# Patient Record
Sex: FEMALE | Race: WHITE | Employment: OTHER | URBAN - METROPOLITAN AREA
[De-identification: names, ages, dates, MRNs, and addresses within clinical notes are randomized per-mention and may not be internally consistent; named-entity substitution may affect disease eponyms.]

---

## 2017-01-26 ENCOUNTER — TELEPHONE (OUTPATIENT)
Dept: FAMILY MEDICINE CLINIC | Age: 47
End: 2017-01-26

## 2018-03-28 ENCOUNTER — OFFICE VISIT (OUTPATIENT)
Dept: BARIATRICS/WEIGHT MGMT | Age: 48
End: 2018-03-28

## 2018-03-28 VITALS
HEIGHT: 64 IN | SYSTOLIC BLOOD PRESSURE: 138 MMHG | HEART RATE: 101 BPM | BODY MASS INDEX: 50.02 KG/M2 | OXYGEN SATURATION: 97 % | DIASTOLIC BLOOD PRESSURE: 99 MMHG | WEIGHT: 293 LBS

## 2018-03-28 DIAGNOSIS — E66.01 MORBID OBESITY WITH BMI OF 50.0-59.9, ADULT (HCC): Primary | ICD-10-CM

## 2018-03-28 PROCEDURE — G8484 FLU IMMUNIZE NO ADMIN: HCPCS | Performed by: SURGERY

## 2018-03-28 PROCEDURE — 99215 OFFICE O/P EST HI 40 MIN: CPT | Performed by: SURGERY

## 2018-03-28 PROCEDURE — G8417 CALC BMI ABV UP PARAM F/U: HCPCS | Performed by: SURGERY

## 2018-03-28 PROCEDURE — G8427 DOCREV CUR MEDS BY ELIG CLIN: HCPCS | Performed by: SURGERY

## 2018-03-28 PROCEDURE — 1036F TOBACCO NON-USER: CPT | Performed by: SURGERY

## 2018-03-28 ASSESSMENT — ENCOUNTER SYMPTOMS
SORE THROAT: 0
CONSTIPATION: 0
BACK PAIN: 1
VOMITING: 0
WHEEZING: 0
ABDOMINAL DISTENTION: 1
VOICE CHANGE: 0
BLOOD IN STOOL: 0
NAUSEA: 0
TROUBLE SWALLOWING: 0
ABDOMINAL PAIN: 1
ANAL BLEEDING: 0
PHOTOPHOBIA: 0
COUGH: 0
COLOR CHANGE: 0
SHORTNESS OF BREATH: 1
DIARRHEA: 0

## 2018-04-12 ENCOUNTER — HOSPITAL ENCOUNTER (OUTPATIENT)
Dept: GENERAL RADIOLOGY | Age: 48
Discharge: HOME | End: 2018-04-12
Attending: PODIATRIST | Admitting: PODIATRIST

## 2018-04-12 DIAGNOSIS — M79.672 LEFT FOOT PAIN: ICD-10-CM

## 2018-04-24 ENCOUNTER — OFFICE VISIT (OUTPATIENT)
Dept: BARIATRICS/WEIGHT MGMT | Age: 48
End: 2018-04-24

## 2018-04-24 VITALS
DIASTOLIC BLOOD PRESSURE: 63 MMHG | WEIGHT: 293 LBS | HEIGHT: 64 IN | SYSTOLIC BLOOD PRESSURE: 135 MMHG | HEART RATE: 67 BPM | BODY MASS INDEX: 50.02 KG/M2

## 2018-04-24 VITALS
HEIGHT: 64 IN | BODY MASS INDEX: 50.02 KG/M2 | WEIGHT: 293 LBS | HEART RATE: 67 BPM | SYSTOLIC BLOOD PRESSURE: 135 MMHG | DIASTOLIC BLOOD PRESSURE: 63 MMHG

## 2018-04-24 DIAGNOSIS — K76.0 FATTY LIVER DISEASE, NONALCOHOLIC: ICD-10-CM

## 2018-04-24 DIAGNOSIS — E66.01 MORBID OBESITY WITH BMI OF 50.0-59.9, ADULT (HCC): Primary | ICD-10-CM

## 2018-04-24 DIAGNOSIS — Z01.818 PRE-OP EVALUATION: ICD-10-CM

## 2018-04-24 DIAGNOSIS — M54.42 BILATERAL LOW BACK PAIN WITH LEFT-SIDED SCIATICA, UNSPECIFIED CHRONICITY: ICD-10-CM

## 2018-04-24 PROCEDURE — 1036F TOBACCO NON-USER: CPT | Performed by: NURSE PRACTITIONER

## 2018-04-24 PROCEDURE — G8417 CALC BMI ABV UP PARAM F/U: HCPCS | Performed by: NURSE PRACTITIONER

## 2018-04-24 PROCEDURE — G8427 DOCREV CUR MEDS BY ELIG CLIN: HCPCS | Performed by: NURSE PRACTITIONER

## 2018-04-24 PROCEDURE — 99214 OFFICE O/P EST MOD 30 MIN: CPT | Performed by: NURSE PRACTITIONER

## 2018-04-24 PROCEDURE — 99999 PR OFFICE/OUTPT VISIT,PROCEDURE ONLY: CPT

## 2018-04-24 ASSESSMENT — ENCOUNTER SYMPTOMS
WHEEZING: 0
NAUSEA: 0
DIARRHEA: 0
ABDOMINAL PAIN: 0
ABDOMINAL DISTENTION: 0
RHINORRHEA: 0
CHEST TIGHTNESS: 0
TROUBLE SWALLOWING: 0
EYE PAIN: 0
SHORTNESS OF BREATH: 0

## 2018-07-10 ENCOUNTER — OFFICE VISIT (OUTPATIENT)
Dept: BARIATRICS/WEIGHT MGMT | Age: 48
End: 2018-07-10

## 2018-07-10 VITALS
BODY MASS INDEX: 50.02 KG/M2 | HEIGHT: 64 IN | RESPIRATION RATE: 16 BRPM | SYSTOLIC BLOOD PRESSURE: 124 MMHG | DIASTOLIC BLOOD PRESSURE: 70 MMHG | WEIGHT: 293 LBS | HEART RATE: 75 BPM

## 2018-07-10 DIAGNOSIS — Z01.818 PRE-OP EVALUATION: ICD-10-CM

## 2018-07-10 DIAGNOSIS — E66.01 MORBID OBESITY WITH BMI OF 50.0-59.9, ADULT (HCC): Primary | ICD-10-CM

## 2018-07-10 PROCEDURE — G8417 CALC BMI ABV UP PARAM F/U: HCPCS | Performed by: NURSE PRACTITIONER

## 2018-07-10 PROCEDURE — 1036F TOBACCO NON-USER: CPT | Performed by: NURSE PRACTITIONER

## 2018-07-10 PROCEDURE — G8427 DOCREV CUR MEDS BY ELIG CLIN: HCPCS | Performed by: NURSE PRACTITIONER

## 2018-07-10 PROCEDURE — 99213 OFFICE O/P EST LOW 20 MIN: CPT | Performed by: NURSE PRACTITIONER

## 2018-07-10 ASSESSMENT — ENCOUNTER SYMPTOMS
NAUSEA: 0
DIARRHEA: 0
BACK PAIN: 1
CHEST TIGHTNESS: 0
RHINORRHEA: 0
EYE PAIN: 0
ABDOMINAL PAIN: 0
ABDOMINAL DISTENTION: 0
WHEEZING: 0
TROUBLE SWALLOWING: 0
SHORTNESS OF BREATH: 0

## 2018-07-10 NOTE — PROGRESS NOTES
Patient dines out to a sit down restaurant 1 times per week. Patient eats fast food meals 1 times per week.       Drinks mostly water and diet pepsi    24 hour recall/food frequency chart:  Breakfast: none    Snack: none  Lunch: 4 chicken nugget and diet coke  Snack: none  Dinner: banana peppers  Snack: none    Total daily calories: not regularly      Exercises : goes to gym 60-90 min 4-5 times per week and walking a lot

## 2018-07-10 NOTE — PROGRESS NOTES
at the upper limit of normal in size. LV hypertrophy with normal LV systolic, but abnormal diastolic function. Normal valvular structures and function.  History of blood transfusion 09/2015    No Reaction To Blood Transfusions Received    [place] (hard of hearing)     Right Ear    HTN (hypertension)     \"on medication since 2013\"    Hx of cardiac catheterization 10/24/2010    Angiographically normal coronary arteries w/ normal LV function and wall motion.  Hx of transesophageal echocardiography (PILO) for monitoring 12/2/2010    EF 55-60%. WNL.     Lupus Dx 2013    Nausea & vomiting 4/19/2013    Pancreatitis chronic Dx 2001    Pneumonia Dx 11-15    Shortness of breath on exertion     Staph infection Dx 11-15    Left Foot    Super obesity (HCC)     Teeth missing     Upper And Lower    Thyroid disease     UTI (urinary tract infection) In Past    No Current Symptoms    Wears glasses       Patient Active Problem List   Diagnosis    Abdominal pain    Rectal bleeding    Fever    Upper GI bleed    Fibromyalgia    Lupus (systemic lupus erythematosus) (HCC)    Nausea & vomiting    Chest pain    Chronic pancreatitis (HCC)    HTN (hypertension)    Acute pancreatitis    Right-sided chest pain    Super obesity (HCC)    Normocytic anemia    Hypokalemia    Generalized abdominal pain    Pneumonia of both lungs due to infectious organism    Foot ulcer, left ([address]    SLE (systemic lupus erythematosus related syndrome) (HCC)    Hypoxia    Cellulitis    Pancytopenia (HCC)    Pleurisy    Frequent UTI    Gastroesophageal reflux disease without esophagitis    MRSA cellulitis of left foot    Depression    Fatty liver    Fatty liver disease, nonalcoholic    Arthritis    Bilateral low back pain with left-sided sciatica    Morbid obesity with BMI of 50.0-59.9, adult [facility])     Past Surgical History:   Procedure Laterality Date    APPENDECTOMY  02/1998    Done When Tubes And Ovaries Were Removed mg by mouth nightly       tizanidine (ZANAFLEX) 4 MG tablet   Take 4 mg by mouth 2 times daily        No current facility-administered medications for this visit. Allergies   Allergen Reactions    Aspirin Palpitations     \"My Heart Rate Elevates\"    Compazine [Prochlorperazine Maleate] Rash    Reglan [Metoclopramide Hcl] Rash    Shellfish-Derived Products Swelling    Toradol [Ketorolac Tromethamine] Rash           Review of Systems   Constitutional: Negative. Negative for appetite change, fatigue and fever. HENT: Negative for congestion, dental problem, hearing loss, rhinorrhea and trouble swallowing. Eyes: Negative for pain. Respiratory: Negative for chest tightness, shortness of breath and wheezing. Cardiovascular: Negative for chest pain, palpitations and leg swelling. Gastrointestinal: Negative for abdominal distention, abdominal pain, diarrhea and nausea. GERD   Endocrine: Negative for cold intolerance and polydipsia. Genitourinary: Negative for difficulty urinating and frequency. Musculoskeletal: Positive for arthralgias, back pain and myalgias. Negative for gait problem. Skin: Negative for rash. Allergic/Immunologic: Negative for environmental allergies. Neurological: Negative for dizziness, seizures and syncope. Hematological: Does not bruise/bleed easily. Psychiatric/Behavioral: Negative for behavioral problems and suicidal ideas. OBJECTIVE:    /70 (Site: Right Arm, Position: Sitting, Cuff Size: Large Adult)   Pulse 75   Resp 16   Ht 5' 4.25\" (1.632 m)   Wt (!) 306 lb 14.4 oz (139.2 kg)   BMI 52.27 kg/m²      Physical Exam   Constitutional: She is oriented to person, place, and time. She appears well-developed and well-nourished. Obese   HENT:   Head: Normocephalic and atraumatic.    Right Ear: Hearing and ear canal normal.   Left Ear: Hearing and ear canal normal.   Nose: Nose normal.   Mouth/Throat: Uvula is midline and oropharynx is clear

## 2018-07-20 ENCOUNTER — OFFICE VISIT (OUTPATIENT)
Dept: CARDIOLOGY CLINIC | Age: 48
End: 2018-07-20

## 2018-07-20 ENCOUNTER — HOSPITAL ENCOUNTER (OUTPATIENT)
Dept: GENERAL RADIOLOGY | Age: 48
Discharge: HOME | End: 2018-07-20
Attending: PODIATRIST | Admitting: PODIATRIST

## 2018-07-20 VITALS
BODY MASS INDEX: 51.91 KG/M2 | SYSTOLIC BLOOD PRESSURE: 130 MMHG | WEIGHT: 293 LBS | HEART RATE: 80 BPM | DIASTOLIC BLOOD PRESSURE: 80 MMHG

## 2018-07-20 DIAGNOSIS — Z01.810 PREOP CARDIOVASCULAR EXAM: Primary | ICD-10-CM

## 2018-07-20 DIAGNOSIS — M65.872 OTHER SYNOVITIS AND TENOSYNOVITIS, LEFT ANKLE AND FOOT: ICD-10-CM

## 2018-07-20 DIAGNOSIS — M79.672 LEFT FOOT PAIN: ICD-10-CM

## 2018-07-20 DIAGNOSIS — S92.302A CLOSED DISPLACED FRACTURE OF METATARSAL BONE OF LEFT FOOT, UNSPECIFIED METATARSAL, INITIAL ENCOUNTER: ICD-10-CM

## 2018-07-20 PROCEDURE — 99214 OFFICE O/P EST MOD 30 MIN: CPT | Performed by: INTERNAL MEDICINE

## 2018-07-20 PROCEDURE — G8417 CALC BMI ABV UP PARAM F/U: HCPCS | Performed by: INTERNAL MEDICINE

## 2018-07-20 PROCEDURE — G8427 DOCREV CUR MEDS BY ELIG CLIN: HCPCS | Performed by: INTERNAL MEDICINE

## 2018-07-20 PROCEDURE — 1036F TOBACCO NON-USER: CPT | Performed by: INTERNAL MEDICINE

## 2018-07-20 NOTE — PROGRESS NOTES
(Patient taking differently: Take 100 mcg by mouth nightly ) 30 tablet 2    gabapentin (NEURONTIN) 800 MG tablet Take 800 mg by mouth 3 times daily      atenolol (TENORMIN) 100 MG tablet Take 100 mg by mouth 2 times daily       PARoxetine (PAXIL) 30 MG tablet Take 30 mg by mouth nightly       tizanidine (ZANAFLEX) 4 MG tablet   Take 4 mg by mouth 2 times daily        No current facility-administered medications for this visit. Allergies: Aspirin; Compazine [prochlorperazine maleate]; Reglan [metoclopramide hcl]; Shellfish-derived products; and Toradol [ketorolac tromethamine]  Past Medical History:   Diagnosis Date    Acid reflux     Anemia     Anxiety     Arthritis     Hands, Back And Ankles    Bleeding ulcer 2014    \"I Had Ulcers In My Stomach And Colon\"    Bronchitis Last Episode 2014    Chronic back pain     Chronic pain     Sees Dr. Dali Swenson At [address] Depression     Fibromyalgia Dx 2013    H/O echocardiogram 8/11/15    EF >55%. LA to be at the upper limit of normal in size. LV hypertrophy with normal LV systolic, but abnormal diastolic function. Normal valvular structures and function.  History of blood transfusion 09/2015    No Reaction To Blood Transfusions Received    [place] (hard of hearing)     Right Ear    HTN (hypertension)     \"on medication since 2013\"    Hx of cardiac catheterization 10/24/2010    Angiographically normal coronary arteries w/ normal LV function and wall motion.  Hx of transesophageal echocardiography (PILO) for monitoring 12/2/2010    EF 55-60%. WNL.     Lupus Dx 2013    Nausea & vomiting 4/19/2013    Pancreatitis chronic Dx 2001    Pneumonia Dx 11-15    Shortness of breath on exertion     Staph infection Dx 11-15    Left Foot    Super obesity (HCC)     Teeth missing     Upper And Lower    Thyroid disease     UTI (urinary tract infection) In Past    No Current Symptoms    Wears glasses      Past Surgical History:   Procedure Laterality Date complaints  · Integumentary: No rash or pruritis  · Neurological: No TIA or stroke symptoms  · Psychiatric: No anxiety or depression  · Endocrine: No malaise, fatigue or temperature intolerance  · Hematologic/Lymphatic: No bleeding problems, blood clots or swollen lymph nodes  · Allergic/Immunologic: No nasal congestion or hives  All systems negative except as marked. Objective:  /80   Pulse 80   Wt (!) 304 lb 12.8 oz (138.3 kg)   Breastfeeding? No   BMI 51.91 kg/m²   Wt Readings from Last 3 Encounters:   07/20/18 (!) 304 lb 12.8 oz (138.3 kg)   07/10/18 (!) 306 lb 14.4 oz (139.2 kg)   04/24/18 (!) 316 lb 9.6 oz (143.6 kg)     Body mass index is 51.91 kg/m². GENERAL - Alert, oriented, pleasant, in no apparent distress,normal grooming  HEENT  pupils are reactive to light and accomodation, cornea intact, conjunctive normal color, ears are normal in exam,throat exam in normal, teeth, gum and palate are normal, oral mucosa is normal without any notation of pallor or cyanosis  Neck - Supple. No jugular venous distention noted. No carotid bruits, no apical -carotid delay  Respiratory:  Normal breath sounds, No respiratory distress, No wheezing, No chest tenderness. ,no use of accessory muscles, diaphragm movement is normal  Cardiovascular: (PMI) apex non displaced,no lifts no thrills, no s3,no s4, Normal heart rate, Normal rhythm, No murmurs, No rubs, No gallops.  Carotid arteries pulse and amplitude are normal no bruit, no abdominal bruit noted ( normal abdominal aorta ausculation), femoral arteries pulse and amplitude are normal no bruit, pedal pulses are normal  Femoral pulses have normal amplitude, no bruits   Extremities - No cyanosis, clubbing, or significant edema, no varicose veins    Abdomen  No masses, tenderness, or organomegaly, no hepato-splenomegally, no bruits  Musculoskeletal  No significant edema, no kyphosis or scoliosis, no deformity in any extremity noted, muscle strength and tone are normal  Skin: no ulcer,no scar,no stasis dermatitis   Neurologic  alert oriented times 3,Cranial nerves II through XII are grossly intact. There were no gross focal neurologic abnormalities. All sensory and motor nerves examined and were normal  Psychiatric: normal mood and affect    Lab Results   Component Value Date    CKTOTAL 27 01/04/2016    TROPONINI <0.006 04/25/2013    TROPONINI <0.006 11/16/2011     BNP:    Lab Results   Component Value Date    BNP 88 11/26/2010     PT/INR:  No results found for: PTINR  Lab Results   Component Value Date    LABA1C 5.4 02/06/2016    LABA1C 5.2 10/03/2015     Lab Results   Component Value Date    TRIG 161 (H) 07/23/2015     Lab Results   Component Value Date    ALT 49 (H) 03/29/2018    AST 50 (H) 03/29/2018     TSH:  No results found for: TSH    Impression:  Karla Horn is a 50 y. o.year old who  has a past medical history of Acid reflux; Anemia; Anxiety; Arthritis; Bleeding ulcer; Bronchitis; Chronic back pain; Chronic pain; Depression; Fibromyalgia; H/O echocardiogram; History of blood transfusion; [place] (hard of hearing); HTN (hypertension); Hx of cardiac catheterization; Hx of transesophageal echocardiography (PILO) for monitoring; Lupus; Nausea & vomiting; Pancreatitis chronic; Pneumonia; Shortness of breath on exertion; Staph infection; Super obesity ([address] Teeth missing; Thyroid disease; UTI (urinary tract infection); and Wears glasses. and presents with     Plan:  1. PREOP: echo ordered, her RVSP in 2015 was 41mmHg  2. HTN: stable, continue atenolol and catapres  3. Fibromyalgia: stable, continue sevela  4. Lupus: stable  5. Depression: stable  All labs, medications and tests reviewed, continue all other medications of all above medical condition listed as is.     @SYD@

## 2018-07-21 PROBLEM — R11.2 INTRACTABLE VOMITING WITH NAUSEA: Status: ACTIVE | Noted: 2018-07-21

## 2018-07-21 PROBLEM — E86.0 DEHYDRATION: Status: ACTIVE | Noted: 2018-07-21

## 2018-08-15 ENCOUNTER — OFFICE VISIT (OUTPATIENT)
Dept: BARIATRICS/WEIGHT MGMT | Age: 48
End: 2018-08-15

## 2018-08-15 VITALS
RESPIRATION RATE: 15 BRPM | WEIGHT: 293 LBS | HEART RATE: 88 BPM | HEIGHT: 64 IN | SYSTOLIC BLOOD PRESSURE: 148 MMHG | BODY MASS INDEX: 50.02 KG/M2 | DIASTOLIC BLOOD PRESSURE: 78 MMHG

## 2018-08-15 DIAGNOSIS — K92.2 UPPER GI BLEED: Primary | ICD-10-CM

## 2018-08-15 PROCEDURE — G8427 DOCREV CUR MEDS BY ELIG CLIN: HCPCS | Performed by: SURGERY

## 2018-08-15 PROCEDURE — 1036F TOBACCO NON-USER: CPT | Performed by: SURGERY

## 2018-08-15 PROCEDURE — 1111F DSCHRG MED/CURRENT MED MERGE: CPT | Performed by: SURGERY

## 2018-08-15 PROCEDURE — 99214 OFFICE O/P EST MOD 30 MIN: CPT | Performed by: SURGERY

## 2018-08-15 PROCEDURE — G8417 CALC BMI ABV UP PARAM F/U: HCPCS | Performed by: SURGERY

## 2018-08-15 ASSESSMENT — ENCOUNTER SYMPTOMS
VOICE CHANGE: 0
WHEEZING: 0
NAUSEA: 0
SHORTNESS OF BREATH: 0
SORE THROAT: 0
PHOTOPHOBIA: 0
COLOR CHANGE: 0
ANAL BLEEDING: 0
DIARRHEA: 0
COUGH: 0
BLOOD IN STOOL: 0
CONSTIPATION: 0
VOMITING: 0
ABDOMINAL PAIN: 0
TROUBLE SWALLOWING: 0

## 2018-08-15 NOTE — PROGRESS NOTES
BARIATRIC SURGERY OFFICE NOTE    SUBJECTIVE:    Patient presenting today referred from No primary care provider on file. , for   Chief Complaint   Patient presents with    Weight Management     WM visit discuss EGD       HPI: Dania Pavon is a 50 y.o. female being seen for follow up for bariatric weight loss surgery. Andre's last month weight gain/loss was - 3.5 Lbs. Addressed the status of the following co-morbidities:   1. Vomiting  improving. 2. Melena  Done. 3. Anemia improving. Patient is here for their fourth bariatric visit, follow up diet and exercise - pre-operative weight loss, in consideration for bariatric surgery. BMI: Body mass index is 51.67 kg/m². Obesity Classification: Class III Morbid Obesity. Weight History: Wt Readings from Last 3 Encounters:   08/15/18 (!) 303 lb 6.4 oz (137.6 kg)   08/06/18 (!) 342 lb 3.2 oz (155.2 kg)   07/20/18 (!) 304 lb 12.8 oz (138.3 kg)     Total weight loss/gain -3.5Lbs over 1 month. Patient dines out to a sit down restaurant 1-2 times per month. Patient eats fast food meals 2 times per month. Drinks mostly water, diet Pepsi    24 hour recall/food frequency chart:  Breakfast: 1 cup of oatmeal, 1-  8 oz glass of almond milk, a banana   Snack: no  Lunch: [employer]ystem protein shake  Snack: no  Dinner:  salad with dressing sprayed on it, baked chicken breast  Snack: no    Total daily calories: sometimes - [id number]      Exercises not exercising at this time other that a little walking since EGD    Needs her Mediport exchanged. Thoroughly reviewed the patient's medical history, family history, social history and review of systems with the patient today in the office. Please see medical record for pertinent positives.       Past Medical History:   Diagnosis Date    Acid reflux     Anemia     Anxiety     Arthritis     Hands, Back And Ankles    Bleeding ulcer 2014    \"I Had Ulcers In My Stomach And Colon\"    Bronchitis Last TONSILLECTOMY AND ADENOIDECTOMY  1975     Current Outpatient Prescriptions   Medication Sig Dispense Refill    tiZANidine (ZANAFLEX) 4 MG tablet Take 1 tablet by mouth 2 times daily 60 tablet 0    ferrous sulfate 325 (65 Fe) MG tablet Take 1 tablet by mouth 2 times daily (with meals) 60 tablet 0    levothyroxine (SYNTHROID) 125 MCG tablet Take 1 tablet by mouth Daily 30 tablet 0    sucralfate (CARAFATE) 1 GM/10ML suspension Take 10 mLs by mouth 4 times daily (before meals and nightly) 1200 mL 0    vitamin C (ASCORBIC ACID) 500 MG tablet Take 500 mg by mouth daily      ondansetron (ZOFRAN ODT) 4 MG disintegrating tablet Take 1 tablet by mouth every 6 hours 10 tablet 0    dicyclomine (BENTYL) 10 MG capsule Take 1 capsule by mouth 4 times daily as needed (abdominal cramping) 20 capsule 0    estradiol (ESTRACE) 1 MG tablet Take 1 mg by mouth nightly      milnacipran HCl (SAVELLA) 50 MG TABS Take 50 mg by mouth 2 times daily      Cholecalciferol (VITAMIN D3) 5000 UNITS TABS Take by mouth every morning Over The Counter      OMEPRAZOLE PO Take 20 mg by mouth 2 times daily      cloNIDine (CATAPRES) 0.1 MG tablet Take 0.1 mg by mouth 2 times daily      gabapentin (NEURONTIN) 800 MG tablet Take 800 mg by mouth 3 times daily      atenolol (TENORMIN) 100 MG tablet Take 100 mg by mouth 2 times daily       PARoxetine (PAXIL) 30 MG tablet Take 30 mg by mouth nightly        No current facility-administered medications for this visit. Allergies   Allergen Reactions    Aspirin Palpitations     \"My Heart Rate Elevates\"    Compazine [Prochlorperazine Maleate] Rash    Reglan [Metoclopramide Hcl] Rash    Shellfish-Derived Products Swelling    Toradol [Ketorolac Tromethamine] Rash           Review of Systems   Constitutional: Negative for activity change, chills, diaphoresis and fever. HENT: Negative for sore throat, trouble swallowing and voice change. Eyes: Negative for photophobia and visual disturbance.

## 2018-08-17 ENCOUNTER — TELEPHONE (OUTPATIENT)
Dept: BARIATRICS/WEIGHT MGMT | Age: 48
End: 2018-08-17

## 2018-08-20 PROBLEM — E86.0 DEHYDRATION: Status: RESOLVED | Noted: 2018-07-21 | Resolved: 2018-08-20

## 2018-08-30 ENCOUNTER — PROCEDURE VISIT (OUTPATIENT)
Dept: CARDIOLOGY CLINIC | Age: 48
End: 2018-08-30

## 2018-08-30 DIAGNOSIS — Z01.810 PREOP CARDIOVASCULAR EXAM: ICD-10-CM

## 2018-08-30 LAB
LV EF: 58 %
LVEF MODALITY: NORMAL

## 2018-09-04 NOTE — ANESTHESIA PRE-OP
Abdominal Hysterectomy    LUNG REMOVAL, PARTIAL Left 2008    Benign    OTHER SURGICAL HISTORY  02/1998    \"Tubes And Ovaries Removed, Appendectomy Also Done\"    OTHER SURGICAL HISTORY  Last Done 2014    Mediport Insertion \"Total Of Four Done, Removed Last Mediport In 2014\"   [address] ENDOSCOPY  08/27/2018        Current Medications:   Not in a hospital admission. Allergies: Allergies   Allergen Reactions    Aspirin Palpitations     \"My Heart Rate Elevates\"    Compazine [Prochlorperazine Maleate] Rash    Reglan [Metoclopramide Hcl] Rash    Shellfish-Derived Products Swelling    Toradol [Ketorolac Tromethamine] Rash             Social History:  Social History     Social History    Marital status:      Spouse name: N/A    Number of children: N/A    Years of education: N/A     Occupational History    Not on file. Social History Main Topics    Smoking status: Never Smoker    Smokeless tobacco: Never Used    Alcohol use No    Drug use: No    Sexual activity: No      Comment:      Other Topics Concern    Not on file     Social History Narrative    No narrative on file        Recent Vitals: There were no vitals filed for this visit. Wt Readings from Last 3 Encounters:   09/11/18 (!) 302 lb 4 oz (137.1 kg)   08/29/18 296 lb (134.3 kg)   08/15/18 (!) 303 lb 6.4 oz (137.6 kg)      Ht Readings from Last 3 Encounters:   08/29/18 5' 4\" (1.626 m)   08/15/18 5' 4.25\" (1.632 m)   07/27/18 5' 4\" (1.626 m)     There is no height or weight on file to calculate BMI.     Wt Readings from Last 3 Encounters:   08/29/18 296 lb (134.3 kg)   08/15/18 (!) 303 lb 6.4 oz (137.6 kg)   08/06/18 (!) 342 lb 3.2 oz (155.2 kg)       Present on Admission:  **None**     Anesthesia Alert:  No      Malignant Hyperthermia:  no     History of Sleep Apnea:   Recently completed sleep study, no results per Dr. Rexene Gowers.     REVIEW OF SYSTEMS:      EYES: full  Thyromental Distance: > 3 FB  History of difficult intubation:  None  Mallampati Classification:  Class II  Teeth: recent extraction upper right. Denies chipped or loose teeth    Testing Results:    EK2018  Normal sinus rhythm   Minimal voltage criteria for LVH, may be normal variant   Borderline ECG   When compared with ECG of 30-MAR-2018 01:35,   No significant change was found     LABS:      CBC  Lab Results   Component Value Date/Time    WBC 5.2 2018 09:10 AM    HGB 12.5 2018 09:10 AM    HCT 40.3 2018 09:10 AM     2018 09:10 AM     RENAL  Lab Results   Component Value Date/Time     2018 09:10 AM    K 4.4 2018 09:10 AM     2018 09:10 AM    CO2 23 2018 09:10 AM    BUN 17 2018 09:10 AM    CREATININE 0.7 2018 09:10 AM    GLUCOSE 120 (H) 2018 09:10 AM       Summary:  Chart reviewed, pt. Interviewed and examined. Planned surgical procedure:     Port removal with replacement per Dr. Sb Redd.    1.   HTN on beta blocker. Echo 2018, EF 55-60%. Denies CP or SOB. Limited activity r/t foot neuropathy. 2.  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 Non smoker. Able to lie flat. Recent completed sleep study per Dr. Ingrid Torres. 3.  Controlled reflux, on PPI. Hiatal hernia, fatty liver dz s/p norris, . 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 s/p multiple ERCP's and CT of abdm, bile duct stone removed, . Last episode2018. 4.  Hx anemia. EGD 18 per Dr. Rosario Bryan showed erosive gastropathy with gastric bleeding. Transfusion, 2018. Leukopenia. Evaluated by Dr. Carmela Howard. 5.  Hx obesity, s/p lap band ~2000 lost 100 lbs. Removed after 6 months. Following Dr. Sb Redd for gastric sleeve surgery. 4.  Benign left renal mass. Hx urinary tract infection, .    5.  Recurrent Left foot infection.  Poor

## 2018-09-05 ENCOUNTER — HOSPITAL ENCOUNTER (OUTPATIENT)
Dept: PREADMISSION TESTING | Age: 48
Discharge: HOME | End: 2018-09-05
Attending: SURGERY | Admitting: SURGERY

## 2018-09-10 ENCOUNTER — TELEPHONE (OUTPATIENT)
Dept: CARDIOLOGY CLINIC | Age: 48
End: 2018-09-10

## 2018-09-10 NOTE — TELEPHONE ENCOUNTER
Spoke with pt and gave echo results      Summary   Normal left ventricle structure and systolic function with an ejection   fraction of 55-60%.    Grade I diastolic dysfunction.   Mildly dilated left atrium.   No significant valvular disease noted.   No evidence of pericardial effusion.   Essentially unremarkable echo.

## 2018-09-11 ENCOUNTER — HOSPITAL ENCOUNTER (OUTPATIENT)
Dept: GENERAL RADIOLOGY | Age: 48
Discharge: HOME | End: 2018-09-11
Attending: INTERNAL MEDICINE | Admitting: INTERNAL MEDICINE

## 2018-09-11 ENCOUNTER — HOSPITAL ENCOUNTER (OUTPATIENT)
Dept: GENERAL RADIOLOGY | Age: 48
Discharge: HOME | End: 2018-09-11
Attending: PAIN MEDICINE | Admitting: PAIN MEDICINE

## 2018-09-11 ENCOUNTER — HOSPITAL ENCOUNTER (OUTPATIENT)
Dept: PREADMISSION TESTING | Age: 48
Discharge: HOME | End: 2018-09-11
Attending: SURGERY | Admitting: SURGERY

## 2018-09-11 VITALS
RESPIRATION RATE: 16 BRPM | WEIGHT: 293 LBS | HEART RATE: 72 BPM | SYSTOLIC BLOOD PRESSURE: 187 MMHG | OXYGEN SATURATION: 96 % | TEMPERATURE: 97.1 F | DIASTOLIC BLOOD PRESSURE: 79 MMHG | HEIGHT: 65 IN | BODY MASS INDEX: 48.82 KG/M2

## 2018-09-11 DIAGNOSIS — M54.42 CHRONIC LOW BACK PAIN WITH LEFT-SIDED SCIATICA, UNSPECIFIED BACK PAIN LATERALITY: ICD-10-CM

## 2018-09-11 DIAGNOSIS — R06.02 BREATH SHORTNESS: ICD-10-CM

## 2018-09-11 DIAGNOSIS — G89.29 CHRONIC LOW BACK PAIN WITH LEFT-SIDED SCIATICA, UNSPECIFIED BACK PAIN LATERALITY: ICD-10-CM

## 2018-09-11 LAB
ANION GAP SERPL CALCULATED.3IONS-SCNC: 12 MMOL/L (ref 4–16)
BUN BLDV-MCNC: 17 MG/DL (ref 6–23)
CALCIUM SERPL-MCNC: 12.3 MG/DL (ref 8.3–10.6)
CHLORIDE BLD-SCNC: 104 MMOL/L (ref 99–110)
CO2: 23 MMOL/L (ref 21–32)
CREAT SERPL-MCNC: 0.7 MG/DL (ref 0.6–1.1)
GFR AFRICAN AMERICAN: >60 ML/MIN/1.73M2
GFR NON-AFRICAN AMERICAN: >60 ML/MIN/1.73M2
GLUCOSE BLD-MCNC: 120 MG/DL (ref 70–99)
HCT VFR BLD CALC: 40.3 % (ref 37–47)
HEMOGLOBIN: 12.5 GM/DL (ref 12.5–16)
MCH RBC QN AUTO: 27.2 PG (ref 27–31)
MCHC RBC AUTO-ENTMCNC: 31 % (ref 32–36)
MCV RBC AUTO: 87.8 FL (ref 78–100)
PDW BLD-RTO: 13.3 % (ref 11.7–14.9)
PLATELET # BLD: 310 K/CU MM (ref 140–440)
PMV BLD AUTO: 10 FL (ref 7.5–11.1)
POTASSIUM SERPL-SCNC: 4.4 MMOL/L (ref 3.5–5.1)
RBC # BLD: 4.59 M/CU MM (ref 4.2–5.4)
SODIUM BLD-SCNC: 139 MMOL/L (ref 135–145)
WBC # BLD: 5.2 K/CU MM (ref 4–10.5)

## 2018-09-11 RX ORDER — HYDROCODONE BITARTRATE AND ACETAMINOPHEN 5; 325 MG/1; MG/1
1 TABLET ORAL PRN
Status: ON HOLD | COMMUNITY
End: 2018-09-13 | Stop reason: HOSPADM

## 2018-09-11 ASSESSMENT — PAIN SCALES - GENERAL: PAINLEVEL_OUTOF10: 0

## 2018-09-19 ENCOUNTER — OFFICE VISIT (OUTPATIENT)
Dept: BARIATRICS/WEIGHT MGMT | Age: 48
End: 2018-09-19

## 2018-09-19 VITALS
WEIGHT: 293 LBS | HEART RATE: 75 BPM | SYSTOLIC BLOOD PRESSURE: 149 MMHG | HEIGHT: 64 IN | DIASTOLIC BLOOD PRESSURE: 67 MMHG | RESPIRATION RATE: 14 BRPM | BODY MASS INDEX: 50.02 KG/M2

## 2018-09-19 DIAGNOSIS — E66.01 MORBID OBESITY WITH BMI OF 50.0-59.9, ADULT (HCC): ICD-10-CM

## 2018-09-19 DIAGNOSIS — K44.9 HIATAL HERNIA: Primary | ICD-10-CM

## 2018-09-19 PROCEDURE — G8427 DOCREV CUR MEDS BY ELIG CLIN: HCPCS | Performed by: SURGERY

## 2018-09-19 PROCEDURE — 99214 OFFICE O/P EST MOD 30 MIN: CPT | Performed by: SURGERY

## 2018-09-19 PROCEDURE — 1036F TOBACCO NON-USER: CPT | Performed by: SURGERY

## 2018-09-19 PROCEDURE — G8417 CALC BMI ABV UP PARAM F/U: HCPCS | Performed by: SURGERY

## 2018-09-19 RX ORDER — HYDROCODONE BITARTRATE AND ACETAMINOPHEN 7.5; 325 MG/1; MG/1
1 TABLET ORAL 3 TIMES DAILY
Status: ON HOLD | COMMUNITY
End: 2019-02-13 | Stop reason: HOSPADM

## 2018-09-19 ASSESSMENT — ENCOUNTER SYMPTOMS
COLOR CHANGE: 0
VOICE CHANGE: 0
DIARRHEA: 0
SORE THROAT: 0
BACK PAIN: 1
ABDOMINAL PAIN: 1
VOMITING: 0
CONSTIPATION: 0
SHORTNESS OF BREATH: 1
ABDOMINAL DISTENTION: 1
NAUSEA: 0
COUGH: 0
WHEEZING: 0
ANAL BLEEDING: 0
TROUBLE SWALLOWING: 0
PHOTOPHOBIA: 0
BLOOD IN STOOL: 0

## 2018-09-19 NOTE — PROGRESS NOTES
500 MG tablet Take 500 mg by mouth every morning Over The Counter      dicyclomine (BENTYL) 10 MG capsule Take 1 capsule by mouth 4 times daily as needed (abdominal cramping) 20 capsule 0    estradiol (ESTRACE) 1 MG tablet Take 1 mg by mouth nightly      milnacipran HCl (SAVELLA) 50 MG TABS Take 50 mg by mouth 2 times daily      Cholecalciferol (VITAMIN D3) 5000 UNITS TABS Take by mouth every morning Over The Counter      OMEPRAZOLE PO Take 20 mg by mouth 2 times daily      cloNIDine (CATAPRES) 0.1 MG tablet Take 0.1 mg by mouth 2 times daily      gabapentin (NEURONTIN) 800 MG tablet Take 800 mg by mouth 3 times daily      atenolol (TENORMIN) 100 MG tablet Take 100 mg by mouth nightly       PARoxetine (PAXIL) 30 MG tablet Take 30 mg by mouth nightly        No current facility-administered medications for this visit. Allergies   Allergen Reactions    Aspirin Palpitations     \"My Heart Rate Elevates\"    Compazine [Prochlorperazine Maleate] Rash    Reglan [Metoclopramide Hcl] Rash    Shellfish-Derived Products Swelling    Toradol [Ketorolac Tromethamine] Rash           Review of Systems   Constitutional: Positive for fatigue. Negative for activity change, chills, diaphoresis and fever. HENT: Negative for sore throat, trouble swallowing and voice change. Eyes: Negative for photophobia and visual disturbance. Respiratory: Positive for shortness of breath. Negative for cough and wheezing. Cardiovascular: Positive for leg swelling. Negative for chest pain and palpitations. Gastrointestinal: Positive for abdominal distention and abdominal pain. Negative for anal bleeding, blood in stool, constipation, diarrhea, nausea and vomiting. Endocrine: Positive for polyphagia. Negative for cold intolerance, heat intolerance, polydipsia and polyuria. Genitourinary: Positive for urgency. Negative for dysuria, frequency and hematuria.    Musculoskeletal: Positive for arthralgias, back pain and gait problem. Negative for joint swelling, myalgias and neck stiffness. Skin: Negative for color change and rash. Neurological: Negative for seizures, speech difficulty, light-headedness and numbness. Hematological: Negative for adenopathy. Does not bruise/bleed easily. Psychiatric/Behavioral: Positive for sleep disturbance. The patient is nervous/anxious. OBJECTIVE:    Vitals:    09/19/18 1429   BP: (!) 149/67   Pulse: 75   Resp: 14        Physical Exam   Constitutional: She is oriented to person, place, and time. She appears well-developed and well-nourished. No distress. HENT:   Head: Normocephalic and atraumatic. Eyes: Pupils are equal, round, and reactive to light. Conjunctivae and EOM are normal. No scleral icterus. Neck: Normal range of motion. No JVD present. No tracheal deviation present. No thyromegaly present. Cardiovascular: Normal rate, regular rhythm, normal heart sounds and intact distal pulses. Exam reveals no gallop and no friction rub. No murmur heard. Pulmonary/Chest: Effort normal. No stridor. No respiratory distress. She has no wheezes. She has no rales. She exhibits no tenderness. Abdominal: Soft. Bowel sounds are normal. She exhibits no distension and no mass. There is no tenderness. There is no rebound and no guarding. Musculoskeletal: Normal range of motion. She exhibits no edema or tenderness. Lymphadenopathy:     She has no cervical adenopathy. Neurological: She is alert and oriented to person, place, and time. No cranial nerve deficit. Coordination normal.   Skin: Skin is warm and dry. No rash noted. She is not diaphoretic. No erythema. No pallor. Psychiatric: Her behavior is normal. Judgment and thought content normal.   Vitals reviewed. 8/29/2018 10:10 AM - Parag, Lab Incoming [place]     Narrative     (NOTE)  Specimen #[id number]  Final Pathologic Diagnosis:  Stomach, antrum, biopsy:  -  Mild chronic gastritis (see comment, see stains report).   -  Helicobacter pylori microorganisms are not identified. ASSESSMENT & PLAN:    1. Hiatal hernia    2. Morbid obesity with BMI of 50.0-59.9, adult ([address]         Needs one last month, then will consent for the sleeve. Patient was encouraged to journal all food intake. Keep calorie level at approximately 2007-7300. Protein intake is to be a minimum of 60-80 grams per day. Water drinking was encouraged with a goal of 64oz-128oz daily. Beverages to be calorie free except for milk. Every other beverage should be water. They are to avoid soda. Continue to increase level of physical activity. More than 50% of the office visit today (25 min) was spent in face to face counseling regarding diet and exercise, in preparation for her planned Robotic Sleeve Gastrectomy. Counting calories, complying with the dietitian's recommendations, and complying with the preoperative workup including the dietitian counseling, exercise physiologist counseling, cardiologist evaluation and pre-operative optimization, pulmonologist evaluation and pre operative optimization, pre operative EGD evaluation. The patient expressed understanding and willingness to comply nicely; all questions and concerns addressed in details. Patient counseled on the risks, benefits, and alternatives of treatment plan at length while in the office today. Patient states an understanding and willingness to proceed with the plan. Follow Up:  Return in about 4 weeks (around 10/17/2018) for Bariatric follow up: diet, exercise & weight loss, Follow up Symptoms.       Joey Mcclure MD, FACS, FICS  Member of the [id number] of [employer] and [employer]    [phone]    9/19/18

## 2018-10-16 ENCOUNTER — HOSPITAL ENCOUNTER (OUTPATIENT)
Age: 48
Setting detail: SPECIMEN
Discharge: HOME | End: 2018-10-16
Payer: COMMERCIAL

## 2018-10-16 LAB
FERRITIN: 52 NG/ML (ref 15–150)
IRON: 39 UG/DL (ref 37–145)
PCT TRANSFERRIN: 10 % (ref 10–44)
TOTAL IRON BINDING CAPACITY: 376 UG/DL (ref 250–450)
UNSATURATED IRON BINDING CAPACITY: 337 UG/DL (ref 110–370)

## 2018-10-16 PROCEDURE — 83540 ASSAY OF IRON: CPT

## 2018-10-16 PROCEDURE — 83550 IRON BINDING TEST: CPT

## 2018-10-16 PROCEDURE — 82728 ASSAY OF FERRITIN: CPT

## 2018-10-24 ENCOUNTER — HOSPITAL ENCOUNTER (EMERGENCY)
Age: 48
Discharge: HOME | End: 2018-10-24
Payer: COMMERCIAL

## 2018-10-24 VITALS
HEART RATE: 67 BPM | SYSTOLIC BLOOD PRESSURE: 103 MMHG | HEIGHT: 64 IN | WEIGHT: 293 LBS | DIASTOLIC BLOOD PRESSURE: 64 MMHG | BODY MASS INDEX: 50.02 KG/M2 | OXYGEN SATURATION: 97 % | RESPIRATION RATE: 10 BRPM | TEMPERATURE: 97.9 F

## 2018-10-24 DIAGNOSIS — R10.9 ABDOMINAL PAIN, UNSPECIFIED ABDOMINAL LOCATION: ICD-10-CM

## 2018-10-24 DIAGNOSIS — R11.2 NON-INTRACTABLE VOMITING WITH NAUSEA, UNSPECIFIED VOMITING TYPE: Primary | ICD-10-CM

## 2018-10-24 LAB
ALBUMIN SERPL-MCNC: 3.8 GM/DL (ref 3.4–5)
ALP BLD-CCNC: 129 IU/L (ref 40–129)
ALT SERPL-CCNC: 27 U/L (ref 10–40)
ANION GAP SERPL CALCULATED.3IONS-SCNC: 12 MMOL/L (ref 4–16)
APTT: 28.5 SECONDS (ref 21.2–33)
AST SERPL-CCNC: 25 IU/L (ref 15–37)
BACTERIA: NEGATIVE /HPF
BASOPHILS ABSOLUTE: 0 K/CU MM
BASOPHILS RELATIVE PERCENT: 0.6 % (ref 0–1)
BILIRUB SERPL-MCNC: 0.1 MG/DL (ref 0–1)
BILIRUBIN URINE: NEGATIVE MG/DL
BLOOD, URINE: NEGATIVE
BUN BLDV-MCNC: 16 MG/DL (ref 6–23)
CALCIUM SERPL-MCNC: 10.4 MG/DL (ref 8.3–10.6)
CHLORIDE BLD-SCNC: 104 MMOL/L (ref 99–110)
CLARITY: ABNORMAL
CO2: 21 MMOL/L (ref 21–32)
COLOR: YELLOW
CREAT SERPL-MCNC: 0.6 MG/DL (ref 0.6–1.1)
DIFFERENTIAL TYPE: ABNORMAL
EOSINOPHILS ABSOLUTE: 0.4 K/CU MM
EOSINOPHILS RELATIVE PERCENT: 9.2 % (ref 0–3)
GFR AFRICAN AMERICAN: >60 ML/MIN/1.73M2
GFR NON-AFRICAN AMERICAN: >60 ML/MIN/1.73M2
GLUCOSE BLD-MCNC: 115 MG/DL (ref 70–99)
GLUCOSE, URINE: NEGATIVE MG/DL
HCT VFR BLD CALC: 35.6 % (ref 37–47)
HEMOGLOBIN: 10.7 GM/DL (ref 12.5–16)
IMMATURE NEUTROPHIL %: 0.2 % (ref 0–0.43)
INR BLD: 0.96 INDEX
KETONES, URINE: NEGATIVE MG/DL
LEUKOCYTE ESTERASE, URINE: NEGATIVE
LIPASE: 18 IU/L (ref 13–60)
LYMPHOCYTES ABSOLUTE: 1.9 K/CU MM
LYMPHOCYTES RELATIVE PERCENT: 39.2 % (ref 24–44)
MCH RBC QN AUTO: 26.3 PG (ref 27–31)
MCHC RBC AUTO-ENTMCNC: 30.1 % (ref 32–36)
MCV RBC AUTO: 87.5 FL (ref 78–100)
MONOCYTES ABSOLUTE: 0.3 K/CU MM
MONOCYTES RELATIVE PERCENT: 6.1 % (ref 0–4)
MUCUS: ABNORMAL HPF
NITRITE URINE, QUANTITATIVE: NEGATIVE
NUCLEATED RBC %: 0 %
PDW BLD-RTO: 13.4 % (ref 11.7–14.9)
PH, URINE: 5 (ref 5–8)
PLATELET # BLD: 213 K/CU MM (ref 140–440)
PMV BLD AUTO: 10 FL (ref 7.5–11.1)
POTASSIUM SERPL-SCNC: 4.3 MMOL/L (ref 3.5–5.1)
PROTEIN UA: NEGATIVE MG/DL
PROTHROMBIN TIME: 10.9 SECONDS (ref 9.12–12.5)
RBC # BLD: 4.07 M/CU MM (ref 4.2–5.4)
RBC URINE: ABNORMAL /HPF (ref 0–6)
SEGMENTED NEUTROPHILS ABSOLUTE COUNT: 2.1 K/CU MM
SEGMENTED NEUTROPHILS RELATIVE PERCENT: 44.7 % (ref 36–66)
SODIUM BLD-SCNC: 137 MMOL/L (ref 135–145)
SPECIFIC GRAVITY UA: 1.02 (ref 1–1.03)
SQUAMOUS EPITHELIAL: 17 /HPF
TOTAL IMMATURE NEUTOROPHIL: 0.01 K/CU MM
TOTAL NUCLEATED RBC: 0 K/CU MM
TOTAL PROTEIN: 6.5 GM/DL (ref 6.4–8.2)
TRICHOMONAS: ABNORMAL /HPF
UROBILINOGEN, URINE: NORMAL MG/DL (ref 0.2–1)
WBC # BLD: 4.8 K/CU MM (ref 4–10.5)
WBC UA: 2 /HPF (ref 0–5)

## 2018-10-24 PROCEDURE — 96372 THER/PROPH/DIAG INJ SC/IM: CPT

## 2018-10-24 PROCEDURE — 36415 COLL VENOUS BLD VENIPUNCTURE: CPT

## 2018-10-24 PROCEDURE — 85610 PROTHROMBIN TIME: CPT

## 2018-10-24 PROCEDURE — 81001 URINALYSIS AUTO W/SCOPE: CPT

## 2018-10-24 PROCEDURE — 96375 TX/PRO/DX INJ NEW DRUG ADDON: CPT

## 2018-10-24 PROCEDURE — 6360000002 HC RX W HCPCS: Performed by: PHYSICIAN ASSISTANT

## 2018-10-24 PROCEDURE — 96374 THER/PROPH/DIAG INJ IV PUSH: CPT

## 2018-10-24 PROCEDURE — 2580000003 HC RX 258: Performed by: PHYSICIAN ASSISTANT

## 2018-10-24 PROCEDURE — 83690 ASSAY OF LIPASE: CPT

## 2018-10-24 PROCEDURE — 99284 EMERGENCY DEPT VISIT MOD MDM: CPT

## 2018-10-24 PROCEDURE — 85730 THROMBOPLASTIN TIME PARTIAL: CPT

## 2018-10-24 PROCEDURE — 85025 COMPLETE CBC W/AUTO DIFF WBC: CPT

## 2018-10-24 PROCEDURE — 96361 HYDRATE IV INFUSION ADD-ON: CPT

## 2018-10-24 PROCEDURE — C9113 INJ PANTOPRAZOLE SODIUM, VIA: HCPCS | Performed by: PHYSICIAN ASSISTANT

## 2018-10-24 PROCEDURE — 80053 COMPREHEN METABOLIC PANEL: CPT

## 2018-10-24 RX ORDER — PROMETHAZINE HYDROCHLORIDE 25 MG/1
25 SUPPOSITORY RECTAL EVERY 6 HOURS PRN
Qty: 6 SUPPOSITORY | Refills: 0 | Status: SHIPPED | OUTPATIENT
Start: 2018-10-24 | End: 2018-10-24

## 2018-10-24 RX ORDER — HEPARIN SODIUM (PORCINE) LOCK FLUSH IV SOLN 100 UNIT/ML 100 UNIT/ML
100 SOLUTION INTRAVENOUS ONCE
Status: COMPLETED | OUTPATIENT
Start: 2018-10-24 | End: 2018-10-24

## 2018-10-24 RX ORDER — MORPHINE SULFATE 4 MG/ML
4 INJECTION, SOLUTION INTRAMUSCULAR; INTRAVENOUS EVERY 30 MIN PRN
Status: DISCONTINUED | OUTPATIENT
Start: 2018-10-24 | End: 2018-10-24 | Stop reason: HOSPADM

## 2018-10-24 RX ORDER — PROMETHAZINE HYDROCHLORIDE 25 MG/1
25 TABLET ORAL EVERY 6 HOURS PRN
Qty: 10 TABLET | Refills: 0 | Status: SHIPPED | OUTPATIENT
Start: 2018-10-24 | End: 2018-10-31

## 2018-10-24 RX ORDER — ONDANSETRON 2 MG/ML
4 INJECTION INTRAMUSCULAR; INTRAVENOUS EVERY 30 MIN PRN
Status: DISCONTINUED | OUTPATIENT
Start: 2018-10-24 | End: 2018-10-24 | Stop reason: HOSPADM

## 2018-10-24 RX ORDER — ONDANSETRON 4 MG/1
4 TABLET, ORALLY DISINTEGRATING ORAL ONCE
Status: COMPLETED | OUTPATIENT
Start: 2018-10-24 | End: 2018-10-24

## 2018-10-24 RX ORDER — 0.9 % SODIUM CHLORIDE 0.9 %
1000 INTRAVENOUS SOLUTION INTRAVENOUS ONCE
Status: COMPLETED | OUTPATIENT
Start: 2018-10-24 | End: 2018-10-24

## 2018-10-24 RX ORDER — FERROUS SULFATE 325(65) MG
325 TABLET ORAL
COMMUNITY
End: 2019-01-25

## 2018-10-24 RX ORDER — DICYCLOMINE HYDROCHLORIDE 10 MG/ML
20 INJECTION INTRAMUSCULAR ONCE
Status: COMPLETED | OUTPATIENT
Start: 2018-10-24 | End: 2018-10-24

## 2018-10-24 RX ORDER — PANTOPRAZOLE SODIUM 40 MG/10ML
40 INJECTION, POWDER, LYOPHILIZED, FOR SOLUTION INTRAVENOUS ONCE
Status: COMPLETED | OUTPATIENT
Start: 2018-10-24 | End: 2018-10-24

## 2018-10-24 RX ADMIN — PANTOPRAZOLE SODIUM 40 MG: 40 INJECTION, POWDER, FOR SOLUTION INTRAVENOUS at 09:15

## 2018-10-24 RX ADMIN — SODIUM CHLORIDE 1000 ML: 9 INJECTION, SOLUTION INTRAVENOUS at 09:11

## 2018-10-24 RX ADMIN — DICYCLOMINE HYDROCHLORIDE 20 MG: 20 INJECTION, SOLUTION INTRAMUSCULAR at 09:12

## 2018-10-24 RX ADMIN — MORPHINE SULFATE 4 MG: 4 INJECTION INTRAVENOUS at 09:14

## 2018-10-24 RX ADMIN — SODIUM CHLORIDE, PRESERVATIVE FREE 100 UNITS: 5 INJECTION INTRAVENOUS at 11:55

## 2018-10-24 RX ADMIN — ONDANSETRON 4 MG: 4 TABLET, ORALLY DISINTEGRATING ORAL at 10:30

## 2018-10-24 RX ADMIN — ONDANSETRON HYDROCHLORIDE 4 MG: 2 INJECTION, SOLUTION INTRAMUSCULAR; INTRAVENOUS at 09:13

## 2018-10-24 ASSESSMENT — PAIN SCALES - GENERAL
PAINLEVEL_OUTOF10: 6
PAINLEVEL_OUTOF10: 7

## 2018-10-24 ASSESSMENT — PAIN DESCRIPTION - PAIN TYPE
TYPE: ACUTE PAIN
TYPE: ACUTE PAIN

## 2018-10-24 ASSESSMENT — PAIN DESCRIPTION - FREQUENCY
FREQUENCY: CONTINUOUS
FREQUENCY: CONTINUOUS

## 2018-10-24 ASSESSMENT — PAIN DESCRIPTION - DESCRIPTORS
DESCRIPTORS: CONSTANT
DESCRIPTORS: CONSTANT

## 2018-10-24 ASSESSMENT — PAIN DESCRIPTION - LOCATION
LOCATION: ABDOMEN
LOCATION: ABDOMEN

## 2018-10-24 ASSESSMENT — PAIN DESCRIPTION - ORIENTATION
ORIENTATION: RIGHT;UPPER
ORIENTATION: RIGHT;UPPER

## 2018-10-24 NOTE — ED PROVIDER NOTES
Patient was given scripts for the following medications. I counseled patient how to take these medications. New Prescriptions    PROMETHAZINE (PHENERGAN) 25 MG TABLET    Take 1 tablet by mouth every 6 hours as needed for Nausea WARNING:  May cause drowsiness. May impair ability to operate vehicles or machinery. Do not use in combination with alcohol. This chart was generated using the [address] [place], [place] [address]Th  dictation system. I created this record but it may contain dictation errors given the limitations of this technology.         Don Sanchez PA-C  10/24/18 1156

## 2018-10-31 ENCOUNTER — OFFICE VISIT (OUTPATIENT)
Dept: BARIATRICS/WEIGHT MGMT | Age: 48
End: 2018-10-31
Payer: COMMERCIAL

## 2018-10-31 VITALS
BODY MASS INDEX: 50.02 KG/M2 | DIASTOLIC BLOOD PRESSURE: 72 MMHG | WEIGHT: 293 LBS | SYSTOLIC BLOOD PRESSURE: 126 MMHG | HEIGHT: 64 IN | HEART RATE: 68 BPM | RESPIRATION RATE: 14 BRPM

## 2018-10-31 DIAGNOSIS — E66.01 MORBID OBESITY WITH BMI OF 50.0-59.9, ADULT (HCC): Primary | ICD-10-CM

## 2018-10-31 PROCEDURE — G8484 FLU IMMUNIZE NO ADMIN: HCPCS | Performed by: SURGERY

## 2018-10-31 PROCEDURE — 3017F COLORECTAL CA SCREEN DOC REV: CPT | Performed by: SURGERY

## 2018-10-31 PROCEDURE — 99214 OFFICE O/P EST MOD 30 MIN: CPT | Performed by: SURGERY

## 2018-10-31 PROCEDURE — G8427 DOCREV CUR MEDS BY ELIG CLIN: HCPCS | Performed by: SURGERY

## 2018-10-31 PROCEDURE — 1036F TOBACCO NON-USER: CPT | Performed by: SURGERY

## 2018-10-31 PROCEDURE — G8417 CALC BMI ABV UP PARAM F/U: HCPCS | Performed by: SURGERY

## 2018-10-31 ASSESSMENT — ENCOUNTER SYMPTOMS
PHOTOPHOBIA: 0
ABDOMINAL PAIN: 1
ANAL BLEEDING: 0
VOMITING: 0
VOICE CHANGE: 0
COUGH: 0
NAUSEA: 0
CONSTIPATION: 0
SHORTNESS OF BREATH: 1
COLOR CHANGE: 0
BACK PAIN: 1
SORE THROAT: 0
WHEEZING: 0
BLOOD IN STOOL: 0
TROUBLE SWALLOWING: 0
DIARRHEA: 0
ABDOMINAL DISTENTION: 1

## 2018-10-31 NOTE — PROGRESS NOTES
ENDOSCOPY  08/27/2018     Current Outpatient Prescriptions   Medication Sig Dispense Refill    ferrous sulfate 325 (65 Fe) MG tablet Take 325 mg by mouth daily (with breakfast)      promethazine (PHENERGAN) 25 MG tablet Take 1 tablet by mouth every 6 hours as needed for Nausea WARNING:  May cause drowsiness. May impair ability to operate vehicles or machinery. Do not use in combination with alcohol. 10 tablet 0    albuterol sulfate HFA (VENTOLIN HFA) 108 (90 Base) MCG/ACT inhaler Inhale 2 puffs into the lungs every 6 hours as needed for Wheezing 1 Inhaler 3    HYDROcodone-acetaminophen (NORCO) 7.5-325 MG per tablet Take 1 tablet by mouth every 6 hours as needed for Pain. Alysa Nikko       tiZANidine (ZANAFLEX) 4 MG tablet Take 1 tablet by mouth 2 times daily (Patient taking differently: Take 4 mg by mouth nightly ) 60 tablet 0    levothyroxine (SYNTHROID) 125 MCG tablet Take 1 tablet by mouth Daily (Patient taking differently: Take 125 mcg by mouth nightly ) 30 tablet 0    sucralfate (CARAFATE) 1 GM/10ML suspension Take 10 mLs by mouth 4 times daily (before meals and nightly) 1200 mL 0    vitamin C (ASCORBIC ACID) 500 MG tablet Take 500 mg by mouth every morning Over The Counter      dicyclomine (BENTYL) 10 MG capsule Take 1 capsule by mouth 4 times daily as needed (abdominal cramping) 20 capsule 0    estradiol (ESTRACE) 1 MG tablet Take 1 mg by mouth nightly      milnacipran HCl (SAVELLA) 50 MG TABS Take 50 mg by mouth 2 times daily      Cholecalciferol (VITAMIN D3) 5000 UNITS TABS Take by mouth every morning Over The Counter      OMEPRAZOLE PO Take 20 mg by mouth 2 times daily      cloNIDine (CATAPRES) 0.1 MG tablet Take 0.1 mg by mouth 2 times daily      gabapentin (NEURONTIN) 800 MG tablet Take 800 mg by mouth 3 times daily      atenolol (TENORMIN) 100 MG tablet Take 100 mg by mouth nightly       PARoxetine (PAXIL) 30 MG tablet Take 30 mg by mouth nightly        No current facility-administered

## 2018-11-02 ENCOUNTER — TELEPHONE (OUTPATIENT)
Dept: CARDIOLOGY CLINIC | Age: 48
End: 2018-11-02

## 2018-11-19 ENCOUNTER — TELEPHONE (OUTPATIENT)
Dept: BARIATRICS/WEIGHT MGMT | Age: 48
End: 2018-11-19

## 2018-12-03 ENCOUNTER — TELEPHONE (OUTPATIENT)
Dept: BARIATRICS/WEIGHT MGMT | Age: 48
End: 2018-12-03

## 2018-12-03 ENCOUNTER — OFFICE VISIT (OUTPATIENT)
Dept: BARIATRICS/WEIGHT MGMT | Age: 48
End: 2018-12-03
Payer: COMMERCIAL

## 2018-12-03 ENCOUNTER — HOSPITAL ENCOUNTER (EMERGENCY)
Age: 48
Discharge: HOME | End: 2018-12-04
Attending: EMERGENCY MEDICINE
Payer: COMMERCIAL

## 2018-12-03 VITALS
RESPIRATION RATE: 14 BRPM | HEIGHT: 64 IN | DIASTOLIC BLOOD PRESSURE: 75 MMHG | BODY MASS INDEX: 50.02 KG/M2 | SYSTOLIC BLOOD PRESSURE: 130 MMHG | HEART RATE: 85 BPM | WEIGHT: 293 LBS

## 2018-12-03 DIAGNOSIS — L02.619 CELLULITIS AND ABSCESS OF FOOT EXCLUDING TOE: Primary | ICD-10-CM

## 2018-12-03 DIAGNOSIS — L97.521 SKIN ULCER OF LEFT FOOT, LIMITED TO BREAKDOWN OF SKIN (HCC): ICD-10-CM

## 2018-12-03 DIAGNOSIS — L03.119 CELLULITIS AND ABSCESS OF FOOT EXCLUDING TOE: Primary | ICD-10-CM

## 2018-12-03 DIAGNOSIS — E66.01 MORBID OBESITY WITH BMI OF 50.0-59.9, ADULT (HCC): Primary | ICD-10-CM

## 2018-12-03 LAB
ALBUMIN SERPL-MCNC: 4.2 GM/DL (ref 3.4–5)
ALP BLD-CCNC: 141 IU/L (ref 40–129)
ALT SERPL-CCNC: 54 U/L (ref 10–40)
ANION GAP SERPL CALCULATED.3IONS-SCNC: 14 MMOL/L (ref 4–16)
AST SERPL-CCNC: 54 IU/L (ref 15–37)
BASOPHILS ABSOLUTE: 0.1 K/CU MM
BASOPHILS RELATIVE PERCENT: 0.9 % (ref 0–1)
BILIRUB SERPL-MCNC: 0.3 MG/DL (ref 0–1)
BUN BLDV-MCNC: 15 MG/DL (ref 6–23)
CALCIUM SERPL-MCNC: 11.3 MG/DL (ref 8.3–10.6)
CHLORIDE BLD-SCNC: 104 MMOL/L (ref 99–110)
CO2: 20 MMOL/L (ref 21–32)
CREAT SERPL-MCNC: 0.6 MG/DL (ref 0.6–1.1)
DIFFERENTIAL TYPE: ABNORMAL
EOSINOPHILS ABSOLUTE: 0.4 K/CU MM
EOSINOPHILS RELATIVE PERCENT: 6.6 % (ref 0–3)
GFR AFRICAN AMERICAN: >60 ML/MIN/1.73M2
GFR NON-AFRICAN AMERICAN: >60 ML/MIN/1.73M2
GLUCOSE BLD-MCNC: 101 MG/DL (ref 70–99)
HCT VFR BLD CALC: 38.5 % (ref 37–47)
HEMOGLOBIN: 11.5 GM/DL (ref 12.5–16)
IMMATURE NEUTROPHIL %: 0.2 % (ref 0–0.43)
LYMPHOCYTES ABSOLUTE: 1.9 K/CU MM
LYMPHOCYTES RELATIVE PERCENT: 33.4 % (ref 24–44)
MCH RBC QN AUTO: 25.6 PG (ref 27–31)
MCHC RBC AUTO-ENTMCNC: 29.9 % (ref 32–36)
MCV RBC AUTO: 85.7 FL (ref 78–100)
MONOCYTES ABSOLUTE: 0.3 K/CU MM
MONOCYTES RELATIVE PERCENT: 5.2 % (ref 0–4)
NUCLEATED RBC %: 0 %
PDW BLD-RTO: 14.6 % (ref 11.7–14.9)
PLATELET # BLD: 225 K/CU MM (ref 140–440)
PMV BLD AUTO: 10.3 FL (ref 7.5–11.1)
POTASSIUM SERPL-SCNC: 4.3 MMOL/L (ref 3.5–5.1)
RBC # BLD: 4.49 M/CU MM (ref 4.2–5.4)
SEGMENTED NEUTROPHILS ABSOLUTE COUNT: 3 K/CU MM
SEGMENTED NEUTROPHILS RELATIVE PERCENT: 53.7 % (ref 36–66)
SODIUM BLD-SCNC: 138 MMOL/L (ref 135–145)
TOTAL IMMATURE NEUTOROPHIL: 0.01 K/CU MM
TOTAL NUCLEATED RBC: 0 K/CU MM
TOTAL PROTEIN: 7.8 GM/DL (ref 6.4–8.2)
WBC # BLD: 5.6 K/CU MM (ref 4–10.5)

## 2018-12-03 PROCEDURE — 1036F TOBACCO NON-USER: CPT | Performed by: NURSE PRACTITIONER

## 2018-12-03 PROCEDURE — G8427 DOCREV CUR MEDS BY ELIG CLIN: HCPCS | Performed by: NURSE PRACTITIONER

## 2018-12-03 PROCEDURE — 80053 COMPREHEN METABOLIC PANEL: CPT

## 2018-12-03 PROCEDURE — 99283 EMERGENCY DEPT VISIT LOW MDM: CPT

## 2018-12-03 PROCEDURE — 2500000003 HC RX 250 WO HCPCS: Performed by: EMERGENCY MEDICINE

## 2018-12-03 PROCEDURE — 99213 OFFICE O/P EST LOW 20 MIN: CPT | Performed by: NURSE PRACTITIONER

## 2018-12-03 PROCEDURE — 85025 COMPLETE CBC W/AUTO DIFF WBC: CPT

## 2018-12-03 PROCEDURE — 6360000002 HC RX W HCPCS: Performed by: EMERGENCY MEDICINE

## 2018-12-03 PROCEDURE — 36415 COLL VENOUS BLD VENIPUNCTURE: CPT

## 2018-12-03 PROCEDURE — G8484 FLU IMMUNIZE NO ADMIN: HCPCS | Performed by: NURSE PRACTITIONER

## 2018-12-03 PROCEDURE — 6370000000 HC RX 637 (ALT 250 FOR IP): Performed by: EMERGENCY MEDICINE

## 2018-12-03 PROCEDURE — 96372 THER/PROPH/DIAG INJ SC/IM: CPT

## 2018-12-03 PROCEDURE — G8417 CALC BMI ABV UP PARAM F/U: HCPCS | Performed by: NURSE PRACTITIONER

## 2018-12-03 PROCEDURE — 3017F COLORECTAL CA SCREEN DOC REV: CPT | Performed by: NURSE PRACTITIONER

## 2018-12-03 RX ORDER — SULFAMETHOXAZOLE AND TRIMETHOPRIM 800; 160 MG/1; MG/1
1 TABLET ORAL ONCE
Status: COMPLETED | OUTPATIENT
Start: 2018-12-03 | End: 2018-12-03

## 2018-12-03 RX ORDER — LIDOCAINE HYDROCHLORIDE AND EPINEPHRINE 10; 10 MG/ML; UG/ML
20 INJECTION, SOLUTION INFILTRATION; PERINEURAL ONCE
Status: COMPLETED | OUTPATIENT
Start: 2018-12-03 | End: 2018-12-03

## 2018-12-03 RX ORDER — PROMETHAZINE HYDROCHLORIDE 25 MG/ML
25 INJECTION, SOLUTION INTRAMUSCULAR; INTRAVENOUS ONCE
Status: COMPLETED | OUTPATIENT
Start: 2018-12-03 | End: 2018-12-03

## 2018-12-03 RX ORDER — AMITRIPTYLINE HYDROCHLORIDE 25 MG/1
25 TABLET, FILM COATED ORAL NIGHTLY
COMMUNITY
End: 2019-01-25

## 2018-12-03 RX ORDER — OXYCODONE HYDROCHLORIDE AND ACETAMINOPHEN 5; 325 MG/1; MG/1
1 TABLET ORAL ONCE
Status: COMPLETED | OUTPATIENT
Start: 2018-12-03 | End: 2018-12-03

## 2018-12-03 RX ORDER — SULFAMETHOXAZOLE AND TRIMETHOPRIM 800; 160 MG/1; MG/1
1 TABLET ORAL 2 TIMES DAILY
Qty: 20 TABLET | Refills: 0 | Status: SHIPPED | OUTPATIENT
Start: 2018-12-03 | End: 2018-12-13

## 2018-12-03 RX ORDER — CEPHALEXIN 250 MG/1
500 CAPSULE ORAL ONCE
Status: COMPLETED | OUTPATIENT
Start: 2018-12-03 | End: 2018-12-03

## 2018-12-03 RX ORDER — PROMETHAZINE HYDROCHLORIDE 25 MG/1
25 TABLET ORAL EVERY 6 HOURS PRN
Qty: 20 TABLET | Refills: 0 | Status: SHIPPED | OUTPATIENT
Start: 2018-12-03 | End: 2018-12-10

## 2018-12-03 RX ORDER — CEPHALEXIN 500 MG/1
500 CAPSULE ORAL 4 TIMES DAILY
Qty: 40 CAPSULE | Refills: 0 | Status: SHIPPED | OUTPATIENT
Start: 2018-12-03 | End: 2018-12-13

## 2018-12-03 RX ORDER — TEMAZEPAM 15 MG/1
30 CAPSULE ORAL NIGHTLY PRN
COMMUNITY
End: 2019-01-25

## 2018-12-03 RX ADMIN — SULFAMETHOXAZOLE AND TRIMETHOPRIM 1 TABLET: 800; 160 TABLET ORAL at 22:45

## 2018-12-03 RX ADMIN — CEPHALEXIN 500 MG: 250 CAPSULE ORAL at 22:45

## 2018-12-03 RX ADMIN — PROMETHAZINE HYDROCHLORIDE 25 MG: 25 INJECTION INTRAMUSCULAR; INTRAVENOUS at 22:01

## 2018-12-03 RX ADMIN — OXYCODONE AND ACETAMINOPHEN 1 TABLET: 5; 325 TABLET ORAL at 22:45

## 2018-12-03 RX ADMIN — LIDOCAINE HYDROCHLORIDE AND EPINEPHRINE 20 ML: 10; 10 INJECTION, SOLUTION INFILTRATION; PERINEURAL at 23:45

## 2018-12-03 ASSESSMENT — PAIN DESCRIPTION - LOCATION: LOCATION: LEG

## 2018-12-03 ASSESSMENT — PAIN DESCRIPTION - ORIENTATION: ORIENTATION: LEFT

## 2018-12-03 ASSESSMENT — ENCOUNTER SYMPTOMS
ABDOMINAL PAIN: 0
RHINORRHEA: 0
TROUBLE SWALLOWING: 0
DIARRHEA: 0
SHORTNESS OF BREATH: 0
COLOR CHANGE: 1
ABDOMINAL DISTENTION: 0
EYE PAIN: 0
NAUSEA: 0
BACK PAIN: 1
WHEEZING: 0
CHEST TIGHTNESS: 0

## 2018-12-03 ASSESSMENT — PAIN DESCRIPTION - PAIN TYPE: TYPE: ACUTE PAIN

## 2018-12-03 ASSESSMENT — PAIN SCALES - GENERAL
PAINLEVEL_OUTOF10: 8

## 2018-12-03 NOTE — PROGRESS NOTES
Arthritis     Hands, Back And Ankles    Bleeding ulcer 2014    \"I Had Ulcers In My Stomach And Colon\"    Bronchitis Last Episode 2014    Chronic back pain     Chronic pain     Sees Dr. Adriana Chong At [address] Depression     Fibromyalgia Dx 2013    H/O echocardiogram 8/11/15    EF >55%. LA to be at the upper limit of normal in size. LV hypertrophy with normal LV systolic, but abnormal diastolic function. Normal valvular structures and function.  H/O echocardiogram 08/30/2018    EF 55-60%    History of blood transfusion 09/2015 And 2018    No Reaction To Blood Transfusions Received    [place] (hard of hearing)     Right Ear    HTN (hypertension)     \"on medication since 2013\"    Hx of cardiac catheterization 10/24/2010    Angiographically normal coronary arteries w/ normal LV function and wall motion.  Hx of transesophageal echocardiography (PILO) for monitoring 12/2/2010    EF 55-60%. WNL.     Lupus Dx 2013    Nausea     \"Most Of The Time\"    Pain management     Sees Dr. Roberth Levy Pancreatitis chronic Dx 2001    Pneumonia Dx 11-15    Shortness of breath on exertion     Staph infection Dx 11-15    Left Foot    Super obesity     Teeth missing     Upper And Lower    Thyroid disease     UTI (urinary tract infection) In Past    No Current Symptoms    Wears glasses     To Read      Patient Active Problem List   Diagnosis    Abdominal pain    Rectal bleeding    Fever    Upper GI bleed    Fibromyalgia    Lupus (systemic lupus erythematosus) (HCC)    Nausea & vomiting    Chest pain    Chronic pancreatitis (HCC)    HTN (hypertension)    Acute pancreatitis    Right-sided chest pain    Super obesity    Normocytic anemia    Hypokalemia    Generalized abdominal pain    Pneumonia of both lungs due to infectious organism    Foot ulcer, left ([address]    SLE (systemic lupus erythematosus related syndrome) (HCC)    Hypoxia    Cellulitis    Pancytopenia (HCC)    Pleurisy    Frequent UTI leg swelling. Gastrointestinal: Negative for abdominal distention, abdominal pain, diarrhea and nausea. Endocrine: Negative for cold intolerance and polydipsia. Genitourinary: Negative for difficulty urinating and frequency. Musculoskeletal: Positive for arthralgias, back pain and gait problem. Skin: Positive for color change and wound (Left foot. ). Negative for rash. Allergic/Immunologic: Negative for environmental allergies. Neurological: Negative for dizziness, seizures and syncope. Hematological: Does not bruise/bleed easily. Psychiatric/Behavioral: Negative for behavioral problems and suicidal ideas. OBJECTIVE:    /75 (Site: Right Lower Arm, Position: Sitting, Cuff Size: Medium Adult)   Pulse 85   Resp 14   Ht 5' 4.25\" (1.632 m)   Wt (!) 308 lb (139.7 kg)   BMI 52.46 kg/m²      Physical Exam   Constitutional: She is oriented to person, place, and time. She appears well-developed and well-nourished. Obese   HENT:   Head: Normocephalic and atraumatic. Right Ear: Hearing and ear canal normal.   Left Ear: Hearing and ear canal normal.   Nose: Nose normal.   Mouth/Throat: Uvula is midline and oropharynx is clear and moist.   Eyes: Pupils are equal, round, and reactive to light. Conjunctivae are normal.   Neck: Normal range of motion. Cardiovascular: Normal rate, regular rhythm and normal heart sounds. Pulses:       Dorsalis pedis pulses are 2+ on the left side. Posterior tibial pulses are 2+ on the left side. Pulmonary/Chest: Effort normal and breath sounds normal. She has no decreased breath sounds. She has no wheezes. She has no rhonchi. She has no rales. Abdominal: Soft. Bowel sounds are normal. There is no tenderness. Musculoskeletal: Normal range of motion. She exhibits no tenderness. Arms:  In all 4 extremities.     Feet:   Left Foot:   Skin Integrity: Positive for blister (Left medial foot with skin sloughing and blister formation. ), skin

## 2018-12-03 NOTE — PROGRESS NOTES
Patient dines out to a sit down restaurant 3 times per month. Patient eats fast food meals 2 times per month. Drinks mostly diet pepsi, water    24 hour recall/food frequency chart:  Breakfast: a slice of toast  Snack: no  Lunch: banana, a spoon of peanut butter  Snack: no  Dinner: stir fried chicken with rice  Snack: no    Total daily calories: not counting      Exercises walking 30 min 2  Times per day minus this past week while being sick.

## 2018-12-04 VITALS
BODY MASS INDEX: 50.02 KG/M2 | OXYGEN SATURATION: 98 % | DIASTOLIC BLOOD PRESSURE: 97 MMHG | TEMPERATURE: 98.4 F | RESPIRATION RATE: 16 BRPM | HEART RATE: 94 BPM | WEIGHT: 293 LBS | SYSTOLIC BLOOD PRESSURE: 172 MMHG | HEIGHT: 64 IN

## 2018-12-04 PROCEDURE — 6370000000 HC RX 637 (ALT 250 FOR IP)

## 2018-12-04 RX ORDER — DIAPER,BRIEF,INFANT-TODD,DISP
EACH MISCELLANEOUS
Status: COMPLETED
Start: 2018-12-04 | End: 2018-12-04

## 2018-12-04 RX ADMIN — BACITRACIN ZINC 1 G: 500 OINTMENT TOPICAL at 00:34

## 2018-12-04 NOTE — ED TRIAGE NOTES
Patient to ED with complaints of left foot pain, redness, swelling and right hand pain, redness. Patient reports hx of sepsis, staph infection which is similar to how she feels now. Patient alert and oriented x 4.

## 2019-01-07 ENCOUNTER — HOSPITAL ENCOUNTER (EMERGENCY)
Age: 49
Discharge: HOME | End: 2019-01-07
Attending: EMERGENCY MEDICINE
Payer: COMMERCIAL

## 2019-01-07 ENCOUNTER — APPOINTMENT (OUTPATIENT)
Dept: CT IMAGING | Age: 49
End: 2019-01-07
Payer: COMMERCIAL

## 2019-01-07 ENCOUNTER — APPOINTMENT (OUTPATIENT)
Dept: GENERAL RADIOLOGY | Age: 49
End: 2019-01-07
Payer: COMMERCIAL

## 2019-01-07 VITALS
TEMPERATURE: 97.7 F | RESPIRATION RATE: 16 BRPM | SYSTOLIC BLOOD PRESSURE: 135 MMHG | HEIGHT: 64 IN | WEIGHT: 293 LBS | HEART RATE: 59 BPM | DIASTOLIC BLOOD PRESSURE: 72 MMHG | OXYGEN SATURATION: 97 % | BODY MASS INDEX: 50.02 KG/M2

## 2019-01-07 DIAGNOSIS — R10.9 ABDOMINAL PAIN, UNSPECIFIED ABDOMINAL LOCATION: Primary | ICD-10-CM

## 2019-01-07 DIAGNOSIS — R11.0 NAUSEA: ICD-10-CM

## 2019-01-07 DIAGNOSIS — R11.11 VOMITING WITHOUT NAUSEA, INTRACTABILITY OF VOMITING NOT SPECIFIED, UNSPECIFIED VOMITING TYPE: ICD-10-CM

## 2019-01-07 LAB
ALBUMIN SERPL-MCNC: 4.1 GM/DL (ref 3.4–5)
ALP BLD-CCNC: 112 IU/L (ref 40–129)
ALT SERPL-CCNC: 35 U/L (ref 10–40)
ANION GAP SERPL CALCULATED.3IONS-SCNC: 14 MMOL/L (ref 4–16)
AST SERPL-CCNC: 30 IU/L (ref 15–37)
BACTERIA: NEGATIVE /HPF
BASOPHILS ABSOLUTE: 0 K/CU MM
BASOPHILS RELATIVE PERCENT: 0.6 % (ref 0–1)
BILIRUB SERPL-MCNC: 0.2 MG/DL (ref 0–1)
BILIRUBIN URINE: NEGATIVE MG/DL
BLOOD, URINE: NEGATIVE
BUN BLDV-MCNC: 13 MG/DL (ref 6–23)
CALCIUM SERPL-MCNC: 10.9 MG/DL (ref 8.3–10.6)
CHLORIDE BLD-SCNC: 103 MMOL/L (ref 99–110)
CLARITY: CLEAR
CO2: 20 MMOL/L (ref 21–32)
COLOR: COLORLESS
CREAT SERPL-MCNC: 0.7 MG/DL (ref 0.6–1.1)
DIFFERENTIAL TYPE: ABNORMAL
EOSINOPHILS ABSOLUTE: 0.4 K/CU MM
EOSINOPHILS RELATIVE PERCENT: 8.2 % (ref 0–3)
GFR AFRICAN AMERICAN: >60 ML/MIN/1.73M2
GFR NON-AFRICAN AMERICAN: >60 ML/MIN/1.73M2
GLUCOSE BLD-MCNC: 118 MG/DL (ref 70–99)
GLUCOSE, URINE: NEGATIVE MG/DL
HCT VFR BLD CALC: 37.9 % (ref 37–47)
HEMOGLOBIN: 11.2 GM/DL (ref 12.5–16)
IMMATURE NEUTROPHIL %: 0 % (ref 0–0.43)
KETONES, URINE: NEGATIVE MG/DL
LACTATE: 0.8 MMOL/L (ref 0.4–2)
LACTATE: 2.3 MMOL/L (ref 0.4–2)
LEUKOCYTE ESTERASE, URINE: ABNORMAL
LIPASE: 21 IU/L (ref 13–60)
LYMPHOCYTES ABSOLUTE: 2.2 K/CU MM
LYMPHOCYTES RELATIVE PERCENT: 44.3 % (ref 24–44)
MCH RBC QN AUTO: 25.9 PG (ref 27–31)
MCHC RBC AUTO-ENTMCNC: 29.6 % (ref 32–36)
MCV RBC AUTO: 87.5 FL (ref 78–100)
MONOCYTES ABSOLUTE: 0.2 K/CU MM
MONOCYTES RELATIVE PERCENT: 4.9 % (ref 0–4)
MUCUS: ABNORMAL HPF
NITRITE URINE, QUANTITATIVE: NEGATIVE
NUCLEATED RBC %: 0 %
PDW BLD-RTO: 14.7 % (ref 11.7–14.9)
PH, URINE: 6 (ref 5–8)
PLATELET # BLD: 205 K/CU MM (ref 140–440)
PMV BLD AUTO: 10.9 FL (ref 7.5–11.1)
POTASSIUM SERPL-SCNC: 4.4 MMOL/L (ref 3.5–5.1)
PRO-BNP: 444.6 PG/ML
PROTEIN UA: NEGATIVE MG/DL
RBC # BLD: 4.33 M/CU MM (ref 4.2–5.4)
RBC URINE: 1 /HPF (ref 0–6)
SEGMENTED NEUTROPHILS ABSOLUTE COUNT: 2.1 K/CU MM
SEGMENTED NEUTROPHILS RELATIVE PERCENT: 42 % (ref 36–66)
SODIUM BLD-SCNC: 137 MMOL/L (ref 135–145)
SPECIFIC GRAVITY UA: 1 (ref 1–1.03)
SQUAMOUS EPITHELIAL: 3 /HPF
TOTAL CK: 56 IU/L (ref 26–140)
TOTAL IMMATURE NEUTOROPHIL: 0 K/CU MM
TOTAL NUCLEATED RBC: 0 K/CU MM
TOTAL PROTEIN: 6.7 GM/DL (ref 6.4–8.2)
TRICHOMONAS: ABNORMAL /HPF
TROPONIN T: <0.01 NG/ML
UROBILINOGEN, URINE: NORMAL MG/DL (ref 0.2–1)
WBC # BLD: 4.9 K/CU MM (ref 4–10.5)
WBC UA: 1 /HPF (ref 0–5)

## 2019-01-07 PROCEDURE — 84484 ASSAY OF TROPONIN QUANT: CPT

## 2019-01-07 PROCEDURE — 71045 X-RAY EXAM CHEST 1 VIEW: CPT

## 2019-01-07 PROCEDURE — 36415 COLL VENOUS BLD VENIPUNCTURE: CPT

## 2019-01-07 PROCEDURE — 96372 THER/PROPH/DIAG INJ SC/IM: CPT

## 2019-01-07 PROCEDURE — 93005 ELECTROCARDIOGRAM TRACING: CPT | Performed by: EMERGENCY MEDICINE

## 2019-01-07 PROCEDURE — 81001 URINALYSIS AUTO W/SCOPE: CPT

## 2019-01-07 PROCEDURE — 74176 CT ABD & PELVIS W/O CONTRAST: CPT

## 2019-01-07 PROCEDURE — 83605 ASSAY OF LACTIC ACID: CPT

## 2019-01-07 PROCEDURE — 83690 ASSAY OF LIPASE: CPT

## 2019-01-07 PROCEDURE — 82550 ASSAY OF CK (CPK): CPT

## 2019-01-07 PROCEDURE — 96375 TX/PRO/DX INJ NEW DRUG ADDON: CPT

## 2019-01-07 PROCEDURE — 80053 COMPREHEN METABOLIC PANEL: CPT

## 2019-01-07 PROCEDURE — 6360000002 HC RX W HCPCS: Performed by: EMERGENCY MEDICINE

## 2019-01-07 PROCEDURE — 85025 COMPLETE CBC W/AUTO DIFF WBC: CPT

## 2019-01-07 PROCEDURE — 99284 EMERGENCY DEPT VISIT MOD MDM: CPT

## 2019-01-07 PROCEDURE — 96374 THER/PROPH/DIAG INJ IV PUSH: CPT

## 2019-01-07 PROCEDURE — S0028 INJECTION, FAMOTIDINE, 20 MG: HCPCS | Performed by: EMERGENCY MEDICINE

## 2019-01-07 PROCEDURE — 93010 ELECTROCARDIOGRAM REPORT: CPT | Performed by: INTERNAL MEDICINE

## 2019-01-07 PROCEDURE — 2580000003 HC RX 258: Performed by: EMERGENCY MEDICINE

## 2019-01-07 PROCEDURE — 83880 ASSAY OF NATRIURETIC PEPTIDE: CPT

## 2019-01-07 RX ORDER — FAMOTIDINE 20 MG/1
20 TABLET, FILM COATED ORAL 2 TIMES DAILY
Qty: 14 TABLET | Refills: 0 | Status: SHIPPED | OUTPATIENT
Start: 2019-01-07 | End: 2019-01-25 | Stop reason: SDUPTHER

## 2019-01-07 RX ORDER — MORPHINE SULFATE 4 MG/ML
4 INJECTION, SOLUTION INTRAMUSCULAR; INTRAVENOUS EVERY 30 MIN PRN
Status: DISCONTINUED | OUTPATIENT
Start: 2019-01-07 | End: 2019-01-07 | Stop reason: HOSPADM

## 2019-01-07 RX ORDER — PROMETHAZINE HYDROCHLORIDE 25 MG/1
25 TABLET ORAL EVERY 6 HOURS PRN
Qty: 15 TABLET | Refills: 0 | Status: SHIPPED | OUTPATIENT
Start: 2019-01-07 | End: 2019-01-14

## 2019-01-07 RX ORDER — ONDANSETRON 4 MG/1
4 TABLET, ORALLY DISINTEGRATING ORAL EVERY 8 HOURS PRN
Qty: 15 TABLET | Refills: 0 | Status: SHIPPED | OUTPATIENT
Start: 2019-01-07 | End: 2019-01-25 | Stop reason: SDUPTHER

## 2019-01-07 RX ORDER — HEPARIN SODIUM (PORCINE) LOCK FLUSH IV SOLN 100 UNIT/ML 100 UNIT/ML
100 SOLUTION INTRAVENOUS ONCE
Status: COMPLETED | OUTPATIENT
Start: 2019-01-07 | End: 2019-01-07

## 2019-01-07 RX ORDER — DICYCLOMINE HYDROCHLORIDE 10 MG/1
10 CAPSULE ORAL 3 TIMES DAILY
Qty: 15 CAPSULE | Refills: 3 | Status: SHIPPED | OUTPATIENT
Start: 2019-01-07 | End: 2019-01-25 | Stop reason: SDUPTHER

## 2019-01-07 RX ORDER — 0.9 % SODIUM CHLORIDE 0.9 %
1000 INTRAVENOUS SOLUTION INTRAVENOUS ONCE
Status: COMPLETED | OUTPATIENT
Start: 2019-01-07 | End: 2019-01-07

## 2019-01-07 RX ORDER — DICYCLOMINE HYDROCHLORIDE 10 MG/1
10 CAPSULE ORAL 3 TIMES DAILY
Qty: 15 CAPSULE | Refills: 3 | Status: ON HOLD | OUTPATIENT
Start: 2019-01-07 | End: 2019-02-13 | Stop reason: HOSPADM

## 2019-01-07 RX ORDER — FAMOTIDINE 20 MG/1
20 TABLET, FILM COATED ORAL 2 TIMES DAILY
Qty: 14 TABLET | Refills: 0 | Status: SHIPPED | OUTPATIENT
Start: 2019-01-07 | End: 2019-01-25

## 2019-01-07 RX ORDER — DICYCLOMINE HYDROCHLORIDE 10 MG/ML
20 INJECTION INTRAMUSCULAR ONCE
Status: COMPLETED | OUTPATIENT
Start: 2019-01-07 | End: 2019-01-07

## 2019-01-07 RX ORDER — CALCIUM CARBONATE 200(500)MG
1 TABLET,CHEWABLE ORAL DAILY
Qty: 30 TABLET | Refills: 0 | Status: SHIPPED | OUTPATIENT
Start: 2019-01-07 | End: 2019-01-25

## 2019-01-07 RX ORDER — ONDANSETRON 4 MG/1
4 TABLET, ORALLY DISINTEGRATING ORAL EVERY 8 HOURS PRN
Qty: 15 TABLET | Refills: 0 | Status: SHIPPED | OUTPATIENT
Start: 2019-01-07 | End: 2019-02-22

## 2019-01-07 RX ADMIN — DICYCLOMINE HYDROCHLORIDE 20 MG: 20 INJECTION, SOLUTION INTRAMUSCULAR at 15:05

## 2019-01-07 RX ADMIN — SODIUM CHLORIDE, PRESERVATIVE FREE 100 UNITS: 5 INJECTION INTRAVENOUS at 16:15

## 2019-01-07 RX ADMIN — MORPHINE SULFATE 4 MG: 4 INJECTION INTRAVENOUS at 14:22

## 2019-01-07 RX ADMIN — SODIUM CHLORIDE 1000 ML: 9 INJECTION, SOLUTION INTRAVENOUS at 14:22

## 2019-01-07 RX ADMIN — FAMOTIDINE 20 MG: 10 INJECTION, SOLUTION INTRAVENOUS at 14:22

## 2019-01-07 ASSESSMENT — PAIN SCALES - GENERAL
PAINLEVEL_OUTOF10: 8
PAINLEVEL_OUTOF10: 8
PAINLEVEL_OUTOF10: 7

## 2019-01-07 ASSESSMENT — ENCOUNTER SYMPTOMS
EYES NEGATIVE: 1
NAUSEA: 1
VOMITING: 1
DIARRHEA: 1
ALLERGIC/IMMUNOLOGIC NEGATIVE: 1
RESPIRATORY NEGATIVE: 1
ABDOMINAL PAIN: 1

## 2019-01-07 ASSESSMENT — PAIN DESCRIPTION - ORIENTATION: ORIENTATION: RIGHT;UPPER

## 2019-01-07 ASSESSMENT — PAIN DESCRIPTION - LOCATION: LOCATION: ABDOMEN

## 2019-01-07 ASSESSMENT — PAIN DESCRIPTION - PAIN TYPE: TYPE: ACUTE PAIN

## 2019-01-09 LAB
EKG ATRIAL RATE: 57 BPM
EKG DIAGNOSIS: NORMAL
EKG P AXIS: 44 DEGREES
EKG P-R INTERVAL: 190 MS
EKG Q-T INTERVAL: 422 MS
EKG QRS DURATION: 86 MS
EKG QTC CALCULATION (BAZETT): 410 MS
EKG R AXIS: -15 DEGREES
EKG T AXIS: 25 DEGREES
EKG VENTRICULAR RATE: 57 BPM

## 2019-01-10 ENCOUNTER — HOSPITAL ENCOUNTER (OUTPATIENT)
Age: 49
Setting detail: SPECIMEN
Discharge: HOME | End: 2019-01-10
Payer: COMMERCIAL

## 2019-01-10 LAB
FERRITIN: 45 NG/ML (ref 15–150)
IRON: 47 UG/DL (ref 37–145)
PCT TRANSFERRIN: 14 % (ref 10–44)
TOTAL IRON BINDING CAPACITY: 343 UG/DL (ref 250–450)
UNSATURATED IRON BINDING CAPACITY: 296 UG/DL (ref 110–370)

## 2019-01-10 PROCEDURE — 82728 ASSAY OF FERRITIN: CPT

## 2019-01-10 PROCEDURE — 83550 IRON BINDING TEST: CPT

## 2019-01-10 PROCEDURE — 83540 ASSAY OF IRON: CPT

## 2019-01-22 ENCOUNTER — OFFICE VISIT (OUTPATIENT)
Dept: CARDIOLOGY CLINIC | Age: 49
End: 2019-01-22
Payer: COMMERCIAL

## 2019-01-22 VITALS
SYSTOLIC BLOOD PRESSURE: 126 MMHG | WEIGHT: 293 LBS | HEIGHT: 64 IN | BODY MASS INDEX: 50.02 KG/M2 | DIASTOLIC BLOOD PRESSURE: 82 MMHG | HEART RATE: 68 BPM

## 2019-01-22 DIAGNOSIS — Z01.810 PREOP CARDIOVASCULAR EXAM: Primary | ICD-10-CM

## 2019-01-22 PROCEDURE — 99214 OFFICE O/P EST MOD 30 MIN: CPT | Performed by: INTERNAL MEDICINE

## 2019-01-22 PROCEDURE — G8427 DOCREV CUR MEDS BY ELIG CLIN: HCPCS | Performed by: INTERNAL MEDICINE

## 2019-01-22 PROCEDURE — 3017F COLORECTAL CA SCREEN DOC REV: CPT | Performed by: INTERNAL MEDICINE

## 2019-01-22 PROCEDURE — 1036F TOBACCO NON-USER: CPT | Performed by: INTERNAL MEDICINE

## 2019-01-22 PROCEDURE — G8417 CALC BMI ABV UP PARAM F/U: HCPCS | Performed by: INTERNAL MEDICINE

## 2019-01-22 PROCEDURE — G8484 FLU IMMUNIZE NO ADMIN: HCPCS | Performed by: INTERNAL MEDICINE

## 2019-01-24 ENCOUNTER — ANESTHESIA EVENT (OUTPATIENT)
Dept: OPERATING ROOM | Age: 49
DRG: 621 | End: 2019-01-24
Payer: COMMERCIAL

## 2019-01-24 ASSESSMENT — ENCOUNTER SYMPTOMS: SHORTNESS OF BREATH: 1

## 2019-01-25 ENCOUNTER — HOSPITAL ENCOUNTER (OUTPATIENT)
Dept: PREADMISSION TESTING | Age: 49
Discharge: HOME | End: 2019-01-29
Payer: COMMERCIAL

## 2019-01-25 VITALS
HEART RATE: 84 BPM | HEIGHT: 65 IN | OXYGEN SATURATION: 94 % | BODY MASS INDEX: 48.82 KG/M2 | DIASTOLIC BLOOD PRESSURE: 68 MMHG | WEIGHT: 293 LBS | RESPIRATION RATE: 16 BRPM | TEMPERATURE: 96 F | SYSTOLIC BLOOD PRESSURE: 154 MMHG

## 2019-01-25 RX ORDER — TEMAZEPAM 30 MG/1
30 CAPSULE ORAL NIGHTLY
Status: ON HOLD | COMMUNITY
End: 2019-02-18

## 2019-01-25 ASSESSMENT — PAIN SCALES - GENERAL: PAINLEVEL_OUTOF10: 0

## 2019-01-28 ENCOUNTER — OFFICE VISIT (OUTPATIENT)
Dept: BARIATRICS/WEIGHT MGMT | Age: 49
End: 2019-01-28

## 2019-01-28 VITALS
HEART RATE: 81 BPM | SYSTOLIC BLOOD PRESSURE: 142 MMHG | HEIGHT: 64 IN | BODY MASS INDEX: 50.02 KG/M2 | DIASTOLIC BLOOD PRESSURE: 75 MMHG | WEIGHT: 293 LBS

## 2019-01-28 DIAGNOSIS — E66.01 MORBID OBESITY WITH BMI OF 50.0-59.9, ADULT (HCC): Primary | ICD-10-CM

## 2019-01-28 PROCEDURE — 99999 PR OFFICE/OUTPT VISIT,PROCEDURE ONLY: CPT

## 2019-01-28 RX ORDER — FEEDER CONTAINER WITH PUMP SET
1 EACH MISCELLANEOUS 4 TIMES DAILY
Qty: 120 CAN | Refills: 0 | Status: SHIPPED | OUTPATIENT
Start: 2019-01-28 | End: 2019-07-06

## 2019-02-04 ENCOUNTER — TELEPHONE (OUTPATIENT)
Dept: SURGERY | Age: 49
End: 2019-02-04

## 2019-02-05 DIAGNOSIS — R11.0 NAUSEA: Primary | ICD-10-CM

## 2019-02-05 RX ORDER — PROMETHAZINE HYDROCHLORIDE 25 MG/1
25 TABLET ORAL EVERY 8 HOURS PRN
Qty: 21 TABLET | Refills: 0 | Status: ON HOLD | OUTPATIENT
Start: 2019-02-05 | End: 2019-02-13 | Stop reason: HOSPADM

## 2019-02-07 ENCOUNTER — TELEPHONE (OUTPATIENT)
Dept: SURGERY | Age: 49
End: 2019-02-07

## 2019-02-11 ENCOUNTER — TELEPHONE (OUTPATIENT)
Dept: SURGERY | Age: 49
End: 2019-02-11

## 2019-02-11 ENCOUNTER — OFFICE VISIT (OUTPATIENT)
Dept: BARIATRICS/WEIGHT MGMT | Age: 49
End: 2019-02-11
Payer: COMMERCIAL

## 2019-02-11 VITALS
SYSTOLIC BLOOD PRESSURE: 126 MMHG | WEIGHT: 293 LBS | DIASTOLIC BLOOD PRESSURE: 80 MMHG | HEART RATE: 90 BPM | HEIGHT: 64 IN | BODY MASS INDEX: 50.02 KG/M2

## 2019-02-11 DIAGNOSIS — E66.01 MORBID OBESITY WITH BMI OF 50.0-59.9, ADULT (HCC): ICD-10-CM

## 2019-02-11 DIAGNOSIS — Z01.818 PRE-OP EVALUATION: Primary | ICD-10-CM

## 2019-02-11 PROCEDURE — G8427 DOCREV CUR MEDS BY ELIG CLIN: HCPCS | Performed by: NURSE PRACTITIONER

## 2019-02-11 PROCEDURE — G8417 CALC BMI ABV UP PARAM F/U: HCPCS | Performed by: NURSE PRACTITIONER

## 2019-02-11 PROCEDURE — G8484 FLU IMMUNIZE NO ADMIN: HCPCS | Performed by: NURSE PRACTITIONER

## 2019-02-11 PROCEDURE — 1036F TOBACCO NON-USER: CPT | Performed by: NURSE PRACTITIONER

## 2019-02-11 PROCEDURE — 99213 OFFICE O/P EST LOW 20 MIN: CPT | Performed by: NURSE PRACTITIONER

## 2019-02-11 PROCEDURE — 3017F COLORECTAL CA SCREEN DOC REV: CPT | Performed by: NURSE PRACTITIONER

## 2019-02-11 ASSESSMENT — ENCOUNTER SYMPTOMS
SHORTNESS OF BREATH: 0
ABDOMINAL PAIN: 0
ABDOMINAL DISTENTION: 0
EYE PAIN: 0
WHEEZING: 0
CHEST TIGHTNESS: 0
DIARRHEA: 0
BACK PAIN: 1
NAUSEA: 0
RHINORRHEA: 0
TROUBLE SWALLOWING: 0

## 2019-02-12 ENCOUNTER — ANESTHESIA (OUTPATIENT)
Dept: OPERATING ROOM | Age: 49
DRG: 621 | End: 2019-02-12
Payer: COMMERCIAL

## 2019-02-12 ENCOUNTER — HOSPITAL ENCOUNTER (INPATIENT)
Age: 49
LOS: 2 days | Discharge: HOME HEALTH | DRG: 621 | End: 2019-02-14
Attending: SURGERY | Admitting: SURGERY
Payer: COMMERCIAL

## 2019-02-12 VITALS
DIASTOLIC BLOOD PRESSURE: 108 MMHG | OXYGEN SATURATION: 100 % | RESPIRATION RATE: 16 BRPM | SYSTOLIC BLOOD PRESSURE: 194 MMHG

## 2019-02-12 DIAGNOSIS — E66.01 MORBID OBESITY WITH BMI OF 50.0-59.9, ADULT (HCC): Primary | ICD-10-CM

## 2019-02-12 PROCEDURE — 6360000002 HC RX W HCPCS: Performed by: SURGERY

## 2019-02-12 PROCEDURE — 6360000002 HC RX W HCPCS

## 2019-02-12 PROCEDURE — 2580000003 HC RX 258: Performed by: SURGERY

## 2019-02-12 PROCEDURE — 3600000019 HC SURGERY ROBOT ADDTL 15MIN: Performed by: SURGERY

## 2019-02-12 PROCEDURE — 0BQT4ZZ REPAIR DIAPHRAGM, PERCUTANEOUS ENDOSCOPIC APPROACH: ICD-10-PCS | Performed by: SURGERY

## 2019-02-12 PROCEDURE — 6360000002 HC RX W HCPCS: Performed by: NURSE ANESTHETIST, CERTIFIED REGISTERED

## 2019-02-12 PROCEDURE — 6370000000 HC RX 637 (ALT 250 FOR IP): Performed by: SURGERY

## 2019-02-12 PROCEDURE — 0DB64Z3 EXCISION OF STOMACH, PERCUTANEOUS ENDOSCOPIC APPROACH, VERTICAL: ICD-10-PCS | Performed by: SURGERY

## 2019-02-12 PROCEDURE — 0DNU4ZZ RELEASE OMENTUM, PERCUTANEOUS ENDOSCOPIC APPROACH: ICD-10-PCS | Performed by: SURGERY

## 2019-02-12 PROCEDURE — 2500000003 HC RX 250 WO HCPCS: Performed by: SURGERY

## 2019-02-12 PROCEDURE — 7100000001 HC PACU RECOVERY - ADDTL 15 MIN: Performed by: SURGERY

## 2019-02-12 PROCEDURE — S2900 ROBOTIC SURGICAL SYSTEM: HCPCS | Performed by: SURGERY

## 2019-02-12 PROCEDURE — 2720000010 HC SURG SUPPLY STERILE: Performed by: SURGERY

## 2019-02-12 PROCEDURE — C9113 INJ PANTOPRAZOLE SODIUM, VIA: HCPCS | Performed by: SURGERY

## 2019-02-12 PROCEDURE — C9250 ARTISS FIBRIN SEALANT: HCPCS | Performed by: SURGERY

## 2019-02-12 PROCEDURE — 6360000002 HC RX W HCPCS: Performed by: ANESTHESIOLOGY

## 2019-02-12 PROCEDURE — 2709999900 HC NON-CHARGEABLE SUPPLY: Performed by: SURGERY

## 2019-02-12 PROCEDURE — 3600000009 HC SURGERY ROBOT BASE: Performed by: SURGERY

## 2019-02-12 PROCEDURE — 88307 TISSUE EXAM BY PATHOLOGIST: CPT

## 2019-02-12 PROCEDURE — 1200000000 HC SEMI PRIVATE

## 2019-02-12 PROCEDURE — 2580000003 HC RX 258: Performed by: NURSE ANESTHETIST, CERTIFIED REGISTERED

## 2019-02-12 PROCEDURE — 2500000003 HC RX 250 WO HCPCS: Performed by: NURSE ANESTHETIST, CERTIFIED REGISTERED

## 2019-02-12 PROCEDURE — 43775 LAP SLEEVE GASTRECTOMY: CPT | Performed by: SURGERY

## 2019-02-12 PROCEDURE — 3700000001 HC ADD 15 MINUTES (ANESTHESIA): Performed by: SURGERY

## 2019-02-12 PROCEDURE — C9290 INJ, BUPIVACAINE LIPOSOME: HCPCS | Performed by: SURGERY

## 2019-02-12 PROCEDURE — 2580000003 HC RX 258: Performed by: ANESTHESIOLOGY

## 2019-02-12 PROCEDURE — 7100000000 HC PACU RECOVERY - FIRST 15 MIN: Performed by: SURGERY

## 2019-02-12 PROCEDURE — 6360000002 HC RX W HCPCS: Performed by: STUDENT IN AN ORGANIZED HEALTH CARE EDUCATION/TRAINING PROGRAM

## 2019-02-12 PROCEDURE — 88342 IMHCHEM/IMCYTCHM 1ST ANTB: CPT

## 2019-02-12 PROCEDURE — 8E0W4CZ ROBOTIC ASSISTED PROCEDURE OF TRUNK REGION, PERCUTANEOUS ENDOSCOPIC APPROACH: ICD-10-PCS | Performed by: SURGERY

## 2019-02-12 PROCEDURE — 6370000000 HC RX 637 (ALT 250 FOR IP): Performed by: HOSPITALIST

## 2019-02-12 PROCEDURE — 2780000010 HC IMPLANT OTHER: Performed by: SURGERY

## 2019-02-12 PROCEDURE — 3700000000 HC ANESTHESIA ATTENDED CARE: Performed by: SURGERY

## 2019-02-12 DEVICE — SEALANT HEMSTAT 5ML HUM FIBRIN THROM 2 VI APPL DEV EVICEL: Type: IMPLANTABLE DEVICE | Site: ABDOMEN | Status: FUNCTIONAL

## 2019-02-12 DEVICE — TIP APPL L45CM FLX FOR SEAL EVICEL: Type: IMPLANTABLE DEVICE | Site: ABDOMEN | Status: FUNCTIONAL

## 2019-02-12 RX ORDER — ROCURONIUM BROMIDE 10 MG/ML
INJECTION, SOLUTION INTRAVENOUS PRN
Status: DISCONTINUED | OUTPATIENT
Start: 2019-02-12 | End: 2019-02-12 | Stop reason: SDUPTHER

## 2019-02-12 RX ORDER — TIZANIDINE 4 MG/1
4 TABLET ORAL NIGHTLY
Status: DISCONTINUED | OUTPATIENT
Start: 2019-02-12 | End: 2019-02-14 | Stop reason: HOSPADM

## 2019-02-12 RX ORDER — LEVOTHYROXINE SODIUM 0.12 MG/1
125 TABLET ORAL DAILY
Status: DISCONTINUED | OUTPATIENT
Start: 2019-02-12 | End: 2019-02-14 | Stop reason: HOSPADM

## 2019-02-12 RX ORDER — ATENOLOL 50 MG/1
100 TABLET ORAL NIGHTLY
Status: DISCONTINUED | OUTPATIENT
Start: 2019-02-12 | End: 2019-02-14 | Stop reason: HOSPADM

## 2019-02-12 RX ORDER — SUCCINYLCHOLINE CHLORIDE 20 MG/ML
INJECTION INTRAMUSCULAR; INTRAVENOUS PRN
Status: DISCONTINUED | OUTPATIENT
Start: 2019-02-12 | End: 2019-02-12 | Stop reason: SDUPTHER

## 2019-02-12 RX ORDER — ONDANSETRON 4 MG/1
4 TABLET, ORALLY DISINTEGRATING ORAL EVERY 8 HOURS PRN
Status: DISCONTINUED | OUTPATIENT
Start: 2019-02-12 | End: 2019-02-14 | Stop reason: HOSPADM

## 2019-02-12 RX ORDER — DEXAMETHASONE SODIUM PHOSPHATE 4 MG/ML
INJECTION, SOLUTION INTRA-ARTICULAR; INTRALESIONAL; INTRAMUSCULAR; INTRAVENOUS; SOFT TISSUE PRN
Status: DISCONTINUED | OUTPATIENT
Start: 2019-02-12 | End: 2019-02-12 | Stop reason: SDUPTHER

## 2019-02-12 RX ORDER — ACETAMINOPHEN 10 MG/ML
1000 INJECTION, SOLUTION INTRAVENOUS ONCE
Status: COMPLETED | OUTPATIENT
Start: 2019-02-12 | End: 2019-02-12

## 2019-02-12 RX ORDER — MAGNESIUM SULFATE 1 G/100ML
1 INJECTION INTRAVENOUS PRN
Status: DISCONTINUED | OUTPATIENT
Start: 2019-02-12 | End: 2019-02-14 | Stop reason: HOSPADM

## 2019-02-12 RX ORDER — HYDROMORPHONE HCL 110MG/55ML
PATIENT CONTROLLED ANALGESIA SYRINGE INTRAVENOUS PRN
Status: DISCONTINUED | OUTPATIENT
Start: 2019-02-12 | End: 2019-02-12 | Stop reason: SDUPTHER

## 2019-02-12 RX ORDER — PROPOFOL 10 MG/ML
INJECTION, EMULSION INTRAVENOUS PRN
Status: DISCONTINUED | OUTPATIENT
Start: 2019-02-12 | End: 2019-02-12 | Stop reason: SDUPTHER

## 2019-02-12 RX ORDER — HYDROMORPHONE HCL 110MG/55ML
1 PATIENT CONTROLLED ANALGESIA SYRINGE INTRAVENOUS
Status: DISCONTINUED | OUTPATIENT
Start: 2019-02-12 | End: 2019-02-12

## 2019-02-12 RX ORDER — OXYCODONE HYDROCHLORIDE AND ACETAMINOPHEN 5; 325 MG/1; MG/1
1 TABLET ORAL EVERY 4 HOURS PRN
Status: DISCONTINUED | OUTPATIENT
Start: 2019-02-12 | End: 2019-02-14 | Stop reason: HOSPADM

## 2019-02-12 RX ORDER — GLYCOPYRROLATE 1 MG/5 ML
SYRINGE (ML) INTRAVENOUS PRN
Status: DISCONTINUED | OUTPATIENT
Start: 2019-02-12 | End: 2019-02-12 | Stop reason: SDUPTHER

## 2019-02-12 RX ORDER — BISACODYL 10 MG
10 SUPPOSITORY, RECTAL RECTAL DAILY PRN
Status: DISCONTINUED | OUTPATIENT
Start: 2019-02-12 | End: 2019-02-14 | Stop reason: HOSPADM

## 2019-02-12 RX ORDER — PANTOPRAZOLE SODIUM 40 MG/10ML
40 INJECTION, POWDER, LYOPHILIZED, FOR SOLUTION INTRAVENOUS 2 TIMES DAILY
Status: DISCONTINUED | OUTPATIENT
Start: 2019-02-12 | End: 2019-02-14 | Stop reason: HOSPADM

## 2019-02-12 RX ORDER — LORAZEPAM 0.5 MG/1
0.5 TABLET ORAL NIGHTLY PRN
Status: DISCONTINUED | OUTPATIENT
Start: 2019-02-12 | End: 2019-02-14 | Stop reason: HOSPADM

## 2019-02-12 RX ORDER — HYDROMORPHONE HCL 110MG/55ML
1 PATIENT CONTROLLED ANALGESIA SYRINGE INTRAVENOUS EVERY 4 HOURS PRN
Status: DISCONTINUED | OUTPATIENT
Start: 2019-02-12 | End: 2019-02-13

## 2019-02-12 RX ORDER — SODIUM CHLORIDE, SODIUM LACTATE, POTASSIUM CHLORIDE, CALCIUM CHLORIDE 600; 310; 30; 20 MG/100ML; MG/100ML; MG/100ML; MG/100ML
INJECTION, SOLUTION INTRAVENOUS CONTINUOUS PRN
Status: DISCONTINUED | OUTPATIENT
Start: 2019-02-12 | End: 2019-02-12 | Stop reason: SDUPTHER

## 2019-02-12 RX ORDER — AMOXICILLIN 250 MG
2 CAPSULE ORAL 2 TIMES DAILY
Status: DISCONTINUED | OUTPATIENT
Start: 2019-02-12 | End: 2019-02-12 | Stop reason: CLARIF

## 2019-02-12 RX ORDER — SENNA AND DOCUSATE SODIUM 50; 8.6 MG/1; MG/1
2 TABLET, FILM COATED ORAL 2 TIMES DAILY
Status: DISCONTINUED | OUTPATIENT
Start: 2019-02-12 | End: 2019-02-14 | Stop reason: HOSPADM

## 2019-02-12 RX ORDER — CLONIDINE HYDROCHLORIDE 0.1 MG/1
0.1 TABLET ORAL 2 TIMES DAILY
Status: DISCONTINUED | OUTPATIENT
Start: 2019-02-12 | End: 2019-02-14 | Stop reason: HOSPADM

## 2019-02-12 RX ORDER — SODIUM CHLORIDE, SODIUM LACTATE, POTASSIUM CHLORIDE, CALCIUM CHLORIDE 600; 310; 30; 20 MG/100ML; MG/100ML; MG/100ML; MG/100ML
INJECTION, SOLUTION INTRAVENOUS ONCE
Status: COMPLETED | OUTPATIENT
Start: 2019-02-12 | End: 2019-02-12

## 2019-02-12 RX ORDER — METOPROLOL TARTRATE 5 MG/5ML
INJECTION INTRAVENOUS PRN
Status: DISCONTINUED | OUTPATIENT
Start: 2019-02-12 | End: 2019-02-12 | Stop reason: SDUPTHER

## 2019-02-12 RX ORDER — SODIUM CHLORIDE 0.9 % (FLUSH) 0.9 %
10 SYRINGE (ML) INJECTION PRN
Status: DISCONTINUED | OUTPATIENT
Start: 2019-02-12 | End: 2019-02-14 | Stop reason: HOSPADM

## 2019-02-12 RX ORDER — EPHEDRINE SULFATE 50 MG/ML
INJECTION, SOLUTION INTRAVENOUS PRN
Status: DISCONTINUED | OUTPATIENT
Start: 2019-02-12 | End: 2019-02-12 | Stop reason: SDUPTHER

## 2019-02-12 RX ORDER — LIDOCAINE HYDROCHLORIDE 20 MG/ML
INJECTION, SOLUTION EPIDURAL; INFILTRATION; INTRACAUDAL; PERINEURAL PRN
Status: DISCONTINUED | OUTPATIENT
Start: 2019-02-12 | End: 2019-02-12 | Stop reason: SDUPTHER

## 2019-02-12 RX ORDER — GABAPENTIN 400 MG/1
800 CAPSULE ORAL 3 TIMES DAILY
Status: DISCONTINUED | OUTPATIENT
Start: 2019-02-12 | End: 2019-02-14 | Stop reason: HOSPADM

## 2019-02-12 RX ORDER — ONDANSETRON 2 MG/ML
INJECTION INTRAMUSCULAR; INTRAVENOUS PRN
Status: DISCONTINUED | OUTPATIENT
Start: 2019-02-12 | End: 2019-02-12 | Stop reason: SDUPTHER

## 2019-02-12 RX ORDER — OXYCODONE HYDROCHLORIDE AND ACETAMINOPHEN 5; 325 MG/1; MG/1
2 TABLET ORAL EVERY 4 HOURS PRN
Status: DISCONTINUED | OUTPATIENT
Start: 2019-02-12 | End: 2019-02-14 | Stop reason: HOSPADM

## 2019-02-12 RX ORDER — ONDANSETRON 2 MG/ML
4 INJECTION INTRAMUSCULAR; INTRAVENOUS EVERY 4 HOURS
Status: DISCONTINUED | OUTPATIENT
Start: 2019-02-12 | End: 2019-02-14 | Stop reason: HOSPADM

## 2019-02-12 RX ORDER — HYDRALAZINE HYDROCHLORIDE 20 MG/ML
5 INJECTION INTRAMUSCULAR; INTRAVENOUS EVERY 5 MIN PRN
Status: COMPLETED | OUTPATIENT
Start: 2019-02-12 | End: 2019-02-12

## 2019-02-12 RX ORDER — ACETAMINOPHEN 325 MG/1
650 TABLET ORAL EVERY 4 HOURS PRN
Status: DISCONTINUED | OUTPATIENT
Start: 2019-02-12 | End: 2019-02-14 | Stop reason: HOSPADM

## 2019-02-12 RX ORDER — SODIUM CHLORIDE 0.9 % (FLUSH) 0.9 %
10 SYRINGE (ML) INJECTION EVERY 12 HOURS SCHEDULED
Status: DISCONTINUED | OUTPATIENT
Start: 2019-02-12 | End: 2019-02-14 | Stop reason: HOSPADM

## 2019-02-12 RX ORDER — FENTANYL CITRATE 50 UG/ML
25 INJECTION, SOLUTION INTRAMUSCULAR; INTRAVENOUS EVERY 5 MIN PRN
Status: DISCONTINUED | OUTPATIENT
Start: 2019-02-12 | End: 2019-02-12 | Stop reason: HOSPADM

## 2019-02-12 RX ORDER — PROMETHAZINE HYDROCHLORIDE 25 MG/ML
INJECTION, SOLUTION INTRAMUSCULAR; INTRAVENOUS
Status: DISCONTINUED
Start: 2019-02-12 | End: 2019-02-12

## 2019-02-12 RX ORDER — MIDAZOLAM HYDROCHLORIDE 1 MG/ML
INJECTION INTRAMUSCULAR; INTRAVENOUS PRN
Status: DISCONTINUED | OUTPATIENT
Start: 2019-02-12 | End: 2019-02-12 | Stop reason: SDUPTHER

## 2019-02-12 RX ORDER — FENTANYL CITRATE 50 UG/ML
INJECTION, SOLUTION INTRAMUSCULAR; INTRAVENOUS PRN
Status: DISCONTINUED | OUTPATIENT
Start: 2019-02-12 | End: 2019-02-12 | Stop reason: SDUPTHER

## 2019-02-12 RX ORDER — CEFAZOLIN SODIUM 2 G/100ML
2 INJECTION, SOLUTION INTRAVENOUS EVERY 8 HOURS
Status: COMPLETED | OUTPATIENT
Start: 2019-02-12 | End: 2019-02-13

## 2019-02-12 RX ORDER — ALBUTEROL SULFATE 90 UG/1
2 AEROSOL, METERED RESPIRATORY (INHALATION) EVERY 6 HOURS PRN
Status: DISCONTINUED | OUTPATIENT
Start: 2019-02-12 | End: 2019-02-14 | Stop reason: HOSPADM

## 2019-02-12 RX ORDER — DEXTROSE, SODIUM CHLORIDE, AND POTASSIUM CHLORIDE 5; .45; .15 G/100ML; G/100ML; G/100ML
INJECTION INTRAVENOUS CONTINUOUS
Status: DISCONTINUED | OUTPATIENT
Start: 2019-02-12 | End: 2019-02-14 | Stop reason: HOSPADM

## 2019-02-12 RX ORDER — ACETAMINOPHEN 650 MG/1
650 SUPPOSITORY RECTAL EVERY 4 HOURS PRN
Status: DISCONTINUED | OUTPATIENT
Start: 2019-02-12 | End: 2019-02-14 | Stop reason: HOSPADM

## 2019-02-12 RX ORDER — ONDANSETRON 2 MG/ML
4 INJECTION INTRAMUSCULAR; INTRAVENOUS EVERY 4 HOURS PRN
Status: DISCONTINUED | OUTPATIENT
Start: 2019-02-12 | End: 2019-02-14 | Stop reason: HOSPADM

## 2019-02-12 RX ORDER — HEPARIN SODIUM 5000 [USP'U]/ML
5000 INJECTION, SOLUTION INTRAVENOUS; SUBCUTANEOUS ONCE
Status: COMPLETED | OUTPATIENT
Start: 2019-02-12 | End: 2019-02-12

## 2019-02-12 RX ORDER — POTASSIUM CHLORIDE 7.45 MG/ML
10 INJECTION INTRAVENOUS PRN
Status: DISCONTINUED | OUTPATIENT
Start: 2019-02-12 | End: 2019-02-14 | Stop reason: HOSPADM

## 2019-02-12 RX ORDER — PROMETHAZINE HYDROCHLORIDE 25 MG/ML
12.5 INJECTION, SOLUTION INTRAMUSCULAR; INTRAVENOUS EVERY 6 HOURS PRN
Status: DISCONTINUED | OUTPATIENT
Start: 2019-02-12 | End: 2019-02-14 | Stop reason: HOSPADM

## 2019-02-12 RX ORDER — DEXAMETHASONE SODIUM PHOSPHATE 4 MG/ML
4 INJECTION, SOLUTION INTRA-ARTICULAR; INTRALESIONAL; INTRAMUSCULAR; INTRAVENOUS; SOFT TISSUE
Status: DISCONTINUED | OUTPATIENT
Start: 2019-02-12 | End: 2019-02-12 | Stop reason: HOSPADM

## 2019-02-12 RX ORDER — NEOSTIGMINE METHYLSULFATE 5 MG/5 ML
SYRINGE (ML) INTRAVENOUS PRN
Status: DISCONTINUED | OUTPATIENT
Start: 2019-02-12 | End: 2019-02-12 | Stop reason: SDUPTHER

## 2019-02-12 RX ORDER — DOCUSATE SODIUM 100 MG/1
100 CAPSULE, LIQUID FILLED ORAL 2 TIMES DAILY
Status: DISCONTINUED | OUTPATIENT
Start: 2019-02-12 | End: 2019-02-14 | Stop reason: HOSPADM

## 2019-02-12 RX ORDER — PROMETHAZINE HYDROCHLORIDE 25 MG/ML
6.25 INJECTION, SOLUTION INTRAMUSCULAR; INTRAVENOUS ONCE
Status: COMPLETED | OUTPATIENT
Start: 2019-02-12 | End: 2019-02-12

## 2019-02-12 RX ADMIN — Medication 3 MG: at 10:02

## 2019-02-12 RX ADMIN — MIDAZOLAM HYDROCHLORIDE 2 MG: 1 INJECTION, SOLUTION INTRAMUSCULAR; INTRAVENOUS at 07:36

## 2019-02-12 RX ADMIN — ONDANSETRON HYDROCHLORIDE 4 MG: 2 SOLUTION INTRAMUSCULAR; INTRAVENOUS at 09:35

## 2019-02-12 RX ADMIN — CEFAZOLIN SODIUM 2 G: 2 INJECTION, SOLUTION INTRAVENOUS at 15:52

## 2019-02-12 RX ADMIN — HYDRALAZINE HYDROCHLORIDE 5 MG: 20 INJECTION INTRAMUSCULAR; INTRAVENOUS at 10:30

## 2019-02-12 RX ADMIN — ATENOLOL 100 MG: 50 TABLET ORAL at 20:32

## 2019-02-12 RX ADMIN — SODIUM CHLORIDE, POTASSIUM CHLORIDE, SODIUM LACTATE AND CALCIUM CHLORIDE: 600; 310; 30; 20 INJECTION, SOLUTION INTRAVENOUS at 07:27

## 2019-02-12 RX ADMIN — POTASSIUM CHLORIDE, DEXTROSE MONOHYDRATE AND SODIUM CHLORIDE: 150; 5; 450 INJECTION, SOLUTION INTRAVENOUS at 11:05

## 2019-02-12 RX ADMIN — POTASSIUM CHLORIDE, DEXTROSE MONOHYDRATE AND SODIUM CHLORIDE: 150; 5; 450 INJECTION, SOLUTION INTRAVENOUS at 20:33

## 2019-02-12 RX ADMIN — LORAZEPAM 0.5 MG: 0.5 TABLET ORAL at 21:32

## 2019-02-12 RX ADMIN — HYDROMORPHONE HYDROCHLORIDE 1 MG: 2 INJECTION INTRAMUSCULAR; INTRAVENOUS; SUBCUTANEOUS at 20:30

## 2019-02-12 RX ADMIN — STANDARDIZED SENNA CONCENTRATE AND DOCUSATE SODIUM 2 TABLET: 8.6; 5 TABLET, FILM COATED ORAL at 15:30

## 2019-02-12 RX ADMIN — SODIUM CHLORIDE, POTASSIUM CHLORIDE, SODIUM LACTATE AND CALCIUM CHLORIDE: 600; 310; 30; 20 INJECTION, SOLUTION INTRAVENOUS at 07:36

## 2019-02-12 RX ADMIN — METOPROLOL TARTRATE 2 MG: 5 INJECTION, SOLUTION INTRAVENOUS at 10:17

## 2019-02-12 RX ADMIN — PROMETHAZINE HYDROCHLORIDE 6.25 MG: 25 INJECTION INTRAMUSCULAR; INTRAVENOUS at 11:03

## 2019-02-12 RX ADMIN — ROCURONIUM BROMIDE 10 MG: 50 INJECTION, SOLUTION INTRAVENOUS at 08:58

## 2019-02-12 RX ADMIN — FENTANYL CITRATE 100 MCG: 50 INJECTION INTRAMUSCULAR; INTRAVENOUS at 07:44

## 2019-02-12 RX ADMIN — ROCURONIUM BROMIDE 10 MG: 50 INJECTION, SOLUTION INTRAVENOUS at 08:14

## 2019-02-12 RX ADMIN — LIDOCAINE HYDROCHLORIDE 80 MG: 20 INJECTION, SOLUTION EPIDURAL; INFILTRATION; INTRACAUDAL; PERINEURAL at 07:44

## 2019-02-12 RX ADMIN — METOPROLOL TARTRATE 2 MG: 5 INJECTION, SOLUTION INTRAVENOUS at 09:25

## 2019-02-12 RX ADMIN — DOCUSATE SODIUM 100 MG: 100 CAPSULE, LIQUID FILLED ORAL at 20:32

## 2019-02-12 RX ADMIN — CLONIDINE HYDROCHLORIDE 0.1 MG: 0.1 TABLET ORAL at 15:30

## 2019-02-12 RX ADMIN — METOPROLOL TARTRATE 1 MG: 5 INJECTION, SOLUTION INTRAVENOUS at 10:22

## 2019-02-12 RX ADMIN — EPHEDRINE SULFATE 20 MG: 50 INJECTION, SOLUTION INTRAMUSCULAR; INTRAVENOUS; SUBCUTANEOUS at 08:03

## 2019-02-12 RX ADMIN — PROMETHAZINE HYDROCHLORIDE 12.5 MG: 25 INJECTION INTRAMUSCULAR; INTRAVENOUS at 14:07

## 2019-02-12 RX ADMIN — CLONIDINE HYDROCHLORIDE 0.1 MG: 0.1 TABLET ORAL at 20:32

## 2019-02-12 RX ADMIN — HYDROMORPHONE HYDROCHLORIDE 1 MG: 2 INJECTION INTRAMUSCULAR; INTRAVENOUS; SUBCUTANEOUS at 22:40

## 2019-02-12 RX ADMIN — ROCURONIUM BROMIDE 10 MG: 50 INJECTION, SOLUTION INTRAVENOUS at 07:44

## 2019-02-12 RX ADMIN — HYDROMORPHONE HYDROCHLORIDE 1 MG: 2 INJECTION INTRAMUSCULAR; INTRAVENOUS; SUBCUTANEOUS at 17:57

## 2019-02-12 RX ADMIN — METRONIDAZOLE 500 MG: 500 INJECTION, SOLUTION INTRAVENOUS at 08:00

## 2019-02-12 RX ADMIN — TIZANIDINE 4 MG: 4 TABLET ORAL at 20:32

## 2019-02-12 RX ADMIN — ONDANSETRON 4 MG: 2 INJECTION INTRAMUSCULAR; INTRAVENOUS at 13:43

## 2019-02-12 RX ADMIN — GABAPENTIN 800 MG: 400 CAPSULE ORAL at 21:32

## 2019-02-12 RX ADMIN — HYDROMORPHONE HYDROCHLORIDE 1 MG: 2 INJECTION INTRAMUSCULAR; INTRAVENOUS; SUBCUTANEOUS at 10:15

## 2019-02-12 RX ADMIN — SUGAMMADEX 200 MG: 100 INJECTION, SOLUTION INTRAVENOUS at 09:53

## 2019-02-12 RX ADMIN — HYDROMORPHONE HYDROCHLORIDE 1 MG: 2 INJECTION INTRAMUSCULAR; INTRAVENOUS; SUBCUTANEOUS at 09:17

## 2019-02-12 RX ADMIN — PANTOPRAZOLE SODIUM 40 MG: 40 INJECTION, POWDER, FOR SOLUTION INTRAVENOUS at 13:43

## 2019-02-12 RX ADMIN — PROPOFOL 150 MG: 10 INJECTION, EMULSION INTRAVENOUS at 07:44

## 2019-02-12 RX ADMIN — ROCURONIUM BROMIDE 40 MG: 50 INJECTION, SOLUTION INTRAVENOUS at 07:48

## 2019-02-12 RX ADMIN — LEVOTHYROXINE SODIUM 125 MCG: 0.12 TABLET ORAL at 15:30

## 2019-02-12 RX ADMIN — PROMETHAZINE HYDROCHLORIDE 12.5 MG: 25 INJECTION INTRAMUSCULAR; INTRAVENOUS at 20:24

## 2019-02-12 RX ADMIN — EPHEDRINE SULFATE 10 MG: 50 INJECTION, SOLUTION INTRAMUSCULAR; INTRAVENOUS; SUBCUTANEOUS at 08:00

## 2019-02-12 RX ADMIN — HYDRALAZINE HYDROCHLORIDE 5 MG: 20 INJECTION INTRAMUSCULAR; INTRAVENOUS at 10:50

## 2019-02-12 RX ADMIN — EPHEDRINE SULFATE 10 MG: 50 INJECTION, SOLUTION INTRAMUSCULAR; INTRAVENOUS; SUBCUTANEOUS at 08:25

## 2019-02-12 RX ADMIN — HYDRALAZINE HYDROCHLORIDE 5 MG: 20 INJECTION INTRAMUSCULAR; INTRAVENOUS at 11:00

## 2019-02-12 RX ADMIN — FENTANYL CITRATE 25 MCG: 50 INJECTION, SOLUTION INTRAMUSCULAR; INTRAVENOUS at 10:45

## 2019-02-12 RX ADMIN — DEXAMETHASONE SODIUM PHOSPHATE 8 MG: 4 INJECTION, SOLUTION INTRAMUSCULAR; INTRAVENOUS at 07:44

## 2019-02-12 RX ADMIN — Medication 0.4 MG: at 10:02

## 2019-02-12 RX ADMIN — HYDROMORPHONE HYDROCHLORIDE 1 MG: 2 INJECTION INTRAMUSCULAR; INTRAVENOUS; SUBCUTANEOUS at 13:48

## 2019-02-12 RX ADMIN — ACETAMINOPHEN 1000 MG: 10 INJECTION, SOLUTION INTRAVENOUS at 09:35

## 2019-02-12 RX ADMIN — SUCCINYLCHOLINE CHLORIDE 100 MG: 20 INJECTION, SOLUTION INTRAMUSCULAR; INTRAVENOUS at 07:44

## 2019-02-12 RX ADMIN — HEPARIN SODIUM 5000 UNITS: 5000 INJECTION, SOLUTION INTRAVENOUS; SUBCUTANEOUS at 06:55

## 2019-02-12 RX ADMIN — FENTANYL CITRATE 25 MCG: 50 INJECTION, SOLUTION INTRAMUSCULAR; INTRAVENOUS at 10:55

## 2019-02-12 RX ADMIN — PANTOPRAZOLE SODIUM 40 MG: 40 INJECTION, POWDER, FOR SOLUTION INTRAVENOUS at 20:24

## 2019-02-12 RX ADMIN — HYDROMORPHONE HYDROCHLORIDE 1 MG: 2 INJECTION INTRAMUSCULAR; INTRAVENOUS; SUBCUTANEOUS at 15:51

## 2019-02-12 RX ADMIN — HYDRALAZINE HYDROCHLORIDE 5 MG: 20 INJECTION INTRAMUSCULAR; INTRAVENOUS at 10:39

## 2019-02-12 RX ADMIN — DOCUSATE SODIUM 100 MG: 100 CAPSULE, LIQUID FILLED ORAL at 15:30

## 2019-02-12 RX ADMIN — ONDANSETRON 4 MG: 2 INJECTION INTRAMUSCULAR; INTRAVENOUS at 23:56

## 2019-02-12 RX ADMIN — DEXTROSE MONOHYDRATE 3 G: 50 INJECTION, SOLUTION INTRAVENOUS at 07:56

## 2019-02-12 RX ADMIN — STANDARDIZED SENNA CONCENTRATE AND DOCUSATE SODIUM 2 TABLET: 8.6; 5 TABLET, FILM COATED ORAL at 20:32

## 2019-02-12 RX ADMIN — PAROXETINE 30 MG: 10 TABLET, FILM COATED ORAL at 20:32

## 2019-02-12 RX ADMIN — CEFAZOLIN SODIUM 2 G: 2 INJECTION, SOLUTION INTRAVENOUS at 23:50

## 2019-02-12 ASSESSMENT — PULMONARY FUNCTION TESTS
PIF_VALUE: 32
PIF_VALUE: 2
PIF_VALUE: 32
PIF_VALUE: 2
PIF_VALUE: 33
PIF_VALUE: 33
PIF_VALUE: 28
PIF_VALUE: 25
PIF_VALUE: 26
PIF_VALUE: 34
PIF_VALUE: 8
PIF_VALUE: 32
PIF_VALUE: 31
PIF_VALUE: 32
PIF_VALUE: 28
PIF_VALUE: 30
PIF_VALUE: 32
PIF_VALUE: 26
PIF_VALUE: 32
PIF_VALUE: 1
PIF_VALUE: 28
PIF_VALUE: 32
PIF_VALUE: 33
PIF_VALUE: 32
PIF_VALUE: 33
PIF_VALUE: 30
PIF_VALUE: 27
PIF_VALUE: 33
PIF_VALUE: 33
PIF_VALUE: 28
PIF_VALUE: 31
PIF_VALUE: 28
PIF_VALUE: 5
PIF_VALUE: 29
PIF_VALUE: 33
PIF_VALUE: 32
PIF_VALUE: 32
PIF_VALUE: 11
PIF_VALUE: 32
PIF_VALUE: 30
PIF_VALUE: 10
PIF_VALUE: 26
PIF_VALUE: 30
PIF_VALUE: 33
PIF_VALUE: 31
PIF_VALUE: 32
PIF_VALUE: 33
PIF_VALUE: 11
PIF_VALUE: 30
PIF_VALUE: 33
PIF_VALUE: 33
PIF_VALUE: 32
PIF_VALUE: 31
PIF_VALUE: 33
PIF_VALUE: 30
PIF_VALUE: 26
PIF_VALUE: 2
PIF_VALUE: 32
PIF_VALUE: 39
PIF_VALUE: 1
PIF_VALUE: 33
PIF_VALUE: 28
PIF_VALUE: 32
PIF_VALUE: 30
PIF_VALUE: 6
PIF_VALUE: 33
PIF_VALUE: 33
PIF_VALUE: 32
PIF_VALUE: 33
PIF_VALUE: 28
PIF_VALUE: 33
PIF_VALUE: 33
PIF_VALUE: 10
PIF_VALUE: 25
PIF_VALUE: 32
PIF_VALUE: 11
PIF_VALUE: 32
PIF_VALUE: 31
PIF_VALUE: 29
PIF_VALUE: 11
PIF_VALUE: 33
PIF_VALUE: 33
PIF_VALUE: 32
PIF_VALUE: 0
PIF_VALUE: 32
PIF_VALUE: 32
PIF_VALUE: 33
PIF_VALUE: 31
PIF_VALUE: 26
PIF_VALUE: 32
PIF_VALUE: 2
PIF_VALUE: 33
PIF_VALUE: 32
PIF_VALUE: 32
PIF_VALUE: 33
PIF_VALUE: 31
PIF_VALUE: 34
PIF_VALUE: 33
PIF_VALUE: 31
PIF_VALUE: 33
PIF_VALUE: 0
PIF_VALUE: 32
PIF_VALUE: 33
PIF_VALUE: 0
PIF_VALUE: 0
PIF_VALUE: 31
PIF_VALUE: 5
PIF_VALUE: 0
PIF_VALUE: 31
PIF_VALUE: 5
PIF_VALUE: 6
PIF_VALUE: 28
PIF_VALUE: 31
PIF_VALUE: 33
PIF_VALUE: 30
PIF_VALUE: 33
PIF_VALUE: 0
PIF_VALUE: 4
PIF_VALUE: 26
PIF_VALUE: 31
PIF_VALUE: 32
PIF_VALUE: 25
PIF_VALUE: 26
PIF_VALUE: 33
PIF_VALUE: 5
PIF_VALUE: 10
PIF_VALUE: 33
PIF_VALUE: 28
PIF_VALUE: 33
PIF_VALUE: 33
PIF_VALUE: 28
PIF_VALUE: 32
PIF_VALUE: 33
PIF_VALUE: 31
PIF_VALUE: 33
PIF_VALUE: 32
PIF_VALUE: 33
PIF_VALUE: 25
PIF_VALUE: 33
PIF_VALUE: 32
PIF_VALUE: 31
PIF_VALUE: 29
PIF_VALUE: 3
PIF_VALUE: 31
PIF_VALUE: 32
PIF_VALUE: 32
PIF_VALUE: 11
PIF_VALUE: 6

## 2019-02-12 ASSESSMENT — PAIN SCALES - GENERAL
PAINLEVEL_OUTOF10: 8
PAINLEVEL_OUTOF10: 0
PAINLEVEL_OUTOF10: 6
PAINLEVEL_OUTOF10: 6
PAINLEVEL_OUTOF10: 8
PAINLEVEL_OUTOF10: 8
PAINLEVEL_OUTOF10: 7
PAINLEVEL_OUTOF10: 6
PAINLEVEL_OUTOF10: 8

## 2019-02-13 LAB
ANION GAP SERPL CALCULATED.3IONS-SCNC: 8 MMOL/L (ref 4–16)
BASOPHILS ABSOLUTE: 0 K/CU MM
BASOPHILS RELATIVE PERCENT: 0.2 % (ref 0–1)
BUN BLDV-MCNC: 16 MG/DL (ref 6–23)
CALCIUM SERPL-MCNC: 10.6 MG/DL (ref 8.3–10.6)
CHLORIDE BLD-SCNC: 101 MMOL/L (ref 99–110)
CO2: 25 MMOL/L (ref 21–32)
CREAT SERPL-MCNC: 0.8 MG/DL (ref 0.6–1.1)
DIFFERENTIAL TYPE: ABNORMAL
EOSINOPHILS ABSOLUTE: 0 K/CU MM
EOSINOPHILS RELATIVE PERCENT: 0.2 % (ref 0–3)
GFR AFRICAN AMERICAN: >60 ML/MIN/1.73M2
GFR NON-AFRICAN AMERICAN: >60 ML/MIN/1.73M2
GLUCOSE BLD-MCNC: 128 MG/DL (ref 70–99)
HCT VFR BLD CALC: 34.6 % (ref 37–47)
HEMOGLOBIN: 10.5 GM/DL (ref 12.5–16)
IMMATURE NEUTROPHIL %: 0.2 % (ref 0–0.43)
LYMPHOCYTES ABSOLUTE: 1.4 K/CU MM
LYMPHOCYTES RELATIVE PERCENT: 21.8 % (ref 24–44)
MCH RBC QN AUTO: 25.7 PG (ref 27–31)
MCHC RBC AUTO-ENTMCNC: 30.3 % (ref 32–36)
MCV RBC AUTO: 84.6 FL (ref 78–100)
MONOCYTES ABSOLUTE: 0.6 K/CU MM
MONOCYTES RELATIVE PERCENT: 8.8 % (ref 0–4)
NUCLEATED RBC %: 0 %
PDW BLD-RTO: 13.9 % (ref 11.7–14.9)
PLATELET # BLD: 210 K/CU MM (ref 140–440)
PMV BLD AUTO: 10.2 FL (ref 7.5–11.1)
POTASSIUM SERPL-SCNC: 4.5 MMOL/L (ref 3.5–5.1)
RBC # BLD: 4.09 M/CU MM (ref 4.2–5.4)
SEGMENTED NEUTROPHILS ABSOLUTE COUNT: 4.6 K/CU MM
SEGMENTED NEUTROPHILS RELATIVE PERCENT: 68.8 % (ref 36–66)
SODIUM BLD-SCNC: 134 MMOL/L (ref 135–145)
TOTAL IMMATURE NEUTOROPHIL: 0.01 K/CU MM
TOTAL NUCLEATED RBC: 0 K/CU MM
WBC # BLD: 6.6 K/CU MM (ref 4–10.5)

## 2019-02-13 PROCEDURE — 99024 POSTOP FOLLOW-UP VISIT: CPT | Performed by: SURGERY

## 2019-02-13 PROCEDURE — 80048 BASIC METABOLIC PNL TOTAL CA: CPT

## 2019-02-13 PROCEDURE — 85025 COMPLETE CBC W/AUTO DIFF WBC: CPT

## 2019-02-13 PROCEDURE — 6370000000 HC RX 637 (ALT 250 FOR IP): Performed by: SURGERY

## 2019-02-13 PROCEDURE — 2500000003 HC RX 250 WO HCPCS: Performed by: SURGERY

## 2019-02-13 PROCEDURE — 36591 DRAW BLOOD OFF VENOUS DEVICE: CPT

## 2019-02-13 PROCEDURE — 2580000003 HC RX 258: Performed by: SURGERY

## 2019-02-13 PROCEDURE — 94761 N-INVAS EAR/PLS OXIMETRY MLT: CPT

## 2019-02-13 PROCEDURE — C9113 INJ PANTOPRAZOLE SODIUM, VIA: HCPCS | Performed by: SURGERY

## 2019-02-13 PROCEDURE — 6360000002 HC RX W HCPCS: Performed by: STUDENT IN AN ORGANIZED HEALTH CARE EDUCATION/TRAINING PROGRAM

## 2019-02-13 PROCEDURE — 6370000000 HC RX 637 (ALT 250 FOR IP): Performed by: HOSPITALIST

## 2019-02-13 PROCEDURE — 94150 VITAL CAPACITY TEST: CPT

## 2019-02-13 PROCEDURE — 1200000000 HC SEMI PRIVATE

## 2019-02-13 PROCEDURE — 6360000002 HC RX W HCPCS: Performed by: SURGERY

## 2019-02-13 RX ORDER — HYDROMORPHONE HCL 110MG/55ML
1 PATIENT CONTROLLED ANALGESIA SYRINGE INTRAVENOUS
Status: DISCONTINUED | OUTPATIENT
Start: 2019-02-13 | End: 2019-02-14 | Stop reason: HOSPADM

## 2019-02-13 RX ORDER — AMOXICILLIN 250 MG
2 CAPSULE ORAL DAILY
Qty: 60 TABLET | Refills: 3 | Status: SHIPPED | OUTPATIENT
Start: 2019-02-13 | End: 2019-02-23

## 2019-02-13 RX ORDER — ONDANSETRON 4 MG/1
4 TABLET, ORALLY DISINTEGRATING ORAL EVERY 4 HOURS PRN
Qty: 30 TABLET | Refills: 3 | Status: SHIPPED | OUTPATIENT
Start: 2019-02-13 | End: 2019-02-22

## 2019-02-13 RX ORDER — OXYCODONE HYDROCHLORIDE AND ACETAMINOPHEN 5; 325 MG/1; MG/1
1-2 TABLET ORAL EVERY 6 HOURS PRN
Qty: 35 TABLET | Refills: 0 | Status: ON HOLD | OUTPATIENT
Start: 2019-02-13 | End: 2019-02-18 | Stop reason: SINTOL

## 2019-02-13 RX ORDER — HYDROMORPHONE HCL 110MG/55ML
1 PATIENT CONTROLLED ANALGESIA SYRINGE INTRAVENOUS
Status: DISCONTINUED | OUTPATIENT
Start: 2019-02-13 | End: 2019-02-13 | Stop reason: CLARIF

## 2019-02-13 RX ORDER — PANTOPRAZOLE SODIUM 40 MG/1
40 TABLET, DELAYED RELEASE ORAL 2 TIMES DAILY
Qty: 60 TABLET | Refills: 3 | Status: SHIPPED | OUTPATIENT
Start: 2019-02-13 | End: 2019-07-06

## 2019-02-13 RX ADMIN — GABAPENTIN 800 MG: 400 CAPSULE ORAL at 20:30

## 2019-02-13 RX ADMIN — OXYCODONE AND ACETAMINOPHEN 2 TABLET: 5; 325 TABLET ORAL at 01:33

## 2019-02-13 RX ADMIN — ONDANSETRON 4 MG: 2 INJECTION INTRAMUSCULAR; INTRAVENOUS at 04:03

## 2019-02-13 RX ADMIN — HYDROMORPHONE HYDROCHLORIDE 1 MG: 2 INJECTION INTRAMUSCULAR; INTRAVENOUS; SUBCUTANEOUS at 03:02

## 2019-02-13 RX ADMIN — POTASSIUM CHLORIDE, DEXTROSE MONOHYDRATE AND SODIUM CHLORIDE: 150; 5; 450 INJECTION, SOLUTION INTRAVENOUS at 19:58

## 2019-02-13 RX ADMIN — PANTOPRAZOLE SODIUM 40 MG: 40 INJECTION, POWDER, FOR SOLUTION INTRAVENOUS at 09:04

## 2019-02-13 RX ADMIN — ONDANSETRON 4 MG: 2 INJECTION INTRAMUSCULAR; INTRAVENOUS at 15:54

## 2019-02-13 RX ADMIN — DOCUSATE SODIUM 100 MG: 100 CAPSULE, LIQUID FILLED ORAL at 09:05

## 2019-02-13 RX ADMIN — PROMETHAZINE HYDROCHLORIDE 12.5 MG: 25 INJECTION INTRAMUSCULAR; INTRAVENOUS at 03:10

## 2019-02-13 RX ADMIN — STANDARDIZED SENNA CONCENTRATE AND DOCUSATE SODIUM 2 TABLET: 8.6; 5 TABLET, FILM COATED ORAL at 09:05

## 2019-02-13 RX ADMIN — HYDROMORPHONE HYDROCHLORIDE 1 MG: 2 INJECTION INTRAMUSCULAR; INTRAVENOUS; SUBCUTANEOUS at 06:47

## 2019-02-13 RX ADMIN — ATENOLOL 100 MG: 50 TABLET ORAL at 20:29

## 2019-02-13 RX ADMIN — STANDARDIZED SENNA CONCENTRATE AND DOCUSATE SODIUM 2 TABLET: 8.6; 5 TABLET, FILM COATED ORAL at 20:30

## 2019-02-13 RX ADMIN — HYDROMORPHONE HYDROCHLORIDE 1 MG: 2 INJECTION INTRAMUSCULAR; INTRAVENOUS; SUBCUTANEOUS at 22:31

## 2019-02-13 RX ADMIN — CEFAZOLIN SODIUM 2 G: 2 INJECTION, SOLUTION INTRAVENOUS at 09:11

## 2019-02-13 RX ADMIN — PAROXETINE 30 MG: 10 TABLET, FILM COATED ORAL at 20:29

## 2019-02-13 RX ADMIN — DOCUSATE SODIUM 100 MG: 100 CAPSULE, LIQUID FILLED ORAL at 20:30

## 2019-02-13 RX ADMIN — LEVOTHYROXINE SODIUM 125 MCG: 0.12 TABLET ORAL at 05:50

## 2019-02-13 RX ADMIN — HYDROMORPHONE HYDROCHLORIDE 1 MG: 2 INJECTION INTRAMUSCULAR; INTRAVENOUS; SUBCUTANEOUS at 10:37

## 2019-02-13 RX ADMIN — CLONIDINE HYDROCHLORIDE 0.1 MG: 0.1 TABLET ORAL at 20:30

## 2019-02-13 RX ADMIN — HYDROMORPHONE HYDROCHLORIDE 1 MG: 2 INJECTION INTRAMUSCULAR; INTRAVENOUS; SUBCUTANEOUS at 16:03

## 2019-02-13 RX ADMIN — POTASSIUM CHLORIDE, DEXTROSE MONOHYDRATE AND SODIUM CHLORIDE: 150; 5; 450 INJECTION, SOLUTION INTRAVENOUS at 03:13

## 2019-02-13 RX ADMIN — SODIUM CHLORIDE, PRESERVATIVE FREE 10 ML: 5 INJECTION INTRAVENOUS at 09:04

## 2019-02-13 RX ADMIN — GABAPENTIN 800 MG: 400 CAPSULE ORAL at 11:29

## 2019-02-13 RX ADMIN — ONDANSETRON 4 MG: 2 INJECTION INTRAMUSCULAR; INTRAVENOUS at 20:28

## 2019-02-13 RX ADMIN — HYDROMORPHONE HYDROCHLORIDE 1 MG: 2 INJECTION INTRAMUSCULAR; INTRAVENOUS; SUBCUTANEOUS at 19:56

## 2019-02-13 RX ADMIN — PROMETHAZINE HYDROCHLORIDE 12.5 MG: 25 INJECTION INTRAMUSCULAR; INTRAVENOUS at 17:35

## 2019-02-13 RX ADMIN — ONDANSETRON 4 MG: 2 INJECTION INTRAMUSCULAR; INTRAVENOUS at 13:27

## 2019-02-13 RX ADMIN — ONDANSETRON 4 MG: 2 INJECTION INTRAMUSCULAR; INTRAVENOUS at 09:05

## 2019-02-13 RX ADMIN — ENOXAPARIN SODIUM 40 MG: 40 INJECTION SUBCUTANEOUS at 09:04

## 2019-02-13 RX ADMIN — LORAZEPAM 0.5 MG: 0.5 TABLET ORAL at 20:29

## 2019-02-13 RX ADMIN — TIZANIDINE 4 MG: 4 TABLET ORAL at 20:30

## 2019-02-13 RX ADMIN — HYDROMORPHONE HYDROCHLORIDE 1 MG: 2 INJECTION INTRAMUSCULAR; INTRAVENOUS; SUBCUTANEOUS at 13:27

## 2019-02-13 RX ADMIN — POTASSIUM CHLORIDE, DEXTROSE MONOHYDRATE AND SODIUM CHLORIDE: 150; 5; 450 INJECTION, SOLUTION INTRAVENOUS at 11:30

## 2019-02-13 RX ADMIN — SODIUM CHLORIDE, PRESERVATIVE FREE 10 ML: 5 INJECTION INTRAVENOUS at 20:34

## 2019-02-13 RX ADMIN — PANTOPRAZOLE SODIUM 40 MG: 40 INJECTION, POWDER, FOR SOLUTION INTRAVENOUS at 20:28

## 2019-02-13 RX ADMIN — CLONIDINE HYDROCHLORIDE 0.1 MG: 0.1 TABLET ORAL at 09:05

## 2019-02-13 ASSESSMENT — PAIN SCALES - GENERAL
PAINLEVEL_OUTOF10: 9
PAINLEVEL_OUTOF10: 9
PAINLEVEL_OUTOF10: 8
PAINLEVEL_OUTOF10: 9
PAINLEVEL_OUTOF10: 5
PAINLEVEL_OUTOF10: 10
PAINLEVEL_OUTOF10: 8
PAINLEVEL_OUTOF10: 10
PAINLEVEL_OUTOF10: 10
PAINLEVEL_OUTOF10: 5
PAINLEVEL_OUTOF10: 10

## 2019-02-13 ASSESSMENT — PAIN DESCRIPTION - LOCATION
LOCATION: ABDOMEN
LOCATION: ABDOMEN

## 2019-02-13 ASSESSMENT — PAIN DESCRIPTION - ORIENTATION
ORIENTATION: MID
ORIENTATION: MID

## 2019-02-13 ASSESSMENT — PAIN DESCRIPTION - PAIN TYPE
TYPE: SURGICAL PAIN
TYPE: SURGICAL PAIN

## 2019-02-14 VITALS
BODY MASS INDEX: 50.02 KG/M2 | TEMPERATURE: 98.3 F | HEART RATE: 72 BPM | OXYGEN SATURATION: 98 % | HEIGHT: 64 IN | RESPIRATION RATE: 16 BRPM | SYSTOLIC BLOOD PRESSURE: 140 MMHG | WEIGHT: 293 LBS | DIASTOLIC BLOOD PRESSURE: 71 MMHG

## 2019-02-14 PROCEDURE — 2500000003 HC RX 250 WO HCPCS: Performed by: SURGERY

## 2019-02-14 PROCEDURE — 94150 VITAL CAPACITY TEST: CPT

## 2019-02-14 PROCEDURE — C9113 INJ PANTOPRAZOLE SODIUM, VIA: HCPCS | Performed by: SURGERY

## 2019-02-14 PROCEDURE — 6370000000 HC RX 637 (ALT 250 FOR IP): Performed by: SURGERY

## 2019-02-14 PROCEDURE — 6370000000 HC RX 637 (ALT 250 FOR IP): Performed by: HOSPITALIST

## 2019-02-14 PROCEDURE — 6360000002 HC RX W HCPCS: Performed by: SURGERY

## 2019-02-14 PROCEDURE — 94761 N-INVAS EAR/PLS OXIMETRY MLT: CPT

## 2019-02-14 PROCEDURE — 2580000003 HC RX 258: Performed by: SURGERY

## 2019-02-14 RX ADMIN — ONDANSETRON 4 MG: 2 INJECTION INTRAMUSCULAR; INTRAVENOUS at 00:39

## 2019-02-14 RX ADMIN — PANTOPRAZOLE SODIUM 40 MG: 40 INJECTION, POWDER, FOR SOLUTION INTRAVENOUS at 10:32

## 2019-02-14 RX ADMIN — ONDANSETRON 4 MG: 2 INJECTION INTRAMUSCULAR; INTRAVENOUS at 08:21

## 2019-02-14 RX ADMIN — PROMETHAZINE HYDROCHLORIDE 12.5 MG: 25 INJECTION INTRAMUSCULAR; INTRAVENOUS at 02:34

## 2019-02-14 RX ADMIN — HYDROMORPHONE HYDROCHLORIDE 1 MG: 2 INJECTION INTRAMUSCULAR; INTRAVENOUS; SUBCUTANEOUS at 05:54

## 2019-02-14 RX ADMIN — POTASSIUM CHLORIDE, DEXTROSE MONOHYDRATE AND SODIUM CHLORIDE: 150; 5; 450 INJECTION, SOLUTION INTRAVENOUS at 10:46

## 2019-02-14 RX ADMIN — PROMETHAZINE HYDROCHLORIDE 12.5 MG: 25 INJECTION INTRAMUSCULAR; INTRAVENOUS at 12:09

## 2019-02-14 RX ADMIN — HYDROMORPHONE HYDROCHLORIDE 1 MG: 2 INJECTION INTRAMUSCULAR; INTRAVENOUS; SUBCUTANEOUS at 10:45

## 2019-02-14 RX ADMIN — HYDROMORPHONE HYDROCHLORIDE 1 MG: 2 INJECTION INTRAMUSCULAR; INTRAVENOUS; SUBCUTANEOUS at 14:15

## 2019-02-14 RX ADMIN — POTASSIUM CHLORIDE, DEXTROSE MONOHYDRATE AND SODIUM CHLORIDE: 150; 5; 450 INJECTION, SOLUTION INTRAVENOUS at 02:43

## 2019-02-14 RX ADMIN — ONDANSETRON 4 MG: 2 INJECTION INTRAMUSCULAR; INTRAVENOUS at 14:15

## 2019-02-14 RX ADMIN — SODIUM CHLORIDE, PRESERVATIVE FREE 10 ML: 5 INJECTION INTRAVENOUS at 10:35

## 2019-02-14 RX ADMIN — GABAPENTIN 800 MG: 400 CAPSULE ORAL at 14:15

## 2019-02-14 RX ADMIN — HYDROMORPHONE HYDROCHLORIDE 1 MG: 2 INJECTION INTRAMUSCULAR; INTRAVENOUS; SUBCUTANEOUS at 08:21

## 2019-02-14 RX ADMIN — STANDARDIZED SENNA CONCENTRATE AND DOCUSATE SODIUM 2 TABLET: 8.6; 5 TABLET, FILM COATED ORAL at 10:33

## 2019-02-14 RX ADMIN — OXYCODONE AND ACETAMINOPHEN 2 TABLET: 5; 325 TABLET ORAL at 16:37

## 2019-02-14 RX ADMIN — GABAPENTIN 800 MG: 400 CAPSULE ORAL at 06:04

## 2019-02-14 RX ADMIN — LEVOTHYROXINE SODIUM 125 MCG: 0.12 TABLET ORAL at 06:04

## 2019-02-14 RX ADMIN — OXYCODONE AND ACETAMINOPHEN 2 TABLET: 5; 325 TABLET ORAL at 12:06

## 2019-02-14 RX ADMIN — HYDROMORPHONE HYDROCHLORIDE 1 MG: 2 INJECTION INTRAMUSCULAR; INTRAVENOUS; SUBCUTANEOUS at 02:34

## 2019-02-14 RX ADMIN — ENOXAPARIN SODIUM 40 MG: 40 INJECTION SUBCUTANEOUS at 10:33

## 2019-02-14 RX ADMIN — PROMETHAZINE HYDROCHLORIDE 12.5 MG: 25 INJECTION INTRAMUSCULAR; INTRAVENOUS at 18:45

## 2019-02-14 RX ADMIN — ONDANSETRON 4 MG: 2 INJECTION INTRAMUSCULAR; INTRAVENOUS at 05:53

## 2019-02-14 RX ADMIN — HYDROMORPHONE HYDROCHLORIDE 1 MG: 2 INJECTION INTRAMUSCULAR; INTRAVENOUS; SUBCUTANEOUS at 17:35

## 2019-02-14 RX ADMIN — DOCUSATE SODIUM 100 MG: 100 CAPSULE, LIQUID FILLED ORAL at 10:33

## 2019-02-14 ASSESSMENT — PAIN DESCRIPTION - LOCATION: LOCATION: ABDOMEN

## 2019-02-14 ASSESSMENT — PAIN SCALES - GENERAL
PAINLEVEL_OUTOF10: 4
PAINLEVEL_OUTOF10: 8
PAINLEVEL_OUTOF10: 8
PAINLEVEL_OUTOF10: 7
PAINLEVEL_OUTOF10: 8
PAINLEVEL_OUTOF10: 9
PAINLEVEL_OUTOF10: 8
PAINLEVEL_OUTOF10: 7
PAINLEVEL_OUTOF10: 4
PAINLEVEL_OUTOF10: 10
PAINLEVEL_OUTOF10: 5

## 2019-02-14 ASSESSMENT — PAIN DESCRIPTION - PAIN TYPE: TYPE: SURGICAL PAIN

## 2019-02-14 ASSESSMENT — PAIN DESCRIPTION - ORIENTATION: ORIENTATION: MID

## 2019-02-14 ASSESSMENT — PAIN DESCRIPTION - FREQUENCY: FREQUENCY: CONTINUOUS

## 2019-02-14 ASSESSMENT — PAIN DESCRIPTION - DESCRIPTORS: DESCRIPTORS: ACHING

## 2019-02-14 ASSESSMENT — PAIN DESCRIPTION - ONSET: ONSET: ON-GOING

## 2019-02-15 ENCOUNTER — TELEPHONE (OUTPATIENT)
Dept: BARIATRICS/WEIGHT MGMT | Age: 49
End: 2019-02-15

## 2019-02-15 DIAGNOSIS — Z98.84 S/P LAPAROSCOPIC SLEEVE GASTRECTOMY: ICD-10-CM

## 2019-02-15 DIAGNOSIS — R11.0 NAUSEA: Primary | ICD-10-CM

## 2019-02-15 RX ORDER — PROMETHAZINE HYDROCHLORIDE 25 MG/1
25 TABLET ORAL EVERY 8 HOURS PRN
Qty: 21 TABLET | Refills: 0 | Status: SHIPPED | OUTPATIENT
Start: 2019-02-15 | End: 2019-02-22

## 2019-02-17 ENCOUNTER — APPOINTMENT (OUTPATIENT)
Dept: CT IMAGING | Age: 49
End: 2019-02-17
Payer: COMMERCIAL

## 2019-02-17 ENCOUNTER — HOSPITAL ENCOUNTER (OUTPATIENT)
Age: 49
Setting detail: OBSERVATION
Discharge: HOME HEALTH | End: 2019-02-18
Attending: EMERGENCY MEDICINE | Admitting: SURGERY
Payer: COMMERCIAL

## 2019-02-17 DIAGNOSIS — R10.11 RIGHT UPPER QUADRANT ABDOMINAL PAIN: Chronic | ICD-10-CM

## 2019-02-17 DIAGNOSIS — Z98.890 POST-OPERATIVE NAUSEA AND VOMITING: ICD-10-CM

## 2019-02-17 DIAGNOSIS — G89.18 POST-OPERATIVE PAIN: Primary | ICD-10-CM

## 2019-02-17 DIAGNOSIS — R11.2 POST-OPERATIVE NAUSEA AND VOMITING: ICD-10-CM

## 2019-02-17 LAB
ALBUMIN SERPL-MCNC: 3.7 GM/DL (ref 3.4–5)
ALP BLD-CCNC: 112 IU/L (ref 40–128)
ALT SERPL-CCNC: 31 U/L (ref 10–40)
ANION GAP SERPL CALCULATED.3IONS-SCNC: 9 MMOL/L (ref 4–16)
AST SERPL-CCNC: 30 IU/L (ref 15–37)
BACTERIA: NEGATIVE /HPF
BASOPHILS ABSOLUTE: 0 K/CU MM
BASOPHILS RELATIVE PERCENT: 0.5 % (ref 0–1)
BILIRUB SERPL-MCNC: 0.3 MG/DL (ref 0–1)
BILIRUBIN URINE: NEGATIVE MG/DL
BLOOD, URINE: NEGATIVE
BUN BLDV-MCNC: 6 MG/DL (ref 6–23)
CALCIUM SERPL-MCNC: 10.5 MG/DL (ref 8.3–10.6)
CHLORIDE BLD-SCNC: 105 MMOL/L (ref 99–110)
CLARITY: CLEAR
CO2: 26 MMOL/L (ref 21–32)
COLOR: ABNORMAL
CREAT SERPL-MCNC: 0.6 MG/DL (ref 0.6–1.1)
DIFFERENTIAL TYPE: ABNORMAL
EOSINOPHILS ABSOLUTE: 0.4 K/CU MM
EOSINOPHILS RELATIVE PERCENT: 8.7 % (ref 0–3)
GFR AFRICAN AMERICAN: >60 ML/MIN/1.73M2
GFR NON-AFRICAN AMERICAN: >60 ML/MIN/1.73M2
GLUCOSE BLD-MCNC: 94 MG/DL (ref 70–99)
GLUCOSE, URINE: NEGATIVE MG/DL
HCT VFR BLD CALC: 31.9 % (ref 37–47)
HEMOGLOBIN: 10 GM/DL (ref 12.5–16)
IMMATURE NEUTROPHIL %: 0 % (ref 0–0.43)
KETONES, URINE: NEGATIVE MG/DL
LACTATE: 0.6 MMOL/L (ref 0.4–2)
LEUKOCYTE ESTERASE, URINE: ABNORMAL
LIPASE: 12 IU/L (ref 13–60)
LYMPHOCYTES ABSOLUTE: 1.5 K/CU MM
LYMPHOCYTES RELATIVE PERCENT: 34.9 % (ref 24–44)
MCH RBC QN AUTO: 26 PG (ref 27–31)
MCHC RBC AUTO-ENTMCNC: 31.3 % (ref 32–36)
MCV RBC AUTO: 83.1 FL (ref 78–100)
MONOCYTES ABSOLUTE: 0.3 K/CU MM
MONOCYTES RELATIVE PERCENT: 5.9 % (ref 0–4)
NITRITE URINE, QUANTITATIVE: NEGATIVE
NUCLEATED RBC %: 0 %
PDW BLD-RTO: 13.7 % (ref 11.7–14.9)
PH, URINE: 8 (ref 5–8)
PLATELET # BLD: 197 K/CU MM (ref 140–440)
PMV BLD AUTO: 10 FL (ref 7.5–11.1)
POTASSIUM SERPL-SCNC: 4 MMOL/L (ref 3.5–5.1)
PROTEIN UA: NEGATIVE MG/DL
RBC # BLD: 3.84 M/CU MM (ref 4.2–5.4)
RBC URINE: <1 /HPF (ref 0–6)
SEGMENTED NEUTROPHILS ABSOLUTE COUNT: 2.1 K/CU MM
SEGMENTED NEUTROPHILS RELATIVE PERCENT: 50 % (ref 36–66)
SODIUM BLD-SCNC: 140 MMOL/L (ref 135–145)
SPECIFIC GRAVITY UA: 1 (ref 1–1.03)
SQUAMOUS EPITHELIAL: 2 /HPF
TOTAL IMMATURE NEUTOROPHIL: 0 K/CU MM
TOTAL NUCLEATED RBC: 0 K/CU MM
TOTAL PROTEIN: 6.7 GM/DL (ref 6.4–8.2)
TRICHOMONAS: ABNORMAL /HPF
UROBILINOGEN, URINE: NORMAL MG/DL (ref 0.2–1)
WBC # BLD: 4.2 K/CU MM (ref 4–10.5)
WBC UA: 4 /HPF (ref 0–5)

## 2019-02-17 PROCEDURE — 99219 PR INITIAL OBSERVATION CARE/DAY 50 MINUTES: CPT | Performed by: SURGERY

## 2019-02-17 PROCEDURE — 6360000002 HC RX W HCPCS: Performed by: EMERGENCY MEDICINE

## 2019-02-17 PROCEDURE — G0378 HOSPITAL OBSERVATION PER HR: HCPCS

## 2019-02-17 PROCEDURE — 80053 COMPREHEN METABOLIC PANEL: CPT

## 2019-02-17 PROCEDURE — 96376 TX/PRO/DX INJ SAME DRUG ADON: CPT

## 2019-02-17 PROCEDURE — 83605 ASSAY OF LACTIC ACID: CPT

## 2019-02-17 PROCEDURE — 96375 TX/PRO/DX INJ NEW DRUG ADDON: CPT

## 2019-02-17 PROCEDURE — 96361 HYDRATE IV INFUSION ADD-ON: CPT

## 2019-02-17 PROCEDURE — 6360000002 HC RX W HCPCS: Performed by: SURGERY

## 2019-02-17 PROCEDURE — 81001 URINALYSIS AUTO W/SCOPE: CPT

## 2019-02-17 PROCEDURE — 6360000002 HC RX W HCPCS: Performed by: HOSPITALIST

## 2019-02-17 PROCEDURE — 2580000003 HC RX 258

## 2019-02-17 PROCEDURE — 74176 CT ABD & PELVIS W/O CONTRAST: CPT

## 2019-02-17 PROCEDURE — 99285 EMERGENCY DEPT VISIT HI MDM: CPT

## 2019-02-17 PROCEDURE — 2580000003 HC RX 258: Performed by: EMERGENCY MEDICINE

## 2019-02-17 PROCEDURE — 96372 THER/PROPH/DIAG INJ SC/IM: CPT

## 2019-02-17 PROCEDURE — 96374 THER/PROPH/DIAG INJ IV PUSH: CPT

## 2019-02-17 PROCEDURE — 85025 COMPLETE CBC W/AUTO DIFF WBC: CPT

## 2019-02-17 PROCEDURE — 83690 ASSAY OF LIPASE: CPT

## 2019-02-17 RX ORDER — PANTOPRAZOLE SODIUM 40 MG/10ML
40 INJECTION, POWDER, LYOPHILIZED, FOR SOLUTION INTRAVENOUS DAILY
Status: DISCONTINUED | OUTPATIENT
Start: 2019-02-18 | End: 2019-02-17

## 2019-02-17 RX ORDER — TIZANIDINE 4 MG/1
4 TABLET ORAL NIGHTLY
Status: DISCONTINUED | OUTPATIENT
Start: 2019-02-18 | End: 2019-02-18 | Stop reason: HOSPADM

## 2019-02-17 RX ORDER — PANTOPRAZOLE SODIUM 40 MG/10ML
40 INJECTION, POWDER, LYOPHILIZED, FOR SOLUTION INTRAVENOUS DAILY
Status: DISCONTINUED | OUTPATIENT
Start: 2019-02-18 | End: 2019-02-18 | Stop reason: HOSPADM

## 2019-02-17 RX ORDER — HYDROMORPHONE HCL 110MG/55ML
1 PATIENT CONTROLLED ANALGESIA SYRINGE INTRAVENOUS ONCE
Status: COMPLETED | OUTPATIENT
Start: 2019-02-17 | End: 2019-02-17

## 2019-02-17 RX ORDER — SODIUM CHLORIDE 9 MG/ML
INJECTION, SOLUTION INTRAVENOUS
Status: COMPLETED
Start: 2019-02-17 | End: 2019-02-17

## 2019-02-17 RX ORDER — CLONIDINE HYDROCHLORIDE 0.1 MG/1
0.1 TABLET ORAL 2 TIMES DAILY
Status: DISCONTINUED | OUTPATIENT
Start: 2019-02-18 | End: 2019-02-18 | Stop reason: HOSPADM

## 2019-02-17 RX ORDER — ATENOLOL 50 MG/1
100 TABLET ORAL NIGHTLY
Status: DISCONTINUED | OUTPATIENT
Start: 2019-02-18 | End: 2019-02-18 | Stop reason: HOSPADM

## 2019-02-17 RX ORDER — PROMETHAZINE HYDROCHLORIDE 25 MG/ML
12.5 INJECTION, SOLUTION INTRAMUSCULAR; INTRAVENOUS EVERY 6 HOURS PRN
Status: DISCONTINUED | OUTPATIENT
Start: 2019-02-17 | End: 2019-02-18 | Stop reason: HOSPADM

## 2019-02-17 RX ORDER — LORAZEPAM 0.5 MG/1
0.5 TABLET ORAL NIGHTLY PRN
Status: DISCONTINUED | OUTPATIENT
Start: 2019-02-18 | End: 2019-02-18 | Stop reason: HOSPADM

## 2019-02-17 RX ORDER — OXYCODONE HYDROCHLORIDE AND ACETAMINOPHEN 5; 325 MG/1; MG/1
1 TABLET ORAL EVERY 4 HOURS PRN
Status: DISCONTINUED | OUTPATIENT
Start: 2019-02-17 | End: 2019-02-18

## 2019-02-17 RX ORDER — LEVOTHYROXINE SODIUM 0.12 MG/1
125 TABLET ORAL NIGHTLY
Status: DISCONTINUED | OUTPATIENT
Start: 2019-02-18 | End: 2019-02-18 | Stop reason: HOSPADM

## 2019-02-17 RX ORDER — MORPHINE SULFATE 4 MG/ML
4 INJECTION, SOLUTION INTRAMUSCULAR; INTRAVENOUS EVERY 30 MIN PRN
Status: DISCONTINUED | OUTPATIENT
Start: 2019-02-17 | End: 2019-02-17

## 2019-02-17 RX ORDER — PROMETHAZINE HYDROCHLORIDE 25 MG/ML
12.5 INJECTION, SOLUTION INTRAMUSCULAR; INTRAVENOUS ONCE
Status: COMPLETED | OUTPATIENT
Start: 2019-02-17 | End: 2019-02-17

## 2019-02-17 RX ORDER — ACETAMINOPHEN 650 MG/1
650 SUPPOSITORY RECTAL EVERY 4 HOURS PRN
Status: DISCONTINUED | OUTPATIENT
Start: 2019-02-17 | End: 2019-02-18 | Stop reason: HOSPADM

## 2019-02-17 RX ORDER — 0.9 % SODIUM CHLORIDE 0.9 %
1000 INTRAVENOUS SOLUTION INTRAVENOUS ONCE
Status: COMPLETED | OUTPATIENT
Start: 2019-02-17 | End: 2019-02-17

## 2019-02-17 RX ORDER — ACETAMINOPHEN 650 MG/1
650 SUPPOSITORY RECTAL EVERY 4 HOURS PRN
Status: DISCONTINUED | OUTPATIENT
Start: 2019-02-17 | End: 2019-02-17

## 2019-02-17 RX ORDER — ONDANSETRON 2 MG/ML
4 INJECTION INTRAMUSCULAR; INTRAVENOUS EVERY 30 MIN PRN
Status: DISCONTINUED | OUTPATIENT
Start: 2019-02-17 | End: 2019-02-17

## 2019-02-17 RX ORDER — GABAPENTIN 400 MG/1
800 CAPSULE ORAL 3 TIMES DAILY
Status: DISCONTINUED | OUTPATIENT
Start: 2019-02-18 | End: 2019-02-18 | Stop reason: HOSPADM

## 2019-02-17 RX ORDER — PROMETHAZINE HYDROCHLORIDE 25 MG/ML
25 INJECTION, SOLUTION INTRAMUSCULAR; INTRAVENOUS ONCE
Status: COMPLETED | OUTPATIENT
Start: 2019-02-17 | End: 2019-02-17

## 2019-02-17 RX ORDER — ONDANSETRON 2 MG/ML
4 INJECTION INTRAMUSCULAR; INTRAVENOUS EVERY 6 HOURS PRN
Status: DISCONTINUED | OUTPATIENT
Start: 2019-02-17 | End: 2019-02-18 | Stop reason: HOSPADM

## 2019-02-17 RX ORDER — SODIUM CHLORIDE 9 MG/ML
INJECTION, SOLUTION INTRAVENOUS CONTINUOUS
Status: DISCONTINUED | OUTPATIENT
Start: 2019-02-17 | End: 2019-02-18 | Stop reason: HOSPADM

## 2019-02-17 RX ADMIN — HYDROMORPHONE HYDROCHLORIDE 0.5 MG: 1 INJECTION, SOLUTION INTRAMUSCULAR; INTRAVENOUS; SUBCUTANEOUS at 21:36

## 2019-02-17 RX ADMIN — ONDANSETRON 4 MG: 2 INJECTION INTRAMUSCULAR; INTRAVENOUS at 14:54

## 2019-02-17 RX ADMIN — PROMETHAZINE HYDROCHLORIDE 12.5 MG: 25 INJECTION INTRAMUSCULAR; INTRAVENOUS at 18:36

## 2019-02-17 RX ADMIN — PROMETHAZINE HYDROCHLORIDE 12.5 MG: 25 INJECTION INTRAMUSCULAR; INTRAVENOUS at 21:42

## 2019-02-17 RX ADMIN — MORPHINE SULFATE 4 MG: 4 INJECTION, SOLUTION INTRAMUSCULAR; INTRAVENOUS at 15:47

## 2019-02-17 RX ADMIN — PROMETHAZINE HYDROCHLORIDE 25 MG: 25 INJECTION INTRAMUSCULAR; INTRAVENOUS at 15:47

## 2019-02-17 RX ADMIN — HYDROMORPHONE HYDROCHLORIDE 1 MG: 2 INJECTION, SOLUTION INTRAMUSCULAR; INTRAVENOUS; SUBCUTANEOUS at 18:35

## 2019-02-17 RX ADMIN — SODIUM CHLORIDE 1000 ML: 900 INJECTION INTRAVENOUS at 14:08

## 2019-02-17 RX ADMIN — MORPHINE SULFATE 4 MG: 4 INJECTION, SOLUTION INTRAMUSCULAR; INTRAVENOUS at 14:09

## 2019-02-17 RX ADMIN — SODIUM CHLORIDE: 9 INJECTION, SOLUTION INTRAVENOUS at 19:49

## 2019-02-17 RX ADMIN — MORPHINE SULFATE 4 MG: 4 INJECTION, SOLUTION INTRAMUSCULAR; INTRAVENOUS at 14:54

## 2019-02-17 RX ADMIN — ONDANSETRON 4 MG: 2 INJECTION INTRAMUSCULAR; INTRAVENOUS at 14:09

## 2019-02-17 ASSESSMENT — PAIN DESCRIPTION - DESCRIPTORS
DESCRIPTORS: SHARP;SHOOTING
DESCRIPTORS: SHARP;DULL

## 2019-02-17 ASSESSMENT — ENCOUNTER SYMPTOMS
ALLERGIC/IMMUNOLOGIC NEGATIVE: 1
EYES NEGATIVE: 1
RESPIRATORY NEGATIVE: 1
ABDOMINAL PAIN: 1
NAUSEA: 1

## 2019-02-17 ASSESSMENT — PAIN DESCRIPTION - LOCATION
LOCATION: ABDOMEN

## 2019-02-17 ASSESSMENT — PAIN DESCRIPTION - PAIN TYPE
TYPE: ACUTE PAIN

## 2019-02-17 ASSESSMENT — PAIN DESCRIPTION - ONSET
ONSET: ON-GOING
ONSET: ON-GOING

## 2019-02-17 ASSESSMENT — PAIN SCALES - GENERAL
PAINLEVEL_OUTOF10: 10
PAINLEVEL_OUTOF10: 9
PAINLEVEL_OUTOF10: 7
PAINLEVEL_OUTOF10: 8
PAINLEVEL_OUTOF10: 10
PAINLEVEL_OUTOF10: 8
PAINLEVEL_OUTOF10: 7
PAINLEVEL_OUTOF10: 9
PAINLEVEL_OUTOF10: 9
PAINLEVEL_OUTOF10: 10
PAINLEVEL_OUTOF10: 10

## 2019-02-17 ASSESSMENT — PAIN DESCRIPTION - FREQUENCY
FREQUENCY: CONTINUOUS
FREQUENCY: CONTINUOUS

## 2019-02-17 ASSESSMENT — PAIN DESCRIPTION - PROGRESSION
CLINICAL_PROGRESSION: GRADUALLY WORSENING
CLINICAL_PROGRESSION: GRADUALLY IMPROVING

## 2019-02-17 ASSESSMENT — PAIN DESCRIPTION - ORIENTATION
ORIENTATION: RIGHT;UPPER
ORIENTATION: MID;UPPER

## 2019-02-18 VITALS
WEIGHT: 293 LBS | HEART RATE: 50 BPM | DIASTOLIC BLOOD PRESSURE: 76 MMHG | SYSTOLIC BLOOD PRESSURE: 119 MMHG | TEMPERATURE: 97.6 F | OXYGEN SATURATION: 95 % | RESPIRATION RATE: 17 BRPM | HEIGHT: 65 IN | BODY MASS INDEX: 48.82 KG/M2

## 2019-02-18 PROCEDURE — 6370000000 HC RX 637 (ALT 250 FOR IP): Performed by: HOSPITALIST

## 2019-02-18 PROCEDURE — G0378 HOSPITAL OBSERVATION PER HR: HCPCS

## 2019-02-18 PROCEDURE — 6360000002 HC RX W HCPCS: Performed by: HOSPITALIST

## 2019-02-18 PROCEDURE — 96375 TX/PRO/DX INJ NEW DRUG ADDON: CPT

## 2019-02-18 PROCEDURE — 96372 THER/PROPH/DIAG INJ SC/IM: CPT

## 2019-02-18 PROCEDURE — 6360000002 HC RX W HCPCS: Performed by: SURGERY

## 2019-02-18 PROCEDURE — 6370000000 HC RX 637 (ALT 250 FOR IP): Performed by: SURGERY

## 2019-02-18 PROCEDURE — C9113 INJ PANTOPRAZOLE SODIUM, VIA: HCPCS | Performed by: HOSPITALIST

## 2019-02-18 PROCEDURE — 96376 TX/PRO/DX INJ SAME DRUG ADON: CPT

## 2019-02-18 PROCEDURE — 2580000003 HC RX 258: Performed by: SURGERY

## 2019-02-18 PROCEDURE — 99024 POSTOP FOLLOW-UP VISIT: CPT | Performed by: SURGERY

## 2019-02-18 RX ORDER — LIDOCAINE 40 MG/G
CREAM TOPICAL PRN
Status: DISCONTINUED | OUTPATIENT
Start: 2019-02-18 | End: 2019-02-18 | Stop reason: HOSPADM

## 2019-02-18 RX ORDER — TEMAZEPAM 30 MG/1
30 CAPSULE ORAL NIGHTLY
Qty: 30 CAPSULE | Refills: 0 | Status: SHIPPED | OUTPATIENT
Start: 2019-02-18 | End: 2019-03-20

## 2019-02-18 RX ORDER — ACETAMINOPHEN 160 MG/5ML
650 SOLUTION ORAL EVERY 4 HOURS PRN
Status: DISCONTINUED | OUTPATIENT
Start: 2019-02-18 | End: 2019-02-18 | Stop reason: HOSPADM

## 2019-02-18 RX ORDER — MORPHINE SULFATE 4 MG/ML
2 INJECTION, SOLUTION INTRAMUSCULAR; INTRAVENOUS
Status: DISCONTINUED | OUTPATIENT
Start: 2019-02-18 | End: 2019-02-18

## 2019-02-18 RX ADMIN — OXYCODONE AND ACETAMINOPHEN 1 TABLET: 5; 325 TABLET ORAL at 12:38

## 2019-02-18 RX ADMIN — PANTOPRAZOLE SODIUM 40 MG: 40 INJECTION, POWDER, FOR SOLUTION INTRAVENOUS at 12:37

## 2019-02-18 RX ADMIN — PROMETHAZINE HYDROCHLORIDE 12.5 MG: 25 INJECTION INTRAMUSCULAR; INTRAVENOUS at 12:37

## 2019-02-18 RX ADMIN — SODIUM CHLORIDE: 9 INJECTION, SOLUTION INTRAVENOUS at 12:37

## 2019-02-18 RX ADMIN — CLONIDINE HYDROCHLORIDE 0.1 MG: 0.1 TABLET ORAL at 00:25

## 2019-02-18 RX ADMIN — ACETAMINOPHEN 650 MG: 160 SOLUTION ORAL at 16:35

## 2019-02-18 RX ADMIN — HYDROMORPHONE HYDROCHLORIDE 0.5 MG: 1 INJECTION, SOLUTION INTRAMUSCULAR; INTRAVENOUS; SUBCUTANEOUS at 04:46

## 2019-02-18 RX ADMIN — HYDROMORPHONE HYDROCHLORIDE 0.5 MG: 1 INJECTION, SOLUTION INTRAMUSCULAR; INTRAVENOUS; SUBCUTANEOUS at 08:46

## 2019-02-18 RX ADMIN — ONDANSETRON 4 MG: 2 INJECTION INTRAMUSCULAR; INTRAVENOUS at 00:35

## 2019-02-18 RX ADMIN — CLONIDINE HYDROCHLORIDE 0.1 MG: 0.1 TABLET ORAL at 09:22

## 2019-02-18 RX ADMIN — TIZANIDINE 4 MG: 4 TABLET ORAL at 00:31

## 2019-02-18 RX ADMIN — ENOXAPARIN SODIUM 40 MG: 40 INJECTION SUBCUTANEOUS at 09:24

## 2019-02-18 RX ADMIN — GABAPENTIN 800 MG: 400 CAPSULE ORAL at 09:24

## 2019-02-18 RX ADMIN — SODIUM CHLORIDE: 9 INJECTION, SOLUTION INTRAVENOUS at 00:26

## 2019-02-18 RX ADMIN — ONDANSETRON 4 MG: 2 INJECTION INTRAMUSCULAR; INTRAVENOUS at 08:46

## 2019-02-18 RX ADMIN — PROMETHAZINE HYDROCHLORIDE 12.5 MG: 25 INJECTION INTRAMUSCULAR; INTRAVENOUS at 04:46

## 2019-02-18 RX ADMIN — ATENOLOL 100 MG: 50 TABLET ORAL at 00:25

## 2019-02-18 RX ADMIN — GABAPENTIN 800 MG: 400 CAPSULE ORAL at 12:37

## 2019-02-18 RX ADMIN — LIDOCAINE: 40 CREAM TOPICAL at 16:35

## 2019-02-18 RX ADMIN — HYDROMORPHONE HYDROCHLORIDE 0.5 MG: 1 INJECTION, SOLUTION INTRAMUSCULAR; INTRAVENOUS; SUBCUTANEOUS at 00:40

## 2019-02-18 ASSESSMENT — PAIN SCALES - GENERAL
PAINLEVEL_OUTOF10: 9
PAINLEVEL_OUTOF10: 8
PAINLEVEL_OUTOF10: 9
PAINLEVEL_OUTOF10: 7
PAINLEVEL_OUTOF10: 9
PAINLEVEL_OUTOF10: 7
PAINLEVEL_OUTOF10: 9

## 2019-02-18 ASSESSMENT — PAIN DESCRIPTION - ORIENTATION: ORIENTATION: MID;UPPER

## 2019-02-18 ASSESSMENT — PAIN DESCRIPTION - LOCATION: LOCATION: ABDOMEN

## 2019-02-18 ASSESSMENT — PAIN DESCRIPTION - PAIN TYPE: TYPE: ACUTE PAIN

## 2019-02-18 ASSESSMENT — PAIN DESCRIPTION - FREQUENCY: FREQUENCY: CONTINUOUS

## 2019-02-18 ASSESSMENT — PAIN DESCRIPTION - DESCRIPTORS: DESCRIPTORS: SHARP;SHOOTING

## 2019-02-18 ASSESSMENT — PAIN DESCRIPTION - ONSET: ONSET: ON-GOING

## 2019-02-18 ASSESSMENT — PAIN DESCRIPTION - PROGRESSION: CLINICAL_PROGRESSION: GRADUALLY WORSENING

## 2019-02-19 ENCOUNTER — TELEPHONE (OUTPATIENT)
Dept: BARIATRICS/WEIGHT MGMT | Age: 49
End: 2019-02-19

## 2019-02-22 ENCOUNTER — OFFICE VISIT (OUTPATIENT)
Dept: BARIATRICS/WEIGHT MGMT | Age: 49
End: 2019-02-22

## 2019-02-22 VITALS
WEIGHT: 293 LBS | OXYGEN SATURATION: 95 % | HEIGHT: 65 IN | DIASTOLIC BLOOD PRESSURE: 70 MMHG | SYSTOLIC BLOOD PRESSURE: 108 MMHG | RESPIRATION RATE: 16 BRPM | BODY MASS INDEX: 48.82 KG/M2 | HEART RATE: 66 BPM

## 2019-02-22 DIAGNOSIS — Z98.84 STATUS POST LAPAROSCOPIC SLEEVE GASTRECTOMY: ICD-10-CM

## 2019-02-22 DIAGNOSIS — Z98.84 STATUS POST BARIATRIC SURGERY: Primary | ICD-10-CM

## 2019-02-22 PROCEDURE — 99024 POSTOP FOLLOW-UP VISIT: CPT | Performed by: SURGERY

## 2019-02-26 ENCOUNTER — TELEPHONE (OUTPATIENT)
Dept: BARIATRICS/WEIGHT MGMT | Age: 49
End: 2019-02-26

## 2019-02-27 ENCOUNTER — TELEPHONE (OUTPATIENT)
Dept: BARIATRICS/WEIGHT MGMT | Age: 49
End: 2019-02-27

## 2019-03-05 ENCOUNTER — TELEPHONE (OUTPATIENT)
Dept: BARIATRICS/WEIGHT MGMT | Age: 49
End: 2019-03-05

## 2019-03-08 ENCOUNTER — OFFICE VISIT (OUTPATIENT)
Dept: BARIATRICS/WEIGHT MGMT | Age: 49
End: 2019-03-08

## 2019-03-08 VITALS
HEART RATE: 56 BPM | WEIGHT: 293 LBS | RESPIRATION RATE: 17 BRPM | SYSTOLIC BLOOD PRESSURE: 130 MMHG | HEIGHT: 64 IN | BODY MASS INDEX: 50.02 KG/M2 | DIASTOLIC BLOOD PRESSURE: 74 MMHG

## 2019-03-08 DIAGNOSIS — Z98.84 STATUS POST LAPAROSCOPIC SLEEVE GASTRECTOMY: ICD-10-CM

## 2019-03-08 DIAGNOSIS — Z98.84 STATUS POST BARIATRIC SURGERY: Primary | ICD-10-CM

## 2019-03-08 DIAGNOSIS — E66.01 MORBID OBESITY WITH BMI OF 50.0-59.9, ADULT (HCC): ICD-10-CM

## 2019-03-08 DIAGNOSIS — N28.9 KIDNEY LESION: ICD-10-CM

## 2019-03-08 PROCEDURE — 99024 POSTOP FOLLOW-UP VISIT: CPT | Performed by: NURSE PRACTITIONER

## 2019-03-08 ASSESSMENT — ENCOUNTER SYMPTOMS
RHINORRHEA: 0
CHEST TIGHTNESS: 0
TROUBLE SWALLOWING: 0
ABDOMINAL PAIN: 0
BACK PAIN: 1
DIARRHEA: 0
ABDOMINAL DISTENTION: 0
EYE PAIN: 0
WHEEZING: 0
SHORTNESS OF BREATH: 0
NAUSEA: 0

## 2019-03-10 ENCOUNTER — APPOINTMENT (OUTPATIENT)
Dept: CT IMAGING | Age: 49
DRG: 920 | End: 2019-03-10
Payer: COMMERCIAL

## 2019-03-10 ENCOUNTER — HOSPITAL ENCOUNTER (INPATIENT)
Age: 49
LOS: 2 days | Discharge: HOME | DRG: 920 | End: 2019-03-12
Attending: EMERGENCY MEDICINE | Admitting: FAMILY MEDICINE
Payer: COMMERCIAL

## 2019-03-10 DIAGNOSIS — R19.7 DIARRHEA, UNSPECIFIED TYPE: ICD-10-CM

## 2019-03-10 DIAGNOSIS — K92.0 HEMATEMESIS WITH NAUSEA: Primary | ICD-10-CM

## 2019-03-10 DIAGNOSIS — L02.211 ABSCESS OF ABDOMINAL WALL: ICD-10-CM

## 2019-03-10 DIAGNOSIS — R10.9 ABDOMINAL PAIN, UNSPECIFIED ABDOMINAL LOCATION: ICD-10-CM

## 2019-03-10 PROBLEM — L02.91 ABSCESS: Status: ACTIVE | Noted: 2019-03-10

## 2019-03-10 LAB
ALBUMIN SERPL-MCNC: 4 GM/DL (ref 3.4–5)
ALP BLD-CCNC: 119 IU/L (ref 40–129)
ALT SERPL-CCNC: 54 U/L (ref 10–40)
ANION GAP SERPL CALCULATED.3IONS-SCNC: 8 MMOL/L (ref 4–16)
APTT: 28.6 SECONDS (ref 21.2–33)
AST SERPL-CCNC: 41 IU/L (ref 15–37)
BACTERIA: NEGATIVE /HPF
BASOPHILS ABSOLUTE: 0 K/CU MM
BASOPHILS RELATIVE PERCENT: 0.7 % (ref 0–1)
BILIRUB SERPL-MCNC: 0.3 MG/DL (ref 0–1)
BILIRUBIN URINE: NEGATIVE MG/DL
BLOOD, URINE: NEGATIVE
BUN BLDV-MCNC: 9 MG/DL (ref 6–23)
CALCIUM SERPL-MCNC: 10 MG/DL (ref 8.3–10.6)
CHLORIDE BLD-SCNC: 108 MMOL/L (ref 99–110)
CLARITY: ABNORMAL
CO2: 25 MMOL/L (ref 21–32)
COLOR: YELLOW
CREAT SERPL-MCNC: 0.7 MG/DL (ref 0.6–1.1)
DIFFERENTIAL TYPE: ABNORMAL
EOSINOPHILS ABSOLUTE: 0.4 K/CU MM
EOSINOPHILS RELATIVE PERCENT: 8.6 % (ref 0–3)
GFR AFRICAN AMERICAN: >60 ML/MIN/1.73M2
GFR NON-AFRICAN AMERICAN: >60 ML/MIN/1.73M2
GLUCOSE BLD-MCNC: 102 MG/DL (ref 70–99)
GLUCOSE, URINE: NEGATIVE MG/DL
HCT VFR BLD CALC: 34.2 % (ref 37–47)
HEMOGLOBIN: 10.5 GM/DL (ref 12.5–16)
HEMOGLOBIN: 10.5 GM/DL (ref 12.5–16)
HYALINE CASTS: 0 /LPF
IMMATURE NEUTROPHIL %: 0.2 % (ref 0–0.43)
INR BLD: 0.96 INDEX
KETONES, URINE: NEGATIVE MG/DL
LEUKOCYTE ESTERASE, URINE: NEGATIVE
LIPASE: 22 IU/L (ref 13–60)
LYMPHOCYTES ABSOLUTE: 2.1 K/CU MM
LYMPHOCYTES RELATIVE PERCENT: 49.6 % (ref 24–44)
MCH RBC QN AUTO: 26.1 PG (ref 27–31)
MCHC RBC AUTO-ENTMCNC: 30.7 % (ref 32–36)
MCV RBC AUTO: 84.9 FL (ref 78–100)
MONOCYTES ABSOLUTE: 0.3 K/CU MM
MONOCYTES RELATIVE PERCENT: 6.4 % (ref 0–4)
MUCUS: ABNORMAL HPF
NITRITE URINE, QUANTITATIVE: NEGATIVE
NUCLEATED RBC %: 0 %
PDW BLD-RTO: 14.4 % (ref 11.7–14.9)
PH, URINE: 5 (ref 5–8)
PLATELET # BLD: 186 K/CU MM (ref 140–440)
PMV BLD AUTO: 11.1 FL (ref 7.5–11.1)
POTASSIUM SERPL-SCNC: 3.8 MMOL/L (ref 3.5–5.1)
PROTEIN UA: NEGATIVE MG/DL
PROTHROMBIN TIME: 11 SECONDS (ref 9.12–12.5)
RBC # BLD: 4.03 M/CU MM (ref 4.2–5.4)
RBC URINE: 1 /HPF (ref 0–6)
SEGMENTED NEUTROPHILS ABSOLUTE COUNT: 1.5 K/CU MM
SEGMENTED NEUTROPHILS RELATIVE PERCENT: 34.5 % (ref 36–66)
SODIUM BLD-SCNC: 141 MMOL/L (ref 135–145)
SPECIFIC GRAVITY UA: 1.01 (ref 1–1.03)
SQUAMOUS EPITHELIAL: 32 /HPF
TOTAL IMMATURE NEUTOROPHIL: 0.01 K/CU MM
TOTAL NUCLEATED RBC: 0 K/CU MM
TOTAL PROTEIN: 6.7 GM/DL (ref 6.4–8.2)
TRICHOMONAS: ABNORMAL /HPF
UROBILINOGEN, URINE: NORMAL MG/DL (ref 0.2–1)
WBC # BLD: 4.2 K/CU MM (ref 4–10.5)
WBC UA: <1 /HPF (ref 0–5)

## 2019-03-10 PROCEDURE — 87040 BLOOD CULTURE FOR BACTERIA: CPT

## 2019-03-10 PROCEDURE — 96374 THER/PROPH/DIAG INJ IV PUSH: CPT

## 2019-03-10 PROCEDURE — 2580000003 HC RX 258: Performed by: PHYSICIAN ASSISTANT

## 2019-03-10 PROCEDURE — 36415 COLL VENOUS BLD VENIPUNCTURE: CPT

## 2019-03-10 PROCEDURE — 6360000002 HC RX W HCPCS: Performed by: FAMILY MEDICINE

## 2019-03-10 PROCEDURE — 2500000003 HC RX 250 WO HCPCS: Performed by: FAMILY MEDICINE

## 2019-03-10 PROCEDURE — 86905 BLOOD TYPING RBC ANTIGENS: CPT

## 2019-03-10 PROCEDURE — 6360000002 HC RX W HCPCS: Performed by: PHYSICIAN ASSISTANT

## 2019-03-10 PROCEDURE — 94761 N-INVAS EAR/PLS OXIMETRY MLT: CPT

## 2019-03-10 PROCEDURE — 96376 TX/PRO/DX INJ SAME DRUG ADON: CPT

## 2019-03-10 PROCEDURE — 99222 1ST HOSP IP/OBS MODERATE 55: CPT | Performed by: SURGERY

## 2019-03-10 PROCEDURE — 6370000000 HC RX 637 (ALT 250 FOR IP): Performed by: HOSPITALIST

## 2019-03-10 PROCEDURE — 6370000000 HC RX 637 (ALT 250 FOR IP): Performed by: FAMILY MEDICINE

## 2019-03-10 PROCEDURE — 1200000000 HC SEMI PRIVATE

## 2019-03-10 PROCEDURE — 86870 RBC ANTIBODY IDENTIFICATION: CPT

## 2019-03-10 PROCEDURE — 86900 BLOOD TYPING SEROLOGIC ABO: CPT

## 2019-03-10 PROCEDURE — 85730 THROMBOPLASTIN TIME PARTIAL: CPT

## 2019-03-10 PROCEDURE — C9113 INJ PANTOPRAZOLE SODIUM, VIA: HCPCS | Performed by: PHYSICIAN ASSISTANT

## 2019-03-10 PROCEDURE — 96375 TX/PRO/DX INJ NEW DRUG ADDON: CPT

## 2019-03-10 PROCEDURE — 86922 COMPATIBILITY TEST ANTIGLOB: CPT

## 2019-03-10 PROCEDURE — 85025 COMPLETE CBC W/AUTO DIFF WBC: CPT

## 2019-03-10 PROCEDURE — 85610 PROTHROMBIN TIME: CPT

## 2019-03-10 PROCEDURE — 6360000002 HC RX W HCPCS: Performed by: EMERGENCY MEDICINE

## 2019-03-10 PROCEDURE — 6360000004 HC RX CONTRAST MEDICATION: Performed by: PHYSICIAN ASSISTANT

## 2019-03-10 PROCEDURE — 85018 HEMOGLOBIN: CPT

## 2019-03-10 PROCEDURE — 99285 EMERGENCY DEPT VISIT HI MDM: CPT

## 2019-03-10 PROCEDURE — 86850 RBC ANTIBODY SCREEN: CPT

## 2019-03-10 PROCEDURE — 74177 CT ABD & PELVIS W/CONTRAST: CPT

## 2019-03-10 PROCEDURE — 2580000003 HC RX 258

## 2019-03-10 PROCEDURE — 83690 ASSAY OF LIPASE: CPT

## 2019-03-10 PROCEDURE — 86901 BLOOD TYPING SEROLOGIC RH(D): CPT

## 2019-03-10 PROCEDURE — 80053 COMPREHEN METABOLIC PANEL: CPT

## 2019-03-10 PROCEDURE — 2580000003 HC RX 258: Performed by: FAMILY MEDICINE

## 2019-03-10 PROCEDURE — 81001 URINALYSIS AUTO W/SCOPE: CPT

## 2019-03-10 RX ORDER — 0.9 % SODIUM CHLORIDE 0.9 %
10 VIAL (ML) INJECTION
Status: COMPLETED | OUTPATIENT
Start: 2019-03-10 | End: 2019-03-10

## 2019-03-10 RX ORDER — PANTOPRAZOLE SODIUM 40 MG/10ML
80 INJECTION, POWDER, LYOPHILIZED, FOR SOLUTION INTRAVENOUS ONCE
Status: COMPLETED | OUTPATIENT
Start: 2019-03-10 | End: 2019-03-10

## 2019-03-10 RX ORDER — LEVOTHYROXINE SODIUM ANHYDROUS 100 UG/5ML
62.5 INJECTION, POWDER, LYOPHILIZED, FOR SOLUTION INTRAVENOUS DAILY
Status: DISCONTINUED | OUTPATIENT
Start: 2019-03-10 | End: 2019-03-12 | Stop reason: HOSPADM

## 2019-03-10 RX ORDER — 0.9 % SODIUM CHLORIDE 0.9 %
1000 INTRAVENOUS SOLUTION INTRAVENOUS ONCE
Status: COMPLETED | OUTPATIENT
Start: 2019-03-10 | End: 2019-03-10

## 2019-03-10 RX ORDER — MORPHINE SULFATE 4 MG/ML
2 INJECTION, SOLUTION INTRAMUSCULAR; INTRAVENOUS
Status: DISCONTINUED | OUTPATIENT
Start: 2019-03-10 | End: 2019-03-12

## 2019-03-10 RX ORDER — PROMETHAZINE HYDROCHLORIDE 25 MG/ML
25 INJECTION, SOLUTION INTRAMUSCULAR; INTRAVENOUS ONCE
Status: COMPLETED | OUTPATIENT
Start: 2019-03-10 | End: 2019-03-10

## 2019-03-10 RX ORDER — SODIUM CHLORIDE 0.9 % (FLUSH) 0.9 %
10 SYRINGE (ML) INJECTION PRN
Status: DISCONTINUED | OUTPATIENT
Start: 2019-03-10 | End: 2019-03-12 | Stop reason: HOSPADM

## 2019-03-10 RX ORDER — MORPHINE SULFATE 4 MG/ML
4 INJECTION, SOLUTION INTRAMUSCULAR; INTRAVENOUS ONCE
Status: COMPLETED | OUTPATIENT
Start: 2019-03-10 | End: 2019-03-10

## 2019-03-10 RX ORDER — ACETAMINOPHEN 325 MG/1
650 TABLET ORAL EVERY 4 HOURS PRN
Status: DISCONTINUED | OUTPATIENT
Start: 2019-03-10 | End: 2019-03-12 | Stop reason: HOSPADM

## 2019-03-10 RX ORDER — MORPHINE SULFATE 4 MG/ML
4 INJECTION, SOLUTION INTRAMUSCULAR; INTRAVENOUS
Status: DISCONTINUED | OUTPATIENT
Start: 2019-03-10 | End: 2019-03-12

## 2019-03-10 RX ORDER — ALBUTEROL SULFATE 90 UG/1
2 AEROSOL, METERED RESPIRATORY (INHALATION) EVERY 6 HOURS PRN
Status: DISCONTINUED | OUTPATIENT
Start: 2019-03-10 | End: 2019-03-12 | Stop reason: HOSPADM

## 2019-03-10 RX ORDER — SODIUM CHLORIDE 9 MG/ML
INJECTION, SOLUTION INTRAVENOUS CONTINUOUS
Status: DISCONTINUED | OUTPATIENT
Start: 2019-03-10 | End: 2019-03-12 | Stop reason: HOSPADM

## 2019-03-10 RX ORDER — SODIUM CHLORIDE 0.9 % (FLUSH) 0.9 %
10 SYRINGE (ML) INJECTION EVERY 12 HOURS SCHEDULED
Status: DISCONTINUED | OUTPATIENT
Start: 2019-03-10 | End: 2019-03-12 | Stop reason: HOSPADM

## 2019-03-10 RX ORDER — SODIUM CHLORIDE 9 MG/ML
INJECTION, SOLUTION INTRAVENOUS
Status: COMPLETED
Start: 2019-03-10 | End: 2019-03-10

## 2019-03-10 RX ORDER — ONDANSETRON 2 MG/ML
4 INJECTION INTRAMUSCULAR; INTRAVENOUS EVERY 6 HOURS PRN
Status: DISCONTINUED | OUTPATIENT
Start: 2019-03-10 | End: 2019-03-12 | Stop reason: HOSPADM

## 2019-03-10 RX ORDER — FENTANYL CITRATE 50 UG/ML
50 INJECTION, SOLUTION INTRAMUSCULAR; INTRAVENOUS ONCE
Status: COMPLETED | OUTPATIENT
Start: 2019-03-10 | End: 2019-03-10

## 2019-03-10 RX ORDER — LORAZEPAM 0.5 MG/1
1 TABLET ORAL NIGHTLY
Status: DISCONTINUED | OUTPATIENT
Start: 2019-03-10 | End: 2019-03-12 | Stop reason: HOSPADM

## 2019-03-10 RX ORDER — 0.9 % SODIUM CHLORIDE 0.9 %
5 VIAL (ML) INJECTION DAILY
Status: DISCONTINUED | OUTPATIENT
Start: 2019-03-10 | End: 2019-03-12 | Stop reason: HOSPADM

## 2019-03-10 RX ADMIN — SODIUM CHLORIDE 1000 ML: 9 INJECTION, SOLUTION INTRAVENOUS at 07:02

## 2019-03-10 RX ADMIN — FENTANYL CITRATE 50 MCG: 50 INJECTION INTRAMUSCULAR; INTRAVENOUS at 10:20

## 2019-03-10 RX ADMIN — MORPHINE SULFATE 4 MG: 4 INJECTION, SOLUTION INTRAMUSCULAR; INTRAVENOUS at 21:56

## 2019-03-10 RX ADMIN — SODIUM CHLORIDE, PRESERVATIVE FREE 5 ML: 5 INJECTION INTRAVENOUS at 19:56

## 2019-03-10 RX ADMIN — PROMETHAZINE HYDROCHLORIDE 25 MG: 25 INJECTION INTRAMUSCULAR; INTRAVENOUS at 07:03

## 2019-03-10 RX ADMIN — IOPAMIDOL 75 ML: 755 INJECTION, SOLUTION INTRAVENOUS at 08:37

## 2019-03-10 RX ADMIN — SODIUM CHLORIDE, PRESERVATIVE FREE 10 ML: 5 INJECTION INTRAVENOUS at 21:56

## 2019-03-10 RX ADMIN — LEVOTHYROXINE SODIUM ANHYDROUS 62.5 MCG: 100 INJECTION, POWDER, LYOPHILIZED, FOR SOLUTION INTRAVENOUS at 19:55

## 2019-03-10 RX ADMIN — MORPHINE SULFATE 4 MG: 4 INJECTION INTRAVENOUS at 07:02

## 2019-03-10 RX ADMIN — ONDANSETRON 4 MG: 2 INJECTION INTRAMUSCULAR; INTRAVENOUS at 15:30

## 2019-03-10 RX ADMIN — ONDANSETRON 4 MG: 2 INJECTION INTRAMUSCULAR; INTRAVENOUS at 21:56

## 2019-03-10 RX ADMIN — SODIUM CHLORIDE: 9 INJECTION, SOLUTION INTRAVENOUS at 14:39

## 2019-03-10 RX ADMIN — PANTOPRAZOLE SODIUM 8 MG/HR: 40 INJECTION, POWDER, FOR SOLUTION INTRAVENOUS at 11:53

## 2019-03-10 RX ADMIN — PROMETHAZINE HYDROCHLORIDE 25 MG: 25 INJECTION INTRAMUSCULAR; INTRAVENOUS at 10:23

## 2019-03-10 RX ADMIN — TAZOBACTAM SODIUM AND PIPERACILLIN SODIUM 3.38 G: 375; 3 INJECTION, SOLUTION INTRAVENOUS at 19:55

## 2019-03-10 RX ADMIN — TAZOBACTAM SODIUM AND PIPERACILLIN SODIUM 4.5 G: 500; 4 INJECTION, SOLUTION INTRAVENOUS at 11:54

## 2019-03-10 RX ADMIN — MORPHINE SULFATE 4 MG: 4 INJECTION, SOLUTION INTRAMUSCULAR; INTRAVENOUS at 18:29

## 2019-03-10 RX ADMIN — PANTOPRAZOLE SODIUM 80 MG: 40 INJECTION, POWDER, FOR SOLUTION INTRAVENOUS at 10:20

## 2019-03-10 RX ADMIN — MORPHINE SULFATE 4 MG: 4 INJECTION, SOLUTION INTRAMUSCULAR; INTRAVENOUS at 15:30

## 2019-03-10 RX ADMIN — SODIUM CHLORIDE, PRESERVATIVE FREE 10 ML: 5 INJECTION INTRAVENOUS at 20:05

## 2019-03-10 RX ADMIN — SODIUM CHLORIDE: 9 INJECTION, SOLUTION INTRAVENOUS at 14:40

## 2019-03-10 RX ADMIN — LORAZEPAM 1 MG: 0.5 TABLET ORAL at 21:56

## 2019-03-10 RX ADMIN — SODIUM CHLORIDE, PRESERVATIVE FREE 10 ML: 5 INJECTION INTRAVENOUS at 08:37

## 2019-03-10 RX ADMIN — MORPHINE SULFATE 4 MG: 4 INJECTION, SOLUTION INTRAMUSCULAR; INTRAVENOUS at 08:06

## 2019-03-10 RX ADMIN — MORPHINE SULFATE 4 MG: 4 INJECTION, SOLUTION INTRAMUSCULAR; INTRAVENOUS at 12:30

## 2019-03-10 ASSESSMENT — ENCOUNTER SYMPTOMS
SORE THROAT: 0
VOMITING: 1
ANAL BLEEDING: 0
CONSTIPATION: 0
RECTAL PAIN: 0
EYE REDNESS: 0
BACK PAIN: 0
CHOKING: 0
PHOTOPHOBIA: 0
ABDOMINAL PAIN: 1
STRIDOR: 0
EYE ITCHING: 0
APNEA: 0
COLOR CHANGE: 0

## 2019-03-10 ASSESSMENT — PAIN DESCRIPTION - LOCATION
LOCATION: ABDOMEN
LOCATION: ABDOMEN

## 2019-03-10 ASSESSMENT — PAIN DESCRIPTION - PROGRESSION
CLINICAL_PROGRESSION: GRADUALLY WORSENING

## 2019-03-10 ASSESSMENT — PAIN SCALES - GENERAL
PAINLEVEL_OUTOF10: 7
PAINLEVEL_OUTOF10: 7
PAINLEVEL_OUTOF10: 5
PAINLEVEL_OUTOF10: 8
PAINLEVEL_OUTOF10: 0
PAINLEVEL_OUTOF10: 3
PAINLEVEL_OUTOF10: 7

## 2019-03-10 ASSESSMENT — PAIN DESCRIPTION - PAIN TYPE
TYPE: ACUTE PAIN
TYPE: ACUTE PAIN

## 2019-03-10 ASSESSMENT — PAIN DESCRIPTION - ORIENTATION: ORIENTATION: MID;RIGHT

## 2019-03-10 ASSESSMENT — PAIN DESCRIPTION - FREQUENCY: FREQUENCY: CONTINUOUS

## 2019-03-11 LAB
ANION GAP SERPL CALCULATED.3IONS-SCNC: 7 MMOL/L (ref 4–16)
BASOPHILS ABSOLUTE: 0 K/CU MM
BASOPHILS RELATIVE PERCENT: 0.6 % (ref 0–1)
BUN BLDV-MCNC: 12 MG/DL (ref 6–23)
CALCIUM SERPL-MCNC: 9.3 MG/DL (ref 8.3–10.6)
CHLORIDE BLD-SCNC: 108 MMOL/L (ref 99–110)
CO2: 25 MMOL/L (ref 21–32)
CREAT SERPL-MCNC: 0.8 MG/DL (ref 0.6–1.1)
DIFFERENTIAL TYPE: ABNORMAL
EOSINOPHILS ABSOLUTE: 0.4 K/CU MM
EOSINOPHILS RELATIVE PERCENT: 11.2 % (ref 0–3)
GFR AFRICAN AMERICAN: >60 ML/MIN/1.73M2
GFR NON-AFRICAN AMERICAN: >60 ML/MIN/1.73M2
GLUCOSE BLD-MCNC: 108 MG/DL (ref 70–99)
HCT VFR BLD CALC: 31.9 % (ref 37–47)
HEMOGLOBIN: 9.5 GM/DL (ref 12.5–16)
HEMOGLOBIN: 9.8 GM/DL (ref 12.5–16)
IMMATURE NEUTROPHIL %: 0 % (ref 0–0.43)
LYMPHOCYTES ABSOLUTE: 1.5 K/CU MM
LYMPHOCYTES RELATIVE PERCENT: 42.1 % (ref 24–44)
MCH RBC QN AUTO: 25.9 PG (ref 27–31)
MCHC RBC AUTO-ENTMCNC: 29.8 % (ref 32–36)
MCV RBC AUTO: 86.9 FL (ref 78–100)
MONOCYTES ABSOLUTE: 0.3 K/CU MM
MONOCYTES RELATIVE PERCENT: 7.9 % (ref 0–4)
NUCLEATED RBC %: 0 %
PDW BLD-RTO: 14.4 % (ref 11.7–14.9)
PLATELET # BLD: 133 K/CU MM (ref 140–440)
PMV BLD AUTO: 11.3 FL (ref 7.5–11.1)
POTASSIUM SERPL-SCNC: 4.4 MMOL/L (ref 3.5–5.1)
RBC # BLD: 3.67 M/CU MM (ref 4.2–5.4)
SEGMENTED NEUTROPHILS ABSOLUTE COUNT: 1.4 K/CU MM
SEGMENTED NEUTROPHILS RELATIVE PERCENT: 38.2 % (ref 36–66)
SODIUM BLD-SCNC: 140 MMOL/L (ref 135–145)
TOTAL IMMATURE NEUTOROPHIL: 0 K/CU MM
TOTAL NUCLEATED RBC: 0 K/CU MM
WBC # BLD: 3.6 K/CU MM (ref 4–10.5)

## 2019-03-11 PROCEDURE — 85025 COMPLETE CBC W/AUTO DIFF WBC: CPT

## 2019-03-11 PROCEDURE — 6360000002 HC RX W HCPCS: Performed by: FAMILY MEDICINE

## 2019-03-11 PROCEDURE — 6360000002 HC RX W HCPCS: Performed by: SPECIALIST

## 2019-03-11 PROCEDURE — 94761 N-INVAS EAR/PLS OXIMETRY MLT: CPT

## 2019-03-11 PROCEDURE — 6370000000 HC RX 637 (ALT 250 FOR IP): Performed by: HOSPITALIST

## 2019-03-11 PROCEDURE — 2580000003 HC RX 258: Performed by: SPECIALIST

## 2019-03-11 PROCEDURE — 99232 SBSQ HOSP IP/OBS MODERATE 35: CPT | Performed by: SURGERY

## 2019-03-11 PROCEDURE — 2580000003 HC RX 258: Performed by: HOSPITALIST

## 2019-03-11 PROCEDURE — 85018 HEMOGLOBIN: CPT

## 2019-03-11 PROCEDURE — C9113 INJ PANTOPRAZOLE SODIUM, VIA: HCPCS | Performed by: SPECIALIST

## 2019-03-11 PROCEDURE — 1200000000 HC SEMI PRIVATE

## 2019-03-11 PROCEDURE — 80048 BASIC METABOLIC PNL TOTAL CA: CPT

## 2019-03-11 PROCEDURE — 2500000003 HC RX 250 WO HCPCS: Performed by: FAMILY MEDICINE

## 2019-03-11 PROCEDURE — 2580000003 HC RX 258: Performed by: FAMILY MEDICINE

## 2019-03-11 PROCEDURE — 36415 COLL VENOUS BLD VENIPUNCTURE: CPT

## 2019-03-11 RX ORDER — 0.9 % SODIUM CHLORIDE 0.9 %
500 INTRAVENOUS SOLUTION INTRAVENOUS ONCE
Status: COMPLETED | OUTPATIENT
Start: 2019-03-11 | End: 2019-03-12

## 2019-03-11 RX ORDER — PROMETHAZINE HYDROCHLORIDE 25 MG/ML
12.5 INJECTION, SOLUTION INTRAMUSCULAR; INTRAVENOUS EVERY 6 HOURS PRN
Status: DISCONTINUED | OUTPATIENT
Start: 2019-03-11 | End: 2019-03-12 | Stop reason: HOSPADM

## 2019-03-11 RX ORDER — PROMETHAZINE HYDROCHLORIDE 25 MG/ML
12.5 INJECTION, SOLUTION INTRAMUSCULAR; INTRAVENOUS EVERY 6 HOURS PRN
Status: DISCONTINUED | OUTPATIENT
Start: 2019-03-11 | End: 2019-03-11

## 2019-03-11 RX ADMIN — ONDANSETRON 4 MG: 2 INJECTION INTRAMUSCULAR; INTRAVENOUS at 20:51

## 2019-03-11 RX ADMIN — LORAZEPAM 1 MG: 0.5 TABLET ORAL at 20:31

## 2019-03-11 RX ADMIN — TAZOBACTAM SODIUM AND PIPERACILLIN SODIUM 3.38 G: 375; 3 INJECTION, SOLUTION INTRAVENOUS at 20:31

## 2019-03-11 RX ADMIN — PANTOPRAZOLE SODIUM 8 MG/HR: 40 INJECTION, POWDER, FOR SOLUTION INTRAVENOUS at 18:50

## 2019-03-11 RX ADMIN — MORPHINE SULFATE 4 MG: 4 INJECTION, SOLUTION INTRAMUSCULAR; INTRAVENOUS at 20:51

## 2019-03-11 RX ADMIN — MORPHINE SULFATE 4 MG: 4 INJECTION, SOLUTION INTRAMUSCULAR; INTRAVENOUS at 15:53

## 2019-03-11 RX ADMIN — MORPHINE SULFATE 4 MG: 4 INJECTION, SOLUTION INTRAMUSCULAR; INTRAVENOUS at 13:09

## 2019-03-11 RX ADMIN — SODIUM CHLORIDE, PRESERVATIVE FREE 5 ML: 5 INJECTION INTRAVENOUS at 09:08

## 2019-03-11 RX ADMIN — LEVOTHYROXINE SODIUM ANHYDROUS 62.5 MCG: 100 INJECTION, POWDER, LYOPHILIZED, FOR SOLUTION INTRAVENOUS at 05:33

## 2019-03-11 RX ADMIN — MORPHINE SULFATE 4 MG: 4 INJECTION, SOLUTION INTRAMUSCULAR; INTRAVENOUS at 01:07

## 2019-03-11 RX ADMIN — MORPHINE SULFATE 4 MG: 4 INJECTION, SOLUTION INTRAMUSCULAR; INTRAVENOUS at 09:09

## 2019-03-11 RX ADMIN — ONDANSETRON 4 MG: 2 INJECTION INTRAMUSCULAR; INTRAVENOUS at 15:53

## 2019-03-11 RX ADMIN — SODIUM CHLORIDE: 9 INJECTION, SOLUTION INTRAVENOUS at 05:31

## 2019-03-11 RX ADMIN — PANTOPRAZOLE SODIUM 8 MG/HR: 40 INJECTION, POWDER, FOR SOLUTION INTRAVENOUS at 05:32

## 2019-03-11 RX ADMIN — TAZOBACTAM SODIUM AND PIPERACILLIN SODIUM 3.38 G: 375; 3 INJECTION, SOLUTION INTRAVENOUS at 11:49

## 2019-03-11 RX ADMIN — TAZOBACTAM SODIUM AND PIPERACILLIN SODIUM 3.38 G: 375; 3 INJECTION, SOLUTION INTRAVENOUS at 04:31

## 2019-03-11 RX ADMIN — SODIUM CHLORIDE, PRESERVATIVE FREE 10 ML: 5 INJECTION INTRAVENOUS at 09:09

## 2019-03-11 RX ADMIN — PROMETHAZINE HYDROCHLORIDE 12.5 MG: 25 INJECTION, SOLUTION INTRAMUSCULAR; INTRAVENOUS at 18:58

## 2019-03-11 RX ADMIN — MORPHINE SULFATE 4 MG: 4 INJECTION, SOLUTION INTRAMUSCULAR; INTRAVENOUS at 05:32

## 2019-03-11 RX ADMIN — SODIUM CHLORIDE 500 ML: 9 INJECTION, SOLUTION INTRAVENOUS at 23:03

## 2019-03-11 RX ADMIN — PROMETHAZINE HYDROCHLORIDE 12.5 MG: 25 INJECTION, SOLUTION INTRAMUSCULAR; INTRAVENOUS at 13:09

## 2019-03-11 RX ADMIN — ONDANSETRON 4 MG: 2 INJECTION INTRAMUSCULAR; INTRAVENOUS at 09:09

## 2019-03-11 RX ADMIN — SODIUM CHLORIDE, PRESERVATIVE FREE 10 ML: 5 INJECTION INTRAVENOUS at 20:52

## 2019-03-11 RX ADMIN — ONDANSETRON 4 MG: 2 INJECTION INTRAMUSCULAR; INTRAVENOUS at 05:32

## 2019-03-11 RX ADMIN — SODIUM CHLORIDE: 9 INJECTION, SOLUTION INTRAVENOUS at 01:14

## 2019-03-11 RX ADMIN — MORPHINE SULFATE 4 MG: 4 INJECTION, SOLUTION INTRAMUSCULAR; INTRAVENOUS at 18:14

## 2019-03-11 ASSESSMENT — PAIN SCALES - GENERAL
PAINLEVEL_OUTOF10: 8
PAINLEVEL_OUTOF10: 5
PAINLEVEL_OUTOF10: 7
PAINLEVEL_OUTOF10: 8
PAINLEVEL_OUTOF10: 7
PAINLEVEL_OUTOF10: 6
PAINLEVEL_OUTOF10: 0
PAINLEVEL_OUTOF10: 7
PAINLEVEL_OUTOF10: 4
PAINLEVEL_OUTOF10: 10
PAINLEVEL_OUTOF10: 4

## 2019-03-11 ASSESSMENT — PAIN DESCRIPTION - PROGRESSION
CLINICAL_PROGRESSION: GRADUALLY WORSENING
CLINICAL_PROGRESSION: NOT CHANGED
CLINICAL_PROGRESSION: GRADUALLY WORSENING

## 2019-03-11 ASSESSMENT — PAIN DESCRIPTION - DESCRIPTORS
DESCRIPTORS: ACHING

## 2019-03-11 ASSESSMENT — PAIN DESCRIPTION - ONSET
ONSET: ON-GOING

## 2019-03-11 ASSESSMENT — PAIN DESCRIPTION - LOCATION
LOCATION: ABDOMEN

## 2019-03-11 ASSESSMENT — PAIN DESCRIPTION - PAIN TYPE
TYPE: ACUTE PAIN

## 2019-03-11 ASSESSMENT — PAIN DESCRIPTION - FREQUENCY
FREQUENCY: INTERMITTENT

## 2019-03-11 ASSESSMENT — PAIN DESCRIPTION - ORIENTATION: ORIENTATION: MID;RIGHT

## 2019-03-12 ENCOUNTER — ANESTHESIA (OUTPATIENT)
Dept: ENDOSCOPY | Age: 49
DRG: 920 | End: 2019-03-12
Payer: COMMERCIAL

## 2019-03-12 ENCOUNTER — ANESTHESIA EVENT (OUTPATIENT)
Dept: ENDOSCOPY | Age: 49
DRG: 920 | End: 2019-03-12
Payer: COMMERCIAL

## 2019-03-12 VITALS
DIASTOLIC BLOOD PRESSURE: 66 MMHG | WEIGHT: 293 LBS | BODY MASS INDEX: 50.02 KG/M2 | RESPIRATION RATE: 20 BRPM | HEIGHT: 64 IN | TEMPERATURE: 97.9 F | HEART RATE: 66 BPM | SYSTOLIC BLOOD PRESSURE: 128 MMHG | OXYGEN SATURATION: 91 %

## 2019-03-12 VITALS
DIASTOLIC BLOOD PRESSURE: 63 MMHG | SYSTOLIC BLOOD PRESSURE: 87 MMHG | OXYGEN SATURATION: 94 % | RESPIRATION RATE: 12 BRPM

## 2019-03-12 PROBLEM — R13.10 DYSPHAGIA: Status: ACTIVE | Noted: 2019-03-12

## 2019-03-12 LAB
ALBUMIN SERPL-MCNC: 4 GM/DL (ref 3.4–5)
ALP BLD-CCNC: 128 IU/L (ref 40–129)
ALT SERPL-CCNC: 44 U/L (ref 10–40)
AMYLASE: 23 U/L (ref 25–115)
ANION GAP SERPL CALCULATED.3IONS-SCNC: 8 MMOL/L (ref 4–16)
AST SERPL-CCNC: 33 IU/L (ref 15–37)
BASOPHILS ABSOLUTE: 0 K/CU MM
BASOPHILS RELATIVE PERCENT: 0.6 % (ref 0–1)
BILIRUB SERPL-MCNC: 0.2 MG/DL (ref 0–1)
BUN BLDV-MCNC: 9 MG/DL (ref 6–23)
CALCIUM SERPL-MCNC: 9.1 MG/DL (ref 8.3–10.6)
CHLORIDE BLD-SCNC: 113 MMOL/L (ref 99–110)
CO2: 23 MMOL/L (ref 21–32)
CREAT SERPL-MCNC: 0.8 MG/DL (ref 0.6–1.1)
DIFFERENTIAL TYPE: ABNORMAL
EOSINOPHILS ABSOLUTE: 0.4 K/CU MM
EOSINOPHILS RELATIVE PERCENT: 12.2 % (ref 0–3)
GFR AFRICAN AMERICAN: >60 ML/MIN/1.73M2
GFR NON-AFRICAN AMERICAN: >60 ML/MIN/1.73M2
GLUCOSE BLD-MCNC: 95 MG/DL (ref 70–99)
HCT VFR BLD CALC: 32.9 % (ref 37–47)
HEMOGLOBIN: 9.7 GM/DL (ref 12.5–16)
IMMATURE NEUTROPHIL %: 0.3 % (ref 0–0.43)
LIPASE: 18 IU/L (ref 13–60)
LYMPHOCYTES ABSOLUTE: 1.3 K/CU MM
LYMPHOCYTES RELATIVE PERCENT: 38.2 % (ref 24–44)
MCH RBC QN AUTO: 26.2 PG (ref 27–31)
MCHC RBC AUTO-ENTMCNC: 29.5 % (ref 32–36)
MCV RBC AUTO: 88.9 FL (ref 78–100)
MONOCYTES ABSOLUTE: 0.3 K/CU MM
MONOCYTES RELATIVE PERCENT: 8.1 % (ref 0–4)
NUCLEATED RBC %: 0 %
PDW BLD-RTO: 14.3 % (ref 11.7–14.9)
PLATELET # BLD: 155 K/CU MM (ref 140–440)
PMV BLD AUTO: 11.2 FL (ref 7.5–11.1)
POTASSIUM SERPL-SCNC: 3.7 MMOL/L (ref 3.5–5.1)
RBC # BLD: 3.7 M/CU MM (ref 4.2–5.4)
SEGMENTED NEUTROPHILS ABSOLUTE COUNT: 1.4 K/CU MM
SEGMENTED NEUTROPHILS RELATIVE PERCENT: 40.6 % (ref 36–66)
SODIUM BLD-SCNC: 144 MMOL/L (ref 135–145)
TOTAL IMMATURE NEUTOROPHIL: 0.01 K/CU MM
TOTAL NUCLEATED RBC: 0 K/CU MM
TOTAL PROTEIN: 6 GM/DL (ref 6.4–8.2)
WBC # BLD: 3.4 K/CU MM (ref 4–10.5)

## 2019-03-12 PROCEDURE — 99232 SBSQ HOSP IP/OBS MODERATE 35: CPT | Performed by: SURGERY

## 2019-03-12 PROCEDURE — 6360000002 HC RX W HCPCS: Performed by: FAMILY MEDICINE

## 2019-03-12 PROCEDURE — 6360000002 HC RX W HCPCS: Performed by: SPECIALIST

## 2019-03-12 PROCEDURE — 80053 COMPREHEN METABOLIC PANEL: CPT

## 2019-03-12 PROCEDURE — 3700000001 HC ADD 15 MINUTES (ANESTHESIA)

## 2019-03-12 PROCEDURE — 2580000003 HC RX 258: Performed by: FAMILY MEDICINE

## 2019-03-12 PROCEDURE — 83690 ASSAY OF LIPASE: CPT

## 2019-03-12 PROCEDURE — 0DJ08ZZ INSPECTION OF UPPER INTESTINAL TRACT, VIA NATURAL OR ARTIFICIAL OPENING ENDOSCOPIC: ICD-10-PCS | Performed by: FAMILY MEDICINE

## 2019-03-12 PROCEDURE — 6360000002 HC RX W HCPCS: Performed by: NURSE ANESTHETIST, CERTIFIED REGISTERED

## 2019-03-12 PROCEDURE — 36415 COLL VENOUS BLD VENIPUNCTURE: CPT

## 2019-03-12 PROCEDURE — 82150 ASSAY OF AMYLASE: CPT

## 2019-03-12 PROCEDURE — C9113 INJ PANTOPRAZOLE SODIUM, VIA: HCPCS | Performed by: SPECIALIST

## 2019-03-12 PROCEDURE — 2500000003 HC RX 250 WO HCPCS: Performed by: NURSE ANESTHETIST, CERTIFIED REGISTERED

## 2019-03-12 PROCEDURE — 94761 N-INVAS EAR/PLS OXIMETRY MLT: CPT

## 2019-03-12 PROCEDURE — 3700000000 HC ANESTHESIA ATTENDED CARE

## 2019-03-12 PROCEDURE — 2500000003 HC RX 250 WO HCPCS: Performed by: FAMILY MEDICINE

## 2019-03-12 PROCEDURE — 43235 EGD DIAGNOSTIC BRUSH WASH: CPT | Performed by: SURGERY

## 2019-03-12 PROCEDURE — 3609012800 HC EGD DIAGNOSTIC ONLY: Performed by: SURGERY

## 2019-03-12 PROCEDURE — 85025 COMPLETE CBC W/AUTO DIFF WBC: CPT

## 2019-03-12 PROCEDURE — 2580000003 HC RX 258: Performed by: SPECIALIST

## 2019-03-12 PROCEDURE — 2709999900 HC NON-CHARGEABLE SUPPLY: Performed by: SURGERY

## 2019-03-12 RX ORDER — PROPOFOL 10 MG/ML
INJECTION, EMULSION INTRAVENOUS PRN
Status: DISCONTINUED | OUTPATIENT
Start: 2019-03-12 | End: 2019-03-12 | Stop reason: SDUPTHER

## 2019-03-12 RX ORDER — LIDOCAINE HYDROCHLORIDE 20 MG/ML
INJECTION, SOLUTION INFILTRATION; PERINEURAL PRN
Status: DISCONTINUED | OUTPATIENT
Start: 2019-03-12 | End: 2019-03-12 | Stop reason: SDUPTHER

## 2019-03-12 RX ORDER — PANTOPRAZOLE SODIUM 40 MG/10ML
40 INJECTION, POWDER, LYOPHILIZED, FOR SOLUTION INTRAVENOUS 2 TIMES DAILY
Status: DISCONTINUED | OUTPATIENT
Start: 2019-03-12 | End: 2019-03-12 | Stop reason: HOSPADM

## 2019-03-12 RX ADMIN — PANTOPRAZOLE SODIUM 8 MG/HR: 40 INJECTION, POWDER, FOR SOLUTION INTRAVENOUS at 06:12

## 2019-03-12 RX ADMIN — SODIUM CHLORIDE: 9 INJECTION, SOLUTION INTRAVENOUS at 04:30

## 2019-03-12 RX ADMIN — MORPHINE SULFATE 2 MG: 4 INJECTION, SOLUTION INTRAMUSCULAR; INTRAVENOUS at 01:24

## 2019-03-12 RX ADMIN — SODIUM CHLORIDE, PRESERVATIVE FREE 10 ML: 5 INJECTION INTRAVENOUS at 08:52

## 2019-03-12 RX ADMIN — PROMETHAZINE HYDROCHLORIDE 12.5 MG: 25 INJECTION, SOLUTION INTRAMUSCULAR; INTRAVENOUS at 16:37

## 2019-03-12 RX ADMIN — LEVOTHYROXINE SODIUM ANHYDROUS 62.5 MCG: 100 INJECTION, POWDER, LYOPHILIZED, FOR SOLUTION INTRAVENOUS at 08:52

## 2019-03-12 RX ADMIN — LIDOCAINE HYDROCHLORIDE 100 MG: 20 INJECTION, SOLUTION INFILTRATION; PERINEURAL at 14:07

## 2019-03-12 RX ADMIN — SODIUM CHLORIDE, PRESERVATIVE FREE 5 ML: 5 INJECTION INTRAVENOUS at 08:53

## 2019-03-12 RX ADMIN — PROPOFOL 200 MG: 10 INJECTION, EMULSION INTRAVENOUS at 14:07

## 2019-03-12 RX ADMIN — TAZOBACTAM SODIUM AND PIPERACILLIN SODIUM 3.38 G: 375; 3 INJECTION, SOLUTION INTRAVENOUS at 11:11

## 2019-03-12 RX ADMIN — MORPHINE SULFATE 2 MG: 4 INJECTION, SOLUTION INTRAMUSCULAR; INTRAVENOUS at 08:57

## 2019-03-12 RX ADMIN — MORPHINE SULFATE 2 MG: 4 INJECTION, SOLUTION INTRAMUSCULAR; INTRAVENOUS at 11:11

## 2019-03-12 RX ADMIN — MORPHINE SULFATE 2 MG: 4 INJECTION, SOLUTION INTRAMUSCULAR; INTRAVENOUS at 04:29

## 2019-03-12 RX ADMIN — MORPHINE SULFATE 2 MG: 4 INJECTION, SOLUTION INTRAMUSCULAR; INTRAVENOUS at 14:42

## 2019-03-12 RX ADMIN — TAZOBACTAM SODIUM AND PIPERACILLIN SODIUM 3.38 G: 375; 3 INJECTION, SOLUTION INTRAVENOUS at 04:29

## 2019-03-12 RX ADMIN — ONDANSETRON 4 MG: 2 INJECTION INTRAMUSCULAR; INTRAVENOUS at 04:29

## 2019-03-12 RX ADMIN — PROMETHAZINE HYDROCHLORIDE 12.5 MG: 25 INJECTION, SOLUTION INTRAMUSCULAR; INTRAVENOUS at 09:20

## 2019-03-12 RX ADMIN — MORPHINE SULFATE 2 MG: 4 INJECTION, SOLUTION INTRAMUSCULAR; INTRAVENOUS at 06:43

## 2019-03-12 RX ADMIN — ONDANSETRON 4 MG: 2 INJECTION INTRAMUSCULAR; INTRAVENOUS at 14:42

## 2019-03-12 RX ADMIN — PANTOPRAZOLE SODIUM 40 MG: 40 INJECTION, POWDER, FOR SOLUTION INTRAVENOUS at 11:11

## 2019-03-12 RX ADMIN — PHENYLEPHRINE HYDROCHLORIDE 200 MCG: 10 INJECTION INTRAVENOUS at 14:15

## 2019-03-12 RX ADMIN — PHENYLEPHRINE HYDROCHLORIDE 200 MCG: 10 INJECTION INTRAVENOUS at 14:10

## 2019-03-12 RX ADMIN — PROMETHAZINE HYDROCHLORIDE 12.5 MG: 25 INJECTION, SOLUTION INTRAMUSCULAR; INTRAVENOUS at 01:50

## 2019-03-12 ASSESSMENT — PAIN SCALES - GENERAL
PAINLEVEL_OUTOF10: 8
PAINLEVEL_OUTOF10: 8
PAINLEVEL_OUTOF10: 6
PAINLEVEL_OUTOF10: 8
PAINLEVEL_OUTOF10: 6
PAINLEVEL_OUTOF10: 6
PAINLEVEL_OUTOF10: 0

## 2019-03-12 ASSESSMENT — PAIN DESCRIPTION - ORIENTATION
ORIENTATION: MID
ORIENTATION: MID

## 2019-03-12 ASSESSMENT — ENCOUNTER SYMPTOMS: SHORTNESS OF BREATH: 1

## 2019-03-12 ASSESSMENT — PAIN DESCRIPTION - PAIN TYPE
TYPE: ACUTE PAIN

## 2019-03-12 ASSESSMENT — PAIN DESCRIPTION - ONSET
ONSET: ON-GOING
ONSET: ON-GOING

## 2019-03-12 ASSESSMENT — PAIN - FUNCTIONAL ASSESSMENT
PAIN_FUNCTIONAL_ASSESSMENT: ACTIVITIES ARE NOT PREVENTED
PAIN_FUNCTIONAL_ASSESSMENT: ACTIVITIES ARE NOT PREVENTED

## 2019-03-12 ASSESSMENT — PAIN DESCRIPTION - FREQUENCY
FREQUENCY: INTERMITTENT
FREQUENCY: CONTINUOUS

## 2019-03-12 ASSESSMENT — PAIN DESCRIPTION - LOCATION
LOCATION: ABDOMEN
LOCATION: ABDOMEN

## 2019-03-12 ASSESSMENT — PAIN DESCRIPTION - DESCRIPTORS
DESCRIPTORS: CRAMPING
DESCRIPTORS: SHARP

## 2019-03-14 LAB
ABO/RH: NORMAL
ANTIBODY DATA: NORMAL
ANTIBODY SCREEN: POSITIVE
ANTIGEN DATA: NORMAL
COMPONENT: NORMAL
COMPONENT: NORMAL
CROSSMATCH RESULT: NORMAL
CROSSMATCH RESULT: NORMAL
STATUS: NORMAL
STATUS: NORMAL
TRANSFUSION STATUS: NORMAL
TRANSFUSION STATUS: NORMAL
UNIT DIVISION: 0
UNIT DIVISION: 0
UNIT NUMBER: NORMAL
UNIT NUMBER: NORMAL

## 2019-03-15 LAB
CULTURE: NORMAL
CULTURE: NORMAL
Lab: NORMAL
Lab: NORMAL
SPECIMEN: NORMAL
SPECIMEN: NORMAL

## 2019-03-16 ENCOUNTER — APPOINTMENT (OUTPATIENT)
Dept: CT IMAGING | Age: 49
End: 2019-03-16
Payer: COMMERCIAL

## 2019-03-16 ENCOUNTER — HOSPITAL ENCOUNTER (EMERGENCY)
Age: 49
Discharge: HOME | End: 2019-03-16
Attending: EMERGENCY MEDICINE
Payer: COMMERCIAL

## 2019-03-16 VITALS
SYSTOLIC BLOOD PRESSURE: 131 MMHG | OXYGEN SATURATION: 100 % | HEART RATE: 61 BPM | WEIGHT: 293 LBS | TEMPERATURE: 98.4 F | RESPIRATION RATE: 19 BRPM | BODY MASS INDEX: 50.02 KG/M2 | DIASTOLIC BLOOD PRESSURE: 90 MMHG | HEIGHT: 64 IN

## 2019-03-16 DIAGNOSIS — R10.84 GENERALIZED ABDOMINAL PAIN: Primary | ICD-10-CM

## 2019-03-16 DIAGNOSIS — R11.2 NON-INTRACTABLE VOMITING WITH NAUSEA, UNSPECIFIED VOMITING TYPE: ICD-10-CM

## 2019-03-16 LAB
ALBUMIN SERPL-MCNC: 3.9 GM/DL (ref 3.4–5)
ALP BLD-CCNC: 121 IU/L (ref 40–129)
ALT SERPL-CCNC: 31 U/L (ref 10–40)
ANION GAP SERPL CALCULATED.3IONS-SCNC: 9 MMOL/L (ref 4–16)
AST SERPL-CCNC: 37 IU/L (ref 15–37)
BACTERIA: ABNORMAL /HPF
BASOPHILS ABSOLUTE: 0 K/CU MM
BASOPHILS RELATIVE PERCENT: 0.7 % (ref 0–1)
BILIRUB SERPL-MCNC: 0.3 MG/DL (ref 0–1)
BILIRUBIN URINE: NEGATIVE MG/DL
BLOOD, URINE: NEGATIVE
BUN BLDV-MCNC: 6 MG/DL (ref 6–23)
CALCIUM SERPL-MCNC: 9.8 MG/DL (ref 8.3–10.6)
CHLORIDE BLD-SCNC: 106 MMOL/L (ref 99–110)
CLARITY: CLEAR
CO2: 25 MMOL/L (ref 21–32)
COLOR: YELLOW
CREAT SERPL-MCNC: 0.7 MG/DL (ref 0.6–1.1)
DIFFERENTIAL TYPE: ABNORMAL
EOSINOPHILS ABSOLUTE: 0.4 K/CU MM
EOSINOPHILS RELATIVE PERCENT: 9.5 % (ref 0–3)
GFR AFRICAN AMERICAN: >60 ML/MIN/1.73M2
GFR NON-AFRICAN AMERICAN: >60 ML/MIN/1.73M2
GLUCOSE BLD-MCNC: 95 MG/DL (ref 70–99)
GLUCOSE, URINE: NEGATIVE MG/DL
HCT VFR BLD CALC: 35.8 % (ref 37–47)
HEMOGLOBIN: 10.4 GM/DL (ref 12.5–16)
IMMATURE NEUTROPHIL %: 0.2 % (ref 0–0.43)
KETONES, URINE: NEGATIVE MG/DL
LACTATE: 0.8 MMOL/L (ref 0.4–2)
LEUKOCYTE ESTERASE, URINE: NEGATIVE
LIPASE: 16 IU/L (ref 13–60)
LYMPHOCYTES ABSOLUTE: 1.6 K/CU MM
LYMPHOCYTES RELATIVE PERCENT: 38.7 % (ref 24–44)
MCH RBC QN AUTO: 25.2 PG (ref 27–31)
MCHC RBC AUTO-ENTMCNC: 29.1 % (ref 32–36)
MCV RBC AUTO: 86.7 FL (ref 78–100)
MONOCYTES ABSOLUTE: 0.3 K/CU MM
MONOCYTES RELATIVE PERCENT: 7 % (ref 0–4)
MUCUS: ABNORMAL HPF
NITRITE URINE, QUANTITATIVE: NEGATIVE
NUCLEATED RBC %: 0 %
PDW BLD-RTO: 14.5 % (ref 11.7–14.9)
PH, URINE: 7 (ref 5–8)
PLATELET # BLD: 160 K/CU MM (ref 140–440)
PMV BLD AUTO: 11.2 FL (ref 7.5–11.1)
POTASSIUM SERPL-SCNC: 3.9 MMOL/L (ref 3.5–5.1)
PRO-BNP: 1131 PG/ML
PROTEIN UA: NEGATIVE MG/DL
RBC # BLD: 4.13 M/CU MM (ref 4.2–5.4)
RBC URINE: ABNORMAL /HPF (ref 0–6)
SEGMENTED NEUTROPHILS ABSOLUTE COUNT: 1.8 K/CU MM
SEGMENTED NEUTROPHILS RELATIVE PERCENT: 43.9 % (ref 36–66)
SODIUM BLD-SCNC: 140 MMOL/L (ref 135–145)
SPECIFIC GRAVITY UA: 1.01 (ref 1–1.03)
SQUAMOUS EPITHELIAL: 11 /HPF
TOTAL IMMATURE NEUTOROPHIL: 0.01 K/CU MM
TOTAL NUCLEATED RBC: 0 K/CU MM
TOTAL PROTEIN: 6.7 GM/DL (ref 6.4–8.2)
TRANSITIONAL EPITHELIAL: <1 /HPF
TRICHOMONAS: ABNORMAL /HPF
TROPONIN T: <0.01 NG/ML
UROBILINOGEN, URINE: NORMAL MG/DL (ref 0.2–1)
WBC # BLD: 4 K/CU MM (ref 4–10.5)
WBC UA: <1 /HPF (ref 0–5)

## 2019-03-16 PROCEDURE — 96376 TX/PRO/DX INJ SAME DRUG ADON: CPT

## 2019-03-16 PROCEDURE — 71275 CT ANGIOGRAPHY CHEST: CPT

## 2019-03-16 PROCEDURE — 84484 ASSAY OF TROPONIN QUANT: CPT

## 2019-03-16 PROCEDURE — 96375 TX/PRO/DX INJ NEW DRUG ADDON: CPT

## 2019-03-16 PROCEDURE — 99284 EMERGENCY DEPT VISIT MOD MDM: CPT

## 2019-03-16 PROCEDURE — 83690 ASSAY OF LIPASE: CPT

## 2019-03-16 PROCEDURE — 93005 ELECTROCARDIOGRAM TRACING: CPT | Performed by: EMERGENCY MEDICINE

## 2019-03-16 PROCEDURE — 74177 CT ABD & PELVIS W/CONTRAST: CPT

## 2019-03-16 PROCEDURE — 85025 COMPLETE CBC W/AUTO DIFF WBC: CPT

## 2019-03-16 PROCEDURE — 96374 THER/PROPH/DIAG INJ IV PUSH: CPT

## 2019-03-16 PROCEDURE — 2580000003 HC RX 258: Performed by: EMERGENCY MEDICINE

## 2019-03-16 PROCEDURE — 6360000002 HC RX W HCPCS: Performed by: EMERGENCY MEDICINE

## 2019-03-16 PROCEDURE — 96372 THER/PROPH/DIAG INJ SC/IM: CPT

## 2019-03-16 PROCEDURE — 6360000004 HC RX CONTRAST MEDICATION: Performed by: EMERGENCY MEDICINE

## 2019-03-16 PROCEDURE — 83880 ASSAY OF NATRIURETIC PEPTIDE: CPT

## 2019-03-16 PROCEDURE — 80053 COMPREHEN METABOLIC PANEL: CPT

## 2019-03-16 PROCEDURE — 81001 URINALYSIS AUTO W/SCOPE: CPT

## 2019-03-16 PROCEDURE — 83605 ASSAY OF LACTIC ACID: CPT

## 2019-03-16 RX ORDER — ONDANSETRON 2 MG/ML
4 INJECTION INTRAMUSCULAR; INTRAVENOUS EVERY 6 HOURS PRN
Status: DISCONTINUED | OUTPATIENT
Start: 2019-03-16 | End: 2019-03-16 | Stop reason: HOSPADM

## 2019-03-16 RX ORDER — PROMETHAZINE HYDROCHLORIDE 25 MG/ML
25 INJECTION, SOLUTION INTRAMUSCULAR; INTRAVENOUS ONCE
Status: COMPLETED | OUTPATIENT
Start: 2019-03-16 | End: 2019-03-16

## 2019-03-16 RX ORDER — 0.9 % SODIUM CHLORIDE 0.9 %
1000 INTRAVENOUS SOLUTION INTRAVENOUS ONCE
Status: COMPLETED | OUTPATIENT
Start: 2019-03-16 | End: 2019-03-16

## 2019-03-16 RX ORDER — DICYCLOMINE HYDROCHLORIDE 10 MG/ML
20 INJECTION INTRAMUSCULAR ONCE
Status: COMPLETED | OUTPATIENT
Start: 2019-03-16 | End: 2019-03-16

## 2019-03-16 RX ORDER — MORPHINE SULFATE 4 MG/ML
4 INJECTION, SOLUTION INTRAMUSCULAR; INTRAVENOUS EVERY 30 MIN PRN
Status: DISCONTINUED | OUTPATIENT
Start: 2019-03-16 | End: 2019-03-16 | Stop reason: HOSPADM

## 2019-03-16 RX ADMIN — DICYCLOMINE HYDROCHLORIDE 20 MG: 10 INJECTION INTRAMUSCULAR at 21:10

## 2019-03-16 RX ADMIN — SODIUM CHLORIDE 1000 ML: 9 INJECTION, SOLUTION INTRAVENOUS at 18:19

## 2019-03-16 RX ADMIN — PROMETHAZINE HYDROCHLORIDE 25 MG: 25 INJECTION INTRAMUSCULAR; INTRAVENOUS at 19:34

## 2019-03-16 RX ADMIN — IOPAMIDOL 100 ML: 755 INJECTION, SOLUTION INTRAVENOUS at 19:25

## 2019-03-16 RX ADMIN — MORPHINE SULFATE 4 MG: 4 INJECTION INTRAVENOUS at 18:20

## 2019-03-16 RX ADMIN — ONDANSETRON 4 MG: 2 INJECTION INTRAMUSCULAR; INTRAVENOUS at 18:20

## 2019-03-16 RX ADMIN — MORPHINE SULFATE 4 MG: 4 INJECTION INTRAVENOUS at 19:38

## 2019-03-16 ASSESSMENT — PAIN DESCRIPTION - PAIN TYPE: TYPE: ACUTE PAIN

## 2019-03-16 ASSESSMENT — PAIN SCALES - GENERAL
PAINLEVEL_OUTOF10: 7
PAINLEVEL_OUTOF10: 7
PAINLEVEL_OUTOF10: 8

## 2019-03-16 ASSESSMENT — PAIN DESCRIPTION - LOCATION: LOCATION: ABDOMEN

## 2019-03-17 PROCEDURE — 93010 ELECTROCARDIOGRAM REPORT: CPT | Performed by: INTERNAL MEDICINE

## 2019-03-19 ENCOUNTER — TELEPHONE (OUTPATIENT)
Dept: BARIATRICS/WEIGHT MGMT | Age: 49
End: 2019-03-19

## 2019-03-19 LAB
EKG ATRIAL RATE: 54 BPM
EKG DIAGNOSIS: NORMAL
EKG P AXIS: 43 DEGREES
EKG P-R INTERVAL: 198 MS
EKG Q-T INTERVAL: 440 MS
EKG QRS DURATION: 96 MS
EKG QTC CALCULATION (BAZETT): 417 MS
EKG R AXIS: -13 DEGREES
EKG T AXIS: 13 DEGREES
EKG VENTRICULAR RATE: 54 BPM

## 2019-03-20 ENCOUNTER — HOSPITAL ENCOUNTER (EMERGENCY)
Age: 49
Discharge: HOME | End: 2019-03-20
Payer: COMMERCIAL

## 2019-03-20 ENCOUNTER — APPOINTMENT (OUTPATIENT)
Dept: GENERAL RADIOLOGY | Age: 49
End: 2019-03-20
Payer: COMMERCIAL

## 2019-03-20 ENCOUNTER — APPOINTMENT (OUTPATIENT)
Dept: CT IMAGING | Age: 49
End: 2019-03-20
Payer: COMMERCIAL

## 2019-03-20 VITALS
SYSTOLIC BLOOD PRESSURE: 129 MMHG | DIASTOLIC BLOOD PRESSURE: 67 MMHG | OXYGEN SATURATION: 99 % | RESPIRATION RATE: 19 BRPM | HEART RATE: 68 BPM | TEMPERATURE: 98.5 F | WEIGHT: 290 LBS | BODY MASS INDEX: 49.51 KG/M2 | HEIGHT: 64 IN

## 2019-03-20 DIAGNOSIS — R10.10 PAIN OF UPPER ABDOMEN: Primary | ICD-10-CM

## 2019-03-20 LAB
ALBUMIN SERPL-MCNC: 3.9 GM/DL (ref 3.4–5)
ALP BLD-CCNC: 121 IU/L (ref 40–129)
ALT SERPL-CCNC: 29 U/L (ref 10–40)
ANION GAP SERPL CALCULATED.3IONS-SCNC: 12 MMOL/L (ref 4–16)
APTT: 26.4 SECONDS (ref 21.2–33)
AST SERPL-CCNC: 37 IU/L (ref 15–37)
BACTERIA: NEGATIVE /HPF
BASOPHILS ABSOLUTE: 0 K/CU MM
BASOPHILS RELATIVE PERCENT: 0.7 % (ref 0–1)
BILIRUB SERPL-MCNC: 0.3 MG/DL (ref 0–1)
BILIRUBIN URINE: NEGATIVE MG/DL
BLOOD, URINE: NEGATIVE
BUN BLDV-MCNC: 9 MG/DL (ref 6–23)
CALCIUM SERPL-MCNC: 9.8 MG/DL (ref 8.3–10.6)
CHLORIDE BLD-SCNC: 108 MMOL/L (ref 99–110)
CLARITY: CLEAR
CO2: 21 MMOL/L (ref 21–32)
COLOR: ABNORMAL
CREAT SERPL-MCNC: 0.8 MG/DL (ref 0.6–1.1)
DIFFERENTIAL TYPE: ABNORMAL
EOSINOPHILS ABSOLUTE: 0.3 K/CU MM
EOSINOPHILS RELATIVE PERCENT: 7.4 % (ref 0–3)
GFR AFRICAN AMERICAN: >60 ML/MIN/1.73M2
GFR NON-AFRICAN AMERICAN: >60 ML/MIN/1.73M2
GLUCOSE BLD-MCNC: 92 MG/DL (ref 70–99)
GLUCOSE, URINE: NEGATIVE MG/DL
HCT VFR BLD CALC: 36.5 % (ref 37–47)
HEMOGLOBIN: 10.8 GM/DL (ref 12.5–16)
IMMATURE NEUTROPHIL %: 0.2 % (ref 0–0.43)
INR BLD: 0.99 INDEX
KETONES, URINE: NEGATIVE MG/DL
LEUKOCYTE ESTERASE, URINE: NEGATIVE
LIPASE: 21 IU/L (ref 13–60)
LYMPHOCYTES ABSOLUTE: 1.4 K/CU MM
LYMPHOCYTES RELATIVE PERCENT: 30.8 % (ref 24–44)
MCH RBC QN AUTO: 25.2 PG (ref 27–31)
MCHC RBC AUTO-ENTMCNC: 29.6 % (ref 32–36)
MCV RBC AUTO: 85.3 FL (ref 78–100)
MONOCYTES ABSOLUTE: 0.3 K/CU MM
MONOCYTES RELATIVE PERCENT: 5.9 % (ref 0–4)
NITRITE URINE, QUANTITATIVE: NEGATIVE
NUCLEATED RBC %: 0 %
PDW BLD-RTO: 14.4 % (ref 11.7–14.9)
PH, URINE: 8 (ref 5–8)
PLATELET # BLD: 224 K/CU MM (ref 140–440)
PMV BLD AUTO: 11.1 FL (ref 7.5–11.1)
POTASSIUM SERPL-SCNC: 4.2 MMOL/L (ref 3.5–5.1)
PROTEIN UA: NEGATIVE MG/DL
PROTHROMBIN TIME: 11.3 SECONDS (ref 9.12–12.5)
RAPID INFLUENZA  B AGN: NEGATIVE
RAPID INFLUENZA A AGN: NEGATIVE
RBC # BLD: 4.28 M/CU MM (ref 4.2–5.4)
RBC URINE: ABNORMAL /HPF (ref 0–6)
SEGMENTED NEUTROPHILS ABSOLUTE COUNT: 2.5 K/CU MM
SEGMENTED NEUTROPHILS RELATIVE PERCENT: 55 % (ref 36–66)
SODIUM BLD-SCNC: 141 MMOL/L (ref 135–145)
SPECIFIC GRAVITY UA: 1.01 (ref 1–1.03)
SQUAMOUS EPITHELIAL: 5 /HPF
TOTAL IMMATURE NEUTOROPHIL: 0.01 K/CU MM
TOTAL NUCLEATED RBC: 0 K/CU MM
TOTAL PROTEIN: 7.2 GM/DL (ref 6.4–8.2)
TRICHOMONAS: ABNORMAL /HPF
UROBILINOGEN, URINE: NORMAL MG/DL (ref 0.2–1)
WBC # BLD: 4.6 K/CU MM (ref 4–10.5)
WBC UA: 1 /HPF (ref 0–5)

## 2019-03-20 PROCEDURE — 80053 COMPREHEN METABOLIC PANEL: CPT

## 2019-03-20 PROCEDURE — 86900 BLOOD TYPING SEROLOGIC ABO: CPT

## 2019-03-20 PROCEDURE — 86901 BLOOD TYPING SEROLOGIC RH(D): CPT

## 2019-03-20 PROCEDURE — 86870 RBC ANTIBODY IDENTIFICATION: CPT

## 2019-03-20 PROCEDURE — 83690 ASSAY OF LIPASE: CPT

## 2019-03-20 PROCEDURE — 74176 CT ABD & PELVIS W/O CONTRAST: CPT

## 2019-03-20 PROCEDURE — 96374 THER/PROPH/DIAG INJ IV PUSH: CPT

## 2019-03-20 PROCEDURE — 81001 URINALYSIS AUTO W/SCOPE: CPT

## 2019-03-20 PROCEDURE — 6360000002 HC RX W HCPCS: Performed by: PHYSICIAN ASSISTANT

## 2019-03-20 PROCEDURE — 86850 RBC ANTIBODY SCREEN: CPT

## 2019-03-20 PROCEDURE — 85610 PROTHROMBIN TIME: CPT

## 2019-03-20 PROCEDURE — 87804 INFLUENZA ASSAY W/OPTIC: CPT

## 2019-03-20 PROCEDURE — 85025 COMPLETE CBC W/AUTO DIFF WBC: CPT

## 2019-03-20 PROCEDURE — 85730 THROMBOPLASTIN TIME PARTIAL: CPT

## 2019-03-20 PROCEDURE — 99284 EMERGENCY DEPT VISIT MOD MDM: CPT

## 2019-03-20 PROCEDURE — 71045 X-RAY EXAM CHEST 1 VIEW: CPT

## 2019-03-20 PROCEDURE — 96376 TX/PRO/DX INJ SAME DRUG ADON: CPT

## 2019-03-20 PROCEDURE — 96372 THER/PROPH/DIAG INJ SC/IM: CPT

## 2019-03-20 PROCEDURE — 6370000000 HC RX 637 (ALT 250 FOR IP): Performed by: PHYSICIAN ASSISTANT

## 2019-03-20 RX ORDER — HYDROCODONE BITARTRATE AND ACETAMINOPHEN 5; 325 MG/1; MG/1
2 TABLET ORAL ONCE
Status: DISCONTINUED | OUTPATIENT
Start: 2019-03-20 | End: 2019-03-20

## 2019-03-20 RX ORDER — MORPHINE SULFATE 4 MG/ML
4 INJECTION, SOLUTION INTRAMUSCULAR; INTRAVENOUS ONCE
Status: COMPLETED | OUTPATIENT
Start: 2019-03-20 | End: 2019-03-20

## 2019-03-20 RX ORDER — PROMETHAZINE HYDROCHLORIDE 25 MG/ML
25 INJECTION, SOLUTION INTRAMUSCULAR; INTRAVENOUS ONCE
Status: COMPLETED | OUTPATIENT
Start: 2019-03-20 | End: 2019-03-20

## 2019-03-20 RX ORDER — PANTOPRAZOLE SODIUM 40 MG/1
40 TABLET, DELAYED RELEASE ORAL ONCE
Status: COMPLETED | OUTPATIENT
Start: 2019-03-20 | End: 2019-03-20

## 2019-03-20 RX ADMIN — MORPHINE SULFATE 4 MG: 4 INJECTION INTRAVENOUS at 21:24

## 2019-03-20 RX ADMIN — PROMETHAZINE HYDROCHLORIDE 25 MG: 25 INJECTION INTRAMUSCULAR; INTRAVENOUS at 19:44

## 2019-03-20 RX ADMIN — PANTOPRAZOLE SODIUM 40 MG: 40 TABLET, DELAYED RELEASE ORAL at 19:44

## 2019-03-20 RX ADMIN — MORPHINE SULFATE 4 MG: 4 INJECTION INTRAVENOUS at 19:43

## 2019-03-20 ASSESSMENT — PAIN DESCRIPTION - ORIENTATION: ORIENTATION: MID

## 2019-03-20 ASSESSMENT — PAIN SCALES - GENERAL
PAINLEVEL_OUTOF10: 8
PAINLEVEL_OUTOF10: 8
PAINLEVEL_OUTOF10: 7

## 2019-03-20 ASSESSMENT — PAIN DESCRIPTION - DESCRIPTORS: DESCRIPTORS: CONSTANT

## 2019-03-20 ASSESSMENT — PAIN DESCRIPTION - LOCATION: LOCATION: ABDOMEN

## 2019-03-20 ASSESSMENT — PAIN DESCRIPTION - PAIN TYPE: TYPE: ACUTE PAIN

## 2019-03-21 LAB
ABO/RH: NORMAL
ANTIBODY DATA: NORMAL
ANTIBODY SCREEN: POSITIVE
COMPONENT: NORMAL
COMPONENT: NORMAL
CROSSMATCH RESULT: NORMAL
CROSSMATCH RESULT: NORMAL
STATUS: NORMAL
STATUS: NORMAL
TRANSFUSION STATUS: NORMAL
TRANSFUSION STATUS: NORMAL
UNIT DIVISION: 0
UNIT DIVISION: 0
UNIT NUMBER: NORMAL
UNIT NUMBER: NORMAL

## 2019-03-31 ENCOUNTER — APPOINTMENT (OUTPATIENT)
Dept: CT IMAGING | Age: 49
DRG: 392 | End: 2019-03-31
Payer: COMMERCIAL

## 2019-03-31 ENCOUNTER — HOSPITAL ENCOUNTER (INPATIENT)
Age: 49
LOS: 4 days | Discharge: HOME HEALTH | DRG: 392 | End: 2019-04-04
Attending: EMERGENCY MEDICINE | Admitting: HOSPITALIST
Payer: COMMERCIAL

## 2019-03-31 DIAGNOSIS — G89.18 POSTOPERATIVE ABDOMINAL PAIN: ICD-10-CM

## 2019-03-31 DIAGNOSIS — R10.9 POSTOPERATIVE ABDOMINAL PAIN: ICD-10-CM

## 2019-03-31 DIAGNOSIS — R19.7 NAUSEA VOMITING AND DIARRHEA: ICD-10-CM

## 2019-03-31 DIAGNOSIS — R56.9 SEIZURE-LIKE ACTIVITY (HCC): Primary | ICD-10-CM

## 2019-03-31 DIAGNOSIS — R11.2 NAUSEA VOMITING AND DIARRHEA: ICD-10-CM

## 2019-03-31 PROBLEM — G40.409 TONIC-CLONIC SEIZURES (HCC): Status: ACTIVE | Noted: 2019-03-31

## 2019-03-31 LAB
ALBUMIN SERPL-MCNC: 4.1 GM/DL (ref 3.4–5)
ALP BLD-CCNC: 115 IU/L (ref 40–129)
ALT SERPL-CCNC: 47 U/L (ref 10–40)
ANION GAP SERPL CALCULATED.3IONS-SCNC: 9 MMOL/L (ref 4–16)
AST SERPL-CCNC: 39 IU/L (ref 15–37)
BACTERIA: NEGATIVE /HPF
BASOPHILS ABSOLUTE: 0 K/CU MM
BASOPHILS RELATIVE PERCENT: 0.7 % (ref 0–1)
BILIRUB SERPL-MCNC: 0.2 MG/DL (ref 0–1)
BILIRUBIN URINE: NEGATIVE MG/DL
BLOOD, URINE: NEGATIVE
BUN BLDV-MCNC: 15 MG/DL (ref 6–23)
CALCIUM SERPL-MCNC: 9.7 MG/DL (ref 8.3–10.6)
CHLORIDE BLD-SCNC: 106 MMOL/L (ref 99–110)
CLARITY: CLEAR
CO2: 26 MMOL/L (ref 21–32)
COLOR: YELLOW
CREAT SERPL-MCNC: 0.7 MG/DL (ref 0.6–1.1)
DIFFERENTIAL TYPE: ABNORMAL
EOSINOPHILS ABSOLUTE: 0.4 K/CU MM
EOSINOPHILS RELATIVE PERCENT: 9.2 % (ref 0–3)
GFR AFRICAN AMERICAN: >60 ML/MIN/1.73M2
GFR NON-AFRICAN AMERICAN: >60 ML/MIN/1.73M2
GLUCOSE BLD-MCNC: 111 MG/DL (ref 70–99)
GLUCOSE, URINE: NEGATIVE MG/DL
HCT VFR BLD CALC: 34.4 % (ref 37–47)
HEMOGLOBIN: 10.5 GM/DL (ref 12.5–16)
IMMATURE NEUTROPHIL %: 0 % (ref 0–0.43)
KETONES, URINE: NEGATIVE MG/DL
LACTATE: 1.5 MMOL/L (ref 0.4–2)
LACTATE: ABNORMAL MMOL/L (ref 0.4–2)
LEUKOCYTE ESTERASE, URINE: NEGATIVE
LIPASE: 19 IU/L (ref 13–60)
LYMPHOCYTES ABSOLUTE: 1.6 K/CU MM
LYMPHOCYTES RELATIVE PERCENT: 39.3 % (ref 24–44)
MAGNESIUM: 2 MG/DL (ref 1.8–2.4)
MCH RBC QN AUTO: 25.7 PG (ref 27–31)
MCHC RBC AUTO-ENTMCNC: 30.5 % (ref 32–36)
MCV RBC AUTO: 84.3 FL (ref 78–100)
MONOCYTES ABSOLUTE: 0.3 K/CU MM
MONOCYTES RELATIVE PERCENT: 7.5 % (ref 0–4)
MUCUS: ABNORMAL HPF
NITRITE URINE, QUANTITATIVE: NEGATIVE
NUCLEATED RBC %: 0 %
PDW BLD-RTO: 14 % (ref 11.7–14.9)
PH, URINE: 5 (ref 5–8)
PLATELET # BLD: 215 K/CU MM (ref 140–440)
PMV BLD AUTO: 10.8 FL (ref 7.5–11.1)
POTASSIUM SERPL-SCNC: 4.7 MMOL/L (ref 3.5–5.1)
PROLACTIN: 24.6 NG/ML
PROTEIN UA: NEGATIVE MG/DL
RBC # BLD: 4.08 M/CU MM (ref 4.2–5.4)
RBC URINE: ABNORMAL /HPF (ref 0–6)
SEGMENTED NEUTROPHILS ABSOLUTE COUNT: 1.8 K/CU MM
SEGMENTED NEUTROPHILS RELATIVE PERCENT: 43.3 % (ref 36–66)
SODIUM BLD-SCNC: 141 MMOL/L (ref 135–145)
SPECIFIC GRAVITY UA: 1.02 (ref 1–1.03)
SQUAMOUS EPITHELIAL: 11 /HPF
TOTAL IMMATURE NEUTOROPHIL: 0 K/CU MM
TOTAL NUCLEATED RBC: 0 K/CU MM
TOTAL PROTEIN: 6.9 GM/DL (ref 6.4–8.2)
TRICHOMONAS: ABNORMAL /HPF
UROBILINOGEN, URINE: NORMAL MG/DL (ref 0.2–1)
WBC # BLD: 4.1 K/CU MM (ref 4–10.5)
WBC UA: 2 /HPF (ref 0–5)

## 2019-03-31 PROCEDURE — 96372 THER/PROPH/DIAG INJ SC/IM: CPT

## 2019-03-31 PROCEDURE — 96365 THER/PROPH/DIAG IV INF INIT: CPT

## 2019-03-31 PROCEDURE — 86870 RBC ANTIBODY IDENTIFICATION: CPT

## 2019-03-31 PROCEDURE — 83735 ASSAY OF MAGNESIUM: CPT

## 2019-03-31 PROCEDURE — 86901 BLOOD TYPING SEROLOGIC RH(D): CPT

## 2019-03-31 PROCEDURE — 81001 URINALYSIS AUTO W/SCOPE: CPT

## 2019-03-31 PROCEDURE — 86922 COMPATIBILITY TEST ANTIGLOB: CPT

## 2019-03-31 PROCEDURE — 84146 ASSAY OF PROLACTIN: CPT

## 2019-03-31 PROCEDURE — 6370000000 HC RX 637 (ALT 250 FOR IP): Performed by: PHYSICIAN ASSISTANT

## 2019-03-31 PROCEDURE — 6360000004 HC RX CONTRAST MEDICATION: Performed by: PHYSICIAN ASSISTANT

## 2019-03-31 PROCEDURE — 86900 BLOOD TYPING SEROLOGIC ABO: CPT

## 2019-03-31 PROCEDURE — 70450 CT HEAD/BRAIN W/O DYE: CPT

## 2019-03-31 PROCEDURE — 96375 TX/PRO/DX INJ NEW DRUG ADDON: CPT

## 2019-03-31 PROCEDURE — 83605 ASSAY OF LACTIC ACID: CPT

## 2019-03-31 PROCEDURE — 99285 EMERGENCY DEPT VISIT HI MDM: CPT

## 2019-03-31 PROCEDURE — 74177 CT ABD & PELVIS W/CONTRAST: CPT

## 2019-03-31 PROCEDURE — 96361 HYDRATE IV INFUSION ADD-ON: CPT

## 2019-03-31 PROCEDURE — 6360000002 HC RX W HCPCS: Performed by: PHYSICIAN ASSISTANT

## 2019-03-31 PROCEDURE — 80053 COMPREHEN METABOLIC PANEL: CPT

## 2019-03-31 PROCEDURE — 86850 RBC ANTIBODY SCREEN: CPT

## 2019-03-31 PROCEDURE — 6360000002 HC RX W HCPCS

## 2019-03-31 PROCEDURE — 85025 COMPLETE CBC W/AUTO DIFF WBC: CPT

## 2019-03-31 PROCEDURE — 2580000003 HC RX 258: Performed by: PHYSICIAN ASSISTANT

## 2019-03-31 PROCEDURE — 83690 ASSAY OF LIPASE: CPT

## 2019-03-31 PROCEDURE — 2060000000 HC ICU INTERMEDIATE R&B

## 2019-03-31 RX ORDER — LORAZEPAM 2 MG/ML
2 INJECTION INTRAMUSCULAR ONCE
Status: COMPLETED | OUTPATIENT
Start: 2019-03-31 | End: 2019-03-31

## 2019-03-31 RX ORDER — MORPHINE SULFATE 4 MG/ML
4 INJECTION, SOLUTION INTRAMUSCULAR; INTRAVENOUS ONCE
Status: COMPLETED | OUTPATIENT
Start: 2019-03-31 | End: 2019-03-31

## 2019-03-31 RX ORDER — DICYCLOMINE HCL 20 MG
20 TABLET ORAL EVERY 6 HOURS
COMMUNITY
End: 2020-07-02

## 2019-03-31 RX ORDER — CLONIDINE HYDROCHLORIDE 0.1 MG/1
0.1 TABLET ORAL 2 TIMES DAILY
Status: ON HOLD | COMMUNITY
End: 2019-10-03 | Stop reason: HOSPADM

## 2019-03-31 RX ORDER — 0.9 % SODIUM CHLORIDE 0.9 %
1000 INTRAVENOUS SOLUTION INTRAVENOUS ONCE
Status: COMPLETED | OUTPATIENT
Start: 2019-03-31 | End: 2019-03-31

## 2019-03-31 RX ORDER — ESTRADIOL 0.5 MG/1
0.5 TABLET ORAL DAILY
COMMUNITY
End: 2021-05-12 | Stop reason: SDUPTHER

## 2019-03-31 RX ORDER — ONDANSETRON 2 MG/ML
4 INJECTION INTRAMUSCULAR; INTRAVENOUS ONCE
Status: COMPLETED | OUTPATIENT
Start: 2019-03-31 | End: 2019-03-31

## 2019-03-31 RX ORDER — ATENOLOL 25 MG/1
25 TABLET ORAL DAILY
COMMUNITY
End: 2021-05-12 | Stop reason: SDUPTHER

## 2019-03-31 RX ORDER — TIZANIDINE 4 MG/1
4 TABLET ORAL 2 TIMES DAILY PRN
Status: ON HOLD | COMMUNITY
End: 2019-04-04 | Stop reason: HOSPADM

## 2019-03-31 RX ORDER — PROMETHAZINE HYDROCHLORIDE 25 MG/1
25 TABLET ORAL EVERY 6 HOURS PRN
COMMUNITY
End: 2019-04-17 | Stop reason: SDUPTHER

## 2019-03-31 RX ORDER — LORAZEPAM 2 MG/ML
INJECTION INTRAMUSCULAR
Status: COMPLETED
Start: 2019-03-31 | End: 2019-03-31

## 2019-03-31 RX ORDER — ONDANSETRON 4 MG/1
4 TABLET, FILM COATED ORAL EVERY 8 HOURS PRN
COMMUNITY
End: 2019-07-06

## 2019-03-31 RX ORDER — HYDROCODONE BITARTRATE AND ACETAMINOPHEN 5; 325 MG/1; MG/1
2 TABLET ORAL ONCE
Status: COMPLETED | OUTPATIENT
Start: 2019-03-31 | End: 2019-03-31

## 2019-03-31 RX ORDER — HYDROCODONE BITARTRATE AND ACETAMINOPHEN 7.5; 325 MG/1; MG/1
1 TABLET ORAL EVERY 6 HOURS PRN
Status: ON HOLD | COMMUNITY
End: 2019-09-29 | Stop reason: ALTCHOICE

## 2019-03-31 RX ORDER — LORAZEPAM 1 MG/1
1 TABLET ORAL NIGHTLY
Status: ON HOLD | COMMUNITY
End: 2019-04-01

## 2019-03-31 RX ORDER — PROMETHAZINE HYDROCHLORIDE 25 MG/ML
25 INJECTION, SOLUTION INTRAMUSCULAR; INTRAVENOUS ONCE
Status: COMPLETED | OUTPATIENT
Start: 2019-03-31 | End: 2019-03-31

## 2019-03-31 RX ORDER — HYOSCYAMINE SULFATE 0.5 MG/ML
250 INJECTION, SOLUTION SUBCUTANEOUS ONCE
Status: DISCONTINUED | OUTPATIENT
Start: 2019-03-31 | End: 2019-03-31

## 2019-03-31 RX ADMIN — HYDROCODONE BITARTRATE AND ACETAMINOPHEN 2 TABLET: 5; 325 TABLET ORAL at 23:15

## 2019-03-31 RX ADMIN — HYOSCYAMINE SULFATE 125 MCG: 0.12 TABLET ORAL; SUBLINGUAL at 20:38

## 2019-03-31 RX ADMIN — ONDANSETRON 4 MG: 2 INJECTION INTRAMUSCULAR; INTRAVENOUS at 20:07

## 2019-03-31 RX ADMIN — LORAZEPAM 2 MG: 2 INJECTION INTRAMUSCULAR; INTRAVENOUS at 20:59

## 2019-03-31 RX ADMIN — SODIUM CHLORIDE 1000 ML: 9 INJECTION, SOLUTION INTRAVENOUS at 22:10

## 2019-03-31 RX ADMIN — MORPHINE SULFATE 4 MG: 4 INJECTION INTRAVENOUS at 20:06

## 2019-03-31 RX ADMIN — IOPAMIDOL 75 ML: 755 INJECTION, SOLUTION INTRAVENOUS at 20:35

## 2019-03-31 RX ADMIN — PROMETHAZINE HYDROCHLORIDE 25 MG: 25 INJECTION INTRAMUSCULAR; INTRAVENOUS at 23:14

## 2019-03-31 RX ADMIN — LORAZEPAM 2 MG: 2 INJECTION INTRAMUSCULAR at 20:59

## 2019-03-31 RX ADMIN — LEVETIRACETAM 1000 MG: 100 INJECTION, SOLUTION INTRAVENOUS at 22:37

## 2019-03-31 ASSESSMENT — PAIN DESCRIPTION - LOCATION: LOCATION: ABDOMEN

## 2019-03-31 ASSESSMENT — PAIN SCALES - GENERAL
PAINLEVEL_OUTOF10: 7

## 2019-03-31 ASSESSMENT — PAIN DESCRIPTION - DESCRIPTORS: DESCRIPTORS: SHARP;CRAMPING

## 2019-03-31 ASSESSMENT — PAIN DESCRIPTION - PAIN TYPE: TYPE: ACUTE PAIN

## 2019-03-31 NOTE — ED NOTES
Pt has mediport that is already accessed; pt states that she has a home health care nurse that comes and gives the pt infusions thru the mediport; pt does states that mediport is one that contrast can be given thru and that some times it takes a couple of flushes before it will draw blood from it; Yanira Polo, [facility]  03/31/19 0176

## 2019-03-31 NOTE — ED NOTES
[facility] work [facility] from [employer] [facility]  03/31/19 1944

## 2019-03-31 NOTE — ED PROVIDER NOTES
eMERGENCY dEPARTMENT eNCOUnter      PCP: No primary care provider on file. CHIEF COMPLAINT    Chief Complaint   Patient presents with    Abdominal Pain     hernia repair and gastric sleeve performed in mid February by Dr. Caleb Wooten; reports abdominal distention; reports pain intermittent but has been constant the past few days    Emesis     reports vomiting blood last night    Nausea       ONEAL Bailey is a 46 y.o. female who presents with abdominal pain, distention, vomiting, diarrhea. Patient did have hernia repair and gastric sleeve mid February with Dr. Caleb Wooten.  Patient has presented to the ED for times including today since surgery. She was seen and admitted on 10 March for almost identical symptoms she presents with today. She had an abdominal wall abscess at that time and was being treated with antibiotics. She also had a normal EGD during that admission. Patient seen again on the 16th of the 20th and had CAT scans out of the ED. Was discharged each visit. No acute findings in most recent visits. Patient was post follow-up with her surgeon but states that she has not been seen in the office yet. Reports that her surgeon was out of town until Wednesday. She did speak to a PA over the phone again has not been seen in the office after any of her ED visits. Patient does have home health and does have port accessed does receive IV fluids however she has not received any fluids from home health since Tuesday. She reports that her symptoms exacerbated Wednesday night into Thursday. Reports multiple episodes of vomiting and diarrhea. Reports last night she had a streaky hematemesis but denies any fernando blood. No other hematemesis noted. No blood in the stools. She reports multiple episodes of yellow diarrhea. Reports abdomen feels harder than normal over last couple days.        REVIEW OF SYSTEMS    Constitutional:  Denies fever, chills, weight loss or weakness   HENT:  Denies sore throat or ear pain   Cardiovascular:  Denies chest pain, palpitations or swelling   Respiratory:  Denies cough or shortness of breath   GI:  See HPI above  : No hematuria or dysuria. No vaginal symptoms. Musculoskeletal:  Denies back pain or groin pain or masses. No pain or swelling of extremities. Skin:  Denies rash  Neurologic:  Denies headache, focal weakness or sensory changes   Endocrine:  Denies polyuria or polydypsia   Lymphatic:  Denies swollen glands     All other review of systems are negative  See HPI and nursing notes for additional information     PAST MEDICAL & SURGICAL HISTORY    Past Medical History:   Diagnosis Date    Acid reflux     Anemia     Anxiety     Arthritis     Hands, Back And Ankles    Bleeding ulcer 2014    \"I Had Ulcers In My Stomach And Colon\"    Bronchitis Last Episode 2014    Chronic back pain     Chronic [facility]     [facility]    COPD [facility] disease) [facility]     Sees Dr. Palacios Precise Depression     Fibromyalgia Dx 2013    H/O echocardiogram 8/11/15    EF >55%. LA to be at the upper limit of normal in size. LV hypertrophy with normal LV systolic, but abnormal diastolic function. Normal valvular structures and function.  H/O echocardiogram 08/30/2018    EF 55-60%    Hiatal hernia     History of blood transfusion 09/2015 And 2018    No Reaction To Blood Transfusions Received    [place] (hard of hearing)     Right Ear    Hx of cardiac catheterization 10/24/2010    Angiographically normal coronary arteries w/ normal LV function and wall motion.  Hx of transesophageal echocardiography (PILO) for monitoring 12/2/2010    EF 55-60%. WNL.     Hypertension     Lupus Dx 2013    \"Rheumatoid Lupus\"    Morbid obesity ([facility]     Nausea     \"Most Of The Time\"    Pain management     Sees Dr. Bright Later, Once A Month    Pancreatitis chronic Dx 2001    Pneumonia Dx 11-15    Shortness of breath on exertion     Shortness of breath on exertion     Sleep apnea     Has CPAP    Staph infection Dx 11-15    Left Foot    Staph infection Dx 11-15    \"Left Foot\"    Teeth missing     Upper And Lower    Thyroid disease     UTI (urinary tract infection) In Past    No Current Symptoms    Wears glasses     To Read     Past Surgical History:   Procedure Laterality Date    APPENDECTOMY  1998    Done When Tubes And Ovaries Were Removed    CARDIAC CATHETERIZATION  10/24/2010     SECTION  1991    CHOLECYSTECTOMY, LAPAROSCOPIC      COLONOSCOPY  Last Done In     DENTAL SURGERY      Teeth Extracted In Past    ENDOSCOPY, COLON, DIAGNOSTIC  Last Done In     FOOT SURGERY Left Last Done In      Dr. Ludy Saunders, \" About 6 Surgeries Done Because Of Staph Infection\"    HIATAL HERNIA REPAIR N/A 2019    HERNIA HIATAL LAPAROSCOPIC ROBOTIC performed by Sharon Thomas MD at [address]  10/1997    Partial Abdominal Hysterectomy    LAP BAND  ~    removed after 6 months    LUNG REMOVAL, PARTIAL Left     Benign    OTHER SURGICAL HISTORY  1998    \"Tubes And Ovaries Removed, Appendectomy Also Done\"    OTHER SURGICAL HISTORY  Last Done 7-15-16    Mediport Insertion \"Total Of Six Done, Removed Last Mediport In \"    SLEEVE GASTRECTOMY N/A 2019    GASTRECTOMY SLEEVE LAPAROSCOPIC ROBOTIC performed by Sharon Thomas MD at [address] [place] [address] ENDOSCOPY  2018       CURRENT MEDICATIONS    Current Outpatient Rx   Medication Sig Dispense Refill    Multiple Vitamin (MVI, BARIATRIC ADVANTAGE MULTI-FORMULA, CHEW TAB) Take 1 tablet by mouth daily 30 tablet 5    Calcium Citrate-Vitamin D (CALCIUM + VIT D, BARIATRIC ADVANTAGE, CHEWABLE TABLET) Take 1 tablet by mouth 2 times daily 120 tablet 5    pantoprazole (PROTONIX) 40 MG tablet Take 1 tablet by mouth 2 times daily 60 tablet 3    Nutritional Supplements (ENSURE HIGH PROTEIN) LIQD Take 1 Can by mouth 4 times daily 2 week pre op diet. No other foods.  120 Can 0    albuterol sulfate HFA (VENTOLIN HFA) 108 (90 Base) MCG/ACT inhaler Inhale 2 puffs into the lungs every 6 hours as needed for Wheezing 1 Inhaler 3    levothyroxine (SYNTHROID) 125 MCG tablet Take 1 tablet by mouth Daily (Patient taking differently: Take 125 mcg by mouth nightly ) 30 tablet 0    gabapentin (NEURONTIN) 800 MG tablet Take 800 mg by mouth 3 times daily      PARoxetine (PAXIL) 30 MG tablet Take 30 mg by mouth nightly          ALLERGIES    Allergies   Allergen Reactions    Aspirin Palpitations     \"My Heart Rate Elevates\"    Compazine [Prochlorperazine Maleate] Rash    Reglan [Metoclopramide Hcl] Rash    Shellfish-Derived Products Swelling    Toradol [Ketorolac Tromethamine] Rash       SOCIAL AND FAMILY HISTORY    Social History     Socioeconomic History    Marital status:      Spouse name: None    Number of children: None    Years of education: None    Highest education level: None   Occupational History    None   Social Needs    Financial resource strain: None    Food insecurity:     Worry: None     Inability: None    Transportation needs:     Medical: None     Non-medical: None   Tobacco Use    Smoking status: Never Smoker    Smokeless tobacco: Never Used   Substance and Sexual Activity    Alcohol use: No     Alcohol/week: 0.0 oz    Drug use: No    Sexual activity: Never   Lifestyle    Physical activity:     Days per week: None     Minutes per session: None    Stress: None   Relationships    Social connections:     Talks on phone: None     Gets together: None     Attends Mu-ism service: None     Active member of club or organization: None     Attends meetings of clubs or organizations: None     Relationship status: None    Intimate partner violence:     Fear of current or ex partner: None     Emotionally abused: None     Physically abused: None     Forced sexual activity: None   Other Topics Concern    None   Social History Narrative    None     Family History   Problem Relation Age of Onset    Diabetes Mother         \"Borderline Diabetes\"    High Blood Pressure Mother     Obesity Mother     Arthritis Mother     Heart Disease Mother     High Cholesterol Mother     Vision Loss Mother     Diabetes Father     High Blood Pressure Father     Asthma Father     High Blood Pressure Brother     Asthma Son     Vision Loss Son     Lupus Daughter     Other Daughter         \"Alot Of Female Problems\"       PHYSICAL EXAM    VITAL SIGNS: BP (!) 157/92   Pulse 88   Temp 98.5 °F (36.9 °C) (Oral)   Resp 18   Ht 5' 4\" (1.626 m)   Wt 290 lb (131.5 kg)   SpO2 98%   BMI 49.78 kg/m²   Constitutional:  Well developed, well nourished. No distress  Eyes:  Sclera nonicteric, conjunctiva moist  HENT:  Atraumatic. PERRL. EOMI.  moist mucus membranes. Neck/Lymphatics: supple, no JVD, no swollen nodes  Respiratory:  No retractions, no accessory muscle use, normal breath sounds   Cardiovascular:   normal rate, normal rhythm, no murmurs    GI:     No gross discoloration.       -no Redding's sign (periumbilical ecchymosis)       -no Grey-Armstrong's sign (flank ecchymosis)  (necrosis/hemmorrhage may cause subcutaneous blood leakage)    Bowel sounds present, no audible bruits. Soft,  No distention, no guarding, no rigidity,   + generalized abdominal tenderness, no rebound, no palpable pulsatile masses,   No McBurney's point tenderness   Negative Rovsing sign    Negative Champion's sign. Back:   No CVA tenderness to percussion.   Musculoskeletal:  No edema, no deformity  Vascular: DP pulses 2+ equal bilaterally  Integument: No rash, dry skin  Neurologic:  Alert & oriented, normal speech  Psychiatric: Cooperative, pleasant affect       LABS:  Results for orders placed or performed during the hospital encounter of 03/31/19   CBC auto diff   Result Value Ref Range    WBC 4.1 4.0 - 10.5 K/CU MM    RBC 4.08 (L) 4.2 - 5.4 M/CU MM    Hemoglobin 10.5 (L) 12.5 - 16.0 GM/DL    Hematocrit 34.4 (L) 37 - 47 %    MCV 84.3 78 - 100 FL    MCH 25.7 (L) 27 - 31 PG    MCHC 30.5 (L) 32.0 - 36.0 %    RDW 14.0 11.7 - 14.9 %    Platelets 544 195 - 058 K/CU MM    MPV 10.8 7.5 - 11.1 FL    Differential Type AUTOMATED DIFFERENTIAL     Segs Relative 43.3 36 - 66 %    Lymphocytes % 39.3 24 - 44 %    Monocytes % 7.5 (H) 0 - 4 %    Eosinophils % 9.2 (H) 0 - 3 %    Basophils % 0.7 0 - 1 %    Segs Absolute 1.8 K/CU MM    Lymphocytes # 1.6 K/CU MM    Monocytes # 0.3 K/CU MM    Eosinophils # 0.4 K/CU MM    Basophils # 0.0 K/CU MM    Nucleated RBC % 0.0 %    Total Nucleated RBC 0.0 K/CU MM    Total Immature Neutrophil 0.00 K/CU MM    Immature Neutrophil % 0.0 0 - 0.43 %   CMP   Result Value Ref Range    Sodium 141 135 - 145 MMOL/L    Potassium 4.7 3.5 - 5.1 MMOL/L    Chloride 106 99 - 110 mMol/L    CO2 26 21 - 32 MMOL/L    BUN 15 6 - 23 MG/DL    CREATININE 0.7 0.6 - 1.1 MG/DL    Glucose 111 (H) 70 - 99 MG/DL    Calcium 9.7 8.3 - 10.6 MG/DL    Alb 4.1 3.4 - 5.0 GM/DL    Total Protein 6.9 6.4 - 8.2 GM/DL    Total Bilirubin 0.2 0.0 - 1.0 MG/DL    ALT 47 (H) 10 - 40 U/L    AST 39 (H) 15 - 37 IU/L    Alkaline Phosphatase 115 40 - 129 IU/L    GFR Non-African American >60 >60 mL/min/1.73m2    GFR African American >60 >60 mL/min/1.73m2    Anion Gap 9 4 - 16   Lipase   Result Value Ref Range    Lipase 19 13 - 60 IU/L   Urinalysis   Result Value Ref Range    Color, UA YELLOW YELLOW    Clarity, UA CLEAR CLEAR    Glucose, Urine NEGATIVE NEGATIVE MG/DL    Bilirubin Urine NEGATIVE NEGATIVE MG/DL    Ketones, Urine NEGATIVE NEGATIVE MG/DL    Specific Gravity, UA 1.017 1.001 - 1.035    Blood, Urine NEGATIVE NEGATIVE    pH, Urine 5.0 5.0 - 8.0    Protein, UA NEGATIVE NEGATIVE MG/DL    Urobilinogen, Urine NORMAL 0.2 - 1.0 MG/DL    Nitrite Urine, Quantitative NEGATIVE NEGATIVE    Leukocyte Esterase, Urine NEGATIVE NEGATIVE    RBC, UA NONE SEEN 0 - 6 /HPF    WBC, UA 2 0 - 5 no midline shift or  hemorrhage. Savanah Betsy is no extra-axial collection    ORBITS: The visualized portion of the orbits demonstrate no acute abnormality. SINUSES: The visualized paranasal sinuses and mastoid air cells demonstrate  no acute abnormality. SOFT TISSUES/SKULL:  No acute abnormality of the visualized skull or soft  tissues.                      CT ABDOMEN PELVIS W IV CONTRAST Additional Contrast? None (Final result)   Result time 03/31/19 20:38:51   Final result by Sam Bauman MD (03/31/19 20:38:51)                Impression:    No acute abnormality in the abdomen or pelvis. Narrative:    EXAMINATION:  CT OF THE ABDOMEN AND PELVIS WITH CONTRAST 3/31/2019 8:31 pm    TECHNIQUE:  CT of the abdomen and pelvis was performed with the administration of  intravenous contrast. Multiplanar reformatted images are provided for review. Dose modulation, iterative reconstruction, and/or weight based adjustment of  the mA/kV was utilized to reduce the radiation dose to as low as reasonably  achievable. COMPARISON:  CT abdomen and pelvis 03/20/2019. HISTORY:  ORDERING SYSTEM PROVIDED HISTORY: abd pain, recent bariatric surgery,  worsening pain, v/d  TECHNOLOGIST PROVIDED HISTORY:  Additional Contrast?->None    Initial encounter. FINDINGS:  Lower Chest: Mild bibasilar atelectasis versus scarring.  No acute  abnormality in the lung bases. Organs: No focal hepatic abnormality.  Status post cholecystectomy. Unchanged pneumobilia.  No biliary ductal dilatation.  The pancreas, spleen,  and bilateral adrenal glands are unremarkable.  The right kidney is  unremarkable.  9 mm hypodensity in the lower pole the left kidney too small  to definitively characterize but likely to represent a benign cyst.  No  obstructive uropathy. GI/Bowel: No evidence of acute appendicitis.  The colon is unremarkable. Status post sleeve gastrectomy.  No bowel obstruction or abnormal bowel wall  thickening.     Pelvis: The urinary bladder is normal in appearance.  Status post  hysterectomy.  No free fluid.  No pelvic or inguinal lymphadenopathy. Peritoneum/Retroperitoneum: The abdominal aorta is normal in appearance.  No  retroperitoneal or mesenteric lymphadenopathy is identified.  No free air or  fluid is seen in the abdomen. Bones/Soft Tissues: Postoperative scarring of the anterior abdominal wall. No acute osseous or soft tissue abnormality.                    ED COURSE & MEDICAL DECISION MAKING       Vital signs and nursing notes reviewed during ED course. Patient care and presentation staffed with supervising MD.   Patient seen by supervising MD today- see his/her note for details of the encounter. All pertinent Lab data and radiographic results reviewed with patient at bedside. The patient and/or the family were informed of the results of any tests/labs/imaging, the treatment plan, and time was allotted to answer questions. Differential diagnosis: Abdominal Aortic Aneurysm, Ischemic Bowel, Bowel Obstruction, Acute Cholecystitis, Acute Appendicitis, other    Clinical  IMPRESSION    1. Seizure-like activity ([address]    2. Postoperative abdominal pain    3. Nausea vomiting and diarrhea      Patient presents as above. Her abdominal pain vomiting diarrhea is what brought her in therefore workup was initiated for this. No significant findings and labs and CT ultimately was negative. Patient continued to rest, speaking medication here. While here patient had some seizure-like activity. Patient called out to nursing staff for some beeping monitors and while nursing staff was in the room patient began to have a seizure. She did have Ativan ordered by attending however I did not witness this episode. There is some question if this was pseudoseizures or some malingering component. I question is because patient had told me on initial valuation that she did not feel comfortable going home.   Patient allegedly had

## 2019-04-01 PROBLEM — F44.5 PSEUDOSEIZURE: Status: ACTIVE | Noted: 2019-03-31

## 2019-04-01 PROBLEM — Z76.5 DRUG-SEEKING BEHAVIOR: Status: ACTIVE | Noted: 2019-04-01

## 2019-04-01 PROBLEM — G89.4 CHRONIC PAIN SYNDROME: Status: ACTIVE | Noted: 2019-04-01

## 2019-04-01 LAB
ANION GAP SERPL CALCULATED.3IONS-SCNC: 10 MMOL/L (ref 4–16)
BUN BLDV-MCNC: 11 MG/DL (ref 6–23)
CALCIUM SERPL-MCNC: 9 MG/DL (ref 8.3–10.6)
CHLORIDE BLD-SCNC: 109 MMOL/L (ref 99–110)
CO2: 21 MMOL/L (ref 21–32)
CREAT SERPL-MCNC: 0.6 MG/DL (ref 0.6–1.1)
GFR AFRICAN AMERICAN: >60 ML/MIN/1.73M2
GFR NON-AFRICAN AMERICAN: >60 ML/MIN/1.73M2
GLUCOSE BLD-MCNC: 102 MG/DL (ref 70–99)
GLUCOSE BLD-MCNC: 98 MG/DL (ref 70–99)
HCT VFR BLD CALC: 34.4 % (ref 37–47)
HEMOGLOBIN: 9.5 GM/DL (ref 12.5–16)
LACTATE: 0.9 MMOL/L (ref 0.4–2)
MCH RBC QN AUTO: 25.3 PG (ref 27–31)
MCHC RBC AUTO-ENTMCNC: 27.6 % (ref 32–36)
MCV RBC AUTO: 91.7 FL (ref 78–100)
PDW BLD-RTO: 14 % (ref 11.7–14.9)
PLATELET # BLD: 159 K/CU MM (ref 140–440)
PMV BLD AUTO: 10.7 FL (ref 7.5–11.1)
POTASSIUM SERPL-SCNC: 4.3 MMOL/L (ref 3.5–5.1)
RBC # BLD: 3.75 M/CU MM (ref 4.2–5.4)
SODIUM BLD-SCNC: 140 MMOL/L (ref 135–145)
WBC # BLD: 3.1 K/CU MM (ref 4–10.5)

## 2019-04-01 PROCEDURE — 6370000000 HC RX 637 (ALT 250 FOR IP): Performed by: HOSPITALIST

## 2019-04-01 PROCEDURE — 6360000002 HC RX W HCPCS: Performed by: STUDENT IN AN ORGANIZED HEALTH CARE EDUCATION/TRAINING PROGRAM

## 2019-04-01 PROCEDURE — 6360000002 HC RX W HCPCS: Performed by: NURSE PRACTITIONER

## 2019-04-01 PROCEDURE — 83605 ASSAY OF LACTIC ACID: CPT

## 2019-04-01 PROCEDURE — C9113 INJ PANTOPRAZOLE SODIUM, VIA: HCPCS | Performed by: HOSPITALIST

## 2019-04-01 PROCEDURE — 80048 BASIC METABOLIC PNL TOTAL CA: CPT

## 2019-04-01 PROCEDURE — 85027 COMPLETE CBC AUTOMATED: CPT

## 2019-04-01 PROCEDURE — 94761 N-INVAS EAR/PLS OXIMETRY MLT: CPT

## 2019-04-01 PROCEDURE — 82962 GLUCOSE BLOOD TEST: CPT

## 2019-04-01 PROCEDURE — 99221 1ST HOSP IP/OBS SF/LOW 40: CPT | Performed by: SURGERY

## 2019-04-01 PROCEDURE — 6360000002 HC RX W HCPCS: Performed by: HOSPITALIST

## 2019-04-01 PROCEDURE — 2060000000 HC ICU INTERMEDIATE R&B

## 2019-04-01 PROCEDURE — 6370000000 HC RX 637 (ALT 250 FOR IP): Performed by: STUDENT IN AN ORGANIZED HEALTH CARE EDUCATION/TRAINING PROGRAM

## 2019-04-01 PROCEDURE — 2580000003 HC RX 258: Performed by: HOSPITALIST

## 2019-04-01 RX ORDER — HYDROCODONE BITARTRATE AND ACETAMINOPHEN 7.5; 325 MG/1; MG/1
1 TABLET ORAL EVERY 8 HOURS PRN
Status: DISCONTINUED | OUTPATIENT
Start: 2019-04-01 | End: 2019-04-01

## 2019-04-01 RX ORDER — ATENOLOL 25 MG/1
25 TABLET ORAL DAILY
Status: DISCONTINUED | OUTPATIENT
Start: 2019-04-01 | End: 2019-04-04 | Stop reason: HOSPADM

## 2019-04-01 RX ORDER — FEEDER CONTAINER WITH PUMP SET
1 EACH MISCELLANEOUS 4 TIMES DAILY
Status: DISCONTINUED | OUTPATIENT
Start: 2019-04-01 | End: 2019-04-01

## 2019-04-01 RX ORDER — HEPARIN SODIUM (PORCINE) LOCK FLUSH IV SOLN 100 UNIT/ML 100 UNIT/ML
500 SOLUTION INTRAVENOUS ONCE
Status: COMPLETED | OUTPATIENT
Start: 2019-04-01 | End: 2019-04-01

## 2019-04-01 RX ORDER — PANTOPRAZOLE SODIUM 40 MG/1
40 TABLET, DELAYED RELEASE ORAL 2 TIMES DAILY
Status: DISCONTINUED | OUTPATIENT
Start: 2019-04-01 | End: 2019-04-01

## 2019-04-01 RX ORDER — PANTOPRAZOLE SODIUM 40 MG/10ML
40 INJECTION, POWDER, LYOPHILIZED, FOR SOLUTION INTRAVENOUS 2 TIMES DAILY
Status: DISCONTINUED | OUTPATIENT
Start: 2019-04-01 | End: 2019-04-02

## 2019-04-01 RX ORDER — MORPHINE SULFATE 4 MG/ML
1 INJECTION, SOLUTION INTRAMUSCULAR; INTRAVENOUS ONCE
Status: COMPLETED | OUTPATIENT
Start: 2019-04-01 | End: 2019-04-01

## 2019-04-01 RX ORDER — ONDANSETRON 4 MG/1
4 TABLET, ORALLY DISINTEGRATING ORAL EVERY 8 HOURS PRN
Status: DISCONTINUED | OUTPATIENT
Start: 2019-04-01 | End: 2019-04-04 | Stop reason: HOSPADM

## 2019-04-01 RX ORDER — ESTRADIOL 1 MG/1
0.5 TABLET ORAL DAILY
Status: DISCONTINUED | OUTPATIENT
Start: 2019-04-01 | End: 2019-04-04 | Stop reason: HOSPADM

## 2019-04-01 RX ORDER — MULTIVIT-MIN/FERROUS GLUCONATE 9 MG/15 ML
15 LIQUID (ML) ORAL DAILY
Status: DISCONTINUED | OUTPATIENT
Start: 2019-04-01 | End: 2019-04-04 | Stop reason: HOSPADM

## 2019-04-01 RX ORDER — LEVOTHYROXINE SODIUM 0.12 MG/1
125 TABLET ORAL DAILY
Status: DISCONTINUED | OUTPATIENT
Start: 2019-04-01 | End: 2019-04-04 | Stop reason: HOSPADM

## 2019-04-01 RX ORDER — LORAZEPAM 1 MG/1
1 TABLET ORAL NIGHTLY
Status: DISCONTINUED | OUTPATIENT
Start: 2019-04-01 | End: 2019-04-01

## 2019-04-01 RX ORDER — PROMETHAZINE HYDROCHLORIDE 25 MG/1
25 TABLET ORAL EVERY 6 HOURS PRN
Status: DISCONTINUED | OUTPATIENT
Start: 2019-04-01 | End: 2019-04-01

## 2019-04-01 RX ORDER — HYDROCODONE BITARTRATE AND ACETAMINOPHEN 7.5; 325 MG/1; MG/1
1 TABLET ORAL EVERY 8 HOURS PRN
Status: DISCONTINUED | OUTPATIENT
Start: 2019-04-01 | End: 2019-04-04 | Stop reason: SDUPTHER

## 2019-04-01 RX ORDER — ALBUTEROL SULFATE 90 UG/1
2 AEROSOL, METERED RESPIRATORY (INHALATION) EVERY 6 HOURS PRN
Status: DISCONTINUED | OUTPATIENT
Start: 2019-04-01 | End: 2019-04-04 | Stop reason: HOSPADM

## 2019-04-01 RX ORDER — PROMETHAZINE HYDROCHLORIDE 25 MG/ML
12.5 INJECTION, SOLUTION INTRAMUSCULAR; INTRAVENOUS EVERY 6 HOURS PRN
Status: DISCONTINUED | OUTPATIENT
Start: 2019-04-01 | End: 2019-04-04 | Stop reason: HOSPADM

## 2019-04-01 RX ORDER — DICYCLOMINE HCL 20 MG
20 TABLET ORAL EVERY 6 HOURS SCHEDULED
Status: DISCONTINUED | OUTPATIENT
Start: 2019-04-01 | End: 2019-04-04 | Stop reason: HOSPADM

## 2019-04-01 RX ORDER — MORPHINE SULFATE 4 MG/ML
2 INJECTION, SOLUTION INTRAMUSCULAR; INTRAVENOUS EVERY 4 HOURS PRN
Status: DISCONTINUED | OUTPATIENT
Start: 2019-04-01 | End: 2019-04-01

## 2019-04-01 RX ORDER — GABAPENTIN 400 MG/1
800 CAPSULE ORAL 3 TIMES DAILY
Status: DISCONTINUED | OUTPATIENT
Start: 2019-04-01 | End: 2019-04-04 | Stop reason: HOSPADM

## 2019-04-01 RX ORDER — CLONIDINE HYDROCHLORIDE 0.1 MG/1
0.1 TABLET ORAL 2 TIMES DAILY
Status: DISCONTINUED | OUTPATIENT
Start: 2019-04-01 | End: 2019-04-04 | Stop reason: HOSPADM

## 2019-04-01 RX ORDER — PROMETHAZINE HYDROCHLORIDE 25 MG/ML
12.5 INJECTION, SOLUTION INTRAMUSCULAR; INTRAVENOUS EVERY 6 HOURS PRN
Status: DISCONTINUED | OUTPATIENT
Start: 2019-04-01 | End: 2019-04-01

## 2019-04-01 RX ORDER — SODIUM CHLORIDE 9 MG/ML
INJECTION, SOLUTION INTRAVENOUS CONTINUOUS
Status: DISCONTINUED | OUTPATIENT
Start: 2019-04-01 | End: 2019-04-04 | Stop reason: HOSPADM

## 2019-04-01 RX ORDER — OYSTER SHELL CALCIUM WITH VITAMIN D 500; 200 MG/1; [IU]/1
1 TABLET, FILM COATED ORAL 2 TIMES DAILY
Status: DISCONTINUED | OUTPATIENT
Start: 2019-04-01 | End: 2019-04-04 | Stop reason: HOSPADM

## 2019-04-01 RX ORDER — LORAZEPAM 2 MG/ML
0.5 INJECTION INTRAMUSCULAR ONCE
Status: COMPLETED | OUTPATIENT
Start: 2019-04-01 | End: 2019-04-01

## 2019-04-01 RX ORDER — TIZANIDINE 4 MG/1
4 TABLET ORAL 2 TIMES DAILY PRN
Status: DISCONTINUED | OUTPATIENT
Start: 2019-04-01 | End: 2019-04-04 | Stop reason: HOSPADM

## 2019-04-01 RX ADMIN — ESTRADIOL 0.5 MG: 1 TABLET ORAL at 10:01

## 2019-04-01 RX ADMIN — MORPHINE SULFATE 2 MG: 4 INJECTION INTRAVENOUS at 06:34

## 2019-04-01 RX ADMIN — HYDROCODONE BITARTRATE AND ACETAMINOPHEN 1 TABLET: 7.5; 325 TABLET ORAL at 13:31

## 2019-04-01 RX ADMIN — PROMETHAZINE HYDROCHLORIDE 12.5 MG: 25 INJECTION INTRAMUSCULAR; INTRAVENOUS at 03:06

## 2019-04-01 RX ADMIN — MULTIVITAMIN 15 ML: LIQUID ORAL at 10:01

## 2019-04-01 RX ADMIN — CLONIDINE HYDROCHLORIDE 0.1 MG: 0.1 TABLET ORAL at 10:01

## 2019-04-01 RX ADMIN — PROMETHAZINE HYDROCHLORIDE 12.5 MG: 25 INJECTION INTRAMUSCULAR; INTRAVENOUS at 18:24

## 2019-04-01 RX ADMIN — GABAPENTIN 800 MG: 400 CAPSULE ORAL at 10:01

## 2019-04-01 RX ADMIN — PANTOPRAZOLE SODIUM 40 MG: 40 INJECTION, POWDER, FOR SOLUTION INTRAVENOUS at 10:02

## 2019-04-01 RX ADMIN — PROMETHAZINE HYDROCHLORIDE 12.5 MG: 25 INJECTION INTRAMUSCULAR; INTRAVENOUS at 10:00

## 2019-04-01 RX ADMIN — DICYCLOMINE HYDROCHLORIDE 20 MG: 20 TABLET ORAL at 13:31

## 2019-04-01 RX ADMIN — MORPHINE SULFATE 2 MG: 4 INJECTION INTRAVENOUS at 01:57

## 2019-04-01 RX ADMIN — ONDANSETRON 4 MG: 4 TABLET, ORALLY DISINTEGRATING ORAL at 13:31

## 2019-04-01 RX ADMIN — MORPHINE SULFATE 1 MG: 4 INJECTION INTRAVENOUS at 23:20

## 2019-04-01 RX ADMIN — DICYCLOMINE HYDROCHLORIDE 20 MG: 20 TABLET ORAL at 17:38

## 2019-04-01 RX ADMIN — SODIUM CHLORIDE 1000 ML: 9 INJECTION, SOLUTION INTRAVENOUS at 14:44

## 2019-04-01 RX ADMIN — LORAZEPAM 0.5 MG: 2 INJECTION INTRAMUSCULAR; INTRAVENOUS at 03:06

## 2019-04-01 RX ADMIN — SODIUM CHLORIDE: 9 INJECTION, SOLUTION INTRAVENOUS at 23:23

## 2019-04-01 RX ADMIN — PANTOPRAZOLE SODIUM 40 MG: 40 INJECTION, POWDER, FOR SOLUTION INTRAVENOUS at 23:19

## 2019-04-01 RX ADMIN — OYSTER SHELL CALCIUM WITH VITAMIN D 1 TABLET: 500; 200 TABLET, FILM COATED ORAL at 10:01

## 2019-04-01 RX ADMIN — SODIUM CHLORIDE: 9 INJECTION, SOLUTION INTRAVENOUS at 01:59

## 2019-04-01 RX ADMIN — SODIUM CHLORIDE, PRESERVATIVE FREE 500 UNITS: 5 INJECTION INTRAVENOUS at 12:31

## 2019-04-01 RX ADMIN — ATENOLOL 25 MG: 25 TABLET ORAL at 10:01

## 2019-04-01 RX ADMIN — GABAPENTIN 800 MG: 400 CAPSULE ORAL at 13:31

## 2019-04-01 ASSESSMENT — PAIN DESCRIPTION - LOCATION
LOCATION: ABDOMEN
LOCATION: ABDOMEN

## 2019-04-01 ASSESSMENT — PAIN SCALES - GENERAL
PAINLEVEL_OUTOF10: 7
PAINLEVEL_OUTOF10: 8
PAINLEVEL_OUTOF10: 8
PAINLEVEL_OUTOF10: 6
PAINLEVEL_OUTOF10: 8
PAINLEVEL_OUTOF10: 0
PAINLEVEL_OUTOF10: 5
PAINLEVEL_OUTOF10: 7
PAINLEVEL_OUTOF10: 7

## 2019-04-01 ASSESSMENT — PAIN DESCRIPTION - PROGRESSION
CLINICAL_PROGRESSION: GRADUALLY WORSENING

## 2019-04-01 ASSESSMENT — PAIN DESCRIPTION - PAIN TYPE
TYPE: ACUTE PAIN
TYPE: ACUTE PAIN

## 2019-04-01 ASSESSMENT — PAIN DESCRIPTION - ORIENTATION: ORIENTATION: RIGHT

## 2019-04-01 NOTE — ED NOTES
Side rails padded with blankets at this time       Yanira Mccarthy, [facility]  03/31/19 2137

## 2019-04-01 NOTE — PROGRESS NOTES
Hospitalist Progress Note      Name:  Pastor Gatica /Age/Sex: 1970  (46 y.o. female)   MRN & CSN:  [id number] & [id number] Admission Date/Time: 3/31/2019  6:31 PM   Location:  7394/2648-D PCP: No primary care provider on file. Hospital Day: 2    Assessment and Plan:   Pastor Gatica is a 46 y.o.  female  who presents with exacerbation of her chronic abdominal pain s/p gastric sleeve (2019). Established with Dr Lis Zamora and Dr Bruna Awad. Active Hospital Problems    Diagnosis Date Noted    Chronic pain syndrome [G89.4] 2019    Drug-seeking/Aberrant behavior [Z76.5] 2019    Pseudoseizure [F44.5] 2019    Morbid obesity with BMI of 50.0-59.9, adult ([facility] [address] [E66.01, Z68.43] 2018    Lupus (systemic lupus erythematosus) ([facility] [address] [M32.9] 2013       · oarrx checked - gabapentin 800mg tid and norco 7.5 q8 prn. NO other controlled substances active including ativan. · Give phenergan IM - restarted home pain medications only  · GSx can advance pain medications if he feels it necessary. · Return to home pain medication regimen  · OP f/u with GSx. · Adv diet  · Cancelled cdiff studies as she hasn't had a BM since admission; unlikely cdiff  · DC to home in AM. She is able to leave AMA as discussed as well. Diet Dietary Nutrition Supplements: Low Calorie High Protein Supplement   DVT Prophylaxis [] Lovenox, []  Heparin, [] SCDs, []No VTE prophylaxis, patient ambulating   GI Prophylaxis [] PPI, [] H2 Blocker, [] No GI prophylaxis, patient is receiving diet/Tube Feeds   Code Status Prior     History of Present Illness:     Pt S&E. No family at bedside. She begins grimacing and won't open her eyes due to the pain. Discussed returning her to her home medications and she then became wide awake and no grimacing noted. She is upset and discussed her chronicity of the illness along with the drug seeking/aberrant behavior noted since admission.      C/o abd pain that is unchanged since admission which is only b/w iv phenergan and ativan if they are pushed    10-14 point ROS reviewed negative, unless as noted above    Objective:   No intake or output data in the 24 hours ending 04/01/19 1058   Vitals:   Vitals:    04/01/19 1009   BP: 115/81   Pulse: 62   Resp: 18   Temp: 98.3 °F (36.8 °C)   SpO2: 94%     Physical Exam:    GEN Awake female, laying in bed in no apparent distress. Appears given age. EYES Pupils are equally round. No scleral discharge  HENT Atraumatic and symmetric head  NECK No apparent thyromegaly  RESP Symmetric chest movement while on room air. No wheezing  CARDIO/VASC Peripheral pulses equal bilaterally and palpable. No peripheral edema. GI Abdomen is not distended. Rectal exam deferred. Central obesity.  Ryder catheter is not present. HEME/LYMPH No petechiae or ecchymoses. MSK Spontaneous movement of BL upper extremities  SKIN Normal coloration, warm, dry. NEURO Cranial nerves appear grossly intact  PSYCH Awake, alert.  Oriented to self and location and time     Medications:   Medications:    atenolol  25 mg Oral Daily    calcium-vitamin D  1 tablet Oral BID    cloNIDine  0.1 mg Oral BID    dicyclomine  20 mg Oral 4 times per day    estradiol  0.5 mg Oral Daily    gabapentin  800 mg Oral TID    levothyroxine  125 mcg Oral Daily    CENTRUM/CERTA-LILLY with minerals oral  15 mL Oral Daily    PARoxetine  30 mg Oral Nightly    pantoprazole  40 mg Intravenous BID      Infusions:    sodium chloride 100 mL/hr at 04/01/19 0159     PRN Meds:   albuterol sulfate HFA 2 puff Q6H PRN   ondansetron 4 mg Q8H PRN   tiZANidine 4 mg BID PRN   promethazine 12.5 mg Q6H PRN   hyoscyamine 125 mcg Q4H PRN         Electronically signed by Jd Corona DO on 4/1/2019 at 10:58 AM

## 2019-04-01 NOTE — PROGRESS NOTES
Ms Shereen Dubose called the [facility] and spoke with Alma at 1155am today. \"I want it in my port or through the IV! \" Repeated the statement several times. Dr. Vitaly Brown.  [facility]  Internal Medicine Hospitalist  [facility]  4/1/2019 11:57 AM

## 2019-04-01 NOTE — PROGRESS NOTES
Pt is upset with Dr Derrick Delarosa decision to not order any new pain medications, particularly IV pain meds at this time, stating she does not wanting her to care for her any longer. This nurse informed her that doctors change at 1900 this evening. She also asked about oral pain medications which is ordered for her per her home medications. This nurse informed her of the dosage, and the frequency at this time. Pt states she takes her oral norco 7.5 Q4 hours at home. Home med list states it is Q8 hours. , medication list has been completed and found to be accurate upon admission.

## 2019-04-01 NOTE — PROGRESS NOTES
Nutrition Assessment    Type and Reason for Visit: Initial, Positive Nutrition Screen(wt loss)    Nutrition Recommendations:   · Please order Bariatric Soft Diet as timely as able  · Will modify oral supplement to low liam high protein four times daily to provide 160 kcal and 16 gm protein each. · Will order protein modular daily to meet protein needs. Nutrition Assessment: Pt at high nutrition risk with poor intake, N/V post op recent gastrectomy surgery for obesity. No intake recorded yet, pt sleeping during room visit. Malnutrition Assessment:  · Malnutrition Status:  At risk for malnutrition    Nutrition Risk Level: High    Nutrient Needs:  · Estimated Daily Total Kcal: 6128-7736 (-500 kcal for wt loss)  · Estimated Daily Protein (g):  (1.5-2 gm/kg IBW)  · Estimated Daily Total Fluid (ml/day): 4473-2947    Nutrition Diagnosis:   · Problem: Inadequate oral intake  · Etiology: related to Alteration in GI function, Nausea, Vomiting     Signs and symptoms:  as evidenced by Weight loss, Diet history of poor intake    Objective Information:  · Wound Type: Surgical Wound  · Current Nutrition Therapies:  · Oral Diet Orders:  None   · Oral Diet intake: Unable to assess  · Oral Nutrition Supplement (ONS) Orders: Low Calorie High Protein Supplement  · ONS intake: Unable to assess  · Anthropometric Measures:  · Ht: 5' 4\" (162.6 cm)   · Current Body Wt: 288 lb (130.6 kg)  · Admission Body Wt: 288 lb (130.6 kg)  · Usual Body Wt: 309 lb 2 oz (140.2 kg)(1/25/19)  · % Weight Change:  -7% in 2 months  · Ideal Body Wt: 120 lb (54.4 kg), % Ideal Body 240  · BMI Classification: BMI > or equal to 40.0 Obese Class III(49.5)    Nutrition Interventions:   Start oral diet, Modify current ONS, Start ONS  Continued Inpatient Monitoring, Education not appropriate at this time, Coordination of Care    Nutrition Evaluation:   · Evaluation: Goals set   · Goals:   Patient will meet at least 75% of estimated nutrient needs during

## 2019-04-01 NOTE — CARE COORDINATION
CM - room # 36 for pt with readmission possibility. Pt seen in  ER today for same -similar reasons in the past few visits - Abdominal pain -pt has had many tests done and has [employer] [facility] . Symptoms stem back to February this year with a gastric sleeve performed by Dr Frank Piage . ---22:40 update for pt condition ---- During pt care this visit pt had 2 episodes of seizure like activity in her room and when obtaining a CT. Pt will be admitted . Nothing here for the CM to Continue -as stated she has [facility] already.  Lucina Rubinstein / Melly [place] / [place]

## 2019-04-01 NOTE — ED NOTES
Pt had placed call light on due to monitor beeping; this nurse went in to silence and fix the monitor; as this nurse was walking in the pt was rolled over on her left side and her whole body started to shake; pt stayed on her side shaking no more than 2 minutes; while pt was shaking she was non verbal and was not following commands; Dr. Alberto Neri came into room after the shaking stopped and ordered 2mg of ativan IV; shortly after giving the ativan pt was starting to open her eyes slightly and still appears slightly post itcal; mom does not recall pt having seizures; Yanira Morrow, [facility]  03/31/19 2028

## 2019-04-01 NOTE — ED NOTES
hospitalist returned call @ [phone] -- transferred call to [facility]  Waiting orders     Charity Melendez  03/31/19 8234

## 2019-04-01 NOTE — ED NOTES
Pt states that sometimes when she takes pills at home it feels like they tend to slide back up; pt states that it doesn't happen all of the time and that when it does happen she has no control over it       Yanira Samson, [address] [place] [address]  03/31/19 0576

## 2019-04-01 NOTE — CONSULTS
SURGICAL CONSULTATION    Date of Admission:  3/31/2019  Date of Consultation:  2019    PCP:  No primary care provider on file. Thank you Dr. Argentina Puga MD very much for your consultation, it's always appreciated. I had the privilege today to see your patient Karoline Trammell. Chief Complaint / History of Present Illness:  Karoline Trammell is a very pleasant 46 y.o. female who presents with pain in the Epigastrium and radiates to the RUQ, and is acute, worsening and dull compared to patient's normal condition. It is moderate in intensity for a duration of 2 days and is intermittent with modifying factors increased by or worse with eating. .  I believe she is OVER eating, resulting in vomiting. Mark Swanson RE-Counseled her in length, and she will need to follow up with our dietitian soon, and I will follow. Her sleeve gastrectomy was on 19, and on 3/12/19 an EGD revealed NORMAL anatomy. Her Hb is stable at ~ 9.5 gm%. .    PMH:   has a past medical history of Acid reflux, Anemia, Anxiety, Arthritis, Bleeding ulcer (), Bronchitis (Last Episode ), Chronic back pain, Chronic pain, COPD (chronic obstructive pulmonary disease) ([facility] [address] Depression, Fibromyalgia (Dx ), H/O echocardiogram (8/11/15), H/O echocardiogram (2018), Hiatal hernia, History of blood transfusion (2015 And ), [place] (hard of hearing), cardiac catheterization (10/24/2010), transesophageal echocardiography (PILO) for monitoring (2010), Hypertension, Lupus (Dx ), Morbid obesity ([address] Nausea, Pain management, Pancreatitis chronic (Dx ), Pneumonia (Dx 11-15), Shortness of breath on exertion, Shortness of breath on exertion, Sleep apnea, Staph infection (Dx -15), Staph infection (Dx -15), Teeth missing, Thyroid disease, UTI (urinary tract infection) (In Past), and Wears glasses. PSH:   has a past surgical history that includes Lung removal, partial (Left, );  section (1991);  Foot surgery (Left, Last Done In 2016); Dental surgery; Cholecystectomy, laparoscopic (5147'F); other surgical history (02/1998); other surgical history (Last Done 7-15-16); Tonsillectomy and adenoidectomy (1975); Appendectomy (02/1998); Upper gastrointestinal endoscopy (08/27/2018); Lap Band (~2000); Hysterectomy (10/1997); Endoscopy, colon, diagnostic (Last Done In 2018); Colonoscopy (Last Done In 2000's); Cardiac catheterization (10/24/2010); Sleeve Gastrectomy (N/A, 2/12/2019); and hiatal hernia repair (N/A, 2/12/2019). Allergies: Allergies   Allergen Reactions    Aspirin Palpitations     \"My Heart Rate Elevates\"    Compazine [Prochlorperazine Maleate] Rash    Reglan [Metoclopramide Hcl] Rash    Shellfish-Derived Products Swelling    Toradol [Ketorolac Tromethamine] Rash        Home Meds:    Prior to Admission medications    Medication Sig Start Date End Date Taking? Authorizing Provider   LORazepam (ATIVAN) 1 MG tablet Take 1 mg by mouth nightly. Yes Historical Provider, MD   tiZANidine (ZANAFLEX) 4 MG tablet Take 4 mg by mouth 2 times daily as needed   Yes Historical Provider, MD   HYDROcodone-acetaminophen (NORCO) 7.5-325 MG per tablet Take 1 tablet by mouth every 8 hours as needed for Pain.    Yes Historical Provider, MD   promethazine (PHENERGAN) 25 MG tablet Take 25 mg by mouth every 6 hours as needed for Nausea   Yes Historical Provider, MD   ondansetron (ZOFRAN) 4 MG tablet Take 4 mg by mouth every 8 hours as needed for Nausea or Vomiting   Yes Historical Provider, MD   estradiol (ESTRACE) 0.5 MG tablet Take 0.5 mg by mouth daily   Yes Historical Provider, MD   atenolol (TENORMIN) 25 MG tablet Take 25 mg by mouth daily   Yes Historical Provider, MD   cloNIDine (CATAPRES) 0.1 MG tablet Take 0.1 mg by mouth 2 times daily   Yes Historical Provider, MD   Multiple Vitamin (MVI, BARIATRIC ADVANTAGE MULTI-FORMULA, CHEW TAB) Take 1 tablet by mouth daily 2/22/19  Yes Theron Leventhal, MD   Calcium Citrate-Vitamin D (CALCIUM + VIT D, BARIATRIC ADVANTAGE, CHEWABLE TABLET) Take 1 tablet by mouth 2 times daily 2/22/19  Yes Glenny Molina MD   pantoprazole (PROTONIX) 40 MG tablet Take 1 tablet by mouth 2 times daily 2/13/19  Yes Glenny Molina MD   Nutritional Supplements (ENSURE HIGH PROTEIN) LIQD Take 1 Can by mouth 4 times daily 2 week pre op diet. No other foods.  1/28/19  Yes MICHAEL Aguilar - CNP   albuterol sulfate HFA (VENTOLIN HFA) 108 (90 Base) MCG/ACT inhaler Inhale 2 puffs into the lungs every 6 hours as needed for Wheezing 10/18/18  Yes Ulysses Hacking, MD   levothyroxine (SYNTHROID) 125 MCG tablet Take 1 tablet by mouth Daily  Patient taking differently: Take 125 mcg by mouth nightly  8/8/18  Yes Kalli Zhao MD   gabapentin (NEURONTIN) 800 MG tablet Take 800 mg by mouth 3 times daily   Yes Historical Provider, MD   dicyclomine (BENTYL) 20 MG tablet Take 20 mg by mouth every 6 hours    Historical Provider, MD   PARoxetine (PAXIL) 30 MG tablet Take 30 mg by mouth nightly     Historical Provider, MD        [facility] Meds:    Current Facility-Administered Medications   Medication Dose Route Frequency Provider Last Rate Last Dose    albuterol sulfate  (90 Base) MCG/ACT inhaler 2 puff  2 puff Inhalation Q6H PRN Michelle Rocha MD        atenolol (TENORMIN) tablet 25 mg  25 mg Oral Daily Michelle Rocha MD        calcium-vitamin D (OSCAL-500) 500-200 MG-UNIT per tablet 1 tablet  1 tablet Oral BID Michelle Rocha MD        cloNIDine (CATAPRES) tablet 0.1 mg  0.1 mg Oral BID Michelle Rocha MD        dicyclomine (BENTYL) tablet 20 mg  20 mg Oral 4 times per day Michelle Rocha MD        estradiol (ESTRACE) tablet 0.5 mg  0.5 mg Oral Daily Michelle Rocha MD        gabapentin (NEURONTIN) capsule 800 mg  800 mg Oral TID Michelle Rocha MD        levothyroxine (SYNTHROID) tablet 125 mcg  125 mcg Oral Daily Michelle Rocha MD        LORazepam (ATIVAN) tablet 1 mg  1 mg Oral Nightly MD Leyla Campbell  Intimate partner violence:     Fear of current or ex partner: None     Emotionally abused: None     Physically abused: None     Forced sexual activity: None   Other Topics Concern    None   Social History Narrative    None      Family History   Problem Relation Age of Onset    Diabetes Mother         \"Borderline Diabetes\"    High Blood Pressure Mother     Obesity Mother     Arthritis Mother     Heart Disease Mother     High Cholesterol Mother     Vision Loss Mother     Diabetes Father     High Blood Pressure Father     Asthma Father     High Blood Pressure Brother     Asthma Son     Vision Loss Son     Lupus Daughter     Other Daughter         \"Alot Of Female Problems\"    otherwise irrelevant to this surgical issue. Review of Systems:  Constitutional: Negative for fever, chills, diaphoresis, appetite change and fatigue. HENT: Negative for sore throat, trouble swallowing and voice change. Respiratory: Negative for cough, positive for shortness of breath no wheezing. Cardiovascular: Negative for chest pain positive for SOB with one flight of stairs exertion, no pitting LE edema. Gastrointestinal: Positive for nausea, vomiting, and epigastric pain, NO abdominal distention, constipation, positive diarrhea, NO blood in stool, anal bleeding or rectal pain. Musculoskeletal: Negative for joint swelling and arthralgias. Skin: Warm and dry, well perfused. Neurological: Negative for seizures, syncope, speech difficulty and weakness. Hematological/Lymphatic: Negative for adenopathy. No history of DVT/PE. Does not bruise/bleed easily. Psychiatric/Behavioral: Negative for agitation. All others reviewed and negative. Physical Exam:  Vital Signs:   Vitals:    04/01/19 0300   BP: (!) 102/55   Pulse: 64   Resp: 17   Temp: 97.5 °F (36.4 °C)   SpO2:      Body mass index is 49.44 kg/m². General appearance: Pt Alert and oriented, in no apparent acute distress.   HEENT:  KATE, Conjunctiva clear.  EOMI, No JVDs, Bruits, Megalies: neither thyroid nor lymph nodes. Lungs: Clear to auscultation bilaterally. Heart: Regular rate and rhythm, S1, S2 normal, no murmur, rub or gallop. Abdomen: Mildly tender epigastrium. Non distended. Positive bowel sounds. No hernias noted, no masses palpable. Extremities: Warm, well perfused, no cyanosis or edema. Skin: Skin color, texture, turgor normal.  Neurologic: Grossly normal, Cranial nerves from II to XII intact, Deep tendon reflexes normal.  Lymph nodes: No palpable LNs, Cervical, groins, abdominal.  Musculoskeletal: Bilateral Upper and lower extremities ROM within normal limits. Radiologic / Imaging / TESTING  No results found.      Labs:    Recent Results (from the past 24 hour(s))   Urinalysis    Collection Time: 03/31/19  7:35 PM   Result Value Ref Range    Color, UA YELLOW YELLOW    Clarity, UA CLEAR CLEAR    Glucose, Urine NEGATIVE NEGATIVE MG/DL    Bilirubin Urine NEGATIVE NEGATIVE MG/DL    Ketones, Urine NEGATIVE NEGATIVE MG/DL    Specific Gravity, UA 1.017 1.001 - 1.035    Blood, Urine NEGATIVE NEGATIVE    pH, Urine 5.0 5.0 - 8.0    Protein, UA NEGATIVE NEGATIVE MG/DL    Urobilinogen, Urine NORMAL 0.2 - 1.0 MG/DL    Nitrite Urine, Quantitative NEGATIVE NEGATIVE    Leukocyte Esterase, Urine NEGATIVE NEGATIVE    RBC, UA NONE SEEN 0 - 6 /HPF    WBC, UA 2 0 - 5 /HPF    Bacteria, UA NEGATIVE NEGATIVE /HPF    Squam Epithel, UA 11 /HPF    Mucus, UA RARE (A) NEGATIVE HPF    Trichomonas, UA NONE SEEN NONE SEEN /HPF   CBC auto diff    Collection Time: 03/31/19  7:36 PM   Result Value Ref Range    WBC 4.1 4.0 - 10.5 K/CU MM    RBC 4.08 (L) 4.2 - 5.4 M/CU MM    Hemoglobin 10.5 (L) 12.5 - 16.0 GM/DL    Hematocrit 34.4 (L) 37 - 47 %    MCV 84.3 78 - 100 FL    MCH 25.7 (L) 27 - 31 PG    MCHC 30.5 (L) 32.0 - 36.0 %    RDW 14.0 11.7 - 14.9 %    Platelets 606 265 - 966 K/CU MM    MPV 10.8 7.5 - 11.1 FL    Differential Type AUTOMATED DIFFERENTIAL     Segs Relative 43.3 36 - 66 %    Lymphocytes % 39.3 24 - 44 %    Monocytes % 7.5 (H) 0 - 4 %    Eosinophils % 9.2 (H) 0 - 3 %    Basophils % 0.7 0 - 1 %    Segs Absolute 1.8 K/CU MM    Lymphocytes # 1.6 K/CU MM    Monocytes # 0.3 K/CU MM    Eosinophils # 0.4 K/CU MM    Basophils # 0.0 K/CU MM    Nucleated RBC % 0.0 %    Total Nucleated RBC 0.0 K/CU MM    Total Immature Neutrophil 0.00 K/CU MM    Immature Neutrophil % 0.0 0 - 0.43 %   CMP    Collection Time: 03/31/19  7:36 PM   Result Value Ref Range    Sodium 141 135 - 145 MMOL/L    Potassium 4.7 3.5 - 5.1 MMOL/L    Chloride 106 99 - 110 mMol/L    CO2 26 21 - 32 MMOL/L    BUN 15 6 - 23 MG/DL    CREATININE 0.7 0.6 - 1.1 MG/DL    Glucose 111 (H) 70 - 99 MG/DL    Calcium 9.7 8.3 - 10.6 MG/DL    Alb 4.1 3.4 - 5.0 GM/DL    Total Protein 6.9 6.4 - 8.2 GM/DL    Total Bilirubin 0.2 0.0 - 1.0 MG/DL    ALT 47 (H) 10 - 40 U/L    AST 39 (H) 15 - 37 IU/L    Alkaline Phosphatase 115 40 - 129 IU/L    GFR Non-African American >60 >60 mL/min/1.73m2    GFR African American >60 >60 mL/min/1.73m2    Anion Gap 9 4 - 16   Lipase    Collection Time: 03/31/19  7:36 PM   Result Value Ref Range    Lipase 19 13 - 60 IU/L   TYPE AND SCREEN    Collection Time: 03/31/19  7:36 PM   Result Value Ref Range    ABO/Rh A POSITIVE     Antibody Screen POSITIVE     Antibody Data  ANTI-E PRESENT     Unit Number [id number]     Component LEUKO-POOR RED CELLS     Unit Divison 00     Status ALLOCATED     Transfusion Status OK TO TRANSFUSE     Crossmatch Result COMPATIBLE     Unit Number [id number]     Component LEUKO-POOR RED CELLS     Unit Divison 00     Status ALLOCATED     Transfusion Status OK TO TRANSFUSE     Crossmatch Result COMPATIBLE    Lactic Acid, Plasma    Collection Time: 03/31/19  7:36 PM   Result Value Ref Range    Lactate 1.5 0.4 - 2.0 mMOL/L   Magnesium    Collection Time: 03/31/19  7:36 PM   Result Value Ref Range    Magnesium 2.0 1.8 - 2.4 mg/dl   Lactic Acid, Plasma    Collection Time: 03/31/19 9:05 PM   Result Value Ref Range    Lactate (HH) 0.4 - 2.0 mMOL/L     2.6  LACT CALLED TO DR Melinda Velázquez AT 2141, Luis Eduardomarvin Eisenmenger MLS  RESULTS READ BACK     Prolactin    Collection Time: 03/31/19  9:05 PM   Result Value Ref Range    Prolactin 24.6 ng/ml   CBC    Collection Time: 04/01/19  8:32 AM   Result Value Ref Range    WBC 3.1 (L) 4.0 - 10.5 K/CU MM    RBC 3.75 (L) 4.2 - 5.4 M/CU MM    Hemoglobin 9.5 (L) 12.5 - 16.0 GM/DL    Hematocrit 34.4 (L) 37 - 47 %    MCV 91.7 78 - 100 FL    MCH 25.3 (L) 27 - 31 PG    MCHC 27.6 (L) 32.0 - 36.0 %    RDW 14.0 11.7 - 14.9 %    Platelets 282 368 - 409 K/CU MM    MPV 10.7 7.5 - 11.1 FL       Diagnosis:  Patient Active Problem List   Diagnosis    Abdominal pain    Rectal bleeding    Fever    Hematemesis    Fibromyalgia    Lupus (systemic lupus erythematosus) (HCC)    Nausea & vomiting    Chest pain    Chronic pancreatitis (HCC)    HTN (hypertension)    Acute pancreatitis    Right-sided chest pain    Super obesity    Normocytic anemia    Hypokalemia    Generalized abdominal pain    Pneumonia of both lungs due to infectious organism    Foot ulcer, left (HCC)    SLE (systemic lupus erythematosus related syndrome) (HCC)    Hypoxia    Cellulitis    Pancytopenia (HCC)    Pleurisy    Frequent UTI    Gastroesophageal reflux disease without esophagitis    MRSA cellulitis of left foot    Depression    Fatty liver    Fatty liver disease, nonalcoholic    Arthritis    Bilateral low back pain with left-sided sciatica    Morbid obesity with BMI of 50.0-59.9, adult (HCC)    Intractable vomiting with nausea    Class 3 obesity in adult    Hiatal hernia    Poor intravenous access    Post-operative nausea and vomiting    Status post bariatric surgery    Status post laparoscopic sleeve gastrectomy    Abscess    Dysphagia    Tonic-clonic seizures (HCC)       Assessment & plan:    Abdominal pain, and N/V, 2 months after her bariatric surgery;    I believe she is OVER eating, resulting in vomiting. Michelle Mcgee RE-Counseled her in length, and she will need to follow up with our dietitian soon, and I will follow. Her sleeve gastrectomy was on 2/12/19, and on 3/12/19 an EGD revealed NORMAL anatomy. Her Hb is stable at ~ 9.5 gm%. .    Thank you doctor Manisha Dan MD very much for your consultation and for the opportunity to take care of Mrs Paulette Bailey with you, I'll follow along with you and I'll update you on any new events in her care.    ____________________________________________    Mia March MD, FACS, FICS    4/1/2019  9:07 AM      Patient was seen with total face to face time of 45 minutes. More than 50% of this visit was counseling and education as above in my assessment and plan section of my note.

## 2019-04-01 NOTE — PROGRESS NOTES
Late entry: This nurse received several calls regarding this pt wanting phenergan, and pain medication right at shift change. This nurse told the pt she would see when they were due and be back with them. This nurse also explained that phenergan is an IM medication. Pt refused to take it any other way other than IV. This nurse explained that this was very hard on the veins even through her port. Pt still refused to take it any other way stating that she always has it through there and is not concerned. This nurse let the pt know phenergan would not be able to be given till after 0900 and she wasn't sure of the pain medication. This nurse pulled both the phenergan and morphine along with morning medications. When scanning the morphine for this pt, epic alerted her that it was too close to previous administration. This nurse let the pt know this and she began to state she hadn't had any. This nurse informed her it was given at 7381, as shown in her mar and the pt began to ask if she could have it any way. This nurse informed her that she could not give it to her at this time due to order parameters. The pt began to then ask if this nurse would ask the doctor to adjust the parameters and get a one time dose at this time. This nurse told the pt she would talk to the doctor and see. This nurse went to administer the morphine a second time and Dr Aliyah Thornton was rounding on the floor, this nurse talked to her and at this time she wanted to discontinue the morphine. This nurse attempted to administer the morphine the second time and could not due to the discontinuing of the order. This nurse explained to the pt that she could not give her the morphine due to the order no longer being active. The pt began to ask for it anyway as a last dose. This nurse further explained that she could not and why she could not at this time. The pt asked if the doctor could please give her something for pain as she was in pain.  This nurse informed the pt that the doctor was rounding at this time and she would speak with her. This pt's home medication of norco was ordered by Dr Benjy Palma who rounded on the pt within minutes later.

## 2019-04-01 NOTE — ED NOTES
Repeat lactic on ice and prolactin level drawn off of mediport and sent down to lab per orders       Yanira Chatman, [facility]  03/31/19 8441

## 2019-04-01 NOTE — PROGRESS NOTES
This nurse received a call about this pt being nauseous and requesting an antiemetic. Awa XIONG states she seems to be coughing more than vomitting at this time. This nurse pulled phenergan and at this time pt was found to be sleeping upon arrival to the room. While pulling up the phenergan the pt coughed once but but did not vomit. This pt had no vomit in her bag or bucket that she was given at this time. Pt is stating she has a pain level of 10/10 but is nodding off but easily aroused to speech, blood pressure is 102/57 map of 71, heart rate is 61, and glucose is 102.

## 2019-04-01 NOTE — ED PROVIDER NOTES
I independently examined and evaluated Andre Pope. In brief, patient w/ significant history of multiple abdominal surgeries presenting with abdominal pain, nausea,  Vomiting for approximately 3 days. Vitals:    03/31/19 1945   BP: (!) 113/55   Pulse: 57   Resp: 18   Temp: 98.7 °F (37.1 °C)   SpO2: 93%     Focused exam revealed     General appearance:  No acute distress. Obese female  Skin:  Warm. Dry. Respiratory:  Lungs clear to auscultation bilaterally. Respirations nonlabored. Abdominal:  Normal bowel sounds. Soft. generalized abdominal tenderness. Non distended. ED course:  Presenting with abdominal discomfort. Patient's labs noted to be within normal limits no concerns for leukocytosis. CAT scan ordered and was unremarkable. While awaiting results and being reevaluated Patient at bedside noted to have seizure-like activity, but he was tonic eye opening. Not responsive. Given 2 mg Ativan IV. CT head ordered. Lactate 2.6 compared to 1.5, IV fluids ordered. Patient to be admitted for observation. All diagnostic, treatment, and disposition decisions were made by myself in conjunction with the advanced practice provider. For all further details of the patient's emergency department visit, please see the advanced practice provider's documentation. Comment: Please note this report has been produced using speech recognition software and may contain errors related to that system including errors in grammar, punctuation, and spelling, as well as words and phrases that may be inappropriate. If there are any questions or concerns please feel free to contact the dictating provider for clarification.         Adilia Norris MD  04/01/19 4408

## 2019-04-01 NOTE — ED NOTES
Report received from [facility] BERNARDA, care assumed at this time      Cheryl Blancas, RN  03/31/19 0884

## 2019-04-01 NOTE — H&P
HISTORY AND PHYSICAL  (Hospitalist, Internal Medicine)  IDENTIFYING INFORMATION   PATIENT:  Justin Odonnell  MRN:  [id number]  ADMIT DATE: 3/31/2019  TIME OF EVALUATION: 4/1/2019 12:52 AM    CHIEF COMPLAINT     Abdominal pain    HISTORY OF PRESENT ILLNESS   Andre Perez is a 46 y.o. female presents with abdominal pain. 57-year-old female presents to the ED with a complaint of abdominal pain which had been ongoing for about 2 days associated with nausea and vomiting and diarrhea. Vomiting initially was blood streaked however became more bloody with blood clots. This has since subsided. She also reports diarrhea over the past couple of days. This however is nonbloody and yellow in color and watery. She had a bariatric surgery done in February and had otherwise been doing well. She had a CT scan of the abdomen done on presentation which did not show any acute abnormality in the abdomen or pelvis. Patient to be admitted due to the concern for possible postop complications and also to evaluate for upper GI bleed with concern for gastroenterocolitis. There was a concern for seizure-like activity in the ER however on further inquiry patient reports having episodes of severe abdominal pain with spasms which is overwhelming for which she reacts with what seems to be a seizure-like activity. PAST MEDICAL, SURGICAL, FAMILY, and SOCIAL HISTORY     Past Medical History:   Diagnosis Date    Acid reflux     Anemia     Anxiety     Arthritis     Hands, Back And Ankles    Bleeding ulcer 2014    \"I Had Ulcers In My Stomach And Colon\"    Bronchitis Last Episode 2014    Chronic back pain     Chronic pain     [facility]    COPD (chronic obstructive pulmonary disease) ([email or web address]     Sees Dr. Brandi Taylor Depression     Fibromyalgia Dx 2013    H/O echocardiogram 8/11/15    EF >55%. LA to be at the upper limit of normal in size.  LV hypertrophy with normal LV systolic, but abnormal diastolic function. Normal valvular structures and function.  H/O echocardiogram 2018    EF 55-60%    Hiatal hernia     History of blood transfusion 2015 And     No Reaction To Blood Transfusions Received    [place] (hard of hearing)     Right Ear    Hx of cardiac catheterization 10/24/2010    Angiographically normal coronary arteries w/ normal LV function and wall motion.  Hx of transesophageal echocardiography (PILO) for monitoring 2010    EF 55-60%. WNL.     Hypertension     Lupus Dx     \"Rheumatoid Lupus\"    Morbid obesity ([address]     Nausea     \"Most Of The Time\"    Pain management     Sees Dr. Kori Eaton, Once A Month    Pancreatitis chronic Dx     Pneumonia Dx -15    Shortness of breath on exertion     Shortness of breath on exertion     Sleep apnea     Has CPAP    Staph infection Dx 11-15    Left Foot    Staph infection Dx 11-15    \"Left Foot\"    Teeth missing     Upper And Lower    Thyroid disease     UTI (urinary tract infection) In Past    No Current Symptoms    Wears glasses     To Read     Past Surgical History:   Procedure Laterality Date    APPENDECTOMY  1998    Done When Tubes And Ovaries Were Removed    CARDIAC CATHETERIZATION  10/24/2010     SECTION  1991    CHOLECYSTECTOMY, LAPAROSCOPIC      COLONOSCOPY  Last Done In     DENTAL SURGERY      Teeth Extracted In Past    ENDOSCOPY, COLON, DIAGNOSTIC  Last Done In 2018    FOOT SURGERY Left Last Done In 2016     Dr. Jagdish Yanes, \" About 6 Surgeries Done Because Of Staph Infection\"    HIATAL HERNIA REPAIR N/A 2019    HERNIA HIATAL LAPAROSCOPIC ROBOTIC [facility]  10/1997    Partial Abdominal Hysterectomy    LAP BAND  ~2000    removed after 6 months    LUNG REMOVAL, PARTIAL Left     Benign    OTHER SURGICAL HISTORY  1998    \"Tubes And Ovaries Removed, Appendectomy Also Done\"    OTHER SURGICAL HISTORY  Last Done 7-15-16    Mediport Insertion \"Total Of Six Done, Removed Last Mediport In 2014\"    SLEEVE GASTRECTOMY N/A 2/12/2019    GASTRECTOMY SLEEVE LAPAROSCOPIC ROBOTIC performed by Theron Leventhal, MD at [address] [place] [address] ENDOSCOPY  08/27/2018     Family History   Problem Relation Age of Onset    Diabetes Mother         \"Borderline Diabetes\"    High Blood Pressure Mother     Obesity Mother     Arthritis Mother     Heart Disease Mother     High Cholesterol Mother     Vision Loss Mother     Diabetes Father     High Blood Pressure Father     Asthma Father     High Blood Pressure Brother     Asthma Son     Vision Loss Son     Lupus Daughter     Other Daughter         \"Alot Of Female Problems\"     Family Hx of HTN  Family Hx as reviewed above, otherwise non-contributory  Social History     Socioeconomic History    Marital status:      Spouse name: None    Number of children: None    Years of education: None    Highest education level: None   Occupational History    None   Social Needs    Financial resource strain: None    Food insecurity:     Worry: None     Inability: None    Transportation needs:     Medical: None     Non-medical: None   Tobacco Use    Smoking status: Never Smoker    Smokeless tobacco: Never Used   Substance and Sexual Activity    Alcohol use: No     Alcohol/week: 0.0 oz    Drug use: No    Sexual activity: Never   Lifestyle    Physical activity:     Days per week: None     Minutes per session: None    Stress: None   Relationships    Social connections:     Talks on phone: None     Gets together: None     Attends Sikh service: None     Active member of club or organization: None     Attends meetings of clubs or organizations: None     Relationship status: None    Intimate partner violence:     Fear of current or ex partner: None     Emotionally abused: None     Physically abused: None     Forced sexual activity: None   Other Topics Concern    None   Social History Narrative    None       MEDICATIONS   Medications Prior to Admission  Medications Prior to Admission: LORazepam (ATIVAN) 1 MG tablet, Take 1 mg by mouth nightly. tiZANidine (ZANAFLEX) 4 MG tablet, Take 4 mg by mouth 2 times daily as needed  HYDROcodone-acetaminophen (NORCO) 7.5-325 MG per tablet, Take 1 tablet by mouth every 8 hours as needed for Pain. promethazine (PHENERGAN) 25 MG tablet, Take 25 mg by mouth every 6 hours as needed for Nausea  ondansetron (ZOFRAN) 4 MG tablet, Take 4 mg by mouth every 8 hours as needed for Nausea or Vomiting  estradiol (ESTRACE) 0.5 MG tablet, Take 0.5 mg by mouth daily  atenolol (TENORMIN) 25 MG tablet, Take 25 mg by mouth daily  cloNIDine (CATAPRES) 0.1 MG tablet, Take 0.1 mg by mouth 2 times daily  Multiple Vitamin (MVI, BARIATRIC ADVANTAGE MULTI-FORMULA, CHEW TAB), Take 1 tablet by mouth daily  Calcium Citrate-Vitamin D (CALCIUM + VIT D, BARIATRIC ADVANTAGE, CHEWABLE TABLET), Take 1 tablet by mouth 2 times daily  pantoprazole (PROTONIX) 40 MG tablet, Take 1 tablet by mouth 2 times daily  Nutritional Supplements (ENSURE HIGH PROTEIN) LIQD, Take 1 Can by mouth 4 times daily 2 week pre op diet. No other foods.   albuterol sulfate HFA (VENTOLIN HFA) 108 (90 Base) MCG/ACT inhaler, Inhale 2 puffs into the lungs every 6 hours as needed for Wheezing  levothyroxine (SYNTHROID) 125 MCG tablet, Take 1 tablet by mouth Daily (Patient taking differently: Take 125 mcg by mouth nightly )  gabapentin (NEURONTIN) 800 MG tablet, Take 800 mg by mouth 3 times daily  dicyclomine (BENTYL) 20 MG tablet, Take 20 mg by mouth every 6 hours  PARoxetine (PAXIL) 30 MG tablet, Take 30 mg by mouth nightly     Current Medications  Current Facility-Administered Medications   Medication Dose Route Frequency Provider Last Rate Last Dose    albuterol sulfate  (90 Base) MCG/ACT inhaler 2 puff  2 puff Inhalation Q6H PRN Louis Smyth MD OF SYSTEMS   Within above limitations. 14 point review of systems reviewed. Pertinent positive or negative as per HPI or otherwise negative per 14 point systems review. PHYSICAL EXAM     Wt Readings from Last 3 Encounters:   04/01/19 288 lb (130.6 kg)   03/20/19 290 lb (131.5 kg)   03/16/19 294 lb (133.4 kg)       Blood pressure (!) 108/53, pulse 67, temperature 99 °F (37.2 °C), temperature source Oral, resp. rate 23, height 5' 4\" (1.626 m), weight 288 lb (130.6 kg), SpO2 93 %, not currently breastfeeding. General - AAO x 3  Psych - Appropriate affect/speech. No agitation  Eyes - Eye lids intact. No scleral icterus  ENT - Lips wnl. External ear clear/dry/intact. No thyromegaly on inspection  Neuro - No gross peripheral or central neuro deficits on inspection  Heart - Sinus. RRR. S1 and S2 present. No added HS/murmurs appreciated. No elevated JVD appreciated  Lung - Adequate air entry b/l, No crackles/wheezes appreciated  GI - Soft. No guarding/rigidity. No hepatosplenomegaly/ascites. BS+ mildly tender   - No CVA/suprapubic tenderness or palpable bladder distension  Skin - Intact. No rash/petechiae/ecchymosis. Warm extremities  MSK - Joints with normal ROM.  No joint swellings    Lines/Drains/Airways/Wounds:  [unfilled]    LABS AND IMAGING   CBC  [unfilled]    Last 3 Hemoglobin  Lab Results   Component Value Date    HGB 10.5 03/31/2019    HGB 10.8 03/20/2019    HGB 10.4 03/16/2019     Last 3 WBC/ANC  Lab Results   Component Value Date    WBC 4.1 03/31/2019    WBC 4.6 03/20/2019    WBC 4.0 03/16/2019     No components found for: GRNLOCTYABS  Last 3 Platelets  No results found for: PLATELET  Chemistry  [unfilled]  [unfilled]  Lab Results   Component Value Date     08/05/2018     03/05/2016     01/02/2016     Coagulation Studies  Lab Results   Component Value Date    INR 0.99 03/20/2019     Liver Function Studies  Lab Results   Component Value Date    ALT 47 03/31/2019    AST 39 03/31/2019    ALKPHOS 115 03/31/2019       Recent Imaging        Relevant labs and imaging reviewed    ASSESSMENT AND PLAN     Gastroenteritis  Continue IV fluid resuscitation,  IV Protonix  IV Zofran and Phenergan as needed  Patient may benefit from an endoscopy if hematemesis persists. H&H monitoring  Gen. surgery consult   Morphine as needed  De-escalates pain medication if not more evidence of hematemesis    Peripheral neuropathy  Continue on Neurontin    Hypothyroidism  Continue levothyroxine    Anxiety and depression  Continue on anxiolytics as well as antidepressants. DVT prophylaxis  SCDs    Case d/w ED physician  Time spent on admission: 40 minutes.   Hospitalist, Internal Medicine  4/1/2019 at 12:52 AM

## 2019-04-02 ENCOUNTER — ANESTHESIA (OUTPATIENT)
Dept: ENDOSCOPY | Age: 49
DRG: 392 | End: 2019-04-02
Payer: COMMERCIAL

## 2019-04-02 ENCOUNTER — ANESTHESIA EVENT (OUTPATIENT)
Dept: ENDOSCOPY | Age: 49
DRG: 392 | End: 2019-04-02
Payer: COMMERCIAL

## 2019-04-02 VITALS — OXYGEN SATURATION: 100 % | DIASTOLIC BLOOD PRESSURE: 78 MMHG | SYSTOLIC BLOOD PRESSURE: 129 MMHG

## 2019-04-02 LAB
ANION GAP SERPL CALCULATED.3IONS-SCNC: 7 MMOL/L (ref 4–16)
BUN BLDV-MCNC: 6 MG/DL (ref 6–23)
CALCIUM SERPL-MCNC: 9.7 MG/DL (ref 8.3–10.6)
CHLORIDE BLD-SCNC: 108 MMOL/L (ref 99–110)
CO2: 27 MMOL/L (ref 21–32)
CREAT SERPL-MCNC: 0.6 MG/DL (ref 0.6–1.1)
GFR AFRICAN AMERICAN: >60 ML/MIN/1.73M2
GFR NON-AFRICAN AMERICAN: >60 ML/MIN/1.73M2
GLUCOSE BLD-MCNC: 95 MG/DL (ref 70–99)
HCT VFR BLD CALC: 31 % (ref 37–47)
HEMOGLOBIN: 9.4 GM/DL (ref 12.5–16)
LIPASE: 13 IU/L (ref 13–60)
MCH RBC QN AUTO: 25.9 PG (ref 27–31)
MCHC RBC AUTO-ENTMCNC: 30.3 % (ref 32–36)
MCV RBC AUTO: 85.4 FL (ref 78–100)
PDW BLD-RTO: 14 % (ref 11.7–14.9)
PLATELET # BLD: 149 K/CU MM (ref 140–440)
PMV BLD AUTO: 10.6 FL (ref 7.5–11.1)
POTASSIUM SERPL-SCNC: 4 MMOL/L (ref 3.5–5.1)
RBC # BLD: 3.63 M/CU MM (ref 4.2–5.4)
SODIUM BLD-SCNC: 142 MMOL/L (ref 135–145)
WBC # BLD: 3.1 K/CU MM (ref 4–10.5)

## 2019-04-02 PROCEDURE — 6360000002 HC RX W HCPCS: Performed by: NURSE ANESTHETIST, CERTIFIED REGISTERED

## 2019-04-02 PROCEDURE — 2580000003 HC RX 258: Performed by: NURSE ANESTHETIST, CERTIFIED REGISTERED

## 2019-04-02 PROCEDURE — 2500000003 HC RX 250 WO HCPCS: Performed by: NURSE ANESTHETIST, CERTIFIED REGISTERED

## 2019-04-02 PROCEDURE — 99232 SBSQ HOSP IP/OBS MODERATE 35: CPT | Performed by: SURGERY

## 2019-04-02 PROCEDURE — 43243 EGD INJECTION VARICES: CPT | Performed by: SURGERY

## 2019-04-02 PROCEDURE — 2580000003 HC RX 258: Performed by: SURGERY

## 2019-04-02 PROCEDURE — 85027 COMPLETE CBC AUTOMATED: CPT

## 2019-04-02 PROCEDURE — 2709999900 HC NON-CHARGEABLE SUPPLY: Performed by: SURGERY

## 2019-04-02 PROCEDURE — 2580000003 HC RX 258: Performed by: HOSPITALIST

## 2019-04-02 PROCEDURE — 80048 BASIC METABOLIC PNL TOTAL CA: CPT

## 2019-04-02 PROCEDURE — 3700000000 HC ANESTHESIA ATTENDED CARE: Performed by: SURGERY

## 2019-04-02 PROCEDURE — C9113 INJ PANTOPRAZOLE SODIUM, VIA: HCPCS | Performed by: HOSPITALIST

## 2019-04-02 PROCEDURE — 3700000001 HC ADD 15 MINUTES (ANESTHESIA): Performed by: SURGERY

## 2019-04-02 PROCEDURE — 6360000002 HC RX W HCPCS: Performed by: SURGERY

## 2019-04-02 PROCEDURE — 2060000000 HC ICU INTERMEDIATE R&B

## 2019-04-02 PROCEDURE — 6360000002 HC RX W HCPCS: Performed by: HOSPITALIST

## 2019-04-02 PROCEDURE — 6370000000 HC RX 637 (ALT 250 FOR IP): Performed by: SURGERY

## 2019-04-02 PROCEDURE — 0W3P8ZZ CONTROL BLEEDING IN GASTROINTESTINAL TRACT, VIA NATURAL OR ARTIFICIAL OPENING ENDOSCOPIC: ICD-10-PCS | Performed by: SURGERY

## 2019-04-02 PROCEDURE — 3609013000 HC EGD TRANSORAL CONTROL BLEEDING ANY METHOD: Performed by: SURGERY

## 2019-04-02 PROCEDURE — 94761 N-INVAS EAR/PLS OXIMETRY MLT: CPT

## 2019-04-02 PROCEDURE — C9113 INJ PANTOPRAZOLE SODIUM, VIA: HCPCS | Performed by: SURGERY

## 2019-04-02 PROCEDURE — 6370000000 HC RX 637 (ALT 250 FOR IP): Performed by: HOSPITALIST

## 2019-04-02 PROCEDURE — 83690 ASSAY OF LIPASE: CPT

## 2019-04-02 RX ORDER — SODIUM CHLORIDE 9 MG/ML
INJECTION, SOLUTION INTRAVENOUS CONTINUOUS PRN
Status: DISCONTINUED | OUTPATIENT
Start: 2019-04-02 | End: 2019-04-02 | Stop reason: SDUPTHER

## 2019-04-02 RX ORDER — MORPHINE SULFATE 4 MG/ML
2 INJECTION, SOLUTION INTRAMUSCULAR; INTRAVENOUS EVERY 4 HOURS PRN
Status: DISCONTINUED | OUTPATIENT
Start: 2019-04-02 | End: 2019-04-02

## 2019-04-02 RX ORDER — METOPROLOL TARTRATE 5 MG/5ML
INJECTION INTRAVENOUS PRN
Status: DISCONTINUED | OUTPATIENT
Start: 2019-04-02 | End: 2019-04-02 | Stop reason: SDUPTHER

## 2019-04-02 RX ORDER — MORPHINE SULFATE 4 MG/ML
2 INJECTION, SOLUTION INTRAMUSCULAR; INTRAVENOUS
Status: DISPENSED | OUTPATIENT
Start: 2019-04-02 | End: 2019-04-03

## 2019-04-02 RX ORDER — PROPOFOL 10 MG/ML
INJECTION, EMULSION INTRAVENOUS PRN
Status: DISCONTINUED | OUTPATIENT
Start: 2019-04-02 | End: 2019-04-02 | Stop reason: SDUPTHER

## 2019-04-02 RX ORDER — MORPHINE SULFATE 4 MG/ML
2 INJECTION, SOLUTION INTRAMUSCULAR; INTRAVENOUS
Status: DISCONTINUED | OUTPATIENT
Start: 2019-04-02 | End: 2019-04-02

## 2019-04-02 RX ADMIN — MORPHINE SULFATE 2 MG: 4 INJECTION INTRAVENOUS at 16:33

## 2019-04-02 RX ADMIN — DICYCLOMINE HYDROCHLORIDE 20 MG: 20 TABLET ORAL at 18:20

## 2019-04-02 RX ADMIN — GABAPENTIN 800 MG: 400 CAPSULE ORAL at 21:23

## 2019-04-02 RX ADMIN — PROMETHAZINE HYDROCHLORIDE 12.5 MG: 25 INJECTION INTRAMUSCULAR; INTRAVENOUS at 10:08

## 2019-04-02 RX ADMIN — PROPOFOL 150 MG: 10 INJECTION, EMULSION INTRAVENOUS at 11:40

## 2019-04-02 RX ADMIN — PANTOPRAZOLE SODIUM 40 MG: 40 INJECTION, POWDER, FOR SOLUTION INTRAVENOUS at 12:14

## 2019-04-02 RX ADMIN — MORPHINE SULFATE 2 MG: 4 INJECTION INTRAVENOUS at 11:05

## 2019-04-02 RX ADMIN — ATENOLOL 25 MG: 25 TABLET ORAL at 14:33

## 2019-04-02 RX ADMIN — SODIUM CHLORIDE: 9 INJECTION, SOLUTION INTRAVENOUS at 14:32

## 2019-04-02 RX ADMIN — ESTRADIOL 0.5 MG: 1 TABLET ORAL at 14:33

## 2019-04-02 RX ADMIN — PAROXETINE 30 MG: 10 TABLET, FILM COATED ORAL at 21:24

## 2019-04-02 RX ADMIN — PROMETHAZINE HYDROCHLORIDE 12.5 MG: 25 INJECTION INTRAMUSCULAR; INTRAVENOUS at 03:06

## 2019-04-02 RX ADMIN — PROPOFOL 150 MG: 10 INJECTION, EMULSION INTRAVENOUS at 11:35

## 2019-04-02 RX ADMIN — MULTIVITAMIN 15 ML: LIQUID ORAL at 14:34

## 2019-04-02 RX ADMIN — MORPHINE SULFATE 2 MG: 4 INJECTION INTRAVENOUS at 05:03

## 2019-04-02 RX ADMIN — OYSTER SHELL CALCIUM WITH VITAMIN D 1 TABLET: 500; 200 TABLET, FILM COATED ORAL at 21:23

## 2019-04-02 RX ADMIN — SODIUM CHLORIDE: 9 INJECTION, SOLUTION INTRAVENOUS at 11:28

## 2019-04-02 RX ADMIN — METOPROLOL TARTRATE 1 MG: 1 INJECTION, SOLUTION INTRAVENOUS at 11:39

## 2019-04-02 RX ADMIN — ONDANSETRON 4 MG: 4 TABLET, ORALLY DISINTEGRATING ORAL at 12:14

## 2019-04-02 RX ADMIN — ONDANSETRON 4 MG: 4 TABLET, ORALLY DISINTEGRATING ORAL at 21:24

## 2019-04-02 RX ADMIN — GABAPENTIN 800 MG: 400 CAPSULE ORAL at 14:32

## 2019-04-02 RX ADMIN — PROPOFOL 30 MG: 10 INJECTION, EMULSION INTRAVENOUS at 11:50

## 2019-04-02 RX ADMIN — CLONIDINE HYDROCHLORIDE 0.1 MG: 0.1 TABLET ORAL at 14:32

## 2019-04-02 RX ADMIN — PROPOFOL 100 MG: 10 INJECTION, EMULSION INTRAVENOUS at 11:45

## 2019-04-02 RX ADMIN — MORPHINE SULFATE 2 MG: 4 INJECTION INTRAVENOUS at 21:45

## 2019-04-02 RX ADMIN — PANTOPRAZOLE SODIUM 8 MG/HR: 40 INJECTION, POWDER, FOR SOLUTION INTRAVENOUS at 22:43

## 2019-04-02 RX ADMIN — CLONIDINE HYDROCHLORIDE 0.1 MG: 0.1 TABLET ORAL at 21:23

## 2019-04-02 RX ADMIN — OYSTER SHELL CALCIUM WITH VITAMIN D 1 TABLET: 500; 200 TABLET, FILM COATED ORAL at 14:33

## 2019-04-02 ASSESSMENT — PAIN SCALES - GENERAL
PAINLEVEL_OUTOF10: 8
PAINLEVEL_OUTOF10: 9
PAINLEVEL_OUTOF10: 4
PAINLEVEL_OUTOF10: 8
PAINLEVEL_OUTOF10: 7

## 2019-04-02 NOTE — PROGRESS NOTES
03/31/19  1936   AST 39*   ALT 47*   BILITOT 0.2   ALKPHOS 115     No results for input(s): INR in the last 72 hours. No results for input(s): CKTOTAL, CKMB, CKMBINDEX, TROPONINT in the last 72 hours. No intake/output data recorded. No intake or output data in the 24 hours ending 04/02/19 1628     Assessment/Plan:    Gastroenteritis  Continue IV fluid resuscitation,  IV Protonix  IV Zofran and Phenergan as needed  Patient may benefit from an endoscopy if hematemesis persists.   H&H monitoring  Gen. surgery consult   Morphine as needed  De-escalates pain medication if not more evidence of hematemesis     Peripheral neuropathy  Continue on Neurontin     Hypothyroidism  Continue levothyroxine     Anxiety and depression  Continue on anxiolytics as well as antidepressants      DVT Prophylaxis:   Diet: DIET CLEAR LIQUID;  Code Status: Prior    Dispo:when stable    Electronically signed by Catherine Lopez MD on 4/2/2019 at 4:28 PM

## 2019-04-02 NOTE — PROGRESS NOTES
GENERAL SURGERY PROGRESS NOTE    CC/HPI:           Patient feels bad, and is not tolerating PO ? ? Vitals:    04/01/19 1558 04/01/19 1841 04/01/19 2047 04/02/19 0234   BP: (!) 95/43 (!) 102/57  137/69   Pulse: 61 61     Resp: 20 12 19 21   Temp: 97.6 °F (36.4 °C) 97.2 °F (36.2 °C) 98.3 °F (36.8 °C) 98.5 °F (36.9 °C)   TempSrc:  Oral Oral Oral   SpO2:  98% 98% 96%   Weight:       Height:         No intake/output data recorded. No intake/output data recorded.     Diet NPO Effective Now    Recent Results (from the past 48 hour(s))   Urinalysis    Collection Time: 03/31/19  7:35 PM   Result Value Ref Range    Color, UA YELLOW YELLOW    Clarity, UA CLEAR CLEAR    Glucose, Urine NEGATIVE NEGATIVE MG/DL    Bilirubin Urine NEGATIVE NEGATIVE MG/DL    Ketones, Urine NEGATIVE NEGATIVE MG/DL    Specific Gravity, UA 1.017 1.001 - 1.035    Blood, Urine NEGATIVE NEGATIVE    pH, Urine 5.0 5.0 - 8.0    Protein, UA NEGATIVE NEGATIVE MG/DL    Urobilinogen, Urine NORMAL 0.2 - 1.0 MG/DL    Nitrite Urine, Quantitative NEGATIVE NEGATIVE    Leukocyte Esterase, Urine NEGATIVE NEGATIVE    RBC, UA NONE SEEN 0 - 6 /HPF    WBC, UA 2 0 - 5 /HPF    Bacteria, UA NEGATIVE NEGATIVE /HPF    Squam Epithel, UA 11 /HPF    Mucus, UA RARE (A) NEGATIVE HPF    Trichomonas, UA NONE SEEN NONE SEEN /HPF   CBC auto diff    Collection Time: 03/31/19  7:36 PM   Result Value Ref Range    WBC 4.1 4.0 - 10.5 K/CU MM    RBC 4.08 (L) 4.2 - 5.4 M/CU MM    Hemoglobin 10.5 (L) 12.5 - 16.0 GM/DL    Hematocrit 34.4 (L) 37 - 47 %    MCV 84.3 78 - 100 FL    MCH 25.7 (L) 27 - 31 PG    MCHC 30.5 (L) 32.0 - 36.0 %    RDW 14.0 11.7 - 14.9 %    Platelets 428 213 - 902 K/CU MM    MPV 10.8 7.5 - 11.1 FL    Differential Type AUTOMATED DIFFERENTIAL     Segs Relative 43.3 36 - 66 %    Lymphocytes % 39.3 24 - 44 %    Monocytes % 7.5 (H) 0 - 4 %    Eosinophils % 9.2 (H) 0 - 3 %    Basophils % 0.7 0 - 1 %    Segs Absolute 1.8 K/CU MM    Lymphocytes # 1.6 K/CU MM Monocytes # 0.3 K/CU MM    Eosinophils # 0.4 K/CU MM    Basophils # 0.0 K/CU MM    Nucleated RBC % 0.0 %    Total Nucleated RBC 0.0 K/CU MM    Total Immature Neutrophil 0.00 K/CU MM    Immature Neutrophil % 0.0 0 - 0.43 %   CMP    Collection Time: 03/31/19  7:36 PM   Result Value Ref Range    Sodium 141 135 - 145 MMOL/L    Potassium 4.7 3.5 - 5.1 MMOL/L    Chloride 106 99 - 110 mMol/L    CO2 26 21 - 32 MMOL/L    BUN 15 6 - 23 MG/DL    CREATININE 0.7 0.6 - 1.1 MG/DL    Glucose 111 (H) 70 - 99 MG/DL    Calcium 9.7 8.3 - 10.6 MG/DL    Alb 4.1 3.4 - 5.0 GM/DL    Total Protein 6.9 6.4 - 8.2 GM/DL    Total Bilirubin 0.2 0.0 - 1.0 MG/DL    ALT 47 (H) 10 - 40 U/L    AST 39 (H) 15 - 37 IU/L    Alkaline Phosphatase 115 40 - 129 IU/L    GFR Non-African American >60 >60 mL/min/1.73m2    GFR African American >60 >60 mL/min/1.73m2    Anion Gap 9 4 - 16   Lipase    Collection Time: 03/31/19  7:36 PM   Result Value Ref Range    Lipase 19 13 - 60 IU/L   TYPE AND SCREEN    Collection Time: 03/31/19  7:36 PM   Result Value Ref Range    ABO/Rh A POSITIVE     Antibody Screen POSITIVE     Antibody Data  ANTI-E PRESENT     Unit Number [id number]     Component LEUKO-POOR RED CELLS     Unit Divison 00     Status ALLOCATED     Transfusion Status OK TO TRANSFUSE     Crossmatch Result COMPATIBLE     Unit Number [id number]     Component LEUKO-POOR RED CELLS     Unit Divison 00     Status ALLOCATED     Transfusion Status OK TO TRANSFUSE     Crossmatch Result COMPATIBLE    Lactic Acid, Plasma    Collection Time: 03/31/19  7:36 PM   Result Value Ref Range    Lactate 1.5 0.4 - 2.0 mMOL/L   Magnesium    Collection Time: 03/31/19  7:36 PM   Result Value Ref Range    Magnesium 2.0 1.8 - 2.4 mg/dl   Lactic Acid, Plasma    Collection Time: 03/31/19  9:05 PM   Result Value Ref Range    Lactate (HH) 0.4 - 2.0 mMOL/L     2.6  LACT CALLED TO DR Mike Maharaj AT 2141, Nguy[facility]n Melchor MLS  RESULTS READ BACK     Prolactin    Collection Time: 03/31/19  9:05 PM   Result erythema, No discharge. Principal Problem:    Pseudoseizure  Active Problems:    Lupus (systemic lupus erythematosus) ([address]    Morbid obesity with BMI of 50.0-59.9, adult (HCC)    Chronic pain syndrome    Drug-seeking/Aberrant behavior  Resolved Problems:    * No resolved hospital problems. *      Assessment and Plan:  Eliseo Julio is a 46 y.o. female who is status post:  A bariatric sleeve gatrectomy and loosing adeuate amount of weight, BUT c/o \"hematemesis\", even though her:    Her sleeve gastrectomy was on 2/12/19, and on 3/12/19 an EGD revealed NORMAL anatomy. Her Hb is stable at ~ 9.5 gm%. .  Might need to repeat her EGD. .. Increase Ambulation to at least 4x/day walk in the hallways with assistance. Respiratory cha-operative care: Incentive Spirometry / deep breathing and coughing 10x/hr while awake. Continue DVT prophylaxis with Teds and SCDs and SC Lovenox. Continue GI prophylaxis with Protonix IV till able to tolerate PO.    ___________________________________________    Rajni Arevalo MD, FACS, FICS  4/2/2019  6:35 AM    Patient was seen with total face to face time of 25 minutes. More than 50% of this visit was counseling and education as above in my assessment and plan section of my note.

## 2019-04-02 NOTE — PLAN OF CARE
Problem: Pain:  Goal: Pain level will decrease  Description  Pain level will decrease  4/2/2019 0108 by Candi Brooks RN  Outcome: Ongoing  4/1/2019 1214 by Kadi King RN  Outcome: Ongoing     Problem: Falls - Risk of:  Goal: Absence of physical injury  Description  Absence of physical injury  4/2/2019 0108 by Candi Brooks RN  Outcome: Ongoing  4/1/2019 1214 by Kadi King RN  Outcome: Ongoing

## 2019-04-02 NOTE — PLAN OF CARE
Problem: Pain:  Goal: Pain level will decrease  Description  Pain level will decrease  4/2/2019 1215 by Kathy Osorio, LPN  Outcome: Ongoing  4/2/2019 0108 by Manuel French RN  Outcome: Ongoing  Goal: Control of acute pain  Description  Control of acute pain  4/2/2019 1215 by Kathy Osorio LPN  Outcome: Ongoing  4/2/2019 0108 by Manuel French RN  Outcome: Ongoing  Goal: Control of chronic pain  Description  Control of chronic pain  4/2/2019 1215 by Kathy Osorio LPN  Outcome: Ongoing  4/2/2019 0108 by Manuel French RN  Outcome: Ongoing     Problem: Falls - Risk of:  Goal: Will remain free from falls  Description  Will remain free from falls  4/2/2019 1215 by Kathy Osorio LPN  Outcome: Ongoing  4/2/2019 0108 by Manuel French RN  Outcome: Ongoing  Goal: Absence of physical injury  Description  Absence of physical injury  4/2/2019 1215 by Kathy Osorio LPN  Outcome: Ongoing  4/2/2019 0108 by Manuel French RN  Outcome: Ongoing     Problem: Activity:  Goal: Risk for activity intolerance will decrease  Description  Risk for activity intolerance will decrease  4/2/2019 1215 by Kathy Osorio LPN  Outcome: Ongoing  4/2/2019 0108 by Manuel French RN  Outcome: Ongoing     Problem:  Bowel/Gastric:  Goal: Bowel function will improve  Description  Bowel function will improve  4/2/2019 1215 by Kathy Osorio LPN  Outcome: Ongoing  4/2/2019 0108 by Manuel French RN  Outcome: Ongoing  Goal: Diagnostic test results will improve  Description  Diagnostic test results will improve  4/2/2019 1215 by Kathy Osorio LPN  Outcome: Ongoing  4/2/2019 0108 by Manuel French RN  Outcome: Ongoing  Goal: Occurrences of nausea will decrease  Description  Occurrences of nausea will decrease  4/2/2019 1215 by Kathy Osorio LPN  Outcome: Ongoing  4/2/2019 0108 by Manuel French RN  Outcome: Ongoing  Goal: Occurrences of vomiting will decrease  Description  Occurrences of vomiting will improve  Description  Ability to notify healthcare provider of pain before it becomes unmanageable or unbearable will improve  4/2/2019 1215 by Héctor Ahmadi LPN  Outcome: Ongoing  4/2/2019 0108 by Sanjeev Membreno RN  Outcome: Ongoing     Problem: Nutrition  Goal: Optimal nutrition therapy  4/2/2019 1215 by Héctor Ahmadi LPN  Outcome: Ongoing  4/2/2019 0108 by Sanjeev Membreno RN  Outcome: Ongoing

## 2019-04-02 NOTE — PROGRESS NOTES
Call initiated by: Nursing staff:  Dae Blackman RN  Call addressed around: 4/1/2019 10:57 PM      Reason for call: \"Patient stated she vomited up blood. It looks to be fernando red blood. There also looks to be large clot in it. VS are stable though pt is c/o abd pain right sided 8/10. Had a CT of abd and it showed nothing. Could you please come and assess the pt you can also look at the emesis. \"      Notes: Patient seen and examined with RN at bedside. Emesis in the basin appears bright red blood emesis with large blood clots. Patient Hgb dropped from 10.5 to 9.5. Patient stated that she also vomited same amount of bloody emesis yesterday. She also reports having sleeve gastrectomy last February done by Dr. Delonte James. CT abd no acute abnormality. VS stable, no respiratory distress noted on room air. Orders placed: Consult Gen. Surgery, Dr. Delonte James for further evaluation of s/p sleeve gastrectomy, Morphine IV-once.       MICHAEL Durán - CNP

## 2019-04-02 NOTE — OP NOTE
OPERATIVE / PROCEDURE NOTE    Toshia, [facility], 1970, 46 y.o.,  female, CSN: [id number]  April 2, 2019    PRE-OP DIAGNOSIS: ? Hematemesis? 1.5 months s/p lap sleeve gastrectomy. POST-OP DIAGNOSIS: Same + Per the EGD performed all the way to the 3rd portion of the duodenum:  - Z-line was @ 40 cm from the superior incisors' level  - NO GERD stigmata noted, no obvious Hiatal Hernia, no changes suspicious Marsh's  - Positive gastritis - and a bleeding area in the distal staple line was noted. - No biliary reflux    PROCEDURE(S):   - EGD to the 3rd portion of the duodenum  - Epinephrine injection 10 ml CONTROLLED the bleeding mucosa. SURGEON(S):   Larissa Dwyer M.D., ERIK.C.S., F.I.C.S. ASSISTANT(S): NONE    ANESTHESIA: MAC per Anaesthesia. SPECIMENS: None. ESTIMATED BLOOD LOSS: None. COMPLICATIONS: NONE. CONDITION / DISPOSITION: Stable, to PACU. OPERATIVE DESCRIPTION: After properly informed consent was signed by the  patient, knowing all the risks, benefits, potential complications and  possible alternatives of the procedure, being overweight and having  some GERD symptoms. The patient who underwent a sleeve gastrectomy 1.5 months ago  was properly identified. She was brought to the GI suite and after  institution of general IV sedation in reverse Trendelenburg position, in the  left lateral decubitus position, the EGD Olympus scope was placed into her  mouth and under direct visualization was advanced. No reflux or laryngitis  stigmata noted. The esophagus was intubated. The Z-line was noted to be at  40 cm from the superior incisor level. No significant GERD stigmata noted. No   hiatal hernia was identified. No changes suspicious for Marsh esophagitis. The stomach was intubated. Blood old was noted, and gastritis was noted at the distal staple line. No  tumors, no growths, the area was injected with epinephrine x 3 for 10 ml total, till it controlled it perfectly.  The pylorus was intubated and  the scope was advanced all the way to the third portion of the duodenum. No  postpyloric abnormalities identified, including the ampulla and periampullary  regions. The scope was retracted back into the stomach, no hiatal hernia was identified. Documentary pictures of all the above  and below relevant findings were taken. The stomach was decompressed; the scope was retracted  out uneventfully. The patient tolerated the procedure well without any  complications and was sent to PACU in stable condition. She is losing adequate amount of  weight now with diet and exercise. Continue the same.  We will follow and reevaluate.     ____________________________________________    Constance Burns MD, FACS, FICS    4/2/2019  11:35 AM

## 2019-04-02 NOTE — ANESTHESIA PRE PROCEDURE
Department of Anesthesiology  Preprocedure Note       Name:  Charu Ortiz   Age:  46 y.o.  :  1970                                          MRN:  [id number]         Date:  2019      Surgeon: Jolene Allan):  Deangelo Rios MD    Procedure: EGD ESOPHAGOGASTRODUODENOSCOPY (N/A )    Medications prior to admission:   Prior to Admission medications    Medication Sig Start Date End Date Taking? Authorizing Provider   tiZANidine (ZANAFLEX) 4 MG tablet Take 4 mg by mouth 2 times daily as needed   Yes Historical Provider, MD   HYDROcodone-acetaminophen (NORCO) 7.5-325 MG per tablet Take 1 tablet by mouth every 8 hours as needed for Pain. Yes Historical Provider, MD   promethazine (PHENERGAN) 25 MG tablet Take 25 mg by mouth every 6 hours as needed for Nausea   Yes Historical Provider, MD   ondansetron (ZOFRAN) 4 MG tablet Take 4 mg by mouth every 8 hours as needed for Nausea or Vomiting   Yes Historical Provider, MD   estradiol (ESTRACE) 0.5 MG tablet Take 0.5 mg by mouth daily   Yes Historical Provider, MD   atenolol (TENORMIN) 25 MG tablet Take 25 mg by mouth daily   Yes Historical Provider, MD   cloNIDine (CATAPRES) 0.1 MG tablet Take 0.1 mg by mouth 2 times daily   Yes Historical Provider, MD   Multiple Vitamin (MVI, BARIATRIC ADVANTAGE MULTI-FORMULA, CHEW TAB) Take 1 tablet by mouth daily 19  Yes Deangelo Rios MD   Calcium Citrate-Vitamin D (CALCIUM + VIT D, BARIATRIC ADVANTAGE, CHEWABLE TABLET) Take 1 tablet by mouth 2 times daily 19  Yes Deangelo Rios MD   pantoprazole (PROTONIX) 40 MG tablet Take 1 tablet by mouth 2 times daily 19  Yes Deangelo Rios MD   Nutritional Supplements (ENSURE HIGH PROTEIN) LIQD Take 1 Can by mouth 4 times daily 2 week pre op diet. No other foods.  19  Yes MICHAEL Tidwell - CNP   albuterol sulfate HFA (VENTOLIN HFA) 108 (90 Base) MCG/ACT inhaler Inhale 2 puffs into the lungs every 6 hours as needed for Wheezing 10/18/18  Yes Reji Maci Wan MD   levothyroxine (SYNTHROID) 125 MCG tablet Take 1 tablet by mouth Daily  Patient taking differently: Take 125 mcg by mouth nightly  8/8/18  Yes Maci Cid MD   gabapentin (NEURONTIN) 800 MG tablet Take 800 mg by mouth 3 times daily   Yes Historical Provider, MD   dicyclomine (BENTYL) 20 MG tablet Take 20 mg by mouth every 6 hours    Historical Provider, MD   PARoxetine (PAXIL) 30 MG tablet Take 30 mg by mouth nightly     Historical Provider, MD       Current medications:    Current Facility-Administered Medications   Medication Dose Route Frequency Provider Last Rate Last Dose    morphine sulfate (PF) injection 2 mg  2 mg Intravenous Q2H PRN Maci Cid MD   2 mg at 04/02/19 1105    albuterol sulfate  (90 Base) MCG/ACT inhaler 2 puff  2 puff Inhalation Q6H PRN Efrem Anthony MD        atenolol (TENORMIN) tablet 25 mg  25 mg Oral Daily Efrem Anthony MD   25 mg at 04/01/19 1001    calcium-vitamin D (OSCAL-500) 500-200 MG-UNIT per tablet 1 tablet  1 tablet Oral BID Efrem Anthony MD   Stopped at 04/01/19 2223    cloNIDine (CATAPRES) tablet 0.1 mg  0.1 mg Oral BID Efrem Anthony MD   Stopped at 04/01/19 2223    dicyclomine (BENTYL) tablet 20 mg  20 mg Oral 4 times per day Efrem Anthony MD   Stopped at 04/01/19 2224    estradiol (ESTRACE) tablet 0.5 mg  0.5 mg Oral Daily Efrem Anthony MD   Stopped at 04/02/19 1021    gabapentin (NEURONTIN) capsule 800 mg  800 mg Oral TID Efrem Anthony MD   Stopped at 04/01/19 2224    levothyroxine (SYNTHROID) tablet 125 mcg  125 mcg Oral Daily Efrem Anthony MD   Stopped at 04/02/19 4304    CENTRUM/CERTA-LILLY with minerals oral solution 15 mL  15 mL Oral Daily Efrem Anthony MD   Stopped at 04/02/19 1020    ondansetron (ZOFRAN-ODT) disintegrating tablet 4 mg  4 mg Oral Q8H PRN Efrem Anthony MD   4 mg at 04/01/19 1331    PARoxetine (PAXIL) tablet 30 mg  30 mg Oral Nightly Efrem Anthony MD   Stopped at 04/01/19 7144    tiZANidine (ZANAFLEX) tablet 4 mg  4 mg Oral BID PRN Mardee Daily, MD        0.9 % sodium chloride infusion   Intravenous Continuous Mardee Daily,  mL/hr at 04/01/19 2323      pantoprazole (PROTONIX) injection 40 mg  40 mg Intravenous BID Mardee Daily, MD   Stopped at 04/02/19 1021    promethazine (PHENERGAN) injection 12.5 mg  12.5 mg Intramuscular Q6H PRN Mardee Daily, MD   12.5 mg at 04/02/19 1008    [Held by provider] HYDROcodone-acetaminophen ([facility]) 7.5-325 MG per tablet 1 tablet  1 tablet Oral Q8H PRN Tirso Alvarenga DO   1 tablet at 04/01/19 1331    hyoscyamine (LEVSIN/SL) sublingual tablet 125 mcg  125 mcg Sublingual Q4H PRN Aminah Fung PA-C   125 mcg at 03/31/19 2038       Allergies:     Allergies   Allergen Reactions    Aspirin Palpitations     \"My Heart Rate Elevates\"    Compazine [Prochlorperazine Maleate] Rash    Reglan [Metoclopramide Hcl] Rash    Shellfish-Derived Products Swelling    Toradol [Ketorolac Tromethamine] Rash       Problem List:    Patient Active Problem List   Diagnosis Code    Abdominal pain R10.9    Rectal bleeding K62.5    Fever R50.9    Hematemesis K92.0    Fibromyalgia M79.7    Lupus (systemic lupus erythematosus) ([facility]) M32.9    Nausea & vomiting R11.2    Chest pain R07.9    Chronic pancreatitis (HCC) K86.1    HTN (hypertension) I10    Acute pancreatitis K85.90    Right-sided chest pain R07.9    Super obesity E66.9    Normocytic anemia D64.9    Hypokalemia E87.6    Generalized abdominal pain R10.84    Pneumonia of both lungs due to infectious organism J18.9    Foot ulcer, left (HCC) L97.529    SLE (systemic lupus erythematosus related syndrome) ([facility]) M32.9    Hypoxia R09.02    Cellulitis L03.90    Pancytopenia (HCC) D61.818    Pleurisy R09.1    Frequent UTI N39.0    Gastroesophageal reflux disease without esophagitis K21.9    MRSA cellulitis of left foot L03.116, B95.62    Depression F32.9    Fatty liver K76.0    Fatty liver disease, nonalcoholic C13.2  Arthritis M19.90    Bilateral low back pain with left-sided sciatica M54.42    Morbid obesity with BMI of 50.0-59.9, adult ([facility]) E66.01, Z68.43    Intractable vomiting with nausea R11.2    Class 3 obesity in adult [id number]    Hiatal hernia K44.9    Poor intravenous access Z78.9    Post-operative nausea and vomiting R11.2, Z98.890    Status post bariatric surgery Z98.84    Status post laparoscopic sleeve gastrectomy Z98.84    Abscess L02.91    Dysphagia R13.10    Pseudoseizure F44.5    Chronic pain syndrome G89.4    Drug-seeking/Aberrant behavior Z76.5       Past Medical History:        Diagnosis Date    Acid reflux     Anemia     Anxiety     Arthritis     Hands, Back And Ankles    Bleeding ulcer 2014    \"I Had Ulcers In My Stomach And Colon\"    Bronchitis Last Episode 2014    Chronic back pain     [facility]    COPD (chronic obstructive pulmonary disease) ([facility]     Sees Dr. Campbell Brambila    Depression     Fibromyalgia Dx 2013    H/O echocardiogram 8/11/15    EF >55%. LA to be at the upper limit of normal in size. LV hypertrophy with normal LV systolic, but abnormal diastolic function. Normal valvular structures and function.  H/O echocardiogram 08/30/2018    EF 55-60%    Hiatal hernia     History of blood transfusion 09/2015 And 2018    No Reaction To Blood Transfusions Received    [place] (hard of hearing)     Right Ear    Hx of cardiac catheterization 10/24/2010    Angiographically normal coronary arteries w/ normal LV function and wall motion.  Hx of transesophageal echocardiography (PILO) for monitoring 12/2/2010    EF 55-60%. WNL.     Hypertension     Lupus Dx 2013    \"Rheumatoid Lupus\"    Morbid obesity ([facility]     Nausea     \"Most Of The Time\"    Pain management     Sees Dr. Dinesh Cordero, Once A Month    Pancreatitis chronic Dx 2001    Pneumonia Dx 11-15    Shortness of breath on exertion     Shortness of breath on exertion     Sleep apnea     Has CPAP    Staph infection Dx 11-15    Left Foot    Staph infection Dx 11-15    \"Left Foot\"    Teeth missing     Upper And Lower    Thyroid disease     UTI (urinary tract infection) In Past    No Current Symptoms    Wears glasses     To Read       Past Surgical History:        Procedure Laterality Date    APPENDECTOMY  1998    Done When Tubes And Ovaries Were Removed    CARDIAC CATHETERIZATION  10/24/2010     SECTION  1991    CHOLECYSTECTOMY, LAPAROSCOPIC      COLONOSCOPY  Last Done In     DENTAL SURGERY      Teeth Extracted In Past    ENDOSCOPY, COLON, DIAGNOSTIC  Last Done In     FOOT SURGERY Left Last Done In      Dr. Joe Trinh, \" About 6 Surgeries Done Because Of Staph Infection\"    HIATAL HERNIA REPAIR N/A 2019    HERNIA HIATAL LAPAROSCOPIC ROBOTIC performed by Xochitl Darnell MD at [address]  10/1997    Partial Abdominal Hysterectomy    LAP BAND  ~2000    removed after 6 months    LUNG REMOVAL, PARTIAL Left     Benign    OTHER SURGICAL HISTORY  1998    \"Tubes And Ovaries Removed, Appendectomy Also Done\"    OTHER SURGICAL HISTORY  Last Done 7-15-16    Mediport Insertion \"Total Of Six Done, Removed Last Mediport In \"    SLEEVE GASTRECTOMY N/A 2019    GASTRECTOMY SLEEVE LAPAROSCOPIC ROBOTIC performed by Xochitl Darnell MD at [address]  2018       Social History:    Social History     Tobacco Use    Smoking status: Never Smoker    Smokeless tobacco: Never Used   Substance Use Topics    Alcohol use: No     Alcohol/week: 0.0 oz                                Counseling given: Not Answered      Vital Signs (Current):   Vitals:    19 2047 19 0234 19 1000 19 1013   BP:  137/69 134/66    Pulse:   59 64   Resp:     Temp: 36.8 °C (98.3 °F) 36.9 °C (98.5 °F) 36.7 °C (98 °F)    TempSrc: Oral Oral Oral SpO2: 98% 96%     Weight:       Height:                                                  BP Readings from Last 3 Encounters:   04/02/19 134/66   03/20/19 129/67   03/16/19 (!) 131/90       NPO Status:                                                                                 BMI:   Wt Readings from Last 3 Encounters:   04/01/19 288 lb (130.6 kg)   03/20/19 290 lb (131.5 kg)   03/16/19 294 lb (133.4 kg)     Body mass index is 49.44 kg/m². CBC:   Lab Results   Component Value Date    WBC 3.1 04/01/2019    RBC 3.75 04/01/2019    HGB 9.5 04/01/2019    HCT 34.4 04/01/2019    MCV 91.7 04/01/2019    RDW 14.0 04/01/2019     04/01/2019       CMP:   Lab Results   Component Value Date     04/01/2019    K 4.3 04/01/2019     04/01/2019    CO2 21 04/01/2019    BUN 11 04/01/2019    CREATININE 0.6 04/01/2019    GFRAA >60 04/01/2019    LABGLOM >60 04/01/2019    GLUCOSE 98 04/01/2019    PROT 6.9 03/31/2019    PROT 6.5 11/18/2011    CALCIUM 9.0 04/01/2019    BILITOT 0.2 03/31/2019    ALKPHOS 115 03/31/2019    AST 39 03/31/2019    ALT 47 03/31/2019       POC Tests:   Recent Labs     04/01/19  1841   POCGLU 102*       Coags:   Lab Results   Component Value Date    PROTIME 11.3 03/20/2019    PROTIME 11.4 12/01/2010    INR 0.99 03/20/2019    APTT 26.4 03/20/2019       HCG (If Applicable):   Lab Results   Component Value Date    PREGTESTUR NEGATIVE 07/10/2017        ABGs: No results found for: PHART, PO2ART, HJF0ICR, QIF4AMC, BEART, [id number]     Type & Screen (If Applicable):  No results found for: LABABO, [address]    Anesthesia Evaluation  Patient summary reviewed   history of anesthetic complications: PONV.   Airway: Mallampati: II  TM distance: >3 FB   Neck ROM: full  Mouth opening: > = 3 FB Dental:          Pulmonary:   (+) COPD:  sleep apnea:                             Cardiovascular:    (+) hypertension:, PARSONS:,       ECG reviewed      Echocardiogram reviewed         Beta Blocker:  Not on Beta Blocker      ROS comment: Left Ventricle   Normal left ventricle structure and systolic function with an ejection   fraction of 55-60%.   No regional wall motion abnormalities were detected.   Grade I diastolic dysfunction.        Neuro/Psych:   (+) seizures:, neuromuscular disease:, depression/anxiety             GI/Hepatic/Renal:   (+) hiatal hernia, GERD:, PUD,           Endo/Other:    (+) blood dyscrasia: anemia, arthritis:., .          Pt had no PAT visit       Abdominal:           Vascular:                                      Anesthesia Plan      MAC     ASA 3       Induction: intravenous. Anesthetic plan and risks discussed with patient. Plan discussed with attending.                   MICHAEL Parks - CRNA   4/2/2019

## 2019-04-03 LAB
ANION GAP SERPL CALCULATED.3IONS-SCNC: 13 MMOL/L (ref 4–16)
BUN BLDV-MCNC: 4 MG/DL (ref 6–23)
CALCIUM SERPL-MCNC: 10 MG/DL (ref 8.3–10.6)
CHLORIDE BLD-SCNC: 109 MMOL/L (ref 99–110)
CO2: 20 MMOL/L (ref 21–32)
CREAT SERPL-MCNC: 0.6 MG/DL (ref 0.6–1.1)
GFR AFRICAN AMERICAN: >60 ML/MIN/1.73M2
GFR NON-AFRICAN AMERICAN: >60 ML/MIN/1.73M2
GLUCOSE BLD-MCNC: 99 MG/DL (ref 70–99)
HCT VFR BLD CALC: 39 % (ref 37–47)
HEMOGLOBIN: 10.8 GM/DL (ref 12.5–16)
MCH RBC QN AUTO: 25.5 PG (ref 27–31)
MCHC RBC AUTO-ENTMCNC: 27.7 % (ref 32–36)
MCV RBC AUTO: 92.2 FL (ref 78–100)
PDW BLD-RTO: 14 % (ref 11.7–14.9)
PLATELET # BLD: 154 K/CU MM (ref 140–440)
PMV BLD AUTO: 11 FL (ref 7.5–11.1)
POTASSIUM SERPL-SCNC: 4.1 MMOL/L (ref 3.5–5.1)
RBC # BLD: 4.23 M/CU MM (ref 4.2–5.4)
SODIUM BLD-SCNC: 142 MMOL/L (ref 135–145)
WBC # BLD: 3.5 K/CU MM (ref 4–10.5)

## 2019-04-03 PROCEDURE — 2580000003 HC RX 258: Performed by: SURGERY

## 2019-04-03 PROCEDURE — 6370000000 HC RX 637 (ALT 250 FOR IP): Performed by: SURGERY

## 2019-04-03 PROCEDURE — 36415 COLL VENOUS BLD VENIPUNCTURE: CPT

## 2019-04-03 PROCEDURE — 6360000002 HC RX W HCPCS: Performed by: HOSPITALIST

## 2019-04-03 PROCEDURE — 85027 COMPLETE CBC AUTOMATED: CPT

## 2019-04-03 PROCEDURE — 6360000002 HC RX W HCPCS: Performed by: SURGERY

## 2019-04-03 PROCEDURE — 99232 SBSQ HOSP IP/OBS MODERATE 35: CPT | Performed by: SURGERY

## 2019-04-03 PROCEDURE — 6360000002 HC RX W HCPCS: Performed by: NURSE PRACTITIONER

## 2019-04-03 PROCEDURE — C9113 INJ PANTOPRAZOLE SODIUM, VIA: HCPCS | Performed by: SURGERY

## 2019-04-03 PROCEDURE — 2060000000 HC ICU INTERMEDIATE R&B

## 2019-04-03 PROCEDURE — 80048 BASIC METABOLIC PNL TOTAL CA: CPT

## 2019-04-03 RX ORDER — ACETAMINOPHEN 80 MG
TABLET,CHEWABLE ORAL
Status: COMPLETED
Start: 2019-04-03 | End: 2019-04-03

## 2019-04-03 RX ORDER — MORPHINE SULFATE 4 MG/ML
1 INJECTION, SOLUTION INTRAMUSCULAR; INTRAVENOUS ONCE
Status: COMPLETED | OUTPATIENT
Start: 2019-04-03 | End: 2019-04-03

## 2019-04-03 RX ORDER — HEPARIN SODIUM (PORCINE) LOCK FLUSH IV SOLN 100 UNIT/ML 100 UNIT/ML
500 SOLUTION INTRAVENOUS ONCE
Status: COMPLETED | OUTPATIENT
Start: 2019-04-03 | End: 2019-04-03

## 2019-04-03 RX ORDER — MORPHINE SULFATE 4 MG/ML
2 INJECTION, SOLUTION INTRAMUSCULAR; INTRAVENOUS ONCE
Status: COMPLETED | OUTPATIENT
Start: 2019-04-03 | End: 2019-04-03

## 2019-04-03 RX ADMIN — SODIUM CHLORIDE, PRESERVATIVE FREE 500 UNITS: 5 INJECTION INTRAVENOUS at 11:25

## 2019-04-03 RX ADMIN — PROMETHAZINE HYDROCHLORIDE 12.5 MG: 25 INJECTION INTRAMUSCULAR; INTRAVENOUS at 05:34

## 2019-04-03 RX ADMIN — SODIUM CHLORIDE: 9 INJECTION, SOLUTION INTRAVENOUS at 12:36

## 2019-04-03 RX ADMIN — PANTOPRAZOLE SODIUM 8 MG/HR: 40 INJECTION, POWDER, FOR SOLUTION INTRAVENOUS at 20:36

## 2019-04-03 RX ADMIN — GABAPENTIN 800 MG: 400 CAPSULE ORAL at 20:36

## 2019-04-03 RX ADMIN — PROMETHAZINE HYDROCHLORIDE 12.5 MG: 25 INJECTION INTRAMUSCULAR; INTRAVENOUS at 17:50

## 2019-04-03 RX ADMIN — OYSTER SHELL CALCIUM WITH VITAMIN D 1 TABLET: 500; 200 TABLET, FILM COATED ORAL at 09:11

## 2019-04-03 RX ADMIN — MORPHINE SULFATE 1 MG: 4 INJECTION INTRAVENOUS at 22:07

## 2019-04-03 RX ADMIN — DICYCLOMINE HYDROCHLORIDE 20 MG: 20 TABLET ORAL at 17:50

## 2019-04-03 RX ADMIN — MORPHINE SULFATE 2 MG: 4 INJECTION INTRAVENOUS at 02:02

## 2019-04-03 RX ADMIN — GABAPENTIN 800 MG: 400 CAPSULE ORAL at 09:11

## 2019-04-03 RX ADMIN — OYSTER SHELL CALCIUM WITH VITAMIN D 1 TABLET: 500; 200 TABLET, FILM COATED ORAL at 20:36

## 2019-04-03 RX ADMIN — MULTIVITAMIN 15 ML: LIQUID ORAL at 11:43

## 2019-04-03 RX ADMIN — MORPHINE SULFATE 2 MG: 4 INJECTION INTRAVENOUS at 11:25

## 2019-04-03 RX ADMIN — GABAPENTIN 800 MG: 400 CAPSULE ORAL at 14:16

## 2019-04-03 RX ADMIN — CLONIDINE HYDROCHLORIDE 0.1 MG: 0.1 TABLET ORAL at 09:12

## 2019-04-03 RX ADMIN — DICYCLOMINE HYDROCHLORIDE 20 MG: 20 TABLET ORAL at 12:36

## 2019-04-03 RX ADMIN — Medication: at 11:45

## 2019-04-03 RX ADMIN — DICYCLOMINE HYDROCHLORIDE 20 MG: 20 TABLET ORAL at 05:34

## 2019-04-03 RX ADMIN — PANTOPRAZOLE SODIUM 8 MG/HR: 40 INJECTION, POWDER, FOR SOLUTION INTRAVENOUS at 05:32

## 2019-04-03 RX ADMIN — SODIUM CHLORIDE: 9 INJECTION, SOLUTION INTRAVENOUS at 00:28

## 2019-04-03 RX ADMIN — DICYCLOMINE HYDROCHLORIDE 20 MG: 20 TABLET ORAL at 01:15

## 2019-04-03 RX ADMIN — PAROXETINE 30 MG: 10 TABLET, FILM COATED ORAL at 20:37

## 2019-04-03 RX ADMIN — ESTRADIOL 0.5 MG: 1 TABLET ORAL at 11:42

## 2019-04-03 RX ADMIN — SODIUM CHLORIDE: 9 INJECTION, SOLUTION INTRAVENOUS at 20:38

## 2019-04-03 RX ADMIN — LEVOTHYROXINE SODIUM 125 MCG: 0.12 TABLET ORAL at 05:34

## 2019-04-03 RX ADMIN — MORPHINE SULFATE 2 MG: 4 INJECTION INTRAVENOUS at 06:22

## 2019-04-03 ASSESSMENT — PAIN SCALES - GENERAL
PAINLEVEL_OUTOF10: 6
PAINLEVEL_OUTOF10: 8
PAINLEVEL_OUTOF10: 7

## 2019-04-03 ASSESSMENT — PAIN DESCRIPTION - PAIN TYPE: TYPE: ACUTE PAIN

## 2019-04-03 NOTE — PROGRESS NOTES
03/31/19  1936   AST 39*   ALT 47*   BILITOT 0.2   ALKPHOS 115     No results for input(s): INR in the last 72 hours. No results for input(s): CKTOTAL, CKMB, CKMBINDEX, TROPONINT in the last 72 hours. No intake/output data recorded. No intake or output data in the 24 hours ending 04/03/19 0699     Assessment/Plan:    Gastroenteritis  Continue IV fluid resuscitation,  IV Protonix  IV Zofran and Phenergan as needed  Patient may benefit from an endoscopy if hematemesis persists.   H&H monitoring  Gen. surgery consult   Morphine as needed  De-escalates pain medication if not more evidence of hematemesis     Peripheral neuropathy  Continue on Neurontin     Hypothyroidism  Continue levothyroxine     Anxiety and depression  Continue on anxiolytics as well as antidepressants      DVT Prophylaxis:   Diet: DIET BARIATRIC FULL LIQUID;  Dietary Nutrition Supplements: Standard Oral Supplement  Code Status: Prior    Dispo: tomorrow if continue to do well    Electronically signed by Maximus Topete MD on 4/3/2019 at 1:39 PM

## 2019-04-03 NOTE — PROGRESS NOTES
GENERAL SURGERY PROGRESS NOTE    CC/HPI:           Patient feels better, and Hb stable. Vitals:    04/02/19 2011 04/03/19 0204 04/03/19 0745 04/03/19 0754   BP: (!) 96/50 (!) 108/54 (!) 117/59    Pulse: 60 59 60 60   Resp: 14 15 15    Temp:  98.1 °F (36.7 °C) 97.4 °F (36.3 °C)    TempSrc:  Oral Oral    SpO2: 100%  96%    Weight:  292 lb 2 oz (132.5 kg)     Height:  5' 4\" (1.626 m)       No intake/output data recorded. No intake/output data recorded.     DIET BARIATRIC FULL LIQUID;    Recent Results (from the past 48 hour(s))   CBC    Collection Time: 04/01/19  8:32 AM   Result Value Ref Range    WBC 3.1 (L) 4.0 - 10.5 K/CU MM    RBC 3.75 (L) 4.2 - 5.4 M/CU MM    Hemoglobin 9.5 (L) 12.5 - 16.0 GM/DL    Hematocrit 34.4 (L) 37 - 47 %    MCV 91.7 78 - 100 FL    MCH 25.3 (L) 27 - 31 PG    MCHC 27.6 (L) 32.0 - 36.0 %    RDW 14.0 11.7 - 14.9 %    Platelets 962 364 - 050 K/CU MM    MPV 10.7 7.5 - 11.1 FL   Basic Metabolic Panel    Collection Time: 04/01/19  8:32 AM   Result Value Ref Range    Sodium 140 135 - 145 MMOL/L    Potassium 4.3 3.5 - 5.1 MMOL/L    Chloride 109 99 - 110 mMol/L    CO2 21 21 - 32 MMOL/L    Anion Gap 10 4 - 16    BUN 11 6 - 23 MG/DL    CREATININE 0.6 0.6 - 1.1 MG/DL    Glucose 98 70 - 99 MG/DL    Calcium 9.0 8.3 - 10.6 MG/DL    GFR Non-African American >60 >60 mL/min/1.73m2    GFR African American >60 >60 mL/min/1.73m2   Lactic Acid, Plasma    Collection Time: 04/01/19  8:32 AM   Result Value Ref Range    Lactate 0.9 0.4 - 2.0 mMOL/L   POCT Glucose    Collection Time: 04/01/19  6:41 PM   Result Value Ref Range    POC Glucose 102 (H) 70 - 99 MG/DL   Basic Metabolic Panel    Collection Time: 04/02/19 11:12 AM   Result Value Ref Range    Sodium 142 135 - 145 MMOL/L    Potassium 4.0 3.5 - 5.1 MMOL/L    Chloride 108 99 - 110 mMol/L    CO2 27 21 - 32 MMOL/L    Anion Gap 7 4 - 16    BUN 6 6 - 23 MG/DL    CREATININE 0.6 0.6 - 1.1 MG/DL    Glucose 95 70 - 99 MG/DL    Calcium 9.7 8.3 - 10.6 MG/DL GFR Non-African American >60 >60 mL/min/1.73m2    GFR African American >60 >60 mL/min/1.73m2   CBC    Collection Time: 04/02/19 11:12 AM   Result Value Ref Range    WBC 3.1 (L) 4.0 - 10.5 K/CU MM    RBC 3.63 (L) 4.2 - 5.4 M/CU MM    Hemoglobin 9.4 (L) 12.5 - 16.0 GM/DL    Hematocrit 31.0 (L) 37 - 47 %    MCV 85.4 78 - 100 FL    MCH 25.9 (L) 27 - 31 PG    MCHC 30.3 (L) 32.0 - 36.0 %    RDW 14.0 11.7 - 14.9 %    Platelets 278 222 - 344 K/CU MM    MPV 10.6 7.5 - 11.1 FL   Lipase    Collection Time: 04/02/19 11:12 AM   Result Value Ref Range    Lipase 13 13 - 60 IU/L       Scheduled Meds:   atenolol  25 mg Oral Daily    calcium-vitamin D  1 tablet Oral BID    cloNIDine  0.1 mg Oral BID    dicyclomine  20 mg Oral 4 times per day    estradiol  0.5 mg Oral Daily    gabapentin  800 mg Oral TID    levothyroxine  125 mcg Oral Daily    CENTRUM/CERTA-LILLY with minerals oral  15 mL Oral Daily    PARoxetine  30 mg Oral Nightly       Continuous Infusions:   pantoprozole (PROTONIX) infusion 8 mg/hr (04/03/19 0532)    sodium chloride 100 mL/hr at 04/03/19 0028       Physical Exam:  HEENT: Anicteric sclerae, Oropharyngeal mucosae moist, pink and intact. Heart:  Normal S1 and S2, RRR  Lungs: Clear to auscultation bilaterally, No audible Wheezes or Rales. Extremities: No edema. Neuro: Alert and Oriented x 3, Non focal.  Abdomen: Soft, Benign, Non tender, Non distended, Positive bowel sounds. Incision: Nicely healing: No erythema, No discharge. Principal Problem:    Pseudoseizure  Active Problems:    Lupus (systemic lupus erythematosus) ([address]    Morbid obesity with BMI of 50.0-59.9, adult (HCC)    Chronic pain syndrome    Drug-seeking/Aberrant behavior  Resolved Problems:    * No resolved hospital problems.  *      Assessment and Plan:  Tariq Tran is a 46 y.o. female who is status post:  A bariatric sleeve gatrectomy and loosing adequate amount of weight, BUT c/o \"hematemesis\", EGD yesterday revealed a bleeding mucosa at the staple line, injected and well controlled, may be discharged IF her next Hb is stable. AND continue PPIs x 4-6 wks. ___________________________________________    Ayla Haley MD, FACS, FICS  4/3/2019  8:12 AM    Patient was seen with total face to face time of 25 minutes. More than 50% of this visit was counseling and education as above in my assessment and plan section of my note.

## 2019-04-03 NOTE — PROGRESS NOTES
I am working as a [facility] clinical instructor today. I have a student assigned to this patient.

## 2019-04-03 NOTE — PROGRESS NOTES
[address] Liaison spoke with patient, please order hhc at discharge. Patient is active with [address]. Please re-order at discharge if appropriate. Thank You.

## 2019-04-04 VITALS
HEIGHT: 64 IN | WEIGHT: 293 LBS | OXYGEN SATURATION: 98 % | DIASTOLIC BLOOD PRESSURE: 83 MMHG | TEMPERATURE: 97.7 F | SYSTOLIC BLOOD PRESSURE: 137 MMHG | RESPIRATION RATE: 15 BRPM | BODY MASS INDEX: 50.02 KG/M2 | HEART RATE: 73 BPM

## 2019-04-04 LAB
ABO/RH: NORMAL
ANION GAP SERPL CALCULATED.3IONS-SCNC: 6 MMOL/L (ref 4–16)
ANTIBODY DATA: NORMAL
ANTIBODY SCREEN: POSITIVE
BUN BLDV-MCNC: 5 MG/DL (ref 6–23)
CALCIUM SERPL-MCNC: 10.3 MG/DL (ref 8.3–10.6)
CHLORIDE BLD-SCNC: 107 MMOL/L (ref 99–110)
CO2: 25 MMOL/L (ref 21–32)
COMPONENT: NORMAL
COMPONENT: NORMAL
CREAT SERPL-MCNC: 0.6 MG/DL (ref 0.6–1.1)
CROSSMATCH RESULT: NORMAL
CROSSMATCH RESULT: NORMAL
GFR AFRICAN AMERICAN: >60 ML/MIN/1.73M2
GFR NON-AFRICAN AMERICAN: >60 ML/MIN/1.73M2
GLUCOSE BLD-MCNC: 84 MG/DL (ref 70–99)
HCT VFR BLD CALC: 29.8 % (ref 37–47)
HCT VFR BLD CALC: 30.5 % (ref 37–47)
HEMOGLOBIN: 9 GM/DL (ref 12.5–16)
HEMOGLOBIN: 9.2 GM/DL (ref 12.5–16)
MCH RBC QN AUTO: 25.6 PG (ref 27–31)
MCH RBC QN AUTO: 25.8 PG (ref 27–31)
MCHC RBC AUTO-ENTMCNC: 30.2 % (ref 32–36)
MCHC RBC AUTO-ENTMCNC: 30.2 % (ref 32–36)
MCV RBC AUTO: 85 FL (ref 78–100)
MCV RBC AUTO: 85.4 FL (ref 78–100)
PDW BLD-RTO: 14.1 % (ref 11.7–14.9)
PDW BLD-RTO: 14.2 % (ref 11.7–14.9)
PLATELET # BLD: 132 K/CU MM (ref 140–440)
PLATELET # BLD: 163 K/CU MM (ref 140–440)
PMV BLD AUTO: 10.8 FL (ref 7.5–11.1)
PMV BLD AUTO: 10.9 FL (ref 7.5–11.1)
POTASSIUM SERPL-SCNC: 3.8 MMOL/L (ref 3.5–5.1)
RBC # BLD: 3.49 M/CU MM (ref 4.2–5.4)
RBC # BLD: 3.59 M/CU MM (ref 4.2–5.4)
SODIUM BLD-SCNC: 138 MMOL/L (ref 135–145)
STATUS: NORMAL
STATUS: NORMAL
TRANSFUSION STATUS: NORMAL
TRANSFUSION STATUS: NORMAL
UNIT DIVISION: 0
UNIT DIVISION: 0
UNIT NUMBER: NORMAL
UNIT NUMBER: NORMAL
WBC # BLD: 3.1 K/CU MM (ref 4–10.5)
WBC # BLD: 3.3 K/CU MM (ref 4–10.5)

## 2019-04-04 PROCEDURE — 2580000003 HC RX 258: Performed by: SURGERY

## 2019-04-04 PROCEDURE — 6360000002 HC RX W HCPCS: Performed by: SURGERY

## 2019-04-04 PROCEDURE — 6370000000 HC RX 637 (ALT 250 FOR IP): Performed by: SURGERY

## 2019-04-04 PROCEDURE — 6370000000 HC RX 637 (ALT 250 FOR IP): Performed by: HOSPITALIST

## 2019-04-04 PROCEDURE — 6360000002 HC RX W HCPCS: Performed by: HOSPITALIST

## 2019-04-04 PROCEDURE — C9113 INJ PANTOPRAZOLE SODIUM, VIA: HCPCS | Performed by: SURGERY

## 2019-04-04 PROCEDURE — 36415 COLL VENOUS BLD VENIPUNCTURE: CPT

## 2019-04-04 PROCEDURE — 85027 COMPLETE CBC AUTOMATED: CPT

## 2019-04-04 PROCEDURE — 99232 SBSQ HOSP IP/OBS MODERATE 35: CPT | Performed by: SURGERY

## 2019-04-04 PROCEDURE — 80048 BASIC METABOLIC PNL TOTAL CA: CPT

## 2019-04-04 PROCEDURE — 94761 N-INVAS EAR/PLS OXIMETRY MLT: CPT

## 2019-04-04 RX ORDER — PANTOPRAZOLE SODIUM 20 MG/1
40 TABLET, DELAYED RELEASE ORAL 2 TIMES DAILY
Qty: 60 TABLET | Refills: 0 | Status: ON HOLD | OUTPATIENT
Start: 2019-04-04 | End: 2019-07-23 | Stop reason: SDUPTHER

## 2019-04-04 RX ORDER — PANTOPRAZOLE SODIUM 40 MG/1
40 TABLET, DELAYED RELEASE ORAL 2 TIMES DAILY
Qty: 60 TABLET | Refills: 3 | OUTPATIENT
Start: 2019-04-04

## 2019-04-04 RX ORDER — HEPARIN SODIUM (PORCINE) LOCK FLUSH IV SOLN 100 UNIT/ML 100 UNIT/ML
500 SOLUTION INTRAVENOUS ONCE
Status: COMPLETED | OUTPATIENT
Start: 2019-04-04 | End: 2019-04-04

## 2019-04-04 RX ORDER — HYDROCODONE BITARTRATE AND ACETAMINOPHEN 7.5; 325 MG/1; MG/1
1 TABLET ORAL EVERY 6 HOURS PRN
Qty: 12 TABLET | Refills: 0 | Status: SHIPPED | OUTPATIENT
Start: 2019-04-04 | End: 2019-04-07

## 2019-04-04 RX ORDER — HYDROCODONE BITARTRATE AND ACETAMINOPHEN 7.5; 325 MG/1; MG/1
1 TABLET ORAL EVERY 8 HOURS PRN
Status: DISCONTINUED | OUTPATIENT
Start: 2019-04-04 | End: 2019-04-04 | Stop reason: HOSPADM

## 2019-04-04 RX ADMIN — SODIUM CHLORIDE, PRESERVATIVE FREE 500 UNITS: 5 INJECTION INTRAVENOUS at 17:55

## 2019-04-04 RX ADMIN — ATENOLOL 25 MG: 25 TABLET ORAL at 10:51

## 2019-04-04 RX ADMIN — SODIUM CHLORIDE: 9 INJECTION, SOLUTION INTRAVENOUS at 08:23

## 2019-04-04 RX ADMIN — OYSTER SHELL CALCIUM WITH VITAMIN D 1 TABLET: 500; 200 TABLET, FILM COATED ORAL at 10:51

## 2019-04-04 RX ADMIN — PROMETHAZINE HYDROCHLORIDE 12.5 MG: 25 INJECTION INTRAMUSCULAR; INTRAVENOUS at 10:56

## 2019-04-04 RX ADMIN — CLONIDINE HYDROCHLORIDE 0.1 MG: 0.1 TABLET ORAL at 10:51

## 2019-04-04 RX ADMIN — PROMETHAZINE HYDROCHLORIDE 12.5 MG: 25 INJECTION INTRAMUSCULAR; INTRAVENOUS at 00:20

## 2019-04-04 RX ADMIN — PANTOPRAZOLE SODIUM 8 MG/HR: 40 INJECTION, POWDER, FOR SOLUTION INTRAVENOUS at 08:25

## 2019-04-04 RX ADMIN — HYDROCODONE BITARTRATE AND ACETAMINOPHEN 1 TABLET: 7.5; 325 TABLET ORAL at 12:12

## 2019-04-04 RX ADMIN — DICYCLOMINE HYDROCHLORIDE 20 MG: 20 TABLET ORAL at 12:12

## 2019-04-04 RX ADMIN — LEVOTHYROXINE SODIUM 125 MCG: 0.12 TABLET ORAL at 06:45

## 2019-04-04 RX ADMIN — SODIUM CHLORIDE: 9 INJECTION, SOLUTION INTRAVENOUS at 06:45

## 2019-04-04 RX ADMIN — ESTRADIOL 0.5 MG: 1 TABLET ORAL at 10:50

## 2019-04-04 RX ADMIN — GABAPENTIN 800 MG: 400 CAPSULE ORAL at 10:50

## 2019-04-04 RX ADMIN — DICYCLOMINE HYDROCHLORIDE 20 MG: 20 TABLET ORAL at 06:45

## 2019-04-04 RX ADMIN — DICYCLOMINE HYDROCHLORIDE 20 MG: 20 TABLET ORAL at 00:20

## 2019-04-04 RX ADMIN — MULTIVITAMIN 15 ML: LIQUID ORAL at 10:50

## 2019-04-04 ASSESSMENT — PAIN DESCRIPTION - ORIENTATION: ORIENTATION: RIGHT;MID

## 2019-04-04 ASSESSMENT — PAIN DESCRIPTION - PAIN TYPE: TYPE: ACUTE PAIN

## 2019-04-04 ASSESSMENT — PAIN SCALES - GENERAL
PAINLEVEL_OUTOF10: 7
PAINLEVEL_OUTOF10: 4

## 2019-04-04 ASSESSMENT — PAIN DESCRIPTION - LOCATION: LOCATION: ABDOMEN

## 2019-04-04 ASSESSMENT — PAIN DESCRIPTION - DESCRIPTORS: DESCRIPTORS: ACHING

## 2019-04-04 ASSESSMENT — PAIN DESCRIPTION - FREQUENCY: FREQUENCY: CONTINUOUS

## 2019-04-04 NOTE — PROGRESS NOTES
American >60 >60 mL/min/1.73m2   CBC    Collection Time: 04/03/19  3:34 PM   Result Value Ref Range    WBC 3.5 (L) 4.0 - 10.5 K/CU MM    RBC 4.23 4.2 - 5.4 M/CU MM    Hemoglobin 10.8 (L) 12.5 - 16.0 GM/DL    Hematocrit 39.0 37 - 47 %    MCV 92.2 78 - 100 FL    MCH 25.5 (L) 27 - 31 PG    MCHC 27.7 (L) 32.0 - 36.0 %    RDW 14.0 11.7 - 14.9 %    Platelets 198 239 - 999 K/CU MM    MPV 11.0 7.5 - 11.1 FL       Scheduled Meds:   atenolol  25 mg Oral Daily    calcium-vitamin D  1 tablet Oral BID    cloNIDine  0.1 mg Oral BID    dicyclomine  20 mg Oral 4 times per day    estradiol  0.5 mg Oral Daily    gabapentin  800 mg Oral TID    levothyroxine  125 mcg Oral Daily    CENTRUM/CERTA-LILLY with minerals oral  15 mL Oral Daily    PARoxetine  30 mg Oral Nightly       Continuous Infusions:   pantoprozole (PROTONIX) infusion 8 mg/hr (04/03/19 2036)    sodium chloride 100 mL/hr at 04/04/19 0645       Physical Exam:  HEENT: Anicteric sclerae, Oropharyngeal mucosae moist, pink and intact. Heart:  Normal S1 and S2, RRR  Lungs: Clear to auscultation bilaterally, No audible Wheezes or Rales. Extremities: No edema. Neuro: Alert and Oriented x 3, Non focal.  Abdomen: Soft, Benign, Non tender, Non distended, Positive bowel sounds. Incision: Nicely healing: No erythema, No discharge. Principal Problem:    Pseudoseizure  Active Problems:    Lupus (systemic lupus erythematosus) ([facility] [address]    Morbid obesity with BMI of 50.0-59.9, adult (HCC)    Chronic pain syndrome    Drug-seeking/Aberrant behavior  Resolved Problems:    * No resolved hospital problems.  *      Assessment and Plan:  Nadine Ward is a 46 y.o. female who is status post:  A bariatric sleeve gatrectomy and loosing adequate amount of weight, BUT c/o \"hematemesis\", EGD the day before yesterday revealed a bleeding mucosa at the staple line, injected and well controlled,     May be discharged today IF her next Hb is still stable after stopping the PPI drip and switching to Protonix 40 mg PO BID x 4-6 wks. ___________________________________________    Esha Fong MD, FACS, FICS  4/4/2019  8:00 AM    Patient was seen with total face to face time of 25 minutes. More than 50% of this visit was counseling and education as above in my assessment and plan section of my note.

## 2019-04-04 NOTE — PLAN OF CARE
Problem: Pain:  Goal: Pain level will decrease  Description  Pain level will decrease  Outcome: Ongoing  Goal: Control of acute pain  Description  Control of acute pain  Outcome: Ongoing  Goal: Control of chronic pain  Description  Control of chronic pain  Outcome: Ongoing     Problem: Falls - Risk of:  Goal: Will remain free from falls  Description  Will remain free from falls  Outcome: Ongoing  Goal: Absence of physical injury  Description  Absence of physical injury  Outcome: Ongoing     Problem: Activity:  Goal: Risk for activity intolerance will decrease  Description  Risk for activity intolerance will decrease  Outcome: Ongoing     Problem:  Bowel/Gastric:  Goal: Bowel function will improve  Description  Bowel function will improve  Outcome: Ongoing  Goal: Diagnostic test results will improve  Description  Diagnostic test results will improve  Outcome: Ongoing  Goal: Occurrences of nausea will decrease  Description  Occurrences of nausea will decrease  Outcome: Ongoing  Goal: Occurrences of vomiting will decrease  Description  Occurrences of vomiting will decrease  Outcome: Ongoing     Problem: Fluid Volume:  Goal: Maintenance of adequate hydration will improve  Description  Maintenance of adequate hydration will improve  Outcome: Ongoing     Problem: Health Behavior:  Goal: Ability to state signs and symptoms to report to health care provider will improve  Description  Ability to state signs and symptoms to report to health care provider will improve  Outcome: Ongoing     Problem: Physical Regulation:  Goal: Complications related to the disease process, condition or treatment will be avoided or minimized  Description  Complications related to the disease process, condition or treatment will be avoided or minimized  Outcome: Ongoing  Goal: Ability to maintain clinical measurements within normal limits will improve  Description  Ability to maintain clinical measurements within normal limits will improve  Outcome: Ongoing     Problem: Sensory:  Goal: Pain level will decrease  Description  Pain level will decrease  Outcome: Ongoing  Goal: Ability to identify factors that increase the pain will improve  Description  Ability to identify factors that increase the pain will improve  Outcome: Ongoing  Goal: Ability to notify healthcare provider of pain before it becomes unmanageable or unbearable will improve  Description  Ability to notify healthcare provider of pain before it becomes unmanageable or unbearable will improve  Outcome: Ongoing     Problem: Nutrition  Goal: Optimal nutrition therapy  Outcome: Ongoing

## 2019-04-04 NOTE — PROGRESS NOTES
Notified [address] Cheng pt is being d/c'ed & needs to resume [facility] services. Faxed order, AVS & discharge rupal.

## 2019-04-04 NOTE — DISCHARGE SUMMARY
Discharge Summary    Patient ID   Tariq Tran   1970  [id number]    Primary Care: No primary care provider on file.     Admit date: 3/31/2019   Discharge date: 4/4/2019    Medical Record number: [id number]   Admitting Physician: [facility], MD   Discharge Physician: Taisha Aparicio MD    Consultants: general surgery    Procedures: none    Discharge Diagnoses:      Patient Active Problem List   Diagnosis Code    Abdominal pain R10.9    Rectal bleeding K62.5    Fever R50.9    Hematemesis K92.0    Fibromyalgia M79.7    Lupus (systemic lupus erythematosus) ([facility] [address] M32.9    Nausea & vomiting R11.2    Chest pain R07.9    Chronic pancreatitis ([facility] [address] K86.1    HTN (hypertension) I10    Acute pancreatitis K85.90    Right-sided chest pain R07.9    Super obesity E66.9    Normocytic anemia D64.9    Hypokalemia E87.6    Generalized abdominal pain R10.84    Pneumonia of both lungs due to infectious organism J18.9    Foot ulcer, left ([facility] [address] L97.529    SLE (systemic lupus erythematosus related syndrome) ([facility] [address] M32.9    Hypoxia R09.02    Cellulitis L03.90    Pancytopenia (HCC) D61.818    Pleurisy R09.1    Frequent UTI N39.0    Gastroesophageal reflux disease without esophagitis K21.9    MRSA cellulitis of left foot L03.116, B95.62    Depression F32.9    Fatty liver K76.0    Fatty liver disease, nonalcoholic R27.2    Arthritis M19.90    Bilateral low back pain with left-sided sciatica M54.42    Morbid obesity with BMI of 50.0-59.9, adult (HCC) E66.01, Z68.43    Intractable vomiting with nausea R11.2    Class 3 obesity in adult [id number]    Hiatal hernia K44.9    Poor intravenous access Z78.9    Post-operative nausea and vomiting R11.2, Z98.890    Status post bariatric surgery Z98.84    Status post laparoscopic sleeve gastrectomy Z98.84    Abscess L02.91    Dysphagia R13.10    Pseudoseizure F44.5    Chronic pain syndrome G89.4    Drug-seeking/Aberrant behavior Z76.5 Gastroenteritis  Continue IV fluid resuscitation,  IV Protonix  IV Zofran and Phenergan as needed  Patient may benefit from an endoscopy if hematemesis persists. H&H monitoring  Gen. surgery consult   Morphine as needed  De-escalates pain medication if not more evidence of hematemesis     Peripheral neuropathy  Continue on Neurontin     Hypothyroidism  Continue levothyroxine     Anxiety and depression  Continue on anxiolytics as well as antidepressants       Hospital Course:  Ms Little Has is 46 YF daily emergency Department due to nausea vomiting and diarrhea. She also complained of right-sided/epigastric abdominal pain. Patient has a past medical history of a gastric sleeve with hernia repair approximately 3 weeks prior to admission to the emergency department. She was admitted to hospitalist service with surgery in consultation for evaluation and treatment. Patient improved significantly with medical management at this time she has been cleared by surgery to be discharged to home. She is to follow-up with her own primary care provider in the outpatient setting. Patient is to continue with Protonix 40 mg 1 by mouth twice a day ×6 weeks. Physical Exam:   /83   Pulse 73   Temp 97.7 °F (36.5 °C) (Oral)   Resp 15   Ht 5' 4\" (1.626 m)   Wt 297 lb 6.4 oz (134.9 kg)   SpO2 98%   BMI 51.05 kg/m²   Neck: no JVD  Lungs: equal BS, clear   CV: RRR, normal S1S2, no significant murmur  Abdomen: soft, nontender, normally active BS, no masses or tenderness  Extremities: no edema or cords  Neurologic: alert, oriented, no focal CN or motor deficit        Medications: see computerized discharge medication list     Medication List      CHANGE how you take these medications    * HYDROcodone-acetaminophen 7.5-325 MG per tablet  Commonly known as:  NORCO  Take 1 tablet by mouth every 6 hours as needed for Pain for up to 3 days. What changed:   You were already taking a medication with the same name, and this prescription was added. Make sure you understand how and when to take each. * HYDROcodone-acetaminophen 7.5-325 MG per tablet  Commonly known as:  Ene Bauman  What changed:  Another medication with the same name was added. Make sure you understand how and when to take each. levothyroxine 125 MCG tablet  Commonly known as:  SYNTHROID  Take 1 tablet by mouth Daily  What changed:  when to take this     * pantoprazole 40 MG tablet  Commonly known as:  PROTONIX  Take 1 tablet by mouth 2 times daily  What changed:  Another medication with the same name was added. Make sure you understand how and when to take each. * pantoprazole 20 MG tablet  Commonly known as:  PROTONIX  Take 2 tablets by mouth 2 times daily  What changed: You were already taking a medication with the same name, and this prescription was added. Make sure you understand how and when to take each. * This list has 4 medication(s) that are the same as other medications prescribed for you. Read the directions carefully, and ask your doctor or other care provider to review them with you. CONTINUE taking these medications    atenolol 25 MG tablet  Commonly known as:  TENORMIN     BENTYL 20 MG tablet  Generic drug:  dicyclomine     CALCIUM + VIT D (BARIATRIC ADVANTAGE) CHEWABLE TABLET  Take 1 tablet by mouth 2 times daily     cloNIDine 0.1 MG tablet  Commonly known as:  CATAPRES     ENSURE HIGH PROTEIN Liqd  Take 1 Can by mouth 4 times daily 2 week pre op diet. No other foods.      estradiol 0.5 MG tablet  Commonly known as:  ESTRACE     MVI (BARIATRIC ADVANTAGE MULTI-FORMULA) CHEW TAB  Take 1 tablet by mouth daily     NEURONTIN 800 MG tablet  Generic drug:  gabapentin     ondansetron 4 MG tablet  Commonly known as:  ZOFRAN     PARoxetine 30 MG tablet  Commonly known as:  PAXIL     promethazine 25 MG tablet  Commonly known as:  PHENERGAN        STOP taking these medications    albuterol sulfate  (90 Base) MCG/ACT

## 2019-04-05 ENCOUNTER — TELEPHONE (OUTPATIENT)
Dept: SURGERY | Age: 49
End: 2019-04-05

## 2019-04-05 NOTE — TELEPHONE ENCOUNTER
Will called to see if Dr. Brigida Bonner wanted [place] on IV Fluids since she was just released from the hospital. Dr. Brigida Bonner said no. He will call if he feels she is getting dehydrated so we can re-evaluate.

## 2019-04-12 ENCOUNTER — HOSPITAL ENCOUNTER (OUTPATIENT)
Age: 49
Setting detail: SPECIMEN
Discharge: HOME | End: 2019-04-12
Payer: COMMERCIAL

## 2019-04-12 LAB
FERRITIN: 33 NG/ML (ref 15–150)
IRON: 48 UG/DL (ref 37–145)
PCT TRANSFERRIN: 13 % (ref 10–44)
TOTAL IRON BINDING CAPACITY: 366 UG/DL (ref 250–450)
UNSATURATED IRON BINDING CAPACITY: 318 UG/DL (ref 110–370)

## 2019-04-12 PROCEDURE — 82728 ASSAY OF FERRITIN: CPT

## 2019-04-12 PROCEDURE — 83540 ASSAY OF IRON: CPT

## 2019-04-12 PROCEDURE — 83550 IRON BINDING TEST: CPT

## 2019-04-17 ENCOUNTER — OFFICE VISIT (OUTPATIENT)
Dept: BARIATRICS/WEIGHT MGMT | Age: 49
End: 2019-04-17

## 2019-04-17 VITALS
HEART RATE: 65 BPM | SYSTOLIC BLOOD PRESSURE: 132 MMHG | DIASTOLIC BLOOD PRESSURE: 70 MMHG | WEIGHT: 284.9 LBS | HEIGHT: 64 IN | BODY MASS INDEX: 48.64 KG/M2 | RESPIRATION RATE: 20 BRPM

## 2019-04-17 DIAGNOSIS — Z98.84 STATUS POST LAPAROSCOPIC SLEEVE GASTRECTOMY: Primary | ICD-10-CM

## 2019-04-17 DIAGNOSIS — Z98.84 STATUS POST BARIATRIC SURGERY: ICD-10-CM

## 2019-04-17 DIAGNOSIS — R94.6 ABNORMAL RESULTS OF THYROID FUNCTION STUDIES: ICD-10-CM

## 2019-04-17 PROCEDURE — 99024 POSTOP FOLLOW-UP VISIT: CPT | Performed by: SURGERY

## 2019-04-17 RX ORDER — PROMETHAZINE HYDROCHLORIDE 25 MG/1
25 TABLET ORAL EVERY 6 HOURS PRN
Qty: 35 TABLET | Refills: 3 | Status: ON HOLD | OUTPATIENT
Start: 2019-04-17 | End: 2019-07-23 | Stop reason: SDUPTHER

## 2019-04-17 NOTE — PROGRESS NOTES
BARIATRIC SURGERY POST OPERATIVE NOTE    SUBJECTIVE:    Patient presenting today referred from No primary care provider on file. , for   Chief Complaint   Patient presents with    Post-Op Check     3rd P/O Sleeve/38fb/HHR     /70 (Site: Left Upper Arm, Position: Sitting, Cuff Size: Large Adult)   Pulse 65   Resp 20   Ht 5' 4.25\" (1.632 m)   Wt 284 lb 14.4 oz (129.2 kg)   BMI 48.52 kg/m²      HPI: Watt Habermann is a 46 y.o. female Patient is here for their third, follow up 2 months post op 2/12/19.     Date of Surgery: 2/12/19     Type of Surgery:   Laparoscopic robotic DaVinci-assisted sleeve gastrectomy around a  38-[place] bougie + Hiatal hernia primary repair + extensive lysis of her severe extensive adhesions of her omentum with the anterior abdominal wall and the left lobe of the liver to her stomach           BMI:  Obesity Classification: Class III  Today's weight is 284.9 lbs, last visits weight was 294.3      Wt Readings from Last 3 Encounters:   04/17/19 284 lb 14.4 oz (129.2 kg)   04/04/19 297 lb 6.4 oz (134.9 kg)   03/20/19 290 lb (131.5 kg)      Total gain/loss is -9.4 lbs     Weight History: Wt Readings from Last 3 Encounters:   04/17/19 284 lb 14.4 oz (129.2 kg)   04/04/19 297 lb 6.4 oz (134.9 kg)   03/20/19 290 lb (131.5 kg)         Total weight loss/gain -9.4 Lbs over 1 month.             How pleased are you with your current weight loss: okay with it  If you are disappointed, what do you think is preventing you from increased weight loss? Is patient experiencing any physical problems post surgery: No:   Is patient experiencing any dietary problems post surgery: No:   Food Intolerances: Is not tolerating  pasta and breads and red meat. How hungry are you? Don't ever really feel hungry  How full do you feel after eating?  really full  How fast do you eat? 1 hour  Food log brought to appointment today: No     Breakfast: yes  Mid-AM Snack: yes  Lunch: yes  Mid-PM Snack: yes  Dinner: yes  Evening Snack: yes     Liquids Consumed: 67 oz per day. Times of day liquids consumed: throughout  Patient taking protein supplement: Yes. Brand of Supplement: it varies  Patient taking multivitamins: Yes  Does patient exercise: every other day  What prevents you from exercising: nothing      Current Outpatient Medications:     pantoprazole (PROTONIX) 20 MG tablet, Take 2 tablets by mouth 2 times daily, Disp: 60 tablet, Rfl: 0    HYDROcodone-acetaminophen (NORCO) 7.5-325 MG per tablet, Take 1 tablet by mouth every 8 hours as needed for Pain., Disp: , Rfl:     promethazine (PHENERGAN) 25 MG tablet, Take 25 mg by mouth every 6 hours as needed for Nausea, Disp: , Rfl:     ondansetron (ZOFRAN) 4 MG tablet, Take 4 mg by mouth every 8 hours as needed for Nausea or Vomiting, Disp: , Rfl:     estradiol (ESTRACE) 0.5 MG tablet, Take 0.5 mg by mouth daily, Disp: , Rfl:     dicyclomine (BENTYL) 20 MG tablet, Take 20 mg by mouth every 6 hours, Disp: , Rfl:     atenolol (TENORMIN) 25 MG tablet, Take 25 mg by mouth daily, Disp: , Rfl:     cloNIDine (CATAPRES) 0.1 MG tablet, Take 0.1 mg by mouth 2 times daily, Disp: , Rfl:     Multiple Vitamin (MVI, BARIATRIC ADVANTAGE MULTI-FORMULA, CHEW TAB), Take 1 tablet by mouth daily, Disp: 30 tablet, Rfl: 5    Calcium Citrate-Vitamin D (CALCIUM + VIT D, BARIATRIC ADVANTAGE, CHEWABLE TABLET), Take 1 tablet by mouth 2 times daily, Disp: 120 tablet, Rfl: 5    pantoprazole (PROTONIX) 40 MG tablet, Take 1 tablet by mouth 2 times daily, Disp: 60 tablet, Rfl: 3    Nutritional Supplements (ENSURE HIGH PROTEIN) LIQD, Take 1 Can by mouth 4 times daily 2 week pre op diet. No other foods. , Disp: 120 Can, Rfl: 0    levothyroxine (SYNTHROID) 125 MCG tablet, Take 1 tablet by mouth Daily (Patient taking differently: Take 125 mcg by mouth nightly ), Disp: 30 tablet, Rfl: 0    gabapentin (NEURONTIN) 800 MG tablet, Take 800 mg by mouth 3 times daily, Disp: , Rfl:    PARoxetine (PAXIL) 30 MG tablet, Take 30 mg by mouth nightly , Disp: , Rfl:   Past Medical History:   Diagnosis Date    Acid reflux     Anemia     Anxiety     Arthritis     Hands, Back And Ankles    Bleeding ulcer     \"I Had Ulcers In My Stomach And Colon\"    Bronchitis Last Episode     Chronic back pain     Chronic pain     Sees Dr. Fausto Cox COPD (chronic obstructive pulmonary disease) ([facility] [address]     Sees Dr. Chery Bragg Depression     Fibromyalgia Dx     H/O echocardiogram 8/11/15    EF >55%. LA to be at the upper limit of normal in size. LV hypertrophy with normal LV systolic, but abnormal diastolic function. Normal valvular structures and function.  H/O echocardiogram 2018    EF 55-60%    Hiatal hernia     History of blood transfusion 2015 And     No Reaction To Blood Transfusions Received    [place] (hard of hearing)     Right Ear    Hx of cardiac catheterization 10/24/2010    Angiographically normal coronary arteries w/ normal LV function and wall motion.  Hx of transesophageal echocardiography (PILO) for monitoring 2010    EF 55-60%. WNL.     Hypertension     Lupus Dx     \"Rheumatoid Lupus\"    Morbid obesity ([facility] [address]     Nausea     \"Most Of The Time\"    Pain management     Sees Dr. Alexa Alvares, Once A Month    Pancreatitis chronic Dx     Pneumonia Dx 11-15    Shortness of breath on exertion     Shortness of breath on exertion     Sleep apnea     Has CPAP    Staph infection Dx 11-15    Left Foot    Staph infection Dx 11-15    \"Left Foot\"    Teeth missing     Upper And Lower    Thyroid disease     UTI (urinary tract infection) In Past    No Current Symptoms    Wears glasses     To Read      Past Surgical History:   Procedure Laterality Date    APPENDECTOMY  1998    Done When Tubes And Ovaries Were Removed    CARDIAC CATHETERIZATION  10/24/2010     SECTION  1991    CHOLECYSTECTOMY, LAPAROSCOPIC      COLONOSCOPY  Last Done In 2000's   [facility] D[facility] SUR[facility]ERY      Teeth Extracted In Past    ENDOSCOPY, COLON, DIAGNOSTIC  Last Done In 2018    FOOT SURGERY Left Last Done In 2016     Dr. Randy Huang, \" About 6 Surgeries Done Because Of Staph Infection\"    HIATAL HERNIA REPAIR N/A 2/12/2019    HERNIA HIATAL LAPAROSCOPIC ROBOTIC performed by Remington Reyes MD at [address]  10/1997    Partial Abdominal Hysterectomy    LAP BAND  ~2000    removed after 6 months    LUNG REMOVAL, PARTIAL Left 2008    Benign    OTHER SURGICAL HISTORY  02/1998    \"Tubes And Ovaries Removed, Appendectomy Also Done\"    OTHER SURGICAL HISTORY  Last Done 7-15-16    Mediport Insertion \"Total Of Six Done, Removed Last Mediport In 2014\"    SLEEVE GASTRECTOMY N/A 2/12/2019    GASTRECTOMY SLEEVE LAPAROSCOPIC ROBOTIC performed by Remington Reyes MD at [address] [facility]  08/27/2018    UPPER GASTROINTESTINAL ENDOSCOPY N/A 4/2/2019    EGD CONTROL HEMORRHAGE with epi injection at bleeding site performed by Remington Reyes MD at [address] History     Socioeconomic History    Marital status:      Spouse name: None    Number of children: None    Years of education: None    Highest education level: None   Occupational History    None   Social Needs    Financial resource strain: None    Food insecurity:     Worry: None     Inability: None    Transportation needs:     Medical: None     Non-medical: None   Tobacco Use    Smoking status: Never Smoker    Smokeless tobacco: Never Used   Substance and Sexual Activity    Alcohol use: No     Alcohol/week: 0.0 oz    Drug use: No    Sexual activity: Never   Lifestyle    Physical activity:     Days per week: None     Minutes per session: None    Stress: None   Relationships    Social connections:     Talks on phone: None     Gets together: None     Attends Synagogue service: None     Active member of club or organization: None     Attends meetings of clubs or organizations: None     Relationship status: None    Intimate partner violence:     Fear of current or ex partner: None     Emotionally abused: None     Physically abused: None     Forced sexual activity: None   Other Topics Concern    None   Social History Narrative    None     Review of Systems      OBJECTIVE:    Physical Exam    Assessment / Plan:    1. Status post laparoscopic sleeve gastrectomy    2. Status post bariatric surgery    3. Abnormal results of thyroid function studies           Doing MUCH better, needs thyroid function checked, will do, and F/U    Orders Placed This Encounter   Procedures    TSH with Reflex     Standing Status:   Future     Standing Expiration Date:   4/17/2020         Follow Up:  Return in about 1 month (around 5/15/2019) for For imaging and tests results review, Bariatric follow up: diet, exercise & weight loss.     Amado Quiñones MD, FACS, FICS  Member of the [employer]    [phone]    4/17/19

## 2019-04-29 ENCOUNTER — HOSPITAL ENCOUNTER (EMERGENCY)
Age: 49
Discharge: HOME | End: 2019-04-29
Attending: EMERGENCY MEDICINE
Payer: COMMERCIAL

## 2019-04-29 VITALS
SYSTOLIC BLOOD PRESSURE: 109 MMHG | BODY MASS INDEX: 46.26 KG/M2 | OXYGEN SATURATION: 100 % | RESPIRATION RATE: 18 BRPM | TEMPERATURE: 98.4 F | HEART RATE: 68 BPM | DIASTOLIC BLOOD PRESSURE: 55 MMHG | WEIGHT: 271 LBS | HEIGHT: 64 IN

## 2019-04-29 DIAGNOSIS — R19.7 NAUSEA VOMITING AND DIARRHEA: Primary | ICD-10-CM

## 2019-04-29 DIAGNOSIS — R11.2 NAUSEA VOMITING AND DIARRHEA: Primary | ICD-10-CM

## 2019-04-29 DIAGNOSIS — R10.13 ABDOMINAL PAIN, EPIGASTRIC: ICD-10-CM

## 2019-04-29 DIAGNOSIS — L03.116 CELLULITIS OF LEFT LOWER EXTREMITY: ICD-10-CM

## 2019-04-29 LAB
ALBUMIN SERPL-MCNC: 4 GM/DL (ref 3.4–5)
ALP BLD-CCNC: 129 IU/L (ref 40–129)
ALT SERPL-CCNC: 29 U/L (ref 10–40)
ANION GAP SERPL CALCULATED.3IONS-SCNC: 9 MMOL/L (ref 4–16)
AST SERPL-CCNC: 25 IU/L (ref 15–37)
BACTERIA: NEGATIVE /HPF
BASOPHILS ABSOLUTE: 0 K/CU MM
BASOPHILS RELATIVE PERCENT: 0.5 % (ref 0–1)
BILIRUB SERPL-MCNC: 0.2 MG/DL (ref 0–1)
BILIRUBIN URINE: NEGATIVE MG/DL
BLOOD, URINE: NEGATIVE
BUN BLDV-MCNC: 13 MG/DL (ref 6–23)
CALCIUM SERPL-MCNC: 10.3 MG/DL (ref 8.3–10.6)
CHLORIDE BLD-SCNC: 106 MMOL/L (ref 99–110)
CLARITY: CLEAR
CO2: 27 MMOL/L (ref 21–32)
COLOR: YELLOW
CREAT SERPL-MCNC: 0.7 MG/DL (ref 0.6–1.1)
DIFFERENTIAL TYPE: ABNORMAL
EOSINOPHILS ABSOLUTE: 0.3 K/CU MM
EOSINOPHILS RELATIVE PERCENT: 6.2 % (ref 0–3)
GFR AFRICAN AMERICAN: >60 ML/MIN/1.73M2
GFR NON-AFRICAN AMERICAN: >60 ML/MIN/1.73M2
GLUCOSE BLD-MCNC: 113 MG/DL (ref 70–99)
GLUCOSE, URINE: NEGATIVE MG/DL
HCT VFR BLD CALC: 33 % (ref 37–47)
HEMOGLOBIN: 9.9 GM/DL (ref 12.5–16)
HYALINE CASTS: 0 /LPF
IMMATURE NEUTROPHIL %: 0 % (ref 0–0.43)
KETONES, URINE: NEGATIVE MG/DL
LEUKOCYTE ESTERASE, URINE: NEGATIVE
LIPASE: 14 IU/L (ref 13–60)
LYMPHOCYTES ABSOLUTE: 1.9 K/CU MM
LYMPHOCYTES RELATIVE PERCENT: 46.4 % (ref 24–44)
MCH RBC QN AUTO: 24.8 PG (ref 27–31)
MCHC RBC AUTO-ENTMCNC: 30 % (ref 32–36)
MCV RBC AUTO: 82.5 FL (ref 78–100)
MONOCYTES ABSOLUTE: 0.3 K/CU MM
MONOCYTES RELATIVE PERCENT: 7.4 % (ref 0–4)
MUCUS: ABNORMAL HPF
NITRITE URINE, QUANTITATIVE: NEGATIVE
NUCLEATED RBC %: 0 %
PDW BLD-RTO: 13.8 % (ref 11.7–14.9)
PH, URINE: 6 (ref 5–8)
PLATELET # BLD: 213 K/CU MM (ref 140–440)
PMV BLD AUTO: 10.5 FL (ref 7.5–11.1)
POTASSIUM SERPL-SCNC: 4 MMOL/L (ref 3.5–5.1)
PROTEIN UA: NEGATIVE MG/DL
RBC # BLD: 4 M/CU MM (ref 4.2–5.4)
RBC URINE: <1 /HPF (ref 0–6)
SEGMENTED NEUTROPHILS ABSOLUTE COUNT: 1.7 K/CU MM
SEGMENTED NEUTROPHILS RELATIVE PERCENT: 39.5 % (ref 36–66)
SODIUM BLD-SCNC: 142 MMOL/L (ref 135–145)
SPECIFIC GRAVITY UA: 1.02 (ref 1–1.03)
SQUAMOUS EPITHELIAL: 5 /HPF
TOTAL IMMATURE NEUTOROPHIL: 0 K/CU MM
TOTAL NUCLEATED RBC: 0 K/CU MM
TOTAL PROTEIN: 6.8 GM/DL (ref 6.4–8.2)
TRICHOMONAS: ABNORMAL /HPF
UROBILINOGEN, URINE: NORMAL MG/DL (ref 0.2–1)
WBC # BLD: 4.2 K/CU MM (ref 4–10.5)
WBC UA: 1 /HPF (ref 0–5)

## 2019-04-29 PROCEDURE — 96361 HYDRATE IV INFUSION ADD-ON: CPT

## 2019-04-29 PROCEDURE — 96372 THER/PROPH/DIAG INJ SC/IM: CPT

## 2019-04-29 PROCEDURE — 96374 THER/PROPH/DIAG INJ IV PUSH: CPT

## 2019-04-29 PROCEDURE — 99284 EMERGENCY DEPT VISIT MOD MDM: CPT

## 2019-04-29 PROCEDURE — 96375 TX/PRO/DX INJ NEW DRUG ADDON: CPT

## 2019-04-29 PROCEDURE — 81001 URINALYSIS AUTO W/SCOPE: CPT

## 2019-04-29 PROCEDURE — 80053 COMPREHEN METABOLIC PANEL: CPT

## 2019-04-29 PROCEDURE — 2580000003 HC RX 258: Performed by: EMERGENCY MEDICINE

## 2019-04-29 PROCEDURE — 6370000000 HC RX 637 (ALT 250 FOR IP): Performed by: EMERGENCY MEDICINE

## 2019-04-29 PROCEDURE — 6360000002 HC RX W HCPCS: Performed by: EMERGENCY MEDICINE

## 2019-04-29 PROCEDURE — 83690 ASSAY OF LIPASE: CPT

## 2019-04-29 PROCEDURE — 85025 COMPLETE CBC W/AUTO DIFF WBC: CPT

## 2019-04-29 RX ORDER — ONDANSETRON 2 MG/ML
4 INJECTION INTRAMUSCULAR; INTRAVENOUS EVERY 30 MIN PRN
Status: DISCONTINUED | OUTPATIENT
Start: 2019-04-29 | End: 2019-04-29 | Stop reason: HOSPADM

## 2019-04-29 RX ORDER — HYDROCODONE BITARTRATE AND ACETAMINOPHEN 5; 325 MG/1; MG/1
2 TABLET ORAL ONCE
Status: COMPLETED | OUTPATIENT
Start: 2019-04-29 | End: 2019-04-29

## 2019-04-29 RX ORDER — PROMETHAZINE HYDROCHLORIDE 25 MG/ML
12.5 INJECTION, SOLUTION INTRAMUSCULAR; INTRAVENOUS ONCE
Status: COMPLETED | OUTPATIENT
Start: 2019-04-29 | End: 2019-04-29

## 2019-04-29 RX ORDER — HEPARIN SODIUM (PORCINE) LOCK FLUSH IV SOLN 100 UNIT/ML 100 UNIT/ML
100 SOLUTION INTRAVENOUS ONCE
Status: COMPLETED | OUTPATIENT
Start: 2019-04-29 | End: 2019-04-29

## 2019-04-29 RX ORDER — 0.9 % SODIUM CHLORIDE 0.9 %
1000 INTRAVENOUS SOLUTION INTRAVENOUS ONCE
Status: COMPLETED | OUTPATIENT
Start: 2019-04-29 | End: 2019-04-29

## 2019-04-29 RX ORDER — MORPHINE SULFATE 4 MG/ML
2 INJECTION, SOLUTION INTRAMUSCULAR; INTRAVENOUS ONCE
Status: COMPLETED | OUTPATIENT
Start: 2019-04-29 | End: 2019-04-29

## 2019-04-29 RX ORDER — DOXYCYCLINE HYCLATE 100 MG
100 TABLET ORAL 2 TIMES DAILY
Qty: 20 TABLET | Refills: 0 | Status: SHIPPED | OUTPATIENT
Start: 2019-04-29 | End: 2019-05-09

## 2019-04-29 RX ORDER — PROMETHAZINE HYDROCHLORIDE 25 MG/1
25 SUPPOSITORY RECTAL EVERY 6 HOURS PRN
Qty: 10 SUPPOSITORY | Refills: 0 | Status: SHIPPED | OUTPATIENT
Start: 2019-04-29 | End: 2019-05-06

## 2019-04-29 RX ADMIN — HYDROCODONE BITARTRATE AND ACETAMINOPHEN 2 TABLET: 5; 325 TABLET ORAL at 11:00

## 2019-04-29 RX ADMIN — MORPHINE SULFATE 2 MG: 4 INJECTION INTRAVENOUS at 09:56

## 2019-04-29 RX ADMIN — SODIUM CHLORIDE, PRESERVATIVE FREE 100 UNITS: 5 INJECTION INTRAVENOUS at 12:35

## 2019-04-29 RX ADMIN — ONDANSETRON 4 MG: 2 INJECTION INTRAMUSCULAR; INTRAVENOUS at 09:56

## 2019-04-29 RX ADMIN — SODIUM CHLORIDE 1000 ML: 9 INJECTION, SOLUTION INTRAVENOUS at 09:56

## 2019-04-29 RX ADMIN — PROMETHAZINE HYDROCHLORIDE 12.5 MG: 25 INJECTION INTRAMUSCULAR; INTRAVENOUS at 12:35

## 2019-04-29 ASSESSMENT — PAIN DESCRIPTION - ORIENTATION: ORIENTATION: RIGHT

## 2019-04-29 ASSESSMENT — ENCOUNTER SYMPTOMS
BACK PAIN: 0
SHORTNESS OF BREATH: 0
SORE THROAT: 0
COUGH: 0
RHINORRHEA: 0
NAUSEA: 1
BLOOD IN STOOL: 0
VOMITING: 1
DIARRHEA: 1
ABDOMINAL PAIN: 0
EYE REDNESS: 0
CONSTIPATION: 0

## 2019-04-29 ASSESSMENT — PAIN DESCRIPTION - PAIN TYPE: TYPE: ACUTE PAIN

## 2019-04-29 ASSESSMENT — PAIN DESCRIPTION - LOCATION: LOCATION: ABDOMEN;FLANK

## 2019-04-29 ASSESSMENT — PAIN SCALES - GENERAL
PAINLEVEL_OUTOF10: 6
PAINLEVEL_OUTOF10: 6
PAINLEVEL_OUTOF10: 8

## 2019-04-29 NOTE — ED PROVIDER NOTES
Triage Chief Complaint:   Emesis (since last night) and Abdominal Pain    [place]:  Charu Ortiz is a 46 y.o. female status post gastric sleeve procedure on February 12 and has had issues with hydration and nausea since that time presents with increased nausea and nonbloody, nonbilious emesis for the last 2 days. She also complains of pain in the epigastric region of her stomach that radiates to the right upper quadrant and right flank. The pain is constant but does wax and wane. She has had chills but no fevers. She also has associated diarrhea that is yellow. She has had diarrhea since surgery but it is increased in frequency. She denies any dysuria or increased urinary frequency. She also notes an erythematous fluctuant spot on the medial side of her left foot as well as redness to her left large toe. She noticed the redness on her large toe 3-4 days ago and then noticed the spot slightly more proximally. The spot and the toe are tender to palpation. She is concerned that she has previously had a significant MRSA infection in her left foot. No fevers. States feels similar to previous chronic pancreatitis. ROS:   Review of Systems   Constitutional: Positive for chills. Negative for fever. HENT: Negative for congestion, rhinorrhea and sore throat. Eyes: Negative for redness and visual disturbance. Respiratory: Negative for cough and shortness of breath. Cardiovascular: Negative for chest pain and leg swelling. Gastrointestinal: Positive for diarrhea, nausea and vomiting. Negative for abdominal pain, blood in stool and constipation. Genitourinary: Negative for dysuria and frequency. Musculoskeletal: Negative for arthralgias and back pain. Skin: Positive for rash (redness over L large toe as in HPI). Negative for wound. Neurological: Negative for syncope and headaches. Psychiatric/Behavioral: Negative. Negative for hallucinations and suicidal ideas.        Past Medical History: Diagnosis Date    Acid reflux     Anemia     Anxiety     Arthritis     Hands, Back And Ankles    Bleeding ulcer     \"I Had Ulcers In My Stomach And Colon\"    Bronchitis Last Episode 2014    Chronic back pain     [facility]     [facility]    COPD (chronic obstructive pulmonary disease) ([facility] [address]     Sees Dr. Chiu Pancoa Depression     Fibromyalgia Dx     H/O echocardiogram 8/11/15    EF >55%. LA to be at the upper limit of normal in size. LV hypertrophy with normal LV systolic, but abnormal diastolic function. Normal valvular structures and function.  H/O echocardiogram 2018    EF 55-60%    Hiatal hernia     History of blood transfusion 2015 And     No Reaction To Blood Transfusions Received    [place] (hard of hearing)     Right Ear    Hx of cardiac catheterization 10/24/2010    Angiographically normal coronary arteries w/ normal LV function and wall motion.  Hx of transesophageal echocardiography (PILO) for monitoring 2010    EF 55-60%. WNL.     Hypertension     Lupus Dx     \"Rheumatoid Lupus\"    Morbid obesity ([facility] [address]     Nausea     \"Most Of The Time\"    Pain management     Sees Dr. Ursula Wynn, Once A Month    Pancreatitis chronic Dx     Pneumonia Dx 11-15    Shortness of breath on exertion     Shortness of breath on exertion     Sleep apnea     Has CPAP    Staph infection Dx 11-15    Left Foot    Staph infection Dx 11-15    \"Left Foot\"    Teeth missing     Upper And Lower    Thyroid disease     UTI (urinary tract infection) In Past    No Current Symptoms    Wears glasses     To Read     Past Surgical History:   Procedure Laterality Date    APPENDECTOMY  1998    Done When Tubes And Ovaries Were Removed    CARDIAC CATHETERIZATION  10/24/2010     SECTION  1991    CHOLECYSTECTOMY, LAPAROSCOPIC      COLONOSCOPY  Last Done In 's    DENTAL SURGERY      Teeth Extracted In Past    ENDOSCOPY, COLON, DIAGNOSTIC  Last Done In 2018   [address] Left Last Done In 2016     Dr. Eleonora Panda, \" About 6 Surgeries Done Because Of Staph Infection\"    HIATAL HERNIA REPAIR N/A 2/12/2019    HERNIA HIATAL LAPAROSCOPIC ROBOTIC performed by Sushila Richardson MD at [address]  10/1997    Partial Abdominal Hysterectomy    LAP BAND  ~2000    removed after 6 months    LUNG REMOVAL, PARTIAL Left 2008    Benign    OTHER SURGICAL HISTORY  02/1998    \"Tubes And Ovaries Removed, Appendectomy Also Done\"    OTHER SURGICAL HISTORY  Last Done 7-15-16    Mediport Insertion \"Total Of Six Done, Removed Last Mediport In 2014\"    SLEEVE GASTRECTOMY N/A 2/12/2019    GASTRECTOMY SLEEVE LAPAROSCOPIC ROBOTIC [facility]  08/27/2018    UPPER GASTROINTESTINAL ENDOSCOPY N/A 4/2/2019    EGD CONTROL HEMORRHAGE with epi injection at bleeding site performed by Sushila Richardson MD at [facility]     Family History   Problem Relation Age of Onset    Diabetes Mother         \"Borderline Diabetes\"    High Blood Pressure Mother     Obesity Mother     Arthritis Mother     Heart Disease Mother     High Cholesterol Mother     Vision Loss Mother     Diabetes Father     High Blood Pressure Father     Asthma Father     High Blood Pressure Brother     Asthma Son     Vision Loss Son     Lupus Daughter     Other Daughter         \"Alot Of Female Problems\"     Social History     Socioeconomic History    Marital status:      Spouse name: Not on file    Number of children: Not on file    Years of education: Not on file    Highest education level: Not on file   Occupational History    Not on file   Social Needs    Financial resource strain: Not on file    Food insecurity:     Worry: Not on file     Inability: Not on file    Transportation needs:     Medical: Not on file     Non-medical: Not on file   Tobacco Use  Smoking status: Never Smoker    Smokeless tobacco: Never Used   Substance and Sexual Activity    Alcohol use: No     Alcohol/week: 0.0 oz    Drug use: No    Sexual activity: Never   Lifestyle    Physical activity:     Days per week: Not on file     Minutes per session: Not on file    Stress: Not on file   Relationships    Social connections:     Talks on phone: Not on file     Gets together: Not on file     Attends [employer] service: Not on file     Active member of club or organization: Not on file     Attends meetings of clubs or organizations: Not on file     Relationship status: Not on file    Intimate partner violence:     Fear of current or ex partner: Not on file     Emotionally abused: Not on file     Physically abused: Not on file     Forced sexual activity: Not on file   Other Topics Concern    Not on file   Social History Narrative    Not on file     No current facility-administered medications for this encounter.       Current Outpatient Medications   Medication Sig Dispense Refill    doxycycline hyclate (VIBRA-TABS) 100 MG tablet Take 1 tablet by mouth 2 times daily for 10 days 20 tablet 0    promethazine (PHENERGAN) 25 MG tablet Take 1 tablet by mouth every 6 hours as needed for Nausea 35 tablet 3    ondansetron (ZOFRAN) 4 MG tablet Take 4 mg by mouth every 8 hours as needed for Nausea or Vomiting      estradiol (ESTRACE) 0.5 MG tablet Take 0.5 mg by mouth daily      dicyclomine (BENTYL) 20 MG tablet Take 20 mg by mouth every 6 hours      atenolol (TENORMIN) 25 MG tablet Take 25 mg by mouth daily      cloNIDine (CATAPRES) 0.1 MG tablet Take 0.1 mg by mouth 2 times daily      Multiple Vitamin (MVI, BARIATRIC ADVANTAGE MULTI-FORMULA, CHEW TAB) Take 1 tablet by mouth daily 30 tablet 5    Calcium Citrate-Vitamin D (CALCIUM + VIT D, BARIATRIC ADVANTAGE, CHEWABLE TABLET) Take 1 tablet by mouth 2 times daily 120 tablet 5    pantoprazole (PROTONIX) 40 MG tablet Take 1 tablet by mouth 2 times daily 60 tablet 3    Nutritional Supplements (ENSURE HIGH PROTEIN) LIQD Take 1 Can by mouth 4 times daily 2 week pre op diet. No other foods. 120 Can 0    levothyroxine (SYNTHROID) 125 MCG tablet Take 1 tablet by mouth Daily (Patient taking differently: Take 125 mcg by mouth nightly ) 30 tablet 0    gabapentin (NEURONTIN) 800 MG tablet Take 800 mg by mouth 3 times daily      PARoxetine (PAXIL) 30 MG tablet Take 30 mg by mouth nightly       pantoprazole (PROTONIX) 20 MG tablet Take 2 tablets by mouth 2 times daily 60 tablet 0    HYDROcodone-acetaminophen (NORCO) 7.5-325 MG per tablet Take 1 tablet by mouth every 8 hours as needed for Pain. Allergies   Allergen Reactions    Aspirin Palpitations     \"My Heart Rate Elevates\"    Compazine [Prochlorperazine Maleate] Rash    Reglan [Metoclopramide Hcl] Rash    Shellfish-Derived Products Swelling    Toradol [Ketorolac Tromethamine] Rash       Nursing Notes Reviewed     Physical Exam:   ED Triage Vitals   Enc Vitals Group      BP 04/29/19 0812 (!) 108/50      Pulse 04/29/19 0811 68      Resp 04/29/19 0811 18      Temp 04/29/19 0811 98.4 °F (36.9 °C)      Temp Source 04/29/19 0811 Oral      SpO2 04/29/19 0811 96 %      Weight 04/29/19 0806 271 lb (122.9 kg)      Height 04/29/19 0806 5' 4\" (1.626 m)      Head Circumference --       Peak Flow --       Pain Score --       Pain Loc --       Pain Edu? --       Excl. in GC? --      BP (!) 109/55   Pulse 68   Temp 98.4 °F (36.9 °C) (Oral)   Resp 18   Ht 5' 4\" (1.626 m)   Wt 271 lb (122.9 kg)   SpO2 100%   BMI 46.52 kg/m²   My pulse ox interpretation is - normal  Physical Exam   Constitutional: She appears well-developed and well-nourished. Appears uncomfortable     HENT:   Head: Normocephalic and atraumatic. Eyes: Conjunctivae are normal. Right eye exhibits no discharge. Left eye exhibits no discharge. Cardiovascular: Normal rate and regular rhythm.    Pulses:       Dorsalis pedis pulses are 2+ on Alb 4.0 3.4 - 5.0 GM/DL    Total Protein 6.8 6.4 - 8.2 GM/DL    Total Bilirubin 0.2 0.0 - 1.0 MG/DL    ALT 29 10 - 40 U/L    AST 25 15 - 37 IU/L    Alkaline Phosphatase 129 40 - 129 IU/L    GFR Non-African American >60 >60 mL/min/1.73m2    GFR African American >60 >60 mL/min/1.73m2    Anion Gap 9 4 - 16   Lipase   Result Value Ref Range    Lipase 14 13 - 60 IU/L   Urinalysis, reflex to microscopic   Result Value Ref Range    Color, UA YELLOW YELLOW    Clarity, UA CLEAR CLEAR    Glucose, Urine NEGATIVE NEGATIVE MG/DL    Bilirubin Urine NEGATIVE NEGATIVE MG/DL    Ketones, Urine NEGATIVE NEGATIVE MG/DL    Specific Gravity, UA 1.021 1.001 - 1.035    Blood, Urine NEGATIVE NEGATIVE    pH, Urine 6.0 5.0 - 8.0    Protein, UA NEGATIVE NEGATIVE MG/DL    Urobilinogen, Urine NORMAL 0.2 - 1.0 MG/DL    Nitrite Urine, Quantitative NEGATIVE NEGATIVE    Leukocyte Esterase, Urine NEGATIVE NEGATIVE    RBC, UA <1 0 - 6 /HPF    WBC, UA 1 0 - 5 /HPF    Bacteria, UA NEGATIVE NEGATIVE /HPF    Squam Epithel, UA 5 /HPF    Mucus, UA RARE (A) NEGATIVE HPF    Trichomonas, UA NONE SEEN NONE SEEN /HPF    Hyaline Casts, UA 0 /LPF      Radiographs (if obtained):  [] The following radiograph was interpreted by myself in the absence of a radiologist:  [x]Radiologist's Report Reviewed:  No orders to display         EKG (if obtained): (All EKG's are interpreted by myself in the absence of a cardiologist)    MDM:  Differential diagnoses considered include gastritis, gastroenteritis, chronic pancreatitis, colitis, enteritis, complication of gastric sleeve procedure, dehydration, cellulitis, abscess. Basic labs revealed mild anemia which appears to be chronic. Labs are otherwise unremarkable. Lipase is normal, I do not suspect pancreatitis. Urine is not concerning for an infection. She does not appear clinically dehydrated or dehydrated on labs. Patient was given Zofran as well as morphine with moderate improvement or symptoms.   She did have returned of her nausea for which was given phenergan. No further episodes of emesis in the emergency department. I do suspect an emergent cause of her symptoms. I'm not concerned for dehydration. Patient abdominal exam is benign. I encouraged her to take her home pain and nausea medication and follow-up closely with her surgeon. Will discharge her home in stable condition. Patient has a small area of redness on her leg concerning for cellulitis. Given patient's multiple medical problems will empirically treat her with doxycycline. Recommended close follow-up with her primary care physician for wound recheck of the area of redness. I estimate there is low risk for acute appendicitis, bowel obstruction, cholecystitis, ruptured diverticulitis, incarcerated hernia, hemorrhagic pancreatitis, or perforated bowel/ulcer, ectopic pregnancy, ovarian torsion or tubo-ovarian ovarian abscess thus I consider the discharge disposition reasonable. Plan of care explained to patient. Concerning signs and symptoms warranting a return visit to the Emergency Department were explained in detail. All questions and concerns were addressed to the patient's satisfaction. Patient understood and agreed with plan. The likelihood of other entities in the differential is insufficient to justify any further testing for them. This was explained to the patient. The patient was advised that persistent or worsening symptoms would requirefurther evaluation. Clinical Impression:  1. Nausea vomiting and diarrhea    2. Abdominal pain, epigastric    3. Cellulitis of left lower extremity          Kory Huang MD       Please note that portions of this note may have been complete with a voice recognition program.  Effortswere made to edit the dictations, but occasional words are mis-transcribed.           Kory Huang MD  05/07/19 3296

## 2019-04-29 NOTE — ED NOTES
Patient has a port for IV access and blood work     Tom Kauffman.  [facility]  04/29/19 0853

## 2019-04-29 NOTE — ED NOTES
Patient placed call light on. This nurse answered with [address] phone. Patient states that she needs a nurse. This nurse in room, patient is dry heaving and stating \"help me\". Patient noted to be clenching the bed rail and shaking her entire body. During this time the patient is stating \"help me. \" This nurse removed patient's hand from side rail and opened patient's eyelid. Patient's pupils noted to be reactive to light and patient is purposefully moving her eyeball around in different directions. Patient's shaking motion noted to stop, patient asked if she remembered what happened and patient states that she is having pain in her abdomen.      Lillian Mathews RN  04/29/19 9493

## 2019-04-29 NOTE — ED NOTES
Pt vomited immediately after taking pills. Pt states \"please ask Dr for iv pain medications since I can't keep down the pills\" Only clear liquid in emesis bag. Pt shown that pills were not in emesis bag and she had not vomited them. Pt given washcloth and new emesis bag.       Ronnie Fernández RN  04/29/19 7619

## 2019-06-12 ENCOUNTER — HOSPITAL ENCOUNTER (EMERGENCY)
Age: 49
Discharge: HOME | DRG: 501 | End: 2019-06-13
Payer: COMMERCIAL

## 2019-06-12 ENCOUNTER — APPOINTMENT (OUTPATIENT)
Dept: ULTRASOUND IMAGING | Age: 49
DRG: 501 | End: 2019-06-12
Payer: COMMERCIAL

## 2019-06-12 ENCOUNTER — APPOINTMENT (OUTPATIENT)
Dept: GENERAL RADIOLOGY | Age: 49
DRG: 501 | End: 2019-06-12
Payer: COMMERCIAL

## 2019-06-12 DIAGNOSIS — L98.9 FOOT LESION: Primary | ICD-10-CM

## 2019-06-12 LAB
ALBUMIN SERPL-MCNC: 4.4 GM/DL (ref 3.4–5)
ALP BLD-CCNC: 135 IU/L (ref 40–129)
ALT SERPL-CCNC: 23 U/L (ref 10–40)
ANION GAP SERPL CALCULATED.3IONS-SCNC: 9 MMOL/L (ref 4–16)
AST SERPL-CCNC: 23 IU/L (ref 15–37)
BACTERIA: ABNORMAL /HPF
BASOPHILS ABSOLUTE: 0 K/CU MM
BASOPHILS RELATIVE PERCENT: 0.4 % (ref 0–1)
BILIRUB SERPL-MCNC: 0.3 MG/DL (ref 0–1)
BILIRUBIN URINE: NEGATIVE MG/DL
BLOOD, URINE: NEGATIVE
BUN BLDV-MCNC: 9 MG/DL (ref 6–23)
CALCIUM SERPL-MCNC: 10.8 MG/DL (ref 8.3–10.6)
CHLORIDE BLD-SCNC: 104 MMOL/L (ref 99–110)
CLARITY: CLEAR
CO2: 25 MMOL/L (ref 21–32)
COLOR: ABNORMAL
CREAT SERPL-MCNC: 0.7 MG/DL (ref 0.6–1.1)
DIFFERENTIAL TYPE: ABNORMAL
EOSINOPHILS ABSOLUTE: 0.3 K/CU MM
EOSINOPHILS RELATIVE PERCENT: 5.9 % (ref 0–3)
GFR AFRICAN AMERICAN: >60 ML/MIN/1.73M2
GFR NON-AFRICAN AMERICAN: >60 ML/MIN/1.73M2
GLUCOSE BLD-MCNC: 96 MG/DL (ref 70–99)
GLUCOSE, URINE: NEGATIVE MG/DL
HCT VFR BLD CALC: 34.9 % (ref 37–47)
HEMOGLOBIN: 10.5 GM/DL (ref 12.5–16)
IMMATURE NEUTROPHIL %: 0.2 % (ref 0–0.43)
KETONES, URINE: NEGATIVE MG/DL
LACTATE: 1.1 MMOL/L (ref 0.4–2)
LEUKOCYTE ESTERASE, URINE: NEGATIVE
LIPASE: 12 IU/L (ref 13–60)
LYMPHOCYTES ABSOLUTE: 1.7 K/CU MM
LYMPHOCYTES RELATIVE PERCENT: 33.5 % (ref 24–44)
MCH RBC QN AUTO: 24.5 PG (ref 27–31)
MCHC RBC AUTO-ENTMCNC: 30.1 % (ref 32–36)
MCV RBC AUTO: 81.4 FL (ref 78–100)
MONOCYTES ABSOLUTE: 0.4 K/CU MM
MONOCYTES RELATIVE PERCENT: 7.7 % (ref 0–4)
NITRITE URINE, QUANTITATIVE: NEGATIVE
NUCLEATED RBC %: 0 %
PDW BLD-RTO: 14.4 % (ref 11.7–14.9)
PH, URINE: 7 (ref 5–8)
PLATELET # BLD: 205 K/CU MM (ref 140–440)
PMV BLD AUTO: 10.3 FL (ref 7.5–11.1)
POTASSIUM SERPL-SCNC: 4 MMOL/L (ref 3.5–5.1)
PROTEIN UA: NEGATIVE MG/DL
RBC # BLD: 4.29 M/CU MM (ref 4.2–5.4)
RBC URINE: ABNORMAL /HPF (ref 0–6)
SEGMENTED NEUTROPHILS ABSOLUTE COUNT: 2.6 K/CU MM
SEGMENTED NEUTROPHILS RELATIVE PERCENT: 52.3 % (ref 36–66)
SODIUM BLD-SCNC: 138 MMOL/L (ref 135–145)
SPECIFIC GRAVITY UA: 1.01 (ref 1–1.03)
SQUAMOUS EPITHELIAL: 5 /HPF
TOTAL IMMATURE NEUTOROPHIL: 0.01 K/CU MM
TOTAL NUCLEATED RBC: 0 K/CU MM
TOTAL PROTEIN: 7.5 GM/DL (ref 6.4–8.2)
TRICHOMONAS: ABNORMAL /HPF
UROBILINOGEN, URINE: NORMAL MG/DL (ref 0.2–1)
WBC # BLD: 5 K/CU MM (ref 4–10.5)
WBC UA: <1 /HPF (ref 0–5)

## 2019-06-12 PROCEDURE — 85025 COMPLETE CBC W/AUTO DIFF WBC: CPT

## 2019-06-12 PROCEDURE — 83690 ASSAY OF LIPASE: CPT

## 2019-06-12 PROCEDURE — 80053 COMPREHEN METABOLIC PANEL: CPT

## 2019-06-12 PROCEDURE — 81001 URINALYSIS AUTO W/SCOPE: CPT

## 2019-06-12 PROCEDURE — 96375 TX/PRO/DX INJ NEW DRUG ADDON: CPT

## 2019-06-12 PROCEDURE — 87040 BLOOD CULTURE FOR BACTERIA: CPT

## 2019-06-12 PROCEDURE — 2580000003 HC RX 258: Performed by: PHYSICIAN ASSISTANT

## 2019-06-12 PROCEDURE — 87186 SC STD MICRODIL/AGAR DIL: CPT

## 2019-06-12 PROCEDURE — 73630 X-RAY EXAM OF FOOT: CPT

## 2019-06-12 PROCEDURE — 6360000002 HC RX W HCPCS: Performed by: PHYSICIAN ASSISTANT

## 2019-06-12 PROCEDURE — 6370000000 HC RX 637 (ALT 250 FOR IP): Performed by: PHYSICIAN ASSISTANT

## 2019-06-12 PROCEDURE — 87077 CULTURE AEROBIC IDENTIFY: CPT

## 2019-06-12 PROCEDURE — 83605 ASSAY OF LACTIC ACID: CPT

## 2019-06-12 PROCEDURE — 93971 EXTREMITY STUDY: CPT

## 2019-06-12 PROCEDURE — 96361 HYDRATE IV INFUSION ADD-ON: CPT

## 2019-06-12 PROCEDURE — 99284 EMERGENCY DEPT VISIT MOD MDM: CPT

## 2019-06-12 RX ORDER — 0.9 % SODIUM CHLORIDE 0.9 %
1000 INTRAVENOUS SOLUTION INTRAVENOUS ONCE
Status: COMPLETED | OUTPATIENT
Start: 2019-06-12 | End: 2019-06-13

## 2019-06-12 RX ORDER — ONDANSETRON 2 MG/ML
4 INJECTION INTRAMUSCULAR; INTRAVENOUS ONCE
Status: COMPLETED | OUTPATIENT
Start: 2019-06-12 | End: 2019-06-12

## 2019-06-12 RX ORDER — ACETAMINOPHEN 500 MG
1000 TABLET ORAL ONCE
Status: COMPLETED | OUTPATIENT
Start: 2019-06-12 | End: 2019-06-12

## 2019-06-12 RX ORDER — MORPHINE SULFATE 4 MG/ML
4 INJECTION, SOLUTION INTRAMUSCULAR; INTRAVENOUS ONCE
Status: COMPLETED | OUTPATIENT
Start: 2019-06-12 | End: 2019-06-12

## 2019-06-12 RX ADMIN — ACETAMINOPHEN 1000 MG: 500 TABLET ORAL at 23:14

## 2019-06-12 RX ADMIN — MORPHINE SULFATE 4 MG: 4 INJECTION INTRAVENOUS at 23:14

## 2019-06-12 RX ADMIN — SODIUM CHLORIDE 1000 ML: 9 INJECTION, SOLUTION INTRAVENOUS at 23:13

## 2019-06-12 RX ADMIN — ONDANSETRON 4 MG: 2 INJECTION INTRAMUSCULAR; INTRAVENOUS at 23:14

## 2019-06-12 ASSESSMENT — PAIN SCALES - GENERAL: PAINLEVEL_OUTOF10: 10

## 2019-06-12 ASSESSMENT — PAIN DESCRIPTION - PAIN TYPE: TYPE: ACUTE PAIN

## 2019-06-12 ASSESSMENT — PAIN DESCRIPTION - LOCATION: LOCATION: FOOT

## 2019-06-13 ENCOUNTER — APPOINTMENT (OUTPATIENT)
Dept: CT IMAGING | Age: 49
DRG: 501 | End: 2019-06-13
Payer: COMMERCIAL

## 2019-06-13 VITALS
WEIGHT: 271 LBS | BODY MASS INDEX: 46.26 KG/M2 | HEART RATE: 74 BPM | DIASTOLIC BLOOD PRESSURE: 70 MMHG | TEMPERATURE: 98.3 F | RESPIRATION RATE: 18 BRPM | SYSTOLIC BLOOD PRESSURE: 118 MMHG | OXYGEN SATURATION: 98 % | HEIGHT: 64 IN

## 2019-06-13 PROCEDURE — 87150 DNA/RNA AMPLIFIED PROBE: CPT

## 2019-06-13 PROCEDURE — 87071 CULTURE AEROBIC QUANT OTHER: CPT

## 2019-06-13 PROCEDURE — 96372 THER/PROPH/DIAG INJ SC/IM: CPT

## 2019-06-13 PROCEDURE — 6360000002 HC RX W HCPCS: Performed by: PHYSICIAN ASSISTANT

## 2019-06-13 PROCEDURE — 96365 THER/PROPH/DIAG IV INF INIT: CPT

## 2019-06-13 PROCEDURE — 87147 CULTURE TYPE IMMUNOLOGIC: CPT

## 2019-06-13 PROCEDURE — 87077 CULTURE AEROBIC IDENTIFY: CPT

## 2019-06-13 PROCEDURE — 87040 BLOOD CULTURE FOR BACTERIA: CPT

## 2019-06-13 PROCEDURE — 96361 HYDRATE IV INFUSION ADD-ON: CPT

## 2019-06-13 PROCEDURE — 87073 CULTURE BACTERIA ANAEROBIC: CPT

## 2019-06-13 PROCEDURE — 96376 TX/PRO/DX INJ SAME DRUG ADON: CPT

## 2019-06-13 PROCEDURE — 73700 CT LOWER EXTREMITY W/O DYE: CPT

## 2019-06-13 PROCEDURE — 87186 SC STD MICRODIL/AGAR DIL: CPT

## 2019-06-13 RX ORDER — VANCOMYCIN HYDROCHLORIDE 1 G/200ML
1000 INJECTION, SOLUTION INTRAVENOUS ONCE
Status: COMPLETED | OUTPATIENT
Start: 2019-06-13 | End: 2019-06-13

## 2019-06-13 RX ORDER — PROMETHAZINE HYDROCHLORIDE 25 MG/ML
6.25 INJECTION, SOLUTION INTRAMUSCULAR; INTRAVENOUS ONCE
Status: DISCONTINUED | OUTPATIENT
Start: 2019-06-13 | End: 2019-06-13

## 2019-06-13 RX ORDER — MORPHINE SULFATE 4 MG/ML
4 INJECTION, SOLUTION INTRAMUSCULAR; INTRAVENOUS
Status: DISCONTINUED | OUTPATIENT
Start: 2019-06-13 | End: 2019-06-13 | Stop reason: HOSPADM

## 2019-06-13 RX ORDER — PROMETHAZINE HYDROCHLORIDE 25 MG/ML
25 INJECTION, SOLUTION INTRAMUSCULAR; INTRAVENOUS ONCE
Status: COMPLETED | OUTPATIENT
Start: 2019-06-13 | End: 2019-06-13

## 2019-06-13 RX ORDER — PROMETHAZINE HYDROCHLORIDE 25 MG/1
25 TABLET ORAL ONCE
Status: DISCONTINUED | OUTPATIENT
Start: 2019-06-13 | End: 2019-06-13

## 2019-06-13 RX ADMIN — PROMETHAZINE HYDROCHLORIDE 25 MG: 25 INJECTION INTRAMUSCULAR; INTRAVENOUS at 02:14

## 2019-06-13 RX ADMIN — MORPHINE SULFATE 4 MG: 4 INJECTION INTRAVENOUS at 01:33

## 2019-06-13 RX ADMIN — VANCOMYCIN HYDROCHLORIDE 1000 MG: 1 INJECTION, SOLUTION INTRAVENOUS at 01:33

## 2019-06-13 RX ADMIN — MORPHINE SULFATE 4 MG: 4 INJECTION INTRAVENOUS at 02:57

## 2019-06-13 RX ADMIN — MORPHINE SULFATE 4 MG: 4 INJECTION INTRAVENOUS at 04:00

## 2019-06-13 ASSESSMENT — PAIN SCALES - GENERAL
PAINLEVEL_OUTOF10: 7
PAINLEVEL_OUTOF10: 10
PAINLEVEL_OUTOF10: 7

## 2019-06-13 NOTE — ED NOTES
XR FOOT LEFT (MIN 3 VIEWS)   Status: Final result   Order Providers     Authorizing Billing   CYRIL Tom MD          Signed by     Signed Date/Time  Phone Pager   Nicolas Lanier IV 6/13/2019 00:16 [phone]    Reading Radiologists     Read Date Phone Pager   Nicolas BANKS IV Jun 13, 2019 [phone]    Radiation Dose Estimates     No radiation information found for this patient   Narrative   EXAMINATION:   3 XRAY VIEWS OF THE LEFT FOOT       6/12/2019 10:31 pm       COMPARISON:   07/20/2018 radiograph       HISTORY:   ORDERING SYSTEM PROVIDED HISTORY: concern for infection   TECHNOLOGIST PROVIDED HISTORY:   Reason for exam:->concern for infection   Ordering Physician Provided Reason for Exam: concern for infection   Acuity: Acute   Type of Exam: Initial   Mechanism of Injury: concern for infection   Relevant Medical/Surgical History: concern for infection       FINDINGS:   No acute fracture or dislocation.  Slight progression of degenerative change   at the 3rd MTP joint with osteophyte formation noted.  Osseous fusion at the   1st MTP joint is stable, sequela of prior arthrodesis.  Interphalangeal joint   space narrowing and osteophyte formation is also observed in the DIP joints. A lucent lesion appears slightly increased in size at the base of the 1st   metatarsal measuring 11 mm.  This has a thick sclerotic rim with no   aggressive features noted.  There are no soft tissue abnormalities throughout.           Impression   Increased size of a nonaggressive appearing lucent lesion at the base of the   1st metatarsal.  Differential considerations would include osteoarthritis   with subchondral cyst, gout or enchondroma.  Osteomyelitis is considered   unlikely.  Otherwise, relatively stable appearance of the left foot with   postsurgical change of arthrodesis at the 1st MTP joint and degenerative   changes of osteoarthritis elsewhere above.           Ivelisse Almanza,

## 2019-06-13 NOTE — ED PROVIDER NOTES
Emergency [address] EMERGENCY DEPARTMENT    Patient: Nithya Saba  MRN: [id number]  : 1970  Date of Evaluation: 2019  ED Provider: [address], PA-C    Chief Complaint       Chief Complaint   Patient presents with    Wound Check     left foot wound, drainage, vomiting, abnormal labs, abdominal pain       Abiodun Mckenna is a 46 y.o. female who presents to the emergency department for foot pain and abdominal pain. 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 Tuan Valencia. She states she has had green purulent drainage from the foot for a month and is scheduled to have a CT done in 2 days. She states she was in [place] visiting her son and she developed a red fluctuant area along the medial foot. She was seen in an ED down there and they wanted to admit her but she states she wanted to come back here where Dr. Tuan Valencia is. She states she was going to wait to see him in the office but today she started having abdominal pain and vomiting as well. She is now concerned that she is septic OR has pancreatitis--which she has had in the past.  Denies cp or sob. Denies urinary symptoms. She is not on any blood thinners. She is currently on Augmentin for the foot. ROS     CONSTITUTIONAL:  Denies fever. EYES:  Denies visual changes. HEAD:  Denies headache. ENT:  Denies earache, nasal congestion, sore throat. NECK:  Denies neck pain. RESPIRATORY:  Denies any shortness of breath. CARDIOVASCULAR:  Denies chest pain. GI:  + nv   :  Denies urinary symptoms. MUSCULOSKELETAL:  + foot pain. BACK:  Denies back pain. INTEGUMENT:  + foot redness/lesion. LYMPHATIC:  Denies lymphadenopathy. NEUROLOGIC:  Denies any numbness/tingling. PSYCHIATRIC:  Denies SI/HI.     Past History     Past Medical History:   Diagnosis Date    Acid reflux     Anemia     Anxiety     Arthritis     Hands, None     Emotionally abused: None     Physically abused: None     Forced sexual activity: None   Other Topics Concern    None   Social History Narrative    None       Medications/Allergies     Discharge Medication List as of 6/13/2019  3:56 AM      CONTINUE these medications which have NOT CHANGED    Details   promethazine (PHENERGAN) 25 MG tablet Take 1 tablet by mouth every 6 hours as needed for Nausea, Disp-35 tablet, R-3Normal      !! pantoprazole (PROTONIX) 20 MG tablet Take 2 tablets by mouth 2 times daily, Disp-60 tablet, R-0Print      HYDROcodone-acetaminophen (NORCO) 7.5-325 MG per tablet Take 1 tablet by mouth every 8 hours as needed for Pain. Historical Med      ondansetron (ZOFRAN) 4 MG tablet Take 4 mg by mouth every 8 hours as needed for Nausea or VomitingHistorical Med      estradiol (ESTRACE) 0.5 MG tablet Take 0.5 mg by mouth dailyHistorical Med      dicyclomine (BENTYL) 20 MG tablet Take 20 mg by mouth every 6 hoursHistorical Med      atenolol (TENORMIN) 25 MG tablet Take 25 mg by mouth dailyHistorical Med      cloNIDine (CATAPRES) 0.1 MG tablet Take 0.1 mg by mouth 2 times dailyHistorical Med      Multiple Vitamin (MVI, BARIATRIC ADVANTAGE MULTI-FORMULA, CHEW TAB) Take 1 tablet by mouth daily, Disp-30 tablet, R-5Normal      Calcium Citrate-Vitamin D (CALCIUM + VIT D, BARIATRIC ADVANTAGE, CHEWABLE TABLET) Take 1 tablet by mouth 2 times daily, Disp-120 tablet, R-5Normal      !! pantoprazole (PROTONIX) 40 MG tablet Take 1 tablet by mouth 2 times daily, Disp-60 tablet, R-3Normal      Nutritional Supplements (ENSURE HIGH PROTEIN) LIQD Take 1 Can by mouth 4 times daily 2 week pre op diet. No other foods. , Disp-120 Can, R-0Print      levothyroxine (SYNTHROID) 125 MCG tablet Take 1 tablet by mouth Daily, Disp-30 tablet, R-0Print      gabapentin (NEURONTIN) 800 MG tablet Take 800 mg by mouth 3 times daily      PARoxetine (PAXIL) 30 MG tablet Take 30 mg by mouth nightly        !! - Potential duplicate

## 2019-06-14 ENCOUNTER — HOSPITAL ENCOUNTER (INPATIENT)
Age: 49
LOS: 11 days | Discharge: HOME HEALTH | DRG: 501 | End: 2019-06-26
Attending: EMERGENCY MEDICINE | Admitting: FAMILY MEDICINE
Payer: COMMERCIAL

## 2019-06-14 DIAGNOSIS — L03.116 MRSA CELLULITIS OF LEFT FOOT: ICD-10-CM

## 2019-06-14 DIAGNOSIS — B95.62 MRSA CELLULITIS OF LEFT FOOT: ICD-10-CM

## 2019-06-14 DIAGNOSIS — R78.81 BACTEREMIA: Primary | ICD-10-CM

## 2019-06-14 LAB
ALBUMIN SERPL-MCNC: 3.7 GM/DL (ref 3.4–5)
ALP BLD-CCNC: 137 IU/L (ref 40–129)
ALT SERPL-CCNC: 20 U/L (ref 10–40)
ANION GAP SERPL CALCULATED.3IONS-SCNC: 11 MMOL/L (ref 4–16)
AST SERPL-CCNC: 21 IU/L (ref 15–37)
BASOPHILS ABSOLUTE: 0 K/CU MM
BASOPHILS RELATIVE PERCENT: 0.4 % (ref 0–1)
BILIRUB SERPL-MCNC: 0.2 MG/DL (ref 0–1)
BUN BLDV-MCNC: 13 MG/DL (ref 6–23)
CALCIUM SERPL-MCNC: 9.9 MG/DL (ref 8.3–10.6)
CHLORIDE BLD-SCNC: 104 MMOL/L (ref 99–110)
CO2: 22 MMOL/L (ref 21–32)
CREAT SERPL-MCNC: 0.8 MG/DL (ref 0.6–1.1)
DIFFERENTIAL TYPE: ABNORMAL
EOSINOPHILS ABSOLUTE: 0.2 K/CU MM
EOSINOPHILS RELATIVE PERCENT: 5.1 % (ref 0–3)
GFR AFRICAN AMERICAN: >60 ML/MIN/1.73M2
GFR NON-AFRICAN AMERICAN: >60 ML/MIN/1.73M2
GLUCOSE BLD-MCNC: 103 MG/DL (ref 70–99)
HCT VFR BLD CALC: 33.1 % (ref 37–47)
HEMOGLOBIN: 9 GM/DL (ref 12.5–16)
IMMATURE NEUTROPHIL %: 0.2 % (ref 0–0.43)
LACTIC ACID, SEPSIS: 0.7 MMOL/L (ref 0.5–1.9)
LIPASE: 11 IU/L (ref 13–60)
LYMPHOCYTES ABSOLUTE: 1.6 K/CU MM
LYMPHOCYTES RELATIVE PERCENT: 35.4 % (ref 24–44)
MCH RBC QN AUTO: 24.3 PG (ref 27–31)
MCHC RBC AUTO-ENTMCNC: 27.2 % (ref 32–36)
MCV RBC AUTO: 89.2 FL (ref 78–100)
MONOCYTES ABSOLUTE: 0.4 K/CU MM
MONOCYTES RELATIVE PERCENT: 9.5 % (ref 0–4)
NUCLEATED RBC %: 0 %
PDW BLD-RTO: 14.2 % (ref 11.7–14.9)
PLATELET # BLD: 177 K/CU MM (ref 140–440)
PMV BLD AUTO: 10.6 FL (ref 7.5–11.1)
POTASSIUM SERPL-SCNC: 4.1 MMOL/L (ref 3.5–5.1)
RBC # BLD: 3.71 M/CU MM (ref 4.2–5.4)
SEGMENTED NEUTROPHILS ABSOLUTE COUNT: 2.2 K/CU MM
SEGMENTED NEUTROPHILS RELATIVE PERCENT: 49.4 % (ref 36–66)
SODIUM BLD-SCNC: 137 MMOL/L (ref 135–145)
TOTAL IMMATURE NEUTOROPHIL: 0.01 K/CU MM
TOTAL NUCLEATED RBC: 0 K/CU MM
TOTAL PROTEIN: 6.1 GM/DL (ref 6.4–8.2)
WBC # BLD: 4.5 K/CU MM (ref 4–10.5)

## 2019-06-14 PROCEDURE — 2580000003 HC RX 258: Performed by: EMERGENCY MEDICINE

## 2019-06-14 PROCEDURE — 6370000000 HC RX 637 (ALT 250 FOR IP): Performed by: EMERGENCY MEDICINE

## 2019-06-14 PROCEDURE — 87040 BLOOD CULTURE FOR BACTERIA: CPT

## 2019-06-14 PROCEDURE — 85025 COMPLETE CBC W/AUTO DIFF WBC: CPT

## 2019-06-14 PROCEDURE — 83690 ASSAY OF LIPASE: CPT

## 2019-06-14 PROCEDURE — 83605 ASSAY OF LACTIC ACID: CPT

## 2019-06-14 PROCEDURE — 99285 EMERGENCY DEPT VISIT HI MDM: CPT

## 2019-06-14 PROCEDURE — 96365 THER/PROPH/DIAG IV INF INIT: CPT

## 2019-06-14 PROCEDURE — 6360000002 HC RX W HCPCS: Performed by: EMERGENCY MEDICINE

## 2019-06-14 PROCEDURE — 96375 TX/PRO/DX INJ NEW DRUG ADDON: CPT

## 2019-06-14 PROCEDURE — 80053 COMPREHEN METABOLIC PANEL: CPT

## 2019-06-14 PROCEDURE — 36415 COLL VENOUS BLD VENIPUNCTURE: CPT

## 2019-06-14 RX ORDER — MORPHINE SULFATE 4 MG/ML
8 INJECTION, SOLUTION INTRAMUSCULAR; INTRAVENOUS ONCE
Status: COMPLETED | OUTPATIENT
Start: 2019-06-14 | End: 2019-06-14

## 2019-06-14 RX ORDER — 0.9 % SODIUM CHLORIDE 0.9 %
1000 INTRAVENOUS SOLUTION INTRAVENOUS ONCE
Status: COMPLETED | OUTPATIENT
Start: 2019-06-14 | End: 2019-06-15

## 2019-06-14 RX ORDER — ONDANSETRON 4 MG/1
8 TABLET, ORALLY DISINTEGRATING ORAL ONCE
Status: COMPLETED | OUTPATIENT
Start: 2019-06-14 | End: 2019-06-14

## 2019-06-14 RX ADMIN — ONDANSETRON 8 MG: 4 TABLET, ORALLY DISINTEGRATING ORAL at 23:21

## 2019-06-14 RX ADMIN — SODIUM CHLORIDE 1000 ML: 9 INJECTION, SOLUTION INTRAVENOUS at 23:21

## 2019-06-14 RX ADMIN — VANCOMYCIN HYDROCHLORIDE 2000 MG: 1 INJECTION, POWDER, LYOPHILIZED, FOR SOLUTION INTRAVENOUS at 23:41

## 2019-06-14 RX ADMIN — MORPHINE SULFATE 8 MG: 4 INJECTION INTRAVENOUS at 23:31

## 2019-06-14 ASSESSMENT — PAIN DESCRIPTION - LOCATION: LOCATION: FOOT

## 2019-06-14 ASSESSMENT — PAIN DESCRIPTION - ORIENTATION: ORIENTATION: LEFT

## 2019-06-14 ASSESSMENT — PAIN DESCRIPTION - PAIN TYPE: TYPE: ACUTE PAIN

## 2019-06-14 ASSESSMENT — PAIN SCALES - GENERAL
PAINLEVEL_OUTOF10: 8
PAINLEVEL_OUTOF10: 7

## 2019-06-15 ENCOUNTER — APPOINTMENT (OUTPATIENT)
Dept: CT IMAGING | Age: 49
DRG: 501 | End: 2019-06-15
Payer: COMMERCIAL

## 2019-06-15 ENCOUNTER — APPOINTMENT (OUTPATIENT)
Dept: GENERAL RADIOLOGY | Age: 49
DRG: 501 | End: 2019-06-15
Payer: COMMERCIAL

## 2019-06-15 PROBLEM — R78.81 BACTEREMIA: Status: ACTIVE | Noted: 2019-06-15

## 2019-06-15 LAB
ANION GAP SERPL CALCULATED.3IONS-SCNC: 10 MMOL/L (ref 4–16)
ANION GAP SERPL CALCULATED.3IONS-SCNC: 13 MMOL/L (ref 4–16)
BUN BLDV-MCNC: 10 MG/DL (ref 6–23)
BUN BLDV-MCNC: 10 MG/DL (ref 6–23)
CALCIUM SERPL-MCNC: 10 MG/DL (ref 8.3–10.6)
CALCIUM SERPL-MCNC: 10.1 MG/DL (ref 8.3–10.6)
CHLORIDE BLD-SCNC: 107 MMOL/L (ref 99–110)
CHLORIDE BLD-SCNC: 108 MMOL/L (ref 99–110)
CO2: 18 MMOL/L (ref 21–32)
CO2: 23 MMOL/L (ref 21–32)
CREAT SERPL-MCNC: 0.7 MG/DL (ref 0.6–1.1)
CREAT SERPL-MCNC: 0.7 MG/DL (ref 0.6–1.1)
GFR AFRICAN AMERICAN: >60 ML/MIN/1.73M2
GFR AFRICAN AMERICAN: >60 ML/MIN/1.73M2
GFR NON-AFRICAN AMERICAN: >60 ML/MIN/1.73M2
GFR NON-AFRICAN AMERICAN: >60 ML/MIN/1.73M2
GLUCOSE BLD-MCNC: 103 MG/DL (ref 70–99)
GLUCOSE BLD-MCNC: 104 MG/DL (ref 70–99)
GLUCOSE BLD-MCNC: 117 MG/DL (ref 70–99)
HCT VFR BLD CALC: 32.1 % (ref 37–47)
HEMOGLOBIN: 9.4 GM/DL (ref 12.5–16)
LACTATE: 1.2 MMOL/L (ref 0.4–2)
MCH RBC QN AUTO: 24.4 PG (ref 27–31)
MCHC RBC AUTO-ENTMCNC: 29.3 % (ref 32–36)
MCV RBC AUTO: 83.2 FL (ref 78–100)
PDW BLD-RTO: 14.3 % (ref 11.7–14.9)
PLATELET # BLD: 142 K/CU MM (ref 140–440)
PMV BLD AUTO: 11.8 FL (ref 7.5–11.1)
POTASSIUM SERPL-SCNC: 4.5 MMOL/L (ref 3.5–5.1)
POTASSIUM SERPL-SCNC: 4.6 MMOL/L (ref 3.5–5.1)
RBC # BLD: 3.86 M/CU MM (ref 4.2–5.4)
SODIUM BLD-SCNC: 138 MMOL/L (ref 135–145)
SODIUM BLD-SCNC: 141 MMOL/L (ref 135–145)
WBC # BLD: 3.7 K/CU MM (ref 4–10.5)

## 2019-06-15 PROCEDURE — 80048 BASIC METABOLIC PNL TOTAL CA: CPT

## 2019-06-15 PROCEDURE — 83605 ASSAY OF LACTIC ACID: CPT

## 2019-06-15 PROCEDURE — 6360000002 HC RX W HCPCS: Performed by: HOSPITALIST

## 2019-06-15 PROCEDURE — 82962 GLUCOSE BLOOD TEST: CPT

## 2019-06-15 PROCEDURE — 2060000000 HC ICU INTERMEDIATE R&B

## 2019-06-15 PROCEDURE — 6370000000 HC RX 637 (ALT 250 FOR IP): Performed by: FAMILY MEDICINE

## 2019-06-15 PROCEDURE — 70450 CT HEAD/BRAIN W/O DYE: CPT

## 2019-06-15 PROCEDURE — 2580000003 HC RX 258: Performed by: FAMILY MEDICINE

## 2019-06-15 PROCEDURE — 6360000002 HC RX W HCPCS: Performed by: FAMILY MEDICINE

## 2019-06-15 PROCEDURE — 74019 RADEX ABDOMEN 2 VIEWS: CPT

## 2019-06-15 PROCEDURE — 85027 COMPLETE CBC AUTOMATED: CPT

## 2019-06-15 PROCEDURE — 87040 BLOOD CULTURE FOR BACTERIA: CPT

## 2019-06-15 PROCEDURE — 36415 COLL VENOUS BLD VENIPUNCTURE: CPT

## 2019-06-15 PROCEDURE — 99222 1ST HOSP IP/OBS MODERATE 55: CPT | Performed by: SURGERY

## 2019-06-15 RX ORDER — PROMETHAZINE HYDROCHLORIDE 25 MG/ML
25 INJECTION, SOLUTION INTRAMUSCULAR; INTRAVENOUS ONCE
Status: COMPLETED | OUTPATIENT
Start: 2019-06-15 | End: 2019-06-15

## 2019-06-15 RX ORDER — ONDANSETRON 2 MG/ML
4 INJECTION INTRAMUSCULAR; INTRAVENOUS EVERY 6 HOURS PRN
Status: DISCONTINUED | OUTPATIENT
Start: 2019-06-15 | End: 2019-06-18

## 2019-06-15 RX ORDER — ESTRADIOL 1 MG/1
0.5 TABLET ORAL DAILY
Status: DISCONTINUED | OUTPATIENT
Start: 2019-06-15 | End: 2019-06-26 | Stop reason: HOSPADM

## 2019-06-15 RX ORDER — PANTOPRAZOLE SODIUM 40 MG/1
40 TABLET, DELAYED RELEASE ORAL
Status: DISCONTINUED | OUTPATIENT
Start: 2019-06-15 | End: 2019-06-26 | Stop reason: HOSPADM

## 2019-06-15 RX ORDER — ATENOLOL 25 MG/1
25 TABLET ORAL DAILY
Status: DISCONTINUED | OUTPATIENT
Start: 2019-06-15 | End: 2019-06-26 | Stop reason: HOSPADM

## 2019-06-15 RX ORDER — TIZANIDINE 4 MG/1
4 TABLET ORAL 2 TIMES DAILY
Status: ON HOLD | COMMUNITY
End: 2020-07-23 | Stop reason: HOSPADM

## 2019-06-15 RX ORDER — VANCOMYCIN HYDROCHLORIDE 1 G/200ML
1000 INJECTION, SOLUTION INTRAVENOUS EVERY 12 HOURS
Status: DISCONTINUED | OUTPATIENT
Start: 2019-06-15 | End: 2019-06-15 | Stop reason: DRUGHIGH

## 2019-06-15 RX ORDER — GABAPENTIN 400 MG/1
800 CAPSULE ORAL 3 TIMES DAILY
Status: DISCONTINUED | OUTPATIENT
Start: 2019-06-15 | End: 2019-06-26 | Stop reason: HOSPADM

## 2019-06-15 RX ORDER — LEVOTHYROXINE SODIUM 0.12 MG/1
125 TABLET ORAL DAILY
Status: DISCONTINUED | OUTPATIENT
Start: 2019-06-15 | End: 2019-06-26 | Stop reason: HOSPADM

## 2019-06-15 RX ORDER — PROMETHAZINE HYDROCHLORIDE 25 MG/ML
12.5 INJECTION, SOLUTION INTRAMUSCULAR; INTRAVENOUS EVERY 6 HOURS PRN
Status: DISCONTINUED | OUTPATIENT
Start: 2019-06-15 | End: 2019-06-16

## 2019-06-15 RX ORDER — HYDROCODONE BITARTRATE AND ACETAMINOPHEN 7.5; 325 MG/1; MG/1
1 TABLET ORAL EVERY 6 HOURS PRN
Status: DISCONTINUED | OUTPATIENT
Start: 2019-06-15 | End: 2019-06-17

## 2019-06-15 RX ORDER — PROMETHAZINE HYDROCHLORIDE 25 MG/ML
6.25 INJECTION, SOLUTION INTRAMUSCULAR; INTRAVENOUS ONCE
Status: DISCONTINUED | OUTPATIENT
Start: 2019-06-15 | End: 2019-06-15

## 2019-06-15 RX ORDER — PROMETHAZINE HYDROCHLORIDE 25 MG/1
25 TABLET ORAL EVERY 6 HOURS PRN
Status: DISCONTINUED | OUTPATIENT
Start: 2019-06-15 | End: 2019-06-15

## 2019-06-15 RX ORDER — SODIUM CHLORIDE 0.9 % (FLUSH) 0.9 %
10 SYRINGE (ML) INJECTION PRN
Status: DISCONTINUED | OUTPATIENT
Start: 2019-06-15 | End: 2019-06-26 | Stop reason: HOSPADM

## 2019-06-15 RX ORDER — MORPHINE SULFATE 4 MG/ML
2 INJECTION, SOLUTION INTRAMUSCULAR; INTRAVENOUS
Status: DISCONTINUED | OUTPATIENT
Start: 2019-06-15 | End: 2019-06-16

## 2019-06-15 RX ORDER — DICYCLOMINE HCL 20 MG
20 TABLET ORAL EVERY 6 HOURS
Status: DISCONTINUED | OUTPATIENT
Start: 2019-06-15 | End: 2019-06-26 | Stop reason: HOSPADM

## 2019-06-15 RX ORDER — SODIUM CHLORIDE 0.9 % (FLUSH) 0.9 %
10 SYRINGE (ML) INJECTION EVERY 12 HOURS SCHEDULED
Status: DISCONTINUED | OUTPATIENT
Start: 2019-06-15 | End: 2019-06-26 | Stop reason: HOSPADM

## 2019-06-15 RX ORDER — CLONIDINE HYDROCHLORIDE 0.1 MG/1
0.1 TABLET ORAL 2 TIMES DAILY
Status: DISCONTINUED | OUTPATIENT
Start: 2019-06-15 | End: 2019-06-26 | Stop reason: HOSPADM

## 2019-06-15 RX ORDER — PROMETHAZINE HYDROCHLORIDE 25 MG/ML
6.25 INJECTION, SOLUTION INTRAMUSCULAR; INTRAVENOUS EVERY 6 HOURS PRN
Status: DISCONTINUED | OUTPATIENT
Start: 2019-06-15 | End: 2019-06-15

## 2019-06-15 RX ORDER — ACETAMINOPHEN 325 MG/1
650 TABLET ORAL EVERY 4 HOURS PRN
Status: DISCONTINUED | OUTPATIENT
Start: 2019-06-15 | End: 2019-06-26 | Stop reason: HOSPADM

## 2019-06-15 RX ORDER — HYDROMORPHONE HCL 110MG/55ML
1 PATIENT CONTROLLED ANALGESIA SYRINGE INTRAVENOUS ONCE
Status: COMPLETED | OUTPATIENT
Start: 2019-06-15 | End: 2019-06-15

## 2019-06-15 RX ADMIN — PANTOPRAZOLE SODIUM 40 MG: 40 TABLET, DELAYED RELEASE ORAL at 15:30

## 2019-06-15 RX ADMIN — CLONIDINE HYDROCHLORIDE 0.1 MG: 0.1 TABLET ORAL at 08:20

## 2019-06-15 RX ADMIN — ENOXAPARIN SODIUM 40 MG: 40 INJECTION SUBCUTANEOUS at 08:19

## 2019-06-15 RX ADMIN — GABAPENTIN 800 MG: 400 CAPSULE ORAL at 08:20

## 2019-06-15 RX ADMIN — VANCOMYCIN HYDROCHLORIDE 1750 MG: 5 INJECTION, POWDER, LYOPHILIZED, FOR SOLUTION INTRAVENOUS at 15:51

## 2019-06-15 RX ADMIN — PANTOPRAZOLE SODIUM 40 MG: 40 TABLET, DELAYED RELEASE ORAL at 06:07

## 2019-06-15 RX ADMIN — MORPHINE SULFATE 2 MG: 4 INJECTION INTRAVENOUS at 18:31

## 2019-06-15 RX ADMIN — SODIUM CHLORIDE, PRESERVATIVE FREE 10 ML: 5 INJECTION INTRAVENOUS at 20:26

## 2019-06-15 RX ADMIN — DICYCLOMINE HYDROCHLORIDE 20 MG: 20 TABLET ORAL at 08:20

## 2019-06-15 RX ADMIN — CLONIDINE HYDROCHLORIDE 0.1 MG: 0.1 TABLET ORAL at 20:25

## 2019-06-15 RX ADMIN — ONDANSETRON 4 MG: 2 INJECTION INTRAMUSCULAR; INTRAVENOUS at 18:31

## 2019-06-15 RX ADMIN — HYDROCODONE BITARTRATE AND ACETAMINOPHEN 1 TABLET: 7.5; 325 TABLET ORAL at 19:57

## 2019-06-15 RX ADMIN — LEVOTHYROXINE SODIUM 125 MCG: 125 TABLET ORAL at 06:07

## 2019-06-15 RX ADMIN — HYDROCODONE BITARTRATE AND ACETAMINOPHEN 1 TABLET: 7.5; 325 TABLET ORAL at 04:20

## 2019-06-15 RX ADMIN — PROMETHAZINE HYDROCHLORIDE 12.5 MG: 25 INJECTION INTRAMUSCULAR; INTRAVENOUS at 15:30

## 2019-06-15 RX ADMIN — DICYCLOMINE HYDROCHLORIDE 20 MG: 20 TABLET ORAL at 19:56

## 2019-06-15 RX ADMIN — DICYCLOMINE HYDROCHLORIDE 20 MG: 20 TABLET ORAL at 15:30

## 2019-06-15 RX ADMIN — PAROXETINE 30 MG: 10 TABLET, FILM COATED ORAL at 20:25

## 2019-06-15 RX ADMIN — ATENOLOL 25 MG: 25 TABLET ORAL at 08:20

## 2019-06-15 RX ADMIN — GABAPENTIN 800 MG: 400 CAPSULE ORAL at 20:25

## 2019-06-15 RX ADMIN — MORPHINE SULFATE 2 MG: 4 INJECTION INTRAVENOUS at 20:36

## 2019-06-15 RX ADMIN — HYDROMORPHONE HYDROCHLORIDE 1 MG: 2 INJECTION, SOLUTION INTRAMUSCULAR; INTRAVENOUS; SUBCUTANEOUS at 08:17

## 2019-06-15 RX ADMIN — GABAPENTIN 800 MG: 400 CAPSULE ORAL at 15:30

## 2019-06-15 RX ADMIN — PROMETHAZINE HYDROCHLORIDE 25 MG: 25 INJECTION INTRAMUSCULAR; INTRAVENOUS at 02:21

## 2019-06-15 RX ADMIN — ESTRADIOL 0.5 MG: 1 TABLET ORAL at 08:20

## 2019-06-15 ASSESSMENT — ENCOUNTER SYMPTOMS
CHOKING: 0
PHOTOPHOBIA: 0
CONSTIPATION: 0
RECTAL PAIN: 0
STRIDOR: 0
ANAL BLEEDING: 0
SORE THROAT: 0
BACK PAIN: 0
NAUSEA: 1
APNEA: 0
EYE REDNESS: 0
EYE ITCHING: 0
ABDOMINAL PAIN: 1
COLOR CHANGE: 0

## 2019-06-15 ASSESSMENT — PAIN DESCRIPTION - ORIENTATION
ORIENTATION: ANTERIOR
ORIENTATION: LEFT
ORIENTATION: LEFT;LOWER

## 2019-06-15 ASSESSMENT — PAIN SCALES - GENERAL
PAINLEVEL_OUTOF10: 0
PAINLEVEL_OUTOF10: 9
PAINLEVEL_OUTOF10: 6
PAINLEVEL_OUTOF10: 7
PAINLEVEL_OUTOF10: 7
PAINLEVEL_OUTOF10: 6
PAINLEVEL_OUTOF10: 4
PAINLEVEL_OUTOF10: 6
PAINLEVEL_OUTOF10: 7
PAINLEVEL_OUTOF10: 6
PAINLEVEL_OUTOF10: 7

## 2019-06-15 ASSESSMENT — PAIN DESCRIPTION - DESCRIPTORS
DESCRIPTORS: ACHING
DESCRIPTORS: ACHING;SHARP

## 2019-06-15 ASSESSMENT — PAIN DESCRIPTION - LOCATION
LOCATION: ABDOMEN;HEAD
LOCATION: ABDOMEN;BACK;FOOT
LOCATION: ABDOMEN;BACK;FOOT;LEG

## 2019-06-15 ASSESSMENT — PAIN DESCRIPTION - DIRECTION: RADIATING_TOWARDS: BACK

## 2019-06-15 ASSESSMENT — PAIN DESCRIPTION - FREQUENCY: FREQUENCY: CONTINUOUS

## 2019-06-15 ASSESSMENT — PAIN DESCRIPTION - PAIN TYPE
TYPE: CHRONIC PAIN
TYPE: ACUTE PAIN
TYPE: CHRONIC PAIN

## 2019-06-15 NOTE — ED NOTES
Patient requesting port be reaccessed. Difficult to flush,. Port removed and re accessed with sterile technique.  Flushes with out difficulty           Felicity Romano RN  06/15/19 8901

## 2019-06-15 NOTE — PROGRESS NOTES
HOSPITALIST PROGRESS NOTE  Date: 6/15/2019   Name: Margarita Vázquez   MRN: [id number]   YOB: 1970      Subjective/Interval Hx:   Patient states that she has been having problems with the left foot every since she has had surgery for the last several years and sometimes it gets infected.   She also complains of abdominal pain post bariatric surgery which was done a few months prior so will have surgery consulted for evaluation and treatment    Objective:   Physical Exam:   /66   Pulse 53   Temp 98.5 °F (36.9 °C) (Oral)   Resp 16   Ht 5' 4\" (1.626 m)   Wt 262 lb 14.4 oz (119.3 kg)   SpO2 100%   BMI 45.13 kg/m²   General: no acute distress, well nourished and well hydrated  HEENT: NCAT  Heart: S1S2 RRR  Lungs: Clear to ascultation bilaterally, respiratory effort normal  Abdomen: soft, NT/ND, positive bowel sounds  Extremities: no pitting edema, nontender   Neuro: patient is awake, alert and orientated times 3, no gross deficits  Skin: no rashes or ecchymosis        Meds:   Meds:    atenolol  25 mg Oral Daily    cloNIDine  0.1 mg Oral BID    estradiol  0.5 mg Oral Daily    dicyclomine  20 mg Oral Q6H    gabapentin  800 mg Oral TID    levothyroxine  125 mcg Oral Daily    pantoprazole  40 mg Oral BID AC    PARoxetine  30 mg Oral Nightly    sodium chloride flush  10 mL Intravenous 2 times per day    enoxaparin  40 mg Subcutaneous Daily    vancomycin  1,750 mg Intravenous Q12H      Infusions:   PRN Meds:   HYDROcodone-acetaminophen 1 tablet Q6H PRN   sodium chloride flush 10 mL PRN   magnesium hydroxide 30 mL Daily PRN   ondansetron 4 mg Q6H PRN   acetaminophen 650 mg Q4H PRN   promethazine 12.5 mg Q6H PRN       Data/Labs:     Recent Labs     06/12/19  2310 06/14/19  2307 06/15/19  0708   WBC 5.0 4.5 3.7*   HGB 10.5* 9.0* 9.4*   HCT 34.9* 33.1* 32.1*    177 142      Recent Labs     06/12/19  2310 06/14/19  2307 06/15/19  0708    137 141   K 4.0 4.1 4.5    104

## 2019-06-15 NOTE — PLAN OF CARE
Problem: Pain:  Goal: Pain level will decrease  Description  Pain level will decrease  Outcome: Ongoing  Goal: Control of acute pain  Description  Control of acute pain  Outcome: Ongoing  Goal: Control of chronic pain  Description  Control of chronic pain  Outcome: Ongoing     Problem: Nausea/Vomiting:  Goal: Absence of nausea/vomiting  Description  Absence of nausea/vomiting  Outcome: Ongoing  Goal: Able to drink  Description  Able to drink  Outcome: Ongoing  Goal: Able to eat  Description  Able to eat  Outcome: Ongoing  Goal: Ability to achieve adequate nutritional intake will improve  Description  Ability to achieve adequate nutritional intake will improve  Outcome: Ongoing

## 2019-06-15 NOTE — ED PROVIDER NOTES
Relative 49.4 36 - 66 %    Lymphocytes % 35.4 24 - 44 %    Monocytes % 9.5 (H) 0 - 4 %    Eosinophils % 5.1 (H) 0 - 3 %    Basophils % 0.4 0 - 1 %    Segs Absolute 2.2 K/CU MM    Lymphocytes # 1.6 K/CU MM    Monocytes # 0.4 K/CU MM    Eosinophils # 0.2 K/CU MM    Basophils # 0.0 K/CU MM    Nucleated RBC % 0.0 %    Total Nucleated RBC 0.0 K/CU MM    Total Immature Neutrophil 0.01 K/CU MM    Immature Neutrophil % 0.2 0 - 0.43 %   Comprehensive Metabolic Panel w/ Reflex to MG   Result Value Ref Range    Sodium 137 135 - 145 MMOL/L    Potassium 4.1 3.5 - 5.1 MMOL/L    Chloride 104 99 - 110 mMol/L    CO2 22 21 - 32 MMOL/L    BUN 13 6 - 23 MG/DL    CREATININE 0.8 0.6 - 1.1 MG/DL    Glucose 103 (H) 70 - 99 MG/DL    Calcium 9.9 8.3 - 10.6 MG/DL    Alb 3.7 3.4 - 5.0 GM/DL    Total Protein 6.1 (L) 6.4 - 8.2 GM/DL    Total Bilirubin 0.2 0.0 - 1.0 MG/DL    ALT 20 10 - 40 U/L    AST 21 15 - 37 IU/L    Alkaline Phosphatase 137 (H) 40 - 129 IU/L    GFR Non-African American >60 >60 mL/min/1.73m2    GFR African American >60 >60 mL/min/1.73m2    Anion Gap 11 4 - 16   Lipase   Result Value Ref Range    Lipase 11 (L) 13 - 60 IU/L      Radiographs (if obtained):  [] The following radiograph was interpreted by myself in the absence of a radiologist:  [x] Radiologist's Report Reviewed:    EKG (if obtained): (All EKG's are interpreted by myself in the absence of a cardiologist)    MDM:  Plan of care is discussed thoroughly with the patient and family if present. If performed, all imaging and lab work also discussed with patient. All relevant prior results and chart reviewed if available. Patient presents with previously positive blood cultures. She is hemodynamically stable. Started on IV fluids and vancomycin until this can be speciated and sensitivities determined. She has a benign abdominal exam and has symptoms consistent with her history of acute on chronic pancreatitis. She is given morphine and Zofran for symptoms.   Do not

## 2019-06-15 NOTE — PROGRESS NOTES
Dr Liss Pearson has been made aware that during packing pt belongings for transfer to another floor an empty flush syringe was found in pt purse.

## 2019-06-15 NOTE — ED NOTES
Report given to Neil Evangelista, nurse to assume care at this time.      Julianna Neely RN  06/14/19 1157

## 2019-06-15 NOTE — H&P
History and Physical      Name:  Stefan Last /Age/Sex: 1970  (46 y.o. female)   MRN & CSN:  [id number] & [id number] Admission Date/Time: 2019 10:37 PM   Location:  ED36/ED-36 PCP: No primary care provider on file. Stefan Last is a 46 y.o.  female  who presents with Other (positive blood cultures)      Assessment and Plan:   Staph Bacteremia  - source not clear, could be left foot wound vs left chest port? Clinically pt is well  - IV Vanc  - repeat blood cx pending  - wound cx pending  - consider consult to podiatry if foot concern, pt was to have scheduled procedure next week with Dr Briana Dorantes she states, will hold off MRI for now as clinically appears stable.   - consider ID consult Monday if pt still here    Pt reports hx of pancreatitis and thinks she has it now, but non tender and lipase WNL, keep clears overnight    Seems to be pain seeker? (asking for dilaudid and phenergan constantly but no distress)    COPD  HTN  Hypothyroidism  Depression            Diet No diet orders on file   Code Status Prior     Medications:   Medications:    vancomycin  2,000 mg Intravenous Once      Infusions:   PRN Meds:      Current Facility-Administered Medications:     vancomycin (VANCOCIN) 2,000 mg in dextrose 5 % 500 mL IVPB, 2,000 mg, Intravenous, Once, Dewayne Montes MD, Last Rate: 250 mL/hr at 19 2341, 2,000 mg at 19 2341    Current Outpatient Medications:     promethazine (PHENERGAN) 25 MG tablet, Take 1 tablet by mouth every 6 hours as needed for Nausea, Disp: 35 tablet, Rfl: 3    pantoprazole (PROTONIX) 20 MG tablet, Take 2 tablets by mouth 2 times daily, Disp: 60 tablet, Rfl: 0    HYDROcodone-acetaminophen (NORCO) 7.5-325 MG per tablet, Take 1 tablet by mouth every 8 hours as needed for Pain., Disp: , Rfl:     ondansetron (ZOFRAN) 4 MG tablet, Take 4 mg by mouth every 8 hours as needed for Nausea or Vomiting, Disp: , Rfl:     estradiol (ESTRACE) 0.5 MG tablet, Take 0.5 distress  Head/Eyes:  Normocephalic atraumatic, EOMI   NECK:   symmetrical, trachea midline  LUNGS: Normal Effort   CARDIOVASCULAR:  Normal rate  ABDOMEN: Non tender, non distended, no HSM noted. MUSCULOSKELETAL:  ROM WNL  NEUROLOGIC: Alert and Oriented,  Cranial nerves II-XII are grossly intact. SKIN:  Left foot chronic surgery scar stable      Past Medical History:      Past Medical History:   Diagnosis Date    Acid reflux     Anemia     Anxiety     Arthritis     Hands, Back And Ankles    Bleeding ulcer 2014    \"I Had Ulcers In My Stomach And Colon\"    Bronchitis Last Episode 2014    Chronic back pain     Chronic pain     Sees Dr. Jagjit Staples COPD (chronic obstructive pulmonary disease) ([facility] [address]     Sees Dr. Kris Langford Depression     Fibromyalgia Dx 2013    H/O echocardiogram 8/11/15    EF >55%. LA to be at the upper limit of normal in size. LV hypertrophy with normal LV systolic, but abnormal diastolic function. Normal valvular structures and function.  H/O echocardiogram 08/30/2018    EF 55-60%    Hiatal hernia     History of blood transfusion 09/2015 And 2018    No Reaction To Blood Transfusions Received    [place] (hard of hearing)     Right Ear    Hx of cardiac catheterization 10/24/2010    Angiographically normal coronary arteries w/ normal LV function and wall motion.  Hx of transesophageal echocardiography (PILO) for monitoring 12/2/2010    EF 55-60%. WNL.     Hypertension     Lupus ([facility] [address] Dx 2013    \"Rheumatoid Lupus\"    Morbid obesity ([facility] [address]     Nausea     \"Most Of The Time\"    Pain management     Sees Dr. Sai Kramer, Once A Month    Pancreatitis chronic Dx 2001    Pneumonia Dx 11-15    Shortness of breath on exertion     Shortness of breath on exertion     Sleep apnea     Has CPAP    Staph infection Dx 11-15    Left Foot    Staph infection Dx 11-15    \"Left Foot\"    Teeth missing     Upper And Lower    Thyroid disease     UTI (urinary tract infection) In

## 2019-06-15 NOTE — ED NOTES
Patient requesting pain medication and phenergan dr Bishop Ragland made aware.       Miladis Tracey, JACKIE  06/15/19 0390

## 2019-06-16 ENCOUNTER — ANESTHESIA EVENT (OUTPATIENT)
Dept: OPERATING ROOM | Age: 49
DRG: 501 | End: 2019-06-16
Payer: COMMERCIAL

## 2019-06-16 ENCOUNTER — ANESTHESIA (OUTPATIENT)
Dept: OPERATING ROOM | Age: 49
DRG: 501 | End: 2019-06-16
Payer: COMMERCIAL

## 2019-06-16 ENCOUNTER — APPOINTMENT (OUTPATIENT)
Dept: GENERAL RADIOLOGY | Age: 49
DRG: 501 | End: 2019-06-16
Payer: COMMERCIAL

## 2019-06-16 VITALS
TEMPERATURE: 98.6 F | SYSTOLIC BLOOD PRESSURE: 105 MMHG | DIASTOLIC BLOOD PRESSURE: 89 MMHG | OXYGEN SATURATION: 100 % | RESPIRATION RATE: 32 BRPM

## 2019-06-16 LAB
ANION GAP SERPL CALCULATED.3IONS-SCNC: 10 MMOL/L (ref 4–16)
BASOPHILS ABSOLUTE: 0 K/CU MM
BASOPHILS RELATIVE PERCENT: 0.4 % (ref 0–1)
BUN BLDV-MCNC: 8 MG/DL (ref 6–23)
CALCIUM SERPL-MCNC: 9.4 MG/DL (ref 8.3–10.6)
CHLORIDE BLD-SCNC: 107 MMOL/L (ref 99–110)
CO2: 22 MMOL/L (ref 21–32)
CREAT SERPL-MCNC: 0.7 MG/DL (ref 0.6–1.1)
CULTURE: ABNORMAL
DIFFERENTIAL TYPE: ABNORMAL
DOSE AMOUNT: NORMAL
DOSE TIME: NORMAL
EOSINOPHILS ABSOLUTE: 0.2 K/CU MM
EOSINOPHILS RELATIVE PERCENT: 4.1 % (ref 0–3)
GFR AFRICAN AMERICAN: >60 ML/MIN/1.73M2
GFR NON-AFRICAN AMERICAN: >60 ML/MIN/1.73M2
GLUCOSE BLD-MCNC: 113 MG/DL (ref 70–99)
HCT VFR BLD CALC: 30.8 % (ref 37–47)
HEMOGLOBIN: 9.1 GM/DL (ref 12.5–16)
IMMATURE NEUTROPHIL %: 0.4 % (ref 0–0.43)
LYMPHOCYTES ABSOLUTE: 1.1 K/CU MM
LYMPHOCYTES RELATIVE PERCENT: 20.1 % (ref 24–44)
Lab: ABNORMAL
Lab: ABNORMAL
MCH RBC QN AUTO: 24.3 PG (ref 27–31)
MCHC RBC AUTO-ENTMCNC: 29.5 % (ref 32–36)
MCV RBC AUTO: 82.1 FL (ref 78–100)
MONOCYTES ABSOLUTE: 0.5 K/CU MM
MONOCYTES RELATIVE PERCENT: 9.2 % (ref 0–4)
NUCLEATED RBC %: 0 %
PDW BLD-RTO: 14.4 % (ref 11.7–14.9)
PLATELET # BLD: 171 K/CU MM (ref 140–440)
PMV BLD AUTO: 10.4 FL (ref 7.5–11.1)
POTASSIUM SERPL-SCNC: 4 MMOL/L (ref 3.5–5.1)
RBC # BLD: 3.75 M/CU MM (ref 4.2–5.4)
SEGMENTED NEUTROPHILS ABSOLUTE COUNT: 3.7 K/CU MM
SEGMENTED NEUTROPHILS RELATIVE PERCENT: 65.8 % (ref 36–66)
SODIUM BLD-SCNC: 139 MMOL/L (ref 135–145)
SPECIMEN: ABNORMAL
SPECIMEN: ABNORMAL
TOTAL IMMATURE NEUTOROPHIL: 0.02 K/CU MM
TOTAL NUCLEATED RBC: 0 K/CU MM
VANCOMYCIN TROUGH: 15.5 UG/ML (ref 10–20)
WBC # BLD: 5.7 K/CU MM (ref 4–10.5)

## 2019-06-16 PROCEDURE — 80202 ASSAY OF VANCOMYCIN: CPT

## 2019-06-16 PROCEDURE — 2580000003 HC RX 258: Performed by: NURSE ANESTHETIST, CERTIFIED REGISTERED

## 2019-06-16 PROCEDURE — 0YBN0ZZ EXCISION OF LEFT FOOT, OPEN APPROACH: ICD-10-PCS | Performed by: FAMILY MEDICINE

## 2019-06-16 PROCEDURE — 76000 FLUOROSCOPY <1 HR PHYS/QHP: CPT

## 2019-06-16 PROCEDURE — C1713 ANCHOR/SCREW BN/BN,TIS/BN: HCPCS | Performed by: PODIATRIST

## 2019-06-16 PROCEDURE — 87071 CULTURE AEROBIC QUANT OTHER: CPT

## 2019-06-16 PROCEDURE — 87176 TISSUE HOMOGENIZATION CULTR: CPT

## 2019-06-16 PROCEDURE — 7100000001 HC PACU RECOVERY - ADDTL 15 MIN: Performed by: PODIATRIST

## 2019-06-16 PROCEDURE — 6360000002 HC RX W HCPCS: Performed by: FAMILY MEDICINE

## 2019-06-16 PROCEDURE — 6370000000 HC RX 637 (ALT 250 FOR IP): Performed by: FAMILY MEDICINE

## 2019-06-16 PROCEDURE — 2580000003 HC RX 258: Performed by: FAMILY MEDICINE

## 2019-06-16 PROCEDURE — 6360000002 HC RX W HCPCS: Performed by: PODIATRIST

## 2019-06-16 PROCEDURE — 2060000000 HC ICU INTERMEDIATE R&B

## 2019-06-16 PROCEDURE — 36415 COLL VENOUS BLD VENIPUNCTURE: CPT

## 2019-06-16 PROCEDURE — 99232 SBSQ HOSP IP/OBS MODERATE 35: CPT | Performed by: SURGERY

## 2019-06-16 PROCEDURE — 2709999900 HC NON-CHARGEABLE SUPPLY: Performed by: PODIATRIST

## 2019-06-16 PROCEDURE — 2500000003 HC RX 250 WO HCPCS: Performed by: PODIATRIST

## 2019-06-16 PROCEDURE — 2580000003 HC RX 258: Performed by: ANESTHESIOLOGY

## 2019-06-16 PROCEDURE — 85025 COMPLETE CBC W/AUTO DIFF WBC: CPT

## 2019-06-16 PROCEDURE — 87147 CULTURE TYPE IMMUNOLOGIC: CPT

## 2019-06-16 PROCEDURE — 2580000003 HC RX 258: Performed by: PODIATRIST

## 2019-06-16 PROCEDURE — 3700000001 HC ADD 15 MINUTES (ANESTHESIA): Performed by: PODIATRIST

## 2019-06-16 PROCEDURE — 6360000002 HC RX W HCPCS: Performed by: NURSE ANESTHETIST, CERTIFIED REGISTERED

## 2019-06-16 PROCEDURE — 3600000002 HC SURGERY LEVEL 2 BASE: Performed by: PODIATRIST

## 2019-06-16 PROCEDURE — 87073 CULTURE BACTERIA ANAEROBIC: CPT

## 2019-06-16 PROCEDURE — 6360000002 HC RX W HCPCS: Performed by: HOSPITALIST

## 2019-06-16 PROCEDURE — 87205 SMEAR GRAM STAIN: CPT

## 2019-06-16 PROCEDURE — 3600000012 HC SURGERY LEVEL 2 ADDTL 15MIN: Performed by: PODIATRIST

## 2019-06-16 PROCEDURE — 94761 N-INVAS EAR/PLS OXIMETRY MLT: CPT

## 2019-06-16 PROCEDURE — 87077 CULTURE AEROBIC IDENTIFY: CPT

## 2019-06-16 PROCEDURE — 2500000003 HC RX 250 WO HCPCS: Performed by: NURSE ANESTHETIST, CERTIFIED REGISTERED

## 2019-06-16 PROCEDURE — 7100000000 HC PACU RECOVERY - FIRST 15 MIN: Performed by: PODIATRIST

## 2019-06-16 PROCEDURE — 80048 BASIC METABOLIC PNL TOTAL CA: CPT

## 2019-06-16 PROCEDURE — 3700000000 HC ANESTHESIA ATTENDED CARE: Performed by: PODIATRIST

## 2019-06-16 RX ORDER — DEXAMETHASONE SODIUM PHOSPHATE 4 MG/ML
INJECTION, SOLUTION INTRA-ARTICULAR; INTRALESIONAL; INTRAMUSCULAR; INTRAVENOUS; SOFT TISSUE PRN
Status: DISCONTINUED | OUTPATIENT
Start: 2019-06-16 | End: 2019-06-16 | Stop reason: SDUPTHER

## 2019-06-16 RX ORDER — HYDROMORPHONE HCL 110MG/55ML
0.5 PATIENT CONTROLLED ANALGESIA SYRINGE INTRAVENOUS EVERY 5 MIN PRN
Status: DISCONTINUED | OUTPATIENT
Start: 2019-06-16 | End: 2019-06-16 | Stop reason: HOSPADM

## 2019-06-16 RX ORDER — FLUTICASONE PROPIONATE 50 MCG
1 SPRAY, SUSPENSION (ML) NASAL DAILY
Status: DISCONTINUED | OUTPATIENT
Start: 2019-06-16 | End: 2019-06-26 | Stop reason: HOSPADM

## 2019-06-16 RX ORDER — SODIUM CHLORIDE 9 MG/ML
INJECTION, SOLUTION INTRAVENOUS CONTINUOUS
Status: DISCONTINUED | OUTPATIENT
Start: 2019-06-16 | End: 2019-06-16

## 2019-06-16 RX ORDER — SODIUM CHLORIDE 9 MG/ML
INJECTION, SOLUTION INTRAVENOUS
Status: COMPLETED
Start: 2019-06-16 | End: 2019-06-16

## 2019-06-16 RX ORDER — MEPERIDINE HYDROCHLORIDE 25 MG/ML
12.5 INJECTION INTRAMUSCULAR; INTRAVENOUS; SUBCUTANEOUS EVERY 5 MIN PRN
Status: DISCONTINUED | OUTPATIENT
Start: 2019-06-16 | End: 2019-06-16 | Stop reason: HOSPADM

## 2019-06-16 RX ORDER — ONDANSETRON 2 MG/ML
INJECTION INTRAMUSCULAR; INTRAVENOUS PRN
Status: DISCONTINUED | OUTPATIENT
Start: 2019-06-16 | End: 2019-06-16 | Stop reason: SDUPTHER

## 2019-06-16 RX ORDER — 0.9 % SODIUM CHLORIDE 0.9 %
500 INTRAVENOUS SOLUTION INTRAVENOUS
Status: DISCONTINUED | OUTPATIENT
Start: 2019-06-16 | End: 2019-06-16 | Stop reason: HOSPADM

## 2019-06-16 RX ORDER — PROPOFOL 10 MG/ML
INJECTION, EMULSION INTRAVENOUS PRN
Status: DISCONTINUED | OUTPATIENT
Start: 2019-06-16 | End: 2019-06-16 | Stop reason: SDUPTHER

## 2019-06-16 RX ORDER — SODIUM CHLORIDE 9 MG/ML
INJECTION, SOLUTION INTRAVENOUS CONTINUOUS PRN
Status: DISCONTINUED | OUTPATIENT
Start: 2019-06-16 | End: 2019-06-16 | Stop reason: SDUPTHER

## 2019-06-16 RX ORDER — LABETALOL HYDROCHLORIDE 5 MG/ML
5 INJECTION, SOLUTION INTRAVENOUS EVERY 10 MIN PRN
Status: DISCONTINUED | OUTPATIENT
Start: 2019-06-16 | End: 2019-06-16 | Stop reason: HOSPADM

## 2019-06-16 RX ORDER — GLYCOPYRROLATE 1 MG/5 ML
SYRINGE (ML) INTRAVENOUS PRN
Status: DISCONTINUED | OUTPATIENT
Start: 2019-06-16 | End: 2019-06-16 | Stop reason: SDUPTHER

## 2019-06-16 RX ORDER — LIDOCAINE HYDROCHLORIDE 20 MG/ML
INJECTION, SOLUTION INTRAVENOUS PRN
Status: DISCONTINUED | OUTPATIENT
Start: 2019-06-16 | End: 2019-06-16 | Stop reason: SDUPTHER

## 2019-06-16 RX ORDER — ONDANSETRON 2 MG/ML
4 INJECTION INTRAMUSCULAR; INTRAVENOUS
Status: DISCONTINUED | OUTPATIENT
Start: 2019-06-16 | End: 2019-06-16 | Stop reason: HOSPADM

## 2019-06-16 RX ORDER — GUAIFENESIN 600 MG/1
600 TABLET, EXTENDED RELEASE ORAL 2 TIMES DAILY
Status: DISCONTINUED | OUTPATIENT
Start: 2019-06-16 | End: 2019-06-26 | Stop reason: HOSPADM

## 2019-06-16 RX ORDER — BUPIVACAINE HYDROCHLORIDE 5 MG/ML
INJECTION, SOLUTION PERINEURAL
Status: COMPLETED | OUTPATIENT
Start: 2019-06-16 | End: 2019-06-16

## 2019-06-16 RX ORDER — FENTANYL CITRATE 50 UG/ML
50 INJECTION, SOLUTION INTRAMUSCULAR; INTRAVENOUS EVERY 5 MIN PRN
Status: DISCONTINUED | OUTPATIENT
Start: 2019-06-16 | End: 2019-06-16 | Stop reason: HOSPADM

## 2019-06-16 RX ORDER — MORPHINE SULFATE 4 MG/ML
2 INJECTION, SOLUTION INTRAMUSCULAR; INTRAVENOUS EVERY 4 HOURS PRN
Status: DISCONTINUED | OUTPATIENT
Start: 2019-06-16 | End: 2019-06-17

## 2019-06-16 RX ORDER — FENTANYL CITRATE 50 UG/ML
INJECTION, SOLUTION INTRAMUSCULAR; INTRAVENOUS PRN
Status: DISCONTINUED | OUTPATIENT
Start: 2019-06-16 | End: 2019-06-16 | Stop reason: SDUPTHER

## 2019-06-16 RX ORDER — DIPHENHYDRAMINE HYDROCHLORIDE 50 MG/ML
12.5 INJECTION INTRAMUSCULAR; INTRAVENOUS
Status: DISCONTINUED | OUTPATIENT
Start: 2019-06-16 | End: 2019-06-16 | Stop reason: HOSPADM

## 2019-06-16 RX ORDER — PROMETHAZINE HYDROCHLORIDE 25 MG/ML
6.25 INJECTION, SOLUTION INTRAMUSCULAR; INTRAVENOUS
Status: DISCONTINUED | OUTPATIENT
Start: 2019-06-16 | End: 2019-06-16 | Stop reason: HOSPADM

## 2019-06-16 RX ADMIN — Medication 0.2 MG: at 09:33

## 2019-06-16 RX ADMIN — ONDANSETRON 4 MG: 2 INJECTION INTRAMUSCULAR; INTRAVENOUS at 06:43

## 2019-06-16 RX ADMIN — LIDOCAINE HYDROCHLORIDE 100 MG: 20 INJECTION, SOLUTION INTRAVENOUS at 09:39

## 2019-06-16 RX ADMIN — SODIUM CHLORIDE, PRESERVATIVE FREE 10 ML: 5 INJECTION INTRAVENOUS at 07:54

## 2019-06-16 RX ADMIN — FENTANYL CITRATE 25 MCG: 50 INJECTION INTRAMUSCULAR; INTRAVENOUS at 10:13

## 2019-06-16 RX ADMIN — FENTANYL CITRATE 50 MCG: 50 INJECTION INTRAMUSCULAR; INTRAVENOUS at 09:46

## 2019-06-16 RX ADMIN — DICYCLOMINE HYDROCHLORIDE 20 MG: 20 TABLET ORAL at 00:55

## 2019-06-16 RX ADMIN — MORPHINE SULFATE 2 MG: 4 INJECTION INTRAVENOUS at 22:19

## 2019-06-16 RX ADMIN — PROPOFOL 80 MG: 10 INJECTION, EMULSION INTRAVENOUS at 10:04

## 2019-06-16 RX ADMIN — DICYCLOMINE HYDROCHLORIDE 20 MG: 20 TABLET ORAL at 19:47

## 2019-06-16 RX ADMIN — PANTOPRAZOLE SODIUM 40 MG: 40 TABLET, DELAYED RELEASE ORAL at 17:01

## 2019-06-16 RX ADMIN — PROMETHAZINE HYDROCHLORIDE 12.5 MG: 25 INJECTION INTRAMUSCULAR; INTRAVENOUS at 14:29

## 2019-06-16 RX ADMIN — MORPHINE SULFATE 2 MG: 4 INJECTION INTRAVENOUS at 04:21

## 2019-06-16 RX ADMIN — FENTANYL CITRATE 50 MCG: 50 INJECTION INTRAMUSCULAR; INTRAVENOUS at 10:00

## 2019-06-16 RX ADMIN — SODIUM CHLORIDE: 9 INJECTION, SOLUTION INTRAVENOUS at 09:32

## 2019-06-16 RX ADMIN — DEXAMETHASONE SODIUM PHOSPHATE 4 MG: 4 INJECTION, SOLUTION INTRAMUSCULAR; INTRAVENOUS at 09:45

## 2019-06-16 RX ADMIN — ATENOLOL 25 MG: 25 TABLET ORAL at 14:28

## 2019-06-16 RX ADMIN — MORPHINE SULFATE 2 MG: 4 INJECTION INTRAVENOUS at 18:46

## 2019-06-16 RX ADMIN — VANCOMYCIN HYDROCHLORIDE 1750 MG: 5 INJECTION, POWDER, LYOPHILIZED, FOR SOLUTION INTRAVENOUS at 04:19

## 2019-06-16 RX ADMIN — PROPOFOL 50 MG: 10 INJECTION, EMULSION INTRAVENOUS at 10:48

## 2019-06-16 RX ADMIN — DICYCLOMINE HYDROCHLORIDE 20 MG: 20 TABLET ORAL at 14:28

## 2019-06-16 RX ADMIN — ESTRADIOL 0.5 MG: 1 TABLET ORAL at 14:28

## 2019-06-16 RX ADMIN — HYDROCODONE BITARTRATE AND ACETAMINOPHEN 1 TABLET: 7.5; 325 TABLET ORAL at 22:48

## 2019-06-16 RX ADMIN — CLONIDINE HYDROCHLORIDE 0.1 MG: 0.1 TABLET ORAL at 20:43

## 2019-06-16 RX ADMIN — PROMETHAZINE HYDROCHLORIDE 12.5 MG: 25 INJECTION INTRAMUSCULAR; INTRAVENOUS at 00:58

## 2019-06-16 RX ADMIN — SODIUM CHLORIDE, PRESERVATIVE FREE 10 ML: 5 INJECTION INTRAVENOUS at 20:44

## 2019-06-16 RX ADMIN — SODIUM CHLORIDE: 9 INJECTION, SOLUTION INTRAVENOUS at 12:11

## 2019-06-16 RX ADMIN — SODIUM CHLORIDE, PRESERVATIVE FREE 10 ML: 5 INJECTION INTRAVENOUS at 04:20

## 2019-06-16 RX ADMIN — ACETAMINOPHEN 650 MG: 325 TABLET ORAL at 14:52

## 2019-06-16 RX ADMIN — ONDANSETRON 4 MG: 2 INJECTION INTRAMUSCULAR; INTRAVENOUS at 10:37

## 2019-06-16 RX ADMIN — FLUTICASONE PROPIONATE 1 SPRAY: 50 SPRAY, METERED NASAL at 18:49

## 2019-06-16 RX ADMIN — PAROXETINE 30 MG: 10 TABLET, FILM COATED ORAL at 20:44

## 2019-06-16 RX ADMIN — MORPHINE SULFATE 2 MG: 4 INJECTION INTRAVENOUS at 14:23

## 2019-06-16 RX ADMIN — GABAPENTIN 800 MG: 400 CAPSULE ORAL at 20:44

## 2019-06-16 RX ADMIN — FENTANYL CITRATE 25 MCG: 50 INJECTION INTRAMUSCULAR; INTRAVENOUS at 10:04

## 2019-06-16 RX ADMIN — MORPHINE SULFATE 2 MG: 4 INJECTION INTRAVENOUS at 00:55

## 2019-06-16 RX ADMIN — MORPHINE SULFATE 2 MG: 4 INJECTION INTRAVENOUS at 06:43

## 2019-06-16 RX ADMIN — VANCOMYCIN HYDROCHLORIDE 1750 MG: 5 INJECTION, POWDER, LYOPHILIZED, FOR SOLUTION INTRAVENOUS at 17:00

## 2019-06-16 RX ADMIN — FENTANYL CITRATE 50 MCG: 50 INJECTION INTRAMUSCULAR; INTRAVENOUS at 09:38

## 2019-06-16 RX ADMIN — GABAPENTIN 800 MG: 400 CAPSULE ORAL at 14:28

## 2019-06-16 RX ADMIN — HYDROCODONE BITARTRATE AND ACETAMINOPHEN 1 TABLET: 7.5; 325 TABLET ORAL at 17:00

## 2019-06-16 RX ADMIN — PROPOFOL 120 MG: 10 INJECTION, EMULSION INTRAVENOUS at 09:39

## 2019-06-16 RX ADMIN — GUAIFENESIN 600 MG: 600 TABLET, EXTENDED RELEASE ORAL at 20:44

## 2019-06-16 RX ADMIN — ONDANSETRON 4 MG: 2 INJECTION INTRAMUSCULAR; INTRAVENOUS at 19:06

## 2019-06-16 ASSESSMENT — PULMONARY FUNCTION TESTS
PIF_VALUE: 13
PIF_VALUE: 11
PIF_VALUE: 13
PIF_VALUE: 13
PIF_VALUE: 12
PIF_VALUE: 9
PIF_VALUE: 1
PIF_VALUE: 14
PIF_VALUE: 13
PIF_VALUE: 1
PIF_VALUE: 13
PIF_VALUE: 14
PIF_VALUE: 13
PIF_VALUE: 13
PIF_VALUE: 12
PIF_VALUE: 13
PIF_VALUE: 0
PIF_VALUE: 1
PIF_VALUE: 13
PIF_VALUE: 13
PIF_VALUE: 6
PIF_VALUE: 13
PIF_VALUE: 0
PIF_VALUE: 13
PIF_VALUE: 11
PIF_VALUE: 0
PIF_VALUE: 14
PIF_VALUE: 12
PIF_VALUE: 13
PIF_VALUE: 13
PIF_VALUE: 14
PIF_VALUE: 9
PIF_VALUE: 13
PIF_VALUE: 9
PIF_VALUE: 8
PIF_VALUE: 13
PIF_VALUE: 11
PIF_VALUE: 13
PIF_VALUE: 14
PIF_VALUE: 12
PIF_VALUE: 13
PIF_VALUE: 13
PIF_VALUE: 14
PIF_VALUE: 14
PIF_VALUE: 13
PIF_VALUE: 2
PIF_VALUE: 11
PIF_VALUE: 13
PIF_VALUE: 8
PIF_VALUE: 13
PIF_VALUE: 0
PIF_VALUE: 5
PIF_VALUE: 13
PIF_VALUE: 12
PIF_VALUE: 13
PIF_VALUE: 9
PIF_VALUE: 13
PIF_VALUE: 10
PIF_VALUE: 14
PIF_VALUE: 13
PIF_VALUE: 14
PIF_VALUE: 13
PIF_VALUE: 9
PIF_VALUE: 13
PIF_VALUE: 13
PIF_VALUE: 0
PIF_VALUE: 7
PIF_VALUE: 9
PIF_VALUE: 11
PIF_VALUE: 12
PIF_VALUE: 13
PIF_VALUE: 1
PIF_VALUE: 13
PIF_VALUE: 9
PIF_VALUE: 14
PIF_VALUE: 1
PIF_VALUE: 13
PIF_VALUE: 9
PIF_VALUE: 12
PIF_VALUE: 11
PIF_VALUE: 11
PIF_VALUE: 13
PIF_VALUE: 14
PIF_VALUE: 13
PIF_VALUE: 11
PIF_VALUE: 14
PIF_VALUE: 0
PIF_VALUE: 6

## 2019-06-16 ASSESSMENT — ENCOUNTER SYMPTOMS
SHORTNESS OF BREATH: 1
ANAL BLEEDING: 0
BACK PAIN: 0
STRIDOR: 0
ABDOMINAL PAIN: 1
RECTAL PAIN: 0
EYE REDNESS: 0
COLOR CHANGE: 0
SORE THROAT: 0
EYE ITCHING: 0
PHOTOPHOBIA: 0
APNEA: 0
CHOKING: 0
CONSTIPATION: 0

## 2019-06-16 ASSESSMENT — PAIN DESCRIPTION - ORIENTATION
ORIENTATION: ANTERIOR
ORIENTATION: LEFT
ORIENTATION: ANTERIOR
ORIENTATION: LEFT
ORIENTATION: LEFT
ORIENTATION: ANTERIOR

## 2019-06-16 ASSESSMENT — PAIN SCALES - GENERAL
PAINLEVEL_OUTOF10: 6
PAINLEVEL_OUTOF10: 7
PAINLEVEL_OUTOF10: 6
PAINLEVEL_OUTOF10: 7
PAINLEVEL_OUTOF10: 0
PAINLEVEL_OUTOF10: 6
PAINLEVEL_OUTOF10: 7
PAINLEVEL_OUTOF10: 6
PAINLEVEL_OUTOF10: 7

## 2019-06-16 ASSESSMENT — PAIN DESCRIPTION - DESCRIPTORS
DESCRIPTORS: ACHING
DESCRIPTORS: ACHING
DESCRIPTORS: ACHING;DISCOMFORT
DESCRIPTORS: DISCOMFORT;ACHING
DESCRIPTORS: ACHING

## 2019-06-16 ASSESSMENT — PAIN DESCRIPTION - ONSET
ONSET: ON-GOING

## 2019-06-16 ASSESSMENT — PAIN DESCRIPTION - PROGRESSION
CLINICAL_PROGRESSION: GRADUALLY WORSENING
CLINICAL_PROGRESSION: NOT CHANGED

## 2019-06-16 ASSESSMENT — PAIN DESCRIPTION - DIRECTION
RADIATING_TOWARDS: LEG
RADIATING_TOWARDS: LEG

## 2019-06-16 ASSESSMENT — PAIN DESCRIPTION - LOCATION
LOCATION: ABDOMEN;HEAD
LOCATION: FOOT
LOCATION: FOOT
LOCATION: ABDOMEN;HEAD
LOCATION: GENERALIZED;FOOT
LOCATION: ABDOMEN;HEAD

## 2019-06-16 ASSESSMENT — PAIN DESCRIPTION - PAIN TYPE
TYPE: CHRONIC PAIN;ACUTE PAIN
TYPE: ACUTE PAIN
TYPE: SURGICAL PAIN
TYPE: SURGICAL PAIN
TYPE: ACUTE PAIN
TYPE: ACUTE PAIN

## 2019-06-16 ASSESSMENT — PAIN DESCRIPTION - FREQUENCY
FREQUENCY: CONTINUOUS
FREQUENCY: CONTINUOUS

## 2019-06-16 NOTE — ANESTHESIA PRE PROCEDURE
30 mL  30 mL Oral Daily PRN Kirstin Cook MD        [facility] injection 4 mg  4 mg Intravenous Q6H PRN Kirstin Cook MD   4 mg at 06/16/19 0643    enoxaparin (LOVENOX) injection 40 mg  40 mg Subcutaneous Daily Kirstin Cook MD   Stopped at 06/16/19 0753    acetaminophen (TYLENOL) tablet 650 mg  650 mg Oral Q4H PRN Kirstin Cook MD        vancomycin (VANCOCIN) 1,750 mg in dextrose 5 % 500 mL IVPB  1,750 mg Intravenous Q12H Kirstin Cook MD   Stopped at 06/16/19 0620    promethazine (PHENERGAN) injection 12.5 mg  12.5 mg Intramuscular Q6H PRN Amy Khan MD   12.5 mg at 06/16/19 0058    morphine sulfate (PF) injection 2 mg  2 mg Intravenous Q2H PRN Amy Khan MD   2 mg at 06/16/19 4566       Allergies:     Allergies   Allergen Reactions    Aspirin Palpitations     \"My Heart Rate Elevates\"    Compazine [Prochlorperazine Maleate] Rash    Reglan [Metoclopramide Hcl] Rash    Shellfish-Derived Products Swelling    Toradol [Ketorolac Tromethamine] Rash       Problem List:    Patient Active Problem List   Diagnosis Code    Abdominal pain R10.9    Rectal bleeding K62.5    Fever R50.9    Hematemesis K92.0    Fibromyalgia M79.7    Lupus [facility]systemic lupus erythematosus) ([facility]) M32.9    Nausea & vomiting R11.2    Chest pain R07.9    Chronic pancreatitis ([facility]) K86.1    HTN (hypertension) I10    Acute pancreatitis K85.90    Right-sided chest pain R07.9    Super obesity E66.9    Normocytic anemia D64.9    Hypokalemia E87.6    Generalized abdominal pain R10.84    Pneumonia of both lungs due to infectious organism J18.9    Foot ulcer, left ([facility]) L97.529    SLE (systemic lupus erythematosus related syndrome) ([facility]) M32.9    Hypoxia R09.02    Cellulitis L03.90    Pancytopenia ([facility]) D61.818    Pleurisy R09.1    Frequent UTI N39.0    Gastroesophageal reflux disease without esophagitis K21.9    MRSA cellulitis of left foot L03.116, B95.62    Depression F32.9    Fatty liver K76.0    Fatty liver disease, nonalcoholic E41.4    Arthritis M19.90    Bilateral low back pain with left-sided sciatica M54.42    Morbid obesity with BMI of 50.0-59.9, adult (HCC) E66.01, Z68.43    Intractable vomiting with nausea R11.2    Class 3 obesity in adult [id number]    Hiatal hernia K44.9    Poor intravenous access Z78.9    Post-operative nausea and vomiting R11.2, Z98.890    Status post bariatric surgery Z98.84    Status post laparoscopic sleeve gastrectomy Z98.84    Abscess L02.91    Dysphagia R13.10    Pseudoseizure F44.5    Chronic pain syndrome G89.4    Drug-seeking/Aberrant behavior Z76.5    Bacteremia R78.81       Past Medical History:        Diagnosis Date    Acid reflux     Anemia     Anxiety     Arthritis     Hands, Back And Ankles    Bleeding ulcer 2014    \"I Had Ulcers In My Stomach And Colon\"    Bronchitis Last Episode 2014    Chronic back pain     [facility]     [facility]    COPD (chronic obstructive pulmonary disease) ([facility]     Sees Dr. Barb Britton    Depression     Fibromyalgia Dx 2013    H/O echocardiogram 8/11/15    EF >55%. LA to be at the upper limit of normal in size. LV hypertrophy with normal LV systolic, but abnormal diastolic function. Normal valvular structures and function.  H/O echocardiogram 08/30/2018    EF 55-60%    Hiatal hernia     History of blood transfusion 09/2015 And 2018    No Reaction To Blood Transfusions Received    [place] (hard of hearing)     Right Ear    Hx of cardiac catheterization 10/24/2010    Angiographically normal coronary arteries w/ normal LV function and wall motion.  Hx of transesophageal echocardiography (PILO) for monitoring 12/2/2010    EF 55-60%. WNL.     Hypertension     Lupus ([facility] [address] Dx 2013    \"Rheumatoid Lupus\"    Morbid obesity ([facility] [address]     Nausea     \"Most Of The Time\"    Pain management     Sees Dr. Jennifer Daniels, Once A Month    Pancreatitis chronic Dx 2001    Pneumonia Dx 11-15    Answered      Vital Signs (Current):   Vitals:    06/15/19 1953 06/16/19 0046 06/16/19 0425 06/16/19 0749   BP: (!) 154/78 112/65 120/68 112/79   Pulse: 75 67 74 71   Resp: 18 18 27 16   Temp: 98.8 °F (37.1 °C) 98 °F (36.7 °C) 98.9 °F (37.2 °C) 98.4 °F (36.9 °C)   TempSrc: Oral Oral Oral Oral   SpO2:  98% 92%    Weight:   269 lb 10 oz (122.3 kg)    Height:                                                  BP Readings from Last 3 Encounters:   06/16/19 112/79   06/13/19 118/70   04/29/19 (!) 109/55       NPO Status:                                                                                 BMI:   Wt Readings from Last 3 Encounters:   06/16/19 269 lb 10 oz (122.3 kg)   06/12/19 271 lb (122.9 kg)   04/29/19 271 lb (122.9 kg)     Body mass index is 46.28 kg/m².     CBC:   Lab Results   Component Value Date    WBC 5.7 06/16/2019    RBC 3.75 06/16/2019    HGB 9.1 06/16/2019    HCT 30.8 06/16/2019    MCV 82.1 06/16/2019    RDW 14.4 06/16/2019     06/16/2019       CMP:   Lab Results   Component Value Date     06/16/2019    K 4.0 06/16/2019     06/16/2019    CO2 22 06/16/2019    BUN 8 06/16/2019    CREATININE 0.7 06/16/2019    GFRAA >60 06/16/2019    LABGLOM >60 06/16/2019    GLUCOSE 113 06/16/2019    PROT 6.1 06/14/2019    PROT 6.5 11/18/2011    CALCIUM 9.4 06/16/2019    BILITOT 0.2 06/14/2019    ALKPHOS 137 06/14/2019    AST 21 06/14/2019    ALT 20 06/14/2019       POC Tests:   Recent Labs     06/15/19  1706   POCGLU 117*       Coags:   Lab Results   Component Value Date    PROTIME 11.3 03/20/2019    PROTIME 11.4 12/01/2010    INR 0.99 03/20/2019    APTT 26.4 03/20/2019       HCG (If Applicable):   Lab Results   Component Value Date    PREGTESTUR NEGATIVE 07/10/2017        ABGs: No results found for: PHART, PO2ART, MAK1VAH, DIQ8VWI, BEART, S1REHYVQ     Type & Screen (If Applicable):  No results found for: LABABO, LABRH    Anesthesia Evaluation    Airway: Mallampati: II  TM distance: >3 FB   Neck ROM: full  Mouth opening: > = 3 FB Dental:          Pulmonary:normal exam    (+) pneumonia:  COPD:  shortness of breath:  sleep apnea:                             Cardiovascular:    (+) hypertension:, PARSONS:,                   Neuro/Psych:   (+) neuromuscular disease:, psychiatric history:            GI/Hepatic/Renal:             Endo/Other:                     Abdominal:           Vascular:                                        Anesthesia Plan      general     ASA 3       Induction: intravenous. MIPS: Postoperative opioids intended. Anesthetic plan and risks discussed with patient. Plan discussed with CRNA.     Attending anesthesiologist reviewed and agrees with Bree Cummings MD   6/16/2019

## 2019-06-16 NOTE — H&P
..H&P Update    Patient's History and Physical from Seda 15, 2019 was reviewed. Patient examined. There has been no change.     Gema Laser

## 2019-06-16 NOTE — BRIEF OP NOTE
Brief Postoperative Note  ______________________________________________________________    Patient: Lucas Sanders  YOB: 1970  MRN: [id number]  Date of Procedure: 6/16/2019    Pre-Op Diagnosis: INFECTED ABSCESS    Post-Op Diagnosis: Same       Procedure(s):  FIRST METATARSAL DEBRIDEMENT INCISION AND DRAINAGE. EXCISION OF ULCER. RESECTION OF BONE. 1ST METATARSAL POWER LAVAGE AND BONE CEMENT    Anesthesia: General    Surgeon(s):  Cynthia Bolivar DPM    Assistant: none    Estimated Blood Loss (mL): 5 ml    Complications: None    Specimens:   ID Type Source Tests Collected by Time Destination   A : FIRST METATARSAL BONE Bone Bone SURGICAL PATHOLOGY Cynthia Bolivar DPM 6/16/2019 1029        Implants:  Implant Name Type Inv.  Item Serial No.  Lot No. LRB No. Used   GRAFT SUBST RESORBABLE MINI 5CC FAST SET Bone/Graft/Tissue/Human/Synth GRAFT SUBST RESORBABLE MINI 5CC FAST SET  [facility] [employer] [facility] [id number] Left 1         Drains: * No LDAs found *    Findings: as expected    Cynthia Bolivar DPM  Date: 6/16/2019  Time: 11:08 AM

## 2019-06-16 NOTE — CONSULTS
[address] [place] [address], [address] [facility]                                  CONSULTATION    PATIENT NAME: Masood Vuong                  :        1970  MED REC NO:   [id number]                          ROOM:         ACCOUNT NO:   [de-identified]                           ADMIT DATE: 2019  PROVIDER:     Aric Dent DPM    CONSULT DATE:  06/15/2019    HISTORY OF PRESENT ILLNESS:  The patient is a pleasant 70-year-old  female who is well known to the office and was admitted for a left  possible foot infection once again. I got the call from them last night  about her being admitted. We had already previously had a plan to go on  an outpatient basis and do a debridement of this first metatarsal on the  left and then pack it with antibiotic-laced beads and it looks like it  is _____ our hand a little bit. The patient has been bouncing a couple  times to the ER and get a dose of vancomycin and on oral antibiotics and  is just not doing what we needed to do at this point. The patient was  admitted. The patient states that her foot is painful. They did get  the cultures back, they are going to repeat the blood culture because of  a positive as well, and I see that there is some uncertainty about  whether it is coming from the left foot wound versus her left chest  port. The patient was seen at bedside, seemed to be doing well today. No nausea, vomiting, fever, or chills. PHYSICAL EXAMINATION:  VITAL SIGNS:  At this point show her temperature is 98.5, respirations  16, pulse 53, blood pressure 123/66. EXTREMITIES:  Show that she has palpable pulses capsule. Capillary  refill time less than five seconds. Skin temperature within normal  limits. DERMATOLOGIC:  The patient does have a little bit of redness and  puffiness around that first metatarsal base medial on the left.   There  is no fluctuance or drainage noted to

## 2019-06-16 NOTE — PROGRESS NOTES
General Surgery-Dr [facility] Day: 3    ChiefComplaint on Admission: left foot and generalized abdominal pain      Subjective:     Lucas Sanders is a 46 y.o. female with vague abdominal pain. Pt is post op from I & D left foot by podiatry . Patient reports abdominal pain. Tolerating Diet NPO, After Midnight. + BM.     ROS:  Review of Systems   Constitutional: Negative for chills and fever. HENT: Negative for ear pain, mouth sores, sore throat and tinnitus. Eyes: Negative for photophobia, redness and itching. Respiratory: Negative for apnea, choking and stridor. Cardiovascular: Negative for chest pain and palpitations. Gastrointestinal: Positive for abdominal pain. Negative for anal bleeding, constipation and rectal pain. Endocrine: Negative for polydipsia. Genitourinary: Negative for enuresis, flank pain and hematuria. Musculoskeletal: Positive for gait problem and joint swelling. Negative for back pain and myalgias. Skin: Negative for color change and pallor. Allergic/Immunologic: Negative for environmental allergies. Neurological: Negative for syncope and speech difficulty. Psychiatric/Behavioral: Negative for confusion and hallucinations. Allergies  Aspirin; Compazine [prochlorperazine maleate]; Reglan [metoclopramide hcl]; Shellfish-derived products; and Toradol [ketorolac tromethamine]          Diagnosis Date    Acid reflux     Anemia     Anxiety     Arthritis     Hands, Back And Ankles    Bleeding ulcer 2014    \"I Had Ulcers In My Stomach And Colon\"    Bronchitis Last Episode 2014    Chronic back pain     Chronic pain     Sees Dr. Victoria Vallejo COPD (chronic obstructive pulmonary disease) ([facility] [address]     Sees Dr. Ruelas Oj Depression     Fibromyalgia Dx 2013    H/O echocardiogram 8/11/15    EF >55%. LA to be at the upper limit of normal in size. LV hypertrophy with normal LV systolic, but abnormal diastolic function.  Normal valvular

## 2019-06-16 NOTE — ANESTHESIA POSTPROCEDURE EVALUATION
Department of Anesthesiology  Postprocedure Note    Patient: Ashley Saldivar  MRN: [id number]  YOB: 1970  Date of evaluation: 6/16/2019  Time:  11:15 AM     Procedure Summary     Date:  06/16/19 Room / Location:  [place] [address] / [facility] OR    Anesthesia Start:  0933 Anesthesia Stop:  0432    Procedure:  FIRST METATARSAL DEBRIDEMENT INCISION AND DRAINAGE. EXCISION OF ULCER. RESECTION OF BONE. 1ST METATARSAL POWER LAVAGE AND BONE CEMENT (Left ) Diagnosis:  (INFECTED ABSCESS)    Surgeon:  Mac Whittaker DPM Responsible Provider:  Jayden Vargas MD    Anesthesia Type:  general ASA Status:  3          Anesthesia Type: general    Anay Phase I:      Anay Phase II:      Last vitals: Reviewed and per EMR flowsheets.        Anesthesia Post Evaluation    Patient location during evaluation: PACU  Patient participation: complete - patient participated  Level of consciousness: awake  Pain score: 3  Airway patency: patent  Nausea & Vomiting: no vomiting and no nausea  Complications: no  Cardiovascular status: blood pressure returned to baseline and hemodynamically stable  Respiratory status: acceptable  Hydration status: stable

## 2019-06-17 LAB — FERRITIN: 36 NG/ML (ref 15–150)

## 2019-06-17 PROCEDURE — 6370000000 HC RX 637 (ALT 250 FOR IP): Performed by: FAMILY MEDICINE

## 2019-06-17 PROCEDURE — 6360000002 HC RX W HCPCS: Performed by: PODIATRIST

## 2019-06-17 PROCEDURE — 6360000002 HC RX W HCPCS: Performed by: FAMILY MEDICINE

## 2019-06-17 PROCEDURE — 2580000003 HC RX 258: Performed by: FAMILY MEDICINE

## 2019-06-17 PROCEDURE — 83550 IRON BINDING TEST: CPT

## 2019-06-17 PROCEDURE — 6370000000 HC RX 637 (ALT 250 FOR IP): Performed by: PODIATRIST

## 2019-06-17 PROCEDURE — 99232 SBSQ HOSP IP/OBS MODERATE 35: CPT | Performed by: SURGERY

## 2019-06-17 PROCEDURE — 36415 COLL VENOUS BLD VENIPUNCTURE: CPT

## 2019-06-17 PROCEDURE — 1200000000 HC SEMI PRIVATE

## 2019-06-17 PROCEDURE — 82728 ASSAY OF FERRITIN: CPT

## 2019-06-17 PROCEDURE — 94761 N-INVAS EAR/PLS OXIMETRY MLT: CPT

## 2019-06-17 PROCEDURE — 83540 ASSAY OF IRON: CPT

## 2019-06-17 RX ORDER — ZOLPIDEM TARTRATE 5 MG/1
5 TABLET ORAL NIGHTLY PRN
Status: DISCONTINUED | OUTPATIENT
Start: 2019-06-17 | End: 2019-06-26 | Stop reason: HOSPADM

## 2019-06-17 RX ORDER — HYDROCODONE BITARTRATE AND ACETAMINOPHEN 7.5; 325 MG/1; MG/1
1 TABLET ORAL EVERY 4 HOURS PRN
Status: DISCONTINUED | OUTPATIENT
Start: 2019-06-17 | End: 2019-06-22

## 2019-06-17 RX ORDER — MORPHINE SULFATE 4 MG/ML
4 INJECTION, SOLUTION INTRAMUSCULAR; INTRAVENOUS EVERY 4 HOURS PRN
Status: DISCONTINUED | OUTPATIENT
Start: 2019-06-17 | End: 2019-06-22

## 2019-06-17 RX ORDER — MORPHINE SULFATE 4 MG/ML
2 INJECTION, SOLUTION INTRAMUSCULAR; INTRAVENOUS EVERY 4 HOURS PRN
Status: DISCONTINUED | OUTPATIENT
Start: 2019-06-17 | End: 2019-06-22

## 2019-06-17 RX ADMIN — PANTOPRAZOLE SODIUM 40 MG: 40 TABLET, DELAYED RELEASE ORAL at 08:27

## 2019-06-17 RX ADMIN — HYDROCODONE BITARTRATE AND ACETAMINOPHEN 1 TABLET: 7.5; 325 TABLET ORAL at 21:20

## 2019-06-17 RX ADMIN — HYDROCODONE BITARTRATE AND ACETAMINOPHEN 1 TABLET: 7.5; 325 TABLET ORAL at 12:33

## 2019-06-17 RX ADMIN — DICYCLOMINE HYDROCHLORIDE 20 MG: 20 TABLET ORAL at 13:15

## 2019-06-17 RX ADMIN — ESTRADIOL 0.5 MG: 1 TABLET ORAL at 08:27

## 2019-06-17 RX ADMIN — SODIUM CHLORIDE, PRESERVATIVE FREE 10 ML: 5 INJECTION INTRAVENOUS at 20:45

## 2019-06-17 RX ADMIN — ZOLPIDEM TARTRATE 5 MG: 5 TABLET ORAL at 20:44

## 2019-06-17 RX ADMIN — GABAPENTIN 800 MG: 400 CAPSULE ORAL at 20:45

## 2019-06-17 RX ADMIN — ENOXAPARIN SODIUM 40 MG: 40 INJECTION SUBCUTANEOUS at 08:30

## 2019-06-17 RX ADMIN — ONDANSETRON 4 MG: 2 INJECTION INTRAMUSCULAR; INTRAVENOUS at 23:31

## 2019-06-17 RX ADMIN — MORPHINE SULFATE 4 MG: 4 INJECTION INTRAVENOUS at 13:59

## 2019-06-17 RX ADMIN — SODIUM CHLORIDE, PRESERVATIVE FREE 10 ML: 5 INJECTION INTRAVENOUS at 23:32

## 2019-06-17 RX ADMIN — MORPHINE SULFATE 4 MG: 4 INJECTION INTRAVENOUS at 01:21

## 2019-06-17 RX ADMIN — PANTOPRAZOLE SODIUM 40 MG: 40 TABLET, DELAYED RELEASE ORAL at 17:00

## 2019-06-17 RX ADMIN — CLONIDINE HYDROCHLORIDE 0.1 MG: 0.1 TABLET ORAL at 08:15

## 2019-06-17 RX ADMIN — GUAIFENESIN 600 MG: 600 TABLET, EXTENDED RELEASE ORAL at 08:15

## 2019-06-17 RX ADMIN — MORPHINE SULFATE 4 MG: 4 INJECTION INTRAVENOUS at 09:39

## 2019-06-17 RX ADMIN — MORPHINE SULFATE 4 MG: 4 INJECTION INTRAVENOUS at 23:32

## 2019-06-17 RX ADMIN — VANCOMYCIN HYDROCHLORIDE 1750 MG: 5 INJECTION, POWDER, LYOPHILIZED, FOR SOLUTION INTRAVENOUS at 03:49

## 2019-06-17 RX ADMIN — ONDANSETRON 4 MG: 2 INJECTION INTRAMUSCULAR; INTRAVENOUS at 13:59

## 2019-06-17 RX ADMIN — FLUTICASONE PROPIONATE 1 SPRAY: 50 SPRAY, METERED NASAL at 08:27

## 2019-06-17 RX ADMIN — MORPHINE SULFATE 4 MG: 4 INJECTION INTRAVENOUS at 18:37

## 2019-06-17 RX ADMIN — SODIUM CHLORIDE, PRESERVATIVE FREE 10 ML: 5 INJECTION INTRAVENOUS at 08:16

## 2019-06-17 RX ADMIN — GABAPENTIN 800 MG: 400 CAPSULE ORAL at 08:15

## 2019-06-17 RX ADMIN — VANCOMYCIN HYDROCHLORIDE 1750 MG: 5 INJECTION, POWDER, LYOPHILIZED, FOR SOLUTION INTRAVENOUS at 17:00

## 2019-06-17 RX ADMIN — HYDROCODONE BITARTRATE AND ACETAMINOPHEN 1 TABLET: 7.5; 325 TABLET ORAL at 08:15

## 2019-06-17 RX ADMIN — PAROXETINE 30 MG: 10 TABLET, FILM COATED ORAL at 20:44

## 2019-06-17 RX ADMIN — HYDROCODONE BITARTRATE AND ACETAMINOPHEN 1 TABLET: 7.5; 325 TABLET ORAL at 17:01

## 2019-06-17 RX ADMIN — DICYCLOMINE HYDROCHLORIDE 20 MG: 20 TABLET ORAL at 01:21

## 2019-06-17 RX ADMIN — GUAIFENESIN 600 MG: 600 TABLET, EXTENDED RELEASE ORAL at 20:45

## 2019-06-17 RX ADMIN — DICYCLOMINE HYDROCHLORIDE 20 MG: 20 TABLET ORAL at 18:37

## 2019-06-17 RX ADMIN — MORPHINE SULFATE 4 MG: 4 INJECTION INTRAVENOUS at 05:28

## 2019-06-17 RX ADMIN — DICYCLOMINE HYDROCHLORIDE 20 MG: 20 TABLET ORAL at 08:27

## 2019-06-17 RX ADMIN — GABAPENTIN 800 MG: 400 CAPSULE ORAL at 13:15

## 2019-06-17 RX ADMIN — LEVOTHYROXINE SODIUM 125 MCG: 125 TABLET ORAL at 08:27

## 2019-06-17 RX ADMIN — HYDROCODONE BITARTRATE AND ACETAMINOPHEN 1 TABLET: 7.5; 325 TABLET ORAL at 03:53

## 2019-06-17 ASSESSMENT — PAIN DESCRIPTION - LOCATION
LOCATION: FOOT

## 2019-06-17 ASSESSMENT — PAIN DESCRIPTION - DIRECTION
RADIATING_TOWARDS: UP LEG
RADIATING_TOWARDS: UP THE LEG
RADIATING_TOWARDS: UP LEG
RADIATING_TOWARDS: LEG
RADIATING_TOWARDS: UP LEG

## 2019-06-17 ASSESSMENT — PAIN SCALES - GENERAL
PAINLEVEL_OUTOF10: 6
PAINLEVEL_OUTOF10: 7
PAINLEVEL_OUTOF10: 8
PAINLEVEL_OUTOF10: 7
PAINLEVEL_OUTOF10: 6
PAINLEVEL_OUTOF10: 8
PAINLEVEL_OUTOF10: 7
PAINLEVEL_OUTOF10: 8
PAINLEVEL_OUTOF10: 9
PAINLEVEL_OUTOF10: 8
PAINLEVEL_OUTOF10: 8

## 2019-06-17 ASSESSMENT — PAIN DESCRIPTION - PAIN TYPE
TYPE: SURGICAL PAIN

## 2019-06-17 ASSESSMENT — PAIN DESCRIPTION - PROGRESSION
CLINICAL_PROGRESSION: NOT CHANGED
CLINICAL_PROGRESSION: OTHER (COMMENT)
CLINICAL_PROGRESSION: GRADUALLY WORSENING
CLINICAL_PROGRESSION: NOT CHANGED
CLINICAL_PROGRESSION: GRADUALLY WORSENING

## 2019-06-17 ASSESSMENT — PAIN DESCRIPTION - ONSET
ONSET: ON-GOING
ONSET: GRADUAL
ONSET: ON-GOING
ONSET: GRADUAL

## 2019-06-17 ASSESSMENT — PAIN DESCRIPTION - FREQUENCY
FREQUENCY: CONTINUOUS
FREQUENCY: INTERMITTENT
FREQUENCY: CONTINUOUS
FREQUENCY: INTERMITTENT

## 2019-06-17 ASSESSMENT — PAIN DESCRIPTION - DESCRIPTORS
DESCRIPTORS: BURNING
DESCRIPTORS: BURNING;DISCOMFORT;CONSTANT
DESCRIPTORS: BURNING
DESCRIPTORS: BURNING;CONSTANT;DISCOMFORT
DESCRIPTORS: ACHING;DISCOMFORT
DESCRIPTORS: ACHING;BURNING
DESCRIPTORS: ACHING;DISCOMFORT
DESCRIPTORS: BURNING;CONSTANT;DISCOMFORT
DESCRIPTORS: BURNING;DISCOMFORT;CONSTANT
DESCRIPTORS: BURNING

## 2019-06-17 ASSESSMENT — PAIN DESCRIPTION - ORIENTATION
ORIENTATION: LEFT;MID;LOWER
ORIENTATION: LEFT;MID;LOWER
ORIENTATION: LEFT
ORIENTATION: LEFT
ORIENTATION: LEFT;MID;LOWER
ORIENTATION: LEFT;MID;LOWER
ORIENTATION: LEFT
ORIENTATION: LEFT;LOWER;MID
ORIENTATION: LEFT;MID;LOWER
ORIENTATION: LEFT
ORIENTATION: LEFT

## 2019-06-17 ASSESSMENT — PAIN - FUNCTIONAL ASSESSMENT
PAIN_FUNCTIONAL_ASSESSMENT: ACTIVITIES ARE NOT PREVENTED

## 2019-06-17 NOTE — PROGRESS NOTES
Pt stand and rotate to bedside commode. Pt requesting pain meds and Zofran. Pt stated that she just wishes she could sleep. Pt asking for dr to give her something at night to help her sleep.  Message being sent to

## 2019-06-17 NOTE — PLAN OF CARE
Problem: Pain:  Description  Pain management should include both nonpharmacologic and pharmacologic interventions.   Goal: Pain level will decrease  Description  Pain level will decrease  Outcome: Ongoing  Goal: Control of acute pain  Description  Control of acute pain  Outcome: Ongoing  Goal: Control of chronic pain  Description  Control of chronic pain  Outcome: Ongoing     Problem: Nausea/Vomiting:  Goal: Absence of nausea/vomiting  Description  Absence of nausea/vomiting  Outcome: Ongoing  Goal: Able to drink  Description  Able to drink  Outcome: Ongoing  Goal: Able to eat  Description  Able to eat  Outcome: Ongoing  Goal: Ability to achieve adequate nutritional intake will improve  Description  Ability to achieve adequate nutritional intake will improve  Outcome: Ongoing

## 2019-06-17 NOTE — PROGRESS NOTES
GENERAL SURGERY PROGRESS NOTE    CC/HPI:           Patient feels better from her lap sleeve gastrectomy surgery, and loosing adequate weight. .. Vitals:    06/17/19 0350 06/17/19 0720 06/17/19 0817 06/17/19 0822   BP: 116/69 107/73     Pulse: 60 58     Resp: 20 22     Temp: 97.9 °F (36.6 °C) 97.9 °F (36.6 °C)     TempSrc: Oral Oral     SpO2:  94%  97%   Weight:   285 lb 4.4 oz (129.4 kg)    Height:         I/O last 3 completed shifts:   In: 1000 [I.V.:500; IV Piggyback:500]  Out: 10 [Blood:10]  I/O this shift:  In: 240 [P.O.:240]  Out: -     DIET GENERAL;    Recent Results (from the past 48 hour(s))   POCT Glucose    Collection Time: 06/15/19  5:06 PM   Result Value Ref Range    POC Glucose 117 (H) 70 - 99 MG/DL   Lactic acid, plasma    Collection Time: 06/15/19  6:12 PM   Result Value Ref Range    Lactate 1.2 0.4 - 2.0 mMOL/L   Basic metabolic panel    Collection Time: 06/15/19  6:12 PM   Result Value Ref Range    Sodium 138 135 - 145 MMOL/L    Potassium 4.6 3.5 - 5.1 MMOL/L    Chloride 107 99 - 110 mMol/L    CO2 18 (L) 21 - 32 MMOL/L    Anion Gap 13 4 - 16    BUN 10 6 - 23 MG/DL    CREATININE 0.7 0.6 - 1.1 MG/DL    Glucose 104 (H) 70 - 99 MG/DL    Calcium 10.1 8.3 - 10.6 MG/DL    GFR Non-African American >60 >60 mL/min/1.73m2    GFR African American >60 >60 RN/LPW/4.57U1   Basic Metabolic Panel w/ Reflex to MG    Collection Time: 06/16/19  3:27 AM   Result Value Ref Range    Sodium 139 135 - 145 MMOL/L    Potassium 4.0 3.5 - 5.1 MMOL/L    Chloride 107 99 - 110 mMol/L    CO2 22 21 - 32 MMOL/L    Anion Gap 10 4 - 16    BUN 8 6 - 23 MG/DL    CREATININE 0.7 0.6 - 1.1 MG/DL    Glucose 113 (H) 70 - 99 MG/DL    Calcium 9.4 8.3 - 10.6 MG/DL    GFR Non-African American >60 >60 mL/min/1.73m2    GFR African American >60 >60 mL/min/1.73m2   CBC auto differential    Collection Time: 06/16/19  3:27 AM   Result Value Ref Range    WBC 5.7 4.0 - 10.5 K/CU MM    RBC 3.75 (L) 4.2 - 5.4 M/CU MM    Hemoglobin 9.1 (L) 12.5 Rales.  Extremities: No edema. Neuro: Alert and Oriented x 3, Non focal.  Abdomen: Soft, Benign, Non tender, Non distended, Positive bowel sounds. Incision: Nicely healing: No erythema, No discharge. Active Problems:    Bacteremia  Resolved Problems:    * No resolved hospital problems. *    April 2, 2019     PRE-OP DIAGNOSIS: ? Hematemesis? 1.5 months s/p lap sleeve gastrectomy. POST-OP DIAGNOSIS: Same + Per the EGD performed all the way to the 3rd portion of the duodenum:  - Z-line was @ 40 cm from the superior incisors' level  - NO GERD stigmata noted, no obvious Hiatal Hernia, no changes suspicious Marsh's  - Positive gastritis - and a bleeding area in the distal staple line was noted. - No biliary reflux     PROCEDURE(S):   - EGD to the 3rd portion of the duodenum  - Epinephrine injection 10 ml CONTROLLED the bleeding mucosa. Assessment and Plan:  Kimmie Case is a 46 y.o. female with left foot pain:    POD#1:  Procedure:  FIRST METATARSAL DEBRIDEMENT INCISION AND DRAINAGE. EXCISION OF ULCER. RESECTION OF BONE. 1ST METATARSAL POWER LAVAGE AND BONE CEMENT (Left ) Diagnosis:  (INFECTED ABSCESS)     Surgeon:  Dwight Longoria DPM     From the bariatric standpoint, doing fairly well, and no symptoms to worry about her previous gastritis, will follow. Increase Ambulation to at least 4x/day walk in the hallways with assistance. Respiratory cha-operative care: Incentive Spirometry / deep breathing and coughing 10x/hr while awake. Continue DVT prophylaxis with Teds and SCDs and SC Lovenox. ___________________________________________    Amanda Taylor MD, FACS, FICS  6/17/2019  10:26 AM    Patient was seen with total face to face time of 25 minutes. More than 50% of this visit was counseling and education as above in my assessment and plan section of my note.

## 2019-06-18 ENCOUNTER — ANESTHESIA EVENT (OUTPATIENT)
Dept: ENDOSCOPY | Age: 49
DRG: 501 | End: 2019-06-18
Payer: COMMERCIAL

## 2019-06-18 PROBLEM — H10.30 ACUTE CONJUNCTIVITIS: Status: ACTIVE | Noted: 2019-06-18

## 2019-06-18 LAB
CULTURE: ABNORMAL
CULTURE: ABNORMAL
HCT VFR BLD CALC: 29.6 % (ref 37–47)
HEMOGLOBIN: 8.7 GM/DL (ref 12.5–16)
Lab: ABNORMAL
MCH RBC QN AUTO: 24.4 PG (ref 27–31)
MCHC RBC AUTO-ENTMCNC: 29.4 % (ref 32–36)
MCV RBC AUTO: 82.9 FL (ref 78–100)
PDW BLD-RTO: 14.3 % (ref 11.7–14.9)
PLATELET # BLD: 194 K/CU MM (ref 140–440)
PMV BLD AUTO: 10.5 FL (ref 7.5–11.1)
RBC # BLD: 3.57 M/CU MM (ref 4.2–5.4)
SPECIMEN: ABNORMAL
WBC # BLD: 4.3 K/CU MM (ref 4–10.5)

## 2019-06-18 PROCEDURE — 99232 SBSQ HOSP IP/OBS MODERATE 35: CPT | Performed by: SURGERY

## 2019-06-18 PROCEDURE — 6370000000 HC RX 637 (ALT 250 FOR IP): Performed by: FAMILY MEDICINE

## 2019-06-18 PROCEDURE — 6360000002 HC RX W HCPCS: Performed by: FAMILY MEDICINE

## 2019-06-18 PROCEDURE — 6370000000 HC RX 637 (ALT 250 FOR IP): Performed by: PODIATRIST

## 2019-06-18 PROCEDURE — 2580000003 HC RX 258: Performed by: FAMILY MEDICINE

## 2019-06-18 PROCEDURE — 6360000002 HC RX W HCPCS: Performed by: PODIATRIST

## 2019-06-18 PROCEDURE — 85027 COMPLETE CBC AUTOMATED: CPT

## 2019-06-18 PROCEDURE — 6360000002 HC RX W HCPCS: Performed by: SURGERY

## 2019-06-18 PROCEDURE — 1200000000 HC SEMI PRIVATE

## 2019-06-18 RX ORDER — ONDANSETRON 2 MG/ML
8 INJECTION INTRAMUSCULAR; INTRAVENOUS EVERY 8 HOURS PRN
Status: DISCONTINUED | OUTPATIENT
Start: 2019-06-18 | End: 2019-06-26 | Stop reason: HOSPADM

## 2019-06-18 RX ORDER — CIPROFLOXACIN HYDROCHLORIDE 3.5 MG/ML
1 SOLUTION/ DROPS TOPICAL
Status: DISCONTINUED | OUTPATIENT
Start: 2019-06-18 | End: 2019-06-26 | Stop reason: HOSPADM

## 2019-06-18 RX ORDER — PROMETHAZINE HYDROCHLORIDE 25 MG/ML
6.25 INJECTION, SOLUTION INTRAMUSCULAR; INTRAVENOUS EVERY 6 HOURS PRN
Status: DISCONTINUED | OUTPATIENT
Start: 2019-06-18 | End: 2019-06-26 | Stop reason: HOSPADM

## 2019-06-18 RX ADMIN — DICYCLOMINE HYDROCHLORIDE 20 MG: 20 TABLET ORAL at 01:58

## 2019-06-18 RX ADMIN — CLONIDINE HYDROCHLORIDE 0.1 MG: 0.1 TABLET ORAL at 20:47

## 2019-06-18 RX ADMIN — MORPHINE SULFATE 4 MG: 4 INJECTION INTRAVENOUS at 03:37

## 2019-06-18 RX ADMIN — CIPROFLOXACIN HYDROCHLORIDE 1 DROP: 3 SOLUTION/ DROPS OPHTHALMIC at 18:26

## 2019-06-18 RX ADMIN — DICYCLOMINE HYDROCHLORIDE 20 MG: 20 TABLET ORAL at 13:35

## 2019-06-18 RX ADMIN — MORPHINE SULFATE 4 MG: 4 INJECTION INTRAVENOUS at 13:36

## 2019-06-18 RX ADMIN — HYDROCODONE BITARTRATE AND ACETAMINOPHEN 1 TABLET: 7.5; 325 TABLET ORAL at 05:41

## 2019-06-18 RX ADMIN — FLUTICASONE PROPIONATE 1 SPRAY: 50 SPRAY, METERED NASAL at 08:25

## 2019-06-18 RX ADMIN — PAROXETINE 30 MG: 10 TABLET, FILM COATED ORAL at 20:46

## 2019-06-18 RX ADMIN — GABAPENTIN 800 MG: 400 CAPSULE ORAL at 13:35

## 2019-06-18 RX ADMIN — GUAIFENESIN 600 MG: 600 TABLET, EXTENDED RELEASE ORAL at 20:48

## 2019-06-18 RX ADMIN — SODIUM CHLORIDE, PRESERVATIVE FREE 10 ML: 5 INJECTION INTRAVENOUS at 20:48

## 2019-06-18 RX ADMIN — CIPROFLOXACIN HYDROCHLORIDE 1 DROP: 3 SOLUTION/ DROPS OPHTHALMIC at 16:04

## 2019-06-18 RX ADMIN — SODIUM CHLORIDE, PRESERVATIVE FREE 10 ML: 5 INJECTION INTRAVENOUS at 08:15

## 2019-06-18 RX ADMIN — ONDANSETRON 8 MG: 2 INJECTION INTRAMUSCULAR; INTRAVENOUS at 17:16

## 2019-06-18 RX ADMIN — GABAPENTIN 800 MG: 400 CAPSULE ORAL at 08:13

## 2019-06-18 RX ADMIN — GABAPENTIN 800 MG: 400 CAPSULE ORAL at 20:47

## 2019-06-18 RX ADMIN — ATENOLOL 25 MG: 25 TABLET ORAL at 08:14

## 2019-06-18 RX ADMIN — MORPHINE SULFATE 4 MG: 4 INJECTION INTRAVENOUS at 18:23

## 2019-06-18 RX ADMIN — VANCOMYCIN HYDROCHLORIDE 1750 MG: 5 INJECTION, POWDER, LYOPHILIZED, FOR SOLUTION INTRAVENOUS at 17:16

## 2019-06-18 RX ADMIN — VANCOMYCIN HYDROCHLORIDE 1750 MG: 5 INJECTION, POWDER, LYOPHILIZED, FOR SOLUTION INTRAVENOUS at 03:36

## 2019-06-18 RX ADMIN — ONDANSETRON 4 MG: 2 INJECTION INTRAMUSCULAR; INTRAVENOUS at 05:40

## 2019-06-18 RX ADMIN — CIPROFLOXACIN HYDROCHLORIDE 1 DROP: 3 SOLUTION/ DROPS OPHTHALMIC at 21:02

## 2019-06-18 RX ADMIN — PANTOPRAZOLE SODIUM 40 MG: 40 TABLET, DELAYED RELEASE ORAL at 05:51

## 2019-06-18 RX ADMIN — ENOXAPARIN SODIUM 40 MG: 40 INJECTION SUBCUTANEOUS at 08:15

## 2019-06-18 RX ADMIN — PANTOPRAZOLE SODIUM 40 MG: 40 TABLET, DELAYED RELEASE ORAL at 18:23

## 2019-06-18 RX ADMIN — HYDROCODONE BITARTRATE AND ACETAMINOPHEN 1 TABLET: 7.5; 325 TABLET ORAL at 16:04

## 2019-06-18 RX ADMIN — MORPHINE SULFATE 4 MG: 4 INJECTION INTRAVENOUS at 08:15

## 2019-06-18 RX ADMIN — GUAIFENESIN 600 MG: 600 TABLET, EXTENDED RELEASE ORAL at 08:14

## 2019-06-18 RX ADMIN — SODIUM CHLORIDE, PRESERVATIVE FREE 10 ML: 5 INJECTION INTRAVENOUS at 13:35

## 2019-06-18 RX ADMIN — HYDROCODONE BITARTRATE AND ACETAMINOPHEN 1 TABLET: 7.5; 325 TABLET ORAL at 01:45

## 2019-06-18 RX ADMIN — PROMETHAZINE HYDROCHLORIDE 6.25 MG: 25 INJECTION INTRAMUSCULAR; INTRAVENOUS at 18:23

## 2019-06-18 RX ADMIN — LEVOTHYROXINE SODIUM 125 MCG: 125 TABLET ORAL at 05:40

## 2019-06-18 RX ADMIN — DICYCLOMINE HYDROCHLORIDE 20 MG: 20 TABLET ORAL at 18:24

## 2019-06-18 RX ADMIN — CLONIDINE HYDROCHLORIDE 0.1 MG: 0.1 TABLET ORAL at 08:14

## 2019-06-18 RX ADMIN — HYDROCODONE BITARTRATE AND ACETAMINOPHEN 1 TABLET: 7.5; 325 TABLET ORAL at 20:46

## 2019-06-18 RX ADMIN — ZOLPIDEM TARTRATE 5 MG: 5 TABLET ORAL at 20:47

## 2019-06-18 RX ADMIN — ESTRADIOL 0.5 MG: 1 TABLET ORAL at 08:14

## 2019-06-18 RX ADMIN — DICYCLOMINE HYDROCHLORIDE 20 MG: 20 TABLET ORAL at 08:32

## 2019-06-18 ASSESSMENT — PAIN DESCRIPTION - PAIN TYPE
TYPE: SURGICAL PAIN
TYPE: ACUTE PAIN;SURGICAL PAIN
TYPE: SURGICAL PAIN
TYPE: SURGICAL PAIN

## 2019-06-18 ASSESSMENT — PAIN DESCRIPTION - ORIENTATION
ORIENTATION: LEFT

## 2019-06-18 ASSESSMENT — PAIN SCALES - GENERAL
PAINLEVEL_OUTOF10: 6
PAINLEVEL_OUTOF10: 7
PAINLEVEL_OUTOF10: 7
PAINLEVEL_OUTOF10: 6
PAINLEVEL_OUTOF10: 7
PAINLEVEL_OUTOF10: 6
PAINLEVEL_OUTOF10: 5
PAINLEVEL_OUTOF10: 7
PAINLEVEL_OUTOF10: 7
PAINLEVEL_OUTOF10: 3
PAINLEVEL_OUTOF10: 7
PAINLEVEL_OUTOF10: 6
PAINLEVEL_OUTOF10: 4

## 2019-06-18 ASSESSMENT — PAIN DESCRIPTION - FREQUENCY
FREQUENCY: CONTINUOUS

## 2019-06-18 ASSESSMENT — PAIN DESCRIPTION - PROGRESSION: CLINICAL_PROGRESSION: NOT CHANGED

## 2019-06-18 ASSESSMENT — PAIN DESCRIPTION - LOCATION
LOCATION: FOOT
LOCATION: FOOT;GENERALIZED

## 2019-06-18 ASSESSMENT — PAIN DESCRIPTION - DESCRIPTORS
DESCRIPTORS: ACHING;THROBBING
DESCRIPTORS: ACHING
DESCRIPTORS: BURNING
DESCRIPTORS: BURNING
DESCRIPTORS: ACHING

## 2019-06-18 ASSESSMENT — PAIN DESCRIPTION - ONSET
ONSET: GRADUAL
ONSET: GRADUAL
ONSET: ON-GOING
ONSET: ON-GOING

## 2019-06-18 ASSESSMENT — PAIN - FUNCTIONAL ASSESSMENT: PAIN_FUNCTIONAL_ASSESSMENT: PREVENTS OR INTERFERES SOME ACTIVE ACTIVITIES AND ADLS

## 2019-06-18 ASSESSMENT — ENCOUNTER SYMPTOMS: SHORTNESS OF BREATH: 1

## 2019-06-18 NOTE — ANESTHESIA PRE PROCEDURE
Department of Anesthesiology  Preprocedure Note       Name:  Wei Ho   Age:  46 y.o.  :  1970                                          MRN:  [id number]         Date:  2019      Surgeon: Meg Mars):  Arsalan Griffiths MD    Procedure: EGD ESOPHAGOGASTRODUODENOSCOPY (N/A )    Medications prior to admission:   Prior to Admission medications    Medication Sig Start Date End Date Taking? Authorizing Provider   tiZANidine (ZANAFLEX) 4 MG tablet Take 4 mg by mouth 2 times daily   Yes Historical Provider, MD   promethazine (PHENERGAN) 25 MG tablet Take 1 tablet by mouth every 6 hours as needed for Nausea 19  Yes Arsalan Griffiths MD   HYDROcodone-acetaminophen (NORCO) 7.5-325 MG per tablet Take 1 tablet by mouth every 8 hours as needed for Pain. Yes Historical Provider, MD   ondansetron (ZOFRAN) 4 MG tablet Take 4 mg by mouth every 8 hours as needed for Nausea or Vomiting   Yes Historical Provider, MD   estradiol (ESTRACE) 0.5 MG tablet Take 0.5 mg by mouth daily   Yes Historical Provider, MD   dicyclomine (BENTYL) 20 MG tablet Take 20 mg by mouth every 6 hours   Yes Historical Provider, MD   atenolol (TENORMIN) 25 MG tablet Take 25 mg by mouth daily   Yes Historical Provider, MD   cloNIDine (CATAPRES) 0.1 MG tablet Take 0.1 mg by mouth 2 times daily   Yes Historical Provider, MD   Multiple Vitamin (MVI, BARIATRIC ADVANTAGE MULTI-FORMULA, CHEW TAB) Take 1 tablet by mouth daily 19  Yes Arsalan Griffiths MD   Calcium Citrate-Vitamin D (CALCIUM + VIT D, BARIATRIC ADVANTAGE, CHEWABLE TABLET) Take 1 tablet by mouth 2 times daily 19  Yes Arsalan Griffiths MD   pantoprazole (PROTONIX) 40 MG tablet Take 1 tablet by mouth 2 times daily 19  Yes Arsalan Griffiths MD   Nutritional Supplements (ENSURE HIGH PROTEIN) LIQD Take 1 Can by mouth 4 times daily 2 week pre op diet. No other foods.  19  Yes MICHAEL Truong - CNP   levothyroxine (SYNTHROID) 125 MCG tablet Take 1 tablet by mouth Daily  Patient taking differently: Take 125 mcg by mouth nightly  8/8/18  Yes Autumn Garcia MD   gabapentin (NEURONTIN) 800 MG tablet Take 800 mg by mouth 3 times daily   Yes Historical Provider, MD   PARoxetine (PAXIL) 30 MG tablet Take 30 mg by mouth nightly    Yes Historical Provider, MD   pantoprazole (PROTONIX) 20 MG tablet Take 2 tablets by mouth 2 times daily 4/4/19   Autumn Garcia MD       Current medications:    Current Facility-Administered Medications   Medication Dose Route Frequency Provider Last Rate Last Dose    ciprofloxacin (CILOXAN) 0.3 % ophthalmic solution 1 drop  1 drop Both Eyes Q4H While awake Black De Guzman MD   1 drop at 06/18/19 1604    ondansetron (ZOFRAN) injection 8 mg  8 mg Intravenous Q8H PRN Darline Cotto MD   8 mg at 06/18/19 1716    promethazine (PHENERGAN) injection 6.25 mg  6.25 mg Intravenous Q6H PRN Paulette Irizarry MD        HYDROcodone-acetaminophen (NORCO) 7.5-325 MG per tablet 1 tablet  1 tablet Oral Q4H PRN Mac Whittaker DPM   1 tablet at 06/18/19 1604    morphine sulfate (PF) injection 2 mg  2 mg Intravenous Q4H PRN Mac Whittaker DPM        Or    morphine sulfate (PF) injection 4 mg  4 mg Intravenous Q4H PRN KODI CastilloM   4 mg at 06/18/19 1336    zolpidem (AMBIEN) tablet 5 mg  5 mg Oral Nightly PRN Darline Cotto MD   5 mg at 06/17/19 2044    guaiFENesin (MUCINEX) extended release tablet 600 mg  600 mg Oral BID Darline Cotto MD   600 mg at 06/18/19 0814    fluticasone (FLONASE) 50 MCG/ACT nasal spray 1 spray  1 spray Each Nostril Daily Darline Cotto MD   1 spray at 06/18/19 0825    atenolol (TENORMIN) tablet 25 mg  25 mg Oral Daily Laila Kirkpatrick MD   25 mg at 06/18/19 0775    cloNIDine (CATAPRES) tablet 0.1 mg  0.1 mg Oral BID Laila Kirkpatrick MD   0.1 mg at 06/18/19 2254    estradiol (ESTRACE) tablet 0.5 mg  0.5 mg Oral Daily Laila Kirkpatrick MD   0.5 mg at 06/18/19 0814    dicyclomine (BENTYL) tablet 20 mg  20 mg Oral Q6H Darell Chan MD   20 mg at 06/18/19 1335    gabapentin (NEURONTIN) capsule 800 mg  800 mg Oral TID Darell Chan MD   800 mg at 06/18/19 1335    levothyroxine (SYNTHROID) tablet 125 mcg  125 mcg Oral Daily Darell Chan MD   125 mcg at 06/18/19 0540    pantoprazole (PROTONIX) tablet 40 mg  40 mg Oral BID AC Darell Chan MD   40 mg at 06/18/19 0551    PARoxetine (PAXIL) tablet 30 mg  30 mg Oral Nightly Darell Chan MD   30 mg at 06/17/19 2044    sodium chloride flush 0.9 % injection 10 mL  10 mL Intravenous 2 times per day Darell Chan MD   10 mL at 06/18/19 0815    sodium chloride flush 0.9 % injection 10 mL  10 mL Intravenous PRN Darell Chan MD   10 mL at 06/18/19 1335    magnesium hydroxide (MILK OF MAGNESIA) 400 MG/5ML suspension 30 mL  30 mL Oral Daily PRN Darell Chan MD        enoxaparin (LOVENOX) injection 40 mg  40 mg Subcutaneous Daily Darell Chan MD   40 mg at 06/18/19 0815    acetaminophen (TYLENOL) tablet 650 mg  650 mg Oral Q4H PRN Darell Chan MD   650 mg at 06/16/19 1452    vancomycin (VANCOCIN) 1,750 mg in dextrose 5 % 500 mL IVPB  1,750 mg Intravenous Q12H Darell Chan  mL/hr at 06/18/19 1716 1,750 mg at 06/18/19 1716       Allergies:     Allergies   Allergen Reactions    Aspirin Palpitations     \"My Heart Rate Elevates\"    Compazine [Prochlorperazine Maleate] Rash    Reglan [Metoclopramide Hcl] Rash    Shellfish-Derived Products Swelling    Toradol [Ketorolac Tromethamine] Rash       Problem List:    Patient Active Problem List   Diagnosis Code    Abdominal pain R10.9    Rectal bleeding K62.5    Fever R50.9    Hematemesis K92.0    Fibromyalgia M79.7    Lupus (systemic lupus erythematosus) (HCC) M32.9    Nausea & vomiting R11.2    Chest pain R07.9    Chronic pancreatitis (HCC) K86.1    HTN (hypertension) I10    Acute pancreatitis K85.90    Right-sided chest pain R07.9    Super obesity E66.9    Normocytic anemia D64.9    Hypokalemia E87.6    Generalized abdominal pain R10.84    Pneumonia of both lungs due to infectious organism J18.9    Foot ulcer, left ([facility]) L97.529    SLE (systemic lupus erythematosus related syndrome) ([facility]) M32.9    Hypoxia R09.02    Cellulitis L03.90    Pancytopenia ([facility]) D61.818    Pleurisy R09.1    Frequent UTI N39.0    Gastroesophageal reflux disease without esophagitis K21.9    MRSA cellulitis of left foot L03.116, B95.62    Depression F32.9    Fatty liver K76.0    Fatty liver disease, nonalcoholic V04.9    Arthritis M19.90    Bilateral low back pain with left-sided sciatica M54.42    Morbid obesity with BMI of 50.0-59.9, adult ([facility]) E66.01, Z68.43    Intractable vomiting with nausea R11.2    Class 3 obesity in adult [id number]    Hiatal hernia K44.9    Poor intravenous access Z78.9    Post-operative nausea and vomiting R11.2, Z98.890    Status post bariatric surgery Z98.84    Status post laparoscopic sleeve gastrectomy Z98.84    Abscess L02.91    Dysphagia R13.10    Pseudoseizure F44.5    Chronic pain syndrome G89.4    Drug-seeking/Aberrant behavior Z76.5    Bacteremia R78.81    Acute conjunctivitis H10.30       Past Medical History:        Diagnosis Date    Acid reflux     Anemia     Anxiety     Arthritis     Hands, Back And Ankles    Bleeding ulcer 2014    \"I Had Ulcers In My Stomach And Colon\"    Bronchitis Last Episode 2014    Chronic back pain     [facility]     [facility]    COPD (chronic obstructive pulmonary disease) ([facility] [address]     Sees Dr. Temitope Welsh    Depression     Fibromyalgia Dx 2013    H/O echocardiogram 8/11/15    EF >55%. LA to be at the upper limit of normal in size. LV hypertrophy with normal LV systolic, but abnormal diastolic function. Normal valvular structures and function.      H/O echocardiogram 08/30/2018    EF 55-60%    Hiatal hernia     History of blood transfusion 09/2015 And 2018    No Reaction To  OTHER SURGICAL HISTORY  Last Done 7-15-16    Mediport Insertion \"Total Of Six Done, Removed Last Mediport In 2014\"    SLEEVE GASTRECTOMY N/A 2/12/2019    GASTRECTOMY SLEEVE LAPAROSCOPIC ROBOTIC performed by Xochitl Darnell MD at [facility]  08/27/2018    UPPER GASTROINTESTINAL ENDOSCOPY N/A 4/2/2019    EGD CONTROL HEMORRHAGE with epi injection at bleeding site performed by [facility]itl Darnell MD at [facility] History:    Social History     Tobacco Use    Smoking status: Never Smoker    Smokeless tobacco: Never Used   Substance Use Topics    Alcohol use: No     Alcohol/week: 0.0 oz                                Counseling given: Not Answered      Vital Signs (Current):   Vitals:    06/18/19 0813 06/18/19 0817 06/18/19 0945 06/18/19 1025   BP: (!) 151/106   (!) 116/58   Pulse: 66 76 68 62   Resp:   18 18   Temp:    37.1 °C (98.7 °F)   TempSrc:    Oral   SpO2:   93% 94%   Weight:       Height:                                                  BP Readings from Last 3 Encounters:   06/18/19 (!) 116/58   06/16/19 105/89   06/13/19 118/70       NPO Status: Time of last liquid consumption: 2000                        Time of last solid consumption: 1600                        Date of last liquid consumption: 06/15/19                        Date of last solid food consumption: 06/14/19    BMI:   Wt Readings from Last 3 Encounters:   06/18/19 275 lb 5.7 oz (124.9 kg)   06/12/19 271 lb (122.9 kg)   04/29/19 271 lb (122.9 kg)     Body mass index is 47.26 kg/m².     CBC:   Lab Results   Component Value Date    WBC 4.3 06/18/2019    RBC 3.57 06/18/2019    HGB 8.7 06/18/2019    HCT 29.6 06/18/2019    MCV 82.9 06/18/2019    RDW 14.3 06/18/2019     06/18/2019       CMP:   Lab Results   Component Value Date     06/16/2019    K 4.0 06/16/2019     06/16/2019    CO2 22 06/16/2019    BUN 8 06/16/2019    CREATININE 0.7 06/16/2019    GFRAA >60 06/16/2019    LABGLOM >60 06/16/2019    GLUCOSE 113 06/16/2019    PROT 6.1 06/14/2019    PROT 6.5 11/18/2011    CALCIUM 9.4 06/16/2019    BILITOT 0.2 06/14/2019    ALKPHOS 137 06/14/2019    AST 21 06/14/2019    ALT 20 06/14/2019       POC Tests: No results for input(s): POCGLU, POCNA, POCK, POCCL, POCBUN, POCHEMO, POCHCT in the last 72 hours. Coags:   Lab Results   Component Value Date    PROTIME 11.3 03/20/2019    PROTIME 11.4 12/01/2010    INR 0.99 03/20/2019    APTT 26.4 03/20/2019       HCG (If Applicable):   Lab Results   Component Value Date    PREGTESTUR NEGATIVE 07/10/2017        ABGs: No results found for: PHART, PO2ART, FQJ9HPA, XFA4MCP, BEART, U9ANEYST     Type & Screen (If Applicable):  No results found for: LABABO, [address]    Anesthesia Evaluation  Patient summary reviewed and Nursing notes reviewed history of anesthetic complications (does not complain of PONV when interviewed): Airway: Mallampati: II  TM distance: <3 FB   Neck ROM: full  Mouth opening: > = 3 FB Dental:          Pulmonary: breath sounds clear to auscultation  (+) pneumonia:  COPD:  shortness of breath: chronic,  sleep apnea: on CPAP,                             Cardiovascular:  Exercise tolerance: poor (<4 METS),   (+) hypertension: moderate, PARSONS:,         Rhythm: regular  Rate: normal           Beta Blocker:  Dose within 24 Hrs      ROS comment: EF >55%. LA to be at the upper limit of normal in size. LV hypertrophy with normal LV systolic, but abnormal diastolic function. Normal valvular structures and function. Neuro/Psych:   (+) neuromuscular disease:,             GI/Hepatic/Renal:   (+) hiatal hernia, GERD:, PUD, liver disease:, morbid obesity          Endo/Other:    (+) hypothyroidism: arthritis: OA., .                  ROS comment: Lupus Abdominal:   (+) obese,     Abdomen: soft.     Vascular:                                      Anesthesia Plan      TIVA and MAC     ASA 3       Induction: intravenous. Anesthetic plan and risks discussed with patient. Use of blood products discussed with patient whom consented to blood products. Plan discussed with CRNA. MICHAEL Rousseau - CRNA   6/18/2019    Pre Anesthesia Evaluation complete. Anesthesia plan, risks, benefits, alternatives, and personnel discussed with patient and/or legal guardian. Patient and/or legal guardian verbalized an understanding and agreed to proceed. Anesthesia plan discussed with care team members and agreed upon.   MICHAEL Pham - JOSE MARIA  6/19/2019

## 2019-06-18 NOTE — PROGRESS NOTES
[address] [facility]  consulted by Dr. Eboni Rogers for monitoring and adjustment. Indication for treatment: infected foot abscess, left foot osteomyelitis  Goal trough: 15 mcg/mL     Pertinent Laboratory Values:   Temp Readings from Last 3 Encounters:   06/18/19 98.7 °F (37.1 °C) (Oral)   06/16/19 98.6 °F (37 °C)   06/13/19 98.3 °F (36.8 °C) (Oral)     Recent Labs     06/15/19  1812 06/16/19  0327   WBC  --  5.7   LACTATE 1.2  --      Recent Labs     06/15/19  1812 06/16/19  0327   BUN 10 8   CREATININE 0.7 0.7     Estimated Creatinine Clearance: 126 mL/min (based on SCr of 0.7 mg/dL). Intake/Output Summary (Last 24 hours) at 6/18/2019 1231  Last data filed at 6/18/2019 0824  Gross per 24 hour   Intake 1606 ml   Output --   Net 1606 ml       Pertinent Cultures:  Date    Source    Results  6/13   Wound    MSSA  6/14   Blood    Negative  6/16   Surgical   MSSA    Vancomycin level:   TROUGH:    Recent Labs     06/16/19  1523   VANCOTROUGH 15.5     RANDOM:  No results for input(s): VANCORANDOM in the last 72 hours. Assessment:  · WBC and temperature: WNL; Afebrile. · SCr, BUN, and urine output: No new Scr (ordered for 6/19). Previously stable. · Day(s) of therapy: #4  · Vancomycin level: 15.5 mcg/mL on 06-16-19. Plan:  · Renal labs have been previously stable. · Most recent trough level was therapeutic. · Plan to continue Ms. Pope on current dosing: vancomycin 1750 mg q12h IVPB. · Repeat trough in 4 days, 06-20-19 @ 1530. · Most recent/repeat blood cultures negative (originally concerned for bacteremia). · Vancomycin can be de-escalated given MSSA seen on wound and surgical cultures if appropriate per clinical course. · Pharmacy will continue to monitor patient and adjust therapy as indicated. Thank you for the consult. Priyank Nur

## 2019-06-18 NOTE — CONSULTS
[address], [address] [facility]                                  CONSULTATION    PATIENT NAME: Monika Freeman                  :        1970  MED REC NO:   [id number]                          ROOM:         ACCOUNT NO:   [de-identified]                           ADMIT DATE: 2019  PROVIDER:     Umesh Fuller DPM    CONSULT DATE:  2019    DATE OF SURGERY:  2019    HISTORY OF PRESENT ILLNESS:  The patient is a very pleasant 78-year-old  female who was seen for a left bone infection with sinus tract  formation. The patient subsequently had the procedure performed to  excise the sinus tract, curettage the bone, power lavage, and then pack  with Osteoset and vancomycin powder and then closure. The patient is  seen at the bedside today. The patient is doing well. The patient has  mild complaints of pain, but overall appears to be in good spirits and  doing very well at this time. PHYSICAL EXAMINATION:  VITAL SIGNS:  Currently show that her temperature 98.7, respirations 18,  pulse 62, and blood pressure 116/58. SKIN:  Shows that the surgical side appears to be well healing. There  are negative signs or symptoms of infection. Negative pus, odor,  drainage. No overextending redness or cellulitis associated to the  area. There is no significant swelling to the region as well. No  dehiscence to the suture site, excellent appearance at this time. LABORATORY DATA:  The patient's culture report came back as Staph  aureus. ASSESSMENT:  1.  Status post osteomyelitis/bone infection on the left first  metatarsal.  2.  Sinus tract formation and postop surgery. TREATMENT:  The patient at this time is educated about her condition. We are going to do Betadine, Adaptic, 4x4's, Kerlix, and Ace to the  region. The patient is to be nonweightbearing. The patient is  currently getting vancomycin.   We may consider doing a PICC line and six  weeks of IV antibiotics, may consult Dr. Elliot Mckinnon for further  evaluation. Consider six weeks of IV antibiotics for her based upon the  culture evaluation. The patient is to be nonweightbearing and then make  a followup appointment to see me in the office at [phone]. We have  asked to keep her for another day or so and then possibly let her go on  Thursday. If you have any questions or concerns, please give me a call  [phone] or _____.         Valentino Ball DPM    D: 06/18/2019 13:10:25       T: 06/18/2019 15:46:34     [email or web address]  Job#: [id number]     Doc#: [id number]    CC:

## 2019-06-19 ENCOUNTER — ANESTHESIA (OUTPATIENT)
Dept: ENDOSCOPY | Age: 49
DRG: 501 | End: 2019-06-19
Payer: COMMERCIAL

## 2019-06-19 VITALS — OXYGEN SATURATION: 98 % | SYSTOLIC BLOOD PRESSURE: 108 MMHG | DIASTOLIC BLOOD PRESSURE: 56 MMHG

## 2019-06-19 LAB
BUN BLDV-MCNC: 11 MG/DL (ref 6–23)
CREAT SERPL-MCNC: 0.7 MG/DL (ref 0.6–1.1)
CULTURE: ABNORMAL
CULTURE: ABNORMAL
CULTURE: NORMAL
GFR AFRICAN AMERICAN: >60 ML/MIN/1.73M2
GFR NON-AFRICAN AMERICAN: >60 ML/MIN/1.73M2
IRON: 16 UG/DL (ref 37–145)
Lab: ABNORMAL
Lab: NORMAL
PCT TRANSFERRIN: 5 % (ref 10–44)
SPECIMEN: ABNORMAL
SPECIMEN: NORMAL
TOTAL IRON BINDING CAPACITY: 303 UG/DL (ref 250–450)
UNSATURATED IRON BINDING CAPACITY: 287 UG/DL (ref 110–370)

## 2019-06-19 PROCEDURE — 2500000003 HC RX 250 WO HCPCS: Performed by: NURSE ANESTHETIST, CERTIFIED REGISTERED

## 2019-06-19 PROCEDURE — 2580000003 HC RX 258: Performed by: FAMILY MEDICINE

## 2019-06-19 PROCEDURE — 3700000000 HC ANESTHESIA ATTENDED CARE: Performed by: SURGERY

## 2019-06-19 PROCEDURE — 6370000000 HC RX 637 (ALT 250 FOR IP): Performed by: PODIATRIST

## 2019-06-19 PROCEDURE — 0DJ08ZZ INSPECTION OF UPPER INTESTINAL TRACT, VIA NATURAL OR ARTIFICIAL OPENING ENDOSCOPIC: ICD-10-PCS | Performed by: SURGERY

## 2019-06-19 PROCEDURE — 43235 EGD DIAGNOSTIC BRUSH WASH: CPT | Performed by: SURGERY

## 2019-06-19 PROCEDURE — 84520 ASSAY OF UREA NITROGEN: CPT

## 2019-06-19 PROCEDURE — 99232 SBSQ HOSP IP/OBS MODERATE 35: CPT | Performed by: SURGERY

## 2019-06-19 PROCEDURE — 6370000000 HC RX 637 (ALT 250 FOR IP): Performed by: FAMILY MEDICINE

## 2019-06-19 PROCEDURE — 6360000002 HC RX W HCPCS: Performed by: FAMILY MEDICINE

## 2019-06-19 PROCEDURE — 3609012800 HC EGD DIAGNOSTIC ONLY: Performed by: SURGERY

## 2019-06-19 PROCEDURE — 3700000001 HC ADD 15 MINUTES (ANESTHESIA): Performed by: SURGERY

## 2019-06-19 PROCEDURE — 6360000002 HC RX W HCPCS: Performed by: NURSE ANESTHETIST, CERTIFIED REGISTERED

## 2019-06-19 PROCEDURE — 82565 ASSAY OF CREATININE: CPT

## 2019-06-19 PROCEDURE — 1200000000 HC SEMI PRIVATE

## 2019-06-19 PROCEDURE — 2709999900 HC NON-CHARGEABLE SUPPLY: Performed by: SURGERY

## 2019-06-19 PROCEDURE — 6360000002 HC RX W HCPCS: Performed by: PODIATRIST

## 2019-06-19 PROCEDURE — 6360000002 HC RX W HCPCS: Performed by: SURGERY

## 2019-06-19 PROCEDURE — 2580000003 HC RX 258: Performed by: NURSE ANESTHETIST, CERTIFIED REGISTERED

## 2019-06-19 RX ORDER — SODIUM CHLORIDE 9 MG/ML
INJECTION, SOLUTION INTRAVENOUS CONTINUOUS PRN
Status: DISCONTINUED | OUTPATIENT
Start: 2019-06-19 | End: 2019-06-19 | Stop reason: SDUPTHER

## 2019-06-19 RX ORDER — PROPOFOL 10 MG/ML
INJECTION, EMULSION INTRAVENOUS PRN
Status: DISCONTINUED | OUTPATIENT
Start: 2019-06-19 | End: 2019-06-19 | Stop reason: SDUPTHER

## 2019-06-19 RX ORDER — LIDOCAINE HYDROCHLORIDE 20 MG/ML
INJECTION, SOLUTION EPIDURAL; INFILTRATION; INTRACAUDAL; PERINEURAL PRN
Status: DISCONTINUED | OUTPATIENT
Start: 2019-06-19 | End: 2019-06-19 | Stop reason: SDUPTHER

## 2019-06-19 RX ADMIN — ONDANSETRON 8 MG: 2 INJECTION INTRAMUSCULAR; INTRAVENOUS at 14:02

## 2019-06-19 RX ADMIN — GUAIFENESIN 600 MG: 600 TABLET, EXTENDED RELEASE ORAL at 21:24

## 2019-06-19 RX ADMIN — PROPOFOL 60 MG: 10 INJECTION, EMULSION INTRAVENOUS at 09:10

## 2019-06-19 RX ADMIN — SODIUM CHLORIDE, PRESERVATIVE FREE 10 ML: 5 INJECTION INTRAVENOUS at 04:20

## 2019-06-19 RX ADMIN — DICYCLOMINE HYDROCHLORIDE 20 MG: 20 TABLET ORAL at 18:31

## 2019-06-19 RX ADMIN — MORPHINE SULFATE 4 MG: 4 INJECTION INTRAVENOUS at 09:58

## 2019-06-19 RX ADMIN — PAROXETINE 30 MG: 10 TABLET, FILM COATED ORAL at 21:24

## 2019-06-19 RX ADMIN — HYDROCODONE BITARTRATE AND ACETAMINOPHEN 1 TABLET: 7.5; 325 TABLET ORAL at 16:46

## 2019-06-19 RX ADMIN — DICYCLOMINE HYDROCHLORIDE 20 MG: 20 TABLET ORAL at 00:23

## 2019-06-19 RX ADMIN — LIDOCAINE HYDROCHLORIDE 50 MG: 20 INJECTION, SOLUTION EPIDURAL; INFILTRATION; INTRACAUDAL; PERINEURAL at 09:08

## 2019-06-19 RX ADMIN — DICYCLOMINE HYDROCHLORIDE 20 MG: 20 TABLET ORAL at 13:44

## 2019-06-19 RX ADMIN — PROPOFOL 40 MG: 10 INJECTION, EMULSION INTRAVENOUS at 09:12

## 2019-06-19 RX ADMIN — GABAPENTIN 800 MG: 400 CAPSULE ORAL at 21:24

## 2019-06-19 RX ADMIN — CIPROFLOXACIN HYDROCHLORIDE 1 DROP: 3 SOLUTION/ DROPS OPHTHALMIC at 13:43

## 2019-06-19 RX ADMIN — PANTOPRAZOLE SODIUM 40 MG: 40 TABLET, DELAYED RELEASE ORAL at 16:46

## 2019-06-19 RX ADMIN — SODIUM CHLORIDE, PRESERVATIVE FREE 10 ML: 5 INJECTION INTRAVENOUS at 14:03

## 2019-06-19 RX ADMIN — LIDOCAINE HYDROCHLORIDE 50 MG: 20 INJECTION, SOLUTION EPIDURAL; INFILTRATION; INTRACAUDAL; PERINEURAL at 09:09

## 2019-06-19 RX ADMIN — ONDANSETRON 8 MG: 2 INJECTION INTRAMUSCULAR; INTRAVENOUS at 04:20

## 2019-06-19 RX ADMIN — CLONIDINE HYDROCHLORIDE 0.1 MG: 0.1 TABLET ORAL at 21:24

## 2019-06-19 RX ADMIN — PROMETHAZINE HYDROCHLORIDE 6.25 MG: 25 INJECTION INTRAMUSCULAR; INTRAVENOUS at 00:12

## 2019-06-19 RX ADMIN — ZOLPIDEM TARTRATE 5 MG: 5 TABLET ORAL at 21:25

## 2019-06-19 RX ADMIN — MORPHINE SULFATE 4 MG: 4 INJECTION INTRAVENOUS at 14:02

## 2019-06-19 RX ADMIN — CLONIDINE HYDROCHLORIDE 0.1 MG: 0.1 TABLET ORAL at 13:44

## 2019-06-19 RX ADMIN — ATENOLOL 25 MG: 25 TABLET ORAL at 13:44

## 2019-06-19 RX ADMIN — MORPHINE SULFATE 4 MG: 4 INJECTION INTRAVENOUS at 00:12

## 2019-06-19 RX ADMIN — HYDROCODONE BITARTRATE AND ACETAMINOPHEN 1 TABLET: 7.5; 325 TABLET ORAL at 21:25

## 2019-06-19 RX ADMIN — VANCOMYCIN HYDROCHLORIDE 1750 MG: 5 INJECTION, POWDER, LYOPHILIZED, FOR SOLUTION INTRAVENOUS at 04:23

## 2019-06-19 RX ADMIN — GUAIFENESIN 600 MG: 600 TABLET, EXTENDED RELEASE ORAL at 13:44

## 2019-06-19 RX ADMIN — ENOXAPARIN SODIUM 40 MG: 40 INJECTION SUBCUTANEOUS at 13:43

## 2019-06-19 RX ADMIN — CIPROFLOXACIN HYDROCHLORIDE 1 DROP: 3 SOLUTION/ DROPS OPHTHALMIC at 18:31

## 2019-06-19 RX ADMIN — PROMETHAZINE HYDROCHLORIDE 6.25 MG: 25 INJECTION INTRAMUSCULAR; INTRAVENOUS at 09:58

## 2019-06-19 RX ADMIN — GABAPENTIN 800 MG: 400 CAPSULE ORAL at 13:44

## 2019-06-19 RX ADMIN — CIPROFLOXACIN HYDROCHLORIDE 1 DROP: 3 SOLUTION/ DROPS OPHTHALMIC at 21:26

## 2019-06-19 RX ADMIN — ESTRADIOL 0.5 MG: 1 TABLET ORAL at 13:43

## 2019-06-19 RX ADMIN — SODIUM CHLORIDE: 9 INJECTION, SOLUTION INTRAVENOUS at 08:41

## 2019-06-19 RX ADMIN — MORPHINE SULFATE 4 MG: 4 INJECTION INTRAVENOUS at 18:31

## 2019-06-19 RX ADMIN — VANCOMYCIN HYDROCHLORIDE 1750 MG: 5 INJECTION, POWDER, LYOPHILIZED, FOR SOLUTION INTRAVENOUS at 17:02

## 2019-06-19 RX ADMIN — MORPHINE SULFATE 4 MG: 4 INJECTION INTRAVENOUS at 04:20

## 2019-06-19 RX ADMIN — PROPOFOL 50 MG: 10 INJECTION, EMULSION INTRAVENOUS at 09:08

## 2019-06-19 RX ADMIN — SODIUM CHLORIDE: 9 INJECTION, SOLUTION INTRAVENOUS at 09:03

## 2019-06-19 RX ADMIN — SODIUM CHLORIDE, PRESERVATIVE FREE 10 ML: 5 INJECTION INTRAVENOUS at 21:26

## 2019-06-19 RX ADMIN — PROMETHAZINE HYDROCHLORIDE 6.25 MG: 25 INJECTION INTRAMUSCULAR; INTRAVENOUS at 18:31

## 2019-06-19 RX ADMIN — CIPROFLOXACIN HYDROCHLORIDE 1 DROP: 3 SOLUTION/ DROPS OPHTHALMIC at 06:22

## 2019-06-19 ASSESSMENT — PAIN SCALES - GENERAL
PAINLEVEL_OUTOF10: 6
PAINLEVEL_OUTOF10: 7
PAINLEVEL_OUTOF10: 6
PAINLEVEL_OUTOF10: 7
PAINLEVEL_OUTOF10: 7

## 2019-06-19 ASSESSMENT — PAIN DESCRIPTION - ORIENTATION
ORIENTATION: LEFT
ORIENTATION: LEFT

## 2019-06-19 ASSESSMENT — PAIN DESCRIPTION - LOCATION
LOCATION: FOOT
LOCATION: FOOT

## 2019-06-19 ASSESSMENT — PAIN DESCRIPTION - ONSET
ONSET: ON-GOING
ONSET: ON-GOING

## 2019-06-19 ASSESSMENT — PAIN DESCRIPTION - PROGRESSION
CLINICAL_PROGRESSION: NOT CHANGED

## 2019-06-19 ASSESSMENT — PAIN DESCRIPTION - PAIN TYPE
TYPE: ACUTE PAIN
TYPE: ACUTE PAIN

## 2019-06-19 ASSESSMENT — PAIN DESCRIPTION - DESCRIPTORS
DESCRIPTORS: ACHING
DESCRIPTORS: ACHING

## 2019-06-19 ASSESSMENT — PAIN DESCRIPTION - FREQUENCY: FREQUENCY: CONTINUOUS

## 2019-06-19 NOTE — PROGRESS NOTES
Attending Progress Note      PCP: Rodrigo Rosales MD    Date of Admission: 6/14/2019    Chief Complaint:   Chief Complaint   Patient presents with    Other     positive blood cultures           Subjective: pain at left  foot . Carlynn Jessy Both eyes with less  irritation/secretions . Carlynn Jessy No fever/chills , no N/V/diarrhea     Medications:  Reviewed  Infusion Medications     Scheduled Medications    ciprofloxacin  1 drop Both Eyes Q4H While awake    guaiFENesin  600 mg Oral BID    fluticasone  1 spray Each Nostril Daily    atenolol  25 mg Oral Daily    cloNIDine  0.1 mg Oral BID    estradiol  0.5 mg Oral Daily    dicyclomine  20 mg Oral Q6H    gabapentin  800 mg Oral TID    levothyroxine  125 mcg Oral Daily    pantoprazole  40 mg Oral BID AC    PARoxetine  30 mg Oral Nightly    sodium chloride flush  10 mL Intravenous 2 times per day    enoxaparin  40 mg Subcutaneous Daily    vancomycin  1,750 mg Intravenous Q12H     PRN Meds: ondansetron, promethazine, HYDROcodone-acetaminophen, morphine **OR** morphine, zolpidem, sodium chloride flush, magnesium hydroxide, acetaminophen      Intake/Output Summary (Last 24 hours) at 6/19/2019 1636  Last data filed at 6/19/2019 1402  Gross per 24 hour   Intake 1240 ml   Output --   Net 1240 ml       Exam:  BP (!) 139/95   Pulse 68   Temp 98.5 °F (36.9 °C) (Oral)   Resp 16   Ht 5' 4\" (1.626 m)   Wt 275 lb 5.7 oz (124.9 kg)   SpO2 95%   BMI 47.26 kg/m²   General appearance: No distress, appears stated age and cooperative. Respiratory:  symmetrical , good air entry , no Rales , No wheezing, or rhonchi,  Cardiovascular: RRR, with normal S1/S2 without murmurs. Abdomen : Soft, non-tender, non-distended  , normal bowel sounds. Musculoskelatal: left foot wrapped  No edema bilaterally. No DVT signs ,  Full range of motion   Skin: Skin color, texture, turgor normal.  No rashes or lesions.   Neurologic:  Alert and oriented , no focal sensory/motor deficits , grossly

## 2019-06-19 NOTE — PROGRESS NOTES
[address] [facility]  consulted by Dr. Orlando Bravo for monitoring and adjustment. Indication for treatment: infected foot abscess, left foot osteomyelitis  Goal trough: 15 mcg/mL     Pertinent Laboratory Values:   Temp Readings from Last 3 Encounters:   06/19/19 98.5 °F (36.9 °C) (Oral)   06/16/19 98.6 °F (37 °C)   06/13/19 98.3 °F (36.8 °C) (Oral)     Recent Labs     06/18/19  1345   WBC 4.3     Recent Labs     06/19/19  0414   BUN 11   CREATININE 0.7     Estimated Creatinine Clearance: 126 mL/min (based on SCr of 0.7 mg/dL). Intake/Output Summary (Last 24 hours) at 6/19/2019 1345  Last data filed at 6/19/2019 0924  Gross per 24 hour   Intake 1230 ml   Output --   Net 1230 ml       Pertinent Cultures:  Date    Source    Results  6/13   Wound    MSSA  6/14   Blood    Negative  6/16   Surgical   MSSA    Vancomycin level:   TROUGH:    Recent Labs     06/16/19  1523   VANCOTROUGH 15.5     RANDOM:  No results for input(s): VANCORANDOM in the last 72 hours. Assessment:  · WBC and temperature: WNL; Afebrile. · SCr, BUN, and urine output: stable  · Day(s) of therapy: #6  · Vancomycin level: 15.5 mcg/mL on 06-16-19. Plan:.  · Plan to continue Ms. Pope on current dosing: vancomycin 1750 mg q12h IVPB. · Repeat trough 06-20-19 @ 1530. · Most recent/repeat blood cultures negative (originally concerned for bacteremia). · Vancomycin can be de-escalated given MSSA seen on wound and surgical cultures if appropriate per clinical course. · Pharmacy will continue to monitor patient and adjust therapy as indicated. Thank you for the consult.     [facility]

## 2019-06-19 NOTE — CARE COORDINATION
CM into see pt to initiate a safe discharge plan. Cm into see, introduced self and explained role of CM. Pt is alone in room. Pt is kind, alert and oriented. Pt lives with her mother in a one floor home. Pt shared that they each other. Pt DME includes a walker, shower chr and  BSC. Pt has a PCP. Pt is able to drive. Pt has insurance and able to obtain her prescriptions. Pt shared that she has a very supportive brother if needed. Cm discussed discharge options. Pt would like [facility] to follow her when discharged. CM placed a PS to [facility] with [facility] with the referral.  CM provided card and encouraged to call for any needs or concern. CM is available if any needs arise.

## 2019-06-19 NOTE — OP NOTE
OPERATIVE / PROCEDURE NOTE    Andre Pope, 1970, 46 y.o.,  female, CSN: [id number]  June 19, 2019    PRE-OP DIAGNOSIS: GERD / Obesity / 4 months s/p lap sleeve gastrectomy. POST-OP DIAGNOSIS: Same + Per the EGD performed all the way to the 3rd portion of the duodenum:  - Z-line was @ 38 cm from the superior incisors' level  - Mild GERD stigmata noted, no obvious Hiatal Hernia, no changes suspicious Marsh's  - No gastritis  - No peptic ulcer disease  - Some biliary reflux  - NOT ENLARGED gastric sleeve    PROCEDURE(S):   - EGD to the 3rd portion of the duodenum    SURGEON(S):   Rissa Souza M.D., F.A.C.S., F.I.C.S. ASSISTANT(S): NONE    ANESTHESIA: MAC per Anaesthesia. SPECIMENS: None. ESTIMATED BLOOD LOSS: None. COMPLICATIONS: NONE. CONDITION / DISPOSITION: Stable, to PACU. OPERATIVE DESCRIPTION: After properly informed consent was signed by the  patient, knowing all the risks, benefits, potential complications and  possible alternatives of the procedure, being overweight and having  some GERD symptoms. The patient who underwent a sleeve gastrectomy 3 yrs ago  was properly identified. She was brought to the GI suite and after  institution of general IV sedation in reverse Trendelenburg position, in the  left lateral decubitus position, the EGD Olympus scope was placed into her  mouth and under direct visualization was advanced. No reflux or laryngitis  stigmata noted. The esophagus was intubated. The Z-line was noted to be at  38 cm from the superior incisor level. No significant GERD stigmata noted. No   hiatal hernia was identified. No changes suspicious for Marsh esophagitis. The stomach was intubated. No gastritis was noted and no ulcers noted. No significant gastritis noted. No  tumors, no growths, no peptic ulcer disease. The pylorus was intubated and  the scope was advanced all the way to the third portion of the duodenum.  No  postpyloric abnormalities identified,

## 2019-06-20 LAB
CULTURE: NORMAL
HCT VFR BLD CALC: 29.4 % (ref 37–47)
HEMOGLOBIN: 8.7 GM/DL (ref 12.5–16)
Lab: NORMAL
MCH RBC QN AUTO: 24.2 PG (ref 27–31)
MCHC RBC AUTO-ENTMCNC: 29.6 % (ref 32–36)
MCV RBC AUTO: 81.7 FL (ref 78–100)
PDW BLD-RTO: 14.2 % (ref 11.7–14.9)
PLATELET # BLD: 232 K/CU MM (ref 140–440)
PMV BLD AUTO: 10.2 FL (ref 7.5–11.1)
RBC # BLD: 3.6 M/CU MM (ref 4.2–5.4)
SPECIMEN: NORMAL
WBC # BLD: 3.8 K/CU MM (ref 4–10.5)

## 2019-06-20 PROCEDURE — 87073 CULTURE BACTERIA ANAEROBIC: CPT

## 2019-06-20 PROCEDURE — 6370000000 HC RX 637 (ALT 250 FOR IP): Performed by: PODIATRIST

## 2019-06-20 PROCEDURE — 2580000003 HC RX 258: Performed by: FAMILY MEDICINE

## 2019-06-20 PROCEDURE — 6370000000 HC RX 637 (ALT 250 FOR IP): Performed by: FAMILY MEDICINE

## 2019-06-20 PROCEDURE — 94761 N-INVAS EAR/PLS OXIMETRY MLT: CPT

## 2019-06-20 PROCEDURE — 85027 COMPLETE CBC AUTOMATED: CPT

## 2019-06-20 PROCEDURE — 6360000002 HC RX W HCPCS: Performed by: PODIATRIST

## 2019-06-20 PROCEDURE — 99232 SBSQ HOSP IP/OBS MODERATE 35: CPT | Performed by: SURGERY

## 2019-06-20 PROCEDURE — 6360000002 HC RX W HCPCS: Performed by: FAMILY MEDICINE

## 2019-06-20 PROCEDURE — 87071 CULTURE AEROBIC QUANT OTHER: CPT

## 2019-06-20 PROCEDURE — 1200000000 HC SEMI PRIVATE

## 2019-06-20 PROCEDURE — 6360000002 HC RX W HCPCS: Performed by: SURGERY

## 2019-06-20 RX ADMIN — MORPHINE SULFATE 4 MG: 4 INJECTION INTRAVENOUS at 11:20

## 2019-06-20 RX ADMIN — PROMETHAZINE HYDROCHLORIDE 6.25 MG: 25 INJECTION INTRAMUSCULAR; INTRAVENOUS at 15:34

## 2019-06-20 RX ADMIN — MORPHINE SULFATE 2 MG: 4 INJECTION INTRAVENOUS at 06:44

## 2019-06-20 RX ADMIN — VANCOMYCIN HYDROCHLORIDE 1750 MG: 5 INJECTION, POWDER, LYOPHILIZED, FOR SOLUTION INTRAVENOUS at 04:08

## 2019-06-20 RX ADMIN — PROMETHAZINE HYDROCHLORIDE 6.25 MG: 25 INJECTION INTRAMUSCULAR; INTRAVENOUS at 00:20

## 2019-06-20 RX ADMIN — PANTOPRAZOLE SODIUM 40 MG: 40 TABLET, DELAYED RELEASE ORAL at 16:41

## 2019-06-20 RX ADMIN — LEVOTHYROXINE SODIUM 125 MCG: 125 TABLET ORAL at 06:42

## 2019-06-20 RX ADMIN — PAROXETINE 30 MG: 10 TABLET, FILM COATED ORAL at 20:28

## 2019-06-20 RX ADMIN — PROMETHAZINE HYDROCHLORIDE 6.25 MG: 25 INJECTION INTRAMUSCULAR; INTRAVENOUS at 06:43

## 2019-06-20 RX ADMIN — GABAPENTIN 800 MG: 400 CAPSULE ORAL at 10:05

## 2019-06-20 RX ADMIN — CIPROFLOXACIN HYDROCHLORIDE 1 DROP: 3 SOLUTION/ DROPS OPHTHALMIC at 10:08

## 2019-06-20 RX ADMIN — CIPROFLOXACIN HYDROCHLORIDE 1 DROP: 3 SOLUTION/ DROPS OPHTHALMIC at 18:08

## 2019-06-20 RX ADMIN — GABAPENTIN 800 MG: 400 CAPSULE ORAL at 12:39

## 2019-06-20 RX ADMIN — SODIUM CHLORIDE, PRESERVATIVE FREE 10 ML: 5 INJECTION INTRAVENOUS at 20:28

## 2019-06-20 RX ADMIN — CIPROFLOXACIN HYDROCHLORIDE 1 DROP: 3 SOLUTION/ DROPS OPHTHALMIC at 21:46

## 2019-06-20 RX ADMIN — ZOLPIDEM TARTRATE 5 MG: 5 TABLET ORAL at 22:15

## 2019-06-20 RX ADMIN — GUAIFENESIN 600 MG: 600 TABLET, EXTENDED RELEASE ORAL at 10:06

## 2019-06-20 RX ADMIN — ESTRADIOL 0.5 MG: 1 TABLET ORAL at 10:06

## 2019-06-20 RX ADMIN — SODIUM CHLORIDE, PRESERVATIVE FREE 10 ML: 5 INJECTION INTRAVENOUS at 09:00

## 2019-06-20 RX ADMIN — MORPHINE SULFATE 4 MG: 4 INJECTION INTRAVENOUS at 20:28

## 2019-06-20 RX ADMIN — CLONIDINE HYDROCHLORIDE 0.1 MG: 0.1 TABLET ORAL at 20:28

## 2019-06-20 RX ADMIN — MORPHINE SULFATE 4 MG: 4 INJECTION INTRAVENOUS at 00:20

## 2019-06-20 RX ADMIN — DICYCLOMINE HYDROCHLORIDE 20 MG: 20 TABLET ORAL at 12:39

## 2019-06-20 RX ADMIN — PANTOPRAZOLE SODIUM 40 MG: 40 TABLET, DELAYED RELEASE ORAL at 06:42

## 2019-06-20 RX ADMIN — DICYCLOMINE HYDROCHLORIDE 20 MG: 20 TABLET ORAL at 06:42

## 2019-06-20 RX ADMIN — DICYCLOMINE HYDROCHLORIDE 20 MG: 20 TABLET ORAL at 18:08

## 2019-06-20 RX ADMIN — VANCOMYCIN HYDROCHLORIDE 1750 MG: 5 INJECTION, POWDER, LYOPHILIZED, FOR SOLUTION INTRAVENOUS at 16:41

## 2019-06-20 RX ADMIN — FLUTICASONE PROPIONATE 1 SPRAY: 50 SPRAY, METERED NASAL at 10:08

## 2019-06-20 RX ADMIN — CIPROFLOXACIN HYDROCHLORIDE 1 DROP: 3 SOLUTION/ DROPS OPHTHALMIC at 06:42

## 2019-06-20 RX ADMIN — ATENOLOL 25 MG: 25 TABLET ORAL at 12:39

## 2019-06-20 RX ADMIN — GUAIFENESIN 600 MG: 600 TABLET, EXTENDED RELEASE ORAL at 20:28

## 2019-06-20 RX ADMIN — ENOXAPARIN SODIUM 40 MG: 40 INJECTION SUBCUTANEOUS at 10:06

## 2019-06-20 RX ADMIN — HYDROCODONE BITARTRATE AND ACETAMINOPHEN 1 TABLET: 7.5; 325 TABLET ORAL at 04:48

## 2019-06-20 RX ADMIN — MORPHINE SULFATE 4 MG: 4 INJECTION INTRAVENOUS at 15:34

## 2019-06-20 RX ADMIN — HYDROCODONE BITARTRATE AND ACETAMINOPHEN 1 TABLET: 7.5; 325 TABLET ORAL at 23:27

## 2019-06-20 RX ADMIN — GABAPENTIN 800 MG: 400 CAPSULE ORAL at 20:28

## 2019-06-20 RX ADMIN — PROMETHAZINE HYDROCHLORIDE 6.25 MG: 25 INJECTION INTRAMUSCULAR; INTRAVENOUS at 22:15

## 2019-06-20 RX ADMIN — DICYCLOMINE HYDROCHLORIDE 20 MG: 20 TABLET ORAL at 00:20

## 2019-06-20 RX ADMIN — CIPROFLOXACIN HYDROCHLORIDE 1 DROP: 3 SOLUTION/ DROPS OPHTHALMIC at 12:40

## 2019-06-20 ASSESSMENT — PAIN SCALES - GENERAL
PAINLEVEL_OUTOF10: 6
PAINLEVEL_OUTOF10: 7
PAINLEVEL_OUTOF10: 7
PAINLEVEL_OUTOF10: 5
PAINLEVEL_OUTOF10: 8
PAINLEVEL_OUTOF10: 9
PAINLEVEL_OUTOF10: 3
PAINLEVEL_OUTOF10: 7
PAINLEVEL_OUTOF10: 6
PAINLEVEL_OUTOF10: 7

## 2019-06-20 ASSESSMENT — PAIN DESCRIPTION - ONSET
ONSET: ON-GOING

## 2019-06-20 ASSESSMENT — PAIN - FUNCTIONAL ASSESSMENT: PAIN_FUNCTIONAL_ASSESSMENT: ACTIVITIES ARE NOT PREVENTED

## 2019-06-20 ASSESSMENT — PAIN DESCRIPTION - LOCATION
LOCATION: FOOT

## 2019-06-20 ASSESSMENT — PAIN DESCRIPTION - FREQUENCY
FREQUENCY: CONTINUOUS

## 2019-06-20 ASSESSMENT — PAIN DESCRIPTION - DESCRIPTORS
DESCRIPTORS: ACHING

## 2019-06-20 ASSESSMENT — PAIN DESCRIPTION - PROGRESSION
CLINICAL_PROGRESSION: GRADUALLY WORSENING
CLINICAL_PROGRESSION: NOT CHANGED
CLINICAL_PROGRESSION: GRADUALLY IMPROVING

## 2019-06-20 ASSESSMENT — PAIN DESCRIPTION - ORIENTATION
ORIENTATION: LEFT

## 2019-06-20 ASSESSMENT — PAIN DESCRIPTION - PAIN TYPE
TYPE: ACUTE PAIN

## 2019-06-20 NOTE — PROGRESS NOTES
[address] [facility]  consulted by Dr. Maynor Nagy for monitoring and adjustment. Indication for treatment: infected foot abscess, left foot osteomyelitis  Goal trough: 15 mcg/mL     Pertinent Laboratory Values:   Temp Readings from Last 3 Encounters:   06/20/19 97.7 °F (36.5 °C) (Oral)   06/16/19 98.6 °F (37 °C)   06/13/19 98.3 °F (36.8 °C) (Oral)     Recent Labs     06/18/19  1345 06/20/19  0400   WBC 4.3 3.8*     Recent Labs     06/19/19  0414   BUN 11   CREATININE 0.7     Estimated Creatinine Clearance: 126 mL/min (based on SCr of 0.7 mg/dL). Intake/Output Summary (Last 24 hours) at 6/20/2019 1619  Last data filed at 6/20/2019 2338  Gross per 24 hour   Intake 1190 ml   Output --   Net 1190 ml       Pertinent Cultures:  Date    Source    Results  6/13   Wound    MSSA  6/14   Blood    Negative  6/16   Surgical   MSSA    Vancomycin level:   TROUGH:    No results for input(s): VANCOTROUGH in the last 72 hours. RANDOM:  No results for input(s): VANCORANDOM in the last 72 hours. Assessment:  · WBC and temperature: WBC now low @3.8; Afebrile. · SCr, BUN, and urine output: has been normal, no output data  · Day(s) of therapy: #7  · Vancomycin level: 15.5 mcg/mL on 06-16-19. · Pt noted as improving    Plan:.  · Plan to continue Ms. Pope on current dosing: vancomycin 1750 mg q12h IVPB. · Repeat trough today 06-20-19 @ 1530 has not been drawn yet  · Most recent/repeat blood cultures negative (originally concerned for bacteremia). · Vancomycin can be de-escalated given MSSA seen on wound and surgical cultures if appropriate per clinical course. · Pharmacy will continue to monitor patient and adjust therapy as indicated. Thank you for the consult. Nannette Babinski Relda Marian

## 2019-06-20 NOTE — PROGRESS NOTES
Attending Progress Note      PCP: Kennedi Fox MD    Date of Admission: 6/14/2019    Chief Complaint:   Chief Complaint   Patient presents with    Other     positive blood cultures           Subjective: pain at left  foot . Cherie Ani Both eyes better . Cherie Ani No  irritation/secretions . Cherie Ani Stated that surgical site with slightly oozing ,, nurse aware , podiatrist round in PM   No fever/chills , no N/V/diarrhea     Medications:  Reviewed  Infusion Medications     Scheduled Medications    ciprofloxacin  1 drop Both Eyes Q4H While awake    guaiFENesin  600 mg Oral BID    fluticasone  1 spray Each Nostril Daily    atenolol  25 mg Oral Daily    cloNIDine  0.1 mg Oral BID    estradiol  0.5 mg Oral Daily    dicyclomine  20 mg Oral Q6H    gabapentin  800 mg Oral TID    levothyroxine  125 mcg Oral Daily    pantoprazole  40 mg Oral BID AC    PARoxetine  30 mg Oral Nightly    sodium chloride flush  10 mL Intravenous 2 times per day    enoxaparin  40 mg Subcutaneous Daily    vancomycin  1,750 mg Intravenous Q12H     PRN Meds: ondansetron, promethazine, HYDROcodone-acetaminophen, morphine **OR** morphine, zolpidem, sodium chloride flush, magnesium hydroxide, acetaminophen      Intake/Output Summary (Last 24 hours) at 6/20/2019 1247  Last data filed at 6/20/2019 1982  Gross per 24 hour   Intake 1200 ml   Output --   Net 1200 ml       Exam:  BP (!) 126/59   Pulse 60   Temp 97.7 °F (36.5 °C) (Oral)   Resp 16   Ht 5' 4\" (1.626 m)   Wt 275 lb 5.7 oz (124.9 kg)   SpO2 94%   BMI 47.26 kg/m²   General appearance: No distress, appears stated age and cooperative. Respiratory:  symmetrical , good air entry , no Rales , No wheezing, or rhonchi,  Cardiovascular: RRR, with normal S1/S2 without murmurs. Abdomen : Soft, non-tender, non-distended  , normal bowel sounds. Musculoskelatal: left foot wrapped  No edema bilaterally.  No DVT signs ,  Full range of motion   Neurologic:  Alert and oriented , no focal sensory/motor deficits , grossly non-focal.      Labs:   Recent Labs     06/18/19  1345 06/20/19  0400   WBC 4.3 3.8*   HGB 8.7* 8.7*   HCT 29.6* 29.4*    232     Recent Labs     06/19/19  0414   BUN 11   CREATININE 0.7     No results for input(s): AST, ALT, BILIDIR, BILITOT, ALKPHOS in the last 72 hours. No results for input(s): INR in the last 72 hours. No results for input(s): Faby Pickler in the last 72 hours. Assessment/Plan:    Active Hospital Problems    Diagnosis Date Noted    Acute conjunctivitis [H10.30] 06/18/2019     Priority: Medium    Bacteremia [R78.81] 06/15/2019   left foot  osteomylitis , s/p OR   hypothyroidism   Anemia   H/op drug abuse   bilateral t conjunctivitis      EGD done . Artist Hazy No acute finding . Artist Hazy Artist Hazy Artist Hazy H/H stable . Artist Hazy Still  8.7 .. Cont IV Vanco .. Blood c/s: neg . . surg c/s: staph aureus   Cont ciprofloxacin both eyes   Anemia : monitor h/h .. 8.7  . Seizure vs pseudo . Artist Hazy Head CT : normal .. consider EEG as outpt   Cont  Ambien   Consultants input noted     DVT Prophylaxis: lovenox  Diet: DIET GENERAL;  Code Status: Full Code    Treatment progress and plan was d/w pt/family .         Charly Anaya MD

## 2019-06-21 LAB
BUN BLDV-MCNC: 15 MG/DL (ref 6–23)
CREAT SERPL-MCNC: 0.7 MG/DL (ref 0.6–1.1)
DOSE AMOUNT: ABNORMAL
DOSE TIME: ABNORMAL
GFR AFRICAN AMERICAN: >60 ML/MIN/1.73M2
GFR NON-AFRICAN AMERICAN: >60 ML/MIN/1.73M2
VANCOMYCIN TROUGH: 23.8 UG/ML (ref 10–20)

## 2019-06-21 PROCEDURE — 1200000000 HC SEMI PRIVATE

## 2019-06-21 PROCEDURE — 6360000002 HC RX W HCPCS: Performed by: SURGERY

## 2019-06-21 PROCEDURE — 99232 SBSQ HOSP IP/OBS MODERATE 35: CPT | Performed by: SURGERY

## 2019-06-21 PROCEDURE — 84520 ASSAY OF UREA NITROGEN: CPT

## 2019-06-21 PROCEDURE — 80202 ASSAY OF VANCOMYCIN: CPT

## 2019-06-21 PROCEDURE — 6360000002 HC RX W HCPCS: Performed by: FAMILY MEDICINE

## 2019-06-21 PROCEDURE — 6370000000 HC RX 637 (ALT 250 FOR IP): Performed by: FAMILY MEDICINE

## 2019-06-21 PROCEDURE — 82565 ASSAY OF CREATININE: CPT

## 2019-06-21 PROCEDURE — 6370000000 HC RX 637 (ALT 250 FOR IP): Performed by: SURGERY

## 2019-06-21 PROCEDURE — 2580000003 HC RX 258: Performed by: FAMILY MEDICINE

## 2019-06-21 PROCEDURE — 6370000000 HC RX 637 (ALT 250 FOR IP): Performed by: PODIATRIST

## 2019-06-21 PROCEDURE — 6360000002 HC RX W HCPCS: Performed by: PODIATRIST

## 2019-06-21 PROCEDURE — 94761 N-INVAS EAR/PLS OXIMETRY MLT: CPT

## 2019-06-21 RX ORDER — FERROUS SULFATE 300 MG/5ML
300 LIQUID (ML) ORAL 2 TIMES DAILY
Status: DISCONTINUED | OUTPATIENT
Start: 2019-06-21 | End: 2019-06-26 | Stop reason: HOSPADM

## 2019-06-21 RX ORDER — FERROUS SULFATE 300 MG/5ML
300 LIQUID (ML) ORAL DAILY
Status: DISCONTINUED | OUTPATIENT
Start: 2019-06-21 | End: 2019-06-21

## 2019-06-21 RX ORDER — SENNA AND DOCUSATE SODIUM 50; 8.6 MG/1; MG/1
2 TABLET, FILM COATED ORAL 2 TIMES DAILY
Status: DISCONTINUED | OUTPATIENT
Start: 2019-06-21 | End: 2019-06-26 | Stop reason: HOSPADM

## 2019-06-21 RX ADMIN — ACETAMINOPHEN 650 MG: 325 TABLET ORAL at 12:41

## 2019-06-21 RX ADMIN — DICYCLOMINE HYDROCHLORIDE 20 MG: 20 TABLET ORAL at 12:41

## 2019-06-21 RX ADMIN — MORPHINE SULFATE 4 MG: 4 INJECTION INTRAVENOUS at 22:52

## 2019-06-21 RX ADMIN — CIPROFLOXACIN HYDROCHLORIDE 1 DROP: 3 SOLUTION/ DROPS OPHTHALMIC at 17:43

## 2019-06-21 RX ADMIN — MORPHINE SULFATE 4 MG: 4 INJECTION INTRAVENOUS at 14:08

## 2019-06-21 RX ADMIN — SENNOSIDES AND DOCUSATE SODIUM 2 TABLET: 8.6; 5 TABLET ORAL at 10:24

## 2019-06-21 RX ADMIN — PROMETHAZINE HYDROCHLORIDE 6.25 MG: 25 INJECTION INTRAMUSCULAR; INTRAVENOUS at 05:39

## 2019-06-21 RX ADMIN — CIPROFLOXACIN HYDROCHLORIDE 1 DROP: 3 SOLUTION/ DROPS OPHTHALMIC at 05:43

## 2019-06-21 RX ADMIN — GABAPENTIN 800 MG: 400 CAPSULE ORAL at 10:24

## 2019-06-21 RX ADMIN — PROMETHAZINE HYDROCHLORIDE 6.25 MG: 25 INJECTION INTRAMUSCULAR; INTRAVENOUS at 14:09

## 2019-06-21 RX ADMIN — MORPHINE SULFATE 4 MG: 4 INJECTION INTRAVENOUS at 10:32

## 2019-06-21 RX ADMIN — SENNOSIDES AND DOCUSATE SODIUM 2 TABLET: 8.6; 5 TABLET ORAL at 21:22

## 2019-06-21 RX ADMIN — SODIUM CHLORIDE, PRESERVATIVE FREE 10 ML: 5 INJECTION INTRAVENOUS at 10:25

## 2019-06-21 RX ADMIN — DICYCLOMINE HYDROCHLORIDE 20 MG: 20 TABLET ORAL at 01:13

## 2019-06-21 RX ADMIN — MINERAL SUPPLEMENT IRON 300 MG / 5 ML STRENGTH LIQUID 100 PER BOX UNFLAVORED 300 MG: at 21:22

## 2019-06-21 RX ADMIN — HYDROCODONE BITARTRATE AND ACETAMINOPHEN 1 TABLET: 7.5; 325 TABLET ORAL at 03:24

## 2019-06-21 RX ADMIN — MORPHINE SULFATE 4 MG: 4 INJECTION INTRAVENOUS at 05:40

## 2019-06-21 RX ADMIN — DICYCLOMINE HYDROCHLORIDE 20 MG: 20 TABLET ORAL at 17:43

## 2019-06-21 RX ADMIN — PAROXETINE 30 MG: 10 TABLET, FILM COATED ORAL at 21:22

## 2019-06-21 RX ADMIN — LEVOTHYROXINE SODIUM 125 MCG: 125 TABLET ORAL at 05:40

## 2019-06-21 RX ADMIN — VANCOMYCIN HYDROCHLORIDE 1750 MG: 5 INJECTION, POWDER, LYOPHILIZED, FOR SOLUTION INTRAVENOUS at 04:02

## 2019-06-21 RX ADMIN — ENOXAPARIN SODIUM 40 MG: 40 INJECTION SUBCUTANEOUS at 10:23

## 2019-06-21 RX ADMIN — CIPROFLOXACIN HYDROCHLORIDE 1 DROP: 3 SOLUTION/ DROPS OPHTHALMIC at 21:25

## 2019-06-21 RX ADMIN — DICYCLOMINE HYDROCHLORIDE 20 MG: 20 TABLET ORAL at 05:40

## 2019-06-21 RX ADMIN — GABAPENTIN 800 MG: 400 CAPSULE ORAL at 21:23

## 2019-06-21 RX ADMIN — MORPHINE SULFATE 4 MG: 4 INJECTION INTRAVENOUS at 18:13

## 2019-06-21 RX ADMIN — GUAIFENESIN 600 MG: 600 TABLET, EXTENDED RELEASE ORAL at 10:22

## 2019-06-21 RX ADMIN — HYDROCODONE BITARTRATE AND ACETAMINOPHEN 1 TABLET: 7.5; 325 TABLET ORAL at 21:28

## 2019-06-21 RX ADMIN — MINERAL SUPPLEMENT IRON 300 MG / 5 ML STRENGTH LIQUID 100 PER BOX UNFLAVORED 300 MG: at 10:23

## 2019-06-21 RX ADMIN — GABAPENTIN 800 MG: 400 CAPSULE ORAL at 12:41

## 2019-06-21 RX ADMIN — VANCOMYCIN HYDROCHLORIDE 1500 MG: 5 INJECTION, POWDER, LYOPHILIZED, FOR SOLUTION INTRAVENOUS at 22:10

## 2019-06-21 RX ADMIN — GUAIFENESIN 600 MG: 600 TABLET, EXTENDED RELEASE ORAL at 21:23

## 2019-06-21 RX ADMIN — PROMETHAZINE HYDROCHLORIDE 6.25 MG: 25 INJECTION INTRAMUSCULAR; INTRAVENOUS at 22:52

## 2019-06-21 RX ADMIN — ESTRADIOL 0.5 MG: 1 TABLET ORAL at 10:22

## 2019-06-21 RX ADMIN — PANTOPRAZOLE SODIUM 40 MG: 40 TABLET, DELAYED RELEASE ORAL at 05:40

## 2019-06-21 RX ADMIN — MORPHINE SULFATE 4 MG: 4 INJECTION INTRAVENOUS at 01:13

## 2019-06-21 RX ADMIN — CIPROFLOXACIN HYDROCHLORIDE 1 DROP: 3 SOLUTION/ DROPS OPHTHALMIC at 10:26

## 2019-06-21 RX ADMIN — CLONIDINE HYDROCHLORIDE 0.1 MG: 0.1 TABLET ORAL at 21:22

## 2019-06-21 RX ADMIN — CIPROFLOXACIN HYDROCHLORIDE 1 DROP: 3 SOLUTION/ DROPS OPHTHALMIC at 12:41

## 2019-06-21 RX ADMIN — PANTOPRAZOLE SODIUM 40 MG: 40 TABLET, DELAYED RELEASE ORAL at 14:16

## 2019-06-21 ASSESSMENT — PAIN SCALES - GENERAL
PAINLEVEL_OUTOF10: 4
PAINLEVEL_OUTOF10: 7
PAINLEVEL_OUTOF10: 4
PAINLEVEL_OUTOF10: 6
PAINLEVEL_OUTOF10: 6
PAINLEVEL_OUTOF10: 7
PAINLEVEL_OUTOF10: 9
PAINLEVEL_OUTOF10: 7
PAINLEVEL_OUTOF10: 3
PAINLEVEL_OUTOF10: 7

## 2019-06-21 ASSESSMENT — PAIN DESCRIPTION - LOCATION
LOCATION: FOOT

## 2019-06-21 ASSESSMENT — PAIN DESCRIPTION - PAIN TYPE
TYPE: ACUTE PAIN

## 2019-06-21 ASSESSMENT — PAIN DESCRIPTION - FREQUENCY
FREQUENCY: CONTINUOUS

## 2019-06-21 ASSESSMENT — PAIN DESCRIPTION - DESCRIPTORS
DESCRIPTORS: ACHING

## 2019-06-21 ASSESSMENT — PAIN DESCRIPTION - ONSET
ONSET: ON-GOING

## 2019-06-21 ASSESSMENT — PAIN DESCRIPTION - PROGRESSION
CLINICAL_PROGRESSION: GRADUALLY IMPROVING

## 2019-06-21 ASSESSMENT — PAIN DESCRIPTION - ORIENTATION
ORIENTATION: LEFT

## 2019-06-21 NOTE — CONSULTS
[address], [address] [facility]                                  CONSULTATION    PATIENT NAME: Marlee Chavez                  :        1970  MED REC NO:   [id number]                          ROOM:       4108  ACCOUNT NO:   [de-identified]                           ADMIT DATE: 2019  PROVIDER:     Tre Zepeda DPM    CONSULT DATE:  2019    HISTORY OF PRESENT ILLNESS:  The patient is a very pleasant 25-year-old  female who is status post procedure on the left foot for resection of  bone infection from sequestrum/involucrum on the left first metatarsal.   The patient is doing well, states that she has had some pain to the  area. Denies any vomiting, fever or chills at this time. The patient  otherwise appears to be doing good. The patient's vitals show a  temperature is 97.9, respiration 18, pulse 55, blood pressure 112/54. Labs show that her most recent white count was 3.8. PHYSICAL EXAMINATION:  The patient's physical examination shows that she  has just a little bit of drainage to the central portion of the  incision. It appears to be serosanguineous. I did culture it and send  it for evaluation. Otherwise, the incision site is well maintained. There is no significant redness or cellulitis to the area. No  dehiscence of the wound noted at this point. Good appearance to the  surgical site. ASSESSMENT:  Status post resection of bone, infection, osteomyelitis of  the left first metatarsal and excision of sinus tract formation. TREATMENT:  The patient at this time is educated about her condition. We did take the culture and send it for evaluation. The patient has a  dry and clean dressing put into place.   I want her to stay here for the  next day or so until we get that final culture back and then make a  decision on what antibiotics to discharge her with, whether the 6 weeks  of IV

## 2019-06-22 LAB
HCT VFR BLD CALC: 32.3 % (ref 37–47)
HEMOGLOBIN: 9.5 GM/DL (ref 12.5–16)
MCH RBC QN AUTO: 24 PG (ref 27–31)
MCHC RBC AUTO-ENTMCNC: 29.4 % (ref 32–36)
MCV RBC AUTO: 81.6 FL (ref 78–100)
PDW BLD-RTO: 13.9 % (ref 11.7–14.9)
PLATELET # BLD: 236 K/CU MM (ref 140–440)
PMV BLD AUTO: 9.6 FL (ref 7.5–11.1)
RBC # BLD: 3.96 M/CU MM (ref 4.2–5.4)
WBC # BLD: 4.9 K/CU MM (ref 4–10.5)

## 2019-06-22 PROCEDURE — 2580000003 HC RX 258: Performed by: FAMILY MEDICINE

## 2019-06-22 PROCEDURE — 6370000000 HC RX 637 (ALT 250 FOR IP): Performed by: PODIATRIST

## 2019-06-22 PROCEDURE — 6370000000 HC RX 637 (ALT 250 FOR IP): Performed by: FAMILY MEDICINE

## 2019-06-22 PROCEDURE — 6360000002 HC RX W HCPCS: Performed by: FAMILY MEDICINE

## 2019-06-22 PROCEDURE — 6360000002 HC RX W HCPCS: Performed by: PODIATRIST

## 2019-06-22 PROCEDURE — 94761 N-INVAS EAR/PLS OXIMETRY MLT: CPT

## 2019-06-22 PROCEDURE — 1200000000 HC SEMI PRIVATE

## 2019-06-22 PROCEDURE — 6370000000 HC RX 637 (ALT 250 FOR IP): Performed by: SURGERY

## 2019-06-22 PROCEDURE — 6360000002 HC RX W HCPCS: Performed by: SURGERY

## 2019-06-22 PROCEDURE — 85027 COMPLETE CBC AUTOMATED: CPT

## 2019-06-22 RX ORDER — OXYCODONE HYDROCHLORIDE AND ACETAMINOPHEN 5; 325 MG/1; MG/1
1 TABLET ORAL EVERY 6 HOURS PRN
Status: DISCONTINUED | OUTPATIENT
Start: 2019-06-22 | End: 2019-06-26 | Stop reason: HOSPADM

## 2019-06-22 RX ORDER — HYDROMORPHONE HCL 110MG/55ML
0.5 PATIENT CONTROLLED ANALGESIA SYRINGE INTRAVENOUS
Status: DISCONTINUED | OUTPATIENT
Start: 2019-06-22 | End: 2019-06-26 | Stop reason: HOSPADM

## 2019-06-22 RX ADMIN — VANCOMYCIN HYDROCHLORIDE 1500 MG: 5 INJECTION, POWDER, LYOPHILIZED, FOR SOLUTION INTRAVENOUS at 20:00

## 2019-06-22 RX ADMIN — CLONIDINE HYDROCHLORIDE 0.1 MG: 0.1 TABLET ORAL at 20:05

## 2019-06-22 RX ADMIN — MINERAL SUPPLEMENT IRON 300 MG / 5 ML STRENGTH LIQUID 100 PER BOX UNFLAVORED 300 MG: at 08:31

## 2019-06-22 RX ADMIN — CIPROFLOXACIN HYDROCHLORIDE 1 DROP: 3 SOLUTION/ DROPS OPHTHALMIC at 15:12

## 2019-06-22 RX ADMIN — CIPROFLOXACIN HYDROCHLORIDE 1 DROP: 3 SOLUTION/ DROPS OPHTHALMIC at 08:31

## 2019-06-22 RX ADMIN — HYDROMORPHONE HYDROCHLORIDE 0.5 MG: 2 INJECTION, SOLUTION INTRAMUSCULAR; INTRAVENOUS; SUBCUTANEOUS at 21:34

## 2019-06-22 RX ADMIN — CIPROFLOXACIN HYDROCHLORIDE 1 DROP: 3 SOLUTION/ DROPS OPHTHALMIC at 05:37

## 2019-06-22 RX ADMIN — SODIUM CHLORIDE, PRESERVATIVE FREE 10 ML: 5 INJECTION INTRAVENOUS at 20:05

## 2019-06-22 RX ADMIN — HYDROCODONE BITARTRATE AND ACETAMINOPHEN 1 TABLET: 7.5; 325 TABLET ORAL at 02:21

## 2019-06-22 RX ADMIN — SENNOSIDES AND DOCUSATE SODIUM 2 TABLET: 8.6; 5 TABLET ORAL at 08:30

## 2019-06-22 RX ADMIN — ENOXAPARIN SODIUM 40 MG: 40 INJECTION SUBCUTANEOUS at 08:31

## 2019-06-22 RX ADMIN — LEVOTHYROXINE SODIUM 125 MCG: 125 TABLET ORAL at 05:36

## 2019-06-22 RX ADMIN — CIPROFLOXACIN HYDROCHLORIDE 1 DROP: 3 SOLUTION/ DROPS OPHTHALMIC at 20:05

## 2019-06-22 RX ADMIN — PAROXETINE 30 MG: 10 TABLET, FILM COATED ORAL at 20:05

## 2019-06-22 RX ADMIN — ONDANSETRON 8 MG: 2 INJECTION INTRAMUSCULAR; INTRAVENOUS at 02:21

## 2019-06-22 RX ADMIN — DICYCLOMINE HYDROCHLORIDE 20 MG: 20 TABLET ORAL at 05:36

## 2019-06-22 RX ADMIN — HYDROMORPHONE HYDROCHLORIDE 0.5 MG: 2 INJECTION, SOLUTION INTRAMUSCULAR; INTRAVENOUS; SUBCUTANEOUS at 17:41

## 2019-06-22 RX ADMIN — CLONIDINE HYDROCHLORIDE 0.1 MG: 0.1 TABLET ORAL at 08:30

## 2019-06-22 RX ADMIN — VANCOMYCIN HYDROCHLORIDE 1500 MG: 5 INJECTION, POWDER, LYOPHILIZED, FOR SOLUTION INTRAVENOUS at 11:16

## 2019-06-22 RX ADMIN — ATENOLOL 25 MG: 25 TABLET ORAL at 08:30

## 2019-06-22 RX ADMIN — OXYCODONE HYDROCHLORIDE AND ACETAMINOPHEN 1 TABLET: 5; 325 TABLET ORAL at 20:05

## 2019-06-22 RX ADMIN — PANTOPRAZOLE SODIUM 40 MG: 40 TABLET, DELAYED RELEASE ORAL at 05:36

## 2019-06-22 RX ADMIN — SODIUM CHLORIDE, PRESERVATIVE FREE 10 ML: 5 INJECTION INTRAVENOUS at 08:31

## 2019-06-22 RX ADMIN — ZOLPIDEM TARTRATE 5 MG: 5 TABLET ORAL at 23:23

## 2019-06-22 RX ADMIN — HYDROCODONE BITARTRATE AND ACETAMINOPHEN 1 TABLET: 7.5; 325 TABLET ORAL at 12:35

## 2019-06-22 RX ADMIN — HYDROCODONE BITARTRATE AND ACETAMINOPHEN 1 TABLET: 7.5; 325 TABLET ORAL at 08:22

## 2019-06-22 RX ADMIN — GABAPENTIN 800 MG: 400 CAPSULE ORAL at 20:05

## 2019-06-22 RX ADMIN — ESTRADIOL 0.5 MG: 1 TABLET ORAL at 08:30

## 2019-06-22 RX ADMIN — PROMETHAZINE HYDROCHLORIDE 6.25 MG: 25 INJECTION INTRAMUSCULAR; INTRAVENOUS at 05:36

## 2019-06-22 RX ADMIN — PROMETHAZINE HYDROCHLORIDE 6.25 MG: 25 INJECTION INTRAMUSCULAR; INTRAVENOUS at 21:34

## 2019-06-22 RX ADMIN — DICYCLOMINE HYDROCHLORIDE 20 MG: 20 TABLET ORAL at 15:12

## 2019-06-22 RX ADMIN — DICYCLOMINE HYDROCHLORIDE 20 MG: 20 TABLET ORAL at 02:21

## 2019-06-22 RX ADMIN — CIPROFLOXACIN HYDROCHLORIDE 1 DROP: 3 SOLUTION/ DROPS OPHTHALMIC at 17:43

## 2019-06-22 RX ADMIN — GABAPENTIN 800 MG: 400 CAPSULE ORAL at 15:12

## 2019-06-22 RX ADMIN — FLUTICASONE PROPIONATE 1 SPRAY: 50 SPRAY, METERED NASAL at 08:31

## 2019-06-22 RX ADMIN — GUAIFENESIN 600 MG: 600 TABLET, EXTENDED RELEASE ORAL at 20:05

## 2019-06-22 RX ADMIN — GUAIFENESIN 600 MG: 600 TABLET, EXTENDED RELEASE ORAL at 08:30

## 2019-06-22 RX ADMIN — PANTOPRAZOLE SODIUM 40 MG: 40 TABLET, DELAYED RELEASE ORAL at 15:12

## 2019-06-22 RX ADMIN — MORPHINE SULFATE 4 MG: 4 INJECTION INTRAVENOUS at 15:13

## 2019-06-22 RX ADMIN — DICYCLOMINE HYDROCHLORIDE 20 MG: 20 TABLET ORAL at 17:41

## 2019-06-22 RX ADMIN — PROMETHAZINE HYDROCHLORIDE 6.25 MG: 25 INJECTION INTRAMUSCULAR; INTRAVENOUS at 15:13

## 2019-06-22 RX ADMIN — MORPHINE SULFATE 4 MG: 4 INJECTION INTRAVENOUS at 10:10

## 2019-06-22 RX ADMIN — MORPHINE SULFATE 4 MG: 4 INJECTION INTRAVENOUS at 05:36

## 2019-06-22 RX ADMIN — MINERAL SUPPLEMENT IRON 300 MG / 5 ML STRENGTH LIQUID 100 PER BOX UNFLAVORED 300 MG: at 20:05

## 2019-06-22 RX ADMIN — SENNOSIDES AND DOCUSATE SODIUM 2 TABLET: 8.6; 5 TABLET ORAL at 20:05

## 2019-06-22 RX ADMIN — GABAPENTIN 800 MG: 400 CAPSULE ORAL at 08:30

## 2019-06-22 ASSESSMENT — PAIN DESCRIPTION - LOCATION
LOCATION: FOOT;LEG
LOCATION: FOOT;LEG
LOCATION: FOOT
LOCATION: FOOT;LEG
LOCATION: FOOT;LEG
LOCATION: FOOT
LOCATION: FOOT
LOCATION: FOOT;LEG
LOCATION: FOOT

## 2019-06-22 ASSESSMENT — PAIN SCALES - WONG BAKER
WONGBAKER_NUMERICALRESPONSE: 0

## 2019-06-22 ASSESSMENT — PAIN DESCRIPTION - FREQUENCY
FREQUENCY: CONTINUOUS

## 2019-06-22 ASSESSMENT — PAIN SCALES - GENERAL
PAINLEVEL_OUTOF10: 8
PAINLEVEL_OUTOF10: 0
PAINLEVEL_OUTOF10: 8
PAINLEVEL_OUTOF10: 10
PAINLEVEL_OUTOF10: 7
PAINLEVEL_OUTOF10: 9
PAINLEVEL_OUTOF10: 0
PAINLEVEL_OUTOF10: 7
PAINLEVEL_OUTOF10: 9
PAINLEVEL_OUTOF10: 8

## 2019-06-22 ASSESSMENT — PAIN DESCRIPTION - ONSET
ONSET: ON-GOING

## 2019-06-22 ASSESSMENT — PAIN DESCRIPTION - PROGRESSION
CLINICAL_PROGRESSION: GRADUALLY WORSENING
CLINICAL_PROGRESSION: GRADUALLY IMPROVING
CLINICAL_PROGRESSION: GRADUALLY WORSENING
CLINICAL_PROGRESSION: GRADUALLY WORSENING
CLINICAL_PROGRESSION: NOT CHANGED
CLINICAL_PROGRESSION: GRADUALLY IMPROVING
CLINICAL_PROGRESSION: NOT CHANGED
CLINICAL_PROGRESSION: NOT CHANGED
CLINICAL_PROGRESSION: GRADUALLY WORSENING
CLINICAL_PROGRESSION: GRADUALLY IMPROVING
CLINICAL_PROGRESSION: RAPIDLY WORSENING
CLINICAL_PROGRESSION: GRADUALLY WORSENING
CLINICAL_PROGRESSION: GRADUALLY WORSENING
CLINICAL_PROGRESSION: NOT CHANGED
CLINICAL_PROGRESSION: GRADUALLY WORSENING
CLINICAL_PROGRESSION: GRADUALLY WORSENING

## 2019-06-22 ASSESSMENT — PAIN DESCRIPTION - PAIN TYPE
TYPE: ACUTE PAIN

## 2019-06-22 ASSESSMENT — PAIN DESCRIPTION - DESCRIPTORS
DESCRIPTORS: ACHING
DESCRIPTORS: ACHING
DESCRIPTORS: ACHING;THROBBING
DESCRIPTORS: ACHING
DESCRIPTORS: ACHING
DESCRIPTORS: ACHING;THROBBING

## 2019-06-22 ASSESSMENT — PAIN DESCRIPTION - ORIENTATION
ORIENTATION: LEFT

## 2019-06-22 NOTE — PROGRESS NOTES
Nutrition Assessment    Type and Reason for Visit: Initial(LOS)    Nutrition Recommendations:   · Continue current diet  · Provide oral wound healing supplement BID    Nutrition Assessment: Pt at moderate nutrition risk due to presence of wounds. Pt with hx of sleeve gastrectomy (2/12/2019) and is losing wt adequately per notes. Pt wt trending upwards during los. Pt consuming % of snacks and meals provided per flowsheet. Pt feeling better and infection is improving per notes. Will order wound healing supplement and continue to follow. Malnutrition Assessment:  · Malnutrition Status: At risk for malnutrition    Nutrition Risk Level:  Moderate    Nutrient Needs:  · Estimated Daily Total Kcal: 8175-5308 (per Suraj Cockayne)  · Estimated Daily Protein (g):  (1.5-2 gm/kg IBW)  · Estimated Daily Total Fluid (ml/day): 1786-2536 (1 mL/kcal)    Nutrition Diagnosis:   · Problem: Increased nutrient needs  · Etiology: related to Increased demand for energy/nutrients     Signs and symptoms:  as evidenced by Presence of wounds    Objective Information:  · Wound Type: Surgical Wound  · Current Nutrition Therapies:  · Oral Diet Orders: General   · Oral Diet intake: %  · Oral Nutrition Supplement (ONS) Orders: None  · Anthropometric Measures:  · Ht: 5' 4\" (162.6 cm)   · Current Body Wt: 280 lb (127 kg)  · Admission Body Wt: 262 lb (118.8 kg)  · % Weight Change:  -5% x 3 months  · Ideal Body Wt: 120 lb (54.4 kg), % Ideal Body 233%  · BMI Classification: BMI > or equal to 40.0 Obese Class III    Nutrition Interventions:   Continue current diet, Start ONS  Continued Inpatient Monitoring, Education Not Indicated, Coordination of Care    Nutrition Evaluation:   · Evaluation: Goals set   · Goals: Pt will consume at least 75% of all meals and supplements provided    · Monitoring: Meal Intake, Supplement Intake, Diet Tolerance, Weight, Pertinent Labs, Wound Healing      Electronically signed by Julianna Herrmann RD,

## 2019-06-22 NOTE — PLAN OF CARE
Problem: Pain:  Goal: Pain level will decrease  Description  Pain level will decrease  6/22/2019 1426 by La Schulz RN  Outcome: Ongoing  6/22/2019 0107 by Ole Herrera RN  Outcome: Ongoing  Goal: Control of acute pain  Description  Control of acute pain  6/22/2019 1426 by La Schulz RN  Outcome: Ongoing  6/22/2019 0107 by Ole Herrera RN  Outcome: Ongoing  Goal: Control of chronic pain  Description  Control of chronic pain  6/22/2019 1426 by La Schulz RN  Outcome: Ongoing  6/22/2019 0107 by Ole Herrera RN  Outcome: Ongoing     Problem: Nausea/Vomiting:  Goal: Absence of nausea/vomiting  Description  Absence of nausea/vomiting  6/22/2019 1426 by La Schulz RN  Outcome: Ongoing  6/22/2019 0107 by Ole Herrera RN  Outcome: Ongoing  Goal: Able to drink  Description  Able to drink  6/22/2019 1426 by La Schulz RN  Outcome: Ongoing  6/22/2019 0107 by Ole Herrera RN  Outcome: Ongoing  Goal: Able to eat  Description  Able to eat  6/22/2019 1426 by La Schulz RN  Outcome: Ongoing  6/22/2019 0107 by Ole Herrera RN  Outcome: Ongoing  Goal: Ability to achieve adequate nutritional intake will improve  Description  Ability to achieve adequate nutritional intake will improve  6/22/2019 1426 by La Schulz RN  Outcome: Ongoing  6/22/2019 0107 by Ole Herrera RN  Outcome: Ongoing     Problem: Nutrition  Goal: Optimal nutrition therapy  6/22/2019 1426 by La Schulz RN  Outcome: Ongoing  6/22/2019 1015 by Julianna Herrmann RD, LD  Outcome: Ongoing

## 2019-06-22 NOTE — PROGRESS NOTES
[address] [facility]  consulted by Dr. Trever Earl for monitoring and adjustment. Indication for treatment: infected foot abscess, left foot osteomyelitis  Goal trough: 15 mcg/mL     Pertinent Laboratory Values:   Temp Readings from Last 3 Encounters:   06/22/19 98.8 °F (37.1 °C) (Oral)   06/16/19 98.6 °F (37 °C)   06/13/19 98.3 °F (36.8 °C) (Oral)     Recent Labs     06/20/19  0400 06/22/19  1335   WBC 3.8* 4.9     Recent Labs     06/21/19  0542   BUN 15   CREATININE 0.7     Estimated Creatinine Clearance: 127 mL/min (based on SCr of 0.7 mg/dL). Intake/Output Summary (Last 24 hours) at 6/22/2019 1703  Last data filed at 6/22/2019 1340  Gross per 24 hour   Intake 500 ml   Output --   Net 500 ml       Pertinent Cultures:  Date    Source    Results  6/13   Wound    MSSA  6/14   Blood    Negative  6/16   Surgical   MSSA    Vancomycin level:   TROUGH:    Recent Labs     06/21/19  1428   VANCOTROUGH 23.8*     RANDOM:  No results for input(s): VANCORANDOM in the last 72 hours. Assessment:  · WBC and temperature: WBC WNL. Afebrile. · SCr, BUN, and urine output: Stable, WNL. · Day(s) of therapy: #9  · Vancomycin level: 23.8 mcg/mL - obtained yesterday. Plan:  · Renal labs have been stable. · Vancomycin dosing reduced given elevated trough level. · Plan to obtain next trough level: 06-23-19 @ 08:30.  · Most recent/repeat blood cultures are negative (originally concerned for bacteremia). · Vancomycin can be de-escalated given MSSA seen on wound and surgical cultures if appropriate per clinical course. · Pharmacy will continue to monitor patient and adjust therapy as indicated. Thank you for the consult.     Boris Barraza, MorrisD, [facility]

## 2019-06-22 NOTE — PLAN OF CARE
Nutrition Problem: Increased nutrient needs  Intervention: Food and/or Nutrient Delivery: Continue current diet, Start ONS  Nutritional Goals: Pt will consume at least 75% of all meals and supplements provided    Electronically signed by Eduin Wagner RD, LD on 6/22/2019 at 10:15 AM

## 2019-06-22 NOTE — PROGRESS NOTES
Left foot surgical wound cleaned and redressed. Small amount of purlebt drainage noted around stitches. Cleansed with normal saline, covered with aquacel AG, non adherent, abd pad, kerlix and ace wrap per Dr. Weiss Both telephone order. Pt tolerated well.

## 2019-06-22 NOTE — PROGRESS NOTES
non-focal.      Labs:   Recent Labs     06/20/19  0400   WBC 3.8*   HGB 8.7*   HCT 29.4*        Recent Labs     06/21/19  0542   BUN 15   CREATININE 0.7     No results for input(s): AST, ALT, BILIDIR, BILITOT, ALKPHOS in the last 72 hours. No results for input(s): INR in the last 72 hours. No results for input(s): Burnetta Nettle in the last 72 hours. Assessment/Plan:    Active Hospital Problems    Diagnosis Date Noted    Acute conjunctivitis [H10.30] 06/18/2019     Priority: Medium    Bacteremia [R78.81] 06/15/2019   left foot  osteomylitis , s/p OR   hypothyroidism   H/op drug abuse   bilateral t conjunctivitis    S/p gastric sleeve with Anemia     Pt asked to increase pain med s.. She on morphine 2 mg/4 mg Q2-4 hours , norco 7.5 Q 4 . Annamary Ripa Cont Vanco .. Pharmacy dosing . Annamary Ripa Annamary Ripa Blood c/s: neg . . surg c/s: staph aureus   Anemia EGD done . Annamary Ripa No acute finding . Annamary Ripa Annamary Ripa Annamary Ripa H/H stable . Annamary Ripa Steady at   8.7 .. Cont  Iron to BID   Cont ciprofloxacin both eyes   Seizure vs pseudo . Annamary Ripa Head CT : normal .. consider EEG as outpt   Cont  Ambien   Consultants input noted     DVT Prophylaxis: lovenox  Diet: DIET GENERAL;  Dietary Nutrition Supplements: Wound Healing Oral Supplement  Code Status: Full Code    Treatment progress and plan was d/w pt/family .         Naz Kamara MD

## 2019-06-23 LAB
CREAT SERPL-MCNC: 0.7 MG/DL (ref 0.6–1.1)
DOSE AMOUNT: ABNORMAL
DOSE TIME: ABNORMAL
GFR AFRICAN AMERICAN: >60 ML/MIN/1.73M2
GFR NON-AFRICAN AMERICAN: >60 ML/MIN/1.73M2
VANCOMYCIN TROUGH: 25 UG/ML (ref 10–20)

## 2019-06-23 PROCEDURE — 6370000000 HC RX 637 (ALT 250 FOR IP): Performed by: FAMILY MEDICINE

## 2019-06-23 PROCEDURE — 82565 ASSAY OF CREATININE: CPT

## 2019-06-23 PROCEDURE — 94761 N-INVAS EAR/PLS OXIMETRY MLT: CPT

## 2019-06-23 PROCEDURE — 1200000000 HC SEMI PRIVATE

## 2019-06-23 PROCEDURE — 6360000002 HC RX W HCPCS: Performed by: FAMILY MEDICINE

## 2019-06-23 PROCEDURE — 6370000000 HC RX 637 (ALT 250 FOR IP): Performed by: SURGERY

## 2019-06-23 PROCEDURE — 2580000003 HC RX 258: Performed by: FAMILY MEDICINE

## 2019-06-23 PROCEDURE — 6360000002 HC RX W HCPCS: Performed by: SURGERY

## 2019-06-23 PROCEDURE — 80202 ASSAY OF VANCOMYCIN: CPT

## 2019-06-23 RX ADMIN — CLONIDINE HYDROCHLORIDE 0.1 MG: 0.1 TABLET ORAL at 08:35

## 2019-06-23 RX ADMIN — PROMETHAZINE HYDROCHLORIDE 6.25 MG: 25 INJECTION INTRAMUSCULAR; INTRAVENOUS at 20:42

## 2019-06-23 RX ADMIN — CIPROFLOXACIN HYDROCHLORIDE 1 DROP: 3 SOLUTION/ DROPS OPHTHALMIC at 21:42

## 2019-06-23 RX ADMIN — HYDROMORPHONE HYDROCHLORIDE 0.5 MG: 2 INJECTION, SOLUTION INTRAMUSCULAR; INTRAVENOUS; SUBCUTANEOUS at 14:46

## 2019-06-23 RX ADMIN — MINERAL SUPPLEMENT IRON 300 MG / 5 ML STRENGTH LIQUID 100 PER BOX UNFLAVORED 300 MG: at 20:42

## 2019-06-23 RX ADMIN — CIPROFLOXACIN HYDROCHLORIDE 1 DROP: 3 SOLUTION/ DROPS OPHTHALMIC at 05:54

## 2019-06-23 RX ADMIN — GUAIFENESIN 600 MG: 600 TABLET, EXTENDED RELEASE ORAL at 20:42

## 2019-06-23 RX ADMIN — HYDROMORPHONE HYDROCHLORIDE 0.5 MG: 2 INJECTION, SOLUTION INTRAMUSCULAR; INTRAVENOUS; SUBCUTANEOUS at 00:53

## 2019-06-23 RX ADMIN — CIPROFLOXACIN HYDROCHLORIDE 1 DROP: 3 SOLUTION/ DROPS OPHTHALMIC at 18:52

## 2019-06-23 RX ADMIN — MINERAL SUPPLEMENT IRON 300 MG / 5 ML STRENGTH LIQUID 100 PER BOX UNFLAVORED 300 MG: at 08:34

## 2019-06-23 RX ADMIN — GUAIFENESIN 600 MG: 600 TABLET, EXTENDED RELEASE ORAL at 08:35

## 2019-06-23 RX ADMIN — CLONIDINE HYDROCHLORIDE 0.1 MG: 0.1 TABLET ORAL at 20:42

## 2019-06-23 RX ADMIN — ONDANSETRON 8 MG: 2 INJECTION INTRAMUSCULAR; INTRAVENOUS at 14:46

## 2019-06-23 RX ADMIN — DICYCLOMINE HYDROCHLORIDE 20 MG: 20 TABLET ORAL at 20:42

## 2019-06-23 RX ADMIN — HYDROMORPHONE HYDROCHLORIDE 0.5 MG: 2 INJECTION, SOLUTION INTRAMUSCULAR; INTRAVENOUS; SUBCUTANEOUS at 20:42

## 2019-06-23 RX ADMIN — PANTOPRAZOLE SODIUM 40 MG: 40 TABLET, DELAYED RELEASE ORAL at 16:34

## 2019-06-23 RX ADMIN — LEVOTHYROXINE SODIUM 125 MCG: 125 TABLET ORAL at 05:52

## 2019-06-23 RX ADMIN — HYDROMORPHONE HYDROCHLORIDE 0.5 MG: 2 INJECTION, SOLUTION INTRAMUSCULAR; INTRAVENOUS; SUBCUTANEOUS at 08:21

## 2019-06-23 RX ADMIN — ESTRADIOL 0.5 MG: 1 TABLET ORAL at 08:35

## 2019-06-23 RX ADMIN — PANTOPRAZOLE SODIUM 40 MG: 40 TABLET, DELAYED RELEASE ORAL at 05:53

## 2019-06-23 RX ADMIN — SENNOSIDES AND DOCUSATE SODIUM 2 TABLET: 8.6; 5 TABLET ORAL at 20:41

## 2019-06-23 RX ADMIN — DICYCLOMINE HYDROCHLORIDE 20 MG: 20 TABLET ORAL at 05:52

## 2019-06-23 RX ADMIN — SODIUM CHLORIDE, PRESERVATIVE FREE 10 ML: 5 INJECTION INTRAVENOUS at 08:21

## 2019-06-23 RX ADMIN — DICYCLOMINE HYDROCHLORIDE 20 MG: 20 TABLET ORAL at 00:53

## 2019-06-23 RX ADMIN — SODIUM CHLORIDE, PRESERVATIVE FREE 10 ML: 5 INJECTION INTRAVENOUS at 20:43

## 2019-06-23 RX ADMIN — CIPROFLOXACIN HYDROCHLORIDE 1 DROP: 3 SOLUTION/ DROPS OPHTHALMIC at 08:37

## 2019-06-23 RX ADMIN — PROMETHAZINE HYDROCHLORIDE 6.25 MG: 25 INJECTION INTRAMUSCULAR; INTRAVENOUS at 09:05

## 2019-06-23 RX ADMIN — GABAPENTIN 800 MG: 400 CAPSULE ORAL at 08:35

## 2019-06-23 RX ADMIN — ENOXAPARIN SODIUM 40 MG: 40 INJECTION SUBCUTANEOUS at 08:35

## 2019-06-23 RX ADMIN — ZOLPIDEM TARTRATE 5 MG: 5 TABLET ORAL at 21:47

## 2019-06-23 RX ADMIN — ATENOLOL 25 MG: 25 TABLET ORAL at 08:35

## 2019-06-23 RX ADMIN — CIPROFLOXACIN HYDROCHLORIDE 1 DROP: 3 SOLUTION/ DROPS OPHTHALMIC at 14:38

## 2019-06-23 RX ADMIN — GABAPENTIN 800 MG: 400 CAPSULE ORAL at 20:42

## 2019-06-23 RX ADMIN — GABAPENTIN 800 MG: 400 CAPSULE ORAL at 14:38

## 2019-06-23 RX ADMIN — DICYCLOMINE HYDROCHLORIDE 20 MG: 20 TABLET ORAL at 14:38

## 2019-06-23 RX ADMIN — PAROXETINE 30 MG: 10 TABLET, FILM COATED ORAL at 20:41

## 2019-06-23 RX ADMIN — OXYCODONE HYDROCHLORIDE AND ACETAMINOPHEN 1 TABLET: 5; 325 TABLET ORAL at 16:34

## 2019-06-23 RX ADMIN — FLUTICASONE PROPIONATE 1 SPRAY: 50 SPRAY, METERED NASAL at 08:37

## 2019-06-23 RX ADMIN — SENNOSIDES AND DOCUSATE SODIUM 2 TABLET: 8.6; 5 TABLET ORAL at 08:35

## 2019-06-23 ASSESSMENT — PAIN SCALES - GENERAL
PAINLEVEL_OUTOF10: 9
PAINLEVEL_OUTOF10: 9
PAINLEVEL_OUTOF10: 6
PAINLEVEL_OUTOF10: 8
PAINLEVEL_OUTOF10: 6
PAINLEVEL_OUTOF10: 8
PAINLEVEL_OUTOF10: 8
PAINLEVEL_OUTOF10: 6
PAINLEVEL_OUTOF10: 7

## 2019-06-23 ASSESSMENT — PAIN DESCRIPTION - DESCRIPTORS
DESCRIPTORS: ACHING;THROBBING
DESCRIPTORS: ACHING
DESCRIPTORS: ACHING
DESCRIPTORS: ACHING;THROBBING

## 2019-06-23 ASSESSMENT — PAIN DESCRIPTION - PAIN TYPE
TYPE: ACUTE PAIN

## 2019-06-23 ASSESSMENT — PAIN DESCRIPTION - ORIENTATION
ORIENTATION: LEFT

## 2019-06-23 ASSESSMENT — PAIN DESCRIPTION - PROGRESSION
CLINICAL_PROGRESSION: NOT CHANGED
CLINICAL_PROGRESSION: GRADUALLY WORSENING
CLINICAL_PROGRESSION: NOT CHANGED
CLINICAL_PROGRESSION: GRADUALLY WORSENING
CLINICAL_PROGRESSION: GRADUALLY WORSENING
CLINICAL_PROGRESSION: NOT CHANGED
CLINICAL_PROGRESSION: GRADUALLY WORSENING
CLINICAL_PROGRESSION: GRADUALLY WORSENING
CLINICAL_PROGRESSION: NOT CHANGED
CLINICAL_PROGRESSION: GRADUALLY WORSENING
CLINICAL_PROGRESSION: GRADUALLY WORSENING
CLINICAL_PROGRESSION: NOT CHANGED
CLINICAL_PROGRESSION: GRADUALLY IMPROVING

## 2019-06-23 ASSESSMENT — PAIN - FUNCTIONAL ASSESSMENT
PAIN_FUNCTIONAL_ASSESSMENT: PREVENTS OR INTERFERES SOME ACTIVE ACTIVITIES AND ADLS

## 2019-06-23 ASSESSMENT — PAIN DESCRIPTION - ONSET
ONSET: ON-GOING

## 2019-06-23 ASSESSMENT — PAIN DESCRIPTION - FREQUENCY
FREQUENCY: CONTINUOUS

## 2019-06-23 ASSESSMENT — PAIN DESCRIPTION - LOCATION
LOCATION: FOOT;LEG
LOCATION: FOOT
LOCATION: LEG;FOOT
LOCATION: FOOT;LEG

## 2019-06-23 ASSESSMENT — PAIN SCALES - WONG BAKER: WONGBAKER_NUMERICALRESPONSE: 0

## 2019-06-24 LAB
ALBUMIN SERPL-MCNC: 3.8 GM/DL (ref 3.4–5)
ALP BLD-CCNC: 130 IU/L (ref 40–128)
ALT SERPL-CCNC: 20 U/L (ref 10–40)
ANION GAP SERPL CALCULATED.3IONS-SCNC: 11 MMOL/L (ref 4–16)
AST SERPL-CCNC: 27 IU/L (ref 15–37)
BILIRUB SERPL-MCNC: 0.2 MG/DL (ref 0–1)
BUN BLDV-MCNC: 11 MG/DL (ref 6–23)
CALCIUM SERPL-MCNC: 10.6 MG/DL (ref 8.3–10.6)
CHLORIDE BLD-SCNC: 105 MMOL/L (ref 99–110)
CO2: 23 MMOL/L (ref 21–32)
CREAT SERPL-MCNC: 0.7 MG/DL (ref 0.6–1.1)
CREAT SERPL-MCNC: 0.7 MG/DL (ref 0.6–1.1)
DOSE AMOUNT: NORMAL
DOSE TIME: NORMAL
GFR AFRICAN AMERICAN: >60 ML/MIN/1.73M2
GFR AFRICAN AMERICAN: >60 ML/MIN/1.73M2
GFR NON-AFRICAN AMERICAN: >60 ML/MIN/1.73M2
GFR NON-AFRICAN AMERICAN: >60 ML/MIN/1.73M2
GLUCOSE BLD-MCNC: 100 MG/DL (ref 70–99)
HCT VFR BLD CALC: 34.2 % (ref 37–47)
HEMOGLOBIN: 9.9 GM/DL (ref 12.5–16)
MAGNESIUM: 2.4 MG/DL (ref 1.8–2.4)
MCH RBC QN AUTO: 24 PG (ref 27–31)
MCHC RBC AUTO-ENTMCNC: 28.9 % (ref 32–36)
MCV RBC AUTO: 83 FL (ref 78–100)
PDW BLD-RTO: 14.2 % (ref 11.7–14.9)
PLATELET # BLD: 260 K/CU MM (ref 140–440)
PMV BLD AUTO: 10 FL (ref 7.5–11.1)
POTASSIUM SERPL-SCNC: 4.5 MMOL/L (ref 3.5–5.1)
RBC # BLD: 4.12 M/CU MM (ref 4.2–5.4)
SODIUM BLD-SCNC: 139 MMOL/L (ref 135–145)
TOTAL PROTEIN: 6.7 GM/DL (ref 6.4–8.2)
VANCOMYCIN RANDOM: 6.9 UG/ML
VANCOMYCIN RANDOM: NORMAL UG/ML
WBC # BLD: 5.3 K/CU MM (ref 4–10.5)

## 2019-06-24 PROCEDURE — 83735 ASSAY OF MAGNESIUM: CPT

## 2019-06-24 PROCEDURE — 85027 COMPLETE CBC AUTOMATED: CPT

## 2019-06-24 PROCEDURE — 6370000000 HC RX 637 (ALT 250 FOR IP): Performed by: FAMILY MEDICINE

## 2019-06-24 PROCEDURE — 6370000000 HC RX 637 (ALT 250 FOR IP): Performed by: SURGERY

## 2019-06-24 PROCEDURE — 6360000002 HC RX W HCPCS: Performed by: FAMILY MEDICINE

## 2019-06-24 PROCEDURE — 80053 COMPREHEN METABOLIC PANEL: CPT

## 2019-06-24 PROCEDURE — 1200000000 HC SEMI PRIVATE

## 2019-06-24 PROCEDURE — 6360000002 HC RX W HCPCS: Performed by: SURGERY

## 2019-06-24 PROCEDURE — 36415 COLL VENOUS BLD VENIPUNCTURE: CPT

## 2019-06-24 PROCEDURE — 80202 ASSAY OF VANCOMYCIN: CPT

## 2019-06-24 PROCEDURE — 2580000003 HC RX 258: Performed by: FAMILY MEDICINE

## 2019-06-24 PROCEDURE — 99232 SBSQ HOSP IP/OBS MODERATE 35: CPT | Performed by: SURGERY

## 2019-06-24 PROCEDURE — 82565 ASSAY OF CREATININE: CPT

## 2019-06-24 RX ADMIN — PANTOPRAZOLE SODIUM 40 MG: 40 TABLET, DELAYED RELEASE ORAL at 19:12

## 2019-06-24 RX ADMIN — PANTOPRAZOLE SODIUM 40 MG: 40 TABLET, DELAYED RELEASE ORAL at 05:23

## 2019-06-24 RX ADMIN — OXYCODONE HYDROCHLORIDE AND ACETAMINOPHEN 1 TABLET: 5; 325 TABLET ORAL at 12:10

## 2019-06-24 RX ADMIN — SENNOSIDES AND DOCUSATE SODIUM 2 TABLET: 8.6; 5 TABLET ORAL at 20:27

## 2019-06-24 RX ADMIN — DICYCLOMINE HYDROCHLORIDE 20 MG: 20 TABLET ORAL at 20:32

## 2019-06-24 RX ADMIN — SODIUM CHLORIDE, PRESERVATIVE FREE 10 ML: 5 INJECTION INTRAVENOUS at 20:26

## 2019-06-24 RX ADMIN — PROMETHAZINE HYDROCHLORIDE 6.25 MG: 25 INJECTION INTRAMUSCULAR; INTRAVENOUS at 12:11

## 2019-06-24 RX ADMIN — HYDROMORPHONE HYDROCHLORIDE 0.5 MG: 2 INJECTION, SOLUTION INTRAMUSCULAR; INTRAVENOUS; SUBCUTANEOUS at 04:46

## 2019-06-24 RX ADMIN — ENOXAPARIN SODIUM 40 MG: 40 INJECTION SUBCUTANEOUS at 09:23

## 2019-06-24 RX ADMIN — CIPROFLOXACIN HYDROCHLORIDE 1 DROP: 3 SOLUTION/ DROPS OPHTHALMIC at 05:27

## 2019-06-24 RX ADMIN — DICYCLOMINE HYDROCHLORIDE 20 MG: 20 TABLET ORAL at 15:30

## 2019-06-24 RX ADMIN — HYDROMORPHONE HYDROCHLORIDE 0.5 MG: 2 INJECTION, SOLUTION INTRAMUSCULAR; INTRAVENOUS; SUBCUTANEOUS at 09:24

## 2019-06-24 RX ADMIN — SODIUM CHLORIDE, PRESERVATIVE FREE 10 ML: 5 INJECTION INTRAVENOUS at 09:26

## 2019-06-24 RX ADMIN — PROMETHAZINE HYDROCHLORIDE 6.25 MG: 25 INJECTION INTRAMUSCULAR; INTRAVENOUS at 04:46

## 2019-06-24 RX ADMIN — GABAPENTIN 800 MG: 400 CAPSULE ORAL at 09:23

## 2019-06-24 RX ADMIN — MINERAL SUPPLEMENT IRON 300 MG / 5 ML STRENGTH LIQUID 100 PER BOX UNFLAVORED 300 MG: at 09:23

## 2019-06-24 RX ADMIN — ESTRADIOL 0.5 MG: 1 TABLET ORAL at 09:23

## 2019-06-24 RX ADMIN — GUAIFENESIN 600 MG: 600 TABLET, EXTENDED RELEASE ORAL at 20:31

## 2019-06-24 RX ADMIN — PROMETHAZINE HYDROCHLORIDE 6.25 MG: 25 INJECTION INTRAMUSCULAR; INTRAVENOUS at 20:26

## 2019-06-24 RX ADMIN — MINERAL SUPPLEMENT IRON 300 MG / 5 ML STRENGTH LIQUID 100 PER BOX UNFLAVORED 300 MG: at 20:26

## 2019-06-24 RX ADMIN — DICYCLOMINE HYDROCHLORIDE 20 MG: 20 TABLET ORAL at 00:42

## 2019-06-24 RX ADMIN — OXYCODONE HYDROCHLORIDE AND ACETAMINOPHEN 1 TABLET: 5; 325 TABLET ORAL at 00:42

## 2019-06-24 RX ADMIN — FLUTICASONE PROPIONATE 1 SPRAY: 50 SPRAY, METERED NASAL at 09:26

## 2019-06-24 RX ADMIN — LEVOTHYROXINE SODIUM 125 MCG: 125 TABLET ORAL at 05:23

## 2019-06-24 RX ADMIN — HYDROMORPHONE HYDROCHLORIDE 0.5 MG: 2 INJECTION, SOLUTION INTRAMUSCULAR; INTRAVENOUS; SUBCUTANEOUS at 15:30

## 2019-06-24 RX ADMIN — CIPROFLOXACIN HYDROCHLORIDE 1 DROP: 3 SOLUTION/ DROPS OPHTHALMIC at 12:10

## 2019-06-24 RX ADMIN — SENNOSIDES AND DOCUSATE SODIUM 2 TABLET: 8.6; 5 TABLET ORAL at 09:22

## 2019-06-24 RX ADMIN — VANCOMYCIN HYDROCHLORIDE 1500 MG: 5 INJECTION, POWDER, LYOPHILIZED, FOR SOLUTION INTRAVENOUS at 09:23

## 2019-06-24 RX ADMIN — OXYCODONE HYDROCHLORIDE AND ACETAMINOPHEN 1 TABLET: 5; 325 TABLET ORAL at 19:12

## 2019-06-24 RX ADMIN — HYDROMORPHONE HYDROCHLORIDE 0.5 MG: 2 INJECTION, SOLUTION INTRAMUSCULAR; INTRAVENOUS; SUBCUTANEOUS at 20:26

## 2019-06-24 RX ADMIN — PAROXETINE 30 MG: 10 TABLET, FILM COATED ORAL at 20:31

## 2019-06-24 RX ADMIN — GUAIFENESIN 600 MG: 600 TABLET, EXTENDED RELEASE ORAL at 09:23

## 2019-06-24 RX ADMIN — DICYCLOMINE HYDROCHLORIDE 20 MG: 20 TABLET ORAL at 06:56

## 2019-06-24 RX ADMIN — GABAPENTIN 800 MG: 400 CAPSULE ORAL at 15:30

## 2019-06-24 RX ADMIN — ATENOLOL 25 MG: 25 TABLET ORAL at 09:22

## 2019-06-24 RX ADMIN — GABAPENTIN 800 MG: 400 CAPSULE ORAL at 20:26

## 2019-06-24 ASSESSMENT — PAIN SCALES - GENERAL
PAINLEVEL_OUTOF10: 6
PAINLEVEL_OUTOF10: 7
PAINLEVEL_OUTOF10: 6
PAINLEVEL_OUTOF10: 7
PAINLEVEL_OUTOF10: 7
PAINLEVEL_OUTOF10: 9
PAINLEVEL_OUTOF10: 7

## 2019-06-24 ASSESSMENT — PAIN DESCRIPTION - PROGRESSION
CLINICAL_PROGRESSION: NOT CHANGED
CLINICAL_PROGRESSION: NOT CHANGED

## 2019-06-24 ASSESSMENT — PAIN - FUNCTIONAL ASSESSMENT: PAIN_FUNCTIONAL_ASSESSMENT: PREVENTS OR INTERFERES SOME ACTIVE ACTIVITIES AND ADLS

## 2019-06-24 ASSESSMENT — PAIN DESCRIPTION - LOCATION
LOCATION: FOOT;LEG
LOCATION: FOOT;LEG

## 2019-06-24 ASSESSMENT — PAIN DESCRIPTION - PAIN TYPE
TYPE: ACUTE PAIN
TYPE: ACUTE PAIN

## 2019-06-24 ASSESSMENT — PAIN DESCRIPTION - FREQUENCY
FREQUENCY: CONTINUOUS
FREQUENCY: CONTINUOUS

## 2019-06-24 ASSESSMENT — PAIN DESCRIPTION - ORIENTATION
ORIENTATION: LEFT
ORIENTATION: LEFT

## 2019-06-24 ASSESSMENT — PAIN DESCRIPTION - DESCRIPTORS
DESCRIPTORS: ACHING;THROBBING
DESCRIPTORS: ACHING

## 2019-06-24 ASSESSMENT — PAIN DESCRIPTION - ONSET
ONSET: ON-GOING
ONSET: ON-GOING

## 2019-06-24 NOTE — PROGRESS NOTES
Obesity / 4 months s/p lap sleeve gastrectomy. POST-OP DIAGNOSIS: Same + Per the EGD performed all the way to the 3rd portion of the duodenum:  - Z-line was @ 38 cm from the superior incisors' level  - Mild GERD stigmata noted, no obvious Hiatal Hernia, no changes suspicious Marsh's  - No gastritis  - No peptic ulcer disease  - Some biliary reflux  - NOT ENLARGED gastric sleeve       Assessment and Plan:  Karla Segal is a 46 y.o. female with left foot infection, on IV Abx, GI wise feels better. Re-checked EGD 5 days ago, and IMPROVED gastritis, AND NO ACTIVE UPPER GI BLEED. Belkys Rodriguez Hb is still stable - started Fe supplements, and well tolerated. Increase Ambulation to at least 4x/day walk in the hallways with assistance. Respiratory cha-operative care: Incentive Spirometry / deep breathing and coughing 10x/hr while awake. Continue DVT prophylaxis with Teds and SCDs and SC Lovenox. ___________________________________________    Malia Garcia MD, FACS, FICS  6/24/2019  8:11 AM    Patient was seen with total face to face time of 25 minutes. More than 50% of this visit was counseling and education as above in my assessment and plan section of my note.

## 2019-06-24 NOTE — PROGRESS NOTES
Attending Progress Note      PCP: Lavon Cordero MD    Date of Admission: 6/14/2019    Chief Complaint:   Chief Complaint   Patient presents with    Other     positive blood cultures           Subjective:  Still complaining of severe pain . Ledell Lonny Port not functioning . Ledell Lonny PICC ordered     No fever/chills , no N/V/diarrhea     Medications:  Reviewed  Infusion Medications     Scheduled Medications    vancomycin  1,500 mg Intravenous Q18H    sennosides-docusate sodium  2 tablet Oral BID    ferrous sulfate  300 mg Oral BID    ciprofloxacin  1 drop Both Eyes Q4H While awake    guaiFENesin  600 mg Oral BID    fluticasone  1 spray Each Nostril Daily    atenolol  25 mg Oral Daily    cloNIDine  0.1 mg Oral BID    estradiol  0.5 mg Oral Daily    dicyclomine  20 mg Oral Q6H    gabapentin  800 mg Oral TID    levothyroxine  125 mcg Oral Daily    pantoprazole  40 mg Oral BID AC    PARoxetine  30 mg Oral Nightly    sodium chloride flush  10 mL Intravenous 2 times per day    enoxaparin  40 mg Subcutaneous Daily     PRN Meds: oxyCODONE-acetaminophen, HYDROmorphone, ondansetron, promethazine, zolpidem, sodium chloride flush, magnesium hydroxide, acetaminophen      Intake/Output Summary (Last 24 hours) at 6/24/2019 1940  Last data filed at 6/24/2019 0529  Gross per 24 hour   Intake 190 ml   Output --   Net 190 ml       Exam:  BP (!) 95/51   Pulse 57   Temp 97.7 °F (36.5 °C) (Oral)   Resp 17   Ht 5' 4\" (1.626 m)   Wt 254 lb (115.2 kg)   SpO2 100%   BMI 43.60 kg/m²   General appearance: No distress, appears stated age and cooperative. Respiratory:  symmetrical , good air entry , no Rales , No wheezing, or rhonchi,  Cardiovascular: RRR, with normal S1/S2 without murmurs. Abdomen : Soft, non-tender, non-distended  , normal bowel sounds. Musculoskelatal: left foot wrapped  No edema bilaterally.  No DVT signs ,  Full range of motion   Neurologic:  Alert and oriented , no focal sensory/motor deficits , grossly

## 2019-06-24 NOTE — PROGRESS NOTES
Attending Progress Note      PCP: Valentino Myers, MD    Date of Admission: 6/14/2019    Chief Complaint:   Chief Complaint   Patient presents with    Other     positive blood cultures           Subjective:  Still complaining of severe pain . Corky Real Had episodes of hypotension related to pain meds   Foot still oozing    No fever/chills , no N/V/diarrhea     Medications:  Reviewed  Infusion Medications     Scheduled Medications    vancomycin  1,500 mg Intravenous Q18H    sennosides-docusate sodium  2 tablet Oral BID    ferrous sulfate  300 mg Oral BID    ciprofloxacin  1 drop Both Eyes Q4H While awake    guaiFENesin  600 mg Oral BID    fluticasone  1 spray Each Nostril Daily    atenolol  25 mg Oral Daily    cloNIDine  0.1 mg Oral BID    estradiol  0.5 mg Oral Daily    dicyclomine  20 mg Oral Q6H    gabapentin  800 mg Oral TID    levothyroxine  125 mcg Oral Daily    pantoprazole  40 mg Oral BID AC    PARoxetine  30 mg Oral Nightly    sodium chloride flush  10 mL Intravenous 2 times per day    enoxaparin  40 mg Subcutaneous Daily     PRN Meds: oxyCODONE-acetaminophen, HYDROmorphone, ondansetron, promethazine, zolpidem, sodium chloride flush, magnesium hydroxide, acetaminophen      Intake/Output Summary (Last 24 hours) at 6/24/2019 0237  Last data filed at 6/24/2019 0047  Gross per 24 hour   Intake 130 ml   Output --   Net 130 ml       Exam:  BP (!) 114/59   Pulse 71   Temp 98.9 °F (37.2 °C) (Oral)   Resp 16   Ht 5' 4\" (1.626 m)   Wt 273 lb (123.8 kg)   SpO2 99%   BMI 46.86 kg/m²   General appearance: No distress, appears stated age and cooperative. Respiratory:  symmetrical , good air entry , no Rales , No wheezing, or rhonchi,  Cardiovascular: RRR, with normal S1/S2 without murmurs. Abdomen : Soft, non-tender, non-distended  , normal bowel sounds. Musculoskelatal: left foot wrapped  No edema bilaterally.  No DVT signs ,  Full range of motion   Neurologic:  Alert and oriented , no focal

## 2019-06-25 LAB
CULTURE: NORMAL
Lab: NORMAL
SPECIMEN: NORMAL

## 2019-06-25 PROCEDURE — 6360000002 HC RX W HCPCS: Performed by: FAMILY MEDICINE

## 2019-06-25 PROCEDURE — 6370000000 HC RX 637 (ALT 250 FOR IP): Performed by: FAMILY MEDICINE

## 2019-06-25 PROCEDURE — 6360000002 HC RX W HCPCS: Performed by: SURGERY

## 2019-06-25 PROCEDURE — 99232 SBSQ HOSP IP/OBS MODERATE 35: CPT | Performed by: SURGERY

## 2019-06-25 PROCEDURE — 1200000000 HC SEMI PRIVATE

## 2019-06-25 PROCEDURE — 36593 DECLOT VASCULAR DEVICE: CPT

## 2019-06-25 PROCEDURE — 6370000000 HC RX 637 (ALT 250 FOR IP): Performed by: SURGERY

## 2019-06-25 PROCEDURE — 94761 N-INVAS EAR/PLS OXIMETRY MLT: CPT

## 2019-06-25 PROCEDURE — 2580000003 HC RX 258: Performed by: FAMILY MEDICINE

## 2019-06-25 RX ORDER — PROMETHAZINE HYDROCHLORIDE 25 MG/1
25 TABLET ORAL EVERY 6 HOURS PRN
Qty: 30 TABLET | Refills: 2 | Status: SHIPPED | OUTPATIENT
Start: 2019-06-25 | End: 2019-07-02

## 2019-06-25 RX ORDER — LANOLIN ALCOHOL/MO/W.PET/CERES
325 CREAM (GRAM) TOPICAL 2 TIMES DAILY
Qty: 60 TABLET | Refills: 5 | Status: SHIPPED | OUTPATIENT
Start: 2019-06-25 | End: 2020-07-02

## 2019-06-25 RX ADMIN — GABAPENTIN 800 MG: 400 CAPSULE ORAL at 17:22

## 2019-06-25 RX ADMIN — ZOLPIDEM TARTRATE 5 MG: 5 TABLET ORAL at 00:11

## 2019-06-25 RX ADMIN — SENNOSIDES AND DOCUSATE SODIUM 2 TABLET: 8.6; 5 TABLET ORAL at 09:37

## 2019-06-25 RX ADMIN — ENOXAPARIN SODIUM 40 MG: 40 INJECTION SUBCUTANEOUS at 09:36

## 2019-06-25 RX ADMIN — DICYCLOMINE HYDROCHLORIDE 20 MG: 20 TABLET ORAL at 21:01

## 2019-06-25 RX ADMIN — DICYCLOMINE HYDROCHLORIDE 20 MG: 20 TABLET ORAL at 06:14

## 2019-06-25 RX ADMIN — VANCOMYCIN HYDROCHLORIDE 1500 MG: 5 INJECTION, POWDER, LYOPHILIZED, FOR SOLUTION INTRAVENOUS at 03:20

## 2019-06-25 RX ADMIN — PAROXETINE 30 MG: 10 TABLET, FILM COATED ORAL at 20:58

## 2019-06-25 RX ADMIN — ALTEPLASE 1 MG: 2.2 INJECTION, POWDER, LYOPHILIZED, FOR SOLUTION INTRAVENOUS at 17:30

## 2019-06-25 RX ADMIN — PANTOPRAZOLE SODIUM 40 MG: 40 TABLET, DELAYED RELEASE ORAL at 06:14

## 2019-06-25 RX ADMIN — SODIUM CHLORIDE, PRESERVATIVE FREE 10 ML: 5 INJECTION INTRAVENOUS at 09:36

## 2019-06-25 RX ADMIN — ATENOLOL 25 MG: 25 TABLET ORAL at 09:37

## 2019-06-25 RX ADMIN — HYDROMORPHONE HYDROCHLORIDE 0.5 MG: 2 INJECTION, SOLUTION INTRAMUSCULAR; INTRAVENOUS; SUBCUTANEOUS at 00:11

## 2019-06-25 RX ADMIN — OXYCODONE HYDROCHLORIDE AND ACETAMINOPHEN 1 TABLET: 5; 325 TABLET ORAL at 09:37

## 2019-06-25 RX ADMIN — SODIUM CHLORIDE, PRESERVATIVE FREE 10 ML: 5 INJECTION INTRAVENOUS at 21:13

## 2019-06-25 RX ADMIN — DICYCLOMINE HYDROCHLORIDE 20 MG: 20 TABLET ORAL at 17:23

## 2019-06-25 RX ADMIN — HYDROMORPHONE HYDROCHLORIDE 0.5 MG: 2 INJECTION, SOLUTION INTRAMUSCULAR; INTRAVENOUS; SUBCUTANEOUS at 21:13

## 2019-06-25 RX ADMIN — SENNOSIDES AND DOCUSATE SODIUM 2 TABLET: 8.6; 5 TABLET ORAL at 20:58

## 2019-06-25 RX ADMIN — ESTRADIOL 0.5 MG: 1 TABLET ORAL at 09:36

## 2019-06-25 RX ADMIN — CLONIDINE HYDROCHLORIDE 0.1 MG: 0.1 TABLET ORAL at 20:58

## 2019-06-25 RX ADMIN — LEVOTHYROXINE SODIUM 125 MCG: 125 TABLET ORAL at 06:14

## 2019-06-25 RX ADMIN — MINERAL SUPPLEMENT IRON 300 MG / 5 ML STRENGTH LIQUID 100 PER BOX UNFLAVORED 300 MG: at 20:58

## 2019-06-25 RX ADMIN — PROMETHAZINE HYDROCHLORIDE 6.25 MG: 25 INJECTION INTRAMUSCULAR; INTRAVENOUS at 17:22

## 2019-06-25 RX ADMIN — ZOLPIDEM TARTRATE 5 MG: 5 TABLET ORAL at 23:22

## 2019-06-25 RX ADMIN — HYDROMORPHONE HYDROCHLORIDE 0.5 MG: 2 INJECTION, SOLUTION INTRAMUSCULAR; INTRAVENOUS; SUBCUTANEOUS at 03:28

## 2019-06-25 RX ADMIN — HYDROMORPHONE HYDROCHLORIDE 0.5 MG: 2 INJECTION, SOLUTION INTRAMUSCULAR; INTRAVENOUS; SUBCUTANEOUS at 11:58

## 2019-06-25 RX ADMIN — CIPROFLOXACIN HYDROCHLORIDE 1 DROP: 3 SOLUTION/ DROPS OPHTHALMIC at 17:25

## 2019-06-25 RX ADMIN — SODIUM CHLORIDE, PRESERVATIVE FREE 10 ML: 5 INJECTION INTRAVENOUS at 11:59

## 2019-06-25 RX ADMIN — VANCOMYCIN HYDROCHLORIDE 1500 MG: 5 INJECTION, POWDER, LYOPHILIZED, FOR SOLUTION INTRAVENOUS at 21:14

## 2019-06-25 RX ADMIN — PANTOPRAZOLE SODIUM 40 MG: 40 TABLET, DELAYED RELEASE ORAL at 17:23

## 2019-06-25 RX ADMIN — DICYCLOMINE HYDROCHLORIDE 20 MG: 20 TABLET ORAL at 01:07

## 2019-06-25 RX ADMIN — GABAPENTIN 800 MG: 400 CAPSULE ORAL at 20:58

## 2019-06-25 RX ADMIN — PROMETHAZINE HYDROCHLORIDE 6.25 MG: 25 INJECTION INTRAMUSCULAR; INTRAVENOUS at 09:48

## 2019-06-25 RX ADMIN — HYDROMORPHONE HYDROCHLORIDE 0.5 MG: 2 INJECTION, SOLUTION INTRAMUSCULAR; INTRAVENOUS; SUBCUTANEOUS at 17:22

## 2019-06-25 RX ADMIN — GABAPENTIN 800 MG: 400 CAPSULE ORAL at 09:36

## 2019-06-25 RX ADMIN — GUAIFENESIN 600 MG: 600 TABLET, EXTENDED RELEASE ORAL at 20:58

## 2019-06-25 RX ADMIN — PROMETHAZINE HYDROCHLORIDE 6.25 MG: 25 INJECTION INTRAMUSCULAR; INTRAVENOUS at 03:28

## 2019-06-25 RX ADMIN — GUAIFENESIN 600 MG: 600 TABLET, EXTENDED RELEASE ORAL at 09:36

## 2019-06-25 RX ADMIN — PROMETHAZINE HYDROCHLORIDE 6.25 MG: 25 INJECTION INTRAMUSCULAR; INTRAVENOUS at 23:21

## 2019-06-25 ASSESSMENT — PAIN SCALES - GENERAL
PAINLEVEL_OUTOF10: 7
PAINLEVEL_OUTOF10: 7
PAINLEVEL_OUTOF10: 9
PAINLEVEL_OUTOF10: 8
PAINLEVEL_OUTOF10: 7
PAINLEVEL_OUTOF10: 9

## 2019-06-25 NOTE — PROGRESS NOTES
[address] [facility]  consulted by Dr. Maame Dunn for monitoring and adjustment. Indication for treatment: infected foot abscess, left foot osteomyelitis  Goal trough: 15 mcg/mL     Pertinent Laboratory Values:   Temp Readings from Last 3 Encounters:   06/25/19 98.2 °F (36.8 °C) (Oral)   06/16/19 98.6 °F (37 °C)   06/13/19 98.3 °F (36.8 °C) (Oral)     Recent Labs     06/24/19  2258   WBC 5.3     Recent Labs     06/23/19  0826 06/24/19  0521 06/24/19  2258   BUN  --   --  11   CREATININE 0.7 0.7 0.7     Estimated Creatinine Clearance: 125 mL/min (based on SCr of 0.7 mg/dL). Intake/Output Summary (Last 24 hours) at 6/25/2019 1535  Last data filed at 6/25/2019 0520  Gross per 24 hour   Intake --   Output 1050 ml   Net -1050 ml       Pertinent Cultures:  Date    Source    Results  6/13   Wound    MSSA  6/14   Blood    Negative  6/16   Surgical   MSSA  6/20   Wound     NGTD    Vancomycin level:   TROUGH:    Recent Labs     06/23/19  0826   VANCOTROUGH 25.0*     RANDOM:    Recent Labs     06/24/19  0521   VANCORANDOM 6.9  (NOTE)  Therapeutic Range Interpretation: Please refer to current dosing   guidelines. Assessment:  · WBC and temperature: WNL. Afebrile. · SCr, BUN, and urine output: SCR has been normal, output good  · Day(s) of therapy: #12  · Vancomycin level: 6/21 - 23.8                                       6/23 - 25.0                                      6/24 - 6.9 (no doses given on 6/23)  Plan:  · Random level yesterday morning was low which indicates that the patient is clearing the vanco  · Continue with vancomycin 1500 mg IVPB every 18 hours  · Next trough level: 06-26-19 @ 1430. · Foot wound debrided by podiatry, IV antibiotics recommended  · Most recent/repeat blood cultures are negative (originally concerned for bacteremia). · Vancomycin can be de-escalated given MSSA seen on wound and surgical cultures if appropriate per clinical course.   · Pharmacy will continue to

## 2019-06-25 NOTE — PROGRESS NOTES
Recent Labs     06/24/19 2258   WBC 5.3   HGB 9.9*   HCT 34.2*        Recent Labs     06/23/19  0826 06/24/19  0521 06/24/19  2258   NA  --   --  139   K  --   --  4.5   CL  --   --  105   CO2  --   --  23   BUN  --   --  11   CREATININE 0.7 0.7 0.7   CALCIUM  --   --  10.6     Recent Labs     06/24/19  2258   AST 27   ALT 20   BILITOT 0.2   ALKPHOS 130*     No results for input(s): INR in the last 72 hours. No results for input(s): Tanya Cam in the last 72 hours. Assessment/Plan:    Active Hospital Problems    Diagnosis Date Noted    Acute conjunctivitis [H10.30] 06/18/2019     Priority: Medium    Bacteremia [R78.81] 06/15/2019   left foot  osteomylitis , s/p OR   hypothyroidism   H/op drug abuse   bilateral t conjunctivitis    S/p gastric sleeve with Anemia         Plan for d/c tomorrow , after access secured   Still no IV access yet . Gae Notch Podiatrist input noted . Gae Notch Will d/c on IV ABX up to total 6 wks     morphine 2 mg/4 mg Q2-4 hours , norco 7.5 Q 4 . Gae Notch Cont Vanco .. Pharmacy dosing . Gae Notch Gae Notch Blood c/s: neg . . surg c/s: staph aureus   Anemia EGD done . Gae Notch No acute finding . Gae Notch Gae Notch Gae Notch H/H stable . Gae Notch Steady at   8.7 .. Cont    Cont ciprofloxacin both eyes   Seizure vs pseudo . Gae Notch Head CT : normal .. consider EEG as outpt     DVT Prophylaxis: lovenox  Diet: DIET GENERAL;  Dietary Nutrition Supplements: Wound Healing Oral Supplement  Code Status: Full Code    Treatment progress and plan was d/w pt/family .         Esther Herrmann MD

## 2019-06-26 VITALS
BODY MASS INDEX: 46.74 KG/M2 | RESPIRATION RATE: 17 BRPM | TEMPERATURE: 97.7 F | OXYGEN SATURATION: 95 % | WEIGHT: 273.8 LBS | DIASTOLIC BLOOD PRESSURE: 53 MMHG | SYSTOLIC BLOOD PRESSURE: 118 MMHG | HEIGHT: 64 IN | HEART RATE: 70 BPM

## 2019-06-26 PROBLEM — R78.81 BACTEREMIA: Status: RESOLVED | Noted: 2019-06-15 | Resolved: 2019-06-26

## 2019-06-26 LAB
CREAT SERPL-MCNC: 0.7 MG/DL (ref 0.6–1.1)
ERYTHROCYTE SEDIMENTATION RATE: 60 MM/HR (ref 0–20)
GFR AFRICAN AMERICAN: >60 ML/MIN/1.73M2
GFR NON-AFRICAN AMERICAN: >60 ML/MIN/1.73M2
HIGH SENSITIVE C-REACTIVE PROTEIN: 9.6 MG/L

## 2019-06-26 PROCEDURE — 6360000002 HC RX W HCPCS: Performed by: FAMILY MEDICINE

## 2019-06-26 PROCEDURE — 99222 1ST HOSP IP/OBS MODERATE 55: CPT | Performed by: INTERNAL MEDICINE

## 2019-06-26 PROCEDURE — 82565 ASSAY OF CREATININE: CPT

## 2019-06-26 PROCEDURE — 99232 SBSQ HOSP IP/OBS MODERATE 35: CPT | Performed by: SURGERY

## 2019-06-26 PROCEDURE — 85652 RBC SED RATE AUTOMATED: CPT

## 2019-06-26 PROCEDURE — 6360000002 HC RX W HCPCS: Performed by: INTERNAL MEDICINE

## 2019-06-26 PROCEDURE — 6360000002 HC RX W HCPCS: Performed by: SURGERY

## 2019-06-26 PROCEDURE — 86141 C-REACTIVE PROTEIN HS: CPT

## 2019-06-26 PROCEDURE — 2580000003 HC RX 258: Performed by: FAMILY MEDICINE

## 2019-06-26 PROCEDURE — 6370000000 HC RX 637 (ALT 250 FOR IP): Performed by: SURGERY

## 2019-06-26 PROCEDURE — 6370000000 HC RX 637 (ALT 250 FOR IP): Performed by: FAMILY MEDICINE

## 2019-06-26 PROCEDURE — 94761 N-INVAS EAR/PLS OXIMETRY MLT: CPT

## 2019-06-26 RX ORDER — CEFAZOLIN SODIUM 2 G/100ML
2 INJECTION, SOLUTION INTRAVENOUS EVERY 8 HOURS
Status: DISCONTINUED | OUTPATIENT
Start: 2019-06-26 | End: 2019-06-26 | Stop reason: HOSPADM

## 2019-06-26 RX ORDER — OXYCODONE HYDROCHLORIDE AND ACETAMINOPHEN 5; 325 MG/1; MG/1
1 TABLET ORAL EVERY 12 HOURS PRN
Qty: 12 TABLET | Refills: 0 | Status: SHIPPED | OUTPATIENT
Start: 2019-06-26 | End: 2019-07-02

## 2019-06-26 RX ORDER — CEFAZOLIN SODIUM 2 G/50ML
2 SOLUTION INTRAVENOUS EVERY 8 HOURS
Status: DISCONTINUED | OUTPATIENT
Start: 2019-06-26 | End: 2019-06-26 | Stop reason: CLARIF

## 2019-06-26 RX ORDER — CEFAZOLIN SODIUM 2 G/100ML
2 INJECTION, SOLUTION INTRAVENOUS EVERY 8 HOURS
Qty: 3000 ML | Refills: 0 | Status: ON HOLD | OUTPATIENT
Start: 2019-06-26 | End: 2019-07-23 | Stop reason: SDUPTHER

## 2019-06-26 RX ADMIN — FLUTICASONE PROPIONATE 1 SPRAY: 50 SPRAY, METERED NASAL at 09:40

## 2019-06-26 RX ADMIN — ATENOLOL 25 MG: 25 TABLET ORAL at 09:38

## 2019-06-26 RX ADMIN — MINERAL SUPPLEMENT IRON 300 MG / 5 ML STRENGTH LIQUID 100 PER BOX UNFLAVORED 300 MG: at 09:40

## 2019-06-26 RX ADMIN — SENNOSIDES AND DOCUSATE SODIUM 2 TABLET: 8.6; 5 TABLET ORAL at 09:38

## 2019-06-26 RX ADMIN — GUAIFENESIN 600 MG: 600 TABLET, EXTENDED RELEASE ORAL at 09:39

## 2019-06-26 RX ADMIN — SODIUM CHLORIDE, PRESERVATIVE FREE 10 ML: 5 INJECTION INTRAVENOUS at 09:40

## 2019-06-26 RX ADMIN — LEVOTHYROXINE SODIUM 125 MCG: 125 TABLET ORAL at 05:32

## 2019-06-26 RX ADMIN — PROMETHAZINE HYDROCHLORIDE 6.25 MG: 25 INJECTION INTRAMUSCULAR; INTRAVENOUS at 11:30

## 2019-06-26 RX ADMIN — OXYCODONE HYDROCHLORIDE AND ACETAMINOPHEN 1 TABLET: 5; 325 TABLET ORAL at 09:38

## 2019-06-26 RX ADMIN — PROMETHAZINE HYDROCHLORIDE 6.25 MG: 25 INJECTION INTRAMUSCULAR; INTRAVENOUS at 05:42

## 2019-06-26 RX ADMIN — CIPROFLOXACIN HYDROCHLORIDE 1 DROP: 3 SOLUTION/ DROPS OPHTHALMIC at 05:34

## 2019-06-26 RX ADMIN — CLONIDINE HYDROCHLORIDE 0.1 MG: 0.1 TABLET ORAL at 09:38

## 2019-06-26 RX ADMIN — HYDROMORPHONE HYDROCHLORIDE 0.5 MG: 2 INJECTION, SOLUTION INTRAMUSCULAR; INTRAVENOUS; SUBCUTANEOUS at 01:22

## 2019-06-26 RX ADMIN — CIPROFLOXACIN HYDROCHLORIDE 1 DROP: 3 SOLUTION/ DROPS OPHTHALMIC at 13:40

## 2019-06-26 RX ADMIN — PANTOPRAZOLE SODIUM 40 MG: 40 TABLET, DELAYED RELEASE ORAL at 05:32

## 2019-06-26 RX ADMIN — DICYCLOMINE HYDROCHLORIDE 20 MG: 20 TABLET ORAL at 01:22

## 2019-06-26 RX ADMIN — CEFAZOLIN SODIUM 2 G: 2 INJECTION, SOLUTION INTRAVENOUS at 12:24

## 2019-06-26 RX ADMIN — GABAPENTIN 800 MG: 400 CAPSULE ORAL at 09:39

## 2019-06-26 RX ADMIN — CIPROFLOXACIN HYDROCHLORIDE 1 DROP: 3 SOLUTION/ DROPS OPHTHALMIC at 09:40

## 2019-06-26 RX ADMIN — GABAPENTIN 800 MG: 400 CAPSULE ORAL at 13:40

## 2019-06-26 RX ADMIN — HYDROMORPHONE HYDROCHLORIDE 0.5 MG: 2 INJECTION, SOLUTION INTRAMUSCULAR; INTRAVENOUS; SUBCUTANEOUS at 05:32

## 2019-06-26 RX ADMIN — DICYCLOMINE HYDROCHLORIDE 20 MG: 20 TABLET ORAL at 09:40

## 2019-06-26 RX ADMIN — ENOXAPARIN SODIUM 40 MG: 40 INJECTION SUBCUTANEOUS at 09:40

## 2019-06-26 RX ADMIN — ESTRADIOL 0.5 MG: 1 TABLET ORAL at 09:39

## 2019-06-26 RX ADMIN — DICYCLOMINE HYDROCHLORIDE 20 MG: 20 TABLET ORAL at 13:40

## 2019-06-26 ASSESSMENT — PAIN SCALES - GENERAL
PAINLEVEL_OUTOF10: 9
PAINLEVEL_OUTOF10: 9
PAINLEVEL_OUTOF10: 7

## 2019-06-26 NOTE — CARE COORDINATION
CM reviewed Discharge order. Dr Say Hoang order IV antibiotics. Steffany from home care here to see. Steffany from [facility] is setting up the home care and home IV.   1040 CM placed a PS to Dr Alexa Cervantes for order for home care. Steffany here to see. Orders received. CM collaborated with nursing. Pt is to be discharged and will have home care with IV antibiotics. CM remains available as needed for any needs or concerns.  [facility]

## 2019-06-26 NOTE — CONSULTS
Infectious Disease Consult Note  2019   Patient Name: Monika Ortega : 1970   Impression   Left 1st MSSA metatarsal osteomyelitis  · Excision of the sinus tract and resection of intramedullary bone abscess utilizing curettage, power lavage and irrigation then packing with OsteoSet beads and vancomycin powder on    MS-CoNS bacteremia  · May be related to mediport, but no blood cultlures were drawn from it. Repeat blood cultures have been negative   Multi-morbidity: per PMHx  Plan:  · D/c vancomycin   · Start cefazolin 2 g q 8 h for 6 week  End date 19, and then oral therapy for possibly 3-6 months depending on treatment response. If treatment fails, will need more debridement or amputation  · Weekly labs drawn on Monday during the course of treatment  · CBC, CMP, ESR, CRP,   · Fax results to Attn: Kemar Feliz Infectious Diseases Staff  · # [phone]  · Monitor response  · See in 2 weeks    Thank you for allowing me to consult in the care of this patient.  ------------------------  REASON FOR CONSULT: Infective syndrome   Requested by: Dr. Faisal Turner is a 46 y.o. 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 She was discharged from the ED and then recalled to be admitted because of CoNS bacteremia , . Repeat blood cultures 6/15 and  are negative. Seen by Dr. Tess Avery and had a excision of left metatarsal sinus tract on .   ?  Infectious diseases service was consulted to evaluate the pt, and recommend further investigative and therapeutic measures. ROS: Other systems reviewed Including eyes, ENT, respiratory, cardiovascular, GI, , dermatologic, neurologic, psych, hem/lymphatic, musculoskeletal and endocrine were negative other than what is mentioned above.      Patient Active Problem List    Diagnosis

## 2019-06-26 NOTE — PROGRESS NOTES
CLINICAL PHARMACY NOTE: MEDS TO [address] Select Patient?: No  Total # of Prescriptions Filled: 1   The following medications were delivered to the patient:  · Oxycodone/apap 5/325mg  Total # of Interventions Completed: 0  Time Spent (min): 15    Additional Documentation:

## 2019-07-01 ENCOUNTER — HOSPITAL ENCOUNTER (OUTPATIENT)
Age: 49
Setting detail: SPECIMEN
Discharge: HOME | End: 2019-07-01
Payer: COMMERCIAL

## 2019-07-01 LAB
ALBUMIN SERPL-MCNC: 4.2 GM/DL (ref 3.4–5)
ALP BLD-CCNC: 128 IU/L (ref 40–128)
ALT SERPL-CCNC: 11 U/L (ref 10–40)
ANION GAP SERPL CALCULATED.3IONS-SCNC: 10 MMOL/L (ref 4–16)
AST SERPL-CCNC: 30 IU/L (ref 15–37)
BASOPHILS ABSOLUTE: 0.1 K/CU MM
BASOPHILS RELATIVE PERCENT: 0.9 % (ref 0–1)
BILIRUB SERPL-MCNC: 0.2 MG/DL (ref 0–1)
BUN BLDV-MCNC: 13 MG/DL (ref 6–23)
C-REACTIVE PROTEIN, HIGH SENSITIVITY: 3.5 MG/L
CALCIUM SERPL-MCNC: 10.4 MG/DL (ref 8.3–10.6)
CHLORIDE BLD-SCNC: 104 MMOL/L (ref 99–110)
CO2: 26 MMOL/L (ref 21–32)
CREAT SERPL-MCNC: 0.6 MG/DL (ref 0.6–1.1)
DIFFERENTIAL TYPE: ABNORMAL
EOSINOPHILS ABSOLUTE: 0.4 K/CU MM
EOSINOPHILS RELATIVE PERCENT: 7.6 % (ref 0–3)
ERYTHROCYTE SEDIMENTATION RATE: 34 MM/HR (ref 0–20)
GFR AFRICAN AMERICAN: >60 ML/MIN/1.73M2
GFR NON-AFRICAN AMERICAN: >60 ML/MIN/1.73M2
GLUCOSE BLD-MCNC: 92 MG/DL (ref 70–99)
HCT VFR BLD CALC: 35.4 % (ref 37–47)
HEMOGLOBIN: 10.4 GM/DL (ref 12.5–16)
IMMATURE NEUTROPHIL %: 0.2 % (ref 0–0.43)
LYMPHOCYTES ABSOLUTE: 2.2 K/CU MM
LYMPHOCYTES RELATIVE PERCENT: 39.6 % (ref 24–44)
MCH RBC QN AUTO: 23.9 PG (ref 27–31)
MCHC RBC AUTO-ENTMCNC: 29.4 % (ref 32–36)
MCV RBC AUTO: 81.4 FL (ref 78–100)
MONOCYTES ABSOLUTE: 0.4 K/CU MM
MONOCYTES RELATIVE PERCENT: 6.3 % (ref 0–4)
NUCLEATED RBC %: 0 %
PDW BLD-RTO: 14.6 % (ref 11.7–14.9)
PLATELET # BLD: 312 K/CU MM (ref 140–440)
PMV BLD AUTO: 10.6 FL (ref 7.5–11.1)
POTASSIUM SERPL-SCNC: 4.9 MMOL/L (ref 3.5–5.1)
RBC # BLD: 4.35 M/CU MM (ref 4.2–5.4)
SEGMENTED NEUTROPHILS ABSOLUTE COUNT: 2.5 K/CU MM
SEGMENTED NEUTROPHILS RELATIVE PERCENT: 45.4 % (ref 36–66)
SODIUM BLD-SCNC: 140 MMOL/L (ref 135–145)
TOTAL IMMATURE NEUTOROPHIL: 0.01 K/CU MM
TOTAL NUCLEATED RBC: 0 K/CU MM
TOTAL PROTEIN: 7 GM/DL (ref 6.4–8.2)
WBC # BLD: 5.6 K/CU MM (ref 4–10.5)

## 2019-07-01 PROCEDURE — 80053 COMPREHEN METABOLIC PANEL: CPT

## 2019-07-01 PROCEDURE — 86140 C-REACTIVE PROTEIN: CPT

## 2019-07-01 PROCEDURE — 85025 COMPLETE CBC W/AUTO DIFF WBC: CPT

## 2019-07-01 PROCEDURE — 85652 RBC SED RATE AUTOMATED: CPT

## 2019-07-06 ENCOUNTER — HOSPITAL ENCOUNTER (EMERGENCY)
Age: 49
Discharge: HOME | End: 2019-07-06
Attending: EMERGENCY MEDICINE
Payer: COMMERCIAL

## 2019-07-06 ENCOUNTER — APPOINTMENT (OUTPATIENT)
Dept: GENERAL RADIOLOGY | Age: 49
End: 2019-07-06
Payer: COMMERCIAL

## 2019-07-06 VITALS
RESPIRATION RATE: 16 BRPM | HEART RATE: 64 BPM | HEIGHT: 64 IN | WEIGHT: 273 LBS | TEMPERATURE: 98.3 F | DIASTOLIC BLOOD PRESSURE: 63 MMHG | BODY MASS INDEX: 46.61 KG/M2 | SYSTOLIC BLOOD PRESSURE: 121 MMHG | OXYGEN SATURATION: 97 %

## 2019-07-06 DIAGNOSIS — R11.2 NON-INTRACTABLE VOMITING WITH NAUSEA, UNSPECIFIED VOMITING TYPE: Primary | ICD-10-CM

## 2019-07-06 DIAGNOSIS — R10.9 ABDOMINAL PAIN, UNSPECIFIED ABDOMINAL LOCATION: ICD-10-CM

## 2019-07-06 DIAGNOSIS — R19.7 DIARRHEA, UNSPECIFIED TYPE: ICD-10-CM

## 2019-07-06 LAB
ALBUMIN SERPL-MCNC: 3.8 GM/DL (ref 3.4–5)
ALP BLD-CCNC: 113 IU/L (ref 40–128)
ALT SERPL-CCNC: 8 U/L (ref 10–40)
ANION GAP SERPL CALCULATED.3IONS-SCNC: 10 MMOL/L (ref 4–16)
AST SERPL-CCNC: 25 IU/L (ref 15–37)
BACTERIA: NEGATIVE /HPF
BASOPHILS ABSOLUTE: 0 K/CU MM
BASOPHILS RELATIVE PERCENT: 0.4 % (ref 0–1)
BILIRUB SERPL-MCNC: 0.3 MG/DL (ref 0–1)
BILIRUBIN URINE: NEGATIVE MG/DL
BLOOD, URINE: NEGATIVE
BUN BLDV-MCNC: 12 MG/DL (ref 6–23)
CALCIUM SERPL-MCNC: 10.4 MG/DL (ref 8.3–10.6)
CHLORIDE BLD-SCNC: 106 MMOL/L (ref 99–110)
CLARITY: CLEAR
CO2: 23 MMOL/L (ref 21–32)
COLOR: YELLOW
CREAT SERPL-MCNC: 0.7 MG/DL (ref 0.6–1.1)
DIFFERENTIAL TYPE: ABNORMAL
EOSINOPHILS ABSOLUTE: 0.4 K/CU MM
EOSINOPHILS RELATIVE PERCENT: 8.3 % (ref 0–3)
GFR AFRICAN AMERICAN: >60 ML/MIN/1.73M2
GFR NON-AFRICAN AMERICAN: >60 ML/MIN/1.73M2
GLUCOSE BLD-MCNC: 97 MG/DL (ref 70–99)
GLUCOSE, URINE: NEGATIVE MG/DL
HCT VFR BLD CALC: 31 % (ref 37–47)
HEMOGLOBIN: 9.2 GM/DL (ref 12.5–16)
IMMATURE NEUTROPHIL %: 0.2 % (ref 0–0.43)
KETONES, URINE: NEGATIVE MG/DL
LEUKOCYTE ESTERASE, URINE: NEGATIVE
LIPASE: 16 IU/L (ref 13–60)
LYMPHOCYTES ABSOLUTE: 1.8 K/CU MM
LYMPHOCYTES RELATIVE PERCENT: 39.2 % (ref 24–44)
MCH RBC QN AUTO: 24.2 PG (ref 27–31)
MCHC RBC AUTO-ENTMCNC: 29.7 % (ref 32–36)
MCV RBC AUTO: 81.6 FL (ref 78–100)
MONOCYTES ABSOLUTE: 0.4 K/CU MM
MONOCYTES RELATIVE PERCENT: 9 % (ref 0–4)
MUCUS: ABNORMAL HPF
NITRITE URINE, QUANTITATIVE: NEGATIVE
NUCLEATED RBC %: 0 %
PDW BLD-RTO: 14.9 % (ref 11.7–14.9)
PH, URINE: 6 (ref 5–8)
PLATELET # BLD: 210 K/CU MM (ref 140–440)
PMV BLD AUTO: 10.2 FL (ref 7.5–11.1)
POTASSIUM SERPL-SCNC: 4.4 MMOL/L (ref 3.5–5.1)
PROTEIN UA: NEGATIVE MG/DL
RBC # BLD: 3.8 M/CU MM (ref 4.2–5.4)
RBC URINE: ABNORMAL /HPF (ref 0–6)
SEGMENTED NEUTROPHILS ABSOLUTE COUNT: 1.9 K/CU MM
SEGMENTED NEUTROPHILS RELATIVE PERCENT: 42.9 % (ref 36–66)
SODIUM BLD-SCNC: 139 MMOL/L (ref 135–145)
SPECIFIC GRAVITY UA: 1.01 (ref 1–1.03)
SQUAMOUS EPITHELIAL: 1 /HPF
TOTAL IMMATURE NEUTOROPHIL: 0.01 K/CU MM
TOTAL NUCLEATED RBC: 0 K/CU MM
TOTAL PROTEIN: 6.7 GM/DL (ref 6.4–8.2)
TRICHOMONAS: ABNORMAL /HPF
TROPONIN T: <0.01 NG/ML
UROBILINOGEN, URINE: NORMAL MG/DL (ref 0.2–1)
WBC # BLD: 4.5 K/CU MM (ref 4–10.5)
WBC UA: 1 /HPF (ref 0–5)

## 2019-07-06 PROCEDURE — 96374 THER/PROPH/DIAG INJ IV PUSH: CPT

## 2019-07-06 PROCEDURE — 96376 TX/PRO/DX INJ SAME DRUG ADON: CPT

## 2019-07-06 PROCEDURE — 6360000002 HC RX W HCPCS: Performed by: PHYSICIAN ASSISTANT

## 2019-07-06 PROCEDURE — 80053 COMPREHEN METABOLIC PANEL: CPT

## 2019-07-06 PROCEDURE — 83690 ASSAY OF LIPASE: CPT

## 2019-07-06 PROCEDURE — 85025 COMPLETE CBC W/AUTO DIFF WBC: CPT

## 2019-07-06 PROCEDURE — 71046 X-RAY EXAM CHEST 2 VIEWS: CPT

## 2019-07-06 PROCEDURE — 84484 ASSAY OF TROPONIN QUANT: CPT

## 2019-07-06 PROCEDURE — 93005 ELECTROCARDIOGRAM TRACING: CPT | Performed by: EMERGENCY MEDICINE

## 2019-07-06 PROCEDURE — 96375 TX/PRO/DX INJ NEW DRUG ADDON: CPT

## 2019-07-06 PROCEDURE — 81001 URINALYSIS AUTO W/SCOPE: CPT

## 2019-07-06 PROCEDURE — 96372 THER/PROPH/DIAG INJ SC/IM: CPT

## 2019-07-06 PROCEDURE — 99284 EMERGENCY DEPT VISIT MOD MDM: CPT

## 2019-07-06 RX ORDER — MORPHINE SULFATE 4 MG/ML
4 INJECTION, SOLUTION INTRAMUSCULAR; INTRAVENOUS ONCE
Status: COMPLETED | OUTPATIENT
Start: 2019-07-06 | End: 2019-07-06

## 2019-07-06 RX ORDER — PROMETHAZINE HYDROCHLORIDE 25 MG/ML
25 INJECTION, SOLUTION INTRAMUSCULAR; INTRAVENOUS ONCE
Status: COMPLETED | OUTPATIENT
Start: 2019-07-06 | End: 2019-07-06

## 2019-07-06 RX ORDER — MORPHINE SULFATE 4 MG/ML
2 INJECTION, SOLUTION INTRAMUSCULAR; INTRAVENOUS ONCE
Status: COMPLETED | OUTPATIENT
Start: 2019-07-06 | End: 2019-07-06

## 2019-07-06 RX ORDER — ONDANSETRON 4 MG/1
4 TABLET, ORALLY DISINTEGRATING ORAL EVERY 8 HOURS PRN
Qty: 15 TABLET | Refills: 0 | Status: ON HOLD | OUTPATIENT
Start: 2019-07-06 | End: 2019-07-23 | Stop reason: SDUPTHER

## 2019-07-06 RX ORDER — ONDANSETRON 2 MG/ML
4 INJECTION INTRAMUSCULAR; INTRAVENOUS ONCE
Status: COMPLETED | OUTPATIENT
Start: 2019-07-06 | End: 2019-07-06

## 2019-07-06 RX ADMIN — MORPHINE SULFATE 4 MG: 4 INJECTION INTRAVENOUS at 15:01

## 2019-07-06 RX ADMIN — PROMETHAZINE HYDROCHLORIDE 25 MG: 25 INJECTION INTRAMUSCULAR; INTRAVENOUS at 15:02

## 2019-07-06 RX ADMIN — MORPHINE SULFATE 2 MG: 4 INJECTION INTRAVENOUS at 12:44

## 2019-07-06 RX ADMIN — ONDANSETRON 4 MG: 2 INJECTION INTRAMUSCULAR; INTRAVENOUS at 12:44

## 2019-07-06 RX ADMIN — MORPHINE SULFATE 2 MG: 4 INJECTION INTRAVENOUS at 13:37

## 2019-07-06 ASSESSMENT — PAIN DESCRIPTION - ORIENTATION: ORIENTATION: RIGHT;UPPER

## 2019-07-06 ASSESSMENT — PAIN SCALES - GENERAL
PAINLEVEL_OUTOF10: 4
PAINLEVEL_OUTOF10: 7
PAINLEVEL_OUTOF10: 6
PAINLEVEL_OUTOF10: 7
PAINLEVEL_OUTOF10: 8
PAINLEVEL_OUTOF10: 6

## 2019-07-06 ASSESSMENT — PAIN DESCRIPTION - DESCRIPTORS: DESCRIPTORS: CONSTANT

## 2019-07-06 ASSESSMENT — PAIN DESCRIPTION - PAIN TYPE
TYPE: ACUTE PAIN
TYPE: ACUTE PAIN

## 2019-07-06 ASSESSMENT — PAIN DESCRIPTION - LOCATION
LOCATION: ABDOMEN
LOCATION: ABDOMEN

## 2019-07-06 NOTE — ED PROVIDER NOTES
eMERGENCY dEPARTMENT eNCOUnter      PCP: Yehuda Deshpande MD    CHIEF COMPLAINT    Chief Complaint   Patient presents with    Abdominal Pain    Emesis       HPI    Brain Munguia is a 46 y.o. female past surgical history of lap or scopic gastric sleeve in February 2019 by  who presents with abdominal pain, nausea, vomiting. Onset -yesterday  Location -epigastric radiating to her right upper quadrant into the right side of her back  Duration -constant  Character -aching  Aggravating/Alleviating factors -aggravating factors are eating, drinking. Alleviating factors are denied. Patient reports that she has been taking Phenergan and Norco at home but is only intermittently keeping down the Norco and Phenergan. Associate symptoms -   Patient reports that around 2 AM this morning she began to have yellow diarrhea and several episodes of nonbloody emesis. Radiation -right upper quadrant, right-side of back, lower part of chest  Severity - 8/10  Temporal-patient reports her symptoms are the same as when she has had \"pancreatitis in the past.\"    Of note patient reports that she is currently on IV antibiotics at home for recent osteomyelitis of her foot and follows with Dr. Isela Queen. She reports that her foot is doing well. Patient denies fever, chills, urinary symptoms, vaginal symptoms. REVIEW OF SYSTEMS    Constitutional:  Denies fever, chills  HENT:  Denies sore throat or ear pain   Cardiovascular:  + chest pain; Denies palpitations or swelling   Respiratory:  Denies cough or shortness of breath   GI:  See HPI above  : No hematuria or dysuria. No vaginal symptoms. Musculoskeletal:  Denies back pain or groin pain or masses. No pain or swelling of extremities.   Skin:  Denies rash  Neurologic:  Denies headache, focal weakness or sensory changes   Endocrine:  Denies polyuria or polydypsia   Lymphatic:  Denies swollen glands     All other review of systems are negative  See HPI and nursing notes for  promethazine (PHENERGAN) 25 MG tablet Take 1 tablet by mouth every 6 hours as needed for Nausea 35 tablet 3    pantoprazole (PROTONIX) 20 MG tablet Take 2 tablets by mouth 2 times daily 60 tablet 0    HYDROcodone-acetaminophen (NORCO) 7.5-325 MG per tablet Take 1 tablet by mouth every 6 hours as needed for Pain.        estradiol (ESTRACE) 0.5 MG tablet Take 0.5 mg by mouth daily      dicyclomine (BENTYL) 20 MG tablet Take 20 mg by mouth every 6 hours      atenolol (TENORMIN) 25 MG tablet Take 25 mg by mouth daily      cloNIDine (CATAPRES) 0.1 MG tablet Take 0.1 mg by mouth 2 times daily      Multiple Vitamin (MVI, BARIATRIC ADVANTAGE MULTI-FORMULA, CHEW TAB) Take 1 tablet by mouth daily 30 tablet 5    Calcium Citrate-Vitamin D (CALCIUM + VIT D, BARIATRIC ADVANTAGE, CHEWABLE TABLET) Take 1 tablet by mouth 2 times daily 120 tablet 5    levothyroxine (SYNTHROID) 125 MCG tablet Take 1 tablet by mouth Daily (Patient taking differently: Take 125 mcg by mouth nightly ) 30 tablet 0    gabapentin (NEURONTIN) 800 MG tablet Take 800 mg by mouth 3 times daily      PARoxetine (PAXIL) 30 MG tablet Take 30 mg by mouth nightly          ALLERGIES    Allergies   Allergen Reactions    Aspirin Palpitations     \"My Heart Rate Elevates\"    Compazine [Prochlorperazine Maleate] Rash    Reglan [Metoclopramide Hcl] Rash    Shellfish-Derived Products Swelling    Toradol [Ketorolac Tromethamine] Rash       SOCIAL AND FAMILY HISTORY    Social History     Socioeconomic History    Marital status:      Spouse name: None    Number of children: None    Years of education: None    Highest education level: None   Occupational History    None   Social Needs    Financial resource strain: None    Food insecurity:     Worry: None     Inability: None    Transportation needs:     Medical: None     Non-medical: None   Tobacco Use    Smoking status: Never Smoker    Smokeless tobacco: Never Used   Substance and Sexual abdominal tenderness to palpation-no other abdominal tenderness to palpation, no rebound tenderness, no palpable pulsatile masses,   No McBurney's point tenderness   Negative Rovsing sign    Negative Champion's sign. Back:   No CVA tenderness to percussion.   Musculoskeletal:  No edema, no deformity  Vascular: DP pulses 2+ equal bilaterally  Integument: No rash, dry skin  Neurologic:  Alert & oriented, normal speech  Psychiatric: Cooperative, pleasant affect       LABS:  Results for orders placed or performed during the hospital encounter of 07/06/19   CBC Auto Differential   Result Value Ref Range    WBC 4.5 4.0 - 10.5 K/CU MM    RBC 3.80 (L) 4.2 - 5.4 M/CU MM    Hemoglobin 9.2 (L) 12.5 - 16.0 GM/DL    Hematocrit 31.0 (L) 37 - 47 %    MCV 81.6 78 - 100 FL    MCH 24.2 (L) 27 - 31 PG    MCHC 29.7 (L) 32.0 - 36.0 %    RDW 14.9 11.7 - 14.9 %    Platelets 318 071 - 750 K/CU MM    MPV 10.2 7.5 - 11.1 FL    Differential Type AUTOMATED DIFFERENTIAL     Segs Relative 42.9 36 - 66 %    Lymphocytes % 39.2 24 - 44 %    Monocytes % 9.0 (H) 0 - 4 %    Eosinophils % 8.3 (H) 0 - 3 %    Basophils % 0.4 0 - 1 %    Segs Absolute 1.9 K/CU MM    Lymphocytes # 1.8 K/CU MM    Monocytes # 0.4 K/CU MM    Eosinophils # 0.4 K/CU MM    Basophils # 0.0 K/CU MM    Nucleated RBC % 0.0 %    Total Nucleated RBC 0.0 K/CU MM    Total Immature Neutrophil 0.01 K/CU MM    Immature Neutrophil % 0.2 0 - 0.43 %   Comprehensive Metabolic Panel   Result Value Ref Range    Sodium 139 135 - 145 MMOL/L    Potassium 4.4 3.5 - 5.1 MMOL/L    Chloride 106 99 - 110 mMol/L    CO2 23 21 - 32 MMOL/L    BUN 12 6 - 23 MG/DL    CREATININE 0.7 0.6 - 1.1 MG/DL    Glucose 97 70 - 99 MG/DL    Calcium 10.4 8.3 - 10.6 MG/DL    Alb 3.8 3.4 - 5.0 GM/DL    Total Protein 6.7 6.4 - 8.2 GM/DL    Total Bilirubin 0.3 0.0 - 1.0 MG/DL    ALT 8 (L) 10 - 40 U/L    AST 25 15 - 37 IU/L    Alkaline Phosphatase 113 40 - 128 IU/L    GFR Non-African American >60 >60 mL/min/1.73m2    GFR African

## 2019-07-07 PROCEDURE — 93010 ELECTROCARDIOGRAM REPORT: CPT | Performed by: INTERNAL MEDICINE

## 2019-07-09 ENCOUNTER — HOSPITAL ENCOUNTER (OUTPATIENT)
Age: 49
Setting detail: SPECIMEN
Discharge: HOME | End: 2019-07-09
Payer: COMMERCIAL

## 2019-07-09 LAB
ALBUMIN SERPL-MCNC: 4.3 GM/DL (ref 3.4–5)
ALP BLD-CCNC: 128 IU/L (ref 40–128)
ALT SERPL-CCNC: 10 U/L (ref 10–40)
ANION GAP SERPL CALCULATED.3IONS-SCNC: 11 MMOL/L (ref 4–16)
AST SERPL-CCNC: 31 IU/L (ref 15–37)
BASOPHILS ABSOLUTE: 0 K/CU MM
BASOPHILS RELATIVE PERCENT: 0.6 % (ref 0–1)
BILIRUB SERPL-MCNC: 0.2 MG/DL (ref 0–1)
BUN BLDV-MCNC: 9 MG/DL (ref 6–23)
C-REACTIVE PROTEIN, HIGH SENSITIVITY: 3.4 MG/L
CALCIUM SERPL-MCNC: 10.6 MG/DL (ref 8.3–10.6)
CHLORIDE BLD-SCNC: 106 MMOL/L (ref 99–110)
CO2: 25 MMOL/L (ref 21–32)
CREAT SERPL-MCNC: 0.6 MG/DL (ref 0.6–1.1)
DIFFERENTIAL TYPE: ABNORMAL
EOSINOPHILS ABSOLUTE: 0.3 K/CU MM
EOSINOPHILS RELATIVE PERCENT: 7 % (ref 0–3)
ERYTHROCYTE SEDIMENTATION RATE: 28 MM/HR (ref 0–20)
GFR AFRICAN AMERICAN: >60 ML/MIN/1.73M2
GFR NON-AFRICAN AMERICAN: >60 ML/MIN/1.73M2
GLUCOSE BLD-MCNC: 100 MG/DL (ref 70–99)
HCT VFR BLD CALC: 34.4 % (ref 37–47)
HEMOGLOBIN: 10.2 GM/DL (ref 12.5–16)
IMMATURE NEUTROPHIL %: 0.2 % (ref 0–0.43)
LYMPHOCYTES ABSOLUTE: 1.9 K/CU MM
LYMPHOCYTES RELATIVE PERCENT: 38.6 % (ref 24–44)
MCH RBC QN AUTO: 24.1 PG (ref 27–31)
MCHC RBC AUTO-ENTMCNC: 29.7 % (ref 32–36)
MCV RBC AUTO: 81.1 FL (ref 78–100)
MONOCYTES ABSOLUTE: 0.3 K/CU MM
MONOCYTES RELATIVE PERCENT: 6.6 % (ref 0–4)
NUCLEATED RBC %: 0 %
PDW BLD-RTO: 15 % (ref 11.7–14.9)
PLATELET # BLD: 229 K/CU MM (ref 140–440)
PMV BLD AUTO: 11.2 FL (ref 7.5–11.1)
POTASSIUM SERPL-SCNC: 4.1 MMOL/L (ref 3.5–5.1)
RBC # BLD: 4.24 M/CU MM (ref 4.2–5.4)
SEGMENTED NEUTROPHILS ABSOLUTE COUNT: 2.3 K/CU MM
SEGMENTED NEUTROPHILS RELATIVE PERCENT: 47 % (ref 36–66)
SODIUM BLD-SCNC: 142 MMOL/L (ref 135–145)
TOTAL IMMATURE NEUTOROPHIL: 0.01 K/CU MM
TOTAL NUCLEATED RBC: 0 K/CU MM
TOTAL PROTEIN: 6.9 GM/DL (ref 6.4–8.2)
WBC # BLD: 4.9 K/CU MM (ref 4–10.5)

## 2019-07-09 PROCEDURE — 85652 RBC SED RATE AUTOMATED: CPT

## 2019-07-09 PROCEDURE — 86140 C-REACTIVE PROTEIN: CPT

## 2019-07-09 PROCEDURE — 85025 COMPLETE CBC W/AUTO DIFF WBC: CPT

## 2019-07-09 PROCEDURE — 80053 COMPREHEN METABOLIC PANEL: CPT

## 2019-07-10 ENCOUNTER — OFFICE VISIT (OUTPATIENT)
Dept: INFECTIOUS DISEASES | Age: 49
End: 2019-07-10
Payer: COMMERCIAL

## 2019-07-10 VITALS
RESPIRATION RATE: 14 BRPM | BODY MASS INDEX: 45.48 KG/M2 | TEMPERATURE: 98.6 F | WEIGHT: 266.4 LBS | DIASTOLIC BLOOD PRESSURE: 82 MMHG | SYSTOLIC BLOOD PRESSURE: 112 MMHG | HEART RATE: 92 BPM | HEIGHT: 64 IN

## 2019-07-10 DIAGNOSIS — Z79.2 RECEIVING INTRAVENOUS ANTIBIOTIC TREATMENT AS OUTPATIENT: ICD-10-CM

## 2019-07-10 DIAGNOSIS — M86.472 CHRONIC OSTEOMYELITIS OF LEFT FOOT WITH DRAINING SINUS (HCC): Primary | ICD-10-CM

## 2019-07-10 PROCEDURE — 99204 OFFICE O/P NEW MOD 45 MIN: CPT | Performed by: INTERNAL MEDICINE

## 2019-07-10 RX ORDER — CEFUROXIME AXETIL 500 MG/1
500 TABLET ORAL 2 TIMES DAILY
Qty: 84 TABLET | Refills: 0 | Status: SHIPPED | OUTPATIENT
Start: 2019-07-10 | End: 2019-08-21 | Stop reason: SDUPTHER

## 2019-07-10 NOTE — PROGRESS NOTES
7/13/2019         Referring Physician: No ref. provider found  Primary Care Physician: Esther Herrmann MD    Impression/Plan:   Diagnosis Orders   1. Chronic osteomyelitis of left foot with draining sinus ([facility])     2. Receiving intravenous antibiotic treatment as outpatient         Discussion:  Chronic osteomyelitis of left foot with draining sinus ([address]  S/p excision of sinus tract on 6/16, wound has healed. Continue IV cefazolin until 7/28/19  Start cefuroxime po on completion        Return in about 3 weeks (around 7/31/2019). History: Tere Whiting is a 46 y.o. obese  female treaed in the hospital for MSSA left 1st metatarsal chronic osteomyelitis, s/p excision of sinus and the resection of intramedullary bone abscess utilizing curettage, power lavage and irrigation then packing with OsteoSet beads and vancomycin powder on 6/16/19. Planned 6 week course of IV abx to end on 7/28/19. She is presenting today for follow up. Denies n/v/d/f, night sweats, rash. Tolerating IV cefazolin. No discharge from the wound presently. Worried about recurrence, since she was treated previously for S aureus osteomyelitis.      Review of Systems    Allergies   Allergen Reactions    Aspirin Palpitations     \"My Heart Rate Elevates\"    Compazine [Prochlorperazine Maleate] Rash    Reglan [Metoclopramide Hcl] Rash    Shellfish-Derived Products Swelling    Toradol [Ketorolac Tromethamine] Rash       Patient Active Problem List   Diagnosis    Rectal bleeding    Fever    Hematemesis    Fibromyalgia    Lupus (systemic lupus erythematosus) (HCC)    Nausea & vomiting    Chest pain    Chronic pancreatitis (HCC)    HTN (hypertension)    Acute pancreatitis    Right-sided chest pain    Super obesity    Normocytic anemia    Hypokalemia    Generalized abdominal pain    Pneumonia of both lungs due to infectious organism    Foot ulcer, left (HCC)    SLE (systemic lupus erythematosus related syndrome) tablet Take 25 mg by mouth daily      cloNIDine (CATAPRES) 0.1 MG tablet Take 0.1 mg by mouth 2 times daily      Multiple Vitamin (MVI, BARIATRIC ADVANTAGE MULTI-FORMULA, CHEW TAB) Take 1 tablet by mouth daily 30 tablet 5    Calcium Citrate-Vitamin D (CALCIUM + VIT D, BARIATRIC ADVANTAGE, CHEWABLE TABLET) Take 1 tablet by mouth 2 times daily 120 tablet 5    levothyroxine (SYNTHROID) 125 MCG tablet Take 1 tablet by mouth Daily (Patient taking differently: Take 125 mcg by mouth nightly ) 30 tablet 0    gabapentin (NEURONTIN) 800 MG tablet Take 800 mg by mouth 3 times daily      PARoxetine (PAXIL) 30 MG tablet Take 30 mg by mouth nightly        No current facility-administered medications for this visit. Past Medical History:   Diagnosis Date    Acid reflux     Anemia     Anxiety     Arthritis     Hands, Back And Ankles    Bleeding ulcer 2014    \"I Had Ulcers In My Stomach And Colon\"    Bronchitis Last Episode 2014    Chronic back pain     [facility]     [facility]    COPD (chronic obstructive pulmonary disease) ([facility]     Sees Dr. Mckenzie Lowe Depression     Fibromyalgia Dx 2013    H/O echocardiogram 8/11/15    EF >55%. LA to be at the upper limit of normal in size. LV hypertrophy with normal LV systolic, but abnormal diastolic function. Normal valvular structures and function.  H/O echocardiogram 08/30/2018    EF 55-60%    Hiatal hernia     History of blood transfusion 09/2015 And 2018    No Reaction To Blood Transfusions Received    [place] (hard of hearing)     Right Ear    Hx of cardiac catheterization 10/24/2010    Angiographically normal coronary arteries w/ normal LV function and wall motion.  Hx of transesophageal echocardiography (PILO) for monitoring 12/2/2010    EF 55-60%. WNL.     Hypertension     Lupus ([facility] Dx 2013    \"Rheumatoid Lupus\"    Morbid obesity ([facility]     Nausea     \"Most Of The Time\"    Pain management     Sees Dr. Lashon Gonzales, Once A Micah Caballero MD at [address] [facility] [place] N/A 6/19/2019    EGD DIAGNOSTIC ONLY performed by Kristie Wiggins MD at [address] [place] [address] History     Socioeconomic History    Marital status:      Spouse name: Not on file    Number of children: Not on file    Years of education: Not on file    Highest education level: Not on file   Occupational History    Not on file   Social Needs    Financial resource strain: Not on file    Food insecurity:     Worry: Not on file     Inability: Not on file    Transportation needs:     Medical: Not on file     Non-medical: Not on file   Tobacco Use    Smoking status: Never Smoker    Smokeless tobacco: Never Used   Substance and Sexual Activity    Alcohol use: No     Alcohol/week: 0.0 standard drinks    Drug use: No    Sexual activity: Never   Lifestyle    Physical activity:     Days per week: Not on file     Minutes per session: Not on file    Stress: Not on file   Relationships    Social connections:     Talks on phone: Not on file     Gets together: Not on file     Attends [employer] service: Not on file     Active member of club or organization: Not on file     Attends meetings of clubs or organizations: Not on file     Relationship status: Not on file    Intimate partner violence:     Fear of current or ex partner: Not on file     Emotionally abused: Not on file     Physically abused: Not on file     Forced sexual activity: Not on file   Other Topics Concern    Not on file   Social History Narrative    Not on file       Family History   Problem Relation Age of Onset    Diabetes Mother         \"Borderline Diabetes\"    High Blood Pressure Mother     Obesity Mother     Arthritis Mother     Heart Disease Mother     High Cholesterol Mother     Vision Loss Mother     Diabetes Father     High Blood Pressure Father     Asthma Father     High Blood Pressure Brother     Asthma Son     Vision Loss Son     Lupus Daughter

## 2019-07-11 LAB
EKG ATRIAL RATE: 59 BPM
EKG DIAGNOSIS: NORMAL
EKG P AXIS: 31 DEGREES
EKG P-R INTERVAL: 192 MS
EKG Q-T INTERVAL: 414 MS
EKG QRS DURATION: 96 MS
EKG QTC CALCULATION (BAZETT): 409 MS
EKG R AXIS: -14 DEGREES
EKG T AXIS: 13 DEGREES
EKG VENTRICULAR RATE: 59 BPM

## 2019-07-14 ENCOUNTER — HOSPITAL ENCOUNTER (INPATIENT)
Age: 49
LOS: 9 days | Discharge: HOME HEALTH | DRG: 270 | End: 2019-07-23
Attending: EMERGENCY MEDICINE | Admitting: FAMILY MEDICINE
Payer: COMMERCIAL

## 2019-07-14 ENCOUNTER — APPOINTMENT (OUTPATIENT)
Dept: CT IMAGING | Age: 49
DRG: 270 | End: 2019-07-14
Payer: COMMERCIAL

## 2019-07-14 ENCOUNTER — APPOINTMENT (OUTPATIENT)
Dept: GENERAL RADIOLOGY | Age: 49
DRG: 270 | End: 2019-07-14
Payer: COMMERCIAL

## 2019-07-14 DIAGNOSIS — K92.0 HEMATEMESIS WITH NAUSEA: ICD-10-CM

## 2019-07-14 DIAGNOSIS — R10.11 ABDOMINAL PAIN, RIGHT UPPER QUADRANT: Primary | ICD-10-CM

## 2019-07-14 DIAGNOSIS — F41.9 ANXIETY: ICD-10-CM

## 2019-07-14 DIAGNOSIS — D64.9 ANEMIA, UNSPECIFIED TYPE: ICD-10-CM

## 2019-07-14 PROBLEM — Z87.898 HISTORY OF BACTEREMIA: Status: ACTIVE | Noted: 2019-07-14

## 2019-07-14 LAB
ALBUMIN SERPL-MCNC: 3.7 GM/DL (ref 3.4–5)
ALP BLD-CCNC: 114 IU/L (ref 40–128)
ALT SERPL-CCNC: 8 U/L (ref 10–40)
ANION GAP SERPL CALCULATED.3IONS-SCNC: 9 MMOL/L (ref 4–16)
APTT: 29.6 SECONDS (ref 21.2–33)
AST SERPL-CCNC: 23 IU/L (ref 15–37)
BACTERIA: ABNORMAL /HPF
BASOPHILS ABSOLUTE: 0 K/CU MM
BASOPHILS RELATIVE PERCENT: 0.7 % (ref 0–1)
BILIRUB SERPL-MCNC: 0.3 MG/DL (ref 0–1)
BILIRUBIN URINE: NEGATIVE MG/DL
BLOOD, URINE: NEGATIVE
BUN BLDV-MCNC: 8 MG/DL (ref 6–23)
CALCIUM SERPL-MCNC: 10.3 MG/DL (ref 8.3–10.6)
CHLORIDE BLD-SCNC: 105 MMOL/L (ref 99–110)
CLARITY: CLEAR
CO2: 26 MMOL/L (ref 21–32)
COLOR: YELLOW
CREAT SERPL-MCNC: 0.7 MG/DL (ref 0.6–1.1)
DIFFERENTIAL TYPE: ABNORMAL
EOSINOPHILS ABSOLUTE: 0.2 K/CU MM
EOSINOPHILS RELATIVE PERCENT: 5.4 % (ref 0–3)
GFR AFRICAN AMERICAN: >60 ML/MIN/1.73M2
GFR NON-AFRICAN AMERICAN: >60 ML/MIN/1.73M2
GLUCOSE BLD-MCNC: 104 MG/DL (ref 70–99)
GLUCOSE, URINE: NEGATIVE MG/DL
HCT VFR BLD CALC: 29.5 % (ref 37–47)
HCT VFR BLD CALC: 29.8 % (ref 37–47)
HEMOGLOBIN: 8.7 GM/DL (ref 12.5–16)
HEMOGLOBIN: 8.9 GM/DL (ref 12.5–16)
HEMOGLOBIN: 9 GM/DL (ref 12.5–16)
HYALINE CASTS: 5 /LPF
IMMATURE NEUTROPHIL %: 0 % (ref 0–0.43)
INR BLD: 1.02 INDEX
KETONES, URINE: NEGATIVE MG/DL
LACTATE: 1.1 MMOL/L (ref 0.4–2)
LACTIC ACID, SEPSIS: 0.6 MMOL/L (ref 0.5–1.9)
LACTIC ACID, SEPSIS: 0.6 MMOL/L (ref 0.5–1.9)
LEUKOCYTE ESTERASE, URINE: NEGATIVE
LIPASE: 11 IU/L (ref 13–60)
LYMPHOCYTES ABSOLUTE: 1 K/CU MM
LYMPHOCYTES RELATIVE PERCENT: 36.3 % (ref 24–44)
MCH RBC QN AUTO: 24.1 PG (ref 27–31)
MCHC RBC AUTO-ENTMCNC: 29.5 % (ref 32–36)
MCV RBC AUTO: 81.7 FL (ref 78–100)
MONOCYTES ABSOLUTE: 0.2 K/CU MM
MONOCYTES RELATIVE PERCENT: 6.5 % (ref 0–4)
MUCUS: ABNORMAL HPF
NITRITE URINE, QUANTITATIVE: NEGATIVE
PDW BLD-RTO: 14.8 % (ref 11.7–14.9)
PH, URINE: 6 (ref 5–8)
PLATELET # BLD: ABNORMAL K/CU MM (ref 140–440)
PMV BLD AUTO: 10.2 FL (ref 7.5–11.1)
POTASSIUM SERPL-SCNC: 4.1 MMOL/L (ref 3.5–5.1)
PROTEIN UA: NEGATIVE MG/DL
PROTHROMBIN TIME: 11.6 SECONDS (ref 9.12–12.5)
RBC # BLD: 3.61 M/CU MM (ref 4.2–5.4)
RBC URINE: <1 /HPF (ref 0–6)
SEGMENTED NEUTROPHILS ABSOLUTE COUNT: 1.4 K/CU MM
SEGMENTED NEUTROPHILS RELATIVE PERCENT: 51.1 % (ref 36–66)
SODIUM BLD-SCNC: 140 MMOL/L (ref 135–145)
SPECIFIC GRAVITY UA: 1.02 (ref 1–1.03)
SQUAMOUS EPITHELIAL: 3 /HPF
TOTAL CK: 38 IU/L (ref 26–140)
TOTAL IMMATURE NEUTOROPHIL: 0 K/CU MM
TOTAL PROTEIN: 6.5 GM/DL (ref 6.4–8.2)
TRICHOMONAS: ABNORMAL /HPF
UROBILINOGEN, URINE: NORMAL MG/DL (ref 0.2–1)
WBC # BLD: 2.8 K/CU MM (ref 4–10.5)
WBC UA: 1 /HPF (ref 0–5)

## 2019-07-14 PROCEDURE — 74176 CT ABD & PELVIS W/O CONTRAST: CPT

## 2019-07-14 PROCEDURE — 96376 TX/PRO/DX INJ SAME DRUG ADON: CPT

## 2019-07-14 PROCEDURE — 85610 PROTHROMBIN TIME: CPT

## 2019-07-14 PROCEDURE — 96375 TX/PRO/DX INJ NEW DRUG ADDON: CPT

## 2019-07-14 PROCEDURE — 83605 ASSAY OF LACTIC ACID: CPT

## 2019-07-14 PROCEDURE — 99222 1ST HOSP IP/OBS MODERATE 55: CPT | Performed by: SURGERY

## 2019-07-14 PROCEDURE — 87040 BLOOD CULTURE FOR BACTERIA: CPT

## 2019-07-14 PROCEDURE — 83690 ASSAY OF LIPASE: CPT

## 2019-07-14 PROCEDURE — 2140000000 HC CCU INTERMEDIATE R&B

## 2019-07-14 PROCEDURE — 2500000003 HC RX 250 WO HCPCS: Performed by: FAMILY MEDICINE

## 2019-07-14 PROCEDURE — C9113 INJ PANTOPRAZOLE SODIUM, VIA: HCPCS | Performed by: SPECIALIST

## 2019-07-14 PROCEDURE — 6360000002 HC RX W HCPCS: Performed by: PHYSICIAN ASSISTANT

## 2019-07-14 PROCEDURE — 80053 COMPREHEN METABOLIC PANEL: CPT

## 2019-07-14 PROCEDURE — 85730 THROMBOPLASTIN TIME PARTIAL: CPT

## 2019-07-14 PROCEDURE — 2580000003 HC RX 258: Performed by: PHYSICIAN ASSISTANT

## 2019-07-14 PROCEDURE — 85018 HEMOGLOBIN: CPT

## 2019-07-14 PROCEDURE — 86850 RBC ANTIBODY SCREEN: CPT

## 2019-07-14 PROCEDURE — 6360000002 HC RX W HCPCS: Performed by: FAMILY MEDICINE

## 2019-07-14 PROCEDURE — 82550 ASSAY OF CK (CPK): CPT

## 2019-07-14 PROCEDURE — C9113 INJ PANTOPRAZOLE SODIUM, VIA: HCPCS | Performed by: PHYSICIAN ASSISTANT

## 2019-07-14 PROCEDURE — 86900 BLOOD TYPING SEROLOGIC ABO: CPT

## 2019-07-14 PROCEDURE — 87150 DNA/RNA AMPLIFIED PROBE: CPT

## 2019-07-14 PROCEDURE — 87186 SC STD MICRODIL/AGAR DIL: CPT

## 2019-07-14 PROCEDURE — 81001 URINALYSIS AUTO W/SCOPE: CPT

## 2019-07-14 PROCEDURE — 86922 COMPATIBILITY TEST ANTIGLOB: CPT

## 2019-07-14 PROCEDURE — 6370000000 HC RX 637 (ALT 250 FOR IP): Performed by: FAMILY MEDICINE

## 2019-07-14 PROCEDURE — 85025 COMPLETE CBC W/AUTO DIFF WBC: CPT

## 2019-07-14 PROCEDURE — 86901 BLOOD TYPING SEROLOGIC RH(D): CPT

## 2019-07-14 PROCEDURE — 96365 THER/PROPH/DIAG IV INF INIT: CPT

## 2019-07-14 PROCEDURE — 2580000003 HC RX 258: Performed by: FAMILY MEDICINE

## 2019-07-14 PROCEDURE — 99285 EMERGENCY DEPT VISIT HI MDM: CPT

## 2019-07-14 PROCEDURE — 73630 X-RAY EXAM OF FOOT: CPT

## 2019-07-14 PROCEDURE — 85014 HEMATOCRIT: CPT

## 2019-07-14 PROCEDURE — 36415 COLL VENOUS BLD VENIPUNCTURE: CPT

## 2019-07-14 PROCEDURE — 96366 THER/PROPH/DIAG IV INF ADDON: CPT

## 2019-07-14 PROCEDURE — 6360000002 HC RX W HCPCS: Performed by: SPECIALIST

## 2019-07-14 PROCEDURE — 87081 CULTURE SCREEN ONLY: CPT

## 2019-07-14 RX ORDER — OMEPRAZOLE 40 MG/1
40 CAPSULE, DELAYED RELEASE ORAL DAILY
Status: ON HOLD | COMMUNITY
End: 2019-07-23 | Stop reason: HOSPADM

## 2019-07-14 RX ORDER — ONDANSETRON 2 MG/ML
4 INJECTION INTRAMUSCULAR; INTRAVENOUS EVERY 30 MIN PRN
Status: DISCONTINUED | OUTPATIENT
Start: 2019-07-14 | End: 2019-07-14 | Stop reason: ALTCHOICE

## 2019-07-14 RX ORDER — HYDROCODONE BITARTRATE AND ACETAMINOPHEN 5; 325 MG/1; MG/1
1 TABLET ORAL ONCE
Status: DISCONTINUED | OUTPATIENT
Start: 2019-07-14 | End: 2019-07-14

## 2019-07-14 RX ORDER — ONDANSETRON 2 MG/ML
4 INJECTION INTRAMUSCULAR; INTRAVENOUS EVERY 6 HOURS PRN
Status: DISCONTINUED | OUTPATIENT
Start: 2019-07-14 | End: 2019-07-23 | Stop reason: HOSPADM

## 2019-07-14 RX ORDER — DEXTROSE AND SODIUM CHLORIDE 5; .9 G/100ML; G/100ML
INJECTION, SOLUTION INTRAVENOUS CONTINUOUS
Status: DISCONTINUED | OUTPATIENT
Start: 2019-07-14 | End: 2019-07-14

## 2019-07-14 RX ORDER — SODIUM CHLORIDE 0.9 % (FLUSH) 0.9 %
10 SYRINGE (ML) INJECTION EVERY 12 HOURS SCHEDULED
Status: DISCONTINUED | OUTPATIENT
Start: 2019-07-14 | End: 2019-07-14 | Stop reason: SDUPTHER

## 2019-07-14 RX ORDER — 0.9 % SODIUM CHLORIDE 0.9 %
250 INTRAVENOUS SOLUTION INTRAVENOUS ONCE
Status: DISCONTINUED | OUTPATIENT
Start: 2019-07-14 | End: 2019-07-23 | Stop reason: HOSPADM

## 2019-07-14 RX ORDER — LEVOTHYROXINE SODIUM 0.12 MG/1
125 TABLET ORAL DAILY
Status: DISCONTINUED | OUTPATIENT
Start: 2019-07-14 | End: 2019-07-23 | Stop reason: HOSPADM

## 2019-07-14 RX ORDER — DEXTROSE, SODIUM CHLORIDE, AND POTASSIUM CHLORIDE 5; .45; .15 G/100ML; G/100ML; G/100ML
INJECTION INTRAVENOUS CONTINUOUS
Status: DISCONTINUED | OUTPATIENT
Start: 2019-07-14 | End: 2019-07-23 | Stop reason: HOSPADM

## 2019-07-14 RX ORDER — OYSTER SHELL CALCIUM WITH VITAMIN D 500; 200 MG/1; [IU]/1
1 TABLET, FILM COATED ORAL 2 TIMES DAILY
Status: DISCONTINUED | OUTPATIENT
Start: 2019-07-14 | End: 2019-07-23 | Stop reason: HOSPADM

## 2019-07-14 RX ORDER — M-VIT,TX,IRON,MINS/CALC/FOLIC 27MG-0.4MG
1 TABLET ORAL DAILY
Status: DISCONTINUED | OUTPATIENT
Start: 2019-07-14 | End: 2019-07-23 | Stop reason: HOSPADM

## 2019-07-14 RX ORDER — PANTOPRAZOLE SODIUM 40 MG/10ML
40 INJECTION, POWDER, LYOPHILIZED, FOR SOLUTION INTRAVENOUS ONCE
Status: COMPLETED | OUTPATIENT
Start: 2019-07-14 | End: 2019-07-14

## 2019-07-14 RX ORDER — DICYCLOMINE HCL 20 MG
20 TABLET ORAL EVERY 6 HOURS
Status: DISCONTINUED | OUTPATIENT
Start: 2019-07-14 | End: 2019-07-23 | Stop reason: HOSPADM

## 2019-07-14 RX ORDER — MORPHINE SULFATE 4 MG/ML
4 INJECTION, SOLUTION INTRAMUSCULAR; INTRAVENOUS
Status: DISCONTINUED | OUTPATIENT
Start: 2019-07-14 | End: 2019-07-16

## 2019-07-14 RX ORDER — SODIUM CHLORIDE 0.9 % (FLUSH) 0.9 %
10 SYRINGE (ML) INJECTION PRN
Status: DISCONTINUED | OUTPATIENT
Start: 2019-07-14 | End: 2019-07-23 | Stop reason: HOSPADM

## 2019-07-14 RX ORDER — SODIUM CHLORIDE 0.9 % (FLUSH) 0.9 %
10 SYRINGE (ML) INJECTION PRN
Status: DISCONTINUED | OUTPATIENT
Start: 2019-07-14 | End: 2019-07-14 | Stop reason: SDUPTHER

## 2019-07-14 RX ORDER — PANTOPRAZOLE SODIUM 40 MG/10ML
40 INJECTION, POWDER, LYOPHILIZED, FOR SOLUTION INTRAVENOUS 2 TIMES DAILY
Status: DISCONTINUED | OUTPATIENT
Start: 2019-07-14 | End: 2019-07-18 | Stop reason: SDUPTHER

## 2019-07-14 RX ORDER — PANTOPRAZOLE SODIUM 40 MG/10ML
40 INJECTION, POWDER, LYOPHILIZED, FOR SOLUTION INTRAVENOUS DAILY
Status: DISCONTINUED | OUTPATIENT
Start: 2019-07-17 | End: 2019-07-14

## 2019-07-14 RX ORDER — MORPHINE SULFATE 4 MG/ML
2 INJECTION, SOLUTION INTRAMUSCULAR; INTRAVENOUS
Status: DISCONTINUED | OUTPATIENT
Start: 2019-07-14 | End: 2019-07-16

## 2019-07-14 RX ORDER — ACETAMINOPHEN 325 MG/1
650 TABLET ORAL EVERY 4 HOURS PRN
Status: DISCONTINUED | OUTPATIENT
Start: 2019-07-14 | End: 2019-07-14 | Stop reason: SDUPTHER

## 2019-07-14 RX ORDER — SODIUM CHLORIDE 0.9 % (FLUSH) 0.9 %
10 SYRINGE (ML) INJECTION EVERY 12 HOURS SCHEDULED
Status: DISCONTINUED | OUTPATIENT
Start: 2019-07-14 | End: 2019-07-20 | Stop reason: SDUPTHER

## 2019-07-14 RX ORDER — HYDROCODONE BITARTRATE AND ACETAMINOPHEN 7.5; 325 MG/1; MG/1
1 TABLET ORAL EVERY 6 HOURS PRN
Status: DISCONTINUED | OUTPATIENT
Start: 2019-07-14 | End: 2019-07-23 | Stop reason: HOSPADM

## 2019-07-14 RX ORDER — GABAPENTIN 400 MG/1
800 CAPSULE ORAL 3 TIMES DAILY
Status: DISCONTINUED | OUTPATIENT
Start: 2019-07-14 | End: 2019-07-23 | Stop reason: HOSPADM

## 2019-07-14 RX ORDER — FERROUS SULFATE 325(65) MG
325 TABLET ORAL 2 TIMES DAILY
Status: DISCONTINUED | OUTPATIENT
Start: 2019-07-14 | End: 2019-07-23 | Stop reason: HOSPADM

## 2019-07-14 RX ORDER — HYDROMORPHONE HCL 110MG/55ML
0.5 PATIENT CONTROLLED ANALGESIA SYRINGE INTRAVENOUS EVERY 4 HOURS PRN
Status: DISCONTINUED | OUTPATIENT
Start: 2019-07-14 | End: 2019-07-16

## 2019-07-14 RX ORDER — ESTRADIOL 1 MG/1
0.5 TABLET ORAL DAILY
Status: DISCONTINUED | OUTPATIENT
Start: 2019-07-14 | End: 2019-07-23 | Stop reason: HOSPADM

## 2019-07-14 RX ORDER — 0.9 % SODIUM CHLORIDE 0.9 %
10 VIAL (ML) INJECTION DAILY
Status: DISCONTINUED | OUTPATIENT
Start: 2019-07-17 | End: 2019-07-23 | Stop reason: HOSPADM

## 2019-07-14 RX ORDER — CLONIDINE HYDROCHLORIDE 0.1 MG/1
0.1 TABLET ORAL 2 TIMES DAILY
Status: DISCONTINUED | OUTPATIENT
Start: 2019-07-14 | End: 2019-07-23 | Stop reason: HOSPADM

## 2019-07-14 RX ORDER — MORPHINE SULFATE 4 MG/ML
4 INJECTION, SOLUTION INTRAMUSCULAR; INTRAVENOUS EVERY 30 MIN PRN
Status: DISCONTINUED | OUTPATIENT
Start: 2019-07-14 | End: 2019-07-14 | Stop reason: ALTCHOICE

## 2019-07-14 RX ORDER — ATENOLOL 25 MG/1
25 TABLET ORAL DAILY
Status: DISCONTINUED | OUTPATIENT
Start: 2019-07-14 | End: 2019-07-23 | Stop reason: HOSPADM

## 2019-07-14 RX ORDER — ACETAMINOPHEN 325 MG/1
650 TABLET ORAL EVERY 4 HOURS PRN
Status: DISCONTINUED | OUTPATIENT
Start: 2019-07-14 | End: 2019-07-23 | Stop reason: HOSPADM

## 2019-07-14 RX ORDER — CEFAZOLIN SODIUM 2 G/100ML
2 INJECTION, SOLUTION INTRAVENOUS EVERY 8 HOURS
Status: DISCONTINUED | OUTPATIENT
Start: 2019-07-14 | End: 2019-07-14

## 2019-07-14 RX ADMIN — PANTOPRAZOLE SODIUM 40 MG: 40 INJECTION, POWDER, FOR SOLUTION INTRAVENOUS at 07:46

## 2019-07-14 RX ADMIN — CEFEPIME HYDROCHLORIDE 2 G: 2 INJECTION, POWDER, FOR SOLUTION INTRAVENOUS at 19:04

## 2019-07-14 RX ADMIN — MORPHINE SULFATE 4 MG: 4 INJECTION, SOLUTION INTRAMUSCULAR; INTRAVENOUS at 07:47

## 2019-07-14 RX ADMIN — ONDANSETRON 4 MG: 2 INJECTION INTRAMUSCULAR; INTRAVENOUS at 12:36

## 2019-07-14 RX ADMIN — MORPHINE SULFATE 4 MG: 4 INJECTION, SOLUTION INTRAMUSCULAR; INTRAVENOUS at 10:18

## 2019-07-14 RX ADMIN — SODIUM CHLORIDE 8 MG/HR: 9 INJECTION, SOLUTION INTRAVENOUS at 12:41

## 2019-07-14 RX ADMIN — PANTOPRAZOLE SODIUM 40 MG: 40 INJECTION, POWDER, FOR SOLUTION INTRAVENOUS at 21:00

## 2019-07-14 RX ADMIN — ONDANSETRON 4 MG: 2 INJECTION INTRAMUSCULAR; INTRAVENOUS at 07:47

## 2019-07-14 RX ADMIN — MORPHINE SULFATE 4 MG: 4 INJECTION, SOLUTION INTRAMUSCULAR; INTRAVENOUS at 17:17

## 2019-07-14 RX ADMIN — MORPHINE SULFATE 2 MG: 4 INJECTION INTRAVENOUS at 15:32

## 2019-07-14 RX ADMIN — GABAPENTIN 800 MG: 400 CAPSULE ORAL at 20:59

## 2019-07-14 RX ADMIN — FERROUS SULFATE TAB 325 MG (65 MG ELEMENTAL FE) 325 MG: 325 (65 FE) TAB at 20:59

## 2019-07-14 RX ADMIN — ACETAMINOPHEN 650 MG: 325 TABLET ORAL at 17:09

## 2019-07-14 RX ADMIN — POTASSIUM CHLORIDE, DEXTROSE MONOHYDRATE AND SODIUM CHLORIDE: 150; 5; 450 INJECTION, SOLUTION INTRAVENOUS at 19:04

## 2019-07-14 RX ADMIN — CLONIDINE HYDROCHLORIDE 0.1 MG: 0.1 TABLET ORAL at 20:58

## 2019-07-14 RX ADMIN — DICYCLOMINE HYDROCHLORIDE 20 MG: 20 TABLET ORAL at 21:09

## 2019-07-14 RX ADMIN — VANCOMYCIN HYDROCHLORIDE 1750 MG: 5 INJECTION, POWDER, LYOPHILIZED, FOR SOLUTION INTRAVENOUS at 20:57

## 2019-07-14 RX ADMIN — SODIUM CHLORIDE 40 MG: 9 INJECTION, SOLUTION INTRAVENOUS at 12:13

## 2019-07-14 RX ADMIN — MORPHINE SULFATE 4 MG: 4 INJECTION, SOLUTION INTRAMUSCULAR; INTRAVENOUS at 08:55

## 2019-07-14 RX ADMIN — SODIUM CHLORIDE, PRESERVATIVE FREE 10 ML: 5 INJECTION INTRAVENOUS at 21:00

## 2019-07-14 RX ADMIN — ONDANSETRON 4 MG: 2 INJECTION INTRAMUSCULAR; INTRAVENOUS at 06:58

## 2019-07-14 RX ADMIN — MORPHINE SULFATE 4 MG: 4 INJECTION, SOLUTION INTRAMUSCULAR; INTRAVENOUS at 21:00

## 2019-07-14 RX ADMIN — MORPHINE SULFATE 4 MG: 4 INJECTION, SOLUTION INTRAMUSCULAR; INTRAVENOUS at 12:36

## 2019-07-14 ASSESSMENT — ENCOUNTER SYMPTOMS
CONSTIPATION: 0
APNEA: 0
EYE ITCHING: 0
COLOR CHANGE: 0
STRIDOR: 0
BACK PAIN: 0
PHOTOPHOBIA: 0
EYE REDNESS: 0
RECTAL PAIN: 0
ABDOMINAL PAIN: 1
CHOKING: 0
ANAL BLEEDING: 0
SORE THROAT: 0

## 2019-07-14 ASSESSMENT — PAIN DESCRIPTION - PROGRESSION
CLINICAL_PROGRESSION: NOT CHANGED

## 2019-07-14 ASSESSMENT — PAIN SCALES - GENERAL
PAINLEVEL_OUTOF10: 8
PAINLEVEL_OUTOF10: 10
PAINLEVEL_OUTOF10: 10
PAINLEVEL_OUTOF10: 9
PAINLEVEL_OUTOF10: 8

## 2019-07-14 ASSESSMENT — PAIN DESCRIPTION - DESCRIPTORS
DESCRIPTORS: ACHING;CONSTANT
DESCRIPTORS: ACHING;CONSTANT;DISCOMFORT
DESCRIPTORS: CONSTANT;ACHING
DESCRIPTORS: ACHING;CONSTANT

## 2019-07-14 ASSESSMENT — PAIN DESCRIPTION - ORIENTATION
ORIENTATION: MID

## 2019-07-14 ASSESSMENT — PAIN DESCRIPTION - PAIN TYPE
TYPE: ACUTE PAIN

## 2019-07-14 ASSESSMENT — PAIN DESCRIPTION - ONSET
ONSET: ON-GOING

## 2019-07-14 ASSESSMENT — PAIN - FUNCTIONAL ASSESSMENT
PAIN_FUNCTIONAL_ASSESSMENT: PREVENTS OR INTERFERES SOME ACTIVE ACTIVITIES AND ADLS

## 2019-07-14 ASSESSMENT — PAIN DESCRIPTION - FREQUENCY
FREQUENCY: CONTINUOUS

## 2019-07-14 ASSESSMENT — PAIN DESCRIPTION - LOCATION
LOCATION: ABDOMEN
LOCATION: ABDOMEN
LOCATION: ABDOMEN;HEAD
LOCATION: ABDOMEN

## 2019-07-14 NOTE — ED NOTES
Called and gave report to [facility] on [employer]; [facility] [address] [facility] [address] [facility]  07/14/19 7054

## 2019-07-14 NOTE — ED PROVIDER NOTES
I independently examined and evaluated Andre Pope. In brief their history revealed chronic abdominal pain. States became unbareable yesterday. States nausea with vomiting, cannot hold anything down. States now with hematemesis this morning. No anticoagulants. Their focused exam revealed awake, alert, well-hydrated, well-nourished, and in no acute distress. Mucous membranes are moist. Speech is clear. Breathing is unlabored. Skin is dry. Mental status is normal. The patient has normal gait, moves all extremities, and is without facial droop. ED course: 70-year-old female with chronic abdominal pain, history of weight loss surgery who presented with chronic abdominal pain, nausea and vomiting. Now with hematemesis this morning. Hemoglobin 8.7 down from 10.2. Did have hematemesis in the emergency department. Plan for imaging, coag studies and admission for further observation and care. All diagnostic, treatment, and disposition decisions were made by myself in conjunction with the Advanced Practice Provider. For all further details of the patient's emergency department visit, please see the Advanced Practice Provider's documentation.       Karey Bailon DO  07/14/19 3068

## 2019-07-14 NOTE — ED PROVIDER NOTES
ferrous sulfate (FE TABS) 325 (65 Fe) MG EC tablet Take 1 tablet by mouth 2 times daily 6/25/19  Yes Rhea Aleman MD   tiZANidine (ZANAFLEX) 4 MG tablet Take 4 mg by mouth 2 times daily   Yes Historical Provider, MD   promethazine (PHENERGAN) 25 MG tablet Take 1 tablet by mouth every 6 hours as needed for Nausea 4/17/19  Yes Rhea Aleman MD   pantoprazole (PROTONIX) 20 MG tablet Take 2 tablets by mouth 2 times daily 4/4/19  Yes Christiano Gaxiola MD   HYDROcodone-acetaminophen ([facility]) 7.5-325 MG per tablet Take 1 tablet by mouth every 6 hours as needed for Pain. Yes Historical Provider, MD   estradiol (ESTRACE) 0.5 MG tablet Take 0.5 mg by mouth daily   Yes Historical Provider, MD   dicyclomine (BENTYL) 20 MG tablet Take 20 mg by mouth every 6 hours   Yes Historical Provider, MD   atenolol (TENORMIN) 25 MG tablet Take 25 mg by mouth daily   Yes Historical Provider, MD   cloNIDine (CATAPRES) 0.1 MG tablet Take 0.1 mg by mouth 2 times daily   Yes Historical Provider, MD   Multiple Vitamin (MVI, BARIATRIC ADVANTAGE MULTI-FORMULA, CHEW TAB) Take 1 tablet by mouth daily 2/22/19  Yes Rhea Aleman MD   Calcium Citrate-Vitamin D (CALCIUM + VIT D, BARIATRIC ADVANTAGE, CHEWABLE TABLET) Take 1 tablet by mouth 2 times daily 2/22/19  Yes Rhea Aleman MD   levothyroxine (SYNTHROID) 125 MCG tablet Take 1 tablet by mouth Daily  Patient taking differently: Take 125 mcg by mouth nightly  8/8/18  Yes Christiano Gaxiola MD   gabapentin (NEURONTIN) 800 MG tablet Take 800 mg by mouth 3 times daily   Yes Historical Provider, MD   PARoxetine (PAXIL) 30 MG tablet Take 30 mg by mouth nightly    Yes Historical Provider, MD   cefUROXime (CEFTIN) 500 MG tablet Take 1 tablet by mouth 2 times daily Start on 7/29/19 7/10/19 8/21/19  Daksha Garcia MD       Social history:  reports that she has never smoked. She has never used smokeless tobacco. She reports that she does not drink alcohol or use drugs.     Family history:    Family Result   Stable exam.  No new acute abdominal or pelvic abnormality on this unenhanced   study.                 Labs  Results for orders placed or performed during the hospital encounter of 07/14/19   CBC Auto Differential   Result Value Ref Range    WBC 2.8 (L) 4.0 - 10.5 K/CU MM    RBC 3.61 (L) 4.2 - 5.4 M/CU MM    Hemoglobin 8.7 (L) 12.5 - 16.0 GM/DL    Hematocrit 29.5 (L) 37 - 47 %    MCV 81.7 78 - 100 FL    MCH 24.1 (L) 27 - 31 PG    MCHC 29.5 (L) 32.0 - 36.0 %    RDW 14.8 11.7 - 14.9 %    Platelets 702  RESULTS CONFIRMED BY SMEAR REVIEW   140 - 440 K/CU MM    MPV 10.2 7.5 - 11.1 FL    Immature Neutrophil % 0.0 0 - 0.43 %    Segs Relative 51.1 36 - 66 %    Eosinophils % 5.4 (H) 0 - 3 %    Basophils % 0.7 0 - 1 %    Lymphocytes % 36.3 24 - 44 %    Monocytes % 6.5 (H) 0 - 4 %    Total Immature Neutrophil 0.00 K/CU MM    Segs Absolute 1.4 K/CU MM    Eosinophils # 0.2 K/CU MM    Basophils # 0.0 K/CU MM    Lymphocytes # 1.0 K/CU MM    Monocytes # 0.2 K/CU MM    Differential Type AUTOMATED DIFFERENTIAL    CMP   Result Value Ref Range    Sodium 140 135 - 145 MMOL/L    Potassium 4.1 3.5 - 5.1 MMOL/L    Chloride 105 99 - 110 mMol/L    CO2 26 21 - 32 MMOL/L    BUN 8 6 - 23 MG/DL    CREATININE 0.7 0.6 - 1.1 MG/DL    Glucose 104 (H) 70 - 99 MG/DL    Calcium 10.3 8.3 - 10.6 MG/DL    Alb 3.7 3.4 - 5.0 GM/DL    Total Protein 6.5 6.4 - 8.2 GM/DL    Total Bilirubin 0.3 0.0 - 1.0 MG/DL    ALT 8 (L) 10 - 40 U/L    AST 23 15 - 37 IU/L    Alkaline Phosphatase 114 40 - 128 IU/L    GFR Non-African American >60 >60 mL/min/1.73m2    GFR African American >60 >60 mL/min/1.73m2    Anion Gap 9 4 - 16   Lipase   Result Value Ref Range    Lipase 11 (L) 13 - 60 IU/L   Urinalysis   Result Value Ref Range    Color, UA YELLOW YELLOW    Clarity, UA CLEAR CLEAR    Glucose, Urine NEGATIVE NEGATIVE MG/DL    Bilirubin Urine NEGATIVE NEGATIVE MG/DL    Ketones, Urine NEGATIVE NEGATIVE MG/DL    Specific Gravity, UA 1.018 1.001 - 1.035    Blood, Urine

## 2019-07-14 NOTE — CONSULTS
Other Topics Concern    None   Social History Narrative    None       Family History:   Family History   Problem Relation Age of Onset    Diabetes Mother         \"Borderline Diabetes\"    High Blood Pressure Mother     Obesity Mother     Arthritis Mother     Heart Disease Mother     High Cholesterol Mother     Vision Loss Mother     Diabetes Father     High Blood Pressure Father     Asthma Father     High Blood Pressure Brother     Asthma Son     Vision Loss Son     Lupus Daughter     Other Daughter         \"Alot Of Female Problems\"       REVIEW OFSYSTEMS:    Review of Systems   Constitutional: Positive for fatigue. Negative for chills and fever. HENT: Negative for ear pain, mouth sores, sore throat and tinnitus. Eyes: Negative for photophobia, redness and itching. Respiratory: Negative for apnea, choking and stridor. Cardiovascular: Negative for chest pain and palpitations. Gastrointestinal: Positive for abdominal pain. Negative for anal bleeding, constipation and rectal pain. Endocrine: Negative for polydipsia. Genitourinary: Negative for enuresis, flank pain and hematuria. Musculoskeletal: Positive for arthralgias and gait problem. Negative for back pain, joint swelling and myalgias. Skin: Negative for color change and pallor. Allergic/Immunologic: Negative for environmental allergies. Neurological: Negative for syncope and speech difficulty. Psychiatric/Behavioral: Negative for confusion and hallucinations. PHYSICAL EXAM:  Vitals:    07/14/19 0702 07/14/19 0703 07/14/19 0757 07/14/19 0858   BP: (!) 123/101   132/86   Pulse:  88 76 81   Resp:   16 15   Temp:  98.9 °F (37.2 °C)  99.4 °F (37.4 °C)   TempSrc:  Oral  Oral   SpO2: 97%  98% 100%   Weight:  264 lb (119.7 kg)     Height:  5' 4\" (1.626 m)         Physical Exam   Constitutional: She is oriented to person, place, and time. She appears well-developed and well-nourished. HENT:   Head: Normocephalic.    Eyes: Normocytic anemia     Hypokalemia     Generalized abdominal pain     Pneumonia of both lungs due to infectious organism     Foot ulcer, left (HCC)     SLE (systemic lupus erythematosus related syndrome) (HCC)     Hypoxia     Cellulitis     Pancytopenia (HCC)     Pleurisy     Frequent UTI     Gastroesophageal reflux disease without esophagitis     MRSA cellulitis of left foot     Depression     Fatty liver     Fatty liver disease, nonalcoholic     Arthritis     Bilateral low back pain with left-sided sciatica     Morbid obesity with BMI of 50.0-59.9, adult (HCC)     Intractable vomiting with nausea     Class 3 obesity in adult     Hiatal hernia     Poor intravenous access     Post-operative nausea and vomiting     Status post bariatric surgery     Status post laparoscopic sleeve gastrectomy     Abscess     Dysphagia     Pseudoseizure     Chronic pain syndrome     Drug-seeking/Aberrant behavior     Acute conjunctivitis     Chronic osteomyelitis of left foot with draining sinus ([address]     Receiving intravenous antibiotic treatment as outpatient      PLAN:    Pt appears to be stable at this point. Will re-check hgb in am. If it drops dramatically may need repeat EGD. Will keep her on PPI for now. Agree with podiatry consult to rule out persistent osteo. Pt may also have infected port as well.          Mariano Barahona MD

## 2019-07-14 NOTE — H&P
HISTORY AND PHYSICAL    2019     Patient Information:    Patient: Neeru Briggs     Gender: female  : 1970   Age: 46 y.o. MRN: [id number]        PCP:  Praful Garcia MD (Tel: [phone] )    Chief complaint:    Chief Complaint   Patient presents with    Abdominal Pain        History of Present Illness:  Neeru Briggs is a 46 y.o. female with h/o Upper GI bleed few years ago , s/p gastric sleeve on 2019 with normal EGD on 2019 by DR Juliann Morrow for dropping H/H who presented to ER for nausea and vomiting started today and she has episodes of hematemesis witnessed in ER  . N/V started today but pt has abdominal pain at RUQ and diarrhea for almost a week , diarrhea subsided 3 days ago . Pt also stated that she has fever 102 for last 2 days and in ER Tm 101.2  , and she was recently admitted for bacteremia and left foot infection with  Chronic osteomylitis of left first metatarsal   Left first metatarsal abscess and was d/c on Cefazolin  iv TID  By chest port . and supposed to be on for ABX for up to three month , but she followed her ID consultant last week and stated that the plan to switch to PO ABX in near future . In ER H/h dropped from 10.2/34.4 on  to 8.7/29.8 today . And abdomen and pelvic CT done with no acute finding   History obtained from patient . REVIEW OF SYSTEMS:   Constitutional: Negative for fever,chills or night sweats  ENT: Negative for rhinorrhea, epistaxis, hoarseness, sore throat. Respiratory: Negative for shortness of breath,wheezing  Cardiovascular: Negative for chest pain, palpitations   Gastrointestinal: + abd pain , nausea, vomiting, diarrhea  Genitourinary: Negative for polyuria, dysuria   Hematologic/Lymphatic: Negative for bleeding tendency, easy bruising  Musculoskeletal: pain at left foot   Neurologic: Negative for confusion,dysarthria.   Skin: Negative for itching,rash  Psychiatric: GFRAA >60 07/14/2019    LABGLOM >60 07/14/2019    GLUCOSE 104 07/14/2019           Patient Active Problem List   Diagnosis Code    Rectal bleeding K62.5    Fever R50.9    Upper GI bleed K92.2    Fibromyalgia M79.7    Lupus (systemic lupus erythematosus) ([facility]) M32.9    Nausea & vomiting R11.2    Chest pain R07.9    Chronic pancreatitis ([facility]) K86.1    HTN (hypertension) I10    Acute pancreatitis K85.90    Right-sided chest pain R07.9    Super obesity E66.9    Anemia D64.9    Hypokalemia E87.6    Generalized abdominal pain R10.84    Pneumonia of both lungs due to infectious organism J18.9    Foot ulcer, left ([facility]) L97.529    SLE (systemic lupus erythematosus related syndrome) ([facility]) M32.9    Hypoxia R09.02    Cellulitis L03.90    Pancytopenia ([facility]) D61.818    Pleurisy R09.1    Frequent UTI N39.0    Gastroesophageal reflux disease without esophagitis K21.9    MRSA cellulitis of left foot L03.116, B95.62    Depression F32.9    Fatty liver K76.0    Fatty liver disease, nonalcoholic C99.1    Arthritis M19.90    Bilateral low back pain with left-sided sciatica M54.42    Morbid obesity with BMI of 50.0-59.9, adult ([facility]) E66.01, Z68.43    Intractable vomiting with nausea R11.2    Class 3 obesity in adult [id number]    Hiatal hernia K44.9    Poor intravenous access Z78.9    Post-operative nausea and vomiting R11.2, Z98.890    Status post bariatric surgery Z98.84    Status post laparoscopic sleeve gastrectomy Z98.84    Abscess L02.91    Dysphagia R13.10    Pseudoseizure F44.5    Chronic pain syndrome G89.4    Drug-seeking/Aberrant behavior Z76.5    Acute conjunctivitis H10.30    Chronic osteomyelitis of left foot with draining sinus ([facility]) M86.472    Receiving intravenous antibiotic treatment as outpatient Z79.2    History of bacteremia Z87.898    RUQ pain R10.11         Active Hospital Problems    Diagnosis    Fever [R50.9]     Priority: High    RUQ pain [R10.11]     Priority:

## 2019-07-15 ENCOUNTER — APPOINTMENT (OUTPATIENT)
Dept: ULTRASOUND IMAGING | Age: 49
DRG: 270 | End: 2019-07-15
Payer: COMMERCIAL

## 2019-07-15 LAB
ANION GAP SERPL CALCULATED.3IONS-SCNC: 8 MMOL/L (ref 4–16)
APTT: 35.3 SECONDS (ref 21.2–33)
BUN BLDV-MCNC: 8 MG/DL (ref 6–23)
CALCIUM IONIZED: 5.24 MG/DL (ref 4.48–5.28)
CALCIUM SERPL-MCNC: 9.4 MG/DL (ref 8.3–10.6)
CHLORIDE BLD-SCNC: 101 MMOL/L (ref 99–110)
CO2: 25 MMOL/L (ref 21–32)
CREAT SERPL-MCNC: 0.8 MG/DL (ref 0.6–1.1)
CULTURE: NORMAL
GFR AFRICAN AMERICAN: >60 ML/MIN/1.73M2
GFR NON-AFRICAN AMERICAN: >60 ML/MIN/1.73M2
GLUCOSE BLD-MCNC: 147 MG/DL (ref 70–99)
HCT VFR BLD CALC: 27.7 % (ref 37–47)
HEMOGLOBIN: 8.3 GM/DL (ref 12.5–16)
HEMOGLOBIN: 8.4 GM/DL (ref 12.5–16)
HEMOGLOBIN: 8.4 GM/DL (ref 12.5–16)
INR BLD: 1.14 INDEX
IONIZED CA: 1.31 MMOL/L (ref 1.12–1.32)
Lab: NORMAL
MCH RBC QN AUTO: 24.5 PG (ref 27–31)
MCHC RBC AUTO-ENTMCNC: 30.3 % (ref 32–36)
MCV RBC AUTO: 80.8 FL (ref 78–100)
PDW BLD-RTO: 15 % (ref 11.7–14.9)
PLATELET # BLD: 100 K/CU MM (ref 140–440)
PMV BLD AUTO: 11.5 FL (ref 7.5–11.1)
POTASSIUM SERPL-SCNC: 4.3 MMOL/L (ref 3.5–5.1)
PROTHROMBIN TIME: 13.2 SECONDS (ref 9.12–12.5)
RBC # BLD: 3.43 M/CU MM (ref 4.2–5.4)
SODIUM BLD-SCNC: 134 MMOL/L (ref 135–145)
SPECIMEN: NORMAL
WBC # BLD: 2.7 K/CU MM (ref 4–10.5)

## 2019-07-15 PROCEDURE — 2580000003 HC RX 258: Performed by: FAMILY MEDICINE

## 2019-07-15 PROCEDURE — C9113 INJ PANTOPRAZOLE SODIUM, VIA: HCPCS | Performed by: SPECIALIST

## 2019-07-15 PROCEDURE — 93971 EXTREMITY STUDY: CPT

## 2019-07-15 PROCEDURE — 85027 COMPLETE CBC AUTOMATED: CPT

## 2019-07-15 PROCEDURE — 6370000000 HC RX 637 (ALT 250 FOR IP): Performed by: FAMILY MEDICINE

## 2019-07-15 PROCEDURE — 6360000002 HC RX W HCPCS: Performed by: SPECIALIST

## 2019-07-15 PROCEDURE — 2140000000 HC CCU INTERMEDIATE R&B

## 2019-07-15 PROCEDURE — 85730 THROMBOPLASTIN TIME PARTIAL: CPT

## 2019-07-15 PROCEDURE — 85018 HEMOGLOBIN: CPT

## 2019-07-15 PROCEDURE — 6360000002 HC RX W HCPCS: Performed by: FAMILY MEDICINE

## 2019-07-15 PROCEDURE — 82330 ASSAY OF CALCIUM: CPT

## 2019-07-15 PROCEDURE — 99222 1ST HOSP IP/OBS MODERATE 55: CPT | Performed by: INTERNAL MEDICINE

## 2019-07-15 PROCEDURE — 85610 PROTHROMBIN TIME: CPT

## 2019-07-15 PROCEDURE — 2500000003 HC RX 250 WO HCPCS: Performed by: FAMILY MEDICINE

## 2019-07-15 PROCEDURE — 80048 BASIC METABOLIC PNL TOTAL CA: CPT

## 2019-07-15 PROCEDURE — 99232 SBSQ HOSP IP/OBS MODERATE 35: CPT | Performed by: SURGERY

## 2019-07-15 RX ORDER — PROMETHAZINE HYDROCHLORIDE 25 MG/ML
12.5 INJECTION, SOLUTION INTRAMUSCULAR; INTRAVENOUS EVERY 4 HOURS PRN
Status: DISCONTINUED | OUTPATIENT
Start: 2019-07-15 | End: 2019-07-23 | Stop reason: HOSPADM

## 2019-07-15 RX ADMIN — CEFEPIME HYDROCHLORIDE 2 G: 2 INJECTION, POWDER, FOR SOLUTION INTRAVENOUS at 12:31

## 2019-07-15 RX ADMIN — PROMETHAZINE HYDROCHLORIDE 12.5 MG: 25 INJECTION, SOLUTION INTRAMUSCULAR; INTRAVENOUS at 18:31

## 2019-07-15 RX ADMIN — PANTOPRAZOLE SODIUM 40 MG: 40 INJECTION, POWDER, FOR SOLUTION INTRAVENOUS at 21:22

## 2019-07-15 RX ADMIN — ATENOLOL 25 MG: 25 TABLET ORAL at 12:32

## 2019-07-15 RX ADMIN — POTASSIUM CHLORIDE, DEXTROSE MONOHYDRATE AND SODIUM CHLORIDE: 150; 5; 450 INJECTION, SOLUTION INTRAVENOUS at 17:17

## 2019-07-15 RX ADMIN — SODIUM CHLORIDE, PRESERVATIVE FREE 10 ML: 5 INJECTION INTRAVENOUS at 21:23

## 2019-07-15 RX ADMIN — SODIUM CHLORIDE, PRESERVATIVE FREE 10 ML: 5 INJECTION INTRAVENOUS at 09:51

## 2019-07-15 RX ADMIN — HYDROMORPHONE HYDROCHLORIDE 0.5 MG: 2 INJECTION, SOLUTION INTRAMUSCULAR; INTRAVENOUS; SUBCUTANEOUS at 09:51

## 2019-07-15 RX ADMIN — LEVOTHYROXINE SODIUM 125 MCG: 125 TABLET ORAL at 12:32

## 2019-07-15 RX ADMIN — OYSTER SHELL CALCIUM WITH VITAMIN D 1 TABLET: 500; 200 TABLET, FILM COATED ORAL at 21:22

## 2019-07-15 RX ADMIN — CLONIDINE HYDROCHLORIDE 0.1 MG: 0.1 TABLET ORAL at 12:31

## 2019-07-15 RX ADMIN — PROMETHAZINE HYDROCHLORIDE 12.5 MG: 25 INJECTION, SOLUTION INTRAMUSCULAR; INTRAVENOUS at 13:58

## 2019-07-15 RX ADMIN — ESTRADIOL 0.5 MG: 1 TABLET ORAL at 12:32

## 2019-07-15 RX ADMIN — POTASSIUM CHLORIDE, DEXTROSE MONOHYDRATE AND SODIUM CHLORIDE: 150; 5; 450 INJECTION, SOLUTION INTRAVENOUS at 09:51

## 2019-07-15 RX ADMIN — DICYCLOMINE HYDROCHLORIDE 20 MG: 20 TABLET ORAL at 21:22

## 2019-07-15 RX ADMIN — CEFEPIME HYDROCHLORIDE 2 G: 2 INJECTION, POWDER, FOR SOLUTION INTRAVENOUS at 21:22

## 2019-07-15 RX ADMIN — HYDROMORPHONE HYDROCHLORIDE 0.5 MG: 2 INJECTION, SOLUTION INTRAMUSCULAR; INTRAVENOUS; SUBCUTANEOUS at 13:58

## 2019-07-15 RX ADMIN — CEFEPIME HYDROCHLORIDE 2 G: 2 INJECTION, POWDER, FOR SOLUTION INTRAVENOUS at 03:05

## 2019-07-15 RX ADMIN — HYDROMORPHONE HYDROCHLORIDE 0.5 MG: 2 INJECTION, SOLUTION INTRAMUSCULAR; INTRAVENOUS; SUBCUTANEOUS at 23:56

## 2019-07-15 RX ADMIN — DICYCLOMINE HYDROCHLORIDE 20 MG: 20 TABLET ORAL at 03:13

## 2019-07-15 RX ADMIN — VANCOMYCIN HYDROCHLORIDE 1750 MG: 5 INJECTION, POWDER, LYOPHILIZED, FOR SOLUTION INTRAVENOUS at 10:01

## 2019-07-15 RX ADMIN — Medication 10 ML: at 23:57

## 2019-07-15 RX ADMIN — ONDANSETRON 4 MG: 2 INJECTION INTRAMUSCULAR; INTRAVENOUS at 09:51

## 2019-07-15 RX ADMIN — GABAPENTIN 800 MG: 400 CAPSULE ORAL at 21:22

## 2019-07-15 RX ADMIN — PANTOPRAZOLE SODIUM 40 MG: 40 INJECTION, POWDER, FOR SOLUTION INTRAVENOUS at 09:51

## 2019-07-15 RX ADMIN — HYDROCODONE BITARTRATE AND ACETAMINOPHEN 1 TABLET: 7.5; 325 TABLET ORAL at 03:13

## 2019-07-15 RX ADMIN — FERROUS SULFATE TAB 325 MG (65 MG ELEMENTAL FE) 325 MG: 325 (65 FE) TAB at 21:22

## 2019-07-15 RX ADMIN — HYDROMORPHONE HYDROCHLORIDE 0.5 MG: 2 INJECTION, SOLUTION INTRAMUSCULAR; INTRAVENOUS; SUBCUTANEOUS at 18:31

## 2019-07-15 RX ADMIN — GABAPENTIN 800 MG: 400 CAPSULE ORAL at 12:31

## 2019-07-15 ASSESSMENT — PAIN SCALES - WONG BAKER
WONGBAKER_NUMERICALRESPONSE: 0

## 2019-07-15 ASSESSMENT — PAIN DESCRIPTION - LOCATION
LOCATION: ABDOMEN;HEAD
LOCATION: ABDOMEN;FOOT
LOCATION: ABDOMEN;HEAD

## 2019-07-15 ASSESSMENT — PAIN DESCRIPTION - PAIN TYPE
TYPE: ACUTE PAIN

## 2019-07-15 ASSESSMENT — PAIN DESCRIPTION - PROGRESSION

## 2019-07-15 ASSESSMENT — PAIN SCALES - GENERAL
PAINLEVEL_OUTOF10: 6
PAINLEVEL_OUTOF10: 8
PAINLEVEL_OUTOF10: 8
PAINLEVEL_OUTOF10: 7
PAINLEVEL_OUTOF10: 9
PAINLEVEL_OUTOF10: 8

## 2019-07-15 ASSESSMENT — PAIN DESCRIPTION - DESCRIPTORS
DESCRIPTORS: ACHING
DESCRIPTORS: ACHING;CONSTANT;DISCOMFORT

## 2019-07-15 ASSESSMENT — PAIN DESCRIPTION - ORIENTATION
ORIENTATION: LEFT
ORIENTATION: MID;RIGHT

## 2019-07-15 ASSESSMENT — PAIN DESCRIPTION - ONSET
ONSET: ON-GOING
ONSET: ON-GOING

## 2019-07-15 ASSESSMENT — PAIN DESCRIPTION - FREQUENCY
FREQUENCY: CONTINUOUS
FREQUENCY: INTERMITTENT

## 2019-07-15 NOTE — CONSULTS
History:   Diagnosis Date    Acid reflux     Anemia     Anxiety     Arthritis     Hands, Back And Ankles    Bleeding ulcer     \"I Had Ulcers In My Stomach And Colon\"    Bronchitis Last Episode 2014    Chronic back pain     Chronic [facility]     [facility]    COPD (chronic obstructive pulmonary disease) ([facility] [address]     Sees Dr. Raphael Perea Depression     Fibromyalgia Dx     H/O echocardiogram 8/11/15    EF >55%. LA to be at the upper limit of normal in size. LV hypertrophy with normal LV systolic, but abnormal diastolic function. Normal valvular structures and function.  H/O echocardiogram 2018    EF 55-60%    Hiatal hernia     History of blood transfusion 2015 And     No Reaction To Blood Transfusions Received    [place] (hard of hearing)     Right Ear    Hx of cardiac catheterization 10/24/2010    Angiographically normal coronary arteries w/ normal LV function and wall motion.  Hx of transesophageal echocardiography (PILO) for monitoring 2010    EF 55-60%. WNL.     Hypertension     Lupus ([address] Dx     \"Rheumatoid Lupus\"    Morbid obesity ([address]     Nausea     \"Most Of The Time\"    Pain management     Sees Dr. Kamini Shultz, Once A Month    Pancreatitis chronic Dx     Pneumonia Dx 11-15    Shortness of breath on exertion     Shortness of breath on exertion     Sleep apnea     Has CPAP    Staph infection Dx -15    Left Foot    Staph infection Dx 11-15    \"Left Foot\"    Teeth missing     Upper And Lower    Thyroid disease     UTI (urinary tract infection) In Past    No Current Symptoms    Wears glasses     To Read      Past Surgical History:   Procedure Laterality Date    APPENDECTOMY  1998    Done When Tubes And Ovaries Were Removed    CARDIAC CATHETERIZATION  10/24/2010     SECTION  1991    CHOLECYSTECTOMY, LAPAROSCOPIC      COLONOSCOPY  Last Done In 's    DENTAL SURGERY      Teeth Extracted In Past    Use    Smoking status: Never Smoker    Smokeless tobacco: Never Used   Substance Use Topics    Alcohol use: No     Alcohol/week: 0.0 standard drinks       Born:   Lived   Occupation:   No recent travel of significance.  No recent unusual exposures.  NO pets    ? ALLERGIES  Allergies   Allergen Reactions    Aspirin Palpitations     \"My Heart Rate Elevates\"    Compazine [Prochlorperazine Maleate] Rash    Reglan [Metoclopramide Hcl] Rash    Shellfish-Derived Products Swelling    Toradol [Ketorolac Tromethamine] Rash      MEDICATIONS  Reviewed and are per the chart/EMR. ? Antibiotics:   Vancomycin  Cefepime  ?  -------------------------------------------------------------------------------------------------------------------    Vital Signs:  Vitals:    07/15/19 1459   BP:    Pulse: 67   Resp: 22   Temp:    SpO2:          Exam:    VS: noted; wt 118.5 kg  Gen: alert and oriented X3, no distress  Skin: no stigmata of endocarditis  Wounds: C/D/I  HEMT: AT/NC Oropharynx pink, moist, and without lesions or exudates; dentition in good state of repair  Eyes: PERRLA, EOMI, conjunctiva pink, sclera anicteric. Neck: Supple. Trachea midline. No LAD. Chest: no distress and CTA. Good air movement. Heart: RRR and no MRG. Abd: soft, non-distended, right upper quadrant tenderness to deep palpation, no hepatomegaly. Normoactive bowel sounds. Ext: no clubbing, cyanosis, or edema  Catheter Site: Left anterior chest wall MediPort is accessed, slight erythema around it. Tender to the touch. Neuro: Mental status intact. CN 2-12 intact and no focal sensory or motor deficits    ? Diagnostic Studies: reviewed  ? ? I have examined this patient and available medical records on this date and have made the above observations, conclusions and recommendations.   Electronically signed by: Electronically signed by Gemini Freire MD on 7/15/2019 at 3:09 PM

## 2019-07-15 NOTE — PROGRESS NOTES
GENERAL SURGERY PROGRESS NOTE    CC/HPI:           Patient feels better, no further hematemesis. .. Vitals:    07/14/19 1704 07/14/19 2057 07/14/19 2100 07/15/19 0309   BP:  137/62 137/62 (!) 101/49   Pulse:   86 98   Resp:   18 22   Temp: 100.6 °F (38.1 °C)  99.1 °F (37.3 °C) 100.7 °F (38.2 °C)   TempSrc: Oral  Oral Oral   SpO2:   97% 93%   Weight:       Height:         No intake/output data recorded. No intake/output data recorded.     Diet NPO Effective Now Exceptions are: Ice Chips, Sips with Meds    Recent Results (from the past 48 hour(s))   CBC Auto Differential    Collection Time: 07/14/19  6:50 AM   Result Value Ref Range    WBC 2.8 (L) 4.0 - 10.5 K/CU MM    RBC 3.61 (L) 4.2 - 5.4 M/CU MM    Hemoglobin 8.7 (L) 12.5 - 16.0 GM/DL    Hematocrit 29.5 (L) 37 - 47 %    MCV 81.7 78 - 100 FL    MCH 24.1 (L) 27 - 31 PG    MCHC 29.5 (L) 32.0 - 36.0 %    RDW 14.8 11.7 - 14.9 %    Platelets 268  RESULTS CONFIRMED BY SMEAR REVIEW   140 - 440 K/CU MM    MPV 10.2 7.5 - 11.1 FL    Immature Neutrophil % 0.0 0 - 0.43 %    Segs Relative 51.1 36 - 66 %    Eosinophils % 5.4 (H) 0 - 3 %    Basophils % 0.7 0 - 1 %    Lymphocytes % 36.3 24 - 44 %    Monocytes % 6.5 (H) 0 - 4 %    Total Immature Neutrophil 0.00 K/CU MM    Segs Absolute 1.4 K/CU MM    Eosinophils # 0.2 K/CU MM    Basophils # 0.0 K/CU MM    Lymphocytes # 1.0 K/CU MM    Monocytes # 0.2 K/CU MM    Differential Type AUTOMATED DIFFERENTIAL    CMP    Collection Time: 07/14/19  6:50 AM   Result Value Ref Range    Sodium 140 135 - 145 MMOL/L    Potassium 4.1 3.5 - 5.1 MMOL/L    Chloride 105 99 - 110 mMol/L    CO2 26 21 - 32 MMOL/L    BUN 8 6 - 23 MG/DL    CREATININE 0.7 0.6 - 1.1 MG/DL    Glucose 104 (H) 70 - 99 MG/DL    Calcium 10.3 8.3 - 10.6 MG/DL    Alb 3.7 3.4 - 5.0 GM/DL    Total Protein 6.5 6.4 - 8.2 GM/DL    Total Bilirubin 0.3 0.0 - 1.0 MG/DL    ALT 8 (L) 10 - 40 U/L    AST 23 15 - 37 IU/L    Alkaline Phosphatase 114 40 - 128 IU/L    GFR Non-

## 2019-07-15 NOTE — CONSULTS
and management and the patient  is on Protonix drip at present. As above mentioned, the patient has had multiple EGDs done in the past  including EGD by me on 09/21/2015 for anemia and epigastric pain, but  the exam was unremarkable. A repeat EGD on 10/01/2015 for anemia and GI  bleeding showed a large amount of food particles in the stomach and  duodenum, but no blood was seen. The patient also has had at least 4  EGDs done by Dr. Archie Rogers.  In fact, the first EGD on 08/27/2018 prior to  her sleeve gastrectomy was done showed mild antral gastritis; otherwise,  exam was unremarkable. A second EGD by Dr. Archie Rogers was done on  03/12/2019 for upper GI bleeding and the exam was essentially  unremarkable with no evidence of gastritis or peptic ulcer disease. Third EGD by Dr. Archie Rogers on 04/02/2019 showed evidence of bleeding from  the distal staple line in the stomach, which was injected with  epinephrine solution. Fourth EGD by Dr. Archie Rogers on 06/19/2019 was again  unremarkable with no evidence of upper GI bleeding. The patient also  has had at least 3 colonoscopies done and the last colonoscopy was done  by Dr. Mohsen Johnson. Archie Rogers approximately 4 years ago. The patient is  hemodynamically stable at present. REVIEW OF SYSTEMS:  CENTRAL NERVOUS SYSTEM:  The patient denies headache or focal  sensorimotor symptoms. CARDIOVASCULAR:  No history of chest pain, shortness of breath, or leg  swelling. GENITOURINARY:  No history of dysuria, pyuria, or hematuria. MUSCULOSKELETAL:  The patient complains of generalized weakness. RESPIRATORY:  No history of cough, hemoptysis, fever, or chills.     PAST MEDICAL HISTORY:  Significant for history of morbid obesity, lupus,  hypertension, depression, anxiety disorder, gastroesophageal reflux  disease, peptic ulcer disease, hypothyroidism, history of gallstones,  status post cholecystectomy complicated by common bile duct for the  patient has had 7 or 8 ERCPs done with

## 2019-07-16 ENCOUNTER — ANESTHESIA EVENT (OUTPATIENT)
Dept: OPERATING ROOM | Age: 49
DRG: 270 | End: 2019-07-16
Payer: COMMERCIAL

## 2019-07-16 ENCOUNTER — ANESTHESIA (OUTPATIENT)
Dept: OPERATING ROOM | Age: 49
DRG: 270 | End: 2019-07-16
Payer: COMMERCIAL

## 2019-07-16 VITALS — OXYGEN SATURATION: 100 % | SYSTOLIC BLOOD PRESSURE: 99 MMHG | DIASTOLIC BLOOD PRESSURE: 40 MMHG

## 2019-07-16 LAB
APTT: 33.9 SECONDS (ref 21.2–33)
CALCIUM IONIZED: 5.92 MG/DL (ref 4.48–5.28)
HCT VFR BLD CALC: 25.5 % (ref 37–47)
HCT VFR BLD CALC: 26.3 % (ref 37–47)
HCT VFR BLD CALC: 27.3 % (ref 37–47)
HEMOGLOBIN: 7.7 GM/DL (ref 12.5–16)
HEMOGLOBIN: 7.8 GM/DL (ref 12.5–16)
HEMOGLOBIN: 8.1 GM/DL (ref 12.5–16)
INR BLD: 1.04 INDEX
IONIZED CA: 1.48 MMOL/L (ref 1.12–1.32)
PROTHROMBIN TIME: 11.9 SECONDS (ref 9.12–12.5)

## 2019-07-16 PROCEDURE — 6360000002 HC RX W HCPCS: Performed by: NURSE ANESTHETIST, CERTIFIED REGISTERED

## 2019-07-16 PROCEDURE — 2140000000 HC CCU INTERMEDIATE R&B

## 2019-07-16 PROCEDURE — 36569 INSJ PICC 5 YR+ W/O IMAGING: CPT

## 2019-07-16 PROCEDURE — 6370000000 HC RX 637 (ALT 250 FOR IP): Performed by: SURGERY

## 2019-07-16 PROCEDURE — 6360000002 HC RX W HCPCS: Performed by: SURGERY

## 2019-07-16 PROCEDURE — 02PY03Z REMOVAL OF INFUSION DEVICE FROM GREAT VESSEL, OPEN APPROACH: ICD-10-PCS | Performed by: SURGERY

## 2019-07-16 PROCEDURE — 94761 N-INVAS EAR/PLS OXIMETRY MLT: CPT

## 2019-07-16 PROCEDURE — 3600000012 HC SURGERY LEVEL 2 ADDTL 15MIN: Performed by: SURGERY

## 2019-07-16 PROCEDURE — 2580000003 HC RX 258

## 2019-07-16 PROCEDURE — 87073 CULTURE BACTERIA ANAEROBIC: CPT

## 2019-07-16 PROCEDURE — 3600000002 HC SURGERY LEVEL 2 BASE: Performed by: SURGERY

## 2019-07-16 PROCEDURE — C1751 CATH, INF, PER/CENT/MIDLINE: HCPCS

## 2019-07-16 PROCEDURE — 87077 CULTURE AEROBIC IDENTIFY: CPT

## 2019-07-16 PROCEDURE — 2500000003 HC RX 250 WO HCPCS: Performed by: SURGERY

## 2019-07-16 PROCEDURE — 2580000003 HC RX 258: Performed by: ANESTHESIOLOGY

## 2019-07-16 PROCEDURE — 82330 ASSAY OF CALCIUM: CPT

## 2019-07-16 PROCEDURE — 3700000001 HC ADD 15 MINUTES (ANESTHESIA): Performed by: SURGERY

## 2019-07-16 PROCEDURE — 85610 PROTHROMBIN TIME: CPT

## 2019-07-16 PROCEDURE — C9113 INJ PANTOPRAZOLE SODIUM, VIA: HCPCS | Performed by: SPECIALIST

## 2019-07-16 PROCEDURE — 87186 SC STD MICRODIL/AGAR DIL: CPT

## 2019-07-16 PROCEDURE — 2580000003 HC RX 258: Performed by: FAMILY MEDICINE

## 2019-07-16 PROCEDURE — 6360000002 HC RX W HCPCS: Performed by: SPECIALIST

## 2019-07-16 PROCEDURE — 2709999900 HC NON-CHARGEABLE SUPPLY: Performed by: SURGERY

## 2019-07-16 PROCEDURE — 6360000002 HC RX W HCPCS: Performed by: FAMILY MEDICINE

## 2019-07-16 PROCEDURE — 87205 SMEAR GRAM STAIN: CPT

## 2019-07-16 PROCEDURE — 2580000003 HC RX 258: Performed by: SURGERY

## 2019-07-16 PROCEDURE — 3700000000 HC ANESTHESIA ATTENDED CARE: Performed by: SURGERY

## 2019-07-16 PROCEDURE — 85730 THROMBOPLASTIN TIME PARTIAL: CPT

## 2019-07-16 PROCEDURE — C9113 INJ PANTOPRAZOLE SODIUM, VIA: HCPCS | Performed by: SURGERY

## 2019-07-16 PROCEDURE — 6370000000 HC RX 637 (ALT 250 FOR IP): Performed by: FAMILY MEDICINE

## 2019-07-16 PROCEDURE — 2500000003 HC RX 250 WO HCPCS: Performed by: FAMILY MEDICINE

## 2019-07-16 PROCEDURE — 85018 HEMOGLOBIN: CPT

## 2019-07-16 PROCEDURE — 36589 REMOVAL TUNNELED CV CATH: CPT | Performed by: SURGERY

## 2019-07-16 PROCEDURE — 99232 SBSQ HOSP IP/OBS MODERATE 35: CPT | Performed by: SURGERY

## 2019-07-16 PROCEDURE — 87071 CULTURE AEROBIC QUANT OTHER: CPT

## 2019-07-16 PROCEDURE — 85014 HEMATOCRIT: CPT

## 2019-07-16 PROCEDURE — 76937 US GUIDE VASCULAR ACCESS: CPT

## 2019-07-16 RX ORDER — PROPOFOL 10 MG/ML
INJECTION, EMULSION INTRAVENOUS PRN
Status: DISCONTINUED | OUTPATIENT
Start: 2019-07-16 | End: 2019-07-16 | Stop reason: SDUPTHER

## 2019-07-16 RX ORDER — SODIUM CHLORIDE, SODIUM LACTATE, POTASSIUM CHLORIDE, CALCIUM CHLORIDE 600; 310; 30; 20 MG/100ML; MG/100ML; MG/100ML; MG/100ML
INJECTION, SOLUTION INTRAVENOUS
Status: COMPLETED
Start: 2019-07-16 | End: 2019-07-16

## 2019-07-16 RX ORDER — LIDOCAINE HYDROCHLORIDE 10 MG/ML
5 INJECTION, SOLUTION EPIDURAL; INFILTRATION; INTRACAUDAL; PERINEURAL ONCE
Status: COMPLETED | OUTPATIENT
Start: 2019-07-16 | End: 2019-07-16

## 2019-07-16 RX ORDER — MIDAZOLAM HYDROCHLORIDE 1 MG/ML
INJECTION INTRAMUSCULAR; INTRAVENOUS PRN
Status: DISCONTINUED | OUTPATIENT
Start: 2019-07-16 | End: 2019-07-16 | Stop reason: SDUPTHER

## 2019-07-16 RX ORDER — HYDROMORPHONE HCL 110MG/55ML
0.5 PATIENT CONTROLLED ANALGESIA SYRINGE INTRAVENOUS
Status: DISCONTINUED | OUTPATIENT
Start: 2019-07-16 | End: 2019-07-21

## 2019-07-16 RX ORDER — SODIUM CHLORIDE 0.9 % (FLUSH) 0.9 %
10 SYRINGE (ML) INJECTION EVERY 12 HOURS SCHEDULED
Status: DISCONTINUED | OUTPATIENT
Start: 2019-07-16 | End: 2019-07-23 | Stop reason: HOSPADM

## 2019-07-16 RX ORDER — HEPARIN SODIUM (PORCINE) LOCK FLUSH IV SOLN 100 UNIT/ML 100 UNIT/ML
100 SOLUTION INTRAVENOUS PRN
Status: DISCONTINUED | OUTPATIENT
Start: 2019-07-16 | End: 2019-07-23 | Stop reason: HOSPADM

## 2019-07-16 RX ORDER — SODIUM CHLORIDE 0.9 % (FLUSH) 0.9 %
10 SYRINGE (ML) INJECTION PRN
Status: DISCONTINUED | OUTPATIENT
Start: 2019-07-16 | End: 2019-07-23 | Stop reason: HOSPADM

## 2019-07-16 RX ORDER — SODIUM CHLORIDE, SODIUM LACTATE, POTASSIUM CHLORIDE, CALCIUM CHLORIDE 600; 310; 30; 20 MG/100ML; MG/100ML; MG/100ML; MG/100ML
INJECTION, SOLUTION INTRAVENOUS CONTINUOUS
Status: DISCONTINUED | OUTPATIENT
Start: 2019-07-16 | End: 2019-07-23 | Stop reason: HOSPADM

## 2019-07-16 RX ORDER — LIDOCAINE HYDROCHLORIDE 20 MG/ML
INJECTION, SOLUTION INTRAVENOUS PRN
Status: DISCONTINUED | OUTPATIENT
Start: 2019-07-16 | End: 2019-07-16 | Stop reason: SDUPTHER

## 2019-07-16 RX ORDER — FENTANYL CITRATE 50 UG/ML
INJECTION, SOLUTION INTRAMUSCULAR; INTRAVENOUS PRN
Status: DISCONTINUED | OUTPATIENT
Start: 2019-07-16 | End: 2019-07-16 | Stop reason: SDUPTHER

## 2019-07-16 RX ADMIN — HYDROMORPHONE HYDROCHLORIDE 0.5 MG: 2 INJECTION, SOLUTION INTRAMUSCULAR; INTRAVENOUS; SUBCUTANEOUS at 17:20

## 2019-07-16 RX ADMIN — LIDOCAINE HYDROCHLORIDE 5 ML: 10 INJECTION, SOLUTION EPIDURAL; INFILTRATION; INTRACAUDAL; PERINEURAL at 11:49

## 2019-07-16 RX ADMIN — MIDAZOLAM HYDROCHLORIDE 2 MG: 1 INJECTION, SOLUTION INTRAMUSCULAR; INTRAVENOUS at 15:04

## 2019-07-16 RX ADMIN — LIDOCAINE HYDROCHLORIDE 100 MG: 20 INJECTION, SOLUTION INTRAVENOUS at 15:11

## 2019-07-16 RX ADMIN — Medication 100 UNITS: at 13:00

## 2019-07-16 RX ADMIN — SODIUM CHLORIDE, PRESERVATIVE FREE 10 ML: 5 INJECTION INTRAVENOUS at 22:01

## 2019-07-16 RX ADMIN — PROPOFOL 600 MG: 10 INJECTION, EMULSION INTRAVENOUS at 15:11

## 2019-07-16 RX ADMIN — CEFEPIME HYDROCHLORIDE 2 G: 2 INJECTION, POWDER, FOR SOLUTION INTRAVENOUS at 04:48

## 2019-07-16 RX ADMIN — OYSTER SHELL CALCIUM WITH VITAMIN D 1 TABLET: 500; 200 TABLET, FILM COATED ORAL at 22:05

## 2019-07-16 RX ADMIN — SODIUM CHLORIDE, PRESERVATIVE FREE 10 ML: 5 INJECTION INTRAVENOUS at 12:19

## 2019-07-16 RX ADMIN — CEFEPIME HYDROCHLORIDE 2 G: 2 INJECTION, POWDER, FOR SOLUTION INTRAVENOUS at 12:09

## 2019-07-16 RX ADMIN — PANTOPRAZOLE SODIUM 40 MG: 40 INJECTION, POWDER, FOR SOLUTION INTRAVENOUS at 08:38

## 2019-07-16 RX ADMIN — CEFEPIME HYDROCHLORIDE 2 G: 2 INJECTION, POWDER, FOR SOLUTION INTRAVENOUS at 18:58

## 2019-07-16 RX ADMIN — ONDANSETRON 4 MG: 2 INJECTION INTRAMUSCULAR; INTRAVENOUS at 08:37

## 2019-07-16 RX ADMIN — POTASSIUM CHLORIDE, DEXTROSE MONOHYDRATE AND SODIUM CHLORIDE: 150; 5; 450 INJECTION, SOLUTION INTRAVENOUS at 18:58

## 2019-07-16 RX ADMIN — DICYCLOMINE HYDROCHLORIDE 20 MG: 20 TABLET ORAL at 21:59

## 2019-07-16 RX ADMIN — FENTANYL CITRATE 100 MCG: 50 INJECTION INTRAMUSCULAR; INTRAVENOUS at 15:08

## 2019-07-16 RX ADMIN — PANTOPRAZOLE SODIUM 40 MG: 40 INJECTION, POWDER, FOR SOLUTION INTRAVENOUS at 22:01

## 2019-07-16 RX ADMIN — GABAPENTIN 800 MG: 400 CAPSULE ORAL at 22:00

## 2019-07-16 RX ADMIN — FERROUS SULFATE TAB 325 MG (65 MG ELEMENTAL FE) 325 MG: 325 (65 FE) TAB at 21:59

## 2019-07-16 RX ADMIN — CLONIDINE HYDROCHLORIDE 0.1 MG: 0.1 TABLET ORAL at 22:05

## 2019-07-16 RX ADMIN — LEVOTHYROXINE SODIUM 125 MCG: 125 TABLET ORAL at 07:00

## 2019-07-16 RX ADMIN — POTASSIUM CHLORIDE, DEXTROSE MONOHYDRATE AND SODIUM CHLORIDE: 150; 5; 450 INJECTION, SOLUTION INTRAVENOUS at 08:37

## 2019-07-16 RX ADMIN — POTASSIUM CHLORIDE, DEXTROSE MONOHYDRATE AND SODIUM CHLORIDE: 150; 5; 450 INJECTION, SOLUTION INTRAVENOUS at 00:39

## 2019-07-16 RX ADMIN — DICYCLOMINE HYDROCHLORIDE 20 MG: 20 TABLET ORAL at 04:49

## 2019-07-16 RX ADMIN — HYDROMORPHONE HYDROCHLORIDE 0.5 MG: 2 INJECTION, SOLUTION INTRAMUSCULAR; INTRAVENOUS; SUBCUTANEOUS at 08:38

## 2019-07-16 RX ADMIN — SODIUM CHLORIDE, POTASSIUM CHLORIDE, SODIUM LACTATE AND CALCIUM CHLORIDE 1000 ML: 600; 310; 30; 20 INJECTION, SOLUTION INTRAVENOUS at 13:53

## 2019-07-16 RX ADMIN — PROMETHAZINE HYDROCHLORIDE 12.5 MG: 25 INJECTION, SOLUTION INTRAMUSCULAR; INTRAVENOUS at 12:41

## 2019-07-16 RX ADMIN — SODIUM CHLORIDE, POTASSIUM CHLORIDE, SODIUM LACTATE AND CALCIUM CHLORIDE: 600; 310; 30; 20 INJECTION, SOLUTION INTRAVENOUS at 15:05

## 2019-07-16 RX ADMIN — PROMETHAZINE HYDROCHLORIDE 12.5 MG: 25 INJECTION, SOLUTION INTRAMUSCULAR; INTRAVENOUS at 16:26

## 2019-07-16 RX ADMIN — PROMETHAZINE HYDROCHLORIDE 12.5 MG: 25 INJECTION, SOLUTION INTRAMUSCULAR; INTRAVENOUS at 04:48

## 2019-07-16 RX ADMIN — SODIUM CHLORIDE, PRESERVATIVE FREE 10 ML: 5 INJECTION INTRAVENOUS at 08:38

## 2019-07-16 ASSESSMENT — PULMONARY FUNCTION TESTS
PIF_VALUE: 1
PIF_VALUE: 1
PIF_VALUE: 0
PIF_VALUE: 1
PIF_VALUE: 1
PIF_VALUE: 0
PIF_VALUE: 1
PIF_VALUE: 0
PIF_VALUE: 1
PIF_VALUE: 0
PIF_VALUE: 1
PIF_VALUE: 0
PIF_VALUE: 2
PIF_VALUE: 0
PIF_VALUE: 1
PIF_VALUE: 0
PIF_VALUE: 1
PIF_VALUE: 0
PIF_VALUE: 1
PIF_VALUE: 0
PIF_VALUE: 0

## 2019-07-16 ASSESSMENT — PAIN DESCRIPTION - PAIN TYPE
TYPE: ACUTE PAIN
TYPE: ACUTE PAIN

## 2019-07-16 ASSESSMENT — PAIN SCALES - GENERAL
PAINLEVEL_OUTOF10: 7
PAINLEVEL_OUTOF10: 8
PAINLEVEL_OUTOF10: 9

## 2019-07-16 ASSESSMENT — PAIN DESCRIPTION - LOCATION
LOCATION: ABDOMEN
LOCATION: CHEST;ABDOMEN

## 2019-07-16 ASSESSMENT — PAIN DESCRIPTION - FREQUENCY: FREQUENCY: INTERMITTENT

## 2019-07-16 ASSESSMENT — PAIN DESCRIPTION - DESCRIPTORS: DESCRIPTORS: ACHING

## 2019-07-16 ASSESSMENT — PAIN DESCRIPTION - ORIENTATION: ORIENTATION: RIGHT

## 2019-07-16 ASSESSMENT — ENCOUNTER SYMPTOMS: SHORTNESS OF BREATH: 1

## 2019-07-16 NOTE — PROGRESS NOTES
Meds:   sodium chloride flush  10 mL Intravenous 2 times per day    sodium chloride  250 mL Intravenous Once    atenolol  25 mg Oral Daily    calcium-vitamin D  1 tablet Oral BID    cloNIDine  0.1 mg Oral BID    vitamin D  5,000 Units Oral Daily    dicyclomine  20 mg Oral Q6H    estradiol  0.5 mg Oral Daily    ferrous sulfate  325 mg Oral BID    gabapentin  800 mg Oral TID    levothyroxine  125 mcg Oral Daily    therapeutic multivitamin-minerals  1 tablet Oral Daily    sodium chloride flush  10 mL Intravenous 2 times per day    [START ON 7/17/2019] sodium chloride (PF)  10 mL Intravenous Daily    cefepime  2 g Intravenous Q8H    pantoprazole  40 mg Intravenous BID       Continuous Infusions:   dextrose 5% and 0.45% NaCl with KCl 20 mEq 150 mL/hr at 07/16/19 0837       Physical Exam:  HEENT: Anicteric sclerae, Oropharyngeal mucosae moist, pink and intact. Heart:  Normal S1 and S2, RRR  Lungs: Clear to auscultation bilaterally, No audible Wheezes or Rales. Extremities: No edema. Neuro: Alert and Oriented x 3, Non focal.  Abdomen: Soft, Benign, Non tender, Non distended, Positive bowel sounds. Active Problems:    Abdominal pain, right upper quadrant    Fever    Upper GI bleed    Lupus (systemic lupus erythematosus) (HCC)    Anemia    Gastroesophageal reflux disease without esophagitis    Status post bariatric surgery    History of bacteremia    Sepsis due to Pseudomonas species ([facility] [address]    Bacteremia due to Pseudomonas    Catheter-related bloodstream infection  Resolved Problems:    * No resolved hospital problems. *      Assessment and Plan:  Cuco Martinez is a 46 y.o. female with hematemesis? ? Her last EGD last month had NO ULCERS. No gastritis, will continue to monitor H&Hs, and follow closely, and may need repeat EGD IF hematemesis is witnessed, or drops her Hb less than 7 gm%.     Her Blood cultures from the port are positive for Ps\eudomonas, ordered a PICC it is in, and will remove the

## 2019-07-16 NOTE — ANESTHESIA PRE PROCEDURE
Department of Anesthesiology  Preprocedure Note       Name:  Neeru Briggs   Age:  46 y.o.  :  1970                                          MRN:  [id number]         Date:  2019      Surgeon: Alyx Hubbard):  Rosalinda Jarrell MD    Procedure: PORT REMOVAL (N/A )    Medications prior to admission:   Prior to Admission medications    Medication Sig Start Date End Date Taking? Authorizing Provider   omeprazole (PRILOSEC) 40 MG delayed release capsule Take 40 mg by mouth daily    Historical Provider, MD   Cholecalciferol (VITAMIN D3) 5000 units TABS Take 1 tablet by mouth daily    Historical Provider, MD   cefUROXime (CEFTIN) 500 MG tablet Take 1 tablet by mouth 2 times daily Start on 7/29/19 7/10/19 8/21/19  Hoa Beasley MD   ondansetron (ZOFRAN ODT) 4 MG disintegrating tablet Take 1 tablet by mouth every 8 hours as needed for Nausea or Vomiting 19   Cristian Castillo PA-C   ceFAZolin (ANCEF) 2-4 GM/100ML-% SOLN IVPB (premix) Infuse 100 mLs intravenously every 8 hours 19  Praful Garcia MD   ferrous sulfate (FE TABS) 325 (65 Fe) MG EC tablet Take 1 tablet by mouth 2 times daily 19   Rosalinda Jarrell MD   tiZANidine (ZANAFLEX) 4 MG tablet Take 4 mg by mouth 2 times daily    Historical Provider, MD   promethazine (PHENERGAN) 25 MG tablet Take 1 tablet by mouth every 6 hours as needed for Nausea 19   Rosalinda Jarrell MD   pantoprazole (PROTONIX) 20 MG tablet Take 2 tablets by mouth 2 times daily 19   Naun Mirza MD   HYDROcodone-acetaminophen (Shawna Leyland) 7.5-325 MG per tablet Take 1 tablet by mouth every 6 hours as needed for Pain.      Historical Provider, MD   estradiol (ESTRACE) 0.5 MG tablet Take 0.5 mg by mouth daily    Historical Provider, MD   dicyclomine (BENTYL) 20 MG tablet Take 20 mg by mouth every 6 hours    Historical Provider, MD   atenolol (TENORMIN) 25 MG tablet Take 25 mg by mouth daily    Historical Provider, MD   cloNIDine (CATAPRES) 0.1 MG tablet Take 0.1 mg by mouth 2 times daily    Historical Provider, MD   Multiple Vitamin (MVI, BARIATRIC ADVANTAGE MULTI-FORMULA, CHEW TAB) Take 1 tablet by mouth daily 2/22/19   Kaylee Carreon MD   Calcium Citrate-Vitamin D (CALCIUM + VIT D, BARIATRIC ADVANTAGE, CHEWABLE TABLET) Take 1 tablet by mouth 2 times daily 2/22/19   Kaylee Carreon MD   levothyroxine (SYNTHROID) 125 MCG tablet Take 1 tablet by mouth Daily  Patient taking differently: Take 125 mcg by mouth nightly  8/8/18   Elijah Lee MD   gabapentin (NEURONTIN) 800 MG tablet Take 800 mg by mouth 3 times daily    Historical Provider, MD   PARoxetine (PAXIL) 30 MG tablet Take 30 mg by mouth nightly     Historical Provider, MD       Current medications:    No current facility-administered medications for this visit. No current outpatient medications on file.      Facility-Administered Medications Ordered in Other Visits   Medication Dose Route Frequency Provider Last Rate Last Dose    sodium chloride flush 0.9 % injection 10 mL  10 mL Intravenous 2 times per day Kaylee Carreon MD   10 mL at 07/16/19 1219    sodium chloride flush 0.9 % injection 10 mL  10 mL Intravenous PRN Kaylee Carreon MD        heparin flush 100 UNIT/ML injection 100 Units  100 Units Intracatheter PRN Darline Cotto MD   100 Units at 07/16/19 1300    lactated ringers infusion   Intravenous Continuous Rogena Pop,  mL/hr at 07/16/19 1353 1,000 mL at 07/16/19 1353    promethazine (PHENERGAN) injection 12.5 mg  12.5 mg Intravenous Q4H PRN Cortez Hanley MD   12.5 mg at 07/16/19 1241    0.9 % sodium chloride bolus  250 mL Intravenous Once [employer], PA-C   Stopped at 07/14/19 1018    atenolol (TENORMIN) tablet 25 mg  25 mg Oral Daily Darline Cotto MD   25 mg at 07/15/19 1232    calcium-vitamin D (OSCAL-500) 500-200 MG-UNIT per tablet 1 tablet  1 tablet Oral BID Rodrigo Rosales MD   1 tablet at 07/15/19 2122    cloNIDine (CATAPRES) tablet 0.1 mg  0.1 mg history of anesthetic complications (does not complain of PONV when interviewed): Airway: Mallampati: II  TM distance: <3 FB   Neck ROM: full  Mouth opening: > = 3 FB Dental:          Pulmonary: breath sounds clear to auscultation  (+) pneumonia:  COPD:  shortness of breath: chronic,  sleep apnea: on CPAP,                             Cardiovascular:  Exercise tolerance: poor (<4 METS),   (+) hypertension: moderate, PARSONS:,         Rhythm: regular  Rate: normal           Beta Blocker:  Dose within 24 Hrs      ROS comment: EF >55%. LA to be at the upper limit of normal in size. LV hypertrophy with normal LV systolic, but abnormal diastolic function. Normal valvular structures and function. Neuro/Psych:   (+) neuromuscular disease:,             GI/Hepatic/Renal:   (+) hiatal hernia, GERD:, PUD, liver disease:, morbid obesity          Endo/Other:    (+) hypothyroidism: arthritis: OA., .                  ROS comment: Lupus Abdominal:   (+) obese,     Abdomen: soft. Vascular:                                          Anesthesia Plan      TIVA and MAC     ASA 3       Induction: intravenous. Anesthetic plan and risks discussed with patient. Use of blood products discussed with patient whom consented to blood products. Plan discussed with CRNA. MICHAEL Gregg CRNA   7/16/2019    Pre Anesthesia Evaluation complete. Anesthesia plan, risks, benefits, alternatives, and personnel discussed with patient and/or legal guardian. Patient and/or legal guardian verbalized an understanding and agreed to proceed. Anesthesia plan discussed with care team members and agreed upon.   MICHAEL Gregg CRNA  7/16/2019

## 2019-07-16 NOTE — CONSULTS
[address], [address] [facility]                                  CONSULTATION    PATIENT NAME: Loraine Rao                  :        1970  MED REC NO:   [id number]                          ROOM:  ACCOUNT NO:   [de-identified]                           ADMIT DATE: 2019  PROVIDER:     Tyra Severin, DPM    CONSULT DATE:  2019    HISTORY OF PRESENT ILLNESS:  The patient is a very pleasant 49-year-old  female who previously had a curettage and packing with Osteoset and  antibiotics to the first metatarsal on the left foot. The patient is  seen at bedside today and denies any current nausea, vomiting, fever, or  chills. The patient is having a GI workup and evaluation for the  MediPort for possible infection. When I went to see her, she was  getting a new PICC line placed. The patient is otherwise doing well. The patient's procedure was performed on 2019. PHYSICAL EXAMINATION:  The patient's podiatric physical examination  shows at this time that the first MPJ region appears to be well healing  with just one tiny little suture that is fitting that is Vicryl,  wet-to-dry. There is no purulent drainage associated to the site. There is no redness, no warmth, and no tenderness or swelling associated  to the region. The patient previously had warmth, redness, and  significant tenderness to the site as well as drainage. This has  significantly improved from preoperative evaluation. IMAGING:  X-ray evaluation was reviewed. I did evaluate it and there  has not been any significant changes in the x-ray. It would be as I  would expect it to look at this particular time. There is a stable  lucency associated at the base of the first metatarsal.    ASSESSMENT:  At this time is postop osteomyelitis curettage and packing  to the left first metatarsal, postop left foot surgery.     TREATMENT:  The patient at this time is inspected. The patient is  educated about her condition. At this time, I would think that it is  most likely not coming from her foot. It appears to me that the area  has significantly improved compared to preop. I do agree with the  Infectious Disease recommendations for IV antibiotics. Definitely, I  would like to see her get those for an additional four weeks, and I  believe that she can begin to ambulate and do weightbearing to tolerance  in a regular supportive type of shoe. I want her to make sure that she  makes a followup to see me in the office at [phone] within the next two  weeks and call if there are any problems, questions, or concerns. If  there are any changes seen in the foot, please contact me at  [phone]. I think the patient is doing good from a podiatric  standpoint at this time.         Jim Robb DPM    D: 07/16/2019 12:29:41       T: 07/16/2019 12:35:59     [email or web address]  Job#: [id number]     Doc#: [id number]    CC:

## 2019-07-17 LAB
APTT: 28.7 SECONDS (ref 21.2–33)
CALCIUM IONIZED: 5.08 MG/DL (ref 4.48–5.28)
CULTURE: ABNORMAL
CULTURE: ABNORMAL
HCT VFR BLD CALC: 22.5 % (ref 37–47)
HCT VFR BLD CALC: 25.4 % (ref 37–47)
HEMOGLOBIN: 7.5 GM/DL (ref 12.5–16)
HEMOGLOBIN: ABNORMAL GM/DL (ref 12.5–16)
INR BLD: 1.01 INDEX
IONIZED CA: 1.27 MMOL/L (ref 1.12–1.32)
Lab: ABNORMAL
PROTHROMBIN TIME: 11.5 SECONDS (ref 9.12–12.5)
REASON FOR REJECTION: NORMAL
REASON FOR REJECTION: NORMAL
REJECTED TEST: NORMAL
SPECIMEN: ABNORMAL

## 2019-07-17 PROCEDURE — 6360000002 HC RX W HCPCS: Performed by: SURGERY

## 2019-07-17 PROCEDURE — 2500000003 HC RX 250 WO HCPCS: Performed by: SURGERY

## 2019-07-17 PROCEDURE — 85014 HEMATOCRIT: CPT

## 2019-07-17 PROCEDURE — 2140000000 HC CCU INTERMEDIATE R&B

## 2019-07-17 PROCEDURE — 94761 N-INVAS EAR/PLS OXIMETRY MLT: CPT

## 2019-07-17 PROCEDURE — 85018 HEMOGLOBIN: CPT

## 2019-07-17 PROCEDURE — 6370000000 HC RX 637 (ALT 250 FOR IP): Performed by: SURGERY

## 2019-07-17 PROCEDURE — 2580000003 HC RX 258: Performed by: SURGERY

## 2019-07-17 PROCEDURE — 82330 ASSAY OF CALCIUM: CPT

## 2019-07-17 PROCEDURE — 99232 SBSQ HOSP IP/OBS MODERATE 35: CPT | Performed by: INTERNAL MEDICINE

## 2019-07-17 PROCEDURE — 99024 POSTOP FOLLOW-UP VISIT: CPT | Performed by: SURGERY

## 2019-07-17 PROCEDURE — C9113 INJ PANTOPRAZOLE SODIUM, VIA: HCPCS | Performed by: SURGERY

## 2019-07-17 PROCEDURE — 85610 PROTHROMBIN TIME: CPT

## 2019-07-17 PROCEDURE — 85730 THROMBOPLASTIN TIME PARTIAL: CPT

## 2019-07-17 RX ORDER — 0.9 % SODIUM CHLORIDE 0.9 %
250 INTRAVENOUS SOLUTION INTRAVENOUS ONCE
Status: DISCONTINUED | OUTPATIENT
Start: 2019-07-17 | End: 2019-07-23 | Stop reason: HOSPADM

## 2019-07-17 RX ADMIN — FERROUS SULFATE TAB 325 MG (65 MG ELEMENTAL FE) 325 MG: 325 (65 FE) TAB at 08:07

## 2019-07-17 RX ADMIN — HYDROMORPHONE HYDROCHLORIDE 0.5 MG: 2 INJECTION, SOLUTION INTRAMUSCULAR; INTRAVENOUS; SUBCUTANEOUS at 13:34

## 2019-07-17 RX ADMIN — GABAPENTIN 800 MG: 400 CAPSULE ORAL at 21:00

## 2019-07-17 RX ADMIN — LEVOTHYROXINE SODIUM 125 MCG: 125 TABLET ORAL at 08:06

## 2019-07-17 RX ADMIN — Medication 5000 UNITS: at 08:05

## 2019-07-17 RX ADMIN — DICYCLOMINE HYDROCHLORIDE 20 MG: 20 TABLET ORAL at 21:01

## 2019-07-17 RX ADMIN — HYDROMORPHONE HYDROCHLORIDE 0.5 MG: 2 INJECTION, SOLUTION INTRAMUSCULAR; INTRAVENOUS; SUBCUTANEOUS at 01:51

## 2019-07-17 RX ADMIN — SODIUM CHLORIDE, PRESERVATIVE FREE 10 ML: 5 INJECTION INTRAVENOUS at 21:02

## 2019-07-17 RX ADMIN — DICYCLOMINE HYDROCHLORIDE 20 MG: 20 TABLET ORAL at 18:11

## 2019-07-17 RX ADMIN — POTASSIUM CHLORIDE, DEXTROSE MONOHYDRATE AND SODIUM CHLORIDE: 150; 5; 450 INJECTION, SOLUTION INTRAVENOUS at 19:32

## 2019-07-17 RX ADMIN — ESTRADIOL 0.5 MG: 1 TABLET ORAL at 08:07

## 2019-07-17 RX ADMIN — FERROUS SULFATE TAB 325 MG (65 MG ELEMENTAL FE) 325 MG: 325 (65 FE) TAB at 21:00

## 2019-07-17 RX ADMIN — CEFEPIME HYDROCHLORIDE 2 G: 2 INJECTION, POWDER, FOR SOLUTION INTRAVENOUS at 03:31

## 2019-07-17 RX ADMIN — PROMETHAZINE HYDROCHLORIDE 12.5 MG: 25 INJECTION, SOLUTION INTRAMUSCULAR; INTRAVENOUS at 01:51

## 2019-07-17 RX ADMIN — OYSTER SHELL CALCIUM WITH VITAMIN D 1 TABLET: 500; 200 TABLET, FILM COATED ORAL at 08:06

## 2019-07-17 RX ADMIN — PROMETHAZINE HYDROCHLORIDE 12.5 MG: 25 INJECTION, SOLUTION INTRAMUSCULAR; INTRAVENOUS at 08:17

## 2019-07-17 RX ADMIN — PROMETHAZINE HYDROCHLORIDE 12.5 MG: 25 INJECTION, SOLUTION INTRAMUSCULAR; INTRAVENOUS at 22:28

## 2019-07-17 RX ADMIN — POTASSIUM CHLORIDE, DEXTROSE MONOHYDRATE AND SODIUM CHLORIDE: 150; 5; 450 INJECTION, SOLUTION INTRAVENOUS at 03:31

## 2019-07-17 RX ADMIN — PROMETHAZINE HYDROCHLORIDE 12.5 MG: 25 INJECTION, SOLUTION INTRAMUSCULAR; INTRAVENOUS at 13:34

## 2019-07-17 RX ADMIN — GABAPENTIN 800 MG: 400 CAPSULE ORAL at 13:34

## 2019-07-17 RX ADMIN — CEFEPIME HYDROCHLORIDE 2 G: 2 INJECTION, POWDER, FOR SOLUTION INTRAVENOUS at 10:52

## 2019-07-17 RX ADMIN — HYDROMORPHONE HYDROCHLORIDE 0.5 MG: 2 INJECTION, SOLUTION INTRAMUSCULAR; INTRAVENOUS; SUBCUTANEOUS at 08:17

## 2019-07-17 RX ADMIN — HYDROMORPHONE HYDROCHLORIDE 0.5 MG: 2 INJECTION, SOLUTION INTRAMUSCULAR; INTRAVENOUS; SUBCUTANEOUS at 22:18

## 2019-07-17 RX ADMIN — DICYCLOMINE HYDROCHLORIDE 20 MG: 20 TABLET ORAL at 08:06

## 2019-07-17 RX ADMIN — CEFEPIME HYDROCHLORIDE 2 G: 2 INJECTION, POWDER, FOR SOLUTION INTRAVENOUS at 19:32

## 2019-07-17 RX ADMIN — PROMETHAZINE HYDROCHLORIDE 12.5 MG: 25 INJECTION, SOLUTION INTRAMUSCULAR; INTRAVENOUS at 18:17

## 2019-07-17 RX ADMIN — SODIUM CHLORIDE, PRESERVATIVE FREE 10 ML: 5 INJECTION INTRAVENOUS at 08:09

## 2019-07-17 RX ADMIN — OYSTER SHELL CALCIUM WITH VITAMIN D 1 TABLET: 500; 200 TABLET, FILM COATED ORAL at 21:00

## 2019-07-17 RX ADMIN — PANTOPRAZOLE SODIUM 40 MG: 40 INJECTION, POWDER, FOR SOLUTION INTRAVENOUS at 21:00

## 2019-07-17 RX ADMIN — POTASSIUM CHLORIDE, DEXTROSE MONOHYDRATE AND SODIUM CHLORIDE: 150; 5; 450 INJECTION, SOLUTION INTRAVENOUS at 10:51

## 2019-07-17 RX ADMIN — GABAPENTIN 800 MG: 400 CAPSULE ORAL at 08:06

## 2019-07-17 RX ADMIN — HYDROMORPHONE HYDROCHLORIDE 0.5 MG: 2 INJECTION, SOLUTION INTRAMUSCULAR; INTRAVENOUS; SUBCUTANEOUS at 18:17

## 2019-07-17 RX ADMIN — DICYCLOMINE HYDROCHLORIDE 20 MG: 20 TABLET ORAL at 03:20

## 2019-07-17 RX ADMIN — MULTIPLE VITAMINS W/ MINERALS TAB 1 TABLET: TAB at 08:06

## 2019-07-17 RX ADMIN — PANTOPRAZOLE SODIUM 40 MG: 40 INJECTION, POWDER, FOR SOLUTION INTRAVENOUS at 08:07

## 2019-07-17 ASSESSMENT — PAIN DESCRIPTION - PROGRESSION
CLINICAL_PROGRESSION: NOT CHANGED

## 2019-07-17 ASSESSMENT — PAIN SCALES - WONG BAKER: WONGBAKER_NUMERICALRESPONSE: 0

## 2019-07-17 ASSESSMENT — PAIN SCALES - GENERAL
PAINLEVEL_OUTOF10: 7
PAINLEVEL_OUTOF10: 7
PAINLEVEL_OUTOF10: 8
PAINLEVEL_OUTOF10: 7

## 2019-07-17 ASSESSMENT — PAIN DESCRIPTION - LOCATION: LOCATION: ABDOMEN

## 2019-07-17 NOTE — PROGRESS NOTES
Attending Progress Note      PCP: Heather Leonard MD    Patient: Brielle Wu   Gender: female  : 1970   Age: 46 y.o. MRN: [id number]      Date of Admission: 2019    Chief Complaint:   Chief Complaint   Patient presents with    Abdominal Pain           Subjective:  Abdominal pain . . tolerating foot   No fever/chills , +  N/ no V/ no diarrhea     Medications:  Reviewed  Infusion Medications    lactated ringers 1,000 mL (19 1353)    dextrose 5% and 0.45% NaCl with KCl 20 mEq 150 mL/hr at 19 0331     Scheduled Medications    sodium chloride  250 mL Intravenous Once    sodium chloride flush  10 mL Intravenous 2 times per day    sodium chloride  250 mL Intravenous Once    atenolol  25 mg Oral Daily    calcium-vitamin D  1 tablet Oral BID    cloNIDine  0.1 mg Oral BID    vitamin D  5,000 Units Oral Daily    dicyclomine  20 mg Oral Q6H    estradiol  0.5 mg Oral Daily    ferrous sulfate  325 mg Oral BID    gabapentin  800 mg Oral TID    levothyroxine  125 mcg Oral Daily    therapeutic multivitamin-minerals  1 tablet Oral Daily    sodium chloride flush  10 mL Intravenous 2 times per day    sodium chloride (PF)  10 mL Intravenous Daily    cefepime  2 g Intravenous Q8H    pantoprazole  40 mg Intravenous BID     PRN Meds: sodium chloride flush, heparin flush, HYDROmorphone, promethazine, HYDROcodone-acetaminophen, sodium chloride flush, acetaminophen, ondansetron      Intake/Output Summary (Last 24 hours) at 2019 1020  Last data filed at 2019 2201  Gross per 24 hour   Intake 510 ml   Output --   Net 510 ml       Exam:  BP (!) 135/110   Pulse 66   Temp 98.5 °F (36.9 °C) (Oral)   Resp 19   Ht 5' 4\" (1.626 m)   Wt 261 lb 3.2 oz (118.5 kg)   SpO2 96%   BMI 44.83 kg/m²   General appearance: No distress,   Respiratory:  symmetrical , good air entry , no Rales , No wheezing, or rhonchi,  Cardiovascular: RRR, with normal S1/S2 without murmurs.   Abdomen : Soft,

## 2019-07-17 NOTE — PROGRESS NOTES
Catheter-related bloodstream infection  Resolved Problems:    * No resolved hospital problems. *  This dictation was created with voice recognition software. While attempts have been made to review the dictation as it is transcribed, on occasion the spoken word can be misinterpreted by the technology leading to omissions or inappropriate words, phrases or sentences.     Electronically signed by: Electronically signed by Mariam Ervin MD on 7/17/2019 at 7:27 PM, 7/17/2019 7:27 PM    Electronically signed by: Electronically signed by Mariam Ervin MD on 7/17/2019 at 11:22 AM

## 2019-07-17 NOTE — PROGRESS NOTES
GENERAL SURGERY PROGRESS NOTE    CC/HPI:           Patient feels better from yesterday's Mediport removal surgery. Vitals:    07/16/19 1632 07/16/19 2145 07/17/19 0326 07/17/19 0427   BP: (!) 111/51 (!) 115/52  (!) 92/54   Pulse: 63      Resp: 14      Temp: 98.4 °F (36.9 °C) 98.1 °F (36.7 °C) 97.2 °F (36.2 °C)    TempSrc: Oral Oral Oral    SpO2: 100%      Weight:       Height:         I/O last 3 completed shifts: In: 510 [I.V.:510]  Out: -   No intake/output data recorded.     DIET GENERAL;    Recent Results (from the past 48 hour(s))   Hemoglobin    Collection Time: 07/15/19 11:29 AM   Result Value Ref Range    Hemoglobin 8.4 (L) 12.5 - 16.0 GM/DL   Hemoglobin    Collection Time: 07/15/19  6:48 PM   Result Value Ref Range    Hemoglobin 8.3 (L) 12.5 - 16.0 GM/DL   Hemoglobin and hematocrit, blood    Collection Time: 07/16/19 12:45 AM   Result Value Ref Range    Hemoglobin 7.8 (L) 12.5 - 16.0 GM/DL    Hematocrit 26.3 (L) 37 - 47 %   Hemoglobin and hematocrit, blood    Collection Time: 07/16/19  7:00 AM   Result Value Ref Range    Hemoglobin 7.7 (L) 12.5 - 16.0 GM/DL    Hematocrit 25.5 (L) 37 - 47 %   PT    Collection Time: 07/16/19 12:17 PM   Result Value Ref Range    Protime 11.9 9.12 - 12.5 SECONDS    INR 1.04 INDEX   PTT    Collection Time: 07/16/19 12:17 PM   Result Value Ref Range    aPTT 33.9 (H) 21.2 - 33.0 SECONDS   Ionized Calcium    Collection Time: 07/16/19 12:17 PM   Result Value Ref Range    Ionized Ca 1.48 (H) 1.12 - 1.32 mMOL/L    Calcium, Ion 5.92 (H) 4.48 - 5.28 MG/DL   Hemoglobin and hematocrit, blood    Collection Time: 07/16/19 12:17 PM   Result Value Ref Range    Hemoglobin 8.1 (L) 12.5 - 16.0 GM/DL    Hematocrit 27.3 (L) 37 - 47 %   Surgical Culture    Collection Time: 07/16/19  4:00 PM   Result Value Ref Range    Specimen CHEST     Special Requests LEFT PORT SITE     Culture CULTURE IN PROGRESS    Surgical Culture    Collection Time: 07/16/19  4:00 PM   Result Value Ref Range

## 2019-07-18 LAB
ABO/RH: NORMAL
ANTIBODY SCREEN: NEGATIVE
APTT: 32 SECONDS (ref 21.2–33)
CALCIUM IONIZED: 5.44 MG/DL (ref 4.48–5.28)
COMPONENT: NORMAL
COMPONENT: NORMAL
CROSSMATCH RESULT: NORMAL
CROSSMATCH RESULT: NORMAL
HCT VFR BLD CALC: 22.9 % (ref 37–47)
HCT VFR BLD CALC: 25 % (ref 37–47)
HCT VFR BLD CALC: 29.5 % (ref 37–47)
HEMOGLOBIN: 7.1 GM/DL (ref 12.5–16)
HEMOGLOBIN: 8.7 GM/DL (ref 12.5–16)
HEMOGLOBIN: ABNORMAL GM/DL (ref 12.5–16)
HEMOGLOBIN: ABNORMAL GM/DL (ref 12.5–16)
INR BLD: 1.04 INDEX
IONIZED CA: 1.36 MMOL/L (ref 1.12–1.32)
PROTHROMBIN TIME: 11.8 SECONDS (ref 9.12–12.5)
STATUS: NORMAL
STATUS: NORMAL
TRANSFUSION STATUS: NORMAL
TRANSFUSION STATUS: NORMAL
UNIT DIVISION: 0
UNIT DIVISION: 0
UNIT NUMBER: NORMAL
UNIT NUMBER: NORMAL

## 2019-07-18 PROCEDURE — 6370000000 HC RX 637 (ALT 250 FOR IP): Performed by: SURGERY

## 2019-07-18 PROCEDURE — 99232 SBSQ HOSP IP/OBS MODERATE 35: CPT | Performed by: INTERNAL MEDICINE

## 2019-07-18 PROCEDURE — 94761 N-INVAS EAR/PLS OXIMETRY MLT: CPT

## 2019-07-18 PROCEDURE — C9113 INJ PANTOPRAZOLE SODIUM, VIA: HCPCS | Performed by: SURGERY

## 2019-07-18 PROCEDURE — 2140000000 HC CCU INTERMEDIATE R&B

## 2019-07-18 PROCEDURE — 85014 HEMATOCRIT: CPT

## 2019-07-18 PROCEDURE — 2580000003 HC RX 258: Performed by: SURGERY

## 2019-07-18 PROCEDURE — 85018 HEMOGLOBIN: CPT

## 2019-07-18 PROCEDURE — 6360000002 HC RX W HCPCS: Performed by: SURGERY

## 2019-07-18 PROCEDURE — 82330 ASSAY OF CALCIUM: CPT

## 2019-07-18 PROCEDURE — 85610 PROTHROMBIN TIME: CPT

## 2019-07-18 PROCEDURE — 2500000003 HC RX 250 WO HCPCS: Performed by: SURGERY

## 2019-07-18 PROCEDURE — 85730 THROMBOPLASTIN TIME PARTIAL: CPT

## 2019-07-18 RX ORDER — PANTOPRAZOLE SODIUM 40 MG/10ML
40 INJECTION, POWDER, LYOPHILIZED, FOR SOLUTION INTRAVENOUS ONCE
Status: COMPLETED | OUTPATIENT
Start: 2019-07-18 | End: 2019-07-18

## 2019-07-18 RX ADMIN — POTASSIUM CHLORIDE, DEXTROSE MONOHYDRATE AND SODIUM CHLORIDE: 150; 5; 450 INJECTION, SOLUTION INTRAVENOUS at 23:31

## 2019-07-18 RX ADMIN — HYDROMORPHONE HYDROCHLORIDE 0.5 MG: 2 INJECTION, SOLUTION INTRAMUSCULAR; INTRAVENOUS; SUBCUTANEOUS at 20:59

## 2019-07-18 RX ADMIN — Medication 5000 UNITS: at 08:31

## 2019-07-18 RX ADMIN — HYDROMORPHONE HYDROCHLORIDE 0.5 MG: 2 INJECTION, SOLUTION INTRAMUSCULAR; INTRAVENOUS; SUBCUTANEOUS at 16:08

## 2019-07-18 RX ADMIN — PANTOPRAZOLE SODIUM 40 MG: 40 INJECTION, POWDER, FOR SOLUTION INTRAVENOUS at 16:08

## 2019-07-18 RX ADMIN — FERROUS SULFATE TAB 325 MG (65 MG ELEMENTAL FE) 325 MG: 325 (65 FE) TAB at 08:31

## 2019-07-18 RX ADMIN — PROMETHAZINE HYDROCHLORIDE 12.5 MG: 25 INJECTION, SOLUTION INTRAMUSCULAR; INTRAVENOUS at 16:08

## 2019-07-18 RX ADMIN — POTASSIUM CHLORIDE, DEXTROSE MONOHYDRATE AND SODIUM CHLORIDE: 150; 5; 450 INJECTION, SOLUTION INTRAVENOUS at 16:07

## 2019-07-18 RX ADMIN — DICYCLOMINE HYDROCHLORIDE 20 MG: 20 TABLET ORAL at 16:08

## 2019-07-18 RX ADMIN — SODIUM CHLORIDE, PRESERVATIVE FREE 10 ML: 5 INJECTION INTRAVENOUS at 21:00

## 2019-07-18 RX ADMIN — GABAPENTIN 800 MG: 400 CAPSULE ORAL at 12:16

## 2019-07-18 RX ADMIN — MULTIPLE VITAMINS W/ MINERALS TAB 1 TABLET: TAB at 08:31

## 2019-07-18 RX ADMIN — GABAPENTIN 800 MG: 400 CAPSULE ORAL at 20:59

## 2019-07-18 RX ADMIN — HYDROMORPHONE HYDROCHLORIDE 0.5 MG: 2 INJECTION, SOLUTION INTRAMUSCULAR; INTRAVENOUS; SUBCUTANEOUS at 08:29

## 2019-07-18 RX ADMIN — POTASSIUM CHLORIDE, DEXTROSE MONOHYDRATE AND SODIUM CHLORIDE: 150; 5; 450 INJECTION, SOLUTION INTRAVENOUS at 02:22

## 2019-07-18 RX ADMIN — PROMETHAZINE HYDROCHLORIDE 12.5 MG: 25 INJECTION, SOLUTION INTRAMUSCULAR; INTRAVENOUS at 12:16

## 2019-07-18 RX ADMIN — CLONIDINE HYDROCHLORIDE 0.1 MG: 0.1 TABLET ORAL at 20:59

## 2019-07-18 RX ADMIN — GABAPENTIN 800 MG: 400 CAPSULE ORAL at 08:31

## 2019-07-18 RX ADMIN — HYDROMORPHONE HYDROCHLORIDE 0.5 MG: 2 INJECTION, SOLUTION INTRAMUSCULAR; INTRAVENOUS; SUBCUTANEOUS at 12:16

## 2019-07-18 RX ADMIN — CEFEPIME HYDROCHLORIDE 2 G: 2 INJECTION, POWDER, FOR SOLUTION INTRAVENOUS at 21:00

## 2019-07-18 RX ADMIN — SODIUM CHLORIDE 8 MG/HR: 9 INJECTION, SOLUTION INTRAVENOUS at 23:31

## 2019-07-18 RX ADMIN — POTASSIUM CHLORIDE, DEXTROSE MONOHYDRATE AND SODIUM CHLORIDE: 150; 5; 450 INJECTION, SOLUTION INTRAVENOUS at 09:43

## 2019-07-18 RX ADMIN — OYSTER SHELL CALCIUM WITH VITAMIN D 1 TABLET: 500; 200 TABLET, FILM COATED ORAL at 08:31

## 2019-07-18 RX ADMIN — DICYCLOMINE HYDROCHLORIDE 20 MG: 20 TABLET ORAL at 23:31

## 2019-07-18 RX ADMIN — OYSTER SHELL CALCIUM WITH VITAMIN D 1 TABLET: 500; 200 TABLET, FILM COATED ORAL at 20:59

## 2019-07-18 RX ADMIN — PANTOPRAZOLE SODIUM 40 MG: 40 INJECTION, POWDER, FOR SOLUTION INTRAVENOUS at 08:29

## 2019-07-18 RX ADMIN — LEVOTHYROXINE SODIUM 125 MCG: 125 TABLET ORAL at 06:10

## 2019-07-18 RX ADMIN — PROMETHAZINE HYDROCHLORIDE 12.5 MG: 25 INJECTION, SOLUTION INTRAMUSCULAR; INTRAVENOUS at 08:29

## 2019-07-18 RX ADMIN — FERROUS SULFATE TAB 325 MG (65 MG ELEMENTAL FE) 325 MG: 325 (65 FE) TAB at 20:59

## 2019-07-18 RX ADMIN — DICYCLOMINE HYDROCHLORIDE 20 MG: 20 TABLET ORAL at 04:07

## 2019-07-18 RX ADMIN — PROMETHAZINE HYDROCHLORIDE 12.5 MG: 25 INJECTION, SOLUTION INTRAMUSCULAR; INTRAVENOUS at 02:49

## 2019-07-18 RX ADMIN — PROMETHAZINE HYDROCHLORIDE 12.5 MG: 25 INJECTION, SOLUTION INTRAMUSCULAR; INTRAVENOUS at 20:59

## 2019-07-18 RX ADMIN — CEFEPIME HYDROCHLORIDE 2 G: 2 INJECTION, POWDER, FOR SOLUTION INTRAVENOUS at 11:15

## 2019-07-18 RX ADMIN — DICYCLOMINE HYDROCHLORIDE 20 MG: 20 TABLET ORAL at 08:30

## 2019-07-18 RX ADMIN — CEFEPIME HYDROCHLORIDE 2 G: 2 INJECTION, POWDER, FOR SOLUTION INTRAVENOUS at 04:07

## 2019-07-18 RX ADMIN — ATENOLOL 25 MG: 25 TABLET ORAL at 08:31

## 2019-07-18 RX ADMIN — ESTRADIOL 0.5 MG: 1 TABLET ORAL at 08:31

## 2019-07-18 RX ADMIN — HYDROMORPHONE HYDROCHLORIDE 0.5 MG: 2 INJECTION, SOLUTION INTRAMUSCULAR; INTRAVENOUS; SUBCUTANEOUS at 02:48

## 2019-07-18 RX ADMIN — SODIUM CHLORIDE 8 MG/HR: 9 INJECTION, SOLUTION INTRAVENOUS at 16:08

## 2019-07-18 ASSESSMENT — PAIN DESCRIPTION - PROGRESSION
CLINICAL_PROGRESSION: NOT CHANGED

## 2019-07-18 ASSESSMENT — PAIN SCALES - GENERAL
PAINLEVEL_OUTOF10: 9
PAINLEVEL_OUTOF10: 7

## 2019-07-18 NOTE — PROGRESS NOTES
This nurse was called into patient room. Patient threw up bright red vomit into spit basin. Dr. Sadie Jolley and Dr. Sarah madrigal served. New orders placed.

## 2019-07-19 LAB
CALCIUM IONIZED: 5.96 MG/DL (ref 4.48–5.28)
CULTURE: ABNORMAL
CULTURE: NORMAL
GRAM SMEAR: ABNORMAL
HCT VFR BLD CALC: 25.5 % (ref 37–47)
HCT VFR BLD CALC: 27.3 % (ref 37–47)
HEMOGLOBIN: 7.3 GM/DL (ref 12.5–16)
HEMOGLOBIN: 8 GM/DL (ref 12.5–16)
INR BLD: 0.97 INDEX
IONIZED CA: 1.49 MMOL/L (ref 1.12–1.32)
Lab: ABNORMAL
Lab: NORMAL
PROTHROMBIN TIME: 11.1 SECONDS (ref 9.12–12.5)
SPECIMEN: ABNORMAL
SPECIMEN: NORMAL

## 2019-07-19 PROCEDURE — 6370000000 HC RX 637 (ALT 250 FOR IP): Performed by: SURGERY

## 2019-07-19 PROCEDURE — 99232 SBSQ HOSP IP/OBS MODERATE 35: CPT | Performed by: INTERNAL MEDICINE

## 2019-07-19 PROCEDURE — 6360000002 HC RX W HCPCS: Performed by: SURGERY

## 2019-07-19 PROCEDURE — 2500000003 HC RX 250 WO HCPCS: Performed by: SURGERY

## 2019-07-19 PROCEDURE — C9113 INJ PANTOPRAZOLE SODIUM, VIA: HCPCS | Performed by: SURGERY

## 2019-07-19 PROCEDURE — 2580000003 HC RX 258: Performed by: SURGERY

## 2019-07-19 PROCEDURE — 85018 HEMOGLOBIN: CPT

## 2019-07-19 PROCEDURE — 87040 BLOOD CULTURE FOR BACTERIA: CPT

## 2019-07-19 PROCEDURE — 85610 PROTHROMBIN TIME: CPT

## 2019-07-19 PROCEDURE — 2140000000 HC CCU INTERMEDIATE R&B

## 2019-07-19 PROCEDURE — 94761 N-INVAS EAR/PLS OXIMETRY MLT: CPT

## 2019-07-19 PROCEDURE — 82330 ASSAY OF CALCIUM: CPT

## 2019-07-19 PROCEDURE — 85014 HEMATOCRIT: CPT

## 2019-07-19 RX ADMIN — LEVOTHYROXINE SODIUM 125 MCG: 125 TABLET ORAL at 04:17

## 2019-07-19 RX ADMIN — GABAPENTIN 800 MG: 400 CAPSULE ORAL at 08:06

## 2019-07-19 RX ADMIN — CLONIDINE HYDROCHLORIDE 0.1 MG: 0.1 TABLET ORAL at 08:07

## 2019-07-19 RX ADMIN — DICYCLOMINE HYDROCHLORIDE 20 MG: 20 TABLET ORAL at 16:11

## 2019-07-19 RX ADMIN — SODIUM CHLORIDE, PRESERVATIVE FREE 10 ML: 5 INJECTION INTRAVENOUS at 20:39

## 2019-07-19 RX ADMIN — Medication 5000 UNITS: at 08:06

## 2019-07-19 RX ADMIN — DICYCLOMINE HYDROCHLORIDE 20 MG: 20 TABLET ORAL at 10:24

## 2019-07-19 RX ADMIN — HYDROMORPHONE HYDROCHLORIDE 0.5 MG: 2 INJECTION, SOLUTION INTRAMUSCULAR; INTRAVENOUS; SUBCUTANEOUS at 20:38

## 2019-07-19 RX ADMIN — GABAPENTIN 800 MG: 400 CAPSULE ORAL at 12:07

## 2019-07-19 RX ADMIN — SODIUM CHLORIDE 8 MG/HR: 9 INJECTION, SOLUTION INTRAVENOUS at 20:38

## 2019-07-19 RX ADMIN — PROMETHAZINE HYDROCHLORIDE 12.5 MG: 25 INJECTION, SOLUTION INTRAMUSCULAR; INTRAVENOUS at 12:08

## 2019-07-19 RX ADMIN — FERROUS SULFATE TAB 325 MG (65 MG ELEMENTAL FE) 325 MG: 325 (65 FE) TAB at 08:07

## 2019-07-19 RX ADMIN — PROMETHAZINE HYDROCHLORIDE 12.5 MG: 25 INJECTION, SOLUTION INTRAMUSCULAR; INTRAVENOUS at 16:10

## 2019-07-19 RX ADMIN — PROMETHAZINE HYDROCHLORIDE 12.5 MG: 25 INJECTION, SOLUTION INTRAMUSCULAR; INTRAVENOUS at 20:38

## 2019-07-19 RX ADMIN — ATENOLOL 25 MG: 25 TABLET ORAL at 08:07

## 2019-07-19 RX ADMIN — CEFEPIME HYDROCHLORIDE 2 G: 2 INJECTION, POWDER, FOR SOLUTION INTRAVENOUS at 12:07

## 2019-07-19 RX ADMIN — POTASSIUM CHLORIDE, DEXTROSE MONOHYDRATE AND SODIUM CHLORIDE: 150; 5; 450 INJECTION, SOLUTION INTRAVENOUS at 20:38

## 2019-07-19 RX ADMIN — ESTRADIOL 0.5 MG: 1 TABLET ORAL at 08:06

## 2019-07-19 RX ADMIN — CEFEPIME HYDROCHLORIDE 2 G: 2 INJECTION, POWDER, FOR SOLUTION INTRAVENOUS at 20:39

## 2019-07-19 RX ADMIN — HYDROMORPHONE HYDROCHLORIDE 0.5 MG: 2 INJECTION, SOLUTION INTRAMUSCULAR; INTRAVENOUS; SUBCUTANEOUS at 00:35

## 2019-07-19 RX ADMIN — SODIUM CHLORIDE 8 MG/HR: 9 INJECTION, SOLUTION INTRAVENOUS at 10:24

## 2019-07-19 RX ADMIN — HYDROMORPHONE HYDROCHLORIDE 0.5 MG: 2 INJECTION, SOLUTION INTRAMUSCULAR; INTRAVENOUS; SUBCUTANEOUS at 04:17

## 2019-07-19 RX ADMIN — POTASSIUM CHLORIDE, DEXTROSE MONOHYDRATE AND SODIUM CHLORIDE: 150; 5; 450 INJECTION, SOLUTION INTRAVENOUS at 10:24

## 2019-07-19 RX ADMIN — OYSTER SHELL CALCIUM WITH VITAMIN D 1 TABLET: 500; 200 TABLET, FILM COATED ORAL at 10:24

## 2019-07-19 RX ADMIN — FERROUS SULFATE TAB 325 MG (65 MG ELEMENTAL FE) 325 MG: 325 (65 FE) TAB at 20:38

## 2019-07-19 RX ADMIN — PROMETHAZINE HYDROCHLORIDE 12.5 MG: 25 INJECTION, SOLUTION INTRAMUSCULAR; INTRAVENOUS at 08:04

## 2019-07-19 RX ADMIN — PROMETHAZINE HYDROCHLORIDE 12.5 MG: 25 INJECTION, SOLUTION INTRAMUSCULAR; INTRAVENOUS at 03:22

## 2019-07-19 RX ADMIN — CEFEPIME HYDROCHLORIDE 2 G: 2 INJECTION, POWDER, FOR SOLUTION INTRAVENOUS at 05:42

## 2019-07-19 RX ADMIN — SODIUM CHLORIDE, PRESERVATIVE FREE 10 ML: 5 INJECTION INTRAVENOUS at 08:10

## 2019-07-19 RX ADMIN — DICYCLOMINE HYDROCHLORIDE 20 MG: 20 TABLET ORAL at 04:17

## 2019-07-19 RX ADMIN — MULTIPLE VITAMINS W/ MINERALS TAB 1 TABLET: TAB at 08:07

## 2019-07-19 RX ADMIN — HYDROMORPHONE HYDROCHLORIDE 0.5 MG: 2 INJECTION, SOLUTION INTRAMUSCULAR; INTRAVENOUS; SUBCUTANEOUS at 11:27

## 2019-07-19 RX ADMIN — HYDROMORPHONE HYDROCHLORIDE 0.5 MG: 2 INJECTION, SOLUTION INTRAMUSCULAR; INTRAVENOUS; SUBCUTANEOUS at 08:05

## 2019-07-19 RX ADMIN — HYDROMORPHONE HYDROCHLORIDE 0.5 MG: 2 INJECTION, SOLUTION INTRAMUSCULAR; INTRAVENOUS; SUBCUTANEOUS at 16:11

## 2019-07-19 RX ADMIN — GABAPENTIN 800 MG: 400 CAPSULE ORAL at 20:38

## 2019-07-19 ASSESSMENT — PAIN SCALES - GENERAL
PAINLEVEL_OUTOF10: 9
PAINLEVEL_OUTOF10: 7
PAINLEVEL_OUTOF10: 7
PAINLEVEL_OUTOF10: 8
PAINLEVEL_OUTOF10: 7

## 2019-07-19 NOTE — PROGRESS NOTES
Infectious Disease Progress Note  2019   Patient Name: Andrea Man : 1970   Impression  · Sepsis secondary to Pseudomonas aeruginosa catheter related blood stream infection (CRBSI)  ? Afebrile for >48 h, operative site is firm and tender. ? S/p removal of mediport on   ? Cx in progress- Pseudomonas aeruginosa  ? Leukopenia may be due to antibiotics or sepsis  · Left first metatarsal MSSA osteomyelitis  ? Originally scheduled to complete IV cefazolin on 2019 but cefepime will provide coverage of MSSA. · Hx of gastric sleeve surgery  · Anemia  · Hematemesis  ? Surgery and GI on service. ? RUQ pain  § Improving    · Multi-morbidity: per PMHx obesity  Plan:  · Continue cefepime 2 g IV every 8 hours, for at least two weeks  ? Surgery service to determine if there is a need for re-exploration of the wound  ? Repeat blood cx  ? CBC       Ongoing Antimicrobial Therapy  Cefepime 7/15-? Completed Antimicrobial Therapy  Vancomycin -15 ? History:? Interval history noted, P aeruginosa CRBSI  Continues to have intermittent nausea and hematemesis  Physical Exam:  Vital Signs: /78   Pulse 85   Temp 98.8 °F (37.1 °C) (Oral)   Resp 15   Ht 5' 4\" (1.626 m)   Wt 261 lb 3.2 oz (118.5 kg)   SpO2 93%   BMI 44.83 kg/m²     Gen: alert and oriented X3, no distress  Skin: no stigmata of endocarditis  Wounds: left ACW incision, firm and tender, left foot incision intact  HEMT: AT/NC Oropharynx pink, moist, and without lesions or exudates; dentition in good state of repair  Eyes: PERRLA, EOMI, conjunctiva pink, sclera anicteric. Neck: Supple. Trachea midline. No LAD. Chest: no distress and CTA. Good air movement. Heart: RRR and no MRG. Abd: soft, non-distended, RUQtenderness, no hepatomegaly. Normoactive bowel sounds. Ext: no clubbing, cyanosis, or edema  Catheter Site: without erythema or tenderness  Neuro: Mental status intact.  CN 2-12 intact and no focal sensory or motor deficits

## 2019-07-19 NOTE — PROGRESS NOTES
bilaterally. No DVT signs ,    Skin: Skin color, texture, turgor normal.  No rashes or lesions. Neurologic:  Alert and oriented , no focal sensory/motor deficits , grossly non-focal.  Psychiatric: Alert and oriented, thought content appropriate, normal insight      Labs:   Recent Labs     07/18/19  1115 07/18/19  1227 07/18/19  1830   HGB 6.4  CALLED 3N ON [id number] AT 1147 SPOKE WITH ARGENTINA AMAYA RN, KSTOPORSCHE MLT  RESULTS READ BACK  *  CORRECTED ON 07/18 AT 1255: PREVIOUSLY REPORTED AS: CALLED 3N ON [id number] AT 1147 SPOKE WITH ARGENTINA AMAYA RN, KSTOVER MLT RESULTS READ BACK* 7.1* 8.7*   HCT 22.9* 25.0* 29.5*     No results for input(s): NA, K, CL, CO2, BUN, CREATININE, CALCIUM, PHOS in the last 72 hours. Invalid input(s): MAGNES  No results for input(s): AST, ALT, BILIDIR, BILITOT, ALKPHOS in the last 72 hours. Recent Labs     07/16/19  1217 07/17/19  0706 07/18/19  0615   INR 1.04 1.01 1.04     No results for input(s): CKTOTAL, TROPONINI in the last 72 hours. Assessment/Plan:    Active Hospital Problems    Diagnosis Date Noted    Fever [R50.9] 11/16/2011     Priority: High    Abdominal pain, right upper quadrant [R10.11] 07/14/2019     Priority: Medium    Anemia [D64.9] 07/23/2015     Priority: Medium    Sepsis due to Pseudomonas species ([facility] [address] [A41.52]     Bacteremia due to Pseudomonas [R78.81]     Catheter-related bloodstream infection [T80.211A]     History of bacteremia [Z87.898] 07/14/2019    Status post bariatric surgery [Z98.84] 02/22/2019    Gastroesophageal reflux disease without esophagitis [K21.9] 04/28/2016    Upper GI bleed [K92.2] 04/19/2013    Lupus (systemic lupus erythematosus) ([facility] [address] [M32.9] 04/19/2013     csm   Port removed  . Gui Gifford Port tip c/s : pending . Gui Gifford Cont IV ABX by PICC line . Gui Gifford Plan for port in 2 weeks   Admitted  to upper GI bleeding protocol , monitor H/H 7.5      EGD by  Dr Yue Cooper  If needed   . .cont protonix drip , IVF   blood c/s pseudomonas aeruginosa . Gui Gifford  Sensitive

## 2019-07-19 NOTE — PLAN OF CARE
Problem: Pain:  Goal: Pain level will decrease  Description  Pain level will decrease  Outcome: Ongoing  Goal: Control of acute pain  Description  Control of acute pain  Outcome: Ongoing  Goal: Control of chronic pain  Description  Control of chronic pain  Outcome: Ongoing     Problem: Mobility - Impaired:  Goal: Mobility will improve  Description  Mobility will improve  Outcome: Ongoing     Problem: Falls - Risk of:  Goal: Will remain free from falls  Description  Will remain free from falls  Outcome: Ongoing  Goal: Absence of physical injury  Description  Absence of physical injury  Outcome: Ongoing     Problem: Activity:  Goal: Risk for activity intolerance will decrease  Description  Risk for activity intolerance will decrease  Outcome: Ongoing     Problem:  Bowel/Gastric:  Goal: Bowel function will improve  Description  Bowel function will improve  Outcome: Ongoing  Goal: Diagnostic test results will improve  Description  Diagnostic test results will improve  Outcome: Ongoing  Goal: Occurrences of nausea will decrease  Description  Occurrences of nausea will decrease  Outcome: Ongoing  Goal: Occurrences of vomiting will decrease  Description  Occurrences of vomiting will decrease  Outcome: Ongoing     Problem: Fluid Volume:  Goal: Maintenance of adequate hydration will improve  Description  Maintenance of adequate hydration will improve  Outcome: Ongoing     Problem: Health Behavior:  Goal: Ability to state signs and symptoms to report to health care provider will improve  Description  Ability to state signs and symptoms to report to health care provider will improve  Outcome: Ongoing     Problem: Physical Regulation:  Goal: Complications related to the disease process, condition or treatment will be avoided or minimized  Description  Complications related to the disease process, condition or treatment will be avoided or minimized  Outcome: Ongoing  Goal: Ability to maintain clinical measurements within normal

## 2019-07-20 LAB
ALBUMIN SERPL-MCNC: 3.9 GM/DL (ref 3.4–5)
ALP BLD-CCNC: 111 IU/L (ref 40–128)
ALT SERPL-CCNC: 14 U/L (ref 10–40)
ANION GAP SERPL CALCULATED.3IONS-SCNC: 9 MMOL/L (ref 4–16)
APTT: 31.4 SECONDS (ref 21.2–33)
AST SERPL-CCNC: 23 IU/L (ref 15–37)
BILIRUB SERPL-MCNC: 0.2 MG/DL (ref 0–1)
BUN BLDV-MCNC: 10 MG/DL (ref 6–23)
CALCIUM IONIZED: 5.64 MG/DL (ref 4.48–5.28)
CALCIUM SERPL-MCNC: 10.6 MG/DL (ref 8.3–10.6)
CHLORIDE BLD-SCNC: 107 MMOL/L (ref 99–110)
CO2: 25 MMOL/L (ref 21–32)
CREAT SERPL-MCNC: 0.6 MG/DL (ref 0.6–1.1)
CULTURE: ABNORMAL
CULTURE: ABNORMAL
GFR AFRICAN AMERICAN: >60 ML/MIN/1.73M2
GFR NON-AFRICAN AMERICAN: >60 ML/MIN/1.73M2
GLUCOSE BLD-MCNC: 94 MG/DL (ref 70–99)
GRAM SMEAR: ABNORMAL
HCT VFR BLD CALC: 27 % (ref 37–47)
HEMOGLOBIN: 7.9 GM/DL (ref 12.5–16)
INR BLD: 0.96 INDEX
IONIZED CA: 1.41 MMOL/L (ref 1.12–1.32)
Lab: ABNORMAL
MCH RBC QN AUTO: 24.1 PG (ref 27–31)
MCHC RBC AUTO-ENTMCNC: 29.3 % (ref 32–36)
MCV RBC AUTO: 82.3 FL (ref 78–100)
PDW BLD-RTO: 15.4 % (ref 11.7–14.9)
PLATELET # BLD: 167 K/CU MM (ref 140–440)
PMV BLD AUTO: 11 FL (ref 7.5–11.1)
POTASSIUM SERPL-SCNC: 5 MMOL/L (ref 3.5–5.1)
PROTHROMBIN TIME: 11 SECONDS (ref 9.12–12.5)
RBC # BLD: 3.28 M/CU MM (ref 4.2–5.4)
SODIUM BLD-SCNC: 141 MMOL/L (ref 135–145)
SPECIMEN: ABNORMAL
TOTAL PROTEIN: 6.2 GM/DL (ref 6.4–8.2)
WBC # BLD: 3.4 K/CU MM (ref 4–10.5)

## 2019-07-20 PROCEDURE — 6360000002 HC RX W HCPCS: Performed by: SURGERY

## 2019-07-20 PROCEDURE — 85610 PROTHROMBIN TIME: CPT

## 2019-07-20 PROCEDURE — 2140000000 HC CCU INTERMEDIATE R&B

## 2019-07-20 PROCEDURE — 80053 COMPREHEN METABOLIC PANEL: CPT

## 2019-07-20 PROCEDURE — 85027 COMPLETE CBC AUTOMATED: CPT

## 2019-07-20 PROCEDURE — 85730 THROMBOPLASTIN TIME PARTIAL: CPT

## 2019-07-20 PROCEDURE — 2580000003 HC RX 258: Performed by: SURGERY

## 2019-07-20 PROCEDURE — 99232 SBSQ HOSP IP/OBS MODERATE 35: CPT | Performed by: SURGERY

## 2019-07-20 PROCEDURE — 6370000000 HC RX 637 (ALT 250 FOR IP): Performed by: SURGERY

## 2019-07-20 PROCEDURE — 2500000003 HC RX 250 WO HCPCS: Performed by: SURGERY

## 2019-07-20 PROCEDURE — 82330 ASSAY OF CALCIUM: CPT

## 2019-07-20 PROCEDURE — 94761 N-INVAS EAR/PLS OXIMETRY MLT: CPT

## 2019-07-20 PROCEDURE — C9113 INJ PANTOPRAZOLE SODIUM, VIA: HCPCS | Performed by: SURGERY

## 2019-07-20 PROCEDURE — 85018 HEMOGLOBIN: CPT

## 2019-07-20 RX ORDER — SUCRALFATE 1 G/1
1 TABLET ORAL EVERY 6 HOURS SCHEDULED
Status: DISCONTINUED | OUTPATIENT
Start: 2019-07-20 | End: 2019-07-22

## 2019-07-20 RX ADMIN — SUCRALFATE 1 G: 1 TABLET ORAL at 18:28

## 2019-07-20 RX ADMIN — GABAPENTIN 800 MG: 400 CAPSULE ORAL at 08:52

## 2019-07-20 RX ADMIN — GABAPENTIN 800 MG: 400 CAPSULE ORAL at 20:36

## 2019-07-20 RX ADMIN — OYSTER SHELL CALCIUM WITH VITAMIN D 1 TABLET: 500; 200 TABLET, FILM COATED ORAL at 09:02

## 2019-07-20 RX ADMIN — HYDROCODONE BITARTRATE AND ACETAMINOPHEN 1 TABLET: 7.5; 325 TABLET ORAL at 12:43

## 2019-07-20 RX ADMIN — CEFEPIME HYDROCHLORIDE 2 G: 2 INJECTION, POWDER, FOR SOLUTION INTRAVENOUS at 15:20

## 2019-07-20 RX ADMIN — HYDROMORPHONE HYDROCHLORIDE 0.5 MG: 2 INJECTION, SOLUTION INTRAMUSCULAR; INTRAVENOUS; SUBCUTANEOUS at 17:39

## 2019-07-20 RX ADMIN — CEFEPIME HYDROCHLORIDE 2 G: 2 INJECTION, POWDER, FOR SOLUTION INTRAVENOUS at 22:10

## 2019-07-20 RX ADMIN — HYDROMORPHONE HYDROCHLORIDE 0.5 MG: 2 INJECTION, SOLUTION INTRAMUSCULAR; INTRAVENOUS; SUBCUTANEOUS at 03:58

## 2019-07-20 RX ADMIN — ONDANSETRON 4 MG: 2 INJECTION INTRAMUSCULAR; INTRAVENOUS at 08:51

## 2019-07-20 RX ADMIN — PROMETHAZINE HYDROCHLORIDE 12.5 MG: 25 INJECTION, SOLUTION INTRAMUSCULAR; INTRAVENOUS at 10:47

## 2019-07-20 RX ADMIN — PROMETHAZINE HYDROCHLORIDE 12.5 MG: 25 INJECTION, SOLUTION INTRAMUSCULAR; INTRAVENOUS at 20:36

## 2019-07-20 RX ADMIN — FERROUS SULFATE TAB 325 MG (65 MG ELEMENTAL FE) 325 MG: 325 (65 FE) TAB at 20:36

## 2019-07-20 RX ADMIN — POTASSIUM CHLORIDE, DEXTROSE MONOHYDRATE AND SODIUM CHLORIDE: 150; 5; 450 INJECTION, SOLUTION INTRAVENOUS at 12:45

## 2019-07-20 RX ADMIN — OYSTER SHELL CALCIUM WITH VITAMIN D 1 TABLET: 500; 200 TABLET, FILM COATED ORAL at 20:35

## 2019-07-20 RX ADMIN — ESTRADIOL 0.5 MG: 1 TABLET ORAL at 09:02

## 2019-07-20 RX ADMIN — SODIUM CHLORIDE, PRESERVATIVE FREE 10 ML: 5 INJECTION INTRAVENOUS at 20:36

## 2019-07-20 RX ADMIN — DICYCLOMINE HYDROCHLORIDE 20 MG: 20 TABLET ORAL at 17:39

## 2019-07-20 RX ADMIN — PROMETHAZINE HYDROCHLORIDE 12.5 MG: 25 INJECTION, SOLUTION INTRAMUSCULAR; INTRAVENOUS at 15:09

## 2019-07-20 RX ADMIN — HYDROMORPHONE HYDROCHLORIDE 0.5 MG: 2 INJECTION, SOLUTION INTRAMUSCULAR; INTRAVENOUS; SUBCUTANEOUS at 14:09

## 2019-07-20 RX ADMIN — DICYCLOMINE HYDROCHLORIDE 20 MG: 20 TABLET ORAL at 12:43

## 2019-07-20 RX ADMIN — HYDROMORPHONE HYDROCHLORIDE 0.5 MG: 2 INJECTION, SOLUTION INTRAMUSCULAR; INTRAVENOUS; SUBCUTANEOUS at 20:40

## 2019-07-20 RX ADMIN — PROMETHAZINE HYDROCHLORIDE 12.5 MG: 25 INJECTION, SOLUTION INTRAMUSCULAR; INTRAVENOUS at 01:45

## 2019-07-20 RX ADMIN — Medication 5000 UNITS: at 08:52

## 2019-07-20 RX ADMIN — LEVOTHYROXINE SODIUM 125 MCG: 125 TABLET ORAL at 03:59

## 2019-07-20 RX ADMIN — POTASSIUM CHLORIDE, DEXTROSE MONOHYDRATE AND SODIUM CHLORIDE: 150; 5; 450 INJECTION, SOLUTION INTRAVENOUS at 20:35

## 2019-07-20 RX ADMIN — SODIUM CHLORIDE 8 MG/HR: 9 INJECTION, SOLUTION INTRAVENOUS at 19:21

## 2019-07-20 RX ADMIN — SODIUM CHLORIDE 8 MG/HR: 9 INJECTION, SOLUTION INTRAVENOUS at 07:41

## 2019-07-20 RX ADMIN — GABAPENTIN 800 MG: 400 CAPSULE ORAL at 15:08

## 2019-07-20 RX ADMIN — ATENOLOL 25 MG: 25 TABLET ORAL at 08:52

## 2019-07-20 RX ADMIN — PROMETHAZINE HYDROCHLORIDE 12.5 MG: 25 INJECTION, SOLUTION INTRAMUSCULAR; INTRAVENOUS at 05:26

## 2019-07-20 RX ADMIN — CEFEPIME HYDROCHLORIDE 2 G: 2 INJECTION, POWDER, FOR SOLUTION INTRAVENOUS at 05:26

## 2019-07-20 RX ADMIN — DICYCLOMINE HYDROCHLORIDE 20 MG: 20 TABLET ORAL at 03:59

## 2019-07-20 RX ADMIN — FERROUS SULFATE TAB 325 MG (65 MG ELEMENTAL FE) 325 MG: 325 (65 FE) TAB at 08:52

## 2019-07-20 RX ADMIN — HYDROMORPHONE HYDROCHLORIDE 0.5 MG: 2 INJECTION, SOLUTION INTRAMUSCULAR; INTRAVENOUS; SUBCUTANEOUS at 00:10

## 2019-07-20 RX ADMIN — MULTIPLE VITAMINS W/ MINERALS TAB 1 TABLET: TAB at 08:52

## 2019-07-20 RX ADMIN — POTASSIUM CHLORIDE, DEXTROSE MONOHYDRATE AND SODIUM CHLORIDE: 150; 5; 450 INJECTION, SOLUTION INTRAVENOUS at 05:26

## 2019-07-20 RX ADMIN — HYDROMORPHONE HYDROCHLORIDE 0.5 MG: 2 INJECTION, SOLUTION INTRAMUSCULAR; INTRAVENOUS; SUBCUTANEOUS at 08:51

## 2019-07-20 RX ADMIN — ONDANSETRON 4 MG: 2 INJECTION INTRAMUSCULAR; INTRAVENOUS at 17:39

## 2019-07-20 ASSESSMENT — PAIN DESCRIPTION - LOCATION
LOCATION: ABDOMEN
LOCATION: ABDOMEN

## 2019-07-20 ASSESSMENT — PAIN SCALES - GENERAL
PAINLEVEL_OUTOF10: 7
PAINLEVEL_OUTOF10: 8
PAINLEVEL_OUTOF10: 7
PAINLEVEL_OUTOF10: 4

## 2019-07-20 ASSESSMENT — PAIN DESCRIPTION - DESCRIPTORS: DESCRIPTORS: ACHING

## 2019-07-20 ASSESSMENT — PAIN DESCRIPTION - PAIN TYPE
TYPE: ACUTE PAIN
TYPE: ACUTE PAIN

## 2019-07-20 ASSESSMENT — PAIN DESCRIPTION - ONSET: ONSET: ON-GOING

## 2019-07-20 ASSESSMENT — PAIN SCALES - WONG BAKER
WONGBAKER_NUMERICALRESPONSE: 0
WONGBAKER_NUMERICALRESPONSE: 0

## 2019-07-20 ASSESSMENT — PAIN DESCRIPTION - FREQUENCY: FREQUENCY: INTERMITTENT

## 2019-07-20 ASSESSMENT — PAIN DESCRIPTION - PROGRESSION
CLINICAL_PROGRESSION: NOT CHANGED
CLINICAL_PROGRESSION: NOT CHANGED

## 2019-07-20 ASSESSMENT — PAIN DESCRIPTION - ORIENTATION: ORIENTATION: RIGHT

## 2019-07-20 NOTE — PLAN OF CARE
Problem: Pain:  Goal: Pain level will decrease  Description  Pain level will decrease  7/20/2019 1138 by Prince Noam RN  Outcome: Ongoing  7/20/2019 0119 by Janee Mcguire RN  Outcome: Ongoing  7/20/2019 0119 by Janee Mcguire RN  Outcome: Ongoing  Goal: Control of acute pain  Description  Control of acute pain  7/20/2019 0119 by Janee Mcguire RN  Outcome: Ongoing  7/20/2019 0119 by Janee Mcguire RN  Outcome: Ongoing  Goal: Control of chronic pain  Description  Control of chronic pain  7/20/2019 0119 by Janee Mcguire RN  Outcome: Ongoing  7/20/2019 0119 by Janee Mcguire RN  Outcome: Ongoing     Problem: Mobility - Impaired:  Goal: Mobility will improve  Description  Mobility will improve  7/20/2019 1138 by Prince Noam RN  Outcome: Ongoing  7/20/2019 0119 by Janee Mcguire RN  Outcome: Ongoing  7/20/2019 0119 by Janee Mcguire RN  Outcome: Ongoing     Problem: Falls - Risk of:  Goal: Will remain free from falls  Description  Will remain free from falls  7/20/2019 1138 by Prince Noam RN  Outcome: Ongoing  7/20/2019 0119 by Janee Mcguire RN  Outcome: Ongoing  7/20/2019 0119 by Janee Mcguire RN  Outcome: Ongoing  Goal: Absence of physical injury  Description  Absence of physical injury  7/20/2019 0119 by Janee Mcguire RN  Outcome: Ongoing  7/20/2019 0119 by Janee Mcguire RN  Outcome: Ongoing     Problem: Activity:  Goal: Risk for activity intolerance will decrease  Description  Risk for activity intolerance will decrease  7/20/2019 1138 by Prince Noam RN  Outcome: Ongoing  7/20/2019 0119 by Janee Mcguire RN  Outcome: Ongoing  7/20/2019 0119 by Janee Mcguire RN  Outcome: Ongoing     Problem:  Bowel/Gastric:  Goal: Bowel function will improve  Description  Bowel function will improve  7/20/2019 0119 by Janee Mcguire RN  Outcome: Ongoing  7/20/2019 0119 by Jennifer Barnhart Ced Pulido RN  Outcome: Ongoing  Goal: Diagnostic test results will improve  Description  Diagnostic test results will improve  7/20/2019 0119 by Onel Saul RN  Outcome: Ongoing  7/20/2019 0119 by Onel Saul RN  Outcome: Ongoing  Goal: Occurrences of nausea will decrease  Description  Occurrences of nausea will decrease  7/20/2019 0119 by Onel Saul RN  Outcome: Ongoing  7/20/2019 0119 by Onel Saul RN  Outcome: Ongoing  Goal: Occurrences of vomiting will decrease  Description  Occurrences of vomiting will decrease  7/20/2019 0119 by Onel Saul RN  Outcome: Ongoing  7/20/2019 0119 by Onel Saul RN  Outcome: Ongoing     Problem: Fluid Volume:  Goal: Maintenance of adequate hydration will improve  Description  Maintenance of adequate hydration will improve  7/20/2019 1138 by Fredy James RN  Outcome: Ongoing  7/20/2019 0119 by Onel Saul RN  Outcome: Ongoing  7/20/2019 0119 by Onel Saul RN  Outcome: Ongoing     Problem: Health Behavior:  Goal: Ability to state signs and symptoms to report to health care provider will improve  Description  Ability to state signs and symptoms to report to health care provider will improve  7/20/2019 0119 by Onel Saul RN  Outcome: Ongoing  7/20/2019 0119 by Onel Saul RN  Outcome: Ongoing     Problem: Physical Regulation:  Goal: Complications related to the disease process, condition or treatment will be avoided or minimized  Description  Complications related to the disease process, condition or treatment will be avoided or minimized  7/20/2019 0119 by Onel Saul RN  Outcome: Ongoing  7/20/2019 0119 by Onel Saul RN  Outcome: Ongoing  Goal: Ability to maintain clinical measurements within normal limits will improve  Description  Ability to maintain clinical measurements within normal limits will improve  7/20/2019 0119 by Ale Victor

## 2019-07-20 NOTE — PLAN OF CARE
RN  Outcome: Ongoing  Goal: Occurrences of nausea will decrease  Description  Occurrences of nausea will decrease  7/20/2019 0119 by Alferd Dose, RN  Outcome: Ongoing  7/20/2019 0119 by Alferd Dose, RN  Outcome: Ongoing  Goal: Occurrences of vomiting will decrease  Description  Occurrences of vomiting will decrease  7/20/2019 0119 by Alferd Dose, RN  Outcome: Ongoing  7/20/2019 0119 by Alferd Dose, RN  Outcome: Ongoing     Problem: Fluid Volume:  Goal: Maintenance of adequate hydration will improve  Description  Maintenance of adequate hydration will improve  7/20/2019 0119 by Alferd Dose, RN  Outcome: Ongoing  7/20/2019 0119 by Alferd Dose, RN  Outcome: Ongoing     Problem: Health Behavior:  Goal: Ability to state signs and symptoms to report to health care provider will improve  Description  Ability to state signs and symptoms to report to health care provider will improve  7/20/2019 0119 by Alferd Dose, RN  Outcome: Ongoing  7/20/2019 0119 by Alferd Dose, RN  Outcome: Ongoing     Problem: Physical Regulation:  Goal: Complications related to the disease process, condition or treatment will be avoided or minimized  Description  Complications related to the disease process, condition or treatment will be avoided or minimized  7/20/2019 0119 by Alferd Dose, RN  Outcome: Ongoing  7/20/2019 0119 by Alferd Dose, RN  Outcome: Ongoing  Goal: Ability to maintain clinical measurements within normal limits will improve  Description  Ability to maintain clinical measurements within normal limits will improve  7/20/2019 0119 by Alferd Dose, RN  Outcome: Ongoing  7/20/2019 0119 by Alferd Dose, RN  Outcome: Ongoing     Problem: Sensory:  Goal: Pain level will decrease  Description  Pain level will decrease  7/20/2019 0119 by Alferd Dose, RN  Outcome: Ongoing  7/20/2019 0119 by Alferd Dose,

## 2019-07-20 NOTE — PROGRESS NOTES
GENERAL SURGERY INPATIENT PROGRESS NOTE  [facility]    PATIENT: Monika Ortega, 46 y.o., female, MRN: [id number]    Hospital Day:  LOS: 6 days     Monika Ortega is a 46 y.o. female with complex history including gastric sleeve with Dr. Micah Caballero 2019, presenting with hematemesis and bacteremia, concern for mediport infection. Procedures:   19 EGD with Dr. Micah Caballero: no bleeding, mild biliary reflux  19 mediport removal with Dr. Micah Caballero               Subjective:  Chief Complaint: hematemesis, abdominal pain  Pain: 4/10  BM: yes, soft   Diet: Diet NPO Effective Now Exceptions are: Sips with Meds  Activity: as tolerated    Per nursing, she vomited today after being told she could only have PO pain medicine. Small volume emesis with slightly diluted sanguinous appearance, <100ml. C/o upper abdominal soreness. Nonbloody BM today. Objective:    Vitals: /75   Pulse 62   Temp 98 °F (36.7 °C) (Oral)   Resp 18   Ht 5' 4\" (1.626 m)   Wt 261 lb 3.2 oz (118.5 kg)   SpO2 95%   BMI 44.83 kg/m²   Vital Signs (Last 24 Hours)  Temp  Av.1 °F (36.7 °C)  Min: 98 °F (36.7 °C)  Max: 98.2 °F (36.8 °C)  Pulse  Av  Min: 62  Max: 82  BP  Min: 101/75  Max: 113/73  Resp  Av.5  Min: 18  Max: 21  SpO2  Av %  Min: 95 %  Max: 95 %  Wt Readings from Last 3 Encounters:   19 261 lb 3.2 oz (118.5 kg)   07/10/19 266 lb 6.4 oz (120.8 kg)   19 273 lb (123.8 kg)       I/O: No intake/output data recorded.     IV Fluids:   pantoprozole (PROTONIX) infusion Last Rate: 8 mg/hr (19 6241)    lactated ringers Last Rate: 1,000 mL (19 1353)    dextrose 5% and 0.45% NaCl with KCl 20 mEq Last Rate: 150 mL/hr at 19 1245    Scheduled Meds: sodium chloride, 250 mL, Intravenous, Once    sodium chloride flush, 10 mL, Intravenous, 2 times per day    sodium chloride, 250 mL, Intravenous, Once    atenolol, 25 mg, Oral, Daily    calcium-vitamin D, 1 tablet, Oral, BID  

## 2019-07-20 NOTE — PROGRESS NOTES
Attending Progress Note      PCP: Ria Meadows MD    Patient: Maria L Rowell   Gender: female  : 1970   Age: 46 y.o. MRN: [id number]      Date of Admission: 2019    Chief Complaint:   Chief Complaint   Patient presents with    Abdominal Pain           Subjective:  Hematemesis . Rosaline Peaks Pure blood , I witnessed that in the room . .. Abdominal pain . Rosaline Peaks NPO . No fever/chills ,     Medications:  Reviewed  Infusion Medications    pantoprozole (PROTONIX) infusion 8 mg/hr (19 0741)    lactated ringers 1,000 mL (19 1353)    dextrose 5% and 0.45% NaCl with KCl 20 mEq 150 mL/hr at 19 1245     Scheduled Medications    sodium chloride  250 mL Intravenous Once    sodium chloride flush  10 mL Intravenous 2 times per day    sodium chloride  250 mL Intravenous Once    atenolol  25 mg Oral Daily    calcium-vitamin D  1 tablet Oral BID    cloNIDine  0.1 mg Oral BID    vitamin D  5,000 Units Oral Daily    dicyclomine  20 mg Oral Q6H    estradiol  0.5 mg Oral Daily    ferrous sulfate  325 mg Oral BID    gabapentin  800 mg Oral TID    levothyroxine  125 mcg Oral Daily    therapeutic multivitamin-minerals  1 tablet Oral Daily    sodium chloride flush  10 mL Intravenous 2 times per day    sodium chloride (PF)  10 mL Intravenous Daily    cefepime  2 g Intravenous Q8H     PRN Meds: sodium chloride flush, heparin flush, HYDROmorphone, promethazine, HYDROcodone-acetaminophen, sodium chloride flush, acetaminophen, ondansetron    No intake or output data in the 24 hours ending 19 1500    Exam:  /75   Pulse 62   Temp 98 °F (36.7 °C) (Oral)   Resp 18   Ht 5' 4\" (1.626 m)   Wt 261 lb 3.2 oz (118.5 kg)   SpO2 95%   BMI 44.83 kg/m²   General appearance: No distress,   Respiratory:  symmetrical , good air entry , no Rales , No wheezing, or rhonchi,  Cardiovascular: RRR, with normal S1/S2 without murmurs.   Abdomen : Soft, RUQ tender, non-distended  , normal bowel pt/family .         Melinda Enriquez MD

## 2019-07-21 LAB
ANION GAP SERPL CALCULATED.3IONS-SCNC: 9 MMOL/L (ref 4–16)
APTT: 24.5 SECONDS (ref 21.2–33)
APTT: 33.9 SECONDS (ref 21.2–33)
BUN BLDV-MCNC: 8 MG/DL (ref 6–23)
CALCIUM IONIZED: 5.92 MG/DL (ref 4.48–5.28)
CALCIUM IONIZED: 6.24 MG/DL (ref 4.48–5.28)
CALCIUM SERPL-MCNC: 10.9 MG/DL (ref 8.3–10.6)
CHLORIDE BLD-SCNC: 110 MMOL/L (ref 99–110)
CO2: 26 MMOL/L (ref 21–32)
CREAT SERPL-MCNC: 0.6 MG/DL (ref 0.6–1.1)
GFR AFRICAN AMERICAN: >60 ML/MIN/1.73M2
GFR NON-AFRICAN AMERICAN: >60 ML/MIN/1.73M2
GLUCOSE BLD-MCNC: 99 MG/DL (ref 70–99)
HCT VFR BLD CALC: 26.5 % (ref 37–47)
HEMOGLOBIN: 7.6 GM/DL (ref 12.5–16)
INR BLD: 1 INDEX
INR BLD: 1.04 INDEX
IONIZED CA: 1.48 MMOL/L (ref 1.12–1.32)
IONIZED CA: 1.56 MMOL/L (ref 1.12–1.32)
MCH RBC QN AUTO: 23.7 PG (ref 27–31)
MCHC RBC AUTO-ENTMCNC: 28.7 % (ref 32–36)
MCV RBC AUTO: 82.6 FL (ref 78–100)
PDW BLD-RTO: 15.3 % (ref 11.7–14.9)
PLATELET # BLD: 177 K/CU MM (ref 140–440)
PMV BLD AUTO: 11.1 FL (ref 7.5–11.1)
POTASSIUM SERPL-SCNC: 4.5 MMOL/L (ref 3.5–5.1)
PROTHROMBIN TIME: 11.6 SECONDS (ref 9.12–12.5)
PROTHROMBIN TIME: 11.9 SECONDS (ref 9.12–12.5)
RBC # BLD: 3.21 M/CU MM (ref 4.2–5.4)
SODIUM BLD-SCNC: 145 MMOL/L (ref 135–145)
WBC # BLD: 3.7 K/CU MM (ref 4–10.5)

## 2019-07-21 PROCEDURE — 2580000003 HC RX 258: Performed by: SURGERY

## 2019-07-21 PROCEDURE — 6370000000 HC RX 637 (ALT 250 FOR IP): Performed by: SURGERY

## 2019-07-21 PROCEDURE — 99232 SBSQ HOSP IP/OBS MODERATE 35: CPT | Performed by: SURGERY

## 2019-07-21 PROCEDURE — 80048 BASIC METABOLIC PNL TOTAL CA: CPT

## 2019-07-21 PROCEDURE — 2140000000 HC CCU INTERMEDIATE R&B

## 2019-07-21 PROCEDURE — C9113 INJ PANTOPRAZOLE SODIUM, VIA: HCPCS | Performed by: SURGERY

## 2019-07-21 PROCEDURE — 6360000002 HC RX W HCPCS: Performed by: FAMILY MEDICINE

## 2019-07-21 PROCEDURE — 85027 COMPLETE CBC AUTOMATED: CPT

## 2019-07-21 PROCEDURE — 2500000003 HC RX 250 WO HCPCS: Performed by: SURGERY

## 2019-07-21 PROCEDURE — 6360000002 HC RX W HCPCS: Performed by: SURGERY

## 2019-07-21 PROCEDURE — 82330 ASSAY OF CALCIUM: CPT

## 2019-07-21 PROCEDURE — 2500000003 HC RX 250 WO HCPCS: Performed by: FAMILY MEDICINE

## 2019-07-21 PROCEDURE — 85730 THROMBOPLASTIN TIME PARTIAL: CPT

## 2019-07-21 PROCEDURE — 85610 PROTHROMBIN TIME: CPT

## 2019-07-21 PROCEDURE — 94761 N-INVAS EAR/PLS OXIMETRY MLT: CPT

## 2019-07-21 RX ORDER — HYDROMORPHONE HCL 110MG/55ML
0.5 PATIENT CONTROLLED ANALGESIA SYRINGE INTRAVENOUS EVERY 6 HOURS PRN
Status: DISCONTINUED | OUTPATIENT
Start: 2019-07-21 | End: 2019-07-23 | Stop reason: HOSPADM

## 2019-07-21 RX ORDER — HYDROMORPHONE HCL 110MG/55ML
0.5 PATIENT CONTROLLED ANALGESIA SYRINGE INTRAVENOUS EVERY 4 HOURS PRN
Status: DISCONTINUED | OUTPATIENT
Start: 2019-07-21 | End: 2019-07-21

## 2019-07-21 RX ADMIN — CEFEPIME HYDROCHLORIDE 2 G: 2 INJECTION, POWDER, FOR SOLUTION INTRAVENOUS at 05:39

## 2019-07-21 RX ADMIN — FERROUS SULFATE TAB 325 MG (65 MG ELEMENTAL FE) 325 MG: 325 (65 FE) TAB at 20:48

## 2019-07-21 RX ADMIN — PROMETHAZINE HYDROCHLORIDE 12.5 MG: 25 INJECTION, SOLUTION INTRAMUSCULAR; INTRAVENOUS at 12:56

## 2019-07-21 RX ADMIN — GABAPENTIN 800 MG: 400 CAPSULE ORAL at 14:40

## 2019-07-21 RX ADMIN — ONDANSETRON 4 MG: 2 INJECTION INTRAMUSCULAR; INTRAVENOUS at 17:34

## 2019-07-21 RX ADMIN — PROMETHAZINE HYDROCHLORIDE 12.5 MG: 25 INJECTION, SOLUTION INTRAMUSCULAR; INTRAVENOUS at 05:39

## 2019-07-21 RX ADMIN — CEFEPIME HYDROCHLORIDE 2 G: 2 INJECTION, POWDER, FOR SOLUTION INTRAVENOUS at 23:20

## 2019-07-21 RX ADMIN — HYDROCODONE BITARTRATE AND ACETAMINOPHEN 1 TABLET: 7.5; 325 TABLET ORAL at 17:34

## 2019-07-21 RX ADMIN — PROMETHAZINE HYDROCHLORIDE 12.5 MG: 25 INJECTION, SOLUTION INTRAMUSCULAR; INTRAVENOUS at 01:04

## 2019-07-21 RX ADMIN — SODIUM CHLORIDE 8 MG/HR: 9 INJECTION, SOLUTION INTRAVENOUS at 17:31

## 2019-07-21 RX ADMIN — SUCRALFATE 1 G: 1 TABLET ORAL at 17:31

## 2019-07-21 RX ADMIN — HYDROMORPHONE HYDROCHLORIDE 0.5 MG: 2 INJECTION, SOLUTION INTRAMUSCULAR; INTRAVENOUS; SUBCUTANEOUS at 06:26

## 2019-07-21 RX ADMIN — HYDROMORPHONE HYDROCHLORIDE 0.5 MG: 2 INJECTION, SOLUTION INTRAMUSCULAR; INTRAVENOUS; SUBCUTANEOUS at 12:56

## 2019-07-21 RX ADMIN — POTASSIUM CHLORIDE, DEXTROSE MONOHYDRATE AND SODIUM CHLORIDE: 150; 5; 450 INJECTION, SOLUTION INTRAVENOUS at 05:39

## 2019-07-21 RX ADMIN — CLONIDINE HYDROCHLORIDE 0.1 MG: 0.1 TABLET ORAL at 20:49

## 2019-07-21 RX ADMIN — POTASSIUM CHLORIDE, DEXTROSE MONOHYDRATE AND SODIUM CHLORIDE: 150; 5; 450 INJECTION, SOLUTION INTRAVENOUS at 22:01

## 2019-07-21 RX ADMIN — SODIUM CHLORIDE 8 MG/HR: 9 INJECTION, SOLUTION INTRAVENOUS at 06:26

## 2019-07-21 RX ADMIN — DICYCLOMINE HYDROCHLORIDE 20 MG: 20 TABLET ORAL at 17:31

## 2019-07-21 RX ADMIN — HYDROMORPHONE HYDROCHLORIDE 0.5 MG: 2 INJECTION, SOLUTION INTRAMUSCULAR; INTRAVENOUS; SUBCUTANEOUS at 00:08

## 2019-07-21 RX ADMIN — HYDROMORPHONE HYDROCHLORIDE 0.5 MG: 2 INJECTION, SOLUTION INTRAMUSCULAR; INTRAVENOUS; SUBCUTANEOUS at 19:00

## 2019-07-21 RX ADMIN — ONDANSETRON 4 MG: 2 INJECTION INTRAMUSCULAR; INTRAVENOUS at 09:25

## 2019-07-21 RX ADMIN — SUCRALFATE 1 G: 1 TABLET ORAL at 12:57

## 2019-07-21 RX ADMIN — HYDROMORPHONE HYDROCHLORIDE 0.5 MG: 2 INJECTION, SOLUTION INTRAMUSCULAR; INTRAVENOUS; SUBCUTANEOUS at 09:28

## 2019-07-21 RX ADMIN — CEFEPIME HYDROCHLORIDE 2 G: 2 INJECTION, POWDER, FOR SOLUTION INTRAVENOUS at 14:40

## 2019-07-21 RX ADMIN — DICYCLOMINE HYDROCHLORIDE 20 MG: 20 TABLET ORAL at 12:57

## 2019-07-21 RX ADMIN — OYSTER SHELL CALCIUM WITH VITAMIN D 1 TABLET: 500; 200 TABLET, FILM COATED ORAL at 20:47

## 2019-07-21 RX ADMIN — GABAPENTIN 800 MG: 400 CAPSULE ORAL at 20:48

## 2019-07-21 ASSESSMENT — PAIN DESCRIPTION - PROGRESSION

## 2019-07-21 ASSESSMENT — PAIN DESCRIPTION - LOCATION
LOCATION: ABDOMEN
LOCATION: ABDOMEN

## 2019-07-21 ASSESSMENT — PAIN SCALES - GENERAL
PAINLEVEL_OUTOF10: 2
PAINLEVEL_OUTOF10: 7
PAINLEVEL_OUTOF10: 8
PAINLEVEL_OUTOF10: 7
PAINLEVEL_OUTOF10: 6
PAINLEVEL_OUTOF10: 8
PAINLEVEL_OUTOF10: 5

## 2019-07-21 ASSESSMENT — PAIN SCALES - WONG BAKER: WONGBAKER_NUMERICALRESPONSE: 0

## 2019-07-21 ASSESSMENT — PAIN DESCRIPTION - PAIN TYPE: TYPE: ACUTE PAIN

## 2019-07-21 NOTE — PROGRESS NOTES
This writer into room to [facility] AM meds, pt noted with small amount of bloody emesis in waste bucket,  notified, awaiting response

## 2019-07-21 NOTE — PROGRESS NOTES
CHART REVIEWED PT CONTINUES WITH INTERMITTENT HEMATEMESIS  NO GROSS BLOOD PER RECTUM  VITALS STABLE   LABS NOTED HB REMAINS STABLE AT 7.6 WITH BLOOD TRANSFUSION DURING PRESENT HOSPITALIZATION PT HAD MULTIPLE EGDS IN THE PAST  LAST BY DR Ronda Correa IN 06/19  WILL REPEAT EGD IN AM BECAUSE OF RECURRENT HEMATEMEIS

## 2019-07-21 NOTE — PLAN OF CARE
Problem: Fluid Volume:  Goal: Maintenance of adequate hydration will improve  Description  Maintenance of adequate hydration will improve  7/20/2019 2347 by Aaliyah Morrow RN  Outcome: Ongoing     Problem: Health Behavior:  Goal: Ability to state signs and symptoms to report to health care provider will improve  Description  Ability to state signs and symptoms to report to health care provider will improve  7/20/2019 2347 by Aaliyah Morrow RN  Outcome: Ongoing     Problem: Physical Regulation:  Goal: Complications related to the disease process, condition or treatment will be avoided or minimized  Description  Complications related to the disease process, condition or treatment will be avoided or minimized  7/20/2019 2347 by Aaliyah Morrow RN  Outcome: Ongoing  Goal: Ability to maintain clinical measurements within normal limits will improve  Description  Ability to maintain clinical measurements within normal limits will improve  7/20/2019 2347 by Aaliyah Morrow RN  Outcome: Ongoing     Problem: Sensory:  Goal: Pain level will decrease  Description  Pain level will decrease  7/21/2019 1042 by Neftali Pettit RN  Outcome: Ongoing  7/20/2019 2347 by Aaliyah Morrow RN  Outcome: Ongoing  Goal: Ability to identify factors that increase the pain will improve  Description  Ability to identify factors that increase the pain will improve  7/20/2019 2347 by Aaliyah Morrow RN  Outcome: Ongoing  Goal: Ability to notify healthcare provider of pain before it becomes unmanageable or unbearable will improve  Description  Ability to notify healthcare provider of pain before it becomes unmanageable or unbearable will improve  7/20/2019 2347 by Aaliyah Morrow RN  Outcome: Ongoing     Problem: Nutrition  Goal: Optimal nutrition therapy  7/20/2019 2347 by Aaliyah Morrow RN  Outcome: Ongoing

## 2019-07-21 NOTE — PROGRESS NOTES
infection  Resolved Problems:    * No resolved hospital problems. *      Plan:  · Hb rechecks stable. Will monitor for now as she is hemodynamically stable and her hematemesis appeared to be small volume and diluted. · Continue iron for anemia  · PPI drip  · Bariatric clears today  · Continue carafate  · Mediport removal site with possible seroma, will monitor.      Electronically signed: Ed Brantley MD 7/21/2019 2:13 PM

## 2019-07-21 NOTE — PLAN OF CARE
signs and symptoms to report to health care provider will improve  Description  Ability to state signs and symptoms to report to health care provider will improve  Outcome: Ongoing     Problem: Physical Regulation:  Goal: Complications related to the disease process, condition or treatment will be avoided or minimized  Description  Complications related to the disease process, condition or treatment will be avoided or minimized  Outcome: Ongoing  Goal: Ability to maintain clinical measurements within normal limits will improve  Description  Ability to maintain clinical measurements within normal limits will improve  Outcome: Ongoing     Problem: Sensory:  Goal: Pain level will decrease  Description  Pain level will decrease  7/20/2019 2347 by Amy Zhao RN  Outcome: Ongoing  7/20/2019 1138 by David Guillory RN  Outcome: Ongoing  Goal: Ability to identify factors that increase the pain will improve  Description  Ability to identify factors that increase the pain will improve  Outcome: Ongoing  Goal: Ability to notify healthcare provider of pain before it becomes unmanageable or unbearable will improve  Description  Ability to notify healthcare provider of pain before it becomes unmanageable or unbearable will improve  Outcome: Ongoing     Problem: Nutrition  Goal: Optimal nutrition therapy  Outcome: Ongoing

## 2019-07-21 NOTE — PROGRESS NOTES
[facility]        Progress Note       2019  9:46 AM    Patient:    Gabriela Way  : 1970   46 y.o. MRN: [id number]  Admitted: 2019  5:59 AM ATT: Flash Chicas MD   [id number]  AdmitDx: Abdominal pain, right upper quadrant [R10.11]  Upper GI bleed [K92.2]  Hematemesis with nausea [K92.0]  Anemia, unspecified type [D64.9]  PCP: Flash Chicas MD    SUBJECTIVE:  Chart reviewed, events noted  Patient feeling about the same. Green BM last evening. NPO. Hematemesis 0430 today. Receiving PPI infusion. Reports RUQ abdominal pain.  Vitals stable     ROS:  The positive ROS will be identified in bold     CONSTITUTIONAL:  Neg  Weight loss, fatigue, fever  MOUTH/THROAT:  Neg  Bleeding gums, hoarseness or sore throat  RESPIRATORY:   Neg SOB, wheeze, cough, hemoptysis or bronchitis  CARDIOVASCULAR:  Neg Chest pain, palpitations, dyspnea on exertion, edema  GASTROINTESTINAL:  SEE HPI  HEMATOLOGIC/LYMPHATIC: + Anemia, bleeding tendency  MUSCULOSKELETAL: Neg  New myalgias, joint pain, swelling or stiffness  NEUROLOGICAL:  Neg  Loss of Consciousness, memory loss, forgetfulness, periods of confusion, difficulty concentrating, seizures, insomnia, aphasia    SKIN:  Neg No itching, rashes, or sores  PSYCHIATRIC:  Neg Depression, personality changes, anxiety    OBJECTIVE:      BP (!) 139/55   Pulse 59   Temp 98.3 °F (36.8 °C) (Oral)   Resp 15   Ht 5' 4\" (1.626 m)   Wt 261 lb 3.2 oz (118.5 kg)   SpO2 97%   BMI 44.83 kg/m²     NAD, appears comfortable, sitting up in bed   Lips and mucous membranes pink and moist  RRR, Nl s1s2, no murmurs   Lungs CTA bilaterally, respirations even and unlabored   Abdomen soft, ND, RUQ tenderness, bowel sounds normal  Skin pink, warm and dry  No edema bilateral lower extremities   AAOx3     CBC:   Recent Labs     19  0546 19  1512 19  0525   WBC  --  3.4* 3.7*   HGB 7.3* 7.9* 7.6*   PLT  --  167 177     BMP:    Recent Labs Generalized abdominal pain R10.84    Pneumonia of both lungs due to infectious organism J18.9    Foot ulcer, left ([facility]) L97.529    SLE (systemic lupus erythematosus related syndrome) ([facility]) M32.9    Hypoxia R09.02    Cellulitis L03.90    Pancytopenia ([facility]) D61.818    Pleurisy R09.1    Frequent UTI N39.0    Gastroesophageal reflux disease without esophagitis K21.9    MRSA cellulitis of left foot L03.116, B95.62    Depression F32.9    Fatty liver K76.0    Fatty liver disease, nonalcoholic N31.6    Arthritis M19.90    Bilateral low back pain with left-sided sciatica M54.42    Morbid obesity with BMI of 50.0-59.9, adult ([facility]) E66.01, Z68.43    Intractable vomiting with nausea R11.2    Class 3 obesity in adult [id number]    Hiatal hernia K44.9    Poor intravenous access Z78.9    Post-operative nausea and vomiting R11.2, Z98.890    Status post bariatric surgery Z98.84    Status post laparoscopic sleeve gastrectomy Z98.84    Abscess L02.91    Dysphagia R13.10    Pseudoseizure F44.5    Chronic pain syndrome G89.4    Drug-seeking/Aberrant behavior Z76.5    Acute conjunctivitis H10.30    Chronic osteomyelitis of left foot with draining sinus ([facility]) M86.472    Receiving intravenous antibiotic treatment as outpatient Z79.2    History of bacteremia Z87.898    Abdominal pain, right upper quadrant R10.11    Sepsis due to Pseudomonas species ([address] A41.52    Bacteremia due to Pseudomonas R78.81    Catheter-related bloodstream infection T80.211A     Recommendations:    Hematemesis:  One episode overnight  Hgb 7.6 today, hemodynamically stable   Receiving PPI infusion   Continue Phenergan and Zofran   Keep narcotics to minimum   NPO after MN, Dr. Paris Yee will see in am   Clear liquid diet today     Discussed plan of care with patient     Patient clinical, biochemical, and radiological information discussed with Dr. Jeremy Cortes. He agrees with the assessment and plan.      Manasa Sanon, CNP  7/21/2019

## 2019-07-22 ENCOUNTER — ANESTHESIA EVENT (OUTPATIENT)
Dept: ENDOSCOPY | Age: 49
DRG: 270 | End: 2019-07-22
Payer: COMMERCIAL

## 2019-07-22 ENCOUNTER — ANESTHESIA (OUTPATIENT)
Dept: ENDOSCOPY | Age: 49
DRG: 270 | End: 2019-07-22
Payer: COMMERCIAL

## 2019-07-22 VITALS — TEMPERATURE: 98.6 F | DIASTOLIC BLOOD PRESSURE: 59 MMHG | OXYGEN SATURATION: 100 % | SYSTOLIC BLOOD PRESSURE: 103 MMHG

## 2019-07-22 LAB
ALBUMIN SERPL-MCNC: 3.6 GM/DL (ref 3.4–5)
ALP BLD-CCNC: 106 IU/L (ref 40–129)
ALT SERPL-CCNC: 13 U/L (ref 10–40)
ANION GAP SERPL CALCULATED.3IONS-SCNC: 7 MMOL/L (ref 4–16)
AST SERPL-CCNC: 22 IU/L (ref 15–37)
BILIRUB SERPL-MCNC: 0.3 MG/DL (ref 0–1)
BUN BLDV-MCNC: 5 MG/DL (ref 6–23)
CALCIUM SERPL-MCNC: 10.7 MG/DL (ref 8.3–10.6)
CHLORIDE BLD-SCNC: 105 MMOL/L (ref 99–110)
CO2: 27 MMOL/L (ref 21–32)
CREAT SERPL-MCNC: 0.6 MG/DL (ref 0.6–1.1)
GFR AFRICAN AMERICAN: >60 ML/MIN/1.73M2
GFR NON-AFRICAN AMERICAN: >60 ML/MIN/1.73M2
GLUCOSE BLD-MCNC: 104 MG/DL (ref 70–99)
HCT VFR BLD CALC: 25.2 % (ref 37–47)
HEMOGLOBIN: 7.4 GM/DL (ref 12.5–16)
MCH RBC QN AUTO: 24.2 PG (ref 27–31)
MCHC RBC AUTO-ENTMCNC: 29.4 % (ref 32–36)
MCV RBC AUTO: 82.4 FL (ref 78–100)
PDW BLD-RTO: 15.5 % (ref 11.7–14.9)
PLATELET # BLD: 201 K/CU MM (ref 140–440)
PMV BLD AUTO: 10.7 FL (ref 7.5–11.1)
POTASSIUM SERPL-SCNC: 4.3 MMOL/L (ref 3.5–5.1)
RBC # BLD: 3.06 M/CU MM (ref 4.2–5.4)
SODIUM BLD-SCNC: 139 MMOL/L (ref 135–145)
TOTAL PROTEIN: 5.7 GM/DL (ref 6.4–8.2)
WBC # BLD: 3.5 K/CU MM (ref 4–10.5)

## 2019-07-22 PROCEDURE — 6370000000 HC RX 637 (ALT 250 FOR IP): Performed by: SPECIALIST

## 2019-07-22 PROCEDURE — 6360000002 HC RX W HCPCS: Performed by: SURGERY

## 2019-07-22 PROCEDURE — 02HV33Z INSERTION OF INFUSION DEVICE INTO SUPERIOR VENA CAVA, PERCUTANEOUS APPROACH: ICD-10-PCS | Performed by: FAMILY MEDICINE

## 2019-07-22 PROCEDURE — 2709999900 HC NON-CHARGEABLE SUPPLY: Performed by: SPECIALIST

## 2019-07-22 PROCEDURE — 85027 COMPLETE CBC AUTOMATED: CPT

## 2019-07-22 PROCEDURE — 2500000003 HC RX 250 WO HCPCS: Performed by: FAMILY MEDICINE

## 2019-07-22 PROCEDURE — 2140000000 HC CCU INTERMEDIATE R&B

## 2019-07-22 PROCEDURE — 6370000000 HC RX 637 (ALT 250 FOR IP): Performed by: FAMILY MEDICINE

## 2019-07-22 PROCEDURE — 6370000000 HC RX 637 (ALT 250 FOR IP): Performed by: SURGERY

## 2019-07-22 PROCEDURE — 6360000002 HC RX W HCPCS: Performed by: NURSE ANESTHETIST, CERTIFIED REGISTERED

## 2019-07-22 PROCEDURE — 2580000003 HC RX 258: Performed by: SURGERY

## 2019-07-22 PROCEDURE — 94761 N-INVAS EAR/PLS OXIMETRY MLT: CPT

## 2019-07-22 PROCEDURE — C9113 INJ PANTOPRAZOLE SODIUM, VIA: HCPCS | Performed by: SURGERY

## 2019-07-22 PROCEDURE — 99222 1ST HOSP IP/OBS MODERATE 55: CPT | Performed by: SURGERY

## 2019-07-22 PROCEDURE — 2500000003 HC RX 250 WO HCPCS: Performed by: NURSE ANESTHETIST, CERTIFIED REGISTERED

## 2019-07-22 PROCEDURE — 2580000003 HC RX 258: Performed by: ANESTHESIOLOGY

## 2019-07-22 PROCEDURE — 0DJ08ZZ INSPECTION OF UPPER INTESTINAL TRACT, VIA NATURAL OR ARTIFICIAL OPENING ENDOSCOPIC: ICD-10-PCS | Performed by: SPECIALIST

## 2019-07-22 PROCEDURE — 6360000002 HC RX W HCPCS: Performed by: FAMILY MEDICINE

## 2019-07-22 PROCEDURE — 3700000000 HC ANESTHESIA ATTENDED CARE: Performed by: SPECIALIST

## 2019-07-22 PROCEDURE — 3700000001 HC ADD 15 MINUTES (ANESTHESIA): Performed by: SPECIALIST

## 2019-07-22 PROCEDURE — 6360000002 HC RX W HCPCS: Performed by: SPECIALIST

## 2019-07-22 PROCEDURE — 3609012800 HC EGD DIAGNOSTIC ONLY: Performed by: SPECIALIST

## 2019-07-22 PROCEDURE — 80053 COMPREHEN METABOLIC PANEL: CPT

## 2019-07-22 PROCEDURE — C9113 INJ PANTOPRAZOLE SODIUM, VIA: HCPCS | Performed by: SPECIALIST

## 2019-07-22 RX ORDER — SODIUM CHLORIDE, SODIUM LACTATE, POTASSIUM CHLORIDE, CALCIUM CHLORIDE 600; 310; 30; 20 MG/100ML; MG/100ML; MG/100ML; MG/100ML
INJECTION, SOLUTION INTRAVENOUS ONCE
Status: COMPLETED | OUTPATIENT
Start: 2019-07-22 | End: 2019-07-22

## 2019-07-22 RX ORDER — PROPOFOL 10 MG/ML
INJECTION, EMULSION INTRAVENOUS PRN
Status: DISCONTINUED | OUTPATIENT
Start: 2019-07-22 | End: 2019-07-22 | Stop reason: SDUPTHER

## 2019-07-22 RX ORDER — LIDOCAINE HYDROCHLORIDE 20 MG/ML
INJECTION, SOLUTION EPIDURAL; INFILTRATION; INTRACAUDAL; PERINEURAL PRN
Status: DISCONTINUED | OUTPATIENT
Start: 2019-07-22 | End: 2019-07-22 | Stop reason: SDUPTHER

## 2019-07-22 RX ORDER — PANTOPRAZOLE SODIUM 40 MG/10ML
40 INJECTION, POWDER, LYOPHILIZED, FOR SOLUTION INTRAVENOUS 2 TIMES DAILY
Status: DISCONTINUED | OUTPATIENT
Start: 2019-07-22 | End: 2019-07-23 | Stop reason: HOSPADM

## 2019-07-22 RX ORDER — SUCRALFATE 1 G/1
1 TABLET ORAL EVERY 6 HOURS SCHEDULED
Status: DISCONTINUED | OUTPATIENT
Start: 2019-07-22 | End: 2019-07-23 | Stop reason: HOSPADM

## 2019-07-22 RX ORDER — LORAZEPAM 0.5 MG/1
0.5 TABLET ORAL EVERY 12 HOURS PRN
Status: DISCONTINUED | OUTPATIENT
Start: 2019-07-22 | End: 2019-07-23 | Stop reason: HOSPADM

## 2019-07-22 RX ORDER — SUCRALFATE ORAL 1 G/10ML
1 SUSPENSION ORAL EVERY 6 HOURS SCHEDULED
Status: DISCONTINUED | OUTPATIENT
Start: 2019-07-22 | End: 2019-07-22 | Stop reason: CLARIF

## 2019-07-22 RX ADMIN — DICYCLOMINE HYDROCHLORIDE 20 MG: 20 TABLET ORAL at 16:43

## 2019-07-22 RX ADMIN — Medication 10 ML: at 12:38

## 2019-07-22 RX ADMIN — HYDROCODONE BITARTRATE AND ACETAMINOPHEN 1 TABLET: 7.5; 325 TABLET ORAL at 16:44

## 2019-07-22 RX ADMIN — LIDOCAINE HYDROCHLORIDE 50 MG: 20 INJECTION, SOLUTION EPIDURAL; INFILTRATION; INTRACAUDAL; PERINEURAL at 11:12

## 2019-07-22 RX ADMIN — DICYCLOMINE HYDROCHLORIDE 20 MG: 20 TABLET ORAL at 12:40

## 2019-07-22 RX ADMIN — SUCRALFATE 1 G: 1 TABLET ORAL at 12:32

## 2019-07-22 RX ADMIN — SODIUM CHLORIDE, PRESERVATIVE FREE 10 ML: 5 INJECTION INTRAVENOUS at 21:32

## 2019-07-22 RX ADMIN — LORAZEPAM 0.5 MG: 0.5 TABLET ORAL at 12:32

## 2019-07-22 RX ADMIN — GABAPENTIN 800 MG: 400 CAPSULE ORAL at 12:31

## 2019-07-22 RX ADMIN — PROPOFOL 70 MG: 10 INJECTION, EMULSION INTRAVENOUS at 11:12

## 2019-07-22 RX ADMIN — SUCRALFATE 1 G: 1 TABLET ORAL at 16:44

## 2019-07-22 RX ADMIN — Medication 5000 UNITS: at 12:31

## 2019-07-22 RX ADMIN — FERROUS SULFATE TAB 325 MG (65 MG ELEMENTAL FE) 325 MG: 325 (65 FE) TAB at 12:31

## 2019-07-22 RX ADMIN — PROMETHAZINE HYDROCHLORIDE 12.5 MG: 25 INJECTION, SOLUTION INTRAMUSCULAR; INTRAVENOUS at 00:10

## 2019-07-22 RX ADMIN — HYDROMORPHONE HYDROCHLORIDE 0.5 MG: 2 INJECTION, SOLUTION INTRAMUSCULAR; INTRAVENOUS; SUBCUTANEOUS at 18:18

## 2019-07-22 RX ADMIN — HYDROMORPHONE HYDROCHLORIDE 0.5 MG: 2 INJECTION, SOLUTION INTRAMUSCULAR; INTRAVENOUS; SUBCUTANEOUS at 11:58

## 2019-07-22 RX ADMIN — LEVOTHYROXINE SODIUM 125 MCG: 125 TABLET ORAL at 10:02

## 2019-07-22 RX ADMIN — PROMETHAZINE HYDROCHLORIDE 12.5 MG: 25 INJECTION, SOLUTION INTRAMUSCULAR; INTRAVENOUS at 11:58

## 2019-07-22 RX ADMIN — ESTRADIOL 0.5 MG: 1 TABLET ORAL at 12:38

## 2019-07-22 RX ADMIN — PROMETHAZINE HYDROCHLORIDE 12.5 MG: 25 INJECTION, SOLUTION INTRAMUSCULAR; INTRAVENOUS at 18:19

## 2019-07-22 RX ADMIN — LIDOCAINE HYDROCHLORIDE 20 MG: 20 INJECTION, SOLUTION EPIDURAL; INFILTRATION; INTRACAUDAL; PERINEURAL at 11:25

## 2019-07-22 RX ADMIN — POTASSIUM CHLORIDE, DEXTROSE MONOHYDRATE AND SODIUM CHLORIDE: 150; 5; 450 INJECTION, SOLUTION INTRAVENOUS at 21:32

## 2019-07-22 RX ADMIN — DICYCLOMINE HYDROCHLORIDE 20 MG: 20 TABLET ORAL at 05:03

## 2019-07-22 RX ADMIN — SODIUM CHLORIDE, POTASSIUM CHLORIDE, SODIUM LACTATE AND CALCIUM CHLORIDE: 600; 310; 30; 20 INJECTION, SOLUTION INTRAVENOUS at 10:46

## 2019-07-22 RX ADMIN — HYDROMORPHONE HYDROCHLORIDE 0.5 MG: 2 INJECTION, SOLUTION INTRAMUSCULAR; INTRAVENOUS; SUBCUTANEOUS at 01:00

## 2019-07-22 RX ADMIN — FERROUS SULFATE TAB 325 MG (65 MG ELEMENTAL FE) 325 MG: 325 (65 FE) TAB at 21:32

## 2019-07-22 RX ADMIN — LIDOCAINE HYDROCHLORIDE 30 MG: 20 INJECTION, SOLUTION EPIDURAL; INFILTRATION; INTRACAUDAL; PERINEURAL at 11:18

## 2019-07-22 RX ADMIN — GABAPENTIN 800 MG: 400 CAPSULE ORAL at 21:31

## 2019-07-22 RX ADMIN — PROPOFOL 20 MG: 10 INJECTION, EMULSION INTRAVENOUS at 11:14

## 2019-07-22 RX ADMIN — SODIUM CHLORIDE 8 MG/HR: 9 INJECTION, SOLUTION INTRAVENOUS at 05:47

## 2019-07-22 RX ADMIN — CEFEPIME HYDROCHLORIDE 2 G: 2 INJECTION, POWDER, FOR SOLUTION INTRAVENOUS at 05:13

## 2019-07-22 RX ADMIN — HYDROCODONE BITARTRATE AND ACETAMINOPHEN 1 TABLET: 7.5; 325 TABLET ORAL at 05:03

## 2019-07-22 RX ADMIN — SODIUM CHLORIDE, POTASSIUM CHLORIDE, SODIUM LACTATE AND CALCIUM CHLORIDE: 600; 310; 30; 20 INJECTION, SOLUTION INTRAVENOUS at 11:07

## 2019-07-22 RX ADMIN — ATENOLOL 25 MG: 25 TABLET ORAL at 12:37

## 2019-07-22 RX ADMIN — HYDROCODONE BITARTRATE AND ACETAMINOPHEN 1 TABLET: 7.5; 325 TABLET ORAL at 22:57

## 2019-07-22 RX ADMIN — ONDANSETRON 4 MG: 2 INJECTION INTRAMUSCULAR; INTRAVENOUS at 16:44

## 2019-07-22 RX ADMIN — PROPOFOL 20 MG: 10 INJECTION, EMULSION INTRAVENOUS at 11:19

## 2019-07-22 RX ADMIN — OYSTER SHELL CALCIUM WITH VITAMIN D 1 TABLET: 500; 200 TABLET, FILM COATED ORAL at 12:37

## 2019-07-22 RX ADMIN — SUCRALFATE 1 G: 1 TABLET ORAL at 05:04

## 2019-07-22 RX ADMIN — CEFEPIME HYDROCHLORIDE 2 G: 2 INJECTION, POWDER, FOR SOLUTION INTRAVENOUS at 21:32

## 2019-07-22 RX ADMIN — LIDOCAINE HYDROCHLORIDE 50 MG: 20 INJECTION, SOLUTION EPIDURAL; INFILTRATION; INTRACAUDAL; PERINEURAL at 11:14

## 2019-07-22 RX ADMIN — CEFEPIME HYDROCHLORIDE 2 G: 2 INJECTION, POWDER, FOR SOLUTION INTRAVENOUS at 15:22

## 2019-07-22 RX ADMIN — PANTOPRAZOLE SODIUM 40 MG: 40 INJECTION, POWDER, FOR SOLUTION INTRAVENOUS at 21:31

## 2019-07-22 RX ADMIN — OYSTER SHELL CALCIUM WITH VITAMIN D 1 TABLET: 500; 200 TABLET, FILM COATED ORAL at 21:32

## 2019-07-22 RX ADMIN — PROMETHAZINE HYDROCHLORIDE 12.5 MG: 25 INJECTION, SOLUTION INTRAMUSCULAR; INTRAVENOUS at 06:56

## 2019-07-22 RX ADMIN — MULTIPLE VITAMINS W/ MINERALS TAB 1 TABLET: TAB at 12:31

## 2019-07-22 RX ADMIN — POTASSIUM CHLORIDE, DEXTROSE MONOHYDRATE AND SODIUM CHLORIDE: 150; 5; 450 INJECTION, SOLUTION INTRAVENOUS at 09:49

## 2019-07-22 RX ADMIN — HYDROMORPHONE HYDROCHLORIDE 0.5 MG: 2 INJECTION, SOLUTION INTRAMUSCULAR; INTRAVENOUS; SUBCUTANEOUS at 06:59

## 2019-07-22 RX ADMIN — CLONIDINE HYDROCHLORIDE 0.1 MG: 0.1 TABLET ORAL at 21:32

## 2019-07-22 RX ADMIN — CLONIDINE HYDROCHLORIDE 0.1 MG: 0.1 TABLET ORAL at 12:31

## 2019-07-22 ASSESSMENT — PAIN SCALES - WONG BAKER
WONGBAKER_NUMERICALRESPONSE: 0

## 2019-07-22 ASSESSMENT — PAIN SCALES - GENERAL
PAINLEVEL_OUTOF10: 7
PAINLEVEL_OUTOF10: 6
PAINLEVEL_OUTOF10: 7
PAINLEVEL_OUTOF10: 1
PAINLEVEL_OUTOF10: 7
PAINLEVEL_OUTOF10: 10
PAINLEVEL_OUTOF10: 6
PAINLEVEL_OUTOF10: 3
PAINLEVEL_OUTOF10: 7

## 2019-07-22 ASSESSMENT — ENCOUNTER SYMPTOMS
SHORTNESS OF BREATH: 1
DYSPNEA ACTIVITY LEVEL: AFTER AMBULATING 1 FLIGHT OF STAIRS

## 2019-07-22 ASSESSMENT — COPD QUESTIONNAIRES: CAT_SEVERITY: MILD

## 2019-07-22 ASSESSMENT — PAIN DESCRIPTION - LOCATION: LOCATION: ABDOMEN

## 2019-07-22 NOTE — OP NOTE
MEDIPORT REMOVAL OPERATIVE PROCEDURE NOTE    Andre Pope, 1970, 46 y.o.,  female, CSN: [id number]  July 16, 2019    PRE-OP DIAGNOSIS: Infected Mediport, and positive blood cultures. POST-OP DIAGNOSIS: Same with purulent discharge at the port site - Cultures pending. PROCEDURE: REMOVAL of her 6.6-English, low profile Mediport from the left  subclavian vein approach. SURGEON(S):   Amanda Taylor M.D., F.A.C.S., F.I.C.S. ASSISTANT(S):  NONE    ANESTHESIA: MAC + Local 1% Xylocaine with Epinephrine, 0.5% Marcaine, mixed, 50% : 50%    SPECIMENS: NONE.    ESTIMATED BLOOD LOSS:  Less then 5 ml           DRAIN: NONE    COMPLICATIONS: NONE           CONDITION / DISPOSITION:  Stable, to PACU     OPERATIVE DESCRIPTION:      After proper informed consent was signed by the  patient with all the risks, benefits, potential complications, and possible  alternatives of the procedure, including but not limited to bleeding,  infection, amongst  others, the patient was properly identified. In the holding area, she received  2 grams of Ancef IV. TEDs and SCDs were placed on her legs and activated  bilaterally. she voided her urinary bladder prior to OR, and Hibiclens skin  prep was performed of the upper chest and lower neck bilaterally. she was  taken to the operating room and was placed supine on the operating room  table, and after institution of general IV sedation. The patient was placed in very mild   Trendelenburg position. The bilateral upper chest and neck all the way to  the chin were prepped and draped in the usual sterile fashion.      An elliptical incision along and around the scar on top of the port was made, dissection was carried down to the pseudocapsule, the ellipse of skin was excised, the pseudocapsule was entered and the 3 2-0 Prolene ties were excised, the the port was pulled back slowly after placing pressure for 12 minutes at the mid subclavian infraclavicular catheter entry
OP REPORT DICTATED
Protonix 40 mg q.12 h.  2.  We will continue patient on Carafate suspension also 1 gm q.6 h.  3.  Monitor the patient's H and H and transfusion on a p.r.n. basis to  keep hemoglobin above 7 gm/dL. 4.  We will start the patient on a clear liquid diet and advance as  tolerated. The patient tolerated procedure well. There were no postop  complications.         Key Lala MD    D: 07/22/2019 11:34:54       T: 07/22/2019 12:22:20     [id number]  Job#: [id number]     Doc#: [id number]    CC:  MD Leann Sandoval MD

## 2019-07-22 NOTE — PROGRESS NOTES
0.5 mg, Oral, Daily    ferrous sulfate, 325 mg, Oral, BID    gabapentin, 800 mg, Oral, TID    levothyroxine, 125 mcg, Oral, Daily    therapeutic multivitamin-minerals, 1 tablet, Oral, Daily    [DISCONTINUED] pantoprazole, 40 mg, Intravenous, Daily **AND** sodium chloride (PF), 10 mL, Intravenous, Daily    cefepime, 2 g, Intravenous, Q8H    Physical Exam:  General Appearance:   Alert, cooperative, no distress    Head:   Normocephalic, atraumatic    Lungs:    Equal chest rise, respirations unlabored   Heart:   Regular rate and rhythm   Mediport removal site with softness, possible seroma, but no induration or drainage   Abdomen:    Soft, minimal/distractible tenderness at upper abdomen, no rebound or guarding. Well healed surgical scars.     Extremities:  No cyanosis or edema    Neurologic:  Nonfocal, grossly intact        Labs/Imaging Results:   Recent Results (from the past 24 hour(s))   PT    Collection Time: 07/21/19  7:10 PM   Result Value Ref Range    Protime 11.9 9.12 - 12.5 SECONDS    INR 1.04 INDEX   PTT    Collection Time: 07/21/19  7:10 PM   Result Value Ref Range    aPTT 33.9 (H) 21.2 - 33.0 SECONDS   Ionized Calcium    Collection Time: 07/21/19  7:10 PM   Result Value Ref Range    Ionized Ca 1.48 (H) 1.12 - 1.32 mMOL/L    Calcium, Ion 5.92 (H) 4.48 - 5.28 MG/DL   CBC    Collection Time: 07/22/19  5:00 AM   Result Value Ref Range    WBC 3.5 (L) 4.0 - 10.5 K/CU MM    RBC 3.06 (L) 4.2 - 5.4 M/CU MM    Hemoglobin 7.4 (L) 12.5 - 16.0 GM/DL    Hematocrit 25.2 (L) 37 - 47 %    MCV 82.4 78 - 100 FL    MCH 24.2 (L) 27 - 31 PG    MCHC 29.4 (L) 32.0 - 36.0 %    RDW 15.5 (H) 11.7 - 14.9 %    Platelets 646 647 - 722 K/CU MM    MPV 10.7 7.5 - 11.1 FL   Comprehensive Metabolic Panel    Collection Time: 07/22/19  5:00 AM   Result Value Ref Range    Sodium 139 135 - 145 MMOL/L    Potassium 4.3 3.5 - 5.1 MMOL/L    Chloride 105 99 - 110 mMol/L    CO2 27 21 - 32 MMOL/L    BUN 5 (L) 6 - 23 MG/DL    CREATININE 0.6 0.6 -

## 2019-07-22 NOTE — ANESTHESIA POSTPROCEDURE EVALUATION
Department of Anesthesiology  Postprocedure Note    Patient: Gabriela Way  MRN: [id number]  YOB: 1970  Date of evaluation: 7/22/2019  Time:  11:43 AM     Procedure Summary     Date:  07/22/19 Room / Location:  [facility] ENDO 01 / [facility] ENDOSCOPY    Anesthesia Start:  1107 Anesthesia Stop:  2801    Procedure:  EGD DIAGNOSTIC ONLY (N/A ) Diagnosis:  (---)    Surgeon:  Ulises Mejia MD Responsible Provider:  Inna Ziegler MD    Anesthesia Type:  general, MAC ASA Status:  3          Anesthesia Type: No value filed. Anay Phase I:      Anay Phase II:      Last vitals: Reviewed and per EMR flowsheets.        Anesthesia Post Evaluation    Patient location during evaluation: PACU  Patient participation: complete - patient participated  Level of consciousness: awake  Airway patency: patent  Nausea & Vomiting: no nausea and no vomiting  Complications: no  Cardiovascular status: blood pressure returned to baseline  Respiratory status: acceptable

## 2019-07-22 NOTE — ANESTHESIA PRE PROCEDURE
Department of Anesthesiology  Preprocedure Note       Name:  Lyle Montgomery   Age:  46 y.o.  :  1970                                          MRN:  [id number]         Date:  2019      Surgeon: Celso Cockayne):  Trina Kim MD    Procedure: EGD ESOPHAGOGASTRODUODENOSCOPY (N/A )    Medications prior to admission:   Prior to Admission medications    Medication Sig Start Date End Date Taking? Authorizing Provider   omeprazole (PRILOSEC) 40 MG delayed release capsule Take 40 mg by mouth daily   Yes Historical Provider, MD   Cholecalciferol (VITAMIN D3) 5000 units TABS Take 1 tablet by mouth daily   Yes Historical Provider, MD   ondansetron (ZOFRAN ODT) 4 MG disintegrating tablet Take 1 tablet by mouth every 8 hours as needed for Nausea or Vomiting 19  Yes Joey Lopez PA-C   ceFAZolin (ANCEF) 2-4 GM/100ML-% SOLN IVPB (premix) Infuse 100 mLs intravenously every 8 hours 19 Yes Gabriela Rome MD   ferrous sulfate (FE TABS) 325 (65 Fe) MG EC tablet Take 1 tablet by mouth 2 times daily 19  Yes Bayron Sanchez MD   tiZANidine (ZANAFLEX) 4 MG tablet Take 4 mg by mouth 2 times daily   Yes Historical Provider, MD   promethazine (PHENERGAN) 25 MG tablet Take 1 tablet by mouth every 6 hours as needed for Nausea 19  Yes Bayron Sanchez MD   pantoprazole (PROTONIX) 20 MG tablet Take 2 tablets by mouth 2 times daily 19  Yes Soy Yan MD   HYDROcodone-acetaminophen ([facility]) 7.5-325 MG per tablet Take 1 tablet by mouth every 6 hours as needed for Pain.     Yes Historical Provider, MD   estradiol (ESTRACE) 0.5 MG tablet Take 0.5 mg by mouth daily   Yes Historical Provider, MD   dicyclomine (BENTYL) 20 MG tablet Take 20 mg by mouth every 6 hours   Yes Historical Provider, MD   atenolol (TENORMIN) 25 MG tablet Take 25 mg by mouth daily   Yes Historical Provider, MD   cloNIDine (CATAPRES) 0.1 MG tablet Take 0.1 mg by mouth 2 times daily   Yes Historical Provider, MD   Multiple Vitamin 07/22/19 0656    0.9 % sodium chloride bolus  250 mL Intravenous Once Marlen Cervantes MD   Stopped at 07/14/19 1018    atenolol (TENORMIN) tablet 25 mg  25 mg Oral Daily Marlen Cervantes MD   Stopped at 07/21/19 0928    calcium-vitamin D (OSCAL-500) 500-200 MG-UNIT per tablet 1 tablet  1 tablet Oral BID Marlen Cervantes MD   1 tablet at 07/21/19 2047    cloNIDine (CATAPRES) tablet 0.1 mg  0.1 mg Oral BID Marlen Cervantes MD   0.1 mg at 07/21/19 2049    vitamin D CAPS 5,000 Units  5,000 Units Oral Daily Marlen Cervantes MD   Stopped at 07/21/19 0931    dicyclomine (BENTYL) tablet 20 mg  20 mg Oral Q6H Marlen Cervantes MD   20 mg at 07/22/19 0503    estradiol (ESTRACE) tablet 0.5 mg  0.5 mg Oral Daily Marlen Cervantes MD   Stopped at 07/21/19 4695    ferrous sulfate tablet 325 mg  325 mg Oral BID Marlen Cervantes MD   325 mg at 07/21/19 2048    gabapentin (NEURONTIN) capsule 800 mg  800 mg Oral TID Marlen Cervantes MD   800 mg at 07/21/19 2048    HYDROcodone-acetaminophen ([address]) 7.5-325 MG per tablet 1 tablet  1 tablet Oral Q6H PRN Marlen Cervantes MD   1 tablet at 07/22/19 0503    levothyroxine (SYNTHROID) tablet 125 mcg  125 mcg Oral Daily Marlen Cervantes MD   125 mcg at 07/22/19 1002    therapeutic multivitamin-minerals 1 tablet  1 tablet Oral Daily Marlen Cervantes MD   Stopped at 07/21/19 0931    sodium chloride flush 0.9 % injection 10 mL  10 mL Intravenous PRN Marlen Cervantes MD   10 mL at 07/15/19 2357    acetaminophen (TYLENOL) tablet 650 mg  650 mg Oral Q4H PRN Marlen Cervantes MD   650 mg at 07/14/19 1709    ondansetron (ZOFRAN) injection 4 mg  4 mg Intravenous Q6H PRN Marlen Cervantes MD   4 mg at 07/21/19 1734    dextrose 5 % and 0.45 % NaCl with KCl 20 mEq infusion   Intravenous Continuous [place] Cotto  mL/hr at 07/22/19 0949      sodium chloride (PF) 0.9 % injection 10 mL  10 mL Intravenous Daily Marlen Cervantes MD   Stopped at 07/21/19 0930    cefepime (MAXIPIME) 2 g in dextrose 5 % 50 mL IVPB  2 g Intravenous Aric Monaco MD   Stopped at 07/22/19 0546       Allergies:     Allergies   Allergen Reactions    Aspirin Palpitations     \"My Heart Rate Elevates\"    Compazine [Prochlorperazine Maleate] Rash    Reglan [Metoclopramide Hcl] Rash    Shellfish-Derived Products Swelling    Toradol [Ketorolac Tromethamine] Rash       Problem List:    Patient Active Problem List   Diagnosis Code    Rectal bleeding K62.5    Fever R50.9    Upper GI bleed K92.2    Fibromyalgia M79.7    Lupus (systemic lupus erythematosus) ([facility]) M32.9    Nausea & vomiting R11.2    Chest pain R07.9    Chronic pancreatitis ([facility]) K86.1    HTN (hypertension) I10    Acute pancreatitis K85.90    Right-sided chest pain R07.9    Super obesity E66.9    Anemia D64.9    Hypokalemia E87.6    Generalized abdominal pain R10.84    Pneumonia of both lungs due to infectious organism J18.9    Foot ulcer, left ([facility]) L97.529    SLE (systemic lupus erythematosus related syndrome) ([facility]) M32.9    Hypoxia R09.02    Cellulitis L03.90    Pancytopenia ([facility]) D61.818    Pleurisy R09.1    Frequent UTI N39.0    Gastroesophageal reflux disease without esophagitis K21.9    MRSA cellulitis of left foot L03.116, B95.62    Depression F32.9    Fatty liver K76.0    Fatty liver disease, nonalcoholic L42.5    Arthritis M19.90    Bilateral low back pain with left-sided sciatica M54.42    Morbid obesity with BMI of 50.0-59.9, adult ([facility]) E66.01, Z68.43    Intractable vomiting with nausea R11.2    Class 3 obesity in adult [id number]    Hiatal hernia K44.9    Poor intravenous access Z78.9    Post-operative nausea and vomiting R11.2, Z98.890    Status post bariatric surgery Z98.84    Status post laparoscopic sleeve gastrectomy Z98.84    Abscess L02.91    Dysphagia R13.10    Pseudoseizure F44.5    Chronic pain syndrome G89.4    Drug-seeking/Aberrant behavior Z76.5    Acute conjunctivitis infection) In Past    No Current Symptoms    Wears glasses     To Read       Past Surgical History:        Procedure Laterality Date    APPENDECTOMY  02/1998    Done When Tubes And Ovaries Were Removed    CARDIAC CATHETERIZATION  10/24/2010    CATHETER REMOVAL N/A 7/16/2019    PORT REMOVAL performed by Amaya Montgomery MD at [address] [facility]  08/27/1991    CHOLECYSTECTOMY, LAPAROSCOPIC  1990's    COLONOSCOPY  Last Done In 2000's    DENTAL SURGERY      Teeth Extracted In Past    ENDOSCOPY, COLON, DIAGNOSTIC  Last Done In 2018    FOOT DEBRIDEMENT Left 6/16/2019    FIRST METATARSAL DEBRIDEMENT INCISION AND DRAINAGE. EXCISION OF ULCER.  RESECTION OF BONE. 1ST METATARSAL POWER LAVAGE AND BONE CEMENT performed by Yefri Crawford DPM at [address] Left Last Done In 2016     Dr. Paulette Green, \" About 6 Surgeries Done Because Of Staph Infection\"    HIATAL HERNIA REPAIR N/A 2/12/2019    HERNIA HIATAL LAPAROSCOPIC ROBOTIC performed by Amaya Montgomery MD at [address] [facility]  10/1997    Partial Abdominal Hysterectomy    LAP BAND  ~2000    removed after 6 months    LUNG REMOVAL, PARTIAL Left 2008    Benign    OTHER SURGICAL HISTORY  02/1998    \"Tubes And Ovaries Removed, Appendectomy Also Done\"    OTHER SURGICAL HISTORY  Last Done 7-15-16    Mediport Insertion \"Total Of Six Done, Removed Last Mediport In 2014\"    SLEEVE GASTRECTOMY N/A 2/12/2019    GASTRECTOMY SLEEVE LAPAROSCOPIC ROBOTIC performed by Amaya Montgomery MD at [address] [facility]  08/27/2018    UPPER GASTROINTESTINAL ENDOSCOPY N/A 4/2/2019    EGD CONTROL HEMORRHAGE with epi injection at bleeding site performed by Amaya Montgomery MD at [address] [facility] N/A 6/19/2019    EGD DIAGNOSTIC ONLY performed by Amaya Montgomery MD at [address] [facility] History:    Social History     Tobacco Use    Smoking status:

## 2019-07-23 VITALS
SYSTOLIC BLOOD PRESSURE: 131 MMHG | WEIGHT: 277 LBS | BODY MASS INDEX: 47.29 KG/M2 | DIASTOLIC BLOOD PRESSURE: 68 MMHG | HEIGHT: 64 IN | HEART RATE: 86 BPM | RESPIRATION RATE: 21 BRPM | OXYGEN SATURATION: 99 % | TEMPERATURE: 98.6 F

## 2019-07-23 LAB
ALBUMIN SERPL-MCNC: 3.7 GM/DL (ref 3.4–5)
ALP BLD-CCNC: 107 IU/L (ref 40–128)
ALT SERPL-CCNC: 10 U/L (ref 10–40)
ANION GAP SERPL CALCULATED.3IONS-SCNC: 9 MMOL/L (ref 4–16)
APTT: 35.5 SECONDS (ref 21.2–33)
AST SERPL-CCNC: 19 IU/L (ref 15–37)
BASOPHILS ABSOLUTE: 0 K/CU MM
BASOPHILS RELATIVE PERCENT: 0.5 % (ref 0–1)
BILIRUB SERPL-MCNC: 0.2 MG/DL (ref 0–1)
BUN BLDV-MCNC: 10 MG/DL (ref 6–23)
CALCIUM IONIZED: 5.96 MG/DL (ref 4.48–5.28)
CALCIUM SERPL-MCNC: 11 MG/DL (ref 8.3–10.6)
CHLORIDE BLD-SCNC: 107 MMOL/L (ref 99–110)
CO2: 25 MMOL/L (ref 21–32)
CREAT SERPL-MCNC: 0.6 MG/DL (ref 0.6–1.1)
DIFFERENTIAL TYPE: ABNORMAL
EOSINOPHILS ABSOLUTE: 0.3 K/CU MM
EOSINOPHILS RELATIVE PERCENT: 7.1 % (ref 0–3)
GFR AFRICAN AMERICAN: >60 ML/MIN/1.73M2
GFR NON-AFRICAN AMERICAN: >60 ML/MIN/1.73M2
GLUCOSE BLD-MCNC: 100 MG/DL (ref 70–99)
HCT VFR BLD CALC: 25.7 % (ref 37–47)
HEMOGLOBIN: 7.7 GM/DL (ref 12.5–16)
IMMATURE NEUTROPHIL %: 0.3 % (ref 0–0.43)
INR BLD: 0.97 INDEX
IONIZED CA: 1.49 MMOL/L (ref 1.12–1.32)
LYMPHOCYTES ABSOLUTE: 1.6 K/CU MM
LYMPHOCYTES RELATIVE PERCENT: 42.2 % (ref 24–44)
MCH RBC QN AUTO: 24.6 PG (ref 27–31)
MCHC RBC AUTO-ENTMCNC: 30 % (ref 32–36)
MCV RBC AUTO: 82.1 FL (ref 78–100)
MONOCYTES ABSOLUTE: 0.3 K/CU MM
MONOCYTES RELATIVE PERCENT: 7.1 % (ref 0–4)
NUCLEATED RBC %: 0 %
PDW BLD-RTO: 15.8 % (ref 11.7–14.9)
PLATELET # BLD: 196 K/CU MM (ref 140–440)
PMV BLD AUTO: 10.8 FL (ref 7.5–11.1)
POTASSIUM SERPL-SCNC: 4.5 MMOL/L (ref 3.5–5.1)
PROTHROMBIN TIME: 11.1 SECONDS (ref 9.12–12.5)
RBC # BLD: 3.13 M/CU MM (ref 4.2–5.4)
SEGMENTED NEUTROPHILS ABSOLUTE COUNT: 1.6 K/CU MM
SEGMENTED NEUTROPHILS RELATIVE PERCENT: 42.8 % (ref 36–66)
SODIUM BLD-SCNC: 141 MMOL/L (ref 135–145)
TOTAL IMMATURE NEUTOROPHIL: 0.01 K/CU MM
TOTAL NUCLEATED RBC: 0 K/CU MM
TOTAL PROTEIN: 6.1 GM/DL (ref 6.4–8.2)
WBC # BLD: 3.8 K/CU MM (ref 4–10.5)

## 2019-07-23 PROCEDURE — 6370000000 HC RX 637 (ALT 250 FOR IP): Performed by: FAMILY MEDICINE

## 2019-07-23 PROCEDURE — 6360000002 HC RX W HCPCS: Performed by: SURGERY

## 2019-07-23 PROCEDURE — 80053 COMPREHEN METABOLIC PANEL: CPT

## 2019-07-23 PROCEDURE — 2580000003 HC RX 258: Performed by: SURGERY

## 2019-07-23 PROCEDURE — 6370000000 HC RX 637 (ALT 250 FOR IP): Performed by: SURGERY

## 2019-07-23 PROCEDURE — 99232 SBSQ HOSP IP/OBS MODERATE 35: CPT | Performed by: INTERNAL MEDICINE

## 2019-07-23 PROCEDURE — C9113 INJ PANTOPRAZOLE SODIUM, VIA: HCPCS | Performed by: SPECIALIST

## 2019-07-23 PROCEDURE — 82330 ASSAY OF CALCIUM: CPT

## 2019-07-23 PROCEDURE — 6360000002 HC RX W HCPCS: Performed by: SPECIALIST

## 2019-07-23 PROCEDURE — 85025 COMPLETE CBC W/AUTO DIFF WBC: CPT

## 2019-07-23 PROCEDURE — 94761 N-INVAS EAR/PLS OXIMETRY MLT: CPT

## 2019-07-23 PROCEDURE — 6360000002 HC RX W HCPCS: Performed by: FAMILY MEDICINE

## 2019-07-23 PROCEDURE — 6370000000 HC RX 637 (ALT 250 FOR IP): Performed by: SPECIALIST

## 2019-07-23 PROCEDURE — 85610 PROTHROMBIN TIME: CPT

## 2019-07-23 PROCEDURE — 2500000003 HC RX 250 WO HCPCS: Performed by: FAMILY MEDICINE

## 2019-07-23 PROCEDURE — 99232 SBSQ HOSP IP/OBS MODERATE 35: CPT | Performed by: SURGERY

## 2019-07-23 PROCEDURE — 85730 THROMBOPLASTIN TIME PARTIAL: CPT

## 2019-07-23 RX ORDER — ONDANSETRON 4 MG/1
4 TABLET, ORALLY DISINTEGRATING ORAL EVERY 8 HOURS PRN
Qty: 30 TABLET | Refills: 2 | Status: ON HOLD | OUTPATIENT
Start: 2019-07-23 | End: 2020-07-23 | Stop reason: SDUPTHER

## 2019-07-23 RX ORDER — CEFEPIME HYDROCHLORIDE 2 G/1
2 INJECTION, POWDER, FOR SOLUTION INTRAVENOUS EVERY 8 HOURS
Qty: 42 G | Refills: 0 | Status: SHIPPED | OUTPATIENT
Start: 2019-07-23 | End: 2019-07-30

## 2019-07-23 RX ORDER — PANTOPRAZOLE SODIUM 20 MG/1
40 TABLET, DELAYED RELEASE ORAL 2 TIMES DAILY
Qty: 60 TABLET | Refills: 5 | Status: SHIPPED | OUTPATIENT
Start: 2019-07-23 | End: 2019-12-04

## 2019-07-23 RX ORDER — CEFAZOLIN SODIUM 2 G/100ML
2 INJECTION, SOLUTION INTRAVENOUS EVERY 8 HOURS
Qty: 3000 ML | Refills: 0 | Status: SHIPPED | OUTPATIENT
Start: 2019-07-23 | End: 2019-07-23 | Stop reason: HOSPADM

## 2019-07-23 RX ORDER — LORAZEPAM 0.5 MG/1
0.5 TABLET ORAL EVERY 12 HOURS PRN
Qty: 10 TABLET | Refills: 0 | Status: SHIPPED | OUTPATIENT
Start: 2019-07-23 | End: 2019-08-22

## 2019-07-23 RX ORDER — PROMETHAZINE HYDROCHLORIDE 25 MG/1
12.5 TABLET ORAL EVERY 6 HOURS PRN
Qty: 20 TABLET | Refills: 0 | Status: SHIPPED | OUTPATIENT
Start: 2019-07-23 | End: 2019-09-25 | Stop reason: SDUPTHER

## 2019-07-23 RX ADMIN — GABAPENTIN 800 MG: 400 CAPSULE ORAL at 10:03

## 2019-07-23 RX ADMIN — PANTOPRAZOLE SODIUM 40 MG: 40 INJECTION, POWDER, FOR SOLUTION INTRAVENOUS at 10:03

## 2019-07-23 RX ADMIN — HYDROMORPHONE HYDROCHLORIDE 0.5 MG: 2 INJECTION, SOLUTION INTRAMUSCULAR; INTRAVENOUS; SUBCUTANEOUS at 00:15

## 2019-07-23 RX ADMIN — LORAZEPAM 0.5 MG: 0.5 TABLET ORAL at 14:24

## 2019-07-23 RX ADMIN — POTASSIUM CHLORIDE, DEXTROSE MONOHYDRATE AND SODIUM CHLORIDE: 150; 5; 450 INJECTION, SOLUTION INTRAVENOUS at 12:47

## 2019-07-23 RX ADMIN — HYDROCODONE BITARTRATE AND ACETAMINOPHEN 1 TABLET: 7.5; 325 TABLET ORAL at 11:46

## 2019-07-23 RX ADMIN — Medication 10 ML: at 10:05

## 2019-07-23 RX ADMIN — HYDROMORPHONE HYDROCHLORIDE 0.5 MG: 2 INJECTION, SOLUTION INTRAMUSCULAR; INTRAVENOUS; SUBCUTANEOUS at 12:48

## 2019-07-23 RX ADMIN — DICYCLOMINE HYDROCHLORIDE 20 MG: 20 TABLET ORAL at 00:15

## 2019-07-23 RX ADMIN — Medication 5000 UNITS: at 10:02

## 2019-07-23 RX ADMIN — ATENOLOL 25 MG: 25 TABLET ORAL at 10:04

## 2019-07-23 RX ADMIN — SODIUM CHLORIDE, PRESERVATIVE FREE 10 ML: 5 INJECTION INTRAVENOUS at 10:02

## 2019-07-23 RX ADMIN — HYDROMORPHONE HYDROCHLORIDE 0.5 MG: 2 INJECTION, SOLUTION INTRAMUSCULAR; INTRAVENOUS; SUBCUTANEOUS at 06:34

## 2019-07-23 RX ADMIN — FERROUS SULFATE TAB 325 MG (65 MG ELEMENTAL FE) 325 MG: 325 (65 FE) TAB at 10:03

## 2019-07-23 RX ADMIN — SUCRALFATE 1 G: 1 TABLET ORAL at 06:34

## 2019-07-23 RX ADMIN — CEFEPIME HYDROCHLORIDE 2 G: 2 INJECTION, POWDER, FOR SOLUTION INTRAVENOUS at 06:58

## 2019-07-23 RX ADMIN — SUCRALFATE 1 G: 1 TABLET ORAL at 00:15

## 2019-07-23 RX ADMIN — MULTIPLE VITAMINS W/ MINERALS TAB 1 TABLET: TAB at 10:03

## 2019-07-23 RX ADMIN — DICYCLOMINE HYDROCHLORIDE 20 MG: 20 TABLET ORAL at 11:46

## 2019-07-23 RX ADMIN — ESTRADIOL 0.5 MG: 1 TABLET ORAL at 10:03

## 2019-07-23 RX ADMIN — OYSTER SHELL CALCIUM WITH VITAMIN D 1 TABLET: 500; 200 TABLET, FILM COATED ORAL at 10:04

## 2019-07-23 RX ADMIN — PROMETHAZINE HYDROCHLORIDE 12.5 MG: 25 INJECTION, SOLUTION INTRAMUSCULAR; INTRAVENOUS at 12:47

## 2019-07-23 RX ADMIN — LEVOTHYROXINE SODIUM 125 MCG: 125 TABLET ORAL at 06:34

## 2019-07-23 RX ADMIN — SUCRALFATE 1 G: 1 TABLET ORAL at 11:46

## 2019-07-23 RX ADMIN — PROMETHAZINE HYDROCHLORIDE 12.5 MG: 25 INJECTION, SOLUTION INTRAMUSCULAR; INTRAVENOUS at 00:16

## 2019-07-23 RX ADMIN — PROMETHAZINE HYDROCHLORIDE 12.5 MG: 25 INJECTION, SOLUTION INTRAMUSCULAR; INTRAVENOUS at 06:34

## 2019-07-23 RX ADMIN — CLONIDINE HYDROCHLORIDE 0.1 MG: 0.1 TABLET ORAL at 10:04

## 2019-07-23 RX ADMIN — DICYCLOMINE HYDROCHLORIDE 20 MG: 20 TABLET ORAL at 06:34

## 2019-07-23 RX ADMIN — LORAZEPAM 0.5 MG: 0.5 TABLET ORAL at 03:11

## 2019-07-23 ASSESSMENT — PAIN SCALES - GENERAL
PAINLEVEL_OUTOF10: 7
PAINLEVEL_OUTOF10: 7
PAINLEVEL_OUTOF10: 4
PAINLEVEL_OUTOF10: 6
PAINLEVEL_OUTOF10: 10
PAINLEVEL_OUTOF10: 0

## 2019-07-23 NOTE — DISCHARGE SUMMARY
HYDROcodone-acetaminophen (NORCO) 7.5-325 MG per tablet Take 1 tablet by mouth every 6 hours as needed for Pain.       estradiol (ESTRACE) 0.5 MG tablet Take 0.5 mg by mouth daily      dicyclomine (BENTYL) 20 MG tablet Take 20 mg by mouth every 6 hours      atenolol (TENORMIN) 25 MG tablet Take 25 mg by mouth daily      cloNIDine (CATAPRES) 0.1 MG tablet Take 0.1 mg by mouth 2 times daily      Multiple Vitamin (MVI, BARIATRIC ADVANTAGE MULTI-FORMULA, CHEW TAB) Take 1 tablet by mouth daily  Qty: 30 tablet, Refills: 5      Calcium Citrate-Vitamin D (CALCIUM + VIT D, BARIATRIC ADVANTAGE, CHEWABLE TABLET) Take 1 tablet by mouth 2 times daily  Qty: 120 tablet, Refills: 5      levothyroxine (SYNTHROID) 125 MCG tablet Take 1 tablet by mouth Daily  Qty: 30 tablet, Refills: 0      gabapentin (NEURONTIN) 800 MG tablet Take 800 mg by mouth 3 times daily      PARoxetine (PAXIL) 30 MG tablet Take 30 mg by mouth nightly       cefUROXime (CEFTIN) 500 MG tablet Take 1 tablet by mouth 2 times daily Start on 7/29/19  Qty: 84 tablet, Refills: 0           Current Discharge Medication List      STOP taking these medications       omeprazole (PRILOSEC) 40 MG delayed release capsule Comments:   Reason for Stopping:               Discharge ROS:  A complete review of systems was asked and negative except for abd pain     Discharge Exam:    /68   Pulse 86   Temp 98.6 °F (37 °C) (Oral)   Resp 21   Ht 5' 4\" (1.626 m)   Wt 277 lb (125.6 kg)   SpO2 99%   BMI 47.55 kg/m²   General appearance:  NAD  Heart[de-identified] Normal s1/s2, RRR, no murmurs, gallops, or rubs. + leg edema  Lungs:  Clear to auscultation, bilaterally without Rales/Wheezes/Rhonchi. Abdomen: Soft, non-tender, non-distended, bowel sounds present  Musculoskeletal:   no cyanosis, + edema  Neurologic:  Cranial nerves: II-XII intact, grossly non-focal.  Psychiatric:  A & O x3      Labs:  For convenience and continuity at follow-up the following most recent labs are provided:    Lab Results   Component Value Date    WBC 3.8 07/23/2019    HGB 7.7 07/23/2019    HCT 25.7 07/23/2019    MCV 82.1 07/23/2019     07/23/2019     07/23/2019    K 4.5 07/23/2019     07/23/2019    CO2 25 07/23/2019    BUN 10 07/23/2019    CREATININE 0.6 07/23/2019    CALCIUM 11.0 07/23/2019    PHOS 4.4 12/04/2015    BNP 88 11/26/2010    ALKPHOS 107 07/23/2019    ALT 10 07/23/2019    AST 19 07/23/2019    BILITOT 0.2 07/23/2019    BILIDIR 0.2 01/02/2016    LABALBU 3.7 07/23/2019    LABALBU 8 11/18/2015    TRIG 161 07/23/2015     Lab Results   Component Value Date    INR 0.97 07/23/2019    INR 1.04 07/21/2019    INR 1.00 07/21/2019           Chart review shows recent radiographs:  Ct Abdomen Pelvis Wo Contrast    Result Date: 7/15/2019  EXAMINATION: CT OF THE ABDOMEN AND PELVIS WITHOUT CONTRAST 7/14/2019 8:31 am TECHNIQUE: CT of the abdomen and pelvis was performed without the administration of intravenous contrast. Multiplanar reformatted images are provided for review. Dose modulation, iterative reconstruction, and/or weight based adjustment of the mA/kV was utilized to reduce the radiation dose to as low as reasonably achievable. COMPARISON: 03/31/2019 HISTORY: ORDERING SYSTEM PROVIDED HISTORY: ABDOMINAL PAIN TECHNOLOGIST PROVIDED HISTORY: Reason for Exam: Abdominal pain; hematemesis Acuity: Acute Type of Exam: Initial Additional signs and symptoms: None Relevant Medical/Surgical History: Hx Lupus, Gastrectomy, Hernia repair, Hyster, Appy, Diane FINDINGS: Lower Chest: Scattered 2-4 mm solid lower lobe pulmonary nodules, largest measuring up to 4 mm in left lower lobe (image 28). Organs: Status post cholecystectomy. The unenhanced liver, spleen, pancreas and adrenal glands are without focal abnormality. There is mild pneumobilia similar in appearance to the prior exam.  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 No renal or ureteral calculus.   No hydronephrosis or perinephric stranding. GI/Bowel: Postsurgical changes from sleeve gastrectomy. The appendix is not visualized. No dilated loops of bowel or bowel wall thickening. No free air. Pelvis: Status post hysterectomy. The bladder is decompressed. No pelvic adenopathy or free fluid. Peritoneum/Retroperitoneum: The aorta is normal in caliber. No pathologically enlarged adenopathy. No ascites or drainable fluid collection. Bones/Soft Tissues: No acute findings in the bones or soft tissues. Stable exam.  No new acute abdominal or pelvic abnormality on this unenhanced study. Xr Chest Standard (2 Vw)    Result Date: 7/6/2019  EXAMINATION: TWO XRAY VIEWS OF THE CHEST 7/6/2019 1:13 pm COMPARISON: 03/20/2019 HISTORY: ORDERING SYSTEM PROVIDED HISTORY: chest pain TECHNOLOGIST PROVIDED HISTORY: Reason for exam:->chest pain Reason for Exam: chest pain Acuity: Unknown Type of Exam: Unknown FINDINGS: There is a left chest Port-A-Cath. The mediastinal and cardiac contours are normal.  The lungs are clear. There is no focal consolidation, pleural effusion or pneumothorax evident. The bones are unremarkable. No acute cardiopulmonary process identified. Xr Foot Left (min 3 Views)    Result Date: 7/14/2019  EXAMINATION: 3 XRAY VIEWS OF THE LEFT FOOT 7/14/2019 6:42 pm COMPARISON: Three views left foot 06/12/2019 HISTORY: ORDERING SYSTEM PROVIDED HISTORY: foot pain TECHNOLOGIST PROVIDED HISTORY: Reason for exam:->foot pain Reason for Exam: foot pain Acuity: Unknown Type of Exam: Unknown Relevant Medical/Surgical History: cardiac cath, copd FINDINGS: Osseous structures of the left foot appear intact and are aligned normally. 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 Stable well-circumscribed lucency at the base of 1st metatarsal bone, about 7 x 7 mm in size typical of a geode. Stable flattening of the 3rd metatarsal head with 3rd metatarsophalangeal joint osteoarthritis. Other joint spaces appear well maintained.  Soft

## 2019-07-24 ENCOUNTER — TELEPHONE (OUTPATIENT)
Dept: INFECTIOUS DISEASES | Age: 49
End: 2019-07-24

## 2019-07-24 LAB
CULTURE: NORMAL
CULTURE: NORMAL
Lab: NORMAL
Lab: NORMAL
SPECIMEN: NORMAL
SPECIMEN: NORMAL

## 2019-07-24 NOTE — TELEPHONE ENCOUNTER
Debbie Elkins, from [facility] called and states that pt has a double lumen PICC line, one side is not working and the other side is questionable. Pt has appt next week 7-31-19. Please advise.     Contact #:[phone]

## 2019-07-30 ENCOUNTER — TELEPHONE (OUTPATIENT)
Dept: SURGERY | Age: 49
End: 2019-07-30

## 2019-07-30 ENCOUNTER — HOSPITAL ENCOUNTER (OUTPATIENT)
Age: 49
Setting detail: SPECIMEN
Discharge: HOME | End: 2019-07-30
Payer: COMMERCIAL

## 2019-07-30 LAB
ALBUMIN SERPL-MCNC: 4.3 GM/DL (ref 3.4–5)
ALP BLD-CCNC: 115 IU/L (ref 40–128)
ALT SERPL-CCNC: 16 U/L (ref 10–40)
ANION GAP SERPL CALCULATED.3IONS-SCNC: 11 MMOL/L (ref 4–16)
AST SERPL-CCNC: 27 IU/L (ref 15–37)
BASOPHILS ABSOLUTE: 0 K/CU MM
BASOPHILS RELATIVE PERCENT: 0.7 % (ref 0–1)
BILIRUB SERPL-MCNC: 0.3 MG/DL (ref 0–1)
BUN BLDV-MCNC: 13 MG/DL (ref 6–23)
C-REACTIVE PROTEIN, HIGH SENSITIVITY: 2.9 MG/L
CALCIUM SERPL-MCNC: 11.1 MG/DL (ref 8.3–10.6)
CHLORIDE BLD-SCNC: 104 MMOL/L (ref 99–110)
CO2: 24 MMOL/L (ref 21–32)
CREAT SERPL-MCNC: 0.7 MG/DL (ref 0.6–1.1)
DIFFERENTIAL TYPE: ABNORMAL
EOSINOPHILS ABSOLUTE: 0.2 K/CU MM
EOSINOPHILS RELATIVE PERCENT: 5.1 % (ref 0–3)
ERYTHROCYTE SEDIMENTATION RATE: 33 MM/HR (ref 0–20)
GFR AFRICAN AMERICAN: >60 ML/MIN/1.73M2
GFR NON-AFRICAN AMERICAN: >60 ML/MIN/1.73M2
GLUCOSE BLD-MCNC: 86 MG/DL (ref 70–99)
HCT VFR BLD CALC: 29 % (ref 37–47)
HEMOGLOBIN: 8.6 GM/DL (ref 12.5–16)
IMMATURE NEUTROPHIL %: 0 % (ref 0–0.43)
LYMPHOCYTES ABSOLUTE: 1.6 K/CU MM
LYMPHOCYTES RELATIVE PERCENT: 36.1 % (ref 24–44)
MCH RBC QN AUTO: 24.5 PG (ref 27–31)
MCHC RBC AUTO-ENTMCNC: 29.7 % (ref 32–36)
MCV RBC AUTO: 82.6 FL (ref 78–100)
MONOCYTES ABSOLUTE: 0.3 K/CU MM
MONOCYTES RELATIVE PERCENT: 7.3 % (ref 0–4)
NUCLEATED RBC %: 0 %
PDW BLD-RTO: 16 % (ref 11.7–14.9)
PLATELET # BLD: 232 K/CU MM (ref 140–440)
PMV BLD AUTO: 10.8 FL (ref 7.5–11.1)
POTASSIUM SERPL-SCNC: 4.6 MMOL/L (ref 3.5–5.1)
RBC # BLD: 3.51 M/CU MM (ref 4.2–5.4)
SEGMENTED NEUTROPHILS ABSOLUTE COUNT: 2.3 K/CU MM
SEGMENTED NEUTROPHILS RELATIVE PERCENT: 50.8 % (ref 36–66)
SODIUM BLD-SCNC: 139 MMOL/L (ref 135–145)
TOTAL IMMATURE NEUTOROPHIL: 0 K/CU MM
TOTAL NUCLEATED RBC: 0 K/CU MM
TOTAL PROTEIN: 6.8 GM/DL (ref 6.4–8.2)
WBC # BLD: 4.5 K/CU MM (ref 4–10.5)

## 2019-07-30 PROCEDURE — 86140 C-REACTIVE PROTEIN: CPT

## 2019-07-30 PROCEDURE — 80053 COMPREHEN METABOLIC PANEL: CPT

## 2019-07-30 PROCEDURE — 85025 COMPLETE CBC W/AUTO DIFF WBC: CPT

## 2019-07-30 PROCEDURE — 85652 RBC SED RATE AUTOMATED: CPT

## 2019-08-05 ENCOUNTER — HOSPITAL ENCOUNTER (OUTPATIENT)
Age: 49
Setting detail: SPECIMEN
Discharge: HOME | End: 2019-08-05
Payer: COMMERCIAL

## 2019-08-05 ENCOUNTER — TELEPHONE (OUTPATIENT)
Dept: INFECTIOUS DISEASES | Age: 49
End: 2019-08-05

## 2019-08-05 LAB
ALBUMIN SERPL-MCNC: 4.2 GM/DL (ref 3.4–5)
ALP BLD-CCNC: 112 IU/L (ref 40–128)
ALT SERPL-CCNC: 14 U/L (ref 10–40)
ANION GAP SERPL CALCULATED.3IONS-SCNC: 10 MMOL/L (ref 4–16)
AST SERPL-CCNC: 19 IU/L (ref 15–37)
BASOPHILS ABSOLUTE: 0 K/CU MM
BASOPHILS RELATIVE PERCENT: 0.6 % (ref 0–1)
BILIRUB SERPL-MCNC: 0.2 MG/DL (ref 0–1)
BUN BLDV-MCNC: 14 MG/DL (ref 6–23)
C-REACTIVE PROTEIN, HIGH SENSITIVITY: 2.1 MG/L
CALCIUM SERPL-MCNC: 10.7 MG/DL (ref 8.3–10.6)
CHLORIDE BLD-SCNC: 107 MMOL/L (ref 99–110)
CO2: 26 MMOL/L (ref 21–32)
CREAT SERPL-MCNC: 0.7 MG/DL (ref 0.6–1.1)
DIFFERENTIAL TYPE: ABNORMAL
EOSINOPHILS ABSOLUTE: 0.2 K/CU MM
EOSINOPHILS RELATIVE PERCENT: 5.8 % (ref 0–3)
ERYTHROCYTE SEDIMENTATION RATE: 22 MM/HR (ref 0–20)
GFR AFRICAN AMERICAN: >60 ML/MIN/1.73M2
GFR NON-AFRICAN AMERICAN: >60 ML/MIN/1.73M2
GLUCOSE BLD-MCNC: 89 MG/DL (ref 70–99)
HCT VFR BLD CALC: 28.5 % (ref 37–47)
HEMOGLOBIN: 8.2 GM/DL (ref 12.5–16)
IMMATURE NEUTROPHIL %: 0 % (ref 0–0.43)
LYMPHOCYTES ABSOLUTE: 1.6 K/CU MM
LYMPHOCYTES RELATIVE PERCENT: 43.5 % (ref 24–44)
MCH RBC QN AUTO: 23.6 PG (ref 27–31)
MCHC RBC AUTO-ENTMCNC: 28.8 % (ref 32–36)
MCV RBC AUTO: 81.9 FL (ref 78–100)
MONOCYTES ABSOLUTE: 0.3 K/CU MM
MONOCYTES RELATIVE PERCENT: 7.8 % (ref 0–4)
NUCLEATED RBC %: 0 %
PDW BLD-RTO: 14.9 % (ref 11.7–14.9)
PLATELET # BLD: 192 K/CU MM (ref 140–440)
PMV BLD AUTO: 11.6 FL (ref 7.5–11.1)
POTASSIUM SERPL-SCNC: 4.5 MMOL/L (ref 3.5–5.1)
RBC # BLD: 3.48 M/CU MM (ref 4.2–5.4)
SEGMENTED NEUTROPHILS ABSOLUTE COUNT: 1.5 K/CU MM
SEGMENTED NEUTROPHILS RELATIVE PERCENT: 42.3 % (ref 36–66)
SODIUM BLD-SCNC: 143 MMOL/L (ref 135–145)
TOTAL IMMATURE NEUTOROPHIL: 0 K/CU MM
TOTAL NUCLEATED RBC: 0 K/CU MM
TOTAL PROTEIN: 6.4 GM/DL (ref 6.4–8.2)
WBC # BLD: 3.6 K/CU MM (ref 4–10.5)

## 2019-08-05 PROCEDURE — 80053 COMPREHEN METABOLIC PANEL: CPT

## 2019-08-05 PROCEDURE — 85025 COMPLETE CBC W/AUTO DIFF WBC: CPT

## 2019-08-05 PROCEDURE — 85652 RBC SED RATE AUTOMATED: CPT

## 2019-08-05 PROCEDURE — 86140 C-REACTIVE PROTEIN: CPT

## 2019-08-06 ENCOUNTER — TELEPHONE (OUTPATIENT)
Dept: BARIATRICS/WEIGHT MGMT | Age: 49
End: 2019-08-06

## 2019-08-07 ENCOUNTER — OFFICE VISIT (OUTPATIENT)
Dept: BARIATRICS/WEIGHT MGMT | Age: 49
End: 2019-08-07
Payer: COMMERCIAL

## 2019-08-07 ENCOUNTER — HOSPITAL ENCOUNTER (OUTPATIENT)
Age: 49
Setting detail: SPECIMEN
Discharge: HOME | End: 2019-08-07
Payer: COMMERCIAL

## 2019-08-07 VITALS
RESPIRATION RATE: 16 BRPM | BODY MASS INDEX: 44.49 KG/M2 | DIASTOLIC BLOOD PRESSURE: 70 MMHG | SYSTOLIC BLOOD PRESSURE: 102 MMHG | OXYGEN SATURATION: 93 % | HEART RATE: 73 BPM | HEIGHT: 64 IN | WEIGHT: 260.6 LBS

## 2019-08-07 DIAGNOSIS — B95.62 MRSA CELLULITIS OF LEFT FOOT: ICD-10-CM

## 2019-08-07 DIAGNOSIS — L03.116 MRSA CELLULITIS OF LEFT FOOT: ICD-10-CM

## 2019-08-07 DIAGNOSIS — Z98.84 STATUS POST LAPAROSCOPIC SLEEVE GASTRECTOMY: Primary | ICD-10-CM

## 2019-08-07 LAB
BASOPHILS ABSOLUTE: 0 K/CU MM
BASOPHILS RELATIVE PERCENT: 0.6 % (ref 0–1)
DIFFERENTIAL TYPE: ABNORMAL
EOSINOPHILS ABSOLUTE: 0.2 K/CU MM
EOSINOPHILS RELATIVE PERCENT: 6 % (ref 0–3)
HCT VFR BLD CALC: 27.6 % (ref 37–47)
HEMOGLOBIN: 8.1 GM/DL (ref 12.5–16)
IMMATURE NEUTROPHIL %: 0.3 % (ref 0–0.43)
LYMPHOCYTES ABSOLUTE: 1.3 K/CU MM
LYMPHOCYTES RELATIVE PERCENT: 38.3 % (ref 24–44)
MCH RBC QN AUTO: 24 PG (ref 27–31)
MCHC RBC AUTO-ENTMCNC: 29.3 % (ref 32–36)
MCV RBC AUTO: 81.9 FL (ref 78–100)
MONOCYTES ABSOLUTE: 0.3 K/CU MM
MONOCYTES RELATIVE PERCENT: 9 % (ref 0–4)
NUCLEATED RBC %: 0 %
PDW BLD-RTO: 14.7 % (ref 11.7–14.9)
PLATELET # BLD: 166 K/CU MM (ref 140–440)
PMV BLD AUTO: 11.2 FL (ref 7.5–11.1)
RBC # BLD: 3.37 M/CU MM (ref 4.2–5.4)
SEGMENTED NEUTROPHILS ABSOLUTE COUNT: 1.5 K/CU MM
SEGMENTED NEUTROPHILS RELATIVE PERCENT: 45.8 % (ref 36–66)
TOTAL IMMATURE NEUTOROPHIL: 0.01 K/CU MM
TOTAL NUCLEATED RBC: 0 K/CU MM
WBC # BLD: 3.3 K/CU MM (ref 4–10.5)

## 2019-08-07 PROCEDURE — 1111F DSCHRG MED/CURRENT MED MERGE: CPT | Performed by: SURGERY

## 2019-08-07 PROCEDURE — 1036F TOBACCO NON-USER: CPT | Performed by: SURGERY

## 2019-08-07 PROCEDURE — G8417 CALC BMI ABV UP PARAM F/U: HCPCS | Performed by: SURGERY

## 2019-08-07 PROCEDURE — 3017F COLORECTAL CA SCREEN DOC REV: CPT | Performed by: SURGERY

## 2019-08-07 PROCEDURE — G8427 DOCREV CUR MEDS BY ELIG CLIN: HCPCS | Performed by: SURGERY

## 2019-08-07 PROCEDURE — 85025 COMPLETE CBC W/AUTO DIFF WBC: CPT

## 2019-08-07 PROCEDURE — 99214 OFFICE O/P EST MOD 30 MIN: CPT | Performed by: SURGERY

## 2019-08-07 RX ORDER — FEEDER CONTAINER WITH PUMP SET
1 EACH MISCELLANEOUS
Qty: 32 CAN | Refills: 3 | Status: SHIPPED | OUTPATIENT
Start: 2019-08-07 | End: 2020-07-02

## 2019-08-07 ASSESSMENT — ENCOUNTER SYMPTOMS
SHORTNESS OF BREATH: 0
BLOOD IN STOOL: 0
COUGH: 0
VOMITING: 0
DIARRHEA: 0
WHEEZING: 0
VOICE CHANGE: 0
SORE THROAT: 0
COLOR CHANGE: 0
ANAL BLEEDING: 0
PHOTOPHOBIA: 0
ABDOMINAL PAIN: 0
TROUBLE SWALLOWING: 0
CONSTIPATION: 0
NAUSEA: 0

## 2019-08-07 NOTE — PROGRESS NOTES
mouth 2 times daily 120 tablet 5    levothyroxine (SYNTHROID) 125 MCG tablet Take 1 tablet by mouth Daily (Patient taking differently: Take 125 mcg by mouth nightly ) 30 tablet 0    gabapentin (NEURONTIN) 800 MG tablet Take 800 mg by mouth 3 times daily      PARoxetine (PAXIL) 30 MG tablet Take 30 mg by mouth nightly        No current facility-administered medications for this visit. Allergies   Allergen Reactions    Aspirin Palpitations     \"My Heart Rate Elevates\"    Compazine [Prochlorperazine Maleate] Rash    Reglan [Metoclopramide Hcl] Rash    Shellfish-Derived Products Swelling    Toradol [Ketorolac Tromethamine] Rash           Review of Systems   Constitutional: Negative for activity change, chills, diaphoresis and fever. HENT: Negative for sore throat, trouble swallowing and voice change. Eyes: Negative for photophobia and visual disturbance. Respiratory: Negative for cough, shortness of breath and wheezing. Cardiovascular: Negative for chest pain, palpitations and leg swelling. Gastrointestinal: Negative for abdominal pain, anal bleeding, blood in stool, constipation, diarrhea, nausea and vomiting. Endocrine: Negative for cold intolerance, heat intolerance, polydipsia and polyuria. Genitourinary: Negative for dysuria, frequency and hematuria. Musculoskeletal: Positive for arthralgias (Left foot). Negative for joint swelling, myalgias and neck stiffness. Skin: Negative for color change and rash. Neurological: Negative for seizures, speech difficulty, light-headedness and numbness. Hematological: Negative for adenopathy. Does not bruise/bleed easily. OBJECTIVE:    /70 (Site: Left Upper Arm, Position: Sitting, Cuff Size: Large Adult)   Pulse 73   Resp 16   Ht 5' 4.25\" (1.632 m)   Wt 260 lb 9.6 oz (118.2 kg)   SpO2 93%   BMI 44.38 kg/m²    Body mass index is 44.38 kg/m². Physical Exam   Constitutional: She is oriented to person, place, and time.  She appears well-developed and well-nourished. No distress. HENT:   Head: Normocephalic and atraumatic. Eyes: Pupils are equal, round, and reactive to light. Conjunctivae and EOM are normal. No scleral icterus. Neck: Normal range of motion. No JVD present. No tracheal deviation present. No thyromegaly present. Cardiovascular: Normal rate, regular rhythm, normal heart sounds and intact distal pulses. Exam reveals no gallop and no friction rub. No murmur heard. Pulmonary/Chest: Effort normal. No stridor. No respiratory distress. She has no wheezes. She has no rales. She exhibits no tenderness. Abdominal: Soft. Bowel sounds are normal. She exhibits no distension and no mass. There is no tenderness. There is no rebound and no guarding. Musculoskeletal: Normal range of motion. She exhibits no edema or tenderness. Lymphadenopathy:     She has no cervical adenopathy. Neurological: She is alert and oriented to person, place, and time. No cranial nerve deficit. Coordination normal.   Skin: Skin is warm and dry. No rash noted. She is not diaphoretic. No erythema. No pallor. Psychiatric: Her behavior is normal. Judgment and thought content normal.   Vitals reviewed. ASSESSMENT & PLAN:    1. Status post laparoscopic sleeve gastrectomy    2. MRSA cellulitis of left foot         Needs her PICC removed, and a port Mediport, re-placed, and Oncology to address her anemia. REPEAT CBC TODAY before removing the PICC. Weight loss is adequate, hopefully she will continue. Patient counseled on the risks, benefits, and alternatives of treatment plan at length while in the office today. Patient states an understanding and willingness to proceed with the plan. Follow Up:  Return in about 2 weeks (around 8/21/2019) for Surgery. Awilda Ambrocio MD, FACS, FICS.    8/7/19       Patient was seen with total face to face time of 25 minutes.  More than 50% of this visit was counseling and education as above in

## 2019-08-08 ENCOUNTER — TELEPHONE (OUTPATIENT)
Dept: SURGERY | Age: 49
End: 2019-08-08

## 2019-08-12 ENCOUNTER — TELEPHONE (OUTPATIENT)
Dept: BARIATRICS/WEIGHT MGMT | Age: 49
End: 2019-08-12

## 2019-08-12 NOTE — TELEPHONE ENCOUNTER
Called patient to advise her Mediport w/ flouroscopy is scheduled with Dr Katelyn Gold 8/15/19 @ [phone] (arrival 1100) at [facility], phone assessment and needs instructed NPO after midnight unles told differently by RN that calls. No answer, left message to call back.

## 2019-08-14 ENCOUNTER — ANESTHESIA EVENT (OUTPATIENT)
Dept: OPERATING ROOM | Age: 49
End: 2019-08-14
Payer: COMMERCIAL

## 2019-08-14 ASSESSMENT — ENCOUNTER SYMPTOMS: SHORTNESS OF BREATH: 1

## 2019-08-14 NOTE — PROGRESS NOTES
8/14/19- . LM with surgery time change, surgery 8/15/19 @ 1430, arrival 1230, bring insurance card, picture ID and a . NPO 8 hours before surgery.

## 2019-08-14 NOTE — ANESTHESIA PRE PROCEDURE
kg)   07/10/19 266 lb 6.4 oz (120.8 kg)     Body mass index is 44.63 kg/m². CBC:   Lab Results   Component Value Date    WBC 3.3 08/07/2019    RBC 3.37 08/07/2019    HGB 8.1 08/07/2019    HCT 27.6 08/07/2019    MCV 81.9 08/07/2019    RDW 14.7 08/07/2019     08/07/2019       CMP:   Lab Results   Component Value Date     08/05/2019    K 4.5 08/05/2019     08/05/2019    CO2 26 08/05/2019    BUN 14 08/05/2019    CREATININE 0.7 08/05/2019    GFRAA >60 08/05/2019    LABGLOM >60 08/05/2019    GLUCOSE 89 08/05/2019    PROT 6.4 08/05/2019    PROT 6.5 11/18/2011    CALCIUM 10.7 08/05/2019    BILITOT 0.2 08/05/2019    ALKPHOS 112 08/05/2019    AST 19 08/05/2019    ALT 14 08/05/2019       POC Tests: No results for input(s): POCGLU, POCNA, POCK, POCCL, POCBUN, POCHEMO, POCHCT in the last 72 hours. Coags:   Lab Results   Component Value Date    PROTIME 11.1 07/23/2019    PROTIME 11.4 12/01/2010    INR 0.97 07/23/2019    APTT 35.5 07/23/2019       HCG (If Applicable):   Lab Results   Component Value Date    PREGTESTUR NEGATIVE 07/10/2017        ABGs: No results found for: PHART, PO2ART, EZL9MMU, BVL9WQX, BEART, D2VHHTIC     Type & Screen (If Applicable):  No results found for: LABABO, [address]    Anesthesia Evaluation  Patient summary reviewed and Nursing notes reviewed   history of anesthetic complications: PONV. Airway:         Dental:          Pulmonary:   (+) COPD:  shortness of breath:  sleep apnea:                             Cardiovascular:    (+) hypertension:, PARSONS:,       ECG reviewed      Echocardiogram reviewed         Beta Blocker:  Dose within 24 Hrs      ROS comment:  Summary   Normal left ventricle structure and systolic function with an ejection   fraction of 55-60%.  Grade I diastolic dysfunction.   Mildly dilated left atrium.   No significant valvular disease noted.   No evidence of pericardial effusion.   Essentially unremarkable echo.      Neuro/Psych:   (+) neuromuscular disease:,

## 2019-08-15 ENCOUNTER — HOSPITAL ENCOUNTER (OUTPATIENT)
Age: 49
Setting detail: OUTPATIENT SURGERY
Discharge: HOME | End: 2019-08-15
Attending: SURGERY | Admitting: SURGERY
Payer: COMMERCIAL

## 2019-08-15 ENCOUNTER — APPOINTMENT (OUTPATIENT)
Dept: GENERAL RADIOLOGY | Age: 49
End: 2019-08-15
Attending: SURGERY
Payer: COMMERCIAL

## 2019-08-15 ENCOUNTER — ANESTHESIA (OUTPATIENT)
Dept: OPERATING ROOM | Age: 49
End: 2019-08-15
Payer: COMMERCIAL

## 2019-08-15 VITALS
HEIGHT: 64 IN | TEMPERATURE: 97.1 F | HEART RATE: 60 BPM | WEIGHT: 260 LBS | SYSTOLIC BLOOD PRESSURE: 138 MMHG | BODY MASS INDEX: 44.39 KG/M2 | RESPIRATION RATE: 16 BRPM | DIASTOLIC BLOOD PRESSURE: 64 MMHG | OXYGEN SATURATION: 96 %

## 2019-08-15 VITALS — OXYGEN SATURATION: 100 % | SYSTOLIC BLOOD PRESSURE: 119 MMHG | DIASTOLIC BLOOD PRESSURE: 67 MMHG

## 2019-08-15 DIAGNOSIS — Z78.9 POOR INTRAVENOUS ACCESS: Primary | ICD-10-CM

## 2019-08-15 PROCEDURE — 3600000013 HC SURGERY LEVEL 3 ADDTL 15MIN: Performed by: SURGERY

## 2019-08-15 PROCEDURE — C1894 INTRO/SHEATH, NON-LASER: HCPCS | Performed by: SURGERY

## 2019-08-15 PROCEDURE — 36410 VNPNXR 3YR/> PHY/QHP DX/THER: CPT

## 2019-08-15 PROCEDURE — 3700000000 HC ANESTHESIA ATTENDED CARE: Performed by: SURGERY

## 2019-08-15 PROCEDURE — 76000 FLUOROSCOPY <1 HR PHYS/QHP: CPT

## 2019-08-15 PROCEDURE — 6360000002 HC RX W HCPCS: Performed by: SURGERY

## 2019-08-15 PROCEDURE — 6360000002 HC RX W HCPCS: Performed by: ANESTHESIOLOGY

## 2019-08-15 PROCEDURE — C1788 PORT, INDWELLING, IMP: HCPCS | Performed by: SURGERY

## 2019-08-15 PROCEDURE — 2580000003 HC RX 258: Performed by: ANESTHESIOLOGY

## 2019-08-15 PROCEDURE — 3600000003 HC SURGERY LEVEL 3 BASE: Performed by: SURGERY

## 2019-08-15 PROCEDURE — 7100000011 HC PHASE II RECOVERY - ADDTL 15 MIN: Performed by: SURGERY

## 2019-08-15 PROCEDURE — 36561 INSERT TUNNELED CV CATH: CPT | Performed by: SURGERY

## 2019-08-15 PROCEDURE — 7100000010 HC PHASE II RECOVERY - FIRST 15 MIN: Performed by: SURGERY

## 2019-08-15 PROCEDURE — 71045 X-RAY EXAM CHEST 1 VIEW: CPT

## 2019-08-15 PROCEDURE — 6360000002 HC RX W HCPCS: Performed by: NURSE ANESTHETIST, CERTIFIED REGISTERED

## 2019-08-15 PROCEDURE — 76937 US GUIDE VASCULAR ACCESS: CPT

## 2019-08-15 PROCEDURE — 77001 FLUOROGUIDE FOR VEIN DEVICE: CPT | Performed by: SURGERY

## 2019-08-15 PROCEDURE — 3700000001 HC ADD 15 MINUTES (ANESTHESIA): Performed by: SURGERY

## 2019-08-15 PROCEDURE — 2709999900 HC NON-CHARGEABLE SUPPLY: Performed by: SURGERY

## 2019-08-15 PROCEDURE — 2580000003 HC RX 258: Performed by: SURGERY

## 2019-08-15 PROCEDURE — C1751 CATH, INF, PER/CENT/MIDLINE: HCPCS

## 2019-08-15 DEVICE — PORT INFUS L55CM 0.016ML 0.4ML CATH OD2.2MM ID1.4MM INTRO: Type: IMPLANTABLE DEVICE | Status: FUNCTIONAL

## 2019-08-15 RX ORDER — HEPARIN SODIUM 5000 [USP'U]/ML
5000 INJECTION, SOLUTION INTRAVENOUS; SUBCUTANEOUS ONCE
Status: COMPLETED | OUTPATIENT
Start: 2019-08-15 | End: 2019-08-15

## 2019-08-15 RX ORDER — SODIUM CHLORIDE, SODIUM LACTATE, POTASSIUM CHLORIDE, CALCIUM CHLORIDE 600; 310; 30; 20 MG/100ML; MG/100ML; MG/100ML; MG/100ML
INJECTION, SOLUTION INTRAVENOUS CONTINUOUS
Status: DISCONTINUED | OUTPATIENT
Start: 2019-08-15 | End: 2019-08-15 | Stop reason: HOSPADM

## 2019-08-15 RX ORDER — AMOXICILLIN 250 MG
2 CAPSULE ORAL DAILY
Qty: 60 TABLET | Refills: 3 | Status: SHIPPED | OUTPATIENT
Start: 2019-08-15 | End: 2019-08-25

## 2019-08-15 RX ORDER — PROPOFOL 10 MG/ML
INJECTION, EMULSION INTRAVENOUS PRN
Status: DISCONTINUED | OUTPATIENT
Start: 2019-08-15 | End: 2019-08-15 | Stop reason: SDUPTHER

## 2019-08-15 RX ORDER — OXYCODONE HYDROCHLORIDE AND ACETAMINOPHEN 5; 325 MG/1; MG/1
1-2 TABLET ORAL EVERY 6 HOURS PRN
Qty: 21 TABLET | Refills: 0 | Status: SHIPPED | OUTPATIENT
Start: 2019-08-15 | End: 2019-08-22

## 2019-08-15 RX ORDER — ONDANSETRON 2 MG/ML
INJECTION INTRAMUSCULAR; INTRAVENOUS PRN
Status: DISCONTINUED | OUTPATIENT
Start: 2019-08-15 | End: 2019-08-15 | Stop reason: SDUPTHER

## 2019-08-15 RX ORDER — PROMETHAZINE HYDROCHLORIDE 25 MG/1
25 TABLET ORAL EVERY 6 HOURS PRN
Qty: 30 TABLET | Refills: 2 | Status: SHIPPED | OUTPATIENT
Start: 2019-08-15 | End: 2019-08-22

## 2019-08-15 RX ORDER — DEXAMETHASONE SODIUM PHOSPHATE 4 MG/ML
INJECTION, SOLUTION INTRA-ARTICULAR; INTRALESIONAL; INTRAMUSCULAR; INTRAVENOUS; SOFT TISSUE PRN
Status: DISCONTINUED | OUTPATIENT
Start: 2019-08-15 | End: 2019-08-15 | Stop reason: SDUPTHER

## 2019-08-15 RX ORDER — MIDAZOLAM HYDROCHLORIDE 1 MG/ML
INJECTION INTRAMUSCULAR; INTRAVENOUS PRN
Status: DISCONTINUED | OUTPATIENT
Start: 2019-08-15 | End: 2019-08-15 | Stop reason: SDUPTHER

## 2019-08-15 RX ORDER — MIDAZOLAM HYDROCHLORIDE 1 MG/ML
1 INJECTION INTRAMUSCULAR; INTRAVENOUS ONCE
Status: COMPLETED | OUTPATIENT
Start: 2019-08-15 | End: 2019-08-15

## 2019-08-15 RX ORDER — LIDOCAINE HYDROCHLORIDE 20 MG/ML
INJECTION, SOLUTION INTRAVENOUS PRN
Status: DISCONTINUED | OUTPATIENT
Start: 2019-08-15 | End: 2019-08-15 | Stop reason: SDUPTHER

## 2019-08-15 RX ORDER — FENTANYL CITRATE 50 UG/ML
INJECTION, SOLUTION INTRAMUSCULAR; INTRAVENOUS PRN
Status: DISCONTINUED | OUTPATIENT
Start: 2019-08-15 | End: 2019-08-15 | Stop reason: SDUPTHER

## 2019-08-15 RX ADMIN — DEXAMETHASONE SODIUM PHOSPHATE 4 MG: 4 INJECTION, SOLUTION INTRAMUSCULAR; INTRAVENOUS at 13:11

## 2019-08-15 RX ADMIN — ONDANSETRON 4 MG: 2 INJECTION INTRAMUSCULAR; INTRAVENOUS at 13:12

## 2019-08-15 RX ADMIN — SODIUM CHLORIDE, POTASSIUM CHLORIDE, SODIUM LACTATE AND CALCIUM CHLORIDE: 600; 310; 30; 20 INJECTION, SOLUTION INTRAVENOUS at 13:02

## 2019-08-15 RX ADMIN — PROPOFOL 25 MG: 10 INJECTION, EMULSION INTRAVENOUS at 13:19

## 2019-08-15 RX ADMIN — MIDAZOLAM HYDROCHLORIDE 1 MG: 2 INJECTION, SOLUTION INTRAMUSCULAR; INTRAVENOUS at 12:17

## 2019-08-15 RX ADMIN — HEPARIN SODIUM 5000 UNITS: 5000 INJECTION, SOLUTION INTRAVENOUS; SUBCUTANEOUS at 08:37

## 2019-08-15 RX ADMIN — PROPOFOL 20 MG: 10 INJECTION, EMULSION INTRAVENOUS at 13:21

## 2019-08-15 RX ADMIN — LIDOCAINE HYDROCHLORIDE 75 MG: 20 INJECTION, SOLUTION INTRAVENOUS at 13:11

## 2019-08-15 RX ADMIN — PROPOFOL 20 MG: 10 INJECTION, EMULSION INTRAVENOUS at 13:30

## 2019-08-15 RX ADMIN — MIDAZOLAM HYDROCHLORIDE 2 MG: 1 INJECTION, SOLUTION INTRAMUSCULAR; INTRAVENOUS at 13:02

## 2019-08-15 RX ADMIN — DEXTROSE MONOHYDRATE 3 G: 50 INJECTION, SOLUTION INTRAVENOUS at 13:11

## 2019-08-15 RX ADMIN — FENTANYL CITRATE 100 MCG: 50 INJECTION INTRAMUSCULAR; INTRAVENOUS at 13:10

## 2019-08-15 RX ADMIN — MIDAZOLAM HYDROCHLORIDE 2 MG: 1 INJECTION, SOLUTION INTRAMUSCULAR; INTRAVENOUS at 13:12

## 2019-08-15 ASSESSMENT — PULMONARY FUNCTION TESTS
PIF_VALUE: 0
PIF_VALUE: 1
PIF_VALUE: 0
PIF_VALUE: 1
PIF_VALUE: 0
PIF_VALUE: 1
PIF_VALUE: 0

## 2019-08-15 ASSESSMENT — PAIN SCALES - GENERAL
PAINLEVEL_OUTOF10: 0
PAINLEVEL_OUTOF10: 0

## 2019-08-15 NOTE — H&P
HISTORY AND PHYSICAL EXAM    Date of Admission:  8/15/2019    PCP:  Heather Leonard MD    Chief Complaint / History of Present Illness:  Brielle Wu is a very pleasant 46 y.o. female  Presenting for port replacement, the previous one got infected with MRSA, and she had osteomyelitis and it was removed few weeks ago. Today will re-place a new mediport in her Right neck, possibly left. PMH:   has a past medical history of Acid reflux, Anemia, Anxiety, Arthritis, Bleeding ulcer (), Bronchitis (Last Episode ), Chronic back pain, Chronic pain, COPD (chronic obstructive pulmonary disease) ([address] Depression, Fibromyalgia (Dx ), H/O echocardiogram (8/11/15), H/O echocardiogram (2018), Hiatal hernia, History of blood transfusion (2015 And ), [place] (hard of hearing), cardiac catheterization (10/24/2010), transesophageal echocardiography (PILO) for monitoring (2010), OTHER MEDICAL, OTHER MEDICAL, Hypertension, Lupus ([address] (Dx ), Morbid obesity ([address] Nausea, Pain management, Pancreatitis chronic (Dx ), Pneumonia (Dx 11-15), Shortness of breath on exertion, Shortness of breath on exertion, Sleep apnea, Staph infection (Dx 11-15), Staph infection (Dx 11-15), Teeth missing, Thyroid disease, UTI (urinary tract infection) (In Past), and Wears glasses. PSH:   has a past surgical history that includes Lung removal, partial (Left, );  section (1991); Foot surgery (Left, Last Done In ); Dental surgery; Cholecystectomy, laparoscopic (4397'P); other surgical history (1998); other surgical history (Last Done 7-15-16); Tonsillectomy and adenoidectomy (); Appendectomy (1998); Upper gastrointestinal endoscopy (2018); Lap Band (~); Hysterectomy (10/1997); Endoscopy, colon, diagnostic (Last Done In ); Colonoscopy (Last Done In 's); Cardiac catheterization (10/24/2010);  Sleeve Gastrectomy (N/A, 2019); hiatal hernia repair (N/A,

## 2019-08-15 NOTE — CONSULTS
Consult completed. Procedure/rationale explained to pt including benefits vs potential risks/complications; questions answered & consent obtained. #4Fr PowerMidLine initiated to LUE basilic vein using sterile, UltraSound-guided technique without difficulty/complications. Sterile dressing with SkinPrep, StatLock Securing Device, BioPatch, & SwabCap applied. CRYSTAL RN notified.

## 2019-08-15 NOTE — ANESTHESIA PRE PROCEDURE
Evaluation  Patient summary reviewed and Nursing notes reviewed   history of anesthetic complications: PONV. Airway: Mallampati: II  TM distance: >3 FB   Neck ROM: full  Mouth opening: > = 3 FB Dental:          Pulmonary:   (+) COPD:  sleep apnea:                             Cardiovascular:  Exercise tolerance: no interval change,   (+) hypertension:,       ECG reviewed      Echocardiogram reviewed         Beta Blocker:  Not on Beta Blocker      ROS comment:  Summary   Normal left ventricle structure and systolic function with an ejection   fraction of 55-60%.  Grade I diastolic dysfunction.   Mildly dilated left atrium.   No significant valvular disease noted.   No evidence of pericardial effusion.   Essentially unremarkable echo. Neuro/Psych:   (+) neuromuscular disease (fibromyalgia):, psychiatric history:            GI/Hepatic/Renal:   (+) hiatal hernia, GERD:, PUD, liver disease (ANDRADE):, morbid obesity (bmi 44.7)          Endo/Other:    (+) hypothyroidism, blood dyscrasia: anemia, arthritis: OA., .                  ROS comment: SLE Abdominal:           Vascular: negative vascular ROS. Anesthesia Plan      MAC     ASA 3       Induction: intravenous. Anesthetic plan and risks discussed with patient. Plan discussed with CRNA.                   Ariadna Smith DO   8/15/2019

## 2019-08-15 NOTE — OP NOTE
the right subclavian vein was cannulated without any difficulty. Dark venous blood was aspirated  freely without any difficulty. The guidewire was then passed through the  trocar all the way until it reached the heart. The trocar was then removed  and using the Seldinger technique the guidewire was upgraded to the 0.035 of the Mediport kit; then furthermore local anesthesia was injected for the subcutaneous tunnel  and port site implantation site all the way to the pectoral fascia. A transverse  incision 3 fingerbreadths below the midclavicular entry point of the  guidewire was performed. Dissection was carried down all the way to the  right anterior pectoral fascia. The transverse incision was about 4 cm. Dissection was  carried down to create the pocket and then the port was secured to the  anterior pectoral fascia x3 using 2-0 Prolene. The dilator was then passed  over the guidewire after an 11 blade skin nick incision was performed at the  guidewire entry site at the inferior and midclavicular point. Over the  guidewire, the dilator was advanced without any difficulty, and then the  dilator and sheath were passed over the guidewire, using the Seldinger technique. The guidewire and dilator  were then removed, and the catheter was fed through the sheath all the way until  it reached the right side of the heart. Under direct fluoroscopic visualization, the tip of  the catheter was pulled back gradually until it reached the junction between  the superior vena cava and the right atrium. The tunneler was used to pass  the catheter subcutaneously from the inferior midclavicular entry site to the  port pocket site. It was divided and connected with the port. The securer was pushed  to cover the line and the port to the hub and then the port was accessed with  a Bryant needle and dark venous blood was aspirated freely without any  difficulty.  It was then flushed using 30 mL of normal saline followed by 3  mL of

## 2019-08-21 ENCOUNTER — OFFICE VISIT (OUTPATIENT)
Dept: INFECTIOUS DISEASES | Age: 49
End: 2019-08-21
Payer: COMMERCIAL

## 2019-08-21 VITALS
OXYGEN SATURATION: 98 % | HEART RATE: 75 BPM | WEIGHT: 260 LBS | DIASTOLIC BLOOD PRESSURE: 80 MMHG | BODY MASS INDEX: 44.63 KG/M2 | SYSTOLIC BLOOD PRESSURE: 110 MMHG

## 2019-08-21 DIAGNOSIS — M86.472 CHRONIC OSTEOMYELITIS OF LEFT FOOT WITH DRAINING SINUS (HCC): Primary | ICD-10-CM

## 2019-08-21 PROCEDURE — 99214 OFFICE O/P EST MOD 30 MIN: CPT | Performed by: INTERNAL MEDICINE

## 2019-08-21 RX ORDER — CEFUROXIME AXETIL 500 MG/1
500 TABLET ORAL 2 TIMES DAILY
Qty: 112 TABLET | Refills: 0 | Status: ON HOLD | OUTPATIENT
Start: 2019-08-21 | End: 2019-10-16 | Stop reason: HOSPADM

## 2019-08-21 NOTE — ASSESSMENT & PLAN NOTE
Still has some tenderness on the foot, wound is healed.  Extend cefuroxime by 4 more weeks (previous 12 weeks)

## 2019-08-21 NOTE — PROGRESS NOTES
 Number of children: Not on file    Years of education: Not on file    Highest education level: Not on file   Occupational History    Not on file   Social Needs    Financial resource strain: Not on file    Food insecurity:     Worry: Not on file     Inability: Not on file    Transportation needs:     Medical: Not on file     Non-medical: Not on file   Tobacco Use    Smoking status: Never Smoker    Smokeless tobacco: Never Used   Substance and Sexual Activity    Alcohol use: No     Alcohol/week: 0.0 standard drinks    Drug use: No    Sexual activity: Not Currently   Lifestyle    Physical activity:     Days per week: Not on file     Minutes per session: Not on file    Stress: Not on file   Relationships    Social connections:     Talks on phone: Not on file     Gets together: Not on file     Attends [employer] service: Not on file     Active member of club or organization: Not on file     Attends meetings of clubs or organizations: Not on file     Relationship status: Not on file    Intimate partner violence:     Fear of current or ex partner: Not on file     Emotionally abused: Not on file     Physically abused: Not on file     Forced sexual activity: Not on file   Other Topics Concern    Not on file   Social History Narrative    Not on file       Family History   Problem Relation Age of Onset    Diabetes Mother         \"Borderline Diabetes\"    High Blood Pressure Mother     Obesity Mother     Arthritis Mother     Heart Disease Mother     High Cholesterol Mother     Vision Loss Mother     Diabetes Father     High Blood Pressure Father     Asthma Father     Cancer Father         prostate cancer    High Blood Pressure Brother     Asthma Son     Vision Loss Son     Lupus Daughter     Other Daughter         \"Alot Of Female Problems\"       Vital Signs:  Vitals:    08/21/19 1428   BP: 110/80   Site: Right Upper Arm   Position: Sitting   Cuff Size: Large Adult   Pulse: 75   SpO2: 98% Weight: 260 lb (117.9 kg)        Wt Readings from Last 3 Encounters:   08/21/19 260 lb (117.9 kg)   08/15/19 260 lb (117.9 kg)   08/07/19 260 lb 9.6 oz (118.2 kg)        Physical Exam:   Gen: alert and NAD  HEENT: sclera clear, pupils equal and reactive, extra ocular muscles intact, oropharynx clear, mucus membranes moist, tympanic membranes clear bilaterally, no cervical lymphadenopathy noted and neck supple  Neck: supple, no significant adenopathy  Chest: right anterior chest wall mediport, clear to auscultation, no wheezes, rales or rhonchi, symmetric air entry,left ACW mediport   Heart: regular rate and rhythm, no murmurs  ABD: abdomen is soft without significant tenderness, masses, organomegaly or guarding. EXT:peripheral pulses normal, no pedal edema, no clubbing or cyanosis  NEURO: alert, oriented, normal speech, no focal findings or movement disorder noted  Skin: well hydrated, no lesions, surgical site examined  Wounds: medial left foot, dependent rubor.    Labs:   WBC   Date Value Ref Range Status   08/07/2019 3.3 (L) 4.0 - 10.5 K/CU MM Final   08/05/2019 3.6 (L) 4.0 - 10.5 K/CU MM Final   07/30/2019 4.5 4.0 - 10.5 K/CU MM Final     CREATININE   Date Value Ref Range Status   08/05/2019 0.7 0.6 - 1.1 MG/DL Final   07/30/2019 0.7 0.6 - 1.1 MG/DL Final   07/23/2019 0.6 0.6 - 1.1 MG/DL Final       Cultures:  Culture   Date Value Ref Range Status   07/19/2019 NO GROWTH AT 5 DAYS  Final   07/19/2019 NO GROWTH AT 5 DAYS  Final   07/16/2019 (A)  Final    PSEUDOMONAS SP SCANTY GROWTH REFER TO ACC NO [id number] FOR ID AND SENSITIVITY   07/16/2019 NO ANAEROBIC GROWTH AT 72 HOURS  Final   07/16/2019 PSEUDOMONAS AERUGINOSA SCANTY GROWTH (A)  Final   07/16/2019 NO ANAEROBIC GROWTH AT 72 HOURS  Final   07/16/2019 (NOTE) PRINTED TO PHARMACY 7/19 1020 DD  Final       Imaging Studies:   none

## 2019-08-23 ENCOUNTER — HOSPITAL ENCOUNTER (OUTPATIENT)
Age: 49
Setting detail: OBSERVATION
Discharge: HOME | End: 2019-08-25
Attending: EMERGENCY MEDICINE | Admitting: FAMILY MEDICINE
Payer: COMMERCIAL

## 2019-08-23 DIAGNOSIS — R10.13 EPIGASTRIC PAIN: ICD-10-CM

## 2019-08-23 DIAGNOSIS — K92.0 HEMATEMESIS WITH NAUSEA: Primary | ICD-10-CM

## 2019-08-23 LAB
ALBUMIN SERPL-MCNC: 3.9 GM/DL (ref 3.4–5)
ALP BLD-CCNC: 121 IU/L (ref 40–129)
ALT SERPL-CCNC: 17 U/L (ref 10–40)
ANION GAP SERPL CALCULATED.3IONS-SCNC: 9 MMOL/L (ref 4–16)
APTT: 29.1 SECONDS (ref 21.2–33)
AST SERPL-CCNC: 20 IU/L (ref 15–37)
BACTERIA: ABNORMAL /HPF
BASOPHILS ABSOLUTE: 0 K/CU MM
BASOPHILS RELATIVE PERCENT: 0.8 % (ref 0–1)
BILIRUB SERPL-MCNC: 0.2 MG/DL (ref 0–1)
BILIRUBIN URINE: NEGATIVE MG/DL
BLOOD, URINE: NEGATIVE
BUN BLDV-MCNC: 19 MG/DL (ref 6–23)
CALCIUM SERPL-MCNC: 10.5 MG/DL (ref 8.3–10.6)
CHLORIDE BLD-SCNC: 105 MMOL/L (ref 99–110)
CLARITY: ABNORMAL
CO2: 25 MMOL/L (ref 21–32)
COLOR: YELLOW
CREAT SERPL-MCNC: 0.9 MG/DL (ref 0.6–1.1)
DIFFERENTIAL TYPE: ABNORMAL
EOSINOPHILS ABSOLUTE: 0.3 K/CU MM
EOSINOPHILS RELATIVE PERCENT: 4.9 % (ref 0–3)
GFR AFRICAN AMERICAN: >60 ML/MIN/1.73M2
GFR NON-AFRICAN AMERICAN: >60 ML/MIN/1.73M2
GLUCOSE BLD-MCNC: 96 MG/DL (ref 70–99)
GLUCOSE, URINE: NEGATIVE MG/DL
HCT VFR BLD CALC: 30 % (ref 37–47)
HEMOGLOBIN: 8.7 GM/DL (ref 12.5–16)
IMMATURE NEUTROPHIL %: 0.2 % (ref 0–0.43)
INR BLD: 0.97 INDEX
KETONES, URINE: NEGATIVE MG/DL
LEUKOCYTE ESTERASE, URINE: NEGATIVE
LIPASE: 16 IU/L (ref 13–60)
LYMPHOCYTES ABSOLUTE: 1.8 K/CU MM
LYMPHOCYTES RELATIVE PERCENT: 35.2 % (ref 24–44)
MCH RBC QN AUTO: 23.3 PG (ref 27–31)
MCHC RBC AUTO-ENTMCNC: 29 % (ref 32–36)
MCV RBC AUTO: 80.2 FL (ref 78–100)
MONOCYTES ABSOLUTE: 0.4 K/CU MM
MONOCYTES RELATIVE PERCENT: 7.8 % (ref 0–4)
MUCUS: ABNORMAL HPF
NITRITE URINE, QUANTITATIVE: NEGATIVE
NUCLEATED RBC %: 0 %
PDW BLD-RTO: 14 % (ref 11.7–14.9)
PH, URINE: 5 (ref 5–8)
PLATELET # BLD: 250 K/CU MM (ref 140–440)
PMV BLD AUTO: 10.6 FL (ref 7.5–11.1)
POTASSIUM SERPL-SCNC: 4.5 MMOL/L (ref 3.5–5.1)
PROTEIN UA: NEGATIVE MG/DL
PROTHROMBIN TIME: 11.1 SECONDS (ref 9.12–12.5)
RBC # BLD: 3.74 M/CU MM (ref 4.2–5.4)
RBC URINE: <1 /HPF (ref 0–6)
SEGMENTED NEUTROPHILS ABSOLUTE COUNT: 2.6 K/CU MM
SEGMENTED NEUTROPHILS RELATIVE PERCENT: 51.1 % (ref 36–66)
SODIUM BLD-SCNC: 139 MMOL/L (ref 135–145)
SPECIFIC GRAVITY UA: 1.02 (ref 1–1.03)
SQUAMOUS EPITHELIAL: 9 /HPF
TOTAL IMMATURE NEUTOROPHIL: 0.01 K/CU MM
TOTAL NUCLEATED RBC: 0 K/CU MM
TOTAL PROTEIN: 6.6 GM/DL (ref 6.4–8.2)
TRICHOMONAS: ABNORMAL /HPF
UROBILINOGEN, URINE: NORMAL MG/DL (ref 0.2–1)
WBC # BLD: 5.1 K/CU MM (ref 4–10.5)
WBC UA: 2 /HPF (ref 0–5)

## 2019-08-23 PROCEDURE — 96374 THER/PROPH/DIAG INJ IV PUSH: CPT

## 2019-08-23 PROCEDURE — 6370000000 HC RX 637 (ALT 250 FOR IP): Performed by: EMERGENCY MEDICINE

## 2019-08-23 PROCEDURE — 86901 BLOOD TYPING SEROLOGIC RH(D): CPT

## 2019-08-23 PROCEDURE — 81001 URINALYSIS AUTO W/SCOPE: CPT

## 2019-08-23 PROCEDURE — 86850 RBC ANTIBODY SCREEN: CPT

## 2019-08-23 PROCEDURE — 6360000002 HC RX W HCPCS: Performed by: EMERGENCY MEDICINE

## 2019-08-23 PROCEDURE — 86900 BLOOD TYPING SEROLOGIC ABO: CPT

## 2019-08-23 PROCEDURE — 83690 ASSAY OF LIPASE: CPT

## 2019-08-23 PROCEDURE — 96375 TX/PRO/DX INJ NEW DRUG ADDON: CPT

## 2019-08-23 PROCEDURE — 85730 THROMBOPLASTIN TIME PARTIAL: CPT

## 2019-08-23 PROCEDURE — 85025 COMPLETE CBC W/AUTO DIFF WBC: CPT

## 2019-08-23 PROCEDURE — 85610 PROTHROMBIN TIME: CPT

## 2019-08-23 PROCEDURE — 2580000003 HC RX 258: Performed by: EMERGENCY MEDICINE

## 2019-08-23 PROCEDURE — G0378 HOSPITAL OBSERVATION PER HR: HCPCS

## 2019-08-23 PROCEDURE — 87040 BLOOD CULTURE FOR BACTERIA: CPT

## 2019-08-23 PROCEDURE — 80053 COMPREHEN METABOLIC PANEL: CPT

## 2019-08-23 PROCEDURE — 96361 HYDRATE IV INFUSION ADD-ON: CPT

## 2019-08-23 PROCEDURE — 86922 COMPATIBILITY TEST ANTIGLOB: CPT

## 2019-08-23 PROCEDURE — 96372 THER/PROPH/DIAG INJ SC/IM: CPT

## 2019-08-23 PROCEDURE — C9113 INJ PANTOPRAZOLE SODIUM, VIA: HCPCS | Performed by: EMERGENCY MEDICINE

## 2019-08-23 PROCEDURE — 99284 EMERGENCY DEPT VISIT MOD MDM: CPT

## 2019-08-23 PROCEDURE — 2140000000 HC CCU INTERMEDIATE R&B

## 2019-08-23 RX ORDER — PANTOPRAZOLE SODIUM 40 MG/1
40 TABLET, DELAYED RELEASE ORAL
Status: DISCONTINUED | OUTPATIENT
Start: 2019-08-24 | End: 2019-08-25 | Stop reason: HOSPADM

## 2019-08-23 RX ORDER — SODIUM CHLORIDE 0.9 % (FLUSH) 0.9 %
10 SYRINGE (ML) INJECTION EVERY 12 HOURS SCHEDULED
Status: DISCONTINUED | OUTPATIENT
Start: 2019-08-23 | End: 2019-08-25 | Stop reason: HOSPADM

## 2019-08-23 RX ORDER — HYDROCODONE BITARTRATE AND ACETAMINOPHEN 7.5; 325 MG/1; MG/1
1 TABLET ORAL EVERY 6 HOURS PRN
Status: DISCONTINUED | OUTPATIENT
Start: 2019-08-23 | End: 2019-08-25 | Stop reason: HOSPADM

## 2019-08-23 RX ORDER — PANTOPRAZOLE SODIUM 40 MG/10ML
40 INJECTION, POWDER, LYOPHILIZED, FOR SOLUTION INTRAVENOUS ONCE
Status: COMPLETED | OUTPATIENT
Start: 2019-08-23 | End: 2019-08-23

## 2019-08-23 RX ORDER — SODIUM CHLORIDE 9 MG/ML
INJECTION, SOLUTION INTRAVENOUS CONTINUOUS
Status: DISCONTINUED | OUTPATIENT
Start: 2019-08-23 | End: 2019-08-25 | Stop reason: HOSPADM

## 2019-08-23 RX ORDER — 0.9 % SODIUM CHLORIDE 0.9 %
1000 INTRAVENOUS SOLUTION INTRAVENOUS ONCE
Status: COMPLETED | OUTPATIENT
Start: 2019-08-23 | End: 2019-08-23

## 2019-08-23 RX ORDER — ONDANSETRON 2 MG/ML
4 INJECTION INTRAMUSCULAR; INTRAVENOUS EVERY 6 HOURS PRN
Status: DISCONTINUED | OUTPATIENT
Start: 2019-08-23 | End: 2019-08-23 | Stop reason: SDUPTHER

## 2019-08-23 RX ORDER — ATENOLOL 25 MG/1
25 TABLET ORAL DAILY
Status: DISCONTINUED | OUTPATIENT
Start: 2019-08-24 | End: 2019-08-25 | Stop reason: HOSPADM

## 2019-08-23 RX ORDER — MORPHINE SULFATE 4 MG/ML
4 INJECTION, SOLUTION INTRAMUSCULAR; INTRAVENOUS ONCE
Status: COMPLETED | OUTPATIENT
Start: 2019-08-23 | End: 2019-08-23

## 2019-08-23 RX ORDER — PROMETHAZINE HYDROCHLORIDE 25 MG/ML
12.5 INJECTION, SOLUTION INTRAMUSCULAR; INTRAVENOUS ONCE
Status: COMPLETED | OUTPATIENT
Start: 2019-08-23 | End: 2019-08-23

## 2019-08-23 RX ORDER — GABAPENTIN 400 MG/1
800 CAPSULE ORAL 3 TIMES DAILY
Status: DISCONTINUED | OUTPATIENT
Start: 2019-08-23 | End: 2019-08-25 | Stop reason: HOSPADM

## 2019-08-23 RX ORDER — PROMETHAZINE HYDROCHLORIDE 25 MG/1
12.5 TABLET ORAL EVERY 6 HOURS PRN
Status: DISCONTINUED | OUTPATIENT
Start: 2019-08-23 | End: 2019-08-25 | Stop reason: HOSPADM

## 2019-08-23 RX ORDER — SODIUM CHLORIDE 0.9 % (FLUSH) 0.9 %
10 SYRINGE (ML) INJECTION PRN
Status: DISCONTINUED | OUTPATIENT
Start: 2019-08-23 | End: 2019-08-25 | Stop reason: HOSPADM

## 2019-08-23 RX ORDER — FERROUS SULFATE 325(65) MG
325 TABLET ORAL 2 TIMES DAILY
Status: DISCONTINUED | OUTPATIENT
Start: 2019-08-23 | End: 2019-08-25 | Stop reason: HOSPADM

## 2019-08-23 RX ORDER — FEEDER CONTAINER WITH PUMP SET
1 EACH MISCELLANEOUS
Status: DISCONTINUED | OUTPATIENT
Start: 2019-08-23 | End: 2019-08-23 | Stop reason: SDUPTHER

## 2019-08-23 RX ORDER — ONDANSETRON 2 MG/ML
4 INJECTION INTRAMUSCULAR; INTRAVENOUS EVERY 6 HOURS PRN
Status: DISCONTINUED | OUTPATIENT
Start: 2019-08-23 | End: 2019-08-25 | Stop reason: HOSPADM

## 2019-08-23 RX ORDER — PAROXETINE HYDROCHLORIDE 20 MG/1
40 TABLET, FILM COATED ORAL NIGHTLY
Status: DISCONTINUED | OUTPATIENT
Start: 2019-08-23 | End: 2019-08-25 | Stop reason: HOSPADM

## 2019-08-23 RX ORDER — M-VIT,TX,IRON,MINS/CALC/FOLIC 27MG-0.4MG
1 TABLET ORAL DAILY
Status: DISCONTINUED | OUTPATIENT
Start: 2019-08-24 | End: 2019-08-25 | Stop reason: HOSPADM

## 2019-08-23 RX ORDER — CEFUROXIME AXETIL 250 MG/1
500 TABLET ORAL 2 TIMES DAILY
Status: DISCONTINUED | OUTPATIENT
Start: 2019-08-23 | End: 2019-08-25 | Stop reason: HOSPADM

## 2019-08-23 RX ORDER — OYSTER SHELL CALCIUM WITH VITAMIN D 500; 200 MG/1; [IU]/1
1 TABLET, FILM COATED ORAL 2 TIMES DAILY
Status: DISCONTINUED | OUTPATIENT
Start: 2019-08-23 | End: 2019-08-25 | Stop reason: HOSPADM

## 2019-08-23 RX ORDER — SENNA AND DOCUSATE SODIUM 50; 8.6 MG/1; MG/1
2 TABLET, FILM COATED ORAL DAILY
Status: DISCONTINUED | OUTPATIENT
Start: 2019-08-24 | End: 2019-08-25 | Stop reason: HOSPADM

## 2019-08-23 RX ORDER — ONDANSETRON 4 MG/1
4 TABLET, ORALLY DISINTEGRATING ORAL EVERY 8 HOURS PRN
Status: DISCONTINUED | OUTPATIENT
Start: 2019-08-23 | End: 2019-08-25 | Stop reason: HOSPADM

## 2019-08-23 RX ORDER — DICYCLOMINE HCL 20 MG
20 TABLET ORAL EVERY 6 HOURS
Status: DISCONTINUED | OUTPATIENT
Start: 2019-08-23 | End: 2019-08-25 | Stop reason: HOSPADM

## 2019-08-23 RX ORDER — CLONIDINE HYDROCHLORIDE 0.1 MG/1
0.1 TABLET ORAL 2 TIMES DAILY
Status: DISCONTINUED | OUTPATIENT
Start: 2019-08-23 | End: 2019-08-25 | Stop reason: HOSPADM

## 2019-08-23 RX ORDER — TIZANIDINE 4 MG/1
4 TABLET ORAL 2 TIMES DAILY
Status: DISCONTINUED | OUTPATIENT
Start: 2019-08-23 | End: 2019-08-25 | Stop reason: HOSPADM

## 2019-08-23 RX ORDER — LEVOTHYROXINE SODIUM 0.12 MG/1
125 TABLET ORAL DAILY
Status: DISCONTINUED | OUTPATIENT
Start: 2019-08-24 | End: 2019-08-25 | Stop reason: HOSPADM

## 2019-08-23 RX ORDER — ESTRADIOL 1 MG/1
0.5 TABLET ORAL DAILY
Status: DISCONTINUED | OUTPATIENT
Start: 2019-08-24 | End: 2019-08-25 | Stop reason: HOSPADM

## 2019-08-23 RX ORDER — ACETAMINOPHEN 325 MG/1
650 TABLET ORAL ONCE
Status: COMPLETED | OUTPATIENT
Start: 2019-08-23 | End: 2019-08-23

## 2019-08-23 RX ADMIN — MORPHINE SULFATE 4 MG: 4 INJECTION, SOLUTION INTRAMUSCULAR; INTRAVENOUS at 19:10

## 2019-08-23 RX ADMIN — ACETAMINOPHEN 650 MG: 325 TABLET ORAL at 20:39

## 2019-08-23 RX ADMIN — ONDANSETRON 4 MG: 2 INJECTION INTRAMUSCULAR; INTRAVENOUS at 19:11

## 2019-08-23 RX ADMIN — SODIUM CHLORIDE 1000 ML: 9 INJECTION, SOLUTION INTRAVENOUS at 19:10

## 2019-08-23 RX ADMIN — PROMETHAZINE HYDROCHLORIDE 12.5 MG: 25 INJECTION INTRAMUSCULAR; INTRAVENOUS at 20:20

## 2019-08-23 RX ADMIN — PANTOPRAZOLE SODIUM 40 MG: 40 INJECTION, POWDER, FOR SOLUTION INTRAVENOUS at 19:10

## 2019-08-23 ASSESSMENT — PAIN DESCRIPTION - LOCATION: LOCATION: ABDOMEN;BACK

## 2019-08-23 ASSESSMENT — PAIN SCALES - GENERAL
PAINLEVEL_OUTOF10: 7

## 2019-08-23 ASSESSMENT — PAIN DESCRIPTION - ORIENTATION: ORIENTATION: RIGHT;LOWER

## 2019-08-23 ASSESSMENT — PAIN DESCRIPTION - PAIN TYPE: TYPE: ACUTE PAIN

## 2019-08-23 NOTE — ED PROVIDER NOTES
Foot\"    Teeth missing     Upper And Lower    Thyroid disease     UTI (urinary tract infection) In Past    No Current Symptoms    Wears glasses     To Read     Past Surgical History:   Procedure Laterality Date    APPENDECTOMY  02/1998    Done When Tubes And Ovaries Were Removed    CARDIAC CATHETERIZATION  10/24/2010    CATHETER REMOVAL N/A 7/16/2019    PORT REMOVAL performed by Derik Beckett MD at [place] [address]  08/27/1991    CHOLECYSTECTOMY, LAPAROSCOPIC  1990's    COLONOSCOPY  Last Done In 2000's    DENTAL SURGERY      Teeth Extracted In Past    ENDOSCOPY, COLON, DIAGNOSTIC  Last Done In 2018    FOOT DEBRIDEMENT Left 6/16/2019    FIRST METATARSAL DEBRIDEMENT INCISION AND DRAINAGE. EXCISION OF ULCER.  RESECTION OF BONE. 1ST METATARSAL POWER LAVAGE AND BONE CEMENT performed by Guillermo Felix DPM at [address] Left Last Done In 2016     Dr. Maribell López, \" About 6 Surgeries Done Because Of Staph Infection\"    HIATAL HERNIA REPAIR N/A 2/12/2019    HERNIA HIATAL LAPAROSCOPIC ROBOTIC performed by Derik Beckett MD at [facility]  10/1997    Partial Abdominal Hysterectomy    INSERTION / REMOVAL / REPLACEMENT VENOUS ACCESS CATHETER N/A 8/15/2019    PORT INSERTION performed by Derik Beckett MD at [address] [place] [address]    removed after 6 months    LUNG REMOVAL, PARTIAL Left 2008    Benign    OTHER SURGICAL HISTORY  02/1998    \"Tubes And Ovaries Removed, Appendectomy Also Done\"    OTHER SURGICAL HISTORY  Last Done 7-15-16    Mediport Insertion \"Total Of Six Done, Removed Last Mediport In 2014\"    SLEEVE GASTRECTOMY N/A 2/12/2019    GASTRECTOMY SLEEVE LAPAROSCOPIC ROBOTIC performed by Derik Beckett MD at [facility]  08/27/2018    UPPER GASTROINTESTINAL ENDOSCOPY N/A 4/2/2019    EGD CONTROL HEMORRHAGE with epi injection at bleeding site performed by Derik Beckett MD at (CATAPRES) 0.1 MG tablet Take 0.1 mg by mouth 2 times daily      Multiple Vitamin (MVI, BARIATRIC ADVANTAGE MULTI-FORMULA, CHEW TAB) Take 1 tablet by mouth daily 30 tablet 5    Calcium Citrate-Vitamin D (CALCIUM + VIT D, BARIATRIC ADVANTAGE, CHEWABLE TABLET) Take 1 tablet by mouth 2 times daily 120 tablet 5    levothyroxine (SYNTHROID) 125 MCG tablet Take 1 tablet by mouth Daily (Patient taking differently: Take 125 mcg by mouth nightly ) 30 tablet 0    gabapentin (NEURONTIN) 800 MG tablet Take 800 mg by mouth 3 times daily      PARoxetine (PAXIL) 30 MG tablet Take 40 mg by mouth nightly        Allergies   Allergen Reactions    Aspirin Palpitations     \"My Heart Rate Elevates\"    Compazine [Prochlorperazine Maleate] Rash    Reglan [Metoclopramide Hcl] Rash    Shellfish-Derived Products Swelling    Toradol [Ketorolac Tromethamine] Rash       Nursing Notes Reviewed    Physical Exam:  ED Triage Vitals [08/23/19 1736]   Enc Vitals Group      /62      Pulse 84      Resp 16      Temp 98.3 °F (36.8 °C)      Temp Source Oral      SpO2 97 %      Weight 258 lb (117 kg)      Height 5' 4\" (1.626 m)      Head Circumference       Peak Flow       Pain Score       Pain Loc       Pain Edu? Excl. in [address]? My pulse ox interpretation is - normal    General appearance:  No acute distress. Appears not to be feeling well but overall nontoxic. Pleasant. Skin:  Warm. Dry. No diaphoresis. No rash to exposed skin. Eye:  Extraocular movements intact. Ears, nose, mouth and throat:  Oral mucosa moist   Neck:  Trachea midline. Extremity:  No swelling. Normal ROM     Heart:  Regular rate and rhythm, normal S1 & S2, no extra heart sounds. Perfusion:  Intact   Respiratory:  Lungs clear to auscultation bilaterally. Respirations nonlabored. Speaking clearly in full sentences. No respiratory distress. Abdominal:  Normal bowel sounds. Soft. Mild epigastric tenderness to palpation.   Elevated BMI

## 2019-08-24 LAB
HCT VFR BLD CALC: 28.1 % (ref 37–47)
HCT VFR BLD CALC: 29.7 % (ref 37–47)
HCT VFR BLD CALC: NORMAL % (ref 37–47)
HCT VFR BLD CALC: NORMAL % (ref 37–47)
HEMOGLOBIN: 8 GM/DL (ref 12.5–16)
HEMOGLOBIN: 8.5 GM/DL (ref 12.5–16)
HEMOGLOBIN: NORMAL GM/DL (ref 12.5–16)
HEMOGLOBIN: NORMAL GM/DL (ref 12.5–16)
MCH RBC QN AUTO: 23.5 PG (ref 27–31)
MCHC RBC AUTO-ENTMCNC: 28.5 % (ref 32–36)
MCV RBC AUTO: 82.4 FL (ref 78–100)
PDW BLD-RTO: 14.1 % (ref 11.7–14.9)
PLATELET # BLD: 215 K/CU MM (ref 140–440)
PMV BLD AUTO: 11.3 FL (ref 7.5–11.1)
RBC # BLD: 3.41 M/CU MM (ref 4.2–5.4)
WBC # BLD: 3.8 K/CU MM (ref 4–10.5)

## 2019-08-24 PROCEDURE — 85018 HEMOGLOBIN: CPT

## 2019-08-24 PROCEDURE — 6360000002 HC RX W HCPCS: Performed by: FAMILY MEDICINE

## 2019-08-24 PROCEDURE — 6370000000 HC RX 637 (ALT 250 FOR IP): Performed by: FAMILY MEDICINE

## 2019-08-24 PROCEDURE — 2140000000 HC CCU INTERMEDIATE R&B

## 2019-08-24 PROCEDURE — 2580000003 HC RX 258: Performed by: FAMILY MEDICINE

## 2019-08-24 PROCEDURE — 96372 THER/PROPH/DIAG INJ SC/IM: CPT

## 2019-08-24 PROCEDURE — 96376 TX/PRO/DX INJ SAME DRUG ADON: CPT

## 2019-08-24 PROCEDURE — 85014 HEMATOCRIT: CPT

## 2019-08-24 PROCEDURE — 85027 COMPLETE CBC AUTOMATED: CPT

## 2019-08-24 PROCEDURE — G0378 HOSPITAL OBSERVATION PER HR: HCPCS

## 2019-08-24 RX ORDER — ACETAMINOPHEN 80 MG
TABLET,CHEWABLE ORAL
Status: DISPENSED
Start: 2019-08-24 | End: 2019-08-24

## 2019-08-24 RX ADMIN — ESTRADIOL 0.5 MG: 1 TABLET ORAL at 12:38

## 2019-08-24 RX ADMIN — HYDROCODONE BITARTRATE AND ACETAMINOPHEN 1 TABLET: 7.5; 325 TABLET ORAL at 00:58

## 2019-08-24 RX ADMIN — OYSTER SHELL CALCIUM WITH VITAMIN D 1 TABLET: 500; 200 TABLET, FILM COATED ORAL at 22:56

## 2019-08-24 RX ADMIN — ATENOLOL 25 MG: 25 TABLET ORAL at 12:27

## 2019-08-24 RX ADMIN — SODIUM CHLORIDE: 9 INJECTION, SOLUTION INTRAVENOUS at 00:57

## 2019-08-24 RX ADMIN — PROMETHAZINE HYDROCHLORIDE 12.5 MG: 25 TABLET ORAL at 06:46

## 2019-08-24 RX ADMIN — CEFUROXIME AXETIL 500 MG: 250 TABLET ORAL at 12:38

## 2019-08-24 RX ADMIN — GABAPENTIN 800 MG: 400 CAPSULE ORAL at 12:26

## 2019-08-24 RX ADMIN — SENNOSIDES, DOCUSATE SODIUM 2 TABLET: 50; 8.6 TABLET, FILM COATED ORAL at 12:26

## 2019-08-24 RX ADMIN — PAROXETINE HYDROCHLORIDE 40 MG: 20 TABLET, FILM COATED ORAL at 22:57

## 2019-08-24 RX ADMIN — PAROXETINE HYDROCHLORIDE 40 MG: 20 TABLET, FILM COATED ORAL at 00:58

## 2019-08-24 RX ADMIN — DICYCLOMINE HYDROCHLORIDE 20 MG: 20 TABLET ORAL at 06:46

## 2019-08-24 RX ADMIN — HYDROCODONE BITARTRATE AND ACETAMINOPHEN 1 TABLET: 7.5; 325 TABLET ORAL at 12:46

## 2019-08-24 RX ADMIN — GABAPENTIN 800 MG: 400 CAPSULE ORAL at 22:57

## 2019-08-24 RX ADMIN — PROMETHAZINE HYDROCHLORIDE 12.5 MG: 25 TABLET ORAL at 12:43

## 2019-08-24 RX ADMIN — DICYCLOMINE HYDROCHLORIDE 20 MG: 20 TABLET ORAL at 23:00

## 2019-08-24 RX ADMIN — Medication 5000 UNITS: at 12:27

## 2019-08-24 RX ADMIN — PANTOPRAZOLE SODIUM 40 MG: 40 TABLET, DELAYED RELEASE ORAL at 06:47

## 2019-08-24 RX ADMIN — FERROUS SULFATE TAB 325 MG (65 MG ELEMENTAL FE) 325 MG: 325 (65 FE) TAB at 12:29

## 2019-08-24 RX ADMIN — CEFUROXIME AXETIL 500 MG: 250 TABLET ORAL at 22:57

## 2019-08-24 RX ADMIN — MULTIPLE VITAMINS W/ MINERALS TAB 1 TABLET: TAB at 12:29

## 2019-08-24 RX ADMIN — FERROUS SULFATE TAB 325 MG (65 MG ELEMENTAL FE) 325 MG: 325 (65 FE) TAB at 00:58

## 2019-08-24 RX ADMIN — TIZANIDINE 4 MG: 4 TABLET ORAL at 22:56

## 2019-08-24 RX ADMIN — LEVOTHYROXINE SODIUM 125 MCG: 125 TABLET ORAL at 06:47

## 2019-08-24 RX ADMIN — TIZANIDINE 4 MG: 4 TABLET ORAL at 12:28

## 2019-08-24 RX ADMIN — Medication 10 ML: at 00:59

## 2019-08-24 RX ADMIN — GABAPENTIN 800 MG: 400 CAPSULE ORAL at 00:57

## 2019-08-24 RX ADMIN — HYDROCODONE BITARTRATE AND ACETAMINOPHEN 1 TABLET: 7.5; 325 TABLET ORAL at 22:57

## 2019-08-24 RX ADMIN — PROMETHAZINE HYDROCHLORIDE 12.5 MG: 25 TABLET ORAL at 22:57

## 2019-08-24 RX ADMIN — ENOXAPARIN SODIUM 40 MG: 40 INJECTION SUBCUTANEOUS at 12:29

## 2019-08-24 RX ADMIN — DICYCLOMINE HYDROCHLORIDE 20 MG: 20 TABLET ORAL at 17:43

## 2019-08-24 RX ADMIN — FERROUS SULFATE TAB 325 MG (65 MG ELEMENTAL FE) 325 MG: 325 (65 FE) TAB at 22:56

## 2019-08-24 RX ADMIN — TIZANIDINE 4 MG: 4 TABLET ORAL at 01:07

## 2019-08-24 RX ADMIN — HYDROCODONE BITARTRATE AND ACETAMINOPHEN 1 TABLET: 7.5; 325 TABLET ORAL at 06:46

## 2019-08-24 RX ADMIN — ONDANSETRON 4 MG: 2 INJECTION INTRAMUSCULAR; INTRAVENOUS at 21:27

## 2019-08-24 RX ADMIN — DICYCLOMINE HYDROCHLORIDE 20 MG: 20 TABLET ORAL at 00:57

## 2019-08-24 RX ADMIN — SODIUM CHLORIDE: 9 INJECTION, SOLUTION INTRAVENOUS at 22:55

## 2019-08-24 RX ADMIN — GABAPENTIN 800 MG: 400 CAPSULE ORAL at 15:44

## 2019-08-24 RX ADMIN — DICYCLOMINE HYDROCHLORIDE 20 MG: 20 TABLET ORAL at 12:27

## 2019-08-24 RX ADMIN — CEFUROXIME AXETIL 500 MG: 250 TABLET ORAL at 00:56

## 2019-08-24 RX ADMIN — PROMETHAZINE HYDROCHLORIDE 12.5 MG: 25 TABLET ORAL at 00:58

## 2019-08-24 RX ADMIN — CLONIDINE HYDROCHLORIDE 0.1 MG: 0.1 TABLET ORAL at 22:56

## 2019-08-24 RX ADMIN — OYSTER SHELL CALCIUM WITH VITAMIN D 1 TABLET: 500; 200 TABLET, FILM COATED ORAL at 00:57

## 2019-08-24 ASSESSMENT — PAIN SCALES - GENERAL
PAINLEVEL_OUTOF10: 7
PAINLEVEL_OUTOF10: 7
PAINLEVEL_OUTOF10: 5
PAINLEVEL_OUTOF10: 7
PAINLEVEL_OUTOF10: 5
PAINLEVEL_OUTOF10: 5

## 2019-08-24 ASSESSMENT — PAIN DESCRIPTION - PAIN TYPE: TYPE: CHRONIC PAIN

## 2019-08-24 ASSESSMENT — PAIN DESCRIPTION - ORIENTATION: ORIENTATION: LOWER;MID

## 2019-08-24 ASSESSMENT — PAIN DESCRIPTION - LOCATION: LOCATION: BACK

## 2019-08-24 ASSESSMENT — PAIN DESCRIPTION - FREQUENCY: FREQUENCY: CONTINUOUS

## 2019-08-24 ASSESSMENT — PAIN DESCRIPTION - ONSET: ONSET: ON-GOING

## 2019-08-24 NOTE — CONSULTS
REGLAN,  SHELLFISH, DAIRY PRODUCTS, and TORADOL. PHYSICAL EXAMINATION:  GENERAL:  Shows a 47-year-old white female who is obese, lying flat in  bed, in no acute distress. She is awake, alert, and oriented and  pleasant to talk with. VITAL SIGNS:  Stable. HEENT:  Shows skull to be atraumatic. NECK:  Supple. CHEST:  Clear. HEART:  S1 and S2 are normal.  ABDOMEN:  Soft, nontender, and nondistended. Liver and spleen are not  palpable. Bowel sounds are present. RECTAL:  Deferred. CNS:  Shows the patient to be awake, alert, and oriented. There are no  focal sensorimotor signs. MUSCULOSKELETAL SYSTEM:  Unremarkable. LABORATORY DATA:  The labs drawn during the present hospitalization  comprised a chem profile, LFTs, and CBC, which was unremarkable apart  from hemoglobin of 8.7, which has juan stable. Today, the patient's  hemoglobin is 8 gm/dL. The patient's INR is 0.97. IMPRESSION:  A 47-year-old white female with multiple comorbidities,  presents with mild upper abdominal pain and recurrent hematemesis with  stable H and H. Godfrey out upper GI bleeding secondary to gastritis. RECOMMENDATIONS:  1. Agree with present management with IV fluids along with IV Protonix. 2.  We will monitor the patient's H and H and transfuse on a p.r.n.  basis to keep hemoglobin above 7 gm/dL. 3.  Since the patient is clinically stable with no evidence of recurrent  hematemesis and the patient has had multiple EGDs done in the recent  past, the last one on 07/22/2019, we will hold off on repeat EGD at this  point. 4.  The patient wants to go home today and has been requested to follow  up with her primary care physician, Dr. Guillermo Solitario, the surgical  consultant, Dr. Akbar Mancini, and myself as outpatient. 5.  We will advance diet as tolerated.         Jacqui Bird MD    D: 08/24/2019 10:14:22       T: 08/24/2019 14:30:37     [email or web address]  Job#: [id number]     Doc#: [id number]    CC:  MD Darline Woods

## 2019-08-24 NOTE — H&P
Outpatient Medications on File Prior to Encounter   Medication Sig Dispense Refill    cefUROXime (CEFTIN) 500 MG tablet Take 1 tablet by mouth 2 times daily Start on 7/29/19 112 tablet 0    senna-docusate (SENOKOT S) 8.6-50 MG per tablet Take 2 tablets by mouth daily for 10 days 60 tablet 3    Nutritional Supplements (ENSURE HIGH PROTEIN) LIQD Take 1 Can by mouth Daily with lunch 32 Can 3    ondansetron (ZOFRAN ODT) 4 MG disintegrating tablet Take 1 tablet by mouth every 8 hours as needed for Nausea or Vomiting 30 tablet 2    promethazine (PHENERGAN) 25 MG tablet Take 0.5 tablets by mouth every 6 hours as needed for Nausea 20 tablet 0    pantoprazole (PROTONIX) 20 MG tablet Take 2 tablets by mouth 2 times daily 60 tablet 5    Cholecalciferol (VITAMIN D3) 5000 units TABS Take 1 tablet by mouth daily      ferrous sulfate (FE TABS) 325 (65 Fe) MG EC tablet Take 1 tablet by mouth 2 times daily 60 tablet 5    tiZANidine (ZANAFLEX) 4 MG tablet Take 4 mg by mouth 2 times daily      HYDROcodone-acetaminophen (NORCO) 7.5-325 MG per tablet Take 1 tablet by mouth every 6 hours as needed for Pain.        estradiol (ESTRACE) 0.5 MG tablet Take 0.5 mg by mouth daily      dicyclomine (BENTYL) 20 MG tablet Take 20 mg by mouth every 6 hours      atenolol (TENORMIN) 25 MG tablet Take 25 mg by mouth daily      cloNIDine (CATAPRES) 0.1 MG tablet Take 0.1 mg by mouth 2 times daily      Multiple Vitamin (MVI, BARIATRIC ADVANTAGE MULTI-FORMULA, CHEW TAB) Take 1 tablet by mouth daily 30 tablet 5    Calcium Citrate-Vitamin D (CALCIUM + VIT D, BARIATRIC ADVANTAGE, CHEWABLE TABLET) Take 1 tablet by mouth 2 times daily 120 tablet 5    levothyroxine (SYNTHROID) 125 MCG tablet Take 1 tablet by mouth Daily (Patient taking differently: Take 125 mcg by mouth nightly ) 30 tablet 0    gabapentin (NEURONTIN) 800 MG tablet Take 800 mg by mouth 3 times daily      PARoxetine (PAXIL) 30 MG tablet Take 40 mg by mouth nightly Allergies: Allergies   Allergen Reactions    Aspirin Palpitations     \"My Heart Rate Elevates\"    Compazine [Prochlorperazine Maleate] Rash    Reglan [Metoclopramide Hcl] Rash    Shellfish-Derived Products Swelling    Toradol [Ketorolac Tromethamine] Rash        Social History:   reports that she has never smoked. She has never used smokeless tobacco. She reports that she does not drink alcohol or use drugs. Family History:  family history includes Arthritis in her mother; Asthma in her father and son; Cancer in her father; Diabetes in her father and mother; Heart Disease in her mother; High Blood Pressure in her brother, father, and mother; High Cholesterol in her mother; Lupus in her daughter; Obesity in her mother; Other in her daughter; Vision Loss in her mother and son. ,     Physical Exam:  /65   Pulse 58   Temp 98.5 °F (36.9 °C) (Oral)   Resp 18   Ht 5' 4\" (1.626 m)   Wt 258 lb (117 kg)   SpO2 93%   BMI 44.29 kg/m²     General appearance:  Appears comfortable. Well nourished  Eyes: Sclera clear, pupils equal  ENT: Moist mucus membranes, no thrush. Trachea midline. Cardiovascular: Regular rhythm, normal S1, S2. No murmur, gallop, rub. No edema in lower extremities  Respiratory: Clear to auscultation bilaterally, no wheeze, good inspiratory effort  Gastrointestinal: Abdomen soft, non-tender, not distended, normal bowel sounds  Musculoskeletal: No cyanosis in digits, neck supple  Neurology: Cranial nerves grossly intact. Alert and oriented in time, place and person. No speech or motor deficits  Psychiatry: Appropriate affect. Not agitated  Skin: Warm, dry, normal turgor, no rash    Labs:  CBC:   Lab Results   Component Value Date    WBC 3.8 08/24/2019    RBC 3.41 08/24/2019    HGB 8.0 08/24/2019    HGB ?   WANTED A CBC   08/24/2019    HGB  08/24/2019     CORRECTED ON 08/24 AT 1356: PREVIOUSLY REPORTED AS: 8.0    HCT 28.1 08/24/2019    HCT ?  WANTED A CBC   08/24/2019    HCT 08/24/2019     CORRECTED ON 08/24 AT 1356: PREVIOUSLY REPORTED AS: 27.9    MCV 82.4 08/24/2019    MCH 23.5 08/24/2019    MCHC 28.5 08/24/2019    RDW 14.1 08/24/2019     08/24/2019    MPV 11.3 08/24/2019     BMP:    Lab Results   Component Value Date     08/23/2019    K 4.5 08/23/2019     08/23/2019    CO2 25 08/23/2019    BUN 19 08/23/2019    CREATININE 0.9 08/23/2019    CALCIUM 10.5 08/23/2019    GFRAA >60 08/23/2019    LABGLOM >60 08/23/2019    GLUCOSE 96 08/23/2019       Chest Xray:   EKG:    I visualized CXR images and EKG strips      Patient Active Problem List   Diagnosis Code    Rectal bleeding K62.5    Fever R50.9    Upper GI bleed K92.2    Fibromyalgia M79.7    Lupus (systemic lupus erythematosus) ([facility]) M32.9    Nausea & vomiting R11.2    Chest pain R07.9    Chronic pancreatitis ([facility]) K86.1    HTN (hypertension) I10    Acute pancreatitis K85.90    Right-sided chest pain R07.9    Super obesity E66.9    Anemia D64.9    Hypokalemia E87.6    Generalized abdominal pain R10.84    Pneumonia of both lungs due to infectious organism J18.9    Foot ulcer, left ([facility]) L97.529    SLE (systemic lupus erythematosus related syndrome) ([facility]) M32.9    Hypoxia R09.02    Cellulitis L03.90    Pancytopenia ([facility]) D61.818    Pleurisy R09.1    Frequent UTI N39.0    Gastroesophageal reflux disease without esophagitis K21.9    MRSA cellulitis of left foot L03.116, B95.62    Depression F32.9    Fatty liver K76.0    Fatty liver disease, nonalcoholic A75.7    Arthritis M19.90    Bilateral low back pain with left-sided sciatica M54.42    Morbid obesity with BMI of 50.0-59.9, adult ([facility]) E66.01, Z68.43    Intractable vomiting with nausea R11.2    Class 3 obesity in adult [id number]    Hiatal hernia K44.9    Poor intravenous access Z78.9    Post-operative nausea and vomiting R11.2, Z98.890    Status post bariatric surgery Z98.84    Status post laparoscopic sleeve gastrectomy Z98.84   

## 2019-08-25 VITALS
BODY MASS INDEX: 44.05 KG/M2 | WEIGHT: 258 LBS | RESPIRATION RATE: 18 BRPM | HEIGHT: 64 IN | HEART RATE: 69 BPM | SYSTOLIC BLOOD PRESSURE: 103 MMHG | OXYGEN SATURATION: 96 % | TEMPERATURE: 98.8 F | DIASTOLIC BLOOD PRESSURE: 46 MMHG

## 2019-08-25 LAB
HCT VFR BLD CALC: 27.8 % (ref 37–47)
HEMOGLOBIN: 8 GM/DL (ref 12.5–16)
MCH RBC QN AUTO: 23.5 PG (ref 27–31)
MCHC RBC AUTO-ENTMCNC: 28.8 % (ref 32–36)
MCV RBC AUTO: 81.8 FL (ref 78–100)
PDW BLD-RTO: 14.2 % (ref 11.7–14.9)
PLATELET # BLD: 215 K/CU MM (ref 140–440)
PMV BLD AUTO: 10.6 FL (ref 7.5–11.1)
RBC # BLD: 3.4 M/CU MM (ref 4.2–5.4)
WBC # BLD: 4.6 K/CU MM (ref 4–10.5)

## 2019-08-25 PROCEDURE — 6370000000 HC RX 637 (ALT 250 FOR IP): Performed by: FAMILY MEDICINE

## 2019-08-25 PROCEDURE — 6360000002 HC RX W HCPCS: Performed by: FAMILY MEDICINE

## 2019-08-25 PROCEDURE — G0378 HOSPITAL OBSERVATION PER HR: HCPCS

## 2019-08-25 PROCEDURE — 2580000003 HC RX 258: Performed by: FAMILY MEDICINE

## 2019-08-25 PROCEDURE — 85027 COMPLETE CBC AUTOMATED: CPT

## 2019-08-25 RX ORDER — HEPARIN SODIUM 1000 [USP'U]/ML
1000 INJECTION, SOLUTION INTRAVENOUS; SUBCUTANEOUS ONCE
Status: COMPLETED | OUTPATIENT
Start: 2019-08-25 | End: 2019-08-25

## 2019-08-25 RX ADMIN — SODIUM CHLORIDE: 9 INJECTION, SOLUTION INTRAVENOUS at 10:05

## 2019-08-25 RX ADMIN — ESTRADIOL 0.5 MG: 1 TABLET ORAL at 09:08

## 2019-08-25 RX ADMIN — ATENOLOL 25 MG: 25 TABLET ORAL at 08:49

## 2019-08-25 RX ADMIN — HYDROCODONE BITARTRATE AND ACETAMINOPHEN 1 TABLET: 7.5; 325 TABLET ORAL at 13:07

## 2019-08-25 RX ADMIN — SENNOSIDES, DOCUSATE SODIUM 2 TABLET: 50; 8.6 TABLET, FILM COATED ORAL at 08:50

## 2019-08-25 RX ADMIN — PROMETHAZINE HYDROCHLORIDE 12.5 MG: 25 TABLET ORAL at 06:17

## 2019-08-25 RX ADMIN — PROMETHAZINE HYDROCHLORIDE 12.5 MG: 25 TABLET ORAL at 13:07

## 2019-08-25 RX ADMIN — DICYCLOMINE HYDROCHLORIDE 20 MG: 20 TABLET ORAL at 13:07

## 2019-08-25 RX ADMIN — Medication 5000 UNITS: at 08:48

## 2019-08-25 RX ADMIN — GABAPENTIN 800 MG: 400 CAPSULE ORAL at 08:50

## 2019-08-25 RX ADMIN — GABAPENTIN 800 MG: 400 CAPSULE ORAL at 13:11

## 2019-08-25 RX ADMIN — HYDROCODONE BITARTRATE AND ACETAMINOPHEN 1 TABLET: 7.5; 325 TABLET ORAL at 06:17

## 2019-08-25 RX ADMIN — DICYCLOMINE HYDROCHLORIDE 20 MG: 20 TABLET ORAL at 06:18

## 2019-08-25 RX ADMIN — HEPARIN SODIUM 1000 UNITS: 1000 INJECTION INTRAVENOUS; SUBCUTANEOUS at 14:56

## 2019-08-25 RX ADMIN — PANTOPRAZOLE SODIUM 40 MG: 40 TABLET, DELAYED RELEASE ORAL at 06:18

## 2019-08-25 RX ADMIN — LEVOTHYROXINE SODIUM 125 MCG: 125 TABLET ORAL at 06:18

## 2019-08-25 RX ADMIN — MULTIPLE VITAMINS W/ MINERALS TAB 1 TABLET: TAB at 08:48

## 2019-08-25 RX ADMIN — CEFUROXIME AXETIL 500 MG: 250 TABLET ORAL at 09:09

## 2019-08-25 RX ADMIN — Medication 10 ML: at 10:07

## 2019-08-25 RX ADMIN — OYSTER SHELL CALCIUM WITH VITAMIN D 1 TABLET: 500; 200 TABLET, FILM COATED ORAL at 09:08

## 2019-08-25 RX ADMIN — FERROUS SULFATE TAB 325 MG (65 MG ELEMENTAL FE) 325 MG: 325 (65 FE) TAB at 08:49

## 2019-08-25 RX ADMIN — TIZANIDINE 4 MG: 4 TABLET ORAL at 08:49

## 2019-08-25 ASSESSMENT — PAIN SCALES - GENERAL
PAINLEVEL_OUTOF10: 5
PAINLEVEL_OUTOF10: 6
PAINLEVEL_OUTOF10: 4
PAINLEVEL_OUTOF10: 6

## 2019-08-25 ASSESSMENT — PAIN DESCRIPTION - ONSET
ONSET: ON-GOING

## 2019-08-25 ASSESSMENT — PAIN DESCRIPTION - LOCATION
LOCATION: ABDOMEN;BACK
LOCATION: ABDOMEN;BACK
LOCATION: ABDOMEN

## 2019-08-25 ASSESSMENT — PAIN DESCRIPTION - DESCRIPTORS
DESCRIPTORS: ACHING
DESCRIPTORS: DULL;SHARP
DESCRIPTORS: ACHING

## 2019-08-25 ASSESSMENT — PAIN DESCRIPTION - PROGRESSION
CLINICAL_PROGRESSION: GRADUALLY IMPROVING
CLINICAL_PROGRESSION: GRADUALLY WORSENING
CLINICAL_PROGRESSION: NOT CHANGED

## 2019-08-25 ASSESSMENT — PAIN DESCRIPTION - PAIN TYPE
TYPE: CHRONIC PAIN

## 2019-08-25 ASSESSMENT — PAIN - FUNCTIONAL ASSESSMENT
PAIN_FUNCTIONAL_ASSESSMENT: ACTIVITIES ARE NOT PREVENTED

## 2019-08-25 ASSESSMENT — PAIN DESCRIPTION - FREQUENCY
FREQUENCY: CONTINUOUS

## 2019-08-25 ASSESSMENT — PAIN DESCRIPTION - ORIENTATION: ORIENTATION: RIGHT

## 2019-08-25 NOTE — PROGRESS NOTES
Messaged Dr Brenda Rogers per pt's request for IV pain meds and IV phenergan. Pt states she \"can't tolerate meds by mouth\" due to her nausea and \"they don't help her. \" Pt's pain and nausea meds will remain PO at this time.

## 2019-08-25 NOTE — PROGRESS NOTES
Messaged Dr Eladia Menchaca per pt's request for IV pain meds and IV phenergan. Pt states she \"can't tolerate meds by mouth\" due to her nausea and \"they don't help her. \" Pt's pain and nausea meds will remain PO at this time.

## 2019-08-25 NOTE — DISCHARGE SUMMARY
Patient: Ana María Rodriguez MD      Gender: female  : 1970   Age: 46 y.o. MRN: [id number]    Admitting Physician: Dhiraj Moreno MD  Discharge Physician: Dhiraj Moreno MD     Code Status: Prior     Admit Date: 2019   Discharge Date: 19      Disposition:  Home      Condition at Discharge:  Stable    Follow-up appointments:  f/u one week with PCP , and with consultants as recommended . Outpatient to do list: f/u hemoglobin level       Discharge Diagnoses: Active Hospital Problems    Diagnosis Date Noted    Hematemesis with nausea [K92.0] 2019    Upper GI bleed [K92.2] 2013   Anemia   Bacteremia       History of Present Illness:    Andre Pope is a 46 y.o. 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 Pt  presented to ER for nausea and vomiting ,on and off in nature and it got worse mostly around two day  and she has episodes of hematemesis associated with abdominal pain at RUQ and diarrhea for almost a week , diarrhea subsided 3 days ago . Pt also stated that she has fever 102 for last 2 days and in           Hospital Course:   Pt was admitted on GI bleed pathway , seen by GI , and placed on clear liquid diet , IV Protonix with stable H/H level on monitoring , and pt discharged home . During hospital course ABX cont`ed as pt has h/o bacteremia with pseudomonus  . Consults.   IP CONSULT TO GI  IP CONSULT TO PRIMARY CARE PROVIDER  IP CONSULT TO GI        Discharge Medications:   Discharge Medication List as by mouth 2 times daily, Disp-120 tablet, R-5Normal      levothyroxine (SYNTHROID) 125 MCG tablet Take 1 tablet by mouth Daily, Disp-30 tablet, R-0Print      gabapentin (NEURONTIN) 800 MG tablet Take 800 mg by mouth 3 times daily      PARoxetine (PAXIL) 30 MG tablet Take 40 mg by mouth nightly Historical Med           Discharge Medication List as of 8/25/2019  1:59 PM          Discharge ROS:  A complete review of systems was asked and negative except for abdominal discomfort . Discharge Exam:    BP (!) 103/46   Pulse 69   Temp 98.8 °F (37.1 °C) (Oral)   Resp 18   Ht 5' 4\" (1.626 m)   Wt 258 lb (117 kg)   SpO2 96%   BMI 44.29 kg/m²   General appearance:  NAD  Heart[de-identified] Normal s1/s2, RRR, no murmurs, gallops, or rubs. Trace  leg edema  Lungs:  Clear to auscultation, bilaterally without Rales/Wheezes/Rhonchi. Abdomen: Soft, non-tender, non-distended, bowel sounds present  Musculoskeletal:   no cyanosis, trace edema  Neurologic:  Cranial nerves: II-XII intact, grossly non-focal.  Psychiatric:  A & O x3      Labs: For convenience and continuity at follow-up the following most recent labs are provided:    Lab Results   Component Value Date    WBC 4.6 08/25/2019    HGB 8.0 08/25/2019    HCT 27.8 08/25/2019    MCV 81.8 08/25/2019     08/25/2019     08/23/2019    K 4.5 08/23/2019     08/23/2019    CO2 25 08/23/2019    BUN 19 08/23/2019    CREATININE 0.9 08/23/2019    CALCIUM 10.5 08/23/2019    PHOS 4.4 12/04/2015    BNP 88 11/26/2010    ALKPHOS 121 08/23/2019    ALT 17 08/23/2019    AST 20 08/23/2019    BILITOT 0.2 08/23/2019    BILIDIR 0.2 01/02/2016    LABALBU 3.9 08/23/2019    LABALBU 8 11/18/2015    TRIG 161 07/23/2015     Lab Results   Component Value Date    INR 0.97 08/23/2019    INR 0.97 07/23/2019    INR 1.04 07/21/2019           Chart review shows recent radiographs:  Fl Less Than 1 Hour    Result Date: 8/15/2019  Radiology exam is complete. No Radiologist dictation.  Please follow up

## 2019-08-25 NOTE — FLOWSHEET NOTE
Discharge instructions given, AVS printed, signed by RN and patient. Copy given to patient. Patient verbalized understanding. Mediport de-accessed per [employer]. All telemetry off. Patient dressed and showered independently.      Mariano Taylor RN 2:57 PM

## 2019-08-27 LAB
ABO/RH: NORMAL
ANTIBODY SCREEN: NEGATIVE
COMPONENT: NORMAL
COMPONENT: NORMAL
CROSSMATCH RESULT: NORMAL
CROSSMATCH RESULT: NORMAL
STATUS: NORMAL
STATUS: NORMAL
TRANSFUSION STATUS: NORMAL
TRANSFUSION STATUS: NORMAL
UNIT DIVISION: 0
UNIT DIVISION: 0
UNIT NUMBER: NORMAL
UNIT NUMBER: NORMAL

## 2019-09-04 ENCOUNTER — HOSPITAL ENCOUNTER (OUTPATIENT)
Age: 49
Setting detail: SPECIMEN
Discharge: HOME | End: 2019-09-04
Payer: COMMERCIAL

## 2019-09-04 PROBLEM — K92.0 HEMATEMESIS WITH NAUSEA: Status: ACTIVE | Noted: 2019-09-04

## 2019-09-04 LAB
FERRITIN: 10 NG/ML (ref 15–150)
FOLATE: 7.8 NG/ML (ref 3.1–17.5)
IRON: 32 UG/DL (ref 37–145)
PCT TRANSFERRIN: 8 % (ref 10–44)
TOTAL IRON BINDING CAPACITY: 405 UG/DL (ref 250–450)
UNSATURATED IRON BINDING CAPACITY: 373 UG/DL (ref 110–370)
VITAMIN B-12: 327.5 PG/ML (ref 211–911)

## 2019-09-04 PROCEDURE — 82607 VITAMIN B-12: CPT

## 2019-09-04 PROCEDURE — 82728 ASSAY OF FERRITIN: CPT

## 2019-09-04 PROCEDURE — 83550 IRON BINDING TEST: CPT

## 2019-09-04 PROCEDURE — 82746 ASSAY OF FOLIC ACID SERUM: CPT

## 2019-09-04 PROCEDURE — 83540 ASSAY OF IRON: CPT

## 2019-09-23 ENCOUNTER — TELEPHONE (OUTPATIENT)
Dept: BARIATRICS/WEIGHT MGMT | Age: 49
End: 2019-09-23

## 2019-09-23 RX ORDER — PROMETHAZINE HYDROCHLORIDE 25 MG/1
12.5 TABLET ORAL EVERY 6 HOURS PRN
Qty: 20 TABLET | Refills: 0 | Status: CANCELLED | OUTPATIENT
Start: 2019-09-23

## 2019-09-25 RX ORDER — PROMETHAZINE HYDROCHLORIDE 25 MG/1
12.5 TABLET ORAL EVERY 6 HOURS PRN
Qty: 35 TABLET | Refills: 3 | Status: SHIPPED | OUTPATIENT
Start: 2019-09-25 | End: 2019-10-31

## 2019-09-27 ENCOUNTER — OFFICE VISIT (OUTPATIENT)
Dept: BARIATRICS/WEIGHT MGMT | Age: 49
End: 2019-09-27

## 2019-09-27 VITALS
WEIGHT: 257.2 LBS | HEART RATE: 70 BPM | DIASTOLIC BLOOD PRESSURE: 74 MMHG | SYSTOLIC BLOOD PRESSURE: 122 MMHG | HEIGHT: 64 IN | BODY MASS INDEX: 43.91 KG/M2

## 2019-09-27 DIAGNOSIS — D50.8 OTHER IRON DEFICIENCY ANEMIA: Primary | ICD-10-CM

## 2019-09-27 PROCEDURE — 99024 POSTOP FOLLOW-UP VISIT: CPT | Performed by: SURGERY

## 2019-09-27 NOTE — PROGRESS NOTES
GENERAL SURGERY OFFICE NOTE    SUBJECTIVE:    Patient presenting today referred from Lavon Cordero MD and No ref. provider found, for   Chief Complaint   Patient presents with    Post-Op Check     1st P/O  Mediport Placement @ [facility] on 8/15/19        HPI: Kimmie Case is a 46 y.o. female presenting after her bariatric procedure, and Mediport, and on Abx in the middle of October, Doing very well with the inches loss, lost 2 # in her pants sizes, right hand. Wounds very nicely healing, no erythema, no induration, no purulent discharge. No constipation, BMs back to normal.    Thoroughly reviewed the patient's medical history, family history, social history and review of systems with the patient today in the office. Please see medical record for pertinent positives. Past Medical History:   Diagnosis Date    Acid reflux     Anemia     Anxiety     Arthritis     Hands, Back And Ankles    Bleeding ulcer 2014    \"I Had Ulcers In My Stomach And Colon\"/ per pt on 8/12/2019\"they said recently having some blood in my stomach- in July ( 2019)could not find where coming from \"    Bronchitis Last Episode 2014    Chronic back pain     Chronic pain     Sees Dr. Sam Campbell COPD (chronic obstructive pulmonary disease) ([facility] [address]     Sees Dr. Nirmal Arceo Depression     Fibromyalgia Dx 2013    H/O echocardiogram 8/11/15    EF >55%. LA to be at the upper limit of normal in size. LV hypertrophy with normal LV systolic, but abnormal diastolic function. Normal valvular structures and function.  H/O echocardiogram 08/30/2018    EF 55-60%    Hiatal hernia     History of blood transfusion 09/2015 And 2018    No Reaction To Blood Transfusions Received    [place] (hard of hearing)     Right Ear    Hx of cardiac catheterization 10/24/2010    Angiographically normal coronary arteries w/ normal LV function and wall motion.     Hx of transesophageal echocardiography (PILO) for monitoring

## 2019-09-29 ENCOUNTER — HOSPITAL ENCOUNTER (INPATIENT)
Age: 49
LOS: 4 days | Discharge: HOME | DRG: 378 | End: 2019-10-03
Attending: EMERGENCY MEDICINE | Admitting: FAMILY MEDICINE
Payer: COMMERCIAL

## 2019-09-29 DIAGNOSIS — K08.89 PAIN, DENTAL: ICD-10-CM

## 2019-09-29 DIAGNOSIS — R10.13 EPIGASTRIC PAIN: ICD-10-CM

## 2019-09-29 DIAGNOSIS — G40.409 TONIC CLONIC CONVULSION (HCC): ICD-10-CM

## 2019-09-29 DIAGNOSIS — K92.0 HEMATEMESIS WITH NAUSEA: Primary | ICD-10-CM

## 2019-09-29 LAB
ALBUMIN SERPL-MCNC: 3.8 GM/DL (ref 3.4–5)
ALP BLD-CCNC: 116 IU/L (ref 40–129)
ALT SERPL-CCNC: 14 U/L (ref 10–40)
ANION GAP SERPL CALCULATED.3IONS-SCNC: 7 MMOL/L (ref 4–16)
AST SERPL-CCNC: 18 IU/L (ref 15–37)
BACTERIA: ABNORMAL /HPF
BASOPHILS ABSOLUTE: 0 K/CU MM
BASOPHILS RELATIVE PERCENT: 0.6 % (ref 0–1)
BILIRUB SERPL-MCNC: 0.1 MG/DL (ref 0–1)
BILIRUBIN URINE: ABNORMAL MG/DL
BLOOD, URINE: ABNORMAL
BUN BLDV-MCNC: 18 MG/DL (ref 6–23)
CALCIUM OXALATE CRYSTALS: ABNORMAL /HPF
CALCIUM SERPL-MCNC: 9.8 MG/DL (ref 8.3–10.6)
CHLORIDE BLD-SCNC: 106 MMOL/L (ref 99–110)
CLARITY: ABNORMAL
CO2: 26 MMOL/L (ref 21–32)
COLOR: YELLOW
CREAT SERPL-MCNC: 0.7 MG/DL (ref 0.6–1.1)
DIFFERENTIAL TYPE: ABNORMAL
EOSINOPHILS ABSOLUTE: 0.3 K/CU MM
EOSINOPHILS RELATIVE PERCENT: 5.1 % (ref 0–3)
GFR AFRICAN AMERICAN: >60 ML/MIN/1.73M2
GFR NON-AFRICAN AMERICAN: >60 ML/MIN/1.73M2
GLUCOSE BLD-MCNC: 97 MG/DL (ref 70–99)
GLUCOSE, URINE: NEGATIVE MG/DL
HCT VFR BLD CALC: 28.4 % (ref 37–47)
HCT VFR BLD CALC: 29.7 % (ref 37–47)
HEMOGLOBIN: 8.2 GM/DL (ref 12.5–16)
HEMOGLOBIN: 8.6 GM/DL (ref 12.5–16)
ICTOTEST: NEGATIVE
IMMATURE NEUTROPHIL %: 0.2 % (ref 0–0.43)
KETONES, URINE: NEGATIVE MG/DL
LACTATE: 0.8 MMOL/L (ref 0.4–2)
LEUKOCYTE ESTERASE, URINE: ABNORMAL
LIPASE: 15 IU/L (ref 13–60)
LYMPHOCYTES ABSOLUTE: 1.8 K/CU MM
LYMPHOCYTES RELATIVE PERCENT: 37.2 % (ref 24–44)
MCH RBC QN AUTO: 22.7 PG (ref 27–31)
MCHC RBC AUTO-ENTMCNC: 28.9 % (ref 32–36)
MCV RBC AUTO: 78.5 FL (ref 78–100)
MONOCYTES ABSOLUTE: 0.4 K/CU MM
MONOCYTES RELATIVE PERCENT: 8.4 % (ref 0–4)
MUCUS: ABNORMAL HPF
NITRITE URINE, QUANTITATIVE: NEGATIVE
NUCLEATED RBC %: 0 %
PDW BLD-RTO: 14.6 % (ref 11.7–14.9)
PH, URINE: 5 (ref 5–8)
PLATELET # BLD: 229 K/CU MM (ref 140–440)
PMV BLD AUTO: 10.5 FL (ref 7.5–11.1)
POTASSIUM SERPL-SCNC: 4.5 MMOL/L (ref 3.5–5.1)
PROTEIN UA: NEGATIVE MG/DL
RBC # BLD: 3.62 M/CU MM (ref 4.2–5.4)
RBC URINE: 1 /HPF (ref 0–6)
SEGMENTED NEUTROPHILS ABSOLUTE COUNT: 2.4 K/CU MM
SEGMENTED NEUTROPHILS RELATIVE PERCENT: 48.5 % (ref 36–66)
SODIUM BLD-SCNC: 139 MMOL/L (ref 135–145)
SPECIFIC GRAVITY UA: 1.03 (ref 1–1.03)
SQUAMOUS EPITHELIAL: 4 /HPF
TOTAL IMMATURE NEUTOROPHIL: 0.01 K/CU MM
TOTAL NUCLEATED RBC: 0 K/CU MM
TOTAL PROTEIN: 6.2 GM/DL (ref 6.4–8.2)
TRICHOMONAS: ABNORMAL /HPF
UROBILINOGEN, URINE: NORMAL MG/DL (ref 0.2–1)
WBC # BLD: 4.9 K/CU MM (ref 4–10.5)
WBC UA: 2 /HPF (ref 0–5)

## 2019-09-29 PROCEDURE — 81001 URINALYSIS AUTO W/SCOPE: CPT

## 2019-09-29 PROCEDURE — 83690 ASSAY OF LIPASE: CPT

## 2019-09-29 PROCEDURE — 2580000003 HC RX 258: Performed by: FAMILY MEDICINE

## 2019-09-29 PROCEDURE — 6370000000 HC RX 637 (ALT 250 FOR IP): Performed by: EMERGENCY MEDICINE

## 2019-09-29 PROCEDURE — 6360000002 HC RX W HCPCS: Performed by: EMERGENCY MEDICINE

## 2019-09-29 PROCEDURE — 86901 BLOOD TYPING SEROLOGIC RH(D): CPT

## 2019-09-29 PROCEDURE — 36415 COLL VENOUS BLD VENIPUNCTURE: CPT

## 2019-09-29 PROCEDURE — 85025 COMPLETE CBC W/AUTO DIFF WBC: CPT

## 2019-09-29 PROCEDURE — 1200000000 HC SEMI PRIVATE

## 2019-09-29 PROCEDURE — 86850 RBC ANTIBODY SCREEN: CPT

## 2019-09-29 PROCEDURE — 85018 HEMOGLOBIN: CPT

## 2019-09-29 PROCEDURE — 6370000000 HC RX 637 (ALT 250 FOR IP): Performed by: FAMILY MEDICINE

## 2019-09-29 PROCEDURE — 85014 HEMATOCRIT: CPT

## 2019-09-29 PROCEDURE — 2580000003 HC RX 258: Performed by: EMERGENCY MEDICINE

## 2019-09-29 PROCEDURE — 96374 THER/PROPH/DIAG INJ IV PUSH: CPT

## 2019-09-29 PROCEDURE — 86900 BLOOD TYPING SEROLOGIC ABO: CPT

## 2019-09-29 PROCEDURE — C9113 INJ PANTOPRAZOLE SODIUM, VIA: HCPCS | Performed by: EMERGENCY MEDICINE

## 2019-09-29 PROCEDURE — 96375 TX/PRO/DX INJ NEW DRUG ADDON: CPT

## 2019-09-29 PROCEDURE — 99284 EMERGENCY DEPT VISIT MOD MDM: CPT

## 2019-09-29 PROCEDURE — 86922 COMPATIBILITY TEST ANTIGLOB: CPT

## 2019-09-29 PROCEDURE — 83605 ASSAY OF LACTIC ACID: CPT

## 2019-09-29 PROCEDURE — 87040 BLOOD CULTURE FOR BACTERIA: CPT

## 2019-09-29 PROCEDURE — 80053 COMPREHEN METABOLIC PANEL: CPT

## 2019-09-29 RX ORDER — FERROUS SULFATE 325(65) MG
325 TABLET ORAL 2 TIMES DAILY WITH MEALS
Status: DISCONTINUED | OUTPATIENT
Start: 2019-09-29 | End: 2019-09-29

## 2019-09-29 RX ORDER — CEFUROXIME AXETIL 250 MG/1
500 TABLET ORAL 2 TIMES DAILY
Status: DISCONTINUED | OUTPATIENT
Start: 2019-09-29 | End: 2019-10-01

## 2019-09-29 RX ORDER — GABAPENTIN 400 MG/1
800 CAPSULE ORAL 3 TIMES DAILY
Status: DISCONTINUED | OUTPATIENT
Start: 2019-09-29 | End: 2019-10-03 | Stop reason: HOSPADM

## 2019-09-29 RX ORDER — PANTOPRAZOLE SODIUM 40 MG/1
40 TABLET, DELAYED RELEASE ORAL 2 TIMES DAILY
Status: DISCONTINUED | OUTPATIENT
Start: 2019-09-29 | End: 2019-09-29

## 2019-09-29 RX ORDER — ONDANSETRON 4 MG/1
4 TABLET, ORALLY DISINTEGRATING ORAL EVERY 8 HOURS PRN
Status: DISCONTINUED | OUTPATIENT
Start: 2019-09-29 | End: 2019-10-03 | Stop reason: HOSPADM

## 2019-09-29 RX ORDER — SODIUM CHLORIDE 0.9 % (FLUSH) 0.9 %
10 SYRINGE (ML) INJECTION PRN
Status: DISCONTINUED | OUTPATIENT
Start: 2019-09-29 | End: 2019-10-03 | Stop reason: HOSPADM

## 2019-09-29 RX ORDER — CLONIDINE HYDROCHLORIDE 0.1 MG/1
0.1 TABLET ORAL 2 TIMES DAILY
Status: DISCONTINUED | OUTPATIENT
Start: 2019-09-29 | End: 2019-10-01

## 2019-09-29 RX ORDER — PAROXETINE HYDROCHLORIDE 20 MG/1
40 TABLET, FILM COATED ORAL NIGHTLY
Status: DISCONTINUED | OUTPATIENT
Start: 2019-09-29 | End: 2019-10-03 | Stop reason: HOSPADM

## 2019-09-29 RX ORDER — ESTRADIOL 1 MG/1
0.5 TABLET ORAL DAILY
Status: DISCONTINUED | OUTPATIENT
Start: 2019-09-30 | End: 2019-10-01

## 2019-09-29 RX ORDER — ONDANSETRON 2 MG/ML
4 INJECTION INTRAMUSCULAR; INTRAVENOUS EVERY 30 MIN PRN
Status: DISCONTINUED | OUTPATIENT
Start: 2019-09-29 | End: 2019-09-29 | Stop reason: SDUPTHER

## 2019-09-29 RX ORDER — TIZANIDINE 4 MG/1
4 TABLET ORAL 2 TIMES DAILY
Status: DISCONTINUED | OUTPATIENT
Start: 2019-09-29 | End: 2019-10-01

## 2019-09-29 RX ORDER — HYDROCODONE BITARTRATE AND ACETAMINOPHEN 7.5; 325 MG/1; MG/1
1 TABLET ORAL EVERY 8 HOURS PRN
Status: DISCONTINUED | OUTPATIENT
Start: 2019-09-29 | End: 2019-09-30

## 2019-09-29 RX ORDER — PROMETHAZINE HYDROCHLORIDE 12.5 MG/1
12.5 TABLET ORAL EVERY 6 HOURS PRN
Status: DISCONTINUED | OUTPATIENT
Start: 2019-09-29 | End: 2019-10-01

## 2019-09-29 RX ORDER — SODIUM CHLORIDE 0.9 % (FLUSH) 0.9 %
10 SYRINGE (ML) INJECTION EVERY 12 HOURS SCHEDULED
Status: DISCONTINUED | OUTPATIENT
Start: 2019-09-29 | End: 2019-10-03 | Stop reason: HOSPADM

## 2019-09-29 RX ORDER — LEVOTHYROXINE SODIUM 0.12 MG/1
125 TABLET ORAL DAILY
Status: DISCONTINUED | OUTPATIENT
Start: 2019-09-30 | End: 2019-10-03 | Stop reason: HOSPADM

## 2019-09-29 RX ORDER — PANTOPRAZOLE SODIUM 40 MG/10ML
40 INJECTION, POWDER, LYOPHILIZED, FOR SOLUTION INTRAVENOUS ONCE
Status: COMPLETED | OUTPATIENT
Start: 2019-09-29 | End: 2019-09-29

## 2019-09-29 RX ORDER — 0.9 % SODIUM CHLORIDE 0.9 %
1000 INTRAVENOUS SOLUTION INTRAVENOUS ONCE
Status: COMPLETED | OUTPATIENT
Start: 2019-09-29 | End: 2019-09-30

## 2019-09-29 RX ORDER — ONDANSETRON 2 MG/ML
4 INJECTION INTRAMUSCULAR; INTRAVENOUS EVERY 6 HOURS PRN
Status: DISCONTINUED | OUTPATIENT
Start: 2019-09-29 | End: 2019-10-03 | Stop reason: HOSPADM

## 2019-09-29 RX ORDER — FENTANYL CITRATE 50 UG/ML
50 INJECTION, SOLUTION INTRAMUSCULAR; INTRAVENOUS ONCE
Status: COMPLETED | OUTPATIENT
Start: 2019-09-29 | End: 2019-09-29

## 2019-09-29 RX ORDER — M-VIT,TX,IRON,MINS/CALC/FOLIC 27MG-0.4MG
1 TABLET ORAL DAILY
Status: DISCONTINUED | OUTPATIENT
Start: 2019-09-30 | End: 2019-10-03 | Stop reason: HOSPADM

## 2019-09-29 RX ORDER — DICYCLOMINE HCL 20 MG
20 TABLET ORAL EVERY 6 HOURS
Status: DISCONTINUED | OUTPATIENT
Start: 2019-09-29 | End: 2019-09-29

## 2019-09-29 RX ORDER — OXYCODONE HYDROCHLORIDE AND ACETAMINOPHEN 5; 325 MG/1; MG/1
1 TABLET ORAL
Status: ON HOLD | COMMUNITY
End: 2021-08-03 | Stop reason: HOSPADM

## 2019-09-29 RX ORDER — PANTOPRAZOLE SODIUM 40 MG/10ML
40 INJECTION, POWDER, LYOPHILIZED, FOR SOLUTION INTRAVENOUS DAILY
Status: DISCONTINUED | OUTPATIENT
Start: 2019-09-29 | End: 2019-10-03 | Stop reason: HOSPADM

## 2019-09-29 RX ORDER — ATENOLOL 25 MG/1
25 TABLET ORAL DAILY
Status: DISCONTINUED | OUTPATIENT
Start: 2019-09-30 | End: 2019-10-03 | Stop reason: HOSPADM

## 2019-09-29 RX ORDER — ACETAMINOPHEN 325 MG/1
650 TABLET ORAL EVERY 4 HOURS PRN
Status: DISCONTINUED | OUTPATIENT
Start: 2019-09-29 | End: 2019-10-03 | Stop reason: HOSPADM

## 2019-09-29 RX ORDER — FEEDER CONTAINER WITH PUMP SET
1 EACH MISCELLANEOUS
Status: DISCONTINUED | OUTPATIENT
Start: 2019-09-29 | End: 2019-09-29 | Stop reason: RX

## 2019-09-29 RX ADMIN — PAROXETINE HYDROCHLORIDE 40 MG: 20 TABLET, FILM COATED ORAL at 21:19

## 2019-09-29 RX ADMIN — PANTOPRAZOLE SODIUM 40 MG: 40 INJECTION, POWDER, FOR SOLUTION INTRAVENOUS at 15:33

## 2019-09-29 RX ADMIN — Medication 10 ML: at 21:23

## 2019-09-29 RX ADMIN — ONDANSETRON 4 MG: 2 INJECTION INTRAMUSCULAR; INTRAVENOUS at 14:15

## 2019-09-29 RX ADMIN — GABAPENTIN 800 MG: 400 CAPSULE ORAL at 21:19

## 2019-09-29 RX ADMIN — CEFUROXIME AXETIL 500 MG: 250 TABLET ORAL at 21:19

## 2019-09-29 RX ADMIN — PROMETHAZINE HYDROCHLORIDE 12.5 MG: 12.5 TABLET ORAL at 21:19

## 2019-09-29 RX ADMIN — HYOSCYAMINE SULFATE 125 MCG: 0.12 TABLET ORAL at 14:15

## 2019-09-29 RX ADMIN — HYDROCODONE BITARTRATE AND ACETAMINOPHEN 1 TABLET: 7.5; 325 TABLET ORAL at 18:12

## 2019-09-29 RX ADMIN — SODIUM CHLORIDE 1000 ML: 9 INJECTION, SOLUTION INTRAVENOUS at 14:15

## 2019-09-29 RX ADMIN — TIZANIDINE 4 MG: 4 TABLET ORAL at 21:19

## 2019-09-29 RX ADMIN — CLONIDINE HYDROCHLORIDE 0.1 MG: 0.1 TABLET ORAL at 21:19

## 2019-09-29 RX ADMIN — FENTANYL CITRATE 50 MCG: 50 INJECTION INTRAMUSCULAR; INTRAVENOUS at 15:32

## 2019-09-29 ASSESSMENT — PAIN - FUNCTIONAL ASSESSMENT: PAIN_FUNCTIONAL_ASSESSMENT: PREVENTS OR INTERFERES SOME ACTIVE ACTIVITIES AND ADLS

## 2019-09-29 ASSESSMENT — PAIN DESCRIPTION - PAIN TYPE
TYPE: ACUTE PAIN
TYPE: ACUTE PAIN

## 2019-09-29 ASSESSMENT — PAIN DESCRIPTION - PROGRESSION: CLINICAL_PROGRESSION: NOT CHANGED

## 2019-09-29 ASSESSMENT — ENCOUNTER SYMPTOMS
COUGH: 0
BACK PAIN: 0
CONSTIPATION: 0
SHORTNESS OF BREATH: 0
SORE THROAT: 0
RHINORRHEA: 0
CHEST TIGHTNESS: 1
DIARRHEA: 0
EYE REDNESS: 0
VOMITING: 0
ABDOMINAL PAIN: 1
NAUSEA: 1

## 2019-09-29 ASSESSMENT — PAIN SCALES - GENERAL
PAINLEVEL_OUTOF10: 7
PAINLEVEL_OUTOF10: 8
PAINLEVEL_OUTOF10: 8

## 2019-09-29 ASSESSMENT — PAIN DESCRIPTION - DESCRIPTORS: DESCRIPTORS: ACHING

## 2019-09-29 ASSESSMENT — PAIN DESCRIPTION - ORIENTATION: ORIENTATION: RIGHT

## 2019-09-29 ASSESSMENT — PAIN DESCRIPTION - LOCATION
LOCATION: GENERALIZED
LOCATION: ABDOMEN

## 2019-09-29 ASSESSMENT — PAIN DESCRIPTION - ONSET: ONSET: ON-GOING

## 2019-09-29 ASSESSMENT — PAIN DESCRIPTION - FREQUENCY: FREQUENCY: CONTINUOUS

## 2019-09-30 LAB
HCT VFR BLD CALC: 28.6 % (ref 37–47)
HCT VFR BLD CALC: 29.8 % (ref 37–47)
HCT VFR BLD CALC: 30 % (ref 37–47)
HEMOGLOBIN: 8.1 GM/DL (ref 12.5–16)
HEMOGLOBIN: 8.4 GM/DL (ref 12.5–16)
HEMOGLOBIN: 8.5 GM/DL (ref 12.5–16)
MCH RBC QN AUTO: 22.5 PG (ref 27–31)
MCHC RBC AUTO-ENTMCNC: 28.3 % (ref 32–36)
MCV RBC AUTO: 79.4 FL (ref 78–100)
PDW BLD-RTO: 14.6 % (ref 11.7–14.9)
PLATELET # BLD: 217 K/CU MM (ref 140–440)
PMV BLD AUTO: 10.9 FL (ref 7.5–11.1)
RBC # BLD: 3.6 M/CU MM (ref 4.2–5.4)
WBC # BLD: 4.1 K/CU MM (ref 4–10.5)

## 2019-09-30 PROCEDURE — 6360000002 HC RX W HCPCS: Performed by: SURGERY

## 2019-09-30 PROCEDURE — G0008 ADMIN INFLUENZA VIRUS VAC: HCPCS | Performed by: FAMILY MEDICINE

## 2019-09-30 PROCEDURE — 85014 HEMATOCRIT: CPT

## 2019-09-30 PROCEDURE — 1200000000 HC SEMI PRIVATE

## 2019-09-30 PROCEDURE — 2580000003 HC RX 258: Performed by: SURGERY

## 2019-09-30 PROCEDURE — 6370000000 HC RX 637 (ALT 250 FOR IP): Performed by: FAMILY MEDICINE

## 2019-09-30 PROCEDURE — 85027 COMPLETE CBC AUTOMATED: CPT

## 2019-09-30 PROCEDURE — 99221 1ST HOSP IP/OBS SF/LOW 40: CPT | Performed by: SURGERY

## 2019-09-30 PROCEDURE — 90686 IIV4 VACC NO PRSV 0.5 ML IM: CPT | Performed by: FAMILY MEDICINE

## 2019-09-30 PROCEDURE — 6360000002 HC RX W HCPCS: Performed by: SPECIALIST

## 2019-09-30 PROCEDURE — 6360000002 HC RX W HCPCS: Performed by: FAMILY MEDICINE

## 2019-09-30 PROCEDURE — 85018 HEMOGLOBIN: CPT

## 2019-09-30 PROCEDURE — 2500000003 HC RX 250 WO HCPCS: Performed by: SURGERY

## 2019-09-30 PROCEDURE — C9113 INJ PANTOPRAZOLE SODIUM, VIA: HCPCS | Performed by: SURGERY

## 2019-09-30 PROCEDURE — C9113 INJ PANTOPRAZOLE SODIUM, VIA: HCPCS | Performed by: SPECIALIST

## 2019-09-30 PROCEDURE — 6370000000 HC RX 637 (ALT 250 FOR IP): Performed by: SURGERY

## 2019-09-30 PROCEDURE — 2580000003 HC RX 258: Performed by: FAMILY MEDICINE

## 2019-09-30 RX ORDER — PANTOPRAZOLE SODIUM 40 MG/10ML
40 INJECTION, POWDER, LYOPHILIZED, FOR SOLUTION INTRAVENOUS DAILY
Status: DISCONTINUED | OUTPATIENT
Start: 2019-10-03 | End: 2019-10-03 | Stop reason: HOSPADM

## 2019-09-30 RX ORDER — 0.9 % SODIUM CHLORIDE 0.9 %
10 VIAL (ML) INJECTION DAILY
Status: DISCONTINUED | OUTPATIENT
Start: 2019-10-03 | End: 2019-10-03 | Stop reason: HOSPADM

## 2019-09-30 RX ORDER — OXYCODONE HYDROCHLORIDE AND ACETAMINOPHEN 5; 325 MG/1; MG/1
2 TABLET ORAL EVERY 6 HOURS PRN
Status: DISCONTINUED | OUTPATIENT
Start: 2019-09-30 | End: 2019-10-03 | Stop reason: HOSPADM

## 2019-09-30 RX ADMIN — PANTOPRAZOLE SODIUM 8 MG/HR: 40 INJECTION, POWDER, FOR SOLUTION INTRAVENOUS at 10:22

## 2019-09-30 RX ADMIN — GABAPENTIN 800 MG: 400 CAPSULE ORAL at 22:46

## 2019-09-30 RX ADMIN — ESTRADIOL 0.5 MG: 1 TABLET ORAL at 08:56

## 2019-09-30 RX ADMIN — PROMETHAZINE HYDROCHLORIDE 12.5 MG: 12.5 TABLET ORAL at 03:08

## 2019-09-30 RX ADMIN — Medication 10 ML: at 10:49

## 2019-09-30 RX ADMIN — GABAPENTIN 800 MG: 400 CAPSULE ORAL at 16:40

## 2019-09-30 RX ADMIN — CEFUROXIME AXETIL 500 MG: 250 TABLET ORAL at 08:56

## 2019-09-30 RX ADMIN — HYDROCODONE BITARTRATE AND ACETAMINOPHEN 1 TABLET: 7.5; 325 TABLET ORAL at 03:08

## 2019-09-30 RX ADMIN — PAROXETINE HYDROCHLORIDE 40 MG: 20 TABLET, FILM COATED ORAL at 22:46

## 2019-09-30 RX ADMIN — OXYCODONE HYDROCHLORIDE AND ACETAMINOPHEN 2 TABLET: 5; 325 TABLET ORAL at 17:15

## 2019-09-30 RX ADMIN — PROMETHAZINE HYDROCHLORIDE 12.5 MG: 12.5 TABLET ORAL at 09:04

## 2019-09-30 RX ADMIN — HYDROCODONE BITARTRATE AND ACETAMINOPHEN 1 TABLET: 7.5; 325 TABLET ORAL at 10:48

## 2019-09-30 RX ADMIN — CLONIDINE HYDROCHLORIDE 0.1 MG: 0.1 TABLET ORAL at 08:56

## 2019-09-30 RX ADMIN — FAMOTIDINE 40 MG: 10 INJECTION, SOLUTION INTRAVENOUS at 22:45

## 2019-09-30 RX ADMIN — OXYCODONE HYDROCHLORIDE AND ACETAMINOPHEN 2 TABLET: 5; 325 TABLET ORAL at 23:22

## 2019-09-30 RX ADMIN — GABAPENTIN 800 MG: 400 CAPSULE ORAL at 08:56

## 2019-09-30 RX ADMIN — CEFUROXIME AXETIL 500 MG: 250 TABLET ORAL at 22:45

## 2019-09-30 RX ADMIN — VITAMIN D, TAB 1000IU (100/BT) 5000 UNITS: 25 TAB at 08:55

## 2019-09-30 RX ADMIN — LEVOTHYROXINE SODIUM 125 MCG: 125 TABLET ORAL at 05:46

## 2019-09-30 RX ADMIN — PANTOPRAZOLE SODIUM 8 MG/HR: 40 INJECTION, POWDER, FOR SOLUTION INTRAVENOUS at 22:07

## 2019-09-30 RX ADMIN — PANTOPRAZOLE SODIUM 40 MG: 40 INJECTION, POWDER, FOR SOLUTION INTRAVENOUS at 08:55

## 2019-09-30 RX ADMIN — TIZANIDINE 4 MG: 4 TABLET ORAL at 08:56

## 2019-09-30 RX ADMIN — Medication 10 ML: at 22:47

## 2019-09-30 RX ADMIN — INFLUENZA A VIRUS A/BRISBANE/02/2018 IVR-190 (H1N1) ANTIGEN (PROPIOLACTONE INACTIVATED), INFLUENZA A VIRUS A/KANSAS/14/2017 X-327 (H3N2) ANTIGEN (PROPIOLACTONE INACTIVATED), INFLUENZA B VIRUS B/MARYLAND/15/2016 ANTIGEN (PROPIOLACTONE INACTIVATED), INFLUENZA B VIRUS B/PHUKET/3073/2013 BVR-1B ANTIGEN (PROPIOLACTONE INACTIVATED) 0.5 ML: 15; 15; 15; 15 INJECTION, SUSPENSION INTRAMUSCULAR at 08:55

## 2019-09-30 RX ADMIN — TIZANIDINE 4 MG: 4 TABLET ORAL at 22:46

## 2019-09-30 RX ADMIN — PROMETHAZINE HYDROCHLORIDE 12.5 MG: 12.5 TABLET ORAL at 22:46

## 2019-09-30 RX ADMIN — PANTOPRAZOLE SODIUM 80 MG: 40 INJECTION, POWDER, FOR SOLUTION INTRAVENOUS at 10:20

## 2019-09-30 RX ADMIN — CLONIDINE HYDROCHLORIDE 0.1 MG: 0.1 TABLET ORAL at 22:46

## 2019-09-30 RX ADMIN — ATENOLOL 25 MG: 25 TABLET ORAL at 08:56

## 2019-09-30 RX ADMIN — Medication 10 ML: at 08:55

## 2019-09-30 RX ADMIN — MULTIPLE VITAMINS W/ MINERALS TAB 1 TABLET: TAB at 08:56

## 2019-09-30 RX ADMIN — FAMOTIDINE 40 MG: 10 INJECTION, SOLUTION INTRAVENOUS at 10:48

## 2019-09-30 ASSESSMENT — PAIN DESCRIPTION - PAIN TYPE: TYPE: ACUTE PAIN

## 2019-09-30 ASSESSMENT — PAIN DESCRIPTION - LOCATION: LOCATION: ABDOMEN;MOUTH

## 2019-09-30 ASSESSMENT — PAIN SCALES - GENERAL
PAINLEVEL_OUTOF10: 0
PAINLEVEL_OUTOF10: 0
PAINLEVEL_OUTOF10: 7
PAINLEVEL_OUTOF10: 0
PAINLEVEL_OUTOF10: 9
PAINLEVEL_OUTOF10: 6
PAINLEVEL_OUTOF10: 8
PAINLEVEL_OUTOF10: 7

## 2019-09-30 ASSESSMENT — PAIN DESCRIPTION - ORIENTATION: ORIENTATION: RIGHT

## 2019-09-30 ASSESSMENT — PAIN DESCRIPTION - DESCRIPTORS: DESCRIPTORS: ACHING

## 2019-10-01 ENCOUNTER — APPOINTMENT (OUTPATIENT)
Dept: CT IMAGING | Age: 49
DRG: 378 | End: 2019-10-01
Payer: COMMERCIAL

## 2019-10-01 ENCOUNTER — APPOINTMENT (OUTPATIENT)
Dept: GENERAL RADIOLOGY | Age: 49
DRG: 378 | End: 2019-10-01
Payer: COMMERCIAL

## 2019-10-01 LAB
ALBUMIN SERPL-MCNC: 4 GM/DL (ref 3.4–5)
ALBUMIN SERPL-MCNC: 4.1 GM/DL (ref 3.4–5)
ALP BLD-CCNC: 122 IU/L (ref 40–129)
ALP BLD-CCNC: 125 IU/L (ref 40–129)
ALT SERPL-CCNC: 16 U/L (ref 10–40)
ALT SERPL-CCNC: 17 U/L (ref 10–40)
AMYLASE: 26 U/L (ref 25–115)
ANION GAP SERPL CALCULATED.3IONS-SCNC: 10 MMOL/L (ref 4–16)
ANION GAP SERPL CALCULATED.3IONS-SCNC: 8 MMOL/L (ref 4–16)
APTT: 27.1 SECONDS (ref 21.2–33)
AST SERPL-CCNC: 21 IU/L (ref 15–37)
AST SERPL-CCNC: 22 IU/L (ref 15–37)
BILIRUB SERPL-MCNC: 0.1 MG/DL (ref 0–1)
BILIRUB SERPL-MCNC: 0.1 MG/DL (ref 0–1)
BUN BLDV-MCNC: 17 MG/DL (ref 6–23)
BUN BLDV-MCNC: 19 MG/DL (ref 6–23)
CALCIUM SERPL-MCNC: 10 MG/DL (ref 8.3–10.6)
CALCIUM SERPL-MCNC: 10 MG/DL (ref 8.3–10.6)
CHLORIDE BLD-SCNC: 101 MMOL/L (ref 99–110)
CHLORIDE BLD-SCNC: 104 MMOL/L (ref 99–110)
CO2: 25 MMOL/L (ref 21–32)
CO2: 25 MMOL/L (ref 21–32)
CREAT SERPL-MCNC: 0.7 MG/DL (ref 0.6–1.1)
CREAT SERPL-MCNC: 0.9 MG/DL (ref 0.6–1.1)
GFR AFRICAN AMERICAN: >60 ML/MIN/1.73M2
GFR AFRICAN AMERICAN: >60 ML/MIN/1.73M2
GFR NON-AFRICAN AMERICAN: >60 ML/MIN/1.73M2
GFR NON-AFRICAN AMERICAN: >60 ML/MIN/1.73M2
GLUCOSE BLD-MCNC: 102 MG/DL (ref 70–99)
GLUCOSE BLD-MCNC: 76 MG/DL (ref 70–99)
GLUCOSE BLD-MCNC: 79 MG/DL (ref 70–99)
HCT VFR BLD CALC: 32.2 % (ref 37–47)
HCT VFR BLD CALC: 33 % (ref 37–47)
HEMOGLOBIN: 9.2 GM/DL (ref 12.5–16)
HEMOGLOBIN: 9.3 GM/DL (ref 12.5–16)
INR BLD: 0.93 INDEX
LIPASE: 20 IU/L (ref 13–60)
MCH RBC QN AUTO: 22.9 PG (ref 27–31)
MCH RBC QN AUTO: 22.9 PG (ref 27–31)
MCHC RBC AUTO-ENTMCNC: 28.2 % (ref 32–36)
MCHC RBC AUTO-ENTMCNC: 28.6 % (ref 32–36)
MCV RBC AUTO: 80.1 FL (ref 78–100)
MCV RBC AUTO: 81.3 FL (ref 78–100)
PDW BLD-RTO: 15.6 % (ref 11.7–14.9)
PDW BLD-RTO: 15.8 % (ref 11.7–14.9)
PLATELET # BLD: 194 K/CU MM (ref 140–440)
PLATELET # BLD: 212 K/CU MM (ref 140–440)
PMV BLD AUTO: 10.5 FL (ref 7.5–11.1)
PMV BLD AUTO: 10.7 FL (ref 7.5–11.1)
POTASSIUM SERPL-SCNC: 4.3 MMOL/L (ref 3.5–5.1)
POTASSIUM SERPL-SCNC: 4.8 MMOL/L (ref 3.5–5.1)
PROTHROMBIN TIME: 10.6 SECONDS (ref 9.12–12.5)
RBC # BLD: 4.02 M/CU MM (ref 4.2–5.4)
RBC # BLD: 4.06 M/CU MM (ref 4.2–5.4)
SODIUM BLD-SCNC: 134 MMOL/L (ref 135–145)
SODIUM BLD-SCNC: 139 MMOL/L (ref 135–145)
TOTAL PROTEIN: 6.2 GM/DL (ref 6.4–8.2)
TOTAL PROTEIN: 6.3 GM/DL (ref 6.4–8.2)
WBC # BLD: 4.4 K/CU MM (ref 4–10.5)
WBC # BLD: 4.8 K/CU MM (ref 4–10.5)

## 2019-10-01 PROCEDURE — 51702 INSERT TEMP BLADDER CATH: CPT

## 2019-10-01 PROCEDURE — 80053 COMPREHEN METABOLIC PANEL: CPT

## 2019-10-01 PROCEDURE — 6360000002 HC RX W HCPCS: Performed by: SPECIALIST

## 2019-10-01 PROCEDURE — 6360000002 HC RX W HCPCS

## 2019-10-01 PROCEDURE — 82962 GLUCOSE BLOOD TEST: CPT

## 2019-10-01 PROCEDURE — 36415 COLL VENOUS BLD VENIPUNCTURE: CPT

## 2019-10-01 PROCEDURE — 85730 THROMBOPLASTIN TIME PARTIAL: CPT

## 2019-10-01 PROCEDURE — 83690 ASSAY OF LIPASE: CPT

## 2019-10-01 PROCEDURE — 2580000003 HC RX 258: Performed by: SURGERY

## 2019-10-01 PROCEDURE — 99232 SBSQ HOSP IP/OBS MODERATE 35: CPT | Performed by: SURGERY

## 2019-10-01 PROCEDURE — 2000000000 HC ICU R&B

## 2019-10-01 PROCEDURE — 6360000002 HC RX W HCPCS: Performed by: HOSPITALIST

## 2019-10-01 PROCEDURE — C9113 INJ PANTOPRAZOLE SODIUM, VIA: HCPCS | Performed by: SPECIALIST

## 2019-10-01 PROCEDURE — 70450 CT HEAD/BRAIN W/O DYE: CPT

## 2019-10-01 PROCEDURE — 2580000003 HC RX 258: Performed by: FAMILY MEDICINE

## 2019-10-01 PROCEDURE — 2500000003 HC RX 250 WO HCPCS: Performed by: SURGERY

## 2019-10-01 PROCEDURE — 94761 N-INVAS EAR/PLS OXIMETRY MLT: CPT

## 2019-10-01 PROCEDURE — C9113 INJ PANTOPRAZOLE SODIUM, VIA: HCPCS | Performed by: SURGERY

## 2019-10-01 PROCEDURE — 6370000000 HC RX 637 (ALT 250 FOR IP): Performed by: SURGERY

## 2019-10-01 PROCEDURE — 6370000000 HC RX 637 (ALT 250 FOR IP): Performed by: FAMILY MEDICINE

## 2019-10-01 PROCEDURE — 85027 COMPLETE CBC AUTOMATED: CPT

## 2019-10-01 PROCEDURE — 82150 ASSAY OF AMYLASE: CPT

## 2019-10-01 PROCEDURE — 85018 HEMOGLOBIN: CPT

## 2019-10-01 PROCEDURE — 85610 PROTHROMBIN TIME: CPT

## 2019-10-01 PROCEDURE — 71045 X-RAY EXAM CHEST 1 VIEW: CPT

## 2019-10-01 PROCEDURE — 6360000002 HC RX W HCPCS: Performed by: FAMILY MEDICINE

## 2019-10-01 PROCEDURE — 2580000003 HC RX 258: Performed by: HOSPITALIST

## 2019-10-01 PROCEDURE — 6360000002 HC RX W HCPCS: Performed by: SURGERY

## 2019-10-01 RX ORDER — CALCIUM CARBONATE/VITAMIN D3 250-3.125
1 TABLET ORAL 2 TIMES DAILY
Status: DISCONTINUED | OUTPATIENT
Start: 2019-10-01 | End: 2019-10-03 | Stop reason: HOSPADM

## 2019-10-01 RX ORDER — PHENOBARBITAL SODIUM 65 MG/ML
65 INJECTION INTRAMUSCULAR ONCE
Status: COMPLETED | OUTPATIENT
Start: 2019-10-01 | End: 2019-10-01

## 2019-10-01 RX ORDER — LORAZEPAM 2 MG/ML
INJECTION INTRAMUSCULAR
Status: COMPLETED
Start: 2019-10-01 | End: 2019-10-01

## 2019-10-01 RX ORDER — LORAZEPAM 2 MG/ML
1 INJECTION INTRAMUSCULAR EVERY 4 HOURS PRN
Status: DISCONTINUED | OUTPATIENT
Start: 2019-10-01 | End: 2019-10-03 | Stop reason: HOSPADM

## 2019-10-01 RX ORDER — PROMETHAZINE HYDROCHLORIDE 12.5 MG/1
12.5 TABLET ORAL EVERY 12 HOURS PRN
Status: DISCONTINUED | OUTPATIENT
Start: 2019-10-01 | End: 2019-10-03 | Stop reason: HOSPADM

## 2019-10-01 RX ORDER — TIZANIDINE 4 MG/1
4 TABLET ORAL NIGHTLY
Status: DISCONTINUED | OUTPATIENT
Start: 2019-10-02 | End: 2019-10-03 | Stop reason: HOSPADM

## 2019-10-01 RX ORDER — SODIUM CHLORIDE 9 MG/ML
1000 INJECTION, SOLUTION INTRAVENOUS CONTINUOUS
Status: DISCONTINUED | OUTPATIENT
Start: 2019-10-01 | End: 2019-10-03 | Stop reason: HOSPADM

## 2019-10-01 RX ADMIN — GABAPENTIN 800 MG: 400 CAPSULE ORAL at 13:00

## 2019-10-01 RX ADMIN — Medication 10 ML: at 08:19

## 2019-10-01 RX ADMIN — PAROXETINE HYDROCHLORIDE 40 MG: 20 TABLET, FILM COATED ORAL at 22:01

## 2019-10-01 RX ADMIN — LORAZEPAM 1 MG: 2 INJECTION, SOLUTION INTRAMUSCULAR; INTRAVENOUS at 14:29

## 2019-10-01 RX ADMIN — OXYCODONE HYDROCHLORIDE AND ACETAMINOPHEN 2 TABLET: 5; 325 TABLET ORAL at 22:16

## 2019-10-01 RX ADMIN — PIPERACILLIN AND TAZOBACTAM 3.38 G: 3; .375 INJECTION, POWDER, LYOPHILIZED, FOR SOLUTION INTRAVENOUS at 16:29

## 2019-10-01 RX ADMIN — SODIUM CHLORIDE 1000 ML: 9 INJECTION, SOLUTION INTRAVENOUS at 20:25

## 2019-10-01 RX ADMIN — TIZANIDINE 4 MG: 4 TABLET ORAL at 08:20

## 2019-10-01 RX ADMIN — LEVETIRACETAM 1000 MG: 100 INJECTION, SOLUTION INTRAVENOUS at 14:43

## 2019-10-01 RX ADMIN — CLONIDINE HYDROCHLORIDE 0.1 MG: 0.1 TABLET ORAL at 08:20

## 2019-10-01 RX ADMIN — PIPERACILLIN AND TAZOBACTAM 3.38 G: 3; .375 INJECTION, POWDER, LYOPHILIZED, FOR SOLUTION INTRAVENOUS at 22:02

## 2019-10-01 RX ADMIN — CEFUROXIME AXETIL 500 MG: 250 TABLET ORAL at 08:20

## 2019-10-01 RX ADMIN — VITAMIN D, TAB 1000IU (100/BT) 5000 UNITS: 25 TAB at 08:20

## 2019-10-01 RX ADMIN — FAMOTIDINE 40 MG: 10 INJECTION, SOLUTION INTRAVENOUS at 22:00

## 2019-10-01 RX ADMIN — CALCIUM CARBONATE-CHOLECALCIFEROL TAB 250 MG-125 UNIT 250 MG: 250-125 TAB at 22:02

## 2019-10-01 RX ADMIN — OXYCODONE HYDROCHLORIDE AND ACETAMINOPHEN 2 TABLET: 5; 325 TABLET ORAL at 08:19

## 2019-10-01 RX ADMIN — ATENOLOL 25 MG: 25 TABLET ORAL at 08:20

## 2019-10-01 RX ADMIN — LEVOTHYROXINE SODIUM 125 MCG: 125 TABLET ORAL at 05:05

## 2019-10-01 RX ADMIN — ONDANSETRON 4 MG: 4 TABLET, ORALLY DISINTEGRATING ORAL at 03:01

## 2019-10-01 RX ADMIN — Medication 10 ML: at 22:01

## 2019-10-01 RX ADMIN — GABAPENTIN 800 MG: 400 CAPSULE ORAL at 08:20

## 2019-10-01 RX ADMIN — SODIUM CHLORIDE 1000 ML: 9 INJECTION, SOLUTION INTRAVENOUS at 12:58

## 2019-10-01 RX ADMIN — PANTOPRAZOLE SODIUM 40 MG: 40 INJECTION, POWDER, FOR SOLUTION INTRAVENOUS at 08:19

## 2019-10-01 RX ADMIN — LORAZEPAM 1 MG: 2 INJECTION, SOLUTION INTRAMUSCULAR; INTRAVENOUS at 14:39

## 2019-10-01 RX ADMIN — PHENOBARBITAL SODIUM 65 MG: 65 INJECTION INTRAMUSCULAR; INTRAVENOUS at 14:45

## 2019-10-01 RX ADMIN — ONDANSETRON 4 MG: 2 INJECTION INTRAMUSCULAR; INTRAVENOUS at 22:17

## 2019-10-01 RX ADMIN — ESTRADIOL 0.5 MG: 1 TABLET ORAL at 08:20

## 2019-10-01 RX ADMIN — GABAPENTIN 800 MG: 400 CAPSULE ORAL at 22:01

## 2019-10-01 RX ADMIN — MULTIPLE VITAMINS W/ MINERALS TAB 1 TABLET: TAB at 08:20

## 2019-10-01 RX ADMIN — PANTOPRAZOLE SODIUM 8 MG/HR: 40 INJECTION, POWDER, FOR SOLUTION INTRAVENOUS at 06:44

## 2019-10-01 RX ADMIN — FAMOTIDINE 40 MG: 10 INJECTION, SOLUTION INTRAVENOUS at 08:19

## 2019-10-01 ASSESSMENT — PAIN SCALES - GENERAL
PAINLEVEL_OUTOF10: 0
PAINLEVEL_OUTOF10: 7
PAINLEVEL_OUTOF10: 5
PAINLEVEL_OUTOF10: 6
PAINLEVEL_OUTOF10: 0

## 2019-10-02 ENCOUNTER — APPOINTMENT (OUTPATIENT)
Dept: MRI IMAGING | Age: 49
DRG: 378 | End: 2019-10-02
Payer: COMMERCIAL

## 2019-10-02 LAB
ABO/RH: NORMAL
ANTIBODY SCREEN: NEGATIVE
COMPONENT: NORMAL
COMPONENT: NORMAL
CROSSMATCH RESULT: NORMAL
CROSSMATCH RESULT: NORMAL
FOLATE: 8.5 NG/ML (ref 3.1–17.5)
HCT VFR BLD CALC: 29.8 % (ref 37–47)
HCT VFR BLD CALC: 31.6 % (ref 37–47)
HCT VFR BLD CALC: 32.4 % (ref 37–47)
HEMOGLOBIN: 8.6 GM/DL (ref 12.5–16)
HEMOGLOBIN: 9.1 GM/DL (ref 12.5–16)
HEMOGLOBIN: 9.3 GM/DL (ref 12.5–16)
MCH RBC QN AUTO: 23 PG (ref 27–31)
MCHC RBC AUTO-ENTMCNC: 28.9 % (ref 32–36)
MCV RBC AUTO: 79.7 FL (ref 78–100)
PDW BLD-RTO: 16 % (ref 11.7–14.9)
PLATELET # BLD: 195 K/CU MM (ref 140–440)
PMV BLD AUTO: 10.4 FL (ref 7.5–11.1)
RBC # BLD: 3.74 M/CU MM (ref 4.2–5.4)
STATUS: NORMAL
STATUS: NORMAL
TRANSFUSION STATUS: NORMAL
TRANSFUSION STATUS: NORMAL
TSH HIGH SENSITIVITY: 0.89 UIU/ML (ref 0.27–4.2)
UNIT DIVISION: 0
UNIT DIVISION: 0
UNIT NUMBER: NORMAL
UNIT NUMBER: NORMAL
VITAMIN B-12: 298 PG/ML (ref 211–911)
WBC # BLD: 3.7 K/CU MM (ref 4–10.5)

## 2019-10-02 PROCEDURE — 70551 MRI BRAIN STEM W/O DYE: CPT

## 2019-10-02 PROCEDURE — 2500000003 HC RX 250 WO HCPCS: Performed by: SURGERY

## 2019-10-02 PROCEDURE — 2580000003 HC RX 258: Performed by: PSYCHIATRY & NEUROLOGY

## 2019-10-02 PROCEDURE — 85027 COMPLETE CBC AUTOMATED: CPT

## 2019-10-02 PROCEDURE — 2580000003 HC RX 258: Performed by: HOSPITALIST

## 2019-10-02 PROCEDURE — 82607 VITAMIN B-12: CPT

## 2019-10-02 PROCEDURE — 6370000000 HC RX 637 (ALT 250 FOR IP): Performed by: PSYCHIATRY & NEUROLOGY

## 2019-10-02 PROCEDURE — 6370000000 HC RX 637 (ALT 250 FOR IP): Performed by: SURGERY

## 2019-10-02 PROCEDURE — 99232 SBSQ HOSP IP/OBS MODERATE 35: CPT | Performed by: SURGERY

## 2019-10-02 PROCEDURE — 85018 HEMOGLOBIN: CPT

## 2019-10-02 PROCEDURE — 85014 HEMATOCRIT: CPT

## 2019-10-02 PROCEDURE — 82746 ASSAY OF FOLIC ACID SERUM: CPT

## 2019-10-02 PROCEDURE — 6360000002 HC RX W HCPCS: Performed by: HOSPITALIST

## 2019-10-02 PROCEDURE — 6370000000 HC RX 637 (ALT 250 FOR IP): Performed by: FAMILY MEDICINE

## 2019-10-02 PROCEDURE — 6370000000 HC RX 637 (ALT 250 FOR IP): Performed by: NURSE PRACTITIONER

## 2019-10-02 PROCEDURE — 6360000002 HC RX W HCPCS: Performed by: FAMILY MEDICINE

## 2019-10-02 PROCEDURE — C9113 INJ PANTOPRAZOLE SODIUM, VIA: HCPCS | Performed by: SPECIALIST

## 2019-10-02 PROCEDURE — 6360000002 HC RX W HCPCS: Performed by: SPECIALIST

## 2019-10-02 PROCEDURE — 6360000002 HC RX W HCPCS: Performed by: PSYCHIATRY & NEUROLOGY

## 2019-10-02 PROCEDURE — 84443 ASSAY THYROID STIM HORMONE: CPT

## 2019-10-02 PROCEDURE — 2580000003 HC RX 258: Performed by: FAMILY MEDICINE

## 2019-10-02 PROCEDURE — C9254 INJECTION, LACOSAMIDE: HCPCS | Performed by: PSYCHIATRY & NEUROLOGY

## 2019-10-02 PROCEDURE — 1200000000 HC SEMI PRIVATE

## 2019-10-02 RX ORDER — TIZANIDINE 4 MG/1
4 TABLET ORAL EVERY 6 HOURS PRN
Status: DISCONTINUED | OUTPATIENT
Start: 2019-10-02 | End: 2019-10-03 | Stop reason: HOSPADM

## 2019-10-02 RX ORDER — LACOSAMIDE 10 MG/ML
100 INJECTION, SOLUTION INTRAVENOUS 2 TIMES DAILY
Status: DISCONTINUED | OUTPATIENT
Start: 2019-10-02 | End: 2019-10-02 | Stop reason: ALTCHOICE

## 2019-10-02 RX ORDER — LORAZEPAM 1 MG/1
1 TABLET ORAL ONCE
Status: COMPLETED | OUTPATIENT
Start: 2019-10-02 | End: 2019-10-02

## 2019-10-02 RX ORDER — BUSPIRONE HYDROCHLORIDE 10 MG/1
10 TABLET ORAL 2 TIMES DAILY
Status: DISCONTINUED | OUTPATIENT
Start: 2019-10-02 | End: 2019-10-02

## 2019-10-02 RX ORDER — BUSPIRONE HYDROCHLORIDE 10 MG/1
10 TABLET ORAL 3 TIMES DAILY
Status: DISCONTINUED | OUTPATIENT
Start: 2019-10-02 | End: 2019-10-03 | Stop reason: HOSPADM

## 2019-10-02 RX ORDER — TRAZODONE HYDROCHLORIDE 50 MG/1
50 TABLET ORAL NIGHTLY
Status: DISCONTINUED | OUTPATIENT
Start: 2019-10-02 | End: 2019-10-03 | Stop reason: HOSPADM

## 2019-10-02 RX ADMIN — FAMOTIDINE 40 MG: 10 INJECTION, SOLUTION INTRAVENOUS at 21:10

## 2019-10-02 RX ADMIN — GABAPENTIN 800 MG: 400 CAPSULE ORAL at 21:11

## 2019-10-02 RX ADMIN — OXYCODONE HYDROCHLORIDE AND ACETAMINOPHEN 2 TABLET: 5; 325 TABLET ORAL at 15:55

## 2019-10-02 RX ADMIN — PROMETHAZINE HYDROCHLORIDE 12.5 MG: 12.5 TABLET ORAL at 02:03

## 2019-10-02 RX ADMIN — Medication 10 ML: at 08:55

## 2019-10-02 RX ADMIN — SODIUM CHLORIDE 1000 ML: 9 INJECTION, SOLUTION INTRAVENOUS at 19:02

## 2019-10-02 RX ADMIN — PROMETHAZINE HYDROCHLORIDE 12.5 MG: 12.5 TABLET ORAL at 16:26

## 2019-10-02 RX ADMIN — PANTOPRAZOLE SODIUM 40 MG: 40 INJECTION, POWDER, FOR SOLUTION INTRAVENOUS at 08:52

## 2019-10-02 RX ADMIN — TIZANIDINE 4 MG: 4 TABLET ORAL at 21:11

## 2019-10-02 RX ADMIN — CALCIUM CARBONATE-CHOLECALCIFEROL TAB 250 MG-125 UNIT 250 MG: 250-125 TAB at 21:12

## 2019-10-02 RX ADMIN — LEVOTHYROXINE SODIUM 125 MCG: 125 TABLET ORAL at 05:57

## 2019-10-02 RX ADMIN — MULTIPLE VITAMINS W/ MINERALS TAB 1 TABLET: TAB at 08:52

## 2019-10-02 RX ADMIN — LEVETIRACETAM 500 MG: 100 INJECTION, SOLUTION INTRAVENOUS at 02:05

## 2019-10-02 RX ADMIN — CALCIUM CARBONATE-CHOLECALCIFEROL TAB 250 MG-125 UNIT 250 MG: 250-125 TAB at 08:53

## 2019-10-02 RX ADMIN — FAMOTIDINE 40 MG: 10 INJECTION, SOLUTION INTRAVENOUS at 08:52

## 2019-10-02 RX ADMIN — GABAPENTIN 800 MG: 400 CAPSULE ORAL at 14:30

## 2019-10-02 RX ADMIN — PAROXETINE HYDROCHLORIDE 40 MG: 20 TABLET, FILM COATED ORAL at 21:10

## 2019-10-02 RX ADMIN — ATENOLOL 25 MG: 25 TABLET ORAL at 08:52

## 2019-10-02 RX ADMIN — PIPERACILLIN AND TAZOBACTAM 3.38 G: 3; .375 INJECTION, POWDER, LYOPHILIZED, FOR SOLUTION INTRAVENOUS at 23:08

## 2019-10-02 RX ADMIN — PIPERACILLIN AND TAZOBACTAM 3.38 G: 3; .375 INJECTION, POWDER, LYOPHILIZED, FOR SOLUTION INTRAVENOUS at 15:30

## 2019-10-02 RX ADMIN — BUSPIRONE HYDROCHLORIDE 10 MG: 10 TABLET ORAL at 17:43

## 2019-10-02 RX ADMIN — ONDANSETRON 4 MG: 2 INJECTION INTRAMUSCULAR; INTRAVENOUS at 14:19

## 2019-10-02 RX ADMIN — ONDANSETRON 4 MG: 2 INJECTION INTRAMUSCULAR; INTRAVENOUS at 09:21

## 2019-10-02 RX ADMIN — DEXTROSE MONOHYDRATE 100 MG: 50 INJECTION, SOLUTION INTRAVENOUS at 15:22

## 2019-10-02 RX ADMIN — PIPERACILLIN AND TAZOBACTAM 3.38 G: 3; .375 INJECTION, POWDER, LYOPHILIZED, FOR SOLUTION INTRAVENOUS at 05:57

## 2019-10-02 RX ADMIN — LORAZEPAM 1 MG: 1 TABLET ORAL at 10:35

## 2019-10-02 RX ADMIN — GABAPENTIN 800 MG: 400 CAPSULE ORAL at 08:52

## 2019-10-02 RX ADMIN — OXYCODONE HYDROCHLORIDE AND ACETAMINOPHEN 2 TABLET: 5; 325 TABLET ORAL at 09:17

## 2019-10-02 RX ADMIN — VITAMIN D, TAB 1000IU (100/BT) 5000 UNITS: 25 TAB at 08:53

## 2019-10-02 RX ADMIN — TRAZODONE HYDROCHLORIDE 50 MG: 50 TABLET ORAL at 21:10

## 2019-10-02 RX ADMIN — BUSPIRONE HYDROCHLORIDE 10 MG: 10 TABLET ORAL at 21:10

## 2019-10-02 RX ADMIN — SODIUM CHLORIDE 1000 ML: 9 INJECTION, SOLUTION INTRAVENOUS at 03:10

## 2019-10-02 RX ADMIN — OXYCODONE HYDROCHLORIDE AND ACETAMINOPHEN 2 TABLET: 5; 325 TABLET ORAL at 22:03

## 2019-10-02 ASSESSMENT — PAIN SCALES - GENERAL
PAINLEVEL_OUTOF10: 6
PAINLEVEL_OUTOF10: 0
PAINLEVEL_OUTOF10: 5
PAINLEVEL_OUTOF10: 6
PAINLEVEL_OUTOF10: 5
PAINLEVEL_OUTOF10: 0
PAINLEVEL_OUTOF10: 6
PAINLEVEL_OUTOF10: 4
PAINLEVEL_OUTOF10: 5
PAINLEVEL_OUTOF10: 0
PAINLEVEL_OUTOF10: 5
PAINLEVEL_OUTOF10: 4
PAINLEVEL_OUTOF10: 6
PAINLEVEL_OUTOF10: 5
PAINLEVEL_OUTOF10: 4
PAINLEVEL_OUTOF10: 0
PAINLEVEL_OUTOF10: 4
PAINLEVEL_OUTOF10: 0
PAINLEVEL_OUTOF10: 5

## 2019-10-02 ASSESSMENT — PAIN - FUNCTIONAL ASSESSMENT
PAIN_FUNCTIONAL_ASSESSMENT: PREVENTS OR INTERFERES SOME ACTIVE ACTIVITIES AND ADLS

## 2019-10-02 ASSESSMENT — PAIN DESCRIPTION - PAIN TYPE
TYPE: ACUTE PAIN

## 2019-10-02 ASSESSMENT — PAIN DESCRIPTION - PROGRESSION

## 2019-10-02 ASSESSMENT — PAIN DESCRIPTION - ONSET
ONSET: ON-GOING

## 2019-10-02 ASSESSMENT — PAIN DESCRIPTION - DESCRIPTORS
DESCRIPTORS: ACHING

## 2019-10-02 ASSESSMENT — PAIN DESCRIPTION - FREQUENCY
FREQUENCY: CONTINUOUS

## 2019-10-02 ASSESSMENT — PAIN DESCRIPTION - LOCATION
LOCATION: GENERALIZED
LOCATION: GENERALIZED
LOCATION: HEAD;GENERALIZED
LOCATION: GENERALIZED;HEAD
LOCATION: GENERALIZED
LOCATION: HEAD;GENERALIZED
LOCATION: GENERALIZED
LOCATION: HEAD;GENERALIZED
LOCATION: GENERALIZED
LOCATION: HEAD;GENERALIZED
LOCATION: HEAD;GENERALIZED

## 2019-10-02 ASSESSMENT — PAIN DESCRIPTION - ORIENTATION: ORIENTATION: RIGHT

## 2019-10-03 VITALS
TEMPERATURE: 97.9 F | OXYGEN SATURATION: 94 % | BODY MASS INDEX: 46.15 KG/M2 | DIASTOLIC BLOOD PRESSURE: 59 MMHG | HEIGHT: 64 IN | RESPIRATION RATE: 16 BRPM | HEART RATE: 63 BPM | WEIGHT: 270.3 LBS | SYSTOLIC BLOOD PRESSURE: 122 MMHG

## 2019-10-03 LAB
HCT VFR BLD CALC: 30.3 % (ref 37–47)
HCT VFR BLD CALC: 31.3 % (ref 37–47)
HEMOGLOBIN: 8.5 GM/DL (ref 12.5–16)
HEMOGLOBIN: 8.7 GM/DL (ref 12.5–16)
MCH RBC QN AUTO: 23.1 PG (ref 27–31)
MCHC RBC AUTO-ENTMCNC: 28.1 % (ref 32–36)
MCV RBC AUTO: 82.3 FL (ref 78–100)
PDW BLD-RTO: 16.6 % (ref 11.7–14.9)
PLATELET # BLD: 196 K/CU MM (ref 140–440)
PMV BLD AUTO: 10.3 FL (ref 7.5–11.1)
RBC # BLD: 3.68 M/CU MM (ref 4.2–5.4)
WBC # BLD: 4.4 K/CU MM (ref 4–10.5)

## 2019-10-03 PROCEDURE — 6370000000 HC RX 637 (ALT 250 FOR IP): Performed by: FAMILY MEDICINE

## 2019-10-03 PROCEDURE — C9113 INJ PANTOPRAZOLE SODIUM, VIA: HCPCS | Performed by: SPECIALIST

## 2019-10-03 PROCEDURE — 6370000000 HC RX 637 (ALT 250 FOR IP): Performed by: NURSE PRACTITIONER

## 2019-10-03 PROCEDURE — 85027 COMPLETE CBC AUTOMATED: CPT

## 2019-10-03 PROCEDURE — 6360000002 HC RX W HCPCS: Performed by: PSYCHIATRY & NEUROLOGY

## 2019-10-03 PROCEDURE — 99232 SBSQ HOSP IP/OBS MODERATE 35: CPT | Performed by: SURGERY

## 2019-10-03 PROCEDURE — 2580000003 HC RX 258: Performed by: FAMILY MEDICINE

## 2019-10-03 PROCEDURE — 6360000002 HC RX W HCPCS: Performed by: FAMILY MEDICINE

## 2019-10-03 PROCEDURE — 94761 N-INVAS EAR/PLS OXIMETRY MLT: CPT

## 2019-10-03 PROCEDURE — 6370000000 HC RX 637 (ALT 250 FOR IP): Performed by: SURGERY

## 2019-10-03 PROCEDURE — C9254 INJECTION, LACOSAMIDE: HCPCS | Performed by: PSYCHIATRY & NEUROLOGY

## 2019-10-03 PROCEDURE — 6360000002 HC RX W HCPCS: Performed by: SPECIALIST

## 2019-10-03 PROCEDURE — 2580000003 HC RX 258: Performed by: HOSPITALIST

## 2019-10-03 PROCEDURE — 6360000002 HC RX W HCPCS: Performed by: HOSPITALIST

## 2019-10-03 PROCEDURE — 2500000003 HC RX 250 WO HCPCS: Performed by: SURGERY

## 2019-10-03 PROCEDURE — 2580000003 HC RX 258: Performed by: PSYCHIATRY & NEUROLOGY

## 2019-10-03 PROCEDURE — 85018 HEMOGLOBIN: CPT

## 2019-10-03 PROCEDURE — 85014 HEMATOCRIT: CPT

## 2019-10-03 RX ORDER — BUSPIRONE HYDROCHLORIDE 10 MG/1
10 TABLET ORAL 3 TIMES DAILY
Qty: 90 TABLET | Refills: 1 | Status: SHIPPED | OUTPATIENT
Start: 2019-10-03 | End: 2019-11-02

## 2019-10-03 RX ORDER — HEPARIN SODIUM (PORCINE) LOCK FLUSH IV SOLN 100 UNIT/ML 100 UNIT/ML
500 SOLUTION INTRAVENOUS PRN
Status: DISCONTINUED | OUTPATIENT
Start: 2019-10-03 | End: 2019-10-03 | Stop reason: HOSPADM

## 2019-10-03 RX ORDER — LACOSAMIDE 50 MG/1
50 TABLET ORAL 2 TIMES DAILY
Qty: 28 TABLET | Refills: 0 | Status: ON HOLD | OUTPATIENT
Start: 2019-10-03 | End: 2020-07-03

## 2019-10-03 RX ORDER — LEVETIRACETAM 1000 MG/1
1000 TABLET ORAL 2 TIMES DAILY
Qty: 60 TABLET | Refills: 3 | Status: ON HOLD | OUTPATIENT
Start: 2019-10-03 | End: 2020-07-03

## 2019-10-03 RX ADMIN — OXYCODONE HYDROCHLORIDE AND ACETAMINOPHEN 2 TABLET: 5; 325 TABLET ORAL at 12:44

## 2019-10-03 RX ADMIN — TIZANIDINE 4 MG: 4 TABLET ORAL at 01:48

## 2019-10-03 RX ADMIN — SODIUM CHLORIDE 1000 ML: 9 INJECTION, SOLUTION INTRAVENOUS at 09:05

## 2019-10-03 RX ADMIN — VITAMIN D, TAB 1000IU (100/BT) 5000 UNITS: 25 TAB at 09:07

## 2019-10-03 RX ADMIN — SODIUM CHLORIDE, PRESERVATIVE FREE 500 UNITS: 5 INJECTION INTRAVENOUS at 14:51

## 2019-10-03 RX ADMIN — ATENOLOL 25 MG: 25 TABLET ORAL at 09:07

## 2019-10-03 RX ADMIN — PROMETHAZINE HYDROCHLORIDE 12.5 MG: 12.5 TABLET ORAL at 01:47

## 2019-10-03 RX ADMIN — BUSPIRONE HYDROCHLORIDE 10 MG: 10 TABLET ORAL at 12:44

## 2019-10-03 RX ADMIN — FAMOTIDINE 40 MG: 10 INJECTION, SOLUTION INTRAVENOUS at 09:07

## 2019-10-03 RX ADMIN — GABAPENTIN 800 MG: 400 CAPSULE ORAL at 12:44

## 2019-10-03 RX ADMIN — DEXTROSE MONOHYDRATE 100 MG: 50 INJECTION, SOLUTION INTRAVENOUS at 03:45

## 2019-10-03 RX ADMIN — LEVOTHYROXINE SODIUM 125 MCG: 125 TABLET ORAL at 06:26

## 2019-10-03 RX ADMIN — PIPERACILLIN AND TAZOBACTAM 3.38 G: 3; .375 INJECTION, POWDER, LYOPHILIZED, FOR SOLUTION INTRAVENOUS at 06:17

## 2019-10-03 RX ADMIN — PANTOPRAZOLE SODIUM 40 MG: 40 INJECTION, POWDER, FOR SOLUTION INTRAVENOUS at 09:07

## 2019-10-03 RX ADMIN — MULTIPLE VITAMINS W/ MINERALS TAB 1 TABLET: TAB at 09:07

## 2019-10-03 RX ADMIN — OXYCODONE HYDROCHLORIDE AND ACETAMINOPHEN 2 TABLET: 5; 325 TABLET ORAL at 06:18

## 2019-10-03 RX ADMIN — GABAPENTIN 800 MG: 400 CAPSULE ORAL at 09:07

## 2019-10-03 RX ADMIN — CALCIUM CARBONATE-CHOLECALCIFEROL TAB 250 MG-125 UNIT 250 MG: 250-125 TAB at 09:07

## 2019-10-03 RX ADMIN — BUSPIRONE HYDROCHLORIDE 10 MG: 10 TABLET ORAL at 09:07

## 2019-10-03 ASSESSMENT — PAIN DESCRIPTION - PAIN TYPE
TYPE: ACUTE PAIN
TYPE: ACUTE PAIN

## 2019-10-03 ASSESSMENT — PAIN DESCRIPTION - PROGRESSION
CLINICAL_PROGRESSION: NOT CHANGED

## 2019-10-03 ASSESSMENT — PAIN DESCRIPTION - LOCATION
LOCATION: HEAD
LOCATION: OTHER (COMMENT)

## 2019-10-03 ASSESSMENT — PAIN SCALES - GENERAL
PAINLEVEL_OUTOF10: 6
PAINLEVEL_OUTOF10: 2
PAINLEVEL_OUTOF10: 4
PAINLEVEL_OUTOF10: 4

## 2019-10-03 ASSESSMENT — PAIN DESCRIPTION - ORIENTATION: ORIENTATION: MID

## 2019-10-03 ASSESSMENT — PAIN DESCRIPTION - DESCRIPTORS: DESCRIPTORS: HEADACHE

## 2019-10-04 LAB
CULTURE: NORMAL
CULTURE: NORMAL
Lab: NORMAL
Lab: NORMAL
SPECIMEN: NORMAL
SPECIMEN: NORMAL

## 2019-10-13 ENCOUNTER — HOSPITAL ENCOUNTER (INPATIENT)
Age: 49
LOS: 1 days | Discharge: HOME | DRG: 378 | End: 2019-10-16
Attending: EMERGENCY MEDICINE | Admitting: FAMILY MEDICINE
Payer: COMMERCIAL

## 2019-10-13 ENCOUNTER — APPOINTMENT (OUTPATIENT)
Dept: CT IMAGING | Age: 49
DRG: 378 | End: 2019-10-13
Payer: COMMERCIAL

## 2019-10-13 LAB
ALBUMIN SERPL-MCNC: 4.4 GM/DL (ref 3.4–5)
ALP BLD-CCNC: 137 IU/L (ref 40–129)
ALT SERPL-CCNC: 18 U/L (ref 10–40)
ANION GAP SERPL CALCULATED.3IONS-SCNC: 7 MMOL/L (ref 4–16)
AST SERPL-CCNC: 20 IU/L (ref 15–37)
BACTERIA: NEGATIVE /HPF
BASOPHILS ABSOLUTE: 0 K/CU MM
BASOPHILS RELATIVE PERCENT: 0.5 % (ref 0–1)
BILIRUB SERPL-MCNC: 0.2 MG/DL (ref 0–1)
BILIRUBIN URINE: ABNORMAL MG/DL
BLOOD, URINE: NEGATIVE
BUN BLDV-MCNC: 21 MG/DL (ref 6–23)
CALCIUM SERPL-MCNC: 11 MG/DL (ref 8.3–10.6)
CHLORIDE BLD-SCNC: 103 MMOL/L (ref 99–110)
CLARITY: ABNORMAL
CO2: 30 MMOL/L (ref 21–32)
COLOR: YELLOW
CREAT SERPL-MCNC: 0.9 MG/DL (ref 0.6–1.1)
DIFFERENTIAL TYPE: ABNORMAL
EOSINOPHILS ABSOLUTE: 0.4 K/CU MM
EOSINOPHILS RELATIVE PERCENT: 6.4 % (ref 0–3)
GFR AFRICAN AMERICAN: >60 ML/MIN/1.73M2
GFR NON-AFRICAN AMERICAN: >60 ML/MIN/1.73M2
GLUCOSE BLD-MCNC: 90 MG/DL (ref 70–99)
GLUCOSE, URINE: NEGATIVE MG/DL
HCT VFR BLD CALC: 37.1 % (ref 37–47)
HCT VFR BLD CALC: 37.3 % (ref 37–47)
HEMOGLOBIN: 10.8 GM/DL (ref 12.5–16)
HEMOGLOBIN: 11.1 GM/DL (ref 12.5–16)
HYALINE CASTS: 2 /LPF
ICTOTEST: NEGATIVE
IMMATURE NEUTROPHIL %: 0 % (ref 0–0.43)
KETONES, URINE: NEGATIVE MG/DL
LEUKOCYTE ESTERASE, URINE: ABNORMAL
LIPASE: 15 IU/L (ref 13–60)
LYMPHOCYTES ABSOLUTE: 2.3 K/CU MM
LYMPHOCYTES RELATIVE PERCENT: 42.4 % (ref 24–44)
MCH RBC QN AUTO: 23.5 PG (ref 27–31)
MCHC RBC AUTO-ENTMCNC: 29.8 % (ref 32–36)
MCV RBC AUTO: 79 FL (ref 78–100)
MONOCYTES ABSOLUTE: 0.3 K/CU MM
MONOCYTES RELATIVE PERCENT: 6 % (ref 0–4)
MUCUS: ABNORMAL HPF
NITRITE URINE, QUANTITATIVE: NEGATIVE
NUCLEATED RBC %: 0 %
PDW BLD-RTO: 17.3 % (ref 11.7–14.9)
PH, URINE: 5 (ref 5–8)
PLATELET # BLD: 250 K/CU MM (ref 140–440)
PMV BLD AUTO: 10.3 FL (ref 7.5–11.1)
POTASSIUM SERPL-SCNC: 4 MMOL/L (ref 3.5–5.1)
PROTEIN UA: NEGATIVE MG/DL
RBC # BLD: 4.72 M/CU MM (ref 4.2–5.4)
RBC URINE: 1 /HPF (ref 0–6)
SEGMENTED NEUTROPHILS ABSOLUTE COUNT: 2.5 K/CU MM
SEGMENTED NEUTROPHILS RELATIVE PERCENT: 44.7 % (ref 36–66)
SODIUM BLD-SCNC: 140 MMOL/L (ref 135–145)
SPECIFIC GRAVITY UA: 1.02 (ref 1–1.03)
SQUAMOUS EPITHELIAL: 4 /HPF
TOTAL IMMATURE NEUTOROPHIL: 0 K/CU MM
TOTAL NUCLEATED RBC: 0 K/CU MM
TOTAL PROTEIN: 7.3 GM/DL (ref 6.4–8.2)
TRICHOMONAS: ABNORMAL /HPF
UROBILINOGEN, URINE: NORMAL MG/DL (ref 0.2–1)
WBC # BLD: 5.5 K/CU MM (ref 4–10.5)
WBC UA: 3 /HPF (ref 0–5)

## 2019-10-13 PROCEDURE — 85014 HEMATOCRIT: CPT

## 2019-10-13 PROCEDURE — C9113 INJ PANTOPRAZOLE SODIUM, VIA: HCPCS | Performed by: EMERGENCY MEDICINE

## 2019-10-13 PROCEDURE — G0378 HOSPITAL OBSERVATION PER HR: HCPCS

## 2019-10-13 PROCEDURE — 99221 1ST HOSP IP/OBS SF/LOW 40: CPT | Performed by: SURGERY

## 2019-10-13 PROCEDURE — 2580000003 HC RX 258: Performed by: EMERGENCY MEDICINE

## 2019-10-13 PROCEDURE — 96361 HYDRATE IV INFUSION ADD-ON: CPT

## 2019-10-13 PROCEDURE — 80053 COMPREHEN METABOLIC PANEL: CPT

## 2019-10-13 PROCEDURE — C9113 INJ PANTOPRAZOLE SODIUM, VIA: HCPCS | Performed by: FAMILY MEDICINE

## 2019-10-13 PROCEDURE — 36415 COLL VENOUS BLD VENIPUNCTURE: CPT

## 2019-10-13 PROCEDURE — 96376 TX/PRO/DX INJ SAME DRUG ADON: CPT

## 2019-10-13 PROCEDURE — 86900 BLOOD TYPING SEROLOGIC ABO: CPT

## 2019-10-13 PROCEDURE — 2580000003 HC RX 258: Performed by: FAMILY MEDICINE

## 2019-10-13 PROCEDURE — 96375 TX/PRO/DX INJ NEW DRUG ADDON: CPT

## 2019-10-13 PROCEDURE — 6360000002 HC RX W HCPCS: Performed by: EMERGENCY MEDICINE

## 2019-10-13 PROCEDURE — 81001 URINALYSIS AUTO W/SCOPE: CPT

## 2019-10-13 PROCEDURE — 86922 COMPATIBILITY TEST ANTIGLOB: CPT

## 2019-10-13 PROCEDURE — 96365 THER/PROPH/DIAG IV INF INIT: CPT

## 2019-10-13 PROCEDURE — 86905 BLOOD TYPING RBC ANTIGENS: CPT

## 2019-10-13 PROCEDURE — 96374 THER/PROPH/DIAG INJ IV PUSH: CPT

## 2019-10-13 PROCEDURE — 6370000000 HC RX 637 (ALT 250 FOR IP): Performed by: FAMILY MEDICINE

## 2019-10-13 PROCEDURE — 86901 BLOOD TYPING SEROLOGIC RH(D): CPT

## 2019-10-13 PROCEDURE — 74177 CT ABD & PELVIS W/CONTRAST: CPT

## 2019-10-13 PROCEDURE — 6360000004 HC RX CONTRAST MEDICATION: Performed by: EMERGENCY MEDICINE

## 2019-10-13 PROCEDURE — 96372 THER/PROPH/DIAG INJ SC/IM: CPT

## 2019-10-13 PROCEDURE — 2500000003 HC RX 250 WO HCPCS: Performed by: FAMILY MEDICINE

## 2019-10-13 PROCEDURE — 6360000002 HC RX W HCPCS: Performed by: FAMILY MEDICINE

## 2019-10-13 PROCEDURE — 96366 THER/PROPH/DIAG IV INF ADDON: CPT

## 2019-10-13 PROCEDURE — 83690 ASSAY OF LIPASE: CPT

## 2019-10-13 PROCEDURE — 86850 RBC ANTIBODY SCREEN: CPT

## 2019-10-13 PROCEDURE — 85018 HEMOGLOBIN: CPT

## 2019-10-13 PROCEDURE — 85025 COMPLETE CBC W/AUTO DIFF WBC: CPT

## 2019-10-13 PROCEDURE — 99285 EMERGENCY DEPT VISIT HI MDM: CPT

## 2019-10-13 RX ORDER — ONDANSETRON 4 MG/1
8 TABLET, ORALLY DISINTEGRATING ORAL EVERY 8 HOURS PRN
Status: DISCONTINUED | OUTPATIENT
Start: 2019-10-13 | End: 2019-10-16 | Stop reason: HOSPADM

## 2019-10-13 RX ORDER — LEVETIRACETAM 500 MG/1
1000 TABLET ORAL 2 TIMES DAILY
Status: DISCONTINUED | OUTPATIENT
Start: 2019-10-13 | End: 2019-10-16 | Stop reason: HOSPADM

## 2019-10-13 RX ORDER — ATENOLOL 25 MG/1
25 TABLET ORAL DAILY
Status: DISCONTINUED | OUTPATIENT
Start: 2019-10-13 | End: 2019-10-16 | Stop reason: HOSPADM

## 2019-10-13 RX ORDER — ONDANSETRON 2 MG/ML
4 INJECTION INTRAMUSCULAR; INTRAVENOUS EVERY 30 MIN PRN
Status: DISCONTINUED | OUTPATIENT
Start: 2019-10-13 | End: 2019-10-13

## 2019-10-13 RX ORDER — CEFUROXIME AXETIL 250 MG/1
500 TABLET ORAL 2 TIMES DAILY
Status: DISCONTINUED | OUTPATIENT
Start: 2019-10-13 | End: 2019-10-16 | Stop reason: HOSPADM

## 2019-10-13 RX ORDER — M-VIT,TX,IRON,MINS/CALC/FOLIC 27MG-0.4MG
1 TABLET ORAL DAILY
Status: DISCONTINUED | OUTPATIENT
Start: 2019-10-14 | End: 2019-10-16 | Stop reason: HOSPADM

## 2019-10-13 RX ORDER — 0.9 % SODIUM CHLORIDE 0.9 %
10 VIAL (ML) INJECTION
Status: COMPLETED | OUTPATIENT
Start: 2019-10-13 | End: 2019-10-13

## 2019-10-13 RX ORDER — PANTOPRAZOLE SODIUM 40 MG/10ML
40 INJECTION, POWDER, LYOPHILIZED, FOR SOLUTION INTRAVENOUS ONCE
Status: COMPLETED | OUTPATIENT
Start: 2019-10-13 | End: 2019-10-13

## 2019-10-13 RX ORDER — DICYCLOMINE HCL 20 MG
20 TABLET ORAL EVERY 6 HOURS
Status: DISCONTINUED | OUTPATIENT
Start: 2019-10-13 | End: 2019-10-16 | Stop reason: HOSPADM

## 2019-10-13 RX ORDER — PROMETHAZINE HYDROCHLORIDE 12.5 MG/1
12.5 TABLET ORAL EVERY 8 HOURS PRN
Status: DISCONTINUED | OUTPATIENT
Start: 2019-10-13 | End: 2019-10-16 | Stop reason: HOSPADM

## 2019-10-13 RX ORDER — FEEDER CONTAINER WITH PUMP SET
1 EACH MISCELLANEOUS
Status: DISCONTINUED | OUTPATIENT
Start: 2019-10-13 | End: 2019-10-13 | Stop reason: CLARIF

## 2019-10-13 RX ORDER — DEXTROSE, SODIUM CHLORIDE, AND POTASSIUM CHLORIDE 5; .45; .15 G/100ML; G/100ML; G/100ML
INJECTION INTRAVENOUS CONTINUOUS
Status: DISCONTINUED | OUTPATIENT
Start: 2019-10-13 | End: 2019-10-16 | Stop reason: HOSPADM

## 2019-10-13 RX ORDER — PANTOPRAZOLE SODIUM 40 MG/1
40 TABLET, DELAYED RELEASE ORAL
Status: DISCONTINUED | OUTPATIENT
Start: 2019-10-13 | End: 2019-10-13

## 2019-10-13 RX ORDER — PROMETHAZINE HYDROCHLORIDE 25 MG/1
12.5 TABLET ORAL EVERY 6 HOURS PRN
Status: DISCONTINUED | OUTPATIENT
Start: 2019-10-13 | End: 2019-10-13

## 2019-10-13 RX ORDER — SODIUM CHLORIDE 9 MG/ML
INJECTION, SOLUTION INTRAVENOUS CONTINUOUS
Status: DISCONTINUED | OUTPATIENT
Start: 2019-10-13 | End: 2019-10-16 | Stop reason: HOSPADM

## 2019-10-13 RX ORDER — ONDANSETRON 4 MG/1
4 TABLET, ORALLY DISINTEGRATING ORAL EVERY 8 HOURS PRN
Status: DISCONTINUED | OUTPATIENT
Start: 2019-10-13 | End: 2019-10-13

## 2019-10-13 RX ORDER — PANTOPRAZOLE SODIUM 40 MG/1
40 TABLET, DELAYED RELEASE ORAL
Status: DISCONTINUED | OUTPATIENT
Start: 2019-10-15 | End: 2019-10-13 | Stop reason: SDUPTHER

## 2019-10-13 RX ORDER — SODIUM CHLORIDE 0.9 % (FLUSH) 0.9 %
10 SYRINGE (ML) INJECTION EVERY 12 HOURS SCHEDULED
Status: DISCONTINUED | OUTPATIENT
Start: 2019-10-13 | End: 2019-10-16 | Stop reason: HOSPADM

## 2019-10-13 RX ORDER — FERROUS SULFATE 325(65) MG
325 TABLET ORAL 2 TIMES DAILY WITH MEALS
Status: DISCONTINUED | OUTPATIENT
Start: 2019-10-13 | End: 2019-10-16 | Stop reason: HOSPADM

## 2019-10-13 RX ORDER — DIPHENHYDRAMINE HYDROCHLORIDE 50 MG/ML
50 INJECTION INTRAMUSCULAR; INTRAVENOUS ONCE
Status: COMPLETED | OUTPATIENT
Start: 2019-10-13 | End: 2019-10-13

## 2019-10-13 RX ORDER — MORPHINE SULFATE 4 MG/ML
4 INJECTION, SOLUTION INTRAMUSCULAR; INTRAVENOUS ONCE
Status: COMPLETED | OUTPATIENT
Start: 2019-10-13 | End: 2019-10-13

## 2019-10-13 RX ORDER — MORPHINE SULFATE 4 MG/ML
2 INJECTION, SOLUTION INTRAMUSCULAR; INTRAVENOUS EVERY 4 HOURS PRN
Status: DISCONTINUED | OUTPATIENT
Start: 2019-10-13 | End: 2019-10-16 | Stop reason: HOSPADM

## 2019-10-13 RX ORDER — BUSPIRONE HYDROCHLORIDE 10 MG/1
10 TABLET ORAL 3 TIMES DAILY
Status: DISCONTINUED | OUTPATIENT
Start: 2019-10-13 | End: 2019-10-16 | Stop reason: HOSPADM

## 2019-10-13 RX ORDER — LEVOTHYROXINE SODIUM 0.12 MG/1
125 TABLET ORAL DAILY
Status: DISCONTINUED | OUTPATIENT
Start: 2019-10-14 | End: 2019-10-16 | Stop reason: HOSPADM

## 2019-10-13 RX ORDER — ESTRADIOL 1 MG/1
0.5 TABLET ORAL DAILY
Status: DISCONTINUED | OUTPATIENT
Start: 2019-10-14 | End: 2019-10-16 | Stop reason: HOSPADM

## 2019-10-13 RX ORDER — TIZANIDINE 4 MG/1
4 TABLET ORAL 2 TIMES DAILY
Status: DISCONTINUED | OUTPATIENT
Start: 2019-10-13 | End: 2019-10-16 | Stop reason: HOSPADM

## 2019-10-13 RX ORDER — OYSTER SHELL CALCIUM WITH VITAMIN D 500; 200 MG/1; [IU]/1
1 TABLET, FILM COATED ORAL 2 TIMES DAILY WITH MEALS
Status: DISCONTINUED | OUTPATIENT
Start: 2019-10-13 | End: 2019-10-13 | Stop reason: CLARIF

## 2019-10-13 RX ORDER — PROMETHAZINE HYDROCHLORIDE 25 MG/ML
25 INJECTION, SOLUTION INTRAMUSCULAR; INTRAVENOUS ONCE
Status: COMPLETED | OUTPATIENT
Start: 2019-10-13 | End: 2019-10-13

## 2019-10-13 RX ORDER — OXYCODONE HYDROCHLORIDE AND ACETAMINOPHEN 5; 325 MG/1; MG/1
1 TABLET ORAL EVERY 8 HOURS PRN
Status: DISCONTINUED | OUTPATIENT
Start: 2019-10-13 | End: 2019-10-16 | Stop reason: HOSPADM

## 2019-10-13 RX ORDER — GABAPENTIN 400 MG/1
800 CAPSULE ORAL 3 TIMES DAILY
Status: DISCONTINUED | OUTPATIENT
Start: 2019-10-13 | End: 2019-10-16 | Stop reason: HOSPADM

## 2019-10-13 RX ORDER — LACOSAMIDE 50 MG/1
50 TABLET ORAL 2 TIMES DAILY
Status: DISCONTINUED | OUTPATIENT
Start: 2019-10-13 | End: 2019-10-16 | Stop reason: HOSPADM

## 2019-10-13 RX ORDER — ACETAMINOPHEN 325 MG/1
650 TABLET ORAL EVERY 8 HOURS SCHEDULED
Status: DISCONTINUED | OUTPATIENT
Start: 2019-10-13 | End: 2019-10-16 | Stop reason: HOSPADM

## 2019-10-13 RX ORDER — PAROXETINE HYDROCHLORIDE 20 MG/1
40 TABLET, FILM COATED ORAL NIGHTLY
Status: DISCONTINUED | OUTPATIENT
Start: 2019-10-13 | End: 2019-10-16 | Stop reason: HOSPADM

## 2019-10-13 RX ORDER — SODIUM CHLORIDE 0.9 % (FLUSH) 0.9 %
10 SYRINGE (ML) INJECTION PRN
Status: DISCONTINUED | OUTPATIENT
Start: 2019-10-13 | End: 2019-10-16 | Stop reason: HOSPADM

## 2019-10-13 RX ADMIN — TIZANIDINE 4 MG: 4 TABLET ORAL at 23:43

## 2019-10-13 RX ADMIN — MORPHINE SULFATE 4 MG: 4 INJECTION, SOLUTION INTRAMUSCULAR; INTRAVENOUS at 07:50

## 2019-10-13 RX ADMIN — SODIUM CHLORIDE 10 ML: 9 INJECTION, SOLUTION INTRAMUSCULAR; INTRAVENOUS; SUBCUTANEOUS at 10:15

## 2019-10-13 RX ADMIN — MORPHINE SULFATE 4 MG: 4 INJECTION, SOLUTION INTRAMUSCULAR; INTRAVENOUS at 09:53

## 2019-10-13 RX ADMIN — IOPAMIDOL 75 ML: 755 INJECTION, SOLUTION INTRAVENOUS at 10:15

## 2019-10-13 RX ADMIN — SODIUM CHLORIDE: 9 INJECTION, SOLUTION INTRAVENOUS at 07:50

## 2019-10-13 RX ADMIN — GABAPENTIN 800 MG: 400 CAPSULE ORAL at 23:44

## 2019-10-13 RX ADMIN — DIPHENHYDRAMINE HYDROCHLORIDE 50 MG: 50 INJECTION, SOLUTION INTRAMUSCULAR; INTRAVENOUS at 09:50

## 2019-10-13 RX ADMIN — MORPHINE SULFATE 2 MG: 4 INJECTION, SOLUTION INTRAMUSCULAR; INTRAVENOUS at 15:36

## 2019-10-13 RX ADMIN — SODIUM CHLORIDE: 9 INJECTION, SOLUTION INTRAVENOUS at 17:57

## 2019-10-13 RX ADMIN — PANTOPRAZOLE SODIUM 40 MG: 40 INJECTION, POWDER, FOR SOLUTION INTRAVENOUS at 09:30

## 2019-10-13 RX ADMIN — PROMETHAZINE HYDROCHLORIDE 12.5 MG: 12.5 TABLET ORAL at 23:43

## 2019-10-13 RX ADMIN — MORPHINE SULFATE 2 MG: 4 INJECTION, SOLUTION INTRAMUSCULAR; INTRAVENOUS at 23:43

## 2019-10-13 RX ADMIN — MORPHINE SULFATE 2 MG: 4 INJECTION, SOLUTION INTRAMUSCULAR; INTRAVENOUS at 19:36

## 2019-10-13 RX ADMIN — POTASSIUM CHLORIDE, DEXTROSE MONOHYDRATE AND SODIUM CHLORIDE: 150; 5; 450 INJECTION, SOLUTION INTRAVENOUS at 15:33

## 2019-10-13 RX ADMIN — ONDANSETRON 4 MG: 2 INJECTION INTRAMUSCULAR; INTRAVENOUS at 07:50

## 2019-10-13 RX ADMIN — ONDANSETRON 4 MG: 2 INJECTION INTRAMUSCULAR; INTRAVENOUS at 15:36

## 2019-10-13 RX ADMIN — PROMETHAZINE HYDROCHLORIDE 25 MG: 25 INJECTION INTRAMUSCULAR; INTRAVENOUS at 09:46

## 2019-10-13 RX ADMIN — SODIUM CHLORIDE 8 MG/HR: 9 INJECTION, SOLUTION INTRAVENOUS at 17:57

## 2019-10-13 ASSESSMENT — PAIN DESCRIPTION - DESCRIPTORS
DESCRIPTORS: CRAMPING;HEAVINESS
DESCRIPTORS: SHARP

## 2019-10-13 ASSESSMENT — PAIN DESCRIPTION - LOCATION
LOCATION: ABDOMEN;FLANK
LOCATION: ABDOMEN
LOCATION: ABDOMEN;FLANK
LOCATION: ABDOMEN

## 2019-10-13 ASSESSMENT — PAIN DESCRIPTION - ORIENTATION
ORIENTATION: RIGHT
ORIENTATION: MID;RIGHT

## 2019-10-13 ASSESSMENT — PAIN DESCRIPTION - PROGRESSION
CLINICAL_PROGRESSION: GRADUALLY WORSENING

## 2019-10-13 ASSESSMENT — PAIN DESCRIPTION - FREQUENCY
FREQUENCY: CONTINUOUS

## 2019-10-13 ASSESSMENT — PAIN DESCRIPTION - PAIN TYPE
TYPE: ACUTE PAIN;CHRONIC PAIN
TYPE: ACUTE PAIN

## 2019-10-13 ASSESSMENT — PAIN SCALES - GENERAL
PAINLEVEL_OUTOF10: 7
PAINLEVEL_OUTOF10: 7
PAINLEVEL_OUTOF10: 8
PAINLEVEL_OUTOF10: 7
PAINLEVEL_OUTOF10: 8
PAINLEVEL_OUTOF10: 7
PAINLEVEL_OUTOF10: 8

## 2019-10-13 ASSESSMENT — PAIN DESCRIPTION - ONSET
ONSET: ON-GOING
ONSET: PROGRESSIVE

## 2019-10-13 NOTE — ED PROVIDER NOTES
valvular structures and function.  H/O echocardiogram 08/30/2018    EF 55-60%    Hiatal hernia     History of blood transfusion 09/2015 And 2018    No Reaction To Blood Transfusions Received    [place] (hard of hearing)     Right Ear    Hx of cardiac catheterization 10/24/2010    Angiographically normal coronary arteries w/ normal LV function and wall motion.  Hx of transesophageal echocardiography (PILO) for monitoring 12/2/2010    EF 55-60%. WNL.    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 name    Lupus [facility]) Dx 2013    \"Rheumatoid Lupus\"    Morbid obesity ([address]     Nausea     \"Most Of The Time\"    Pain management     Sees Dr. Sara Hernandez, Once A Month    Pancreatitis chronic Dx 2001    Pneumonia Dx 11-15    Shortness of breath on exertion     Shortness of breath on exertion     Sleep apnea     Has CPAP\"no longer use the cpap since lost weight\"    Staph infection Dx 11-15    Left Foot    Staph infection Dx 11-15    \"Left Foot\"    Teeth missing     Upper And Lower    Thyroid disease     UTI (urinary tract infection) In Past    No Current Symptoms    Wears glasses     To Read     Past Surgical History:   Procedure Laterality Date    APPENDECTOMY  02/1998    Done When Tubes And Ovaries Were Removed    CARDIAC CATHETERIZATION  10/24/2010    CATHETER REMOVAL N/A 7/16/2019    PORT REMOVAL performed by Tashi Zimmer MD at [facility]  08/27/1991    CHOLECYSTECTOMY, LAPAROSCOPIC  1990's    COLONOSCOPY  Last Done In 2000's    DENTAL SURGERY      Teeth Extracted In Past    ENDOSCOPY, COLON, DIAGNOSTIC  Last Done In 2018    FOOT DEBRIDEMENT Left 6/16/2019    FIRST METATARSAL DEBRIDEMENT INCISION AND DRAINAGE. EXCISION OF ULCER.  RESECTION OF BONE. 1ST METATARSAL POWER LAVAGE AND BONE CEMENT performed by Deedee Covarrubias DPM at [facility] Left Last Done In 2016     Dr. Kassi Schwartz, \" About 6 Surgeries Done Because Of Staph Infection\"    HIATAL HERNIA REPAIR N/A 2/12/2019    HERNIA HIATAL LAPAROSCOPIC ROBOTIC performed by William So MD at [facility]  10/1997    Partial Abdominal Hysterectomy    INSERTION / REMOVAL / REPLACEMENT VENOUS ACCESS CATHETER N/A 8/15/2019    PORT INSERTION performed by William So MD at [address]  ~2000    removed after 6 months    LUNG REMOVAL, PARTIAL Left 2008    Benign    OTHER SURGICAL HISTORY  02/1998    \"Tubes And Ovaries Removed, Appendectomy Also Done\"    OTHER SURGICAL HISTORY  Last Done 7-15-16    Mediport Insertion \"Total Of Six Done, Removed Last Mediport In 2014\"    SLEEVE GASTRECTOMY N/A 2/12/2019    GASTRECTOMY SLEEVE LAPAROSCOPIC ROBOTIC performed by William So MD at [address] [place] [address] ENDOSCOPY  08/27/2018    UPPER GASTROINTESTINAL ENDOSCOPY N/A 4/2/2019    EGD CONTROL HEMORRHAGE with epi injection at bleeding site performed by William So MD at [employer] N/A 6/19/2019    EGD DIAGNOSTIC ONLY performed by William So MD at [employer] N/A 7/22/2019    EGD DIAGNOSTIC ONLY performed by Renuka Thornton MD at [facility] ENDOSCOPY     Family History   Problem Relation Age of Onset    Diabetes Mother         \"Borderline Diabetes\"    High Blood Pressure Mother     Obesity Mother    Malcolm Novoa Arthritis Mother     Heart Disease Mother     High Cholesterol Mother     Vision Loss Mother     Diabetes Father     High Blood Pressure Father     Asthma Father     Cancer Father         prostate cancer    High Blood Pressure Brother     Asthma Son     Vision Loss Son  Lupus Daughter     Other Daughter         \"Alot Of Female Problems\"     Social History     Socioeconomic History    Marital status:      Spouse name: Not on file    Number of children: Not on file    Years of education: Not on file    Highest education level: Not on file   Occupational History    Not on file   Social Needs    Financial resource strain: Not on file    Food insecurity:     Worry: Not on file     Inability: Not on file    Transportation needs:     Medical: Not on file     Non-medical: Not on file   Tobacco Use    Smoking status: Never Smoker    Smokeless tobacco: Never Used   Substance and Sexual Activity    Alcohol use: No     Alcohol/week: 0.0 standard drinks    Drug use: No    Sexual activity: Not Currently   Lifestyle    Physical activity:     Days per week: Not on file     Minutes per session: Not on file    Stress: Not on file   Relationships    Social connections:     Talks on phone: Not on file     Gets together: Not on file     Attends Sabianism service: Not on file     Active member of club or organization: Not on file     Attends meetings of clubs or organizations: Not on file     Relationship status: Not on file    Intimate partner violence:     Fear of current or ex partner: Not on file     Emotionally abused: Not on file     Physically abused: Not on file     Forced sexual activity: Not on file   Other Topics Concern    Not on file   Social History Narrative    Not on file     Current Facility-Administered Medications   Medication Dose Route Frequency Provider Last Rate Last Dose    0.9 % sodium chloride infusion   Intravenous Continuous Janene Ozuna  mL/hr at 10/13/19 0750      ondansetron (ZOFRAN) injection 4 mg  4 mg Intravenous Q30 Min PRN Janene Ozuna MD   4 mg at 10/13/19 0750     Current Outpatient Medications   Medication Sig Dispense Refill    busPIRone (BUSPAR) 10 MG tablet Take 1 tablet by mouth 3 times daily 90 tablet 1    levETIRAcetam (KEPPRA) 1000 MG tablet Take 1 tablet by mouth 2 times daily 60 tablet 3    lacosamide (VIMPAT) 50 MG TABS tablet Take 1 tablet by mouth 2 times daily for 14 days. 28 tablet 0    oxyCODONE-acetaminophen (PERCOCET) 5-325 MG per tablet Take 1 tablet by mouth every 8 hours as needed for Pain.        promethazine (PHENERGAN) 25 MG tablet Take 0.5 tablets by mouth every 6 hours as needed for Nausea 35 tablet 3    cefUROXime (CEFTIN) 500 MG tablet Take 1 tablet by mouth 2 times daily Start on 7/29/19 112 tablet 0    Nutritional Supplements (ENSURE HIGH PROTEIN) LIQD Take 1 Can by mouth Daily with lunch 32 Can 3    ondansetron (ZOFRAN ODT) 4 MG disintegrating tablet Take 1 tablet by mouth every 8 hours as needed for Nausea or Vomiting 30 tablet 2    pantoprazole (PROTONIX) 20 MG tablet Take 2 tablets by mouth 2 times daily 60 tablet 5    Cholecalciferol (VITAMIN D3) 5000 units TABS Take 1 tablet by mouth daily      ferrous sulfate (FE TABS) 325 (65 Fe) MG EC tablet Take 1 tablet by mouth 2 times daily 60 tablet 5    tiZANidine (ZANAFLEX) 4 MG tablet Take 4 mg by mouth 2 times daily      estradiol (ESTRACE) 0.5 MG tablet Take 0.5 mg by mouth daily      dicyclomine (BENTYL) 20 MG tablet Take 20 mg by mouth every 6 hours      atenolol (TENORMIN) 25 MG tablet Take 25 mg by mouth daily      Multiple Vitamin (MVI, BARIATRIC ADVANTAGE MULTI-FORMULA, CHEW TAB) Take 1 tablet by mouth daily 30 tablet 5    Calcium Citrate-Vitamin D (CALCIUM + VIT D, BARIATRIC ADVANTAGE, CHEWABLE TABLET) Take 1 tablet by mouth 2 times daily 120 tablet 5    levothyroxine (SYNTHROID) 125 MCG tablet Take 1 tablet by mouth Daily (Patient taking differently: Take 125 mcg by mouth nightly ) 30 tablet 0    gabapentin (NEURONTIN) 800 MG tablet Take 800 mg by mouth 3 times daily      PARoxetine (PAXIL) 30 MG tablet Take 40 mg by mouth nightly        Allergies   Allergen Reactions    Aspirin Palpitations     \"My Heart Rate Elevates\"    Compazine [Prochlorperazine Maleate] Rash    Reglan [Metoclopramide Hcl] Rash    Shellfish-Derived Products Swelling    Toradol [Ketorolac Tromethamine] Rash     Nursing Notes Reviewed    ROS:  At least 10 systems reviewed and otherwise negative except as in the [address] Physical Exam:  ED Triage Vitals   Enc Vitals Group      BP 10/13/19 0610 (!) 112/54      Pulse 10/13/19 0610 60      Resp 10/13/19 0610 16      Temp 10/13/19 0610 98 °F (36.7 °C)      Temp Source 10/13/19 0610 Oral      SpO2 10/13/19 0610 98 %      Weight 10/13/19 0625 258 lb (117 kg)      Height 10/13/19 0625 5' 4\" (1.626 m)      Head Circumference --       Peak Flow --       Pain Score --       Pain Loc --       Pain Edu? --       Excl. in [address]? --      My pulse oximetry interpretation is which is within the normal range    GENERAL APPEARANCE: Awake and alert. Cooperative. No acute distress. HEAD: Normocephalic. Atraumatic. EYES: EOM's grossly intact. Sclera anicteric. ENT: Mucous membranes are moist. Tolerates saliva. No trismus. NECK: Supple. No meningismus. Trachea midline. HEART: RRR. Radial pulses 2+. LUNGS: Respirations unlabored. CTAB  ABDOMEN: Soft. No guarding or rebound. Elevated BMI.  abdominal exam difficult secondary body habitus. EXTREMITIES: No acute deformities. SKIN: Warm and dry. NEUROLOGICAL: No gross facial drooping. Moves all 4 extremities spontaneously. PSYCHIATRIC: Normal mood.     I have reviewed and interpreted all of the currently available lab results from this visit (if applicable):  Results for orders placed or performed during the hospital encounter of 10/13/19   CBC with Auto Diff   Result Value Ref Range    WBC 5.5 4.0 - 10.5 K/CU MM    RBC 4.72 4.2 - 5.4 M/CU MM    Hemoglobin 11.1 (L) 12.5 - 16.0 GM/DL    Hematocrit 37.3 37 - 47 %    MCV 79.0 78 - 100 FL    MCH 23.5 (L) 27 - 31 PG    MCHC 29.8 (L) 32.0 - 36.0 %    RDW 17.3 (H) 11.7 - 14.9 %    Platelets 139 961 - 094 K/CU MM    MPV 10.3

## 2019-10-13 NOTE — ED NOTES
Report received from [facility]. Mena Muñoz Dears, [address] [place] [address]  10/13/19 1409

## 2019-10-13 NOTE — H&P
HISTORY AND PHYSICAL    10/13/2019     Patient Information:    Patient: Lianna Peres     Gender: female  : 1970   Age: 46 y.o. MRN: [id number]        PCP:  Jeanine Whitman MD (Tel: [phone] )    Chief complaint:    Chief Complaint   Patient presents with    Abdominal Pain    Emesis        History of Present Illness:   Andre Pope is a 46 y.o. female with h/o Upper GI bleed few years ago , s/p gastric sleeve on 2019 with normal EGD on 2019 by DR Neda Payton for dropping H/H and another EGD done by Dr Jessy Holt 2019 revealed mild gastritis .Salvador Bravo pt got admitted for vomiting blood and EGD revealed mild gastritis with anemia , also pt had bacteremia and left foot infection with  Chronic osteomylitis of left first metatarsal / abscess and she finished  taking ABX Ceftin almost one week ago and following with ID consultant. also pt has seizure disorder on Meds now   Pt  presented to ER for nausea and vomiting ,on and off in nature but it got more frequent and agressive in last two days and she had red fresh blood with vomit and was witnessed by ER staff . In ER abdomen CT done with No acute abnormality identified. also hemoglobin is stable and better than last admission . History obtained from patient . REVIEW OF SYSTEMS:   Constitutional: Negative for fever,chills or night sweats  ENT: Negative for rhinorrhea, epistaxis, hoarseness, sore throat. Respiratory: Negative for shortness of breath,wheezing  Cardiovascular: Negative for chest pain, palpitations   Gastrointestinal: + Abdominal pain ,  nausea, vomiting and hematemesis no diarrhea  Genitourinary: Negative for polyuria, dysuria   Hematologic/Lymphatic: Negative for bleeding tendency, easy bruising  Musculoskeletal: Negative for myalgias and arthralgias  Neurologic: Negative for confusion,dysarthria.   Skin: Negative for itching,rash  Psychiatric: Negative for Outpatient Medications on File Prior to Encounter   Medication Sig Dispense Refill    busPIRone (BUSPAR) 10 MG tablet Take 1 tablet by mouth 3 times daily 90 tablet 1    levETIRAcetam (KEPPRA) 1000 MG tablet Take 1 tablet by mouth 2 times daily 60 tablet 3    lacosamide (VIMPAT) 50 MG TABS tablet Take 1 tablet by mouth 2 times daily for 14 days. 28 tablet 0    oxyCODONE-acetaminophen (PERCOCET) 5-325 MG per tablet Take 1 tablet by mouth every 8 hours as needed for Pain.        promethazine (PHENERGAN) 25 MG tablet Take 0.5 tablets by mouth every 6 hours as needed for Nausea 35 tablet 3    cefUROXime (CEFTIN) 500 MG tablet Take 1 tablet by mouth 2 times daily Start on 7/29/19 112 tablet 0    Nutritional Supplements (ENSURE HIGH PROTEIN) LIQD Take 1 Can by mouth Daily with lunch 32 Can 3    ondansetron (ZOFRAN ODT) 4 MG disintegrating tablet Take 1 tablet by mouth every 8 hours as needed for Nausea or Vomiting 30 tablet 2    pantoprazole (PROTONIX) 20 MG tablet Take 2 tablets by mouth 2 times daily 60 tablet 5    Cholecalciferol (VITAMIN D3) 5000 units TABS Take 1 tablet by mouth daily      ferrous sulfate (FE TABS) 325 (65 Fe) MG EC tablet Take 1 tablet by mouth 2 times daily 60 tablet 5    tiZANidine (ZANAFLEX) 4 MG tablet Take 4 mg by mouth 2 times daily      estradiol (ESTRACE) 0.5 MG tablet Take 0.5 mg by mouth daily      dicyclomine (BENTYL) 20 MG tablet Take 20 mg by mouth every 6 hours      atenolol (TENORMIN) 25 MG tablet Take 25 mg by mouth daily      Multiple Vitamin (MVI, BARIATRIC ADVANTAGE MULTI-FORMULA, CHEW TAB) Take 1 tablet by mouth daily 30 tablet 5    Calcium Citrate-Vitamin D (CALCIUM + VIT D, BARIATRIC ADVANTAGE, CHEWABLE TABLET) Take 1 tablet by mouth 2 times daily 120 tablet 5    levothyroxine (SYNTHROID) 125 MCG tablet Take 1 tablet by mouth Daily (Patient taking differently: Take 125 mcg by mouth nightly ) 30 tablet 0    gabapentin (NEURONTIN) 800 MG tablet Take 800 mg by mouth 3 times daily      PARoxetine (PAXIL) 30 MG tablet Take 40 mg by mouth nightly          Allergies: Allergies   Allergen Reactions    Aspirin Palpitations     \"My Heart Rate Elevates\"    Compazine [Prochlorperazine Maleate] Rash    Reglan [Metoclopramide Hcl] Rash    Shellfish-Derived Products Swelling    Toradol [Ketorolac Tromethamine] Rash        Social History:   reports that she has never smoked. She has never used smokeless tobacco. She reports that she does not drink alcohol or use drugs. Family History:  family history includes Arthritis in her mother; Asthma in her father and son; Cancer in her father; Diabetes in her father and mother; Heart Disease in her mother; High Blood Pressure in her brother, father, and mother; High Cholesterol in her mother; Lupus in her daughter; Obesity in her mother; Other in her daughter; Vision Loss in her mother and son. ,     Physical Exam:  /67   Pulse 58   Temp 98 °F (36.7 °C) (Oral)   Resp 16   Ht 5' 4\" (1.626 m)   Wt 258 lb (117 kg)   SpO2 95%   BMI 44.29 kg/m²     General appearance: Moderate distress . Well nourished  Eyes: Sclera clear, pupils equal  ENT: Moist mucus membranes, no thrush. Trachea midline. Cardiovascular: Regular rhythm, normal S1, S2. No murmur, gallop, rub. No edema in lower extremities  Respiratory: Clear to auscultation bilaterally, no wheeze, good inspiratory effort  Gastrointestinal: Abdomen soft, RUQ and epigastric tenderness, not distended, normal bowel sounds  Musculoskeletal: No cyanosis in digits, neck supple  Neurology: Cranial nerves grossly intact. Alert and oriented in time, place and person. No speech or motor deficits  Psychiatry: Appropriate affect.  Not agitated  Skin: Warm, dry, normal turgor, no rash    Labs:  CBC:   Lab Results   Component Value Date    WBC 5.5 10/13/2019    RBC 4.72 10/13/2019    HGB 11.1 10/13/2019    HCT 37.3 10/13/2019    MCV 79.0 10/13/2019    MCH 23.5 10/13/2019    MCHC 29.8 10/13/2019    RDW 17.3 10/13/2019     10/13/2019    MPV 10.3 10/13/2019     BMP:    Lab Results   Component Value Date     10/13/2019    K 4.0 10/13/2019     10/13/2019    CO2 30 10/13/2019    BUN 21 10/13/2019    CREATININE 0.9 10/13/2019    CALCIUM 11.0 10/13/2019    GFRAA >60 10/13/2019    LABGLOM >60 10/13/2019    GLUCOSE 90 10/13/2019       Chest Xray:   EKG:    I visualized CXR images and EKG strips      Patient Active Problem List   Diagnosis Code    Rectal bleeding K62.5    Fever R50.9    Upper GI bleed K92.2    Fibromyalgia M79.7    Lupus (systemic lupus erythematosus) ([facility]) M32.9    Nausea & vomiting R11.2    Chest pain R07.9    Chronic pancreatitis ([facility]) K86.1    HTN (hypertension) I10    Acute pancreatitis K85.90    Right-sided chest pain R07.9    Super obesity E66.9    Absolute anemia D64.9    Hypokalemia E87.6    Generalized abdominal pain R10.84    Pneumonia of both lungs due to infectious organism J18.9    Foot ulcer, left ([facility]) L97.529    SLE (systemic lupus erythematosus related syndrome) ([facility]) M32.9    Hypoxia R09.02    Cellulitis L03.90    Pancytopenia ([facility]) D61.818    Pleurisy R09.1    Frequent UTI N39.0    Gastroesophageal reflux disease without esophagitis K21.9    MRSA cellulitis of left foot L03.116, B95.62    Depression F32.9    Fatty liver K76.0    Fatty liver disease, nonalcoholic D83.2    Arthritis M19.90    Bilateral low back pain with left-sided sciatica M54.42    Morbid obesity with BMI of 50.0-59.9, adult ([facility]) E66.01, Z68.43    Intractable vomiting with nausea R11.2    Class 3 obesity in adult [id number]    Hiatal hernia K44.9    Poor intravenous access Z78.9    Post-operative nausea and vomiting R11.2, Z98.890    Status post bariatric surgery Z98.84    Status post laparoscopic sleeve gastrectomy Z98.84    Abscess L02.91    Dysphagia R13.10    Pseudoseizure F44.5    Chronic pain syndrome G89.4    Drug-seeking/Aberrant behavior Z76.5    Acute conjunctivitis H10.30    Chronic osteomyelitis of left foot with draining sinus ([facility]) M86.472    Receiving intravenous antibiotic treatment as outpatient Z79.2    History of bacteremia Z87.898    Abdominal pain, right upper quadrant R10.11    Sepsis due to Pseudomonas species ([facility]) A41.52    Bacteremia due to Pseudomonas R78.81    Catheter-related bloodstream infection T80.211A    Hematemesis with nausea K92.0    Hematemesis K92.0           mild gastritis   Anemia   H/o GI bleed   Recent h/o Bacteremia with foot infection   S/p gastric sleeve            Assessment/Plan:   Admit on Tele  NPO except meds   IVF   protonix IV drip then PO BID   Monitor H/H   surgical consult   Home meds   Pain control   DVT proph: SCDs               Chico Caballero MD    10/13/2019 12:11 PM

## 2019-10-13 NOTE — CONSULTS
SURGICAL CONSULTATION    Date of Admission:  10/13/2019  Date of Consultation:  10/13/2019    PCP:  Marni Fofana MD  Thank you Dr. Shirley Gonzalez MD very much for your consultation, it's always appreciated. I had the privilege today to see your patient Margi Che. Chief Complaint / History of Present Illness:  Margi Che is a very pleasant 46 y.o. female who presents with pain in the Epigastrium and right Upper quadrant and is chronic, worsening and dull compared to patient's normal condition. It is moderate in intensity for a duration of 2 months and is intermittent with modifying factors increased by or worse with eating. .  She c/o hematemesis yesterday of ~ 50 ml of bright red blood. .  Hb stable, will follow, and scope if need be. PMH:   has a past medical history of Acid reflux, Anemia, Anxiety, Arthritis, Bleeding ulcer (), Bronchitis (Last Episode ), Chronic back pain, Chronic pain, COPD (chronic obstructive pulmonary disease) ([address] Depression, Fibromyalgia (Dx ), H/O echocardiogram (8/11/15), H/O echocardiogram (2018), Hiatal hernia, History of blood transfusion (2015 And ), [place] (hard of hearing), cardiac catheterization (10/24/2010), transesophageal echocardiography (PILO) for monitoring (2010), OTHER MEDICAL, OTHER MEDICAL, Hypertension, Lupus ([address] (Dx ), Morbid obesity ([address] Nausea, Pain management, Pancreatitis chronic (Dx ), Pneumonia (Dx 11-15), Shortness of breath on exertion, Shortness of breath on exertion, Sleep apnea, Staph infection (Dx -15), Staph infection (Dx 11-15), Teeth missing, Thyroid disease, UTI (urinary tract infection) (In Past), and Wears glasses. PSH:   has a past surgical history that includes Lung removal, partial (Left, );  section (1991); Foot surgery (Left, Last Done In ); Dental surgery;  Cholecystectomy, laparoscopic (9789'F); other surgical history (1998); other surgical MD Kirti 100 mL/hr at 10/13/19 1533      pantoprazole (PROTONIX) 80 mg in sodium chloride 0.9 % 100 mL infusion  8 mg/hr Intravenous Continuous Darline Cotto MD        morphine sulfate (PF) injection 2 mg  2 mg Intravenous Q4H PRN Darline Cotto MD   2 mg at 10/13/19 1536    [START ON 10/15/2019] pantoprazole (PROTONIX) tablet 40 mg  40 mg Oral BID AC Sara Marmolejo MD          sodium chloride 100 mL/hr at 10/13/19 0750    dextrose 5% and 0.45% NaCl with KCl 20 mEq 100 mL/hr at 10/13/19 1533    pantoprozole (PROTONIX) infusion         Social History / Family History:     Social History     Socioeconomic History    Marital status:      Spouse name: None    Number of children: None    Years of education: None    Highest education level: None   Occupational History    None   Social Needs    Financial resource strain: None    Food insecurity:     Worry: None     Inability: None    Transportation needs:     Medical: None     Non-medical: None   Tobacco Use    Smoking status: Never Smoker    Smokeless tobacco: Never Used   Substance and Sexual Activity    Alcohol use: No     Alcohol/week: 0.0 standard drinks    Drug use: No    Sexual activity: Not Currently   Lifestyle    Physical activity:     Days per week: None     Minutes per session: None    Stress: None   Relationships    Social connections:     Talks on phone: None     Gets together: None     Attends Caodaism service: None     Active member of club or organization: None     Attends meetings of clubs or organizations: None     Relationship status: None    Intimate partner violence:     Fear of current or ex partner: None     Emotionally abused: None     Physically abused: None     Forced sexual activity: None   Other Topics Concern    None   Social History Narrative    None      Family History   Problem Relation Age of Onset    Diabetes Mother         \"Borderline Diabetes\"    High Blood Pressure Mother     Obesity Mother     Arthritis Mother     Heart Disease Mother     High Cholesterol Mother     Vision Loss Mother     Diabetes Father     High Blood Pressure Father     Asthma Father     Cancer Father         prostate cancer    High Blood Pressure Brother     Asthma Son     Vision Loss Son     Lupus Daughter     Other Daughter         \"Alot Of Female Problems\"    otherwise irrelevant to this surgical issue. Review of Systems:  Constitutional: Negative for fever, chills, diaphoresis, appetite change and fatigue. HENT: Negative for sore throat, trouble swallowing and voice change. Respiratory: Negative for cough, positive for shortness of breath no wheezing. Cardiovascular: Negative for chest pain positive for SOB with one flight of stairs exertion, no pitting LE edema. Gastrointestinal: Positive for post prandial abdominal pain, in the epigastrium and RUQ. Johanna Rodriguez Positive for nausea, and occasional vomiting, no abdominal distention, constipation, no diarrhea, blood in stool, anal bleeding or rectal pain. Musculoskeletal: Negative for joint swelling and arthralgias. Skin: Warm and dry, well perfused. Neurological: Negative for seizures, syncope, speech difficulty and weakness. Hematological/Lymphatic: Negative for adenopathy. No history of DVT/PE. Does not bruise/bleed easily. Psychiatric/Behavioral: Negative for agitation. All others reviewed and negative. Physical Exam:  Vital Signs:   Vitals:    10/13/19 1539   BP: (!) 110/54   Pulse: 57   Resp: 15   Temp: 98.2 °F (36.8 °C)   SpO2: 96%     Body mass index is 43.91 kg/m². General appearance: Pt Alert and oriented, in no apparent acute distress. HEENT:  KATE, Conjunctiva clear. EOMI, No JVDs, Bruits, Megalies: neither thyroid nor lymph nodes. Lungs: Clear to auscultation bilaterally. Heart: Regular rate and rhythm, S1, S2 normal, no murmur, rub or gallop. Abdomen: Epigastrium and RUQ tender. Non distended. Positive bowel sounds.  No 0.5 0 - 1 %    Segs Absolute 2.5 K/CU MM    Lymphocytes Absolute 2.3 K/CU MM    Monocytes Absolute 0.3 K/CU MM    Eosinophils Absolute 0.4 K/CU MM    Basophils Absolute 0.0 K/CU MM    Nucleated RBC % 0.0 %    Total Nucleated RBC 0.0 K/CU MM    Total Immature Neutrophil 0.00 K/CU MM    Immature Neutrophil % 0.0 0 - 0.43 %   CMP    Collection Time: 10/13/19  8:10 AM   Result Value Ref Range    Sodium 140 135 - 145 MMOL/L    Potassium 4.0 3.5 - 5.1 MMOL/L    Chloride 103 99 - 110 mMol/L    CO2 30 21 - 32 MMOL/L    BUN 21 6 - 23 MG/DL    CREATININE 0.9 0.6 - 1.1 MG/DL    Glucose 90 70 - 99 MG/DL    Calcium 11.0 (H) 8.3 - 10.6 MG/DL    Alb 4.4 3.4 - 5.0 GM/DL    Total Protein 7.3 6.4 - 8.2 GM/DL    Total Bilirubin 0.2 0.0 - 1.0 MG/DL    ALT 18 10 - 40 U/L    AST 20 15 - 37 IU/L    Alkaline Phosphatase 137 (H) 40 - 129 IU/L    GFR Non-African American >60 >60 mL/min/1.73m2    GFR African American >60 >60 mL/min/1.73m2    Anion Gap 7 4 - 16   Lipase    Collection Time: 10/13/19  8:10 AM   Result Value Ref Range    Lipase 15 13 - 60 IU/L   TYPE AND SCREEN    Collection Time: 10/13/19  8:10 AM   Result Value Ref Range    ABO/Rh A POSITIVE     Antibody Screen NEGATIVE     Unit Number [id number]     Component LEUKO-POOR RED CELLS     Unit Divison 00     Status ALLOCATED     Transfusion Status OK TO TRANSFUSE     Crossmatch Result COMPATIBLE     Unit Number [id number]     Component LEUKO-POOR RED CELLS     Unit Divison 00     Status ALLOCATED     Transfusion Status OK TO TRANSFUSE     Crossmatch Result COMPATIBLE    Hemoglobin and hematocrit, blood    Collection Time: 10/13/19  3:21 PM   Result Value Ref Range    Hemoglobin 10.8 (L) 12.5 - 16.0 GM/DL    Hematocrit 37.1 37 - 47 %       Diagnosis:  Patient Active Problem List   Diagnosis    Rectal bleeding    Fever    Upper GI bleed    Fibromyalgia    Lupus (systemic lupus erythematosus) (HCC)    Nausea & vomiting    Chest pain    Chronic pancreatitis (HCC)    HTN (hypertension)    Acute pancreatitis    Right-sided chest pain    Super obesity    Absolute anemia    Hypokalemia    Generalized abdominal pain    Pneumonia of both lungs due to infectious organism    Foot ulcer, left (HCC)    SLE (systemic lupus erythematosus related syndrome) (HCC)    Hypoxia    Cellulitis    Pancytopenia (HCC)    Pleurisy    Frequent UTI    Gastroesophageal reflux disease without esophagitis    MRSA cellulitis of left foot    Depression    Fatty liver    Fatty liver disease, nonalcoholic    Arthritis    Bilateral low back pain with left-sided sciatica    Morbid obesity with BMI of 50.0-59.9, adult (HCC)    Intractable vomiting with nausea    Class 3 obesity in adult    Hiatal hernia    Poor intravenous access    Post-operative nausea and vomiting    Status post bariatric surgery    Status post laparoscopic sleeve gastrectomy    Abscess    Dysphagia    Pseudoseizure    Chronic pain syndrome    Drug-seeking/Aberrant behavior    Acute conjunctivitis    Chronic osteomyelitis of left foot with draining sinus (HCC)    Receiving intravenous antibiotic treatment as outpatient    History of bacteremia    Abdominal pain, right upper quadrant    Sepsis due to Pseudomonas species ([facility]    Bacteremia due to Pseudomonas    Catheter-related bloodstream infection    Hematemesis with nausea    Hematemesis       Assessment & plan:  Margarito Santos is a very pleasant 46 y.o. female presenting with hematemesis yesterday of ~ 50 ml of bright red blood. . H/o RYGB. Hb stable, will follow, and scope if need be. .  Continue NPO/Bowel rest, IV Fluids, Pain control, Nausea control.     Thank you doctor Virginie Oquendo MD very much for your consultation and for the opportunity to take care of Mrs Margarito Santos with you, I'll follow along with you and I'll update you on any new events in her care.    ____________________________________________    Nirali Schulz MD, FACS, FICS    10/13/2019  4:42 PM      Patient was seen with total face to face time of 45 minutes. More than 50% of this visit was counseling and education as above in my assessment and plan section of my note.

## 2019-10-13 NOTE — ED NOTES
Went to get patient for CT. Patient call light on, stated that she was throwing up blood and was waiting for someone to come in. RN notified. Will check back shortly.

## 2019-10-13 NOTE — ED NOTES
Pt ambulated to bathroom with gait steady. Pt had just vomited bright red bloody emesis. Pt c/o severe pain to right side of abd and is requesting pain medication. This RN will notify MD for orders.  Pt gown and bed linens changed and warm blankets provided     Blane Abel RN  10/13/19 9783

## 2019-10-13 NOTE — ED NOTES
Report called to [address] Pt will be transported to room 4101 via cart by transport tech when room is cleaned and ready for patient. Pt updated on plan of care. Pt requesting pain medication. This RN informed pt there is no med orders at this time and she is NPO.      Yumiko Garcia RN  10/13/19 6273

## 2019-10-13 NOTE — CARE COORDINATION
CM review of pt chart for readmission risk, last admission 9/29-10/3/2019 for c/o Hematemesis. Pt returns to ER today with came complaint of vomiting blood. Dr More Rivero ER provider, no alternatives to admission due to active s/s of hematemesis and pain, consult to Dr Lorie Wilson, pt admitted Observation for further evaluation. Review of pt notes:Pt with multiple medical conditions including but not limited to chronic osteomyelitis, lupus and COPD. Pt has been staying with her mother for approx 3 years. Mother's home is 2 story but they only use the 1st floor. Pt's bed/bath/laundry on main floor. Pt's mother drives. Pt is able to drive when well enough. Pt has 2 children; 1 dgt in [place] and 1 son in [place], [place]. Pt would like to get well enough to return home to [place] with her son. Pt's mother uses a walker and pt has her own walker if needed. Pt has PCP and [employer] to assist with cost of Rxs. Pt has had [facility] in the past.  CM to follow for needs at discharge.  VANESSA RN,CM

## 2019-10-14 ENCOUNTER — ANESTHESIA EVENT (OUTPATIENT)
Dept: ENDOSCOPY | Age: 49
DRG: 378 | End: 2019-10-14
Payer: COMMERCIAL

## 2019-10-14 ENCOUNTER — ANESTHESIA (OUTPATIENT)
Dept: ENDOSCOPY | Age: 49
DRG: 378 | End: 2019-10-14
Payer: COMMERCIAL

## 2019-10-14 VITALS — SYSTOLIC BLOOD PRESSURE: 90 MMHG | DIASTOLIC BLOOD PRESSURE: 43 MMHG | OXYGEN SATURATION: 100 %

## 2019-10-14 LAB
HCT VFR BLD CALC: 31.9 % (ref 37–47)
HCT VFR BLD CALC: 32.2 % (ref 37–47)
HCT VFR BLD CALC: 40.4 % (ref 37–47)
HEMOGLOBIN: 10.9 GM/DL (ref 12.5–16)
HEMOGLOBIN: 9.2 GM/DL (ref 12.5–16)
HEMOGLOBIN: 9.5 GM/DL (ref 12.5–16)
MCH RBC QN AUTO: 23.2 PG (ref 27–31)
MCHC RBC AUTO-ENTMCNC: 28.6 % (ref 32–36)
MCV RBC AUTO: 81.1 FL (ref 78–100)
PDW BLD-RTO: 17.2 % (ref 11.7–14.9)
PLATELET # BLD: 191 K/CU MM (ref 140–440)
PMV BLD AUTO: 10.2 FL (ref 7.5–11.1)
RBC # BLD: 3.97 M/CU MM (ref 4.2–5.4)
WBC # BLD: 4.2 K/CU MM (ref 4–10.5)

## 2019-10-14 PROCEDURE — 2580000003 HC RX 258: Performed by: FAMILY MEDICINE

## 2019-10-14 PROCEDURE — 43239 EGD BIOPSY SINGLE/MULTIPLE: CPT | Performed by: SURGERY

## 2019-10-14 PROCEDURE — 6370000000 HC RX 637 (ALT 250 FOR IP): Performed by: FAMILY MEDICINE

## 2019-10-14 PROCEDURE — 3700000001 HC ADD 15 MINUTES (ANESTHESIA): Performed by: SURGERY

## 2019-10-14 PROCEDURE — 3700000000 HC ANESTHESIA ATTENDED CARE: Performed by: SURGERY

## 2019-10-14 PROCEDURE — 88305 TISSUE EXAM BY PATHOLOGIST: CPT

## 2019-10-14 PROCEDURE — 2580000003 HC RX 258: Performed by: EMERGENCY MEDICINE

## 2019-10-14 PROCEDURE — G0378 HOSPITAL OBSERVATION PER HR: HCPCS

## 2019-10-14 PROCEDURE — 2580000003 HC RX 258

## 2019-10-14 PROCEDURE — 94761 N-INVAS EAR/PLS OXIMETRY MLT: CPT

## 2019-10-14 PROCEDURE — 85018 HEMOGLOBIN: CPT

## 2019-10-14 PROCEDURE — 96366 THER/PROPH/DIAG IV INF ADDON: CPT

## 2019-10-14 PROCEDURE — C9113 INJ PANTOPRAZOLE SODIUM, VIA: HCPCS | Performed by: FAMILY MEDICINE

## 2019-10-14 PROCEDURE — 96376 TX/PRO/DX INJ SAME DRUG ADON: CPT

## 2019-10-14 PROCEDURE — 88342 IMHCHEM/IMCYTCHM 1ST ANTB: CPT

## 2019-10-14 PROCEDURE — 6360000002 HC RX W HCPCS: Performed by: NURSE ANESTHETIST, CERTIFIED REGISTERED

## 2019-10-14 PROCEDURE — 85027 COMPLETE CBC AUTOMATED: CPT

## 2019-10-14 PROCEDURE — 2500000003 HC RX 250 WO HCPCS: Performed by: NURSE ANESTHETIST, CERTIFIED REGISTERED

## 2019-10-14 PROCEDURE — 99232 SBSQ HOSP IP/OBS MODERATE 35: CPT | Performed by: SURGERY

## 2019-10-14 PROCEDURE — 87077 CULTURE AEROBIC IDENTIFY: CPT

## 2019-10-14 PROCEDURE — 6360000002 HC RX W HCPCS: Performed by: FAMILY MEDICINE

## 2019-10-14 PROCEDURE — 85014 HEMATOCRIT: CPT

## 2019-10-14 PROCEDURE — 36415 COLL VENOUS BLD VENIPUNCTURE: CPT

## 2019-10-14 PROCEDURE — 0DB68ZX EXCISION OF STOMACH, VIA NATURAL OR ARTIFICIAL OPENING ENDOSCOPIC, DIAGNOSTIC: ICD-10-PCS | Performed by: FAMILY MEDICINE

## 2019-10-14 PROCEDURE — 2709999900 HC NON-CHARGEABLE SUPPLY: Performed by: SURGERY

## 2019-10-14 PROCEDURE — 3609017100 HC EGD: Performed by: SURGERY

## 2019-10-14 PROCEDURE — 96361 HYDRATE IV INFUSION ADD-ON: CPT

## 2019-10-14 RX ORDER — SODIUM CHLORIDE, SODIUM LACTATE, POTASSIUM CHLORIDE, CALCIUM CHLORIDE 600; 310; 30; 20 MG/100ML; MG/100ML; MG/100ML; MG/100ML
INJECTION, SOLUTION INTRAVENOUS ONCE
Status: COMPLETED | OUTPATIENT
Start: 2019-10-14 | End: 2019-10-14

## 2019-10-14 RX ORDER — PROPOFOL 10 MG/ML
INJECTION, EMULSION INTRAVENOUS PRN
Status: DISCONTINUED | OUTPATIENT
Start: 2019-10-14 | End: 2019-10-14 | Stop reason: SDUPTHER

## 2019-10-14 RX ORDER — LIDOCAINE HYDROCHLORIDE 20 MG/ML
INJECTION, SOLUTION INFILTRATION; PERINEURAL PRN
Status: DISCONTINUED | OUTPATIENT
Start: 2019-10-14 | End: 2019-10-14 | Stop reason: SDUPTHER

## 2019-10-14 RX ORDER — SODIUM CHLORIDE, SODIUM LACTATE, POTASSIUM CHLORIDE, CALCIUM CHLORIDE 600; 310; 30; 20 MG/100ML; MG/100ML; MG/100ML; MG/100ML
INJECTION, SOLUTION INTRAVENOUS
Status: COMPLETED
Start: 2019-10-14 | End: 2019-10-14

## 2019-10-14 RX ADMIN — DICYCLOMINE HYDROCHLORIDE 20 MG: 20 TABLET ORAL at 21:40

## 2019-10-14 RX ADMIN — LEVOTHYROXINE SODIUM 125 MCG: 125 TABLET ORAL at 05:44

## 2019-10-14 RX ADMIN — TIZANIDINE 4 MG: 4 TABLET ORAL at 09:19

## 2019-10-14 RX ADMIN — PROPOFOL 30 MG: 10 INJECTION, EMULSION INTRAVENOUS at 15:16

## 2019-10-14 RX ADMIN — ONDANSETRON 8 MG: 4 TABLET, ORALLY DISINTEGRATING ORAL at 21:40

## 2019-10-14 RX ADMIN — CEFUROXIME AXETIL 500 MG: 250 TABLET ORAL at 23:04

## 2019-10-14 RX ADMIN — SODIUM CHLORIDE, SODIUM LACTATE, POTASSIUM CHLORIDE, CALCIUM CHLORIDE: 600; 310; 30; 20 INJECTION, SOLUTION INTRAVENOUS at 14:53

## 2019-10-14 RX ADMIN — TIZANIDINE 4 MG: 4 TABLET ORAL at 21:40

## 2019-10-14 RX ADMIN — MORPHINE SULFATE 2 MG: 4 INJECTION, SOLUTION INTRAMUSCULAR; INTRAVENOUS at 03:48

## 2019-10-14 RX ADMIN — ONDANSETRON 8 MG: 4 TABLET, ORALLY DISINTEGRATING ORAL at 13:33

## 2019-10-14 RX ADMIN — SODIUM CHLORIDE 8 MG/HR: 9 INJECTION, SOLUTION INTRAVENOUS at 03:01

## 2019-10-14 RX ADMIN — PROPOFOL 20 MG: 10 INJECTION, EMULSION INTRAVENOUS at 15:18

## 2019-10-14 RX ADMIN — PROMETHAZINE HYDROCHLORIDE 12.5 MG: 12.5 TABLET ORAL at 18:35

## 2019-10-14 RX ADMIN — MORPHINE SULFATE 2 MG: 4 INJECTION, SOLUTION INTRAMUSCULAR; INTRAVENOUS at 16:21

## 2019-10-14 RX ADMIN — PROPOFOL 10 MG: 10 INJECTION, EMULSION INTRAVENOUS at 15:24

## 2019-10-14 RX ADMIN — GABAPENTIN 800 MG: 400 CAPSULE ORAL at 16:21

## 2019-10-14 RX ADMIN — PROMETHAZINE HYDROCHLORIDE 12.5 MG: 12.5 TABLET ORAL at 09:13

## 2019-10-14 RX ADMIN — LACOSAMIDE 50 MG: 50 TABLET, FILM COATED ORAL at 23:04

## 2019-10-14 RX ADMIN — PAROXETINE HYDROCHLORIDE 40 MG: 20 TABLET, FILM COATED ORAL at 21:40

## 2019-10-14 RX ADMIN — SODIUM CHLORIDE: 9 INJECTION, SOLUTION INTRAVENOUS at 23:07

## 2019-10-14 RX ADMIN — LIDOCAINE HYDROCHLORIDE 100 MG: 20 INJECTION, SOLUTION INFILTRATION; PERINEURAL at 15:13

## 2019-10-14 RX ADMIN — BUSPIRONE HYDROCHLORIDE 10 MG: 10 TABLET ORAL at 23:05

## 2019-10-14 RX ADMIN — GABAPENTIN 800 MG: 400 CAPSULE ORAL at 05:44

## 2019-10-14 RX ADMIN — ONDANSETRON 8 MG: 4 TABLET, ORALLY DISINTEGRATING ORAL at 03:50

## 2019-10-14 RX ADMIN — SODIUM CHLORIDE: 9 INJECTION, SOLUTION INTRAVENOUS at 12:25

## 2019-10-14 RX ADMIN — PROPOFOL 70 MG: 10 INJECTION, EMULSION INTRAVENOUS at 15:13

## 2019-10-14 RX ADMIN — LEVETIRACETAM 1000 MG: 500 TABLET, FILM COATED ORAL at 23:05

## 2019-10-14 RX ADMIN — SODIUM CHLORIDE, POTASSIUM CHLORIDE, SODIUM LACTATE AND CALCIUM CHLORIDE: 600; 310; 30; 20 INJECTION, SOLUTION INTRAVENOUS at 14:53

## 2019-10-14 RX ADMIN — PROPOFOL 20 MG: 10 INJECTION, EMULSION INTRAVENOUS at 15:22

## 2019-10-14 RX ADMIN — GABAPENTIN 800 MG: 400 CAPSULE ORAL at 21:40

## 2019-10-14 RX ADMIN — SODIUM CHLORIDE: 9 INJECTION, SOLUTION INTRAVENOUS at 03:01

## 2019-10-14 RX ADMIN — MORPHINE SULFATE 2 MG: 4 INJECTION, SOLUTION INTRAMUSCULAR; INTRAVENOUS at 09:13

## 2019-10-14 RX ADMIN — ACETAMINOPHEN 650 MG: 325 TABLET ORAL at 23:05

## 2019-10-14 RX ADMIN — MORPHINE SULFATE 2 MG: 4 INJECTION, SOLUTION INTRAMUSCULAR; INTRAVENOUS at 21:40

## 2019-10-14 ASSESSMENT — PAIN DESCRIPTION - LOCATION
LOCATION: ABDOMEN
LOCATION: ABDOMEN;HEAD

## 2019-10-14 ASSESSMENT — PAIN SCALES - GENERAL
PAINLEVEL_OUTOF10: 7
PAINLEVEL_OUTOF10: 8
PAINLEVEL_OUTOF10: 7
PAINLEVEL_OUTOF10: 6
PAINLEVEL_OUTOF10: 7
PAINLEVEL_OUTOF10: 6

## 2019-10-14 ASSESSMENT — PAIN DESCRIPTION - DESCRIPTORS
DESCRIPTORS: ACHING;CONSTANT;SHARP
DESCRIPTORS: ACHING;CONSTANT;SHARP

## 2019-10-14 ASSESSMENT — PAIN DESCRIPTION - PAIN TYPE
TYPE: ACUTE PAIN
TYPE: ACUTE PAIN

## 2019-10-14 ASSESSMENT — PAIN DESCRIPTION - ONSET
ONSET: ON-GOING
ONSET: ON-GOING

## 2019-10-14 ASSESSMENT — PAIN DESCRIPTION - FREQUENCY: FREQUENCY: CONTINUOUS

## 2019-10-14 ASSESSMENT — PAIN DESCRIPTION - ORIENTATION
ORIENTATION: RIGHT;MID
ORIENTATION: RIGHT

## 2019-10-14 ASSESSMENT — LIFESTYLE VARIABLES: SMOKING_STATUS: 0

## 2019-10-14 ASSESSMENT — ENCOUNTER SYMPTOMS: SHORTNESS OF BREATH: 1

## 2019-10-14 NOTE — PROGRESS NOTES
GENERAL SURGERY PROGRESS NOTE    CC/HPI:           Patient feels the same, with apparently further bleeding this morning. Vitals:    10/13/19 1539 10/13/19 2102 10/14/19 0530 10/14/19 0903   BP: (!) 110/54 131/72 (!) 117/59 (!) 177/85   Pulse: 57 56 60 54   Resp: 15 16 16 16   Temp: 98.2 °F (36.8 °C) 98.1 °F (36.7 °C) 98 °F (36.7 °C) 97.5 °F (36.4 °C)   TempSrc: Oral   Oral   SpO2: 96% 94% 94% 98%   Weight: 255 lb 12.8 oz (116 kg)      Height: 5' 4\" (1.626 m)        I/O last 3 completed shifts:  In: -   Out: 50 [Emesis/NG output:50]  No intake/output data recorded.     Diet NPO Effective Now Exceptions are: Sips with Meds    Recent Results (from the past 48 hour(s))   Urinalysis with microscopic    Collection Time: 10/13/19  6:44 AM   Result Value Ref Range    Color, UA YELLOW YELLOW    Clarity, UA HAZY (A) CLEAR    Glucose, Urine NEGATIVE NEGATIVE MG/DL    Bilirubin Urine SMALL (A) NEGATIVE MG/DL    Ketones, Urine NEGATIVE NEGATIVE MG/DL    Specific Gravity, UA 1.019 1.001 - 1.035    Blood, Urine NEGATIVE NEGATIVE    pH, Urine 5.0 5.0 - 8.0    Protein, UA NEGATIVE NEGATIVE MG/DL    Urobilinogen, Urine NORMAL 0.2 - 1.0 MG/DL    Nitrite Urine, Quantitative NEGATIVE NEGATIVE    Leukocyte Esterase, Urine TRACE (A) NEGATIVE    RBC, UA 1 0 - 6 /HPF    WBC, UA 3 0 - 5 /HPF    Bacteria, UA NEGATIVE NEGATIVE /HPF    Squam Epithel, UA 4 /HPF    Mucus, UA RARE (A) NEGATIVE HPF    Trichomonas, UA NONE SEEN NONE SEEN /HPF    Hyaline Casts, UA 2 /LPF   ICTOTEST, URINE    Collection Time: 10/13/19  6:44 AM   Result Value Ref Range    Ictotest NEGATIVE    CBC with Auto Diff    Collection Time: 10/13/19  8:10 AM   Result Value Ref Range    WBC 5.5 4.0 - 10.5 K/CU MM    RBC 4.72 4.2 - 5.4 M/CU MM    Hemoglobin 11.1 (L) 12.5 - 16.0 GM/DL    Hematocrit 37.3 37 - 47 %    MCV 79.0 78 - 100 FL    MCH 23.5 (L) 27 - 31 PG    MCHC 29.8 (L) 32.0 - 36.0 %    RDW 17.3 (H) 11.7 - 14.9 %    Platelets 307 771 - 488 K/CU MM    MPV 10.3 7.5 - 11.1 FL    Differential Type AUTOMATED DIFFERENTIAL     Segs Relative 44.7 36 - 66 %    Lymphocytes % 42.4 24 - 44 %    Monocytes % 6.0 (H) 0 - 4 %    Eosinophils % 6.4 (H) 0 - 3 %    Basophils % 0.5 0 - 1 %    Segs Absolute 2.5 K/CU MM    Lymphocytes Absolute 2.3 K/CU MM    Monocytes Absolute 0.3 K/CU MM    Eosinophils Absolute 0.4 K/CU MM    Basophils Absolute 0.0 K/CU MM    Nucleated RBC % 0.0 %    Total Nucleated RBC 0.0 K/CU MM    Total Immature Neutrophil 0.00 K/CU MM    Immature Neutrophil % 0.0 0 - 0.43 %   CMP    Collection Time: 10/13/19  8:10 AM   Result Value Ref Range    Sodium 140 135 - 145 MMOL/L    Potassium 4.0 3.5 - 5.1 MMOL/L    Chloride 103 99 - 110 mMol/L    CO2 30 21 - 32 MMOL/L    BUN 21 6 - 23 MG/DL    CREATININE 0.9 0.6 - 1.1 MG/DL    Glucose 90 70 - 99 MG/DL    Calcium 11.0 (H) 8.3 - 10.6 MG/DL    Alb 4.4 3.4 - 5.0 GM/DL    Total Protein 7.3 6.4 - 8.2 GM/DL    Total Bilirubin 0.2 0.0 - 1.0 MG/DL    ALT 18 10 - 40 U/L    AST 20 15 - 37 IU/L    Alkaline Phosphatase 137 (H) 40 - 129 IU/L    GFR Non-African American >60 >60 mL/min/1.73m2    GFR African American >60 >60 mL/min/1.73m2    Anion Gap 7 4 - 16   Lipase    Collection Time: 10/13/19  8:10 AM   Result Value Ref Range    Lipase 15 13 - 60 IU/L   TYPE AND SCREEN    Collection Time: 10/13/19  8:10 AM   Result Value Ref Range    ABO/Rh A POSITIVE     Antibody Screen NEGATIVE     Unit Number [id number]     Component LEUKO-POOR RED CELLS     Unit Divison 00     Status ALLOCATED     Transfusion Status OK TO TRANSFUSE     Crossmatch Result COMPATIBLE     Unit Number [id number]     Component LEUKO-POOR RED CELLS     Unit Divison 00     Status ALLOCATED     Transfusion Status OK TO TRANSFUSE     Crossmatch Result COMPATIBLE    Hemoglobin and hematocrit, blood    Collection Time: 10/13/19  3:21 PM   Result Value Ref Range    Hemoglobin 10.8 (L) 12.5 - 16.0 GM/DL    Hematocrit 37.1 37 - 47 %   Hemoglobin and hematocrit, blood Collection Time: 10/14/19 12:00 AM   Result Value Ref Range    Hemoglobin 9.5 (L) 12.5 - 16.0 GM/DL    Hematocrit 31.9 (L) 37 - 47 %   CBC    Collection Time: 10/14/19  5:12 AM   Result Value Ref Range    WBC 4.2 4.0 - 10.5 K/CU MM    RBC 3.97 (L) 4.2 - 5.4 M/CU MM    Hemoglobin 9.2 (L) 12.5 - 16.0 GM/DL    Hematocrit 32.2 (L) 37 - 47 %    MCV 81.1 78 - 100 FL    MCH 23.2 (L) 27 - 31 PG    MCHC 28.6 (L) 32.0 - 36.0 %    RDW 17.2 (H) 11.7 - 14.9 %    Platelets 525 301 - 646 K/CU MM    MPV 10.2 7.5 - 11.1 FL       Scheduled Meds:   atenolol  25 mg Oral Daily    PARoxetine  40 mg Oral Nightly    gabapentin  800 mg Oral TID    levothyroxine  125 mcg Oral Daily    therapeutic multivitamin-minerals  1 tablet Oral Daily    estradiol  0.5 mg Oral Daily    dicyclomine  20 mg Oral Q6H    tiZANidine  4 mg Oral BID    ferrous sulfate  325 mg Oral BID WC    cefUROXime  500 mg Oral BID    busPIRone  10 mg Oral TID    levETIRAcetam  1,000 mg Oral BID    lacosamide  50 mg Oral BID    sodium chloride flush  10 mL Intravenous 2 times per day    acetaminophen  650 mg Oral 3 times per day    calcium-vitamin D  1 tablet Oral BID WC       Continuous Infusions:   sodium chloride 100 mL/hr at 10/14/19 1225    dextrose 5% and 0.45% NaCl with KCl 20 mEq 100 mL/hr at 10/13/19 1533       Physical Exam:  HEENT: Anicteric sclerae, Oropharyngeal mucosae moist, pink and intact. Heart:  Normal S1 and S2, RRR  Lungs: Clear to auscultation bilaterally, No audible Wheezes or Rales. Extremities: No edema. Neuro: Alert and Oriented x 3, Non focal.  Abdomen: Soft, Benign, Non tender, Non distended, Positive bowel sounds. Active Problems:    Hematemesis  Resolved Problems:    * No resolved hospital problems. *      Assessment and Plan:  Claudette Hodge is a 46 y.o. female with a h/o RYGB, now with ? UPPER GI BLEED? Apparently had further bleeding this morning. .  Will proceed with an EGJ, and treat

## 2019-10-14 NOTE — CARE COORDINATION
Pt well known to this CM from previous admissions. Pt lives at home with her mother, pt remains ind with ADLs, has pcp/ active insurance. CM available if needs arise.

## 2019-10-14 NOTE — PROGRESS NOTES
lCaudette Hodge is a 46 y.o. female patient.     Current Facility-Administered Medications   Medication Dose Route Frequency Provider Last Rate Last Dose    0.9 % sodium chloride infusion   Intravenous Continuous Jose Hampton  mL/hr at 10/14/19 0301      atenolol (TENORMIN) tablet 25 mg  25 mg Oral Daily Darline Cotto MD        PARoxetine (PAXIL) tablet 40 mg  40 mg Oral Nightly Josiah Tapia MD        gabapentin (NEURONTIN) capsule 800 mg  800 mg Oral TID Josiah Tapia MD   800 mg at 10/13/19 2344    levothyroxine (SYNTHROID) tablet 125 mcg  125 mcg Oral Daily Josiah Tapia MD        therapeutic multivitamin-minerals 1 tablet  1 tablet Oral Daily Josiah Tapia MD        estradiol (ESTRACE) tablet 0.5 mg  0.5 mg Oral Daily Josiah Tapia MD        dicyclomine (BENTYL) tablet 20 mg  20 mg Oral Q6H Darline Cotto MD        tiZANidine (ZANAFLEX) tablet 4 mg  4 mg Oral BID Josiah Tapia MD   4 mg at 10/13/19 2343    ferrous sulfate tablet 325 mg  325 mg Oral BID WC Josiah Tapia MD        cefUROXime (CEFTIN) tablet 500 mg  500 mg Oral BID Josiah Tapia MD        oxyCODONE-acetaminophen (PERCOCET) 5-325 MG per tablet 1 tablet  1 tablet Oral Q8H PRN Darline Cotto MD        busPIRone (BUSPAR) tablet 10 mg  10 mg Oral TID Darline Cotto MD        levETIRAcetam (KEPPRA) tablet 1,000 mg  1,000 mg Oral BID Josiah Tapia MD        lacosamide (VIMPAT) tablet 50 mg  50 mg Oral BID Josiah Tapia MD        sodium chloride flush 0.9 % injection 10 mL  10 mL Intravenous 2 times per day Josiah Tapia MD        sodium chloride flush 0.9 % injection 10 mL  10 mL Intravenous PRN Josiah Tapia MD        magnesium hydroxide (MILK OF MAGNESIA) 400 MG/5ML suspension 30 mL  30 mL Oral Daily PRN Josiah Tapia MD        acetaminophen (TYLENOL) tablet 650 mg  650 mg Oral 3 times per day Josiah Tapia MD        dextrose 5 % and 0.45 % NaCl with KCl

## 2019-10-15 LAB
CLOTEST: NEGATIVE
HCT VFR BLD CALC: 31.6 % (ref 37–47)
HEMOGLOBIN: 9.3 GM/DL (ref 12.5–16)

## 2019-10-15 PROCEDURE — 2580000003 HC RX 258: Performed by: EMERGENCY MEDICINE

## 2019-10-15 PROCEDURE — 36415 COLL VENOUS BLD VENIPUNCTURE: CPT

## 2019-10-15 PROCEDURE — 85018 HEMOGLOBIN: CPT

## 2019-10-15 PROCEDURE — 1200000000 HC SEMI PRIVATE

## 2019-10-15 PROCEDURE — 96376 TX/PRO/DX INJ SAME DRUG ADON: CPT

## 2019-10-15 PROCEDURE — 6370000000 HC RX 637 (ALT 250 FOR IP): Performed by: FAMILY MEDICINE

## 2019-10-15 PROCEDURE — 6360000002 HC RX W HCPCS: Performed by: FAMILY MEDICINE

## 2019-10-15 PROCEDURE — 99232 SBSQ HOSP IP/OBS MODERATE 35: CPT | Performed by: SURGERY

## 2019-10-15 PROCEDURE — 85014 HEMATOCRIT: CPT

## 2019-10-15 PROCEDURE — 96361 HYDRATE IV INFUSION ADD-ON: CPT

## 2019-10-15 RX ADMIN — BUSPIRONE HYDROCHLORIDE 10 MG: 10 TABLET ORAL at 14:17

## 2019-10-15 RX ADMIN — SODIUM CHLORIDE: 9 INJECTION, SOLUTION INTRAVENOUS at 19:50

## 2019-10-15 RX ADMIN — ESTRADIOL 0.5 MG: 1 TABLET ORAL at 09:29

## 2019-10-15 RX ADMIN — MORPHINE SULFATE 2 MG: 4 INJECTION, SOLUTION INTRAMUSCULAR; INTRAVENOUS at 19:50

## 2019-10-15 RX ADMIN — DICYCLOMINE HYDROCHLORIDE 20 MG: 20 TABLET ORAL at 22:49

## 2019-10-15 RX ADMIN — BUSPIRONE HYDROCHLORIDE 10 MG: 10 TABLET ORAL at 22:49

## 2019-10-15 RX ADMIN — PROMETHAZINE HYDROCHLORIDE 12.5 MG: 12.5 TABLET ORAL at 11:07

## 2019-10-15 RX ADMIN — LACOSAMIDE 50 MG: 50 TABLET, FILM COATED ORAL at 22:48

## 2019-10-15 RX ADMIN — PROMETHAZINE HYDROCHLORIDE 12.5 MG: 12.5 TABLET ORAL at 19:50

## 2019-10-15 RX ADMIN — LEVETIRACETAM 1000 MG: 500 TABLET, FILM COATED ORAL at 09:29

## 2019-10-15 RX ADMIN — GABAPENTIN 800 MG: 400 CAPSULE ORAL at 14:17

## 2019-10-15 RX ADMIN — LACOSAMIDE 50 MG: 50 TABLET, FILM COATED ORAL at 09:29

## 2019-10-15 RX ADMIN — PAROXETINE HYDROCHLORIDE 40 MG: 20 TABLET, FILM COATED ORAL at 22:49

## 2019-10-15 RX ADMIN — LEVOTHYROXINE SODIUM 125 MCG: 125 TABLET ORAL at 05:50

## 2019-10-15 RX ADMIN — LEVETIRACETAM 1000 MG: 500 TABLET, FILM COATED ORAL at 22:49

## 2019-10-15 RX ADMIN — GABAPENTIN 800 MG: 400 CAPSULE ORAL at 05:50

## 2019-10-15 RX ADMIN — PROMETHAZINE HYDROCHLORIDE 12.5 MG: 12.5 TABLET ORAL at 02:54

## 2019-10-15 RX ADMIN — DICYCLOMINE HYDROCHLORIDE 20 MG: 20 TABLET ORAL at 09:29

## 2019-10-15 RX ADMIN — DICYCLOMINE HYDROCHLORIDE 20 MG: 20 TABLET ORAL at 14:17

## 2019-10-15 RX ADMIN — TIZANIDINE 4 MG: 4 TABLET ORAL at 09:30

## 2019-10-15 RX ADMIN — MORPHINE SULFATE 2 MG: 4 INJECTION, SOLUTION INTRAMUSCULAR; INTRAVENOUS at 11:07

## 2019-10-15 RX ADMIN — GABAPENTIN 800 MG: 400 CAPSULE ORAL at 22:48

## 2019-10-15 RX ADMIN — MORPHINE SULFATE 2 MG: 4 INJECTION, SOLUTION INTRAMUSCULAR; INTRAVENOUS at 15:12

## 2019-10-15 RX ADMIN — ONDANSETRON 8 MG: 4 TABLET, ORALLY DISINTEGRATING ORAL at 23:47

## 2019-10-15 RX ADMIN — BUSPIRONE HYDROCHLORIDE 10 MG: 10 TABLET ORAL at 05:50

## 2019-10-15 RX ADMIN — SODIUM CHLORIDE: 9 INJECTION, SOLUTION INTRAVENOUS at 09:08

## 2019-10-15 RX ADMIN — ONDANSETRON 8 MG: 4 TABLET, ORALLY DISINTEGRATING ORAL at 15:12

## 2019-10-15 RX ADMIN — MORPHINE SULFATE 2 MG: 4 INJECTION, SOLUTION INTRAMUSCULAR; INTRAVENOUS at 06:55

## 2019-10-15 RX ADMIN — MORPHINE SULFATE 2 MG: 4 INJECTION, SOLUTION INTRAMUSCULAR; INTRAVENOUS at 02:54

## 2019-10-15 RX ADMIN — TIZANIDINE 4 MG: 4 TABLET ORAL at 22:48

## 2019-10-15 RX ADMIN — ONDANSETRON 8 MG: 4 TABLET, ORALLY DISINTEGRATING ORAL at 06:55

## 2019-10-15 RX ADMIN — ATENOLOL 25 MG: 25 TABLET ORAL at 09:29

## 2019-10-15 RX ADMIN — MORPHINE SULFATE 2 MG: 4 INJECTION, SOLUTION INTRAMUSCULAR; INTRAVENOUS at 23:50

## 2019-10-15 ASSESSMENT — PAIN SCALES - GENERAL
PAINLEVEL_OUTOF10: 8
PAINLEVEL_OUTOF10: 7
PAINLEVEL_OUTOF10: 8
PAINLEVEL_OUTOF10: 5
PAINLEVEL_OUTOF10: 7
PAINLEVEL_OUTOF10: 7
PAINLEVEL_OUTOF10: 8
PAINLEVEL_OUTOF10: 7

## 2019-10-15 ASSESSMENT — PAIN DESCRIPTION - PROGRESSION
CLINICAL_PROGRESSION: GRADUALLY WORSENING

## 2019-10-15 ASSESSMENT — PAIN DESCRIPTION - ORIENTATION
ORIENTATION: RIGHT
ORIENTATION: RIGHT

## 2019-10-15 ASSESSMENT — PAIN DESCRIPTION - DESCRIPTORS
DESCRIPTORS: ACHING;CONSTANT
DESCRIPTORS: ACHING;CONSTANT

## 2019-10-15 ASSESSMENT — PAIN DESCRIPTION - ONSET: ONSET: ON-GOING

## 2019-10-15 ASSESSMENT — PAIN DESCRIPTION - PAIN TYPE
TYPE: ACUTE PAIN
TYPE: ACUTE PAIN

## 2019-10-15 ASSESSMENT — PAIN DESCRIPTION - LOCATION
LOCATION: ABDOMEN
LOCATION: ABDOMEN

## 2019-10-15 NOTE — PROGRESS NOTES
Skin: Skin color, texture, turgor normal.  No rashes or lesions. Neurologic:  Alert and oriented , no focal sensory/motor deficits , grossly non-focal.  Psychiatric: Alert and oriented, thought content appropriate, normal insight      Labs:   Recent Labs     10/13/19  0810  10/14/19  0000 10/14/19  0512 10/14/19  1732   WBC 5.5  --   --  4.2  --    HGB 11.1*   < > 9.5* 9.2* 10.9*   HCT 37.3   < > 31.9* 32.2* 40.4     --   --  191  --     < > = values in this interval not displayed. Recent Labs     10/13/19  0810      K 4.0      CO2 30   BUN 21   CREATININE 0.9   CALCIUM 11.0*     Recent Labs     10/13/19  0810   AST 20   ALT 18   BILITOT 0.2   ALKPHOS 137*     No results for input(s): INR in the last 72 hours. No results for input(s): Mary Ales in the last 72 hours. Assessment/Plan:    Active Hospital Problems    Diagnosis Date Noted    Hematemesis [K92.0] 09/29/2019       D/c tomorrow if stable , and ok with surg   EGD done   NPO except meds   IVF   protonix PO BID   Monitor H/H .. Stable   surgical input noted   Home meds   Pain control   DVT proph: SCDs   Diet: DIET CARB CONTROL;  Code Status: Full Code    Treatment progress and plan was d/w pt/family .         Nader Mcmillan MD

## 2019-10-15 NOTE — PLAN OF CARE
Problem: Falls - Risk of:  Goal: Will remain free from falls  Description  Will remain free from falls  10/15/2019 0004 by Moe Phan RN  Outcome: Ongoing  10/14/2019 1231 by Lawson De Luna RN  Outcome: Ongoing  Goal: Absence of physical injury  Description  Absence of physical injury  10/15/2019 0004 by Moe Phan RN  Outcome: Ongoing  10/14/2019 1231 by Lawson De Luna RN  Outcome: Ongoing     Problem: Pain:  Goal: Pain level will decrease  Description  Pain level will decrease  10/15/2019 0004 by Moe Phan RN  Outcome: Ongoing  10/14/2019 1231 by Lawson De Luna RN  Outcome: Ongoing  Goal: Control of acute pain  Description  Control of acute pain  10/15/2019 0004 by Moe Phan RN  Outcome: Ongoing  10/14/2019 1231 by Lawson De Luna RN  Outcome: Ongoing  Goal: Control of chronic pain  Description  Control of chronic pain  10/15/2019 0004 by Moe Phan RN  Outcome: Ongoing  10/14/2019 1231 by Lawson De Luna RN  Outcome: Ongoing

## 2019-10-15 NOTE — PLAN OF CARE
Problem: Falls - Risk of:  Goal: Will remain free from falls  Description  Will remain free from falls  10/15/2019 0944 by Leland Thomas RN  Outcome: Ongoing  10/15/2019 0004 by Danielle Huang RN  Outcome: Ongoing  Goal: Absence of physical injury  Description  Absence of physical injury  10/15/2019 0944 by Leland Thomas RN  Outcome: Ongoing  10/15/2019 0004 by Danielle Huang RN  Outcome: Ongoing     Problem: Pain:  Goal: Pain level will decrease  Description  Pain level will decrease  10/15/2019 0944 by Leland Thomas RN  Outcome: Ongoing  10/15/2019 0004 by Danielle Huang RN  Outcome: Ongoing  Goal: Control of acute pain  Description  Control of acute pain  10/15/2019 0944 by Leland Thomas RN  Outcome: Ongoing  10/15/2019 0004 by Danielle Huang RN  Outcome: Ongoing  Goal: Control of chronic pain  Description  Control of chronic pain  10/15/2019 0944 by Leland Thomas RN  Outcome: Ongoing  10/15/2019 0004 by Danielle Huang RN  Outcome: Ongoing

## 2019-10-16 VITALS
TEMPERATURE: 97.8 F | RESPIRATION RATE: 17 BRPM | WEIGHT: 255.8 LBS | HEART RATE: 53 BPM | HEIGHT: 64 IN | SYSTOLIC BLOOD PRESSURE: 99 MMHG | DIASTOLIC BLOOD PRESSURE: 57 MMHG | OXYGEN SATURATION: 94 % | BODY MASS INDEX: 43.67 KG/M2

## 2019-10-16 PROCEDURE — 94761 N-INVAS EAR/PLS OXIMETRY MLT: CPT

## 2019-10-16 PROCEDURE — 85014 HEMATOCRIT: CPT

## 2019-10-16 PROCEDURE — 85027 COMPLETE CBC AUTOMATED: CPT

## 2019-10-16 PROCEDURE — 85018 HEMOGLOBIN: CPT

## 2019-10-16 PROCEDURE — 6360000002 HC RX W HCPCS: Performed by: FAMILY MEDICINE

## 2019-10-16 PROCEDURE — 2580000003 HC RX 258: Performed by: EMERGENCY MEDICINE

## 2019-10-16 PROCEDURE — 6370000000 HC RX 637 (ALT 250 FOR IP): Performed by: FAMILY MEDICINE

## 2019-10-16 RX ORDER — HEPARIN SODIUM (PORCINE) LOCK FLUSH IV SOLN 100 UNIT/ML 100 UNIT/ML
500 SOLUTION INTRAVENOUS PRN
Status: DISCONTINUED | OUTPATIENT
Start: 2019-10-16 | End: 2019-10-16 | Stop reason: HOSPADM

## 2019-10-16 RX ADMIN — LEVETIRACETAM 1000 MG: 500 TABLET, FILM COATED ORAL at 08:39

## 2019-10-16 RX ADMIN — BUSPIRONE HYDROCHLORIDE 10 MG: 10 TABLET ORAL at 13:56

## 2019-10-16 RX ADMIN — Medication 500 UNITS: at 13:02

## 2019-10-16 RX ADMIN — BUSPIRONE HYDROCHLORIDE 10 MG: 10 TABLET ORAL at 06:12

## 2019-10-16 RX ADMIN — ONDANSETRON 8 MG: 4 TABLET, ORALLY DISINTEGRATING ORAL at 08:39

## 2019-10-16 RX ADMIN — ATENOLOL 25 MG: 25 TABLET ORAL at 08:39

## 2019-10-16 RX ADMIN — CEFUROXIME AXETIL 500 MG: 250 TABLET ORAL at 08:39

## 2019-10-16 RX ADMIN — PROMETHAZINE HYDROCHLORIDE 12.5 MG: 12.5 TABLET ORAL at 04:21

## 2019-10-16 RX ADMIN — ACETAMINOPHEN 650 MG: 325 TABLET ORAL at 13:56

## 2019-10-16 RX ADMIN — MORPHINE SULFATE 2 MG: 4 INJECTION, SOLUTION INTRAMUSCULAR; INTRAVENOUS at 08:39

## 2019-10-16 RX ADMIN — LACOSAMIDE 50 MG: 50 TABLET, FILM COATED ORAL at 08:39

## 2019-10-16 RX ADMIN — ESTRADIOL 0.5 MG: 1 TABLET ORAL at 08:39

## 2019-10-16 RX ADMIN — MULTIPLE VITAMINS W/ MINERALS TAB 1 TABLET: TAB at 08:39

## 2019-10-16 RX ADMIN — GABAPENTIN 800 MG: 400 CAPSULE ORAL at 13:56

## 2019-10-16 RX ADMIN — MORPHINE SULFATE 2 MG: 4 INJECTION, SOLUTION INTRAMUSCULAR; INTRAVENOUS at 13:02

## 2019-10-16 RX ADMIN — OYSTER SHELL CALCIUM WITH VITAMIN D 1 TABLET: 500; 200 TABLET, FILM COATED ORAL at 08:39

## 2019-10-16 RX ADMIN — GABAPENTIN 800 MG: 400 CAPSULE ORAL at 06:12

## 2019-10-16 RX ADMIN — SODIUM CHLORIDE: 9 INJECTION, SOLUTION INTRAVENOUS at 06:12

## 2019-10-16 RX ADMIN — TIZANIDINE 4 MG: 4 TABLET ORAL at 08:39

## 2019-10-16 RX ADMIN — MORPHINE SULFATE 2 MG: 4 INJECTION, SOLUTION INTRAMUSCULAR; INTRAVENOUS at 04:21

## 2019-10-16 RX ADMIN — LEVOTHYROXINE SODIUM 125 MCG: 125 TABLET ORAL at 06:12

## 2019-10-16 RX ADMIN — DICYCLOMINE HYDROCHLORIDE 20 MG: 20 TABLET ORAL at 08:39

## 2019-10-16 RX ADMIN — ONDANSETRON 8 MG: 4 TABLET, ORALLY DISINTEGRATING ORAL at 13:02

## 2019-10-16 RX ADMIN — FERROUS SULFATE TAB 325 MG (65 MG ELEMENTAL FE) 325 MG: 325 (65 FE) TAB at 08:39

## 2019-10-16 RX ADMIN — DICYCLOMINE HYDROCHLORIDE 20 MG: 20 TABLET ORAL at 13:56

## 2019-10-16 ASSESSMENT — PAIN DESCRIPTION - PROGRESSION
CLINICAL_PROGRESSION: NOT CHANGED
CLINICAL_PROGRESSION: NOT CHANGED
CLINICAL_PROGRESSION: GRADUALLY WORSENING
CLINICAL_PROGRESSION: NOT CHANGED
CLINICAL_PROGRESSION: GRADUALLY WORSENING
CLINICAL_PROGRESSION: GRADUALLY WORSENING
CLINICAL_PROGRESSION: NOT CHANGED
CLINICAL_PROGRESSION: GRADUALLY WORSENING
CLINICAL_PROGRESSION: NOT CHANGED

## 2019-10-16 ASSESSMENT — PAIN SCALES - GENERAL
PAINLEVEL_OUTOF10: 7
PAINLEVEL_OUTOF10: 6

## 2019-10-16 ASSESSMENT — PAIN DESCRIPTION - ONSET: ONSET: ON-GOING

## 2019-10-16 ASSESSMENT — PAIN DESCRIPTION - PAIN TYPE: TYPE: ACUTE PAIN

## 2019-10-16 ASSESSMENT — PAIN DESCRIPTION - FREQUENCY: FREQUENCY: CONTINUOUS

## 2019-10-16 ASSESSMENT — PAIN DESCRIPTION - LOCATION: LOCATION: ABDOMEN

## 2019-10-16 ASSESSMENT — PAIN DESCRIPTION - ORIENTATION: ORIENTATION: RIGHT

## 2019-10-16 ASSESSMENT — PAIN DESCRIPTION - DESCRIPTORS: DESCRIPTORS: ACHING

## 2019-10-16 ASSESSMENT — PAIN - FUNCTIONAL ASSESSMENT: PAIN_FUNCTIONAL_ASSESSMENT: ACTIVITIES ARE NOT PREVENTED

## 2019-10-16 NOTE — PROGRESS NOTES
Bev Alberts is a 46 y.o. female patient.     Current Facility-Administered Medications   Medication Dose Route Frequency Provider Last Rate Last Dose    0.9 % sodium chloride infusion   Intravenous Continuous Pedro Luis Ngyuen  mL/hr at 10/15/19 1950      atenolol (TENORMIN) tablet 25 mg  25 mg Oral Daily Darline Cotto MD   25 mg at 10/15/19 0929    PARoxetine (PAXIL) tablet 40 mg  40 mg Oral Nightly Darline Cotto MD   40 mg at 10/15/19 2249    gabapentin (NEURONTIN) capsule 800 mg  800 mg Oral TID Chris Starr MD   800 mg at 10/15/19 2248    levothyroxine (SYNTHROID) tablet 125 mcg  125 mcg Oral Daily Darline Cotto MD   125 mcg at 10/15/19 0550    therapeutic multivitamin-minerals 1 tablet  1 tablet Oral Daily Chris Starr MD   Stopped at 10/14/19 0924    estradiol (ESTRACE) tablet 0.5 mg  0.5 mg Oral Daily Darline Cotto MD   0.5 mg at 10/15/19 0929    dicyclomine (BENTYL) tablet 20 mg  20 mg Oral Q6H Darline Cotto MD   20 mg at 10/15/19 2249    tiZANidine (ZANAFLEX) tablet 4 mg  4 mg Oral BID Darline Cotto MD   4 mg at 10/15/19 2248    ferrous sulfate tablet 325 mg  325 mg Oral BID WC Chris Starr MD   Stopped at 10/14/19 0923    cefUROXime (CEFTIN) tablet 500 mg  500 mg Oral BID Chris Starr MD   Stopped at 10/15/19 0931    oxyCODONE-acetaminophen (PERCOCET) 5-325 MG per tablet 1 tablet  1 tablet Oral Q8H PRN Darline Cotto MD        busPIRone (BUSPAR) tablet 10 mg  10 mg Oral TID Chrsi Starr MD   10 mg at 10/15/19 2249    levETIRAcetam (KEPPRA) tablet 1,000 mg  1,000 mg Oral BID Chris Starr MD   1,000 mg at 10/15/19 2249    lacosamide (VIMPAT) tablet 50 mg  50 mg Oral BID Chris Starr MD   50 mg at 10/15/19 2248    sodium chloride flush 0.9 % injection 10 mL  10 mL Intravenous 2 times per day Chris Starr MD        sodium chloride flush 0.9 % injection 10 mL  10 mL Intravenous PRN MD Pranay Yuan

## 2019-10-16 NOTE — DISCHARGE INSTR - COC
Nurse Assessment:  Last Vital Signs: /61   Pulse 66   Temp 98 °F (36.7 °C)   Resp 14   Ht 5' 4\" (1.626 m)   Wt 255 lb 12.8 oz (116 kg)   SpO2 97%   BMI 43.91 kg/m²     Last documented pain score (0-10 scale): Pain Level: 7  Last Weight:   Wt Readings from Last 1 Encounters:   10/13/19 255 lb 12.8 oz (116 kg)     Mental Status:  {IP PT MENTAL STATUS:81838}    IV Access:  { MALCOLM IV ACCESS:861786903}    Nursing Mobility/ADLs:  Walking   {CHP DME DJBK:892141831}  Transfer  {CHP DME AEFQ:646664828}  Bathing  {CHP DME CQAQ:941404857}  Dressing  {CHP DME MQJK:529519700}  Toileting  {CHP DME OINL:544572132}  Feeding  {CHP DME GBOJ:012593212}  Med Admin  {P DME ULII:924757068}  Med Delivery   { MALCOLM MED Delivery:981119775}    Wound Care Documentation and Therapy:  Wound 06/20/19 Vagina Left red/swollen bump to labia  (Active)   Number of days: 118        Elimination:  Continence:   · Bowel: {YES / HY:96937}  · Bladder: {YES / TC:82737}  Urinary Catheter: {Urinary Catheter:830128340}   Colostomy/Ileostomy/Ileal Conduit: {YES / HL:10117}       Date of Last BM: ***    Intake/Output Summary (Last 24 hours) at 10/16/2019 1114  Last data filed at 10/16/2019 9127  Gross per 24 hour   Intake 440 ml   Output --   Net 440 ml     I/O last 3 completed shifts:   In: 65 [P.O.:680]  Out: -     Safety Concerns:     [address] Safety Concerns:276963110}    Impairments/Disabilities:      [address] Impairments/Disabilities:294676756}    Nutrition Therapy:  Current Nutrition Therapy:   [address] Diet List:954392707}    Routes of Feeding: {CHP DME Other Feedings:147915749}  Liquids: {Slp liquid thickness:43172}  Daily Fluid Restriction: {CHP DME Yes amt example:478278685}  Last Modified Barium Swallow with Video (Video Swallowing Test): {Done Not Done VIER:841295316}    Treatments at the Time of Hospital Discharge:   Respiratory Treatments: ***  Oxygen Therapy:  {Therapy; copd oxygen:61780}  Ventilator:    { CC Vent WTTU:840646520}    Rehab Therapies: {THERAPEUTIC INTERVENTION:5895242826}  Weight Bearing Status/Restrictions: { CC Weight Bearin}  Other Medical Equipment (for information only, NOT a DME order):  {EQUIPMENT:126484050}  Other Treatments: ***    Patient's personal belongings (please select all that are sent with patient):  {CHP DME Belongings:674326349}    RN SIGNATURE:  {Esignature:137744898}    CASE MANAGEMENT/SOCIAL WORK SECTION    Inpatient Status Date: ***    Readmission Risk Assessment Score:  Readmission Risk              Risk of Unplanned Readmission:        40           Discharging to Facility/ Agency   · Name:   · Address:  · Phone:  · Fax:    Dialysis Facility (if applicable)   · Name:  · Address:  · Dialysis Schedule:  · Phone:  · Fax:    / signature: {Esignature:754285411}    PHYSICIAN SECTION    Prognosis: {Prognosis:5508965022}    Condition at Discharge: [address] [place] [address] Patient Condition:076019226}    Rehab Potential (if transferring to Rehab): {Prognosis:3690041872}    Recommended Labs or Other Treatments After Discharge: ***    Physician Certification: I certify the above information and transfer of Vivian Hickman  is necessary for the continuing treatment of the diagnosis listed and that she requires {Admit to Appropriate Level of Care:26848} for {GREATER/LESS:701786790} 30 days.      Update Admission H&P: {CHP DME Changes in PFACW:859895132}    PHYSICIAN SIGNATURE:  {Esignature:948368801}

## 2019-10-16 NOTE — PROGRESS NOTES
Pt said mother will be picking her up between 2 and 3 p.m. Today. Does not want taxi services to assist earlier than that time. Pt requested to make own f/u appointment. Educated on importance. Volunteer services called to take patient down to mother. All questions answered.    April Leiva, RAKELN, RN

## 2019-10-16 NOTE — DISCHARGE SUMMARY
Current Discharge Medication List      CONTINUE these medications which have NOT CHANGED    Details   busPIRone (BUSPAR) 10 MG tablet Take 1 tablet by mouth 3 times daily  Qty: 90 tablet, Refills: 1      levETIRAcetam (KEPPRA) 1000 MG tablet Take 1 tablet by mouth 2 times daily  Qty: 60 tablet, Refills: 3      lacosamide (VIMPAT) 50 MG TABS tablet Take 1 tablet by mouth 2 times daily for 14 days. Qty: 28 tablet, Refills: 0    Associated Diagnoses: Tonic clonic convulsion (HCC)      oxyCODONE-acetaminophen (PERCOCET) 5-325 MG per tablet Take 1 tablet by mouth every 8 hours as needed for Pain.        promethazine (PHENERGAN) 25 MG tablet Take 0.5 tablets by mouth every 6 hours as needed for Nausea  Qty: 35 tablet, Refills: 3      Nutritional Supplements (ENSURE HIGH PROTEIN) LIQD Take 1 Can by mouth Daily with lunch  Qty: 32 Can, Refills: 3      ondansetron (ZOFRAN ODT) 4 MG disintegrating tablet Take 1 tablet by mouth every 8 hours as needed for Nausea or Vomiting  Qty: 30 tablet, Refills: 2      pantoprazole (PROTONIX) 20 MG tablet Take 2 tablets by mouth 2 times daily  Qty: 60 tablet, Refills: 5      Cholecalciferol (VITAMIN D3) 5000 units TABS Take 1 tablet by mouth daily      ferrous sulfate (FE TABS) 325 (65 Fe) MG EC tablet Take 1 tablet by mouth 2 times daily  Qty: 60 tablet, Refills: 5      tiZANidine (ZANAFLEX) 4 MG tablet Take 4 mg by mouth 2 times daily      estradiol (ESTRACE) 0.5 MG tablet Take 0.5 mg by mouth daily      dicyclomine (BENTYL) 20 MG tablet Take 20 mg by mouth every 6 hours      atenolol (TENORMIN) 25 MG tablet Take 25 mg by mouth daily      Multiple Vitamin (MVI, BARIATRIC ADVANTAGE MULTI-FORMULA, CHEW TAB) Take 1 tablet by mouth daily  Qty: 30 tablet, Refills: 5      Calcium Citrate-Vitamin D (CALCIUM + VIT D, BARIATRIC ADVANTAGE, CHEWABLE TABLET) Take 1 tablet by mouth 2 times daily  Qty: 120 tablet, Refills: 5      levothyroxine (SYNTHROID) 125 MCG tablet Take 1 tablet by mouth Daily  Qty: 30 tablet, Refills: 0      gabapentin (NEURONTIN) 800 MG tablet Take 800 mg by mouth 3 times daily      PARoxetine (PAXIL) 30 MG tablet Take 40 mg by mouth nightly            Current Discharge Medication List      STOP taking these medications       cefUROXime (CEFTIN) 500 MG tablet Comments:   Reason for Stopping:               Discharge ROS:  A complete review of systems was asked and negative except for abd discomfort . Discharge Exam:    /61   Pulse 66   Temp 98 °F (36.7 °C)   Resp 14   Ht 5' 4\" (1.626 m)   Wt 255 lb 12.8 oz (116 kg)   SpO2 97%   BMI 43.91 kg/m²   General appearance:  NAD  Heart[de-identified] Normal s1/s2, RRR, no murmurs, gallops, or rubs. trace leg edema  Lungs:  Clear to auscultation, bilaterally without Rales/Wheezes/Rhonchi. Abdomen: Soft, non-tender, non-distended, bowel sounds present  Musculoskeletal:   no cyanosis, trace edema  Neurologic:  Cranial nerves: II-XII intact, grossly non-focal.  Psychiatric:  A & O x3      Labs:  For convenience and continuity at follow-up the following most recent labs are provided:    Lab Results   Component Value Date    WBC 4.2 10/14/2019    HGB 9.3 10/15/2019    HCT 31.6 10/15/2019    MCV 81.1 10/14/2019     10/14/2019     10/13/2019    K 4.0 10/13/2019     10/13/2019    CO2 30 10/13/2019    BUN 21 10/13/2019    CREATININE 0.9 10/13/2019    CALCIUM 11.0 10/13/2019    PHOS 4.4 12/04/2015    BNP 88 11/26/2010    ALKPHOS 137 10/13/2019    ALT 18 10/13/2019    AST 20 10/13/2019    BILITOT 0.2 10/13/2019    BILIDIR 0.2 01/02/2016    LABALBU 4.4 10/13/2019    LABALBU 8 11/18/2015    TRIG 161 07/23/2015     Lab Results   Component Value Date    INR 0.93 10/01/2019    INR 0.97 08/23/2019    INR 0.97 07/23/2019           Chart review shows recent radiographs:  Ct Head Wo Contrast    Result Date: 10/1/2019  EXAMINATION: CT OF THE HEAD WITHOUT CONTRAST  10/1/2019 3:08 pm TECHNIQUE: CT of the head was performed without the administration of intravenous contrast. Dose modulation, iterative reconstruction, and/or weight based adjustment of the mA/kV was utilized to reduce the radiation dose to as low as reasonably achievable. COMPARISON: 06/15/2019. HISTORY: ORDERING SYSTEM PROVIDED HISTORY: EPILEPSY TECHNOLOGIST PROVIDED HISTORY: Has a \"code stroke\" or \"stroke alert\" been called? ->No Is the patient pregnant?->No Reason for Exam: epilepsy, multiple seizures Acuity: Acute Type of Exam: Initial Additional signs and symptoms: unknown Relevant Medical/Surgical History: poor historian FINDINGS: BRAIN/VENTRICLES: The cerebral and cerebellar parenchyma demonstrate normal volume. There are no areas of acute hemorrhage, mass, or midline shift. No abnormal extra-axial fluid collections. The ventricles are normal in size. Gray-white differentiation is maintained. ORBITS: The visualized portion of the orbits demonstrate no acute abnormality. SINUSES: The visualized paranasal sinuses and mastoid air cells demonstrate no acute abnormality. SOFT TISSUES/SKULL:  No acute abnormality of the visualized skull or soft tissues. No acute intracranial abnormality. Ct Abdomen Pelvis W Iv Contrast    Result Date: 10/13/2019  EXAMINATION: CT OF THE ABDOMEN AND PELVIS WITH CONTRAST 10/13/2019 8:56 am TECHNIQUE: CT of the abdomen and pelvis was performed with the administration of intravenous contrast. Multiplanar reformatted images are provided for review. Dose modulation, iterative reconstruction, and/or weight based adjustment of the mA/kV was utilized to reduce the radiation dose to as low as reasonably achievable. COMPARISON: July 14, 2019 HISTORY: ORDERING SYSTEM PROVIDED HISTORY: ABDOMINAL PAIN TECHNOLOGIST PROVIDED HISTORY: IV contrast only. Thank you.  Reason for Exam: ABD PAIN, VOMITING BLOOD Acuity: Acute Type of Exam: Initial Additional signs and symptoms: NONE Relevant Medical/Surgical History: 75ML ISOVUE 370/ GFR>60 FINDINGS: Lower Chest: pregnant?->No Reason for Exam: seizure. FINDINGS: Limited by motion artifacts. INTRACRANIAL STRUCTURES/VENTRICLES: There is no acute infarct. No mass effect or midline shift. No evidence of an acute intracranial hemorrhage. The ventricles and sulci are normal in size and configuration. The sellar/suprasellar regions appear unremarkable. The normal signal voids within the major intracranial vessels appear maintained. The bilateral hippocampal and parahippocampal structures are within normal limits. ORBITS: The visualized portion of the orbits demonstrate no acute abnormality. SINUSES: There is scattered minimal mucosal thickening in the paranasal sinuses. There are trace bilateral mastoid effusions. BONES/SOFT TISSUES: The bone marrow signal intensity appears normal. The soft tissues demonstrate no acute abnormality. Motion artifacts. No acute intracranial abnormality. The bilateral hippocampal and parahippocampal structures are within normal limits. EKG     Rhythm: normal sinus   Rate: normal  Clinical Impression: no acute changes        The patient was seen and examined on day of discharge and this discharge summary is in conjunction with any daily progress note from day of discharge. Time Spent on discharge is   >35  min  in the examination, evaluation, counseling and review of medications and discharge plan.             Iker Boss MD   10/16/2019

## 2019-10-30 ENCOUNTER — HOSPITAL ENCOUNTER (OUTPATIENT)
Age: 49
Setting detail: SPECIMEN
Discharge: HOME | End: 2019-10-30
Payer: COMMERCIAL

## 2019-10-30 ENCOUNTER — OFFICE VISIT (OUTPATIENT)
Dept: INFECTIOUS DISEASES | Age: 49
End: 2019-10-30
Payer: COMMERCIAL

## 2019-10-30 VITALS
WEIGHT: 260 LBS | BODY MASS INDEX: 44.63 KG/M2 | DIASTOLIC BLOOD PRESSURE: 80 MMHG | HEART RATE: 69 BPM | OXYGEN SATURATION: 98 % | SYSTOLIC BLOOD PRESSURE: 128 MMHG

## 2019-10-30 DIAGNOSIS — M86.672 OTHER CHRONIC OSTEOMYELITIS OF LEFT FOOT (HCC): ICD-10-CM

## 2019-10-30 DIAGNOSIS — M86.472 CHRONIC OSTEOMYELITIS OF LEFT FOOT WITH DRAINING SINUS (HCC): Primary | ICD-10-CM

## 2019-10-30 LAB
FERRITIN: 345 NG/ML (ref 15–150)
IRON: 73 UG/DL (ref 37–145)
PCT TRANSFERRIN: 24 % (ref 10–44)
TOTAL IRON BINDING CAPACITY: 307 UG/DL (ref 250–450)
UNSATURATED IRON BINDING CAPACITY: 234 UG/DL (ref 110–370)

## 2019-10-30 PROCEDURE — G8482 FLU IMMUNIZE ORDER/ADMIN: HCPCS | Performed by: INTERNAL MEDICINE

## 2019-10-30 PROCEDURE — 82728 ASSAY OF FERRITIN: CPT

## 2019-10-30 PROCEDURE — 3017F COLORECTAL CA SCREEN DOC REV: CPT | Performed by: INTERNAL MEDICINE

## 2019-10-30 PROCEDURE — 1036F TOBACCO NON-USER: CPT | Performed by: INTERNAL MEDICINE

## 2019-10-30 PROCEDURE — 83540 ASSAY OF IRON: CPT

## 2019-10-30 PROCEDURE — G8417 CALC BMI ABV UP PARAM F/U: HCPCS | Performed by: INTERNAL MEDICINE

## 2019-10-30 PROCEDURE — 99214 OFFICE O/P EST MOD 30 MIN: CPT | Performed by: INTERNAL MEDICINE

## 2019-10-30 PROCEDURE — G8427 DOCREV CUR MEDS BY ELIG CLIN: HCPCS | Performed by: INTERNAL MEDICINE

## 2019-10-30 PROCEDURE — 83550 IRON BINDING TEST: CPT

## 2019-10-30 PROCEDURE — 1111F DSCHRG MED/CURRENT MED MERGE: CPT | Performed by: INTERNAL MEDICINE

## 2019-10-31 ENCOUNTER — HOSPITAL ENCOUNTER (EMERGENCY)
Age: 49
Discharge: HOME | End: 2019-10-31
Attending: EMERGENCY MEDICINE
Payer: COMMERCIAL

## 2019-10-31 ENCOUNTER — APPOINTMENT (OUTPATIENT)
Dept: GENERAL RADIOLOGY | Age: 49
End: 2019-10-31
Payer: COMMERCIAL

## 2019-10-31 VITALS
WEIGHT: 260 LBS | OXYGEN SATURATION: 100 % | BODY MASS INDEX: 44.39 KG/M2 | DIASTOLIC BLOOD PRESSURE: 88 MMHG | RESPIRATION RATE: 18 BRPM | HEART RATE: 80 BPM | SYSTOLIC BLOOD PRESSURE: 133 MMHG | HEIGHT: 64 IN | TEMPERATURE: 98.1 F

## 2019-10-31 DIAGNOSIS — R10.13 ABDOMINAL PAIN, EPIGASTRIC: Primary | ICD-10-CM

## 2019-10-31 DIAGNOSIS — D64.9 ANEMIA, UNSPECIFIED TYPE: ICD-10-CM

## 2019-10-31 DIAGNOSIS — R11.2 NON-INTRACTABLE VOMITING WITH NAUSEA, UNSPECIFIED VOMITING TYPE: ICD-10-CM

## 2019-10-31 LAB
ALBUMIN SERPL-MCNC: 3.8 GM/DL (ref 3.4–5)
ALP BLD-CCNC: 122 IU/L (ref 40–128)
ALT SERPL-CCNC: 22 U/L (ref 10–40)
ANION GAP SERPL CALCULATED.3IONS-SCNC: 10 MMOL/L (ref 4–16)
APTT: 28.4 SECONDS (ref 25.1–37.1)
AST SERPL-CCNC: 20 IU/L (ref 15–37)
BACTERIA: NEGATIVE /HPF
BASOPHILS ABSOLUTE: 0 K/CU MM
BASOPHILS RELATIVE PERCENT: 0.6 % (ref 0–1)
BILIRUB SERPL-MCNC: 0.2 MG/DL (ref 0–1)
BILIRUBIN URINE: NEGATIVE MG/DL
BLOOD, URINE: NEGATIVE
BUN BLDV-MCNC: 15 MG/DL (ref 6–23)
CALCIUM SERPL-MCNC: 10.2 MG/DL (ref 8.3–10.6)
CHLORIDE BLD-SCNC: 108 MMOL/L (ref 99–110)
CLARITY: ABNORMAL
CO2: 24 MMOL/L (ref 21–32)
COLOR: YELLOW
CREAT SERPL-MCNC: 0.7 MG/DL (ref 0.6–1.1)
DIFFERENTIAL TYPE: ABNORMAL
EOSINOPHILS ABSOLUTE: 0.3 K/CU MM
EOSINOPHILS RELATIVE PERCENT: 6.9 % (ref 0–3)
GFR AFRICAN AMERICAN: >60 ML/MIN/1.73M2
GFR NON-AFRICAN AMERICAN: >60 ML/MIN/1.73M2
GLUCOSE BLD-MCNC: 95 MG/DL (ref 70–99)
GLUCOSE, URINE: NEGATIVE MG/DL
HCG QUALITATIVE: NORMAL
HCT VFR BLD CALC: 30.4 % (ref 37–47)
HCT VFR BLD CALC: 32.7 % (ref 37–47)
HEMOGLOBIN: 9.1 GM/DL (ref 12.5–16)
HEMOGLOBIN: 9.9 GM/DL (ref 12.5–16)
HYALINE CASTS: 2 /LPF
HYPHENATED YEAST: ABNORMAL /HPF
IMMATURE NEUTROPHIL %: 0.2 % (ref 0–0.43)
INR BLD: 0.93 INDEX
KETONES, URINE: NEGATIVE MG/DL
LEUKOCYTE ESTERASE, URINE: NEGATIVE
LIPASE: 15 IU/L (ref 13–60)
LYMPHOCYTES ABSOLUTE: 1.6 K/CU MM
LYMPHOCYTES RELATIVE PERCENT: 34.6 % (ref 24–44)
MCH RBC QN AUTO: 24.4 PG (ref 27–31)
MCHC RBC AUTO-ENTMCNC: 30.3 % (ref 32–36)
MCV RBC AUTO: 80.5 FL (ref 78–100)
MONOCYTES ABSOLUTE: 0.4 K/CU MM
MONOCYTES RELATIVE PERCENT: 8.6 % (ref 0–4)
MUCUS: ABNORMAL HPF
NITRITE URINE, QUANTITATIVE: NEGATIVE
NUCLEATED RBC %: 0 %
PDW BLD-RTO: 18.6 % (ref 11.7–14.9)
PH, URINE: 5 (ref 5–8)
PLATELET # BLD: 199 K/CU MM (ref 140–440)
PMV BLD AUTO: 10.3 FL (ref 7.5–11.1)
POTASSIUM SERPL-SCNC: 4.2 MMOL/L (ref 3.5–5.1)
PROTEIN UA: NEGATIVE MG/DL
PROTHROMBIN TIME: 10.6 SECONDS (ref 11.7–14.5)
RBC # BLD: 4.06 M/CU MM (ref 4.2–5.4)
RBC URINE: 1 /HPF (ref 0–6)
SEGMENTED NEUTROPHILS ABSOLUTE COUNT: 2.3 K/CU MM
SEGMENTED NEUTROPHILS RELATIVE PERCENT: 49.1 % (ref 36–66)
SODIUM BLD-SCNC: 142 MMOL/L (ref 135–145)
SPECIFIC GRAVITY UA: 1.02 (ref 1–1.03)
SQUAMOUS EPITHELIAL: 3 /HPF
TOTAL IMMATURE NEUTOROPHIL: 0.01 K/CU MM
TOTAL NUCLEATED RBC: 0 K/CU MM
TOTAL PROTEIN: 6.3 GM/DL (ref 6.4–8.2)
TRICHOMONAS: ABNORMAL /HPF
TROPONIN T: <0.01 NG/ML
UROBILINOGEN, URINE: NORMAL MG/DL (ref 0.2–1)
WBC # BLD: 4.7 K/CU MM (ref 4–10.5)
WBC UA: 1 /HPF (ref 0–5)

## 2019-10-31 PROCEDURE — 80053 COMPREHEN METABOLIC PANEL: CPT

## 2019-10-31 PROCEDURE — 99284 EMERGENCY DEPT VISIT MOD MDM: CPT

## 2019-10-31 PROCEDURE — 86850 RBC ANTIBODY SCREEN: CPT

## 2019-10-31 PROCEDURE — 96372 THER/PROPH/DIAG INJ SC/IM: CPT

## 2019-10-31 PROCEDURE — 85730 THROMBOPLASTIN TIME PARTIAL: CPT

## 2019-10-31 PROCEDURE — 85014 HEMATOCRIT: CPT

## 2019-10-31 PROCEDURE — 71046 X-RAY EXAM CHEST 2 VIEWS: CPT

## 2019-10-31 PROCEDURE — 86901 BLOOD TYPING SEROLOGIC RH(D): CPT

## 2019-10-31 PROCEDURE — 84484 ASSAY OF TROPONIN QUANT: CPT

## 2019-10-31 PROCEDURE — 93010 ELECTROCARDIOGRAM REPORT: CPT | Performed by: INTERNAL MEDICINE

## 2019-10-31 PROCEDURE — 6360000002 HC RX W HCPCS: Performed by: PHYSICIAN ASSISTANT

## 2019-10-31 PROCEDURE — 84703 CHORIONIC GONADOTROPIN ASSAY: CPT

## 2019-10-31 PROCEDURE — 96374 THER/PROPH/DIAG INJ IV PUSH: CPT

## 2019-10-31 PROCEDURE — 85018 HEMOGLOBIN: CPT

## 2019-10-31 PROCEDURE — 81001 URINALYSIS AUTO W/SCOPE: CPT

## 2019-10-31 PROCEDURE — 2580000003 HC RX 258: Performed by: PHYSICIAN ASSISTANT

## 2019-10-31 PROCEDURE — 86922 COMPATIBILITY TEST ANTIGLOB: CPT

## 2019-10-31 PROCEDURE — C9113 INJ PANTOPRAZOLE SODIUM, VIA: HCPCS | Performed by: PHYSICIAN ASSISTANT

## 2019-10-31 PROCEDURE — 93005 ELECTROCARDIOGRAM TRACING: CPT | Performed by: PHYSICIAN ASSISTANT

## 2019-10-31 PROCEDURE — 85025 COMPLETE CBC W/AUTO DIFF WBC: CPT

## 2019-10-31 PROCEDURE — 96375 TX/PRO/DX INJ NEW DRUG ADDON: CPT

## 2019-10-31 PROCEDURE — 86900 BLOOD TYPING SEROLOGIC ABO: CPT

## 2019-10-31 PROCEDURE — 86905 BLOOD TYPING RBC ANTIGENS: CPT

## 2019-10-31 PROCEDURE — 83690 ASSAY OF LIPASE: CPT

## 2019-10-31 PROCEDURE — 85610 PROTHROMBIN TIME: CPT

## 2019-10-31 PROCEDURE — 6370000000 HC RX 637 (ALT 250 FOR IP): Performed by: PHYSICIAN ASSISTANT

## 2019-10-31 RX ORDER — PANTOPRAZOLE SODIUM 40 MG/10ML
40 INJECTION, POWDER, LYOPHILIZED, FOR SOLUTION INTRAVENOUS ONCE
Status: COMPLETED | OUTPATIENT
Start: 2019-10-31 | End: 2019-10-31

## 2019-10-31 RX ORDER — HALOPERIDOL 5 MG/ML
2 INJECTION INTRAMUSCULAR ONCE
Status: COMPLETED | OUTPATIENT
Start: 2019-10-31 | End: 2019-10-31

## 2019-10-31 RX ORDER — ONDANSETRON 2 MG/ML
4 INJECTION INTRAMUSCULAR; INTRAVENOUS EVERY 30 MIN PRN
Status: DISCONTINUED | OUTPATIENT
Start: 2019-10-31 | End: 2019-10-31 | Stop reason: HOSPADM

## 2019-10-31 RX ORDER — 0.9 % SODIUM CHLORIDE 0.9 %
1000 INTRAVENOUS SOLUTION INTRAVENOUS ONCE
Status: COMPLETED | OUTPATIENT
Start: 2019-10-31 | End: 2019-10-31

## 2019-10-31 RX ORDER — PROMETHAZINE HYDROCHLORIDE 25 MG/ML
25 INJECTION, SOLUTION INTRAMUSCULAR; INTRAVENOUS ONCE
Status: COMPLETED | OUTPATIENT
Start: 2019-10-31 | End: 2019-10-31

## 2019-10-31 RX ORDER — HEPARIN SODIUM (PORCINE) LOCK FLUSH IV SOLN 100 UNIT/ML 100 UNIT/ML
100 SOLUTION INTRAVENOUS ONCE
Status: COMPLETED | OUTPATIENT
Start: 2019-10-31 | End: 2019-10-31

## 2019-10-31 RX ORDER — OXYCODONE AND ACETAMINOPHEN 7.5; 325 MG/1; MG/1
1 TABLET ORAL ONCE
Status: COMPLETED | OUTPATIENT
Start: 2019-10-31 | End: 2019-10-31

## 2019-10-31 RX ORDER — PROMETHAZINE HYDROCHLORIDE 25 MG/1
25 TABLET ORAL EVERY 6 HOURS PRN
Qty: 8 TABLET | Refills: 0 | Status: SHIPPED | OUTPATIENT
Start: 2019-10-31 | End: 2019-11-07

## 2019-10-31 RX ADMIN — SODIUM CHLORIDE 1000 ML: 9 INJECTION, SOLUTION INTRAVENOUS at 13:30

## 2019-10-31 RX ADMIN — PROMETHAZINE HYDROCHLORIDE 25 MG: 25 INJECTION INTRAMUSCULAR; INTRAVENOUS at 14:11

## 2019-10-31 RX ADMIN — HALOPERIDOL LACTATE 2 MG: 5 INJECTION, SOLUTION INTRAMUSCULAR at 15:44

## 2019-10-31 RX ADMIN — OXYCODONE HYDROCHLORIDE AND ACETAMINOPHEN 1 TABLET: 7.5; 325 TABLET ORAL at 15:44

## 2019-10-31 RX ADMIN — Medication 100 UNITS: at 18:12

## 2019-10-31 RX ADMIN — PANTOPRAZOLE SODIUM 40 MG: 40 INJECTION, POWDER, FOR SOLUTION INTRAVENOUS at 15:01

## 2019-10-31 RX ADMIN — ONDANSETRON 4 MG: 2 INJECTION INTRAMUSCULAR; INTRAVENOUS at 13:30

## 2019-10-31 ASSESSMENT — PAIN SCALES - GENERAL
PAINLEVEL_OUTOF10: 8
PAINLEVEL_OUTOF10: 8
PAINLEVEL_OUTOF10: 0

## 2019-10-31 ASSESSMENT — PAIN DESCRIPTION - LOCATION
LOCATION: ABDOMEN
LOCATION: ABDOMEN

## 2019-10-31 ASSESSMENT — PAIN DESCRIPTION - PAIN TYPE
TYPE: CHRONIC PAIN
TYPE: ACUTE PAIN

## 2019-11-02 LAB
ABO/RH: NORMAL
ANTIBODY SCREEN: NEGATIVE
COMMENT: NORMAL
COMPONENT: NORMAL
COMPONENT: NORMAL
CROSSMATCH RESULT: NORMAL
CROSSMATCH RESULT: NORMAL
STATUS: NORMAL
STATUS: NORMAL
TRANSFUSION STATUS: NORMAL
TRANSFUSION STATUS: NORMAL
UNIT DIVISION: 0
UNIT DIVISION: 0
UNIT NUMBER: NORMAL
UNIT NUMBER: NORMAL

## 2019-11-07 LAB
EKG ATRIAL RATE: 61 BPM
EKG DIAGNOSIS: NORMAL
EKG P AXIS: 50 DEGREES
EKG P-R INTERVAL: 192 MS
EKG Q-T INTERVAL: 410 MS
EKG QRS DURATION: 84 MS
EKG QTC CALCULATION (BAZETT): 412 MS
EKG R AXIS: -7 DEGREES
EKG T AXIS: 32 DEGREES
EKG VENTRICULAR RATE: 61 BPM

## 2019-12-03 ENCOUNTER — HOSPITAL ENCOUNTER (OUTPATIENT)
Age: 49
Setting detail: SPECIMEN
Discharge: HOME | End: 2019-12-03
Payer: COMMERCIAL

## 2019-12-03 LAB
FERRITIN: 58 NG/ML (ref 15–150)
IRON: 59 UG/DL (ref 37–145)
PCT TRANSFERRIN: 20 % (ref 10–44)
TOTAL IRON BINDING CAPACITY: 299 UG/DL (ref 250–450)
UNSATURATED IRON BINDING CAPACITY: 240 UG/DL (ref 110–370)
VITAMIN B-12: 694.4 PG/ML (ref 211–911)

## 2019-12-03 PROCEDURE — 83540 ASSAY OF IRON: CPT

## 2019-12-03 PROCEDURE — 83550 IRON BINDING TEST: CPT

## 2019-12-03 PROCEDURE — 82728 ASSAY OF FERRITIN: CPT

## 2019-12-03 PROCEDURE — 82607 VITAMIN B-12: CPT

## 2019-12-04 ENCOUNTER — OFFICE VISIT (OUTPATIENT)
Dept: BARIATRICS/WEIGHT MGMT | Age: 49
End: 2019-12-04
Payer: COMMERCIAL

## 2019-12-04 ENCOUNTER — OFFICE VISIT (OUTPATIENT)
Dept: INFECTIOUS DISEASES | Age: 49
End: 2019-12-04
Payer: COMMERCIAL

## 2019-12-04 VITALS
HEART RATE: 69 BPM | SYSTOLIC BLOOD PRESSURE: 112 MMHG | BODY MASS INDEX: 44.59 KG/M2 | OXYGEN SATURATION: 98 % | WEIGHT: 261.2 LBS | HEIGHT: 64 IN | DIASTOLIC BLOOD PRESSURE: 74 MMHG

## 2019-12-04 VITALS
OXYGEN SATURATION: 98 % | WEIGHT: 263 LBS | HEART RATE: 72 BPM | DIASTOLIC BLOOD PRESSURE: 72 MMHG | SYSTOLIC BLOOD PRESSURE: 118 MMHG | BODY MASS INDEX: 45.14 KG/M2

## 2019-12-04 DIAGNOSIS — M86.472 CHRONIC OSTEOMYELITIS OF LEFT FOOT WITH DRAINING SINUS (HCC): Primary | ICD-10-CM

## 2019-12-04 DIAGNOSIS — I89.9 LYMPH NODE DISORDER: Primary | ICD-10-CM

## 2019-12-04 PROCEDURE — G8417 CALC BMI ABV UP PARAM F/U: HCPCS | Performed by: INTERNAL MEDICINE

## 2019-12-04 PROCEDURE — 99213 OFFICE O/P EST LOW 20 MIN: CPT | Performed by: INTERNAL MEDICINE

## 2019-12-04 PROCEDURE — 1036F TOBACCO NON-USER: CPT | Performed by: INTERNAL MEDICINE

## 2019-12-04 PROCEDURE — 3017F COLORECTAL CA SCREEN DOC REV: CPT | Performed by: SURGERY

## 2019-12-04 PROCEDURE — G8427 DOCREV CUR MEDS BY ELIG CLIN: HCPCS | Performed by: INTERNAL MEDICINE

## 2019-12-04 PROCEDURE — G8427 DOCREV CUR MEDS BY ELIG CLIN: HCPCS | Performed by: SURGERY

## 2019-12-04 PROCEDURE — G8482 FLU IMMUNIZE ORDER/ADMIN: HCPCS | Performed by: SURGERY

## 2019-12-04 PROCEDURE — 3017F COLORECTAL CA SCREEN DOC REV: CPT | Performed by: INTERNAL MEDICINE

## 2019-12-04 PROCEDURE — G8482 FLU IMMUNIZE ORDER/ADMIN: HCPCS | Performed by: INTERNAL MEDICINE

## 2019-12-04 PROCEDURE — 99214 OFFICE O/P EST MOD 30 MIN: CPT | Performed by: SURGERY

## 2019-12-04 PROCEDURE — 1036F TOBACCO NON-USER: CPT | Performed by: SURGERY

## 2019-12-04 PROCEDURE — G8417 CALC BMI ABV UP PARAM F/U: HCPCS | Performed by: SURGERY

## 2019-12-04 RX ORDER — PROMETHAZINE HYDROCHLORIDE 25 MG/1
25 TABLET ORAL EVERY 6 HOURS PRN
Qty: 30 TABLET | Refills: 2 | Status: SHIPPED | OUTPATIENT
Start: 2019-12-04 | End: 2020-01-27 | Stop reason: SDUPTHER

## 2019-12-04 RX ORDER — PANTOPRAZOLE SODIUM 40 MG/1
40 TABLET, DELAYED RELEASE ORAL
Qty: 90 TABLET | Refills: 1 | Status: SHIPPED | OUTPATIENT
Start: 2019-12-04 | End: 2020-05-06 | Stop reason: SDUPTHER

## 2019-12-04 ASSESSMENT — ENCOUNTER SYMPTOMS
COUGH: 0
VOMITING: 0
PHOTOPHOBIA: 0
TROUBLE SWALLOWING: 0
SORE THROAT: 0
DIARRHEA: 0
CONSTIPATION: 0
NAUSEA: 0
SHORTNESS OF BREATH: 0
WHEEZING: 0
COLOR CHANGE: 0
BLOOD IN STOOL: 0
VOICE CHANGE: 0
ANAL BLEEDING: 0
ABDOMINAL PAIN: 1

## 2020-01-09 ENCOUNTER — APPOINTMENT (OUTPATIENT)
Dept: MRI IMAGING | Age: 50
DRG: 315 | End: 2020-01-09
Payer: COMMERCIAL

## 2020-01-09 ENCOUNTER — HOSPITAL ENCOUNTER (INPATIENT)
Age: 50
LOS: 10 days | Discharge: HOME HEALTH | DRG: 315 | End: 2020-01-23
Attending: EMERGENCY MEDICINE | Admitting: FAMILY MEDICINE
Payer: COMMERCIAL

## 2020-01-09 ENCOUNTER — APPOINTMENT (OUTPATIENT)
Dept: CT IMAGING | Age: 50
DRG: 315 | End: 2020-01-09
Payer: COMMERCIAL

## 2020-01-09 PROBLEM — R42 DIZZINESS: Status: ACTIVE | Noted: 2020-01-09

## 2020-01-09 LAB
ALBUMIN SERPL-MCNC: 3.8 GM/DL (ref 3.4–5)
ALP BLD-CCNC: 121 IU/L (ref 40–129)
ALT SERPL-CCNC: 20 U/L (ref 10–40)
ANION GAP SERPL CALCULATED.3IONS-SCNC: 10 MMOL/L (ref 4–16)
APTT: NORMAL SECONDS (ref 25.1–37.1)
AST SERPL-CCNC: 28 IU/L (ref 15–37)
BACTERIA: ABNORMAL /HPF
BASOPHILS ABSOLUTE: 0 K/CU MM
BASOPHILS RELATIVE PERCENT: 0.6 % (ref 0–1)
BILIRUB SERPL-MCNC: 0.3 MG/DL (ref 0–1)
BILIRUBIN URINE: NEGATIVE MG/DL
BLOOD, URINE: NEGATIVE
BUN BLDV-MCNC: 15 MG/DL (ref 6–23)
CALCIUM SERPL-MCNC: 9.8 MG/DL (ref 8.3–10.6)
CHLORIDE BLD-SCNC: 106 MMOL/L (ref 99–110)
CLARITY: ABNORMAL
CO2: 19 MMOL/L (ref 21–32)
COLOR: YELLOW
CREAT SERPL-MCNC: 0.6 MG/DL (ref 0.6–1.1)
DIFFERENTIAL TYPE: ABNORMAL
EOSINOPHILS ABSOLUTE: 0.3 K/CU MM
EOSINOPHILS RELATIVE PERCENT: 5.4 % (ref 0–3)
GFR AFRICAN AMERICAN: >60 ML/MIN/1.73M2
GFR NON-AFRICAN AMERICAN: >60 ML/MIN/1.73M2
GLUCOSE BLD-MCNC: 103 MG/DL (ref 70–99)
GLUCOSE, URINE: NEGATIVE MG/DL
HCT VFR BLD CALC: 37.8 % (ref 37–47)
HEMOGLOBIN: 11.6 GM/DL (ref 12.5–16)
IMMATURE NEUTROPHIL %: 0.2 % (ref 0–0.43)
INR BLD: NORMAL INDEX
KETONES, URINE: NEGATIVE MG/DL
LACTATE: 0.9 MMOL/L (ref 0.4–2)
LEUKOCYTE ESTERASE, URINE: NEGATIVE
LIPASE: 12 IU/L (ref 13–60)
LYMPHOCYTES ABSOLUTE: 1.9 K/CU MM
LYMPHOCYTES RELATIVE PERCENT: 37.3 % (ref 24–44)
MCH RBC QN AUTO: 26.3 PG (ref 27–31)
MCHC RBC AUTO-ENTMCNC: 30.7 % (ref 32–36)
MCV RBC AUTO: 85.7 FL (ref 78–100)
MONOCYTES ABSOLUTE: 0.4 K/CU MM
MONOCYTES RELATIVE PERCENT: 7.9 % (ref 0–4)
NITRITE URINE, QUANTITATIVE: NEGATIVE
NUCLEATED RBC %: 0 %
PDW BLD-RTO: 14.1 % (ref 11.7–14.9)
PH, URINE: 7 (ref 5–8)
PLATELET # BLD: 192 K/CU MM (ref 140–440)
PMV BLD AUTO: 10.2 FL (ref 7.5–11.1)
POTASSIUM SERPL-SCNC: 5 MMOL/L (ref 3.5–5.1)
PROTEIN UA: NEGATIVE MG/DL
PROTHROMBIN TIME: NORMAL SECONDS (ref 11.7–14.5)
RBC # BLD: 4.41 M/CU MM (ref 4.2–5.4)
RBC URINE: ABNORMAL /HPF (ref 0–6)
SEGMENTED NEUTROPHILS ABSOLUTE COUNT: 2.4 K/CU MM
SEGMENTED NEUTROPHILS RELATIVE PERCENT: 48.6 % (ref 36–66)
SODIUM BLD-SCNC: 135 MMOL/L (ref 135–145)
SPECIFIC GRAVITY UA: 1.01 (ref 1–1.03)
SQUAMOUS EPITHELIAL: 14 /HPF
TOTAL IMMATURE NEUTOROPHIL: 0.01 K/CU MM
TOTAL NUCLEATED RBC: 0 K/CU MM
TOTAL PROTEIN: 6.2 GM/DL (ref 6.4–8.2)
TRICHOMONAS: ABNORMAL /HPF
TROPONIN T: <0.01 NG/ML
UROBILINOGEN, URINE: NORMAL MG/DL (ref 0.2–1)
WBC # BLD: 5 K/CU MM (ref 4–10.5)
WBC UA: <1 /HPF (ref 0–5)

## 2020-01-09 PROCEDURE — 6360000002 HC RX W HCPCS: Performed by: FAMILY MEDICINE

## 2020-01-09 PROCEDURE — 2500000003 HC RX 250 WO HCPCS: Performed by: FAMILY MEDICINE

## 2020-01-09 PROCEDURE — C9113 INJ PANTOPRAZOLE SODIUM, VIA: HCPCS | Performed by: EMERGENCY MEDICINE

## 2020-01-09 PROCEDURE — G0378 HOSPITAL OBSERVATION PER HR: HCPCS

## 2020-01-09 PROCEDURE — 6360000002 HC RX W HCPCS: Performed by: EMERGENCY MEDICINE

## 2020-01-09 PROCEDURE — 74177 CT ABD & PELVIS W/CONTRAST: CPT

## 2020-01-09 PROCEDURE — 81001 URINALYSIS AUTO W/SCOPE: CPT

## 2020-01-09 PROCEDURE — 96361 HYDRATE IV INFUSION ADD-ON: CPT

## 2020-01-09 PROCEDURE — 83690 ASSAY OF LIPASE: CPT

## 2020-01-09 PROCEDURE — 80053 COMPREHEN METABOLIC PANEL: CPT

## 2020-01-09 PROCEDURE — 6370000000 HC RX 637 (ALT 250 FOR IP): Performed by: FAMILY MEDICINE

## 2020-01-09 PROCEDURE — 85610 PROTHROMBIN TIME: CPT

## 2020-01-09 PROCEDURE — 84484 ASSAY OF TROPONIN QUANT: CPT

## 2020-01-09 PROCEDURE — 96376 TX/PRO/DX INJ SAME DRUG ADON: CPT

## 2020-01-09 PROCEDURE — 85730 THROMBOPLASTIN TIME PARTIAL: CPT

## 2020-01-09 PROCEDURE — 96374 THER/PROPH/DIAG INJ IV PUSH: CPT

## 2020-01-09 PROCEDURE — 85025 COMPLETE CBC W/AUTO DIFF WBC: CPT

## 2020-01-09 PROCEDURE — 99285 EMERGENCY DEPT VISIT HI MDM: CPT

## 2020-01-09 PROCEDURE — 96372 THER/PROPH/DIAG INJ SC/IM: CPT

## 2020-01-09 PROCEDURE — 2580000003 HC RX 258: Performed by: EMERGENCY MEDICINE

## 2020-01-09 PROCEDURE — 83605 ASSAY OF LACTIC ACID: CPT

## 2020-01-09 PROCEDURE — 96375 TX/PRO/DX INJ NEW DRUG ADDON: CPT

## 2020-01-09 PROCEDURE — 93005 ELECTROCARDIOGRAM TRACING: CPT | Performed by: EMERGENCY MEDICINE

## 2020-01-09 PROCEDURE — 70450 CT HEAD/BRAIN W/O DYE: CPT

## 2020-01-09 PROCEDURE — 70544 MR ANGIOGRAPHY HEAD W/O DYE: CPT

## 2020-01-09 PROCEDURE — 70551 MRI BRAIN STEM W/O DYE: CPT

## 2020-01-09 PROCEDURE — 6360000004 HC RX CONTRAST MEDICATION: Performed by: EMERGENCY MEDICINE

## 2020-01-09 RX ORDER — TIZANIDINE 4 MG/1
4 TABLET ORAL 2 TIMES DAILY
Status: DISCONTINUED | OUTPATIENT
Start: 2020-01-09 | End: 2020-01-23 | Stop reason: HOSPADM

## 2020-01-09 RX ORDER — SODIUM CHLORIDE 0.9 % (FLUSH) 0.9 %
10 SYRINGE (ML) INJECTION 2 TIMES DAILY
Status: DISCONTINUED | OUTPATIENT
Start: 2020-01-09 | End: 2020-01-09 | Stop reason: SDUPTHER

## 2020-01-09 RX ORDER — MORPHINE SULFATE 2 MG/ML
2 INJECTION, SOLUTION INTRAMUSCULAR; INTRAVENOUS EVERY 4 HOURS PRN
Status: DISCONTINUED | OUTPATIENT
Start: 2020-01-09 | End: 2020-01-13

## 2020-01-09 RX ORDER — OXYCODONE HYDROCHLORIDE AND ACETAMINOPHEN 5; 325 MG/1; MG/1
1 TABLET ORAL EVERY 8 HOURS PRN
Status: DISCONTINUED | OUTPATIENT
Start: 2020-01-09 | End: 2020-01-17

## 2020-01-09 RX ORDER — PANTOPRAZOLE SODIUM 40 MG/1
40 TABLET, DELAYED RELEASE ORAL
Status: DISCONTINUED | OUTPATIENT
Start: 2020-01-10 | End: 2020-01-23 | Stop reason: HOSPADM

## 2020-01-09 RX ORDER — PANTOPRAZOLE SODIUM 40 MG/10ML
40 INJECTION, POWDER, LYOPHILIZED, FOR SOLUTION INTRAVENOUS ONCE
Status: COMPLETED | OUTPATIENT
Start: 2020-01-09 | End: 2020-01-09

## 2020-01-09 RX ORDER — ONDANSETRON 4 MG/1
4 TABLET, ORALLY DISINTEGRATING ORAL EVERY 8 HOURS PRN
Status: DISCONTINUED | OUTPATIENT
Start: 2020-01-09 | End: 2020-01-23 | Stop reason: HOSPADM

## 2020-01-09 RX ORDER — M-VIT,TX,IRON,MINS/CALC/FOLIC 27MG-0.4MG
1 TABLET ORAL DAILY
Status: DISCONTINUED | OUTPATIENT
Start: 2020-01-09 | End: 2020-01-23 | Stop reason: HOSPADM

## 2020-01-09 RX ORDER — PROMETHAZINE HYDROCHLORIDE 25 MG/ML
25 INJECTION, SOLUTION INTRAMUSCULAR; INTRAVENOUS ONCE
Status: COMPLETED | OUTPATIENT
Start: 2020-01-09 | End: 2020-01-09

## 2020-01-09 RX ORDER — PROMETHAZINE HYDROCHLORIDE 25 MG/1
25 TABLET ORAL EVERY 6 HOURS PRN
Status: DISCONTINUED | OUTPATIENT
Start: 2020-01-09 | End: 2020-01-23 | Stop reason: HOSPADM

## 2020-01-09 RX ORDER — MECLIZINE HYDROCHLORIDE 25 MG/1
25 TABLET ORAL 3 TIMES DAILY PRN
Status: DISCONTINUED | OUTPATIENT
Start: 2020-01-09 | End: 2020-01-10

## 2020-01-09 RX ORDER — GABAPENTIN 400 MG/1
800 CAPSULE ORAL 3 TIMES DAILY
Status: DISCONTINUED | OUTPATIENT
Start: 2020-01-09 | End: 2020-01-23 | Stop reason: HOSPADM

## 2020-01-09 RX ORDER — 0.9 % SODIUM CHLORIDE 0.9 %
1000 INTRAVENOUS SOLUTION INTRAVENOUS ONCE
Status: COMPLETED | OUTPATIENT
Start: 2020-01-09 | End: 2020-01-09

## 2020-01-09 RX ORDER — LEVOTHYROXINE SODIUM 0.12 MG/1
125 TABLET ORAL DAILY
Status: DISCONTINUED | OUTPATIENT
Start: 2020-01-10 | End: 2020-01-23 | Stop reason: HOSPADM

## 2020-01-09 RX ORDER — LACOSAMIDE 50 MG/1
50 TABLET ORAL 2 TIMES DAILY
Status: DISCONTINUED | OUTPATIENT
Start: 2020-01-09 | End: 2020-01-11

## 2020-01-09 RX ORDER — HEPARIN SODIUM (PORCINE) LOCK FLUSH IV SOLN 100 UNIT/ML 100 UNIT/ML
100 SOLUTION INTRAVENOUS ONCE
Status: DISCONTINUED | OUTPATIENT
Start: 2020-01-09 | End: 2020-01-09

## 2020-01-09 RX ORDER — LEVETIRACETAM 500 MG/1
1000 TABLET ORAL 2 TIMES DAILY
Status: DISCONTINUED | OUTPATIENT
Start: 2020-01-09 | End: 2020-01-11

## 2020-01-09 RX ORDER — ONDANSETRON 2 MG/ML
4 INJECTION INTRAMUSCULAR; INTRAVENOUS EVERY 6 HOURS PRN
Status: DISCONTINUED | OUTPATIENT
Start: 2020-01-09 | End: 2020-01-23 | Stop reason: HOSPADM

## 2020-01-09 RX ORDER — MORPHINE SULFATE 4 MG/ML
4 INJECTION, SOLUTION INTRAMUSCULAR; INTRAVENOUS ONCE
Status: COMPLETED | OUTPATIENT
Start: 2020-01-09 | End: 2020-01-09

## 2020-01-09 RX ORDER — HEPARIN SODIUM (PORCINE) LOCK FLUSH IV SOLN 100 UNIT/ML 100 UNIT/ML
100 SOLUTION INTRAVENOUS
Status: COMPLETED | OUTPATIENT
Start: 2020-01-09 | End: 2020-01-09

## 2020-01-09 RX ORDER — FERROUS SULFATE 325(65) MG
325 TABLET ORAL 2 TIMES DAILY
Status: DISCONTINUED | OUTPATIENT
Start: 2020-01-09 | End: 2020-01-23 | Stop reason: HOSPADM

## 2020-01-09 RX ORDER — HYDROMORPHONE HCL 110MG/55ML
1 PATIENT CONTROLLED ANALGESIA SYRINGE INTRAVENOUS ONCE
Status: COMPLETED | OUTPATIENT
Start: 2020-01-09 | End: 2020-01-09

## 2020-01-09 RX ORDER — DICYCLOMINE HCL 20 MG
20 TABLET ORAL EVERY 6 HOURS
Status: DISCONTINUED | OUTPATIENT
Start: 2020-01-09 | End: 2020-01-23 | Stop reason: HOSPADM

## 2020-01-09 RX ORDER — LORAZEPAM 0.5 MG/1
0.5 TABLET ORAL EVERY 12 HOURS PRN
Status: DISCONTINUED | OUTPATIENT
Start: 2020-01-09 | End: 2020-01-23 | Stop reason: HOSPADM

## 2020-01-09 RX ORDER — ATENOLOL 25 MG/1
25 TABLET ORAL DAILY
Status: DISCONTINUED | OUTPATIENT
Start: 2020-01-09 | End: 2020-01-23 | Stop reason: HOSPADM

## 2020-01-09 RX ORDER — DEXTROSE, SODIUM CHLORIDE, AND POTASSIUM CHLORIDE 5; .45; .15 G/100ML; G/100ML; G/100ML
INJECTION INTRAVENOUS CONTINUOUS
Status: DISCONTINUED | OUTPATIENT
Start: 2020-01-09 | End: 2020-01-18

## 2020-01-09 RX ORDER — PAROXETINE HYDROCHLORIDE 20 MG/1
40 TABLET, FILM COATED ORAL NIGHTLY
Status: DISCONTINUED | OUTPATIENT
Start: 2020-01-09 | End: 2020-01-23 | Stop reason: HOSPADM

## 2020-01-09 RX ORDER — SODIUM CHLORIDE 0.9 % (FLUSH) 0.9 %
10 SYRINGE (ML) INJECTION PRN
Status: DISCONTINUED | OUTPATIENT
Start: 2020-01-09 | End: 2020-01-15 | Stop reason: SDUPTHER

## 2020-01-09 RX ORDER — ONDANSETRON 2 MG/ML
4 INJECTION INTRAMUSCULAR; INTRAVENOUS EVERY 30 MIN PRN
Status: DISCONTINUED | OUTPATIENT
Start: 2020-01-09 | End: 2020-01-09 | Stop reason: SDUPTHER

## 2020-01-09 RX ORDER — SODIUM CHLORIDE 0.9 % (FLUSH) 0.9 %
10 SYRINGE (ML) INJECTION EVERY 12 HOURS SCHEDULED
Status: DISCONTINUED | OUTPATIENT
Start: 2020-01-09 | End: 2020-01-15 | Stop reason: SDUPTHER

## 2020-01-09 RX ADMIN — TIZANIDINE 4 MG: 4 TABLET ORAL at 22:23

## 2020-01-09 RX ADMIN — LACOSAMIDE 50 MG: 50 TABLET, FILM COATED ORAL at 22:22

## 2020-01-09 RX ADMIN — LORAZEPAM 0.5 MG: 0.5 TABLET ORAL at 23:16

## 2020-01-09 RX ADMIN — IOPAMIDOL 80 ML: 755 INJECTION, SOLUTION INTRAVENOUS at 18:04

## 2020-01-09 RX ADMIN — Medication 10 ML: at 18:05

## 2020-01-09 RX ADMIN — Medication 100 UNITS: at 22:17

## 2020-01-09 RX ADMIN — PROMETHAZINE HYDROCHLORIDE 25 MG: 25 TABLET ORAL at 20:59

## 2020-01-09 RX ADMIN — ENOXAPARIN SODIUM 40 MG: 40 INJECTION SUBCUTANEOUS at 22:24

## 2020-01-09 RX ADMIN — ONDANSETRON 4 MG: 2 INJECTION INTRAMUSCULAR; INTRAVENOUS at 18:24

## 2020-01-09 RX ADMIN — POTASSIUM CHLORIDE, DEXTROSE MONOHYDRATE AND SODIUM CHLORIDE: 150; 5; 450 INJECTION, SOLUTION INTRAVENOUS at 20:36

## 2020-01-09 RX ADMIN — Medication 5000 UNITS: at 22:22

## 2020-01-09 RX ADMIN — MORPHINE SULFATE 4 MG: 4 INJECTION, SOLUTION INTRAMUSCULAR; INTRAVENOUS at 15:44

## 2020-01-09 RX ADMIN — PROMETHAZINE HYDROCHLORIDE 25 MG: 25 INJECTION INTRAMUSCULAR; INTRAVENOUS at 15:44

## 2020-01-09 RX ADMIN — GABAPENTIN 800 MG: 400 CAPSULE ORAL at 22:23

## 2020-01-09 RX ADMIN — HYDROMORPHONE HYDROCHLORIDE 1 MG: 2 INJECTION, SOLUTION INTRAMUSCULAR; INTRAVENOUS; SUBCUTANEOUS at 19:18

## 2020-01-09 RX ADMIN — PAROXETINE HYDROCHLORIDE 40 MG: 20 TABLET, FILM COATED ORAL at 22:24

## 2020-01-09 RX ADMIN — HYDROMORPHONE HYDROCHLORIDE 1 MG: 2 INJECTION, SOLUTION INTRAMUSCULAR; INTRAVENOUS; SUBCUTANEOUS at 17:09

## 2020-01-09 RX ADMIN — PANTOPRAZOLE SODIUM 40 MG: 40 INJECTION, POWDER, FOR SOLUTION INTRAVENOUS at 15:44

## 2020-01-09 RX ADMIN — LEVETIRACETAM 1000 MG: 500 TABLET, FILM COATED ORAL at 22:23

## 2020-01-09 RX ADMIN — SODIUM CHLORIDE 1000 ML: 9 INJECTION, SOLUTION INTRAVENOUS at 15:43

## 2020-01-09 RX ADMIN — MORPHINE SULFATE 2 MG: 2 INJECTION, SOLUTION INTRAMUSCULAR; INTRAVENOUS at 20:59

## 2020-01-09 RX ADMIN — DICYCLOMINE HYDROCHLORIDE 20 MG: 20 TABLET ORAL at 22:23

## 2020-01-09 ASSESSMENT — PAIN SCALES - GENERAL
PAINLEVEL_OUTOF10: 8
PAINLEVEL_OUTOF10: 7
PAINLEVEL_OUTOF10: 0
PAINLEVEL_OUTOF10: 8

## 2020-01-09 ASSESSMENT — PAIN DESCRIPTION - LOCATION: LOCATION: ABDOMEN

## 2020-01-09 ASSESSMENT — PAIN DESCRIPTION - PAIN TYPE: TYPE: ACUTE PAIN

## 2020-01-09 NOTE — ED NOTES
Patient requesting more medication for pain, verbal order per Dr. Zaria Fu.       Jonatan Scott RN  01/09/20 8954

## 2020-01-09 NOTE — ED NOTES
Per lab the blue tube had a clot in it and the results may be inaccurate      Miriam Brooks RN  01/09/20 1800

## 2020-01-09 NOTE — H&P
agitation. Endocrine: Negative for polydipsia,polyuria,heat /cold intolerance. Past Medical History:   has a past medical history of Acid reflux, Anemia, Anxiety, Arthritis, Bleeding ulcer, Bronchitis, Chronic back pain, Chronic pain, COPD (chronic obstructive pulmonary disease) ([address] Depression, Fibromyalgia, H/O echocardiogram, H/O echocardiogram, Hiatal hernia, History of blood transfusion, [facility] (hard of hearing), Hx of cardiac catheterization, Hx of transesophageal echocardiography (PILO) for monitoring, HX OTHER MEDICAL, HX OTHER MEDICAL, Hypertension, Lupus ([address] Morbid obesity ([address] Nausea, Pain management, Pancreatitis chronic, Pneumonia, Shortness of breath on exertion, Shortness of breath on exertion, Sleep apnea, Staph infection, Staph infection, Teeth missing, Thyroid disease, UTI (urinary tract infection), and Wears glasses. Past Surgical History:   has a past surgical history that includes Lung removal, partial (Left, );  section (1991); Foot surgery (Left, Last Done In ); Dental surgery; Cholecystectomy, laparoscopic ('T); other surgical history (1998); other surgical history (Last Done 7-15-16); Tonsillectomy and adenoidectomy (); Appendectomy (1998); Upper gastrointestinal endoscopy (2018); Lap Band (~); Hysterectomy (10/1997); Endoscopy, colon, diagnostic (Last Done In 2018); Colonoscopy (Last Done In ); Cardiac catheterization (10/24/2010); Sleeve Gastrectomy (N/A, 2019); hiatal hernia repair (N/A, 2019); Upper gastrointestinal endoscopy (N/A, 2019); Foot Debridement (Left, 2019); Upper gastrointestinal endoscopy (N/A, 2019); Catheter Removal (N/A, 2019); Upper gastrointestinal endoscopy (N/A, 2019); INSERTION / REMOVAL / REPLACEMENT VENOUS ACCESS CATHETER (N/A, 8/15/2019); and Upper gastrointestinal endoscopy (N/A, 10/14/2019).      Medications:  No current facility-administered medications on file prior to encounter. Current Outpatient Medications on File Prior to Encounter   Medication Sig Dispense Refill    promethazine (PHENERGAN) 25 MG tablet Take 1 tablet by mouth every 6 hours as needed for Nausea 30 tablet 2    pantoprazole (PROTONIX) 40 MG tablet Take 1 tablet by mouth every morning (before breakfast) 90 tablet 1    levETIRAcetam (KEPPRA) 1000 MG tablet Take 1 tablet by mouth 2 times daily 60 tablet 3    lacosamide (VIMPAT) 50 MG TABS tablet Take 1 tablet by mouth 2 times daily for 14 days. 28 tablet 0    oxyCODONE-acetaminophen (PERCOCET) 5-325 MG per tablet Take 1 tablet by mouth every 8 hours as needed for Pain.  Nutritional Supplements (ENSURE HIGH PROTEIN) LIQD Take 1 Can by mouth Daily with lunch 32 Can 3    ondansetron (ZOFRAN ODT) 4 MG disintegrating tablet Take 1 tablet by mouth every 8 hours as needed for Nausea or Vomiting 30 tablet 2    Cholecalciferol (VITAMIN D3) 5000 units TABS Take 1 tablet by mouth daily      ferrous sulfate (FE TABS) 325 (65 Fe) MG EC tablet Take 1 tablet by mouth 2 times daily 60 tablet 5    tiZANidine (ZANAFLEX) 4 MG tablet Take 4 mg by mouth 2 times daily      estradiol (ESTRACE) 0.5 MG tablet Take 0.5 mg by mouth daily      dicyclomine (BENTYL) 20 MG tablet Take 20 mg by mouth every 6 hours      atenolol (TENORMIN) 25 MG tablet Take 25 mg by mouth daily      Multiple Vitamin (MVI, BARIATRIC ADVANTAGE MULTI-FORMULA, CHEW TAB) Take 1 tablet by mouth daily 30 tablet 5    Calcium Citrate-Vitamin D (CALCIUM + VIT D, BARIATRIC ADVANTAGE, CHEWABLE TABLET) Take 1 tablet by mouth 2 times daily 120 tablet 5    levothyroxine (SYNTHROID) 125 MCG tablet Take 1 tablet by mouth Daily (Patient taking differently: Take 125 mcg by mouth nightly ) 30 tablet 0    gabapentin (NEURONTIN) 800 MG tablet Take 800 mg by mouth 3 times daily      PARoxetine (PAXIL) 30 MG tablet Take 40 mg by mouth nightly          Allergies:   Allergies   Allergen Reactions    Aspirin Palpitations     \"My Heart Rate Elevates\"    Compazine [Prochlorperazine Maleate] Rash    Reglan [Metoclopramide Hcl] Rash    Shellfish-Derived Products Swelling    Toradol [Ketorolac Tromethamine] Rash        Social History:   reports that she has never smoked. She has never used smokeless tobacco. She reports that she does not drink alcohol or use drugs. Family History:  family history includes Arthritis in her mother; Asthma in her father and son; Cancer in her father; Diabetes in her father and mother; Heart Disease in her mother; High Blood Pressure in her brother, father, and mother; High Cholesterol in her mother; Lupus in her daughter; Obesity in her mother; Other in her daughter; Vision Loss in her mother and son. ,     Physical Exam:  /76   Pulse 64   Temp 98.1 °F (36.7 °C) (Oral)   Resp 15   Ht 5' 4\" (1.626 m)   Wt 260 lb (117.9 kg)   SpO2 99%   BMI 44.63 kg/m²     General appearance:  Appears comfortable. Well nourished  Eyes: Sclera clear, pupils equal  Cardiovascular: Regular rhythm, normal S1, S2. No murmur. No edema in lower extremities  Respiratory: Clear to auscultation bilaterally, no wheeze, good inspiratory effort  Gastrointestinal: Abdomen soft, tender, not distended, normal bowel sounds  Musculoskeletal: No cyanosis in digits, neck supple  Neurology: Cranial nerves grossly intact. Alert and oriented in time, place and person. double vision . Psychiatry: Appropriate affect.  Not agitated  Skin: Warm, dry, normal turgor, no rash    Labs:  CBC:   Lab Results   Component Value Date    WBC 5.0 01/09/2020    RBC 4.41 01/09/2020    HGB 11.6 01/09/2020    HCT 37.8 01/09/2020    MCV 85.7 01/09/2020    MCH 26.3 01/09/2020    MCHC 30.7 01/09/2020    RDW 14.1 01/09/2020     01/09/2020    MPV 10.2 01/09/2020     BMP:    Lab Results   Component Value Date     01/09/2020    K 5.0 01/09/2020     01/09/2020    CO2 19 01/09/2020    BUN 15 01/09/2020    CREATININE 0.6 quadrant R10.11    Sepsis due to Pseudomonas species ([facility] [address] A41.52    Bacteremia due to Pseudomonas R78.81    Catheter-related bloodstream infection T80.211A    Hematemesis with nausea K92.0    Hematemesis K92.0    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

## 2020-01-09 NOTE — ED NOTES
The power port has been accessed and flushes easily. After multiple attempts and positions, of flushing and aspirating, this nurse is unable to obtain blood for labs. The patient sts sometimes it does take multiple tries.       Narendra Armendariz RN  01/09/20 4929

## 2020-01-09 NOTE — ED PROVIDER NOTES
As PA-in-triage, I performed a medical screening history and physical exam on this patient. HISTORY OF PRESENT ILLNESS  Andre Pope is a 44 y.o. female Renee Randolph for multiple complaints including abdominal pain, dizziness, blurred vision and generalized headache. Onset is acute on chronic. Patient states that she has a history of pancreatitis and epigastric abdominal pain radiating to the back feels similar with associated nausea vomiting and diarrhea. No hematemesis or coffee-ground emesis. Also reporting dizziness with position changes and blurred vision which she has had in the past secondary to anemia that has required blood transfusions. No black tarry stools. No fever or chills. Denying any chest pain or shortness of breath. .      PHYSICAL EXAM  BP (!) 136/49   Pulse 62   Temp 98.1 °F (36.7 °C) (Oral)   Resp 18   Ht 5' 4\" (1.626 m)   Wt 260 lb (117.9 kg)   SpO2 96%   BMI 44.63 kg/m²     On exam, the patient appears well-hydrated, well-nourished, and in no acute distress. Mucous membranes are moist. Speech is clear. Breathing is unlabored. Skin is dry. Mental status is normal. The patient has normal gait, moves all extremities, and is without facial droop. Labs ordered at triage. Please see ED provider's note for patient complete ED evaluation, labs/imaging interpretation and final disposition/clinical impression.          Afia Alexandre PA-C  01/09/20 3736

## 2020-01-09 NOTE — ED NOTES
Patient was stuck for blood 11 times by this RN. Pt states that she is a difficult stick and typically takes multiple sticks to obtain blood. Attempted to draw from port, no blood return x3 times.       Meron Gil RN  01/09/20 5343

## 2020-01-10 LAB
ANION GAP SERPL CALCULATED.3IONS-SCNC: 9 MMOL/L (ref 4–16)
APTT: 32.5 SECONDS (ref 25.1–37.1)
BUN BLDV-MCNC: 14 MG/DL (ref 6–23)
CALCIUM SERPL-MCNC: 10.2 MG/DL (ref 8.3–10.6)
CHLORIDE BLD-SCNC: 104 MMOL/L (ref 99–110)
CO2: 24 MMOL/L (ref 21–32)
CREAT SERPL-MCNC: 0.7 MG/DL (ref 0.6–1.1)
GFR AFRICAN AMERICAN: >60 ML/MIN/1.73M2
GFR NON-AFRICAN AMERICAN: >60 ML/MIN/1.73M2
GLUCOSE BLD-MCNC: 98 MG/DL (ref 70–99)
HCT VFR BLD CALC: 36.3 % (ref 37–47)
HEMOGLOBIN: 11.2 GM/DL (ref 12.5–16)
INR BLD: 0.93 INDEX
MCH RBC QN AUTO: 26.4 PG (ref 27–31)
MCHC RBC AUTO-ENTMCNC: 30.9 % (ref 32–36)
MCV RBC AUTO: 85.4 FL (ref 78–100)
PDW BLD-RTO: 14.2 % (ref 11.7–14.9)
PLATELET # BLD: 177 K/CU MM (ref 140–440)
PMV BLD AUTO: 9.9 FL (ref 7.5–11.1)
POTASSIUM SERPL-SCNC: 4.4 MMOL/L (ref 3.5–5.1)
PROTHROMBIN TIME: 11.2 SECONDS (ref 11.7–14.5)
RBC # BLD: 4.25 M/CU MM (ref 4.2–5.4)
SODIUM BLD-SCNC: 137 MMOL/L (ref 135–145)
WBC # BLD: 3.9 K/CU MM (ref 4–10.5)

## 2020-01-10 PROCEDURE — 36415 COLL VENOUS BLD VENIPUNCTURE: CPT

## 2020-01-10 PROCEDURE — 96372 THER/PROPH/DIAG INJ SC/IM: CPT

## 2020-01-10 PROCEDURE — 93010 ELECTROCARDIOGRAM REPORT: CPT | Performed by: INTERNAL MEDICINE

## 2020-01-10 PROCEDURE — G0378 HOSPITAL OBSERVATION PER HR: HCPCS

## 2020-01-10 PROCEDURE — 85027 COMPLETE CBC AUTOMATED: CPT

## 2020-01-10 PROCEDURE — 96376 TX/PRO/DX INJ SAME DRUG ADON: CPT

## 2020-01-10 PROCEDURE — 80048 BASIC METABOLIC PNL TOTAL CA: CPT

## 2020-01-10 PROCEDURE — 6370000000 HC RX 637 (ALT 250 FOR IP): Performed by: FAMILY MEDICINE

## 2020-01-10 PROCEDURE — 87040 BLOOD CULTURE FOR BACTERIA: CPT

## 2020-01-10 PROCEDURE — 85610 PROTHROMBIN TIME: CPT

## 2020-01-10 PROCEDURE — 85730 THROMBOPLASTIN TIME PARTIAL: CPT

## 2020-01-10 PROCEDURE — 94761 N-INVAS EAR/PLS OXIMETRY MLT: CPT

## 2020-01-10 PROCEDURE — 6360000002 HC RX W HCPCS: Performed by: FAMILY MEDICINE

## 2020-01-10 RX ORDER — MECLIZINE HYDROCHLORIDE 25 MG/1
25 TABLET ORAL 3 TIMES DAILY
Status: DISCONTINUED | OUTPATIENT
Start: 2020-01-10 | End: 2020-01-23 | Stop reason: HOSPADM

## 2020-01-10 RX ADMIN — OXYCODONE HYDROCHLORIDE AND ACETAMINOPHEN 1 TABLET: 5; 325 TABLET ORAL at 00:14

## 2020-01-10 RX ADMIN — TIZANIDINE 4 MG: 4 TABLET ORAL at 08:44

## 2020-01-10 RX ADMIN — LEVETIRACETAM 1000 MG: 500 TABLET, FILM COATED ORAL at 08:44

## 2020-01-10 RX ADMIN — MECLIZINE HYDROCHLORIDE 25 MG: 25 TABLET ORAL at 20:53

## 2020-01-10 RX ADMIN — LACOSAMIDE 50 MG: 50 TABLET, FILM COATED ORAL at 08:44

## 2020-01-10 RX ADMIN — MULTIPLE VITAMINS W/ MINERALS TAB 1 TABLET: TAB at 08:44

## 2020-01-10 RX ADMIN — LEVETIRACETAM 1000 MG: 500 TABLET, FILM COATED ORAL at 20:53

## 2020-01-10 RX ADMIN — DICYCLOMINE HYDROCHLORIDE 20 MG: 20 TABLET ORAL at 20:53

## 2020-01-10 RX ADMIN — OXYCODONE HYDROCHLORIDE AND ACETAMINOPHEN 1 TABLET: 5; 325 TABLET ORAL at 16:57

## 2020-01-10 RX ADMIN — PANTOPRAZOLE SODIUM 40 MG: 40 TABLET, DELAYED RELEASE ORAL at 05:59

## 2020-01-10 RX ADMIN — MECLIZINE HYDROCHLORIDE 25 MG: 25 TABLET ORAL at 16:54

## 2020-01-10 RX ADMIN — ATENOLOL 25 MG: 25 TABLET ORAL at 08:44

## 2020-01-10 RX ADMIN — FERROUS SULFATE TAB 325 MG (65 MG ELEMENTAL FE) 325 MG: 325 (65 FE) TAB at 08:44

## 2020-01-10 RX ADMIN — PROMETHAZINE HYDROCHLORIDE 25 MG: 25 TABLET ORAL at 20:53

## 2020-01-10 RX ADMIN — LEVOTHYROXINE SODIUM 125 MCG: 125 TABLET ORAL at 05:59

## 2020-01-10 RX ADMIN — GABAPENTIN 800 MG: 400 CAPSULE ORAL at 20:53

## 2020-01-10 RX ADMIN — DICYCLOMINE HYDROCHLORIDE 20 MG: 20 TABLET ORAL at 08:44

## 2020-01-10 RX ADMIN — MORPHINE SULFATE 2 MG: 2 INJECTION, SOLUTION INTRAMUSCULAR; INTRAVENOUS at 04:16

## 2020-01-10 RX ADMIN — ONDANSETRON 4 MG: 2 INJECTION INTRAMUSCULAR; INTRAVENOUS at 04:08

## 2020-01-10 RX ADMIN — MORPHINE SULFATE 2 MG: 2 INJECTION, SOLUTION INTRAMUSCULAR; INTRAVENOUS at 20:52

## 2020-01-10 RX ADMIN — GABAPENTIN 800 MG: 400 CAPSULE ORAL at 08:44

## 2020-01-10 RX ADMIN — GABAPENTIN 800 MG: 400 CAPSULE ORAL at 12:57

## 2020-01-10 RX ADMIN — LACOSAMIDE 50 MG: 50 TABLET, FILM COATED ORAL at 20:53

## 2020-01-10 RX ADMIN — Medication 5000 UNITS: at 08:44

## 2020-01-10 RX ADMIN — ENOXAPARIN SODIUM 40 MG: 40 INJECTION SUBCUTANEOUS at 08:44

## 2020-01-10 RX ADMIN — PAROXETINE HYDROCHLORIDE 40 MG: 20 TABLET, FILM COATED ORAL at 20:53

## 2020-01-10 RX ADMIN — ONDANSETRON 4 MG: 2 INJECTION INTRAMUSCULAR; INTRAVENOUS at 12:57

## 2020-01-10 RX ADMIN — MORPHINE SULFATE 2 MG: 2 INJECTION, SOLUTION INTRAMUSCULAR; INTRAVENOUS at 14:14

## 2020-01-10 RX ADMIN — LORAZEPAM 0.5 MG: 0.5 TABLET ORAL at 16:57

## 2020-01-10 RX ADMIN — OYSTER SHELL CALCIUM WITH VITAMIN D 1 TABLET: 500; 200 TABLET, FILM COATED ORAL at 16:54

## 2020-01-10 RX ADMIN — PROMETHAZINE HYDROCHLORIDE 25 MG: 25 TABLET ORAL at 05:59

## 2020-01-10 RX ADMIN — DICYCLOMINE HYDROCHLORIDE 20 MG: 20 TABLET ORAL at 12:57

## 2020-01-10 RX ADMIN — TIZANIDINE 4 MG: 4 TABLET ORAL at 20:53

## 2020-01-10 RX ADMIN — MORPHINE SULFATE 2 MG: 2 INJECTION, SOLUTION INTRAMUSCULAR; INTRAVENOUS at 09:49

## 2020-01-10 ASSESSMENT — PAIN SCALES - GENERAL
PAINLEVEL_OUTOF10: 8
PAINLEVEL_OUTOF10: 8
PAINLEVEL_OUTOF10: 0
PAINLEVEL_OUTOF10: 8
PAINLEVEL_OUTOF10: 8
PAINLEVEL_OUTOF10: 7
PAINLEVEL_OUTOF10: 0
PAINLEVEL_OUTOF10: 0
PAINLEVEL_OUTOF10: 8
PAINLEVEL_OUTOF10: 7

## 2020-01-10 NOTE — PROGRESS NOTES
PS Conversation with Dr. Leonard Manriquez -     1/9/20 9:02 PM This Nurse:  Pt's medi-port will not draw for labs. 3 different nurses have tried. Pt has been stuck peripherally x14 with only a few samples obtained. Still need to draw 1 tube and blood cultures. Could we have an order for heparin flush for her medi-port?    1/9/20 9:03 PM Dr. Leonard Manriquez:  Render Willem ahead please     1/9/20 9:03 PM This Nurse: Thanks! 1/9/20 9:15 PM This Nurse:  Would you be able to put that in? There are few to choose from, and I'm being told by my charge that we cannot even do the heparin flushes on this floor, but we have an ICU nurse coming up to do it. But unsure of the order to put in, if you could have a look when you get a chance. Thanks! 1/9/20 9:16 PM Dr. Leonard Manriquez:  No access now to computer. .. ask the icu nurse which one they use !! Thanks     1/9/20 9:25 PM This Nurse:  It's been confirmed that it's the Heparin 100units / ml. x1 PRN - can I have the go ahead to place this order?     1/9/20 9:25 PM Dr. Leonard Manriquez:  Yes Thanks

## 2020-01-11 LAB
HCT VFR BLD CALC: 35.4 % (ref 37–47)
HEMOGLOBIN: 10.9 GM/DL (ref 12.5–16)
MCH RBC QN AUTO: 26.3 PG (ref 27–31)
MCHC RBC AUTO-ENTMCNC: 30.8 % (ref 32–36)
MCV RBC AUTO: 85.3 FL (ref 78–100)
PDW BLD-RTO: 14.3 % (ref 11.7–14.9)
PLATELET # BLD: 181 K/CU MM (ref 140–440)
PMV BLD AUTO: 10.2 FL (ref 7.5–11.1)
RBC # BLD: 4.15 M/CU MM (ref 4.2–5.4)
WBC # BLD: 4 K/CU MM (ref 4–10.5)

## 2020-01-11 PROCEDURE — 85027 COMPLETE CBC AUTOMATED: CPT

## 2020-01-11 PROCEDURE — 2580000003 HC RX 258: Performed by: NURSE PRACTITIONER

## 2020-01-11 PROCEDURE — 6370000000 HC RX 637 (ALT 250 FOR IP): Performed by: FAMILY MEDICINE

## 2020-01-11 PROCEDURE — 6360000002 HC RX W HCPCS: Performed by: NURSE PRACTITIONER

## 2020-01-11 PROCEDURE — 36415 COLL VENOUS BLD VENIPUNCTURE: CPT

## 2020-01-11 PROCEDURE — 99205 OFFICE O/P NEW HI 60 MIN: CPT | Performed by: PSYCHIATRY & NEUROLOGY

## 2020-01-11 PROCEDURE — 96376 TX/PRO/DX INJ SAME DRUG ADON: CPT

## 2020-01-11 PROCEDURE — 6360000002 HC RX W HCPCS: Performed by: FAMILY MEDICINE

## 2020-01-11 PROCEDURE — G0378 HOSPITAL OBSERVATION PER HR: HCPCS

## 2020-01-11 PROCEDURE — 2500000003 HC RX 250 WO HCPCS: Performed by: NURSE PRACTITIONER

## 2020-01-11 PROCEDURE — 96372 THER/PROPH/DIAG INJ SC/IM: CPT

## 2020-01-11 PROCEDURE — 96375 TX/PRO/DX INJ NEW DRUG ADDON: CPT

## 2020-01-11 RX ORDER — DIPHENHYDRAMINE HYDROCHLORIDE 50 MG/ML
50 INJECTION INTRAMUSCULAR; INTRAVENOUS EVERY 6 HOURS PRN
Status: DISCONTINUED | OUTPATIENT
Start: 2020-01-11 | End: 2020-01-23 | Stop reason: HOSPADM

## 2020-01-11 RX ORDER — DEXAMETHASONE SODIUM PHOSPHATE 4 MG/ML
10 INJECTION, SOLUTION INTRA-ARTICULAR; INTRALESIONAL; INTRAMUSCULAR; INTRAVENOUS; SOFT TISSUE ONCE
Status: COMPLETED | OUTPATIENT
Start: 2020-01-11 | End: 2020-01-11

## 2020-01-11 RX ORDER — ORPHENADRINE CITRATE 30 MG/ML
60 INJECTION INTRAMUSCULAR; INTRAVENOUS ONCE
Status: COMPLETED | OUTPATIENT
Start: 2020-01-11 | End: 2020-01-11

## 2020-01-11 RX ORDER — LEVETIRACETAM 500 MG/1
1000 TABLET ORAL 2 TIMES DAILY
Status: DISCONTINUED | OUTPATIENT
Start: 2020-01-11 | End: 2020-01-11

## 2020-01-11 RX ADMIN — TIZANIDINE 4 MG: 4 TABLET ORAL at 09:03

## 2020-01-11 RX ADMIN — Medication 5000 UNITS: at 09:02

## 2020-01-11 RX ADMIN — OYSTER SHELL CALCIUM WITH VITAMIN D 1 TABLET: 500; 200 TABLET, FILM COATED ORAL at 16:51

## 2020-01-11 RX ADMIN — MECLIZINE HYDROCHLORIDE 25 MG: 25 TABLET ORAL at 21:01

## 2020-01-11 RX ADMIN — MECLIZINE HYDROCHLORIDE 25 MG: 25 TABLET ORAL at 09:04

## 2020-01-11 RX ADMIN — TIZANIDINE 4 MG: 4 TABLET ORAL at 21:01

## 2020-01-11 RX ADMIN — ONDANSETRON 4 MG: 2 INJECTION INTRAMUSCULAR; INTRAVENOUS at 16:51

## 2020-01-11 RX ADMIN — LORAZEPAM 0.5 MG: 0.5 TABLET ORAL at 09:03

## 2020-01-11 RX ADMIN — DICYCLOMINE HYDROCHLORIDE 20 MG: 20 TABLET ORAL at 13:13

## 2020-01-11 RX ADMIN — FERROUS SULFATE TAB 325 MG (65 MG ELEMENTAL FE) 325 MG: 325 (65 FE) TAB at 21:01

## 2020-01-11 RX ADMIN — ONDANSETRON 4 MG: 2 INJECTION INTRAMUSCULAR; INTRAVENOUS at 00:56

## 2020-01-11 RX ADMIN — MORPHINE SULFATE 2 MG: 2 INJECTION, SOLUTION INTRAMUSCULAR; INTRAVENOUS at 05:55

## 2020-01-11 RX ADMIN — GABAPENTIN 800 MG: 400 CAPSULE ORAL at 13:13

## 2020-01-11 RX ADMIN — VALPROATE SODIUM 500 MG: 100 INJECTION, SOLUTION INTRAVENOUS at 16:51

## 2020-01-11 RX ADMIN — MECLIZINE HYDROCHLORIDE 25 MG: 25 TABLET ORAL at 13:13

## 2020-01-11 RX ADMIN — PANTOPRAZOLE SODIUM 40 MG: 40 TABLET, DELAYED RELEASE ORAL at 05:55

## 2020-01-11 RX ADMIN — LEVETIRACETAM 1000 MG: 500 TABLET, FILM COATED ORAL at 09:02

## 2020-01-11 RX ADMIN — ONDANSETRON 4 MG: 2 INJECTION INTRAMUSCULAR; INTRAVENOUS at 09:03

## 2020-01-11 RX ADMIN — MORPHINE SULFATE 2 MG: 2 INJECTION, SOLUTION INTRAMUSCULAR; INTRAVENOUS at 11:32

## 2020-01-11 RX ADMIN — FERROUS SULFATE TAB 325 MG (65 MG ELEMENTAL FE) 325 MG: 325 (65 FE) TAB at 09:03

## 2020-01-11 RX ADMIN — MORPHINE SULFATE 2 MG: 2 INJECTION, SOLUTION INTRAMUSCULAR; INTRAVENOUS at 00:56

## 2020-01-11 RX ADMIN — LACOSAMIDE 50 MG: 50 TABLET, FILM COATED ORAL at 09:03

## 2020-01-11 RX ADMIN — ENOXAPARIN SODIUM 40 MG: 40 INJECTION SUBCUTANEOUS at 09:02

## 2020-01-11 RX ADMIN — VALPROATE SODIUM 500 MG: 100 INJECTION, SOLUTION INTRAVENOUS at 11:32

## 2020-01-11 RX ADMIN — DEXAMETHASONE SODIUM PHOSPHATE 10 MG: 4 INJECTION, SOLUTION INTRAMUSCULAR; INTRAVENOUS at 11:32

## 2020-01-11 RX ADMIN — MORPHINE SULFATE 2 MG: 2 INJECTION, SOLUTION INTRAMUSCULAR; INTRAVENOUS at 21:01

## 2020-01-11 RX ADMIN — DICYCLOMINE HYDROCHLORIDE 20 MG: 20 TABLET ORAL at 03:13

## 2020-01-11 RX ADMIN — ORPHENADRINE CITRATE 60 MG: 30 INJECTION INTRAMUSCULAR; INTRAVENOUS at 11:32

## 2020-01-11 RX ADMIN — DICYCLOMINE HYDROCHLORIDE 20 MG: 20 TABLET ORAL at 09:02

## 2020-01-11 RX ADMIN — MULTIPLE VITAMINS W/ MINERALS TAB 1 TABLET: TAB at 09:03

## 2020-01-11 RX ADMIN — PAROXETINE HYDROCHLORIDE 40 MG: 20 TABLET, FILM COATED ORAL at 21:01

## 2020-01-11 RX ADMIN — ATENOLOL 25 MG: 25 TABLET ORAL at 09:03

## 2020-01-11 RX ADMIN — MORPHINE SULFATE 2 MG: 2 INJECTION, SOLUTION INTRAMUSCULAR; INTRAVENOUS at 16:51

## 2020-01-11 RX ADMIN — GABAPENTIN 800 MG: 400 CAPSULE ORAL at 21:01

## 2020-01-11 RX ADMIN — DICYCLOMINE HYDROCHLORIDE 20 MG: 20 TABLET ORAL at 21:01

## 2020-01-11 RX ADMIN — PROMETHAZINE HYDROCHLORIDE 25 MG: 25 TABLET ORAL at 03:13

## 2020-01-11 RX ADMIN — LEVOTHYROXINE SODIUM 125 MCG: 125 TABLET ORAL at 05:55

## 2020-01-11 RX ADMIN — OYSTER SHELL CALCIUM WITH VITAMIN D 1 TABLET: 500; 200 TABLET, FILM COATED ORAL at 09:02

## 2020-01-11 RX ADMIN — GABAPENTIN 800 MG: 400 CAPSULE ORAL at 09:03

## 2020-01-11 ASSESSMENT — PAIN SCALES - GENERAL
PAINLEVEL_OUTOF10: 9
PAINLEVEL_OUTOF10: 0
PAINLEVEL_OUTOF10: 9
PAINLEVEL_OUTOF10: 8
PAINLEVEL_OUTOF10: 4
PAINLEVEL_OUTOF10: 7
PAINLEVEL_OUTOF10: 7
PAINLEVEL_OUTOF10: 0
PAINLEVEL_OUTOF10: 8
PAINLEVEL_OUTOF10: 8

## 2020-01-11 NOTE — CONSULTS
reports that when she has headache she feels as if she has room spinning dizziness. Patient also appreciates a decrease in balance, she has to hold onto things when walking around the house and cannot  the shower. She has never had an EMU stay, she is never had an EEG, and EEG was ordered in 2013 but then discontinued, and I do not see in care everywhere where she has had a EEG at any other facility. Patient reports that she has a 6 out of 10 right unilateral headache during my exam, no photosensitivity or nausea for me no chest pain or shortness of breath, no unilateral arm or leg weakness aphasia dysarthria or facial droop. UPDATE:  Patient tells Dr. Donita Muñoz that her PCP stopped her AED and she has not taken them for 2 months. Past Medical History:   Diagnosis Date    Acid reflux     Anemia     Anxiety     Arthritis     Hands, Back And Ankles    Bleeding ulcer 2014    \"I Had Ulcers In My Stomach And Colon\"/ per pt on 8/12/2019\"they said recently having some blood in my stomach- in July ( 2019)could not find where coming from \"    Bronchitis Last Episode 2014    Chronic back pain     Chronic pain     Sees Dr. Dhaval Schmidt COPD (chronic obstructive pulmonary disease) ([facility] [address]     Sees Dr. Mireya Nieves Depression     Fibromyalgia Dx 2013    H/O echocardiogram 8/11/15    EF >55%. LA to be at the upper limit of normal in size. LV hypertrophy with normal LV systolic, but abnormal diastolic function. Normal valvular structures and function.  H/O echocardiogram 08/30/2018    EF 55-60%    Hiatal hernia     History of blood transfusion 09/2015 And 2018    No Reaction To Blood Transfusions Received    [place] (hard of hearing)     Right Ear    Hx of cardiac catheterization 10/24/2010    Angiographically normal coronary arteries w/ normal LV function and wall motion.  Hx of transesophageal echocardiography (PILO) for monitoring 12/2/2010    EF 55-60%. WNL.     HX / REPLACEMENT VENOUS ACCESS CATHETER N/A 8/15/2019    PORT INSERTION performed by Tiarra Godinez MD at [address] [facility]    removed after 6 months    LUNG REMOVAL, PARTIAL Left 2008    Benign    OTHER SURGICAL HISTORY  02/1998    \"Tubes And Ovaries Removed, Appendectomy Also Done\"    OTHER SURGICAL HISTORY  Last Done 7-15-16    Mediport Insertion \"Total Of Six Done, Removed Last Mediport In 2014\"    SLEEVE GASTRECTOMY N/A 2/12/2019    GASTRECTOMY SLEEVE LAPAROSCOPIC ROBOTIC performed by Tiarra Godinez MD at [address] [place] [address]  08/27/2018    UPPER GASTROINTESTINAL ENDOSCOPY N/A 4/2/2019    EGD CONTROL HEMORRHAGE with epi injection at bleeding site performed by Tiarra Godinez MD at [place] [address] N/A 6/19/2019    EGD DIAGNOSTIC ONLY performed by Tiarra Godinez MD at [place] [address] N/A 7/22/2019    EGD DIAGNOSTIC ONLY performed by Justine Goodman MD at [place] [address] N/A 10/14/2019    EGD DIAGNOSTIC ONLY performed by Tiarra Godinez MD at [facility] ENDOSCOPY     Medications:  Scheduled Meds:   meclizine  25 mg Oral TID    PARoxetine  40 mg Oral Nightly    gabapentin  800 mg Oral TID    levothyroxine  125 mcg Oral Daily    therapeutic multivitamin-minerals  1 tablet Oral Daily    dicyclomine  20 mg Oral Q6H    atenolol  25 mg Oral Daily    tiZANidine  4 mg Oral BID    ferrous sulfate  325 mg Oral BID    vitamin D  5,000 Units Oral Daily    levETIRAcetam  1,000 mg Oral BID    lacosamide  50 mg Oral BID    pantoprazole  40 mg Oral QAM AC    sodium chloride flush  10 mL Intravenous 2 times per day    enoxaparin  40 mg Subcutaneous Daily    calcium-vitamin D  1 tablet Oral BID WC     Continuous Infusions:   dextrose 5% and 0.45% NaCl with KCl 20 mEq 50 mL/hr at 01/09/20 2036     PRN Meds:.ondansetron, oxyCODONE-acetaminophen, promethazine, sodium chloride flush, magnesium hydroxide, ondansetron, morphine, LORazepam    Allergies   Allergen Reactions    Aspirin Palpitations     \"My Heart Rate Elevates\"    Compazine [Prochlorperazine Maleate] Rash    Reglan [Metoclopramide Hcl] Rash    Shellfish-Derived Products Swelling    Toradol [Ketorolac Tromethamine] Rash     Social History     Socioeconomic History    Marital status:      Spouse name: Not on file    Number of children: Not on file    Years of education: Not on file    Highest education level: Not on file   Occupational History    Not on file   Social Needs    Financial resource strain: Not on file    Food insecurity:     Worry: Not on file     Inability: Not on file    Transportation needs:     Medical: Not on file     Non-medical: Not on file   Tobacco Use    Smoking status: Never Smoker    Smokeless tobacco: Never Used   Substance and Sexual Activity    Alcohol use: No     Alcohol/week: 0.0 standard drinks    Drug use: No    Sexual activity: Not Currently   Lifestyle    Physical activity:     Days per week: Not on file     Minutes per session: Not on file    Stress: Not on file   Relationships    Social connections:     Talks on phone: Not on file     Gets together: Not on file     Attends Congregation service: Not on file     Active member of club or organization: Not on file     Attends meetings of clubs or organizations: Not on file     Relationship status: Not on file    Intimate partner violence:     Fear of current or ex partner: Not on file     Emotionally abused: Not on file     Physically abused: Not on file     Forced sexual activity: Not on file   Other Topics Concern    Not on file   Social History Narrative    Not on file      Family History   Problem Relation Age of Onset    Diabetes Mother         \"Borderline Diabetes\"    High Blood Pressure Mother     Obesity Mother     Arthritis Mother     Heart Disease Mother     High Cholesterol Mother     Vision Loss Mother     Diabetes Father     High Blood Pressure Father     Asthma Father     Cancer Father         prostate cancer    High Blood Pressure Brother     Asthma Son     Vision Loss Son     Lupus Daughter     Other Daughter         \"Alot Of Female Problems\"       Review of Symptoms:    10-point system review completed. All of which are negative except as mentioned above. Physical Exam:        Gen: A&O x 4, NAD, cooperative, obese   HEENT: NC/AT, EOMI, PERRL, mmm, neck supple, no meningeal signs; Heart: Regular   Lungs: no distress  Ext: no edema, no calf tenderness b/l  Psych: normal mood and affect  Skin: no rashes or lesions    NEUROLOGIC EXAM:    Mental Status: A&O to self, location, month and year, NAD, speech clear, language fluent, repetition and naming intact, follows commands appropriately    Cranial Nerve Exam:   CN II-XII: , PERRL, VFF, no nystagmus, no gaze paresis, sensation V1-V3 intact b/l, muscles of facial expression symmetric; hearing intact to conversational tone, palate elevates symmetrically, shoulder elevation symmetric and tongue protrudes midline with movement side to side.     Motor Exam:       Antigravity x4    Deep Tendon Reflexes: trace/4 biceps, triceps, brachioradialis, patellar, and achilles b/l; flexor plantar responses b/l    Sensation: Intact light touch/temp UE's/LE's b/l (temp decreased in stocking distribution ble)    Coordination/Cerebellum:       Tremors--none      Rapidly alternating movements: no dysdiadochokinesia b/l                Finger-to-Nose: no dysmetria b/l    Gait and stance:      Gait: deferred      LABS:     Recent Labs     01/09/20  1625 01/09/20  1655 01/10/20  0559 01/10/20  0600 01/11/20  0537   WBC 5.0  --   --  3.9* 4.0     --   --  137  --    K 5.0  --   --  4.4  --      --   --  104  --    CO2 19*  --   --  24  --    BUN 15  --   --  14  --    CREATININE 0.6  --   --  0.7  -- GLUCOSE 103*  --   --  98  --    INR  --  ? 0.93  --   --          IMAGING:    MRA:  unnremarkable noncontrast MR angiogram of the head. MRI brain:  Unremarkable noncontrast MRI brain examination. ASSESSMENT/PLAN:     3 44year old female with right unilateral cephalgia consistent with migraine superimposed on chronic visual changes and seizure v. Pseudoseizure. Plan of care as follows:  1. Neuro Exam:  1. Non focal  2. Neurodiagnostics:  1. MRI brain non acute  2. MRA brain non acute  3. Medications:  1. D/C Vimpat  2. D/C Keppra   3. Decadron, Depacon, Benadryl and Norflex for acute headache , dosages per eMAR  4. PT/OT/ST:  1. Per their recommendations  5. Follow up:  1. She needs a seizure monitoring unit outpatient, recommend [facility], number provided, once headache tolerable she is stable for discharge. Thank you for allowing us to participate in the care of your patient. If there are any questions regarding evaluation please feel free to contact us. MICHAEL Martines CNP, 2020       ------------------------------------    Attending Note:  I have rounded on this patient with Socorro Lemus CNP. I have reviewed the chart and we have discussed this case in detail. The patient was seen and examined by myself. Pertinent labs and imaging have been personally reviewed. Our findings and impressions were discussed with the patient. I concur with the Nurse Practioner's assessment and plan. The patient was taken off seizure medicines by her primary doctor due to possible side effects. She has not had any breakthrough seizures. We will treat her headache as detailed above.     Freddie Perkins DO 2020 10:53 AM

## 2020-01-11 NOTE — PROGRESS NOTES
Attending Progress Note      PCP: Nader Mcmillan MD    Patient: Rogerio Claros   Gender: female  : 1970   Age: 44 y.o. MRN: [id number]      Date of Admission: 2020    Chief Complaint:   Chief Complaint   Patient presents with    Dizziness     onset 3-4 days ago. reports dizziness and blurry vision    Abdominal Pain     hx of pancreatitis, reports right upper abd pain radiating around to her back with N/V/D           Subjective: asked to advance her diet . .. n/v controlled . . still has dizziness and double vision   Pain free   No fever/chills , no N/V/diarrhea     Medications:  Reviewed  Infusion Medications    dextrose 5% and 0.45% NaCl with KCl 20 mEq 50 mL/hr at 20     Scheduled Medications    meclizine  25 mg Oral TID    PARoxetine  40 mg Oral Nightly    gabapentin  800 mg Oral TID    levothyroxine  125 mcg Oral Daily    therapeutic multivitamin-minerals  1 tablet Oral Daily    dicyclomine  20 mg Oral Q6H    atenolol  25 mg Oral Daily    tiZANidine  4 mg Oral BID    ferrous sulfate  325 mg Oral BID    vitamin D  5,000 Units Oral Daily    levETIRAcetam  1,000 mg Oral BID    lacosamide  50 mg Oral BID    pantoprazole  40 mg Oral QAM AC    sodium chloride flush  10 mL Intravenous 2 times per day    enoxaparin  40 mg Subcutaneous Daily    calcium-vitamin D  1 tablet Oral BID WC     PRN Meds: ondansetron, oxyCODONE-acetaminophen, promethazine, sodium chloride flush, magnesium hydroxide, ondansetron, morphine, LORazepam    No intake or output data in the 24 hours ending 20 0320    Exam:  BP 92/82   Pulse 64   Temp 98 °F (36.7 °C) (Oral)   Resp 19   Ht 5' 4\" (1.626 m)   Wt 274 lb (124.3 kg)   SpO2 98%   BMI 47.03 kg/m²   General appearance: No distress,   Respiratory:  symmetrical , good air entry , no Rales , No wheezing, or rhonchi,  Cardiovascular: RRR, with normal S1/S2 without murmurs.   Abdomen : Soft, tender, non-distended  , normal bowel sounds. Legs : No edema bilaterally. No DVT signs ,    Neurologic:  Alert and oriented , no focal sensory/motor deficits , grossly non-focal.        Labs:   Recent Labs     01/09/20  1625 01/10/20  0600   WBC 5.0 3.9*   HGB 11.6* 11.2*   HCT 37.8 36.3*    177     Recent Labs     01/09/20  1625 01/10/20  0600    137   K 5.0 4.4    104   CO2 19* 24   BUN 15 14   CREATININE 0.6 0.7   CALCIUM 9.8 10.2     Recent Labs     01/09/20  1625   AST 28   ALT 20   BILITOT 0.3   ALKPHOS 121     Recent Labs     01/09/20  1655 01/10/20  0559   INR ? 0.93     No results for input(s): Gris Parra in the last 72 hours. Assessment/Plan:    Active Hospital Problems    Diagnosis Date Noted    Dizziness [R42] 01/09/2020    Generalized abdominal pain [R10.84] 09/26/2015    Nausea & vomiting [R11.2] 04/19/2013       Tele   IVF , advance  diet to regular   Symptomatic treatment Zofran, Phenergan PRN. ...h/h stable . Pain control Morphine, percocet   Blood culture :pending   Brain MRI/MRA : both with no concern . .pt still dizzy with double vision . ... Consider neuro consult  Antivert TID   Home meds   DVT proph     Diet: DIET GENERAL;  Code Status: Full Code    Treatment progress and plan was d/w pt/family .         Josiah Tapia MD

## 2020-01-12 LAB
HCT VFR BLD CALC: 38.2 % (ref 37–47)
HEMOGLOBIN: 11.9 GM/DL (ref 12.5–16)
MCH RBC QN AUTO: 26.6 PG (ref 27–31)
MCHC RBC AUTO-ENTMCNC: 31.2 % (ref 32–36)
MCV RBC AUTO: 85.3 FL (ref 78–100)
PDW BLD-RTO: 13.8 % (ref 11.7–14.9)
PLATELET # BLD: 193 K/CU MM (ref 140–440)
PMV BLD AUTO: 9.8 FL (ref 7.5–11.1)
RBC # BLD: 4.48 M/CU MM (ref 4.2–5.4)
WBC # BLD: 8.4 K/CU MM (ref 4–10.5)

## 2020-01-12 PROCEDURE — 2580000003 HC RX 258: Performed by: FAMILY MEDICINE

## 2020-01-12 PROCEDURE — 99215 OFFICE O/P EST HI 40 MIN: CPT | Performed by: NURSE PRACTITIONER

## 2020-01-12 PROCEDURE — G0378 HOSPITAL OBSERVATION PER HR: HCPCS

## 2020-01-12 PROCEDURE — 2500000003 HC RX 250 WO HCPCS: Performed by: NURSE PRACTITIONER

## 2020-01-12 PROCEDURE — 6360000002 HC RX W HCPCS: Performed by: FAMILY MEDICINE

## 2020-01-12 PROCEDURE — 85027 COMPLETE CBC AUTOMATED: CPT

## 2020-01-12 PROCEDURE — 96372 THER/PROPH/DIAG INJ SC/IM: CPT

## 2020-01-12 PROCEDURE — 96375 TX/PRO/DX INJ NEW DRUG ADDON: CPT

## 2020-01-12 PROCEDURE — 6370000000 HC RX 637 (ALT 250 FOR IP): Performed by: FAMILY MEDICINE

## 2020-01-12 PROCEDURE — 94761 N-INVAS EAR/PLS OXIMETRY MLT: CPT

## 2020-01-12 PROCEDURE — 96376 TX/PRO/DX INJ SAME DRUG ADON: CPT

## 2020-01-12 PROCEDURE — 2580000003 HC RX 258: Performed by: NURSE PRACTITIONER

## 2020-01-12 PROCEDURE — 6360000002 HC RX W HCPCS: Performed by: NURSE PRACTITIONER

## 2020-01-12 RX ORDER — ORPHENADRINE CITRATE 30 MG/ML
60 INJECTION INTRAMUSCULAR; INTRAVENOUS ONCE
Status: COMPLETED | OUTPATIENT
Start: 2020-01-12 | End: 2020-01-12

## 2020-01-12 RX ORDER — LORAZEPAM 2 MG/ML
1 INJECTION INTRAMUSCULAR ONCE
Status: COMPLETED | OUTPATIENT
Start: 2020-01-12 | End: 2020-01-12

## 2020-01-12 RX ORDER — DEXAMETHASONE SODIUM PHOSPHATE 4 MG/ML
10 INJECTION, SOLUTION INTRA-ARTICULAR; INTRALESIONAL; INTRAMUSCULAR; INTRAVENOUS; SOFT TISSUE ONCE
Status: COMPLETED | OUTPATIENT
Start: 2020-01-12 | End: 2020-01-12

## 2020-01-12 RX ORDER — ACETAMINOPHEN 325 MG/1
650 TABLET ORAL EVERY 6 HOURS PRN
Status: DISCONTINUED | OUTPATIENT
Start: 2020-01-12 | End: 2020-01-23 | Stop reason: HOSPADM

## 2020-01-12 RX ADMIN — OXYCODONE HYDROCHLORIDE AND ACETAMINOPHEN 1 TABLET: 5; 325 TABLET ORAL at 17:48

## 2020-01-12 RX ADMIN — OYSTER SHELL CALCIUM WITH VITAMIN D 1 TABLET: 500; 200 TABLET, FILM COATED ORAL at 09:02

## 2020-01-12 RX ADMIN — DICYCLOMINE HYDROCHLORIDE 20 MG: 20 TABLET ORAL at 14:56

## 2020-01-12 RX ADMIN — OXYCODONE HYDROCHLORIDE AND ACETAMINOPHEN 1 TABLET: 5; 325 TABLET ORAL at 05:24

## 2020-01-12 RX ADMIN — SODIUM CHLORIDE, PRESERVATIVE FREE 10 ML: 5 INJECTION INTRAVENOUS at 21:13

## 2020-01-12 RX ADMIN — MULTIPLE VITAMINS W/ MINERALS TAB 1 TABLET: TAB at 09:02

## 2020-01-12 RX ADMIN — PAROXETINE HYDROCHLORIDE 40 MG: 20 TABLET, FILM COATED ORAL at 21:12

## 2020-01-12 RX ADMIN — MECLIZINE HYDROCHLORIDE 25 MG: 25 TABLET ORAL at 21:12

## 2020-01-12 RX ADMIN — TIZANIDINE 4 MG: 4 TABLET ORAL at 21:11

## 2020-01-12 RX ADMIN — LEVOTHYROXINE SODIUM 125 MCG: 125 TABLET ORAL at 05:23

## 2020-01-12 RX ADMIN — DEXAMETHASONE SODIUM PHOSPHATE 10 MG: 4 INJECTION INTRA-ARTICULAR; INTRALESIONAL; INTRAMUSCULAR; INTRAVENOUS; SOFT TISSUE at 09:03

## 2020-01-12 RX ADMIN — ENOXAPARIN SODIUM 40 MG: 40 INJECTION SUBCUTANEOUS at 09:03

## 2020-01-12 RX ADMIN — MORPHINE SULFATE 2 MG: 2 INJECTION, SOLUTION INTRAMUSCULAR; INTRAVENOUS at 21:13

## 2020-01-12 RX ADMIN — TIZANIDINE 4 MG: 4 TABLET ORAL at 09:02

## 2020-01-12 RX ADMIN — MORPHINE SULFATE 2 MG: 2 INJECTION, SOLUTION INTRAMUSCULAR; INTRAVENOUS at 11:55

## 2020-01-12 RX ADMIN — MORPHINE SULFATE 2 MG: 2 INJECTION, SOLUTION INTRAMUSCULAR; INTRAVENOUS at 01:14

## 2020-01-12 RX ADMIN — VALPROATE SODIUM 500 MG: 100 INJECTION, SOLUTION INTRAVENOUS at 09:11

## 2020-01-12 RX ADMIN — FERROUS SULFATE TAB 325 MG (65 MG ELEMENTAL FE) 325 MG: 325 (65 FE) TAB at 21:12

## 2020-01-12 RX ADMIN — LORAZEPAM 0.5 MG: 0.5 TABLET ORAL at 01:17

## 2020-01-12 RX ADMIN — DICYCLOMINE HYDROCHLORIDE 20 MG: 20 TABLET ORAL at 21:11

## 2020-01-12 RX ADMIN — ONDANSETRON 4 MG: 2 INJECTION INTRAMUSCULAR; INTRAVENOUS at 16:42

## 2020-01-12 RX ADMIN — ONDANSETRON 4 MG: 2 INJECTION INTRAMUSCULAR; INTRAVENOUS at 09:03

## 2020-01-12 RX ADMIN — GABAPENTIN 800 MG: 400 CAPSULE ORAL at 14:56

## 2020-01-12 RX ADMIN — ORPHENADRINE CITRATE 60 MG: 30 INJECTION INTRAMUSCULAR; INTRAVENOUS at 09:11

## 2020-01-12 RX ADMIN — DICYCLOMINE HYDROCHLORIDE 20 MG: 20 TABLET ORAL at 09:02

## 2020-01-12 RX ADMIN — VALPROATE SODIUM 500 MG: 100 INJECTION, SOLUTION INTRAVENOUS at 16:42

## 2020-01-12 RX ADMIN — FERROUS SULFATE TAB 325 MG (65 MG ELEMENTAL FE) 325 MG: 325 (65 FE) TAB at 09:03

## 2020-01-12 RX ADMIN — DICYCLOMINE HYDROCHLORIDE 20 MG: 20 TABLET ORAL at 01:17

## 2020-01-12 RX ADMIN — GABAPENTIN 800 MG: 400 CAPSULE ORAL at 21:11

## 2020-01-12 RX ADMIN — PANTOPRAZOLE SODIUM 40 MG: 40 TABLET, DELAYED RELEASE ORAL at 05:23

## 2020-01-12 RX ADMIN — OYSTER SHELL CALCIUM WITH VITAMIN D 1 TABLET: 500; 200 TABLET, FILM COATED ORAL at 16:42

## 2020-01-12 RX ADMIN — PROMETHAZINE HYDROCHLORIDE 25 MG: 25 TABLET ORAL at 11:55

## 2020-01-12 RX ADMIN — GABAPENTIN 800 MG: 400 CAPSULE ORAL at 09:02

## 2020-01-12 RX ADMIN — ACETAMINOPHEN 650 MG: 325 TABLET ORAL at 11:55

## 2020-01-12 RX ADMIN — MORPHINE SULFATE 2 MG: 2 INJECTION, SOLUTION INTRAMUSCULAR; INTRAVENOUS at 16:43

## 2020-01-12 RX ADMIN — ATENOLOL 25 MG: 25 TABLET ORAL at 09:02

## 2020-01-12 RX ADMIN — MECLIZINE HYDROCHLORIDE 25 MG: 25 TABLET ORAL at 14:56

## 2020-01-12 RX ADMIN — MECLIZINE HYDROCHLORIDE 25 MG: 25 TABLET ORAL at 09:02

## 2020-01-12 RX ADMIN — Medication 5000 UNITS: at 09:02

## 2020-01-12 RX ADMIN — LORAZEPAM 1 MG: 2 INJECTION INTRAMUSCULAR; INTRAVENOUS at 09:03

## 2020-01-12 RX ADMIN — VALPROATE SODIUM 500 MG: 100 INJECTION, SOLUTION INTRAVENOUS at 02:03

## 2020-01-12 RX ADMIN — MORPHINE SULFATE 2 MG: 2 INJECTION, SOLUTION INTRAMUSCULAR; INTRAVENOUS at 06:45

## 2020-01-12 RX ADMIN — ONDANSETRON 4 MG: 2 INJECTION INTRAMUSCULAR; INTRAVENOUS at 01:14

## 2020-01-12 ASSESSMENT — PAIN DESCRIPTION - LOCATION: LOCATION: ABDOMEN

## 2020-01-12 ASSESSMENT — PAIN SCALES - GENERAL
PAINLEVEL_OUTOF10: 2
PAINLEVEL_OUTOF10: 8
PAINLEVEL_OUTOF10: 7
PAINLEVEL_OUTOF10: 7
PAINLEVEL_OUTOF10: 0
PAINLEVEL_OUTOF10: 0
PAINLEVEL_OUTOF10: 8
PAINLEVEL_OUTOF10: 8
PAINLEVEL_OUTOF10: 6
PAINLEVEL_OUTOF10: 6
PAINLEVEL_OUTOF10: 2

## 2020-01-12 ASSESSMENT — PAIN DESCRIPTION - FREQUENCY: FREQUENCY: CONTINUOUS

## 2020-01-12 ASSESSMENT — PAIN DESCRIPTION - ONSET: ONSET: ON-GOING

## 2020-01-12 ASSESSMENT — PAIN DESCRIPTION - ORIENTATION: ORIENTATION: MID

## 2020-01-12 ASSESSMENT — PAIN DESCRIPTION - DESCRIPTORS: DESCRIPTORS: CONSTANT;SHARP

## 2020-01-12 ASSESSMENT — PAIN DESCRIPTION - PAIN TYPE: TYPE: ACUTE PAIN

## 2020-01-12 ASSESSMENT — PAIN DESCRIPTION - PROGRESSION: CLINICAL_PROGRESSION: NOT CHANGED

## 2020-01-12 NOTE — PROGRESS NOTES
Neurology Service Progress Note   [facility]  Patient Name: Marc Post  : 1970        Subjective:   Patient seen and examined today. States that the medications I gave her yesterday did abort the headache but she has a mild 1 now, she is also sedated the room having a panic attack and she just vomited blood she is shaking all over she states to me \"I am afraid I am to have a seizure because of the pain\" I told her she is not going to have a seizure because she does not have epilepsy and that once her pains under control she will feel better, I did discuss with nursing staff that she is having a panic attack and recommended some call me procedures.       :     Medications:  Scheduled Meds:   meclizine  25 mg Oral TID    PARoxetine  40 mg Oral Nightly    gabapentin  800 mg Oral TID    levothyroxine  125 mcg Oral Daily    therapeutic multivitamin-minerals  1 tablet Oral Daily    dicyclomine  20 mg Oral Q6H    atenolol  25 mg Oral Daily    tiZANidine  4 mg Oral BID    ferrous sulfate  325 mg Oral BID    vitamin D  5,000 Units Oral Daily    pantoprazole  40 mg Oral QAM AC    sodium chloride flush  10 mL Intravenous 2 times per day    enoxaparin  40 mg Subcutaneous Daily    calcium-vitamin D  1 tablet Oral BID WC     Continuous Infusions:   dextrose 5% and 0.45% NaCl with KCl 20 mEq 50 mL/hr at 20     PRN Meds:.diphenhydrAMINE, ondansetron, oxyCODONE-acetaminophen, promethazine, sodium chloride flush, magnesium hydroxide, ondansetron, morphine, LORazepam             Review of Symptoms:    10-point system review completed. All of which are negative except as mentioned above. Physical Exam:        Gen: A&O x 4, NAD, cooperative, obese, anxious  HEENT: NC/AT, EOMI, PERRL, mmm, neck supple, no meningeal signs;    Heart: Regular   Lungs: no distress  Ext: no edema, no calf tenderness b/l  Psych: Anxious shaking  Skin: no rashes or lesions    NEUROLOGIC EXAM:    Mental Status: A&O to self, location, month and year, NAD, speech clear, language fluent, repetition and naming intact, follows commands appropriately    Cranial Nerve Exam:   CN II-XII: , PERRL, VFF, no nystagmus, no gaze paresis, sensation V1-V3 intact b/l, muscles of facial expression symmetric; hearing intact to conversational tone, palate elevates symmetrically, shoulder elevation symmetric and tongue protrudes midline with movement side to side. Motor Exam:       Antigravity x4    Deep Tendon Reflexes: trace/4 biceps, triceps, brachioradialis, patellar, and achilles b/l; flexor plantar responses b/l    Sensation: Intact light touch/temp UE's/LE's b/l (temp decreased in stocking distribution ble)    Coordination/Cerebellum:       Tremors--none      Rapidly alternating movements: no dysdiadochokinesia b/l                Finger-to-Nose: no dysmetria b/l    Gait and stance:      Gait: deferred      LABS:     Recent Labs     20  1625 20  1655 01/10/20  0559 01/10/20  0600 20  0537 20  0504   WBC 5.0  --   --  3.9* 4.0 8.4     --   --  137  --   --    K 5.0  --   --  4.4  --   --      --   --  104  --   --    CO2 19*  --   --  24  --   --    BUN 15  --   --  14  --   --    CREATININE 0.6  --   --  0.7  --   --    GLUCOSE 103*  --   --  98  --   --    INR  --  ? 0.93  --   --   --          IMAGING:    MRA:  unnremarkable noncontrast MR angiogram of the head. MRI brain:  Unremarkable noncontrast MRI brain examination. ASSESSMENT/PLAN:     3 44year old female with right unilateral cephalgia consistent with migraine superimposed on chronic visual changes and seizure v. Pseudoseizure. Plan of care as follows:  1. Neuro Exam:  1. Non focal  2. Neurodiagnostics:  1. MRI brain non acute  2. MRA brain non acute  3. Medications:  1. D/C Vimpat  2. D/C Keppra   3. Decadron, Depacon, Benadryl and Norflex for acute headache , dosages per eMAR  4. PT/OT/ST:  1. Per their recommendations  5.  Follow up:  1. She needs a seizure monitoring unit outpatient, recommend [facility], number provided, once headache tolerable she is stable for discharge. Thank you for allowing us to participate in the care of your patient. If there are any questions regarding evaluation please feel free to contact us.      MICHAEL Gutierrez - GINETTE, 1/12/2020

## 2020-01-12 NOTE — PROGRESS NOTES
Received a call that this pt was vomiting blood per neurology NP [facility], pt was also found to have a temp of 102.1 with a mews of 4, Dr Pete Gomez made aware.

## 2020-01-13 ENCOUNTER — APPOINTMENT (OUTPATIENT)
Dept: GENERAL RADIOLOGY | Age: 50
DRG: 315 | End: 2020-01-13
Payer: COMMERCIAL

## 2020-01-13 ENCOUNTER — APPOINTMENT (OUTPATIENT)
Dept: MRI IMAGING | Age: 50
DRG: 315 | End: 2020-01-13
Payer: COMMERCIAL

## 2020-01-13 LAB
ADENOVIRUS DETECTION BY PCR: NOT DETECTED
ANION GAP SERPL CALCULATED.3IONS-SCNC: 14 MMOL/L (ref 4–16)
BORDETELLA PERTUSSIS PCR: NOT DETECTED
BUN BLDV-MCNC: 11 MG/DL (ref 6–23)
CALCIUM SERPL-MCNC: 9.6 MG/DL (ref 8.3–10.6)
CHLAMYDOPHILA PNEUMONIA PCR: NOT DETECTED
CHLORIDE BLD-SCNC: 97 MMOL/L (ref 99–110)
CO2: 20 MMOL/L (ref 21–32)
CORONAVIRUS 229E PCR: NOT DETECTED
CORONAVIRUS HKU1 PCR: NOT DETECTED
CORONAVIRUS NL63 PCR: NOT DETECTED
CORONAVIRUS OC43 PCR: NOT DETECTED
CREAT SERPL-MCNC: 0.8 MG/DL (ref 0.6–1.1)
D DIMER: 1048 NG/ML(DDU)
ERYTHROCYTE SEDIMENTATION RATE: 20 MM/HR (ref 0–30)
GFR AFRICAN AMERICAN: >60 ML/MIN/1.73M2
GFR NON-AFRICAN AMERICAN: >60 ML/MIN/1.73M2
GLUCOSE BLD-MCNC: 107 MG/DL (ref 70–99)
HCT VFR BLD CALC: 38.9 % (ref 37–47)
HEMOGLOBIN: 11.4 GM/DL (ref 12.5–16)
HIGH SENSITIVE C-REACTIVE PROTEIN: 163 MG/L
HUMAN METAPNEUMOVIRUS PCR: NOT DETECTED
INFLUENZA A BY PCR: NOT DETECTED
INFLUENZA A H1 (2009) PCR: NOT DETECTED
INFLUENZA A H1 PANDEMIC PCR: NOT DETECTED
INFLUENZA A H3 PCR: NOT DETECTED
INFLUENZA B BY PCR: NOT DETECTED
MCH RBC QN AUTO: 26.6 PG (ref 27–31)
MCHC RBC AUTO-ENTMCNC: 29.3 % (ref 32–36)
MCV RBC AUTO: 90.9 FL (ref 78–100)
MYCOPLASMA PNEUMONIAE PCR: NOT DETECTED
PARAINFLUENZA 1 PCR: NOT DETECTED
PARAINFLUENZA 2 PCR: NOT DETECTED
PARAINFLUENZA 3 PCR: NOT DETECTED
PARAINFLUENZA 4 PCR: NOT DETECTED
PDW BLD-RTO: 14.2 % (ref 11.7–14.9)
PLATELET # BLD: 131 K/CU MM (ref 140–440)
PMV BLD AUTO: 9.7 FL (ref 7.5–11.1)
POTASSIUM SERPL-SCNC: 4.3 MMOL/L (ref 3.5–5.1)
PROCALCITONIN: 0.59
RBC # BLD: 4.28 M/CU MM (ref 4.2–5.4)
REASON FOR REJECTION: NORMAL
REASON FOR REJECTION: NORMAL
REJECTED TEST: NORMAL
RHINOVIRUS ENTEROVIRUS PCR: NOT DETECTED
RSV PCR: NOT DETECTED
SODIUM BLD-SCNC: 131 MMOL/L (ref 135–145)
WBC # BLD: 6 K/CU MM (ref 4–10.5)

## 2020-01-13 PROCEDURE — 85027 COMPLETE CBC AUTOMATED: CPT

## 2020-01-13 PROCEDURE — 72157 MRI CHEST SPINE W/O & W/DYE: CPT

## 2020-01-13 PROCEDURE — 72100 X-RAY EXAM L-S SPINE 2/3 VWS: CPT

## 2020-01-13 PROCEDURE — 87581 M.PNEUMON DNA AMP PROBE: CPT

## 2020-01-13 PROCEDURE — 6360000004 HC RX CONTRAST MEDICATION: Performed by: INTERNAL MEDICINE

## 2020-01-13 PROCEDURE — 2580000003 HC RX 258: Performed by: INTERNAL MEDICINE

## 2020-01-13 PROCEDURE — 93306 TTE W/DOPPLER COMPLETE: CPT

## 2020-01-13 PROCEDURE — 2500000003 HC RX 250 WO HCPCS: Performed by: NURSE PRACTITIONER

## 2020-01-13 PROCEDURE — 72158 MRI LUMBAR SPINE W/O & W/DYE: CPT

## 2020-01-13 PROCEDURE — G0378 HOSPITAL OBSERVATION PER HR: HCPCS

## 2020-01-13 PROCEDURE — 96372 THER/PROPH/DIAG INJ SC/IM: CPT

## 2020-01-13 PROCEDURE — 96376 TX/PRO/DX INJ SAME DRUG ADON: CPT

## 2020-01-13 PROCEDURE — 36415 COLL VENOUS BLD VENIPUNCTURE: CPT

## 2020-01-13 PROCEDURE — 87486 CHLMYD PNEUM DNA AMP PROBE: CPT

## 2020-01-13 PROCEDURE — 2500000003 HC RX 250 WO HCPCS: Performed by: FAMILY MEDICINE

## 2020-01-13 PROCEDURE — 86141 C-REACTIVE PROTEIN HS: CPT

## 2020-01-13 PROCEDURE — 71045 X-RAY EXAM CHEST 1 VIEW: CPT

## 2020-01-13 PROCEDURE — 6370000000 HC RX 637 (ALT 250 FOR IP): Performed by: FAMILY MEDICINE

## 2020-01-13 PROCEDURE — 1200000000 HC SEMI PRIVATE

## 2020-01-13 PROCEDURE — 87804 INFLUENZA ASSAY W/OPTIC: CPT

## 2020-01-13 PROCEDURE — 6360000002 HC RX W HCPCS: Performed by: FAMILY MEDICINE

## 2020-01-13 PROCEDURE — 84145 PROCALCITONIN (PCT): CPT

## 2020-01-13 PROCEDURE — A9579 GAD-BASE MR CONTRAST NOS,1ML: HCPCS | Performed by: INTERNAL MEDICINE

## 2020-01-13 PROCEDURE — 87798 DETECT AGENT NOS DNA AMP: CPT

## 2020-01-13 PROCEDURE — 87150 DNA/RNA AMPLIFIED PROBE: CPT

## 2020-01-13 PROCEDURE — 87186 SC STD MICRODIL/AGAR DIL: CPT

## 2020-01-13 PROCEDURE — 6360000002 HC RX W HCPCS: Performed by: INTERNAL MEDICINE

## 2020-01-13 PROCEDURE — 87147 CULTURE TYPE IMMUNOLOGIC: CPT

## 2020-01-13 PROCEDURE — 2580000003 HC RX 258: Performed by: FAMILY MEDICINE

## 2020-01-13 PROCEDURE — 87633 RESP VIRUS 12-25 TARGETS: CPT

## 2020-01-13 PROCEDURE — 85652 RBC SED RATE AUTOMATED: CPT

## 2020-01-13 PROCEDURE — 87040 BLOOD CULTURE FOR BACTERIA: CPT

## 2020-01-13 PROCEDURE — 99222 1ST HOSP IP/OBS MODERATE 55: CPT | Performed by: INTERNAL MEDICINE

## 2020-01-13 PROCEDURE — 85379 FIBRIN DEGRADATION QUANT: CPT

## 2020-01-13 PROCEDURE — 80048 BASIC METABOLIC PNL TOTAL CA: CPT

## 2020-01-13 PROCEDURE — 2580000003 HC RX 258: Performed by: NURSE PRACTITIONER

## 2020-01-13 RX ORDER — MECLIZINE HYDROCHLORIDE 25 MG/1
25 TABLET ORAL 3 TIMES DAILY
Qty: 30 TABLET | Refills: 0 | Status: CANCELLED | OUTPATIENT
Start: 2020-01-13 | End: 2020-01-23

## 2020-01-13 RX ORDER — VANCOMYCIN HYDROCHLORIDE 1 G/200ML
1000 INJECTION, SOLUTION INTRAVENOUS EVERY 8 HOURS
Status: DISCONTINUED | OUTPATIENT
Start: 2020-01-13 | End: 2020-01-14

## 2020-01-13 RX ORDER — MORPHINE SULFATE 4 MG/ML
4 INJECTION, SOLUTION INTRAMUSCULAR; INTRAVENOUS EVERY 4 HOURS PRN
Status: DISCONTINUED | OUTPATIENT
Start: 2020-01-13 | End: 2020-01-15

## 2020-01-13 RX ADMIN — PROMETHAZINE HYDROCHLORIDE 25 MG: 25 TABLET ORAL at 20:01

## 2020-01-13 RX ADMIN — MORPHINE SULFATE 2 MG: 2 INJECTION, SOLUTION INTRAMUSCULAR; INTRAVENOUS at 01:31

## 2020-01-13 RX ADMIN — OXYCODONE HYDROCHLORIDE AND ACETAMINOPHEN 1 TABLET: 5; 325 TABLET ORAL at 20:26

## 2020-01-13 RX ADMIN — LEVOTHYROXINE SODIUM 125 MCG: 125 TABLET ORAL at 06:42

## 2020-01-13 RX ADMIN — ATENOLOL 25 MG: 25 TABLET ORAL at 08:52

## 2020-01-13 RX ADMIN — ACETAMINOPHEN 650 MG: 325 TABLET ORAL at 02:46

## 2020-01-13 RX ADMIN — POTASSIUM CHLORIDE, DEXTROSE MONOHYDRATE AND SODIUM CHLORIDE: 150; 5; 450 INJECTION, SOLUTION INTRAVENOUS at 13:59

## 2020-01-13 RX ADMIN — GABAPENTIN 800 MG: 400 CAPSULE ORAL at 20:26

## 2020-01-13 RX ADMIN — CEFEPIME HYDROCHLORIDE 2 G: 2 INJECTION, POWDER, FOR SOLUTION INTRAVENOUS at 22:15

## 2020-01-13 RX ADMIN — PROMETHAZINE HYDROCHLORIDE 25 MG: 25 TABLET ORAL at 08:52

## 2020-01-13 RX ADMIN — SODIUM CHLORIDE, PRESERVATIVE FREE 10 ML: 5 INJECTION INTRAVENOUS at 19:51

## 2020-01-13 RX ADMIN — DICYCLOMINE HYDROCHLORIDE 20 MG: 20 TABLET ORAL at 01:38

## 2020-01-13 RX ADMIN — DICYCLOMINE HYDROCHLORIDE 20 MG: 20 TABLET ORAL at 08:52

## 2020-01-13 RX ADMIN — DICYCLOMINE HYDROCHLORIDE 20 MG: 20 TABLET ORAL at 13:58

## 2020-01-13 RX ADMIN — MORPHINE SULFATE 2 MG: 2 INJECTION, SOLUTION INTRAMUSCULAR; INTRAVENOUS at 06:42

## 2020-01-13 RX ADMIN — MULTIPLE VITAMINS W/ MINERALS TAB 1 TABLET: TAB at 08:52

## 2020-01-13 RX ADMIN — GABAPENTIN 800 MG: 400 CAPSULE ORAL at 13:58

## 2020-01-13 RX ADMIN — GADOTERIDOL 20 ML: 279.3 INJECTION, SOLUTION INTRAVENOUS at 18:36

## 2020-01-13 RX ADMIN — CEFEPIME HYDROCHLORIDE 2 G: 2 INJECTION, POWDER, FOR SOLUTION INTRAVENOUS at 15:06

## 2020-01-13 RX ADMIN — MORPHINE SULFATE 4 MG: 4 INJECTION, SOLUTION INTRAMUSCULAR; INTRAVENOUS at 17:59

## 2020-01-13 RX ADMIN — LORAZEPAM 0.5 MG: 0.5 TABLET ORAL at 13:58

## 2020-01-13 RX ADMIN — VALPROATE SODIUM 500 MG: 100 INJECTION, SOLUTION INTRAVENOUS at 01:31

## 2020-01-13 RX ADMIN — OYSTER SHELL CALCIUM WITH VITAMIN D 1 TABLET: 500; 200 TABLET, FILM COATED ORAL at 08:52

## 2020-01-13 RX ADMIN — TIZANIDINE 4 MG: 4 TABLET ORAL at 20:26

## 2020-01-13 RX ADMIN — DICYCLOMINE HYDROCHLORIDE 20 MG: 20 TABLET ORAL at 20:26

## 2020-01-13 RX ADMIN — MORPHINE SULFATE 4 MG: 4 INJECTION, SOLUTION INTRAMUSCULAR; INTRAVENOUS at 13:59

## 2020-01-13 RX ADMIN — TIZANIDINE 4 MG: 4 TABLET ORAL at 08:52

## 2020-01-13 RX ADMIN — FERROUS SULFATE TAB 325 MG (65 MG ELEMENTAL FE) 325 MG: 325 (65 FE) TAB at 20:26

## 2020-01-13 RX ADMIN — ACETAMINOPHEN 650 MG: 325 TABLET ORAL at 08:52

## 2020-01-13 RX ADMIN — ENOXAPARIN SODIUM 40 MG: 40 INJECTION SUBCUTANEOUS at 08:53

## 2020-01-13 RX ADMIN — ONDANSETRON 4 MG: 2 INJECTION INTRAMUSCULAR; INTRAVENOUS at 10:57

## 2020-01-13 RX ADMIN — MECLIZINE HYDROCHLORIDE 25 MG: 25 TABLET ORAL at 08:52

## 2020-01-13 RX ADMIN — VANCOMYCIN HYDROCHLORIDE 1000 MG: 1 INJECTION, SOLUTION INTRAVENOUS at 19:49

## 2020-01-13 RX ADMIN — OXYCODONE HYDROCHLORIDE AND ACETAMINOPHEN 1 TABLET: 5; 325 TABLET ORAL at 12:08

## 2020-01-13 RX ADMIN — OXYCODONE HYDROCHLORIDE AND ACETAMINOPHEN 1 TABLET: 5; 325 TABLET ORAL at 02:46

## 2020-01-13 RX ADMIN — FERROUS SULFATE TAB 325 MG (65 MG ELEMENTAL FE) 325 MG: 325 (65 FE) TAB at 08:52

## 2020-01-13 RX ADMIN — MECLIZINE HYDROCHLORIDE 25 MG: 25 TABLET ORAL at 20:26

## 2020-01-13 RX ADMIN — MORPHINE SULFATE 2 MG: 2 INJECTION, SOLUTION INTRAMUSCULAR; INTRAVENOUS at 10:57

## 2020-01-13 RX ADMIN — ACETAMINOPHEN 650 MG: 325 TABLET ORAL at 20:00

## 2020-01-13 RX ADMIN — GABAPENTIN 800 MG: 400 CAPSULE ORAL at 08:52

## 2020-01-13 RX ADMIN — MORPHINE SULFATE 4 MG: 4 INJECTION, SOLUTION INTRAMUSCULAR; INTRAVENOUS at 22:15

## 2020-01-13 RX ADMIN — ONDANSETRON 4 MG: 2 INJECTION INTRAMUSCULAR; INTRAVENOUS at 01:31

## 2020-01-13 RX ADMIN — Medication 5000 UNITS: at 08:52

## 2020-01-13 RX ADMIN — PAROXETINE HYDROCHLORIDE 40 MG: 20 TABLET, FILM COATED ORAL at 20:26

## 2020-01-13 RX ADMIN — MECLIZINE HYDROCHLORIDE 25 MG: 25 TABLET ORAL at 13:58

## 2020-01-13 RX ADMIN — PANTOPRAZOLE SODIUM 40 MG: 40 TABLET, DELAYED RELEASE ORAL at 06:42

## 2020-01-13 ASSESSMENT — PAIN DESCRIPTION - FREQUENCY
FREQUENCY: CONTINUOUS

## 2020-01-13 ASSESSMENT — PAIN DESCRIPTION - DESCRIPTORS
DESCRIPTORS: CONSTANT

## 2020-01-13 ASSESSMENT — PAIN SCALES - GENERAL
PAINLEVEL_OUTOF10: 9
PAINLEVEL_OUTOF10: 2
PAINLEVEL_OUTOF10: 6
PAINLEVEL_OUTOF10: 7
PAINLEVEL_OUTOF10: 9
PAINLEVEL_OUTOF10: 3
PAINLEVEL_OUTOF10: 8
PAINLEVEL_OUTOF10: 7
PAINLEVEL_OUTOF10: 9
PAINLEVEL_OUTOF10: 8
PAINLEVEL_OUTOF10: 7
PAINLEVEL_OUTOF10: 9
PAINLEVEL_OUTOF10: 7
PAINLEVEL_OUTOF10: 9
PAINLEVEL_OUTOF10: 3
PAINLEVEL_OUTOF10: 7
PAINLEVEL_OUTOF10: 7

## 2020-01-13 ASSESSMENT — PAIN DESCRIPTION - ONSET
ONSET: ON-GOING

## 2020-01-13 ASSESSMENT — PAIN DESCRIPTION - PROGRESSION
CLINICAL_PROGRESSION: NOT CHANGED
CLINICAL_PROGRESSION: NOT CHANGED

## 2020-01-13 ASSESSMENT — PAIN DESCRIPTION - ORIENTATION
ORIENTATION: MID
ORIENTATION: LOWER
ORIENTATION: MID

## 2020-01-13 ASSESSMENT — PAIN DESCRIPTION - PAIN TYPE
TYPE: ACUTE PAIN

## 2020-01-13 ASSESSMENT — PAIN DESCRIPTION - LOCATION
LOCATION: ABDOMEN
LOCATION: ABDOMEN
LOCATION: BACK

## 2020-01-13 NOTE — PROGRESS NOTES
[address] [facility]  consulted by Dr. Delilah Huggins for monitoring and adjustment. Indication for treatment: concern for endocarditis   Goal trough: 15-20 mcg/mL     Pertinent Laboratory Values:   Temp Readings from Last 3 Encounters:   01/13/20 101.9 °F (38.8 °C) (Oral)   10/31/19 98.1 °F (36.7 °C) (Oral)   10/16/19 97.8 °F (36.6 °C) (Oral)     Recent Labs     01/11/20  0537 01/12/20  0504   WBC 4.0 8.4     No results for input(s): BUN, CREATININE in the last 72 hours. Estimated Creatinine Clearance: 126 mL/min (based on SCr of 0.7 mg/dL). Intake/Output Summary (Last 24 hours) at 1/13/2020 1451  Last data filed at 1/13/2020 1849  Gross per 24 hour   Intake 1110 ml   Output --   Net 1110 ml       Pertinent Cultures:  Date    Source    Results  1/13   Blood    Sent  1/10   Blood    NGTD    Vancomycin level:   TROUGH:  No results for input(s): VANCOTROUGH in the last 72 hours. RANDOM:  No results for input(s): VANCORANDOM in the last 72 hours. Assessment:  · WBC and temperature: WNL  · SCr, BUN, and urine output: at baseline  · Day(s) of therapy: 1   · Vancomycin level: to be collected    Plan:  · Will start vancomycin 1000 mg q8h IVPB   · Trough prior to fourth total dose   · Pharmacy will continue to monitor patient and adjust therapy as indicated    Thank you for the consult.   Jessica Cantrell [facility]  1/13/2020 2:51 PM

## 2020-01-13 NOTE — PROGRESS NOTES
Patient educated on importance of not tampering with her mediport. She had removed it and all her telemetry leads. Patient is alert and oriented. Mediport reapplied and telemetry wires applied.

## 2020-01-13 NOTE — PROGRESS NOTES
Attending Progress Note      PCP: Jeanine Whitman MD    Patient: Lianna Peres   Gender: female  : 1970   Age: 44 y.o. MRN: [id number]      Date of Admission: 2020    Chief Complaint:   Chief Complaint   Patient presents with    Dizziness     onset 3-4 days ago. reports dizziness and blurry vision    Abdominal Pain     hx of pancreatitis, reports right upper abd pain radiating around to her back with N/V/D           Subjective: still has headache  . ... lumbar pian . .. fever   No chills , no N/V/diarrhea     Medications:  Reviewed  Infusion Medications    dextrose 5% and 0.45% NaCl with KCl 20 mEq 50 mL/hr at 20 1359     Scheduled Medications    cefepime  2 g Intravenous Q8H    vancomycin  1,000 mg Intravenous Q8H    meclizine  25 mg Oral TID    PARoxetine  40 mg Oral Nightly    gabapentin  800 mg Oral TID    levothyroxine  125 mcg Oral Daily    therapeutic multivitamin-minerals  1 tablet Oral Daily    dicyclomine  20 mg Oral Q6H    atenolol  25 mg Oral Daily    tiZANidine  4 mg Oral BID    ferrous sulfate  325 mg Oral BID    vitamin D  5,000 Units Oral Daily    pantoprazole  40 mg Oral QAM AC    sodium chloride flush  10 mL Intravenous 2 times per day    enoxaparin  40 mg Subcutaneous Daily    calcium-vitamin D  1 tablet Oral BID WC     PRN Meds: morphine, acetaminophen, diphenhydrAMINE, ondansetron, oxyCODONE-acetaminophen, promethazine, sodium chloride flush, magnesium hydroxide, ondansetron, LORazepam      Intake/Output Summary (Last 24 hours) at 2020 1703  Last data filed at 2020 3211  Gross per 24 hour   Intake 1110 ml   Output --   Net 1110 ml       Exam:  /69   Pulse 85   Temp 101.9 °F (38.8 °C) (Oral)   Resp 11   Ht 5' 4\" (1.626 m)   Wt 281 lb 9.6 oz (127.7 kg)   SpO2 95%   BMI 48.34 kg/m²   General appearance: No distress,   Respiratory:  symmetrical , good air entry , no Rales , No wheezing, or rhonchi,  Cardiovascular: RRR, with

## 2020-01-13 NOTE — CONSULTS
Infectious Disease Consult Note  2020   Patient Name: Karla Guerrero : 1970   Impression   Fever  o History of left first metatarsal chronic osteomyelitis-MSSA, pseudomonas aeruginosa catheter related bloodstream infection (CRBSI). Presented with dizziness, headache, low back pain. No fever in the first day of admission. Need to evaluate for hospital-acquired infection-bloodstream infection and osteomyelitis.  Constipation   Multi-morbidity: per PMHx obesity, status post gastric sleeve surgery, recurrent emesis and hematemesis  Plan:   Repeat blood cultures from periphery and from Mediport. Check CRP, ESR, MRI of thoracolumbar spine with gadolinium (no known cross-reactivity with shellfish allergy)   Start empiric vancomycin and cefepime   Echocardiogram    Thank you for allowing me to consult in the care of this patient.  ------------------------  REASON FOR CONSULT: Infective syndrome   Requested by: Dr. Compa Pavon is a 44 y.o.  female with morbid obesity, right anterior chest wall Mediport, previous history of left MSSA metatarsal osteomyelitis (completed antibiotic therapy) who was admitted 2020 for further evaluation and management of 2-week history of worsening dizziness and blurred vision, four-day history of emesis. She has associated headaches. While in the hospital she has had fever spikes on  through . Denies diarrhea. MRI and MRA of the brain without contrast shows no acute abnormality. CT of the abdomen and pelvis shows no acute abnormality either. ?  Infectious diseases service was consulted to evaluate the pt, and recommend further investigative and therapeutic measures. ROS: Other systems reviewed Including eyes, ENT, respiratory, cardiovascular, GI, , dermatologic, neurologic, psych, hem/lymphatic, musculoskeletal and endocrine were negative other than what is mentioned above.      Patient Active Problem List    Diagnosis Date on exertion     Shortness of breath on exertion     Sleep apnea     Has CPAP\"no longer use the cpap since lost weight\"    Staph infection Dx 11-15    Left Foot    Staph infection Dx 11-15    \"Left Foot\"    Teeth missing     Upper And Lower    Thyroid disease     UTI (urinary tract infection) In Past    No Current Symptoms    Wears glasses     To Read      Past Surgical History:   Procedure Laterality Date    APPENDECTOMY  02/1998    Done When Tubes And Ovaries Were Removed    CARDIAC CATHETERIZATION  10/24/2010    CATHETER REMOVAL N/A 7/16/2019    PORT REMOVAL performed [facility] Edda L[facility]hal, [facility]  08/27/1991    CHOLECYSTECTOMY, LAPAROSCOPIC  1990's    COLONOSCOPY  Last Done In 2000's    DENTAL SURGERY      Teeth Extracted In Past    ENDOSCOPY, COLON, DIAGNOSTIC  Last Done In 2018    FOOT DEBRIDEMENT Left 6/16/2019    FIRST METATARSAL DEBRIDEMENT INCISION AND DRAINAGE. EXCISION OF ULCER.  RESECTION OF BONE. 1ST METATARSAL POWER LAVAGE AND BONE CEMENT performed by Gregor Espitia DPM at [facility] Last Done In 2016     Dr. Jackie Frazier, \" About 6 Surgeries Done Because Of Staph Infection\"    HIATAL HERNIA REPAIR N/A 2/12/2019    HERNIA HIATAL LAPAROSCOPIC ROBOTIC performed by Edda Leventhal, MD at [address] [place] [address]  10/1997    Partial Abdominal Hysterectomy    INSERTION / REMOVAL / REPLACEMENT VENOUS ACCESS CATHETER N/A 8/15/2019    PORT INSERTION performed by Edda Leventhal, MD at [address] [place] [address]    removed after 6 months    LUNG REMOVAL, PARTIAL Left 2008    Benign    OTHER SURGICAL HISTORY  02/1998    \"Tubes And Ovaries Removed, Appendectomy Also Done\"    OTHER SURGICAL HISTORY  Last Done 7-15-16    Mediport Insertion \"Total Of Six Done, Removed Last Mediport In 2014\"    SLEEVE GASTRECTOMY N/A 2/12/2019    GASTRECTOMY SLEEVE LAPAROSCOPIC ROBOTIC performed by Edda Leventhal, MD at [place] [address] None  ?  -------------------------------------------------------------------------------------------------------------------    Vital Signs:  Vitals:    01/13/20 1218   BP:    Pulse:    Resp:    Temp: 99.9 °F (37.7 °C)   SpO2:          Exam:    VS: noted; wt 127.7 kg  Gen: alert and oriented X3, no distress  Skin: no stigmata of endocarditis, appears flushed. Wounds: C/D/I  HEMT: AT/NC Oropharynx pink, moist, and without lesions or exudates; dentition in good state of repair  Eyes: PERRLA, EOMI, conjunctiva pink, sclera anicteric. Neck: Supple. Trachea midline. No LAD. Chest: no distress and CTA. Good air movement. Heart: RRR and no MRG. Abd: soft, non-distended, no tenderness, no hepatomegaly. Normoactive bowel sounds. Ext: no clubbing, cyanosis, or edema  Catheter Site: Right anterior chest wall Mediport without erythema or tenderness  Neuro: Mental status intact. CN 2-12 intact and no focal sensory or motor deficits  Musculoskeletal: Lower lumbar spine tenderness. ?Diagnostic Studies: reviewed  ? ? I have examined this patient and available medical records on this date and have made the above observations, conclusions and recommendations.   Electronically signed by: Electronically signed by Trish Sales MD on 1/13/2020 at 1:17 PM

## 2020-01-13 NOTE — PROGRESS NOTES
Attending Progress Note      PCP: Javier Rosales MD    Patient: Morelia Cárdenas   Gender: female  : 1970   Age: 44 y.o. MRN: [id number]      Date of Admission: 2020    Chief Complaint:   Chief Complaint   Patient presents with    Dizziness     onset 3-4 days ago. reports dizziness and blurry vision    Abdominal Pain     hx of pancreatitis, reports right upper abd pain radiating around to her back with N/V/D           Subjective: still has headache  . ... questionable blood in vomit   No fever/chills , no N/V/diarrhea     Medications:  Reviewed  Infusion Medications    dextrose 5% and 0.45% NaCl with KCl 20 mEq 50 mL/hr at 20     Scheduled Medications    valproate sodium (DEPACON) IVPB  500 mg Intravenous Q8H    meclizine  25 mg Oral TID    PARoxetine  40 mg Oral Nightly    gabapentin  800 mg Oral TID    levothyroxine  125 mcg Oral Daily    therapeutic multivitamin-minerals  1 tablet Oral Daily    dicyclomine  20 mg Oral Q6H    atenolol  25 mg Oral Daily    tiZANidine  4 mg Oral BID    ferrous sulfate  325 mg Oral BID    vitamin D  5,000 Units Oral Daily    pantoprazole  40 mg Oral QAM AC    sodium chloride flush  10 mL Intravenous 2 times per day    enoxaparin  40 mg Subcutaneous Daily    calcium-vitamin D  1 tablet Oral BID WC     PRN Meds: acetaminophen, diphenhydrAMINE, ondansetron, oxyCODONE-acetaminophen, promethazine, sodium chloride flush, magnesium hydroxide, ondansetron, morphine, LORazepam    No intake or output data in the 24 hours ending 20    Exam:  /78   Pulse 75   Temp 98.6 °F (37 °C) (Oral)   Resp 20   Ht 5' 4\" (1.626 m)   Wt 281 lb 9.6 oz (127.7 kg)   SpO2 98%   BMI 48.34 kg/m²   General appearance: No distress,   Respiratory:  symmetrical , good air entry , no Rales , No wheezing, or rhonchi,  Cardiovascular: RRR, with normal S1/S2 without murmurs. Abdomen : Soft, tender, non-distended  , normal bowel sounds.   Legs : No edema bilaterally. No DVT signs ,    Neurologic:  Alert and oriented , no focal sensory/motor deficits , grossly non-focal.        Labs:   Recent Labs     01/10/20  0600 01/11/20  0537 01/12/20  0504   WBC 3.9* 4.0 8.4   HGB 11.2* 10.9* 11.9*   HCT 36.3* 35.4* 38.2    181 193     Recent Labs     01/10/20  0600      K 4.4      CO2 24   BUN 14   CREATININE 0.7   CALCIUM 10.2     No results for input(s): AST, ALT, BILIDIR, BILITOT, ALKPHOS in the last 72 hours. Recent Labs     01/10/20  0559   INR 0.93     No results for input(s): Tao Ladd in the last 72 hours. Assessment/Plan:    Active Hospital Problems    Diagnosis Date Noted    Dizziness [R42] 01/09/2020    Generalized abdominal pain [R10.84] 09/26/2015    Nausea & vomiting [R11.2] 04/19/2013       Tele   IVF , clear liquid diet . . monitor for blood in vomit and H/H   Symptomatic treatment Zofran, Phenergan PRN. ...h/h stable . Pain control Morphine, percocet   Blood culture :pending   Brain MRI/MRA : both with no concern . .pt still dizzy with double vision . ...  neuro input noted . . plan to D/C when headache controlled   Antivert TID   Home meds   DVT proph     Diet: DIET CLEAR LIQUID;  Code Status: Full Code    Treatment progress and plan was d/w pt/family .         Catherine Espinosa MD

## 2020-01-14 ENCOUNTER — APPOINTMENT (OUTPATIENT)
Dept: CT IMAGING | Age: 50
DRG: 315 | End: 2020-01-14
Payer: COMMERCIAL

## 2020-01-14 LAB
ANION GAP SERPL CALCULATED.3IONS-SCNC: 11 MMOL/L (ref 4–16)
BUN BLDV-MCNC: 17 MG/DL (ref 6–23)
CALCIUM SERPL-MCNC: 9.2 MG/DL (ref 8.3–10.6)
CHLORIDE BLD-SCNC: 101 MMOL/L (ref 99–110)
CO2: 22 MMOL/L (ref 21–32)
CREAT SERPL-MCNC: 1 MG/DL (ref 0.6–1.1)
GFR AFRICAN AMERICAN: >60 ML/MIN/1.73M2
GFR NON-AFRICAN AMERICAN: 58 ML/MIN/1.73M2
GLUCOSE BLD-MCNC: 118 MG/DL (ref 70–99)
HCT VFR BLD CALC: 33.1 % (ref 37–47)
HCT VFR BLD CALC: 35.8 % (ref 37–47)
HEMOGLOBIN: 11 GM/DL (ref 12.5–16)
HEMOGLOBIN: 9.8 GM/DL (ref 12.5–16)
HIGH SENSITIVE C-REACTIVE PROTEIN: 496 MG/L
MCH RBC QN AUTO: 26.1 PG (ref 27–31)
MCH RBC QN AUTO: 26.2 PG (ref 27–31)
MCHC RBC AUTO-ENTMCNC: 29.6 % (ref 32–36)
MCHC RBC AUTO-ENTMCNC: 30.7 % (ref 32–36)
MCV RBC AUTO: 84.8 FL (ref 78–100)
MCV RBC AUTO: 88.5 FL (ref 78–100)
PDW BLD-RTO: 14.2 % (ref 11.7–14.9)
PDW BLD-RTO: 14.4 % (ref 11.7–14.9)
PLATELET # BLD: 122 K/CU MM (ref 140–440)
PLATELET # BLD: 124 K/CU MM (ref 140–440)
PMV BLD AUTO: 11 FL (ref 7.5–11.1)
PMV BLD AUTO: 9.9 FL (ref 7.5–11.1)
POTASSIUM SERPL-SCNC: 4.5 MMOL/L (ref 3.5–5.1)
PROCALCITONIN: 2.57
RBC # BLD: 3.74 M/CU MM (ref 4.2–5.4)
RBC # BLD: 4.22 M/CU MM (ref 4.2–5.4)
SODIUM BLD-SCNC: 134 MMOL/L (ref 135–145)
WBC # BLD: 5.7 K/CU MM (ref 4–10.5)
WBC # BLD: 6.8 K/CU MM (ref 4–10.5)

## 2020-01-14 PROCEDURE — 99233 SBSQ HOSP IP/OBS HIGH 50: CPT | Performed by: INTERNAL MEDICINE

## 2020-01-14 PROCEDURE — 80202 ASSAY OF VANCOMYCIN: CPT

## 2020-01-14 PROCEDURE — 94761 N-INVAS EAR/PLS OXIMETRY MLT: CPT

## 2020-01-14 PROCEDURE — 2500000003 HC RX 250 WO HCPCS: Performed by: FAMILY MEDICINE

## 2020-01-14 PROCEDURE — 6360000002 HC RX W HCPCS: Performed by: INTERNAL MEDICINE

## 2020-01-14 PROCEDURE — 1200000000 HC SEMI PRIVATE

## 2020-01-14 PROCEDURE — 2580000003 HC RX 258: Performed by: INTERNAL MEDICINE

## 2020-01-14 PROCEDURE — 99222 1ST HOSP IP/OBS MODERATE 55: CPT | Performed by: INTERNAL MEDICINE

## 2020-01-14 PROCEDURE — 84145 PROCALCITONIN (PCT): CPT

## 2020-01-14 PROCEDURE — 85027 COMPLETE CBC AUTOMATED: CPT

## 2020-01-14 PROCEDURE — 73200 CT UPPER EXTREMITY W/O DYE: CPT

## 2020-01-14 PROCEDURE — 6360000002 HC RX W HCPCS: Performed by: FAMILY MEDICINE

## 2020-01-14 PROCEDURE — 80048 BASIC METABOLIC PNL TOTAL CA: CPT

## 2020-01-14 PROCEDURE — 6370000000 HC RX 637 (ALT 250 FOR IP): Performed by: FAMILY MEDICINE

## 2020-01-14 PROCEDURE — 36415 COLL VENOUS BLD VENIPUNCTURE: CPT

## 2020-01-14 PROCEDURE — 86141 C-REACTIVE PROTEIN HS: CPT

## 2020-01-14 PROCEDURE — 99221 1ST HOSP IP/OBS SF/LOW 40: CPT | Performed by: SURGERY

## 2020-01-14 PROCEDURE — 2580000003 HC RX 258: Performed by: FAMILY MEDICINE

## 2020-01-14 RX ADMIN — FERROUS SULFATE TAB 325 MG (65 MG ELEMENTAL FE) 325 MG: 325 (65 FE) TAB at 09:44

## 2020-01-14 RX ADMIN — OXYCODONE HYDROCHLORIDE AND ACETAMINOPHEN 1 TABLET: 5; 325 TABLET ORAL at 03:56

## 2020-01-14 RX ADMIN — OYSTER SHELL CALCIUM WITH VITAMIN D 1 TABLET: 500; 200 TABLET, FILM COATED ORAL at 09:44

## 2020-01-14 RX ADMIN — GABAPENTIN 800 MG: 400 CAPSULE ORAL at 09:43

## 2020-01-14 RX ADMIN — MORPHINE SULFATE 4 MG: 4 INJECTION, SOLUTION INTRAMUSCULAR; INTRAVENOUS at 16:03

## 2020-01-14 RX ADMIN — ACETAMINOPHEN 650 MG: 325 TABLET ORAL at 16:03

## 2020-01-14 RX ADMIN — MECLIZINE HYDROCHLORIDE 25 MG: 25 TABLET ORAL at 09:43

## 2020-01-14 RX ADMIN — MECLIZINE HYDROCHLORIDE 25 MG: 25 TABLET ORAL at 20:06

## 2020-01-14 RX ADMIN — GABAPENTIN 800 MG: 400 CAPSULE ORAL at 20:06

## 2020-01-14 RX ADMIN — DICYCLOMINE HYDROCHLORIDE 20 MG: 20 TABLET ORAL at 20:06

## 2020-01-14 RX ADMIN — CEFEPIME HYDROCHLORIDE 2 G: 2 INJECTION, POWDER, FOR SOLUTION INTRAVENOUS at 06:15

## 2020-01-14 RX ADMIN — ATENOLOL 25 MG: 25 TABLET ORAL at 09:43

## 2020-01-14 RX ADMIN — OYSTER SHELL CALCIUM WITH VITAMIN D 1 TABLET: 500; 200 TABLET, FILM COATED ORAL at 16:03

## 2020-01-14 RX ADMIN — DICYCLOMINE HYDROCHLORIDE 20 MG: 20 TABLET ORAL at 09:43

## 2020-01-14 RX ADMIN — PAROXETINE HYDROCHLORIDE 40 MG: 20 TABLET, FILM COATED ORAL at 20:06

## 2020-01-14 RX ADMIN — Medication 5000 UNITS: at 09:43

## 2020-01-14 RX ADMIN — SODIUM CHLORIDE, PRESERVATIVE FREE 10 ML: 5 INJECTION INTRAVENOUS at 20:07

## 2020-01-14 RX ADMIN — LEVOTHYROXINE SODIUM 125 MCG: 125 TABLET ORAL at 06:15

## 2020-01-14 RX ADMIN — PANTOPRAZOLE SODIUM 40 MG: 40 TABLET, DELAYED RELEASE ORAL at 06:15

## 2020-01-14 RX ADMIN — MORPHINE SULFATE 4 MG: 4 INJECTION, SOLUTION INTRAMUSCULAR; INTRAVENOUS at 04:58

## 2020-01-14 RX ADMIN — ENOXAPARIN SODIUM 40 MG: 40 INJECTION SUBCUTANEOUS at 09:43

## 2020-01-14 RX ADMIN — VANCOMYCIN HYDROCHLORIDE 1000 MG: 1 INJECTION, SOLUTION INTRAVENOUS at 03:52

## 2020-01-14 RX ADMIN — MECLIZINE HYDROCHLORIDE 25 MG: 25 TABLET ORAL at 13:28

## 2020-01-14 RX ADMIN — DICYCLOMINE HYDROCHLORIDE 20 MG: 20 TABLET ORAL at 02:31

## 2020-01-14 RX ADMIN — TIZANIDINE 4 MG: 4 TABLET ORAL at 20:06

## 2020-01-14 RX ADMIN — MORPHINE SULFATE 4 MG: 4 INJECTION, SOLUTION INTRAMUSCULAR; INTRAVENOUS at 20:07

## 2020-01-14 RX ADMIN — FERROUS SULFATE TAB 325 MG (65 MG ELEMENTAL FE) 325 MG: 325 (65 FE) TAB at 20:06

## 2020-01-14 RX ADMIN — ACETAMINOPHEN 650 MG: 325 TABLET ORAL at 09:52

## 2020-01-14 RX ADMIN — DICYCLOMINE HYDROCHLORIDE 20 MG: 20 TABLET ORAL at 13:28

## 2020-01-14 RX ADMIN — POTASSIUM CHLORIDE, DEXTROSE MONOHYDRATE AND SODIUM CHLORIDE: 150; 5; 450 INJECTION, SOLUTION INTRAVENOUS at 20:06

## 2020-01-14 RX ADMIN — GABAPENTIN 800 MG: 400 CAPSULE ORAL at 13:28

## 2020-01-14 RX ADMIN — MORPHINE SULFATE 4 MG: 4 INJECTION, SOLUTION INTRAMUSCULAR; INTRAVENOUS at 09:52

## 2020-01-14 RX ADMIN — MULTIPLE VITAMINS W/ MINERALS TAB 1 TABLET: TAB at 09:43

## 2020-01-14 RX ADMIN — VANCOMYCIN HYDROCHLORIDE 1000 MG: 1 INJECTION, SOLUTION INTRAVENOUS at 11:48

## 2020-01-14 RX ADMIN — TIZANIDINE 4 MG: 4 TABLET ORAL at 09:43

## 2020-01-14 RX ADMIN — LORAZEPAM 0.5 MG: 0.5 TABLET ORAL at 09:52

## 2020-01-14 ASSESSMENT — PAIN DESCRIPTION - PROGRESSION
CLINICAL_PROGRESSION: NOT CHANGED
CLINICAL_PROGRESSION: GRADUALLY WORSENING
CLINICAL_PROGRESSION: NOT CHANGED

## 2020-01-14 ASSESSMENT — PAIN SCALES - GENERAL
PAINLEVEL_OUTOF10: 9
PAINLEVEL_OUTOF10: 7
PAINLEVEL_OUTOF10: 8
PAINLEVEL_OUTOF10: 2
PAINLEVEL_OUTOF10: 8
PAINLEVEL_OUTOF10: 9
PAINLEVEL_OUTOF10: 9
PAINLEVEL_OUTOF10: 8
PAINLEVEL_OUTOF10: 9

## 2020-01-14 ASSESSMENT — PAIN DESCRIPTION - PAIN TYPE: TYPE: ACUTE PAIN

## 2020-01-14 ASSESSMENT — PAIN DESCRIPTION - LOCATION: LOCATION: HAND

## 2020-01-14 NOTE — CONSULTS
(ENSURE HIGH PROTEIN) LIQD Take 1 Can by mouth Daily with lunch 8/7/19   Uriel Foote MD   ondansetron (ZOFRAN ODT) 4 MG disintegrating tablet Take 1 tablet by mouth every 8 hours as needed for Nausea or Vomiting 7/23/19   Essie Phoenix MD   Cholecalciferol (VITAMIN D3) 5000 units TABS Take 1 tablet by mouth daily    Historical Provider, MD   ferrous sulfate (FE TABS) 325 (65 Fe) MG EC tablet Take 1 tablet by mouth 2 times daily 6/25/19   Uriel Foote MD   tiZANidine (ZANAFLEX) 4 MG tablet Take 4 mg by mouth 2 times daily    Historical Provider, MD   estradiol (ESTRACE) 0.5 MG tablet Take 0.5 mg by mouth daily    Historical Provider, MD   dicyclomine (BENTYL) 20 MG tablet Take 20 mg by mouth every 6 hours    Historical Provider, MD   atenolol (TENORMIN) 25 MG tablet Take 25 mg by mouth daily    Historical Provider, MD   Multiple Vitamin (MVI, BARIATRIC ADVANTAGE MULTI-FORMULA, CHEW TAB) Take 1 tablet by mouth daily 2/22/19   Uriel Foote MD   Calcium Citrate-Vitamin D (CALCIUM + VIT D, BARIATRIC ADVANTAGE, CHEWABLE TABLET) Take 1 tablet by mouth 2 times daily 2/22/19   Uriel Foote MD   levothyroxine (SYNTHROID) 125 MCG tablet Take 1 tablet by mouth Daily  Patient taking differently: Take 125 mcg by mouth nightly  8/8/18   Perla Rodriguez MD   gabapentin (NEURONTIN) 800 MG tablet Take 800 mg by mouth 3 times daily    Historical Provider, MD   PARoxetine (PAXIL) 30 MG tablet Take 40 mg by mouth nightly     Historical Provider, MD        [facility] Meds:    Current Facility-Administered Medications   Medication Dose Route Frequency Provider Last Rate Last Dose    morphine sulfate (PF) injection 4 mg  4 mg Intravenous Q4H PRN Darline Cotto MD   4 mg at 01/14/20 0952    vancomycin (VANCOCIN) 1000 mg in dextrose 5% 200 mL IVPB  1,000 mg Intravenous Floridalma Bruno MD   Stopped at 01/14/20 1249    acetaminophen (TYLENOL) tablet 650 mg  650 mg Oral Q6H PRN Darline Cotto MD   650 mg at 01/14/20 0952    diphenhydrAMINE (BENADRYL) injection 50 mg  50 mg Intravenous Q6H PRN MICHAEL Shields - CNP        meclizine (ANTIVERT) tablet 25 mg  25 mg Oral TID Aliyah Coleman MD   25 mg at 01/14/20 1328    PARoxetine (PAXIL) tablet 40 mg  40 mg Oral Nightly Aliyah Coleman MD   40 mg at 01/13/20 2026    gabapentin (NEURONTIN) capsule 800 mg  800 mg Oral TID Aliyah Coleman MD   800 mg at 01/14/20 1328    levothyroxine (SYNTHROID) tablet 125 mcg  125 mcg Oral Daily Aliyah Coleman MD   125 mcg at 01/14/20 0615    therapeutic multivitamin-minerals 1 tablet  1 tablet Oral Daily Aliyah Coleman MD   1 tablet at 01/14/20 0943    dicyclomine (BENTYL) tablet 20 mg  20 mg Oral Q6H Darline Cotto MD   20 mg at 01/14/20 1328    atenolol (TENORMIN) tablet 25 mg  25 mg Oral Daily Aliyah Coleman MD   25 mg at 01/14/20 0943    tiZANidine (ZANAFLEX) tablet 4 mg  4 mg Oral BID Aliyah Coleman MD   4 mg at 01/14/20 0943    ferrous sulfate tablet 325 mg  325 mg Oral BID Aliyah Coleman MD   325 mg at 01/14/20 0944    vitamin D CAPS 5,000 Units  5,000 Units Oral Daily Aliyah Coleman MD   5,000 Units at 01/14/20 0943    ondansetron (ZOFRAN-ODT) disintegrating tablet 4 mg  4 mg Oral Q8H PRN Darline Cotto MD        oxyCODONE-acetaminophen (PERCOCET) 5-325 MG per tablet 1 tablet  1 tablet Oral Q8H PRN Aliyah Coleman MD   1 tablet at 01/14/20 0356    promethazine (PHENERGAN) tablet 25 mg  25 mg Oral Q6H PRN Aliyah Coleman MD   25 mg at 01/13/20 2001    pantoprazole (PROTONIX) tablet 40 mg  40 mg Oral QAM AC Darline Cotto MD   40 mg at 01/14/20 0615    sodium chloride flush 0.9 % injection 10 mL  10 mL Intravenous 2 times per day Aliyah Coleman MD   10 mL at 01/13/20 1951    sodium chloride flush 0.9 % injection 10 mL  10 mL Intravenous PRN Aliyah Coleman MD        magnesium hydroxide (MILK OF MAGNESIA) 400 MG/5ML suspension 30 mL  30 mL Oral Daily PRN Mtanious MD Kirti        [facility]ron [facility]) injection 4 mg  4 mg Intravenous Q6H PRN Darline Cotto MD   4 mg at 01/13/20 1057    enoxaparin (LOVENOX) injection 40 mg  40 mg Subcutaneous Daily Darline Cotto MD   40 mg at 01/14/20 0943    LORazepam (ATIVAN) tablet 0.5 mg  0.5 mg Oral Q12H PRN Darline Cotto MD   0.5 mg at 01/14/20 8613    dextrose 5 % and 0.45 % NaCl with KCl 20 mEq infusion   Intravenous Continuous Dmitri Lockett MD 50 mL/hr at 01/13/20 1359      calcium-vitamin D 500-200 MG-UNIT per tablet 1 tablet  1 tablet Oral BID WC Dmitri Lockett MD   1 tablet at 01/14/20 0944      dextrose 5% and 0.45% NaCl with KCl 20 mEq 50 mL/hr at 01/13/20 1359       Social History / Family History:     Social History     Socioeconomic History    Marital status:      Spouse name: None    Number of children: None    Years of education: None    Highest education level: None   Occupational History    None   Social Needs    Financial resource strain: None    Food insecurity:     Worry: None     Inability: None    Transportation needs:     Medical: None     Non-medical: None   Tobacco Use    Smoking status: Never Smoker    Smokeless tobacco: Never Used   Substance and Sexual Activity    Alcohol use: No     Alcohol/week: 0.0 standard drinks    Drug use: No    Sexual activity: Not Currently   Lifestyle    Physical activity:     Days per week: None     Minutes per session: None    Stress: None   Relationships    Social connections:     Talks on phone: None     Gets together: None     Attends [employer]ical service: None     Active member of club or organization: None     Attends meetings of clubs or organizations: None     Relationship status: None    Intimate partner violence:     Fear of current or ex partner: None     Emotionally abused: None     Physically abused: None     Forced sexual activity: None   Other Topics Concern    None   Social History Narrative    None      Family History no masses palpable. Extremities: Warm, well perfused, no cyanosis or edema. But joints are erythematous and swollen. Elinor Lukes all over. .  Skin: Skin color, texture, turgor normal.  Neurologic: Grossly normal, Cranial nerves from II to XII intact, Deep tendon reflexes normal.  Lymph nodes: No palpable LNs, Cervical, groins, abdominal.  Musculoskeletal: Bilateral Upper and lower extremities ROM limited by her all over pains. Radiologic / Imaging / TESTING  CT 1/9/2020: Impression   1. No acute abnormality seen in the abdomen or pelvis   2. Status post cholecystectomy   3. No obstructive uropathy   4.  Moderate stool burden seen throughout the colon            Labs:    Recent Results (from the past 24 hour(s))   CBC    Collection Time: 01/13/20  3:03 PM   Result Value Ref Range    WBC 6.0 4.0 - 10.5 K/CU MM    RBC 4.28 4.2 - 5.4 M/CU MM    Hemoglobin 11.4 (L) 12.5 - 16.0 GM/DL    Hematocrit 38.9 37 - 47 %    MCV 90.9 78 - 100 FL    MCH 26.6 (L) 27 - 31 PG    MCHC 29.3 (L) 32.0 - 36.0 %    RDW 14.2 11.7 - 14.9 %    Platelets 903 (L) 135 - 440 K/CU MM    MPV 9.7 7.5 - 11.1 FL   D-dimer, quantitative    Collection Time: 01/13/20  3:03 PM   Result Value Ref Range    D-Dimer, Quant 1048 (H) <230 NG/mL(DDU)   Basic metabolic panel    Collection Time: 01/13/20  3:03 PM   Result Value Ref Range    Sodium 131 (L) 135 - 145 MMOL/L    Potassium 4.3 3.5 - 5.1 MMOL/L    Chloride 97 (L) 99 - 110 mMol/L    CO2 20 (L) 21 - 32 MMOL/L    Anion Gap 14 4 - 16    BUN 11 6 - 23 MG/DL    CREATININE 0.8 0.6 - 1.1 MG/DL    Glucose 107 (H) 70 - 99 MG/DL    Calcium 9.6 8.3 - 10.6 MG/DL    GFR Non-African American >60 >60 mL/min/1.73m2    GFR African American >60 >60 mL/min/1.73m2   Culture Blood #1    Collection Time: 01/13/20  3:03 PM   Result Value Ref Range    Specimen BLOOD     Special Requests NONE     Culture (A)      METHICILLIN RESISTANT STAPHYLOCOCCUS AUREUS DETECTED BY PCR SENSITIVITY IN PROGRESS    Culture       (NOTE) CALLED TO E3 T. 12.5 - 16.0 GM/DL    Hematocrit 35.8 (L) 37 - 47 %    MCV 84.8 78 - 100 FL    MCH 26.1 (L) 27 - 31 PG    MCHC 30.7 (L) 32.0 - 36.0 %    RDW 14.4 11.7 - 14.9 %    Platelets 973 (L) 526 - 440 K/CU MM    MPV 9.9 7.5 - 11.1 FL   Basic metabolic panel    Collection Time: 01/14/20  3:44 AM   Result Value Ref Range    Sodium 134 (L) 135 - 145 MMOL/L    Potassium 4.5 3.5 - 5.1 MMOL/L    Chloride 101 99 - 110 mMol/L    CO2 22 21 - 32 MMOL/L    Anion Gap 11 4 - 16    BUN 17 6 - 23 MG/DL    CREATININE 1.0 0.6 - 1.1 MG/DL    Glucose 118 (H) 70 - 99 MG/DL    Calcium 9.2 8.3 - 10.6 MG/DL    GFR Non- 58 (L) >60 mL/min/1.73m2    GFR African American >60 >60 mL/min/1.73m2   C-Reactive Protein    Collection Time: 01/14/20  3:44 AM   Result Value Ref Range    CRP, High Sensitivity 496.0 mg/L   Procalcitonin    Collection Time: 01/14/20  3:44 AM   Result Value Ref Range    Procalcitonin 2.57        Diagnosis:  Patient Active Problem List   Diagnosis    Rectal bleeding    Fever    Upper GI bleed    Fibromyalgia    Lupus (systemic lupus erythematosus) (HCC)    Nausea & vomiting    Chest pain    Chronic pancreatitis (HCC)    HTN (hypertension)    Acute pancreatitis    Right-sided chest pain    Super obesity    Absolute anemia    Hypokalemia    Generalized abdominal pain    Pneumonia of both lungs due to infectious organism    Foot ulcer, left (HCC)    SLE (systemic lupus erythematosus related syndrome) ([facility])    Hypoxia    Cellulitis    Pancytopenia (HCC)    Pleurisy    Frequent UTI    Gastroesophageal reflux disease without esophagitis    MRSA cellulitis of left foot    Depression    Fatty liver    Fatty liver disease, nonalcoholic    Arthritis    Bilateral low back pain with left-sided sciatica    Morbid obesity with BMI of 50.0-59.9, adult (HCC)    Intractable vomiting with nausea    Class 3 obesity in adult    Hiatal hernia    Poor intravenous access    Post-operative nausea and vomiting    Status post bariatric surgery    Status post laparoscopic sleeve gastrectomy    Abscess    Dysphagia    Pseudoseizure    Chronic pain syndrome    Drug-seeking/Aberrant behavior    Acute conjunctivitis    Chronic osteomyelitis of left foot with draining sinus (HCC)    Receiving intravenous antibiotic treatment as outpatient    History of bacteremia    Abdominal pain, right upper quadrant    Sepsis due to Pseudomonas species ([address]    Bacteremia due to Pseudomonas    Catheter-related bloodstream infection    Hematemesis with nausea    Hematemesis    Lymph node disorder    Dizziness       Assessment & plan:  Brooke Ni is a very pleasant 44 y.o. female presenting with lower back pain, and all over joints pains. .. Her mediport site is erythematous, probably infected, will remove and culture,   Continue IV Fluids, Pain control, IV Antibiotics. Thank you doctor Alesha Lemus MD very much for your consultation and for the opportunity to take care of Mrs Brooke Ni with you, I'll follow along with you and I'll update you on any new events in her care.    ____________________________________________    Pipe Summers MD, FACS, FICS    1/14/2020  2:12 PM      Patient was seen with total face to face time of 45 minutes. More than 50% of this visit was counseling and education as above in my assessment and plan section of my note.

## 2020-01-14 NOTE — PROGRESS NOTES
Infectious Disease Progress Note  2020   Patient Name: Papa Ray : 1970   Impression  · MRSA bacteremia  ? Remains febrile with rising CRP and Pct  ? 2 sets of blood culture positive  ? MRI thoraco-lumbar spine negative for abscess  ? Probable CRBSI. · Right hand inflammation  · Constipation  · Multi-morbidity: per PMHx obesity, status post gastric sleeve surgery, recurrent emesis and hematemesis  Plan:  · D/c cefepime  · Continue vancomycin  · Surgery consult to remove MediPort  · TTE  · Repeat blood cx in a.m. · Right hand CT      Ongoing Antimicrobial Therapy  Vancomycin -? Completed Antimicrobial Therapy  Cefepime -? History:? Interval history noted. MRSA bacteremia  Continues to have fever. Right hand swelling. Physical Exam:  Vital Signs: /62   Pulse 82   Temp 102.6 °F (39.2 °C) (Oral)   Resp 12   Ht 5' 4\" (1.626 m)   Wt 281 lb 9.6 oz (127.7 kg)   SpO2 98%   BMI 48.34 kg/m²     Gen: alert and oriented X3, no distress  Skin: no stigmata of endocarditis, appears flushed. Wounds: C/D/I  HEMT: AT/NC Oropharynx pink, moist, and without lesions or exudates; dentition in good state of repair  Eyes: PERRLA, EOMI, conjunctiva pink, sclera anicteric. Neck: Supple. Trachea midline. No LAD. Chest: no distress and CTA. Good air movement. Heart: RRR and no MRG. Abd: soft, non-distended, no tenderness, no hepatomegaly. Normoactive bowel sounds. Ext: right hand swelling, no clubbing, cyanosis, or edema  Catheter Site: Right anterior chest wall Mediport without erythema or tenderness  Neuro: Mental status intact. CN 2-12 intact and no focal sensory or motor deficits  Musculoskeletal: Lower lumbar spine tenderness.      Radiologic / Imaging / TESTING  MRI of thoracolumbar spine     Labs:    Recent Results (from the past 24 hour(s))   SPECIMEN REJECTION    Collection Time: 20 12:24 PM   Result Value Ref Range    Rejected Test CBCND     Reason for Rejection UNABLE TO PERFORM TESTING:     Reason for Rejection SPECIMEN QUANTITY NOT SUFFICIENT    CBC    Collection Time: 01/13/20  3:03 PM   Result Value Ref Range    WBC 6.0 4.0 - 10.5 K/CU MM    RBC 4.28 4.2 - 5.4 M/CU MM    Hemoglobin 11.4 (L) 12.5 - 16.0 GM/DL    Hematocrit 38.9 37 - 47 %    MCV 90.9 78 - 100 FL    MCH 26.6 (L) 27 - 31 PG    MCHC 29.3 (L) 32.0 - 36.0 %    RDW 14.2 11.7 - 14.9 %    Platelets 043 (L) 867 - 440 K/CU MM    MPV 9.7 7.5 - 11.1 FL   D-dimer, quantitative    Collection Time: 01/13/20  3:03 PM   Result Value Ref Range    D-Dimer, Quant 1048 (H) <230 NG/mL(DDU)   Basic metabolic panel    Collection Time: 01/13/20  3:03 PM   Result Value Ref Range    Sodium 131 (L) 135 - 145 MMOL/L    Potassium 4.3 3.5 - 5.1 MMOL/L    Chloride 97 (L) 99 - 110 mMol/L    CO2 20 (L) 21 - 32 MMOL/L    Anion Gap 14 4 - 16    BUN 11 6 - 23 MG/DL    CREATININE 0.8 0.6 - 1.1 MG/DL    Glucose 107 (H) 70 - 99 MG/DL    Calcium 9.6 8.3 - 10.6 MG/DL    GFR Non-African American >60 >60 mL/min/1.73m2    GFR African American >60 >60 mL/min/1.73m2   Culture Blood #1    Collection Time: 01/13/20  3:03 PM   Result Value Ref Range    Specimen BLOOD     Special Requests NONE     Culture (A)      METHICILLIN RESISTANT STAPHYLOCOCCUS AUREUS DETECTED BY PCR SENSITIVITY IN PROGRESS    Culture       (NOTE) CALLED TO VIRIDIANA Loya RN ON 1/14 AT 0919 BY [employer] JONATHAN-RB COPIED TO PHARMACY 1/14 [facility]   Procalcitonin    Collection Time: 01/13/20  3:03 PM   Result Value Ref Range    Procalcitonin 0.591    C-Reactive Protein    Collection Time: 01/13/20  3:03 PM   Result Value Ref Range    CRP, High Sensitivity 163.0 mg/L   Sedimentation Rate    Collection Time: 01/13/20  3:03 PM   Result Value Ref Range    Sed Rate 20 0 - 30 MM/HR   Culture Blood #2    Collection Time: 01/13/20  3:26 PM   Result Value Ref Range    Specimen BLOOD     Special Requests Draw from [address] [place] [address]     Culture       (NOTE) CALLED African American >60 >60 mL/min/1.73m2   C-Reactive Protein    Collection Time: 01/14/20  3:44 AM   Result Value Ref Range    CRP, High Sensitivity 496.0 mg/L   Procalcitonin    Collection Time: 01/14/20  3:44 AM   Result Value Ref Range    Procalcitonin 2.57      CULTURE results: Invalid input(s): BLOOD CULTURE,  URINE CULTURE, SURGICAL CULTURE    Diagnosis:  Patient Active Problem List   Diagnosis    Rectal bleeding    Fever    Upper GI bleed    Fibromyalgia    Lupus (systemic lupus erythematosus) (HCC)    Nausea & vomiting    Chest pain    Chronic pancreatitis (HCC)    HTN (hypertension)    Acute pancreatitis    Right-sided chest pain    Super obesity    Absolute anemia    Hypokalemia    Generalized abdominal pain    Pneumonia of both lungs due to infectious organism    Foot ulcer, left ([address]    SLE (systemic lupus erythematosus related syndrome) (HCC)    Hypoxia    Cellulitis    Pancytopenia (HCC)    Pleurisy    Frequent UTI    Gastroesophageal reflux disease without esophagitis    MRSA cellulitis of left foot    Depression    Fatty liver    Fatty liver disease, nonalcoholic    Arthritis    Bilateral low back pain with left-sided sciatica    Morbid obesity with BMI of 50.0-59.9, adult (HCC)    Intractable vomiting with nausea    Class 3 obesity in adult    Hiatal hernia    Poor intravenous access    Post-operative nausea and vomiting    Status post bariatric surgery    Status post laparoscopic sleeve gastrectomy    Abscess    Dysphagia    Pseudoseizure    Chronic pain syndrome    Drug-seeking/Aberrant behavior    Acute conjunctivitis    Chronic osteomyelitis of left foot with draining sinus (HCC)    Receiving intravenous antibiotic treatment as outpatient    History of bacteremia    Abdominal pain, right upper quadrant    Sepsis due to Pseudomonas species ([address]    Bacteremia due to Pseudomonas    Catheter-related bloodstream infection    Hematemesis with nausea  Hematemesis    Lymph node disorder    Dizziness       Active Problems  Active Problems:    Nausea & vomiting    Generalized abdominal pain    Dizziness  Resolved Problems:    * No resolved hospital problems.  *    Electronically signed by: Electronically signed by Missael Martinez MD on 1/14/2020 at 11:44 AM

## 2020-01-14 NOTE — PROGRESS NOTES
Pt's bed alarm sounding, walked into patient room with patient at foot of bed attempting to turn bed alarm off. Educated pt not to turn bed alarm off and that she must call for assistance when getting out of bed due to her being so lethargic. Pt agreeable at this time.      Electronically signed by Prasanna Ryan RN on 1/13/2020 at 11:48 PM

## 2020-01-14 NOTE — CONSULTS
mL, PRN  magnesium hydroxide (MILK OF MAGNESIA) 400 MG/5ML suspension 30 mL, Daily PRN  ondansetron (ZOFRAN) injection 4 mg, Q6H PRN  enoxaparin (LOVENOX) injection 40 mg, Daily  LORazepam (ATIVAN) tablet 0.5 mg, Q12H PRN  dextrose 5 % and 0.45 % NaCl with KCl 20 mEq infusion, Continuous  calcium-vitamin D 500-200 MG-UNIT per tablet 1 tablet, BID       Current Facility-Administered Medications   Medication Dose Route Frequency Provider Last Rate Last Dose    vancomycin (VANCOCIN) intermittent dosing (placeholder)   Other RX Placeholder Nessa Montoya MD        morphine sulfate (PF) injection 4 mg  4 mg Intravenous Q4H PRN Darline Cotto MD   4 mg at 01/14/20 0952    acetaminophen (TYLENOL) tablet 650 mg  650 mg Oral Q6H PRN Darline Cotto MD   650 mg at 01/14/20 0952    diphenhydrAMINE (BENADRYL) injection 50 mg  50 mg Intravenous Q6H PRN MICHAEL Castro - CNP        meclizine (ANTIVERT) tablet 25 mg  25 mg Oral TID Darline Cotto MD   25 mg at 01/14/20 1328    PARoxetine (PAXIL) tablet 40 mg  40 mg Oral Nightly Darline Cotto MD   40 mg at 01/13/20 2026    gabapentin (NEURONTIN) capsule 800 mg  800 mg Oral TID Darline Cotto MD   800 mg at 01/14/20 1328    levothyroxine (SYNTHROID) tablet 125 mcg  125 mcg Oral Daily Darline Cotto MD   125 mcg at 01/14/20 0615    therapeutic multivitamin-minerals 1 tablet  1 tablet Oral Daily Darline Cotto MD   1 tablet at 01/14/20 0943    dicyclomine (BENTYL) tablet 20 mg  20 mg Oral Q6H Darline Cotto MD   20 mg at 01/14/20 1328    atenolol (TENORMIN) tablet 25 mg  25 mg Oral Daily Darline Cotto MD   25 mg at 01/14/20 0943    tiZANidine (ZANAFLEX) tablet 4 mg  4 mg Oral BID Darline Cotto MD   4 mg at 01/14/20 0943    ferrous sulfate tablet 325 mg  325 mg Oral BID Darline Cotto MD   325 mg at 01/14/20 0944    vitamin D CAPS 5,000 Units  5,000 Units Oral Daily Stacy Pretty MD   5,000 Units at 01/14/20 0943    ondansetron (ZOFRAN-ODT) disintegrating tablet 4 mg  4 mg Oral Q8H PRN Darline Cotto MD        oxyCODONE-acetaminophen (PERCOCET) 5-325 MG per tablet 1 tablet  1 tablet Oral Q8H PRN Darline Cotto MD   1 tablet at 01/14/20 0356    promethazine (PHENERGAN) tablet 25 mg  25 mg Oral Q6H PRN Darline Cotto MD   25 mg at 01/13/20 2001    pantoprazole (PROTONIX) tablet 40 mg  40 mg Oral QAM AC Darline Cotto MD   40 mg at 01/14/20 0615    sodium chloride flush 0.9 % injection 10 mL  10 mL Intravenous 2 times per day Antonieta Evangelista MD   10 mL at 01/13/20 1951    sodium chloride flush 0.9 % injection 10 mL  10 mL Intravenous PRN Antonieta Evangelista MD        magnesium hydroxide (MILK OF MAGNESIA) 400 MG/5ML suspension 30 mL  30 mL Oral Daily PRN Darline Cotto MD        ondansetron (ZOFRAN) injection 4 mg  4 mg Intravenous Q6H PRN Darline Cotto MD   4 mg at 01/13/20 1057    enoxaparin (LOVENOX) injection 40 mg  40 mg Subcutaneous Daily Darline Cotto MD   40 mg at 01/14/20 0943    LORazepam (ATIVAN) tablet 0.5 mg  0.5 mg Oral Q12H PRN Darline Cotto MD   0.5 mg at 01/14/20 5135    dextrose 5 % and 0.45 % NaCl with KCl 20 mEq infusion   Intravenous Continuous Antonieta Evangelista MD 50 mL/hr at 01/13/20 1359      calcium-vitamin D 500-200 MG-UNIT per tablet 1 tablet  1 tablet Oral BID WC Antonieta Evangelista MD   1 tablet at 01/14/20 0944     Review of Systems:   · Constitutional: No Fever or Weight Loss   · Eyes: No Decreased Vision  · ENT: No Headaches, Hearing Loss or Vertigo  · Cardiovascular: No chest pain, dyspnea on exertion, palpitations or loss of consciousness  · Respiratory: No cough or wheezing    · Gastrointestinal: No abdominal pain, appetite loss, blood in stools, constipation, diarrhea or heartburn  · Genitourinary: No dysuria, trouble voiding, or hematuria  · Musculoskeletal:  No gait disturbance, weakness or joint complaints  · Integumentary: No rash or deformities noted. Back- No tenderness. Integument:  Warm, Dry, No erythema, No rash. Skin: no rash, no ulcers  Lymphatic:  No lymphadenopathy noted. Neurologic:  Alert & oriented x 3, Normal motor function, Normal sensory function, No focal deficits noted. Psychiatric:  Affect normal, Judgment normal, Mood normal.   Lab Review   Recent Labs     01/14/20  0344   WBC 6.8   HGB 11.0*   HCT 35.8*   *      Recent Labs     01/14/20  0344   *   K 4.5      CO2 22   BUN 17   CREATININE 1.0     No results for input(s): AST, ALT, ALB, BILIDIR, BILITOT, ALKPHOS in the last 72 hours. No results for input(s): TROPONINI in the last 72 hours. Lab Results   Component Value Date    BNP 88 11/26/2010    BNP 68 11/24/2010    BNP 36 10/24/2010     Lab Results   Component Value Date    INR 0.93 01/10/2020    PROTIME 11.2 (L) 01/10/2020         EKG:nsr    Chest Xray:    ECHO:  Labs, echo, meds reviewed  Assessment: 44 y. o.year old with PMH of  has a past medical history of Acid reflux, Anemia, Anxiety, Arthritis, Bleeding ulcer, Bronchitis, Chronic back pain, Chronic pain, COPD (chronic obstructive pulmonary disease) (HCC), Depression, Fibromyalgia, H/O echocardiogram, H/O echocardiogram, Hiatal hernia, History of blood transfusion, [place] (hard of hearing), Hx of cardiac catheterization, Hx of transesophageal echocardiography (PILO) for monitoring, HX OTHER MEDICAL, HX OTHER MEDICAL, Hypertension, Lupus ([address] Morbid obesity ([address] Nausea, Pain management, Pancreatitis chronic, Pneumonia, Shortness of breath on exertion, Shortness of breath on exertion, Sleep apnea, Staph infection, Staph infection, Teeth missing, Thyroid disease, UTI (urinary tract infection), and Wears glasses. Recommendations:    1. POSSIBLE INFECTED MED PORT. FOR PILO tomorrow  2. Pancreastitis, recommend treatment  3.  Health maintenance: exerise and diet  All labs, medications and tests reviewed, continue all other medications of all above medical condition listed as is.          Ynes Medina MD, 1/14/2020 3:48 PM

## 2020-01-14 NOTE — PROGRESS NOTES
No edema bilaterally. No DVT signs ,    Neurologic:  Alert and oriented , no focal sensory/motor deficits , grossly non-focal.        Labs:   Recent Labs     01/12/20  0504 01/13/20  1503 01/14/20  0344   WBC 8.4 6.0 6.8   HGB 11.9* 11.4* 11.0*   HCT 38.2 38.9 35.8*    131* 124*     Recent Labs     01/13/20  1503 01/14/20  0344   * 134*   K 4.3 4.5   CL 97* 101   CO2 20* 22   BUN 11 17   CREATININE 0.8 1.0   CALCIUM 9.6 9.2     No results for input(s): AST, ALT, BILIDIR, BILITOT, ALKPHOS in the last 72 hours. No results for input(s): INR in the last 72 hours. No results for input(s): [place] in the last 72 hours. Assessment/Plan:    Active Hospital Problems    Diagnosis Date Noted    Dizziness [R42] 01/09/2020    Generalized abdominal pain [R10.84] 09/26/2015    Nausea & vomiting [R11.2] 04/19/2013   bacteremia MRSA       Tele   IVF , advance diet  . . stable  H/H   CXR and lumbar XR: . Both non acute    Lumbar MRI : non acute . .. blood c/s: pending . . 2 D echo : no concern   . .. plan for PILO   Hand CT : non acute   Blood c/s: MRSA . Sharon Siskin ABX IV . Sharon Siskin plan to take port out   Symptomatic treatment Zofran, Phenergan PRN. ...h/h stable . Pain control Morphine, percocet   Blood culture :pending   Brain MRI/MRA : both with no concern . .pt still dizzy with double vision . ...  neuro input noted . . plan to D/C when headache controlled   Antivert TID   Home meds   DVT proph     Diet: DIET GENERAL;  Code Status: Full Code    Treatment progress and plan was d/w pt/family .         Gabrielle Trinidad MD

## 2020-01-15 ENCOUNTER — ANESTHESIA EVENT (OUTPATIENT)
Dept: OPERATING ROOM | Age: 50
DRG: 315 | End: 2020-01-15
Payer: COMMERCIAL

## 2020-01-15 ENCOUNTER — ANESTHESIA (OUTPATIENT)
Dept: OPERATING ROOM | Age: 50
DRG: 315 | End: 2020-01-15
Payer: COMMERCIAL

## 2020-01-15 VITALS — DIASTOLIC BLOOD PRESSURE: 80 MMHG | SYSTOLIC BLOOD PRESSURE: 148 MMHG | OXYGEN SATURATION: 100 %

## 2020-01-15 LAB
ANION GAP SERPL CALCULATED.3IONS-SCNC: 12 MMOL/L (ref 4–16)
BUN BLDV-MCNC: 18 MG/DL (ref 6–23)
CALCIUM SERPL-MCNC: 9.3 MG/DL (ref 8.3–10.6)
CHLORIDE BLD-SCNC: 97 MMOL/L (ref 99–110)
CO2: 22 MMOL/L (ref 21–32)
CREAT SERPL-MCNC: 0.9 MG/DL (ref 0.6–1.1)
CULTURE: ABNORMAL
CULTURE: ABNORMAL
CULTURE: NORMAL
CULTURE: NORMAL
DOSE AMOUNT: NORMAL
DOSE TIME: NORMAL
GFR AFRICAN AMERICAN: >60 ML/MIN/1.73M2
GFR NON-AFRICAN AMERICAN: >60 ML/MIN/1.73M2
GLUCOSE BLD-MCNC: 177 MG/DL (ref 70–99)
Lab: ABNORMAL
Lab: NORMAL
Lab: NORMAL
POTASSIUM SERPL-SCNC: 4.3 MMOL/L (ref 3.5–5.1)
SODIUM BLD-SCNC: 131 MMOL/L (ref 135–145)
SPECIMEN: ABNORMAL
SPECIMEN: NORMAL
SPECIMEN: NORMAL
VANCOMYCIN TROUGH: 12.3 UG/ML (ref 10–20)

## 2020-01-15 PROCEDURE — 05HC33Z INSERTION OF INFUSION DEVICE INTO LEFT BASILIC VEIN, PERCUTANEOUS APPROACH: ICD-10-PCS | Performed by: FAMILY MEDICINE

## 2020-01-15 PROCEDURE — 6360000002 HC RX W HCPCS: Performed by: SURGERY

## 2020-01-15 PROCEDURE — 99232 SBSQ HOSP IP/OBS MODERATE 35: CPT | Performed by: SURGERY

## 2020-01-15 PROCEDURE — 2500000003 HC RX 250 WO HCPCS: Performed by: SURGERY

## 2020-01-15 PROCEDURE — 87147 CULTURE TYPE IMMUNOLOGIC: CPT

## 2020-01-15 PROCEDURE — 87077 CULTURE AEROBIC IDENTIFY: CPT

## 2020-01-15 PROCEDURE — 3700000000 HC ANESTHESIA ATTENDED CARE: Performed by: SURGERY

## 2020-01-15 PROCEDURE — 76937 US GUIDE VASCULAR ACCESS: CPT

## 2020-01-15 PROCEDURE — 2580000003 HC RX 258: Performed by: NURSE ANESTHETIST, CERTIFIED REGISTERED

## 2020-01-15 PROCEDURE — 3700000001 HC ADD 15 MINUTES (ANESTHESIA): Performed by: SURGERY

## 2020-01-15 PROCEDURE — 94761 N-INVAS EAR/PLS OXIMETRY MLT: CPT

## 2020-01-15 PROCEDURE — 87186 SC STD MICRODIL/AGAR DIL: CPT

## 2020-01-15 PROCEDURE — C1769 GUIDE WIRE: HCPCS

## 2020-01-15 PROCEDURE — 6360000002 HC RX W HCPCS: Performed by: NURSE ANESTHETIST, CERTIFIED REGISTERED

## 2020-01-15 PROCEDURE — 87205 SMEAR GRAM STAIN: CPT

## 2020-01-15 PROCEDURE — 87073 CULTURE BACTERIA ANAEROBIC: CPT

## 2020-01-15 PROCEDURE — 36589 REMOVAL TUNNELED CV CATH: CPT | Performed by: SURGERY

## 2020-01-15 PROCEDURE — 6360000002 HC RX W HCPCS: Performed by: FAMILY MEDICINE

## 2020-01-15 PROCEDURE — 2500000003 HC RX 250 WO HCPCS: Performed by: NURSE ANESTHETIST, CERTIFIED REGISTERED

## 2020-01-15 PROCEDURE — 3600000002 HC SURGERY LEVEL 2 BASE: Performed by: SURGERY

## 2020-01-15 PROCEDURE — 6370000000 HC RX 637 (ALT 250 FOR IP): Performed by: FAMILY MEDICINE

## 2020-01-15 PROCEDURE — 6360000002 HC RX W HCPCS: Performed by: INTERNAL MEDICINE

## 2020-01-15 PROCEDURE — C1751 CATH, INF, PER/CENT/MIDLINE: HCPCS

## 2020-01-15 PROCEDURE — 3600000012 HC SURGERY LEVEL 2 ADDTL 15MIN: Performed by: SURGERY

## 2020-01-15 PROCEDURE — 87071 CULTURE AEROBIC QUANT OTHER: CPT

## 2020-01-15 PROCEDURE — 1200000000 HC SEMI PRIVATE

## 2020-01-15 PROCEDURE — 2709999900 HC NON-CHARGEABLE SUPPLY: Performed by: SURGERY

## 2020-01-15 PROCEDURE — 36410 VNPNXR 3YR/> PHY/QHP DX/THER: CPT

## 2020-01-15 PROCEDURE — 0XP603Z REMOVAL OF INFUSION DEVICE FROM RIGHT UPPER EXTREMITY, OPEN APPROACH: ICD-10-PCS | Performed by: SURGERY

## 2020-01-15 RX ORDER — VANCOMYCIN HYDROCHLORIDE 1 G/200ML
1000 INJECTION, SOLUTION INTRAVENOUS ONCE
Status: COMPLETED | OUTPATIENT
Start: 2020-01-15 | End: 2020-01-15

## 2020-01-15 RX ORDER — LIDOCAINE HYDROCHLORIDE 10 MG/ML
5 INJECTION, SOLUTION EPIDURAL; INFILTRATION; INTRACAUDAL; PERINEURAL ONCE
Status: DISCONTINUED | OUTPATIENT
Start: 2020-01-15 | End: 2020-01-17 | Stop reason: SDUPTHER

## 2020-01-15 RX ORDER — SODIUM CHLORIDE 0.9 % (FLUSH) 0.9 %
10 SYRINGE (ML) INJECTION PRN
Status: DISCONTINUED | OUTPATIENT
Start: 2020-01-15 | End: 2020-01-23 | Stop reason: HOSPADM

## 2020-01-15 RX ORDER — HYDROMORPHONE HCL 110MG/55ML
0.5 PATIENT CONTROLLED ANALGESIA SYRINGE INTRAVENOUS
Status: DISCONTINUED | OUTPATIENT
Start: 2020-01-15 | End: 2020-01-15 | Stop reason: SDUPTHER

## 2020-01-15 RX ORDER — KETAMINE HYDROCHLORIDE 10 MG/ML
INJECTION, SOLUTION INTRAMUSCULAR; INTRAVENOUS PRN
Status: DISCONTINUED | OUTPATIENT
Start: 2020-01-15 | End: 2020-01-15 | Stop reason: SDUPTHER

## 2020-01-15 RX ORDER — HYDROMORPHONE HCL 110MG/55ML
1 PATIENT CONTROLLED ANALGESIA SYRINGE INTRAVENOUS
Status: DISCONTINUED | OUTPATIENT
Start: 2020-01-15 | End: 2020-01-15 | Stop reason: SDUPTHER

## 2020-01-15 RX ORDER — MIDAZOLAM HYDROCHLORIDE 1 MG/ML
INJECTION INTRAMUSCULAR; INTRAVENOUS PRN
Status: DISCONTINUED | OUTPATIENT
Start: 2020-01-15 | End: 2020-01-15 | Stop reason: SDUPTHER

## 2020-01-15 RX ORDER — HYDROMORPHONE HCL 110MG/55ML
1 PATIENT CONTROLLED ANALGESIA SYRINGE INTRAVENOUS
Status: DISCONTINUED | OUTPATIENT
Start: 2020-01-15 | End: 2020-01-15

## 2020-01-15 RX ORDER — VANCOMYCIN HYDROCHLORIDE 1 G/200ML
1000 INJECTION, SOLUTION INTRAVENOUS EVERY 8 HOURS
Status: DISCONTINUED | OUTPATIENT
Start: 2020-01-15 | End: 2020-01-16

## 2020-01-15 RX ORDER — SODIUM CHLORIDE 0.9 % (FLUSH) 0.9 %
10 SYRINGE (ML) INJECTION EVERY 12 HOURS SCHEDULED
Status: DISCONTINUED | OUTPATIENT
Start: 2020-01-15 | End: 2020-01-23 | Stop reason: HOSPADM

## 2020-01-15 RX ORDER — HYDROMORPHONE HCL 110MG/55ML
0.5 PATIENT CONTROLLED ANALGESIA SYRINGE INTRAVENOUS
Status: DISCONTINUED | OUTPATIENT
Start: 2020-01-15 | End: 2020-01-17

## 2020-01-15 RX ORDER — SODIUM CHLORIDE, SODIUM LACTATE, POTASSIUM CHLORIDE, CALCIUM CHLORIDE 600; 310; 30; 20 MG/100ML; MG/100ML; MG/100ML; MG/100ML
INJECTION, SOLUTION INTRAVENOUS CONTINUOUS PRN
Status: DISCONTINUED | OUTPATIENT
Start: 2020-01-15 | End: 2020-01-15 | Stop reason: SDUPTHER

## 2020-01-15 RX ADMIN — Medication 5000 UNITS: at 15:15

## 2020-01-15 RX ADMIN — ATENOLOL 25 MG: 25 TABLET ORAL at 15:17

## 2020-01-15 RX ADMIN — DICYCLOMINE HYDROCHLORIDE 20 MG: 20 TABLET ORAL at 02:16

## 2020-01-15 RX ADMIN — VANCOMYCIN HYDROCHLORIDE 1000 MG: 1 INJECTION, SOLUTION INTRAVENOUS at 17:02

## 2020-01-15 RX ADMIN — MECLIZINE HYDROCHLORIDE 25 MG: 25 TABLET ORAL at 21:15

## 2020-01-15 RX ADMIN — VANCOMYCIN HYDROCHLORIDE 1000 MG: 1 INJECTION, SOLUTION INTRAVENOUS at 02:16

## 2020-01-15 RX ADMIN — GABAPENTIN 800 MG: 400 CAPSULE ORAL at 15:16

## 2020-01-15 RX ADMIN — PROMETHAZINE HYDROCHLORIDE 25 MG: 25 TABLET ORAL at 15:16

## 2020-01-15 RX ADMIN — HYDROMORPHONE HYDROCHLORIDE 0.5 MG: 2 INJECTION, SOLUTION INTRAMUSCULAR; INTRAVENOUS; SUBCUTANEOUS at 21:31

## 2020-01-15 RX ADMIN — MORPHINE SULFATE 4 MG: 4 INJECTION, SOLUTION INTRAMUSCULAR; INTRAVENOUS at 05:40

## 2020-01-15 RX ADMIN — MIDAZOLAM 1 MG: 1 INJECTION INTRAMUSCULAR; INTRAVENOUS at 13:00

## 2020-01-15 RX ADMIN — MIDAZOLAM 1 MG: 1 INJECTION INTRAMUSCULAR; INTRAVENOUS at 12:58

## 2020-01-15 RX ADMIN — MECLIZINE HYDROCHLORIDE 25 MG: 25 TABLET ORAL at 15:17

## 2020-01-15 RX ADMIN — SODIUM CHLORIDE, POTASSIUM CHLORIDE, SODIUM LACTATE AND CALCIUM CHLORIDE: 600; 310; 30; 20 INJECTION, SOLUTION INTRAVENOUS at 12:52

## 2020-01-15 RX ADMIN — OXYCODONE HYDROCHLORIDE AND ACETAMINOPHEN 1 TABLET: 5; 325 TABLET ORAL at 15:17

## 2020-01-15 RX ADMIN — LORAZEPAM 0.5 MG: 0.5 TABLET ORAL at 00:19

## 2020-01-15 RX ADMIN — ACETAMINOPHEN 650 MG: 325 TABLET ORAL at 15:16

## 2020-01-15 RX ADMIN — FERROUS SULFATE TAB 325 MG (65 MG ELEMENTAL FE) 325 MG: 325 (65 FE) TAB at 21:15

## 2020-01-15 RX ADMIN — KETAMINE HYDROCHLORIDE 30 MG: 10 INJECTION INTRAMUSCULAR; INTRAVENOUS at 12:58

## 2020-01-15 RX ADMIN — MULTIPLE VITAMINS W/ MINERALS TAB 1 TABLET: TAB at 15:16

## 2020-01-15 RX ADMIN — HYDROMORPHONE HYDROCHLORIDE 0.5 MG: 2 INJECTION, SOLUTION INTRAMUSCULAR; INTRAVENOUS; SUBCUTANEOUS at 11:42

## 2020-01-15 RX ADMIN — KETAMINE HYDROCHLORIDE 10 MG: 10 INJECTION INTRAMUSCULAR; INTRAVENOUS at 13:03

## 2020-01-15 RX ADMIN — ENOXAPARIN SODIUM 40 MG: 40 INJECTION SUBCUTANEOUS at 15:18

## 2020-01-15 RX ADMIN — GABAPENTIN 800 MG: 400 CAPSULE ORAL at 21:15

## 2020-01-15 RX ADMIN — DICYCLOMINE HYDROCHLORIDE 20 MG: 20 TABLET ORAL at 21:15

## 2020-01-15 RX ADMIN — MORPHINE SULFATE 4 MG: 4 INJECTION, SOLUTION INTRAMUSCULAR; INTRAVENOUS at 00:19

## 2020-01-15 RX ADMIN — LORAZEPAM 0.5 MG: 0.5 TABLET ORAL at 15:17

## 2020-01-15 RX ADMIN — DICYCLOMINE HYDROCHLORIDE 20 MG: 20 TABLET ORAL at 15:16

## 2020-01-15 RX ADMIN — TIZANIDINE 4 MG: 4 TABLET ORAL at 21:15

## 2020-01-15 RX ADMIN — PAROXETINE HYDROCHLORIDE 40 MG: 20 TABLET, FILM COATED ORAL at 21:15

## 2020-01-15 RX ADMIN — OYSTER SHELL CALCIUM WITH VITAMIN D 1 TABLET: 500; 200 TABLET, FILM COATED ORAL at 17:02

## 2020-01-15 ASSESSMENT — PULMONARY FUNCTION TESTS
PIF_VALUE: 0
PIF_VALUE: 1
PIF_VALUE: 0
PIF_VALUE: 1
PIF_VALUE: 0
PIF_VALUE: 1
PIF_VALUE: 1
PIF_VALUE: 0
PIF_VALUE: 1
PIF_VALUE: 0
PIF_VALUE: 1
PIF_VALUE: 0
PIF_VALUE: 1
PIF_VALUE: 0
PIF_VALUE: 1
PIF_VALUE: 1
PIF_VALUE: 0
PIF_VALUE: 1

## 2020-01-15 ASSESSMENT — PAIN SCALES - GENERAL
PAINLEVEL_OUTOF10: 8
PAINLEVEL_OUTOF10: 6
PAINLEVEL_OUTOF10: 8
PAINLEVEL_OUTOF10: 10
PAINLEVEL_OUTOF10: 10
PAINLEVEL_OUTOF10: 8
PAINLEVEL_OUTOF10: 4
PAINLEVEL_OUTOF10: 10
PAINLEVEL_OUTOF10: 8
PAINLEVEL_OUTOF10: 10
PAINLEVEL_OUTOF10: 10
PAINLEVEL_OUTOF10: 0

## 2020-01-15 ASSESSMENT — ENCOUNTER SYMPTOMS: SHORTNESS OF BREATH: 1

## 2020-01-15 ASSESSMENT — COPD QUESTIONNAIRES: CAT_SEVERITY: MODERATE

## 2020-01-15 NOTE — ANESTHESIA POSTPROCEDURE EVALUATION
Department of Anesthesiology  Postprocedure Note    Patient: Suraj Corrigan  MRN: [id number]  YOB: 1970  Date of evaluation: 1/15/2020  Time:  1:30 PM     Procedure Summary     Date:  01/15/20 Room / Location:  [address]    Anesthesia Start:  1252 Anesthesia Stop:      Procedure:  PORT REMOVAL (N/A ) Diagnosis:  (.)    Surgeon:  Glennie Epley, MD Responsible Provider:  Suzanne Velasco MD    Anesthesia Type:  regional, MAC ASA Status:  5 - Emergent          Anesthesia Type: No value filed. Anay Phase I:      Anay Phase II:      Last vitals: Reviewed and per EMR flowsheets.        Anesthesia Post Evaluation    Patient location during evaluation: bedside  Level of consciousness: awake and alert  Pain score: 0  Airway patency: patent  Nausea & Vomiting: no nausea and no vomiting  Complications: no  Cardiovascular status: blood pressure returned to baseline and hemodynamically stable  Respiratory status: acceptable, room air and spontaneous ventilation  Hydration status: euvolemic

## 2020-01-15 NOTE — ANESTHESIA PRE PROCEDURE
BARIATRIC ADVANTAGE MULTI-FORMULA, CHEW TAB) Take 1 tablet by mouth daily 2/22/19   Jun Paniagua MD   Calcium Citrate-Vitamin D (CALCIUM + VIT D, BARIATRIC ADVANTAGE, CHEWABLE TABLET) Take 1 tablet by mouth 2 times daily 2/22/19   Jun Paniagua MD   levothyroxine (SYNTHROID) 125 MCG tablet Take 1 tablet by mouth Daily  Patient taking differently: Take 125 mcg by mouth nightly  8/8/18   Glendon Kayser, MD   gabapentin (NEURONTIN) 800 MG tablet Take 800 mg by mouth 3 times daily    Historical Provider, MD   PARoxetine (PAXIL) 30 MG tablet Take 40 mg by mouth nightly     Historical Provider, MD       Current medications:    Current Facility-Administered Medications   Medication Dose Route Frequency Provider Last Rate Last Dose    vancomycin (VANCOCIN) 1000 mg in dextrose 5% 200 mL IVPB  1,000 mg Intravenous Q8H Bruno Cooley MD        lidocaine PF 1 % injection 5 mL  5 mL Intradermal Once Jun Paniagua MD        sodium chloride flush 0.9 % injection 10 mL  10 mL Intravenous 2 times per day Jun Paniagua MD        sodium chloride flush 0.9 % injection 10 mL  10 mL Intravenous PRN Jun Paniagua MD        lidocaine PF 1 % injection 5 mL  5 mL Intradermal Once Darlene Velázquez MD        sodium chloride flush 0.9 % injection 10 mL  10 mL Intravenous 2 times per day Darlene Velázquez MD        sodium chloride flush 0.9 % injection 10 mL  10 mL Intravenous PRN Darlene Velázquez MD        HYDROmorphone (DILAUDID) injection 0.5 mg  0.5 mg Intravenous Q3H PRN Jun Paniagua MD        Or    HYDROmorphone (DILAUDID) injection 1 mg  1 mg Intravenous Q3H PRN Jun Paniagua MD        acetaminophen (TYLENOL) tablet 650 mg  650 mg Oral Q6H PRN Darline Cotto MD   650 mg at 01/14/20 1603    diphenhydrAMINE (BENADRYL) injection 50 mg  50 mg Intravenous Q6H PRN MICHAEL Ceron - CNP        meclizine (ANTIVERT) tablet 25 mg  25 mg Oral TID Antonieta Evangelista MD   25 mg at 01/14/20 MD Kirti 50 mL/hr at 01/14/20 2006      calcium-vitamin D 500-200 MG-UNIT per tablet 1 tablet  1 tablet Oral BID  Aureliano Alexander MD   Stopped at 01/15/20 1034       Allergies:     Allergies   Allergen Reactions    Aspirin Palpitations     \"My Heart Rate Elevates\"    Compazine [Prochlorperazine Maleate] Rash    Reglan [Metoclopramide Hcl] Rash    Shellfish-Derived Products Swelling    Toradol [Ketorolac Tromethamine] Rash       Problem List:    Patient Active Problem List   Diagnosis Code    Rectal bleeding K62.5    Fever R50.9    Upper GI bleed K92.2    Fibromyalgia M79.7    Lupus (systemic lupus erythematosus) ([facility]) M32.9    Nausea & vomiting R11.2    Chest pain R07.9    Chronic pancreatitis ([facility]) K86.1    HTN (hypertension) I10    Acute pancreatitis K85.90    Right-sided chest pain R07.9    Super obesity E66.9    Absolute anemia D64.9    Hypokalemia E87.6    Generalized abdominal pain R10.84    Pneumonia of both lungs due to infectious organism J18.9    Foot ulcer, left ([facility]) L97.529    SLE (systemic lupus erythematosus related syndrome) ([facility]) M32.9    Hypoxia R09.02    Cellulitis L03.90    Pancytopenia ([facility]) D61.818    Pleurisy R09.1    Frequent UTI N39.0    Gastroesophageal reflux disease without esophagitis K21.9    MRSA cellulitis of left foot L03.116, B95.62    Depression F32.9    Fatty liver K76.0    Fatty liver disease, nonalcoholic K82.7    Arthritis M19.90    Bilateral low back pain with left-sided sciatica M54.42    Morbid obesity with BMI of 50.0-59.9, adult ([facility]) E66.01, Z68.43    Intractable vomiting with nausea R11.2    Class 3 obesity in adult [id number]    Hiatal hernia K44.9    Poor intravenous access Z78.9    Post-operative nausea and vomiting R11.2, Z98.890    Status post bariatric surgery Z98.84    Status post laparoscopic sleeve gastrectomy Z98.84    Abscess L02.91    Dysphagia R13.10    Pseudoseizure F44.5    Chronic pain syndrome G89.4    infection in the past- then put picc line in and removed 8/7/2019 now going to put new mediport in\"( 8/15/2019)    Hypertension     follow with Dr ? name    Lupus [facility]) Dx 2013    \"Rheumatoid Lupus\"    Morbid obesity ([address]     Nausea     \"Most Of The Time\"    Pain management     Sees Dr. Cheko Vidal, Once A Month    Pancreatitis chronic Dx 2001    Pneumonia Dx 11-15    Shortness of breath on exertion     Shortness of breath on exertion     Sleep apnea     Has CPAP\"no longer use the cpap since lost weight\"    Staph infection Dx 11-15    Left Foot    Staph infection Dx 11-15    \"Left Foot\"    Teeth missing     Upper And Lower    Thyroid disease     UTI (urinary tract infection) In Past    No Current Symptoms    Wears glasses     To Read       Past Surgical History:        Procedure Laterality Date    APPENDECTOMY  02/1998    Done When Tubes And Ovaries Were Removed    CARDIAC CATHETERIZATION  10/24/2010    CATHETER REMOVAL N/A 7/16/2019    PORT REMOVAL performed by Sophie Finley MD at [address]  08/27/1991    CHOLECYSTECTOMY, LAPAROSCOPIC  1990's    COLONOSCOPY  Last Done In 2000's    DENTAL SURGERY      Teeth Extracted In Past    ENDOSCOPY, COLON, DIAGNOSTIC  Last Done In 2018    FOOT DEBRIDEMENT Left 6/16/2019    FIRST METATARSAL DEBRIDEMENT INCISION AND DRAINAGE. EXCISION OF ULCER.  RESECTION OF BONE. 1ST METATARSAL POWER LAVAGE AND BONE CEMENT performed by Keenan Hinson DPM at [address] Left Last Done In 2016     Dr. Garrison Dickey, \" About 6 Surgeries Done Because Of Staph Infection\"    HIATAL HERNIA REPAIR N/A 2/12/2019    HERNIA HIATAL LAPAROSCOPIC ROBOTIC performed by Sophie Finley MD at [address]  10/1997    Partial Abdominal Hysterectomy    INSERTION / REMOVAL / REPLACEMENT VENOUS ACCESS CATHETER N/A 8/15/2019    PORT INSERTION performed by Sophie Finley MD at [address]  ~2000    removed after 6 months    LUNG REMOVAL, PARTIAL Left 2008    Benign    OTHER SURGICAL HISTORY  02/1998    \"Tubes And Ovaries Removed, Appendectomy Also Done\"    OTHER SURGICAL HISTORY  Last Done 7-15-16    Mediport Insertion \"Total Of Six Done, Removed Last Mediport In 2014\"    SLEEVE GASTRECTOMY N/A 2/12/2019    GASTRECTOMY SLEEVE LAPAROSCOPIC ROBOTIC performed by Tashi Zimmer MD at [address] [facility]  08/27/2018    UPPER GASTROINTESTINAL ENDOSCOPY N/A 4/2/2019    EGD CONTROL HEMORRHAGE with epi injection at bleeding site performed by Tashi Zimmer MD at [address] [place] [address] 6/19/2019    EGD DIAGNOSTIC ONLY performed by Tashi Zimmer MD at [address] [place] [address] N/A 7/22/2019    EGD DIAGNOSTIC ONLY performed by Francesca Major MD at [address] [place] [address] N/A 10/14/2019    EGD DIAGNOSTIC ONLY performed by Tashi Zimmer MD at [facility] History:    Social History     Tobacco Use    Smoking status: Never Smoker    Smokeless tobacco: Never Used   Substance Use Topics    Alcohol use: No     Alcohol/week: 0.0 standard drinks                                Counseling given: Not Answered      Vital Signs (Current):   Vitals:    01/14/20 2000 01/15/20 0200 01/15/20 0859 01/15/20 1134   BP: 100/85 102/65 138/68 (!) 114/94   Pulse: 61 64 83 86   Resp: 18 17 26 19   Temp: 36.8 °C (98.3 °F) 37.6 °C (99.7 °F)  37.7 °C (99.9 °F)   TempSrc: Oral Oral  Oral   SpO2: 98% 98% 95% 96%   Weight:  282 lb 12.8 oz (128.3 kg)     Height:                                                  BP Readings from Last 3 Encounters:   01/15/20 (!) 114/94   12/04/19 112/74   12/04/19 118/72       NPO Status: Time of last liquid consumption: 1800                        Time of last solid consumption: 1800                        Date of last liquid consumption: 01/14/20 Hrs      ROS comment: ECHO:  Summary   Left ventricular systolic function is normal.   Ejection fraction is visually estimated at 55%. Severely dilated left atrium. No evidence of any pericardial effusion. Technically difficult study due to constant patient movement. No obvious evidence of endocarditis, however, endocarditis cannot be ruled   out. Signature      ------------------------------------------------------------------   Electronically signed by Tucker Drummond MD   (Interpreting physician) on 01/13/2020 at 05:26 PM   ------------------------------------------------------------------    ECG:  Normal sinus rhythm   Normal ECG   When compared with ECG of 31-OCT-2019 13:37,   No significant change was found   Confirmed by [facility] MD, [facility] ([id number]) on 1/10/2020 12:39:50 PM     Neuro/Psych:   (+) neuromuscular disease ( Fibromyalgia):, depression/anxiety             GI/Hepatic/Renal:   (+) hiatal hernia, GERD:, PUD, liver disease:, morbid obesity         ROS comment: Chronic pancreatitis ([address] Endo/Other:    (+) hypothyroidism: arthritis:., .                  ROS comment: Lupus (systemic lupus erythematosus) (HCC) Abdominal:   (+) obese,         Vascular:                                        Anesthesia Plan      regional and MAC     ASA 5 - emergent       Induction: intravenous. MIPS: Prophylactic antiemetics administered. Anesthetic plan and risks discussed with patient. Plan discussed with CRNA.     Attending anesthesiologist reviewed and agrees with Pre Eval content              MICHAEL Gomez - CRNA   1/15/2020

## 2020-01-15 NOTE — PROGRESS NOTES
[address] [facility]  consulted by Dr. Gaylen Meckel for monitoring and adjustment. Indication for treatment: r/o bacteremia, endocarditis, osteomyelitis   Goal trough: 15 mcg/mL     Pertinent Laboratory Values:   Temp Readings from Last 3 Encounters:   01/15/20 99.7 °F (37.6 °C) (Oral)   10/31/19 98.1 °F (36.7 °C) (Oral)   10/16/19 97.8 °F (36.6 °C) (Oral)     Recent Labs     01/13/20  1503 01/14/20  0344 01/14/20  2337   WBC 6.0 6.8 5.7     Recent Labs     01/13/20  1503 01/14/20  0344 01/14/20  2337   BUN 11 17 18   CREATININE 0.8 1.0 0.9     Estimated Creatinine Clearance: 98 mL/min (based on SCr of 0.9 mg/dL). No intake or output data in the 24 hours ending 01/15/20 0656    Pertinent Cultures:  Date    Source    Results  1/13   Blood    Sent  1/10   Blood    NGTD    Vancomycin level:   TROUGH:    Recent Labs     01/14/20  2337   [address] 12.3     RANDOM:  No results for input(s): VANCORANDOM in the last 72 hours. Assessment:  · WBC and temperature: WNL  · SCr, BUN, and urine output: Scr trending down  · Day(s) of therapy: 3   · Vancomycin level: 12.3 mcg/ml -  therapeutic (12 hours post-dose)    Plan:  · Continue vancomycin 1000 mg q8h IVPB  · Monitor renal function closely  · Repeat vancomycin trough on 01/16 @ 0130  · Pharmacy will continue to monitor patient and adjust therapy as indicated    Thank you for the consult.   Pernell Andrews RPh  1/15/2020 6:56 AM

## 2020-01-15 NOTE — PROGRESS NOTES
The PILO scheduled for this morning is to be rescheduled. PICC team is present in the [address] [place] [address]. They have conferred with Dr. Trell Blandon and are inserting a mid-line instead of PICC line. Floor nurse was notified that PILO was not done. She confirmed patient is to be transferred to OR from here.

## 2020-01-15 NOTE — PROGRESS NOTES
Pt back from [facility] stating she is in an intense amount of pain crying and screaming that she needed help. This nurse received her from transport and walked her to the bathroom where she continued to cry and state how the pain was shooting down her legs, pt walked back to the bed and this nurse attempted to make this pt comfortable. Bt continued to cry and scream out stating she is going to die because of being in such pain and that she was going to call Dr Luis Sood and have herself taken out of here. The charge nurse Xiomy Syed educated her on the side effects that she has experienced with the use of just the morphine which has been hallucinations, hypotension, decreased heart rate, and lethargy which are extremely dangerous for her. Pt stated she does not hallucinate and become disoriented in which we told her how she was last night and the things she was saying. She then became very defensive stating she doesn't mean to and that we are looking at her like she is a drug user in which we assured we were not but just trying to consider her over all health. This pt does have an order for dilaudid which was ordered and waiting for approval. This nurse did leave to get the 0.5 mg of dilaudid in which the pt immediatly was satisfied with and stopped screaming out and crying. By the time this nurse returned with the dilaudid this pt was completely calm in the bed like nothing had ever happened. Again this raises concern for drug seeking behavior especially considering pt is steady and ambulates easily on her own.

## 2020-01-15 NOTE — PROGRESS NOTES
GENERAL SURGERY PROGRESS NOTE    CC/HPI:           Patient feels the same. Vitals:    01/14/20 0941 01/14/20 1533 01/14/20 2000 01/15/20 0200   BP: 128/62 (!) 126/98 100/85 102/65   Pulse: 82 78 61 64   Resp: 12 17 18 17   Temp: 102.6 °F (39.2 °C) 101.4 °F (38.6 °C) 98.3 °F (36.8 °C) 99.7 °F (37.6 °C)   TempSrc: Oral Oral Oral Oral   SpO2: 98%  98% 98%   Weight:    282 lb 12.8 oz (128.3 kg)   Height:         No intake/output data recorded. No intake/output data recorded.     Diet NPO, After Midnight    Recent Results (from the past 50 hour(s))   SPECIMEN REJECTION    Collection Time: 01/13/20 12:24 PM   Result Value Ref Range    Rejected Test CBCND     Reason for Rejection UNABLE TO PERFORM TESTING:     Reason for Rejection SPECIMEN QUANTITY NOT SUFFICIENT    CBC    Collection Time: 01/13/20  3:03 PM   Result Value Ref Range    WBC 6.0 4.0 - 10.5 K/CU MM    RBC 4.28 4.2 - 5.4 M/CU MM    Hemoglobin 11.4 (L) 12.5 - 16.0 GM/DL    Hematocrit 38.9 37 - 47 %    MCV 90.9 78 - 100 FL    MCH 26.6 (L) 27 - 31 PG    MCHC 29.3 (L) 32.0 - 36.0 %    RDW 14.2 11.7 - 14.9 %    Platelets 769 (L) 158 - 440 K/CU MM    MPV 9.7 7.5 - 11.1 FL   D-dimer, quantitative    Collection Time: 01/13/20  3:03 PM   Result Value Ref Range    D-Dimer, Quant 1048 (H) <230 NG/mL(DDU)   Basic metabolic panel    Collection Time: 01/13/20  3:03 PM   Result Value Ref Range    Sodium 131 (L) 135 - 145 MMOL/L    Potassium 4.3 3.5 - 5.1 MMOL/L    Chloride 97 (L) 99 - 110 mMol/L    CO2 20 (L) 21 - 32 MMOL/L    Anion Gap 14 4 - 16    BUN 11 6 - 23 MG/DL    CREATININE 0.8 0.6 - 1.1 MG/DL    Glucose 107 (H) 70 - 99 MG/DL    Calcium 9.6 8.3 - 10.6 MG/DL    GFR Non-African American >60 >60 mL/min/1.73m2    GFR African American >60 >60 mL/min/1.73m2   Culture Blood #1    Collection Time: 01/13/20  3:03 PM   Result Value Ref Range    Specimen BLOOD     Special Requests NONE     Culture (A)      METHICILLIN RESISTANT STAPHYLOCOCCUS AUREUS DETECTED BY

## 2020-01-16 ENCOUNTER — APPOINTMENT (OUTPATIENT)
Dept: GENERAL RADIOLOGY | Age: 50
DRG: 315 | End: 2020-01-16
Payer: COMMERCIAL

## 2020-01-16 LAB
ANION GAP SERPL CALCULATED.3IONS-SCNC: 12 MMOL/L (ref 4–16)
BUN BLDV-MCNC: 12 MG/DL (ref 6–23)
CALCIUM SERPL-MCNC: 9.5 MG/DL (ref 8.3–10.6)
CHLORIDE BLD-SCNC: 99 MMOL/L (ref 99–110)
CO2: 24 MMOL/L (ref 21–32)
CREAT SERPL-MCNC: 0.6 MG/DL (ref 0.6–1.1)
CULTURE: ABNORMAL
CULTURE: ABNORMAL
DOSE AMOUNT: NORMAL
DOSE TIME: NORMAL
GFR AFRICAN AMERICAN: >60 ML/MIN/1.73M2
GFR NON-AFRICAN AMERICAN: >60 ML/MIN/1.73M2
GLUCOSE BLD-MCNC: 108 MG/DL (ref 70–99)
HCT VFR BLD CALC: 30.8 % (ref 37–47)
HEMOGLOBIN: 9.7 GM/DL (ref 12.5–16)
HIGH SENSITIVE C-REACTIVE PROTEIN: 292.1 MG/L
LV EF: 55 %
LVEF MODALITY: NORMAL
Lab: ABNORMAL
MCH RBC QN AUTO: 26.4 PG (ref 27–31)
MCHC RBC AUTO-ENTMCNC: 31.5 % (ref 32–36)
MCV RBC AUTO: 83.7 FL (ref 78–100)
PDW BLD-RTO: 14.1 % (ref 11.7–14.9)
PLATELET # BLD: 131 K/CU MM (ref 140–440)
PMV BLD AUTO: 10.5 FL (ref 7.5–11.1)
POTASSIUM SERPL-SCNC: 4.5 MMOL/L (ref 3.5–5.1)
PROCALCITONIN: 0.99
RBC # BLD: 3.68 M/CU MM (ref 4.2–5.4)
SODIUM BLD-SCNC: 135 MMOL/L (ref 135–145)
SPECIMEN: ABNORMAL
VANCOMYCIN TROUGH: 10 UG/ML (ref 10–20)
WBC # BLD: 5 K/CU MM (ref 4–10.5)

## 2020-01-16 PROCEDURE — 80202 ASSAY OF VANCOMYCIN: CPT

## 2020-01-16 PROCEDURE — 73130 X-RAY EXAM OF HAND: CPT

## 2020-01-16 PROCEDURE — B24BZZ4 ULTRASONOGRAPHY OF HEART WITH AORTA, TRANSESOPHAGEAL: ICD-10-PCS | Performed by: OBSTETRICS & GYNECOLOGY

## 2020-01-16 PROCEDURE — 86141 C-REACTIVE PROTEIN HS: CPT

## 2020-01-16 PROCEDURE — 85027 COMPLETE CBC AUTOMATED: CPT

## 2020-01-16 PROCEDURE — 2580000003 HC RX 258: Performed by: SURGERY

## 2020-01-16 PROCEDURE — 87040 BLOOD CULTURE FOR BACTERIA: CPT

## 2020-01-16 PROCEDURE — 7100000000 HC PACU RECOVERY - FIRST 15 MIN

## 2020-01-16 PROCEDURE — 6360000002 HC RX W HCPCS: Performed by: SURGERY

## 2020-01-16 PROCEDURE — 1200000000 HC SEMI PRIVATE

## 2020-01-16 PROCEDURE — 6370000000 HC RX 637 (ALT 250 FOR IP): Performed by: SURGERY

## 2020-01-16 PROCEDURE — 93312 ECHO TRANSESOPHAGEAL: CPT

## 2020-01-16 PROCEDURE — 99024 POSTOP FOLLOW-UP VISIT: CPT | Performed by: SURGERY

## 2020-01-16 PROCEDURE — 80048 BASIC METABOLIC PNL TOTAL CA: CPT

## 2020-01-16 PROCEDURE — 2580000003 HC RX 258: Performed by: INTERNAL MEDICINE

## 2020-01-16 PROCEDURE — 93312 ECHO TRANSESOPHAGEAL: CPT | Performed by: INTERNAL MEDICINE

## 2020-01-16 PROCEDURE — 36415 COLL VENOUS BLD VENIPUNCTURE: CPT

## 2020-01-16 PROCEDURE — 93325 DOPPLER ECHO COLOR FLOW MAPG: CPT | Performed by: INTERNAL MEDICINE

## 2020-01-16 PROCEDURE — 7100000001 HC PACU RECOVERY - ADDTL 15 MIN

## 2020-01-16 PROCEDURE — 99233 SBSQ HOSP IP/OBS HIGH 50: CPT | Performed by: INTERNAL MEDICINE

## 2020-01-16 PROCEDURE — 6360000002 HC RX W HCPCS: Performed by: INTERNAL MEDICINE

## 2020-01-16 PROCEDURE — 84145 PROCALCITONIN (PCT): CPT

## 2020-01-16 RX ADMIN — GABAPENTIN 800 MG: 400 CAPSULE ORAL at 14:12

## 2020-01-16 RX ADMIN — PANTOPRAZOLE SODIUM 40 MG: 40 TABLET, DELAYED RELEASE ORAL at 05:52

## 2020-01-16 RX ADMIN — PAROXETINE HYDROCHLORIDE 40 MG: 20 TABLET, FILM COATED ORAL at 20:17

## 2020-01-16 RX ADMIN — ATENOLOL 25 MG: 25 TABLET ORAL at 12:41

## 2020-01-16 RX ADMIN — GABAPENTIN 800 MG: 400 CAPSULE ORAL at 20:17

## 2020-01-16 RX ADMIN — TIZANIDINE 4 MG: 4 TABLET ORAL at 20:17

## 2020-01-16 RX ADMIN — VANCOMYCIN HYDROCHLORIDE 1250 MG: 5 INJECTION, POWDER, LYOPHILIZED, FOR SOLUTION INTRAVENOUS at 14:12

## 2020-01-16 RX ADMIN — FERROUS SULFATE TAB 325 MG (65 MG ELEMENTAL FE) 325 MG: 325 (65 FE) TAB at 20:17

## 2020-01-16 RX ADMIN — VANCOMYCIN HYDROCHLORIDE 1250 MG: 5 INJECTION, POWDER, LYOPHILIZED, FOR SOLUTION INTRAVENOUS at 20:16

## 2020-01-16 RX ADMIN — MECLIZINE HYDROCHLORIDE 25 MG: 25 TABLET ORAL at 14:12

## 2020-01-16 RX ADMIN — OXYCODONE HYDROCHLORIDE AND ACETAMINOPHEN 1 TABLET: 5; 325 TABLET ORAL at 01:26

## 2020-01-16 RX ADMIN — FERROUS SULFATE TAB 325 MG (65 MG ELEMENTAL FE) 325 MG: 325 (65 FE) TAB at 12:40

## 2020-01-16 RX ADMIN — MECLIZINE HYDROCHLORIDE 25 MG: 25 TABLET ORAL at 20:17

## 2020-01-16 RX ADMIN — LEVOTHYROXINE SODIUM 125 MCG: 125 TABLET ORAL at 05:52

## 2020-01-16 RX ADMIN — HYDROMORPHONE HYDROCHLORIDE 0.5 MG: 2 INJECTION, SOLUTION INTRAMUSCULAR; INTRAVENOUS; SUBCUTANEOUS at 23:14

## 2020-01-16 RX ADMIN — DICYCLOMINE HYDROCHLORIDE 20 MG: 20 TABLET ORAL at 14:12

## 2020-01-16 RX ADMIN — SODIUM CHLORIDE, PRESERVATIVE FREE 10 ML: 5 INJECTION INTRAVENOUS at 20:18

## 2020-01-16 RX ADMIN — HYDROMORPHONE HYDROCHLORIDE 0.5 MG: 2 INJECTION, SOLUTION INTRAMUSCULAR; INTRAVENOUS; SUBCUTANEOUS at 02:34

## 2020-01-16 RX ADMIN — ONDANSETRON 4 MG: 4 TABLET, ORALLY DISINTEGRATING ORAL at 12:41

## 2020-01-16 RX ADMIN — Medication 5000 UNITS: at 12:40

## 2020-01-16 RX ADMIN — SODIUM CHLORIDE, PRESERVATIVE FREE 10 ML: 5 INJECTION INTRAVENOUS at 20:05

## 2020-01-16 RX ADMIN — LORAZEPAM 0.5 MG: 0.5 TABLET ORAL at 01:26

## 2020-01-16 RX ADMIN — MULTIPLE VITAMINS W/ MINERALS TAB 1 TABLET: TAB at 12:40

## 2020-01-16 RX ADMIN — OXYCODONE HYDROCHLORIDE AND ACETAMINOPHEN 1 TABLET: 5; 325 TABLET ORAL at 18:16

## 2020-01-16 RX ADMIN — DICYCLOMINE HYDROCHLORIDE 20 MG: 20 TABLET ORAL at 20:17

## 2020-01-16 RX ADMIN — HYDROMORPHONE HYDROCHLORIDE 0.5 MG: 2 INJECTION, SOLUTION INTRAMUSCULAR; INTRAVENOUS; SUBCUTANEOUS at 20:05

## 2020-01-16 RX ADMIN — HYDROMORPHONE HYDROCHLORIDE 0.5 MG: 2 INJECTION, SOLUTION INTRAMUSCULAR; INTRAVENOUS; SUBCUTANEOUS at 12:41

## 2020-01-16 RX ADMIN — DICYCLOMINE HYDROCHLORIDE 20 MG: 20 TABLET ORAL at 01:26

## 2020-01-16 RX ADMIN — HYDROMORPHONE HYDROCHLORIDE 0.5 MG: 2 INJECTION, SOLUTION INTRAMUSCULAR; INTRAVENOUS; SUBCUTANEOUS at 05:53

## 2020-01-16 RX ADMIN — VANCOMYCIN HYDROCHLORIDE 1000 MG: 1 INJECTION, SOLUTION INTRAVENOUS at 04:49

## 2020-01-16 ASSESSMENT — PAIN SCALES - GENERAL
PAINLEVEL_OUTOF10: 9
PAINLEVEL_OUTOF10: 9
PAINLEVEL_OUTOF10: 10
PAINLEVEL_OUTOF10: 8
PAINLEVEL_OUTOF10: 0
PAINLEVEL_OUTOF10: 6
PAINLEVEL_OUTOF10: 0
PAINLEVEL_OUTOF10: 8
PAINLEVEL_OUTOF10: 10
PAINLEVEL_OUTOF10: 9
PAINLEVEL_OUTOF10: 6

## 2020-01-16 ASSESSMENT — PAIN DESCRIPTION - LOCATION
LOCATION: BACK;LEG
LOCATION: LEG

## 2020-01-16 ASSESSMENT — PAIN DESCRIPTION - ONSET: ONSET: ON-GOING

## 2020-01-16 ASSESSMENT — PAIN DESCRIPTION - FREQUENCY: FREQUENCY: CONTINUOUS

## 2020-01-16 ASSESSMENT — PAIN DESCRIPTION - DESCRIPTORS: DESCRIPTORS: CONSTANT

## 2020-01-16 ASSESSMENT — PAIN DESCRIPTION - PAIN TYPE
TYPE: CHRONIC PAIN
TYPE: CHRONIC PAIN

## 2020-01-16 NOTE — PROGRESS NOTES
GENERAL SURGERY PROGRESS NOTE    CC/HPI:           Patient feels better from yesterday's surgery. Vitals:    01/16/20 1055 01/16/20 1058 01/16/20 1059 01/16/20 1100   BP: (!) 101/54 (!) 95/48 109/63 (!) 90/55   Pulse: 60 61 61 72   Resp: 24 26 22 28   Temp:       TempSrc:       SpO2: 96% 95% 97% 98%   Weight:       Height:         I/O last 3 completed shifts: In: 640 [P.O.:240; I.V.:400]  Out: 10 [Blood:10]  No intake/output data recorded. DIET LOW FAT; Recent Results (from the past 48 hour(s))   Vancomycin, trough    Collection Time: 01/14/20 11:37 PM   Result Value Ref Range    Vancomycin Tr 12.3 10 - 20 UG/ML    DOSE AMOUNT DOSE AMT. GIVEN - =     DOSE TIME DOSE TIME GIVEN - 0000    CBC    Collection Time: 01/14/20 11:37 PM   Result Value Ref Range    WBC 5.7 4.0 - 10.5 K/CU MM    RBC 3.74 (L) 4.2 - 5.4 M/CU MM    Hemoglobin 9.8 (L) 12.5 - 16.0 GM/DL    Hematocrit 33.1 (L) 37 - 47 %    MCV 88.5 78 - 100 FL    MCH 26.2 (L) 27 - 31 PG    MCHC 29.6 (L) 32.0 - 36.0 %    RDW 14.2 11.7 - 14.9 %    Platelets 076 (L) 220 - 440 K/CU MM    MPV 11.0 7.5 - 11.1 FL   Basic metabolic panel    Collection Time: 01/14/20 11:37 PM   Result Value Ref Range    Sodium 131 (L) 135 - 145 MMOL/L    Potassium 4.3 3.5 - 5.1 MMOL/L    Chloride 97 (L) 99 - 110 mMol/L    CO2 22 21 - 32 MMOL/L    Anion Gap 12 4 - 16    BUN 18 6 - 23 MG/DL    CREATININE 0.9 0.6 - 1.1 MG/DL    Glucose 177 (H) 70 - 99 MG/DL    Calcium 9.3 8.3 - 10.6 MG/DL    GFR Non-African American >60 >60 mL/min/1.73m2    GFR African American >60 >60 mL/min/1.73m2   Surgical Culture    Collection Time: 01/15/20  2:00 PM   Result Value Ref Range    Specimen UNKNOWN [facility]     Special Requests fluid from [facility] site for culture     Culture CULTURE IN PROGRESS    Vancomycin, trough    Collection Time: 01/16/20  3:45 AM   Result Value Ref Range    Vancomycin Tr 10.0 10 - 20 UG/ML    DOSE AMOUNT DOSE AMT.  GIVEN - 1000 mg     DOSE TIME DOSE TIME GIVEN -

## 2020-01-16 NOTE — PROGRESS NOTES
[address]  consulted by Dr. Santiago Cardenas for monitoring and adjustment. Indication for treatment: CRBSI- MRSA   Goal trough: 15 mcg/mL     Pertinent Laboratory Values:   Temp Readings from Last 3 Encounters:   01/16/20 99.2 °F (37.3 °C) (Oral)   10/31/19 98.1 °F (36.7 °C) (Oral)   10/16/19 97.8 °F (36.6 °C) (Oral)     Recent Labs     01/14/20  0344 01/14/20  2337 01/16/20  0345   WBC 6.8 5.7 5.0     Recent Labs     01/14/20  0344 01/14/20  2337 01/16/20  0345   BUN 17 18 12   CREATININE 1.0 0.9 0.6     Estimated Creatinine Clearance: 147 mL/min (based on SCr of 0.6 mg/dL). Intake/Output Summary (Last 24 hours) at 1/16/2020 0907  Last data filed at 1/15/2020 2118  Gross per 24 hour   Intake 640 ml   Output 10 ml   Net 630 ml       Pertinent Cultures:  Date    Source    Results  1/10   Blood    NGTD  1/13   Blood (2 of 2)   MRSA   1/15   Medi-Port   Sent  1/16   Blood    Sent    Vancomycin level:   TROUGH:    Recent Labs     01/14/20  2337 01/16/20  0345   VANCOTROUGH 12.3 10.0     RANDOM:  No results for input(s): VANCORANDOM in the last 72 hours. Assessment:  · WBC and temperature: WNL  · SCr, BUN, and urine output: stable  · Day(s) of therapy: 4   · Vancomycin level:   · 12.3 mcg/ml - (12 hours post-dose on 1000 mg q8h IVPB)  · 10.0 mcg/ml -  (7 hours post-dose on 1000 mg q8h IVPB)     Plan:  · Increase dose to vancomycin 1250 mg q8h IVPB   · Repeat trough in 48h   · Pharmacy will continue to monitor patient and adjust therapy as indicated    Thank you for the consult.   Sundar Car RPh  1/16/2020 9:07 AM

## 2020-01-16 NOTE — CONSULTS
to 1 mg IV q.4 h.  p.r.n. in the immediate postoperative period. I believe this could be  discontinued in the next 24 hours. We will continue to follow the  patient with you.         Thong Looney MD    D: 01/15/2020 21:58:02       T: 01/15/2020 23:56:55     [email or web address]  Job#: [id number]     Doc#: [id number]    CC:

## 2020-01-16 NOTE — PROGRESS NOTES
Infectious Disease Progress Note  2020   Patient Name: Mariano Salvador : 1970   Impression  · CRBSI MRSA bacteremia  ? Remains febrile,   ? Vancomycin is yet to reach therapeutic level, but Pct is on downward trend. ? MediPort removed on 1/15/19, pus was seen around the MediPort  ? 2 sets of blood culture positive; blood culture  0/2  ? MRI thoraco-lumbar spine negative for abscess    ? TTE/PILO no vegetations  · Right hand inflammation  ? CT did not reveal any tenosynovitis or abscess. Concerned that with her history of lupus this may be a flareup. · Constipation  · Multi-morbidity: per PMHx obesity, status post gastric sleeve surgery, recurrent emesis and hematemesis  Plan:  · Continue vancomycin, if trough fails to be achieved despite 1250 mg q 8h then daptomycin 10 mg/kg daily should be started. · Envision 2-4 week therapy course after blood cultures become negative. Ongoing Antimicrobial Therapy  Vancomycin -? Completed Antimicrobial Therapy  Cefepime -? History:? Interval history noted. MRSA bacteremia  Continues to have fever, lower back pain with bilateral lower extremity cramps. Physical Exam:  Vital Signs: BP (!) 113/58   Pulse 66   Temp 99.2 °F (37.3 °C) (Oral)   Resp 21   Ht 5' 4\" (1.626 m)   Wt 282 lb 12.8 oz (128.3 kg)   SpO2 97%   BMI 48.54 kg/m²     Gen: alert and oriented X3, no distress  Skin: no stigmata of endocarditis, appears flushed. Wounds: C/D/I  HEMT: AT/NC Oropharynx pink, moist, and without lesions or exudates; dentition in good state of repair  Eyes: PERRLA, EOMI, conjunctiva pink, sclera anicteric. Neck: Supple. Trachea midline. No LAD. Chest: no distress and CTA. Good air movement. Heart: RRR and no MRG. Abd: soft, non-distended, no tenderness, no hepatomegaly. Normoactive bowel sounds. Ext: right hand swelling, no clubbing, cyanosis, or edema  Catheter Site: Left arm PICC line. Neuro: Mental status intact.  CN 2-12 intact to infectious organism    Foot ulcer, left ([facility]    SLE (systemic lupus erythematosus related syndrome) (HCC)    Hypoxia    Cellulitis    Pancytopenia (HCC)    Pleurisy    Frequent UTI    Gastroesophageal reflux disease without esophagitis    MRSA cellulitis of left foot    Depression    Fatty liver    Fatty liver disease, nonalcoholic    Arthritis    Bilateral low back pain with left-sided sciatica    Morbid obesity with BMI of 50.0-59.9, adult (HCC)    Intractable vomiting with nausea    Class 3 obesity in adult    Hiatal hernia    Poor intravenous access    Post-operative nausea and vomiting    Status post bariatric surgery    Status post laparoscopic sleeve gastrectomy    Abscess    Dysphagia    Pseudoseizure    Chronic pain syndrome    Drug-seeking/Aberrant behavior    Acute conjunctivitis    Chronic osteomyelitis of left foot with draining sinus (HCC)    Receiving intravenous antibiotic treatment as outpatient    History of bacteremia    Abdominal pain, right upper quadrant    Sepsis due to Pseudomonas species ([facility]    Bacteremia due to Pseudomonas    Catheter-related bloodstream infection    Hematemesis with nausea    Hematemesis    Lymph node disorder    Dizziness    Abdominal pain, epigastric       Active Problems  Active Problems:    Nausea & vomiting    Generalized abdominal pain    Dizziness    Abdominal pain, epigastric  Resolved Problems:    * No resolved hospital problems.  *    Electronically signed by: Electronically signed by Vlad Tan MD on 1/16/2020 at 8:56 AM

## 2020-01-16 NOTE — CARE COORDINATION
Attempted to see patient and she is out of room for PILO. Will re-attempt. 3:50 PM  Spoke with patient for dc planning. She states she lives at home with her family though will go to her mom's house at discharge. States she has no needs and if Dr. Lisa Guzman" she have [address] her preference is [facility]. CM following.

## 2020-01-17 LAB
ANION GAP SERPL CALCULATED.3IONS-SCNC: 12 MMOL/L (ref 4–16)
BUN BLDV-MCNC: 10 MG/DL (ref 6–23)
CALCIUM SERPL-MCNC: 9.2 MG/DL (ref 8.3–10.6)
CHLORIDE BLD-SCNC: 102 MMOL/L (ref 99–110)
CO2: 20 MMOL/L (ref 21–32)
CREAT SERPL-MCNC: 0.7 MG/DL (ref 0.6–1.1)
GFR AFRICAN AMERICAN: >60 ML/MIN/1.73M2
GFR NON-AFRICAN AMERICAN: >60 ML/MIN/1.73M2
GLUCOSE BLD-MCNC: 115 MG/DL (ref 70–99)
HCT VFR BLD CALC: 32.5 % (ref 37–47)
HEMOGLOBIN: 9.6 GM/DL (ref 12.5–16)
MCH RBC QN AUTO: 26.3 PG (ref 27–31)
MCHC RBC AUTO-ENTMCNC: 29.5 % (ref 32–36)
MCV RBC AUTO: 89 FL (ref 78–100)
PDW BLD-RTO: 14.2 % (ref 11.7–14.9)
PLATELET # BLD: 132 K/CU MM (ref 140–440)
PMV BLD AUTO: 10.8 FL (ref 7.5–11.1)
POTASSIUM SERPL-SCNC: 4.8 MMOL/L (ref 3.5–5.1)
RBC # BLD: 3.65 M/CU MM (ref 4.2–5.4)
SODIUM BLD-SCNC: 134 MMOL/L (ref 135–145)
WBC # BLD: 4.7 K/CU MM (ref 4–10.5)

## 2020-01-17 PROCEDURE — 82565 ASSAY OF CREATININE: CPT

## 2020-01-17 PROCEDURE — 99024 POSTOP FOLLOW-UP VISIT: CPT | Performed by: SURGERY

## 2020-01-17 PROCEDURE — 36415 COLL VENOUS BLD VENIPUNCTURE: CPT

## 2020-01-17 PROCEDURE — 2580000003 HC RX 258: Performed by: FAMILY MEDICINE

## 2020-01-17 PROCEDURE — 97162 PT EVAL MOD COMPLEX 30 MIN: CPT

## 2020-01-17 PROCEDURE — 97530 THERAPEUTIC ACTIVITIES: CPT

## 2020-01-17 PROCEDURE — 80048 BASIC METABOLIC PNL TOTAL CA: CPT

## 2020-01-17 PROCEDURE — 1200000000 HC SEMI PRIVATE

## 2020-01-17 PROCEDURE — 6370000000 HC RX 637 (ALT 250 FOR IP): Performed by: PAIN MEDICINE

## 2020-01-17 PROCEDURE — 6360000002 HC RX W HCPCS: Performed by: SURGERY

## 2020-01-17 PROCEDURE — 2580000003 HC RX 258: Performed by: INTERNAL MEDICINE

## 2020-01-17 PROCEDURE — 6370000000 HC RX 637 (ALT 250 FOR IP): Performed by: FAMILY MEDICINE

## 2020-01-17 PROCEDURE — 85027 COMPLETE CBC AUTOMATED: CPT

## 2020-01-17 PROCEDURE — 6360000002 HC RX W HCPCS: Performed by: INTERNAL MEDICINE

## 2020-01-17 PROCEDURE — 6360000002 HC RX W HCPCS: Performed by: PAIN MEDICINE

## 2020-01-17 PROCEDURE — 6370000000 HC RX 637 (ALT 250 FOR IP): Performed by: SURGERY

## 2020-01-17 RX ORDER — PROMETHAZINE HYDROCHLORIDE 25 MG/ML
12.5 INJECTION, SOLUTION INTRAMUSCULAR; INTRAVENOUS ONCE
Status: COMPLETED | OUTPATIENT
Start: 2020-01-17 | End: 2020-01-17

## 2020-01-17 RX ORDER — LORAZEPAM 1 MG/1
1 TABLET ORAL EVERY 12 HOURS PRN
Status: DISCONTINUED | OUTPATIENT
Start: 2020-01-17 | End: 2020-01-23 | Stop reason: HOSPADM

## 2020-01-17 RX ORDER — OXYCODONE HYDROCHLORIDE AND ACETAMINOPHEN 5; 325 MG/1; MG/1
1 TABLET ORAL EVERY 4 HOURS PRN
Status: DISCONTINUED | OUTPATIENT
Start: 2020-01-17 | End: 2020-01-19

## 2020-01-17 RX ORDER — LIDOCAINE HYDROCHLORIDE 10 MG/ML
5 INJECTION, SOLUTION EPIDURAL; INFILTRATION; INTRACAUDAL; PERINEURAL ONCE
Status: DISCONTINUED | OUTPATIENT
Start: 2020-01-17 | End: 2020-01-23 | Stop reason: HOSPADM

## 2020-01-17 RX ORDER — SODIUM CHLORIDE 0.9 % (FLUSH) 0.9 %
10 SYRINGE (ML) INJECTION EVERY 12 HOURS SCHEDULED
Status: DISCONTINUED | OUTPATIENT
Start: 2020-01-17 | End: 2020-01-23 | Stop reason: HOSPADM

## 2020-01-17 RX ORDER — PROMETHAZINE HYDROCHLORIDE 25 MG/ML
12.5 INJECTION, SOLUTION INTRAMUSCULAR; INTRAVENOUS EVERY 6 HOURS PRN
Status: DISCONTINUED | OUTPATIENT
Start: 2020-01-17 | End: 2020-01-19

## 2020-01-17 RX ORDER — SODIUM CHLORIDE 0.9 % (FLUSH) 0.9 %
10 SYRINGE (ML) INJECTION PRN
Status: DISCONTINUED | OUTPATIENT
Start: 2020-01-17 | End: 2020-01-23 | Stop reason: HOSPADM

## 2020-01-17 RX ORDER — OXYCODONE HYDROCHLORIDE AND ACETAMINOPHEN 5; 325 MG/1; MG/1
1 TABLET ORAL EVERY 6 HOURS PRN
Status: DISCONTINUED | OUTPATIENT
Start: 2020-01-17 | End: 2020-01-17

## 2020-01-17 RX ADMIN — VANCOMYCIN HYDROCHLORIDE 1250 MG: 5 INJECTION, POWDER, LYOPHILIZED, FOR SOLUTION INTRAVENOUS at 05:32

## 2020-01-17 RX ADMIN — Medication 5000 UNITS: at 10:03

## 2020-01-17 RX ADMIN — OXYCODONE HYDROCHLORIDE AND ACETAMINOPHEN 1 TABLET: 5; 325 TABLET ORAL at 04:25

## 2020-01-17 RX ADMIN — GABAPENTIN 800 MG: 400 CAPSULE ORAL at 13:34

## 2020-01-17 RX ADMIN — HYDROMORPHONE HYDROCHLORIDE 0.5 MG: 2 INJECTION, SOLUTION INTRAMUSCULAR; INTRAVENOUS; SUBCUTANEOUS at 05:31

## 2020-01-17 RX ADMIN — PANTOPRAZOLE SODIUM 40 MG: 40 TABLET, DELAYED RELEASE ORAL at 05:32

## 2020-01-17 RX ADMIN — LORAZEPAM 1 MG: 1 TABLET ORAL at 22:15

## 2020-01-17 RX ADMIN — VANCOMYCIN HYDROCHLORIDE 1250 MG: 5 INJECTION, POWDER, LYOPHILIZED, FOR SOLUTION INTRAVENOUS at 21:26

## 2020-01-17 RX ADMIN — MECLIZINE HYDROCHLORIDE 25 MG: 25 TABLET ORAL at 10:04

## 2020-01-17 RX ADMIN — GABAPENTIN 800 MG: 400 CAPSULE ORAL at 22:15

## 2020-01-17 RX ADMIN — DICYCLOMINE HYDROCHLORIDE 20 MG: 20 TABLET ORAL at 22:15

## 2020-01-17 RX ADMIN — TIZANIDINE 4 MG: 4 TABLET ORAL at 10:04

## 2020-01-17 RX ADMIN — PAROXETINE HYDROCHLORIDE 40 MG: 20 TABLET, FILM COATED ORAL at 22:14

## 2020-01-17 RX ADMIN — FERROUS SULFATE TAB 325 MG (65 MG ELEMENTAL FE) 325 MG: 325 (65 FE) TAB at 22:14

## 2020-01-17 RX ADMIN — GABAPENTIN 800 MG: 400 CAPSULE ORAL at 10:05

## 2020-01-17 RX ADMIN — LORAZEPAM 0.5 MG: 0.5 TABLET ORAL at 05:14

## 2020-01-17 RX ADMIN — OXYCODONE AND ACETAMINOPHEN 1 TABLET: 5; 325 TABLET ORAL at 22:14

## 2020-01-17 RX ADMIN — DICYCLOMINE HYDROCHLORIDE 20 MG: 20 TABLET ORAL at 13:34

## 2020-01-17 RX ADMIN — OYSTER SHELL CALCIUM WITH VITAMIN D 1 TABLET: 500; 200 TABLET, FILM COATED ORAL at 10:05

## 2020-01-17 RX ADMIN — MECLIZINE HYDROCHLORIDE 25 MG: 25 TABLET ORAL at 13:34

## 2020-01-17 RX ADMIN — VANCOMYCIN HYDROCHLORIDE 1250 MG: 5 INJECTION, POWDER, LYOPHILIZED, FOR SOLUTION INTRAVENOUS at 13:34

## 2020-01-17 RX ADMIN — MECLIZINE HYDROCHLORIDE 25 MG: 25 TABLET ORAL at 22:14

## 2020-01-17 RX ADMIN — ENOXAPARIN SODIUM 40 MG: 40 INJECTION SUBCUTANEOUS at 10:03

## 2020-01-17 RX ADMIN — TIZANIDINE 4 MG: 4 TABLET ORAL at 22:14

## 2020-01-17 RX ADMIN — OYSTER SHELL CALCIUM WITH VITAMIN D 1 TABLET: 500; 200 TABLET, FILM COATED ORAL at 17:54

## 2020-01-17 RX ADMIN — PROMETHAZINE HYDROCHLORIDE 12.5 MG: 25 INJECTION INTRAMUSCULAR; INTRAVENOUS at 13:33

## 2020-01-17 RX ADMIN — LEVOTHYROXINE SODIUM 125 MCG: 125 TABLET ORAL at 05:32

## 2020-01-17 RX ADMIN — DICYCLOMINE HYDROCHLORIDE 20 MG: 20 TABLET ORAL at 10:04

## 2020-01-17 RX ADMIN — HYDROMORPHONE HYDROCHLORIDE 0.5 MG: 2 INJECTION, SOLUTION INTRAMUSCULAR; INTRAVENOUS; SUBCUTANEOUS at 10:03

## 2020-01-17 RX ADMIN — FERROUS SULFATE TAB 325 MG (65 MG ELEMENTAL FE) 325 MG: 325 (65 FE) TAB at 10:03

## 2020-01-17 RX ADMIN — HYDROMORPHONE HYDROCHLORIDE 0.5 MG: 2 INJECTION, SOLUTION INTRAMUSCULAR; INTRAVENOUS; SUBCUTANEOUS at 13:33

## 2020-01-17 RX ADMIN — MULTIPLE VITAMINS W/ MINERALS TAB 1 TABLET: TAB at 10:04

## 2020-01-17 RX ADMIN — SODIUM CHLORIDE, PRESERVATIVE FREE 10 ML: 5 INJECTION INTRAVENOUS at 21:26

## 2020-01-17 RX ADMIN — DICYCLOMINE HYDROCHLORIDE 20 MG: 20 TABLET ORAL at 01:59

## 2020-01-17 RX ADMIN — OXYCODONE HYDROCHLORIDE AND ACETAMINOPHEN 1 TABLET: 5; 325 TABLET ORAL at 16:54

## 2020-01-17 ASSESSMENT — PAIN SCALES - GENERAL
PAINLEVEL_OUTOF10: 9
PAINLEVEL_OUTOF10: 9
PAINLEVEL_OUTOF10: 10
PAINLEVEL_OUTOF10: 9
PAINLEVEL_OUTOF10: 10
PAINLEVEL_OUTOF10: 0
PAINLEVEL_OUTOF10: 0
PAINLEVEL_OUTOF10: 10
PAINLEVEL_OUTOF10: 10

## 2020-01-17 ASSESSMENT — PAIN DESCRIPTION - LOCATION
LOCATION: GENERALIZED
LOCATION: GENERALIZED

## 2020-01-17 ASSESSMENT — PAIN DESCRIPTION - PAIN TYPE
TYPE: CHRONIC PAIN
TYPE: CHRONIC PAIN

## 2020-01-17 ASSESSMENT — PAIN DESCRIPTION - ORIENTATION
ORIENTATION: LOWER
ORIENTATION: LOWER

## 2020-01-17 NOTE — CARE COORDINATION
Updated per Bsihnu Nava PT who states patient needs a FWW at discharge. PS to Dr. Re Santoro for order. 4:12 PM  Noted PS response to order FWW and it was placed at this time, called and faxed to [facility] ([facility] DME.

## 2020-01-17 NOTE — PROGRESS NOTES
Physical Therapy  Triage d/c order, per charting and discussion with patient RN patient is independent, has denied any needs at d/c and has been mobilizing regularly.

## 2020-01-17 NOTE — CARE COORDINATION
Alexia Cano was evaluated today and a DME order was entered for a wheeled walker because she requires this to successfully complete daily living tasks of eating, bathing, toileting, personal cares, ambulating, grooming, hygiene, dressing upper body, dressing lower body, meal preparation and taking own medications. A wheeled walker is necessary due to the patient's unsteady gait, upper body weakness, and inability to  an ambulation device; and she can ambulate only by pushing a walker instead of a lesser assistive device such as a cane, crutch, or standard walker. The need for this equipment was discussed with the patient and she understands and is in agreement.

## 2020-01-17 NOTE — PROGRESS NOTES
[address] [facility]  consulted by Dr. Te Nobles for monitoring and adjustment. Indication for treatment: CRBSI- MRSA   Goal trough: 15 mcg/mL  Other Antimicrobials:  none     Pertinent Laboratory Values:   Temp Readings from Last 3 Encounters:   01/17/20 99.7 °F (37.6 °C) (Oral)   10/31/19 98.1 °F (36.7 °C) (Oral)   10/16/19 97.8 °F (36.6 °C) (Oral)     Recent Labs     01/14/20  2337 01/16/20  0345 01/17/20  0710   WBC 5.7 5.0 4.7     Recent Labs     01/14/20  2337 01/16/20  0345 01/17/20  0710   BUN 18 12 10   CREATININE 0.9 0.6 0.7     Estimated Creatinine Clearance: 126 mL/min (based on SCr of 0.7 mg/dL). Intake/Output Summary (Last 24 hours) at 1/17/2020 1520  Last data filed at 1/17/2020 0612  Gross per 24 hour   Intake 750 ml   Output --   Net 750 ml       Pertinent Cultures:  Date    Source    Results  1/10   Blood    NGTD  1/13   Blood (2 of 2)   MRSA  1/13                             Resp Panel                             Negative   1/15   Medi-Port   Sent  1/15                             Surgical Cx                             MRSA  1/16   Blood    Sent    Vancomycin level:   TROUGH:    Recent Labs     01/14/20 2337 01/16/20  0345   VANCOTROUGH 12.3 10.0     RANDOM:  No results for input(s): VANCORANDOM in the last 72 hours.     Assessment:  · WBC and temperature:  WBC low normal, pt with a slight fever of 99.7 (Tmax = 103)  · SCr, BUN, and urine output: SCR remains normal, no output data  · Day(s) of therapy: 5   · Vancomycin level:   · 1/14 - 12.3 mcg/ml - (12 hours post-dose on 1000 mg q8h IVPB)  · 1/16 - 10.0 mcg/ml -  (7 hours post-dose on 1000 mg q8h IVPB)     Plan:  · MRSA found in surgical culture and blood  · Infected mediport removed, Midline placed  · Continue with vancomycin 1250 mg q8h IVPB   · Repeat trough on 1/18 @04:30  · Pharmacy will continue to monitor patient and adjust therapy as indicated    VANCOMYCIN TROUGH SCHEDULED FOR 1/18 @04:30    Thank you for

## 2020-01-17 NOTE — PROGRESS NOTES
3.65 (L) 4.2 - 5.4 M/CU MM    Hemoglobin 9.6 (L) 12.5 - 16.0 GM/DL    Hematocrit 32.5 (L) 37 - 47 %    MCV 89.0 78 - 100 FL    MCH 26.3 (L) 27 - 31 PG    MCHC 29.5 (L) 32.0 - 36.0 %    RDW 14.2 11.7 - 14.9 %    Platelets 670 (L) 501 - 440 K/CU MM    MPV 10.8 7.5 - 11.1 FL   Basic metabolic panel    Collection Time: 01/17/20  7:10 AM   Result Value Ref Range    Sodium 134 (L) 135 - 145 MMOL/L    Potassium 4.8 3.5 - 5.1 MMOL/L    Chloride 102 99 - 110 mMol/L    CO2 20 (L) 21 - 32 MMOL/L    Anion Gap 12 4 - 16    BUN 10 6 - 23 MG/DL    CREATININE 0.7 0.6 - 1.1 MG/DL    Glucose 115 (H) 70 - 99 MG/DL    Calcium 9.2 8.3 - 10.6 MG/DL    GFR Non-African American >60 >60 mL/min/1.73m2    GFR African American >60 >60 mL/min/1.73m2       Scheduled Meds:   lidocaine PF  5 mL Intradermal Once    sodium chloride flush  10 mL Intravenous 2 times per day    vancomycin  1,250 mg Intravenous Q8H    sodium chloride flush  10 mL Intravenous 2 times per day    sodium chloride flush  10 mL Intravenous 2 times per day    meclizine  25 mg Oral TID    PARoxetine  40 mg Oral Nightly    gabapentin  800 mg Oral TID    levothyroxine  125 mcg Oral Daily    therapeutic multivitamin-minerals  1 tablet Oral Daily    dicyclomine  20 mg Oral Q6H    atenolol  25 mg Oral Daily    tiZANidine  4 mg Oral BID    ferrous sulfate  325 mg Oral BID    vitamin D  5,000 Units Oral Daily    pantoprazole  40 mg Oral QAM AC    enoxaparin  40 mg Subcutaneous Daily    calcium-vitamin D  1 tablet Oral BID WC       Continuous Infusions:   dextrose 5% and 0.45% NaCl with KCl 20 mEq 50 mL/hr at 01/14/20 2006       Physical Exam:  HEENT: Anicteric sclerae, Oropharyngeal mucosae moist, pink and intact. Heart:  Normal S1 and S2, RRR  Lungs: Clear to auscultation bilaterally, No audible Wheezes or Rales. Extremities: No edema. Neuro: Alert and Oriented x 3, Non focal.  Abdomen: Soft, Benign, Non tender, Non distended, Positive bowel sounds.   Incision: Nicely healing: No new erythema - unchanged, No purulent discharge. Active Problems:    Nausea & vomiting    Generalized abdominal pain    Dizziness    Abdominal pain, epigastric  Resolved Problems:    * No resolved hospital problems. *      Assessment and Plan:  Karma Samano is a 44 y.o. female who is POD # 2 status post:  1) REMOVAL of her 6.6-[place], low profile Mediport from the right   subclavian vein approach. 2) Purulent discharge was sent for aerobic and anaerobic cultures.       Cultures positive for MRSA, recommend consulting ID. Increase Ambulation to at least 4x/day walk in the hallways with assistance. Respiratory cha-operative care: Incentive Spirometry / deep breathing and coughing 10x/hr while awake. Continue DVT prophylaxis with Teds and SCDs and SC Lovenox. Continue GI prophylaxis with Protonix IV till able to tolerate PO. Continue IV Abx.    ___________________________________________    Timothy Ball MD, FACS, FICS  1/17/2020  6:02 PM    Patient was seen with total face to face time of 25 minutes. More than 50% of this visit was counseling and education as above in my assessment and plan section of my note.

## 2020-01-17 NOTE — PROGRESS NOTES
Attending Progress Note      PCP: Richard Deras MD    Patient: Selena Landry   Gender: female  : 1970   Age: 44 y.o. MRN: [id number]      Date of Admission: 2020    Chief Complaint:   Chief Complaint   Patient presents with    Dizziness     onset 3-4 days ago. reports dizziness and blurry vision    Abdominal Pain     hx of pancreatitis, reports right upper abd pain radiating around to her back with N/V/D           Subjective: rt 3 ed finger less swollen today . ... lumbar pian . .. pt is very anxious almost panic . Kenia Hooker asking to be discharged . . will communicate with ID   No chills , no N/V/diarrhea     Medications:  Reviewed  Infusion Medications    dextrose 5% and 0.45% NaCl with KCl 20 mEq 50 mL/hr at 20     Scheduled Medications    lidocaine PF  5 mL Intradermal Once    sodium chloride flush  10 mL Intravenous 2 times per day    vancomycin  1,250 mg Intravenous Q8H    sodium chloride flush  10 mL Intravenous 2 times per day    sodium chloride flush  10 mL Intravenous 2 times per day    meclizine  25 mg Oral TID    PARoxetine  40 mg Oral Nightly    gabapentin  800 mg Oral TID    levothyroxine  125 mcg Oral Daily    therapeutic multivitamin-minerals  1 tablet Oral Daily    dicyclomine  20 mg Oral Q6H    atenolol  25 mg Oral Daily    tiZANidine  4 mg Oral BID    ferrous sulfate  325 mg Oral BID    vitamin D  5,000 Units Oral Daily    pantoprazole  40 mg Oral QAM AC    enoxaparin  40 mg Subcutaneous Daily    calcium-vitamin D  1 tablet Oral BID WC     PRN Meds: oxyCODONE-acetaminophen, sodium chloride flush, LORazepam, sodium chloride flush, sodium chloride flush, acetaminophen, diphenhydrAMINE, ondansetron, promethazine, magnesium hydroxide, ondansetron, LORazepam      Intake/Output Summary (Last 24 hours) at 2020 1525  Last data filed at 2020 0612  Gross per 24 hour   Intake 750 ml   Output --   Net 750 ml       Exam:  /83   Pulse 77   Temp

## 2020-01-17 NOTE — PROGRESS NOTES
Attending Progress Note      PCP: Maximilian Monaco MD    Patient: Vivian Hickman   Gender: female  : 1970   Age: 44 y.o. MRN: [id number]      Date of Admission: 2020    Chief Complaint:   Chief Complaint   Patient presents with    Dizziness     onset 3-4 days ago. reports dizziness and blurry vision    Abdominal Pain     hx of pancreatitis, reports right upper abd pain radiating around to her back with N/V/D           Subjective: rt hand/arm pain and swelling    . ... lumbar pian . ..    No chills , no N/V/diarrhea     Medications:  Reviewed  Infusion Medications    dextrose 5% and 0.45% NaCl with KCl 20 mEq 50 mL/hr at 20     Scheduled Medications    vancomycin  1,250 mg Intravenous Q8H    lidocaine PF  5 mL Intradermal Once    sodium chloride flush  10 mL Intravenous 2 times per day    lidocaine PF  5 mL Intradermal Once    sodium chloride flush  10 mL Intravenous 2 times per day    meclizine  25 mg Oral TID    PARoxetine  40 mg Oral Nightly    gabapentin  800 mg Oral TID    levothyroxine  125 mcg Oral Daily    therapeutic multivitamin-minerals  1 tablet Oral Daily    dicyclomine  20 mg Oral Q6H    atenolol  25 mg Oral Daily    tiZANidine  4 mg Oral BID    ferrous sulfate  325 mg Oral BID    vitamin D  5,000 Units Oral Daily    pantoprazole  40 mg Oral QAM AC    enoxaparin  40 mg Subcutaneous Daily    calcium-vitamin D  1 tablet Oral BID WC     PRN Meds: sodium chloride flush, sodium chloride flush, HYDROmorphone **OR** [DISCONTINUED] HYDROmorphone, acetaminophen, diphenhydrAMINE, ondansetron, oxyCODONE-acetaminophen, promethazine, magnesium hydroxide, ondansetron, LORazepam    No intake or output data in the 24 hours ending 20 6802    Exam:  BP (!) 113/58   Pulse 74   Temp 98.2 °F (36.8 °C) (Oral)   Resp 20   Ht 5' 4\" (1.626 m)   Wt 282 lb 12.8 oz (128.3 kg)   SpO2 98%   BMI 48.54 kg/m²   General appearance: No distress,   Respiratory:  symmetrical

## 2020-01-17 NOTE — CONSULTS
[address] [facility] PHYSICAL THERAPY EVALUATION  Andre Pope, 1970, [id number]/[id number], 2020    History  [place]:  The primary encounter diagnosis was Abdominal pain, epigastric. Diagnoses of Intractable nausea and vomiting and Vision changes were also pertinent to this visit. Patient  has a past medical history of Acid reflux, Anemia, Anxiety, Arthritis, Bleeding ulcer, Bronchitis, Chronic back pain, Chronic pain, COPD (chronic obstructive pulmonary disease) ([address] Depression, Fibromyalgia, H/O echocardiogram, H/O echocardiogram, Hiatal hernia, History of blood transfusion, [place] (hard of hearing), Hx of cardiac catheterization, Hx of transesophageal echocardiography (PILO) for monitoring, HX OTHER MEDICAL, HX OTHER MEDICAL, Hypertension, Lupus ([address] Morbid obesity ([address] Nausea, Pain management, Pancreatitis chronic, Pneumonia, Shortness of breath on exertion, Shortness of breath on exertion, Sleep apnea, Staph infection, Staph infection, Teeth missing, Thyroid disease, UTI (urinary tract infection), and Wears glasses. Patient  has a past surgical history that includes Lung removal, partial (Left, );  section (1991); Foot surgery (Left, Last Done In ); Dental surgery; Cholecystectomy, laparoscopic (3333'H); other surgical history (1998); other surgical history (Last Done 7-15-16); Tonsillectomy and adenoidectomy (); Appendectomy (1998); Upper gastrointestinal endoscopy (2018); Lap Band (~); Hysterectomy (10/1997); Endoscopy, colon, diagnostic (Last Done In ); Colonoscopy (Last Done In 's); Cardiac catheterization (10/24/2010); Sleeve Gastrectomy (N/A, 2019); hiatal hernia repair (N/A, 2019); Upper gastrointestinal endoscopy (N/A, 2019); Foot Debridement (Left, 2019); Upper gastrointestinal endoscopy (N/A, 2019); Catheter Removal (N/A, 2019); Upper gastrointestinal endoscopy (N/A, 2019);  Chris Ring / REMOVAL /

## 2020-01-17 NOTE — CONSULTS
Consult completed. Procedure/rationale explained to pt & consent obtained. Troubleshooting performed to #4fr PowerMidLine to LUE and site patent, returning blood briskly and flushing without any resistance/abnormalities /p sterile dressing change with WinGuard Securing Device, BioPatch, and new LuerLock cap & SwabCap applied. Pt tolerated well and no other c/o or needs noted or reported. Diamond, Primary RN, notified.

## 2020-01-18 LAB
ANION GAP SERPL CALCULATED.3IONS-SCNC: 10 MMOL/L (ref 4–16)
BUN BLDV-MCNC: 7 MG/DL (ref 6–23)
CALCIUM SERPL-MCNC: 9.6 MG/DL (ref 8.3–10.6)
CHLORIDE BLD-SCNC: 102 MMOL/L (ref 99–110)
CO2: 21 MMOL/L (ref 21–32)
CREAT SERPL-MCNC: 0.6 MG/DL (ref 0.6–1.1)
CULTURE: ABNORMAL
DOSE AMOUNT: ABNORMAL
DOSE TIME: ABNORMAL
GFR AFRICAN AMERICAN: >60 ML/MIN/1.73M2
GFR NON-AFRICAN AMERICAN: >60 ML/MIN/1.73M2
GLUCOSE BLD-MCNC: 115 MG/DL (ref 70–99)
HCT VFR BLD CALC: 32.4 % (ref 37–47)
HEMOGLOBIN: 9 GM/DL (ref 12.5–16)
Lab: ABNORMAL
MCH RBC QN AUTO: 26.3 PG (ref 27–31)
MCHC RBC AUTO-ENTMCNC: 27.8 % (ref 32–36)
MCV RBC AUTO: 94.7 FL (ref 78–100)
PDW BLD-RTO: 14.3 % (ref 11.7–14.9)
PLATELET # BLD: 165 K/CU MM (ref 140–440)
PMV BLD AUTO: 10.8 FL (ref 7.5–11.1)
POTASSIUM SERPL-SCNC: 4.3 MMOL/L (ref 3.5–5.1)
RBC # BLD: 3.42 M/CU MM (ref 4.2–5.4)
SODIUM BLD-SCNC: 133 MMOL/L (ref 135–145)
SPECIMEN: ABNORMAL
VANCOMYCIN TROUGH: 23.1 UG/ML (ref 10–20)
WBC # BLD: 4.3 K/CU MM (ref 4–10.5)

## 2020-01-18 PROCEDURE — 82565 ASSAY OF CREATININE: CPT

## 2020-01-18 PROCEDURE — 6370000000 HC RX 637 (ALT 250 FOR IP): Performed by: FAMILY MEDICINE

## 2020-01-18 PROCEDURE — 2580000003 HC RX 258: Performed by: SURGERY

## 2020-01-18 PROCEDURE — 1200000000 HC SEMI PRIVATE

## 2020-01-18 PROCEDURE — 36415 COLL VENOUS BLD VENIPUNCTURE: CPT

## 2020-01-18 PROCEDURE — 6360000002 HC RX W HCPCS: Performed by: SURGERY

## 2020-01-18 PROCEDURE — 99024 POSTOP FOLLOW-UP VISIT: CPT | Performed by: SURGERY

## 2020-01-18 PROCEDURE — 6360000002 HC RX W HCPCS: Performed by: INTERNAL MEDICINE

## 2020-01-18 PROCEDURE — 2580000003 HC RX 258: Performed by: INTERNAL MEDICINE

## 2020-01-18 PROCEDURE — 85027 COMPLETE CBC AUTOMATED: CPT

## 2020-01-18 PROCEDURE — 6370000000 HC RX 637 (ALT 250 FOR IP): Performed by: SURGERY

## 2020-01-18 PROCEDURE — 80048 BASIC METABOLIC PNL TOTAL CA: CPT

## 2020-01-18 PROCEDURE — 80202 ASSAY OF VANCOMYCIN: CPT

## 2020-01-18 RX ORDER — VANCOMYCIN HYDROCHLORIDE 1 G/200ML
1000 INJECTION, SOLUTION INTRAVENOUS EVERY 8 HOURS
Status: DISCONTINUED | OUTPATIENT
Start: 2020-01-19 | End: 2020-01-21

## 2020-01-18 RX ORDER — MECLIZINE HYDROCHLORIDE 25 MG/1
25 TABLET ORAL 3 TIMES DAILY
Qty: 30 TABLET | Refills: 1 | Status: SHIPPED | OUTPATIENT
Start: 2020-01-18 | End: 2020-01-28

## 2020-01-18 RX ADMIN — PANTOPRAZOLE SODIUM 40 MG: 40 TABLET, DELAYED RELEASE ORAL at 05:47

## 2020-01-18 RX ADMIN — VANCOMYCIN HYDROCHLORIDE 1250 MG: 5 INJECTION, POWDER, LYOPHILIZED, FOR SOLUTION INTRAVENOUS at 05:44

## 2020-01-18 RX ADMIN — TIZANIDINE 4 MG: 4 TABLET ORAL at 08:50

## 2020-01-18 RX ADMIN — OXYCODONE AND ACETAMINOPHEN 1 TABLET: 5; 325 TABLET ORAL at 08:49

## 2020-01-18 RX ADMIN — MECLIZINE HYDROCHLORIDE 25 MG: 25 TABLET ORAL at 20:47

## 2020-01-18 RX ADMIN — MECLIZINE HYDROCHLORIDE 25 MG: 25 TABLET ORAL at 13:35

## 2020-01-18 RX ADMIN — VANCOMYCIN HYDROCHLORIDE 1250 MG: 5 INJECTION, POWDER, LYOPHILIZED, FOR SOLUTION INTRAVENOUS at 13:04

## 2020-01-18 RX ADMIN — DICYCLOMINE HYDROCHLORIDE 20 MG: 20 TABLET ORAL at 08:50

## 2020-01-18 RX ADMIN — OXYCODONE AND ACETAMINOPHEN 1 TABLET: 5; 325 TABLET ORAL at 17:08

## 2020-01-18 RX ADMIN — OXYCODONE AND ACETAMINOPHEN 1 TABLET: 5; 325 TABLET ORAL at 21:25

## 2020-01-18 RX ADMIN — FERROUS SULFATE TAB 325 MG (65 MG ELEMENTAL FE) 325 MG: 325 (65 FE) TAB at 20:47

## 2020-01-18 RX ADMIN — ACETAMINOPHEN 650 MG: 325 TABLET ORAL at 20:47

## 2020-01-18 RX ADMIN — PAROXETINE HYDROCHLORIDE 40 MG: 20 TABLET, FILM COATED ORAL at 20:47

## 2020-01-18 RX ADMIN — Medication 5000 UNITS: at 08:49

## 2020-01-18 RX ADMIN — ATENOLOL 25 MG: 25 TABLET ORAL at 08:49

## 2020-01-18 RX ADMIN — GABAPENTIN 800 MG: 400 CAPSULE ORAL at 13:35

## 2020-01-18 RX ADMIN — GABAPENTIN 800 MG: 400 CAPSULE ORAL at 20:47

## 2020-01-18 RX ADMIN — OXYCODONE AND ACETAMINOPHEN 1 TABLET: 5; 325 TABLET ORAL at 13:04

## 2020-01-18 RX ADMIN — OYSTER SHELL CALCIUM WITH VITAMIN D 1 TABLET: 500; 200 TABLET, FILM COATED ORAL at 08:53

## 2020-01-18 RX ADMIN — DICYCLOMINE HYDROCHLORIDE 20 MG: 20 TABLET ORAL at 13:35

## 2020-01-18 RX ADMIN — ENOXAPARIN SODIUM 40 MG: 40 INJECTION SUBCUTANEOUS at 08:49

## 2020-01-18 RX ADMIN — LORAZEPAM 1 MG: 1 TABLET ORAL at 20:47

## 2020-01-18 RX ADMIN — LEVOTHYROXINE SODIUM 125 MCG: 125 TABLET ORAL at 05:47

## 2020-01-18 RX ADMIN — SODIUM CHLORIDE, PRESERVATIVE FREE 10 ML: 5 INJECTION INTRAVENOUS at 20:49

## 2020-01-18 RX ADMIN — ACETAMINOPHEN 650 MG: 325 TABLET ORAL at 00:22

## 2020-01-18 RX ADMIN — MULTIPLE VITAMINS W/ MINERALS TAB 1 TABLET: TAB at 08:50

## 2020-01-18 RX ADMIN — OYSTER SHELL CALCIUM WITH VITAMIN D 1 TABLET: 500; 200 TABLET, FILM COATED ORAL at 17:04

## 2020-01-18 RX ADMIN — DICYCLOMINE HYDROCHLORIDE 20 MG: 20 TABLET ORAL at 20:47

## 2020-01-18 RX ADMIN — OXYCODONE AND ACETAMINOPHEN 1 TABLET: 5; 325 TABLET ORAL at 04:27

## 2020-01-18 RX ADMIN — TIZANIDINE 4 MG: 4 TABLET ORAL at 20:48

## 2020-01-18 RX ADMIN — GABAPENTIN 800 MG: 400 CAPSULE ORAL at 08:49

## 2020-01-18 RX ADMIN — MECLIZINE HYDROCHLORIDE 25 MG: 25 TABLET ORAL at 08:50

## 2020-01-18 RX ADMIN — FERROUS SULFATE TAB 325 MG (65 MG ELEMENTAL FE) 325 MG: 325 (65 FE) TAB at 08:50

## 2020-01-18 ASSESSMENT — PAIN SCALES - GENERAL
PAINLEVEL_OUTOF10: 8
PAINLEVEL_OUTOF10: 3
PAINLEVEL_OUTOF10: 3
PAINLEVEL_OUTOF10: 9
PAINLEVEL_OUTOF10: 8

## 2020-01-18 ASSESSMENT — PAIN DESCRIPTION - FREQUENCY
FREQUENCY: CONTINUOUS
FREQUENCY: CONTINUOUS

## 2020-01-18 ASSESSMENT — PAIN DESCRIPTION - ORIENTATION
ORIENTATION: LEFT;RIGHT
ORIENTATION: RIGHT;LEFT

## 2020-01-18 ASSESSMENT — PAIN DESCRIPTION - LOCATION
LOCATION: LEG
LOCATION: LEG

## 2020-01-18 ASSESSMENT — PAIN DESCRIPTION - ONSET
ONSET: ON-GOING
ONSET: ON-GOING

## 2020-01-18 ASSESSMENT — PAIN DESCRIPTION - PROGRESSION
CLINICAL_PROGRESSION: NOT CHANGED
CLINICAL_PROGRESSION: GRADUALLY WORSENING

## 2020-01-18 ASSESSMENT — PAIN DESCRIPTION - DESCRIPTORS
DESCRIPTORS: CONSTANT
DESCRIPTORS: CONSTANT

## 2020-01-18 ASSESSMENT — PAIN DESCRIPTION - PAIN TYPE
TYPE: CHRONIC PAIN
TYPE: CHRONIC PAIN

## 2020-01-18 NOTE — PROGRESS NOTES
This nurse answered Pt's call light. Upon entering room, the nurse noticed that the IV pump had been turned off by Pt.

## 2020-01-18 NOTE — PROGRESS NOTES
K/CU MM    RBC 3.42 (L) 4.2 - 5.4 M/CU MM    Hemoglobin 9.0 (L) 12.5 - 16.0 GM/DL    Hematocrit 32.4 (L) 37 - 47 %    MCV 94.7 78 - 100 FL    MCH 26.3 (L) 27 - 31 PG    MCHC 27.8 (L) 32.0 - 36.0 %    RDW 14.3 11.7 - 14.9 %    Platelets 693 805 - 359 K/CU MM    MPV 10.8 7.5 - 11.1 FL   Basic metabolic panel    Collection Time: 01/18/20  3:43 AM   Result Value Ref Range    Sodium 133 (L) 135 - 145 MMOL/L    Potassium 4.3 3.5 - 5.1 MMOL/L    Chloride 102 99 - 110 mMol/L    CO2 21 21 - 32 MMOL/L    Anion Gap 10 4 - 16    BUN 7 6 - 23 MG/DL    CREATININE 0.6 0.6 - 1.1 MG/DL    Glucose 115 (H) 70 - 99 MG/DL    Calcium 9.6 8.3 - 10.6 MG/DL    GFR Non-African American >60 >60 mL/min/1.73m2    GFR African American >60 >60 mL/min/1.73m2       Scheduled Meds:   lidocaine PF  5 mL Intradermal Once    sodium chloride flush  10 mL Intravenous 2 times per day    vancomycin  1,250 mg Intravenous Q8H    sodium chloride flush  10 mL Intravenous 2 times per day    sodium chloride flush  10 mL Intravenous 2 times per day    meclizine  25 mg Oral TID    PARoxetine  40 mg Oral Nightly    gabapentin  800 mg Oral TID    levothyroxine  125 mcg Oral Daily    therapeutic multivitamin-minerals  1 tablet Oral Daily    dicyclomine  20 mg Oral Q6H    atenolol  25 mg Oral Daily    tiZANidine  4 mg Oral BID    ferrous sulfate  325 mg Oral BID    vitamin D  5,000 Units Oral Daily    pantoprazole  40 mg Oral QAM AC    enoxaparin  40 mg Subcutaneous Daily    calcium-vitamin D  1 tablet Oral BID WC       Continuous Infusions:   dextrose 5% and 0.45% NaCl with KCl 20 mEq 50 mL/hr at 01/14/20 2006       Physical Exam:  HEENT: Anicteric sclerae, Oropharyngeal mucosae moist, pink and intact. Heart:  Normal S1 and S2, RRR  Lungs: Clear to auscultation bilaterally, No audible Wheezes or Rales. Extremities: No edema.   Neuro: Alert and Oriented x 3, Non focal.  Abdomen: Soft, Benign, Non tender, Non distended, Positive bowel sounds. Incision: Nicely healing: No new erythema - unchanged, No purulent discharge. Active Problems:    Nausea & vomiting    Generalized abdominal pain    Dizziness    Abdominal pain, epigastric  Resolved Problems:    * No resolved hospital problems. *      Assessment and Plan:  Karla Guerrero is a 44 y.o. female who is POD # 3 status post:  1) REMOVAL of her 6.6-[place], low profile Mediport from the right   subclavian vein approach. 2) Purulent discharge was sent for aerobic and anaerobic cultures.       Cultures positive for MRSA, consulted ID, and wants to wait for the results of the cultures before discharge. Increase Ambulation to at least 4x/day walk in the hallways with assistance. Respiratory cha-operative care: Incentive Spirometry / deep breathing and coughing 10x/hr while awake. Continue DVT prophylaxis with Teds and SCDs and SC Lovenox. Continue GI prophylaxis with Protonix IV till able to tolerate PO. Continue IV Abx.    ___________________________________________    Major MD Valentino, FACS, FICS  1/18/2020  11:42 AM    Patient was seen with total face to face time of 25 minutes. More than 50% of this visit was counseling and education as above in my assessment and plan section of my note.

## 2020-01-18 NOTE — PROGRESS NOTES
Spoke with Dr Lyndy Boast about the fact that Case management could not set up home IV infusions on short notice and that it should be able to be set up for tomorrow. We have discontinued the discharge order for now and will re-evaluate tomorrow and the , [place] feels she can get it set up for tomorrow 1/19/2020.

## 2020-01-18 NOTE — PROGRESS NOTES
S1/S2 without murmurs. Abdomen : Soft, non-tender, non-distended  , normal bowel sounds. Legs : No edema bilaterally. No DVT signs ,    Neurologic:  Alert and oriented ,        Labs:   Recent Labs     01/17/20  0710 01/18/20  0343 01/19/20  0750   WBC 4.7 4.3 5.0   HGB 9.6* 9.0* 9.1*   HCT 32.5* 32.4* 31.9*   * 165 248     Recent Labs     01/17/20  0710 01/18/20  0343 01/19/20  0750   * 133* 136   K 4.8 4.3 4.3    102 101   CO2 20* 21 24   BUN 10 7 9   CREATININE 0.7 0.6 0.7   CALCIUM 9.2 9.6 9.4     No results for input(s): AST, ALT, BILIDIR, BILITOT, ALKPHOS in the last 72 hours. No results for input(s): INR in the last 72 hours. No results for input(s): [place] Shall in the last 72 hours. Assessment/Plan:    Active Hospital Problems    Diagnosis Date Noted    Abdominal pain, epigastric [R10.13]     Dizziness [R42] 01/09/2020    Generalized abdominal pain [R10.84] 09/26/2015    Nausea & vomiting [R11.2] 04/19/2013   bacteremia MRSA            Detailed d/w pt about IV ABX, cultures, PT/OT . . and frequent blood woek /lab after d/c . Aureliano Escalante pt asked for Ativan   Tele   Port out 1/15.2020  PILO : no concern   Blood c/s: MRSA . Aureliano Meres ABX IV . Aureliano Isbells RT hand XR : Diffuse soft tissue swelling of the dorsum of the hand of uncertain etiology. [place] Meres less swollen today   Adjust pain meds . [place] Meres Aureliano Meres Pain control ,increase  Percocet to Q 4  , d/c dilaudid  Add ativan PO BID    stable  H/H   Symptomatic treatment Zofran, Phenergan PRN. ...h/h stable .            Previous Images :  Brain MRI/MRA : both with no concern . .pt still dizzy with double vision . ...  neuro input noted . . plan to   CXR and lumbar XR: . Both non acute    Lumbar MRI : non acute . .. blood c/s: pending . . 2 D echo : no concern   . ..  plan for PILO   Hand CT : non acute         Diet: DIET LOW FAT;  Code Status: Full Code     Treatment progress and plan was d/w pt/family .          DVT Prophylaxis:   Diet: DIET LOW FAT;  Code Status: Full Code    Treatment

## 2020-01-18 NOTE — PLAN OF CARE
Problem: Falls - Risk of:  Goal: Will remain free from falls  Description  Will remain free from falls  Outcome: Ongoing  Goal: Absence of physical injury  Description  Absence of physical injury  Outcome: Ongoing     Problem: Pain:  Description  Pain management should include both nonpharmacologic and pharmacologic interventions.   Goal: Pain level will decrease  Description  Pain level will decrease  Outcome: Ongoing  Goal: Control of acute pain  Description  Control of acute pain  Outcome: Ongoing  Goal: Control of chronic pain  Description  Control of chronic pain  Outcome: Ongoing
Problem: Pain:  Goal: Pain level will decrease  Description  Pain level will decrease  Outcome: Ongoing  Goal: Control of acute pain  Description  Control of acute pain  Outcome: Ongoing  Goal: Control of chronic pain  Description  Control of chronic pain  Outcome: Ongoing
Problem: Risk for Impaired Skin Integrity  Goal: Tissue integrity - skin and mucous membranes  Description  Structural intactness and normal physiological function of skin and  mucous membranes.   1/10/2020 0738 by Juliette Fuller RN  Outcome: Ongoing     Problem: Falls - Risk of:  Goal: Will remain free from falls  Description  Will remain free from falls  1/10/2020 0738 by Juliette Fuller RN  Outcome: Ongoing     Problem: Falls - Risk of:  Goal: Absence of physical injury  Description  Absence of physical injury  1/10/2020 0738 by Juliette Fuller RN  Outcome: Ongoing     Problem: Pain:  Goal: Pain level will decrease  Description  Pain level will decrease  1/10/2020 0738 by Juliette Fuller RN  Outcome: Ongoing     Problem: Pain:  Goal: Control of acute pain  Description  Control of acute pain  1/10/2020 0738 by Juliette Fuller RN  Outcome: Ongoing     Problem: Pain:  Goal: Control of chronic pain  Description  Control of chronic pain  1/10/2020 0738 by Juliette Fuller RN  Outcome: Ongoing
Problem: Risk for Impaired Skin Integrity  Goal: Tissue integrity - skin and mucous membranes  Description  Structural intactness and normal physiological function of skin and  mucous membranes.   1/13/2020 1421 by Dane Granados RN  Outcome: Ongoing  1/13/2020 0302 by Rahul Gaines RN  Outcome: Ongoing     Problem: Falls - Risk of:  Goal: Will remain free from falls  Description  Will remain free from falls  1/13/2020 1421 by Dane Granados RN  Outcome: Ongoing  1/13/2020 0302 by Rahul Gaines RN  Outcome: Ongoing  Goal: Absence of physical injury  Description  Absence of physical injury  1/13/2020 1421 by Dane Granados RN  Outcome: Ongoing  1/13/2020 0302 by Rahul Gaines RN  Outcome: Ongoing     Problem: Pain:  Goal: Pain level will decrease  Description  Pain level will decrease  1/13/2020 1421 by Dane Granados RN  Outcome: Ongoing  1/13/2020 0302 by Rahul Gaines RN  Outcome: Ongoing  Goal: Control of acute pain  Description  Control of acute pain  1/13/2020 1421 by Dane Granados RN  Outcome: Ongoing  1/13/2020 0302 by Rahul Gaines RN  Outcome: Ongoing  Goal: Control of chronic pain  Description  Control of chronic pain  1/13/2020 1421 by Dane Granados RN  Outcome: Ongoing  1/13/2020 0302 by Rahul Gaines RN  Outcome: Ongoing
Problem: Risk for Impaired Skin Integrity  Goal: Tissue integrity - skin and mucous membranes  Description  Structural intactness and normal physiological function of skin and  mucous membranes.   1/14/2020 1024 by Ara Curtis RN  Outcome: Ongoing     Problem: Falls - Risk of:  Goal: Will remain free from falls  Description  Will remain free from falls  1/14/2020 1024 by Ara Curtis RN  Outcome: Ongoing     Problem: Falls - Risk of:  Goal: Absence of physical injury  Description  Absence of physical injury  1/14/2020 1024 by Ara Curtis RN  Outcome: Ongoing     Problem: Pain:  Goal: Pain level will decrease  Description  Pain level will decrease  1/14/2020 1024 by Ara Curtis RN  Outcome: Ongoing     Problem: Pain:  Goal: Control of acute pain  Description  Control of acute pain  1/14/2020 1024 by Ara Curtis RN  Outcome: Ongoing     Problem: Pain:  Goal: Control of chronic pain  Description  Control of chronic pain  1/14/2020 1024 by Ara Curtis RN  Outcome: Ongoing
Problem: Risk for Impaired Skin Integrity  Goal: Tissue integrity - skin and mucous membranes  Description  Structural intactness and normal physiological function of skin and  mucous membranes.   5/14/1522 6514 by Yury Swann RN  Outcome: Ongoing  1/16/2020 1036 by Yasmin Taylor RN  Outcome: Ongoing
Problem: Risk for Impaired Skin Integrity  Goal: Tissue integrity - skin and mucous membranes  Description  Structural intactness and normal physiological function of skin and  mucous membranes.   Outcome: Ongoing     Problem: Falls - Risk of:  Goal: Will remain free from falls  Description  Will remain free from falls  1/15/2020 0725 by Mack Rodriguez RN  Outcome: Ongoing     Problem: Falls - Risk of:  Goal: Absence of physical injury  Description  Absence of physical injury  1/15/2020 0725 by Mack Rodriguez RN  Outcome: Ongoing     Problem: Pain:  Goal: Pain level will decrease  Description  Pain level will decrease  1/15/2020 0725 by Mack Rodriguez RN  Outcome: Ongoing     Problem: Pain:  Goal: Control of acute pain  Description  Control of acute pain  1/15/2020 0725 by Mack Rodriguez RN  Outcome: Ongoing     Problem: Pain:  Goal: Control of chronic pain  Description  Control of chronic pain  1/15/2020 0725 by Mack Rodriguez RN  Outcome: Ongoing
Problem: Risk for Impaired Skin Integrity  Goal: Tissue integrity - skin and mucous membranes  Description  Structural intactness and normal physiological function of skin and  mucous membranes.   Outcome: Ongoing     Problem: Falls - Risk of:  Goal: Will remain free from falls  Description  Will remain free from falls  Outcome: Ongoing  Goal: Absence of physical injury  Description  Absence of physical injury  Outcome: Ongoing     Problem: Pain:  Goal: Pain level will decrease  Description  Pain level will decrease  Outcome: Ongoing  Goal: Control of acute pain  Description  Control of acute pain  Outcome: Ongoing  Goal: Control of chronic pain  Description  Control of chronic pain  Outcome: Ongoing
Problem: Risk for Impaired Skin Integrity  Goal: Tissue integrity - skin and mucous membranes  Description  Structural intactness and normal physiological function of skin and  mucous membranes. 1/14/2020 0110 by Sabrina Barrett RN  Outcome: Ongoing  1/13/2020 1421 by Mariaelena Rodriguez RN  Outcome: Ongoing     Problem: Falls - Risk of:  Goal: Will remain free from falls  Description  Will remain free from falls  1/14/2020 0110 by Sabrina Barrett RN  Outcome: Ongoing  1/13/2020 1421 by Mariaelena Rodriguez RN  Outcome: Ongoing  Goal: Absence of physical injury  Description  Absence of physical injury  1/14/2020 0110 by Sabrina Barrett RN  Outcome: Ongoing  1/13/2020 1421 by Mariaelena Rodriguez RN  Outcome: Ongoing     Problem: Pain:  Description  Pain management should include both nonpharmacologic and pharmacologic interventions.   Goal: Pain level will decrease  Description  Pain level will decrease  1/14/2020 0110 by Sabrina Barrett RN  Outcome: Ongoing  1/13/2020 1421 by Mariaelena Rodriguez RN  Outcome: Ongoing  Goal: Control of acute pain  Description  Control of acute pain  1/14/2020 0110 by Sabrina Barrett RN  Outcome: Ongoing  1/13/2020 1421 by Mariaelena Rodriguez RN  Outcome: Ongoing  Goal: Control of chronic pain  Description  Control of chronic pain  1/14/2020 0110 by Sabrina Barrett RN  Outcome: Ongoing  1/13/2020 1421 by Mariaelena Rodriguez RN  Outcome: Ongoing
Problem: Risk for Impaired Skin Integrity  Goal: Tissue integrity - skin and mucous membranes  Description  Structural intactness and normal physiological function of skin and  mucous membranes. 1/17/2020 1008 by Zaire Carrasco RN  Outcome: Ongoing  2/84/8935 2587 by Kianna Caputo RN  Outcome: Ongoing     Problem: Falls - Risk of:  Goal: Will remain free from falls  Description  Will remain free from falls  1/17/2020 1008 by Zaire Carrasco RN  Outcome: Ongoing  5/59/8014 4605 by Kianna Caputo RN  Outcome: Ongoing  Goal: Absence of physical injury  Description  Absence of physical injury  1/17/2020 1008 by Zaire Carrasco RN  Outcome: Ongoing  2/65/8527 2347 by Kianna Caputo RN  Outcome: Ongoing     Problem: Pain:  Description  Pain management should include both nonpharmacologic and pharmacologic interventions.   Goal: Pain level will decrease  Description  Pain level will decrease  1/17/2020 1008 by Zaire Carrasco RN  Outcome: Ongoing  1/70/7109 2401 by Kianna Caputo RN  Outcome: Ongoing  Goal: Control of acute pain  Description  Control of acute pain  1/17/2020 1008 by Zaire Carrasco RN  Outcome: Ongoing  0/59/6815 5661 by Kianna Caputo RN  Outcome: Ongoing  Goal: Control of chronic pain  Description  Control of chronic pain  1/17/2020 1008 by Zaire Carrasco RN  Outcome: Ongoing  3/38/3557 6911 by Kianna Caputo RN  Outcome: Ongoing
Problem: Discharge Planning:  Goal: Patients continuum of care needs are met  Description  Patients continuum of care needs are met  Outcome: Ongoing

## 2020-01-19 LAB
ANION GAP SERPL CALCULATED.3IONS-SCNC: 11 MMOL/L (ref 4–16)
BUN BLDV-MCNC: 9 MG/DL (ref 6–23)
CALCIUM SERPL-MCNC: 9.4 MG/DL (ref 8.3–10.6)
CHLORIDE BLD-SCNC: 101 MMOL/L (ref 99–110)
CO2: 24 MMOL/L (ref 21–32)
CREAT SERPL-MCNC: 0.7 MG/DL (ref 0.6–1.1)
CULTURE: ABNORMAL
CULTURE: ABNORMAL
DOSE AMOUNT: ABNORMAL
DOSE TIME: ABNORMAL
GFR AFRICAN AMERICAN: >60 ML/MIN/1.73M2
GFR NON-AFRICAN AMERICAN: >60 ML/MIN/1.73M2
GLUCOSE BLD-MCNC: 104 MG/DL (ref 70–99)
HCT VFR BLD CALC: 31.9 % (ref 37–47)
HEMOGLOBIN: 9.1 GM/DL (ref 12.5–16)
Lab: ABNORMAL
MCH RBC QN AUTO: 26.4 PG (ref 27–31)
MCHC RBC AUTO-ENTMCNC: 28.5 % (ref 32–36)
MCV RBC AUTO: 92.5 FL (ref 78–100)
PDW BLD-RTO: 14.3 % (ref 11.7–14.9)
PLATELET # BLD: 248 K/CU MM (ref 140–440)
PMV BLD AUTO: 10.2 FL (ref 7.5–11.1)
POTASSIUM SERPL-SCNC: 4.3 MMOL/L (ref 3.5–5.1)
RBC # BLD: 3.45 M/CU MM (ref 4.2–5.4)
SODIUM BLD-SCNC: 136 MMOL/L (ref 135–145)
SPECIMEN: ABNORMAL
VANCOMYCIN TROUGH: 22.7 UG/ML (ref 10–20)
WBC # BLD: 5 K/CU MM (ref 4–10.5)

## 2020-01-19 PROCEDURE — 85027 COMPLETE CBC AUTOMATED: CPT

## 2020-01-19 PROCEDURE — 2580000003 HC RX 258: Performed by: FAMILY MEDICINE

## 2020-01-19 PROCEDURE — 1200000000 HC SEMI PRIVATE

## 2020-01-19 PROCEDURE — 80048 BASIC METABOLIC PNL TOTAL CA: CPT

## 2020-01-19 PROCEDURE — 36415 COLL VENOUS BLD VENIPUNCTURE: CPT

## 2020-01-19 PROCEDURE — 80202 ASSAY OF VANCOMYCIN: CPT

## 2020-01-19 PROCEDURE — 99024 POSTOP FOLLOW-UP VISIT: CPT | Performed by: SURGERY

## 2020-01-19 PROCEDURE — 6370000000 HC RX 637 (ALT 250 FOR IP): Performed by: PAIN MEDICINE

## 2020-01-19 PROCEDURE — 6370000000 HC RX 637 (ALT 250 FOR IP): Performed by: FAMILY MEDICINE

## 2020-01-19 PROCEDURE — 6370000000 HC RX 637 (ALT 250 FOR IP): Performed by: SURGERY

## 2020-01-19 PROCEDURE — 94762 N-INVAS EAR/PLS OXIMTRY CONT: CPT

## 2020-01-19 PROCEDURE — 82565 ASSAY OF CREATININE: CPT

## 2020-01-19 PROCEDURE — 6360000002 HC RX W HCPCS: Performed by: SURGERY

## 2020-01-19 RX ORDER — OXYCODONE AND ACETAMINOPHEN 7.5; 325 MG/1; MG/1
1 TABLET ORAL EVERY 6 HOURS PRN
Status: DISCONTINUED | OUTPATIENT
Start: 2020-01-19 | End: 2020-01-23 | Stop reason: HOSPADM

## 2020-01-19 RX ORDER — OXYCODONE AND ACETAMINOPHEN 7.5; 325 MG/1; MG/1
1 TABLET ORAL NIGHTLY PRN
Status: DISCONTINUED | OUTPATIENT
Start: 2020-01-19 | End: 2020-01-23 | Stop reason: HOSPADM

## 2020-01-19 RX ORDER — OXYCODONE AND ACETAMINOPHEN 7.5; 325 MG/1; MG/1
1 TABLET ORAL EVERY 4 HOURS PRN
Status: DISCONTINUED | OUTPATIENT
Start: 2020-01-19 | End: 2020-01-19

## 2020-01-19 RX ADMIN — MECLIZINE HYDROCHLORIDE 25 MG: 25 TABLET ORAL at 21:13

## 2020-01-19 RX ADMIN — MECLIZINE HYDROCHLORIDE 25 MG: 25 TABLET ORAL at 09:07

## 2020-01-19 RX ADMIN — DICYCLOMINE HYDROCHLORIDE 20 MG: 20 TABLET ORAL at 21:13

## 2020-01-19 RX ADMIN — VANCOMYCIN HYDROCHLORIDE 1000 MG: 1 INJECTION, SOLUTION INTRAVENOUS at 09:11

## 2020-01-19 RX ADMIN — LEVOTHYROXINE SODIUM 125 MCG: 125 TABLET ORAL at 06:29

## 2020-01-19 RX ADMIN — VANCOMYCIN HYDROCHLORIDE 1000 MG: 1 INJECTION, SOLUTION INTRAVENOUS at 02:30

## 2020-01-19 RX ADMIN — MECLIZINE HYDROCHLORIDE 25 MG: 25 TABLET ORAL at 13:32

## 2020-01-19 RX ADMIN — OXYCODONE HYDROCHLORIDE AND ACETAMINOPHEN 1 TABLET: 7.5; 325 TABLET ORAL at 14:42

## 2020-01-19 RX ADMIN — PANTOPRAZOLE SODIUM 40 MG: 40 TABLET, DELAYED RELEASE ORAL at 06:29

## 2020-01-19 RX ADMIN — OXYCODONE AND ACETAMINOPHEN 1 TABLET: 5; 325 TABLET ORAL at 11:22

## 2020-01-19 RX ADMIN — OYSTER SHELL CALCIUM WITH VITAMIN D 1 TABLET: 500; 200 TABLET, FILM COATED ORAL at 09:05

## 2020-01-19 RX ADMIN — VANCOMYCIN HYDROCHLORIDE 1000 MG: 1 INJECTION, SOLUTION INTRAVENOUS at 17:24

## 2020-01-19 RX ADMIN — TIZANIDINE 4 MG: 4 TABLET ORAL at 21:13

## 2020-01-19 RX ADMIN — LORAZEPAM 1 MG: 1 TABLET ORAL at 09:06

## 2020-01-19 RX ADMIN — OXYCODONE AND ACETAMINOPHEN 1 TABLET: 5; 325 TABLET ORAL at 06:29

## 2020-01-19 RX ADMIN — GABAPENTIN 800 MG: 400 CAPSULE ORAL at 09:06

## 2020-01-19 RX ADMIN — GABAPENTIN 800 MG: 400 CAPSULE ORAL at 21:13

## 2020-01-19 RX ADMIN — DICYCLOMINE HYDROCHLORIDE 20 MG: 20 TABLET ORAL at 09:06

## 2020-01-19 RX ADMIN — DICYCLOMINE HYDROCHLORIDE 20 MG: 20 TABLET ORAL at 13:32

## 2020-01-19 RX ADMIN — OXYCODONE HYDROCHLORIDE AND ACETAMINOPHEN 1 TABLET: 7.5; 325 TABLET ORAL at 21:13

## 2020-01-19 RX ADMIN — TIZANIDINE 4 MG: 4 TABLET ORAL at 09:06

## 2020-01-19 RX ADMIN — OXYCODONE AND ACETAMINOPHEN 1 TABLET: 5; 325 TABLET ORAL at 01:25

## 2020-01-19 RX ADMIN — FERROUS SULFATE TAB 325 MG (65 MG ELEMENTAL FE) 325 MG: 325 (65 FE) TAB at 21:13

## 2020-01-19 RX ADMIN — MULTIPLE VITAMINS W/ MINERALS TAB 1 TABLET: TAB at 09:06

## 2020-01-19 RX ADMIN — Medication 5000 UNITS: at 09:06

## 2020-01-19 RX ADMIN — FERROUS SULFATE TAB 325 MG (65 MG ELEMENTAL FE) 325 MG: 325 (65 FE) TAB at 09:07

## 2020-01-19 RX ADMIN — SODIUM CHLORIDE, PRESERVATIVE FREE 10 ML: 5 INJECTION INTRAVENOUS at 09:09

## 2020-01-19 RX ADMIN — GABAPENTIN 800 MG: 400 CAPSULE ORAL at 13:32

## 2020-01-19 RX ADMIN — PAROXETINE HYDROCHLORIDE 40 MG: 20 TABLET, FILM COATED ORAL at 21:13

## 2020-01-19 ASSESSMENT — PAIN DESCRIPTION - FREQUENCY: FREQUENCY: CONTINUOUS

## 2020-01-19 ASSESSMENT — PAIN SCALES - GENERAL
PAINLEVEL_OUTOF10: 5
PAINLEVEL_OUTOF10: 9
PAINLEVEL_OUTOF10: 10
PAINLEVEL_OUTOF10: 8
PAINLEVEL_OUTOF10: 0
PAINLEVEL_OUTOF10: 9
PAINLEVEL_OUTOF10: 8

## 2020-01-19 ASSESSMENT — PAIN DESCRIPTION - PAIN TYPE: TYPE: CHRONIC PAIN

## 2020-01-19 ASSESSMENT — PAIN DESCRIPTION - ORIENTATION: ORIENTATION: RIGHT;LEFT

## 2020-01-19 ASSESSMENT — PAIN DESCRIPTION - LOCATION: LOCATION: LEG

## 2020-01-19 ASSESSMENT — PAIN DESCRIPTION - PROGRESSION: CLINICAL_PROGRESSION: NOT CHANGED

## 2020-01-19 ASSESSMENT — PAIN DESCRIPTION - DESCRIPTORS: DESCRIPTORS: CONSTANT

## 2020-01-19 ASSESSMENT — PAIN DESCRIPTION - ONSET: ONSET: ON-GOING

## 2020-01-19 NOTE — PROGRESS NOTES
GENERAL SURGERY PROGRESS NOTE    CC/HPI:           Patient feels better from removal of her port surgery. Vitals:    01/18/20 0842 01/18/20 1700 01/18/20 2037 01/19/20 0335   BP: 117/64 (!) 98/50 (!) 130/91 (!) 93/43   Pulse: 63 60 64 (!) 48   Resp: 18 16 18 18   Temp: 98.6 °F (37 °C) 98.2 °F (36.8 °C) 98.8 °F (37.1 °C) 97.9 °F (36.6 °C)   TempSrc: Oral Oral Oral Oral   SpO2: 98%  97% 97%   Weight:   279 lb 9.6 oz (126.8 kg) 286 lb 3.2 oz (129.8 kg)   Height:         No intake/output data recorded. No intake/output data recorded. DIET LOW FAT; Recent Results (from the past 48 hour(s))   Vancomycin, trough    Collection Time: 01/18/20  3:43 AM   Result Value Ref Range    Vancomycin Tr 23.1 (H) 10 - 20 UG/ML    DOSE AMOUNT DOSE AMT.  GIVEN - 1250 mg     DOSE TIME DOSE TIME GIVEN - 0500    CBC    Collection Time: 01/18/20  3:43 AM   Result Value Ref Range    WBC 4.3 4.0 - 10.5 K/CU MM    RBC 3.42 (L) 4.2 - 5.4 M/CU MM    Hemoglobin 9.0 (L) 12.5 - 16.0 GM/DL    Hematocrit 32.4 (L) 37 - 47 %    MCV 94.7 78 - 100 FL    MCH 26.3 (L) 27 - 31 PG    MCHC 27.8 (L) 32.0 - 36.0 %    RDW 14.3 11.7 - 14.9 %    Platelets 386 885 - 428 K/CU MM    MPV 10.8 7.5 - 11.1 FL   Basic metabolic panel    Collection Time: 01/18/20  3:43 AM   Result Value Ref Range    Sodium 133 (L) 135 - 145 MMOL/L    Potassium 4.3 3.5 - 5.1 MMOL/L    Chloride 102 99 - 110 mMol/L    CO2 21 21 - 32 MMOL/L    Anion Gap 10 4 - 16    BUN 7 6 - 23 MG/DL    CREATININE 0.6 0.6 - 1.1 MG/DL    Glucose 115 (H) 70 - 99 MG/DL    Calcium 9.6 8.3 - 10.6 MG/DL    GFR Non-African American >60 >60 mL/min/1.73m2    GFR African American >60 >60 mL/min/1.73m2   CBC    Collection Time: 01/19/20  7:50 AM   Result Value Ref Range    WBC 5.0 4.0 - 10.5 K/CU MM    RBC 3.45 (L) 4.2 - 5.4 M/CU MM    Hemoglobin 9.1 (L) 12.5 - 16.0 GM/DL    Hematocrit 31.9 (L) 37 - 47 %    MCV 92.5 78 - 100 FL    MCH 26.4 (L) 27 - 31 PG    MCHC 28.5 (L) 32.0 - 36.0 %    RDW 14.3 11.7 - 14.9 %    Platelets 787 538 - 708 K/CU MM    MPV 10.2 7.5 - 11.1 FL   Vancomycin, trough    Collection Time: 01/19/20  7:50 AM   Result Value Ref Range    Vancomycin Tr 22.7 (H) 10 - 20 UG/ML    DOSE AMOUNT DOSE AMT. GIVEN - 1gm     DOSE TIME DOSE TIME GIVEN - 0800        Scheduled Meds:   vancomycin  1,000 mg Intravenous Q8H    lidocaine PF  5 mL Intradermal Once    sodium chloride flush  10 mL Intravenous 2 times per day    sodium chloride flush  10 mL Intravenous 2 times per day    sodium chloride flush  10 mL Intravenous 2 times per day    meclizine  25 mg Oral TID    PARoxetine  40 mg Oral Nightly    gabapentin  800 mg Oral TID    levothyroxine  125 mcg Oral Daily    therapeutic multivitamin-minerals  1 tablet Oral Daily    dicyclomine  20 mg Oral Q6H    atenolol  25 mg Oral Daily    tiZANidine  4 mg Oral BID    ferrous sulfate  325 mg Oral BID    vitamin D  5,000 Units Oral Daily    pantoprazole  40 mg Oral QAM AC    enoxaparin  40 mg Subcutaneous Daily    calcium-vitamin D  1 tablet Oral BID WC       Continuous Infusions:      Physical Exam:  HEENT: Anicteric sclerae, Oropharyngeal mucosae moist, pink and intact. Heart:  Normal S1 and S2, RRR  Lungs: Clear to auscultation bilaterally, No audible Wheezes or Rales. Extremities: No edema. Neuro: Alert and Oriented x 3, Non focal.  Abdomen: Soft, Benign, Non tender, Non distended, Positive bowel sounds. Incision: Nicely healing: No new erythema - unchanged, No purulent discharge. Active Problems:    Nausea & vomiting    Generalized abdominal pain    Dizziness    Abdominal pain, epigastric  Resolved Problems:    * No resolved hospital problems. *      Assessment and Plan:  Audie Perkins is a 44 y.o. female who is POD # 4 status post:  1) REMOVAL of her 6.6-Indonesian, low profile Mediport from the right   subclavian vein approach.   2) Purulent discharge was sent for aerobic and anaerobic cultures.       Cultures positive for MRSA,

## 2020-01-19 NOTE — PROGRESS NOTES
rhonchi,  Cardiovascular: RRR, with normal S1/S2 without murmurs. Abdomen : Soft, tender, non-distended  , normal bowel sounds. Legs : No edema bilaterally. No DVT signs ,    Neurologic:  Alert and oriented , no focal sensory/motor deficits , grossly non-focal.  Psych : very anxious       Labs:   Recent Labs     01/17/20  0710 01/18/20  0343 01/19/20  0750   WBC 4.7 4.3 5.0   HGB 9.6* 9.0* 9.1*   HCT 32.5* 32.4* 31.9*   * 165 248     Recent Labs     01/17/20  0710 01/18/20  0343 01/19/20  0750   * 133* 136   K 4.8 4.3 4.3    102 101   CO2 20* 21 24   BUN 10 7 9   CREATININE 0.7 0.6 0.7   CALCIUM 9.2 9.6 9.4     No results for input(s): AST, ALT, BILIDIR, BILITOT, ALKPHOS in the last 72 hours. No results for input(s): INR in the last 72 hours. No results for input(s): Jared Jauregui in the last 72 hours. Assessment/Plan:    Active Hospital Problems    Diagnosis Date Noted    Abdominal pain, epigastric [R10.13]     Dizziness [R42] 01/09/2020    Generalized abdominal pain [R10.84] 09/26/2015    Nausea & vomiting [R11.2] 04/19/2013   bacteremia MRSA           Plan for d/c tomorrow     Detailed d/w pt about IV ABX, cultures, PT/OT . . and frequent blood woek /lab after d/c . [facility] pt asked for Ativan   Tele   Port out 1/15.2020  PILO : no concern   Blood c/s: MRSA . [facility] Sites ABX IV . April Sites RT hand XR : Diffuse soft tissue swelling of the dorsum of the hand of uncertain etiology. April Sites less swollen today   Adjust pain meds . [facility] ,increase  Percocet to Q 4  , d/c dilaudid  Add ativan PO BID    stable  H/H   Symptomatic treatment Zofran, Phenergan PRN. ...h/h stable . Previous Images :  Brain MRI/MRA : both with no concern . .pt still dizzy with double vision . ...  neuro input noted . . plan to   CXR and lumbar XR: . Both non acute    Lumbar MRI : non acute . .. blood c/s: pending . . 2 D echo : no concern   . ..  plan for PILO   Hand CT : non acute       Diet: DIET LOW FAT;  Code Status: Full

## 2020-01-19 NOTE — CARE COORDINATION
LSW received a call from [facility] and he informed this LSW that pt will not be discharge today. CM will follow up tomorrow regarding Home IV anti-bio with [facility] and [facility].

## 2020-01-19 NOTE — CONSULTS
[address] [place] [address], [address] [facility]                                  CONSULTATION    PATIENT NAME: Gutierrez Barriga                  :        1970  MED REC NO:   [id number]                          ROOM:       3019  ACCOUNT NO:   [de-identified]                           ADMIT DATE: 2020  PROVIDER:     Maci Ferrara MD    CONSULT DATE:  2020    HISTORY OF PRESENT ILLNESS:  This is a 75-year-old lady who is admitted  and had a MediPort removed due to infection. We are awaiting negative  blood culture before she is discharged. She states that her pain is not  controlled, there is no apparent clinical dose for that. PHYSICAL EXAMINATION:  Her exam is unchanged. ASSESSMENT AND PLAN:  I talked to the patient and the plan is to  increase her Percocet to 5 mg q.6 h. p.r.n. while awaiting the culture. Phenergan 12.5 mg IV q.8 h. x3 doses. The patient would be discharged  once we have received the culture and she will follow up with the [facility].         Sam Last MD    D: 2020 10:49:01       T: 2020 12:40:39     [email or web address]  Job#: [id number]     Doc#: [id number]    CC:

## 2020-01-19 NOTE — CARE COORDINATION
LSW spoke with pt regarding her home with IV anti-bio. Pt stated she ha had Iv anti-bio before and she requested [facility]. LSW called [facility] and gave referral.  LSW faxed pt HC order, face sheet, MAR, H&P, PT/OT  notes and face sheet to [facility].. Reinaldo Orozco asked if they will need to stat this evening or tomorrow morning. LSW stated pt needs IV 3 x a day so pt will need to be seen this evening. Ross Moore stated she will try to see if she has RN available to go to pt home tonight and will call this LSW back soon. LSW called [facility] to set up the IV for home infusion. Waiting for a return call. Pt will need a written signed Rx for the IV anti-bio. GERALDOW PS Dr. Rashaun Farris and informed him that this Rx is needed as soon as possible.

## 2020-01-19 NOTE — PROGRESS NOTES
vancomycin to be continued. · Pharmacy will continue to monitor patient and adjust therapy as indicated    Thank you for the consult. Bridget London  1/19/2020 1:08 PM

## 2020-01-20 LAB
ANION GAP SERPL CALCULATED.3IONS-SCNC: 15 MMOL/L (ref 4–16)
BUN BLDV-MCNC: 12 MG/DL (ref 6–23)
CALCIUM SERPL-MCNC: 9.5 MG/DL (ref 8.3–10.6)
CHLORIDE BLD-SCNC: 103 MMOL/L (ref 99–110)
CO2: 19 MMOL/L (ref 21–32)
CREAT SERPL-MCNC: 0.7 MG/DL (ref 0.6–1.1)
GFR AFRICAN AMERICAN: >60 ML/MIN/1.73M2
GFR NON-AFRICAN AMERICAN: >60 ML/MIN/1.73M2
GLUCOSE BLD-MCNC: 100 MG/DL (ref 70–99)
HCT VFR BLD CALC: 34.4 % (ref 37–47)
HEMOGLOBIN: 9.2 GM/DL (ref 12.5–16)
MCH RBC QN AUTO: 26.1 PG (ref 27–31)
MCHC RBC AUTO-ENTMCNC: 26.7 % (ref 32–36)
MCV RBC AUTO: 97.5 FL (ref 78–100)
PDW BLD-RTO: 14.1 % (ref 11.7–14.9)
PLATELET # BLD: 201 K/CU MM (ref 140–440)
PMV BLD AUTO: 10 FL (ref 7.5–11.1)
POTASSIUM SERPL-SCNC: 4.9 MMOL/L (ref 3.5–5.1)
RBC # BLD: 3.53 M/CU MM (ref 4.2–5.4)
SODIUM BLD-SCNC: 137 MMOL/L (ref 135–145)
WBC # BLD: 5.1 K/CU MM (ref 4–10.5)

## 2020-01-20 PROCEDURE — 82565 ASSAY OF CREATININE: CPT

## 2020-01-20 PROCEDURE — 2580000003 HC RX 258: Performed by: FAMILY MEDICINE

## 2020-01-20 PROCEDURE — 6360000002 HC RX W HCPCS: Performed by: SURGERY

## 2020-01-20 PROCEDURE — 6370000000 HC RX 637 (ALT 250 FOR IP): Performed by: PAIN MEDICINE

## 2020-01-20 PROCEDURE — 80048 BASIC METABOLIC PNL TOTAL CA: CPT

## 2020-01-20 PROCEDURE — 99232 SBSQ HOSP IP/OBS MODERATE 35: CPT | Performed by: INTERNAL MEDICINE

## 2020-01-20 PROCEDURE — 99024 POSTOP FOLLOW-UP VISIT: CPT | Performed by: SURGERY

## 2020-01-20 PROCEDURE — 36415 COLL VENOUS BLD VENIPUNCTURE: CPT

## 2020-01-20 PROCEDURE — 2580000003 HC RX 258: Performed by: SURGERY

## 2020-01-20 PROCEDURE — 6370000000 HC RX 637 (ALT 250 FOR IP): Performed by: SURGERY

## 2020-01-20 PROCEDURE — 1200000000 HC SEMI PRIVATE

## 2020-01-20 PROCEDURE — 6370000000 HC RX 637 (ALT 250 FOR IP): Performed by: FAMILY MEDICINE

## 2020-01-20 PROCEDURE — 85027 COMPLETE CBC AUTOMATED: CPT

## 2020-01-20 PROCEDURE — 94761 N-INVAS EAR/PLS OXIMETRY MLT: CPT

## 2020-01-20 RX ADMIN — VANCOMYCIN HYDROCHLORIDE 1000 MG: 1 INJECTION, SOLUTION INTRAVENOUS at 17:55

## 2020-01-20 RX ADMIN — OYSTER SHELL CALCIUM WITH VITAMIN D 1 TABLET: 500; 200 TABLET, FILM COATED ORAL at 16:50

## 2020-01-20 RX ADMIN — TIZANIDINE 4 MG: 4 TABLET ORAL at 09:24

## 2020-01-20 RX ADMIN — VANCOMYCIN HYDROCHLORIDE 1000 MG: 1 INJECTION, SOLUTION INTRAVENOUS at 09:34

## 2020-01-20 RX ADMIN — ENOXAPARIN SODIUM 40 MG: 40 INJECTION SUBCUTANEOUS at 09:24

## 2020-01-20 RX ADMIN — SODIUM CHLORIDE, PRESERVATIVE FREE 10 ML: 5 INJECTION INTRAVENOUS at 02:51

## 2020-01-20 RX ADMIN — LORAZEPAM 0.5 MG: 0.5 TABLET ORAL at 15:21

## 2020-01-20 RX ADMIN — Medication 5000 UNITS: at 09:23

## 2020-01-20 RX ADMIN — OXYCODONE HYDROCHLORIDE AND ACETAMINOPHEN 1 TABLET: 7.5; 325 TABLET ORAL at 16:46

## 2020-01-20 RX ADMIN — DICYCLOMINE HYDROCHLORIDE 20 MG: 20 TABLET ORAL at 15:21

## 2020-01-20 RX ADMIN — PANTOPRAZOLE SODIUM 40 MG: 40 TABLET, DELAYED RELEASE ORAL at 09:29

## 2020-01-20 RX ADMIN — LEVOTHYROXINE SODIUM 125 MCG: 125 TABLET ORAL at 09:27

## 2020-01-20 RX ADMIN — SODIUM CHLORIDE, PRESERVATIVE FREE 10 ML: 5 INJECTION INTRAVENOUS at 09:30

## 2020-01-20 RX ADMIN — OXYCODONE HYDROCHLORIDE AND ACETAMINOPHEN 1 TABLET: 7.5; 325 TABLET ORAL at 23:06

## 2020-01-20 RX ADMIN — DICYCLOMINE HYDROCHLORIDE 20 MG: 20 TABLET ORAL at 09:24

## 2020-01-20 RX ADMIN — SODIUM CHLORIDE, PRESERVATIVE FREE 10 ML: 5 INJECTION INTRAVENOUS at 09:29

## 2020-01-20 RX ADMIN — FERROUS SULFATE TAB 325 MG (65 MG ELEMENTAL FE) 325 MG: 325 (65 FE) TAB at 23:05

## 2020-01-20 RX ADMIN — GABAPENTIN 800 MG: 400 CAPSULE ORAL at 23:05

## 2020-01-20 RX ADMIN — MECLIZINE HYDROCHLORIDE 25 MG: 25 TABLET ORAL at 15:21

## 2020-01-20 RX ADMIN — MULTIPLE VITAMINS W/ MINERALS TAB 1 TABLET: TAB at 09:24

## 2020-01-20 RX ADMIN — FERROUS SULFATE TAB 325 MG (65 MG ELEMENTAL FE) 325 MG: 325 (65 FE) TAB at 09:24

## 2020-01-20 RX ADMIN — GABAPENTIN 800 MG: 400 CAPSULE ORAL at 09:24

## 2020-01-20 RX ADMIN — MECLIZINE HYDROCHLORIDE 25 MG: 25 TABLET ORAL at 23:06

## 2020-01-20 RX ADMIN — MECLIZINE HYDROCHLORIDE 25 MG: 25 TABLET ORAL at 09:24

## 2020-01-20 RX ADMIN — LORAZEPAM 1 MG: 1 TABLET ORAL at 23:07

## 2020-01-20 RX ADMIN — OYSTER SHELL CALCIUM WITH VITAMIN D 1 TABLET: 500; 200 TABLET, FILM COATED ORAL at 09:26

## 2020-01-20 RX ADMIN — DICYCLOMINE HYDROCHLORIDE 20 MG: 20 TABLET ORAL at 23:07

## 2020-01-20 RX ADMIN — PAROXETINE HYDROCHLORIDE 40 MG: 20 TABLET, FILM COATED ORAL at 23:06

## 2020-01-20 RX ADMIN — PROMETHAZINE HYDROCHLORIDE 25 MG: 25 TABLET ORAL at 15:23

## 2020-01-20 RX ADMIN — TIZANIDINE 4 MG: 4 TABLET ORAL at 23:06

## 2020-01-20 RX ADMIN — OXYCODONE HYDROCHLORIDE AND ACETAMINOPHEN 1 TABLET: 7.5; 325 TABLET ORAL at 04:30

## 2020-01-20 RX ADMIN — VANCOMYCIN HYDROCHLORIDE 1000 MG: 1 INJECTION, SOLUTION INTRAVENOUS at 01:47

## 2020-01-20 RX ADMIN — GABAPENTIN 800 MG: 400 CAPSULE ORAL at 15:20

## 2020-01-20 RX ADMIN — OXYCODONE HYDROCHLORIDE AND ACETAMINOPHEN 1 TABLET: 7.5; 325 TABLET ORAL at 10:40

## 2020-01-20 ASSESSMENT — PAIN SCALES - GENERAL
PAINLEVEL_OUTOF10: 7
PAINLEVEL_OUTOF10: 7
PAINLEVEL_OUTOF10: 0
PAINLEVEL_OUTOF10: 7
PAINLEVEL_OUTOF10: 8
PAINLEVEL_OUTOF10: 0

## 2020-01-20 NOTE — PROGRESS NOTES
Occupational Therapy  Per the physical rehabilitation process, the OT order will be discontinued. Current charting does not demonstrate need for skilled therapy services. Pt is independent with functional mobility and self care. . Please re-order if new mobility or self care impairments arise.      [address], OTR/L, [place]   [id number]   10:45 AM, 1/20/2020

## 2020-01-20 NOTE — PROGRESS NOTES
Attending Progress Note      PCP: Chico Caballero MD    Patient: Colette Prasad   Gender: female  : 1970   Age: 44 y.o. MRN: [id number]      Date of Admission: 2020    Chief Complaint:   Chief Complaint   Patient presents with    Dizziness     onset 3-4 days ago. reports dizziness and blurry vision    Abdominal Pain     hx of pancreatitis, reports right upper abd pain radiating around to her back with N/V/D           Subjective: rt 3 ed finger  Swelling . ... lumbar pian . ..encourage activity   No chills , no N/V/diarrhea     Medications:  Reviewed  Infusion Medications     Scheduled Medications    vancomycin  1,000 mg Intravenous Q8H    lidocaine PF  5 mL Intradermal Once    sodium chloride flush  10 mL Intravenous 2 times per day    sodium chloride flush  10 mL Intravenous 2 times per day    sodium chloride flush  10 mL Intravenous 2 times per day    meclizine  25 mg Oral TID    PARoxetine  40 mg Oral Nightly    gabapentin  800 mg Oral TID    levothyroxine  125 mcg Oral Daily    therapeutic multivitamin-minerals  1 tablet Oral Daily    dicyclomine  20 mg Oral Q6H    atenolol  25 mg Oral Daily    tiZANidine  4 mg Oral BID    ferrous sulfate  325 mg Oral BID    vitamin D  5,000 Units Oral Daily    pantoprazole  40 mg Oral QAM AC    enoxaparin  40 mg Subcutaneous Daily    calcium-vitamin D  1 tablet Oral BID WC     PRN Meds: oxyCODONE-acetaminophen, oxyCODONE-acetaminophen, sodium chloride flush, LORazepam, sodium chloride flush, sodium chloride flush, acetaminophen, diphenhydrAMINE, ondansetron, promethazine, magnesium hydroxide, ondansetron, LORazepam    No intake or output data in the 24 hours ending 20 1141    Exam:  BP (!) 97/48   Pulse 64   Temp 98.6 °F (37 °C) (Oral)   Resp 14   Ht 5' 4\" (1.626 m)   Wt 288 lb 4.8 oz (130.8 kg)   SpO2 97%   BMI 49.49 kg/m²   General appearance: less  Distress today ,   Respiratory:  symmetrical , good air entry , no Rales Code    Treatment progress and plan was d/w pt/family .         Stacy Pretty MD

## 2020-01-20 NOTE — PROGRESS NOTES
obesity with BMI of 50.0-59.9, adult (HCC)    Intractable vomiting with nausea    Class 3 obesity in adult    Hiatal hernia    Poor intravenous access    Post-operative nausea and vomiting    Status post bariatric surgery    Status post laparoscopic sleeve gastrectomy    Abscess    Dysphagia    Pseudoseizure    Chronic pain syndrome    Drug-seeking/Aberrant behavior    Acute conjunctivitis    Chronic osteomyelitis of left foot with draining sinus (HCC)    Receiving intravenous antibiotic treatment as outpatient    History of bacteremia    Abdominal pain, right upper quadrant    Sepsis due to Pseudomonas species ([address]    Bacteremia due to Pseudomonas    Catheter-related bloodstream infection    Hematemesis with nausea    Hematemesis    Lymph node disorder    Dizziness    Abdominal pain, epigastric       Active Problems  Active Problems:    Nausea & vomiting    Generalized abdominal pain    Dizziness    Abdominal pain, epigastric  Resolved Problems:    * No resolved hospital problems.  *    Electronically signed by: Electronically signed by Erika Jones MD on 1/20/2020 at 9:59 AM

## 2020-01-20 NOTE — PROGRESS NOTES
440 K/CU MM    MPV 10.0 7.5 - 11.1 FL   Basic metabolic panel    Collection Time: 01/20/20  7:32 AM   Result Value Ref Range    Sodium 137 135 - 145 MMOL/L    Potassium 4.9 3.5 - 5.1 MMOL/L    Chloride 103 99 - 110 mMol/L    CO2 19 (L) 21 - 32 MMOL/L    Anion Gap 15 4 - 16    BUN 12 6 - 23 MG/DL    CREATININE 0.7 0.6 - 1.1 MG/DL    Glucose 100 (H) 70 - 99 MG/DL    Calcium 9.5 8.3 - 10.6 MG/DL    GFR Non-African American >60 >60 mL/min/1.73m2    GFR African American >60 >60 mL/min/1.73m2       Scheduled Meds:   vancomycin  1,000 mg Intravenous Q8H    lidocaine PF  5 mL Intradermal Once    sodium chloride flush  10 mL Intravenous 2 times per day    sodium chloride flush  10 mL Intravenous 2 times per day    sodium chloride flush  10 mL Intravenous 2 times per day    meclizine  25 mg Oral TID    PARoxetine  40 mg Oral Nightly    gabapentin  800 mg Oral TID    levothyroxine  125 mcg Oral Daily    therapeutic multivitamin-minerals  1 tablet Oral Daily    dicyclomine  20 mg Oral Q6H    atenolol  25 mg Oral Daily    tiZANidine  4 mg Oral BID    ferrous sulfate  325 mg Oral BID    vitamin D  5,000 Units Oral Daily    pantoprazole  40 mg Oral QAM AC    enoxaparin  40 mg Subcutaneous Daily    calcium-vitamin D  1 tablet Oral BID WC       Continuous Infusions:      Physical Exam:  HEENT: Anicteric sclerae, Oropharyngeal mucosae moist, pink and intact. Heart:  Normal S1 and S2, RRR  Lungs: Clear to auscultation bilaterally, No audible Wheezes or Rales. Extremities: No edema. Neuro: Alert and Oriented x 3, Non focal.  Abdomen: Soft, Benign, Non tender, Non distended, Positive bowel sounds. Incision: Nicely healing: No new erythema - unchanged, No purulent discharge. Active Problems:    Nausea & vomiting    Generalized abdominal pain    Dizziness    Abdominal pain, epigastric  Resolved Problems:    * No resolved hospital problems.  *      Assessment and Plan:  Macrina Clayton is a 44 y.o. female who is

## 2020-01-20 NOTE — PROGRESS NOTES
[address] [facility]  consulted by Dr. Rob Petty for monitoring and adjustment. Indication for treatment: CRBSI- MRSA   Goal trough: 15 mcg/mL  Other Antimicrobials:  none     Pertinent Laboratory Values:   Temp Readings from Last 3 Encounters:   01/20/20 98.4 °F (36.9 °C) (Oral)   10/31/19 98.1 °F (36.7 °C) (Oral)   10/16/19 97.8 °F (36.6 °C) (Oral)     Recent Labs     01/18/20  0343 01/19/20  0750   WBC 4.3 5.0     Recent Labs     01/18/20  0343 01/19/20  0750   BUN 7 9   CREATININE 0.6 0.7     Estimated Creatinine Clearance: 128 mL/min (based on SCr of 0.7 mg/dL). No intake or output data in the 24 hours ending 01/20/20 0825    Pertinent Cultures:  Date    Source    Results  1/10   Blood    NGTD  1/13   Blood (2 of 2)   MRSA  1/15   Medi-Port   MRSA  1/15                             Surgical Cx                             MRSA  1/16   Blood    NGTD    Vancomycin level:   TROUGH:    Recent Labs     01/18/20  0343 01/19/20  0750   VANCOTROUGH 23.1* 22.7*     RANDOM:  No results for input(s): VANCORANDOM in the last 72 hours. Assessment:  · WBC and temperature:  WNL, afebrile   · SCr, BUN, and urine output: previously stable  · Day(s) of therapy: 8  · Vancomycin level:   · 1/14 - 12.3 mcg/ml - (12 hours post-dose on 1000 mg q8h IVPB)  · 1/16 - 10.0 mcg/ml - (7 hours post-dose on 1000 mg q8h IVPB)   · 1/18 - 23.1 mcg/ml - (6 hours post-dose on 1,250 mg q8h IVPB, true trough ~19.9)  · 1/19 - 22.7 mcg/ml - (5 hours post-dose on 1,000 mg q8h IVPB, true trough ~18.2)    Plan:  · Vancomycin has accumulated 2/2 BMI   · Continue 1000 mg q8h IVPB   · Previous trough drawn early (actual trough is therapeutic)   · Repeat trough tomorrow, may need further dose decrease to 750 mg q8h due to accumulation   · Pharmacy will continue to monitor patient and adjust therapy as indicated    Thank you for the consult.   Kyle Girard RPh  1/20/2020 8:25 AM

## 2020-01-20 NOTE — CARE COORDINATION
Received voicemail from [address] who states patient just received a walker from them June 25, 2019 hence is not eligible per her insurance. CM followed up with patient who confirmed her plan to return home with [facility] for IV atbx. Updated her about not eligible for walker d/t receiving one in June and she verbalized understanding. Patient requests a BSC, spoke with [address] who states she has not received one from them in the past hence is eligible. 11:12 AM  PS to Dr. Osmin Rosa to update him of need for wet signed prescription for home IV antibiotics and patient request for [facility].       11:50 AM  Updated per Elvia MEJIA patient not going home today due to need for MRI and machine being down. PS to [facility] with [facility] to update her.

## 2020-01-21 ENCOUNTER — APPOINTMENT (OUTPATIENT)
Dept: MRI IMAGING | Age: 50
DRG: 315 | End: 2020-01-21
Payer: COMMERCIAL

## 2020-01-21 LAB
ANION GAP SERPL CALCULATED.3IONS-SCNC: 12 MMOL/L (ref 4–16)
BUN BLDV-MCNC: 12 MG/DL (ref 6–23)
CALCIUM SERPL-MCNC: 9.3 MG/DL (ref 8.3–10.6)
CHLORIDE BLD-SCNC: 102 MMOL/L (ref 99–110)
CO2: 23 MMOL/L (ref 21–32)
CREAT SERPL-MCNC: 0.8 MG/DL (ref 0.6–1.1)
CULTURE: NORMAL
CULTURE: NORMAL
DOSE AMOUNT: NORMAL
DOSE TIME: NORMAL
GFR AFRICAN AMERICAN: >60 ML/MIN/1.73M2
GFR NON-AFRICAN AMERICAN: >60 ML/MIN/1.73M2
GLUCOSE BLD-MCNC: 106 MG/DL (ref 70–99)
HCT VFR BLD CALC: 33.6 % (ref 37–47)
HCT VFR BLD CALC: 36.4 % (ref 37–47)
HEMOGLOBIN: 10 GM/DL (ref 12.5–16)
HEMOGLOBIN: 9.9 GM/DL (ref 12.5–16)
Lab: NORMAL
Lab: NORMAL
MCH RBC QN AUTO: 26.2 PG (ref 27–31)
MCH RBC QN AUTO: 26.8 PG (ref 27–31)
MCHC RBC AUTO-ENTMCNC: 27.5 % (ref 32–36)
MCHC RBC AUTO-ENTMCNC: 29.5 % (ref 32–36)
MCV RBC AUTO: 88.9 FL (ref 78–100)
MCV RBC AUTO: 97.6 FL (ref 78–100)
PDW BLD-RTO: 14 % (ref 11.7–14.9)
PDW BLD-RTO: 14 % (ref 11.7–14.9)
PLATELET # BLD: 385 K/CU MM (ref 140–440)
PLATELET # BLD: ABNORMAL K/CU MM (ref 140–440)
PMV BLD AUTO: 12 FL (ref 7.5–11.1)
PMV BLD AUTO: 9.9 FL (ref 7.5–11.1)
POTASSIUM SERPL-SCNC: 4.5 MMOL/L (ref 3.5–5.1)
RBC # BLD: 3.73 M/CU MM (ref 4.2–5.4)
RBC # BLD: 3.78 M/CU MM (ref 4.2–5.4)
SODIUM BLD-SCNC: 137 MMOL/L (ref 135–145)
SPECIMEN: NORMAL
SPECIMEN: NORMAL
VANCOMYCIN TROUGH: 20 UG/ML (ref 10–20)
WBC # BLD: 4.1 K/CU MM (ref 4–10.5)
WBC # BLD: 5.2 K/CU MM (ref 4–10.5)

## 2020-01-21 PROCEDURE — 6370000000 HC RX 637 (ALT 250 FOR IP): Performed by: SURGERY

## 2020-01-21 PROCEDURE — 36415 COLL VENOUS BLD VENIPUNCTURE: CPT

## 2020-01-21 PROCEDURE — 6360000002 HC RX W HCPCS: Performed by: SURGERY

## 2020-01-21 PROCEDURE — 6370000000 HC RX 637 (ALT 250 FOR IP): Performed by: PAIN MEDICINE

## 2020-01-21 PROCEDURE — 1200000000 HC SEMI PRIVATE

## 2020-01-21 PROCEDURE — 99024 POSTOP FOLLOW-UP VISIT: CPT | Performed by: SURGERY

## 2020-01-21 PROCEDURE — 99232 SBSQ HOSP IP/OBS MODERATE 35: CPT | Performed by: INTERNAL MEDICINE

## 2020-01-21 PROCEDURE — 73218 MRI UPPER EXTREMITY W/O DYE: CPT

## 2020-01-21 PROCEDURE — 85027 COMPLETE CBC AUTOMATED: CPT

## 2020-01-21 PROCEDURE — 2580000003 HC RX 258: Performed by: INTERNAL MEDICINE

## 2020-01-21 PROCEDURE — 6370000000 HC RX 637 (ALT 250 FOR IP): Performed by: FAMILY MEDICINE

## 2020-01-21 PROCEDURE — 80048 BASIC METABOLIC PNL TOTAL CA: CPT

## 2020-01-21 PROCEDURE — 6360000002 HC RX W HCPCS: Performed by: INTERNAL MEDICINE

## 2020-01-21 PROCEDURE — 80202 ASSAY OF VANCOMYCIN: CPT

## 2020-01-21 PROCEDURE — 2580000003 HC RX 258: Performed by: FAMILY MEDICINE

## 2020-01-21 PROCEDURE — 2580000003 HC RX 258: Performed by: SURGERY

## 2020-01-21 RX ORDER — SODIUM CHLORIDE 0.9 % (FLUSH) 0.9 %
10 SYRINGE (ML) INJECTION PRN
Status: DISCONTINUED | OUTPATIENT
Start: 2020-01-21 | End: 2020-01-23 | Stop reason: HOSPADM

## 2020-01-21 RX ORDER — LIDOCAINE HYDROCHLORIDE 10 MG/ML
5 INJECTION, SOLUTION EPIDURAL; INFILTRATION; INTRACAUDAL; PERINEURAL ONCE
Status: COMPLETED | OUTPATIENT
Start: 2020-01-21 | End: 2020-01-22

## 2020-01-21 RX ORDER — SODIUM CHLORIDE 0.9 % (FLUSH) 0.9 %
10 SYRINGE (ML) INJECTION EVERY 12 HOURS SCHEDULED
Status: DISCONTINUED | OUTPATIENT
Start: 2020-01-21 | End: 2020-01-23 | Stop reason: HOSPADM

## 2020-01-21 RX ADMIN — DICYCLOMINE HYDROCHLORIDE 20 MG: 20 TABLET ORAL at 08:47

## 2020-01-21 RX ADMIN — VANCOMYCIN HYDROCHLORIDE 750 MG: 5 INJECTION, POWDER, LYOPHILIZED, FOR SOLUTION INTRAVENOUS at 20:35

## 2020-01-21 RX ADMIN — VANCOMYCIN HYDROCHLORIDE 1000 MG: 1 INJECTION, SOLUTION INTRAVENOUS at 12:11

## 2020-01-21 RX ADMIN — GABAPENTIN 800 MG: 400 CAPSULE ORAL at 20:36

## 2020-01-21 RX ADMIN — MECLIZINE HYDROCHLORIDE 25 MG: 25 TABLET ORAL at 12:50

## 2020-01-21 RX ADMIN — DICYCLOMINE HYDROCHLORIDE 20 MG: 20 TABLET ORAL at 12:50

## 2020-01-21 RX ADMIN — MECLIZINE HYDROCHLORIDE 25 MG: 25 TABLET ORAL at 20:36

## 2020-01-21 RX ADMIN — MULTIPLE VITAMINS W/ MINERALS TAB 1 TABLET: TAB at 08:47

## 2020-01-21 RX ADMIN — FERROUS SULFATE TAB 325 MG (65 MG ELEMENTAL FE) 325 MG: 325 (65 FE) TAB at 20:36

## 2020-01-21 RX ADMIN — OXYCODONE HYDROCHLORIDE AND ACETAMINOPHEN 1 TABLET: 7.5; 325 TABLET ORAL at 06:33

## 2020-01-21 RX ADMIN — LORAZEPAM 1 MG: 1 TABLET ORAL at 12:49

## 2020-01-21 RX ADMIN — OYSTER SHELL CALCIUM WITH VITAMIN D 1 TABLET: 500; 200 TABLET, FILM COATED ORAL at 18:48

## 2020-01-21 RX ADMIN — ATENOLOL 25 MG: 25 TABLET ORAL at 08:45

## 2020-01-21 RX ADMIN — SODIUM CHLORIDE, PRESERVATIVE FREE 10 ML: 5 INJECTION INTRAVENOUS at 08:47

## 2020-01-21 RX ADMIN — SODIUM CHLORIDE, PRESERVATIVE FREE 10 ML: 5 INJECTION INTRAVENOUS at 20:36

## 2020-01-21 RX ADMIN — DICYCLOMINE HYDROCHLORIDE 20 MG: 20 TABLET ORAL at 20:36

## 2020-01-21 RX ADMIN — LEVOTHYROXINE SODIUM 125 MCG: 125 TABLET ORAL at 06:32

## 2020-01-21 RX ADMIN — GABAPENTIN 800 MG: 400 CAPSULE ORAL at 12:50

## 2020-01-21 RX ADMIN — TIZANIDINE 4 MG: 4 TABLET ORAL at 20:36

## 2020-01-21 RX ADMIN — GABAPENTIN 800 MG: 400 CAPSULE ORAL at 08:46

## 2020-01-21 RX ADMIN — VANCOMYCIN HYDROCHLORIDE 1000 MG: 1 INJECTION, SOLUTION INTRAVENOUS at 02:40

## 2020-01-21 RX ADMIN — OYSTER SHELL CALCIUM WITH VITAMIN D 1 TABLET: 500; 200 TABLET, FILM COATED ORAL at 08:47

## 2020-01-21 RX ADMIN — PANTOPRAZOLE SODIUM 40 MG: 40 TABLET, DELAYED RELEASE ORAL at 06:32

## 2020-01-21 RX ADMIN — SODIUM CHLORIDE, PRESERVATIVE FREE 10 ML: 5 INJECTION INTRAVENOUS at 03:28

## 2020-01-21 RX ADMIN — MECLIZINE HYDROCHLORIDE 25 MG: 25 TABLET ORAL at 08:46

## 2020-01-21 RX ADMIN — Medication 5000 UNITS: at 08:46

## 2020-01-21 RX ADMIN — ENOXAPARIN SODIUM 40 MG: 40 INJECTION SUBCUTANEOUS at 08:47

## 2020-01-21 RX ADMIN — OXYCODONE HYDROCHLORIDE AND ACETAMINOPHEN 1 TABLET: 7.5; 325 TABLET ORAL at 12:49

## 2020-01-21 RX ADMIN — OXYCODONE HYDROCHLORIDE AND ACETAMINOPHEN 1 TABLET: 7.5; 325 TABLET ORAL at 18:48

## 2020-01-21 RX ADMIN — TIZANIDINE 4 MG: 4 TABLET ORAL at 08:47

## 2020-01-21 RX ADMIN — PAROXETINE HYDROCHLORIDE 40 MG: 20 TABLET, FILM COATED ORAL at 20:36

## 2020-01-21 RX ADMIN — PROMETHAZINE HYDROCHLORIDE 25 MG: 25 TABLET ORAL at 09:33

## 2020-01-21 ASSESSMENT — PAIN SCALES - GENERAL
PAINLEVEL_OUTOF10: 8
PAINLEVEL_OUTOF10: 0
PAINLEVEL_OUTOF10: 7
PAINLEVEL_OUTOF10: 9

## 2020-01-21 NOTE — PROGRESS NOTES
(125.2 kg)   SpO2 96%   BMI 47.38 kg/m²   General appearance: less  Distress today ,   Respiratory:  symmetrical , good air entry , no Rales , No wheezing, or rhonchi,  Cardiovascular: RRR, with normal S1/S2 without murmurs. Abdomen : Soft, tender, non-distended  , normal bowel sounds. Legs : No edema bilaterally. No DVT signs ,    Neurologic:  Alert and oriented , no focal sensory/motor deficits , grossly non-focal.  Psych : very anxious       Labs:   Recent Labs     01/20/20  0732 01/21/20  0305 01/21/20  1109   WBC 5.1 4.1 5.2   HGB 9.2* 10.0* 9.9*   HCT 34.4* 36.4* 33.6*    BEING REORDERED TO REDRAW TO VERIFY COUNT 385     Recent Labs     01/19/20  0750 01/20/20  0732 01/21/20  1109    137 137   K 4.3 4.9 4.5    103 102   CO2 24 19* 23   BUN 9 12 12   CREATININE 0.7 0.7 0.8   CALCIUM 9.4 9.5 9.3     No results for input(s): AST, ALT, BILIDIR, BILITOT, ALKPHOS in the last 72 hours. No results for input(s): INR in the last 72 hours. No results for input(s): Boo Shall in the last 72 hours. Assessment/Plan:    Active Hospital Problems    Diagnosis Date Noted    Abdominal pain, epigastric [R10.13]     Dizziness [R42] 01/09/2020    Generalized abdominal pain [R10.84] 09/26/2015    Nausea & vomiting [R11.2] 04/19/2013   bacteremia MRSA         Plan:  Cont IV ABX . .. ... hand MRI with contrast order . Aureliano Appscends Blood c/s: MRSA . Aureliano Meres ABX IV . Aureliano Appscends Percocet to Q 4    ativan PO BID    stable  H/H  At 9.9   Symptomatic treatment Zofran, Phenergan PRN. ...h/h stable . Hopefully home tomorrow       Previously  :  Port out 1/15.2020  PILO : no concern   Brain MRI/MRA : both with no concern . .pt still dizzy with double vision . ...  neuro input noted . . plan to   CXR and lumbar XR: . Both non acute    Lumbar MRI : non acute . .. blood c/s: pending . . 2 D echo : no concern   . ..  plan for PILO   Hand CT : non acute       Diet: DIET LOW FAT;  Code Status: Full Code    Treatment progress and plan was d/w

## 2020-01-21 NOTE — PROGRESS NOTES
GENERAL SURGERY PROGRESS NOTE    CC/HPI:           Patient feels better from removal of her port surgery. Vitals:    01/20/20 0922 01/20/20 1500 01/20/20 2022 01/21/20 0402   BP: (!) 97/48 126/67 117/61 122/66   Pulse: 64 55 61 (!) 43   Resp: 14 16 19 16   Temp: 98.6 °F (37 °C) 99.1 °F (37.3 °C) 99.3 °F (37.4 °C) 98 °F (36.7 °C)   TempSrc: Oral Oral Oral Oral   SpO2:   98%    Weight:   291 lb 9.6 oz (132.3 kg) 276 lb (125.2 kg)   Height:         No intake/output data recorded. No intake/output data recorded. DIET LOW FAT; Recent Results (from the past 48 hour(s))   CBC    Collection Time: 01/19/20  7:50 AM   Result Value Ref Range    WBC 5.0 4.0 - 10.5 K/CU MM    RBC 3.45 (L) 4.2 - 5.4 M/CU MM    Hemoglobin 9.1 (L) 12.5 - 16.0 GM/DL    Hematocrit 31.9 (L) 37 - 47 %    MCV 92.5 78 - 100 FL    MCH 26.4 (L) 27 - 31 PG    MCHC 28.5 (L) 32.0 - 36.0 %    RDW 14.3 11.7 - 14.9 %    Platelets 533 866 - 718 K/CU MM    MPV 10.2 7.5 - 11.1 FL   Basic metabolic panel    Collection Time: 01/19/20  7:50 AM   Result Value Ref Range    Sodium 136 135 - 145 MMOL/L    Potassium 4.3 3.5 - 5.1 MMOL/L    Chloride 101 99 - 110 mMol/L    CO2 24 21 - 32 MMOL/L    Anion Gap 11 4 - 16    BUN 9 6 - 23 MG/DL    CREATININE 0.7 0.6 - 1.1 MG/DL    Glucose 104 (H) 70 - 99 MG/DL    Calcium 9.4 8.3 - 10.6 MG/DL    GFR Non-African American >60 >60 mL/min/1.73m2    GFR African American >60 >60 mL/min/1.73m2   Vancomycin, trough    Collection Time: 01/19/20  7:50 AM   Result Value Ref Range    Vancomycin Tr 22.7 (H) 10 - 20 UG/ML    DOSE AMOUNT DOSE AMT.  GIVEN - 1gm     DOSE TIME DOSE TIME GIVEN - 0800    CBC    Collection Time: 01/20/20  7:32 AM   Result Value Ref Range    WBC 5.1 4.0 - 10.5 K/CU MM    RBC 3.53 (L) 4.2 - 5.4 M/CU MM    Hemoglobin 9.2 (L) 12.5 - 16.0 GM/DL    Hematocrit 34.4 (L) 37 - 47 %    MCV 97.5 78 - 100 FL    MCH 26.1 (L) 27 - 31 PG    MCHC 26.7 (L) 32.0 - 36.0 %    RDW 14.1 11.7 - 14.9 %    Platelets 725 230 Oriented x 3, Non focal.  Abdomen: Soft, Benign, Non tender, Non distended, Positive bowel sounds. Incision: Nicely healing: No new erythema - unchanged, No purulent discharge. Active Problems:    Nausea & vomiting    Generalized abdominal pain    Dizziness    Abdominal pain, epigastric  Resolved Problems:    * No resolved hospital problems. *      Assessment and Plan:  Lianna Peres is a 44 y.o. female who is POD # 6 status post:  1) REMOVAL of her 6.6-Syriac, low profile Mediport from the right   subclavian vein approach. 2) Purulent discharge was sent for aerobic and anaerobic cultures.       Cultures positive for MRSA, consulted ID, and wants to wait for the results of the MRI to decide the length of treatment with IV Vanco before discharge. ID's note:  · Continue vancomycin,   · D/w TAURUS Urbano PharmD. Vancomycin Dose has been adjusted. Plan to treat for 2-4 weeks, but will wait for MRI hand before making final decision on duration. · MRI of the right hand with contrast.       Increase Ambulation to at least 4x/day walk in the hallways with assistance. Respiratory cha-operative care: Incentive Spirometry / deep breathing and coughing 10x/hr while awake. Continue DVT prophylaxis with Teds and SCDs and SC Lovenox. Continue GI prophylaxis with Protonix IV till able to tolerate PO. Continue IV Abx.    ___________________________________________    Cristhian Estrella MD, FACS, FICS  1/21/2020  5:59 AM    Patient was seen with total face to face time of 25 minutes. More than 50% of this visit was counseling and education as above in my assessment and plan section of my note.

## 2020-01-21 NOTE — PROGRESS NOTES
Infectious Disease Progress Note  2020   Patient Name: Gilbert Stone : 1970   Impression  · CRBSI MRSA bacteremia  ? Afebrile, but continues to complain about spinal pain and lower extremity weakness. Sometimes there is a delay in appearance of MRI features of disciitis   ? Vancomycin trough is 20 mcg/ml  ? MediPort removed on 1/15/19, MRSA positive  ? 2 sets of blood culture positive; blood culture  0/2  · Right hand middle finger dactylitis  ? R/o infection  ? May be lupus acting up  · Constipation  · Multi-morbidity: per PMHx obesity, Lupus, status post gastric sleeve surgery, recurrent emesis and hematemesis  Plan:  · Continue vancomycin (would prefer target trough closer to 15 mcg per ml)  · Plan to treat for 4 weeks  Weekly labs drawn on Monday during the course of treatment  CBC with differential, CMP, ESR, CRP,   Vancomycin Trough on  and Thursday  [facility]  · # [phone]  · MRI of the right hand with contrast pending (should be done before discharge), if non-infectious then will urge that a rheumatologist see her for lupus flare      Ongoing Antimicrobial Therapy  Vancomycin -? Completed Antimicrobial Therapy  Cefepime -? History:? Interval history noted. MRSA bacteremia  Continues to have right hand pain. Physical Exam:  Vital Signs: /66   Pulse (!) 43   Temp 98 °F (36.7 °C) (Oral)   Resp 16   Ht 5' 4\" (1.626 m)   Wt 276 lb (125.2 kg)   SpO2 96%   BMI 47.38 kg/m²     Gen: alert and oriented X3, no distress  Skin: no stigmata of endocarditis, appears flushed. Wounds: C/D/I  HEMT: AT/NC Oropharynx pink, moist, and without lesions or exudates; dentition in good state of repair  Eyes: PERRLA, EOMI, conjunctiva pink, sclera anicteric. Neck: Supple. Trachea midline. No LAD. Chest: no distress and CTA. Good air movement. Heart: RRR and no MRG.    Abd: soft, non-distended, no tenderness, no hepatomegaly. Normoactive bowel sounds. Ext: right hand swelling, no clubbing, cyanosis, or edema  Catheter Site: Left arm mid-line. Neuro: Mental status intact. CN 2-12 intact and no focal sensory or motor deficits  Musculoskeletal: Lower lumbar spine tenderness.      Radiologic / Imaging / TESTING  MRI of thoracolumbar spine     Labs:    Recent Results (from the past 24 hour(s))   CBC    Collection Time: 01/21/20  3:05 AM   Result Value Ref Range    WBC 4.1 4.0 - 10.5 K/CU MM    RBC 3.73 (L) 4.2 - 5.4 M/CU MM    Hemoglobin 10.0 (L) 12.5 - 16.0 GM/DL    Hematocrit 36.4 (L) 37 - 47 %    MCV 97.6 78 - 100 FL    MCH 26.8 (L) 27 - 31 PG    MCHC 27.5 (L) 32.0 - 36.0 %    RDW 14.0 11.7 - 14.9 %    Platelets BEING REORDERED TO REDRAW TO VERIFY COUNT 140 - 440 K/CU MM    MPV 12.0 (H) 7.5 - 11.1 FL     CULTURE results: Invalid input(s): BLOOD CULTURE,  URINE CULTURE, SURGICAL CULTURE    Diagnosis:  Patient Active Problem List   Diagnosis    Rectal bleeding    Fever    Upper GI bleed    Fibromyalgia    Lupus (systemic lupus erythematosus) (HCC)    Nausea & vomiting    Chest pain    Chronic pancreatitis (HCC)    HTN (hypertension)    Acute pancreatitis    Right-sided chest pain    Super obesity    Absolute anemia    Hypokalemia    Generalized abdominal pain    Pneumonia of both lungs due to infectious organism    Foot ulcer, left (HCC)    SLE (systemic lupus erythematosus related syndrome) (HCC)    Hypoxia    Cellulitis    Pancytopenia (HCC)    Pleurisy    Frequent UTI    Gastroesophageal reflux disease without esophagitis    MRSA cellulitis of left foot    Depression    Fatty liver    Fatty liver disease, nonalcoholic    Arthritis    Bilateral low back pain with left-sided sciatica    Morbid obesity with BMI of 50.0-59.9, adult (HCC)    Intractable vomiting with nausea    Class 3 obesity in adult    Hiatal hernia    Poor intravenous access    Post-operative nausea and vomiting    Status post bariatric surgery    Status post laparoscopic sleeve gastrectomy    Abscess    Dysphagia    Pseudoseizure    Chronic pain syndrome    Drug-seeking/Aberrant behavior    Acute conjunctivitis    Chronic osteomyelitis of left foot with draining sinus (HCC)    Receiving intravenous antibiotic treatment as outpatient    History of bacteremia    Abdominal pain, right upper quadrant    Sepsis due to Pseudomonas species ([address]    Bacteremia due to Pseudomonas    Catheter-related bloodstream infection    Hematemesis with nausea    Hematemesis    Lymph node disorder    Dizziness    Abdominal pain, epigastric       Active Problems  Active Problems:    Nausea & vomiting    Generalized abdominal pain    Dizziness    Abdominal pain, epigastric  Resolved Problems:    * No resolved hospital problems.  *    Electronically signed by: Electronically signed by Wendie Gonzalez MD on 1/21/2020 at 10:09 AM

## 2020-01-21 NOTE — PROGRESS NOTES
[address] [facility]  consulted by Dr. Mera Duke for monitoring and adjustment. Indication for treatment: CRBSI- MRSA   Goal trough: 15 mcg/mL  Other Antimicrobials:  none     Pertinent Laboratory Values:   Temp Readings from Last 3 Encounters:   01/21/20 98.1 °F (36.7 °C) (Oral)   10/31/19 98.1 °F (36.7 °C) (Oral)   10/16/19 97.8 °F (36.6 °C) (Oral)     Recent Labs     01/20/20  0732 01/21/20  0305 01/21/20  1109   WBC 5.1 4.1 5.2     Recent Labs     01/19/20  0750 01/20/20  0732   BUN 9 12   CREATININE 0.7 0.7     Estimated Creatinine Clearance: 124 mL/min (based on SCr of 0.7 mg/dL). No intake or output data in the 24 hours ending 01/21/20 1258    Pertinent Cultures:  Date    Source    Results  1/10   Blood    NGTD  1/13   Blood (2 of 2)   MRSA  1/15   Medi-Port   MRSA  1/15                             Surgical Cx                             MRSA  1/16   Blood    NGTD    Vancomycin level:   TROUGH:    Recent Labs     01/19/20  0750 01/21/20  1109   VANCOTROUGH 22.7* 20.0     RANDOM:  No results for input(s): VANCORANDOM in the last 72 hours. Assessment:  · WBC and temperature:  WNL, afebrile   · SCr, BUN, and urine output: previously stable  · Day(s) of therapy: 9  · Vancomycin level:   · 1/14 - 12.3 mcg/ml - (12 hours post-dose on 1000 mg q8h IVPB)  · 1/16 - 10.0 mcg/ml - (7 hours post-dose on 1000 mg q8h IVPB)   · 1/18 - 23.1 mcg/ml - (6 hours post-dose on 1,250 mg q8h IVPB, true trough ~19.9)  · 1/19 - 22.7 mcg/ml - (5 hours post-dose on 1,000 mg q8h IVPB, true trough ~18.2)  · 1/21 - 20.0 mcg/ml - (8 hours post-dose on 1,000 mg q8h IVPB)     Plan:  · Vancomycin has accumulated 2/2 BMI   · Decrease to vancomycin 750mg q8h   · Trough to be collected 1/22 @ 1630  · Pharmacy will continue to monitor patient and adjust therapy as indicated    Thank you for the consult.   Edita Venegas, [address]  1/21/2020 12:58 PM

## 2020-01-22 LAB
ANION GAP SERPL CALCULATED.3IONS-SCNC: 11 MMOL/L (ref 4–16)
BUN BLDV-MCNC: 12 MG/DL (ref 6–23)
CALCIUM SERPL-MCNC: 9.5 MG/DL (ref 8.3–10.6)
CHLORIDE BLD-SCNC: 104 MMOL/L (ref 99–110)
CO2: 24 MMOL/L (ref 21–32)
CREAT SERPL-MCNC: 0.7 MG/DL (ref 0.6–1.1)
DOSE AMOUNT: ABNORMAL
DOSE TIME: ABNORMAL
GFR AFRICAN AMERICAN: >60 ML/MIN/1.73M2
GFR NON-AFRICAN AMERICAN: >60 ML/MIN/1.73M2
GLUCOSE BLD-MCNC: 114 MG/DL (ref 70–99)
HCT VFR BLD CALC: 30.9 % (ref 37–47)
HEMOGLOBIN: 9.1 GM/DL (ref 12.5–16)
MCH RBC QN AUTO: 26.3 PG (ref 27–31)
MCHC RBC AUTO-ENTMCNC: 29.4 % (ref 32–36)
MCV RBC AUTO: 89.3 FL (ref 78–100)
PDW BLD-RTO: 14 % (ref 11.7–14.9)
PLATELET # BLD: 410 K/CU MM (ref 140–440)
PMV BLD AUTO: 9.6 FL (ref 7.5–11.1)
POTASSIUM SERPL-SCNC: 4.3 MMOL/L (ref 3.5–5.1)
RBC # BLD: 3.46 M/CU MM (ref 4.2–5.4)
SODIUM BLD-SCNC: 139 MMOL/L (ref 135–145)
VANCOMYCIN TROUGH: 21.2 UG/ML (ref 10–20)
WBC # BLD: 5.6 K/CU MM (ref 4–10.5)

## 2020-01-22 PROCEDURE — 80048 BASIC METABOLIC PNL TOTAL CA: CPT

## 2020-01-22 PROCEDURE — 2580000003 HC RX 258: Performed by: INTERNAL MEDICINE

## 2020-01-22 PROCEDURE — 1200000000 HC SEMI PRIVATE

## 2020-01-22 PROCEDURE — 94761 N-INVAS EAR/PLS OXIMETRY MLT: CPT

## 2020-01-22 PROCEDURE — 76937 US GUIDE VASCULAR ACCESS: CPT

## 2020-01-22 PROCEDURE — 6360000002 HC RX W HCPCS: Performed by: INTERNAL MEDICINE

## 2020-01-22 PROCEDURE — 6360000002 HC RX W HCPCS: Performed by: FAMILY MEDICINE

## 2020-01-22 PROCEDURE — 36592 COLLECT BLOOD FROM PICC: CPT

## 2020-01-22 PROCEDURE — 6360000002 HC RX W HCPCS: Performed by: SURGERY

## 2020-01-22 PROCEDURE — 99232 SBSQ HOSP IP/OBS MODERATE 35: CPT | Performed by: INTERNAL MEDICINE

## 2020-01-22 PROCEDURE — 02HV33Z INSERTION OF INFUSION DEVICE INTO SUPERIOR VENA CAVA, PERCUTANEOUS APPROACH: ICD-10-PCS | Performed by: FAMILY MEDICINE

## 2020-01-22 PROCEDURE — 36569 INSJ PICC 5 YR+ W/O IMAGING: CPT

## 2020-01-22 PROCEDURE — 80202 ASSAY OF VANCOMYCIN: CPT

## 2020-01-22 PROCEDURE — 85027 COMPLETE CBC AUTOMATED: CPT

## 2020-01-22 PROCEDURE — 6370000000 HC RX 637 (ALT 250 FOR IP): Performed by: FAMILY MEDICINE

## 2020-01-22 PROCEDURE — 6370000000 HC RX 637 (ALT 250 FOR IP): Performed by: SURGERY

## 2020-01-22 PROCEDURE — C1751 CATH, INF, PER/CENT/MIDLINE: HCPCS

## 2020-01-22 PROCEDURE — 6370000000 HC RX 637 (ALT 250 FOR IP): Performed by: PAIN MEDICINE

## 2020-01-22 RX ORDER — MORPHINE SULFATE 4 MG/ML
4 INJECTION, SOLUTION INTRAMUSCULAR; INTRAVENOUS EVERY 6 HOURS PRN
Status: DISCONTINUED | OUTPATIENT
Start: 2020-01-22 | End: 2020-01-23 | Stop reason: HOSPADM

## 2020-01-22 RX ADMIN — PAROXETINE HYDROCHLORIDE 40 MG: 20 TABLET, FILM COATED ORAL at 20:53

## 2020-01-22 RX ADMIN — TIZANIDINE 4 MG: 4 TABLET ORAL at 08:20

## 2020-01-22 RX ADMIN — LORAZEPAM 1 MG: 1 TABLET ORAL at 08:21

## 2020-01-22 RX ADMIN — OYSTER SHELL CALCIUM WITH VITAMIN D 1 TABLET: 500; 200 TABLET, FILM COATED ORAL at 18:15

## 2020-01-22 RX ADMIN — MORPHINE SULFATE 4 MG: 4 INJECTION, SOLUTION INTRAMUSCULAR; INTRAVENOUS at 20:53

## 2020-01-22 RX ADMIN — OXYCODONE HYDROCHLORIDE AND ACETAMINOPHEN 1 TABLET: 7.5; 325 TABLET ORAL at 07:16

## 2020-01-22 RX ADMIN — GABAPENTIN 800 MG: 400 CAPSULE ORAL at 20:53

## 2020-01-22 RX ADMIN — TIZANIDINE 4 MG: 4 TABLET ORAL at 20:53

## 2020-01-22 RX ADMIN — Medication 5000 UNITS: at 08:21

## 2020-01-22 RX ADMIN — SODIUM CHLORIDE, PRESERVATIVE FREE 10 ML: 5 INJECTION INTRAVENOUS at 10:20

## 2020-01-22 RX ADMIN — PANTOPRAZOLE SODIUM 40 MG: 40 TABLET, DELAYED RELEASE ORAL at 05:17

## 2020-01-22 RX ADMIN — OXYCODONE HYDROCHLORIDE AND ACETAMINOPHEN 1 TABLET: 7.5; 325 TABLET ORAL at 13:45

## 2020-01-22 RX ADMIN — PROMETHAZINE HYDROCHLORIDE 25 MG: 25 TABLET ORAL at 09:12

## 2020-01-22 RX ADMIN — ENOXAPARIN SODIUM 40 MG: 40 INJECTION SUBCUTANEOUS at 08:20

## 2020-01-22 RX ADMIN — VANCOMYCIN HYDROCHLORIDE 750 MG: 5 INJECTION, POWDER, LYOPHILIZED, FOR SOLUTION INTRAVENOUS at 20:53

## 2020-01-22 RX ADMIN — MECLIZINE HYDROCHLORIDE 25 MG: 25 TABLET ORAL at 20:53

## 2020-01-22 RX ADMIN — MECLIZINE HYDROCHLORIDE 25 MG: 25 TABLET ORAL at 13:45

## 2020-01-22 RX ADMIN — VANCOMYCIN HYDROCHLORIDE 750 MG: 5 INJECTION, POWDER, LYOPHILIZED, FOR SOLUTION INTRAVENOUS at 04:09

## 2020-01-22 RX ADMIN — ATENOLOL 25 MG: 25 TABLET ORAL at 08:21

## 2020-01-22 RX ADMIN — SODIUM CHLORIDE, PRESERVATIVE FREE 10 ML: 5 INJECTION INTRAVENOUS at 10:22

## 2020-01-22 RX ADMIN — MORPHINE SULFATE 4 MG: 4 INJECTION, SOLUTION INTRAMUSCULAR; INTRAVENOUS at 14:41

## 2020-01-22 RX ADMIN — DICYCLOMINE HYDROCHLORIDE 20 MG: 20 TABLET ORAL at 13:45

## 2020-01-22 RX ADMIN — DICYCLOMINE HYDROCHLORIDE 20 MG: 20 TABLET ORAL at 08:21

## 2020-01-22 RX ADMIN — MULTIPLE VITAMINS W/ MINERALS TAB 1 TABLET: TAB at 08:21

## 2020-01-22 RX ADMIN — SODIUM CHLORIDE, PRESERVATIVE FREE 10 ML: 5 INJECTION INTRAVENOUS at 14:41

## 2020-01-22 RX ADMIN — LIDOCAINE HYDROCHLORIDE 5 ML: 10 INJECTION, SOLUTION EPIDURAL; INFILTRATION; INTRACAUDAL; PERINEURAL at 08:31

## 2020-01-22 RX ADMIN — VANCOMYCIN HYDROCHLORIDE 750 MG: 5 INJECTION, POWDER, LYOPHILIZED, FOR SOLUTION INTRAVENOUS at 12:34

## 2020-01-22 RX ADMIN — OXYCODONE HYDROCHLORIDE AND ACETAMINOPHEN 1 TABLET: 7.5; 325 TABLET ORAL at 01:08

## 2020-01-22 RX ADMIN — FERROUS SULFATE TAB 325 MG (65 MG ELEMENTAL FE) 325 MG: 325 (65 FE) TAB at 08:21

## 2020-01-22 RX ADMIN — LEVOTHYROXINE SODIUM 125 MCG: 125 TABLET ORAL at 05:17

## 2020-01-22 RX ADMIN — DICYCLOMINE HYDROCHLORIDE 20 MG: 20 TABLET ORAL at 01:08

## 2020-01-22 RX ADMIN — GABAPENTIN 800 MG: 400 CAPSULE ORAL at 13:45

## 2020-01-22 RX ADMIN — OYSTER SHELL CALCIUM WITH VITAMIN D 1 TABLET: 500; 200 TABLET, FILM COATED ORAL at 08:20

## 2020-01-22 RX ADMIN — DICYCLOMINE HYDROCHLORIDE 20 MG: 20 TABLET ORAL at 20:53

## 2020-01-22 RX ADMIN — MECLIZINE HYDROCHLORIDE 25 MG: 25 TABLET ORAL at 08:20

## 2020-01-22 RX ADMIN — SODIUM CHLORIDE, PRESERVATIVE FREE 10 ML: 5 INJECTION INTRAVENOUS at 20:54

## 2020-01-22 RX ADMIN — SODIUM CHLORIDE, PRESERVATIVE FREE 10 ML: 5 INJECTION INTRAVENOUS at 10:21

## 2020-01-22 RX ADMIN — GABAPENTIN 800 MG: 400 CAPSULE ORAL at 08:21

## 2020-01-22 ASSESSMENT — PAIN SCALES - GENERAL
PAINLEVEL_OUTOF10: 10
PAINLEVEL_OUTOF10: 8
PAINLEVEL_OUTOF10: 10
PAINLEVEL_OUTOF10: 7
PAINLEVEL_OUTOF10: 10
PAINLEVEL_OUTOF10: 6

## 2020-01-22 NOTE — CONSULTS
Consult completed. Procedure/rationale explained to pt including benefits vs potential risks/complications; questions answered & consent obtained. Preexisting MidLine to LUE removed and #4Fr PowerPICC SOLO initiated to LUE Basilic Vein using sterile, UltraSound-guided technique without difficulty/complications. Sterile dressing with SkinPrep, StatLock Securing Device, BioPatch, SwabCap, and Limb Precautions band applied. Pt tolerated well & denies other c/o or needs. Donald Coates, Primary RN, notified.

## 2020-01-22 NOTE — PROGRESS NOTES
Infectious Disease Progress Note  2020   Patient Name: Janene Bloom : 1970   Impression  · CRBSI MRSA bacteremia  ? Afebrile, but continues to complain about spinal pain and lower extremity weakness. Sometimes there is a delay in appearance of MRI features of disciitis   ? Vancomycin trough is 20 mcg/ml  ? MediPort removed on 1/15/19, MRSA positive  ? 2 sets of blood culture positive; blood culture  0/2 ngtd  · Right hand middle finger dactylitis  ? MRI shows tenosynovitis, marrow changes seen: non-infectious osteitis vs early osteomyelitis. · Constipation  · Multi-morbidity: per PMHx obesity, Lupus, status post gastric sleeve surgery, recurrent emesis and hematemesis  Plan:  · Needs a hand surgeon to evaluate for tenosynovitis and early osteomyelitis  · Continue vancomycin (would prefer target trough closer to 15 mcg per ml)  · Plan to treat for 4 weeks  Weekly labs drawn on Monday during the course of treatment  CBC with differential, CMP, ESR, CRP,   Vancomycin Trough on  and Thursday  Fax results to Attn: [facility] Staff  · # [phone]        Ongoing Antimicrobial Therapy  Vancomycin -? Completed Antimicrobial Therapy  Cefepime -? History:? Interval history noted. MRSA bacteremia  Continues to have right hand pain. Physical Exam:  Vital Signs: /65   Pulse 60   Temp 98 °F (36.7 °C) (Oral)   Resp 20   Ht 5' 4\" (1.626 m)   Wt 276 lb (125.2 kg)   SpO2 96%   BMI 47.38 kg/m²     Gen: alert and oriented X3, no distress  Skin: no stigmata of endocarditis, appears flushed. Wounds: C/D/I  HEMT: AT/NC Oropharynx pink, moist, and without lesions or exudates; dentition in good state of repair  Eyes: PERRLA, EOMI, conjunctiva pink, sclera anicteric. Neck: Supple. Trachea midline. No LAD. Chest: no distress and CTA. Good air movement. Heart: RRR and no MRG. Abd: soft, non-distended, no tenderness, no hepatomegaly.  Normoactive bowel sounds. Ext: right hand swelling, no clubbing, cyanosis, or edema  Catheter Site: Left arm mid-line. Neuro: Mental status intact. CN 2-12 intact and no focal sensory or motor deficits  Musculoskeletal: Lower lumbar spine tenderness. Radiologic / Imaging / TESTING  MRI of thoracolumbar spine     Labs:    Recent Results (from the past 24 hour(s))   Vancomycin, trough    Collection Time: 01/21/20 11:09 AM   Result Value Ref Range    Vancomycin Tr 20.0 10 - 20 UG/ML    DOSE AMOUNT DOSE AMT.  GIVEN - =     DOSE TIME DOSE TIME GIVEN - 0900    CBC    Collection Time: 01/21/20 11:09 AM   Result Value Ref Range    WBC 5.2 4.0 - 10.5 K/CU MM    RBC 3.78 (L) 4.2 - 5.4 M/CU MM    Hemoglobin 9.9 (L) 12.5 - 16.0 GM/DL    Hematocrit 33.6 (L) 37 - 47 %    MCV 88.9 78 - 100 FL    MCH 26.2 (L) 27 - 31 PG    MCHC 29.5 (L) 32.0 - 36.0 %    RDW 14.0 11.7 - 14.9 %    Platelets 196 561 - 174 K/CU MM    MPV 9.9 7.5 - 11.1 FL   Basic Metabolic Panel    Collection Time: 01/21/20 11:09 AM   Result Value Ref Range    Sodium 137 135 - 145 MMOL/L    Potassium 4.5 3.5 - 5.1 MMOL/L    Chloride 102 99 - 110 mMol/L    CO2 23 21 - 32 MMOL/L    Anion Gap 12 4 - 16    BUN 12 6 - 23 MG/DL    CREATININE 0.8 0.6 - 1.1 MG/DL    Glucose 106 (H) 70 - 99 MG/DL    Calcium 9.3 8.3 - 10.6 MG/DL    GFR Non-African American >60 >60 mL/min/1.73m2    GFR African American >60 >60 mL/min/1.73m2     CULTURE results: Invalid input(s): BLOOD CULTURE,  URINE CULTURE, SURGICAL CULTURE    Diagnosis:  Patient Active Problem List   Diagnosis    Rectal bleeding    Fever    Upper GI bleed    Fibromyalgia    Lupus (systemic lupus erythematosus) (HCC)    Nausea & vomiting    Chest pain    Chronic pancreatitis (HCC)    HTN (hypertension)    Acute pancreatitis    Right-sided chest pain    Super obesity    Absolute anemia    Hypokalemia    Generalized abdominal pain    Pneumonia of both lungs due to infectious organism    Foot ulcer, left (HCC)    SLE (systemic lupus erythematosus related syndrome) (HCC)    Hypoxia    Cellulitis    Pancytopenia (HCC)    Pleurisy    Frequent UTI    Gastroesophageal reflux disease without esophagitis    MRSA cellulitis of left foot    Depression    Fatty liver    Fatty liver disease, nonalcoholic    Arthritis    Bilateral low back pain with left-sided sciatica    Morbid obesity with BMI of 50.0-59.9, adult (HCC)    Intractable vomiting with nausea    Class 3 obesity in adult    Hiatal hernia    Poor intravenous access    Post-operative nausea and vomiting    Status post bariatric surgery    Status post laparoscopic sleeve gastrectomy    Abscess    Dysphagia    Pseudoseizure    Chronic pain syndrome    Drug-seeking/Aberrant behavior    Acute conjunctivitis    Chronic osteomyelitis of left foot with draining sinus (HCC)    Receiving intravenous antibiotic treatment as outpatient    History of bacteremia    Abdominal pain, right upper quadrant    Sepsis due to Pseudomonas species ([address]    Bacteremia due to Pseudomonas    Catheter-related bloodstream infection    Hematemesis with nausea    Hematemesis    Lymph node disorder    Dizziness    Abdominal pain, epigastric       Active Problems  Active Problems:    Nausea & vomiting    Generalized abdominal pain    Dizziness    Abdominal pain, epigastric  Resolved Problems:    * No resolved hospital problems.  *    Electronically signed by: Electronically signed by La López MD on 1/22/2020 at 7:52 AM

## 2020-01-22 NOTE — PROGRESS NOTES
Attending Progress Note      PCP: Richard Deras MD    Patient: Selena Landry   Gender: female  : 1970   Age: 44 y.o. MRN: [id number]      Date of Admission: 2020    Chief Complaint:   Chief Complaint   Patient presents with    Dizziness     onset 3-4 days ago. reports dizziness and blurry vision    Abdominal Pain     hx of pancreatitis, reports right upper abd pain radiating around to her back with N/V/D           Subjective: rt 3 ed finger  Painful  Swelling . ... lumbar pian . Kenia Hooker pt is willing to go [place]   No chills , no N/V/diarrhea     Medications:  Reviewed  Infusion Medications     Scheduled Medications    vancomycin  750 mg Intravenous Q8H    sodium chloride flush  10 mL Intravenous 2 times per day    lidocaine PF  5 mL Intradermal Once    sodium chloride flush  10 mL Intravenous 2 times per day    sodium chloride flush  10 mL Intravenous 2 times per day    sodium chloride flush  10 mL Intravenous 2 times per day    meclizine  25 mg Oral TID    PARoxetine  40 mg Oral Nightly    gabapentin  800 mg Oral TID    levothyroxine  125 mcg Oral Daily    therapeutic multivitamin-minerals  1 tablet Oral Daily    dicyclomine  20 mg Oral Q6H    atenolol  25 mg Oral Daily    tiZANidine  4 mg Oral BID    ferrous sulfate  325 mg Oral BID    vitamin D  5,000 Units Oral Daily    pantoprazole  40 mg Oral QAM AC    enoxaparin  40 mg Subcutaneous Daily    calcium-vitamin D  1 tablet Oral BID WC     PRN Meds: sodium chloride flush, oxyCODONE-acetaminophen, oxyCODONE-acetaminophen, sodium chloride flush, LORazepam, sodium chloride flush, sodium chloride flush, acetaminophen, diphenhydrAMINE, ondansetron, promethazine, magnesium hydroxide, ondansetron, LORazepam    No intake or output data in the 24 hours ending 20 1149    Exam:  /74   Pulse 58   Temp 98.2 °F (36.8 °C) (Oral)   Resp 17   Ht 5' 4\" (1.626 m)   Wt 276 lb (125.2 kg)   SpO2 96%   BMI 47.38 kg/m²   General appearance: less  Distress today ,   Respiratory:  symmetrical , good air entry , no Rales , No wheezing, or rhonchi,  Cardiovascular: RRR, with normal S1/S2 without murmurs. Abdomen : Soft, tender, non-distended  , normal bowel sounds. Legs : No edema bilaterally. No DVT signs ,    Neurologic:  Alert and oriented , no focal sensory/motor deficits , grossly non-focal.  Psych :  anxious       Labs:   Recent Labs     01/21/20  0305 01/21/20  1109 01/22/20  0600   WBC 4.1 5.2 5.6   HGB 10.0* 9.9* 9.1*   HCT 36.4* 33.6* 30.9*   PLT BEING REORDERED TO REDRAW TO VERIFY COUNT 385 [id number]     Recent Labs     01/20/20  0732 01/21/20  1109 01/22/20  0600    137 139   K 4.9 4.5 4.3    102 104   CO2 19* 23 24   BUN 12 12 12   CREATININE 0.7 0.8 0.7   CALCIUM 9.5 9.3 9.5     No results for input(s): AST, ALT, BILIDIR, BILITOT, ALKPHOS in the last 72 hours. No results for input(s): INR in the last 72 hours. No results for input(s): Erven Sell in the last 72 hours. Assessment/Plan:    Active Hospital Problems    Diagnosis Date Noted    Abdominal pain, epigastric [R10.13]     Dizziness [R42] 01/09/2020    Generalized abdominal pain [R10.84] 09/26/2015    Nausea & vomiting [R11.2] 04/19/2013   bacteremia MRSA         Plan:  D/w pt . . rheumatology consult   Cont IV ABX . .. ... hand MRI with contrast noted with  Tenosynovitis of the 3rd flexor tendon sheath. ..possible  reactive noninfectious osteitis . ... but osteomyelitis not excluded . Paty Pulido Blood c/s: MRSA . Paty Silk ABX IV . Paty Pulido Percocet to Q 4    ativan PO BID    stable  H/H  At 9.9   Symptomatic treatment Zofran, Phenergan PRN. ...h/h stable . Previously  :  Port out 1/15.2020  PILO : no concern   Brain MRI/MRA : both with no concern . .pt still dizzy with double vision . ...  neuro input noted . . plan to   CXR and lumbar XR: . Both non acute    Lumbar MRI : non acute . .. blood c/s: pending . . 2 D echo : no concern   . ..  plan for PILO   Hand CT : non acute Diet: DIET LOW FAT;  Code Status: Full Code    Treatment progress and plan was d/w pt/family .         Gabrielle Trinidad MD

## 2020-01-23 VITALS
DIASTOLIC BLOOD PRESSURE: 80 MMHG | SYSTOLIC BLOOD PRESSURE: 155 MMHG | WEIGHT: 276 LBS | OXYGEN SATURATION: 99 % | BODY MASS INDEX: 47.12 KG/M2 | HEART RATE: 77 BPM | TEMPERATURE: 98.2 F | HEIGHT: 64 IN | RESPIRATION RATE: 19 BRPM

## 2020-01-23 LAB
ANION GAP SERPL CALCULATED.3IONS-SCNC: 11 MMOL/L (ref 4–16)
BUN BLDV-MCNC: 11 MG/DL (ref 6–23)
CALCIUM SERPL-MCNC: 9.7 MG/DL (ref 8.3–10.6)
CHLORIDE BLD-SCNC: 104 MMOL/L (ref 99–110)
CO2: 25 MMOL/L (ref 21–32)
CREAT SERPL-MCNC: 0.8 MG/DL (ref 0.6–1.1)
GFR AFRICAN AMERICAN: >60 ML/MIN/1.73M2
GFR NON-AFRICAN AMERICAN: >60 ML/MIN/1.73M2
GLUCOSE BLD-MCNC: 120 MG/DL (ref 70–99)
HCT VFR BLD CALC: 33.7 % (ref 37–47)
HEMOGLOBIN: 10.2 GM/DL (ref 12.5–16)
MCH RBC QN AUTO: 26.6 PG (ref 27–31)
MCHC RBC AUTO-ENTMCNC: 30.3 % (ref 32–36)
MCV RBC AUTO: 88 FL (ref 78–100)
PDW BLD-RTO: 13.8 % (ref 11.7–14.9)
PLATELET # BLD: 407 K/CU MM (ref 140–440)
PMV BLD AUTO: 9.4 FL (ref 7.5–11.1)
POTASSIUM SERPL-SCNC: 4.2 MMOL/L (ref 3.5–5.1)
RBC # BLD: 3.83 M/CU MM (ref 4.2–5.4)
SODIUM BLD-SCNC: 140 MMOL/L (ref 135–145)
WBC # BLD: 7.3 K/CU MM (ref 4–10.5)

## 2020-01-23 PROCEDURE — 6370000000 HC RX 637 (ALT 250 FOR IP): Performed by: SURGERY

## 2020-01-23 PROCEDURE — 2580000003 HC RX 258: Performed by: INTERNAL MEDICINE

## 2020-01-23 PROCEDURE — 6360000002 HC RX W HCPCS: Performed by: FAMILY MEDICINE

## 2020-01-23 PROCEDURE — 6370000000 HC RX 637 (ALT 250 FOR IP): Performed by: PAIN MEDICINE

## 2020-01-23 PROCEDURE — 99232 SBSQ HOSP IP/OBS MODERATE 35: CPT | Performed by: INTERNAL MEDICINE

## 2020-01-23 PROCEDURE — 80048 BASIC METABOLIC PNL TOTAL CA: CPT

## 2020-01-23 PROCEDURE — 94761 N-INVAS EAR/PLS OXIMETRY MLT: CPT

## 2020-01-23 PROCEDURE — 2580000003 HC RX 258: Performed by: SURGERY

## 2020-01-23 PROCEDURE — 2580000003 HC RX 258: Performed by: FAMILY MEDICINE

## 2020-01-23 PROCEDURE — 6360000002 HC RX W HCPCS: Performed by: SURGERY

## 2020-01-23 PROCEDURE — 82565 ASSAY OF CREATININE: CPT

## 2020-01-23 PROCEDURE — 6360000002 HC RX W HCPCS: Performed by: INTERNAL MEDICINE

## 2020-01-23 PROCEDURE — 85027 COMPLETE CBC AUTOMATED: CPT

## 2020-01-23 RX ADMIN — MORPHINE SULFATE 4 MG: 4 INJECTION, SOLUTION INTRAMUSCULAR; INTRAVENOUS at 04:04

## 2020-01-23 RX ADMIN — MECLIZINE HYDROCHLORIDE 25 MG: 25 TABLET ORAL at 09:29

## 2020-01-23 RX ADMIN — DICYCLOMINE HYDROCHLORIDE 20 MG: 20 TABLET ORAL at 09:28

## 2020-01-23 RX ADMIN — DICYCLOMINE HYDROCHLORIDE 20 MG: 20 TABLET ORAL at 02:07

## 2020-01-23 RX ADMIN — OXYCODONE HYDROCHLORIDE AND ACETAMINOPHEN 1 TABLET: 7.5; 325 TABLET ORAL at 01:04

## 2020-01-23 RX ADMIN — OYSTER SHELL CALCIUM WITH VITAMIN D 1 TABLET: 500; 200 TABLET, FILM COATED ORAL at 09:28

## 2020-01-23 RX ADMIN — SODIUM CHLORIDE, PRESERVATIVE FREE 10 ML: 5 INJECTION INTRAVENOUS at 09:30

## 2020-01-23 RX ADMIN — FERROUS SULFATE TAB 325 MG (65 MG ELEMENTAL FE) 325 MG: 325 (65 FE) TAB at 09:29

## 2020-01-23 RX ADMIN — MULTIPLE VITAMINS W/ MINERALS TAB 1 TABLET: TAB at 09:29

## 2020-01-23 RX ADMIN — MORPHINE SULFATE 4 MG: 4 INJECTION, SOLUTION INTRAMUSCULAR; INTRAVENOUS at 10:25

## 2020-01-23 RX ADMIN — PANTOPRAZOLE SODIUM 40 MG: 40 TABLET, DELAYED RELEASE ORAL at 05:13

## 2020-01-23 RX ADMIN — VANCOMYCIN HYDROCHLORIDE 750 MG: 5 INJECTION, POWDER, LYOPHILIZED, FOR SOLUTION INTRAVENOUS at 04:04

## 2020-01-23 RX ADMIN — SODIUM CHLORIDE, PRESERVATIVE FREE 10 ML: 5 INJECTION INTRAVENOUS at 09:27

## 2020-01-23 RX ADMIN — ENOXAPARIN SODIUM 40 MG: 40 INJECTION SUBCUTANEOUS at 09:29

## 2020-01-23 RX ADMIN — VANCOMYCIN HYDROCHLORIDE 750 MG: 5 INJECTION, POWDER, LYOPHILIZED, FOR SOLUTION INTRAVENOUS at 13:03

## 2020-01-23 RX ADMIN — ATENOLOL 25 MG: 25 TABLET ORAL at 09:28

## 2020-01-23 RX ADMIN — GABAPENTIN 800 MG: 400 CAPSULE ORAL at 09:29

## 2020-01-23 RX ADMIN — Medication 5000 UNITS: at 09:28

## 2020-01-23 RX ADMIN — OXYCODONE HYDROCHLORIDE AND ACETAMINOPHEN 1 TABLET: 7.5; 325 TABLET ORAL at 15:04

## 2020-01-23 RX ADMIN — LEVOTHYROXINE SODIUM 125 MCG: 125 TABLET ORAL at 05:13

## 2020-01-23 RX ADMIN — TIZANIDINE 4 MG: 4 TABLET ORAL at 09:28

## 2020-01-23 ASSESSMENT — PAIN DESCRIPTION - PAIN TYPE: TYPE: CHRONIC PAIN

## 2020-01-23 ASSESSMENT — PAIN SCALES - GENERAL
PAINLEVEL_OUTOF10: 7
PAINLEVEL_OUTOF10: 7
PAINLEVEL_OUTOF10: 8
PAINLEVEL_OUTOF10: 8
PAINLEVEL_OUTOF10: 0
PAINLEVEL_OUTOF10: 7

## 2020-01-23 NOTE — DISCHARGE SUMMARY
still dizzy with double vision . ...  neuro input noted . . plan to   CXR and lumbar XR: . Both non acute    Lumbar MRI : non acute . .. blood c/s: pending . . 2 D echo : no concern   . .. plan for PILO   Hand CT : non acute               Consults. IP CONSULT TO PRIMARY CARE PROVIDER  IP CONSULT TO NEUROLOGY  IP CONSULT TO INFECTIOUS DISEASES  PHARMACY TO DOSE VANCOMYCIN  IP CONSULT TO IV TEAM  IP CONSULT TO CARDIOLOGY  IP CONSULT TO GENERAL SURGERY  IP CONSULT TO NEUROLOGY  IP CONSULT TO IV TEAM  IP CONSULT TO IV TEAM  IP CONSULT TO RHEUMATOLOGY        Discharge Medications:   Current Discharge Medication List      START taking these medications    Details   vancomycin (VANCOCIN) infusion Infuse 1,000 mg intravenously every 8 hours Compound per protocol. Qty: 12142 mg, Refills: 0      meclizine (ANTIVERT) 25 MG tablet Take 1 tablet by mouth 3 times daily for 10 days  Qty: 30 tablet, Refills: 1           Current Discharge Medication List        Current Discharge Medication List      CONTINUE these medications which have NOT CHANGED    Details   promethazine (PHENERGAN) 25 MG tablet Take 1 tablet by mouth every 6 hours as needed for Nausea  Qty: 30 tablet, Refills: 2      pantoprazole (PROTONIX) 40 MG tablet Take 1 tablet by mouth every morning (before breakfast)  Qty: 90 tablet, Refills: 1      levETIRAcetam (KEPPRA) 1000 MG tablet Take 1 tablet by mouth 2 times daily  Qty: 60 tablet, Refills: 3      lacosamide (VIMPAT) 50 MG TABS tablet Take 1 tablet by mouth 2 times daily for 14 days. Qty: 28 tablet, Refills: 0    Associated Diagnoses: Tonic clonic convulsion (HCC)      oxyCODONE-acetaminophen (PERCOCET) 5-325 MG per tablet Take 1 tablet by mouth every 8 hours as needed for Pain.        Nutritional Supplements (ENSURE HIGH PROTEIN) LIQD Take 1 Can by mouth Daily with lunch  Qty: 32 Can, Refills: 3      ondansetron (ZOFRAN ODT) 4 MG disintegrating tablet Take 1 tablet by mouth every 8 hours as needed for Nausea or Vomiting  Qty: 30 tablet, Refills: 2      Cholecalciferol (VITAMIN D3) 5000 units TABS Take 1 tablet by mouth daily      ferrous sulfate (FE TABS) 325 (65 Fe) MG EC tablet Take 1 tablet by mouth 2 times daily  Qty: 60 tablet, Refills: 5      tiZANidine (ZANAFLEX) 4 MG tablet Take 4 mg by mouth 2 times daily      estradiol (ESTRACE) 0.5 MG tablet Take 0.5 mg by mouth daily      dicyclomine (BENTYL) 20 MG tablet Take 20 mg by mouth every 6 hours      atenolol (TENORMIN) 25 MG tablet Take 25 mg by mouth daily      Multiple Vitamin (MVI, BARIATRIC ADVANTAGE MULTI-FORMULA, CHEW TAB) Take 1 tablet by mouth daily  Qty: 30 tablet, Refills: 5      Calcium Citrate-Vitamin D (CALCIUM + VIT D, BARIATRIC ADVANTAGE, CHEWABLE TABLET) Take 1 tablet by mouth 2 times daily  Qty: 120 tablet, Refills: 5      levothyroxine (SYNTHROID) 125 MCG tablet Take 1 tablet by mouth Daily  Qty: 30 tablet, Refills: 0      gabapentin (NEURONTIN) 800 MG tablet Take 800 mg by mouth 3 times daily      PARoxetine (PAXIL) 30 MG tablet Take 40 mg by mouth nightly            Current Discharge Medication List          Discharge ROS:  A complete review of systems was asked and negative except for back pain. Discharge Exam:    BP (!) 155/80   Pulse 77   Temp 98.2 °F (36.8 °C) (Oral)   Resp 19   Ht 5' 4\" (1.626 m)   Wt 276 lb (125.2 kg)   SpO2 99%   BMI 47.38 kg/m²   General appearance:  NAD  Heart[de-identified] Normal s1/s2, RRR, no murmurs, gallops, or rubs. No leg edema  Lungs:  Clear to auscultation, bilaterally without Rales/Wheezes/Rhonchi. Abdomen: Soft, non-tender, non-distended, bowel sounds present  Musculoskeletal:   no cyanosis, no edema  Neurologic:  Cranial nerves: II-XII intact, grossly non-focal.  Psychiatric:  A & O x3      Labs:  For convenience and continuity at follow-up the following most recent labs are provided:    Lab Results   Component Value Date    WBC 7.3 01/23/2020    HGB 10.2 01/23/2020    HCT 33.7 01/23/2020    MCV 88.0 01/23/2020  01/23/2020     01/23/2020    K 4.2 01/23/2020     01/23/2020    CO2 25 01/23/2020    BUN 11 01/23/2020    CREATININE 0.8 01/23/2020    CALCIUM 9.7 01/23/2020    PHOS 4.4 12/04/2015    BNP 88 11/26/2010    ALKPHOS 121 01/09/2020    ALT 20 01/09/2020    AST 28 01/09/2020    BILITOT 0.3 01/09/2020    BILIDIR 0.2 01/02/2016    LABALBU 3.8 01/09/2020    LABALBU 8 11/18/2015    TRIG 161 07/23/2015     Lab Results   Component Value Date    INR 0.93 01/10/2020    INR ? 01/09/2020    INR 0.93 10/31/2019           Chart review shows recent radiographs:  Xr Lumbar Spine (2-3 Views)    Result Date: 1/13/2020  EXAMINATION: THREE XRAY VIEWS OF THE LUMBAR SPINE 1/13/2020 7:12 pm COMPARISON: 09/11/2018 HISTORY: ORDERING SYSTEM PROVIDED HISTORY: pain TECHNOLOGIST PROVIDED HISTORY: Reason for exam:->pain Reason for Exam: lumbar pain Acuity: Acute Type of Exam: Initial Additional signs and symptoms: na Relevant Medical/Surgical History: lupus, copd, hypertension FINDINGS: 5 non-rib-bearing lumbar type vertebral bodies are present. There is no acute fracture or suspect osseous lesion. Vertebral body heights are maintained. Alignment is normal.  There is unchanged mild L5-S1 disc height loss and mild multilevel anterior endplate spurring. Mild L4-5 and L5-S1 facet hypertrophy is present. There is no acute soft tissue abnormality. Cholecystectomy clips are present. 1. No acute osseous abnormality of the lumbar spine. 2. Mild degenerative changes greatest at L5-S1.      Xr Hand Right (min 3 Views)    Result Date: 1/16/2020  EXAMINATION: THREE XRAY VIEWS OF THE RIGHT HAND 1/16/2020 6:15 pm COMPARISON: CT hand 01/14/2020 HISTORY: ORDERING SYSTEM PROVIDED HISTORY: painful swelling TECHNOLOGIST PROVIDED HISTORY: Reason for exam:->painful swelling Reason for Exam: painful swelling Acuity: Acute Type of Exam: Initial Additional signs and symptoms: none Relevant Medical/Surgical History: none FINDINGS: Flexion cord signal is seen. SOFT TISSUES:  No abnormal enhancement of the thoracic spine. No paraspinal mass identified. DEGENERATIVE CHANGES: No significant spinal canal stenosis or neural foraminal narrowing of the thoracic spine. Limited exam secondary to patient motion artifact There is no gross evidence for osteomyelitis/discitis within the thoracic spine     Mri Lumbar Spine W Wo Contrast    Result Date: 1/13/2020  EXAMINATION: MRI OF THE LUMBAR SPINE WITHOUT AND WITH CONTRAST  1/13/2020 4:48 pm TECHNIQUE: Multiplanar multisequence MRI of the lumbar spine was performed without and with the administration of intravenous contrast. COMPARISON: None. HISTORY: ORDERING SYSTEM PROVIDED HISTORY: lower back pain,feve. evaluate for disciitis and osteomyelitis. TECHNOLOGIST PROVIDED HISTORY: Reason for exam:->lower back pain,feve. evaluate for disciitis and osteomyelitis. Is the patient pregnant?->No FINDINGS: BONES/ALIGNMENT: There is normal alignment of the spine. The vertebral body heights are maintained. The bone marrow signal appears unremarkable. No acute fracture is identified SPINAL CORD:  The conus terminates normally. SOFT TISSUES: No abnormal enhancement is seen of the lumbar spine. No paraspinal mass identified. Shallow disc bulge identified T10-11 and T11-12 L1-L2: There is no significant disc protrusion, spinal canal stenosis or neural foraminal narrowing. L2-L3: There is no significant disc protrusion, spinal canal stenosis or neural foraminal narrowing. L3-L4: Annular disc bulge flattens the thecal sac.  L4-L5: Annular disc bulge facet arthropathy and ligament hypertrophy results in mild central canal stenosis L5-S1: Annular disc bulge facet arthropathy and ligament hypertrophy results in moderate bilateral foraminal stenosis in mild central canal stenosis There is mild patient motion artifact     Mild central canal stenosis at L4-L5 Mild central canal stenosis and moderate bilateral foraminal stenosis at OF THE RIGHT HAND WITHOUT CONTRAST, 1/14/2020 3:03 pm TECHNIQUE: CT of the right hand was performed without the administration of intravenous contrast.  Multiplanar reformatted images are provided for review. Dose modulation, iterative reconstruction, and/or weight based adjustment of the mA/kV was utilized to reduce the radiation dose to as low as reasonably achievable. COMPARISON: Radiographs October 27, 2010 HISTORY ORDERING SYSTEM PROVIDED HISTORY: Tenosynovitis TECHNOLOGIST PROVIDED HISTORY: Reason for exam:->Tenosynovitis Is the patient pregnant?->No Reason for Exam: Tenosynovitis Acuity: Acute Type of Exam: Initial Relevant Medical/Surgical History: None FINDINGS: No acute fracture. Narrowing of the interphalangeal joint spaces. Mild narrowing of the 1st carpometacarpal joint space. No erosive changes. No acute soft tissue abnormality. Mild osteoarthritis. Mri Hand Right Wo Contrast    Result Date: 1/21/2020  EXAMINATION: MRI OF THE RIGHT HAND WITHOUT CONTRAST, 1/21/2020 5:22 pm TECHNIQUE: Multiplanar multisequence MRI of the right hand was performed without the administration of intravenous contrast. COMPARISON: Right hand radiograph January 16, 2020; CT right hand January 14, 2020 HISTORY: ORDERING SYSTEM PROVIDED HISTORY: right hand middle finger swelling. TECHNOLOGIST PROVIDED HISTORY: Reason for exam:->right hand middle finger swelling. Is the patient pregnant?->No Reason for Exam: pt hand swollen with 3rd phanlange and mc swelling redness pain - pt unable to hold still for test due to pain in hips legs and shoulders - moved out of postion and repositioned - unable to finish contrast due to pain best images possible FINDINGS: Image quality is degraded secondary to motion artifact. SOFT TISSUES: Subcutaneous edema throughout the dorsal soft tissues and extending throughout the soft tissues predominantly of the 3rd digit. No organized drainable subcutaneous collection is identified.  Mild thickening of the 3rd flexor tendons with fluid tracking along the 3rd flexor tendon sheath at the level of the hand and proximal 3rd digit consistent with tenosynovitis. Flexor and extensor tendons are grossly intact. Evaluation limited due to motion artifact. BONE MARROW: Patchy marrow edema of the proximal 3rd phalanx which is most pronounced at the base. The T1 marrow signal appears grossly preserved. Findings may reflect reactive noninfectious osteitis, however, early changes of osteomyelitis can not be excluded. JOINTS: No significant joint effusion identified. Mild osteoarthritic changes of the MTP joints and mild-to-moderate degenerative changes of the interphalangeal joints of the hand. Mild degenerative change also present the 1st carpometacarpal joint and moderate degenerative changes are noted at the triscaphe joint. 1. Limited examination due to motion artifact. 2. Subcutaneous edema predominant involving the dorsal soft tissues of the hand and 3rd digit. Correlate for cellulitis. No organized drainable collection within limits of the exam. 3. Tenosynovitis of the 3rd flexor tendon sheath. 4. Patchy edema of the proximal 3rd phalanx with grossly preserved T1 signal. Findings may reflect reactive noninfectious osteitis, however, early changes of osteomyelitis not excluded. 5. Mild-to-moderate osteoarthritis. Xr Chest 1 Vw    Result Date: 1/13/2020  EXAMINATION: ONE XRAY VIEW OF THE CHEST 1/13/2020 7:12 pm COMPARISON: Chest radiograph 10/31/2019. HISTORY: ORDERING SYSTEM PROVIDED HISTORY: fever TECHNOLOGIST PROVIDED HISTORY: Reason for exam:->fever Reason for Exam: fever Acuity: Acute Type of Exam: Initial Additional signs and symptoms: na Relevant Medical/Surgical History: lupus, morbid obesity, copd FINDINGS: A right chest port terminates in the mid superior vena cava. The cardiomediastinal silhouette is unchanged.   No pneumothorax, vascular congestion, consolidation, or pleural effusion is identified. No acute osseous abnormality. No acute process. Mri Brain Wo Contrast    Result Date: 1/10/2020  EXAMINATION: MRI OF THE BRAIN WITHOUT CONTRAST  1/9/2020 11:11 pm TECHNIQUE: Multiplanar multisequence MRI of the brain was performed without the administration of intravenous contrast. COMPARISON: CT head without contrast January 9, 2020. HISTORY: ORDERING SYSTEM PROVIDED HISTORY: dizziness for three days TECHNOLOGIST PROVIDED HISTORY: Reason for exam:->dizziness for three days FINDINGS: INTRACRANIAL STRUCTURES/VENTRICLES: There is no acute infarct. No mass effect or midline shift. No evidence of an acute intracranial hemorrhage. The ventricles and sulci are normal in size and configuration. The sellar/suprasellar regions appear unremarkable. The normal signal voids within the major intracranial vessels appear maintained. ORBITS: The visualized portion of the orbits demonstrate no acute abnormality. SINUSES: The visualized paranasal sinuses and mastoid air cells are well aerated. BONES/SOFT TISSUES: The bone marrow signal intensity appears normal. The soft tissues demonstrate no acute abnormality. Unremarkable noncontrast MRI brain examination. EKG     Rhythm: normal sinus   Rate: normal  Clinical Impression: no acute changes        The patient was seen and examined on day of discharge and this discharge summary is in conjunction with any daily progress note from day of discharge. Time Spent on discharge is   >35  min  in the examination, evaluation, counseling and review of medications and discharge plan.             Signed:    Sara Marmolejo MD   1/23/2020

## 2020-01-23 NOTE — PROGRESS NOTES
[address] [facility]  consulted by Dr. Angelina Foote for monitoring and adjustment. Indication for treatment: CRBSI- MRSA   Goal trough: 15 mcg/mL  Other Antimicrobials:  None     Pertinent Laboratory Values:   Temp Readings from Last 3 Encounters:   01/23/20 98.2 °F (36.8 °C) (Oral)   10/31/19 98.1 °F (36.7 °C) (Oral)   10/16/19 97.8 °F (36.6 °C) (Oral)     Recent Labs     01/21/20  1109 01/22/20  0600 01/23/20  0515   WBC 5.2 5.6 7.3     Recent Labs     01/21/20  1109 01/22/20  0600 01/23/20  0515   BUN 12 12 11   CREATININE 0.8 0.7 0.8     Estimated Creatinine Clearance: 109 mL/min (based on SCr of 0.8 mg/dL). Intake/Output Summary (Last 24 hours) at 1/23/2020 1750  Last data filed at 1/23/2020 9387  Gross per 24 hour   Intake 10 ml   Output --   Net 10 ml       Pertinent Cultures:  Date                             Source                                     Results  1/10                             Blood                                       NGTD  1/13                             Blood   (2 of 2)                         MRSA  1/15                             Medi-Port                                 MRSA  1/15                             Surgical Cx                              MRSA  1/16                             Blood                                       NGTD    Vancomycin level:   TROUGH:    Recent Labs     01/21/20  1109 01/22/20  1816   VANCOTROUGH 20.0 21.2*     RANDOM:  No results for input(s): VANCORANDOM in the last 72 hours. Assessment:  · WBC and temperature: No leukocytosis with 24-hour TMax 98.9F  · SCr, BUN, and urine output: SCR normal, no output data  · Day(s) of therapy: Day #6  · Vancomycin level: Therapeutic ( tough expected to be <20mg/dL)    Plan:  · Dosing comments: Vancomycin concentration returned at 21.2mg/dL; this level was collected <5-hours after dose.  Suspect true trough will be therapeutic <20mg/dL  · Renal function is stable with no clinical signes

## 2020-01-23 NOTE — PROGRESS NOTES
bowel sounds. Ext: right hand swelling, no clubbing, cyanosis, or edema  Catheter Site: Left arm mid-line. Neuro: Mental status intact. CN 2-12 intact and no focal sensory or motor deficits  Musculoskeletal: Lower lumbar spine tenderness. Radiologic / Imaging / TESTING  MRI of thoracolumbar spine     Labs:    Recent Results (from the past 24 hour(s))   Vancomycin, trough    Collection Time: 01/22/20  6:16 PM   Result Value Ref Range    Vancomycin Tr 21.2 (H) 10 - 20 UG/ML    DOSE AMOUNT DOSE AMT.  GIVEN - 750     DOSE TIME DOSE TIME GIVEN - q8h    CBC    Collection Time: 01/23/20  5:15 AM   Result Value Ref Range    WBC 7.3 4.0 - 10.5 K/CU MM    RBC 3.83 (L) 4.2 - 5.4 M/CU MM    Hemoglobin 10.2 (L) 12.5 - 16.0 GM/DL    Hematocrit 33.7 (L) 37 - 47 %    MCV 88.0 78 - 100 FL    MCH 26.6 (L) 27 - 31 PG    MCHC 30.3 (L) 32.0 - 36.0 %    RDW 13.8 11.7 - 14.9 %    Platelets 674 564 - 492 K/CU MM    MPV 9.4 7.5 - 11.1 FL   Basic metabolic panel    Collection Time: 01/23/20  5:15 AM   Result Value Ref Range    Sodium 140 135 - 145 MMOL/L    Potassium 4.2 3.5 - 5.1 MMOL/L    Chloride 104 99 - 110 mMol/L    CO2 25 21 - 32 MMOL/L    Anion Gap 11 4 - 16    BUN 11 6 - 23 MG/DL    CREATININE 0.8 0.6 - 1.1 MG/DL    Glucose 120 (H) 70 - 99 MG/DL    Calcium 9.7 8.3 - 10.6 MG/DL    GFR Non-African American >60 >60 mL/min/1.73m2    GFR African American >60 >60 mL/min/1.73m2     CULTURE results: Invalid input(s): BLOOD CULTURE,  URINE CULTURE, SURGICAL CULTURE    Diagnosis:  Patient Active Problem List   Diagnosis    Rectal bleeding    Fever    Upper GI bleed    Fibromyalgia    Lupus (systemic lupus erythematosus) (HCC)    Nausea & vomiting    Chest pain    Chronic pancreatitis (HCC)    HTN (hypertension)    Acute pancreatitis    Right-sided chest pain    Super obesity    Absolute anemia    Hypokalemia    Generalized abdominal pain    Pneumonia of both lungs due to infectious organism    Foot ulcer, left ([address]    SLE (systemic lupus erythematosus related syndrome) (HCC)    Hypoxia    Cellulitis    Pancytopenia (HCC)    Pleurisy    Frequent UTI    Gastroesophageal reflux disease without esophagitis    MRSA cellulitis of left foot    Depression    Fatty liver    Fatty liver disease, nonalcoholic    Arthritis    Bilateral low back pain with left-sided sciatica    Morbid obesity with BMI of 50.0-59.9, adult (HCC)    Intractable vomiting with nausea    Class 3 obesity in adult    Hiatal hernia    Poor intravenous access    Post-operative nausea and vomiting    Status post bariatric surgery    Status post laparoscopic sleeve gastrectomy    Abscess    Dysphagia    Pseudoseizure    Chronic pain syndrome    Drug-seeking/Aberrant behavior    Acute conjunctivitis    Chronic osteomyelitis of left foot with draining sinus (HCC)    Receiving intravenous antibiotic treatment as outpatient    History of bacteremia    Abdominal pain, right upper quadrant    Sepsis due to Pseudomonas species ([address]    Bacteremia due to Pseudomonas    Catheter-related bloodstream infection    Hematemesis with nausea    Hematemesis    Lymph node disorder    Dizziness    Abdominal pain, epigastric       Active Problems  Active Problems:    Nausea & vomiting    Generalized abdominal pain    Dizziness    Abdominal pain, epigastric  Resolved Problems:    * No resolved hospital problems.  *    Electronically signed by: Electronically signed by Colette Abarca MD on 1/23/2020 at 10:16 AM

## 2020-01-23 NOTE — PROGRESS NOTES
Infectious Disease Progress Note  2020   Patient Name: Claudette Hodge : 1970   Impression  · CRBSI MRSA bacteremia  ? Afebrile, but continues to complain about spinal pain and lower extremity weakness. ? Vancomycin trough remains supratherapeutic (likely erroneous as trough is not being drawn at the current time). ? MediPort removed on 1/15/19, MRSA positive  ? 2 sets of blood culture positive; blood culture  0/ final reports is negative  · Right hand middle finger dactylitis  ? MRI shows tenosynovitis, marrow changes seen: non-infectious osteitis vs early osteomyelitis. · Constipation  · Multi-morbidity: per PMHx obesity, Lupus, status post gastric sleeve surgery, recurrent emesis and hematemesis  Plan:  · Needs a hand surgeon to evaluate for tenosynovitis and early osteomyelitis  · Continue vancomycin  mg every 8 hours (would prefer target trough closer to 15 mcg per ml)  · Plan to treat for 4 weeks-end date: 2020  Weekly labs drawn on Monday during the course of treatment  CBC with differential, CMP, ESR, CRP,   Vancomycin Trough on  and Thursday  Fax results to Attn: [facility] Infectious Diseases Staff  · # [phone]   · see in office in 1 week. Ongoing Antimicrobial Therapy  Vancomycin -? Completed Antimicrobial Therapy  Cefepime -? History:? Interval history noted. MRSA bacteremia  Continues to have right hand pain. But denies fever  Physical Exam:  Vital Signs: BP (!) 155/80   Pulse 77   Temp 98.2 °F (36.8 °C) (Oral)   Resp 19   Ht 5' 4\" (1.626 m)   Wt 276 lb (125.2 kg)   SpO2 99%   BMI 47.38 kg/m²     Gen: alert and oriented X3, no distress  Skin: no stigmata of endocarditis, appears flushed. Wounds: C/D/I  HEMT: AT/NC Oropharynx pink, moist, and without lesions or exudates; dentition in good state of repair  Eyes: PERRLA, EOMI, conjunctiva pink, sclera anicteric. Neck: Supple. Trachea midline. No LAD.   Chest: no distress and CTA. Good air movement. Heart: RRR and no MRG. Abd: soft, non-distended, no tenderness, no hepatomegaly. Normoactive bowel sounds. Ext: right middle finger swelling with restriction in active flexion extension no clubbing, cyanosis, or edema  Catheter Site: Left arm PICC line  Neuro: Mental status intact. CN 2-12 intact and no focal sensory or motor deficits  Musculoskeletal: Lower lumbar spine tenderness. Radiologic / Imaging / TESTING  MRI of thoracolumbar spine     Labs:    Recent Results (from the past 24 hour(s))   Vancomycin, trough    Collection Time: 01/22/20  6:16 PM   Result Value Ref Range    Vancomycin Tr 21.2 (H) 10 - 20 UG/ML    DOSE AMOUNT DOSE AMT.  GIVEN - 750     DOSE TIME DOSE TIME GIVEN - q8h    CBC    Collection Time: 01/23/20  5:15 AM   Result Value Ref Range    WBC 7.3 4.0 - 10.5 K/CU MM    RBC 3.83 (L) 4.2 - 5.4 M/CU MM    Hemoglobin 10.2 (L) 12.5 - 16.0 GM/DL    Hematocrit 33.7 (L) 37 - 47 %    MCV 88.0 78 - 100 FL    MCH 26.6 (L) 27 - 31 PG    MCHC 30.3 (L) 32.0 - 36.0 %    RDW 13.8 11.7 - 14.9 %    Platelets 709 897 - 373 K/CU MM    MPV 9.4 7.5 - 11.1 FL   Basic metabolic panel    Collection Time: 01/23/20  5:15 AM   Result Value Ref Range    Sodium 140 135 - 145 MMOL/L    Potassium 4.2 3.5 - 5.1 MMOL/L    Chloride 104 99 - 110 mMol/L    CO2 25 21 - 32 MMOL/L    Anion Gap 11 4 - 16    BUN 11 6 - 23 MG/DL    CREATININE 0.8 0.6 - 1.1 MG/DL    Glucose 120 (H) 70 - 99 MG/DL    Calcium 9.7 8.3 - 10.6 MG/DL    GFR Non-African American >60 >60 mL/min/1.73m2    GFR African American >60 >60 mL/min/1.73m2     CULTURE results: Invalid input(s): BLOOD CULTURE,  URINE CULTURE, SURGICAL CULTURE    Diagnosis:  Patient Active Problem List   Diagnosis    Rectal bleeding    Fever    Upper GI bleed    Fibromyalgia    Lupus (systemic lupus erythematosus) (HCC)    Nausea & vomiting    Chest pain    Chronic pancreatitis (HCC)    HTN (hypertension)    Acute pancreatitis    Right-sided chest pain    Super obesity    Absolute anemia    Hypokalemia    Generalized abdominal pain    Pneumonia of both lungs due to infectious organism    Foot ulcer, left (HCC)    SLE (systemic lupus erythematosus related syndrome) (HCC)    Hypoxia    Cellulitis    Pancytopenia (HCC)    Pleurisy    Frequent UTI    Gastroesophageal reflux disease without esophagitis    MRSA cellulitis of left foot    Depression    Fatty liver    Fatty liver disease, nonalcoholic    Arthritis    Bilateral low back pain with left-sided sciatica    Morbid obesity with BMI of 50.0-59.9, adult (HCC)    Intractable vomiting with nausea    Class 3 obesity in adult    Hiatal hernia    Poor intravenous access    Post-operative nausea and vomiting    Status post bariatric surgery    Status post laparoscopic sleeve gastrectomy    Abscess    Dysphagia    Pseudoseizure    Chronic pain syndrome    Drug-seeking/Aberrant behavior    Acute conjunctivitis    Chronic osteomyelitis of left foot with draining sinus (HCC)    Receiving intravenous antibiotic treatment as outpatient    History of bacteremia    Abdominal pain, right upper quadrant    Sepsis due to Pseudomonas species ([address]    Bacteremia due to Pseudomonas    Catheter-related bloodstream infection    Hematemesis with nausea    Hematemesis    Lymph node disorder    Dizziness    Abdominal pain, epigastric       Active Problems  Active Problems:    Nausea & vomiting    Generalized abdominal pain    Dizziness    Abdominal pain, epigastric  Resolved Problems:    * No resolved hospital problems.  *    Electronically signed by: Electronically signed by Marlen Rosa MD on 1/23/2020 at 10:19 AM

## 2020-01-23 NOTE — PROGRESS NOTES
[facility] Liaison aware of discharge & will initiate Jace [address]. Pt has competed infusion at home several times and do not want a nurse tonight.  [employer] notified to start in am.

## 2020-01-24 LAB
EKG ATRIAL RATE: 60 BPM
EKG DIAGNOSIS: NORMAL
EKG P AXIS: 40 DEGREES
EKG P-R INTERVAL: 196 MS
EKG Q-T INTERVAL: 408 MS
EKG QRS DURATION: 92 MS
EKG QTC CALCULATION (BAZETT): 408 MS
EKG R AXIS: -15 DEGREES
EKG T AXIS: 23 DEGREES
EKG VENTRICULAR RATE: 60 BPM

## 2020-01-27 ENCOUNTER — HOSPITAL ENCOUNTER (OUTPATIENT)
Age: 50
Setting detail: SPECIMEN
Discharge: HOME | End: 2020-01-27
Payer: COMMERCIAL

## 2020-01-27 LAB
ALBUMIN SERPL-MCNC: 4 GM/DL (ref 3.4–5)
ALP BLD-CCNC: 122 IU/L (ref 40–128)
ALT SERPL-CCNC: 17 U/L (ref 10–40)
ANION GAP SERPL CALCULATED.3IONS-SCNC: 13 MMOL/L (ref 4–16)
AST SERPL-CCNC: 17 IU/L (ref 15–37)
BASOPHILS ABSOLUTE: 0.1 K/CU MM
BASOPHILS RELATIVE PERCENT: 0.7 % (ref 0–1)
BILIRUB SERPL-MCNC: 0.2 MG/DL (ref 0–1)
BUN BLDV-MCNC: 17 MG/DL (ref 6–23)
C-REACTIVE PROTEIN, HIGH SENSITIVITY: 14.4 MG/L
CALCIUM SERPL-MCNC: 10.1 MG/DL (ref 8.3–10.6)
CHLORIDE BLD-SCNC: 102 MMOL/L (ref 99–110)
CO2: 23 MMOL/L (ref 21–32)
CREAT SERPL-MCNC: 0.8 MG/DL (ref 0.6–1.1)
DIFFERENTIAL TYPE: ABNORMAL
DOSE AMOUNT: NORMAL
DOSE TIME: NORMAL
EOSINOPHILS ABSOLUTE: 0.2 K/CU MM
EOSINOPHILS RELATIVE PERCENT: 2.7 % (ref 0–3)
ERYTHROCYTE SEDIMENTATION RATE: 54 MM/HR (ref 0–30)
GFR AFRICAN AMERICAN: >60 ML/MIN/1.73M2
GFR NON-AFRICAN AMERICAN: >60 ML/MIN/1.73M2
GLUCOSE BLD-MCNC: 93 MG/DL (ref 70–99)
HCT VFR BLD CALC: 36.6 % (ref 37–47)
HEMOGLOBIN: 11 GM/DL (ref 12.5–16)
IMMATURE NEUTROPHIL %: 0.3 % (ref 0–0.43)
LYMPHOCYTES ABSOLUTE: 1.4 K/CU MM
LYMPHOCYTES RELATIVE PERCENT: 19 % (ref 24–44)
MCH RBC QN AUTO: 26.5 PG (ref 27–31)
MCHC RBC AUTO-ENTMCNC: 30.1 % (ref 32–36)
MCV RBC AUTO: 88.2 FL (ref 78–100)
MONOCYTES ABSOLUTE: 0.5 K/CU MM
MONOCYTES RELATIVE PERCENT: 6.2 % (ref 0–4)
NUCLEATED RBC %: 0 %
PDW BLD-RTO: 13.5 % (ref 11.7–14.9)
PLATELET # BLD: 384 K/CU MM (ref 140–440)
PMV BLD AUTO: 9.9 FL (ref 7.5–11.1)
POTASSIUM SERPL-SCNC: 4.6 MMOL/L (ref 3.5–5.1)
RBC # BLD: 4.15 M/CU MM (ref 4.2–5.4)
SEGMENTED NEUTROPHILS ABSOLUTE COUNT: 5.3 K/CU MM
SEGMENTED NEUTROPHILS RELATIVE PERCENT: 71.1 % (ref 36–66)
SODIUM BLD-SCNC: 138 MMOL/L (ref 135–145)
TOTAL IMMATURE NEUTOROPHIL: 0.02 K/CU MM
TOTAL NUCLEATED RBC: 0 K/CU MM
TOTAL PROTEIN: 7.3 GM/DL (ref 6.4–8.2)
VANCOMYCIN TROUGH: 19.2 UG/ML (ref 10–20)
WBC # BLD: 7.4 K/CU MM (ref 4–10.5)

## 2020-01-27 PROCEDURE — 80202 ASSAY OF VANCOMYCIN: CPT

## 2020-01-27 PROCEDURE — 85652 RBC SED RATE AUTOMATED: CPT

## 2020-01-27 PROCEDURE — 85025 COMPLETE CBC W/AUTO DIFF WBC: CPT

## 2020-01-27 PROCEDURE — 80053 COMPREHEN METABOLIC PANEL: CPT

## 2020-01-27 PROCEDURE — 86140 C-REACTIVE PROTEIN: CPT

## 2020-01-27 RX ORDER — PROMETHAZINE HYDROCHLORIDE 25 MG/1
25 TABLET ORAL EVERY 6 HOURS PRN
Qty: 30 TABLET | Refills: 2 | Status: SHIPPED | OUTPATIENT
Start: 2020-01-27 | End: 2020-02-26 | Stop reason: SDUPTHER

## 2020-02-01 ENCOUNTER — APPOINTMENT (OUTPATIENT)
Dept: CT IMAGING | Age: 50
End: 2020-02-01
Payer: COMMERCIAL

## 2020-02-01 ENCOUNTER — APPOINTMENT (OUTPATIENT)
Dept: GENERAL RADIOLOGY | Age: 50
End: 2020-02-01
Payer: COMMERCIAL

## 2020-02-01 ENCOUNTER — HOSPITAL ENCOUNTER (OUTPATIENT)
Age: 50
Setting detail: OBSERVATION
Discharge: HOME HEALTH | End: 2020-02-04
Attending: EMERGENCY MEDICINE | Admitting: FAMILY MEDICINE
Payer: COMMERCIAL

## 2020-02-01 PROBLEM — R78.81 BACTEREMIA DUE TO METHICILLIN RESISTANT STAPHYLOCOCCUS AUREUS: Status: ACTIVE | Noted: 2020-02-01

## 2020-02-01 PROBLEM — R11.10 INTRACTABLE VOMITING: Status: ACTIVE | Noted: 2020-02-01

## 2020-02-01 PROBLEM — B95.62 BACTEREMIA DUE TO METHICILLIN RESISTANT STAPHYLOCOCCUS AUREUS: Status: ACTIVE | Noted: 2020-02-01

## 2020-02-01 LAB
ALBUMIN SERPL-MCNC: 3.5 GM/DL (ref 3.4–5)
ALP BLD-CCNC: 106 IU/L (ref 40–129)
ALT SERPL-CCNC: 13 U/L (ref 10–40)
ANION GAP SERPL CALCULATED.3IONS-SCNC: 11 MMOL/L (ref 4–16)
AST SERPL-CCNC: 15 IU/L (ref 15–37)
BACTERIA: NEGATIVE /HPF
BASOPHILS ABSOLUTE: 0 K/CU MM
BASOPHILS RELATIVE PERCENT: 0.5 % (ref 0–1)
BILIRUB SERPL-MCNC: 0.2 MG/DL (ref 0–1)
BILIRUBIN URINE: NEGATIVE MG/DL
BLOOD, URINE: NEGATIVE
BUN BLDV-MCNC: 21 MG/DL (ref 6–23)
CALCIUM SERPL-MCNC: 9.5 MG/DL (ref 8.3–10.6)
CHLORIDE BLD-SCNC: 105 MMOL/L (ref 99–110)
CLARITY: CLEAR
CO2: 23 MMOL/L (ref 21–32)
COLOR: ABNORMAL
CREAT SERPL-MCNC: 0.8 MG/DL (ref 0.6–1.1)
DIFFERENTIAL TYPE: ABNORMAL
DOSE AMOUNT: NORMAL
DOSE TIME: NORMAL
EOSINOPHILS ABSOLUTE: 0.2 K/CU MM
EOSINOPHILS RELATIVE PERCENT: 4.7 % (ref 0–3)
GFR AFRICAN AMERICAN: >60 ML/MIN/1.73M2
GFR NON-AFRICAN AMERICAN: >60 ML/MIN/1.73M2
GLUCOSE BLD-MCNC: 91 MG/DL (ref 70–99)
GLUCOSE, URINE: NEGATIVE MG/DL
HCT VFR BLD CALC: 30.7 % (ref 37–47)
HEMOGLOBIN: 9.4 GM/DL (ref 12.5–16)
HYALINE CASTS: 2 /LPF
IMMATURE NEUTROPHIL %: 0.2 % (ref 0–0.43)
KETONES, URINE: NEGATIVE MG/DL
LACTATE: 0.4 MMOL/L (ref 0.4–2)
LEUKOCYTE ESTERASE, URINE: NEGATIVE
LIPASE: 18 IU/L (ref 13–60)
LYMPHOCYTES ABSOLUTE: 1.4 K/CU MM
LYMPHOCYTES RELATIVE PERCENT: 35.7 % (ref 24–44)
MAGNESIUM: 2.1 MG/DL (ref 1.8–2.4)
MCH RBC QN AUTO: 26.3 PG (ref 27–31)
MCHC RBC AUTO-ENTMCNC: 30.6 % (ref 32–36)
MCV RBC AUTO: 85.8 FL (ref 78–100)
MONOCYTES ABSOLUTE: 0.3 K/CU MM
MONOCYTES RELATIVE PERCENT: 7 % (ref 0–4)
MUCUS: ABNORMAL HPF
NITRITE URINE, QUANTITATIVE: NEGATIVE
NUCLEATED RBC %: 0 %
PDW BLD-RTO: 13.6 % (ref 11.7–14.9)
PH, URINE: 6 (ref 5–8)
PLATELET # BLD: 246 K/CU MM (ref 140–440)
PMV BLD AUTO: 9.4 FL (ref 7.5–11.1)
POTASSIUM SERPL-SCNC: 4.3 MMOL/L (ref 3.5–5.1)
PROTEIN UA: NEGATIVE MG/DL
RBC # BLD: 3.58 M/CU MM (ref 4.2–5.4)
RBC URINE: <1 /HPF (ref 0–6)
SEGMENTED NEUTROPHILS ABSOLUTE COUNT: 2.1 K/CU MM
SEGMENTED NEUTROPHILS RELATIVE PERCENT: 51.9 % (ref 36–66)
SODIUM BLD-SCNC: 139 MMOL/L (ref 135–145)
SPECIFIC GRAVITY UA: 1.01 (ref 1–1.03)
SQUAMOUS EPITHELIAL: 6 /HPF
TOTAL IMMATURE NEUTOROPHIL: 0.01 K/CU MM
TOTAL NUCLEATED RBC: 0 K/CU MM
TOTAL PROTEIN: 6.7 GM/DL (ref 6.4–8.2)
TRICHOMONAS: ABNORMAL /HPF
UROBILINOGEN, URINE: NORMAL MG/DL (ref 0.2–1)
VANCOMYCIN RANDOM: 11.8 UG/ML
VANCOMYCIN RANDOM: NORMAL UG/ML
WBC # BLD: 4 K/CU MM (ref 4–10.5)
WBC UA: 1 /HPF (ref 0–5)

## 2020-02-01 PROCEDURE — 99284 EMERGENCY DEPT VISIT MOD MDM: CPT

## 2020-02-01 PROCEDURE — 87086 URINE CULTURE/COLONY COUNT: CPT

## 2020-02-01 PROCEDURE — 80202 ASSAY OF VANCOMYCIN: CPT

## 2020-02-01 PROCEDURE — 94761 N-INVAS EAR/PLS OXIMETRY MLT: CPT

## 2020-02-01 PROCEDURE — 96361 HYDRATE IV INFUSION ADD-ON: CPT

## 2020-02-01 PROCEDURE — 81001 URINALYSIS AUTO W/SCOPE: CPT

## 2020-02-01 PROCEDURE — 6370000000 HC RX 637 (ALT 250 FOR IP): Performed by: FAMILY MEDICINE

## 2020-02-01 PROCEDURE — 96375 TX/PRO/DX INJ NEW DRUG ADDON: CPT

## 2020-02-01 PROCEDURE — 2580000003 HC RX 258: Performed by: EMERGENCY MEDICINE

## 2020-02-01 PROCEDURE — 83735 ASSAY OF MAGNESIUM: CPT

## 2020-02-01 PROCEDURE — 71045 X-RAY EXAM CHEST 1 VIEW: CPT

## 2020-02-01 PROCEDURE — 76376 3D RENDER W/INTRP POSTPROCES: CPT

## 2020-02-01 PROCEDURE — 6360000002 HC RX W HCPCS: Performed by: FAMILY MEDICINE

## 2020-02-01 PROCEDURE — 74176 CT ABD & PELVIS W/O CONTRAST: CPT

## 2020-02-01 PROCEDURE — 85025 COMPLETE CBC W/AUTO DIFF WBC: CPT

## 2020-02-01 PROCEDURE — 96372 THER/PROPH/DIAG INJ SC/IM: CPT

## 2020-02-01 PROCEDURE — G0378 HOSPITAL OBSERVATION PER HR: HCPCS

## 2020-02-01 PROCEDURE — 87040 BLOOD CULTURE FOR BACTERIA: CPT

## 2020-02-01 PROCEDURE — 96374 THER/PROPH/DIAG INJ IV PUSH: CPT

## 2020-02-01 PROCEDURE — 6360000002 HC RX W HCPCS: Performed by: EMERGENCY MEDICINE

## 2020-02-01 PROCEDURE — 72128 CT CHEST SPINE W/O DYE: CPT

## 2020-02-01 PROCEDURE — 80053 COMPREHEN METABOLIC PANEL: CPT

## 2020-02-01 PROCEDURE — 36593 DECLOT VASCULAR DEVICE: CPT

## 2020-02-01 PROCEDURE — 2500000003 HC RX 250 WO HCPCS: Performed by: FAMILY MEDICINE

## 2020-02-01 PROCEDURE — 96365 THER/PROPH/DIAG IV INF INIT: CPT

## 2020-02-01 PROCEDURE — 83605 ASSAY OF LACTIC ACID: CPT

## 2020-02-01 PROCEDURE — 83690 ASSAY OF LIPASE: CPT

## 2020-02-01 RX ORDER — FEEDER CONTAINER WITH PUMP SET
1 EACH MISCELLANEOUS
Status: DISCONTINUED | OUTPATIENT
Start: 2020-02-01 | End: 2020-02-01 | Stop reason: SDUPTHER

## 2020-02-01 RX ORDER — PROMETHAZINE HYDROCHLORIDE 25 MG/1
12.5 TABLET ORAL EVERY 6 HOURS PRN
Status: DISCONTINUED | OUTPATIENT
Start: 2020-02-01 | End: 2020-02-04 | Stop reason: HOSPADM

## 2020-02-01 RX ORDER — ONDANSETRON 2 MG/ML
4 INJECTION INTRAMUSCULAR; INTRAVENOUS ONCE
Status: COMPLETED | OUTPATIENT
Start: 2020-02-01 | End: 2020-02-01

## 2020-02-01 RX ORDER — PROMETHAZINE HYDROCHLORIDE 25 MG/1
25 TABLET ORAL EVERY 6 HOURS PRN
Status: DISCONTINUED | OUTPATIENT
Start: 2020-02-01 | End: 2020-02-04 | Stop reason: HOSPADM

## 2020-02-01 RX ORDER — M-VIT,TX,IRON,MINS/CALC/FOLIC 27MG-0.4MG
1 TABLET ORAL DAILY
Status: DISCONTINUED | OUTPATIENT
Start: 2020-02-01 | End: 2020-02-04 | Stop reason: HOSPADM

## 2020-02-01 RX ORDER — LACOSAMIDE 50 MG/1
50 TABLET ORAL 2 TIMES DAILY
Status: DISCONTINUED | OUTPATIENT
Start: 2020-02-01 | End: 2020-02-04 | Stop reason: HOSPADM

## 2020-02-01 RX ORDER — DEXTROSE, SODIUM CHLORIDE, AND POTASSIUM CHLORIDE 5; .45; .15 G/100ML; G/100ML; G/100ML
INJECTION INTRAVENOUS CONTINUOUS
Status: DISCONTINUED | OUTPATIENT
Start: 2020-02-01 | End: 2020-02-04 | Stop reason: HOSPADM

## 2020-02-01 RX ORDER — METOCLOPRAMIDE 10 MG/1
10 TABLET ORAL
Status: DISCONTINUED | OUTPATIENT
Start: 2020-02-01 | End: 2020-02-04 | Stop reason: HOSPADM

## 2020-02-01 RX ORDER — VANCOMYCIN HYDROCHLORIDE 1 G/200ML
1000 INJECTION, SOLUTION INTRAVENOUS EVERY 8 HOURS
Status: DISCONTINUED | OUTPATIENT
Start: 2020-02-01 | End: 2020-02-01

## 2020-02-01 RX ORDER — TIZANIDINE 4 MG/1
4 TABLET ORAL 2 TIMES DAILY
Status: DISCONTINUED | OUTPATIENT
Start: 2020-02-01 | End: 2020-02-04 | Stop reason: HOSPADM

## 2020-02-01 RX ORDER — PROCHLORPERAZINE MALEATE 5 MG/1
5 TABLET ORAL EVERY 6 HOURS PRN
Status: DISCONTINUED | OUTPATIENT
Start: 2020-02-01 | End: 2020-02-04 | Stop reason: HOSPADM

## 2020-02-01 RX ORDER — VANCOMYCIN HYDROCHLORIDE 1 G/200ML
1000 INJECTION, SOLUTION INTRAVENOUS EVERY 8 HOURS
Status: DISCONTINUED | OUTPATIENT
Start: 2020-02-01 | End: 2020-02-02

## 2020-02-01 RX ORDER — LEVETIRACETAM 500 MG/1
1000 TABLET ORAL 2 TIMES DAILY
Status: DISCONTINUED | OUTPATIENT
Start: 2020-02-01 | End: 2020-02-04 | Stop reason: HOSPADM

## 2020-02-01 RX ORDER — OXYCODONE HYDROCHLORIDE AND ACETAMINOPHEN 5; 325 MG/1; MG/1
1 TABLET ORAL EVERY 8 HOURS PRN
Status: DISCONTINUED | OUTPATIENT
Start: 2020-02-01 | End: 2020-02-01

## 2020-02-01 RX ORDER — LEVOTHYROXINE SODIUM 0.12 MG/1
125 TABLET ORAL DAILY
Status: DISCONTINUED | OUTPATIENT
Start: 2020-02-02 | End: 2020-02-04 | Stop reason: HOSPADM

## 2020-02-01 RX ORDER — SODIUM CHLORIDE 0.9 % (FLUSH) 0.9 %
10 SYRINGE (ML) INJECTION PRN
Status: DISCONTINUED | OUTPATIENT
Start: 2020-02-01 | End: 2020-02-04 | Stop reason: HOSPADM

## 2020-02-01 RX ORDER — ATENOLOL 25 MG/1
25 TABLET ORAL DAILY
Status: DISCONTINUED | OUTPATIENT
Start: 2020-02-01 | End: 2020-02-04 | Stop reason: HOSPADM

## 2020-02-01 RX ORDER — ONDANSETRON 4 MG/1
4 TABLET, ORALLY DISINTEGRATING ORAL EVERY 8 HOURS PRN
Status: DISCONTINUED | OUTPATIENT
Start: 2020-02-01 | End: 2020-02-04 | Stop reason: HOSPADM

## 2020-02-01 RX ORDER — DICYCLOMINE HCL 20 MG
20 TABLET ORAL EVERY 6 HOURS
Status: DISCONTINUED | OUTPATIENT
Start: 2020-02-01 | End: 2020-02-04 | Stop reason: HOSPADM

## 2020-02-01 RX ORDER — PAROXETINE HYDROCHLORIDE 20 MG/1
40 TABLET, FILM COATED ORAL NIGHTLY
Status: DISCONTINUED | OUTPATIENT
Start: 2020-02-01 | End: 2020-02-04 | Stop reason: HOSPADM

## 2020-02-01 RX ORDER — MORPHINE SULFATE 4 MG/ML
4 INJECTION, SOLUTION INTRAMUSCULAR; INTRAVENOUS ONCE
Status: COMPLETED | OUTPATIENT
Start: 2020-02-01 | End: 2020-02-01

## 2020-02-01 RX ORDER — SODIUM CHLORIDE 0.9 % (FLUSH) 0.9 %
10 SYRINGE (ML) INJECTION EVERY 12 HOURS SCHEDULED
Status: DISCONTINUED | OUTPATIENT
Start: 2020-02-01 | End: 2020-02-04 | Stop reason: HOSPADM

## 2020-02-01 RX ORDER — ESTRADIOL 1 MG/1
0.5 TABLET ORAL DAILY
Status: DISCONTINUED | OUTPATIENT
Start: 2020-02-02 | End: 2020-02-04 | Stop reason: HOSPADM

## 2020-02-01 RX ORDER — OXYCODONE HYDROCHLORIDE AND ACETAMINOPHEN 5; 325 MG/1; MG/1
1 TABLET ORAL EVERY 6 HOURS PRN
Status: DISCONTINUED | OUTPATIENT
Start: 2020-02-01 | End: 2020-02-04 | Stop reason: HOSPADM

## 2020-02-01 RX ORDER — PANTOPRAZOLE SODIUM 40 MG/1
40 TABLET, DELAYED RELEASE ORAL
Status: DISCONTINUED | OUTPATIENT
Start: 2020-02-02 | End: 2020-02-04 | Stop reason: HOSPADM

## 2020-02-01 RX ORDER — 0.9 % SODIUM CHLORIDE 0.9 %
1000 INTRAVENOUS SOLUTION INTRAVENOUS ONCE
Status: COMPLETED | OUTPATIENT
Start: 2020-02-01 | End: 2020-02-01

## 2020-02-01 RX ORDER — ONDANSETRON 2 MG/ML
4 INJECTION INTRAMUSCULAR; INTRAVENOUS EVERY 6 HOURS PRN
Status: DISCONTINUED | OUTPATIENT
Start: 2020-02-01 | End: 2020-02-04 | Stop reason: HOSPADM

## 2020-02-01 RX ORDER — HYDROMORPHONE HCL 110MG/55ML
1 PATIENT CONTROLLED ANALGESIA SYRINGE INTRAVENOUS EVERY 30 MIN PRN
Status: DISCONTINUED | OUTPATIENT
Start: 2020-02-01 | End: 2020-02-01

## 2020-02-01 RX ORDER — GABAPENTIN 400 MG/1
800 CAPSULE ORAL 3 TIMES DAILY
Status: DISCONTINUED | OUTPATIENT
Start: 2020-02-01 | End: 2020-02-04 | Stop reason: HOSPADM

## 2020-02-01 RX ORDER — FERROUS SULFATE 325(65) MG
325 TABLET ORAL 2 TIMES DAILY
Status: DISCONTINUED | OUTPATIENT
Start: 2020-02-01 | End: 2020-02-04 | Stop reason: HOSPADM

## 2020-02-01 RX ADMIN — OXYCODONE HYDROCHLORIDE AND ACETAMINOPHEN 1 TABLET: 5; 325 TABLET ORAL at 18:18

## 2020-02-01 RX ADMIN — LEVETIRACETAM 1000 MG: 500 TABLET, FILM COATED ORAL at 20:41

## 2020-02-01 RX ADMIN — POTASSIUM CHLORIDE, DEXTROSE MONOHYDRATE AND SODIUM CHLORIDE: 150; 5; 450 INJECTION, SOLUTION INTRAVENOUS at 20:41

## 2020-02-01 RX ADMIN — MORPHINE SULFATE 4 MG: 4 INJECTION, SOLUTION INTRAMUSCULAR; INTRAVENOUS at 13:18

## 2020-02-01 RX ADMIN — LACOSAMIDE 50 MG: 50 TABLET, FILM COATED ORAL at 20:41

## 2020-02-01 RX ADMIN — PROMETHAZINE HYDROCHLORIDE 25 MG: 25 TABLET ORAL at 20:51

## 2020-02-01 RX ADMIN — SODIUM CHLORIDE 1000 ML: 9 INJECTION, SOLUTION INTRAVENOUS at 17:00

## 2020-02-01 RX ADMIN — VANCOMYCIN HYDROCHLORIDE 1000 MG: 1 INJECTION, SOLUTION INTRAVENOUS at 22:44

## 2020-02-01 RX ADMIN — DICYCLOMINE HYDROCHLORIDE 20 MG: 20 TABLET ORAL at 22:50

## 2020-02-01 RX ADMIN — ENOXAPARIN SODIUM 40 MG: 40 INJECTION SUBCUTANEOUS at 20:42

## 2020-02-01 RX ADMIN — ONDANSETRON 4 MG: 2 INJECTION INTRAMUSCULAR; INTRAVENOUS at 13:18

## 2020-02-01 RX ADMIN — OYSTER SHELL CALCIUM WITH VITAMIN D 1 TABLET: 500; 200 TABLET, FILM COATED ORAL at 22:24

## 2020-02-01 RX ADMIN — HYDROMORPHONE HYDROCHLORIDE 1 MG: 2 INJECTION INTRAMUSCULAR; INTRAVENOUS; SUBCUTANEOUS at 14:53

## 2020-02-01 RX ADMIN — ALTEPLASE 2 MG: 2.2 INJECTION, POWDER, LYOPHILIZED, FOR SOLUTION INTRAVENOUS at 15:04

## 2020-02-01 RX ADMIN — GABAPENTIN 800 MG: 400 CAPSULE ORAL at 20:41

## 2020-02-01 RX ADMIN — TIZANIDINE 4 MG: 4 TABLET ORAL at 20:41

## 2020-02-01 RX ADMIN — FERROUS SULFATE TAB 325 MG (65 MG ELEMENTAL FE) 325 MG: 325 (65 FE) TAB at 20:41

## 2020-02-01 RX ADMIN — SODIUM CHLORIDE 1000 ML: 9 INJECTION, SOLUTION INTRAVENOUS at 13:19

## 2020-02-01 RX ADMIN — PAROXETINE HYDROCHLORIDE 40 MG: 20 TABLET, FILM COATED ORAL at 20:41

## 2020-02-01 ASSESSMENT — PAIN SCALES - GENERAL
PAINLEVEL_OUTOF10: 7
PAINLEVEL_OUTOF10: 7
PAINLEVEL_OUTOF10: 8
PAINLEVEL_OUTOF10: 7

## 2020-02-01 ASSESSMENT — ENCOUNTER SYMPTOMS
RHINORRHEA: 0
ABDOMINAL PAIN: 1
COUGH: 0
VOMITING: 1
SORE THROAT: 0
CONSTIPATION: 0
SHORTNESS OF BREATH: 0
SINUS PRESSURE: 0
DIARRHEA: 1
BACK PAIN: 1
NAUSEA: 1
WHEEZING: 0

## 2020-02-01 ASSESSMENT — PAIN - FUNCTIONAL ASSESSMENT
PAIN_FUNCTIONAL_ASSESSMENT: PREVENTS OR INTERFERES SOME ACTIVE ACTIVITIES AND ADLS
PAIN_FUNCTIONAL_ASSESSMENT: PREVENTS OR INTERFERES SOME ACTIVE ACTIVITIES AND ADLS

## 2020-02-01 ASSESSMENT — PAIN DESCRIPTION - FREQUENCY
FREQUENCY: CONTINUOUS

## 2020-02-01 ASSESSMENT — PAIN DESCRIPTION - PAIN TYPE
TYPE: ACUTE PAIN

## 2020-02-01 ASSESSMENT — PAIN DESCRIPTION - DIRECTION
RADIATING_TOWARDS: BACK OF LEGS

## 2020-02-01 ASSESSMENT — PAIN DESCRIPTION - ORIENTATION
ORIENTATION: RIGHT;LEFT

## 2020-02-01 ASSESSMENT — PAIN DESCRIPTION - PROGRESSION
CLINICAL_PROGRESSION: GRADUALLY WORSENING
CLINICAL_PROGRESSION: GRADUALLY WORSENING

## 2020-02-01 ASSESSMENT — PAIN DESCRIPTION - LOCATION
LOCATION: BACK;LEG
LOCATION: LEG;BACK

## 2020-02-01 ASSESSMENT — PAIN DESCRIPTION - ONSET
ONSET: ON-GOING
ONSET: ON-GOING

## 2020-02-01 ASSESSMENT — PAIN DESCRIPTION - DESCRIPTORS
DESCRIPTORS: ACHING
DESCRIPTORS: ACHING;DISCOMFORT
DESCRIPTORS: ACHING
DESCRIPTORS: ACHING

## 2020-02-01 NOTE — ED PROVIDER NOTES
02/01/20p.seferino Perkins was checked out to me by Dr. Kimmie Underwood. Please see his/her initial documentation for details of the patient's ED presentation, physical exam and completed studies.     In brief, Audie Perkins is a 44 y.o. female that presents with n/v/d awaiting labs, will need admission    I have reviewed and interpreted all of the currently available lab results from this visit (if applicable):  Results for orders placed or performed during the hospital encounter of 02/01/20   Lactic Acid, Plasma   Result Value Ref Range    Lactate 0.4 0.4 - 2.0 mMOL/L   CBC Auto Differential   Result Value Ref Range    WBC 4.0 4.0 - 10.5 K/CU MM    RBC 3.58 (L) 4.2 - 5.4 M/CU MM    Hemoglobin 9.4 (L) 12.5 - 16.0 GM/DL    Hematocrit 30.7 (L) 37 - 47 %    MCV 85.8 78 - 100 FL    MCH 26.3 (L) 27 - 31 PG    MCHC 30.6 (L) 32.0 - 36.0 %    RDW 13.6 11.7 - 14.9 %    Platelets 665 202 - 910 K/CU MM    MPV 9.4 7.5 - 11.1 FL    Differential Type AUTOMATED DIFFERENTIAL     Segs Relative 51.9 36 - 66 %    Lymphocytes % 35.7 24 - 44 %    Monocytes % 7.0 (H) 0 - 4 %    Eosinophils % 4.7 (H) 0 - 3 %    Basophils % 0.5 0 - 1 %    Segs Absolute 2.1 K/CU MM    Lymphocytes Absolute 1.4 K/CU MM    Monocytes Absolute 0.3 K/CU MM    Eosinophils Absolute 0.2 K/CU MM    Basophils Absolute 0.0 K/CU MM    Nucleated RBC % 0.0 %    Total Nucleated RBC 0.0 K/CU MM    Total Immature Neutrophil 0.01 K/CU MM    Immature Neutrophil % 0.2 0 - 0.43 %   Comprehensive Metabolic Panel   Result Value Ref Range    Sodium 139 135 - 145 MMOL/L    Potassium 4.3 3.5 - 5.1 MMOL/L    Chloride 105 99 - 110 mMol/L    CO2 23 21 - 32 MMOL/L    BUN 21 6 - 23 MG/DL    CREATININE 0.8 0.6 - 1.1 MG/DL    Glucose 91 70 - 99 MG/DL    Calcium 9.5 8.3 - 10.6 MG/DL    Alb 3.5 3.4 - 5.0 GM/DL    Total Protein 6.7 6.4 - 8.2 GM/DL    Total Bilirubin 0.2 0.0 - 1.0 MG/DL    ALT 13 10 - 40 U/L    AST 15 15 - 37 IU/L    Alkaline Phosphatase 106 40 - 129 IU/L    GFR Non-

## 2020-02-01 NOTE — H&P
HISTORY AND PHYSICAL    2020     Patient Information:    Patient: Margarito Santos     Gender: female  : 1970   Age: 44 y.o. MRN: [id number]        PCP:  Macho Murcia MD (Tel: [phone] )    Chief complaint:    Chief Complaint   Patient presents with    Emesis     onset 4 days/ receiving IV treatment for Blood infection of MRSA / discharged from hospital 9 days ago/ home care unable to get labs from [facility] so unable to receive antibiotics on Vanco        History of Present Illness:  Margarito Santos is a 44 y.o. female with morbid obesity , Anxiety , h/o Upper GI bleed few years ago , s/p gastric sleeve on 2019 with mild gastritis on EGD  2019 , history of pancreatitis , h/o left foot infection with Chronic osteomylitis of left first metatarsal [place], pseudoseizure disorder. Pt was admitted recently two weeks ago and was discharged on 2020 on Vancomycin 1 g TID due to MRSA  bacteremia presented to ER complaining 4 days of nausea and vomiting   Which got worse and since yesterday she did not keep any water or fluid down with significant poor oral intake . History obtained from patient . the patient seen in ER        REVIEW OF SYSTEMS:   Constitutional: Negative for fever,chills or night sweats  ENT: Negative for rhinorrhea, epistaxis, hoarseness, sore throat. Respiratory: Negative for shortness of breath,wheezing  Cardiovascular: Negative for chest pain, palpitations   Gastrointestinal: + for nausea, vomiting, diarrhea  Genitourinary: Negative for polyuria, dysuria   Hematologic/Lymphatic: Negative for bleeding tendency, easy bruising  Musculoskeletal: Negative for myalgias and arthralgias  Neurologic: Negative for confusion,dysarthria. Skin: Negative for itching,rash  Psychiatric: Negative for depression,anxiety, agitation. Endocrine: Negative for polydipsia,polyuria,heat /cold intolerance.     Past Medical History:   has a past medical history of Acid reflux, Anemia, Anxiety, Arthritis, Bleeding ulcer, Bronchitis, Chronic back pain, Chronic pain, COPD (chronic obstructive pulmonary disease) ([address] Depression, Fibromyalgia, H/O echocardiogram, H/O echocardiogram, Hiatal hernia, History of blood transfusion, Platinum (hard of hearing), Hx of cardiac catheterization, Hx of transesophageal echocardiography (PILO) for monitoring, HX OTHER MEDICAL, HX OTHER MEDICAL, Hypertension, Lupus ([address] Morbid obesity ([address] MRSA bacteremia, Nausea, Pain management, Pancreatitis chronic, Pneumonia, Shortness of breath on exertion, Shortness of breath on exertion, Sleep apnea, Staph infection, Staph infection, Teeth missing, Thyroid disease, UTI (urinary tract infection), and Wears glasses. Past Surgical History:   has a past surgical history that includes Lung removal, partial (Left, );  section (1991); Foot surgery (Left, Last Done In ); Dental surgery; Cholecystectomy, laparoscopic (4167'F); other surgical history (1998); other surgical history (Last Done 7-15-16); Tonsillectomy and adenoidectomy (); Appendectomy (1998); Upper gastrointestinal endoscopy (2018); Lap Band (~); Hysterectomy (10/1997); Endoscopy, colon, diagnostic (Last Done In ); Colonoscopy (Last Done In ); Cardiac catheterization (10/24/2010); Sleeve Gastrectomy (N/A, 2019); hiatal hernia repair (N/A, 2019); Upper gastrointestinal endoscopy (N/A, 2019); Foot Debridement (Left, 2019); Upper gastrointestinal endoscopy (N/A, 2019); Catheter Removal (N/A, 2019); Upper gastrointestinal endoscopy (N/A, 2019); INSERTION / REMOVAL / REPLACEMENT VENOUS ACCESS CATHETER (N/A, 8/15/2019); Upper gastrointestinal endoscopy (N/A, 10/14/2019); and Port Surgery (N/A, 1/15/2020). Medications:  No current facility-administered medications on file prior to encounter.       Current Outpatient mouth nightly          Allergies: Allergies   Allergen Reactions    Aspirin Palpitations     \"My Heart Rate Elevates\"    Shellfish-Derived Products Swelling    Toradol [Ketorolac Tromethamine] Rash        Social History:   reports that she has never smoked. She has never used smokeless tobacco. She reports that she does not drink alcohol or use drugs. Family History:  family history includes Arthritis in her mother; Asthma in her father and son; Cancer in her father; Diabetes in her father and mother; Heart Disease in her mother; High Blood Pressure in her brother, father, and mother; High Cholesterol in her mother; Lupus in her daughter; Obesity in her mother; Other in her daughter; Vision Loss in her mother and son. ,     Physical Exam:  BP (!) 162/90   Pulse 67   Temp 98.5 °F (36.9 °C) (Oral)   Resp 18   Ht 5' 4\" (1.626 m)   Wt 260 lb (117.9 kg)   SpO2 98%   BMI 44.63 kg/m²     General appearance:  Appears comfortable. Well nourished  Eyes: Sclera clear, pupils equal  Cardiovascular: Regular rhythm, normal S1, S2. No murmur. No edema in lower extremities  Respiratory: Clear to auscultation bilaterally, no wheeze, good inspiratory effort  Gastrointestinal: Abdomen soft, non-tender, not distended, normal bowel sounds  Musculoskeletal: No cyanosis in digits, neck supple  Neurology: Cranial nerves grossly intact. Alert and oriented in time, place and person. No speech or motor deficits  Psychiatry: Appropriate affect.  Not agitated  Skin: Warm, dry, normal turgor, no rash    Labs:  CBC:   Lab Results   Component Value Date    WBC 4.0 02/01/2020    RBC 3.58 02/01/2020    HGB 9.4 02/01/2020    HCT 30.7 02/01/2020    MCV 85.8 02/01/2020    MCH 26.3 02/01/2020    MCHC 30.6 02/01/2020    RDW 13.6 02/01/2020     02/01/2020    MPV 9.4 02/01/2020     BMP:    Lab Results   Component Value Date     02/01/2020    K 4.3 02/01/2020     02/01/2020    CO2 23 02/01/2020    BUN 21 02/01/2020 right upper quadrant R10.11    Sepsis due to Pseudomonas species ([facility] [address] A41.52    Bacteremia due to Pseudomonas R78.81    Catheter-related bloodstream infection T80.211A    Hematemesis with nausea K92.0    Hematemesis K92.0    Lymph node disorder I89.9    Dizziness R42    Abdominal pain, epigastric R10.13    Intractable vomiting R11.10    Bacteremia due to methicillin resistant Staphylococcus aureus R78.81         Active Hospital Problems    Diagnosis    Nausea & vomiting [R11.2]     Priority: High    Bacteremia due to methicillin resistant Staphylococcus aureus [R78.81]    Abdominal pain, epigastric [R10.13]             Assessment/Plan:   Tele   D5 1/2 nacl with KCL  Full liquid diet   Symptomatic treatment , Zofran, Phenergan, compzinem, reglan   Will avoid  dilaudid, Xana x or Ativan   Cont Elsao , pharmacy consult   Blood c/s x 2   DVT proph     Antonieta Evangelista MD    2/1/2020 6:52 PM  \

## 2020-02-01 NOTE — ED NOTES
PICC team consulted due to line not drawing blood. Will come assess patient.       Sher Balderrama RN  02/01/20 1400

## 2020-02-01 NOTE — ED NOTES
Dr. Vlad Graec aware unable to infuse NS bolus due to PICC receiving CathFlo.       Sailaja Jeff RN  02/01/20 7131

## 2020-02-01 NOTE — ED PROVIDER NOTES
Neurological: Negative for dizziness and syncope. Psychiatric/Behavioral: Negative for agitation, behavioral problems and confusion. Past Medical History:   Diagnosis Date    Acid reflux     Anemia     Anxiety     Arthritis     Hands, Back And Ankles    Bleeding ulcer 2014    \"I Had Ulcers In My Stomach And Colon\"/ per pt on 8/12/2019\"they said recently having some blood in my stomach- in July ( 2019)could not find where coming from \"    Bronchitis Last Episode 2014    Chronic back pain     Chronic pain     Sees Dr. Jessy Roman COPD (chronic obstructive pulmonary disease) ([facility] [address]     Sees Dr. Rodas Session Depression     Fibromyalgia Dx 2013    H/O echocardiogram 8/11/15    EF >55%. LA to be at the upper limit of normal in size. LV hypertrophy with normal LV systolic, but abnormal diastolic function. Normal valvular structures and function.  H/O echocardiogram 08/30/2018    EF 55-60%    Hiatal hernia     History of blood transfusion 09/2015 And 2018    No Reaction To Blood Transfusions Received    [place] (hard of hearing)     Right Ear    Hx of cardiac catheterization 10/24/2010    Angiographically normal coronary arteries w/ normal LV function and wall motion.  Hx of transesophageal echocardiography (PILO) for monitoring 12/2/2010    EF 55-60%. WNL.    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 name    Lupus [facility]) Dx 2013    \"Rheumatoid Lupus\"    Morbid obesity ([facility] [address]     MRSA bacteremia     Nausea     \"Most Of The Time\"    Pain management     Sees Dr. Raegan Frye, Once A Month    Pancreatitis chronic Dx 2001    Pneumonia Dx 11-15    Shortness of breath on exertion     Shortness Not on file    Stress: Not on file   Relationships    Social connections:     Talks on phone: Not on file     Gets together: Not on file     Attends Mu-ism service: Not on file     Active member of club or organization: Not on file     Attends meetings of clubs or organizations: Not on file     Relationship status: Not on file    Intimate partner violence:     Fear of current or ex partner: Not on file     Emotionally abused: Not on file     Physically abused: Not on file     Forced sexual activity: Not on file   Other Topics Concern    Not on file   Social History Narrative    Not on file     Current Facility-Administered Medications   Medication Dose Route Frequency Provider Last Rate Last Dose    alteplase (CATHFLO) injection 1 mg  1 mg Intracatheter Once Lily Rojas, DO         Current Outpatient Medications   Medication Sig Dispense Refill    promethazine (PHENERGAN) 25 MG tablet Take 1 tablet by mouth every 6 hours as needed for Nausea 30 tablet 2    vancomycin (VANCOCIN) infusion Infuse 1,000 mg intravenously every 8 hours Compound per protocol. 85643 mg 0    pantoprazole (PROTONIX) 40 MG tablet Take 1 tablet by mouth every morning (before breakfast) 90 tablet 1    levETIRAcetam (KEPPRA) 1000 MG tablet Take 1 tablet by mouth 2 times daily 60 tablet 3    lacosamide (VIMPAT) 50 MG TABS tablet Take 1 tablet by mouth 2 times daily for 14 days. 28 tablet 0    oxyCODONE-acetaminophen (PERCOCET) 5-325 MG per tablet Take 1 tablet by mouth every 8 hours as needed for Pain.        Nutritional Supplements (ENSURE HIGH PROTEIN) LIQD Take 1 Can by mouth Daily with lunch 32 Can 3    ondansetron (ZOFRAN ODT) 4 MG disintegrating tablet Take 1 tablet by mouth every 8 hours as needed for Nausea or Vomiting 30 tablet 2    Cholecalciferol (VITAMIN D3) 5000 units TABS Take 1 tablet by mouth daily      ferrous sulfate (FE TABS) 325 (65 Fe) MG EC tablet Take 1 tablet by mouth 2 times daily 60 tablet 5    tiZANidine (ZANAFLEX) 4 MG tablet Take 4 mg by mouth 2 times daily      estradiol (ESTRACE) 0.5 MG tablet Take 0.5 mg by mouth daily      dicyclomine (BENTYL) 20 MG tablet Take 20 mg by mouth every 6 hours      atenolol (TENORMIN) 25 MG tablet Take 25 mg by mouth daily      Multiple Vitamin (MVI, BARIATRIC ADVANTAGE MULTI-FORMULA, CHEW TAB) Take 1 tablet by mouth daily 30 tablet 5    Calcium Citrate-Vitamin D (CALCIUM + VIT D, BARIATRIC ADVANTAGE, CHEWABLE TABLET) Take 1 tablet by mouth 2 times daily 120 tablet 5    levothyroxine (SYNTHROID) 125 MCG tablet Take 1 tablet by mouth Daily (Patient taking differently: Take 125 mcg by mouth nightly ) 30 tablet 0    gabapentin (NEURONTIN) 800 MG tablet Take 800 mg by mouth 3 times daily      PARoxetine (PAXIL) 30 MG tablet Take 40 mg by mouth nightly        Allergies   Allergen Reactions    Aspirin Palpitations     \"My Heart Rate Elevates\"    Compazine [Prochlorperazine Maleate] Rash    Reglan [Metoclopramide Hcl] Rash    Shellfish-Derived Products Swelling    Toradol [Ketorolac Tromethamine] Rash       Nursing Notes Reviewed    Physical Exam:  Triage VS:    ED Triage Vitals [02/01/20 1158]   Enc Vitals Group      /65      Pulse 69      Resp (!) 116      Temp 98.9 °F (37.2 °C)      Temp Source Oral      SpO2 99 %      Weight 260 lb (117.9 kg)      Height 5' 4\" (1.626 m)      Head Circumference       Peak Flow       Pain Score       Pain Loc       Pain Edu? Excl. in [address]? Physical Exam  Vitals signs and nursing note reviewed. Constitutional:       Appearance: Normal appearance. HENT:      Head: Normocephalic and atraumatic. Mouth/Throat:      Mouth: Mucous membranes are dry. Eyes:      Conjunctiva/sclera: Conjunctivae normal.   Cardiovascular:      Rate and Rhythm: Regular rhythm. Tachycardia present. Pulmonary:      Effort: Pulmonary effort is normal. No respiratory distress. Breath sounds: Normal breath sounds.  No wheezing or rhonchi. Abdominal:      General: Abdomen is flat. Bowel sounds are normal.      Palpations: Abdomen is soft. Tenderness: There is abdominal tenderness in the right lower quadrant. There is no guarding or rebound. Negative signs include Champion's sign and McBurney's sign. Skin:     General: Skin is warm. Capillary Refill: Capillary refill takes less than 2 seconds. Neurological:      Mental Status: She is alert and oriented to person, place, and time. Mental status is at baseline. Psychiatric:         Mood and Affect: Mood normal.         Thought Content: Thought content normal.         Judgment: Judgment normal.              I have reviewed and interpreted all of the currently available lab results from this visit (if applicable):  Results for orders placed or performed during the hospital encounter of 02/01/20   Urinalysis with Microscopic   Result Value Ref Range    Color, UA STRAW (A) YELLOW    Clarity, UA CLEAR CLEAR    Glucose, Urine NEGATIVE NEGATIVE MG/DL    Bilirubin Urine NEGATIVE NEGATIVE MG/DL    Ketones, Urine NEGATIVE NEGATIVE MG/DL    Specific Gravity, UA 1.010 1.001 - 1.035    Blood, Urine NEGATIVE NEGATIVE    pH, Urine 6.0 5.0 - 8.0    Protein, UA NEGATIVE NEGATIVE MG/DL    Urobilinogen, Urine NORMAL 0.2 - 1.0 MG/DL    Nitrite Urine, Quantitative NEGATIVE NEGATIVE    Leukocyte Esterase, Urine NEGATIVE NEGATIVE    RBC, UA <1 0 - 6 /HPF    WBC, UA 1 0 - 5 /HPF    Bacteria, UA NEGATIVE NEGATIVE /HPF    Squam Epithel, UA 6 /HPF    Mucus, UA RARE (A) NEGATIVE HPF    Trichomonas, UA NONE SEEN NONE SEEN /HPF    Hyaline Casts, UA 2 /LPF      Radiographs (if obtained):    [] Radiologist's Report Reviewed:  XR CHEST PORTABLE   Final Result   1. No acute cardiopulmonary process identified. 2. Left-sided PICC line identified with tip projecting over the expected   location of the left brachiocephalic vein. CT ABDOMEN PELVIS WO CONTRAST Additional Contrast? None   Final Result   1.  No acute intra-abdominal or intrapelvic process. 2. Status post cholecystectomy, gastric bypass and hysterectomy. There may   also have been previous appendectomy. 3. No significant interval change is appreciated. CT LUMBAR RECONSTRUCTION WO POST PROCESS   Final Result   1. No acute osseous abnormality of the thoracic spine. 2. No acute osseous abnormality of the lumbar spine. 3. Mild multilevel degenerative disc disease in the midthoracic spine. 4. Mild degenerative disc disease at L5-S1 with mild facet arthropathy at   L4-5 and L5-S1. If there are neurologic symptoms, MRI could be performed for   further evaluation. CT THORACIC SPINE WO CONTRAST   Final Result   1. No acute osseous abnormality of the thoracic spine. 2. No acute osseous abnormality of the lumbar spine. 3. Mild multilevel degenerative disc disease in the midthoracic spine. 4. Mild degenerative disc disease at L5-S1 with mild facet arthropathy at   L4-5 and L5-S1. If there are neurologic symptoms, MRI could be performed for   further evaluation. Chart review shows recent radiographs:  Ct Abdomen Pelvis Wo Contrast Additional Contrast? None    Result Date: 2/1/2020  EXAMINATION: CT OF THE ABDOMEN AND PELVIS WITHOUT CONTRAST 2/1/2020 12:35 pm TECHNIQUE: CT of the abdomen and pelvis was performed without the administration of intravenous contrast. Multiplanar reformatted images are provided for review. Dose modulation, iterative reconstruction, and/or weight based adjustment of the mA/kV was utilized to reduce the radiation dose to as low as reasonably achievable. COMPARISON: Abdominal/pelvic CT, 01/09/2020.  HISTORY: ORDERING SYSTEM PROVIDED HISTORY: abdominal pain TECHNOLOGIST PROVIDED HISTORY: Reason for exam:->abdominal pain Additional Contrast?->None Is the patient pregnant?->No Reason for Exam: abdominal pain Acuity: Acute Type of Exam: Initial Additional signs and symptoms: emesis; MRSA FINDINGS: Lower L5-S1 disc height loss and mild multilevel anterior endplate spurring. Mild L4-5 and L5-S1 facet hypertrophy is present. There is no acute soft tissue abnormality. Cholecystectomy clips are present. 1. No acute osseous abnormality of the lumbar spine. 2. Mild degenerative changes greatest at L5-S1. Xr Hand Right (min 3 Views)    Result Date: 1/16/2020  EXAMINATION: THREE XRAY VIEWS OF THE RIGHT HAND 1/16/2020 6:15 pm COMPARISON: CT hand 01/14/2020 HISTORY: ORDERING SYSTEM PROVIDED HISTORY: painful swelling TECHNOLOGIST PROVIDED HISTORY: Reason for exam:->painful swelling Reason for Exam: painful swelling Acuity: Acute Type of Exam: Initial Additional signs and symptoms: none Relevant Medical/Surgical History: none FINDINGS: Flexion deformities of the distal interphalangeal joint of the 3rd digit and proximal interphalangeal joint of the 5th digit suggest previous injuries. There is no acute fracture identified. There is multifocal joint space narrowing involving the proximal and distal interphalangeal joints, most notably at the 2nd and 5th digit. No erosions are identified. Diffuse soft tissue swelling of the dorsum of the hand is noted as well. There is no acute fracture identified. 1. Diffuse soft tissue swelling of the dorsum of the hand of uncertain etiology. 2. No acute osseous abnormality evident. 3. Suspected remote posttraumatic deformity of the distal interphalangeal joint of the 3rd digit and proximal interphalangeal joint of the 5th digit. 4. Multifocal osteoarthritis. Ct Head Wo Contrast    Result Date: 1/9/2020  EXAMINATION: CT OF THE HEAD WITHOUT CONTRAST  1/9/2020 6:06 pm TECHNIQUE: CT of the head was performed without the administration of intravenous contrast. Dose modulation, iterative reconstruction, and/or weight based adjustment of the mA/kV was utilized to reduce the radiation dose to as low as reasonably achievable.  COMPARISON: 10/01/2019 HISTORY: [address] PROVIDED HISTORY: states headache and vision changes two days ago TECHNOLOGIST PROVIDED HISTORY: Reason for exam:->states headache and vision changes two days ago Has a \"code stroke\" or \"stroke alert\" been called? ->No Is the patient pregnant?->No Reason for Exam: states headache and vision changes two days ago Acuity: Acute Type of Exam: Initial Additional signs and symptoms: no Relevant Medical/Surgical History: none FINDINGS: BRAIN/VENTRICLES: There is no acute intracranial hemorrhage, mass effect or midline shift. No abnormal extra-axial fluid collection. The gray-white differentiation is maintained without evidence of an acute infarct. There is no evidence of hydrocephalus. ORBITS: The visualized portion of the orbits demonstrate no acute abnormality. SINUSES: The visualized paranasal sinuses and mastoid air cells demonstrate no acute abnormality. SOFT TISSUES/SKULL:  No acute abnormality of the visualized skull or soft tissues. No acute intracranial abnormality. Ct Thoracic Spine Wo Contrast    Result Date: 2/1/2020  EXAMINATION: CT OF THE THORACIC SPINE WITHOUT CONTRAST; CT OF THE LUMBAR SPINE WITHOUT CONTRAST  2/1/2020 12:35 pm: TECHNIQUE: CT of the thoracic spine was performed without the administration of intravenous contrast. Multiplanar reformatted images are provided for review. Dose modulation, iterative reconstruction, and/or weight based adjustment of the mA/kV was utilized to reduce the radiation dose to as low as reasonably achievable.; CT of the lumbar spine was performed without the administration of intravenous contrast. Multiplanar reformatted images are provided for review. Dose modulation, iterative reconstruction, and/or weight based adjustment of the mA/kV was utilized to reduce the radiation dose to as low as reasonably achievable. COMPARISON: None.  HISTORY: ORDERING SYSTEM PROVIDED HISTORY: low back pain TECHNOLOGIST PROVIDED HISTORY: Reason for exam:->low back pain Is the Thoracic Spine W Wo Contrast    Result Date: 1/13/2020  EXAMINATION: MRI OF THE THORACIC SPINE WITHOUT AND WITH CONTRAST  1/13/2020 4:48 pm TECHNIQUE: Multiplanar multisequence MRI of the thoracic spine was performed without and with the administration of intravenous contrast. COMPARISON: None HISTORY: ORDERING SYSTEM PROVIDED HISTORY: lower back pain and fever. ? discitis/osteomyelitis TECHNOLOGIST PROVIDED HISTORY: Reason for exam:->lower back pain and fever. ? discitis/osteomyelitis Is the patient pregnant?->No Reason for Exam: back painback pain//patient moving legs constantly // told several times not to move//had been mediacated and nurse came and gave her more FINDINGS: The exam is limited due to patient motion artifact BONES/ALIGNMENT: There is normal alignment of the spine. The vertebral body heights are maintained. The bone marrow signal appears unremarkable. No acute fracture is identified. There is no gross evidence for osteomyelitis/discitis SPINAL CORD: No abnormal cord signal is seen. SOFT TISSUES:  No abnormal enhancement of the thoracic spine. No paraspinal mass identified. DEGENERATIVE CHANGES: No significant spinal canal stenosis or neural foraminal narrowing of the thoracic spine. Limited exam secondary to patient motion artifact There is no gross evidence for osteomyelitis/discitis within the thoracic spine     Mri Lumbar Spine W Wo Contrast    Result Date: 1/13/2020  EXAMINATION: MRI OF THE LUMBAR SPINE WITHOUT AND WITH CONTRAST  1/13/2020 4:48 pm TECHNIQUE: Multiplanar multisequence MRI of the lumbar spine was performed without and with the administration of intravenous contrast. COMPARISON: None. HISTORY: ORDERING SYSTEM PROVIDED HISTORY: lower back pain,feve. evaluate for disciitis and osteomyelitis. TECHNOLOGIST PROVIDED HISTORY: Reason for exam:->lower back pain,feve. evaluate for disciitis and osteomyelitis.  Is the patient pregnant?->No FINDINGS: BONES/ALIGNMENT: There is normal alignment of the spine. The vertebral body heights are maintained. The bone marrow signal appears unremarkable. No acute fracture is identified SPINAL CORD:  The conus terminates normally. SOFT TISSUES: No abnormal enhancement is seen of the lumbar spine. No paraspinal mass identified. Shallow disc bulge identified T10-11 and T11-12 L1-L2: There is no significant disc protrusion, spinal canal stenosis or neural foraminal narrowing. L2-L3: There is no significant disc protrusion, spinal canal stenosis or neural foraminal narrowing. L3-L4: Annular disc bulge flattens the thecal sac. L4-L5: Annular disc bulge facet arthropathy and ligament hypertrophy results in mild central canal stenosis L5-S1: Annular disc bulge facet arthropathy and ligament hypertrophy results in moderate bilateral foraminal stenosis in mild central canal stenosis There is mild patient motion artifact     Mild central canal stenosis at L4-L5 Mild central canal stenosis and moderate bilateral foraminal stenosis at L5-S1 No MRI evidence for discitis/osteomyelitis     Ct Abdomen Pelvis W Iv Contrast    Result Date: 1/9/2020  EXAMINATION: CT OF THE ABDOMEN AND PELVIS WITH CONTRAST 1/9/2020 6:02 pm TECHNIQUE: CT of the abdomen and pelvis was performed with the administration of intravenous contrast. Multiplanar reformatted images are provided for review. Dose modulation, iterative reconstruction, and/or weight based adjustment of the mA/kV was utilized to reduce the radiation dose to as low as reasonably achievable. COMPARISON: 10/13/2019 HISTORY: ORDERING SYSTEM PROVIDED HISTORY: abdominal pain. states history of pancreatitis TECHNOLOGIST PROVIDED HISTORY: IV contrast only. Thank you. Reason for exam:->abdominal pain. states history of pancreatitis Reason for exam:->IV contrast only. Thank you. Reason for Exam: abdominal pain. states history of pancreatitis Acuity: Acute Type of Exam: Initial Additional signs and symptoms: surg.  hx; involving the dorsal soft tissues of the hand and 3rd digit. Correlate for cellulitis. No organized drainable collection within limits of the exam. 3. Tenosynovitis of the 3rd flexor tendon sheath. 4. Patchy edema of the proximal 3rd phalanx with grossly preserved T1 signal. Findings may reflect reactive noninfectious osteitis, however, early changes of osteomyelitis not excluded. 5. Mild-to-moderate osteoarthritis. Xr Chest Portable    Result Date: 2/1/2020  EXAMINATION: ONE XRAY VIEW OF THE CHEST 2/1/2020 2:06 pm COMPARISON: Chest x-ray January 13, 2020 HISTORY: ORDERING SYSTEM PROVIDED HISTORY: PICC Tip Placement TECHNOLOGIST PROVIDED HISTORY: Reason for exam:->PICC Tip Placement Reason for Exam: PICC Tip Placement Acuity: Unknown FINDINGS: Cardiac silhouette appears stable. Right MediPort described on prior exam has been removed. Left-sided PICC line present with tip projecting over the expected location of the left brachiocephalic vein. No focal consolidation, pleural effusion, or pneumothorax identified. Osseous structures appear stable. 1. No acute cardiopulmonary process identified. 2. Left-sided PICC line identified with tip projecting over the expected location of the left brachiocephalic vein. Xr Chest 1 Vw    Result Date: 1/13/2020  EXAMINATION: ONE XRAY VIEW OF THE CHEST 1/13/2020 7:12 pm COMPARISON: Chest radiograph 10/31/2019. HISTORY: ORDERING SYSTEM PROVIDED HISTORY: fever TECHNOLOGIST PROVIDED HISTORY: Reason for exam:->fever Reason for Exam: fever Acuity: Acute Type of Exam: Initial Additional signs and symptoms: na Relevant Medical/Surgical History: lupus, morbid obesity, copd FINDINGS: A right chest port terminates in the mid superior vena cava. The cardiomediastinal silhouette is unchanged. No pneumothorax, vascular congestion, consolidation, or pleural effusion is identified. No acute osseous abnormality. No acute process.      Ct Lumbar Reconstruction Wo Post disease in the midthoracic spine. 4. Mild degenerative disc disease at L5-S1 with mild facet arthropathy at L4-5 and L5-S1. If there are neurologic symptoms, MRI could be performed for further evaluation. Mri Brain Wo Contrast    Result Date: 1/10/2020  EXAMINATION: MRI OF THE BRAIN WITHOUT CONTRAST  1/9/2020 11:11 pm TECHNIQUE: Multiplanar multisequence MRI of the brain was performed without the administration of intravenous contrast. COMPARISON: CT head without contrast January 9, 2020. HISTORY: ORDERING SYSTEM PROVIDED HISTORY: dizziness for three days TECHNOLOGIST PROVIDED HISTORY: Reason for exam:->dizziness for three days FINDINGS: INTRACRANIAL STRUCTURES/VENTRICLES: There is no acute infarct. No mass effect or midline shift. No evidence of an acute intracranial hemorrhage. The ventricles and sulci are normal in size and configuration. The sellar/suprasellar regions appear unremarkable. The normal signal voids within the major intracranial vessels appear maintained. ORBITS: The visualized portion of the orbits demonstrate no acute abnormality. SINUSES: The visualized paranasal sinuses and mastoid air cells are well aerated. BONES/SOFT TISSUES: The bone marrow signal intensity appears normal. The soft tissues demonstrate no acute abnormality. Unremarkable noncontrast MRI brain examination. MDM:  Patient seen and examined. Labs and imaging obtained. There was a delay in labs, as patient was difficult to obtain blood work. Pic team was consulted for this. Imaging without acute process, chest x-ray no acute disease, CT of the T and L-spine without signs of epidural abscess, do show degenerative changes. Labs are pending at time of completion of this note. Patient is given IV fluids, as well as pain control and antiemetics. Care of patient is transferred to Dr. Donel Angelucci who will follow labs, and disposition patient appropriately.     2:40 PM  Care of patient transferred from me to

## 2020-02-02 LAB
ANION GAP SERPL CALCULATED.3IONS-SCNC: 11 MMOL/L (ref 4–16)
BUN BLDV-MCNC: 15 MG/DL (ref 6–23)
CALCIUM SERPL-MCNC: 9.6 MG/DL (ref 8.3–10.6)
CHLORIDE BLD-SCNC: 104 MMOL/L (ref 99–110)
CO2: 23 MMOL/L (ref 21–32)
CREAT SERPL-MCNC: 0.9 MG/DL (ref 0.6–1.1)
CULTURE: NORMAL
GFR AFRICAN AMERICAN: >60 ML/MIN/1.73M2
GFR NON-AFRICAN AMERICAN: >60 ML/MIN/1.73M2
GLUCOSE BLD-MCNC: 93 MG/DL (ref 70–99)
HCT VFR BLD CALC: 27.8 % (ref 37–47)
HCT VFR BLD CALC: 30.7 % (ref 37–47)
HEMOGLOBIN: 8.5 GM/DL (ref 12.5–16)
HEMOGLOBIN: 9.3 GM/DL (ref 12.5–16)
Lab: NORMAL
MCH RBC QN AUTO: 26.2 PG (ref 27–31)
MCH RBC QN AUTO: 26.9 PG (ref 27–31)
MCHC RBC AUTO-ENTMCNC: 30.3 % (ref 32–36)
MCHC RBC AUTO-ENTMCNC: 30.6 % (ref 32–36)
MCV RBC AUTO: 86.5 FL (ref 78–100)
MCV RBC AUTO: 88 FL (ref 78–100)
PDW BLD-RTO: 13.7 % (ref 11.7–14.9)
PDW BLD-RTO: 13.7 % (ref 11.7–14.9)
PLATELET # BLD: 205 K/CU MM (ref 140–440)
PLATELET # BLD: 224 K/CU MM (ref 140–440)
PMV BLD AUTO: 9.3 FL (ref 7.5–11.1)
PMV BLD AUTO: 9.4 FL (ref 7.5–11.1)
POTASSIUM SERPL-SCNC: 4.4 MMOL/L (ref 3.5–5.1)
RBC # BLD: 3.16 M/CU MM (ref 4.2–5.4)
RBC # BLD: 3.55 M/CU MM (ref 4.2–5.4)
SODIUM BLD-SCNC: 138 MMOL/L (ref 135–145)
SPECIMEN: NORMAL
WBC # BLD: 3.6 K/CU MM (ref 4–10.5)
WBC # BLD: 4 K/CU MM (ref 4–10.5)

## 2020-02-02 PROCEDURE — 6360000002 HC RX W HCPCS: Performed by: FAMILY MEDICINE

## 2020-02-02 PROCEDURE — 85027 COMPLETE CBC AUTOMATED: CPT

## 2020-02-02 PROCEDURE — 6370000000 HC RX 637 (ALT 250 FOR IP): Performed by: FAMILY MEDICINE

## 2020-02-02 PROCEDURE — 94761 N-INVAS EAR/PLS OXIMETRY MLT: CPT

## 2020-02-02 PROCEDURE — 96366 THER/PROPH/DIAG IV INF ADDON: CPT

## 2020-02-02 PROCEDURE — G0378 HOSPITAL OBSERVATION PER HR: HCPCS

## 2020-02-02 PROCEDURE — 2500000003 HC RX 250 WO HCPCS: Performed by: FAMILY MEDICINE

## 2020-02-02 PROCEDURE — 96372 THER/PROPH/DIAG INJ SC/IM: CPT

## 2020-02-02 PROCEDURE — 80048 BASIC METABOLIC PNL TOTAL CA: CPT

## 2020-02-02 RX ORDER — VANCOMYCIN HYDROCHLORIDE 1 G/200ML
1000 INJECTION, SOLUTION INTRAVENOUS EVERY 8 HOURS
Status: DISCONTINUED | OUTPATIENT
Start: 2020-02-03 | End: 2020-02-03

## 2020-02-02 RX ADMIN — OYSTER SHELL CALCIUM WITH VITAMIN D 1 TABLET: 500; 200 TABLET, FILM COATED ORAL at 21:41

## 2020-02-02 RX ADMIN — VANCOMYCIN HYDROCHLORIDE 1000 MG: 1 INJECTION, SOLUTION INTRAVENOUS at 06:18

## 2020-02-02 RX ADMIN — DICYCLOMINE HYDROCHLORIDE 20 MG: 20 TABLET ORAL at 06:20

## 2020-02-02 RX ADMIN — OXYCODONE HYDROCHLORIDE AND ACETAMINOPHEN 1 TABLET: 5; 325 TABLET ORAL at 18:39

## 2020-02-02 RX ADMIN — DICYCLOMINE HYDROCHLORIDE 20 MG: 20 TABLET ORAL at 11:53

## 2020-02-02 RX ADMIN — PROMETHAZINE HYDROCHLORIDE 25 MG: 25 TABLET ORAL at 04:36

## 2020-02-02 RX ADMIN — Medication 1000 UNITS: at 09:05

## 2020-02-02 RX ADMIN — LACOSAMIDE 50 MG: 50 TABLET, FILM COATED ORAL at 21:40

## 2020-02-02 RX ADMIN — ATENOLOL 25 MG: 25 TABLET ORAL at 09:05

## 2020-02-02 RX ADMIN — FERROUS SULFATE TAB 325 MG (65 MG ELEMENTAL FE) 325 MG: 325 (65 FE) TAB at 21:41

## 2020-02-02 RX ADMIN — GABAPENTIN 800 MG: 400 CAPSULE ORAL at 21:40

## 2020-02-02 RX ADMIN — LEVETIRACETAM 1000 MG: 500 TABLET, FILM COATED ORAL at 21:39

## 2020-02-02 RX ADMIN — METOCLOPRAMIDE 10 MG: 10 TABLET ORAL at 06:19

## 2020-02-02 RX ADMIN — PROMETHAZINE HYDROCHLORIDE 25 MG: 25 TABLET ORAL at 18:39

## 2020-02-02 RX ADMIN — ENOXAPARIN SODIUM 40 MG: 40 INJECTION SUBCUTANEOUS at 21:41

## 2020-02-02 RX ADMIN — PAROXETINE HYDROCHLORIDE 40 MG: 20 TABLET, FILM COATED ORAL at 21:40

## 2020-02-02 RX ADMIN — LEVETIRACETAM 1000 MG: 500 TABLET, FILM COATED ORAL at 09:04

## 2020-02-02 RX ADMIN — POTASSIUM CHLORIDE, DEXTROSE MONOHYDRATE AND SODIUM CHLORIDE: 150; 5; 450 INJECTION, SOLUTION INTRAVENOUS at 19:34

## 2020-02-02 RX ADMIN — PANTOPRAZOLE SODIUM 40 MG: 40 TABLET, DELAYED RELEASE ORAL at 06:19

## 2020-02-02 RX ADMIN — GABAPENTIN 800 MG: 400 CAPSULE ORAL at 09:04

## 2020-02-02 RX ADMIN — TIZANIDINE 4 MG: 4 TABLET ORAL at 09:04

## 2020-02-02 RX ADMIN — MULTIPLE VITAMINS W/ MINERALS TAB 1 TABLET: TAB at 09:05

## 2020-02-02 RX ADMIN — VANCOMYCIN HYDROCHLORIDE 1000 MG: 1 INJECTION, SOLUTION INTRAVENOUS at 15:41

## 2020-02-02 RX ADMIN — LEVOTHYROXINE SODIUM 125 MCG: 125 TABLET ORAL at 06:19

## 2020-02-02 RX ADMIN — PROMETHAZINE HYDROCHLORIDE 25 MG: 25 TABLET ORAL at 11:53

## 2020-02-02 RX ADMIN — LACOSAMIDE 50 MG: 50 TABLET, FILM COATED ORAL at 09:04

## 2020-02-02 RX ADMIN — FERROUS SULFATE TAB 325 MG (65 MG ELEMENTAL FE) 325 MG: 325 (65 FE) TAB at 09:05

## 2020-02-02 RX ADMIN — OXYCODONE HYDROCHLORIDE AND ACETAMINOPHEN 1 TABLET: 5; 325 TABLET ORAL at 04:07

## 2020-02-02 RX ADMIN — TIZANIDINE 4 MG: 4 TABLET ORAL at 21:40

## 2020-02-02 RX ADMIN — POTASSIUM CHLORIDE, DEXTROSE MONOHYDRATE AND SODIUM CHLORIDE: 150; 5; 450 INJECTION, SOLUTION INTRAVENOUS at 09:04

## 2020-02-02 RX ADMIN — GABAPENTIN 800 MG: 400 CAPSULE ORAL at 15:41

## 2020-02-02 RX ADMIN — ONDANSETRON 4 MG: 4 TABLET, ORALLY DISINTEGRATING ORAL at 09:05

## 2020-02-02 RX ADMIN — OXYCODONE HYDROCHLORIDE AND ACETAMINOPHEN 1 TABLET: 5; 325 TABLET ORAL at 11:53

## 2020-02-02 RX ADMIN — ESTRADIOL 0.5 MG: 1 TABLET ORAL at 09:05

## 2020-02-02 ASSESSMENT — PAIN DESCRIPTION - LOCATION
LOCATION: ABDOMEN;BACK;LEG
LOCATION: ABDOMEN;BACK;LEG
LOCATION: OTHER (COMMENT);LEG

## 2020-02-02 ASSESSMENT — PAIN DESCRIPTION - DESCRIPTORS
DESCRIPTORS: ACHING;SHARP;DISCOMFORT
DESCRIPTORS: ACHING;DISCOMFORT;SHARP
DESCRIPTORS: SHARP

## 2020-02-02 ASSESSMENT — PAIN SCALES - GENERAL
PAINLEVEL_OUTOF10: 7
PAINLEVEL_OUTOF10: 6
PAINLEVEL_OUTOF10: 7
PAINLEVEL_OUTOF10: 7
PAINLEVEL_OUTOF10: 5
PAINLEVEL_OUTOF10: 7

## 2020-02-02 ASSESSMENT — PAIN DESCRIPTION - FREQUENCY
FREQUENCY: CONTINUOUS

## 2020-02-02 ASSESSMENT — PAIN DESCRIPTION - PROGRESSION
CLINICAL_PROGRESSION: GRADUALLY WORSENING
CLINICAL_PROGRESSION: NOT CHANGED
CLINICAL_PROGRESSION: GRADUALLY WORSENING
CLINICAL_PROGRESSION: GRADUALLY WORSENING
CLINICAL_PROGRESSION: NOT CHANGED
CLINICAL_PROGRESSION: GRADUALLY WORSENING
CLINICAL_PROGRESSION: NOT CHANGED
CLINICAL_PROGRESSION: GRADUALLY WORSENING

## 2020-02-02 ASSESSMENT — PAIN DESCRIPTION - ONSET
ONSET: ON-GOING
ONSET: ON-GOING

## 2020-02-02 ASSESSMENT — PAIN DESCRIPTION - PAIN TYPE
TYPE: ACUTE PAIN

## 2020-02-02 ASSESSMENT — PAIN DESCRIPTION - ORIENTATION: ORIENTATION: LEFT

## 2020-02-03 LAB
CREAT SERPL-MCNC: 0.8 MG/DL (ref 0.6–1.1)
DOSE AMOUNT: ABNORMAL
DOSE TIME: ABNORMAL
GFR AFRICAN AMERICAN: >60 ML/MIN/1.73M2
GFR NON-AFRICAN AMERICAN: >60 ML/MIN/1.73M2
HCT VFR BLD CALC: 29.6 % (ref 37–47)
HEMOGLOBIN: 8.9 GM/DL (ref 12.5–16)
MCH RBC QN AUTO: 26.3 PG (ref 27–31)
MCHC RBC AUTO-ENTMCNC: 30.1 % (ref 32–36)
MCV RBC AUTO: 87.6 FL (ref 78–100)
PDW BLD-RTO: 13.7 % (ref 11.7–14.9)
PLATELET # BLD: 199 K/CU MM (ref 140–440)
PMV BLD AUTO: 9.8 FL (ref 7.5–11.1)
RBC # BLD: 3.38 M/CU MM (ref 4.2–5.4)
VANCOMYCIN TROUGH: 26.8 UG/ML (ref 10–20)
WBC # BLD: 3.9 K/CU MM (ref 4–10.5)

## 2020-02-03 PROCEDURE — 80202 ASSAY OF VANCOMYCIN: CPT

## 2020-02-03 PROCEDURE — 96376 TX/PRO/DX INJ SAME DRUG ADON: CPT

## 2020-02-03 PROCEDURE — 85027 COMPLETE CBC AUTOMATED: CPT

## 2020-02-03 PROCEDURE — G0378 HOSPITAL OBSERVATION PER HR: HCPCS

## 2020-02-03 PROCEDURE — 6360000002 HC RX W HCPCS: Performed by: FAMILY MEDICINE

## 2020-02-03 PROCEDURE — 96366 THER/PROPH/DIAG IV INF ADDON: CPT

## 2020-02-03 PROCEDURE — 6370000000 HC RX 637 (ALT 250 FOR IP): Performed by: FAMILY MEDICINE

## 2020-02-03 PROCEDURE — 2580000003 HC RX 258: Performed by: FAMILY MEDICINE

## 2020-02-03 PROCEDURE — 2500000003 HC RX 250 WO HCPCS: Performed by: FAMILY MEDICINE

## 2020-02-03 PROCEDURE — 94761 N-INVAS EAR/PLS OXIMETRY MLT: CPT

## 2020-02-03 PROCEDURE — 82565 ASSAY OF CREATININE: CPT

## 2020-02-03 RX ORDER — VANCOMYCIN HYDROCHLORIDE 1 G/200ML
1000 INJECTION, SOLUTION INTRAVENOUS EVERY 12 HOURS
Status: DISCONTINUED | OUTPATIENT
Start: 2020-02-03 | End: 2020-02-04 | Stop reason: HOSPADM

## 2020-02-03 RX ORDER — PROCHLORPERAZINE MALEATE 5 MG/1
5 TABLET ORAL EVERY 6 HOURS PRN
Qty: 12 TABLET | Refills: 0 | Status: SHIPPED | OUTPATIENT
Start: 2020-02-03 | End: 2020-07-02

## 2020-02-03 RX ADMIN — PROMETHAZINE HYDROCHLORIDE 25 MG: 25 TABLET ORAL at 01:15

## 2020-02-03 RX ADMIN — OYSTER SHELL CALCIUM WITH VITAMIN D 1 TABLET: 500; 200 TABLET, FILM COATED ORAL at 09:43

## 2020-02-03 RX ADMIN — PROMETHAZINE HYDROCHLORIDE 25 MG: 25 TABLET ORAL at 09:44

## 2020-02-03 RX ADMIN — PAROXETINE HYDROCHLORIDE 40 MG: 20 TABLET, FILM COATED ORAL at 20:49

## 2020-02-03 RX ADMIN — POTASSIUM CHLORIDE, DEXTROSE MONOHYDRATE AND SODIUM CHLORIDE: 150; 5; 450 INJECTION, SOLUTION INTRAVENOUS at 15:13

## 2020-02-03 RX ADMIN — ATENOLOL 25 MG: 25 TABLET ORAL at 09:44

## 2020-02-03 RX ADMIN — DICYCLOMINE HYDROCHLORIDE 20 MG: 20 TABLET ORAL at 06:11

## 2020-02-03 RX ADMIN — DICYCLOMINE HYDROCHLORIDE 20 MG: 20 TABLET ORAL at 00:08

## 2020-02-03 RX ADMIN — TIZANIDINE 4 MG: 4 TABLET ORAL at 20:49

## 2020-02-03 RX ADMIN — POTASSIUM CHLORIDE, DEXTROSE MONOHYDRATE AND SODIUM CHLORIDE: 150; 5; 450 INJECTION, SOLUTION INTRAVENOUS at 06:14

## 2020-02-03 RX ADMIN — OXYCODONE HYDROCHLORIDE AND ACETAMINOPHEN 1 TABLET: 5; 325 TABLET ORAL at 09:42

## 2020-02-03 RX ADMIN — LEVETIRACETAM 1000 MG: 500 TABLET, FILM COATED ORAL at 20:49

## 2020-02-03 RX ADMIN — DICYCLOMINE HYDROCHLORIDE 20 MG: 20 TABLET ORAL at 23:16

## 2020-02-03 RX ADMIN — OYSTER SHELL CALCIUM WITH VITAMIN D 1 TABLET: 500; 200 TABLET, FILM COATED ORAL at 20:48

## 2020-02-03 RX ADMIN — ONDANSETRON 4 MG: 2 INJECTION INTRAMUSCULAR; INTRAVENOUS at 20:48

## 2020-02-03 RX ADMIN — DICYCLOMINE HYDROCHLORIDE 20 MG: 20 TABLET ORAL at 17:39

## 2020-02-03 RX ADMIN — VANCOMYCIN HYDROCHLORIDE 1000 MG: 1 INJECTION, SOLUTION INTRAVENOUS at 00:10

## 2020-02-03 RX ADMIN — OXYCODONE HYDROCHLORIDE AND ACETAMINOPHEN 1 TABLET: 5; 325 TABLET ORAL at 20:54

## 2020-02-03 RX ADMIN — GABAPENTIN 800 MG: 400 CAPSULE ORAL at 15:09

## 2020-02-03 RX ADMIN — GABAPENTIN 800 MG: 400 CAPSULE ORAL at 09:42

## 2020-02-03 RX ADMIN — VANCOMYCIN HYDROCHLORIDE 1000 MG: 1 INJECTION, SOLUTION INTRAVENOUS at 15:13

## 2020-02-03 RX ADMIN — SODIUM CHLORIDE, PRESERVATIVE FREE 10 ML: 5 INJECTION INTRAVENOUS at 09:53

## 2020-02-03 RX ADMIN — PANTOPRAZOLE SODIUM 40 MG: 40 TABLET, DELAYED RELEASE ORAL at 06:11

## 2020-02-03 RX ADMIN — OXYCODONE HYDROCHLORIDE AND ACETAMINOPHEN 1 TABLET: 5; 325 TABLET ORAL at 01:15

## 2020-02-03 RX ADMIN — Medication 1000 UNITS: at 09:43

## 2020-02-03 RX ADMIN — SODIUM CHLORIDE, PRESERVATIVE FREE 10 ML: 5 INJECTION INTRAVENOUS at 20:49

## 2020-02-03 RX ADMIN — LEVETIRACETAM 1000 MG: 500 TABLET, FILM COATED ORAL at 09:42

## 2020-02-03 RX ADMIN — LACOSAMIDE 50 MG: 50 TABLET, FILM COATED ORAL at 09:43

## 2020-02-03 RX ADMIN — MULTIPLE VITAMINS W/ MINERALS TAB 1 TABLET: TAB at 09:43

## 2020-02-03 RX ADMIN — GABAPENTIN 800 MG: 400 CAPSULE ORAL at 20:49

## 2020-02-03 RX ADMIN — LACOSAMIDE 50 MG: 50 TABLET, FILM COATED ORAL at 20:49

## 2020-02-03 RX ADMIN — TIZANIDINE 4 MG: 4 TABLET ORAL at 09:44

## 2020-02-03 RX ADMIN — DICYCLOMINE HYDROCHLORIDE 20 MG: 20 TABLET ORAL at 11:20

## 2020-02-03 RX ADMIN — ESTRADIOL 0.5 MG: 1 TABLET ORAL at 09:43

## 2020-02-03 RX ADMIN — LEVOTHYROXINE SODIUM 125 MCG: 125 TABLET ORAL at 06:11

## 2020-02-03 ASSESSMENT — PAIN DESCRIPTION - DIRECTION: RADIATING_TOWARDS: BACK OF LEGS

## 2020-02-03 ASSESSMENT — PAIN DESCRIPTION - PROGRESSION
CLINICAL_PROGRESSION: NOT CHANGED

## 2020-02-03 ASSESSMENT — PAIN SCALES - GENERAL
PAINLEVEL_OUTOF10: 7
PAINLEVEL_OUTOF10: 7
PAINLEVEL_OUTOF10: 8
PAINLEVEL_OUTOF10: 7
PAINLEVEL_OUTOF10: 7
PAINLEVEL_OUTOF10: 0

## 2020-02-03 ASSESSMENT — PAIN DESCRIPTION - FREQUENCY
FREQUENCY: CONTINUOUS
FREQUENCY: CONTINUOUS

## 2020-02-03 ASSESSMENT — PAIN DESCRIPTION - ORIENTATION
ORIENTATION: RIGHT;LEFT
ORIENTATION: RIGHT;LEFT

## 2020-02-03 ASSESSMENT — PAIN DESCRIPTION - PAIN TYPE
TYPE: ACUTE PAIN
TYPE: ACUTE PAIN

## 2020-02-03 ASSESSMENT — PAIN - FUNCTIONAL ASSESSMENT: PAIN_FUNCTIONAL_ASSESSMENT: PREVENTS OR INTERFERES SOME ACTIVE ACTIVITIES AND ADLS

## 2020-02-03 ASSESSMENT — PAIN DESCRIPTION - LOCATION
LOCATION: ABDOMEN;BACK
LOCATION: ABDOMEN;BACK

## 2020-02-03 ASSESSMENT — PAIN DESCRIPTION - DESCRIPTORS
DESCRIPTORS: ACHING;DISCOMFORT;SHARP
DESCRIPTORS: ACHING;DISCOMFORT

## 2020-02-03 ASSESSMENT — PAIN DESCRIPTION - ONSET: ONSET: ON-GOING

## 2020-02-03 NOTE — DISCHARGE SUMMARY
Patient: Briana Garzon MD            Attending Progress Note      PCP: Aliyah Coleman MD    Patient: Alexia Cano   Gender: female  : 1970   Age: 44 y.o. MRN: [id number]      Date of Admission: 2020    Chief Complaint:   Chief Complaint   Patient presents with    Emesis     onset 4 days/ receiving IV treatment for Blood infection of MRSA / discharged from hospital 9 days ago/ home care unable to get labs from picc so unable to receive antibiotics on Vanco           Subjective: N/V/diarrhea .         Medications:  Reviewed  Infusion Medications    dextrose 5% and 0.45% NaCl with KCl 20 mEq 100 mL/hr at 20 1513     Scheduled Medications    vancomycin  1,000 mg Intravenous Q12H    PARoxetine  40 mg Oral Nightly    gabapentin  800 mg Oral TID    levothyroxine  125 mcg Oral Daily    therapeutic multivitamin-minerals  1 tablet Oral Daily    calcium-vitamin D  1 tablet Oral BID    estradiol  0.5 mg Oral Daily    dicyclomine  20 mg Oral Q6H    atenolol  25 mg Oral Daily    tiZANidine  4 mg Oral BID    ferrous sulfate  325 mg Oral BID    vitamin D  1,000 Units Oral Daily    levETIRAcetam  1,000 mg Oral BID    lacosamide  50 mg Oral BID    pantoprazole  40 mg Oral QAM AC    sodium chloride flush  10 mL Intravenous 2 times per day    enoxaparin  40 mg Subcutaneous Nightly    metoclopramide  10 mg Oral 4x Daily AC & HS     PRN Meds: ondansetron, promethazine, sodium chloride flush, magnesium hydroxide, ondansetron, promethazine, oxyCODONE-acetaminophen, prochlorperazine      Intake/Output Summary (Last 24 hours) at 2/3/2020 2039  Last data filed at 2/3/2020 1730  Gross per 24 hour   Intake 1466 ml   Output --   Net 1466 ml       Exam:  BP (!) 119/55   Pulse 76   Temp 98.4 °F (36.9 °C) (Oral)   Resp 16   Ht 5' 4\" (1.626 m)   Wt 274 lb (124.3 kg)   SpO2 96%   BMI 47.03 kg/m²   General appearance: No distress,   Respiratory:  good air entry , no Rales , No wheezing, or rhonchi,  Cardiovascular: RRR, with normal S1/S2 without murmurs. Abdomen : Soft, non-tender, non-distended  , normal bowel sounds. Legs : No edema bilaterally. No DVT signs ,    Neurologic:  Alert and oriented ,        Labs:   Recent Labs     02/02/20  0538 02/02/20  2158 02/03/20  0533   WBC 3.6* 4.0 3.9*   HGB 8.5* 9.3* 8.9*   HCT 27.8* 30.7* 29.6*    224 199     Recent Labs     02/01/20  1420 02/02/20  2158 02/03/20  0533    138  --    K 4.3 4.4  --     104  --    CO2 23 23  --    BUN 21 15  --    CREATININE 0.8 0.9 0.8   CALCIUM 9.5 9.6  --      Recent Labs     02/01/20  1420   AST 15   ALT 13   BILITOT 0.2   ALKPHOS 106     No results for input(s): INR in the last 72 hours. No results for input(s): Freddy Aldana in the last 72 hours. Assessment/Plan:    Active Hospital Problems    Diagnosis Date Noted    Nausea & vomiting [R11.2] 04/19/2013     Priority: High    Bacteremia due to methicillin resistant Staphylococcus aureus [R78.81] 02/01/2020    Abdominal pain, epigastric [R10.13]        Plan for discharge but pt had diarrhea and questionable vomiting with reddish color emesis . . hold on d/c . Kenia Hooker and GI consult   Tele   Cont D5 1/2 nacl with KCL  Full liquid diet   Symptomatic treatment , Zofran, Phenergan, compzinem  Will avoid  dilaudid, Xana x or Ativan   Cont Vanco , pharmacy consult   Blood c/s x 2 : pending   DVT proph      Diet: DIET FULL LIQUID;  Code Status: Full Code     Treatment progress and plan was d/w pt/family . DVT Prophylaxis:  Diet: DIET FULL LIQUID;  Code Status: Full Code    Treatment progress and plan was d/w pt/family .       Signed:    Richard Deras MD   2/3/2020

## 2020-02-03 NOTE — CARE COORDINATION
Reviewed chart and pt discharge home with [address] Cheng. Leandra Alcantara notified and states ready for pt's discharge.
as need for IV Vanco. There were no alternatives to admission to explore at this time. [address]ene phoned [address] [place] [address] Rd- on hold for 14.5 minutes and LSW left VM about pt being admitted. Left call back # for questions.

## 2020-02-04 VITALS
RESPIRATION RATE: 16 BRPM | WEIGHT: 274 LBS | OXYGEN SATURATION: 96 % | BODY MASS INDEX: 46.78 KG/M2 | DIASTOLIC BLOOD PRESSURE: 65 MMHG | HEART RATE: 67 BPM | TEMPERATURE: 98.1 F | HEIGHT: 64 IN | SYSTOLIC BLOOD PRESSURE: 129 MMHG

## 2020-02-04 LAB
CREAT SERPL-MCNC: 0.9 MG/DL (ref 0.6–1.1)
DOSE AMOUNT: ABNORMAL
DOSE TIME: ABNORMAL
GFR AFRICAN AMERICAN: >60 ML/MIN/1.73M2
GFR NON-AFRICAN AMERICAN: >60 ML/MIN/1.73M2
VANCOMYCIN TROUGH: 20.5 UG/ML (ref 10–20)

## 2020-02-04 PROCEDURE — 96366 THER/PROPH/DIAG IV INF ADDON: CPT

## 2020-02-04 PROCEDURE — 96376 TX/PRO/DX INJ SAME DRUG ADON: CPT

## 2020-02-04 PROCEDURE — G0378 HOSPITAL OBSERVATION PER HR: HCPCS

## 2020-02-04 PROCEDURE — 6370000000 HC RX 637 (ALT 250 FOR IP): Performed by: FAMILY MEDICINE

## 2020-02-04 PROCEDURE — 36593 DECLOT VASCULAR DEVICE: CPT

## 2020-02-04 PROCEDURE — 80202 ASSAY OF VANCOMYCIN: CPT

## 2020-02-04 PROCEDURE — 6360000002 HC RX W HCPCS: Performed by: FAMILY MEDICINE

## 2020-02-04 PROCEDURE — 2500000003 HC RX 250 WO HCPCS: Performed by: FAMILY MEDICINE

## 2020-02-04 PROCEDURE — 82565 ASSAY OF CREATININE: CPT

## 2020-02-04 PROCEDURE — 2580000003 HC RX 258: Performed by: FAMILY MEDICINE

## 2020-02-04 RX ADMIN — SODIUM CHLORIDE, PRESERVATIVE FREE 10 ML: 5 INJECTION INTRAVENOUS at 10:25

## 2020-02-04 RX ADMIN — DICYCLOMINE HYDROCHLORIDE 20 MG: 20 TABLET ORAL at 10:26

## 2020-02-04 RX ADMIN — ALTEPLASE 1 MG: 2.2 INJECTION, POWDER, LYOPHILIZED, FOR SOLUTION INTRAVENOUS at 11:53

## 2020-02-04 RX ADMIN — GABAPENTIN 800 MG: 400 CAPSULE ORAL at 10:26

## 2020-02-04 RX ADMIN — LEVOTHYROXINE SODIUM 125 MCG: 125 TABLET ORAL at 06:24

## 2020-02-04 RX ADMIN — OXYCODONE HYDROCHLORIDE AND ACETAMINOPHEN 1 TABLET: 5; 325 TABLET ORAL at 05:13

## 2020-02-04 RX ADMIN — ATENOLOL 25 MG: 25 TABLET ORAL at 10:27

## 2020-02-04 RX ADMIN — VANCOMYCIN HYDROCHLORIDE 1000 MG: 1 INJECTION, SOLUTION INTRAVENOUS at 01:20

## 2020-02-04 RX ADMIN — LEVETIRACETAM 1000 MG: 500 TABLET, FILM COATED ORAL at 10:26

## 2020-02-04 RX ADMIN — PANTOPRAZOLE SODIUM 40 MG: 40 TABLET, DELAYED RELEASE ORAL at 05:14

## 2020-02-04 RX ADMIN — ALTEPLASE 1 MG: 2.2 INJECTION, POWDER, LYOPHILIZED, FOR SOLUTION INTRAVENOUS at 11:54

## 2020-02-04 RX ADMIN — OXYCODONE HYDROCHLORIDE AND ACETAMINOPHEN 1 TABLET: 5; 325 TABLET ORAL at 12:41

## 2020-02-04 RX ADMIN — ONDANSETRON 4 MG: 2 INJECTION INTRAMUSCULAR; INTRAVENOUS at 05:14

## 2020-02-04 RX ADMIN — TIZANIDINE 4 MG: 4 TABLET ORAL at 10:25

## 2020-02-04 RX ADMIN — LACOSAMIDE 50 MG: 50 TABLET, FILM COATED ORAL at 10:26

## 2020-02-04 RX ADMIN — DICYCLOMINE HYDROCHLORIDE 20 MG: 20 TABLET ORAL at 05:13

## 2020-02-04 RX ADMIN — ESTRADIOL 0.5 MG: 1 TABLET ORAL at 10:26

## 2020-02-04 RX ADMIN — MULTIPLE VITAMINS W/ MINERALS TAB 1 TABLET: TAB at 10:26

## 2020-02-04 RX ADMIN — PROMETHAZINE HYDROCHLORIDE 25 MG: 25 TABLET ORAL at 01:20

## 2020-02-04 RX ADMIN — POTASSIUM CHLORIDE, DEXTROSE MONOHYDRATE AND SODIUM CHLORIDE: 150; 5; 450 INJECTION, SOLUTION INTRAVENOUS at 01:20

## 2020-02-04 RX ADMIN — PROMETHAZINE HYDROCHLORIDE 12.5 MG: 25 TABLET ORAL at 12:41

## 2020-02-04 RX ADMIN — Medication 1000 UNITS: at 10:26

## 2020-02-04 ASSESSMENT — PAIN SCALES - GENERAL
PAINLEVEL_OUTOF10: 6
PAINLEVEL_OUTOF10: 7

## 2020-02-04 ASSESSMENT — PAIN DESCRIPTION - PROGRESSION
CLINICAL_PROGRESSION: NOT CHANGED

## 2020-02-04 NOTE — PROGRESS NOTES
[address] [facility]  consulted by Dr. Rory Haley for monitoring and adjustment. Indication for treatment: MRSA CRBSI  Goal trough: 15 mcg/mL     Pertinent Laboratory Values:   Temp Readings from Last 3 Encounters:   02/03/20 98.6 °F (37 °C) (Oral)   01/23/20 98.2 °F (36.8 °C) (Oral)   10/31/19 98.1 °F (36.7 °C) (Oral)     Recent Labs     02/01/20  1420 02/02/20  0538 02/02/20 2158 02/03/20  0533   WBC 4.0 3.6* 4.0 3.9*   LACTATE 0.4  --   --   --      Recent Labs     02/01/20  1420 02/02/20 2158 02/03/20  0533   BUN 21 15  --    CREATININE 0.8 0.9 0.8     Estimated Creatinine Clearance: 108 mL/min (based on SCr of 0.8 mg/dL). Intake/Output Summary (Last 24 hours) at 2/3/2020 1148  Last data filed at 2/3/2020 0947  Gross per 24 hour   Intake 1166 ml   Output --   Net 1166 ml       Pertinent Cultures:  Date    Source    Results  2/1   Urine    No growth  2/1   Blood    Ordered    Vancomycin level:   TROUGH:    Recent Labs     02/03/20  0729   VANCOTROUGH 26.8*     RANDOM:    Recent Labs     02/01/20  1953   VANCORANDOM 11.8  (NOTE)  Therapeutic Range Interpretation: Please refer to current dosing   guidelines. Assessment:  · WBC and temperature: WBC @4.0 yesterday; pt afebrile. · SCr, BUN, and urine output: SCR remains normal, no output data  · Day(s) of therapy: continued from outpatient (day #2 in house)  · Vancomycin level:   · 11.8 mcg/mL (2/1/20 random level, prior to resuming therapy from outpatient)  · 26.8 mcg/ml (2/3/20 trough level - 8 hour level this morning)    Plan:  · Vancomycin level this am supratherapeutic @26.8, SCR remains normal.  · Dx = MRSA bacteremia  · Decrease vancomycin to 1gm ivpb q12h (will also push back schedule a couple hours)   · During previous admission at [address] [place] [address], patient was received vancomycin 1000 mg IVPB q8h with a therapeutic trough level. This dosing was continued as an outpatient.   · Renal trends unchanged since last admission, will
[facility]  consulted by Dr. Nilda So for monitoring and adjustment. Indication for treatment: MRSA CRBSI  Goal trough: 15 mcg/mL     Pertinent Laboratory Values:   Temp Readings from Last 3 Encounters:   02/02/20 97.9 °F (36.6 °C) (Oral)   01/23/20 98.2 °F (36.8 °C) (Oral)   10/31/19 98.1 °F (36.7 °C) (Oral)     Recent Labs     02/01/20  1420 02/02/20  0538   WBC 4.0 3.6*   LACTATE 0.4  --      Recent Labs     02/01/20  1420   BUN 21   CREATININE 0.8     Estimated Creatinine Clearance: 108 mL/min (based on SCr of 0.8 mg/dL). Intake/Output Summary (Last 24 hours) at 2/2/2020 1517  Last data filed at 2/2/2020 1412  Gross per 24 hour   Intake 0 ml   Output --   Net 0 ml       Pertinent Cultures:  Date    Source    Results  2/1   Urine    Pending  2/1   Blood    Ordered    Vancomycin level:   TROUGH:  No results for input(s): VANCOTROUGH in the last 72 hours. RANDOM:    Recent Labs     02/01/20 1953   VANCORANDOM 11.8  (NOTE)  Therapeutic Range Interpretation: Please refer to current dosing   guidelines. Assessment:  · WBC and temperature: WBC @ 3.6; Afebrile. · SCr, BUN, and urine output: Previously WNL  · Day(s) of therapy: continued from outpatient  · Vancomycin level:   · 11.8 mcg/mL (random level, prior to resuming therapy from outpatient)    Plan:  · A random vancomycin level was obtained yesterday and found to be < 15 mcg/mL and vancomycin therapy was resumed. · During previous admission at [facility], patient was received vancomycin 1000 mg IVPB q8h with a therapeutic trough level. This dosing was continued as an outpatient. · Plan to continue vancomycin 1000 mg IVPB q8h. · Renal trends unchanged since last admission. Continue close monitoring with q8h interval.  · Plan to obtain a trough level: 02-03-20 @ 0530. · Pharmacy will continue to monitor patient and adjust therapy as indicated.     Thank you for the consult,    Dannie Jeffrey, PharmD, [facility]
Pt dcing today 2/4/2020 & will initiate [facility].
Pt has episodes of nausea tonight but no vomiting.
Pt has remained alert and oriented. Pt is up independently. Pt is voiding and had one bout of emesis today. Emesis was red but not sure if it was blood Dr Shirley Gonzalez was notified. Pt is taking percocet and phenergan po for pain and nausea. Pt is not tolerating po intake very well. Pt picc line continues to infuse without problems.
[facility] Liaison aware of discharge & will initiate [facility]. Pt reports does not need a nurse tonight and has 2 bags of Vanco left at home.
wheezing, or rhonchi,  Cardiovascular: RRR, with normal S1/S2 without murmurs. Abdomen : Soft, non-tender, non-distended  , normal bowel sounds. Legs : No edema bilaterally. No DVT signs ,    Neurologic:  Alert and oriented ,        Labs:   Recent Labs     02/01/20  1420 02/02/20  0538   WBC 4.0 3.6*   HGB 9.4* 8.5*   HCT 30.7* 27.8*    205     Recent Labs     02/01/20  1420      K 4.3      CO2 23   BUN 21   CREATININE 0.8   CALCIUM 9.5     Recent Labs     02/01/20  1420   AST 15   ALT 13   BILITOT 0.2   ALKPHOS 106     No results for input(s): INR in the last 72 hours. No results for input(s): Alethea Pica in the last 72 hours. Assessment/Plan:    Active Hospital Problems    Diagnosis Date Noted    Nausea & vomiting [R11.2] 04/19/2013     Priority: High    Bacteremia due to methicillin resistant Staphylococcus aureus [R78.81] 02/01/2020    Abdominal pain, epigastric [R10.13]      Tele   Cont D5 1/2 nacl with KCL  Full liquid diet   Symptomatic treatment , Zofran, Phenergan, compzinem  Will avoid  dilaudid, Xana x or Ativan   Cont Vanco , pharmacy consult   Blood c/s x 2 : pending   DVT proph     Diet: DIET FULL LIQUID;  Code Status: Full Code    Treatment progress and plan was d/w pt/family .         Carlee Mcburney, MD

## 2020-02-04 NOTE — CONSULTS
Declot to single lumen PICC successful after 90 Minute cathflow dwell. Withdrew 5cc waste without difficulty, flushed briskly with 10cc NS, new cap and alcohol cap applied.
Patient presents for c/o Left PICC not drawing blood since Monday. PICC line flushes easily, no blood return after several trouble shooting maneuvers. Chest Xray shows PICC tip is past the sublclavian therefore safe to continue use for infusions. Plan to instill Cathflo 2mg to attempt declot. Patient's blood drawn peripherally without difficulty using ultrasound guidance, lab tubes taken by primary RN Jason Kuhn. Dr. Shanna Mueller and Jason Kuhn RN updated on plan of care.
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 The patient also has a history of  acute/recurrent/chronic pancreatitis, anemia, chronic back pain,  fibromyalgia, recurrent episodes of upper GI bleeding with multiple EGDs  as above mentioned. FAMILY HISTORY:  The patient's father was diagnosed with carcinoma of  the prostate, maternal aunt with carcinoma of the breast, maternal  grandmother with carcinoma of the stomach, maternal grandfather with  carcinoma of the lung. MEDICATIONS:  Please refer to the chart. SOCIOECONOMIC HISTORY:  No history of EtOH abuse. The patient does not  smoke cigarettes. PAST SURGICAL HISTORY:  The patient has had total abdominal  hysterectomy, cholecystectomy, Mediport placed, which got infected and  was removed by Dr. 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 The patient also has had multiple  EGDs done and at least one colonoscopy by Dr. Adrienne Hill about 8 years ago. 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 The patient  had 2 EGDs done by Dr. La Castro on 2010 and then again in 2010. The patient also had sleeve gastrectomy done by Dr. Orin Smith on  2019 along with hiatal hernia repair and also had 4 EGDs done by  Dr. Orin Smith and the last EGD by myself was on 2019. The patient  had surgery done on left foot also for osteomyelitis approximately 6  months ago. ALLERGIES:  The patient is allergic to ASPIRIN, COMPAZINE, REGLAN,  SHELLFISH, DAIRY PRODUCTS, and TORADOL.     PHYSICAL EXAMINATION:  GENERAL:  Shows a 57-year-old white female who is obese, lying flat

## 2020-02-04 NOTE — DISCHARGE SUMMARY
Take 1 tablet by mouth daily      tiZANidine (ZANAFLEX) 4 MG tablet Take 4 mg by mouth 2 times daily      estradiol (ESTRACE) 0.5 MG tablet Take 0.5 mg by mouth daily      dicyclomine (BENTYL) 20 MG tablet Take 20 mg by mouth every 6 hours      atenolol (TENORMIN) 25 MG tablet Take 25 mg by mouth daily      Multiple Vitamin (MVI, BARIATRIC ADVANTAGE MULTI-FORMULA, CHEW TAB) Take 1 tablet by mouth daily  Qty: 30 tablet, Refills: 5      Calcium Citrate-Vitamin D (CALCIUM + VIT D, BARIATRIC ADVANTAGE, CHEWABLE TABLET) Take 1 tablet by mouth 2 times daily  Qty: 120 tablet, Refills: 5      levothyroxine (SYNTHROID) 125 MCG tablet Take 1 tablet by mouth Daily  Qty: 30 tablet, Refills: 0      gabapentin (NEURONTIN) 800 MG tablet Take 800 mg by mouth 3 times daily      PARoxetine (PAXIL) 30 MG tablet Take 40 mg by mouth nightly       levETIRAcetam (KEPPRA) 1000 MG tablet Take 1 tablet by mouth 2 times daily  Qty: 60 tablet, Refills: 3      lacosamide (VIMPAT) 50 MG TABS tablet Take 1 tablet by mouth 2 times daily for 14 days. Qty: 28 tablet, Refills: 0    Associated Diagnoses: Tonic clonic convulsion (HCC)      Nutritional Supplements (ENSURE HIGH PROTEIN) LIQD Take 1 Can by mouth Daily with lunch  Qty: 32 Can, Refills: 3      ferrous sulfate (FE TABS) 325 (65 Fe) MG EC tablet Take 1 tablet by mouth 2 times daily  Qty: 60 tablet, Refills: 5           Current Discharge Medication List          Discharge ROS:  A complete review of systems was asked and negative except for abd pain /nv. Discharge Exam:    /66   Pulse 58   Temp 98.1 °F (36.7 °C) (Oral)   Resp 16   Ht 5' 4\" (1.626 m)   Wt 274 lb (124.3 kg)   SpO2 94%   BMI 47.03 kg/m²   General appearance:  NAD  Heart[de-identified] Normal s1/s2, RRR, no murmurs, gallops, or rubs. No leg edema  Lungs:  Clear to auscultation, bilaterally without Rales/Wheezes/Rhonchi.   Abdomen: Soft, non-tender, non-distended, bowel sounds present  Musculoskeletal:   no cyanosis, no edema  Neurologic:  Cranial nerves: II-XII intact, grossly non-focal.  Psychiatric:  A & O x3      Labs: For convenience and continuity at follow-up the following most recent labs are provided:    Lab Results   Component Value Date    WBC 3.9 02/03/2020    HGB 8.9 02/03/2020    HCT 29.6 02/03/2020    MCV 87.6 02/03/2020     02/03/2020     02/02/2020    K 4.4 02/02/2020     02/02/2020    CO2 23 02/02/2020    BUN 15 02/02/2020    CREATININE 0.8 02/03/2020    CALCIUM 9.6 02/02/2020    PHOS 4.4 12/04/2015    BNP 88 11/26/2010    ALKPHOS 106 02/01/2020    ALT 13 02/01/2020    AST 15 02/01/2020    BILITOT 0.2 02/01/2020    BILIDIR 0.2 01/02/2016    LABALBU 3.5 02/01/2020    LABALBU 8 11/18/2015    TRIG 161 07/23/2015     Lab Results   Component Value Date    INR 0.93 01/10/2020    INR ? 01/09/2020    INR 0.93 10/31/2019           Chart review shows recent radiographs:  Ct Abdomen Pelvis Wo Contrast Additional Contrast? None    Result Date: 2/1/2020  EXAMINATION: CT OF THE ABDOMEN AND PELVIS WITHOUT CONTRAST 2/1/2020 12:35 pm TECHNIQUE: CT of the abdomen and pelvis was performed without the administration of intravenous contrast. Multiplanar reformatted images are provided for review. Dose modulation, iterative reconstruction, and/or weight based adjustment of the mA/kV was utilized to reduce the radiation dose to as low as reasonably achievable. COMPARISON: Abdominal/pelvic CT, 01/09/2020. HISTORY: ORDERING SYSTEM PROVIDED HISTORY: abdominal pain TECHNOLOGIST PROVIDED HISTORY: Reason for exam:->abdominal pain Additional Contrast?->None Is the patient pregnant?->No Reason for Exam: abdominal pain Acuity: Acute Type of Exam: Initial Additional signs and symptoms: emesis; MRSA FINDINGS: Lower Chest:  The visualized lung bases are unchanged with few tiny scattered calcified granulomas. The visualized cardiac and posterior mediastinal structures are unremarkable. Organs:   Within the abdomen, the unenhanced osseous abnormality of the lumbar spine. 2. Mild degenerative changes greatest at L5-S1. Xr Hand Right (min 3 Views)    Result Date: 1/16/2020  EXAMINATION: THREE XRAY VIEWS OF THE RIGHT HAND 1/16/2020 6:15 pm COMPARISON: CT hand 01/14/2020 HISTORY: ORDERING SYSTEM PROVIDED HISTORY: painful swelling TECHNOLOGIST PROVIDED HISTORY: Reason for exam:->painful swelling Reason for Exam: painful swelling Acuity: Acute Type of Exam: Initial Additional signs and symptoms: none Relevant Medical/Surgical History: none FINDINGS: Flexion deformities of the distal interphalangeal joint of the 3rd digit and proximal interphalangeal joint of the 5th digit suggest previous injuries. There is no acute fracture identified. There is multifocal joint space narrowing involving the proximal and distal interphalangeal joints, most notably at the 2nd and 5th digit. No erosions are identified. Diffuse soft tissue swelling of the dorsum of the hand is noted as well. There is no acute fracture identified. 1. Diffuse soft tissue swelling of the dorsum of the hand of uncertain etiology. 2. No acute osseous abnormality evident. 3. Suspected remote posttraumatic deformity of the distal interphalangeal joint of the 3rd digit and proximal interphalangeal joint of the 5th digit. 4. Multifocal osteoarthritis. Ct Head Wo Contrast    Result Date: 1/9/2020  EXAMINATION: CT OF THE HEAD WITHOUT CONTRAST  1/9/2020 6:06 pm TECHNIQUE: CT of the head was performed without the administration of intravenous contrast. Dose modulation, iterative reconstruction, and/or weight based adjustment of the mA/kV was utilized to reduce the radiation dose to as low as reasonably achievable. COMPARISON: 10/01/2019 HISTORY: ORDERING SYSTEM PROVIDED HISTORY: states headache and vision changes two days ago TECHNOLOGIST PROVIDED HISTORY: Reason for exam:->states headache and vision changes two days ago Has a \"code stroke\" or \"stroke alert\" been called? ->No Is the fracture is identified. The bony spinal canal is broad. The facet joints appear in anatomic alignment. Minimal disc space narrowing noted at multiple levels in the midthoracic spine. Minor syndesmophyte formation noted in the lower thoracic spine. Lumbar spine: In the lumbar spine, the vertebrae appear normal in height and alignment. Vacuum disc phenomenon noted at L5-S1. There is mild facet arthropathy at L4-5 and L5-S1. No evidence of paravertebral soft tissue edema, fracture or pars defect. The bony spinal canal is broad and the facet joints appear in anatomic alignment. No malalignment of the SI joints is identified. 1. No acute osseous abnormality of the thoracic spine. 2. No acute osseous abnormality of the lumbar spine. 3. Mild multilevel degenerative disc disease in the midthoracic spine. 4. Mild degenerative disc disease at L5-S1 with mild facet arthropathy at L4-5 and L5-S1. If there are neurologic symptoms, MRI could be performed for further evaluation. Mra Head Wo Contrast    Result Date: 1/10/2020  EXAMINATION: MRA OF THE HEAD WITHOUT CONTRAST 1/9/2020 11:40 pm TECHNIQUE: MRA of the head was performed utilizing time-of-flight imaging with MIP images. No intravenous contrast was administered. COMPARISON: None HISTORY: ORDERING SYSTEM PROVIDED HISTORY: dizziness TECHNOLOGIST PROVIDED HISTORY: Reason for exam:->dizziness Is the patient pregnant?->No FINDINGS: ANTERIOR CIRCULATION: No significant stenosis of the intracranial internal carotid, anterior cerebral, or middle cerebral arteries. POSTERIOR CIRCULATION: No significant stenosis of the vertebral, basilar, or posterior cerebral arteries. Unremarkable noncontrast MR angiogram of the head.      Mri Thoracic Spine W Wo Contrast    Result Date: 1/13/2020  EXAMINATION: MRI OF THE THORACIC SPINE WITHOUT AND WITH CONTRAST  1/13/2020 4:48 pm TECHNIQUE: Multiplanar multisequence MRI of the thoracic spine was performed without and with the the lumbar spine. No paraspinal mass identified. Shallow disc bulge identified T10-11 and T11-12 L1-L2: There is no significant disc protrusion, spinal canal stenosis or neural foraminal narrowing. L2-L3: There is no significant disc protrusion, spinal canal stenosis or neural foraminal narrowing. L3-L4: Annular disc bulge flattens the thecal sac. L4-L5: Annular disc bulge facet arthropathy and ligament hypertrophy results in mild central canal stenosis L5-S1: Annular disc bulge facet arthropathy and ligament hypertrophy results in moderate bilateral foraminal stenosis in mild central canal stenosis There is mild patient motion artifact     Mild central canal stenosis at L4-L5 Mild central canal stenosis and moderate bilateral foraminal stenosis at L5-S1 No MRI evidence for discitis/osteomyelitis     Ct Abdomen Pelvis W Iv Contrast    Result Date: 1/9/2020  EXAMINATION: CT OF THE ABDOMEN AND PELVIS WITH CONTRAST 1/9/2020 6:02 pm TECHNIQUE: CT of the abdomen and pelvis was performed with the administration of intravenous contrast. Multiplanar reformatted images are provided for review. Dose modulation, iterative reconstruction, and/or weight based adjustment of the mA/kV was utilized to reduce the radiation dose to as low as reasonably achievable. COMPARISON: 10/13/2019 HISTORY: ORDERING SYSTEM PROVIDED HISTORY: abdominal pain. states history of pancreatitis TECHNOLOGIST PROVIDED HISTORY: IV contrast only. Thank you. Reason for exam:->abdominal pain. states history of pancreatitis Reason for exam:->IV contrast only. Thank you. Reason for Exam: abdominal pain. states history of pancreatitis Acuity: Acute Type of Exam: Initial Additional signs and symptoms: surg. hx; hysterectomy,gallbladder,hernia,appendectomy,gastric sleeve Relevant Medical/Surgical History: 80 ml isovue 370 used. FINDINGS: Lower Chest: No acute airspace disease. No pleural or pericardial effusion Organs: Status post cholecystectomy.   Pneumobilia again noted. The liver otherwise is unremarkable. The kidneys, adrenal glands, spleen and pancreas unchanged appearance. GI/Bowel: Previous gastric surgery. The bowel loops are not dilated. No focal bowel wall thickening. Moderate stool burden seen throughout the colon. Pelvis: No mass lesions. No abnormal fluid collections. No bladder or ureteral stones. Peritoneum/Retroperitoneum: No adenopathy. No extraluminal gas. Bones/Soft Tissues: No soft tissue hernia. No acute fracture. No lytic or blastic lesion. 1. No acute abnormality seen in the abdomen or pelvis 2. Status post cholecystectomy 3. No obstructive uropathy 4. Moderate stool burden seen throughout the colon     Ct Hand Right Wo Contrast    Result Date: 1/14/2020  EXAMINATION: CT OF THE RIGHT HAND WITHOUT CONTRAST, 1/14/2020 3:03 pm TECHNIQUE: CT of the right hand was performed without the administration of intravenous contrast.  Multiplanar reformatted images are provided for review. Dose modulation, iterative reconstruction, and/or weight based adjustment of the mA/kV was utilized to reduce the radiation dose to as low as reasonably achievable. COMPARISON: Radiographs October 27, 2010 HISTORY ORDERING SYSTEM PROVIDED HISTORY: Tenosynovitis TECHNOLOGIST PROVIDED HISTORY: Reason for exam:->Tenosynovitis Is the patient pregnant?->No Reason for Exam: Tenosynovitis Acuity: Acute Type of Exam: Initial Relevant Medical/Surgical History: None FINDINGS: No acute fracture. Narrowing of the interphalangeal joint spaces. Mild narrowing of the 1st carpometacarpal joint space. No erosive changes. No acute soft tissue abnormality. Mild osteoarthritis.      Mri Hand Right Wo Contrast    Result Date: 1/21/2020  EXAMINATION: MRI OF THE RIGHT HAND WITHOUT CONTRAST, 1/21/2020 5:22 pm TECHNIQUE: Multiplanar multisequence MRI of the right hand was performed without the administration of intravenous contrast. COMPARISON: Right hand radiograph January 16, 2020; CT right hand January 14, 2020 HISTORY: ORDERING SYSTEM PROVIDED HISTORY: right hand middle finger swelling. TECHNOLOGIST PROVIDED HISTORY: Reason for exam:->right hand middle finger swelling. Is the patient pregnant?->No Reason for Exam: pt hand swollen with 3rd phanlange and mc swelling redness pain - pt unable to hold still for test due to pain in hips legs and shoulders - moved out of postion and repositioned - unable to finish contrast due to pain best images possible FINDINGS: Image quality is degraded secondary to motion artifact. SOFT TISSUES: Subcutaneous edema throughout the dorsal soft tissues and extending throughout the soft tissues predominantly of the 3rd digit. No organized drainable subcutaneous collection is identified. Mild thickening of the 3rd flexor tendons with fluid tracking along the 3rd flexor tendon sheath at the level of the hand and proximal 3rd digit consistent with tenosynovitis. Flexor and extensor tendons are grossly intact. Evaluation limited due to motion artifact. BONE MARROW: Patchy marrow edema of the proximal 3rd phalanx which is most pronounced at the base. The T1 marrow signal appears grossly preserved. Findings may reflect reactive noninfectious osteitis, however, early changes of osteomyelitis can not be excluded. JOINTS: No significant joint effusion identified. Mild osteoarthritic changes of the MTP joints and mild-to-moderate degenerative changes of the interphalangeal joints of the hand. Mild degenerative change also present the 1st carpometacarpal joint and moderate degenerative changes are noted at the triscaphe joint. 1. Limited examination due to motion artifact. 2. Subcutaneous edema predominant involving the dorsal soft tissues of the hand and 3rd digit. Correlate for cellulitis. No organized drainable collection within limits of the exam. 3. Tenosynovitis of the 3rd flexor tendon sheath.  4. Patchy edema of the proximal 3rd phalanx with grossly preserved T1 signal. Findings may reflect reactive noninfectious osteitis, however, early changes of osteomyelitis not excluded. 5. Mild-to-moderate osteoarthritis. Xr Chest Portable    Result Date: 2/1/2020  EXAMINATION: ONE XRAY VIEW OF THE CHEST 2/1/2020 2:06 pm COMPARISON: Chest x-ray January 13, 2020 HISTORY: ORDERING SYSTEM PROVIDED HISTORY: PICC Tip Placement TECHNOLOGIST PROVIDED HISTORY: Reason for exam:->PICC Tip Placement Reason for Exam: PICC Tip Placement Acuity: Unknown FINDINGS: Cardiac silhouette appears stable. Right MediPort described on prior exam has been removed. Left-sided PICC line present with tip projecting over the expected location of the left brachiocephalic vein. No focal consolidation, pleural effusion, or pneumothorax identified. Osseous structures appear stable. 1. No acute cardiopulmonary process identified. 2. Left-sided PICC line identified with tip projecting over the expected location of the left brachiocephalic vein. Xr Chest 1 Vw    Result Date: 1/13/2020  EXAMINATION: ONE XRAY VIEW OF THE CHEST 1/13/2020 7:12 pm COMPARISON: Chest radiograph 10/31/2019. HISTORY: ORDERING SYSTEM PROVIDED HISTORY: fever TECHNOLOGIST PROVIDED HISTORY: Reason for exam:->fever Reason for Exam: fever Acuity: Acute Type of Exam: Initial Additional signs and symptoms: na Relevant Medical/Surgical History: lupus, morbid obesity, copd FINDINGS: A right chest port terminates in the mid superior vena cava. The cardiomediastinal silhouette is unchanged. No pneumothorax, vascular congestion, consolidation, or pleural effusion is identified. No acute osseous abnormality. No acute process.      Ct Lumbar Reconstruction Wo Post Process    Result Date: 2/1/2020  EXAMINATION: CT OF THE THORACIC SPINE WITHOUT CONTRAST; CT OF THE LUMBAR SPINE WITHOUT CONTRAST  2/1/2020 12:35 pm: TECHNIQUE: CT of the thoracic spine was performed without the administration of intravenous contrast. Multiplanar 1/10/2020  EXAMINATION: MRI OF THE BRAIN WITHOUT CONTRAST  1/9/2020 11:11 pm TECHNIQUE: Multiplanar multisequence MRI of the brain was performed without the administration of intravenous contrast. COMPARISON: CT head without contrast January 9, 2020. HISTORY: ORDERING SYSTEM PROVIDED HISTORY: dizziness for three days TECHNOLOGIST PROVIDED HISTORY: Reason for exam:->dizziness for three days FINDINGS: INTRACRANIAL STRUCTURES/VENTRICLES: There is no acute infarct. No mass effect or midline shift. No evidence of an acute intracranial hemorrhage. The ventricles and sulci are normal in size and configuration. The sellar/suprasellar regions appear unremarkable. The normal signal voids within the major intracranial vessels appear maintained. ORBITS: The visualized portion of the orbits demonstrate no acute abnormality. SINUSES: The visualized paranasal sinuses and mastoid air cells are well aerated. BONES/SOFT TISSUES: The bone marrow signal intensity appears normal. The soft tissues demonstrate no acute abnormality. Unremarkable noncontrast MRI brain examination. EKG     Rhythm: normal sinus   Rate: normal  Clinical Impression: no acute changes        The patient was seen and examined on day of discharge and this discharge summary is in conjunction with any daily progress note from day of discharge. Time Spent on discharge is   >35  min  in the examination, evaluation, counseling and review of medications and discharge plan.             Signed:    Kaylyn Kebede MD   2/4/2020

## 2020-02-06 ENCOUNTER — HOSPITAL ENCOUNTER (OUTPATIENT)
Age: 50
Setting detail: SPECIMEN
Discharge: HOME | End: 2020-02-06
Payer: COMMERCIAL

## 2020-02-06 LAB
CULTURE: NORMAL
CULTURE: NORMAL
DOSE AMOUNT: NORMAL
DOSE TIME: NORMAL
Lab: NORMAL
Lab: NORMAL
SPECIMEN: NORMAL
SPECIMEN: NORMAL
VANCOMYCIN TROUGH: 16.9 UG/ML (ref 10–20)

## 2020-02-06 PROCEDURE — 80202 ASSAY OF VANCOMYCIN: CPT

## 2020-02-10 ENCOUNTER — HOSPITAL ENCOUNTER (OUTPATIENT)
Dept: WOMENS IMAGING | Age: 50
Discharge: HOME | End: 2020-02-10
Payer: COMMERCIAL

## 2020-02-10 ENCOUNTER — HOSPITAL ENCOUNTER (OUTPATIENT)
Age: 50
Setting detail: SPECIMEN
Discharge: HOME | End: 2020-02-10
Payer: COMMERCIAL

## 2020-02-10 ENCOUNTER — HOSPITAL ENCOUNTER (OUTPATIENT)
Dept: ULTRASOUND IMAGING | Age: 50
Discharge: HOME | End: 2020-02-10
Payer: COMMERCIAL

## 2020-02-10 LAB
ALBUMIN SERPL-MCNC: 4 GM/DL (ref 3.4–5)
ALP BLD-CCNC: 129 IU/L (ref 40–128)
ALT SERPL-CCNC: 16 U/L (ref 10–40)
ANION GAP SERPL CALCULATED.3IONS-SCNC: 13 MMOL/L (ref 4–16)
AST SERPL-CCNC: 20 IU/L (ref 15–37)
BASOPHILS ABSOLUTE: 0.1 K/CU MM
BASOPHILS RELATIVE PERCENT: 1.4 % (ref 0–1)
BILIRUB SERPL-MCNC: 0.2 MG/DL (ref 0–1)
BUN BLDV-MCNC: 12 MG/DL (ref 6–23)
C-REACTIVE PROTEIN, HIGH SENSITIVITY: 16.3 MG/L
CALCIUM SERPL-MCNC: 9.8 MG/DL (ref 8.3–10.6)
CHLORIDE BLD-SCNC: 106 MMOL/L (ref 99–110)
CO2: 22 MMOL/L (ref 21–32)
CREAT SERPL-MCNC: 1 MG/DL (ref 0.6–1.1)
DIFFERENTIAL TYPE: ABNORMAL
DOSE AMOUNT: NORMAL
DOSE TIME: NORMAL
EOSINOPHILS ABSOLUTE: 0.3 K/CU MM
EOSINOPHILS RELATIVE PERCENT: 7.6 % (ref 0–3)
ERYTHROCYTE SEDIMENTATION RATE: 65 MM/HR (ref 0–30)
GFR AFRICAN AMERICAN: >60 ML/MIN/1.73M2
GFR NON-AFRICAN AMERICAN: 58 ML/MIN/1.73M2
GLUCOSE BLD-MCNC: 90 MG/DL (ref 70–99)
HCT VFR BLD CALC: 31.2 % (ref 37–47)
HEMOGLOBIN: 9.6 GM/DL (ref 12.5–16)
IMMATURE NEUTROPHIL %: 0.2 % (ref 0–0.43)
LYMPHOCYTES ABSOLUTE: 1.1 K/CU MM
LYMPHOCYTES RELATIVE PERCENT: 24.2 % (ref 24–44)
MCH RBC QN AUTO: 26.7 PG (ref 27–31)
MCHC RBC AUTO-ENTMCNC: 30.8 % (ref 32–36)
MCV RBC AUTO: 86.7 FL (ref 78–100)
MONOCYTES ABSOLUTE: 0.4 K/CU MM
MONOCYTES RELATIVE PERCENT: 8.1 % (ref 0–4)
NUCLEATED RBC %: 0 %
PDW BLD-RTO: 13.7 % (ref 11.7–14.9)
PLATELET # BLD: 231 K/CU MM (ref 140–440)
PMV BLD AUTO: 10.1 FL (ref 7.5–11.1)
POTASSIUM SERPL-SCNC: 4.2 MMOL/L (ref 3.5–5.1)
RBC # BLD: 3.6 M/CU MM (ref 4.2–5.4)
SEGMENTED NEUTROPHILS ABSOLUTE COUNT: 2.5 K/CU MM
SEGMENTED NEUTROPHILS RELATIVE PERCENT: 58.5 % (ref 36–66)
SODIUM BLD-SCNC: 141 MMOL/L (ref 135–145)
TOTAL IMMATURE NEUTOROPHIL: 0.01 K/CU MM
TOTAL NUCLEATED RBC: 0 K/CU MM
TOTAL PROTEIN: 6.8 GM/DL (ref 6.4–8.2)
VANCOMYCIN TROUGH: 12 UG/ML (ref 10–20)
WBC # BLD: 4.3 K/CU MM (ref 4–10.5)

## 2020-02-10 PROCEDURE — 85652 RBC SED RATE AUTOMATED: CPT

## 2020-02-10 PROCEDURE — 80053 COMPREHEN METABOLIC PANEL: CPT

## 2020-02-10 PROCEDURE — 93971 EXTREMITY STUDY: CPT

## 2020-02-10 PROCEDURE — 80202 ASSAY OF VANCOMYCIN: CPT

## 2020-02-10 PROCEDURE — 86140 C-REACTIVE PROTEIN: CPT

## 2020-02-10 PROCEDURE — 77067 SCR MAMMO BI INCL CAD: CPT

## 2020-02-10 PROCEDURE — 85025 COMPLETE CBC W/AUTO DIFF WBC: CPT

## 2020-02-12 ENCOUNTER — TELEPHONE (OUTPATIENT)
Dept: INFECTIOUS DISEASES | Age: 50
End: 2020-02-12

## 2020-02-12 NOTE — TELEPHONE ENCOUNTER
[facility] called stating that Vancomycin is scheduled to end today and they would like to know if more meds will be ordered or if PICC line should be maintained. Please advise.

## 2020-02-13 ENCOUNTER — HOSPITAL ENCOUNTER (OUTPATIENT)
Age: 50
Setting detail: SPECIMEN
Discharge: HOME | End: 2020-02-13
Payer: COMMERCIAL

## 2020-02-13 PROCEDURE — 87040 BLOOD CULTURE FOR BACTERIA: CPT

## 2020-02-13 PROCEDURE — 87150 DNA/RNA AMPLIFIED PROBE: CPT

## 2020-02-13 PROCEDURE — 87186 SC STD MICRODIL/AGAR DIL: CPT

## 2020-02-15 LAB
CULTURE: NORMAL
CULTURE: NORMAL
Lab: NORMAL
SPECIMEN: NORMAL

## 2020-02-17 ENCOUNTER — HOSPITAL ENCOUNTER (OUTPATIENT)
Age: 50
Setting detail: SPECIMEN
Discharge: HOME | End: 2020-02-17
Payer: COMMERCIAL

## 2020-02-17 LAB
ALBUMIN SERPL-MCNC: 4.2 GM/DL (ref 3.4–5)
ALP BLD-CCNC: 123 IU/L (ref 40–128)
ALT SERPL-CCNC: 15 U/L (ref 10–40)
ANION GAP SERPL CALCULATED.3IONS-SCNC: 13 MMOL/L (ref 4–16)
AST SERPL-CCNC: 19 IU/L (ref 15–37)
BASOPHILS ABSOLUTE: 0.1 K/CU MM
BASOPHILS RELATIVE PERCENT: 1.6 % (ref 0–1)
BILIRUB SERPL-MCNC: 0.3 MG/DL (ref 0–1)
BUN BLDV-MCNC: 20 MG/DL (ref 6–23)
C-REACTIVE PROTEIN, HIGH SENSITIVITY: 5.3 MG/L
CALCIUM SERPL-MCNC: 9.8 MG/DL (ref 8.3–10.6)
CHLORIDE BLD-SCNC: 106 MMOL/L (ref 99–110)
CO2: 22 MMOL/L (ref 21–32)
CREAT SERPL-MCNC: 0.9 MG/DL (ref 0.6–1.1)
DIFFERENTIAL TYPE: ABNORMAL
DOSE AMOUNT: NORMAL
DOSE TIME: NORMAL
EOSINOPHILS ABSOLUTE: 0.4 K/CU MM
EOSINOPHILS RELATIVE PERCENT: 8.6 % (ref 0–3)
ERYTHROCYTE SEDIMENTATION RATE: 41 MM/HR (ref 0–30)
GFR AFRICAN AMERICAN: >60 ML/MIN/1.73M2
GFR NON-AFRICAN AMERICAN: >60 ML/MIN/1.73M2
GLUCOSE BLD-MCNC: 122 MG/DL (ref 70–99)
HCT VFR BLD CALC: 34 % (ref 37–47)
HEMOGLOBIN: 10.4 GM/DL (ref 12.5–16)
IMMATURE NEUTROPHIL %: 0.4 % (ref 0–0.43)
LYMPHOCYTES ABSOLUTE: 1.5 K/CU MM
LYMPHOCYTES RELATIVE PERCENT: 30.2 % (ref 24–44)
MCH RBC QN AUTO: 26.3 PG (ref 27–31)
MCHC RBC AUTO-ENTMCNC: 30.6 % (ref 32–36)
MCV RBC AUTO: 86.1 FL (ref 78–100)
MONOCYTES ABSOLUTE: 0.3 K/CU MM
MONOCYTES RELATIVE PERCENT: 5.7 % (ref 0–4)
NUCLEATED RBC %: 0 %
PDW BLD-RTO: 14 % (ref 11.7–14.9)
PLATELET # BLD: 262 K/CU MM (ref 140–440)
PMV BLD AUTO: 10.1 FL (ref 7.5–11.1)
POTASSIUM SERPL-SCNC: 4.3 MMOL/L (ref 3.5–5.1)
RBC # BLD: 3.95 M/CU MM (ref 4.2–5.4)
SEGMENTED NEUTROPHILS ABSOLUTE COUNT: 2.7 K/CU MM
SEGMENTED NEUTROPHILS RELATIVE PERCENT: 53.5 % (ref 36–66)
SODIUM BLD-SCNC: 141 MMOL/L (ref 135–145)
TOTAL IMMATURE NEUTOROPHIL: 0.02 K/CU MM
TOTAL NUCLEATED RBC: 0 K/CU MM
TOTAL PROTEIN: 6.9 GM/DL (ref 6.4–8.2)
VANCOMYCIN TROUGH: 18.8 UG/ML (ref 10–20)
WBC # BLD: 5.1 K/CU MM (ref 4–10.5)

## 2020-02-17 PROCEDURE — 85025 COMPLETE CBC W/AUTO DIFF WBC: CPT

## 2020-02-17 PROCEDURE — 80202 ASSAY OF VANCOMYCIN: CPT

## 2020-02-17 PROCEDURE — 80053 COMPREHEN METABOLIC PANEL: CPT

## 2020-02-17 PROCEDURE — 85652 RBC SED RATE AUTOMATED: CPT

## 2020-02-17 PROCEDURE — 86140 C-REACTIVE PROTEIN: CPT

## 2020-02-20 ENCOUNTER — HOSPITAL ENCOUNTER (OUTPATIENT)
Age: 50
Setting detail: SPECIMEN
Discharge: HOME | End: 2020-02-20
Payer: COMMERCIAL

## 2020-02-20 LAB
DOSE AMOUNT: NORMAL
DOSE TIME: NORMAL
VANCOMYCIN TROUGH: 19.6 UG/ML (ref 10–20)

## 2020-02-20 PROCEDURE — 80202 ASSAY OF VANCOMYCIN: CPT

## 2020-02-26 ENCOUNTER — OFFICE VISIT (OUTPATIENT)
Dept: INFECTIOUS DISEASES | Age: 50
End: 2020-02-26
Payer: COMMERCIAL

## 2020-02-26 VITALS
BODY MASS INDEX: 44.8 KG/M2 | OXYGEN SATURATION: 98 % | HEART RATE: 57 BPM | DIASTOLIC BLOOD PRESSURE: 71 MMHG | SYSTOLIC BLOOD PRESSURE: 120 MMHG | WEIGHT: 261 LBS

## 2020-02-26 PROCEDURE — 1036F TOBACCO NON-USER: CPT | Performed by: INTERNAL MEDICINE

## 2020-02-26 PROCEDURE — 3017F COLORECTAL CA SCREEN DOC REV: CPT | Performed by: INTERNAL MEDICINE

## 2020-02-26 PROCEDURE — G8482 FLU IMMUNIZE ORDER/ADMIN: HCPCS | Performed by: INTERNAL MEDICINE

## 2020-02-26 PROCEDURE — G8427 DOCREV CUR MEDS BY ELIG CLIN: HCPCS | Performed by: INTERNAL MEDICINE

## 2020-02-26 PROCEDURE — G8417 CALC BMI ABV UP PARAM F/U: HCPCS | Performed by: INTERNAL MEDICINE

## 2020-02-26 PROCEDURE — 99214 OFFICE O/P EST MOD 30 MIN: CPT | Performed by: INTERNAL MEDICINE

## 2020-02-26 RX ORDER — PROMETHAZINE HYDROCHLORIDE 25 MG/1
25 TABLET ORAL EVERY 6 HOURS PRN
Qty: 30 TABLET | Refills: 2 | Status: SHIPPED | OUTPATIENT
Start: 2020-02-26 | End: 2020-03-06

## 2020-02-26 NOTE — PROGRESS NOTES
3/1/2020         Referring Physician: No ref. provider found  Primary Care Physician: Neftali Syed MD    Impression/Plan:   Diagnosis Orders   1. Bacteremia due to methicillin resistant Staphylococcus aureus  Remove central line       Discussion:  Bacteremia due to methicillin resistant Staphylococcus aureus  MediPort MRSA infection. Line removed, completed 4 weeks of vancomycin. Remove the central line. Return if symptoms worsen or fail to improve. History: Daniel Sofia is a 44 y.o. obese  female treated in the hospital for MSSA left 1st metatarsal chronic osteomyelitis, s/p excision of sinus and the resection of intramedullary bone abscess utilizing curettage, power lavage and irrigation then packing with OsteoSet beads and vancomycin powder on 6/16/19. Planned completion of cefazolin on 7/28/19 was interrupted by hospital admission for P aeruginosa CRBSI: left ACW mediport. Mediport was removed and she was treated with a 2 week course of cefepime. Completed oral abx-cefuroxime on 10/12/19 (cumulative 12 week course). Reports getting the x-ray of the right foot done (but I don't see it). Complains of non-pruritic nodular lesions appearing on her face, neck and back. States she has a hx of lupus but does not see a rheumatologist.  On vancomycin originally scheduled to end on 2/12 (4 weeks) but extended by 2 weeks. CRP is down     Review of Systems   Skin:            All other systems reviewed and are negative.       Allergies   Allergen Reactions    Aspirin Palpitations     \"My Heart Rate Elevates\"    Shellfish-Derived Products Swelling    Toradol [Ketorolac Tromethamine] Rash    Reglan [Metoclopramide] Itching       Patient Active Problem List   Diagnosis    Rectal bleeding    Fever    Upper GI bleed    Fibromyalgia    Lupus (systemic lupus erythematosus) (HCC)    Nausea & vomiting    Chest pain    Chronic pancreatitis (HCC)    HTN (hypertension)    Acute pancreatitis  Right-sided chest pain    Super obesity    Absolute anemia    Hypokalemia    Generalized abdominal pain    Pneumonia of both lungs due to infectious organism    Foot ulcer, left (HCC)    SLE (systemic lupus erythematosus related syndrome) (HCC)    Hypoxia    Cellulitis    Pancytopenia (HCC)    Pleurisy    Frequent UTI    Gastroesophageal reflux disease without esophagitis    MRSA cellulitis of left foot    Depression    Fatty liver    Fatty liver disease, nonalcoholic    Arthritis    Bilateral low back pain with left-sided sciatica    Morbid obesity with BMI of 50.0-59.9, adult (HCC)    Intractable vomiting with nausea    Class 3 obesity in adult    Hiatal hernia    Poor intravenous access    Post-operative nausea and vomiting    Status post bariatric surgery    Status post laparoscopic sleeve gastrectomy    Abscess    Dysphagia    Pseudoseizure    Chronic pain syndrome    Drug-seeking/Aberrant behavior    Acute conjunctivitis    Chronic osteomyelitis of left foot with draining sinus (HCC)    Receiving intravenous antibiotic treatment as outpatient    History of bacteremia    Abdominal pain, right upper quadrant    Sepsis due to Pseudomonas species ([facility] [address]    Bacteremia due to Pseudomonas    Catheter-related bloodstream infection    Hematemesis with nausea    Hematemesis    Lymph node disorder    Dizziness    Abdominal pain, epigastric    Intractable vomiting    Bacteremia due to methicillin resistant Staphylococcus aureus       Current Outpatient Medications   Medication Sig Dispense Refill    pantoprazole (PROTONIX) 40 MG tablet Take 1 tablet by mouth every morning (before breakfast) 90 tablet 1    levETIRAcetam (KEPPRA) 1000 MG tablet Take 1 tablet by mouth 2 times daily 60 tablet 3    oxyCODONE-acetaminophen (PERCOCET) 5-325 MG per tablet Take 1 tablet by mouth every 8 hours as needed for Pain.        Nutritional Supplements (ENSURE HIGH PROTEIN) LIQD Take 1 APPENDECTOMY  02/1998    Done When Tubes And Ovaries Were Removed    CARDIAC CATHETERIZATION  10/24/2010    CATHETER REMOVAL N/A 7/16/2019    PORT REMOVAL performed by Brayden Diaz MD at [address] [place] [address]  08/27/1991    CHOLECYSTECTOMY, LAPAROSCOPIC  1990's    COLONOSCOPY  Last Done In 2000's    DENTAL SURGERY      Teeth Extracted In Past    ENDOSCOPY, COLON, DIAGNOSTIC  Last Done In 2018    FOOT DEBRIDEMENT Left 6/16/2019    FIRST METATARSAL DEBRIDEMENT INCISION AND DRAINAGE. EXCISION OF ULCER.  RESECTION OF BONE. 1ST METATARSAL POWER LAVAGE AND BONE CEMENT performed by Mike Huang DPM at [address] [place] [address] Left Last Done In 2016     Dr. Marco Rosenberg, \" About 6 Surgeries Done Because Of Staph Infection\"    HIATAL HERNIA REPAIR N/A 2/12/2019    HERNIA HIATAL LAPAROSCOPIC ROBOTIC performed by Brayden Diaz MD at [facility]  10/1997    Partial Abdominal Hysterectomy    INSERTION / REMOVAL / REPLACEMENT VENOUS ACCESS CATHETER N/A 8/15/2019    PORT INSERTION performed by Brayden Diaz MD at [address] [place] [address]    removed after 6 months    LUNG REMOVAL, PARTIAL Left 2008    Benign    OTHER SURGICAL HISTORY  02/1998    \"Tubes And Ovaries Removed, Appendectomy Also Done\"    OTHER SURGICAL HISTORY  Last Done 7-15-16    Mediport Insertion \"Total Of Six Done, Removed Last Mediport In 2014\"    PORT SURGERY N/A 1/15/2020    PORT REMOVAL performed by Brayden Diaz MD at [facility] N/A 2/12/2019    GASTRECTOMY SLEEVE LAPAROSCOPIC ROBOTIC performed by Brayden Diaz MD at [address] [place] [address]  08/27/2018    UPPER GASTROINTESTINAL ENDOSCOPY N/A 4/2/2019    EGD CONTROL HEMORRHAGE with epi injection at bleeding site performed by Brayden Diaz MD at [address] [facility] 6/19/2019    EGD DIAGNOSTIC ONLY performed by Brayden Diaz MD at [facility] ENDOSCOPY    UPPER GASTROINTESTINAL ENDOSCOPY N/A 7/22/2019    EGD DIAGNOSTIC ONLY performed by Candy Blackwell MD at [facility] N/A 10/14/2019    EGD DIAGNOSTIC ONLY performed by Tomi Strickland MD at [facility] ENDOSCOPY       Social History     Socioeconomic History    Marital status:      Spouse name: Not on file    Number of children: Not on file    Years of education: Not on file    Highest education level: Not on file   Occupational History    Not on file   Social Needs    Financial resource strain: Not on file    Food insecurity:     Worry: Not on file     Inability: Not on file    Transportation needs:     Medical: Not on file     Non-medical: Not on file   Tobacco Use    Smoking status: Never Smoker    Smokeless tobacco: Never Used   Substance and Sexual Activity    Alcohol use: No     Alcohol/week: 0.0 standard drinks    Drug use: No    Sexual activity: Not Currently   Lifestyle    Physical activity:     Days per week: Not on file     Minutes per session: Not on file    Stress: Not on file   Relationships    Social connections:     Talks on phone: Not on file     Gets together: Not on file     Attends Restorationism service: Not on file     Active member of club or organization: Not on file     Attends meetings of clubs or organizations: Not on file     Relationship status: Not on file    Intimate partner violence:     Fear of current or ex partner: Not on file     Emotionally abused: Not on file     Physically abused: Not on file     Forced sexual activity: Not on file   Other Topics Concern    Not on file   Social History Narrative    Not on file       Family History   Problem Relation Age of Onset    Diabetes Mother         \"Borderline Diabetes\"    High Blood Pressure Mother     Obesity Mother     Arthritis Mother     Heart Disease Mother     High Cholesterol Mother     Vision Loss Mother     Diabetes Father     High Blood Pressure 5 DAYS  Final       Imaging Studies:   none

## 2020-03-01 ENCOUNTER — HOSPITAL ENCOUNTER (EMERGENCY)
Age: 50
Discharge: HOME | End: 2020-03-01
Attending: EMERGENCY MEDICINE
Payer: COMMERCIAL

## 2020-03-01 ENCOUNTER — APPOINTMENT (OUTPATIENT)
Dept: GENERAL RADIOLOGY | Age: 50
End: 2020-03-01
Payer: COMMERCIAL

## 2020-03-01 VITALS
WEIGHT: 261 LBS | OXYGEN SATURATION: 97 % | TEMPERATURE: 97.9 F | SYSTOLIC BLOOD PRESSURE: 101 MMHG | BODY MASS INDEX: 44.56 KG/M2 | RESPIRATION RATE: 16 BRPM | DIASTOLIC BLOOD PRESSURE: 87 MMHG | HEIGHT: 64 IN | HEART RATE: 68 BPM

## 2020-03-01 LAB
ALBUMIN SERPL-MCNC: 4.1 GM/DL (ref 3.4–5)
ALP BLD-CCNC: 117 IU/L (ref 40–128)
ALT SERPL-CCNC: 18 U/L (ref 10–40)
ANION GAP SERPL CALCULATED.3IONS-SCNC: 10 MMOL/L (ref 4–16)
AST SERPL-CCNC: 24 IU/L (ref 15–37)
BACTERIA: NEGATIVE /HPF
BASOPHILS ABSOLUTE: 0.1 K/CU MM
BASOPHILS RELATIVE PERCENT: 1.1 % (ref 0–1)
BILIRUB SERPL-MCNC: 0.2 MG/DL (ref 0–1)
BILIRUBIN URINE: NEGATIVE MG/DL
BLOOD, URINE: NEGATIVE
BUN BLDV-MCNC: 21 MG/DL (ref 6–23)
CALCIUM SERPL-MCNC: 10.2 MG/DL (ref 8.3–10.6)
CHLORIDE BLD-SCNC: 105 MMOL/L (ref 99–110)
CLARITY: ABNORMAL
CO2: 22 MMOL/L (ref 21–32)
COLOR: YELLOW
CREAT SERPL-MCNC: 0.8 MG/DL (ref 0.6–1.1)
DIFFERENTIAL TYPE: ABNORMAL
EOSINOPHILS ABSOLUTE: 0.4 K/CU MM
EOSINOPHILS RELATIVE PERCENT: 9.8 % (ref 0–3)
GFR AFRICAN AMERICAN: >60 ML/MIN/1.73M2
GFR NON-AFRICAN AMERICAN: >60 ML/MIN/1.73M2
GLUCOSE BLD-MCNC: 104 MG/DL (ref 70–99)
GLUCOSE, URINE: NEGATIVE MG/DL
HCT VFR BLD CALC: 31.4 % (ref 37–47)
HEMOGLOBIN: 9.8 GM/DL (ref 12.5–16)
IMMATURE NEUTROPHIL %: 0.2 % (ref 0–0.43)
KETONES, URINE: NEGATIVE MG/DL
LACTIC ACID, SEPSIS: 1 MMOL/L (ref 0.5–1.9)
LEUKOCYTE ESTERASE, URINE: ABNORMAL
LIPASE: 18 IU/L (ref 13–60)
LYMPHOCYTES ABSOLUTE: 2 K/CU MM
LYMPHOCYTES RELATIVE PERCENT: 44.5 % (ref 24–44)
MCH RBC QN AUTO: 27.1 PG (ref 27–31)
MCHC RBC AUTO-ENTMCNC: 31.2 % (ref 32–36)
MCV RBC AUTO: 87 FL (ref 78–100)
MONOCYTES ABSOLUTE: 0.3 K/CU MM
MONOCYTES RELATIVE PERCENT: 7.6 % (ref 0–4)
MUCUS: ABNORMAL HPF
NITRITE URINE, QUANTITATIVE: NEGATIVE
NUCLEATED RBC %: 0 %
PDW BLD-RTO: 13.8 % (ref 11.7–14.9)
PH, URINE: 5 (ref 5–8)
PLATELET # BLD: 207 K/CU MM (ref 140–440)
PMV BLD AUTO: 10.5 FL (ref 7.5–11.1)
POTASSIUM SERPL-SCNC: 4.4 MMOL/L (ref 3.5–5.1)
PROTEIN UA: NEGATIVE MG/DL
RBC # BLD: 3.61 M/CU MM (ref 4.2–5.4)
RBC URINE: <1 /HPF (ref 0–6)
SEGMENTED NEUTROPHILS ABSOLUTE COUNT: 1.7 K/CU MM
SEGMENTED NEUTROPHILS RELATIVE PERCENT: 36.8 % (ref 36–66)
SODIUM BLD-SCNC: 137 MMOL/L (ref 135–145)
SPECIFIC GRAVITY UA: 1.03 (ref 1–1.03)
SQUAMOUS EPITHELIAL: 3 /HPF
TOTAL IMMATURE NEUTOROPHIL: 0.01 K/CU MM
TOTAL NUCLEATED RBC: 0 K/CU MM
TOTAL PROTEIN: 6.9 GM/DL (ref 6.4–8.2)
TRICHOMONAS: ABNORMAL /HPF
TROPONIN T: <0.01 NG/ML
UROBILINOGEN, URINE: 1 MG/DL (ref 0.2–1)
WBC # BLD: 4.5 K/CU MM (ref 4–10.5)
WBC UA: 1 /HPF (ref 0–5)

## 2020-03-01 PROCEDURE — 83690 ASSAY OF LIPASE: CPT

## 2020-03-01 PROCEDURE — 96375 TX/PRO/DX INJ NEW DRUG ADDON: CPT

## 2020-03-01 PROCEDURE — 2580000003 HC RX 258: Performed by: EMERGENCY MEDICINE

## 2020-03-01 PROCEDURE — 87086 URINE CULTURE/COLONY COUNT: CPT

## 2020-03-01 PROCEDURE — 99284 EMERGENCY DEPT VISIT MOD MDM: CPT

## 2020-03-01 PROCEDURE — 87040 BLOOD CULTURE FOR BACTERIA: CPT

## 2020-03-01 PROCEDURE — 93005 ELECTROCARDIOGRAM TRACING: CPT | Performed by: EMERGENCY MEDICINE

## 2020-03-01 PROCEDURE — 6370000000 HC RX 637 (ALT 250 FOR IP): Performed by: EMERGENCY MEDICINE

## 2020-03-01 PROCEDURE — 80053 COMPREHEN METABOLIC PANEL: CPT

## 2020-03-01 PROCEDURE — 85025 COMPLETE CBC W/AUTO DIFF WBC: CPT

## 2020-03-01 PROCEDURE — 96376 TX/PRO/DX INJ SAME DRUG ADON: CPT

## 2020-03-01 PROCEDURE — 71046 X-RAY EXAM CHEST 2 VIEWS: CPT

## 2020-03-01 PROCEDURE — 6360000002 HC RX W HCPCS: Performed by: EMERGENCY MEDICINE

## 2020-03-01 PROCEDURE — 93010 ELECTROCARDIOGRAM REPORT: CPT | Performed by: INTERNAL MEDICINE

## 2020-03-01 PROCEDURE — 96374 THER/PROPH/DIAG INJ IV PUSH: CPT

## 2020-03-01 PROCEDURE — 81001 URINALYSIS AUTO W/SCOPE: CPT

## 2020-03-01 PROCEDURE — 83605 ASSAY OF LACTIC ACID: CPT

## 2020-03-01 PROCEDURE — 84484 ASSAY OF TROPONIN QUANT: CPT

## 2020-03-01 RX ORDER — ONDANSETRON 2 MG/ML
4 INJECTION INTRAMUSCULAR; INTRAVENOUS EVERY 30 MIN PRN
Status: DISCONTINUED | OUTPATIENT
Start: 2020-03-01 | End: 2020-03-01 | Stop reason: HOSPADM

## 2020-03-01 RX ORDER — PROMETHAZINE HYDROCHLORIDE 25 MG/1
25 TABLET ORAL 3 TIMES DAILY PRN
Qty: 12 TABLET | Refills: 0 | Status: SHIPPED | OUTPATIENT
Start: 2020-03-01 | End: 2020-03-06

## 2020-03-01 RX ORDER — MORPHINE SULFATE 4 MG/ML
4 INJECTION, SOLUTION INTRAMUSCULAR; INTRAVENOUS ONCE
Status: COMPLETED | OUTPATIENT
Start: 2020-03-01 | End: 2020-03-01

## 2020-03-01 RX ORDER — 0.9 % SODIUM CHLORIDE 0.9 %
1000 INTRAVENOUS SOLUTION INTRAVENOUS ONCE
Status: COMPLETED | OUTPATIENT
Start: 2020-03-01 | End: 2020-03-01

## 2020-03-01 RX ORDER — MORPHINE SULFATE 4 MG/ML
4 INJECTION, SOLUTION INTRAMUSCULAR; INTRAVENOUS EVERY 30 MIN PRN
Status: DISCONTINUED | OUTPATIENT
Start: 2020-03-01 | End: 2020-03-01 | Stop reason: HOSPADM

## 2020-03-01 RX ORDER — PROMETHAZINE HYDROCHLORIDE 25 MG/1
25 TABLET ORAL ONCE
Status: COMPLETED | OUTPATIENT
Start: 2020-03-01 | End: 2020-03-01

## 2020-03-01 RX ORDER — SODIUM CHLORIDE 9 MG/ML
INJECTION, SOLUTION INTRAVENOUS CONTINUOUS
Status: DISCONTINUED | OUTPATIENT
Start: 2020-03-01 | End: 2020-03-01 | Stop reason: HOSPADM

## 2020-03-01 RX ADMIN — MORPHINE SULFATE 4 MG: 4 INJECTION INTRAVENOUS at 10:28

## 2020-03-01 RX ADMIN — MORPHINE SULFATE 4 MG: 4 INJECTION INTRAVENOUS at 07:18

## 2020-03-01 RX ADMIN — MORPHINE SULFATE 4 MG: 4 INJECTION INTRAVENOUS at 09:24

## 2020-03-01 RX ADMIN — SODIUM CHLORIDE 1000 ML: 9 INJECTION, SOLUTION INTRAVENOUS at 07:18

## 2020-03-01 RX ADMIN — ONDANSETRON 4 MG: 2 INJECTION INTRAMUSCULAR; INTRAVENOUS at 09:24

## 2020-03-01 RX ADMIN — PROMETHAZINE HYDROCHLORIDE 25 MG: 25 TABLET ORAL at 09:55

## 2020-03-01 RX ADMIN — ONDANSETRON 4 MG: 2 INJECTION INTRAMUSCULAR; INTRAVENOUS at 07:18

## 2020-03-01 RX ADMIN — SODIUM CHLORIDE: 9 INJECTION, SOLUTION INTRAVENOUS at 07:18

## 2020-03-01 RX ADMIN — MORPHINE SULFATE 4 MG: 4 INJECTION INTRAVENOUS at 08:05

## 2020-03-01 ASSESSMENT — PAIN DESCRIPTION - LOCATION: LOCATION: ABDOMEN

## 2020-03-01 ASSESSMENT — PAIN DESCRIPTION - PAIN TYPE: TYPE: ACUTE PAIN

## 2020-03-01 ASSESSMENT — PAIN SCALES - GENERAL
PAINLEVEL_OUTOF10: 6
PAINLEVEL_OUTOF10: 7
PAINLEVEL_OUTOF10: 6

## 2020-03-01 NOTE — ED NOTES
Pt given iv morphine, to be discharged 1130. Pt is aware and states understanding, will call her mom for a ride home closer to DC time.       [address], RN  03/01/20 6430

## 2020-03-01 NOTE — CARE COORDINATION
CM review of pt chart for readmission risk, last admission 2/1-4/20, chronic abdominal issues, N&V. Pt discharged home , O/P follow-up. Pt returns to ER today with c/o N&V. Pt has hx of Lupus, recurrent infections and follows with Dr. Diana Wilder. She has a most recent history of MRSA, possibly from a Mediport infection, and just completed a 4 week course of vancomycin via PICC line 7 days ago. PICC line is still in place. Dr Devon Young documented, Patient states she started having vomiting without diarrhea yesterday. She complains of right upper abdominal discomfort and sensation of bloating. No urinary symptoms or back pain although she states the right upper quadrant pain does radiate around into her back. No urinary symptoms. Reports a Tmax of 100 at home. Per note from Dr. Elda Rhodes, patient has a significant abdominal history including morbid obesity, status post sleeve  gastrectomy, lupus, gastroesophageal reflux disease, peptic ulcer disease, hypothyroidism, history of gallstones, status post cholecystectomy complicated by common bile duct injury for which the patient has had 7 or 8 ERCPs done with  biliary and pancreatic stents placed and also sphincter of Oddi dysfunction for which the patient has had an extensive workup done . Has had recurrent episodes of upper abdominal pain with multiple EGDs performed in the past.  Patient states her symptoms today feel like prior episodes of pancreatitis.   All results negative for acute criteria requiring admission, pt treated in ER and discharged home to follow up O/P. JACKIE MENDEZ/DERICK

## 2020-03-01 NOTE — ED PROVIDER NOTES
mood.     EKG (if obtained): (All EKG's are interpreted by myself in the absence of a cardiologist)  Bradycardia 58. Left axis with good R wave progression. No ST elevation or depression. Somewhat flattened T waves in the precordial leads that appear changed from prior tracing. Otherwise no ectopy. ED Course and MDM:  Patient is ordered to have pain medication and antiemetics as well as IV fluids. Labs ordered as well after review of her history. Endorsed to Dr. Jess Chaves pending lab results and repeat assessment. Final Impression:  1. Nausea and vomiting, intractability of vomiting not specified, unspecified vomiting type    2. Abdominal pain, right upper quadrant      DISPOSITION        Patient referred to: No follow-up provider specified.   Discharge medications:  New Prescriptions    No medications on file     (Please note that portions of this note may have been completed with a voice recognition program. Efforts were made to edit the dictations but occasionally words are mis-transcribed.)    Caleb Machado,   03/01/20 0506

## 2020-03-01 NOTE — ED NOTES
Pt complains of continued abd pain 6/10, requesting more pain medication. Pt also requests ice chips, ok by Dr Emmanuel Mtz and ice chips given. Pt denies further needs at this time, call light in reach.       [place] RN  03/01/20 6848

## 2020-03-01 NOTE — ED NOTES
Pt states has been vomiting and having ABD pain since yesterday. Reports has been getting ATB via PICC line to L upper arm with last dose being approx 1 week ago. States was being treated for MRSA with source being her port which was removed.        Jamey Israel RN  03/01/20 6660

## 2020-03-02 LAB
CULTURE: ABNORMAL
Lab: ABNORMAL
SPECIMEN: ABNORMAL
TOTAL COLONY COUNT: ABNORMAL

## 2020-03-04 LAB
EKG ATRIAL RATE: 58 BPM
EKG DIAGNOSIS: NORMAL
EKG P AXIS: 33 DEGREES
EKG P-R INTERVAL: 188 MS
EKG Q-T INTERVAL: 428 MS
EKG QRS DURATION: 86 MS
EKG QTC CALCULATION (BAZETT): 420 MS
EKG R AXIS: -14 DEGREES
EKG T AXIS: 12 DEGREES
EKG VENTRICULAR RATE: 58 BPM

## 2020-03-06 ENCOUNTER — HOSPITAL ENCOUNTER (EMERGENCY)
Age: 50
Discharge: HOME | End: 2020-03-06
Payer: COMMERCIAL

## 2020-03-06 ENCOUNTER — APPOINTMENT (OUTPATIENT)
Dept: CT IMAGING | Age: 50
End: 2020-03-06
Payer: COMMERCIAL

## 2020-03-06 VITALS
RESPIRATION RATE: 15 BRPM | BODY MASS INDEX: 44.56 KG/M2 | TEMPERATURE: 98.7 F | DIASTOLIC BLOOD PRESSURE: 82 MMHG | SYSTOLIC BLOOD PRESSURE: 145 MMHG | OXYGEN SATURATION: 96 % | WEIGHT: 261 LBS | HEART RATE: 88 BPM | HEIGHT: 64 IN

## 2020-03-06 LAB
ALBUMIN SERPL-MCNC: 3.6 GM/DL (ref 3.4–5)
ALP BLD-CCNC: 110 IU/L (ref 40–129)
ALT SERPL-CCNC: 17 U/L (ref 10–40)
ANION GAP SERPL CALCULATED.3IONS-SCNC: 9 MMOL/L (ref 4–16)
AST SERPL-CCNC: 18 IU/L (ref 15–37)
BACTERIA: NEGATIVE /HPF
BASOPHILS ABSOLUTE: 0 K/CU MM
BASOPHILS RELATIVE PERCENT: 0.6 % (ref 0–1)
BILIRUB SERPL-MCNC: 0.1 MG/DL (ref 0–1)
BILIRUBIN URINE: NEGATIVE MG/DL
BLOOD, URINE: NEGATIVE
BUN BLDV-MCNC: 14 MG/DL (ref 6–23)
CALCIUM SERPL-MCNC: 9.7 MG/DL (ref 8.3–10.6)
CHLORIDE BLD-SCNC: 106 MMOL/L (ref 99–110)
CLARITY: CLEAR
CO2: 22 MMOL/L (ref 21–32)
COLOR: YELLOW
CREAT SERPL-MCNC: 0.7 MG/DL (ref 0.6–1.1)
CULTURE: NORMAL
DIFFERENTIAL TYPE: ABNORMAL
EOSINOPHILS ABSOLUTE: 0.5 K/CU MM
EOSINOPHILS RELATIVE PERCENT: 9.7 % (ref 0–3)
GFR AFRICAN AMERICAN: >60 ML/MIN/1.73M2
GFR NON-AFRICAN AMERICAN: >60 ML/MIN/1.73M2
GLUCOSE BLD-MCNC: 98 MG/DL (ref 70–99)
GLUCOSE, URINE: NEGATIVE MG/DL
HCT VFR BLD CALC: 32.8 % (ref 37–47)
HEMOGLOBIN: 9.9 GM/DL (ref 12.5–16)
HYALINE CASTS: 0 /LPF
IMMATURE NEUTROPHIL %: 0 % (ref 0–0.43)
KETONES, URINE: NEGATIVE MG/DL
LACTATE: 1.3 MMOL/L (ref 0.4–2)
LEUKOCYTE ESTERASE, URINE: ABNORMAL
LIPASE: 11 IU/L (ref 13–60)
LYMPHOCYTES ABSOLUTE: 2.1 K/CU MM
LYMPHOCYTES RELATIVE PERCENT: 44.8 % (ref 24–44)
Lab: NORMAL
MCH RBC QN AUTO: 27 PG (ref 27–31)
MCHC RBC AUTO-ENTMCNC: 30.2 % (ref 32–36)
MCV RBC AUTO: 89.6 FL (ref 78–100)
MONOCYTES ABSOLUTE: 0.3 K/CU MM
MONOCYTES RELATIVE PERCENT: 7.3 % (ref 0–4)
MUCUS: ABNORMAL HPF
NITRITE URINE, QUANTITATIVE: NEGATIVE
NUCLEATED RBC %: 0 %
PDW BLD-RTO: 13.7 % (ref 11.7–14.9)
PH, URINE: 6 (ref 5–8)
PLATELET # BLD: 201 K/CU MM (ref 140–440)
PMV BLD AUTO: 10.1 FL (ref 7.5–11.1)
POTASSIUM SERPL-SCNC: 4.1 MMOL/L (ref 3.5–5.1)
PROTEIN UA: 30 MG/DL
RBC # BLD: 3.66 M/CU MM (ref 4.2–5.4)
RBC URINE: ABNORMAL /HPF (ref 0–6)
SEGMENTED NEUTROPHILS ABSOLUTE COUNT: 1.8 K/CU MM
SEGMENTED NEUTROPHILS RELATIVE PERCENT: 37.6 % (ref 36–66)
SODIUM BLD-SCNC: 137 MMOL/L (ref 135–145)
SPECIFIC GRAVITY UA: 1.02 (ref 1–1.03)
SPECIMEN: NORMAL
SQUAMOUS EPITHELIAL: 2 /HPF
TOTAL IMMATURE NEUTOROPHIL: 0 K/CU MM
TOTAL NUCLEATED RBC: 0 K/CU MM
TOTAL PROTEIN: 6.6 GM/DL (ref 6.4–8.2)
TRICHOMONAS: ABNORMAL /HPF
UROBILINOGEN, URINE: NORMAL MG/DL (ref 0.2–1)
WBC # BLD: 4.7 K/CU MM (ref 4–10.5)
WBC UA: 2 /HPF (ref 0–5)

## 2020-03-06 PROCEDURE — 83690 ASSAY OF LIPASE: CPT

## 2020-03-06 PROCEDURE — 6360000002 HC RX W HCPCS: Performed by: PHYSICIAN ASSISTANT

## 2020-03-06 PROCEDURE — 6360000004 HC RX CONTRAST MEDICATION: Performed by: PHYSICIAN ASSISTANT

## 2020-03-06 PROCEDURE — 81001 URINALYSIS AUTO W/SCOPE: CPT

## 2020-03-06 PROCEDURE — 2580000003 HC RX 258: Performed by: PHYSICIAN ASSISTANT

## 2020-03-06 PROCEDURE — 83605 ASSAY OF LACTIC ACID: CPT

## 2020-03-06 PROCEDURE — 96376 TX/PRO/DX INJ SAME DRUG ADON: CPT

## 2020-03-06 PROCEDURE — 85025 COMPLETE CBC W/AUTO DIFF WBC: CPT

## 2020-03-06 PROCEDURE — 99284 EMERGENCY DEPT VISIT MOD MDM: CPT

## 2020-03-06 PROCEDURE — 96375 TX/PRO/DX INJ NEW DRUG ADDON: CPT

## 2020-03-06 PROCEDURE — 96374 THER/PROPH/DIAG INJ IV PUSH: CPT

## 2020-03-06 PROCEDURE — 74177 CT ABD & PELVIS W/CONTRAST: CPT

## 2020-03-06 PROCEDURE — 80053 COMPREHEN METABOLIC PANEL: CPT

## 2020-03-06 RX ORDER — PROMETHAZINE HYDROCHLORIDE 25 MG/ML
25 INJECTION, SOLUTION INTRAMUSCULAR; INTRAVENOUS ONCE
Status: COMPLETED | OUTPATIENT
Start: 2020-03-06 | End: 2020-03-06

## 2020-03-06 RX ORDER — ONDANSETRON 2 MG/ML
4 INJECTION INTRAMUSCULAR; INTRAVENOUS ONCE
Status: COMPLETED | OUTPATIENT
Start: 2020-03-06 | End: 2020-03-06

## 2020-03-06 RX ORDER — PROMETHAZINE HYDROCHLORIDE 25 MG/1
25 TABLET ORAL EVERY 6 HOURS PRN
Qty: 12 TABLET | Refills: 0 | Status: SHIPPED | OUTPATIENT
Start: 2020-03-06 | End: 2020-03-25

## 2020-03-06 RX ORDER — SODIUM CHLORIDE 0.9 % (FLUSH) 0.9 %
10 SYRINGE (ML) INJECTION 2 TIMES DAILY
Status: DISCONTINUED | OUTPATIENT
Start: 2020-03-06 | End: 2020-03-06 | Stop reason: HOSPADM

## 2020-03-06 RX ORDER — 0.9 % SODIUM CHLORIDE 0.9 %
1000 INTRAVENOUS SOLUTION INTRAVENOUS ONCE
Status: COMPLETED | OUTPATIENT
Start: 2020-03-06 | End: 2020-03-06

## 2020-03-06 RX ORDER — MORPHINE SULFATE 4 MG/ML
4 INJECTION, SOLUTION INTRAMUSCULAR; INTRAVENOUS EVERY 30 MIN PRN
Status: DISCONTINUED | OUTPATIENT
Start: 2020-03-06 | End: 2020-03-06 | Stop reason: HOSPADM

## 2020-03-06 RX ADMIN — SODIUM CHLORIDE 1000 ML: 9 INJECTION, SOLUTION INTRAVENOUS at 05:23

## 2020-03-06 RX ADMIN — IOPAMIDOL 80 ML: 755 INJECTION, SOLUTION INTRAVENOUS at 06:35

## 2020-03-06 RX ADMIN — MORPHINE SULFATE 4 MG: 4 INJECTION, SOLUTION INTRAMUSCULAR; INTRAVENOUS at 05:24

## 2020-03-06 RX ADMIN — MORPHINE SULFATE 4 MG: 4 INJECTION, SOLUTION INTRAMUSCULAR; INTRAVENOUS at 06:38

## 2020-03-06 RX ADMIN — ONDANSETRON 4 MG: 2 INJECTION INTRAMUSCULAR; INTRAVENOUS at 05:51

## 2020-03-06 RX ADMIN — PROMETHAZINE HYDROCHLORIDE 25 MG: 25 INJECTION INTRAMUSCULAR; INTRAVENOUS at 05:24

## 2020-03-06 ASSESSMENT — PAIN SCALES - GENERAL
PAINLEVEL_OUTOF10: 7
PAINLEVEL_OUTOF10: 7
PAINLEVEL_OUTOF10: 10

## 2020-03-06 ASSESSMENT — PAIN DESCRIPTION - LOCATION: LOCATION: ABDOMEN

## 2020-03-06 ASSESSMENT — PAIN DESCRIPTION - PAIN TYPE: TYPE: ACUTE PAIN

## 2020-03-06 NOTE — ED PROVIDER NOTES
5. 4 M/CU MM    Hemoglobin 9.9 (L) 12.5 - 16.0 GM/DL    Hematocrit 32.8 (L) 37 - 47 %    MCV 89.6 78 - 100 FL    MCH 27.0 27 - 31 PG    MCHC 30.2 (L) 32.0 - 36.0 %    RDW 13.7 11.7 - 14.9 %    Platelets 437 976 - 616 K/CU MM    MPV 10.1 7.5 - 11.1 FL    Differential Type AUTOMATED DIFFERENTIAL     Segs Relative 37.6 36 - 66 %    Lymphocytes % 44.8 (H) 24 - 44 %    Monocytes % 7.3 (H) 0 - 4 %    Eosinophils % 9.7 (H) 0 - 3 %    Basophils % 0.6 0 - 1 %    Segs Absolute 1.8 K/CU MM    Lymphocytes Absolute 2.1 K/CU MM    Monocytes Absolute 0.3 K/CU MM    Eosinophils Absolute 0.5 K/CU MM    Basophils Absolute 0.0 K/CU MM    Nucleated RBC % 0.0 %    Total Nucleated RBC 0.0 K/CU MM    Total Immature Neutrophil 0.00 K/CU MM    Immature Neutrophil % 0.0 0 - 0.43 %   Comprehensive Metabolic Panel w/ Reflex to MG   Result Value Ref Range    Sodium 137 135 - 145 MMOL/L    Potassium 4.1 3.5 - 5.1 MMOL/L    Chloride 106 99 - 110 mMol/L    CO2 22 21 - 32 MMOL/L    BUN 14 6 - 23 MG/DL    CREATININE 0.7 0.6 - 1.1 MG/DL    Glucose 98 70 - 99 MG/DL    Calcium 9.7 8.3 - 10.6 MG/DL    Alb 3.6 3.4 - 5.0 GM/DL    Total Protein 6.6 6.4 - 8.2 GM/DL    Total Bilirubin 0.1 0.0 - 1.0 MG/DL    ALT 17 10 - 40 U/L    AST 18 15 - 37 IU/L    Alkaline Phosphatase 110 40 - 129 IU/L    GFR Non-African American >60 >60 mL/min/1.73m2    GFR African American >60 >60 mL/min/1.73m2    Anion Gap 9 4 - 16   Lipase   Result Value Ref Range    Lipase 11 (L) 13 - 60 IU/L         IMAGING:  CT ABDOMEN PELVIS W IV CONTRAST   Final Result   No acute abnormality. MDM:  Patient signed out to me by Vandana Mercado. Lactic acid is 1.3. CBC shows hemoglobin of 9.9 and hematocrit of 32.8 which is stable from previous. CMP within normal limits. Lipase is 11. Urinalysis is unremarkable. CT abdomen pelvis with IV contrast shows no acute abnormality. Patient was provided Zofran, Phenergan and morphine while in emergency department. P.o.  fluid challenge

## 2020-03-06 NOTE — ED PROVIDER NOTES
eMERGENCY dEPARTMENT eNCOUnter      Gaby Menjivar    Chief Complaint   Patient presents with    Abdominal Pain    Emesis       HPI    Suraj Corrigan is a 44 y.o. female who presents with abdominal pain. Onset:  About a week ago. She was seen here at that time and states she felt better after receiving IV fluids. Pain has seemed to worsen again over the last few days, particularly tonight. Pain to the epigastric area with radiation to the back. Characterized as cramping, aching. Severity 7/10. Associated nausea and vomiting. She reports some loose stool. Temperature up to 100.1 at home. She denies chest pain or sob. States pain feels similar to previous attacks of pancreatitis. REVIEW OF SYSTEMS    See HPI for further details. Review of systems otherwise negative. Constitutional:  Denies fever. HENT:  Denies headache. Respiratory:  Denies any shortness of breath. Cardiovascular:  Denies chest pain. GI:  + abdominal pain, + nausea, + vomiting. :  Denies urinary symptoms. Musculoskeletal:  Denies any extremity pain. Back:  Denies back pain. Integument:  Denies any skin changes. Lymphatic:  Denies lymphadenopathy. PAST MEDICAL HISTORY    Past Medical History:   Diagnosis Date    Acid reflux     Anemia     Anxiety     Arthritis     Hands, Back And Ankles    Bleeding ulcer 2014    \"I Had Ulcers In My Stomach And Colon\"/ per pt on 8/12/2019\"they said recently having some blood in my stomach- in July ( 2019)could not find where coming from \"    Bronchitis Last Episode 2014    Chronic back pain     Chronic pain     Sees Dr. Radha Keating COPD (chronic obstructive pulmonary disease) ([facility]     Sees Dr. Delfina Hinojosa Depression     Fibromyalgia Dx 2013    H/O echocardiogram 8/11/15    EF >55%. LA to be at the upper limit of normal in size. LV hypertrophy with normal LV systolic, but abnormal diastolic function. Normal valvular structures and function.      H/O INCISION AND DRAINAGE. EXCISION OF ULCER.  RESECTION OF BONE. 1ST METATARSAL POWER LAVAGE AND BONE CEMENT performed by Pierre Sheppard DPM at [address] Left Last Done In 2016     Dr. Jean-Claude Reyna, \" About 6 Surgeries Done Because Of Staph Infection\"    HIATAL HERNIA REPAIR N/A 2/12/2019    HERNIA HIATAL LAPAROSCOPIC ROBOTIC performed by Glennie Epley, MD at [facility]  10/1997    Partial Abdominal Hysterectomy    INSERTION / REMOVAL / REPLACEMENT VENOUS ACCESS CATHETER N/A 8/15/2019    PORT INSERTION performed by Glennie Epley, MD at [address] [facility] [place]    removed after 6 months    LUNG REMOVAL, PARTIAL Left 2008    Benign    OTHER SURGICAL HISTORY  02/1998    \"Tubes And Ovaries Removed, Appendectomy Also Done\"    OTHER SURGICAL HISTORY  Last Done 7-15-16    Mediport Insertion \"Total Of Six Done, Removed Last Mediport In 2014\"    PORT SURGERY N/A 1/15/2020    PORT REMOVAL performed by Glennie Epley, MD at [address] [facility] N/A 2/12/2019    GASTRECTOMY SLEEVE LAPAROSCOPIC ROBOTIC performed by Glennie Epley, MD at [address] [place] [address] ENDOSCOPY  08/27/2018    UPPER GASTROINTESTINAL ENDOSCOPY N/A 4/2/2019    EGD CONTROL HEMORRHAGE with epi injection at bleeding site performed by Glennie Epley, MD at [place] [address] 6/19/2019    EGD DIAGNOSTIC ONLY performed by Glennie Epley, MD at [place] [address] 7/22/2019    EGD DIAGNOSTIC ONLY performed by Kip Schroeder MD at [place] [address] 10/14/2019    EGD DIAGNOSTIC ONLY performed by Glennie Epley, MD at [address] [place] [address]    Current Outpatient Rx   Medication Sig Dispense Refill    promethazine (PHENERGAN) 25 MG tablet Take 1 tablet by mouth 3 times daily as needed for Nausea 12 tablet 0    promethazine Patient was seen independently. -  Differential diagnosis includes:  appendicitis, gastritis, bowel obstruction, cholecystitis/cholelithiasis, diverticulitis, incarcerated hernia, pancreatitis, performed bowel, uti, and others   -  Work-up included:  see above  -  ED treatment included:  NS, morphine, Phenergan, Zofran  -  Patient's labs are unremarkable. CT is pending at the end of my shift. Case discussed with Courtney Bryson PA-C. Please see his note for further details of patient encounter, including final disposition. FINAL IMPRESSION    1.   Abdominal pain          Storm Poster, CYRIL  03/06/20 1010

## 2020-03-07 LAB
CULTURE: NORMAL
Lab: NORMAL
SPECIMEN: NORMAL

## 2020-03-25 RX ORDER — PROMETHAZINE HYDROCHLORIDE 25 MG/1
25 TABLET ORAL EVERY 6 HOURS PRN
Qty: 30 TABLET | Refills: 0 | Status: SHIPPED | OUTPATIENT
Start: 2020-03-25 | End: 2020-04-13

## 2020-03-27 ENCOUNTER — VIRTUAL VISIT (OUTPATIENT)
Dept: BARIATRICS/WEIGHT MGMT | Age: 50
End: 2020-03-27
Payer: COMMERCIAL

## 2020-03-27 PROBLEM — E66.01 MORBID OBESITY WITH BMI OF 40.0-44.9, ADULT (HCC): Status: ACTIVE | Noted: 2020-03-27

## 2020-03-27 PROCEDURE — 3017F COLORECTAL CA SCREEN DOC REV: CPT | Performed by: SURGERY

## 2020-03-27 PROCEDURE — 1036F TOBACCO NON-USER: CPT | Performed by: SURGERY

## 2020-03-27 PROCEDURE — 99214 OFFICE O/P EST MOD 30 MIN: CPT | Performed by: SURGERY

## 2020-03-27 PROCEDURE — G8417 CALC BMI ABV UP PARAM F/U: HCPCS | Performed by: SURGERY

## 2020-03-27 PROCEDURE — G8427 DOCREV CUR MEDS BY ELIG CLIN: HCPCS | Performed by: SURGERY

## 2020-03-27 PROCEDURE — G8482 FLU IMMUNIZE ORDER/ADMIN: HCPCS | Performed by: SURGERY

## 2020-03-27 NOTE — PROGRESS NOTES
Claudette Hodge is a 44 y.o. female evaluated via telephone on 3/27/2020. Consent:  She and/or health care decision maker is aware that that she may receive a bill for this telephone service, depending on her insurance coverage, and has provided verbal consent to proceed: Yes      Documentation:  I communicated with the patient and/or health care decision maker about her weight loss. Details of this discussion including any medical advice provided: diet and exercise. . prtn, doing better, pain right side US, ordered by dr Graciella Libman. CBC ordered, next week, mediport out. I affirm this is a Patient Initiated Episode with an Established Patient who has not had a related appointment within my department in the past 7 days or scheduled within the next 24 hours. Total Time: minutes: 11-20 minutes    Note: not billable if this call serves to triage the patient into an appointment for the relevant concern.       Yahir Mcclelland MD, FACS, FICS  3/27/2020  2:19 PM

## 2020-04-07 PROBLEM — D50.0 IRON DEFICIENCY ANEMIA SECONDARY TO BLOOD LOSS (CHRONIC): Status: ACTIVE | Noted: 2020-04-07

## 2020-04-07 PROBLEM — R22.9 LOCALIZED SKIN MASS, LUMP, OR SWELLING: Status: ACTIVE | Noted: 2020-04-07

## 2020-04-07 PROBLEM — R53.83 FATIGUE: Status: ACTIVE | Noted: 2020-04-07

## 2020-04-13 ENCOUNTER — HOSPITAL ENCOUNTER (OUTPATIENT)
Age: 50
Discharge: HOME | End: 2020-04-13
Payer: COMMERCIAL

## 2020-04-13 ENCOUNTER — HOSPITAL ENCOUNTER (OUTPATIENT)
Dept: ULTRASOUND IMAGING | Age: 50
Discharge: HOME | End: 2020-04-13
Payer: COMMERCIAL

## 2020-04-13 ENCOUNTER — APPOINTMENT (OUTPATIENT)
Dept: CT IMAGING | Age: 50
End: 2020-04-13
Payer: COMMERCIAL

## 2020-04-13 ENCOUNTER — HOSPITAL ENCOUNTER (EMERGENCY)
Age: 50
Discharge: HOME | End: 2020-04-13
Payer: COMMERCIAL

## 2020-04-13 VITALS
HEIGHT: 64 IN | WEIGHT: 260 LBS | TEMPERATURE: 97.7 F | SYSTOLIC BLOOD PRESSURE: 116 MMHG | OXYGEN SATURATION: 98 % | HEART RATE: 78 BPM | DIASTOLIC BLOOD PRESSURE: 86 MMHG | BODY MASS INDEX: 44.39 KG/M2 | RESPIRATION RATE: 18 BRPM

## 2020-04-13 LAB
ALBUMIN SERPL-MCNC: 4.4 GM/DL (ref 3.4–5)
ALP BLD-CCNC: 127 IU/L (ref 40–129)
ALT SERPL-CCNC: 24 U/L (ref 10–40)
ANION GAP SERPL CALCULATED.3IONS-SCNC: 14 MMOL/L (ref 4–16)
AST SERPL-CCNC: 25 IU/L (ref 15–37)
BACTERIA: NEGATIVE /HPF
BASOPHILS ABSOLUTE: 0 K/CU MM
BASOPHILS RELATIVE PERCENT: 0.8 % (ref 0–1)
BILIRUB SERPL-MCNC: 0.3 MG/DL (ref 0–1)
BILIRUBIN URINE: NEGATIVE MG/DL
BLOOD, URINE: NEGATIVE
BUN BLDV-MCNC: 13 MG/DL (ref 6–23)
CALCIUM SERPL-MCNC: 10.8 MG/DL (ref 8.3–10.6)
CHLORIDE BLD-SCNC: 104 MMOL/L (ref 99–110)
CLARITY: CLEAR
CO2: 20 MMOL/L (ref 21–32)
COLOR: YELLOW
CREAT SERPL-MCNC: 0.7 MG/DL (ref 0.6–1.1)
DIFFERENTIAL TYPE: ABNORMAL
EOSINOPHILS ABSOLUTE: 0.3 K/CU MM
EOSINOPHILS RELATIVE PERCENT: 5.4 % (ref 0–3)
GFR AFRICAN AMERICAN: >60 ML/MIN/1.73M2
GFR NON-AFRICAN AMERICAN: >60 ML/MIN/1.73M2
GLUCOSE BLD-MCNC: 89 MG/DL (ref 70–99)
GLUCOSE, URINE: NEGATIVE MG/DL
HCG QUALITATIVE: NORMAL
HCT VFR BLD CALC: 42.3 % (ref 37–47)
HEMOGLOBIN: 13.1 GM/DL (ref 12.5–16)
IMMATURE NEUTROPHIL %: 0.2 % (ref 0–0.43)
KETONES, URINE: NEGATIVE MG/DL
LEUKOCYTE ESTERASE, URINE: NEGATIVE
LIPASE: 15 IU/L (ref 13–60)
LYMPHOCYTES ABSOLUTE: 1.8 K/CU MM
LYMPHOCYTES RELATIVE PERCENT: 34.8 % (ref 24–44)
MCH RBC QN AUTO: 26.8 PG (ref 27–31)
MCHC RBC AUTO-ENTMCNC: 31 % (ref 32–36)
MCV RBC AUTO: 86.7 FL (ref 78–100)
MONOCYTES ABSOLUTE: 0.3 K/CU MM
MONOCYTES RELATIVE PERCENT: 5.6 % (ref 0–4)
MUCUS: ABNORMAL HPF
NITRITE URINE, QUANTITATIVE: NEGATIVE
NUCLEATED RBC %: 0 %
PDW BLD-RTO: 13.2 % (ref 11.7–14.9)
PH, URINE: 7 (ref 5–8)
PLATELET # BLD: 236 K/CU MM (ref 140–440)
PMV BLD AUTO: 10.3 FL (ref 7.5–11.1)
POTASSIUM SERPL-SCNC: 4.3 MMOL/L (ref 3.5–5.1)
PROTEIN UA: NEGATIVE MG/DL
RBC # BLD: 4.88 M/CU MM (ref 4.2–5.4)
RBC URINE: ABNORMAL /HPF (ref 0–6)
SEGMENTED NEUTROPHILS ABSOLUTE COUNT: 2.7 K/CU MM
SEGMENTED NEUTROPHILS RELATIVE PERCENT: 53.2 % (ref 36–66)
SODIUM BLD-SCNC: 138 MMOL/L (ref 135–145)
SPECIFIC GRAVITY UA: 1 (ref 1–1.03)
SQUAMOUS EPITHELIAL: 2 /HPF
TOTAL IMMATURE NEUTOROPHIL: 0.01 K/CU MM
TOTAL NUCLEATED RBC: 0 K/CU MM
TOTAL PROTEIN: 7.4 GM/DL (ref 6.4–8.2)
TRICHOMONAS: ABNORMAL /HPF
UROBILINOGEN, URINE: NORMAL MG/DL (ref 0.2–1)
WBC # BLD: 5.1 K/CU MM (ref 4–10.5)
WBC UA: ABNORMAL /HPF (ref 0–5)

## 2020-04-13 PROCEDURE — 85025 COMPLETE CBC W/AUTO DIFF WBC: CPT

## 2020-04-13 PROCEDURE — 36415 COLL VENOUS BLD VENIPUNCTURE: CPT

## 2020-04-13 PROCEDURE — 96375 TX/PRO/DX INJ NEW DRUG ADDON: CPT

## 2020-04-13 PROCEDURE — 76700 US EXAM ABDOM COMPLETE: CPT

## 2020-04-13 PROCEDURE — 80053 COMPREHEN METABOLIC PANEL: CPT

## 2020-04-13 PROCEDURE — 83690 ASSAY OF LIPASE: CPT

## 2020-04-13 PROCEDURE — 86803 HEPATITIS C AB TEST: CPT

## 2020-04-13 PROCEDURE — 96376 TX/PRO/DX INJ SAME DRUG ADON: CPT

## 2020-04-13 PROCEDURE — 6360000002 HC RX W HCPCS: Performed by: PHYSICIAN ASSISTANT

## 2020-04-13 PROCEDURE — 84703 CHORIONIC GONADOTROPIN ASSAY: CPT

## 2020-04-13 PROCEDURE — 99284 EMERGENCY DEPT VISIT MOD MDM: CPT

## 2020-04-13 PROCEDURE — 81001 URINALYSIS AUTO W/SCOPE: CPT

## 2020-04-13 PROCEDURE — 96374 THER/PROPH/DIAG INJ IV PUSH: CPT

## 2020-04-13 PROCEDURE — 2580000003 HC RX 258: Performed by: PHYSICIAN ASSISTANT

## 2020-04-13 PROCEDURE — 74176 CT ABD & PELVIS W/O CONTRAST: CPT

## 2020-04-13 RX ORDER — 0.9 % SODIUM CHLORIDE 0.9 %
1000 INTRAVENOUS SOLUTION INTRAVENOUS ONCE
Status: COMPLETED | OUTPATIENT
Start: 2020-04-13 | End: 2020-04-13

## 2020-04-13 RX ORDER — ONDANSETRON 4 MG/1
4 TABLET, ORALLY DISINTEGRATING ORAL 3 TIMES DAILY PRN
Qty: 21 TABLET | Refills: 0 | Status: SHIPPED | OUTPATIENT
Start: 2020-04-13 | End: 2020-07-02

## 2020-04-13 RX ORDER — MORPHINE SULFATE 4 MG/ML
4 INJECTION, SOLUTION INTRAMUSCULAR; INTRAVENOUS EVERY 30 MIN PRN
Status: DISCONTINUED | OUTPATIENT
Start: 2020-04-13 | End: 2020-04-14 | Stop reason: HOSPADM

## 2020-04-13 RX ORDER — ONDANSETRON 2 MG/ML
4 INJECTION INTRAMUSCULAR; INTRAVENOUS EVERY 30 MIN PRN
Status: DISCONTINUED | OUTPATIENT
Start: 2020-04-13 | End: 2020-04-14 | Stop reason: HOSPADM

## 2020-04-13 RX ORDER — DICYCLOMINE HYDROCHLORIDE 10 MG/1
10 CAPSULE ORAL 3 TIMES DAILY PRN
Qty: 21 CAPSULE | Refills: 3 | Status: SHIPPED | OUTPATIENT
Start: 2020-04-13 | End: 2021-05-13 | Stop reason: SDUPTHER

## 2020-04-13 RX ORDER — PROMETHAZINE HYDROCHLORIDE 25 MG/1
25 TABLET ORAL EVERY 6 HOURS PRN
Qty: 30 TABLET | Refills: 0 | Status: SHIPPED | OUTPATIENT
Start: 2020-04-13 | End: 2020-05-06 | Stop reason: SDUPTHER

## 2020-04-13 RX ORDER — ONDANSETRON 4 MG/1
4 TABLET, FILM COATED ORAL EVERY 8 HOURS PRN
Qty: 10 TABLET | Refills: 0 | Status: SHIPPED | OUTPATIENT
Start: 2020-04-13 | End: 2020-04-13 | Stop reason: ALTCHOICE

## 2020-04-13 RX ADMIN — ONDANSETRON 4 MG: 2 INJECTION INTRAMUSCULAR; INTRAVENOUS at 18:49

## 2020-04-13 RX ADMIN — SODIUM CHLORIDE 1000 ML: 9 INJECTION, SOLUTION INTRAVENOUS at 18:50

## 2020-04-13 RX ADMIN — MORPHINE SULFATE 4 MG: 4 INJECTION, SOLUTION INTRAMUSCULAR; INTRAVENOUS at 18:50

## 2020-04-13 RX ADMIN — ONDANSETRON 4 MG: 2 INJECTION INTRAMUSCULAR; INTRAVENOUS at 19:32

## 2020-04-13 RX ADMIN — MORPHINE SULFATE 4 MG: 4 INJECTION, SOLUTION INTRAMUSCULAR; INTRAVENOUS at 19:32

## 2020-04-13 ASSESSMENT — PAIN SCALES - GENERAL
PAINLEVEL_OUTOF10: 7

## 2020-04-13 ASSESSMENT — PAIN DESCRIPTION - LOCATION: LOCATION: ABDOMEN

## 2020-04-13 ASSESSMENT — PAIN DESCRIPTION - ORIENTATION: ORIENTATION: RIGHT;UPPER

## 2020-04-13 ASSESSMENT — PAIN DESCRIPTION - PAIN TYPE: TYPE: ACUTE PAIN

## 2020-04-13 NOTE — ED PROVIDER NOTES
Triage Chief Complaint:   Abdominal Pain (right sided; hx of pancreatitis; started  3-4 days ago and has progressed); Nausea; and Emesis (x 2 days)    [place]:  Today in the ED I had the pleasure of caring for Kevin Cespedes who is a 44 y.o. female that presents today to the emergency department complaining of midepigastric/right upper quadrant abdominal pain radiating into the right flank. Ongoing x2 days with associated nausea vomiting. No fever chills. No lower abdominal pain no urinary symptoms. She has a history of chronic pancreatitis that she has had times many years. States this feels similar nature to her pancreatitis. Took Percocet at home. However states symptoms did not help. Mainly because she was throwing up every time she took her medications. She denies any chest pain palpitations. No abdominal trauma. ROS:  REVIEW OF SYSTEMS    At least 10 systems reviewed      All other review of systems are negative  See HPI and nursing notes for additional information       Past Medical History:   Diagnosis Date    Acid reflux     Anemia     Anxiety     Arthritis     Hands, Back And Ankles    Bleeding ulcer 2014    \"I Had Ulcers In My Stomach And Colon\"/ per pt on 8/12/2019\"they said recently having some blood in my stomach- in July ( 2019)could not find where coming from \"    Bronchitis Last Episode 2014    Chronic back pain     Chronic pain     Sees Dr. Nu Mosley COPD (chronic obstructive pulmonary disease) ([facility] [address]     Sees Dr. Ashley Inman Depression     Fibromyalgia Dx 2013    H/O echocardiogram 8/11/15    EF >55%. LA to be at the upper limit of normal in size. LV hypertrophy with normal LV systolic, but abnormal diastolic function. Normal valvular structures and function.      H/O echocardiogram 08/30/2018    EF 55-60%    Hiatal hernia     History of blood transfusion 09/2015 And 2018    No Reaction To Blood Transfusions Received    Yuhaaviatam (hard of hearing) Right Ear    Hx of cardiac catheterization 10/24/2010    Angiographically normal coronary arteries w/ normal LV function and wall motion.  Hx of transesophageal echocardiography (PILO) for monitoring 12/2/2010    EF 55-60%. WNL. 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 name    Lupus [facility]) Dx 2013    \"Rheumatoid Lupus\"    Morbid obesity ([facility] [address]     MRSA bacteremia     Nausea     \"Most Of The Time\"    Pain management     Sees Dr. Butch Fernandes, Once A Month    Pancreatitis chronic Dx 2001    Pneumonia Dx 11-15    Shortness of breath on exertion     Shortness of breath on exertion     Sleep apnea     Has CPAP\"no longer use the cpap since lost weight\"    Staph infection Dx 11-15    Left Foot    Staph infection Dx 11-15    \"Left Foot\"    Teeth missing     Upper And Lower    Thyroid disease     UTI (urinary tract infection) In Past    No Current Symptoms    Wears glasses     To Read     Past Surgical History:   Procedure Laterality Date    APPENDECTOMY  02/1998    Done When Tubes And Ovaries Were Removed    CARDIAC CATHETERIZATION  10/24/2010    CATHETER REMOVAL N/A 7/16/2019    PORT REMOVAL performed by Saurabh Driver MD at [address]  08/27/1991    CHOLECYSTECTOMY, LAPAROSCOPIC  1990's    COLONOSCOPY  Last Done In 2000's    DENTAL SURGERY      Teeth Extracted In Past    ENDOSCOPY, COLON, DIAGNOSTIC  Last Done In 2018    FOOT DEBRIDEMENT Left 6/16/2019    FIRST METATARSAL DEBRIDEMENT INCISION AND DRAINAGE. EXCISION OF ULCER.  RESECTION OF BONE. 1ST METATARSAL POWER LAVAGE AND BONE CEMENT performed by Arsenio Carballo DPM at [address] Left Last Done In 2016     Dr. Lyssa Fonseca, \" Loss Son     Lupus Daughter     Other Daughter         \"Alot Of Female Problems\"     Social History     Socioeconomic History    Marital status:      Spouse name: Not on file    Number of children: Not on file    Years of education: Not on file    Highest education level: Not on file   Occupational History    Not on file   Social Needs    Financial resource strain: Not on file    Food insecurity     Worry: Not on file     Inability: Not on file   [employer] needs     Medical: Not on file     Non-medical: Not on file   Tobacco Use    Smoking status: Never Smoker    Smokeless tobacco: Never Used   Substance and Sexual Activity    Alcohol use: No     Alcohol/week: 0.0 standard drinks    Drug use: No    Sexual activity: Not Currently   Lifestyle    Physical activity     Days per week: Not on file     Minutes per session: Not on file    Stress: Not on file   Relationships    Social connections     Talks on phone: Not on file     Gets together: Not on file     Attends Christianity service: Not on file     Active member of club or organization: Not on file     Attends meetings of clubs or organizations: Not on file     Relationship status: Not on file    Intimate partner violence     Fear of current or ex partner: Not on file     Emotionally abused: Not on file     Physically abused: Not on file     Forced sexual activity: Not on file   Other Topics Concern    Not on file   Social History Narrative    Not on file     No current facility-administered medications for this encounter.       Current Outpatient Medications   Medication Sig Dispense Refill    dicyclomine (BENTYL) 10 MG capsule Take 1 capsule by mouth 3 times daily as needed (abdominal pain) 21 capsule 3    ondansetron (ZOFRAN-ODT) 4 MG disintegrating tablet Take 1 tablet by mouth 3 times daily as needed for Nausea or Vomiting 21 tablet 0    promethazine (PHENERGAN) 25 MG tablet TAKE 1 TABLET BY MOUTH EVERY 6 HOURS AS NEEDED FOR [Ketorolac Tromethamine] Rash    Compazine [Prochlorperazine]     Reglan [Metoclopramide] Itching       Nursing Notes Reviewed    Physical Exam:  ED Triage Vitals [04/13/20 1512]   Enc Vitals Group      /71      Pulse 78      Resp 18      Temp 97.7 °F (36.5 °C)      Temp Source Oral      SpO2 96 %      Weight 260 lb (117.9 kg)      Height 5' 4\" (1.626 m)      Head Circumference       Peak Flow       Pain Score       Pain Loc       Pain Edu? Excl. in [address]? General :Patient is awake alert oriented person place and time no acute distress nontoxic appearing  HEENT: Pupils are equally round and reactive to light extraocular motors are intact conjunctivae clear sclerae white there is no injection no icterus. Nose without any rhinorrhea or epistaxis. Oral mucosa is moist no exudate buccal mucosa shows no ulcerations. Uvula is midline    Neck: Neck is supple full range of motion trachea midline thyroid nonpalpable  Cardiac: Heart regular rate rhythm no murmurs rubs clicks or gallops  Lungs: Lungs are clear to auscultation there is no wheezing rhonchi or rales. There is no use of accessory muscles no nasal flaring identified. Chest wall: There is no tenderness to palpation over the chest wall or over ribs  Abdomen: Abdomen is soft nontender nondistended. There is no firm or pulsatile masses no rebound rigidity or guarding negative Champion's negative McBurney, no peritoneal signs  Suprapubic:  there is no tenderness to palpation over the external bladder   Musculoskeletal: 5 out of 5 strength in all 4 extremities full flexion extension abduction and adduction supination pronation of all extremities and all digits. No obvious muscle atrophy is noted. No focal muscle deficits are appreciated  Dermatology: Skin is warm and dry there is no obvious abscesses lacerations or lesions noted  Psych: Mentation is grossly normal cognition is grossly normal. Affect is appropriate  Neuro:  Motor intact sensory intact history. Common bile duct is within normal limits measuring 4 mm. KIDNEYS: The kidneys were suboptimally visualized sonographically. The visualized kidneys are unremarkable in appearance without evidence of hydronephrosis. PANCREAS: The pancreas was suboptimally visualized sonographically. Visualized portions of the pancreas are unremarkable. SPLEEN: The spleen is unremarkable in appearance. Spleen is at the upper limits of normal in size at 13.9 cm in length; this was previously measured at 15 cm on the prior ultrasound in 2010. IVC: The IVC is patent. AORTA: Aorta is patent without aneurysm. OTHER: No evidence of ascites. No acute findings. No evidence of biliary ductal dilatation post cholecystectomy. MDM:   Abdominal exam benign on initial and repeat examinations patient presents today to the ED with abdominal pain. Associated nausea vomiting. Vital signs are stable here in the ED slightly hypertensive with a diastolic of 86. Patient's labs including CBC metabolic panel urine pregnancy lipase urine revealed no acute abnormality that would require emergent intervention here in the ED. And CT scan per radiologist interpretation revealed no acute abnormality identified. Vertically no evidence of any renal stones obstructive uropathy. Patient was provided with IV fluids. And symptoms to improve. I estimate there is LOW risk for (including but not limited to) ACUTE APPENDICITIS, BOWEL OBSTRUCTION, ACUTE CHOLECYSTITIS, RUPTURED DIVERTICULITIS, INCARCERATED or STRANGULATED HERNIA, HEMMORHAGIC PANCREATITIS, PERFORATED BOWEL/ULCER, ECTOPIC PREGNANCY, OVARIAN TORSION or TUBO-OVARIAN ABSCESS thus I consider the discharge disposition reasonable   have discussed the findings of today's workup with the patient and present family members and have addressed their questions and concerns.  Important warning signs as well as new or worsening symptoms which would necessitate immediate return to the ED were

## 2020-04-13 NOTE — TELEPHONE ENCOUNTER
Pt called asking for a refill on Synthroid. Last prescribed on 3/25/20. Too soon for another refill.

## 2020-04-14 ENCOUNTER — CARE COORDINATION (OUTPATIENT)
Dept: CARE COORDINATION | Age: 50
End: 2020-04-14

## 2020-05-06 RX ORDER — PROMETHAZINE HYDROCHLORIDE 25 MG/1
25 TABLET ORAL EVERY 6 HOURS PRN
Qty: 30 TABLET | Refills: 0 | Status: SHIPPED | OUTPATIENT
Start: 2020-05-06 | End: 2020-06-08 | Stop reason: SDUPTHER

## 2020-05-06 RX ORDER — PANTOPRAZOLE SODIUM 40 MG/1
40 TABLET, DELAYED RELEASE ORAL
Qty: 90 TABLET | Refills: 1 | Status: SHIPPED | OUTPATIENT
Start: 2020-05-06 | End: 2021-01-26 | Stop reason: CLARIF

## 2020-06-08 RX ORDER — PROMETHAZINE HYDROCHLORIDE 25 MG/1
25 TABLET ORAL EVERY 6 HOURS PRN
Qty: 30 TABLET | Refills: 0 | Status: ON HOLD | OUTPATIENT
Start: 2020-06-08 | End: 2020-07-07 | Stop reason: HOSPADM

## 2020-06-24 ENCOUNTER — HOSPITAL ENCOUNTER (OUTPATIENT)
Dept: INFUSION THERAPY | Age: 50
Discharge: HOME | End: 2020-06-24
Payer: COMMERCIAL

## 2020-06-24 LAB
BASOPHILS ABSOLUTE: 0 K/CU MM
BASOPHILS RELATIVE PERCENT: 0.5 % (ref 0–1)
DIFFERENTIAL TYPE: ABNORMAL
EOSINOPHILS ABSOLUTE: 0.3 K/CU MM
EOSINOPHILS RELATIVE PERCENT: 7.4 % (ref 0–3)
FERRITIN: 31 NG/ML (ref 15–150)
HCT VFR BLD CALC: 35.4 % (ref 37–47)
HEMOGLOBIN: 11.4 GM/DL (ref 12.5–16)
IRON: 67 UG/DL (ref 37–145)
LYMPHOCYTES ABSOLUTE: 1.8 K/CU MM
LYMPHOCYTES RELATIVE PERCENT: 40.7 % (ref 24–44)
MCH RBC QN AUTO: 26.8 PG (ref 27–31)
MCHC RBC AUTO-ENTMCNC: 32.2 % (ref 32–36)
MCV RBC AUTO: 83.3 FL (ref 78–100)
MONOCYTES ABSOLUTE: 0.4 K/CU MM
MONOCYTES RELATIVE PERCENT: 8.3 % (ref 0–4)
PCT TRANSFERRIN: 18 % (ref 10–44)
PDW BLD-RTO: 14.5 % (ref 11.7–14.9)
PLATELET # BLD: 195 K/CU MM (ref 140–440)
PMV BLD AUTO: 10.2 FL (ref 7.5–11.1)
RBC # BLD: 4.25 M/CU MM (ref 4.2–5.4)
SEGMENTED NEUTROPHILS ABSOLUTE COUNT: 1.9 K/CU MM
SEGMENTED NEUTROPHILS RELATIVE PERCENT: 43.1 % (ref 36–66)
TOTAL IRON BINDING CAPACITY: 365 UG/DL (ref 250–450)
UNSATURATED IRON BINDING CAPACITY: 298 UG/DL (ref 110–370)
VITAMIN B-12: >2000 PG/ML (ref 211–911)
WBC # BLD: 4.3 K/CU MM (ref 4–10.5)

## 2020-06-24 PROCEDURE — 83540 ASSAY OF IRON: CPT

## 2020-06-24 PROCEDURE — 83550 IRON BINDING TEST: CPT

## 2020-06-24 PROCEDURE — 82728 ASSAY OF FERRITIN: CPT

## 2020-06-24 PROCEDURE — 85025 COMPLETE CBC W/AUTO DIFF WBC: CPT

## 2020-06-24 PROCEDURE — 36415 COLL VENOUS BLD VENIPUNCTURE: CPT

## 2020-06-24 PROCEDURE — 99211 OFF/OP EST MAY X REQ PHY/QHP: CPT

## 2020-06-24 PROCEDURE — 6360000002 HC RX W HCPCS

## 2020-06-24 PROCEDURE — 82607 VITAMIN B-12: CPT

## 2020-06-24 RX ORDER — HEPARIN SODIUM (PORCINE) LOCK FLUSH IV SOLN 100 UNIT/ML 100 UNIT/ML
SOLUTION INTRAVENOUS
Status: DISCONTINUED
Start: 2020-06-24 | End: 2020-06-24 | Stop reason: WASHOUT

## 2020-06-26 ENCOUNTER — TELEMEDICINE (OUTPATIENT)
Dept: BARIATRICS/WEIGHT MGMT | Age: 50
End: 2020-06-26
Payer: COMMERCIAL

## 2020-06-26 PROBLEM — R11.14 BILIOUS VOMITING WITH NAUSEA: Status: ACTIVE | Noted: 2020-06-26

## 2020-06-26 PROBLEM — Z76.89 ENCOUNTER FOR WEIGHT MANAGEMENT: Status: ACTIVE | Noted: 2020-06-26

## 2020-06-26 PROCEDURE — 99214 OFFICE O/P EST MOD 30 MIN: CPT | Performed by: SURGERY

## 2020-06-26 PROCEDURE — 1036F TOBACCO NON-USER: CPT | Performed by: SURGERY

## 2020-06-26 PROCEDURE — G8427 DOCREV CUR MEDS BY ELIG CLIN: HCPCS | Performed by: SURGERY

## 2020-06-26 PROCEDURE — G8417 CALC BMI ABV UP PARAM F/U: HCPCS | Performed by: SURGERY

## 2020-06-26 PROCEDURE — 3017F COLORECTAL CA SCREEN DOC REV: CPT | Performed by: SURGERY

## 2020-06-26 ASSESSMENT — ENCOUNTER SYMPTOMS
PHOTOPHOBIA: 0
VOMITING: 0
VOICE CHANGE: 0
SHORTNESS OF BREATH: 0
ANAL BLEEDING: 0
SORE THROAT: 0
CONSTIPATION: 0
NAUSEA: 0
BLOOD IN STOOL: 0
TROUBLE SWALLOWING: 0
COUGH: 0
COLOR CHANGE: 0
ABDOMINAL PAIN: 0
DIARRHEA: 0
WHEEZING: 0

## 2020-06-26 NOTE — PROGRESS NOTES
HIV screen  10/23/1983    DTaP/Tdap/Td vaccine (1 - Tdap) 10/23/1987    Cervical cancer screen  10/23/1989    Lipid screen  10/23/2008    Colon Cancer Screen FIT/FOBT  10/23/2018    Shingles Vaccine (1 of 2) 10/23/2018    Annual Wellness Visit (AWV)  06/18/2019    Breast cancer screen  02/10/2022    Flu vaccine  Completed    Hepatitis A vaccine  Aged Out    Hepatitis B vaccine  Aged Out    Hib vaccine  Aged Out    Meningococcal (ACWY) vaccine  Aged Out    Pneumococcal 0-64 years Vaccine  Aged Out       PHYSICAL EXAMINATION:  [ INSTRUCTIONS:  \"[x]\" Indicates a positive item  \"[]\" Indicates a negative item  -- DELETE ALL ITEMS NOT EXAMINED]  Vital Signs: (As obtained by patient/caregiver or practitioner observation)    Blood pressure-  Heart rate-    Respiratory rate-    Temperature-  Pulse oximetry-     Constitutional: [x] Appears well-developed and well-nourished [x] No apparent distress      [] Abnormal-   Mental status  [x] Alert and awake  [x] Oriented to person/place/time [x]Able to follow commands      Eyes:  EOM    [x]  Normal  [] Abnormal-  Sclera  [x]  Normal  [] Abnormal -         Discharge [x]  None visible  [] Abnormal -    HENT:   [x] Normocephalic, atraumatic.   [] Abnormal   [x] Mouth/Throat: Mucous membranes are moist.     External Ears [x] Normal  [] Abnormal-     Neck: [x] No visualized mass     Pulmonary/Chest: [x] Respiratory effort normal.  [x] No visualized signs of difficulty breathing or respiratory distress        [] Abnormal-      Musculoskeletal:   [x] Normal gait with no signs of ataxia         [x] Normal range of motion of neck        [] Abnormal-       Neurological:        [x] No Facial Asymmetry (Cranial nerve 7 motor function) (limited exam to video visit)          [x] No gaze palsy        [] Abnormal-         Skin:        [x] No significant exanthematous lesions or discoloration noted on facial skin         [] Abnormal-            Psychiatric:       [x] Normal Affect [x] No Hallucinations        [] Abnormal-     Other pertinent observable physical exam findings-     ASSESSMENT/PLAN:    1. Bilious vomiting with nausea      2. Encounter for weight management      3. Poor intravenous access    Trying ti go to the Gymn, 8 Lbs -12 Lbs. Return in about 2 weeks (around 7/10/2020), or EGD, and Medi-Port , for Surgery. Unruly Randle is a 44 y.o. female being evaluated by a Virtual Visit (video visit) encounter to address concerns as mentioned above. A caregiver was present when appropriate. Due to this being a TeleHealth encounter (During [facility]- public health emergency), evaluation of the following organ systems was limited: Vitals/Constitutional/EENT/Resp/CV/GI//MS/Neuro/Skin/Heme-Lymph-Imm. Pursuant to the emergency declaration under the [address] [place] [address], [address] [place] [address] authority and the [employer] Act, this Virtual Visit was conducted with patient's (and/or legal guardian's) consent, to reduce the patient's risk of exposure to COVID-19 and provide necessary medical care. The patient (and/or legal guardian) has also been advised to contact this office for worsening conditions or problems, and seek emergency medical treatment and/or call 911 if deemed necessary. Patient identification was verified at the start of the visit: YES    Total time spent on this encounter: 25 min    Services were provided through a video synchronous discussion virtually to substitute for in-person clinic visit. Patient and provider were located at their individual homes. --Mony Álvarez MD on 6/26/2020 at 2:47 PM    An electronic signature was used to authenticate this note.

## 2020-06-30 ENCOUNTER — TELEPHONE (OUTPATIENT)
Dept: BARIATRICS/WEIGHT MGMT | Age: 50
End: 2020-06-30

## 2020-06-30 NOTE — TELEPHONE ENCOUNTER
LEFT MESSAGE FOR Andre Pope REGARDING SURGERY (Mediport Placement) SCHEDULED @ [facility]. NOTIFIED OF DATES, TIMES AND LOCATION    PHONE ASSESSMENT  SURGERY - 7/7/20 @ 1345, 1145 arrival    REGARDING SURGERY (EGD) SCHEDULED @ [facility]. NOTIFIED OF DATES, TIMES AND LOCATION    PHONE ASSESSMENT  SURGERY - 7/17/20 @ 0900, 0700 arrival    P/O - 7/23/20  at 0930 with Dr. Nick Torres, due to [facility] being out of [facility].     NPO AFTER MIDNIGHT  Covid-19 Culture on 7/3/20 @ 1000   Covid-19 Culture on 7/13/20 @ 1000  HOLD BLOOD THINNERS - Not on thinners

## 2020-07-01 ENCOUNTER — TELEPHONE (OUTPATIENT)
Dept: SURGERY | Age: 50
End: 2020-07-01

## 2020-07-01 NOTE — TELEPHONE ENCOUNTER
Location: [facility]     Patient Name:  Chika Chatman   Birthday:   1970    Social Security:   Gender:  female   Home Phone:  [phone]  Cell Phone: [phone] (home)    Address:  [place] [id number]     Primary Insurance:  Jennifer Kamleshal    ID#:   [id number]   Secondary Ins:             ID#:        Patient Type:  SAME DAY SURGERY  Practitioner: Dr. Leland Guerrero  Procedure: EGD W BX  Anesthesia: MAC  Diagnosis: NAUSEA, VOMITING  Procedure Date:  7/17/20  Time: 900  Length of procedure: 45MINS  Testing: [x]? Phone Assessment     []? Pre-Admission testing   Date/Time:   Special needs for Procedure:            Weight:      Is the Patient on Blood Thinner? []? Yes     []? No   If YES told to stop on: Allergy: []? LATEX   []? Iodine   []? DYE  Other Allergies: SEE EPIC CHART  Will the patient have a  the day of procedure? [x]? Yes   []? No  Spoke with pt on both procedures, pt acknowledged info.

## 2020-07-01 NOTE — TELEPHONE ENCOUNTER
Location: [facility]     Patient Name:  Andra Turpin   Birthday:   1970    Social Security:   Gender:  female   Home Phone:  [phone]  Cell Phone: [phone] (home)    Address:  [place] [id number]    Primary Insurance:  Digan Sheffield    ID#:   [id number]   Secondary Ins:             ID#:       Patient Type:  SAME DAY SURGERY  Practitioner: Dr. Eugenia Hester  Procedure: EGD W BX  Anesthesia: MAC  Diagnosis: NAUSEA, VOMITING  Procedure Date:  7/17/20 [facility]  Time: 9:00 AM arrival time 7:00 AM  Length of procedure: 45MINS  Testing: [x] Phone Assessment     []   Pre-Admission testing   Date/Time:   Special needs for Procedure:     Weight:      Is the Patient on Blood Thinner? [] Yes     [] No   If YES told to stop on: Allergy: [] LATEX   [] Iodine   [] DYE  Other Allergies: SEE EPIC CHART  Will the patient have a  the day of procedure? [x] Yes   [] No  Advised pt of upcoming procedures, pt acknowledged info.

## 2020-07-03 ENCOUNTER — HOSPITAL ENCOUNTER (OUTPATIENT)
Dept: LAB | Age: 50
Discharge: HOME | DRG: 982 | End: 2020-07-03
Payer: COMMERCIAL

## 2020-07-03 ENCOUNTER — APPOINTMENT (OUTPATIENT)
Dept: GENERAL RADIOLOGY | Age: 50
DRG: 982 | End: 2020-07-03
Payer: COMMERCIAL

## 2020-07-03 ENCOUNTER — APPOINTMENT (OUTPATIENT)
Dept: CT IMAGING | Age: 50
DRG: 982 | End: 2020-07-03
Payer: COMMERCIAL

## 2020-07-03 ENCOUNTER — HOSPITAL ENCOUNTER (INPATIENT)
Age: 50
LOS: 4 days | Discharge: HOME | DRG: 982 | End: 2020-07-07
Attending: GENERAL PRACTICE | Admitting: GENERAL PRACTICE
Payer: COMMERCIAL

## 2020-07-03 PROBLEM — R11.2 INTRACTABLE NAUSEA AND VOMITING: Status: ACTIVE | Noted: 2020-07-03

## 2020-07-03 LAB
ALBUMIN SERPL-MCNC: 4.9 GM/DL (ref 3.4–5)
ALP BLD-CCNC: 120 IU/L (ref 40–129)
ALT SERPL-CCNC: 23 U/L (ref 10–40)
AMPHETAMINES: NEGATIVE
ANION GAP SERPL CALCULATED.3IONS-SCNC: 10 MMOL/L (ref 4–16)
AST SERPL-CCNC: 29 IU/L (ref 15–37)
BACTERIA: ABNORMAL /HPF
BARBITURATE SCREEN URINE: NEGATIVE
BASOPHILS ABSOLUTE: 0.1 K/CU MM
BASOPHILS RELATIVE PERCENT: 0.9 % (ref 0–1)
BENZODIAZEPINE SCREEN, URINE: NEGATIVE
BILIRUB SERPL-MCNC: 0.3 MG/DL (ref 0–1)
BILIRUBIN URINE: NEGATIVE MG/DL
BLOOD, URINE: NEGATIVE
BUN BLDV-MCNC: 16 MG/DL (ref 6–23)
CALCIUM SERPL-MCNC: 10.6 MG/DL (ref 8.3–10.6)
CANNABINOID SCREEN URINE: NEGATIVE
CHLORIDE BLD-SCNC: 101 MMOL/L (ref 99–110)
CLARITY: CLEAR
CO2: 24 MMOL/L (ref 21–32)
COCAINE METABOLITE: NEGATIVE
COLOR: YELLOW
CREAT SERPL-MCNC: 0.8 MG/DL (ref 0.6–1.1)
DIFFERENTIAL TYPE: ABNORMAL
EOSINOPHILS ABSOLUTE: 0.2 K/CU MM
EOSINOPHILS RELATIVE PERCENT: 4.1 % (ref 0–3)
GFR AFRICAN AMERICAN: >60 ML/MIN/1.73M2
GFR NON-AFRICAN AMERICAN: >60 ML/MIN/1.73M2
GLUCOSE BLD-MCNC: 89 MG/DL (ref 70–99)
GLUCOSE, URINE: NEGATIVE MG/DL
HCT VFR BLD CALC: 40.2 % (ref 37–47)
HEMOGLOBIN: 12.8 GM/DL (ref 12.5–16)
IMMATURE NEUTROPHIL %: 0.3 % (ref 0–0.43)
KETONES, URINE: NEGATIVE MG/DL
LEUKOCYTE ESTERASE, URINE: NEGATIVE
LIPASE: 20 IU/L (ref 13–60)
LYMPHOCYTES ABSOLUTE: 2.3 K/CU MM
LYMPHOCYTES RELATIVE PERCENT: 39.3 % (ref 24–44)
MCH RBC QN AUTO: 26.8 PG (ref 27–31)
MCHC RBC AUTO-ENTMCNC: 31.8 % (ref 32–36)
MCV RBC AUTO: 84.1 FL (ref 78–100)
MONOCYTES ABSOLUTE: 0.3 K/CU MM
MONOCYTES RELATIVE PERCENT: 5.7 % (ref 0–4)
MUCUS: ABNORMAL HPF
NITRITE URINE, QUANTITATIVE: NEGATIVE
NUCLEATED RBC %: 0 %
OPIATES, URINE: NEGATIVE
OXYCODONE: ABNORMAL
PDW BLD-RTO: 14.2 % (ref 11.7–14.9)
PH, URINE: 6 (ref 5–8)
PHENCYCLIDINE, URINE: NEGATIVE
PLATELET # BLD: 246 K/CU MM (ref 140–440)
PMV BLD AUTO: 10 FL (ref 7.5–11.1)
POTASSIUM SERPL-SCNC: 4.1 MMOL/L (ref 3.5–5.1)
PROTEIN UA: NEGATIVE MG/DL
RBC # BLD: 4.78 M/CU MM (ref 4.2–5.4)
RBC URINE: ABNORMAL /HPF (ref 0–6)
SEGMENTED NEUTROPHILS ABSOLUTE COUNT: 2.9 K/CU MM
SEGMENTED NEUTROPHILS RELATIVE PERCENT: 49.7 % (ref 36–66)
SODIUM BLD-SCNC: 135 MMOL/L (ref 135–145)
SPECIFIC GRAVITY UA: 1.02 (ref 1–1.03)
SQUAMOUS EPITHELIAL: 3 /HPF
TOTAL IMMATURE NEUTOROPHIL: 0.02 K/CU MM
TOTAL NUCLEATED RBC: 0 K/CU MM
TOTAL PROTEIN: 8.2 GM/DL (ref 6.4–8.2)
TRICHOMONAS: ABNORMAL /HPF
TROPONIN T: <0.01 NG/ML
UROBILINOGEN, URINE: NORMAL MG/DL (ref 0.2–1)
WBC # BLD: 5.8 K/CU MM (ref 4–10.5)
WBC UA: <1 /HPF (ref 0–5)

## 2020-07-03 PROCEDURE — U0002 COVID-19 LAB TEST NON-CDC: HCPCS

## 2020-07-03 PROCEDURE — 71045 X-RAY EXAM CHEST 1 VIEW: CPT

## 2020-07-03 PROCEDURE — 2580000003 HC RX 258: Performed by: PHYSICIAN ASSISTANT

## 2020-07-03 PROCEDURE — 84484 ASSAY OF TROPONIN QUANT: CPT

## 2020-07-03 PROCEDURE — 96372 THER/PROPH/DIAG INJ SC/IM: CPT

## 2020-07-03 PROCEDURE — 81001 URINALYSIS AUTO W/SCOPE: CPT

## 2020-07-03 PROCEDURE — 80307 DRUG TEST PRSMV CHEM ANLYZR: CPT

## 2020-07-03 PROCEDURE — 74176 CT ABD & PELVIS W/O CONTRAST: CPT

## 2020-07-03 PROCEDURE — 96374 THER/PROPH/DIAG INJ IV PUSH: CPT

## 2020-07-03 PROCEDURE — 76937 US GUIDE VASCULAR ACCESS: CPT

## 2020-07-03 PROCEDURE — 96361 HYDRATE IV INFUSION ADD-ON: CPT

## 2020-07-03 PROCEDURE — 99285 EMERGENCY DEPT VISIT HI MDM: CPT

## 2020-07-03 PROCEDURE — 6360000002 HC RX W HCPCS: Performed by: PHYSICIAN ASSISTANT

## 2020-07-03 PROCEDURE — 83690 ASSAY OF LIPASE: CPT

## 2020-07-03 PROCEDURE — 2060000000 HC ICU INTERMEDIATE R&B

## 2020-07-03 PROCEDURE — 93005 ELECTROCARDIOGRAM TRACING: CPT | Performed by: PHYSICIAN ASSISTANT

## 2020-07-03 PROCEDURE — 96376 TX/PRO/DX INJ SAME DRUG ADON: CPT

## 2020-07-03 PROCEDURE — 80053 COMPREHEN METABOLIC PANEL: CPT

## 2020-07-03 PROCEDURE — 85025 COMPLETE CBC W/AUTO DIFF WBC: CPT

## 2020-07-03 PROCEDURE — 96375 TX/PRO/DX INJ NEW DRUG ADDON: CPT

## 2020-07-03 RX ORDER — TIZANIDINE 4 MG/1
4 TABLET ORAL 2 TIMES DAILY
Status: DISCONTINUED | OUTPATIENT
Start: 2020-07-04 | End: 2020-07-07 | Stop reason: HOSPADM

## 2020-07-03 RX ORDER — MORPHINE SULFATE 4 MG/ML
4 INJECTION, SOLUTION INTRAMUSCULAR; INTRAVENOUS ONCE
Status: COMPLETED | OUTPATIENT
Start: 2020-07-03 | End: 2020-07-03

## 2020-07-03 RX ORDER — ACETAMINOPHEN 325 MG/1
650 TABLET ORAL EVERY 6 HOURS PRN
Status: DISCONTINUED | OUTPATIENT
Start: 2020-07-03 | End: 2020-07-07 | Stop reason: HOSPADM

## 2020-07-03 RX ORDER — 0.9 % SODIUM CHLORIDE 0.9 %
1000 INTRAVENOUS SOLUTION INTRAVENOUS ONCE
Status: COMPLETED | OUTPATIENT
Start: 2020-07-03 | End: 2020-07-03

## 2020-07-03 RX ORDER — DICYCLOMINE HYDROCHLORIDE 10 MG/1
10 CAPSULE ORAL 3 TIMES DAILY PRN
Status: DISCONTINUED | OUTPATIENT
Start: 2020-07-03 | End: 2020-07-07 | Stop reason: HOSPADM

## 2020-07-03 RX ORDER — ONDANSETRON 2 MG/ML
4 INJECTION INTRAMUSCULAR; INTRAVENOUS EVERY 6 HOURS PRN
Status: DISCONTINUED | OUTPATIENT
Start: 2020-07-03 | End: 2020-07-07 | Stop reason: HOSPADM

## 2020-07-03 RX ORDER — LEVETIRACETAM 500 MG/1
1000 TABLET ORAL 2 TIMES DAILY
Status: DISCONTINUED | OUTPATIENT
Start: 2020-07-04 | End: 2020-07-04

## 2020-07-03 RX ORDER — ATENOLOL 25 MG/1
25 TABLET ORAL DAILY
Status: DISCONTINUED | OUTPATIENT
Start: 2020-07-04 | End: 2020-07-07 | Stop reason: HOSPADM

## 2020-07-03 RX ORDER — LORAZEPAM 1 MG/1
1 TABLET ORAL 2 TIMES DAILY PRN
Status: DISCONTINUED | OUTPATIENT
Start: 2020-07-03 | End: 2020-07-07 | Stop reason: HOSPADM

## 2020-07-03 RX ORDER — GABAPENTIN 400 MG/1
800 CAPSULE ORAL 3 TIMES DAILY
Status: DISCONTINUED | OUTPATIENT
Start: 2020-07-04 | End: 2020-07-07 | Stop reason: HOSPADM

## 2020-07-03 RX ORDER — ACETAMINOPHEN 650 MG/1
650 SUPPOSITORY RECTAL EVERY 6 HOURS PRN
Status: DISCONTINUED | OUTPATIENT
Start: 2020-07-03 | End: 2020-07-07 | Stop reason: HOSPADM

## 2020-07-03 RX ORDER — PANTOPRAZOLE SODIUM 40 MG/1
40 TABLET, DELAYED RELEASE ORAL
Status: DISCONTINUED | OUTPATIENT
Start: 2020-07-04 | End: 2020-07-04

## 2020-07-03 RX ORDER — FENTANYL CITRATE 50 UG/ML
50 INJECTION, SOLUTION INTRAMUSCULAR; INTRAVENOUS
Status: DISCONTINUED | OUTPATIENT
Start: 2020-07-03 | End: 2020-07-03

## 2020-07-03 RX ORDER — MULTIVIT-MIN/FERROUS GLUCONATE 9 MG/15 ML
15 LIQUID (ML) ORAL DAILY
Status: DISCONTINUED | OUTPATIENT
Start: 2020-07-04 | End: 2020-07-07 | Stop reason: HOSPADM

## 2020-07-03 RX ORDER — PROMETHAZINE HYDROCHLORIDE 25 MG/ML
25 INJECTION, SOLUTION INTRAMUSCULAR; INTRAVENOUS ONCE
Status: COMPLETED | OUTPATIENT
Start: 2020-07-03 | End: 2020-07-03

## 2020-07-03 RX ORDER — LEVOTHYROXINE SODIUM 0.12 MG/1
125 TABLET ORAL DAILY
Status: DISCONTINUED | OUTPATIENT
Start: 2020-07-04 | End: 2020-07-07 | Stop reason: HOSPADM

## 2020-07-03 RX ORDER — LACOSAMIDE 50 MG/1
50 TABLET ORAL 2 TIMES DAILY
Status: DISCONTINUED | OUTPATIENT
Start: 2020-07-04 | End: 2020-07-04

## 2020-07-03 RX ORDER — OXYCODONE HYDROCHLORIDE AND ACETAMINOPHEN 5; 325 MG/1; MG/1
1 TABLET ORAL EVERY 4 HOURS PRN
Status: DISCONTINUED | OUTPATIENT
Start: 2020-07-03 | End: 2020-07-06 | Stop reason: SDUPTHER

## 2020-07-03 RX ORDER — SODIUM CHLORIDE 0.9 % (FLUSH) 0.9 %
10 SYRINGE (ML) INJECTION 2 TIMES DAILY
Status: DISCONTINUED | OUTPATIENT
Start: 2020-07-04 | End: 2020-07-07 | Stop reason: HOSPADM

## 2020-07-03 RX ORDER — POLYETHYLENE GLYCOL 3350 17 G/17G
17 POWDER, FOR SOLUTION ORAL DAILY PRN
Status: DISCONTINUED | OUTPATIENT
Start: 2020-07-03 | End: 2020-07-07 | Stop reason: HOSPADM

## 2020-07-03 RX ORDER — PROMETHAZINE HYDROCHLORIDE 25 MG/ML
12.5 INJECTION, SOLUTION INTRAMUSCULAR; INTRAVENOUS EVERY 6 HOURS PRN
Status: DISCONTINUED | OUTPATIENT
Start: 2020-07-03 | End: 2020-07-07 | Stop reason: HOSPADM

## 2020-07-03 RX ORDER — ONDANSETRON 2 MG/ML
4 INJECTION INTRAMUSCULAR; INTRAVENOUS EVERY 30 MIN PRN
Status: DISCONTINUED | OUTPATIENT
Start: 2020-07-03 | End: 2020-07-03

## 2020-07-03 RX ORDER — SODIUM CHLORIDE 0.9 % (FLUSH) 0.9 %
10 SYRINGE (ML) INJECTION PRN
Status: DISCONTINUED | OUTPATIENT
Start: 2020-07-03 | End: 2020-07-07 | Stop reason: HOSPADM

## 2020-07-03 RX ADMIN — FENTANYL CITRATE 50 MCG: 50 INJECTION INTRAMUSCULAR; INTRAVENOUS at 19:13

## 2020-07-03 RX ADMIN — FENTANYL CITRATE 50 MCG: 50 INJECTION INTRAMUSCULAR; INTRAVENOUS at 17:07

## 2020-07-03 RX ADMIN — ONDANSETRON 4 MG: 2 INJECTION INTRAMUSCULAR; INTRAVENOUS at 15:55

## 2020-07-03 RX ADMIN — SODIUM CHLORIDE 1000 ML: 9 INJECTION, SOLUTION INTRAVENOUS at 15:54

## 2020-07-03 RX ADMIN — MORPHINE SULFATE 4 MG: 4 INJECTION, SOLUTION INTRAMUSCULAR; INTRAVENOUS at 15:54

## 2020-07-03 RX ADMIN — PROMETHAZINE HYDROCHLORIDE 25 MG: 25 INJECTION INTRAMUSCULAR; INTRAVENOUS at 17:06

## 2020-07-03 RX ADMIN — ONDANSETRON 4 MG: 2 INJECTION INTRAMUSCULAR; INTRAVENOUS at 18:11

## 2020-07-03 ASSESSMENT — PAIN SCALES - GENERAL
PAINLEVEL_OUTOF10: 8
PAINLEVEL_OUTOF10: 7
PAINLEVEL_OUTOF10: 8
PAINLEVEL_OUTOF10: 9
PAINLEVEL_OUTOF10: 10
PAINLEVEL_OUTOF10: 9

## 2020-07-03 ASSESSMENT — PAIN DESCRIPTION - PAIN TYPE
TYPE: ACUTE PAIN
TYPE: ACUTE PAIN

## 2020-07-03 ASSESSMENT — PAIN DESCRIPTION - LOCATION
LOCATION: ABDOMEN
LOCATION: ABDOMEN;HEAD

## 2020-07-03 NOTE — ED NOTES
Bed: ED-34  Expected date:   Expected time:   Means of arrival:   Comments:  Triage pt     Thao Richard RN  07/03/20 4299

## 2020-07-03 NOTE — ED PROVIDER NOTES
12 lead EKG per my interpretation:  Sinus Bradycardia 51  Axis is   Normal  QTc is  401  There is no specific T wave changes appreciated. There is no specific ST wave changes appreciated.     Prior EKG to compare with was not available        Rangel Mccray DO  07/03/20 1900

## 2020-07-03 NOTE — CONSULTS
Consult completed. POC discussed with [facility], CYRIL, who reports PIV will meet pt's therapeutic needs & lab hasn't been able to draw blood. Procedure/rationale explained to pt & consent obtained. #22ga [employer] high-pressure-injectable PIV initiated to LUE anterior proximal medial forearm using sterile, UltraSound-guided technique and lab specimens collected/sent. Sterile dressing with SkinPrep & SwabCap applied. Pt tolerated well and Ana Rosa Ureña, Primary RN, & [facility].

## 2020-07-03 NOTE — ED NOTES
Pt updated on plan of waiting on PICC team for labs and pain medication.       Willie Lubin RN  07/03/20 4615

## 2020-07-03 NOTE — ED NOTES
PIV attempted x2 with no success. CYRIL Menchaca notified and PICC consult to be placed.       Jaimee Riggins RN  07/03/20 8720

## 2020-07-03 NOTE — ED PROVIDER NOTES
Patient Identification  Ann Marie Allen is a 44 y.o. female    Chief Complaint  Abdominal Pain and Emesis      HPI  (History provided by patient)  This is a 44 y.o. female who was brought in by self for chief complaint of abdominal pain and emesis. Has history of chronic pancreatitis. States that she always has abdominal pain, is been worse over the last several days. Notes that she has been vomiting for the last few days and has been unable to take her home pain medication. Thinks this may be related to getting tooth pulled on Tuesday and starting amoxicillin at this time. States that she feels lightheaded. Abdominal pain is epigastric, 8 out of 10, burning. Radiates into lower anterior chest.  Denies any shortness of breath, fevers, coughing. REVIEW OF SYSTEMS    Constitutional:  Denies fever, chills  HENT:  Denies sore throat or ear pain   Eyes: Denies vision changes, eye pain  Cardiovascular:  Denies chest pain, syncope  Respiratory:  Denies shortness of breath, cough   GI:  + abdominal pain, nausea, vomiting  :  Denies dysuria, discharge  Musculoskeletal:  Denies back pain, joint pain  Skin:  Denies rash, pruritis  Neurologic:  Denies headache, focal weakness, or sensory changes     See HPI and nursing notes for additional information     I have reviewed the following nursing documentation:  Allergies:    Allergies   Allergen Reactions    Aspirin Palpitations     \"My Heart Rate Elevates\"    Shellfish-Derived Products Swelling    Toradol [Ketorolac Tromethamine] Rash    Compazine [Prochlorperazine]     Reglan [Metoclopramide] Itching       Past medical history:  has a past medical history of Acid reflux, Anemia, Anxiety, Arthritis, Bleeding ulcer (2014), Bronchitis (Last Episode 2014), Chronic back pain, Chronic pain, COPD (chronic obstructive pulmonary disease) ([facility] [address] Depression, Fibromyalgia (Dx 2013), H/O echocardiogram (8/11/15), H/O echocardiogram (08/30/2018), Hiatal hernia, History of blood transfusion (2015 And 2018), [place] (hard of hearing), cardiac catheterization (10/24/2010), transesophageal echocardiography (PILO) for monitoring (2010), OTHER MEDICAL, OTHER MEDICAL, Hypertension, Lupus ([facility] [address] (Dx ), MDRO (multiple drug resistant organisms) resistance, Morbid obesity ([facility] [address] MRSA bacteremia, Nausea, Pain management, Pancreatitis chronic (Dx ), Pneumonia (Dx -15), Shortness of breath on exertion, Shortness of breath on exertion, Sleep apnea, Staph infection (Dx -15), Staph infection (Dx -15), Teeth missing, Thyroid disease, UTI (urinary tract infection) (In Past), and Wears glasses. Past surgical history:  has a past surgical history that includes Lung removal, partial (Left, );  section (1991); Foot surgery (Left, Last Done In ); Dental surgery; Cholecystectomy, laparoscopic (4085'K); other surgical history (1998); other surgical history (Last Done 7-15-16); Tonsillectomy and adenoidectomy (); Appendectomy (1998); Upper gastrointestinal endoscopy (2018); Lap Band (~); Hysterectomy (10/1997); Endoscopy, colon, diagnostic (Last Done In ); Colonoscopy (Last Done In 's); Cardiac catheterization (10/24/2010); Sleeve Gastrectomy (N/A, 2019); hiatal hernia repair (N/A, 2019); Upper gastrointestinal endoscopy (N/A, 2019); Foot Debridement (Left, 2019); Upper gastrointestinal endoscopy (N/A, 2019); Catheter Removal (N/A, 2019); Upper gastrointestinal endoscopy (N/A, 2019); INSERTION / REMOVAL / REPLACEMENT VENOUS ACCESS CATHETER (N/A, 8/15/2019); Upper gastrointestinal endoscopy (N/A, 10/14/2019); and Port Surgery (N/A, 1/15/2020). Home medications:   Prior to Admission medications    Medication Sig Start Date End Date Taking? Authorizing Provider   LORazepam (ATIVAN) 1 MG tablet Take 1 mg by mouth as needed for Anxiety.     Historical Provider, MD   promethazine (PHENERGAN) 25 MG tablet Take 1 tablet by mouth every 6 hours as needed for Nausea 6/8/20   Kumar Guillen MD   pantoprazole (PROTONIX) 40 MG tablet Take 1 tablet by mouth every morning (before breakfast) 5/6/20   Kumar Guillen MD   dicyclomine (BENTYL) 10 MG capsule Take 1 capsule by mouth 3 times daily as needed (abdominal pain) 4/13/20   [id number] Wiliam Chavez PA-C   levETIRAcetam (KEPPRA) 1000 MG tablet Take 1 tablet by mouth 2 times daily 10/3/19   Mauricio Andres MD   lacosamide (VIMPAT) 50 MG TABS tablet Take 1 tablet by mouth 2 times daily for 14 days. 10/3/19 10/17/19  Mauricio Andres MD   oxyCODONE-acetaminophen (PERCOCET) 5-325 MG per tablet Take 1 tablet by mouth. Every 4-6 hours PRN    Historical Provider, MD   ondansetron (ZOFRAN ODT) 4 MG disintegrating tablet Take 1 tablet by mouth every 8 hours as needed for Nausea or Vomiting 7/23/19   Mauricio Andres MD   Cholecalciferol (VITAMIN D3) 5000 units TABS Take 1 tablet by mouth daily    Historical Provider, MD   tiZANidine (ZANAFLEX) 4 MG tablet Take 4 mg by mouth 2 times daily    Historical Provider, MD   estradiol (ESTRACE) 0.5 MG tablet Take 0.5 mg by mouth daily    Historical Provider, MD   atenolol (TENORMIN) 25 MG tablet Take 25 mg by mouth daily    Historical Provider, MD   Multiple Vitamin (MVI, BARIATRIC ADVANTAGE MULTI-FORMULA, CHEW TAB) Take 1 tablet by mouth daily 2/22/19   Kumar Guillen MD   Calcium Citrate-Vitamin D (CALCIUM + VIT D, BARIATRIC ADVANTAGE, CHEWABLE TABLET) Take 1 tablet by mouth 2 times daily 2/22/19   Kumar Guillen MD   levothyroxine (SYNTHROID) 125 MCG tablet Take 1 tablet by mouth Daily  Patient taking differently: Take 125 mcg by mouth nightly  8/8/18   Veronica Gunter MD   gabapentin (NEURONTIN) 800 MG tablet Take 800 mg by mouth 3 times daily    Historical Provider, MD   PARoxetine (PAXIL) 30 MG tablet Take 50 mg by mouth nightly     Historical Provider, MD       Social history:  reports that she has never smoked.  She has never used smokeless tobacco. She reports that she does not drink alcohol or use drugs. Family history:    Family History   Problem Relation Age of Onset    Diabetes Mother         \"Borderline Diabetes\"    High Blood Pressure Mother     Obesity Mother     Arthritis Mother     Heart Disease Mother     High Cholesterol Mother     Vision Loss Mother     Diabetes Father     High Blood Pressure Father     Asthma Father     Cancer Father         prostate cancer    High Blood Pressure Brother     Asthma Son     Vision Loss Son     Lupus Daughter     Other Daughter         \"Alot Of Female Problems\"         Exam  BP (!) 150/72   Pulse 64   Temp 98.1 °F (36.7 °C) (Oral)   Resp 16   Ht 5' 4\" (1.626 m)   Wt 255 lb (115.7 kg)   SpO2 99%   BMI 43.77 kg/m²   Nursing note and vitals reviewed. Constitutional: Well developed, well nourished. No acute distress. HENT:      Head: Normocephalic and atraumatic. Ears: External ears normal.      Nose: Nose normal.     Mouth: Membrane mucosa moist and pink. No posterior oropharynx erythema or tonsillar edema  Eyes: Anicteric sclera. No discharge, PERRL  Neck: Supple. Trachea midline. Cardiovascular: RRR, no murmurs, rubs, or gallops, radial pulses 2+ bilaterally. Pulmonary/Chest: Effort normal. No respiratory distress. CTAB. No stridor. No wheezes. No rales. Abdominal: Soft. TTP in epigastric and RUQ regions. No distension. No guarding, rebound tenderness, or evidence of ascites. : No CVA tenderness. Musculoskeletal: Moves all extremities. No gross deformity. Neurological: Alert and oriented to person, place, and time. Normal muscle tone. Skin: Warm and dry. No rash. Psychiatric: Normal mood and affect. Behavior is normal.    Procedures      EKG   Please see Dr. Nurys Glez note for EKG read.       Radiographs (if obtained):  [] The following radiograph was interpreted by myself in the absence of a radiologist:   [x] Radiologist's Report Reviewed:  CT ABDOMEN PELVIS WO CONTRAST   Final Result   No evidence of acute process in the abdomen or pelvis. XR CHEST PORTABLE   Final Result   No radiographic evidence of acute pulmonary disease. Cardiomegaly.                 Labs  Results for orders placed or performed during the hospital encounter of 07/03/20   Urinalysis   Result Value Ref Range    Color, UA YELLOW YELLOW    Clarity, UA CLEAR CLEAR    Glucose, Urine NEGATIVE NEGATIVE MG/DL    Bilirubin Urine NEGATIVE NEGATIVE MG/DL    Ketones, Urine NEGATIVE NEGATIVE MG/DL    Specific Gravity, UA 1.017 1.001 - 1.035    Blood, Urine NEGATIVE NEGATIVE    pH, Urine 6.0 5.0 - 8.0    Protein, UA NEGATIVE NEGATIVE MG/DL    Urobilinogen, Urine NORMAL 0.2 - 1.0 MG/DL    Nitrite Urine, Quantitative NEGATIVE NEGATIVE    Leukocyte Esterase, Urine NEGATIVE NEGATIVE    RBC, UA NONE SEEN 0 - 6 /HPF    WBC, UA <1 0 - 5 /HPF    Bacteria, UA RARE (A) NEGATIVE /HPF    Squam Epithel, UA 3 /HPF    Mucus, UA RARE (A) NEGATIVE HPF    Trichomonas, UA NONE SEEN NONE SEEN /HPF   CBC auto diff   Result Value Ref Range    WBC 5.8 4.0 - 10.5 K/CU MM    RBC 4.78 4.2 - 5.4 M/CU MM    Hemoglobin 12.8 12.5 - 16.0 GM/DL    Hematocrit 40.2 37 - 47 %    MCV 84.1 78 - 100 FL    MCH 26.8 (L) 27 - 31 PG    MCHC 31.8 (L) 32.0 - 36.0 %    RDW 14.2 11.7 - 14.9 %    Platelets 554 343 - 305 K/CU MM    MPV 10.0 7.5 - 11.1 FL    Differential Type AUTOMATED DIFFERENTIAL     Segs Relative 49.7 36 - 66 %    Lymphocytes % 39.3 24 - 44 %    Monocytes % 5.7 (H) 0 - 4 %    Eosinophils % 4.1 (H) 0 - 3 %    Basophils % 0.9 0 - 1 %    Segs Absolute 2.9 K/CU MM    Lymphocytes Absolute 2.3 K/CU MM    Monocytes Absolute 0.3 K/CU MM    Eosinophils Absolute 0.2 K/CU MM    Basophils Absolute 0.1 K/CU MM    Nucleated RBC % 0.0 %    Total Nucleated RBC 0.0 K/CU MM    Total Immature Neutrophil 0.02 K/CU MM    Immature Neutrophil % 0.3 0 - 0.43 %   CMP   Result Value Ref Range    Sodium 135 135 - 145 MMOL/L Potassium 4.1 3.5 - 5.1 MMOL/L    Chloride 101 99 - 110 mMol/L    CO2 24 21 - 32 MMOL/L    BUN 16 6 - 23 MG/DL    CREATININE 0.8 0.6 - 1.1 MG/DL    Glucose 89 70 - 99 MG/DL    Calcium 10.6 8.3 - 10.6 MG/DL    Alb 4.9 3.4 - 5.0 GM/DL    Total Protein 8.2 6.4 - 8.2 GM/DL    Total Bilirubin 0.3 0.0 - 1.0 MG/DL    ALT 23 10 - 40 U/L    AST 29 15 - 37 IU/L    Alkaline Phosphatase 120 40 - 129 IU/L    GFR Non-African American >60 >60 mL/min/1.73m2    GFR African American >60 >60 mL/min/1.73m2    Anion Gap 10 4 - 16   Lipase   Result Value Ref Range    Lipase 20 13 - 60 IU/L   Troponin   Result Value Ref Range    Troponin T <0.010 <0.01 NG/ML         MDM  Patient presents for epigastric pain and vomiting. Pain seems to be chronic, exacerbated by not being able to take her routine pain meds. Given Zofran x 2 and phenergan in ED and continues to vomit. Allergic to compazine and reglan. Workup here is unremarkable. Plan is to admit for intractable vomiting. Consult placed to Dr. Mike Smith who agreed to admit. In consideration of current COVID19 pandemic, with effort to minimize unnecessary provider exposure, this patient was seen at bedside by me independently. However, in compliance with current hospital KEN/ED protocol, prior to admission I did discuss this patient case with emergency department physician, Dr. Maxime Jones. Of note, this Pt was NOT admitted to the ICU. I have independently evaluated this patient. Final Impression  1. Intractable vomiting with nausea, unspecified vomiting type    2. Chronic abdominal pain        Blood pressure (!) 150/72, pulse 64, temperature 98.1 °F (36.7 °C), temperature source Oral, resp. rate 16, height 5' 4\" (1.626 m), weight 255 lb (115.7 kg), SpO2 99 %, not currently breastfeeding. Disposition:  Admit to med/surg floor in stable condition. Patient was given scripts for the following medications. I counseled patient how to take these medications.      New Prescriptions    No medications on file       This chart was generated using the [address] [place] [address] dictation system. I created this record but it may contain dictation errors given the limitations of this technology.        Josefa Posada PA-C  07/03/20 2011

## 2020-07-03 NOTE — ED PROVIDER NOTES
As physician-in-triage, I performed a medical screening history and physical exam on this patient. I also cared for and evaluated the patient in conjunction with the ED Advanced Practice Provider    HISTORY Jose Stewart is a 44 y.o. female who presents to the emergency department with complaints of nausea vomiting and abdominal pain. The patient has a history of chronic pancreatitis. She states that she has been having abdominal pain for the past 2 days. She states she was started on amoxicillin on Tuesday secondary to a tooth that was pulled. She has pain medicines at home but states she is vomiting them. She states she is a very difficult stick and is scheduled to have her Mediport replaced next Tuesday. She denies diarrhea. She denies chest pain but states she feels very woozy and somewhat \"disoriented\". She attributes this to possible dehydration. Conchetta Peaks PHYSICAL EXAM  /86   Pulse 61   Temp 98.3 °F (36.8 °C) (Oral)   Resp 17   SpO2 97%     On exam, the patient appears in no acute distress. She appears uncomfortable speech is clear. Breathing is unlabored. Moves all extremities. This evaluation was made via telemedicine    All diagnostic, treatment, and disposition decisions were made by myself in conjunction with the advanced practice provider. For all further details of the patient's emergency department visit, please see the advanced practice provider's documentation. Comment: Please note this report has been produced using speech recognition software and may contain errors related to that system including errors in grammar, punctuation, and spelling, as well as words and phrases that may be inappropriate. If there are any questions or concerns please feel free to contact the dictating provider for clarification.         Salo Price, DO  07/03/20 4309

## 2020-07-04 LAB
GLUCOSE BLD-MCNC: 91 MG/DL (ref 70–99)
GLUCOSE BLD-MCNC: 95 MG/DL (ref 70–99)

## 2020-07-04 PROCEDURE — 93010 ELECTROCARDIOGRAM REPORT: CPT | Performed by: INTERNAL MEDICINE

## 2020-07-04 PROCEDURE — 6360000002 HC RX W HCPCS: Performed by: GENERAL PRACTICE

## 2020-07-04 PROCEDURE — 94761 N-INVAS EAR/PLS OXIMETRY MLT: CPT

## 2020-07-04 PROCEDURE — 2580000003 HC RX 258: Performed by: GENERAL PRACTICE

## 2020-07-04 PROCEDURE — 1200000000 HC SEMI PRIVATE

## 2020-07-04 PROCEDURE — 82962 GLUCOSE BLOOD TEST: CPT

## 2020-07-04 PROCEDURE — C9113 INJ PANTOPRAZOLE SODIUM, VIA: HCPCS | Performed by: GENERAL PRACTICE

## 2020-07-04 RX ORDER — PANTOPRAZOLE SODIUM 40 MG/10ML
40 INJECTION, POWDER, LYOPHILIZED, FOR SOLUTION INTRAVENOUS 2 TIMES DAILY
Status: DISCONTINUED | OUTPATIENT
Start: 2020-07-04 | End: 2020-07-07 | Stop reason: HOSPADM

## 2020-07-04 RX ORDER — SODIUM CHLORIDE 9 MG/ML
INJECTION, SOLUTION INTRAVENOUS CONTINUOUS
Status: DISCONTINUED | OUTPATIENT
Start: 2020-07-04 | End: 2020-07-07 | Stop reason: HOSPADM

## 2020-07-04 RX ADMIN — SODIUM CHLORIDE, PRESERVATIVE FREE 10 ML: 5 INJECTION INTRAVENOUS at 12:48

## 2020-07-04 RX ADMIN — PANTOPRAZOLE SODIUM 40 MG: 40 INJECTION, POWDER, FOR SOLUTION INTRAVENOUS at 12:48

## 2020-07-04 RX ADMIN — ONDANSETRON HYDROCHLORIDE 4 MG: 2 SOLUTION INTRAMUSCULAR; INTRAVENOUS at 00:45

## 2020-07-04 RX ADMIN — ENOXAPARIN SODIUM 40 MG: 40 INJECTION SUBCUTANEOUS at 12:07

## 2020-07-04 RX ADMIN — Medication 0.5 MG: at 07:01

## 2020-07-04 RX ADMIN — Medication 0.5 MG: at 00:45

## 2020-07-04 RX ADMIN — SODIUM CHLORIDE, PRESERVATIVE FREE 10 ML: 5 INJECTION INTRAVENOUS at 10:48

## 2020-07-04 RX ADMIN — PROMETHAZINE HYDROCHLORIDE 12.5 MG: 25 INJECTION INTRAMUSCULAR; INTRAVENOUS at 10:48

## 2020-07-04 RX ADMIN — SODIUM CHLORIDE: 9 INJECTION, SOLUTION INTRAVENOUS at 12:06

## 2020-07-04 RX ADMIN — SODIUM CHLORIDE, PRESERVATIVE FREE 10 ML: 5 INJECTION INTRAVENOUS at 00:46

## 2020-07-04 RX ADMIN — ONDANSETRON HYDROCHLORIDE 4 MG: 2 SOLUTION INTRAMUSCULAR; INTRAVENOUS at 07:01

## 2020-07-04 RX ADMIN — Medication 0.5 MG: at 04:02

## 2020-07-04 RX ADMIN — Medication 0.5 MG: at 10:48

## 2020-07-04 ASSESSMENT — PAIN DESCRIPTION - FREQUENCY
FREQUENCY: CONTINUOUS
FREQUENCY: CONTINUOUS

## 2020-07-04 ASSESSMENT — PAIN SCALES - GENERAL
PAINLEVEL_OUTOF10: 7
PAINLEVEL_OUTOF10: 7
PAINLEVEL_OUTOF10: 8
PAINLEVEL_OUTOF10: 3
PAINLEVEL_OUTOF10: 7
PAINLEVEL_OUTOF10: 7
PAINLEVEL_OUTOF10: 6

## 2020-07-04 ASSESSMENT — PAIN DESCRIPTION - LOCATION
LOCATION: ABDOMEN;HEAD
LOCATION: ABDOMEN;HEAD
LOCATION: ABDOMEN
LOCATION: ABDOMEN

## 2020-07-04 ASSESSMENT — PAIN DESCRIPTION - ONSET
ONSET: ON-GOING
ONSET: ON-GOING

## 2020-07-04 ASSESSMENT — PAIN DESCRIPTION - DESCRIPTORS
DESCRIPTORS: ACHING
DESCRIPTORS: ACHING

## 2020-07-04 ASSESSMENT — PAIN - FUNCTIONAL ASSESSMENT: PAIN_FUNCTIONAL_ASSESSMENT: PREVENTS OR INTERFERES SOME ACTIVE ACTIVITIES AND ADLS

## 2020-07-04 ASSESSMENT — PAIN DESCRIPTION - PAIN TYPE
TYPE: ACUTE PAIN

## 2020-07-04 NOTE — ED NOTES
Report given to [facility]. No further questions at this time.       Crow Bennett RN  07/03/20 [id number]

## 2020-07-04 NOTE — PROGRESS NOTES
Nutrition Assessment (Low Risk)    Type and Reason for Visit: Initial, Positive Nutrition Screen    Nutrition Assessment: Pt fell out due to a positive screen for weight loss. Pt has had lap band surgery but her weight has been stable for the past year and does not have any edema and doesn't have any wounds. Pt is at low nutritional risk at this time.     Malnutrition Assessment:  · Malnutrition Status: No malnutrition    Nutrition Risk Level   Risk Level: Low    Nutrition Diagnosis:   · Problem: No nutrition diagnosis at this time    Nutrition Intervention:  Food and/or Delivery: Continue current diet  Nutrition Education/Counseling/Coordination of Care:  Continued Inpatient Monitoring, Coordination of Care      Electronically signed by Uche Vidal RD, LD on 6/6/23 at 11:10 AM EDT    Contact Number: [phone]

## 2020-07-04 NOTE — PROGRESS NOTES
Patient states she her IV is infiltrated. As I walked in the room the patient was pulling at the site. Informed the RN.    Earl Marrero PCA

## 2020-07-04 NOTE — H&P
[address] [place] [address], [address] [place] [address]                              HISTORY AND PHYSICAL    PATIENT NAME: Anastacio Torres                  :        1970  MED REC NO:   [id number]                          ROOM:       4117  ACCOUNT NO:   [de-identified]                           ADMIT DATE: 2020  PROVIDER:     Ofelia Jones    REASON FOR ADMISSION:  Intractable nausea and vomiting. HISTORY OF PRESENT ILLNESS:  The patient is a 47-year-old female who has  a history of morbid obesity, status post gastric surgery, also has a  history of chronic pancreatitis. The patient has chronic pain on and  off, for which the patient is taking Percocet; however, the patient  started having complaint of vomiting for last few days and continued to  get worse. Because of that, she came to the emergency room. The  patient has had a history of recent tooth infection, for which the  patient had tooth pulled and she was on amoxicillin. The patient had  some complaint of dizziness. She had abdominal pain, which has improved  at this time. The patient denies any history of chest pain at this  time. No shortness of breath. No fever or chills. The patient denies  any urinary complaints. REVIEW OF SYSTEMS:  CONSTITUTIONAL:  Negative for fever or chills. HENT:  Negative for any sore throat or earache. EYES:  Negative for any vision change. CARDIOVASCULAR:  No chest pain or palpitation. RS:  No cough or shortness of breath. GI:  As per HPI. :  Negative for urinary complaints. MUSCULOSKELETAL:  Negative. SKIN:  Negative. NEUROLOGIC:  No headache or focal weakness. ALLERGY/IMMUNOLOGY:  Negative. ALLERGIES:  The patient is allergic to ASPIRIN, SHELLFISH, TORADOL,  COMPAZINE, and REGLAN.     PAST MEDICAL HISTORY:  Significant for acid reflux, anemia, anxiety,  arthritis, bleeding ulcers, bronchitis, chronic back pain, COPD,  depression, fibromyalgia, hypertension, morbid obesity, MRSA,  bacteremia, chronic pancreatitis, pneumonia, sleep apnea, thyroid  disease, and UTI. PAST SURGICAL HISTORY:  Significant for , cholecystectomy,  tonsillectomy, appendectomy, upper GI endoscopy, cardiac cath, sleeve  gastrectomy, hiatal hernia repair. HOME MEDICATIONS:  Include Ativan 1 mg daily as needed for anxiety,  Phenergan 25 mg one tablet every six hours as needed for nausea,  Protonix 40 mg one tablet every morning, dicyclomine 10 mg one tablet  three times a day as needed, Keppra 1000 mg two times a day, which she  is not taking, also Vimpat 50 mg one tablet two times a day, which she  is not taking, oxycodone one tablet every six hours as needed, Zofran  one tablet every eight hours as needed, vitamin D 35,000 units one  tablet daily, Zanaflex 4 mg two times a day, Estrace 0.5 mg daily,  Tenormin 25 mg daily, multivitamin once a day, calcium with vitamin D  one tablet two times a day, Synthroid 125 mcg daily, Neurontin 800 mg  three times a day, and Paxil 30 mg daily. SOCIAL HISTORY:  She does not have any history of smoking, alcohol, or  drug use. FAMILY HISTORY:  Significant for diabetes, high blood pressure, obesity,  arthritis, heart disease, asthma, and lupus. PHYSICAL EXAMINATION:  GENERAL:  Shows that she is not in distress. VITAL SIGNS:  Shows temperature is 98.4, pulse 61, respiratory rate is  14, the latest BP is 131/70, O2 is 98%. HEENT:  The patient has normocephalic head. No sinus tenderness. NECK:  Without any stiffness, thyromegaly. RS:  The patient has a fair air entry. No wheezing or rhonchi. CARDIOVASCULAR SYSTEM:  S1 and S2 present. Rhythm and rhythm are  regular. ABDOMEN:  Soft. The patient has epigastric tenderness. Obese abdomen. Bowel sounds are present and they are normal.  EXTREMITIES:  Without any clubbing, cyanosis, or edema.   CNS:  Nonfocal.    INVESTIGATIONS:  The patient's urine drug screen is negative other than  oxycodone, which she takes on a regular basis. Troponin T is less than  0.010. CMP is within normal limits. Lipase is 20. The patient's  urinalysis is within normal limits. The patient's CBC shows WBC count  of 5.8, hemoglobin is 12, hematocrit is 40.2. The patient's platelet  count is 885. ASSESSMENT:  1. Intractable nausea and vomiting. 2.  Chronic pancreatitis. 3.  Status post gastric sleeve surgery. 4.  Morbid obesity. 5.  Hypertension. 6.  Anxiety. PLAN:  A  this time, I will continue the patient on IV fluid. I will  give IV Protonix. I will monitor the patient's condition closely. Amylase and lipase will be rechecked tomorrow morning. The patient will  get IV pain medication as well as IV nausea and vomiting medications. The patient was supposed to get port placement on Tuesday. We will  consult Dr. Marilin Abarca if needed during this admission.         MARY CARMEN FAYE    D: 07/04/2020 12:21:47       T: 07/04/2020 12:32:36     [email or web address]  Job#: [id number]     Doc#: [id number]    CC:

## 2020-07-04 NOTE — PROGRESS NOTES
Transferred to room 74542 [phone] via wheelchair. Oriented to room and call light. Deneis needs at this time. Call light in reach.

## 2020-07-04 NOTE — FLOWSHEET NOTE
Spoke with Dr Rodolfo Minaya re: pt unable to keep fluids down and unable to take po meds. Pt requesting other pain med than the po percocet ordered. Dr gives v.o. for vimpat and keppra to be discontinued d/t pt does not take these at home. Dr Hernandez Cates asking why pt was placed on covid unit. He states he did not request pt to be place on covid unit and he will not be able to follow/see her on the covid unit. Dr Hernandez Cates states he wants her transferred to [address] [place] [address] and to talk to the supervisor about this. Pt is being tested for covid d/t impending surgery on 7/7/20 for a port placement. Called [facility] and informed her of situation and v.o. will be placed to transfer pt to [address] [place] [address].

## 2020-07-04 NOTE — FLOWSHEET NOTE
Admission skin check per priscilla joyce and Violeta Mancini. Pt has scarring to right chest from old iv port. Pt has old abd scope sites healed. . No skin breakdown noted.

## 2020-07-05 PROBLEM — Z98.84 STATUS POST BARIATRIC SURGERY: Status: RESOLVED | Noted: 2019-02-22 | Resolved: 2020-07-05

## 2020-07-05 PROBLEM — R11.14 BILIOUS VOMITING WITH NAUSEA: Status: RESOLVED | Noted: 2020-06-26 | Resolved: 2020-07-05

## 2020-07-05 PROBLEM — R13.10 DYSPHAGIA: Status: RESOLVED | Noted: 2019-03-12 | Resolved: 2020-07-05

## 2020-07-05 PROBLEM — B95.62 BACTEREMIA DUE TO METHICILLIN RESISTANT STAPHYLOCOCCUS AUREUS: Status: RESOLVED | Noted: 2020-02-01 | Resolved: 2020-07-05

## 2020-07-05 PROBLEM — R78.81 BACTEREMIA DUE TO METHICILLIN RESISTANT STAPHYLOCOCCUS AUREUS: Status: RESOLVED | Noted: 2020-02-01 | Resolved: 2020-07-05

## 2020-07-05 PROBLEM — R22.9 LOCALIZED SKIN MASS, LUMP, OR SWELLING: Status: RESOLVED | Noted: 2020-04-07 | Resolved: 2020-07-05

## 2020-07-05 PROBLEM — Z79.2 RECEIVING INTRAVENOUS ANTIBIOTIC TREATMENT AS OUTPATIENT: Status: RESOLVED | Noted: 2019-07-10 | Resolved: 2020-07-05

## 2020-07-05 PROBLEM — K92.0 HEMATEMESIS: Status: RESOLVED | Noted: 2019-09-29 | Resolved: 2020-07-05

## 2020-07-05 PROBLEM — L02.91 ABSCESS: Status: RESOLVED | Noted: 2019-03-10 | Resolved: 2020-07-05

## 2020-07-05 PROBLEM — H10.30 ACUTE CONJUNCTIVITIS: Status: RESOLVED | Noted: 2019-06-18 | Resolved: 2020-07-05

## 2020-07-05 PROBLEM — R53.83 FATIGUE: Status: RESOLVED | Noted: 2020-04-07 | Resolved: 2020-07-05

## 2020-07-05 PROBLEM — R11.2 POST-OPERATIVE NAUSEA AND VOMITING: Status: RESOLVED | Noted: 2019-02-17 | Resolved: 2020-07-05

## 2020-07-05 PROBLEM — E66.01 MORBID OBESITY WITH BMI OF 50.0-59.9, ADULT (HCC): Status: RESOLVED | Noted: 2018-03-28 | Resolved: 2020-07-05

## 2020-07-05 PROBLEM — R11.10 INTRACTABLE VOMITING: Status: RESOLVED | Noted: 2020-02-01 | Resolved: 2020-07-05

## 2020-07-05 PROBLEM — K92.0 HEMATEMESIS WITH NAUSEA: Status: RESOLVED | Noted: 2019-09-04 | Resolved: 2020-07-05

## 2020-07-05 PROBLEM — R42 DIZZINESS: Status: RESOLVED | Noted: 2020-01-09 | Resolved: 2020-07-05

## 2020-07-05 PROBLEM — R10.11 ABDOMINAL PAIN, RIGHT UPPER QUADRANT: Status: RESOLVED | Noted: 2019-07-14 | Resolved: 2020-07-05

## 2020-07-05 PROBLEM — Z98.890 POST-OPERATIVE NAUSEA AND VOMITING: Status: RESOLVED | Noted: 2019-02-17 | Resolved: 2020-07-05

## 2020-07-05 PROBLEM — Z87.898 HISTORY OF BACTEREMIA: Status: RESOLVED | Noted: 2019-07-14 | Resolved: 2020-07-05

## 2020-07-05 LAB
REASON FOR REJECTION: NORMAL
REJECTED TEST: NORMAL
SARS-COV-2: NOT DETECTED
SOURCE: NORMAL

## 2020-07-05 PROCEDURE — 94761 N-INVAS EAR/PLS OXIMETRY MLT: CPT

## 2020-07-05 PROCEDURE — 2580000003 HC RX 258: Performed by: GENERAL PRACTICE

## 2020-07-05 PROCEDURE — 1200000000 HC SEMI PRIVATE

## 2020-07-05 PROCEDURE — C9113 INJ PANTOPRAZOLE SODIUM, VIA: HCPCS | Performed by: GENERAL PRACTICE

## 2020-07-05 PROCEDURE — 99221 1ST HOSP IP/OBS SF/LOW 40: CPT | Performed by: SURGERY

## 2020-07-05 PROCEDURE — 36415 COLL VENOUS BLD VENIPUNCTURE: CPT

## 2020-07-05 PROCEDURE — 6370000000 HC RX 637 (ALT 250 FOR IP): Performed by: GENERAL PRACTICE

## 2020-07-05 PROCEDURE — 6360000002 HC RX W HCPCS: Performed by: GENERAL PRACTICE

## 2020-07-05 RX ADMIN — ONDANSETRON HYDROCHLORIDE 4 MG: 2 SOLUTION INTRAMUSCULAR; INTRAVENOUS at 21:11

## 2020-07-05 RX ADMIN — PANTOPRAZOLE SODIUM 40 MG: 40 INJECTION, POWDER, FOR SOLUTION INTRAVENOUS at 22:42

## 2020-07-05 RX ADMIN — SODIUM CHLORIDE: 9 INJECTION, SOLUTION INTRAVENOUS at 21:11

## 2020-07-05 RX ADMIN — Medication 0.5 MG: at 14:22

## 2020-07-05 RX ADMIN — SODIUM CHLORIDE: 9 INJECTION, SOLUTION INTRAVENOUS at 13:14

## 2020-07-05 RX ADMIN — Medication 0.5 MG: at 21:11

## 2020-07-05 RX ADMIN — ENOXAPARIN SODIUM 40 MG: 40 INJECTION SUBCUTANEOUS at 11:23

## 2020-07-05 RX ADMIN — Medication 0.5 MG: at 11:23

## 2020-07-05 RX ADMIN — PAROXETINE 50 MG: 10 TABLET, FILM COATED ORAL at 22:42

## 2020-07-05 RX ADMIN — SODIUM CHLORIDE, PRESERVATIVE FREE 10 ML: 5 INJECTION INTRAVENOUS at 22:42

## 2020-07-05 RX ADMIN — Medication 0.5 MG: at 17:58

## 2020-07-05 RX ADMIN — ONDANSETRON HYDROCHLORIDE 4 MG: 2 SOLUTION INTRAMUSCULAR; INTRAVENOUS at 14:22

## 2020-07-05 RX ADMIN — PANTOPRAZOLE SODIUM 40 MG: 40 INJECTION, POWDER, FOR SOLUTION INTRAVENOUS at 11:23

## 2020-07-05 RX ADMIN — GABAPENTIN 800 MG: 400 CAPSULE ORAL at 22:43

## 2020-07-05 RX ADMIN — PROMETHAZINE HYDROCHLORIDE 12.5 MG: 25 INJECTION INTRAMUSCULAR; INTRAVENOUS at 16:13

## 2020-07-05 RX ADMIN — TIZANIDINE 4 MG: 4 TABLET ORAL at 22:43

## 2020-07-05 ASSESSMENT — PAIN DESCRIPTION - DESCRIPTORS: DESCRIPTORS: ACHING

## 2020-07-05 ASSESSMENT — PAIN SCALES - GENERAL
PAINLEVEL_OUTOF10: 8
PAINLEVEL_OUTOF10: 7
PAINLEVEL_OUTOF10: 5
PAINLEVEL_OUTOF10: 7
PAINLEVEL_OUTOF10: 5
PAINLEVEL_OUTOF10: 6
PAINLEVEL_OUTOF10: 5
PAINLEVEL_OUTOF10: 8
PAINLEVEL_OUTOF10: 7
PAINLEVEL_OUTOF10: 8
PAINLEVEL_OUTOF10: 5

## 2020-07-05 ASSESSMENT — PAIN DESCRIPTION - PAIN TYPE: TYPE: ACUTE PAIN

## 2020-07-05 ASSESSMENT — PAIN DESCRIPTION - FREQUENCY: FREQUENCY: CONTINUOUS

## 2020-07-05 ASSESSMENT — PAIN DESCRIPTION - LOCATION: LOCATION: ABDOMEN

## 2020-07-05 NOTE — PROGRESS NOTES
Multiple attempts have been made to insert Peripheral IV. All have been unsuccessful. Awaiting PICC team response.

## 2020-07-05 NOTE — CONSULTS
SURGICAL CONSULTATION    Date of Admission:  7/3/2020  Date of Consultation:  7/5/2020    PCP:  Lynne Bishop MD  Thank you Dr. Darien Khan MD very much for your consultation, it's always appreciated. I had the privilege today to see your patient Mayte Mckenzie. Chief Complaint / History of Present Illness:  Mayte Mckenzie is a very pleasant 44 y.o. female who presents with pain in the abdomen, but with nausea, vomiting and dehydration. . recurrent. Carol Simpler and is chronic, worsening and dull compared to patient's normal condition. It is severe in intensity for a duration of 2 days, but on and off for many months now. . and is intermittent with modifying factors increased by or worse with eating. .  Had a h/o RYGB and needs an upper GI with Small bowel follow through to r/o a surgical cause of her symptoms. Carol Simpler and has Poor IV access and frequent need for IV, needs another Mediport. PMH:   has a past medical history of Acid reflux, Anemia, Anxiety, Arthritis, Bleeding ulcer (2014), Bronchitis (Last Episode 2014), Chronic back pain, Chronic pain, COPD (chronic obstructive pulmonary disease) ([facility] [address] Depression, Fibromyalgia (Dx 2013), H/O echocardiogram (8/11/15), H/O echocardiogram (08/30/2018), Hiatal hernia, History of blood transfusion (09/2015 And 2018), [place] (hard of hearing), cardiac catheterization (10/24/2010), transesophageal echocardiography (PILO) for monitoring (12/2/2010), OTHER MEDICAL, OTHER MEDICAL, Hypertension, Lupus ([address] (Dx 2013), MDRO (multiple drug resistant organisms) resistance, Morbid obesity ([address] MRSA bacteremia, Nausea, Pain management, Pancreatitis chronic (Dx 2001), Pneumonia (Dx 11-15), Shortness of breath on exertion, Shortness of breath on exertion, Sleep apnea, Staph infection (Dx 11-15), Staph infection (Dx 11-15), Teeth missing, Thyroid disease, UTI (urinary tract infection) (In Past), and Wears glasses.     PSH:   has a past surgical history that includes Lung removal, partial (Left, );  section (1991); Foot surgery (Left, Last Done In ); Dental surgery; Cholecystectomy, laparoscopic (7568'X); other surgical history (1998); other surgical history (Last Done 7-15-16); Tonsillectomy and adenoidectomy (); Appendectomy (1998); Upper gastrointestinal endoscopy (2018); Lap Band (~); Hysterectomy (10/1997); Endoscopy, colon, diagnostic (Last Done In ); Colonoscopy (Last Done In ); Cardiac catheterization (10/24/2010); Sleeve Gastrectomy (N/A, 2019); hiatal hernia repair (N/A, 2019); Upper gastrointestinal endoscopy (N/A, 2019); Foot Debridement (Left, 2019); Upper gastrointestinal endoscopy (N/A, 2019); Catheter Removal (N/A, 2019); Upper gastrointestinal endoscopy (N/A, 2019); INSERTION / REMOVAL / REPLACEMENT VENOUS ACCESS CATHETER (N/A, 8/15/2019); Upper gastrointestinal endoscopy (N/A, 10/14/2019); and Port Surgery (N/A, 1/15/2020). Allergies: Allergies   Allergen Reactions    Aspirin Palpitations     \"My Heart Rate Elevates\"    Shellfish-Derived Products Swelling    Toradol [Ketorolac Tromethamine] Rash    Compazine [Prochlorperazine]     Reglan [Metoclopramide] Itching        Home Meds:    Prior to Admission medications    Medication Sig Start Date End Date Taking? Authorizing Provider   LORazepam (ATIVAN) 1 MG tablet Take 1 mg by mouth as needed for Anxiety. Yes Historical Provider, MD   promethazine (PHENERGAN) 25 MG tablet Take 1 tablet by mouth every 6 hours as needed for Nausea 20  Yes Alberto Bains MD   pantoprazole (PROTONIX) 40 MG tablet Take 1 tablet by mouth every morning (before breakfast) 20  Yes Alberto Bains MD   dicyclomine (BENTYL) 10 MG capsule Take 1 capsule by mouth 3 times daily as needed (abdominal pain) 20  Yes [id number], [facility]   oxyCODONE-acetaminophen (PERCOCET) 5-325 MG per tablet Take 1 tablet by mouth.  Every 4-6 hours PRN   Yes Historical Provider, MD   ondansetron (ZOFRAN ODT) 4 MG disintegrating tablet Take 1 tablet by mouth every 8 hours as needed for Nausea or Vomiting 7/23/19  Yes Fareed Guerin MD   Cholecalciferol (VITAMIN D3) 5000 units TABS Take 1 tablet by mouth daily   Yes Historical Provider, MD   tiZANidine (ZANAFLEX) 4 MG tablet Take 4 mg by mouth 2 times daily   Yes Historical Provider, MD   estradiol (ESTRACE) 0.5 MG tablet Take 0.5 mg by mouth daily   Yes Historical Provider, MD   atenolol (TENORMIN) 25 MG tablet Take 25 mg by mouth daily   Yes Historical Provider, MD   Multiple Vitamin (MVI, BARIATRIC ADVANTAGE MULTI-FORMULA, CHEW TAB) Take 1 tablet by mouth daily 2/22/19  Yes Marisol Muro MD   Calcium Citrate-Vitamin D (CALCIUM + VIT D, BARIATRIC ADVANTAGE, CHEWABLE TABLET) Take 1 tablet by mouth 2 times daily 2/22/19  Yes Marisol Muro MD   levothyroxine (SYNTHROID) 125 MCG tablet Take 1 tablet by mouth Daily  Patient taking differently: Take 125 mcg by mouth nightly  8/8/18  Yes Nicolle Abdi MD   gabapentin (NEURONTIN) 800 MG tablet Take 800 mg by mouth 3 times daily   Yes Historical Provider, MD   PARoxetine (PAXIL) 30 MG tablet Take 50 mg by mouth nightly    Yes Historical Provider, MD        Hospital Meds:    Current Facility-Administered Medications   Medication Dose Route Frequency Provider Last Rate Last Dose    HYDROmorphone (DILAUDID) injection 0.5 mg  0.5 mg Intravenous Q3H PRN Reece Brambila MD   0.5 mg at 07/05/20 1123    0.9 % sodium chloride infusion   Intravenous Continuous Reece Brambila MD   Stopped at 07/04/20 1317    pantoprazole (PROTONIX) injection 40 mg  40 mg Intravenous BID Reece Brambila MD   40 mg at 07/05/20 1123    atenolol (TENORMIN) tablet 25 mg  25 mg Oral Daily Reece Brambila MD   Stopped at 07/04/20 0900    dicyclomine (BENTYL) capsule 10 mg  10 mg Oral TID PRN Reece Brambila MD        gabapentin (NEURONTIN) capsule 800 mg 800 mg Oral TID Getachew Youssef MD   Stopped at 07/04/20 1208    levothyroxine (SYNTHROID) tablet 125 mcg  125 mcg Oral Daily Reece Vargas MD        LORazepam (ATIVAN) tablet 1 mg  1 mg Oral BID PRN Reece Vargas MD        CENTRUM/CERTA-LILLY with minerals oral solution 15 mL  15 mL Oral Daily Getachew Youssef MD   Stopped at 07/04/20 0900    oxyCODONE-acetaminophen (PERCOCET) 5-325 MG per tablet 1 tablet  1 tablet Oral Q4H PRN Reece Vargas MD        PARoxetine (PAXIL) tablet 50 mg  50 mg Oral Nightly Reece Vargas MD        tiZANidine (ZANAFLEX) tablet 4 mg  4 mg Oral BID Reece Vargas MD   Stopped at 07/04/20 0900    sodium chloride flush 0.9 % injection 10 mL  10 mL Intravenous BID Reece Vargas MD   10 mL at 07/04/20 1048    sodium chloride flush 0.9 % injection 10 mL  10 mL Intravenous PRN Reece Vargas MD   10 mL at 07/04/20 1248    acetaminophen (TYLENOL) tablet 650 mg  650 mg Oral Q6H PRN Reece Vargas MD        [facility] acetaminophen (TYLENOL) suppository 650 mg  650 mg Rectal Q6H PRN Reece Vargas MD        polyethylene glycol (GLYCOLAX) packet 17 g  17 g Oral Daily PRN Reece Vargas MD        enoxaparin (LOVENOX) injection 40 mg  40 mg Subcutaneous Daily Reece Vargas MD   40 mg at 07/05/20 1123    ondansetron (ZOFRAN) injection 4 mg  4 mg Intravenous Q6H PRN Reece Vargas MD   4 mg at 07/04/20 0701    promethazine (PHENERGAN) injection 12.5 mg  12.5 mg Intravenous Q6H PRN Reece Vargas MD   12.5 mg at 07/04/20 1048      sodium chloride Stopped (07/04/20 1317)       Social History / Family History:     Social History     Socioeconomic History    Marital status:      Spouse name: None    Number of children: None    Years of education: None    Highest education level: None   Occupational History    None   Social Needs    Financial resource strain: None    Food insecurity     Worry: None     Inability: None    Transportation needs     Medical: None     Non-medical: None   Tobacco Use    Smoking status: Never Smoker    Smokeless tobacco: Never Used   Substance and Sexual Activity    Alcohol use: No     Alcohol/week: 0.0 standard drinks    Drug use: No    Sexual activity: Not Currently   Lifestyle    Physical activity     Days per week: None     Minutes per session: None    Stress: None   Relationships    Social connections     Talks on phone: None     Gets together: None     Attends Zoroastrian service: None     Active member of club or organization: None     Attends meetings of clubs or organizations: None     Relationship status: None    Intimate partner violence     Fear of current or ex partner: None     Emotionally abused: None     Physically abused: None     Forced sexual activity: None   Other Topics Concern    None   Social History Narrative    None      Family History   Problem Relation Age of Onset    Diabetes Mother         \"Borderline Diabetes\"    High Blood Pressure Mother     Obesity Mother     Arthritis Mother     Heart Disease Mother     High Cholesterol Mother     Vision Loss Mother     Diabetes Father     High Blood Pressure Father     Asthma Father     Cancer Father         prostate cancer    High Blood Pressure Brother     Asthma Son     Vision Loss Son     Lupus Daughter     Other Daughter         \"Alot Of Female Problems\"    otherwise irrelevant to this surgical issue. Review of Systems:  Constitutional: Negative for fever, chills, diaphoresis, appetite change and fatigue. HENT: Negative for sore throat, trouble swallowing and voice change. Respiratory: Negative for cough, positive for shortness of breath no wheezing. Cardiovascular: Negative for chest pain positive for SOB with one flight of stairs exertion, no pitting LE edema.    Gastrointestinal: Positive for abdominal pain, and nausea, and vomiting, and dehydration, no abdominal distention, constipation, no diarrhea, blood in stool, anal bleeding or rectal pain. Musculoskeletal: Negative for joint swelling and arthralgias. Skin: Warm and dry, well perfused. Neurological: Negative for seizures, syncope, speech difficulty and weakness. Hematological/Lymphatic: Negative for adenopathy. No history of DVT/PE. Does not bruise/bleed easily. Psychiatric/Behavioral: Negative for agitation. All others reviewed and negative. Physical Exam:  Vital Signs:   Vitals:    07/05/20 1044   BP: (!) 122/59   Pulse: 70   Resp: 16   Temp: 98.5 °F (36.9 °C)   SpO2: 94%     Body mass index is 47.15 kg/m². General appearance: Pt Alert and oriented, in no apparent acute distress. HEENT:  KATE, Conjunctiva clear. EOMI, No JVDs, Bruits, Megalies: neither thyroid nor lymph nodes. Lungs: Clear to auscultation bilaterally. Heart: Regular rate and rhythm, S1, S2 normal, no murmur, rub or gallop. Abdomen: Mildly diffusely tender. Non distended. Positive bowel sounds. No hernias noted, no masses palpable. Extremities: Warm, well perfused, no cyanosis or edema. Skin: Skin color, texture, turgor normal.  Neurologic: Grossly normal, Cranial nerves from II to XII intact, Deep tendon reflexes normal.  Lymph nodes: No palpable LNs, Cervical, groins, abdominal.  Musculoskeletal: Bilateral Upper and lower extremities ROM within normal limits. Radiologic / Imaging / TESTING  CT scan 7/3/20:  I read, and agree with radiologist:  GI/Bowel: No bowel obstruction or other non contrast acute process   demonstrated.  No evidence of colitis, diverticulitis or appendicitis.    Postsurgical changes from gastric bypass again noted.       Pelvis: No acute abnormality of the pelvis.       Peritoneum/Retroperitoneum: No free air, free fluid or abscess.       Bones/Soft Tissues: No fracture or other acute osseous process.           Impression   No evidence of acute process in the abdomen or pelvis.            Labs:    Recent Results (from the past 24 hour(s))   POCT Glucose    Collection Time: 07/04/20 12:44 PM   Result Value Ref Range    POC Glucose 95 70 - 99 MG/DL   SPECIMEN REJECTION    Collection Time: 07/05/20  9:26 AM   Result Value Ref Range    Rejected Test CMPR, CD, LIPA, THEO     Reason for Rejection NO SPECIMEN RECEIVED        Diagnosis:  Patient Active Problem List   Diagnosis    Rectal bleeding    Fever    Upper GI bleed    Fibromyalgia    Lupus (systemic lupus erythematosus) (HCC)    Nausea & vomiting    Chest pain    Chronic pancreatitis (HCC)    HTN (hypertension)    Acute pancreatitis    Right-sided chest pain    Super obesity    Absolute anemia    Hypokalemia    Generalized abdominal pain    Pneumonia of both lungs due to infectious organism    Foot ulcer, left ([address]    SLE (systemic lupus erythematosus related syndrome) ([facility])    Hypoxia    Cellulitis    Pancytopenia ([facility])    Pleurisy    Frequent UTI    Gastroesophageal reflux disease without esophagitis    MRSA cellulitis of left foot    Depression    Fatty liver    Fatty liver disease, nonalcoholic    Arthritis    Bilateral low back pain with left-sided sciatica    Morbid obesity with BMI of 50.0-59.9, adult (HCC)    Intractable vomiting with nausea    Class 3 obesity in adult    Hiatal hernia    Poor intravenous access    Post-operative nausea and vomiting    Status post bariatric surgery    Status post laparoscopic sleeve gastrectomy    Abscess    Dysphagia    Pseudoseizure    Chronic pain syndrome    Drug-seeking/Aberrant behavior    Acute conjunctivitis    Chronic osteomyelitis of left foot with draining sinus (HCC)    Receiving intravenous antibiotic treatment as outpatient    History of bacteremia    Abdominal pain, right upper quadrant    Sepsis due to Pseudomonas species ([address]    Bacteremia due to Pseudomonas    Catheter-related bloodstream

## 2020-07-06 ENCOUNTER — ANESTHESIA EVENT (OUTPATIENT)
Dept: OPERATING ROOM | Age: 50
DRG: 982 | End: 2020-07-06
Payer: COMMERCIAL

## 2020-07-06 ENCOUNTER — APPOINTMENT (OUTPATIENT)
Dept: GENERAL RADIOLOGY | Age: 50
DRG: 982 | End: 2020-07-06
Payer: COMMERCIAL

## 2020-07-06 VITALS — DIASTOLIC BLOOD PRESSURE: 62 MMHG | SYSTOLIC BLOOD PRESSURE: 103 MMHG | OXYGEN SATURATION: 100 %

## 2020-07-06 PROCEDURE — C1788 PORT, INDWELLING, IMP: HCPCS | Performed by: SURGERY

## 2020-07-06 PROCEDURE — 74240 X-RAY XM UPR GI TRC 1CNTRST: CPT

## 2020-07-06 PROCEDURE — 83690 ASSAY OF LIPASE: CPT

## 2020-07-06 PROCEDURE — 6360000002 HC RX W HCPCS: Performed by: SURGERY

## 2020-07-06 PROCEDURE — 2709999900 HC NON-CHARGEABLE SUPPLY: Performed by: SURGERY

## 2020-07-06 PROCEDURE — 2500000003 HC RX 250 WO HCPCS: Performed by: SURGERY

## 2020-07-06 PROCEDURE — C9113 INJ PANTOPRAZOLE SODIUM, VIA: HCPCS | Performed by: SURGERY

## 2020-07-06 PROCEDURE — 3700000000 HC ANESTHESIA ATTENDED CARE: Performed by: SURGERY

## 2020-07-06 PROCEDURE — 2580000003 HC RX 258: Performed by: SURGERY

## 2020-07-06 PROCEDURE — 6370000000 HC RX 637 (ALT 250 FOR IP): Performed by: SURGERY

## 2020-07-06 PROCEDURE — 82150 ASSAY OF AMYLASE: CPT

## 2020-07-06 PROCEDURE — 2580000003 HC RX 258: Performed by: GENERAL PRACTICE

## 2020-07-06 PROCEDURE — 2580000003 HC RX 258: Performed by: ANESTHESIOLOGY

## 2020-07-06 PROCEDURE — 99232 SBSQ HOSP IP/OBS MODERATE 35: CPT | Performed by: SURGERY

## 2020-07-06 PROCEDURE — 6360000002 HC RX W HCPCS: Performed by: GENERAL PRACTICE

## 2020-07-06 PROCEDURE — 1200000000 HC SEMI PRIVATE

## 2020-07-06 PROCEDURE — 0JH60WZ INSERTION OF TOTALLY IMPLANTABLE VASCULAR ACCESS DEVICE INTO CHEST SUBCUTANEOUS TISSUE AND FASCIA, OPEN APPROACH: ICD-10-PCS | Performed by: SURGERY

## 2020-07-06 PROCEDURE — 3600000003 HC SURGERY LEVEL 3 BASE: Performed by: SURGERY

## 2020-07-06 PROCEDURE — 3700000001 HC ADD 15 MINUTES (ANESTHESIA): Performed by: SURGERY

## 2020-07-06 PROCEDURE — 2720000010 HC SURG SUPPLY STERILE: Performed by: SURGERY

## 2020-07-06 PROCEDURE — 3600000013 HC SURGERY LEVEL 3 ADDTL 15MIN: Performed by: SURGERY

## 2020-07-06 PROCEDURE — 02HV33Z INSERTION OF INFUSION DEVICE INTO SUPERIOR VENA CAVA, PERCUTANEOUS APPROACH: ICD-10-PCS | Performed by: SURGERY

## 2020-07-06 PROCEDURE — 76000 FLUOROSCOPY <1 HR PHYS/QHP: CPT

## 2020-07-06 PROCEDURE — C1894 INTRO/SHEATH, NON-LASER: HCPCS | Performed by: SURGERY

## 2020-07-06 PROCEDURE — C9113 INJ PANTOPRAZOLE SODIUM, VIA: HCPCS | Performed by: GENERAL PRACTICE

## 2020-07-06 PROCEDURE — 80053 COMPREHEN METABOLIC PANEL: CPT

## 2020-07-06 PROCEDURE — 6360000002 HC RX W HCPCS

## 2020-07-06 DEVICE — PORT INFUS L55CM 0.016ML 0.4ML CATH OD2.2MM ID1.4MM INTRO: Type: IMPLANTABLE DEVICE | Site: CHEST | Status: FUNCTIONAL

## 2020-07-06 RX ORDER — FENTANYL CITRATE 50 UG/ML
INJECTION, SOLUTION INTRAMUSCULAR; INTRAVENOUS PRN
Status: DISCONTINUED | OUTPATIENT
Start: 2020-07-06 | End: 2020-07-06 | Stop reason: SDUPTHER

## 2020-07-06 RX ORDER — OXYCODONE HYDROCHLORIDE AND ACETAMINOPHEN 5; 325 MG/1; MG/1
2 TABLET ORAL EVERY 4 HOURS PRN
Status: DISCONTINUED | OUTPATIENT
Start: 2020-07-06 | End: 2020-07-07 | Stop reason: HOSPADM

## 2020-07-06 RX ORDER — HEPARIN SODIUM (PORCINE) LOCK FLUSH IV SOLN 100 UNIT/ML 100 UNIT/ML
SOLUTION INTRAVENOUS
Status: COMPLETED | OUTPATIENT
Start: 2020-07-06 | End: 2020-07-06

## 2020-07-06 RX ORDER — SODIUM CHLORIDE, SODIUM LACTATE, POTASSIUM CHLORIDE, CALCIUM CHLORIDE 600; 310; 30; 20 MG/100ML; MG/100ML; MG/100ML; MG/100ML
INJECTION, SOLUTION INTRAVENOUS ONCE
Status: COMPLETED | OUTPATIENT
Start: 2020-07-06 | End: 2020-07-06

## 2020-07-06 RX ORDER — SODIUM CHLORIDE, SODIUM LACTATE, POTASSIUM CHLORIDE, CALCIUM CHLORIDE 600; 310; 30; 20 MG/100ML; MG/100ML; MG/100ML; MG/100ML
INJECTION, SOLUTION INTRAVENOUS ONCE
Status: DISCONTINUED | OUTPATIENT
Start: 2020-07-06 | End: 2020-07-06 | Stop reason: HOSPADM

## 2020-07-06 RX ORDER — DEXAMETHASONE SODIUM PHOSPHATE 4 MG/ML
INJECTION, SOLUTION INTRA-ARTICULAR; INTRALESIONAL; INTRAMUSCULAR; INTRAVENOUS; SOFT TISSUE PRN
Status: DISCONTINUED | OUTPATIENT
Start: 2020-07-06 | End: 2020-07-06 | Stop reason: SDUPTHER

## 2020-07-06 RX ORDER — PROPOFOL 10 MG/ML
INJECTION, EMULSION INTRAVENOUS PRN
Status: DISCONTINUED | OUTPATIENT
Start: 2020-07-06 | End: 2020-07-06 | Stop reason: SDUPTHER

## 2020-07-06 RX ADMIN — DEXAMETHASONE SODIUM PHOSPHATE 4 MG: 4 INJECTION, SOLUTION INTRAMUSCULAR; INTRAVENOUS at 15:47

## 2020-07-06 RX ADMIN — TIZANIDINE 4 MG: 4 TABLET ORAL at 20:16

## 2020-07-06 RX ADMIN — PROPOFOL 30 MG: 10 INJECTION, EMULSION INTRAVENOUS at 15:06

## 2020-07-06 RX ADMIN — ONDANSETRON HYDROCHLORIDE 4 MG: 2 SOLUTION INTRAMUSCULAR; INTRAVENOUS at 18:31

## 2020-07-06 RX ADMIN — OXYCODONE HYDROCHLORIDE AND ACETAMINOPHEN 2 TABLET: 5; 325 TABLET ORAL at 16:46

## 2020-07-06 RX ADMIN — PROPOFOL 40 MG: 10 INJECTION, EMULSION INTRAVENOUS at 14:57

## 2020-07-06 RX ADMIN — Medication 0.5 MG: at 04:43

## 2020-07-06 RX ADMIN — PROPOFOL 30 MG: 10 INJECTION, EMULSION INTRAVENOUS at 15:02

## 2020-07-06 RX ADMIN — PROPOFOL 30 MG: 10 INJECTION, EMULSION INTRAVENOUS at 15:04

## 2020-07-06 RX ADMIN — PROPOFOL 50 MG: 10 INJECTION, EMULSION INTRAVENOUS at 15:20

## 2020-07-06 RX ADMIN — OXYCODONE HYDROCHLORIDE AND ACETAMINOPHEN 2 TABLET: 5; 325 TABLET ORAL at 21:19

## 2020-07-06 RX ADMIN — PROPOFOL 50 MG: 10 INJECTION, EMULSION INTRAVENOUS at 15:25

## 2020-07-06 RX ADMIN — GABAPENTIN 800 MG: 400 CAPSULE ORAL at 16:45

## 2020-07-06 RX ADMIN — FENTANYL CITRATE 50 MCG: 50 INJECTION INTRAMUSCULAR; INTRAVENOUS at 14:48

## 2020-07-06 RX ADMIN — PROMETHAZINE HYDROCHLORIDE 12.5 MG: 25 INJECTION INTRAMUSCULAR; INTRAVENOUS at 19:03

## 2020-07-06 RX ADMIN — PAROXETINE 50 MG: 10 TABLET, FILM COATED ORAL at 20:16

## 2020-07-06 RX ADMIN — LORAZEPAM 1 MG: 1 TABLET ORAL at 21:19

## 2020-07-06 RX ADMIN — SODIUM CHLORIDE, PRESERVATIVE FREE 10 ML: 5 INJECTION INTRAVENOUS at 20:17

## 2020-07-06 RX ADMIN — PROPOFOL 30 MG: 10 INJECTION, EMULSION INTRAVENOUS at 15:08

## 2020-07-06 RX ADMIN — DICYCLOMINE HYDROCHLORIDE 10 MG: 10 CAPSULE ORAL at 18:31

## 2020-07-06 RX ADMIN — SODIUM CHLORIDE, POTASSIUM CHLORIDE, SODIUM LACTATE AND CALCIUM CHLORIDE: 600; 310; 30; 20 INJECTION, SOLUTION INTRAVENOUS at 12:30

## 2020-07-06 RX ADMIN — FENTANYL CITRATE 25 MCG: 50 INJECTION INTRAMUSCULAR; INTRAVENOUS at 15:04

## 2020-07-06 RX ADMIN — SODIUM CHLORIDE, PRESERVATIVE FREE 10 ML: 5 INJECTION INTRAVENOUS at 08:44

## 2020-07-06 RX ADMIN — PROPOFOL 50 MG: 10 INJECTION, EMULSION INTRAVENOUS at 15:19

## 2020-07-06 RX ADMIN — CEFAZOLIN SODIUM 3 G: 1 INJECTION, POWDER, FOR SOLUTION INTRAMUSCULAR; INTRAVENOUS at 14:58

## 2020-07-06 RX ADMIN — PROPOFOL 30 MG: 10 INJECTION, EMULSION INTRAVENOUS at 15:17

## 2020-07-06 RX ADMIN — ONDANSETRON HYDROCHLORIDE 4 MG: 2 SOLUTION INTRAMUSCULAR; INTRAVENOUS at 04:43

## 2020-07-06 RX ADMIN — PROPOFOL 30 MG: 10 INJECTION, EMULSION INTRAVENOUS at 15:00

## 2020-07-06 RX ADMIN — SODIUM CHLORIDE: 9 INJECTION, SOLUTION INTRAVENOUS at 04:46

## 2020-07-06 RX ADMIN — Medication 0.5 MG: at 01:40

## 2020-07-06 RX ADMIN — SODIUM CHLORIDE: 9 INJECTION, SOLUTION INTRAVENOUS at 16:20

## 2020-07-06 RX ADMIN — SODIUM CHLORIDE, POTASSIUM CHLORIDE, SODIUM LACTATE AND CALCIUM CHLORIDE: 600; 310; 30; 20 INJECTION, SOLUTION INTRAVENOUS at 14:43

## 2020-07-06 RX ADMIN — PROPOFOL 50 MG: 10 INJECTION, EMULSION INTRAVENOUS at 15:22

## 2020-07-06 RX ADMIN — PROPOFOL 30 MG: 10 INJECTION, EMULSION INTRAVENOUS at 15:11

## 2020-07-06 RX ADMIN — GABAPENTIN 800 MG: 400 CAPSULE ORAL at 20:16

## 2020-07-06 RX ADMIN — ONDANSETRON HYDROCHLORIDE 4 MG: 2 SOLUTION INTRAMUSCULAR; INTRAVENOUS at 08:43

## 2020-07-06 RX ADMIN — Medication 0.5 MG: at 08:43

## 2020-07-06 RX ADMIN — PANTOPRAZOLE SODIUM 40 MG: 40 INJECTION, POWDER, FOR SOLUTION INTRAVENOUS at 20:16

## 2020-07-06 RX ADMIN — FENTANYL CITRATE 25 MCG: 50 INJECTION INTRAMUSCULAR; INTRAVENOUS at 14:57

## 2020-07-06 RX ADMIN — PROPOFOL 30 MG: 10 INJECTION, EMULSION INTRAVENOUS at 15:13

## 2020-07-06 RX ADMIN — PROPOFOL 30 MG: 10 INJECTION, EMULSION INTRAVENOUS at 15:15

## 2020-07-06 RX ADMIN — PANTOPRAZOLE SODIUM 40 MG: 40 INJECTION, POWDER, FOR SOLUTION INTRAVENOUS at 08:43

## 2020-07-06 RX ADMIN — PROPOFOL 50 MG: 10 INJECTION, EMULSION INTRAVENOUS at 14:48

## 2020-07-06 RX ADMIN — PROPOFOL 30 MG: 10 INJECTION, EMULSION INTRAVENOUS at 15:10

## 2020-07-06 RX ADMIN — PROPOFOL 30 MG: 10 INJECTION, EMULSION INTRAVENOUS at 14:51

## 2020-07-06 RX ADMIN — PROPOFOL 30 MG: 10 INJECTION, EMULSION INTRAVENOUS at 14:54

## 2020-07-06 ASSESSMENT — PAIN SCALES - GENERAL
PAINLEVEL_OUTOF10: 9
PAINLEVEL_OUTOF10: 7
PAINLEVEL_OUTOF10: 0
PAINLEVEL_OUTOF10: 7
PAINLEVEL_OUTOF10: 7
PAINLEVEL_OUTOF10: 9
PAINLEVEL_OUTOF10: 0

## 2020-07-06 ASSESSMENT — PULMONARY FUNCTION TESTS
PIF_VALUE: 1
PIF_VALUE: 0
PIF_VALUE: 1
PIF_VALUE: 1
PIF_VALUE: 0
PIF_VALUE: 1
PIF_VALUE: 0
PIF_VALUE: 1
PIF_VALUE: 0
PIF_VALUE: 0
PIF_VALUE: 1
PIF_VALUE: 0
PIF_VALUE: 1
PIF_VALUE: 0
PIF_VALUE: 0
PIF_VALUE: 1
PIF_VALUE: 0
PIF_VALUE: 1
PIF_VALUE: 0
PIF_VALUE: 1
PIF_VALUE: 1
PIF_VALUE: 0
PIF_VALUE: 0
PIF_VALUE: 1
PIF_VALUE: 0
PIF_VALUE: 1

## 2020-07-06 NOTE — PROCEDURES
Patient came to radiology for an UGI with Small bowel follow through. Attempted to drink gastrograffin patient began to vomit. Was able to get some down to get the UGI part of the study. Nurse was notified.

## 2020-07-06 NOTE — PROGRESS NOTES
INTERNAL MEDICINE PROGRESS NOTE        Wei Linares   1970   Primary Care Physician:  Mars Magallon MD  Admit Date: 7/3/2020     Subjective:         Objective:   /75   Pulse 79   Temp 98 °F (36.7 °C) (Oral)   Resp 16   Ht 5' 4\" (1.626 m)   Wt 274 lb 11.1 oz (124.6 kg)   SpO2 94%   BMI 47.15 kg/m²    General appearance: alert, appears older than stated age and cooperative  Head: Normocephalic, without obvious abnormality, atraumatic  Neck: no adenopathy and supple, symmetrical, trachea midline  Lungs: clear to auscultation bilaterally  Heart: regular rate and rhythm and S1, S2 normal  Abdomen: non specific abdominal tenderness.    Extremities: no clubbing, cyanosis or edema  Neurologic: Grossly normal    Data Review  Lab Results   Component Value Date     07/03/2020    K 4.1 07/03/2020     07/03/2020    CO2 24 07/03/2020    CREATININE 0.8 07/03/2020    BUN 16 07/03/2020    CALCIUM 10.6 07/03/2020     Lab Results   Component Value Date    WBC 5.8 07/03/2020    HGB 12.8 07/03/2020    HCT 40.2 07/03/2020    MCV 84.1 07/03/2020     07/03/2020     INR/Prothrombin Time      Meds:    pantoprazole  40 mg Intravenous BID    atenolol  25 mg Oral Daily    gabapentin  800 mg Oral TID    levothyroxine  125 mcg Oral Daily    CENTRUM/CERTA-LILLY with minerals oral  15 mL Oral Daily    PARoxetine  50 mg Oral Nightly    tiZANidine  4 mg Oral BID    sodium chloride flush  10 mL Intravenous BID    enoxaparin  40 mg Subcutaneous Daily     PRN Meds: HYDROmorphone, dicyclomine, LORazepam, oxyCODONE-acetaminophen, sodium chloride flush, acetaminophen **OR** acetaminophen, polyethylene glycol, ondansetron, promethazine    Assessment/Plan:   Patient Active Hospital Problem List:  Patient Active Problem List   Diagnosis    Rectal bleeding    Fibromyalgia    Lupus (systemic lupus erythematosus) (HCC)    Chronic pancreatitis (HCC)    HTN (hypertension)    Generalized abdominal pain    Hypoxia    Frequent UTI    Gastroesophageal reflux disease without esophagitis    Depression    Fatty liver disease, nonalcoholic    Arthritis    Bilateral low back pain with left-sided sciatica    Intractable vomiting with nausea    Hiatal hernia    Status post laparoscopic sleeve gastrectomy    Pseudoseizure    Chronic pain syndrome    Drug-seeking/Aberrant behavior    Lymph node disorder    Morbid obesity with BMI of 40.0-44.9, adult (HCC)    Iron deficiency anemia secondary to blood loss (chronic)    Encounter for weight management    Intractable nausea and vomiting   intractable nausea, vomiting: little better  Chronic pancreatitis: on pain medications  Anemia: stable  Hypertension: stable  Dehydration: continue iv fluid  Need for port placement: Dr. Arcelia Yadav consulted  Hiatal hernia and reflux disease: continue present management   Chronic pain syndrome: monitor for now. Plan:  -- continue present management at this time. -- iv fluid once iv line is in.  -- try to draw blood again.  ( could not do it this am)  -- surgery consult obtained for port placement  -- monitor nausea,vomiting

## 2020-07-06 NOTE — PLAN OF CARE
Problem: Pain:  Goal: Pain level will decrease  Description: Pain level will decrease  7/6/2020 0051 by Jorge Spicer RN  Outcome: Ongoing  7/5/2020 1052 by Iker Ramirez RN  Outcome: Ongoing  Goal: Control of acute pain  Description: Control of acute pain  7/6/2020 0051 by Jorge Spicer RN  Outcome: Ongoing  7/5/2020 1052 by Iker Ramirez RN  Outcome: Ongoing  Goal: Control of chronic pain  Description: Control of chronic pain  7/6/2020 0051 by Jorge Spicer RN  Outcome: Ongoing  7/5/2020 1052 by Iker Ramirez RN  Outcome: Ongoing     Problem: Nausea/Vomiting:  Goal: Absence of nausea/vomiting  Description: Absence of nausea/vomiting  7/6/2020 0051 by Jorge Spicer RN  Outcome: Ongoing  7/5/2020 1052 by Iker Ramirez RN  Outcome: Ongoing  Goal: Able to drink  Description: Able to drink  7/6/2020 0051 by Jorge Spicer RN  Outcome: Ongoing  7/5/2020 1052 by Iker Ramirez RN  Outcome: Ongoing  Goal: Able to eat  Description: Able to eat  7/6/2020 0051 by Jorge Spicer RN  Outcome: Ongoing  7/5/2020 1052 by Iker Ramirez RN  Outcome: Ongoing  Goal: Ability to achieve adequate nutritional intake will improve  Description: Ability to achieve adequate nutritional intake will improve  7/6/2020 0051 by Jorge Spicer RN  Outcome: Ongoing  7/5/2020 1052 by Iker Ramirez RN  Outcome: Ongoing     Problem: Falls - Risk of:  Goal: Will remain free from falls  Description: Will remain free from falls  Outcome: Ongoing  Goal: Absence of physical injury  Description: Absence of physical injury  Outcome: Ongoing

## 2020-07-06 NOTE — PROGRESS NOTES
GENERAL SURGERY PROGRESS NOTE    CC/HPI:           Patient feels a little better from IVF hydration. . but still nauseated and vomits. Vitals:    07/05/20 2049 07/06/20 0503 07/06/20 0830 07/06/20 1000   BP: 126/75 126/62 138/66 122/64   Pulse: 79 89 83 67   Resp: 16 16 16 16   Temp: 98 °F (36.7 °C) 97.9 °F (36.6 °C) 98 °F (36.7 °C) 98 °F (36.7 °C)   TempSrc: Oral Oral  Oral   SpO2: 94% 95% 94% 95%   Weight:       Height:         No intake/output data recorded. No intake/output data recorded. Diet NPO, After Midnight    Recent Results (from the past 48 hour(s))   POCT Glucose    Collection Time: 07/04/20 12:44 PM   Result Value Ref Range    POC Glucose 95 70 - 99 MG/DL   SPECIMEN REJECTION    Collection Time: 07/05/20  9:26 AM   Result Value Ref Range    Rejected Test CMPR, CD, LIPA, THEO     Reason for Rejection NO SPECIMEN RECEIVED        Scheduled Meds:   pantoprazole  40 mg Intravenous BID    atenolol  25 mg Oral Daily    gabapentin  800 mg Oral TID    levothyroxine  125 mcg Oral Daily    CENTRUM/CERTA-LILLY with minerals oral  15 mL Oral Daily    PARoxetine  50 mg Oral Nightly    tiZANidine  4 mg Oral BID    sodium chloride flush  10 mL Intravenous BID    enoxaparin  40 mg Subcutaneous Daily       Continuous Infusions:   sodium chloride 100 mL/hr at 07/06/20 0446       Physical Exam:  HEENT: Anicteric sclerae, Oropharyngeal mucosae moist, pink and intact. Heart:  Normal S1 and S2, RRR  Lungs: Clear to auscultation bilaterally, No audible Wheezes or Rales. Extremities: No edema. Neuro: Alert and Oriented x 3, Non focal.  Abdomen: Soft, Benign, Non tender, Non distended, Positive bowel sounds. Active Problems:    Intractable nausea and vomiting  Resolved Problems:    * No resolved hospital problems.  *        Assessment and Plan:  Alejandro Montano is a 44 y.o. female with dehydration, and N/V, her upper GI reviewed, and NO surgical abnormality, will place a Mediport today, and will follow.     ___________________________________________    Jaspal Daugherty MD, FACS, FICS  7/6/2020  10:45 AM

## 2020-07-06 NOTE — ANESTHESIA PRE PROCEDURE
Department of Anesthesiology  Preprocedure Note       Name:  Yuliet Alston   Age:  44 y.o.  :  1970                                          MRN:  [id number]         Date:  2020      Surgeon: Olesya Garcia):  Damián Palacio MD    Procedure: Procedure(s):  PORT INSERTION    Medications prior to admission:   Prior to Admission medications    Medication Sig Start Date End Date Taking? Authorizing Provider   LORazepam (ATIVAN) 1 MG tablet Take 1 mg by mouth as needed for Anxiety. Yes Historical Provider, MD   promethazine (PHENERGAN) 25 MG tablet Take 1 tablet by mouth every 6 hours as needed for Nausea 20   Damián Palacio MD   pantoprazole (PROTONIX) 40 MG tablet Take 1 tablet by mouth every morning (before breakfast) 20   Damián Palacio MD   dicyclomine (BENTYL) 10 MG capsule Take 1 capsule by mouth 3 times daily as needed (abdominal pain) 20   Aurelia Abrams [place]   oxyCODONE-acetaminophen (PERCOCET) 5-325 MG per tablet Take 1 tablet by mouth.  Every 4-6 hours PRN    Historical Provider, MD   ondansetron (ZOFRAN ODT) 4 MG disintegrating tablet Take 1 tablet by mouth every 8 hours as needed for Nausea or Vomiting 19   Raisa Rao MD   Cholecalciferol (VITAMIN D3) 5000 units TABS Take 1 tablet by mouth daily    Historical Provider, MD   tiZANidine (ZANAFLEX) 4 MG tablet Take 4 mg by mouth 2 times daily    Historical Provider, MD   estradiol (ESTRACE) 0.5 MG tablet Take 0.5 mg by mouth daily    Historical Provider, MD   atenolol (TENORMIN) 25 MG tablet Take 25 mg by mouth daily    Historical Provider, MD   Multiple Vitamin (MVI, BARIATRIC ADVANTAGE MULTI-FORMULA, CHEW TAB) Take 1 tablet by mouth daily 19   Damián Palacio MD   Calcium Citrate-Vitamin D (CALCIUM + VIT D, BARIATRIC ADVANTAGE, CHEWABLE TABLET) Take 1 tablet by mouth 2 times daily 19   Damián Palacio MD   levothyroxine (SYNTHROID) 125 MCG tablet Take 1 tablet by mouth Daily  Patient taking differently: Take 125 mcg by mouth nightly  8/8/18   Kaz Latif MD   gabapentin (NEURONTIN) 800 MG tablet Take 800 mg by mouth 3 times daily    Historical Provider, MD   PARoxetine (PAXIL) 30 MG tablet Take 50 mg by mouth nightly     Historical Provider, MD       Current medications:    No current facility-administered medications for this encounter. No current outpatient medications on file.      Facility-Administered Medications Ordered in Other Encounters   Medication Dose Route Frequency Provider Last Rate Last Dose    HYDROmorphone (DILAUDID) injection 0.5 mg  0.5 mg Intravenous Q3H PRN Reece Dickens MD   0.5 mg at 07/06/20 0443    0.9 % sodium chloride infusion   Intravenous Continuous Reece Dickens  mL/hr at 07/06/20 0446      pantoprazole (PROTONIX) injection 40 mg  40 mg Intravenous BID Reece Dickens MD   40 mg at 07/05/20 2242    atenolol (TENORMIN) tablet 25 mg  25 mg Oral Daily Kaleigh Coleman MD   Stopped at 07/04/20 0900    dicyclomine (BENTYL) capsule 10 mg  10 mg Oral TID PRN Reece Dickens MD        gabapentin (NEURONTIN) capsule 800 mg  800 mg Oral TID Reece Dickens MD   800 mg at 07/05/20 2243    levothyroxine (SYNTHROID) tablet 125 mcg  125 mcg Oral Daily Kaleigh Coleman MD   Stopped at 07/06/20 0700    LORazepam (ATIVAN) tablet 1 mg  1 mg Oral BID PRN Kaleigh Coleman MD        CENTRUM/CERTA-LILLY with minerals oral solution 15 mL  15 mL Oral Daily Kaleigh Coleman MD   Stopped at 07/04/20 0900    oxyCODONE-acetaminophen (PERCOCET) 5-325 MG per tablet 1 tablet  1 tablet Oral Q4H PRN Reece Dickens MD        PARoxetine (PAXIL) tablet 50 mg  50 mg Oral Nightly Reece Dickens MD   50 mg at 07/05/20 2242    tiZANidine (ZANAFLEX) tablet 4 mg  4 mg Oral BID Reece Dickens MD   4 mg at 07/05/20 2243    sodium chloride flush 0.9 % injection 10 mL  10 mL Intravenous BID Sarina Chu MD   10 mL at 07/05/20 2242    sodium chloride flush 0.9 % injection 10 mL  10 mL Intravenous PRN Reece Pérez MD   10 mL at 07/04/20 1248    acetaminophen (TYLENOL) tablet 650 mg  650 mg Oral Q6H PRN Reece Pérez MD        Or   [facility] acetaminophen (TYLENOL) suppository 650 mg  650 mg Rectal Q6H PRN Reece Pérez MD        polyethylene glycol (GLYCOLAX) packet 17 g  17 g Oral Daily PRN Reece Pérez MD        enoxaparin (LOVENOX) injection 40 mg  40 mg Subcutaneous Daily Reece Pérez MD   40 mg at 07/05/20 1123    ondansetron (ZOFRAN) injection 4 mg  4 mg Intravenous Q6H PRN Reece Pérez MD   4 mg at 07/06/20 0443    promethazine (PHENERGAN) injection 12.5 mg  12.5 mg Intravenous Q6H PRN Reece Pérez MD   12.5 mg at 07/05/20 1613       Allergies:     Allergies   Allergen Reactions    Aspirin Palpitations     \"My Heart Rate Elevates\"    Shellfish-Derived Products Swelling    Toradol [Ketorolac Tromethamine] Rash    Compazine [Prochlorperazine]     Reglan [Metoclopramide] Itching       Problem List:    Patient Active Problem List   Diagnosis Code    Rectal bleeding K62.5    Fibromyalgia M79.7    Lupus (systemic lupus erythematosus) ([facility]) M32.9    Chronic pancreatitis ([facility]) K86.1    HTN (hypertension) I10    Generalized abdominal pain R10.84    Hypoxia R09.02    Frequent UTI N39.0    Gastroesophageal reflux disease without esophagitis K21.9    Depression F32.9    Fatty liver disease, nonalcoholic L43.9    Arthritis M19.90    Bilateral low back pain with left-sided sciatica M54.42    Intractable vomiting with nausea R11.2    Hiatal hernia K44.9    Status post laparoscopic sleeve gastrectomy Z98.84    Pseudoseizure F44.5    Chronic pain syndrome G89.4    Drug-seeking/Aberrant behavior Z76.5    Lymph node disorder I89.9    Morbid obesity with BMI of 40.0-44.9, adult (HCC) E66.01, Z68.41    Iron at [address] N/A 2/12/2019    GASTRECTOMY SLEEVE LAPAROSCOPIC ROBOTIC [facility]  08/27/[facility]    UPPER GASTROINTESTINAL ENDOSCOPY N/A 4/2/2019    EGD CONTROL HEMORRHAGE with epi injection at bleeding site performed by Esperanza Delgado MD at [address] 6/19/2019    EGD DIAGNOSTIC ONLY performed by Esperanza Delgado MD at [place] [address] N/A 7/22/2019    EGD DIAGNOSTIC ONLY performed by Ashu Cedillo MD at [place] [address] N/A 10/14/2019    EGD DIAGNOSTIC ONLY performed by Esperanza Delgado MD at [address] [place] [address] History:    Social History     Tobacco Use    Smoking status: Never Smoker    Smokeless tobacco: Never Used   Substance Use Topics    Alcohol use: No     Alcohol/week: 0.0 standard drinks                                Counseling given: Not Answered      Vital Signs (Current):   Vitals:    07/02/20 1304   Weight: 255 lb (115.7 kg)   Height: 5' 4\" (1.626 m)                                              BP Readings from Last 3 Encounters:   07/06/20 126/62   04/13/20 116/86   03/06/20 (!) 145/82       NPO Status:                                                                                 BMI:   Wt Readings from Last 3 Encounters:   07/03/20 274 lb 11.1 oz (124.6 kg)   04/13/20 260 lb (117.9 kg)   03/06/20 261 lb (118.4 kg)     Body mass index is 43.77 kg/m².     CBC:   Lab Results   Component Value Date    WBC 5.8 07/03/2020    RBC 4.78 07/03/2020    HGB 12.8 07/03/2020    HCT 40.2 07/03/2020    MCV 84.1 07/03/2020    RDW 14.2 07/03/2020     07/03/2020       CMP:   Lab Results   Component Value Date     07/03/2020    K 4.1 07/03/2020     07/03/2020    CO2 24 07/03/2020    BUN 16 07/03/2020    CREATININE 0.8 07/03/2020    GFRAA >60 07/03/2020    LABGLOM >60 07/03/2020    GLUCOSE 89 07/03/2020    PROT 8.2 07/03/2020    PROT 6.5 11/18/2011    CALCIUM 10.6 07/03/2020    BILITOT 0.3 07/03/2020    ALKPHOS 120 07/03/2020    AST 29 07/03/2020    ALT 23 07/03/2020       POC Tests:   Recent Labs     07/04/20  1244   POCGLU 95       Coags:   Lab Results   Component Value Date    PROTIME 11.2 01/10/2020    PROTIME 11.4 12/01/2010    INR 0.93 01/10/2020    APTT 32.5 01/10/2020       HCG (If Applicable):   Lab Results   Component Value Date    PREGTESTUR NEGATIVE 07/10/2017        ABGs: No results found for: PHART, PO2ART, EMO5VFX, CQN3PEH, BEART, H2FCGYJW     Type & Screen (If Applicable):  No results found for: LABABO, LABRH    Drug/Infectious Status (If Applicable):  Lab Results   Component Value Date    HEPCAB 0.18 11/18/2015       COVID-19 Screening (If Applicable):   Lab Results   Component Value Date    COVID19 NOT DETECTED 07/03/2020         Anesthesia Evaluation  Patient summary reviewed  Airway: Mallampati: II  TM distance: >3 FB   Neck ROM: full  Mouth opening: > = 3 FB Dental:          Pulmonary: breath sounds clear to auscultation  (+) COPD:  sleep apnea:                             Cardiovascular:    (+) hypertension:,         Rhythm: regular  Rate: normal                 ROS comment: Echo   Summary   Left ventricular systolic function is normal.   Ejection fraction is visually estimated at 55%. Aneurysmal interatrial septum with positive bubble study. No evidence of thrombus within the left atrial appendage. Moderate to severe tricuspid regurgitation is present. No evidence of any pericardial effusion. No evidence of endocarditis. Neuro/Psych:   (+) neuromuscular disease:, psychiatric history:            GI/Hepatic/Renal:   (+) hiatal hernia, GERD:, PUD,           Endo/Other:                     Abdominal:   (+) obese,     Abdomen: soft.     Vascular:                                      Anesthesia Plan      MAC     ASA 3 ( Pre Anesthesia Assessment complete. Chart reviewed on 7/6/2020)  Induction: intravenous. Anesthetic plan and risks discussed with patient. Use of blood products discussed with patient whom consented to blood products. Plan discussed with CRNA.                 MICHAEL Kilgore - CRNA   7/6/2020

## 2020-07-06 NOTE — PLAN OF CARE
Problem: Pain:  Goal: Pain level will decrease  Description: Pain level will decrease  7/6/2020 1044 by Fly Bourgeois RN  Outcome: Ongoing  7/6/2020 0051 by Yecenia Lynch RN  Outcome: Ongoing  Goal: Control of acute pain  Description: Control of acute pain  7/6/2020 1044 by Fly Bourgeois RN  Outcome: Ongoing  7/6/2020 0051 by Yecenia Lynch RN  Outcome: Ongoing  Goal: Control of chronic pain  Description: Control of chronic pain  7/6/2020 1044 by Fly Bourgeois RN  Outcome: Ongoing  7/6/2020 0051 by Yecenia Lynch RN  Outcome: Ongoing     Problem: Nausea/Vomiting:  Goal: Absence of nausea/vomiting  Description: Absence of nausea/vomiting  7/6/2020 1044 by Fly Bourgeois RN  Outcome: Ongoing  7/6/2020 0051 by Yecenia Lynch RN  Outcome: Ongoing  Goal: Able to drink  Description: Able to drink  7/6/2020 1044 by Fly Bourgeois RN  Outcome: Ongoing  7/6/2020 0051 by Yecenia Lynch RN  Outcome: Ongoing  Goal: Able to eat  Description: Able to eat  7/6/2020 1044 by Fly Bourgeois RN  Outcome: Ongoing  7/6/2020 0051 by Yecenia Lynch RN  Outcome: Ongoing  Goal: Ability to achieve adequate nutritional intake will improve  Description: Ability to achieve adequate nutritional intake will improve  7/6/2020 1044 by Fly Bourgeois RN  Outcome: Ongoing  7/6/2020 0051 by Yecenia Lynch RN  Outcome: Ongoing     Problem: Falls - Risk of:  Goal: Will remain free from falls  Description: Will remain free from falls  7/6/2020 1044 by Fly Bourgeois RN  Outcome: Ongoing  7/6/2020 0051 by Yecenia Lynch RN  Outcome: Ongoing  Goal: Absence of physical injury  Description: Absence of physical injury  7/6/2020 1044 by Fly Bourgeois RN  Outcome: Ongoing  7/6/2020 0051 by Yecenia Lynch RN  Outcome: Ongoing

## 2020-07-06 NOTE — PROGRESS NOTES
Attending Progress Note      PCP: Raisa Rao MD    Patient: Yuliet Alston   Gender: female  : 1970   Age: 44 y.o. MRN: [id number]      Date of Admission: 7/3/2020    Chief Complaint:   Chief Complaint   Patient presents with    Abdominal Pain    Emesis           Subjective: no active complain        Medications:  Reviewed  Infusion Medications    sodium chloride 100 mL/hr at 20 0446     Scheduled Medications    pantoprazole  40 mg Intravenous BID    atenolol  25 mg Oral Daily    gabapentin  800 mg Oral TID    levothyroxine  125 mcg Oral Daily    CENTRUM/CERTA-LILLY with minerals oral  15 mL Oral Daily    PARoxetine  50 mg Oral Nightly    tiZANidine  4 mg Oral BID    sodium chloride flush  10 mL Intravenous BID    enoxaparin  40 mg Subcutaneous Daily     PRN Meds: diatrizoate meglumine-sodium, HYDROmorphone, dicyclomine, LORazepam, oxyCODONE-acetaminophen, sodium chloride flush, acetaminophen **OR** acetaminophen, polyethylene glycol, ondansetron, promethazine    No intake or output data in the 24 hours ending 20 1121    Exam:  /64   Pulse 67   Temp 98 °F (36.7 °C) (Oral)   Resp 16   Ht 5' 4\" (1.626 m)   Wt 274 lb 11.1 oz (124.6 kg)   SpO2 95%   BMI 47.15 kg/m²   General appearance: No distress,   Respiratory:  good air entry , no Rales , No wheezing, or rhonchi,  Cardiovascular: RRR, with normal S1/S2 . Abdomen : Soft, non-tender, non-distended  , normal bowel sounds. Legs : No edema bilaterally. No DVT signs ,    Neurologic:  Alert and oriented ,        Labs:   Recent Labs     20  1505   WBC 5.8   HGB 12.8   HCT 40.2        Recent Labs     20  1505      K 4.1      CO2 24   BUN 16   CREATININE 0.8   CALCIUM 10.6     Recent Labs     20  1505   AST 29   ALT 23   BILITOT 0.3   ALKPHOS 120     No results for input(s): INR in the last 72 hours.   No results for input(s): Tiney Abdalla in the last 72

## 2020-07-06 NOTE — OP NOTE
MEDIPORT OPERATIVE PROCEDURE NOTE    Andre Pope, 1970, 44 y.o.,  female, CSN: [id number]  July 6, 2020    PRE-OP DIAGNOSIS: Extremely poor intravenous access       POST-OP DIAGNOSIS: Same    PROCEDURE: Placement of an 6.6-Hebrew, low profile Mediport via left  subclavian vein approach, using the micropuncture kit first, Under Direct Fluoroscopy. SURGEON(S):   Dominic Mauricio M.D., F.A.C.S., F.I.C.S. ASSISTANT(S):  NONE    ANESTHESIA: MAC + Local 1% Xylocaine with Epinephrine, 0.5% Marcaine, mixed, 50% : 50%    SPECIMENS: NONE.    ESTIMATED BLOOD LOSS:  Less then 5 ml           DRAIN: NONE    COMPLICATIONS: NONE           CONDITION / DISPOSITION:  Stable, to PACU     OPERATIVE DESCRIPTION:      After proper informed consent was signed by the  patient with all the risks, benefits, potential complications, and possible  alternatives of the procedure, including but not limited to bleeding,  infection, pneumothorax requiring chest tube placement, port  clotting/clogging requiring port revision with new port placement, amongst  others, the patient was properly identified. In the holding area, she received  2 grams of Ancef IV. TEDs and SCDs were placed on her legs and activated  bilaterally. she voided her urinary bladder prior to OR, and Hibiclens skin  prep was performed of the upper chest and lower neck bilaterally. she was  taken to the operating room and was placed supine on the operating room  table, and after institution of general IV sedation, a shoulder bump was  used. A folded sheet was placed under her scapulae bilaterally. We exposed  the clavicle as anterior as possible. The patient was placed in  Trendelenburg position. The bilateral upper chest and neck all the way to  the chin were prepped and draped in the usual sterile fashion also using  Ioban drape to prevent any contact of the port with bacterial skin jimmy.  A  left SCV approach was started and first path with the micro puncture kit, the left subclavian vein was cannulated without any difficulty. Dark venous blood was aspirated  freely without any difficulty. The guidewire was then passed through the  trocar all the way until it reached the heart. The trocar was then removed  and using the Seldinger technique the guidewire was upgraded to the 0.035 of the Mediport kit; then furthermore local anesthesia was injected for the subcutaneous tunnel  and port site implantation site all the way to the pectoral fascia. A transverse  incision 3 fingerbreadths below the midclavicular entry point of the  guidewire was performed. Dissection was carried down all the way to the  right anterior pectoral fascia. The transverse incision was about 4 cm. Dissection was  carried down to create the pocket and then the port was secured to the  anterior pectoral fascia x3 using 2-0 Prolene. The dilator was then passed  over the guidewire after an 11 blade skin nick incision was performed at the  guidewire entry site at the inferior and midclavicular point. Over the  guidewire, the dilator was advanced without any difficulty, and then the  dilator and sheath were passed over the guidewire, using the Seldinger technique. The guidewire and dilator  were then removed, and the catheter was fed through the sheath all the way until  it reached the right side of the heart. Under direct fluoroscopic visualization, the tip of  the catheter was pulled back gradually until it reached the junction between  the superior vena cava and the right atrium. The tunneler was used to pass  the catheter subcutaneously from the inferior midclavicular entry site to the  port pocket site. It was divided and connected with the port. The securer was pushed  to cover the line and the port to the hub and then the port was accessed with  a Bryant needle and dark venous blood was aspirated freely without any  difficulty.  It was then flushed using 30 mL of normal saline followed by 3  mL of heparinized saline solution 100 units per mL. The deep  dermis/subcutaneous fat was reapproximated using 3-0 Vicryl and the skin of  this incision as well as of the skin nick incision at the mid inferior  clavicular point / access site were reapproximated using 4-0 Vicryl in a  running subcuticular fashion followed by Dermabond skin glue after further  more local anesthetic mixture was injected. A postoperative x-ray revealed adequate  placement of the Mediport without any complications. The patient tolerated  the procedure very well without any complications and was sent to the PACU in  stable condition.      ____________________________________________    Geoff Lange MD, FACS, FICS    7/6/2020  4:00 PM

## 2020-07-07 ENCOUNTER — ANESTHESIA (OUTPATIENT)
Dept: OPERATING ROOM | Age: 50
DRG: 982 | End: 2020-07-07
Payer: COMMERCIAL

## 2020-07-07 VITALS
HEIGHT: 64 IN | TEMPERATURE: 98 F | WEIGHT: 274.69 LBS | OXYGEN SATURATION: 95 % | HEART RATE: 60 BPM | SYSTOLIC BLOOD PRESSURE: 104 MMHG | RESPIRATION RATE: 18 BRPM | BODY MASS INDEX: 46.9 KG/M2 | DIASTOLIC BLOOD PRESSURE: 52 MMHG

## 2020-07-07 LAB
ALBUMIN SERPL-MCNC: 3.9 GM/DL (ref 3.4–5)
ALP BLD-CCNC: 102 IU/L (ref 40–128)
ALT SERPL-CCNC: 17 U/L (ref 10–40)
AMYLASE: 23 U/L (ref 25–115)
ANION GAP SERPL CALCULATED.3IONS-SCNC: 6 MMOL/L (ref 4–16)
AST SERPL-CCNC: 17 IU/L (ref 15–37)
BASOPHILS ABSOLUTE: 0 K/CU MM
BASOPHILS RELATIVE PERCENT: 0.6 % (ref 0–1)
BILIRUB SERPL-MCNC: 0.2 MG/DL (ref 0–1)
BUN BLDV-MCNC: 15 MG/DL (ref 6–23)
CALCIUM SERPL-MCNC: 9.9 MG/DL (ref 8.3–10.6)
CHLORIDE BLD-SCNC: 108 MMOL/L (ref 99–110)
CO2: 25 MMOL/L (ref 21–32)
CREAT SERPL-MCNC: 0.7 MG/DL (ref 0.6–1.1)
DIFFERENTIAL TYPE: ABNORMAL
EOSINOPHILS ABSOLUTE: 0 K/CU MM
EOSINOPHILS RELATIVE PERCENT: 0.2 % (ref 0–3)
GFR AFRICAN AMERICAN: >60 ML/MIN/1.73M2
GFR NON-AFRICAN AMERICAN: >60 ML/MIN/1.73M2
GLUCOSE BLD-MCNC: 106 MG/DL (ref 70–99)
HCT VFR BLD CALC: 34.7 % (ref 37–47)
HEMOGLOBIN: 10.8 GM/DL (ref 12.5–16)
IMMATURE NEUTROPHIL %: 0.4 % (ref 0–0.43)
LIPASE: 14 IU/L (ref 13–60)
LYMPHOCYTES ABSOLUTE: 1.2 K/CU MM
LYMPHOCYTES RELATIVE PERCENT: 23.9 % (ref 24–44)
MCH RBC QN AUTO: 26.7 PG (ref 27–31)
MCHC RBC AUTO-ENTMCNC: 31.1 % (ref 32–36)
MCV RBC AUTO: 85.7 FL (ref 78–100)
MONOCYTES ABSOLUTE: 0.2 K/CU MM
MONOCYTES RELATIVE PERCENT: 4.2 % (ref 0–4)
NUCLEATED RBC %: 0 %
PDW BLD-RTO: 13.5 % (ref 11.7–14.9)
PLATELET # BLD: 192 K/CU MM (ref 140–440)
PMV BLD AUTO: 9.7 FL (ref 7.5–11.1)
POTASSIUM SERPL-SCNC: 4.3 MMOL/L (ref 3.5–5.1)
RBC # BLD: 4.05 M/CU MM (ref 4.2–5.4)
SEGMENTED NEUTROPHILS ABSOLUTE COUNT: 3.6 K/CU MM
SEGMENTED NEUTROPHILS RELATIVE PERCENT: 70.7 % (ref 36–66)
SODIUM BLD-SCNC: 139 MMOL/L (ref 135–145)
TOTAL IMMATURE NEUTOROPHIL: 0.02 K/CU MM
TOTAL NUCLEATED RBC: 0 K/CU MM
TOTAL PROTEIN: 6.7 GM/DL (ref 6.4–8.2)
TOTAL RETICULOCYTE COUNT: 0.07 K/CU MM
WBC # BLD: 5.1 K/CU MM (ref 4–10.5)

## 2020-07-07 PROCEDURE — 83690 ASSAY OF LIPASE: CPT

## 2020-07-07 PROCEDURE — 94760 N-INVAS EAR/PLS OXIMETRY 1: CPT

## 2020-07-07 PROCEDURE — 85025 COMPLETE CBC W/AUTO DIFF WBC: CPT

## 2020-07-07 PROCEDURE — 80053 COMPREHEN METABOLIC PANEL: CPT

## 2020-07-07 PROCEDURE — 6360000002 HC RX W HCPCS: Performed by: SURGERY

## 2020-07-07 PROCEDURE — 6370000000 HC RX 637 (ALT 250 FOR IP): Performed by: SURGERY

## 2020-07-07 PROCEDURE — 82150 ASSAY OF AMYLASE: CPT

## 2020-07-07 PROCEDURE — C9113 INJ PANTOPRAZOLE SODIUM, VIA: HCPCS | Performed by: SURGERY

## 2020-07-07 RX ORDER — HEPARIN SODIUM (PORCINE) LOCK FLUSH IV SOLN 100 UNIT/ML 100 UNIT/ML
500 SOLUTION INTRAVENOUS PRN
Status: DISCONTINUED | OUTPATIENT
Start: 2020-07-07 | End: 2020-07-07 | Stop reason: HOSPADM

## 2020-07-07 RX ORDER — PROMETHAZINE HYDROCHLORIDE 25 MG/1
25 TABLET ORAL EVERY 6 HOURS PRN
Qty: 30 TABLET | Refills: 2 | Status: SHIPPED | OUTPATIENT
Start: 2020-07-07 | End: 2020-07-14

## 2020-07-07 RX ADMIN — GABAPENTIN 800 MG: 400 CAPSULE ORAL at 16:24

## 2020-07-07 RX ADMIN — OXYCODONE HYDROCHLORIDE AND ACETAMINOPHEN 2 TABLET: 5; 325 TABLET ORAL at 06:21

## 2020-07-07 RX ADMIN — PROMETHAZINE HYDROCHLORIDE 12.5 MG: 25 INJECTION INTRAMUSCULAR; INTRAVENOUS at 01:56

## 2020-07-07 RX ADMIN — Medication 500 UNITS: at 17:26

## 2020-07-07 RX ADMIN — ATENOLOL 25 MG: 25 TABLET ORAL at 09:48

## 2020-07-07 RX ADMIN — PANTOPRAZOLE SODIUM 40 MG: 40 INJECTION, POWDER, FOR SOLUTION INTRAVENOUS at 09:47

## 2020-07-07 RX ADMIN — ENOXAPARIN SODIUM 40 MG: 40 INJECTION SUBCUTANEOUS at 09:47

## 2020-07-07 RX ADMIN — OXYCODONE HYDROCHLORIDE AND ACETAMINOPHEN 2 TABLET: 5; 325 TABLET ORAL at 13:15

## 2020-07-07 RX ADMIN — TIZANIDINE 4 MG: 4 TABLET ORAL at 09:48

## 2020-07-07 RX ADMIN — OXYCODONE HYDROCHLORIDE AND ACETAMINOPHEN 2 TABLET: 5; 325 TABLET ORAL at 01:56

## 2020-07-07 RX ADMIN — PROMETHAZINE HYDROCHLORIDE 12.5 MG: 25 INJECTION INTRAMUSCULAR; INTRAVENOUS at 13:15

## 2020-07-07 RX ADMIN — LEVOTHYROXINE SODIUM 125 MCG: 125 TABLET ORAL at 05:13

## 2020-07-07 RX ADMIN — GABAPENTIN 800 MG: 400 CAPSULE ORAL at 09:48

## 2020-07-07 ASSESSMENT — PAIN DESCRIPTION - PAIN TYPE
TYPE: ACUTE PAIN
TYPE: CHRONIC PAIN

## 2020-07-07 ASSESSMENT — PAIN DESCRIPTION - FREQUENCY: FREQUENCY: INTERMITTENT

## 2020-07-07 ASSESSMENT — PAIN DESCRIPTION - PROGRESSION: CLINICAL_PROGRESSION: NOT CHANGED

## 2020-07-07 ASSESSMENT — PAIN SCALES - GENERAL
PAINLEVEL_OUTOF10: 7
PAINLEVEL_OUTOF10: 7
PAINLEVEL_OUTOF10: 6

## 2020-07-07 ASSESSMENT — PAIN DESCRIPTION - LOCATION: LOCATION: ABDOMEN

## 2020-07-07 ASSESSMENT — PAIN DESCRIPTION - ONSET: ONSET: ON-GOING

## 2020-07-07 ASSESSMENT — PAIN DESCRIPTION - DESCRIPTORS: DESCRIPTORS: ACHING

## 2020-07-07 NOTE — CARE COORDINATION
Reviewed chart and spoke with pt about discharge needs/plans. Pt lives with family members , who are able to assist with ADL's and transportation as needed. She has a PCP and insurance that covers medications. Discussed possible need for [facility] if needed she would like HC. Denies need for [facility]. at this time. CM will continue to follow.

## 2020-07-07 NOTE — DISCHARGE SUMMARY
Patient: Quiana Welch MD      Gender: female  : 1970   Age: 44 y.o. MRN: [id number]    Admitting Physician: Mauricio Andres MD  Discharge Physician: Mauricio Andres MD     Code Status: Full Code     Admit Date: 7/3/2020   Discharge Date: 20      Disposition:  Home       Condition at Discharge:  stable . Follow-up appointments:  f/u one week with PCP , and with consultants as recommended . Outpatient to do list: f/u       Discharge Diagnoses: Active Hospital Problems    Diagnosis    Intractable nausea and vomiting [R11.2]   1. Intractable nausea and vomiting. 2.  Chronic pancreatitis. 3.  Status post gastric sleeve surgery. 4.  Morbid obesity. 5.  Hypertension. 6.  Anxiety. REASON FOR ADMISSION:  Intractable nausea and vomiting.     HISTORY OF PRESENT ILLNESS:  The patient is a 61-year-old female who has  a history of morbid obesity, status post gastric surgery, also has a  history of chronic pancreatitis. The patient has chronic pain on and  off, for which the patient is taking Percocet; however, the patient  started having complaint of vomiting for last few days and continued to  get worse. Because of that, she came to the emergency room. The  patient has had a history of recent tooth infection, for which the  patient had tooth pulled and she was on amoxicillin. The patient had  some complaint of dizziness. She had abdominal pain, which has improved  at this time. The patient denies any history of chest pain at this  time. No shortness of breath. No fever or chills. The patient denies  any urinary complaints. Hospital Course:   hydration, and control N/V, her upper GI reviewed, surgical consult with input of and NO surgical abnormality,  Mediport placed. Consults.   IP CONSULT TO IV TEAM  IP CONSULT TO PRIMARY CARE PROVIDER  IP CONSULT TO IV TEAM        Discharge Medications:   Current Discharge Medication List        Current Discharge Medication List      CONTINUE these medications which have CHANGED    Details   promethazine (PHENERGAN) 25 MG tablet Take 1 tablet by mouth every 6 hours as needed for Nausea  Qty: 30 tablet, Refills: 2           Current Discharge Medication List      CONTINUE these medications which have NOT CHANGED    Details   LORazepam (ATIVAN) 1 MG tablet Take 1 mg by mouth as needed for Anxiety. pantoprazole (PROTONIX) 40 MG tablet Take 1 tablet by mouth every morning (before breakfast)  Qty: 90 tablet, Refills: 1      dicyclomine (BENTYL) 10 MG capsule Take 1 capsule by mouth 3 times daily as needed (abdominal pain)  Qty: 21 capsule, Refills: 3      oxyCODONE-acetaminophen (PERCOCET) 5-325 MG per tablet Take 1 tablet by mouth.  Every 4-6 hours PRN      ondansetron (ZOFRAN ODT) 4 MG disintegrating tablet Take 1 tablet by mouth every 8 hours as needed for Nausea or Vomiting  Qty: 30 tablet, Refills: 2      Cholecalciferol (VITAMIN D3) 5000 units TABS Take 1 tablet by mouth daily      tiZANidine (ZANAFLEX) 4 MG tablet Take 4 mg by mouth 2 times daily      estradiol (ESTRACE) 0.5 MG tablet Take 0.5 mg by mouth daily      atenolol (TENORMIN) 25 MG tablet Take 25 mg by mouth daily      Multiple Vitamin (MVI, BARIATRIC ADVANTAGE MULTI-FORMULA, CHEW TAB) Take 1 tablet by mouth daily  Qty: 30 tablet, Refills: 5      Calcium Citrate-Vitamin D (CALCIUM + VIT D, BARIATRIC ADVANTAGE, CHEWABLE TABLET) Take 1 tablet by mouth 2 times daily  Qty: 120 tablet, Refills: 5      levothyroxine (SYNTHROID) 125 MCG tablet Take 1 tablet by mouth Daily  Qty: 30 tablet, Refills: 0      gabapentin (NEURONTIN) 800 MG tablet Take 800 mg by mouth 3 times daily      PARoxetine (PAXIL) 30 MG tablet Take 50 mg by mouth nightly            Current Discharge Medication List      STOP taking these medications       levETIRAcetam (KEPPRA) 1000 MG tablet Comments:   Reason for Stopping:         lacosamide (VIMPAT) 50 MG TABS tablet Comments:   Reason for Stopping:               Discharge ROS:  A complete review of systems was asked and negative except for abd pain, nausea      Discharge Exam:    BP (!) 104/52   Pulse 60   Temp 98 °F (36.7 °C) (Oral)   Resp 18   Ht 5' 4\" (1.626 m)   Wt 274 lb 11.1 oz (124.6 kg)   SpO2 95%   BMI 47.15 kg/m²   General appearance:  NAD  Heart[de-identified] Normal s1/s2, RRR, no murmurs, gallops, or rubs. No leg edema  Lungs:  Clear to auscultation, bilaterally without Rales/Wheezes/Rhonchi. Abdomen: Soft, non-tender, non-distended, bowel sounds present  Musculoskeletal:   no cyanosis, no edema  Neurologic:  Cranial nerves: II-XII intact, grossly non-focal.  Psychiatric:  A & O x3      Labs: For convenience and continuity at follow-up the following most recent labs are provided:    Lab Results   Component Value Date    WBC 5.1 07/07/2020    HGB 10.8 07/07/2020    HCT 34.7 07/07/2020    MCV 85.7 07/07/2020     07/07/2020     07/07/2020    K 4.3 07/07/2020     07/07/2020    CO2 25 07/07/2020    BUN 15 07/07/2020    CREATININE 0.7 07/07/2020    CALCIUM 9.9 07/07/2020    PHOS 4.4 12/04/2015    BNP 88 11/26/2010    ALKPHOS 102 07/07/2020    ALT 17 07/07/2020    AST 17 07/07/2020    BILITOT 0.2 07/07/2020    BILIDIR 0.2 01/02/2016    LABALBU 3.9 07/07/2020    LABALBU 8 11/18/2015    TRIG 161 07/23/2015     Lab Results   Component Value Date    INR 0.93 01/10/2020    INR ? 01/09/2020    INR 0.93 10/31/2019           Chart review shows recent radiographs:  Ct Abdomen Pelvis Wo Contrast    Result Date: 7/3/2020  EXAMINATION: CT OF THE ABDOMEN AND PELVIS WITHOUT CONTRAST 7/3/2020 5:06 pm TECHNIQUE: CT of the abdomen and pelvis was performed without the administration of intravenous contrast. Multiplanar reformatted images are provided for review. Dose modulation, iterative reconstruction, and/or weight based adjustment of the mA/kV was utilized to reduce the radiation dose to as low as reasonably achievable.  COMPARISON: April 13, 2020 and July 14 20 HISTORY: ORDERING SYSTEM PROVIDED HISTORY: flank pain TECHNOLOGIST PROVIDED HISTORY: Reason for exam:->flank pain Is the patient pregnant?->No Reason for Exam: flank pain Acuity: Acute Type of Exam: Initial Additional signs and symptoms: abd pain/ nausea, hx of weight loss surgery (gastric sleeve), hx of vomitting and GI issues FINDINGS: Lower Chest: No evidence of pneumonia or other acute findings. Subcentimeter nodule in the right lung base, costophrenic angle near the spine again noted. Even smaller tiny nodules again noted elsewhere in the lung bases. Organs: No non contrast evidence of acute process of the organs of the abdomen. No evidence of pancreatitis. No ureteral stone or hydronephrosis. Chronic air in the biliary tree again noted. Cholecystectomy has been performed. GI/Bowel: No bowel obstruction or other non contrast acute process demonstrated. No evidence of colitis, diverticulitis or appendicitis. Postsurgical changes from gastric bypass again noted. Pelvis: No acute abnormality of the pelvis. Peritoneum/Retroperitoneum: No free air, free fluid or abscess. Bones/Soft Tissues: No fracture or other acute osseous process. No evidence of acute process in the abdomen or pelvis. Fl Less Than 1 Hour    Result Date: 7/6/2020  Radiology exam is complete. No Radiologist dictation. Please follow up with ordering provider. Xr Chest Portable    Result Date: 7/3/2020  EXAMINATION: ONE XRAY VIEW OF THE CHEST 7/3/2020 3:03 pm COMPARISON: Chest 03/01/2020 HISTORY: ORDERING SYSTEM PROVIDED HISTORY: pain or SOB TECHNOLOGIST PROVIDED HISTORY: Reason for exam:-> pain or SOB Reason for Exam: chest pain/sob Acuity: Acute Type of Exam: Initial Additional signs and symptoms: na Relevant Medical/Surgical History: COPD, H/O pneumonia FINDINGS: The cardiac silhouette is enlarged. The mediastinal and hilar silhouettes appear unremarkable. The lungs appear clear. No pleural effusion.  No and examined on day of discharge and this discharge summary is in conjunction with any daily progress note from day of discharge. Time Spent on discharge is   >35  min  in the examination, evaluation, counseling and review of medications and discharge plan.             Signed:    Teressa Gonzales MD   7/7/2020

## 2020-07-08 LAB
EKG ATRIAL RATE: 51 BPM
EKG DIAGNOSIS: NORMAL
EKG P AXIS: 52 DEGREES
EKG P-R INTERVAL: 206 MS
EKG Q-T INTERVAL: 436 MS
EKG QRS DURATION: 88 MS
EKG QTC CALCULATION (BAZETT): 401 MS
EKG R AXIS: -11 DEGREES
EKG T AXIS: 22 DEGREES
EKG VENTRICULAR RATE: 51 BPM

## 2020-07-10 ENCOUNTER — ANESTHESIA EVENT (OUTPATIENT)
Dept: ENDOSCOPY | Age: 50
DRG: 101 | End: 2020-07-10
Payer: COMMERCIAL

## 2020-07-14 ENCOUNTER — HOSPITAL ENCOUNTER (OUTPATIENT)
Dept: LAB | Age: 50
Discharge: HOME | End: 2020-07-14
Payer: COMMERCIAL

## 2020-07-14 ENCOUNTER — TELEPHONE (OUTPATIENT)
Dept: SURGERY | Age: 50
End: 2020-07-14

## 2020-07-14 PROCEDURE — U0002 COVID-19 LAB TEST NON-CDC: HCPCS

## 2020-07-16 ASSESSMENT — ENCOUNTER SYMPTOMS: SHORTNESS OF BREATH: 1

## 2020-07-16 NOTE — ANESTHESIA PRE PROCEDURE
Department of Anesthesiology  Preprocedure Note       Name:  Elias Hammond   Age:  44 y.o.  :  1970                                          MRN:  [id number]         Date:  2020      Surgeon: Dawit Maradiaga):  Francesca Medina MD    Procedure: Procedure(s):  EGD ESOPHAGOGASTRODUODENOSCOPY WITH BX    Medications prior to admission:   Prior to Admission medications    Medication Sig Start Date End Date Taking? Authorizing Provider   LORazepam (ATIVAN) 1 MG tablet Take 1 mg by mouth as needed for Anxiety. Historical Provider, MD   pantoprazole (PROTONIX) 40 MG tablet Take 1 tablet by mouth every morning (before breakfast) 20   Francesca Medina MD   dicyclomine (BENTYL) 10 MG capsule Take 1 capsule by mouth 3 times daily as needed (abdominal pain) 20   Christina English [place] CYRIL Caicedo   oxyCODONE-acetaminophen (PERCOCET) 5-325 MG per tablet Take 1 tablet by mouth.  Every 4-6 hours PRN    Historical Provider, MD   ondansetron (ZOFRAN ODT) 4 MG disintegrating tablet Take 1 tablet by mouth every 8 hours as needed for Nausea or Vomiting 19   Jessica Abreu MD   Cholecalciferol (VITAMIN D3) 5000 units TABS Take 1 tablet by mouth daily    Historical Provider, MD   tiZANidine (ZANAFLEX) 4 MG tablet Take 4 mg by mouth 2 times daily    Historical Provider, MD   estradiol (ESTRACE) 0.5 MG tablet Take 0.5 mg by mouth daily    Historical Provider, MD   atenolol (TENORMIN) 25 MG tablet Take 25 mg by mouth daily    Historical Provider, MD   Multiple Vitamin (MVI, BARIATRIC ADVANTAGE MULTI-FORMULA, CHEW TAB) Take 1 tablet by mouth daily 19   Francesca Medina MD   Calcium Citrate-Vitamin D (CALCIUM + VIT D, BARIATRIC ADVANTAGE, CHEWABLE TABLET) Take 1 tablet by mouth 2 times daily 19   Francesca Medina MD   levothyroxine (SYNTHROID) 125 MCG tablet Take 1 tablet by mouth Daily  Patient taking differently: Take 125 mcg by mouth nightly  18   Nicolasa Oscar MD   gabapentin (NEURONTIN) 800 MG tablet Take 800 mg by mouth 3 times daily    Historical Provider, MD   PARoxetine (PAXIL) 30 MG tablet Take 50 mg by mouth nightly     Historical Provider, MD       Current medications:    No current facility-administered medications for this encounter. Current Outpatient Medications   Medication Sig Dispense Refill    LORazepam (ATIVAN) 1 MG tablet Take 1 mg by mouth as needed for Anxiety.  pantoprazole (PROTONIX) 40 MG tablet Take 1 tablet by mouth every morning (before breakfast) 90 tablet 1    dicyclomine (BENTYL) 10 MG capsule Take 1 capsule by mouth 3 times daily as needed (abdominal pain) 21 capsule 3    oxyCODONE-acetaminophen (PERCOCET) 5-325 MG per tablet Take 1 tablet by mouth. Every 4-6 hours PRN      ondansetron (ZOFRAN ODT) 4 MG disintegrating tablet Take 1 tablet by mouth every 8 hours as needed for Nausea or Vomiting 30 tablet 2    Cholecalciferol (VITAMIN D3) 5000 units TABS Take 1 tablet by mouth daily      tiZANidine (ZANAFLEX) 4 MG tablet Take 4 mg by mouth 2 times daily      estradiol (ESTRACE) 0.5 MG tablet Take 0.5 mg by mouth daily      atenolol (TENORMIN) 25 MG tablet Take 25 mg by mouth daily      Multiple Vitamin (MVI, BARIATRIC ADVANTAGE MULTI-FORMULA, CHEW TAB) Take 1 tablet by mouth daily 30 tablet 5    Calcium Citrate-Vitamin D (CALCIUM + VIT D, BARIATRIC ADVANTAGE, CHEWABLE TABLET) Take 1 tablet by mouth 2 times daily 120 tablet 5    levothyroxine (SYNTHROID) 125 MCG tablet Take 1 tablet by mouth Daily (Patient taking differently: Take 125 mcg by mouth nightly ) 30 tablet 0    gabapentin (NEURONTIN) 800 MG tablet Take 800 mg by mouth 3 times daily      PARoxetine (PAXIL) 30 MG tablet Take 50 mg by mouth nightly          Allergies:     Allergies   Allergen Reactions    Aspirin Palpitations     \"My Heart Rate Elevates\"    Shellfish-Derived Products Swelling    Toradol [Ketorolac Tromethamine] Rash    Compazine [Prochlorperazine]     Reglan [Metoclopramide] Itching       Problem List:    Patient Active Problem List   Diagnosis Code    Fibromyalgia M79.7    Lupus (systemic lupus erythematosus) (HCC) M32.9    Chronic pancreatitis (HCC) K86.1    HTN (hypertension) I10    Generalized abdominal pain R10.84    Frequent UTI N39.0    Gastroesophageal reflux disease without esophagitis K21.9    Depression F32.9    Fatty liver disease, nonalcoholic G15.2    Arthritis M19.90    Bilateral low back pain with left-sided sciatica M54.42    Intractable vomiting with nausea R11.2    Hiatal hernia K44.9    Status post laparoscopic sleeve gastrectomy Z98.84    Pseudoseizure F44.5    Chronic pain syndrome G89.4    Drug-seeking/Aberrant behavior Z76.5    Lymph node disorder I89.9    Morbid obesity with BMI of 40.0-44.9, adult ([facility]) E66.01, Z68.41    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    COPD (chronic obstructive pulmonary disease) ([facility] [address]     Sees Dr. Jyoti Fitch Depression     Fibromyalgia Dx 2013    H/O echocardiogram 8/11/15    EF >55%. LA to be at the upper limit of normal in size. LV hypertrophy with normal LV systolic, but abnormal diastolic function. Normal valvular structures and function.      H/O echocardiogram 08/30/2018    EF 55-60%    Hiatal hernia     History of blood transfusion 09/2015 And 2018    No Reaction To Blood Transfusions Received    [place] (hard of hearing)     Right Ear    Hx of cardiac CEMENT performed by Lenny Mckinney DPM at [facility] Left Last Done In 2016     Dr. Karl Gudino, \" About 6 Surgeries Done Because Of Staph Infection\"    HIATAL HERNIA REPAIR N/A 2/12/2019    HERNIA HIATAL LAPAROSCOPIC ROBOTIC performed by Marielle Hurt MD at [facility]  10/1997    Partial Abdominal Hysterectomy    INSERTION / REMOVAL / REPLACEMENT VENOUS ACCESS CATHETER N/A 8/15/2019    PORT INSERTION performed by Marielle Hurt MD at [address] [facility] [address]    removed after 6 months    LUNG REMOVAL, PARTIAL Left 2008    Benign    OTHER SURGICAL HISTORY  02/1998    \"Tubes And Ovaries Removed, Appendectomy Also Done\"    OTHER SURGICAL HISTORY  Last Done 7-15-16    Mediport Insertion \"Total Of Six Done, Removed Last Mediport In 2014\"    PORT SURGERY N/A 1/15/2020    PORT REMOVAL performed by Marielle Hurt MD at [address] [place] [address] N/A 7/6/2020    PORT INSERTION performed by Marielle Hurt MD at [facility] N/A 2/12/2019    GASTRECTOMY SLEEVE LAPAROSCOPIC ROBOTIC performed by Marielle Hurt MD at [address] [place] [address]  08/27/2018    UPPER GASTROINTESTINAL ENDOSCOPY N/A 4/2/2019    EGD CONTROL HEMORRHAGE with epi injection at bleeding site performed by Marielle Hurt MD at [place] [address] 6/19/2019    EGD DIAGNOSTIC ONLY performed by Marielle Hurt MD at [facility] 19 N/A 7/22/2019    EGD DIAGNOSTIC ONLY performed by Edilia Hi MD at [facility] 19 N/A 10/14/2019    EGD DIAGNOSTIC ONLY performed by Marielle Hurt MD at [address] [place] [address] History:    Social History     Tobacco Use    Smoking status: Never Smoker    Smokeless tobacco: Never Used   Substance Use Topics    Alcohol use: No     Alcohol/week: 0.0 standard drinks Counseling given: Not Answered      Vital Signs (Current):   Vitals:    07/15/20 1252   Weight: 260 lb (117.9 kg)   Height: 5' 4\" (1.626 m)                                              BP Readings from Last 3 Encounters:   07/06/20 103/62   07/07/20 (!) 104/52   04/13/20 116/86       NPO Status:                                                                                 BMI:   Wt Readings from Last 3 Encounters:   07/03/20 274 lb 11.1 oz (124.6 kg)   04/13/20 260 lb (117.9 kg)   03/06/20 261 lb (118.4 kg)     Body mass index is 44.63 kg/m². CBC:   Lab Results   Component Value Date    WBC 5.1 07/07/2020    RBC 4.05 07/07/2020    HGB 10.8 07/07/2020    HCT 34.7 07/07/2020    MCV 85.7 07/07/2020    RDW 13.5 07/07/2020     07/07/2020       CMP:   Lab Results   Component Value Date     07/07/2020    K 4.3 07/07/2020     07/07/2020    CO2 25 07/07/2020    BUN 15 07/07/2020    CREATININE 0.7 07/07/2020    GFRAA >60 07/07/2020    LABGLOM >60 07/07/2020    GLUCOSE 106 07/07/2020    PROT 6.7 07/07/2020    PROT 6.5 11/18/2011    CALCIUM 9.9 07/07/2020    BILITOT 0.2 07/07/2020    ALKPHOS 102 07/07/2020    AST 17 07/07/2020    ALT 17 07/07/2020       POC Tests: No results for input(s): POCGLU, POCNA, POCK, POCCL, POCBUN, POCHEMO, POCHCT in the last 72 hours.     Coags:   Lab Results   Component Value Date    PROTIME 11.2 01/10/2020    PROTIME 11.4 12/01/2010    INR 0.93 01/10/2020    APTT 32.5 01/10/2020       HCG (If Applicable):   Lab Results   Component Value Date    PREGTESTUR NEGATIVE 07/10/2017        ABGs: No results found for: PHART, PO2ART, BRR1EOT, MEY2HWX, BEART, S0VGSMIQ     Type & Screen (If Applicable):  No results found for: LABABO, LABRH    Drug/Infectious Status (If Applicable):  Lab Results   Component Value Date    HEPCAB 0.18 11/18/2015       COVID-19 Screening (If Applicable):   Lab Results   Component Value Date    COVID19 NOT DETECTED 07/03/2020         Anesthesia Evaluation  Patient summary reviewed and Nursing notes reviewed no history of anesthetic complications:   Airway: Mallampati: I  TM distance: >3 FB   Neck ROM: full  Mouth opening: > = 3 FB Dental:          Pulmonary: breath sounds clear to auscultation  (+) pneumonia:  COPD:  shortness of breath:  sleep apnea:                             Cardiovascular:  Exercise tolerance: good (>4 METS),   (+) hypertension:, PARSONS:,       ECG reviewed  Rhythm: regular  Rate: normal  Echocardiogram reviewed         Beta Blocker:  Dose within 24 Hrs      ROS comment: Sinus bradycardia   Otherwise normal ECG   When compared with ECG of 01-MAR-2020 05:57,   No significant change was found   Confirmed by Fabian Corona MD, Melinda Gifford ([id number]) on 7/4/2020 2:11:57 PM      Summary   Left ventricular systolic function is normal.   Ejection fraction is visually estimated at 55%. Aneurysmal interatrial septum with positive bubble study. No evidence of thrombus within the left atrial appendage. Moderate to severe tricuspid regurgitation is present. No evidence of any pericardial effusion. No evidence of endocarditis. Signature      ------------------------------------------------------------------   Electronically signed by Clau Atkins MD   (Interpreting physician) on 01/16/2020 at 05:53 PM   ------------------------------------------------------------------        Neuro/Psych:   (+) psychiatric history:depression/anxiety             GI/Hepatic/Renal:   (+) hiatal hernia, GERD:, PUD, liver disease:, morbid obesity          Endo/Other:    (+) hypothyroidism: arthritis:., .                 Abdominal:   (+) obese,         Vascular:                                      Anesthesia Plan      general and MAC     ASA 3     (COVID results pending)  Induction: intravenous. Anesthetic plan and risks discussed with patient. Plan discussed with CRNA.     Attending anesthesiologist reviewed and agrees with Pre Eval content     Pre Anesthesia Assessment complete.  Chart reviewed on 7/16/2020        MICHAEL Luna - CRNA   7/16/2020

## 2020-07-17 ENCOUNTER — ANESTHESIA (OUTPATIENT)
Dept: ENDOSCOPY | Age: 50
DRG: 101 | End: 2020-07-17
Payer: COMMERCIAL

## 2020-07-17 ENCOUNTER — HOSPITAL ENCOUNTER (INPATIENT)
Age: 50
LOS: 1 days | Discharge: HOME | DRG: 101 | End: 2020-07-19
Attending: SURGERY | Admitting: SURGERY
Payer: COMMERCIAL

## 2020-07-17 ENCOUNTER — APPOINTMENT (OUTPATIENT)
Dept: CT IMAGING | Age: 50
DRG: 101 | End: 2020-07-17
Attending: SURGERY
Payer: COMMERCIAL

## 2020-07-17 VITALS
OXYGEN SATURATION: 97 % | SYSTOLIC BLOOD PRESSURE: 104 MMHG | DIASTOLIC BLOOD PRESSURE: 75 MMHG | RESPIRATION RATE: 18 BRPM

## 2020-07-17 PROBLEM — R56.9 SEIZURE (HCC): Status: ACTIVE | Noted: 2020-07-17

## 2020-07-17 PROCEDURE — C1726 CATH, BAL DIL, NON-VASCULAR: HCPCS | Performed by: SURGERY

## 2020-07-17 PROCEDURE — 0D768ZZ DILATION OF STOMACH, VIA NATURAL OR ARTIFICIAL OPENING ENDOSCOPIC: ICD-10-PCS | Performed by: SURGERY

## 2020-07-17 PROCEDURE — 6360000002 HC RX W HCPCS: Performed by: NURSE ANESTHETIST, CERTIFIED REGISTERED

## 2020-07-17 PROCEDURE — 6370000000 HC RX 637 (ALT 250 FOR IP): Performed by: SURGERY

## 2020-07-17 PROCEDURE — 95819 EEG AWAKE AND ASLEEP: CPT

## 2020-07-17 PROCEDURE — 3700000000 HC ANESTHESIA ATTENDED CARE: Performed by: SURGERY

## 2020-07-17 PROCEDURE — 7100000001 HC PACU RECOVERY - ADDTL 15 MIN: Performed by: SURGERY

## 2020-07-17 PROCEDURE — 6360000002 HC RX W HCPCS: Performed by: SURGERY

## 2020-07-17 PROCEDURE — 2000000000 HC ICU R&B

## 2020-07-17 PROCEDURE — 2500000003 HC RX 250 WO HCPCS: Performed by: NURSE ANESTHETIST, CERTIFIED REGISTERED

## 2020-07-17 PROCEDURE — 7100000000 HC PACU RECOVERY - FIRST 15 MIN: Performed by: SURGERY

## 2020-07-17 PROCEDURE — G0378 HOSPITAL OBSERVATION PER HR: HCPCS

## 2020-07-17 PROCEDURE — 43195 ESOPHAGOSCOPY RIGID BALLOON: CPT | Performed by: SURGERY

## 2020-07-17 PROCEDURE — 2580000003 HC RX 258: Performed by: SURGERY

## 2020-07-17 PROCEDURE — 3609017700 HC EGD DILATION GASTRIC/DUODENAL STRICTURE: Performed by: SURGERY

## 2020-07-17 PROCEDURE — 76937 US GUIDE VASCULAR ACCESS: CPT

## 2020-07-17 PROCEDURE — 3700000001 HC ADD 15 MINUTES (ANESTHESIA): Performed by: SURGERY

## 2020-07-17 PROCEDURE — 0D7A8ZZ DILATION OF JEJUNUM, VIA NATURAL OR ARTIFICIAL OPENING ENDOSCOPIC: ICD-10-PCS | Performed by: SURGERY

## 2020-07-17 PROCEDURE — 6360000002 HC RX W HCPCS: Performed by: INTERNAL MEDICINE

## 2020-07-17 PROCEDURE — 70450 CT HEAD/BRAIN W/O DYE: CPT

## 2020-07-17 PROCEDURE — 6360000002 HC RX W HCPCS

## 2020-07-17 PROCEDURE — 99223 1ST HOSP IP/OBS HIGH 75: CPT | Performed by: NURSE PRACTITIONER

## 2020-07-17 PROCEDURE — 2709999900 HC NON-CHARGEABLE SUPPLY: Performed by: SURGERY

## 2020-07-17 RX ORDER — SODIUM CHLORIDE 0.9 % (FLUSH) 0.9 %
10 SYRINGE (ML) INJECTION PRN
Status: DISCONTINUED | OUTPATIENT
Start: 2020-07-17 | End: 2020-07-19 | Stop reason: HOSPADM

## 2020-07-17 RX ORDER — LEVOTHYROXINE SODIUM 0.12 MG/1
125 TABLET ORAL DAILY
Status: DISCONTINUED | OUTPATIENT
Start: 2020-07-17 | End: 2020-07-19 | Stop reason: HOSPADM

## 2020-07-17 RX ORDER — TIZANIDINE 4 MG/1
4 TABLET ORAL 2 TIMES DAILY
Status: DISCONTINUED | OUTPATIENT
Start: 2020-07-17 | End: 2020-07-19 | Stop reason: HOSPADM

## 2020-07-17 RX ORDER — SODIUM CHLORIDE, SODIUM LACTATE, POTASSIUM CHLORIDE, CALCIUM CHLORIDE 600; 310; 30; 20 MG/100ML; MG/100ML; MG/100ML; MG/100ML
INJECTION, SOLUTION INTRAVENOUS CONTINUOUS
Status: DISCONTINUED | OUTPATIENT
Start: 2020-07-17 | End: 2020-07-19 | Stop reason: HOSPADM

## 2020-07-17 RX ORDER — MIDAZOLAM HYDROCHLORIDE 1 MG/ML
INJECTION INTRAMUSCULAR; INTRAVENOUS
Status: COMPLETED
Start: 2020-07-17 | End: 2020-07-17

## 2020-07-17 RX ORDER — SODIUM CHLORIDE 0.9 % (FLUSH) 0.9 %
10 SYRINGE (ML) INJECTION EVERY 12 HOURS SCHEDULED
Status: DISCONTINUED | OUTPATIENT
Start: 2020-07-17 | End: 2020-07-19 | Stop reason: HOSPADM

## 2020-07-17 RX ORDER — MORPHINE SULFATE 2 MG/ML
2 INJECTION, SOLUTION INTRAMUSCULAR; INTRAVENOUS EVERY 4 HOURS PRN
Status: DISCONTINUED | OUTPATIENT
Start: 2020-07-17 | End: 2020-07-19 | Stop reason: HOSPADM

## 2020-07-17 RX ORDER — ONDANSETRON 4 MG/1
4 TABLET, ORALLY DISINTEGRATING ORAL EVERY 8 HOURS PRN
Status: DISCONTINUED | OUTPATIENT
Start: 2020-07-17 | End: 2020-07-19 | Stop reason: HOSPADM

## 2020-07-17 RX ORDER — LORAZEPAM 1 MG/1
1 TABLET ORAL PRN
Status: DISCONTINUED | OUTPATIENT
Start: 2020-07-17 | End: 2020-07-19 | Stop reason: HOSPADM

## 2020-07-17 RX ORDER — LORAZEPAM 2 MG/ML
INJECTION INTRAMUSCULAR
Status: COMPLETED
Start: 2020-07-17 | End: 2020-07-17

## 2020-07-17 RX ORDER — LORAZEPAM 2 MG/ML
1 INJECTION INTRAMUSCULAR ONCE
Status: COMPLETED | OUTPATIENT
Start: 2020-07-17 | End: 2020-07-17

## 2020-07-17 RX ORDER — LIDOCAINE HYDROCHLORIDE 20 MG/ML
INJECTION, SOLUTION INFILTRATION; PERINEURAL PRN
Status: DISCONTINUED | OUTPATIENT
Start: 2020-07-17 | End: 2020-07-17 | Stop reason: SDUPTHER

## 2020-07-17 RX ORDER — ONDANSETRON 2 MG/ML
4 INJECTION INTRAMUSCULAR; INTRAVENOUS ONCE
Status: COMPLETED | OUTPATIENT
Start: 2020-07-17 | End: 2020-07-17

## 2020-07-17 RX ORDER — MIDAZOLAM HYDROCHLORIDE 1 MG/ML
INJECTION INTRAMUSCULAR; INTRAVENOUS PRN
Status: DISCONTINUED | OUTPATIENT
Start: 2020-07-17 | End: 2020-07-17 | Stop reason: SDUPTHER

## 2020-07-17 RX ORDER — PROPOFOL 10 MG/ML
INJECTION, EMULSION INTRAVENOUS PRN
Status: DISCONTINUED | OUTPATIENT
Start: 2020-07-17 | End: 2020-07-17 | Stop reason: SDUPTHER

## 2020-07-17 RX ORDER — PROMETHAZINE HYDROCHLORIDE 25 MG/ML
12.5 INJECTION, SOLUTION INTRAMUSCULAR; INTRAVENOUS EVERY 6 HOURS PRN
Status: DISCONTINUED | OUTPATIENT
Start: 2020-07-17 | End: 2020-07-19 | Stop reason: HOSPADM

## 2020-07-17 RX ORDER — MAGNESIUM SULFATE 1 G/100ML
1 INJECTION INTRAVENOUS PRN
Status: DISCONTINUED | OUTPATIENT
Start: 2020-07-17 | End: 2020-07-19 | Stop reason: HOSPADM

## 2020-07-17 RX ORDER — ATENOLOL 25 MG/1
25 TABLET ORAL DAILY
Status: DISCONTINUED | OUTPATIENT
Start: 2020-07-17 | End: 2020-07-19 | Stop reason: HOSPADM

## 2020-07-17 RX ORDER — POTASSIUM CHLORIDE 7.45 MG/ML
10 INJECTION INTRAVENOUS PRN
Status: DISCONTINUED | OUTPATIENT
Start: 2020-07-17 | End: 2020-07-19 | Stop reason: HOSPADM

## 2020-07-17 RX ORDER — SODIUM CHLORIDE, SODIUM LACTATE, POTASSIUM CHLORIDE, CALCIUM CHLORIDE 600; 310; 30; 20 MG/100ML; MG/100ML; MG/100ML; MG/100ML
INJECTION, SOLUTION INTRAVENOUS ONCE
Status: COMPLETED | OUTPATIENT
Start: 2020-07-17 | End: 2020-07-17

## 2020-07-17 RX ADMIN — LORAZEPAM 1 MG: 2 INJECTION INTRAMUSCULAR; INTRAVENOUS at 11:44

## 2020-07-17 RX ADMIN — SODIUM CHLORIDE, POTASSIUM CHLORIDE, SODIUM LACTATE AND CALCIUM CHLORIDE: 600; 310; 30; 20 INJECTION, SOLUTION INTRAVENOUS at 09:19

## 2020-07-17 RX ADMIN — PAROXETINE 50 MG: 10 TABLET, FILM COATED ORAL at 22:23

## 2020-07-17 RX ADMIN — TIZANIDINE 4 MG: 4 TABLET ORAL at 22:22

## 2020-07-17 RX ADMIN — MORPHINE SULFATE 2 MG: 2 INJECTION, SOLUTION INTRAMUSCULAR; INTRAVENOUS at 22:17

## 2020-07-17 RX ADMIN — PROPOFOL 20 MG: 10 INJECTION, EMULSION INTRAVENOUS at 09:26

## 2020-07-17 RX ADMIN — LIDOCAINE HYDROCHLORIDE 200 MG: 20 INJECTION, SOLUTION INFILTRATION; PERINEURAL at 09:23

## 2020-07-17 RX ADMIN — SODIUM CHLORIDE, POTASSIUM CHLORIDE, SODIUM LACTATE AND CALCIUM CHLORIDE: 600; 310; 30; 20 INJECTION, SOLUTION INTRAVENOUS at 13:00

## 2020-07-17 RX ADMIN — PROPOFOL 20 MG: 10 INJECTION, EMULSION INTRAVENOUS at 09:25

## 2020-07-17 RX ADMIN — PROPOFOL 80 MG: 10 INJECTION, EMULSION INTRAVENOUS at 09:23

## 2020-07-17 RX ADMIN — PROPOFOL 20 MG: 10 INJECTION, EMULSION INTRAVENOUS at 09:30

## 2020-07-17 RX ADMIN — PROPOFOL 40 MG: 10 INJECTION, EMULSION INTRAVENOUS at 09:34

## 2020-07-17 RX ADMIN — LORAZEPAM 1 MG: 2 INJECTION INTRAMUSCULAR at 11:44

## 2020-07-17 RX ADMIN — PROPOFOL 20 MG: 10 INJECTION, EMULSION INTRAVENOUS at 09:24

## 2020-07-17 RX ADMIN — ONDANSETRON 4 MG: 4 TABLET, ORALLY DISINTEGRATING ORAL at 16:13

## 2020-07-17 RX ADMIN — SODIUM CHLORIDE, POTASSIUM CHLORIDE, SODIUM LACTATE AND CALCIUM CHLORIDE: 600; 310; 30; 20 INJECTION, SOLUTION INTRAVENOUS at 16:09

## 2020-07-17 RX ADMIN — PROPOFOL 20 MG: 10 INJECTION, EMULSION INTRAVENOUS at 09:32

## 2020-07-17 RX ADMIN — MIDAZOLAM 2 MG: 1 INJECTION INTRAMUSCULAR; INTRAVENOUS at 10:06

## 2020-07-17 RX ADMIN — PROMETHAZINE HYDROCHLORIDE 12.5 MG: 25 INJECTION INTRAMUSCULAR; INTRAVENOUS at 22:17

## 2020-07-17 RX ADMIN — ONDANSETRON HYDROCHLORIDE 4 MG: 2 SOLUTION INTRAMUSCULAR; INTRAVENOUS at 11:08

## 2020-07-17 RX ADMIN — PROPOFOL 20 MG: 10 INJECTION, EMULSION INTRAVENOUS at 09:29

## 2020-07-17 ASSESSMENT — PAIN DESCRIPTION - DESCRIPTORS
DESCRIPTORS: ACHING;DISCOMFORT
DESCRIPTORS: ACHING;DULL

## 2020-07-17 ASSESSMENT — PAIN DESCRIPTION - PAIN TYPE: TYPE: CHRONIC PAIN

## 2020-07-17 ASSESSMENT — PAIN DESCRIPTION - FREQUENCY: FREQUENCY: CONTINUOUS

## 2020-07-17 ASSESSMENT — PAIN SCALES - GENERAL
PAINLEVEL_OUTOF10: 0
PAINLEVEL_OUTOF10: 7

## 2020-07-17 ASSESSMENT — PAIN DESCRIPTION - PROGRESSION: CLINICAL_PROGRESSION: NOT CHANGED

## 2020-07-17 ASSESSMENT — PAIN DESCRIPTION - ORIENTATION: ORIENTATION: RIGHT;LOWER

## 2020-07-17 ASSESSMENT — PAIN - FUNCTIONAL ASSESSMENT
PAIN_FUNCTIONAL_ASSESSMENT: 0-10
PAIN_FUNCTIONAL_ASSESSMENT: ACTIVITIES ARE NOT PREVENTED

## 2020-07-17 ASSESSMENT — PAIN DESCRIPTION - ONSET: ONSET: ON-GOING

## 2020-07-17 ASSESSMENT — PAIN DESCRIPTION - LOCATION: LOCATION: ABDOMEN

## 2020-07-17 NOTE — PROGRESS NOTES
0951CALLED MARKELL RN IN PACU TO ADVISE OF NEED FOR MS GORAN TO COME THERE FOR MONITORING, PERIPHERAL IV HAD INFILTRATED NEAR END OF PROCEDURE, HAS MEDIPORT TO BE ACCESSED (NO ONE TO ACCESS IN ENDOSCOPY AS REQUESTED BY CRNA. ADVISED SHE IS C/O NAUSEA.

## 2020-07-17 NOTE — ANESTHESIA POSTPROCEDURE EVALUATION
Department of Anesthesiology  Postprocedure Note    Patient: Meseret Dougherty  MRN: [id number]  YOB: 1970  Date of evaluation: 7/17/2020  Time:  2:35 PM     Procedure Summary     Date:  07/17/20 Room / Location:  [address]    Anesthesia Start:  0705 Anesthesia Stop:  1010    Procedure:  EGJ DILATATION BALLOON WITH 18-20 MM BALLOON, DILATED TO 20 MM. (N/A ) Diagnosis:  (vomiting with nausea)    Surgeon:  Yue Myrick MD Responsible Provider:  MICHAEL Johnson CRNA    Anesthesia Type:  general, MAC ASA Status:  3          Anesthesia Type: general, MAC    Anay Phase I: Anay Score: 8    Anay Phase II:      Last vitals: Reviewed and per EMR flowsheets.        Anesthesia Post Evaluation    Patient location during evaluation: PACU  Patient participation: complete - patient participated  Level of consciousness: awake  Airway patency: patent  Nausea & Vomiting: no vomiting and no nausea  Complications: no  Cardiovascular status: blood pressure returned to baseline and hemodynamically stable  Respiratory status: acceptable, nonlabored ventilation and spontaneous ventilation  Hydration status: stable

## 2020-07-17 NOTE — PROGRESS NOTES
Mediport left upper chest warm to touch and incisional area is red in appearance. Corey Wilson R.N. attempted to access mediport with a staley needle. No blood  Could be drawn. Staley needle removed and dressing applied.

## 2020-07-17 NOTE — CARE COORDINATION
Patient unable to participate after seizure. Chart reviewed- patient has had 5 admissions and several ED visits that did not result in admission. She lives in [facility] with her mother; originally from [place]. She has a PCP locally and insurance that assists with Rx when needed. She has received care from [address] - IV TRU. CM will follow for discharge needs.  Nigel Hampton RN

## 2020-07-17 NOTE — CONSULTS
2/12/2019    HERNIA HIATAL LAPAROSCOPIC ROBOTIC performed by Juani Ramirez MD at [address]  10/1997    Partial Abdominal Hysterectomy    INSERTION / REMOVAL / REPLACEMENT VENOUS ACCESS CATHETER N/A 8/15/2019    PORT INSERTION performed by Juani Ramirez MD at [address] [facility]    removed after 6 months    LUNG REMOVAL, PARTIAL Left 2008    Benign    OTHER SURGICAL HISTORY  02/1998    \"Tubes And Ovaries Removed, Appendectomy Also Done\"    OTHER SURGICAL HISTORY  Last Done 7-15-16    Mediport Insertion \"Total Of Six Done, Removed Last Mediport In 2014\"    PORT SURGERY N/A 1/15/2020    PORT REMOVAL performed by Juani Ramirez MD at [address] [place] [address] N/A 7/6/2020    PORT INSERTION performed by Juani Ramirez MD at [facility] N/A 2/12/2019    GASTRECTOMY SLEEVE LAPAROSCOPIC ROBOTIC performed by Juani Ramirez MD at [address]  08/27/2018    UPPER GASTROINTESTINAL ENDOSCOPY N/A 4/2/2019    EGD CONTROL HEMORRHAGE with epi injection at bleeding site performed by Juani Ramirez MD at [place] [address] 6/19/2019    EGD DIAGNOSTIC ONLY performed by Juani Ramirez MD at [place] [address] 7/22/2019    EGD DIAGNOSTIC ONLY performed by Zhang Cohen MD at [place] [address] 10/14/2019    EGD DIAGNOSTIC ONLY performed by Juani Ramirez MD at [facility] N/A 7/17/2020    EGJ DILATATION BALLOON WITH 18-20 MM BALLOON, DILATED TO 20 MM. performed by Juani Ramirez MD at [facility] ENDOSCOPY     Medications:  Scheduled Meds:   atenolol  25 mg Oral Daily    levothyroxine  125 mcg Oral Daily    PARoxetine  50 mg Oral Nightly    tiZANidine  4 mg Oral BID    sodium chloride flush  10 mL Intravenous 2 times per day     Continuous Infusions:   lactated ringers 100 mL/hr at 07/17/20 1300     PRN Meds:. LORazepam, ondansetron, sodium chloride flush, potassium chloride, magnesium sulfate    Allergies   Allergen Reactions    Aspirin Palpitations     \"My Heart Rate Elevates\"    Shellfish-Derived Products Swelling    Toradol [Ketorolac Tromethamine] Rash    Compazine [Prochlorperazine]     Reglan [Metoclopramide] Itching     Social History     Socioeconomic History    Marital status:      Spouse name: Not on file    Number of children: Not on file    Years of education: Not on file    Highest education level: Not on file   Occupational History    Not on file   Social Needs    Financial resource strain: Not on file    Food insecurity     Worry: Not on file     Inability: Not on file    Transportation needs     Medical: Not on file     Non-medical: Not on file   Tobacco Use    Smoking status: Never Smoker    Smokeless tobacco: Never Used   Substance and Sexual Activity    Alcohol use: No     Alcohol/week: 0.0 standard drinks    Drug use: No    Sexual activity: Not Currently   Lifestyle    Physical activity     Days per week: Not on file     Minutes per session: Not on file    Stress: Not on file   Relationships    Social connections     Talks on phone: Not on file     Gets together: Not on file     Attends Sikh service: Not on file     Active member of club or organization: Not on file     Attends meetings of clubs or organizations: Not on file     Relationship status: Not on file    Intimate partner violence     Fear of current or ex partner: Not on file     Emotionally abused: Not on file     Physically abused: Not on file     Forced sexual activity: Not on file   Other Topics Concern    Not on file   Social History Narrative    Not on file      Family History   Problem Relation Age of Onset    Diabetes Mother         \"Borderline Diabetes\"    High Blood Pressure Mother     Obesity Mother     Arthritis Mother  Heart Disease Mother     High Cholesterol Mother     Vision Loss Mother     Diabetes Father     High Blood Pressure Father     Asthma Father     Cancer Father         prostate cancer    High Blood Pressure Brother     Asthma Son     Vision Loss Son     Lupus Daughter     Other Daughter         \"Alot Of Female Problems\"       Review of Symptoms:    Due to current state of encephalopathy no cannot answer ROS. Physical Exam:         Gen: encephalopathic   HEENT: NC/AT, EOMI, PERRL, mmm,  neck supple, no meningeal signs; Heart: SB, regular   Lungs: no distress  Ext: no edema, no calf tenderness b/l  Psych: normal mood and affect  Skin: no rashes or lesions    NEUROLOGIC EXAM:    Mental Status: encephalopathic, attempts to follow commands but with sedation she falls back asleep cannot keep conversation but few words said they are clear with no dysarthria and no apparent aphasia     Cranial Nerve Exam:   CN II-XII:  Grossly intact with no gaze deviation, pupil equal and reactive, face symmetric bilaterally     Motor Exam/Pain:    Grimaces and withdrawals to pain x4  Follows commands x4    Deep Tendon Reflexes: 1/4 biceps, triceps, brachioradialis, patellar, and achilles b/l; flexor plantar responses b/l        Coordination/Cerebellum:       Tremors--none    Gait and stance:      Gait: deferred      LABS:   No results for input(s): WBC, HEMOGLOBIN, NA, K, CL, CO2, BUN, CREATININE, GLUCOSE, ALBUMIN, INR, PTT, CPK, CKMB, ESR in the last 72 hours. Invalid input(s): HEMATOCRIT, PLATELETS, CA, PT, CK1, TROP, BNAPGETLIPIDS@        IMAGING:      CT head pending  EEG pending     ASSESSMENT/PLAN:     3 44year old female with acute seizure activity post sedation, suspicion for seizure v. Hypoperfusion v. PNES superimposed on acute sinus bradycardia. Plan of care as follows:  1. Neuro Exam:  1.  Post ictal and post sedation at bedside with no cranial nerve deficits, withdrawal to pain and has no UMN findings. 2. Neurodiagnostics:  1. CT head pending   2. EEG pending  3. Medications:  1. Hold AED until EEG and course of admission   4. PT/OT/ST:  1. Per their recommendations   5. Follow up:  1. Pending completion of neurodiagnostics, no improvement tomorrow then I will order MRI          Thank you for allowing us to participate in the care of your patient. If there are any questions regarding evaluation please feel free to contact us. MICHAEL Amos - CNP, 7/17/2020  Attending Note:  I have rounded on this patient with Sunita Apodaca CNP. I have reviewed the chart and we have discussed this case in detail. The patient was seen and examined by myself. Pertinent labs and imaging have been personally reviewed. Our findings and impressions were discussed with the patient. I concur with the Nurse Practioner's assessment and plan.     Lenore Trejo,  7/20/2020 5:12 PM

## 2020-07-17 NOTE — PROGRESS NOTES
EEG started at this time. Pt not very cooperative with commands because she is upset that she cannot have pain meds at this time. Pt just received Zofran for nausea, but expresses she would rather have Phenergan.

## 2020-07-17 NOTE — PROGRESS NOTES
1792- Pt arrived to PACU from endo. Pt on left side complaining of nausea, pt handed basin. This nurse witnessed tt eyes rolled back and jaw tighten. Pt began convulsing oxygen saturations 80%.  at bedside and used BMV to ventilate pt to increase oxygenation to 99%.  at bedside to access Mediport. 2mg versed given IVP by . Convulsions stopped and pt reached up to remove mask from face. PT placed on 6L/ simple mask. Will continue to monitor. 1041-  at bedside to evaluate. Pt able to answer questions slowly but appropriately. 1110- pts mom updated via telephone with patients permission. 1143-pt voided in bed pan and turned side to side for partial bed bath and partial linen change. After re-positioning pt complaining of head not feeling right stating \"somethingis happening\" pt rolled onto left side and began convulsing. Pt unresponsive during event. Airway secure and vital signs stable.  called bedside. No change in vital signs, ativan given IVP see eMAR.  updated, new orders received to transfer pt to ICU for closer observation. 1152-Pt able to open eyes and follow simple commands. Pt complains of stomach pain, returns to sleep without intervention. 1230-pt to ICU report given to Atilio Gotti RN at bedside. Attempted to call pts mom to update but she did not answer the phone.

## 2020-07-17 NOTE — H&P
HISTORY AND PHYSICAL EXAM    Date of Admission:  2020    PCP:  Santosh Goodson MD    Chief Complaint / History of Present Illness:   Nguyễn Klein is a very pleasant 44 y.o. female who presents with pain in the abdomen, but with nausea, vomiting and dehydration. . recurrent. Thomasena Dilling and is chronic, worsening and dull compared to patient's normal condition. It is severe in intensity for a duration of 2 days, but on and off for many months now. . and is intermittent with modifying factors increased by or worse with eating. .  Had a h/o RYGB and needs an upper GI with Small bowel follow through to r/o a surgical cause of her symptoms. .    PMH:   has a past medical history of Acid reflux, Anemia, Anxiety, Arthritis, Bleeding ulcer (), Bronchitis (Last Episode ), Chronic back pain, Chronic pain, COPD (chronic obstructive pulmonary disease) ([facility] [address] Depression, Fibromyalgia (Dx ), H/O echocardiogram (8/11/15), H/O echocardiogram (2018), Hiatal hernia, History of blood transfusion (2015 And ), [place] (hard of hearing), cardiac catheterization (10/24/2010), transesophageal echocardiography (PILO) for monitoring (2010), OTHER MEDICAL, OTHER MEDICAL, Hypertension, Kidney cysts, Lupus ([address] (Dx ), MDRO (multiple drug resistant organisms) resistance, Morbid obesity ([facility] [address] MRSA bacteremia, Nausea, Pain management, Pancreatitis chronic (Dx ), Pneumonia (Dx 11-15), Shortness of breath on exertion, Shortness of breath on exertion, Sleep apnea, Staph infection (Dx 11-15), Staph infection (Dx 11-15), Teeth missing, Thyroid disease, and Wears glasses. PSH:   has a past surgical history that includes Lung removal, partial (Left, );  section (1991); Foot surgery (Left, Last Done In ); Dental surgery; Cholecystectomy, laparoscopic (8927'Z); other surgical history (1998); other surgical history (Last Done 7-15-16); Tonsillectomy and adenoidectomy ();  Appendectomy (02/1998); Upper gastrointestinal endoscopy (08/27/2018); Lap Band (~2000); Hysterectomy (10/1997); Endoscopy, colon, diagnostic (Last Done In 2018); Colonoscopy (Last Done In 2000's); Cardiac catheterization (10/24/2010); Sleeve Gastrectomy (N/A, 2/12/2019); hiatal hernia repair (N/A, 2/12/2019); Upper gastrointestinal endoscopy (N/A, 4/2/2019); Foot Debridement (Left, 6/16/2019); Upper gastrointestinal endoscopy (N/A, 6/19/2019); Catheter Removal (N/A, 7/16/2019); Upper gastrointestinal endoscopy (N/A, 7/22/2019); INSERTION / REMOVAL / REPLACEMENT VENOUS ACCESS CATHETER (N/A, 8/15/2019); Upper gastrointestinal endoscopy (N/A, 10/14/2019); [address] Surgery (N/A, 1/15/2020); and Port Surgery (N/A, 7/6/2020). Allergies: Allergies   Allergen Reactions    Aspirin Palpitations     \"My Heart Rate Elevates\"    Shellfish-Derived Products Swelling    Toradol [Ketorolac Tromethamine] Rash    Compazine [Prochlorperazine]     Reglan [Metoclopramide] Itching        Home Meds:    Prior to Admission medications    Medication Sig Start Date End Date Taking? Authorizing Provider   LORazepam (ATIVAN) 1 MG tablet Take 1 mg by mouth as needed for Anxiety.    Yes Historical Provider, MD   pantoprazole (PROTONIX) 40 MG tablet Take 1 tablet by mouth every morning (before breakfast) 5/6/20  Yes Juani Ramirez MD   dicyclomine (BENTYL) 10 MG capsule Take 1 capsule by mouth 3 times daily as needed (abdominal pain) 4/13/20  Yes Kiersten Reyes PA-C   ondansetron (ZOFRAN ODT) 4 MG disintegrating tablet Take 1 tablet by mouth every 8 hours as needed for Nausea or Vomiting 7/23/19  Yes Uriel Koehler MD   Cholecalciferol (VITAMIN D3) 5000 units TABS Take 1 tablet by mouth daily   Yes Historical Provider, MD   tiZANidine (ZANAFLEX) 4 MG tablet Take 4 mg by mouth 2 times daily   Yes Historical Provider, MD   estradiol (ESTRACE) 0.5 MG tablet Take 0.5 mg by mouth daily   Yes Historical Provider, MD   atenolol (TENORMIN) 25 MG tablet Take 25 mg by mouth daily   Yes Historical Provider, MD   Multiple Vitamin (MVI, BARIATRIC ADVANTAGE MULTI-FORMULA, CHEW TAB) Take 1 tablet by mouth daily 2/22/19  Yes Argelia Heard MD   levothyroxine (SYNTHROID) 125 MCG tablet Take 1 tablet by mouth Daily  Patient taking differently: Take 125 mcg by mouth nightly  8/8/18  Yes Jeffy Barillas MD   gabapentin (NEURONTIN) 800 MG tablet Take 800 mg by mouth 3 times daily   Yes Historical Provider, MD   PARoxetine (PAXIL) 30 MG tablet Take 50 mg by mouth nightly    Yes Historical Provider, MD   oxyCODONE-acetaminophen (PERCOCET) 5-325 MG per tablet Take 1 tablet by mouth.  Every 4-6 hours PRN    Historical Provider, MD   Calcium Citrate-Vitamin D (CALCIUM + VIT D, BARIATRIC ADVANTAGE, CHEWABLE TABLET) Take 1 tablet by mouth 2 times daily 2/22/19   Argelia Heard MD        [facility] Meds:    Current Facility-Administered Medications   Medication Dose Route Frequency Provider Last Rate Last Dose    lactated ringers infusion   Intravenous Once Argelia Heard MD          lactated ringers         Social History / Family History:        Social History     Socioeconomic History    Marital status:      Spouse name: None    Number of children: None    Years of education: None    Highest education level: None   Occupational History    None   Social Needs    Financial resource strain: None    Food insecurity     Worry: None     Inability: None    Transportation needs     Medical: None     Non-medical: None   Tobacco Use    Smoking status: Never Smoker    Smokeless tobacco: Never Used   Substance and Sexual Activity    Alcohol use: No     Alcohol/week: 0.0 standard drinks    Drug use: No    Sexual activity: Not Currently   Lifestyle    Physical activity     Days per week: None     Minutes per session: None    Stress: None   Relationships    Social connections     Talks on phone: None     Gets together: None     Attends Sabianist service: None Body mass index is 44.63 kg/m². General appearance: Pt Alert and oriented, to persons place and time, in no apparent acute distress. HEENT:  KATE, EOMI, Conjunctivae clear. No JVDs, Bruits, No thyroid-megaly. Lungs: No dyspnea. Clear to auscultation bilaterally. Heart: Regular rate and rhythm, S1, S2 normal, no murmur, rub or gallop. No legs edema. Abdomen: Non tender. Non distended. Positive bowel sounds. No hernias noted, no masses palpable. Extremities: Warm, well perfused, no cyanosis or edema. Skin: Skin color, texture, turgor normal.  Neurologic: Grossly normal, Cranial nerves from II to XII intact. Deep tendon reflexes normal.  Psychology: Mood stable. Lymph nodes: No palpable LN in the neck, abdomen, or groins. Radiologic / Imaging / TESTING  Ct Abdomen Pelvis Wo Contrast    Result Date: 7/3/2020  EXAMINATION: CT OF THE ABDOMEN AND PELVIS WITHOUT CONTRAST 7/3/2020 5:06 pm TECHNIQUE: CT of the abdomen and pelvis was performed without the administration of intravenous contrast. Multiplanar reformatted images are provided for review. Dose modulation, iterative reconstruction, and/or weight based adjustment of the mA/kV was utilized to reduce the radiation dose to as low as reasonably achievable. COMPARISON: April 13, 2020 and July 14 20 HISTORY: ORDERING SYSTEM PROVIDED HISTORY: flank pain TECHNOLOGIST PROVIDED HISTORY: Reason for exam:->flank pain Is the patient pregnant?->No Reason for Exam: flank pain Acuity: Acute Type of Exam: Initial Additional signs and symptoms: abd pain/ nausea, hx of weight loss surgery (gastric sleeve), hx of vomitting and GI issues FINDINGS: Lower Chest: No evidence of pneumonia or other acute findings. Subcentimeter nodule in the right lung base, costophrenic angle near the spine again noted. Even smaller tiny nodules again noted elsewhere in the lung bases. Organs: No non contrast evidence of acute process of the organs of the abdomen.   No evidence of pancreatitis. No ureteral stone or hydronephrosis. Chronic air in the biliary tree again noted. Cholecystectomy has been performed. GI/Bowel: No bowel obstruction or other non contrast acute process demonstrated. No evidence of colitis, diverticulitis or appendicitis. Postsurgical changes from gastric bypass again noted. Pelvis: No acute abnormality of the pelvis. Peritoneum/Retroperitoneum: No free air, free fluid or abscess. Bones/Soft Tissues: No fracture or other acute osseous process. No evidence of acute process in the abdomen or pelvis. Fl Less Than 1 Hour    Result Date: 7/6/2020  Radiology exam is complete. No Radiologist dictation. Please follow up with ordering provider. Xr Chest Portable    Result Date: 7/3/2020  EXAMINATION: ONE XRAY VIEW OF THE CHEST 7/3/2020 3:03 pm COMPARISON: Chest 03/01/2020 HISTORY: ORDERING SYSTEM PROVIDED HISTORY: pain or SOB TECHNOLOGIST PROVIDED HISTORY: Reason for exam:-> pain or SOB Reason for Exam: chest pain/sob Acuity: Acute Type of Exam: Initial Additional signs and symptoms: na Relevant Medical/Surgical History: COPD, H/O pneumonia FINDINGS: The cardiac silhouette is enlarged. The mediastinal and hilar silhouettes appear unremarkable. The lungs appear clear. No pleural effusion. No pneumothorax is seen. No acute osseous abnormality is identified. No radiographic evidence of acute pulmonary disease. Cardiomegaly. Fl Ugi    Result Date: 7/6/2020  EXAMINATION: SINGLE CONTRAST UPPER GI SERIES 7/6/2020 HISTORY: ORDERING SYSTEM PROVIDED HISTORY: WATER SOLUBLE CONTRAST please - r/o gastro-jejunal anastomotic leak - DO NOT OVER STRETCH the pouch please TECHNOLOGIST PROVIDED HISTORY: Reason for exam:->WATER SOLUBLE CONTRAST please - r/o gastro-jejunal anastomotic leak - DO NOT OVER STRETCH the pouch please Reason for Exam: - r/o gastro-jejunal anastomotic leak COMPARISON: None.  TECHNIQUE: Multiple single contrast images of the esophagus, gastroesophageal junction and stomach were obtained following the oral administration of water soluble contrast FLUOROSCOPY DOSE AND TYPE OR TIME AND EXPOSURES: Total fluoroscopy time was 0.9 minutes. Dose area product was 970 micro trish per square meter. The entrance dose was 54.0 mGy. FINDINGS: The preliminary AP supine KUB demonstrated no obstruction or ileus. Metallic surgical clips were noted from prior cholecystectomy. Phlebolith like calcifications were noted in the pelvis. The patient was given 2 test doses of none diluted Gastrografin. Each dose consisted of 1 mouthful. These were performed in the standing left posterior oblique position under fluoroscopy. Immediate vomiting was elicited with each test dose. No esophageal stricture was noted. No gastric sleeve anastomotic leak was noted. No gastric sleeve-jejunal obstruction was identified. Due to severe vomiting, the patient was not able to tolerate any further investigation. The patient was only able to tolerate 2 mouthfuls of non diluted Gastrografin. Both episodes elicited severe vomiting. No esophageal stricture was identified. No gastro jejunal anastomotic leak. Labs:    No results found for this or any previous visit (from the past 24 hour(s)).       Diagnosis:  Patient Active Problem List   Diagnosis    Fibromyalgia    Lupus (systemic lupus erythematosus) (HCC)    Chronic pancreatitis (HCC)    HTN (hypertension)    Generalized abdominal pain    Frequent UTI    Gastroesophageal reflux disease without esophagitis    Depression    Fatty liver disease, nonalcoholic    Arthritis    Bilateral low back pain with left-sided sciatica    Intractable vomiting with nausea    Hiatal hernia    Status post laparoscopic sleeve gastrectomy    Pseudoseizure    Chronic pain syndrome    Drug-seeking/Aberrant behavior    Lymph node disorder    Morbid obesity with BMI of 40.0-44.9, adult (HCC)    Iron deficiency anemia secondary to blood loss (chronic)    Encounter for weight management    Intractable nausea and vomiting           Assessment & Plan:     Madalyn Molina is a very pleasant 44 y.o. female who presents with pain in the abdomen, but with nausea, vomiting and dehydration. . recurrent. Chavo Kins and is chronic, worsening and dull compared to patient's normal condition. It is severe in intensity for a duration of 2 days, but on and off for many months now. . and is intermittent with modifying factors increased by or worse with eating. .  Had a h/o RYGB and needs an upper GI with Small bowel follow through to r/o a surgical cause of her symptoms. .    For EGJ today.    ____________________________________________    Ericka Lyles MD, FACS, FICS    7/17/2020  8:53 AM

## 2020-07-17 NOTE — CONSULTS
Consult completed. Procedure/rationale explained to pt & consent obtained. #20ga 45mm-long PIV initiated to RUE AC using sterile, UltraSound-guided technique. Sterile dressing with SkinPrep & SwabCap applied. Pt tolerated well and CRYSTAL RN notified of successful IV access for OutPt EGD.

## 2020-07-18 LAB
ANION GAP SERPL CALCULATED.3IONS-SCNC: 9 MMOL/L (ref 4–16)
BASOPHILS ABSOLUTE: 0 K/CU MM
BASOPHILS RELATIVE PERCENT: 0.8 % (ref 0–1)
BUN BLDV-MCNC: 10 MG/DL (ref 6–23)
CALCIUM SERPL-MCNC: 10.3 MG/DL (ref 8.3–10.6)
CHLORIDE BLD-SCNC: 104 MMOL/L (ref 99–110)
CO2: 29 MMOL/L (ref 21–32)
CREAT SERPL-MCNC: 0.8 MG/DL (ref 0.6–1.1)
DIFFERENTIAL TYPE: ABNORMAL
EOSINOPHILS ABSOLUTE: 0.2 K/CU MM
EOSINOPHILS RELATIVE PERCENT: 4.2 % (ref 0–3)
GFR AFRICAN AMERICAN: >60 ML/MIN/1.73M2
GFR NON-AFRICAN AMERICAN: >60 ML/MIN/1.73M2
GLUCOSE BLD-MCNC: 91 MG/DL (ref 70–99)
HCT VFR BLD CALC: 35.4 % (ref 37–47)
HEMOGLOBIN: 11.1 GM/DL (ref 12.5–16)
IMMATURE NEUTROPHIL %: 0.2 % (ref 0–0.43)
LYMPHOCYTES ABSOLUTE: 1.8 K/CU MM
LYMPHOCYTES RELATIVE PERCENT: 33.5 % (ref 24–44)
MCH RBC QN AUTO: 26.6 PG (ref 27–31)
MCHC RBC AUTO-ENTMCNC: 31.4 % (ref 32–36)
MCV RBC AUTO: 84.9 FL (ref 78–100)
MONOCYTES ABSOLUTE: 0.4 K/CU MM
MONOCYTES RELATIVE PERCENT: 6.7 % (ref 0–4)
NUCLEATED RBC %: 0 %
PDW BLD-RTO: 13.9 % (ref 11.7–14.9)
PLATELET # BLD: 199 K/CU MM (ref 140–440)
PMV BLD AUTO: 9.8 FL (ref 7.5–11.1)
POTASSIUM SERPL-SCNC: 4.5 MMOL/L (ref 3.5–5.1)
RBC # BLD: 4.17 M/CU MM (ref 4.2–5.4)
SARS-COV-2: NOT DETECTED
SEGMENTED NEUTROPHILS ABSOLUTE COUNT: 2.9 K/CU MM
SEGMENTED NEUTROPHILS RELATIVE PERCENT: 54.6 % (ref 36–66)
SODIUM BLD-SCNC: 142 MMOL/L (ref 135–145)
SOURCE: NORMAL
TOTAL IMMATURE NEUTOROPHIL: 0.01 K/CU MM
TOTAL NUCLEATED RBC: 0 K/CU MM
WBC # BLD: 5.3 K/CU MM (ref 4–10.5)

## 2020-07-18 PROCEDURE — 6370000000 HC RX 637 (ALT 250 FOR IP): Performed by: SURGERY

## 2020-07-18 PROCEDURE — 6360000002 HC RX W HCPCS: Performed by: SURGERY

## 2020-07-18 PROCEDURE — 96375 TX/PRO/DX INJ NEW DRUG ADDON: CPT

## 2020-07-18 PROCEDURE — 94150 VITAL CAPACITY TEST: CPT

## 2020-07-18 PROCEDURE — 80048 BASIC METABOLIC PNL TOTAL CA: CPT

## 2020-07-18 PROCEDURE — 85025 COMPLETE CBC W/AUTO DIFF WBC: CPT

## 2020-07-18 PROCEDURE — 2580000003 HC RX 258: Performed by: SURGERY

## 2020-07-18 PROCEDURE — G0378 HOSPITAL OBSERVATION PER HR: HCPCS

## 2020-07-18 PROCEDURE — 96376 TX/PRO/DX INJ SAME DRUG ADON: CPT

## 2020-07-18 PROCEDURE — 99024 POSTOP FOLLOW-UP VISIT: CPT | Performed by: SURGERY

## 2020-07-18 PROCEDURE — 96374 THER/PROPH/DIAG INJ IV PUSH: CPT

## 2020-07-18 PROCEDURE — 95819 EEG AWAKE AND ASLEEP: CPT | Performed by: PSYCHIATRY & NEUROLOGY

## 2020-07-18 RX ADMIN — PROMETHAZINE HYDROCHLORIDE 12.5 MG: 25 INJECTION INTRAMUSCULAR; INTRAVENOUS at 15:16

## 2020-07-18 RX ADMIN — PROMETHAZINE HYDROCHLORIDE 12.5 MG: 25 INJECTION INTRAMUSCULAR; INTRAVENOUS at 23:25

## 2020-07-18 RX ADMIN — MORPHINE SULFATE 2 MG: 2 INJECTION, SOLUTION INTRAMUSCULAR; INTRAVENOUS at 15:16

## 2020-07-18 RX ADMIN — LORAZEPAM 1 MG: 1 TABLET ORAL at 21:39

## 2020-07-18 RX ADMIN — ATENOLOL 25 MG: 25 TABLET ORAL at 07:53

## 2020-07-18 RX ADMIN — MORPHINE SULFATE 2 MG: 2 INJECTION, SOLUTION INTRAMUSCULAR; INTRAVENOUS at 11:20

## 2020-07-18 RX ADMIN — SODIUM CHLORIDE, PRESERVATIVE FREE 10 ML: 5 INJECTION INTRAVENOUS at 21:40

## 2020-07-18 RX ADMIN — SODIUM CHLORIDE, PRESERVATIVE FREE 10 ML: 5 INJECTION INTRAVENOUS at 08:30

## 2020-07-18 RX ADMIN — PAROXETINE 50 MG: 10 TABLET, FILM COATED ORAL at 21:39

## 2020-07-18 RX ADMIN — TIZANIDINE 4 MG: 4 TABLET ORAL at 21:39

## 2020-07-18 RX ADMIN — MORPHINE SULFATE 2 MG: 2 INJECTION, SOLUTION INTRAMUSCULAR; INTRAVENOUS at 02:29

## 2020-07-18 RX ADMIN — MORPHINE SULFATE 2 MG: 2 INJECTION, SOLUTION INTRAMUSCULAR; INTRAVENOUS at 06:35

## 2020-07-18 RX ADMIN — MORPHINE SULFATE 2 MG: 2 INJECTION, SOLUTION INTRAMUSCULAR; INTRAVENOUS at 23:25

## 2020-07-18 RX ADMIN — SODIUM CHLORIDE, PRESERVATIVE FREE 10 ML: 5 INJECTION INTRAVENOUS at 23:26

## 2020-07-18 RX ADMIN — PROMETHAZINE HYDROCHLORIDE 12.5 MG: 25 INJECTION INTRAMUSCULAR; INTRAVENOUS at 06:35

## 2020-07-18 RX ADMIN — SODIUM CHLORIDE, POTASSIUM CHLORIDE, SODIUM LACTATE AND CALCIUM CHLORIDE: 600; 310; 30; 20 INJECTION, SOLUTION INTRAVENOUS at 03:23

## 2020-07-18 RX ADMIN — TIZANIDINE 4 MG: 4 TABLET ORAL at 07:53

## 2020-07-18 RX ADMIN — MORPHINE SULFATE 2 MG: 2 INJECTION, SOLUTION INTRAMUSCULAR; INTRAVENOUS at 19:26

## 2020-07-18 RX ADMIN — LEVOTHYROXINE SODIUM 125 MCG: 125 TABLET ORAL at 06:42

## 2020-07-18 RX ADMIN — SODIUM CHLORIDE, POTASSIUM CHLORIDE, SODIUM LACTATE AND CALCIUM CHLORIDE: 600; 310; 30; 20 INJECTION, SOLUTION INTRAVENOUS at 23:25

## 2020-07-18 ASSESSMENT — PAIN SCALES - GENERAL
PAINLEVEL_OUTOF10: 3
PAINLEVEL_OUTOF10: 7
PAINLEVEL_OUTOF10: 6
PAINLEVEL_OUTOF10: 6
PAINLEVEL_OUTOF10: 0
PAINLEVEL_OUTOF10: 7
PAINLEVEL_OUTOF10: 7
PAINLEVEL_OUTOF10: 2
PAINLEVEL_OUTOF10: 7
PAINLEVEL_OUTOF10: 6
PAINLEVEL_OUTOF10: 6

## 2020-07-18 ASSESSMENT — PAIN - FUNCTIONAL ASSESSMENT
PAIN_FUNCTIONAL_ASSESSMENT: ACTIVITIES ARE NOT PREVENTED

## 2020-07-18 ASSESSMENT — PAIN DESCRIPTION - DESCRIPTORS
DESCRIPTORS: ACHING;DISCOMFORT
DESCRIPTORS: ACHING;DISCOMFORT
DESCRIPTORS: DULL
DESCRIPTORS: DULL;ACHING
DESCRIPTORS: DULL;ACHING

## 2020-07-18 ASSESSMENT — PAIN DESCRIPTION - LOCATION
LOCATION: ABDOMEN

## 2020-07-18 ASSESSMENT — PAIN DESCRIPTION - ONSET
ONSET: GRADUAL
ONSET: ON-GOING
ONSET: GRADUAL

## 2020-07-18 ASSESSMENT — PAIN DESCRIPTION - PAIN TYPE
TYPE: CHRONIC PAIN

## 2020-07-18 ASSESSMENT — PAIN DESCRIPTION - FREQUENCY
FREQUENCY: INTERMITTENT
FREQUENCY: INTERMITTENT
FREQUENCY: CONTINUOUS

## 2020-07-18 ASSESSMENT — PAIN DESCRIPTION - PROGRESSION
CLINICAL_PROGRESSION: GRADUALLY WORSENING
CLINICAL_PROGRESSION: NOT CHANGED
CLINICAL_PROGRESSION: GRADUALLY WORSENING
CLINICAL_PROGRESSION: GRADUALLY WORSENING

## 2020-07-18 ASSESSMENT — PAIN DESCRIPTION - ORIENTATION
ORIENTATION: RIGHT;LOWER
ORIENTATION: MID;RIGHT
ORIENTATION: MID;RIGHT
ORIENTATION: RIGHT;MID
ORIENTATION: RIGHT;LOWER

## 2020-07-18 NOTE — PROGRESS NOTES
Comprehensive Nutrition Assessment    Type and Reason for Visit:  Initial, Positive Nutrition Screen(wt loss, N/V)    Nutrition Recommendations/Plan:   · Continue current Full Liquid Diet  · Begin low liam high protein oral nutrition supplement bid, between meals    Nutrition Assessment:  H/O RYGB with N/V. No wt loss noted over past yr. No improvement in intake yet this morning on Full Liquids with nausea. Malnutrition Assessment:  Malnutrition Status:  No malnutrition      Estimated Daily Nutrient Needs:  Energy (kcal):  1856; Weight Used for Energy Requirements:  Current Protein (g):  54-65 (1-1.2 g/kg); Weight Used for Protein Requirements:  Ideal        Fluid (ml/day):  1856 (1 ml/kcal); Weight Used for Fluid Requirements:  Current      Wounds:  None       Current Nutrition Therapies:    DIET FULL LIQUID;     Anthropometric Measures:  · Height: 5' 4\" (162.6 cm)  · Current Body Weight: 276 lb 14.4 oz (125.6 kg)   · Admission Body Weight: 276 lb 14.4 oz (125.6 kg)    · Usual Body Weight: 277 lb (125.6 kg)(7/14/19)     · Ideal Body Weight: 120 lbs    · BMI: 47.5  · BMI Categories: Obese Class 3 (BMI 40.0 or greater)       Nutrition Diagnosis:   · Inadequate oral intake related to pain as evidenced by poor intake prior to admission, nausea    Nutrition Interventions:   Food and/or Nutrient Delivery:  Continue Current Diet, Mineral Supplement, Vitamin Supplement, Start Oral Nutrition Supplement  Nutrition Education/Counseling:  No recommendation at this time   Coordination of Nutrition Care:  Continued Inpatient Monitoring    Goals:  Pt will consume at least 50% of meals and supplements dos       Nutrition Monitoring and Evaluation:   Food/Nutrient Intake Outcomes:  Food and Nutrient Intake, Supplement Intake  Physical Signs/Symptoms Outcomes:  Biochemical Data, Nausea or Vomiting, GI Status, Weight     Discharge Planning:    No discharge needs at this time     Electronically signed by Jasmyn García RD, LD

## 2020-07-18 NOTE — PROGRESS NOTES
GENERAL SURGERY PROGRESS NOTE    Kylah Olivarez is a 44 y.o. female post procedure day 1 after EGD with dilation and subsequent seizure like activity. Subjective:  Reports headache and abdominal pain this morning. Abdominal pain is epigastric radiating to the back. Nausea is improved. Objective:    Vitals: VITALS:  /81   Pulse 55   Temp 98.6 °F (37 °C) (Oral)   Resp 20   Ht 5' 4\" (1.626 m)   Wt 276 lb 14.4 oz (125.6 kg)   SpO2 95%   Breastfeeding No   BMI 47.53 kg/m²     I/O: 07/17 0701 - 07/18 0700  In: 2339 [I.V.:2339]  Out: 750 [Urine:750]    Labs/Imaging Results:   Lab Results   Component Value Date     07/18/2020    K 4.5 07/18/2020     07/18/2020    CO2 29 07/18/2020    BUN 10 07/18/2020    CREATININE 0.8 07/18/2020    GLUCOSE 91 07/18/2020    CALCIUM 10.3 07/18/2020      Lab Results   Component Value Date    WBC 5.3 07/18/2020    HGB 11.1 (L) 07/18/2020    HCT 35.4 (L) 07/18/2020    MCV 84.9 07/18/2020     07/18/2020       IV Fluids: lactated ringers Last Rate: 100 mL/hr at 07/18/20 1782    Scheduled Meds:   atenolol, 25 mg, Oral, Daily    levothyroxine, 125 mcg, Oral, Daily    PARoxetine, 50 mg, Oral, Nightly    tiZANidine, 4 mg, Oral, BID    sodium chloride flush, 10 mL, Intravenous, 2 times per day    Physical Exam:  General: A&O x 3, no distress. HEENT: Anicteric sclerae, MMM.E.  Abdomen: Soft, mildly tender in the epigastrium/RUQ. Remainder of abdomen benign. Assessment and Plan:  44 y.o. female s/p EGD with dilation and subsequent seizure like activity on 7/17. No further seizures but endorses epigastric pain and headache with congestion. Head CT negative. EEG recommended by Neurology.       Patient Active Problem List:     Fibromyalgia     Lupus (systemic lupus erythematosus) (HCC)     Chronic pancreatitis (HCC)     HTN (hypertension)     Generalized abdominal pain     Frequent UTI     Gastroesophageal reflux disease without esophagitis     Depression     Fatty liver disease, nonalcoholic     Arthritis     Bilateral low back pain with left-sided sciatica     Intractable vomiting with nausea     Hiatal hernia     Status post laparoscopic sleeve gastrectomy     Pseudoseizure     Chronic pain syndrome     Drug-seeking/Aberrant behavior     Lymph node disorder     Morbid obesity with BMI of 40.0-44.9, adult (HCC)     Iron deficiency anemia secondary to blood loss (chronic)     Encounter for weight management     Intractable nausea and vomiting     History of Jet-en-Y gastric bypass     Seizure [facility])      Appreciate Neurology and Hospitalist input  OK for discharge from a surgical perspective once Neuro workup is complete    Shay Marie MD  7/18/2020  11:59 AM

## 2020-07-18 NOTE — PLAN OF CARE
Problem: Skin Integrity:  Goal: Will show no infection signs and symptoms  Description: Will show no infection signs and symptoms  7/18/2020 0017 by Malorie Pack RN  Outcome: Ongoing  7/17/2020 2005 by Humberto Wooten RN  Outcome: Ongoing  Goal: Absence of new skin breakdown  Description: Absence of new skin breakdown  7/18/2020 0017 by Malorie Pack RN  Outcome: Ongoing  7/17/2020 2005 by Humberto Wooten RN  Outcome: Ongoing     Problem: Pain:  Description: Pain management should include both nonpharmacologic and pharmacologic interventions.   Goal: Pain level will decrease  Description: Pain level will decrease  7/18/2020 0017 by Malorie Pack RN  Outcome: Ongoing  7/17/2020 2005 by Humberto Wooten RN  Outcome: Ongoing  Goal: Control of acute pain  Description: Control of acute pain  7/18/2020 0017 by Malorie Pack RN  Outcome: Ongoing  7/17/2020 2005 by Humberto Wooten RN  Outcome: Ongoing  Goal: Control of chronic pain  Description: Control of chronic pain  7/18/2020 0017 by Malorie Pack RN  Outcome: Ongoing  7/17/2020 2005 by Humberto Wooten RN  Outcome: Ongoing     Problem: Coping:  Goal: Ability to cope will improve  Description: Ability to cope will improve  7/18/2020 0017 by Malorie Pack RN  Outcome: Ongoing  7/17/2020 2005 by Humberto Wooten RN  Outcome: Ongoing  Goal: Ability to identify appropriate support needs will improve  Description: Ability to identify appropriate support needs will improve  7/18/2020 0017 by Malorie Pack RN  Outcome: Ongoing  7/17/2020 2005 by Humberto Wooten RN  Outcome: Ongoing     Problem: Health Behavior:  Goal: Ability to manage health-related needs will improve  Description: Ability to manage health-related needs will improve  7/18/2020 0017 by Malorie Pack RN  Outcome: Ongoing  7/17/2020 2005 by Humberto Wooten RN  Outcome: Ongoing     Problem: Physical Regulation:  Goal: Signs of adequate cerebral perfusion will increase  Description: Signs of adequate cerebral perfusion will increase  7/18/2020 0017 by Marisela Troncoso RN  Outcome: Ongoing  7/17/2020 2005 by Evangelina Sanchez RN  Outcome: Ongoing  Goal: Ability to maintain a stable neurologic state will improve  Description: Ability to maintain a stable neurologic state will improve  7/18/2020 0017 by Marisela Troncoso RN  Outcome: Ongoing  7/17/2020 2005 by Evangelina Sanchez RN  Outcome: Ongoing     Problem: Safety:  Goal: Ability to remain free from injury will improve  Description: Ability to remain free from injury will improve  7/18/2020 0017 by Marisela Troncoso RN  Outcome: Ongoing  7/17/2020 2005 by Evangelina Sanchez RN  Outcome: Ongoing     Problem: Self-Concept:  Goal: Level of anxiety will decrease  Description: Level of anxiety will decrease  7/18/2020 0017 by Marisela Troncoso RN  Outcome: Ongoing  7/17/2020 2005 by Evangelina Sanchez RN  Outcome: Ongoing  Goal: Ability to verbalize feelings about condition will improve  Description: Ability to verbalize feelings about condition will improve  7/18/2020 0017 by Marisela Troncoso RN  Outcome: Ongoing  7/17/2020 2005 by Evangelina Sanchez RN  Outcome: Ongoing

## 2020-07-18 NOTE — PROGRESS NOTES
Patient off the floor during my rounding   Apparently transferred out of ICU  EEG tech Batool Gamboa reported to me after EEG completed patient talking and asking for pain medications. Will see tomorrow, awaiting EEG results. Thank you  Marielle Ceja CNP

## 2020-07-18 NOTE — FLOWSHEET NOTE
Ambulated to bathroom. Tolerated well. No seizure activity this shift. Will continue to monitor closely.      Aaron Ortega RN

## 2020-07-18 NOTE — PROGRESS NOTES
Dr. Magalis Staples at bedside for daily rounds. Pt complaining of abd pain and headache. MD tang with pt DC or transferring from ICU.

## 2020-07-19 VITALS
SYSTOLIC BLOOD PRESSURE: 180 MMHG | TEMPERATURE: 98.7 F | HEIGHT: 64 IN | HEART RATE: 69 BPM | OXYGEN SATURATION: 95 % | WEIGHT: 276.9 LBS | RESPIRATION RATE: 18 BRPM | DIASTOLIC BLOOD PRESSURE: 99 MMHG | BODY MASS INDEX: 47.27 KG/M2

## 2020-07-19 PROCEDURE — 99226 PR SBSQ OBSERVATION CARE/DAY 35 MINUTES: CPT | Performed by: NURSE PRACTITIONER

## 2020-07-19 PROCEDURE — 6370000000 HC RX 637 (ALT 250 FOR IP): Performed by: SURGERY

## 2020-07-19 PROCEDURE — 96376 TX/PRO/DX INJ SAME DRUG ADON: CPT

## 2020-07-19 PROCEDURE — 2580000003 HC RX 258: Performed by: SURGERY

## 2020-07-19 PROCEDURE — 1200000000 HC SEMI PRIVATE

## 2020-07-19 PROCEDURE — 99024 POSTOP FOLLOW-UP VISIT: CPT | Performed by: SURGERY

## 2020-07-19 PROCEDURE — 6360000002 HC RX W HCPCS: Performed by: SURGERY

## 2020-07-19 PROCEDURE — 6370000000 HC RX 637 (ALT 250 FOR IP): Performed by: FAMILY MEDICINE

## 2020-07-19 PROCEDURE — G0378 HOSPITAL OBSERVATION PER HR: HCPCS

## 2020-07-19 RX ORDER — LEVETIRACETAM 500 MG/1
1000 TABLET ORAL 2 TIMES DAILY
Status: DISCONTINUED | OUTPATIENT
Start: 2020-07-19 | End: 2020-07-19 | Stop reason: HOSPADM

## 2020-07-19 RX ORDER — PROMETHAZINE HYDROCHLORIDE 12.5 MG/1
12.5 TABLET ORAL 3 TIMES DAILY PRN
Qty: 12 TABLET | Refills: 0 | Status: ON HOLD | OUTPATIENT
Start: 2020-07-19 | End: 2020-07-23 | Stop reason: HOSPADM

## 2020-07-19 RX ORDER — HEPARIN SODIUM (PORCINE) LOCK FLUSH IV SOLN 100 UNIT/ML 100 UNIT/ML
500 SOLUTION INTRAVENOUS PRN
Status: DISCONTINUED | OUTPATIENT
Start: 2020-07-19 | End: 2020-07-19 | Stop reason: HOSPADM

## 2020-07-19 RX ORDER — HEPARIN SODIUM (PORCINE) LOCK FLUSH IV SOLN 100 UNIT/ML 100 UNIT/ML
100 SOLUTION INTRAVENOUS PRN
Status: DISCONTINUED | OUTPATIENT
Start: 2020-07-19 | End: 2020-07-19

## 2020-07-19 RX ORDER — LEVETIRACETAM 1000 MG/1
1000 TABLET ORAL 2 TIMES DAILY
Qty: 60 TABLET | Refills: 5 | Status: SHIPPED | OUTPATIENT
Start: 2020-07-19 | End: 2021-05-12 | Stop reason: SDUPTHER

## 2020-07-19 RX ADMIN — LEVOTHYROXINE SODIUM 125 MCG: 125 TABLET ORAL at 05:28

## 2020-07-19 RX ADMIN — ONDANSETRON 4 MG: 4 TABLET, ORALLY DISINTEGRATING ORAL at 03:57

## 2020-07-19 RX ADMIN — MORPHINE SULFATE 2 MG: 2 INJECTION, SOLUTION INTRAMUSCULAR; INTRAVENOUS at 03:57

## 2020-07-19 RX ADMIN — SODIUM CHLORIDE, PRESERVATIVE FREE 10 ML: 5 INJECTION INTRAVENOUS at 03:57

## 2020-07-19 RX ADMIN — TIZANIDINE 4 MG: 4 TABLET ORAL at 09:01

## 2020-07-19 RX ADMIN — LEVETIRACETAM 1000 MG: 500 TABLET ORAL at 15:27

## 2020-07-19 RX ADMIN — LORAZEPAM 1 MG: 1 TABLET ORAL at 09:01

## 2020-07-19 RX ADMIN — PROMETHAZINE HYDROCHLORIDE 12.5 MG: 25 INJECTION INTRAMUSCULAR; INTRAVENOUS at 08:49

## 2020-07-19 RX ADMIN — MORPHINE SULFATE 2 MG: 2 INJECTION, SOLUTION INTRAMUSCULAR; INTRAVENOUS at 08:49

## 2020-07-19 RX ADMIN — ATENOLOL 25 MG: 25 TABLET ORAL at 09:01

## 2020-07-19 RX ADMIN — SODIUM CHLORIDE, PRESERVATIVE FREE 10 ML: 5 INJECTION INTRAVENOUS at 08:49

## 2020-07-19 RX ADMIN — SODIUM CHLORIDE, POTASSIUM CHLORIDE, SODIUM LACTATE AND CALCIUM CHLORIDE: 600; 310; 30; 20 INJECTION, SOLUTION INTRAVENOUS at 09:56

## 2020-07-19 RX ADMIN — PROMETHAZINE HYDROCHLORIDE 12.5 MG: 25 INJECTION INTRAMUSCULAR; INTRAVENOUS at 15:16

## 2020-07-19 RX ADMIN — Medication 500 UNITS: at 15:16

## 2020-07-19 RX ADMIN — MORPHINE SULFATE 2 MG: 2 INJECTION, SOLUTION INTRAMUSCULAR; INTRAVENOUS at 13:22

## 2020-07-19 ASSESSMENT — PAIN SCALES - GENERAL
PAINLEVEL_OUTOF10: 7
PAINLEVEL_OUTOF10: 8
PAINLEVEL_OUTOF10: 2
PAINLEVEL_OUTOF10: 7

## 2020-07-19 NOTE — PLAN OF CARE
Problem: Skin Integrity:  Goal: Will show no infection signs and symptoms  Description: Will show no infection signs and symptoms  Outcome: Ongoing  Goal: Absence of new skin breakdown  Description: Absence of new skin breakdown  Outcome: Ongoing     Problem: Pain:  Goal: Pain level will decrease  Description: Pain level will decrease  Outcome: Ongoing  Goal: Control of acute pain  Description: Control of acute pain  Outcome: Ongoing  Goal: Control of chronic pain  Description: Control of chronic pain  Outcome: Ongoing     Problem: Coping:  Goal: Ability to cope will improve  Description: Ability to cope will improve  Outcome: Ongoing  Goal: Ability to identify appropriate support needs will improve  Description: Ability to identify appropriate support needs will improve  Outcome: Ongoing     Problem: Health Behavior:  Goal: Ability to manage health-related needs will improve  Description: Ability to manage health-related needs will improve  Outcome: Ongoing     Problem: Physical Regulation:  Goal: Signs of adequate cerebral perfusion will increase  Description: Signs of adequate cerebral perfusion will increase  Outcome: Ongoing  Goal: Ability to maintain a stable neurologic state will improve  Description: Ability to maintain a stable neurologic state will improve  Outcome: Ongoing     Problem: Safety:  Goal: Ability to remain free from injury will improve  Description: Ability to remain free from injury will improve  Outcome: Ongoing     Problem: Self-Concept:  Goal: Level of anxiety will decrease  Description: Level of anxiety will decrease  Outcome: Ongoing  Goal: Ability to verbalize feelings about condition will improve  Description: Ability to verbalize feelings about condition will improve  Outcome: Ongoing     Problem: Falls - Risk of:  Goal: Will remain free from falls  Description: Will remain free from falls  Outcome: Ongoing  Goal: Absence of physical injury  Description: Absence of physical Reviewed d.c instructions and scripts with patient, patient verbalizes understanding and denies any questions at this time. vss and iv removed. Patient dc ambulatory, gait steady    injury  Outcome: Ongoing

## 2020-07-19 NOTE — PROGRESS NOTES
GENERAL SURGERY PROGRESS NOTE    Madalyn Molina is a 44 y.o. female post procedure day 1 after EGD with dilation and subsequent seizure like activity. Subjective:  Doing better today. Reports some right sided pain and headache. Denies other complaints. Objective:    Vitals: VITALS:  BP (!) 180/99   Pulse 69   Temp 98.7 °F (37.1 °C) (Oral)   Resp 18   Ht 5' 4\" (1.626 m)   Wt 276 lb 14.4 oz (125.6 kg)   SpO2 95%   Breastfeeding No   BMI 47.53 kg/m²     I/O: 07/18 0701 - 07/19 0700  In: 820 [I.V.:820]  Out: 650 [Urine:650]    Labs/Imaging Results:   Lab Results   Component Value Date     07/18/2020    K 4.5 07/18/2020     07/18/2020    CO2 29 07/18/2020    BUN 10 07/18/2020    CREATININE 0.8 07/18/2020    GLUCOSE 91 07/18/2020    CALCIUM 10.3 07/18/2020      Lab Results   Component Value Date    WBC 5.3 07/18/2020    HGB 11.1 (L) 07/18/2020    HCT 35.4 (L) 07/18/2020    MCV 84.9 07/18/2020     07/18/2020       IV Fluids: lactated ringers Last Rate: 100 mL/hr at 07/19/20 3469    Scheduled Meds:   atenolol, 25 mg, Oral, Daily    levothyroxine, 125 mcg, Oral, Daily    PARoxetine, 50 mg, Oral, Nightly    tiZANidine, 4 mg, Oral, BID    sodium chloride flush, 10 mL, Intravenous, 2 times per day    Physical Exam:  General: A&O x 3, no distress. HEENT: Anicteric sclerae, MMM.E.  Abdomen: Soft, mildly tender at the right lower chest over the ribcage    Assessment and Plan:  44 y.o. female s/p EGD with dilation and subsequent seizure like activity on 7/17. No further seizures but endorses epigastric pain and headache with congestion. Head CT negative. EEG without seizure activity.         Patient Active Problem List:     Fibromyalgia     Lupus (systemic lupus erythematosus) (HCC)     Chronic pancreatitis (HCC)     HTN (hypertension)     Generalized abdominal pain     Frequent UTI     Gastroesophageal reflux disease without esophagitis     Depression     Fatty liver disease, nonalcoholic     Arthritis     Bilateral low back pain with left-sided sciatica     Intractable vomiting with nausea     Hiatal hernia     Status post laparoscopic sleeve gastrectomy     Pseudoseizure     Chronic pain syndrome     Drug-seeking/Aberrant behavior     Lymph node disorder     Morbid obesity with BMI of 40.0-44.9, adult (HCC)     Iron deficiency anemia secondary to blood loss (chronic)     Encounter for weight management     Intractable nausea and vomiting     History of Jet-en-Y gastric bypass     Seizure ([facility] [address]      Will discharge home today  Follow up with Dr. Magdalena Flower in 1-2 weeks    Yeny Reed MD  7/19/2020  10:50 AM

## 2020-07-19 NOTE — PLAN OF CARE
Problem: Skin Integrity:  Goal: Will show no infection signs and symptoms  Description: Will show no infection signs and symptoms  7/19/2020 1217 by Pedro Luis Minor LPN  Outcome: Ongoing  7/19/2020 0100 by Shan Modi RN  Outcome: Ongoing  Goal: Absence of new skin breakdown  Description: Absence of new skin breakdown  7/19/2020 1217 by Pedro Luis Minor LPN  Outcome: Ongoing  7/19/2020 0100 by Shan Modi RN  Outcome: Ongoing     Problem: Pain:  Goal: Pain level will decrease  Description: Pain level will decrease  7/19/2020 1217 by Pedro Luis Minor LPN  Outcome: Ongoing  7/19/2020 0100 by Shan Modi RN  Outcome: Ongoing  Goal: Control of acute pain  Description: Control of acute pain  7/19/2020 1217 by Pedro Luis Minor LPN  Outcome: Ongoing  7/19/2020 0100 by Shan Modi RN  Outcome: Ongoing  Goal: Control of chronic pain  Description: Control of chronic pain  7/19/2020 1217 by Pedro Luis Minor LPN  Outcome: Ongoing  7/19/2020 0100 by Shan Modi RN  Outcome: Ongoing     Problem: Coping:  Goal: Ability to cope will improve  Description: Ability to cope will improve  7/19/2020 1217 by Pedro Luis Minor LPN  Outcome: Ongoing  7/19/2020 0100 by Shan Modi RN  Outcome: Ongoing  Goal: Ability to identify appropriate support needs will improve  Description: Ability to identify appropriate support needs will improve  7/19/2020 1217 by Pedro Luis Minor LPN  Outcome: Ongoing  7/19/2020 0100 by Shan Modi RN  Outcome: Ongoing

## 2020-07-19 NOTE — PROGRESS NOTES
morbid obesity   Anxiety         Assessment/Plan:   Tele   IVF  Keppra   Neuro input noted   Home meds , reviewed and resumed as appropriate   Symptoms releif/Pain control  DVT proph       DVT Prophylaxis   Diet: DIET FULL LIQUID;  Dietary Nutrition Supplements: Low Calorie High Protein Supplement  Code Status: Full Code    Treatment progress and plan was d/w pt/family .         Santosh Goodson MD

## 2020-07-19 NOTE — PROGRESS NOTES
Attending Progress Note      PCP: Lorene Roman MD    Patient: Yared Castellano   Gender: female  : 1970   Age: 44 y.o. MRN: [id number]      Date of Admission: 2020    Chief Complaint: No chief complaint on file. Subjective: no seizure         Medications:  Reviewed  Infusion Medications    lactated ringers 100 mL/hr at 20 0956     Scheduled Medications    atenolol  25 mg Oral Daily    levothyroxine  125 mcg Oral Daily    PARoxetine  50 mg Oral Nightly    tiZANidine  4 mg Oral BID    sodium chloride flush  10 mL Intravenous 2 times per day     PRN Meds: heparin flush, LORazepam, ondansetron, sodium chloride flush, potassium chloride, magnesium sulfate, promethazine, morphine      Intake/Output Summary (Last 24 hours) at 2020 1516  Last data filed at 2020 0315  Gross per 24 hour   Intake 820 ml   Output --   Net 820 ml       Exam:  BP (!) 180/99   Pulse 69   Temp 98.7 °F (37.1 °C) (Oral)   Resp 18   Ht 5' 4\" (1.626 m)   Wt 276 lb 14.4 oz (125.6 kg)   SpO2 95%   Breastfeeding No   BMI 47.53 kg/m²   General appearance: No distress,   Respiratory:  good air entry , no Rales , No wheezing, or rhonchi,  Cardiovascular: RRR, with normal S1/S2 . Abdomen : Soft, non-tender, non-distended  , normal bowel sounds. Legs : No edema bilaterally. No DVT signs ,    Neurologic:  Alert and oriented ,        Labs:   Recent Labs     20  0510   WBC 5.3   HGB 11.1*   HCT 35.4*        Recent Labs     20  0510      K 4.5      CO2 29   BUN 10   CREATININE 0.8   CALCIUM 10.3     No results for input(s): AST, ALT, BILIDIR, BILITOT, ALKPHOS in the last 72 hours. No results for input(s): INR in the last 72 hours. No results for input(s): Hernandez Service in the last 72 hours. Assessment/Plan:  Stable . .. ok for d/c     Active Hospital Problems    Diagnosis Date Noted    Seizure [facility]) [R56.9] 2020    History of Jet-en-Y gastric bypass Protein Supplement  Code Status: Full Code    Treatment progress and plan was d/w pt/family .         Raisa Rao MD

## 2020-07-19 NOTE — PROGRESS NOTES
Neurology Service Progress Note   [facility]   Patient Name: Luzma Briones  : 1970        Subjective:   Patient seen and examained   No acute changes overnight, she has not had any repeat episodes  We discussed her history of having \"seizure\" she states that she had a \"seizure\" around 3 years ago after having the worst back pain of her life. We discuss risk v. Benefit of starting AED, she is in agreement to not start AED  She denies CP and SOB  She did not bite or tonuge  Chronic LLE weakness  No HA, lightheadedness   No facial droop, aphasia or dysarthria       Past Medical History:   Diagnosis Date    Acid reflux     Anemia     Anxiety     Arthritis     Hands, Back And Ankles    Bleeding ulcer     \"I Had Ulcers In My Stomach And Colon\"/ per pt on 2019\"they said recently having some blood in my stomach- in July ( )could not find where coming from \"    Bronchitis Last Episode     Chronic back pain     Chronic pain     [facility]    COPD (chronic obstructive pulmonary disease) ([facility] [address]     Sees Dr. Chema Lozano Depression     Fibromyalgia Dx     H/O echocardiogram 8/11/15    EF >55%. LA to be at the upper limit of normal in size. LV hypertrophy with normal LV systolic, but abnormal diastolic function. Normal valvular structures and function.  H/O echocardiogram 2018    EF 55-60%    Hiatal hernia     History of blood transfusion 2015 And     No Reaction To Blood Transfusions Received    [place] (hard of hearing)     Right Ear    Hx of cardiac catheterization 10/24/2010    Angiographically normal coronary arteries w/ normal LV function and wall motion.  Hx of transesophageal echocardiography (PILO) for monitoring 2010    EF 55-60%. WNL.    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- consult with Dr Laney Pinzon     \"very difficulty IV stick- had mediport infection  High Blood Pressure Brother     Asthma Son     Vision Loss Son     Lupus Daughter     Other Daughter         \"Alot Of Female Problems\"       Review of Symptoms:    15 groups all reviewed, all negative unless mentioned above     Physical Exam:         Gen: alert, oriented x4, obese, follows commands   HEENT: NC/AT, EOMI, PERRL, mmm,  neck supple, no meningeal signs; Heart: regular   Lungs: no distress  Ext: no edema, no calf tenderness b/l  Psych: normal mood and affect  Skin: no rashes or lesions    NEUROLOGIC EXAM:    Mental Status: Oriented x4, names and repeats, speech is clear and fluent     Cranial Nerve Exam:   CN II-XII:  Grossly intact with no gaze deviation, pupil equal and reactive, face symmetric bilaterally     Motor Exam/Pain:    To gravity x4, light touch and temperature intact x4    Deep Tendon Reflexes: 1/4 biceps, triceps, brachioradialis, patellar, and achilles b/l; flexor plantar responses b/l    Coordination/Cerebellum:       Tremors--none    Gait and stance:      Gait: deferred      LABS:     Recent Labs     07/18/20  0510   WBC 5.3      K 4.5      CO2 29   BUN 10   CREATININE 0.8   GLUCOSE 91         IMAGING:      CT head:  No acute intracranial abnormality. EEG normal awake, limited study     ASSESSMENT/PLAN:     3 44year old female with acute seizure activity post sedation, suspicion for seizure v. Hypoperfusion v. PNES superimposed on acute sinus bradycardia. Plan of care as follows:  1. Neuro Exam:  1. Non focal, encephalopathy improved back to baseline    2. Neurodiagnostics:  1. CT head non acute    2. EEG normal awake   3. Medications:  1. No AED   4. PT/OT/ST:  1. Per their recommendations   5. Follow up:  1. Stable for discharge         Thank you for allowing us to participate in the care of your patient. If there are any questions regarding evaluation please feel free to contact us.      MICHAEL Rabago - GINETTE, 7/19/2020

## 2020-07-20 ENCOUNTER — HOSPITAL ENCOUNTER (INPATIENT)
Age: 50
LOS: 1 days | Discharge: HOME | DRG: 392 | End: 2020-07-23
Attending: EMERGENCY MEDICINE | Admitting: FAMILY MEDICINE
Payer: COMMERCIAL

## 2020-07-20 ENCOUNTER — APPOINTMENT (OUTPATIENT)
Dept: CT IMAGING | Age: 50
DRG: 392 | End: 2020-07-20
Payer: COMMERCIAL

## 2020-07-20 ENCOUNTER — APPOINTMENT (OUTPATIENT)
Dept: GENERAL RADIOLOGY | Age: 50
DRG: 392 | End: 2020-07-20
Payer: COMMERCIAL

## 2020-07-20 LAB
ALBUMIN SERPL-MCNC: 4.5 GM/DL (ref 3.4–5)
ALP BLD-CCNC: 121 IU/L (ref 40–128)
ALT SERPL-CCNC: 26 U/L (ref 10–40)
ANION GAP SERPL CALCULATED.3IONS-SCNC: 13 MMOL/L (ref 4–16)
AST SERPL-CCNC: 24 IU/L (ref 15–37)
BILIRUB SERPL-MCNC: 0.2 MG/DL (ref 0–1)
BUN BLDV-MCNC: 19 MG/DL (ref 6–23)
CALCIUM SERPL-MCNC: 10.3 MG/DL (ref 8.3–10.6)
CHLORIDE BLD-SCNC: 104 MMOL/L (ref 99–110)
CO2: 24 MMOL/L (ref 21–32)
CREAT SERPL-MCNC: 0.8 MG/DL (ref 0.6–1.1)
GFR AFRICAN AMERICAN: >60 ML/MIN/1.73M2
GFR NON-AFRICAN AMERICAN: >60 ML/MIN/1.73M2
GLUCOSE BLD-MCNC: 103 MG/DL (ref 70–99)
LIPASE: 21 IU/L (ref 13–60)
POTASSIUM SERPL-SCNC: 4.1 MMOL/L (ref 3.5–5.1)
REASON FOR REJECTION: NORMAL
REJECTED TEST: NORMAL
SODIUM BLD-SCNC: 141 MMOL/L (ref 135–145)
TOTAL PROTEIN: 7.4 GM/DL (ref 6.4–8.2)
TROPONIN T: <0.01 NG/ML

## 2020-07-20 PROCEDURE — 96361 HYDRATE IV INFUSION ADD-ON: CPT

## 2020-07-20 PROCEDURE — 71045 X-RAY EXAM CHEST 1 VIEW: CPT

## 2020-07-20 PROCEDURE — 96374 THER/PROPH/DIAG INJ IV PUSH: CPT

## 2020-07-20 PROCEDURE — 84484 ASSAY OF TROPONIN QUANT: CPT

## 2020-07-20 PROCEDURE — 2580000003 HC RX 258: Performed by: EMERGENCY MEDICINE

## 2020-07-20 PROCEDURE — 96375 TX/PRO/DX INJ NEW DRUG ADDON: CPT

## 2020-07-20 PROCEDURE — 80053 COMPREHEN METABOLIC PANEL: CPT

## 2020-07-20 PROCEDURE — 99285 EMERGENCY DEPT VISIT HI MDM: CPT

## 2020-07-20 PROCEDURE — 6360000002 HC RX W HCPCS: Performed by: EMERGENCY MEDICINE

## 2020-07-20 PROCEDURE — 93005 ELECTROCARDIOGRAM TRACING: CPT | Performed by: EMERGENCY MEDICINE

## 2020-07-20 PROCEDURE — 83690 ASSAY OF LIPASE: CPT

## 2020-07-20 RX ORDER — ONDANSETRON 2 MG/ML
8 INJECTION INTRAMUSCULAR; INTRAVENOUS ONCE
Status: COMPLETED | OUTPATIENT
Start: 2020-07-20 | End: 2020-07-20

## 2020-07-20 RX ORDER — 0.9 % SODIUM CHLORIDE 0.9 %
1000 INTRAVENOUS SOLUTION INTRAVENOUS ONCE
Status: COMPLETED | OUTPATIENT
Start: 2020-07-20 | End: 2020-07-20

## 2020-07-20 RX ORDER — MORPHINE SULFATE 4 MG/ML
4 INJECTION, SOLUTION INTRAMUSCULAR; INTRAVENOUS ONCE
Status: COMPLETED | OUTPATIENT
Start: 2020-07-20 | End: 2020-07-20

## 2020-07-20 RX ADMIN — MORPHINE SULFATE 4 MG: 4 INJECTION, SOLUTION INTRAMUSCULAR; INTRAVENOUS at 22:48

## 2020-07-20 RX ADMIN — ONDANSETRON 8 MG: 2 INJECTION INTRAMUSCULAR; INTRAVENOUS at 22:48

## 2020-07-20 RX ADMIN — SODIUM CHLORIDE 1000 ML: 9 INJECTION, SOLUTION INTRAVENOUS at 22:52

## 2020-07-20 ASSESSMENT — PAIN SCALES - GENERAL
PAINLEVEL_OUTOF10: 9
PAINLEVEL_OUTOF10: 10

## 2020-07-21 ENCOUNTER — APPOINTMENT (OUTPATIENT)
Dept: CT IMAGING | Age: 50
DRG: 392 | End: 2020-07-21
Payer: COMMERCIAL

## 2020-07-21 PROBLEM — R10.9 ABDOMINAL PAIN: Status: ACTIVE | Noted: 2020-07-21

## 2020-07-21 LAB
BASOPHILS ABSOLUTE: 0 K/CU MM
BASOPHILS RELATIVE PERCENT: 0.5 % (ref 0–1)
DIFFERENTIAL TYPE: ABNORMAL
EOSINOPHILS ABSOLUTE: 0.2 K/CU MM
EOSINOPHILS RELATIVE PERCENT: 3.9 % (ref 0–3)
HCT VFR BLD CALC: 32.8 % (ref 37–47)
HEMOGLOBIN: 10.3 GM/DL (ref 12.5–16)
IMMATURE NEUTROPHIL %: 0.2 % (ref 0–0.43)
LYMPHOCYTES ABSOLUTE: 1.9 K/CU MM
LYMPHOCYTES RELATIVE PERCENT: 33.5 % (ref 24–44)
MCH RBC QN AUTO: 26.4 PG (ref 27–31)
MCHC RBC AUTO-ENTMCNC: 31.4 % (ref 32–36)
MCV RBC AUTO: 84.1 FL (ref 78–100)
MONOCYTES ABSOLUTE: 0.6 K/CU MM
MONOCYTES RELATIVE PERCENT: 9.8 % (ref 0–4)
NUCLEATED RBC %: 0 %
PDW BLD-RTO: 13.9 % (ref 11.7–14.9)
PLATELET # BLD: 184 K/CU MM (ref 140–440)
PMV BLD AUTO: 10.1 FL (ref 7.5–11.1)
RBC # BLD: 3.9 M/CU MM (ref 4.2–5.4)
SEGMENTED NEUTROPHILS ABSOLUTE COUNT: 2.9 K/CU MM
SEGMENTED NEUTROPHILS RELATIVE PERCENT: 52.1 % (ref 36–66)
TOTAL IMMATURE NEUTOROPHIL: 0.01 K/CU MM
TOTAL NUCLEATED RBC: 0 K/CU MM
WBC # BLD: 5.6 K/CU MM (ref 4–10.5)

## 2020-07-21 PROCEDURE — 96375 TX/PRO/DX INJ NEW DRUG ADDON: CPT

## 2020-07-21 PROCEDURE — 6360000004 HC RX CONTRAST MEDICATION: Performed by: EMERGENCY MEDICINE

## 2020-07-21 PROCEDURE — G0378 HOSPITAL OBSERVATION PER HR: HCPCS

## 2020-07-21 PROCEDURE — 2580000003 HC RX 258: Performed by: FAMILY MEDICINE

## 2020-07-21 PROCEDURE — 6360000002 HC RX W HCPCS: Performed by: EMERGENCY MEDICINE

## 2020-07-21 PROCEDURE — 74177 CT ABD & PELVIS W/CONTRAST: CPT

## 2020-07-21 PROCEDURE — 93010 ELECTROCARDIOGRAM REPORT: CPT | Performed by: INTERNAL MEDICINE

## 2020-07-21 PROCEDURE — 96376 TX/PRO/DX INJ SAME DRUG ADON: CPT

## 2020-07-21 PROCEDURE — 71275 CT ANGIOGRAPHY CHEST: CPT

## 2020-07-21 PROCEDURE — 6360000002 HC RX W HCPCS: Performed by: FAMILY MEDICINE

## 2020-07-21 PROCEDURE — 94761 N-INVAS EAR/PLS OXIMETRY MLT: CPT

## 2020-07-21 PROCEDURE — 85025 COMPLETE CBC W/AUTO DIFF WBC: CPT

## 2020-07-21 PROCEDURE — 96372 THER/PROPH/DIAG INJ SC/IM: CPT

## 2020-07-21 PROCEDURE — 6370000000 HC RX 637 (ALT 250 FOR IP): Performed by: FAMILY MEDICINE

## 2020-07-21 RX ORDER — ACETAMINOPHEN 325 MG/1
650 TABLET ORAL EVERY 4 HOURS PRN
Status: DISCONTINUED | OUTPATIENT
Start: 2020-07-21 | End: 2020-07-23 | Stop reason: HOSPADM

## 2020-07-21 RX ORDER — M-VIT,TX,IRON,MINS/CALC/FOLIC 27MG-0.4MG
1 TABLET ORAL DAILY
Status: DISCONTINUED | OUTPATIENT
Start: 2020-07-22 | End: 2020-07-23 | Stop reason: HOSPADM

## 2020-07-21 RX ORDER — PROMETHAZINE HYDROCHLORIDE 25 MG/ML
25 INJECTION, SOLUTION INTRAMUSCULAR; INTRAVENOUS ONCE
Status: COMPLETED | OUTPATIENT
Start: 2020-07-21 | End: 2020-07-21

## 2020-07-21 RX ORDER — DICYCLOMINE HYDROCHLORIDE 10 MG/1
10 CAPSULE ORAL 3 TIMES DAILY PRN
Status: DISCONTINUED | OUTPATIENT
Start: 2020-07-21 | End: 2020-07-23 | Stop reason: HOSPADM

## 2020-07-21 RX ORDER — GABAPENTIN 400 MG/1
800 CAPSULE ORAL 3 TIMES DAILY
Status: DISCONTINUED | OUTPATIENT
Start: 2020-07-21 | End: 2020-07-23 | Stop reason: HOSPADM

## 2020-07-21 RX ORDER — MORPHINE SULFATE 4 MG/ML
4 INJECTION, SOLUTION INTRAMUSCULAR; INTRAVENOUS ONCE
Status: COMPLETED | OUTPATIENT
Start: 2020-07-21 | End: 2020-07-21

## 2020-07-21 RX ORDER — LORAZEPAM 1 MG/1
1 TABLET ORAL PRN
Status: DISCONTINUED | OUTPATIENT
Start: 2020-07-21 | End: 2020-07-23 | Stop reason: HOSPADM

## 2020-07-21 RX ORDER — TIZANIDINE 4 MG/1
4 TABLET ORAL 2 TIMES DAILY
Status: DISCONTINUED | OUTPATIENT
Start: 2020-07-21 | End: 2020-07-23 | Stop reason: HOSPADM

## 2020-07-21 RX ORDER — SODIUM CHLORIDE 0.9 % (FLUSH) 0.9 %
10 SYRINGE (ML) INJECTION PRN
Status: DISCONTINUED | OUTPATIENT
Start: 2020-07-21 | End: 2020-07-23 | Stop reason: HOSPADM

## 2020-07-21 RX ORDER — SODIUM CHLORIDE 0.9 % (FLUSH) 0.9 %
10 SYRINGE (ML) INJECTION EVERY 12 HOURS SCHEDULED
Status: DISCONTINUED | OUTPATIENT
Start: 2020-07-21 | End: 2020-07-23 | Stop reason: HOSPADM

## 2020-07-21 RX ORDER — OXYCODONE HYDROCHLORIDE AND ACETAMINOPHEN 5; 325 MG/1; MG/1
1 TABLET ORAL EVERY 6 HOURS PRN
Status: DISCONTINUED | OUTPATIENT
Start: 2020-07-21 | End: 2020-07-23

## 2020-07-21 RX ORDER — LEVETIRACETAM 500 MG/1
1000 TABLET ORAL 2 TIMES DAILY
Status: DISCONTINUED | OUTPATIENT
Start: 2020-07-21 | End: 2020-07-23 | Stop reason: HOSPADM

## 2020-07-21 RX ORDER — PANTOPRAZOLE SODIUM 40 MG/1
40 TABLET, DELAYED RELEASE ORAL
Status: DISCONTINUED | OUTPATIENT
Start: 2020-07-22 | End: 2020-07-23 | Stop reason: HOSPADM

## 2020-07-21 RX ORDER — ESTRADIOL 1 MG/1
0.5 TABLET ORAL DAILY
Status: DISCONTINUED | OUTPATIENT
Start: 2020-07-21 | End: 2020-07-23 | Stop reason: HOSPADM

## 2020-07-21 RX ORDER — ATENOLOL 25 MG/1
25 TABLET ORAL DAILY
Status: DISCONTINUED | OUTPATIENT
Start: 2020-07-21 | End: 2020-07-23 | Stop reason: HOSPADM

## 2020-07-21 RX ORDER — HYDROMORPHONE HCL 110MG/55ML
0.5 PATIENT CONTROLLED ANALGESIA SYRINGE INTRAVENOUS
Status: DISCONTINUED | OUTPATIENT
Start: 2020-07-21 | End: 2020-07-22

## 2020-07-21 RX ORDER — PROMETHAZINE HYDROCHLORIDE 12.5 MG/1
12.5 TABLET ORAL 3 TIMES DAILY PRN
Status: DISCONTINUED | OUTPATIENT
Start: 2020-07-21 | End: 2020-07-23 | Stop reason: HOSPADM

## 2020-07-21 RX ORDER — ONDANSETRON 4 MG/1
4 TABLET, ORALLY DISINTEGRATING ORAL EVERY 8 HOURS PRN
Status: DISCONTINUED | OUTPATIENT
Start: 2020-07-21 | End: 2020-07-23 | Stop reason: HOSPADM

## 2020-07-21 RX ORDER — CALCIUM CARBONATE 200(500)MG
500 TABLET,CHEWABLE ORAL 2 TIMES DAILY
Status: DISCONTINUED | OUTPATIENT
Start: 2020-07-21 | End: 2020-07-23 | Stop reason: HOSPADM

## 2020-07-21 RX ORDER — LEVOTHYROXINE SODIUM 0.12 MG/1
125 TABLET ORAL DAILY
Status: DISCONTINUED | OUTPATIENT
Start: 2020-07-21 | End: 2020-07-23 | Stop reason: HOSPADM

## 2020-07-21 RX ORDER — HYDROMORPHONE HCL 110MG/55ML
1 PATIENT CONTROLLED ANALGESIA SYRINGE INTRAVENOUS ONCE
Status: COMPLETED | OUTPATIENT
Start: 2020-07-21 | End: 2020-07-21

## 2020-07-21 RX ADMIN — ATENOLOL 25 MG: 25 TABLET ORAL at 16:09

## 2020-07-21 RX ADMIN — HYDROMORPHONE HYDROCHLORIDE 0.5 MG: 2 INJECTION INTRAMUSCULAR; INTRAVENOUS; SUBCUTANEOUS at 08:44

## 2020-07-21 RX ADMIN — MORPHINE SULFATE 4 MG: 4 INJECTION, SOLUTION INTRAMUSCULAR; INTRAVENOUS at 00:22

## 2020-07-21 RX ADMIN — PROMETHAZINE HYDROCHLORIDE 12.5 MG: 12.5 TABLET ORAL at 16:27

## 2020-07-21 RX ADMIN — LEVOTHYROXINE SODIUM 125 MCG: 125 TABLET ORAL at 16:13

## 2020-07-21 RX ADMIN — GABAPENTIN 800 MG: 400 CAPSULE ORAL at 21:27

## 2020-07-21 RX ADMIN — HYDROMORPHONE HYDROCHLORIDE 0.5 MG: 2 INJECTION INTRAMUSCULAR; INTRAVENOUS; SUBCUTANEOUS at 04:21

## 2020-07-21 RX ADMIN — Medication 5000 UNITS: at 16:18

## 2020-07-21 RX ADMIN — HYDROMORPHONE HYDROCHLORIDE 0.5 MG: 2 INJECTION INTRAMUSCULAR; INTRAVENOUS; SUBCUTANEOUS at 12:07

## 2020-07-21 RX ADMIN — CALCIUM CARBONATE 500 MG: 500 TABLET, CHEWABLE ORAL at 21:28

## 2020-07-21 RX ADMIN — SODIUM CHLORIDE, PRESERVATIVE FREE 10 ML: 5 INJECTION INTRAVENOUS at 21:39

## 2020-07-21 RX ADMIN — HYDROMORPHONE HYDROCHLORIDE 0.5 MG: 2 INJECTION INTRAMUSCULAR; INTRAVENOUS; SUBCUTANEOUS at 22:07

## 2020-07-21 RX ADMIN — LORAZEPAM 1 MG: 1 TABLET ORAL at 16:14

## 2020-07-21 RX ADMIN — TIZANIDINE 4 MG: 4 TABLET ORAL at 21:28

## 2020-07-21 RX ADMIN — IOPAMIDOL 100 ML: 755 INJECTION, SOLUTION INTRAVENOUS at 01:08

## 2020-07-21 RX ADMIN — PROMETHAZINE HYDROCHLORIDE 25 MG: 25 INJECTION INTRAMUSCULAR; INTRAVENOUS at 01:16

## 2020-07-21 RX ADMIN — HYDROMORPHONE HYDROCHLORIDE 1 MG: 2 INJECTION INTRAMUSCULAR; INTRAVENOUS; SUBCUTANEOUS at 02:27

## 2020-07-21 RX ADMIN — PAROXETINE 50 MG: 10 TABLET, FILM COATED ORAL at 21:28

## 2020-07-21 RX ADMIN — ESTRADIOL 0.5 MG: 1 TABLET ORAL at 16:27

## 2020-07-21 RX ADMIN — SODIUM CHLORIDE, PRESERVATIVE FREE 10 ML: 5 INJECTION INTRAVENOUS at 12:08

## 2020-07-21 RX ADMIN — DICYCLOMINE HYDROCHLORIDE 10 MG: 10 CAPSULE ORAL at 16:13

## 2020-07-21 RX ADMIN — HYDROMORPHONE HYDROCHLORIDE 0.5 MG: 2 INJECTION INTRAMUSCULAR; INTRAVENOUS; SUBCUTANEOUS at 15:51

## 2020-07-21 RX ADMIN — LEVETIRACETAM 1000 MG: 500 TABLET ORAL at 21:27

## 2020-07-21 RX ADMIN — HYDROMORPHONE HYDROCHLORIDE 0.5 MG: 2 INJECTION INTRAMUSCULAR; INTRAVENOUS; SUBCUTANEOUS at 19:02

## 2020-07-21 RX ADMIN — GABAPENTIN 800 MG: 400 CAPSULE ORAL at 16:12

## 2020-07-21 ASSESSMENT — PAIN SCALES - GENERAL
PAINLEVEL_OUTOF10: 6
PAINLEVEL_OUTOF10: 8
PAINLEVEL_OUTOF10: 3
PAINLEVEL_OUTOF10: 7
PAINLEVEL_OUTOF10: 6
PAINLEVEL_OUTOF10: 5
PAINLEVEL_OUTOF10: 5
PAINLEVEL_OUTOF10: 8
PAINLEVEL_OUTOF10: 6
PAINLEVEL_OUTOF10: 8
PAINLEVEL_OUTOF10: 7
PAINLEVEL_OUTOF10: 5

## 2020-07-21 NOTE — PLAN OF CARE
Problem: Pain:  Goal: Pain level will decrease  Description: Pain level will decrease  7/21/2020 1020 by Contreras Tran RN  Outcome: Ongoing  7/21/2020 0546 by Merlene Phalen, RN  Outcome: Ongoing  Goal: Control of acute pain  Description: Control of acute pain  7/21/2020 1020 by Contreras Tran RN  Outcome: Ongoing  7/21/2020 0546 by Merlene Phalen, RN  Outcome: Ongoing  Goal: Control of chronic pain  Description: Control of chronic pain  7/21/2020 1020 by Contreras Tran RN  Outcome: Ongoing  7/21/2020 0546 by Merlene Phalen, RN  Outcome: Ongoing     Problem: Activity:  Goal: Risk for activity intolerance will decrease  Description: Risk for activity intolerance will decrease  7/21/2020 1020 by Contreras Tran RN  Outcome: Ongoing  7/21/2020 0546 by Merlene Phalen, RN  Outcome: Ongoing     Problem:  Bowel/Gastric:  Goal: Bowel function will improve  Description: Bowel function will improve  7/21/2020 1020 by Contreras Tran RN  Outcome: Ongoing  7/21/2020 0546 by Merlene Phalen, RN  Outcome: Ongoing  Goal: Diagnostic test results will improve  Description: Diagnostic test results will improve  7/21/2020 1020 by Contreras Tran RN  Outcome: Ongoing  7/21/2020 0546 by Merlene Phalen, RN  Outcome: Ongoing  Goal: Occurrences of nausea will decrease  Description: Occurrences of nausea will decrease  7/21/2020 1020 by Contreras Tran RN  Outcome: Ongoing  7/21/2020 0546 by Merlene Phalen, RN  Outcome: Ongoing  Goal: Occurrences of vomiting will decrease  Description: Occurrences of vomiting will decrease  7/21/2020 1020 by Contreras Tran RN  Outcome: Ongoing  7/21/2020 0546 by Merlene Phalen, RN  Outcome: Ongoing     Problem: Fluid Volume:  Goal: Maintenance of adequate hydration will improve  Description: Maintenance of adequate hydration will improve  7/21/2020 1020 by Contreras Tran RN  Outcome: Ongoing  7/21/2020 0546 by Merlene Phalen, RN  Outcome: Ongoing     Problem: Health Behavior:  Goal: Ability to state signs and symptoms to report to health care provider will improve  Description: Ability to state signs and symptoms to report to health care provider will improve  7/21/2020 1020 by Willie Antony RN  Outcome: Ongoing  7/21/2020 0546 by Soy Pisano RN  Outcome: Ongoing     Problem: Physical Regulation:  Goal: Complications related to the disease process, condition or treatment will be avoided or minimized  Description: Complications related to the disease process, condition or treatment will be avoided or minimized  7/21/2020 1020 by Willie Antony RN  Outcome: Ongoing  7/21/2020 0546 by Soy Pisano RN  Outcome: Ongoing  Goal: Ability to maintain clinical measurements within normal limits will improve  Description: Ability to maintain clinical measurements within normal limits will improve  7/21/2020 1020 by Willie Antony RN  Outcome: Ongoing  7/21/2020 0546 by Soy Pisano RN  Outcome: Ongoing     Problem: Sensory:  Goal: Pain level will decrease  Description: Pain level will decrease  7/21/2020 1020 by Willie Antony RN  Outcome: Ongoing  7/21/2020 0546 by Soy Pisano RN  Outcome: Ongoing  Goal: Ability to identify factors that increase the pain will improve  Description: Ability to identify factors that increase the pain will improve  7/21/2020 1020 by Willie Antony RN  Outcome: Ongoing  7/21/2020 0546 by Soy Pisano RN  Outcome: Ongoing  Goal: Ability to notify healthcare provider of pain before it becomes unmanageable or unbearable will improve  Description: Ability to notify healthcare provider of pain before it becomes unmanageable or unbearable will improve  7/21/2020 1020 by Willie Antony RN  Outcome: Ongoing  7/21/2020 0546 by Soy Pisano RN  Outcome: Ongoing

## 2020-07-21 NOTE — ED NOTES
Pt is at ct scan     Yumiko Napoles, [facility]  07/20/20 9371

## 2020-07-21 NOTE — ED NOTES
Patient has a port and request blood work to be collected from her   [facility] site     [address]  07/20/20 9095

## 2020-07-21 NOTE — ED NOTES
Impression    No evidence of pulmonary embolism or acute pulmonary abnormality.              Benito Rodriguez RN  07/21/20 7761

## 2020-07-21 NOTE — ED PROVIDER NOTES
Triage Chief Complaint:   Abdominal Pain    [place]:  Madalyn Molina is a 44 y.o. female that presents with abdominal pain. The patient was discharged from the hospital yesterday after she was admitted for seizure control and had an EGD with esophageal dilation. States that she had abrupt onset of right upper quadrant pain last night that was sharp in nature, constant and worse with breathing. States that she has some associated shortness of breath. Denies any chest pain, fever, cough. She has associated nausea and intractable nonbilious nonbloody emesis. She has not had improvement with Percocet at home. Denies any diarrhea, constipation, urinary symptoms. She has not had pain like this before in the past but is wondering if this is her chronic pancreatitis. ROS:  At least 14 systems reviewed and otherwise acutely negative except as in the [address] Past Medical History:   Diagnosis Date    Acid reflux     Anemia     Anxiety     Arthritis     Hands, Back And Ankles    Bleeding ulcer 2014    \"I Had Ulcers In My Stomach And Colon\"/ per pt on 8/12/2019\"they said recently having some blood in my stomach- in July ( 2019)could not find where coming from \"    Bronchitis Last Episode 2014    Chronic back pain     Chronic pain     Sees Dr. Serg Staton COPD (chronic obstructive pulmonary disease) ([facility] [address]     Sees Dr. Adrienne Pearl Depression     Fibromyalgia Dx 2013    H/O echocardiogram 8/11/15    EF >55%. LA to be at the upper limit of normal in size. LV hypertrophy with normal LV systolic, but abnormal diastolic function. Normal valvular structures and function.      H/O echocardiogram 08/30/2018    EF 55-60%    Hiatal hernia     History of blood transfusion 09/2015 And 2018    No Reaction To Blood Transfusions Received    [place] (hard of hearing)     Right Ear    Hx of cardiac catheterization 10/24/2010    Angiographically normal coronary arteries w/ normal LV function and wall Mother     Heart Disease Mother     High Cholesterol Mother     Vision Loss Mother     Diabetes Father     High Blood Pressure Father     Asthma Father     Cancer Father         prostate cancer    High Blood Pressure Brother     Asthma Son     Vision Loss Son     Lupus Daughter     Other Daughter         \"Alot Of Female Problems\"     Social History     Socioeconomic History    Marital status:      Spouse name: Not on file    Number of children: Not on file    Years of education: Not on file    Highest education level: Not on file   Occupational History    Not on file   Social Needs    Financial resource strain: Not on file    Food insecurity     Worry: Not on file     Inability: Not on file   [employer] needs     Medical: Not on file     Non-medical: Not on file   Tobacco Use    Smoking status: Never Smoker    Smokeless tobacco: Never Used   Substance and Sexual Activity    Alcohol use: No     Alcohol/week: 0.0 standard drinks    Drug use: No    Sexual activity: Not Currently   Lifestyle    Physical activity     Days per week: Not on file     Minutes per session: Not on file    Stress: Not on file   Relationships    Social connections     Talks on phone: Not on file     Gets together: Not on file     Attends Congregational service: Not on file     Active member of club or organization: Not on file     Attends meetings of clubs or organizations: Not on file     Relationship status: Not on file    Intimate partner violence     Fear of current or ex partner: Not on file     Emotionally abused: Not on file     Physically abused: Not on file     Forced sexual activity: Not on file   Other Topics Concern    Not on file   Social History Narrative    Not on file     Current Facility-Administered Medications   Medication Dose Route Frequency Provider Last Rate Last Dose    HYDROmorphone (DILAUDID) injection 1 mg  1 mg Intravenous Once Kaylynn Hui MD         Current Outpatient Medications   Medication Sig Dispense Refill    promethazine (PHENERGAN) 12.5 MG tablet Take 1 tablet by mouth 3 times daily as needed for Nausea 12 tablet 0    levETIRAcetam (KEPPRA) 1000 MG tablet Take 1 tablet by mouth 2 times daily 60 tablet 5    LORazepam (ATIVAN) 1 MG tablet Take 1 mg by mouth as needed for Anxiety.  pantoprazole (PROTONIX) 40 MG tablet Take 1 tablet by mouth every morning (before breakfast) 90 tablet 1    dicyclomine (BENTYL) 10 MG capsule Take 1 capsule by mouth 3 times daily as needed (abdominal pain) 21 capsule 3    oxyCODONE-acetaminophen (PERCOCET) 5-325 MG per tablet Take 1 tablet by mouth.  Every 4-6 hours PRN      ondansetron (ZOFRAN ODT) 4 MG disintegrating tablet Take 1 tablet by mouth every 8 hours as needed for Nausea or Vomiting 30 tablet 2    Cholecalciferol (VITAMIN D3) 5000 units TABS Take 1 tablet by mouth daily      tiZANidine (ZANAFLEX) 4 MG tablet Take 4 mg by mouth 2 times daily      estradiol (ESTRACE) 0.5 MG tablet Take 0.5 mg by mouth daily      atenolol (TENORMIN) 25 MG tablet Take 25 mg by mouth daily      Multiple Vitamin (MVI, BARIATRIC ADVANTAGE MULTI-FORMULA, CHEW TAB) Take 1 tablet by mouth daily 30 tablet 5    Calcium Citrate-Vitamin D (CALCIUM + VIT D, BARIATRIC ADVANTAGE, CHEWABLE TABLET) Take 1 tablet by mouth 2 times daily 120 tablet 5    levothyroxine (SYNTHROID) 125 MCG tablet Take 1 tablet by mouth Daily (Patient taking differently: Take 125 mcg by mouth nightly ) 30 tablet 0    gabapentin (NEURONTIN) 800 MG tablet Take 800 mg by mouth 3 times daily      PARoxetine (PAXIL) 30 MG tablet Take 50 mg by mouth nightly        Allergies   Allergen Reactions    Aspirin Palpitations     \"My Heart Rate Elevates\"    Shellfish-Derived Products Swelling    Toradol [Ketorolac Tromethamine] Rash    Compazine [Prochlorperazine]     Reglan [Metoclopramide] Itching       Nursing Notes Reviewed    Physical Exam:  ED Triage Vitals [07/20/20 2111] Enc Vitals Group      /74      Pulse 74      Resp 20      Temp 98.7 °F (37.1 °C)      Temp Source Oral      SpO2 100 %      Weight 263 lb (119.3 kg)      Height 5' 4\" (1.626 m)      Head Circumference       Peak Flow       Pain Score       Pain Loc       Pain Edu? Excl. in [address]? GENERAL APPEARANCE: Awake and alert. Cooperative. No acute distress. HEAD: Normocephalic. Atraumatic. EYES: EOM's grossly intact. Sclera anicteric. ENT: Mucous membranes are moist. Tolerates saliva. No trismus. NECK: Supple. Trachea midline. HEART: RRR. Radial pulses 2+. LUNGS: Respirations unlabored. CTAB  ABDOMEN: Soft. Tender to palpation right upper quadrant. Nondistended. No guarding or rebound. EXTREMITIES: No acute deformities. SKIN: Warm and dry. NEUROLOGICAL: No gross facial drooping. Moves all 4 extremities spontaneously. PSYCHIATRIC: Normal mood.     I have reviewed and interpreted all of the currently available lab results from this visit (if applicable):  Results for orders placed or performed during the hospital encounter of 07/20/20   Comprehensive Metabolic Panel   Result Value Ref Range    Sodium 141 135 - 145 MMOL/L    Potassium 4.1 3.5 - 5.1 MMOL/L    Chloride 104 99 - 110 mMol/L    CO2 24 21 - 32 MMOL/L    BUN 19 6 - 23 MG/DL    CREATININE 0.8 0.6 - 1.1 MG/DL    Glucose 103 (H) 70 - 99 MG/DL    Calcium 10.3 8.3 - 10.6 MG/DL    Alb 4.5 3.4 - 5.0 GM/DL    Total Protein 7.4 6.4 - 8.2 GM/DL    Total Bilirubin 0.2 0.0 - 1.0 MG/DL    ALT 26 10 - 40 U/L    AST 24 15 - 37 IU/L    Alkaline Phosphatase 121 40 - 128 IU/L    GFR Non-African American >60 >60 mL/min/1.73m2    GFR African American >60 >60 mL/min/1.73m2    Anion Gap 13 4 - 16   Lipase   Result Value Ref Range    Lipase 21 13 - 60 IU/L   Troponin   Result Value Ref Range    Troponin T <0.010 <0.01 NG/ML   SPECIMEN REJECTION   Result Value Ref Range    Rejected Test CBC     Reason for Rejection UNABLE TO PERFORM TESTING:    CBC Auto Differential   Result Value Ref Range    WBC 5.6 4.0 - 10.5 K/CU MM    RBC 3.90 (L) 4.2 - 5.4 M/CU MM    Hemoglobin 10.3 (L) 12.5 - 16.0 GM/DL    Hematocrit 32.8 (L) 37 - 47 %    MCV 84.1 78 - 100 FL    MCH 26.4 (L) 27 - 31 PG    MCHC 31.4 (L) 32.0 - 36.0 %    RDW 13.9 11.7 - 14.9 %    Platelets 912 345 - 548 K/CU MM    MPV 10.1 7.5 - 11.1 FL    Differential Type AUTOMATED DIFFERENTIAL     Segs Relative 52.1 36 - 66 %    Lymphocytes % 33.5 24 - 44 %    Monocytes % 9.8 (H) 0 - 4 %    Eosinophils % 3.9 (H) 0 - 3 %    Basophils % 0.5 0 - 1 %    Segs Absolute 2.9 K/CU MM    Lymphocytes Absolute 1.9 K/CU MM    Monocytes Absolute 0.6 K/CU MM    Eosinophils Absolute 0.2 K/CU MM    Basophils Absolute 0.0 K/CU MM    Nucleated RBC % 0.0 %    Total Nucleated RBC 0.0 K/CU MM    Total Immature Neutrophil 0.01 K/CU MM    Immature Neutrophil % 0.2 0 - 0.43 %   EKG 12 Lead   Result Value Ref Range    Ventricular Rate 69 BPM    Atrial Rate 69 BPM    P-R Interval 204 ms    QRS Duration 98 ms    Q-T Interval 380 ms    QTc Calculation (Bazett) 407 ms    P Axis 45 degrees    R Axis -10 degrees    T Axis 47 degrees    Diagnosis       Normal sinus rhythm  Incomplete right bundle branch block  Nonspecific T wave abnormality  Abnormal ECG  When compared with ECG of 03-JUL-2020 15:28,  Incomplete right bundle branch block is now present        Radiographs (if obtained):  [] The following radiograph was interpreted by myself in the absence of a radiologist:  [x] Radiologist's Report Reviewed:    EKG (if obtained): (All EKG's are interpreted by myself in the absence of a cardiologist)  Normal sinus rhythm with normal axis. Incomplete right bundle branch block pattern in lead V2. No specific ST or T wave changes. No pathologic Q waves. QTc is normal.  No significant change from 7/3/2020    MDM:  Plan of care is discussed thoroughly with the patient and family if present. If performed, all imaging and lab work also discussed with patient. All relevant prior results and chart reviewed if available. Patient presents as above. She does appear uncomfortable secondary to pain. Vital signs are normal.  She is not in respiratory distress. She has right upper quadrant tenderness on exam.  She also describes a pleuritic component with some shortness of breath. CTA chest/CT abdomen/pelvis ordered for further evaluation and the patient is given morphine and Zofran for symptoms. On reevaluation, the patient is still having pain and is additionally given another dose of morphine and dose of Phenergan. She continues to have vomiting here. Imaging does not show any evidence of acute abnormality that would account for the patient's symptoms. This could be acute on chronic pancreatitis. She does not have any significant metabolic abnormalities. Due to persistent symptoms, she will be admitted for further management. She is agreeable with this plan of care. Clinical Impression:  1. Acute abdominal pain    2.  Intractable vomiting with nausea, unspecified vomiting type      (Please note that portions of this note may have been completed with a voice recognition program. Efforts were made to edit the dictations but occasionally words are mis-transcribed.)    MD Madhav Merchant MD  07/21/20 4232

## 2020-07-21 NOTE — ED NOTES
Impression    Atelectasis or scarring lateral lower left lung appears unchanged from prior    studies.         Left subclavian Port-A-Cath with prominent kink at the left subclavian level.          Maci Klein RN  07/21/20 [id number]

## 2020-07-21 NOTE — ED NOTES
Have been attempting to redraw labs     [facility]  07/20/20 3905

## 2020-07-21 NOTE — CARE COORDINATION
Cm met with pt to initiate discharge planning. Pt is from [place] but moved to [address] [place] [address] when she was an adolescent and has been back in [place] living with her mother for the last 3 years. Pt's residence is 1 story. Pt and mother drive. Pt used no DME. Pt has 2 children; son in [address] [place] [address] and Dgt in [place].. Pt has insurance to assist with cost of Rxs. Pt has PCP. Cm anticipates a discharge to home with no needs.

## 2020-07-21 NOTE — ED NOTES
Attempted to call report, states that they need some time to settle another pt before getting this one. States they will call back.      Ridge Godoy RN  07/21/20 2394

## 2020-07-21 NOTE — PLAN OF CARE
Problem: Pain:  Description: Pain management should include both nonpharmacologic and pharmacologic interventions. Goal: Pain level will decrease  Description: Pain level will decrease  Outcome: Ongoing  Goal: Control of acute pain  Description: Control of acute pain  Outcome: Ongoing  Goal: Control of chronic pain  Description: Control of chronic pain  Outcome: Ongoing     Problem: Activity:  Goal: Risk for activity intolerance will decrease  Description: Risk for activity intolerance will decrease  Outcome: Ongoing     Problem:  Bowel/Gastric:  Goal: Bowel function will improve  Description: Bowel function will improve  Outcome: Ongoing  Goal: Diagnostic test results will improve  Description: Diagnostic test results will improve  Outcome: Ongoing  Goal: Occurrences of nausea will decrease  Description: Occurrences of nausea will decrease  Outcome: Ongoing  Goal: Occurrences of vomiting will decrease  Description: Occurrences of vomiting will decrease  Outcome: Ongoing     Problem: Fluid Volume:  Goal: Maintenance of adequate hydration will improve  Description: Maintenance of adequate hydration will improve  Outcome: Ongoing     Problem: Health Behavior:  Goal: Ability to state signs and symptoms to report to health care provider will improve  Description: Ability to state signs and symptoms to report to health care provider will improve  Outcome: Ongoing     Problem: Physical Regulation:  Goal: Complications related to the disease process, condition or treatment will be avoided or minimized  Description: Complications related to the disease process, condition or treatment will be avoided or minimized  Outcome: Ongoing  Goal: Ability to maintain clinical measurements within normal limits will improve  Description: Ability to maintain clinical measurements within normal limits will improve  Outcome: Ongoing     Problem: Sensory:  Goal: Pain level will decrease  Description: Pain level will decrease  Outcome:

## 2020-07-21 NOTE — H&P
HISTORY AND PHYSICAL    2020     Patient Information:    Patient: Mena Guo     Gender: female  : 1970   Age: 44 y.o. MRN: [id number]        PCP:  Raj Carnes MD (Tel: [phone] )    Chief complaint:    Chief Complaint   Patient presents with    Abdominal Pain          History of Present Illness:  Andre Pope is a 44 y.o. female with morbid obesity , Anxiety , h/o Upper GI bleed few years ago , s/p gastric sleeve on 2019 with mild gastritis , multiple  EGD  History of pancreatitis , Pt had an EGD and abdomen CT few days ago as evaluation for worsening abdominal pain and nausea and vomiting . Pt presented to ER with same ongoing symptoms with mid/epigastric and RUQ abdominal pain , severe , persistent associated with N/V . In ER lab data non specific and and pulmonary and abdomen CT non acute . History obtained from patient . REVIEW OF SYSTEMS:   Constitutional: Negative for fever,chills . ENT: Negative for rhinorrhea,  sore throat. Respiratory: Negative for cough, shortness of breath,wheezing  Cardiovascular: Negative for chest pain, palpitations   Gastrointestinal: + for Abdominal pain, nausea, vomiting, diarrhea  Genitourinary: Negative for polyuria, dysuria   Hematologic/Lymphatic: Negative for bleeding tendency, easy bruising  Musculoskeletal: Negative for myalgias and arthralgias  Neurologic: Negative for confusion,dysarthria. Skin: Negative for itching,rash  Psychiatric: Negative for depression,anxiety, agitation. Endocrine: Negative for polydipsia,polyuria,heat /cold intolerance.     Past Medical History:   has a past medical history of Acid reflux, Anemia, Anxiety, Arthritis, Bleeding ulcer, Bronchitis, Chronic back pain, Chronic pain, COPD (chronic obstructive pulmonary disease) ([facility] [address] Depression, Fibromyalgia, H/O echocardiogram, H/O echocardiogram, Hiatal hernia, History of blood transfusion, levETIRAcetam (KEPPRA) 1000 MG tablet Take 1 tablet by mouth 2 times daily 60 tablet 5    LORazepam (ATIVAN) 1 MG tablet Take 1 mg by mouth as needed for Anxiety.  pantoprazole (PROTONIX) 40 MG tablet Take 1 tablet by mouth every morning (before breakfast) 90 tablet 1    dicyclomine (BENTYL) 10 MG capsule Take 1 capsule by mouth 3 times daily as needed (abdominal pain) 21 capsule 3    oxyCODONE-acetaminophen (PERCOCET) 5-325 MG per tablet Take 1 tablet by mouth. Every 4-6 hours PRN      ondansetron (ZOFRAN ODT) 4 MG disintegrating tablet Take 1 tablet by mouth every 8 hours as needed for Nausea or Vomiting 30 tablet 2    Cholecalciferol (VITAMIN D3) 5000 units TABS Take 1 tablet by mouth daily      tiZANidine (ZANAFLEX) 4 MG tablet Take 4 mg by mouth 2 times daily      estradiol (ESTRACE) 0.5 MG tablet Take 0.5 mg by mouth daily      atenolol (TENORMIN) 25 MG tablet Take 25 mg by mouth daily      Multiple Vitamin (MVI, BARIATRIC ADVANTAGE MULTI-FORMULA, CHEW TAB) Take 1 tablet by mouth daily 30 tablet 5    Calcium Citrate-Vitamin D (CALCIUM + VIT D, BARIATRIC ADVANTAGE, CHEWABLE TABLET) Take 1 tablet by mouth 2 times daily 120 tablet 5    levothyroxine (SYNTHROID) 125 MCG tablet Take 1 tablet by mouth Daily (Patient taking differently: Take 125 mcg by mouth nightly ) 30 tablet 0    gabapentin (NEURONTIN) 800 MG tablet Take 800 mg by mouth 3 times daily      PARoxetine (PAXIL) 30 MG tablet Take 50 mg by mouth nightly          Allergies: Allergies   Allergen Reactions    Aspirin Palpitations     \"My Heart Rate Elevates\"    Shellfish-Derived Products Swelling    Toradol [Ketorolac Tromethamine] Rash    Compazine [Prochlorperazine]     Reglan [Metoclopramide] Itching        Social History:   reports that she has never smoked. She has never used smokeless tobacco. She reports that she does not drink alcohol or use drugs.      Family History:  family history includes Arthritis in her mother; Asthma in her father and son; Cancer in her father; Diabetes in her father and mother; Heart Disease in her mother; High Blood Pressure in her brother, father, and mother; High Cholesterol in her mother; Lupus in her daughter; Obesity in her mother; Other in her daughter; Vision Loss in her mother and son. ,     Physical Exam:  BP (!) 111/52   Pulse 67   Temp 98.2 °F (36.8 °C) (Oral)   Resp 16   Ht 5' 4\" (1.626 m)   Wt 272 lb 0.8 oz (123.4 kg)   SpO2 94%   BMI 46.70 kg/m²     General appearance:  no distress . Well nourished  Eyes: Sclera clear, pupils equal  Cardiovascular: Regular rhythm, normal S1, S2. No edema in lower extremities  Respiratory: Clear to auscultation bilaterally, no wheeze, good inspiratory effort  Gastrointestinal: Abdomen soft, non-tender, not distended, normal bowel sounds  Musculoskeletal: No cyanosis in digits, neck supple  Neurology: Cranial nerves grossly intact. Alert and oriented . No speech or motor deficits  Psychiatry: Appropriate affect.  Not agitated  Skin: Warm, dry, normal turgor, no rash    Labs:  CBC:   Lab Results   Component Value Date    WBC 5.6 07/21/2020    RBC 3.90 07/21/2020    HGB 10.3 07/21/2020    HCT 32.8 07/21/2020    MCV 84.1 07/21/2020    MCH 26.4 07/21/2020    MCHC 31.4 07/21/2020    RDW 13.9 07/21/2020     07/21/2020    MPV 10.1 07/21/2020     BMP:    Lab Results   Component Value Date     07/20/2020    K 4.1 07/20/2020     07/20/2020    CO2 24 07/20/2020    BUN 19 07/20/2020    CREATININE 0.8 07/20/2020    CALCIUM 10.3 07/20/2020    GFRAA >60 07/20/2020    LABGLOM >60 07/20/2020    GLUCOSE 103 07/20/2020       Chest Xray:   EKG:    I visualized CXR images and EKG strips      Patient Active Problem List   Diagnosis Code    Fibromyalgia M79.7    Lupus (systemic lupus erythematosus) (HCC) M32.9    Chronic pancreatitis (HCC) K86.1    HTN (hypertension) I10    Generalized abdominal pain R10.84    Frequent UTI N39.0    Gastroesophageal reflux disease without esophagitis K21.9    Depression F32.9    Fatty liver disease, nonalcoholic M96.5    Arthritis M19.90    Bilateral low back pain with left-sided sciatica M54.42    Intractable vomiting with nausea R11.2    Hiatal hernia K44.9    Status post laparoscopic sleeve gastrectomy Z98.84    Pseudoseizure F44.5    Chronic pain syndrome G89.4    Drug-seeking/Aberrant behavior Z76.5    Lymph node disorder 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.9    Morbid obesity with BMI of 40.0-44.9, adult (HCC) E66.01, Z68.41    Iron deficiency anemia secondary to blood loss (chronic) D50.0    Encounter for weight management Z76.89    Intractable nausea and vomiting R11.2    History of Jet-en-Y gastric bypass Z98.84    Seizure (HCC) R56.9    Abdominal pain R10.9         Active Hospital Problems    Diagnosis    Abdominal pain [R10.9]     Nausea and vomiting   Anxiety   pseudoseizure       Assessment/Plan:   Tele   IVF  Clear liquid diet ,advance diet as tolerated   Will consider surg /GI consult if needed   Home meds , reviewed and resumed as appropriate   Symptoms releif/Pain control  DVT proph           Marly Guillen MD    7/21/2020 3:48 PM

## 2020-07-21 NOTE — ED TRIAGE NOTES
Pt presents to ED c/o abd pain. States she was discharged and the pain has become worse. Pt states she had a upper GI that was done, states she had a seizure and is not used to feeling so bad. Pt rates pain 9/10. Pt is alert and oriented.

## 2020-07-21 NOTE — ED NOTES
Impression    1. No acute findings. 2. Pneumobilia with mild intrahepatic and extrahepatic bile duct dilatation,    likely related to post cholecystectomy status and prior biliary intervention.     This is not significantly changed        Peace Townsend RN  07/21/20 7121

## 2020-07-22 PROCEDURE — G0378 HOSPITAL OBSERVATION PER HR: HCPCS

## 2020-07-22 PROCEDURE — 96376 TX/PRO/DX INJ SAME DRUG ADON: CPT

## 2020-07-22 PROCEDURE — 6360000002 HC RX W HCPCS: Performed by: FAMILY MEDICINE

## 2020-07-22 PROCEDURE — 6370000000 HC RX 637 (ALT 250 FOR IP): Performed by: FAMILY MEDICINE

## 2020-07-22 PROCEDURE — 2580000003 HC RX 258: Performed by: FAMILY MEDICINE

## 2020-07-22 PROCEDURE — 99220 PR INITIAL OBSERVATION CARE/DAY 70 MINUTES: CPT | Performed by: SURGERY

## 2020-07-22 PROCEDURE — 94761 N-INVAS EAR/PLS OXIMETRY MLT: CPT

## 2020-07-22 RX ADMIN — PAROXETINE 50 MG: 10 TABLET, FILM COATED ORAL at 21:45

## 2020-07-22 RX ADMIN — MULTIPLE VITAMINS W/ MINERALS TAB 1 TABLET: TAB at 08:56

## 2020-07-22 RX ADMIN — HYDROMORPHONE HYDROCHLORIDE 0.5 MG: 2 INJECTION INTRAMUSCULAR; INTRAVENOUS; SUBCUTANEOUS at 04:47

## 2020-07-22 RX ADMIN — ONDANSETRON 4 MG: 4 TABLET, ORALLY DISINTEGRATING ORAL at 22:00

## 2020-07-22 RX ADMIN — HYDROMORPHONE HYDROCHLORIDE 1 MG: 1 INJECTION, SOLUTION INTRAMUSCULAR; INTRAVENOUS; SUBCUTANEOUS at 18:14

## 2020-07-22 RX ADMIN — GABAPENTIN 800 MG: 400 CAPSULE ORAL at 08:56

## 2020-07-22 RX ADMIN — DICYCLOMINE HYDROCHLORIDE 10 MG: 10 CAPSULE ORAL at 22:00

## 2020-07-22 RX ADMIN — HYDROMORPHONE HYDROCHLORIDE 1 MG: 1 INJECTION, SOLUTION INTRAMUSCULAR; INTRAVENOUS; SUBCUTANEOUS at 22:00

## 2020-07-22 RX ADMIN — LEVETIRACETAM 1000 MG: 500 TABLET ORAL at 21:44

## 2020-07-22 RX ADMIN — HYDROMORPHONE HYDROCHLORIDE 1 MG: 1 INJECTION, SOLUTION INTRAMUSCULAR; INTRAVENOUS; SUBCUTANEOUS at 15:05

## 2020-07-22 RX ADMIN — LEVOTHYROXINE SODIUM 125 MCG: 125 TABLET ORAL at 07:08

## 2020-07-22 RX ADMIN — OXYCODONE HYDROCHLORIDE AND ACETAMINOPHEN 1 TABLET: 5; 325 TABLET ORAL at 13:51

## 2020-07-22 RX ADMIN — SODIUM CHLORIDE, PRESERVATIVE FREE 10 ML: 5 INJECTION INTRAVENOUS at 08:56

## 2020-07-22 RX ADMIN — LORAZEPAM 1 MG: 1 TABLET ORAL at 12:34

## 2020-07-22 RX ADMIN — GABAPENTIN 800 MG: 400 CAPSULE ORAL at 21:45

## 2020-07-22 RX ADMIN — Medication 5000 UNITS: at 12:05

## 2020-07-22 RX ADMIN — PANTOPRAZOLE SODIUM 40 MG: 40 TABLET, DELAYED RELEASE ORAL at 07:08

## 2020-07-22 RX ADMIN — PROMETHAZINE HYDROCHLORIDE 12.5 MG: 12.5 TABLET ORAL at 05:12

## 2020-07-22 RX ADMIN — PROMETHAZINE HYDROCHLORIDE 12.5 MG: 12.5 TABLET ORAL at 08:55

## 2020-07-22 RX ADMIN — LEVETIRACETAM 1000 MG: 500 TABLET ORAL at 08:56

## 2020-07-22 RX ADMIN — HYDROMORPHONE HYDROCHLORIDE 0.5 MG: 2 INJECTION INTRAMUSCULAR; INTRAVENOUS; SUBCUTANEOUS at 01:14

## 2020-07-22 RX ADMIN — TIZANIDINE 4 MG: 4 TABLET ORAL at 08:56

## 2020-07-22 RX ADMIN — CALCIUM CARBONATE 500 MG: 500 TABLET, CHEWABLE ORAL at 21:45

## 2020-07-22 RX ADMIN — ESTRADIOL 0.5 MG: 1 TABLET ORAL at 08:55

## 2020-07-22 RX ADMIN — HYDROMORPHONE HYDROCHLORIDE 0.5 MG: 2 INJECTION INTRAMUSCULAR; INTRAVENOUS; SUBCUTANEOUS at 08:56

## 2020-07-22 RX ADMIN — HYDROMORPHONE HYDROCHLORIDE 0.5 MG: 2 INJECTION INTRAMUSCULAR; INTRAVENOUS; SUBCUTANEOUS at 12:05

## 2020-07-22 RX ADMIN — GABAPENTIN 800 MG: 400 CAPSULE ORAL at 13:51

## 2020-07-22 RX ADMIN — SODIUM CHLORIDE, PRESERVATIVE FREE 10 ML: 5 INJECTION INTRAVENOUS at 21:46

## 2020-07-22 RX ADMIN — TIZANIDINE 4 MG: 4 TABLET ORAL at 21:45

## 2020-07-22 RX ADMIN — CALCIUM CARBONATE 500 MG: 500 TABLET, CHEWABLE ORAL at 08:56

## 2020-07-22 ASSESSMENT — PAIN SCALES - GENERAL
PAINLEVEL_OUTOF10: 6
PAINLEVEL_OUTOF10: 8
PAINLEVEL_OUTOF10: 8
PAINLEVEL_OUTOF10: 6
PAINLEVEL_OUTOF10: 9
PAINLEVEL_OUTOF10: 7
PAINLEVEL_OUTOF10: 8
PAINLEVEL_OUTOF10: 4
PAINLEVEL_OUTOF10: 8
PAINLEVEL_OUTOF10: 6
PAINLEVEL_OUTOF10: 7

## 2020-07-22 ASSESSMENT — ENCOUNTER SYMPTOMS
SORE THROAT: 0
STRIDOR: 0
CONSTIPATION: 0
APNEA: 0
EYE ITCHING: 0
ANAL BLEEDING: 0
RECTAL PAIN: 0
PHOTOPHOBIA: 0
NAUSEA: 1
CHOKING: 0
ABDOMINAL PAIN: 1
BACK PAIN: 0
COLOR CHANGE: 0
EYE REDNESS: 0

## 2020-07-22 ASSESSMENT — PAIN DESCRIPTION - ORIENTATION
ORIENTATION: RIGHT;UPPER
ORIENTATION: RIGHT

## 2020-07-22 ASSESSMENT — PAIN DESCRIPTION - LOCATION
LOCATION: ABDOMEN
LOCATION: ABDOMEN

## 2020-07-22 ASSESSMENT — PAIN DESCRIPTION - DESCRIPTORS
DESCRIPTORS: SHARP
DESCRIPTORS: SHARP

## 2020-07-22 ASSESSMENT — PAIN DESCRIPTION - PAIN TYPE
TYPE: ACUTE PAIN

## 2020-07-22 ASSESSMENT — PAIN DESCRIPTION - ONSET
ONSET: ON-GOING
ONSET: ON-GOING

## 2020-07-22 ASSESSMENT — PAIN DESCRIPTION - FREQUENCY
FREQUENCY: CONTINUOUS
FREQUENCY: CONTINUOUS

## 2020-07-22 NOTE — CONSULTS
[address], [facility] [facility]                                  CONSULTATION    PATIENT NAME: Tiny Castrejon                  :        1970  MED REC NO:   [id number]                          ROOM:       2118  ACCOUNT NO:   [de-identified]                           ADMIT DATE: 2020  PROVIDER:     Bogdan Emanuel MD    CONSULT DATE:  2020    CHIEF COMPLAINT:  Intractable right upper quadrant abdominal pain. HISTORY OF PRESENT ILLNESS:  The patient is a 59-year-old white female,  patient known to me from her multiple previous hospitalizations, with  past medical history significant for morbid obesity, status post sleeve  gastrectomy, lupus, hypertension, depression/anxiety, gastroesophageal  reflux disease, peptic ulcer disease, hypothyroidism, history of  gallstones, status post cholecystectomy complicated by bile duct injury  for which the patient had 7 or 8 ERCPs in [facility] and also  extensive workup done for sphincter of Oddi dysfunction with  biliary/pancreatic stents placed, which were subsequently removed, and  history of acute/recurrent/chronic pancreatitis anemia, chronic back  pain, fibromyalgia, recurrent episodes of upper GI bleeding with  multiple EGDs done, who was admitted to the hospital on the 2020  with severe intractable right upper quadrant abdominal pain along with  nausea and vomiting. There is no history of hematemesis, melena or  hematochezia. The patient's hemoglobin upon admission was 10.3 gm  percent. The patient is on Protonix and she is hemodynamically stable. Surgical consult is in order as well. The patient has had an extensive  GI workup done in the past including multiple EGDs, at least 3 EGDs done  by me and the last EGD was on the 2019 and postsurgical changes  were noted from previous sleeve gastrectomy and gastritis and old blood  was noted.   There was no evidence of active peptic ulcer disease. The  patient also is regularly being followed up by her surgical consultant,  Dr. Santos Pacheco, who has done at least 4 EGDs as well and the first EGD was  on the 08/27/2018 and sleeve gastrectomy was noted along with mild  gastritis. The second EGD on 03/12/2019 was done for upper GI bleeding,  which was unremarkable. Third EGD on 04/02/2019 showed bleeding from  the distal staple line, which was injected with epinephrine solution and  fourth EGD by Dr. Santos Pacheco on the 06/19/2019, again was unremarkable with  no evidence of upper GI bleeding lesion. The patient also has had  colonoscopy done in the past as well. The patient is hemodynamically  stable and is being followed up by Dr. Iker Edwards as an outpatient for pain  management. REVIEW OF SYSTEMS:  CENTRAL NERVOUS SYSTEM:  The patient denies headaches or focal  sensorimotor symptoms. CARDIOVASCULAR SYSTEM:  No history of chest pain, shortness of breath,  or leg swelling. GENITOURINARY SYSTEM:  No history of dysuria, pyuria, or hematuria. MUSCULOSKELETAL SYSTEM:  The patient complains of generalized weakness. RESPIRATORY SYSTEM:  No history of cough, hemoptysis, fever, or chills. PAST MEDICAL HISTORY:  Significant for history of morbid obesity, status  post sleeve gastrectomy, lupus, hypertension, depression/anxiety,  gastroesophageal reflux disease, peptic ulcer disease, hypothyroidism,  history of gallstones, status post cholecystectomy complicated by bile  duct injury with at least 7 or 8 ERCPs done in [place], [place] and  also had an extensive workup done for sphincter of Oddi dysfunction with  biliary and pancreatic stents placed, which were subsequently removed. The patient also has a history of acute/recurrent/chronic pancreatitis,  anemia, chronic back pain, fibromyalgia, recurrent episodes of upper GI  bleeding requiring multiple EGDs as above mentioned.     FAMILY HISTORY:  The patient's father was diagnosed with carcinoma of  the prostate. Maternal aunt with carcinoma of the breast.  Maternal  grandmother with carcinoma of the \"stomach\" and maternal grandfather  with carcinoma of the lung. MEDICATIONS:  Please refer to chart. SOCIOECONOMIC HISTORY:  No history of EtOH abuse. The patient does not  smoke cigarettes. PAST SURGICAL HISTORY:  The patient has had a total abdominal  hysterectomy, cholecystectomy, MediPort placed, which got infected and  was removed by Dr. Santos Pacheco, appendectomy, tonsillectomy, adenoidectomy,  partial removal of the left lung for benign disease, foot surgery,  dental surgery, . The patient has also had multiple EGDs done  and at least one colonoscopy done by Dr. Brodie Pyle around 8 years ago. The  patient also has had 7 or 8 ERCPs done in [place] [id number]  for  common bile duct stone/sphincter of Oddi dysfunction with placement of  biliary and pancreatic stents, which were subsequently removed. The  patient has had two EGDs done by Dr. Chapis Jordan and 3 EGDs by me and at  least 4 EGDs by Dr. Santos Pacheco.  The patient also had surgery done on her  left foot for osteomyelitis, approximately 9 months ago. ALLERGIES:  The patient is allergic to ASPIRIN, COMPAZINE, REGLAN,  SHELLFISH, DAIRY PRODUCTS and TORADOL. PHYSICAL EXAMINATION:  GENERAL:  Shows a 40-year-old white female, who is morbidly obese, lying  flat in bed, in no acute distress, complaining of severe intractable  right upper quadrant abdominal pain. She is awake, alert, and oriented  and pleasant to talk with. VITAL SIGNS:  Stable. HEENT:  Shows skull to be atraumatic. NECK:  Supple. CHEST:  Clear. HEART:  S1 and S2 are normal.  ABDOMEN:  Soft and nondistended with mild-to-moderate tenderness in the  right upper quadrant with some guarding. Liver and spleen are not  palpable. Bowel sounds are present. RECTAL:  Deferred. CNS:  Shows the patient to be awake, alert, and oriented.   There are no  focal sensorimotor signs. MUSCULOSKELETAL SYSTEM:  Shows evidence of degenerative joint disease  changes. LABORATORY DATA:  The labs drawn during the present hospitalization  comprised of chem profile today, which is unremarkable. LFTs are within  normal limits and the CBC shows a WBC count of 5.6, hemoglobin 10.3,  platelet count of 599,952. CAT scan of the abdomen and pelvis was done  on 07/21/2020 and was negative for acute finding; however, pneumobilia  with mild intra and extrahepatic biliary ductal dilatation was noted  from prior cholecystectomy. IMPRESSION:  A 38-year-old white female with multiple comorbidities,  presents with severe intractable right upper quadrant abdominal pain,  etiology to be determined (extensive GI workup done in the past). RECOMMENDATIONS:  1. Agree with present management with parenteral analgesics and  antiemetics. 2.  No need for repeat EGD at this point. 3.  Surgical consult in order. 4.  The patient will need to follow up with Dr. Derik Fitzpatrick for pain  management. 5.  The case and plan have been discussed in detail with the patient.         Calderon Blancas MD    D: 07/22/2020 15:45:42       T: 07/22/2020 16:37:50     [email or web address]  Job#: [id number]     Doc#: [id number]    CC:  Mario Damon MD

## 2020-07-22 NOTE — PROGRESS NOTES
Pt refusing bed alarm. Bed alarm was turned on after pt was becoming drowsy d/t pain medication that she constantly asks for. Pt was told the bed alarm will be turned on for safety purposes d/t the amount of pain medicine she is receiving. She is refusing bed alarm and becoming angry with staff members. Pt educated and bed alarm off at this time.   Lucy Dunbar RN   6:31 PM  7/22/2020

## 2020-07-22 NOTE — CONSULTS
Department of GeneralSurgery   Surgical Service Dr Fracisco Abrams   Consult Note    Date of Consult: 7/22/20      Reason for Consult: Right upper quad abdominal pain  Requesting Physician: Dr. Margarita Dakin: Abdominal pain    History Obtained From:  patient    HISTORY OF PRESENT ILLNESS:                The patient is a 44 y.o. female who presents with chronic abdominal pain. Patient is known to Dr. Gabby Flores and over the years has had multiple EGDs and abdominal surgeries. Patient has had history of pancreatitis and is status post cholecystectomy. Patient is also status post sleeve gastrectomy(2/19)  and an EGD was done 2 days ago which was reportedly normal. Pain is described as colicky and cramping. Pain level is 3/10. He is able to tolerate p.o. for now. Her CT scan was done which showed normal findings. There is no fluid collection or inflammation of the pancreas. Patient denies fever chills. She has chronic nausea. I reviewed her CT scan. Past Medical History:    Past Medical History:   Diagnosis Date    Acid reflux     Anemia     Anxiety     Arthritis     Hands, Back And Ankles    Bleeding ulcer 2014    \"I Had Ulcers In My Stomach And Colon\"/ per pt on 8/12/2019\"they said recently having some blood in my stomach- in July ( 2019)could not find where coming from \"    Bronchitis Last Episode 2014    Chronic back pain     Chronic pain     Sees Dr. Fazal Arceo COPD (chronic obstructive pulmonary disease) ([facility] [address]     Sees Dr. Sims Pac Depression     Fibromyalgia Dx 2013    H/O echocardiogram 8/11/15    EF >55%. LA to be at the upper limit of normal in size. LV hypertrophy with normal LV systolic, but abnormal diastolic function. Normal valvular structures and function.      H/O echocardiogram 08/30/2018    EF 55-60%    Hiatal hernia     History of blood transfusion 09/2015 And 2018    No Reaction To Blood Transfusions Received    [place] (hard of hearing)     Right Ear 1ST METATARSAL POWER LAVAGE AND BONE CEMENT performed by Otto Faith DPM at [address] Left Last Done In 2016     Dr. Abram Spurling, \" About 6 Surgeries Done Because Of Staph Infection\"    HIATAL HERNIA REPAIR N/A 2/12/2019    HERNIA HIATAL LAPAROSCOPIC ROBOTIC performed by Akash Farr MD at [address]  10/1997    Partial Abdominal Hysterectomy    INSERTION / REMOVAL / REPLACEMENT VENOUS ACCESS CATHETER N/A 8/15/2019    PORT INSERTION performed by Akash Farr MD at [address] [place]    removed after 6 months    LUNG REMOVAL, PARTIAL Left 2008    Benign    OTHER SURGICAL HISTORY  02/1998    \"Tubes And Ovaries Removed, Appendectomy Also Done\"    OTHER SURGICAL HISTORY  Last Done 7-15-16    Mediport Insertion \"Total Of Six Done, Removed Last Mediport In 2014\"    PORT SURGERY N/A 1/15/2020    PORT REMOVAL performed by Akash Farr MD at [facility] [address] N/A 7/6/2020    PORT INSERTION performed by Akash Farr MD at [facility] N/A 2/12/2019    GASTRECTOMY SLEEVE LAPAROSCOPIC ROBOTIC performed by Akash Farr MD at [address] [place] [address]  08/27/2018    UPPER GASTROINTESTINAL ENDOSCOPY N/A 4/2/2019    EGD CONTROL HEMORRHAGE with epi injection at bleeding site performed by Akash Farr MD at [address] [place] [address] 6/19/2019    EGD DIAGNOSTIC ONLY performed by Akash Farr MD at [facility] N/A 7/22/2019    EGD DIAGNOSTIC ONLY performed by Cipriano Saeed MD at [facility] N/A 10/14/2019    EGD DIAGNOSTIC ONLY performed by Akash Farr MD at [facility] N/A 7/17/2020    EGJ DILATATION BALLOON WITH 18-20 MM BALLOON, DILATED TO 20 MM. performed by Akash Farr MD at [facility] ENDOSCOPY       Current Medications:   Current Facility-Administered Medications   Medication Dose Route Frequency Provider Last Rate Last Dose    HYDROmorphone (DILAUDID) injection 1 mg  1 mg Intravenous Q3H PRN aDrline Cotto MD   1 mg at 07/22/20 1814    sodium chloride flush 0.9 % injection 10 mL  10 mL Intravenous 2 times per day Marek Rodriguez MD   10 mL at 07/22/20 0856    sodium chloride flush 0.9 % injection 10 mL  10 mL Intravenous PRN Marek Rodriguez MD        acetaminophen (TYLENOL) tablet 650 mg  650 mg Oral Q4H PRN Darline Cotto MD        PARoxetine (PAXIL) tablet 50 mg  50 mg Oral Nightly Marek Rdoriguez MD   50 mg at 07/21/20 2128    gabapentin (NEURONTIN) capsule 800 mg  800 mg Oral TID Marek Rodriguez MD   800 mg at 07/22/20 1351    levothyroxine (SYNTHROID) tablet 125 mcg  125 mcg Oral Daily Darline Cotto MD   125 mcg at 07/22/20 0708    estradiol (ESTRACE) tablet 0.5 mg  0.5 mg Oral Daily Marek Rodriguez MD   0.5 mg at 07/22/20 0855    atenolol (TENORMIN) tablet 25 mg  25 mg Oral Daily Marek Rodriguez MD   Stopped at 07/22/20 0942    tiZANidine (ZANAFLEX) tablet 4 mg  4 mg Oral BID Darline Cotto MD   4 mg at 07/22/20 0856    vitamin D CAPS 5,000 Units  5,000 Units Oral Daily Marek Rodriguez MD   5,000 Units at 07/22/20 1205    ondansetron (ZOFRAN-ODT) disintegrating tablet 4 mg  4 mg Oral Q8H PRN Darline Cotto MD        oxyCODONE-acetaminophen (PERCOCET) 5-325 MG per tablet 1 tablet  1 tablet Oral Q6H PRN Marek Rodriguez MD   1 tablet at 07/22/20 1351    dicyclomine (BENTYL) capsule 10 mg  10 mg Oral TID PRN Darline Cotto MD   10 mg at 07/21/20 1613    pantoprazole (PROTONIX) tablet 40 mg  40 mg Oral QAM AC Darline Cotto MD   40 mg at 07/22/20 0708    LORazepam (ATIVAN) tablet 1 mg  1 mg Oral PRN Darline Cotto MD   1 mg at 07/22/20 1234    promethazine (PHENERGAN) tablet 12.5 mg  12.5 mg Oral TID PRN Marek Rodriguez MD   12.5 mg at 07/22/20 0867    levETIRAcetam (KEPPRA) tablet 1,000 mg  1,000 mg Oral BID Mtanious MD Kirti   1,000 mg at 07/22/20 0856    therapeutic multivitamin-minerals 1 tablet  1 tablet Oral Daily Santosh Goodson MD   1 tablet at 07/22/20 0856    calcium carbonate (TUMS) chewable tablet 500 mg  500 mg Oral BID Santosh Goodson MD   500 mg at 07/22/20 0856       Allergies:  Aspirin; Shellfish-derived products; Toradol [ketorolac tromethamine];  Compazine [prochlorperazine]; and Reglan [metoclopramide]    Social History:   Social History     Socioeconomic History    Marital status:      Spouse name: None    Number of children: None    Years of education: None    Highest education level: None   Occupational History    None   Social Needs    Financial resource strain: None    Food insecurity     Worry: None     Inability: None    Transportation needs     Medical: None     Non-medical: None   Tobacco Use    Smoking status: Never Smoker    Smokeless tobacco: Never Used   Substance and Sexual Activity    Alcohol use: No     Alcohol/week: 0.0 standard drinks    Drug use: No    Sexual activity: Not Currently   Lifestyle    Physical activity     Days per week: None     Minutes per session: None    Stress: None   Relationships    Social connections     Talks on phone: None     Gets together: None     Attends Congregational service: None     Active member of club or organization: None     Attends meetings of clubs or organizations: None     Relationship status: None    Intimate partner violence     Fear of current or ex partner: None     Emotionally abused: None     Physically abused: None     Forced sexual activity: None   Other Topics Concern    None   Social History Narrative    None       Family History:   Family History   Problem Relation Age of Onset    Diabetes Mother         \"Borderline Diabetes\"    High Blood Pressure Mother     Obesity Mother     Arthritis Mother     Heart Disease Mother     High Cholesterol Mother     Vision Loss Mother     Diabetes Father     High Blood Pressure Father     Asthma Father     Cancer Father         prostate cancer    High Blood Pressure Brother     Asthma Son     Vision Loss Son     Lupus Daughter     Other Daughter         \"Alot Of Female Problems\"       REVIEW OFSYSTEMS:    Review of Systems   Constitutional: Negative for chills and fever. HENT: Negative for ear pain, mouth sores, sore throat and tinnitus. Eyes: Negative for photophobia, redness and itching. Respiratory: Negative for apnea, choking and stridor. Cardiovascular: Negative for chest pain and palpitations. Gastrointestinal: Positive for abdominal pain and nausea. Negative for anal bleeding, constipation and rectal pain. Endocrine: Negative for polydipsia. Genitourinary: Negative for enuresis, flank pain and hematuria. Musculoskeletal: Negative for back pain, joint swelling and myalgias. Skin: Negative for color change and pallor. Allergic/Immunologic: Negative for environmental allergies. Neurological: Negative for syncope and speech difficulty. Psychiatric/Behavioral: Negative for confusion and hallucinations. PHYSICAL EXAM:  Vitals:    07/22/20 0844 07/22/20 0859 07/22/20 1005 07/22/20 1810   BP: (!) 157/89   138/74   Pulse: 55   74   Resp: 16   16   Temp: 97.6 °F (36.4 °C)   98.3 °F (36.8 °C)   TempSrc: Oral   Oral   SpO2: 97% 100%  100%   Weight:       Height: 5' 4\" (1.626 m)  5' 4\" (1.626 m)        Physical Exam  Constitutional:       Appearance: She is well-developed. HENT:      Head: Normocephalic. Eyes:      Pupils: Pupils are equal, round, and reactive to light. Neck:      Musculoskeletal: Normal range of motion and neck supple. Cardiovascular:      Rate and Rhythm: Normal rate. Pulmonary:      Effort: Pulmonary effort is normal.   Abdominal:      General: There is no distension. Palpations: Abdomen is soft. There is no mass. Tenderness:  There is abdominal tenderness. There is no guarding or rebound. Musculoskeletal: Normal range of motion. Skin:     General: Skin is warm. Neurological:      Mental Status: She is alert and oriented to person, place, and time.            DATA:    CBC with Differential:    Lab Results   Component Value Date    WBC 5.6 07/21/2020    RBC 3.90 07/21/2020    HGB 10.3 07/21/2020    HCT 32.8 07/21/2020     07/21/2020    MCV 84.1 07/21/2020    MCH 26.4 07/21/2020    MCHC 31.4 07/21/2020    RDW 13.9 07/21/2020    SEGSPCT 52.1 07/21/2020    BANDSPCT 4 08/05/2018    LYMPHOPCT 33.5 07/21/2020    PROMYELOPCT 2 11/17/2015    MONOPCT 9.8 07/21/2020    MYELOPCT 1 11/22/2015    EOSPCT 0.3 11/18/2011    BASOPCT 0.5 07/21/2020    MONOSABS 0.6 07/21/2020    LYMPHSABS 1.9 07/21/2020    EOSABS 0.2 07/21/2020    BASOSABS 0.0 07/21/2020    DIFFTYPE AUTOMATED DIFFERENTIAL 07/21/2020     CMP:    Lab Results   Component Value Date     07/20/2020    K 4.1 07/20/2020     07/20/2020    CO2 24 07/20/2020    BUN 19 07/20/2020    CREATININE 0.8 07/20/2020    GFRAA >60 07/20/2020    LABGLOM >60 07/20/2020    GLUCOSE 103 07/20/2020    PROT 7.4 07/20/2020    PROT 6.5 11/18/2011    LABALBU 4.5 07/20/2020    LABALBU 8 11/18/2015    CALCIUM 10.3 07/20/2020    BILITOT 0.2 07/20/2020    ALKPHOS 121 07/20/2020    AST 24 07/20/2020    ALT 26 07/20/2020       IMPRESSION:        Patient Active Problem List:     Fibromyalgia     Lupus (systemic lupus erythematosus) (HCC)     Chronic pancreatitis (HCC)     HTN (hypertension)     Generalized abdominal pain     Frequent UTI     Gastroesophageal reflux disease without esophagitis     Depression     Fatty liver disease, nonalcoholic     Arthritis     Bilateral low back pain with left-sided sciatica     Intractable vomiting with nausea     Hiatal hernia     Status post laparoscopic sleeve gastrectomy     Pseudoseizure     Chronic pain syndrome     Drug-seeking/Aberrant behavior     Lymph node disorder Morbid obesity with BMI of 40.0-44.9, adult (HCC)     Iron deficiency anemia secondary to blood loss (chronic)     Encounter for weight management     Intractable nausea and vomiting     History of Jet-en-Y gastric bypass     Seizure ([address]     Abdominal pain      PLAN:    No surgical intervention needed at this time. Patient is relatively stable and I do not believe adhesions were caused the sort of pain she is describing. Most of her surgery was done laparoscopically and that renders minimal adhesions. Patient is on chronic pain management. Advance diet as tolerated.       Kimmie Wells MD

## 2020-07-22 NOTE — PROGRESS NOTES
Biochemical Data, Nausea or Vomiting, Skin, Weight     Discharge Planning:    No discharge needs at this time     Electronically signed by Blank Rabago RD, LD on 7/22/20 at 10:13 AM EDT    Contact: [phone]

## 2020-07-22 NOTE — PLAN OF CARE
identify factors that increase the pain will improve  Outcome: Ongoing  Goal: Ability to notify healthcare provider of pain before it becomes unmanageable or unbearable will improve  Description: Ability to notify healthcare provider of pain before it becomes unmanageable or unbearable will improve  Outcome: Ongoing     Problem: Falls - Risk of:  Goal: Will remain free from falls  Description: Will remain free from falls  Outcome: Ongoing  Goal: Absence of physical injury  Description: Absence of physical injury  Outcome: Ongoing

## 2020-07-23 VITALS
SYSTOLIC BLOOD PRESSURE: 143 MMHG | WEIGHT: 272.71 LBS | RESPIRATION RATE: 22 BRPM | HEART RATE: 70 BPM | TEMPERATURE: 98.1 F | OXYGEN SATURATION: 100 % | HEIGHT: 64 IN | BODY MASS INDEX: 46.56 KG/M2 | DIASTOLIC BLOOD PRESSURE: 87 MMHG

## 2020-07-23 PROCEDURE — 2580000003 HC RX 258: Performed by: FAMILY MEDICINE

## 2020-07-23 PROCEDURE — 96376 TX/PRO/DX INJ SAME DRUG ADON: CPT

## 2020-07-23 PROCEDURE — 6370000000 HC RX 637 (ALT 250 FOR IP): Performed by: FAMILY MEDICINE

## 2020-07-23 PROCEDURE — 6360000002 HC RX W HCPCS: Performed by: FAMILY MEDICINE

## 2020-07-23 PROCEDURE — G0378 HOSPITAL OBSERVATION PER HR: HCPCS

## 2020-07-23 PROCEDURE — 1200000000 HC SEMI PRIVATE

## 2020-07-23 PROCEDURE — 85610 PROTHROMBIN TIME: CPT

## 2020-07-23 RX ORDER — OXYCODONE AND ACETAMINOPHEN 7.5; 325 MG/1; MG/1
1 TABLET ORAL EVERY 6 HOURS PRN
Status: DISCONTINUED | OUTPATIENT
Start: 2020-07-23 | End: 2020-07-23 | Stop reason: HOSPADM

## 2020-07-23 RX ORDER — ONDANSETRON 4 MG/1
4 TABLET, ORALLY DISINTEGRATING ORAL EVERY 8 HOURS PRN
Qty: 30 TABLET | Refills: 5 | Status: ON HOLD | OUTPATIENT
Start: 2020-07-23 | End: 2020-08-09 | Stop reason: SDUPTHER

## 2020-07-23 RX ORDER — HEPARIN SODIUM (PORCINE) LOCK FLUSH IV SOLN 100 UNIT/ML 100 UNIT/ML
300-500 SOLUTION INTRAVENOUS ONCE
Status: COMPLETED | OUTPATIENT
Start: 2020-07-23 | End: 2020-07-23

## 2020-07-23 RX ORDER — MORPHINE SULFATE 2 MG/ML
1 INJECTION, SOLUTION INTRAMUSCULAR; INTRAVENOUS
Status: COMPLETED | OUTPATIENT
Start: 2020-07-23 | End: 2020-07-23

## 2020-07-23 RX ADMIN — HYDROMORPHONE HYDROCHLORIDE 1 MG: 1 INJECTION, SOLUTION INTRAMUSCULAR; INTRAVENOUS; SUBCUTANEOUS at 15:27

## 2020-07-23 RX ADMIN — Medication 500 UNITS: at 20:35

## 2020-07-23 RX ADMIN — SODIUM CHLORIDE, PRESERVATIVE FREE 10 ML: 5 INJECTION INTRAVENOUS at 15:27

## 2020-07-23 RX ADMIN — GABAPENTIN 800 MG: 400 CAPSULE ORAL at 13:48

## 2020-07-23 RX ADMIN — OXYCODONE HYDROCHLORIDE AND ACETAMINOPHEN 1 TABLET: 7.5; 325 TABLET ORAL at 02:17

## 2020-07-23 RX ADMIN — ESTRADIOL 0.5 MG: 1 TABLET ORAL at 08:21

## 2020-07-23 RX ADMIN — HYDROMORPHONE HYDROCHLORIDE 1 MG: 1 INJECTION, SOLUTION INTRAMUSCULAR; INTRAVENOUS; SUBCUTANEOUS at 04:36

## 2020-07-23 RX ADMIN — SODIUM CHLORIDE, PRESERVATIVE FREE 10 ML: 5 INJECTION INTRAVENOUS at 08:21

## 2020-07-23 RX ADMIN — CALCIUM CARBONATE 500 MG: 500 TABLET, CHEWABLE ORAL at 08:21

## 2020-07-23 RX ADMIN — MULTIPLE VITAMINS W/ MINERALS TAB 1 TABLET: TAB at 08:20

## 2020-07-23 RX ADMIN — HYDROMORPHONE HYDROCHLORIDE 1 MG: 1 INJECTION, SOLUTION INTRAMUSCULAR; INTRAVENOUS; SUBCUTANEOUS at 12:05

## 2020-07-23 RX ADMIN — LEVOTHYROXINE SODIUM 125 MCG: 125 TABLET ORAL at 06:29

## 2020-07-23 RX ADMIN — HYDROMORPHONE HYDROCHLORIDE 1 MG: 1 INJECTION, SOLUTION INTRAMUSCULAR; INTRAVENOUS; SUBCUTANEOUS at 01:16

## 2020-07-23 RX ADMIN — PANTOPRAZOLE SODIUM 40 MG: 40 TABLET, DELAYED RELEASE ORAL at 06:29

## 2020-07-23 RX ADMIN — LEVETIRACETAM 1000 MG: 500 TABLET ORAL at 08:20

## 2020-07-23 RX ADMIN — GABAPENTIN 800 MG: 400 CAPSULE ORAL at 08:20

## 2020-07-23 RX ADMIN — HYDROMORPHONE HYDROCHLORIDE 1 MG: 1 INJECTION, SOLUTION INTRAMUSCULAR; INTRAVENOUS; SUBCUTANEOUS at 08:17

## 2020-07-23 RX ADMIN — SODIUM CHLORIDE, PRESERVATIVE FREE 10 ML: 5 INJECTION INTRAVENOUS at 12:05

## 2020-07-23 RX ADMIN — TIZANIDINE 4 MG: 4 TABLET ORAL at 08:22

## 2020-07-23 RX ADMIN — MORPHINE SULFATE 1 MG: 2 INJECTION, SOLUTION INTRAMUSCULAR; INTRAVENOUS at 18:25

## 2020-07-23 RX ADMIN — Medication 5000 UNITS: at 08:20

## 2020-07-23 RX ADMIN — OXYCODONE HYDROCHLORIDE AND ACETAMINOPHEN 1 TABLET: 7.5; 325 TABLET ORAL at 10:55

## 2020-07-23 RX ADMIN — ATENOLOL 25 MG: 25 TABLET ORAL at 08:21

## 2020-07-23 ASSESSMENT — PAIN DESCRIPTION - PAIN TYPE
TYPE: ACUTE PAIN
TYPE: ACUTE PAIN

## 2020-07-23 ASSESSMENT — PAIN DESCRIPTION - ONSET: ONSET: ON-GOING

## 2020-07-23 ASSESSMENT — PAIN SCALES - GENERAL
PAINLEVEL_OUTOF10: 9
PAINLEVEL_OUTOF10: 8
PAINLEVEL_OUTOF10: 9
PAINLEVEL_OUTOF10: 8

## 2020-07-23 ASSESSMENT — PAIN DESCRIPTION - DESCRIPTORS
DESCRIPTORS: SHARP
DESCRIPTORS: SHARP

## 2020-07-23 ASSESSMENT — PAIN DESCRIPTION - ORIENTATION
ORIENTATION: RIGHT;UPPER
ORIENTATION: RIGHT;UPPER

## 2020-07-23 ASSESSMENT — PAIN DESCRIPTION - FREQUENCY
FREQUENCY: CONTINUOUS
FREQUENCY: CONTINUOUS

## 2020-07-23 ASSESSMENT — PAIN DESCRIPTION - LOCATION
LOCATION: ABDOMEN
LOCATION: ABDOMEN

## 2020-07-23 NOTE — PLAN OF CARE
Problem: Pain:  Goal: Pain level will decrease  Description: Pain level will decrease  7/23/2020 0945 by Felix Dotson RN  Outcome: Ongoing  7/23/2020 0056 by Santo Aase, RN  Outcome: Ongoing  Goal: Control of acute pain  Description: Control of acute pain  7/23/2020 0945 by Felix Dotson RN  Outcome: Ongoing  7/23/2020 0056 by Santo Aase, RN  Outcome: Ongoing  Goal: Control of chronic pain  Description: Control of chronic pain  7/23/2020 0945 by Felix Dotson RN  Outcome: Ongoing  7/23/2020 0056 by Santo Aase, RN  Outcome: Ongoing     Problem: Activity:  Goal: Risk for activity intolerance will decrease  Description: Risk for activity intolerance will decrease  7/23/2020 0945 by Felix Dotson RN  Outcome: Ongoing  7/23/2020 0056 by Santo Aase, RN  Outcome: Ongoing     Problem:  Bowel/Gastric:  Goal: Bowel function will improve  Description: Bowel function will improve  7/23/2020 0945 by Felix Dotson RN  Outcome: Ongoing  7/23/2020 0056 by Santo Aase, RN  Outcome: Ongoing  Goal: Diagnostic test results will improve  Description: Diagnostic test results will improve  7/23/2020 0945 by Felix Dotson RN  Outcome: Ongoing  7/23/2020 0056 by Santo Aase, RN  Outcome: Ongoing  Goal: Occurrences of nausea will decrease  Description: Occurrences of nausea will decrease  7/23/2020 0945 by Felix Dotson RN  Outcome: Ongoing  7/23/2020 0056 by Santo Aase, RN  Outcome: Ongoing  Goal: Occurrences of vomiting will decrease  Description: Occurrences of vomiting will decrease  7/23/2020 0945 by Felix Dotson RN  Outcome: Ongoing  7/23/2020 0056 by Santo Aase, RN  Outcome: Ongoing     Problem: Fluid Volume:  Goal: Maintenance of adequate hydration will improve  Description: Maintenance of adequate hydration will improve  7/23/2020 0945 by Felix Dotson RN  Outcome: Ongoing  7/23/2020 0056 by Santo Aase, RN  Outcome: Ongoing Problem: Health Behavior:  Goal: Ability to state signs and symptoms to report to health care provider will improve  Description: Ability to state signs and symptoms to report to health care provider will improve  7/23/2020 0945 by Megan Brunner RN  Outcome: Ongoing  7/23/2020 0056 by Hieu Wilson RN  Outcome: Ongoing     Problem: Physical Regulation:  Goal: Complications related to the disease process, condition or treatment will be avoided or minimized  Description: Complications related to the disease process, condition or treatment will be avoided or minimized  7/23/2020 0945 by Megan Brunner RN  Outcome: Ongoing  7/23/2020 0056 by Hieu Wilson RN  Outcome: Ongoing  Goal: Ability to maintain clinical measurements within normal limits will improve  Description: Ability to maintain clinical measurements within normal limits will improve  7/23/2020 0945 by Megan Brunner RN  Outcome: Ongoing  7/23/2020 0056 by Hieu Wilson RN  Outcome: Ongoing     Problem: Sensory:  Goal: Pain level will decrease  Description: Pain level will decrease  7/23/2020 0945 by Megan Brunner RN  Outcome: Ongoing  7/23/2020 0056 by Hieu Wilson RN  Outcome: Ongoing  Goal: Ability to identify factors that increase the pain will improve  Description: Ability to identify factors that increase the pain will improve  7/23/2020 0945 by Megan Brunner RN  Outcome: Ongoing  7/23/2020 0056 by Hieu Wilson RN  Outcome: Ongoing  Goal: Ability to notify healthcare provider of pain before it becomes unmanageable or unbearable will improve  Description: Ability to notify healthcare provider of pain before it becomes unmanageable or unbearable will improve  7/23/2020 0945 by Megan Brunner RN  Outcome: Ongoing  7/23/2020 0056 by Hieu Wilson RN  Outcome: Ongoing     Problem: Falls - Risk of:  Goal: Will remain free from falls  Description: Will remain free from falls  7/23/2020 0945 by [employer] Marysol Souza RN  Outcome: Ongoing  7/23/2020 0056 by Mariana Garzon RN  Outcome: Ongoing  Goal: Absence of physical injury  Description: Absence of physical injury  7/23/2020 0945 by Aaron Aguilar RN  Outcome: Ongoing  7/23/2020 0056 by Mariana Garzon RN  Outcome: Ongoing

## 2020-07-23 NOTE — DISCHARGE SUMMARY
medications which have NOT CHANGED    Details   levETIRAcetam (KEPPRA) 1000 MG tablet Take 1 tablet by mouth 2 times daily  Qty: 60 tablet, Refills: 5      LORazepam (ATIVAN) 1 MG tablet Take 1 mg by mouth as needed for Anxiety. pantoprazole (PROTONIX) 40 MG tablet Take 1 tablet by mouth every morning (before breakfast)  Qty: 90 tablet, Refills: 1      dicyclomine (BENTYL) 10 MG capsule Take 1 capsule by mouth 3 times daily as needed (abdominal pain)  Qty: 21 capsule, Refills: 3      oxyCODONE-acetaminophen (PERCOCET) 5-325 MG per tablet Take 1 tablet by mouth. Every 4-6 hours PRN      Cholecalciferol (VITAMIN D3) 5000 units TABS Take 1 tablet by mouth daily      estradiol (ESTRACE) 0.5 MG tablet Take 0.5 mg by mouth daily      atenolol (TENORMIN) 25 MG tablet Take 25 mg by mouth daily      Multiple Vitamin (MVI, BARIATRIC ADVANTAGE MULTI-FORMULA, CHEW TAB) Take 1 tablet by mouth daily  Qty: 30 tablet, Refills: 5      Calcium Citrate-Vitamin D (CALCIUM + VIT D, BARIATRIC ADVANTAGE, CHEWABLE TABLET) Take 1 tablet by mouth 2 times daily  Qty: 120 tablet, Refills: 5      levothyroxine (SYNTHROID) 125 MCG tablet Take 1 tablet by mouth Daily  Qty: 30 tablet, Refills: 0      gabapentin (NEURONTIN) 800 MG tablet Take 800 mg by mouth 3 times daily      PARoxetine (PAXIL) 30 MG tablet Take 50 mg by mouth nightly            Current Discharge Medication List      STOP taking these medications       promethazine (PHENERGAN) 12.5 MG tablet Comments:   Reason for Stopping:         tiZANidine (ZANAFLEX) 4 MG tablet Comments:   Reason for Stopping:               Discharge ROS:  A complete review of systems was asked and negative except for abd pain      Discharge Exam:    BP (!) 143/87   Pulse 70   Temp 98.1 °F (36.7 °C) (Oral)   Resp 22   Ht 5' 4\" (1.626 m)   Wt 272 lb 11.3 oz (123.7 kg)   SpO2 100%   BMI 46.81 kg/m²   General appearance:  NAD  Heart[de-identified] Normal s1/s2, RRR, no murmurs, gallops, or rubs.  No leg pneumonia or other acute findings. Subcentimeter nodule in the right lung base, costophrenic angle near the spine again noted. Even smaller tiny nodules again noted elsewhere in the lung bases. Organs: No non contrast evidence of acute process of the organs of the abdomen. No evidence of pancreatitis. No ureteral stone or hydronephrosis. Chronic air in the biliary tree again noted. Cholecystectomy has been performed. GI/Bowel: No bowel obstruction or other non contrast acute process demonstrated. No evidence of colitis, diverticulitis or appendicitis. Postsurgical changes from gastric bypass again noted. Pelvis: No acute abnormality of the pelvis. Peritoneum/Retroperitoneum: No free air, free fluid or abscess. Bones/Soft Tissues: No fracture or other acute osseous process. No evidence of acute process in the abdomen or pelvis. Ct Head Wo Contrast    Result Date: 7/17/2020  EXAMINATION: CT OF THE HEAD WITHOUT CONTRAST  7/17/2020 6:34 pm TECHNIQUE: CT of the head was performed without the administration of intravenous contrast. Dose modulation, iterative reconstruction, and/or weight based adjustment of the mA/kV was utilized to reduce the radiation dose to as low as reasonably achievable. COMPARISON: None. HISTORY: ORDERING SYSTEM PROVIDED HISTORY: seizure TECHNOLOGIST PROVIDED HISTORY: Reason for exam:->seizure Has a \"code stroke\" or \"stroke alert\" been called? ->No Is the patient pregnant?->No Reason for Exam: seizure Acuity: Acute Type of Exam: Initial Additional signs and symptoms: pt came today for outpt EGD procedure,began to have seizure then got admitted FINDINGS: BRAIN/VENTRICLES: There is no acute intracranial hemorrhage, mass effect or midline shift. No abnormal extra-axial fluid collection. The gray-white differentiation is maintained without evidence of an acute infarct. There is no evidence of hydrocephalus. ORBITS: The visualized portion of the orbits demonstrate no acute abnormality. SINUSES: The visualized paranasal sinuses and mastoid air cells demonstrate no acute abnormality. SOFT TISSUES/SKULL:  No acute abnormality of the visualized skull or soft tissues. No acute intracranial abnormality. Ct Abdomen Pelvis W Iv Contrast Additional Contrast? None    Result Date: 7/21/2020  EXAMINATION: CT OF THE ABDOMEN AND PELVIS WITH CONTRAST 7/21/2020 1:07 am TECHNIQUE: CT of the abdomen and pelvis was performed with the administration of intravenous contrast. Multiplanar reformatted images are provided for review. Dose modulation, iterative reconstruction, and/or weight based adjustment of the mA/kV was utilized to reduce the radiation dose to as low as reasonably achievable. COMPARISON: None HISTORY: ORDERING SYSTEM PROVIDED HISTORY: ruq pain TECHNOLOGIST PROVIDED HISTORY: IV contrast only. Thank you. Additional Contrast?->None Reason for exam:->ruq pain Reason for Exam: pain under right breast area/ sob FINDINGS: Lower Chest: There are no focal consolidations. Stable pulmonary nodules are seen. These are likely benign and no follow-up is indicated. Organs: Pneumobilia is noted. There has been a cholecystectomy. Mild intrahepatic and extrahepatic bile duct dilatation is seen, likely related to post cholecystectomy status. Spleen, adrenal glands, pancreas, and kidneys are unremarkable. GI/Bowel: There is evidence of gastric sleeve surgery. There is no evidence of bowel obstruction. The appendix is not visualized. Pelvis: The bladder is unremarkable. There has been a hysterectomy. There is no free fluid. Peritoneum/Retroperitoneum: There is no evidence of free air or lymphadenopathy. Bones/Soft Tissues: No destructive osseous lesions are identified. 1. No acute findings. 2. Pneumobilia with mild intrahepatic and extrahepatic bile duct dilatation, likely related to post cholecystectomy status and prior biliary intervention. This is not significantly changed.      Fl Less Than 1 were noted in the pelvis. The patient was given 2 test doses of none diluted Gastrografin. Each dose consisted of 1 mouthful. These were performed in the standing left posterior oblique position under fluoroscopy. Immediate vomiting was elicited with each test dose. No esophageal stricture was noted. No gastric sleeve anastomotic leak was noted. No gastric sleeve-jejunal obstruction was identified. Due to severe vomiting, the patient was not able to tolerate any further investigation. The patient was only able to tolerate 2 mouthfuls of non diluted Gastrografin. Both episodes elicited severe vomiting. No esophageal stricture was identified. No gastro jejunal anastomotic leak. EKG     Rhythm: normal sinus   Rate: normal  Clinical Impression: no acute changes        The patient was seen and examined on day of discharge and this discharge summary is in conjunction with any daily progress note from day of discharge. Time Spent on discharge is   >35  min  in the examination, evaluation, counseling and review of medications and discharge plan.             Signed:    Kenyetta Leroy MD   7/23/2020

## 2020-07-23 NOTE — PROGRESS NOTES
Attending Progress Note      PCP: Marek Rodriguez MD    Patient: Yu Guerin   Gender: female  : 1970   Age: 44 y.o. MRN: [id number]      Date of Admission: 2020    Chief Complaint:   Chief Complaint   Patient presents with    Abdominal Pain           Subjective: abd pain . Tj Morales N/V         Medications:  Reviewed  Infusion Medications   Scheduled Medications    sodium chloride flush  10 mL Intravenous 2 times per day    PARoxetine  50 mg Oral Nightly    gabapentin  800 mg Oral TID    levothyroxine  125 mcg Oral Daily    estradiol  0.5 mg Oral Daily    atenolol  25 mg Oral Daily    tiZANidine  4 mg Oral BID    vitamin D  5,000 Units Oral Daily    pantoprazole  40 mg Oral QAM AC    levETIRAcetam  1,000 mg Oral BID    therapeutic multivitamin-minerals  1 tablet Oral Daily    calcium carbonate  500 mg Oral BID     PRN Meds: HYDROmorphone, sodium chloride flush, acetaminophen, ondansetron, oxyCODONE-acetaminophen, dicyclomine, LORazepam, promethazine    No intake or output data in the 24 hours ending 20    Exam:  /74   Pulse 74   Temp 98.3 °F (36.8 °C) (Oral)   Resp 16   Ht 5' 4\" (1.626 m)   Wt 272 lb 0.8 oz (123.4 kg)   SpO2 100%   BMI 46.70 kg/m²   General appearance: No distress,   Respiratory:  good air entry , no Rales , No wheezing, or rhonchi,  Cardiovascular: RRR, with normal S1/S2 . Abdomen : Soft, tender, non-distended  , normal bowel sounds. Legs : No edema bilaterally. No DVT signs ,    Neurologic:  Alert and oriented ,        Labs:   Recent Labs     20  0037   WBC 5.6   HGB 10.3*   HCT 32.8*        Recent Labs     20  2237      K 4.1      CO2 24   BUN 19   CREATININE 0.8   CALCIUM 10.3     Recent Labs     20  2237   AST 24   ALT 26   BILITOT 0.2   ALKPHOS 121     No results for input(s): INR in the last 72 hours. No results for input(s): Timothy Cruz in the last 72 hours.     Assessment/Plan:    Active Hospital Problems    Diagnosis Date Noted    Abdominal pain [R10.9] 07/21/2020     Nausea and vomiting   Anxiety   pseudoseizure         Assessment/Plan:   Tele   IVF  Increase pain meds . .   surg /GI consult   Home meds , reviewed and resumed as appropriate   Symptoms releif/Pain control  DVT proph      DVT Prophylaxis   Diet: DIET GENERAL;  Dietary Nutrition Supplements: Low Calorie High Protein Supplement  Code Status: Full Code    Treatment progress and plan was d/w pt/family .         Mauricio Andres MD

## 2020-07-23 NOTE — DISCHARGE SUMMARY
TABLET  Take 1 tablet by mouth 2 times daily     dicyclomine 10 MG capsule  Commonly known as:  Bentyl  Take 1 capsule by mouth 3 times daily as needed (abdominal pain)     estradiol 0.5 MG tablet  Commonly known as:  ESTRACE     LORazepam 1 MG tablet  Commonly known as:  ATIVAN     MVI (BARIATRIC ADVANTAGE MULTI-FORMULA) CHEW TAB  Take 1 tablet by mouth daily     Neurontin 800 MG tablet  Generic drug:  gabapentin     ondansetron 4 MG disintegrating tablet  Commonly known as:  Zofran ODT  Take 1 tablet by mouth every 8 hours as needed for Nausea or Vomiting     oxyCODONE-acetaminophen 5-325 MG per tablet  Commonly known as:  PERCOCET     pantoprazole 40 MG tablet  Commonly known as:  PROTONIX  Take 1 tablet by mouth every morning (before breakfast)     PARoxetine 30 MG tablet  Commonly known as:  PAXIL     tiZANidine 4 MG tablet  Commonly known as:  ZANAFLEX     Vitamin D3 125 MCG (5000 UT) Tabs           Where to Get Your Medications      These medications were sent to [address] [place] [address]), [place] - [address] [address]  [address] - [address] [phone] [address] [facility] [place] [address]    Phone:  [phone]   · levETIRAcetam 1000 MG tablet  · promethazine 12.5 MG tablet         Activity: as tolerated  Diet:full liquidsn/a    Follow-up with Dr. Shannon Soto office by calling [phone]. The Office staff will determine follow up time per protocol.     Signed:  Staci Ralph MD

## 2020-07-24 LAB
EKG ATRIAL RATE: 69 BPM
EKG DIAGNOSIS: NORMAL
EKG P AXIS: 45 DEGREES
EKG P-R INTERVAL: 204 MS
EKG Q-T INTERVAL: 380 MS
EKG QRS DURATION: 98 MS
EKG QTC CALCULATION (BAZETT): 407 MS
EKG R AXIS: -10 DEGREES
EKG T AXIS: 47 DEGREES
EKG VENTRICULAR RATE: 69 BPM

## 2020-07-24 NOTE — FLOWSHEET NOTE
Discharge summary reviewed with pt and voices understanding. Pt port access flushed with heparin lock and removed. Pt with dressing placed and dated over site. Pt discharged per w/c to her mom in car.

## 2020-08-02 ENCOUNTER — HOSPITAL ENCOUNTER (EMERGENCY)
Age: 50
Discharge: LEFT BEFORE DISCHARGE | End: 2020-08-02
Attending: EMERGENCY MEDICINE
Payer: COMMERCIAL

## 2020-08-02 VITALS
HEART RATE: 65 BPM | SYSTOLIC BLOOD PRESSURE: 110 MMHG | TEMPERATURE: 98.1 F | BODY MASS INDEX: 45.07 KG/M2 | RESPIRATION RATE: 16 BRPM | WEIGHT: 264 LBS | DIASTOLIC BLOOD PRESSURE: 61 MMHG | OXYGEN SATURATION: 97 % | HEIGHT: 64 IN

## 2020-08-02 LAB
BACTERIA: NEGATIVE /HPF
BILIRUBIN URINE: ABNORMAL MG/DL
BLOOD, URINE: NEGATIVE
CLARITY: CLEAR
COLOR: YELLOW
GLUCOSE, URINE: NEGATIVE MG/DL
ICTOTEST: NEGATIVE
KETONES, URINE: NEGATIVE MG/DL
LEUKOCYTE ESTERASE, URINE: NEGATIVE
MUCUS: ABNORMAL HPF
NITRITE URINE, QUANTITATIVE: NEGATIVE
PH, URINE: 5 (ref 5–8)
PROTEIN UA: NEGATIVE MG/DL
RBC URINE: 1 /HPF (ref 0–6)
SPECIFIC GRAVITY UA: 1.03 (ref 1–1.03)
SQUAMOUS EPITHELIAL: 4 /HPF
TRICHOMONAS: ABNORMAL /HPF
UROBILINOGEN, URINE: NORMAL MG/DL (ref 0.2–1)
WBC UA: 1 /HPF (ref 0–5)

## 2020-08-02 PROCEDURE — 99283 EMERGENCY DEPT VISIT LOW MDM: CPT

## 2020-08-02 PROCEDURE — 81001 URINALYSIS AUTO W/SCOPE: CPT

## 2020-08-02 PROCEDURE — 83690 ASSAY OF LIPASE: CPT

## 2020-08-02 RX ORDER — LIDOCAINE 4 G/G
1 PATCH TOPICAL ONCE
Status: DISCONTINUED | OUTPATIENT
Start: 2020-08-02 | End: 2020-08-02 | Stop reason: HOSPADM

## 2020-08-02 RX ORDER — LIDOCAINE HYDROCHLORIDE 20 MG/ML
15 SOLUTION OROPHARYNGEAL ONCE
Status: DISCONTINUED | OUTPATIENT
Start: 2020-08-02 | End: 2020-08-02 | Stop reason: HOSPADM

## 2020-08-02 RX ORDER — MAGNESIUM HYDROXIDE/ALUMINUM HYDROXICE/SIMETHICONE 120; 1200; 1200 MG/30ML; MG/30ML; MG/30ML
30 SUSPENSION ORAL ONCE
Status: DISCONTINUED | OUTPATIENT
Start: 2020-08-02 | End: 2020-08-02 | Stop reason: HOSPADM

## 2020-08-02 RX ORDER — HALOPERIDOL 5 MG/ML
2 INJECTION INTRAMUSCULAR ONCE
Status: DISCONTINUED | OUTPATIENT
Start: 2020-08-02 | End: 2020-08-02 | Stop reason: HOSPADM

## 2020-08-02 ASSESSMENT — ENCOUNTER SYMPTOMS
SORE THROAT: 0
BACK PAIN: 0
NAUSEA: 1
EYE REDNESS: 0
RHINORRHEA: 0
VOMITING: 1
SHORTNESS OF BREATH: 0
COUGH: 0
DIARRHEA: 1
ABDOMINAL PAIN: 1

## 2020-08-02 ASSESSMENT — PAIN SCALES - GENERAL: PAINLEVEL_OUTOF10: 8

## 2020-08-02 NOTE — ED NOTES
Port accessed. Flushes easily.  Unable to get blood off of port at this time     Lawson Danielson, [address] [facility]  08/02/20 4571

## 2020-08-02 NOTE — ED NOTES
Pt refused blood work and all meds. Requesting AMA form. St \"I dont appreciate the way that doctor talked to me like im a drug addict, I have percocet at home I didn't come here for drugs\". Pt advised appropriate orders have been placed in order to help pt with sx and that no one is accusing of drug seeking. Pt is cooperative but still st wants to leave AMA. All meds returned to [facility] except for lidocaine patch because patch had already been opened.  All other drugs scanned and returned to [address] [place] [address]  08/02/20 1111

## 2020-08-02 NOTE — ED NOTES
Pt refusing lidocaine viscous and maalox at this time. Also refusing blood work.      Milton Martel RN  08/02/20 1715

## 2020-08-02 NOTE — ED PROVIDER NOTES
Triage Chief Complaint:   Abdominal Pain    [place]:  Aundrea Mitchell is a 44 y.o. female that presents with R sided abd pain x a few weeks. Increasning. Feel that abdomen/flank is bulging over right side. 4 episodes of NBNB emesis today. Reports chills but no fevers. Occasional nonbloody diarrhea. Denies constipation. No dysuria or increased urinary frequency. No chest pain or shortness of breath. Hx of chronic pancreatitis and chronic abdominal pain. States she tried Percocet at home with no improvement in her symptoms. ROS:   Review of Systems   Constitutional: Negative for chills and fever. HENT: Negative for congestion, rhinorrhea and sore throat. Eyes: Negative for redness and visual disturbance. Respiratory: Negative for cough and shortness of breath. Cardiovascular: Negative for chest pain and leg swelling. Gastrointestinal: Positive for abdominal pain (right sided), diarrhea, nausea and vomiting. Genitourinary: Positive for flank pain (right). Negative for dysuria and frequency. Musculoskeletal: Negative for arthralgias and back pain. Skin: Negative for rash and wound. Neurological: Negative for syncope and headaches. Psychiatric/Behavioral: Negative. Negative for hallucinations and suicidal ideas. Past Medical History:   Diagnosis Date    Acid reflux     Anemia     Anxiety     Arthritis     Hands, Back And Ankles    Bleeding ulcer 2014    \"I Had Ulcers In My Stomach And Colon\"/ per pt on 8/12/2019\"they said recently having some blood in my stomach- in July ( 2019)could not find where coming from \"    Bronchitis Last Episode 2014    Chronic back pain     Chronic pain     Sees Dr. Karis Yu COPD (chronic obstructive pulmonary disease) ([facility] [address]     Sees Dr. Kimberlee Pérez Depression     Fibromyalgia Dx 2013    H/O echocardiogram 8/11/15    EF >55%. LA to be at the upper limit of normal in size.  LV hypertrophy with normal LV systolic, but abnormal diastolic function. Normal valvular structures and function.  H/O echocardiogram 08/30/2018    EF 55-60%    Hiatal hernia     History of blood transfusion 09/2015 And 2018    No Reaction To Blood Transfusions Received    [place] (hard of hearing)     Right Ear    Hx of cardiac catheterization 10/24/2010    Angiographically normal coronary arteries w/ normal LV function and wall motion.  Hx of transesophageal echocardiography (PILO) for monitoring 12/2/2010    EF 55-60%. WNL.    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 name    Kidney cysts     \"they found that I have a kidney cyst but not sure which side\" per pt on 7/15/2020    Lupus ([address] Dx 2013    \"Rheumatoid Lupus\"    MDRO (multiple drug resistant organisms) resistance     4 months ago chest from mediport    Morbid obesity ([address]     MRSA bacteremia     Nausea     \"Most Of The Time\"    Pain management     Sees Dr. Attila Patino, Once A Month    Pancreatitis chronic Dx 2001    Pneumonia Dx 11-15    Shortness of breath on exertion     Shortness of breath on exertion     Sleep apnea     Has CPAP\"no longer use the cpap since lost weight\"    Staph infection Dx 11-15    Left Foot    Staph infection Dx 11-15    \"Left Foot\"    Teeth missing     Upper And Lower    Thyroid disease     Wears glasses     To Read     Past Surgical History:   Procedure Laterality Date    APPENDECTOMY  02/1998    Done When Tubes And Ovaries Were Removed    CARDIAC CATHETERIZATION  10/24/2010    CATHETER REMOVAL N/A 7/16/2019    PORT REMOVAL performed by Romina Echevarria MD at [address]  08/27/1991    CHOLECYSTECTOMY, LAPAROSCOPIC  1990's    COLONOSCOPY  Last Done In 2000's   [facility] DENTAL SURGERY      Teeth Extracted In Past    ENDOSCOPY, COLON, DIAGNOSTIC  Last Done In 2018    FOOT DEBRIDEMENT Left 6/16/2019    FIRST METATARSAL DEBRIDEMENT INCISION AND DRAINAGE. EXCISION OF ULCER.  RESECTION OF BONE. 1ST METATARSAL POWER LAVAGE AND BONE CEMENT performed by Jihan Baugh DPM at [address] Left Last Done In 2016     Dr. Chad Hernandez, \" About 6 Surgeries Done Because Of Staph Infection\"    HIATAL HERNIA REPAIR N/A 2/12/2019    HERNIA HIATAL LAPAROSCOPIC ROBOTIC performed by Jam Rice MD at [facility]  10/1997    Partial Abdominal Hysterectomy    INSERTION / REMOVAL / REPLACEMENT VENOUS ACCESS CATHETER N/A 8/15/2019    PORT INSERTION performed by Jam Rice MD at [address] [place] [address] [place]    removed after 6 months    LUNG REMOVAL, PARTIAL Left 2008    Benign    OTHER SURGICAL HISTORY  02/1998    \"Tubes And Ovaries Removed, Appendectomy Also Done\"    OTHER SURGICAL HISTORY  Last Done 7-15-16    Mediport Insertion \"Total Of Six Done, Removed Last Mediport In 2014\"    PORT SURGERY N/A 1/15/2020    PORT REMOVAL performed by Jam Rice MD at [address] [facility] N/A 7/6/2020    PORT INSERTION performed by Jam Rice MD at [facility] N/A 2/12/2019    GASTRECTOMY SLEEVE LAPAROSCOPIC ROBOTIC performed by Jam Rice MD at [address] [place] [address]  08/27/2018    UPPER GASTROINTESTINAL ENDOSCOPY N/A 4/2/2019    EGD CONTROL HEMORRHAGE with epi injection at bleeding site performed by Jam Rice MD at [place] [address] 6/19/2019    EGD DIAGNOSTIC ONLY performed by Jam Rice MD at [address] [place] [address] N/A 7/22/2019    EGD DIAGNOSTIC ONLY performed by Faye Wilson MD at [place] [address] 10/14/2019    EGD DIAGNOSTIC ONLY performed by Rachel Stark MD at [facility] N/A 7/17/2020    EGJ DILATATION BALLOON WITH 18-20 MM BALLOON, DILATED TO 20 MM. performed by Rachel Stark MD at [facility] History   Problem Relation Age of Onset    Diabetes Mother         \"Borderline Diabetes\"    High Blood Pressure Mother     Obesity Mother     Arthritis Mother     Heart Disease Mother     High Cholesterol Mother     Vision Loss Mother     Diabetes Father     High Blood Pressure Father     Asthma Father     Cancer Father         prostate cancer    High Blood Pressure Brother     Asthma Son     Vision Loss Son     Lupus Daughter     Other Daughter         \"Alot Of Female Problems\"     Social History     Socioeconomic History    Marital status:      Spouse name: Not on file    Number of children: Not on file    Years of education: Not on file    Highest education level: Not on file   Occupational History    Not on file   Social Needs    Financial resource strain: Not on file    Food insecurity     Worry: Not on file     Inability: Not on file    Transportation needs     Medical: Not on file     Non-medical: Not on file   Tobacco Use    Smoking status: Never Smoker    Smokeless tobacco: Never Used   Substance and Sexual Activity    Alcohol use: No     Alcohol/week: 0.0 standard drinks    Drug use: No    Sexual activity: Not Currently   Lifestyle    Physical activity     Days per week: Not on file     Minutes per session: Not on file    Stress: Not on file   Relationships    Social connections     Talks on phone: Not on file     Gets together: Not on file     Attends [employer] service: Not on file     Active member of club or organization: Not on file     Attends meetings of clubs or organizations: Not on file     Relationship status: Not on file    Intimate partner violence     Fear of current or ex partner: Not on file     Emotionally abused: Not on file     Physically abused: Not on file     Forced sexual activity: Not on file   Other Topics Concern    Not on file   Social History Narrative    Not on file     Current Facility-Administered Medications   Medication Dose Route Frequency Provider Last Rate Last Dose    lidocaine 4 % external patch 1 patch  1 patch Transdermal Once Kevin Lopez MD        haloperidol lactate (HALDOL) injection 2 mg  2 mg Intravenous Once Kevin Lopez MD        aluminum & magnesium hydroxide-simethicone (MAALOX) 200-200-20 MG/5ML suspension 30 mL  30 mL Oral Once Kevin Lopez MD        lidocaine viscous hcl (XYLOCAINE) 2 % solution 15 mL  15 mL Mouth/Throat Once Kevin Lopez MD        hyoscyamine (LEVSIN/SL) sublingual tablet 125 mcg  125 mcg Sublingual Once Kevin Lopez MD         Current Outpatient Medications   Medication Sig Dispense Refill    ondansetron (ZOFRAN ODT) 4 MG disintegrating tablet Take 1 tablet by mouth every 8 hours as needed for Nausea or Vomiting 30 tablet 5    levETIRAcetam (KEPPRA) 1000 MG tablet Take 1 tablet by mouth 2 times daily 60 tablet 5    LORazepam (ATIVAN) 1 MG tablet Take 1 mg by mouth as needed for Anxiety.  pantoprazole (PROTONIX) 40 MG tablet Take 1 tablet by mouth every morning (before breakfast) 90 tablet 1    dicyclomine (BENTYL) 10 MG capsule Take 1 capsule by mouth 3 times daily as needed (abdominal pain) 21 capsule 3    oxyCODONE-acetaminophen (PERCOCET) 5-325 MG per tablet Take 1 tablet by mouth.  Every 4-6 hours PRN      Cholecalciferol (VITAMIN D3) 5000 units TABS Take 1 tablet by mouth daily      estradiol (ESTRACE) 0.5 MG tablet Take 0.5 mg by mouth daily      atenolol (TENORMIN) 25 MG tablet Take 25 mg by mouth daily      Multiple Vitamin (MVI, BARIATRIC ADVANTAGE MULTI-FORMULA, CHEW TAB) Take 1 tablet by mouth daily 30 tablet 5    Calcium Citrate-Vitamin D (CALCIUM + VIT D, BARIATRIC ADVANTAGE, CHEWABLE TABLET) Take 1 tablet by mouth 2 times daily 120 tablet 5    levothyroxine (SYNTHROID) 125 MCG tablet Take 1 tablet by mouth Daily (Patient taking differently: Take 125 mcg by mouth nightly ) 30 tablet 0    gabapentin (NEURONTIN) 800 MG tablet Take 800 mg by mouth 3 times daily      PARoxetine (PAXIL) 30 MG tablet Take 50 mg by mouth nightly        Allergies   Allergen Reactions    Aspirin Palpitations     \"My Heart Rate Elevates\"    Shellfish-Derived Products Swelling    Toradol [Ketorolac Tromethamine] Rash    Compazine [Prochlorperazine]     Reglan [Metoclopramide] Itching       Nursing Notes Reviewed     Physical Exam:   ED Triage Vitals   Enc Vitals Group      BP 08/02/20 0417 110/61      Pulse 08/02/20 0417 65      Resp 08/02/20 0417 16      Temp 08/02/20 0417 98.1 °F (36.7 °C)      Temp Source 08/02/20 0417 Oral      SpO2 08/02/20 0417 97 %      Weight 08/02/20 0423 264 lb (119.7 kg)      Height 08/02/20 0423 5' 4\" (1.626 m)      Head Circumference --       Peak Flow --       Pain Score --       Pain Loc --       Pain Edu? --       Excl. in GC? --      /61   Pulse 65   Temp 98.1 °F (36.7 °C) (Oral)   Resp 16   Ht 5' 4\" (1.626 m)   Wt 264 lb (119.7 kg)   SpO2 97%   BMI 45.32 kg/m²   My pulse ox interpretation is - normal  Physical Exam  Constitutional:       General: She is not in acute distress. Appearance: She is well-developed. She is obese. She is not toxic-appearing or diaphoretic. HENT:      Head: Normocephalic and atraumatic. Eyes:      General:         Right eye: No discharge. Left eye: No discharge. Conjunctiva/sclera: Conjunctivae normal.   Cardiovascular:      Rate and Rhythm: Normal rate and regular rhythm. Pulmonary:      Effort: Pulmonary effort is normal. No respiratory distress. Breath sounds: Normal breath sounds. Abdominal:      General: There is no distension. Palpations: Abdomen is soft. Tenderness: There is abdominal tenderness (epigastric and RUQ).  There is no guarding or rebound. Musculoskeletal: Normal range of motion. General: No swelling or signs of injury. Back:    Skin:     General: Skin is warm and dry. Neurological:      General: No focal deficit present. Mental Status: She is alert. Cranial Nerves: No cranial nerve deficit. Psychiatric:         Mood and Affect: Mood normal.         Behavior: Behavior normal.         I have reviewed and interpreted all of the currently available lab results from this visit (if applicable):  No results found for this visit on 08/02/20. Radiographs (if obtained):  [] The following radiograph was interpreted by myself in the absence of a radiologist:  [x]Radiologist's Report Reviewed:  No orders to display         EKG (if obtained): (All EKG's are interpreted by myself in the absence of a cardiologist)    MDM:  Differential diagnoses considered include but are not limited to acute on chronic pancreatitis, gastritis, GERD, gastroenteritis, enteritis, acute on chronic abdominal pain, urinary tract infection, pyelonephritis, renal colic. Basic labs ordered, port accessed but unable to draw blood work. I ordered patient multiple medications to treat her symptoms including Haldol, Maalox, viscous lidocaine, Levsin and lidocaine patch. Patient refused all of these medications. She then refused further lab draws. She is elected to leave the emergency department without any further evaluation. She will leave [address] Patient advised that leaving the hospital is not recommended and could result in worsening injury, illness, or death. Patient expressed an understanding and was able to repeat my warning back to me. I believe that the patient fully understood the conversation and is capable of making this decision. I informed the patient that they are welcome to return to the ED for further evaluation if they choose to do so.     I did don appropriate PPE (including N95 face mask, protective eye

## 2020-08-05 ENCOUNTER — APPOINTMENT (OUTPATIENT)
Dept: GENERAL RADIOLOGY | Age: 50
End: 2020-08-05
Payer: COMMERCIAL

## 2020-08-05 ENCOUNTER — HOSPITAL ENCOUNTER (OUTPATIENT)
Age: 50
Setting detail: OBSERVATION
Discharge: HOME | End: 2020-08-09
Attending: EMERGENCY MEDICINE | Admitting: FAMILY MEDICINE
Payer: COMMERCIAL

## 2020-08-05 LAB
ALBUMIN SERPL-MCNC: 4.2 GM/DL (ref 3.4–5)
ALP BLD-CCNC: 111 IU/L (ref 40–129)
ALT SERPL-CCNC: 19 U/L (ref 10–40)
ANION GAP SERPL CALCULATED.3IONS-SCNC: 9 MMOL/L (ref 4–16)
AST SERPL-CCNC: 17 IU/L (ref 15–37)
BACTERIA: ABNORMAL /HPF
BASOPHILS ABSOLUTE: 0 K/CU MM
BASOPHILS RELATIVE PERCENT: 0.7 % (ref 0–1)
BILIRUB SERPL-MCNC: 0.2 MG/DL (ref 0–1)
BILIRUBIN URINE: NEGATIVE MG/DL
BLOOD, URINE: NEGATIVE
BUN BLDV-MCNC: 21 MG/DL (ref 6–23)
CALCIUM SERPL-MCNC: 10.3 MG/DL (ref 8.3–10.6)
CHLORIDE BLD-SCNC: 106 MMOL/L (ref 99–110)
CLARITY: CLEAR
CO2: 26 MMOL/L (ref 21–32)
COLOR: YELLOW
CREAT SERPL-MCNC: 0.7 MG/DL (ref 0.6–1.1)
DIFFERENTIAL TYPE: ABNORMAL
EOSINOPHILS ABSOLUTE: 0.2 K/CU MM
EOSINOPHILS RELATIVE PERCENT: 5.3 % (ref 0–3)
GFR AFRICAN AMERICAN: >60 ML/MIN/1.73M2
GFR NON-AFRICAN AMERICAN: >60 ML/MIN/1.73M2
GLUCOSE BLD-MCNC: 93 MG/DL (ref 70–99)
GLUCOSE, URINE: NEGATIVE MG/DL
HCT VFR BLD CALC: 36.6 % (ref 37–47)
HEMOGLOBIN: 11.3 GM/DL (ref 12.5–16)
IMMATURE NEUTROPHIL %: 0.5 % (ref 0–0.43)
KETONES, URINE: NEGATIVE MG/DL
LEUKOCYTE ESTERASE, URINE: NEGATIVE
LIPASE: 21 IU/L (ref 13–60)
LYMPHOCYTES ABSOLUTE: 1.9 K/CU MM
LYMPHOCYTES RELATIVE PERCENT: 42.7 % (ref 24–44)
MCH RBC QN AUTO: 26.2 PG (ref 27–31)
MCHC RBC AUTO-ENTMCNC: 30.9 % (ref 32–36)
MCV RBC AUTO: 84.9 FL (ref 78–100)
MONOCYTES ABSOLUTE: 0.3 K/CU MM
MONOCYTES RELATIVE PERCENT: 6.7 % (ref 0–4)
MUCUS: ABNORMAL HPF
NITRITE URINE, QUANTITATIVE: NEGATIVE
NUCLEATED RBC %: 0 %
PDW BLD-RTO: 13.6 % (ref 11.7–14.9)
PH, URINE: 5 (ref 5–8)
PLATELET # BLD: 197 K/CU MM (ref 140–440)
PMV BLD AUTO: 10 FL (ref 7.5–11.1)
POTASSIUM SERPL-SCNC: 4.3 MMOL/L (ref 3.5–5.1)
PRO-BNP: 186.8 PG/ML
PROTEIN UA: NEGATIVE MG/DL
RBC # BLD: 4.31 M/CU MM (ref 4.2–5.4)
RBC URINE: <1 /HPF (ref 0–6)
SEGMENTED NEUTROPHILS ABSOLUTE COUNT: 1.9 K/CU MM
SEGMENTED NEUTROPHILS RELATIVE PERCENT: 44.1 % (ref 36–66)
SODIUM BLD-SCNC: 141 MMOL/L (ref 135–145)
SPECIFIC GRAVITY UA: 1.03 (ref 1–1.03)
SQUAMOUS EPITHELIAL: 3 /HPF
TOTAL IMMATURE NEUTOROPHIL: 0.02 K/CU MM
TOTAL NUCLEATED RBC: 0 K/CU MM
TOTAL PROTEIN: 6.8 GM/DL (ref 6.4–8.2)
TRANSITIONAL EPITHELIAL: <1 /HPF
TRICHOMONAS: ABNORMAL /HPF
TROPONIN T: <0.01 NG/ML
UROBILINOGEN, URINE: NORMAL MG/DL (ref 0.2–1)
WBC # BLD: 4.3 K/CU MM (ref 4–10.5)
WBC UA: 1 /HPF (ref 0–5)

## 2020-08-05 PROCEDURE — 93005 ELECTROCARDIOGRAM TRACING: CPT | Performed by: PHYSICIAN ASSISTANT

## 2020-08-05 PROCEDURE — 96365 THER/PROPH/DIAG IV INF INIT: CPT

## 2020-08-05 PROCEDURE — 71045 X-RAY EXAM CHEST 1 VIEW: CPT

## 2020-08-05 PROCEDURE — 96375 TX/PRO/DX INJ NEW DRUG ADDON: CPT

## 2020-08-05 PROCEDURE — 6370000000 HC RX 637 (ALT 250 FOR IP): Performed by: FAMILY MEDICINE

## 2020-08-05 PROCEDURE — 6360000002 HC RX W HCPCS: Performed by: PHYSICIAN ASSISTANT

## 2020-08-05 PROCEDURE — 96376 TX/PRO/DX INJ SAME DRUG ADON: CPT

## 2020-08-05 PROCEDURE — 84484 ASSAY OF TROPONIN QUANT: CPT

## 2020-08-05 PROCEDURE — 6360000002 HC RX W HCPCS: Performed by: EMERGENCY MEDICINE

## 2020-08-05 PROCEDURE — 85025 COMPLETE CBC W/AUTO DIFF WBC: CPT

## 2020-08-05 PROCEDURE — 81001 URINALYSIS AUTO W/SCOPE: CPT

## 2020-08-05 PROCEDURE — 6360000002 HC RX W HCPCS

## 2020-08-05 PROCEDURE — 83880 ASSAY OF NATRIURETIC PEPTIDE: CPT

## 2020-08-05 PROCEDURE — 2580000003 HC RX 258: Performed by: PHYSICIAN ASSISTANT

## 2020-08-05 PROCEDURE — 80053 COMPREHEN METABOLIC PANEL: CPT

## 2020-08-05 PROCEDURE — 6360000002 HC RX W HCPCS: Performed by: FAMILY MEDICINE

## 2020-08-05 PROCEDURE — G0378 HOSPITAL OBSERVATION PER HR: HCPCS

## 2020-08-05 PROCEDURE — 83690 ASSAY OF LIPASE: CPT

## 2020-08-05 PROCEDURE — 2580000003 HC RX 258: Performed by: FAMILY MEDICINE

## 2020-08-05 PROCEDURE — 99285 EMERGENCY DEPT VISIT HI MDM: CPT

## 2020-08-05 PROCEDURE — 93010 ELECTROCARDIOGRAM REPORT: CPT | Performed by: INTERNAL MEDICINE

## 2020-08-05 PROCEDURE — 96374 THER/PROPH/DIAG INJ IV PUSH: CPT

## 2020-08-05 RX ORDER — POTASSIUM CHLORIDE 7.45 MG/ML
10 INJECTION INTRAVENOUS PRN
Status: DISCONTINUED | OUTPATIENT
Start: 2020-08-05 | End: 2020-08-09 | Stop reason: HOSPADM

## 2020-08-05 RX ORDER — POLYETHYLENE GLYCOL 3350 17 G/17G
17 POWDER, FOR SOLUTION ORAL DAILY PRN
Status: DISCONTINUED | OUTPATIENT
Start: 2020-08-05 | End: 2020-08-09 | Stop reason: HOSPADM

## 2020-08-05 RX ORDER — POTASSIUM CHLORIDE 20 MEQ/1
40 TABLET, EXTENDED RELEASE ORAL PRN
Status: DISCONTINUED | OUTPATIENT
Start: 2020-08-05 | End: 2020-08-09 | Stop reason: HOSPADM

## 2020-08-05 RX ORDER — DICYCLOMINE HYDROCHLORIDE 10 MG/1
10 CAPSULE ORAL 3 TIMES DAILY PRN
Status: DISCONTINUED | OUTPATIENT
Start: 2020-08-05 | End: 2020-08-09 | Stop reason: HOSPADM

## 2020-08-05 RX ORDER — SODIUM PHOSPHATE, DIBASIC AND SODIUM PHOSPHATE, MONOBASIC 7; 19 G/133ML; G/133ML
1 ENEMA RECTAL DAILY PRN
Status: DISCONTINUED | OUTPATIENT
Start: 2020-08-05 | End: 2020-08-09 | Stop reason: HOSPADM

## 2020-08-05 RX ORDER — CALCIUM CARBONATE 200(500)MG
750 TABLET,CHEWABLE ORAL 3 TIMES DAILY PRN
Status: DISCONTINUED | OUTPATIENT
Start: 2020-08-05 | End: 2020-08-09 | Stop reason: HOSPADM

## 2020-08-05 RX ORDER — ACETAMINOPHEN 650 MG/1
650 SUPPOSITORY RECTAL EVERY 6 HOURS PRN
Status: DISCONTINUED | OUTPATIENT
Start: 2020-08-05 | End: 2020-08-09 | Stop reason: HOSPADM

## 2020-08-05 RX ORDER — ONDANSETRON 2 MG/ML
4 INJECTION INTRAMUSCULAR; INTRAVENOUS EVERY 6 HOURS PRN
Status: DISCONTINUED | OUTPATIENT
Start: 2020-08-05 | End: 2020-08-09 | Stop reason: HOSPADM

## 2020-08-05 RX ORDER — MULTIVIT-MIN/FERROUS GLUCONATE 9 MG/15 ML
15 LIQUID (ML) ORAL DAILY
Status: DISCONTINUED | OUTPATIENT
Start: 2020-08-06 | End: 2020-08-09 | Stop reason: HOSPADM

## 2020-08-05 RX ORDER — ZOLPIDEM TARTRATE 5 MG/1
5 TABLET ORAL NIGHTLY PRN
Status: DISCONTINUED | OUTPATIENT
Start: 2020-08-05 | End: 2020-08-09 | Stop reason: HOSPADM

## 2020-08-05 RX ORDER — PROMETHAZINE HYDROCHLORIDE 25 MG/1
12.5 TABLET ORAL EVERY 6 HOURS PRN
Status: DISCONTINUED | OUTPATIENT
Start: 2020-08-05 | End: 2020-08-06

## 2020-08-05 RX ORDER — MAGNESIUM SULFATE IN WATER 40 MG/ML
2 INJECTION, SOLUTION INTRAVENOUS PRN
Status: DISCONTINUED | OUTPATIENT
Start: 2020-08-05 | End: 2020-08-09 | Stop reason: HOSPADM

## 2020-08-05 RX ORDER — SODIUM CHLORIDE 0.9 % (FLUSH) 0.9 %
10 SYRINGE (ML) INJECTION EVERY 12 HOURS SCHEDULED
Status: DISCONTINUED | OUTPATIENT
Start: 2020-08-05 | End: 2020-08-09 | Stop reason: HOSPADM

## 2020-08-05 RX ORDER — GABAPENTIN 400 MG/1
800 CAPSULE ORAL 3 TIMES DAILY
Status: DISCONTINUED | OUTPATIENT
Start: 2020-08-05 | End: 2020-08-09 | Stop reason: HOSPADM

## 2020-08-05 RX ORDER — ATENOLOL 25 MG/1
25 TABLET ORAL DAILY
Status: DISCONTINUED | OUTPATIENT
Start: 2020-08-05 | End: 2020-08-09 | Stop reason: HOSPADM

## 2020-08-05 RX ORDER — LEVOTHYROXINE SODIUM 0.12 MG/1
125 TABLET ORAL DAILY
Status: DISCONTINUED | OUTPATIENT
Start: 2020-08-06 | End: 2020-08-09 | Stop reason: HOSPADM

## 2020-08-05 RX ORDER — LORAZEPAM 2 MG/ML
4 INJECTION INTRAMUSCULAR ONCE
Status: DISCONTINUED | OUTPATIENT
Start: 2020-08-05 | End: 2020-08-05 | Stop reason: DRUGHIGH

## 2020-08-05 RX ORDER — ONDANSETRON 4 MG/1
4 TABLET, ORALLY DISINTEGRATING ORAL EVERY 8 HOURS PRN
Status: DISCONTINUED | OUTPATIENT
Start: 2020-08-05 | End: 2020-08-09 | Stop reason: HOSPADM

## 2020-08-05 RX ORDER — FAMOTIDINE 20 MG/1
20 TABLET, FILM COATED ORAL 2 TIMES DAILY
Status: DISCONTINUED | OUTPATIENT
Start: 2020-08-05 | End: 2020-08-06

## 2020-08-05 RX ORDER — BENZONATATE 100 MG/1
200 CAPSULE ORAL 3 TIMES DAILY PRN
Status: DISCONTINUED | OUTPATIENT
Start: 2020-08-05 | End: 2020-08-09 | Stop reason: HOSPADM

## 2020-08-05 RX ORDER — SODIUM CHLORIDE 0.9 % (FLUSH) 0.9 %
10 SYRINGE (ML) INJECTION PRN
Status: DISCONTINUED | OUTPATIENT
Start: 2020-08-05 | End: 2020-08-09 | Stop reason: HOSPADM

## 2020-08-05 RX ORDER — OXYCODONE HYDROCHLORIDE AND ACETAMINOPHEN 5; 325 MG/1; MG/1
1 TABLET ORAL EVERY 8 HOURS PRN
Status: DISCONTINUED | OUTPATIENT
Start: 2020-08-05 | End: 2020-08-09 | Stop reason: HOSPADM

## 2020-08-05 RX ORDER — PANTOPRAZOLE SODIUM 40 MG/1
40 TABLET, DELAYED RELEASE ORAL
Status: DISCONTINUED | OUTPATIENT
Start: 2020-08-06 | End: 2020-08-06

## 2020-08-05 RX ORDER — ONDANSETRON 2 MG/ML
4 INJECTION INTRAMUSCULAR; INTRAVENOUS EVERY 6 HOURS PRN
Status: DISCONTINUED | OUTPATIENT
Start: 2020-08-05 | End: 2020-08-05 | Stop reason: SDUPTHER

## 2020-08-05 RX ORDER — ESTRADIOL 1 MG/1
0.5 TABLET ORAL DAILY
Status: DISCONTINUED | OUTPATIENT
Start: 2020-08-06 | End: 2020-08-09 | Stop reason: HOSPADM

## 2020-08-05 RX ORDER — LEVETIRACETAM 500 MG/1
1000 TABLET ORAL 2 TIMES DAILY
Status: DISCONTINUED | OUTPATIENT
Start: 2020-08-05 | End: 2020-08-05 | Stop reason: SDUPTHER

## 2020-08-05 RX ORDER — CALCIUM CARBONATE 200(500)MG
500 TABLET,CHEWABLE ORAL 2 TIMES DAILY
Status: DISCONTINUED | OUTPATIENT
Start: 2020-08-05 | End: 2020-08-09 | Stop reason: HOSPADM

## 2020-08-05 RX ORDER — TIZANIDINE 4 MG/1
4 TABLET ORAL EVERY 8 HOURS PRN
Status: DISCONTINUED | OUTPATIENT
Start: 2020-08-05 | End: 2020-08-06

## 2020-08-05 RX ORDER — SODIUM CHLORIDE 9 MG/ML
INJECTION, SOLUTION INTRAVENOUS CONTINUOUS
Status: DISCONTINUED | OUTPATIENT
Start: 2020-08-05 | End: 2020-08-09 | Stop reason: HOSPADM

## 2020-08-05 RX ORDER — NICOTINE 21 MG/24HR
1 PATCH, TRANSDERMAL 24 HOURS TRANSDERMAL DAILY
Status: DISCONTINUED | OUTPATIENT
Start: 2020-08-05 | End: 2020-08-09 | Stop reason: HOSPADM

## 2020-08-05 RX ORDER — OXYCODONE HYDROCHLORIDE AND ACETAMINOPHEN 5; 325 MG/1; MG/1
1 TABLET ORAL ONCE
Status: COMPLETED | OUTPATIENT
Start: 2020-08-05 | End: 2020-08-05

## 2020-08-05 RX ORDER — GUAIFENESIN 600 MG/1
1200 TABLET, EXTENDED RELEASE ORAL 2 TIMES DAILY
Status: DISCONTINUED | OUTPATIENT
Start: 2020-08-05 | End: 2020-08-09 | Stop reason: HOSPADM

## 2020-08-05 RX ORDER — LORAZEPAM 2 MG/ML
2 INJECTION INTRAMUSCULAR ONCE
Status: COMPLETED | OUTPATIENT
Start: 2020-08-05 | End: 2020-08-05

## 2020-08-05 RX ORDER — ONDANSETRON 2 MG/ML
8 INJECTION INTRAMUSCULAR; INTRAVENOUS EVERY 8 HOURS PRN
Status: DISCONTINUED | OUTPATIENT
Start: 2020-08-05 | End: 2020-08-09 | Stop reason: HOSPADM

## 2020-08-05 RX ORDER — LORAZEPAM 1 MG/1
1 TABLET ORAL EVERY 4 HOURS PRN
Status: DISCONTINUED | OUTPATIENT
Start: 2020-08-05 | End: 2020-08-09

## 2020-08-05 RX ORDER — LORAZEPAM 2 MG/ML
INJECTION INTRAMUSCULAR
Status: DISCONTINUED
Start: 2020-08-05 | End: 2020-08-05 | Stop reason: SDUPTHER

## 2020-08-05 RX ORDER — LORAZEPAM 2 MG/ML
INJECTION INTRAMUSCULAR
Status: COMPLETED
Start: 2020-08-05 | End: 2020-08-05

## 2020-08-05 RX ORDER — ACETAMINOPHEN 325 MG/1
650 TABLET ORAL EVERY 6 HOURS PRN
Status: DISCONTINUED | OUTPATIENT
Start: 2020-08-05 | End: 2020-08-09 | Stop reason: HOSPADM

## 2020-08-05 RX ADMIN — ONDANSETRON 8 MG: 2 INJECTION INTRAMUSCULAR; INTRAVENOUS at 22:57

## 2020-08-05 RX ADMIN — LEVETIRACETAM 1000 MG: 100 INJECTION, SOLUTION INTRAVENOUS at 14:32

## 2020-08-05 RX ADMIN — PAROXETINE 50 MG: 10 TABLET, FILM COATED ORAL at 21:04

## 2020-08-05 RX ADMIN — ONDANSETRON 4 MG: 2 INJECTION INTRAMUSCULAR; INTRAVENOUS at 14:20

## 2020-08-05 RX ADMIN — PROMETHAZINE HYDROCHLORIDE 12.5 MG: 25 TABLET ORAL at 21:04

## 2020-08-05 RX ADMIN — LORAZEPAM 2 MG: 2 INJECTION, SOLUTION INTRAMUSCULAR; INTRAVENOUS at 14:00

## 2020-08-05 RX ADMIN — GABAPENTIN 800 MG: 400 CAPSULE ORAL at 21:04

## 2020-08-05 RX ADMIN — LORAZEPAM 1 MG: 1 TABLET ORAL at 21:08

## 2020-08-05 RX ADMIN — ATENOLOL 25 MG: 25 TABLET ORAL at 18:26

## 2020-08-05 RX ADMIN — CALCIUM CARBONATE 500 MG: 500 TABLET, CHEWABLE ORAL at 21:04

## 2020-08-05 RX ADMIN — GUAIFENESIN 1200 MG: 600 TABLET, EXTENDED RELEASE ORAL at 21:05

## 2020-08-05 RX ADMIN — DICYCLOMINE HYDROCHLORIDE 10 MG: 10 CAPSULE ORAL at 18:34

## 2020-08-05 RX ADMIN — OXYCODONE HYDROCHLORIDE AND ACETAMINOPHEN 1 TABLET: 5; 325 TABLET ORAL at 23:23

## 2020-08-05 RX ADMIN — SODIUM CHLORIDE: 9 INJECTION, SOLUTION INTRAVENOUS at 18:26

## 2020-08-05 RX ADMIN — FAMOTIDINE 20 MG: 20 TABLET ORAL at 21:04

## 2020-08-05 RX ADMIN — LORAZEPAM 2 MG: 2 INJECTION INTRAMUSCULAR at 14:00

## 2020-08-05 RX ADMIN — ACETAMINOPHEN 650 MG: 325 TABLET ORAL at 21:03

## 2020-08-05 RX ADMIN — OXYCODONE HYDROCHLORIDE AND ACETAMINOPHEN 1 TABLET: 5; 325 TABLET ORAL at 18:34

## 2020-08-05 ASSESSMENT — PAIN SCALES - GENERAL
PAINLEVEL_OUTOF10: 6
PAINLEVEL_OUTOF10: 7
PAINLEVEL_OUTOF10: 6
PAINLEVEL_OUTOF10: 6
PAINLEVEL_OUTOF10: 3
PAINLEVEL_OUTOF10: 8
PAINLEVEL_OUTOF10: 8

## 2020-08-05 ASSESSMENT — PAIN DESCRIPTION - LOCATION: LOCATION: ABDOMEN

## 2020-08-05 ASSESSMENT — PAIN DESCRIPTION - PAIN TYPE: TYPE: CHRONIC PAIN

## 2020-08-05 ASSESSMENT — PAIN DESCRIPTION - ORIENTATION: ORIENTATION: RIGHT;UPPER

## 2020-08-05 NOTE — ED NOTES
This nurse answered the call light for this patient. She has begun to vomit and this nurse holds there emesis bag to her mouth. The patient is thrashing in the bed and wets herself. She is not responsive to sternal rub. She is breathing and is on the cardiac monitor. This nurse calls for help. 02 is placed on the patient's face per nasal cannula.       Debbi Obrien RN  08/05/20 1818

## 2020-08-05 NOTE — ACP (ADVANCE CARE PLANNING)
Advance Care Planning     Advance Care Planning Activator (Inpatient)  Conversation Note      Date of ACP Conversation: 8/5/2020 AND updates in red from 9/24/20. Conversation Conducted with: Patient with Decision Making Capacity    ACP Activator: Mandie Clement    *When Romaine Pruitt makes decisions on behalf of the incapacitated patient: Decision Maker is asked to consider and make decisions based on patient values, known preferences, or best interests. Health Care Decision Maker:     Current Designated Health Care Decision Maker:   Pedro Luis Evangelista- Daughter- primary decision maker [phone]- Now POA-   Liliane Lynch- Son secondary decision maker- [phone]- Now 1st alternate agent named in [place]. Key Frost- mother [phone] - supplemental decision maker. (If there is a valid [address] named in the [address] [place] [address] Makers\" box in the ACP activity, but it is not visible above, be sure to open that field and then select the health care decision maker relationship (ie \"primary\") in the blank space to the right of the name.) Validate  this information as still accurate & up-to-date; edit [address] field as needed.)    Note: Assess and validate information in current ACP documents, as indicated. Care Preferences    Ventilation: \"If you were in your present state of health and suddenly became very ill and were unable to breathe on your own, what would your preference be about the use of a ventilator (breathing machine) if it were available to you? \"      Would the patient desire the use of ventilator (breathing machine)?: yes    \"If your health worsens and it becomes clear that your chance of recovery is unlikely, what would your preference be about the use of a ventilator (breathing machine) if it were available to you? \"     Would the patient desire the use of ventilator (breathing machine)?: No      Resuscitation  \"CPR works best to restart the heart when there is a sudden event, like a heart attack, in someone who is otherwise healthy. Unfortunately, CPR does not typically restart the heart for people who have serious health conditions or who are very sick. \"    \"In the event your heart stopped as a result of an underlying serious health condition, would you want attempts to be made to restart your heart (answer \"yes\" for attempt to resuscitate) or would you prefer a natural death (answer \"no\" for do not attempt to resuscitate)? \" yes      NOTE: If the patient has a valid advance directive AND now provides care preference(s) that are inconsistent with that prior directive, advise the patient to consider either: creating a new advance directive that complies with state-specific requirements; or, if that is not possible, orally revoking that prior directive in accordance with state-specific requirements, which must be documented in the EHR. [] Yes   [] No   Educated Patient / Liliane Jean regarding differences between Advance Directives and portable DNR orders.     Length of ACP Conversation in minutes:  9  Length of ACP Conversation on 9/24 in minutes:  16    Conversation Outcomes:  [x] ACP discussion completed  [] Existing advance directive reviewed with patient; no changes to patient's previously recorded wishes  [x] New Advance Directive completed  [] Portable Do Not Rescitate prepared for Provider review and signature  [] POLST/POST/MOLST/MOST prepared for Provider review and signature      Follow-up plan:    [] Schedule follow-up conversation to continue planning  [] Referred individual to Provider for additional questions/concerns   [] Advised patient/agent/surrogate to review completed ACP document and update if needed with changes in condition, patient preferences or care setting    [x] This note routed to one or more involved healthcare providers      LSW did talk to pt about a POA and LW and explained either of these could be completed here in hospital at this time w/no cost. Pt did not prefer to have either of these completed @ this time. Pt chose her daughter as her Primary decision maker LSW explained since she has 2 children, [place] law specifies the primary decision maker in the following descending order for priority:    Guardian  Spouse  [de-identified] of adult Children  Parents  [de-identified] of adult Siblings  Nearest Relative not described above    LSW explained since she has multiple children, again there needs to be a majority of consensus for decision to be made and pt voiced understanding. 9/24  LSW spoke to pt about completing POA since she has 2 children and pt choose to do so today. Pt also chose to have a LW completed.

## 2020-08-05 NOTE — ED TRIAGE NOTES
Pt to ED with complaints of right upper abdominal pain and possible seizure last night. Reports hx of seizures.

## 2020-08-05 NOTE — ED PROVIDER NOTES
EMERGENCY DEPARTMENT ENCOUNTER      PCP: Shanel Hurt MD    CHIEF COMPLAINT    Chief Complaint   Patient presents with    Seizures    Abdominal Pain       Of note, this patient was also evaluated by the attending physician, . Glen Andrew is a 44 y.o. female who presents with abdominal pain for the past week which she feels like may be a return of her chronic pancreatitis. She has had hysterectomy, cholecystectomy and appendectomy in the past.  She is seen regularly for chronic abdominal pain. She also says that she has a history of seizures and she thinks she may have had one last night because she woke up and she vomited and since then she has been dizzy and feeling unstable. She has vomited 4 times today nonbloody. She also has some shortness of breath. She denies any other neurological complaints, no chest pain, no fevers or diarrhea. She has been taking her normal Percocet but it is not helping with her abdominal pain and the Zofran/phenergan is not helping with the nausea. REVIEW OF SYSTEMS    Constitutional:  Denies fever,+ chills, no weight loss or weakness   HENT:  Denies sore throat or ear pain   Cardiovascular:  Denies chest pain, palpitations   Respiratory:  Denies cough +shortness of breath    GI: See HPI  :  Denies any urinary symptoms or vaginal symptoms.    Musculoskeletal:  Denies back pain  Skin:  Denies rash  Neurologic:  + Dizziness, denies headache, focal weakness or sensory changes   Endocrine:  Denies polyuria or polydypsia   Lymphatic:  Denies swollen glands     All other review of systems are negative  See HPI and nursing notes for additional information     PAST MEDICAL AND SURGICAL HISTORY    Past Medical History:   Diagnosis Date    Acid reflux     Anemia     Anxiety     Arthritis     Hands, Back And Ankles    Bleeding ulcer 2014    \"I Had Ulcers In My Stomach And Colon\"/ per pt on 8/12/2019\"they said recently having some blood in my stomach- in July ( 2019)could not find where coming from \"    Bronchitis Last Episode 2014    Chronic back pain     Chronic pain     Sees Dr. Anne-Marie Blank COPD (chronic obstructive pulmonary disease) ([facility] [address]     Sees Dr. Justine Braxton Depression     Fibromyalgia Dx 2013    H/O echocardiogram 8/11/15    EF >55%. LA to be at the upper limit of normal in size. LV hypertrophy with normal LV systolic, but abnormal diastolic function. Normal valvular structures and function.  H/O echocardiogram 08/30/2018    EF 55-60%    Hiatal hernia     History of blood transfusion 09/2015 And 2018    No Reaction To Blood Transfusions Received    [place] (hard of hearing)     Right Ear    Hx of cardiac catheterization 10/24/2010    Angiographically normal coronary arteries w/ normal LV function and wall motion.  Hx of transesophageal echocardiography (PILO) for monitoring 12/2/2010    EF 55-60%. WNL.    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 name    Kidney cysts     \"they found that I have a kidney cyst but not sure which side\" per pt on 7/15/2020    Lupus ([facility] [address] Dx 2013    \"Rheumatoid Lupus\"    MDRO (multiple drug resistant organisms) resistance     4 months ago chest from mediport    Morbid obesity ([facility] [address]     MRSA bacteremia     Nausea     \"Most Of The Time\"    Pain management     Sees Dr. Alexia Yi, Once A Month    Pancreatitis chronic Dx 2001    Pneumonia Dx 11-15    Shortness of breath on exertion     Shortness of breath on exertion     Sleep apnea     Has CPAP\"no longer use the cpap since lost weight\"    Staph infection Dx 11-15    Left Foot    Staph infection Dx 11-15    \"Left Foot\"    Teeth missing Upper And Lower    Thyroid disease     Wears glasses     To Read     Past Surgical History:   Procedure Laterality Date    APPENDECTOMY  02/1998    Done When Tubes And Ovaries Were Removed    CARDIAC CATHETERIZATION  10/24/2010    CATHETER REMOVAL N/A 7/16/2019    PORT REMOVAL performed by Thomas Mason MD at [address] [facility]  08/27/1991    CHOLECYSTECTOMY, LAPAROSCOPIC  1990's    COLONOSCOPY  Last Done In 2000's    DENTAL SURGERY      Teeth Extracted In Past    ENDOSCOPY, COLON, DIAGNOSTIC  Last Done In 2018    FOOT DEBRIDEMENT Left 6/16/2019    FIRST METATARSAL DEBRIDEMENT INCISION AND DRAINAGE. EXCISION OF ULCER.  RESECTION OF BONE. 1ST METATARSAL POWER LAVAGE AND BONE CEMENT performed by Antonieta Washington DPM at [address] Left Last Done In 2016     Dr. Aidan Boyd, \" About 6 Surgeries Done Because Of Staph Infection\"    HIATAL HERNIA REPAIR N/A 2/12/2019    HERNIA HIATAL LAPAROSCOPIC ROBOTIC performed by Thomas Mason MD at [facility]  10/1997    Partial Abdominal Hysterectomy    INSERTION / REMOVAL / REPLACEMENT VENOUS ACCESS CATHETER N/A 8/15/2019    PORT INSERTION performed by Thomas Mason MD at [address] [facility]    removed after 6 months    LUNG REMOVAL, PARTIAL Left 2008    Benign    OTHER SURGICAL HISTORY  02/1998    \"Tubes And Ovaries Removed, Appendectomy Also Done\"    OTHER SURGICAL HISTORY  Last Done 7-15-16    Mediport Insertion \"Total Of Six Done, Removed Last Mediport In 2014\"    PORT SURGERY N/A 1/15/2020    PORT REMOVAL performed by Thomas Mason MD at [address] N/A 7/6/2020    PORT INSERTION performed by Thomas Mason MD at [address] N/A 2/12/2019    GASTRECTOMY SLEEVE LAPAROSCOPIC ROBOTIC performed by Thomas Mason MD at [address] [facility]  08/27/2018    UPPER GASTROINTESTINAL ENDOSCOPY N/A 4/2/2019 EGD CONTROL HEMORRHAGE with epi injection at bleeding site performed by Romina Echevarria MD at [address] [place] [address] 6/19/2019    EGD DIAGNOSTIC ONLY performed by Romina Echevarria MD at [address] [place] [address] 7/22/2019    EGD DIAGNOSTIC ONLY performed by Tamara Ward MD at [address] [facility] N/A 10/14/2019    EGD DIAGNOSTIC ONLY performed by Romina Echeavrria MD at [address] [place] [address],Third Floor N/A 7/17/2020    EGJ DILATATION BALLOON WITH 18-20 MM BALLOON, DILATED TO 20 MM. performed by Romina Echevarria MD at [address] [place] [address]    Current Outpatient Rx   Medication Sig Dispense Refill    ondansetron (ZOFRAN ODT) 4 MG disintegrating tablet Take 1 tablet by mouth every 8 hours as needed for Nausea or Vomiting 30 tablet 5    levETIRAcetam (KEPPRA) 1000 MG tablet Take 1 tablet by mouth 2 times daily 60 tablet 5    LORazepam (ATIVAN) 1 MG tablet Take 1 mg by mouth as needed for Anxiety.  pantoprazole (PROTONIX) 40 MG tablet Take 1 tablet by mouth every morning (before breakfast) 90 tablet 1    dicyclomine (BENTYL) 10 MG capsule Take 1 capsule by mouth 3 times daily as needed (abdominal pain) 21 capsule 3    oxyCODONE-acetaminophen (PERCOCET) 5-325 MG per tablet Take 1 tablet by mouth.  Every 4-6 hours PRN      Cholecalciferol (VITAMIN D3) 5000 units TABS Take 1 tablet by mouth daily      estradiol (ESTRACE) 0.5 MG tablet Take 0.5 mg by mouth daily      atenolol (TENORMIN) 25 MG tablet Take 25 mg by mouth daily      Multiple Vitamin (MVI, BARIATRIC ADVANTAGE MULTI-FORMULA, CHEW TAB) Take 1 tablet by mouth daily 30 tablet 5    Calcium Citrate-Vitamin D (CALCIUM + VIT D, BARIATRIC ADVANTAGE, CHEWABLE TABLET) Take 1 tablet by mouth 2 times daily 120 tablet 5    levothyroxine (SYNTHROID) 125 MCG tablet Take 1 tablet by mouth Daily (Patient taking differently: Take 125 mcg by mouth nightly ) 30 tablet 0    gabapentin (NEURONTIN) 800 MG tablet Take 800 mg by mouth 3 times daily      PARoxetine (PAXIL) 30 MG tablet Take 50 mg by mouth nightly          ALLERGIES    Allergies   Allergen Reactions    Aspirin Palpitations     \"My Heart Rate Elevates\"    Shellfish-Derived Products Swelling    Toradol [Ketorolac Tromethamine] Rash    Compazine [Prochlorperazine]     Reglan [Metoclopramide] Itching       SOCIAL AND FAMILY HISTORY    Social History     Socioeconomic History    Marital status:      Spouse name: None    Number of children: None    Years of education: None    Highest education level: None   Occupational History    None   Social Needs    Financial resource strain: None    Food insecurity     Worry: None     Inability: None    Transportation needs     Medical: None     Non-medical: None   Tobacco Use    Smoking status: Never Smoker    Smokeless tobacco: Never Used   Substance and Sexual Activity    Alcohol use: No     Alcohol/week: 0.0 standard drinks    Drug use: No    Sexual activity: Not Currently   Lifestyle    Physical activity     Days per week: None     Minutes per session: None    Stress: None   Relationships    Social connections     Talks on phone: None     Gets together: None     Attends Anabaptist service: None     Active member of club or organization: None     Attends meetings of clubs or organizations: None     Relationship status: None    Intimate partner violence     Fear of current or ex partner: None     Emotionally abused: None     Physically abused: None     Forced sexual activity: None   Other Topics Concern    None   Social History Narrative    None     Family History   Problem Relation Age of Onset    Diabetes Mother         \"Borderline Diabetes\"    High Blood Pressure Mother     Obesity Mother     Arthritis Mother     Heart Disease Mother     High Cholesterol Mother     Vision Loss Mother     Diabetes Father    Sheela Martinezelsen High Blood Pressure Father     Asthma Father     Cancer Father         prostate cancer    High Blood Pressure Brother     Asthma Son     Vision Loss Son     Lupus Daughter     Other Daughter         \"Alot Of Female Problems\"         PHYSICAL EXAM    VITAL SIGNS: BP (!) 177/72   Pulse 52   Temp 97.9 °F (36.6 °C) (Oral)   Resp 15   Ht 5' 4\" (1.626 m)   Wt 264 lb (119.7 kg)   SpO2 100%   BMI 45.32 kg/m²    Constitutional:  Well developed, obese, no acute distress  HENT:  Normocephalic, Atraumatic, PERRL. EOMI. Sclera clear. Conjunctiva normal, No discharge. Neck/Lymphatics: supple, no JVD, no swollen nodes  Cardiovascular:  Rate 50, regular rhythm,  no murmurs/rubs/gallops. Respiratory:  Nonlabored breathing. Normal breath sounds, No wheezing  Abdomen: Bowel sounds normal, Soft, RUQ tenderness, no masses. Musculoskeletal:    There is no edema, asymmetry, or calf / thigh tenderness bilaterally. No cyanosis. No cool or pale-appearing limb. Distal cap refill and pulses intact bilateral upper and lower extremities  Bilateral upper and lower extremity ROM intact without pain or obvious deficit  Integument:  Warm, Dry    Neurologic:    - Alert & oriented person, place, time, and situation, no speech difficulties or slurring.  - No obvious gross motor deficits  - Cranial nerves 2-12 grossly intact  - Negative meningeal signs.  - Sensation intact to light touch  - Strength 5/5 in upper and lower extremities bilaterally  - Normal finger to nose test bilaterally  - Rapid alternating movements intact  - Normal heel-shin bilaterally  - No pronator drift.   - Light touch sensation intact throughout.  - Gait steady and without difficulty  -No truncal ataxia  -Negative skew test bilateral eyes    NIHSS - 0      Psychiatric:  Affect normal, Mood normal.     Results for orders placed or performed during the hospital encounter of 08/05/20   CBC Auto Differential   Result Value Ref Range    WBC 4.3 4.0 - 10.5 K/CU MM    RBC 4.31 4.2 - 5.4 M/CU MM    Hemoglobin 11.3 (L) 12.5 - 16.0 GM/DL    Hematocrit 36.6 (L) 37 - 47 %    MCV 84.9 78 - 100 FL    MCH 26.2 (L) 27 - 31 PG    MCHC 30.9 (L) 32.0 - 36.0 %    RDW 13.6 11.7 - 14.9 %    Platelets 582 709 - 057 K/CU MM    MPV 10.0 7.5 - 11.1 FL    Differential Type AUTOMATED DIFFERENTIAL     Segs Relative 44.1 36 - 66 %    Lymphocytes % 42.7 24 - 44 %    Monocytes % 6.7 (H) 0 - 4 %    Eosinophils % 5.3 (H) 0 - 3 %    Basophils % 0.7 0 - 1 %    Segs Absolute 1.9 K/CU MM    Lymphocytes Absolute 1.9 K/CU MM    Monocytes Absolute 0.3 K/CU MM    Eosinophils Absolute 0.2 K/CU MM    Basophils Absolute 0.0 K/CU MM    Nucleated RBC % 0.0 %    Total Nucleated RBC 0.0 K/CU MM    Total Immature Neutrophil 0.02 K/CU MM    Immature Neutrophil % 0.5 (H) 0 - 0.43 %   Comprehensive Metabolic Panel   Result Value Ref Range    Sodium 141 135 - 145 MMOL/L    Potassium 4.3 3.5 - 5.1 MMOL/L    Chloride 106 99 - 110 mMol/L    CO2 26 21 - 32 MMOL/L    BUN 21 6 - 23 MG/DL    CREATININE 0.7 0.6 - 1.1 MG/DL    Glucose 93 70 - 99 MG/DL    Calcium 10.3 8.3 - 10.6 MG/DL    Alb 4.2 3.4 - 5.0 GM/DL    Total Protein 6.8 6.4 - 8.2 GM/DL    Total Bilirubin 0.2 0.0 - 1.0 MG/DL    ALT 19 10 - 40 U/L    AST 17 15 - 37 IU/L    Alkaline Phosphatase 111 40 - 129 IU/L    GFR Non-African American >60 >60 mL/min/1.73m2    GFR African American >60 >60 mL/min/1.73m2    Anion Gap 9 4 - 16   Troponin   Result Value Ref Range    Troponin T <0.010 <0.01 NG/ML   Brain Natriuretic Peptide   Result Value Ref Range    Pro-.8 <300 PG/ML   Urinalysis   Result Value Ref Range    Color, UA YELLOW YELLOW    Clarity, UA CLEAR CLEAR    Glucose, Urine NEGATIVE NEGATIVE MG/DL    Bilirubin Urine NEGATIVE NEGATIVE MG/DL    Ketones, Urine NEGATIVE NEGATIVE MG/DL    Specific Gravity, UA 1.026 1.001 - 1.035    Blood, Urine NEGATIVE NEGATIVE    pH, Urine 5.0 5.0 - 8.0    Protein, UA NEGATIVE NEGATIVE MG/DL    Urobilinogen, Urine NORMAL 0.2 - 1.0 MG/DL    Nitrite Urine, Quantitative NEGATIVE NEGATIVE    Leukocyte Esterase, Urine NEGATIVE NEGATIVE    RBC, UA <1 0 - 6 /HPF    WBC, UA 1 0 - 5 /HPF    Bacteria, UA RARE (A) NEGATIVE /HPF    Squam Epithel, UA 3 /HPF    Trans Epithel, UA <1 /HPF    Mucus, UA RARE (A) NEGATIVE HPF    Trichomonas, UA NONE SEEN NONE SEEN /HPF   Lipase   Result Value Ref Range    Lipase 21 13 - 60 IU/L   EKG 12 Lead   Result Value Ref Range    Ventricular Rate 52 BPM    Atrial Rate 52 BPM    P-R Interval 200 ms    QRS Duration 94 ms    Q-T Interval 428 ms    QTc Calculation (Bazett) 398 ms    P Axis 44 degrees    R Axis -17 degrees    T Axis 13 degrees    Diagnosis       Sinus bradycardia  Incomplete right bundle branch block  Borderline ECG  When compared with ECG of 20-JUL-2020 22:47,  Inverted T waves have replaced nonspecific T wave abnormality in Inferior leads          XR CHEST PORTABLE   Final Result   Cardiomegaly with mild congestive changes. ED COURSE & MEDICAL DECISION MAKING       Patient presents as above for abdominal pain and concern about seizures. On exam she has some right upper quadrant belly tenderness, mild. Otherwise reassuring physical exam.  Neuro exam is normal and NIHSS stroke scale is 0. Vital signs are stable, slightly bradycardic, otherwise normal.    Routine labs drawn, they reveal no leukocytosis, normal chemistry, lipase troponin and BNP all within normal limits. Urinalysis non-concerning for infection. At approximately 2:00 the nurse alerted me that the patient was seizing, I went into the room of the attending and the patient had vomited and wet herself and she was actively seizing. She was given Ativan. On recheck she is conversant but appears postictal, complaining of nausea and a mild headache. 1 g of Keppra given. Chest x-ray shows cardiomegaly with mild congestive changes, EKG shows sinus bradycardia with incomplete right bundle branch block.   See interpretation of the

## 2020-08-05 NOTE — H&P
HISTORY AND PHYSICAL    2020     Patient Information:    Patient: Ino Zapien     Gender: female  : 1970   Age: 44 y.o. MRN: [id number]        PCP:  Teressa Gonzales MD (Tel: [phone] )    Chief complaint:    Chief Complaint   Patient presents with    Seizures    Abdominal Pain          History of Present Illness:  Andre Pope is a 44 y.o. 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  Pt presented to ER for evaluation for seizure episode at home and she had another episode and it was witnesed by ER doctor . Also pt has  worsening abdominal pain and nausea and vomiting . In ER lab data non specific . History obtained from patient .           REVIEW OF SYSTEMS:   Constitutional: Negative for fever,chills . ENT: Negative for rhinorrhea,  sore throat. Respiratory: Negative for cough, shortness of breath,wheezing  Cardiovascular: Negative for chest pain, palpitations   Gastrointestinal: + for Abdominal pain, nausea, vomiting, diarrhea  Genitourinary: Negative for polyuria, dysuria   Hematologic/Lymphatic: Negative for bleeding tendency, easy bruising  Musculoskeletal: Negative for myalgias and arthralgias  Neurologic: Negative for confusion,dysarthria. Skin: Negative for itching,rash  Psychiatric: Negative for depression,anxiety, agitation. Endocrine: Negative for polydipsia,polyuria,heat /cold intolerance.     Past Medical History:   has a past medical history of Acid reflux, Anemia, Anxiety, Arthritis, Bleeding ulcer, Bronchitis, Chronic back pain, Chronic pain, COPD (chronic obstructive pulmonary disease) ([facility] [address] Depression, Fibromyalgia, H/O echocardiogram, H/O echocardiogram, Hiatal hernia, History of blood transfusion, [place] (hard of hearing), Hx of cardiac catheterization, Hx of transesophageal echocardiography (PILO) for monitoring, HX OTHER MEDICAL, HX OTHER MEDICAL, Hypertension, Kidney cysts, Lupus ([facility] [address] MDRO (multiple drug resistant organisms) resistance, Morbid obesity ([facility] [address] MRSA bacteremia, Nausea, Pain management, Pancreatitis chronic, Pneumonia, Shortness of breath on exertion, Shortness of breath on exertion, Sleep apnea, Staph infection, Staph infection, Teeth missing, Thyroid disease, and Wears glasses. Past Surgical History:   has a past surgical history that includes Lung removal, partial (Left, );  section (1991); Foot surgery (Left, Last Done In ); Dental surgery; Cholecystectomy, laparoscopic (8381'X); other surgical history (1998); other surgical history (Last Done 7-15-16); Tonsillectomy and adenoidectomy (); Appendectomy (1998); Upper gastrointestinal endoscopy (2018); Lap Band (~); Hysterectomy (10/1997); Endoscopy, colon, diagnostic (Last Done In ); Colonoscopy (Last Done In ); Cardiac catheterization (10/24/2010); Sleeve Gastrectomy (N/A, 2019); hiatal hernia repair (N/A, 2019); Upper gastrointestinal endoscopy (N/A, 2019); Foot Debridement (Left, 2019); Upper gastrointestinal endoscopy (N/A, 2019); Catheter Removal (N/A, 2019); Upper gastrointestinal endoscopy (N/A, 2019); INSERTION / REMOVAL / REPLACEMENT VENOUS ACCESS CATHETER (N/A, 8/15/2019); Upper gastrointestinal endoscopy (N/A, 10/14/2019); [address] Surgery (N/A, 1/15/2020); Port Surgery (N/A, 2020); and Upper gastrointestinal endoscopy (N/A, 2020). Medications:  No current facility-administered medications on file prior to encounter.       Current Outpatient Medications on File Prior to Encounter Medication Sig Dispense Refill    ondansetron (ZOFRAN ODT) 4 MG disintegrating tablet Take 1 tablet by mouth every 8 hours as needed for Nausea or Vomiting 30 tablet 5    levETIRAcetam (KEPPRA) 1000 MG tablet Take 1 tablet by mouth 2 times daily 60 tablet 5    LORazepam (ATIVAN) 1 MG tablet Take 1 mg by mouth as needed for Anxiety.  pantoprazole (PROTONIX) 40 MG tablet Take 1 tablet by mouth every morning (before breakfast) 90 tablet 1    dicyclomine (BENTYL) 10 MG capsule Take 1 capsule by mouth 3 times daily as needed (abdominal pain) 21 capsule 3    oxyCODONE-acetaminophen (PERCOCET) 5-325 MG per tablet Take 1 tablet by mouth. Every 4-6 hours PRN      Cholecalciferol (VITAMIN D3) 5000 units TABS Take 1 tablet by mouth daily      estradiol (ESTRACE) 0.5 MG tablet Take 0.5 mg by mouth daily      atenolol (TENORMIN) 25 MG tablet Take 25 mg by mouth daily      Multiple Vitamin (MVI, BARIATRIC ADVANTAGE MULTI-FORMULA, CHEW TAB) Take 1 tablet by mouth daily 30 tablet 5    Calcium Citrate-Vitamin D (CALCIUM + VIT D, BARIATRIC ADVANTAGE, CHEWABLE TABLET) Take 1 tablet by mouth 2 times daily 120 tablet 5    levothyroxine (SYNTHROID) 125 MCG tablet Take 1 tablet by mouth Daily (Patient taking differently: Take 125 mcg by mouth nightly ) 30 tablet 0    gabapentin (NEURONTIN) 800 MG tablet Take 800 mg by mouth 3 times daily      PARoxetine (PAXIL) 30 MG tablet Take 50 mg by mouth nightly          Allergies: Allergies   Allergen Reactions    Aspirin Palpitations     \"My Heart Rate Elevates\"    Shellfish-Derived Products Swelling    Toradol [Ketorolac Tromethamine] Rash    Compazine [Prochlorperazine]     Reglan [Metoclopramide] Itching        Social History:   reports that she has never smoked. She has never used smokeless tobacco. She reports that she does not drink alcohol or use drugs. Family History:  family history includes Arthritis in her mother; Asthma in her father and son;  Cancer in her father; Diabetes in her father and mother; Heart Disease in her mother; High Blood Pressure in her brother, father, and mother; High Cholesterol in her mother; Lupus in her daughter; Obesity in her mother; Other in her daughter; Vision Loss in her mother and son. ,     Physical Exam:  /72   Pulse 53   Temp 97.9 °F (36.6 °C) (Oral)   Resp 16   Ht 5' 4\" (1.626 m)   Wt 264 lb (119.7 kg)   SpO2 99%   BMI 45.32 kg/m²     General appearance:  no distress . Well nourished  Eyes: Sclera clear, pupils equal  Cardiovascular: Regular rhythm, normal S1, S2. No edema in lower extremities  Respiratory: Clear to auscultation bilaterally, no wheeze, good inspiratory effort  Gastrointestinal: Abdomen soft, non-tender, not distended, normal bowel sounds  Musculoskeletal: No cyanosis in digits, neck supple  Neurology: Cranial nerves grossly intact. Alert and oriented . No speech or motor deficits  Psychiatry: Appropriate affect.  Not agitated  Skin: Warm, dry, normal turgor, no rash    Labs:  CBC:   Lab Results   Component Value Date    WBC 4.3 08/05/2020    RBC 4.31 08/05/2020    HGB 11.3 08/05/2020    HCT 36.6 08/05/2020    MCV 84.9 08/05/2020    MCH 26.2 08/05/2020    MCHC 30.9 08/05/2020    RDW 13.6 08/05/2020     08/05/2020    MPV 10.0 08/05/2020     BMP:    Lab Results   Component Value Date     08/05/2020    K 4.3 08/05/2020     08/05/2020    CO2 26 08/05/2020    BUN 21 08/05/2020    CREATININE 0.7 08/05/2020    CALCIUM 10.3 08/05/2020    GFRAA >60 08/05/2020    LABGLOM >60 08/05/2020    GLUCOSE 93 08/05/2020       Chest Xray:   EKG:    I visualized CXR images and EKG strips      Patient Active Problem List   Diagnosis Code    Fibromyalgia M79.7    Lupus (systemic lupus erythematosus) (HCC) M32.9    Chronic pancreatitis (HCC) K86.1    HTN (hypertension) I10    Generalized abdominal pain R10.84    Frequent UTI N39.0    Gastroesophageal reflux disease without esophagitis K21.9   

## 2020-08-05 NOTE — ED PROVIDER NOTES
I performed a medical screening history and physical exam on this patient. I also cared for and evaluated the patient in conjunction with the ED Advanced Practice Provider    HISTORY OF Mima Stewart is a 44 y.o. female intrapatient presents with abdominal pain which she states is a chronic issue. It is in her epigastric area and does not radiate. She has been nauseous and has vomited occasionally nonbloody nonbilious emesis. She did have one episode of diarrhea. Physical exam does show mild tenderness in the epigastric area without signs of an acute abdomen. PHYSICAL EXAM  BP (!) 124/97   Pulse 52   Temp 97.9 °F (36.6 °C) (Oral)   Resp 16   Ht 5' 4\" (1.626 m)   Wt 264 lb (119.7 kg)   SpO2 96%   BMI 45.32 kg/m²     On exam, the patient appears in no acute distress. Speech is clear. Breathing is unlabored. Moves all extremities    After initial evaluation the patient had a seizure and I was asked to go in the room and we administered ativan which terminated here seizure. She was loaded with keppra and was frequently rechecked and monitored with no seizure reocurrance. All diagnostic, treatment, and disposition decisions were made by myself in conjunction with the advanced practice provider. Total critical care time provided today was 34 minutes. This excludes seperately billable procedures and family discussion time. Critical care time provided for obtaining history, conducting a physical exam, performing and monitoring interventions, ordering, collecting and interpreting tests, and establishing medical decision-making. There was a potential for life/limb threatening pathology requiring close evaluation and intervention with concern for patient decompensation. For all further details of the patient's emergency department visit, please see the advanced practice provider's documentation.     Comment: Please note this report has been produced using speech recognition software and may contain errors related to that system including errors in grammar, punctuation, and spelling, as well as words and phrases that may be inappropriate. If there are any questions or concerns please feel free to contact the dictating provider for clarification.      Argentina Myles, DO  08/05/20 230 [facility], DO  08/06/20 [id number]

## 2020-08-05 NOTE — CARE COORDINATION
Pt identified as potential readmission. Last admission 7/20 - 7/23 for Abdominal pain. Pt here today for seizure and abdominal pain, reporting she seized last night and woke up vomiting. Pt has also vomited 4 times today. EKG is abnormal noting Sinus bradycardia &  Incomplete right bundle branch block. Pt did vomit, wet herself and she was actively seizing while in ED- observed by RN and PA. Chest x-ray shows cardiomegaly with mild congestive changes. Pt is requiring readmission and there were no alternatives to admission to explore at this time. LSW spoke to this pt and LSW explained CM role. Pt reports she lives w/her mother in a 1 story home. Pt was quite tired and had difficulty keeping her eyes open. Pt notes she receives SSDI for her Lacy. LSW did complete ACP w/pt. Pt does have insurance and can afford her medications. Pt has transportation via self family friends. Pt does have a PCP. No needs anticipated.

## 2020-08-06 ENCOUNTER — APPOINTMENT (OUTPATIENT)
Dept: MRI IMAGING | Age: 50
End: 2020-08-06
Payer: COMMERCIAL

## 2020-08-06 LAB
ANION GAP SERPL CALCULATED.3IONS-SCNC: 8 MMOL/L (ref 4–16)
BASOPHILS ABSOLUTE: 0.1 K/CU MM
BASOPHILS RELATIVE PERCENT: 0.9 % (ref 0–1)
BUN BLDV-MCNC: 15 MG/DL (ref 6–23)
CALCIUM SERPL-MCNC: 10.1 MG/DL (ref 8.3–10.6)
CHLORIDE BLD-SCNC: 106 MMOL/L (ref 99–110)
CO2: 26 MMOL/L (ref 21–32)
CREAT SERPL-MCNC: 0.8 MG/DL (ref 0.6–1.1)
DIFFERENTIAL TYPE: ABNORMAL
EOSINOPHILS ABSOLUTE: 0.3 K/CU MM
EOSINOPHILS RELATIVE PERCENT: 5.2 % (ref 0–3)
GFR AFRICAN AMERICAN: >60 ML/MIN/1.73M2
GFR NON-AFRICAN AMERICAN: >60 ML/MIN/1.73M2
GLUCOSE BLD-MCNC: 79 MG/DL (ref 70–99)
HCT VFR BLD CALC: 37.1 % (ref 37–47)
HEMOGLOBIN: 11.6 GM/DL (ref 12.5–16)
IMMATURE NEUTROPHIL %: 0.2 % (ref 0–0.43)
LYMPHOCYTES ABSOLUTE: 2.3 K/CU MM
LYMPHOCYTES RELATIVE PERCENT: 40.4 % (ref 24–44)
MCH RBC QN AUTO: 26.7 PG (ref 27–31)
MCHC RBC AUTO-ENTMCNC: 31.3 % (ref 32–36)
MCV RBC AUTO: 85.3 FL (ref 78–100)
MONOCYTES ABSOLUTE: 0.4 K/CU MM
MONOCYTES RELATIVE PERCENT: 7.5 % (ref 0–4)
NUCLEATED RBC %: 0 %
PDW BLD-RTO: 13.7 % (ref 11.7–14.9)
PLATELET # BLD: 223 K/CU MM (ref 140–440)
PMV BLD AUTO: 9.9 FL (ref 7.5–11.1)
POTASSIUM SERPL-SCNC: 4.1 MMOL/L (ref 3.5–5.1)
RBC # BLD: 4.35 M/CU MM (ref 4.2–5.4)
SEGMENTED NEUTROPHILS ABSOLUTE COUNT: 2.6 K/CU MM
SEGMENTED NEUTROPHILS RELATIVE PERCENT: 45.8 % (ref 36–66)
SODIUM BLD-SCNC: 140 MMOL/L (ref 135–145)
TOTAL IMMATURE NEUTOROPHIL: 0.01 K/CU MM
TOTAL NUCLEATED RBC: 0 K/CU MM
WBC # BLD: 5.6 K/CU MM (ref 4–10.5)

## 2020-08-06 PROCEDURE — 6360000004 HC RX CONTRAST MEDICATION: Performed by: NURSE PRACTITIONER

## 2020-08-06 PROCEDURE — 6370000000 HC RX 637 (ALT 250 FOR IP): Performed by: FAMILY MEDICINE

## 2020-08-06 PROCEDURE — G0378 HOSPITAL OBSERVATION PER HR: HCPCS

## 2020-08-06 PROCEDURE — 2580000003 HC RX 258: Performed by: NURSE PRACTITIONER

## 2020-08-06 PROCEDURE — 85027 COMPLETE CBC AUTOMATED: CPT

## 2020-08-06 PROCEDURE — 6370000000 HC RX 637 (ALT 250 FOR IP): Performed by: NURSE PRACTITIONER

## 2020-08-06 PROCEDURE — 99215 OFFICE O/P EST HI 40 MIN: CPT | Performed by: NURSE PRACTITIONER

## 2020-08-06 PROCEDURE — 96376 TX/PRO/DX INJ SAME DRUG ADON: CPT

## 2020-08-06 PROCEDURE — 2580000003 HC RX 258: Performed by: PHYSICIAN ASSISTANT

## 2020-08-06 PROCEDURE — 96372 THER/PROPH/DIAG INJ SC/IM: CPT

## 2020-08-06 PROCEDURE — 70553 MRI BRAIN STEM W/O & W/DYE: CPT

## 2020-08-06 PROCEDURE — 6360000002 HC RX W HCPCS: Performed by: SPECIALIST

## 2020-08-06 PROCEDURE — 6360000002 HC RX W HCPCS: Performed by: FAMILY MEDICINE

## 2020-08-06 PROCEDURE — 6360000002 HC RX W HCPCS: Performed by: NURSE PRACTITIONER

## 2020-08-06 PROCEDURE — C9113 INJ PANTOPRAZOLE SODIUM, VIA: HCPCS | Performed by: SPECIALIST

## 2020-08-06 PROCEDURE — 6360000002 HC RX W HCPCS: Performed by: PHYSICIAN ASSISTANT

## 2020-08-06 PROCEDURE — 2580000003 HC RX 258: Performed by: FAMILY MEDICINE

## 2020-08-06 PROCEDURE — 80048 BASIC METABOLIC PNL TOTAL CA: CPT

## 2020-08-06 PROCEDURE — 85025 COMPLETE CBC W/AUTO DIFF WBC: CPT

## 2020-08-06 PROCEDURE — 94761 N-INVAS EAR/PLS OXIMETRY MLT: CPT

## 2020-08-06 PROCEDURE — A9577 INJ MULTIHANCE: HCPCS | Performed by: NURSE PRACTITIONER

## 2020-08-06 PROCEDURE — 96375 TX/PRO/DX INJ NEW DRUG ADDON: CPT

## 2020-08-06 PROCEDURE — 2500000003 HC RX 250 WO HCPCS: Performed by: NURSE PRACTITIONER

## 2020-08-06 RX ORDER — SODIUM CHLORIDE 0.9 % (FLUSH) 0.9 %
10 SYRINGE (ML) INJECTION
Status: COMPLETED | OUTPATIENT
Start: 2020-08-06 | End: 2020-08-06

## 2020-08-06 RX ORDER — PROMETHAZINE HYDROCHLORIDE 25 MG/ML
12.5 INJECTION, SOLUTION INTRAMUSCULAR; INTRAVENOUS EVERY 6 HOURS PRN
Status: DISCONTINUED | OUTPATIENT
Start: 2020-08-06 | End: 2020-08-09 | Stop reason: HOSPADM

## 2020-08-06 RX ORDER — DEXTROSE AND SODIUM CHLORIDE 5; .9 G/100ML; G/100ML
INJECTION, SOLUTION INTRAVENOUS CONTINUOUS
Status: DISCONTINUED | OUTPATIENT
Start: 2020-08-06 | End: 2020-08-06

## 2020-08-06 RX ORDER — DEXAMETHASONE SODIUM PHOSPHATE 4 MG/ML
10 INJECTION, SOLUTION INTRA-ARTICULAR; INTRALESIONAL; INTRAMUSCULAR; INTRAVENOUS; SOFT TISSUE ONCE
Status: COMPLETED | OUTPATIENT
Start: 2020-08-06 | End: 2020-08-06

## 2020-08-06 RX ORDER — DEXTROSE AND SODIUM CHLORIDE 5; .45 G/100ML; G/100ML
INJECTION, SOLUTION INTRAVENOUS CONTINUOUS
Status: DISCONTINUED | OUTPATIENT
Start: 2020-08-06 | End: 2020-08-09 | Stop reason: HOSPADM

## 2020-08-06 RX ORDER — LORAZEPAM 2 MG/ML
1 INJECTION INTRAMUSCULAR ONCE
Status: COMPLETED | OUTPATIENT
Start: 2020-08-06 | End: 2020-08-06

## 2020-08-06 RX ORDER — ORPHENADRINE CITRATE 30 MG/ML
60 INJECTION INTRAMUSCULAR; INTRAVENOUS ONCE
Status: COMPLETED | OUTPATIENT
Start: 2020-08-06 | End: 2020-08-06

## 2020-08-06 RX ORDER — PANTOPRAZOLE SODIUM 40 MG/10ML
40 INJECTION, POWDER, LYOPHILIZED, FOR SOLUTION INTRAVENOUS DAILY
Status: DISCONTINUED | OUTPATIENT
Start: 2020-08-06 | End: 2020-08-09 | Stop reason: HOSPADM

## 2020-08-06 RX ORDER — PROMETHAZINE HYDROCHLORIDE 25 MG/ML
12.5 INJECTION, SOLUTION INTRAMUSCULAR; INTRAVENOUS EVERY 6 HOURS PRN
Status: DISCONTINUED | OUTPATIENT
Start: 2020-08-06 | End: 2020-08-06

## 2020-08-06 RX ORDER — MORPHINE SULFATE 2 MG/ML
2 INJECTION, SOLUTION INTRAMUSCULAR; INTRAVENOUS EVERY 6 HOURS PRN
Status: DISCONTINUED | OUTPATIENT
Start: 2020-08-06 | End: 2020-08-09

## 2020-08-06 RX ADMIN — PROMETHAZINE HYDROCHLORIDE 12.5 MG: 25 INJECTION INTRAMUSCULAR; INTRAVENOUS at 18:26

## 2020-08-06 RX ADMIN — PROMETHAZINE HYDROCHLORIDE 12.5 MG: 25 INJECTION INTRAMUSCULAR; INTRAVENOUS at 03:42

## 2020-08-06 RX ADMIN — ATENOLOL 25 MG: 25 TABLET ORAL at 11:06

## 2020-08-06 RX ADMIN — PANTOPRAZOLE SODIUM 40 MG: 40 INJECTION, POWDER, FOR SOLUTION INTRAVENOUS at 11:06

## 2020-08-06 RX ADMIN — LEVETIRACETAM 1000 MG: 100 INJECTION, SOLUTION INTRAVENOUS at 15:19

## 2020-08-06 RX ADMIN — MORPHINE SULFATE 2 MG: 2 INJECTION, SOLUTION INTRAMUSCULAR; INTRAVENOUS at 10:52

## 2020-08-06 RX ADMIN — DEXTROSE AND SODIUM CHLORIDE: 5; 450 INJECTION, SOLUTION INTRAVENOUS at 03:42

## 2020-08-06 RX ADMIN — LORAZEPAM 1 MG: 2 INJECTION, SOLUTION INTRAMUSCULAR; INTRAVENOUS at 18:01

## 2020-08-06 RX ADMIN — PAROXETINE 50 MG: 10 TABLET, FILM COATED ORAL at 21:02

## 2020-08-06 RX ADMIN — CALCIUM CARBONATE 500 MG: 500 TABLET, CHEWABLE ORAL at 11:06

## 2020-08-06 RX ADMIN — ESTRADIOL 0.5 MG: 1 TABLET ORAL at 11:07

## 2020-08-06 RX ADMIN — TIZANIDINE 4 MG: 4 TABLET ORAL at 11:06

## 2020-08-06 RX ADMIN — ONDANSETRON 4 MG: 2 INJECTION INTRAMUSCULAR; INTRAVENOUS at 14:27

## 2020-08-06 RX ADMIN — VALPROATE SODIUM 500 MG: 100 INJECTION, SOLUTION INTRAVENOUS at 14:09

## 2020-08-06 RX ADMIN — MORPHINE SULFATE 2 MG: 2 INJECTION, SOLUTION INTRAMUSCULAR; INTRAVENOUS at 03:35

## 2020-08-06 RX ADMIN — ENOXAPARIN SODIUM 40 MG: 40 INJECTION SUBCUTANEOUS at 11:07

## 2020-08-06 RX ADMIN — GADOBENATE DIMEGLUMINE 14 ML: 529 INJECTION, SOLUTION INTRAVENOUS at 17:58

## 2020-08-06 RX ADMIN — GABAPENTIN 800 MG: 400 CAPSULE ORAL at 14:10

## 2020-08-06 RX ADMIN — LEVETIRACETAM 1000 MG: 100 INJECTION, SOLUTION INTRAVENOUS at 02:17

## 2020-08-06 RX ADMIN — GUAIFENESIN 1200 MG: 600 TABLET, EXTENDED RELEASE ORAL at 11:07

## 2020-08-06 RX ADMIN — PROMETHAZINE HYDROCHLORIDE 12.5 MG: 25 INJECTION INTRAMUSCULAR; INTRAVENOUS at 10:52

## 2020-08-06 RX ADMIN — ORPHENADRINE CITRATE 60 MG: 60 INJECTION INTRAMUSCULAR; INTRAVENOUS at 14:09

## 2020-08-06 RX ADMIN — LORAZEPAM 1 MG: 1 TABLET ORAL at 11:06

## 2020-08-06 RX ADMIN — CALCIUM CARBONATE 500 MG: 500 TABLET, CHEWABLE ORAL at 21:03

## 2020-08-06 RX ADMIN — VALPROATE SODIUM 500 MG: 100 INJECTION, SOLUTION INTRAVENOUS at 21:03

## 2020-08-06 RX ADMIN — SODIUM CHLORIDE, PRESERVATIVE FREE 10 ML: 5 INJECTION INTRAVENOUS at 17:58

## 2020-08-06 RX ADMIN — TIZANIDINE 6 MG: 2 TABLET ORAL at 21:02

## 2020-08-06 RX ADMIN — GABAPENTIN 800 MG: 400 CAPSULE ORAL at 21:02

## 2020-08-06 RX ADMIN — DEXTROSE AND SODIUM CHLORIDE: 5; 450 INJECTION, SOLUTION INTRAVENOUS at 14:08

## 2020-08-06 RX ADMIN — Medication 5000 UNITS: at 10:59

## 2020-08-06 RX ADMIN — MORPHINE SULFATE 2 MG: 2 INJECTION, SOLUTION INTRAMUSCULAR; INTRAVENOUS at 18:26

## 2020-08-06 RX ADMIN — DEXAMETHASONE SODIUM PHOSPHATE 10 MG: 4 INJECTION, SOLUTION INTRAMUSCULAR; INTRAVENOUS at 14:10

## 2020-08-06 RX ADMIN — GUAIFENESIN 1200 MG: 600 TABLET, EXTENDED RELEASE ORAL at 21:03

## 2020-08-06 ASSESSMENT — PAIN SCALES - GENERAL
PAINLEVEL_OUTOF10: 8
PAINLEVEL_OUTOF10: 9
PAINLEVEL_OUTOF10: 0
PAINLEVEL_OUTOF10: 8

## 2020-08-06 ASSESSMENT — PAIN DESCRIPTION - LOCATION: LOCATION: ABDOMEN

## 2020-08-06 ASSESSMENT — PAIN DESCRIPTION - PROGRESSION: CLINICAL_PROGRESSION: GRADUALLY WORSENING

## 2020-08-06 ASSESSMENT — PAIN DESCRIPTION - PAIN TYPE: TYPE: CHRONIC PAIN;ACUTE PAIN

## 2020-08-06 NOTE — PROGRESS NOTES
Attending Progress Note      PCP: Jessica Abreu MD    Patient: Elias Hammond   Gender: female  : 1970   Age: 44 y.o. MRN: [id number]      Date of Admission: 2020    Chief Complaint:   Chief Complaint   Patient presents with    Seizures    Abdominal Pain           Subjective:has headache and abd pain . .. seen at MRI . Derril Tell anxious . Derril Olya  Ativan IV ordered         Medications:  Reviewed  Infusion Medications    dextrose 5 % and 0.45 % NaCl 100 mL/hr at 20 1408    sodium chloride 75 mL/hr at 20 1826     Scheduled Medications    pantoprazole  40 mg Intravenous Daily    tiZANidine  6 mg Oral Nightly    valproate sodium (DEPACON) IVPB  500 mg Intravenous Q8H    levetiracetam  1,000 mg Intravenous Q12H    PARoxetine  50 mg Oral Nightly    gabapentin  800 mg Oral TID    levothyroxine  125 mcg Oral Daily    CENTRUM/CERTA-LILLY with minerals oral  15 mL Oral Daily    calcium carbonate  500 mg Oral BID    estradiol  0.5 mg Oral Daily    atenolol  25 mg Oral Daily    sodium chloride flush  10 mL Intravenous 2 times per day    nicotine  1 patch Transdermal Daily    enoxaparin  40 mg Subcutaneous Daily    guaiFENesin  1,200 mg Oral BID    vitamin D  5,000 Int'l Units Oral Daily     PRN Meds: morphine, promethazine, oxyCODONE-acetaminophen, dicyclomine, LORazepam, ondansetron, sodium chloride flush, potassium chloride **OR** potassium alternative oral replacement **OR** potassium chloride, magnesium sulfate, acetaminophen **OR** acetaminophen, polyethylene glycol, fleet, [DISCONTINUED] promethazine **OR** ondansetron, zolpidem, benzonatate, calcium carbonate, ondansetron    No intake or output data in the 24 hours ending 20 1658    Exam:  BP (!) 100/53   Pulse 58   Temp 98.5 °F (36.9 °C) (Oral)   Resp 15   Ht 5' 4\" (1.626 m)   Wt 278 lb 7 oz (126.3 kg)   SpO2 98%   BMI 47.79 kg/m²   General appearance: No distress,   Respiratory:  good air entry , no Rales , No wheezing, or rhonchi,  Cardiovascular: RRR, with normal S1/S2 . Abdomen : Soft, tender, non-distended  , normal bowel sounds. Legs : No edema bilaterally. No DVT signs ,    Neurologic:  Alert and oriented ,weak         Labs:   Recent Labs     08/05/20  1416 08/06/20  0200   WBC 4.3 5.6   HGB 11.3* 11.6*   HCT 36.6* 37.1    223     Recent Labs     08/05/20  1416 08/06/20  0200    140   K 4.3 4.1    106   CO2 26 26   BUN 21 15   CREATININE 0.7 0.8   CALCIUM 10.3 10.1     Recent Labs     08/05/20  1416   AST 17   ALT 19   BILITOT 0.2   ALKPHOS 111     No results for input(s): INR in the last 72 hours. No results for input(s): Chuy Lust in the last 72 hours. Assessment/Plan:    Active Hospital Problems    Diagnosis Date Noted    Seizure [facility]) [R56.9] 07/17/2020     h/o pseudoseizure   S/p gastric sleep 2019  Morbid obesity   Anxiety   Chronic abdominal pain   H/o chronic pancreatitis   Questionable hematemesis         Assessment/Plan:   Tele   IVF   Cont Keppra , neuro input noted . Micheal Distad Brain MRI  Ordered  , seizure precautions   GI input noted , monitor H/H ,  Home meds , reviewed and resumed as appropriate   Symptoms releif/Pain control  DVT proph   DVT Prophylaxis   Diet: DIET CLEAR LIQUID;  Code Status: Full Code    Treatment progress and plan was d/w pt/family .         Mari Darnell MD

## 2020-08-06 NOTE — CONSULTS
ago when she was experiencing a terrible 10 out of 10 abdominal pain. A day at the bedside we discussed follow-up in the office that she needs ambulatory EEG and she is agreeable to this, she is also agreeable to start [address] which her primary care physician has already started at 1000 mg twice a day and she has been taking every day, is also agreeable to increase her Zanaflex and she understands that the gabapentin is also a good headache preventative medication. We discussed keeping a headache diary, she is in agreement. He denies chest pain shortness of breath for me at the bedside. No facial droop aphasia or dysarthria. No diplopia loss of vision. Currently at the bedside she says she has a 4 out of 10 bilateral tension type headache with photosensitivity that is throbbing. Past Medical History:   Diagnosis Date    Acid reflux     Anemia     Anxiety     Arthritis     Hands, Back And Ankles    Bleeding ulcer 2014    \"I Had Ulcers In My Stomach And Colon\"/ per pt on 8/12/2019\"they said recently having some blood in my stomach- in July ( 2019)could not find where coming from \"    Bronchitis Last Episode 2014    Chronic back pain     Chronic pain     Sees Dr. Annette Leyva COPD (chronic obstructive pulmonary disease) ([facility] [address]     Sees Dr. Jacqui Tejeda Depression     Fibromyalgia Dx 2013    H/O echocardiogram 8/11/15    EF >55%. LA to be at the upper limit of normal in size. LV hypertrophy with normal LV systolic, but abnormal diastolic function. Normal valvular structures and function.  H/O echocardiogram 08/30/2018    EF 55-60%    Hiatal hernia     History of blood transfusion 09/2015 And 2018    No Reaction To Blood Transfusions Received    [place] (hard of hearing)     Right Ear    Hx of cardiac catheterization 10/24/2010    Angiographically normal coronary arteries w/ normal LV function and wall motion.     Hx of transesophageal echocardiography (PILO) for monitoring REPAIR N/A 2/12/2019    HERNIA HIATAL LAPAROSCOPIC ROBOTIC performed by Yue Myrick MD at [address]  10/1997    Partial Abdominal Hysterectomy    INSERTION / REMOVAL / REPLACEMENT VENOUS ACCESS CATHETER N/A 8/15/2019    PORT INSERTION performed by Yue Myrick MD at [address] [facility] [address]    removed after 6 months    LUNG REMOVAL, PARTIAL Left 2008    Benign    OTHER SURGICAL HISTORY  02/1998    \"Tubes And Ovaries Removed, Appendectomy Also Done\"    OTHER SURGICAL HISTORY  Last Done 7-15-16    Mediport Insertion \"Total Of Six Done, Removed Last Mediport In 2014\"    PORT SURGERY N/A 1/15/2020    PORT REMOVAL performed by Yue Myrick MD at [facility] N/A 7/6/2020    PORT INSERTION performed by Yue Myrick MD at [address] [facility] N/A 2/12/2019    GASTRECTOMY SLEEVE LAPAROSCOPIC ROBOTIC performed by Yue Myrick MD at [address] [facility]  08/27/2018    UPPER GASTROINTESTINAL ENDOSCOPY N/A 4/2/2019    EGD CONTROL HEMORRHAGE with epi injection at bleeding site performed by Yue Myrick MD at [address] 6/19/2019    EGD DIAGNOSTIC ONLY performed by Yue Myrick MD at [address] [facility] 7/22/2019    EGD DIAGNOSTIC ONLY performed by Deandre Burnette MD at [address] N/A 10/14/2019    EGD DIAGNOSTIC ONLY performed by Yue Myrick MD at [address] [facility] N/A 7/17/2020    EGJ DILATATION BALLOON WITH 18-20 MM BALLOON, DILATED TO 20 MM. performed by Yue Myrick MD at [facility] ENDOSCOPY     Medications:  Scheduled Meds:   pantoprazole  40 mg Intravenous Daily    levetiracetam  1,000 mg Intravenous Q12H    PARoxetine  50 mg Oral Nightly    gabapentin  800 mg Oral TID    levothyroxine  125 mcg Oral Daily    CENTRUM/CERTA-LILLY with minerals oral  15 mL Oral Daily    calcium carbonate  500 mg Oral BID    estradiol  0.5 mg Oral Daily    atenolol  25 mg Oral Daily    sodium chloride flush  10 mL Intravenous 2 times per day    nicotine  1 patch Transdermal Daily    enoxaparin  40 mg Subcutaneous Daily    guaiFENesin  1,200 mg Oral BID    vitamin D  5,000 Int'l Units Oral Daily     Continuous Infusions:   dextrose 5 % and 0.45 % NaCl 100 mL/hr at 08/06/20 0342    sodium chloride 75 mL/hr at 08/05/20 1826     PRN Meds:.morphine, promethazine, oxyCODONE-acetaminophen, dicyclomine, LORazepam, ondansetron, sodium chloride flush, potassium chloride **OR** potassium alternative oral replacement **OR** potassium chloride, magnesium sulfate, acetaminophen **OR** acetaminophen, polyethylene glycol, fleet, [DISCONTINUED] promethazine **OR** ondansetron, zolpidem, benzonatate, tiZANidine, calcium carbonate, ondansetron    Allergies   Allergen Reactions    Aspirin Palpitations     \"My Heart Rate Elevates\"    Shellfish-Derived Products Swelling    Toradol [Ketorolac Tromethamine] Rash    Compazine [Prochlorperazine]     Reglan [Metoclopramide] Itching     Social History     Socioeconomic History    Marital status:      Spouse name: Not on file    Number of children: Not on file    Years of education: Not on file    Highest education level: Not on file   Occupational History    Not on file   Social Needs    Financial resource strain: Not on file    Food insecurity     Worry: Not on file     Inability: Not on file   [employer] needs     Medical: Not on file     Non-medical: Not on file   Tobacco Use    Smoking status: Never Smoker    Smokeless tobacco: Never Used   Substance and Sexual Activity    Alcohol use: No     Alcohol/week: 0.0 standard drinks    Drug use: No    Sexual activity: Not Currently   Lifestyle    Physical activity     Days per week: Not on file     Minutes per session: Not on file  Stress: Not on file   Relationships    Social connections     Talks on phone: Not on file     Gets together: Not on file     Attends Spiritism service: Not on file     Active member of club or organization: Not on file     Attends meetings of clubs or organizations: Not on file     Relationship status: Not on file    Intimate partner violence     Fear of current or ex partner: Not on file     Emotionally abused: Not on file     Physically abused: Not on file     Forced sexual activity: Not on file   Other Topics Concern    Not on file   Social History Narrative    Not on file      Family History   Problem Relation Age of Onset    Diabetes Mother         \"Borderline Diabetes\"    High Blood Pressure Mother     Obesity Mother     Arthritis Mother     Heart Disease Mother     High Cholesterol Mother     Vision Loss Mother     Diabetes Father     High Blood Pressure Father     Asthma Father     Cancer Father         prostate cancer    High Blood Pressure Brother     Asthma Son     Vision Loss Son     Lupus Daughter     Other Daughter         \"Alot Of Female Problems\"       Review of Symptoms:    14-point system review completed. All of which are negative except as mentioned above. Physical Exam:       @[facility]@     Gen: A&O x 4, NAD, cooperative, obese  HEENT: NC/AT, EOMI, PERRL, left occiput tenderness mmm, , neck supple, no meningeal signs;    Heart: Regular   Lungs: no distress  Ext: no edema, no calf tenderness b/l  Psych: normal mood and affect  Skin: no rashes or lesions    NEUROLOGIC EXAM:    Mental Status: A&O to self, location, month and year, NAD, speech clear, language fluent, repetition and naming intact, follows commands appropriately    Cranial Nerve Exam:   CN II-XII: , PERRL, VFF, no nystagmus, no gaze paresis, sensation V1-V3 intact b/l, muscles of facial expression symmetric; hearing intact to conversational tone, palate elevates symmetrically, shoulder elevation symmetric and tongue protrudes midline with movement side to side. Motor Exam:       Strength 5/5 UE's/LE's b/l  Tone and bulk normal   No pronator drift    Deep Tendon Reflexes: 2/4 biceps, triceps, brachioradialis, patellar, and achilles b/l; flexor plantar responses b/l    Sensation: Intact light touch  UE's/LE's b/l    Coordination/Cerebellum:       Tremors--none      Rapidly alternating movements: no dysdiadochokinesia b/l                Finger-to-Nose: no dysmetria b/l    Gait and stance:      Gait: deferred      LABS:     Recent Labs     20  1416 20  0200   WBC 4.3 5.6    140   K 4.3 4.1    106   CO2 26 26   BUN 21 15   CREATININE 0.7 0.8   GLUCOSE 93 79         IMAGING:    MRI brain with and without pending  EEG from 2020 nonacute normal awake      ASSESSMENT/PLAN:     3 44year old female with reported seizure with associated bilateral tension type headache, will rule out any central etiology for headache and seizure, likely reactionary seizure with atypical migraine superimposed on hx of chronic pancreatitis, gastric bypass and lupus. Plan of care as follows:  1. Neuro exam:  1. Left occiput tenderness otherwise nonfocal exam  2. Neurodiagnostics:  1. MRI with and without pending  3. Medications:  1. Increase Zanaflex to 6 mg nightly  2. Continue on gabapentin 800 mg 3 times daily  3. Okay with Keppra 1000 mg twice daily  4. Will use Depacon 500 mg IV push for 3 doses, Norflex 60mg IVP once along with Decadron 10 mg once for acute headache . 4. PT/OT/ST:  1. Per their recommendations  5. Follow-up:  1. Discussed with patient we need outpatient ambulatory EEG, no driving, no ladders no tub baths no heavy machinery no hot tubs  2. Further recommendations pending completion of neurodiagnostics        Thank you for allowing us to participate in the care of your patient. If there are any questions regarding evaluation please feel free to contact us.      MICHAEL Mendes - CNP, 8/6/2020  Attending Note:  I have rounded on this patient with Eliu Greenfield CNP. I have reviewed the chart and we have discussed this case in detail. The patient was seen and examined by myself. Pertinent labs and imaging have been personally reviewed. Our findings and impressions were discussed with the patient. I concur with the Nurse Practioner's assessment and plan.     Estrellita Gonzalez DO 8/11/2020 9:54 AM

## 2020-08-06 NOTE — CONSULTS
[address], [address] [facility]                                  CONSULTATION    PATIENT NAME: Homero Baca                  :        1970  MED REC NO:   [id number]                          ROOM:       3005  ACCOUNT NO:   [de-identified]                           ADMIT DATE: 2020  PROVIDER:     Rachel Jarrett MD    CONSULT DATE:  2020    PRIMARY CARE PROVIDER:  Thais Ramirez MD    CHIEF COMPLAINT:  History of upper abdominal pain with nausea, vomiting,  and mild hematemesis, rule out upper GI bleeding. HISTORY OF PRESENT ILLNESS:  The patient is a 77-year-old white female,  patient known to me from her multiple previous hospitalizations, with  past medical history significant for morbid obesity, status post sleeve  gastrectomy; lupus; hypertension; depression/anxiety; gastroesophageal  reflux disease; peptic ulcer disease; hypothyroidism; history of  gallstones, status post cholecystectomy, complicated by bile duct injury  with at least seven or eight ERCPs done in [place], [place], and  also extensive workup done for sphincter of Oddi dysfunction with  pancreatic/biliary stents placed, which were subsequently removed; also  history of acute/recurrent/chronic pancreatitis; anemia; chronic back  pain; fibromyalgia; recurrent upper GI bleeding with multiple EGDs, who  presented to the hospital on 2020 with about four to five-day  history of upper abdominal pain along with nausea, vomiting, and mild  hematemesis. The patient's hemoglobin upon admission was 11.3 gm  percent and today is 11.6 gm percent. The patient has had a few  episodes of vomiting since admission and the patient also had a  witnessed seizure in the ER as well. The patient is hemodynamically  stable and is on Protonix. There is no history of melena or  hematochezia.     The patient has had an extensive GI workup done in the past including  multiple EGDs with at least three EGDs done by me, the last EGD was on  07/22/2019 and postsurgical changes were noted from previous sleeve  gastrectomy and gastritis with old blood noted. There was no evidence  of active peptic ulcer disease. The patient also is being regularly  followed up by her surgical consultant, Dr. Aleyda De La O, who was done at  least four EGDs with the first EGD on 08/27/2018 showed postsurgical  changes from sleeve gastrectomy along with mild gastritis, second EGD on  03/12/2019 for upper GI bleeding was unremarkable, third EGD on  04/02/2019 showed bleeding from the distal staple line, which was  injected with epinephrine solution, and the fourth EGD by Dr. Pepe Elizondo on  06/19/2019 again was unremarkable with no evidence of upper GI bleeding. The patient also has had colonoscopy done in the past as well. The  patient is being followed up by Dr. Maricarmen Wolfe also for pain management. REVIEW OF SYSTEMS:  CENTRAL NERVOUS SYSTEM:  The patient denies headache or focal  sensorimotor symptoms; however, the patient did have a witnessed seizure  in the ER during the present hospitalization. CARDIOVASCULAR SYSTEM:  No history of chest pain, shortness of breath,  or leg swelling. GENITOURINARY SYSTEM:  No history of dysuria, pyuria, or hematuria. MUSCULOSKELETAL SYSTEM:  No history of aches and pains in muscles and  joints. RESPIRATORY SYSTEM:  No history of cough, hemoptysis, fever, or chills.     PAST MEDICAL HISTORY:  Significant for history of morbid obesity, status  post sleeve gastrectomy by Dr. Pepe Elizondo; lupus; hypertension;  depression/anxiety; gastroesophageal reflux disease; peptic ulcer  disease; hypothyroidism; history of gallstones, status post  cholecystectomy, complicated by bile duct injury with at least seven or  eight ERCPs done in [place], [place] and extensive workup for  sphincter of Oddi dysfunction, for which the patient had biliary and  pancreatic stents placed, which were subsequently removed; history of  acute/recurrent/chronic pancreatitis; anemia; chronic back pain;  fibromyalgia; recurrent episodes of upper GI bleeding, requiring  multiple EGDs; history of seizure disorder; and chronic pain syndrome. FAMILY HISTORY:  The patient's father was diagnosed with carcinoma of  the prostate, maternal aunt with carcinoma of the breast, maternal  grandmother with carcinoma of the \"stomach,\" and maternal grandfather  with carcinoma of the lung. MEDICATIONS:  Please refer to chart. SOCIOECONOMIC HISTORY:  No history of EtOH abuse. The patient does not  smoke cigarettes. PAST SURGICAL HISTORY:  The patient has had total abdominal  hysterectomy, cholecystectomy, MediPort placed, which got infected and  was removed by Dr. Merissa Mtz recently, appendectomy, tonsillectomy and  adenoidectomy, partial removal of the left lung for benign disease, foot  surgery, dental surgery, , and multiple EGDs done with at least  one colonoscopy by Dr. Sunil Roman around six years ago, and the patient also  has had seven or eight ERCPs done in [place], [place] for common  bile duct stone/sphincter of Oddi dysfunction with placement of biliary  and pancreatic stents, which were subsequently removed. The patient  also has had at least two EGDs done by Dr. Narayan Becker, three EGDs by me, and  four EGDs by Dr. Merissa Mzt.  The patient also had surgery done on her left  foot for osteomyelitis within the last one year. ALLERGIES:  The patient is allergic to ASPIRIN, COMPAZINE, REGLAN,  SHELLFISH, DAIRY PRODUCTS, and TORADOL. PHYSICAL EXAMINATION:  GENERAL:  Shows a 68-year-old white female who is obese, lying flat in  bed, in no acute distress. She is awake, alert, and oriented and  pleasant to talk with. VITAL SIGNS:  Stable. HEENT:  Shows skull to be atraumatic. NECK:  Supple. CHEST:  Clear. HEART:  S1 and S2 are normal.  ABDOMEN:  Soft, obese with mild upper abdominal tenderness.   No guarding  or rigidity. Liver and spleen are not palpable. Bowel sounds are  present. RECTAL:  Deferred. CNS:  Shows the patient to be awake, alert, and oriented. There are no  focal sensorimotor signs. MUSCULOSKELETAL SYSTEM:  Shows edema of the lower extremities. LABORATORY DATA:  As above mentioned. IMPRESSION:  3  A 49-year-old white female with multiple comorbidities and chronic  pain syndrome, presents with upper abdominal pain with nausea, vomiting,  mild hematemesis, rule out gastritis/peptic ulcer disease? 2. Extensive GI workup done in the past as above mentioned. RECOMMENDATIONS:  1. Agree with present management with parenteral analgesics and  antiemetics. 2.  We will start the patient on clear liquid diet and advance as  tolerated. 3.  Monitor the patient's CBC, chem profile, amylase, lipase, PT/PTT,  and INR in a.m. 4.  No need for repeat EGD since the patient has had multiple EGDs done  in the past and H and H are stable. 5.  The case and plan have been discussed in detail with patient.         Krystle Mitchell MD    D: 08/06/2020 8:27:31       T: 08/06/2020 11:14:43     [id number]  Job#: [id number]     Doc#: [id number]    CC:  Dimitris Carter MD

## 2020-08-07 LAB
ALBUMIN SERPL-MCNC: 4.1 GM/DL (ref 3.4–5)
ALP BLD-CCNC: 111 IU/L (ref 40–129)
ALT SERPL-CCNC: 21 U/L (ref 10–40)
AMYLASE: 26 U/L (ref 25–115)
ANION GAP SERPL CALCULATED.3IONS-SCNC: 11 MMOL/L (ref 4–16)
APTT: 26.5 SECONDS (ref 25.1–37.1)
AST SERPL-CCNC: 17 IU/L (ref 15–37)
BILIRUB SERPL-MCNC: 0.1 MG/DL (ref 0–1)
BUN BLDV-MCNC: 14 MG/DL (ref 6–23)
CALCIUM SERPL-MCNC: 10.3 MG/DL (ref 8.3–10.6)
CHLORIDE BLD-SCNC: 107 MMOL/L (ref 99–110)
CO2: 22 MMOL/L (ref 21–32)
CREAT SERPL-MCNC: 0.7 MG/DL (ref 0.6–1.1)
GFR AFRICAN AMERICAN: >60 ML/MIN/1.73M2
GFR NON-AFRICAN AMERICAN: >60 ML/MIN/1.73M2
GLUCOSE BLD-MCNC: 114 MG/DL (ref 70–99)
HCT VFR BLD CALC: 35.7 % (ref 37–47)
HEMOGLOBIN: 10.9 GM/DL (ref 12.5–16)
INR BLD: 0.93 INDEX
LIPASE: 19 IU/L (ref 13–60)
MCH RBC QN AUTO: 26.3 PG (ref 27–31)
MCHC RBC AUTO-ENTMCNC: 30.5 % (ref 32–36)
MCV RBC AUTO: 86.2 FL (ref 78–100)
PDW BLD-RTO: 13.6 % (ref 11.7–14.9)
PLATELET # BLD: 211 K/CU MM (ref 140–440)
PMV BLD AUTO: 10.1 FL (ref 7.5–11.1)
POTASSIUM SERPL-SCNC: 4.7 MMOL/L (ref 3.5–5.1)
PROTHROMBIN TIME: 11.2 SECONDS (ref 11.7–14.5)
RBC # BLD: 4.14 M/CU MM (ref 4.2–5.4)
SODIUM BLD-SCNC: 140 MMOL/L (ref 135–145)
TOTAL PROTEIN: 6.2 GM/DL (ref 6.4–8.2)
WBC # BLD: 5.9 K/CU MM (ref 4–10.5)

## 2020-08-07 PROCEDURE — 96372 THER/PROPH/DIAG INJ SC/IM: CPT

## 2020-08-07 PROCEDURE — 99215 OFFICE O/P EST HI 40 MIN: CPT | Performed by: NURSE PRACTITIONER

## 2020-08-07 PROCEDURE — 96375 TX/PRO/DX INJ NEW DRUG ADDON: CPT

## 2020-08-07 PROCEDURE — 80053 COMPREHEN METABOLIC PANEL: CPT

## 2020-08-07 PROCEDURE — 96376 TX/PRO/DX INJ SAME DRUG ADON: CPT

## 2020-08-07 PROCEDURE — 6360000002 HC RX W HCPCS: Performed by: PHYSICIAN ASSISTANT

## 2020-08-07 PROCEDURE — 85730 THROMBOPLASTIN TIME PARTIAL: CPT

## 2020-08-07 PROCEDURE — 6370000000 HC RX 637 (ALT 250 FOR IP): Performed by: FAMILY MEDICINE

## 2020-08-07 PROCEDURE — 6360000002 HC RX W HCPCS: Performed by: SPECIALIST

## 2020-08-07 PROCEDURE — 82150 ASSAY OF AMYLASE: CPT

## 2020-08-07 PROCEDURE — C9113 INJ PANTOPRAZOLE SODIUM, VIA: HCPCS | Performed by: SPECIALIST

## 2020-08-07 PROCEDURE — 6360000002 HC RX W HCPCS: Performed by: FAMILY MEDICINE

## 2020-08-07 PROCEDURE — 83690 ASSAY OF LIPASE: CPT

## 2020-08-07 PROCEDURE — G0378 HOSPITAL OBSERVATION PER HR: HCPCS

## 2020-08-07 PROCEDURE — 6360000002 HC RX W HCPCS: Performed by: NURSE PRACTITIONER

## 2020-08-07 PROCEDURE — 2580000003 HC RX 258: Performed by: PHYSICIAN ASSISTANT

## 2020-08-07 PROCEDURE — 2580000003 HC RX 258: Performed by: FAMILY MEDICINE

## 2020-08-07 PROCEDURE — 2500000003 HC RX 250 WO HCPCS: Performed by: NURSE PRACTITIONER

## 2020-08-07 PROCEDURE — 2580000003 HC RX 258: Performed by: NURSE PRACTITIONER

## 2020-08-07 PROCEDURE — 85610 PROTHROMBIN TIME: CPT

## 2020-08-07 PROCEDURE — 85027 COMPLETE CBC AUTOMATED: CPT

## 2020-08-07 PROCEDURE — 6370000000 HC RX 637 (ALT 250 FOR IP): Performed by: NURSE PRACTITIONER

## 2020-08-07 RX ORDER — DEXAMETHASONE SODIUM PHOSPHATE 4 MG/ML
10 INJECTION, SOLUTION INTRA-ARTICULAR; INTRALESIONAL; INTRAMUSCULAR; INTRAVENOUS; SOFT TISSUE ONCE
Status: COMPLETED | OUTPATIENT
Start: 2020-08-07 | End: 2020-08-07

## 2020-08-07 RX ORDER — DIPHENHYDRAMINE HYDROCHLORIDE 50 MG/ML
50 INJECTION INTRAMUSCULAR; INTRAVENOUS ONCE
Status: COMPLETED | OUTPATIENT
Start: 2020-08-07 | End: 2020-08-07

## 2020-08-07 RX ADMIN — SODIUM CHLORIDE, PRESERVATIVE FREE 10 ML: 5 INJECTION INTRAVENOUS at 11:25

## 2020-08-07 RX ADMIN — GABAPENTIN 800 MG: 400 CAPSULE ORAL at 05:39

## 2020-08-07 RX ADMIN — Medication 5000 UNITS: at 11:28

## 2020-08-07 RX ADMIN — GABAPENTIN 800 MG: 400 CAPSULE ORAL at 15:53

## 2020-08-07 RX ADMIN — BENZONATATE 200 MG: 100 CAPSULE ORAL at 11:24

## 2020-08-07 RX ADMIN — LORAZEPAM 1 MG: 1 TABLET ORAL at 11:44

## 2020-08-07 RX ADMIN — MORPHINE SULFATE 2 MG: 2 INJECTION, SOLUTION INTRAMUSCULAR; INTRAVENOUS at 06:38

## 2020-08-07 RX ADMIN — PROMETHAZINE HYDROCHLORIDE 12.5 MG: 25 INJECTION INTRAMUSCULAR; INTRAVENOUS at 23:48

## 2020-08-07 RX ADMIN — MULTIVITAMIN 15 ML: LIQUID ORAL at 11:29

## 2020-08-07 RX ADMIN — ENOXAPARIN SODIUM 40 MG: 40 INJECTION SUBCUTANEOUS at 11:25

## 2020-08-07 RX ADMIN — VALPROATE SODIUM 500 MG: 100 INJECTION, SOLUTION INTRAVENOUS at 05:39

## 2020-08-07 RX ADMIN — MORPHINE SULFATE 2 MG: 2 INJECTION, SOLUTION INTRAMUSCULAR; INTRAVENOUS at 15:53

## 2020-08-07 RX ADMIN — GUAIFENESIN 1200 MG: 600 TABLET, EXTENDED RELEASE ORAL at 20:03

## 2020-08-07 RX ADMIN — ATENOLOL 25 MG: 25 TABLET ORAL at 11:24

## 2020-08-07 RX ADMIN — OXYCODONE HYDROCHLORIDE AND ACETAMINOPHEN 1 TABLET: 5; 325 TABLET ORAL at 20:08

## 2020-08-07 RX ADMIN — DEXAMETHASONE SODIUM PHOSPHATE 10 MG: 4 INJECTION, SOLUTION INTRAMUSCULAR; INTRAVENOUS at 15:53

## 2020-08-07 RX ADMIN — LEVETIRACETAM 1000 MG: 100 INJECTION, SOLUTION INTRAVENOUS at 02:48

## 2020-08-07 RX ADMIN — ONDANSETRON 8 MG: 2 INJECTION INTRAMUSCULAR; INTRAVENOUS at 11:44

## 2020-08-07 RX ADMIN — LORAZEPAM 1 MG: 1 TABLET ORAL at 15:53

## 2020-08-07 RX ADMIN — DEXTROSE AND SODIUM CHLORIDE: 5; 450 INJECTION, SOLUTION INTRAVENOUS at 11:44

## 2020-08-07 RX ADMIN — PAROXETINE 50 MG: 10 TABLET, FILM COATED ORAL at 20:03

## 2020-08-07 RX ADMIN — MORPHINE SULFATE 2 MG: 2 INJECTION, SOLUTION INTRAMUSCULAR; INTRAVENOUS at 00:29

## 2020-08-07 RX ADMIN — PANTOPRAZOLE SODIUM 40 MG: 40 INJECTION, POWDER, FOR SOLUTION INTRAVENOUS at 11:25

## 2020-08-07 RX ADMIN — PROMETHAZINE HYDROCHLORIDE 12.5 MG: 25 INJECTION INTRAMUSCULAR; INTRAVENOUS at 00:29

## 2020-08-07 RX ADMIN — DICYCLOMINE HYDROCHLORIDE 10 MG: 10 CAPSULE ORAL at 11:24

## 2020-08-07 RX ADMIN — OXYCODONE HYDROCHLORIDE AND ACETAMINOPHEN 1 TABLET: 5; 325 TABLET ORAL at 11:44

## 2020-08-07 RX ADMIN — ZOLPIDEM TARTRATE 5 MG: 5 TABLET, COATED ORAL at 23:48

## 2020-08-07 RX ADMIN — LEVOTHYROXINE SODIUM 125 MCG: 125 TABLET ORAL at 05:39

## 2020-08-07 RX ADMIN — DEXTROSE AND SODIUM CHLORIDE: 5; 450 INJECTION, SOLUTION INTRAVENOUS at 02:48

## 2020-08-07 RX ADMIN — TIZANIDINE 6 MG: 2 TABLET ORAL at 20:03

## 2020-08-07 RX ADMIN — CALCIUM CARBONATE 500 MG: 500 TABLET, CHEWABLE ORAL at 20:03

## 2020-08-07 RX ADMIN — LEVETIRACETAM 1000 MG: 100 INJECTION, SOLUTION INTRAVENOUS at 15:54

## 2020-08-07 RX ADMIN — GABAPENTIN 800 MG: 400 CAPSULE ORAL at 20:03

## 2020-08-07 RX ADMIN — DIPHENHYDRAMINE HYDROCHLORIDE 50 MG: 50 INJECTION INTRAMUSCULAR; INTRAVENOUS at 18:42

## 2020-08-07 RX ADMIN — PROMETHAZINE HYDROCHLORIDE 12.5 MG: 25 INJECTION INTRAMUSCULAR; INTRAVENOUS at 15:53

## 2020-08-07 RX ADMIN — CALCIUM CARBONATE 500 MG: 500 TABLET, CHEWABLE ORAL at 11:24

## 2020-08-07 RX ADMIN — MORPHINE SULFATE 2 MG: 2 INJECTION, SOLUTION INTRAMUSCULAR; INTRAVENOUS at 23:48

## 2020-08-07 RX ADMIN — VALPROATE SODIUM 500 MG: 100 INJECTION, SOLUTION INTRAVENOUS at 15:54

## 2020-08-07 RX ADMIN — ESTRADIOL 0.5 MG: 1 TABLET ORAL at 11:29

## 2020-08-07 RX ADMIN — PROMETHAZINE HYDROCHLORIDE 12.5 MG: 25 INJECTION INTRAMUSCULAR; INTRAVENOUS at 06:37

## 2020-08-07 RX ADMIN — GUAIFENESIN 1200 MG: 600 TABLET, EXTENDED RELEASE ORAL at 11:24

## 2020-08-07 ASSESSMENT — PAIN SCALES - GENERAL
PAINLEVEL_OUTOF10: 10
PAINLEVEL_OUTOF10: 8
PAINLEVEL_OUTOF10: 8
PAINLEVEL_OUTOF10: 5
PAINLEVEL_OUTOF10: 8
PAINLEVEL_OUTOF10: 7
PAINLEVEL_OUTOF10: 0
PAINLEVEL_OUTOF10: 8

## 2020-08-07 ASSESSMENT — PAIN DESCRIPTION - PAIN TYPE
TYPE: ACUTE PAIN;CHRONIC PAIN
TYPE: ACUTE PAIN

## 2020-08-07 ASSESSMENT — PAIN DESCRIPTION - DESCRIPTORS
DESCRIPTORS: SHARP
DESCRIPTORS: SHARP

## 2020-08-07 ASSESSMENT — PAIN DESCRIPTION - PROGRESSION
CLINICAL_PROGRESSION: GRADUALLY WORSENING

## 2020-08-07 ASSESSMENT — PAIN DESCRIPTION - ONSET
ONSET: ON-GOING
ONSET: ON-GOING

## 2020-08-07 ASSESSMENT — PAIN DESCRIPTION - FREQUENCY
FREQUENCY: CONTINUOUS
FREQUENCY: CONTINUOUS

## 2020-08-07 ASSESSMENT — PAIN DESCRIPTION - LOCATION
LOCATION: ABDOMEN
LOCATION: ABDOMEN

## 2020-08-07 ASSESSMENT — PAIN DESCRIPTION - ORIENTATION
ORIENTATION: RIGHT;UPPER
ORIENTATION: RIGHT;UPPER

## 2020-08-07 NOTE — PROGRESS NOTES
Neurology Service Progress Note   [facility]  Patient Name: Yuliet Alston  : 1970        Subjective:   Patient seen and examined today. No acute changes overnight. Patient reports that the headache cocktail gave her yesterday was effective, her headache has reduced to 2 out of 10, but she would like 1 more round of medications to abort the headache fully, I am in agreement with this. We review her MRI. We review and extend to follow-up instructions ambulatory EEG understanding that she is on the [address] at this time because we cannot determine whether she is truly having epileptic or nonepileptic episodes that the ambulatory EEG would help us better determine the she verbalizes understanding. Denies chest pain or shortness of breath. Past Medical History:   Diagnosis Date    Acid reflux     Anemia     Anxiety     Arthritis     Hands, Back And Ankles    Bleeding ulcer     \"I Had Ulcers In My Stomach And Colon\"/ per pt on 2019\"they said recently having some blood in my stomach- in July ( )could not find where coming from \"    Bronchitis Last Episode     Chronic back pain     Chronic pain     Sees Dr. Elham Sargent COPD (chronic obstructive pulmonary disease) ([facility] [address]     Sees Dr. Benjaimn Burnett Depression     Fibromyalgia Dx     H/O echocardiogram 8/11/15    EF >55%. LA to be at the upper limit of normal in size. LV hypertrophy with normal LV systolic, but abnormal diastolic function. Normal valvular structures and function.  H/O echocardiogram 2018    EF 55-60%    Hiatal hernia     History of blood transfusion 2015 And     No Reaction To Blood Transfusions Received    [place] (hard of hearing)     Right Ear    Hx of cardiac catheterization 10/24/2010    Angiographically normal coronary arteries w/ normal LV function and wall motion.  Hx of transesophageal echocardiography (PILO) for monitoring 2010    EF 55-60%. WNL.    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 name   Mary Lack cysts     \"they found that I have a kidney cyst but not sure which side\" per pt on 7/15/2020    Lupus ([address] Dx 2013    \"Rheumatoid Lupus\"    MDRO (multiple drug resistant organisms) resistance     4 months ago chest from mediport    Morbid obesity ([address]     MRSA bacteremia     Nausea     \"Most Of The Time\"    Pain management     Sees Dr. Filippo Lancaster, Once A Month    Pancreatitis chronic Dx 2001    Pneumonia Dx 11-15    Shortness of breath on exertion     Shortness of breath on exertion     Sleep apnea     Has CPAP\"no longer use the cpap since lost weight\"    Staph infection Dx 11-15    Left Foot    Staph infection Dx 11-15    \"Left Foot\"    Teeth missing     Upper And Lower    Thyroid disease     Wears glasses     To Read    :   Past Surgical History:   Procedure Laterality Date    APPENDECTOMY  02/1998    Done When Tubes And Ovaries Were Removed    CARDIAC CATHETERIZATION  10/24/2010    CATHETER REMOVAL N/A 7/16/2019    PORT REMOVAL performed by Francesca Medina MD at [address]  08/27/1991    CHOLECYSTECTOMY, LAPAROSCOPIC  1990's    COLONOSCOPY  Last Done In 2000's    DENTAL SURGERY      Teeth Extracted In Past    ENDOSCOPY, COLON, DIAGNOSTIC  Last Done In 2018    FOOT DEBRIDEMENT Left 6/16/2019    FIRST METATARSAL DEBRIDEMENT INCISION AND DRAINAGE. EXCISION OF ULCER.  RESECTION OF BONE. 1ST METATARSAL POWER LAVAGE AND BONE CEMENT performed by Sinai Carvalho DPM at [address] Left Last Done In 2016     Dr. Alexia Terry, \" About 6 Surgeries Done Because Of Staph Infection\"    HIATAL HERNIA REPAIR N/A 2/12/2019    HERNIA HIATAL LAPAROSCOPIC ROBOTIC performed by Concha Connor MD at [facility]  10/1997    Partial Abdominal Hysterectomy    INSERTION / REMOVAL / REPLACEMENT VENOUS ACCESS CATHETER N/A 8/15/2019    PORT INSERTION performed by Concha Connor MD at [address] [facility]    removed after 6 months    LUNG REMOVAL, PARTIAL Left 2008    Benign    OTHER SURGICAL HISTORY  02/1998    \"Tubes And Ovaries Removed, Appendectomy Also Done\"    OTHER SURGICAL HISTORY  Last Done 7-15-16    Mediport Insertion \"Total Of Six Done, Removed Last Mediport In 2014\"    PORT SURGERY N/A 1/15/2020    PORT REMOVAL performed by Concha Connor MD at [facility] N/A 7/6/2020    PORT INSERTION performed by Concha Connor MD at [facility] N/A 2/12/2019    GASTRECTOMY SLEEVE LAPAROSCOPIC ROBOTIC performed by Concha Connor MD at [facility]  08/27/2018    UPPER GASTROINTESTINAL ENDOSCOPY N/A 4/2/2019    EGD CONTROL HEMORRHAGE with epi injection at bleeding site performed by Concha Connor MD at [address] [place] [address] 6/19/2019    EGD DIAGNOSTIC ONLY performed by Concha Connor MD at [address] [place] [address] 7/22/2019    EGD DIAGNOSTIC ONLY performed by Molly Ramirez MD at [address] [place] [address] N/A 10/14/2019    EGD DIAGNOSTIC ONLY performed by Concha Connor MD at [address] [place] [address] N/A 7/17/2020    EGJ DILATATION BALLOON WITH 18-20 MM BALLOON, DILATED TO 20 MM. performed by Concha Connor MD at [facility] ENDOSCOPY     Medications:  Scheduled Meds:   pantoprazole  40 mg Intravenous Daily    tiZANidine  6 mg Oral Nightly    levetiracetam  1,000 mg Intravenous Q12H    PARoxetine  50 mg Oral Nightly    gabapentin  800 mg Oral TID    levothyroxine  125 mcg Oral Daily    CENTRUM/CERTA-LILLY with minerals oral  15 mL Oral Daily    calcium carbonate  500 mg Oral BID    estradiol  0.5 mg Oral Daily    atenolol  25 mg Oral Daily    sodium chloride flush  10 mL Intravenous 2 times per day    nicotine  1 patch Transdermal Daily    enoxaparin  40 mg Subcutaneous Daily    guaiFENesin  1,200 mg Oral BID    vitamin D  5,000 Int'l Units Oral Daily     Continuous Infusions:   dextrose 5 % and 0.45 % NaCl 100 mL/hr at 08/07/20 1144    sodium chloride 75 mL/hr at 08/05/20 1826     PRN Meds:.morphine, promethazine, oxyCODONE-acetaminophen, dicyclomine, LORazepam, ondansetron, sodium chloride flush, potassium chloride **OR** potassium alternative oral replacement **OR** potassium chloride, magnesium sulfate, acetaminophen **OR** acetaminophen, polyethylene glycol, fleet, [DISCONTINUED] promethazine **OR** ondansetron, zolpidem, benzonatate, calcium carbonate, ondansetron    Allergies   Allergen Reactions    Aspirin Palpitations     \"My Heart Rate Elevates\"    Shellfish-Derived Products Swelling    Toradol [Ketorolac Tromethamine] Rash    Compazine [Prochlorperazine]     Reglan [Metoclopramide] Itching     Social History     Socioeconomic History    Marital status:      Spouse name: Not on file    Number of children: Not on file    Years of education: Not on file    Highest education level: Not on file   Occupational History    Not on file   Social Needs    Financial resource strain: Not on file    Food insecurity     Worry: Not on file     Inability: Not on file   [employer] needs     Medical: Not on file     Non-medical: Not on file   Tobacco Use    Smoking status: Never Smoker    Smokeless tobacco: Never Used   Substance and Sexual Activity    Alcohol use: No     Alcohol/week: 0.0 standard drinks    Drug use: No    Sexual activity: Not Currently   Lifestyle    Physical activity     Days per week: Not on file     Minutes per session: Not on file    Stress: Not on file   Relationships    Social connections     Talks on phone: Not on file     Gets together: Not on file     Attends [employer] service: Not on file     Active member of club or organization: Not on file     Attends meetings of clubs or organizations: Not on file     Relationship status: Not on file    Intimate partner violence     Fear of current or ex partner: Not on file     Emotionally abused: Not on file     Physically abused: Not on file     Forced sexual activity: Not on file   Other Topics Concern    Not on file   Social History Narrative    Not on file      Family History   Problem Relation Age of Onset    Diabetes Mother         \"Borderline Diabetes\"    High Blood Pressure Mother     Obesity Mother     Arthritis Mother     Heart Disease Mother     High Cholesterol Mother     Vision Loss Mother     Diabetes Father     High Blood Pressure Father     Asthma Father     Cancer Father         prostate cancer    High Blood Pressure Brother     Asthma Son     Vision Loss Son     Lupus Daughter     Other Daughter         \"Alot Of Female Problems\"       Review of Symptoms:    14-point system review completed. All of which are negative except as mentioned above. Physical Exam:       @[facility]@     Gen: A&O x 4, NAD, cooperative, obese  HEENT: NC/AT, EOMI, PERRL, left occiput tenderness mmm, , neck supple, no meningeal signs;    Heart: Regular   Lungs: no distress  Ext: no edema, no calf tenderness b/l  Psych: normal mood and affect  Skin: no rashes or lesions    NEUROLOGIC EXAM:    Mental Status: A&O to self, location, month and year, NAD, speech clear, language fluent, repetition and naming intact, follows commands appropriately    Cranial Nerve Exam:   CN II-XII: , PERRL, VFF, no nystagmus, no gaze paresis, sensation V1-V3 intact b/l, muscles of facial expression symmetric; hearing intact to conversational tone, palate elevates symmetrically, shoulder elevation symmetric and tongue protrudes midline with movement side to side. Motor Exam:       Strength 5/5 UE's/LE's b/l  Tone and bulk normal   No pronator drift    Deep Tendon Reflexes: 2/4 biceps, triceps, brachioradialis, patellar, and achilles b/l; flexor plantar responses b/l    Sensation: Intact light touch  UE's/LE's b/l    Coordination/Cerebellum:       Tremors--none      Rapidly alternating movements: no dysdiadochokinesia b/l                Finger-to-Nose: no dysmetria b/l    Gait and stance:      Gait: deferred      LABS:     Recent Labs     08/05/20  1416 08/06/20  0200 08/07/20  0600   WBC 4.3 5.6 5.9    140 140   K 4.3 4.1 4.7    106 107   CO2 26 26 22   BUN 21 15 14   CREATININE 0.7 0.8 0.7   GLUCOSE 93 79 114*   INR  --   --  0.93         IMAGING:    MRI brain with and without nonacute  EEG from 7/20/2020 nonacute normal awake      ASSESSMENT/PLAN:     3 44year old female with reported seizure with associated bilateral tension type headache, will rule out any central etiology for headache and seizure, likely reactionary seizure with atypical migraine superimposed on hx of chronic pancreatitis, gastric bypass and lupus. Plan of care as follows:  1. Neuro exam:  1. Left occiput tenderness otherwise nonfocal exam  2. Neurodiagnostics:  1. MRI with and without nonacute  3. Medications:  1. Increase Zanaflex to 6 mg nightly  2. Continue on gabapentin 800 mg 3 times daily  3. Okay with Keppra 1000 mg twice daily  4. Depacon 500 mg IV push for 3 doses, Decadron 10 mg IV push once, Benadryl 50 mg IV push once  4. PT/OT/ST:  1. Per their recommendations  5. Follow-up:  1. Discussed with patient we need outpatient ambulatory EEG, no driving, no ladders no tub baths no heavy machinery no hot tubs  2. Follow-up in our office 4 to 6 weeks        Thank you for allowing us to participate in the care of your patient. If there are any questions regarding evaluation please feel free to contact us.      Paris Hi, APRN - CNP, 8/7/2020

## 2020-08-08 LAB
REASON FOR REJECTION: NORMAL
REJECTED TEST: NORMAL

## 2020-08-08 PROCEDURE — 2580000003 HC RX 258: Performed by: NURSE PRACTITIONER

## 2020-08-08 PROCEDURE — 6370000000 HC RX 637 (ALT 250 FOR IP): Performed by: FAMILY MEDICINE

## 2020-08-08 PROCEDURE — 6370000000 HC RX 637 (ALT 250 FOR IP): Performed by: NURSE PRACTITIONER

## 2020-08-08 PROCEDURE — 2580000003 HC RX 258: Performed by: PHYSICIAN ASSISTANT

## 2020-08-08 PROCEDURE — 6360000002 HC RX W HCPCS: Performed by: PHYSICIAN ASSISTANT

## 2020-08-08 PROCEDURE — 2580000003 HC RX 258: Performed by: FAMILY MEDICINE

## 2020-08-08 PROCEDURE — 6360000002 HC RX W HCPCS: Performed by: FAMILY MEDICINE

## 2020-08-08 PROCEDURE — 96376 TX/PRO/DX INJ SAME DRUG ADON: CPT

## 2020-08-08 PROCEDURE — G0378 HOSPITAL OBSERVATION PER HR: HCPCS

## 2020-08-08 PROCEDURE — 85027 COMPLETE CBC AUTOMATED: CPT

## 2020-08-08 PROCEDURE — C9113 INJ PANTOPRAZOLE SODIUM, VIA: HCPCS | Performed by: SPECIALIST

## 2020-08-08 PROCEDURE — 96372 THER/PROPH/DIAG INJ SC/IM: CPT

## 2020-08-08 PROCEDURE — 2500000003 HC RX 250 WO HCPCS: Performed by: NURSE PRACTITIONER

## 2020-08-08 PROCEDURE — 6360000002 HC RX W HCPCS: Performed by: SPECIALIST

## 2020-08-08 PROCEDURE — 94761 N-INVAS EAR/PLS OXIMETRY MLT: CPT

## 2020-08-08 RX ADMIN — Medication 5000 UNITS: at 08:36

## 2020-08-08 RX ADMIN — CALCIUM CARBONATE 500 MG: 500 TABLET, CHEWABLE ORAL at 08:34

## 2020-08-08 RX ADMIN — PANTOPRAZOLE SODIUM 40 MG: 40 INJECTION, POWDER, FOR SOLUTION INTRAVENOUS at 08:34

## 2020-08-08 RX ADMIN — MORPHINE SULFATE 2 MG: 2 INJECTION, SOLUTION INTRAMUSCULAR; INTRAVENOUS at 14:40

## 2020-08-08 RX ADMIN — ATENOLOL 25 MG: 25 TABLET ORAL at 08:34

## 2020-08-08 RX ADMIN — GUAIFENESIN 1200 MG: 600 TABLET, EXTENDED RELEASE ORAL at 08:34

## 2020-08-08 RX ADMIN — DEXTROSE AND SODIUM CHLORIDE: 5; 450 INJECTION, SOLUTION INTRAVENOUS at 14:56

## 2020-08-08 RX ADMIN — LEVETIRACETAM 1000 MG: 100 INJECTION, SOLUTION INTRAVENOUS at 01:43

## 2020-08-08 RX ADMIN — ESTRADIOL 0.5 MG: 1 TABLET ORAL at 08:35

## 2020-08-08 RX ADMIN — GUAIFENESIN 1200 MG: 600 TABLET, EXTENDED RELEASE ORAL at 21:03

## 2020-08-08 RX ADMIN — MORPHINE SULFATE 2 MG: 2 INJECTION, SOLUTION INTRAMUSCULAR; INTRAVENOUS at 21:03

## 2020-08-08 RX ADMIN — PROMETHAZINE HYDROCHLORIDE 12.5 MG: 25 INJECTION INTRAMUSCULAR; INTRAVENOUS at 06:38

## 2020-08-08 RX ADMIN — GABAPENTIN 800 MG: 400 CAPSULE ORAL at 06:38

## 2020-08-08 RX ADMIN — TIZANIDINE 6 MG: 2 TABLET ORAL at 21:04

## 2020-08-08 RX ADMIN — LORAZEPAM 1 MG: 1 TABLET ORAL at 06:38

## 2020-08-08 RX ADMIN — LEVOTHYROXINE SODIUM 125 MCG: 125 TABLET ORAL at 06:38

## 2020-08-08 RX ADMIN — GABAPENTIN 800 MG: 400 CAPSULE ORAL at 14:39

## 2020-08-08 RX ADMIN — ENOXAPARIN SODIUM 40 MG: 40 INJECTION SUBCUTANEOUS at 08:34

## 2020-08-08 RX ADMIN — VALPROATE SODIUM 500 MG: 100 INJECTION, SOLUTION INTRAVENOUS at 00:43

## 2020-08-08 RX ADMIN — VALPROATE SODIUM 500 MG: 100 INJECTION, SOLUTION INTRAVENOUS at 08:36

## 2020-08-08 RX ADMIN — CALCIUM CARBONATE 500 MG: 500 TABLET, CHEWABLE ORAL at 21:05

## 2020-08-08 RX ADMIN — PAROXETINE 50 MG: 10 TABLET, FILM COATED ORAL at 21:04

## 2020-08-08 RX ADMIN — LORAZEPAM 1 MG: 1 TABLET ORAL at 14:57

## 2020-08-08 RX ADMIN — ZOLPIDEM TARTRATE 5 MG: 5 TABLET, COATED ORAL at 21:04

## 2020-08-08 RX ADMIN — SODIUM CHLORIDE: 9 INJECTION, SOLUTION INTRAVENOUS at 21:03

## 2020-08-08 RX ADMIN — PROMETHAZINE HYDROCHLORIDE 12.5 MG: 25 INJECTION INTRAMUSCULAR; INTRAVENOUS at 21:03

## 2020-08-08 RX ADMIN — SODIUM CHLORIDE, PRESERVATIVE FREE 10 ML: 5 INJECTION INTRAVENOUS at 08:36

## 2020-08-08 RX ADMIN — DEXTROSE AND SODIUM CHLORIDE: 5; 450 INJECTION, SOLUTION INTRAVENOUS at 00:43

## 2020-08-08 RX ADMIN — LEVETIRACETAM 1000 MG: 100 INJECTION, SOLUTION INTRAVENOUS at 15:55

## 2020-08-08 RX ADMIN — PROMETHAZINE HYDROCHLORIDE 12.5 MG: 25 INJECTION INTRAMUSCULAR; INTRAVENOUS at 14:40

## 2020-08-08 RX ADMIN — LORAZEPAM 1 MG: 1 TABLET ORAL at 21:04

## 2020-08-08 RX ADMIN — GABAPENTIN 800 MG: 400 CAPSULE ORAL at 21:04

## 2020-08-08 RX ADMIN — MORPHINE SULFATE 2 MG: 2 INJECTION, SOLUTION INTRAMUSCULAR; INTRAVENOUS at 06:39

## 2020-08-08 ASSESSMENT — PAIN SCALES - GENERAL
PAINLEVEL_OUTOF10: 8
PAINLEVEL_OUTOF10: 8
PAINLEVEL_OUTOF10: 3
PAINLEVEL_OUTOF10: 3
PAINLEVEL_OUTOF10: 7
PAINLEVEL_OUTOF10: 3

## 2020-08-08 ASSESSMENT — PAIN DESCRIPTION - FREQUENCY
FREQUENCY: CONTINUOUS
FREQUENCY: CONTINUOUS

## 2020-08-08 ASSESSMENT — PAIN DESCRIPTION - ORIENTATION: ORIENTATION: MID

## 2020-08-08 ASSESSMENT — PAIN DESCRIPTION - LOCATION
LOCATION: ABDOMEN
LOCATION: ABDOMEN

## 2020-08-08 ASSESSMENT — PAIN DESCRIPTION - PROGRESSION
CLINICAL_PROGRESSION: GRADUALLY WORSENING
CLINICAL_PROGRESSION: NOT CHANGED

## 2020-08-08 ASSESSMENT — PAIN DESCRIPTION - DESCRIPTORS
DESCRIPTORS: ACHING;CONSTANT
DESCRIPTORS: SHARP

## 2020-08-08 ASSESSMENT — PAIN DESCRIPTION - PAIN TYPE
TYPE: ACUTE PAIN
TYPE: CHRONIC PAIN;ACUTE PAIN

## 2020-08-08 ASSESSMENT — PAIN DESCRIPTION - ONSET
ONSET: ON-GOING
ONSET: ON-GOING

## 2020-08-08 NOTE — PROGRESS NOTES
Attending Progress Note      PCP: Lorene Roman MD    Patient: Yared Castellano   Gender: female  : 1970   Age: 44 y.o. MRN: [id number]      Date of Admission: 2020    Chief Complaint:   Chief Complaint   Patient presents with    Seizures    Abdominal Pain           Subjective:has headache and abd pain . .. still has nausea .          Medications:  Reviewed  Infusion Medications    dextrose 5 % and 0.45 % NaCl 100 mL/hr at 20 0043    sodium chloride 75 mL/hr at 20 1826     Scheduled Medications    pantoprazole  40 mg Intravenous Daily    tiZANidine  6 mg Oral Nightly    levetiracetam  1,000 mg Intravenous Q12H    PARoxetine  50 mg Oral Nightly    gabapentin  800 mg Oral TID    levothyroxine  125 mcg Oral Daily    CENTRUM/CERTA-LILLY with minerals oral  15 mL Oral Daily    calcium carbonate  500 mg Oral BID    estradiol  0.5 mg Oral Daily    atenolol  25 mg Oral Daily    sodium chloride flush  10 mL Intravenous 2 times per day    nicotine  1 patch Transdermal Daily    enoxaparin  40 mg Subcutaneous Daily    guaiFENesin  1,200 mg Oral BID    vitamin D  5,000 Int'l Units Oral Daily     PRN Meds: morphine, promethazine, oxyCODONE-acetaminophen, dicyclomine, LORazepam, ondansetron, sodium chloride flush, potassium chloride **OR** potassium alternative oral replacement **OR** potassium chloride, magnesium sulfate, acetaminophen **OR** acetaminophen, polyethylene glycol, fleet, [DISCONTINUED] promethazine **OR** ondansetron, zolpidem, benzonatate, calcium carbonate, ondansetron      Intake/Output Summary (Last 24 hours) at 2020 0906  Last data filed at 2020 1502  Gross per 24 hour   Intake 720 ml   Output --   Net 720 ml       Exam:  BP (!) 146/73   Pulse 81   Temp 98 °F (36.7 °C) (Oral)   Resp 19   Ht 5' 4\" (1.626 m)   Wt 286 lb 14.4 oz (130.1 kg)   SpO2 95%   BMI 49.25 kg/m²   General appearance: No distress,   Respiratory:  good air entry , no Rales , No wheezing, or rhonchi,  Cardiovascular: RRR, with normal S1/S2 . Abdomen : Soft, tender, non-distended  , normal bowel sounds. Legs : No edema bilaterally. No DVT signs ,    Neurologic:  Alert and oriented ,weak         Labs:   Recent Labs     08/05/20  1416 08/06/20  0200 08/07/20  0600   WBC 4.3 5.6 5.9   HGB 11.3* 11.6* 10.9*   HCT 36.6* 37.1 35.7*    223 211     Recent Labs     08/05/20  1416 08/06/20  0200 08/07/20  0600    140 140   K 4.3 4.1 4.7    106 107   CO2 26 26 22   BUN 21 15 14   CREATININE 0.7 0.8 0.7   CALCIUM 10.3 10.1 10.3     Recent Labs     08/05/20  1416 08/07/20  0600   AST 17 17   ALT 19 21   BILITOT 0.2 0.1   ALKPHOS 111 111     Recent Labs     08/07/20  0600   INR 0.93     No results for input(s): CKTOTAL, TROPONINI in the last 72 hours. Assessment/Plan:    Active Hospital Problems    Diagnosis Date Noted    Seizure [facility]) [R56.9] 07/17/2020     h/o pseudoseizure   S/p gastric sleep 2019  Morbid obesity   Anxiety   Chronic abdominal pain   H/o chronic pancreatitis   Questionable hematemesis         Assessment/Plan:   Advance diet . .. hopefully home tomorrow ,if no vomiting and tolerating PO   Tele - no event   IVF   Cont Keppra , neuro input noted . Nicole Patel Brain MRI : neg . . plan for outpt seizure workup  , seizure precautions   GI input noted , monitor H/H stable ,  Home meds , reviewed and resumed as appropriate   Symptoms releif/Pain control  DVT proph   DVT Prophylaxis   Diet: DIET FULL LIQUID;  Code Status: Full Code    Treatment progress and plan was d/w pt/family .         Slava Atkinson MD

## 2020-08-09 VITALS
OXYGEN SATURATION: 99 % | WEIGHT: 281 LBS | RESPIRATION RATE: 20 BRPM | HEART RATE: 73 BPM | BODY MASS INDEX: 47.97 KG/M2 | SYSTOLIC BLOOD PRESSURE: 127 MMHG | HEIGHT: 64 IN | DIASTOLIC BLOOD PRESSURE: 71 MMHG | TEMPERATURE: 98.9 F

## 2020-08-09 LAB
HCT VFR BLD CALC: 32.4 % (ref 37–47)
HEMOGLOBIN: 9.9 GM/DL (ref 12.5–16)
MCH RBC QN AUTO: 26.3 PG (ref 27–31)
MCHC RBC AUTO-ENTMCNC: 30.6 % (ref 32–36)
MCV RBC AUTO: 86.2 FL (ref 78–100)
PDW BLD-RTO: 14.1 % (ref 11.7–14.9)
PLATELET # BLD: 176 K/CU MM (ref 140–440)
PMV BLD AUTO: 10.4 FL (ref 7.5–11.1)
RBC # BLD: 3.76 M/CU MM (ref 4.2–5.4)
WBC # BLD: 5.5 K/CU MM (ref 4–10.5)

## 2020-08-09 PROCEDURE — 6370000000 HC RX 637 (ALT 250 FOR IP): Performed by: FAMILY MEDICINE

## 2020-08-09 PROCEDURE — 6360000002 HC RX W HCPCS: Performed by: PHYSICIAN ASSISTANT

## 2020-08-09 PROCEDURE — G0378 HOSPITAL OBSERVATION PER HR: HCPCS

## 2020-08-09 PROCEDURE — 2580000003 HC RX 258: Performed by: PHYSICIAN ASSISTANT

## 2020-08-09 PROCEDURE — 96372 THER/PROPH/DIAG INJ SC/IM: CPT

## 2020-08-09 PROCEDURE — 2580000003 HC RX 258: Performed by: FAMILY MEDICINE

## 2020-08-09 PROCEDURE — 85027 COMPLETE CBC AUTOMATED: CPT

## 2020-08-09 PROCEDURE — C9113 INJ PANTOPRAZOLE SODIUM, VIA: HCPCS | Performed by: SPECIALIST

## 2020-08-09 PROCEDURE — 6360000002 HC RX W HCPCS: Performed by: FAMILY MEDICINE

## 2020-08-09 PROCEDURE — 6360000002 HC RX W HCPCS: Performed by: SPECIALIST

## 2020-08-09 RX ORDER — LORAZEPAM 1 MG/1
1 TABLET ORAL DAILY PRN
Status: DISCONTINUED | OUTPATIENT
Start: 2020-08-09 | End: 2020-08-09 | Stop reason: HOSPADM

## 2020-08-09 RX ORDER — ONDANSETRON 4 MG/1
4 TABLET, ORALLY DISINTEGRATING ORAL EVERY 8 HOURS PRN
Qty: 30 TABLET | Refills: 5 | Status: ON HOLD | OUTPATIENT
Start: 2020-08-09 | End: 2021-01-13 | Stop reason: SDUPTHER

## 2020-08-09 RX ORDER — HEPARIN SODIUM (PORCINE) LOCK FLUSH IV SOLN 100 UNIT/ML 100 UNIT/ML
100 SOLUTION INTRAVENOUS PRN
Status: DISCONTINUED | OUTPATIENT
Start: 2020-08-09 | End: 2020-08-09 | Stop reason: HOSPADM

## 2020-08-09 RX ADMIN — ATENOLOL 25 MG: 25 TABLET ORAL at 09:29

## 2020-08-09 RX ADMIN — ENOXAPARIN SODIUM 40 MG: 40 INJECTION SUBCUTANEOUS at 09:29

## 2020-08-09 RX ADMIN — LEVOTHYROXINE SODIUM 125 MCG: 125 TABLET ORAL at 09:59

## 2020-08-09 RX ADMIN — MORPHINE SULFATE 2 MG: 2 INJECTION, SOLUTION INTRAMUSCULAR; INTRAVENOUS at 02:52

## 2020-08-09 RX ADMIN — GABAPENTIN 800 MG: 400 CAPSULE ORAL at 09:59

## 2020-08-09 RX ADMIN — LORAZEPAM 1 MG: 1 TABLET ORAL at 09:28

## 2020-08-09 RX ADMIN — ESTRADIOL 0.5 MG: 1 TABLET ORAL at 09:28

## 2020-08-09 RX ADMIN — PROMETHAZINE HYDROCHLORIDE 12.5 MG: 25 INJECTION INTRAMUSCULAR; INTRAVENOUS at 09:28

## 2020-08-09 RX ADMIN — GUAIFENESIN 1200 MG: 600 TABLET, EXTENDED RELEASE ORAL at 09:28

## 2020-08-09 RX ADMIN — Medication 100 UNITS: at 12:23

## 2020-08-09 RX ADMIN — OXYCODONE HYDROCHLORIDE AND ACETAMINOPHEN 1 TABLET: 5; 325 TABLET ORAL at 09:28

## 2020-08-09 RX ADMIN — CALCIUM CARBONATE 500 MG: 500 TABLET, CHEWABLE ORAL at 09:28

## 2020-08-09 RX ADMIN — PROMETHAZINE HYDROCHLORIDE 12.5 MG: 25 INJECTION INTRAMUSCULAR; INTRAVENOUS at 02:52

## 2020-08-09 RX ADMIN — SODIUM CHLORIDE, PRESERVATIVE FREE 10 ML: 5 INJECTION INTRAVENOUS at 09:31

## 2020-08-09 RX ADMIN — PANTOPRAZOLE SODIUM 40 MG: 40 INJECTION, POWDER, FOR SOLUTION INTRAVENOUS at 09:28

## 2020-08-09 RX ADMIN — LEVETIRACETAM 1000 MG: 100 INJECTION, SOLUTION INTRAVENOUS at 02:53

## 2020-08-09 RX ADMIN — Medication 5000 UNITS: at 09:28

## 2020-08-09 ASSESSMENT — PAIN DESCRIPTION - DESCRIPTORS: DESCRIPTORS: ACHING;CONSTANT;STABBING

## 2020-08-09 ASSESSMENT — PAIN SCALES - GENERAL
PAINLEVEL_OUTOF10: 7
PAINLEVEL_OUTOF10: 9

## 2020-08-09 ASSESSMENT — PAIN DESCRIPTION - FREQUENCY: FREQUENCY: CONTINUOUS

## 2020-08-09 ASSESSMENT — PAIN DESCRIPTION - PROGRESSION: CLINICAL_PROGRESSION: NOT CHANGED

## 2020-08-09 ASSESSMENT — PAIN DESCRIPTION - LOCATION: LOCATION: ABDOMEN;GENERALIZED

## 2020-08-09 ASSESSMENT — PAIN - FUNCTIONAL ASSESSMENT: PAIN_FUNCTIONAL_ASSESSMENT: ACTIVITIES ARE NOT PREVENTED

## 2020-08-09 ASSESSMENT — PAIN DESCRIPTION - ONSET: ONSET: ON-GOING

## 2020-08-09 ASSESSMENT — PAIN DESCRIPTION - PAIN TYPE: TYPE: ACUTE PAIN;CHRONIC PAIN

## 2020-08-09 NOTE — FLOWSHEET NOTE
Ate 100% of 2 trays. One episode of emesis-whole food, no blood. Recd anti-emetic and then asked for jose miguel crackers and milk-ate 100%. Ambulated well to restroom for shower without difficulty.

## 2020-08-09 NOTE — DISCHARGE SUMMARY
Patient: Nisa Garcia MD      Gender: female  : 1970   Age: 44 y.o. MRN: [id number]    Admitting Physician: Lynne Bishop MD  Discharge Physician: Lynne Bishop MD     Code Status: Full Code     Admit Date: 2020   Discharge Date: 20      Disposition:  Home       Condition at Discharge:  stable . Follow-up appointments:  f/u one week with PCP , and with consultants as recommended . Outpatient to do list: f/u       Discharge Diagnoses: Active Hospital Problems    Diagnosis    Seizure [facility]) [R56.9]   h/o pseudoseizure   S/p gastric sleep   Morbid obesity   Anxiety   Chronic abdominal pain   H/o chronic pancreatitis   Questionable hematemesis        History of Present Illness:  Andre Pope is a 49 y.o. female with morbid obesity , Anxiety , h/o Upper GI bleed few years ago , s/p gastric sleeve on 2019 with mild gastritis , multiple  EGD  History of pancreatitis , Pt had an EGD and non acute abdomen CT on  and also she was admitted for possible seizure after EGD and was discharged on Keppra and pt was diagnosed with pseudo seizure before and was seen and followed by neurologist .  Pt presented to ER for evaluation for seizure episode at home and she had another episode and it was witnesed by ER doctor . Also pt has  worsening abdominal pain and nausea and vomiting . In ER lab data non specific . History obtained from patient .                Hospital Course:   Tele   IVF   Cont Keppra , neuro consult , seizure precautions . [facility] Brain MRI : neg . . plan for outpt seizure workup  , seizure precautions   GI consult , monitor H/H , Advance diet tolerated  . .. no vomiting and tolerating PO   Home meds , reviewed and resumed as appropriate   Symptoms releif/Pain control  Home meds , reviewed and resumed as appropriate   Symptoms releif/Pain control  DVT proph      Consults.   IP CONSULT TO PRIMARY CARE PROVIDER  IP CONSULT TO NEUROLOGY  IP CONSULT TO GI  IP CONSULT TO GI  IP CONSULT TO IV TEAM        Discharge Medications:   Current Discharge Medication List        Current Discharge Medication List      CONTINUE these medications which have CHANGED    Details   ondansetron (ZOFRAN ODT) 4 MG disintegrating tablet Take 1 tablet by mouth every 8 hours as needed for Nausea or Vomiting  Qty: 30 tablet, Refills: 5           Current Discharge Medication List      CONTINUE these medications which have NOT CHANGED    Details   levETIRAcetam (KEPPRA) 1000 MG tablet Take 1 tablet by mouth 2 times daily  Qty: 60 tablet, Refills: 5      LORazepam (ATIVAN) 1 MG tablet Take 1 mg by mouth as needed for Anxiety. pantoprazole (PROTONIX) 40 MG tablet Take 1 tablet by mouth every morning (before breakfast)  Qty: 90 tablet, Refills: 1      dicyclomine (BENTYL) 10 MG capsule Take 1 capsule by mouth 3 times daily as needed (abdominal pain)  Qty: 21 capsule, Refills: 3      oxyCODONE-acetaminophen (PERCOCET) 5-325 MG per tablet Take 1 tablet by mouth.  Every 4-6 hours PRN      Cholecalciferol (VITAMIN D3) 5000 units TABS Take 1 tablet by mouth daily      estradiol (ESTRACE) 0.5 MG tablet Take 0.5 mg by mouth daily      atenolol (TENORMIN) 25 MG tablet Take 25 mg by mouth daily      Multiple Vitamin (MVI, BARIATRIC ADVANTAGE MULTI-FORMULA, CHEW TAB) Take 1 tablet by mouth daily  Qty: 30 tablet, Refills: 5      Calcium Citrate-Vitamin D (CALCIUM + VIT D, BARIATRIC ADVANTAGE, CHEWABLE TABLET) Take 1 tablet by mouth 2 times daily  Qty: 120 tablet, Refills: 5      levothyroxine (SYNTHROID) 125 MCG tablet Take 1 tablet by mouth Daily  Qty: 30 tablet, Refills: 0      gabapentin (NEURONTIN) 800 MG tablet Take 800 mg by mouth 3 times daily      PARoxetine (PAXIL) 30 MG tablet Take 50 mg by mouth nightly            Current Discharge Medication List          Discharge ROS:  A complete review of systems was asked and negative except for abd discomfort      Discharge Exam:    /71   Pulse 73   Temp 98.9 °F (37.2 °C) (Oral)   Resp 20   Ht 5' 4\" (1.626 m)   Wt 281 lb (127.5 kg)   SpO2 99%   BMI 48.23 kg/m²   General appearance:  NAD  Heart[de-identified] Normal s1/s2, RRR, no murmurs, gallops, or rubs. No leg edema  Lungs:  Clear to auscultation, bilaterally without Rales/Wheezes/Rhonchi. Abdomen: Soft, non-tender, non-distended, bowel sounds present  Musculoskeletal:   no cyanosis, no edema  Neurologic:  Cranial nerves: II-XII intact, grossly non-focal.  Psychiatric:  A & O x3      Labs: For convenience and continuity at follow-up the following most recent labs are provided:    Lab Results   Component Value Date    WBC 5.5 08/09/2020    HGB 9.9 08/09/2020    HCT 32.4 08/09/2020    MCV 86.2 08/09/2020     08/09/2020     08/07/2020    K 4.7 08/07/2020     08/07/2020    CO2 22 08/07/2020    BUN 14 08/07/2020    CREATININE 0.7 08/07/2020    CALCIUM 10.3 08/07/2020    PHOS 4.4 12/04/2015    BNP 88 11/26/2010    ALKPHOS 111 08/07/2020    ALT 21 08/07/2020    AST 17 08/07/2020    BILITOT 0.1 08/07/2020    BILIDIR 0.2 01/02/2016    LABALBU 4.1 08/07/2020    LABALBU 8 11/18/2015    TRIG 161 07/23/2015     Lab Results   Component Value Date    INR 0.93 08/07/2020    INR 0.93 01/10/2020    INR ? 01/09/2020           Chart review shows recent radiographs:  Ct Head Wo Contrast    Result Date: 7/17/2020  EXAMINATION: CT OF THE HEAD WITHOUT CONTRAST  7/17/2020 6:34 pm TECHNIQUE: CT of the head was performed without the administration of intravenous contrast. Dose modulation, iterative reconstruction, and/or weight based adjustment of the mA/kV was utilized to reduce the radiation dose to as low as reasonably achievable. COMPARISON: None. HISTORY: ORDERING SYSTEM PROVIDED HISTORY: seizure TECHNOLOGIST PROVIDED HISTORY: Reason for exam:->seizure Has a \"code stroke\" or \"stroke alert\" been called? ->No Is the patient pregnant?->No Reason for Exam: seizure Acuity: Acute Type of Exam: Initial Additional signs and symptoms: pt came today for outpt EGD procedure,began to have seizure then got admitted FINDINGS: BRAIN/VENTRICLES: There is no acute intracranial hemorrhage, mass effect or midline shift. No abnormal extra-axial fluid collection. The gray-white differentiation is maintained without evidence of an acute infarct. There is no evidence of hydrocephalus. ORBITS: The visualized portion of the orbits demonstrate no acute abnormality. SINUSES: The visualized paranasal sinuses and mastoid air cells demonstrate no acute abnormality. SOFT TISSUES/SKULL:  No acute abnormality of the visualized skull or soft tissues. No acute intracranial abnormality. Ct Abdomen Pelvis W Iv Contrast Additional Contrast? None    Result Date: 7/21/2020  EXAMINATION: CT OF THE ABDOMEN AND PELVIS WITH CONTRAST 7/21/2020 1:07 am TECHNIQUE: CT of the abdomen and pelvis was performed with the administration of intravenous contrast. Multiplanar reformatted images are provided for review. Dose modulation, iterative reconstruction, and/or weight based adjustment of the mA/kV was utilized to reduce the radiation dose to as low as reasonably achievable. COMPARISON: None HISTORY: ORDERING SYSTEM PROVIDED HISTORY: ruq pain TECHNOLOGIST PROVIDED HISTORY: IV contrast only. Thank you. Additional Contrast?->None Reason for exam:->ruq pain Reason for Exam: pain under right breast area/ sob FINDINGS: Lower Chest: There are no focal consolidations. Stable pulmonary nodules are seen. These are likely benign and no follow-up is indicated. Organs: Pneumobilia is noted. There has been a cholecystectomy. Mild intrahepatic and extrahepatic bile duct dilatation is seen, likely related to post cholecystectomy status. Spleen, adrenal glands, pancreas, and kidneys are unremarkable. GI/Bowel: There is evidence of gastric sleeve surgery. There is no evidence of bowel obstruction. The appendix is not visualized. Pelvis: The bladder is unremarkable.   There has been a hysterectomy. There is no free fluid. Peritoneum/Retroperitoneum: There is no evidence of free air or lymphadenopathy. Bones/Soft Tissues: No destructive osseous lesions are identified. 1. No acute findings. 2. Pneumobilia with mild intrahepatic and extrahepatic bile duct dilatation, likely related to post cholecystectomy status and prior biliary intervention. This is not significantly changed. Xr Chest Portable    Result Date: 8/5/2020  EXAMINATION: ONE XRAY VIEW OF THE CHEST 8/5/2020 12:45 pm COMPARISON: July 20, 2020 HISTORY: ORDERING SYSTEM PROVIDED HISTORY: dizziness, sob TECHNOLOGIST PROVIDED HISTORY: Reason for exam:->dizziness, sob Reason for Exam: sob, dizziness Initial evaluation, acute shortness of breath, dizziness FINDINGS: The cardiomediastinal silhouette is moderately enlarged. Lung volumes are low. There is mild pulmonary venous congestion. Left-sided port is noted, with distal tip projecting over the SVC. There is some kinking of the catheter noted in the subclavian region. Lungs are clear with exception of mild scarring at the left costophrenic angle. No pleural effusion or pneumothorax. Cardiomegaly with mild congestive changes. Xr Chest Portable    Result Date: 7/20/2020  EXAMINATION: ONE XRAY VIEW OF THE CHEST 7/20/2020 10:30 pm COMPARISON: Chest 07/03/2020 HISTORY: Acute chest pain FINDINGS: The cardiomediastinal and hilar silhouettes appear unremarkable. The left lung appears clear. No pleural effusion evident. Unchanged curvilinear density lateral lower left lung partially obscuring the left hemidiaphragm. No pneumothorax is seen. No acute osseous abnormality is identified. Left subclavian Port-A-Cath with prominent kink at the left subclavian level. Atelectasis or scarring lateral lower left lung appears unchanged from prior studies. Left subclavian Port-A-Cath with prominent kink at the left subclavian level.      Cta Pulmonary W Contrast    Result Date: 7/21/2020  EXAMINATION: CTA OF THE CHEST 7/21/2020 1:07 am TECHNIQUE: CTA of the chest was performed after the administration of intravenous contrast.  Multiplanar reformatted images are provided for review. MIP images are provided for review. Dose modulation, iterative reconstruction, and/or weight based adjustment of the mA/kV was utilized to reduce the radiation dose to as low as reasonably achievable. COMPARISON: None. HISTORY: ORDERING SYSTEM PROVIDED HISTORY: chest pain TECHNOLOGIST PROVIDED HISTORY: Reason for exam:->chest pain Reason for Exam: pain under right breast area/ sob FINDINGS: Pulmonary Arteries: Pulmonary arteries are adequately opacified for evaluation. No evidence of intraluminal filling defect to suggest pulmonary embolism. Main pulmonary artery is normal in caliber. Mediastinum: No evidence of mediastinal lymphadenopathy. The heart and pericardium demonstrate no acute abnormality. There is no acute abnormality of the thoracic aorta. Lungs/pleura: The lungs are without acute process. No focal consolidation or pulmonary edema. No evidence of pleural effusion or pneumothorax. Upper Abdomen: Limited images of the upper abdomen are unremarkable. Soft Tissues/Bones: No acute bone or soft tissue abnormality. No evidence of pulmonary embolism or acute pulmonary abnormality. Mri Brain W Wo Contrast    Result Date: 8/6/2020  EXAMINATION: MRI OF THE BRAIN WITHOUT AND WITH CONTRAST  8/6/2020 5:32 pm TECHNIQUE: Multiplanar multisequence MRI of the head/brain was performed without and with the administration of intravenous contrast. COMPARISON: None.  HISTORY: ORDERING SYSTEM PROVIDED HISTORY: seizure, headaches TECHNOLOGIST PROVIDED HISTORY: Reason for exam:->seizure, headaches Is the patient pregnant?->No Reason for Exam: seizure, headaches Acuity: Acute Type of Exam: Initial Additional signs and symptoms: none Relevant Medical/Surgical History: 14ml multihance// gfr>60 FINDINGS: Motion degrades images limiting evaluation. INTRACRANIAL STRUCTURES/VENTRICLES:  There is no acute infarct. The hippocampal structures are symmetric. No abnormal T2 FLAIR signal within the medial temporal lobes. No mass effect or midline shift. No evidence of an acute intracranial hemorrhage. The ventricles and sulci are normal in size and configuration. The sellar/suprasellar regions appear unremarkable. The normal signal voids within the major intracranial vessels appear maintained. No abnormal focus of enhancement is seen within the brain. ORBITS: The visualized portion of the orbits demonstrate no acute abnormality. SINUSES: The visualized paranasal sinuses and mastoid air cells are well aerated. BONES/SOFT TISSUES: The bone marrow signal intensity appears normal. The soft tissues demonstrate no acute abnormality. No acute intracranial abnormality. No acute infarct. EKG     Rhythm: normal sinus   Rate: normal  Clinical Impression: no acute changes        The patient was seen and examined on day of discharge and this discharge summary is in conjunction with any daily progress note from day of discharge. Time Spent on discharge is   >35  min  in the examination, evaluation, counseling and review of medications and discharge plan.             Signed:    Tonita Buerger, MD   8/9/2020

## 2020-08-09 NOTE — PROGRESS NOTES
Attending Progress Note      PCP: Rashard Sanders MD    Patient: Parmjit Mendoza   Gender: female  : 1970   Age: 44 y.o. MRN: [id number]      Date of Admission: 2020    Chief Complaint:   Chief Complaint   Patient presents with    Seizures    Abdominal Pain           Subjective:  Still has  abd pain and nausea , vomiting . . will very questionable hematemesis . ... d/w pt and nurse that pt will make nurse aware for each bloddy vomitus . .and the need to check it for  occult blood         Medications:  Reviewed  Infusion Medications    dextrose 5 % and 0.45 % NaCl 100 mL/hr at 20 1456    sodium chloride 75 mL/hr at 20 1826     Scheduled Medications    pantoprazole  40 mg Intravenous Daily    tiZANidine  6 mg Oral Nightly    levetiracetam  1,000 mg Intravenous Q12H    PARoxetine  50 mg Oral Nightly    gabapentin  800 mg Oral TID    levothyroxine  125 mcg Oral Daily    CENTRUM/CERTA-LILLY with minerals oral  15 mL Oral Daily    calcium carbonate  500 mg Oral BID    estradiol  0.5 mg Oral Daily    atenolol  25 mg Oral Daily    sodium chloride flush  10 mL Intravenous 2 times per day    nicotine  1 patch Transdermal Daily    enoxaparin  40 mg Subcutaneous Daily    guaiFENesin  1,200 mg Oral BID    vitamin D  5,000 Int'l Units Oral Daily     PRN Meds: morphine, promethazine, oxyCODONE-acetaminophen, dicyclomine, LORazepam, ondansetron, sodium chloride flush, potassium chloride **OR** potassium alternative oral replacement **OR** potassium chloride, magnesium sulfate, acetaminophen **OR** acetaminophen, polyethylene glycol, fleet, [DISCONTINUED] promethazine **OR** ondansetron, zolpidem, benzonatate, calcium carbonate, ondansetron    No intake or output data in the 24 hours ending 20    Exam:  BP (!) 146/73   Pulse 81   Temp 98 °F (36.7 °C) (Oral)   Resp 20   Ht 5' 4\" (1.626 m)   Wt 286 lb 14.4 oz (130.1 kg)   SpO2 95%   BMI 49.25 kg/m²   General appearance: No distress,   Respiratory:  good air entry , no Rales , No wheezing, or rhonchi,  Cardiovascular: RRR, with normal S1/S2 . Abdomen : Soft, tender, non-distended  , normal bowel sounds. Legs : No edema bilaterally. No DVT signs ,    Neurologic:  Alert and oriented ,weak         Labs:   Recent Labs     08/06/20 0200 08/07/20 0600   WBC 5.6 5.9   HGB 11.6* 10.9*   HCT 37.1 35.7*    211     Recent Labs     08/06/20 0200 08/07/20 0600    140   K 4.1 4.7    107   CO2 26 22   BUN 15 14   CREATININE 0.8 0.7   CALCIUM 10.1 10.3     Recent Labs     08/07/20 0600   AST 17   ALT 21   BILITOT 0.1   ALKPHOS 111     Recent Labs     08/07/20 0600   INR 0.93     No results for input(s): CKTOTAL, TROPONINI in the last 72 hours. Assessment/Plan:    Active Hospital Problems    Diagnosis Date Noted    Seizure [facility]) [R56.9] 07/17/2020     h/o pseudoseizure   S/p gastric sleep 2019  Morbid obesity   Anxiety   Chronic abdominal pain   H/o chronic pancreatitis   Questionable hematemesis         Assessment/Plan:   Advance diet . .. hopefully home tomorrow ,if no vomiting and tolerating PO    d/w pt and nurse that pt will make nurse aware for each bloddy vomitus . .and the need to check it for  occult blood   Tele - no event   IVF   Cont Keppra , neuro input noted . [facility] Brain MRI : neg . . plan for outpt seizure workup  , seizure precautions   GI input noted , monitor H/H stable ,  Home meds , reviewed and resumed as appropriate   Symptoms releif/Pain control  DVT proph   DVT Prophylaxis   Diet: DIET GENERAL;  Code Status: Full Code    Treatment progress and plan was d/w pt/family .         Michael Cummings MD

## 2020-08-09 NOTE — FLOWSHEET NOTE
Pt notified this RN of \"bloody emesis\". Upon inspection, emesis was bright red and this RN noticed blood and air backed up in port lock and IV tubing and IV pump was off. Mediport no longer flushed or blood return. Not in any distress and asked for milk, peanut butter, and jose miguel crackers. Drank a total of 8 milks and milk was not present in emesis. Occult card sample sent to lab, when collecting sample emesis appeared diluted with saline.

## 2020-08-10 LAB
EKG ATRIAL RATE: 52 BPM
EKG DIAGNOSIS: NORMAL
EKG P AXIS: 44 DEGREES
EKG P-R INTERVAL: 200 MS
EKG Q-T INTERVAL: 428 MS
EKG QRS DURATION: 94 MS
EKG QTC CALCULATION (BAZETT): 398 MS
EKG R AXIS: -17 DEGREES
EKG T AXIS: 13 DEGREES
EKG VENTRICULAR RATE: 52 BPM

## 2020-08-17 ENCOUNTER — HOSPITAL ENCOUNTER (INPATIENT)
Age: 50
LOS: 4 days | Discharge: HOME | DRG: 092 | End: 2020-08-21
Attending: FAMILY MEDICINE | Admitting: FAMILY MEDICINE
Payer: COMMERCIAL

## 2020-08-17 ENCOUNTER — APPOINTMENT (OUTPATIENT)
Dept: CT IMAGING | Age: 50
DRG: 092 | End: 2020-08-17
Payer: COMMERCIAL

## 2020-08-17 PROBLEM — F41.9 ANXIETY: Status: ACTIVE | Noted: 2020-08-17

## 2020-08-17 LAB
ALBUMIN SERPL-MCNC: 3.9 GM/DL (ref 3.4–5)
ALP BLD-CCNC: 97 IU/L (ref 40–129)
ALT SERPL-CCNC: 22 U/L (ref 10–40)
AMPHETAMINES: NEGATIVE
ANION GAP SERPL CALCULATED.3IONS-SCNC: 9 MMOL/L (ref 4–16)
AST SERPL-CCNC: 19 IU/L (ref 15–37)
BACTERIA: NEGATIVE /HPF
BARBITURATE SCREEN URINE: NEGATIVE
BASOPHILS ABSOLUTE: 0 K/CU MM
BASOPHILS RELATIVE PERCENT: 0.8 % (ref 0–1)
BENZODIAZEPINE SCREEN, URINE: NEGATIVE
BILIRUB SERPL-MCNC: 0.2 MG/DL (ref 0–1)
BILIRUBIN URINE: NEGATIVE MG/DL
BLOOD, URINE: NEGATIVE
BUN BLDV-MCNC: 16 MG/DL (ref 6–23)
CALCIUM SERPL-MCNC: 10 MG/DL (ref 8.3–10.6)
CANNABINOID SCREEN URINE: NEGATIVE
CHLORIDE BLD-SCNC: 107 MMOL/L (ref 99–110)
CLARITY: CLEAR
CO2: 22 MMOL/L (ref 21–32)
COCAINE METABOLITE: NEGATIVE
COLOR: YELLOW
CREAT SERPL-MCNC: 0.8 MG/DL (ref 0.6–1.1)
DIFFERENTIAL TYPE: ABNORMAL
EOSINOPHILS ABSOLUTE: 0.2 K/CU MM
EOSINOPHILS RELATIVE PERCENT: 4 % (ref 0–3)
GFR AFRICAN AMERICAN: >60 ML/MIN/1.73M2
GFR NON-AFRICAN AMERICAN: >60 ML/MIN/1.73M2
GLUCOSE BLD-MCNC: 100 MG/DL (ref 70–99)
GLUCOSE, URINE: NEGATIVE MG/DL
HCT VFR BLD CALC: 32.5 % (ref 37–47)
HEMOGLOBIN: 10.4 GM/DL (ref 12.5–16)
IMMATURE NEUTROPHIL %: 0.3 % (ref 0–0.43)
KETONES, URINE: NEGATIVE MG/DL
LACTATE: 0.7 MMOL/L (ref 0.4–2)
LEUKOCYTE ESTERASE, URINE: NEGATIVE
LIPASE: 16 IU/L (ref 13–60)
LYMPHOCYTES ABSOLUTE: 1.4 K/CU MM
LYMPHOCYTES RELATIVE PERCENT: 35 % (ref 24–44)
MAGNESIUM: 2.2 MG/DL (ref 1.8–2.4)
MCH RBC QN AUTO: 27.2 PG (ref 27–31)
MCHC RBC AUTO-ENTMCNC: 32 % (ref 32–36)
MCV RBC AUTO: 85.1 FL (ref 78–100)
MONOCYTES ABSOLUTE: 0.3 K/CU MM
MONOCYTES RELATIVE PERCENT: 8.1 % (ref 0–4)
MUCUS: ABNORMAL HPF
NITRITE URINE, QUANTITATIVE: NEGATIVE
NUCLEATED RBC %: 0 %
OPIATES, URINE: NEGATIVE
OXYCODONE: ABNORMAL
PDW BLD-RTO: 13.8 % (ref 11.7–14.9)
PH, URINE: 6 (ref 5–8)
PHENCYCLIDINE, URINE: NEGATIVE
PLATELET # BLD: 128 K/CU MM (ref 140–440)
PMV BLD AUTO: 10.8 FL (ref 7.5–11.1)
POTASSIUM SERPL-SCNC: 4.2 MMOL/L (ref 3.5–5.1)
PROTEIN UA: NEGATIVE MG/DL
RBC # BLD: 3.82 M/CU MM (ref 4.2–5.4)
RBC URINE: ABNORMAL /HPF (ref 0–6)
SEGMENTED NEUTROPHILS ABSOLUTE COUNT: 2.1 K/CU MM
SEGMENTED NEUTROPHILS RELATIVE PERCENT: 51.8 % (ref 36–66)
SODIUM BLD-SCNC: 138 MMOL/L (ref 135–145)
SPECIFIC GRAVITY UA: 1.01 (ref 1–1.03)
SQUAMOUS EPITHELIAL: 5 /HPF
TOTAL IMMATURE NEUTOROPHIL: 0.01 K/CU MM
TOTAL NUCLEATED RBC: 0 K/CU MM
TOTAL PROTEIN: 6.2 GM/DL (ref 6.4–8.2)
TOTAL RETICULOCYTE COUNT: 0.06 K/CU MM
TRICHOMONAS: ABNORMAL /HPF
TROPONIN T: <0.01 NG/ML
UROBILINOGEN, URINE: NORMAL MG/DL (ref 0.2–1)
WBC # BLD: 4 K/CU MM (ref 4–10.5)
WBC UA: 1 /HPF (ref 0–5)

## 2020-08-17 PROCEDURE — 2580000003 HC RX 258: Performed by: FAMILY MEDICINE

## 2020-08-17 PROCEDURE — 93005 ELECTROCARDIOGRAM TRACING: CPT | Performed by: PHYSICIAN ASSISTANT

## 2020-08-17 PROCEDURE — 96376 TX/PRO/DX INJ SAME DRUG ADON: CPT

## 2020-08-17 PROCEDURE — 83690 ASSAY OF LIPASE: CPT

## 2020-08-17 PROCEDURE — 93010 ELECTROCARDIOGRAM REPORT: CPT | Performed by: INTERNAL MEDICINE

## 2020-08-17 PROCEDURE — 94761 N-INVAS EAR/PLS OXIMETRY MLT: CPT

## 2020-08-17 PROCEDURE — 6360000002 HC RX W HCPCS: Performed by: PHYSICIAN ASSISTANT

## 2020-08-17 PROCEDURE — 96374 THER/PROPH/DIAG INJ IV PUSH: CPT

## 2020-08-17 PROCEDURE — 2580000003 HC RX 258: Performed by: PHYSICIAN ASSISTANT

## 2020-08-17 PROCEDURE — 80307 DRUG TEST PRSMV CHEM ANLYZR: CPT

## 2020-08-17 PROCEDURE — 83605 ASSAY OF LACTIC ACID: CPT

## 2020-08-17 PROCEDURE — 85025 COMPLETE CBC W/AUTO DIFF WBC: CPT

## 2020-08-17 PROCEDURE — 84484 ASSAY OF TROPONIN QUANT: CPT

## 2020-08-17 PROCEDURE — 96375 TX/PRO/DX INJ NEW DRUG ADDON: CPT

## 2020-08-17 PROCEDURE — 96361 HYDRATE IV INFUSION ADD-ON: CPT

## 2020-08-17 PROCEDURE — C9113 INJ PANTOPRAZOLE SODIUM, VIA: HCPCS | Performed by: PHYSICIAN ASSISTANT

## 2020-08-17 PROCEDURE — 80053 COMPREHEN METABOLIC PANEL: CPT

## 2020-08-17 PROCEDURE — 99285 EMERGENCY DEPT VISIT HI MDM: CPT

## 2020-08-17 PROCEDURE — 81001 URINALYSIS AUTO W/SCOPE: CPT

## 2020-08-17 PROCEDURE — 1200000000 HC SEMI PRIVATE

## 2020-08-17 PROCEDURE — 6360000004 HC RX CONTRAST MEDICATION: Performed by: PHYSICIAN ASSISTANT

## 2020-08-17 PROCEDURE — 74177 CT ABD & PELVIS W/CONTRAST: CPT

## 2020-08-17 PROCEDURE — 83735 ASSAY OF MAGNESIUM: CPT

## 2020-08-17 RX ORDER — ONDANSETRON 2 MG/ML
4 INJECTION INTRAMUSCULAR; INTRAVENOUS EVERY 6 HOURS PRN
Status: DISCONTINUED | OUTPATIENT
Start: 2020-08-17 | End: 2020-08-22 | Stop reason: HOSPADM

## 2020-08-17 RX ORDER — ESTRADIOL 1 MG/1
0.5 TABLET ORAL DAILY
Status: DISCONTINUED | OUTPATIENT
Start: 2020-08-18 | End: 2020-08-22 | Stop reason: HOSPADM

## 2020-08-17 RX ORDER — TIZANIDINE 4 MG/1
4 TABLET ORAL EVERY 8 HOURS PRN
Status: DISCONTINUED | OUTPATIENT
Start: 2020-08-17 | End: 2020-08-22 | Stop reason: HOSPADM

## 2020-08-17 RX ORDER — SODIUM CHLORIDE 0.9 % (FLUSH) 0.9 %
10 SYRINGE (ML) INJECTION 2 TIMES DAILY
Status: DISCONTINUED | OUTPATIENT
Start: 2020-08-18 | End: 2020-08-22 | Stop reason: HOSPADM

## 2020-08-17 RX ORDER — LORAZEPAM 1 MG/1
1 TABLET ORAL DAILY PRN
Status: DISCONTINUED | OUTPATIENT
Start: 2020-08-17 | End: 2020-08-21

## 2020-08-17 RX ORDER — ONDANSETRON 4 MG/1
4 TABLET, ORALLY DISINTEGRATING ORAL EVERY 8 HOURS PRN
Status: DISCONTINUED | OUTPATIENT
Start: 2020-08-17 | End: 2020-08-18

## 2020-08-17 RX ORDER — FAMOTIDINE 20 MG/1
20 TABLET, FILM COATED ORAL 2 TIMES DAILY
Status: DISCONTINUED | OUTPATIENT
Start: 2020-08-17 | End: 2020-08-20

## 2020-08-17 RX ORDER — MORPHINE SULFATE 2 MG/ML
2 INJECTION, SOLUTION INTRAMUSCULAR; INTRAVENOUS EVERY 4 HOURS PRN
Status: DISCONTINUED | OUTPATIENT
Start: 2020-08-17 | End: 2020-08-20

## 2020-08-17 RX ORDER — PANTOPRAZOLE SODIUM 40 MG/10ML
40 INJECTION, POWDER, LYOPHILIZED, FOR SOLUTION INTRAVENOUS ONCE
Status: COMPLETED | OUTPATIENT
Start: 2020-08-17 | End: 2020-08-17

## 2020-08-17 RX ORDER — PROMETHAZINE HYDROCHLORIDE 25 MG/ML
12.5 INJECTION, SOLUTION INTRAMUSCULAR; INTRAVENOUS ONCE
Status: COMPLETED | OUTPATIENT
Start: 2020-08-17 | End: 2020-08-17

## 2020-08-17 RX ORDER — DICYCLOMINE HYDROCHLORIDE 10 MG/1
10 CAPSULE ORAL 3 TIMES DAILY PRN
Status: DISCONTINUED | OUTPATIENT
Start: 2020-08-17 | End: 2020-08-22 | Stop reason: HOSPADM

## 2020-08-17 RX ORDER — POLYETHYLENE GLYCOL 3350 17 G/17G
17 POWDER, FOR SOLUTION ORAL DAILY PRN
Status: DISCONTINUED | OUTPATIENT
Start: 2020-08-17 | End: 2020-08-22 | Stop reason: HOSPADM

## 2020-08-17 RX ORDER — PROMETHAZINE HYDROCHLORIDE 25 MG/1
12.5 TABLET ORAL EVERY 6 HOURS PRN
Status: DISCONTINUED | OUTPATIENT
Start: 2020-08-17 | End: 2020-08-19

## 2020-08-17 RX ORDER — LEVOTHYROXINE SODIUM 0.12 MG/1
125 TABLET ORAL DAILY
Status: DISCONTINUED | OUTPATIENT
Start: 2020-08-18 | End: 2020-08-22 | Stop reason: HOSPADM

## 2020-08-17 RX ORDER — SODIUM CHLORIDE 0.9 % (FLUSH) 0.9 %
10 SYRINGE (ML) INJECTION PRN
Status: DISCONTINUED | OUTPATIENT
Start: 2020-08-17 | End: 2020-08-22 | Stop reason: HOSPADM

## 2020-08-17 RX ORDER — ACETAMINOPHEN 325 MG/1
650 TABLET ORAL EVERY 6 HOURS PRN
Status: DISCONTINUED | OUTPATIENT
Start: 2020-08-17 | End: 2020-08-22 | Stop reason: HOSPADM

## 2020-08-17 RX ORDER — GABAPENTIN 400 MG/1
800 CAPSULE ORAL 3 TIMES DAILY
Status: DISCONTINUED | OUTPATIENT
Start: 2020-08-17 | End: 2020-08-21

## 2020-08-17 RX ORDER — M-VIT,TX,IRON,MINS/CALC/FOLIC 27MG-0.4MG
1 TABLET ORAL DAILY
Status: DISCONTINUED | OUTPATIENT
Start: 2020-08-18 | End: 2020-08-22 | Stop reason: HOSPADM

## 2020-08-17 RX ORDER — LEVETIRACETAM 500 MG/1
1000 TABLET ORAL 2 TIMES DAILY
Status: DISCONTINUED | OUTPATIENT
Start: 2020-08-17 | End: 2020-08-22 | Stop reason: HOSPADM

## 2020-08-17 RX ORDER — ONDANSETRON 2 MG/ML
4 INJECTION INTRAMUSCULAR; INTRAVENOUS ONCE
Status: COMPLETED | OUTPATIENT
Start: 2020-08-17 | End: 2020-08-17

## 2020-08-17 RX ORDER — ZOLPIDEM TARTRATE 5 MG/1
5 TABLET ORAL NIGHTLY PRN
Status: DISCONTINUED | OUTPATIENT
Start: 2020-08-17 | End: 2020-08-21

## 2020-08-17 RX ORDER — GUAIFENESIN 600 MG/1
1200 TABLET, EXTENDED RELEASE ORAL 2 TIMES DAILY
Status: DISCONTINUED | OUTPATIENT
Start: 2020-08-17 | End: 2020-08-22 | Stop reason: HOSPADM

## 2020-08-17 RX ORDER — MORPHINE SULFATE 4 MG/ML
4 INJECTION, SOLUTION INTRAMUSCULAR; INTRAVENOUS ONCE
Status: COMPLETED | OUTPATIENT
Start: 2020-08-17 | End: 2020-08-17

## 2020-08-17 RX ORDER — ACETAMINOPHEN 325 MG/1
650 TABLET ORAL EVERY 4 HOURS PRN
Status: DISCONTINUED | OUTPATIENT
Start: 2020-08-17 | End: 2020-08-17

## 2020-08-17 RX ORDER — PANTOPRAZOLE SODIUM 40 MG/1
40 TABLET, DELAYED RELEASE ORAL
Status: DISCONTINUED | OUTPATIENT
Start: 2020-08-18 | End: 2020-08-20

## 2020-08-17 RX ORDER — 0.9 % SODIUM CHLORIDE 0.9 %
1000 INTRAVENOUS SOLUTION INTRAVENOUS ONCE
Status: COMPLETED | OUTPATIENT
Start: 2020-08-17 | End: 2020-08-17

## 2020-08-17 RX ORDER — ONDANSETRON 2 MG/ML
4 INJECTION INTRAMUSCULAR; INTRAVENOUS EVERY 8 HOURS PRN
Status: DISCONTINUED | OUTPATIENT
Start: 2020-08-17 | End: 2020-08-17

## 2020-08-17 RX ORDER — SODIUM CHLORIDE 9 MG/ML
INJECTION, SOLUTION INTRAVENOUS CONTINUOUS
Status: DISCONTINUED | OUTPATIENT
Start: 2020-08-17 | End: 2020-08-17

## 2020-08-17 RX ORDER — POTASSIUM CHLORIDE 20 MEQ/1
40 TABLET, EXTENDED RELEASE ORAL PRN
Status: DISCONTINUED | OUTPATIENT
Start: 2020-08-17 | End: 2020-08-22 | Stop reason: HOSPADM

## 2020-08-17 RX ORDER — SODIUM PHOSPHATE, DIBASIC AND SODIUM PHOSPHATE, MONOBASIC 7; 19 G/133ML; G/133ML
1 ENEMA RECTAL DAILY PRN
Status: DISCONTINUED | OUTPATIENT
Start: 2020-08-17 | End: 2020-08-22 | Stop reason: HOSPADM

## 2020-08-17 RX ORDER — PROMETHAZINE HYDROCHLORIDE 25 MG/ML
INJECTION, SOLUTION INTRAMUSCULAR; INTRAVENOUS
Status: DISPENSED
Start: 2020-08-17 | End: 2020-08-18

## 2020-08-17 RX ORDER — PAROXETINE HYDROCHLORIDE 20 MG/1
50 TABLET, FILM COATED ORAL NIGHTLY
Status: DISCONTINUED | OUTPATIENT
Start: 2020-08-17 | End: 2020-08-22 | Stop reason: HOSPADM

## 2020-08-17 RX ORDER — FENTANYL CITRATE 50 UG/ML
50 INJECTION, SOLUTION INTRAMUSCULAR; INTRAVENOUS ONCE
Status: COMPLETED | OUTPATIENT
Start: 2020-08-17 | End: 2020-08-17

## 2020-08-17 RX ORDER — MAGNESIUM SULFATE IN WATER 40 MG/ML
2 INJECTION, SOLUTION INTRAVENOUS PRN
Status: DISCONTINUED | OUTPATIENT
Start: 2020-08-17 | End: 2020-08-22 | Stop reason: HOSPADM

## 2020-08-17 RX ORDER — MORPHINE SULFATE 4 MG/ML
INJECTION, SOLUTION INTRAMUSCULAR; INTRAVENOUS
Status: DISPENSED
Start: 2020-08-17 | End: 2020-08-18

## 2020-08-17 RX ORDER — ATENOLOL 25 MG/1
25 TABLET ORAL DAILY
Status: DISCONTINUED | OUTPATIENT
Start: 2020-08-18 | End: 2020-08-22 | Stop reason: HOSPADM

## 2020-08-17 RX ORDER — POTASSIUM CHLORIDE 7.45 MG/ML
10 INJECTION INTRAVENOUS PRN
Status: DISCONTINUED | OUTPATIENT
Start: 2020-08-17 | End: 2020-08-22 | Stop reason: HOSPADM

## 2020-08-17 RX ORDER — SODIUM CHLORIDE 9 MG/ML
INJECTION, SOLUTION INTRAVENOUS CONTINUOUS
Status: DISCONTINUED | OUTPATIENT
Start: 2020-08-17 | End: 2020-08-22 | Stop reason: HOSPADM

## 2020-08-17 RX ORDER — ACETAMINOPHEN 650 MG/1
650 SUPPOSITORY RECTAL EVERY 6 HOURS PRN
Status: DISCONTINUED | OUTPATIENT
Start: 2020-08-17 | End: 2020-08-22 | Stop reason: HOSPADM

## 2020-08-17 RX ORDER — OXYCODONE HYDROCHLORIDE AND ACETAMINOPHEN 5; 325 MG/1; MG/1
1 TABLET ORAL EVERY 6 HOURS PRN
Status: DISCONTINUED | OUTPATIENT
Start: 2020-08-17 | End: 2020-08-20

## 2020-08-17 RX ORDER — CALCIUM CARBONATE 200(500)MG
750 TABLET,CHEWABLE ORAL 3 TIMES DAILY PRN
Status: DISCONTINUED | OUTPATIENT
Start: 2020-08-17 | End: 2020-08-22 | Stop reason: HOSPADM

## 2020-08-17 RX ORDER — NICOTINE 21 MG/24HR
1 PATCH, TRANSDERMAL 24 HOURS TRANSDERMAL DAILY
Status: DISCONTINUED | OUTPATIENT
Start: 2020-08-18 | End: 2020-08-22 | Stop reason: HOSPADM

## 2020-08-17 RX ORDER — BENZONATATE 100 MG/1
200 CAPSULE ORAL 3 TIMES DAILY PRN
Status: DISCONTINUED | OUTPATIENT
Start: 2020-08-17 | End: 2020-08-22 | Stop reason: HOSPADM

## 2020-08-17 RX ORDER — FENTANYL CITRATE 50 UG/ML
INJECTION, SOLUTION INTRAMUSCULAR; INTRAVENOUS
Status: DISPENSED
Start: 2020-08-17 | End: 2020-08-18

## 2020-08-17 RX ADMIN — PANTOPRAZOLE SODIUM 40 MG: 40 INJECTION, POWDER, FOR SOLUTION INTRAVENOUS at 14:26

## 2020-08-17 RX ADMIN — MORPHINE SULFATE 4 MG: 4 INJECTION, SOLUTION INTRAMUSCULAR; INTRAVENOUS at 13:39

## 2020-08-17 RX ADMIN — SODIUM CHLORIDE 1000 ML: 9 INJECTION, SOLUTION INTRAVENOUS at 13:40

## 2020-08-17 RX ADMIN — SODIUM CHLORIDE: 9 INJECTION, SOLUTION INTRAVENOUS at 23:38

## 2020-08-17 RX ADMIN — ONDANSETRON 4 MG: 2 INJECTION INTRAMUSCULAR; INTRAVENOUS at 13:39

## 2020-08-17 RX ADMIN — PROMETHAZINE HYDROCHLORIDE 12.5 MG: 25 INJECTION INTRAMUSCULAR; INTRAVENOUS at 14:27

## 2020-08-17 RX ADMIN — SODIUM CHLORIDE, PRESERVATIVE FREE 10 ML: 5 INJECTION INTRAVENOUS at 23:39

## 2020-08-17 RX ADMIN — PROMETHAZINE HYDROCHLORIDE 12.5 MG: 25 INJECTION INTRAMUSCULAR; INTRAVENOUS at 19:16

## 2020-08-17 RX ADMIN — FENTANYL CITRATE 50 MCG: 50 INJECTION, SOLUTION INTRAMUSCULAR; INTRAVENOUS at 19:17

## 2020-08-17 RX ADMIN — MORPHINE SULFATE 4 MG: 4 INJECTION, SOLUTION INTRAMUSCULAR; INTRAVENOUS at 16:44

## 2020-08-17 RX ADMIN — IOPAMIDOL 70 ML: 755 INJECTION, SOLUTION INTRAVENOUS at 17:11

## 2020-08-17 ASSESSMENT — PAIN SCALES - GENERAL
PAINLEVEL_OUTOF10: 6
PAINLEVEL_OUTOF10: 8
PAINLEVEL_OUTOF10: 6
PAINLEVEL_OUTOF10: 9
PAINLEVEL_OUTOF10: 8
PAINLEVEL_OUTOF10: 8

## 2020-08-17 ASSESSMENT — PAIN DESCRIPTION - PAIN TYPE
TYPE: ACUTE PAIN
TYPE: ACUTE PAIN

## 2020-08-17 ASSESSMENT — PAIN DESCRIPTION - ORIENTATION
ORIENTATION: MID;RIGHT;LEFT
ORIENTATION: LOWER

## 2020-08-17 ASSESSMENT — PAIN DESCRIPTION - LOCATION
LOCATION: ABDOMEN
LOCATION: ABDOMEN

## 2020-08-17 ASSESSMENT — PAIN DESCRIPTION - DESCRIPTORS
DESCRIPTORS: CRAMPING
DESCRIPTORS: CRAMPING

## 2020-08-17 ASSESSMENT — PAIN DESCRIPTION - FREQUENCY
FREQUENCY: CONTINUOUS
FREQUENCY: INTERMITTENT

## 2020-08-17 NOTE — CONSULTS
surgical history that includes Lung removal, partial (Left, );  section (1991); Foot surgery (Left, Last Done In ); Dental surgery; Cholecystectomy, laparoscopic (); other surgical history (1998); other surgical history (Last Done 7-15-16); Tonsillectomy and adenoidectomy (); Appendectomy (1998); Upper gastrointestinal endoscopy (2018); Lap Band (~); Hysterectomy (10/1997); Endoscopy, colon, diagnostic (Last Done In ); Colonoscopy (Last Done In ); Cardiac catheterization (10/24/2010); Sleeve Gastrectomy (N/A, 2019); hiatal hernia repair (N/A, 2019); Upper gastrointestinal endoscopy (N/A, 2019); Foot Debridement (Left, 2019); Upper gastrointestinal endoscopy (N/A, 2019); Catheter Removal (N/A, 2019); Upper gastrointestinal endoscopy (N/A, 2019); INSERTION / REMOVAL / REPLACEMENT VENOUS ACCESS CATHETER (N/A, 8/15/2019); Upper gastrointestinal endoscopy (N/A, 10/14/2019); [address] Surgery (N/A, 1/15/2020); Port Surgery (N/A, 2020); and Upper gastrointestinal endoscopy (N/A, 2020). Allergies: Allergies   Allergen Reactions    Aspirin Palpitations     \"My Heart Rate Elevates\"    Shellfish-Derived Products Swelling    Toradol [Ketorolac Tromethamine] Rash    Compazine [Prochlorperazine]     Reglan [Metoclopramide] Itching        Home Meds:    Prior to Admission medications    Medication Sig Start Date End Date Taking? Authorizing Provider   ondansetron (ZOFRAN ODT) 4 MG disintegrating tablet Take 1 tablet by mouth every 8 hours as needed for Nausea or Vomiting 20   Shanel Hurt MD   levETIRAcetam (KEPPRA) 1000 MG tablet Take 1 tablet by mouth 2 times daily 20   Shanel Hurt MD   LORazepam (ATIVAN) 1 MG tablet Take 1 mg by mouth as needed for Anxiety.     Historical Provider, MD   pantoprazole (PROTONIX) 40 MG tablet Take 1 tablet by mouth every morning (before breakfast) 20   Lisbeth Aguilar, MD   dicyclomine (BENTYL) 10 MG capsule Take 1 capsule by mouth 3 times daily as needed (abdominal pain) 4/13/20   Phil Bean [address] [place] [address], PA-   oxyCODONE-acetaminophen (PERCOCET) 5-325 MG per tablet Take 1 tablet by mouth. Every 4-6 hours PRN    Historical Provider, MD   Cholecalciferol (VITAMIN D3) 5000 units TABS Take 1 tablet by mouth daily    Historical Provider, MD   estradiol (ESTRACE) 0.5 MG tablet Take 0.5 mg by mouth daily    Historical Provider, MD   atenolol (TENORMIN) 25 MG tablet Take 25 mg by mouth daily    Historical Provider, MD   Multiple Vitamin (MVI, BARIATRIC ADVANTAGE MULTI-FORMULA, CHEW TAB) Take 1 tablet by mouth daily 2/22/19   Andreea Méndez MD   Calcium Citrate-Vitamin D (CALCIUM + VIT D, BARIATRIC ADVANTAGE, CHEWABLE TABLET) Take 1 tablet by mouth 2 times daily 2/22/19   Andreea Méndez MD   levothyroxine (SYNTHROID) 125 MCG tablet Take 1 tablet by mouth Daily  Patient taking differently: Take 125 mcg by mouth nightly  8/8/18   Presley Calderon MD   gabapentin (NEURONTIN) 800 MG tablet Take 800 mg by mouth 3 times daily    Historical Provider, MD   PARoxetine (PAXIL) 30 MG tablet Take 50 mg by mouth nightly     Historical Provider, MD        Hospital Meds:    No current facility-administered medications for this encounter. Current Outpatient Medications   Medication Sig Dispense Refill    ondansetron (ZOFRAN ODT) 4 MG disintegrating tablet Take 1 tablet by mouth every 8 hours as needed for Nausea or Vomiting 30 tablet 5    levETIRAcetam (KEPPRA) 1000 MG tablet Take 1 tablet by mouth 2 times daily 60 tablet 5    LORazepam (ATIVAN) 1 MG tablet Take 1 mg by mouth as needed for Anxiety.       pantoprazole (PROTONIX) 40 MG tablet Take 1 tablet by mouth every morning (before breakfast) 90 tablet 1    dicyclomine (BENTYL) 10 MG capsule Take 1 capsule by mouth 3 times daily as needed (abdominal pain) 21 capsule 3    oxyCODONE-acetaminophen (PERCOCET) 5-325 MG per tablet Take 1 tablet by mouth.  Every 4-6 hours PRN      Cholecalciferol (VITAMIN D3) 5000 units TABS Take 1 tablet by mouth daily      estradiol (ESTRACE) 0.5 MG tablet Take 0.5 mg by mouth daily      atenolol (TENORMIN) 25 MG tablet Take 25 mg by mouth daily      Multiple Vitamin (MVI, BARIATRIC ADVANTAGE MULTI-FORMULA, CHEW TAB) Take 1 tablet by mouth daily 30 tablet 5    Calcium Citrate-Vitamin D (CALCIUM + VIT D, BARIATRIC ADVANTAGE, CHEWABLE TABLET) Take 1 tablet by mouth 2 times daily 120 tablet 5    levothyroxine (SYNTHROID) 125 MCG tablet Take 1 tablet by mouth Daily (Patient taking differently: Take 125 mcg by mouth nightly ) 30 tablet 0    gabapentin (NEURONTIN) 800 MG tablet Take 800 mg by mouth 3 times daily      PARoxetine (PAXIL) 30 MG tablet Take 50 mg by mouth nightly          Social History / Family History:     Social History     Socioeconomic History    Marital status:      Spouse name: Not on file    Number of children: Not on file    Years of education: Not on file    Highest education level: Not on file   Occupational History    Not on file   Social Needs    Financial resource strain: Not on file    Food insecurity     Worry: Not on file     Inability: Not on file    Transportation needs     Medical: Not on file     Non-medical: Not on file   Tobacco Use    Smoking status: Never Smoker    Smokeless tobacco: Never Used   Substance and Sexual Activity    Alcohol use: No     Alcohol/week: 0.0 standard drinks    Drug use: No    Sexual activity: Not Currently   Lifestyle    Physical activity     Days per week: Not on file     Minutes per session: Not on file    Stress: Not on file   Relationships    Social connections     Talks on phone: Not on file     Gets together: Not on file     Attends Spiritism service: Not on file     Active member of club or organization: Not on file     Attends meetings of clubs or organizations: Not on file     Relationship status: Not on file    Intimate partner violence     Fear of current or ex partner: Not on file     Emotionally abused: Not on file     Physically abused: Not on file     Forced sexual activity: Not on file   Other Topics Concern    Not on file   Social History Narrative    Not on file      Family History   Problem Relation Age of Onset    Diabetes Mother         \"Borderline Diabetes\"    High Blood Pressure Mother     Obesity Mother     Arthritis Mother     Heart Disease Mother     High Cholesterol Mother     Vision Loss Mother     Diabetes Father     High Blood Pressure Father     Asthma Father     Cancer Father         prostate cancer    High Blood Pressure Brother     Asthma Son     Vision Loss Son     Lupus Daughter     Other Daughter         \"Alot Of Female Problems\"    otherwise irrelevant to this surgical issue. Review of Systems:  Constitutional: Negative for fever, chills, diaphoresis, appetite change and fatigue. HENT: Negative for sore throat, trouble swallowing and voice change. Respiratory: Negative for cough, positive for shortness of breath no wheezing. Cardiovascular: Negative for chest pain positive for SOB with one flight of stairs exertion, no pitting LE edema. Gastrointestinal: Positive for RUQ pain, and for nausea, vomiting, and diarrhea, NO abdominal distention, constipation, blood in stool, anal bleeding or rectal pain. Musculoskeletal: Negative for joint swelling and arthralgias. Skin: Warm and dry, well perfused. Neurological: Negative for seizures, syncope, speech difficulty and weakness. Hematological/Lymphatic: Negative for adenopathy. No history of DVT/PE. Does not bruise/bleed easily. Psychiatric/Behavioral: Negative for agitation. All others reviewed and negative. Physical Exam:  Vital Signs:   Vitals:    08/17/20 1632   BP: 113/81   Pulse: 50   Resp: 18   Temp:    SpO2: 99%     Body mass index is 45.83 kg/m².     General appearance: Pt Alert and oriented, in no apparent acute distress. HEENT:  KATE, Conjunctiva clear. EOMI, No JVDs, Bruits, Megalies: neither thyroid nor lymph nodes. Lungs: Clear to auscultation bilaterally. Heart: Regular rate and rhythm, S1, S2 normal, no murmur, rub or gallop. Abdomen: Mildly tender RUQ. Non distended. Positive bowel sounds. No hernias noted, no masses palpable. Extremities: Warm, well perfused, no cyanosis or edema. Skin: Skin color, texture, turgor normal.  Neurologic: Grossly normal, Cranial nerves from II to XII intact, Deep tendon reflexes normal.  Lymph nodes: No palpable LNs, Cervical, groins, abdominal.  Musculoskeletal: Bilateral Upper and lower extremities ROM within normal limits. Radiologic / Imaging / TESTING  CT abdomen and pelvis:  Impression    1. Redemonstration of scattered mild bilateral pneumobilia and mild air    within the common bile duct in setting of cholecystectomy.  Differential    diagnostic considerations include incompetent sphincter of Oddi,    biliary-enteric surgical anastomosis, spontaneous biliary-enteric fistula,    recent biliary instrumentation.  Recommend clinical correlation. 2. Gastric resection postoperative findings without associated complication    visualized. 3. Left renal inferior collecting system punctate calculus without associated    urinary obstruction.       Labs:    Recent Results (from the past 24 hour(s))   Urinalysis    Collection Time: 08/17/20  1:13 PM   Result Value Ref Range    Color, UA YELLOW YELLOW    Clarity, UA CLEAR CLEAR    Glucose, Urine NEGATIVE NEGATIVE MG/DL    Bilirubin Urine NEGATIVE NEGATIVE MG/DL    Ketones, Urine NEGATIVE NEGATIVE MG/DL    Specific Gravity, UA 1.010 1.001 - 1.035    Blood, Urine NEGATIVE NEGATIVE    pH, Urine 6.0 5.0 - 8.0    Protein, UA NEGATIVE NEGATIVE MG/DL    Urobilinogen, Urine NORMAL 0.2 - 1.0 MG/DL    Nitrite Urine, Quantitative NEGATIVE NEGATIVE    Leukocyte Esterase, Urine NEGATIVE NEGATIVE    RBC, UA NONE SEEN 0 - 6 /HPF WBC, UA 1 0 - 5 /HPF    Bacteria, UA NEGATIVE NEGATIVE /HPF    Squam Epithel, UA 5 /HPF    Mucus, UA RARE (A) NEGATIVE HPF    Trichomonas, UA NONE SEEN NONE SEEN /HPF   Urine Drug Screen    Collection Time: 08/17/20  1:13 PM   Result Value Ref Range    Cannabinoid Scrn, Ur NEGATIVE NEGATIVE    Amphetamines NEGATIVE NEGATIVE    Cocaine Metabolite NEGATIVE NEGATIVE    Benzodiazepine Screen, Urine NEGATIVE NEGATIVE    Barbiturate Screen, Ur NEGATIVE NEGATIVE    Opiates, Urine NEGATIVE NEGATIVE    Phencyclidine, Urine NEGATIVE NEGATIVE    Oxycodone UNCONFIRMED POSITIVE (A) NEGATIVE   CBC auto diff    Collection Time: 08/17/20  1:30 PM   Result Value Ref Range    WBC 4.0 4.0 - 10.5 K/CU MM    RBC 3.82 (L) 4.2 - 5.4 M/CU MM    Hemoglobin 10.4 (L) 12.5 - 16.0 GM/DL    Hematocrit 32.5 (L) 37 - 47 %    MCV 85.1 78 - 100 FL    MCH 27.2 27 - 31 PG    MCHC 32.0 32.0 - 36.0 %    RDW 13.8 11.7 - 14.9 %    Platelets 120 (L) 362 - 440 K/CU MM    MPV 10.8 7.5 - 11.1 FL    Differential Type AUTOMATED DIFFERENTIAL     Segs Relative 51.8 36 - 66 %    Lymphocytes % 35.0 24 - 44 %    Monocytes % 8.1 (H) 0 - 4 %    Eosinophils % 4.0 (H) 0 - 3 %    Basophils % 0.8 0 - 1 %    Segs Absolute 2.1 K/CU MM    Lymphocytes Absolute 1.4 K/CU MM    Monocytes Absolute 0.3 K/CU MM    Eosinophils Absolute 0.2 K/CU MM    Basophils Absolute 0.0 K/CU MM    Nucleated RBC % 0.0 %    Total Nucleated RBC 0.0 K/CU MM    TRC 0.0596 K/CU MM    Total Immature Neutrophil 0.01 K/CU MM    Immature Neutrophil % 0.3 0 - 0.43 %   CMP    Collection Time: 08/17/20  1:30 PM   Result Value Ref Range    Sodium 138 135 - 145 MMOL/L    Potassium 4.2 3.5 - 5.1 MMOL/L    Chloride 107 99 - 110 mMol/L    CO2 22 21 - 32 MMOL/L    BUN 16 6 - 23 MG/DL    CREATININE 0.8 0.6 - 1.1 MG/DL    Glucose 100 (H) 70 - 99 MG/DL    Calcium 10.0 8.3 - 10.6 MG/DL    Alb 3.9 3.4 - 5.0 GM/DL    Total Protein 6.2 (L) 6.4 - 8.2 GM/DL    Total Bilirubin 0.2 0.0 - 1.0 MG/DL    ALT 22 10 - 40 U/L    AST 19 15 - 37 IU/L    Alkaline Phosphatase 97 40 - 129 IU/L    GFR Non-African American >60 >60 mL/min/1.73m2    GFR African American >60 >60 mL/min/1.73m2    Anion Gap 9 4 - 16   Lipase    Collection Time: 08/17/20  1:30 PM   Result Value Ref Range    Lipase 16 13 - 60 IU/L   Lactic Acid, Plasma    Collection Time: 08/17/20  1:30 PM   Result Value Ref Range    Lactate 0.7 0.4 - 2.0 mMOL/L   Troponin    Collection Time: 08/17/20  1:30 PM   Result Value Ref Range    Troponin T <0.010 <0.01 NG/ML   Magnesium    Collection Time: 08/17/20  1:31 PM   Result Value Ref Range    Magnesium 2.2 1.8 - 2.4 mg/dl   EKG 12 Lead    Collection Time: 08/17/20  1:55 PM   Result Value Ref Range    Ventricular Rate 53 BPM    Atrial Rate 53 BPM    P-R Interval 192 ms    QRS Duration 96 ms    Q-T Interval 420 ms    QTc Calculation (Bazett) 394 ms    P Axis 39 degrees    R Axis -18 degrees    T Axis 12 degrees    Diagnosis       Sinus bradycardia  Incomplete right bundle branch block  Borderline ECG  When compared with ECG of 05-AUG-2020 12:39,  No significant change was found         Diagnosis:  Patient Active Problem List   Diagnosis    Fibromyalgia    Lupus (systemic lupus erythematosus) (HCC)    Chronic pancreatitis (HCC)    HTN (hypertension)    Generalized abdominal pain    Frequent UTI    Gastroesophageal reflux disease without esophagitis    Depression    Fatty liver disease, nonalcoholic    Arthritis    Bilateral low back pain with left-sided sciatica    Intractable vomiting with nausea    Hiatal hernia    Status post laparoscopic sleeve gastrectomy    Pseudoseizure    Chronic pain syndrome    Drug-seeking/Aberrant behavior    Lymph node disorder    Morbid obesity with BMI of 40.0-44.9, adult (HCC)    Iron deficiency anemia secondary to blood loss (chronic)    Encounter for weight management    Intractable nausea and vomiting    History of Jet-en-Y gastric bypass    Seizure (HCC)    Abdominal pain Assessment & plan:  Yu Guerin is a very pleasant 44 y.o. female presenting with a h/o sleeve gasrtectomy and describes abdominal pain post prandial with nausea and vomiting and diarrhea, with sweet food. . re-counseled her IN LENGTH, will re-consult dietitian to help her with food choices, and Dr Natanael Hoang consulted Dr Fatimah Guajardo to help her RUQ pain. Tj Morales and I'll follow. . Her case was discussed in length with both dr Natanael Hoang, and dr Fatimah Guajardo. Thank you doctor Marek Rodriguez MD very much for your consultation and for the opportunity to take care of Mrs Yu Guerin with you, I'll follow along with you and I'll update you on any new events in her care.    ____________________________________________    Arnulfo Escobedo MD, FACS, FICS    8/17/2020  6:15 PM      Patient was seen with total face to face time of 45 minutes. More than 50% of this visit was counseling and education as above in my assessment and plan section of my note.

## 2020-08-17 NOTE — ED NOTES
Bed: ED-28  Expected date:   Expected time:   Means of arrival:   Comments:  COVID (EVS clean)     Stefano Hodge RN  08/17/20 [id number]

## 2020-08-17 NOTE — CARE COORDINATION
Pt identified as potential readmission. Last admission 8/5 - 8/9 for Seizure .     Pt presented in ED today after recent admission for epigastrium pain, nausea vomiting, headache. Patient was admitted admitted on 8/5 for similar. Had a neurologic work-up acutely negative, has history of pseudoseizures. Also had a EGD done recently by Dr. Triny Samson.  Patient admits to a gradually worsening headache. Has also had continued epigastrium pain. She has known history of chronic abdominal pain, pseudoseizures, headaches. Patient reported that she has been vomiting in room. CM went to room and introduced self and explained role of CM. Patient reported that she had just been in hospital and was supposed to go see Dr. Davida Salinas today, but was not feeling well, so patient called Dr. Jennifer Calvin' office and explained her situation and patient reported that Dr. Jennifer Calvin' office suggested that patient just go on ahead to the emergency room. Pt. DX: Sinus Bradycardia 53    Pt is requiring readmission and there were no alternatives to admission to explore at this time.      LSW spoke to this pt and LSW explained CM role. Pt reports she lives w/her mother in a 1 story home. . Pt does have insurance and can afford her medications. Pt has transportation via self family friends. Pt does have a PCP. No needs anticipated.

## 2020-08-17 NOTE — ED NOTES
Felipa Telles [place] notified patient got sick on the floor of moderate amount bile.   Patient given cool towel for headache per patient request.       Giovanna Trejo RN  08/17/20 8001

## 2020-08-17 NOTE — ED PROVIDER NOTES
12 lead EKG per my interpretation:  Sinus Bradycardia 53  Axis is   Left axis deviation  QTc is  394  There is specific T wave changes appreciated. Inverted T wave III  There is no specific ST wave changes appreciated.     Prior EKG to compare with was not available         Jose Elias Gibson DO  08/17/20 140

## 2020-08-17 NOTE — ED PROVIDER NOTES
100 FL    MCH 27.2 27 - 31 PG    MCHC 32.0 32.0 - 36.0 %    RDW 13.8 11.7 - 14.9 %    Platelets 614 (L) 138 - 440 K/CU MM    MPV 10.8 7.5 - 11.1 FL    Differential Type AUTOMATED DIFFERENTIAL     Segs Relative 51.8 36 - 66 %    Lymphocytes % 35.0 24 - 44 %    Monocytes % 8.1 (H) 0 - 4 %    Eosinophils % 4.0 (H) 0 - 3 %    Basophils % 0.8 0 - 1 %    Segs Absolute 2.1 K/CU MM    Lymphocytes Absolute 1.4 K/CU MM    Monocytes Absolute 0.3 K/CU MM    Eosinophils Absolute 0.2 K/CU MM    Basophils Absolute 0.0 K/CU MM    Nucleated RBC % 0.0 %    Total Nucleated RBC 0.0 K/CU MM    TRC 0.0596 K/CU MM    Total Immature Neutrophil 0.01 K/CU MM    Immature Neutrophil % 0.3 0 - 0.43 %   CMP   Result Value Ref Range    Sodium 138 135 - 145 MMOL/L    Potassium 4.2 3.5 - 5.1 MMOL/L    Chloride 107 99 - 110 mMol/L    CO2 22 21 - 32 MMOL/L    BUN 16 6 - 23 MG/DL    CREATININE 0.8 0.6 - 1.1 MG/DL    Glucose 100 (H) 70 - 99 MG/DL    Calcium 10.0 8.3 - 10.6 MG/DL    Alb 3.9 3.4 - 5.0 GM/DL    Total Protein 6.2 (L) 6.4 - 8.2 GM/DL    Total Bilirubin 0.2 0.0 - 1.0 MG/DL    ALT 22 10 - 40 U/L    AST 19 15 - 37 IU/L    Alkaline Phosphatase 97 40 - 129 IU/L    GFR Non-African American >60 >60 mL/min/1.73m2    GFR African American >60 >60 mL/min/1.73m2    Anion Gap 9 4 - 16   Lipase   Result Value Ref Range    Lipase 16 13 - 60 IU/L   Urinalysis   Result Value Ref Range    Color, UA YELLOW YELLOW    Clarity, UA CLEAR CLEAR    Glucose, Urine NEGATIVE NEGATIVE MG/DL    Bilirubin Urine NEGATIVE NEGATIVE MG/DL    Ketones, Urine NEGATIVE NEGATIVE MG/DL    Specific Gravity, UA 1.010 1.001 - 1.035    Blood, Urine NEGATIVE NEGATIVE    pH, Urine 6.0 5.0 - 8.0    Protein, UA NEGATIVE NEGATIVE MG/DL    Urobilinogen, Urine NORMAL 0.2 - 1.0 MG/DL    Nitrite Urine, Quantitative NEGATIVE NEGATIVE    Leukocyte Esterase, Urine NEGATIVE NEGATIVE    RBC, UA NONE SEEN 0 - 6 /HPF    WBC, UA 1 0 - 5 /HPF    Bacteria, UA NEGATIVE NEGATIVE /HPF    Squam Epithel, UA 5 /HPF    Mucus, UA RARE (A) NEGATIVE HPF    Trichomonas, UA NONE SEEN NONE SEEN /HPF   Lactic Acid, Plasma   Result Value Ref Range    Lactate 0.7 0.4 - 2.0 mMOL/L   Troponin   Result Value Ref Range    Troponin T <0.010 <0.01 NG/ML   Magnesium   Result Value Ref Range    Magnesium 2.2 1.8 - 2.4 mg/dl   Urine Drug Screen   Result Value Ref Range    Cannabinoid Scrn, Ur NEGATIVE NEGATIVE    Amphetamines NEGATIVE NEGATIVE    Cocaine Metabolite NEGATIVE NEGATIVE    Benzodiazepine Screen, Urine NEGATIVE NEGATIVE    Barbiturate Screen, Ur NEGATIVE NEGATIVE    Opiates, Urine NEGATIVE NEGATIVE    Phencyclidine, Urine NEGATIVE NEGATIVE    Oxycodone UNCONFIRMED POSITIVE (A) NEGATIVE   EKG 12 Lead   Result Value Ref Range    Ventricular Rate 53 BPM    Atrial Rate 53 BPM    P-R Interval 192 ms    QRS Duration 96 ms    Q-T Interval 420 ms    QTc Calculation (Bazett) 394 ms    P Axis 39 degrees    R Axis -18 degrees    T Axis 12 degrees    Diagnosis       Sinus bradycardia  Incomplete right bundle branch block  Borderline ECG  When compared with ECG of 05-AUG-2020 12:39,  No significant change was found  Confirmed by OPAL Perkins ([id number]) on 8/17/2020 8:25:00 PM       Imaging  Ct Abdomen Pelvis W Iv Contrast Additional Contrast? None    Result Date: 8/17/2020  EXAMINATION: CT OF THE ABDOMEN AND PELVIS WITH CONTRAST 8/17/2020 4:28 pm TECHNIQUE: CT of the abdomen and pelvis was performed with the administration of intravenous contrast. Multiplanar reformatted images are provided for review. Dose modulation, iterative reconstruction, and/or weight based adjustment of the mA/kV was utilized to reduce the radiation dose to as low as reasonably achievable. COMPARISON: CT abdomen and pelvis with contrast July 21, 2020.  HISTORY: ORDERING SYSTEM PROVIDED HISTORY: abd pain, TECHNOLOGIST PROVIDED HISTORY: Reason for exam:->abd pain, Additional Contrast?->None Reason for Exam: abd pain Acuity: Acute Type of Exam: Initial Additional signs and symptoms: abd pain, emesis, nausea FINDINGS: Lower Chest: The lung bases are well aerated. Pleural surfaces are unremarkable and no evidence of pleural effusion is identified. Organs: The gallbladder is surgically absent. Scattered mild bilateral pneumobilia is present with mild air seen within the common bile duct. Inferior left renal punctate calculus is noted within the collecting system without associated urinary obstruction. The liver, spleen, pancreas, adrenal glands, kidneys, are otherwise unremarkable in appearance. GI/Bowel: Gastric resection postoperative findings are noted without evidence of associated complication seen. The stomach is otherwise unremarkable without wall thickening or distention. Bowel loops are unremarkable in appearance without evidence of obstruction, distension or mucosal thickening. Pelvis: The urinary bladder is well distended and unremarkable in appearance. No evidence of pelvic free fluid is seen. Peritoneum/Retroperitoneum: No evidence of retroperitoneal or intraperitoneal lymphadenopathy is identified. No evidence of intraperitoneal free fluid is seen. Bones/Soft Tissues: The bones, skeletal muscle bundles, fascial planes and subcutaneous soft tissues are unremarkable in appearance. 1. Redemonstration of scattered mild bilateral pneumobilia and mild air within the common bile duct in setting of cholecystectomy. Differential diagnostic considerations include incompetent sphincter of Oddi, biliary-enteric surgical anastomosis, spontaneous biliary-enteric fistula, recent biliary instrumentation. Recommend clinical correlation. 2. Gastric resection postoperative findings without associated complication visualized. 3. Left renal inferior collecting system punctate calculus without associated urinary obstruction.      Ct Abdomen Pelvis W Iv Contrast Additional Contrast? None    Result Date: 7/21/2020  EXAMINATION: CT OF THE ABDOMEN AND PELVIS WITH CONTRAST 7/21/2020 1:07 am TECHNIQUE: CT of the abdomen and pelvis was performed with the administration of intravenous contrast. Multiplanar reformatted images are provided for review. Dose modulation, iterative reconstruction, and/or weight based adjustment of the mA/kV was utilized to reduce the radiation dose to as low as reasonably achievable. COMPARISON: None HISTORY: ORDERING SYSTEM PROVIDED HISTORY: ruq pain TECHNOLOGIST PROVIDED HISTORY: IV contrast only. Thank you. Additional Contrast?->None Reason for exam:->ruq pain Reason for Exam: pain under right breast area/ sob FINDINGS: Lower Chest: There are no focal consolidations. Stable pulmonary nodules are seen. These are likely benign and no follow-up is indicated. Organs: Pneumobilia is noted. There has been a cholecystectomy. Mild intrahepatic and extrahepatic bile duct dilatation is seen, likely related to post cholecystectomy status. Spleen, adrenal glands, pancreas, and kidneys are unremarkable. GI/Bowel: There is evidence of gastric sleeve surgery. There is no evidence of bowel obstruction. The appendix is not visualized. Pelvis: The bladder is unremarkable. There has been a hysterectomy. There is no free fluid. Peritoneum/Retroperitoneum: There is no evidence of free air or lymphadenopathy. Bones/Soft Tissues: No destructive osseous lesions are identified. 1. No acute findings. 2. Pneumobilia with mild intrahepatic and extrahepatic bile duct dilatation, likely related to post cholecystectomy status and prior biliary intervention. This is not significantly changed.      Xr Chest Portable    Result Date: 8/5/2020  EXAMINATION: ONE XRAY VIEW OF THE CHEST 8/5/2020 12:45 pm COMPARISON: July 20, 2020 HISTORY: ORDERING SYSTEM PROVIDED HISTORY: dizziness, sob TECHNOLOGIST PROVIDED HISTORY: Reason for exam:->dizziness, sob Reason for Exam: sob, dizziness Initial evaluation, acute shortness of breath, dizziness FINDINGS: The cardiomediastinal silhouette is moderately enlarged. Lung volumes are low. There is mild pulmonary venous congestion. Left-sided port is noted, with distal tip projecting over the SVC. There is some kinking of the catheter noted in the subclavian region. Lungs are clear with exception of mild scarring at the left costophrenic angle. No pleural effusion or pneumothorax. Cardiomegaly with mild congestive changes. Xr Chest Portable    Result Date: 7/20/2020  EXAMINATION: ONE XRAY VIEW OF THE CHEST 7/20/2020 10:30 pm COMPARISON: Chest 07/03/2020 HISTORY: Acute chest pain FINDINGS: The cardiomediastinal and hilar silhouettes appear unremarkable. The left lung appears clear. No pleural effusion evident. Unchanged curvilinear density lateral lower left lung partially obscuring the left hemidiaphragm. No pneumothorax is seen. No acute osseous abnormality is identified. Left subclavian Port-A-Cath with prominent kink at the left subclavian level. Atelectasis or scarring lateral lower left lung appears unchanged from prior studies. Left subclavian Port-A-Cath with prominent kink at the left subclavian level. Cta Pulmonary W Contrast    Result Date: 7/21/2020  EXAMINATION: CTA OF THE CHEST 7/21/2020 1:07 am TECHNIQUE: CTA of the chest was performed after the administration of intravenous contrast.  Multiplanar reformatted images are provided for review. MIP images are provided for review. Dose modulation, iterative reconstruction, and/or weight based adjustment of the mA/kV was utilized to reduce the radiation dose to as low as reasonably achievable. COMPARISON: None. HISTORY: ORDERING SYSTEM PROVIDED HISTORY: chest pain TECHNOLOGIST PROVIDED HISTORY: Reason for exam:->chest pain Reason for Exam: pain under right breast area/ sob FINDINGS: Pulmonary Arteries: Pulmonary arteries are adequately opacified for evaluation. No evidence of intraluminal filling defect to suggest pulmonary embolism.   Main pulmonary artery is normal in caliber. Mediastinum: No evidence of mediastinal lymphadenopathy. The heart and pericardium demonstrate no acute abnormality. There is no acute abnormality of the thoracic aorta. Lungs/pleura: The lungs are without acute process. No focal consolidation or pulmonary edema. No evidence of pleural effusion or pneumothorax. Upper Abdomen: Limited images of the upper abdomen are unremarkable. Soft Tissues/Bones: No acute bone or soft tissue abnormality. No evidence of pulmonary embolism or acute pulmonary abnormality. Mri Brain W Wo Contrast    Result Date: 8/6/2020  EXAMINATION: MRI OF THE BRAIN WITHOUT AND WITH CONTRAST  8/6/2020 5:32 pm TECHNIQUE: Multiplanar multisequence MRI of the head/brain was performed without and with the administration of intravenous contrast. COMPARISON: None. HISTORY: ORDERING SYSTEM PROVIDED HISTORY: seizure, headaches TECHNOLOGIST PROVIDED HISTORY: Reason for exam:->seizure, headaches Is the patient pregnant?->No Reason for Exam: seizure, headaches Acuity: Acute Type of Exam: Initial Additional signs and symptoms: none Relevant Medical/Surgical History: 14ml multihance// gfr>60 FINDINGS: Motion degrades images limiting evaluation. INTRACRANIAL STRUCTURES/VENTRICLES:  There is no acute infarct. The hippocampal structures are symmetric. No abnormal T2 FLAIR signal within the medial temporal lobes. No mass effect or midline shift. No evidence of an acute intracranial hemorrhage. The ventricles and sulci are normal in size and configuration. The sellar/suprasellar regions appear unremarkable. The normal signal voids within the major intracranial vessels appear maintained. No abnormal focus of enhancement is seen within the brain. ORBITS: The visualized portion of the orbits demonstrate no acute abnormality. SINUSES: The visualized paranasal sinuses and mastoid air cells are well aerated.  BONES/SOFT TISSUES: The bone marrow signal intensity appears normal. The soft tissues demonstrate no acute abnormality. No acute intracranial abnormality. No acute infarct. CLINICAL IMPRESSION  1. Intractable vomiting with nausea    2. Chronic abdominal pain        Vitals:  Blood pressure 113/81, pulse 60, temperature 98.8 °F (37.1 °C), temperature source Oral, resp. rate 13, height 5' 4\" (1.626 m), weight 267 lb (121.1 kg), SpO2 98 %, not currently breastfeeding.      Karen Haro [id number], PA  08/17/20 1354

## 2020-08-17 NOTE — ED PROVIDER NOTES
eMERGENCY dEPARTMENT eNCOUnter      PCP: Santos Dill MD    CHIEF COMPLAINT    Chief Complaint   Patient presents with    Abdominal Pain     started a week ago and has progressed; sent by pcp    Emesis    Nausea     Pt was not seen by physician    HPI    Antonio Murphy is a 44 y.o. female who presents with chief complaints of epigastric abdominal pain, nausea vomiting and headache. p recently admitted for abdominal pain nausea vomiting seizure activity. Had a couple admissions in the month of July. She did have an EGD done 7/17/2020 with Dr. Isaias Phalen.   Patient has been taking her prescribed Keppra for seizures. States over the last week she has had gradually progressing headache without trauma. Also having gradually progressing epigastric abdominal pain nausea vomiting. States that she has not kept her seizure medications down for the last couple days. She denies any known hematemesis. Does also endorse diarrhea that is nonbloody. Denies fevers. She has been taking her Protonix as prescribed. REVIEW OF SYSTEMS    Constitutional:  Denies fever, chills, weight loss or weakness   HENT:  Denies sore throat or ear pain   Cardiovascular:  Denies palpitations or swelling   Respiratory:  Denies cough or shortness of breath   GI:  See HPI above  : No hematuria or dysuria. No vaginal symptoms. Musculoskeletal:  Denies back pain or groin pain or masses. No pain or swelling of extremities.   Skin:  Denies rash  Neurologic:  Denies focal weakness or sensory changes   Endocrine:  Denies polyuria or polydypsia   Lymphatic:  Denies swollen glands     All other review of systems are negative  See HPI and nursing notes for additional information     PAST MEDICAL & SURGICAL HISTORY    Past Medical History:   Diagnosis Date    Acid reflux     Anemia     Anxiety     Arthritis     Hands, Back And Ankles    Bleeding ulcer 2014    \"I Had Ulcers In My Stomach And Colon\"/ per pt on 8/12/2019\"they said recently having some blood in my stomach- in July ( 2019)could not find where coming from \"    Bronchitis Last Episode 2014    Chronic back pain     Chronic pain     Sees Dr. Bebeto Blackwell COPD (chronic obstructive pulmonary disease) ([facility] [address]     Sees Dr. Merry Mckee Depression     Fibromyalgia Dx 2013    H/O echocardiogram 8/11/15    EF >55%. LA to be at the upper limit of normal in size. LV hypertrophy with normal LV systolic, but abnormal diastolic function. Normal valvular structures and function.  H/O echocardiogram 08/30/2018    EF 55-60%    Hiatal hernia     History of blood transfusion 09/2015 And 2018    No Reaction To Blood Transfusions Received    [place] (hard of hearing)     Right Ear    Hx of cardiac catheterization 10/24/2010    Angiographically normal coronary arteries w/ normal LV function and wall motion.  Hx of transesophageal echocardiography (PILO) for monitoring 12/2/2010    EF 55-60%. WNL.    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 name    Kidney cysts     \"they found that I have a kidney cyst but not sure which side\" per pt on 7/15/2020    Lupus ([facility] [address] Dx 2013    \"Rheumatoid Lupus\"    MDRO (multiple drug resistant organisms) resistance     4 months ago chest from mediport    Morbid obesity ([facility] [address]     MRSA bacteremia     Nausea     \"Most Of The Time\"    Pain management     Sees Dr. Iker Edwards, Once A Month    Pancreatitis chronic Dx 2001    Pneumonia Dx 11-15    Shortness of breath on exertion     Shortness of breath on exertion     Sleep apnea     Has CPAP\"no longer use the cpap since lost weight\"    Staph infection Dx 11-15    Left Foot    Staph infection Dx 11-15 \"Left Foot\"    Teeth missing     Upper And Lower    Thyroid disease     Wears glasses     To Read     Past Surgical History:   Procedure Laterality Date    APPENDECTOMY  02/1998    Done When Tubes And Ovaries Were Removed    CARDIAC CATHETERIZATION  10/24/2010    CATHETER REMOVAL N/A 7/16/2019    PORT REMOVAL performed by Yue Myrick MD at [facility]  08/27/1991    CHOLECYSTECTOMY, LAPAROSCOPIC  1990's    COLONOSCOPY  Last Done In 2000's    DENTAL SURGERY      Teeth Extracted In Past    ENDOSCOPY, COLON, DIAGNOSTIC  Last Done In 2018    FOOT DEBRIDEMENT Left 6/16/2019    FIRST METATARSAL DEBRIDEMENT INCISION AND DRAINAGE. EXCISION OF ULCER.  RESECTION OF BONE. 1ST METATARSAL POWER LAVAGE AND BONE CEMENT performed by Gatito Minor DPM at [address] Left Last Done In 2016     Dr. Shannon Collazo, \" About 6 Surgeries Done Because Of Staph Infection\"    HIATAL HERNIA REPAIR N/A 2/12/2019    HERNIA HIATAL LAPAROSCOPIC ROBOTIC [facility]  10/1997    Partial Abdominal Hysterectomy    INSERTION / REMOVAL / REPLACEMENT VENOUS ACCESS CATHETER N/A 8/15/2019    PORT INSERTION performed by Yue Myrick MD at [facility]    removed after 6 months    LUNG REMOVAL, PARTIAL Left 2008    Benign    OTHER SURGICAL HISTORY  02/1998    \"Tubes And Ovaries Removed, Appendectomy Also Done\"    OTHER SURGICAL HISTORY  Last Done 7-15-16    Mediport Insertion \"Total Of Six Done, Removed Last Mediport In 2014\"    PORT SURGERY N/A 1/15/2020    PORT REMOVAL performed by Yue Myrick MD at [address] N/A 7/6/2020    PORT INSERTION performed by Yue Myrick MD at [facility] N/A 2/12/2019    GASTRECTOMY SLEEVE LAPAROSCOPIC ROBOTIC performed by Yue Myrick MD at [address] [place] [address]  08/27/2018    UPPER Daily (Patient taking differently: Take 125 mcg by mouth nightly ) 30 tablet 0    gabapentin (NEURONTIN) 800 MG tablet Take 800 mg by mouth 3 times daily      PARoxetine (PAXIL) 30 MG tablet Take 50 mg by mouth nightly          ALLERGIES    Allergies   Allergen Reactions    Aspirin Palpitations     \"My Heart Rate Elevates\"    Shellfish-Derived Products Swelling    Toradol [Ketorolac Tromethamine] Rash    Compazine [Prochlorperazine]     Reglan [Metoclopramide] Itching       SOCIAL AND FAMILY HISTORY    Social History     Socioeconomic History    Marital status:      Spouse name: Not on file    Number of children: Not on file    Years of education: Not on file    Highest education level: Not on file   Occupational History    Not on file   Social Needs    Financial resource strain: Not on file    Food insecurity     Worry: Not on file     Inability: Not on file    Transportation needs     Medical: Not on file     Non-medical: Not on file   Tobacco Use    Smoking status: Never Smoker    Smokeless tobacco: Never Used   Substance and Sexual Activity    Alcohol use: No     Alcohol/week: 0.0 standard drinks    Drug use: No    Sexual activity: Not Currently   Lifestyle    Physical activity     Days per week: Not on file     Minutes per session: Not on file    Stress: Not on file   Relationships    Social connections     Talks on phone: Not on file     Gets together: Not on file     Attends Muslim service: Not on file     Active member of club or organization: Not on file     Attends meetings of clubs or organizations: Not on file     Relationship status: Not on file    Intimate partner violence     Fear of current or ex partner: Not on file     Emotionally abused: Not on file     Physically abused: Not on file     Forced sexual activity: Not on file   Other Topics Concern    Not on file   Social History Narrative    Not on file     Family History   Problem Relation Age of Onset    Diabetes Mother         \"Borderline Diabetes\"    High Blood Pressure Mother     Obesity Mother     Arthritis Mother     Heart Disease Mother     High Cholesterol Mother     Vision Loss Mother     Diabetes Father     High Blood Pressure Father     Asthma Father     Cancer Father         prostate cancer    High Blood Pressure Brother     Asthma Son     Vision Loss Son     Lupus Daughter     Other Daughter         \"Alot Of Female Problems\"       PHYSICAL EXAM    VITAL SIGNS: BP (!) 113/53   Pulse 93   Temp 98.8 °F (37.1 °C) (Oral)   Resp 17   Ht 5' 4\" (1.626 m)   Wt 267 lb (121.1 kg)   SpO2 98%   BMI 45.83 kg/m²   Constitutional:  Well developed, well nourished. No distress  Eyes:  Sclera nonicteric, conjunctiva moist  HENT:  Atraumatic. PERRL. EOMI.  moist mucus membranes. Neck/Lymphatics: supple, no JVD, no swollen nodes  Respiratory:  No retractions, no accessory muscle use, normal breath sounds   Cardiovascular:   normal rate, normal rhythm, no murmurs    GI:     No gross discoloration.       -no Rudy's sign (periumbilical ecchymosis)       -no Grey-Armstrong's sign (flank ecchymosis)  (necrosis/hemmorrhage may cause subcutaneous blood leakage)    Bowel sounds hypoactive, no audible bruits. Soft,  No distention, no guarding, no rigidity,   + Generalized nonfocal, non-peritoneal abdominal tenderness, no rebound, no palpable pulsatile masses,   No McBurney's point tenderness   Negative Rovsing sign    Negative Champion's sign. Back:   No CVA tenderness to percussion.   Musculoskeletal:  No edema, no deformity  Vascular: DP pulses 2+ equal bilaterally  Integument: No rash, dry skin  Neurologic:  Alert & oriented, normal speech  Psychiatric: Cooperative, pleasant affect       LABS:  Results for orders placed or performed during the hospital encounter of 08/17/20   CBC auto diff   Result Value Ref Range    WBC 4.0 4.0 - 10.5 K/CU MM    RBC 3.82 (L) 4.2 - 5.4 M/CU MM    Hemoglobin 10.4 (L) 12.5 - 16.0 GM/DL    Hematocrit 32.5 (L) 37 - 47 %    MCV 85.1 78 - 100 FL    MCH 27.2 27 - 31 PG    MCHC 32.0 32.0 - 36.0 %    RDW 13.8 11.7 - 14.9 %    Platelets 719 (L) 355 - 440 K/CU MM    MPV 10.8 7.5 - 11.1 FL    Differential Type AUTOMATED DIFFERENTIAL     Segs Relative 51.8 36 - 66 %    Lymphocytes % 35.0 24 - 44 %    Monocytes % 8.1 (H) 0 - 4 %    Eosinophils % 4.0 (H) 0 - 3 %    Basophils % 0.8 0 - 1 %    Segs Absolute 2.1 K/CU MM    Lymphocytes Absolute 1.4 K/CU MM    Monocytes Absolute 0.3 K/CU MM    Eosinophils Absolute 0.2 K/CU MM    Basophils Absolute 0.0 K/CU MM    Nucleated RBC % 0.0 %    Total Nucleated RBC 0.0 K/CU MM    TRC 0.0596 K/CU MM    Total Immature Neutrophil 0.01 K/CU MM    Immature Neutrophil % 0.3 0 - 0.43 %   CMP   Result Value Ref Range    Sodium 138 135 - 145 MMOL/L    Potassium 4.2 3.5 - 5.1 MMOL/L    Chloride 107 99 - 110 mMol/L    CO2 22 21 - 32 MMOL/L    BUN 16 6 - 23 MG/DL    CREATININE 0.8 0.6 - 1.1 MG/DL    Glucose 100 (H) 70 - 99 MG/DL    Calcium 10.0 8.3 - 10.6 MG/DL    Alb 3.9 3.4 - 5.0 GM/DL    Total Protein 6.2 (L) 6.4 - 8.2 GM/DL    Total Bilirubin 0.2 0.0 - 1.0 MG/DL    ALT 22 10 - 40 U/L    AST 19 15 - 37 IU/L    Alkaline Phosphatase 97 40 - 129 IU/L    GFR Non-African American >60 >60 mL/min/1.73m2    GFR African American >60 >60 mL/min/1.73m2    Anion Gap 9 4 - 16   Lipase   Result Value Ref Range    Lipase 16 13 - 60 IU/L   Urinalysis   Result Value Ref Range    Color, UA YELLOW YELLOW    Clarity, UA CLEAR CLEAR    Glucose, Urine NEGATIVE NEGATIVE MG/DL    Bilirubin Urine NEGATIVE NEGATIVE MG/DL    Ketones, Urine NEGATIVE NEGATIVE MG/DL    Specific Gravity, UA 1.010 1.001 - 1.035    Blood, Urine NEGATIVE NEGATIVE    pH, Urine 6.0 5.0 - 8.0    Protein, UA NEGATIVE NEGATIVE MG/DL    Urobilinogen, Urine NORMAL 0.2 - 1.0 MG/DL    Nitrite Urine, Quantitative NEGATIVE NEGATIVE    Leukocyte Esterase, Urine NEGATIVE NEGATIVE    RBC, UA NONE SEEN 0 - 6 /HPF    WBC, UA 1 0 - 5 /HPF Additional Contrast?->None Reason for Exam: abd pain Acuity: Acute Type of Exam: Initial Additional signs and symptoms: abd pain, emesis, nausea FINDINGS: Lower Chest: The lung bases are well aerated. Pleural surfaces are unremarkable and no evidence of pleural effusion is identified. Organs: The gallbladder is surgically absent. Scattered mild bilateral pneumobilia is present with mild air seen within the common bile duct. Inferior left renal punctate calculus is noted within the collecting system without associated urinary obstruction. The liver, spleen, pancreas, adrenal glands, kidneys, are otherwise unremarkable in appearance. GI/Bowel: Gastric resection postoperative findings are noted without evidence of associated complication seen. The stomach is otherwise unremarkable without wall thickening or distention. Bowel loops are unremarkable in appearance without evidence of obstruction, distension or mucosal thickening. Pelvis: The urinary bladder is well distended and unremarkable in appearance. No evidence of pelvic free fluid is seen. Peritoneum/Retroperitoneum: No evidence of retroperitoneal or intraperitoneal lymphadenopathy is identified. No evidence of intraperitoneal free fluid is seen. Bones/Soft Tissues: The bones, skeletal muscle bundles, fascial planes and subcutaneous soft tissues are unremarkable in appearance. 1. Redemonstration of scattered mild bilateral pneumobilia and mild air within the common bile duct in setting of cholecystectomy. Differential diagnostic considerations include incompetent sphincter of Oddi, biliary-enteric surgical anastomosis, spontaneous biliary-enteric fistula, recent biliary instrumentation. Recommend clinical correlation. 2. Gastric resection postoperative findings without associated complication visualized. 3. Left renal inferior collecting system punctate calculus without associated urinary obstruction.      Ct Abdomen Pelvis W Iv Contrast Additional Initial evaluation, acute shortness of breath, dizziness FINDINGS: The cardiomediastinal silhouette is moderately enlarged. Lung volumes are low. There is mild pulmonary venous congestion. Left-sided port is noted, with distal tip projecting over the SVC. There is some kinking of the catheter noted in the subclavian region. Lungs are clear with exception of mild scarring at the left costophrenic angle. No pleural effusion or pneumothorax. Cardiomegaly with mild congestive changes. Xr Chest Portable    Result Date: 7/20/2020  EXAMINATION: ONE XRAY VIEW OF THE CHEST 7/20/2020 10:30 pm COMPARISON: Chest 07/03/2020 HISTORY: Acute chest pain FINDINGS: The cardiomediastinal and hilar silhouettes appear unremarkable. The left lung appears clear. No pleural effusion evident. Unchanged curvilinear density lateral lower left lung partially obscuring the left hemidiaphragm. No pneumothorax is seen. No acute osseous abnormality is identified. Left subclavian Port-A-Cath with prominent kink at the left subclavian level. Atelectasis or scarring lateral lower left lung appears unchanged from prior studies. Left subclavian Port-A-Cath with prominent kink at the left subclavian level. Cta Pulmonary W Contrast    Result Date: 7/21/2020  EXAMINATION: CTA OF THE CHEST 7/21/2020 1:07 am TECHNIQUE: CTA of the chest was performed after the administration of intravenous contrast.  Multiplanar reformatted images are provided for review. MIP images are provided for review. Dose modulation, iterative reconstruction, and/or weight based adjustment of the mA/kV was utilized to reduce the radiation dose to as low as reasonably achievable. COMPARISON: None. HISTORY: ORDERING SYSTEM PROVIDED HISTORY: chest pain TECHNOLOGIST PROVIDED HISTORY: Reason for exam:->chest pain Reason for Exam: pain under right breast area/ sob FINDINGS: Pulmonary Arteries: Pulmonary arteries are adequately opacified for evaluation.   No evidence of intraluminal filling defect to suggest pulmonary embolism. Main pulmonary artery is normal in caliber. Mediastinum: No evidence of mediastinal lymphadenopathy. The heart and pericardium demonstrate no acute abnormality. There is no acute abnormality of the thoracic aorta. Lungs/pleura: The lungs are without acute process. No focal consolidation or pulmonary edema. No evidence of pleural effusion or pneumothorax. Upper Abdomen: Limited images of the upper abdomen are unremarkable. Soft Tissues/Bones: No acute bone or soft tissue abnormality. No evidence of pulmonary embolism or acute pulmonary abnormality. Mri Brain W Wo Contrast    Result Date: 8/6/2020  EXAMINATION: MRI OF THE BRAIN WITHOUT AND WITH CONTRAST  8/6/2020 5:32 pm TECHNIQUE: Multiplanar multisequence MRI of the head/brain was performed without and with the administration of intravenous contrast. COMPARISON: None. HISTORY: ORDERING SYSTEM PROVIDED HISTORY: seizure, headaches TECHNOLOGIST PROVIDED HISTORY: Reason for exam:->seizure, headaches Is the patient pregnant?->No Reason for Exam: seizure, headaches Acuity: Acute Type of Exam: Initial Additional signs and symptoms: none Relevant Medical/Surgical History: 14ml multihance// gfr>60 FINDINGS: Motion degrades images limiting evaluation. INTRACRANIAL STRUCTURES/VENTRICLES:  There is no acute infarct. The hippocampal structures are symmetric. No abnormal T2 FLAIR signal within the medial temporal lobes. No mass effect or midline shift. No evidence of an acute intracranial hemorrhage. The ventricles and sulci are normal in size and configuration. The sellar/suprasellar regions appear unremarkable. The normal signal voids within the major intracranial vessels appear maintained. No abnormal focus of enhancement is seen within the brain. ORBITS: The visualized portion of the orbits demonstrate no acute abnormality.  SINUSES: The visualized paranasal sinuses and mastoid air cells are well

## 2020-08-17 NOTE — ED NOTES
1810 paged Dr Nickolas Mireles  08/17/20 1811  1812 Dr Kimberly Yu returned call     Damian Zhang  08/17/20 1813

## 2020-08-18 PROCEDURE — 6370000000 HC RX 637 (ALT 250 FOR IP): Performed by: FAMILY MEDICINE

## 2020-08-18 PROCEDURE — 6360000002 HC RX W HCPCS: Performed by: FAMILY MEDICINE

## 2020-08-18 PROCEDURE — 99223 1ST HOSP IP/OBS HIGH 75: CPT | Performed by: SURGERY

## 2020-08-18 PROCEDURE — 6360000002 HC RX W HCPCS: Performed by: SURGERY

## 2020-08-18 PROCEDURE — 94761 N-INVAS EAR/PLS OXIMETRY MLT: CPT

## 2020-08-18 PROCEDURE — 1200000000 HC SEMI PRIVATE

## 2020-08-18 PROCEDURE — 2580000003 HC RX 258: Performed by: FAMILY MEDICINE

## 2020-08-18 RX ORDER — ONDANSETRON 2 MG/ML
4 INJECTION INTRAMUSCULAR; INTRAVENOUS EVERY 4 HOURS
Status: DISCONTINUED | OUTPATIENT
Start: 2020-08-18 | End: 2020-08-22 | Stop reason: HOSPADM

## 2020-08-18 RX ADMIN — MORPHINE SULFATE 2 MG: 2 INJECTION, SOLUTION INTRAMUSCULAR; INTRAVENOUS at 13:21

## 2020-08-18 RX ADMIN — FAMOTIDINE 20 MG: 20 TABLET ORAL at 08:49

## 2020-08-18 RX ADMIN — LEVETIRACETAM 1000 MG: 500 TABLET ORAL at 02:13

## 2020-08-18 RX ADMIN — OYSTER SHELL CALCIUM WITH VITAMIN D 1 TABLET: 500; 200 TABLET, FILM COATED ORAL at 08:48

## 2020-08-18 RX ADMIN — TIZANIDINE 4 MG: 4 TABLET ORAL at 02:12

## 2020-08-18 RX ADMIN — OYSTER SHELL CALCIUM WITH VITAMIN D 1 TABLET: 500; 200 TABLET, FILM COATED ORAL at 20:23

## 2020-08-18 RX ADMIN — GABAPENTIN 800 MG: 400 CAPSULE ORAL at 02:12

## 2020-08-18 RX ADMIN — MULTIPLE VITAMINS W/ MINERALS TAB 1 TABLET: TAB at 08:49

## 2020-08-18 RX ADMIN — ATENOLOL 25 MG: 25 TABLET ORAL at 08:48

## 2020-08-18 RX ADMIN — MORPHINE SULFATE 2 MG: 2 INJECTION, SOLUTION INTRAMUSCULAR; INTRAVENOUS at 04:44

## 2020-08-18 RX ADMIN — GABAPENTIN 800 MG: 400 CAPSULE ORAL at 08:48

## 2020-08-18 RX ADMIN — ENOXAPARIN SODIUM 40 MG: 40 INJECTION SUBCUTANEOUS at 08:50

## 2020-08-18 RX ADMIN — OXYCODONE AND ACETAMINOPHEN 1 TABLET: 5; 325 TABLET ORAL at 17:45

## 2020-08-18 RX ADMIN — PROMETHAZINE HYDROCHLORIDE 12.5 MG: 25 TABLET ORAL at 20:22

## 2020-08-18 RX ADMIN — ONDANSETRON 4 MG: 2 INJECTION INTRAMUSCULAR; INTRAVENOUS at 17:45

## 2020-08-18 RX ADMIN — LEVETIRACETAM 1000 MG: 500 TABLET ORAL at 20:22

## 2020-08-18 RX ADMIN — GUAIFENESIN 1200 MG: 600 TABLET, EXTENDED RELEASE ORAL at 20:23

## 2020-08-18 RX ADMIN — LORAZEPAM 1 MG: 1 TABLET ORAL at 02:12

## 2020-08-18 RX ADMIN — FAMOTIDINE 20 MG: 20 TABLET ORAL at 20:23

## 2020-08-18 RX ADMIN — GUAIFENESIN 1200 MG: 600 TABLET, EXTENDED RELEASE ORAL at 08:48

## 2020-08-18 RX ADMIN — GABAPENTIN 800 MG: 400 CAPSULE ORAL at 13:21

## 2020-08-18 RX ADMIN — SODIUM CHLORIDE, PRESERVATIVE FREE 10 ML: 5 INJECTION INTRAVENOUS at 20:23

## 2020-08-18 RX ADMIN — ESTRADIOL 0.5 MG: 1 TABLET ORAL at 08:49

## 2020-08-18 RX ADMIN — LEVETIRACETAM 1000 MG: 500 TABLET ORAL at 08:48

## 2020-08-18 RX ADMIN — LEVOTHYROXINE SODIUM 125 MCG: 125 TABLET ORAL at 08:49

## 2020-08-18 RX ADMIN — PAROXETINE HYDROCHLORIDE 50 MG: 20 TABLET, FILM COATED ORAL at 20:22

## 2020-08-18 RX ADMIN — MORPHINE SULFATE 2 MG: 2 INJECTION, SOLUTION INTRAMUSCULAR; INTRAVENOUS at 08:50

## 2020-08-18 RX ADMIN — MORPHINE SULFATE 2 MG: 2 INJECTION, SOLUTION INTRAMUSCULAR; INTRAVENOUS at 00:14

## 2020-08-18 RX ADMIN — GABAPENTIN 800 MG: 400 CAPSULE ORAL at 20:23

## 2020-08-18 RX ADMIN — PROMETHAZINE HYDROCHLORIDE 12.5 MG: 25 TABLET ORAL at 08:49

## 2020-08-18 RX ADMIN — MORPHINE SULFATE 2 MG: 2 INJECTION, SOLUTION INTRAMUSCULAR; INTRAVENOUS at 20:23

## 2020-08-18 RX ADMIN — Medication 5000 UNITS: at 08:49

## 2020-08-18 RX ADMIN — PROMETHAZINE HYDROCHLORIDE 12.5 MG: 25 TABLET ORAL at 00:13

## 2020-08-18 RX ADMIN — SODIUM CHLORIDE: 9 INJECTION, SOLUTION INTRAVENOUS at 13:22

## 2020-08-18 ASSESSMENT — PAIN SCALES - GENERAL
PAINLEVEL_OUTOF10: 7
PAINLEVEL_OUTOF10: 7
PAINLEVEL_OUTOF10: 8
PAINLEVEL_OUTOF10: 6
PAINLEVEL_OUTOF10: 8
PAINLEVEL_OUTOF10: 8
PAINLEVEL_OUTOF10: 6

## 2020-08-18 ASSESSMENT — PAIN DESCRIPTION - ORIENTATION: ORIENTATION: RIGHT;LEFT;MID

## 2020-08-18 ASSESSMENT — PAIN DESCRIPTION - PROGRESSION
CLINICAL_PROGRESSION: NOT CHANGED

## 2020-08-18 ASSESSMENT — PAIN DESCRIPTION - DESCRIPTORS: DESCRIPTORS: ACHING;CRAMPING

## 2020-08-18 ASSESSMENT — PAIN DESCRIPTION - PAIN TYPE: TYPE: ACUTE PAIN

## 2020-08-18 ASSESSMENT — PAIN - FUNCTIONAL ASSESSMENT: PAIN_FUNCTIONAL_ASSESSMENT: ACTIVITIES ARE NOT PREVENTED

## 2020-08-18 ASSESSMENT — PAIN DESCRIPTION - FREQUENCY: FREQUENCY: CONTINUOUS

## 2020-08-18 ASSESSMENT — PAIN DESCRIPTION - ONSET: ONSET: ON-GOING

## 2020-08-18 ASSESSMENT — PAIN DESCRIPTION - LOCATION: LOCATION: ABDOMEN;HEAD

## 2020-08-18 NOTE — PROGRESS NOTES
Attending Progress Note      PCP: Wally Elise MD    Patient: Tegan Love   Gender: female  : 1970   Age: 44 y.o. MRN: [id number]      Date of Admission: 2020    Chief Complaint:   Chief Complaint   Patient presents with    Abdominal Pain     started a week ago and has progressed; sent by pcp    Emesis    Nausea           Subjective: abd pain , nausea and vomiting         Medications:  Reviewed  Infusion Medications    sodium chloride 75 mL/hr at 20 1322     Scheduled Medications    ondansetron  4 mg Intravenous Q4H    PARoxetine  50 mg Oral Nightly    gabapentin  800 mg Oral TID    levothyroxine  125 mcg Oral Daily    therapeutic multivitamin-minerals  1 tablet Oral Daily    calcium-vitamin D  1 tablet Oral BID    estradiol  0.5 mg Oral Daily    atenolol  25 mg Oral Daily    vitamin D  5,000 Units Oral Daily    pantoprazole  40 mg Oral QAM AC    levETIRAcetam  1,000 mg Oral BID    sodium chloride flush  10 mL Intravenous BID    famotidine  20 mg Oral BID    nicotine  1 patch Transdermal Daily    enoxaparin  40 mg Subcutaneous Daily    guaiFENesin  1,200 mg Oral BID     PRN Meds: oxyCODONE-acetaminophen, dicyclomine, LORazepam, sodium chloride flush, potassium chloride **OR** potassium alternative oral replacement **OR** potassium chloride, magnesium sulfate, acetaminophen **OR** acetaminophen, polyethylene glycol, fleet, promethazine **OR** ondansetron, zolpidem, benzonatate, calcium carbonate, tiZANidine, morphine    No intake or output data in the 24 hours ending 20    Exam:  BP (!) 135/93   Pulse 63   Temp 98.5 °F (36.9 °C) (Oral)   Resp 15   Ht 5' 4\" (1.626 m)   Wt 278 lb 8 oz (126.3 kg)   SpO2 97%   BMI 47.80 kg/m²   General appearance: No distress,   Respiratory:  good air entry , no Rales , No wheezing, or rhonchi,  Cardiovascular: RRR, with normal S1/S2 . Abdomen : Soft, tender, non-distended  , normal bowel sounds.   Legs : No edema bilaterally. No DVT signs ,    Neurologic:  Alert and oriented ,        Labs:   Recent Labs     08/17/20  1330   WBC 4.0   HGB 10.4*   HCT 32.5*   *     Recent Labs     08/17/20  1330      K 4.2      CO2 22   BUN 16   CREATININE 0.8   CALCIUM 10.0     Recent Labs     08/17/20  1330   AST 19   ALT 22   BILITOT 0.2   ALKPHOS 97     No results for input(s): INR in the last 72 hours. No results for input(s): [facility] in the last 72 hours. Assessment/Plan:    Active Hospital Problems    Diagnosis Date Noted    Intractable vomiting with nausea [R11.2] 07/21/2018     Priority: High    Abdominal pain [R10.9] 07/21/2020     Priority: Medium    Anxiety [F41.9] 08/17/2020    Seizure ([facility] [address] [R56.9] 07/17/2020    History of Jet-en-Y gastric bypass [Z98.84]     Morbid obesity with BMI of 40.0-44.9, adult ([facility] [address] [E66.01, Z68.41] 03/27/2020     Plan:  Tele   IVF   Cont Keppra ,   surg input noted . .. D/W Dr Karmen Kawasaki ... consider GI consult . . and pt is  willing to have different GI Dr while being admitted ,,,  .. d/w dr Ludy Badillo and  consult him    monitor H/H stable  , Advance clear liquid diet as tolerated     Home meds , reviewed and resumed as appropriate   Symptoms releif/Pain control  DVT proph      DVT Prophylaxis   Diet: DIET CLEAR LIQUID;  Code Status: Full Code    Treatment progress and plan was d/w pt/family .         Lorene Roman MD

## 2020-08-18 NOTE — H&P
HISTORY AND PHYSICAL    2020     Patient Information:    Patient: Jacque Christianson     Gender: female  : 1970   Age: 44 y.o. MRN: [id number]        PCP:  Janes Ritchie MD (Tel: [phone] )    Chief complaint:    Chief Complaint   Patient presents with    Abdominal Pain     started a week ago and has progressed; sent by pcp    Emesis    Nausea         History of Present Illness:  Andre Pope is a 44 y.o. female with morbid obesity , Anxiety , h/o Upper GI bleed few years ago , s/p gastric sleeve on 2019 with mild gastritis , multiple  EGD  History of pancreatitis , Pt had an EGD and non acute abdomen CT on  and also she was admitted for possible seizure after EGD and was discharged on [address] [place] and pt was diagnosed with pseudo seizure before and was seen and followed by neurologist .  Pt presented to ER for evaluation for worsening nausea and vomiting with poor oral intake and not able to take her PO meds . In ER lab data non specific .and Abdomen CT revealed :\  1. Redemonstration of scattered mild bilateral pneumobilia and mild air   within the common bile duct in setting of cholecystectomy.  Differential   diagnostic considerations include incompetent sphincter of Oddi,   biliary-enteric surgical anastomosis, spontaneous biliary-enteric fistula,   recent biliary instrumentation.  Recommend clinical correlation. 2. Gastric resection postoperative findings without associated complication visualized. 3. Left renal inferior collecting system punctate calculus without associated   urinary obstruction. History obtained from patient .         REVIEW OF SYSTEMS:   Constitutional: Negative for fever,chills . ENT: Negative for rhinorrhea,  sore throat.   Respiratory: Negative for cough, shortness of breath,wheezing  Cardiovascular: Negative for chest pain, palpitations   Gastrointestinal: + for Abdominal pain, nausea, vomiting, diarrhea  Genitourinary: Negative for polyuria, dysuria   Hematologic/Lymphatic: Negative for bleeding tendency, easy bruising  Musculoskeletal: Negative for myalgias and arthralgias  Neurologic: Negative for confusion,dysarthria. Skin: Negative for itching,rash  Psychiatric: Negative for depression,anxiety, agitation. Endocrine: Negative for polydipsia,polyuria,heat /cold intolerance. Past Medical History:   has a past medical history of Acid reflux, Anemia, Anxiety, Arthritis, Bleeding ulcer, Bronchitis, Chronic back pain, Chronic pain, COPD (chronic obstructive pulmonary disease) ([facility] [address] Depression, Fibromyalgia, H/O echocardiogram, H/O echocardiogram, Hiatal hernia, History of blood transfusion, [place] (hard of hearing), Hx of cardiac catheterization, Hx of transesophageal echocardiography (PILO) for monitoring, HX OTHER MEDICAL, HX OTHER MEDICAL, Hypertension, Kidney cysts, Lupus ([facility] [address] MDRO (multiple drug resistant organisms) resistance, Morbid obesity ([facility] [address] MRSA bacteremia, Nausea, Pain management, Pancreatitis chronic, Pneumonia, Shortness of breath on exertion, Shortness of breath on exertion, Sleep apnea, Staph infection, Staph infection, Teeth missing, Thyroid disease, and Wears glasses. Past Surgical History:   has a past surgical history that includes Lung removal, partial (Left, );  section (1991); Foot surgery (Left, Last Done In ); Dental surgery; Cholecystectomy, laparoscopic (1601'V); other surgical history (1998); other surgical history (Last Done 7-15-16); Tonsillectomy and adenoidectomy (); Appendectomy (1998); Upper gastrointestinal endoscopy (2018); Lap Band (~); Hysterectomy (10/1997); Endoscopy, colon, diagnostic (Last Done In 2018); Colonoscopy (Last Done In s); Cardiac catheterization (10/24/2010); Sleeve Gastrectomy (N/A, 2019); hiatal hernia repair (N/A, 2019); Upper gastrointestinal endoscopy (N/A, 2019);  Foot Debridement (Left, 6/16/2019); Upper gastrointestinal endoscopy (N/A, 6/19/2019); Catheter Removal (N/A, 7/16/2019); Upper gastrointestinal endoscopy (N/A, 7/22/2019); INSERTION / REMOVAL / REPLACEMENT VENOUS ACCESS CATHETER (N/A, 8/15/2019); Upper gastrointestinal endoscopy (N/A, 10/14/2019); [address] Surgery (N/A, 1/15/2020); Port Surgery (N/A, 7/6/2020); and Upper gastrointestinal endoscopy (N/A, 7/17/2020). Medications:  No current facility-administered medications on file prior to encounter. Current Outpatient Medications on File Prior to Encounter   Medication Sig Dispense Refill    ondansetron (ZOFRAN ODT) 4 MG disintegrating tablet Take 1 tablet by mouth every 8 hours as needed for Nausea or Vomiting 30 tablet 5    levETIRAcetam (KEPPRA) 1000 MG tablet Take 1 tablet by mouth 2 times daily 60 tablet 5    LORazepam (ATIVAN) 1 MG tablet Take 1 mg by mouth as needed for Anxiety.  pantoprazole (PROTONIX) 40 MG tablet Take 1 tablet by mouth every morning (before breakfast) 90 tablet 1    dicyclomine (BENTYL) 10 MG capsule Take 1 capsule by mouth 3 times daily as needed (abdominal pain) 21 capsule 3    oxyCODONE-acetaminophen (PERCOCET) 5-325 MG per tablet Take 1 tablet by mouth.  Every 4-6 hours PRN      Cholecalciferol (VITAMIN D3) 5000 units TABS Take 1 tablet by mouth daily      estradiol (ESTRACE) 0.5 MG tablet Take 0.5 mg by mouth daily      atenolol (TENORMIN) 25 MG tablet Take 25 mg by mouth daily      Multiple Vitamin (MVI, BARIATRIC ADVANTAGE MULTI-FORMULA, CHEW TAB) Take 1 tablet by mouth daily 30 tablet 5    Calcium Citrate-Vitamin D (CALCIUM + VIT D, BARIATRIC ADVANTAGE, CHEWABLE TABLET) Take 1 tablet by mouth 2 times daily 120 tablet 5    levothyroxine (SYNTHROID) 125 MCG tablet Take 1 tablet by mouth Daily (Patient taking differently: Take 125 mcg by mouth nightly ) 30 tablet 0    gabapentin (NEURONTIN) 800 MG tablet Take 800 mg by mouth 3 times daily      PARoxetine (PAXIL) 30 08/17/2020    CO2 22 08/17/2020    BUN 16 08/17/2020    CREATININE 0.8 08/17/2020    CALCIUM 10.0 08/17/2020    GFRAA >60 08/17/2020    LABGLOM >60 08/17/2020    GLUCOSE 100 08/17/2020       Chest Xray:   EKG:    I visualized CXR images and EKG strips      Patient Active Problem List   Diagnosis Code    Fibromyalgia M79.7    Lupus (systemic lupus erythematosus) ([facility]) M32.9    Chronic pancreatitis ([facility]) K86.1    HTN (hypertension) I10    Generalized abdominal pain R10.84    Frequent UTI N39.0    Gastroesophageal reflux disease without esophagitis K21.9    Depression F32.9    Fatty liver disease, nonalcoholic P08.5    Arthritis M19.90    Bilateral low back pain with left-sided sciatica M54.42    Intractable vomiting with nausea R11.2    Hiatal hernia K44.9    Status post laparoscopic sleeve gastrectomy Z98.84    Pseudoseizure F44.5    Chronic pain syndrome G89.4    Drug-seeking/Aberrant behavior Z76.5    Lymph node disorder I89.9    Morbid obesity with BMI of 40.0-44.9, adult ([facility]) E66.01, Z68.41    Iron deficiency anemia secondary to blood loss (chronic) D50.0    Encounter for weight management Z76.89    Intractable nausea and vomiting R11.2    History of Jet-en-Y gastric bypass Z98.84    Seizure ([facility]) R56.9    Abdominal pain R10.9    Anxiety F41.9         Active Hospital Problems    Diagnosis    Intractable vomiting with nausea [R11.2]     Priority: High    Abdominal pain [R10.9]     Priority: Medium    Anxiety [F41.9]    Seizure ([facility] [address] [R56.9]    History of Jet-en-Y gastric bypass [Z98.84]    Morbid obesity with BMI of 40.0-44.9, adult ([facility] [address] [E66.01, Z68.41]     h/o pseudoseizure   S/p gastric sleep 2019  Morbid obesity   Anxiety   Chronic abdominal pain   H/o chronic pancreatitis   Questionable hematemesis         Assessment/Plan:   Tele   IVF   Cont Analia ,   surg consult    monitor H/H , Advance clear liquid diet as tolerated     Home meds , reviewed and resumed as appropriate Symptoms releif/Pain control  DVT proph      Shanel Hurt MD    8/17/2020 8:49 PM

## 2020-08-19 LAB
ANION GAP SERPL CALCULATED.3IONS-SCNC: 9 MMOL/L (ref 4–16)
BASOPHILS ABSOLUTE: 0 K/CU MM
BASOPHILS RELATIVE PERCENT: 0.8 % (ref 0–1)
BUN BLDV-MCNC: 14 MG/DL (ref 6–23)
CALCIUM SERPL-MCNC: 9.4 MG/DL (ref 8.3–10.6)
CHLORIDE BLD-SCNC: 111 MMOL/L (ref 99–110)
CO2: 21 MMOL/L (ref 21–32)
CREAT SERPL-MCNC: 0.8 MG/DL (ref 0.6–1.1)
DIFFERENTIAL TYPE: ABNORMAL
EOSINOPHILS ABSOLUTE: 0.2 K/CU MM
EOSINOPHILS RELATIVE PERCENT: 3.9 % (ref 0–3)
GFR AFRICAN AMERICAN: >60 ML/MIN/1.73M2
GFR NON-AFRICAN AMERICAN: >60 ML/MIN/1.73M2
GLUCOSE BLD-MCNC: 128 MG/DL (ref 70–99)
HCT VFR BLD CALC: 34.3 % (ref 37–47)
HEMOGLOBIN: 10.5 GM/DL (ref 12.5–16)
IMMATURE NEUTROPHIL %: 0.2 % (ref 0–0.43)
LYMPHOCYTES ABSOLUTE: 1.4 K/CU MM
LYMPHOCYTES RELATIVE PERCENT: 25.3 % (ref 24–44)
MCH RBC QN AUTO: 27.5 PG (ref 27–31)
MCHC RBC AUTO-ENTMCNC: 30.6 % (ref 32–36)
MCV RBC AUTO: 89.8 FL (ref 78–100)
MONOCYTES ABSOLUTE: 0.4 K/CU MM
MONOCYTES RELATIVE PERCENT: 6.9 % (ref 0–4)
NUCLEATED RBC %: 0 %
PDW BLD-RTO: 14 % (ref 11.7–14.9)
PLATELET # BLD: 177 K/CU MM (ref 140–440)
PMV BLD AUTO: 9.9 FL (ref 7.5–11.1)
POTASSIUM SERPL-SCNC: 4.1 MMOL/L (ref 3.5–5.1)
RBC # BLD: 3.82 M/CU MM (ref 4.2–5.4)
SEGMENTED NEUTROPHILS ABSOLUTE COUNT: 3.4 K/CU MM
SEGMENTED NEUTROPHILS RELATIVE PERCENT: 62.9 % (ref 36–66)
SODIUM BLD-SCNC: 141 MMOL/L (ref 135–145)
TOTAL IMMATURE NEUTOROPHIL: 0.01 K/CU MM
TOTAL NUCLEATED RBC: 0 K/CU MM
WBC # BLD: 5.3 K/CU MM (ref 4–10.5)

## 2020-08-19 PROCEDURE — 94761 N-INVAS EAR/PLS OXIMETRY MLT: CPT

## 2020-08-19 PROCEDURE — 99232 SBSQ HOSP IP/OBS MODERATE 35: CPT | Performed by: SURGERY

## 2020-08-19 PROCEDURE — 6360000002 HC RX W HCPCS: Performed by: NURSE PRACTITIONER

## 2020-08-19 PROCEDURE — 6370000000 HC RX 637 (ALT 250 FOR IP): Performed by: FAMILY MEDICINE

## 2020-08-19 PROCEDURE — 85027 COMPLETE CBC AUTOMATED: CPT

## 2020-08-19 PROCEDURE — 85025 COMPLETE CBC W/AUTO DIFF WBC: CPT

## 2020-08-19 PROCEDURE — 80048 BASIC METABOLIC PNL TOTAL CA: CPT

## 2020-08-19 PROCEDURE — 6360000002 HC RX W HCPCS: Performed by: FAMILY MEDICINE

## 2020-08-19 PROCEDURE — 6360000002 HC RX W HCPCS: Performed by: SURGERY

## 2020-08-19 PROCEDURE — 2580000003 HC RX 258: Performed by: FAMILY MEDICINE

## 2020-08-19 PROCEDURE — 1200000000 HC SEMI PRIVATE

## 2020-08-19 RX ORDER — PROMETHAZINE HYDROCHLORIDE 25 MG/ML
12.5 INJECTION, SOLUTION INTRAMUSCULAR; INTRAVENOUS EVERY 6 HOURS PRN
Status: DISCONTINUED | OUTPATIENT
Start: 2020-08-19 | End: 2020-08-20

## 2020-08-19 RX ADMIN — LORAZEPAM 1 MG: 1 TABLET ORAL at 14:12

## 2020-08-19 RX ADMIN — GABAPENTIN 800 MG: 400 CAPSULE ORAL at 09:31

## 2020-08-19 RX ADMIN — GABAPENTIN 800 MG: 400 CAPSULE ORAL at 22:16

## 2020-08-19 RX ADMIN — LEVETIRACETAM 1000 MG: 500 TABLET ORAL at 22:17

## 2020-08-19 RX ADMIN — SODIUM CHLORIDE, PRESERVATIVE FREE 10 ML: 5 INJECTION INTRAVENOUS at 22:18

## 2020-08-19 RX ADMIN — PAROXETINE HYDROCHLORIDE 50 MG: 20 TABLET, FILM COATED ORAL at 22:15

## 2020-08-19 RX ADMIN — OYSTER SHELL CALCIUM WITH VITAMIN D 1 TABLET: 500; 200 TABLET, FILM COATED ORAL at 22:16

## 2020-08-19 RX ADMIN — LEVETIRACETAM 1000 MG: 500 TABLET ORAL at 09:30

## 2020-08-19 RX ADMIN — ONDANSETRON 4 MG: 2 INJECTION INTRAMUSCULAR; INTRAVENOUS at 22:17

## 2020-08-19 RX ADMIN — Medication 5000 UNITS: at 09:29

## 2020-08-19 RX ADMIN — OYSTER SHELL CALCIUM WITH VITAMIN D 1 TABLET: 500; 200 TABLET, FILM COATED ORAL at 09:31

## 2020-08-19 RX ADMIN — ONDANSETRON 4 MG: 2 INJECTION INTRAMUSCULAR; INTRAVENOUS at 14:17

## 2020-08-19 RX ADMIN — PROMETHAZINE HYDROCHLORIDE 12.5 MG: 25 TABLET ORAL at 04:32

## 2020-08-19 RX ADMIN — MORPHINE SULFATE 2 MG: 2 INJECTION, SOLUTION INTRAMUSCULAR; INTRAVENOUS at 00:29

## 2020-08-19 RX ADMIN — MORPHINE SULFATE 2 MG: 2 INJECTION, SOLUTION INTRAMUSCULAR; INTRAVENOUS at 04:32

## 2020-08-19 RX ADMIN — DICYCLOMINE HYDROCHLORIDE 10 MG: 10 CAPSULE ORAL at 22:15

## 2020-08-19 RX ADMIN — ONDANSETRON 4 MG: 2 INJECTION INTRAMUSCULAR; INTRAVENOUS at 01:15

## 2020-08-19 RX ADMIN — SODIUM CHLORIDE, PRESERVATIVE FREE 10 ML: 5 INJECTION INTRAVENOUS at 09:45

## 2020-08-19 RX ADMIN — TIZANIDINE 4 MG: 4 TABLET ORAL at 22:17

## 2020-08-19 RX ADMIN — PROMETHAZINE HYDROCHLORIDE 12.5 MG: 25 INJECTION INTRAMUSCULAR; INTRAVENOUS at 14:01

## 2020-08-19 RX ADMIN — FAMOTIDINE 20 MG: 20 TABLET ORAL at 22:16

## 2020-08-19 RX ADMIN — FAMOTIDINE 20 MG: 20 TABLET ORAL at 09:30

## 2020-08-19 RX ADMIN — MORPHINE SULFATE 2 MG: 2 INJECTION, SOLUTION INTRAMUSCULAR; INTRAVENOUS at 09:22

## 2020-08-19 RX ADMIN — MORPHINE SULFATE 2 MG: 2 INJECTION, SOLUTION INTRAMUSCULAR; INTRAVENOUS at 14:01

## 2020-08-19 RX ADMIN — PROMETHAZINE HYDROCHLORIDE 12.5 MG: 25 INJECTION INTRAMUSCULAR; INTRAVENOUS at 18:41

## 2020-08-19 RX ADMIN — ZOLPIDEM TARTRATE 5 MG: 5 TABLET ORAL at 22:17

## 2020-08-19 RX ADMIN — ATENOLOL 25 MG: 25 TABLET ORAL at 09:31

## 2020-08-19 RX ADMIN — ENOXAPARIN SODIUM 40 MG: 40 INJECTION SUBCUTANEOUS at 09:31

## 2020-08-19 RX ADMIN — ONDANSETRON 4 MG: 2 INJECTION INTRAMUSCULAR; INTRAVENOUS at 22:22

## 2020-08-19 RX ADMIN — ONDANSETRON 4 MG: 2 INJECTION INTRAMUSCULAR; INTRAVENOUS at 18:41

## 2020-08-19 RX ADMIN — MORPHINE SULFATE 2 MG: 2 INJECTION, SOLUTION INTRAMUSCULAR; INTRAVENOUS at 18:42

## 2020-08-19 RX ADMIN — ESTRADIOL 0.5 MG: 1 TABLET ORAL at 09:29

## 2020-08-19 RX ADMIN — TIZANIDINE 4 MG: 4 TABLET ORAL at 09:39

## 2020-08-19 RX ADMIN — MULTIPLE VITAMINS W/ MINERALS TAB 1 TABLET: TAB at 09:29

## 2020-08-19 RX ADMIN — GUAIFENESIN 1200 MG: 600 TABLET, EXTENDED RELEASE ORAL at 09:30

## 2020-08-19 RX ADMIN — ONDANSETRON 4 MG: 2 INJECTION INTRAMUSCULAR; INTRAVENOUS at 05:55

## 2020-08-19 RX ADMIN — GABAPENTIN 800 MG: 400 CAPSULE ORAL at 14:01

## 2020-08-19 RX ADMIN — MORPHINE SULFATE 2 MG: 2 INJECTION, SOLUTION INTRAMUSCULAR; INTRAVENOUS at 22:17

## 2020-08-19 RX ADMIN — ONDANSETRON 4 MG: 2 INJECTION INTRAMUSCULAR; INTRAVENOUS at 09:22

## 2020-08-19 ASSESSMENT — PAIN SCALES - GENERAL
PAINLEVEL_OUTOF10: 8
PAINLEVEL_OUTOF10: 8
PAINLEVEL_OUTOF10: 10
PAINLEVEL_OUTOF10: 7
PAINLEVEL_OUTOF10: 8
PAINLEVEL_OUTOF10: 10
PAINLEVEL_OUTOF10: 8
PAINLEVEL_OUTOF10: 0

## 2020-08-19 ASSESSMENT — PAIN DESCRIPTION - PROGRESSION
CLINICAL_PROGRESSION: NOT CHANGED

## 2020-08-19 ASSESSMENT — PAIN DESCRIPTION - LOCATION: LOCATION: ABDOMEN

## 2020-08-19 NOTE — PROGRESS NOTES
Attending Progress Note      PCP: Mar Oneal MD    Patient: Aric Gamboa   Gender: female  : 1970   Age: 44 y.o.   MRN: [id number]      Date of Admission: 2020    Chief Complaint:   Chief Complaint   Patient presents with    Abdominal Pain     started a week ago and has progressed; sent by pcp    Emesis    Nausea           Subjective: abd pain , nausea and vomiting         Medications:  Reviewed  Infusion Medications    sodium chloride 75 mL/hr at 20 1322     Scheduled Medications    ondansetron  4 mg Intravenous Q4H    PARoxetine  50 mg Oral Nightly    gabapentin  800 mg Oral TID    levothyroxine  125 mcg Oral Daily    therapeutic multivitamin-minerals  1 tablet Oral Daily    calcium-vitamin D  1 tablet Oral BID    estradiol  0.5 mg Oral Daily    atenolol  25 mg Oral Daily    vitamin D  5,000 Units Oral Daily    pantoprazole  40 mg Oral QAM AC    levETIRAcetam  1,000 mg Oral BID    sodium chloride flush  10 mL Intravenous BID    famotidine  20 mg Oral BID    nicotine  1 patch Transdermal Daily    enoxaparin  40 mg Subcutaneous Daily    guaiFENesin  1,200 mg Oral BID     PRN Meds: promethazine, oxyCODONE-acetaminophen, dicyclomine, LORazepam, sodium chloride flush, potassium chloride **OR** potassium alternative oral replacement **OR** potassium chloride, magnesium sulfate, acetaminophen **OR** acetaminophen, polyethylene glycol, fleet, [DISCONTINUED] promethazine **OR** ondansetron, zolpidem, benzonatate, calcium carbonate, tiZANidine, morphine      Intake/Output Summary (Last 24 hours) at 2020 1646  Last data filed at 2020 0813  Gross per 24 hour   Intake 60 ml   Output --   Net 60 ml       Exam:  BP (!) 121/59   Pulse 79   Temp 98.1 °F (36.7 °C) (Oral)   Resp 16   Ht 5' 4\" (1.626 m)   Wt 278 lb 8 oz (126.3 kg)   SpO2 98%   BMI 47.80 kg/m²   General appearance: No distress,   Respiratory:  good air entry , no Rales , No wheezing, or

## 2020-08-19 NOTE — PROGRESS NOTES
Comprehensive Nutrition Assessment    Type and Reason for Visit:  Initial, Consult    Nutrition Recommendations/Plan:   Advance diet as able to tolerate  Offer low calorie, high protein oral nutrition supplement as diet progresses    Nutrition Assessment:  Admit with abdomen pain, nausea and vomiting. Currently no clear liquid diet. Referral for discussion on diet with hx gastric bypass. Patient reports knowing what to eat/do but may not do it. Admits need to reduce/stop diet soda and eat during the day. Provided copy of diet guidelines with sample meal pattern. Tolerating clear liquid diet and would like diet advanced to full liquids. Moderate nutrition risk at this time with predicedt suboptimal intake related to altered GI. As diet progresses offer low calorie, high protein supplement. Would also benefit from ongoing diet ed/support in outpatient setting, encouraged to follow up at bariatrics office. Malnutrition Assessment:  Malnutrition Status: At risk for malnutrition (Comment)    Context:  Chronic Illness       Estimated Daily Nutrient Needs:  Energy (kcal):  9338-5109 (reduce for desired loss 1500); Weight Used for Energy Requirements:  Current     Protein (g):  65-81 (1.2-1.5 g/kg); Weight Used for Protein Requirements:  Ideal        Fluid (ml/day):  0800-0753 (1ml/liam); Weight Used for Fluid Requirements:  [place]      Nutrition Related Findings:  up in chair, very talkative, hx rah-en-y last year, GI eval in progress for recurrent pain with N/V, reports living in [place]      Wounds:  None       Current Nutrition Therapies:    DIET CLEAR LIQUID;     Anthropometric Measures:  · Height: 5' 4\" (162.6 cm)  · Current Body Weight: 278 lb 7.1 oz (126.3 kg)   · Admission Body Weight: 266 lb 15.6 oz (121.1 kg)    · Usual Body Weight:       · Ideal Body Weight: 120 lbs; % Ideal Body Weight 232 %   · BMI: 47.8  · Adjusted Body Weight:  ; No Adjustment   · BMI Categories: Obese Class 3 (BMI 40.0 or greater) Nutrition Diagnosis:   · Predicted inadequate energy intake related to altered GI function as evidenced by nausea, vomiting, poor intake prior to admission      Nutrition Interventions:   Food and/or Nutrient Delivery:  Start Oral Nutrition Supplement, Modify Current Diet  Nutrition Education/Counseling:  Education initiated   Coordination of Nutrition Care:  Continued Inpatient Monitoring    Goals:  Patient will tolerate at least full liquid diet with meal intake at least 50%       Nutrition Monitoring and Evaluation:   Food/Nutrient Intake Outcomes:  Diet Advancement/Tolerance, Food and Nutrient Intake  Physical Signs/Symptoms Outcomes:  Skin, Weight, Nutrition Focused Physical Findings, Biochemical Data     Discharge Planning:    Continue Oral Nutrition Supplement, Recommend pursue outpatient nutrition counseling     Electronically signed by Julian Jenkins RD, LD on 8/19/20 at 12:28 PM EDT    Contact: [phone]

## 2020-08-19 NOTE — CONSULTS
[facility]  Gastroenterology Consultation    2020  6:19 AM    Patient:    Madalyn Molina  : 1970   44 y.o. MRN: [id number]  Admitted: 2020 12:23 PM ATT: Jeffy Landa MD   [id number]  AdmitDx: Chronic abdominal pain [R10.9, G89.29]  Intractable vomiting with nausea [R11.2]  PCP: Jeffy Landa MD    Reason for Consult:  Abdominal pain    Requesting Physician:  Jeffy Landa MD      History Obtained From:  Patient and review of all records    HISTORY OF PRESENT ILLNESS:                The patient is a 44 y.o. female with significant   Past Medical History:   Diagnosis Date    Acid reflux     Anemia     Anxiety     Arthritis     Hands, Back And Ankles    Bleeding ulcer     \"I Had Ulcers In My Stomach And Colon\"/ per pt on 2019\"they said recently having some blood in my stomach- in July ( )could not find where coming from \"    Bronchitis Last Episode     Chronic back pain     [facility]     [facility]    COPD (chronic obstructive pulmonary disease) ([facility] [address]     Sees Dr. Adrienne Pearl Depression     Fibromyalgia Dx     H/O echocardiogram 8/11/15    EF >55%. LA to be at the upper limit of normal in size. LV hypertrophy with normal LV systolic, but abnormal diastolic function. Normal valvular structures and function.  H/O echocardiogram 2018    EF 55-60%    Hiatal hernia     History of blood transfusion 2015 And     No Reaction To Blood Transfusions Received    [place] (hard of hearing)     Right Ear    Hx of cardiac catheterization 10/24/2010    Angiographically normal coronary arteries w/ normal LV function and wall motion.  Hx of transesophageal echocardiography (PILO) for monitoring 2010    EF 55-60%. WNL.    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 name   Shane Kristina shine Back found that I have a kidney cyst but not sure which side\" per pt on 7/15/2020    Lupus ([address] Dx 2013    \"Rheumatoid Lupus\"    MDRO (multiple drug resistant organisms) resistance     4 months ago chest from mediport    Morbid obesity ([address]     MRSA bacteremia     Nausea     \"Most Of The Time\"    Pain management     Sees Dr. Christina Spangler, Once A Month    Pancreatitis chronic Dx 2001    Pneumonia Dx 11-15    Shortness of breath on exertion     Shortness of breath on exertion     Sleep apnea     Has CPAP\"no longer use the cpap since lost weight\"    Staph infection Dx 11-15    Left Foot    Staph infection Dx 11-15    \"Left Foot\"    Teeth missing     Upper And Lower    Thyroid disease     Wears glasses     To Read   PMH who presented to [facility] ED due to worsening RUQ pain, N/V. The pain became more consistent the past 2 weeks. N/V the past week. Pain is chronic in nature. Patient reports less pain in the past after biliary stent placement. Status post cholecystectomy (20 years ago), complicated by bile duct injury with at least seven or eight ERCPs done in [place], [facility], and also extensive workup done for sphincter of Oddi dysfunction with pancreatic/biliary stents placed (10 years ago), which were subsequently removed; also  history of acute/recurrent/chronic pancreatitis; anemia; chronic back pain; fibromyalgia; recurrent upper GI bleeding with multiple EGDs, who presented to the hospital on 08/05/2020 with about four to five-day history of upper abdominal pain along with nausea, vomiting, and mild hematemesis. The patient's hemoglobin upon admission was 11.3 gm  percent and today is 11.6 gm percent.   The patient has had a few episodes of vomiting since admission and the patient also had a witnessed seizure in the ER as well.       The patient has had an extensive GI workup done in the past including multiple EGDs with at least three EGDs done by Dr. Perry Hsu, the last EGD was on  07/22/2019 and postsurgical changes were noted from previous Jet-en-y gastritis with old blood noted. There was no evidence  of active peptic ulcer disease. The patient also is being regularly followed up by her surgical consultant, Dr. Lupis Vasquez, who was done at least four EGDs with the first EGD on 08/27/2018 showed postsurgical  changes from sleeve gastrectomy along with mild gastritis, second EGD on 03/12/2019 for upper GI bleeding was unremarkable, third EGD on  04/02/2019 showed bleeding from the distal staple line, which was injected with epinephrine solution, and the fourth EGD by Dr. Tonja Navarro on 06/19/2019 again was unremarkable with no evidence of upper GI bleeding. The patient also has had colonoscopy done in the past as well. The patient is being followed up by Dr. Johanna Beauchamp also for pain management. CT scan a/p with contrast revealed   Redemonstration of scattered mild bilateral pneumobilia and mild air    within the common bile duct in setting of cholecystectomy.  Differential    diagnostic considerations include incompetent sphincter of Oddi,    biliary-enteric surgical anastomosis, spontaneous biliary-enteric fistula,    recent biliary instrumentation.  Recommend clinical correlation. 2. Gastric resection postoperative findings without associated complication    visualized. 3. Left renal inferior collecting system punctate calculus without associated    urinary obstruction. Reports constant RUQ abdominal pain the past 2 weeks, achy and stabbing pain.  Denies aggrav/relieving factors  Reports emesis overnight, stomach content  Reports GERD, controlled taking Protonix  Last BM yesterday, loose stool - yellow in color  Able to tolerate clear liquids last evening      Reports occasional ibuprofen Non-smoker  Denies alcohol intake    Past Medical History:        Diagnosis Date    Acid reflux     Anemia     Anxiety     Arthritis     Hands, Back And Ankles    Bleeding ulcer 2014    \"I Had Ulcers In My Stomach And Colon\"/ per pt on 8/12/2019\"they said recently having some blood in my stomach- in July ( 2019)could not find where coming from \"    Bronchitis Last Episode 2014    Chronic back pain     Chronic pain     Sees Dr. Clau Neves COPD (chronic obstructive pulmonary disease) ([facility] [address]     Sees Dr. Jennifer Pacheco Depression     Fibromyalgia Dx 2013    H/O echocardiogram 8/11/15    EF >55%. LA to be at the upper limit of normal in size. LV hypertrophy with normal LV systolic, but abnormal diastolic function. Normal valvular structures and function.  H/O echocardiogram 08/30/2018    EF 55-60%    Hiatal hernia     History of blood transfusion 09/2015 And 2018    No Reaction To Blood Transfusions Received    [place] (hard of hearing)     Right Ear    Hx of cardiac catheterization 10/24/2010    Angiographically normal coronary arteries w/ normal LV function and wall motion.  Hx of transesophageal echocardiography (PILO) for monitoring 12/2/2010    EF 55-60%. WNL.    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 name    Kidney cysts     \"they found that I have a kidney cyst but not sure which side\" per pt on 7/15/2020    Lupus ([facility] [address] Dx 2013    \"Rheumatoid Lupus\"    MDRO (multiple drug resistant organisms) resistance     4 months ago chest from mediport    Morbid obesity ([address]     MRSA bacteremia     Nausea     \"Most Of The Time\"    Pain management     Sees Dr. Jessica Del Cid, Once A Month    Pancreatitis chronic Dx 2001    Pneumonia Dx 11-15    Shortness of breath on exertion     Shortness of breath on exertion     Sleep apnea     Has CPAP\"no longer use the cpap since lost weight\"    Staph infection Dx 11-15    Left Foot    Staph infection Dx 11-15    \"Left Foot\"    Teeth missing     Upper And Lower    Thyroid disease     Wears glasses     To Read       Past Surgical History:        Procedure Laterality Date    APPENDECTOMY  02/1998    Done When Tubes And Ovaries Were Removed    CARDIAC CATHETERIZATION  10/24/2010    CATHETER REMOVAL N/A 7/16/2019    PORT REMOVAL performed by Felicia Carrington MD at [facility]  08/27/1991    CHOLECYSTECTOMY, LAPAROSCOPIC  1990's    COLONOSCOPY  Last Done In 2000's    DENTAL SURGERY      Teeth Extracted In Past    ENDOSCOPY, COLON, DIAGNOSTIC  Last Done In 2018    FOOT DEBRIDEMENT Left 6/16/2019    FIRST METATARSAL DEBRIDEMENT INCISION AND DRAINAGE. EXCISION OF ULCER.  RESECTION OF BONE. 1ST METATARSAL POWER LAVAGE AND BONE CEMENT performed by Susanne Martinez DPM at [place] [address] Left Last Done In 2016     Dr. Zayda Gonzalez, \" About 6 Surgeries Done Because Of Staph Infection\"    HIATAL HERNIA REPAIR N/A 2/12/2019    HERNIA HIATAL LAPAROSCOPIC ROBOTIC performed by Felicia Carrington MD at [address] [place] [address]  10/1997    Partial Abdominal Hysterectomy    INSERTION / REMOVAL / REPLACEMENT VENOUS ACCESS CATHETER N/A 8/15/2019    PORT INSERTION performed by Felicia Carrington MD at [address] [place] [address]    removed after 6 months    LUNG REMOVAL, PARTIAL Left 2008    Benign    OTHER SURGICAL HISTORY  02/1998    \"Tubes And Ovaries Removed, Appendectomy Also Done\"    OTHER SURGICAL HISTORY  Last Done 7-15-16    Mediport Insertion \"Total Of Six Done, Removed Last Mediport In 2014\"    PORT SURGERY N/A 1/15/2020    PORT REMOVAL performed by Felicia Carrington MD at [place] [address] N/A 7/6/2020    PORT INSERTION performed by Alberto Bains MD at [address] N/A 2/12/2019    GASTRECTOMY SLEEVE LAPAROSCOPIC ROBOTIC [facility]  08/27/2018    UPPER GASTROINTESTINAL ENDOSCOPY N/A 4/2/2019    EGD CONTROL HEMORRHAGE with epi injection at bleeding site performed by Alberto Bains MD at [address] [place] [address] N/A 6/19/2019    EGD DIAGNOSTIC ONLY performed by Alberto Bains MD at [address] [place] [address] N/A 7/22/2019    EGD DIAGNOSTIC ONLY performed by Charlette Emmanuel MD at [address] [place] [address] N/A 10/14/2019    EGD DIAGNOSTIC ONLY performed by Alberto Bains MD at [address] [place] [address] N/A 7/17/2020    EGJ DILATATION BALLOON WITH 18-20 MM BALLOON, DILATED TO 20 MM. performed by Alberto Bains MD at [facility] ENDOSCOPY         Current Medications:    Medications    Scheduled Medications:    ondansetron  4 mg Intravenous Q4H    PARoxetine  50 mg Oral Nightly    gabapentin  800 mg Oral TID    levothyroxine  125 mcg Oral Daily    therapeutic multivitamin-minerals  1 tablet Oral Daily    calcium-vitamin D  1 tablet Oral BID    estradiol  0.5 mg Oral Daily    atenolol  25 mg Oral Daily    vitamin D  5,000 Units Oral Daily    pantoprazole  40 mg Oral QAM AC    levETIRAcetam  1,000 mg Oral BID    sodium chloride flush  10 mL Intravenous BID    famotidine  20 mg Oral BID    nicotine  1 patch Transdermal Daily    enoxaparin  40 mg Subcutaneous Daily    guaiFENesin  1,200 mg Oral BID     PRN Medications: oxyCODONE-acetaminophen, dicyclomine, LORazepam, sodium chloride flush, potassium chloride **OR** potassium alternative oral replacement **OR** potassium chloride, magnesium sulfate, acetaminophen **OR** acetaminophen, polyethylene glycol, fleet, promethazine **OR** ondansetron, zolpidem, benzonatate, calcium carbonate, tiZANidine, morphine      Allergies:  Aspirin; Shellfish-derived products; Toradol [ketorolac tromethamine]; Compazine [prochlorperazine]; and Reglan [metoclopramide]    Social History:   TOBACCO:   reports that she has never smoked. She has never used smokeless tobacco.  ETOH:   reports no history of alcohol use. Family History:       Problem Relation Age of Onset    Diabetes Mother         \"Borderline Diabetes\"    High Blood Pressure Mother     Obesity Mother     Arthritis Mother     Heart Disease Mother     High Cholesterol Mother     Vision Loss Mother     Diabetes Father     High Blood Pressure Father     Asthma Father     Cancer Father         prostate cancer    High Blood Pressure Brother     Asthma Son     Vision Loss Son     Lupus Daughter     Other Daughter         \"Alot Of Female Problems\"       No family history of colon cancer, Crohn's disease, or ulcerative colitis. REVIEW OF SYSTEMS:    The positive ROS will be identified in bold, otherwise ROS are negative     CONSTITUTIONAL:  Neg   Recent weight changes, fatigue, fever, chills or night sweats  EYES:  Neg  Blurriness, earing, itching or acute change in vision  EARS:  Neg  hearing loss, tinnitus, vertigo, discharge or earache. NOSE:  Neg  Rhinorrhea, sneezing, itching, allergy or epistaxis  MOUTH/THROAT:  Neg  bleeding gums, hoarseness or sore throat. RESPIRATORY:   Neg SOB, wheeze, cough, sputum, hemoptysis or bronchitis  CARDIOVASCULAR:  Neg chest pain, palpitations, dyspnea on exertion, orthopnea, paroxysmal nocturnal dyspnea or edema  GASTROINTESTINAL:  SEE HPI  GENITOURINARY:  Neg  Urinary frequency, hesitancy, urgency, polyuria, dysuria, hematuria, nocturia, or incontinence. HEMATOLOGIC/LYMPHATIC:  Neg  Anemia, bleeding tendency  MUSCULOSKELETAL:    New myalgias, bone pain, joint pain, swelling or stiffness and has had change in gait.   NEUROLOGICAL:  Neg  Loss of Consciousness, memory loss, forgetfulness, periods of confusion, difficulty concentrating, seizures, decline in intellect, nervousness, insomina, aphasia or dysarthria. SKIN:  Neg  skin or hair changes, and has no itching, rashes, or sores. PSYCHIATRIC:  Neg depression, personality changes, anxiety. ENDOCRINE:  Neg polydipsia, polyuria, abnormal weight changes, heat /cold intolerance.   ALL/IMM:  Neg reactions to drugs other than listed    PHYSICAL EXAM:      Vitals:    BP (!) 135/93   Pulse 63   Temp 98.5 °F (36.9 °C) (Oral)   Resp 15   Ht 5' 4\" (1.626 m)   Wt 278 lb 8 oz (126.3 kg)   SpO2 97%   BMI 47.80 kg/m²     General Appearance:    Alert, cooperative, no distress, appears stated age, morbidly obese   HEENT:    Normocephalic, atraumatic, Conjunctiva clear, Lips, mucosa, and tongue normal; teeth and gums normal   Neck:   Supple, symmetrical, trachea midline   Lungs:     Clear to auscultation bilaterally, respirations unlabored   Chest Wall:    No tenderness or deformity    Heart:    Regular rate and rhythm, S1 and S2 normal, no murmur, rub   or gallop   Abdomen:     Soft, RUQ tenderness, bowel sounds active all four quadrants    Rectal:    Deferred   Extremities:   Extremities normal, atraumatic, no cyanosis or edema   Pulses:   2+ and symmetric all extremities   Skin:   Skin color, texture, turgor normal, no rashes or lesions   Lymph nodes:   No abnormality   Neurologic:   No focal deficits, moving all four extremities            DATA:    ABGs: No results found for: PHART, PO2ART, [id number]  CBC:   Recent Labs     08/17/20  1330   WBC 4.0   HGB 10.4*   *     BMP:    Recent Labs     08/17/20  1330      K 4.2      CO2 22   BUN 16   CREATININE 0.8   GLUCOSE 100*     Magnesium:   Lab Results   Component Value Date    MG 2.2 08/17/2020     Hepatic:   Recent Labs     08/17/20  1330   AST 19   ALT 22   BILITOT 0.2   ALKPHOS 97     Recent Labs     08/17/20  1330   LIPASE 16     No results for clinical, biochemical, and radiological information discussed with Dr. Brandy Taylor. He agrees with the assessment and plan. Rashard Sanon, CNP  8/19/2020  6:19 AM     Chronic RUQ pain  Chronic air in Bile ducts  Will treat symptomatically for now  Can try Benrtyl 10 mg TID  OP Fu with IU  May consider C scope to evaluate if needed    Seen as Second opinion    I have seen and examined this patient personally, and independently of the nurse practitioner. The plan was developed mutually at the time of the visit with the patient. Dontrell Boateng and myself have spoken with patient, nursing staff and provided written and verbal instructions .     The above note has been reviewed and I agree with the Assessment,  Diagnosis, and Treatment plan as suggested by Dontrell Boateng CNP      [address] [facility] gastroenterology

## 2020-08-19 NOTE — PROGRESS NOTES
GENERAL SURGERY PROGRESS NOTE    CC/HPI:           Patient feels better and tolerating liquids. .. Vitals:    08/17/20 2300 08/18/20 0845 08/18/20 0942 08/18/20 1742   BP: (!) 101/52 (!) 101/53  (!) 135/93   Pulse: 58 52  63   Resp: 17 12  15   Temp: 97.9 °F (36.6 °C) 98.1 °F (36.7 °C)  98.5 °F (36.9 °C)   TempSrc: Oral Oral  Oral   SpO2: 96% 97% 97% 97%   Weight: 278 lb 8 oz (126.3 kg)      Height: 5' 4\" (1.626 m)        No intake/output data recorded. No intake/output data recorded.     DIET CLEAR LIQUID;    Recent Results (from the past 48 hour(s))   Urinalysis    Collection Time: 08/17/20  1:13 PM   Result Value Ref Range    Color, UA YELLOW YELLOW    Clarity, UA CLEAR CLEAR    Glucose, Urine NEGATIVE NEGATIVE MG/DL    Bilirubin Urine NEGATIVE NEGATIVE MG/DL    Ketones, Urine NEGATIVE NEGATIVE MG/DL    Specific Gravity, UA 1.010 1.001 - 1.035    Blood, Urine NEGATIVE NEGATIVE    pH, Urine 6.0 5.0 - 8.0    Protein, UA NEGATIVE NEGATIVE MG/DL    Urobilinogen, Urine NORMAL 0.2 - 1.0 MG/DL    Nitrite Urine, Quantitative NEGATIVE NEGATIVE    Leukocyte Esterase, Urine NEGATIVE NEGATIVE    RBC, UA NONE SEEN 0 - 6 /HPF    WBC, UA 1 0 - 5 /HPF    Bacteria, UA NEGATIVE NEGATIVE /HPF    Squam Epithel, UA 5 /HPF    Mucus, UA RARE (A) NEGATIVE HPF    Trichomonas, UA NONE SEEN NONE SEEN /HPF   Urine Drug Screen    Collection Time: 08/17/20  1:13 PM   Result Value Ref Range    Cannabinoid Scrn, Ur NEGATIVE NEGATIVE    Amphetamines NEGATIVE NEGATIVE    Cocaine Metabolite NEGATIVE NEGATIVE    Benzodiazepine Screen, Urine NEGATIVE NEGATIVE    Barbiturate Screen, Ur NEGATIVE NEGATIVE    Opiates, Urine NEGATIVE NEGATIVE    Phencyclidine, Urine NEGATIVE NEGATIVE    Oxycodone UNCONFIRMED POSITIVE (A) NEGATIVE   CBC auto diff    Collection Time: 08/17/20  1:30 PM   Result Value Ref Range    WBC 4.0 4.0 - 10.5 K/CU MM    RBC 3.82 (L) 4.2 - 5.4 M/CU MM    Hemoglobin 10.4 (L) 12.5 - 16.0 GM/DL    Hematocrit 32.5 (L) 37 - 47 %    MCV 85.1 78 - 100 FL    MCH 27.2 27 - 31 PG    MCHC 32.0 32.0 - 36.0 %    RDW 13.8 11.7 - 14.9 %    Platelets 970 (L) 424 - 440 K/CU MM    MPV 10.8 7.5 - 11.1 FL    Differential Type AUTOMATED DIFFERENTIAL     Segs Relative 51.8 36 - 66 %    Lymphocytes % 35.0 24 - 44 %    Monocytes % 8.1 (H) 0 - 4 %    Eosinophils % 4.0 (H) 0 - 3 %    Basophils % 0.8 0 - 1 %    Segs Absolute 2.1 K/CU MM    Lymphocytes Absolute 1.4 K/CU MM    Monocytes Absolute 0.3 K/CU MM    Eosinophils Absolute 0.2 K/CU MM    Basophils Absolute 0.0 K/CU MM    Nucleated RBC % 0.0 %    Total Nucleated RBC 0.0 K/CU MM    TRC 0.0596 K/CU MM    Total Immature Neutrophil 0.01 K/CU MM    Immature Neutrophil % 0.3 0 - 0.43 %   CMP    Collection Time: 08/17/20  1:30 PM   Result Value Ref Range    Sodium 138 135 - 145 MMOL/L    Potassium 4.2 3.5 - 5.1 MMOL/L    Chloride 107 99 - 110 mMol/L    CO2 22 21 - 32 MMOL/L    BUN 16 6 - 23 MG/DL    CREATININE 0.8 0.6 - 1.1 MG/DL    Glucose 100 (H) 70 - 99 MG/DL    Calcium 10.0 8.3 - 10.6 MG/DL    Alb 3.9 3.4 - 5.0 GM/DL    Total Protein 6.2 (L) 6.4 - 8.2 GM/DL    Total Bilirubin 0.2 0.0 - 1.0 MG/DL    ALT 22 10 - 40 U/L    AST 19 15 - 37 IU/L    Alkaline Phosphatase 97 40 - 129 IU/L    GFR Non-African American >60 >60 mL/min/1.73m2    GFR African American >60 >60 mL/min/1.73m2    Anion Gap 9 4 - 16   Lipase    Collection Time: 08/17/20  1:30 PM   Result Value Ref Range    Lipase 16 13 - 60 IU/L   Lactic Acid, Plasma    Collection Time: 08/17/20  1:30 PM   Result Value Ref Range    Lactate 0.7 0.4 - 2.0 mMOL/L   Troponin    Collection Time: 08/17/20  1:30 PM   Result Value Ref Range    Troponin T <0.010 <0.01 NG/ML   Magnesium    Collection Time: 08/17/20  1:31 PM   Result Value Ref Range    Magnesium 2.2 1.8 - 2.4 mg/dl   EKG 12 Lead    Collection Time: 08/17/20  1:55 PM   Result Value Ref Range    Ventricular Rate 53 BPM    Atrial Rate 53 BPM    P-R Interval 192 ms    QRS Duration 96 ms    Q-T Interval 420 ms QTc Calculation (Bazett) 394 ms    P Axis 39 degrees    R Axis -18 degrees    T Axis 12 degrees    Diagnosis       Sinus bradycardia  Incomplete right bundle branch block  Borderline ECG  When compared with ECG of 05-AUG-2020 12:39,  No significant change was found  Confirmed by OPAL Montano ([id number]) on 8/17/2020 8:25:00 PM         Scheduled Meds:   ondansetron  4 mg Intravenous Q4H    PARoxetine  50 mg Oral Nightly    gabapentin  800 mg Oral TID    levothyroxine  125 mcg Oral Daily    therapeutic multivitamin-minerals  1 tablet Oral Daily    calcium-vitamin D  1 tablet Oral BID    estradiol  0.5 mg Oral Daily    atenolol  25 mg Oral Daily    vitamin D  5,000 Units Oral Daily    pantoprazole  40 mg Oral QAM AC    levETIRAcetam  1,000 mg Oral BID    sodium chloride flush  10 mL Intravenous BID    famotidine  20 mg Oral BID    nicotine  1 patch Transdermal Daily    enoxaparin  40 mg Subcutaneous Daily    guaiFENesin  1,200 mg Oral BID       Continuous Infusions:   sodium chloride 75 mL/hr at 08/18/20 1322       Physical Exam:  HEENT: Anicteric sclerae, Oropharyngeal mucosae moist, pink and intact. Heart:  Normal S1 and S2, RRR  Lungs: Clear to auscultation bilaterally, No audible Wheezes or Rales. Extremities: No edema. Neuro: Alert and Oriented x 3, Non focal.  Abdomen: Soft, Benign, RUQ tender, Non distended, Positive bowel sounds. Active Problems:    Intractable vomiting with nausea    Morbid obesity with BMI of 40.0-44.9, adult ([facility])    History of Jet-en-Y gastric bypass    Seizure ([facility])    Abdominal pain    Anxiety  Resolved Problems:    * No resolved hospital problems. *      Assessment and Plan:    Estefanía Steen is a very pleasant 44 y.o. female presenting with a h/o sleeve gasrtrectomy, and described nausea and vomiting and abdominal pain post prandially yesterday, with nausea and vomiting and diarrhea, with sweet food. . re-counseled her IN LENGTH, and consulted dietitian to help her with food choices, and Dr Paula Badillo consulted Dr Mono Robledo yesterday to help her RUQ pain. Andrew Anders and I'll follow. . Her case was discussed in length with both dr Paula Badillo, and dr Mono Robledo yesterday.     ___________________________________________    Osmany Soto MD, FACS, FICS  8/19/2020  6:19 AM

## 2020-08-20 LAB
HCT VFR BLD CALC: 33.4 % (ref 37–47)
HEMOGLOBIN: 10.4 GM/DL (ref 12.5–16)
MCH RBC QN AUTO: 26.9 PG (ref 27–31)
MCHC RBC AUTO-ENTMCNC: 31.1 % (ref 32–36)
MCV RBC AUTO: 86.5 FL (ref 78–100)
PDW BLD-RTO: 14.1 % (ref 11.7–14.9)
PLATELET # BLD: 196 K/CU MM (ref 140–440)
PMV BLD AUTO: 9.9 FL (ref 7.5–11.1)
RBC # BLD: 3.86 M/CU MM (ref 4.2–5.4)
WBC # BLD: 4.7 K/CU MM (ref 4–10.5)

## 2020-08-20 PROCEDURE — 2580000003 HC RX 258: Performed by: FAMILY MEDICINE

## 2020-08-20 PROCEDURE — 85027 COMPLETE CBC AUTOMATED: CPT

## 2020-08-20 PROCEDURE — 6360000002 HC RX W HCPCS: Performed by: FAMILY MEDICINE

## 2020-08-20 PROCEDURE — 6360000002 HC RX W HCPCS: Performed by: SURGERY

## 2020-08-20 PROCEDURE — 99232 SBSQ HOSP IP/OBS MODERATE 35: CPT | Performed by: SURGERY

## 2020-08-20 PROCEDURE — 94761 N-INVAS EAR/PLS OXIMETRY MLT: CPT

## 2020-08-20 PROCEDURE — 6370000000 HC RX 637 (ALT 250 FOR IP): Performed by: NURSE PRACTITIONER

## 2020-08-20 PROCEDURE — 6370000000 HC RX 637 (ALT 250 FOR IP): Performed by: FAMILY MEDICINE

## 2020-08-20 PROCEDURE — 6360000002 HC RX W HCPCS: Performed by: NURSE PRACTITIONER

## 2020-08-20 PROCEDURE — 36415 COLL VENOUS BLD VENIPUNCTURE: CPT

## 2020-08-20 PROCEDURE — 1200000000 HC SEMI PRIVATE

## 2020-08-20 RX ORDER — PANTOPRAZOLE SODIUM 40 MG/1
40 TABLET, DELAYED RELEASE ORAL
Status: DISCONTINUED | OUTPATIENT
Start: 2020-08-20 | End: 2020-08-22 | Stop reason: HOSPADM

## 2020-08-20 RX ORDER — SCOLOPAMINE TRANSDERMAL SYSTEM 1 MG/1
1 PATCH, EXTENDED RELEASE TRANSDERMAL
Status: DISCONTINUED | OUTPATIENT
Start: 2020-08-20 | End: 2020-08-22 | Stop reason: HOSPADM

## 2020-08-20 RX ORDER — PROMETHAZINE HYDROCHLORIDE 25 MG/ML
25 INJECTION, SOLUTION INTRAMUSCULAR; INTRAVENOUS EVERY 6 HOURS PRN
Status: DISCONTINUED | OUTPATIENT
Start: 2020-08-20 | End: 2020-08-21

## 2020-08-20 RX ORDER — MORPHINE SULFATE 4 MG/ML
4 INJECTION, SOLUTION INTRAMUSCULAR; INTRAVENOUS
Status: DISCONTINUED | OUTPATIENT
Start: 2020-08-20 | End: 2020-08-21

## 2020-08-20 RX ORDER — ALBUTEROL SULFATE 90 UG/1
AEROSOL, METERED RESPIRATORY (INHALATION)
COMMUNITY
Start: 2020-08-01 | End: 2021-02-08 | Stop reason: SDUPTHER

## 2020-08-20 RX ORDER — SUCRALFATE 1 G/1
1 TABLET ORAL EVERY 12 HOURS SCHEDULED
Status: DISCONTINUED | OUTPATIENT
Start: 2020-08-20 | End: 2020-08-22 | Stop reason: HOSPADM

## 2020-08-20 RX ADMIN — SODIUM CHLORIDE: 9 INJECTION, SOLUTION INTRAVENOUS at 00:49

## 2020-08-20 RX ADMIN — ONDANSETRON 4 MG: 2 INJECTION INTRAMUSCULAR; INTRAVENOUS at 21:33

## 2020-08-20 RX ADMIN — ONDANSETRON 4 MG: 2 INJECTION INTRAMUSCULAR; INTRAVENOUS at 06:31

## 2020-08-20 RX ADMIN — MORPHINE SULFATE 4 MG: 4 INJECTION, SOLUTION INTRAMUSCULAR; INTRAVENOUS at 08:56

## 2020-08-20 RX ADMIN — ONDANSETRON 4 MG: 2 INJECTION INTRAMUSCULAR; INTRAVENOUS at 15:12

## 2020-08-20 RX ADMIN — SUCRALFATE 1 G: 1 TABLET ORAL at 11:42

## 2020-08-20 RX ADMIN — MORPHINE SULFATE 2 MG: 2 INJECTION, SOLUTION INTRAMUSCULAR; INTRAVENOUS at 06:31

## 2020-08-20 RX ADMIN — MORPHINE SULFATE 4 MG: 4 INJECTION, SOLUTION INTRAMUSCULAR; INTRAVENOUS at 11:32

## 2020-08-20 RX ADMIN — SODIUM CHLORIDE, PRESERVATIVE FREE 10 ML: 5 INJECTION INTRAVENOUS at 11:47

## 2020-08-20 RX ADMIN — OXYCODONE AND ACETAMINOPHEN 1 TABLET: 5; 325 TABLET ORAL at 04:09

## 2020-08-20 RX ADMIN — PANTOPRAZOLE SODIUM 40 MG: 40 TABLET, DELAYED RELEASE ORAL at 16:31

## 2020-08-20 RX ADMIN — MORPHINE SULFATE 4 MG: 4 INJECTION, SOLUTION INTRAMUSCULAR; INTRAVENOUS at 15:12

## 2020-08-20 RX ADMIN — ONDANSETRON 4 MG: 2 INJECTION INTRAMUSCULAR; INTRAVENOUS at 08:55

## 2020-08-20 RX ADMIN — ONDANSETRON 4 MG: 2 INJECTION INTRAMUSCULAR; INTRAVENOUS at 02:42

## 2020-08-20 RX ADMIN — LEVETIRACETAM 1000 MG: 500 TABLET ORAL at 11:43

## 2020-08-20 RX ADMIN — MORPHINE SULFATE 4 MG: 4 INJECTION, SOLUTION INTRAMUSCULAR; INTRAVENOUS at 19:44

## 2020-08-20 RX ADMIN — LORAZEPAM 1 MG: 1 TABLET ORAL at 06:44

## 2020-08-20 RX ADMIN — ENOXAPARIN SODIUM 40 MG: 40 INJECTION SUBCUTANEOUS at 11:43

## 2020-08-20 RX ADMIN — ZOLPIDEM TARTRATE 5 MG: 5 TABLET ORAL at 21:33

## 2020-08-20 RX ADMIN — PROMETHAZINE HYDROCHLORIDE 25 MG: 25 INJECTION INTRAMUSCULAR; INTRAVENOUS at 11:33

## 2020-08-20 RX ADMIN — LEVETIRACETAM 1000 MG: 500 TABLET ORAL at 21:02

## 2020-08-20 RX ADMIN — MORPHINE SULFATE 4 MG: 4 INJECTION, SOLUTION INTRAMUSCULAR; INTRAVENOUS at 22:16

## 2020-08-20 RX ADMIN — PROMETHAZINE HYDROCHLORIDE 12.5 MG: 25 INJECTION INTRAMUSCULAR; INTRAVENOUS at 04:09

## 2020-08-20 RX ADMIN — MORPHINE SULFATE 2 MG: 2 INJECTION, SOLUTION INTRAMUSCULAR; INTRAVENOUS at 02:42

## 2020-08-20 RX ADMIN — LEVOTHYROXINE SODIUM 125 MCG: 125 TABLET ORAL at 04:08

## 2020-08-20 RX ADMIN — ATENOLOL 25 MG: 25 TABLET ORAL at 11:42

## 2020-08-20 RX ADMIN — TIZANIDINE 4 MG: 4 TABLET ORAL at 15:26

## 2020-08-20 RX ADMIN — PROMETHAZINE HYDROCHLORIDE 25 MG: 25 INJECTION INTRAMUSCULAR; INTRAVENOUS at 19:44

## 2020-08-20 RX ADMIN — ESTRADIOL 0.5 MG: 1 TABLET ORAL at 15:27

## 2020-08-20 RX ADMIN — GUAIFENESIN 1200 MG: 600 TABLET, EXTENDED RELEASE ORAL at 21:05

## 2020-08-20 RX ADMIN — GABAPENTIN 800 MG: 400 CAPSULE ORAL at 15:28

## 2020-08-20 RX ADMIN — SODIUM CHLORIDE, PRESERVATIVE FREE 10 ML: 5 INJECTION INTRAVENOUS at 22:16

## 2020-08-20 RX ADMIN — PANTOPRAZOLE SODIUM 40 MG: 40 TABLET, DELAYED RELEASE ORAL at 04:08

## 2020-08-20 RX ADMIN — PAROXETINE HYDROCHLORIDE 50 MG: 20 TABLET, FILM COATED ORAL at 21:04

## 2020-08-20 RX ADMIN — GABAPENTIN 800 MG: 400 CAPSULE ORAL at 21:04

## 2020-08-20 RX ADMIN — GABAPENTIN 800 MG: 400 CAPSULE ORAL at 11:43

## 2020-08-20 RX ADMIN — SODIUM CHLORIDE: 9 INJECTION, SOLUTION INTRAVENOUS at 15:25

## 2020-08-20 ASSESSMENT — PAIN SCALES - GENERAL
PAINLEVEL_OUTOF10: 7
PAINLEVEL_OUTOF10: 0
PAINLEVEL_OUTOF10: 9
PAINLEVEL_OUTOF10: 7
PAINLEVEL_OUTOF10: 6
PAINLEVEL_OUTOF10: 7
PAINLEVEL_OUTOF10: 0
PAINLEVEL_OUTOF10: 7
PAINLEVEL_OUTOF10: 8
PAINLEVEL_OUTOF10: 7
PAINLEVEL_OUTOF10: 8
PAINLEVEL_OUTOF10: 0
PAINLEVEL_OUTOF10: 7
PAINLEVEL_OUTOF10: 8
PAINLEVEL_OUTOF10: 7

## 2020-08-20 ASSESSMENT — PAIN DESCRIPTION - LOCATION
LOCATION: ABDOMEN

## 2020-08-20 NOTE — PROGRESS NOTES
GENERAL SURGERY PROGRESS NOTE    CC/HPI:           Patient does NOT feel better and started to vomit again. .... Vitals:    08/19/20 2145 08/19/20 2201 08/20/20 0245 08/20/20 0400   BP: (!) 145/90  112/63 112/72   Pulse:       Resp:   19    Temp: 98.6 °F (37 °C) 98.8 °F (37.1 °C) 97.8 °F (36.6 °C) 97.8 °F (36.6 °C)   TempSrc: Oral Oral Oral Axillary   SpO2: 95%  99% 99%   Weight:       Height:         I/O last 3 completed shifts: In: 61 [P.O.:60]  Out: -   No intake/output data recorded.     DIET CLEAR LIQUID;    Recent Results (from the past 48 hour(s))   Basic Metabolic Panel w/ Reflex to MG    Collection Time: 08/19/20  5:05 PM   Result Value Ref Range    Sodium 141 135 - 145 MMOL/L    Potassium 4.1 3.5 - 5.1 MMOL/L    Chloride 111 (H) 99 - 110 mMol/L    CO2 21 21 - 32 MMOL/L    Anion Gap 9 4 - 16    BUN 14 6 - 23 MG/DL    CREATININE 0.8 0.6 - 1.1 MG/DL    Glucose 128 (H) 70 - 99 MG/DL    Calcium 9.4 8.3 - 10.6 MG/DL    GFR Non-African American >60 >60 mL/min/1.73m2    GFR African American >60 >60 mL/min/1.73m2   CBC auto differential    Collection Time: 08/19/20  5:05 PM   Result Value Ref Range    WBC 5.3 4.0 - 10.5 K/CU MM    RBC 3.82 (L) 4.2 - 5.4 M/CU MM    Hemoglobin 10.5 (L) 12.5 - 16.0 GM/DL    Hematocrit 34.3 (L) 37 - 47 %    MCV 89.8 78 - 100 FL    MCH 27.5 27 - 31 PG    MCHC 30.6 (L) 32.0 - 36.0 %    RDW 14.0 11.7 - 14.9 %    Platelets 768 519 - 013 K/CU MM    MPV 9.9 7.5 - 11.1 FL    Differential Type AUTOMATED DIFFERENTIAL     Segs Relative 62.9 36 - 66 %    Lymphocytes % 25.3 24 - 44 %    Monocytes % 6.9 (H) 0 - 4 %    Eosinophils % 3.9 (H) 0 - 3 %    Basophils % 0.8 0 - 1 %    Segs Absolute 3.4 K/CU MM    Lymphocytes Absolute 1.4 K/CU MM    Monocytes Absolute 0.4 K/CU MM    Eosinophils Absolute 0.2 K/CU MM    Basophils Absolute 0.0 K/CU MM    Nucleated RBC % 0.0 %    Total Nucleated RBC 0.0 K/CU MM    Total Immature Neutrophil 0.01 K/CU MM    Immature Neutrophil % 0.2 0 - 0.43 %   CBC Collection Time: 08/20/20  6:40 AM   Result Value Ref Range    WBC 4.7 4.0 - 10.5 K/CU MM    RBC 3.86 (L) 4.2 - 5.4 M/CU MM    Hemoglobin 10.4 (L) 12.5 - 16.0 GM/DL    Hematocrit 33.4 (L) 37 - 47 %    MCV 86.5 78 - 100 FL    MCH 26.9 (L) 27 - 31 PG    MCHC 31.1 (L) 32.0 - 36.0 %    RDW 14.1 11.7 - 14.9 %    Platelets 706 989 - 354 K/CU MM    MPV 9.9 7.5 - 11.1 FL       Scheduled Meds:   ondansetron  4 mg Intravenous Q4H    PARoxetine  50 mg Oral Nightly    gabapentin  800 mg Oral TID    levothyroxine  125 mcg Oral Daily    therapeutic multivitamin-minerals  1 tablet Oral Daily    calcium-vitamin D  1 tablet Oral BID    estradiol  0.5 mg Oral Daily    atenolol  25 mg Oral Daily    vitamin D  5,000 Units Oral Daily    pantoprazole  40 mg Oral QAM AC    levETIRAcetam  1,000 mg Oral BID    sodium chloride flush  10 mL Intravenous BID    famotidine  20 mg Oral BID    nicotine  1 patch Transdermal Daily    enoxaparin  40 mg Subcutaneous Daily    guaiFENesin  1,200 mg Oral BID       Continuous Infusions:   sodium chloride 75 mL/hr at 08/20/20 0049       Physical Exam:  HEENT: Anicteric sclerae, Oropharyngeal mucosae moist, pink and intact. Heart:  Normal S1 and S2, RRR  Lungs: Clear to auscultation bilaterally, No audible Wheezes or Rales. Extremities: No edema. Neuro: Alert and Oriented x 3, Non focal.  Abdomen: Soft, Benign, RUQ tender, Non distended, Positive bowel sounds. Active Problems:    Intractable vomiting with nausea    Morbid obesity with BMI of 40.0-44.9, adult (HCC)    History of Jet-en-Y gastric bypass    Seizure (HCC)    Abdominal pain    Anxiety  Resolved Problems:    * No resolved hospital problems. *      Assessment and Plan:    Meseret Dougherty is a very pleasant 44 y.o. female presenting with a h/o sleeve gasrtrectomy, and described nausea and vomiting and abdominal pain post prandially, with sweet food. . re-counseled her IN LENGTH, and consulted dietitian to help her with food choices, and Dr Edgar Butler consulted Dr Alejandra Sheets yesterday to help her RUQ pain. Sari Ramirez and I'll follow. . Her case was discussed in length with both dr Edgar Butler, and dr Alejandra Sheets the day before yesterday. She had ERCP performed before her bariatric surgeries, and stents placed in her CBD before. .    Dr Alejandra Sheets:  Chronic RUQ pain  Chronic air in Bile ducts  Will treat symptomatically for now  Can try Benrtyl 10 mg TID  OP Fu with [facility]  May consider C scope to evaluate if needed    N/Vomiting, asked for phenergan IV, Rx-ed. Vernestine Graves  ___________________________________________    Sita Liu MD, FACS, FICS  8/20/2020  9:04 AM

## 2020-08-20 NOTE — PROGRESS NOTES
[facility]        Progress Note       2020  2:50 PM    Patient:    Ino Zapien  : 1970   44 y.o. MRN: [id number]  Admitted: 2020 12:23 PM ATT: Teressa Gonzales MD   [id number]  AdmitDx: Chronic abdominal pain [R10.9, G89.29]  Intractable vomiting with nausea [R11.2]  PCP: Teressa Gonzales MD    SUBJECTIVE:  Chart reviewed, events noted  Patient feeling ill. Reports nausea with hematemisis Last BM this am. RUQ pain. Sitting up in bed, mother at bedside. Concerned about transportation to .      ROS:  The positive ROS will be identified in bold     CONSTITUTIONAL:  Neg  Weight loss, fatigue, fever  MOUTH/THROAT:  Neg  Bleeding gums, hoarseness or sore throat  RESPIRATORY:   Neg SOB, wheeze, cough, hemoptysis or bronchitis  CARDIOVASCULAR:  Neg Chest pain, palpitations, dyspnea on exertion, edema  GASTROINTESTINAL:  SEE HPI  HEMATOLOGIC/LYMPHATIC:  Neg  Anemia, bleeding tendency  MUSCULOSKELETAL: Neg  New myalgias, joint pain, swelling or stiffness  NEUROLOGICAL:  Neg  Loss of Consciousness, memory loss, forgetfulness, periods of confusion, difficulty concentrating, seizures, insomnia, aphasia    SKIN:  Neg No itching, rashes, or sores  PSYCHIATRIC:  Neg Depression, personality changes, anxiety    OBJECTIVE:      /63   Pulse 66   Temp 98.9 °F (37.2 °C) (Oral)   Resp 19   Ht 5' 4\" (1.626 m)   Wt 278 lb 8 oz (126.3 kg)   SpO2 97%   BMI 47.80 kg/m²     NAD, appears comfortable, sitting up in bed, mother at bedside   Lips and mucous membranes pink and moist  RRR, Nl s1s2, no murmurs, no JVD  Lungs CTA bilaterally, respirations even and unlabored   Abdomen soft, ND, NT, no HSM, bowel sounds normal  Skin pink, warm, dry and CR brisk   No edema bilateral lower extremities   AAOx3     CBC:   Recent Labs     20  1705 20  0640   WBC 5.3 4.7   HGB 10.5* 10.4*    196     BMP:    Recent Labs     20  1705      K 4.1   * CO2 21   BUN 14   CREATININE 0.8   GLUCOSE 128*     Magnesium:   Lab Results   Component Value Date    MG 2.2 08/17/2020     Hepatic: No results for input(s): AST, ALT, ALB, BILITOT, ALKPHOS in the last 72 hours. INR: No results for input(s): INR in the last 72 hours.     No intake or output data in the 24 hours ending 08/20/20 1450      Medications    Scheduled Medications:    scopolamine  1 patch Transdermal Q72H    pantoprazole  40 mg Oral BID AC    sucralfate  1 g Oral 2 times per day    ondansetron  4 mg Intravenous Q4H    PARoxetine  50 mg Oral Nightly    gabapentin  800 mg Oral TID    levothyroxine  125 mcg Oral Daily    therapeutic multivitamin-minerals  1 tablet Oral Daily    calcium-vitamin D  1 tablet Oral BID    estradiol  0.5 mg Oral Daily    atenolol  25 mg Oral Daily    vitamin D  5,000 Units Oral Daily    levETIRAcetam  1,000 mg Oral BID    sodium chloride flush  10 mL Intravenous BID    nicotine  1 patch Transdermal Daily    enoxaparin  40 mg Subcutaneous Daily    guaiFENesin  1,200 mg Oral BID     PRN Medications: morphine, promethazine, dicyclomine, LORazepam, sodium chloride flush, potassium chloride **OR** potassium alternative oral replacement **OR** potassium chloride, magnesium sulfate, acetaminophen **OR** acetaminophen, polyethylene glycol, fleet, [DISCONTINUED] promethazine **OR** ondansetron, zolpidem, benzonatate, calcium carbonate, tiZANidine  Infusions:    sodium chloride 75 mL/hr at 08/20/20 0049         Patient Active Problem List   Diagnosis Code    Fibromyalgia M79.7    Lupus (systemic lupus erythematosus) (HCC) M32.9    Chronic pancreatitis (HCC) K86.1    HTN (hypertension) I10    Generalized abdominal pain R10.84    Frequent UTI N39.0    Gastroesophageal reflux disease without esophagitis K21.9    Depression F32.9    Fatty liver disease, nonalcoholic I65.9    Arthritis M19.90    Bilateral low back pain with left-sided sciatica M54.42    Intractable

## 2020-08-20 NOTE — PROGRESS NOTES
Attending Progress Note      PCP: Dimitris Carter MD    Patient: Amy Cabrales   Gender: female  : 1970   Age: 44 y.o. MRN: [id number]      Date of Admission: 2020    Chief Complaint:   Chief Complaint   Patient presents with    Abdominal Pain     started a week ago and has progressed; sent by pcp    Emesis    Nausea           Subjective: still has abd pain , nausea and vomiting . . poor oral intake         Medications:  Reviewed  Infusion Medications    sodium chloride 75 mL/hr at 20 1525     Scheduled Medications    scopolamine  1 patch Transdermal Q72H    pantoprazole  40 mg Oral BID AC    sucralfate  1 g Oral 2 times per day    ondansetron  4 mg Intravenous Q4H    PARoxetine  50 mg Oral Nightly    gabapentin  800 mg Oral TID    levothyroxine  125 mcg Oral Daily    therapeutic multivitamin-minerals  1 tablet Oral Daily    calcium-vitamin D  1 tablet Oral BID    estradiol  0.5 mg Oral Daily    atenolol  25 mg Oral Daily    vitamin D  5,000 Units Oral Daily    levETIRAcetam  1,000 mg Oral BID    sodium chloride flush  10 mL Intravenous BID    nicotine  1 patch Transdermal Daily    enoxaparin  40 mg Subcutaneous Daily    guaiFENesin  1,200 mg Oral BID     PRN Meds: morphine, promethazine, dicyclomine, LORazepam, sodium chloride flush, potassium chloride **OR** potassium alternative oral replacement **OR** potassium chloride, magnesium sulfate, acetaminophen **OR** acetaminophen, polyethylene glycol, fleet, [DISCONTINUED] promethazine **OR** ondansetron, zolpidem, benzonatate, calcium carbonate, tiZANidine    No intake or output data in the 24 hours ending 20 1635    Exam:  /63   Pulse 66   Temp 98.9 °F (37.2 °C) (Oral)   Resp 16   Ht 5' 4\" (1.626 m)   Wt 278 lb 8 oz (126.3 kg)   SpO2 97%   BMI 47.80 kg/m²   General appearance: No distress,   Respiratory:  good air entry , no Rales , No wheezing, or rhonchi,  Cardiovascular: RRR, with normal S1/S2 . Abdomen : Soft, tender, non-distended  , normal bowel sounds. Legs : No edema bilaterally. No DVT signs ,    Neurologic:  Alert and oriented ,        Labs:   Recent Labs     08/19/20  1705 08/20/20  0640   WBC 5.3 4.7   HGB 10.5* 10.4*   HCT 34.3* 33.4*    196     Recent Labs     08/19/20  1705      K 4.1   *   CO2 21   BUN 14   CREATININE 0.8   CALCIUM 9.4     No results for input(s): AST, ALT, BILIDIR, BILITOT, ALKPHOS in the last 72 hours. No results for input(s): INR in the last 72 hours. No results for input(s): Debbie Ke in the last 72 hours. Assessment/Plan:    Active Hospital Problems    Diagnosis Date Noted    Intractable vomiting with nausea [R11.2] 07/21/2018     Priority: High    Abdominal pain [R10.9] 07/21/2020     Priority: Medium    Anxiety [F41.9] 08/17/2020    Seizure ([facility] [address] [R56.9] 07/17/2020    History of Jet-en-Y gastric bypass [Z98.84]     Morbid obesity with BMI of 40.0-44.9, adult ([facility] [address] [E66.01, Z68.41] 03/27/2020         Plan: Will advance diet . . plan for d/c tomorrow if symptoms controlled better and able to keep food with resonable oral intake . .. GI and surg input noted     Tele   IVF   Cont Keppra ,   GI input  Noted with plan for referral to [facility]   Advance clear liquid diet as tolerated     Home meds , reviewed and resumed as appropriate   Symptoms releif/Pain control  DVT proph      DVT Prophylaxis   Diet: DIET CLEAR LIQUID;  Code Status: Full Code    Treatment progress and plan was d/w pt/family .         Jessica Abreu MD

## 2020-08-21 ENCOUNTER — APPOINTMENT (OUTPATIENT)
Dept: GENERAL RADIOLOGY | Age: 50
DRG: 092 | End: 2020-08-21
Payer: COMMERCIAL

## 2020-08-21 ENCOUNTER — APPOINTMENT (OUTPATIENT)
Dept: CT IMAGING | Age: 50
DRG: 092 | End: 2020-08-21
Payer: COMMERCIAL

## 2020-08-21 VITALS
DIASTOLIC BLOOD PRESSURE: 63 MMHG | RESPIRATION RATE: 15 BRPM | WEIGHT: 253.9 LBS | BODY MASS INDEX: 43.35 KG/M2 | TEMPERATURE: 98.2 F | SYSTOLIC BLOOD PRESSURE: 135 MMHG | OXYGEN SATURATION: 96 % | HEART RATE: 90 BPM | HEIGHT: 64 IN

## 2020-08-21 LAB
GLUCOSE BLD-MCNC: 96 MG/DL (ref 70–99)
HCT VFR BLD CALC: 38.5 % (ref 37–47)
HEMOGLOBIN: 10.8 GM/DL (ref 12.5–16)
MCH RBC QN AUTO: 26 PG (ref 27–31)
MCHC RBC AUTO-ENTMCNC: 28.1 % (ref 32–36)
MCV RBC AUTO: 92.5 FL (ref 78–100)
PDW BLD-RTO: 13.9 % (ref 11.7–14.9)
PLATELET # BLD: 212 K/CU MM (ref 140–440)
PMV BLD AUTO: 9.8 FL (ref 7.5–11.1)
RBC # BLD: 4.16 M/CU MM (ref 4.2–5.4)
REASON FOR REJECTION: NORMAL
REJECTED TEST: NORMAL
WBC # BLD: 6.5 K/CU MM (ref 4–10.5)

## 2020-08-21 PROCEDURE — 36415 COLL VENOUS BLD VENIPUNCTURE: CPT

## 2020-08-21 PROCEDURE — 1200000000 HC SEMI PRIVATE

## 2020-08-21 PROCEDURE — 73610 X-RAY EXAM OF ANKLE: CPT

## 2020-08-21 PROCEDURE — 6360000002 HC RX W HCPCS: Performed by: INTERNAL MEDICINE

## 2020-08-21 PROCEDURE — 70450 CT HEAD/BRAIN W/O DYE: CPT

## 2020-08-21 PROCEDURE — 6360000002 HC RX W HCPCS: Performed by: SURGERY

## 2020-08-21 PROCEDURE — 99232 SBSQ HOSP IP/OBS MODERATE 35: CPT | Performed by: SURGERY

## 2020-08-21 PROCEDURE — 85027 COMPLETE CBC AUTOMATED: CPT

## 2020-08-21 PROCEDURE — 6370000000 HC RX 637 (ALT 250 FOR IP): Performed by: NURSE PRACTITIONER

## 2020-08-21 PROCEDURE — 82962 GLUCOSE BLOOD TEST: CPT

## 2020-08-21 PROCEDURE — 94761 N-INVAS EAR/PLS OXIMETRY MLT: CPT

## 2020-08-21 PROCEDURE — 6360000002 HC RX W HCPCS: Performed by: FAMILY MEDICINE

## 2020-08-21 PROCEDURE — 2580000003 HC RX 258: Performed by: FAMILY MEDICINE

## 2020-08-21 PROCEDURE — 6370000000 HC RX 637 (ALT 250 FOR IP): Performed by: FAMILY MEDICINE

## 2020-08-21 RX ORDER — MECLIZINE HYDROCHLORIDE 25 MG/1
25 TABLET ORAL 3 TIMES DAILY PRN
Status: DISCONTINUED | OUTPATIENT
Start: 2020-08-21 | End: 2020-08-22 | Stop reason: HOSPADM

## 2020-08-21 RX ORDER — MECLIZINE HYDROCHLORIDE 25 MG/1
25 TABLET ORAL 3 TIMES DAILY PRN
Qty: 30 TABLET | Refills: 1 | Status: SHIPPED | OUTPATIENT
Start: 2020-08-21 | End: 2020-08-31

## 2020-08-21 RX ORDER — HEPARIN SODIUM (PORCINE) LOCK FLUSH IV SOLN 100 UNIT/ML 100 UNIT/ML
100 SOLUTION INTRAVENOUS PRN
Status: DISCONTINUED | OUTPATIENT
Start: 2020-08-21 | End: 2020-08-22 | Stop reason: HOSPADM

## 2020-08-21 RX ORDER — SUCRALFATE 1 G/1
1 TABLET ORAL EVERY 12 HOURS SCHEDULED
Qty: 120 TABLET | Refills: 3 | Status: SHIPPED | OUTPATIENT
Start: 2020-08-21 | End: 2021-01-26 | Stop reason: CLARIF

## 2020-08-21 RX ADMIN — TIZANIDINE 4 MG: 4 TABLET ORAL at 09:46

## 2020-08-21 RX ADMIN — MORPHINE SULFATE 4 MG: 4 INJECTION, SOLUTION INTRAMUSCULAR; INTRAVENOUS at 01:02

## 2020-08-21 RX ADMIN — Medication 100 UNITS: at 23:10

## 2020-08-21 RX ADMIN — ONDANSETRON 4 MG: 2 INJECTION INTRAMUSCULAR; INTRAVENOUS at 06:57

## 2020-08-21 RX ADMIN — ONDANSETRON 4 MG: 2 INJECTION INTRAMUSCULAR; INTRAVENOUS at 17:43

## 2020-08-21 RX ADMIN — ATENOLOL 25 MG: 25 TABLET ORAL at 09:46

## 2020-08-21 RX ADMIN — PANTOPRAZOLE SODIUM 40 MG: 40 TABLET, DELAYED RELEASE ORAL at 16:17

## 2020-08-21 RX ADMIN — ONDANSETRON 4 MG: 2 INJECTION INTRAMUSCULAR; INTRAVENOUS at 01:02

## 2020-08-21 RX ADMIN — ONDANSETRON 4 MG: 2 INJECTION INTRAMUSCULAR; INTRAVENOUS at 12:49

## 2020-08-21 RX ADMIN — MORPHINE SULFATE 4 MG: 4 INJECTION, SOLUTION INTRAMUSCULAR; INTRAVENOUS at 06:57

## 2020-08-21 RX ADMIN — GABAPENTIN 800 MG: 400 CAPSULE ORAL at 12:50

## 2020-08-21 RX ADMIN — SUCRALFATE 1 G: 1 TABLET ORAL at 06:58

## 2020-08-21 RX ADMIN — ESTRADIOL 0.5 MG: 1 TABLET ORAL at 09:43

## 2020-08-21 RX ADMIN — SODIUM CHLORIDE, PRESERVATIVE FREE 10 ML: 5 INJECTION INTRAVENOUS at 17:44

## 2020-08-21 RX ADMIN — LEVETIRACETAM 1000 MG: 500 TABLET ORAL at 09:46

## 2020-08-21 RX ADMIN — ONDANSETRON 4 MG: 2 INJECTION INTRAMUSCULAR; INTRAVENOUS at 09:46

## 2020-08-21 RX ADMIN — SODIUM CHLORIDE, PRESERVATIVE FREE 10 ML: 5 INJECTION INTRAVENOUS at 16:18

## 2020-08-21 RX ADMIN — GABAPENTIN 800 MG: 400 CAPSULE ORAL at 09:45

## 2020-08-21 RX ADMIN — PANTOPRAZOLE SODIUM 40 MG: 40 TABLET, DELAYED RELEASE ORAL at 06:58

## 2020-08-21 RX ADMIN — PROMETHAZINE HYDROCHLORIDE 25 MG: 25 INJECTION INTRAMUSCULAR; INTRAVENOUS at 04:57

## 2020-08-21 RX ADMIN — LORAZEPAM 1 MG: 1 TABLET ORAL at 09:46

## 2020-08-21 RX ADMIN — GUAIFENESIN 1200 MG: 600 TABLET, EXTENDED RELEASE ORAL at 09:46

## 2020-08-21 RX ADMIN — LEVOTHYROXINE SODIUM 125 MCG: 125 TABLET ORAL at 09:43

## 2020-08-21 RX ADMIN — ENOXAPARIN SODIUM 40 MG: 40 INJECTION SUBCUTANEOUS at 09:46

## 2020-08-21 RX ADMIN — MORPHINE SULFATE 4 MG: 4 INJECTION, SOLUTION INTRAMUSCULAR; INTRAVENOUS at 12:50

## 2020-08-21 RX ADMIN — MORPHINE SULFATE 4 MG: 4 INJECTION, SOLUTION INTRAMUSCULAR; INTRAVENOUS at 04:57

## 2020-08-21 RX ADMIN — MORPHINE SULFATE 4 MG: 4 INJECTION, SOLUTION INTRAMUSCULAR; INTRAVENOUS at 09:55

## 2020-08-21 RX ADMIN — SUCRALFATE 1 G: 1 TABLET ORAL at 16:17

## 2020-08-21 RX ADMIN — MORPHINE SULFATE 4 MG: 4 INJECTION, SOLUTION INTRAMUSCULAR; INTRAVENOUS at 16:17

## 2020-08-21 RX ADMIN — SODIUM CHLORIDE, PRESERVATIVE FREE 10 ML: 5 INJECTION INTRAVENOUS at 23:10

## 2020-08-21 ASSESSMENT — PAIN SCALES - GENERAL
PAINLEVEL_OUTOF10: 7
PAINLEVEL_OUTOF10: 10
PAINLEVEL_OUTOF10: 7
PAINLEVEL_OUTOF10: 7
PAINLEVEL_OUTOF10: 8
PAINLEVEL_OUTOF10: 8
PAINLEVEL_OUTOF10: 7

## 2020-08-21 ASSESSMENT — PAIN DESCRIPTION - LOCATION: LOCATION: ABDOMEN

## 2020-08-21 NOTE — PROGRESS NOTES
[facility]        Progress Note       2020  8:32 AM    Patient:    Yu Guerin  : 1970   44 y.o. MRN: [id number]  Admitted: 2020 12:23 PM ATT: Marek Rodriguez MD   [id number]  AdmitDx: Chronic abdominal pain [R10.9, G89.29]  Intractable vomiting with nausea [R11.2]  PCP: Marek Rodriguez MD    SUBJECTIVE:  Chart reviewed, events noted  Patient feeling ill. Susana Gordon yesterday. Tolerating PO diet. Nausea, last emesis yesterday- per pt. Reports blood tinged emesis yesterday. RUQ abdominal pain.     ROS:  The positive ROS will be identified in bold     CONSTITUTIONAL:  Neg  Weight loss, fatigue, fever  MOUTH/THROAT:  Neg  Bleeding gums, hoarseness or sore throat  RESPIRATORY:   Neg SOB, wheeze, cough, hemoptysis or bronchitis  CARDIOVASCULAR:  Neg Chest pain, palpitations, dyspnea on exertion, edema  GASTROINTESTINAL:  SEE HPI  HEMATOLOGIC/LYMPHATIC:  Neg  Anemia, bleeding tendency  MUSCULOSKELETAL: Neg  New myalgias, joint pain, swelling or stiffness  NEUROLOGICAL:  Neg  Loss of Consciousness, memory loss, forgetfulness, periods of confusion, difficulty concentrating, seizures, insomnia, aphasia    SKIN:  Neg No itching, rashes, or sores  PSYCHIATRIC:  Neg Depression, personality changes, anxiety    OBJECTIVE:      BP (!) 112/55   Pulse 53   Temp 97.7 °F (36.5 °C) (Oral)   Resp 18   Ht 5' 4\" (1.626 m)   Wt 278 lb (126.1 kg)   SpO2 100%   BMI 47.72 kg/m²     NAD, appears comfortable, sitting up in bed  Lips and mucous membranes pink and moist  RRR, Nl s1s2, no murmurs, no JVD  Lungs CTA bilaterally, respirations even and unlabored   Abdomen soft, obese, RUQ tenderness, no HSM, bowel sounds normal  Skin pink, warm, dry and CR brisk   No edema bilateral lower extremities   AAOx3      CBC:   Recent Labs     20  1705 20  0640   WBC 5.3 4.7   HGB 10.5* 10.4*    196     BMP:    Recent Labs     20  1705      K 4.1   * CO2 21   BUN 14   CREATININE 0.8   GLUCOSE 128*     Magnesium:   Lab Results   Component Value Date    MG 2.2 08/17/2020     Hepatic: No results for input(s): AST, ALT, ALB, BILITOT, ALKPHOS in the last 72 hours. INR: No results for input(s): INR in the last 72 hours.     No intake or output data in the 24 hours ending 08/21/20 0930      Medications    Scheduled Medications:    scopolamine  1 patch Transdermal Q72H    pantoprazole  40 mg Oral BID AC    sucralfate  1 g Oral 2 times per day    ondansetron  4 mg Intravenous Q4H    PARoxetine  50 mg Oral Nightly    gabapentin  800 mg Oral TID    levothyroxine  125 mcg Oral Daily    therapeutic multivitamin-minerals  1 tablet Oral Daily    calcium-vitamin D  1 tablet Oral BID    estradiol  0.5 mg Oral Daily    atenolol  25 mg Oral Daily    vitamin D  5,000 Units Oral Daily    levETIRAcetam  1,000 mg Oral BID    sodium chloride flush  10 mL Intravenous BID    nicotine  1 patch Transdermal Daily    enoxaparin  40 mg Subcutaneous Daily    guaiFENesin  1,200 mg Oral BID     PRN Medications: morphine, promethazine, dicyclomine, LORazepam, sodium chloride flush, potassium chloride **OR** potassium alternative oral replacement **OR** potassium chloride, magnesium sulfate, acetaminophen **OR** acetaminophen, polyethylene glycol, fleet, [DISCONTINUED] promethazine **OR** ondansetron, zolpidem, benzonatate, calcium carbonate, tiZANidine  Infusions:    sodium chloride 75 mL/hr at 08/20/20 1525         Patient Active Problem List   Diagnosis Code    Fibromyalgia M79.7    Lupus (systemic lupus erythematosus) (HCC) M32.9    Chronic pancreatitis (HCC) K86.1    HTN (hypertension) I10    Generalized abdominal pain R10.84    Frequent UTI N39.0    Gastroesophageal reflux disease without esophagitis K21.9    Depression F32.9    Fatty liver disease, nonalcoholic I14.2    Arthritis M19.90    Bilateral low back pain with left-sided sciatica M54.42    Intractable vomiting with nausea R11.2    Hiatal hernia K44.9    Status post laparoscopic sleeve gastrectomy Z98.84    Pseudoseizure F44.5    Chronic pain syndrome G89.4    Drug-seeking/Aberrant behavior Z76.5    Lymph node disorder I89.9    Morbid obesity with BMI of 40.0-44.9, adult (HCC) E66.01, Z68.41    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 Increase frequency of Protonix 40 mg PO BID   Patient advised to f/u with pain management outpatient   No plan for endoscopy at this time   Surgery on board   Will now refer to [facility], since she is very concerned about transportation to      Hematemesis:   IMPROVED  Hgb 10.8 today, stable   Continue Protonix 40 mg BID  Continue Carafate 1 gm BID   Continue Scopolamine patch     Discussed plan of care with patient     Patient clinical, biochemical, and radiological information discussed with Dr. Bernardo Osorio. He agrees with the assessment and plan. Marion Check, CNP  8/21/2020  8:32 AM     Chronic pain  Hb stable  Pain eval further as op in [facility] or     I have seen and examined this patient personally, and independently of the nurse practitioner. The plan was developed mutually at the time of the visit with the patient. Tamika Escalante and myself have spoken with patient, nursing staff and provided written and verbal instructions .     The above note has been reviewed and I agree with the Assessment,  Diagnosis, and Treatment plan as suggested by [facility]

## 2020-08-21 NOTE — PROGRESS NOTES
Attending Progress Note      PCP: Janes Ritchie MD    Patient: Jacque Christianson   Gender: female  : 1970   Age: 44 y.o. MRN: [id number]      Date of Admission: 2020    Chief Complaint:   Chief Complaint   Patient presents with    Abdominal Pain     started a week ago and has progressed; sent by pcp    Emesis    Nausea           Subjective: got dizzy and feel . . hit her head and has ankle pain .          Medications:  Reviewed  Infusion Medications    sodium chloride 75 mL/hr at 20 1525     Scheduled Medications    scopolamine  1 patch Transdermal Q72H    pantoprazole  40 mg Oral BID AC    sucralfate  1 g Oral 2 times per day    ondansetron  4 mg Intravenous Q4H    PARoxetine  50 mg Oral Nightly    gabapentin  800 mg Oral TID    levothyroxine  125 mcg Oral Daily    therapeutic multivitamin-minerals  1 tablet Oral Daily    calcium-vitamin D  1 tablet Oral BID    estradiol  0.5 mg Oral Daily    atenolol  25 mg Oral Daily    vitamin D  5,000 Units Oral Daily    levETIRAcetam  1,000 mg Oral BID    sodium chloride flush  10 mL Intravenous BID    nicotine  1 patch Transdermal Daily    enoxaparin  40 mg Subcutaneous Daily    guaiFENesin  1,200 mg Oral BID     PRN Meds: morphine, promethazine, dicyclomine, LORazepam, sodium chloride flush, potassium chloride **OR** potassium alternative oral replacement **OR** potassium chloride, magnesium sulfate, acetaminophen **OR** acetaminophen, polyethylene glycol, fleet, [DISCONTINUED] promethazine **OR** ondansetron, zolpidem, benzonatate, calcium carbonate, tiZANidine    No intake or output data in the 24 hours ending 20 1411    Exam:  BP (!) 113/47   Pulse 60   Temp 98.1 °F (36.7 °C) (Oral)   Resp 19   Ht 5' 4\" (1.626 m)   Wt 278 lb (126.1 kg)   SpO2 99%   BMI 47.72 kg/m²   General appearance: No distress,   Respiratory:  good air entry , no Rales , No wheezing, or rhonchi,  Cardiovascular: RRR, with normal S1/S2

## 2020-08-21 NOTE — DISCHARGE SUMMARY
Patient: Andi Bermudez MD      Gender: female  : 1970   Age: 44 y.o. MRN: [id number]    Admitting Physician: Isai Garcia MD  Discharge Physician: Isai Garcia MD     Code Status: Full Code     Admit Date: 2020   Discharge Date: 20      Disposition:  Home       Condition at Discharge:  stable . Follow-up appointments:  f/u one week with PCP , and with consultants as recommended . Outpatient to do list: f/u       Discharge Diagnoses: Active Hospital Problems    Diagnosis    Intractable vomiting with nausea [R11.2]     Priority: High    Abdominal pain [R10.9]     Priority: Medium    Anxiety [F41.9]    Seizure ([facility] [address] [R56.9]    History of Jet-en-Y gastric bypass [Z98.84]    Morbid obesity with BMI of 40.0-44.9, adult ([facility] [address] [E66.01, Z68.41]       History of Present Illness:  Andre Pope is a 44 y.o. 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  Pt presented to ER for evaluation for worsening nausea and vomiting with poor oral intake and not able to take her PO meds . In ER lab data non specific .and Abdomen CT revealed :\  1. Redemonstration of scattered mild bilateral pneumobilia and mild air   within the common bile duct in setting of cholecystectomy.  Differential   diagnostic considerations include incompetent sphincter of Oddi,   biliary-enteric surgical anastomosis, spontaneous biliary-enteric fistula,   recent biliary instrumentation.  Recommend clinical correlation. 2. Gastric resection postoperative findings without associated complication visualized.    3. Left renal inferior collecting system atenolol (TENORMIN) 25 MG tablet Take 25 mg by mouth daily      Multiple Vitamin (MVI, BARIATRIC ADVANTAGE MULTI-FORMULA, CHEW TAB) Take 1 tablet by mouth daily  Qty: 30 tablet, Refills: 5      Calcium Citrate-Vitamin D (CALCIUM + VIT D, BARIATRIC ADVANTAGE, CHEWABLE TABLET) Take 1 tablet by mouth 2 times daily  Qty: 120 tablet, Refills: 5      levothyroxine (SYNTHROID) 125 MCG tablet Take 1 tablet by mouth Daily  Qty: 30 tablet, Refills: 0      gabapentin (NEURONTIN) 800 MG tablet Take 800 mg by mouth 3 times daily      PARoxetine (PAXIL) 30 MG tablet Take 50 mg by mouth nightly       albuterol sulfate  (90 Base) MCG/ACT inhaler            Current Discharge Medication List          Discharge ROS:  A complete review of systems was asked and negative except for nausea      Discharge Exam:    /72   Pulse 56   Temp 98.5 °F (36.9 °C) (Oral)   Resp 13   Ht 5' 4\" (1.626 m)   Wt 278 lb (126.1 kg)   SpO2 100%   BMI 47.72 kg/m²   General appearance:  NAD  Heart[de-identified] Normal s1/s2, RRR, no murmurs, gallops, or rubs. No leg edema  Lungs:  Clear to auscultation, bilaterally without Rales/Wheezes/Rhonchi. Abdomen: Soft, non-tender, non-distended, bowel sounds present  Musculoskeletal:   no cyanosis, no edema  Neurologic:  Cranial nerves: II-XII intact, grossly non-focal.  Psychiatric:  A & O x3      Labs:  For convenience and continuity at follow-up the following most recent labs are provided:    Lab Results   Component Value Date    WBC 6.5 08/21/2020    HGB 10.8 08/21/2020    HCT 38.5 08/21/2020    MCV 92.5 08/21/2020     08/21/2020     08/19/2020    K 4.1 08/19/2020     08/19/2020    CO2 21 08/19/2020    BUN 14 08/19/2020    CREATININE 0.8 08/19/2020    CALCIUM 9.4 08/19/2020    PHOS 4.4 12/04/2015    BNP 88 11/26/2010    ALKPHOS 97 08/17/2020    ALT 22 08/17/2020    AST 19 08/17/2020    BILITOT 0.2 08/17/2020    BILIDIR 0.2 01/02/2016    LABALBU 3.9 08/17/2020    LABALBU 8 11/18/2015 TRIG 161 07/23/2015     Lab Results   Component Value Date    INR 0.93 08/07/2020    INR 0.93 01/10/2020    INR ? 01/09/2020           Chart review shows recent radiographs:  Xr Ankle Right (min 3 Views)    Result Date: 8/21/2020  EXAMINATION: THREE XRAY VIEWS OF THE RIGHT ANKLE 8/21/2020 1:06 pm COMPARISON: None. HISTORY: ORDERING SYSTEM PROVIDED HISTORY: Unwitnessed Fall TECHNOLOGIST PROVIDED HISTORY: Reason for exam:->Unwitnessed Fall Reason for Exam: unwitnessed fall FINDINGS: Mild degenerative change identified at the ankle joint. No fracture or dislocation is identified. No osseous destructive process is identified. Calcaneal spurring is seen at the plantar aponeurosis. No radiopaque foreign body is identified. Limited evaluation on the lateral view given obliquity of the film. Mild degenerative change identified at the ankle, without acute osseous abnormality identified. Ct Head Wo Contrast    Result Date: 8/21/2020  EXAMINATION: CT OF THE HEAD WITHOUT CONTRAST  8/21/2020 2:38 pm TECHNIQUE: CT of the head was performed without the administration of intravenous contrast. Dose modulation, iterative reconstruction, and/or weight based adjustment of the mA/kV was utilized to reduce the radiation dose to as low as reasonably achievable. COMPARISON: MR brain 08/06/2020 and CT brain 07/17/2020 HISTORY: ORDERING SYSTEM PROVIDED HISTORY: Unwitnessed fall TECHNOLOGIST PROVIDED HISTORY: Reason for exam:->Unwitnessed fall Has a \"code stroke\" or \"stroke alert\" been called? ->No Is the patient pregnant?->No Reason for Exam: Unwitnessed fall Acuity: Acute Type of Exam: Initial FINDINGS: BRAIN/VENTRICLES: There is no acute infarct or acute intracranial hemorrhage present. There is no mass effect or midline shift present. There is no ventriculomegaly or abnormal extra-axial fluid collection present. ORBITS: Limited evaluation of the orbits is unremarkable.  SINUSES: The paranasal sinuses and mastoid air cells are clear. SOFT TISSUES/SKULL:  No lytic or blastic osseous lesions are identified. No skull fracture is identified. No acute intracranial process identified. Ct Abdomen Pelvis W Iv Contrast Additional Contrast? None    Result Date: 8/17/2020  EXAMINATION: CT OF THE ABDOMEN AND PELVIS WITH CONTRAST 8/17/2020 4:28 pm TECHNIQUE: CT of the abdomen and pelvis was performed with the administration of intravenous contrast. Multiplanar reformatted images are provided for review. Dose modulation, iterative reconstruction, and/or weight based adjustment of the mA/kV was utilized to reduce the radiation dose to as low as reasonably achievable. COMPARISON: CT abdomen and pelvis with contrast July 21, 2020. HISTORY: ORDERING SYSTEM PROVIDED HISTORY: abd pain, TECHNOLOGIST PROVIDED HISTORY: Reason for exam:->abd pain, Additional Contrast?->None Reason for Exam: abd pain Acuity: Acute Type of Exam: Initial Additional signs and symptoms: abd pain, emesis, nausea FINDINGS: Lower Chest: The lung bases are well aerated. Pleural surfaces are unremarkable and no evidence of pleural effusion is identified. Organs: The gallbladder is surgically absent. Scattered mild bilateral pneumobilia is present with mild air seen within the common bile duct. Inferior left renal punctate calculus is noted within the collecting system without associated urinary obstruction. The liver, spleen, pancreas, adrenal glands, kidneys, are otherwise unremarkable in appearance. GI/Bowel: Gastric resection postoperative findings are noted without evidence of associated complication seen. The stomach is otherwise unremarkable without wall thickening or distention. Bowel loops are unremarkable in appearance without evidence of obstruction, distension or mucosal thickening. Pelvis: The urinary bladder is well distended and unremarkable in appearance. No evidence of pelvic free fluid is seen.  Peritoneum/Retroperitoneum: No evidence of retroperitoneal or intraperitoneal lymphadenopathy is identified. No evidence of intraperitoneal free fluid is seen. Bones/Soft Tissues: The bones, skeletal muscle bundles, fascial planes and subcutaneous soft tissues are unremarkable in appearance. 1. Redemonstration of scattered mild bilateral pneumobilia and mild air within the common bile duct in setting of cholecystectomy. Differential diagnostic considerations include incompetent sphincter of Oddi, biliary-enteric surgical anastomosis, spontaneous biliary-enteric fistula, recent biliary instrumentation. Recommend clinical correlation. 2. Gastric resection postoperative findings without associated complication visualized. 3. Left renal inferior collecting system punctate calculus without associated urinary obstruction. Xr Chest Portable    Result Date: 8/5/2020  EXAMINATION: ONE XRAY VIEW OF THE CHEST 8/5/2020 12:45 pm COMPARISON: July 20, 2020 HISTORY: ORDERING SYSTEM PROVIDED HISTORY: dizziness, sob TECHNOLOGIST PROVIDED HISTORY: Reason for exam:->dizziness, sob Reason for Exam: sob, dizziness Initial evaluation, acute shortness of breath, dizziness FINDINGS: The cardiomediastinal silhouette is moderately enlarged. Lung volumes are low. There is mild pulmonary venous congestion. Left-sided port is noted, with distal tip projecting over the SVC. There is some kinking of the catheter noted in the subclavian region. Lungs are clear with exception of mild scarring at the left costophrenic angle. No pleural effusion or pneumothorax. Cardiomegaly with mild congestive changes. Mri Brain W Wo Contrast    Result Date: 8/6/2020  EXAMINATION: MRI OF THE BRAIN WITHOUT AND WITH CONTRAST  8/6/2020 5:32 pm TECHNIQUE: Multiplanar multisequence MRI of the head/brain was performed without and with the administration of intravenous contrast. COMPARISON: None.  HISTORY: ORDERING SYSTEM PROVIDED HISTORY: seizure, headaches TECHNOLOGIST PROVIDED HISTORY: Reason for

## 2020-08-21 NOTE — PROGRESS NOTES
Client given morphine for pain 8 on a scale 0-10. Client in bed, 2/4 side rails up, call light in reach of client. No distress noted. Client denies any other needs or questions to be answered at this time. Primary nurse advised.

## 2020-08-22 NOTE — PROGRESS NOTES
Discharged AMA. Heparin lock for chest power port given as ordered. Patient alert and oriented. Alert and oriented x 4. Ambulatory. Patient is advised to go to ED for life threathening symptoms. Vrbalized understanding.

## 2020-08-22 NOTE — PROGRESS NOTES
Patient is sitting up in bed. Patient had episodes of emesis and reports abdominal pain. Patient states she would like to be discharged or leave AMA, if she is unable to have IV pain medications. Patient refused to take PO medications. Patient's nurse has been notified.

## 2020-08-24 ENCOUNTER — TELEPHONE (OUTPATIENT)
Dept: BARIATRICS/WEIGHT MGMT | Age: 50
End: 2020-08-24

## 2020-08-24 LAB
EKG ATRIAL RATE: 53 BPM
EKG DIAGNOSIS: NORMAL
EKG P AXIS: 39 DEGREES
EKG P-R INTERVAL: 192 MS
EKG Q-T INTERVAL: 420 MS
EKG QRS DURATION: 96 MS
EKG QTC CALCULATION (BAZETT): 394 MS
EKG R AXIS: -18 DEGREES
EKG T AXIS: 12 DEGREES
EKG VENTRICULAR RATE: 53 BPM

## 2020-08-24 RX ORDER — PROMETHAZINE HYDROCHLORIDE 25 MG/1
25 TABLET ORAL EVERY 6 HOURS PRN
Qty: 30 TABLET | Refills: 2 | Status: CANCELLED | OUTPATIENT
Start: 2020-08-24 | End: 2020-08-31

## 2020-08-26 RX ORDER — PROMETHAZINE HYDROCHLORIDE 25 MG/1
25 TABLET ORAL EVERY 6 HOURS PRN
Qty: 30 TABLET | Refills: 2 | Status: SHIPPED | OUTPATIENT
Start: 2020-08-26 | End: 2020-09-02

## 2020-09-23 ENCOUNTER — TELEPHONE (OUTPATIENT)
Dept: BARIATRICS/WEIGHT MGMT | Age: 50
End: 2020-09-23

## 2020-09-23 ENCOUNTER — VIRTUAL VISIT (OUTPATIENT)
Dept: BARIATRICS/WEIGHT MGMT | Age: 50
End: 2020-09-23
Payer: COMMERCIAL

## 2020-09-23 PROCEDURE — 99443 PR PHYS/QHP TELEPHONE EVALUATION 21-30 MIN: CPT | Performed by: SURGERY

## 2020-09-23 ASSESSMENT — ENCOUNTER SYMPTOMS
VOICE CHANGE: 0
TROUBLE SWALLOWING: 0
SHORTNESS OF BREATH: 0
PHOTOPHOBIA: 0
ABDOMINAL PAIN: 1
VOMITING: 0
BLOOD IN STOOL: 0
WHEEZING: 0
COLOR CHANGE: 0
NAUSEA: 0
ANAL BLEEDING: 0
CONSTIPATION: 0
DIARRHEA: 0
SORE THROAT: 0
COUGH: 0

## 2020-09-23 NOTE — PROGRESS NOTES
2020    TELEHEALTH EVALUATION -- Audio/Visual (During XHGYD-09 public health emergency)    HPI:    Andre Pope (:  1970) has requested an audio/video evaluation for the following concern(s):    Pain in my side, but better than I was. .  2019 Sleeve PROCEDURE: Laparoscopic robotic DaVinci-assisted sleeve gastrectomy around a  38-Kaz[place] bougie + Hiatal hernia primary repair + extensive lysis of her severe extensive adhesions of her omentum with the anterior abdominal wall and the left lobe of the liver to her stomach (>1hr). 20 yrs ago, had the gastric band, that was removed after she lost 100 Lbs. Sari Ramirez Upper GI :    Impression    The patient was only able to tolerate 2 mouthfuls of non diluted    Gastrografin.  Both episodes elicited severe vomiting.         No esophageal stricture was identified.         No gastro jejunal anastomotic leak. THIS WAS A MISTAKE!!!      Review of Systems   Constitutional: Negative for activity change, chills, diaphoresis and fever. HENT: Negative for sore throat, trouble swallowing and voice change. Eyes: Negative for photophobia and visual disturbance. Respiratory: Negative for cough, shortness of breath and wheezing. Cardiovascular: Negative for chest pain, palpitations and leg swelling. Gastrointestinal: Positive for abdominal pain. Negative for anal bleeding, blood in stool, constipation, diarrhea, nausea and vomiting. Endocrine: Negative for cold intolerance, heat intolerance, polydipsia and polyuria. Genitourinary: Negative for dysuria, frequency and hematuria. Musculoskeletal: Negative for joint swelling, myalgias and neck stiffness. Skin: Negative for color change and rash. Neurological: Negative for seizures, speech difficulty, light-headedness and numbness. Hematological: Negative for adenopathy. Does not bruise/bleed easily. Prior to Visit Medications    Medication Sig Taking?  Authorizing Provider   sucralfate (CARAFATE) 1 GM tablet Take 1 tablet by mouth every 12 hours  Tonita Buerger, MD   albuterol sulfate  (90 Base) MCG/ACT inhaler   Historical Provider, MD   ondansetron (ZOFRAN ODT) 4 MG disintegrating tablet Take 1 tablet by mouth every 8 hours as needed for Nausea or Vomiting  Darline Cotto MD   levETIRAcetam (KEPPRA) 1000 MG tablet Take 1 tablet by mouth 2 times daily  Tonita Buerger, MD   LORazepam (ATIVAN) 1 MG tablet Take 1 mg by mouth as needed for Anxiety. Historical Provider, MD   pantoprazole (PROTONIX) 40 MG tablet Take 1 tablet by mouth every morning (before breakfast)  Akash Farr MD   dicyclomine (BENTYL) 10 MG capsule Take 1 capsule by mouth 3 times daily as needed (abdominal pain)  Ely Johnson PA-C   oxyCODONE-acetaminophen (PERCOCET) 5-325 MG per tablet Take 1 tablet by mouth.  Every 4-6 hours PRN  Historical Provider, MD   Cholecalciferol (VITAMIN D3) 5000 units TABS Take 1 tablet by mouth daily  Historical Provider, MD   estradiol (ESTRACE) 0.5 MG tablet Take 0.5 mg by mouth daily  Historical Provider, MD   atenolol (TENORMIN) 25 MG tablet Take 25 mg by mouth daily  Historical Provider, MD   Multiple Vitamin (MVI, BARIATRIC ADVANTAGE MULTI-FORMULA, CHEW TAB) Take 1 tablet by mouth daily  Akash Farr MD   Calcium Citrate-Vitamin D (CALCIUM + VIT D, BARIATRIC ADVANTAGE, CHEWABLE TABLET) Take 1 tablet by mouth 2 times daily  Akash Farr MD   levothyroxine (SYNTHROID) 125 MCG tablet Take 1 tablet by mouth Daily  Patient taking differently: Take 125 mcg by mouth nightly   Kaz Latif MD   gabapentin (NEURONTIN) 800 MG tablet Take 800 mg by mouth 3 times daily  Historical Provider, MD   PARoxetine (PAXIL) 30 MG tablet Take 50 mg by mouth nightly   Historical Provider, MD       Social History     Tobacco Use    Smoking status: Never Smoker    Smokeless tobacco: Never Used   Substance Use Topics    Alcohol use: No     Alcohol/week: 0.0 standard drinks    Drug use: No        Allergies   Allergen Reactions    Aspirin Palpitations     \"My Heart Rate Elevates\"    Shellfish-Derived Products Swelling    Toradol [Ketorolac Tromethamine] Rash    Compazine [Prochlorperazine]     Reglan [Metoclopramide] Itching   ,   Past Medical History:   Diagnosis Date    Acid reflux     Anemia     Anxiety     Arthritis     Hands, Back And Ankles    Bleeding ulcer 2014    \"I Had Ulcers In My Stomach And Colon\"/ per pt on 8/12/2019\"they said recently having some blood in my stomach- in July ( 2019)could not find where coming from \"    Bronchitis Last Episode 2014    Chronic back pain     [facility]     [facility]    COPD (chronic obstructive pulmonary disease) ([facility] [address]     Sees Dr. Dave Pantoja Depression     Fibromyalgia Dx 2013    H/O echocardiogram 8/11/15    EF >55%. LA to be at the upper limit of normal in size. LV hypertrophy with normal LV systolic, but abnormal diastolic function. Normal valvular structures and function.  H/O echocardiogram 08/30/2018    EF 55-60%    Hiatal hernia     History of blood transfusion 09/2015 And 2018    No Reaction To Blood Transfusions Received    [place] (hard of hearing)     Right Ear    Hx of cardiac catheterization 10/24/2010    Angiographically normal coronary arteries w/ normal LV function and wall motion.  Hx of transesophageal echocardiography (PILO) for monitoring 12/2/2010    EF 55-60%. WNL.    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 name   Alois Rings cysts     \"they found that I have a kidney cyst but not sure which side\" per pt on 7/15/2020    Lupus ([facility] [address] Dx 2013    \"Rheumatoid Lupus\"    MDRO (multiple drug resistant organisms) resistance     4 months ago [facility]    Morbid obesity (HCC)     MRSA bacteremia     Nausea     \"Most Of The Time\"    Pain management     Sees Dr. Rick Neely, Once A Month    Pancreatitis chronic Dx 2001    Pneumonia Dx 11-15    Shortness of breath on exertion     Shortness of breath on exertion     Sleep apnea     Has CPAP\"no longer use the cpap since lost weight\"    Staph infection Dx 11-15    Left Foot    Staph infection Dx 11-15    \"Left Foot\"    Teeth missing     Upper And Lower    Thyroid disease     Wears glasses     To Read   ,   Past Surgical History:   Procedure Laterality Date    APPENDECTOMY  02/1998    Done When Tubes And Ovaries Were Removed    CARDIAC CATHETERIZATION  10/24/2010    CATHETER REMOVAL N/A 7/16/2019    PORT REMOVAL performed by Marielle Hurt MD at [address] [facility]  08/27/1991    CHOLECYSTECTOMY, LAPAROSCOPIC  1990's    COLONOSCOPY  Last Done In 2000's    DENTAL SURGERY      Teeth Extracted In Past    ENDOSCOPY, COLON, DIAGNOSTIC  Last Done In 2018    FOOT DEBRIDEMENT Left 6/16/2019    FIRST METATARSAL DEBRIDEMENT INCISION AND DRAINAGE. EXCISION OF ULCER.  RESECTION OF BONE. 1ST METATARSAL POWER LAVAGE AND BONE CEMENT performed by Lenny Mckinney DPM at [facility] Left Last Done In 2016     Dr. Karl Gudino, \" About 6 Surgeries Done Because Of Staph Infection\"    HIATAL HERNIA REPAIR N/A 2/12/2019    HERNIA HIATAL LAPAROSCOPIC ROBOTIC performed by Marielle Hurt MD at [facility]  10/1997    Partial Abdominal Hysterectomy    INSERTION / REMOVAL / REPLACEMENT VENOUS ACCESS CATHETER N/A 8/15/2019    PORT INSERTION performed by Marielle Hurt MD at [facility]    removed after 6 months    LUNG REMOVAL, PARTIAL Left 2008    Benign    OTHER SURGICAL HISTORY  02/1998    \"Tubes And Ovaries Removed, Appendectomy Also Done\"    OTHER SURGICAL HISTORY  Last Done 7-15-16 Mediport Insertion \"Total Of Six Done, Removed Last Mediport In 2014\"    PORT SURGERY N/A 1/15/2020    PORT REMOVAL performed by Argelia Heard MD at [address] [facility] [address] N/A 7/6/2020    PORT INSERTION performed by Argelia Heard MD at [address] N/A 2/12/2019    GASTRECTOMY SLEEVE LAPAROSCOPIC ROBOTIC performed by Argelia Heard MD at [address] ENDOSCOPY  08/27/2018    UPPER GASTROINTESTINAL ENDOSCOPY N/A 4/2/2019    EGD CONTROL HEMORRHAGE with epi injection at bleeding site performed by Argelia Heard MD at [address] N/A 6/19/2019    EGD DIAGNOSTIC ONLY performed by Argelia Heard MD at [address] N/A 7/22/2019    EGD DIAGNOSTIC ONLY performed by Fadumo Tee MD at [address] N/A 10/14/2019    EGD DIAGNOSTIC ONLY performed by Argelia Heard MD at [address] N/A 7/17/2020    EGJ DILATATION BALLOON WITH 18-20 MM BALLOON, DILATED TO 20 MM. performed by Argelia Heard MD at [facility] ENDOSCOPY   ,   Social History     Tobacco Use    Smoking status: Never Smoker    Smokeless tobacco: Never Used   Substance Use Topics    Alcohol use: No     Alcohol/week: 0.0 standard drinks    Drug use: No   ,   Family History   Problem Relation Age of Onset    Diabetes Mother         \"Borderline Diabetes\"    High Blood Pressure Mother     Obesity Mother     Arthritis Mother     Heart Disease Mother     High Cholesterol Mother     Vision Loss Mother     Diabetes Father     High Blood Pressure Father     Asthma Father     Cancer Father         prostate cancer    High Blood Pressure Brother     Asthma Son    Wilma Linton Vision Loss Son     Lupus Daughter     Other Daughter         \"Alot Of Female Problems\"   ,   Immunization History   Administered Date(s) Administered    Influenza Vaccine, unspecified formulation 10/01/2008, 11/02/2015    Influenza Virus Vaccine 12/01/2018    Influenza, Quadv, IM, PF (6 mo and older Fluzone, Flulaval, Fluarix, and 3 yrs and older Afluria) 09/30/2019    Pneumococcal Conjugate 13-valent (Jennifer Prayer) 01/01/2006   ,   Health Maintenance   Topic Date Due    HIV screen  10/23/1983    DTaP/Tdap/Td vaccine (1 - Tdap) 10/23/1987    Cervical cancer screen  10/23/1989    Lipid screen  10/23/2008    Colon Cancer Screen FIT/FOBT  10/23/2018    Shingles Vaccine (1 of 2) 10/23/2018    Annual Wellness Visit (AWV)  06/18/2019    Diabetes screen  12/21/2019    Flu vaccine (1) 09/01/2020    Breast cancer screen  02/10/2022    Hepatitis A vaccine  Aged Out    Hepatitis B vaccine  Aged Out    Hib vaccine  Aged Out    Meningococcal (ACWY) vaccine  Aged Out    Pneumococcal 0-64 years Vaccine  Aged Out         ASSESSMENT/PLAN:  1. Status post laparoscopic sleeve gastrectomy    - FL UGI W KUB; Future      Return in about 1 week (around 9/30/2020) for For imaging and tests results review, Follow up Symptoms. Meseret Dougherty is a 44 y.o. female being evaluated by a Virtual Visit (video visit) encounter to address concerns as mentioned above. A caregiver was present when appropriate. Due to this being a TeleHealth encounter (During [facility] [employer] [facility] public health emergency), evaluation of the following organ systems was limited: Vitals/Constitutional/EENT/Resp/CV/GI//MS/Neuro/Skin/Heme-Lymph-Imm. Pursuant to the emergency declaration under the [address] [place] [address], [address] [place] [address] [facility] authority and the [employer] Act, this Virtual Visit was conducted with patient's (and/or legal guardian's) consent, to reduce the patient's risk of exposure to COVID-19 and provide necessary medical care.   The patient (and/or legal guardian) has also been advised to contact this office for

## 2020-09-24 ENCOUNTER — APPOINTMENT (OUTPATIENT)
Dept: GENERAL RADIOLOGY | Age: 50
End: 2020-09-24
Payer: COMMERCIAL

## 2020-09-24 ENCOUNTER — HOSPITAL ENCOUNTER (OUTPATIENT)
Age: 50
Setting detail: OBSERVATION
Discharge: HOME | End: 2020-09-28
Attending: FAMILY MEDICINE | Admitting: FAMILY MEDICINE
Payer: COMMERCIAL

## 2020-09-24 LAB
ALBUMIN SERPL-MCNC: 4.2 GM/DL (ref 3.4–5)
ALP BLD-CCNC: 100 IU/L (ref 40–129)
ALT SERPL-CCNC: 20 U/L (ref 10–40)
ANION GAP SERPL CALCULATED.3IONS-SCNC: 12 MMOL/L (ref 4–16)
AST SERPL-CCNC: 26 IU/L (ref 15–37)
BACTERIA: ABNORMAL /HPF
BASOPHILS ABSOLUTE: 0 K/CU MM
BASOPHILS RELATIVE PERCENT: 0.7 % (ref 0–1)
BILIRUB SERPL-MCNC: 0.1 MG/DL (ref 0–1)
BILIRUBIN URINE: ABNORMAL MG/DL
BLOOD, URINE: NEGATIVE
BUN BLDV-MCNC: 25 MG/DL (ref 6–23)
CALCIUM SERPL-MCNC: 10.2 MG/DL (ref 8.3–10.6)
CHLORIDE BLD-SCNC: 107 MMOL/L (ref 99–110)
CLARITY: ABNORMAL
CO2: 21 MMOL/L (ref 21–32)
COLOR: YELLOW
CREAT SERPL-MCNC: 0.9 MG/DL (ref 0.6–1.1)
DIFFERENTIAL TYPE: ABNORMAL
EOSINOPHILS ABSOLUTE: 0.3 K/CU MM
EOSINOPHILS RELATIVE PERCENT: 5.3 % (ref 0–3)
GFR AFRICAN AMERICAN: >60 ML/MIN/1.73M2
GFR NON-AFRICAN AMERICAN: >60 ML/MIN/1.73M2
GLUCOSE BLD-MCNC: 94 MG/DL (ref 70–99)
GLUCOSE, URINE: NEGATIVE MG/DL
HCG QUALITATIVE: NORMAL
HCT VFR BLD CALC: 36.5 % (ref 37–47)
HEMOGLOBIN: 11.2 GM/DL (ref 12.5–16)
HYALINE CASTS: 10 /LPF
ICTOTEST: NEGATIVE
IMMATURE NEUTROPHIL %: 0 % (ref 0–0.43)
KETONES, URINE: NEGATIVE MG/DL
LEUKOCYTE ESTERASE, URINE: NEGATIVE
LIPASE: 20 IU/L (ref 13–60)
LYMPHOCYTES ABSOLUTE: 2.2 K/CU MM
LYMPHOCYTES RELATIVE PERCENT: 40.7 % (ref 24–44)
MCH RBC QN AUTO: 26.5 PG (ref 27–31)
MCHC RBC AUTO-ENTMCNC: 30.7 % (ref 32–36)
MCV RBC AUTO: 86.3 FL (ref 78–100)
MONOCYTES ABSOLUTE: 0.5 K/CU MM
MONOCYTES RELATIVE PERCENT: 9.2 % (ref 0–4)
MUCUS: ABNORMAL HPF
NITRITE URINE, QUANTITATIVE: NEGATIVE
NUCLEATED RBC %: 0 %
PDW BLD-RTO: 13.3 % (ref 11.7–14.9)
PH, URINE: 5 (ref 5–8)
PLATELET # BLD: 197 K/CU MM (ref 140–440)
PMV BLD AUTO: 10.2 FL (ref 7.5–11.1)
POTASSIUM SERPL-SCNC: 4.4 MMOL/L (ref 3.5–5.1)
PROTEIN UA: 30 MG/DL
RBC # BLD: 4.23 M/CU MM (ref 4.2–5.4)
RBC URINE: 1 /HPF (ref 0–6)
SEGMENTED NEUTROPHILS ABSOLUTE COUNT: 2.4 K/CU MM
SEGMENTED NEUTROPHILS RELATIVE PERCENT: 44.1 % (ref 36–66)
SODIUM BLD-SCNC: 140 MMOL/L (ref 135–145)
SPECIFIC GRAVITY UA: 1.03 (ref 1–1.03)
SQUAMOUS EPITHELIAL: 17 /HPF
TOTAL IMMATURE NEUTOROPHIL: 0 K/CU MM
TOTAL NUCLEATED RBC: 0 K/CU MM
TOTAL PROTEIN: 6.3 GM/DL (ref 6.4–8.2)
TRICHOMONAS: ABNORMAL /HPF
UROBILINOGEN, URINE: NORMAL MG/DL (ref 0.2–1)
WBC # BLD: 5.4 K/CU MM (ref 4–10.5)
WBC UA: 2 /HPF (ref 0–5)

## 2020-09-24 PROCEDURE — 96375 TX/PRO/DX INJ NEW DRUG ADDON: CPT

## 2020-09-24 PROCEDURE — 83690 ASSAY OF LIPASE: CPT

## 2020-09-24 PROCEDURE — 6370000000 HC RX 637 (ALT 250 FOR IP): Performed by: FAMILY MEDICINE

## 2020-09-24 PROCEDURE — 94761 N-INVAS EAR/PLS OXIMETRY MLT: CPT

## 2020-09-24 PROCEDURE — 85025 COMPLETE CBC W/AUTO DIFF WBC: CPT

## 2020-09-24 PROCEDURE — 6360000004 HC RX CONTRAST MEDICATION: Performed by: SURGERY

## 2020-09-24 PROCEDURE — 96372 THER/PROPH/DIAG INJ SC/IM: CPT

## 2020-09-24 PROCEDURE — 6360000002 HC RX W HCPCS: Performed by: FAMILY MEDICINE

## 2020-09-24 PROCEDURE — G0378 HOSPITAL OBSERVATION PER HR: HCPCS

## 2020-09-24 PROCEDURE — 80053 COMPREHEN METABOLIC PANEL: CPT

## 2020-09-24 PROCEDURE — 6360000002 HC RX W HCPCS: Performed by: PHYSICIAN ASSISTANT

## 2020-09-24 PROCEDURE — 2580000003 HC RX 258: Performed by: FAMILY MEDICINE

## 2020-09-24 PROCEDURE — 96374 THER/PROPH/DIAG INJ IV PUSH: CPT

## 2020-09-24 PROCEDURE — 99234 HOSP IP/OBS SM DT SF/LOW 45: CPT | Performed by: SURGERY

## 2020-09-24 PROCEDURE — 74240 X-RAY XM UPR GI TRC 1CNTRST: CPT

## 2020-09-24 PROCEDURE — 81001 URINALYSIS AUTO W/SCOPE: CPT

## 2020-09-24 PROCEDURE — 96376 TX/PRO/DX INJ SAME DRUG ADON: CPT

## 2020-09-24 PROCEDURE — 84703 CHORIONIC GONADOTROPIN ASSAY: CPT

## 2020-09-24 PROCEDURE — 99285 EMERGENCY DEPT VISIT HI MDM: CPT

## 2020-09-24 RX ORDER — SODIUM CHLORIDE 0.9 % (FLUSH) 0.9 %
10 SYRINGE (ML) INJECTION EVERY 12 HOURS SCHEDULED
Status: DISCONTINUED | OUTPATIENT
Start: 2020-09-24 | End: 2020-09-28 | Stop reason: HOSPADM

## 2020-09-24 RX ORDER — DICYCLOMINE HYDROCHLORIDE 10 MG/1
10 CAPSULE ORAL 3 TIMES DAILY PRN
Status: DISCONTINUED | OUTPATIENT
Start: 2020-09-24 | End: 2020-09-28 | Stop reason: HOSPADM

## 2020-09-24 RX ORDER — SODIUM PHOSPHATE, DIBASIC AND SODIUM PHOSPHATE, MONOBASIC 7; 19 G/133ML; G/133ML
1 ENEMA RECTAL DAILY PRN
Status: DISCONTINUED | OUTPATIENT
Start: 2020-09-24 | End: 2020-09-28 | Stop reason: HOSPADM

## 2020-09-24 RX ORDER — PROMETHAZINE HYDROCHLORIDE 25 MG/ML
25 INJECTION, SOLUTION INTRAMUSCULAR; INTRAVENOUS ONCE
Status: COMPLETED | OUTPATIENT
Start: 2020-09-24 | End: 2020-09-24

## 2020-09-24 RX ORDER — ONDANSETRON 4 MG/1
4 TABLET, ORALLY DISINTEGRATING ORAL EVERY 8 HOURS PRN
Status: DISCONTINUED | OUTPATIENT
Start: 2020-09-24 | End: 2020-09-28

## 2020-09-24 RX ORDER — SODIUM CHLORIDE 0.9 % (FLUSH) 0.9 %
10 SYRINGE (ML) INJECTION PRN
Status: DISCONTINUED | OUTPATIENT
Start: 2020-09-24 | End: 2020-09-28 | Stop reason: HOSPADM

## 2020-09-24 RX ORDER — MAGNESIUM SULFATE IN WATER 40 MG/ML
2 INJECTION, SOLUTION INTRAVENOUS PRN
Status: DISCONTINUED | OUTPATIENT
Start: 2020-09-24 | End: 2020-09-28 | Stop reason: HOSPADM

## 2020-09-24 RX ORDER — ONDANSETRON 2 MG/ML
4 INJECTION INTRAMUSCULAR; INTRAVENOUS ONCE
Status: COMPLETED | OUTPATIENT
Start: 2020-09-24 | End: 2020-09-24

## 2020-09-24 RX ORDER — POTASSIUM CHLORIDE 7.45 MG/ML
10 INJECTION INTRAVENOUS PRN
Status: DISCONTINUED | OUTPATIENT
Start: 2020-09-24 | End: 2020-09-28 | Stop reason: HOSPADM

## 2020-09-24 RX ORDER — LORAZEPAM 1 MG/1
1 TABLET ORAL PRN
Status: DISCONTINUED | OUTPATIENT
Start: 2020-09-24 | End: 2020-09-27

## 2020-09-24 RX ORDER — PAROXETINE HYDROCHLORIDE 20 MG/1
50 TABLET, FILM COATED ORAL NIGHTLY
Status: DISCONTINUED | OUTPATIENT
Start: 2020-09-24 | End: 2020-09-28 | Stop reason: HOSPADM

## 2020-09-24 RX ORDER — OXYCODONE HYDROCHLORIDE AND ACETAMINOPHEN 5; 325 MG/1; MG/1
1 TABLET ORAL EVERY 6 HOURS PRN
Status: DISCONTINUED | OUTPATIENT
Start: 2020-09-24 | End: 2020-09-28

## 2020-09-24 RX ORDER — LEVETIRACETAM 500 MG/1
1000 TABLET ORAL 2 TIMES DAILY
Status: DISCONTINUED | OUTPATIENT
Start: 2020-09-24 | End: 2020-09-28 | Stop reason: HOSPADM

## 2020-09-24 RX ORDER — ONDANSETRON 2 MG/ML
4 INJECTION INTRAMUSCULAR; INTRAVENOUS EVERY 6 HOURS PRN
Status: DISCONTINUED | OUTPATIENT
Start: 2020-09-24 | End: 2020-09-28

## 2020-09-24 RX ORDER — SUCRALFATE 1 G/1
1 TABLET ORAL EVERY 12 HOURS SCHEDULED
Status: DISCONTINUED | OUTPATIENT
Start: 2020-09-24 | End: 2020-09-28 | Stop reason: HOSPADM

## 2020-09-24 RX ORDER — SODIUM CHLORIDE 9 MG/ML
INJECTION, SOLUTION INTRAVENOUS CONTINUOUS
Status: DISCONTINUED | OUTPATIENT
Start: 2020-09-24 | End: 2020-09-28

## 2020-09-24 RX ORDER — PROMETHAZINE HYDROCHLORIDE 25 MG/ML
6.25 INJECTION, SOLUTION INTRAMUSCULAR; INTRAVENOUS EVERY 6 HOURS PRN
Status: DISCONTINUED | OUTPATIENT
Start: 2020-09-24 | End: 2020-09-25

## 2020-09-24 RX ORDER — NICOTINE 21 MG/24HR
1 PATCH, TRANSDERMAL 24 HOURS TRANSDERMAL DAILY
Status: DISCONTINUED | OUTPATIENT
Start: 2020-09-24 | End: 2020-09-28 | Stop reason: HOSPADM

## 2020-09-24 RX ORDER — ACETAMINOPHEN 325 MG/1
650 TABLET ORAL EVERY 6 HOURS PRN
Status: DISCONTINUED | OUTPATIENT
Start: 2020-09-24 | End: 2020-09-28 | Stop reason: HOSPADM

## 2020-09-24 RX ORDER — ESTRADIOL 1 MG/1
0.5 TABLET ORAL DAILY
Status: DISCONTINUED | OUTPATIENT
Start: 2020-09-24 | End: 2020-09-28 | Stop reason: HOSPADM

## 2020-09-24 RX ORDER — GABAPENTIN 400 MG/1
800 CAPSULE ORAL 3 TIMES DAILY
Status: DISCONTINUED | OUTPATIENT
Start: 2020-09-24 | End: 2020-09-28 | Stop reason: HOSPADM

## 2020-09-24 RX ORDER — POTASSIUM CHLORIDE 20 MEQ/1
40 TABLET, EXTENDED RELEASE ORAL PRN
Status: DISCONTINUED | OUTPATIENT
Start: 2020-09-24 | End: 2020-09-28 | Stop reason: HOSPADM

## 2020-09-24 RX ORDER — M-VIT,TX,IRON,MINS/CALC/FOLIC 27MG-0.4MG
1 TABLET ORAL DAILY
Status: DISCONTINUED | OUTPATIENT
Start: 2020-09-24 | End: 2020-09-28 | Stop reason: HOSPADM

## 2020-09-24 RX ORDER — ACETAMINOPHEN 650 MG/1
650 SUPPOSITORY RECTAL EVERY 6 HOURS PRN
Status: DISCONTINUED | OUTPATIENT
Start: 2020-09-24 | End: 2020-09-28 | Stop reason: HOSPADM

## 2020-09-24 RX ORDER — PROMETHAZINE HYDROCHLORIDE 25 MG/1
12.5 TABLET ORAL EVERY 6 HOURS PRN
Status: DISCONTINUED | OUTPATIENT
Start: 2020-09-24 | End: 2020-09-28

## 2020-09-24 RX ORDER — FAMOTIDINE 20 MG/1
20 TABLET, FILM COATED ORAL 2 TIMES DAILY
Status: DISCONTINUED | OUTPATIENT
Start: 2020-09-24 | End: 2020-09-28 | Stop reason: HOSPADM

## 2020-09-24 RX ORDER — TIZANIDINE 4 MG/1
4 TABLET ORAL 3 TIMES DAILY PRN
Status: ON HOLD | COMMUNITY
End: 2020-09-28 | Stop reason: HOSPADM

## 2020-09-24 RX ORDER — ATENOLOL 25 MG/1
25 TABLET ORAL DAILY
Status: DISCONTINUED | OUTPATIENT
Start: 2020-09-24 | End: 2020-09-28 | Stop reason: HOSPADM

## 2020-09-24 RX ORDER — PANTOPRAZOLE SODIUM 40 MG/1
40 TABLET, DELAYED RELEASE ORAL
Status: DISCONTINUED | OUTPATIENT
Start: 2020-09-25 | End: 2020-09-28 | Stop reason: HOSPADM

## 2020-09-24 RX ORDER — POLYETHYLENE GLYCOL 3350 17 G/17G
17 POWDER, FOR SOLUTION ORAL DAILY PRN
Status: DISCONTINUED | OUTPATIENT
Start: 2020-09-24 | End: 2020-09-28 | Stop reason: HOSPADM

## 2020-09-24 RX ADMIN — ATENOLOL 25 MG: 25 TABLET ORAL at 14:06

## 2020-09-24 RX ADMIN — GABAPENTIN 800 MG: 400 CAPSULE ORAL at 14:06

## 2020-09-24 RX ADMIN — DICYCLOMINE HYDROCHLORIDE 10 MG: 10 CAPSULE ORAL at 21:25

## 2020-09-24 RX ADMIN — Medication 5000 UNITS: at 16:29

## 2020-09-24 RX ADMIN — ONDANSETRON 4 MG: 2 INJECTION INTRAMUSCULAR; INTRAVENOUS at 18:14

## 2020-09-24 RX ADMIN — PAROXETINE HYDROCHLORIDE 50 MG: 20 TABLET, FILM COATED ORAL at 21:24

## 2020-09-24 RX ADMIN — MULTIPLE VITAMINS W/ MINERALS TAB 1 TABLET: TAB at 14:06

## 2020-09-24 RX ADMIN — HYDROMORPHONE HYDROCHLORIDE 1 MG: 1 INJECTION, SOLUTION INTRAMUSCULAR; INTRAVENOUS; SUBCUTANEOUS at 22:49

## 2020-09-24 RX ADMIN — ONDANSETRON 4 MG: 2 INJECTION INTRAMUSCULAR; INTRAVENOUS at 11:58

## 2020-09-24 RX ADMIN — LEVETIRACETAM 1000 MG: 500 TABLET, FILM COATED ORAL at 21:25

## 2020-09-24 RX ADMIN — SUCRALFATE 1 G: 1 TABLET ORAL at 21:24

## 2020-09-24 RX ADMIN — SODIUM CHLORIDE, PRESERVATIVE FREE 10 ML: 5 INJECTION INTRAVENOUS at 21:27

## 2020-09-24 RX ADMIN — LORAZEPAM 1 MG: 1 TABLET ORAL at 14:06

## 2020-09-24 RX ADMIN — DIATRIZOATE MEGLUMINE AND DIATRIZOATE SODIUM 120 ML: 600; 100 SOLUTION ORAL; RECTAL at 11:08

## 2020-09-24 RX ADMIN — LEVOTHYROXINE SODIUM 125 MCG: 25 TABLET ORAL at 14:06

## 2020-09-24 RX ADMIN — FAMOTIDINE 20 MG: 20 TABLET ORAL at 21:25

## 2020-09-24 RX ADMIN — SODIUM CHLORIDE: 9 INJECTION, SOLUTION INTRAVENOUS at 13:48

## 2020-09-24 RX ADMIN — GABAPENTIN 800 MG: 400 CAPSULE ORAL at 21:24

## 2020-09-24 RX ADMIN — OXYCODONE HYDROCHLORIDE AND ACETAMINOPHEN 1 TABLET: 5; 325 TABLET ORAL at 17:09

## 2020-09-24 RX ADMIN — ENOXAPARIN SODIUM 40 MG: 40 INJECTION SUBCUTANEOUS at 14:06

## 2020-09-24 RX ADMIN — FAMOTIDINE 20 MG: 20 TABLET ORAL at 14:05

## 2020-09-24 RX ADMIN — LEVETIRACETAM 1000 MG: 500 TABLET, FILM COATED ORAL at 14:06

## 2020-09-24 RX ADMIN — ESTRADIOL 0.5 MG: 1 TABLET ORAL at 16:29

## 2020-09-24 RX ADMIN — SODIUM CHLORIDE, PRESERVATIVE FREE 10 ML: 5 INJECTION INTRAVENOUS at 13:16

## 2020-09-24 RX ADMIN — HYDROMORPHONE HYDROCHLORIDE 1 MG: 1 INJECTION, SOLUTION INTRAMUSCULAR; INTRAVENOUS; SUBCUTANEOUS at 13:16

## 2020-09-24 RX ADMIN — HYDROMORPHONE HYDROCHLORIDE 1 MG: 1 INJECTION, SOLUTION INTRAMUSCULAR; INTRAVENOUS; SUBCUTANEOUS at 18:14

## 2020-09-24 RX ADMIN — PROMETHAZINE HYDROCHLORIDE 25 MG: 25 INJECTION INTRAMUSCULAR; INTRAVENOUS at 07:55

## 2020-09-24 ASSESSMENT — PAIN DESCRIPTION - LOCATION
LOCATION: ABDOMEN

## 2020-09-24 ASSESSMENT — PAIN DESCRIPTION - PROGRESSION
CLINICAL_PROGRESSION: NOT CHANGED

## 2020-09-24 ASSESSMENT — PAIN SCALES - GENERAL
PAINLEVEL_OUTOF10: 9
PAINLEVEL_OUTOF10: 9
PAINLEVEL_OUTOF10: 8
PAINLEVEL_OUTOF10: 8
PAINLEVEL_OUTOF10: 6
PAINLEVEL_OUTOF10: 6
PAINLEVEL_OUTOF10: 7
PAINLEVEL_OUTOF10: 9
PAINLEVEL_OUTOF10: 10

## 2020-09-24 ASSESSMENT — PAIN DESCRIPTION - DIRECTION
RADIATING_TOWARDS: FLANK
RADIATING_TOWARDS: FLANK
RADIATING_TOWARDS: RT FLANK
RADIATING_TOWARDS: RT FLANK

## 2020-09-24 ASSESSMENT — PAIN DESCRIPTION - PAIN TYPE
TYPE: CHRONIC PAIN;ACUTE PAIN
TYPE: CHRONIC PAIN
TYPE: ACUTE PAIN
TYPE: ACUTE PAIN;CHRONIC PAIN
TYPE: CHRONIC PAIN

## 2020-09-24 ASSESSMENT — PAIN DESCRIPTION - ORIENTATION
ORIENTATION: RIGHT
ORIENTATION: MID
ORIENTATION: RIGHT
ORIENTATION: MID

## 2020-09-24 ASSESSMENT — PAIN - FUNCTIONAL ASSESSMENT
PAIN_FUNCTIONAL_ASSESSMENT: ACTIVITIES ARE NOT PREVENTED

## 2020-09-24 ASSESSMENT — PAIN DESCRIPTION - FREQUENCY
FREQUENCY: CONTINUOUS

## 2020-09-24 ASSESSMENT — PAIN DESCRIPTION - DESCRIPTORS
DESCRIPTORS: SPASM;SORE;SHOOTING
DESCRIPTORS: STABBING;SQUEEZING
DESCRIPTORS: CRAMPING;DISCOMFORT;SHOOTING

## 2020-09-24 ASSESSMENT — PAIN DESCRIPTION - ONSET
ONSET: ON-GOING
ONSET: ON-GOING

## 2020-09-24 NOTE — H&P
HISTORY AND PHYSICAL    2020     Patient Information:    Patient: Paulette Beasley     Gender: female  : 1970   Age: 44 y.o. MRN: [id number]        PCP:  Cristina Christensen MD (Tel: [phone] )    Chief complaint:    Chief Complaint   Patient presents with    Abdominal Pain    Flank Pain     right        History of Present Illness:  Paulette Beasley is a 44 y.o. female with morbid obesity , Anxiety , h/o Upper GI bleed few years ago , s/p gastric sleeve on 2019 with mild gastritis , multiple  EGD  History of pancreatitis , Pt has chronic RUQ abdominal pain associated with nausea, vomiting and she has been admitted few times with same symptoms . Pt was seen recently on [facility]   SBFT done with no active finding . History obtained from patient . REVIEW OF SYSTEMS:   Constitutional: Negative for fever,chills . ENT: Negative for rhinorrhea,  sore throat. Respiratory: Negative for cough, shortness of breath,wheezing  Cardiovascular: Negative for chest pain, palpitations   Gastrointestinal: + for Abdominal pain, nausea, vomiting, diarrhea  Genitourinary: Negative for polyuria, dysuria   Hematologic/Lymphatic: Negative for bleeding tendency, easy bruising  Musculoskeletal: Negative for myalgias and arthralgias  Neurologic: Negative for confusion,dysarthria. Skin: Negative for itching,rash  Psychiatric: Negative for depression,anxiety, agitation. Endocrine: Negative for polydipsia,polyuria,heat /cold intolerance.     Past Medical History:   has a past medical history of Acid reflux, Anemia, Anxiety, Arthritis, Bleeding ulcer, Bronchitis, Chronic back pain, Chronic pain, COPD (chronic obstructive pulmonary disease) ([facility] [address] Depression, Fibromyalgia, H/O echocardiogram, H/O echocardiogram, Hiatal hernia, History of blood transfusion, Mcgrath (hard of hearing), Hx of cardiac catheterization, Hx of transesophageal echocardiography (PILO) for monitoring, HX OTHER MEDICAL, HX OTHER MEDICAL, Hypertension, Kidney cysts, Lupus ([facility] [address] MDRO (multiple drug resistant organisms) resistance, Morbid obesity ([facility] [address] MRSA bacteremia, Nausea, Pain management, Pancreatitis chronic, Pneumonia, Shortness of breath on exertion, Shortness of breath on exertion, Sleep apnea, Staph infection, Staph infection, Teeth missing, Thyroid disease, and Wears glasses. Past Surgical History:   has a past surgical history that includes Lung removal, partial (Left, );  section (1991); Foot surgery (Left, Last Done In ); Dental surgery; Cholecystectomy, laparoscopic (8352'[id number]); other surgical history (1998); other surgical history (Last Done 7-15-16); Tonsillectomy and adenoidectomy (); Appendectomy (1998); Upper gastrointestinal endoscopy (2018); Lap Band (~); Hysterectomy (10/1997); Endoscopy, colon, diagnostic (Last Done In ); Colonoscopy (Last Done In ); Cardiac catheterization (10/24/2010); Sleeve Gastrectomy (N/A, 2019); hiatal hernia repair (N/A, 2019); Upper gastrointestinal endoscopy (N/A, 2019); Foot Debridement (Left, 2019); Upper gastrointestinal endoscopy (N/A, 2019); Catheter Removal (N/A, 2019); Upper gastrointestinal endoscopy (N/A, 2019); INSERTION / REMOVAL / REPLACEMENT VENOUS ACCESS CATHETER (N/A, 8/15/2019); Upper gastrointestinal endoscopy (N/A, 10/14/2019); [address] Surgery (N/A, 1/15/2020); Port Surgery (N/A, 2020); and Upper gastrointestinal endoscopy (N/A, 2020). Medications:  No current facility-administered medications on file prior to encounter.       Current Outpatient Medications on File Prior to Encounter   Medication Sig Dispense Refill    sucralfate (CARAFATE) 1 GM tablet Take 1 tablet by mouth every 12 hours 120 tablet 3    albuterol sulfate  (90 Base) MCG/ACT inhaler       ondansetron (ZOFRAN ODT) 4 MG disintegrating tablet Take 1 tablet by mouth every 8 hours as needed for Nausea or Vomiting 30 tablet 5    levETIRAcetam (KEPPRA) 1000 MG tablet Take 1 tablet by mouth 2 times daily 60 tablet 5    LORazepam (ATIVAN) 1 MG tablet Take 1 mg by mouth as needed for Anxiety.  pantoprazole (PROTONIX) 40 MG tablet Take 1 tablet by mouth every morning (before breakfast) 90 tablet 1    dicyclomine (BENTYL) 10 MG capsule Take 1 capsule by mouth 3 times daily as needed (abdominal pain) 21 capsule 3    oxyCODONE-acetaminophen (PERCOCET) 5-325 MG per tablet Take 1 tablet by mouth. Every 4-6 hours PRN      Cholecalciferol (VITAMIN D3) 5000 units TABS Take 1 tablet by mouth daily      estradiol (ESTRACE) 0.5 MG tablet Take 0.5 mg by mouth daily      atenolol (TENORMIN) 25 MG tablet Take 25 mg by mouth daily      Multiple Vitamin (MVI, BARIATRIC ADVANTAGE MULTI-FORMULA, CHEW TAB) Take 1 tablet by mouth daily 30 tablet 5    Calcium Citrate-Vitamin D (CALCIUM + VIT D, BARIATRIC ADVANTAGE, CHEWABLE TABLET) Take 1 tablet by mouth 2 times daily 120 tablet 5    levothyroxine (SYNTHROID) 125 MCG tablet Take 1 tablet by mouth Daily (Patient taking differently: Take 125 mcg by mouth nightly ) 30 tablet 0    gabapentin (NEURONTIN) 800 MG tablet Take 800 mg by mouth 3 times daily      PARoxetine (PAXIL) 30 MG tablet Take 50 mg by mouth nightly          Allergies: Allergies   Allergen Reactions    Aspirin Palpitations     \"My Heart Rate Elevates\"    Shellfish-Derived Products Swelling    Toradol [Ketorolac Tromethamine] Rash    Compazine [Prochlorperazine]     Reglan [Metoclopramide] Itching        Social History:   reports that she has never smoked. She has never used smokeless tobacco. She reports that she does not drink alcohol or use drugs. Family History:  family history includes Arthritis in her mother; Asthma in her father and son;  Cancer in her father; Diabetes in her father and mother; Heart Disease in her mother; High Blood Pressure in her brother, father, and mother; High Cholesterol in her mother; Lupus in her daughter; Obesity in her mother; Other in her daughter; Vision Loss in her mother and son. ,     Physical Exam:  /69   Pulse 57   Temp 97.6 °F (36.4 °C)   Resp 18   Ht 5' 4\" (1.626 m)   Wt 265 lb (120.2 kg)   SpO2 100%   BMI 45.49 kg/m²     General appearance:  no distress . Well nourished  Eyes: Sclera clear, pupils equal  Cardiovascular: Regular rhythm, normal S1, S2. No edema in lower extremities  Respiratory: Clear to auscultation bilaterally, no wheeze, good inspiratory effort  Gastrointestinal: Abdomen soft, non-tender, not distended, normal bowel sounds  Musculoskeletal: No cyanosis in digits, neck supple  Neurology: Cranial nerves grossly intact. Alert and oriented . No speech or motor deficits  Psychiatry: Appropriate affect.  Not agitated  Skin: Warm, dry, normal turgor, no rash    Labs:  CBC:   Lab Results   Component Value Date    WBC 5.4 09/24/2020    RBC 4.23 09/24/2020    HGB 11.2 09/24/2020    HCT 36.5 09/24/2020    MCV 86.3 09/24/2020    MCH 26.5 09/24/2020    MCHC 30.7 09/24/2020    RDW 13.3 09/24/2020     09/24/2020    MPV 10.2 09/24/2020     BMP:    Lab Results   Component Value Date     09/24/2020    K 4.4 09/24/2020     09/24/2020    CO2 21 09/24/2020    BUN 25 09/24/2020    CREATININE 0.9 09/24/2020    CALCIUM 10.2 09/24/2020    GFRAA >60 09/24/2020    LABGLOM >60 09/24/2020    GLUCOSE 94 09/24/2020       Chest Xray:   EKG:    I visualized CXR images and EKG strips      Patient Active Problem List   Diagnosis Code    Fibromyalgia M79.7    Lupus (systemic lupus erythematosus) (HCC) M32.9    Chronic pancreatitis (HCC) K86.1    HTN (hypertension) I10    Generalized abdominal pain R10.84    Frequent UTI N39.0    Gastroesophageal reflux disease without esophagitis K21.9    Depression F32.9    Fatty liver disease, nonalcoholic M82.8    Arthritis M19.90    Bilateral low back pain with left-sided sciatica M54.42    Intractable vomiting with nausea R11.2    Hiatal hernia K44.9    Status post laparoscopic sleeve gastrectomy Z98.84    Pseudoseizure F44.5    Chronic pain syndrome G89.4    Drug-seeking/Aberrant behavior Z76.5    Lymph node disorder I89.9    Morbid obesity with BMI of 40.0-44.9, adult (HCC) E66.01, Z68.41    Iron deficiency anemia secondary to blood loss (chronic) D50.0    Encounter for weight management Z76.89    Intractable nausea and vomiting R11.2    History of Jet-en-Y gastric bypass Z98.84    Seizure (HCC) R56.9    Abdominal pain R10.9    Anxiety F41.9         Active Hospital Problems    Diagnosis    Abdominal pain [R10.9]     Priority: Medium       h/o pseudoseizure   S/p gastric sleep 2019  Morbid obesity   Anxiety   Chronic abdominal pain   H/o chronic pancreatitis   Questionable hematemesis            Assessment/Plan:   Tele   IVF   Cont Analia ,   surg consult    monitor H/H , Advance clear liquid diet as tolerated     Home meds , reviewed and resumed as appropriate   Symptoms releif/Pain control  DVT proph      Assessment/Plan:   Tele   IVF  surg consult   Home meds , reviewed and resumed as appropriate   Symptoms releif/Pain control  DVT proph           Darline Cotto MD    9/24/2020 11:22 AM

## 2020-09-24 NOTE — CARE COORDINATION
This Patient is being followed by Case Management d/t frequent ED visits and admissions. Pt here today again for chronic abdominal pain/right flank pain. She was seen yesterday by Dr. Tricia Glez via audio/video evaluation for 'Pain in my side'. She was to return in about 1 week (around 9/30/2020) for imaging and tests results review. Pt had gastric sleeve on 2/12/2019 as well as pseudoseizure disorder. Pt has recent admission to [facility] (9-6) at which time GI was consulted and no definitive diagnosis was found. Pt was laying in bed not ambulating during admission @ [facility] and was encouraged ambulation 3x daily with staff. Pt also was educated on ambulating outside, keeping a journal, and speaking with family for motivation d/t dx of anxiety/depression for which she takes ativan and paxil. While @ [facility] the following was noted:  Seeking Behavior  - Daughter notified staff patient will get agitated in hospital when IV pain medication stopped  - also continues to seek food/snacks inappropriate for her to stimulate symptoms of N/V. Denies this behavior when confronted  - has been obtaining snacks from vending machine overnight then will endorse N/V and pain requesting IV pain medication    Pt is currently awaiting upper GI study which is ordered by her general surgeon, Dr. Tricia Glez. Pt also follows with pain management, currently on Percocet. 36  LSW spoke to pt and explained CM role. Pt remembers LSW from previous ED visit. Pt notes her pain was too much so she came to ED this am. Pt reports she believes she may have thrown up her percocet as well and took a few extra and wanted to be sure she did not have too much in her system. Pt notes she is scheduled for upper GI tomorrow but again, just could not wait. LSW talked w/pt about all the tests continue to not indicate any significant findings. Pt still feels something is going on and Dr. Tricia Glez feels she may needs another surgery/proceudre to address her issues.  Pt notes she believes she may have the upper GI today. LSW notes likely she will be d/c'd if no findings are noted. LSW did update ACP w/pt as she had her mother listed as secondary decision maker but she has 2 children. Pt now wants to complete POA which LSW assisted with- naming her Dtr as Agent and her son as 1st alternative. LSW did talk to pt about her anxiety and she notes it has progressed. Although she has ativan prn prescribed by Dr. Yariel Cam, she notes she does not take it often and tries to do deep breathing or get outside. LSW talked about resources for anxiety including 10 quick and effective methods as well as support groups for which pt is amenable to. LSW provided these to pt. LSW then spoke to [facility] and reviewed case w/him. He did confirm he spoke to Dr. Kim Drake who wants pt to have upper GI done today while here in ED and if no findings, pt to be d/c'd home. Mallika Tenorio is not going to dispense narcotics to pt @ this time which LSW strongly agrees with. 26  LSW spoke to pt as she returned from upper GI. John-RN also in room. Pt notes although GI was itself was clear per Dr. Kim Drake, she is going to be admitted for ERCP and needs stents put in. She also noted she would like to complete LW and LSW did so. Mallika Tenorio did speak to Dr. Kim Drake who recommends admit pt and he will consult GI for ERCP. Will consult Dr Nina Covarrubias, and seek his opinion re ERCP. No anticipated needs @ d/c.

## 2020-09-24 NOTE — PROGRESS NOTES
Pt is in room 1118 from ED. Pt is extremely nauseous and vomited recently in the ED. She is refusing any PO medication. She has serve c/o pain to her ABD and requesting IV analgesics. Perfect Serve sent to Dr. Felix Isaac. Two person skin check done with Mariella MEJIA. No skin issues to note. Pt has a L Upper chest port accessed.

## 2020-09-24 NOTE — CARE COORDINATION
Reviewed chart for discharge planning. Pt was just seen in ED today and attempted to discharge home with no needs however pt admitted for ERCP/ stent plcmt tomorrow. At this time pt presents with no discharge planning needs, lives with mother, remains ind with ADLs, has pcp/ ins.  CM available if needs arise.

## 2020-09-24 NOTE — ED PROVIDER NOTES
Diagnosis Date    Acid reflux     Anemia     Anxiety     Arthritis     Hands, Back And Ankles    Bleeding ulcer 2014    \"I Had Ulcers In My Stomach And Colon\"/ per pt on 8/12/2019\"they said recently having some blood in my stomach- in July ( 2019)could not find where coming from \"    Bronchitis Last Episode 2014    Chronic back pain     Chronic pain     Sees Dr. Lauren Carrington COPD (chronic obstructive pulmonary disease) ([facility] [address]     Sees Dr. Korey Salcedo Depression     Fibromyalgia Dx 2013    H/O echocardiogram 8/11/15    EF >55%. LA to be at the upper limit of normal in size. LV hypertrophy with normal LV systolic, but abnormal diastolic function. Normal valvular structures and function.  H/O echocardiogram 08/30/2018    EF 55-60%    Hiatal hernia     History of blood transfusion 09/2015 And 2018    No Reaction To Blood Transfusions Received    [place] (hard of hearing)     Right Ear    Hx of cardiac catheterization 10/24/2010    Angiographically normal coronary arteries w/ normal LV function and wall motion.  Hx of transesophageal echocardiography (PILO) for monitoring 12/2/2010    EF 55-60%. WNL.    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 name    Kidney cysts     \"they found that I have a kidney cyst but not sure which side\" per pt on 7/15/2020    Lupus ([facility] [address] Dx 2013    \"Rheumatoid Lupus\"    MDRO (multiple drug resistant organisms) resistance     4 months ago chest from mediport    Morbid obesity ([address]     MRSA bacteremia     Nausea     \"Most Of The Time\"    Pain management     Sees Dr. Piotr Gonsales, Once A Month    Pancreatitis chronic Dx 2001    Pneumonia Dx 11-15    Shortness of breath on exertion     Shortness of breath on exertion     Sleep apnea     Has CPAP\"no longer use the cpap since lost weight\"    Staph infection Dx 11-15    Left Foot    Staph infection Dx 11-15    \"Left Foot\"    Teeth missing     Upper And Lower    Thyroid disease     Wears glasses     To Read     Past Surgical History:   Procedure Laterality Date    APPENDECTOMY  02/1998    Done When Tubes And Ovaries Were Removed    CARDIAC CATHETERIZATION  10/24/2010    CATHETER REMOVAL N/A 7/16/2019    PORT REMOVAL performed by Josh [facility] MD at [facility]  08/27/1991    CHOLECYSTECTOMY, LAPAROSCOPIC  1990's    COLONOSCOPY  Last Done In 2000's    DENTAL SURGERY      Teeth Extracted In Past    ENDOSCOPY, COLON, DIAGNOSTIC  Last Done In 2018    FOOT DEBRIDEMENT Left 6/16/2019    FIRST METATARSAL DEBRIDEMENT INCISION AND DRAINAGE. EXCISION OF ULCER.  RESECTION OF BONE. 1ST METATARSAL POWER LAVAGE AND BONE CEMENT performed by Vikki Romero DPM at [facility] [address] Left Last Done In 2016     Dr. Jose Wu, \" About 6 Surgeries Done Because Of Staph Infection\"    HIATAL HERNIA REPAIR N/A 2/12/2019    HERNIA HIATAL LAPAROSCOPIC ROBOTIC performed by Josh Baldwin MD at [address] [place] [address]  10/1997    Partial Abdominal Hysterectomy    INSERTION / REMOVAL / REPLACEMENT VENOUS ACCESS CATHETER N/A 8/15/2019    PORT INSERTION performed by Josh Baldwin MD at [address] [place] [address]    removed after 6 months    LUNG REMOVAL, PARTIAL Left 2008    Benign    OTHER SURGICAL HISTORY  02/1998    \"Tubes And Ovaries Removed, Appendectomy Also Done\"    OTHER SURGICAL HISTORY  Last Done 7-15-16    Mediport Insertion \"Total Of Six Done, Removed Last Mediport In 2014\"    PORT SURGERY N/A 1/15/2020    PORT REMOVAL performed by Josh Baldwin MD at [address] [place] [address] N/A 7/6/2020    PORT INSERTION performed by Josh Baldwin MD at [address] [place] [address] N/A 2/12/2019 GASTRECTOMY SLEEVE LAPAROSCOPIC ROBOTIC performed by Chris Garner MD at [address] ENDOSCOPY  08/27/2018    UPPER GASTROINTESTINAL ENDOSCOPY N/A 4/2/2019    EGD CONTROL HEMORRHAGE with epi injection at bleeding site performed by Chris Garner MD at [facility] 6/19/2019    EGD DIAGNOSTIC ONLY performed by Chris Garner MD at [place] [address] 7/22/2019    EGD DIAGNOSTIC ONLY performed by Lyle Rowe MD at [place] [address] 10/14/2019    EGD DIAGNOSTIC ONLY performed by Chris Garner MD at [address] [place] [address] N/A 7/17/2020    EGJ DILATATION BALLOON WITH 18-20 MM BALLOON, DILATED TO 20 MM. performed by Chris Garner MD at [address] [place] [address] Outpatient Rx   Medication Sig Dispense Refill    sucralfate (CARAFATE) 1 GM tablet Take 1 tablet by mouth every 12 hours 120 tablet 3    albuterol sulfate  (90 Base) MCG/ACT inhaler       ondansetron (ZOFRAN ODT) 4 MG disintegrating tablet Take 1 tablet by mouth every 8 hours as needed for Nausea or Vomiting 30 tablet 5    levETIRAcetam (KEPPRA) 1000 MG tablet Take 1 tablet by mouth 2 times daily 60 tablet 5    LORazepam (ATIVAN) 1 MG tablet Take 1 mg by mouth as needed for Anxiety.  pantoprazole (PROTONIX) 40 MG tablet Take 1 tablet by mouth every morning (before breakfast) 90 tablet 1    dicyclomine (BENTYL) 10 MG capsule Take 1 capsule by mouth 3 times daily as needed (abdominal pain) 21 capsule 3    oxyCODONE-acetaminophen (PERCOCET) 5-325 MG per tablet Take 1 tablet by mouth.  Every 4-6 hours PRN      Cholecalciferol (VITAMIN D3) 5000 units TABS Take 1 tablet by mouth daily      estradiol (ESTRACE) 0.5 MG tablet Take 0.5 mg by mouth daily      atenolol (TENORMIN) 25 MG tablet Take 25 mg by mouth daily      Multiple Vitamin (MVI, BARIATRIC ADVANTAGE MULTI-FORMULA, CHEW TAB) Take 1 tablet by mouth daily 30 tablet 5    Calcium Citrate-Vitamin D (CALCIUM + VIT D, BARIATRIC ADVANTAGE, CHEWABLE TABLET) Take 1 tablet by mouth 2 times daily 120 tablet 5    levothyroxine (SYNTHROID) 125 MCG tablet Take 1 tablet by mouth Daily (Patient taking differently: Take 125 mcg by mouth nightly ) 30 tablet 0    gabapentin (NEURONTIN) 800 MG tablet Take 800 mg by mouth 3 times daily      PARoxetine (PAXIL) 30 MG tablet Take 50 mg by mouth nightly          ALLERGIES    Allergies   Allergen Reactions    Aspirin Palpitations     \"My Heart Rate Elevates\"    Shellfish-Derived Products Swelling    Toradol [Ketorolac Tromethamine] Rash    Compazine [Prochlorperazine]     Reglan [Metoclopramide] Itching       SOCIAL AND FAMILY HISTORY    Social History     Socioeconomic History    Marital status:      Spouse name: None    Number of children: None    Years of education: None    Highest education level: None   Occupational History    None   Social Needs    Financial resource strain: None    Food insecurity     Worry: None     Inability: None    Transportation needs     Medical: None     Non-medical: None   Tobacco Use    Smoking status: Never Smoker    Smokeless tobacco: Never Used   Substance and Sexual Activity    Alcohol use: No     Alcohol/week: 0.0 standard drinks    Drug use: No    Sexual activity: Not Currently   Lifestyle    Physical activity     Days per week: None     Minutes per session: None    Stress: None   Relationships    Social connections     Talks on phone: None     Gets together: None     Attends Holiness service: None     Active member of club or organization: None     Attends meetings of clubs or organizations: None     Relationship status: None    Intimate partner violence     Fear of current or ex partner: None     Emotionally abused: None     Physically abused: None Result Value Ref Range    WBC 5.4 4.0 - 10.5 K/CU MM    RBC 4.23 4.2 - 5.4 M/CU MM    Hemoglobin 11.2 (L) 12.5 - 16.0 GM/DL    Hematocrit 36.5 (L) 37 - 47 %    MCV 86.3 78 - 100 FL    MCH 26.5 (L) 27 - 31 PG    MCHC 30.7 (L) 32.0 - 36.0 %    RDW 13.3 11.7 - 14.9 %    Platelets 704 960 - 584 K/CU MM    MPV 10.2 7.5 - 11.1 FL    Differential Type AUTOMATED DIFFERENTIAL     Segs Relative 44.1 36 - 66 %    Lymphocytes % 40.7 24 - 44 %    Monocytes % 9.2 (H) 0 - 4 %    Eosinophils % 5.3 (H) 0 - 3 %    Basophils % 0.7 0 - 1 %    Segs Absolute 2.4 K/CU MM    Lymphocytes Absolute 2.2 K/CU MM    Monocytes Absolute 0.5 K/CU MM    Eosinophils Absolute 0.3 K/CU MM    Basophils Absolute 0.0 K/CU MM    Nucleated RBC % 0.0 %    Total Nucleated RBC 0.0 K/CU MM    Total Immature Neutrophil 0.00 K/CU MM    Immature Neutrophil % 0.0 0 - 0.43 %   Comprehensive Metabolic Panel   Result Value Ref Range    Sodium 140 135 - 145 MMOL/L    Potassium 4.4 3.5 - 5.1 MMOL/L    Chloride 107 99 - 110 mMol/L    CO2 21 21 - 32 MMOL/L    BUN 25 (H) 6 - 23 MG/DL    CREATININE 0.9 0.6 - 1.1 MG/DL    Glucose 94 70 - 99 MG/DL    Calcium 10.2 8.3 - 10.6 MG/DL    Alb 4.2 3.4 - 5.0 GM/DL    Total Protein 6.3 (L) 6.4 - 8.2 GM/DL    Total Bilirubin 0.1 0.0 - 1.0 MG/DL    ALT 20 10 - 40 U/L    AST 26 15 - 37 IU/L    Alkaline Phosphatase 100 40 - 129 IU/L    GFR Non-African American >60 >60 mL/min/1.73m2    GFR African American >60 >60 mL/min/1.73m2    Anion Gap 12 4 - 16   HCG Qualitative, Serum   Result Value Ref Range    hCG Qual INDETERMINATE    Urinalysis   Result Value Ref Range    Color, UA YELLOW YELLOW    Clarity, UA SLIGHTLY CLOUDY (A) CLEAR    Glucose, Urine NEGATIVE NEGATIVE MG/DL    Bilirubin Urine SMALL (A) NEGATIVE MG/DL    Ketones, Urine NEGATIVE NEGATIVE MG/DL    Specific Gravity, UA 1.028 1.001 - 1.035    Blood, Urine NEGATIVE NEGATIVE    pH, Urine 5.0 5.0 - 8.0    Protein, UA 30 (A) NEGATIVE MG/DL    Urobilinogen, Urine NORMAL 0.2 - 1.0 MG/DL    Nitrite Urine, Quantitative NEGATIVE NEGATIVE    Leukocyte Esterase, Urine NEGATIVE NEGATIVE    RBC, UA 1 0 - 6 /HPF    WBC, UA 2 0 - 5 /HPF    Bacteria, UA FEW (A) NEGATIVE /HPF    Squam Epithel, UA 17 /HPF    Mucus, UA OCCASIONAL (A) NEGATIVE HPF    Trichomonas, UA NONE SEEN NONE SEEN /HPF    Hyaline Casts, UA 10 /LPF   Lipase   Result Value Ref Range    Lipase 20 13 - 60 IU/L   ICTOTEST, URINE   Result Value Ref Range    Ictotest NEGATIVE            RADIOLOGY/PROCEDURES    FL UGI   Final Result   Postsurgical changes from sleeve gastrectomy without evidence of acute   complication. No stricture or leak. ED COURSE & MEDICAL DECISION MAKING        Exact cause of patient's ongoing abdominal pain unknown. Patient is clinically not toxic appearing, afebrile today and vital signs stable. I discussed patient case with patient's general surgeon, Dr. Derick Ross, who saw patient at bedside in the ED. He recommends admit to hospitalist service and he will consult GI for ERCP. In consideration of current COVID19 pandemic, with effort to minimize unnecessary provider exposure, this patient was seen at bedside by me independently. However, in compliance with current hospital KEN/ED protocol, prior to admission I did discuss this patient case with emergency department physician, Dr. Bladimir North. I discussed patient case with patient's PCP, Dr. Thong Alvares. He agrees to admit patient. Clinical  IMPRESSION    1. Abdominal pain, unspecified abdominal location    2. Nausea              Comment: Please note this report has been produced using speech recognition software and may contain errors related to that system including errors in grammar, punctuation, and spelling, as well as words and phrases that may be inappropriate. If there are any questions or concerns please feel free to contact the dictating provider for clarification.        Erwin Robersonma  09/24/20 [id number]

## 2020-09-24 NOTE — ED TRIAGE NOTES
C/o of abd pain that radiates to her right side x 2 weeks,increased pain today,last percocet taken around 5 am

## 2020-09-24 NOTE — CONSULTS
SURGICAL CONSULTATION    Date of Admission:  9/24/2020  Date of Consultation:  9/24/2020    PCP:  Jarrod Haddad MD  Thank you  very much for your consultation, it's always appreciated. I had the privilege today to see your patient Sirena Olsen. Chief Complaint / History of Present Illness:  Sirena Olsen is a very pleasant 44 y.o. female who presents with pain in the Right Upper Quadrant and is chronic, worsening, dull and stabbing compared to patient's normal condition. It is moderate in intensity for a duration of 3 months and is intermittent with modifying factors increased by or worse with eating anything. .    She has a h/o sleeve gastrectomy, and an upper GI study that I ordered, was NEGATIVE for any gastric stricture, no abnormality in the stomach, needs GI consult for ? ERCP, she had MULTIPLE ERCPs before and stents helped her similar pains in the past.. ? Will consult Dr Micah Bermudez, and seek his opinion re ERCP. PMH:   has a past medical history of Acid reflux, Anemia, Anxiety, Arthritis, Bleeding ulcer (2014), Bronchitis (Last Episode 2014), Chronic back pain, Chronic pain, COPD (chronic obstructive pulmonary disease) ([address] Depression, Fibromyalgia (Dx 2013), H/O echocardiogram (8/11/15), H/O echocardiogram (08/30/2018), Hiatal hernia, History of blood transfusion (09/2015 And 2018), [place] (hard of hearing), cardiac catheterization (10/24/2010), transesophageal echocardiography (PILO) for monitoring (12/2/2010), OTHER MEDICAL, OTHER MEDICAL, Hypertension, Kidney cysts, Lupus ([address] (Dx 2013), MDRO (multiple drug resistant organisms) resistance, Morbid obesity ([address] MRSA bacteremia, Nausea, Pain management, Pancreatitis chronic (Dx 2001), Pneumonia (Dx 11-15), Shortness of breath on exertion, Shortness of breath on exertion, Sleep apnea, Staph infection (Dx 11-15), Staph infection (Dx 11-15), Teeth missing, Thyroid disease, and Wears glasses.     PSH:   has a past surgical history that includes Lung removal, partial (Left, );  section (1991); Foot surgery (Left, Last Done In ); Dental surgery; Cholecystectomy, laparoscopic ('); other surgical history (1998); other surgical history (Last Done 7-15-16); Tonsillectomy and adenoidectomy (); Appendectomy (1998); Upper gastrointestinal endoscopy (2018); Lap Band (~); Hysterectomy (10/1997); Endoscopy, colon, diagnostic (Last Done In ); Colonoscopy (Last Done In ); Cardiac catheterization (10/24/2010); Sleeve Gastrectomy (N/A, 2019); hiatal hernia repair (N/A, 2019); Upper gastrointestinal endoscopy (N/A, 2019); Foot Debridement (Left, 2019); Upper gastrointestinal endoscopy (N/A, 2019); Catheter Removal (N/A, 2019); Upper gastrointestinal endoscopy (N/A, 2019); INSERTION / REMOVAL / REPLACEMENT VENOUS ACCESS CATHETER (N/A, 8/15/2019); Upper gastrointestinal endoscopy (N/A, 10/14/2019); [address] Surgery (N/A, 1/15/2020); Port Surgery (N/A, 2020); and Upper gastrointestinal endoscopy (N/A, 2020). Allergies: Allergies   Allergen Reactions    Aspirin Palpitations     \"My Heart Rate Elevates\"    Shellfish-Derived Products Swelling    Toradol [Ketorolac Tromethamine] Rash    Compazine [Prochlorperazine]     Reglan [Metoclopramide] Itching        Home Meds:    Prior to Admission medications    Medication Sig Start Date End Date Taking?  Authorizing Provider   sucralfate (CARAFATE) 1 GM tablet Take 1 tablet by mouth every 12 hours 20   Dion Figueroa MD   albuterol sulfate  (90 Base) MCG/ACT inhaler  20   Historical Provider, MD   ondansetron (ZOFRAN ODT) 4 MG disintegrating tablet Take 1 tablet by mouth every 8 hours as needed for Nausea or Vomiting 20   Dion Figueroa MD   levETIRAcetam (KEPPRA) 1000 MG tablet Take 1 tablet by mouth 2 times daily 20   Dion Figueroa MD   LORazepam (ATIVAN) 1 MG tablet Take 1 mg by mouth as needed for Anxiety. Historical Provider, MD   pantoprazole (PROTONIX) 40 MG tablet Take 1 tablet by mouth every morning (before breakfast) 5/6/20   Justin Wang MD   dicyclomine (BENTYL) 10 MG capsule Take 1 capsule by mouth 3 times daily as needed (abdominal pain) 4/13/20   [facility], CYRIL   oxyCODONE-acetaminophen (PERCOCET) 5-325 MG per tablet Take 1 tablet by mouth.  Every 4-6 hours PRN    Historical Provider, MD   Cholecalciferol (VITAMIN D3) 5000 units TABS Take 1 tablet by mouth daily    Historical Provider, MD   estradiol (ESTRACE) 0.5 MG tablet Take 0.5 mg by mouth daily    Historical Provider, MD   atenolol (TENORMIN) 25 MG tablet Take 25 mg by mouth daily    Historical Provider, MD   Multiple Vitamin (MVI, BARIATRIC ADVANTAGE MULTI-FORMULA, CHEW TAB) Take 1 tablet by mouth daily 2/22/19   Justin Wang MD   Calcium Citrate-Vitamin D (CALCIUM + VIT D, BARIATRIC ADVANTAGE, CHEWABLE TABLET) Take 1 tablet by mouth 2 times daily 2/22/19   Justin Wang MD   levothyroxine (SYNTHROID) 125 MCG tablet Take 1 tablet by mouth Daily  Patient taking differently: Take 125 mcg by mouth nightly  8/8/18   Gatito Munguia MD   gabapentin (NEURONTIN) 800 MG tablet Take 800 mg by mouth 3 times daily    Historical Provider, MD   PARoxetine (PAXIL) 30 MG tablet Take 50 mg by mouth nightly     Historical Provider, MD        [facility] Meds:    Current Facility-Administered Medications   Medication Dose Route Frequency Provider Last Rate Last Dose    diatrizoate meglumine-sodium (GASTROGRAFIN) 66-10 % solution 120 mL  120 mL Oral ONCE PRN Justin Wang MD   120 mL at 09/24/20 1108     Current Outpatient Medications   Medication Sig Dispense Refill    sucralfate (CARAFATE) 1 GM tablet Take 1 tablet by mouth every 12 hours 120 tablet 3    albuterol sulfate  (90 Base) MCG/ACT inhaler       ondansetron (ZOFRAN ODT) 4 MG disintegrating tablet Take 1 tablet by mouth every 8 hours as needed for Nausea or Vomiting 30 tablet 5    levETIRAcetam (KEPPRA) 1000 MG tablet Take 1 tablet by mouth 2 times daily 60 tablet 5    LORazepam (ATIVAN) 1 MG tablet Take 1 mg by mouth as needed for Anxiety.  pantoprazole (PROTONIX) 40 MG tablet Take 1 tablet by mouth every morning (before breakfast) 90 tablet 1    dicyclomine (BENTYL) 10 MG capsule Take 1 capsule by mouth 3 times daily as needed (abdominal pain) 21 capsule 3    oxyCODONE-acetaminophen (PERCOCET) 5-325 MG per tablet Take 1 tablet by mouth.  Every 4-6 hours PRN      Cholecalciferol (VITAMIN D3) 5000 units TABS Take 1 tablet by mouth daily      estradiol (ESTRACE) 0.5 MG tablet Take 0.5 mg by mouth daily      atenolol (TENORMIN) 25 MG tablet Take 25 mg by mouth daily      Multiple Vitamin (MVI, BARIATRIC ADVANTAGE MULTI-FORMULA, CHEW TAB) Take 1 tablet by mouth daily 30 tablet 5    Calcium Citrate-Vitamin D (CALCIUM + VIT D, BARIATRIC ADVANTAGE, CHEWABLE TABLET) Take 1 tablet by mouth 2 times daily 120 tablet 5    levothyroxine (SYNTHROID) 125 MCG tablet Take 1 tablet by mouth Daily (Patient taking differently: Take 125 mcg by mouth nightly ) 30 tablet 0    gabapentin (NEURONTIN) 800 MG tablet Take 800 mg by mouth 3 times daily      PARoxetine (PAXIL) 30 MG tablet Take 50 mg by mouth nightly          Social History / Family History:     Social History     Socioeconomic History    Marital status:      Spouse name: None    Number of children: None    Years of education: None    Highest education level: None   Occupational History    None   Social Needs    Financial resource strain: None    Food insecurity     Worry: None     Inability: None    Transportation needs     Medical: None     Non-medical: None   Tobacco Use    Smoking status: Never Smoker    Smokeless tobacco: Never Used   Substance and Sexual Activity    Alcohol use: No     Alcohol/week: 0.0 standard drinks    Drug use: No    Sexual activity: Not Currently Does not bruise/bleed easily. Psychiatric/Behavioral: Negative for agitation. All others reviewed and negative. Physical Exam:  Vital Signs:   Vitals:    09/24/20 0916   BP: 113/69   Pulse:    Resp:    Temp:    SpO2: 100%     Body mass index is 45.49 kg/m². General appearance: Pt Alert and oriented, in no apparent acute distress. HEENT:  KATE, Conjunctiva clear. EOMI, No JVDs, Bruits, Megalies: neither thyroid nor lymph nodes. Lungs: Clear to auscultation bilaterally. Heart: Regular rate and rhythm, S1, S2 normal, no murmur, rub or gallop. Abdomen: RUQ tender. Non distended. Positive bowel sounds. No hernias noted, no masses palpable. Extremities: Warm, well perfused, no cyanosis or edema. Skin: Skin color, texture, turgor normal.  Neurologic: Grossly normal, Cranial nerves from II to XII intact, Deep tendon reflexes normal.  Lymph nodes: No palpable LNs, Cervical, groins, abdominal.  Musculoskeletal: Bilateral Upper and lower extremities ROM within normal limits. Radiologic / Imaging / TESTING  Upper GI performed in my presence in the room with the radiologist:  FINDINGS:    There is some esophageal dysmotility.  Contrast passes into the stomach    without difficulty.  Postsurgical changes from sleeve gastrectomy appear    unchanged.  No area of stenosis.  No leak.  Contrast passes into the duodenum    without difficulty.              Impression    Postsurgical changes from sleeve gastrectomy without evidence of acute    complication.  No stricture or leak.       Labs:    Recent Results (from the past 24 hour(s))   Urinalysis    Collection Time: 09/24/20  7:00 AM   Result Value Ref Range    Color, UA YELLOW YELLOW    Clarity, UA SLIGHTLY CLOUDY (A) CLEAR    Glucose, Urine NEGATIVE NEGATIVE MG/DL    Bilirubin Urine SMALL (A) NEGATIVE MG/DL    Ketones, Urine NEGATIVE NEGATIVE MG/DL    Specific Gravity, UA 1.028 1.001 - 1.035    Blood, Urine NEGATIVE NEGATIVE    pH, Urine 5.0 5.0 - 8.0 Non-African American >60 >60 mL/min/1.73m2    GFR African American >60 >60 mL/min/1.73m2    Anion Gap 12 4 - 16   HCG Qualitative, Serum    Collection Time: 09/24/20  8:26 AM   Result Value Ref Range    hCG Qual INDETERMINATE    Lipase    Collection Time: 09/24/20  8:26 AM   Result Value Ref Range    Lipase 20 13 - 60 IU/L       Diagnosis:  Patient Active Problem List   Diagnosis    Fibromyalgia    Lupus (systemic lupus erythematosus) (HCC)    Chronic pancreatitis (HCC)    HTN (hypertension)    Generalized abdominal pain    Frequent UTI    Gastroesophageal reflux disease without esophagitis    Depression    Fatty liver disease, nonalcoholic    Arthritis    Bilateral low back pain with left-sided sciatica    Intractable vomiting with nausea    Hiatal hernia    Status post laparoscopic sleeve gastrectomy    Pseudoseizure    Chronic pain syndrome    Drug-seeking/Aberrant behavior    Lymph node disorder    Morbid obesity with BMI of 40.0-44.9, adult (HCC)    Iron deficiency anemia secondary to blood loss (chronic)    Encounter for weight management    Intractable nausea and vomiting    History of Jet-en-Y gastric bypass    Seizure (HCC)    Abdominal pain    Anxiety       Assessment & plan:  Teresa Kevin is a very pleasant 44 y.o. female presenting with RUQ pain and nausea and vomiting and dehydration,   She has a h/o sleeve gastrectomy, and an upper GI study that I ordered, was NEGATIVE for any gastric stricture, no abnormality in the stomach, needs GI consult for ? ERCP, she had MULTIPLE ERCPs before and stents helped her similar pains in the past.. ? Will consult Dr Melissa Barbosa, and seek his opinion re ERCP. Glenn Mistry Continue NPO/Bowel rest, IV Fluids, Pain control, Nausea control, for now and will follow.     Thank you doctor very much for your consultation and for the opportunity to take care of Mrs Teresa Kevin with you, I'll follow along with you and I'll update you on any new events in her care.    ____________________________________________    Darryl Craven MD, FACS, FICS    9/24/2020  12:04 PM      Patient was seen with total face to face time of 45 minutes. More than 50% of this visit was counseling and education as above in my assessment and plan section of my note.

## 2020-09-25 ENCOUNTER — APPOINTMENT (OUTPATIENT)
Dept: MRI IMAGING | Age: 50
End: 2020-09-25
Payer: COMMERCIAL

## 2020-09-25 LAB
ANION GAP SERPL CALCULATED.3IONS-SCNC: 8 MMOL/L (ref 4–16)
BASOPHILS ABSOLUTE: 0 K/CU MM
BASOPHILS RELATIVE PERCENT: 0.9 % (ref 0–1)
BUN BLDV-MCNC: 18 MG/DL (ref 6–23)
CALCIUM SERPL-MCNC: 9.6 MG/DL (ref 8.3–10.6)
CHLORIDE BLD-SCNC: 107 MMOL/L (ref 99–110)
CO2: 26 MMOL/L (ref 21–32)
CREAT SERPL-MCNC: 0.9 MG/DL (ref 0.6–1.1)
DIFFERENTIAL TYPE: ABNORMAL
EOSINOPHILS ABSOLUTE: 0.3 K/CU MM
EOSINOPHILS RELATIVE PERCENT: 5.8 % (ref 0–3)
GFR AFRICAN AMERICAN: >60 ML/MIN/1.73M2
GFR NON-AFRICAN AMERICAN: >60 ML/MIN/1.73M2
GLUCOSE BLD-MCNC: 93 MG/DL (ref 70–99)
HCT VFR BLD CALC: 34.6 % (ref 37–47)
HEMOGLOBIN: 10.9 GM/DL (ref 12.5–16)
IMMATURE NEUTROPHIL %: 0.2 % (ref 0–0.43)
LYMPHOCYTES ABSOLUTE: 1.9 K/CU MM
LYMPHOCYTES RELATIVE PERCENT: 44.9 % (ref 24–44)
MCH RBC QN AUTO: 26.7 PG (ref 27–31)
MCHC RBC AUTO-ENTMCNC: 31.5 % (ref 32–36)
MCV RBC AUTO: 84.6 FL (ref 78–100)
MONOCYTES ABSOLUTE: 0.3 K/CU MM
MONOCYTES RELATIVE PERCENT: 7.9 % (ref 0–4)
NUCLEATED RBC %: 0 %
PDW BLD-RTO: 13.3 % (ref 11.7–14.9)
PLATELET # BLD: 203 K/CU MM (ref 140–440)
PMV BLD AUTO: 10.2 FL (ref 7.5–11.1)
POTASSIUM SERPL-SCNC: 4.7 MMOL/L (ref 3.5–5.1)
RBC # BLD: 4.09 M/CU MM (ref 4.2–5.4)
SEGMENTED NEUTROPHILS ABSOLUTE COUNT: 1.7 K/CU MM
SEGMENTED NEUTROPHILS RELATIVE PERCENT: 40.3 % (ref 36–66)
SODIUM BLD-SCNC: 141 MMOL/L (ref 135–145)
TOTAL IMMATURE NEUTOROPHIL: 0.01 K/CU MM
TOTAL NUCLEATED RBC: 0 K/CU MM
WBC # BLD: 4.3 K/CU MM (ref 4–10.5)

## 2020-09-25 PROCEDURE — 6370000000 HC RX 637 (ALT 250 FOR IP): Performed by: FAMILY MEDICINE

## 2020-09-25 PROCEDURE — 99225 PR SBSQ OBSERVATION CARE/DAY 25 MINUTES: CPT | Performed by: SURGERY

## 2020-09-25 PROCEDURE — 85025 COMPLETE CBC W/AUTO DIFF WBC: CPT

## 2020-09-25 PROCEDURE — 6360000002 HC RX W HCPCS: Performed by: NURSE PRACTITIONER

## 2020-09-25 PROCEDURE — 96375 TX/PRO/DX INJ NEW DRUG ADDON: CPT

## 2020-09-25 PROCEDURE — G0378 HOSPITAL OBSERVATION PER HR: HCPCS

## 2020-09-25 PROCEDURE — 6360000002 HC RX W HCPCS: Performed by: FAMILY MEDICINE

## 2020-09-25 PROCEDURE — 96372 THER/PROPH/DIAG INJ SC/IM: CPT

## 2020-09-25 PROCEDURE — 80048 BASIC METABOLIC PNL TOTAL CA: CPT

## 2020-09-25 PROCEDURE — 96376 TX/PRO/DX INJ SAME DRUG ADON: CPT

## 2020-09-25 PROCEDURE — 94761 N-INVAS EAR/PLS OXIMETRY MLT: CPT

## 2020-09-25 PROCEDURE — 2580000003 HC RX 258: Performed by: FAMILY MEDICINE

## 2020-09-25 PROCEDURE — 74181 MRI ABDOMEN W/O CONTRAST: CPT

## 2020-09-25 RX ORDER — LACTOBACILLUS RHAMNOSUS GG 10B CELL
1 CAPSULE ORAL
Status: DISCONTINUED | OUTPATIENT
Start: 2020-09-26 | End: 2020-09-28 | Stop reason: HOSPADM

## 2020-09-25 RX ORDER — LORAZEPAM 2 MG/ML
1 INJECTION INTRAMUSCULAR ONCE
Status: COMPLETED | OUTPATIENT
Start: 2020-09-25 | End: 2020-09-25

## 2020-09-25 RX ORDER — PROMETHAZINE HYDROCHLORIDE 25 MG/ML
12.5 INJECTION, SOLUTION INTRAMUSCULAR; INTRAVENOUS EVERY 6 HOURS PRN
Status: DISCONTINUED | OUTPATIENT
Start: 2020-09-25 | End: 2020-09-28

## 2020-09-25 RX ADMIN — MULTIPLE VITAMINS W/ MINERALS TAB 1 TABLET: TAB at 14:11

## 2020-09-25 RX ADMIN — HYDROMORPHONE HYDROCHLORIDE 1 MG: 1 INJECTION, SOLUTION INTRAMUSCULAR; INTRAVENOUS; SUBCUTANEOUS at 19:30

## 2020-09-25 RX ADMIN — LORAZEPAM 1 MG: 2 INJECTION INTRAMUSCULAR; INTRAVENOUS at 11:47

## 2020-09-25 RX ADMIN — HYDROMORPHONE HYDROCHLORIDE 1 MG: 1 INJECTION, SOLUTION INTRAMUSCULAR; INTRAVENOUS; SUBCUTANEOUS at 07:03

## 2020-09-25 RX ADMIN — ESTRADIOL 0.5 MG: 1 TABLET ORAL at 14:20

## 2020-09-25 RX ADMIN — OXYCODONE HYDROCHLORIDE AND ACETAMINOPHEN 1 TABLET: 5; 325 TABLET ORAL at 21:25

## 2020-09-25 RX ADMIN — SUCRALFATE 1 G: 1 TABLET ORAL at 21:14

## 2020-09-25 RX ADMIN — OXYCODONE HYDROCHLORIDE AND ACETAMINOPHEN 1 TABLET: 5; 325 TABLET ORAL at 01:13

## 2020-09-25 RX ADMIN — GABAPENTIN 800 MG: 400 CAPSULE ORAL at 14:11

## 2020-09-25 RX ADMIN — ENOXAPARIN SODIUM 40 MG: 40 INJECTION SUBCUTANEOUS at 14:09

## 2020-09-25 RX ADMIN — PROMETHAZINE HYDROCHLORIDE 6.25 MG: 25 INJECTION INTRAMUSCULAR; INTRAVENOUS at 11:17

## 2020-09-25 RX ADMIN — HYDROMORPHONE HYDROCHLORIDE 1 MG: 1 INJECTION, SOLUTION INTRAMUSCULAR; INTRAVENOUS; SUBCUTANEOUS at 15:14

## 2020-09-25 RX ADMIN — HYDROMORPHONE HYDROCHLORIDE 1 MG: 1 INJECTION, SOLUTION INTRAMUSCULAR; INTRAVENOUS; SUBCUTANEOUS at 11:16

## 2020-09-25 RX ADMIN — GABAPENTIN 800 MG: 400 CAPSULE ORAL at 21:15

## 2020-09-25 RX ADMIN — PROMETHAZINE HYDROCHLORIDE 12.5 MG: 25 INJECTION INTRAMUSCULAR; INTRAVENOUS at 18:15

## 2020-09-25 RX ADMIN — FAMOTIDINE 20 MG: 20 TABLET ORAL at 14:12

## 2020-09-25 RX ADMIN — Medication 5000 UNITS: at 14:13

## 2020-09-25 RX ADMIN — PROMETHAZINE HYDROCHLORIDE 6.25 MG: 25 INJECTION INTRAMUSCULAR; INTRAVENOUS at 02:55

## 2020-09-25 RX ADMIN — LEVETIRACETAM 1000 MG: 500 TABLET, FILM COATED ORAL at 14:12

## 2020-09-25 RX ADMIN — FAMOTIDINE 20 MG: 20 TABLET ORAL at 21:14

## 2020-09-25 RX ADMIN — SODIUM CHLORIDE, PRESERVATIVE FREE 10 ML: 5 INJECTION INTRAVENOUS at 21:16

## 2020-09-25 RX ADMIN — ONDANSETRON 4 MG: 2 INJECTION INTRAMUSCULAR; INTRAVENOUS at 01:13

## 2020-09-25 RX ADMIN — LORAZEPAM 1 MG: 1 TABLET ORAL at 21:25

## 2020-09-25 RX ADMIN — SODIUM CHLORIDE: 9 INJECTION, SOLUTION INTRAVENOUS at 03:02

## 2020-09-25 RX ADMIN — PAROXETINE HYDROCHLORIDE 50 MG: 20 TABLET, FILM COATED ORAL at 21:14

## 2020-09-25 RX ADMIN — SODIUM CHLORIDE: 9 INJECTION, SOLUTION INTRAVENOUS at 19:47

## 2020-09-25 RX ADMIN — LEVETIRACETAM 1000 MG: 500 TABLET, FILM COATED ORAL at 21:15

## 2020-09-25 RX ADMIN — LEVOTHYROXINE SODIUM 125 MCG: 25 TABLET ORAL at 07:03

## 2020-09-25 RX ADMIN — HYDROMORPHONE HYDROCHLORIDE 1 MG: 1 INJECTION, SOLUTION INTRAMUSCULAR; INTRAVENOUS; SUBCUTANEOUS at 02:47

## 2020-09-25 RX ADMIN — PANTOPRAZOLE SODIUM 40 MG: 40 TABLET, DELAYED RELEASE ORAL at 14:20

## 2020-09-25 ASSESSMENT — PAIN SCALES - GENERAL
PAINLEVEL_OUTOF10: 0
PAINLEVEL_OUTOF10: 7
PAINLEVEL_OUTOF10: 7
PAINLEVEL_OUTOF10: 0
PAINLEVEL_OUTOF10: 7
PAINLEVEL_OUTOF10: 7
PAINLEVEL_OUTOF10: 8
PAINLEVEL_OUTOF10: 8
PAINLEVEL_OUTOF10: 6
PAINLEVEL_OUTOF10: 0
PAINLEVEL_OUTOF10: 0
PAINLEVEL_OUTOF10: 8
PAINLEVEL_OUTOF10: 8
PAINLEVEL_OUTOF10: 5
PAINLEVEL_OUTOF10: 5

## 2020-09-25 ASSESSMENT — PAIN DESCRIPTION - PROGRESSION
CLINICAL_PROGRESSION: GRADUALLY WORSENING
CLINICAL_PROGRESSION: NOT CHANGED
CLINICAL_PROGRESSION: GRADUALLY WORSENING
CLINICAL_PROGRESSION: NOT CHANGED

## 2020-09-25 ASSESSMENT — PAIN DESCRIPTION - FREQUENCY
FREQUENCY: CONTINUOUS
FREQUENCY: INTERMITTENT

## 2020-09-25 ASSESSMENT — PAIN DESCRIPTION - DIRECTION
RADIATING_TOWARDS: ACROSS
RADIATING_TOWARDS: RT FLANK
RADIATING_TOWARDS: ACROSS

## 2020-09-25 ASSESSMENT — PAIN DESCRIPTION - PAIN TYPE
TYPE: ACUTE PAIN
TYPE: CHRONIC PAIN

## 2020-09-25 ASSESSMENT — PAIN DESCRIPTION - ONSET
ONSET: ON-GOING

## 2020-09-25 ASSESSMENT — PAIN DESCRIPTION - LOCATION
LOCATION: ABDOMEN

## 2020-09-25 ASSESSMENT — PAIN DESCRIPTION - DESCRIPTORS
DESCRIPTORS: SHARP
DESCRIPTORS: SHOOTING
DESCRIPTORS: ACHING;DISCOMFORT
DESCRIPTORS: SHARP

## 2020-09-25 ASSESSMENT — PAIN - FUNCTIONAL ASSESSMENT
PAIN_FUNCTIONAL_ASSESSMENT: ACTIVITIES ARE NOT PREVENTED

## 2020-09-25 ASSESSMENT — PAIN DESCRIPTION - ORIENTATION
ORIENTATION: RIGHT
ORIENTATION: RIGHT
ORIENTATION: RIGHT;LEFT;MID
ORIENTATION: RIGHT

## 2020-09-25 NOTE — CONSULTS
[facility]  Gastroenterology Consultation    2020  11:03 AM    Patient:    Alexia Selby  : 1970   44 y.o. MRN: [id number]  Admitted: 2020  7:04 AM ATT: Karyna Beebe MD   1118/1118-A  AdmitDx: Nausea [R11.0]  Abdominal pain [R10.9]  Abdominal pain, unspecified abdominal location [R10.9]  PCP: Karyna Beebe MD    Reason for Consult: possible ERCP     Requesting Physician:  Karyna Beebe MD    History Obtained From:  Patient and review of all records    HISTORY OF PRESENT ILLNESS:                The patient is a 44 y.o. female with significant   Past Medical History:   Diagnosis Date    Acid reflux     Anemia     Anxiety     Arthritis     Hands, Back And Ankles    Bleeding ulcer     \"I Had Ulcers In My Stomach And Colon\"/ per pt on 2019\"they said recently having some blood in my stomach- in July ( )could not find where coming from \"    Bronchitis Last Episode     Chronic back pain     [facility]     [facility]    COPD (chronic obstructive pulmonary disease) ([facility]     Sees Dr. Dewey Parents Depression     Fibromyalgia Dx     H/O echocardiogram 8/11/15    EF >55%. LA to be at the upper limit of normal in size. LV hypertrophy with normal LV systolic, but abnormal diastolic function. Normal valvular structures and function.  H/O echocardiogram 2018    EF 55-60%    Hiatal hernia     History of blood transfusion 2015 And     No Reaction To Blood Transfusions Received    [place] (hard of hearing)     Right Ear    Hx of cardiac catheterization 10/24/2010    Angiographically normal coronary arteries w/ normal LV function and wall motion.  Hx of transesophageal echocardiography (PILO) for monitoring 2010    EF 55-60%. WNL.    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 name   Ashely Saucedo shine     Cleophus Leyden found that I have a kidney cyst but not sure which side\" per pt on 7/15/2020    Lupus ([address] Dx 2013    \"Rheumatoid Lupus\"    MDRO (multiple drug resistant organisms) resistance     4 months ago chest from mediport    Morbid obesity ([address]     MRSA bacteremia     Nausea     \"Most Of The Time\"    Pain management     Sees Dr. Augustus Marquis, Once A Month    Pancreatitis chronic Dx 2001    Pneumonia Dx 11-15    Shortness of breath on exertion     Shortness of breath on exertion     Sleep apnea     Has CPAP\"no longer use the cpap since lost weight\"    Staph infection Dx 11-15    Left Foot    Staph infection Dx 11-15    \"Left Foot\"    Teeth missing     Upper And Lower    Thyroid disease     Wears glasses     To Read    who presented to [facility] ED with c/o aching, progressive epigastric abdominal pain radiating to her back accompanied with N/V. Has gotten worse over the past 3 days. Has history of cholecystectomy, multiple ERCPs with biliary stenting for retained stones, chronic pneumobilia, s/p gastric sleeve surgery.      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    COPD (chronic obstructive pulmonary disease) ([facility]     Sees Dr. Argentina Underwood Depression     Fibromyalgia Dx 2013    H/O echocardiogram 8/11/15    EF >55%. LA to be at the upper limit of normal in size. LV hypertrophy with normal LV systolic, but abnormal diastolic function. Normal valvular structures and function.  H/O echocardiogram 08/30/2018    EF 55-60%    Hiatal hernia     History of blood transfusion 09/2015 And 2018    No Reaction To Blood Transfusions Received    [place] (hard of hearing)     Right Ear    Hx of cardiac catheterization 10/24/2010    Angiographically normal coronary arteries w/ normal LV function and wall motion.  Hx of transesophageal echocardiography (PILO) for monitoring 12/2/2010    EF 55-60%. WNL.    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 name    Kidney cysts     \"they found that I have a kidney cyst but not sure which side\" per pt on 7/15/2020    Lupus ([facility] Dx 2013    \"Rheumatoid Lupus\"    MDRO (multiple drug resistant organisms) resistance     4 months ago chest from mediport    Morbid obesity ([facility]     MRSA bacteremia     Nausea     \"Most Of The Time\"    Pain management     Sees Dr. Gerber Walker, Once A Month    Pancreatitis chronic Dx 2001    Pneumonia Dx 11-15    Shortness of breath on exertion     Shortness of breath on exertion     Sleep apnea     Has CPAP\"no longer use the cpap since lost weight\"    Staph infection Dx 11-15    Left Foot    Staph infection Dx 11-15    \"Left Foot\"    Teeth missing     Upper And Lower    Thyroid disease     Wears glasses     To Read       Past Surgical History:        Procedure Laterality Date    APPENDECTOMY  02/1998    Done When Tubes And Ovaries Were Removed    CARDIAC CATHETERIZATION  10/24/2010    CATHETER REMOVAL N/A 7/16/2019    PORT REMOVAL performed by Gabriela London MD at [address]  08/27/1991    CHOLECYSTECTOMY, LAPAROSCOPIC  1990's    COLONOSCOPY  Last Done In 2000's   [facility] DENTAL SURGERY      Teeth Extracted In Past    ENDOSCOPY, COLON, DIAGNOSTIC  Last Done In 2018    FOOT DEBRIDEMENT Left 6/16/2019    FIRST METATARSAL DEBRIDEMENT INCISION AND DRAINAGE. EXCISION OF ULCER.  RESECTION OF BONE. 1ST METATARSAL POWER LAVAGE AND BONE CEMENT performed by Bladimir Abdul DPM at [address] Left Last Done In 2016     Dr. Reji Queen, \" About 6 Surgeries Done Because Of Staph Infection\"    HIATAL HERNIA REPAIR N/A 2/12/2019    HERNIA HIATAL LAPAROSCOPIC ROBOTIC performed by Gabriela London MD at [address]  10/1997    Partial Abdominal Hysterectomy    INSERTION / REMOVAL / REPLACEMENT VENOUS ACCESS CATHETER N/A 8/15/2019    PORT INSERTION performed by Gabriela London MD at [address] [facility]    removed after 6 months    LUNG REMOVAL, PARTIAL Left 2008    Benign    OTHER SURGICAL HISTORY  02/1998    \"Tubes And Ovaries Removed, Appendectomy Also Done\"    OTHER SURGICAL HISTORY  Last Done 7-15-16    Mediport Insertion \"Total Of Six Done, Removed Last Mediport In 2014\"    PORT SURGERY N/A 1/15/2020    PORT REMOVAL performed by Gabriela London MD at [address] N/A 7/6/2020    PORT INSERTION [facility] N/A 2/12/2019    GASTRECTOMY SLEEVE LAPAROSCOPIC ROBOTIC performed by Gabriela London MD at [address]  08/27/2018    UPPER GASTROINTESTINAL ENDOSCOPY N/A 4/2/2019    EGD CONTROL HEMORRHAGE with epi injection at bleeding site performed by Gabriela London MD at [address] [facility] 6/19/2019    EGD DIAGNOSTIC ONLY performed by Kim Marcelo MD at [facility] N/A 7/22/2019    EGD DIAGNOSTIC ONLY performed by Hector uFlton MD at [facility] N/A 10/14/2019    EGD DIAGNOSTIC ONLY performed by Kim Marcelo MD at [facility] N/A 7/17/2020    EGJ DILATATION BALLOON WITH 18-20 MM BALLOON, DILATED TO 20 MM. performed by Kim Marcelo MD at [facility] ENDOSCOPY       Current Medications:    Medications    Scheduled Medications:    PARoxetine  50 mg Oral Nightly    gabapentin  800 mg Oral TID    levothyroxine  125 mcg Oral Daily    therapeutic multivitamin-minerals  1 tablet Oral Daily    Calcium Citrate-Vitamin D  1 tablet Oral BID    estradiol  0.5 mg Oral Daily    atenolol  25 mg Oral Daily    vitamin D  5 capsule Oral Daily    pantoprazole  40 mg Oral QAM AC    levETIRAcetam  1,000 mg Oral BID    sucralfate  1 g Oral 2 times per day    sodium chloride flush  10 mL Intravenous 2 times per day    famotidine  20 mg Oral BID    nicotine  1 patch Transdermal Daily    enoxaparin  40 mg Subcutaneous Daily     PRN Medications: diatrizoate meglumine-sodium, oxyCODONE-acetaminophen, dicyclomine, LORazepam, ondansetron, sodium chloride flush, potassium chloride **OR** potassium alternative oral replacement **OR** potassium chloride, magnesium sulfate, acetaminophen **OR** acetaminophen, polyethylene glycol, fleet, promethazine **OR** ondansetron, promethazine, HYDROmorphone      Allergies:  Aspirin; Shellfish-derived products; Toradol [ketorolac tromethamine]; Compazine [prochlorperazine]; and Reglan [metoclopramide]    Social History:   TOBACCO:   reports that she has never smoked. She has never used smokeless tobacco.  ETOH:   reports no history of alcohol use.     Family History:       Problem Relation Age of Onset    Diabetes Mother         \"Borderline Diabetes\"    High Blood Pressure Mother     Obesity Mother     Arthritis Mother     Heart Disease Mother     High Cholesterol Mother     Vision Loss Mother     Diabetes Father     High Blood Pressure Father     Asthma Father     Cancer Father         prostate cancer    High Blood Pressure Brother     Asthma Son     Vision Loss Son     Lupus Daughter     Other Daughter         \"Alot Of Female Problems\"       No family history of colon cancer, Crohn's disease, or ulcerative colitis. REVIEW OF SYSTEMS:    The positive ROS will be identified in bold, otherwise ROS are negative     CONSTITUTIONAL:  Neg   Recent weight changes 100 lb over past 1 year, fatigue, fever, chills or night sweats  EYES:  Neg  Blurriness, earing, itching or acute change in vision  EARS:  Neg  hearing loss, tinnitus, vertigo, discharge or earache. NOSE:  Neg  Rhinorrhea, sneezing, itching, allergy or epistaxis  MOUTH/THROAT:  Neg  bleeding gums, hoarseness or sore throat. RESPIRATORY:   Neg SOB, wheeze, cough, sputum, hemoptysis or bronchitis  CARDIOVASCULAR:  Neg chest pain, palpitations, dyspnea on exertion, orthopnea, paroxysmal nocturnal dyspnea or edema  GASTROINTESTINAL:  SEE HPI  GENITOURINARY:  Neg  Urinary frequency, hesitancy, urgency, polyuria, dysuria, hematuria, nocturia, or incontinence. HEMATOLOGIC/LYMPHATIC:  Neg  Anemia, bleeding tendency  MUSCULOSKELETAL:    New myalgias, bone pain, joint pain, swelling or stiffness and has had change in gait. NEUROLOGICAL:  Neg  Loss of Consciousness, memory loss, forgetfulness, periods of confusion, difficulty concentrating, seizures, decline in intellect, nervousness, insomina, aphasia or dysarthria. SKIN:  Neg  skin or hair changes, and has no itching, rashes, or sores. PSYCHIATRIC:  Neg depression, personality changes, anxiety. ENDOCRINE:  Neg polydipsia, polyuria, abnormal weight changes, heat /cold intolerance.   ALL/IMM:  Neg reactions to drugs other than listed    PHYSICAL EXAM:      Vitals:    /74 732.49 ml   Output 50 ml   Net 682.49 ml      sodium chloride 75 mL/hr at 09/25/20 0302       Imaging Studies: Reviewed     IMPRESSION:      Patient Active Problem List   Diagnosis Code    Fibromyalgia M79.7    Lupus (systemic lupus erythematosus) (HCC) M32.9    Chronic pancreatitis (HCC) K86.1    HTN (hypertension) I10    Generalized abdominal pain R10.84    Frequent UTI N39.0    Gastroesophageal reflux disease without esophagitis K21.9    Depression F32.9    Fatty liver disease, nonalcoholic M26.2    Arthritis M19.90    Bilateral low back pain with left-sided sciatica M54.42    Intractable vomiting with nausea R11.2    Hiatal hernia K44.9    Status post laparoscopic sleeve gastrectomy Z98.84    Pseudoseizure F44.5    Chronic pain syndrome G89.4    Drug-seeking/Aberrant behavior Z76.5    Lymph node disorder I89.9    Morbid obesity with BMI of 40.0-44.9, adult ([facility]) E66.01, Z68.41    Iron deficiency anemia secondary to blood loss (chronic) D50.0    Encounter for weight management Z76.89    Intractable nausea and vomiting R11.2    History of Jet-en-Y gastric bypass Z98.84    Seizure ([facility]) R56.9    Abdominal pain R10.9    Anxiety F41.9       ASSESSMENT/RECOMMENDATIONS:    Abdominal pain, chronic RUQ:  Pneumobilia chronic, secondary to multiple ERCPs with splinterotomies in past  Recent GI workup earlier this month, at [facility] unremarkable, no clear etiology of abdominal pain, no findings that needed specific intervention/stenting.    Continue PPI daily, Carafate BID   MRI without contrast and MRCP today showed no signs of biliary obstruction or fistula  Continue symptomatic management with pain, and nausea medication as needed, daily probiotic   May benefit from referral to IU, discussed with pt, refused last admission, but he is now considering this option   Surgery also following      Discussed plan of care with patient and Sister Radha Townsend RN    Patient clinical, biochemical, and radiological information discussed with Dr. Carrie Ly. He agrees with the assessment and plan. Mary Zhu, CNP      Pneumobilia due to wide sphincterotomies  ERCP not indicated  Will refer to [address] at [facility] for further recommendations  Treat pain symptomatically'  May have IBS  May have sibo    I have seen and examined this patient personally, and independently of the nurse practitioner. The plan was developed mutually at the time of the visit with the patient. Power Phillips and myself have spoken with patient, nursing staff and provided written and verbal instructions .     The above note has been reviewed and I agree with the Assessment,  Diagnosis, and Treatment plan as suggested by Power Phillips [address] [facility] gastroenterology  9/25/2020  11:03 AM

## 2020-09-25 NOTE — PROGRESS NOTES
Attending Progress Note      PCP: Ap Singer MD      Patient: Trinidad Soto   Gender: female  : 1970   Age: 44 y.o. MRN: [id number]  1118/1118-A      Date of Admission: 2020    Chief Complaint:   Chief Complaint   Patient presents with    Abdominal Pain    Flank Pain     right           Subjective: abd pain . .. nausea but no vomiting          Medications:  Reviewed  Infusion Medications    sodium chloride 75 mL/hr at 20 0302     Scheduled Medications    PARoxetine  50 mg Oral Nightly    gabapentin  800 mg Oral TID    levothyroxine  125 mcg Oral Daily    therapeutic multivitamin-minerals  1 tablet Oral Daily    Calcium Citrate-Vitamin D  1 tablet Oral BID    estradiol  0.5 mg Oral Daily    atenolol  25 mg Oral Daily    vitamin D  5 capsule Oral Daily    pantoprazole  40 mg Oral QAM AC    levETIRAcetam  1,000 mg Oral BID    sucralfate  1 g Oral 2 times per day    sodium chloride flush  10 mL Intravenous 2 times per day    famotidine  20 mg Oral BID    nicotine  1 patch Transdermal Daily    enoxaparin  40 mg Subcutaneous Daily     PRN Meds: diatrizoate meglumine-sodium, oxyCODONE-acetaminophen, dicyclomine, LORazepam, ondansetron, sodium chloride flush, potassium chloride **OR** potassium alternative oral replacement **OR** potassium chloride, magnesium sulfate, acetaminophen **OR** acetaminophen, polyethylene glycol, fleet, promethazine **OR** ondansetron, promethazine, HYDROmorphone      Intake/Output Summary (Last 24 hours) at 2020 0911  Last data filed at 2020 0121  Gross per 24 hour   Intake 732.49 ml   Output 50 ml   Net 682.49 ml       Exam:  /74   Pulse 58   Temp 97.7 °F (36.5 °C) (Oral)   Resp 12   Ht 5' 4\" (1.626 m)   Wt 265 lb (120.2 kg)   SpO2 97%   BMI 45.49 kg/m²   General appearance: No distress,   Respiratory:  good air entry , no Rales , No wheezing, or rhonchi,  Cardiovascular: RRR, with normal S1/S2 .   Abdomen : Soft,

## 2020-09-25 NOTE — PROGRESS NOTES
GENERAL SURGERY PROGRESS NOTE    CC/HPI:           Patient feels the same RUQ pain, and nausea. .       Vitals:    09/24/20 1039 09/24/20 1245 09/24/20 2100 09/25/20 0345   BP: 123/72 139/88 138/80 115/72   Pulse:  59 55 55   Resp:  16 16 16   Temp:  97.8 °F (36.6 °C) 97.3 °F (36.3 °C) 97.5 °F (36.4 °C)   TempSrc:  Oral Oral Oral   SpO2: 96% 100% 94% 96%   Weight:       Height:         I/O last 3 completed shifts: In: 30 [I.V.:30]  Out: -   I/O this shift:   In: 702.5 [I.V.:702.5]  Out: 50 [Emesis/NG output:50]    DIET CLEAR LIQUID;    Recent Results (from the past 48 hour(s))   Urinalysis    Collection Time: 09/24/20  7:00 AM   Result Value Ref Range    Color, UA YELLOW YELLOW    Clarity, UA SLIGHTLY CLOUDY (A) CLEAR    Glucose, Urine NEGATIVE NEGATIVE MG/DL    Bilirubin Urine SMALL (A) NEGATIVE MG/DL    Ketones, Urine NEGATIVE NEGATIVE MG/DL    Specific Gravity, UA 1.028 1.001 - 1.035    Blood, Urine NEGATIVE NEGATIVE    pH, Urine 5.0 5.0 - 8.0    Protein, UA 30 (A) NEGATIVE MG/DL    Urobilinogen, Urine NORMAL 0.2 - 1.0 MG/DL    Nitrite Urine, Quantitative NEGATIVE NEGATIVE    Leukocyte Esterase, Urine NEGATIVE NEGATIVE    RBC, UA 1 0 - 6 /HPF    WBC, UA 2 0 - 5 /HPF    Bacteria, UA FEW (A) NEGATIVE /HPF    Squam Epithel, UA 17 /HPF    Mucus, UA OCCASIONAL (A) NEGATIVE HPF    Trichomonas, UA NONE SEEN NONE SEEN /HPF    Hyaline Casts, UA 10 /LPF   ICTOTEST, URINE    Collection Time: 09/24/20  7:00 AM   Result Value Ref Range    Ictotest NEGATIVE    CBC Auto Differential    Collection Time: 09/24/20  8:26 AM   Result Value Ref Range    WBC 5.4 4.0 - 10.5 K/CU MM    RBC 4.23 4.2 - 5.4 M/CU MM    Hemoglobin 11.2 (L) 12.5 - 16.0 GM/DL    Hematocrit 36.5 (L) 37 - 47 %    MCV 86.3 78 - 100 FL    MCH 26.5 (L) 27 - 31 PG    MCHC 30.7 (L) 32.0 - 36.0 %    RDW 13.3 11.7 - 14.9 %    Platelets 643 065 - 158 K/CU MM    MPV 10.2 7.5 - 11.1 FL    Differential Type AUTOMATED DIFFERENTIAL     Segs Relative 44.1 36 - 66 % Lymphocytes % 40.7 24 - 44 %    Monocytes % 9.2 (H) 0 - 4 %    Eosinophils % 5.3 (H) 0 - 3 %    Basophils % 0.7 0 - 1 %    Segs Absolute 2.4 K/CU MM    Lymphocytes Absolute 2.2 K/CU MM    Monocytes Absolute 0.5 K/CU MM    Eosinophils Absolute 0.3 K/CU MM    Basophils Absolute 0.0 K/CU MM    Nucleated RBC % 0.0 %    Total Nucleated RBC 0.0 K/CU MM    Total Immature Neutrophil 0.00 K/CU MM    Immature Neutrophil % 0.0 0 - 0.43 %   Comprehensive Metabolic Panel    Collection Time: 09/24/20  8:26 AM   Result Value Ref Range    Sodium 140 135 - 145 MMOL/L    Potassium 4.4 3.5 - 5.1 MMOL/L    Chloride 107 99 - 110 mMol/L    CO2 21 21 - 32 MMOL/L    BUN 25 (H) 6 - 23 MG/DL    CREATININE 0.9 0.6 - 1.1 MG/DL    Glucose 94 70 - 99 MG/DL    Calcium 10.2 8.3 - 10.6 MG/DL    Alb 4.2 3.4 - 5.0 GM/DL    Total Protein 6.3 (L) 6.4 - 8.2 GM/DL    Total Bilirubin 0.1 0.0 - 1.0 MG/DL    ALT 20 10 - 40 U/L    AST 26 15 - 37 IU/L    Alkaline Phosphatase 100 40 - 129 IU/L    GFR Non-African American >60 >60 mL/min/1.73m2    GFR African American >60 >60 mL/min/1.73m2    Anion Gap 12 4 - 16   HCG Qualitative, Serum    Collection Time: 09/24/20  8:26 AM   Result Value Ref Range    hCG Qual INDETERMINATE    Lipase    Collection Time: 09/24/20  8:26 AM   Result Value Ref Range    Lipase 20 13 - 60 IU/L   Basic Metabolic Panel w/ Reflex to MG    Collection Time: 09/25/20  4:00 AM   Result Value Ref Range    Sodium 141 135 - 145 MMOL/L    Potassium 4.7 3.5 - 5.1 MMOL/L    Chloride 107 99 - 110 mMol/L    CO2 26 21 - 32 MMOL/L    Anion Gap 8 4 - 16    BUN 18 6 - 23 MG/DL    CREATININE 0.9 0.6 - 1.1 MG/DL    Glucose 93 70 - 99 MG/DL    Calcium 9.6 8.3 - 10.6 MG/DL    GFR Non-African American >60 >60 mL/min/1.73m2    GFR African American >60 >60 mL/min/1.73m2   CBC auto differential    Collection Time: 09/25/20  4:00 AM   Result Value Ref Range    WBC 4.3 4.0 - 10.5 K/CU MM    RBC 4.09 (L) 4.2 - 5.4 M/CU MM    Hemoglobin 10.9 (L) 12.5 - 16.0 GM/DL Hematocrit 34.6 (L) 37 - 47 %    MCV 84.6 78 - 100 FL    MCH 26.7 (L) 27 - 31 PG    MCHC 31.5 (L) 32.0 - 36.0 %    RDW 13.3 11.7 - 14.9 %    Platelets 880 706 - 014 K/CU MM    MPV 10.2 7.5 - 11.1 FL    Differential Type AUTOMATED DIFFERENTIAL     Segs Relative 40.3 36 - 66 %    Lymphocytes % 44.9 (H) 24 - 44 %    Monocytes % 7.9 (H) 0 - 4 %    Eosinophils % 5.8 (H) 0 - 3 %    Basophils % 0.9 0 - 1 %    Segs Absolute 1.7 K/CU MM    Lymphocytes Absolute 1.9 K/CU MM    Monocytes Absolute 0.3 K/CU MM    Eosinophils Absolute 0.3 K/CU MM    Basophils Absolute 0.0 K/CU MM    Nucleated RBC % 0.0 %    Total Nucleated RBC 0.0 K/CU MM    Total Immature Neutrophil 0.01 K/CU MM    Immature Neutrophil % 0.2 0 - 0.43 %       Scheduled Meds:   PARoxetine  50 mg Oral Nightly    gabapentin  800 mg Oral TID    levothyroxine  125 mcg Oral Daily    therapeutic multivitamin-minerals  1 tablet Oral Daily    Calcium Citrate-Vitamin D  1 tablet Oral BID    estradiol  0.5 mg Oral Daily    atenolol  25 mg Oral Daily    vitamin D  5 capsule Oral Daily    pantoprazole  40 mg Oral QAM AC    levETIRAcetam  1,000 mg Oral BID    sucralfate  1 g Oral 2 times per day    sodium chloride flush  10 mL Intravenous 2 times per day    famotidine  20 mg Oral BID    nicotine  1 patch Transdermal Daily    enoxaparin  40 mg Subcutaneous Daily       Continuous Infusions:   sodium chloride 75 mL/hr at 09/25/20 0302       Physical Exam:  HEENT: Anicteric sclerae, Oropharyngeal mucosae moist, pink and intact. Heart:  Normal S1 and S2, RRR  Lungs: Clear to auscultation bilaterally, No audible Wheezes or Rales. Extremities: No edema. Neuro: Alert and Oriented x 3, Non focal.  Abdomen: Soft, Benign, RUQ tender, Non distended, Positive bowel sounds. Active Problems:    Abdominal pain  Resolved Problems:    * No resolved hospital problems.  *      Assessment and Plan:  Cristy Owusu is a 44 y.o. female with a h/o sleeve gastrectomy, and an

## 2020-09-26 PROCEDURE — 6360000002 HC RX W HCPCS: Performed by: FAMILY MEDICINE

## 2020-09-26 PROCEDURE — 6370000000 HC RX 637 (ALT 250 FOR IP): Performed by: FAMILY MEDICINE

## 2020-09-26 PROCEDURE — 94761 N-INVAS EAR/PLS OXIMETRY MLT: CPT

## 2020-09-26 PROCEDURE — G0378 HOSPITAL OBSERVATION PER HR: HCPCS

## 2020-09-26 PROCEDURE — 6360000002 HC RX W HCPCS: Performed by: NURSE PRACTITIONER

## 2020-09-26 PROCEDURE — 2580000003 HC RX 258: Performed by: FAMILY MEDICINE

## 2020-09-26 PROCEDURE — 6370000000 HC RX 637 (ALT 250 FOR IP): Performed by: NURSE PRACTITIONER

## 2020-09-26 PROCEDURE — 96372 THER/PROPH/DIAG INJ SC/IM: CPT

## 2020-09-26 PROCEDURE — 96376 TX/PRO/DX INJ SAME DRUG ADON: CPT

## 2020-09-26 PROCEDURE — 99226 PR SBSQ OBSERVATION CARE/DAY 35 MINUTES: CPT | Performed by: SURGERY

## 2020-09-26 RX ORDER — MECLIZINE HYDROCHLORIDE 25 MG/1
25 TABLET ORAL 3 TIMES DAILY PRN
Status: DISCONTINUED | OUTPATIENT
Start: 2020-09-26 | End: 2020-09-28 | Stop reason: HOSPADM

## 2020-09-26 RX ADMIN — PROMETHAZINE HYDROCHLORIDE 12.5 MG: 25 INJECTION INTRAMUSCULAR; INTRAVENOUS at 17:35

## 2020-09-26 RX ADMIN — GABAPENTIN 800 MG: 400 CAPSULE ORAL at 21:54

## 2020-09-26 RX ADMIN — PROMETHAZINE HYDROCHLORIDE 12.5 MG: 25 INJECTION INTRAMUSCULAR; INTRAVENOUS at 00:31

## 2020-09-26 RX ADMIN — SUCRALFATE 1 G: 1 TABLET ORAL at 21:54

## 2020-09-26 RX ADMIN — SODIUM CHLORIDE, PRESERVATIVE FREE 10 ML: 5 INJECTION INTRAVENOUS at 21:57

## 2020-09-26 RX ADMIN — HYDROMORPHONE HYDROCHLORIDE 1 MG: 1 INJECTION, SOLUTION INTRAMUSCULAR; INTRAVENOUS; SUBCUTANEOUS at 17:35

## 2020-09-26 RX ADMIN — PAROXETINE HYDROCHLORIDE 50 MG: 20 TABLET, FILM COATED ORAL at 21:53

## 2020-09-26 RX ADMIN — GABAPENTIN 800 MG: 400 CAPSULE ORAL at 09:11

## 2020-09-26 RX ADMIN — ENOXAPARIN SODIUM 40 MG: 40 INJECTION SUBCUTANEOUS at 09:12

## 2020-09-26 RX ADMIN — ATENOLOL 25 MG: 25 TABLET ORAL at 09:11

## 2020-09-26 RX ADMIN — GABAPENTIN 800 MG: 400 CAPSULE ORAL at 13:25

## 2020-09-26 RX ADMIN — PANTOPRAZOLE SODIUM 40 MG: 40 TABLET, DELAYED RELEASE ORAL at 05:31

## 2020-09-26 RX ADMIN — SUCRALFATE 1 G: 1 TABLET ORAL at 09:11

## 2020-09-26 RX ADMIN — ESTRADIOL 0.5 MG: 1 TABLET ORAL at 09:11

## 2020-09-26 RX ADMIN — FAMOTIDINE 20 MG: 20 TABLET ORAL at 09:11

## 2020-09-26 RX ADMIN — PROMETHAZINE HYDROCHLORIDE 12.5 MG: 25 INJECTION INTRAMUSCULAR; INTRAVENOUS at 09:20

## 2020-09-26 RX ADMIN — LEVETIRACETAM 1000 MG: 500 TABLET, FILM COATED ORAL at 09:11

## 2020-09-26 RX ADMIN — HYDROMORPHONE HYDROCHLORIDE 1 MG: 1 INJECTION, SOLUTION INTRAMUSCULAR; INTRAVENOUS; SUBCUTANEOUS at 21:54

## 2020-09-26 RX ADMIN — MULTIPLE VITAMINS W/ MINERALS TAB 1 TABLET: TAB at 09:11

## 2020-09-26 RX ADMIN — Medication 1 CAPSULE: at 09:11

## 2020-09-26 RX ADMIN — HYDROMORPHONE HYDROCHLORIDE 1 MG: 1 INJECTION, SOLUTION INTRAMUSCULAR; INTRAVENOUS; SUBCUTANEOUS at 04:22

## 2020-09-26 RX ADMIN — HYDROMORPHONE HYDROCHLORIDE 1 MG: 1 INJECTION, SOLUTION INTRAMUSCULAR; INTRAVENOUS; SUBCUTANEOUS at 09:20

## 2020-09-26 RX ADMIN — HYDROMORPHONE HYDROCHLORIDE 1 MG: 1 INJECTION, SOLUTION INTRAMUSCULAR; INTRAVENOUS; SUBCUTANEOUS at 13:25

## 2020-09-26 RX ADMIN — LEVOTHYROXINE SODIUM 125 MCG: 25 TABLET ORAL at 05:31

## 2020-09-26 RX ADMIN — MECLIZINE HYDROCHLORIDE 25 MG: 25 TABLET ORAL at 13:26

## 2020-09-26 RX ADMIN — FAMOTIDINE 20 MG: 20 TABLET ORAL at 21:54

## 2020-09-26 RX ADMIN — HYDROMORPHONE HYDROCHLORIDE 1 MG: 1 INJECTION, SOLUTION INTRAMUSCULAR; INTRAVENOUS; SUBCUTANEOUS at 00:27

## 2020-09-26 RX ADMIN — DICYCLOMINE HYDROCHLORIDE 10 MG: 10 CAPSULE ORAL at 04:22

## 2020-09-26 RX ADMIN — Medication 5000 UNITS: at 09:11

## 2020-09-26 RX ADMIN — ONDANSETRON 4 MG: 2 INJECTION INTRAMUSCULAR; INTRAVENOUS at 21:54

## 2020-09-26 RX ADMIN — LORAZEPAM 1 MG: 1 TABLET ORAL at 00:34

## 2020-09-26 RX ADMIN — LEVETIRACETAM 1000 MG: 500 TABLET, FILM COATED ORAL at 21:53

## 2020-09-26 RX ADMIN — SODIUM CHLORIDE: 9 INJECTION, SOLUTION INTRAVENOUS at 11:16

## 2020-09-26 RX ADMIN — ONDANSETRON 4 MG: 2 INJECTION INTRAMUSCULAR; INTRAVENOUS at 13:25

## 2020-09-26 ASSESSMENT — PAIN DESCRIPTION - FREQUENCY
FREQUENCY: INTERMITTENT

## 2020-09-26 ASSESSMENT — PAIN DESCRIPTION - DESCRIPTORS
DESCRIPTORS: DISCOMFORT
DESCRIPTORS: ACHING;DISCOMFORT
DESCRIPTORS: DISCOMFORT

## 2020-09-26 ASSESSMENT — PAIN DESCRIPTION - ORIENTATION
ORIENTATION: RIGHT;LEFT;MID
ORIENTATION: RIGHT;LEFT;MID

## 2020-09-26 ASSESSMENT — PAIN DESCRIPTION - LOCATION
LOCATION: ABDOMEN

## 2020-09-26 ASSESSMENT — ENCOUNTER SYMPTOMS
ABDOMINAL PAIN: 1
NAUSEA: 1
RESPIRATORY NEGATIVE: 1
EYES NEGATIVE: 1
BACK PAIN: 1
ALLERGIC/IMMUNOLOGIC NEGATIVE: 1

## 2020-09-26 ASSESSMENT — PAIN DESCRIPTION - PAIN TYPE
TYPE: CHRONIC PAIN

## 2020-09-26 ASSESSMENT — PAIN DESCRIPTION - ONSET
ONSET: ON-GOING

## 2020-09-26 ASSESSMENT — PAIN DESCRIPTION - PROGRESSION: CLINICAL_PROGRESSION: GRADUALLY WORSENING

## 2020-09-26 ASSESSMENT — PAIN - FUNCTIONAL ASSESSMENT
PAIN_FUNCTIONAL_ASSESSMENT: ACTIVITIES ARE NOT PREVENTED

## 2020-09-26 ASSESSMENT — PAIN SCALES - GENERAL
PAINLEVEL_OUTOF10: 7
PAINLEVEL_OUTOF10: 7
PAINLEVEL_OUTOF10: 9
PAINLEVEL_OUTOF10: 9
PAINLEVEL_OUTOF10: 0
PAINLEVEL_OUTOF10: 10
PAINLEVEL_OUTOF10: 9
PAINLEVEL_OUTOF10: 0

## 2020-09-26 NOTE — PROGRESS NOTES
General Surgery-Dr. Jen Leon Day: 3    Chief Complaint on Admission: acute on chronic abdominal pain       Subjective:     Reports right sided abdominal pain that radiates to her right side and around to her spine. Denies any pain on left side. Tolerating regular diet \"in bites. \" Some nausea. Reports ambulating \"to and from the bathroom. \"  +BM. Without any other complaints at this time. Was seen by GI yesterday. Had MRI yesterday    ROS:  Review of Systems   Constitutional: Negative. HENT: Negative. Eyes: Negative. Respiratory: Negative. Cardiovascular: Negative. Gastrointestinal: Positive for abdominal pain and nausea. Endocrine: Negative. Genitourinary: Negative. Musculoskeletal: Positive for back pain. Skin: Negative. Allergic/Immunologic: Negative. Neurological: Negative. Hematological: Negative. Psychiatric/Behavioral: Negative. Allergies  Aspirin; Shellfish-derived products; Toradol [ketorolac tromethamine]; Compazine [prochlorperazine]; and Reglan [metoclopramide]          Diagnosis Date    Acid reflux     Anemia     Anxiety     Arthritis     Hands, Back And Ankles    Bleeding ulcer 2014    \"I Had Ulcers In My Stomach And Colon\"/ per pt on 8/12/2019\"they said recently having some blood in my stomach- in July ( 2019)could not find where coming from \"    Bronchitis Last Episode 2014    Chronic back pain     Chronic pain     [facility] Loi Mirza [facility]     Sees Dr. Brook Malave Depression     Fibromyalgia Dx 2013    H/O echocardiogram 8/11/15    EF >55%. LA to be at the upper limit of normal in size. LV hypertrophy with normal LV systolic, but abnormal diastolic function. Normal valvular structures and function.      H/O echocardiogram 08/30/2018    EF 55-60%    Hiatal hernia     History of blood transfusion 09/2015 And 2018    No Reaction To Blood Transfusions Received   Luz Bowling [place] (hard of hearing)     Right Ear    Hx of cardiac catheterization 10/24/2010    Angiographically normal coronary arteries w/ normal LV function and wall motion.  Hx of transesophageal echocardiography (PILO) for monitoring 2010    EF 55-60%. WNL. 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 name    Kidney cysts     \"they found that I have a kidney cyst but not sure which side\" per pt on 7/15/2020    Lupus ([address] Dx     \"Rheumatoid Lupus\"    MDRO (multiple drug resistant organisms) resistance     4 months ago chest from mediport    Morbid obesity ([address]     MRSA bacteremia     Nausea     \"Most Of The Time\"    Pain management     Sees Dr. Treasure Kennedy, Once A Month    Pancreatitis chronic Dx     Pneumonia Dx 11-15    Shortness of breath on exertion     Shortness of breath on exertion     Sleep apnea     Has CPAP\"no longer use the cpap since lost weight\"    Staph infection Dx 11-15    Left Foot    Staph infection Dx -15    \"Left Foot\"    Teeth missing     Upper And Lower    Thyroid disease     Wears glasses     To Read       Objective:     Vitals:    20 0907   BP: 128/70   Pulse: 73   Resp: 15   Temp: 97.5 °F (36.4 °C)   SpO2: 97%       TEMPERATURE:  Current -Temp: 97.5 °F (36.4 °C); Max - Temp  Av.1 °F (36.7 °C)  Min: 97.5 °F (36.4 °C)  Max: 98.6 °F (37 °C)    No intake/output data recorded. I/O last 3 completed shifts: In: 4223.8 [P.O.:1360; I.V.:2863.8]  Out: 900 [Urine:800; Emesis/NG output:100]      Physical Exam:  Physical Exam  Vitals signs reviewed. Constitutional:       General: She is not in acute distress. Appearance: She is obese.  She is not ill-appearing, toxic-appearing sulfate, acetaminophen **OR** acetaminophen, polyethylene glycol, fleet, promethazine **OR** ondansetron, HYDROmorphone      Labs/Imaging Results:   Lab Results   Component Value Date    WBC 4.3 09/25/2020    HGB 10.9 (L) 09/25/2020    HCT 34.6 (L) 09/25/2020    MCV 84.6 09/25/2020     09/25/2020     Lab Results   Component Value Date     09/25/2020    K 4.7 09/25/2020     09/25/2020    CO2 26 09/25/2020    BUN 18 09/25/2020    CREATININE 0.9 09/25/2020    GLUCOSE 93 09/25/2020    CALCIUM 9.6 09/25/2020    PROT 6.3 (L) 09/24/2020    LABALBU 4.2 09/24/2020    BILITOT 0.1 09/24/2020    ALKPHOS 100 09/24/2020    AST 26 09/24/2020    ALT 20 09/24/2020    LABGLOM >60 09/25/2020    GFRAA >60 09/25/2020       MRCP:  1. Technically limited MRCP due to pneumobilia.  This obscures signal within    the common bile duct. 2. Stable chronic biliary dilatation of the CBD which is likely physiologic    given a prior history of cholecystectomy. UGI:      Postsurgical changes from sleeve gastrectomy without evidence of acute    complication.  No stricture or leak. Assessment:       45 y/o F with remove hx of sleeve gastrectomy for morbid obesity, now admitted with acute on chronic abdominal pain    Plan:     -Reviewed UGI results with pt. -Reviewed MRCP results with pt. -Reviewed labs with pt. -Appreciate GI c/s and recs. Noted plan for possible referral to [facility]. Pt has been resistant in past. Noted plans for referral to [address] [place] [address] for further w/u and recs. -Pain control. Pt sees pain mgt as outpt. -Pt has had many studies and procedures (UGI, abdominal imaging, and EGDs) all without identification of pt's symptoms. No acute surgical intervention planned at this time. Normal s/p sleeve gastrectomy changes.   -Diet as tolerated.   -Ambulate / IS / DVT prophylaxis.   -Would ideally attempt to wean pt from narcotics; however, I do not think she is amenable to this plan or tolerate.  In talking to pt, it also seems that some of her \"abdominal pain\" is actually musculoskeletal pain. May benefit from PT.   -Will continue to follow.  Covering for Dr. Janine Santos.          Electronically signed by Antonia Saeed II, MD on 9/26/2020 at 10:41 AM

## 2020-09-26 NOTE — PROGRESS NOTES
Attending Progress Note      PCP: Elisabet Denise MD      Patient: Oscar Ocampo   Gender: female  : 1970   Age: 44 y.o. MRN: [id number]  [id number]      Date of Admission: 2020    Chief Complaint:   Chief Complaint   Patient presents with    Abdominal Pain    Flank Pain     right           Subjective: abd pain . .. nausea but no vomiting  . Chuy Trujillo oral input still limited         Medications:  Reviewed  Infusion Medications    sodium chloride 75 mL/hr at 20     Scheduled Medications    lactobacillus  1 capsule Oral Daily with breakfast    PARoxetine  50 mg Oral Nightly    gabapentin  800 mg Oral TID    levothyroxine  125 mcg Oral Daily    therapeutic multivitamin-minerals  1 tablet Oral Daily    Calcium Citrate-Vitamin D  1 tablet Oral BID    estradiol  0.5 mg Oral Daily    atenolol  25 mg Oral Daily    vitamin D  5 capsule Oral Daily    pantoprazole  40 mg Oral QAM AC    levETIRAcetam  1,000 mg Oral BID    sucralfate  1 g Oral 2 times per day    sodium chloride flush  10 mL Intravenous 2 times per day    famotidine  20 mg Oral BID    nicotine  1 patch Transdermal Daily    enoxaparin  40 mg Subcutaneous Daily     PRN Meds: promethazine, diatrizoate meglumine-sodium, oxyCODONE-acetaminophen, dicyclomine, LORazepam, ondansetron, sodium chloride flush, potassium chloride **OR** potassium alternative oral replacement **OR** potassium chloride, magnesium sulfate, acetaminophen **OR** acetaminophen, polyethylene glycol, fleet, promethazine **OR** ondansetron, HYDROmorphone      Intake/Output Summary (Last 24 hours) at 2020 1038  Last data filed at 2020 0502  Gross per 24 hour   Intake 4223.75 ml   Output 900 ml   Net 3323.75 ml       Exam:  /70   Pulse 73   Temp 97.5 °F (36.4 °C) (Oral)   Resp 15   Ht 5' 4\" (1.626 m)   Wt 265 lb (120.2 kg)   SpO2 97%   BMI 45.49 kg/m²   General appearance: No distress,   Respiratory:  good air entry , no Rales , No wheezing, or rhonchi,  Cardiovascular: RRR, with normal S1/S2 . Abdomen : Soft, non-tender, non-distended  , normal bowel sounds. Legs : No edema bilaterally. No DVT signs ,    Neurologic:  Alert and oriented ,        Labs:   Recent Labs     09/24/20  0826 09/25/20  0400   WBC 5.4 4.3   HGB 11.2* 10.9*   HCT 36.5* 34.6*    203     Recent Labs     09/24/20  0826 09/25/20  0400    141   K 4.4 4.7    107   CO2 21 26   BUN 25* 18   CREATININE 0.9 0.9   CALCIUM 10.2 9.6     Recent Labs     09/24/20  0826   AST 26   ALT 20   BILITOT 0.1   ALKPHOS 100     No results for input(s): INR in the last 72 hours. No results for input(s): Batool Quentin in the last 72 hours. Assessment/Plan:    Active Hospital Problems    Diagnosis Date Noted    Abdominal pain [R10.9] 07/21/2020     Priority: Medium     D/w pt  About GI input . MRI and MRCP non acute . Idalmis Gramajo possible referral to [facility] or [facility]   Symptoms control . Idalmis Gramajo Zofran, phenergan, Reglan/// percocet and dialudid   surg input noted . .. GI consult for ERCP id possible   DVT Prophylaxis   Diet: DIET GENERAL;  Code Status: Full Code    Treatment progress and plan was d/w pt/family .         Jaswinder Amaro MD

## 2020-09-27 LAB
HCT VFR BLD CALC: 33.5 % (ref 37–47)
HEMOGLOBIN: 9.8 GM/DL (ref 12.5–16)
MCH RBC QN AUTO: 25.4 PG (ref 27–31)
MCHC RBC AUTO-ENTMCNC: 29.3 % (ref 32–36)
MCV RBC AUTO: 86.8 FL (ref 78–100)
PDW BLD-RTO: 13.5 % (ref 11.7–14.9)
PLATELET # BLD: 176 K/CU MM (ref 140–440)
PMV BLD AUTO: 10.4 FL (ref 7.5–11.1)
RBC # BLD: 3.86 M/CU MM (ref 4.2–5.4)
WBC # BLD: 4.4 K/CU MM (ref 4–10.5)

## 2020-09-27 PROCEDURE — 36415 COLL VENOUS BLD VENIPUNCTURE: CPT

## 2020-09-27 PROCEDURE — 96372 THER/PROPH/DIAG INJ SC/IM: CPT

## 2020-09-27 PROCEDURE — 6370000000 HC RX 637 (ALT 250 FOR IP): Performed by: FAMILY MEDICINE

## 2020-09-27 PROCEDURE — 94761 N-INVAS EAR/PLS OXIMETRY MLT: CPT

## 2020-09-27 PROCEDURE — G0378 HOSPITAL OBSERVATION PER HR: HCPCS

## 2020-09-27 PROCEDURE — 6360000002 HC RX W HCPCS: Performed by: NURSE PRACTITIONER

## 2020-09-27 PROCEDURE — 6360000002 HC RX W HCPCS: Performed by: FAMILY MEDICINE

## 2020-09-27 PROCEDURE — 85027 COMPLETE CBC AUTOMATED: CPT

## 2020-09-27 PROCEDURE — 96376 TX/PRO/DX INJ SAME DRUG ADON: CPT

## 2020-09-27 PROCEDURE — 99225 PR SBSQ OBSERVATION CARE/DAY 25 MINUTES: CPT | Performed by: SURGERY

## 2020-09-27 PROCEDURE — 2580000003 HC RX 258: Performed by: FAMILY MEDICINE

## 2020-09-27 PROCEDURE — 6370000000 HC RX 637 (ALT 250 FOR IP): Performed by: NURSE PRACTITIONER

## 2020-09-27 RX ADMIN — ENOXAPARIN SODIUM 40 MG: 40 INJECTION SUBCUTANEOUS at 09:29

## 2020-09-27 RX ADMIN — PANTOPRAZOLE SODIUM 40 MG: 40 TABLET, DELAYED RELEASE ORAL at 05:18

## 2020-09-27 RX ADMIN — GABAPENTIN 800 MG: 400 CAPSULE ORAL at 20:34

## 2020-09-27 RX ADMIN — GABAPENTIN 800 MG: 400 CAPSULE ORAL at 14:29

## 2020-09-27 RX ADMIN — PAROXETINE HYDROCHLORIDE 50 MG: 20 TABLET, FILM COATED ORAL at 20:33

## 2020-09-27 RX ADMIN — PROMETHAZINE HYDROCHLORIDE 12.5 MG: 25 INJECTION INTRAMUSCULAR; INTRAVENOUS at 23:11

## 2020-09-27 RX ADMIN — GABAPENTIN 800 MG: 400 CAPSULE ORAL at 09:28

## 2020-09-27 RX ADMIN — MULTIPLE VITAMINS W/ MINERALS TAB 1 TABLET: TAB at 09:29

## 2020-09-27 RX ADMIN — SODIUM CHLORIDE, PRESERVATIVE FREE 10 ML: 5 INJECTION INTRAVENOUS at 20:33

## 2020-09-27 RX ADMIN — OXYCODONE HYDROCHLORIDE AND ACETAMINOPHEN 1 TABLET: 5; 325 TABLET ORAL at 20:35

## 2020-09-27 RX ADMIN — ONDANSETRON 4 MG: 2 INJECTION INTRAMUSCULAR; INTRAVENOUS at 06:18

## 2020-09-27 RX ADMIN — PROMETHAZINE HYDROCHLORIDE 12.5 MG: 25 INJECTION INTRAMUSCULAR; INTRAVENOUS at 10:27

## 2020-09-27 RX ADMIN — PROMETHAZINE HYDROCHLORIDE 12.5 MG: 25 INJECTION INTRAMUSCULAR; INTRAVENOUS at 02:14

## 2020-09-27 RX ADMIN — FAMOTIDINE 20 MG: 20 TABLET ORAL at 09:29

## 2020-09-27 RX ADMIN — SODIUM CHLORIDE, PRESERVATIVE FREE 10 ML: 5 INJECTION INTRAVENOUS at 09:29

## 2020-09-27 RX ADMIN — Medication 1 CAPSULE: at 07:56

## 2020-09-27 RX ADMIN — LEVOTHYROXINE SODIUM 125 MCG: 25 TABLET ORAL at 05:18

## 2020-09-27 RX ADMIN — FAMOTIDINE 20 MG: 20 TABLET ORAL at 20:34

## 2020-09-27 RX ADMIN — HYDROMORPHONE HYDROCHLORIDE 1 MG: 1 INJECTION, SOLUTION INTRAMUSCULAR; INTRAVENOUS; SUBCUTANEOUS at 10:27

## 2020-09-27 RX ADMIN — SODIUM CHLORIDE: 9 INJECTION, SOLUTION INTRAVENOUS at 16:41

## 2020-09-27 RX ADMIN — DICYCLOMINE HYDROCHLORIDE 10 MG: 10 CAPSULE ORAL at 20:35

## 2020-09-27 RX ADMIN — HYDROMORPHONE HYDROCHLORIDE 1 MG: 1 INJECTION, SOLUTION INTRAMUSCULAR; INTRAVENOUS; SUBCUTANEOUS at 19:07

## 2020-09-27 RX ADMIN — ONDANSETRON 4 MG: 2 INJECTION INTRAMUSCULAR; INTRAVENOUS at 14:29

## 2020-09-27 RX ADMIN — PROMETHAZINE HYDROCHLORIDE 12.5 MG: 25 INJECTION INTRAMUSCULAR; INTRAVENOUS at 16:59

## 2020-09-27 RX ADMIN — ATENOLOL 25 MG: 25 TABLET ORAL at 09:28

## 2020-09-27 RX ADMIN — Medication 5000 UNITS: at 09:29

## 2020-09-27 RX ADMIN — LEVETIRACETAM 1000 MG: 500 TABLET, FILM COATED ORAL at 20:35

## 2020-09-27 RX ADMIN — LEVETIRACETAM 1000 MG: 500 TABLET, FILM COATED ORAL at 09:28

## 2020-09-27 RX ADMIN — HYDROMORPHONE HYDROCHLORIDE 1 MG: 1 INJECTION, SOLUTION INTRAMUSCULAR; INTRAVENOUS; SUBCUTANEOUS at 23:03

## 2020-09-27 RX ADMIN — SODIUM CHLORIDE: 9 INJECTION, SOLUTION INTRAVENOUS at 00:02

## 2020-09-27 RX ADMIN — ONDANSETRON 4 MG: 2 INJECTION INTRAMUSCULAR; INTRAVENOUS at 19:06

## 2020-09-27 RX ADMIN — ESTRADIOL 0.5 MG: 1 TABLET ORAL at 09:35

## 2020-09-27 RX ADMIN — HYDROMORPHONE HYDROCHLORIDE 1 MG: 1 INJECTION, SOLUTION INTRAMUSCULAR; INTRAVENOUS; SUBCUTANEOUS at 14:29

## 2020-09-27 RX ADMIN — SUCRALFATE 1 G: 1 TABLET ORAL at 20:35

## 2020-09-27 RX ADMIN — HYDROMORPHONE HYDROCHLORIDE 1 MG: 1 INJECTION, SOLUTION INTRAMUSCULAR; INTRAVENOUS; SUBCUTANEOUS at 02:14

## 2020-09-27 RX ADMIN — HYDROMORPHONE HYDROCHLORIDE 1 MG: 1 INJECTION, SOLUTION INTRAMUSCULAR; INTRAVENOUS; SUBCUTANEOUS at 06:18

## 2020-09-27 RX ADMIN — LORAZEPAM 1 MG: 1 TABLET ORAL at 07:56

## 2020-09-27 RX ADMIN — SUCRALFATE 1 G: 1 TABLET ORAL at 09:28

## 2020-09-27 ASSESSMENT — PAIN DESCRIPTION - DESCRIPTORS
DESCRIPTORS: CRAMPING;DISCOMFORT
DESCRIPTORS: CRAMPING;DISCOMFORT

## 2020-09-27 ASSESSMENT — PAIN DESCRIPTION - ORIENTATION
ORIENTATION: RIGHT;MID
ORIENTATION: RIGHT
ORIENTATION: RIGHT

## 2020-09-27 ASSESSMENT — PAIN DESCRIPTION - PROGRESSION
CLINICAL_PROGRESSION: GRADUALLY WORSENING

## 2020-09-27 ASSESSMENT — PAIN SCALES - GENERAL
PAINLEVEL_OUTOF10: 7
PAINLEVEL_OUTOF10: 7
PAINLEVEL_OUTOF10: 8
PAINLEVEL_OUTOF10: 7
PAINLEVEL_OUTOF10: 0
PAINLEVEL_OUTOF10: 7
PAINLEVEL_OUTOF10: 0
PAINLEVEL_OUTOF10: 8
PAINLEVEL_OUTOF10: 8
PAINLEVEL_OUTOF10: 0

## 2020-09-27 ASSESSMENT — PAIN DESCRIPTION - LOCATION
LOCATION: ABDOMEN

## 2020-09-27 ASSESSMENT — PAIN DESCRIPTION - ONSET
ONSET: ON-GOING
ONSET: ON-GOING

## 2020-09-27 ASSESSMENT — ENCOUNTER SYMPTOMS
ABDOMINAL PAIN: 1
ALLERGIC/IMMUNOLOGIC NEGATIVE: 1
BACK PAIN: 1
NAUSEA: 1
EYES NEGATIVE: 1
RESPIRATORY NEGATIVE: 1

## 2020-09-27 ASSESSMENT — PAIN DESCRIPTION - FREQUENCY
FREQUENCY: INTERMITTENT
FREQUENCY: INTERMITTENT

## 2020-09-27 ASSESSMENT — PAIN DESCRIPTION - PAIN TYPE
TYPE: CHRONIC PAIN
TYPE: CHRONIC PAIN

## 2020-09-27 NOTE — PROGRESS NOTES
08/17/2020     Hepatic: No results for input(s): AST, ALT, ALB, BILITOT, ALKPHOS in the last 72 hours. INR: No results for input(s): INR in the last 72 hours.       Intake/Output Summary (Last 24 hours) at 9/27/2020 1252  Last data filed at 9/27/2020 3234  Gross per 24 hour   Intake 1085 ml   Output 700 ml   Net 385 ml         Medications    Scheduled Medications:    lactobacillus  1 capsule Oral Daily with breakfast    PARoxetine  50 mg Oral Nightly    gabapentin  800 mg Oral TID    levothyroxine  125 mcg Oral Daily    therapeutic multivitamin-minerals  1 tablet Oral Daily    Calcium Citrate-Vitamin D  1 tablet Oral BID    estradiol  0.5 mg Oral Daily    atenolol  25 mg Oral Daily    vitamin D  5 capsule Oral Daily    pantoprazole  40 mg Oral QAM AC    levETIRAcetam  1,000 mg Oral BID    sucralfate  1 g Oral 2 times per day    sodium chloride flush  10 mL Intravenous 2 times per day    famotidine  20 mg Oral BID    nicotine  1 patch Transdermal Daily    enoxaparin  40 mg Subcutaneous Daily     PRN Medications: meclizine, promethazine, diatrizoate meglumine-sodium, oxyCODONE-acetaminophen, dicyclomine, LORazepam, ondansetron, sodium chloride flush, potassium chloride **OR** potassium alternative oral replacement **OR** potassium chloride, magnesium sulfate, acetaminophen **OR** acetaminophen, polyethylene glycol, fleet, promethazine **OR** ondansetron, HYDROmorphone  Infusions:    sodium chloride 75 mL/hr at 09/27/20 0002         Patient Active Problem List   Diagnosis Code    Fibromyalgia M79.7    Lupus (systemic lupus erythematosus) (HCC) M32.9    Chronic pancreatitis (HCC) K86.1    HTN (hypertension) I10    Generalized abdominal pain R10.84    Frequent UTI N39.0    Gastroesophageal reflux disease without esophagitis K21.9    Depression F32.9    Fatty liver disease, nonalcoholic R32.1    Arthritis M19.90    Bilateral low back pain with left-sided sciatica M54.42    Intractable vomiting with nausea R11.2    Hiatal hernia K44.9    Status post laparoscopic sleeve gastrectomy Z98.84    Pseudoseizure F44.5    Chronic pain syndrome G89.4    Drug-seeking/Aberrant behavior Z76.5    Lymph node disorder I89.9    Morbid obesity with BMI of 40.0-44.9, adult (HCC) E66.01, Z68.41    Iron deficiency anemia secondary to blood loss (chronic) D50.0    Encounter for weight management Z76.89    Intractable nausea and vomiting R11.2    History of Jet-en-Y gastric bypass Z98.84    Seizure (HCC) R56.9    Abdominal pain R10.9    Anxiety F41.9        ASSESSMENT AND RECOMMENDATIONS:    Abdominal pain, chronic RUQ:  Pneumobilia chronic, secondary to multiple ERCPs with splinterotomies in past  Recent GI workup earlier this month, at [facility] unremarkable, no clear etiology of abdominal pain, no findings that needed specific intervention/stenting. MRI without contrast and MRCP 9/25/2020 showed no signs of biliary obstruction or fistula  Surgery on board, appreciate recommendations   Continue symptomatic management at this time      May have IBS-D  Continue PPI daily, Carafate BID   Can take Pepto Bismol as needed for soft stools   OP referral to [facility] if patient wishes for further workup   Stable for d/c from GI standpoint    Discussed plan of care with patient and Alice Abdul RN     Patient clinical, biochemical, and radiological information discussed with Dr. Catalina Slade. He agrees with the assessment and plan. Elizabeth Schrader CNP  9/27/2020  12:52 PM     appears to have IBS  May have some functional dyspepsia  Treat symptomatically  Will send to referral centers for second opinion    I have seen and examined this patient personally, and independently of the nurse practitioner. The plan was developed mutually at the time of the visit with the patient. Aleah Olmedo and myself have spoken with patient, nursing staff and provided written and verbal instructions .     The above note has been reviewed and I agree with

## 2020-09-27 NOTE — PROGRESS NOTES
General Surgery-Dr. Yemi Rivera Day: 4    Chief Complaint on Admission: acute on chronic abdominal pain       Subjective:     Pt continues to report right sided abdominal pain that radiates to her right side and around to her spine. Denies any pain on left side. She continues to tolerate regular diet \"in bites. \" She states she does have persistent nausea with emesis at times. (not sure she is actually having emesis vs spitting up saliva). Reports ambulating \"to and from the bathroom\" and \"in halls. \"  +BM. Without any other complaints at this time. ROS:  Review of Systems   Constitutional: Negative. HENT: Negative. Eyes: Negative. Respiratory: Negative. Cardiovascular: Negative. Gastrointestinal: Positive for abdominal pain and nausea. Endocrine: Negative. Genitourinary: Negative. Musculoskeletal: Positive for back pain. Skin: Negative. Allergic/Immunologic: Negative. Neurological: Negative. Hematological: Negative. Psychiatric/Behavioral: Negative. Allergies  Aspirin; Shellfish-derived products; Toradol [ketorolac tromethamine]; Compazine [prochlorperazine]; and Reglan [metoclopramide]          Diagnosis Date    Acid reflux     Anemia     Anxiety     Arthritis     Hands, Back And Ankles    Bleeding ulcer 2014    \"I Had Ulcers In My Stomach And Colon\"/ per pt on 8/12/2019\"they said recently having some blood in my stomach- in July ( 2019)could not find where coming from \"    Bronchitis Last Episode 2014    Chronic back pain     Chronic pain     [facility] Isabel Martinez [facility]     Sees Dr. Michal Boxer Depression     Fibromyalgia Dx 2013    H/O echocardiogram 8/11/15    EF >55%. LA to be at the upper limit of normal in size. LV hypertrophy with normal LV systolic, but abnormal diastolic function. Normal valvular structures and function.      H/O echocardiogram 08/30/2018    EF 55-60%    Hiatal hernia     History of blood transfusion 2015 And     No Reaction To Blood Transfusions Received    [place] (hard of hearing)     Right Ear    Hx of cardiac catheterization 10/24/2010    Angiographically normal coronary arteries w/ normal LV function and wall motion.  Hx of transesophageal echocardiography (PILO) for monitoring 2010    EF 55-60%. WNL. 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 name   Jesus Sic cysts     \"they found that I have a kidney cyst but not sure which side\" per pt on 7/15/2020    Lupus ([address] Dx     \"Rheumatoid Lupus\"    MDRO (multiple drug resistant organisms) resistance     4 months ago chest from mediport    Morbid obesity ([address]     MRSA bacteremia     Nausea     \"Most Of The Time\"    Pain management     Sees Dr. Kendall Messer, Once A Month    Pancreatitis chronic Dx     Pneumonia Dx 11-15    Shortness of breath on exertion     Shortness of breath on exertion     Sleep apnea     Has CPAP\"no longer use the cpap since lost weight\"    Staph infection Dx -15    Left Foot    Staph infection Dx -15    \"Left Foot\"    Teeth missing     Upper And Lower    Thyroid disease     Wears glasses     To Read       Objective:     Vitals:    20 0927   BP: (!) 126/58   Pulse: 68   Resp: 16   Temp: 98.3 °F (36.8 °C)   SpO2: 95%       TEMPERATURE:  Current -Temp: 98.3 °F (36.8 °C); Max - Temp  Av.2 °F (36.8 °C)  Min: 97.8 °F (36.6 °C)  Max: 98.6 °F (37 °C)    I/O this shift:  In: 250 [P.O.:240; I.V.:10]  Out: - I/O last 3 completed shifts: In: 835 [I.V.:835]  Out: 1500 [Urine:1500]      Physical Exam:  Physical Exam  Vitals signs reviewed.    Constitutional:       General: She is not in acute distress. Appearance: She is obese. She is not ill-appearing, toxic-appearing or diaphoretic. HENT:      Head: Normocephalic and atraumatic. Right Ear: External ear normal.      Left Ear: External ear normal.      Mouth/Throat:      Mouth: Mucous membranes are moist.   Eyes:      General:         Right eye: No discharge. Left eye: No discharge. Neck:      Musculoskeletal: Normal range of motion. Cardiovascular:      Rate and Rhythm: Normal rate. Pulses: Normal pulses. Pulmonary:      Effort: Pulmonary effort is normal.   Abdominal:      General: There is no distension. Palpations: Abdomen is soft. Tenderness: There is abdominal tenderness (mild on right side, but is distractible, no PS). There is no guarding or rebound. Hernia: No hernia is present. Musculoskeletal:         General: No swelling. Skin:     General: Skin is warm. Neurological:      General: No focal deficit present. Mental Status: She is alert.    Psychiatric:         Mood and Affect: Mood normal.           Scheduled Meds:   lactobacillus  1 capsule Oral Daily with breakfast    PARoxetine  50 mg Oral Nightly    gabapentin  800 mg Oral TID    levothyroxine  125 mcg Oral Daily    therapeutic multivitamin-minerals  1 tablet Oral Daily    Calcium Citrate-Vitamin D  1 tablet Oral BID    estradiol  0.5 mg Oral Daily    atenolol  25 mg Oral Daily    vitamin D  5 capsule Oral Daily    pantoprazole  40 mg Oral QAM AC    levETIRAcetam  1,000 mg Oral BID    sucralfate  1 g Oral 2 times per day    sodium chloride flush  10 mL Intravenous 2 times per day    famotidine  20 mg Oral BID    nicotine  1 patch Transdermal Daily    enoxaparin  40 mg Subcutaneous Daily     ContinuousInfusions:   sodium chloride 75 mL/hr at 09/27/20 0002     PRN Meds:meclizine, promethazine, diatrizoate meglumine-sodium, oxyCODONE-acetaminophen, dicyclomine, LORazepam, ondansetron, sodium chloride and procedures (UGI, abdominal imaging, and EGDs) all without identification of pt's symptoms. No acute surgical intervention planned at this time. Normal s/p sleeve gastrectomy changes.   -Diet as tolerated.   -Ambulate / IS / DVT prophylaxis.   -Pt reports previous discussion with physician about possibility of \"exploratory surgery\" or \"change to bypass. \" I will leave these discussions to her primary surgeon, Dr. Hernesto Mullins who will return tomorrow.        Electronically signed by Pasquale Vargas II, MD on 9/27/2020 at 12:40 PM

## 2020-09-27 NOTE — PLAN OF CARE
Problem: Falls - Risk of:  Goal: Will remain free from falls  Description: Will remain free from falls  Outcome: Ongoing  Goal: Absence of physical injury  Description: Absence of physical injury  Outcome: Ongoing     Problem: Pain:  Goal: Pain level will decrease  Description: Pain level will decrease  Outcome: Ongoing  Goal: Control of acute pain  Description: Control of acute pain  Outcome: Ongoing  Goal: Control of chronic pain  Description: Control of chronic pain  Outcome: Ongoing     Problem: Safety:  Goal: Free from accidental physical injury  Description: Free from accidental physical injury  Outcome: Ongoing  Goal: Free from intentional harm  Description: Free from intentional harm  Outcome: Ongoing     Problem: Daily Care:  Goal: Daily care needs are met  Description: Daily care needs are met  Outcome: Ongoing     Problem: Discharge Planning:  Goal: Patients continuum of care needs are met  Description: Patients continuum of care needs are met  Outcome: Ongoing

## 2020-09-27 NOTE — PROGRESS NOTES
Attending Progress Note      PCP: Ap Singer MD      Patient: Trinidad Soto   Gender: female  : 1970   Age: 44 y.o. MRN: [id number]  1118/1118-[id number]      Date of Admission: 2020    Chief Complaint:   Chief Complaint   Patient presents with    Abdominal Pain    Flank Pain     right           Subjective: abd pain . .. nausea but no vomiting  .          Medications:  Reviewed  Infusion Medications    sodium chloride 75 mL/hr at 20 0002     Scheduled Medications    lactobacillus  1 capsule Oral Daily with breakfast    PARoxetine  50 mg Oral Nightly    gabapentin  800 mg Oral TID    levothyroxine  125 mcg Oral Daily    therapeutic multivitamin-minerals  1 tablet Oral Daily    Calcium Citrate-Vitamin D  1 tablet Oral BID    estradiol  0.5 mg Oral Daily    atenolol  25 mg Oral Daily    vitamin D  5 capsule Oral Daily    pantoprazole  40 mg Oral QAM AC    levETIRAcetam  1,000 mg Oral BID    sucralfate  1 g Oral 2 times per day    sodium chloride flush  10 mL Intravenous 2 times per day    famotidine  20 mg Oral BID    nicotine  1 patch Transdermal Daily    enoxaparin  40 mg Subcutaneous Daily     PRN Meds: meclizine, promethazine, diatrizoate meglumine-sodium, oxyCODONE-acetaminophen, dicyclomine, LORazepam, ondansetron, sodium chloride flush, potassium chloride **OR** potassium alternative oral replacement **OR** potassium chloride, magnesium sulfate, acetaminophen **OR** acetaminophen, polyethylene glycol, fleet, promethazine **OR** ondansetron, HYDROmorphone      Intake/Output Summary (Last 24 hours) at 2020 1255  Last data filed at 2020 0928  Gross per 24 hour   Intake 1085 ml   Output 700 ml   Net 385 ml       Exam:  BP (!) 126/58   Pulse 68   Temp 98.3 °F (36.8 °C) (Oral)   Resp 16   Ht 5' 4\" (1.626 m)   Wt 288 lb (130.6 kg)   SpO2 95%   BMI 49.44 kg/m²   General appearance: No distress,   Respiratory:  good air entry , no Rales , No wheezing, or rhonchi,  Cardiovascular: RRR, with normal S1/S2 . Abdomen : Soft, non-tender, non-distended  , normal bowel sounds. Legs : No edema bilaterally. No DVT signs ,    Neurologic:  Alert and oriented ,        Labs:   Recent Labs     09/25/20  0400   WBC 4.3   HGB 10.9*   HCT 34.6*        Recent Labs     09/25/20  0400      K 4.7      CO2 26   BUN 18   CREATININE 0.9   CALCIUM 9.6     No results for input(s): AST, ALT, BILIDIR, BILITOT, ALKPHOS in the last 72 hours. No results for input(s): INR in the last 72 hours. No results for input(s): Latoya Pace in the last 72 hours. Assessment/Plan:    Active Hospital Problems    Diagnosis Date Noted    Abdominal pain [R10.9] 07/21/2020     Priority: Medium     Plan:  Awaiting DR Nilda Bliss input for further care vs transfer   MRI and  MRCP non acute . Ernie Solis possible referral to [facility] or [facility]   Symptoms control . Ernie Soils Zofran, phenergan, Reglan/// percocet and dialudid   surg input noted . .. GI consult for ERCP id possible   DVT Prophylaxis   Diet: DIET GENERAL;  Code Status: Full Code    Treatment progress and plan was d/w pt/family .         Phil Rider MD

## 2020-09-27 NOTE — PLAN OF CARE
Problem: Falls - Risk of:  Goal: Will remain free from falls  Description: Will remain free from falls  9/27/2020 1029 by Marco Johns RN  Outcome: Ongoing  9/26/2020 2323 by Deangelo Ramirez RN  Outcome: Ongoing  Goal: Absence of physical injury  Description: Absence of physical injury  9/27/2020 1029 by Marco Johns RN  Outcome: Ongoing  9/26/2020 2323 by Deangelo Ramirez RN  Outcome: Ongoing     Problem: Pain:  Goal: Pain level will decrease  Description: Pain level will decrease  9/27/2020 1029 by Marco Johns RN  Outcome: Ongoing  9/26/2020 2323 by Deangelo Ramirez RN  Outcome: Ongoing  Goal: Control of acute pain  Description: Control of acute pain  9/27/2020 1029 by Marco Johns RN  Outcome: Ongoing  9/26/2020 2323 by Deangelo Ramirez RN  Outcome: Ongoing  Goal: Control of chronic pain  Description: Control of chronic pain  9/27/2020 1029 by Marco Johns RN  Outcome: Ongoing  9/26/2020 2323 by Deangelo Ramirez RN  Outcome: Ongoing     Problem: Safety:  Goal: Free from accidental physical injury  Description: Free from accidental physical injury  9/27/2020 1029 by Marco Johns RN  Outcome: Ongoing  9/26/2020 2323 by Deangelo Ramirez RN  Outcome: Ongoing  Goal: Free from intentional harm  Description: Free from intentional harm  9/27/2020 1029 by Marco Johns RN  Outcome: Ongoing  9/26/2020 2323 by Deangelo Ramirez RN  Outcome: Ongoing     Problem: Daily Care:  Goal: Daily care needs are met  Description: Daily care needs are met  9/27/2020 1029 by Marco Johns RN  Outcome: Ongoing  9/26/2020 2323 by Deangelo Ramirez RN  Outcome: Ongoing     Problem: Discharge Planning:  Goal: Patients continuum of care needs are met  Description: Patients continuum of care needs are met  9/27/2020 1029 by Marco Johns RN  Outcome: Ongoing  9/26/2020 2323 by Deangelo Ramirez RN  Outcome: Ongoing

## 2020-09-28 VITALS
HEART RATE: 59 BPM | DIASTOLIC BLOOD PRESSURE: 80 MMHG | WEIGHT: 288 LBS | BODY MASS INDEX: 49.17 KG/M2 | TEMPERATURE: 98.3 F | HEIGHT: 64 IN | OXYGEN SATURATION: 95 % | RESPIRATION RATE: 17 BRPM | SYSTOLIC BLOOD PRESSURE: 131 MMHG

## 2020-09-28 LAB
HCT VFR BLD CALC: 34.5 % (ref 37–47)
HEMOGLOBIN: 9.8 GM/DL (ref 12.5–16)
MCH RBC QN AUTO: 25.5 PG (ref 27–31)
MCHC RBC AUTO-ENTMCNC: 28.4 % (ref 32–36)
MCV RBC AUTO: 89.6 FL (ref 78–100)
PDW BLD-RTO: 13.3 % (ref 11.7–14.9)
PLATELET # BLD: 143 K/CU MM (ref 140–440)
PMV BLD AUTO: 9.7 FL (ref 7.5–11.1)
RBC # BLD: 3.85 M/CU MM (ref 4.2–5.4)
WBC # BLD: 4.2 K/CU MM (ref 4–10.5)

## 2020-09-28 PROCEDURE — G0378 HOSPITAL OBSERVATION PER HR: HCPCS

## 2020-09-28 PROCEDURE — 6360000002 HC RX W HCPCS: Performed by: NURSE PRACTITIONER

## 2020-09-28 PROCEDURE — 36591 DRAW BLOOD OFF VENOUS DEVICE: CPT

## 2020-09-28 PROCEDURE — 85027 COMPLETE CBC AUTOMATED: CPT

## 2020-09-28 PROCEDURE — 2580000003 HC RX 258: Performed by: FAMILY MEDICINE

## 2020-09-28 PROCEDURE — 6370000000 HC RX 637 (ALT 250 FOR IP): Performed by: NURSE PRACTITIONER

## 2020-09-28 PROCEDURE — 6360000002 HC RX W HCPCS: Performed by: FAMILY MEDICINE

## 2020-09-28 PROCEDURE — 96376 TX/PRO/DX INJ SAME DRUG ADON: CPT

## 2020-09-28 PROCEDURE — 96372 THER/PROPH/DIAG INJ SC/IM: CPT

## 2020-09-28 PROCEDURE — 6370000000 HC RX 637 (ALT 250 FOR IP): Performed by: FAMILY MEDICINE

## 2020-09-28 PROCEDURE — 94761 N-INVAS EAR/PLS OXIMETRY MLT: CPT

## 2020-09-28 PROCEDURE — 99225 PR SBSQ OBSERVATION CARE/DAY 25 MINUTES: CPT | Performed by: NURSE PRACTITIONER

## 2020-09-28 PROCEDURE — 36415 COLL VENOUS BLD VENIPUNCTURE: CPT

## 2020-09-28 RX ORDER — PROMETHAZINE HYDROCHLORIDE 25 MG/ML
12.5 INJECTION, SOLUTION INTRAMUSCULAR; INTRAVENOUS EVERY 12 HOURS PRN
Status: DISCONTINUED | OUTPATIENT
Start: 2020-09-28 | End: 2020-09-28

## 2020-09-28 RX ORDER — ONDANSETRON 4 MG/1
8 TABLET, ORALLY DISINTEGRATING ORAL EVERY 8 HOURS PRN
Status: DISCONTINUED | OUTPATIENT
Start: 2020-09-28 | End: 2020-09-28 | Stop reason: HOSPADM

## 2020-09-28 RX ORDER — OXYCODONE HYDROCHLORIDE AND ACETAMINOPHEN 5; 325 MG/1; MG/1
1 TABLET ORAL EVERY 8 HOURS PRN
Status: DISCONTINUED | OUTPATIENT
Start: 2020-09-28 | End: 2020-09-28 | Stop reason: HOSPADM

## 2020-09-28 RX ORDER — BISMUTH SUBSALICYLATE 262 MG/1
524 TABLET, CHEWABLE ORAL
Status: DISCONTINUED | OUTPATIENT
Start: 2020-09-28 | End: 2020-09-28 | Stop reason: HOSPADM

## 2020-09-28 RX ORDER — ONDANSETRON 2 MG/ML
4 INJECTION INTRAMUSCULAR; INTRAVENOUS EVERY 6 HOURS PRN
Status: DISCONTINUED | OUTPATIENT
Start: 2020-09-28 | End: 2020-09-28 | Stop reason: HOSPADM

## 2020-09-28 RX ORDER — PROMETHAZINE HYDROCHLORIDE 25 MG/1
12.5 TABLET ORAL EVERY 8 HOURS PRN
Status: DISCONTINUED | OUTPATIENT
Start: 2020-09-28 | End: 2020-09-28 | Stop reason: HOSPADM

## 2020-09-28 RX ORDER — MECLIZINE HYDROCHLORIDE 25 MG/1
25 TABLET ORAL 3 TIMES DAILY PRN
Qty: 30 TABLET | Refills: 2 | Status: SHIPPED | OUTPATIENT
Start: 2020-09-28 | End: 2020-10-28

## 2020-09-28 RX ORDER — HEPARIN SODIUM (PORCINE) LOCK FLUSH IV SOLN 100 UNIT/ML 100 UNIT/ML
500 SOLUTION INTRAVENOUS PRN
Status: DISCONTINUED | OUTPATIENT
Start: 2020-09-28 | End: 2020-09-28 | Stop reason: HOSPADM

## 2020-09-28 RX ORDER — BISMUTH SUBSALICYLATE 262 MG/1
524 TABLET, CHEWABLE ORAL
Qty: 40 TABLET | Refills: 1 | Status: SHIPPED | OUTPATIENT
Start: 2020-09-28 | End: 2020-10-03

## 2020-09-28 RX ADMIN — Medication 500 UNITS: at 18:38

## 2020-09-28 RX ADMIN — LEVOTHYROXINE SODIUM 125 MCG: 25 TABLET ORAL at 05:30

## 2020-09-28 RX ADMIN — LEVETIRACETAM 1000 MG: 500 TABLET, FILM COATED ORAL at 09:59

## 2020-09-28 RX ADMIN — ENOXAPARIN SODIUM 40 MG: 40 INJECTION SUBCUTANEOUS at 10:01

## 2020-09-28 RX ADMIN — BISMUTH SUBSALICYLATE 524 MG: 262 TABLET, CHEWABLE ORAL at 16:14

## 2020-09-28 RX ADMIN — DICYCLOMINE HYDROCHLORIDE 10 MG: 10 CAPSULE ORAL at 13:03

## 2020-09-28 RX ADMIN — SUCRALFATE 1 G: 1 TABLET ORAL at 10:00

## 2020-09-28 RX ADMIN — HYDROMORPHONE HYDROCHLORIDE 1 MG: 1 INJECTION, SOLUTION INTRAMUSCULAR; INTRAVENOUS; SUBCUTANEOUS at 05:28

## 2020-09-28 RX ADMIN — ESTRADIOL 0.5 MG: 1 TABLET ORAL at 10:00

## 2020-09-28 RX ADMIN — GABAPENTIN 800 MG: 400 CAPSULE ORAL at 09:59

## 2020-09-28 RX ADMIN — HYDROMORPHONE HYDROCHLORIDE 0.5 MG: 1 INJECTION, SOLUTION INTRAMUSCULAR; INTRAVENOUS; SUBCUTANEOUS at 10:19

## 2020-09-28 RX ADMIN — SODIUM CHLORIDE: 9 INJECTION, SOLUTION INTRAVENOUS at 09:53

## 2020-09-28 RX ADMIN — FAMOTIDINE 20 MG: 20 TABLET ORAL at 09:58

## 2020-09-28 RX ADMIN — ATENOLOL 25 MG: 25 TABLET ORAL at 09:59

## 2020-09-28 RX ADMIN — SODIUM CHLORIDE: 9 INJECTION, SOLUTION INTRAVENOUS at 05:31

## 2020-09-28 RX ADMIN — BISMUTH SUBSALICYLATE 524 MG: 262 TABLET, CHEWABLE ORAL at 12:52

## 2020-09-28 RX ADMIN — SODIUM CHLORIDE, PRESERVATIVE FREE 10 ML: 5 INJECTION INTRAVENOUS at 10:04

## 2020-09-28 RX ADMIN — Medication 5000 UNITS: at 09:58

## 2020-09-28 RX ADMIN — PANTOPRAZOLE SODIUM 40 MG: 40 TABLET, DELAYED RELEASE ORAL at 05:30

## 2020-09-28 RX ADMIN — GABAPENTIN 800 MG: 400 CAPSULE ORAL at 14:36

## 2020-09-28 RX ADMIN — HYDROMORPHONE HYDROCHLORIDE 0.5 MG: 1 INJECTION, SOLUTION INTRAMUSCULAR; INTRAVENOUS; SUBCUTANEOUS at 16:21

## 2020-09-28 RX ADMIN — ONDANSETRON 4 MG: 2 INJECTION INTRAMUSCULAR; INTRAVENOUS at 13:03

## 2020-09-28 RX ADMIN — MULTIPLE VITAMINS W/ MINERALS TAB 1 TABLET: TAB at 09:59

## 2020-09-28 RX ADMIN — Medication 1 CAPSULE: at 10:00

## 2020-09-28 RX ADMIN — PROMETHAZINE HYDROCHLORIDE 12.5 MG: 25 INJECTION INTRAMUSCULAR; INTRAVENOUS at 05:29

## 2020-09-28 ASSESSMENT — ENCOUNTER SYMPTOMS
ABDOMINAL PAIN: 1
RESPIRATORY NEGATIVE: 1
BACK PAIN: 1
ALLERGIC/IMMUNOLOGIC NEGATIVE: 1
NAUSEA: 1
EYES NEGATIVE: 1

## 2020-09-28 ASSESSMENT — PAIN SCALES - GENERAL
PAINLEVEL_OUTOF10: 4
PAINLEVEL_OUTOF10: 6
PAINLEVEL_OUTOF10: 7
PAINLEVEL_OUTOF10: 5

## 2020-09-28 NOTE — DISCHARGE SUMMARY
Patient: Jaxon Rocha MD      Gender: female  : 1970   Age: 44 y.o. MRN: [id number]    Admitting Physician: Vicente Farah MD  Discharge Physician: Vicente Farah MD     Code Status: Full Code     Admit Date: 2020   Discharge Date: 20      Disposition:  Home       Condition at Discharge:  stable . Follow-up appointments:  f/u one week with PCP , and with consultants as recommended . Outpatient to do list: f/u       Discharge Diagnoses: Active Hospital Problems    Diagnosis    Abdominal pain [R10.9]     Priority: Medium   h/o pseudoseizure   S/p gastric sleep   Morbid obesity   Anxiety   Chronic abdominal pain   H/o chronic pancreatitis   Questionable hematemesis        History of Present Illness:  Cristy Owusu is a 44 y.o. female with morbid obesity , Anxiety , h/o Upper GI bleed few years ago , s/p gastric sleeve on 2019 with mild gastritis , multiple  EGD  History of pancreatitis , Pt has chronic RUQ abdominal pain associated with nausea, vomiting and she has been admitted few times with same symptoms . Pt was seen recently on [facility]   SBFT done with no active finding . History obtained from patient .             Hospital Course:   Tele   IVF   Cont Keppra ,   surg consult     MRI and  MRCP non acute . [facility] possible referral to [facility] or [facility]   Symptoms control . [facility] Zofran, phenergan, Reglan/// percocet and dialudid   surg input noted . .. GI consult for ERCP id possible          monitor H/H , Advance clear liquid diet as tolerated     Home meds , reviewed and resumed as appropriate   Symptoms releif/Pain control  DVT proph         Consults.   IP CONSULT TO GENERAL SURGERY  IP CONSULT TO HOSPITALIST  IP CONSULT TO PRIMARY CARE PROVIDER  IP CONSULT TO GI        Discharge Medications:   Current Discharge Medication List      START taking these medications    Details   meclizine (ANTIVERT) 25 MG tablet Take 1 tablet by mouth 3 times daily as needed for Dizziness  Qty: 30 tablet, Refills: 2      bismuth subsalicylate (PEPTO BISMOL) 262 MG chewable tablet Take 2 tablets by mouth 4 times daily (before meals and nightly) for 5 days  Qty: 40 tablet, Refills: 1           Current Discharge Medication List        Current Discharge Medication List      CONTINUE these medications which have NOT CHANGED    Details   sucralfate (CARAFATE) 1 GM tablet Take 1 tablet by mouth every 12 hours  Qty: 120 tablet, Refills: 3      ondansetron (ZOFRAN ODT) 4 MG disintegrating tablet Take 1 tablet by mouth every 8 hours as needed for Nausea or Vomiting  Qty: 30 tablet, Refills: 5      levETIRAcetam (KEPPRA) 1000 MG tablet Take 1 tablet by mouth 2 times daily  Qty: 60 tablet, Refills: 5      pantoprazole (PROTONIX) 40 MG tablet Take 1 tablet by mouth every morning (before breakfast)  Qty: 90 tablet, Refills: 1      dicyclomine (BENTYL) 10 MG capsule Take 1 capsule by mouth 3 times daily as needed (abdominal pain)  Qty: 21 capsule, Refills: 3      oxyCODONE-acetaminophen (PERCOCET) 5-325 MG per tablet Take 1 tablet by mouth.  Every 4-6 hours PRN      Cholecalciferol (VITAMIN D3) 5000 units TABS Take 1 tablet by mouth daily      estradiol (ESTRACE) 0.5 MG tablet Take 0.5 mg by mouth daily      atenolol (TENORMIN) 25 MG tablet Take 25 mg by mouth daily      Multiple Vitamin (MVI, BARIATRIC ADVANTAGE MULTI-FORMULA, CHEW TAB) Take 1 tablet by mouth daily  Qty: 30 tablet, Refills: 5      levothyroxine (SYNTHROID) 125 MCG tablet Take 1 tablet by mouth Daily  Qty: 30 tablet, Refills: 0      gabapentin (NEURONTIN) 800 MG tablet Take 800 mg by mouth 3 times daily      PARoxetine (PAXIL) 30 MG tablet Take 50 mg by mouth nightly       albuterol sulfate  (90 Base) MCG/ACT inhaler       Calcium Citrate-Vitamin D (CALCIUM + VIT D, BARIATRIC ADVANTAGE, CHEWABLE TABLET) Take 1 tablet by mouth 2 times daily  Qty: 120 tablet, Refills: 5           Current Discharge Medication List      STOP taking these medications       tiZANidine (ZANAFLEX) 4 MG tablet Comments:   Reason for Stopping:         LORazepam (ATIVAN) 1 MG tablet Comments:   Reason for Stopping:               Discharge ROS:  A complete review of systems was asked and negative except for abd pain/nausea . Discharge Exam:    /80   Pulse 59   Temp 98.3 °F (36.8 °C) (Oral)   Resp 17   Ht 5' 4\" (1.626 m)   Wt 288 lb (130.6 kg)   SpO2 95%   BMI 49.44 kg/m²   General appearance:  NAD  Heart[de-identified] Normal s1/s2, RRR, no murmurs, gallops, or rubs. No leg edema  Lungs:  Clear to auscultation, bilaterally without Rales/Wheezes/Rhonchi. Abdomen: Soft, non-tender, non-distended, bowel sounds present  Musculoskeletal:   no cyanosis, no edema  Neurologic:  Cranial nerves: II-XII intact, grossly non-focal.  Psychiatric:  A & O x3      Labs:  For convenience and continuity at follow-up the following most recent labs are provided:    Lab Results   Component Value Date    WBC 4.2 09/28/2020    HGB 9.8 09/28/2020    HCT 34.5 09/28/2020    MCV 89.6 09/28/2020     09/28/2020     09/25/2020    K 4.7 09/25/2020     09/25/2020    CO2 26 09/25/2020    BUN 18 09/25/2020    CREATININE 0.9 09/25/2020    CALCIUM 9.6 09/25/2020    PHOS 4.4 12/04/2015    BNP 88 11/26/2010    ALKPHOS 100 09/24/2020    ALT 20 09/24/2020    AST 26 09/24/2020    BILITOT 0.1 09/24/2020    BILIDIR 0.2 01/02/2016    LABALBU 4.2 09/24/2020    LABALBU 8 11/18/2015    TRIG 161 07/23/2015     Lab Results   Component Value Date    INR 0.93 08/07/2020    INR 0.93 01/10/2020    INR ? 01/09/2020           Chart review shows recent radiographs:  Mri Abdomen Wo Contrast Mrcp    Result Date: 9/25/2020  EXAMINATION: MRI OF THE ABDOMEN WITHOUT CONTRAST AND MRCP 9/25/2020 12:41 pm TECHNIQUE: Multiplanar multisequence MRI of the abdomen was performed without the administration of intravenous contrast.  After initial T2 axial and coronal images, thick slab, thin slab and 3D coronal MRCP sequences were obtained without the administration of intravenous contrast.  MIP images are provided for review. COMPARISON: 08/17/2020 CT HISTORY: ORDERING SYSTEM PROVIDED HISTORY: check bile duct, chronic air TECHNOLOGIST PROVIDED HISTORY: Reason for exam:->check bile duct, chronic air Is the patient pregnant?->No Reason for Exam: check bile duct, chronic air; pt states abd pain Acuity: Acute Type of Exam: Initial Relevant Medical/Surgical History: none FINDINGS: Gallbladder: The gallbladder is surgically absent Bile Ducts: Artifact from air in the intrahepatic biliary ducts corresponding to known pneumobilia observed on several prior imaging studies. Due to the presence of air, evaluation of the common bile duct is very limited, not fully visualized with MRCP technique. The distal common bile duct measures 10 mm which does appear relatively stable from recent imaging. Pancreatic Duct: The pancreatic duct is normal. Other: There are no other significant findings in the abdomen. 1. Technically limited MRCP due to pneumobilia. This obscures signal within the common bile duct. 2. Stable chronic biliary dilatation of the CBD which is likely physiologic given a prior history of cholecystectomy. Fl Ugi    Result Date: 9/24/2020  EXAMINATION: SINGLE CONTRAST UPPER GI SERIES 9/24/2020 HISTORY: ORDERING SYSTEM PROVIDED HISTORY: abd pain TECHNOLOGIST PROVIDED HISTORY: No small bowel follow through Reason for exam:->abd pain Reason for Exam: abd pain Similar exam 07/06/2020 COMPARISON: None. TECHNIQUE: Multiple single contrast images of the esophagus, gastroesophageal junction and stomach were obtained following the oral administration of water soluble contrast FLUOROSCOPY DOSE AND TYPE OR TIME AND EXPOSURES: Air Kerma 24.8 mGy FINDINGS: There is some esophageal dysmotility. Contrast passes into the stomach without difficulty. Postsurgical changes from sleeve gastrectomy appear unchanged. No area of stenosis. No leak. Contrast passes into the duodenum without difficulty. Postsurgical changes from sleeve gastrectomy without evidence of acute complication. No stricture or leak. EKG     Rhythm: normal sinus  and atrial fibrillation - controlled  Rate: normal  Clinical Impression: no acute changes        The patient was seen and examined on day of discharge and this discharge summary is in conjunction with any daily progress note from day of discharge. Time Spent on discharge is   >35  min  in the examination, evaluation, counseling and review of medications and discharge plan.             SignedAlessandra Wallace MD   9/28/2020

## 2020-09-28 NOTE — PROGRESS NOTES
General Surgery    Hospital Day: 5    Chief Complaint on Admission: acute on chronic abdominal pain       Subjective:     Pt continues to report right sided abdominal pain that radiates to her right side and around to her spine. Denies any pain on left side.  -tolerating her diet in small amounts  -still c/o nausea, did have episode of vomiting yesterday.  -patient expressing concerns about [facility]. States that she was just there a month ago and they did nothing for her. ROS:  Review of Systems   Constitutional: Negative. HENT: Negative. Eyes: Negative. Respiratory: Negative. Cardiovascular: Negative. Gastrointestinal: Positive for abdominal pain and nausea. Endocrine: Negative. Genitourinary: Negative. Musculoskeletal: Positive for back pain. Skin: Negative. Allergic/Immunologic: Negative. Neurological: Negative. Hematological: Negative. Psychiatric/Behavioral: Negative. Allergies  Aspirin; Shellfish-derived products; Toradol [ketorolac tromethamine]; Compazine [prochlorperazine]; and Reglan [metoclopramide]          Diagnosis Date    Acid reflux     Anemia     Anxiety     Arthritis     Hands, Back And Ankles    Bleeding ulcer 2014    \"I Had Ulcers In My Stomach And Colon\"/ per pt on 8/12/2019\"they said recently having some blood in my stomach- in July ( 2019)could not find where coming from \"    Bronchitis Last Episode 2014    Chronic back pain     Chronic pain     Sees Dr. Himanshu Scott COPD (chronic obstructive pulmonary disease) ([address]     Sees Dr. Joelle Fernandez Depression     Fibromyalgia Dx 2013    H/O echocardiogram 8/11/15    EF >55%. LA to be at the upper limit of normal in size. LV hypertrophy with normal LV systolic, but abnormal diastolic function. Normal valvular structures and function.      H/O echocardiogram 08/30/2018    EF 55-60%    Hiatal hernia     History of blood transfusion 09/2015 And 2018    No Reaction To Blood Transfusions Received    [place] (hard of hearing)     Right Ear    Hx of cardiac catheterization 10/24/2010    Angiographically normal coronary arteries w/ normal LV function and wall motion.  Hx of transesophageal echocardiography (PILO) for monitoring 2010    EF 55-60%. WNL. 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 name   Ashley Vi cysts     \"they found that I have a kidney cyst but not sure which side\" per pt on 7/15/2020    Lupus ([address] Dx     \"Rheumatoid Lupus\"    MDRO (multiple drug resistant organisms) resistance     4 months ago chest from mediport    Morbid obesity ([address]     MRSA bacteremia     Nausea     \"Most Of The Time\"    Pain management     Sees Dr. Jackie Teran, Once A Month    Pancreatitis chronic Dx     Pneumonia Dx 11-15    Shortness of breath on exertion     Shortness of breath on exertion     Sleep apnea     Has CPAP\"no longer use the cpap since lost weight\"    Staph infection Dx 11-15    Left Foot    Staph infection Dx -15    \"Left Foot\"    Teeth missing     Upper And Lower    Thyroid disease     Wears glasses     To Read       Objective:     Vitals:    20 0516   BP: (!) 145/85   Pulse: 66   Resp: 15   Temp: 98 °F (36.7 °C)   SpO2: 100%       TEMPERATURE:  Current -Temp: 98 °F (36.7 °C); Max - Temp  Av.3 °F (36.8 °C)  Min: 98 °F (36.7 °C)  Max: 98.4 °F (36.9 °C)    I/O this shift:  In: 250 [P.O.:240; I.V.:10]  Out: - I/O last 3 completed shifts: In: 2756.3 [P.O.:1080; I.V.:1676.3]  Out: 900 [Urine:900]      Physical Exam:  Physical Exam  Vitals signs reviewed. Constitutional:       General: She is not in acute distress. Appearance: She is obese.  She is not sodium chloride flush, potassium chloride **OR** potassium alternative oral replacement **OR** potassium chloride, magnesium sulfate, acetaminophen **OR** acetaminophen, polyethylene glycol, fleet      Labs/Imaging Results:   Lab Results   Component Value Date    WBC 4.2 09/28/2020    HGB 9.8 (L) 09/28/2020    HCT 34.5 (L) 09/28/2020    MCV 89.6 09/28/2020     09/28/2020     Lab Results   Component Value Date     09/25/2020    K 4.7 09/25/2020     09/25/2020    CO2 26 09/25/2020    BUN 18 09/25/2020    CREATININE 0.9 09/25/2020    GLUCOSE 93 09/25/2020    CALCIUM 9.6 09/25/2020    PROT 6.3 (L) 09/24/2020    LABALBU 4.2 09/24/2020    BILITOT 0.1 09/24/2020    ALKPHOS 100 09/24/2020    AST 26 09/24/2020    ALT 20 09/24/2020    LABGLOM >60 09/25/2020    GFRAA >60 09/25/2020       MRCP:  1. Technically limited MRCP due to pneumobilia.  This obscures signal within    the common bile duct. 2. Stable chronic biliary dilatation of the CBD which is likely physiologic    given a prior history of cholecystectomy. UGI:      Postsurgical changes from sleeve gastrectomy without evidence of acute    complication.  No stricture or leak. Assessment:       45 y/o F with remove hx of sleeve gastrectomy for morbid obesity, now admitted with acute on chronic abdominal pain    Plan:     -Appreciate GI c/s and recs. Noted plan for possible referral to . Pt has been resistant in past. Currently stating that she is resistant due to the distance.    -Noted plans for referral to [address] [place] [address] for further w/u and recs. However patient states that she was there a month ago and \"they didn't do anything\"    -Pain control. Pt sees pain mgt as outpt. She is on chronic narcotics. I suspect this is also contributing to her issues.     -Pt has had many studies and procedures (UGI, abdominal imaging, and EGDs) all without identification of pt's symptoms. No acute surgical intervention planned at this time.  Normal s/p sleeve gastrectomy changes.     -Diet as tolerated.     -Ambulate / IS / DVT prophylaxis.     -Pt states that she has discussed previously with Dr. Olga Armendariz about converting her sleeve gastrectomy to a RYGB. Dr. Olga Armendariz to come and discuss with patient. Nothing to be done on this admission. -okay to D/C from surgery standpoint Dr. Olga Armendariz recommends patient reach out to facility in [facility] where previous ERCPs were done.         Electronically signed by MICHAEL Temple CNP on 9/28/2020 at 11:19 AM

## 2020-09-28 NOTE — PROGRESS NOTES
Patient noncompliant with diet as she had a gastric sleeve in February of 2019. She is addiment about her pain medication and IV phenergan however she asks for hot tea with 6 sugars and jose miguel crackers with peanut butter. Education provided about diet to patient to help her understand that she should try to stick to her bariatric diet. She proceeded to tell me \"Dr. Ingrid Bruno may do a [place] y tomorrow and that should fix things. \" I explained to her that surgery is not a quick fix and it is a lifestyle change. Mediport reaccessed as it was occluded and is now running without complications.  Bed in lowest position, call light within reach, will continue to monitor

## 2020-09-28 NOTE — PLAN OF CARE
Problem: Falls - Risk of:  Goal: Will remain free from falls  Description: Will remain free from falls  9/27/2020 2352 by Pau Richard RN  Outcome: Ongoing  9/27/2020 1029 by Miriam Flores RN  Outcome: Ongoing  Goal: Absence of physical injury  Description: Absence of physical injury  9/27/2020 2352 by Pau Richard RN  Outcome: Ongoing  9/27/2020 1029 by Miriam Flores RN  Outcome: Ongoing     Problem: Pain:  Goal: Pain level will decrease  Description: Pain level will decrease  9/27/2020 2352 by Pau Richard RN  Outcome: Ongoing  9/27/2020 1029 by Miriam Flores RN  Outcome: Ongoing  Goal: Control of acute pain  Description: Control of acute pain  9/27/2020 2352 by Pau Richard RN  Outcome: Ongoing  9/27/2020 1029 by Miriam Flores RN  Outcome: Ongoing  Goal: Control of chronic pain  Description: Control of chronic pain  9/27/2020 2352 by Pau Richard RN  Outcome: Ongoing  9/27/2020 1029 by Miriam Flores RN  Outcome: Ongoing     Problem: Safety:  Goal: Free from accidental physical injury  Description: Free from accidental physical injury  9/27/2020 2352 by Pau Richard RN  Outcome: Ongoing  9/27/2020 1029 by Miriam Flores RN  Outcome: Ongoing  Goal: Free from intentional harm  Description: Free from intentional harm  9/27/2020 2352 by Pau Richard RN  Outcome: Ongoing  9/27/2020 1029 by Miriam Flores RN  Outcome: Ongoing     Problem: Daily Care:  Goal: Daily care needs are met  Description: Daily care needs are met  9/27/2020 2352 by Pau Richard RN  Outcome: Ongoing  9/27/2020 1029 by Miriam Flores RN  Outcome: Ongoing     Problem: Discharge Planning:  Goal: Patients continuum of care needs are met  Description: Patients continuum of care needs are met  9/27/2020 2352 by Pau Richard RN  Outcome: Ongoing  9/27/2020 1029 by Miriam Flores RN  Outcome:

## 2020-09-30 PROBLEM — R10.11 RUQ PAIN: Status: ACTIVE | Noted: 2020-09-30

## 2020-10-17 ENCOUNTER — APPOINTMENT (OUTPATIENT)
Dept: CT IMAGING | Age: 50
End: 2020-10-17
Payer: COMMERCIAL

## 2020-10-17 ENCOUNTER — HOSPITAL ENCOUNTER (OUTPATIENT)
Age: 50
Setting detail: OBSERVATION
Discharge: HOME | End: 2020-10-20
Attending: FAMILY MEDICINE | Admitting: FAMILY MEDICINE
Payer: COMMERCIAL

## 2020-10-17 PROBLEM — R11.10 INTRACTABLE VOMITING: Status: ACTIVE | Noted: 2020-10-17

## 2020-10-17 LAB
ALBUMIN SERPL-MCNC: 4.1 GM/DL (ref 3.4–5)
ALP BLD-CCNC: 91 IU/L (ref 40–128)
ALT SERPL-CCNC: 17 U/L (ref 10–40)
ANION GAP SERPL CALCULATED.3IONS-SCNC: 7 MMOL/L (ref 4–16)
AST SERPL-CCNC: 18 IU/L (ref 15–37)
BACTERIA: ABNORMAL /HPF
BASOPHILS ABSOLUTE: 0 K/CU MM
BASOPHILS RELATIVE PERCENT: 0.6 % (ref 0–1)
BILIRUB SERPL-MCNC: 0.2 MG/DL (ref 0–1)
BILIRUBIN URINE: ABNORMAL MG/DL
BLOOD, URINE: NEGATIVE
BUN BLDV-MCNC: 17 MG/DL (ref 6–23)
CALCIUM SERPL-MCNC: 10.1 MG/DL (ref 8.3–10.6)
CHLORIDE BLD-SCNC: 109 MMOL/L (ref 99–110)
CLARITY: ABNORMAL
CO2: 24 MMOL/L (ref 21–32)
COLOR: YELLOW
CREAT SERPL-MCNC: 0.7 MG/DL (ref 0.6–1.1)
DIFFERENTIAL TYPE: ABNORMAL
EOSINOPHILS ABSOLUTE: 0.2 K/CU MM
EOSINOPHILS RELATIVE PERCENT: 4.3 % (ref 0–3)
GFR AFRICAN AMERICAN: >60 ML/MIN/1.73M2
GFR NON-AFRICAN AMERICAN: >60 ML/MIN/1.73M2
GLUCOSE BLD-MCNC: 87 MG/DL (ref 70–99)
GLUCOSE, URINE: NEGATIVE MG/DL
HCT VFR BLD CALC: 35.9 % (ref 37–47)
HEMOGLOBIN: 10.9 GM/DL (ref 12.5–16)
ICTOTEST: POSITIVE
IMMATURE NEUTROPHIL %: 0 % (ref 0–0.43)
KETONES, URINE: NEGATIVE MG/DL
LEUKOCYTE ESTERASE, URINE: NEGATIVE
LIPASE: 20 IU/L (ref 13–60)
LYMPHOCYTES ABSOLUTE: 2.2 K/CU MM
LYMPHOCYTES RELATIVE PERCENT: 44.3 % (ref 24–44)
MCH RBC QN AUTO: 25.5 PG (ref 27–31)
MCHC RBC AUTO-ENTMCNC: 30.4 % (ref 32–36)
MCV RBC AUTO: 84.1 FL (ref 78–100)
MONOCYTES ABSOLUTE: 0.3 K/CU MM
MONOCYTES RELATIVE PERCENT: 6.9 % (ref 0–4)
MUCUS: ABNORMAL HPF
NITRITE URINE, QUANTITATIVE: NEGATIVE
NUCLEATED RBC %: 0 %
PDW BLD-RTO: 13.6 % (ref 11.7–14.9)
PH, URINE: 5 (ref 5–8)
PLATELET # BLD: 219 K/CU MM (ref 140–440)
PMV BLD AUTO: 9.8 FL (ref 7.5–11.1)
POTASSIUM SERPL-SCNC: 4.6 MMOL/L (ref 3.5–5.1)
PROTEIN UA: NEGATIVE MG/DL
RBC # BLD: 4.27 M/CU MM (ref 4.2–5.4)
RBC URINE: 1 /HPF (ref 0–6)
SEGMENTED NEUTROPHILS ABSOLUTE COUNT: 2.2 K/CU MM
SEGMENTED NEUTROPHILS RELATIVE PERCENT: 43.9 % (ref 36–66)
SODIUM BLD-SCNC: 140 MMOL/L (ref 135–145)
SPECIFIC GRAVITY UA: 1.03 (ref 1–1.03)
SQUAMOUS EPITHELIAL: 19 /HPF
TOTAL IMMATURE NEUTOROPHIL: 0 K/CU MM
TOTAL NUCLEATED RBC: 0 K/CU MM
TOTAL PROTEIN: 6.6 GM/DL (ref 6.4–8.2)
TRANSITIONAL EPITHELIAL: <1 /HPF
TRICHOMONAS: ABNORMAL /HPF
UROBILINOGEN, URINE: NORMAL MG/DL (ref 0.2–1)
WBC # BLD: 4.9 K/CU MM (ref 4–10.5)
WBC UA: 2 /HPF (ref 0–5)

## 2020-10-17 PROCEDURE — 6360000002 HC RX W HCPCS: Performed by: FAMILY MEDICINE

## 2020-10-17 PROCEDURE — 6370000000 HC RX 637 (ALT 250 FOR IP): Performed by: FAMILY MEDICINE

## 2020-10-17 PROCEDURE — G0378 HOSPITAL OBSERVATION PER HR: HCPCS

## 2020-10-17 PROCEDURE — 80053 COMPREHEN METABOLIC PANEL: CPT

## 2020-10-17 PROCEDURE — 96375 TX/PRO/DX INJ NEW DRUG ADDON: CPT

## 2020-10-17 PROCEDURE — 96372 THER/PROPH/DIAG INJ SC/IM: CPT

## 2020-10-17 PROCEDURE — 6360000002 HC RX W HCPCS: Performed by: PHYSICIAN ASSISTANT

## 2020-10-17 PROCEDURE — 2580000003 HC RX 258: Performed by: PHYSICIAN ASSISTANT

## 2020-10-17 PROCEDURE — 83690 ASSAY OF LIPASE: CPT

## 2020-10-17 PROCEDURE — 96374 THER/PROPH/DIAG INJ IV PUSH: CPT

## 2020-10-17 PROCEDURE — 6360000004 HC RX CONTRAST MEDICATION: Performed by: PHYSICIAN ASSISTANT

## 2020-10-17 PROCEDURE — 2580000003 HC RX 258: Performed by: FAMILY MEDICINE

## 2020-10-17 PROCEDURE — 74177 CT ABD & PELVIS W/CONTRAST: CPT

## 2020-10-17 PROCEDURE — 96376 TX/PRO/DX INJ SAME DRUG ADON: CPT

## 2020-10-17 PROCEDURE — 85025 COMPLETE CBC W/AUTO DIFF WBC: CPT

## 2020-10-17 PROCEDURE — 99285 EMERGENCY DEPT VISIT HI MDM: CPT

## 2020-10-17 PROCEDURE — 81001 URINALYSIS AUTO W/SCOPE: CPT

## 2020-10-17 PROCEDURE — 94761 N-INVAS EAR/PLS OXIMETRY MLT: CPT

## 2020-10-17 RX ORDER — METOCLOPRAMIDE 10 MG/1
10 TABLET ORAL
Status: DISCONTINUED | OUTPATIENT
Start: 2020-10-17 | End: 2020-10-17

## 2020-10-17 RX ORDER — LEVETIRACETAM 500 MG/1
1000 TABLET ORAL 2 TIMES DAILY
Status: DISCONTINUED | OUTPATIENT
Start: 2020-10-17 | End: 2020-10-20 | Stop reason: HOSPADM

## 2020-10-17 RX ORDER — GABAPENTIN 400 MG/1
800 CAPSULE ORAL 3 TIMES DAILY
Status: DISCONTINUED | OUTPATIENT
Start: 2020-10-17 | End: 2020-10-20 | Stop reason: HOSPADM

## 2020-10-17 RX ORDER — MORPHINE SULFATE 2 MG/ML
2 INJECTION, SOLUTION INTRAMUSCULAR; INTRAVENOUS EVERY 4 HOURS PRN
Status: DISCONTINUED | OUTPATIENT
Start: 2020-10-17 | End: 2020-10-18 | Stop reason: SDUPTHER

## 2020-10-17 RX ORDER — MECLIZINE HYDROCHLORIDE 25 MG/1
25 TABLET ORAL 3 TIMES DAILY PRN
Status: DISCONTINUED | OUTPATIENT
Start: 2020-10-17 | End: 2020-10-20 | Stop reason: HOSPADM

## 2020-10-17 RX ORDER — ACETAMINOPHEN 325 MG/1
650 TABLET ORAL EVERY 6 HOURS PRN
Status: DISCONTINUED | OUTPATIENT
Start: 2020-10-17 | End: 2020-10-20 | Stop reason: HOSPADM

## 2020-10-17 RX ORDER — ONDANSETRON 2 MG/ML
4 INJECTION INTRAMUSCULAR; INTRAVENOUS ONCE
Status: COMPLETED | OUTPATIENT
Start: 2020-10-17 | End: 2020-10-17

## 2020-10-17 RX ORDER — SODIUM CHLORIDE 0.9 % (FLUSH) 0.9 %
10 SYRINGE (ML) INJECTION 2 TIMES DAILY
Status: DISCONTINUED | OUTPATIENT
Start: 2020-10-17 | End: 2020-10-20 | Stop reason: HOSPADM

## 2020-10-17 RX ORDER — PROMETHAZINE HYDROCHLORIDE 25 MG/ML
12.5 INJECTION, SOLUTION INTRAMUSCULAR; INTRAVENOUS EVERY 6 HOURS PRN
Status: DISCONTINUED | OUTPATIENT
Start: 2020-10-17 | End: 2020-10-20

## 2020-10-17 RX ORDER — PROMETHAZINE HYDROCHLORIDE 25 MG/ML
25 INJECTION, SOLUTION INTRAMUSCULAR; INTRAVENOUS ONCE
Status: COMPLETED | OUTPATIENT
Start: 2020-10-17 | End: 2020-10-17

## 2020-10-17 RX ORDER — OXYCODONE HYDROCHLORIDE AND ACETAMINOPHEN 5; 325 MG/1; MG/1
1 TABLET ORAL EVERY 6 HOURS PRN
Status: DISCONTINUED | OUTPATIENT
Start: 2020-10-17 | End: 2020-10-20 | Stop reason: HOSPADM

## 2020-10-17 RX ORDER — ONDANSETRON 4 MG/1
4 TABLET, ORALLY DISINTEGRATING ORAL EVERY 8 HOURS PRN
Status: DISCONTINUED | OUTPATIENT
Start: 2020-10-17 | End: 2020-10-20 | Stop reason: HOSPADM

## 2020-10-17 RX ORDER — TIZANIDINE 4 MG/1
4 TABLET ORAL EVERY 8 HOURS PRN
Status: DISCONTINUED | OUTPATIENT
Start: 2020-10-17 | End: 2020-10-20 | Stop reason: HOSPADM

## 2020-10-17 RX ORDER — POTASSIUM CHLORIDE 7.45 MG/ML
10 INJECTION INTRAVENOUS PRN
Status: DISCONTINUED | OUTPATIENT
Start: 2020-10-17 | End: 2020-10-20 | Stop reason: HOSPADM

## 2020-10-17 RX ORDER — DICYCLOMINE HYDROCHLORIDE 10 MG/1
10 CAPSULE ORAL 3 TIMES DAILY PRN
Status: DISCONTINUED | OUTPATIENT
Start: 2020-10-17 | End: 2020-10-20 | Stop reason: HOSPADM

## 2020-10-17 RX ORDER — SUCRALFATE 1 G/1
1 TABLET ORAL EVERY 12 HOURS SCHEDULED
Status: DISCONTINUED | OUTPATIENT
Start: 2020-10-17 | End: 2020-10-20 | Stop reason: HOSPADM

## 2020-10-17 RX ORDER — DICYCLOMINE HYDROCHLORIDE 10 MG/ML
20 INJECTION INTRAMUSCULAR ONCE
Status: COMPLETED | OUTPATIENT
Start: 2020-10-17 | End: 2020-10-17

## 2020-10-17 RX ORDER — PANTOPRAZOLE SODIUM 40 MG/1
40 TABLET, DELAYED RELEASE ORAL
Status: DISCONTINUED | OUTPATIENT
Start: 2020-10-18 | End: 2020-10-20 | Stop reason: HOSPADM

## 2020-10-17 RX ORDER — MULTIVIT-MIN/FERROUS GLUCONATE 9 MG/15 ML
15 LIQUID (ML) ORAL DAILY
Status: DISCONTINUED | OUTPATIENT
Start: 2020-10-17 | End: 2020-10-20 | Stop reason: HOSPADM

## 2020-10-17 RX ORDER — LEVOTHYROXINE SODIUM 0.1 MG/1
200 TABLET ORAL DAILY
Status: DISCONTINUED | OUTPATIENT
Start: 2020-10-17 | End: 2020-10-20 | Stop reason: HOSPADM

## 2020-10-17 RX ORDER — SODIUM CHLORIDE 0.9 % (FLUSH) 0.9 %
10 SYRINGE (ML) INJECTION EVERY 12 HOURS SCHEDULED
Status: DISCONTINUED | OUTPATIENT
Start: 2020-10-17 | End: 2020-10-20 | Stop reason: HOSPADM

## 2020-10-17 RX ORDER — CALCIUM CARBONATE 200(500)MG
750 TABLET,CHEWABLE ORAL 3 TIMES DAILY PRN
Status: DISCONTINUED | OUTPATIENT
Start: 2020-10-17 | End: 2020-10-20 | Stop reason: HOSPADM

## 2020-10-17 RX ORDER — SODIUM CHLORIDE 0.9 % (FLUSH) 0.9 %
10 SYRINGE (ML) INJECTION PRN
Status: DISCONTINUED | OUTPATIENT
Start: 2020-10-17 | End: 2020-10-20 | Stop reason: HOSPADM

## 2020-10-17 RX ORDER — CALCIUM CARBONATE/VITAMIN D3 250-3.125
1 TABLET ORAL 2 TIMES DAILY
Status: DISCONTINUED | OUTPATIENT
Start: 2020-10-17 | End: 2020-10-20 | Stop reason: HOSPADM

## 2020-10-17 RX ORDER — ONDANSETRON 2 MG/ML
4 INJECTION INTRAMUSCULAR; INTRAVENOUS EVERY 6 HOURS PRN
Status: DISCONTINUED | OUTPATIENT
Start: 2020-10-17 | End: 2020-10-20 | Stop reason: HOSPADM

## 2020-10-17 RX ORDER — OXYCODONE HYDROCHLORIDE AND ACETAMINOPHEN 5; 325 MG/1; MG/1
1 TABLET ORAL EVERY 8 HOURS PRN
Status: DISCONTINUED | OUTPATIENT
Start: 2020-10-17 | End: 2020-10-17

## 2020-10-17 RX ORDER — MORPHINE SULFATE 4 MG/ML
4 INJECTION, SOLUTION INTRAMUSCULAR; INTRAVENOUS ONCE
Status: COMPLETED | OUTPATIENT
Start: 2020-10-17 | End: 2020-10-17

## 2020-10-17 RX ORDER — NICOTINE 21 MG/24HR
1 PATCH, TRANSDERMAL 24 HOURS TRANSDERMAL DAILY
Status: DISCONTINUED | OUTPATIENT
Start: 2020-10-17 | End: 2020-10-20 | Stop reason: HOSPADM

## 2020-10-17 RX ORDER — LEVOTHYROXINE SODIUM 0.12 MG/1
125 TABLET ORAL DAILY
Status: DISCONTINUED | OUTPATIENT
Start: 2020-10-17 | End: 2020-10-17 | Stop reason: DRUGHIGH

## 2020-10-17 RX ORDER — ESTRADIOL 1 MG/1
0.5 TABLET ORAL DAILY
Status: DISCONTINUED | OUTPATIENT
Start: 2020-10-17 | End: 2020-10-20 | Stop reason: HOSPADM

## 2020-10-17 RX ORDER — POTASSIUM CHLORIDE 20 MEQ/1
40 TABLET, EXTENDED RELEASE ORAL PRN
Status: DISCONTINUED | OUTPATIENT
Start: 2020-10-17 | End: 2020-10-20 | Stop reason: HOSPADM

## 2020-10-17 RX ORDER — FAMOTIDINE 20 MG/1
20 TABLET, FILM COATED ORAL 2 TIMES DAILY
Status: DISCONTINUED | OUTPATIENT
Start: 2020-10-17 | End: 2020-10-20 | Stop reason: HOSPADM

## 2020-10-17 RX ORDER — MAGNESIUM SULFATE IN WATER 40 MG/ML
2 INJECTION, SOLUTION INTRAVENOUS PRN
Status: DISCONTINUED | OUTPATIENT
Start: 2020-10-17 | End: 2020-10-20 | Stop reason: HOSPADM

## 2020-10-17 RX ORDER — GUAIFENESIN 600 MG/1
1200 TABLET, EXTENDED RELEASE ORAL 2 TIMES DAILY
Status: DISCONTINUED | OUTPATIENT
Start: 2020-10-17 | End: 2020-10-20 | Stop reason: HOSPADM

## 2020-10-17 RX ORDER — SODIUM CHLORIDE 9 MG/ML
INJECTION, SOLUTION INTRAVENOUS CONTINUOUS
Status: DISCONTINUED | OUTPATIENT
Start: 2020-10-17 | End: 2020-10-20 | Stop reason: HOSPADM

## 2020-10-17 RX ORDER — ACETAMINOPHEN 650 MG/1
650 SUPPOSITORY RECTAL EVERY 6 HOURS PRN
Status: DISCONTINUED | OUTPATIENT
Start: 2020-10-17 | End: 2020-10-20 | Stop reason: HOSPADM

## 2020-10-17 RX ORDER — BENZONATATE 100 MG/1
200 CAPSULE ORAL 3 TIMES DAILY PRN
Status: DISCONTINUED | OUTPATIENT
Start: 2020-10-17 | End: 2020-10-20 | Stop reason: HOSPADM

## 2020-10-17 RX ORDER — ATENOLOL 25 MG/1
25 TABLET ORAL DAILY
Status: DISCONTINUED | OUTPATIENT
Start: 2020-10-17 | End: 2020-10-20 | Stop reason: HOSPADM

## 2020-10-17 RX ORDER — 0.9 % SODIUM CHLORIDE 0.9 %
1000 INTRAVENOUS SOLUTION INTRAVENOUS ONCE
Status: COMPLETED | OUTPATIENT
Start: 2020-10-17 | End: 2020-10-17

## 2020-10-17 RX ORDER — SODIUM PHOSPHATE, DIBASIC AND SODIUM PHOSPHATE, MONOBASIC 7; 19 G/133ML; G/133ML
1 ENEMA RECTAL DAILY PRN
Status: DISCONTINUED | OUTPATIENT
Start: 2020-10-17 | End: 2020-10-20 | Stop reason: HOSPADM

## 2020-10-17 RX ORDER — PROMETHAZINE HYDROCHLORIDE 25 MG/1
12.5 TABLET ORAL EVERY 6 HOURS PRN
Status: DISCONTINUED | OUTPATIENT
Start: 2020-10-17 | End: 2020-10-20 | Stop reason: HOSPADM

## 2020-10-17 RX ORDER — POLYETHYLENE GLYCOL 3350 17 G/17G
17 POWDER, FOR SOLUTION ORAL DAILY PRN
Status: DISCONTINUED | OUTPATIENT
Start: 2020-10-17 | End: 2020-10-20 | Stop reason: HOSPADM

## 2020-10-17 RX ORDER — ZOLPIDEM TARTRATE 5 MG/1
5 TABLET ORAL NIGHTLY PRN
Status: DISCONTINUED | OUTPATIENT
Start: 2020-10-17 | End: 2020-10-20 | Stop reason: HOSPADM

## 2020-10-17 RX ADMIN — ONDANSETRON 4 MG: 2 INJECTION INTRAMUSCULAR; INTRAVENOUS at 16:00

## 2020-10-17 RX ADMIN — GABAPENTIN 800 MG: 400 CAPSULE ORAL at 20:32

## 2020-10-17 RX ADMIN — PROMETHAZINE HYDROCHLORIDE 12.5 MG: 25 INJECTION INTRAMUSCULAR; INTRAVENOUS at 13:09

## 2020-10-17 RX ADMIN — PROMETHAZINE HYDROCHLORIDE 25 MG: 25 INJECTION INTRAMUSCULAR; INTRAVENOUS at 06:55

## 2020-10-17 RX ADMIN — SODIUM CHLORIDE: 9 INJECTION, SOLUTION INTRAVENOUS at 11:31

## 2020-10-17 RX ADMIN — TIZANIDINE 4 MG: 4 TABLET ORAL at 20:32

## 2020-10-17 RX ADMIN — MORPHINE SULFATE 4 MG: 4 INJECTION, SOLUTION INTRAMUSCULAR; INTRAVENOUS at 05:35

## 2020-10-17 RX ADMIN — OXYCODONE HYDROCHLORIDE AND ACETAMINOPHEN 1 TABLET: 5; 325 TABLET ORAL at 16:28

## 2020-10-17 RX ADMIN — LEVETIRACETAM 1000 MG: 500 TABLET, FILM COATED ORAL at 20:32

## 2020-10-17 RX ADMIN — MORPHINE SULFATE 2 MG: 2 INJECTION, SOLUTION INTRAMUSCULAR; INTRAVENOUS at 22:10

## 2020-10-17 RX ADMIN — MORPHINE SULFATE 2 MG: 2 INJECTION, SOLUTION INTRAMUSCULAR; INTRAVENOUS at 13:08

## 2020-10-17 RX ADMIN — ZOLPIDEM TARTRATE 5 MG: 5 TABLET ORAL at 20:32

## 2020-10-17 RX ADMIN — SODIUM CHLORIDE, PRESERVATIVE FREE 10 ML: 5 INJECTION INTRAVENOUS at 06:20

## 2020-10-17 RX ADMIN — DICYCLOMINE HYDROCHLORIDE 20 MG: 20 INJECTION, SOLUTION INTRAMUSCULAR at 05:34

## 2020-10-17 RX ADMIN — SODIUM CHLORIDE 1000 ML: 9 INJECTION, SOLUTION INTRAVENOUS at 05:34

## 2020-10-17 RX ADMIN — ONDANSETRON 4 MG: 2 INJECTION INTRAMUSCULAR; INTRAVENOUS at 05:34

## 2020-10-17 RX ADMIN — ONDANSETRON 4 MG: 2 INJECTION INTRAMUSCULAR; INTRAVENOUS at 22:23

## 2020-10-17 RX ADMIN — IOPAMIDOL 80 ML: 755 INJECTION, SOLUTION INTRAVENOUS at 06:19

## 2020-10-17 RX ADMIN — SODIUM CHLORIDE: 9 INJECTION, SOLUTION INTRAVENOUS at 23:19

## 2020-10-17 RX ADMIN — PAROXETINE HYDROCHLORIDE 50 MG: 10 TABLET, FILM COATED ORAL at 20:33

## 2020-10-17 RX ADMIN — MORPHINE SULFATE 4 MG: 4 INJECTION, SOLUTION INTRAMUSCULAR; INTRAVENOUS at 06:55

## 2020-10-17 RX ADMIN — MORPHINE SULFATE 2 MG: 2 INJECTION, SOLUTION INTRAMUSCULAR; INTRAVENOUS at 17:58

## 2020-10-17 RX ADMIN — PROMETHAZINE HYDROCHLORIDE 12.5 MG: 25 TABLET ORAL at 18:33

## 2020-10-17 ASSESSMENT — PAIN SCALES - GENERAL
PAINLEVEL_OUTOF10: 10
PAINLEVEL_OUTOF10: 8
PAINLEVEL_OUTOF10: 7
PAINLEVEL_OUTOF10: 7
PAINLEVEL_OUTOF10: 5
PAINLEVEL_OUTOF10: 7
PAINLEVEL_OUTOF10: 10
PAINLEVEL_OUTOF10: 7
PAINLEVEL_OUTOF10: 9

## 2020-10-17 ASSESSMENT — PAIN DESCRIPTION - ORIENTATION
ORIENTATION: RIGHT;MID
ORIENTATION: RIGHT;LEFT
ORIENTATION: RIGHT;LEFT

## 2020-10-17 ASSESSMENT — PAIN DESCRIPTION - PAIN TYPE
TYPE: CHRONIC PAIN
TYPE: ACUTE PAIN
TYPE: CHRONIC PAIN

## 2020-10-17 ASSESSMENT — PAIN DESCRIPTION - LOCATION
LOCATION: ABDOMEN

## 2020-10-17 ASSESSMENT — PAIN DESCRIPTION - FREQUENCY
FREQUENCY: CONTINUOUS

## 2020-10-17 ASSESSMENT — PAIN - FUNCTIONAL ASSESSMENT: PAIN_FUNCTIONAL_ASSESSMENT: PREVENTS OR INTERFERES SOME ACTIVE ACTIVITIES AND ADLS

## 2020-10-17 ASSESSMENT — PAIN DESCRIPTION - DESCRIPTORS
DESCRIPTORS: CONSTANT;STABBING
DESCRIPTORS: CONSTANT;STABBING

## 2020-10-17 ASSESSMENT — PAIN DESCRIPTION - ONSET: ONSET: ON-GOING

## 2020-10-17 ASSESSMENT — PAIN DESCRIPTION - PROGRESSION: CLINICAL_PROGRESSION: NOT CHANGED

## 2020-10-17 NOTE — ED PROVIDER NOTES
eMERGENCY dEPARTMENT eNCOUnter      [facility]    Chief Complaint   Patient presents with    Abdominal Pain     right sided abd pain; states it feels like pancreatitis       HPI    Ru South is a 44 y.o. female who presents with abdominal pain. Patient with a history of chronic abdominal pain. She is currently following with Dr. Alyssa Whitehead and Dr. Seth Jacinto. She states she has had a flare up of pain for the last week and Dr. Seth Jacinto suspects it is her pancreatitis. He is supposed to review the MRCP from her last admission and call her Monday to let her know if she needs bile duct stents. Pain worsened tonight and she began having forceful bilious vomiting. Denies fever or chills. Denies diarrhea or constipation. She has been trying to manage the pain at home with her Percocet but doesn't think she is holding it down now that vomiting started. Pain is to the RUQ with radiation around to the back. Sharp, cramping. Severity 7/10. REVIEW OF SYSTEMS    See HPI for further details. Review of systems otherwise negative. Constitutional:  Denies fever. HENT:  Denies headache. Respiratory:  Denies any shortness of breath. Cardiovascular:  Denies chest pain. GI:  + abdominal pain, + nausea, + vomiting, denies diarrhea, denies constipation. :  Denies urinary symptoms. Musculoskeletal:  Denies any extremity pain. Back:  Denies back pain. Integument:  Denies any skin changes. Lymphatic:  Denies lymphadenopathy.     PAST MEDICAL HISTORY    Past Medical History:   Diagnosis Date    Acid reflux     Anemia     Anxiety     Arthritis     Hands, Back And Ankles    Bleeding ulcer 2014    \"I Had Ulcers In My Stomach And Colon\"/ per pt on 8/12/2019\"they said recently having some blood in my stomach- in July ( 2019)could not find where coming from \"    Bronchitis Last Episode 2014    Chronic back pain     [facility]     [facility]    COPD [facility]ch[facility] obstructive pulmonary disease[facility])     Sees Dr. Benito Villar Depression     Fibromyalgia Dx 2013    H/O echocardiogram 8/11/15    EF >55%. LA to be at the upper limit of normal in size. LV hypertrophy with normal LV systolic, but abnormal diastolic function. Normal valvular structures and function.  H/O echocardiogram 08/30/2018    EF 55-60%    Hiatal hernia     History of blood transfusion 09/2015 And 2018    No Reaction To Blood Transfusions Received    [place] (hard of hearing)     Right Ear    Hx of cardiac catheterization 10/24/2010    Angiographically normal coronary arteries w/ normal LV function and wall motion.  Hx of transesophageal echocardiography (PILO) for monitoring 12/2/2010    EF 55-60%. WNL.    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 name    Kidney cysts     \"they found that I have a kidney cyst but not sure which side\" per pt on 7/15/2020    Lupus ([address] Dx 2013    \"Rheumatoid Lupus\"    MDRO (multiple drug resistant organisms) resistance     4 months ago chest from mediport    Morbid obesity ([address]     MRSA bacteremia     Nausea     \"Most Of The Time\"    Pain management     Sees Dr. Jackie Teran, Once A Month    Pancreatitis chronic Dx 2001    Pneumonia Dx 11-15    Shortness of breath on exertion     Shortness of breath on exertion     Sleep apnea     Has CPAP\"no longer use the cpap since lost weight\"    Staph infection Dx 11-15    Left Foot    Staph infection Dx 11-15    \"Left Foot\"    Teeth missing     Upper And Lower    Thyroid disease     Wears glasses     To Read       SURGICAL HISTORY    Past Surgical History:   Procedure Laterality Date    APPENDECTOMY  02/1998    Done When Tubes And Ovaries Were Removed by Margaret Rey MD at [address] [place] [address] N/A 7/22/2019    EGD DIAGNOSTIC ONLY performed by Tayo Richard MD at [address] [place] [address] N/A 10/14/2019    EGD DIAGNOSTIC ONLY performed by Margaret Rey MD at [address] [place] [address] N/A 7/17/2020    EGJ DILATATION BALLOON WITH 18-20 MM BALLOON, DILATED TO 20 MM. performed by Margaret Rey MD at [address] [place] [address]    Current Outpatient Rx   Medication Sig Dispense Refill    meclizine (ANTIVERT) 25 MG tablet Take 1 tablet by mouth 3 times daily as needed for Dizziness 30 tablet 2    sucralfate (CARAFATE) 1 GM tablet Take 1 tablet by mouth every 12 hours 120 tablet 3    albuterol sulfate  (90 Base) MCG/ACT inhaler       ondansetron (ZOFRAN ODT) 4 MG disintegrating tablet Take 1 tablet by mouth every 8 hours as needed for Nausea or Vomiting 30 tablet 5    levETIRAcetam (KEPPRA) 1000 MG tablet Take 1 tablet by mouth 2 times daily 60 tablet 5    pantoprazole (PROTONIX) 40 MG tablet Take 1 tablet by mouth every morning (before breakfast) 90 tablet 1    dicyclomine (BENTYL) 10 MG capsule Take 1 capsule by mouth 3 times daily as needed (abdominal pain) 21 capsule 3    oxyCODONE-acetaminophen (PERCOCET) 5-325 MG per tablet Take 1 tablet by mouth.  Every 4-6 hours PRN      Cholecalciferol (VITAMIN D3) 5000 units TABS Take 1 tablet by mouth daily      estradiol (ESTRACE) 0.5 MG tablet Take 0.5 mg by mouth daily      atenolol (TENORMIN) 25 MG tablet Take 25 mg by mouth daily      Multiple Vitamin (MVI, BARIATRIC ADVANTAGE MULTI-FORMULA, CHEW TAB) Take 1 tablet by mouth daily 30 tablet 5    Calcium Citrate-Vitamin D (CALCIUM + VIT D, BARIATRIC ADVANTAGE, CHEWABLE TABLET) Take 1 tablet by mouth 2 times daily 120 tablet 5    levothyroxine (SYNTHROID) 125 MCG tablet Take 1 tablet by mouth Daily (Patient taking differently: Take 125 mcg by mouth nightly ) 30 tablet 0    gabapentin (NEURONTIN) 800 MG tablet Take 800 mg by mouth 3 times daily      PARoxetine (PAXIL) 30 MG tablet Take 50 mg by mouth nightly          ALLERGIES    Allergies   Allergen Reactions    Aspirin Palpitations     \"My Heart Rate Elevates\"    Shellfish-Derived Products Swelling    Toradol [Ketorolac Tromethamine] Rash    Compazine [Prochlorperazine]     Reglan [Metoclopramide] Itching       FAMILY HISTORY    Family History   Problem Relation Age of Onset    Diabetes Mother         \"Borderline Diabetes\"    High Blood Pressure Mother     Obesity Mother     Arthritis Mother     Heart Disease Mother     High Cholesterol Mother     Vision Loss Mother     Diabetes Father     High Blood Pressure Father     Asthma Father     Cancer Father         prostate cancer    High Blood Pressure Brother     Asthma Son     Vision Loss Son     Lupus Daughter     Other Daughter         \"Alot Of Female Problems\"       SOCIAL HISTORY    Social History     Socioeconomic History    Marital status:      Spouse name: Not on file    Number of children: Not on file    Years of education: Not on file    Highest education level: Not on file   Occupational History    Not on file   Social Needs    Financial resource strain: Not on file    Food insecurity     Worry: Not on file     Inability: Not on file    Transportation needs     Medical: Not on file     Non-medical: Not on file   Tobacco Use    Smoking status: Never Smoker    Smokeless tobacco: Never Used   Substance and Sexual Activity    Alcohol use: No     Alcohol/week: 0.0 standard drinks    Drug use: No    Sexual activity: Not Currently   Lifestyle    Physical activity     Days per week: Not on file     Minutes per session: Not on file    Stress: Not on file   Relationships    Social connections     Talks on phone: Not on file     Gets together: Not on file     Attends Evangelical service: Not on file     Active member of club or organization: Not on file     Attends meetings of clubs or organizations: Not on file     Relationship status: Not on file    Intimate partner violence     Fear of current or ex partner: Not on file     Emotionally abused: Not on file     Physically abused: Not on file     Forced sexual activity: Not on file   Other Topics Concern    Not on file   Social History Narrative    Not on file       PHYSICAL EXAM    VITAL SIGNS: /63   Pulse 60   Temp 98 °F (36.7 °C) (Oral)   Resp 16   Ht 5' 4\" (1.626 m)   Wt 272 lb (123.4 kg)   SpO2 95%   BMI 46.69 kg/m²   Constitutional:  Well developed, well nourished, no acute distress, non-toxic appearance   Eyes:  Sclera anicteric. HENT:  NC/AT. Ears, nose normal.  Oropharynx moist.  Neck:  Supple. Respiratory:  Lungs CTAB. Cardiovascular:  RRR. GI:  Abdomen soft, obese, tender in RUQ. :  Right CVA tenderness. Musculoskeletal:  No acute deformities. Integument:  Warm and dry. Neurologic:  Alert & oriented. No focal deficits.     LABS/IMAGING    Labs Reviewed   CBC WITH AUTO DIFFERENTIAL - Abnormal; Notable for the following components:       Result Value    Hemoglobin 10.9 (*)     Hematocrit 35.9 (*)     MCH 25.5 (*)     MCHC 30.4 (*)     Lymphocytes % 44.3 (*)     Monocytes % 6.9 (*)     Eosinophils % 4.3 (*)     All other components within normal limits   URINALYSIS - Abnormal; Notable for the following components:    Clarity, UA HAZY (*)     Bilirubin Urine MODERATE (*)     Bacteria, UA RARE (*)     Mucus, UA MANY (*)     All other components within normal limits   ICTOTEST, URINE   COMPREHENSIVE METABOLIC PANEL   LIPASE     Mri Abdomen Wo Contrast Mrcp    Result Date: 9/25/2020  EXAMINATION: MRI OF THE ABDOMEN WITHOUT CONTRAST AND MRCP 9/25/2020 12:41 pm TECHNIQUE: Multiplanar multisequence MRI of the abdomen was performed without the administration of intravenous contrast.  After initial T2 axial and coronal images, thick slab, thin slab and 3D coronal MRCP sequences were obtained without the administration of intravenous contrast.  MIP images are provided for review. COMPARISON: 08/17/2020 CT HISTORY: ORDERING SYSTEM PROVIDED HISTORY: check bile duct, chronic air TECHNOLOGIST PROVIDED HISTORY: Reason for exam:->check bile duct, chronic air Is the patient pregnant?->No Reason for Exam: check bile duct, chronic air; pt states abd pain Acuity: Acute Type of Exam: Initial Relevant Medical/Surgical History: none FINDINGS: Gallbladder: The gallbladder is surgically absent Bile Ducts: Artifact from air in the intrahepatic biliary ducts corresponding to known pneumobilia observed on several prior imaging studies. Due to the presence of air, evaluation of the common bile duct is very limited, not fully visualized with MRCP technique. The distal common bile duct measures 10 mm which does appear relatively stable from recent imaging. Pancreatic Duct: The pancreatic duct is normal. Other: There are no other significant findings in the abdomen. 1. Technically limited MRCP due to pneumobilia. This obscures signal within the common bile duct. 2. Stable chronic biliary dilatation of the CBD which is likely physiologic given a prior history of cholecystectomy. Fl Ugi    Result Date: 9/24/2020  EXAMINATION: SINGLE CONTRAST UPPER GI SERIES 9/24/2020 HISTORY: ORDERING SYSTEM PROVIDED HISTORY: abd pain TECHNOLOGIST PROVIDED HISTORY: No small bowel follow through Reason for exam:->abd pain Reason for Exam: abd pain Similar exam 07/06/2020 COMPARISON: None. TECHNIQUE: Multiple single contrast images of the esophagus, gastroesophageal junction and stomach were obtained following the oral administration of water soluble contrast FLUOROSCOPY DOSE AND TYPE OR TIME AND EXPOSURES: Air Kerma 24.8 mGy FINDINGS: There is some esophageal dysmotility. Contrast passes into the stomach without difficulty. Postsurgical changes from sleeve gastrectomy appear unchanged. No area of stenosis. No leak. Contrast passes into the duodenum without difficulty. Postsurgical changes from sleeve gastrectomy without evidence of acute complication. No stricture or leak. ED COURSE & MEDICAL DECISION MAKING    Pertinent Labs & Imaging studies reviewed. (See chart for details)   -  Patient seen and evaluated in the emergency department. -  Triage and nursing notes reviewed and incorporated. -  Old chart records reviewed and incorporated. -  Supervising physician was Dr. Bhupendra Scanlon. Patient was seen independently. -  Differential diagnosis includes:  appendicitis, gastritis, bowel obstruction, cholecystitis/cholelithiasis, diverticulitis, incarcerated hernia, pancreatitis, performed bowel, uti, and others   -  Work-up included:  see above  -  ED treatment included:  NS, Zofran, Bentyl, morphine  -  Labs are unremarkable. No electrolyte disturbances. Renal function, lipase, LFTs are WNL. No leukocytosis. UA without evidence of UTI. She is pending CT at the end of my shift. Signed out to SinDelantalCYRIL. Please see his note for further details, including final disposition. In light of current events, I did utilize appropriate PPE (including surgical face mask, safety glasses, and gloves, as recommended by the health facility/national standard best practice, during my bedside interactions with the patient. FINAL IMPRESSION    1. Abdominal pain  2.   Nausea and vomiting          Rosendo Simpson PA-C  10/17/20 [id number]

## 2020-10-17 NOTE — PROGRESS NOTES
Pt arrived to room 1120. Pt is A&OX4. Pt is independent in the room and with ADL's. Pt has vomited about 100mL of green emesis. Pt needs IV pain medication ordered. Pt stated Zofran hasn't helped her nausea and requesting IV Phenergan instead. Nurse Simone Lopez @2600  Two person skin check done with Ale Butts. No skin issues to note.  Admission completed

## 2020-10-17 NOTE — ED TRIAGE NOTES
Pt to ED from home. Pt states she is having right sided pain that felt like pancreatitis that started about a week ago. She took percocet at home without relief.

## 2020-10-17 NOTE — PROGRESS NOTES
Pt requested PO Percocet in between Morphine doses.  Pt took that pill fine william still refused to take her other medication      Pt is unable to take oral medication at this time due to vomiting and nausea

## 2020-10-17 NOTE — ED PROVIDER NOTES
5:59 AM EDT  Andre Pope was checked out to me by Herbert Vazquez PA-C. Please see her initial documentation for details of the patient's ED presentation, physical exam and completed studies. At time of patient signout, CT pending. In brief, Jasmyn Lopez presented for abdominal pain for the past week and 1 day of vomiting. Pain is located in the right upper quadrant and radiates in the back. Patient follows with Dr. David oRa and Dr. Rene Gunter. Patient states she has Percocet at home but this is not helping due to her continued vomiting.     I have reviewed and interpreted all of the currently available lab/imaging results from this visit (if applicable):  LABS:  Results for orders placed or performed during the hospital encounter of 10/17/20   Comprehensive Metabolic Panel   Result Value Ref Range    Sodium 140 135 - 145 MMOL/L    Potassium 4.6 3.5 - 5.1 MMOL/L    Chloride 109 99 - 110 mMol/L    CO2 24 21 - 32 MMOL/L    BUN 17 6 - 23 MG/DL    CREATININE 0.7 0.6 - 1.1 MG/DL    Glucose 87 70 - 99 MG/DL    Calcium 10.1 8.3 - 10.6 MG/DL    Alb 4.1 3.4 - 5.0 GM/DL    Total Protein 6.6 6.4 - 8.2 GM/DL    Total Bilirubin 0.2 0.0 - 1.0 MG/DL    ALT 17 10 - 40 U/L    AST 18 15 - 37 IU/L    Alkaline Phosphatase 91 40 - 128 IU/L    GFR Non-African American >60 >60 mL/min/1.73m2    GFR African American >60 >60 mL/min/1.73m2    Anion Gap 7 4 - 16   CBC Auto Differential   Result Value Ref Range    WBC 4.9 4.0 - 10.5 K/CU MM    RBC 4.27 4.2 - 5.4 M/CU MM    Hemoglobin 10.9 (L) 12.5 - 16.0 GM/DL    Hematocrit 35.9 (L) 37 - 47 %    MCV 84.1 78 - 100 FL    MCH 25.5 (L) 27 - 31 PG    MCHC 30.4 (L) 32.0 - 36.0 %    RDW 13.6 11.7 - 14.9 %    Platelets 053 548 - 112 K/CU MM    MPV 9.8 7.5 - 11.1 FL    Differential Type AUTOMATED DIFFERENTIAL     Segs Relative 43.9 36 - 66 %    Lymphocytes % 44.3 (H) 24 - 44 %    Monocytes % 6.9 (H) 0 - 4 %    Eosinophils % 4.3 (H) 0 - 3 %    Basophils % 0.6 0 - 1 %    Segs Absolute 2.2 K/CU MM    Lymphocytes Absolute 2.2 K/CU MM    Monocytes Absolute 0.3 K/CU MM    Eosinophils Absolute 0.2 K/CU MM    Basophils Absolute 0.0 K/CU MM    Nucleated RBC % 0.0 %    Total Nucleated RBC 0.0 K/CU MM    Total Immature Neutrophil 0.00 K/CU MM    Immature Neutrophil % 0.0 0 - 0.43 %   Lipase   Result Value Ref Range    Lipase 20 13 - 60 IU/L   Urinalysis   Result Value Ref Range    Color, UA YELLOW YELLOW    Clarity, UA HAZY (A) CLEAR    Glucose, Urine NEGATIVE NEGATIVE MG/DL    Bilirubin Urine MODERATE (A) NEGATIVE MG/DL    Ketones, Urine NEGATIVE NEGATIVE MG/DL    Specific Gravity, UA 1.030 1.001 - 1.035    Blood, Urine NEGATIVE NEGATIVE    pH, Urine 5.0 5.0 - 8.0    Protein, UA NEGATIVE NEGATIVE MG/DL    Urobilinogen, Urine NORMAL 0.2 - 1.0 MG/DL    Nitrite Urine, Quantitative NEGATIVE NEGATIVE    Leukocyte Esterase, Urine NEGATIVE NEGATIVE    RBC, UA 1 0 - 6 /HPF    WBC, UA 2 0 - 5 /HPF    Bacteria, UA RARE (A) NEGATIVE /HPF    Squam Epithel, UA 19 /HPF    Trans Epithel, UA <1 /HPF    Mucus, UA MANY (A) NEGATIVE HPF    Trichomonas, UA NONE SEEN NONE SEEN /HPF   ICTOTEST, URINE   Result Value Ref Range    Ictotest POSITIVE          IMAGING:  CT ABDOMEN PELVIS W IV CONTRAST Additional Contrast? None   Preliminary Result   1. No acute findings. MDM:  Patient signed out to me by Johnny Casey, see her note for initial details. Patient was provided morphine, Bentyl, Zofran and IV fluids. CBC shows hemoglobin 10.9 hematocrit of 35.9. CMP within normal limits. Lipase 20. Urinalysis is grossly within normal limits. On my evaluation patient having continued nausea and vomiting, pain. Patient provided IM Phenergan and repeat dose morphine. CT abdomen pelvis with IV contrast no acute process. Patient having intractable vomiting and continued pain, I believe patient require admission for further evaluation and treatment. Consult patient's primary care provider Dr. Sal Ni who accepts admission.     Final Impression:  1. Right upper quadrant abdominal pain    2.  Nausea and vomiting, intractability of vomiting not specified, unspecified vomiting type        (Please note that portions of this note may have been completed with a voice recognition program. Efforts were made to edit the dictations but occasionally words are mis-transcribed.)    CYRIL Jacques PA-C  10/17/20 0821

## 2020-10-17 NOTE — H&P
HISTORY AND PHYSICAL    10/17/2020     Patient Information:    Patient: Alexia Selby     Gender: female  : 1970   Age: 44 y.o. MRN: [id number]        PCP:  Karyna Beebe MD (Tel: [phone] )    Chief complaint:    Chief Complaint   Patient presents with    Abdominal Pain     right sided abd pain; states it feels like pancreatitis           History of Present Illness:  Andre Pope is a 44 y.o. female with morbid obesity , Anxiety , h/o Upper GI bleed few years ago , s/p gastric sleeve on 2019 with mild gastritis , multiple  EGD with MRI and  MRCP on 2020 with non acute result ,gastroparesis with allergy to Reglan  History of pancreatitis , Pt has chronic RUQ abdominal pain associated with nausea, vomiting and she has been admitted multiple  times with same symptoms and a discussion took place for possible biliary stent to be placed as pt had similar pain in th past and it got better significantly after a stent placed , pt reached out to my office couple days ago and she stated that she called DR Nav Mcgee and Dr Fallon Snell offices  . Pt was seen previously in [address] [place] [address] also   SBFT done with no active finding . History obtained from patient .          REVIEW OF SYSTEMS:   Constitutional: Negative for fever,chills . ENT: Negative for rhinorrhea,  sore throat. Respiratory: Negative for cough, shortness of breath,wheezing  Cardiovascular: Negative for chest pain, palpitations   Gastrointestinal: +  for Abdominal pain, nausea, vomiting, diarrhea  Genitourinary: Negative for polyuria, dysuria   Hematologic/Lymphatic: Negative for bleeding tendency, easy bruising  Musculoskeletal: Negative for myalgias and arthralgias  Neurologic: Negative for confusion,dysarthria. Skin: Negative for itching,rash  Psychiatric: Negative for depression,anxiety, agitation. Endocrine: Negative for polydipsia,polyuria,heat /cold intolerance.     Past Medical History:   has a past medical history of Acid reflux, Anemia, Anxiety, Arthritis, Bleeding ulcer, Bronchitis, Chronic back pain, Chronic pain, COPD (chronic obstructive pulmonary disease) ([facility] Depression, Fibromyalgia, H/O echocardiogram, H/O echocardiogram, Hiatal hernia, History of blood transfusion, [place] (hard of hearing), Hx of cardiac catheterization, Hx of transesophageal echocardiography (PILO) for monitoring, HX OTHER MEDICAL, HX OTHER MEDICAL, Hypertension, Kidney cysts, Lupus ([facility] MDRO (multiple drug resistant organisms) resistance, Morbid obesity ([facility] MRSA bacteremia, Nausea, Pain management, Pancreatitis chronic, Pneumonia, Shortness of breath on exertion, Shortness of breath on exertion, Sleep apnea, Staph infection, Staph infection, Teeth missing, Thyroid disease, and Wears glasses. Past Surgical History:   has a past surgical history that includes Lung removal, partial (Left, );  section (1991); Foot surgery (Left, Last Done In ); Dental surgery; Cholecystectomy, laparoscopic (7494'X); other surgical history (1998); other surgical history (Last Done 7-15-16); Tonsillectomy and adenoidectomy (); Appendectomy (1998); Upper gastrointestinal endoscopy (2018); Lap Band (~); Hysterectomy (10/1997); Endoscopy, colon, diagnostic (Last Done In 2018); Colonoscopy (Last Done In 's); Cardiac catheterization (10/24/2010); Sleeve Gastrectomy (N/A, 2019); hiatal hernia repair (N/A, 2019); Upper gastrointestinal endoscopy (N/A, 2019); Foot Debridement (Left, 2019); Upper gastrointestinal endoscopy (N/A, 2019); Catheter Removal (N/A, 2019); Upper gastrointestinal endoscopy (N/A, 2019); INSERTION / REMOVAL / REPLACEMENT VENOUS ACCESS CATHETER (N/A, 8/15/2019); Upper gastrointestinal endoscopy (N/A, 10/14/2019); [address] Surgery (N/A, 1/15/2020); Port Surgery (N/A, 2020); and Upper gastrointestinal endoscopy (N/A, 2020). Medications:  No current facility-administered medications on file prior to encounter. Current Outpatient Medications on File Prior to Encounter   Medication Sig Dispense Refill    meclizine (ANTIVERT) 25 MG tablet Take 1 tablet by mouth 3 times daily as needed for Dizziness 30 tablet 2    sucralfate (CARAFATE) 1 GM tablet Take 1 tablet by mouth every 12 hours 120 tablet 3    albuterol sulfate  (90 Base) MCG/ACT inhaler       ondansetron (ZOFRAN ODT) 4 MG disintegrating tablet Take 1 tablet by mouth every 8 hours as needed for Nausea or Vomiting 30 tablet 5    levETIRAcetam (KEPPRA) 1000 MG tablet Take 1 tablet by mouth 2 times daily 60 tablet 5    pantoprazole (PROTONIX) 40 MG tablet Take 1 tablet by mouth every morning (before breakfast) 90 tablet 1    dicyclomine (BENTYL) 10 MG capsule Take 1 capsule by mouth 3 times daily as needed (abdominal pain) 21 capsule 3    oxyCODONE-acetaminophen (PERCOCET) 5-325 MG per tablet Take 1 tablet by mouth. Every 4-6 hours PRN      Cholecalciferol (VITAMIN D3) 5000 units TABS Take 1 tablet by mouth daily      estradiol (ESTRACE) 0.5 MG tablet Take 0.5 mg by mouth daily      atenolol (TENORMIN) 25 MG tablet Take 25 mg by mouth daily      Multiple Vitamin (MVI, BARIATRIC ADVANTAGE MULTI-FORMULA, CHEW TAB) Take 1 tablet by mouth daily 30 tablet 5    Calcium Citrate-Vitamin D (CALCIUM + VIT D, BARIATRIC ADVANTAGE, CHEWABLE TABLET) Take 1 tablet by mouth 2 times daily 120 tablet 5    levothyroxine (SYNTHROID) 125 MCG tablet Take 1 tablet by mouth Daily (Patient taking differently: Take 125 mcg by mouth nightly ) 30 tablet 0    gabapentin (NEURONTIN) 800 MG tablet Take 800 mg by mouth 3 times daily      PARoxetine (PAXIL) 30 MG tablet Take 50 mg by mouth nightly          Allergies:   Allergies   Allergen Reactions    Aspirin Palpitations     \"My Heart Rate Elevates\"    Shellfish-Derived Products Swelling    Toradol [Ketorolac Tromethamine] Rash    Compazine [Prochlorperazine]     Reglan [Metoclopramide] Itching        Social History:   reports that she has never smoked. She has never used smokeless tobacco. She reports that she does not drink alcohol or use drugs. Family History:  family history includes Arthritis in her mother; Asthma in her father and son; Cancer in her father; Diabetes in her father and mother; Heart Disease in her mother; High Blood Pressure in her brother, father, and mother; High Cholesterol in her mother; Lupus in her daughter; Obesity in her mother; Other in her daughter; Vision Loss in her mother and son. ,     Physical Exam:  /76   Pulse 59   Temp 98 °F (36.7 °C) (Oral)   Resp 16   Ht 5' 4\" (1.626 m)   Wt 272 lb (123.4 kg)   SpO2 98%   BMI 46.69 kg/m²     General appearance:  ++ distress . Well nourished  Eyes: Sclera clear, pupils equal  Cardiovascular: Regular rhythm, normal S1, S2. No edema in lower extremities  Respiratory: Clear to auscultation bilaterally, no wheeze, good inspiratory effort  Gastrointestinal: Abdomen soft, epigastric and RUQ tenderness, not distended, normal bowel sounds  Musculoskeletal: No cyanosis in digits, neck supple  Neurology: Cranial nerves grossly intact. Alert and oriented . No speech or motor deficits  Psychiatry: Appropriate affect.  Not agitated  Skin: Warm, dry, normal turgor, no rash    Labs:  CBC:   Lab Results   Component Value Date    WBC 4.9 10/17/2020    RBC 4.27 10/17/2020    HGB 10.9 10/17/2020    HCT 35.9 10/17/2020    MCV 84.1 10/17/2020    MCH 25.5 10/17/2020    MCHC 30.4 10/17/2020    RDW 13.6 10/17/2020     10/17/2020    MPV 9.8 10/17/2020     BMP:    Lab Results   Component Value Date     10/17/2020    K 4.6 10/17/2020     10/17/2020    CO2 24 10/17/2020    BUN 17 10/17/2020    CREATININE 0.7 10/17/2020    CALCIUM 10.1 10/17/2020    GFRAA >60 10/17/2020    LABGLOM >60 10/17/2020    GLUCOSE 87 10/17/2020       Chest Xray:   EKG:    I visualized CXR images and EKG strips      Patient Active Problem List   Diagnosis Code    Fibromyalgia M79.7    Lupus (systemic lupus erythematosus) (HCC) M32.9    Chronic pancreatitis ([facility] K86.1    HTN (hypertension) I10    Generalized abdominal pain R10.84    Frequent UTI N39.0    Gastroesophageal reflux disease without esophagitis K21.9    Depression F32.9    Fatty liver disease, nonalcoholic V92.8    Arthritis M19.90    Bilateral low back pain with left-sided sciatica M54.42    Intractable vomiting with nausea R11.2    Hiatal hernia K44.9    Status post laparoscopic sleeve gastrectomy Z98.84    Pseudoseizure F44.5    Chronic pain syndrome G89.4    Drug-seeking/Aberrant behavior Z76.5    Lymph node disorder I89.9    Morbid obesity with BMI of 40.0-44.9, adult ([facility]) E66.01, Z68.41    Iron deficiency anemia secondary to blood loss (chronic) D50.0    Encounter for weight management Z76.89    Intractable nausea and vomiting R11.2    History of Jet-en-Y gastric bypass Z98.84    Seizure ([facility]) R56.9    Abdominal pain R10.9    Anxiety F41.9    RUQ pain R10.11    Intractable vomiting R11.10         Active Hospital Problems    Diagnosis    Intractable vomiting [R11.10]   acute on chronic pain    s/p gastric sleeve on 2/12/2019  Anxiety  h/o Upper GI bleed few years ago   mild gastritis ,   Gastroparesis with allergy to Reglan   History of pancreatitis  Seizure vs pseudoseizure   Chronic Anemia   Morbid obesity       Assessment/Plan:   Tele   IVF  Percocet PO, Morphine IV  Zofran, Phenergan   Consider GI consult for Dr Angelo Sánchez meds , reviewed and resumed as appropriate   Symptoms releif/Pain control  DVT proph           Karyna Beebe MD    10/17/2020 9:28 AM

## 2020-10-18 LAB
ANION GAP SERPL CALCULATED.3IONS-SCNC: 7 MMOL/L (ref 4–16)
BASOPHILS ABSOLUTE: 0 K/CU MM
BASOPHILS RELATIVE PERCENT: 0.8 % (ref 0–1)
BUN BLDV-MCNC: 13 MG/DL (ref 6–23)
CALCIUM SERPL-MCNC: 9.4 MG/DL (ref 8.3–10.6)
CHLORIDE BLD-SCNC: 108 MMOL/L (ref 99–110)
CO2: 24 MMOL/L (ref 21–32)
CREAT SERPL-MCNC: 0.8 MG/DL (ref 0.6–1.1)
DIFFERENTIAL TYPE: ABNORMAL
EOSINOPHILS ABSOLUTE: 0.1 K/CU MM
EOSINOPHILS RELATIVE PERCENT: 3.6 % (ref 0–3)
GFR AFRICAN AMERICAN: >60 ML/MIN/1.73M2
GFR NON-AFRICAN AMERICAN: >60 ML/MIN/1.73M2
GLUCOSE BLD-MCNC: 78 MG/DL (ref 70–99)
HCT VFR BLD CALC: 33.9 % (ref 37–47)
HEMOGLOBIN: 10.3 GM/DL (ref 12.5–16)
IMMATURE NEUTROPHIL %: 0.3 % (ref 0–0.43)
LYMPHOCYTES ABSOLUTE: 1 K/CU MM
LYMPHOCYTES RELATIVE PERCENT: 26.5 % (ref 24–44)
MCH RBC QN AUTO: 25.8 PG (ref 27–31)
MCHC RBC AUTO-ENTMCNC: 30.4 % (ref 32–36)
MCV RBC AUTO: 84.8 FL (ref 78–100)
MONOCYTES ABSOLUTE: 0.3 K/CU MM
MONOCYTES RELATIVE PERCENT: 7.5 % (ref 0–4)
NUCLEATED RBC %: 0 %
PDW BLD-RTO: 13.7 % (ref 11.7–14.9)
PLATELET # BLD: 170 K/CU MM (ref 140–440)
PMV BLD AUTO: 10.4 FL (ref 7.5–11.1)
POTASSIUM SERPL-SCNC: 4.3 MMOL/L (ref 3.5–5.1)
RBC # BLD: 4 M/CU MM (ref 4.2–5.4)
SEGMENTED NEUTROPHILS ABSOLUTE COUNT: 2.2 K/CU MM
SEGMENTED NEUTROPHILS RELATIVE PERCENT: 61.3 % (ref 36–66)
SODIUM BLD-SCNC: 139 MMOL/L (ref 135–145)
TOTAL IMMATURE NEUTOROPHIL: 0.01 K/CU MM
TOTAL NUCLEATED RBC: 0 K/CU MM
WBC # BLD: 3.6 K/CU MM (ref 4–10.5)

## 2020-10-18 PROCEDURE — 6360000002 HC RX W HCPCS: Performed by: FAMILY MEDICINE

## 2020-10-18 PROCEDURE — G0378 HOSPITAL OBSERVATION PER HR: HCPCS

## 2020-10-18 PROCEDURE — 85025 COMPLETE CBC W/AUTO DIFF WBC: CPT

## 2020-10-18 PROCEDURE — 6370000000 HC RX 637 (ALT 250 FOR IP): Performed by: FAMILY MEDICINE

## 2020-10-18 PROCEDURE — 80048 BASIC METABOLIC PNL TOTAL CA: CPT

## 2020-10-18 PROCEDURE — 94761 N-INVAS EAR/PLS OXIMETRY MLT: CPT

## 2020-10-18 PROCEDURE — 96376 TX/PRO/DX INJ SAME DRUG ADON: CPT

## 2020-10-18 PROCEDURE — 96372 THER/PROPH/DIAG INJ SC/IM: CPT

## 2020-10-18 PROCEDURE — 96375 TX/PRO/DX INJ NEW DRUG ADDON: CPT

## 2020-10-18 PROCEDURE — 2580000003 HC RX 258: Performed by: FAMILY MEDICINE

## 2020-10-18 RX ADMIN — SODIUM CHLORIDE: 9 INJECTION, SOLUTION INTRAVENOUS at 16:38

## 2020-10-18 RX ADMIN — LEVETIRACETAM 1000 MG: 500 TABLET, FILM COATED ORAL at 21:18

## 2020-10-18 RX ADMIN — ONDANSETRON 4 MG: 2 INJECTION INTRAMUSCULAR; INTRAVENOUS at 05:19

## 2020-10-18 RX ADMIN — PROMETHAZINE HYDROCHLORIDE 12.5 MG: 25 INJECTION INTRAMUSCULAR; INTRAVENOUS at 21:17

## 2020-10-18 RX ADMIN — HYDROMORPHONE HYDROCHLORIDE 0.5 MG: 1 INJECTION, SOLUTION INTRAMUSCULAR; INTRAVENOUS; SUBCUTANEOUS at 21:15

## 2020-10-18 RX ADMIN — TIZANIDINE 4 MG: 4 TABLET ORAL at 21:18

## 2020-10-18 RX ADMIN — GABAPENTIN 800 MG: 400 CAPSULE ORAL at 10:08

## 2020-10-18 RX ADMIN — Medication 5000 UNITS: at 10:08

## 2020-10-18 RX ADMIN — LEVOTHYROXINE SODIUM 200 MCG: 100 TABLET ORAL at 05:15

## 2020-10-18 RX ADMIN — ONDANSETRON 4 MG: 2 INJECTION INTRAMUSCULAR; INTRAVENOUS at 13:55

## 2020-10-18 RX ADMIN — OXYCODONE HYDROCHLORIDE AND ACETAMINOPHEN 1 TABLET: 5; 325 TABLET ORAL at 01:00

## 2020-10-18 RX ADMIN — CALCIUM CARBONATE-CHOLECALCIFEROL TAB 250 MG-125 UNIT 250 MG: 250-125 TAB at 10:07

## 2020-10-18 RX ADMIN — PROMETHAZINE HYDROCHLORIDE 12.5 MG: 25 INJECTION INTRAMUSCULAR; INTRAVENOUS at 02:22

## 2020-10-18 RX ADMIN — MORPHINE SULFATE 2 MG: 2 INJECTION, SOLUTION INTRAMUSCULAR; INTRAVENOUS at 02:20

## 2020-10-18 RX ADMIN — PAROXETINE HYDROCHLORIDE 50 MG: 10 TABLET, FILM COATED ORAL at 21:18

## 2020-10-18 RX ADMIN — HYDROMORPHONE HYDROCHLORIDE 0.5 MG: 1 INJECTION, SOLUTION INTRAMUSCULAR; INTRAVENOUS; SUBCUTANEOUS at 17:07

## 2020-10-18 RX ADMIN — HYDROMORPHONE HYDROCHLORIDE 0.5 MG: 1 INJECTION, SOLUTION INTRAMUSCULAR; INTRAVENOUS; SUBCUTANEOUS at 13:56

## 2020-10-18 RX ADMIN — MULTIVITAMIN 15 ML: LIQUID ORAL at 10:07

## 2020-10-18 RX ADMIN — MORPHINE SULFATE 2 MG: 2 INJECTION, SOLUTION INTRAMUSCULAR; INTRAVENOUS at 07:46

## 2020-10-18 RX ADMIN — PANTOPRAZOLE SODIUM 40 MG: 40 TABLET, DELAYED RELEASE ORAL at 05:15

## 2020-10-18 RX ADMIN — ONDANSETRON 4 MG: 2 INJECTION INTRAMUSCULAR; INTRAVENOUS at 17:07

## 2020-10-18 RX ADMIN — LEVETIRACETAM 1000 MG: 500 TABLET, FILM COATED ORAL at 10:08

## 2020-10-18 RX ADMIN — ATENOLOL 25 MG: 25 TABLET ORAL at 10:08

## 2020-10-18 RX ADMIN — GUAIFENESIN 1200 MG: 600 TABLET, EXTENDED RELEASE ORAL at 10:08

## 2020-10-18 RX ADMIN — ZOLPIDEM TARTRATE 5 MG: 5 TABLET ORAL at 22:15

## 2020-10-18 RX ADMIN — PROMETHAZINE HYDROCHLORIDE 12.5 MG: 25 INJECTION INTRAMUSCULAR; INTRAVENOUS at 07:46

## 2020-10-18 RX ADMIN — FAMOTIDINE 20 MG: 20 TABLET, FILM COATED ORAL at 10:08

## 2020-10-18 RX ADMIN — ENOXAPARIN SODIUM 40 MG: 40 INJECTION SUBCUTANEOUS at 07:45

## 2020-10-18 RX ADMIN — GABAPENTIN 800 MG: 400 CAPSULE ORAL at 21:17

## 2020-10-18 RX ADMIN — SUCRALFATE 1 G: 1 TABLET ORAL at 10:08

## 2020-10-18 RX ADMIN — ESTRADIOL 0.5 MG: 1 TABLET ORAL at 10:07

## 2020-10-18 ASSESSMENT — PAIN DESCRIPTION - LOCATION
LOCATION: ABDOMEN

## 2020-10-18 ASSESSMENT — PAIN DESCRIPTION - ORIENTATION
ORIENTATION: RIGHT;LEFT
ORIENTATION: RIGHT
ORIENTATION: RIGHT;LEFT

## 2020-10-18 ASSESSMENT — PAIN SCALES - GENERAL
PAINLEVEL_OUTOF10: 9
PAINLEVEL_OUTOF10: 7
PAINLEVEL_OUTOF10: 5
PAINLEVEL_OUTOF10: 7
PAINLEVEL_OUTOF10: 8
PAINLEVEL_OUTOF10: 10
PAINLEVEL_OUTOF10: 7
PAINLEVEL_OUTOF10: 6
PAINLEVEL_OUTOF10: 0
PAINLEVEL_OUTOF10: 7

## 2020-10-18 ASSESSMENT — PAIN DESCRIPTION - FREQUENCY
FREQUENCY: CONTINUOUS
FREQUENCY: CONTINUOUS

## 2020-10-18 ASSESSMENT — PAIN DESCRIPTION - DESCRIPTORS
DESCRIPTORS: CONSTANT;STABBING
DESCRIPTORS: CONSTANT;STABBING

## 2020-10-18 ASSESSMENT — PAIN DESCRIPTION - PAIN TYPE
TYPE: CHRONIC PAIN
TYPE: ACUTE PAIN
TYPE: ACUTE PAIN;CHRONIC PAIN

## 2020-10-18 NOTE — PROGRESS NOTES
Attending Progress Note      PCP: Jean Paul Carvalho MD      Patient: Anoop Jim   Gender: female  : 1970   Age: 44 y.o. MRN: [id number]  1120/1120-A      Date of Admission: 10/17/2020    Chief Complaint:   Chief Complaint   Patient presents with    Abdominal Pain     right sided abd pain; states it feels like pancreatitis           Subjective: abd pain . Rawleigh Billing nausea and vomiting . ..          Medications:  Reviewed  Infusion Medications    sodium chloride 75 mL/hr at 10/17/20 2319     Scheduled Medications    sodium chloride flush  10 mL Intravenous BID    PARoxetine  50 mg Oral Nightly    gabapentin  800 mg Oral TID    CENTRUM/CERTA-LILLY with minerals oral  15 mL Oral Daily    oyster shell calcium/vitamin D  1 tablet Oral BID    estradiol  0.5 mg Oral Daily    atenolol  25 mg Oral Daily    vitamin D  5,000 Units Oral Daily    pantoprazole  40 mg Oral QAM AC    levETIRAcetam  1,000 mg Oral BID    sucralfate  1 g Oral 2 times per day    sodium chloride flush  10 mL Intravenous 2 times per day    famotidine  20 mg Oral BID    nicotine  1 patch Transdermal Daily    enoxaparin  40 mg Subcutaneous Daily    guaiFENesin  1,200 mg Oral BID    levothyroxine  200 mcg Oral Daily     PRN Meds: HYDROmorphone, dicyclomine, ondansetron, meclizine, sodium chloride flush, potassium chloride **OR** potassium alternative oral replacement **OR** potassium chloride, magnesium sulfate, acetaminophen **OR** acetaminophen, polyethylene glycol, fleet, promethazine **OR** ondansetron, zolpidem, calcium carbonate, tiZANidine, benzonatate, oxyCODONE-acetaminophen, promethazine      Intake/Output Summary (Last 24 hours) at 10/18/2020 1625  Last data filed at 10/18/2020 1057  Gross per 24 hour   Intake 819 ml   Output 2325 ml   Net -1506 ml       Exam:  /60   Pulse 70   Temp 98.4 °F (36.9 °C) (Oral)   Resp 16   Ht 5' 4\" (1.626 m)   Wt 290 lb (131.5 kg)   SpO2 100%   BMI 49.78 kg/m²   General appearance: No distress,   Respiratory:  good air entry , no Rales , No wheezing, or rhonchi,  Cardiovascular: RRR, with normal S1/S2 . Abdomen : Soft, non-tender, non-distended  , normal bowel sounds. Legs : No edema bilaterally. No DVT signs ,    Neurologic:  Alert and oriented ,        Labs:   Recent Labs     10/17/20  0425 10/18/20  0530   WBC 4.9 3.6*   HGB 10.9* 10.3*   HCT 35.9* 33.9*    170     Recent Labs     10/17/20  0425 10/18/20  0530    139   K 4.6 4.3    108   CO2 24 24   BUN 17 13   CREATININE 0.7 0.8   CALCIUM 10.1 9.4     Recent Labs     10/17/20  0425   AST 18   ALT 17   BILITOT 0.2   ALKPHOS 91     No results for input(s): INR in the last 72 hours. No results for input(s): [place] in the last 72 hours. Assessment/Plan:    Active Hospital Problems    Diagnosis Date Noted    Intractable vomiting [R11.10] 10/17/2020   acute on chronic pain    s/p gastric sleeve on 2/12/2019  Anxiety  h/o Upper GI bleed few years ago   mild gastritis ,   Gastroparesis with allergy to Reglan   History of pancreatitis  Seizure vs pseudoseizure   Chronic Anemia   Morbid obesity         Assessment/Plan:   Tele   IVF  Percocet PO, Morphine IV  Zofran, Phenergan   Consider GI consult for Dr Carlota riddle , reviewed and resumed as appropriate   Symptoms releif/Pain control  DVT proph           DVT Prophylaxis   Diet: DIET FULL LIQUID;  Code Status: Full Code    Treatment progress and plan was d/w pt/family .         Hortencia Carlson MD

## 2020-10-19 LAB
HCT VFR BLD CALC: 32.5 % (ref 37–47)
HEMOGLOBIN: 9.8 GM/DL (ref 12.5–16)
MCH RBC QN AUTO: 25.8 PG (ref 27–31)
MCHC RBC AUTO-ENTMCNC: 30.2 % (ref 32–36)
MCV RBC AUTO: 85.5 FL (ref 78–100)
PDW BLD-RTO: 13.8 % (ref 11.7–14.9)
PLATELET # BLD: 145 K/CU MM (ref 140–440)
PMV BLD AUTO: 10.1 FL (ref 7.5–11.1)
RBC # BLD: 3.8 M/CU MM (ref 4.2–5.4)
WBC # BLD: 2.8 K/CU MM (ref 4–10.5)

## 2020-10-19 PROCEDURE — 96372 THER/PROPH/DIAG INJ SC/IM: CPT

## 2020-10-19 PROCEDURE — 6360000002 HC RX W HCPCS: Performed by: FAMILY MEDICINE

## 2020-10-19 PROCEDURE — 96376 TX/PRO/DX INJ SAME DRUG ADON: CPT

## 2020-10-19 PROCEDURE — 94761 N-INVAS EAR/PLS OXIMETRY MLT: CPT

## 2020-10-19 PROCEDURE — 6370000000 HC RX 637 (ALT 250 FOR IP): Performed by: FAMILY MEDICINE

## 2020-10-19 PROCEDURE — G0378 HOSPITAL OBSERVATION PER HR: HCPCS

## 2020-10-19 PROCEDURE — 85027 COMPLETE CBC AUTOMATED: CPT

## 2020-10-19 PROCEDURE — 2580000003 HC RX 258: Performed by: FAMILY MEDICINE

## 2020-10-19 RX ADMIN — SODIUM CHLORIDE, PRESERVATIVE FREE 10 ML: 5 INJECTION INTRAVENOUS at 08:56

## 2020-10-19 RX ADMIN — GABAPENTIN 800 MG: 400 CAPSULE ORAL at 14:38

## 2020-10-19 RX ADMIN — ENOXAPARIN SODIUM 40 MG: 40 INJECTION SUBCUTANEOUS at 09:01

## 2020-10-19 RX ADMIN — LEVETIRACETAM 1000 MG: 500 TABLET, FILM COATED ORAL at 21:43

## 2020-10-19 RX ADMIN — SODIUM CHLORIDE, PRESERVATIVE FREE 10 ML: 5 INJECTION INTRAVENOUS at 21:42

## 2020-10-19 RX ADMIN — PROMETHAZINE HYDROCHLORIDE 12.5 MG: 25 INJECTION INTRAMUSCULAR; INTRAVENOUS at 03:44

## 2020-10-19 RX ADMIN — CALCIUM CARBONATE-CHOLECALCIFEROL TAB 250 MG-125 UNIT 250 MG: 250-125 TAB at 08:58

## 2020-10-19 RX ADMIN — HYDROMORPHONE HYDROCHLORIDE 0.5 MG: 1 INJECTION, SOLUTION INTRAMUSCULAR; INTRAVENOUS; SUBCUTANEOUS at 01:18

## 2020-10-19 RX ADMIN — SUCRALFATE 1 G: 1 TABLET ORAL at 21:42

## 2020-10-19 RX ADMIN — HYDROMORPHONE HYDROCHLORIDE 0.5 MG: 1 INJECTION, SOLUTION INTRAMUSCULAR; INTRAVENOUS; SUBCUTANEOUS at 05:46

## 2020-10-19 RX ADMIN — ONDANSETRON 4 MG: 2 INJECTION INTRAMUSCULAR; INTRAVENOUS at 01:16

## 2020-10-19 RX ADMIN — ONDANSETRON 4 MG: 2 INJECTION INTRAMUSCULAR; INTRAVENOUS at 08:55

## 2020-10-19 RX ADMIN — HYDROMORPHONE HYDROCHLORIDE 0.5 MG: 1 INJECTION, SOLUTION INTRAMUSCULAR; INTRAVENOUS; SUBCUTANEOUS at 14:38

## 2020-10-19 RX ADMIN — FAMOTIDINE 20 MG: 20 TABLET, FILM COATED ORAL at 08:57

## 2020-10-19 RX ADMIN — GABAPENTIN 800 MG: 400 CAPSULE ORAL at 08:57

## 2020-10-19 RX ADMIN — PROMETHAZINE HYDROCHLORIDE 12.5 MG: 25 INJECTION INTRAMUSCULAR; INTRAVENOUS at 14:39

## 2020-10-19 RX ADMIN — Medication 5000 UNITS: at 08:57

## 2020-10-19 RX ADMIN — ZOLPIDEM TARTRATE 5 MG: 5 TABLET ORAL at 22:10

## 2020-10-19 RX ADMIN — GUAIFENESIN 1200 MG: 600 TABLET, EXTENDED RELEASE ORAL at 08:58

## 2020-10-19 RX ADMIN — LEVETIRACETAM 1000 MG: 500 TABLET, FILM COATED ORAL at 08:58

## 2020-10-19 RX ADMIN — HYDROMORPHONE HYDROCHLORIDE 0.5 MG: 1 INJECTION, SOLUTION INTRAMUSCULAR; INTRAVENOUS; SUBCUTANEOUS at 10:24

## 2020-10-19 RX ADMIN — MULTIVITAMIN 15 ML: LIQUID ORAL at 08:57

## 2020-10-19 RX ADMIN — PROMETHAZINE HYDROCHLORIDE 12.5 MG: 25 INJECTION INTRAMUSCULAR; INTRAVENOUS at 21:42

## 2020-10-19 RX ADMIN — SODIUM CHLORIDE: 9 INJECTION, SOLUTION INTRAVENOUS at 22:10

## 2020-10-19 RX ADMIN — HYDROMORPHONE HYDROCHLORIDE 0.5 MG: 1 INJECTION, SOLUTION INTRAMUSCULAR; INTRAVENOUS; SUBCUTANEOUS at 21:40

## 2020-10-19 RX ADMIN — PAROXETINE HYDROCHLORIDE 50 MG: 10 TABLET, FILM COATED ORAL at 22:40

## 2020-10-19 RX ADMIN — ATENOLOL 25 MG: 25 TABLET ORAL at 08:58

## 2020-10-19 RX ADMIN — OXYCODONE HYDROCHLORIDE AND ACETAMINOPHEN 1 TABLET: 5; 325 TABLET ORAL at 08:57

## 2020-10-19 RX ADMIN — GUAIFENESIN 1200 MG: 600 TABLET, EXTENDED RELEASE ORAL at 21:43

## 2020-10-19 RX ADMIN — LEVOTHYROXINE SODIUM 200 MCG: 100 TABLET ORAL at 05:55

## 2020-10-19 RX ADMIN — OXYCODONE HYDROCHLORIDE AND ACETAMINOPHEN 1 TABLET: 5; 325 TABLET ORAL at 18:47

## 2020-10-19 RX ADMIN — SODIUM CHLORIDE: 9 INJECTION, SOLUTION INTRAVENOUS at 04:26

## 2020-10-19 RX ADMIN — ESTRADIOL 0.5 MG: 1 TABLET ORAL at 08:58

## 2020-10-19 RX ADMIN — GABAPENTIN 800 MG: 400 CAPSULE ORAL at 21:43

## 2020-10-19 RX ADMIN — SUCRALFATE 1 G: 1 TABLET ORAL at 08:57

## 2020-10-19 RX ADMIN — PANTOPRAZOLE SODIUM 40 MG: 40 TABLET, DELAYED RELEASE ORAL at 05:55

## 2020-10-19 RX ADMIN — TIZANIDINE 4 MG: 4 TABLET ORAL at 22:10

## 2020-10-19 RX ADMIN — FAMOTIDINE 20 MG: 20 TABLET, FILM COATED ORAL at 21:42

## 2020-10-19 ASSESSMENT — PAIN DESCRIPTION - ONSET
ONSET: ON-GOING
ONSET: ON-GOING

## 2020-10-19 ASSESSMENT — PAIN DESCRIPTION - DIRECTION: RADIATING_TOWARDS: BACK

## 2020-10-19 ASSESSMENT — PAIN SCALES - GENERAL
PAINLEVEL_OUTOF10: 4
PAINLEVEL_OUTOF10: 4
PAINLEVEL_OUTOF10: 8
PAINLEVEL_OUTOF10: 0
PAINLEVEL_OUTOF10: 7
PAINLEVEL_OUTOF10: 7
PAINLEVEL_OUTOF10: 5
PAINLEVEL_OUTOF10: 7
PAINLEVEL_OUTOF10: 5
PAINLEVEL_OUTOF10: 7
PAINLEVEL_OUTOF10: 0
PAINLEVEL_OUTOF10: 7

## 2020-10-19 ASSESSMENT — PAIN DESCRIPTION - PROGRESSION
CLINICAL_PROGRESSION: NOT CHANGED
CLINICAL_PROGRESSION: GRADUALLY WORSENING

## 2020-10-19 ASSESSMENT — PAIN DESCRIPTION - FREQUENCY
FREQUENCY: CONTINUOUS
FREQUENCY: CONTINUOUS

## 2020-10-19 ASSESSMENT — PAIN DESCRIPTION - ORIENTATION
ORIENTATION: RIGHT;LEFT
ORIENTATION: RIGHT;UPPER

## 2020-10-19 ASSESSMENT — PAIN DESCRIPTION - PAIN TYPE
TYPE: ACUTE PAIN;CHRONIC PAIN
TYPE: ACUTE PAIN

## 2020-10-19 ASSESSMENT — PAIN DESCRIPTION - DESCRIPTORS
DESCRIPTORS: CONSTANT;STABBING
DESCRIPTORS: ACHING;CONSTANT;SHARP

## 2020-10-19 ASSESSMENT — PAIN - FUNCTIONAL ASSESSMENT
PAIN_FUNCTIONAL_ASSESSMENT: ACTIVITIES ARE NOT PREVENTED
PAIN_FUNCTIONAL_ASSESSMENT: PREVENTS OR INTERFERES SOME ACTIVE ACTIVITIES AND ADLS

## 2020-10-19 ASSESSMENT — PAIN DESCRIPTION - LOCATION
LOCATION: ABDOMEN
LOCATION: ABDOMEN

## 2020-10-19 NOTE — CONSULTS
[address] [place] [address], [address] [place] [address]                                  CONSULTATION    PATIENT NAME: Godfrey Mcwilliams                  :        1970  MED REC NO:   [id number]                          ROOM:       1120  ACCOUNT NO:   [de-identified]                           ADMIT DATE: 10/17/2020  PROVIDER:     King Fidel MD    CONSULT DATE:  10/19/2020    PRIMARY CARE PROVIDER:  Lorrie Luis MD    CHIEF COMPLAINT:  History of chronic intractable right upper quadrant  abdominal pain. HISTORY OF PRESENT ILLNESS:  The patient is a 49-year-old white female,  patient known to me from her multiple previous hospitalizations, with  past medical history significant for history of morbid obesity, status  post sleeve gastrectomy by Dr. Luis Strauss; lupus; hypertension;  depression/anxiety; gastroesophageal reflux disease; peptic ulcer  disease; hypothyroidism; history of gallstones, status post  cholecystectomy, complicated by bile duct injury with at least seven or  eight ERCPs done in [place], [place], and extensive GI workup done  for sphincter of Oddi dysfunction, for which the patient had biliary and  pancreatic stents placed, which were subsequently removed; history of  acute/recurrent/chronic pancreatitis; anemia; chronic back pain;  fibromyalgia; recurrent upper GI bleeding requiring multiple EGDs;  history of seizure disorder; and chronic pain syndrome. The patient was admitted to the hospital on 10/17/2020 with chronic  intractable right upper quadrant abdominal pain along with nausea and  occasional vomiting. There is no history of hematemesis, melena,  hematochezia, anorexia, or weight loss. The blood workup comprising a  chem profile, LFTs, and CBC was unremarkable apart from hemoglobin 10.9. The patient is hemodynamically stable.   The patient did have an  extensive GI workup done in the past including multiple EGDs, at least  three EGDs done by me, the last EGD was on 07/22/2019 with postsurgical  changes noted from previous sleeve gastrectomy and gastritis with old  blood noted. There was no evidence of active peptic ulcer disease. The  patient also subsequently has been followed up regularly by Dr. Saurav Lopez  who has performed at least four EGDs and the first EGD was on  08/27/2018, which showed postsurgical changes from sleeve gastrectomy  along with mild gastritis; second EGD on 03/12/2019 for upper GI  bleeding, which was unremarkable; third EGD on 04/02/2019, which showed  bleeding from the distal staple line, which was injected with  epinephrine solution; and the fourth EGD by Dr. Saurav Lopez was on  06/19/2019 and again was unremarkable. The patient did have a colonoscopy done in the past as well. The  patient also is being followed up by Dr. Lu Huizar for pain management. The patient was admitted at [facility] approximately six to  eight weeks ago for a couple of days for chronic right upper quadrant  abdominal pain and possible ERCP, but no ERCP was performed and the  patient was discharged home to be followed up locally. The patient did  have an MRCP done on 09/25/2020, which was unremarkable. The patient is  hemodynamically stable. REVIEW OF SYSTEMS:  CENTRAL NERVOUS SYSTEM:  The patient denies headache or focal  sensorimotor symptoms. CARDIOVASCULAR SYSTEM:  No history of chest pain, shortness of breath,  or leg swelling. GENITOURINARY SYSTEM:  No history of dysuria, pyuria, or hematuria. MUSCULOSKELETAL SYSTEM:  The patient complains of generalized weakness. RESPIRATORY SYSTEM:  No history of cough, hemoptysis, fever, or chills.     PAST MEDICAL HISTORY:  Significant for history of morbid obesity, status  post sleeve gastrectomy, done by Dr. Saurav Lopez; history of lupus;  hypertension; depression/anxiety; gastroesophageal reflux disease;  peptic ulcer disease; hypothyroidism; history of gallstones, status post  cholecystectomy, complicated by bile duct injury with at least seven or  eight ERCPs done in [place], [place] with extensive workup done for  sphincter of Oddi dysfunction, for which the patient has had  biliary/pancreatic stents placed, which were subsequently removed; also  history of acute/recurrent/chronic pancreatitis; anemia; chronic back  pain; fibromyalgia; history of recurrent episodes of upper GI bleeding,  requiring multiple EGDs; seizure disorder; and chronic pain syndrome. FAMILY HISTORY:  The patient's father was diagnosed with carcinoma of  the prostate, maternal aunt with carcinoma of the breast, maternal  grandmother with carcinoma of the \"stomach,\" and maternal grandfather  with carcinoma of the lung. MEDICATIONS:  Please refer to chart. SOCIOECONOMIC HISTORY:  No history of EtOH abuse. The patient does not  smoke cigarettes. PAST SURGICAL HISTORY:  The patient has had total abdominal  hysterectomy, cholecystectomy, MediPort placed, which got infected and  was removed by Dr. Carmela Zafar recently, appendectomy, tonsillectomy and  adenoidectomy, partial removal of the left lung for benign disease,  dental surgery, , multiple EGDs, and at least one colonoscopy  by Dr. Val Melchor around six years ago, and also seven or eight ERCPs done in  [place], [place] for common bile duct stone/sphincter of Oddi  dysfunction and placement of biliary and pancreatic stents, which were  subsequently removed. The patient also had two EGDs done by Dr. Paige Oleary,  three EGDs by me, and four by Dr. Carmela Zafar.  The patient also has had  surgery done on her left foot for osteomyelitis. ALLERGIES:  The patient is allergic to ASPIRIN, SHELLFISH, TORADOL,  COMPAZINE, and REGLAN. PHYSICAL EXAMINATION:  GENERAL:  Shows a 61-year-old white female who is obese, lying flat in  bed, in no acute distress, but complaining of right upper quadrant  abdominal pain.   She is awake, alert, and oriented and pleasant to talk  with. VITAL SIGNS:  Stable. HEENT:  Shows skull to be atraumatic. NECK:  Supple. CHEST:  Clear. HEART:  S1 and S2 are normal.  ABDOMEN:  Soft, nontender, and nondistended. Liver and spleen are not  palpable. Bowel sounds are present. RECTAL:  Deferred. CNS:  Shows the patient to be awake, alert, and oriented. There are no  focal sensorimotor signs. MUSCULOSKELETAL SYSTEM:  Unremarkable. LABORATORY DATA:  The labs drawn during the present hospitalization  comprised a CBC, chem profile, which is unremarkable except the  hemoglobin is 9.8. The patient did have a CT scan done of the abdomen  and pelvis on 10/17/2020 and no acute findings were noted. IMPRESSION:  A 47-year-old white female with multiple comorbidities,  admitted with chronic intractable right upper quadrant abdominal pain,  etiology to be determined. RECOMMENDATIONS:  1. Agree with present management. 2.  We will continue the patient on Protonix. 3.  We will check CBC, chem profile, amylase, and lipase in a.m.  4.  The patient is requesting surgical followup with Dr. Hernesto Mullins also  for further workup of her chronic intractable right upper quadrant  abdominal pain. 5.  The patient has been instructed to follow up at [facility] for further management after discharge.         Monet Youngblood MD    D: 10/19/2020 10:32:41       T: 10/19/2020 12:37:11     [email or web address]  Job#: [id number]     Doc#: [id number]    CC:  Vicente Farah MD

## 2020-10-19 NOTE — CARE COORDINATION
LSW reviewed chart/screened Pt for discharge needs. Pt is from home. Pt has PCP. Pt is independent of ADL prior to admission. Pt has med/Rx insurance and is able to afford Rx. Pt has no DME or HC in the home. Pt discharge plan is to return home with no needs. CM to continue to follow.

## 2020-10-20 VITALS
WEIGHT: 293 LBS | SYSTOLIC BLOOD PRESSURE: 125 MMHG | HEIGHT: 64 IN | RESPIRATION RATE: 16 BRPM | TEMPERATURE: 98.5 F | DIASTOLIC BLOOD PRESSURE: 58 MMHG | BODY MASS INDEX: 50.02 KG/M2 | HEART RATE: 71 BPM | OXYGEN SATURATION: 95 %

## 2020-10-20 LAB
ALBUMIN SERPL-MCNC: 3.8 GM/DL (ref 3.4–5)
ALP BLD-CCNC: 86 IU/L (ref 40–128)
ALT SERPL-CCNC: 16 U/L (ref 10–40)
AMYLASE: 21 U/L (ref 25–115)
ANION GAP SERPL CALCULATED.3IONS-SCNC: 6 MMOL/L (ref 4–16)
AST SERPL-CCNC: 21 IU/L (ref 15–37)
BILIRUB SERPL-MCNC: 0.2 MG/DL (ref 0–1)
BUN BLDV-MCNC: 15 MG/DL (ref 6–23)
CALCIUM SERPL-MCNC: 10.1 MG/DL (ref 8.3–10.6)
CHLORIDE BLD-SCNC: 109 MMOL/L (ref 99–110)
CO2: 24 MMOL/L (ref 21–32)
CREAT SERPL-MCNC: 0.8 MG/DL (ref 0.6–1.1)
GFR AFRICAN AMERICAN: >60 ML/MIN/1.73M2
GFR NON-AFRICAN AMERICAN: >60 ML/MIN/1.73M2
GLUCOSE BLD-MCNC: 95 MG/DL (ref 70–99)
HCT VFR BLD CALC: 33.6 % (ref 37–47)
HEMOGLOBIN: 9.7 GM/DL (ref 12.5–16)
LIPASE: 12 IU/L (ref 13–60)
MCH RBC QN AUTO: 25.4 PG (ref 27–31)
MCHC RBC AUTO-ENTMCNC: 28.9 % (ref 32–36)
MCV RBC AUTO: 88 FL (ref 78–100)
PDW BLD-RTO: 13.9 % (ref 11.7–14.9)
PLATELET # BLD: 166 K/CU MM (ref 140–440)
PMV BLD AUTO: 9.9 FL (ref 7.5–11.1)
POTASSIUM SERPL-SCNC: 5.2 MMOL/L (ref 3.5–5.1)
RBC # BLD: 3.82 M/CU MM (ref 4.2–5.4)
SODIUM BLD-SCNC: 139 MMOL/L (ref 135–145)
TOTAL PROTEIN: 5.9 GM/DL (ref 6.4–8.2)
WBC # BLD: 3.5 K/CU MM (ref 4–10.5)

## 2020-10-20 PROCEDURE — 6370000000 HC RX 637 (ALT 250 FOR IP): Performed by: FAMILY MEDICINE

## 2020-10-20 PROCEDURE — 96372 THER/PROPH/DIAG INJ SC/IM: CPT

## 2020-10-20 PROCEDURE — 82150 ASSAY OF AMYLASE: CPT

## 2020-10-20 PROCEDURE — 85027 COMPLETE CBC AUTOMATED: CPT

## 2020-10-20 PROCEDURE — 6360000002 HC RX W HCPCS: Performed by: FAMILY MEDICINE

## 2020-10-20 PROCEDURE — 83690 ASSAY OF LIPASE: CPT

## 2020-10-20 PROCEDURE — 94761 N-INVAS EAR/PLS OXIMETRY MLT: CPT

## 2020-10-20 PROCEDURE — 36415 COLL VENOUS BLD VENIPUNCTURE: CPT

## 2020-10-20 PROCEDURE — 80053 COMPREHEN METABOLIC PANEL: CPT

## 2020-10-20 PROCEDURE — 96376 TX/PRO/DX INJ SAME DRUG ADON: CPT

## 2020-10-20 PROCEDURE — 2580000003 HC RX 258: Performed by: FAMILY MEDICINE

## 2020-10-20 PROCEDURE — G0378 HOSPITAL OBSERVATION PER HR: HCPCS

## 2020-10-20 RX ORDER — HEPARIN SODIUM (PORCINE) LOCK FLUSH IV SOLN 100 UNIT/ML 100 UNIT/ML
100 SOLUTION INTRAVENOUS PRN
Status: DISCONTINUED | OUTPATIENT
Start: 2020-10-20 | End: 2020-10-20 | Stop reason: HOSPADM

## 2020-10-20 RX ADMIN — HYDROMORPHONE HYDROCHLORIDE 0.5 MG: 1 INJECTION, SOLUTION INTRAMUSCULAR; INTRAVENOUS; SUBCUTANEOUS at 03:17

## 2020-10-20 RX ADMIN — PANTOPRAZOLE SODIUM 40 MG: 40 TABLET, DELAYED RELEASE ORAL at 05:50

## 2020-10-20 RX ADMIN — FAMOTIDINE 20 MG: 20 TABLET, FILM COATED ORAL at 11:56

## 2020-10-20 RX ADMIN — Medication 5000 UNITS: at 11:55

## 2020-10-20 RX ADMIN — PROMETHAZINE HYDROCHLORIDE 12.5 MG: 25 INJECTION INTRAMUSCULAR; INTRAVENOUS at 12:20

## 2020-10-20 RX ADMIN — ESTRADIOL 0.5 MG: 1 TABLET ORAL at 11:58

## 2020-10-20 RX ADMIN — HYDROMORPHONE HYDROCHLORIDE 0.5 MG: 1 INJECTION, SOLUTION INTRAMUSCULAR; INTRAVENOUS; SUBCUTANEOUS at 07:47

## 2020-10-20 RX ADMIN — PROMETHAZINE HYDROCHLORIDE 12.5 MG: 25 INJECTION INTRAMUSCULAR; INTRAVENOUS at 05:50

## 2020-10-20 RX ADMIN — SODIUM CHLORIDE: 9 INJECTION, SOLUTION INTRAVENOUS at 11:55

## 2020-10-20 RX ADMIN — LEVETIRACETAM 1000 MG: 500 TABLET, FILM COATED ORAL at 11:56

## 2020-10-20 RX ADMIN — SUCRALFATE 1 G: 1 TABLET ORAL at 11:56

## 2020-10-20 RX ADMIN — ENOXAPARIN SODIUM 40 MG: 40 INJECTION SUBCUTANEOUS at 11:59

## 2020-10-20 RX ADMIN — LEVOTHYROXINE SODIUM 200 MCG: 100 TABLET ORAL at 05:50

## 2020-10-20 RX ADMIN — CALCIUM CARBONATE-CHOLECALCIFEROL TAB 250 MG-125 UNIT 250 MG: 250-125 TAB at 11:57

## 2020-10-20 RX ADMIN — Medication 100 UNITS: at 15:33

## 2020-10-20 RX ADMIN — GABAPENTIN 800 MG: 400 CAPSULE ORAL at 11:57

## 2020-10-20 RX ADMIN — GABAPENTIN 800 MG: 400 CAPSULE ORAL at 15:06

## 2020-10-20 RX ADMIN — GUAIFENESIN 1200 MG: 600 TABLET, EXTENDED RELEASE ORAL at 11:57

## 2020-10-20 RX ADMIN — HYDROMORPHONE HYDROCHLORIDE 0.5 MG: 1 INJECTION, SOLUTION INTRAMUSCULAR; INTRAVENOUS; SUBCUTANEOUS at 11:59

## 2020-10-20 ASSESSMENT — PAIN - FUNCTIONAL ASSESSMENT
PAIN_FUNCTIONAL_ASSESSMENT: ACTIVITIES ARE NOT PREVENTED

## 2020-10-20 ASSESSMENT — PAIN DESCRIPTION - PAIN TYPE
TYPE: ACUTE PAIN
TYPE: ACUTE PAIN
TYPE: CHRONIC PAIN

## 2020-10-20 ASSESSMENT — PAIN DESCRIPTION - DESCRIPTORS
DESCRIPTORS: ACHING;CONSTANT;SHARP
DESCRIPTORS: ACHING;CONSTANT;SHARP
DESCRIPTORS: ACHING

## 2020-10-20 ASSESSMENT — PAIN DESCRIPTION - FREQUENCY
FREQUENCY: CONTINUOUS
FREQUENCY: CONTINUOUS
FREQUENCY: INTERMITTENT

## 2020-10-20 ASSESSMENT — PAIN DESCRIPTION - ONSET
ONSET: ON-GOING

## 2020-10-20 ASSESSMENT — PAIN SCALES - GENERAL
PAINLEVEL_OUTOF10: 0
PAINLEVEL_OUTOF10: 7
PAINLEVEL_OUTOF10: 8
PAINLEVEL_OUTOF10: 0
PAINLEVEL_OUTOF10: 7

## 2020-10-20 ASSESSMENT — PAIN DESCRIPTION - ORIENTATION
ORIENTATION: MID
ORIENTATION: RIGHT;UPPER
ORIENTATION: RIGHT;UPPER

## 2020-10-20 ASSESSMENT — PAIN DESCRIPTION - LOCATION
LOCATION: ABDOMEN

## 2020-10-20 ASSESSMENT — PAIN DESCRIPTION - DIRECTION: RADIATING_TOWARDS: BACK

## 2020-10-20 NOTE — PROGRESS NOTES
Attending Progress Note      PCP: Hortencia Carlson MD      Patient: Clifford Redd   Gender: female  : 1970   Age: 44 y.o. MRN: [id number]  1120/1120-A      Date of Admission: 10/17/2020    Chief Complaint:   Chief Complaint   Patient presents with    Abdominal Pain     right sided abd pain; states it feels like pancreatitis           Subjective: abd pain. . n/v .        Medications:  Reviewed  Infusion Medications    sodium chloride 75 mL/hr at 10/19/20 2210     Scheduled Medications    sodium chloride flush  10 mL Intravenous BID    PARoxetine  50 mg Oral Nightly    gabapentin  800 mg Oral TID    CENTRUM/CERTA-LILLY with minerals oral  15 mL Oral Daily    oyster shell calcium/vitamin D  1 tablet Oral BID    estradiol  0.5 mg Oral Daily    atenolol  25 mg Oral Daily    vitamin D  5,000 Units Oral Daily    pantoprazole  40 mg Oral QAM AC    levETIRAcetam  1,000 mg Oral BID    sucralfate  1 g Oral 2 times per day    sodium chloride flush  10 mL Intravenous 2 times per day    famotidine  20 mg Oral BID    nicotine  1 patch Transdermal Daily    enoxaparin  40 mg Subcutaneous Daily    guaiFENesin  1,200 mg Oral BID    levothyroxine  200 mcg Oral Daily     PRN Meds: HYDROmorphone, dicyclomine, ondansetron, meclizine, sodium chloride flush, potassium chloride **OR** potassium alternative oral replacement **OR** potassium chloride, magnesium sulfate, acetaminophen **OR** acetaminophen, polyethylene glycol, fleet, promethazine **OR** ondansetron, zolpidem, calcium carbonate, tiZANidine, benzonatate, oxyCODONE-acetaminophen, promethazine      Intake/Output Summary (Last 24 hours) at 10/19/2020 2351  Last data filed at 10/19/2020 1017  Gross per 24 hour   Intake 1124 ml   Output 1600 ml   Net -476 ml       Exam:  BP (!) 150/70   Pulse 61   Temp 98.4 °F (36.9 °C)   Resp 18   Ht 5' 4\" (1.626 m)   Wt 294 lb (133.4 kg)   SpO2 97%   BMI 50.46 kg/m²   General appearance: No distress, Respiratory:  good air entry , no Rales , No wheezing, or rhonchi,  Cardiovascular: RRR, with normal S1/S2 . Abdomen : Soft, non-tender, non-distended  , normal bowel sounds. Legs : No edema bilaterally. No DVT signs ,    Neurologic:  Alert and oriented ,        Labs:   Recent Labs     10/17/20  0425 10/18/20  0530 10/19/20  0556   WBC 4.9 3.6* 2.8*   HGB 10.9* 10.3* 9.8*   HCT 35.9* 33.9* 32.5*    170 145     Recent Labs     10/17/20  0425 10/18/20  0530    139   K 4.6 4.3    108   CO2 24 24   BUN 17 13   CREATININE 0.7 0.8   CALCIUM 10.1 9.4     Recent Labs     10/17/20  0425   AST 18   ALT 17   BILITOT 0.2   ALKPHOS 91     No results for input(s): INR in the last 72 hours. No results for input(s): Rene Altes in the last 72 hours. Assessment/Plan:    Active Hospital Problems    Diagnosis Date Noted    Intractable vomiting [R11.10] 10/17/2020   acute on chronic pain    s/p gastric sleeve on 2/12/2019  Anxiety  h/o Upper GI bleed few years ago   mild gastritis ,   Gastroparesis with allergy to Reglan   History of pancreatitis  Seizure vs pseudoseizure   Chronic Anemia   Morbid obesity         Assessment/Plan:   Tele   IVF  Percocet PO, Morphine IV  Zofran, Phenergan    GI input noted . . f/u in [facility] , reviewed and resumed as appropriate   Symptoms releif/Pain control  DVT proph           DVT Prophylaxis   Diet: DIET LOW FAT;  Code Status: Full Code    Treatment progress and plan was d/w pt/family .         Ashanti Menon MD

## 2020-10-20 NOTE — DISCHARGE SUMMARY
Patient: Annette Hurst MD      Gender: female  : 1970   Age: 44 y.o. MRN: [id number]    Admitting Physician: Dawna Roberts MD  Discharge Physician: Dawna Roberts MD     Code Status: Full Code     Admit Date: 10/17/2020   Discharge Date: 10/20/20      Disposition:  Home       Condition at Discharge:  stable . Follow-up appointments:  f/u one week with PCP , and with consultants as recommended . Outpatient to do list: f/u       Discharge Diagnoses: Active Hospital Problems    Diagnosis    Intractable vomiting [R11.10]      acute on chronic pain    s/p gastric sleeve on 2019  Anxiety  h/o Upper GI bleed few years ago   mild gastritis ,   Gastroparesis with allergy to Reglan   History of pancreatitis  Seizure vs pseudoseizure   Chronic Anemia   Morbid obesity      History of Present Illness:  Andre Pope is a 44 y.o. female with morbid obesity , Anxiety , h/o Upper GI bleed few years ago , s/p gastric sleeve on 2019 with mild gastritis , multiple  EGD with MRI and  MRCP on 2020 with non acute result ,gastroparesis with allergy to Reglan  History of pancreatitis , Pt has chronic RUQ abdominal pain associated with nausea, vomiting and she has been admitted multiple  times with same symptoms and a discussion took place for possible biliary stent to be placed as pt had similar pain in th past and it got better significantly after a stent placed , pt reached out to my office couple days ago and she stated that she called DR Mack Arteaga and Dr Cal Gonsalez offices  . Pt was seen previously in [facility] also   SBFT done with no active finding .   History obtained from patient .         Hospital Course:   Tele   IVF  Percocet PO, Morphine IV  Zofran, Phenergan   Consider GI consult for Dr Darrin Graham meds , reviewed and resumed as appropriate   Symptoms releif/Pain control  DVT proph      Pt discharged after she reached maximal benefit , and fter flare up of Abd pain/N /V resolved , and able to tolerate PO intake . Consults. IP CONSULT TO PRIMARY CARE PROVIDER  IP CONSULT TO GI        Discharge Medications:   Current Discharge Medication List        Current Discharge Medication List        Current Discharge Medication List      CONTINUE these medications which have NOT CHANGED    Details   meclizine (ANTIVERT) 25 MG tablet Take 1 tablet by mouth 3 times daily as needed for Dizziness  Qty: 30 tablet, Refills: 2      sucralfate (CARAFATE) 1 GM tablet Take 1 tablet by mouth every 12 hours  Qty: 120 tablet, Refills: 3      albuterol sulfate  (90 Base) MCG/ACT inhaler       ondansetron (ZOFRAN ODT) 4 MG disintegrating tablet Take 1 tablet by mouth every 8 hours as needed for Nausea or Vomiting  Qty: 30 tablet, Refills: 5      levETIRAcetam (KEPPRA) 1000 MG tablet Take 1 tablet by mouth 2 times daily  Qty: 60 tablet, Refills: 5      pantoprazole (PROTONIX) 40 MG tablet Take 1 tablet by mouth every morning (before breakfast)  Qty: 90 tablet, Refills: 1      dicyclomine (BENTYL) 10 MG capsule Take 1 capsule by mouth 3 times daily as needed (abdominal pain)  Qty: 21 capsule, Refills: 3      oxyCODONE-acetaminophen (PERCOCET) 5-325 MG per tablet Take 1 tablet by mouth.  Every 4-6 hours PRN      Cholecalciferol (VITAMIN D3) 5000 units TABS Take 1 tablet by mouth daily      estradiol (ESTRACE) 0.5 MG tablet Take 0.5 mg by mouth daily      atenolol (TENORMIN) 25 MG tablet Take 25 mg by mouth daily      Multiple Vitamin (MVI, BARIATRIC ADVANTAGE MULTI-FORMULA, CHEW TAB) Take 1 tablet by mouth daily  Qty: 30 tablet, Refills: 5      Calcium Citrate-Vitamin D (CALCIUM + VIT D, BARIATRIC ADVANTAGE, CHEWABLE TABLET) Take 1 tablet by mouth 2 times daily  Qty: 120 tablet, Refills: 5      levothyroxine (SYNTHROID) 125 MCG tablet Take 1 tablet by mouth Daily  Qty: 30 tablet, Refills: 0      gabapentin (NEURONTIN) 800 MG tablet Take 800 mg by mouth 3 times daily      PARoxetine (PAXIL) 30 MG tablet Take 50 mg by mouth nightly            Current Discharge Medication List          Discharge ROS:  A complete review of systems was asked and negative except for abd discomfort, N. Discharge Exam:    BP (!) 125/58 Comment: norm for pt  Pulse 71   Temp 98.5 °F (36.9 °C) (Oral)   Resp 16   Ht 5' 4\" (1.626 m)   Wt 294 lb (133.4 kg)   SpO2 95%   BMI 50.46 kg/m²   General appearance:  NAD  Heart[de-identified] Normal s1/s2, RRR, no murmurs, gallops, or rubs. No leg edema  Lungs:  Clear to auscultation, bilaterally without Rales/Wheezes/Rhonchi. Abdomen: Soft, non-tender, non-distended, bowel sounds present  Musculoskeletal:   no cyanosis, no edema  Neurologic:  Cranial nerves: II-XII intact, grossly non-focal.  Psychiatric:  A & O x3      Labs: For convenience and continuity at follow-up the following most recent labs are provided:    Lab Results   Component Value Date    WBC 3.5 10/20/2020    HGB 9.7 10/20/2020    HCT 33.6 10/20/2020    MCV 88.0 10/20/2020     10/20/2020     10/20/2020    K 5.2 10/20/2020     10/20/2020    CO2 24 10/20/2020    BUN 15 10/20/2020    CREATININE 0.8 10/20/2020    CALCIUM 10.1 10/20/2020    PHOS 4.4 12/04/2015    BNP 88 11/26/2010    ALKPHOS 86 10/20/2020    ALT 16 10/20/2020    AST 21 10/20/2020    BILITOT 0.2 10/20/2020    BILIDIR 0.2 01/02/2016    LABALBU 3.8 10/20/2020    LABALBU 8 11/18/2015    TRIG 161 07/23/2015     Lab Results   Component Value Date    INR 0.93 08/07/2020    INR 0.93 01/10/2020    INR ? 01/09/2020           Chart review shows recent radiographs:  Ct Abdomen Pelvis W Iv Contrast Additional Contrast? None    Result Date: 10/17/2020  EXAMINATION: CT OF THE ABDOMEN AND PELVIS WITH CONTRAST 10/17/2020 6:10 am TECHNIQUE: CT of the abdomen and pelvis was performed with the administration of intravenous contrast. Multiplanar reformatted images are provided for review.  Dose modulation, iterative reconstruction, and/or weight based adjustment of the mA/kV was utilized to reduce the radiation dose to as low as reasonably achievable. COMPARISON: CT abdomen and pelvis dated August 17, 2020. HISTORY: ORDERING SYSTEM PROVIDED HISTORY: abd pain, n/v TECHNOLOGIST PROVIDED HISTORY: Reason for exam:->abd pain, n/v Additional Contrast?->None Reason for Exam: abd pain Acuity: Acute Type of Exam: Initial Additional signs and symptoms: Pt to ED from home. Pt states she is having right sided pain that felt like pancreatitis that started about a week ago. She took percocet at home without relief. Relevant Medical/Surgical History: isovue 370 80 ml FINDINGS: Lower Chest: Bibasilar atelectasis is noted. Organs: Pneumobilia is noted. This is not significantly changed. There has been a cholecystectomy. The spleen, adrenal glands, pancreas, and kidneys are unremarkable. GI/Bowel: Postsurgical changes are seen to the stomach. There is no bowel obstruction. The appendix is not visualized. Pelvis: Bladder is unremarkable. There is no free fluid. There has been a hysterectomy. Peritoneum/Retroperitoneum: There is no free air or lymphadenopathy. Bones/Soft Tissues: No destructive osseous lesions are identified. 1. No acute findings. Mri Abdomen Wo Contrast Mrcp    Result Date: 9/25/2020  EXAMINATION: MRI OF THE ABDOMEN WITHOUT CONTRAST AND MRCP 9/25/2020 12:41 pm TECHNIQUE: Multiplanar multisequence MRI of the abdomen was performed without the administration of intravenous contrast.  After initial T2 axial and coronal images, thick slab, thin slab and 3D coronal MRCP sequences were obtained without the administration of intravenous contrast.  MIP images are provided for review.  COMPARISON: 08/17/2020 CT HISTORY: ORDERING SYSTEM PROVIDED HISTORY: check bile duct, chronic air TECHNOLOGIST PROVIDED HISTORY: Reason for exam:->check bile duct, chronic air Is the patient pregnant?->No Reason for Exam: check bile duct, chronic air; pt states abd pain Acuity: Acute Type of Exam: Initial Relevant Medical/Surgical History: none FINDINGS: Gallbladder: The gallbladder is surgically absent Bile Ducts: Artifact from air in the intrahepatic biliary ducts corresponding to known pneumobilia observed on several prior imaging studies. Due to the presence of air, evaluation of the common bile duct is very limited, not fully visualized with MRCP technique. The distal common bile duct measures 10 mm which does appear relatively stable from recent imaging. Pancreatic Duct: The pancreatic duct is normal. Other: There are no other significant findings in the abdomen. 1. Technically limited MRCP due to pneumobilia. This obscures signal within the common bile duct. 2. Stable chronic biliary dilatation of the CBD which is likely physiologic given a prior history of cholecystectomy. Fl Ugi    Result Date: 9/24/2020  EXAMINATION: SINGLE CONTRAST UPPER GI SERIES 9/24/2020 HISTORY: ORDERING SYSTEM PROVIDED HISTORY: abd pain TECHNOLOGIST PROVIDED HISTORY: No small bowel follow through Reason for exam:->abd pain Reason for Exam: abd pain Similar exam 07/06/2020 COMPARISON: None. TECHNIQUE: Multiple single contrast images of the esophagus, gastroesophageal junction and stomach were obtained following the oral administration of water soluble contrast FLUOROSCOPY DOSE AND TYPE OR TIME AND EXPOSURES: Air Kerma 24.8 mGy FINDINGS: There is some esophageal dysmotility. Contrast passes into the stomach without difficulty. Postsurgical changes from sleeve gastrectomy appear unchanged. No area of stenosis. No leak. Contrast passes into the duodenum without difficulty. Postsurgical changes from sleeve gastrectomy without evidence of acute complication. No stricture or leak.        EKG     Rhythm: normal sinus   Rate: normal  Clinical Impression: no acute changes        The patient was seen and examined on day of discharge and this discharge summary is in conjunction with any daily progress note from day of discharge. Time Spent on discharge is   >35  min  in the examination, evaluation, counseling and review of medications and discharge plan.             Signed:    Joyce Chavez MD   10/20/2020

## 2020-10-20 NOTE — PLAN OF CARE
Problem: Falls - Risk of:  Goal: Will remain free from falls  Description: Will remain free from falls  10/20/2020 1406 by Jesus Harris RN  Outcome: Ongoing  10/20/2020 0538 by Faiza Grant RN  Outcome: Ongoing  Goal: Absence of physical injury  Description: Absence of physical injury  10/20/2020 1406 by Jesus Harris RN  Outcome: Ongoing  10/20/2020 0538 by Faiza Grant RN  Outcome: Ongoing     Problem: Pain:  Description: Pain management should include both nonpharmacologic and pharmacologic interventions.   Goal: Pain level will decrease  Description: Pain level will decrease  10/20/2020 1406 by Jesus Harris RN  Outcome: Ongoing  10/20/2020 0538 by Faiza Grant RN  Outcome: Ongoing  Goal: Control of acute pain  Description: Control of acute pain  10/20/2020 1406 by Jesus Harris RN  Outcome: Ongoing  10/20/2020 0538 by Faiza Grant RN  Outcome: Ongoing  Goal: Control of chronic pain  Description: Control of chronic pain  10/20/2020 1406 by Jesus Harris RN  Outcome: Ongoing  10/20/2020 0538 by Faiza Grant RN  Outcome: Ongoing  Goal: Patient's pain/discomfort is manageable  Description: Patient's pain/discomfort is manageable  Outcome: Ongoing     Problem: Infection:  Goal: Will remain free from infection  Description: Will remain free from infection  Outcome: Ongoing     Problem: Safety:  Goal: Free from accidental physical injury  Description: Free from accidental physical injury  Outcome: Ongoing  Goal: Free from intentional harm  Description: Free from intentional harm  Outcome: Ongoing     Problem: Daily Care:  Goal: Daily care needs are met  Description: Daily care needs are met  Outcome: Ongoing     Problem: Skin Integrity:  Goal: Skin integrity will stabilize  Description: Skin integrity will stabilize  Outcome: Ongoing     Problem: Discharge Planning:  Goal: Patients continuum of care needs are met  Description: Patients continuum of care needs are met  Outcome: Ongoing

## 2020-10-21 ENCOUNTER — TELEPHONE (OUTPATIENT)
Dept: BARIATRICS/WEIGHT MGMT | Age: 50
End: 2020-10-21

## 2020-10-21 RX ORDER — PROMETHAZINE HYDROCHLORIDE 25 MG/1
25 TABLET ORAL EVERY 6 HOURS PRN
Qty: 30 TABLET | Refills: 2 | Status: SHIPPED | OUTPATIENT
Start: 2020-10-21 | End: 2020-10-28

## 2020-10-23 ENCOUNTER — OFFICE VISIT (OUTPATIENT)
Dept: BARIATRICS/WEIGHT MGMT | Age: 50
End: 2020-10-23
Payer: COMMERCIAL

## 2020-10-23 VITALS
OXYGEN SATURATION: 95 % | WEIGHT: 280.3 LBS | HEIGHT: 64 IN | DIASTOLIC BLOOD PRESSURE: 86 MMHG | BODY MASS INDEX: 47.85 KG/M2 | HEART RATE: 82 BPM | SYSTOLIC BLOOD PRESSURE: 122 MMHG | TEMPERATURE: 97.4 F | RESPIRATION RATE: 16 BRPM

## 2020-10-23 PROBLEM — E66.01 MORBID OBESITY WITH BMI OF 45.0-49.9, ADULT (HCC): Status: ACTIVE | Noted: 2020-10-23

## 2020-10-23 PROBLEM — Z98.84 STATUS POST BARIATRIC SURGERY: Status: ACTIVE | Noted: 2020-10-23

## 2020-10-23 PROCEDURE — 1036F TOBACCO NON-USER: CPT | Performed by: SURGERY

## 2020-10-23 PROCEDURE — G8484 FLU IMMUNIZE NO ADMIN: HCPCS | Performed by: SURGERY

## 2020-10-23 PROCEDURE — G8417 CALC BMI ABV UP PARAM F/U: HCPCS | Performed by: SURGERY

## 2020-10-23 PROCEDURE — G8427 DOCREV CUR MEDS BY ELIG CLIN: HCPCS | Performed by: SURGERY

## 2020-10-23 PROCEDURE — 99214 OFFICE O/P EST MOD 30 MIN: CPT | Performed by: SURGERY

## 2020-10-23 PROCEDURE — 3017F COLORECTAL CA SCREEN DOC REV: CPT | Performed by: SURGERY

## 2020-10-23 RX ORDER — AMOXICILLIN 250 MG
2 CAPSULE ORAL DAILY
Qty: 60 TABLET | Refills: 5 | Status: SHIPPED | OUTPATIENT
Start: 2020-10-23 | End: 2020-11-02

## 2020-10-23 RX ORDER — FERROUS SULFATE 325(65) MG
325 TABLET ORAL
Qty: 90 TABLET | Refills: 5 | Status: SHIPPED | OUTPATIENT
Start: 2020-10-23 | End: 2022-06-24

## 2020-10-23 ASSESSMENT — ENCOUNTER SYMPTOMS
ANAL BLEEDING: 0
SHORTNESS OF BREATH: 0
COLOR CHANGE: 0
ABDOMINAL PAIN: 0
BLOOD IN STOOL: 0
PHOTOPHOBIA: 0
COUGH: 0
CONSTIPATION: 0
VOMITING: 0
VOICE CHANGE: 0
TROUBLE SWALLOWING: 0
SORE THROAT: 0
DIARRHEA: 0
NAUSEA: 0
WHEEZING: 0

## 2020-10-23 NOTE — PROGRESS NOTES
BARIATRIC SURGERY POST OPERATIVE NOTE    SUBJECTIVE:    Patient presenting today referred from MIGUE BUSCH MD, for   Chief Complaint   Patient presents with   [facility] Surgical Consult     F/U would like to discuss conversion to RYGB S/P sleeve 2019     /86   Pulse 82   Temp 97.4 °F (36.3 °C) (Infrared)   Resp 16   Ht 5' 4\" (1.626 m)   Wt 280 lb 4.8 oz (127.1 kg)   SpO2 95%   BMI 48.11 kg/m²      HPI: Rose Mary Fields is a 44 y.o. female    S/p sleeve gastrectomy:     Addressed the status of the following co-morbidities:   1. RUQ pain  improving. 2. Weight loss  worsening. 3. CBD disorder. . improving. Current Outpatient Medications:     ferrous sulfate (IRON 325) 325 (65 Fe) MG tablet, Take 1 tablet by mouth daily (with breakfast), Disp: 90 tablet, Rfl: 5    senna-docusate (SENOKOT S) 8.6-50 MG per tablet, Take 2 tablets by mouth daily for 10 days, Disp: 60 tablet, Rfl: 5    promethazine (PHENERGAN) 25 MG tablet, Take 1 tablet by mouth every 6 hours as needed for Nausea, Disp: 30 tablet, Rfl: 2    meclizine (ANTIVERT) 25 MG tablet, Take 1 tablet by mouth 3 times daily as needed for Dizziness, Disp: 30 tablet, Rfl: 2    sucralfate (CARAFATE) 1 GM tablet, Take 1 tablet by mouth every 12 hours, Disp: 120 tablet, Rfl: 3    albuterol sulfate  (90 Base) MCG/ACT inhaler, , Disp: , Rfl:     ondansetron (ZOFRAN ODT) 4 MG disintegrating tablet, Take 1 tablet by mouth every 8 hours as needed for Nausea or Vomiting, Disp: 30 tablet, Rfl: 5    levETIRAcetam (KEPPRA) 1000 MG tablet, Take 1 tablet by mouth 2 times daily, Disp: 60 tablet, Rfl: 5    pantoprazole (PROTONIX) 40 MG tablet, Take 1 tablet by mouth every morning (before breakfast), Disp: 90 tablet, Rfl: 1    dicyclomine (BENTYL) 10 MG capsule, Take 1 capsule by mouth 3 times daily as needed (abdominal pain), Disp: 21 capsule, Rfl: 3    oxyCODONE-acetaminophen (PERCOCET) 5-325 MG per tablet, Take 1 tablet by mouth.  Every 4-6 hours PRN, Disp: , Rfl:     Cholecalciferol (VITAMIN D3) 5000 units TABS, Take 1 tablet by mouth daily, Disp: , Rfl:     estradiol (ESTRACE) 0.5 MG tablet, Take 0.5 mg by mouth daily, Disp: , Rfl:     atenolol (TENORMIN) 25 MG tablet, Take 25 mg by mouth daily, Disp: , Rfl:     Multiple Vitamin (MVI, BARIATRIC ADVANTAGE MULTI-FORMULA, CHEW TAB), Take 1 tablet by mouth daily, Disp: 30 tablet, Rfl: 5    Calcium Citrate-Vitamin D (CALCIUM + VIT D, BARIATRIC ADVANTAGE, CHEWABLE TABLET), Take 1 tablet by mouth 2 times daily, Disp: 120 tablet, Rfl: 5    levothyroxine (SYNTHROID) 125 MCG tablet, Take 1 tablet by mouth Daily (Patient taking differently: Take 125 mcg by mouth nightly ), Disp: 30 tablet, Rfl: 0    gabapentin (NEURONTIN) 800 MG tablet, Take 800 mg by mouth 3 times daily, Disp: , Rfl:     PARoxetine (PAXIL) 30 MG tablet, Take 50 mg by mouth nightly , Disp: , Rfl:   Past Medical History:   Diagnosis Date    Acid reflux     Anemia     Anxiety     Arthritis     Hands, Back And Ankles    Bleeding ulcer 2014    \"I Had Ulcers In My Stomach And Colon\"/ per pt on 8/12/2019\"they said recently having some blood in my stomach- in July ( 2019)could not find where coming from \"    Bronchitis Last Episode 2014    Chronic back pain     [facility]     [facility]    COPD (chronic obstructive pulmonary disease) ([facility] [address]     Sees Dr. Brionna Goldman    Depression     Fibromyalgia Dx 2013    H/O echocardiogram 8/11/15    EF >55%. LA to be at the upper limit of normal in size. LV hypertrophy with normal LV systolic, but abnormal diastolic function. Normal valvular structures and function.      H/O echocardiogram 08/30/2018    EF 55-60%    Hiatal hernia     History of blood transfusion 09/2015 And 2018    No Reaction To Blood Transfusions Received    [place] (hard of hearing)     Right Ear    Hx of cardiac catheterization 10/24/2010    Angiographically normal coronary arteries w/ normal LV function and wall motion.  Hx of transesophageal echocardiography (PILO) for monitoring 12/2/2010    EF 55-60%. WNL. 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 name    Kidney cysts     \"they found that I have a kidney cyst but not sure which side\" per pt on 7/15/2020    Lupus ([address] Dx 2013    \"Rheumatoid Lupus\"    MDRO (multiple drug resistant organisms) resistance     4 months ago chest from mediport    Morbid obesity ([address]     MRSA bacteremia     Nausea     \"Most Of The Time\"    Pain management     Sees Dr. Cooper Lopes, Once A Month    Pancreatitis chronic Dx 2001    Pneumonia Dx 11-15    Shortness of breath on exertion     Shortness of breath on exertion     Sleep apnea     Has CPAP\"no longer use the cpap since lost weight\"    Staph infection Dx 11-15    Left Foot    Staph infection Dx 11-15    \"Left Foot\"    Teeth missing     Upper And Lower    Thyroid disease     Wears glasses     To Read      Past Surgical History:   Procedure Laterality Date    APPENDECTOMY  02/1998    Done When Tubes And Ovaries Were Removed    CARDIAC CATHETERIZATION  10/24/2010    CATHETER REMOVAL N/A 7/16/2019    PORT REMOVAL performed by Kim Marcelo MD at [address]  08/27/1991    CHOLECYSTECTOMY, LAPAROSCOPIC  1990's    COLONOSCOPY  Last Done In 2000's    DENTAL SURGERY      Teeth Extracted In Past    ENDOSCOPY, COLON, DIAGNOSTIC  Last Done In 2018    FOOT DEBRIDEMENT Left 6/16/2019    FIRST METATARSAL DEBRIDEMENT INCISION AND DRAINAGE. EXCISION OF ULCER.  RESECTION OF BONE. 1ST METATARSAL POWER LAVAGE AND BONE CEMENT performed by Chad Berumen DPM at [address] [facility] In 2016     Dr. Lillie Sanchez, \" About 6 Surgeries Done Because Of Staph Infection\"    HIATAL HERNIA REPAIR N/A 2/12/2019    HERNIA HIATAL LAPAROSCOPIC ROBOTIC performed by Mac Vaz MD at [place] [address]  10/1997    Partial Abdominal Hysterectomy    INSERTION / REMOVAL / REPLACEMENT VENOUS ACCESS CATHETER N/A 8/15/2019    PORT INSERTION performed by Mac Vaz MD at [address] [place]    removed after 6 months    LUNG REMOVAL, PARTIAL Left 2008    Benign    OTHER SURGICAL HISTORY  02/1998    \"Tubes And Ovaries Removed, Appendectomy Also Done\"    OTHER SURGICAL HISTORY  Last Done 7-15-16    Mediport Insertion \"Total Of Six Done, Removed Last Mediport In 2014\"    PORT SURGERY N/A 1/15/2020    PORT REMOVAL performed by Mac Vaz MD at [facility] N/A 7/6/2020    PORT INSERTION performed by Mac Vaz MD at [address] [place] [address] N/A 2/12/2019    GASTRECTOMY SLEEVE LAPAROSCOPIC ROBOTIC performed by Mac Vaz MD at [address] [place] [address]  08/27/2018    UPPER GASTROINTESTINAL ENDOSCOPY N/A 4/2/2019    EGD CONTROL HEMORRHAGE with epi injection at bleeding site performed by Mac Vaz MD at [place] [address] 6/19/2019    EGD DIAGNOSTIC ONLY performed by Mac Vaz MD at [facility] N/A 7/22/2019    EGD DIAGNOSTIC ONLY performed by Murphy Scott MD at [facility] N/A 10/14/2019    EGD DIAGNOSTIC ONLY performed by Mac Vaz MD at [facility] N/A 7/17/2020    EGJ DILATATION BALLOON WITH 18-20 MM BALLOON, DILATED TO 20 MM. performed by Mac Vaz MD at [facility] Marital status:      Spouse name: None    Number of children: None    Years of education: None    Highest education level: None   Occupational History    None   Social Needs    Financial resource strain: None    Food insecurity     Worry: None     Inability: None    Transportation needs     Medical: None     Non-medical: None   Tobacco Use    Smoking status: Never Smoker    Smokeless tobacco: Never Used   Substance and Sexual Activity    Alcohol use: No     Alcohol/week: 0.0 standard drinks    Drug use: No    Sexual activity: Not Currently   Lifestyle    Physical activity     Days per week: None     Minutes per session: None    Stress: None   Relationships    Social connections     Talks on phone: None     Gets together: None     Attends Congregation service: None     Active member of club or organization: None     Attends meetings of clubs or organizations: None     Relationship status: None    Intimate partner violence     Fear of current or ex partner: None     Emotionally abused: None     Physically abused: None     Forced sexual activity: None   Other Topics Concern    None   Social History Narrative    None     Review of Systems   Constitutional: Negative for activity change, chills, diaphoresis and fever. HENT: Negative for sore throat, trouble swallowing and voice change. Eyes: Negative for photophobia and visual disturbance. Respiratory: Negative for cough, shortness of breath and wheezing. Cardiovascular: Negative for chest pain, palpitations and leg swelling. Gastrointestinal: Negative for abdominal pain, anal bleeding, blood in stool, constipation, diarrhea, nausea and vomiting. Endocrine: Negative for cold intolerance, heat intolerance, polydipsia and polyuria. Genitourinary: Negative for dysuria, frequency and hematuria. Musculoskeletal: Negative for joint swelling, myalgias and neck stiffness. Skin: Negative for color change and rash. Neurological: Negative for seizures, speech difficulty, light-headedness and numbness.    Hematological: Negative for adenopathy. Does not bruise/bleed easily. OBJECTIVE:    Physical Exam  Vitals signs reviewed. Constitutional:       General: She is not in acute distress. Appearance: She is well-developed. She is not diaphoretic. HENT:      Head: Normocephalic and atraumatic. Eyes:      General: No scleral icterus. Conjunctiva/sclera: Conjunctivae normal.      Pupils: Pupils are equal, round, and reactive to light. Neck:      Musculoskeletal: Normal range of motion. Thyroid: No thyromegaly. Vascular: No JVD. Trachea: No tracheal deviation. Cardiovascular:      Rate and Rhythm: Normal rate and regular rhythm. Heart sounds: Normal heart sounds. No murmur. No friction rub. No gallop. Pulmonary:      Effort: Pulmonary effort is normal. No respiratory distress. Breath sounds: No stridor. No wheezing or rales. Chest:      Chest wall: No tenderness. Abdominal:      General: Bowel sounds are normal. There is no distension. Palpations: Abdomen is soft. There is no mass. Tenderness: There is no abdominal tenderness. There is no guarding or rebound. Musculoskeletal: Normal range of motion. General: No tenderness. Lymphadenopathy:      Cervical: No cervical adenopathy. Skin:     General: Skin is warm and dry. Coloration: Skin is not pale. Findings: No erythema or rash. Neurological:      Mental Status: She is alert and oriented to person, place, and time. Cranial Nerves: No cranial nerve deficit. Coordination: Coordination normal.   Psychiatric:         Behavior: Behavior normal.         Thought Content: Thought content normal.         Judgment: Judgment normal.         Celery, and diet pepsi. . gained 19.1 Lbs, needs the RYGB, will place her back in the program, and proceed     Assessment / Plan:    1. Status post bariatric surgery    2. Status post laparoscopic sleeve gastrectomy    3.  Morbid obesity with BMI of 45.0-49.9, adult [facility])        Needs a PCP, I counseled her in length, and will proceed with RYGB, in few months, see matthew. Follow Up:  Return in about 4 weeks (around 11/20/2020) for Bariatric follow up: diet, exercise & weight loss, Follow up Symptoms.     Leopold Jack, MD, FACS, FICS  Member of the [phone] of [employer] and [employer]    [phone]    10/23/20

## 2020-11-03 ENCOUNTER — HOSPITAL ENCOUNTER (EMERGENCY)
Age: 50
Discharge: HOME | End: 2020-11-03
Attending: EMERGENCY MEDICINE
Payer: COMMERCIAL

## 2020-11-03 ENCOUNTER — APPOINTMENT (OUTPATIENT)
Dept: CT IMAGING | Age: 50
End: 2020-11-03
Payer: COMMERCIAL

## 2020-11-03 ENCOUNTER — APPOINTMENT (OUTPATIENT)
Dept: GENERAL RADIOLOGY | Age: 50
End: 2020-11-03
Payer: COMMERCIAL

## 2020-11-03 VITALS
SYSTOLIC BLOOD PRESSURE: 117 MMHG | WEIGHT: 280 LBS | DIASTOLIC BLOOD PRESSURE: 66 MMHG | BODY MASS INDEX: 47.8 KG/M2 | OXYGEN SATURATION: 100 % | RESPIRATION RATE: 21 BRPM | HEART RATE: 57 BPM | HEIGHT: 64 IN

## 2020-11-03 LAB
ALBUMIN SERPL-MCNC: 3.8 GM/DL (ref 3.4–5)
ALP BLD-CCNC: 90 IU/L (ref 40–129)
ALT SERPL-CCNC: 17 U/L (ref 10–40)
ANION GAP SERPL CALCULATED.3IONS-SCNC: 11 MMOL/L (ref 4–16)
AST SERPL-CCNC: 21 IU/L (ref 15–37)
BACTERIA: NEGATIVE /HPF
BASE EXCESS: 3 (ref 0–2.4)
BASOPHILS ABSOLUTE: 0 K/CU MM
BASOPHILS RELATIVE PERCENT: 0.7 % (ref 0–1)
BILIRUB SERPL-MCNC: 0.2 MG/DL (ref 0–1)
BILIRUBIN URINE: NEGATIVE MG/DL
BLOOD, URINE: NEGATIVE
BUN BLDV-MCNC: 19 MG/DL (ref 6–23)
CALCIUM SERPL-MCNC: 9.7 MG/DL (ref 8.3–10.6)
CHLORIDE BLD-SCNC: 101 MMOL/L (ref 99–110)
CLARITY: CLEAR
CO2: 20 MMOL/L (ref 21–32)
COLOR: YELLOW
COMMENT: ABNORMAL
CREAT SERPL-MCNC: 0.7 MG/DL (ref 0.6–1.1)
DIFFERENTIAL TYPE: ABNORMAL
EOSINOPHILS ABSOLUTE: 0.2 K/CU MM
EOSINOPHILS RELATIVE PERCENT: 4.1 % (ref 0–3)
GFR AFRICAN AMERICAN: >60 ML/MIN/1.73M2
GFR NON-AFRICAN AMERICAN: >60 ML/MIN/1.73M2
GLUCOSE BLD-MCNC: 87 MG/DL (ref 70–99)
GLUCOSE BLD-MCNC: 87 MG/DL (ref 70–99)
GLUCOSE, URINE: NEGATIVE MG/DL
HCO3 VENOUS: 22.7 MMOL/L (ref 19–25)
HCT VFR BLD CALC: 36 % (ref 37–47)
HEMOGLOBIN: 10.9 GM/DL (ref 12.5–16)
IMMATURE NEUTROPHIL %: 0.2 % (ref 0–0.43)
KETONES, URINE: NEGATIVE MG/DL
LEUKOCYTE ESTERASE, URINE: NEGATIVE
LIPASE: 18 IU/L (ref 13–60)
LYMPHOCYTES ABSOLUTE: 1.6 K/CU MM
LYMPHOCYTES RELATIVE PERCENT: 35.7 % (ref 24–44)
MCH RBC QN AUTO: 25.6 PG (ref 27–31)
MCHC RBC AUTO-ENTMCNC: 30.3 % (ref 32–36)
MCV RBC AUTO: 84.7 FL (ref 78–100)
MONOCYTES ABSOLUTE: 0.4 K/CU MM
MONOCYTES RELATIVE PERCENT: 7.6 % (ref 0–4)
MUCUS: ABNORMAL HPF
NITRITE URINE, QUANTITATIVE: NEGATIVE
NUCLEATED RBC %: 0 %
O2 SAT, VEN: 75.1 % (ref 50–70)
PCO2, VEN: 43 MMHG (ref 38–52)
PDW BLD-RTO: 13.9 % (ref 11.7–14.9)
PH VENOUS: 7.33 (ref 7.32–7.42)
PH, URINE: 5 (ref 5–8)
PLATELET # BLD: 231 K/CU MM (ref 140–440)
PMV BLD AUTO: 9.7 FL (ref 7.5–11.1)
PO2, VEN: 41 MMHG (ref 28–48)
POTASSIUM SERPL-SCNC: 4.1 MMOL/L (ref 3.5–5.1)
PROTEIN UA: NEGATIVE MG/DL
RBC # BLD: 4.25 M/CU MM (ref 4.2–5.4)
RBC URINE: <1 /HPF (ref 0–6)
SEGMENTED NEUTROPHILS ABSOLUTE COUNT: 2.4 K/CU MM
SEGMENTED NEUTROPHILS RELATIVE PERCENT: 51.7 % (ref 36–66)
SODIUM BLD-SCNC: 132 MMOL/L (ref 135–145)
SPECIFIC GRAVITY UA: 1.02 (ref 1–1.03)
SQUAMOUS EPITHELIAL: 4 /HPF
TOTAL IMMATURE NEUTOROPHIL: 0.01 K/CU MM
TOTAL NUCLEATED RBC: 0 K/CU MM
TOTAL PROTEIN: 6.6 GM/DL (ref 6.4–8.2)
TRICHOMONAS: ABNORMAL /HPF
TROPONIN T: <0.01 NG/ML
UROBILINOGEN, URINE: NORMAL MG/DL (ref 0.2–1)
WBC # BLD: 4.6 K/CU MM (ref 4–10.5)
WBC UA: 9 /HPF (ref 0–5)

## 2020-11-03 PROCEDURE — 99284 EMERGENCY DEPT VISIT MOD MDM: CPT

## 2020-11-03 PROCEDURE — 71045 X-RAY EXAM CHEST 1 VIEW: CPT

## 2020-11-03 PROCEDURE — 80053 COMPREHEN METABOLIC PANEL: CPT

## 2020-11-03 PROCEDURE — 6360000002 HC RX W HCPCS: Performed by: EMERGENCY MEDICINE

## 2020-11-03 PROCEDURE — 96375 TX/PRO/DX INJ NEW DRUG ADDON: CPT

## 2020-11-03 PROCEDURE — 96374 THER/PROPH/DIAG INJ IV PUSH: CPT

## 2020-11-03 PROCEDURE — 81001 URINALYSIS AUTO W/SCOPE: CPT

## 2020-11-03 PROCEDURE — 84484 ASSAY OF TROPONIN QUANT: CPT

## 2020-11-03 PROCEDURE — 85025 COMPLETE CBC W/AUTO DIFF WBC: CPT

## 2020-11-03 PROCEDURE — 83690 ASSAY OF LIPASE: CPT

## 2020-11-03 PROCEDURE — 82805 BLOOD GASES W/O2 SATURATION: CPT

## 2020-11-03 PROCEDURE — 2580000003 HC RX 258: Performed by: EMERGENCY MEDICINE

## 2020-11-03 PROCEDURE — 96372 THER/PROPH/DIAG INJ SC/IM: CPT

## 2020-11-03 PROCEDURE — 93005 ELECTROCARDIOGRAM TRACING: CPT | Performed by: EMERGENCY MEDICINE

## 2020-11-03 PROCEDURE — 6370000000 HC RX 637 (ALT 250 FOR IP): Performed by: EMERGENCY MEDICINE

## 2020-11-03 PROCEDURE — 70450 CT HEAD/BRAIN W/O DYE: CPT

## 2020-11-03 PROCEDURE — 82962 GLUCOSE BLOOD TEST: CPT

## 2020-11-03 PROCEDURE — 96361 HYDRATE IV INFUSION ADD-ON: CPT

## 2020-11-03 PROCEDURE — 74176 CT ABD & PELVIS W/O CONTRAST: CPT

## 2020-11-03 RX ORDER — ONDANSETRON 2 MG/ML
4 INJECTION INTRAMUSCULAR; INTRAVENOUS EVERY 30 MIN PRN
Status: DISCONTINUED | OUTPATIENT
Start: 2020-11-03 | End: 2020-11-03 | Stop reason: HOSPADM

## 2020-11-03 RX ORDER — SODIUM CHLORIDE 9 MG/ML
INJECTION, SOLUTION INTRAVENOUS CONTINUOUS
Status: DISCONTINUED | OUTPATIENT
Start: 2020-11-03 | End: 2020-11-03 | Stop reason: HOSPADM

## 2020-11-03 RX ORDER — DICYCLOMINE HYDROCHLORIDE 10 MG/ML
20 INJECTION INTRAMUSCULAR ONCE
Status: COMPLETED | OUTPATIENT
Start: 2020-11-03 | End: 2020-11-03

## 2020-11-03 RX ORDER — HYOSCYAMINE SULFATE 0.5 MG/ML
250 INJECTION, SOLUTION SUBCUTANEOUS ONCE
Status: COMPLETED | OUTPATIENT
Start: 2020-11-03 | End: 2020-11-03

## 2020-11-03 RX ORDER — HYDROXYZINE PAMOATE 25 MG/1
25 CAPSULE ORAL ONCE
Status: COMPLETED | OUTPATIENT
Start: 2020-11-03 | End: 2020-11-03

## 2020-11-03 RX ADMIN — DICYCLOMINE HYDROCHLORIDE 20 MG: 20 INJECTION, SOLUTION INTRAMUSCULAR at 15:27

## 2020-11-03 RX ADMIN — ONDANSETRON 4 MG: 2 INJECTION INTRAMUSCULAR; INTRAVENOUS at 15:27

## 2020-11-03 RX ADMIN — SODIUM CHLORIDE: 9 INJECTION, SOLUTION INTRAVENOUS at 15:27

## 2020-11-03 RX ADMIN — HYDROXYZINE PAMOATE 25 MG: 25 CAPSULE ORAL at 17:45

## 2020-11-03 RX ADMIN — HYOSCYAMINE SULFATE 250 MCG: 0.5 INJECTION, SOLUTION SUBCUTANEOUS at 17:11

## 2020-11-03 ASSESSMENT — PAIN SCALES - GENERAL: PAINLEVEL_OUTOF10: 8

## 2020-11-03 ASSESSMENT — PAIN DESCRIPTION - LOCATION: LOCATION: ABDOMEN

## 2020-11-03 ASSESSMENT — PAIN DESCRIPTION - ORIENTATION: ORIENTATION: RIGHT

## 2020-11-03 NOTE — ED NOTES
Patient assisted at this time to use the a bedpan. Pt was able to use bedpan and sit up on her own without difficulty.       Kevin Chandra RN  11/03/20 [id number]

## 2020-11-03 NOTE — ED NOTES
Pt. Requesting pain medicine at this time, notified Dr. Kalli Sol.      Jennifer Acevedo  11/03/20 [id number]

## 2020-11-03 NOTE — ED TRIAGE NOTES
Patient brought back to Trauma 4 immediately from waiting room after having a seizure in the lobby. Patient went down in the waiting room and has not been responsive since the episode.

## 2020-11-03 NOTE — ED NOTES
Pt. reviewed discharge instructions and follow up isntructions. Pt.  given printed prescriptions. Pt. verbalizes understanding with no further questions. Pt. Wheeled out and not showing any signs of distress. Port was de-accessed.        Thiago Trujillo  11/03/20 [id number]

## 2020-11-03 NOTE — ED NOTES
This nurse attempted to discharge patient at this time; pt states that she would like to speak with Dr Janeth Greenwood before he is discharged.       Marcello Barnes RN  11/03/20 [id number]

## 2020-11-03 NOTE — ED PROVIDER NOTES
LFTs, and CBC was unremarkable apart from hemoglobin 10.9. The patient is hemodynamically stable. The patient did have an  extensive GI workup done in the past including multiple EGDs, at least  three EGDs done by me, the last EGD was on 07/22/2019 with postsurgical  changes noted from previous sleeve gastrectomy and gastritis with old  blood noted. There was no evidence of active peptic ulcer disease. The  patient also subsequently has been followed up regularly by Dr. Gatito Cisse  who has performed at least four EGDs and the first EGD was on  08/27/2018, which showed postsurgical changes from sleeve gastrectomy  along with mild gastritis; second EGD on 03/12/2019 for upper GI  bleeding, which was unremarkable; third EGD on 04/02/2019, which showed  bleeding from the distal staple line, which was injected with  epinephrine solution; and the fourth EGD by Dr. Gatito Cisse was on  06/19/2019 and again was unremarkable. The patient did have a colonoscopy done in the past as well. The  patient also is being followed up by Dr. Sumaya Doan for pain management. The patient was admitted at [facility] approximately six to  eight weeks ago for a couple of days for chronic right upper quadrant  abdominal pain and possible ERCP, but no ERCP was performed and the  patient was discharged home to be followed up locally. The patient did  have an MRCP done on 09/25/2020, which was unremarkable. The patient is  hemodynamically stable. \"      ROS - see HPI, below listed is current ROS at time of my eval:  10 systems reviewed and negative except as above.          Past Medical History:   Diagnosis Date    Acid reflux     Anemia     Anxiety     Arthritis     Hands, Back And Ankles    Bleeding ulcer 2014    \"I Had Ulcers In My Stomach And Colon\"/ per pt on 8/12/2019\"they said recently having some blood in my stomach- in July ( 2019)could not find where coming from \"    Bronchitis Last Episode 2014    Chronic back pain     Chronic pain     Sees Dr. Gautam Estrada COPD (chronic obstructive pulmonary disease) [facility])     Sees Dr. Jcarlos Dockery Depression     Fibromyalgia Dx 2013    H/O echocardiogram 8/11/15    EF >55%. LA to be at the upper limit of normal in size. LV hypertrophy with normal LV systolic, but abnormal diastolic function. Normal valvular structures and function.  H/O echocardiogram 08/30/2018    EF 55-60%    Hiatal hernia     History of blood transfusion 09/2015 And 2018    No Reaction To Blood Transfusions Received    [place]     Right Ear    Hx of cardiac catheterization 10/24/2010    Angiographically normal coronary arteries w/ normal LV function and wall motion.  Hx of transesophageal echocardiography (PILO) for monitoring 12/2/2010    EF 55-60%. WNL.    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 name    Kidney cysts     \"they found that I have a kidney cyst but not sure which side\" per pt on 7/15/2020    Lupus ([address] Dx 2013    \"Rheumatoid Lupus\"    MDRO (multiple drug resistant organisms) resistance     4 months ago chest from mediport    Morbid obesity ([address]     MRSA bacteremia     Nausea     \"Most Of The Time\"    Pain management     Sees Dr. Frankie Bhatti, Once A Month    Pancreatitis chronic Dx 2001    Pneumonia Dx 11-15    Shortness of breath on exertion     Shortness of breath on exertion     Sleep apnea     Has CPAP\"no longer use the cpap since lost weight\"    Staph infection Dx 11-15    Left Foot    Staph infection Dx 11-15    \"Left Foot\"    Teeth missing     Upper And Lower    Thyroid disease     Wears glasses     To Read     Past Surgical History:   Procedure Laterality Date    APPENDECTOMY  02/1998    Done When Tubes And Ovaries Were Removed    CARDIAC CATHETERIZATION  10/24/2010    CATHETER REMOVAL N/A 7/16/2019    PORT REMOVAL performed by Chris Garner MD at [facility]  08/27/1991    CHOLECYSTECTOMY, LAPAROSCOPIC  1990's    COLONOSCOPY  Last Done In 2000's    DENTAL SURGERY      Teeth Extracted In Past    ENDOSCOPY, COLON, DIAGNOSTIC  Last Done In 2018    FOOT DEBRIDEMENT Left 6/16/2019    FIRST METATARSAL DEBRIDEMENT INCISION AND DRAINAGE. EXCISION OF ULCER.  RESECTION OF BONE. 1ST METATARSAL POWER LAVAGE AND BONE CEMENT performed by Donna Baxter DPM at [address] Left Last Done In 2016     Dr. Rabia Villar, \" About 6 Surgeries Done Because Of Staph Infection\"    HIATAL HERNIA REPAIR N/A 2/12/2019    HERNIA HIATAL LAPAROSCOPIC ROBOTIC performed by Chris Garner MD at [address] [facility] [address]  10/1997    Partial Abdominal Hysterectomy    INSERTION / REMOVAL / REPLACEMENT VENOUS ACCESS CATHETER N/A 8/15/2019    PORT INSERTION performed by Chris Garner MD at [address] [facility]    removed after 6 months    LUNG REMOVAL, PARTIAL Left 2008    Benign    OTHER SURGICAL HISTORY  02/1998    \"Tubes And Ovaries Removed, Appendectomy Also Done\"    OTHER SURGICAL HISTORY  Last Done 7-15-16    Mediport Insertion \"Total Of Six Done, Removed Last Mediport In 2014\"    PORT SURGERY N/A 1/15/2020    PORT REMOVAL performed by Chris Garner MD at [address] N/A 7/6/2020    PORT INSERTION performed by Chris Garner MD at [facility] N/A 2/12/2019    GASTRECTOMY SLEEVE LAPAROSCOPIC ROBOTIC performed by Chris Garner MD at [facility]  08/27/2018    UPPER GASTROINTESTINAL ENDOSCOPY N/A 4/2/2019    EGD CONTROL HEMORRHAGE with epi injection at bleeding site performed by Chris Garner MD at [address] [place] [address] GASTROINTESTINAL ENDOSCOPY N/A 6/19/2019    EGD DIAGNOSTIC ONLY performed by Sumi Gilbert MD at [address] [place] [address] N/A 7/22/2019    EGD DIAGNOSTIC ONLY performed by Chaparro Humphrey MD at [address] [place] [address] N/A 10/14/2019    EGD DIAGNOSTIC ONLY performed by Sumi Gilbert MD at [address] [place] [address] N/A 7/17/2020    EGJ DILATATION BALLOON WITH 18-20 MM BALLOON, DILATED TO 20 MM. performed by Sumi Gilbert MD at [facility] ENDOSCOPY     Family History   Problem Relation Age of Onset    Diabetes Mother         \"Borderline Diabetes\"    High Blood Pressure Mother     Obesity Mother     Arthritis Mother     Heart Disease Mother     High Cholesterol Mother     Vision Loss Mother     Diabetes Father     High Blood Pressure Father     Asthma Father     Cancer Father         prostate cancer    High Blood Pressure Brother     Asthma Son     Vision Loss Son     Lupus Daughter     Other Daughter         \"Alot Of Female Problems\"     Social History     Socioeconomic History    Marital status:      Spouse name: Not on file    Number of children: Not on file    Years of education: Not on file    Highest education level: Not on file   Occupational History    Not on file   Social Needs    Financial resource strain: Not on file    Food insecurity     Worry: Not on file     Inability: Not on file    Transportation needs     Medical: Not on file     Non-medical: Not on file   Tobacco Use    Smoking status: Never Smoker    Smokeless tobacco: Never Used   Substance and Sexual Activity    Alcohol use: No     Alcohol/week: 0.0 standard drinks    Drug use: No    Sexual activity: Not Currently   Lifestyle    Physical activity     Days per week: Not on file     Minutes per session: Not on file    Stress: Not on file   Relationships    Social connections     Talks on phone: Not on file     Gets together: Not MULTI-FORMULA, CHEW TAB) Take 1 tablet by mouth daily 30 tablet 5    Calcium Citrate-Vitamin D (CALCIUM + VIT D, BARIATRIC ADVANTAGE, CHEWABLE TABLET) Take 1 tablet by mouth 2 times daily 120 tablet 5    levothyroxine (SYNTHROID) 125 MCG tablet Take 1 tablet by mouth Daily (Patient taking differently: Take 125 mcg by mouth nightly ) 30 tablet 0    gabapentin (NEURONTIN) 800 MG tablet Take 800 mg by mouth 3 times daily      PARoxetine (PAXIL) 30 MG tablet Take 50 mg by mouth nightly        Allergies   Allergen Reactions    Aspirin Palpitations     \"My Heart Rate Elevates\"    Shellfish-Derived Products Swelling    Toradol [Ketorolac Tromethamine] Rash    Compazine [Prochlorperazine]     Reglan [Metoclopramide] Itching       Nursing Notes Reviewed    Physical Exam:  ED Triage Vitals   Enc Vitals Group      BP 11/03/20 1419 131/76      Pulse 11/03/20 1419 60      Resp 11/03/20 1420 18      Temp --       Temp src --       SpO2 11/03/20 1420 100 %      Weight 11/03/20 1531 280 lb (127 kg)      Height 11/03/20 1531 5' 4\" (1.626 m)      Head Circumference --       Peak Flow --       Pain Score --       Pain Loc --       Pain Edu? --       Excl. in [address]? --          My pulse ox interpretation is - normal    General appearance: brought back on cot from triage. Her heart rate was in the 60s, she was partially biting her lip but her jaw was not tight. When lifted her hand above her face she does not allow it to hit her face. However she would not open eyes or answer questions. Resisted me opening her eyes with my fingers. Skin:  Warm. Dry. Eye:  Extraocular movements intact. Ears, nose, mouth and throat:  Oral mucosa moist   Neck:  Trachea midline. Extremity:  No swelling. Normal ROM     Heart:  Regular rate and rhythm, normal S1 & S2, no extra heart sounds. Perfusion:  intact  Respiratory:  Lungs clear to auscultation bilaterally. Respirations nonlabored. Abdominal:  Soft. Nontender.   Non distended. Neurological:  Initially patient partially biting her lower lip but jaw not rigid, When lifted her hand above her face she does not allow it to hit her face. she would not open eyes or answer questions. Resisted me opening her eyes with my fingers.           I have reviewed and interpreted all of the currently available lab results from this visit (if applicable):  Results for orders placed or performed during the hospital encounter of 11/03/20   CBC Auto Differential   Result Value Ref Range    WBC 4.6 4.0 - 10.5 K/CU MM    RBC 4.25 4.2 - 5.4 M/CU MM    Hemoglobin 10.9 (L) 12.5 - 16.0 GM/DL    Hematocrit 36.0 (L) 37 - 47 %    MCV 84.7 78 - 100 FL    MCH 25.6 (L) 27 - 31 PG    MCHC 30.3 (L) 32.0 - 36.0 %    RDW 13.9 11.7 - 14.9 %    Platelets 805 263 - 903 K/CU MM    MPV 9.7 7.5 - 11.1 FL    Differential Type AUTOMATED DIFFERENTIAL     Segs Relative 51.7 36 - 66 %    Lymphocytes % 35.7 24 - 44 %    Monocytes % 7.6 (H) 0 - 4 %    Eosinophils % 4.1 (H) 0 - 3 %    Basophils % 0.7 0 - 1 %    Segs Absolute 2.4 K/CU MM    Lymphocytes Absolute 1.6 K/CU MM    Monocytes Absolute 0.4 K/CU MM    Eosinophils Absolute 0.2 K/CU MM    Basophils Absolute 0.0 K/CU MM    Nucleated RBC % 0.0 %    Total Nucleated RBC 0.0 K/CU MM    Total Immature Neutrophil 0.01 K/CU MM    Immature Neutrophil % 0.2 0 - 0.43 %   Comprehensive Metabolic Panel   Result Value Ref Range    Sodium 132 (L) 135 - 145 MMOL/L    Potassium 4.1 3.5 - 5.1 MMOL/L    Chloride 101 99 - 110 mMol/L    CO2 20 (L) 21 - 32 MMOL/L    BUN 19 6 - 23 MG/DL    CREATININE 0.7 0.6 - 1.1 MG/DL    Glucose 87 70 - 99 MG/DL    Calcium 9.7 8.3 - 10.6 MG/DL    Alb 3.8 3.4 - 5.0 GM/DL    Total Protein 6.6 6.4 - 8.2 GM/DL    Total Bilirubin 0.2 0.0 - 1.0 MG/DL    ALT 17 10 - 40 U/L    AST 21 15 - 37 IU/L    Alkaline Phosphatase 90 40 - 129 IU/L    GFR Non-African American >60 >60 mL/min/1.73m2    GFR African American >60 >60 mL/min/1.73m2    Anion Gap 11 4 - 16   Troponin Result Value Ref Range    Troponin T <0.010 <0.01 NG/ML   Urinalysis   Result Value Ref Range    Color, UA YELLOW YELLOW    Clarity, UA CLEAR CLEAR    Glucose, Urine NEGATIVE NEGATIVE MG/DL    Bilirubin Urine NEGATIVE NEGATIVE MG/DL    Ketones, Urine NEGATIVE NEGATIVE MG/DL    Specific Gravity, UA 1.023 1.001 - 1.035    Blood, Urine NEGATIVE NEGATIVE    pH, Urine 5.0 5.0 - 8.0    Protein, UA NEGATIVE NEGATIVE MG/DL    Urobilinogen, Urine NORMAL 0.2 - 1.0 MG/DL    Nitrite Urine, Quantitative NEGATIVE NEGATIVE    Leukocyte Esterase, Urine NEGATIVE NEGATIVE    RBC, UA <1 0 - 6 /HPF    WBC, UA 9 (H) 0 - 5 /HPF    Bacteria, UA NEGATIVE NEGATIVE /HPF    Squam Epithel, UA 4 /HPF    Mucus, UA RARE (A) NEGATIVE HPF    Trichomonas, UA NONE SEEN NONE SEEN /HPF   Blood Gas, Venous   Result Value Ref Range    pH, Brayden 7.33 7.32 - 7.42    pCO2, Brayden 43 38 - 52 mmHG    pO2, Brayden 41 28 - 48 mmHG    Base Excess 3 (H) 0 - 2.4    HCO3, Venous 22.7 19 - 25 MMOL/L    O2 Sat, Brayden 75.1 (H) 50 - 70 %    Comment VBG    Lipase   Result Value Ref Range    Lipase 18 13 - 60 IU/L   POCT Glucose   Result Value Ref Range    POC Glucose 87 70 - 99 MG/DL   EKG 12 Lead   Result Value Ref Range    Ventricular Rate 60 BPM    Atrial Rate 60 BPM    P-R Interval 184 ms    QRS Duration 82 ms    Q-T Interval 402 ms    QTc Calculation (Bazett) 402 ms    P Axis 36 degrees    R Axis -19 degrees    T Axis 14 degrees    Diagnosis       Normal sinus rhythm  Minimal voltage criteria for LVH, may be normal variant  Borderline ECG  When compared with ECG of 17-AUG-2020 13:55,  T wave inversion no longer evident in Anterior leads        Radiographs (if obtained):  Radiologist's Report Reviewed:  No results found. EKG (if obtained): (All EKG's are interpreted by myself in the absence of a cardiologist)  Normal sinus rhythm with rate of 60bpm, normal intervals, no ST elevation. No previous to compare.        MDM:  Patient was brought back from the lobby at [phone] for concern for seizure-like activity. Was in a wheelchair waiting to be triaged and had jerking in her extremities and fell out of the chair to the ground, did not strike her head per nursing that witnessed. Lifted onto a cot and brought back to room TR4, patient not answering questions or opening her eyes. Her heart rate was in the 60s, she was partially biting her lip but her jaw was not tight. When lifted her hand above her face she does not allow it to hit her face. However she would not open eyes or answer questions. Resisted me opening her eyes with my fingers. On review of the chart appears that she had been here recently for chronic pancreatitis, has been referred to [facility]. I ordered CT abdomen pelvis, CT head, chest x-ray, labs, blood pressure normal.  She is not requiring oxygen. accucheck 87. Shortly after patient was not post ictal, able to answer questions. Was not incontinent during episode. Vitals remained normal. Tells me about most recent hospitalization and HPI, states the pain brings on her seizures. Exam with mild abdominal tenderness without rebound or guarding. Otherwise unremarkable. I have reviewed her extensive history (included above) and did discuss with her that we will not provide narcotic medications but will workup her abdominal pain and headache and possible seizure. She expressed understanding. Bentyl, zofran and fluids ordered. Patient's K is normal, Na 132, bicarb 20 with normal anion gap, normal kidney function. WBC normal, Hgb stable compared to previous. Trop normal. LFTs completely normal and lipase normal. Awaiting imaging. Vitals remain completely normal. Has not vomited here once. 32 61 16- patient's nurse asked me to come to room, patient groaning and yelling, eyes closed, lifted arm above head and she puts it back to her side, shaking all her limbs, never stopped breathing, pulse ox remained normal, HR remained 60s. Episode lasted <60 seconds. No incontinence. Patient completely at baseline after. I am not concerned that this was an epileptic seizure, appears to be having pseudoseizures. CT head, abdomen and pelvis with no acute abnormalities, same with CXR. I will consult her PCP to discuss but I do not believe she will require emergent hospitalization. These appear to be chronic problems and she is well established with multiple specialists as well as pain management and referred to [address] [place] [address] for appt on the 10th as well when I spoke with her. 1630- spoke with Dr. Nicky Delgado, agrees that this is all chronic with current labs, workup, vitals, etc. He is happy to see her in the office, no need for admission. I had spoken with patient regarding all of her results and that we would be able to send her home. She was agreeable, asked for something for pain. I again reiterated that she could have nonnarcotic medications. She does have some allergies and so was given a dose of Levsin. Plan will be for discharge home. Has had absolutely no vomiting here. She has Zofran and Phenergan for home. 36- nurse asked that I go back to speak to patient as she is requesting to speak to me again. She states that she is not here for narcotics but she needs something for her head, states that she has a scopolamine patch because she is always dizzy, she takes Ativan \"for my head\". She takes Percocet, Zofran, Phenergan, Neurontin. She tells me that she just feels like she is so anxious that she will just come right back. I again explained that I would not provide her controlled substances but we could do a dose of hydroxyzine but at this time I do not believe she requires emergent hospitalization. She expressed understanding of this, but states that she has pain all of the time.   I again explained to her that this is all chronic issues and that well I know that she is in pain and we want her to get better, that we would not be able to fix her chronic issues in the emergency department, we screen for medical emergencies that would require hospitalization, intervention, surgical evaluation, etc.  She expressed understanding, asked if she should call her mother for a ride, I told her that she can call her mom and I will give her the dose of hydroxyzine and then she will be able to leave. She can take her home medications when she gets home, should not interact with the medications we have given here. She expressed understanding. Given return precautions, discharged in stable condition    Clinical Impression:  1. Chronic abdominal pain    2. Nonintractable headache, unspecified chronicity pattern, unspecified headache type    3. Pseudoseizures      Disposition referral (if applicable):  Ap Singer MD  [address] [place] [address]  [phone]    Schedule an appointment as soon as possible for a visit       [facility] Emergency Department  [address] [place] [id number] [phone]    If symptoms worsen    Disposition medications (if applicable):  Discharge Medication List as of 11/3/2020  5:03 PM        ED Provider Disposition Time  DISPOSITION Decision To Discharge 11/03/2020 06:04:36 PM      Comment: Please note this report has been produced using speech recognition software and may contain errors related to that system including errors in grammar, punctuation, and spelling, as well as words and phrases that may be inappropriate. Efforts were made to edit the dictations.         Jamaal Gtz MD  11/03/20 7460

## 2020-11-04 PROCEDURE — 93010 ELECTROCARDIOGRAM REPORT: CPT | Performed by: INTERNAL MEDICINE

## 2020-11-10 LAB
EKG ATRIAL RATE: 60 BPM
EKG DIAGNOSIS: NORMAL
EKG P AXIS: 36 DEGREES
EKG P-R INTERVAL: 184 MS
EKG Q-T INTERVAL: 402 MS
EKG QRS DURATION: 82 MS
EKG QTC CALCULATION (BAZETT): 402 MS
EKG R AXIS: -19 DEGREES
EKG T AXIS: 14 DEGREES
EKG VENTRICULAR RATE: 60 BPM

## 2020-11-30 ENCOUNTER — TELEPHONE (OUTPATIENT)
Dept: BARIATRICS/WEIGHT MGMT | Age: 50
End: 2020-11-30

## 2020-11-30 NOTE — TELEPHONE ENCOUNTER
Pt called asking for a refill on Phenergan sent to [facility] on [address] in [facility]

## 2020-12-02 RX ORDER — PROMETHAZINE HYDROCHLORIDE 25 MG/1
25 TABLET ORAL EVERY 6 HOURS PRN
Qty: 30 TABLET | Refills: 3 | Status: SHIPPED | OUTPATIENT
Start: 2020-12-02 | End: 2020-12-09

## 2020-12-09 ENCOUNTER — OFFICE VISIT (OUTPATIENT)
Dept: BARIATRICS/WEIGHT MGMT | Age: 50
End: 2020-12-09
Payer: COMMERCIAL

## 2020-12-09 VITALS
HEART RATE: 69 BPM | SYSTOLIC BLOOD PRESSURE: 128 MMHG | OXYGEN SATURATION: 96 % | BODY MASS INDEX: 47.85 KG/M2 | HEIGHT: 64 IN | WEIGHT: 280.3 LBS | TEMPERATURE: 97.6 F | DIASTOLIC BLOOD PRESSURE: 84 MMHG

## 2020-12-09 PROCEDURE — G8417 CALC BMI ABV UP PARAM F/U: HCPCS | Performed by: SURGERY

## 2020-12-09 PROCEDURE — 3017F COLORECTAL CA SCREEN DOC REV: CPT | Performed by: SURGERY

## 2020-12-09 PROCEDURE — G8427 DOCREV CUR MEDS BY ELIG CLIN: HCPCS | Performed by: SURGERY

## 2020-12-09 PROCEDURE — G8484 FLU IMMUNIZE NO ADMIN: HCPCS | Performed by: SURGERY

## 2020-12-09 PROCEDURE — 99213 OFFICE O/P EST LOW 20 MIN: CPT | Performed by: SURGERY

## 2020-12-09 PROCEDURE — 1036F TOBACCO NON-USER: CPT | Performed by: SURGERY

## 2020-12-09 ASSESSMENT — ENCOUNTER SYMPTOMS
ANAL BLEEDING: 0
WHEEZING: 0
SORE THROAT: 0
VOMITING: 0
ABDOMINAL PAIN: 1
VOICE CHANGE: 0
CONSTIPATION: 0
NAUSEA: 0
SHORTNESS OF BREATH: 0
BLOOD IN STOOL: 0
COUGH: 0
PHOTOPHOBIA: 0
TROUBLE SWALLOWING: 0
COLOR CHANGE: 0
DIARRHEA: 0

## 2020-12-09 NOTE — PROGRESS NOTES
BARIATRIC SURGERY POST OPERATIVE NOTE    SUBJECTIVE:    Patient presenting today referred from Lin Thomas MD, for   Chief Complaint   Patient presents with   [facility] Post Op Follow Up     7th S/P Gastric Sleeve, 2/12/19; Discuss conversion to RYGB     /84 (Site: Right Upper Arm, Position: Sitting, Cuff Size: Large Adult)   Pulse 69   Temp 97.6 °F (36.4 °C) (Infrared)   Ht 5' 4.25\" (1.632 m)   Wt 280 lb 4.8 oz (127.1 kg)   SpO2 96%   BMI 47.74 kg/m²      HPI: Frank Trujillo is a 44 y.o. female Date of Surgery: 2/12/19    Type of Surgery: Gastric Sleeve    BMI:  Obesity Classification: Class III  Today's weight is 280.3 lbs, last visits weight was   Wt Readings from Last 3 Encounters:   12/09/20 280 lb 4.8 oz (127.1 kg)   11/03/20 280 lb (127 kg)   10/23/20 280 lb 4.8 oz (127.1 kg)   , total gain/loss is -0lbs    Weight History: Wt Readings from Last 3 Encounters:   12/09/20 280 lb 4.8 oz (127.1 kg)   11/03/20 280 lb (127 kg)   10/23/20 280 lb 4.8 oz (127.1 kg)       Total weight loss/gain -30.4  Lbs over 22 month. How pleased are you with your current weight loss: No  If you are disappointed, what do you think is preventing you from increased weight loss? Is patient experiencing any physical problems post surgery: Yes, lupus and foot  Is patient experiencing any dietary problems post surgery: No:  Food Intolerances: Is not tolerating  All meats except chicken. How hungry are you? Not Really  How full do you feel after eating? Full  How fast do you eat? 20-30 mins  Food log brought to appointment today: No    Breakfast: no  Mid-AM Snack: no  Lunch: yes  Mid-PM Snack: no  Dinner: yes  Evening Snack: no    Liquids Consumed: at least 64 oz per day. Times of day liquids consumed: Throughout  Patient taking protein supplement: Yes.   Brand of Supplement: Smoothie  Patient taking multivitamins: Yes  Does patient exercise: rarely  What prevents you from exercising: Foot, Weather    Addressed the status of the following co-morbidities:   1. GERD improving. 2. Nausea  improving. 3. Arthritis  worsening. Current Outpatient Medications:     promethazine (PHENERGAN) 25 MG tablet, Take 1 tablet by mouth every 6 hours as needed for Nausea, Disp: 30 tablet, Rfl: 3    ferrous sulfate (IRON 325) 325 (65 Fe) MG tablet, Take 1 tablet by mouth daily (with breakfast), Disp: 90 tablet, Rfl: 5    sucralfate (CARAFATE) 1 GM tablet, Take 1 tablet by mouth every 12 hours, Disp: 120 tablet, Rfl: 3    albuterol sulfate  (90 Base) MCG/ACT inhaler, , Disp: , Rfl:     ondansetron (ZOFRAN ODT) 4 MG disintegrating tablet, Take 1 tablet by mouth every 8 hours as needed for Nausea or Vomiting, Disp: 30 tablet, Rfl: 5    levETIRAcetam (KEPPRA) 1000 MG tablet, Take 1 tablet by mouth 2 times daily, Disp: 60 tablet, Rfl: 5    pantoprazole (PROTONIX) 40 MG tablet, Take 1 tablet by mouth every morning (before breakfast), Disp: 90 tablet, Rfl: 1    dicyclomine (BENTYL) 10 MG capsule, Take 1 capsule by mouth 3 times daily as needed (abdominal pain), Disp: 21 capsule, Rfl: 3    oxyCODONE-acetaminophen (PERCOCET) 5-325 MG per tablet, Take 1 tablet by mouth.  Every 4-6 hours PRN, Disp: , Rfl:     Cholecalciferol (VITAMIN D3) 5000 units TABS, Take 1 tablet by mouth daily, Disp: , Rfl:     estradiol (ESTRACE) 0.5 MG tablet, Take 0.5 mg by mouth daily, Disp: , Rfl:     atenolol (TENORMIN) 25 MG tablet, Take 25 mg by mouth daily, Disp: , Rfl:     Multiple Vitamin (MVI, BARIATRIC ADVANTAGE MULTI-FORMULA, CHEW TAB), Take 1 tablet by mouth daily, Disp: 30 tablet, Rfl: 5    Calcium Citrate-Vitamin D (CALCIUM + VIT D, BARIATRIC ADVANTAGE, CHEWABLE TABLET), Take 1 tablet by mouth 2 times daily, Disp: 120 tablet, Rfl: 5    levothyroxine (SYNTHROID) 125 MCG tablet, Take 1 tablet by mouth Daily (Patient taking differently: Take 125 mcg by mouth nightly ), Disp: 30 tablet, Rfl: 0    gabapentin (NEURONTIN) 800 MG tablet, Take 800 mg by mouth 3 times daily, Disp: , Rfl:     PARoxetine (PAXIL) 30 MG tablet, Take 50 mg by mouth nightly , Disp: , Rfl:   Past Medical History:   Diagnosis Date    Acid reflux     Anemia     Anxiety     Arthritis     Hands, Back And Ankles    Bleeding ulcer 2014    \"I Had Ulcers In My Stomach And Colon\"/ per pt on 8/12/2019\"they said recently having some blood in my stomach- in July ( 2019)could not find where coming from \"    Bronchitis Last Episode 2014    Chronic back pain     Chronic pain     Sees Dr. Gautam Estrada COPD (chronic obstructive pulmonary disease) ([facility] [address]     Sees Dr. Jcarlos Dockery Depression     Fibromyalgia Dx 2013    H/O echocardiogram 8/11/15    EF >55%. LA to be at the upper limit of normal in size. LV hypertrophy with normal LV systolic, but abnormal diastolic function. Normal valvular structures and function.  H/O echocardiogram 08/30/2018    EF 55-60%    Hiatal hernia     History of blood transfusion 09/2015 And 2018    No Reaction To Blood Transfusions Received    [place] (hard of hearing)     Right Ear    Hx of cardiac catheterization 10/24/2010    Angiographically normal coronary arteries w/ normal LV function and wall motion.  Hx of transesophageal echocardiography (PILO) for monitoring 12/2/2010    EF 55-60%. WNL.    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 name    Kidney cysts     \"they found that I have a kidney cyst but not sure which side\" per pt on 7/15/2020    Lupus ([facility] [address] Dx 2013    \"Rheumatoid Lupus\"    MDRO (multiple drug resistant organisms) resistance     4 months ago chest from [address] obesity ([facility] [address]     MRSA bacteremia     Nausea     \"Most Of The Time\"    Pain management     Sees Dr. Jennifer Tai, Once A Month    Pancreatitis chronic Dx 2001    Pneumonia Dx 11-15    Shortness of breath on exertion     Shortness of breath on exertion     Sleep apnea     Has CPAP\"no longer use the cpap since lost weight\"    Staph infection Dx 11-15    Left Foot    Staph infection Dx 11-15    \"Left Foot\"    Teeth missing     Upper And Lower    Thyroid disease     Wears glasses     To Read      Past Surgical History:   Procedure Laterality Date    APPENDECTOMY  02/1998    Done When Tubes And Ovaries Were Removed    CARDIAC CATHETERIZATION  10/24/2010    CATHETER REMOVAL N/A 7/16/2019    PORT REMOVAL performed by Chris Garner MD at [address]  08/27/1991    CHOLECYSTECTOMY, LAPAROSCOPIC  1990's    COLONOSCOPY  Last Done In 2000's    DENTAL SURGERY      Teeth Extracted In Past    ENDOSCOPY, COLON, DIAGNOSTIC  Last Done In 2018    FOOT DEBRIDEMENT Left 6/16/2019    FIRST METATARSAL DEBRIDEMENT INCISION AND DRAINAGE. EXCISION OF ULCER.  RESECTION OF BONE. 1ST METATARSAL POWER LAVAGE AND BONE CEMENT performed by Donna Baxter DPM at [place] [address]. Left Last Done In 2016     Dr. Rabia Villar, \" About 6 Surgeries Done Because Of Staph Infection\"    HIATAL HERNIA REPAIR N/A 2/12/2019    HERNIA HIATAL LAPAROSCOPIC ROBOTIC performed by Chris Garner MD at [facility]  10/1997    Partial Abdominal Hysterectomy    INSERTION / REMOVAL / REPLACEMENT VENOUS ACCESS CATHETER N/A 8/15/2019    PORT INSERTION performed by Chris Garner MD at [address] [place] [address]    removed after 6 months    LUNG REMOVAL, PARTIAL Left 2008    Benign    OTHER SURGICAL HISTORY  02/1998    \"Tubes And Ovaries Removed, Appendectomy Also Done\"    OTHER SURGICAL HISTORY  Last Done 7-15-16    Mediport Insertion \"Total Of Six Done, Removed Last Mediport In 2014\"    PORT SURGERY N/A 1/15/2020    PORT REMOVAL performed by Margarita Roberson MD at [facility] N/A 7/6/2020    PORT INSERTION performed by Margarita Roberson MD at [address]  N/A 2/12/2019    GASTRECTOMY SLEEVE LAPAROSCOPIC ROBOTIC performed by Margarita Roberson MD at [facility]  08/27/2018    UPPER GASTROINTESTINAL ENDOSCOPY N/A 4/2/2019    EGD CONTROL HEMORRHAGE with epi injection at bleeding site performed by Margarita Roberson MD at [address] [facility] N/A 6/19/2019    EGD DIAGNOSTIC ONLY performed by Margarita Roberson MD at [address] [facility] N/A 7/22/2019    EGD DIAGNOSTIC ONLY performed by Dominique Mcmanus MD at [address] [facility] N/A 10/14/2019    EGD DIAGNOSTIC ONLY performed by Margarita Roberson MD at [address] [facility] N/A 7/17/2020    EGJ DILATATION BALLOON WITH 18-20 MM BALLOON, DILATED TO 20 MM. performed by Margarita Roberson MD at [facility] History     Socioeconomic History    Marital status:      Spouse name: None    Number of children: None    Years of education: None    Highest education level: None   Occupational History    None   Social Needs    Financial resource strain: None    Food insecurity     Worry: None     Inability: None    Transportation needs     Medical: None     Non-medical: None   Tobacco Use    Smoking status: Never Smoker    Smokeless tobacco: Never Used   Substance and Sexual Activity    Alcohol use: No     Alcohol/week: 0.0 standard drinks    Drug use: No    Sexual activity: Not Currently   Lifestyle    Physical activity     Days per week: None     Minutes per session: None    Stress: None   Relationships    Social connections     Talks on phone: None     Gets together: None     Attends Sabianist service: None     Active member of club or organization: None     Attends meetings of clubs or organizations: None     Relationship status: None    Intimate partner violence     Fear of current or ex partner: None     Emotionally abused: None     Physically abused: None     Forced sexual activity: None   Other Topics Concern    None   Social History Narrative    None     Review of Systems   Constitutional: Negative for activity change, chills, diaphoresis and fever. HENT: Negative for sore throat, trouble swallowing and voice change. Eyes: Negative for photophobia and visual disturbance. Respiratory: Negative for cough, shortness of breath and wheezing. Cardiovascular: Negative for chest pain, palpitations and leg swelling. Gastrointestinal: Positive for abdominal pain. Negative for anal bleeding, blood in stool, constipation, diarrhea, nausea and vomiting. Endocrine: Negative for cold intolerance, heat intolerance, polydipsia and polyuria. Genitourinary: Negative for dysuria, frequency and hematuria. Musculoskeletal: Negative for joint swelling, myalgias and neck stiffness. Skin: Negative for color change and rash. Neurological: Negative for seizures, speech difficulty, light-headedness and numbness. Hematological: Negative for adenopathy. Does not bruise/bleed easily. OBJECTIVE:    Physical Exam  Vitals signs reviewed. Constitutional:       General: She is not in acute distress. Appearance: She is well-developed. She is not diaphoretic. HENT:      Head: Normocephalic and atraumatic. Eyes:      General: No scleral icterus. Conjunctiva/sclera: Conjunctivae normal.      Pupils: Pupils are equal, round, and reactive to light. Neck:      Musculoskeletal: Normal range of motion. Thyroid: No thyromegaly. Vascular: No JVD. Trachea: No tracheal deviation. Cardiovascular:      Rate and Rhythm: Normal rate and regular rhythm. Heart sounds: Normal heart sounds. No murmur. No friction rub. No gallop. Pulmonary:      Effort: Pulmonary effort is normal. No respiratory distress. Breath sounds: No stridor. No wheezing or rales. Chest:      Chest wall: No tenderness. Abdominal:      General: Bowel sounds are normal. There is no distension. Palpations: Abdomen is soft. There is no mass. Tenderness: There is no abdominal tenderness. There is no guarding or rebound. Musculoskeletal: Normal range of motion. General: No tenderness. Lymphadenopathy:      Cervical: No cervical adenopathy. Skin:     General: Skin is warm and dry. Coloration: Skin is not pale. Findings: No erythema or rash. Neurological:      Mental Status: She is alert and oriented to person, place, and time. Cranial Nerves: No cranial nerve deficit. Coordination: Coordination normal.   Psychiatric:         Behavior: Behavior normal.         Thought Content: Thought content normal.         Judgment: Judgment normal.         54 BMI down to 47 Kg/m2. .     Assessment / Plan:    1. Morbid obesity with BMI of 45.0-49.9, adult ([facility] [address]    2. Fibromyalgia    3. Essential hypertension    4. Current mild episode of major depressive disorder without prior episode ([facility] [address]    5. Arthritis        Needs the RYGB, will proceed. Discussed again the PCP issue, and asked for Dr Edu Parsons, I did. Follow Up:  Return in about 4 weeks (around 1/6/2021) for Bariatric follow up: diet, exercise & weight loss, For imaging and tests results review.     Seun Rodriguez MD, FACS, FICS  Member of the [id number] of [employer]    [phone]    12/9/20

## 2020-12-10 ENCOUNTER — TELEPHONE (OUTPATIENT)
Dept: CARDIOLOGY CLINIC | Age: 50
End: 2020-12-10

## 2020-12-14 ENCOUNTER — TELEPHONE (OUTPATIENT)
Dept: CARDIOLOGY CLINIC | Age: 50
End: 2020-12-14

## 2020-12-16 ENCOUNTER — TELEPHONE (OUTPATIENT)
Dept: CARDIOLOGY CLINIC | Age: 50
End: 2020-12-16

## 2020-12-28 ENCOUNTER — TELEPHONE (OUTPATIENT)
Dept: BARIATRICS/WEIGHT MGMT | Age: 50
End: 2020-12-28

## 2021-01-10 ENCOUNTER — APPOINTMENT (OUTPATIENT)
Dept: GENERAL RADIOLOGY | Age: 51
DRG: 392 | End: 2021-01-10
Payer: COMMERCIAL

## 2021-01-10 ENCOUNTER — HOSPITAL ENCOUNTER (INPATIENT)
Age: 51
LOS: 3 days | Discharge: HOME | DRG: 392 | End: 2021-01-13
Attending: FAMILY MEDICINE | Admitting: FAMILY MEDICINE
Payer: COMMERCIAL

## 2021-01-10 ENCOUNTER — APPOINTMENT (OUTPATIENT)
Dept: CT IMAGING | Age: 51
DRG: 392 | End: 2021-01-10
Payer: COMMERCIAL

## 2021-01-10 DIAGNOSIS — R10.84 GENERALIZED ABDOMINAL PAIN: ICD-10-CM

## 2021-01-10 DIAGNOSIS — R11.2 INTRACTABLE VOMITING WITH NAUSEA, UNSPECIFIED VOMITING TYPE: Primary | ICD-10-CM

## 2021-01-10 LAB
ALBUMIN SERPL-MCNC: 3.7 GM/DL (ref 3.4–5)
ALP BLD-CCNC: 88 IU/L (ref 40–129)
ALT SERPL-CCNC: 14 U/L (ref 10–40)
ANION GAP SERPL CALCULATED.3IONS-SCNC: 10 MMOL/L (ref 4–16)
AST SERPL-CCNC: 19 IU/L (ref 15–37)
BACTERIA: NEGATIVE /HPF
BASOPHILS ABSOLUTE: 0 K/CU MM
BASOPHILS RELATIVE PERCENT: 0.8 % (ref 0–1)
BILIRUB SERPL-MCNC: 0.2 MG/DL (ref 0–1)
BILIRUBIN URINE: NEGATIVE MG/DL
BLOOD, URINE: NEGATIVE
BUN BLDV-MCNC: 13 MG/DL (ref 6–23)
CALCIUM SERPL-MCNC: 9.4 MG/DL (ref 8.3–10.6)
CHLORIDE BLD-SCNC: 107 MMOL/L (ref 99–110)
CLARITY: CLEAR
CO2: 20 MMOL/L (ref 21–32)
COLOR: ABNORMAL
CREAT SERPL-MCNC: 0.7 MG/DL (ref 0.6–1.1)
DIFFERENTIAL TYPE: ABNORMAL
EOSINOPHILS ABSOLUTE: 0.2 K/CU MM
EOSINOPHILS RELATIVE PERCENT: 5 % (ref 0–3)
GFR AFRICAN AMERICAN: >60 ML/MIN/1.73M2
GFR NON-AFRICAN AMERICAN: >60 ML/MIN/1.73M2
GLUCOSE BLD-MCNC: 91 MG/DL (ref 70–99)
GLUCOSE, URINE: NEGATIVE MG/DL
HCT VFR BLD CALC: 33 % (ref 37–47)
HEMOGLOBIN: 10.1 GM/DL (ref 12.5–16)
IMMATURE NEUTROPHIL %: 0 % (ref 0–0.43)
KETONES, URINE: NEGATIVE MG/DL
LACTATE: 0.7 MMOL/L (ref 0.4–2)
LEUKOCYTE ESTERASE, URINE: NEGATIVE
LIPASE: 17 IU/L (ref 13–60)
LYMPHOCYTES ABSOLUTE: 1.6 K/CU MM
LYMPHOCYTES RELATIVE PERCENT: 42.9 % (ref 24–44)
MAGNESIUM: 2.2 MG/DL (ref 1.8–2.4)
MCH RBC QN AUTO: 25.6 PG (ref 27–31)
MCHC RBC AUTO-ENTMCNC: 30.6 % (ref 32–36)
MCV RBC AUTO: 83.5 FL (ref 78–100)
MONOCYTES ABSOLUTE: 0.2 K/CU MM
MONOCYTES RELATIVE PERCENT: 6.3 % (ref 0–4)
MUCUS: ABNORMAL HPF
NITRITE URINE, QUANTITATIVE: NEGATIVE
NUCLEATED RBC %: 0 %
PDW BLD-RTO: 14.5 % (ref 11.7–14.9)
PH, URINE: 7 (ref 5–8)
PLATELET # BLD: 210 K/CU MM (ref 140–440)
PMV BLD AUTO: 10.5 FL (ref 7.5–11.1)
POTASSIUM SERPL-SCNC: 4 MMOL/L (ref 3.5–5.1)
PROTEIN UA: NEGATIVE MG/DL
RBC # BLD: 3.95 M/CU MM (ref 4.2–5.4)
RBC URINE: <1 /HPF (ref 0–6)
SEGMENTED NEUTROPHILS ABSOLUTE COUNT: 1.7 K/CU MM
SEGMENTED NEUTROPHILS RELATIVE PERCENT: 45 % (ref 36–66)
SODIUM BLD-SCNC: 137 MMOL/L (ref 135–145)
SPECIFIC GRAVITY UA: 1.02 (ref 1–1.03)
SQUAMOUS EPITHELIAL: 2 /HPF
TOTAL CK: 67 IU/L (ref 26–140)
TOTAL IMMATURE NEUTOROPHIL: 0 K/CU MM
TOTAL NUCLEATED RBC: 0 K/CU MM
TOTAL PROTEIN: 6.7 GM/DL (ref 6.4–8.2)
TRICHOMONAS: ABNORMAL /HPF
TROPONIN T: <0.01 NG/ML
UROBILINOGEN, URINE: NORMAL MG/DL (ref 0.2–1)
WBC # BLD: 3.8 K/CU MM (ref 4–10.5)
WBC UA: <1 /HPF (ref 0–5)

## 2021-01-10 PROCEDURE — 96372 THER/PROPH/DIAG INJ SC/IM: CPT

## 2021-01-10 PROCEDURE — 6360000002 HC RX W HCPCS: Performed by: INTERNAL MEDICINE

## 2021-01-10 PROCEDURE — 6360000002 HC RX W HCPCS: Performed by: EMERGENCY MEDICINE

## 2021-01-10 PROCEDURE — 82550 ASSAY OF CK (CPK): CPT

## 2021-01-10 PROCEDURE — 93005 ELECTROCARDIOGRAM TRACING: CPT | Performed by: PHYSICIAN ASSISTANT

## 2021-01-10 PROCEDURE — 83690 ASSAY OF LIPASE: CPT

## 2021-01-10 PROCEDURE — 2580000003 HC RX 258: Performed by: INTERNAL MEDICINE

## 2021-01-10 PROCEDURE — 96375 TX/PRO/DX INJ NEW DRUG ADDON: CPT

## 2021-01-10 PROCEDURE — 85025 COMPLETE CBC W/AUTO DIFF WBC: CPT

## 2021-01-10 PROCEDURE — 80076 HEPATIC FUNCTION PANEL: CPT

## 2021-01-10 PROCEDURE — 84484 ASSAY OF TROPONIN QUANT: CPT

## 2021-01-10 PROCEDURE — 74177 CT ABD & PELVIS W/CONTRAST: CPT

## 2021-01-10 PROCEDURE — 80048 BASIC METABOLIC PNL TOTAL CA: CPT

## 2021-01-10 PROCEDURE — 83605 ASSAY OF LACTIC ACID: CPT

## 2021-01-10 PROCEDURE — 71045 X-RAY EXAM CHEST 1 VIEW: CPT

## 2021-01-10 PROCEDURE — 96376 TX/PRO/DX INJ SAME DRUG ADON: CPT

## 2021-01-10 PROCEDURE — 99285 EMERGENCY DEPT VISIT HI MDM: CPT

## 2021-01-10 PROCEDURE — 36415 COLL VENOUS BLD VENIPUNCTURE: CPT

## 2021-01-10 PROCEDURE — C9113 INJ PANTOPRAZOLE SODIUM, VIA: HCPCS | Performed by: INTERNAL MEDICINE

## 2021-01-10 PROCEDURE — 83735 ASSAY OF MAGNESIUM: CPT

## 2021-01-10 PROCEDURE — 96374 THER/PROPH/DIAG INJ IV PUSH: CPT

## 2021-01-10 PROCEDURE — 2580000003 HC RX 258: Performed by: PHYSICIAN ASSISTANT

## 2021-01-10 PROCEDURE — 6360000002 HC RX W HCPCS: Performed by: PHYSICIAN ASSISTANT

## 2021-01-10 PROCEDURE — 81001 URINALYSIS AUTO W/SCOPE: CPT

## 2021-01-10 PROCEDURE — 1200000000 HC SEMI PRIVATE

## 2021-01-10 PROCEDURE — 6360000004 HC RX CONTRAST MEDICATION: Performed by: PHYSICIAN ASSISTANT

## 2021-01-10 PROCEDURE — 6370000000 HC RX 637 (ALT 250 FOR IP): Performed by: INTERNAL MEDICINE

## 2021-01-10 PROCEDURE — 80053 COMPREHEN METABOLIC PANEL: CPT

## 2021-01-10 RX ORDER — ACETAMINOPHEN 650 MG/1
650 SUPPOSITORY RECTAL EVERY 6 HOURS PRN
Status: DISCONTINUED | OUTPATIENT
Start: 2021-01-10 | End: 2021-01-14 | Stop reason: HOSPADM

## 2021-01-10 RX ORDER — SODIUM CHLORIDE 9 MG/ML
INJECTION, SOLUTION INTRAVENOUS CONTINUOUS
Status: DISCONTINUED | OUTPATIENT
Start: 2021-01-10 | End: 2021-01-14 | Stop reason: HOSPADM

## 2021-01-10 RX ORDER — SODIUM CHLORIDE 0.9 % (FLUSH) 0.9 %
10 SYRINGE (ML) INJECTION PRN
Status: DISCONTINUED | OUTPATIENT
Start: 2021-01-10 | End: 2021-01-14 | Stop reason: HOSPADM

## 2021-01-10 RX ORDER — ATENOLOL 25 MG/1
25 TABLET ORAL DAILY
Status: DISCONTINUED | OUTPATIENT
Start: 2021-01-10 | End: 2021-01-14 | Stop reason: HOSPADM

## 2021-01-10 RX ORDER — LEVETIRACETAM 500 MG/1
1000 TABLET ORAL 2 TIMES DAILY
Status: DISCONTINUED | OUTPATIENT
Start: 2021-01-11 | End: 2021-01-10 | Stop reason: ALTCHOICE

## 2021-01-10 RX ORDER — POLYETHYLENE GLYCOL 3350 17 G/17G
17 POWDER, FOR SOLUTION ORAL DAILY PRN
Status: DISCONTINUED | OUTPATIENT
Start: 2021-01-10 | End: 2021-01-14 | Stop reason: HOSPADM

## 2021-01-10 RX ORDER — 0.9 % SODIUM CHLORIDE 0.9 %
1000 INTRAVENOUS SOLUTION INTRAVENOUS ONCE
Status: COMPLETED | OUTPATIENT
Start: 2021-01-10 | End: 2021-01-10

## 2021-01-10 RX ORDER — PANTOPRAZOLE SODIUM 40 MG/10ML
40 INJECTION, POWDER, LYOPHILIZED, FOR SOLUTION INTRAVENOUS 2 TIMES DAILY
Status: DISCONTINUED | OUTPATIENT
Start: 2021-01-10 | End: 2021-01-14 | Stop reason: HOSPADM

## 2021-01-10 RX ORDER — ONDANSETRON 2 MG/ML
4 INJECTION INTRAMUSCULAR; INTRAVENOUS ONCE
Status: COMPLETED | OUTPATIENT
Start: 2021-01-10 | End: 2021-01-10

## 2021-01-10 RX ORDER — GABAPENTIN 400 MG/1
800 CAPSULE ORAL 3 TIMES DAILY
Status: DISCONTINUED | OUTPATIENT
Start: 2021-01-10 | End: 2021-01-14 | Stop reason: HOSPADM

## 2021-01-10 RX ORDER — PROMETHAZINE HYDROCHLORIDE 25 MG/ML
25 INJECTION, SOLUTION INTRAMUSCULAR; INTRAVENOUS ONCE
Status: COMPLETED | OUTPATIENT
Start: 2021-01-10 | End: 2021-01-10

## 2021-01-10 RX ORDER — HYDROMORPHONE HCL 110MG/55ML
0.25 PATIENT CONTROLLED ANALGESIA SYRINGE INTRAVENOUS
Status: DISCONTINUED | OUTPATIENT
Start: 2021-01-10 | End: 2021-01-12

## 2021-01-10 RX ORDER — ALBUTEROL SULFATE 2.5 MG/3ML
2.5 SOLUTION RESPIRATORY (INHALATION) EVERY 6 HOURS PRN
Status: DISCONTINUED | OUTPATIENT
Start: 2021-01-10 | End: 2021-01-14 | Stop reason: HOSPADM

## 2021-01-10 RX ORDER — MORPHINE SULFATE 4 MG/ML
4 INJECTION, SOLUTION INTRAMUSCULAR; INTRAVENOUS ONCE
Status: COMPLETED | OUTPATIENT
Start: 2021-01-10 | End: 2021-01-10

## 2021-01-10 RX ORDER — LEVOTHYROXINE SODIUM 0.1 MG/1
200 TABLET ORAL NIGHTLY
Status: DISCONTINUED | OUTPATIENT
Start: 2021-01-10 | End: 2021-01-14 | Stop reason: HOSPADM

## 2021-01-10 RX ORDER — ACETAMINOPHEN 325 MG/1
650 TABLET ORAL EVERY 6 HOURS PRN
Status: DISCONTINUED | OUTPATIENT
Start: 2021-01-10 | End: 2021-01-14 | Stop reason: HOSPADM

## 2021-01-10 RX ORDER — ONDANSETRON 2 MG/ML
4 INJECTION INTRAMUSCULAR; INTRAVENOUS EVERY 6 HOURS PRN
Status: DISCONTINUED | OUTPATIENT
Start: 2021-01-10 | End: 2021-01-14 | Stop reason: HOSPADM

## 2021-01-10 RX ORDER — PAROXETINE HYDROCHLORIDE 20 MG/1
20 TABLET, FILM COATED ORAL NIGHTLY
Status: DISCONTINUED | OUTPATIENT
Start: 2021-01-11 | End: 2021-01-14 | Stop reason: HOSPADM

## 2021-01-10 RX ORDER — SODIUM CHLORIDE 0.9 % (FLUSH) 0.9 %
10 SYRINGE (ML) INJECTION EVERY 12 HOURS SCHEDULED
Status: DISCONTINUED | OUTPATIENT
Start: 2021-01-10 | End: 2021-01-14 | Stop reason: HOSPADM

## 2021-01-10 RX ORDER — MULTIVIT-MIN/FERROUS GLUCONATE 9 MG/15 ML
15 LIQUID (ML) ORAL DAILY
Status: DISCONTINUED | OUTPATIENT
Start: 2021-01-11 | End: 2021-01-14 | Stop reason: HOSPADM

## 2021-01-10 RX ORDER — FERROUS SULFATE 325(65) MG
325 TABLET ORAL
Status: DISCONTINUED | OUTPATIENT
Start: 2021-01-11 | End: 2021-01-14 | Stop reason: HOSPADM

## 2021-01-10 RX ORDER — ESTRADIOL 1 MG/1
0.5 TABLET ORAL DAILY
Status: DISCONTINUED | OUTPATIENT
Start: 2021-01-11 | End: 2021-01-14 | Stop reason: HOSPADM

## 2021-01-10 RX ORDER — HYDROMORPHONE HCL 110MG/55ML
1 PATIENT CONTROLLED ANALGESIA SYRINGE INTRAVENOUS EVERY 30 MIN PRN
Status: DISCONTINUED | OUTPATIENT
Start: 2021-01-10 | End: 2021-01-10 | Stop reason: ALTCHOICE

## 2021-01-10 RX ORDER — PROMETHAZINE HYDROCHLORIDE 25 MG/1
12.5 TABLET ORAL EVERY 6 HOURS PRN
Status: DISCONTINUED | OUTPATIENT
Start: 2021-01-10 | End: 2021-01-14 | Stop reason: HOSPADM

## 2021-01-10 RX ORDER — HYDROMORPHONE HCL 110MG/55ML
0.5 PATIENT CONTROLLED ANALGESIA SYRINGE INTRAVENOUS
Status: DISCONTINUED | OUTPATIENT
Start: 2021-01-10 | End: 2021-01-12

## 2021-01-10 RX ORDER — OXYCODONE HYDROCHLORIDE AND ACETAMINOPHEN 5; 325 MG/1; MG/1
1 TABLET ORAL EVERY 6 HOURS PRN
Status: DISCONTINUED | OUTPATIENT
Start: 2021-01-10 | End: 2021-01-14 | Stop reason: HOSPADM

## 2021-01-10 RX ADMIN — LEVETIRACETAM 1000 MG: 100 INJECTION, SOLUTION, CONCENTRATE INTRAVENOUS at 23:32

## 2021-01-10 RX ADMIN — OXYCODONE HYDROCHLORIDE AND ACETAMINOPHEN 1 TABLET: 5; 325 TABLET ORAL at 20:25

## 2021-01-10 RX ADMIN — ATENOLOL 25 MG: 25 TABLET ORAL at 20:24

## 2021-01-10 RX ADMIN — HYDROMORPHONE HYDROCHLORIDE 1 MG: 2 INJECTION INTRAMUSCULAR; INTRAVENOUS; SUBCUTANEOUS at 09:19

## 2021-01-10 RX ADMIN — MORPHINE SULFATE 4 MG: 4 INJECTION, SOLUTION INTRAMUSCULAR; INTRAVENOUS at 08:03

## 2021-01-10 RX ADMIN — PROMETHAZINE HYDROCHLORIDE 25 MG: 25 INJECTION INTRAMUSCULAR; INTRAVENOUS at 09:19

## 2021-01-10 RX ADMIN — HYDROMORPHONE HYDROCHLORIDE 1 MG: 2 INJECTION INTRAMUSCULAR; INTRAVENOUS; SUBCUTANEOUS at 14:18

## 2021-01-10 RX ADMIN — ONDANSETRON 4 MG: 2 INJECTION INTRAMUSCULAR; INTRAVENOUS at 18:27

## 2021-01-10 RX ADMIN — SODIUM CHLORIDE: 9 INJECTION, SOLUTION INTRAVENOUS at 20:31

## 2021-01-10 RX ADMIN — PANTOPRAZOLE SODIUM 40 MG: 40 INJECTION, POWDER, FOR SOLUTION INTRAVENOUS at 20:23

## 2021-01-10 RX ADMIN — HYDROMORPHONE HYDROCHLORIDE 1 MG: 2 INJECTION INTRAMUSCULAR; INTRAVENOUS; SUBCUTANEOUS at 11:17

## 2021-01-10 RX ADMIN — PROMETHAZINE HYDROCHLORIDE 12.5 MG: 25 TABLET ORAL at 20:25

## 2021-01-10 RX ADMIN — ENOXAPARIN SODIUM 40 MG: 40 INJECTION SUBCUTANEOUS at 20:24

## 2021-01-10 RX ADMIN — IOPAMIDOL 80 ML: 755 INJECTION, SOLUTION INTRAVENOUS at 10:41

## 2021-01-10 RX ADMIN — HYDROMORPHONE HYDROCHLORIDE 0.25 MG: 2 INJECTION INTRAMUSCULAR; INTRAVENOUS; SUBCUTANEOUS at 22:51

## 2021-01-10 RX ADMIN — SODIUM CHLORIDE 1000 ML: 9 INJECTION, SOLUTION INTRAVENOUS at 08:24

## 2021-01-10 RX ADMIN — HYDROMORPHONE HYDROCHLORIDE 0.5 MG: 2 INJECTION INTRAMUSCULAR; INTRAVENOUS; SUBCUTANEOUS at 18:27

## 2021-01-10 RX ADMIN — ONDANSETRON 4 MG: 2 INJECTION INTRAMUSCULAR; INTRAVENOUS at 08:23

## 2021-01-10 ASSESSMENT — PAIN SCALES - GENERAL
PAINLEVEL_OUTOF10: 7
PAINLEVEL_OUTOF10: 0
PAINLEVEL_OUTOF10: 10
PAINLEVEL_OUTOF10: 0
PAINLEVEL_OUTOF10: 7
PAINLEVEL_OUTOF10: 10
PAINLEVEL_OUTOF10: 10
PAINLEVEL_OUTOF10: 8

## 2021-01-10 ASSESSMENT — PAIN DESCRIPTION - PAIN TYPE: TYPE: ACUTE PAIN

## 2021-01-10 ASSESSMENT — PAIN DESCRIPTION - DESCRIPTORS: DESCRIPTORS: ACHING

## 2021-01-10 ASSESSMENT — PAIN DESCRIPTION - PROGRESSION: CLINICAL_PROGRESSION: NOT CHANGED

## 2021-01-10 ASSESSMENT — PAIN DESCRIPTION - ONSET: ONSET: ON-GOING

## 2021-01-10 NOTE — ED PROVIDER NOTES
EKG is interpreted by me. EKG shows sinus rhythm at 65 bpm, no axis deviation, no remarkable ST segment elevation depressions, T waves are unremarkable, there is some artifact in the EKG, WA interval of 188, QRS of 92, QTc of 153. Final impression, nonspecific EKG.     Yayo Ruiz  1/10/2021  10:14 AM        Yayo Ruiz MD  01/10/21 1014

## 2021-01-10 NOTE — ED PROVIDER NOTES
eMERGENCY dEPARTMENT eNCOUnter         [facility]     PCP: Jolan Duane, MD    CHIEF COMPLAINT    Chief Complaint   Patient presents with    Abdominal Pain     RUQ pain into back, hx of pancreatitis       HPI    Belgica Pratt is a 44 y.o. female who presents with acute on chronic epigastric and right upper quadrant abdominal pain, nausea vomiting and diarrhea. Onset was prior travel, x1 week ago. Context is patient has a history of sleeve gastrectomy, cholecystectomy, appendectomy, hysterectomy, lupus, GERD as well as chronic pancreatitis secondary to gallstone pancreatitis. Denies any history of alcohol abuse. Patient is on chronic pain management with Percocet but has had gradual onset and worsening of epigastric abdominal pain radiating to the right flank area, very similar to when her pancreatitis flares up. Has had multiple episodes of nausea and nonbilious/nonbloody vomiting without improvement with home Phenergan. Has also had loose dark brown stools, no black tarry stools or bright red blood per rectum. Denying any urinary complaints. Does state pain intermittently does cause pressure into the mid retrosternal area but does not worsen with deep inspiration or with activity. Does not radiate to the back and no tearing ripping sensation or known history of TAA/AAA. Has been seen by Dr. Rebecca Stanley who has assessed for possible Jet-en-Y gastric bypass in the future given her complications from sleeve gastrectomy. Denying any fever chills, cough or hemoptysis. REVIEW OF SYSTEMS    Constitutional:  Denies fever, chills, weight loss or weakness   HENT:  Denies sore throat or ear pain   Cardiovascular:  See HPI  Respiratory:  Denies cough or shortness of breath   GI:  See HPI above  : No hematuria or dysuria. Musculoskeletal:  Denies groin pain or masses. No pain or swelling of extremities.   Skin:  Denies rash  Neurologic:  Denies headache, focal weakness or sensory changes   Endocrine:  Denies polyuria or polydypsia   Lymphatic:  Denies swollen glands     All other review of systems are negative  See HPI and nursing notes for additional information     PAST MEDICAL & SURGICAL HISTORY    Past Medical History:   Diagnosis Date    Acid reflux     Anemia     Anxiety     Arthritis     Hands, Back And Ankles    Bleeding ulcer 2014    \"I Had Ulcers In My Stomach And Colon\"/ per pt on 8/12/2019\"they said recently having some blood in my stomach- in July ( 2019)could not find where coming from \"    Bronchitis Last Episode 2014    Chronic back pain     [facility]     [facility]    COPD (chronic obstructive pulmonary disease) ([facility] [address]     Se[facility] Dr. [facility] Depression     Fibromyalgia Dx 2013    H/O echocardiogram 8/11/15    EF >55%. LA to be at the upper limit of normal in size. LV hypertrophy with normal LV systolic, but abnormal diastolic function. Normal valvular structures and function.  H/O echocardiogram 08/30/2018    EF 55-60%    Hiatal hernia     History of blood transfusion 09/2015 And 2018    No Reaction To Blood Transfusions Received    [place] (hard of hearing)     Right Ear    Hx of cardiac catheterization 10/24/2010    Angiographically normal coronary arteries w/ normal LV function and wall motion.  Hx of transesophageal echocardiography (PILO) for monitoring 12/2/2010    EF 55-60%. WNL.    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 name   Satish Jarrett cysts     \"they found that I have a kidney cyst but not sure which side\" per pt on 7/15/2020    Lupus ([facility] [address] Dx 2013    \"Rheumatoid Lupus\"    MDRO (multiple drug resistant organisms) resistance     4 months ago [facility]    Morbid obesity (HCC)     MRSA bacteremia     Nausea     \"Most Of The Time\"    Pain management     Sees Dr. David Pak, Once A Month    Pancreatitis chronic Dx 2001    Pneumonia Dx 11-15    Shortness of breath on exertion     Shortness of breath on exertion     Sleep apnea     Has CPAP\"no longer use the cpap since lost weight\"    Staph infection Dx 11-15    Left Foot    Staph infection Dx 11-15    \"Left Foot\"    Teeth missing     Upper And Lower    Thyroid disease     Wears glasses     To Read     Past Surgical History:   Procedure Laterality Date    APPENDECTOMY  02/1998    Done When Tubes And Ovaries Were Removed    CARDIAC CATHETERIZATION  10/24/2010    CATHETER REMOVAL N/A 7/16/2019    PORT REMOVAL performed by Deep Alejandre MD at [address] [facility]  08/27/1991    CHOLECYSTECTOMY, LAPAROSCOPIC  1990's    COLONOSCOPY  Last Done In 2000's    DENTAL SURGERY      Teeth Extracted In Past    ENDOSCOPY, COLON, DIAGNOSTIC  Last Done In 2018    FOOT DEBRIDEMENT Left 6/16/2019    FIRST METATARSAL DEBRIDEMENT INCISION AND DRAINAGE. EXCISION OF ULCER.  RESECTION OF BONE. 1ST METATARSAL POWER LAVAGE AND BONE CEMENT performed by Sonia Palmer DPM at [address] [facility] Left Last Done In 2016     Dr. Goldie Barnes, \" About 6 Surgeries Done Because Of Staph Infection\"    HIATAL HERNIA REPAIR N/A 2/12/2019    HERNIA HIATAL LAPAROSCOPIC ROBOTIC performed by Deep Alejandre MD at [facility]  10/1997    Partial Abdominal Hysterectomy    INSERTION / REMOVAL / REPLACEMENT VENOUS ACCESS CATHETER N/A 8/15/2019    PORT INSERTION performed by Deep Alejandre MD at [address] [facility] [place]    removed after 6 months    LUNG REMOVAL, PARTIAL Left 2008    Benign    OTHER SURGICAL HISTORY  02/1998    \"Tubes And Ovaries Removed, Appendectomy Also Done\"    OTHER SURGICAL HISTORY  Last Done 7-15-16    [facility] Insertion \"Total Of Six Done, Removed Last Mediport In 2014\"    PORT SURGERY N/A 1/15/2020    PORT REMOVAL performed by Luca Sanderson MD at [facility] N/A 7/6/2020    PORT INSERTION performed by Luca Sanderson MD at [address] [facility] N/A 2/12/2019    GASTRECTOMY SLEEVE LAPAROSCOPIC ROBOTIC performed by Luca Sanderson MD at [facility]  08/27/2018    UPPER GASTROINTESTINAL ENDOSCOPY N/A 4/2/2019    EGD CONTROL HEMORRHAGE with epi injection at bleeding site performed by Luca Sanderson MD at [address] [place] [address] 6/19/2019    EGD DIAGNOSTIC ONLY performed by Luca Sanderson MD at [address] [place] [address] 7/22/2019    EGD DIAGNOSTIC ONLY performed by Doron Canales MD at [address] [place] [address] N/A 10/14/2019    EGD DIAGNOSTIC ONLY performed by Luca Sanderson MD at [address] [place] [address] N/A 7/17/2020    EGJ DILATATION BALLOON WITH 18-20 MM BALLOON, DILATED TO 20 MM. performed by Luca Sanderson MD at [address] [place] [address]    Current Outpatient Rx   Medication Sig Dispense Refill    ferrous sulfate (IRON 325) 325 (65 Fe) MG tablet Take 1 tablet by mouth daily (with breakfast) 90 tablet 5    sucralfate (CARAFATE) 1 GM tablet Take 1 tablet by mouth every 12 hours 120 tablet 3    albuterol sulfate  (90 Base) MCG/ACT inhaler       ondansetron (ZOFRAN ODT) 4 MG disintegrating tablet Take 1 tablet by mouth every 8 hours as needed for Nausea or Vomiting 30 tablet 5    levETIRAcetam (KEPPRA) 1000 MG tablet Take 1 tablet by mouth 2 times daily 60 tablet 5    pantoprazole (PROTONIX) 40 MG tablet Take 1 tablet by mouth every morning (before breakfast) 90 tablet 1    dicyclomine (BENTYL) 10 MG capsule Take 1 capsule by mouth 3 times daily as needed (abdominal pain) 21 capsule 3    oxyCODONE-acetaminophen (PERCOCET) 5-325 MG per tablet Take 1 tablet by mouth.  Every 4-6 hours PRN      Cholecalciferol (VITAMIN D3) 5000 units TABS Take 1 tablet by mouth daily      estradiol (ESTRACE) 0.5 MG tablet Take 0.5 mg by mouth daily      atenolol (TENORMIN) 25 MG tablet Take 25 mg by mouth daily      Multiple Vitamin (MVI, BARIATRIC ADVANTAGE MULTI-FORMULA, CHEW TAB) Take 1 tablet by mouth daily 30 tablet 5    Calcium Citrate-Vitamin D (CALCIUM + VIT D, BARIATRIC ADVANTAGE, CHEWABLE TABLET) Take 1 tablet by mouth 2 times daily 120 tablet 5    levothyroxine (SYNTHROID) 125 MCG tablet Take 1 tablet by mouth Daily (Patient taking differently: Take 125 mcg by mouth nightly ) 30 tablet 0    gabapentin (NEURONTIN) 800 MG tablet Take 800 mg by mouth 3 times daily      PARoxetine (PAXIL) 30 MG tablet Take 50 mg by mouth nightly          ALLERGIES    Allergies   Allergen Reactions    Aspirin Palpitations     \"My Heart Rate Elevates\"    Shellfish-Derived Products Swelling    Toradol [Ketorolac Tromethamine] Rash    Compazine [Prochlorperazine]     Reglan [Metoclopramide] Itching       SOCIAL AND FAMILY HISTORY    Social History     Socioeconomic History    Marital status:      Spouse name: Not on file    Number of children: Not on file    Years of education: Not on file    Highest education level: Not on file   Occupational History    Not on file   Social Needs    Financial resource strain: Not on file    Food insecurity     Worry: Not on file     Inability: Not on file    Transportation needs     Medical: Not on file     Non-medical: Not on file   Tobacco Use    Smoking status: Never Smoker    Smokeless tobacco: Never Used   Substance and Sexual Activity    Alcohol use: No     Alcohol/week: 0.0 standard drinks    Drug use: No    Sexual activity: Not Currently   Lifestyle    Physical activity     Days per week: Not on file     Minutes per session: Not on file    Stress: Not on file   Relationships    Social connections     Talks on phone: Not on file     Gets together: Not on file     Attends Nondenominational service: Not on file     Active member of club or organization: Not on file     Attends meetings of clubs or organizations: Not on file     Relationship status: Not on file    Intimate partner violence     Fear of current or ex partner: Not on file     Emotionally abused: Not on file     Physically abused: Not on file     Forced sexual activity: Not on file   Other Topics Concern    Not on file   Social History Narrative    Not on file     Family History   Problem Relation Age of Onset    Diabetes Mother         \"Borderline Diabetes\"    High Blood Pressure Mother     Obesity Mother     Arthritis Mother     Heart Disease Mother     High Cholesterol Mother     Vision Loss Mother     Diabetes Father     High Blood Pressure Father     Asthma Father     Cancer Father         prostate cancer    High Blood Pressure Brother     Asthma Son     Vision Loss Son     Lupus Daughter     Other Daughter         \"Alot Of Female Problems\"       PHYSICAL EXAM    VITAL SIGNS: BP (!) 148/80   Pulse 59   Temp 98 °F (36.7 °C) (Oral)   Resp 18   Ht 5' 4\" (1.626 m)   Wt 286 lb (129.7 kg)   SpO2 100%   BMI 49.09 kg/m²   General:  Well developed, obese female, moderate acute pain, tearful, nontoxic   Eyes:  Sclera nonicteric, Conjunctiva moist, No discharge  Head:  Normocephalic, Atramautic  Neck/Lymphatics: Supple, no JVD, no swollen nodes  Respiratory:  Clear to ausculation bilaterally, No retractions, Non labored breathing  Cardiovascular:  RRR, No murmurs/gallops/thrills  GI:   No gross discoloration. Bowel sounds present in all quadrants, No audible bruits. Soft,  Nondistended. Moderate epigastric right upper quadrant abdominal tenderness to palpation with mild guarding. No rebound tenderness. No palpable pulsatile masses or obvious hernias.   Difficult abdominal exam secondary to body habitus. Back:  No CVA tenderness to percussion.   Musculoskeletal:  No edema, No deformity  Peripheral Vascular: Distal pulses 2+ equal bilaterally  Integument: No rash, Normal turgor  Neurologic:  Alert & oriented, Normal speech  Psychiatric: Cooperative, pleasant affect       I have reviewed and interpreted all of the currently available lab results from this visit (if applicable):  Results for orders placed or performed during the hospital encounter of 01/10/21   CBC Auto Differential   Result Value Ref Range    WBC 3.8 (L) 4.0 - 10.5 K/CU MM    RBC 3.95 (L) 4.2 - 5.4 M/CU MM    Hemoglobin 10.1 (L) 12.5 - 16.0 GM/DL    Hematocrit 33.0 (L) 37 - 47 %    MCV 83.5 78 - 100 FL    MCH 25.6 (L) 27 - 31 PG    MCHC 30.6 (L) 32.0 - 36.0 %    RDW 14.5 11.7 - 14.9 %    Platelets 085 458 - 423 K/CU MM    MPV 10.5 7.5 - 11.1 FL    Differential Type AUTOMATED DIFFERENTIAL     Segs Relative 45.0 36 - 66 %    Lymphocytes % 42.9 24 - 44 %    Monocytes % 6.3 (H) 0 - 4 %    Eosinophils % 5.0 (H) 0 - 3 %    Basophils % 0.8 0 - 1 %    Segs Absolute 1.7 K/CU MM    Lymphocytes Absolute 1.6 K/CU MM    Monocytes Absolute 0.2 K/CU MM    Eosinophils Absolute 0.2 K/CU MM    Basophils Absolute 0.0 K/CU MM    Nucleated RBC % 0.0 %    Total Nucleated RBC 0.0 K/CU MM    Total Immature Neutrophil 0.00 K/CU MM    Immature Neutrophil % 0.0 0 - 0.43 %   Urinalysis with microscopic   Result Value Ref Range    Color, UA STRAW (A) YELLOW    Clarity, UA CLEAR CLEAR    Glucose, Urine NEGATIVE NEGATIVE MG/DL    Bilirubin Urine NEGATIVE NEGATIVE MG/DL    Ketones, Urine NEGATIVE NEGATIVE MG/DL    Specific Gravity, UA 1.016 1.001 - 1.035    Blood, Urine NEGATIVE NEGATIVE    pH, Urine 7.0 5.0 - 8.0    Protein, UA NEGATIVE NEGATIVE MG/DL    Urobilinogen, Urine NORMAL 0.2 - 1.0 MG/DL    Nitrite Urine, Quantitative NEGATIVE NEGATIVE    Leukocyte Esterase, Urine NEGATIVE NEGATIVE    RBC, UA <1 0 - 6 /HPF    WBC, UA <1 0 - 5 /HPF Bacteria, UA NEGATIVE NEGATIVE /HPF    Squam Epithel, UA 2 /HPF    Mucus, UA RARE (A) NEGATIVE HPF    Trichomonas, UA NONE SEEN NONE SEEN /HPF   Troponin   Result Value Ref Range    Troponin T <0.010 <0.01 NG/ML   CMP   Result Value Ref Range    Sodium 137 135 - 145 MMOL/L    Potassium 4.0 3.5 - 5.1 MMOL/L    Chloride 107 99 - 110 mMol/L    CO2 20 (L) 21 - 32 MMOL/L    BUN 13 6 - 23 MG/DL    CREATININE 0.7 0.6 - 1.1 MG/DL    Glucose 91 70 - 99 MG/DL    Calcium 9.4 8.3 - 10.6 MG/DL    Alb 3.7 3.4 - 5.0 GM/DL    Total Protein 6.7 6.4 - 8.2 GM/DL    Total Bilirubin 0.2 0.0 - 1.0 MG/DL    ALT 14 10 - 40 U/L    AST 19 15 - 37 IU/L    Alkaline Phosphatase 88 40 - 129 IU/L    GFR Non-African American >60 >60 mL/min/1.73m2    GFR African American >60 >60 mL/min/1.73m2    Anion Gap 10 4 - 16   Lactic Acid, Plasma   Result Value Ref Range    Lactate 0.7 0.4 - 2.0 mMOL/L   Magnesium   Result Value Ref Range    Magnesium 2.2 1.8 - 2.4 mg/dl   CK   Result Value Ref Range    Total CK 67 26 - 140 IU/L   Lipase   Result Value Ref Range    Lipase 17 13 - 60 IU/L   EKG 12 Lead   Result Value Ref Range    Ventricular Rate 65 BPM    Atrial Rate 65 BPM    P-R Interval 188 ms    QRS Duration 92 ms    Q-T Interval 436 ms    QTc Calculation (Bazett) 453 ms    P Axis 60 degrees    R Axis 3 degrees    T Axis 38 degrees    Diagnosis       Sinus rhythm with occasional premature ventricular complexes  Possible Left atrial enlargement  Low voltage QRS  Borderline ECG  When compared with ECG of 03-NOV-2020 14:35,  premature ventricular complexes are now present  Nonspecific T wave abnormality, improved in Anterolateral leads          RADIOLOGY/PROCEDURES        CT ABDOMEN PELVIS W IV CONTRAST Additional Contrast? None (Final result)  Result time 01/10/21 11:19:38  Final result by Maria Fernanda Chaves MD (01/10/21 11:19:38)                Impression:    1. No acute abnormalities seen in the abdomen or pelvis   2.  Unchanged pneumobilia status post cholecystectomy   3. No obstructive uropathy   4. Previous sleeve gastrectomy   5. No evidence for pancreatitis             Narrative:    EXAMINATION:   CT OF THE ABDOMEN AND PELVIS WITH CONTRAST 1/10/2021 10:38 am     TECHNIQUE:   CT of the abdomen and pelvis was performed with the administration of   intravenous contrast. Multiplanar reformatted images are provided for review. Dose modulation, iterative reconstruction, and/or weight based adjustment of   the mA/kV was utilized to reduce the radiation dose to as low as reasonably   achievable. COMPARISON:   11/03/2020     HISTORY:   ORDERING SYSTEM PROVIDED HISTORY: Epigastric pain radiating to the right   TECHNOLOGIST PROVIDED HISTORY:   Reason for exam:->Epigastric pain radiating to the right   Additional Contrast?->None   Reason for Exam: abdominal pain   Acuity: Acute   Type of Exam: Initial   Additional signs and symptoms: states \"feels like a flare up of pancreatitis\"   Relevant Medical/Surgical History: hx Chronic pancreatitis , appy, hyster,   norris     FINDINGS:   Lower Chest: No acute airspace disease.  No pleural or pericardial effusion     Organs: Stable pneumobilia.  Status post cholecystectomy.  The liver appears   normal.  The spleen, adrenal glands, pancreas and kidneys are unchanged   appearance. GI/Bowel: Previous gastric surgery.  The bowel loops are not dilated.  No   focal bowel wall thickening.  Large stool burden in the colon. Pelvis: No mass lesions.  No abnormal fluid collections.  No bladder or   ureteral stones. Peritoneum/Retroperitoneum: No adenopathy.  No extraluminal gas. Bones/Soft Tissues: No soft tissue hernia.  No acute fracture.  No lytic or   blastic lesion.                     XR CHEST PORTABLE (Preliminary result)  Result time 01/10/21 09:28:30  Preliminary result by Garret Schwab, MD (01/10/21 09:28:30)                Impression:    No acute cardiopulmonary abnormality identified. Narrative:    EXAMINATION:   ONE X-RAY VIEW OF THE CHEST     1/10/2021 8:57 am     COMPARISON:   11/03/2020     HISTORY:   ORDERING SYSTEM PROVIDED HISTORY:  Chest pain   TECHNOLOGIST PROVIDED HISTORY:   Reason for exam:   Chest pain     FINDINGS:   The heart is stable in size.  No pleural effusion or pneumothorax is seen. No focal lung consolidation identified.  Similar left basilar atelectasis   and/or scarring.  Similar appearance of the left-sided port catheter.                 Preliminary result by Albert Navarro MD (01/10/21 09:10:03)                Impression:    No acute cardiopulmonary abnormality identified. EKG Interpretation  Please see ED physician's note - Dr. Shantelle Montes - for EKG interpretation      ED [facility]      Vital signs and nursing notes reviewed during ED course. I have independently evaluated this patient . Supervising MD - Dr Shantelle Montes - present in the Emergency Department, available for consultation, throughout entirety of  patient care. All pertinent Lab data and radiographic results reviewed with patient at bedside. The patient and / or the family were informed of the results of any tests, a time was given to answer questions, a plan was proposed and they agreed with plan. Differential diagnosis: Abdominal Aortic Aneurysm, Ischemic Bowel, Bowel Obstruction, Acute Cholecystitis, Acute Appendicitis, other    Clinical  IMPRESSION    1. Intractable vomiting with nausea, unspecified vomiting type    2. Generalized abdominal pain          Patient presents with acute on chronic upper abdominal pain, nausea vomiting diarrhea. Work-up was initiated triage. On exam, obese female nontoxic afebrile but in moderate acute pain. She is tearful throughout exam.  Abdomen soft nondistended but difficult secondary to body habitus.   Is noted to have epigastric right upper quadrant abdominal tenderness, moderate with seen at bedside by me independently. However, in compliance with current hospital KEN/ED protocol, prior to admission I did discuss this patient case with emergency department physician, Dr. Yazmin Rodriguez, who did agree with ED workup/evaluation and plan for admission. Of note, this Pt was NOT admitted to the ICU. Comment: Please note this report has been produced using speech recognition software and may contain errors related to that system including errors in grammar, punctuation, and spelling, as well as words and phrases that may be inappropriate. If there are any questions or concerns please feel free to contact the dictating provider for clarification.           Lorenzo Hernandez PA-C  01/10/21 2408

## 2021-01-10 NOTE — ED PROVIDER NOTES
As the Remote Tele physician-in-triage, I performed a medical screening history and physical exam on this patient remotely via the . Niko Grossman is a 44 y.o. female with sleeve gastrectomy, gastroparesis, chronic right upper quadrant abdominal pain presents with epigastric abdominal pain radiating around to her right. She reports Hx of Gallstone pancreatitis; s/p cholecystectomy. She is s/p EGD with MRI and  MRCP on 9/25/2020 with no concerning findings. Hx of multiple admission for RUQ abdominal pain; last 10/17/20. Follows with Dr. Laurel Alvarenga of GI surgery plan is for RYGB in the future. PHYSICAL EXAM  LMP 01/09/2020     On exam, the patient appears in no acute distress. Speech is clear. Breathing is unlabored. Labs Reviewed   CBC WITH AUTO DIFFERENTIAL   BASIC METABOLIC PANEL W/ REFLEX TO MG FOR LOW K   HEPATIC FUNCTION PANEL   LIPASE   URINALYSIS WITH MICROSCOPIC     CT ABDOMEN PELVIS W IV CONTRAST Additional Contrast? None    (Results Pending)     Medications   ondansetron (ZOFRAN) injection 4 mg (has no administration in time range)   morphine sulfate (PF) injection 4 mg (has no administration in time range)         Comment: Please note this report has been produced using speech recognition software and may contain errors related to that system including errors in grammar, punctuation, and spelling, as well as words and phrases that may be inappropriate. If there are any questions or concerns please feel free to contact the dictating provider for clarification.        Frank Angeles MD  01/10/21 0979

## 2021-01-11 LAB
ALBUMIN SERPL-MCNC: 3.4 GM/DL (ref 3.4–5)
ALP BLD-CCNC: 80 IU/L (ref 40–129)
ALT SERPL-CCNC: 12 U/L (ref 10–40)
ANION GAP SERPL CALCULATED.3IONS-SCNC: 11 MMOL/L (ref 4–16)
AST SERPL-CCNC: 24 IU/L (ref 15–37)
BASOPHILS ABSOLUTE: 0 K/CU MM
BASOPHILS RELATIVE PERCENT: 0.6 % (ref 0–1)
BILIRUB SERPL-MCNC: 0.2 MG/DL (ref 0–1)
BUN BLDV-MCNC: 14 MG/DL (ref 6–23)
CALCIUM SERPL-MCNC: 8.4 MG/DL (ref 8.3–10.6)
CHLORIDE BLD-SCNC: 108 MMOL/L (ref 99–110)
CO2: 16 MMOL/L (ref 21–32)
CREAT SERPL-MCNC: 0.8 MG/DL (ref 0.6–1.1)
DIFFERENTIAL TYPE: ABNORMAL
EKG ATRIAL RATE: 65 BPM
EKG DIAGNOSIS: NORMAL
EKG P AXIS: 60 DEGREES
EKG P-R INTERVAL: 188 MS
EKG Q-T INTERVAL: 436 MS
EKG QRS DURATION: 92 MS
EKG QTC CALCULATION (BAZETT): 453 MS
EKG R AXIS: 3 DEGREES
EKG T AXIS: 38 DEGREES
EKG VENTRICULAR RATE: 65 BPM
EOSINOPHILS ABSOLUTE: 0.2 K/CU MM
EOSINOPHILS RELATIVE PERCENT: 4.2 % (ref 0–3)
GFR AFRICAN AMERICAN: >60 ML/MIN/1.73M2
GFR NON-AFRICAN AMERICAN: >60 ML/MIN/1.73M2
GLUCOSE BLD-MCNC: 92 MG/DL (ref 70–99)
HCT VFR BLD CALC: 32.2 % (ref 37–47)
HEMOGLOBIN: 9.6 GM/DL (ref 12.5–16)
IMMATURE NEUTROPHIL %: 0.3 % (ref 0–0.43)
LYMPHOCYTES ABSOLUTE: 1.5 K/CU MM
LYMPHOCYTES RELATIVE PERCENT: 40.4 % (ref 24–44)
MCH RBC QN AUTO: 26.4 PG (ref 27–31)
MCHC RBC AUTO-ENTMCNC: 29.8 % (ref 32–36)
MCV RBC AUTO: 88.7 FL (ref 78–100)
MONOCYTES ABSOLUTE: 0.3 K/CU MM
MONOCYTES RELATIVE PERCENT: 9.1 % (ref 0–4)
NUCLEATED RBC %: 0 %
PDW BLD-RTO: 14.5 % (ref 11.7–14.9)
PLATELET # BLD: 171 K/CU MM (ref 140–440)
PMV BLD AUTO: 10.2 FL (ref 7.5–11.1)
POTASSIUM SERPL-SCNC: 4.3 MMOL/L (ref 3.5–5.1)
RBC # BLD: 3.63 M/CU MM (ref 4.2–5.4)
SEGMENTED NEUTROPHILS ABSOLUTE COUNT: 1.6 K/CU MM
SEGMENTED NEUTROPHILS RELATIVE PERCENT: 45.4 % (ref 36–66)
SODIUM BLD-SCNC: 135 MMOL/L (ref 135–145)
TOTAL IMMATURE NEUTOROPHIL: 0.01 K/CU MM
TOTAL NUCLEATED RBC: 0 K/CU MM
TOTAL PROTEIN: 5.6 GM/DL (ref 6.4–8.2)
WBC # BLD: 3.6 K/CU MM (ref 4–10.5)

## 2021-01-11 PROCEDURE — 94761 N-INVAS EAR/PLS OXIMETRY MLT: CPT

## 2021-01-11 PROCEDURE — 85025 COMPLETE CBC W/AUTO DIFF WBC: CPT

## 2021-01-11 PROCEDURE — 96372 THER/PROPH/DIAG INJ SC/IM: CPT

## 2021-01-11 PROCEDURE — 80053 COMPREHEN METABOLIC PANEL: CPT

## 2021-01-11 PROCEDURE — 2580000003 HC RX 258: Performed by: INTERNAL MEDICINE

## 2021-01-11 PROCEDURE — 6360000002 HC RX W HCPCS: Performed by: INTERNAL MEDICINE

## 2021-01-11 PROCEDURE — C9113 INJ PANTOPRAZOLE SODIUM, VIA: HCPCS | Performed by: INTERNAL MEDICINE

## 2021-01-11 PROCEDURE — 93010 ELECTROCARDIOGRAM REPORT: CPT | Performed by: INTERNAL MEDICINE

## 2021-01-11 PROCEDURE — 36415 COLL VENOUS BLD VENIPUNCTURE: CPT

## 2021-01-11 PROCEDURE — 96376 TX/PRO/DX INJ SAME DRUG ADON: CPT

## 2021-01-11 PROCEDURE — 1200000000 HC SEMI PRIVATE

## 2021-01-11 PROCEDURE — 6370000000 HC RX 637 (ALT 250 FOR IP): Performed by: INTERNAL MEDICINE

## 2021-01-11 RX ADMIN — SODIUM CHLORIDE: 9 INJECTION, SOLUTION INTRAVENOUS at 13:24

## 2021-01-11 RX ADMIN — SODIUM CHLORIDE: 9 INJECTION, SOLUTION INTRAVENOUS at 20:21

## 2021-01-11 RX ADMIN — ENOXAPARIN SODIUM 40 MG: 40 INJECTION SUBCUTANEOUS at 09:33

## 2021-01-11 RX ADMIN — PAROXETINE HYDROCHLORIDE 20 MG: 20 TABLET, FILM COATED ORAL at 20:15

## 2021-01-11 RX ADMIN — ONDANSETRON 4 MG: 2 INJECTION INTRAMUSCULAR; INTRAVENOUS at 09:33

## 2021-01-11 RX ADMIN — HYDROMORPHONE HYDROCHLORIDE 0.5 MG: 2 INJECTION INTRAMUSCULAR; INTRAVENOUS; SUBCUTANEOUS at 18:10

## 2021-01-11 RX ADMIN — LEVOTHYROXINE SODIUM 200 MCG: 100 TABLET ORAL at 20:15

## 2021-01-11 RX ADMIN — HYDROMORPHONE HYDROCHLORIDE 0.5 MG: 2 INJECTION INTRAMUSCULAR; INTRAVENOUS; SUBCUTANEOUS at 11:40

## 2021-01-11 RX ADMIN — PANTOPRAZOLE SODIUM 40 MG: 40 INJECTION, POWDER, FOR SOLUTION INTRAVENOUS at 09:33

## 2021-01-11 RX ADMIN — HYDROMORPHONE HYDROCHLORIDE 0.25 MG: 2 INJECTION INTRAMUSCULAR; INTRAVENOUS; SUBCUTANEOUS at 02:09

## 2021-01-11 RX ADMIN — ESTRADIOL 0.5 MG: 1 TABLET ORAL at 09:34

## 2021-01-11 RX ADMIN — GABAPENTIN 800 MG: 400 CAPSULE ORAL at 20:15

## 2021-01-11 RX ADMIN — SODIUM CHLORIDE, PRESERVATIVE FREE 10 ML: 5 INJECTION INTRAVENOUS at 20:15

## 2021-01-11 RX ADMIN — LEVETIRACETAM 1000 MG: 100 INJECTION, SOLUTION, CONCENTRATE INTRAVENOUS at 20:15

## 2021-01-11 RX ADMIN — SODIUM CHLORIDE, PRESERVATIVE FREE 10 ML: 5 INJECTION INTRAVENOUS at 09:34

## 2021-01-11 RX ADMIN — PANTOPRAZOLE SODIUM 40 MG: 40 INJECTION, POWDER, FOR SOLUTION INTRAVENOUS at 20:15

## 2021-01-11 RX ADMIN — ONDANSETRON 4 MG: 2 INJECTION INTRAMUSCULAR; INTRAVENOUS at 02:09

## 2021-01-11 RX ADMIN — HYDROMORPHONE HYDROCHLORIDE 0.5 MG: 2 INJECTION INTRAMUSCULAR; INTRAVENOUS; SUBCUTANEOUS at 15:02

## 2021-01-11 RX ADMIN — HYDROMORPHONE HYDROCHLORIDE 0.5 MG: 2 INJECTION INTRAMUSCULAR; INTRAVENOUS; SUBCUTANEOUS at 21:25

## 2021-01-11 RX ADMIN — SODIUM CHLORIDE: 9 INJECTION, SOLUTION INTRAVENOUS at 05:48

## 2021-01-11 RX ADMIN — GABAPENTIN 800 MG: 400 CAPSULE ORAL at 09:33

## 2021-01-11 RX ADMIN — GABAPENTIN 800 MG: 400 CAPSULE ORAL at 15:02

## 2021-01-11 RX ADMIN — OXYCODONE HYDROCHLORIDE AND ACETAMINOPHEN 1 TABLET: 5; 325 TABLET ORAL at 09:37

## 2021-01-11 RX ADMIN — HYDROMORPHONE HYDROCHLORIDE 0.5 MG: 2 INJECTION INTRAMUSCULAR; INTRAVENOUS; SUBCUTANEOUS at 05:49

## 2021-01-11 RX ADMIN — ONDANSETRON 4 MG: 2 INJECTION INTRAMUSCULAR; INTRAVENOUS at 21:25

## 2021-01-11 RX ADMIN — ATENOLOL 25 MG: 25 TABLET ORAL at 09:33

## 2021-01-11 RX ADMIN — LEVETIRACETAM 1000 MG: 100 INJECTION, SOLUTION, CONCENTRATE INTRAVENOUS at 06:49

## 2021-01-11 RX ADMIN — ONDANSETRON 4 MG: 2 INJECTION INTRAMUSCULAR; INTRAVENOUS at 15:01

## 2021-01-11 ASSESSMENT — PAIN SCALES - GENERAL
PAINLEVEL_OUTOF10: 0
PAINLEVEL_OUTOF10: 7
PAINLEVEL_OUTOF10: 0
PAINLEVEL_OUTOF10: 10
PAINLEVEL_OUTOF10: 7
PAINLEVEL_OUTOF10: 7
PAINLEVEL_OUTOF10: 9

## 2021-01-11 ASSESSMENT — PAIN DESCRIPTION - ORIENTATION: ORIENTATION: LOWER

## 2021-01-11 ASSESSMENT — PAIN DESCRIPTION - FREQUENCY
FREQUENCY: CONTINUOUS
FREQUENCY: CONTINUOUS

## 2021-01-11 ASSESSMENT — PAIN DESCRIPTION - ONSET: ONSET: ON-GOING

## 2021-01-11 ASSESSMENT — PAIN DESCRIPTION - LOCATION: LOCATION: BACK

## 2021-01-11 ASSESSMENT — PAIN DESCRIPTION - PROGRESSION: CLINICAL_PROGRESSION: NOT CHANGED

## 2021-01-11 ASSESSMENT — PAIN DESCRIPTION - PAIN TYPE
TYPE: ACUTE PAIN
TYPE: CHRONIC PAIN

## 2021-01-11 NOTE — CARE COORDINATION
.Chart reviewed. The patient is to return home w/mom at discharge. The patient has a PCP and insurance and can drive to doctor's appointments without issues. The patient can afford and take their medications as prescribed. The patient does not require HHC or any DME and is independent in ADL's. She states she has a port for blood work and medications and fluids. She state she has had 10 ports in the last 20 years. The patient denies any other needs at this time.

## 2021-01-12 PROCEDURE — 6370000000 HC RX 637 (ALT 250 FOR IP): Performed by: INTERNAL MEDICINE

## 2021-01-12 PROCEDURE — 6360000002 HC RX W HCPCS: Performed by: INTERNAL MEDICINE

## 2021-01-12 PROCEDURE — 2580000003 HC RX 258: Performed by: INTERNAL MEDICINE

## 2021-01-12 PROCEDURE — 1200000000 HC SEMI PRIVATE

## 2021-01-12 PROCEDURE — 96365 THER/PROPH/DIAG IV INF INIT: CPT

## 2021-01-12 PROCEDURE — 96376 TX/PRO/DX INJ SAME DRUG ADON: CPT

## 2021-01-12 PROCEDURE — C9113 INJ PANTOPRAZOLE SODIUM, VIA: HCPCS | Performed by: INTERNAL MEDICINE

## 2021-01-12 RX ADMIN — ONDANSETRON 4 MG: 2 INJECTION INTRAMUSCULAR; INTRAVENOUS at 12:50

## 2021-01-12 RX ADMIN — HYDROMORPHONE HYDROCHLORIDE 0.5 MG: 2 INJECTION INTRAMUSCULAR; INTRAVENOUS; SUBCUTANEOUS at 02:05

## 2021-01-12 RX ADMIN — SODIUM CHLORIDE: 9 INJECTION, SOLUTION INTRAVENOUS at 05:32

## 2021-01-12 RX ADMIN — HYDROMORPHONE HYDROCHLORIDE 0.25 MG: 2 INJECTION INTRAMUSCULAR; INTRAVENOUS; SUBCUTANEOUS at 09:59

## 2021-01-12 RX ADMIN — SODIUM CHLORIDE: 9 INJECTION, SOLUTION INTRAVENOUS at 23:25

## 2021-01-12 RX ADMIN — HYDROMORPHONE HYDROCHLORIDE 0.25 MG: 2 INJECTION INTRAMUSCULAR; INTRAVENOUS; SUBCUTANEOUS at 12:50

## 2021-01-12 RX ADMIN — LEVETIRACETAM 1000 MG: 100 INJECTION, SOLUTION, CONCENTRATE INTRAVENOUS at 18:12

## 2021-01-12 RX ADMIN — PANTOPRAZOLE SODIUM 40 MG: 40 INJECTION, POWDER, FOR SOLUTION INTRAVENOUS at 23:20

## 2021-01-12 RX ADMIN — ESTRADIOL 0.5 MG: 1 TABLET ORAL at 08:17

## 2021-01-12 RX ADMIN — HYDROMORPHONE HYDROCHLORIDE 0.25 MG: 2 INJECTION INTRAMUSCULAR; INTRAVENOUS; SUBCUTANEOUS at 16:29

## 2021-01-12 RX ADMIN — ONDANSETRON 4 MG: 2 INJECTION INTRAMUSCULAR; INTRAVENOUS at 06:00

## 2021-01-12 RX ADMIN — PANTOPRAZOLE SODIUM 40 MG: 40 INJECTION, POWDER, FOR SOLUTION INTRAVENOUS at 08:18

## 2021-01-12 RX ADMIN — LEVOTHYROXINE SODIUM 200 MCG: 100 TABLET ORAL at 23:19

## 2021-01-12 RX ADMIN — GABAPENTIN 800 MG: 400 CAPSULE ORAL at 23:19

## 2021-01-12 RX ADMIN — LEVETIRACETAM 1000 MG: 100 INJECTION, SOLUTION, CONCENTRATE INTRAVENOUS at 05:32

## 2021-01-12 RX ADMIN — OXYCODONE HYDROCHLORIDE AND ACETAMINOPHEN 1 TABLET: 5; 325 TABLET ORAL at 08:17

## 2021-01-12 RX ADMIN — PAROXETINE HYDROCHLORIDE 20 MG: 20 TABLET, FILM COATED ORAL at 23:19

## 2021-01-12 RX ADMIN — OXYCODONE HYDROCHLORIDE AND ACETAMINOPHEN 1 TABLET: 5; 325 TABLET ORAL at 23:32

## 2021-01-12 RX ADMIN — GABAPENTIN 800 MG: 400 CAPSULE ORAL at 08:17

## 2021-01-12 RX ADMIN — SODIUM CHLORIDE, PRESERVATIVE FREE 10 ML: 5 INJECTION INTRAVENOUS at 23:21

## 2021-01-12 RX ADMIN — HYDROMORPHONE HYDROCHLORIDE 0.5 MG: 2 INJECTION INTRAMUSCULAR; INTRAVENOUS; SUBCUTANEOUS at 06:00

## 2021-01-12 ASSESSMENT — PAIN DESCRIPTION - FREQUENCY
FREQUENCY: CONTINUOUS
FREQUENCY: CONTINUOUS

## 2021-01-12 ASSESSMENT — PAIN DESCRIPTION - LOCATION
LOCATION: ABDOMEN
LOCATION: ABDOMEN

## 2021-01-12 ASSESSMENT — PAIN DESCRIPTION - PAIN TYPE
TYPE: ACUTE PAIN
TYPE: ACUTE PAIN

## 2021-01-12 ASSESSMENT — PAIN DESCRIPTION - ONSET: ONSET: ON-GOING

## 2021-01-12 ASSESSMENT — PAIN DESCRIPTION - DESCRIPTORS
DESCRIPTORS: ACHING
DESCRIPTORS: ACHING

## 2021-01-12 ASSESSMENT — PAIN DESCRIPTION - ORIENTATION: ORIENTATION: RIGHT

## 2021-01-12 ASSESSMENT — PAIN SCALES - GENERAL
PAINLEVEL_OUTOF10: 7
PAINLEVEL_OUTOF10: 8
PAINLEVEL_OUTOF10: 8

## 2021-01-12 NOTE — H&P
HISTORY AND PHYSICAL    2021     Patient Information:    Patient: Belgica Pratt     Gender: female  : 1970   Age: 44 y.o. MRN: [id number]        PCP:  Jolan Duane, MD (Tel: [phone] )    Chief complaint:    Chief Complaint   Patient presents with    Abdominal Pain     RUQ pain into back, hx of pancreatitis           History of Present Illness:  Andre Pope is a 44 y.o. female with morbid obesity , Anxiety , h/o Upper GI bleed few years ago , s/p gastric sleeve on 2019 with mild gastritis , multiple  EGD with MRI and  MRCP on 2020 with non acute result ,gastroparesis with allergy to Reglan  History of pancreatitis , Pt has chronic RUQ abdominal pain associated with nausea, vomiting and she has been admitted multiple  times with same symptoms and a discussion took place for possible biliary stent to be placed as pt had similar pain in th past and it got better significantly after a stent placed , also SBFT done with no active finding . pt presented to ER complaining of headache , nausea , vomiting and poor oral intake for last 3 days associated with increase RUQ abdominal pain . In ER lab data revealed  no ketone in urinanalysis , with mild acidosis with mild leukopenia and chronic anemia    And  CXR : no concern . And Abdomen   CT revealed 1. No acute abnormalities seen in the abdomen or pelvis  2. Unchanged pneumobilia status post cholecystectomy  3. No obstructive uropathy  4. Previous sleeve gastrectomy  5. No evidence for pancreatitis  History obtained from patient .               REVIEW OF SYSTEMS:   Constitutional: Negative for fever,chills . ENT: Negative for rhinorrhea,  sore throat.   Respiratory: Negative for cough, shortness of breath,wheezing  Cardiovascular: Negative for chest pain, palpitations   Gastrointestinal: +  for Abdominal pain, nausea, vomiting, diarrhea  Genitourinary: Negative for polyuria, dysuria   Hematologic/Lymphatic: Negative for bleeding tendency, easy bruising  Musculoskeletal: Negative for myalgias and arthralgias  Neurologic: Negative for confusion,dysarthria. Skin: Negative for itching,rash  Psychiatric: Negative for depression,anxiety, agitation. Endocrine: Negative for polydipsia,polyuria,heat /cold intolerance. Past Medical History:   has a past medical history of Acid reflux, Anemia, Anxiety, Arthritis, Bleeding ulcer, Bronchitis, Chronic back pain, Chronic pain, COPD (chronic obstructive pulmonary disease) ([facility] Depression, Fibromyalgia, H/O echocardiogram, H/O echocardiogram, Hiatal hernia, History of blood transfusion, [place] (hard of hearing), Hx of cardiac catheterization, Hx of transesophageal echocardiography (PILO) for monitoring, HX OTHER MEDICAL, HX OTHER MEDICAL, Hypertension, Kidney cysts, Lupus ([facility] [address] MDRO (multiple drug resistant organisms) resistance, Morbid obesity ([facility] [address] MRSA bacteremia, Nausea, Pain management, Pancreatitis chronic, Pneumonia, Shortness of breath on exertion, Shortness of breath on exertion, Sleep apnea, Staph infection, Staph infection, Teeth missing, Thyroid disease, and Wears glasses. Past Surgical History:   has a past surgical history that includes Lung removal, partial (Left, );  section (1991); Foot surgery (Left, Last Done In ); Dental surgery; Cholecystectomy, laparoscopic (7318'B); other surgical history (1998); other surgical history (Last Done 7-15-16); Tonsillectomy and adenoidectomy (); Appendectomy (1998); Upper gastrointestinal endoscopy (2018); Lap Band (~); Hysterectomy (10/1997); Endoscopy, colon, diagnostic (Last Done In 2018); Colonoscopy (Last Done In 's); Cardiac catheterization (10/24/2010); Sleeve Gastrectomy (N/A, 2019); hiatal hernia repair (N/A, 2019); Upper gastrointestinal endoscopy (N/A, 2019); Foot Debridement (Left, 2019);  Upper gastrointestinal endoscopy (N/A, 6/19/2019); Catheter Removal (N/A, 7/16/2019); Upper gastrointestinal endoscopy (N/A, 7/22/2019); INSERTION / REMOVAL / REPLACEMENT VENOUS ACCESS CATHETER (N/A, 8/15/2019); Upper gastrointestinal endoscopy (N/A, 10/14/2019); [address] Surgery (N/A, 1/15/2020); Port Surgery (N/A, 7/6/2020); and Upper gastrointestinal endoscopy (N/A, 7/17/2020). Medications:  No current facility-administered medications on file prior to encounter. Current Outpatient Medications on File Prior to Encounter   Medication Sig Dispense Refill    ondansetron (ZOFRAN ODT) 4 MG disintegrating tablet Take 1 tablet by mouth every 8 hours as needed for Nausea or Vomiting 30 tablet 5    ferrous sulfate (IRON 325) 325 (65 Fe) MG tablet Take 1 tablet by mouth daily (with breakfast) 90 tablet 5    sucralfate (CARAFATE) 1 GM tablet Take 1 tablet by mouth every 12 hours 120 tablet 3    albuterol sulfate  (90 Base) MCG/ACT inhaler       levETIRAcetam (KEPPRA) 1000 MG tablet Take 1 tablet by mouth 2 times daily 60 tablet 5    pantoprazole (PROTONIX) 40 MG tablet Take 1 tablet by mouth every morning (before breakfast) 90 tablet 1    dicyclomine (BENTYL) 10 MG capsule Take 1 capsule by mouth 3 times daily as needed (abdominal pain) 21 capsule 3    oxyCODONE-acetaminophen (PERCOCET) 5-325 MG per tablet Take 1 tablet by mouth.  Every 4-6 hours PRN      Cholecalciferol (VITAMIN D3) 5000 units TABS Take 1 tablet by mouth daily      estradiol (ESTRACE) 0.5 MG tablet Take 0.5 mg by mouth daily      atenolol (TENORMIN) 25 MG tablet Take 25 mg by mouth daily      Multiple Vitamin (MVI, BARIATRIC ADVANTAGE MULTI-FORMULA, CHEW TAB) Take 1 tablet by mouth daily 30 tablet 5    Calcium Citrate-Vitamin D (CALCIUM + VIT D, BARIATRIC ADVANTAGE, CHEWABLE TABLET) Take 1 tablet by mouth 2 times daily 120 tablet 5    levothyroxine (SYNTHROID) 125 MCG tablet Take 1 tablet by mouth Daily (Patient taking differently: Take 125 mcg by mouth nightly ) 30 tablet 0    gabapentin (NEURONTIN) 800 MG tablet Take 800 mg by mouth 3 times daily      PARoxetine (PAXIL) 30 MG tablet Take 50 mg by mouth nightly          Allergies: Allergies   Allergen Reactions    Aspirin Palpitations     \"My Heart Rate Elevates\"    Shellfish-Derived Products Swelling    Toradol [Ketorolac Tromethamine] Rash    Compazine [Prochlorperazine]     Reglan [Metoclopramide] Itching        Social History:   reports that she has never smoked. She has never used smokeless tobacco. She reports that she does not drink alcohol or use drugs. Family History:  family history includes Arthritis in her mother; Asthma in her father and son; Cancer in her father; Diabetes in her father and mother; Heart Disease in her mother; High Blood Pressure in her brother, father, and mother; High Cholesterol in her mother; Lupus in her daughter; Obesity in her mother; Other in her daughter; Vision Loss in her mother and son. ,     Physical Exam:  /62   Pulse 59   Temp 97.8 °F (36.6 °C) (Oral)   Resp 16   Ht 5' 4\" (1.626 m)   Wt 286 lb (129.7 kg)   SpO2 95%   BMI 49.09 kg/m²     General appearance:  no distress . Well nourished  Eyes: Sclera clear, pupils equal  Cardiovascular: Regular rhythm, normal S1, S2. No edema in lower extremities  Respiratory: Clear to auscultation bilaterally, no wheeze, good inspiratory effort  Gastrointestinal: Abdomen soft, non-tender, not distended, normal bowel sounds  Musculoskeletal: No cyanosis in digits, neck supple  Neurology: Cranial nerves grossly intact. Alert and oriented . No speech or motor deficits  Psychiatry: Appropriate affect.  Not agitated  Skin: Warm, dry, normal turgor, no rash    Labs:  CBC:   Lab Results   Component Value Date    WBC 3.6 01/11/2021    RBC 3.63 01/11/2021    HGB 9.6 01/11/2021    HCT 32.2 01/11/2021    MCV 88.7 01/11/2021    MCH 26.4 01/11/2021    MCHC 29.8 01/11/2021    RDW 14.5 01/11/2021     01/11/2021    MPV 10.2 01/11/2021     BMP:    Lab Results   Component Value Date     01/11/2021    K 4.3 01/11/2021     01/11/2021    CO2 16 01/11/2021    BUN 14 01/11/2021    CREATININE 0.8 01/11/2021    CALCIUM 8.4 01/11/2021    GFRAA >60 01/11/2021    LABGLOM >60 01/11/2021    GLUCOSE 92 01/11/2021       Chest Xray:   EKG:    I visualized CXR images and EKG strips      Patient Active Problem List   Diagnosis Code    Fibromyalgia M79.7    Lupus (systemic lupus erythematosus) ([facility]) M32.9    Chronic pancreatitis ([facility]) K86.1    HTN (hypertension) I10    Generalized abdominal pain R10.84    Frequent UTI N39.0    Gastroesophageal reflux disease without esophagitis K21.9    Depression F32.9    Fatty liver disease, nonalcoholic O12.4    Arthritis M19.90    Bilateral low back pain with left-sided sciatica M54.42    Intractable vomiting with nausea R11.2    Hiatal hernia K44.9    Status post laparoscopic sleeve gastrectomy Z98.84    Pseudoseizure F44.5    Chronic pain syndrome G89.4    Drug-seeking/Aberrant behavior Z76.5    Lymph node disorder I89.9    Morbid obesity with BMI of 40.0-44.9, adult ([facility]) E66.01, Z68.41    Iron deficiency anemia secondary to blood loss (chronic) D50.0    Encounter for weight management Z76.89    Intractable nausea and vomiting R11.2    History of Jet-en-Y gastric bypass Z98.84    Seizure ([facility]) R56.9    Abdominal pain R10.9    Anxiety F41.9    RUQ pain R10.11    Intractable vomiting R11.10    Status post bariatric surgery Z98.84    Morbid obesity with BMI of 45.0-49.9, adult [facility] E66.01, Z68.42         Active Hospital Problems    Diagnosis    Abdominal pain [R10.9]     Priority: Medium    Intractable nausea and vomiting [R11.2]   leukopenia   Metabolic acidosis   acute on chronic pain    s/p gastric sleeve on 2/12/2019  Anxiety  h/o Upper GI bleed few years ago   mild gastritis ,   Gastroparesis with allergy to Reglan   History of pancreatitis  Seizure vs pseudoseizure   Chronic Anemia   Morbid obesity         Assessment/Plan:   Tele   IVF  Percocet PO, Dilaudid  IV  Zofran, Phenergan   Consider GI consult or GI if needed   Home meds , reviewed and resumed as appropriate   Symptoms releif/Pain control  DVT proph             Melyssa Rg MD    1/11/2021 10:21 PM

## 2021-01-13 VITALS
HEART RATE: 74 BPM | TEMPERATURE: 98.6 F | RESPIRATION RATE: 16 BRPM | WEIGHT: 290 LBS | HEIGHT: 64 IN | BODY MASS INDEX: 49.51 KG/M2 | SYSTOLIC BLOOD PRESSURE: 150 MMHG | OXYGEN SATURATION: 98 % | DIASTOLIC BLOOD PRESSURE: 70 MMHG

## 2021-01-13 PROCEDURE — 96376 TX/PRO/DX INJ SAME DRUG ADON: CPT

## 2021-01-13 PROCEDURE — 6370000000 HC RX 637 (ALT 250 FOR IP): Performed by: INTERNAL MEDICINE

## 2021-01-13 PROCEDURE — 6360000002 HC RX W HCPCS: Performed by: INTERNAL MEDICINE

## 2021-01-13 PROCEDURE — 1200000000 HC SEMI PRIVATE

## 2021-01-13 PROCEDURE — 94761 N-INVAS EAR/PLS OXIMETRY MLT: CPT

## 2021-01-13 PROCEDURE — 6360000002 HC RX W HCPCS: Performed by: FAMILY MEDICINE

## 2021-01-13 PROCEDURE — 2580000003 HC RX 258: Performed by: INTERNAL MEDICINE

## 2021-01-13 RX ORDER — PROMETHAZINE HYDROCHLORIDE 12.5 MG/1
12.5 TABLET ORAL 2 TIMES DAILY PRN
Qty: 10 TABLET | Refills: 0 | Status: SHIPPED | OUTPATIENT
Start: 2021-01-13 | End: 2021-01-18

## 2021-01-13 RX ORDER — ONDANSETRON 4 MG/1
4 TABLET, ORALLY DISINTEGRATING ORAL EVERY 8 HOURS PRN
Qty: 30 TABLET | Refills: 5 | Status: SHIPPED | OUTPATIENT
Start: 2021-01-13 | End: 2021-01-26 | Stop reason: CLARIF

## 2021-01-13 RX ORDER — HEPARIN SODIUM (PORCINE) LOCK FLUSH IV SOLN 100 UNIT/ML 100 UNIT/ML
5 SOLUTION INTRAVENOUS PRN
Status: DISCONTINUED | OUTPATIENT
Start: 2021-01-13 | End: 2021-01-14 | Stop reason: HOSPADM

## 2021-01-13 RX ADMIN — GABAPENTIN 800 MG: 400 CAPSULE ORAL at 14:27

## 2021-01-13 RX ADMIN — PROMETHAZINE HYDROCHLORIDE 12.5 MG: 25 TABLET ORAL at 04:16

## 2021-01-13 RX ADMIN — LEVETIRACETAM 1000 MG: 100 INJECTION, SOLUTION, CONCENTRATE INTRAVENOUS at 06:16

## 2021-01-13 RX ADMIN — SODIUM CHLORIDE: 9 INJECTION, SOLUTION INTRAVENOUS at 07:15

## 2021-01-13 RX ADMIN — OXYCODONE HYDROCHLORIDE AND ACETAMINOPHEN 1 TABLET: 5; 325 TABLET ORAL at 10:26

## 2021-01-13 RX ADMIN — ONDANSETRON 4 MG: 2 INJECTION INTRAMUSCULAR; INTRAVENOUS at 08:55

## 2021-01-13 RX ADMIN — Medication 500 UNITS: at 18:28

## 2021-01-13 ASSESSMENT — PAIN DESCRIPTION - LOCATION: LOCATION: ABDOMEN

## 2021-01-13 ASSESSMENT — PAIN SCALES - GENERAL
PAINLEVEL_OUTOF10: 8
PAINLEVEL_OUTOF10: 8

## 2021-01-13 NOTE — PROGRESS NOTES
[facility] NOTE: MEDS TO [address] Select Patient?: No  Total # of Prescriptions Filled: 1   The following medications were delivered to the patient:   ondansetron 4 MG disintegrating tablet    Total # of Interventions Completed: 1  Time Spent (min): 15    Additional Documentation:

## 2021-01-13 NOTE — CARE COORDINATION
Reviewed chart for continued discharge planning. Pt continues to present with no discharge planning needs.

## 2021-01-13 NOTE — DISCHARGE SUMMARY
Patient: Deondre Ramirez MD      Gender: female  : 1970   Age: 44 y.o. MRN: [id number]    Admitting Physician: Aneesh De Luna MD  Discharge Physician: Aneesh De Luna MD     Code Status: Full Code     Admit Date: 1/10/2021   Discharge Date: 21      Disposition:  Home       Condition at Discharge:  stable . Follow-up appointments:  f/u one week with PCP , and with consultants as recommended . Outpatient to do list: f/u surg on Friday       Discharge Diagnoses: Active Hospital Problems    Diagnosis    Abdominal pain [R10.9]     Priority: Medium    Intractable nausea and vomiting [R11.2]   leukopenia   Metabolic acidosis   acute on chronic pain    s/p gastric sleeve on 2019  Anxiety  h/o Upper GI bleed few years ago   mild gastritis ,   Gastroparesis with allergy to Reglan   History of pancreatitis  Seizure vs pseudoseizure   Chronic Anemia   Morbid obesity         History of Present Illness:  Andre Pope is a 44 y.o. female with morbid obesity , Anxiety , h/o Upper GI bleed few years ago , s/p gastric sleeve on 2019 with mild gastritis , multiple  EGD with MRI and  MRCP on 2020 with non acute result ,gastroparesis with allergy to Reglan  History of pancreatitis , Pt has chronic RUQ abdominal pain associated with nausea, vomiting and she has been admitted multiple  times with same symptoms and a discussion took place for possible biliary stent to be placed as pt had similar pain in th past and it got better significantly after a stent placed , also SBFT done with no active finding . pt presented to ER complaining of headache , nausea , vomiting and poor oral intake for last 3 days associated with increase RUQ abdominal pain .    In ER lab data revealed  no ketone in urinanalysis , with mild acidosis with mild leukopenia and chronic anemia    And  CXR : no concern . And Abdomen   CT revealed 1.  No acute abnormalities seen in the abdomen or pelvis  2. Unchanged pneumobilia status post cholecystectomy  3. No obstructive uropathy  4. Previous sleeve gastrectomy  5. No evidence for pancreatitis  History obtained from patient .                Hospital Course:   Tele   IVF  Percocet PO, Dilaudid  IV  Zofran, Phenergan   Consider GI consult or GI if needed   Home meds , reviewed and resumed as appropriate   Symptoms releif/Pain control  DVT proph          Consults. IP CONSULT TO INTERNAL MEDICINE        Discharge Medications:   Current Discharge Medication List      START taking these medications    Details   promethazine (PHENERGAN) 12.5 MG tablet Take 1 tablet by mouth 2 times daily as needed for Nausea  Qty: 10 tablet, Refills: 0           Current Discharge Medication List      CONTINUE these medications which have CHANGED    Details   ondansetron (ZOFRAN ODT) 4 MG disintegrating tablet Take 1 tablet by mouth every 8 hours as needed for Nausea or Vomiting  Qty: 30 tablet, Refills: 5           Current Discharge Medication List      CONTINUE these medications which have NOT CHANGED    Details   ferrous sulfate (IRON 325) 325 (65 Fe) MG tablet Take 1 tablet by mouth daily (with breakfast)  Qty: 90 tablet, Refills: 5      sucralfate (CARAFATE) 1 GM tablet Take 1 tablet by mouth every 12 hours  Qty: 120 tablet, Refills: 3      albuterol sulfate  (90 Base) MCG/ACT inhaler       levETIRAcetam (KEPPRA) 1000 MG tablet Take 1 tablet by mouth 2 times daily  Qty: 60 tablet, Refills: 5      pantoprazole (PROTONIX) 40 MG tablet Take 1 tablet by mouth every morning (before breakfast)  Qty: 90 tablet, Refills: 1      dicyclomine (BENTYL) 10 MG capsule Take 1 capsule by mouth 3 times daily as needed (abdominal pain)  Qty: 21 capsule, Refills: 3      oxyCODONE-acetaminophen (PERCOCET) 5-325 MG per tablet Take 1 tablet by mouth.  Every 4-6 hours PRN      Cholecalciferol (VITAMIN D3) 5000 units TABS Take 1 tablet by mouth daily      estradiol (ESTRACE) 0.5 MG tablet Take 0.5 mg by mouth daily      atenolol (TENORMIN) 25 MG tablet Take 25 mg by mouth daily      Multiple Vitamin (MVI, BARIATRIC ADVANTAGE MULTI-FORMULA, CHEW TAB) Take 1 tablet by mouth daily  Qty: 30 tablet, Refills: 5      Calcium Citrate-Vitamin D (CALCIUM + VIT D, BARIATRIC ADVANTAGE, CHEWABLE TABLET) Take 1 tablet by mouth 2 times daily  Qty: 120 tablet, Refills: 5      levothyroxine (SYNTHROID) 125 MCG tablet Take 1 tablet by mouth Daily  Qty: 30 tablet, Refills: 0      gabapentin (NEURONTIN) 800 MG tablet Take 800 mg by mouth 3 times daily      PARoxetine (PAXIL) 30 MG tablet Take 50 mg by mouth nightly            Current Discharge Medication List          Discharge ROS:  A complete review of systems was asked and negative except for nausea      Discharge Exam:    BP (!) 150/70   Pulse 74   Temp 98.6 °F (37 °C) (Oral)   Resp 16   Ht 5' 4\" (1.626 m)   Wt 290 lb (131.5 kg)   SpO2 98%   BMI 49.78 kg/m²   General appearance:  NAD  Heart[de-identified] Normal s1/s2, RRR, no murmurs, gallops, or rubs. No leg edema  Lungs:  Clear to auscultation, bilaterally without Rales/Wheezes/Rhonchi. Abdomen: Soft, non-tender, non-distended, bowel sounds present  Musculoskeletal:   no cyanosis, no edema  Neurologic:  Cranial nerves: II-XII intact, grossly non-focal.  Psychiatric:  A & O x3      Labs:  For convenience and continuity at follow-up the following most recent labs are provided:    Lab Results   Component Value Date    WBC 3.6 01/11/2021    HGB 9.6 01/11/2021    HCT 32.2 01/11/2021    MCV 88.7 01/11/2021     01/11/2021     01/11/2021    K 4.3 01/11/2021     01/11/2021    CO2 16 01/11/2021    BUN 14 01/11/2021    CREATININE 0.8 01/11/2021    CALCIUM 8.4 01/11/2021    PHOS 4.4 12/04/2015    BNP 88 11/26/2010    ALKPHOS 80 01/11/2021    ALT 12 01/11/2021    AST 24 01/11/2021    BILITOT 0.2 01/11/2021    BILIDIR 0.2 01/02/2016    LABALBU 3.4 01/11/2021    LABALBU 8 11/18/2015    TRIG 161 07/23/2015     Lab Results   Component Value Date    INR 0.93 08/07/2020    INR 0.93 01/10/2020    INR ? 01/09/2020           Chart review shows recent radiographs:  Ct Abdomen Pelvis W Iv Contrast Additional Contrast? None    Result Date: 1/10/2021  EXAMINATION: CT OF THE ABDOMEN AND PELVIS WITH CONTRAST 1/10/2021 10:38 am TECHNIQUE: CT of the abdomen and pelvis was performed with the administration of intravenous contrast. Multiplanar reformatted images are provided for review. Dose modulation, iterative reconstruction, and/or weight based adjustment of the mA/kV was utilized to reduce the radiation dose to as low as reasonably achievable. COMPARISON: 11/03/2020 HISTORY: ORDERING SYSTEM PROVIDED HISTORY: Epigastric pain radiating to the right TECHNOLOGIST PROVIDED HISTORY: Reason for exam:->Epigastric pain radiating to the right Additional Contrast?->None Reason for Exam: abdominal pain Acuity: Acute Type of Exam: Initial Additional signs and symptoms: states \"feels like a flare up of pancreatitis\" Relevant Medical/Surgical History: hx Chronic pancreatitis , appy, hyster, norris FINDINGS: Lower Chest: No acute airspace disease. No pleural or pericardial effusion Organs: Stable pneumobilia. Status post cholecystectomy. The liver appears normal.  The spleen, adrenal glands, pancreas and kidneys are unchanged appearance. GI/Bowel: Previous gastric surgery. The bowel loops are not dilated. No focal bowel wall thickening. Large stool burden in the colon. Pelvis: No mass lesions. No abnormal fluid collections. No bladder or ureteral stones. Peritoneum/Retroperitoneum: No adenopathy. No extraluminal gas. Bones/Soft Tissues: No soft tissue hernia. No acute fracture. No lytic or blastic lesion. 1. No acute abnormalities seen in the abdomen or pelvis 2. Unchanged pneumobilia status post cholecystectomy 3. No obstructive uropathy 4. Previous sleeve gastrectomy 5.  No evidence for pancreatitis     Xr Chest Portable    Result Date: 1/10/2021  EXAMINATION: ONE X-RAY VIEW OF THE CHEST 1/10/2021 8:57 am COMPARISON: 11/03/2020 HISTORY: ORDERING SYSTEM PROVIDED HISTORY:  Chest pain TECHNOLOGIST PROVIDED HISTORY: Reason for exam:   Chest pain FINDINGS: The heart is stable in size. No pleural effusion or pneumothorax is seen. No focal lung consolidation identified. Similar left basilar atelectasis and/or scarring. Similar appearance of the left-sided port catheter. No acute cardiopulmonary abnormality identified. EKG     Rhythm: normal sinus   Rate: normal  Clinical Impression: no acute changes        The patient was seen and examined on day of discharge and this discharge summary is in conjunction with any daily progress note from day of discharge. Time Spent on discharge is   >35  min  in the examination, evaluation, counseling and review of medications and discharge plan.             Veronica Pina MD   1/13/2021

## 2021-01-14 NOTE — PROGRESS NOTES
Physician Progress Note      PATIENTJeanne Brunner  [facility] #:                  [id number]  :                       1970  ADMIT DATE:       1/10/2021 7:44 AM  DISCH DATE:  RESPONDING  PROVIDER #:        Donald Dunn MD          QUERY TEXT:    Internal Medicine,    Pt admitted with chronic abdominal pain. Pt noted to have pneumobilia s/p   cholecystectomy. If possible, please document in progress notes and discharge   summary the relationship, if any, between  the abdominal pain and the   cholecystectomy.     The medical record reflects the following:  Risk Factors: s/p cholecystectomy  Clinical Indicators: documentation of chronic abd pain, N&V post   cholecystectomy, Unchanged pneumobilia status post cholecystectomy  Treatment:  labs, imaging, pain control    Thank you,  Chente Goff RN CDS  [phone]  Options provided:  -- Abdominal pain due to post cholecystectomy syndrome  -- Abdominal pain unrelated to post cholecystectomy syndrome  -- Abdominal pain due to ##please specify, please specify  -- Other - I will add my own diagnosis  -- Disagree - Not applicable / Not valid  -- Disagree - Clinically unable to determine / Unknown  -- Refer to Clinical Documentation Reviewer    PROVIDER RESPONSE TEXT:    this patient has abdominal pain due to gastroparesis    Query created by: Kendra Staples on 2021 9:57 AM      Electronically signed by:  Donald Dunn MD 2021 12:08 AM

## 2021-01-14 NOTE — PROGRESS NOTES
Attending Progress Note      PCP: Marc Rangel MD      Patient: Mary Cervantes   Gender: female  : 1970   Age: 44 y.o. MRN: [id number]  [id number]      Date of Admission: 1/10/2021    Chief Complaint:   Chief Complaint   Patient presents with    Abdominal Pain     RUQ pain into back, hx of pancreatitis           Subjective: had some nausea . . advanced her diet          Medications:  Reviewed  Infusion Medications    sodium chloride 125 mL/hr at 21 0715     Scheduled Medications    atenolol  25 mg Oral Daily    calcium-vitamin D  1 tablet Oral BID    estradiol  0.5 mg Oral Daily    ferrous sulfate  325 mg Oral Daily with breakfast    gabapentin  800 mg Oral TID    levothyroxine  200 mcg Oral Nightly    CENTRUM/CERTA-LILLY with minerals oral  15 mL Oral Daily    PARoxetine  20 mg Oral Nightly    pantoprazole  40 mg Intravenous BID    levetiracetam  1,000 mg Intravenous Q12H    sodium chloride flush  10 mL Intravenous 2 times per day    enoxaparin  40 mg Subcutaneous Daily     PRN Meds: heparin flush, albuterol, oxyCODONE-acetaminophen, sodium chloride flush, promethazine **OR** ondansetron, polyethylene glycol, acetaminophen **OR** acetaminophen    No intake or output data in the 24 hours ending 21    Exam:  BP (!) 150/70   Pulse 74   Temp 98.6 °F (37 °C) (Oral)   Resp 16   Ht 5' 4\" (1.626 m)   Wt 290 lb (131.5 kg)   SpO2 98%   BMI 49.78 kg/m²   General appearance: No distress,   Respiratory:  good air entry , no Rales , No wheezing, or rhonchi,  Cardiovascular: RRR, with normal S1/S2 . Abdomen : Soft,tender, non-distended  , normal bowel sounds. Legs : No edema bilaterally.  No DVT signs ,    Neurologic:  Alert and oriented ,        Labs:   Recent Labs     21  0715   WBC 3.6*   HGB 9.6*   HCT 32.2*        Recent Labs     21  0715      K 4.3      CO2 16*   BUN 14   CREATININE 0.8   CALCIUM 8.4     Recent Labs

## 2021-01-15 ENCOUNTER — OFFICE VISIT (OUTPATIENT)
Dept: BARIATRICS/WEIGHT MGMT | Age: 51
End: 2021-01-15
Payer: COMMERCIAL

## 2021-01-15 VITALS
SYSTOLIC BLOOD PRESSURE: 144 MMHG | HEART RATE: 63 BPM | TEMPERATURE: 97.3 F | WEIGHT: 293 LBS | DIASTOLIC BLOOD PRESSURE: 80 MMHG | HEIGHT: 64 IN | OXYGEN SATURATION: 98 % | BODY MASS INDEX: 50.02 KG/M2

## 2021-01-15 DIAGNOSIS — E66.01 MORBID OBESITY WITH BMI OF 50.0-59.9, ADULT (HCC): ICD-10-CM

## 2021-01-15 DIAGNOSIS — L81.9 DISCOLORATION OF SKIN OF FLANK RESEMBLING ECCHYMOSIS: Primary | ICD-10-CM

## 2021-01-15 DIAGNOSIS — M79.7 FIBROMYALGIA: Chronic | ICD-10-CM

## 2021-01-15 DIAGNOSIS — Z98.84 STATUS POST LAPAROSCOPIC SLEEVE GASTRECTOMY: ICD-10-CM

## 2021-01-15 DIAGNOSIS — K21.9 GASTROESOPHAGEAL REFLUX DISEASE WITHOUT ESOPHAGITIS: ICD-10-CM

## 2021-01-15 DIAGNOSIS — I10 ESSENTIAL HYPERTENSION: ICD-10-CM

## 2021-01-15 PROCEDURE — G8484 FLU IMMUNIZE NO ADMIN: HCPCS | Performed by: SURGERY

## 2021-01-15 PROCEDURE — 3017F COLORECTAL CA SCREEN DOC REV: CPT | Performed by: SURGERY

## 2021-01-15 PROCEDURE — 1111F DSCHRG MED/CURRENT MED MERGE: CPT | Performed by: SURGERY

## 2021-01-15 PROCEDURE — 1036F TOBACCO NON-USER: CPT | Performed by: SURGERY

## 2021-01-15 PROCEDURE — 99214 OFFICE O/P EST MOD 30 MIN: CPT | Performed by: SURGERY

## 2021-01-15 PROCEDURE — G8427 DOCREV CUR MEDS BY ELIG CLIN: HCPCS | Performed by: SURGERY

## 2021-01-15 PROCEDURE — G8417 CALC BMI ABV UP PARAM F/U: HCPCS | Performed by: SURGERY

## 2021-01-15 ASSESSMENT — ENCOUNTER SYMPTOMS
CONSTIPATION: 0
ANAL BLEEDING: 0
ABDOMINAL PAIN: 0
ABDOMINAL DISTENTION: 1
PHOTOPHOBIA: 0
SORE THROAT: 0
COUGH: 0
BACK PAIN: 1
WHEEZING: 0
VOICE CHANGE: 0
NAUSEA: 0
VOMITING: 0
BLOOD IN STOOL: 0
SHORTNESS OF BREATH: 0
TROUBLE SWALLOWING: 0
COLOR CHANGE: 0
DIARRHEA: 0

## 2021-01-15 NOTE — PROGRESS NOTES
BARIATRIC SURGERY POST OPERATIVE NOTE    SUBJECTIVE:    Patient presenting today referred from MIGUE BUSCH MD, for   Chief Complaint   Patient presents with    Weight Management     2nd  visit conversion to RYGB     BP (!) 144/80   Pulse 63   Temp 97.3 °F (36.3 °C)   Ht 5' 4\" (1.626 m)   Wt 294 lb (133.4 kg)   SpO2 98%   BMI 50.46 kg/m²      HPI: Nery Perez is a 44 y.o. female  GAINED 13.7 lBS, she was admitted in the hospital and had IVF, and was NOT eating. . fluid retention MOST likely. .     Addressed the status of the following co-morbidities:   1. Pain and Swelling in all 4 extremities. Nguyễn John worsening. 2. Bruises in her right back. .  worsening. 3. GERD  worsening. 4.         Anemia worsening. Current Outpatient Medications:     ondansetron (ZOFRAN ODT) 4 MG disintegrating tablet, Take 1 tablet by mouth every 8 hours as needed for Nausea or Vomiting, Disp: 30 tablet, Rfl: 5    promethazine (PHENERGAN) 12.5 MG tablet, Take 1 tablet by mouth 2 times daily as needed for Nausea, Disp: 10 tablet, Rfl: 0    ferrous sulfate (IRON 325) 325 (65 Fe) MG tablet, Take 1 tablet by mouth daily (with breakfast), Disp: 90 tablet, Rfl: 5    sucralfate (CARAFATE) 1 GM tablet, Take 1 tablet by mouth every 12 hours, Disp: 120 tablet, Rfl: 3    albuterol sulfate  (90 Base) MCG/ACT inhaler, , Disp: , Rfl:     levETIRAcetam (KEPPRA) 1000 MG tablet, Take 1 tablet by mouth 2 times daily, Disp: 60 tablet, Rfl: 5    pantoprazole (PROTONIX) 40 MG tablet, Take 1 tablet by mouth every morning (before breakfast), Disp: 90 tablet, Rfl: 1    dicyclomine (BENTYL) 10 MG capsule, Take 1 capsule by mouth 3 times daily as needed (abdominal pain), Disp: 21 capsule, Rfl: 3    oxyCODONE-acetaminophen (PERCOCET) 5-325 MG per tablet, Take 1 tablet by mouth.  Every 4-6 hours PRN, Disp: , Rfl:     Cholecalciferol (VITAMIN D3) 5000 units TABS, Take 1 tablet by mouth daily, Disp: , Rfl:     estradiol (ESTRACE) 0.5 MG tablet, Take 0.5 mg by mouth daily, Disp: , Rfl:     atenolol (TENORMIN) 25 MG tablet, Take 25 mg by mouth daily, Disp: , Rfl:     Multiple Vitamin (MVI, BARIATRIC ADVANTAGE MULTI-FORMULA, CHEW TAB), Take 1 tablet by mouth daily, Disp: 30 tablet, Rfl: 5    Calcium Citrate-Vitamin D (CALCIUM + VIT D, BARIATRIC ADVANTAGE, CHEWABLE TABLET), Take 1 tablet by mouth 2 times daily, Disp: 120 tablet, Rfl: 5    levothyroxine (SYNTHROID) 125 MCG tablet, Take 1 tablet by mouth Daily (Patient taking differently: Take 125 mcg by mouth nightly ), Disp: 30 tablet, Rfl: 0    gabapentin (NEURONTIN) 800 MG tablet, Take 800 mg by mouth 3 times daily, Disp: , Rfl:     PARoxetine (PAXIL) 30 MG tablet, Take 50 mg by mouth nightly , Disp: , Rfl:   Past Medical History:   Diagnosis Date    Acid reflux     Anemia     Anxiety     Arthritis     Hands, Back And Ankles    Bleeding ulcer 2014    \"I Had Ulcers In My Stomach And Colon\"/ per pt on 8/12/2019\"they said recently having some blood in my stomach- in July ( 2019)could not find where coming from \"    Bronchitis Last Episode 2014    Chronic back pain     [facility]     [facility]    [facility]ch[facility]) [facility]     Sees Dr. Jayjay Wall    Depression     Fibromyalgia Dx 2013    H/O echocardiogram 8/11/15    EF >55%. LA to be at the upper limit of normal in size. LV hypertrophy with normal LV systolic, but abnormal diastolic function. Normal valvular structures and function.  H/O echocardiogram 08/30/2018    EF 55-60%    Hiatal hernia     History of blood transfusion 09/2015 And 2018    No Reaction To Blood Transfusions Received    [place] (hard of hearing)     Right Ear    Hx of cardiac catheterization 10/24/2010    Angiographically normal coronary arteries w/ normal LV function and wall motion.  Hx of transesophageal echocardiography (PILO) for monitoring 12/2/2010    EF 55-60%. WNL.    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 name   Joseluis Suarezs cysts     \"they found that I have a kidney cyst but not sure which side\" per pt on 7/15/2020    Lupus ([address] Dx 2013    \"Rheumatoid Lupus\"    MDRO (multiple drug resistant organisms) resistance     4 months ago chest from mediport    Morbid obesity ([address]     MRSA bacteremia     Nausea     \"Most Of The Time\"    Pain management     Sees Dr. Ramon Lim, Once A Month    Pancreatitis chronic Dx 2001    Pneumonia Dx 11-15    Shortness of breath on exertion     Shortness of breath on exertion     Sleep apnea     Has CPAP\"no longer use the cpap since lost weight\"    Staph infection Dx 11-15    Left Foot    Staph infection Dx 11-15    \"Left Foot\"    Teeth missing     Upper And Lower    Thyroid disease     Wears glasses     To Read      Past Surgical History:   Procedure Laterality Date    APPENDECTOMY  02/1998    Done When Tubes And Ovaries Were Removed    CARDIAC CATHETERIZATION  10/24/2010    CATHETER REMOVAL N/A 7/16/2019    PORT REMOVAL performed by Zulema Diaz MD at [address] [facility]  08/27/1991    CHOLECYSTECTOMY, LAPAROSCOPIC  1990's    COLONOSCOPY  Last Done In 2000's    DENTAL SURGERY      Teeth Extracted In Past    ENDOSCOPY, COLON, DIAGNOSTIC  Last Done In 2018    FOOT DEBRIDEMENT Left 6/16/2019    FIRST METATARSAL DEBRIDEMENT INCISION AND DRAINAGE. EXCISION OF ULCER.  RESECTION OF BONE. 1ST METATARSAL POWER LAVAGE AND BONE CEMENT performed by Xavier Foote DPM at [facility] [address] Left Last Done In 2016     Dr. Castro Speaker, \" About 6 Surgeries Done Because Of Staph Infection\"    HIATAL HERNIA REPAIR N/A 2/12/2019    HERNIA HIATAL LAPAROSCOPIC ROBOTIC performed by Willem Atkinson MD at [facility]  10/1997    Partial Abdominal Hysterectomy    INSERTION / REMOVAL / REPLACEMENT VENOUS ACCESS CATHETER N/A 8/15/2019    PORT INSERTION performed by Willem Atkinson MD at [facility]    removed after 6 months    LUNG REMOVAL, PARTIAL Left 2008    Benign    OTHER SURGICAL HISTORY  02/1998    \"Tubes And Ovaries Removed, Appendectomy Also Done\"    OTHER SURGICAL HISTORY  Last Done 7-15-16    Mediport Insertion \"Total Of Six Done, Removed Last Mediport In 2014\"    PORT SURGERY N/A 1/15/2020    PORT REMOVAL performed by Willem Atkinson MD at [facility] N/A 7/6/2020    PORT INSERTION performed by Willem Atkinson MD at [facility] N/A 2/12/2019    GASTRECTOMY SLEEVE LAPAROSCOPIC ROBOTIC performed by Willem Atkinson MD at [address] [place] [address]  08/27/2018    UPPER GASTROINTESTINAL ENDOSCOPY N/A 4/2/2019    EGD CONTROL HEMORRHAGE with epi injection at bleeding site performed by Willem Atkinson MD at [address] [employer] 6/19/2019    EGD DIAGNOSTIC ONLY performed by Willem Atkinson MD at [address] [place] [address] [facility] N/A 7/22/2019    EGD DIAGNOSTIC ONLY performed by Emeterio Cadet MD at [address] [place] [address] [facility] N/A 10/14/2019    EGD DIAGNOSTIC ONLY performed by Willem Atkinson MD at [address] [place] [address] [employer] N/A 7/17/2020    EGJ DILATATION BALLOON WITH 18-20 MM BALLOON, DILATED TO 20 MM. performed by Willem Atkinson MD at [facility] Marital status:      Spouse name: None    Number of children: None    Years of education: None    Highest education level: None   Occupational History    None   Social Needs    Financial resource strain: None   Mobilligy insecurity     Worry: None     Inability: None    Transportation needs     Medical: None     Non-medical: None   Tobacco Use    Smoking status: Never Smoker    Smokeless tobacco: Never Used   Substance and Sexual Activity    Alcohol use: No     Alcohol/week: 0.0 standard drinks    Drug use: No    Sexual activity: Not Currently   Lifestyle    Physical activity     Days per week: None     Minutes per session: None    Stress: None   Relationships    Social connections     Talks on phone: None     Gets together: None     Attends Alevism service: None     Active member of club or organization: None     Attends meetings of clubs or organizations: None     Relationship status: None    Intimate partner violence     Fear of current or ex partner: None     Emotionally abused: None     Physically abused: None     Forced sexual activity: None   Other Topics Concern    None   Social History Narrative    None     Family History   Problem Relation Age of Onset    Diabetes Mother         \"Borderline Diabetes\"    High Blood Pressure Mother     Obesity Mother     Arthritis Mother     Heart Disease Mother     High Cholesterol Mother     Vision Loss Mother     Diabetes Father     High Blood Pressure Father     Asthma Father     Cancer Father         prostate cancer    High Blood Pressure Brother     Asthma Son     Vision Loss Son     Lupus Daughter     Other Daughter         \"Alot Of Female Problems\"     Review of Systems   Constitutional: Positive for fatigue. Negative for activity change, chills, diaphoresis and fever. HENT: Negative for sore throat, trouble swallowing and voice change. Eyes: Negative for photophobia and visual disturbance. Respiratory: Negative for cough, shortness of breath and wheezing. Cardiovascular: Negative for chest pain, palpitations and leg swelling. Gastrointestinal: Positive for abdominal distention.  Negative for abdominal pain, anal bleeding, blood in stool, Findings: Bruising (Left Back. . and right breast.. ) present. No erythema or rash. Neurological:      Mental Status: She is alert and oriented to person, place, and time. Cranial Nerves: No cranial nerve deficit. Coordination: Coordination normal.   Psychiatric:         Behavior: Behavior normal.         Thought Content: Thought content normal.         Judgment: Judgment normal.         Orders Placed This Encounter   Procedures    PROTIME-INR     Standing Status:   Future     Standing Expiration Date:   1/15/2022     Order Specific Question:   Daily Coumadin Dose? Answer:   no coumadin but bleeding    APTT     Standing Status:   Future     Standing Expiration Date:   1/15/2022     Order Specific Question:   Daily Heparin Dose? Answer:   no heparin but bleeding        Assessment / Plan:    1. Discoloration of skin of flank resembling ecchymosis    2. Status post laparoscopic sleeve gastrectomy    3. Gastroesophageal reflux disease without esophagitis    4. Fibromyalgia    5. Morbid obesity with BMI of 50.0-59.9, adult ([facility]        Needs PCP will refer her, needs RYGB, will get the clearances, and SOON will do. BUT I need to r/o coag issues, bruised bad. .    NO GERD, but sometimes Vomiting. .    Cardiac and Pulm clearances in progress. Follow Up:  Return in about 4 weeks (around 2/12/2021) for Bariatric follow up: diet, exercise & weight loss, For imaging and tests results review.     Griselda Bartholomew MD, FACS, FICS  Member of the [employer] of [employer] and [employer] Sur[employer]    [phone]    1/15/21

## 2021-01-22 RX ORDER — PROMETHAZINE HYDROCHLORIDE 25 MG/1
TABLET ORAL
COMMUNITY
Start: 2021-01-04 | End: 2021-01-22 | Stop reason: SDUPTHER

## 2021-01-25 LAB
INR BLD: 0.9
PROTIME: 11.8 SECONDS

## 2021-01-25 RX ORDER — PROMETHAZINE HYDROCHLORIDE 25 MG/1
25 TABLET ORAL EVERY 8 HOURS PRN
Qty: 30 TABLET | Refills: 3 | Status: SHIPPED | OUTPATIENT
Start: 2021-01-25 | End: 2021-03-14 | Stop reason: SDUPTHER

## 2021-01-25 NOTE — TELEPHONE ENCOUNTER
Pt called again asking for her phenergan  to be sent to [facility] on [address]

## 2021-01-26 ENCOUNTER — TELEPHONE (OUTPATIENT)
Dept: BARIATRICS/WEIGHT MGMT | Age: 51
End: 2021-01-26

## 2021-01-26 NOTE — TELEPHONE ENCOUNTER
Pt was called to f/u on progress post-op - working toward revision to RYGB. Reviewed food choices and meal plan. Pt had been skipping meals, eating once per day - minimal protein. Reviewed calorie goal 600-800 kcal, at least 60 gms protein. Reviewed high protein food choices Pt tolerating food better mostly. Able to tolerate chicken, PB, raw vegetables well. Doesn't tolerate dairy. Has been drinking soda. Instructed to discontinue use. Encouraged to go back to calorie counting, writing down what she is eating, not skipping meals and focused on protein at each meal. Pt verbalized understanding - limited understanding of calorie counting. Reinforced education on label reading. Will be available for f/u as needed.   Sarah Hayward MS, RDN, LD  1/26/2021

## 2021-01-26 NOTE — TELEPHONE ENCOUNTER
Pt was called for nutrition f/u apt. No answer and unable to LVM. Pt will need to call office back to reschedule visit.   Sole Michelle MS, RDN, LD  1/26/2021

## 2021-01-29 ENCOUNTER — HOSPITAL ENCOUNTER (OUTPATIENT)
Dept: GENERAL RADIOLOGY | Age: 51
Discharge: HOME | End: 2021-01-29
Payer: COMMERCIAL

## 2021-01-29 ENCOUNTER — HOSPITAL ENCOUNTER (OUTPATIENT)
Age: 51
Discharge: HOME | End: 2021-01-29
Payer: COMMERCIAL

## 2021-01-29 DIAGNOSIS — R06.02 SOB (SHORTNESS OF BREATH) ON EXERTION: ICD-10-CM

## 2021-01-29 PROCEDURE — 71046 X-RAY EXAM CHEST 2 VIEWS: CPT

## 2021-02-01 PROBLEM — Z01.818 PRE-OPERATIVE CLEARANCE: Status: ACTIVE | Noted: 2021-02-01

## 2021-02-09 ENCOUNTER — APPOINTMENT (OUTPATIENT)
Dept: CT IMAGING | Age: 51
DRG: 392 | End: 2021-02-09
Payer: COMMERCIAL

## 2021-02-09 ENCOUNTER — HOSPITAL ENCOUNTER (INPATIENT)
Age: 51
LOS: 1 days | Discharge: HOME | DRG: 392 | End: 2021-02-13
Attending: EMERGENCY MEDICINE | Admitting: STUDENT IN AN ORGANIZED HEALTH CARE EDUCATION/TRAINING PROGRAM
Payer: COMMERCIAL

## 2021-02-09 DIAGNOSIS — L81.9 DISCOLORATION OF SKIN OF FLANK RESEMBLING ECCHYMOSIS: ICD-10-CM

## 2021-02-09 DIAGNOSIS — R91.8 GROUND GLASS OPACITY PRESENT ON IMAGING OF LUNG: ICD-10-CM

## 2021-02-09 DIAGNOSIS — R10.13 ABDOMINAL PAIN, EPIGASTRIC: ICD-10-CM

## 2021-02-09 DIAGNOSIS — R19.7 NAUSEA VOMITING AND DIARRHEA: ICD-10-CM

## 2021-02-09 DIAGNOSIS — R91.1 LUNG NODULE: ICD-10-CM

## 2021-02-09 DIAGNOSIS — R07.9 CHEST PAIN, UNSPECIFIED TYPE: Primary | ICD-10-CM

## 2021-02-09 DIAGNOSIS — R11.2 NAUSEA VOMITING AND DIARRHEA: ICD-10-CM

## 2021-02-09 LAB
ALBUMIN SERPL-MCNC: 3.7 GM/DL (ref 3.4–5)
ALP BLD-CCNC: 84 IU/L (ref 40–129)
ALT SERPL-CCNC: 12 U/L (ref 10–40)
ANION GAP SERPL CALCULATED.3IONS-SCNC: 8 MMOL/L (ref 4–16)
AST SERPL-CCNC: 14 IU/L (ref 15–37)
BACTERIA: NEGATIVE /HPF
BASOPHILS ABSOLUTE: 0 K/CU MM
BASOPHILS RELATIVE PERCENT: 0.7 % (ref 0–1)
BILIRUB SERPL-MCNC: 0.1 MG/DL (ref 0–1)
BILIRUBIN URINE: NEGATIVE MG/DL
BLOOD, URINE: NEGATIVE
BUN BLDV-MCNC: 18 MG/DL (ref 6–23)
CALCIUM SERPL-MCNC: 9.2 MG/DL (ref 8.3–10.6)
CHLORIDE BLD-SCNC: 103 MMOL/L (ref 99–110)
CLARITY: CLEAR
CO2: 25 MMOL/L (ref 21–32)
COLOR: YELLOW
CREAT SERPL-MCNC: 0.7 MG/DL (ref 0.6–1.1)
DIFFERENTIAL TYPE: ABNORMAL
EOSINOPHILS ABSOLUTE: 0.1 K/CU MM
EOSINOPHILS RELATIVE PERCENT: 3.1 % (ref 0–3)
GFR AFRICAN AMERICAN: >60 ML/MIN/1.73M2
GFR NON-AFRICAN AMERICAN: >60 ML/MIN/1.73M2
GLUCOSE BLD-MCNC: 86 MG/DL (ref 70–99)
GLUCOSE, URINE: NEGATIVE MG/DL
HCT VFR BLD CALC: 32.9 % (ref 37–47)
HEMOGLOBIN: 10.2 GM/DL (ref 12.5–16)
IMMATURE NEUTROPHIL %: 0 % (ref 0–0.43)
KETONES, URINE: NEGATIVE MG/DL
LEUKOCYTE ESTERASE, URINE: NEGATIVE
LIPASE: 17 IU/L (ref 13–60)
LYMPHOCYTES ABSOLUTE: 1.9 K/CU MM
LYMPHOCYTES RELATIVE PERCENT: 41.3 % (ref 24–44)
MCH RBC QN AUTO: 25.4 PG (ref 27–31)
MCHC RBC AUTO-ENTMCNC: 31 % (ref 32–36)
MCV RBC AUTO: 82 FL (ref 78–100)
MONOCYTES ABSOLUTE: 0.3 K/CU MM
MONOCYTES RELATIVE PERCENT: 6.6 % (ref 0–4)
MUCUS: ABNORMAL HPF
NITRITE URINE, QUANTITATIVE: NEGATIVE
NUCLEATED RBC %: 0 %
PDW BLD-RTO: 14.1 % (ref 11.7–14.9)
PH, URINE: 6 (ref 5–8)
PLATELET # BLD: 160 K/CU MM (ref 140–440)
PMV BLD AUTO: 10.4 FL (ref 7.5–11.1)
POTASSIUM SERPL-SCNC: 4 MMOL/L (ref 3.5–5.1)
PROTEIN UA: NEGATIVE MG/DL
RBC # BLD: 4.01 M/CU MM (ref 4.2–5.4)
RBC URINE: <1 /HPF (ref 0–6)
SEGMENTED NEUTROPHILS ABSOLUTE COUNT: 2.2 K/CU MM
SEGMENTED NEUTROPHILS RELATIVE PERCENT: 48.3 % (ref 36–66)
SODIUM BLD-SCNC: 136 MMOL/L (ref 135–145)
SPECIFIC GRAVITY UA: 1.01 (ref 1–1.03)
SQUAMOUS EPITHELIAL: 2 /HPF
TOTAL IMMATURE NEUTOROPHIL: 0 K/CU MM
TOTAL NUCLEATED RBC: 0 K/CU MM
TOTAL PROTEIN: 6.7 GM/DL (ref 6.4–8.2)
TRICHOMONAS: ABNORMAL /HPF
TROPONIN T: <0.01 NG/ML
UROBILINOGEN, URINE: NEGATIVE MG/DL (ref 0.2–1)
WBC # BLD: 4.6 K/CU MM (ref 4–10.5)
WBC UA: 1 /HPF (ref 0–5)

## 2021-02-09 PROCEDURE — 96375 TX/PRO/DX INJ NEW DRUG ADDON: CPT

## 2021-02-09 PROCEDURE — 85025 COMPLETE CBC W/AUTO DIFF WBC: CPT

## 2021-02-09 PROCEDURE — 80053 COMPREHEN METABOLIC PANEL: CPT

## 2021-02-09 PROCEDURE — 96372 THER/PROPH/DIAG INJ SC/IM: CPT

## 2021-02-09 PROCEDURE — 2580000003 HC RX 258

## 2021-02-09 PROCEDURE — 6360000002 HC RX W HCPCS: Performed by: PHYSICIAN ASSISTANT

## 2021-02-09 PROCEDURE — 80177 DRUG SCRN QUAN LEVETIRACETAM: CPT

## 2021-02-09 PROCEDURE — 74177 CT ABD & PELVIS W/CONTRAST: CPT

## 2021-02-09 PROCEDURE — G0378 HOSPITAL OBSERVATION PER HR: HCPCS

## 2021-02-09 PROCEDURE — 2580000003 HC RX 258: Performed by: PHYSICIAN ASSISTANT

## 2021-02-09 PROCEDURE — U0002 COVID-19 LAB TEST NON-CDC: HCPCS

## 2021-02-09 PROCEDURE — 81001 URINALYSIS AUTO W/SCOPE: CPT

## 2021-02-09 PROCEDURE — 6360000004 HC RX CONTRAST MEDICATION: Performed by: PHYSICIAN ASSISTANT

## 2021-02-09 PROCEDURE — 96374 THER/PROPH/DIAG INJ IV PUSH: CPT

## 2021-02-09 PROCEDURE — 84484 ASSAY OF TROPONIN QUANT: CPT

## 2021-02-09 PROCEDURE — 71275 CT ANGIOGRAPHY CHEST: CPT

## 2021-02-09 PROCEDURE — 99283 EMERGENCY DEPT VISIT LOW MDM: CPT

## 2021-02-09 PROCEDURE — 83690 ASSAY OF LIPASE: CPT

## 2021-02-09 PROCEDURE — 96376 TX/PRO/DX INJ SAME DRUG ADON: CPT

## 2021-02-09 PROCEDURE — 93005 ELECTROCARDIOGRAM TRACING: CPT | Performed by: PHYSICIAN ASSISTANT

## 2021-02-09 RX ORDER — 0.9 % SODIUM CHLORIDE 0.9 %
1000 INTRAVENOUS SOLUTION INTRAVENOUS ONCE
Status: COMPLETED | OUTPATIENT
Start: 2021-02-09 | End: 2021-02-09

## 2021-02-09 RX ORDER — MORPHINE SULFATE 4 MG/ML
4 INJECTION, SOLUTION INTRAMUSCULAR; INTRAVENOUS ONCE
Status: COMPLETED | OUTPATIENT
Start: 2021-02-09 | End: 2021-02-09

## 2021-02-09 RX ORDER — PROMETHAZINE HYDROCHLORIDE 25 MG/ML
25 INJECTION, SOLUTION INTRAMUSCULAR; INTRAVENOUS ONCE
Status: COMPLETED | OUTPATIENT
Start: 2021-02-09 | End: 2021-02-09

## 2021-02-09 RX ORDER — ONDANSETRON 2 MG/ML
4 INJECTION INTRAMUSCULAR; INTRAVENOUS ONCE
Status: COMPLETED | OUTPATIENT
Start: 2021-02-09 | End: 2021-02-09

## 2021-02-09 RX ORDER — SODIUM CHLORIDE 0.9 % (FLUSH) 0.9 %
10 SYRINGE (ML) INJECTION 2 TIMES DAILY
Status: DISCONTINUED | OUTPATIENT
Start: 2021-02-09 | End: 2021-02-13 | Stop reason: HOSPADM

## 2021-02-09 RX ADMIN — PROMETHAZINE HYDROCHLORIDE 25 MG: 25 INJECTION INTRAMUSCULAR; INTRAVENOUS at 21:19

## 2021-02-09 RX ADMIN — SODIUM CHLORIDE, PRESERVATIVE FREE 10 ML: 5 INJECTION INTRAVENOUS at 21:03

## 2021-02-09 RX ADMIN — IOPAMIDOL 90 ML: 755 INJECTION, SOLUTION INTRAVENOUS at 21:02

## 2021-02-09 RX ADMIN — ONDANSETRON 4 MG: 2 INJECTION INTRAMUSCULAR; INTRAVENOUS at 19:19

## 2021-02-09 RX ADMIN — SODIUM CHLORIDE 1000 ML: 9 INJECTION, SOLUTION INTRAVENOUS at 19:19

## 2021-02-09 RX ADMIN — SODIUM CHLORIDE, PRESERVATIVE FREE 10 ML: 5 INJECTION INTRAVENOUS at 21:00

## 2021-02-09 RX ADMIN — MORPHINE SULFATE 4 MG: 4 INJECTION, SOLUTION INTRAMUSCULAR; INTRAVENOUS at 21:18

## 2021-02-09 RX ADMIN — MORPHINE SULFATE 4 MG: 4 INJECTION, SOLUTION INTRAMUSCULAR; INTRAVENOUS at 19:20

## 2021-02-10 LAB
ANION GAP SERPL CALCULATED.3IONS-SCNC: 10 MMOL/L (ref 4–16)
ANION GAP SERPL CALCULATED.3IONS-SCNC: 8 MMOL/L (ref 4–16)
BUN BLDV-MCNC: 10 MG/DL (ref 6–23)
BUN BLDV-MCNC: 13 MG/DL (ref 6–23)
CALCIUM SERPL-MCNC: 8.8 MG/DL (ref 8.3–10.6)
CALCIUM SERPL-MCNC: 8.9 MG/DL (ref 8.3–10.6)
CHLORIDE BLD-SCNC: 105 MMOL/L (ref 99–110)
CHLORIDE BLD-SCNC: 105 MMOL/L (ref 99–110)
CHOLESTEROL: 146 MG/DL
CO2: 20 MMOL/L (ref 21–32)
CO2: 23 MMOL/L (ref 21–32)
CREAT SERPL-MCNC: 0.7 MG/DL (ref 0.6–1.1)
CREAT SERPL-MCNC: 0.8 MG/DL (ref 0.6–1.1)
GFR AFRICAN AMERICAN: >60 ML/MIN/1.73M2
GFR AFRICAN AMERICAN: >60 ML/MIN/1.73M2
GFR NON-AFRICAN AMERICAN: >60 ML/MIN/1.73M2
GFR NON-AFRICAN AMERICAN: >60 ML/MIN/1.73M2
GLUCOSE BLD-MCNC: 110 MG/DL (ref 70–99)
GLUCOSE BLD-MCNC: 111 MG/DL (ref 70–99)
GLUCOSE BLD-MCNC: 65 MG/DL (ref 70–99)
GLUCOSE BLD-MCNC: 96 MG/DL (ref 70–99)
HCT VFR BLD CALC: 36.2 % (ref 37–47)
HDLC SERPL-MCNC: 51 MG/DL
HEMOGLOBIN: 10.7 GM/DL (ref 12.5–16)
LDL CHOLESTEROL DIRECT: 87 MG/DL
MCH RBC QN AUTO: 25.7 PG (ref 27–31)
MCHC RBC AUTO-ENTMCNC: 29.6 % (ref 32–36)
MCV RBC AUTO: 87 FL (ref 78–100)
PDW BLD-RTO: 14.1 % (ref 11.7–14.9)
PLATELET # BLD: 152 K/CU MM (ref 140–440)
PMV BLD AUTO: 10.3 FL (ref 7.5–11.1)
POTASSIUM SERPL-SCNC: 3.7 MMOL/L (ref 3.5–5.1)
POTASSIUM SERPL-SCNC: 4.3 MMOL/L (ref 3.5–5.1)
RBC # BLD: 4.16 M/CU MM (ref 4.2–5.4)
REASON FOR REJECTION: NORMAL
REJECTED TEST: NORMAL
SARS-COV-2, NAAT: NOT DETECTED
SARS-COV-2: NORMAL
SODIUM BLD-SCNC: 135 MMOL/L (ref 135–145)
SODIUM BLD-SCNC: 136 MMOL/L (ref 135–145)
SOURCE: NORMAL
TRIGL SERPL-MCNC: 52 MG/DL
TROPONIN T: <0.01 NG/ML
WBC # BLD: 4 K/CU MM (ref 4–10.5)

## 2021-02-10 PROCEDURE — 2580000003 HC RX 258: Performed by: STUDENT IN AN ORGANIZED HEALTH CARE EDUCATION/TRAINING PROGRAM

## 2021-02-10 PROCEDURE — G0378 HOSPITAL OBSERVATION PER HR: HCPCS

## 2021-02-10 PROCEDURE — 85027 COMPLETE CBC AUTOMATED: CPT

## 2021-02-10 PROCEDURE — 93005 ELECTROCARDIOGRAM TRACING: CPT | Performed by: STUDENT IN AN ORGANIZED HEALTH CARE EDUCATION/TRAINING PROGRAM

## 2021-02-10 PROCEDURE — 99222 1ST HOSP IP/OBS MODERATE 55: CPT | Performed by: INTERNAL MEDICINE

## 2021-02-10 PROCEDURE — 6360000002 HC RX W HCPCS: Performed by: INTERNAL MEDICINE

## 2021-02-10 PROCEDURE — 93005 ELECTROCARDIOGRAM TRACING: CPT | Performed by: HOSPITALIST

## 2021-02-10 PROCEDURE — 96376 TX/PRO/DX INJ SAME DRUG ADON: CPT

## 2021-02-10 PROCEDURE — 80048 BASIC METABOLIC PNL TOTAL CA: CPT

## 2021-02-10 PROCEDURE — 96372 THER/PROPH/DIAG INJ SC/IM: CPT

## 2021-02-10 PROCEDURE — 96365 THER/PROPH/DIAG IV INF INIT: CPT

## 2021-02-10 PROCEDURE — 6360000002 HC RX W HCPCS: Performed by: STUDENT IN AN ORGANIZED HEALTH CARE EDUCATION/TRAINING PROGRAM

## 2021-02-10 PROCEDURE — 6360000002 HC RX W HCPCS

## 2021-02-10 PROCEDURE — 83721 ASSAY OF BLOOD LIPOPROTEIN: CPT

## 2021-02-10 PROCEDURE — 6360000002 HC RX W HCPCS: Performed by: PHYSICIAN ASSISTANT

## 2021-02-10 PROCEDURE — 80061 LIPID PANEL: CPT

## 2021-02-10 PROCEDURE — 96375 TX/PRO/DX INJ NEW DRUG ADDON: CPT

## 2021-02-10 PROCEDURE — 6360000004 HC RX CONTRAST MEDICATION

## 2021-02-10 PROCEDURE — 36415 COLL VENOUS BLD VENIPUNCTURE: CPT

## 2021-02-10 PROCEDURE — 94761 N-INVAS EAR/PLS OXIMETRY MLT: CPT

## 2021-02-10 PROCEDURE — 96366 THER/PROPH/DIAG IV INF ADDON: CPT

## 2021-02-10 PROCEDURE — 84484 ASSAY OF TROPONIN QUANT: CPT

## 2021-02-10 PROCEDURE — 6370000000 HC RX 637 (ALT 250 FOR IP): Performed by: STUDENT IN AN ORGANIZED HEALTH CARE EDUCATION/TRAINING PROGRAM

## 2021-02-10 PROCEDURE — 82962 GLUCOSE BLOOD TEST: CPT

## 2021-02-10 PROCEDURE — 93350 STRESS TTE ONLY: CPT

## 2021-02-10 PROCEDURE — 6360000002 HC RX W HCPCS: Performed by: HOSPITALIST

## 2021-02-10 PROCEDURE — 2580000003 HC RX 258: Performed by: HOSPITALIST

## 2021-02-10 RX ORDER — ATROPINE SULFATE 0.1 MG/ML
0.5 INJECTION INTRAVENOUS EVERY 5 MIN PRN
Status: CANCELLED | OUTPATIENT
Start: 2021-02-10 | End: 2021-02-10

## 2021-02-10 RX ORDER — GABAPENTIN 400 MG/1
800 CAPSULE ORAL 3 TIMES DAILY
Status: DISCONTINUED | OUTPATIENT
Start: 2021-02-10 | End: 2021-02-13 | Stop reason: HOSPADM

## 2021-02-10 RX ORDER — MORPHINE SULFATE 2 MG/ML
2 INJECTION, SOLUTION INTRAMUSCULAR; INTRAVENOUS EVERY 6 HOURS PRN
Status: DISCONTINUED | OUTPATIENT
Start: 2021-02-10 | End: 2021-02-10

## 2021-02-10 RX ORDER — LORAZEPAM 2 MG/ML
1 INJECTION INTRAMUSCULAR ONCE
Status: DISCONTINUED | OUTPATIENT
Start: 2021-02-10 | End: 2021-02-13 | Stop reason: HOSPADM

## 2021-02-10 RX ORDER — DEXTROSE MONOHYDRATE 25 G/50ML
INJECTION, SOLUTION INTRAVENOUS
Status: COMPLETED | OUTPATIENT
Start: 2021-02-10 | End: 2021-02-10

## 2021-02-10 RX ORDER — SODIUM CHLORIDE 0.9 % (FLUSH) 0.9 %
10 SYRINGE (ML) INJECTION EVERY 12 HOURS SCHEDULED
Status: DISCONTINUED | OUTPATIENT
Start: 2021-02-10 | End: 2021-02-13 | Stop reason: HOSPADM

## 2021-02-10 RX ORDER — DICYCLOMINE HYDROCHLORIDE 10 MG/1
10 CAPSULE ORAL 3 TIMES DAILY PRN
Status: DISCONTINUED | OUTPATIENT
Start: 2021-02-10 | End: 2021-02-13 | Stop reason: HOSPADM

## 2021-02-10 RX ORDER — SODIUM CHLORIDE 0.9 % (FLUSH) 0.9 %
10 SYRINGE (ML) INJECTION PRN
Status: CANCELLED | OUTPATIENT
Start: 2021-02-10 | End: 2021-02-10

## 2021-02-10 RX ORDER — ATENOLOL 25 MG/1
25 TABLET ORAL DAILY
Status: DISCONTINUED | OUTPATIENT
Start: 2021-02-10 | End: 2021-02-13 | Stop reason: HOSPADM

## 2021-02-10 RX ORDER — ONDANSETRON 2 MG/ML
4 INJECTION INTRAMUSCULAR; INTRAVENOUS EVERY 6 HOURS PRN
Status: DISCONTINUED | OUTPATIENT
Start: 2021-02-10 | End: 2021-02-13 | Stop reason: HOSPADM

## 2021-02-10 RX ORDER — DOBUTAMINE HYDROCHLORIDE 200 MG/100ML
5 INJECTION INTRAVENOUS CONTINUOUS
Status: DISCONTINUED | OUTPATIENT
Start: 2021-02-10 | End: 2021-02-10

## 2021-02-10 RX ORDER — ACETAMINOPHEN 325 MG/1
650 TABLET ORAL EVERY 6 HOURS PRN
Status: DISCONTINUED | OUTPATIENT
Start: 2021-02-10 | End: 2021-02-13 | Stop reason: HOSPADM

## 2021-02-10 RX ORDER — LEVOTHYROXINE SODIUM 0.12 MG/1
125 TABLET ORAL NIGHTLY
Status: DISCONTINUED | OUTPATIENT
Start: 2021-02-10 | End: 2021-02-13 | Stop reason: HOSPADM

## 2021-02-10 RX ORDER — PROMETHAZINE HYDROCHLORIDE 12.5 MG/1
12.5 TABLET ORAL EVERY 6 HOURS PRN
Status: DISCONTINUED | OUTPATIENT
Start: 2021-02-10 | End: 2021-02-13 | Stop reason: HOSPADM

## 2021-02-10 RX ORDER — SODIUM CHLORIDE 9 MG/ML
500 INJECTION, SOLUTION INTRAVENOUS CONTINUOUS PRN
Status: CANCELLED | OUTPATIENT
Start: 2021-02-10 | End: 2021-02-10

## 2021-02-10 RX ORDER — OXYCODONE HYDROCHLORIDE AND ACETAMINOPHEN 5; 325 MG/1; MG/1
1 TABLET ORAL EVERY 8 HOURS PRN
Status: DISCONTINUED | OUTPATIENT
Start: 2021-02-10 | End: 2021-02-11

## 2021-02-10 RX ORDER — NITROGLYCERIN 0.4 MG/1
0.4 TABLET SUBLINGUAL EVERY 5 MIN PRN
Status: CANCELLED | OUTPATIENT
Start: 2021-02-10 | End: 2021-02-10

## 2021-02-10 RX ORDER — LEVETIRACETAM 500 MG/1
1000 TABLET ORAL 2 TIMES DAILY
Status: DISCONTINUED | OUTPATIENT
Start: 2021-02-10 | End: 2021-02-10

## 2021-02-10 RX ORDER — ACETAMINOPHEN 650 MG/1
650 SUPPOSITORY RECTAL EVERY 6 HOURS PRN
Status: DISCONTINUED | OUTPATIENT
Start: 2021-02-10 | End: 2021-02-13 | Stop reason: HOSPADM

## 2021-02-10 RX ORDER — METOPROLOL TARTRATE 5 MG/5ML
5 INJECTION INTRAVENOUS EVERY 5 MIN PRN
Status: CANCELLED | OUTPATIENT
Start: 2021-02-10 | End: 2021-02-10

## 2021-02-10 RX ORDER — MORPHINE SULFATE 2 MG/ML
2 INJECTION, SOLUTION INTRAMUSCULAR; INTRAVENOUS ONCE
Status: COMPLETED | OUTPATIENT
Start: 2021-02-10 | End: 2021-02-10

## 2021-02-10 RX ORDER — PROMETHAZINE HYDROCHLORIDE 25 MG/ML
12.5 INJECTION, SOLUTION INTRAMUSCULAR; INTRAVENOUS ONCE
Status: COMPLETED | OUTPATIENT
Start: 2021-02-10 | End: 2021-02-10

## 2021-02-10 RX ORDER — PROMETHAZINE HYDROCHLORIDE 25 MG/ML
12.5 INJECTION, SOLUTION INTRAMUSCULAR; INTRAVENOUS EVERY 6 HOURS PRN
Status: DISCONTINUED | OUTPATIENT
Start: 2021-02-10 | End: 2021-02-13 | Stop reason: HOSPADM

## 2021-02-10 RX ORDER — ALBUTEROL SULFATE 90 UG/1
2 AEROSOL, METERED RESPIRATORY (INHALATION) PRN
Status: CANCELLED | OUTPATIENT
Start: 2021-02-10 | End: 2021-02-10

## 2021-02-10 RX ORDER — SODIUM CHLORIDE 0.9 % (FLUSH) 0.9 %
10 SYRINGE (ML) INJECTION PRN
Status: DISCONTINUED | OUTPATIENT
Start: 2021-02-10 | End: 2021-02-13 | Stop reason: HOSPADM

## 2021-02-10 RX ORDER — LORAZEPAM 2 MG/ML
INJECTION INTRAMUSCULAR
Status: COMPLETED
Start: 2021-02-10 | End: 2021-02-10

## 2021-02-10 RX ORDER — POLYETHYLENE GLYCOL 3350 17 G/17G
17 POWDER, FOR SOLUTION ORAL DAILY PRN
Status: DISCONTINUED | OUTPATIENT
Start: 2021-02-10 | End: 2021-02-13 | Stop reason: HOSPADM

## 2021-02-10 RX ORDER — NITROGLYCERIN 0.4 MG/1
0.4 TABLET SUBLINGUAL EVERY 5 MIN PRN
Status: CANCELLED | OUTPATIENT
Start: 2021-02-10

## 2021-02-10 RX ORDER — MORPHINE SULFATE 2 MG/ML
2 INJECTION, SOLUTION INTRAMUSCULAR; INTRAVENOUS EVERY 4 HOURS PRN
Status: DISCONTINUED | OUTPATIENT
Start: 2021-02-10 | End: 2021-02-11

## 2021-02-10 RX ADMIN — ENOXAPARIN SODIUM 40 MG: 100 INJECTION SUBCUTANEOUS at 10:07

## 2021-02-10 RX ADMIN — LEVOTHYROXINE SODIUM 125 MCG: 125 TABLET ORAL at 20:32

## 2021-02-10 RX ADMIN — OXYCODONE HYDROCHLORIDE AND ACETAMINOPHEN 1 TABLET: 5; 325 TABLET ORAL at 01:18

## 2021-02-10 RX ADMIN — MORPHINE SULFATE 2 MG: 2 INJECTION, SOLUTION INTRAMUSCULAR; INTRAVENOUS at 23:37

## 2021-02-10 RX ADMIN — ONDANSETRON 4 MG: 2 INJECTION INTRAMUSCULAR; INTRAVENOUS at 10:02

## 2021-02-10 RX ADMIN — SODIUM CHLORIDE, PRESERVATIVE FREE 10 ML: 5 INJECTION INTRAVENOUS at 20:33

## 2021-02-10 RX ADMIN — SODIUM CHLORIDE, PRESERVATIVE FREE 10 ML: 5 INJECTION INTRAVENOUS at 10:02

## 2021-02-10 RX ADMIN — SODIUM CHLORIDE, PRESERVATIVE FREE 10 ML: 5 INJECTION INTRAVENOUS at 12:45

## 2021-02-10 RX ADMIN — LEVETIRACETAM 1000 MG: 100 INJECTION, SOLUTION INTRAVENOUS at 23:32

## 2021-02-10 RX ADMIN — MORPHINE SULFATE 2 MG: 2 INJECTION, SOLUTION INTRAMUSCULAR; INTRAVENOUS at 06:39

## 2021-02-10 RX ADMIN — PAROXETINE 50 MG: 10 TABLET, FILM COATED ORAL at 20:32

## 2021-02-10 RX ADMIN — ONDANSETRON 4 MG: 2 INJECTION INTRAMUSCULAR; INTRAVENOUS at 01:18

## 2021-02-10 RX ADMIN — ONDANSETRON 4 MG: 2 INJECTION INTRAMUSCULAR; INTRAVENOUS at 15:29

## 2021-02-10 RX ADMIN — GABAPENTIN 800 MG: 400 CAPSULE ORAL at 20:32

## 2021-02-10 RX ADMIN — LORAZEPAM 1 MG: 2 INJECTION, SOLUTION INTRAMUSCULAR; INTRAVENOUS at 11:02

## 2021-02-10 RX ADMIN — MORPHINE SULFATE 2 MG: 2 INJECTION, SOLUTION INTRAMUSCULAR; INTRAVENOUS at 12:18

## 2021-02-10 RX ADMIN — PROMETHAZINE HYDROCHLORIDE 12.5 MG: 25 INJECTION INTRAMUSCULAR; INTRAVENOUS at 11:15

## 2021-02-10 RX ADMIN — SODIUM CHLORIDE, PRESERVATIVE FREE 10 ML: 5 INJECTION INTRAVENOUS at 12:18

## 2021-02-10 RX ADMIN — PERFLUTREN 8 MG: 6.52 INJECTION, SUSPENSION INTRAVENOUS at 13:51

## 2021-02-10 RX ADMIN — PROMETHAZINE HYDROCHLORIDE 12.5 MG: 25 INJECTION INTRAMUSCULAR; INTRAVENOUS at 06:39

## 2021-02-10 RX ADMIN — MORPHINE SULFATE 2 MG: 2 INJECTION, SOLUTION INTRAMUSCULAR; INTRAVENOUS at 01:49

## 2021-02-10 RX ADMIN — SODIUM CHLORIDE, PRESERVATIVE FREE 10 ML: 5 INJECTION INTRAVENOUS at 15:30

## 2021-02-10 RX ADMIN — DEXTROSE MONOHYDRATE 12.5 G: 25 INJECTION, SOLUTION INTRAVENOUS at 11:03

## 2021-02-10 RX ADMIN — LEVETIRACETAM 1000 MG: 100 INJECTION, SOLUTION INTRAVENOUS at 12:24

## 2021-02-10 RX ADMIN — DOBUTAMINE HYDROCHLORIDE 10 MCG/KG/MIN: 200 INJECTION INTRAVENOUS at 14:09

## 2021-02-10 RX ADMIN — PROMETHAZINE HYDROCHLORIDE 12.5 MG: 25 INJECTION INTRAMUSCULAR; INTRAVENOUS at 17:27

## 2021-02-10 RX ADMIN — MORPHINE SULFATE 2 MG: 2 INJECTION, SOLUTION INTRAMUSCULAR; INTRAVENOUS at 17:28

## 2021-02-10 ASSESSMENT — PAIN SCALES - GENERAL
PAINLEVEL_OUTOF10: 7
PAINLEVEL_OUTOF10: 7
PAINLEVEL_OUTOF10: 6
PAINLEVEL_OUTOF10: 7

## 2021-02-10 ASSESSMENT — PAIN DESCRIPTION - PAIN TYPE: TYPE: ACUTE PAIN

## 2021-02-10 ASSESSMENT — PAIN DESCRIPTION - ONSET
ONSET: ON-GOING
ONSET: ON-GOING

## 2021-02-10 ASSESSMENT — PAIN DESCRIPTION - PROGRESSION
CLINICAL_PROGRESSION: NOT CHANGED

## 2021-02-10 ASSESSMENT — PAIN DESCRIPTION - FREQUENCY: FREQUENCY: CONTINUOUS

## 2021-02-10 ASSESSMENT — PAIN DESCRIPTION - ORIENTATION: ORIENTATION: RIGHT

## 2021-02-10 NOTE — PROGRESS NOTES
[facility]OSPITAL[facility] PROGRESS NOTE  Date: 2/10/2021   Name: Sendy Dietrich   MRN: [id number]   YOB: 1970  Chief Complaint   Patient presents with    Abdominal Pain    Nausea    Diarrhea        Subjective/Interval Hx:     Haven't able to eat for anything the last week, has been throwing up. Not able to drink. Having epigastric pain. Not a diabetic, does not use THC or drink ETOH  GB removed. Szui Henderson did a gastric sleeve surgery. Started having chest pain the last two days. ROS: negative unless mentioned above  Objective:   Physical Exam:   /71   Pulse 76   Temp 98.3 °F (36.8 °C) (Oral)   Resp 16   Ht 5' 4\" (1.626 m)   Wt 280 lb (127 kg)   SpO2 98%   BMI 48.06 kg/m²   CONSTITUTIONAL:  No acute distress  HENT:  NCAT  LUNGS:  cta b/l bs+  CARDIOVASCULAR:  s1s2 rrr  ABDOMEN:  Soft nd, epigastric pain  MUSCULOSKELETAL/Extremities:  No pitting edema, nontender  NEUROLOGIC:  No gross deficits, aao  SKIN:  No gross lesions    Assessment/Plan:     1. Chest pain. ddx includes gastric reflux or cardiac origin  -troponins negative. EKG inverted T wave in lead III, sinus bradycardia. CTA chest: No PE; diffuse bilateral groundglass opacities; multiple small left lower lobe nodules. Stress test and echo pending. Cardiology following. 2. Acute on chronic abdominal pain  -CT abdomen pelvis showed no acute findings. Consult GI. Patient states she has chronic pancreatitis. Consult general surgery. Patient states she is supposed to meet her surgeon for follow-up after having a gastric bypass. 3. Possible breakthrough seizures/pseudoseizures  -patient has a questionable history of pseudoseizures. Had an episode of seizures today during her rapid response. This may have been due to patient not being able to take her oral antiepileptics due to her nausea vomiting. Start IV Keppra. Consult neurology. 4. Lung nodules  -seen on CT chest and appear to be new.   Will need follow-up CT chest in 2 to 3 months. Patient does not present any symptoms suggestive of pulmonary infection. Check CRP and procalcitonin. COVID-19 negative. · Chronic pancreatitis, not in exacerbation  · Chronic pseudoseizures  · Morbid obesity with BMI of 47     Hx of gastric bypass       DVT Prophylaxis: lovenox  Diet: Diet NPO, After Midnight  Diet NPO, After Midnight  Code Status: Full Code      Dispo:  -Need patient tolerated diet, and cleared by cardiology before being able to go home. Paul Lange MD  2/10/2021    Meds:   Meds:    atenolol  25 mg Oral Daily    gabapentin  800 mg Oral TID    levETIRAcetam  1,000 mg Oral BID    levothyroxine  125 mcg Oral Nightly    PARoxetine  50 mg Oral Nightly    sodium chloride flush  10 mL Intravenous 2 times per day    enoxaparin  40 mg Subcutaneous Daily    sodium chloride flush  10 mL Intravenous BID      Infusions:   PRN Meds:     dicyclomine, 10 mg, TID PRN      oxyCODONE-acetaminophen, 1 tablet, Q8H PRN      sodium chloride flush, 10 mL, PRN      promethazine, 12.5 mg, Q6H PRN    Or      ondansetron, 4 mg, Q6H PRN      acetaminophen, 650 mg, Q6H PRN    Or      acetaminophen, 650 mg, Q6H PRN      polyethylene glycol, 17 g, Daily PRN      perflutren lipid microspheres, 1.5 mL, ONCE PRN        Data/Labs:     Recent Labs     02/09/21  1927 02/10/21  0110   WBC 4.6 4.0   HGB 10.2* 10.7*   HCT 32.9* 36.2*    152      Recent Labs     02/09/21  1927 02/10/21  0110    136   K 4.0 3.7    105   CO2 25 23   BUN 18 13   CREATININE 0.7 0.8     Recent Labs     02/09/21 1927   AST 14*   ALT 12   BILITOT 0.1   ALKPHOS 84     No results for input(s): INR in the last 72 hours. Recent Labs     02/09/21  1927 02/10/21  0110 02/10/21  0508   TROPONINT <0.010 <0.010 <0.010     HgBA1c:   Lab Results   Component Value Date    LABA1C 5.4 02/06/2016     CALCIUM:  8.8/23 (02/10 0110)    No intake/output data recorded.   No intake or output data in the 24

## 2021-02-10 NOTE — CARE COORDINATION
Reviewed chart for discharge planning. Spoke with pt, she states she has changed her pcp and is now seeing Dr. Robert Magallanes. Pt still living with her mom, remains ind with ADLs, has pcp/ active insurance/ transportation. Pt denied needs, including HHC. Pt advised CM available if needs arise.

## 2021-02-10 NOTE — PROGRESS NOTES
This nurse was told by transporter that she heard pill bottles in the pt room. This nurse went into the pt room and asked for the medications she had. Pt handed over a green bag that had 4 bottles of meds in it. Pt states she does not take them while here. Tizanidine 4mg quantity 82  Promethazine 25 mg quantity 14  Percocet 5/325 mg quantity 80  Gabapentin 800 mg quantity 84. MOST IMPORTANT     the percocet was filled on 2/6/2021 for 120 pills. 1 pill Every 6 hours as needed. The medications have been logged onto a pharmacy sheet and will be placed in the back med room in blue bin [id number].     Dr Roseann bhatt aware of count

## 2021-02-10 NOTE — SIGNIFICANT EVENT
Rapid response  Called due to patient having seizure-like activity. EKG appeared unremarkable. Glucose was in the 60s. Patient was given an amp of D50. Glucose improved. Patient was given Ativan 1 mg. Seizure-like activity appeared to improve. Patient had not been taking any oral medications including her antiepileptic due to her nausea. Change Keppra to IV. Start IM Phenergan for nausea. Consult neurology.

## 2021-02-10 NOTE — PROGRESS NOTES
Off floor during my rounding  I have seen this patient many times in the past  Hx of non-epileptic seizures v. Epileptic   Continue seizures medications its not uncommon for patient to have seizures when she is admitted.

## 2021-02-10 NOTE — H&P
History and Physical      Name:  Damaris Subramanian /Age/Sex: 1970  (44 y.o. female)   MRN & CSN:  [id number] & [id number] Admission Date/Time: 2021  6:44 PM   Location:  [facility] PCP: Bharat Valladares MD       Hospital Day: 1    Assessment and Plan:   Damaris Subramanian is a 44 y.o.  female  who presents with:    Central chest pain. ddx includes gastric reflux or cardiac origin  Chronic abdominal pain  Chronic pancreatitis, not in exacerbation  Chronic pseudoseizures  Morbid obesity with BMI of 47      Trend trops, ekg  Goes to [facility] for cardiac care  Does NOT see [facility] OP any longer  NPO after MN  Discussed with patient OP follow up for her other chronic illnesses. This admission would focus on her acute issues. Central chest pain with rads straight through to back and right neck  A/w n/v  Hx of gastric bypass    Chest pain x7 days with prog worse      Chronic abd pain and nausea - carol  OUTPATIENT follow up with carol  For abd things - patient agreeable to this plan  Dc to home after cardiac rule out completed    Diet No diet orders on file   DVT Prophylaxis [] Lovenox, []  Heparin, [] SCDs, [] No VTE prophylaxis, patient ambulating   GI Prophylaxis [] PPI, [] H2 Blocker, [] No GI prophylaxis, patient is receiving diet/Tube Feeds   Code Status Prior   Disposition Patient requires continued admission due to pending cardiac work up    Dc to home in AM   MDM [] Low, [x] Moderate,[]  High  Patient's risk as above due to     [] One or more chronic illnesses with mild exacerbation progression    [x] Two or more stable chronic illnesses    [] Undiagnosed new problem with uncertain prognosis    [] Elective major surgery    []Prescription drug management     History of Present Illness:     Chief Complaint:   Damaris Subramanian is a 44 y.o.  female  who presents with central chest pain that started ~7-10 days ago and has progressively worsened over the last week.  She locates the pain to her central chest , radiates through to her back and right neck. Denies alleviating factors or exacerbating factors. Denies having this pain previously and it does not feel like her chronic dyspepsia/gerd. Denies hx of heart work up in the past. Admits to hx of EGD by Dr Laurel Alvarenga and Dr Graeme Ambrose in the past       10-14 point ROS reviewed negative, unless as noted above    Objective:   No intake or output data in the 24 hours ending 02/09/21 2249   Vitals:   Vitals:    02/09/21 1758   BP: (!) 145/70   Pulse: 75   Resp: 14   Temp: 98.8 °F (37.1 °C)   SpO2: 99%     Physical Exam:    GEN Awake female, laying in bed in no apparent distress. Appears given age. EYES Pupils are equally round. No scleral discharge  HENT Atraumatic and symmetric head  NECK No apparent thyromegaly  RESP Symmetric chest movement while on room air. CARDIO/VASC Peripheral pulses equal bilaterally and palpable. No peripheral edema. GI Abdomen is not distended. Rectal exam deferred. Central obesity    Ryder catheter is not present. HEME/LYMPH No petechiae or ecchymoses. MSK Spontaneous movement of BL upper extremities  SKIN Normal coloration, warm, dry. NEURO Cranial nerves appear grossly intact. No shaking or tremors noted   PSYCH Awake, alert. Oriented x4    Past Medical History: PMHx:   Past Medical History:   Diagnosis Date    Acid reflux     Anemia     Anxiety     Arthritis     Hands, Back And Ankles    Bleeding ulcer 2014    \"I Had Ulcers In My Stomach And Colon\"/ per pt on 8/12/2019\"they said recently having some blood in my stomach- in July ( 2019)could not find where coming from \"    Bronchitis Last Episode 2014    Chronic back pain     Chronic pain     Sees Dr. Syeda Pressley COPD (chronic obstructive pulmonary disease) ([facility] [address]     Sees Dr. Cabral Marker Depression     Fibromyalgia Dx 2013    H/O echocardiogram 8/11/15    EF >55%. LA to be at the upper limit of normal in size.  LV hypertrophy with normal LV systolic, but abnormal diastolic function. Normal valvular structures and function.  H/O echocardiogram 2018    EF 55-60%    Hiatal hernia     History of blood transfusion 2015 And     No Reaction To Blood Transfusions Received    [place] (hard of hearing)     Right Ear    Hx of cardiac catheterization 10/24/2010    Angiographically normal coronary arteries w/ normal LV function and wall motion.  Hx of transesophageal echocardiography (PILO) for monitoring 2010    EF 55-60%. WNL. 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 name   Hoa Madrigal cysts     \"they found that I have a kidney cyst but not sure which side\" per pt on 7/15/2020    Lupus ([address] Dx     \"Rheumatoid Lupus\"    MDRO (multiple drug resistant organisms) resistance     4 months ago chest from mediport    Morbid obesity ([address]     MRSA bacteremia     Nausea     \"Most Of The Time\"    Pain management     Sees Dr. Khai George, Once A Month    Pancreatitis chronic Dx     Pneumonia Dx 11-15    Shortness of breath on exertion     Shortness of breath on exertion     Sleep apnea     Has CPAP\"no longer use the cpap since lost weight\"    Staph infection Dx 11-15    Left Foot    Staph infection Dx 11-15    \"Left Foot\"    Teeth missing     Upper And Lower    Thyroid disease     Wears glasses     To Read     PSHx:  has a past surgical history that includes Lung removal, partial (Left, );  section (1991); Foot surgery (Left, Last Done In ); Dental surgery; Cholecystectomy, laparoscopic (6822'Y); other surgical history (1998); other surgical history (Last Done 7-15-16);  Tonsillectomy and adenoidectomy (); Appendectomy (02/1998); Upper gastrointestinal endoscopy (08/27/2018); Lap Band (~2000); Hysterectomy (10/1997); Endoscopy, colon, diagnostic (Last Done In 2018); Colonoscopy (Last Done In 2000's); Cardiac catheterization (10/24/2010); Sleeve Gastrectomy (N/A, 2/12/2019); hiatal hernia repair (N/A, 2/12/2019); Upper gastrointestinal endoscopy (N/A, 4/2/2019); Foot Debridement (Left, 6/16/2019); Upper gastrointestinal endoscopy (N/A, 6/19/2019); Catheter Removal (N/A, 7/16/2019); Upper gastrointestinal endoscopy (N/A, 7/22/2019); INSERTION / REMOVAL / REPLACEMENT VENOUS ACCESS CATHETER (N/A, 8/15/2019); Upper gastrointestinal endoscopy (N/A, 10/14/2019); [address] Surgery (N/A, 1/15/2020); Port Surgery (N/A, 7/6/2020); and Upper gastrointestinal endoscopy (N/A, 7/17/2020). Allergies: Allergies   Allergen Reactions    Aspirin Palpitations     \"My Heart Rate Elevates\"    Shellfish-Derived Products Swelling    Toradol [Ketorolac Tromethamine] Rash    Compazine [Prochlorperazine]     Reglan [Metoclopramide] Itching     Home Medications:   Prior to Admission medications    Medication Sig Start Date End Date Taking? Authorizing Provider   albuterol sulfate  (90 Base) MCG/ACT inhaler Inhale 2 puffs into the lungs 4 times daily 2/8/21   Anthony Goodson MD   promethazine (PHENERGAN) 25 MG tablet Take 1 tablet by mouth every 8 hours as needed for Nausea 1/25/21   Mitchell Kearney MD   ferrous sulfate (IRON 325) 325 (65 Fe) MG tablet Take 1 tablet by mouth daily (with breakfast) 10/23/20   Mitchell Kearney MD   levETIRAcetam (KEPPRA) 1000 MG tablet Take 1 tablet by mouth 2 times daily 7/19/20   Bridgett Chicas MD   dicyclomine (BENTYL) 10 MG capsule Take 1 capsule by mouth 3 times daily as needed (abdominal pain) 4/13/20   Jonah Anaya [facility] Sascha, PA-C   oxyCODONE-acetaminophen (PERCOCET) 5-325 MG per tablet Take 1 tablet by mouth.  Every 4-6 hours PRN    Historical Provider, MD   Cholecalciferol (VITAMIN D3) 5000 units TABS Take 1 tablet by mouth daily    Historical Provider, MD   estradiol (ESTRACE) 0.5 MG tablet Take 0.5 mg by mouth daily    Historical Provider, MD   atenolol (TENORMIN) 25 MG tablet Take 25 mg by mouth daily    Historical Provider, MD   Multiple Vitamin (MVI, BARIATRIC ADVANTAGE MULTI-FORMULA, CHEW TAB) Take 1 tablet by mouth daily 2/22/19   Srinivas Borrero MD   Calcium Citrate-Vitamin D (CALCIUM + VIT D, BARIATRIC ADVANTAGE, CHEWABLE TABLET) Take 1 tablet by mouth 2 times daily 2/22/19   Srinivas Borrero MD   levothyroxine (SYNTHROID) 125 MCG tablet Take 1 tablet by mouth Daily  Patient taking differently: Take 125 mcg by mouth nightly  8/8/18   Yesi Simental MD   gabapentin (NEURONTIN) 800 MG tablet Take 800 mg by mouth 3 times daily    Historical Provider, MD   PARoxetine (PAXIL) 30 MG tablet Take 50 mg by mouth nightly     Historical Provider, MD     FHx: family history includes Arthritis in her mother; Asthma in her father and son; Cancer in her father; Diabetes in her father and mother; Heart Disease in her mother; High Blood Pressure in her brother, father, and mother; High Cholesterol in her mother; Lupus in her daughter; Obesity in her mother; Other in her daughter; Vision Loss in her mother and son.   SHx:   Social History     Socioeconomic History    Marital status:      Spouse name: None    Number of children: None    Years of education: None    Highest education level: None   Occupational History    None   Social Needs    Financial resource strain: None    Food insecurity     Worry: None     Inability: None    Transportation needs     Medical: None     Non-medical: None   Tobacco Use    Smoking status: Never Smoker    Smokeless tobacco: Never Used   Substance and Sexual Activity    Alcohol use: No     Alcohol/week: 0.0 standard drinks    Drug use: No    Sexual activity: Not Currently   Lifestyle    Physical activity     Days per week: None     Minutes per session: None    Stress: None   Relationships    Social connections     Talks on phone: None     Gets together: None     Attends Gnosticism service: None     Active member of club or organization: None     Attends meetings of clubs or organizations: None     Relationship status: None    Intimate partner violence     Fear of current or ex partner: None     Emotionally abused: None     Physically abused: None     Forced sexual activity: None   Other Topics Concern    None   Social History Narrative    None       Medications:   Medications:    sodium chloride flush  10 mL Intravenous BID      Infusions:   PRN Meds:        Electronically signed by Kelvin Sarkar DO on 2/9/2021 at 10:49 PM

## 2021-02-10 NOTE — CARE COORDINATION
Patient identified as potential readmission. Last admission 1/10-1/13 for abdominal pain. Patient here today for abdominal pain. CM reviewed previous patient documentation. No CM needs noted. Patient c/o persistent chest pain despite Morphine administration while in the emergency department. Patient is requiring readmission and there were no alternatives to admission to explore at this time.

## 2021-02-10 NOTE — ED PROVIDER NOTES
EKG Interpretation    Interpreted by emergency department physician    Rhythm: sinus bradycardia  Rate: 50-60  Axis: left  Ectopy: none  Conduction: normal  ST Segments: normal  T Waves: normal  Q Waves: none    Clinical Impression: Sinus bradycardia with left ventricular hypertrophy    Jake Muñiz MD  02/10/21 0316

## 2021-02-10 NOTE — PLAN OF CARE
Problem: Pain:  Goal: Pain level will decrease  Description: Pain level will decrease  Outcome: Ongoing  Goal: Control of acute pain  Description: Control of acute pain  Outcome: Ongoing  Goal: Control of chronic pain  Description: Control of chronic pain  Outcome: Ongoing     Problem: Falls - Risk of:  Goal: Will remain free from falls  Description: Will remain free from falls  Outcome: Ongoing  Goal: Absence of physical injury  Description: Absence of physical injury  Outcome: Ongoing     Problem: Activity:  Goal: Risk for activity intolerance will decrease  Description: Risk for activity intolerance will decrease  Outcome: Ongoing     Problem: Bowel/Gastric:  Goal: Bowel function will improve  Description: Bowel function will improve  Outcome: Ongoing  Goal: Diagnostic test results will improve  Description: Diagnostic test results will improve  Outcome: Ongoing  Goal: Occurrences of nausea will decrease  Description: Occurrences of nausea will decrease  Outcome: Ongoing  Goal: Occurrences of vomiting will decrease  Description: Occurrences of vomiting will decrease  Outcome: Ongoing     Problem:  Activity:  Goal: Risk for activity intolerance will decrease  Description: Risk for activity intolerance will decrease  Outcome: Ongoing     Problem: Fluid Volume:  Goal: Maintenance of adequate hydration will improve  Description: Maintenance of adequate hydration will improve  Outcome: Ongoing     Problem: Health Behavior:  Goal: Ability to state signs and symptoms to report to health care provider will improve  Description: Ability to state signs and symptoms to report to health care provider will improve  Outcome: Ongoing     Problem: Physical Regulation:  Goal: Complications related to the disease process, condition or treatment will be avoided or minimized  Description: Complications related to the disease process, condition or treatment will be avoided or minimized  Outcome: Ongoing  Goal: Ability to maintain clinical measurements within normal limits will improve  Description: Ability to maintain clinical measurements within normal limits will improve  Outcome: Ongoing     Problem: Sensory:  Goal: Pain level will decrease  Description: Pain level will decrease  Outcome: Ongoing  Goal: Ability to identify factors that increase the pain will improve  Description: Ability to identify factors that increase the pain will improve  Outcome: Ongoing  Goal: Ability to notify healthcare provider of pain before it becomes unmanageable or unbearable will improve  Description: Ability to notify healthcare provider of pain before it becomes unmanageable or unbearable will improve  Outcome: Ongoing

## 2021-02-10 NOTE — ED PROVIDER NOTES
symptoms, cough, constipation, melena, hematochezia, hematemesis, concern for pregnancy. REVIEW OF SYSTEMS    Constitutional:  Denies fever, chills  HENT:  Denies sore throat or ear pain   Cardiovascular:  + chest pain; Denies palpitations or swelling   Respiratory: See HPI  GI:  See HPI above  : No hematuria, urinary urgency, urinary frequency, dysuria. No vaginal symptoms. Musculoskeletal:  Denies back pain or groin pain or masses. No pain or swelling of extremities. Skin:  Denies rash  Neurologic:  See HPI; Denies headache, focal weakness or sensory changes, confusion, memory loss, numbness, weakness, tingling, vision changes, hearing changes, speech changes, gait changes  Endocrine:  Denies polyuria or polydypsia   Lymphatic:  Denies swollen glands     All other review of systems are negative  See HPI and nursing notes for additional information     PAST MEDICAL & SURGICAL HISTORY    Past Medical History:   Diagnosis Date    Acid reflux     Anemia     Anxiety     Arthritis     Hands, Back And Ankles    Bleeding ulcer 2014    \"I Had Ulcers In My Stomach And Colon\"/ per pt on 8/12/2019\"they said recently having some blood in my stomach- in July ( 2019)could not find where coming from \"    Bronchitis Last Episode 2014    Chronic back pain     [facility]     [facility]    COPD (chronic obstructive pulmonary disease) ([facility]     Sees Dr. Ferdinand Moralez    Depression     Fibromyalgia Dx 2013    H/O echocardiogram 8/11/15    EF >55%. LA to be at the upper limit of normal in size. LV hypertrophy with normal LV systolic, but abnormal diastolic function. Normal valvular structures and function.      H/O echocardiogram 08/30/2018    EF 55-60%    Hiatal hernia     History of blood transfusion 09/2015 And 2018    No Reaction To Blood Transfusions Received    [place] (hard of hearing)     Right Ear    Hx of cardiac catheterization 10/24/2010    Angiographically normal coronary arteries w/ normal LV function and wall motion.  Hx of transesophageal echocardiography (PILO) for monitoring 12/2/2010    EF 55-60%. WNL. 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 name    Kidney cysts     \"they found that I have a kidney cyst but not sure which side\" per pt on 7/15/2020    Lupus ([address] Dx 2013    \"Rheumatoid Lupus\"    MDRO (multiple drug resistant organisms) resistance     4 months ago chest from mediport    Morbid obesity ([address]     MRSA bacteremia     Nausea     \"Most Of The Time\"    Pain management     Sees Dr. Lizbeth Key, Once A Month    Pancreatitis chronic Dx 2001    Pneumonia Dx 11-15    Shortness of breath on exertion     Shortness of breath on exertion     Sleep apnea     Has CPAP\"no longer use the cpap since lost weight\"    Staph infection Dx 11-15    Left Foot    Staph infection Dx 11-15    \"Left Foot\"    Teeth missing     Upper And Lower    Thyroid disease     Wears glasses     To Read     Past Surgical History:   Procedure Laterality Date    APPENDECTOMY  02/1998    Done When Tubes And Ovaries Were Removed    CARDIAC CATHETERIZATION  10/24/2010    CATHETER REMOVAL N/A 7/16/2019    PORT REMOVAL performed by Kori Cárdenas MD at [address] [facility]  08/27/1991    CHOLECYSTECTOMY, LAPAROSCOPIC  1990's    COLONOSCOPY  Last Done In 2000's    DENTAL SURGERY      Teeth Extracted In Past    ENDOSCOPY, COLON, DIAGNOSTIC  Last Done In 2018    FOOT DEBRIDEMENT Left 6/16/2019    FIRST METATARSAL DEBRIDEMENT INCISION AND DRAINAGE. EXCISION OF ULCER.  RESECTION OF BONE. 1ST METATARSAL POWER LAVAGE AND BONE CEMENT performed by Serafin Capone DPM at [address] Left Last Done In 2016     Dr. Norma Ramsey, \" About 6 Surgeries Done Because Of Staph Infection\"    HIATAL HERNIA REPAIR N/A 2/12/2019    HERNIA HIATAL LAPAROSCOPIC ROBOTIC performed by Shanita Tinoco MD at [place] [address]  10/1997    Partial Abdominal Hysterectomy    INSERTION / REMOVAL / REPLACEMENT VENOUS ACCESS CATHETER N/A 8/15/2019    PORT INSERTION performed by Shanita Tinoco MD at [address] [facility]    removed after 6 months    LUNG REMOVAL, PARTIAL Left 2008    Benign    OTHER SURGICAL HISTORY  02/1998    \"Tubes And Ovaries Removed, Appendectomy Also Done\"    OTHER SURGICAL HISTORY  Last Done 7-15-16    Mediport Insertion \"Total Of Six Done, Removed Last Mediport In 2014\"    PORT SURGERY N/A 1/15/2020    PORT REMOVAL performed by Shanita Tinoco MD at [address] [place] [address] N/A 7/6/2020    PORT INSERTION performed by Shanita Tinoco MD at [address] [place] [address] N/A 2/12/2019    GASTRECTOMY SLEEVE LAPAROSCOPIC ROBOTIC performed by Shanita Tinoco MD at [address] [place] [address]  08/27/2018    UPPER GASTROINTESTINAL ENDOSCOPY N/A 4/2/2019    EGD CONTROL HEMORRHAGE with epi injection at bleeding site performed by Shanita Tinoco MD at [address] [facility] 6/19/2019    EGD DIAGNOSTIC ONLY performed by Shanita Tinoco MD at [place] [address] N/A 7/22/2019    EGD DIAGNOSTIC ONLY performed by Margie Coates MD at [address] [facility] 10/14/2019    EGD DIAGNOSTIC ONLY performed by Shanita Tinoco MD at [place] [address] N/A 7/17/2020    EGJ DILATATION BALLOON WITH 18-20 MM BALLOON, DILATED TO 20 MM. performed by Shanita Tinoco MD at \[facility]\""    Allergies   Allergen Reactions    Aspirin Palpitations     \"My Heart Rate Elevates\"    Shellfish-Derived Products Swelling    Toradol [Ketorolac Tromethamine] Rash    Compazine [Prochlorperazine]     Reglan [Metoclopramide] Itching       SOCIAL AND FAMILY HISTORY    Social History     Socioeconomic History    Marital status:      Spouse name: None    Number of children: None    Years of education: None    Highest education level: None   Occupational History    None   Social Needs    Financial resource strain: None    Food insecurity     Worry: None     Inability: None    Transportation needs     Medical: None     Non-medical: None   Tobacco Use    Smoking status: Never Smoker    Smokeless tobacco: Never Used   Substance and Sexual Activity    Alcohol use: No     Alcohol/week: 0.0 standard drinks    Drug use: No    Sexual activity: Not Currently   Lifestyle    Physical activity     Days per week: None     Minutes per session: None    Stress: None   Relationships    Social connections     Talks on phone: None     Gets together: None     Attends [employer] service: None     Active member of club or organization: None     Attends meetings of clubs or organizations: None     Relationship status: None    Intimate partner violence     Fear of current or ex partner: None     Emotionally abused: None     Physically abused: None     Forced sexual activity: None   Other Topics Concern    None   Social History Narrative    None     Family History   Problem Relation Age of Onset    Diabetes Mother         \"Borderline Diabetes\"    High Blood Pressure Mother     Obesity Mother     Arthritis Mother     Heart Disease Mother     High Cholesterol Mother     Vision Loss Mother     Diabetes Father     High Blood Pressure Father     Asthma Father     Cancer Father         prostate cancer    High Blood Pressure Brother     Asthma Son     Vision Loss Son     Lupus Daughter     Other Daughter         \"Alot Of Female Problems\"       PHYSICAL EXAM    VITAL SIGNS: BP 139/71   Pulse 76   Temp 98.3 °F (36.8 °C) (Oral)   Resp 16   Ht 5' 4\" (1.626 m)   Wt 280 lb (127 kg)   SpO2 98%   BMI 48.06 kg/m²   Constitutional:  Well developed, well nourished. No distress  Eyes:  Sclera nonicteric, conjunctiva moist  HENT:  Atraumatic. PERRL. EOMI. Moist mucus membranes. Neck/Lymphatics: supple, no JVD, no swollen nodes  Respiratory:  No retractions, no accessory muscle use, normal breath sounds   Cardiovascular:  normal rate, normal rhythm, no murmurs. No chest wall tenderness palpation. GI:    No gross discoloration.       -no Rudy's sign (periumbilical ecchymosis)       -no Grey-Armstrong's sign (flank ecchymosis)    Bowel sounds present, no audible bruits. Soft, no distention, no guarding, no rigidity,   + Epigastric abdominal tenderness to palpation-no other abdominal tenderness palpation on exam, no rebound tenderness, no palpable pulsatile masses,   No McBurney's point tenderness   Negative Rovsing sign    Negative Champion's sign. Back:  No CVA tenderness to percussion.   Musculoskeletal:  No edema, no deformity  Vascular: DP pulses 2+ equal bilaterally  Integument: No rash, dry skin    Neurologic:    - Alert & oriented person, place, time, and situation, no speech difficulties or slurring.  - No obvious gross motor deficits  - Cranial nerves 2-12 grossly intact  - Negative meningeal signs.  - Sensation intact to light touch  - Strength 5/5 in upper and lower extremities bilaterally  - Light touch sensation intact throughout.  - No truncal ataxia    Psychiatric: Cooperative, pleasant affect       LABS:  Results for orders placed or performed during the hospital encounter of 02/09/21   CBC Auto Differential   Result Value Ref Range    WBC 4.6 4.0 - 10.5 K/CU MM    RBC 4.01 (L) 4.2 - 5.4 M/CU MM    Hemoglobin 10.2 (L) 12.5 - 16.0 GM/DL    Hematocrit 32.9 (L) 37 - 47 %    MCV 82.0 78 - 100 FL    MCH 25.4 (L) 27 - 31 PG    MCHC 31.0 (L) 32.0 - 36.0 %    RDW 14.1 11.7 - 14.9 % Platelets 336 156 - 720 K/CU MM    MPV 10.4 7.5 - 11.1 FL    Differential Type AUTOMATED DIFFERENTIAL     Segs Relative 48.3 36 - 66 %    Lymphocytes % 41.3 24 - 44 %    Monocytes % 6.6 (H) 0 - 4 %    Eosinophils % 3.1 (H) 0 - 3 %    Basophils % 0.7 0 - 1 %    Segs Absolute 2.2 K/CU MM    Lymphocytes Absolute 1.9 K/CU MM    Monocytes Absolute 0.3 K/CU MM    Eosinophils Absolute 0.1 K/CU MM    Basophils Absolute 0.0 K/CU MM    Nucleated RBC % 0.0 %    Total Nucleated RBC 0.0 K/CU MM    Total Immature Neutrophil 0.00 K/CU MM    Immature Neutrophil % 0.0 0 - 0.43 %   Comprehensive Metabolic Panel w/ Reflex to MG   Result Value Ref Range    Sodium 136 135 - 145 MMOL/L    Potassium 4.0 3.5 - 5.1 MMOL/L    Chloride 103 99 - 110 mMol/L    CO2 25 21 - 32 MMOL/L    BUN 18 6 - 23 MG/DL    CREATININE 0.7 0.6 - 1.1 MG/DL    Glucose 86 70 - 99 MG/DL    Calcium 9.2 8.3 - 10.6 MG/DL    Albumin 3.7 3.4 - 5.0 GM/DL    Total Protein 6.7 6.4 - 8.2 GM/DL    Total Bilirubin 0.1 0.0 - 1.0 MG/DL    ALT 12 10 - 40 U/L    AST 14 (L) 15 - 37 IU/L    Alkaline Phosphatase 84 40 - 129 IU/L    GFR Non-African American >60 >60 mL/min/1.73m2    GFR African American >60 >60 mL/min/1.73m2    Anion Gap 8 4 - 16   Lipase   Result Value Ref Range    Lipase 17 13 - 60 IU/L   Urinalysis   Result Value Ref Range    Color, UA YELLOW YELLOW    Clarity, UA CLEAR CLEAR    Glucose, Urine NEGATIVE NEGATIVE MG/DL    Bilirubin Urine NEGATIVE NEGATIVE MG/DL    Ketones, Urine NEGATIVE NEGATIVE MG/DL    Specific Gravity, UA 1.015 1.001 - 1.035    Blood, Urine NEGATIVE NEGATIVE    pH, Urine 6.0 5.0 - 8.0    Protein, UA NEGATIVE NEGATIVE MG/DL    Urobilinogen, Urine NEGATIVE 0.2 - 1.0 MG/DL    Nitrite Urine, Quantitative NEGATIVE NEGATIVE    Leukocyte Esterase, Urine NEGATIVE NEGATIVE    RBC, UA <1 0 - 6 /HPF    WBC, UA 1 0 - 5 /HPF    Bacteria, UA NEGATIVE NEGATIVE /HPF    Squam Epithel, UA 2 /HPF    Mucus, UA RARE (A) NEGATIVE HPF    Trichomonas, UA NONE SEEN Diffuse bilateral ground-glass pulmonary opacities may represent a   combination of respiratory motion and atelectasis. Underlying mild pulmonary   edema or infection is difficult to entirely exclude. 3.  Multiple small left lower lobe nodular opacities, the largest measuring   up to 9 mm. Findings were not present on the prior study from January 10,   2021 and could represent developing infection. Follow-up chest CT in 2-3   months may be helpful to document improvement and evaluate for malignant   potential.                  ED COURSE & MEDICAL DECISION MAKING       Vital signs and nursing notes reviewed during ED course. I have independently evaluated this patient. Supervising physician present in the Emergency Department, available for consultation, throughout entirety of  patient care. Patient presents as above which prompted workup. While in the ED today, labs, labs, EKG, and imaging were obtained. No marked leukocytosis or leukopenia. There is mild anemia of hemoglobin of 10.2. CMP without emergent findings. Lipase within normal limits- low clinical suspicion for pancreatitis. Troponin is negative. Urine does not appear infected. Initial abdominal exam and repeat abdominal exam are without peritoneal signs. Patient treated with IV Zofran and morphine as well as IV fluids and IM Phenergan with resolution of emesis but continues to have abdominal discomfort. CT of the abdomen pelvis as well as CTA pulmonary obtained today given patient's chest pain in the context of recent hospitalization and her shortness of breath to rule out PE.  CT abdomen pelvis without acute findings per radiologist read. CTA pulmonary with contrast obtained reveals no acute pulmonary thromboembolic disease. There are diffuse bilateral groundglass opacities seen likely from respiratory motion atelectasis but patient I discussed possibility of mild pulmonary edema.   Patient clinically does not have symptoms of respiratory infection as she does not have cough, nasal congestion, chest congestion. There are also multiple nodular opacity seen the patient I discussed today. COVID-19 test ordered given groundglass opacities and is negative. Patient's HEART score is 4 and therefore will admit for ACS rule out. Uncertain of exact etiology of patient's abdominal pain as well as nausea, vomiting, and diarrhea but the symptoms are similar to patient's prior episodes. Labs and imaging for abdominal pain at this time are reassuring. Despite patient having 1 week of emesis and diarrhea she is without electrolyte disturbances present which is reassuring. I consulted with hospitalist, Dr. Tayla Robb, and we discussed the patient's case. She agrees to admit the patient at this time for further evaluation and care. All pertinent Lab data and radiographic results reviewed with patient at bedside. The patient and/or the family were informed of the results of any tests/labs/imaging, the treatment plan, and time was allotted to answer questions. Diagnosis, disposition, and treatment plan reviewed in detail with patient. Patient understands and agrees to admission at this time. In consideration of current COVID19 pandemic, with effort to minimize unnecessary provider exposure, this patient was seen at bedside by me independently. However, in compliance with current hospital KEN/ED protocol, prior to admission I did discuss this patient case with emergency department physician, Dr. Barrie Kuhn. Clinical  IMPRESSION    1. Chest pain, unspecified type    2. Nausea vomiting and diarrhea    3. Abdominal pain, epigastric    4. Ground glass opacity present on imaging of lung    5.  Lung nodule        Disposition:  Admission    Comment: Please note this report has been produced using speech recognition software and may contain errors related to that system including errors in grammar, punctuation, and spelling, as well as words and phrases that may be inappropriate. If there are any questions or concerns please feel free to contact the dictating provider for clarification.         Mauro Cutler PA-C  02/10/21 0136

## 2021-02-10 NOTE — PROGRESS NOTES
Rapid response called. As I was assisting pt to standing from OOB to BR. Pt grabbed me and stated she was going to have a seizure. I sat pt on bed and she began shaking.

## 2021-02-11 ENCOUNTER — APPOINTMENT (OUTPATIENT)
Dept: CT IMAGING | Age: 51
DRG: 392 | End: 2021-02-11
Payer: COMMERCIAL

## 2021-02-11 LAB
EKG ATRIAL RATE: 57 BPM
EKG ATRIAL RATE: 59 BPM
EKG ATRIAL RATE: 76 BPM
EKG DIAGNOSIS: NORMAL
EKG P AXIS: 38 DEGREES
EKG P AXIS: 41 DEGREES
EKG P AXIS: 56 DEGREES
EKG P-R INTERVAL: 176 MS
EKG P-R INTERVAL: 188 MS
EKG P-R INTERVAL: 190 MS
EKG Q-T INTERVAL: 382 MS
EKG Q-T INTERVAL: 414 MS
EKG Q-T INTERVAL: 434 MS
EKG QRS DURATION: 82 MS
EKG QRS DURATION: 86 MS
EKG QRS DURATION: 88 MS
EKG QTC CALCULATION (BAZETT): 409 MS
EKG QTC CALCULATION (BAZETT): 422 MS
EKG QTC CALCULATION (BAZETT): 429 MS
EKG R AXIS: -12 DEGREES
EKG R AXIS: -13 DEGREES
EKG R AXIS: -16 DEGREES
EKG T AXIS: 14 DEGREES
EKG T AXIS: 26 DEGREES
EKG T AXIS: 39 DEGREES
EKG VENTRICULAR RATE: 57 BPM
EKG VENTRICULAR RATE: 59 BPM
EKG VENTRICULAR RATE: 76 BPM
HIGH SENSITIVE C-REACTIVE PROTEIN: 59.4 MG/L
KEPPRA: <2 UG/ML (ref 12–46)
PROCALCITONIN: 0.15

## 2021-02-11 PROCEDURE — 96372 THER/PROPH/DIAG INJ SC/IM: CPT

## 2021-02-11 PROCEDURE — 93010 ELECTROCARDIOGRAM REPORT: CPT | Performed by: INTERNAL MEDICINE

## 2021-02-11 PROCEDURE — 2580000003 HC RX 258: Performed by: STUDENT IN AN ORGANIZED HEALTH CARE EDUCATION/TRAINING PROGRAM

## 2021-02-11 PROCEDURE — 84145 PROCALCITONIN (PCT): CPT

## 2021-02-11 PROCEDURE — 6360000002 HC RX W HCPCS: Performed by: STUDENT IN AN ORGANIZED HEALTH CARE EDUCATION/TRAINING PROGRAM

## 2021-02-11 PROCEDURE — 6370000000 HC RX 637 (ALT 250 FOR IP): Performed by: STUDENT IN AN ORGANIZED HEALTH CARE EDUCATION/TRAINING PROGRAM

## 2021-02-11 PROCEDURE — C9113 INJ PANTOPRAZOLE SODIUM, VIA: HCPCS | Performed by: SPECIALIST

## 2021-02-11 PROCEDURE — G0378 HOSPITAL OBSERVATION PER HR: HCPCS

## 2021-02-11 PROCEDURE — 96366 THER/PROPH/DIAG IV INF ADDON: CPT

## 2021-02-11 PROCEDURE — 70450 CT HEAD/BRAIN W/O DYE: CPT

## 2021-02-11 PROCEDURE — 6360000002 HC RX W HCPCS: Performed by: HOSPITALIST

## 2021-02-11 PROCEDURE — 6370000000 HC RX 637 (ALT 250 FOR IP): Performed by: HOSPITALIST

## 2021-02-11 PROCEDURE — 96375 TX/PRO/DX INJ NEW DRUG ADDON: CPT

## 2021-02-11 PROCEDURE — 86141 C-REACTIVE PROTEIN HS: CPT

## 2021-02-11 PROCEDURE — 96376 TX/PRO/DX INJ SAME DRUG ADON: CPT

## 2021-02-11 PROCEDURE — 99232 SBSQ HOSP IP/OBS MODERATE 35: CPT | Performed by: INTERNAL MEDICINE

## 2021-02-11 PROCEDURE — 6360000002 HC RX W HCPCS: Performed by: SPECIALIST

## 2021-02-11 PROCEDURE — 99222 1ST HOSP IP/OBS MODERATE 55: CPT | Performed by: SURGERY

## 2021-02-11 PROCEDURE — 36415 COLL VENOUS BLD VENIPUNCTURE: CPT

## 2021-02-11 PROCEDURE — 94761 N-INVAS EAR/PLS OXIMETRY MLT: CPT

## 2021-02-11 PROCEDURE — 99205 OFFICE O/P NEW HI 60 MIN: CPT | Performed by: NURSE PRACTITIONER

## 2021-02-11 PROCEDURE — 2580000003 HC RX 258: Performed by: HOSPITALIST

## 2021-02-11 RX ORDER — HYDROXYZINE PAMOATE 25 MG/1
50 CAPSULE ORAL EVERY 6 HOURS PRN
Status: DISCONTINUED | OUTPATIENT
Start: 2021-02-11 | End: 2021-02-13 | Stop reason: HOSPADM

## 2021-02-11 RX ORDER — OXYCODONE HYDROCHLORIDE AND ACETAMINOPHEN 5; 325 MG/1; MG/1
1 TABLET ORAL EVERY 6 HOURS PRN
Status: DISCONTINUED | OUTPATIENT
Start: 2021-02-11 | End: 2021-02-13 | Stop reason: HOSPADM

## 2021-02-11 RX ORDER — MORPHINE SULFATE 2 MG/ML
2 INJECTION, SOLUTION INTRAMUSCULAR; INTRAVENOUS EVERY 6 HOURS PRN
Status: DISCONTINUED | OUTPATIENT
Start: 2021-02-11 | End: 2021-02-13 | Stop reason: HOSPADM

## 2021-02-11 RX ORDER — PANTOPRAZOLE SODIUM 40 MG/10ML
40 INJECTION, POWDER, LYOPHILIZED, FOR SOLUTION INTRAVENOUS DAILY
Status: DISCONTINUED | OUTPATIENT
Start: 2021-02-11 | End: 2021-02-13 | Stop reason: HOSPADM

## 2021-02-11 RX ADMIN — PROMETHAZINE HYDROCHLORIDE 12.5 MG: 25 INJECTION INTRAMUSCULAR; INTRAVENOUS at 04:44

## 2021-02-11 RX ADMIN — MORPHINE SULFATE 2 MG: 2 INJECTION, SOLUTION INTRAMUSCULAR; INTRAVENOUS at 04:44

## 2021-02-11 RX ADMIN — GABAPENTIN 800 MG: 400 CAPSULE ORAL at 10:21

## 2021-02-11 RX ADMIN — ENOXAPARIN SODIUM 40 MG: 100 INJECTION SUBCUTANEOUS at 10:08

## 2021-02-11 RX ADMIN — GABAPENTIN 800 MG: 400 CAPSULE ORAL at 20:11

## 2021-02-11 RX ADMIN — SODIUM CHLORIDE, PRESERVATIVE FREE 10 ML: 5 INJECTION INTRAVENOUS at 13:32

## 2021-02-11 RX ADMIN — PROMETHAZINE HYDROCHLORIDE 12.5 MG: 25 INJECTION INTRAMUSCULAR; INTRAVENOUS at 18:27

## 2021-02-11 RX ADMIN — MORPHINE SULFATE 2 MG: 2 INJECTION, SOLUTION INTRAMUSCULAR; INTRAVENOUS at 14:18

## 2021-02-11 RX ADMIN — ONDANSETRON 4 MG: 2 INJECTION INTRAMUSCULAR; INTRAVENOUS at 14:18

## 2021-02-11 RX ADMIN — HYDROXYZINE PAMOATE 50 MG: 25 CAPSULE ORAL at 18:14

## 2021-02-11 RX ADMIN — LEVETIRACETAM 1000 MG: 100 INJECTION, SOLUTION INTRAVENOUS at 23:47

## 2021-02-11 RX ADMIN — SODIUM CHLORIDE, PRESERVATIVE FREE 10 ML: 5 INJECTION INTRAVENOUS at 10:10

## 2021-02-11 RX ADMIN — MORPHINE SULFATE 2 MG: 2 INJECTION, SOLUTION INTRAMUSCULAR; INTRAVENOUS at 10:09

## 2021-02-11 RX ADMIN — LEVETIRACETAM 1000 MG: 100 INJECTION, SOLUTION INTRAVENOUS at 10:09

## 2021-02-11 RX ADMIN — PANTOPRAZOLE SODIUM 40 MG: 40 INJECTION, POWDER, LYOPHILIZED, FOR SOLUTION INTRAVENOUS at 13:32

## 2021-02-11 RX ADMIN — MORPHINE SULFATE 2 MG: 2 INJECTION, SOLUTION INTRAMUSCULAR; INTRAVENOUS at 20:11

## 2021-02-11 RX ADMIN — SODIUM CHLORIDE, PRESERVATIVE FREE 10 ML: 5 INJECTION INTRAVENOUS at 10:56

## 2021-02-11 RX ADMIN — PAROXETINE 50 MG: 10 TABLET, FILM COATED ORAL at 20:11

## 2021-02-11 RX ADMIN — LEVOTHYROXINE SODIUM 125 MCG: 125 TABLET ORAL at 20:11

## 2021-02-11 RX ADMIN — GABAPENTIN 800 MG: 400 CAPSULE ORAL at 13:32

## 2021-02-11 RX ADMIN — SODIUM CHLORIDE, PRESERVATIVE FREE 10 ML: 5 INJECTION INTRAVENOUS at 20:11

## 2021-02-11 RX ADMIN — PROMETHAZINE HYDROCHLORIDE 12.5 MG: 25 INJECTION INTRAMUSCULAR; INTRAVENOUS at 10:09

## 2021-02-11 RX ADMIN — ATENOLOL 25 MG: 25 TABLET ORAL at 10:21

## 2021-02-11 ASSESSMENT — PAIN SCALES - GENERAL
PAINLEVEL_OUTOF10: 6
PAINLEVEL_OUTOF10: 6
PAINLEVEL_OUTOF10: 7
PAINLEVEL_OUTOF10: 6

## 2021-02-11 NOTE — CONSULTS
[address] [place] [address], [address] [facility]                                  CONSULTATION    PATIENT NAME: Mohinder Clinton                  :        1970  MED REC NO:   [id number]                          ROOM:       [id number]  ACCOUNT NO:   [de-identified]                           ADMIT DATE: 2021  PROVIDER:     Darrin Coello MD    CONSULT DATE:  2021    PRIMARY CARE PROVIDER:  Alex Lake M.D.    [address]:  1. Intractable right upper quadrant abdominal pain. 2.  History of chest pain. HISTORY OF PRESENT ILLNESS:  The patient is a 61-year-old white female,  patient known to me from her multiple previous hospitalizations, with  past medical history significant for morbid obesity, status post sleeve  gastrectomy by Dr. Malissa Chicas, lupus, hypertension, depression/anxiety,  gastroesophageal reflux disease, peptic ulcer disease, hypothyroidism,  history of gallstones, status post cholecystectomy complicated by bile  duct injury with at least 7 or 8 ERCPs in [place], [place] and  extensive GI workup done for sphincter of Oddi dysfunction for which the  patient had biliary and pancreatic stents placed in the past, which were  subsequently removed, history of acute recurrent/chronic pancreatitis,  anemia, chronic back pain, fibromyalgia, recurrent upper GI bleeding  requiring multiple EGDs, history of seizure disorder and chronic pain  syndrome. The patient was admitted to the hospital on the 2021  with chest pain. The patient also has been complaining of upper  abdominal pain along with nausea and vomiting. There is no history of  hematemesis, melena or hematochezia. The patient's blood workup on  admission comprised of chem profile, LFTs and CBC, which was essentially  unremarkable, apart from hemoglobin of 10.2.   The patient did have a CAT  scan done of the abdomen and pelvis on the 2021 and postsurgical  changes were noted without any complications. The patient is  hemodynamically stable at present and is on IV Protonix. The patient has had an extensive GI workup done in the past including  multiple EGDs, at least 3 EGDs done by me, last EGD was on the  07/22/2019 with postsurgical changes noted from previous sleeve  gastrectomy and gastritis with old blood noted. There was no evidence  of active peptic ulcer disease. The patient also has been followed up  on a regular basis by Dr. Urbano Seip, who has performed at least 4 EGDs,  first EGD was on the 08/27/2018, which showed postsurgical changes from  sleeve gastrectomy along with mild gastritis, second EGD on the  03/12/2019 for upper GI bleeding was unremarkable, third EGD on the  04/02/2019, showed bleeding from distal staple line, which was injected  with epinephrine solution and fourth EGD was on the 06/19/2019 and again  was unremarkable. The patient did have a colonoscopy done in the past  as well. The patient is also being followed up by Dr. Marcy Kruse for pain  management. The patient also admitted at [facility] in 2020 for a couple  of days for chronic right upper quadrant abdominal pain and possible  ERCP, but no ERCP was performed and the patient was discharged home to  be followed up locally. The patient did have an MRCP done on the  09/25/2020, which was unremarkable. The patient is hemodynamically  stable. REVIEW OF SYSTEMS:  CENTRAL NERVOUS SYSTEM:  The patient denies headache or focal  sensorimotor symptoms. However, there is history of pseudoseizures. CARDIOVASCULAR SYSTEM:  The patient complains of chest pain upon  admission, mild shortness of breath and leg swelling. GENITOURINARY SYSTEM:  No history of dysuria, pyuria, or hematuria. MUSCULOSKELETAL SYSTEM:  The patient complains of generalized weakness. RESPIRATORY SYSTEM:  No history of cough, hemoptysis, fever, or chills.     PAST MEDICAL HISTORY: Significant for history of morbid obesity, status  post sleeve gastrectomy done by Dr. Griselda Stock, history of lupus,  hypertension, depression/anxiety, gastroesophageal reflux disease,  peptic ulcer disease, hypothyroidism, history of gallstones, status post  cholecystectomy complicated by bile duct injury with at least 7 or 8  ERCPs done in [place], [place] with extensive workup done for  sphincter of Oddi dysfunction for which the patient had  biliary/pancreatic stents placed, which were subsequently removed, also  history of acute/recurrent/chronic pancreatitis, anemia, chronic back  pain, fibromyalgia, history of recurrent episodes of upper GI bleeding  requiring multiple EGDs, seizure disorder, and chronic pain syndrome. FAMILY HISTORY:  The patient,s father was diagnosed with carcinoma of  the prostate, maternal aunt with carcinoma of the breast, maternal  grandmother with carcinoma of the <stomachL and maternal grandfather  with carcinoma of the lung. MEDICATIONS:  Please refer to chart. SOCIOECONOMIC HISTORY:  No history of EtOH abuse. The patient does not  smoke cigarettes. PAST SURGICAL HISTORY:  The patient has had total abdominal  hysterectomy, cholecystectomy, MediPort placed, which got infected and  was removed by Dr. Griselda Stock, appendectomy, tonsillectomy, partial removal  of the left lung for benign disease, dental surgery, , multiple  EGDs and at least one colonoscopy by Dr. Mick Casarez approximately 7 years ago  and also has had 7 or 8 ERCPs done in [place], [place] for common  bile duct stone/sphincter of Oddi dysfunction and placement of  biliary/pancreatic stents, which were subsequently removed. The patient  has had two EGDs done by Dr. Armida Wallace, 3 by me and 4 by Dr. Griselda Stock at  least and the patient also has had surgery done on left foot for  osteomyelitis. ALLERGIES:  The patient is allergic to ASPIRIN, SHELLFISH, TORADOL,  COMPAZINE and REGLAN.     PHYSICAL EXAMINATION:  GENERAL:  Shows a 77-year-old white female who is obese, lying flat in  bed, in no acute distress. She is awake, alert, and oriented and  pleasant to talk with. VITAL SIGNS:  Stable. HEENT:  Shows skull to be atraumatic. NECK:  Supple. CHEST:  Clear. HEART:  S1 and S2 are normal.  ABDOMEN:  Obese with mild tenderness in the upper abdomen. Liver and  spleen are not palpable. There is no guarding or rigidity. RECTAL:  Deferred. CNS:  Shows the patient to be awake, alert, and oriented. There are no  focal sensorimotor signs. MUSCULOSKELETAL:  Shows evidence of degenerative joint disease changes. LABORATORY DATA:  As above mentioned. IMPRESSION:  A 77-year-old white female with multiple comorbidities,  presents with intractable epigastric/right upper quadrant abdominal  pain, rule out gastritis? RECOMMENDATIONS:  1. Agree with present management. 2.  We will start the patient on clear liquid diet and advance as  tolerated. 3.  We will check CBC, chem profile, amylase, lipase, PT, PTT, INR in  a.m.  4.  The patient has been instructed to follow up with her surgical  consultant, Dr. Fredi Alexis, after discharge for intractable right upper  quadrant abdominal pain and possible diagnostic laparoscopy to rule out  adhesions. 5.  The patient also has been instructed to follow up at [facility] for further management as well.         Simona Guerrero MD    D: 02/11/2021 13:41:22       T: 02/11/2021 14:51:03     [email or web address]  Job#: [id number]     Doc#: [id number]    CC:  Ankur Bourgeois MD

## 2021-02-11 NOTE — PROGRESS NOTES
Today's plan:  Stress echo is normal, ok for discharge      Admit Date:  2021    Subjective:ok      Chief complaints on admission  Chief Complaint   Patient presents with    Abdominal Pain    Nausea    Diarrhea         History of present illness:Andre is a 44 y. o.year old who  presents with had concerns including Abdominal Pain, Nausea, and Diarrhea. Past medical history:    has a past medical history of Acid reflux, Anemia, Anxiety, Arthritis, Bleeding ulcer, Bronchitis, Chronic back pain, Chronic pain, COPD (chronic obstructive pulmonary disease) ([address] Depression, Fibromyalgia, H/O echocardiogram, H/O echocardiogram, Hiatal hernia, History of blood transfusion, [place] (hard of hearing), Hx of cardiac catheterization, Hx of transesophageal echocardiography (PILO) for monitoring, HX OTHER MEDICAL, HX OTHER MEDICAL, Hypertension, Kidney cysts, Lupus ([address] MDRO (multiple drug resistant organisms) resistance, Morbid obesity ([facility] [address] MRSA bacteremia, Nausea, Pain management, Pancreatitis chronic, Pneumonia, Shortness of breath on exertion, Shortness of breath on exertion, Sleep apnea, Staph infection, Staph infection, Teeth missing, Thyroid disease, and Wears glasses. Past surgical history:   has a past surgical history that includes Lung removal, partial (Left, );  section (1991); Foot surgery (Left, Last Done In ); Dental surgery; Cholecystectomy, laparoscopic (2316'J); other surgical history (1998); other surgical history (Last Done 7-15-16); Tonsillectomy and adenoidectomy (); Appendectomy (1998); Upper gastrointestinal endoscopy (2018); Lap Band (~); Hysterectomy (10/1997); Endoscopy, colon, diagnostic (Last Done In 2018); Colonoscopy (Last Done In 's); Cardiac catheterization (10/24/2010); Sleeve Gastrectomy (N/A, 2019); hiatal hernia repair (N/A, 2019); Upper gastrointestinal endoscopy (N/A, 2019); Foot Debridement (Left, 2019);  Upper gastrointestinal endoscopy (N/A, 6/19/2019); Catheter Removal (N/A, 7/16/2019); Upper gastrointestinal endoscopy (N/A, 7/22/2019); INSERTION / REMOVAL / REPLACEMENT VENOUS ACCESS CATHETER (N/A, 8/15/2019); Upper gastrointestinal endoscopy (N/A, 10/14/2019);  [address] Surgery (N/A, 1/15/2020); Port Surgery (N/A, 7/6/2020); and Upper gastrointestinal endoscopy (N/A, 7/17/2020). Social History:   reports that she has never smoked. She has never used smokeless tobacco. She reports that she does not drink alcohol or use drugs. Family history:  family history includes Arthritis in her mother; Asthma in her father and son; Cancer in her father; Diabetes in her father and mother; Heart Disease in her mother; High Blood Pressure in her brother, father, and mother; High Cholesterol in her mother; Lupus in her daughter; Obesity in her mother; Other in her daughter; Vision Loss in her mother and son. Allergies   Allergen Reactions    Aspirin Palpitations     \"My Heart Rate Elevates\"    Shellfish-Derived Products Swelling    Toradol [Ketorolac Tromethamine] Rash    Compazine [Prochlorperazine]     Reglan [Metoclopramide] Itching         Objective:   /67   Pulse 66   Temp 98.3 °F (36.8 °C) (Oral)   Resp 17   Ht 5' 4\" (1.626 m)   Wt 278 lb 1.6 oz (126.1 kg)   SpO2 96%   BMI 47.74 kg/m²       Intake/Output Summary (Last 24 hours) at 2/11/2021 1640  Last data filed at 2/11/2021 1332  Gross per 24 hour   Intake 140 ml   Output --   Net 140 ml       TELEMETRY: Sinus     Physical Exam:  Constitutional:  Well developed, Well nourished, No acute distress, Non-toxic appearance. HENT:  Normocephalic, Atraumatic, Bilateral external ears normal, Oropharynx moist, No oral exudates, Nose normal. Neck- Normal range of motion, No tenderness, Supple, No stridor. Eyes:  PERRL, EOMI, Conjunctiva normal, No discharge. Respiratory:  Normal breath sounds, No respiratory distress, No wheezing, No chest tenderness. ,no use of accessory muscles, diaphragm movement is normal  Cardiovascular: (PMI) apex non displaced,no lifts no thrills, no s3,no s4, Normal heart rate, Normal rhythm, No murmurs, No rubs, No gallops. Carotid arteries pulse and amplitude are normal no bruit, no abdominal bruit noted ( normal abdominal aorta ausculation), femoral arteries pulse and amplitude are normal no bruit, pedal pulses are normal  GI:  Bowel sounds normal, Soft, No tenderness, No masses, No pulsatile masses. : External genitalia appear normal, No masses or lesions. No discharge. No CVA tenderness. Musculoskeletal:  Intact distal pulses, No edema, No tenderness, No cyanosis, No clubbing. Good range of motion in all major joints. No tenderness to palpation or major deformities noted. Back- No tenderness. Integument:  Warm, Dry, No erythema, No rash. Lymphatic:  No lymphadenopathy noted. Neurologic:  Alert & oriented x 3, Normal motor function, Normal sensory function, No focal deficits noted.    Psychiatric:  Affect normal, Judgment normal, Mood normal.     Medications:    pantoprazole  40 mg Intravenous Daily    atenolol  25 mg Oral Daily    gabapentin  800 mg Oral TID    levothyroxine  125 mcg Oral Nightly    PARoxetine  50 mg Oral Nightly    sodium chloride flush  10 mL Intravenous 2 times per day    enoxaparin  40 mg Subcutaneous Daily    LORazepam  1 mg Intravenous Once    levetiracetam  1,000 mg Intravenous Q12H    sodium chloride flush  10 mL Intravenous BID       dicyclomine, oxyCODONE-acetaminophen, sodium chloride flush, promethazine **OR** ondansetron, acetaminophen **OR** acetaminophen, polyethylene glycol, promethazine, perflutren lipid microspheres, morphine    Lab Data:  CBC:   Recent Labs     02/09/21  1927 02/10/21  0110   WBC 4.6 4.0   HGB 10.2* 10.7*   HCT 32.9* 36.2*   MCV 82.0 87.0    152     BMP:   Recent Labs     02/09/21  1927 02/10/21  0110 02/10/21  1145    136 135   K 4.0 3.7 4.3    105 105 CO2 25 23 20*   BUN 18 13 10   CREATININE 0.7 0.8 0.7     LIVER PROFILE:   Recent Labs     02/09/21  1927   AST 14*   ALT 12   LIPASE 17   BILITOT 0.1   ALKPHOS 84     PT/INR: No results for input(s): PROTIME, INR in the last 72 hours. APTT: No results for input(s): APTT in the last 72 hours. BNP:  No results for input(s): BNP in the last 72 hours. TROPONIN: @TROPONINI:3@      Assessment:  44 y. o.year old who is admitted for          Plan:  1. Chest pain:stress echo is normal  2. Possible pneumonia vs atectasis on CTA/ recommend antibiotics  3. H/o pancreatitis and abdominal pain: as per medicine  4. COPD:stable  5. Seizure; as per medicine  6. Severe obesity: recommend to lose weight, s/p gastric sleeve sx,   All labs, medications and tests reviewed, continue all other medications of all above medical condition listed as is.       Madalyn Klein MD 2/11/2021 4:40 PM

## 2021-02-11 NOTE — CONSULTS
Neurology Service Consult Note  [facility]  Patient Name: Adenike Toure  : 1970        Subjective:   Reason for consult: \" I had another seizure\"  44 y.o. right-handed female with past medical history listed below presenting to [facility] with complaints of chest pain, patient was on the floor yesterday, she was experiencing 10 out of 10 chest pain then nursing staff observed a reported generalized tonic-clonic seizure. Patient also reports he has been having nausea vomiting reports she has not been able to keep her Keppra down. Per nursing notes around 10:57 AM 1 day prior to this exam she grabbed the nurse who is at the bedside and told her that she was going to have a seizure and then she began shaking. Then she woke up and wanted to go to the bathroom when they got to the bathroom she had another episode of shaking eyes rolled back in her head and aphasia. Happened response was called patient had blood sugar of 60 was given 1 mg of Ativan and patient seizure aborted. Patient did not bite her tongue or urinate on herself. Patient has longstanding history of epileptic versus nonepileptic seizure superimposed on chronic chest pain, headaches and nausea vomiting. She is had multiple endoscopic needs in this hospital as well as stress test.  She is had multiple breakthrough seizures on admissions in the past.    He states to me that she knows her seizures are related to pain and she believes they are pain response. Is on Keppra 1000 twice daily at home she has not missed doses. Except for when she had the nausea vomiting she states she was not keeping it down. She reports has a 4 out of 10 generalized tension headache. States the only other new symptoms is that she has been having blurred vision bilaterally, no diplopia no loss of vision and her headaches have been bad but this is not new for her.       Past Medical History:   Diagnosis Date    Acid reflux     Anemia     Anxiety     Arthritis Hands, Back And Ankles    Bleeding ulcer 2014    \"I Had Ulcers In My Stomach And Colon\"/ per pt on 8/12/2019\"they said recently having some blood in my stomach- in July ( 2019)could not find where coming from \"    Bronchitis Last Episode 2014    Chronic back pain     Chronic pain     Sees Dr. Feliciano Back COPD (chronic obstructive pulmonary disease) ([facility] [address]     Sees Dr. Clint Sanders Depression     Fibromyalgia Dx 2013    H/O echocardiogram 8/11/15    EF >55%. LA to be at the upper limit of normal in size. LV hypertrophy with normal LV systolic, but abnormal diastolic function. Normal valvular structures and function.  H/O echocardiogram 08/30/2018    EF 55-60%    Hiatal hernia     History of blood transfusion 09/2015 And 2018    No Reaction To Blood Transfusions Received    [place] (hard of hearing)     Right Ear    Hx of cardiac catheterization 10/24/2010    Angiographically normal coronary arteries w/ normal LV function and wall motion.  Hx of transesophageal echocardiography (PILO) for monitoring 12/2/2010    EF 55-60%. WNL.    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 name    Kidney cysts     \"they found that I have a kidney cyst but not sure which side\" per pt on 7/15/2020    Lupus ([facility] [address] Dx 2013    \"Rheumatoid Lupus\"    MDRO (multiple drug resistant organisms) resistance     4 months ago chest from mediport    Morbid obesity ([facility] [address]     MRSA bacteremia     Nausea     \"Most Of The Time\"    Pain management     Sees Dr. Syed Ríos, Once A Month    Pancreatitis chronic Dx 2001    Pneumonia Dx 11-15    Shortness of breath on exertion     Shortness of breath on exertion     Sleep apnea Has CPAP\"no longer use the cpap since lost weight\"    Staph infection Dx 11-15    Left Foot    Staph infection Dx 11-15    \"Left Foot\"    Teeth missing     Upper And Lower    Thyroid disease     Wears glasses     To Read    :   Past Surgical History:   Procedure Laterality Date    APPENDECTOMY  02/1998    Done When Tubes And Ovaries Were Removed    CARDIAC CATHETERIZATION  10/24/2010    CATHETER REMOVAL N/A 7/16/2019    PORT REMOVAL performed by Hector Hsu MD at [address]  08/27/1991    CHOLECYSTECTOMY, LAPAROSCOPIC  1990's    COLONOSCOPY  Last Done In 2000's    DENTAL SURGERY      Teeth Extracted In Past    ENDOSCOPY, COLON, DIAGNOSTIC  Last Done In 2018    FOOT DEBRIDEMENT Left 6/16/2019    FIRST METATARSAL DEBRIDEMENT INCISION AND DRAINAGE. EXCISION OF ULCER.  RESECTION OF BONE. 1ST METATARSAL POWER LAVAGE AND BONE CEMENT performed by John Rosa DPM at [address] Left Last Done In 2016     Dr. Tracy Peña, \" About 6 Surgeries Done Because Of Staph Infection\"    HIATAL HERNIA REPAIR N/A 2/12/2019    HERNIA HIATAL LAPAROSCOPIC ROBOTIC performed by Hector Hsu MD at [facility]  10/1997    Partial Abdominal Hysterectomy    INSERTION / REMOVAL / REPLACEMENT VENOUS ACCESS CATHETER N/A 8/15/2019    [facility] [address] [facility]    removed after 6 months    LUNG REMOVAL, PARTIAL Left 2008    Benign    OTHER SURGICAL HISTORY  02/1998    \"Tubes And Ovaries Removed, Appendectomy Also Done\"    OTHER SURGICAL HISTORY  Last Done 7-15-16    Mediport Insertion \"Total Of Six Done, Removed Last Mediport In 2014\"    PORT SURGERY N/A 1/15/2020    PORT REMOVAL performed by Hector Hsu MD at [address] N/A 7/6/2020    PORT INSERTION performed by Hector Hsu MD at [address] [facility] N/A 2/12/2019    GASTRECTOMY SLEEVE LAPAROSCOPIC ROBOTIC performed by Hector Hsu MD at [facility]    UPPER GASTROINTESTINAL ENDOSCOPY  08/27/2018    UPPER GASTROINTESTINAL ENDOSCOPY N/A 4/2/2019    EGD CONTROL HEMORRHAGE with epi injection at bleeding site performed by Leandro Todd MD at [address] [facility] N/A 6/19/2019    EGD DIAGNOSTIC ONLY performed by Leandro Todd MD at [address] [facility] N/A 7/22/2019    EGD DIAGNOSTIC ONLY performed by Nirmal Chowdary MD at [address] [facility] N/A 10/14/2019    EGD DIAGNOSTIC ONLY performed by Leandro Todd MD at [address] [facility] N/A 7/17/2020    EGJ DILATATION BALLOON WITH 18-20 MM BALLOON, DILATED TO 20 MM. performed by Leandro Todd MD at [facility] ENDOSCOPY     Medications:  Scheduled Meds:   atenolol  25 mg Oral Daily    gabapentin  800 mg Oral TID    levothyroxine  125 mcg Oral Nightly    PARoxetine  50 mg Oral Nightly    sodium chloride flush  10 mL Intravenous 2 times per day    enoxaparin  40 mg Subcutaneous Daily    LORazepam  1 mg Intravenous Once    levetiracetam  1,000 mg Intravenous Q12H    sodium chloride flush  10 mL Intravenous BID     Continuous Infusions:  PRN Meds:.dicyclomine, oxyCODONE-acetaminophen, sodium chloride flush, promethazine **OR** ondansetron, acetaminophen **OR** acetaminophen, polyethylene glycol, promethazine, perflutren lipid microspheres, morphine    Allergies   Allergen Reactions    Aspirin Palpitations     \"My Heart Rate Elevates\"    Shellfish-Derived Products Swelling    Toradol [Ketorolac Tromethamine] Rash    Compazine [Prochlorperazine]     Reglan [Metoclopramide] Itching     Social History     Socioeconomic History    Marital status:      Spouse name: Not on file    Number of children: Not on file    Years of education: Not on file    Highest education level: Not on file   Occupational History    Not on file   Social Needs    Financial resource strain: Not on file    Food insecurity     Worry: Not on file     Inability: Not on file   [employer] needs     Medical: Not on file     Non-medical: Not on file   Tobacco Use    Smoking status: Never Smoker    Smokeless tobacco: Never Used   Substance and Sexual Activity    Alcohol use: No     Alcohol/week: 0.0 standard drinks    Drug use: No    Sexual activity: Not Currently   Lifestyle    Physical activity     Days per week: Not on file     Minutes per session: Not on file    Stress: Not on file   Relationships    Social connections     Talks on phone: Not on file     Gets together: Not on file     Attends [employer] service: Not on file     Active member of club or organization: Not on file     Attends meetings of clubs or organizations: Not on file     Relationship status: Not on file    Intimate partner violence     Fear of current or ex partner: Not on file     Emotionally abused: Not on file     Physically abused: Not on file     Forced sexual activity: Not on file   Other Topics Concern    Not on file   Social History Narrative    Not on file      Family History   Problem Relation Age of Onset    Diabetes Mother         \"Borderline Diabetes\"    High Blood Pressure Mother     Obesity Mother     Arthritis Mother     Heart Disease Mother     High Cholesterol Mother     Vision Loss Mother     Diabetes Father     High Blood Pressure Father     Asthma Father     Cancer Father         prostate cancer    High Blood Pressure Brother     Asthma Son     Vision Loss Son     Lupus Daughter     Other Daughter         \"Alot Of Female Problems\"       Review of Symptoms:    14-point system review completed. All of which are negative except as mentioned above.     Physical Exam:           Gen: A&O x 4, NAD, cooperative  HEENT: NC/AT, EOMI, PERRL, mmm,  neck supple, no meningeal signs; F  Heart: regular   Lungs: no distress  Ext: no edema, no calf tenderness b/l  Psych: normal mood and affect  Skin: no rashes or lesions    NEUROLOGIC EXAM:    Mental Status: A&O to self, location, month and year, NAD, speech clear, language fluent, repetition and naming intact, follows commands appropriately    Cranial Nerve Exam:   CN II-XII: No disc edema appreciated on fundoscopic examination, PERRL, VFF, no nystagmus, no gaze paresis, sensation V1-V3 intact b/l, muscles of facial expression symmetric; hearing intact to conversational tone, palate elevates symmetrically, shoulder elevation symmetric and tongue protrudes midline with movement side to side. Motor Exam:       Strength 5/5 UE's/LE's b/l  Tone and bulk normal   No pronator drift    Deep Tendon Reflexes: 2/4 biceps, triceps, brachioradialis, patellar, and achilles b/l; flexor plantar responses b/l    Sensation: Intact light touchUE's/LE's b/l    Coordination/Cerebellum:       Tremors--none      Rapidly alternating movements: no dysdiadochokinesia b/l                Finger-to-Nose: no dysmetria b/l    Gait and stance:      Gait: deferred      LABS:     Recent Labs     02/09/21  1927 02/10/21  0110 02/10/21  1145   WBC 4.6 4.0  --     136 135   K 4.0 3.7 4.3    105 105   CO2 25 23 20*   BUN 18 13 10   CREATININE 0.7 0.8 0.7   GLUCOSE 86 96 111*       IMAGING:      CT head pending     ASSESSMENT/PLAN:     3 44year old female with reported LOC and shaking, breakthrough pseudoseizure v. Epileptic seizure? Superimposed on acute pain, n/v, and excessive pain medication use. Do not suspect any new etiology. Will obtain CT head. No EEG needed. Defer pain management to IM. Continue Keppra 1000mg BID. No further recommendations. Page me if any acute changes. Thank you for allowing us to participate in the care of your patient. If there are any questions regarding evaluation please feel free to contact us.      MICHAEL Norman - CNP, 2/11/2021    Did not see patient, agree with Marielle's gustavo Zuniga, DO

## 2021-02-11 NOTE — PROGRESS NOTES
[facility] PROGRESS NOTE  Date: 2/11/2021   Name: Leotis Koyanagi   MRN: [id number]   YOB: 1970  Chief Complaint   Patient presents with    Abdominal Pain    Nausea    Diarrhea        Subjective/Interval Hx:     -feels a little bit better  -says she still doesn't feel like she can eat much      ROS: negative unless mentioned above  Objective:   Physical Exam:   /70   Pulse 81   Temp 97.5 °F (36.4 °C) (Oral)   Resp 18   Ht 5' 4\" (1.626 m)   Wt 278 lb 1.6 oz (126.1 kg)   SpO2 95%   BMI 47.74 kg/m²   CONSTITUTIONAL:  No acute distress  HENT:  NCAT  LUNGS:  cta b/l bs+  CARDIOVASCULAR:  s1s2 rrr  ABDOMEN:  Soft nd, epigastric tenderness  MUSCULOSKELETAL/Extremities:  No pitting edema, nontender  NEUROLOGIC:  No gross deficits, aao  SKIN:  No gross lesions    Assessment/Plan:     1. Chest pain. ddx includes gastric reflux or cardiac origin  -troponins negative. EKG inverted T wave in lead III, sinus bradycardia. CTA chest: No PE; diffuse bilateral groundglass opacities; multiple small left lower lobe nodules. Cardiology following.  -Stress echo normal.  Okay for DC from cardiac standpoint. 2. Acute on chronic abdominal pain  -CT abdomen pelvis showed no acute findings. Patient states she has chronic pancreatitis. Consult general surgery. Patient states she is supposed to meet her surgeon for follow-up after having a gastric bypass. -Decrease frequency of IV morphine. This may be contributing to her nausea and vomiting. Resume her home dose and frequency of Percocet. GI starting patient on clear liquid diet and advancing as tolerated. 3. Possible breakthrough seizures/pseudoseizures  -patient has a questionable history of pseudoseizures. Had an episode of seizures today during her rapid response. This may have been due to patient not being able to take her oral antiepileptics due to her nausea vomiting. Start IV Keppra. -Neurology ordered CT head.   No further 02/10/21  0508 02/10/21  1145   TROPONINT <0.010 <0.010 <0.010     HgBA1c:   Lab Results   Component Value Date    LABA1C 5.4 02/06/2016     CALCIUM:  8.9/20 (02/10 1145)    I/O last 3 completed shifts:   In: 140 [I.V.:140]  Out: -     Intake/Output Summary (Last 24 hours) at 2/11/2021 0939  Last data filed at 2/10/2021 1529  Gross per 24 hour   Intake 140 ml   Output --   Net 140 ml

## 2021-02-11 NOTE — CONSULTS
SURGICAL CONSULTATION    Date of Admission:  2/9/2021  Date of Consultation:  2/11/2021    PCP:  Xavier Rojas MD  Thank you very much for your consultation, it's always appreciated. I had the privilege today to see your patient Gabrielle Rodriguez. Chief Complaint / History of Present Illness:  Gabrielle Rodriguez is a very pleasant 44 y.o. female who presents with pain in the Epigastrium and lower chest, and is chronic, improving and dull compared to patient's normal condition. It is moderate in intensity for a duration of 2 days and is intermittent with modifying factors increased by or worse with eating, and drinking, and sometimes without. .      Last seen  3,5 wks ago 2/15/2021:  Liliane Sea / Plan:     1. Discoloration of skin of flank resembling ecchymosis    2. Status post laparoscopic sleeve gastrectomy    3. Gastroesophageal reflux disease without esophagitis    4. Fibromyalgia    5. Morbid obesity with BMI of 50.0-59.9, adult ([facility] [address]          Needs PCP will refer her, needs RYGB, will get the clearances, and SOON will do. BUT I need to r/o coag issues, bruised bad. .     NO GERD, but sometimes Vomiting. .     Cardiac and Pulm clearances in progress.     Follow Up:    Return in about 4 weeks (around 2/12/2021) for Bariatric follow up: diet, exercise & weight loss, For imaging and tests results review. \"      Will see her in the office to schedule her RYGB soon; cardiac and pulm clearances DONE.    PMH:   has a past medical history of Acid reflux, Anemia, Anxiety, Arthritis, Bleeding ulcer (2014), Bronchitis (Last Episode 2014), Chronic back pain, Chronic pain, COPD (chronic obstructive pulmonary disease) ([facility] [address] Depression, Fibromyalgia (Dx 2013), H/O echocardiogram (8/11/15), H/O echocardiogram (08/30/2018), Hiatal hernia, History of blood transfusion (09/2015 And 2018), [place] (hard of hearing), cardiac catheterization (10/24/2010), transesophageal echocardiography (PILO) for monitoring (12/2/2010), OTHER MEDICAL, OTHER MEDICAL, Hypertension, Kidney cysts, Lupus ([facility] [address] (Dx ), MDRO (multiple drug resistant organisms) resistance, Morbid obesity ([facility] [address] MRSA bacteremia, Nausea, Pain management, Pancreatitis chronic (Dx ), Pneumonia (Dx -15), Shortness of breath on exertion, Shortness of breath on exertion, Sleep apnea, Staph infection (Dx -15), Staph infection (Dx 11-15), Teeth missing, Thyroid disease, and Wears glasses. PSH:   has a past surgical history that includes Lung removal, partial (Left, );  section (1991); Foot surgery (Left, Last Done In ); Dental surgery; Cholecystectomy, laparoscopic (4796'E); other surgical history (1998); other surgical history (Last Done 7-15-16); Tonsillectomy and adenoidectomy (); Appendectomy (1998); Upper gastrointestinal endoscopy (2018); Lap Band (~); Hysterectomy (10/1997); Endoscopy, colon, diagnostic (Last Done In ); Colonoscopy (Last Done In ); Cardiac catheterization (10/24/2010); Sleeve Gastrectomy (N/A, 2019); hiatal hernia repair (N/A, 2019); Upper gastrointestinal endoscopy (N/A, 2019); Foot Debridement (Left, 2019); Upper gastrointestinal endoscopy (N/A, 2019); Catheter Removal (N/A, 2019); Upper gastrointestinal endoscopy (N/A, 2019); INSERTION / REMOVAL / REPLACEMENT VENOUS ACCESS CATHETER (N/A, 8/15/2019); Upper gastrointestinal endoscopy (N/A, 10/14/2019); [address] Surgery (N/A, 1/15/2020); Port Surgery (N/A, 2020); and Upper gastrointestinal endoscopy (N/A, 2020). Allergies: Allergies   Allergen Reactions    Aspirin Palpitations     \"My Heart Rate Elevates\"    Shellfish-Derived Products Swelling    Toradol [Ketorolac Tromethamine] Rash    Compazine [Prochlorperazine]     Reglan [Metoclopramide] Itching        Home Meds:    Prior to Admission medications    Medication Sig Start Date End Date Taking?  Authorizing Provider   albuterol sulfate  Jeffery Bajwa, DO   25 mg at 02/11/21 1021    dicyclomine (BENTYL) capsule 10 mg  10 mg Oral TID PRN Jeffery Bajwa DO        gabapentin (NEURONTIN) capsule 800 mg  800 mg Oral TID Jeffery Bajwa, DO   800 mg at 02/11/21 1332    levothyroxine (SYNTHROID) tablet 125 mcg  125 mcg Oral Nightly Jaye Hampton, DO   125 mcg at 02/10/21 2032    PARoxetine (PAXIL) tablet 50 mg  50 mg Oral Nightly Jaye Hampton, DO   50 mg at 02/10/21 2032    oxyCODONE-acetaminophen (PERCOCET) 5-325 MG per tablet 1 tablet  1 tablet Oral Q8H PRN Jeffeyr Bajwa DO   1 tablet at 02/10/21 0118    sodium chloride flush 0.9 % injection 10 mL  10 mL Intravenous 2 times per day Jeffery Bajwa DO   10 mL at 02/11/21 1010    sodium chloride flush 0.9 % injection 10 mL  10 mL Intravenous PRN Jaye Hampton, DO   10 mL at 02/11/21 1332    promethazine (PHENERGAN) tablet 12.5 mg  12.5 mg Oral Q6H PRN Jeffery Bajwa DO        Or    ondansetron (ZOFRAN) injection 4 mg  4 mg Intravenous Q6H PRN Jeffery Bajwa, DO   4 mg at 02/10/21 1529    acetaminophen (TYLENOL) tablet 650 mg  650 mg Oral Q6H PRN Jeffery Bajwa DO        Or    acetaminophen (TYLENOL) suppository 650 mg  650 mg Rectal Q6H PRN Jeffery Bajwa, DO        polyethylene glycol (GLYCOLAX) packet 17 g  17 g Oral Daily PRN Jeffery Bajwa, DO        enoxaparin (LOVENOX) injection 40 mg  40 mg Subcutaneous Daily Jaye Hampton, DO   40 mg at 02/11/21 1008    LORazepam (ATIVAN) injection 1 mg  1 mg Intravenous Once Tiffanie Harper MD        levETIRAcetam (KEPPRA) 1,000 mg in sodium chloride 0.9 % 100 mL IVPB  1,000 mg Intravenous Q12H Tiffanie Harper MD   Stopped at 02/11/21 1056    promethazine (PHENERGAN) injection 12.5 mg  12.5 mg Intramuscular Q6H PRN Tiffanie Harper MD   12.5 mg at 02/11/21 1009    perflutren lipid microspheres (DEFINITY) injection 1.65 mg  1.5 mL Intravenous ONCE PRN Daya Kee MD        morphine (PF) injection 2 mg  2 mg Intravenous Q4H PRN Darryle Point, MD   2 mg at 02/11/21 1009    sodium chloride flush 0.9 % injection 10 mL  10 mL Intravenous BID Jaye Hampton DO   10 mL at 02/10/21 1002       Social History / Family History:     Social History     Socioeconomic History    Marital status:      Spouse name: None    Number of children: None    Years of education: None    Highest education level: None   Occupational History    None   Social Needs    Financial resource strain: None    Food insecurity     Worry: None     Inability: None    Transportation needs     Medical: None     Non-medical: None   Tobacco Use    Smoking status: Never Smoker    Smokeless tobacco: Never Used   Substance and Sexual Activity    Alcohol use: No     Alcohol/week: 0.0 standard drinks    Drug use: No    Sexual activity: Not Currently   Lifestyle    Physical activity     Days per week: None     Minutes per session: None    Stress: None   Relationships    Social connections     Talks on phone: None     Gets together: None     Attends Rastafarian service: None     Active member of club or organization: None     Attends meetings of clubs or organizations: None     Relationship status: None    Intimate partner violence     Fear of current or ex partner: None     Emotionally abused: None     Physically abused: None     Forced sexual activity: None   Other Topics Concern    None   Social History Narrative    None      Family History   Problem Relation Age of Onset    Diabetes Mother         \"Borderline Diabetes\"    High Blood Pressure Mother     Obesity Mother     Arthritis Mother     Heart Disease Mother     High Cholesterol Mother     Vision Loss Mother     Diabetes Father     High Blood Pressure Father     Asthma Father     Cancer Father         prostate cancer    High Blood Pressure Brother     Asthma Son     Vision Loss Son     Lupus Daughter     Other Daughter         \"Alot Of Female Problems\"    otherwise without apparent complication. I reviewed,     Labs:    Recent Results (from the past 24 hour(s))   Procalcitonin    Collection Time: 02/11/21  6:01 AM   Result Value Ref Range    Procalcitonin 0.150    C-Reactive Protein    Collection Time: 02/11/21  6:01 AM   Result Value Ref Range    CRP, High Sensitivity 59.4 mg/L       Diagnosis:  Patient Active Problem List   Diagnosis    Fibromyalgia    Lupus (systemic lupus erythematosus) (HCC)    Chronic pancreatitis (HCC)    HTN (hypertension)    Generalized abdominal pain    Frequent UTI    Gastroesophageal reflux disease without esophagitis    Depression    Fatty liver disease, nonalcoholic    Arthritis    Bilateral low back pain with left-sided sciatica    Intractable vomiting with nausea    Hiatal hernia    Status post laparoscopic sleeve gastrectomy    Pseudoseizure    Chronic pain syndrome    Drug-seeking/Aberrant behavior    Lymph node disorder    Morbid obesity with BMI of 40.0-44.9, adult (HCC)    Iron deficiency anemia secondary to blood loss (chronic)    Encounter for weight management    Intractable nausea and vomiting    Seizure (HCC)    Abdominal pain    Anxiety    RUQ pain    Intractable vomiting    Status post bariatric surgery    Morbid obesity with BMI of 45.0-49.9, adult (HCC)    Discoloration of skin of flank resembling ecchymosis    Morbid obesity with BMI of 50.0-59.9, adult (HCC)    Pre-operative clearance    Chest pain of uncertain etiology       Assessment & plan:  Ruth Presley is a very pleasant 44 y.o. female presenting with morbid obesity, and gastroparesis, needs the RYGB, Will see her in the office to schedule her RYGB soon; cardiac and pulm clearances DONE.  ERCP needs to be done FIRST for sphincter of Oddi dysfunction, to help her RUQ pain. .    Thank you doctor very much for your consultation and for the opportunity to take care of Mrs Ruth Presley with you, I'll follow along with you and I'll

## 2021-02-12 LAB
ALBUMIN SERPL-MCNC: 3.7 GM/DL (ref 3.4–5)
ALP BLD-CCNC: 85 IU/L (ref 40–128)
ALT SERPL-CCNC: 13 U/L (ref 10–40)
AMYLASE: 25 U/L (ref 25–115)
ANION GAP SERPL CALCULATED.3IONS-SCNC: 10 MMOL/L (ref 4–16)
APTT: 30.4 SECONDS (ref 25.1–37.1)
AST SERPL-CCNC: 21 IU/L (ref 15–37)
BASOPHILS ABSOLUTE: 0 K/CU MM
BASOPHILS RELATIVE PERCENT: 0.5 % (ref 0–1)
BILIRUB SERPL-MCNC: 0.2 MG/DL (ref 0–1)
BUN BLDV-MCNC: 14 MG/DL (ref 6–23)
CALCIUM SERPL-MCNC: 8.4 MG/DL (ref 8.3–10.6)
CHLORIDE BLD-SCNC: 110 MMOL/L (ref 99–110)
CO2: 22 MMOL/L (ref 21–32)
CREAT SERPL-MCNC: 0.8 MG/DL (ref 0.6–1.1)
DIFFERENTIAL TYPE: ABNORMAL
EOSINOPHILS ABSOLUTE: 0.2 K/CU MM
EOSINOPHILS RELATIVE PERCENT: 5 % (ref 0–3)
GFR AFRICAN AMERICAN: >60 ML/MIN/1.73M2
GFR NON-AFRICAN AMERICAN: >60 ML/MIN/1.73M2
GLUCOSE BLD-MCNC: 96 MG/DL (ref 70–99)
HCT VFR BLD CALC: 37.8 % (ref 37–47)
HEMOGLOBIN: 10.3 GM/DL (ref 12.5–16)
IMMATURE NEUTROPHIL %: 0.3 % (ref 0–0.43)
INR BLD: 0.86 INDEX
LIPASE: 15 IU/L (ref 13–60)
LYMPHOCYTES ABSOLUTE: 1.4 K/CU MM
LYMPHOCYTES RELATIVE PERCENT: 37.3 % (ref 24–44)
MCH RBC QN AUTO: 25.2 PG (ref 27–31)
MCHC RBC AUTO-ENTMCNC: 27.2 % (ref 32–36)
MCV RBC AUTO: 92.6 FL (ref 78–100)
MONOCYTES ABSOLUTE: 0.4 K/CU MM
MONOCYTES RELATIVE PERCENT: 10.8 % (ref 0–4)
NUCLEATED RBC %: 0 %
PDW BLD-RTO: 14.1 % (ref 11.7–14.9)
PLATELET # BLD: 174 K/CU MM (ref 140–440)
PMV BLD AUTO: 10.4 FL (ref 7.5–11.1)
POTASSIUM SERPL-SCNC: 4.5 MMOL/L (ref 3.5–5.1)
PROTHROMBIN TIME: 10.4 SECONDS (ref 11.7–14.5)
RBC # BLD: 4.08 M/CU MM (ref 4.2–5.4)
SEGMENTED NEUTROPHILS ABSOLUTE COUNT: 1.8 K/CU MM
SEGMENTED NEUTROPHILS RELATIVE PERCENT: 46.1 % (ref 36–66)
SODIUM BLD-SCNC: 142 MMOL/L (ref 135–145)
TOTAL IMMATURE NEUTOROPHIL: 0.01 K/CU MM
TOTAL NUCLEATED RBC: 0 K/CU MM
TOTAL PROTEIN: 6.1 GM/DL (ref 6.4–8.2)
WBC # BLD: 3.8 K/CU MM (ref 4–10.5)

## 2021-02-12 PROCEDURE — 2580000003 HC RX 258: Performed by: STUDENT IN AN ORGANIZED HEALTH CARE EDUCATION/TRAINING PROGRAM

## 2021-02-12 PROCEDURE — 6370000000 HC RX 637 (ALT 250 FOR IP): Performed by: STUDENT IN AN ORGANIZED HEALTH CARE EDUCATION/TRAINING PROGRAM

## 2021-02-12 PROCEDURE — G0378 HOSPITAL OBSERVATION PER HR: HCPCS

## 2021-02-12 PROCEDURE — 96372 THER/PROPH/DIAG INJ SC/IM: CPT

## 2021-02-12 PROCEDURE — 85730 THROMBOPLASTIN TIME PARTIAL: CPT

## 2021-02-12 PROCEDURE — 83690 ASSAY OF LIPASE: CPT

## 2021-02-12 PROCEDURE — 85610 PROTHROMBIN TIME: CPT

## 2021-02-12 PROCEDURE — 82150 ASSAY OF AMYLASE: CPT

## 2021-02-12 PROCEDURE — 96376 TX/PRO/DX INJ SAME DRUG ADON: CPT

## 2021-02-12 PROCEDURE — C9113 INJ PANTOPRAZOLE SODIUM, VIA: HCPCS | Performed by: SPECIALIST

## 2021-02-12 PROCEDURE — 2580000003 HC RX 258: Performed by: HOSPITALIST

## 2021-02-12 PROCEDURE — 6360000002 HC RX W HCPCS: Performed by: STUDENT IN AN ORGANIZED HEALTH CARE EDUCATION/TRAINING PROGRAM

## 2021-02-12 PROCEDURE — 85025 COMPLETE CBC W/AUTO DIFF WBC: CPT

## 2021-02-12 PROCEDURE — 6360000002 HC RX W HCPCS: Performed by: HOSPITALIST

## 2021-02-12 PROCEDURE — 94761 N-INVAS EAR/PLS OXIMETRY MLT: CPT

## 2021-02-12 PROCEDURE — 1200000000 HC SEMI PRIVATE

## 2021-02-12 PROCEDURE — 99232 SBSQ HOSP IP/OBS MODERATE 35: CPT | Performed by: SURGERY

## 2021-02-12 PROCEDURE — 36415 COLL VENOUS BLD VENIPUNCTURE: CPT

## 2021-02-12 PROCEDURE — 80053 COMPREHEN METABOLIC PANEL: CPT

## 2021-02-12 PROCEDURE — 6360000002 HC RX W HCPCS: Performed by: SPECIALIST

## 2021-02-12 PROCEDURE — 6370000000 HC RX 637 (ALT 250 FOR IP): Performed by: HOSPITALIST

## 2021-02-12 RX ADMIN — OXYCODONE HYDROCHLORIDE AND ACETAMINOPHEN 1 TABLET: 5; 325 TABLET ORAL at 20:43

## 2021-02-12 RX ADMIN — PROMETHAZINE HYDROCHLORIDE 12.5 MG: 12.5 TABLET ORAL at 00:24

## 2021-02-12 RX ADMIN — LEVOTHYROXINE SODIUM 125 MCG: 125 TABLET ORAL at 20:41

## 2021-02-12 RX ADMIN — ATENOLOL 25 MG: 25 TABLET ORAL at 08:54

## 2021-02-12 RX ADMIN — MORPHINE SULFATE 2 MG: 2 INJECTION, SOLUTION INTRAMUSCULAR; INTRAVENOUS at 10:34

## 2021-02-12 RX ADMIN — GABAPENTIN 800 MG: 400 CAPSULE ORAL at 13:15

## 2021-02-12 RX ADMIN — SODIUM CHLORIDE, PRESERVATIVE FREE 10 ML: 5 INJECTION INTRAVENOUS at 20:42

## 2021-02-12 RX ADMIN — PROMETHAZINE HYDROCHLORIDE 12.5 MG: 25 INJECTION INTRAMUSCULAR; INTRAVENOUS at 10:39

## 2021-02-12 RX ADMIN — PROMETHAZINE HYDROCHLORIDE 12.5 MG: 25 INJECTION INTRAMUSCULAR; INTRAVENOUS at 16:31

## 2021-02-12 RX ADMIN — HYDROXYZINE PAMOATE 50 MG: 25 CAPSULE ORAL at 10:30

## 2021-02-12 RX ADMIN — OXYCODONE HYDROCHLORIDE AND ACETAMINOPHEN 1 TABLET: 5; 325 TABLET ORAL at 00:23

## 2021-02-12 RX ADMIN — GABAPENTIN 800 MG: 400 CAPSULE ORAL at 08:54

## 2021-02-12 RX ADMIN — SODIUM CHLORIDE, PRESERVATIVE FREE 10 ML: 5 INJECTION INTRAVENOUS at 08:55

## 2021-02-12 RX ADMIN — HYDROXYZINE PAMOATE 50 MG: 25 CAPSULE ORAL at 00:24

## 2021-02-12 RX ADMIN — MORPHINE SULFATE 2 MG: 2 INJECTION, SOLUTION INTRAMUSCULAR; INTRAVENOUS at 03:52

## 2021-02-12 RX ADMIN — HYDROXYZINE PAMOATE 50 MG: 25 CAPSULE ORAL at 16:31

## 2021-02-12 RX ADMIN — MORPHINE SULFATE 2 MG: 2 INJECTION, SOLUTION INTRAMUSCULAR; INTRAVENOUS at 16:31

## 2021-02-12 RX ADMIN — SODIUM CHLORIDE, PRESERVATIVE FREE 10 ML: 5 INJECTION INTRAVENOUS at 08:53

## 2021-02-12 RX ADMIN — OXYCODONE HYDROCHLORIDE AND ACETAMINOPHEN 1 TABLET: 5; 325 TABLET ORAL at 09:02

## 2021-02-12 RX ADMIN — PANTOPRAZOLE SODIUM 40 MG: 40 INJECTION, POWDER, LYOPHILIZED, FOR SOLUTION INTRAVENOUS at 08:53

## 2021-02-12 RX ADMIN — GABAPENTIN 800 MG: 400 CAPSULE ORAL at 20:41

## 2021-02-12 RX ADMIN — LEVETIRACETAM 1000 MG: 100 INJECTION, SOLUTION INTRAVENOUS at 12:13

## 2021-02-12 RX ADMIN — ENOXAPARIN SODIUM 40 MG: 100 INJECTION SUBCUTANEOUS at 08:54

## 2021-02-12 RX ADMIN — PAROXETINE 50 MG: 10 TABLET, FILM COATED ORAL at 20:41

## 2021-02-12 ASSESSMENT — PAIN SCALES - GENERAL
PAINLEVEL_OUTOF10: 6
PAINLEVEL_OUTOF10: 6
PAINLEVEL_OUTOF10: 7
PAINLEVEL_OUTOF10: 6
PAINLEVEL_OUTOF10: 6
PAINLEVEL_OUTOF10: 5
PAINLEVEL_OUTOF10: 6

## 2021-02-12 ASSESSMENT — PAIN DESCRIPTION - FREQUENCY: FREQUENCY: INTERMITTENT

## 2021-02-12 ASSESSMENT — PAIN DESCRIPTION - ORIENTATION: ORIENTATION: RIGHT

## 2021-02-12 ASSESSMENT — PAIN DESCRIPTION - LOCATION
LOCATION: ABDOMEN
LOCATION: ABDOMEN

## 2021-02-12 ASSESSMENT — PAIN DESCRIPTION - ONSET: ONSET: ON-GOING

## 2021-02-12 NOTE — PROGRESS NOTES
GENERAL SURGERY PROGRESS NOTE    CC/HPI:           Patient feels better and tolerating PO. Vitals:    02/11/21 0952 02/11/21 1433 02/11/21 2000 02/12/21 0438   BP: 132/74 120/67 128/67 (!) 149/101   Pulse: 71 66 75 65   Resp: 18 17 18 18   Temp: 97.6 °F (36.4 °C) 98.3 °F (36.8 °C) 97.9 °F (36.6 °C) 98.2 °F (36.8 °C)   TempSrc: Oral Oral Oral Oral   SpO2: 99% 96% 97% 94%   Weight:       Height:         I/O last 3 completed shifts: In: 440 [P.O.:300; I.V.:140]  Out: -   No intake/output data recorded.     DIET GENERAL;    Recent Results (from the past 48 hour(s))   EKG 12 Lead    Collection Time: 02/10/21 11:05 AM   Result Value Ref Range    Ventricular Rate 76 BPM    Atrial Rate 76 BPM    P-R Interval 176 ms    QRS Duration 88 ms    Q-T Interval 382 ms    QTc Calculation (Bazett) 429 ms    P Axis 56 degrees    R Axis -12 degrees    T Axis 39 degrees    Diagnosis       Normal sinus rhythm  Normal ECG  When compared with ECG of 10-FEB-2021 06:53,  No significant change was found  Confirmed by Gustabo Carias MD, Kaila Doherty ([id number]) on 2/11/2021 5:06:48 PM     Basic metabolic panel    Collection Time: 02/10/21 11:45 AM   Result Value Ref Range    Sodium 135 135 - 145 MMOL/L    Potassium 4.3 3.5 - 5.1 MMOL/L    Chloride 105 99 - 110 mMol/L    CO2 20 (L) 21 - 32 MMOL/L    Anion Gap 10 4 - 16    BUN 10 6 - 23 MG/DL    CREATININE 0.7 0.6 - 1.1 MG/DL    Glucose 111 (H) 70 - 99 MG/DL    Calcium 8.9 8.3 - 10.6 MG/DL    GFR Non-African American >60 >60 mL/min/1.73m2    GFR African American >60 >60 mL/min/1.73m2   SPECIMEN REJECTION    Collection Time: 02/10/21 11:45 AM   Result Value Ref Range    Rejected Test TROT     Reason for Rejection UNABLE TO PERFORM TESTING:    Troponin    Collection Time: 02/10/21 11:45 AM   Result Value Ref Range    Troponin T <0.010 <0.01 NG/ML   Procalcitonin    Collection Time: 02/11/21  6:01 AM   Result Value Ref Range    Procalcitonin 0.150    C-Reactive Protein    Collection Time: 02/11/21  6:01 AM   Result Value Ref Range    CRP, High Sensitivity 59.4 mg/L       Scheduled Meds:   pantoprazole  40 mg Intravenous Daily    atenolol  25 mg Oral Daily    gabapentin  800 mg Oral TID    levothyroxine  125 mcg Oral Nightly    PARoxetine  50 mg Oral Nightly    sodium chloride flush  10 mL Intravenous 2 times per day    enoxaparin  40 mg Subcutaneous Daily    LORazepam  1 mg Intravenous Once    levetiracetam  1,000 mg Intravenous Q12H    sodium chloride flush  10 mL Intravenous BID       Continuous Infusions:    Physical Exam:  HEENT: Anicteric sclerae, Oropharyngeal mucosae moist, pink and intact. Heart:  Normal S1 and S2, RRR  Lungs: Clear to auscultation bilaterally, No audible Wheezes or Rales. Extremities: No edema. Neuro: Alert and Oriented x 3, Non focal.  Abdomen: Soft, Benign, less tender, Non distended, Positive bowel sounds. Active Problems:    Chest pain of uncertain etiology  Resolved Problems:    * No resolved hospital problems. *      Assessment and Plan:  Rosina Medrano is a 44 y.o. female with epigastric pain, and chest pain, ? Sphincter of Oddi dysfunction, needs ERCP and then needs the RYGB to help her loose more weight. .    Will f/u in clinic.    ___________________________________________    Harrison Dudley MD, FACS, FICS  2/12/2021  7:09 AM

## 2021-02-12 NOTE — PROGRESS NOTES
[facility]PITAL[facility] PROGRESS NOTE  Date: 2/12/2021   Name: Sendy Dietrich   MRN: [id number]   YOB: 1970  Chief Complaint   Patient presents with    Abdominal Pain    Nausea    Diarrhea        Subjective/Interval Hx:     -she feels so-so today  -she is trying to eat a little more     ROS: negative unless mentioned above  Objective:   Physical Exam:   BP (!) 149/101   Pulse 65   Temp 98.2 °F (36.8 °C) (Oral)   Resp 18   Ht 5' 4\" (1.626 m)   Wt 278 lb 1.6 oz (126.1 kg)   SpO2 94%   BMI 47.74 kg/m²   CONSTITUTIONAL:  No acute distress, more relaxed  HENT:  NCAT  LUNGS:  cta b/l bs+  CARDIOVASCULAR:  s1s2 rrr  ABDOMEN:  Soft nd, mild epigastric tenderness  MUSCULOSKELETAL/Extremities:  No pitting edema, nontender  NEUROLOGIC:  No gross deficits, aao  SKIN:  No gross lesions    Assessment/Plan:     1. Chest pain. ddx includes gastric reflux or cardiac origin  -troponins negative. EKG inverted T wave in lead III, sinus bradycardia. CTA chest: No PE; diffuse bilateral groundglass opacities; multiple small left lower lobe nodules. Cardiology following.  -Stress echo normal.  Okay for DC from cardiac standpoint. 2. Acute on chronic abdominal pain  -CT abdomen pelvis showed no acute findings. Patient states she has chronic pancreatitis. Consult general surgery. Patient states she is supposed to meet her surgeon for follow-up after having a gastric bypass. -Decrease frequency of IV morphine. This may be contributing to her nausea and vomiting. Resume her home dose and frequency of Percocet. GI starting patient on clear liquid diet and advancing as tolerated. Surgery recommending RYGB as outpatient. Surgery also recommending ERCP needs to be done first for sphincter of Oddi dysfunction. 3. Possible breakthrough seizures/pseudoseizures  -patient has a questionable history of pseudoseizures. Had an episode of seizures 2/10 during her rapid response.   This may have been due to patient not being able to take her oral antiepileptics due to her nausea vomiting. Start IV Keppra. -No further work-up as per neurology. -CT head: No acute process. 4. Lung nodules  -seen on CT chest and appear to be new. Will need follow-up CT chest in 2 to 3 months. Patient does not present any symptoms suggestive of pulmonary infection. Check procalcitonin. COVID-19 negative. -CRP 59.  -Procalcitonin 0.15. · Chronic pancreatitis, not in exacerbation  · Chronic pseudoseizures  · Morbid obesity with BMI of 47     Hx of gastric bypass       DVT Prophylaxis: lovenox  Diet: DIET GENERAL;  Code Status: Full Code      Dispo:  -Need patient tolerated diet before being able to go home. Charan Nguyen MD  2/12/2021    Meds:   Meds:    pantoprazole  40 mg Intravenous Daily    atenolol  25 mg Oral Daily    gabapentin  800 mg Oral TID    levothyroxine  125 mcg Oral Nightly    PARoxetine  50 mg Oral Nightly    sodium chloride flush  10 mL Intravenous 2 times per day    enoxaparin  40 mg Subcutaneous Daily    LORazepam  1 mg Intravenous Once    levetiracetam  1,000 mg Intravenous Q12H    sodium chloride flush  10 mL Intravenous BID      Infusions:   PRN Meds:     hydrOXYzine, 50 mg, Q6H PRN      morphine, 2 mg, Q6H PRN      oxyCODONE-acetaminophen, 1 tablet, Q6H PRN      dicyclomine, 10 mg, TID PRN      sodium chloride flush, 10 mL, PRN      promethazine, 12.5 mg, Q6H PRN    Or      ondansetron, 4 mg, Q6H PRN      acetaminophen, 650 mg, Q6H PRN    Or      acetaminophen, 650 mg, Q6H PRN      polyethylene glycol, 17 g, Daily PRN      promethazine, 12.5 mg, Q6H PRN      perflutren lipid microspheres, 1.5 mL, ONCE PRN        Data/Labs:     Recent Labs     02/09/21  1927 02/10/21  0110   WBC 4.6 4.0   HGB 10.2* 10.7*   HCT 32.9* 36.2*    152      Recent Labs     02/09/21  1927 02/10/21  0110 02/10/21  1145    136 135   K 4.0 3.7 4.3    105 105   CO2 25 23 20*   BUN 18 13 10   CREATININE 0.7 0.8 0.7     Recent Labs     02/09/21  1927   AST 14*   ALT 12   BILITOT 0.1   ALKPHOS 84     No results for input(s): INR in the last 72 hours. Recent Labs     02/10/21  0110 02/10/21  0508 02/10/21  1145   TROPONINT <0.010 <0.010 <0.010     HgBA1c:   Lab Results   Component Value Date    LABA1C 5.4 02/06/2016     CALCIUM:  8.9/20 (02/10 1145)    I/O last 3 completed shifts: In: 440 [P.O.:300;  I.V.:140]  Out: -     Intake/Output Summary (Last 24 hours) at 2/12/2021 0757  Last data filed at 2/12/2021 0644  Gross per 24 hour   Intake 440 ml   Output --   Net 440 ml

## 2021-02-13 VITALS
HEIGHT: 64 IN | HEART RATE: 67 BPM | SYSTOLIC BLOOD PRESSURE: 183 MMHG | RESPIRATION RATE: 16 BRPM | BODY MASS INDEX: 47.48 KG/M2 | TEMPERATURE: 98.2 F | DIASTOLIC BLOOD PRESSURE: 86 MMHG | WEIGHT: 278.1 LBS | OXYGEN SATURATION: 99 %

## 2021-02-13 LAB
ALBUMIN SERPL-MCNC: 3.6 GM/DL (ref 3.4–5)
ALP BLD-CCNC: 82 IU/L (ref 40–128)
ALT SERPL-CCNC: 13 U/L (ref 10–40)
ANION GAP SERPL CALCULATED.3IONS-SCNC: 6 MMOL/L (ref 4–16)
AST SERPL-CCNC: 16 IU/L (ref 15–37)
BASOPHILS ABSOLUTE: 0 K/CU MM
BASOPHILS RELATIVE PERCENT: 0.6 % (ref 0–1)
BILIRUB SERPL-MCNC: 0.2 MG/DL (ref 0–1)
BUN BLDV-MCNC: 14 MG/DL (ref 6–23)
CALCIUM SERPL-MCNC: 9.2 MG/DL (ref 8.3–10.6)
CHLORIDE BLD-SCNC: 106 MMOL/L (ref 99–110)
CO2: 26 MMOL/L (ref 21–32)
CREAT SERPL-MCNC: 0.7 MG/DL (ref 0.6–1.1)
DIFFERENTIAL TYPE: ABNORMAL
EOSINOPHILS ABSOLUTE: 0.2 K/CU MM
EOSINOPHILS RELATIVE PERCENT: 4.4 % (ref 0–3)
GFR AFRICAN AMERICAN: >60 ML/MIN/1.73M2
GFR NON-AFRICAN AMERICAN: >60 ML/MIN/1.73M2
GLUCOSE BLD-MCNC: 87 MG/DL (ref 70–99)
HCT VFR BLD CALC: 34.6 % (ref 37–47)
HEMOGLOBIN: 10.3 GM/DL (ref 12.5–16)
HIGH SENSITIVE C-REACTIVE PROTEIN: 11.1 MG/L
IMMATURE NEUTROPHIL %: 0.3 % (ref 0–0.43)
LIPASE: 15 IU/L (ref 13–60)
LYMPHOCYTES ABSOLUTE: 1.6 K/CU MM
LYMPHOCYTES RELATIVE PERCENT: 44.5 % (ref 24–44)
MCH RBC QN AUTO: 25.2 PG (ref 27–31)
MCHC RBC AUTO-ENTMCNC: 29.8 % (ref 32–36)
MCV RBC AUTO: 84.6 FL (ref 78–100)
MONOCYTES ABSOLUTE: 0.2 K/CU MM
MONOCYTES RELATIVE PERCENT: 6.6 % (ref 0–4)
NUCLEATED RBC %: 0 %
PDW BLD-RTO: 14.1 % (ref 11.7–14.9)
PLATELET # BLD: 168 K/CU MM (ref 140–440)
PMV BLD AUTO: 10.4 FL (ref 7.5–11.1)
POTASSIUM SERPL-SCNC: 4.7 MMOL/L (ref 3.5–5.1)
RBC # BLD: 4.09 M/CU MM (ref 4.2–5.4)
SEGMENTED NEUTROPHILS ABSOLUTE COUNT: 1.6 K/CU MM
SEGMENTED NEUTROPHILS RELATIVE PERCENT: 43.6 % (ref 36–66)
SODIUM BLD-SCNC: 138 MMOL/L (ref 135–145)
TOTAL IMMATURE NEUTOROPHIL: 0.01 K/CU MM
TOTAL NUCLEATED RBC: 0 K/CU MM
TOTAL PROTEIN: 5.9 GM/DL (ref 6.4–8.2)
WBC # BLD: 3.6 K/CU MM (ref 4–10.5)

## 2021-02-13 PROCEDURE — 6360000002 HC RX W HCPCS: Performed by: SPECIALIST

## 2021-02-13 PROCEDURE — 2580000003 HC RX 258: Performed by: STUDENT IN AN ORGANIZED HEALTH CARE EDUCATION/TRAINING PROGRAM

## 2021-02-13 PROCEDURE — 6370000000 HC RX 637 (ALT 250 FOR IP): Performed by: HOSPITALIST

## 2021-02-13 PROCEDURE — 6360000002 HC RX W HCPCS: Performed by: HOSPITALIST

## 2021-02-13 PROCEDURE — 6370000000 HC RX 637 (ALT 250 FOR IP): Performed by: STUDENT IN AN ORGANIZED HEALTH CARE EDUCATION/TRAINING PROGRAM

## 2021-02-13 PROCEDURE — 85025 COMPLETE CBC W/AUTO DIFF WBC: CPT

## 2021-02-13 PROCEDURE — 2580000003 HC RX 258: Performed by: HOSPITALIST

## 2021-02-13 PROCEDURE — 6360000002 HC RX W HCPCS: Performed by: STUDENT IN AN ORGANIZED HEALTH CARE EDUCATION/TRAINING PROGRAM

## 2021-02-13 PROCEDURE — 83690 ASSAY OF LIPASE: CPT

## 2021-02-13 PROCEDURE — C9113 INJ PANTOPRAZOLE SODIUM, VIA: HCPCS | Performed by: SPECIALIST

## 2021-02-13 PROCEDURE — 36415 COLL VENOUS BLD VENIPUNCTURE: CPT

## 2021-02-13 PROCEDURE — 94761 N-INVAS EAR/PLS OXIMETRY MLT: CPT

## 2021-02-13 PROCEDURE — 86141 C-REACTIVE PROTEIN HS: CPT

## 2021-02-13 PROCEDURE — 80053 COMPREHEN METABOLIC PANEL: CPT

## 2021-02-13 RX ORDER — HEPARIN SODIUM (PORCINE) LOCK FLUSH IV SOLN 100 UNIT/ML 100 UNIT/ML
500 SOLUTION INTRAVENOUS PRN
Status: DISCONTINUED | OUTPATIENT
Start: 2021-02-13 | End: 2021-02-13 | Stop reason: HOSPADM

## 2021-02-13 RX ORDER — HYDROXYZINE PAMOATE 50 MG/1
50 CAPSULE ORAL EVERY 6 HOURS PRN
Qty: 20 CAPSULE | Refills: 0 | Status: SHIPPED | OUTPATIENT
Start: 2021-02-13 | End: 2021-03-22 | Stop reason: SDUPTHER

## 2021-02-13 RX ADMIN — PROMETHAZINE HYDROCHLORIDE 12.5 MG: 25 INJECTION INTRAMUSCULAR; INTRAVENOUS at 00:52

## 2021-02-13 RX ADMIN — MORPHINE SULFATE 2 MG: 2 INJECTION, SOLUTION INTRAMUSCULAR; INTRAVENOUS at 10:06

## 2021-02-13 RX ADMIN — HYDROXYZINE PAMOATE 50 MG: 25 CAPSULE ORAL at 00:53

## 2021-02-13 RX ADMIN — LEVETIRACETAM 1000 MG: 100 INJECTION, SOLUTION INTRAVENOUS at 11:47

## 2021-02-13 RX ADMIN — MORPHINE SULFATE 2 MG: 2 INJECTION, SOLUTION INTRAMUSCULAR; INTRAVENOUS at 00:53

## 2021-02-13 RX ADMIN — LEVETIRACETAM 1000 MG: 100 INJECTION, SOLUTION INTRAVENOUS at 00:53

## 2021-02-13 RX ADMIN — ATENOLOL 25 MG: 25 TABLET ORAL at 09:52

## 2021-02-13 RX ADMIN — SODIUM CHLORIDE, PRESERVATIVE FREE 10 ML: 5 INJECTION INTRAVENOUS at 10:11

## 2021-02-13 RX ADMIN — ENOXAPARIN SODIUM 40 MG: 100 INJECTION SUBCUTANEOUS at 09:52

## 2021-02-13 RX ADMIN — HYDROXYZINE PAMOATE 50 MG: 25 CAPSULE ORAL at 10:06

## 2021-02-13 RX ADMIN — Medication 500 UNITS: at 12:32

## 2021-02-13 RX ADMIN — PROMETHAZINE HYDROCHLORIDE 12.5 MG: 25 INJECTION INTRAMUSCULAR; INTRAVENOUS at 10:06

## 2021-02-13 RX ADMIN — PANTOPRAZOLE SODIUM 40 MG: 40 INJECTION, POWDER, LYOPHILIZED, FOR SOLUTION INTRAVENOUS at 09:52

## 2021-02-13 RX ADMIN — GABAPENTIN 800 MG: 400 CAPSULE ORAL at 09:52

## 2021-02-13 ASSESSMENT — PAIN SCALES - GENERAL
PAINLEVEL_OUTOF10: 7
PAINLEVEL_OUTOF10: 6

## 2021-02-13 NOTE — DISCHARGE SUMMARY
Hospital Medicine  DISCHARGE SUMMARY  Date: 2/13/2021  Name: Filippo Guzman  MRN: [id number]  YOB: 1970     Patient's PCP: Devante Light MD  Admit Date: 2/9/2021   Discharge Date: 2/13/2021  Admitting Physician: Calderon Valdes DO  Discharge Physician: Lela Schroeder MD      Discharge Diagnoses:  1. Chest pain. 2. Acute on chronic abdominal pain  3. Possible breakthrough seizures/pseudoseizures  4. Lung nodules  5. Anxiety    HPI:    Chief Complaint   Patient presents with    Abdominal Pain    Nausea    Diarrhea     Andre Pope is a 44 y.o.  female  who presents with central chest pain that started ~7-10 days ago and has progressively worsened over the last week. She locates the pain to her central chest , radiates through to her back and right neck. Denies alleviating factors or exacerbating factors. Denies having this pain previously and it does not feel like her chronic dyspepsia/gerd. Denies hx of heart work up in the past. Admits to hx of EGD by Dr Macie Barrett and Dr Frankie Huerta in the past      Hospital Course  Patient came in with chest pain. Troponins were negative. EKG showed inverted T wave in lead III and sinus bradycardia. CT of the chest showed no PE only diffuse bilateral groundglass opacities and multiple small left lower lobe nodules. Stress echo was performed and was normal.  Patient was stable from cardiac standpoint. Patient's chest pain had resolved. She wass not demonstrating any signs of infection. Chest pain may have been due to GI symptoms. Patient had acute on chronic abdominal pain. CT of the abdomen pelvis showed no acute findings. Patient states she has chronic pancreatitis. 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 Patient was placed on a clear liquid diet and advance as tolerated. Her abdominal pain did improve during the course of her stay.   She was able to tolerate a diet.  She will need outpatient follow-up with general surgery. During her stay patient had a rapid response called due to an episode of seizure. This occurred on 2/10/21. This may have been due to patient not being able to take her oral antiepileptics due to her nausea and vomiting. She was placed on IV Keppra. CT of the head showed no acute process. Neurology stated no further work-up at this time. Patient seizures appear to be under control during the rest of her stay. Patient had lung nodule seen on CT of the chest and they appear to be new. She is not demonstrating any signs suggestive of pulmonary infection. Procalcitonin was 0.15. COVID-19 was negative. She will need a follow-up CT chest in 2 to 3 months. Patient had anxiety. She was given Vistaril as needed. Anxiety appeared to be under control. Patient was stable. She was discharged to home. Physical Exam:  BP (!) 183/86   Pulse 67   Temp 98.2 °F (36.8 °C) (Oral)   Resp 16   Ht 5' 4\" (1.626 m)   Wt 278 lb 1.6 oz (126.1 kg)   SpO2 100%   BMI 47.74 kg/m²   CONSTITUTIONAL:  No acute distress, laying in bed  HENT:  NCAT  LUNGS:  cta b/l bs+  CARDIOVASCULAR:  s1s2 rrr  ABDOMEN:  Soft nd, nontender  MUSCULOSKELETAL/Extremities:  No pitting edema, nontender  NEUROLOGIC:  No gross deficits, aao  SKIN:  No gross lesions    Consultations  IP CONSULT TO HOSPITALIST  IP CONSULT TO CARDIOLOGY  IP CONSULT TO NEUROLOGY  IP CONSULT TO GI  IP CONSULT TO GENERAL SURGERY    Invasive procedures:   none    Significant Diagnostic Studies:    Imaging  Echo Pharmacological Stress Imaging  Result Date: 2/10/2021  Conclusions   Summary  Base resting (heart rate: 84bpm):  Left ventricular systolic function is normal.  Ejection fraction is visually estimated at 55%. No regional wall motion abnormalities noted. Low stress (Dobutamine at 20mcg, heart rate: 112bpm):  Left ventricular systolic function is hyperdynamic.   Ejection fraction is visually estimated at 60%. No regional wall motion abnormalities noted. Peak stress (Dobutamine at 30mcg, heart rate: 150bpm):  Left ventricular systolic is hyperdynamic. Ejection fraction is visually estimated at >60%  No regional wall motion abnormalities. Resting (heart rate: 89bpm):  Left ventricular systolic function is normal.  Ejection fraction is visually estimated at 55%. No regional wall motion abnormalities. Impression:  Normal stress echo. No regional wall motion abnormalities. Left ventricular  hypertrophy noted. Ct Head Wo Contrast  Result Date: 2/11/2021  No acute intracranial abnormality. Ct Abdomen Pelvis W Iv Contrast  Result Date: 2/9/2021  Postsurgical findings without apparent complication. Cta Pulmonary W Contrast  Result Date: 2/9/2021  1. No acute pulmonary thromboembolic disease. 2.  Diffuse bilateral ground-glass pulmonary opacities may represent a combination of respiratory motion and atelectasis. Underlying mild pulmonary edema or infection is difficult to entirely exclude. 3.  Multiple small left lower lobe nodular opacities, the largest measuring up to 9 mm. Findings were not present on the prior study from January 10, 2021 and could represent developing infection.   Follow-up chest CT in 2-3 months may be helpful to document improvement and evaluate for malignant potential.       Code Status:  Full Code      Discharge Medications:     Medication List      START taking these medications    hydrOXYzine 50 MG capsule  Commonly known as: VISTARIL  Take 1 capsule by mouth every 6 hours as needed for Anxiety        CHANGE how you take these medications    levothyroxine 125 MCG tablet  Commonly known as: SYNTHROID  Take 1 tablet by mouth Daily  What changed: when to take this        CONTINUE taking these medications    albuterol sulfate  (90 Base) MCG/ACT inhaler  Inhale 2 puffs into the lungs 4 times daily     atenolol 25 MG tablet  Commonly known as: TENORMIN CALCIUM + VIT D (BARIATRIC ADVANTAGE) CHEWABLE TABLET  Take 1 tablet by mouth 2 times daily     dicyclomine 10 MG capsule  Commonly known as: Bentyl  Take 1 capsule by mouth 3 times daily as needed (abdominal pain)     estradiol 0.5 MG tablet  Commonly known as: ESTRACE     ferrous sulfate 325 (65 Fe) MG tablet  Commonly known as: IRON 325  Take 1 tablet by mouth daily (with breakfast)     levETIRAcetam 1000 MG tablet  Commonly known as: KEPPRA  Take 1 tablet by mouth 2 times daily     MVI (BARIATRIC ADVANTAGE MULTI-FORMULA) CHEW TAB  Take 1 tablet by mouth daily     Neurontin 800 MG tablet  Generic drug: gabapentin     oxyCODONE-acetaminophen 5-325 MG per tablet  Commonly known as: PERCOCET     PARoxetine 30 MG tablet  Commonly known as: PAXIL     promethazine 25 MG tablet  Commonly known as: PHENERGAN  Take 1 tablet by mouth every 8 hours as needed for Nausea     Vitamin D3 125 MCG (5000 UT) Tabs           Where to Get Your Medications      These medications were sent to [address] [place] [address], [address] ([employer] [facility] [address]    Phone: [phone]   · hydrOXYzine 50 MG capsule         Patient Instructions: Follow-up With  Details  Why  Contact Mary Gomez MD  In 2 weeks  to check on your ERCP and schedule your bariatric surgery  [address]   Zuleyma Sanchez MD  Schedule an appointment as soon as possible for a visit in 1 week  for follow up of your hospital stay  [phone]  [phone]         Medications: see computerized discharge medication list  Activity: activity as tolerated  Diet: regular diet   Disposition: home  Discharged Condition: Stable       The patient was seen and examined on day of discharge and this discharge summary is in conjunction with any daily progress note from day of discharge.  Time spent on discharge is 28 minutes in the examination, evaluation, counseling and review of medications and discharge plan.     Darryle Point, MD

## 2021-02-17 ENCOUNTER — OFFICE VISIT (OUTPATIENT)
Dept: BARIATRICS/WEIGHT MGMT | Age: 51
End: 2021-02-17
Payer: COMMERCIAL

## 2021-02-17 VITALS
DIASTOLIC BLOOD PRESSURE: 78 MMHG | TEMPERATURE: 96.7 F | OXYGEN SATURATION: 98 % | BODY MASS INDEX: 46.57 KG/M2 | HEIGHT: 64 IN | SYSTOLIC BLOOD PRESSURE: 124 MMHG | WEIGHT: 272.8 LBS | HEART RATE: 68 BPM

## 2021-02-17 DIAGNOSIS — E66.01 MORBID OBESITY WITH BMI OF 45.0-49.9, ADULT (HCC): Primary | ICD-10-CM

## 2021-02-17 PROCEDURE — 1111F DSCHRG MED/CURRENT MED MERGE: CPT | Performed by: SURGERY

## 2021-02-17 PROCEDURE — G8417 CALC BMI ABV UP PARAM F/U: HCPCS | Performed by: SURGERY

## 2021-02-17 PROCEDURE — 99213 OFFICE O/P EST LOW 20 MIN: CPT | Performed by: SURGERY

## 2021-02-17 PROCEDURE — G8427 DOCREV CUR MEDS BY ELIG CLIN: HCPCS | Performed by: SURGERY

## 2021-02-17 PROCEDURE — G8484 FLU IMMUNIZE NO ADMIN: HCPCS | Performed by: SURGERY

## 2021-02-17 PROCEDURE — 3017F COLORECTAL CA SCREEN DOC REV: CPT | Performed by: SURGERY

## 2021-02-17 PROCEDURE — 1036F TOBACCO NON-USER: CPT | Performed by: SURGERY

## 2021-02-17 ASSESSMENT — ENCOUNTER SYMPTOMS
NAUSEA: 0
VOICE CHANGE: 0
WHEEZING: 0
PHOTOPHOBIA: 0
SHORTNESS OF BREATH: 1
ANAL BLEEDING: 0
BACK PAIN: 1
ABDOMINAL PAIN: 1
COUGH: 0
ABDOMINAL DISTENTION: 1
VOMITING: 0
BLOOD IN STOOL: 0
DIARRHEA: 0
CONSTIPATION: 0
TROUBLE SWALLOWING: 0
SORE THROAT: 0
COLOR CHANGE: 0

## 2021-02-17 NOTE — PROGRESS NOTES
BARIATRIC SURGERY POST OPERATIVE NOTE    SUBJECTIVE:    Patient presenting today referred from Adrienne Benitez MD, for   Chief Complaint   Patient presents with    Follow-up     HFU 3rd  visit converting to RYGB     /78   Pulse 68   Temp 96.7 °F (35.9 °C)   Ht 5' 4.25\" (1.632 m)   Wt 272 lb 12.8 oz (123.7 kg)   SpO2 98%   BMI 46.46 kg/m²      HPI: Jolene Chin is a 44 y.o. female  THE ERCP IS NOT NEEDED, for the rest of her life, and she is convinced, she lost 22 Lbs over the last month. Eats celery a lot , and prtn powder, and smoothies, AND will see her NEW PCP in am.    Addressed the status of the following co-morbidities:   1. GERD improving. 2. Arthritis  worsening. 3. Nausea and vomiting  improving. Current Outpatient Medications:     hydrOXYzine (VISTARIL) 50 MG capsule, Take 1 capsule by mouth every 6 hours as needed for Anxiety, Disp: 20 capsule, Rfl: 0    albuterol sulfate  (90 Base) MCG/ACT inhaler, Inhale 2 puffs into the lungs 4 times daily, Disp: 1 Inhaler, Rfl: 5    promethazine (PHENERGAN) 25 MG tablet, Take 1 tablet by mouth every 8 hours as needed for Nausea, Disp: 30 tablet, Rfl: 3    ferrous sulfate (IRON 325) 325 (65 Fe) MG tablet, Take 1 tablet by mouth daily (with breakfast), Disp: 90 tablet, Rfl: 5    levETIRAcetam (KEPPRA) 1000 MG tablet, Take 1 tablet by mouth 2 times daily, Disp: 60 tablet, Rfl: 5    dicyclomine (BENTYL) 10 MG capsule, Take 1 capsule by mouth 3 times daily as needed (abdominal pain), Disp: 21 capsule, Rfl: 3    oxyCODONE-acetaminophen (PERCOCET) 5-325 MG per tablet, Take 1 tablet by mouth.  Every 4-6 hours PRN, Disp: , Rfl:     Cholecalciferol (VITAMIN D3) 5000 units TABS, Take 1 tablet by mouth daily, Disp: , Rfl:     estradiol (ESTRACE) 0.5 MG tablet, Take 0.5 mg by mouth daily, Disp: , Rfl:     atenolol (TENORMIN) 25 MG tablet, Take 25 mg by mouth daily, Disp: , Rfl:     Multiple Vitamin (MVI, BARIATRIC ADVANTAGE PORT INSERTION performed by Jenna Coughlin MD at [address] [facility]    removed after 6 months    LUNG REMOVAL, PARTIAL Left 2008    Benign    OTHER SURGICAL HISTORY  02/1998    \"Tubes And Ovaries Removed, Appendectomy Also Done\"    OTHER SURGICAL HISTORY  Last Done 7-15-16    Mediport Insertion \"Total Of Six Done, Removed Last Mediport In 2014\"    PORT SURGERY N/A 1/15/2020    PORT REMOVAL performed by Jenna Coughlin MD at [facility] N/A 7/6/2020    PORT INSERTION performed by Jenna Coughlin MD at [facility] N/A 2/12/2019    GASTRECTOMY SLEEVE LAPAROSCOPIC ROBOTIC performed by Jenna Coughlin MD at [address] [place] [address]  08/27/2018    UPPER GASTROINTESTINAL ENDOSCOPY N/A 4/2/2019    EGD CONTROL HEMORRHAGE with epi injection at bleeding site performed by Jenna Coughlin MD at [address] [place] [address] N/A 6/19/2019    EGD DIAGNOSTIC ONLY performed by Jenna Coughlin MD at [address] [place] [address] N/A 7/22/2019    EGD DIAGNOSTIC ONLY performed by Daryl Munoz MD at [address] [place] [address] N/A 10/14/2019    EGD DIAGNOSTIC ONLY performed by Jenna Coughlin MD at [address] [place] [address] N/A 7/17/2020    EGJ DILATATION BALLOON WITH 18-20 MM BALLOON, DILATED TO 20 MM. performed by Jenna Coughlin MD at [facility] History     Socioeconomic History    Marital status:      Spouse name: None    Number of children: None    Years of education: None    Highest education level: None   Occupational History    None   Social Needs    Financial resource strain: None    Food insecurity     Worry: None     Inability: None    Transportation needs     Medical: None     Non-medical: None   Tobacco Use    Smoking status: Never Smoker    Smokeless tobacco: Never Used Substance and Sexual Activity    Alcohol use: No     Alcohol/week: 0.0 standard drinks    Drug use: No    Sexual activity: Not Currently   Lifestyle    Physical activity     Days per week: None     Minutes per session: None    Stress: None   Relationships    Social connections     Talks on phone: None     Gets together: None     Attends [employer] service: None     Active member of club or organization: None     Attends meetings of clubs or organizations: None     Relationship status: None    Intimate partner violence     Fear of current or ex partner: None     Emotionally abused: None     Physically abused: None     Forced sexual activity: None   Other Topics Concern    None   Social History Narrative    None     Family History   Problem Relation Age of Onset    Diabetes Mother         \"Borderline Diabetes\"    High Blood Pressure Mother     Obesity Mother     Arthritis Mother     Heart Disease Mother     High Cholesterol Mother     Vision Loss Mother     Diabetes Father     High Blood Pressure Father     Asthma Father     Cancer Father         prostate cancer    High Blood Pressure Brother     Asthma Son     Vision Loss Son     Lupus Daughter     Other Daughter         \"Alot Of Female Problems\"     Review of Systems   Constitutional: Positive for fatigue. Negative for activity change, chills, diaphoresis and fever. HENT: Negative for sore throat, trouble swallowing and voice change. Eyes: Negative for photophobia and visual disturbance. Respiratory: Positive for shortness of breath. Negative for cough and wheezing. Cardiovascular: Positive for leg swelling. Negative for chest pain and palpitations. Gastrointestinal: Positive for abdominal distention and abdominal pain. Negative for anal bleeding, blood in stool, constipation, diarrhea, nausea and vomiting. Endocrine: Positive for polyphagia. Negative for cold intolerance, heat intolerance, polydipsia and polyuria. nerve deficit. Coordination: Coordination normal.   Psychiatric:         Behavior: Behavior normal.         Thought Content: Thought content normal.         Judgment: Judgment normal.         Assessment / Plan:    1. Morbid obesity with BMI of 45.0-49.9, adult [facility]      9/24/2020: Impression   Postsurgical changes from sleeve gastrectomy without evidence of acute   complication.  No stricture or leak.         Got a second opinion for her ERCP, and NONE is needed, so   Will proceed with the RYGB, soon, within 3 months. Follow Up:  Return in about 4 weeks (around 3/17/2021) for Bariatric follow up: diet, exercise & weight loss, Follow up Symptoms.     Tiff Rey MD, FACS, FICS  Member of the [address] of [employer] and [employer]    [phone]    2/17/21

## 2021-03-03 ENCOUNTER — HOSPITAL ENCOUNTER (INPATIENT)
Age: 51
LOS: 5 days | Discharge: HOME | DRG: 603 | End: 2021-03-08
Attending: EMERGENCY MEDICINE | Admitting: INTERNAL MEDICINE
Payer: COMMERCIAL

## 2021-03-03 ENCOUNTER — APPOINTMENT (OUTPATIENT)
Dept: GENERAL RADIOLOGY | Age: 51
DRG: 603 | End: 2021-03-03
Payer: COMMERCIAL

## 2021-03-03 DIAGNOSIS — R10.9 CHRONIC ABDOMINAL PAIN: ICD-10-CM

## 2021-03-03 DIAGNOSIS — R11.2 INTRACTABLE VOMITING WITH NAUSEA, UNSPECIFIED VOMITING TYPE: Primary | ICD-10-CM

## 2021-03-03 DIAGNOSIS — G89.29 CHRONIC ABDOMINAL PAIN: ICD-10-CM

## 2021-03-03 DIAGNOSIS — L03.116 CELLULITIS OF LEFT LOWER EXTREMITY: ICD-10-CM

## 2021-03-03 PROBLEM — Z01.818 PRE-OPERATIVE CLEARANCE: Status: RESOLVED | Noted: 2021-02-01 | Resolved: 2021-03-03

## 2021-03-03 LAB
ALBUMIN SERPL-MCNC: 3.7 GM/DL (ref 3.4–5)
ALP BLD-CCNC: 82 IU/L (ref 40–129)
ALT SERPL-CCNC: 14 U/L (ref 10–40)
ANION GAP SERPL CALCULATED.3IONS-SCNC: 6 MMOL/L (ref 4–16)
AST SERPL-CCNC: 17 IU/L (ref 15–37)
BACTERIA: NEGATIVE /HPF
BASOPHILS ABSOLUTE: 0 K/CU MM
BASOPHILS RELATIVE PERCENT: 0.7 % (ref 0–1)
BILIRUB SERPL-MCNC: 0.2 MG/DL (ref 0–1)
BILIRUBIN URINE: NEGATIVE MG/DL
BLOOD, URINE: NEGATIVE
BUN BLDV-MCNC: 15 MG/DL (ref 6–23)
CALCIUM OXALATE CRYSTALS: ABNORMAL /HPF
CALCIUM SERPL-MCNC: 9.5 MG/DL (ref 8.3–10.6)
CHLORIDE BLD-SCNC: 107 MMOL/L (ref 99–110)
CLARITY: ABNORMAL
CO2: 26 MMOL/L (ref 21–32)
COLOR: YELLOW
CREAT SERPL-MCNC: 0.8 MG/DL (ref 0.6–1.1)
DIFFERENTIAL TYPE: ABNORMAL
EOSINOPHILS ABSOLUTE: 0.1 K/CU MM
EOSINOPHILS RELATIVE PERCENT: 3.1 % (ref 0–3)
ERYTHROCYTE SEDIMENTATION RATE: 25 MM/HR (ref 0–30)
GFR AFRICAN AMERICAN: >60 ML/MIN/1.73M2
GFR NON-AFRICAN AMERICAN: >60 ML/MIN/1.73M2
GLUCOSE BLD-MCNC: 84 MG/DL (ref 70–99)
GLUCOSE, URINE: NEGATIVE MG/DL
HCT VFR BLD CALC: 32.3 % (ref 37–47)
HEMOGLOBIN: 10.2 GM/DL (ref 12.5–16)
HIGH SENSITIVE C-REACTIVE PROTEIN: 4.7 MG/L
HYALINE CASTS: 10 /LPF
IMMATURE NEUTROPHIL %: 0.2 % (ref 0–0.43)
KETONES, URINE: NEGATIVE MG/DL
LEUKOCYTE ESTERASE, URINE: ABNORMAL
LIPASE: 18 IU/L (ref 13–60)
LYMPHOCYTES ABSOLUTE: 1.6 K/CU MM
LYMPHOCYTES RELATIVE PERCENT: 36.5 % (ref 24–44)
MCH RBC QN AUTO: 25.8 PG (ref 27–31)
MCHC RBC AUTO-ENTMCNC: 31.6 % (ref 32–36)
MCV RBC AUTO: 81.8 FL (ref 78–100)
MONOCYTES ABSOLUTE: 0.3 K/CU MM
MONOCYTES RELATIVE PERCENT: 5.8 % (ref 0–4)
MUCUS: ABNORMAL HPF
NITRITE URINE, QUANTITATIVE: NEGATIVE
NUCLEATED RBC %: 0 %
PDW BLD-RTO: 14.3 % (ref 11.7–14.9)
PH, URINE: 5 (ref 5–8)
PLATELET # BLD: 182 K/CU MM (ref 140–440)
PMV BLD AUTO: 10.1 FL (ref 7.5–11.1)
POTASSIUM SERPL-SCNC: 4.2 MMOL/L (ref 3.5–5.1)
PROTEIN UA: 30 MG/DL
RBC # BLD: 3.95 M/CU MM (ref 4.2–5.4)
RBC URINE: <1 /HPF (ref 0–6)
SEGMENTED NEUTROPHILS ABSOLUTE COUNT: 2.4 K/CU MM
SEGMENTED NEUTROPHILS RELATIVE PERCENT: 53.7 % (ref 36–66)
SODIUM BLD-SCNC: 139 MMOL/L (ref 135–145)
SPECIFIC GRAVITY UA: 1.03 (ref 1–1.03)
SQUAMOUS EPITHELIAL: 7 /HPF
TOTAL IMMATURE NEUTOROPHIL: 0.01 K/CU MM
TOTAL NUCLEATED RBC: 0 K/CU MM
TOTAL PROTEIN: 6.5 GM/DL (ref 6.4–8.2)
TRICHOMONAS: ABNORMAL /HPF
UROBILINOGEN, URINE: NEGATIVE MG/DL (ref 0.2–1)
WBC # BLD: 4.5 K/CU MM (ref 4–10.5)
WBC UA: 7 /HPF (ref 0–5)

## 2021-03-03 PROCEDURE — 86141 C-REACTIVE PROTEIN HS: CPT

## 2021-03-03 PROCEDURE — 81001 URINALYSIS AUTO W/SCOPE: CPT

## 2021-03-03 PROCEDURE — 85652 RBC SED RATE AUTOMATED: CPT

## 2021-03-03 PROCEDURE — 6360000002 HC RX W HCPCS: Performed by: EMERGENCY MEDICINE

## 2021-03-03 PROCEDURE — U0002 COVID-19 LAB TEST NON-CDC: HCPCS

## 2021-03-03 PROCEDURE — 2580000003 HC RX 258: Performed by: EMERGENCY MEDICINE

## 2021-03-03 PROCEDURE — 2500000003 HC RX 250 WO HCPCS: Performed by: EMERGENCY MEDICINE

## 2021-03-03 PROCEDURE — 83690 ASSAY OF LIPASE: CPT

## 2021-03-03 PROCEDURE — 73630 X-RAY EXAM OF FOOT: CPT

## 2021-03-03 PROCEDURE — 96375 TX/PRO/DX INJ NEW DRUG ADDON: CPT

## 2021-03-03 PROCEDURE — 96372 THER/PROPH/DIAG INJ SC/IM: CPT

## 2021-03-03 PROCEDURE — 96376 TX/PRO/DX INJ SAME DRUG ADON: CPT

## 2021-03-03 PROCEDURE — 1200000000 HC SEMI PRIVATE

## 2021-03-03 PROCEDURE — 99285 EMERGENCY DEPT VISIT HI MDM: CPT

## 2021-03-03 PROCEDURE — 96365 THER/PROPH/DIAG IV INF INIT: CPT

## 2021-03-03 PROCEDURE — 80053 COMPREHEN METABOLIC PANEL: CPT

## 2021-03-03 PROCEDURE — 93005 ELECTROCARDIOGRAM TRACING: CPT | Performed by: EMERGENCY MEDICINE

## 2021-03-03 PROCEDURE — 85025 COMPLETE CBC W/AUTO DIFF WBC: CPT

## 2021-03-03 PROCEDURE — 84703 CHORIONIC GONADOTROPIN ASSAY: CPT

## 2021-03-03 PROCEDURE — 87635 SARS-COV-2 COVID-19 AMP PRB: CPT

## 2021-03-03 PROCEDURE — 6360000002 HC RX W HCPCS: Performed by: PHYSICIAN ASSISTANT

## 2021-03-03 RX ORDER — ONDANSETRON 2 MG/ML
4 INJECTION INTRAMUSCULAR; INTRAVENOUS ONCE
Status: COMPLETED | OUTPATIENT
Start: 2021-03-03 | End: 2021-03-03

## 2021-03-03 RX ORDER — ONDANSETRON 4 MG/1
4 TABLET, ORALLY DISINTEGRATING ORAL ONCE
Status: DISCONTINUED | OUTPATIENT
Start: 2021-03-03 | End: 2021-03-03

## 2021-03-03 RX ORDER — MORPHINE SULFATE 4 MG/ML
4 INJECTION, SOLUTION INTRAMUSCULAR; INTRAVENOUS ONCE
Status: COMPLETED | OUTPATIENT
Start: 2021-03-03 | End: 2021-03-03

## 2021-03-03 RX ORDER — PROMETHAZINE HYDROCHLORIDE 25 MG/ML
25 INJECTION, SOLUTION INTRAMUSCULAR; INTRAVENOUS ONCE
Status: COMPLETED | OUTPATIENT
Start: 2021-03-03 | End: 2021-03-03

## 2021-03-03 RX ORDER — ONDANSETRON 2 MG/ML
4 INJECTION INTRAMUSCULAR; INTRAVENOUS EVERY 6 HOURS PRN
Status: DISCONTINUED | OUTPATIENT
Start: 2021-03-03 | End: 2021-03-04

## 2021-03-03 RX ADMIN — PROMETHAZINE HYDROCHLORIDE 25 MG: 25 INJECTION INTRAMUSCULAR; INTRAVENOUS at 22:48

## 2021-03-03 RX ADMIN — ONDANSETRON 4 MG: 2 INJECTION INTRAMUSCULAR; INTRAVENOUS at 20:28

## 2021-03-03 RX ADMIN — CLINDAMYCIN PHOSPHATE 900 MG: 150 INJECTION, SOLUTION INTRAVENOUS at 22:46

## 2021-03-03 RX ADMIN — MORPHINE SULFATE 4 MG: 4 INJECTION, SOLUTION INTRAMUSCULAR; INTRAVENOUS at 21:52

## 2021-03-03 RX ADMIN — MORPHINE SULFATE 4 MG: 4 INJECTION, SOLUTION INTRAMUSCULAR; INTRAVENOUS at 22:47

## 2021-03-03 ASSESSMENT — PAIN DESCRIPTION - ONSET: ONSET: ON-GOING

## 2021-03-03 ASSESSMENT — PAIN DESCRIPTION - PROGRESSION: CLINICAL_PROGRESSION: NOT CHANGED

## 2021-03-03 ASSESSMENT — PAIN DESCRIPTION - LOCATION: LOCATION: ABDOMEN

## 2021-03-03 ASSESSMENT — PAIN SCALES - GENERAL: PAINLEVEL_OUTOF10: 8

## 2021-03-03 ASSESSMENT — PAIN DESCRIPTION - DESCRIPTORS: DESCRIPTORS: STABBING

## 2021-03-03 NOTE — ED PROVIDER NOTES
EMERGENCY DEPARTMENT ENCOUNTER      PCP: Panda Toribio MD    CHIEF COMPLAINT    Chief Complaint   Patient presents with    Wound Check     left lower leg, hx of staph- Dr. concerned for staph again     Emesis    Abdominal Pain     hx od pancreatitis        Of note, this patient was also evaluated by the attending physician, Dr. Cain Chu. HPI    Don Munoz is a 44 y.o. female who presents with the below complaints. Patient complains of right upper abdominal discomfort. Onset several days. Context is patient has a history of chronic episodic abdominal discomfort for \"several years\". She follows with general surgeon, Dr. Donita Keating and states she has a \"procedure planned for the future\". Patient states she had gallstone pancreatitis in the past salting and cholecystectomy. She denies any fevers, upper or lower respiratory symptoms. Patient also complains of a red painful area over the posterior left thigh. Onset approximately 2 weeks. Patient states symptoms started as a \"blister\" and progressed to redness and pain localized to posterior thigh. No streaks. No constitutional symptoms. Patient does note there is slight redness of her left great toe compared to baseline. Patient states she had an infection in her foot in 2020 which resulted in surgery since which time she does not have range of motion of the MCP or IP joint of the left great toe. She denies recent injury. Patient does note history of MRSA. REVIEW OF SYSTEMS    Constitutional:  Denies fever, chills, weight loss or weakness   HENT:  Denies sore throat or ear pain   Cardiovascular:  Denies chest pain, palpitations   Respiratory:  Denies cough or shortness of breath    GI: See HPI. Denies abdominal pain  :  Denies any urinary symptoms or vaginal symptoms. Musculoskeletal:  Denies back pain  Skin: See HPI.   Neurologic:  Denies headache, focal weakness or sensory changes   Endocrine:  Denies polyuria or polydypsia Lymphatic:  Denies swollen glands     All other review of systems are negative  See HPI and nursing notes for additional information     PAST MEDICAL AND SURGICAL HISTORY    Past Medical History:   Diagnosis Date    Acid reflux     Anemia     Anxiety     Arthritis     Hands, Back And Ankles    Bleeding ulcer 2014    \"I Had Ulcers In My Stomach And Colon\"/ per pt on 8/12/2019\"they said recently having some blood in my stomach- in July ( 2019)could not find where coming from \"    Bronchitis Last Episode 2014    Chronic back pain     [facility]     [facility]    COPD (chronic obstructive pulmonary disease) ([facility] [address]     Sees Dr. Clint Sanders Depression     Fibromyalgia Dx 2013    GERD (gastroesophageal reflux disease)     H/O echocardiogram 08/11/2015    EF >55%. LA to be at the upper limit of normal in size. LV hypertrophy with normal LV systolic, but abnormal diastolic function. Normal valvular structures and function.  H/O echocardiogram 08/30/2018    EF 55-60%    Hiatal hernia     History of blood transfusion 09/2015 And 2018    No Reaction To Blood Transfusions Received    History of sleeve gastrectomy     [place] (hard of hearing)     Right Ear    Hx of cardiac catheterization 10/24/2010    Angiographically normal coronary arteries w/ normal LV function and wall motion.  Hx of transesophageal echocardiography (PILO) for monitoring 12/02/2010    EF 55-60%. WNL.    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 name   Jad Shipman cysts     \"they found that I have a kidney cyst but not sure which side\" per pt on 7/15/2020    Lupus ([facility] [address] Dx 2013    \"Rheumatoid Lupus\"    MDRO (multiple drug resistant organisms) resistance     4 [facility]    Morbid obesity ([facility] [address]     MRSA bacteremia     Nausea     \"Most Of The Time\"    Osteomyelitis of fifth toe of left foot (HCC)     Pain management     Sees Dr. Jeffrey Bolanos, Once A Month    Pancreatitis chronic Dx 2001    Pneumonia Dx 11-15    Seizures ([address]     Shortness of breath on exertion     Shortness of breath on exertion     Sleep apnea     Has CPAP\"no longer use the cpap since lost weight\"    Staph infection Dx 11-15    Left Foot    Staph infection Dx 11-15    \"Left Foot\"    Teeth missing     Upper And Lower    Thyroid disease     Wears glasses     To Read     Past Surgical History:   Procedure Laterality Date    APPENDECTOMY  02/1998    Done When Tubes And Ovaries Were Removed    CARDIAC CATHETERIZATION  10/24/2010    CATHETER REMOVAL N/A 7/16/2019    PORT REMOVAL performed by Екатреина Jarvis MD at [address] [facility]  08/27/1991    CHOLECYSTECTOMY, LAPAROSCOPIC  1990's    COLONOSCOPY  Last Done In 2000's    DENTAL SURGERY      Teeth Extracted In Past    ENDOSCOPY, COLON, DIAGNOSTIC  Last Done In 2018    FOOT DEBRIDEMENT Left 6/16/2019    FIRST METATARSAL DEBRIDEMENT INCISION AND DRAINAGE. EXCISION OF ULCER.  RESECTION OF BONE. 1ST METATARSAL POWER LAVAGE AND BONE CEMENT performed by Orlando Mari DPM at [address] Left Last Done In 2016     Dr. Areli Robledo, \" About 6 Surgeries Done Because Of Staph Infection\"    HIATAL HERNIA REPAIR N/A 2/12/2019    HERNIA HIATAL LAPAROSCOPIC ROBOTIC performed by Екатерина Jarvis MD at [facility]  10/1997    Partial Abdominal Hysterectomy    INSERTION / REMOVAL / REPLACEMENT VENOUS ACCESS CATHETER N/A 8/15/2019    PORT INSERTION performed by Екатерина Jarvis MD at [address]  ~2000    removed after 6 months    LEG BIOPSY EXCISION Left 3/8/2021    LEFT THIGH LESION BIOPSY EXCISION performed by Екатерина Jarvis MD at [address] [facility] REMOVAL, PARTIAL Left 2008    Benign    OTHER SURGICAL HISTORY  02/1998    \"Tubes And Ovaries Removed, Appendectomy Also Done\"    OTHER SURGICAL HISTORY  Last Done 7-15-16    Mediport Insertion \"Total Of Six Done, Removed Last Mediport In 2014\"    PORT SURGERY N/A 1/15/2020    PORT REMOVAL performed by Kleber Toribio MD at [address] [facility] N/A 7/6/2020    PORT INSERTION performed by Kleber Toribio MD at [address]  N/A 2/12/2019    GASTRECTOMY SLEEVE LAPAROSCOPIC ROBOTIC performed by Kleber Toribio MD at [address] [facility]  08/27/2018    UPPER GASTROINTESTINAL ENDOSCOPY N/A 4/2/2019    EGD CONTROL HEMORRHAGE with epi injection at bleeding site performed by Kleber Toribio MD at [address] 6/19/2019    EGD DIAGNOSTIC ONLY performed by Kleber Toribio MD at [address] [facility] N/A 7/22/2019    EGD DIAGNOSTIC ONLY performed by Luiz Baez MD at [address] [facility] N/A 10/14/2019    EGD DIAGNOSTIC ONLY performed by Kleber Troibio MD at [address] [place] [address] [employer] N/A 7/17/2020    EGJ DILATATION BALLOON WITH 18-20 MM BALLOON, DILATED TO 20 MM. performed by Kleber Toribio MD at [address]    Current Outpatient Rx   Medication Sig Dispense Refill    meclizine (ANTIVERT) 12.5 MG tablet Take 1 tablet by mouth 3 times daily as needed for Dizziness 15 tablet 0    clindamycin (CLEOCIN) 300 MG capsule Take 1 capsule by mouth 4 times daily for 7 days 28 capsule 0    hydrOXYzine (VISTARIL) 50 MG capsule Take 1 capsule by mouth every 6 hours as needed for Anxiety 20 capsule 0    promethazine (PHENERGAN) 25 MG tablet Take 1 tablet by mouth every 8 hours as needed for Nausea 30 tablet 3    ferrous sulfate (IRON 325) 325 (65 Fe) MG tablet Take 1 tablet Worry: None     Inability: None    Transportation needs     Medical: None     Non-medical: None   Tobacco Use    Smoking status: Never Smoker    Smokeless tobacco: Never Used   Substance and Sexual Activity    Alcohol use: No     Alcohol/week: 0.0 standard drinks    Drug use: No    Sexual activity: Not Currently   Lifestyle    Physical activity     Days per week: None     Minutes per session: None    Stress: None   Relationships    Social connections     Talks on phone: None     Gets together: None     Attends Restoration service: None     Active member of club or organization: None     Attends meetings of clubs or organizations: None     Relationship status: None    Intimate partner violence     Fear of current or ex partner: None     Emotionally abused: None     Physically abused: None     Forced sexual activity: None   Other Topics Concern    None   Social History Narrative    None     Family History   Problem Relation Age of Onset    Diabetes Mother         \"Borderline Diabetes\"    High Blood Pressure Mother     Obesity Mother     Arthritis Mother     Heart Disease Mother     High Cholesterol Mother     Vision Loss Mother     Diabetes Father     High Blood Pressure Father     Asthma Father     Cancer Father         prostate cancer    High Blood Pressure Brother     Asthma Son     Vision Loss Son     Lupus Daughter     Other Daughter         \"Alot Of Female Problems\"         PHYSICAL EXAM    VITAL SIGNS: BP (!) 128/91   Pulse 52   Temp 97.6 °F (36.4 °C) (Oral)   Resp (!) 31   Ht 5' 4.25\" (1.632 m)   Wt 285 lb (129.3 kg)   SpO2 100%   BMI 48.54 kg/m²    Constitutional: Obese, no distress  HENT:  Normocephalic, Atraumatic, PERRL. EOMI. Sclera clear. Conjunctiva normal, No discharge. Neck/Lymphatics: supple, no JVD, no swollen nodes  Cardiovascular:   RRR,  no murmurs/rubs/gallops. No JVD  No carotid bruits or murmurs heard in carotids. Respiratory:  Nonlabored breathing. Normal breath sounds, No wheezing  Abdomen: Obese habitus-abdominal exam somewhat limited. Bowel sounds normal, Soft, no rigidity, distention. Patient does have mild-moderate tenderness in the right upper quadrant. No organomegaly. No rebound tenderness. No masses. Musculoskeletal:    Over the left posterior thigh, there is localized erythema, annular shaped with central sanguinous scab measuring approximately dime size. Surrounding erythema is total of 3 cm in diameter. There is no streaks, underlying induration or fluctuance. No palpable cord. There is no  asymmetry, or calf / thigh tenderness bilaterally. No cyanosis. No cool or pale-appearing limb. Distal cap refill and pulses intact bilateral upper and lower extremities  Bilateral upper and lower extremity ROM intact without pain or obvious deficit  Left foot exam: There is mild pinkish hue to left hallux. No focal mass, induration or fluctuance. No tenderness to palpation. No warmth. Capillary refill intact. Unable to move left great toe at IP joint-patient states this is baseline since surgery last year for \"foot infection\". Integument:  Warm, Dry  Neurologic: Alert & oriented , No focal deficits noted. Cranial nerves II through XII grossly intact. Normal gross motor coordination & motor strength bilateral upper and lower extremities  Sensation intact.   Psychiatric:  Affect normal, Mood normal.       Labs:  Results for orders placed or performed during the hospital encounter of 03/03/21   Culture, Blood 1    Specimen: Blood gases   Result Value Ref Range    Specimen BLOOD     Special Requests NONE     Culture NO GROWTH AT 5 DAYS    Culture, Blood 2    Specimen: Blood gases   Result Value Ref Range    Specimen BLOOD     Special Requests NONE     Culture NO GROWTH AT 5 DAYS    COVID-19, Rapid    Specimen: Nasopharyngeal   Result Value Ref Range    Source THROAT     SARS-CoV-2, NAAT NOT DETECTED    Culture, MRSA, Screening Specimen: Nasal   Result Value Ref Range    Specimen NASAL     Special Requests NONE     Culture       Final Report No Staph aureus MRSA isolated by culture. No Staph aureus MSSA isolated by culture.    Culture, Surgical    Specimen: Leg   Result Value Ref Range    Specimen LEG LT THIGH     Special Requests NONE     Culture       Prelim Report Anaerobic culture further report to follow    Culture (A)      STAPHYLOCOCCUS EPIDERMIDIS Rare growth No further workup   Culture with Smear, Acid Fast Bacillius    Specimen: Leg   Result Value Ref Range    Specimen LEG LT THIGH     Special Requests NONE     AFB Smear No AFB observed by Fluorescent stain    CBC Auto Differential   Result Value Ref Range    WBC 4.5 4.0 - 10.5 K/CU MM    RBC 3.95 (L) 4.2 - 5.4 M/CU MM    Hemoglobin 10.2 (L) 12.5 - 16.0 GM/DL    Hematocrit 32.3 (L) 37 - 47 %    MCV 81.8 78 - 100 FL    MCH 25.8 (L) 27 - 31 PG    MCHC 31.6 (L) 32.0 - 36.0 %    RDW 14.3 11.7 - 14.9 %    Platelets 263 732 - 824 K/CU MM    MPV 10.1 7.5 - 11.1 FL    Differential Type AUTOMATED DIFFERENTIAL     Segs Relative 53.7 36 - 66 %    Lymphocytes % 36.5 24 - 44 %    Monocytes % 5.8 (H) 0 - 4 %    Eosinophils % 3.1 (H) 0 - 3 %    Basophils % 0.7 0 - 1 %    Segs Absolute 2.4 K/CU MM    Lymphocytes Absolute 1.6 K/CU MM    Monocytes Absolute 0.3 K/CU MM    Eosinophils Absolute 0.1 K/CU MM    Basophils Absolute 0.0 K/CU MM    Nucleated RBC % 0.0 %    Total Nucleated RBC 0.0 K/CU MM    Total Immature Neutrophil 0.01 K/CU MM    Immature Neutrophil % 0.2 0 - 0.43 %   Comprehensive Metabolic Panel   Result Value Ref Range    Sodium 139 135 - 145 MMOL/L    Potassium 4.2 3.5 - 5.1 MMOL/L    Chloride 107 99 - 110 mMol/L    CO2 26 21 - 32 MMOL/L    BUN 15 6 - 23 MG/DL    CREATININE 0.8 0.6 - 1.1 MG/DL    Glucose 84 70 - 99 MG/DL    Calcium 9.5 8.3 - 10.6 MG/DL    Albumin 3.7 3.4 - 5.0 GM/DL    Total Protein 6.5 6.4 - 8.2 GM/DL    Total Bilirubin 0.2 0.0 - 1.0 MG/DL    ALT 14 10 - 40 U/L AST 17 15 - 37 IU/L    Alkaline Phosphatase 82 40 - 129 IU/L    GFR Non-African American >60 >60 mL/min/1.73m2    GFR African American >60 >60 mL/min/1.73m2    Anion Gap 6 4 - 16   Urinalysis   Result Value Ref Range    Color, UA YELLOW YELLOW    Clarity, UA SLIGHTLY CLOUDY (A) CLEAR    Glucose, Urine NEGATIVE NEGATIVE MG/DL    Bilirubin Urine NEGATIVE NEGATIVE MG/DL    Ketones, Urine NEGATIVE NEGATIVE MG/DL    Specific Gravity, UA 1.030 1.001 - 1.035    Blood, Urine NEGATIVE NEGATIVE    pH, Urine 5.0 5.0 - 8.0    Protein, UA 30 (A) NEGATIVE MG/DL    Urobilinogen, Urine NEGATIVE 0.2 - 1.0 MG/DL    Nitrite Urine, Quantitative NEGATIVE NEGATIVE    Leukocyte Esterase, Urine TRACE (A) NEGATIVE    RBC, UA <1 0 - 6 /HPF    WBC, UA 7 (H) 0 - 5 /HPF    Bacteria, UA NEGATIVE NEGATIVE /HPF    Squam Epithel, UA 7 /HPF    Mucus, UA MANY (A) NEGATIVE HPF    Trichomonas, UA NONE SEEN NONE SEEN /HPF    Hyaline Casts, UA 10 /LPF    Ca Oxalate Zoila, UA MANY /HPF   Lipase   Result Value Ref Range    Lipase 18 13 - 60 IU/L   Sedimentation Rate   Result Value Ref Range    Sed Rate 25 0 - 30 MM/HR   C-Reactive Protein   Result Value Ref Range    CRP, High Sensitivity 4.7 mg/L   Comprehensive Metabolic Panel w/ Reflex to MG   Result Value Ref Range    Sodium 142 135 - 145 MMOL/L    Potassium 4.2 3.5 - 5.1 MMOL/L    Chloride 107 99 - 110 mMol/L    CO2 27 21 - 32 MMOL/L    BUN 9 6 - 23 MG/DL    CREATININE 0.9 0.6 - 1.1 MG/DL    Glucose 81 70 - 99 MG/DL    Calcium 9.2 8.3 - 10.6 MG/DL    Albumin 3.9 3.4 - 5.0 GM/DL    Total Protein 6.3 (L) 6.4 - 8.2 GM/DL    Total Bilirubin 0.2 0.0 - 1.0 MG/DL    ALT 16 10 - 40 U/L    AST 17 15 - 37 IU/L    Alkaline Phosphatase 95 40 - 128 IU/L    GFR Non-African American >60 >60 mL/min/1.73m2    GFR African American >60 >60 mL/min/1.73m2    Anion Gap 8 4 - 16   CBC auto differential   Result Value Ref Range    WBC 3.4 (L) 4.0 - 10.5 K/CU MM    RBC 4.08 (L) 4.2 - 5.4 M/CU MM    Hemoglobin 10.3 (L) 12.5 - 16.0 GM/DL    Hematocrit 34.1 (L) 37 - 47 %    MCV 83.6 78 - 100 FL    MCH 25.2 (L) 27 - 31 PG    MCHC 30.2 (L) 32.0 - 36.0 %    RDW 14.2 11.7 - 14.9 %    Platelets 052 371 - 195 K/CU MM    MPV 9.7 7.5 - 11.1 FL    Differential Type AUTOMATED DIFFERENTIAL     Segs Relative 42.1 36 - 66 %    Lymphocytes % 40.3 24 - 44 %    Monocytes % 12.8 (H) 0 - 4 %    Eosinophils % 3.9 (H) 0 - 3 %    Basophils % 0.6 0 - 1 %    Segs Absolute 1.4 K/CU MM    Lymphocytes Absolute 1.4 K/CU MM    Monocytes Absolute 0.4 K/CU MM    Eosinophils Absolute 0.1 K/CU MM    Basophils Absolute 0.0 K/CU MM    Nucleated RBC % 0.0 %    Total Nucleated RBC 0.0 K/CU MM    Total Immature Neutrophil 0.01 K/CU MM    Immature Neutrophil % 0.3 0 - 0.43 %   IgG, IgA, IgM   Result Value Ref Range    IgG, Serum 877 723 - 1,685 MG/DL    IgA 108 69 - 382 MG/DL    IgM,Serum 125 62 - 277 MG/DL   1,3 Beta-D-Glucan   Result Value Ref Range    1,3 Beta-D-Glucan 76 pg/mL    1,3 Beta-D-Glucan Interp INDETERMINATE (A) Negative   Vancomycin, trough   Result Value Ref Range    Vancomycin Tr ? 10 - 20 UG/ML    DOSE AMOUNT DOSE AMT. GIVEN - 1000 mg     DOSE TIME DOSE TIME GIVEN - 1330    Vancomycin, trough   Result Value Ref Range    Vancomycin Tr 25.9 (HH) 10 - 20 UG/ML    DOSE AMOUNT DOSE AMT. GIVEN - 1000 MG     DOSE TIME DOSE TIME GIVEN - 3603    Basic Metabolic Panel   Result Value Ref Range    Sodium 138 135 - 145 MMOL/L    Potassium 4.6 3.5 - 5.1 MMOL/L    Chloride 106 99 - 110 mMol/L    CO2 30 21 - 32 MMOL/L    Anion Gap 2 (L) 4 - 16    BUN 12 6 - 23 MG/DL    CREATININE 0.9 0.6 - 1.1 MG/DL    Glucose 79 70 - 99 MG/DL    Calcium 9.5 8.3 - 10.6 MG/DL    GFR Non-African American >60 >60 mL/min/1.73m2    GFR African American >60 >60 mL/min/1.73m2   Vancomycin, trough   Result Value Ref Range    Vancomycin Tr 23.4 (H) 10 - 20 UG/ML    DOSE AMOUNT DOSE AMT.  GIVEN - 1gm     DOSE TIME DOSE TIME GIVEN - 3145    Basic Metabolic Panel   Result Value Ref Range    Sodium 142 135 - 145 MMOL/L    Potassium 4.8 3.5 - 5.1 MMOL/L    Chloride 108 99 - 110 mMol/L    CO2 28 21 - 32 MMOL/L    Anion Gap 6 4 - 16    BUN 16 6 - 23 MG/DL    CREATININE 0.9 0.6 - 1.1 MG/DL    Glucose 86 70 - 99 MG/DL    Calcium 9.4 8.3 - 10.6 MG/DL    GFR Non-African American >60 >60 mL/min/1.73m2    GFR African American >60 >60 mL/min/1.73m2   POCT Glucose   Result Value Ref Range    POC Glucose 102 (H) 70 - 99 MG/DL   EKG 12 Lead   Result Value Ref Range    Ventricular Rate 70 BPM    Atrial Rate 70 BPM    P-R Interval 190 ms    QRS Duration 80 ms    Q-T Interval 386 ms    QTc Calculation (Bazett) 416 ms    P Axis 43 degrees    R Axis -9 degrees    T Axis 31 degrees    Diagnosis       Normal sinus rhythm  Normal ECG  When compared with ECG of 10-FEB-2021 11:05,  No significant change was found  Confirmed by OPAL Thompson ([id number]) on 3/4/2021 5:38:38 PM             RADIOLOGY    CT ABDOMEN PELVIS WO CONTRAST Additional Contrast? Oral   Final Result   1. No acute intra-abdominal or intrapelvic process. 2. Punctate nonobstructing left renal calculus. 3. Stable bibasilar pulmonary nodules measuring up to 0.5 cm. VL DUP LOWER EXTREMITY VENOUS LEFT   Final Result   No evidence of DVT in the left lower extremity. XR CHEST PORTABLE   Final Result      No acute intrathoracic pathology. Mild cardiomegaly. XR FOOT LEFT (MIN 3 VIEWS)   Final Result   No acute bony or joint abnormality      Soft tissue swelling surrounding the great toe                   ED COURSE & MEDICAL DECISION MAKING       Patient presents as above with chronic episodic abdominal pain as well as skin wound on left leg. IV clindamycin given in ED. Patient given symptomatic treatment and serial rechecks done by supervising physician. CT today reveals no evidence of intra-abdominal emergent process. On Serial rechecks, patient continues to have nausea.   Patient admitted for symptomatic treatment, IV antibiotics for leg cellulitis. Patient case discussed with hospitalist by supervising physician-see his note for details. Clinical  IMPRESSION    1. Intractable vomiting with nausea, unspecified vomiting type    2. Cellulitis of left lower extremity    3. Chronic abdominal pain        Pt admitted. Comment: Please note this report has been produced using speech recognition software and may contain errors related to that system including errors in grammar, punctuation, and spelling, as well as words and phrases that may be inappropriate. If there are any questions or concerns please feel free to contact the dictating provider for clarification.          Don [place]ge[place], [place]  03/10/21 [id number]

## 2021-03-04 ENCOUNTER — APPOINTMENT (OUTPATIENT)
Dept: ULTRASOUND IMAGING | Age: 51
DRG: 603 | End: 2021-03-04
Payer: COMMERCIAL

## 2021-03-04 ENCOUNTER — APPOINTMENT (OUTPATIENT)
Dept: GENERAL RADIOLOGY | Age: 51
DRG: 603 | End: 2021-03-04
Payer: COMMERCIAL

## 2021-03-04 ENCOUNTER — APPOINTMENT (OUTPATIENT)
Dept: CT IMAGING | Age: 51
DRG: 603 | End: 2021-03-04
Payer: COMMERCIAL

## 2021-03-04 LAB
SARS-COV-2, NAAT: NOT DETECTED
SOURCE: NORMAL

## 2021-03-04 PROCEDURE — 6370000000 HC RX 637 (ALT 250 FOR IP): Performed by: PHYSICIAN ASSISTANT

## 2021-03-04 PROCEDURE — 87040 BLOOD CULTURE FOR BACTERIA: CPT

## 2021-03-04 PROCEDURE — 36415 COLL VENOUS BLD VENIPUNCTURE: CPT

## 2021-03-04 PROCEDURE — 2580000003 HC RX 258: Performed by: INTERNAL MEDICINE

## 2021-03-04 PROCEDURE — 74176 CT ABD & PELVIS W/O CONTRAST: CPT

## 2021-03-04 PROCEDURE — 93971 EXTREMITY STUDY: CPT

## 2021-03-04 PROCEDURE — 6360000002 HC RX W HCPCS: Performed by: INTERNAL MEDICINE

## 2021-03-04 PROCEDURE — 99221 1ST HOSP IP/OBS SF/LOW 40: CPT | Performed by: SURGERY

## 2021-03-04 PROCEDURE — 93010 ELECTROCARDIOGRAM REPORT: CPT | Performed by: INTERNAL MEDICINE

## 2021-03-04 PROCEDURE — 99222 1ST HOSP IP/OBS MODERATE 55: CPT | Performed by: INTERNAL MEDICINE

## 2021-03-04 PROCEDURE — 1200000000 HC SEMI PRIVATE

## 2021-03-04 PROCEDURE — 2500000003 HC RX 250 WO HCPCS: Performed by: INTERNAL MEDICINE

## 2021-03-04 PROCEDURE — 6370000000 HC RX 637 (ALT 250 FOR IP): Performed by: INTERNAL MEDICINE

## 2021-03-04 PROCEDURE — 71045 X-RAY EXAM CHEST 1 VIEW: CPT

## 2021-03-04 RX ORDER — FERROUS SULFATE 325(65) MG
325 TABLET ORAL
Status: DISCONTINUED | OUTPATIENT
Start: 2021-03-04 | End: 2021-03-08 | Stop reason: HOSPADM

## 2021-03-04 RX ORDER — TIZANIDINE 4 MG/1
4 TABLET ORAL 2 TIMES DAILY PRN
Status: DISCONTINUED | OUTPATIENT
Start: 2021-03-04 | End: 2021-03-08 | Stop reason: HOSPADM

## 2021-03-04 RX ORDER — DICYCLOMINE HYDROCHLORIDE 10 MG/1
10 CAPSULE ORAL 3 TIMES DAILY PRN
Status: DISCONTINUED | OUTPATIENT
Start: 2021-03-04 | End: 2021-03-08 | Stop reason: HOSPADM

## 2021-03-04 RX ORDER — ONDANSETRON 4 MG/1
4 TABLET, ORALLY DISINTEGRATING ORAL EVERY 8 HOURS PRN
Status: DISCONTINUED | OUTPATIENT
Start: 2021-03-04 | End: 2021-03-08 | Stop reason: HOSPADM

## 2021-03-04 RX ORDER — SODIUM CHLORIDE 0.9 % (FLUSH) 0.9 %
10 SYRINGE (ML) INJECTION PRN
Status: DISCONTINUED | OUTPATIENT
Start: 2021-03-04 | End: 2021-03-08 | Stop reason: HOSPADM

## 2021-03-04 RX ORDER — MORPHINE SULFATE 2 MG/ML
2 INJECTION, SOLUTION INTRAMUSCULAR; INTRAVENOUS EVERY 4 HOURS PRN
Status: DISCONTINUED | OUTPATIENT
Start: 2021-03-04 | End: 2021-03-08 | Stop reason: HOSPADM

## 2021-03-04 RX ORDER — GABAPENTIN 400 MG/1
800 CAPSULE ORAL 3 TIMES DAILY
Status: DISCONTINUED | OUTPATIENT
Start: 2021-03-04 | End: 2021-03-08 | Stop reason: HOSPADM

## 2021-03-04 RX ORDER — LEVETIRACETAM 500 MG/1
1000 TABLET ORAL 2 TIMES DAILY
Status: DISCONTINUED | OUTPATIENT
Start: 2021-03-04 | End: 2021-03-08 | Stop reason: HOSPADM

## 2021-03-04 RX ORDER — OXYCODONE HYDROCHLORIDE AND ACETAMINOPHEN 5; 325 MG/1; MG/1
1 TABLET ORAL EVERY 8 HOURS PRN
Status: DISCONTINUED | OUTPATIENT
Start: 2021-03-04 | End: 2021-03-07

## 2021-03-04 RX ORDER — HYDROXYZINE PAMOATE 25 MG/1
50 CAPSULE ORAL EVERY 6 HOURS PRN
Status: DISCONTINUED | OUTPATIENT
Start: 2021-03-04 | End: 2021-03-08 | Stop reason: HOSPADM

## 2021-03-04 RX ORDER — SODIUM CHLORIDE 0.9 % (FLUSH) 0.9 %
10 SYRINGE (ML) INJECTION EVERY 12 HOURS SCHEDULED
Status: DISCONTINUED | OUTPATIENT
Start: 2021-03-04 | End: 2021-03-08 | Stop reason: HOSPADM

## 2021-03-04 RX ORDER — ACETAMINOPHEN 650 MG/1
650 SUPPOSITORY RECTAL EVERY 6 HOURS PRN
Status: DISCONTINUED | OUTPATIENT
Start: 2021-03-04 | End: 2021-03-08 | Stop reason: HOSPADM

## 2021-03-04 RX ORDER — MORPHINE SULFATE 4 MG/ML
2 INJECTION, SOLUTION INTRAMUSCULAR; INTRAVENOUS EVERY 4 HOURS PRN
Status: DISCONTINUED | OUTPATIENT
Start: 2021-03-04 | End: 2021-03-04

## 2021-03-04 RX ORDER — LEVOTHYROXINE SODIUM 0.12 MG/1
125 TABLET ORAL NIGHTLY
Status: DISCONTINUED | OUTPATIENT
Start: 2021-03-04 | End: 2021-03-08 | Stop reason: HOSPADM

## 2021-03-04 RX ORDER — ONDANSETRON 2 MG/ML
4 INJECTION INTRAMUSCULAR; INTRAVENOUS EVERY 6 HOURS PRN
Status: DISCONTINUED | OUTPATIENT
Start: 2021-03-04 | End: 2021-03-08 | Stop reason: HOSPADM

## 2021-03-04 RX ORDER — ACETAMINOPHEN 325 MG/1
650 TABLET ORAL EVERY 6 HOURS PRN
Status: DISCONTINUED | OUTPATIENT
Start: 2021-03-04 | End: 2021-03-08 | Stop reason: HOSPADM

## 2021-03-04 RX ORDER — ESTRADIOL 1 MG/1
0.5 TABLET ORAL DAILY
Status: DISCONTINUED | OUTPATIENT
Start: 2021-03-04 | End: 2021-03-08 | Stop reason: HOSPADM

## 2021-03-04 RX ORDER — ATENOLOL 25 MG/1
25 TABLET ORAL DAILY
Status: DISCONTINUED | OUTPATIENT
Start: 2021-03-04 | End: 2021-03-08 | Stop reason: HOSPADM

## 2021-03-04 RX ORDER — ALBUTEROL SULFATE 2.5 MG/3ML
2.5 SOLUTION RESPIRATORY (INHALATION) EVERY 6 HOURS PRN
Status: DISCONTINUED | OUTPATIENT
Start: 2021-03-04 | End: 2021-03-08 | Stop reason: HOSPADM

## 2021-03-04 RX ORDER — TIZANIDINE 4 MG/1
4 TABLET ORAL DAILY PRN
Status: DISCONTINUED | OUTPATIENT
Start: 2021-03-04 | End: 2021-03-04

## 2021-03-04 RX ORDER — PROMETHAZINE HYDROCHLORIDE 25 MG/1
25 TABLET ORAL EVERY 8 HOURS PRN
Status: DISCONTINUED | OUTPATIENT
Start: 2021-03-04 | End: 2021-03-08 | Stop reason: HOSPADM

## 2021-03-04 RX ORDER — GABAPENTIN 400 MG/1
800 CAPSULE ORAL 3 TIMES DAILY
Status: DISCONTINUED | OUTPATIENT
Start: 2021-03-04 | End: 2021-03-04

## 2021-03-04 RX ADMIN — ENOXAPARIN SODIUM 40 MG: 40 INJECTION SUBCUTANEOUS at 08:13

## 2021-03-04 RX ADMIN — ONDANSETRON 4 MG: 2 INJECTION INTRAMUSCULAR; INTRAVENOUS at 03:52

## 2021-03-04 RX ADMIN — ESTRADIOL 0.5 MG: 1 TABLET ORAL at 08:14

## 2021-03-04 RX ADMIN — TIZANIDINE 4 MG: 4 TABLET ORAL at 02:54

## 2021-03-04 RX ADMIN — HYDROXYZINE PAMOATE 50 MG: 25 CAPSULE ORAL at 13:05

## 2021-03-04 RX ADMIN — LEVETIRACETAM 1000 MG: 500 TABLET, FILM COATED ORAL at 21:29

## 2021-03-04 RX ADMIN — ATENOLOL 25 MG: 25 TABLET ORAL at 08:13

## 2021-03-04 RX ADMIN — GABAPENTIN 800 MG: 400 CAPSULE ORAL at 13:05

## 2021-03-04 RX ADMIN — PAROXETINE 50 MG: 10 TABLET, FILM COATED ORAL at 21:29

## 2021-03-04 RX ADMIN — TIZANIDINE 4 MG: 4 TABLET ORAL at 13:05

## 2021-03-04 RX ADMIN — GABAPENTIN 800 MG: 400 CAPSULE ORAL at 21:19

## 2021-03-04 RX ADMIN — LEVOTHYROXINE SODIUM 125 MCG: 125 TABLET ORAL at 21:29

## 2021-03-04 RX ADMIN — LEVETIRACETAM 1000 MG: 500 TABLET, FILM COATED ORAL at 08:13

## 2021-03-04 RX ADMIN — LEVETIRACETAM 1000 MG: 500 TABLET, FILM COATED ORAL at 02:51

## 2021-03-04 RX ADMIN — SODIUM CHLORIDE, PRESERVATIVE FREE 10 ML: 5 INJECTION INTRAVENOUS at 21:19

## 2021-03-04 RX ADMIN — DEXTROSE MONOHYDRATE 600 MG: 50 INJECTION, SOLUTION INTRAVENOUS at 08:14

## 2021-03-04 RX ADMIN — MORPHINE SULFATE 2 MG: 2 INJECTION, SOLUTION INTRAMUSCULAR; INTRAVENOUS at 21:18

## 2021-03-04 RX ADMIN — FERROUS SULFATE TAB 325 MG (65 MG ELEMENTAL FE) 325 MG: 325 (65 FE) TAB at 08:13

## 2021-03-04 RX ADMIN — VANCOMYCIN HYDROCHLORIDE 1000 MG: 1 INJECTION, POWDER, LYOPHILIZED, FOR SOLUTION INTRAVENOUS at 22:30

## 2021-03-04 RX ADMIN — VANCOMYCIN HYDROCHLORIDE 1000 MG: 1 INJECTION, POWDER, LYOPHILIZED, FOR SOLUTION INTRAVENOUS at 08:28

## 2021-03-04 RX ADMIN — HYDROXYZINE PAMOATE 50 MG: 25 CAPSULE ORAL at 02:51

## 2021-03-04 RX ADMIN — MORPHINE SULFATE 2 MG: 2 INJECTION, SOLUTION INTRAMUSCULAR; INTRAVENOUS at 08:14

## 2021-03-04 RX ADMIN — HYDROXYZINE PAMOATE 50 MG: 25 CAPSULE ORAL at 21:19

## 2021-03-04 RX ADMIN — GABAPENTIN 800 MG: 400 CAPSULE ORAL at 08:13

## 2021-03-04 RX ADMIN — ONDANSETRON 4 MG: 2 INJECTION INTRAMUSCULAR; INTRAVENOUS at 12:58

## 2021-03-04 RX ADMIN — ONDANSETRON 4 MG: 2 INJECTION INTRAMUSCULAR; INTRAVENOUS at 21:18

## 2021-03-04 RX ADMIN — VANCOMYCIN HYDROCHLORIDE 1000 MG: 1 INJECTION, POWDER, LYOPHILIZED, FOR SOLUTION INTRAVENOUS at 15:31

## 2021-03-04 RX ADMIN — MORPHINE SULFATE 2 MG: 2 INJECTION, SOLUTION INTRAMUSCULAR; INTRAVENOUS at 12:58

## 2021-03-04 RX ADMIN — GABAPENTIN 800 MG: 400 CAPSULE ORAL at 02:51

## 2021-03-04 RX ADMIN — MORPHINE SULFATE 2 MG: 4 INJECTION, SOLUTION INTRAMUSCULAR; INTRAVENOUS at 02:47

## 2021-03-04 RX ADMIN — DEXTROSE MONOHYDRATE 600 MG: 50 INJECTION, SOLUTION INTRAVENOUS at 15:31

## 2021-03-04 RX ADMIN — ONDANSETRON 4 MG: 2 INJECTION INTRAMUSCULAR; INTRAVENOUS at 08:14

## 2021-03-04 RX ADMIN — SODIUM CHLORIDE, PRESERVATIVE FREE 10 ML: 5 INJECTION INTRAVENOUS at 08:28

## 2021-03-04 RX ADMIN — TIZANIDINE 4 MG: 4 TABLET ORAL at 23:33

## 2021-03-04 ASSESSMENT — PAIN DESCRIPTION - ORIENTATION
ORIENTATION: RIGHT
ORIENTATION: LEFT
ORIENTATION: LEFT

## 2021-03-04 ASSESSMENT — PAIN DESCRIPTION - DESCRIPTORS
DESCRIPTORS: THROBBING
DESCRIPTORS: DULL;SHARP;BURNING

## 2021-03-04 ASSESSMENT — PAIN SCALES - GENERAL
PAINLEVEL_OUTOF10: 7
PAINLEVEL_OUTOF10: 8
PAINLEVEL_OUTOF10: 7

## 2021-03-04 ASSESSMENT — PAIN DESCRIPTION - PAIN TYPE
TYPE: ACUTE PAIN;CHRONIC PAIN
TYPE: ACUTE PAIN
TYPE: ACUTE PAIN

## 2021-03-04 ASSESSMENT — PAIN DESCRIPTION - FREQUENCY
FREQUENCY: CONTINUOUS
FREQUENCY: CONTINUOUS

## 2021-03-04 ASSESSMENT — PAIN DESCRIPTION - LOCATION: LOCATION: LEG;ABDOMEN

## 2021-03-04 NOTE — H&P
HISTORY AND PHYSICAL  (Hospitalist, Internal Medicine)  IDENTIFYING INFORMATION   PATIENT:  Rosina Medrano  MRN:  [id number]  ADMIT DATE: 3/3/2021  TIME OF EVALUATION: 3/3/2021 10:56 PM    CHIEF COMPLAINT   Left thigh and abdominal pain    HISTORY OF PRESENT ILLNESS   Andre Pope is a 44 y.o. female admitted for left eye redness of the going on for several weeks, she went to her primary care physician, stated that she needs to come to the hospital to get evaluated for this. There is some extension, outside the margins of that was demarcated by the doctor. Patient does complain of pain palpation to the areas of the site. Denies any discharge. Patient does complain of fever 101 yesterday. She states that while she was at her primary care doctor, she had transient hypoxemia present. Any associated shortness of breath, cough, myalgias. Patient with complaints of chronic body aches. Patient denies any sick contacts. She does also complain of epigastric abdominal pain that radiates to the right upper quadrant. She states this pain is typical of what is been going on for weeks. She has been evaluated by her surgeon, states that she may need a Jet-en-Y procedure. However she states the pain is more persistent since just over the weekend. Describes the pain as aching, not associated with any nausea or vomiting. Patient states that she does have intermittent diarrhea but has none at this time. She denies any dysuria. She states she has not. She does state that she has a Mediport, and that was elevated when she was recommended to come to the hospital.  Patient also reports history of MRSA infection.     PMH listed below:    PAST MEDICAL, SURGICAL, FAMILY, and SOCIAL HISTORY     Past Medical History:   Diagnosis Date    Acid reflux     Anemia     Anxiety     Arthritis     Hands, Back And Ankles    Bleeding ulcer 2014    \"I Had Ulcers In My Stomach And Colon\"/ per pt on 8/12/2019\"they said recently having some blood in my stomach- in July ( 2019)could not find where coming from \"    Bronchitis Last Episode 2014    Chronic back pain     Chronic pain     Sees Dr. Yamel Berg COPD (chronic obstructive pulmonary disease) ([facility] [address]     Sees Dr. Matthew Robles Depression     Fibromyalgia Dx 2013    H/O echocardiogram 8/11/15    EF >55%. LA to be at the upper limit of normal in size. LV hypertrophy with normal LV systolic, but abnormal diastolic function. Normal valvular structures and function.  H/O echocardiogram 08/30/2018    EF 55-60%    Hiatal hernia     History of blood transfusion 09/2015 And 2018    No Reaction To Blood Transfusions Received    [place] (hard of hearing)     Right Ear    Hx of cardiac catheterization 10/24/2010    Angiographically normal coronary arteries w/ normal LV function and wall motion.  Hx of transesophageal echocardiography (PILO) for monitoring 12/2/2010    EF 55-60%. WNL.    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 name    Kidney cysts     \"they found that I have a kidney cyst but not sure which side\" per pt on 7/15/2020    Lupus ([facility] [address] Dx 2013    \"Rheumatoid Lupus\"    MDRO (multiple drug resistant organisms) resistance     4 months ago chest from mediport    Morbid obesity ([facility] [address]     MRSA bacteremia     Nausea     \"Most Of The Time\"    Pain management     Sees Dr. Matilde Quarles, Once A Month    Pancreatitis chronic Dx 2001    Pneumonia Dx 11-15    Shortness of breath on exertion     Shortness of breath on exertion     Sleep apnea     Has CPAP\"no longer use the cpap since lost weight\"    Staph infection Dx 11-15    Left Foot    Staph infection Dx 11-15    \"Left Foot\"    Teeth missing     Upper And Lower    Thyroid disease     Wears glasses     To Read     Past Surgical History:   Procedure Laterality Date    APPENDECTOMY  02/1998    Done When Tubes And Ovaries Were Removed    CARDIAC CATHETERIZATION  10/24/2010    CATHETER REMOVAL N/A 7/16/2019    PORT REMOVAL performed by Екатерина Jarvis MD at [address] [facility]  08/27/1991    CHOLECYSTECTOMY, LAPAROSCOPIC  1990's    COLONOSCOPY  Last Done In 2000's    DENTAL SURGERY      Teeth Extracted In Past    ENDOSCOPY, COLON, DIAGNOSTIC  Last Done In 2018    FOOT DEBRIDEMENT Left 6/16/2019    FIRST METATARSAL DEBRIDEMENT INCISION AND DRAINAGE. EXCISION OF ULCER.  RESECTION OF BONE. 1ST METATARSAL POWER LAVAGE AND BONE CEMENT performed by Orlando Mari DPM at [address] Left Last Done In 2016     Dr. Areli Robledo, \" About 6 Surgeries Done Because Of Staph Infection\"    HIATAL HERNIA REPAIR N/A 2/12/2019    HERNIA HIATAL LAPAROSCOPIC ROBOTIC performed by Екатерина Jarvis MD at [facility]  10/1997    Partial Abdominal Hysterectomy    INSERTION / REMOVAL / REPLACEMENT VENOUS ACCESS CATHETER N/A 8/15/2019    PORT INSERTION performed by Екатерина Jarvis MD at [address] [facility]    removed after 6 months    LUNG REMOVAL, PARTIAL Left 2008    Benign    OTHER SURGICAL HISTORY  02/1998    \"Tubes And Ovaries Removed, Appendectomy Also Done\"    OTHER SURGICAL HISTORY  Last Done 7-15-16    Mediport Insertion \"Total Of Six Done, Removed Last Mediport In 2014\"    PORT SURGERY N/A 1/15/2020    PORT REMOVAL performed by Екатерина Jarvis MD at [address] N/A 7/6/2020    PORT INSERTION performed by Екатерина Jarvis MD at [address] [facility] N/A 2/12/2019    GASTRECTOMY SLEEVE LAPAROSCOPIC ROBOTIC performed by Екатерина Jarvis MD at [address] [facility]  08/27/2018    UPPER GASTROINTESTINAL ENDOSCOPY N/A 4/2/2019    EGD CONTROL HEMORRHAGE with epi injection at bleeding site performed by Samson Lamas MD at [facility] N/A 6/19/2019    EGD DIAGNOSTIC ONLY performed by Samson Lamas MD at [facility] N/A 7/22/2019    EGD DIAGNOSTIC ONLY performed by Meseret Kruse MD at [facility] N/A 10/14/2019    EGD DIAGNOSTIC ONLY performed by Samson Lamas MD at [facility] N/A 7/17/2020    EGJ DILATATION BALLOON WITH 18-20 MM BALLOON, DILATED TO 20 MM. performed by Samson Lamas MD at [facility] ENDOSCOPY     Family History   Problem Relation Age of Onset    Diabetes Mother         \"Borderline Diabetes\"    High Blood Pressure Mother     Obesity Mother     Arthritis Mother     Heart Disease Mother     High Cholesterol Mother     Vision Loss Mother     Diabetes Father     High Blood Pressure Father     Asthma Father     Cancer Father         prostate cancer    High Blood Pressure Brother     Asthma Son     Vision Loss Son     Lupus Daughter     Other Daughter         \"Alot Of Female Problems\"     Family Hx of HTN  Family Hx as reviewed above, otherwise non-contributory  Social History     Socioeconomic History    Marital status:      Spouse name: None    Number of children: None    Years of education: None    Highest education level: None   Occupational History    None   Social Needs    Financial resource strain: None    Food insecurity     Worry: None     Inability: None    Transportation needs     Medical: None     Non-medical: None   Tobacco Use    Smoking status: Never Smoker    Smokeless tobacco: Never Used   Substance and Sexual Activity    Alcohol use: No     Alcohol/week: 0.0 standard drinks    Drug use: No    Sexual activity: Not Currently   Lifestyle    Physical activity     Days per week: None Minutes per session: None    Stress: None   Relationships    Social connections     Talks on phone: None     Gets together: None     Attends [employer] service: None     Active member of club or organization: None     Attends meetings of clubs or organizations: None     Relationship status: None    Intimate partner violence     Fear of current or ex partner: None     Emotionally abused: None     Physically abused: None     Forced sexual activity: None   Other Topics Concern    None   Social History Narrative    None       MEDICATIONS   Medications Prior to Admission  Not in a hospital admission. Current Medications  Current Facility-Administered Medications   Medication Dose Route Frequency Provider Last Rate Last Admin    ondansetron (ZOFRAN) injection 4 mg  4 mg Intravenous Q6H PRN Shabana Carroll MD        clindamycin (CLEOCIN) 900 mg in dextrose 5 % 50 mL IVPB  900 mg Intravenous Once Shabana Carroll  mL/hr at 03/03/21 2246 900 mg at 03/03/21 2246     Current Outpatient Medications   Medication Sig Dispense Refill    albuterol (PROVENTIL) (2.5 MG/3ML) 0.083% nebulizer solution Take 3 mLs by nebulization every 6 hours as needed for Wheezing 120 each 3    hydrOXYzine (VISTARIL) 50 MG capsule Take 1 capsule by mouth every 6 hours as needed for Anxiety 20 capsule 0    albuterol sulfate  (90 Base) MCG/ACT inhaler Inhale 2 puffs into the lungs 4 times daily 1 Inhaler 5    promethazine (PHENERGAN) 25 MG tablet Take 1 tablet by mouth every 8 hours as needed for Nausea 30 tablet 3    ferrous sulfate (IRON 325) 325 (65 Fe) MG tablet Take 1 tablet by mouth daily (with breakfast) 90 tablet 5    levETIRAcetam (KEPPRA) 1000 MG tablet Take 1 tablet by mouth 2 times daily 60 tablet 5    dicyclomine (BENTYL) 10 MG capsule Take 1 capsule by mouth 3 times daily as needed (abdominal pain) 21 capsule 3    oxyCODONE-acetaminophen (PERCOCET) 5-325 MG per tablet Take 1 tablet by mouth.  Every 4-6 hours PRN      Cholecalciferol (VITAMIN D3) 5000 units TABS Take 1 tablet by mouth daily      estradiol (ESTRACE) 0.5 MG tablet Take 0.5 mg by mouth daily      atenolol (TENORMIN) 25 MG tablet Take 25 mg by mouth daily      Multiple Vitamin (MVI, BARIATRIC ADVANTAGE MULTI-FORMULA, CHEW TAB) Take 1 tablet by mouth daily 30 tablet 5    Calcium Citrate-Vitamin D (CALCIUM + VIT D, BARIATRIC ADVANTAGE, CHEWABLE TABLET) Take 1 tablet by mouth 2 times daily 120 tablet 5    levothyroxine (SYNTHROID) 125 MCG tablet Take 1 tablet by mouth Daily (Patient taking differently: Take 125 mcg by mouth nightly ) 30 tablet 0    gabapentin (NEURONTIN) 800 MG tablet Take 800 mg by mouth 3 times daily      PARoxetine (PAXIL) 30 MG tablet Take 50 mg by mouth nightly            Allergies  Allergies   Allergen Reactions    Aspirin Palpitations     \"My Heart Rate Elevates\"    Shellfish-Derived Products Swelling    Toradol [Ketorolac Tromethamine] Rash    Compazine [Prochlorperazine]     Reglan [Metoclopramide] Itching       REVIEW OF SYSTEMS   Within above limitations. 14 point review of systems reviewed. Pertinent positive or negative as per HPI or otherwise negative per 14 point systems review. PHYSICAL EXAM     Wt Readings from Last 3 Encounters:   02/17/21 272 lb 12.8 oz (123.7 kg)   02/11/21 278 lb 1.6 oz (126.1 kg)   02/08/21 280 lb (127 kg)       Blood pressure (!) 149/98, pulse 79, temperature 99.1 °F (37.3 °C), temperature source Oral, resp. rate 18, SpO2 98 %, not currently breastfeeding. General - AAO x 3  Psych - Appropriate affect/speech. No agitation  Eyes - Eye lids intact. No scleral icterus  ENT - Lips wnl. External ear clear/dry/intact. No thyromegaly on inspection  Neuro - No gross peripheral or central neuro deficits on inspection  Heart - Sinus. RRR. S1 and S2 present. No added HS/murmurs appreciated. No elevated JVD appreciated  Lung - Adequate air entry b/l, No crackles/wheezes appreciated  GI - Soft.  No guarding/rigidity. No hepatosplenomegaly/ascites. BS+; mild epigastric and RUQ TTP, no rebound   - No CVA/suprapubic tenderness or palpable bladder distension  Skin - Intact. No rash/petechiae/ecchymosis. Warm extremities  MSK - Joints with normal ROM.  No joint swellings    Lines/Drains/Airways/Wounds:  [unfilled]    LABS AND IMAGING   CBC  [unfilled]    Last 3 Hemoglobin  Lab Results   Component Value Date    HGB 10.2 03/03/2021    HGB 10.3 02/13/2021    HGB 10.3 02/12/2021     Last 3 WBC/ANC  Lab Results   Component Value Date    WBC 4.5 03/03/2021    WBC 3.6 02/13/2021    WBC 3.8 02/12/2021     No components found for: GRNLOCTYABS  Last 3 Platelets  No results found for: PLATELET  Chemistry  [unfilled]  [unfilled]  Lab Results   Component Value Date     08/05/2018     03/05/2016     01/02/2016     Coagulation Studies  Lab Results   Component Value Date    INR 0.86 02/12/2021     Liver Function Studies  Lab Results   Component Value Date    ALT 14 03/03/2021    AST 17 03/03/2021    ALKPHOS 82 03/03/2021       Recent Imaging        Relevant labs and imaging reviewed    ASSESSMENT AND PLAN   Duane Peyer is a 44 y.o. female p/w    cellulitis of L thigh  - marked via body marker   - clinda given by ED, will continue  - Consider CT scan of left lower extremity if pain/swelling worsens  - follow-up blood cultures  - wound care consult  - wound Cx    LLE swelling, asymetric  - VL doppler stat    Transient hypoxia with fever 101F yesterday  - no dysuria  - has LLE cellulitis  - also c/o transient hypoxia in outpatient office, no SOB or cough, has chronic body aches  -Has Mediport  -Ordered blood cultures, although clindamycin was already given, will expand antibiotic coverage to vancomycin, consider ID consult  Chest x-ray  UA    N/V Acute on chronic abdominal pain, more persistent, in setting of chronic pancreatitis and gastric bypass  - s/p cholecystomy  -has had multiple CT abdomen

## 2021-03-04 NOTE — CARE COORDINATION
Pt identified as potential readmission. Last admission 2/9/ - 2/13 for Chest pain. Pt here today for Wound Check and Vomiting. Patient has chronic abdominal pain and being admitted for intractable nausea and vomiting. Pt is requiring readmission and there were no alternatives to admission to explore at this time.

## 2021-03-04 NOTE — CONSULTS
Infectious Disease Consult Note  3/4/2021   Patient Name: Leotis Koyanagi : 1970   Impression   Left leg ulcer with surrounding inflammation  o Atypical presentation for bacterial cellulitis. Evaluate for AFB and fungal etiology. Also evaluate patient's humoral immune system.  Abdominal pain: Being seen by GI   Multi-morbidity: per PMHx Mediport, obesity, lupus  Plan:   Therapeutic: Discontinue clindamycin, continue vancomycin   Diagnostic: Recommend biopsy of ulcer, sent for histopathology, bacterial, fungal and AFB cultures.  F/u test:     Thank you for allowing me to consult in the care of this patient.  ------------------------  REASON FOR CONSULT: Infective syndrome   Requested by: Dr. Jose Zuniga is a 44 y.o.   female withMedical history of morbid obesity, left MSSA metatarsal osteomyelitis, Pseudomonas aeruginosa catheter related bloodstream infection, lupus. She was admitted 3/3/2021 for further evaluation and management of abdominal pain. He also complained of a left leg ulcer that started as a blister 2 weeks ago. It eroded and became an ulcer. Remains painful. She also has painful subcutaneous nodules. clinical diagnosis of cellulitis was made. She was started on vancomycin and clindamycin. ? Infectious diseases service was consulted to evaluate the pt, and recommend further investigative and therapeutic measures. Review and summary of old records:  ROS: Other systems reviewed Including eyes, ENT, respiratory, cardiovascular, GI, , dermatologic, neurologic, psych, hem/lymphatic, musculoskeletal and endocrine were negative other than what is mentioned above.      Patient Active Problem List    Diagnosis Date Noted    Cellulitis of left thigh 2021    Chest pain of uncertain etiology     Discoloration of skin of flank resembling ecchymosis 01/15/2021    Morbid obesity with BMI of 50.0-59.9, adult ([facility] 01/15/2021    Status post bariatric surgery 10/23/2020    Morbid obesity with BMI of 45.0-49.9, adult ([facility] 10/23/2020    Intractable vomiting 10/17/2020    RUQ pain 09/30/2020    Anxiety 08/17/2020    Abdominal pain 07/21/2020    Seizure ([facility] 07/17/2020    Intractable nausea and vomiting 07/03/2020    Encounter for weight management 06/26/2020    Iron deficiency anemia secondary to blood loss (chronic) 04/07/2020    Morbid obesity with BMI of 40.0-44.9, adult ([facility] 03/27/2020    Lymph node disorder 12/04/2019    Chronic pain syndrome 04/01/2019    Drug-seeking/Aberrant behavior 04/01/2019    Pseudoseizure 03/31/2019    Status post laparoscopic sleeve gastrectomy 02/22/2019    Hiatal hernia     Intractable vomiting with nausea 07/21/2018    Frequent UTI 04/28/2016    Gastroesophageal reflux disease without esophagitis 04/28/2016    Depression 04/28/2016    Fatty liver disease, nonalcoholic 38/33/9844    Arthritis 04/28/2016    Bilateral low back pain with left-sided sciatica 04/28/2016    Generalized abdominal pain 09/26/2015    Chronic pancreatitis ([facility]     HTN (hypertension)     Fibromyalgia 04/19/2013    Lupus (systemic lupus erythematosus) ([facility] 04/19/2013     Past Medical History:   Diagnosis Date    Acid reflux     Anemia     Anxiety     Arthritis     Hands, Back And Ankles    Bleeding ulcer 2014    \"I Had Ulcers In My Stomach And Colon\"/ per pt on 8/12/2019\"they said recently having some blood in my stomach- in July ( 2019)could not find where coming from \"    Bronchitis Last Episode 2014    Chronic back pain     Chronic pain     Sees Dr. Aric Doherty COPD (chronic obstructive pulmonary disease) ([facility]     Sees Dr. Beasley Pulse Depression     Fibromyalgia Dx 2013    H/O echocardiogram 8/11/15    EF >55%. LA to be at the upper limit of normal in size. LV hypertrophy with normal LV systolic, but abnormal diastolic function. Normal valvular structures and function.      H/O echocardiogram 08/30/2018    EF 55-60%    Hiatal hernia     History of blood transfusion 09/2015 And 2018    No Reaction To Blood Transfusions Received    [place] (hard of hearing)     Right Ear    Hx of cardiac catheterization 10/24/2010    Angiographically normal coronary arteries w/ normal LV function and wall motion.  Hx of transesophageal echocardiography (PILO) for monitoring 12/2/2010    EF 55-60%. WNL.    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- consult with Dr Brook Aleman     \"very difficulty IV stick- had mediport infection in the past- then put picc line in and removed 8/7/2019 now going to put new mediport in\"( 8/15/2019)    Hypertension     follow with Dr ? name    Kidney cysts     \"they found that I have a kidney cyst but not sure which side\" per pt on 7/15/2020    Lupus ([address] Dx 2013    \"Rheumatoid Lupus\"    MDRO (multiple drug resistant organisms) resistance     4 months ago chest from mediport    Morbid obesity ([address]     MRSA bacteremia     Nausea     \"Most Of The Time\"    Pain management     Sees Dr. Leeroy Huertas, Once A Month    Pancreatitis chronic Dx 2001    Pneumonia Dx 11-15    Shortness of breath on exertion     Shortness of breath on exertion     Sleep apnea     Has CPAP\"no longer use the cpap since lost weight\"    Staph infection Dx 11-15    Left Foot    Staph infection Dx 11-15    \"Left Foot\"    Teeth missing     Upper And Lower    Thyroid disease     Wears glasses     To Read      Past Surgical History:   Procedure Laterality Date    APPENDECTOMY  02/1998    Done When Tubes And Ovaries Were Removed    CARDIAC CATHETERIZATION  10/24/2010    CATHETER REMOVAL N/A 7/16/2019    PORT REMOVAL performed by Thais Do MD at [facility]  08/27/1991    CHOLECYSTECTOMY, LAPAROSCOPIC  1990's    COLONOSCOPY  Last Done In 2000's    DENTAL SURGERY      Teeth Extracted In Past    ENDOSCOPY, COLON, DIAGNOSTIC  Last Done In 2018    FOOT DEBRIDEMENT Left 6/16/2019    FIRST METATARSAL DEBRIDEMENT INCISION AND DRAINAGE. EXCISION OF ULCER.  RESECTION OF BONE. 1ST METATARSAL POWER LAVAGE AND BONE CEMENT performed by Lu Gregg DPM at [address] Left Last Done In 2016     Dr. Eleni Pickens, \" About 6 Surgeries Done Because Of Staph Infection\"    HIATAL HERNIA REPAIR N/A 2/12/2019    HERNIA HIATAL LAPAROSCOPIC ROBOTIC performed by Norma Kearns MD at [facility]  10/1997    Partial Abdominal Hysterectomy    INSERTION / REMOVAL / REPLACEMENT VENOUS ACCESS CATHETER N/A 8/15/2019    PORT INSERTION performed by Norma Kearns MD at [address] [place]    removed after 6 months    LUNG REMOVAL, PARTIAL Left 2008    Benign    OTHER SURGICAL HISTORY  02/1998    \"Tubes And Ovaries Removed, Appendectomy Also Done\"    OTHER SURGICAL HISTORY  Last Done 7-15-16    Mediport Insertion \"Total Of Six Done, Removed Last Mediport In 2014\"    PORT SURGERY N/A 1/15/2020    PORT REMOVAL performed by Norma Kearns MD at [address] N/A 7/6/2020    PORT INSERTION performed by Norma Kearns MD at [facility] N/A 2/12/2019    GASTRECTOMY SLEEVE LAPAROSCOPIC ROBOTIC performed by Norma Kearns MD at [address] [facility]  08/27/2018    UPPER GASTROINTESTINAL ENDOSCOPY N/A 4/2/2019    EGD CONTROL HEMORRHAGE with epi injection at bleeding site performed by Norma Kearns MD at [place] [address] 6/19/2019    EGD DIAGNOSTIC ONLY performed by Norma Kearns MD at [facility] N/A 7/22/2019    EGD DIAGNOSTIC ONLY performed by Raymond Rubinstein, MD at [facility] N/A 10/14/2019    EGD DIAGNOSTIC ONLY performed by Norma Kearns MD at [address] [place] [address] GASTROINTESTINAL ENDOSCOPY N/A 7/17/2020    EGJ DILATATION BALLOON WITH 18-20 MM BALLOON, DILATED TO 20 MM. performed by Ranulfo Reyna MD at [facility] ENDOSCOPY      Family History   Problem Relation Age of Onset    Diabetes Mother         \"Borderline Diabetes\"    High Blood Pressure Mother     Obesity Mother     Arthritis Mother     Heart Disease Mother     High Cholesterol Mother     Vision Loss Mother     Diabetes Father     High Blood Pressure Father     Asthma Father     Cancer Father         prostate cancer    High Blood Pressure Brother     Asthma Son     Vision Loss Son     Lupus Daughter     Other Daughter         \"Alot Of Female Problems\"      Infectious disease related family history - not contibutory. SOCIAL HISTORY  Social History     Tobacco Use    Smoking status: Never Smoker    Smokeless tobacco: Never Used   Substance Use Topics    Alcohol use: No     Alcohol/week: 0.0 standard drinks       Born:   Lived   Occupation:   No recent travel of significance.  No recent unusual exposures.  NO pets    ? ALLERGIES  Allergies   Allergen Reactions    Aspirin Palpitations     \"My Heart Rate Elevates\"    Shellfish-Derived Products Swelling    Toradol [Ketorolac Tromethamine] Rash    Compazine [Prochlorperazine]     Reglan [Metoclopramide] Itching      MEDICATIONS  Reviewed and are per the chart/EMR. IMMUNIZATION HISTORY  Immunization History   Administered Date(s) Administered    Influenza Vaccine, unspecified formulation 10/01/2008, 11/02/2015    Influenza Virus Vaccine 12/01/2018    Influenza, Quadv, IM, PF (6 mo and older Fluzone, Flulaval, Fluarix, and 3 yrs and older Afluria) 09/30/2019    Pneumococcal Conjugate 13-valent (Jennifer Menjivar) 01/01/2006     ?   Antibiotics:   Clindamycin and vancomycin  ?  -------------------------------------------------------------------------------------------------------------------    Vital Signs:  Vitals:    03/04/21 0800   BP: 129/86 Pulse: 62   Resp: 21   Temp: 98.1 °F (36.7 °C)   SpO2: 96%         Exam:    VS: noted; wt 128.6 kg  Gen: alert and oriented X3, no distress  Skin: no stigmata of endocarditis  Wounds: Left leg posterior medial ulcer 1.5-2 cm diameter, dry slough at the base, slough. Sloping edges, thin rim of redness. HEMT: AT/NC Oropharynx pink, moist, and without lesions or exudates; dentition in good state of repair  Eyes: PERRLA, EOMI, conjunctiva pink, sclera anicteric. Neck: Supple. Trachea midline. No LAD. Chest: no distress and CTA. Good air movement. Heart: RRR and no MRG. Abd: soft, non-distended, no tenderness, no hepatomegaly. Normoactive bowel sounds. Ext: no clubbing, cyanosis, or edema  Catheter Site: without erythema or tenderness  LDA: CVC:  Neuro: Mental status intact. CN 2-12 intact and no focal sensory or motor deficits    ? Diagnostic Studies: reviewed  ? ? I have examined this patient and available medical records on this date and have made the above observations, conclusions and recommendations.   Electronically signed by: Electronically signed by Pako Suarez MD on 3/4/2021 at 6:37 PM

## 2021-03-04 NOTE — CONSULTS
changes noted from previous sleeve gastrectomy and  gastritis with old blood noted. There was no evidence of active peptic  ulcer disease. The patient also is being followed up on a regular basis  by Dr. Isabela Chaidez, who performed at least four EGDs, the first EGD was on  08/27/2018 and showed postsurgical changes from sleeve gastrectomy along  with mild gastritis, second EGD on 03/12/2019 for upper GI bleeding was  unremarkable, third EGD on 04/02/2019 showed bleeding from the distal  staple line, which was injected with epinephrine solution, and fourth  EGD on 06/19/2019 and was again unremarkable. The patient did have a  colonoscopy done in the past as well, and the patient also is being  followed up by Dr. David Pak for pain management. The patient also has  been at [facility] in 2020 for a couple of days for chronic  right upper quadrant abdominal pain and possible ERCP, but no ERCP was  performed and the patient was discharged to be followed up locally. The  patient did have an MRCP done on 09/25/2020 and was unremarkable. The  patient is hemodynamically stable. REVIEW OF SYSTEMS:  CENTRAL NERVOUS SYSTEM:  The patient denies headache or focal  sensorimotor symptoms. CARDIOVASCULAR SYSTEM:  No history of chest pain, shortness of breath,  or leg swelling. GENITOURINARY SYSTEM:  No history of dysuria, pyuria, or hematuria. MUSCULOSKELETAL SYSTEM:  The patient complains of generalized weakness. RESPIRATORY SYSTEM:  No history of cough, hemoptysis, fever, or chills.     PAST MEDICAL HISTORY:  Significant for history of morbid obesity, status  post sleeve gastrectomy done by Dr. Isabela Chaidez, history of lupus,  hypertension, depression/anxiety, gastroesophageal reflux disease,  peptic ulcer disease, hypothyroidism, history of gallstones, status post  cholecystectomy, complicated by bile duct injury with at least seven or  eight ERCPs done in [place], [place] with extensive workup done for  sphincter of Oddi dysfunction, for which the patient had  biliary/pancreatic stents placed, which were subsequently removed, and  history of acute/recurrent/chronic pancreatitis, anemia, chronic back  pain, fibromyalgia, history of recurrent episodes of upper GI bleeding  requiring multiple EGDs, seizure disorder, and chronic pain syndrome. FAMILY HISTORY:  The patient's father was diagnosed with carcinoma of  the prostate, maternal aunt with carcinoma of the breast, maternal  grandmother with carcinoma of the stomach, and maternal grandfather with  carcinoma of the lung. MEDICATIONS:  Please refer to the chart. SOCIOECONOMIC HISTORY:  No history of EtOH use. The patient does not  smoke cigarettes. PAST SURGICAL HISTORY:  The patient has had total abdominal  hysterectomy, cholecystectomy, MediPort placed, which got infected and  was removed by Dr. Trev Torres, appendectomy, tonsillectomy, partial removal  of the left lung for benign disease, dental surgery, , multiple  EGDs, and at least one colonoscopy by Dr. Lainey Toro approximately seven years  ago, and seven or eight ERCPs done in [place], [place] for common  bile duct stone/sphincter of Oddi dysfunction and placement of  biliary/pancreatic stents, which were subsequently removed. The patient  also has had two EGDs done by Dr. Ghazala Crabtree, three EGDs by me, and four  EGDs by Dr. Trev Torres.  The patient also has had surgery done on her left  foot for osteomyelitis. ALLERGIES:  The patient is allergic to ASPIRIN, SHELLFISH, TORADOL,  COMPAZINE, and REGLAN. PHYSICAL EXAMINATION:  GENERAL:  Shows a 57-year-old white female who is obese, lying flat in  bed, in no acute distress. She is awake, alert, and oriented and  pleasant to talk with. VITAL SIGNS:  Stable. HEENT:  Shows skull to be atraumatic. NECK:  Supple. CHEST:  Shows diminished breath sounds. HEART:  S1 and S2 are normal.  ABDOMEN:  Soft, obese with mild tenderness in the upper abdomen. No  guarding or rigidity. Liver and spleen are not palpable. Bowel sounds  are present. RECTAL:  Deferred. CNS:  Shows the patient to be awake, alert, and oriented. There are no  focal sensorimotor signs. MUSCULOSKELETAL SYSTEM:  Shows 2 to 3 cm ulcerated wound/rash in the  left upper thigh. LABORATORY DATA:  The labs drawn during the present hospitalization  comprised a chem profile and LFTs, which are within normal limits. CBC  shows a WBC count of 4.5, hemoglobin 10.2, platelet count of 377,832. CAT scan of the abdomen and pelvis is in order for today. IMPRESSION:  3  A 68-year-old white female with multiple comorbidities, admitted  with nausea, vomiting, chronic right upper quadrant abdominal pain,  etiology to be determined. 2.  Left upper thigh rash/ulcer. RECOMMENDATIONS:  1. Agree with present management. 2.  We will follow up on the CAT scan report. 3.  Start the patient on clear liquid diet, advance as tolerated. 4.  General surgery and ID consult in order. 5.  If the patient continues to be symptomatic with abdominal pain, we  will discuss the case with the surgical consultant, Dr. Michael Kirk, for  possible diagnostic laparoscopy in view of the patient's intractable  abdominal pain requiring multiple hospitalizations and CAT scans. 6.  The case and plan have been discussed in detail with the patient.         Yamilka Leon MD    D: 03/04/2021 13:55:49       T: 03/04/2021 15:59:42     [email or web address]  Job#: [id number]     Doc#: [id number]    CC:

## 2021-03-04 NOTE — ED PROVIDER NOTES
I independently examined and evaluated Andre Pope. In brief their history revealed patient is here with 2 different complaints. Patient reports that she is concerned regarding a developing wound with redness to her left thigh that her pain management doctor sent her in for evaluation of. Patient has had some shaking chills. Patient has not been on antibiotics for this. Furthermore, patient is complaining of some slightly increased redness to the surgical site of her left foot where she had osteomyelitis remotely. Additionally, patient is reporting acute on chronic abdominal pain that is upper belly with persistent nausea and vomiting. Patient reports that she has not been able to \"keep anything down\". Patient has had a gastric sleeve and follows with Dr. Hermes Webster.      Their focused exam revealed the patient is afebrile, hypertensive but otherwise hemodynamically stable on room air. The patient appears age appropriate, appears well-hydrated, well-nourished. Appears overall nontoxic. Occasional shaking chills. Mucous membranes are moist. Speech is clear. Breathing is unlabored. Speaking clearly in full sentences. Abdomen is with elevated BMI. There is mild diffuse tenderness palpation that is worse in the epigastrium. Exam is limited by habitus. Port to left upper chest that is accessed. Skin is dry. There is some erythema with central eschar to the left thigh that has been outlined by pain management doctor. There is some extension of the erythema outside of the outline. There is no crepitance this area. No underlying fluctuance or active drainage. No blistering. Mental status is normal. The patient moves all extremities and is without facial droop.     Results for orders placed or performed during the hospital encounter of 03/03/21   CBC Auto Differential   Result Value Ref Range    WBC 4.5 4.0 - 10.5 K/CU MM    RBC 3.95 (L) 4.2 - 5.4 M/CU MM    Hemoglobin 10.2 (L) 12.5 - 16.0 GM/DL Hematocrit 32.3 (L) 37 - 47 %    MCV 81.8 78 - 100 FL    MCH 25.8 (L) 27 - 31 PG    MCHC 31.6 (L) 32.0 - 36.0 %    RDW 14.3 11.7 - 14.9 %    Platelets 992 320 - 240 K/CU MM    MPV 10.1 7.5 - 11.1 FL    Differential Type AUTOMATED DIFFERENTIAL     Segs Relative 53.7 36 - 66 %    Lymphocytes % 36.5 24 - 44 %    Monocytes % 5.8 (H) 0 - 4 %    Eosinophils % 3.1 (H) 0 - 3 %    Basophils % 0.7 0 - 1 %    Segs Absolute 2.4 K/CU MM    Lymphocytes Absolute 1.6 K/CU MM    Monocytes Absolute 0.3 K/CU MM    Eosinophils Absolute 0.1 K/CU MM    Basophils Absolute 0.0 K/CU MM    Nucleated RBC % 0.0 %    Total Nucleated RBC 0.0 K/CU MM    Total Immature Neutrophil 0.01 K/CU MM    Immature Neutrophil % 0.2 0 - 0.43 %   Comprehensive Metabolic Panel   Result Value Ref Range    Sodium 139 135 - 145 MMOL/L    Potassium 4.2 3.5 - 5.1 MMOL/L    Chloride 107 99 - 110 mMol/L    CO2 26 21 - 32 MMOL/L    BUN 15 6 - 23 MG/DL    CREATININE 0.8 0.6 - 1.1 MG/DL    Glucose 84 70 - 99 MG/DL    Calcium 9.5 8.3 - 10.6 MG/DL    Albumin 3.7 3.4 - 5.0 GM/DL    Total Protein 6.5 6.4 - 8.2 GM/DL    Total Bilirubin 0.2 0.0 - 1.0 MG/DL    ALT 14 10 - 40 U/L    AST 17 15 - 37 IU/L    Alkaline Phosphatase 82 40 - 129 IU/L    GFR Non-African American >60 >60 mL/min/1.73m2    GFR African American >60 >60 mL/min/1.73m2    Anion Gap 6 4 - 16   Lipase   Result Value Ref Range    Lipase 18 13 - 60 IU/L       12 lead EKG per my interpretation:  Normal Sinus Rhythm at 70  [place] is   Left axis deviation  QTc is  within an acceptable range  There is no specific T wave changes appreciated. There is no specific ST wave changes appreciated. No STEMI    Prior EKG to compare with was available and sinus bradycardia seen on prior. Xr Foot Left (min 3 Views)    Result Date: 3/3/2021  EXAMINATION: THREE XRAY VIEWS OF THE LEFT FOOT 3/3/2021 6:51 pm COMPARISON: None.  HISTORY: ORDERING SYSTEM PROVIDED HISTORY: left great toe redness TECHNOLOGIST PROVIDED HISTORY: Reason

## 2021-03-04 NOTE — PROGRESS NOTES
[address] [facility]  consulted by Dr. Re Diaz for monitoring and adjustment. Indication for treatment: LLE Cellulitis  Goal trough: 10 - 15 mcg/mL    Pertinent Laboratory Values:   Temp Readings from Last 3 Encounters:   03/04/21 98.2 °F (36.8 °C) (Oral)   02/17/21 96.7 °F (35.9 °C)   02/13/21 98.2 °F (36.8 °C) (Oral)     Recent Labs     03/03/21 2012   WBC 4.5     Recent Labs     03/03/21 2012   BUN 15   CREATININE 0.8     Estimated Creatinine Clearance: 109 mL/min (based on SCr of 0.8 mg/dL). No intake or output data in the 24 hours ending 03/04/21 0335    Pertinent Cultures:  Date    Source    Results  03/03   COVID-19   Not detected  03/04   Blood    Pending    Assessment:  WBC WNL at 4.5; Currently afebrile  SCr = 0.8 (WNL), BUN = 15 (WNL), No I/O data  Day(s) of therapy: 1  Vancomycin concentration:   03/05 - To be drawn    Plan:  Vancomycin 1,000 mg IV Q 8 Hours  Check Vancomycin trough prior to fourth dose; Monitor for accumulation  Pharmacy will continue to monitor patient and adjust therapy as indicated    Apoorva 3 03/05/21 @ 4:30 AM    Thank you for the consult.   Julianna Mccullough 91Lakia Sánchez   3/4/2021 3:35 AM

## 2021-03-04 NOTE — PROGRESS NOTES
without significant tenderness, masses, or guarding. Bowel sounds are normoactive.  No costovertebral angle tenderness. Normal appearing external genitalia. HEME/LYMPH No palpable cervical lymphadenopathy and no hepatosplenomegaly. No petechiae or ecchymoses. MSK Spontaneous movement of all extremities. No gross joint deformities. SKIN Normal coloration, warm, dry. NEURO Cranial nerves appear grossly intact, normal speech, no lateralizing weakness. PSYCH Awake, alert, oriented x 4. Affect appropriate. INTAKE: No intake/output data recorded. OUTPUT: No intake/output data recorded. LABS  Recent Labs     03/03/21 2012   WBC 4.5   HGB 10.2*   HCT 32.3*         Recent Labs     03/03/21 2012      K 4.2      CO2 26   BUN 15   CREATININE 0.8     Recent Labs     03/03/21 2012   AST 17   ALT 14   BILITOT 0.2   ALKPHOS 82     No results for input(s): INR in the last 72 hours. No results for input(s): CKTOTAL, CKMB, CKMBINDEX, TROPONINT in the last 72 hours.        Abnormal labs for today noted      Imaging:     ECHO:    Microbiology:  Blood culture:    Urine culture:    Sputum culture:    Procedures done this admission:    MEDS  Scheduled Meds:   clindamycin (CLEOCIN) IV  600 mg Intravenous Q6H    atenolol  25 mg Oral Daily    estradiol  0.5 mg Oral Daily    ferrous sulfate  325 mg Oral Daily with breakfast    levETIRAcetam  1,000 mg Oral BID    levothyroxine  125 mcg Oral Nightly    PARoxetine  50 mg Oral Nightly    sodium chloride flush  10 mL Intravenous 2 times per day    enoxaparin  40 mg Subcutaneous Daily    gabapentin  800 mg Oral TID    vancomycin  1,000 mg Intravenous Q8H     Continuous Infusions:  PRN Meds:albuterol, dicyclomine, hydrOXYzine, oxyCODONE-acetaminophen, promethazine, sodium chloride flush, magnesium hydroxide, acetaminophen **OR** acetaminophen, ondansetron **OR** ondansetron, tiZANidine, morphine        ASSESSMENT and [facility]: 2    1-Left thigh cellulitic spot with LLE edema- DVT study negative, does report hx of MRSA- noted surgery consulted ID  2-Acute on chronic abdominal pain associated with nausea- hx of gastric bypass/GERD/chronic pancreatitis- check CT, lipase and LFTs okay on admission- advance diet as tolerated.       Other issues  -seizure  -hx of lung nodules  -Chronic pancreatitis, not in exacerbation  -Morbid obesity with BMI of 47  -Hx of gastric bypass  -depression  -chronic pain syndrome follows with pain management          Disp:     Diet Diet NPO Effective Now Exceptions are: Ice Chips, Sips of Water with Meds   DVT Prophylaxis [] Lovenox, []  Heparin, [] SCDs, [] Ambulation   GI Prophylaxis [] PPI,  [] H2 Blocker,  [] Carafate,  [] Diet/Tube Feeds   Code Status Full Code   Disposition Patient requires continued admission due to abdominal pain/cellulitis   CMS Level of Risk [] Low, [x] Moderate,[]  High  Patient's risk as above due to abdominal pain/cellulitis     RONNELL MONTILLA MD 3/4/2021 9:19 AM

## 2021-03-04 NOTE — CONSULTS
SURGICAL CONSULTATION    Date of Admission:  3/3/2021  Date of Consultation:  3/4/2021    PCP:  Carlitos Rodriguez MD  Thank you  very much for your consultation, it's always appreciated. I had the privilege today to see your patient Zoila Fabian. Chief Complaint / History of Present Illness:  Zoila Fabian is a very pleasant 44 y.o. female who presents with pain in the Left upper medial thigh and is acute, worsening and dull compared to patient's normal condition. It is moderate in intensity for a duration of 2 weeks and is intermittent with modifying factors increased by or worse with pressure. Skin infection, NOT abscess, cellulitis WILL respond to topical Ab, and continue IV Ab till seen by ID.    PMH:   has a past medical history of Acid reflux, Anemia, Anxiety, Arthritis, Bleeding ulcer (), Bronchitis (Last Episode ), Chronic back pain, Chronic pain, COPD (chronic obstructive pulmonary disease) ([address] Depression, Fibromyalgia (Dx ), H/O echocardiogram (8/11/15), H/O echocardiogram (2018), Hiatal hernia, History of blood transfusion (2015 And ), [place] (hard of hearing), cardiac catheterization (10/24/2010), transesophageal echocardiography (PILO) for monitoring (2010), OTHER MEDICAL, OTHER MEDICAL, Hypertension, Kidney cysts, Lupus ([address] (Dx ), MDRO (multiple drug resistant organisms) resistance, Morbid obesity ([address] MRSA bacteremia, Nausea, Pain management, Pancreatitis chronic (Dx ), Pneumonia (Dx 11-15), Shortness of breath on exertion, Shortness of breath on exertion, Sleep apnea, Staph infection (Dx 11-15), Staph infection (Dx 11-15), Teeth missing, Thyroid disease, and Wears glasses. PSH:   has a past surgical history that includes Lung removal, partial (Left, );  section (1991); Foot surgery (Left, Last Done In ); Dental surgery;  Cholecystectomy, laparoscopic (1848'G); other surgical history (1998); other surgical history (Last Done 7-15-16); Tonsillectomy and adenoidectomy (1975); Appendectomy (02/1998); Upper gastrointestinal endoscopy (08/27/2018); Lap Band (~2000); Hysterectomy (10/1997); Endoscopy, colon, diagnostic (Last Done In 2018); Colonoscopy (Last Done In 2000's); Cardiac catheterization (10/24/2010); Sleeve Gastrectomy (N/A, 2/12/2019); hiatal hernia repair (N/A, 2/12/2019); Upper gastrointestinal endoscopy (N/A, 4/2/2019); Foot Debridement (Left, 6/16/2019); Upper gastrointestinal endoscopy (N/A, 6/19/2019); Catheter Removal (N/A, 7/16/2019); Upper gastrointestinal endoscopy (N/A, 7/22/2019); INSERTION / REMOVAL / REPLACEMENT VENOUS ACCESS CATHETER (N/A, 8/15/2019); Upper gastrointestinal endoscopy (N/A, 10/14/2019); [address] Surgery (N/A, 1/15/2020); Port Surgery (N/A, 7/6/2020); and Upper gastrointestinal endoscopy (N/A, 7/17/2020). Allergies: Allergies   Allergen Reactions    Aspirin Palpitations     \"My Heart Rate Elevates\"    Shellfish-Derived Products Swelling    Toradol [Ketorolac Tromethamine] Rash    Compazine [Prochlorperazine]     Reglan [Metoclopramide] Itching        Home Meds:    Prior to Admission medications    Medication Sig Start Date End Date Taking? Authorizing Provider   hydrOXYzine (VISTARIL) 50 MG capsule Take 1 capsule by mouth every 6 hours as needed for Anxiety 2/13/21  Yes Jake Stanley MD   promethazine (PHENERGAN) 25 MG tablet Take 1 tablet by mouth every 8 hours as needed for Nausea 1/25/21  Yes Nuha Simpson MD   ferrous sulfate (IRON 325) 325 (65 Fe) MG tablet Take 1 tablet by mouth daily (with breakfast) 10/23/20  Yes Nuha Simpson MD   levETIRAcetam (KEPPRA) 1000 MG tablet Take 1 tablet by mouth 2 times daily 7/19/20  Yes Jolan Duane, MD   dicyclomine (BENTYL) 10 MG capsule Take 1 capsule by mouth 3 times daily as needed (abdominal pain) 4/13/20  Yes Tammie Hale PA-C   oxyCODONE-acetaminophen (PERCOCET) 5-325 MG per tablet Take 1 tablet by mouth. Every 4-6 hours PRN   Yes Historical Provider, MD   Cholecalciferol (VITAMIN D3) 5000 units TABS Take 1 tablet by mouth daily   Yes Historical Provider, MD   estradiol (ESTRACE) 0.5 MG tablet Take 0.5 mg by mouth daily   Yes Historical Provider, MD   atenolol (TENORMIN) 25 MG tablet Take 25 mg by mouth daily   Yes Historical Provider, MD   Multiple Vitamin (MVI, BARIATRIC ADVANTAGE MULTI-FORMULA, CHEW TAB) Take 1 tablet by mouth daily 2/22/19  Yes Norma Kearns MD   Calcium Citrate-Vitamin D (CALCIUM + VIT D, BARIATRIC ADVANTAGE, CHEWABLE TABLET) Take 1 tablet by mouth 2 times daily 2/22/19  Yes Norma Kearns MD   levothyroxine (SYNTHROID) 125 MCG tablet Take 1 tablet by mouth Daily  Patient taking differently: Take 125 mcg by mouth nightly  8/8/18  Yes Pastor Rogelio MD   gabapentin (NEURONTIN) 800 MG tablet Take 800 mg by mouth 3 times daily   Yes Historical Provider, MD   PARoxetine (PAXIL) 30 MG tablet Take 50 mg by mouth nightly    Yes Historical Provider, MD   albuterol (PROVENTIL) (2.5 MG/3ML) 0.083% nebulizer solution Take 3 mLs by nebulization every 6 hours as needed for Wheezing 2/18/21   Tristen Pablo MD   albuterol sulfate  (90 Base) MCG/ACT inhaler Inhale 2 puffs into the lungs 4 times daily 2/8/21   Tristen Pablo MD        [facility] Meds:    Current Facility-Administered Medications   Medication Dose Route Frequency Provider Last Rate Last Admin    clindamycin (CLEOCIN) 600 mg in dextrose 5 % 50 mL IVPB  600 mg Intravenous Q6H Ricardopaulino Moreno MD        albuterol (PROVENTIL) nebulizer solution 2.5 mg  2.5 mg Nebulization Q6H PRN Ricardopaulino Moreno MD        atenolol (TENORMIN) tablet 25 mg  25 mg Oral Daily Ricardopaulino Moreno MD        dicyclomine (BENTYL) capsule 10 mg  10 mg Oral TID PRN Ricardopaulino Moreno MD        estradiol (ESTRACE) tablet 0.5 mg  0.5 mg Oral Daily Ricardopaulino Moreno MD        ferrous sulfate (IRON 325) tablet 325 mg  325 mg Oral Daily with breakfast Buck Giang MD        hydrOXYzine (VISTARIL) capsule 50 mg  50 mg Oral Q6H PRN Ricardo Dillan Morrissey MD   50 mg at 03/04/21 0251    levETIRAcetam (KEPPRA) tablet 1,000 mg  1,000 mg Oral BID Ortiz SOOD MD   1,000 mg at 03/04/21 0251    levothyroxine (SYNTHROID) tablet 125 mcg  125 mcg Oral Nightly Ricardo Dillan Morrissey MD        oxyCODONE-acetaminophen (PERCOCET) 5-325 MG per tablet 1 tablet  1 tablet Oral Q8H PRN Ricardo Dillan Morrissey MD        PARoxetine (PAXIL) tablet 50 mg  50 mg Oral Nightly Ricardo Dillan Morrissey MD        promethazine (PHENERGAN) tablet 25 mg  25 mg Oral Q8H PRN Ricardo Dillan Morrissey MD        sodium chloride flush 0.9 % injection 10 mL  10 mL Intravenous 2 times per day Buck Giang MD        sodium chloride flush 0.9 % injection 10 mL  10 mL Intravenous PRN Ricardo Dillan Morrissey MD        enoxaparin (LOVENOX) injection 40 mg  40 mg Subcutaneous Daily Ricardo Dillan Morrissey MD        magnesium hydroxide (MILK OF MAGNESIA) 400 MG/5ML suspension 30 mL  30 mL Oral Daily PRN Ricardo Morrissey MD        acetaminophen (TYLENOL) tablet 650 mg  650 mg Oral Q6H PRN Ricardopaulino Morrissey MD        Or    acetaminophen (TYLENOL) suppository 650 mg  650 mg Rectal Q6H PRN Ricardopaulino Morrissey MD        ondansetron (ZOFRAN-ODT) disintegrating tablet 4 mg  4 mg Oral Q8H PRN Ricardo Morrissey MD        Or    ondansetron (ZOFRAN) injection 4 mg  4 mg Intravenous Q6H PRN Ricardopaulino Morrissey MD   4 mg at 03/04/21 0352    gabapentin (NEURONTIN) capsule 800 mg  800 mg Oral TID Yandy Henriquez PA-C   800 mg at 03/04/21 0251    tiZANidine (ZANAFLEX) tablet 4 mg  4 mg Oral Daily PRN Yandy Henriquez PA-C   4 mg at 03/04/21 0254    vancomycin 1000 mg IVPB in 250 mL D5W addavial  1,000 mg Intravenous Q8H Ricardo Dillan Morrissey MD        morphine (PF) injection 2 mg  2 mg Intravenous Q4H PRN Ortiz SODO MD           Social History / Family History:     Social History     Socioeconomic History    Marital status:      Spouse name: None    Number of children: None    Years of education: None    Highest education level: None   Occupational History    None   Social Needs    Financial resource strain: None    Food insecurity     Worry: None     Inability: None    Transportation needs     Medical: None     Non-medical: None   Tobacco Use    Smoking status: Never Smoker    Smokeless tobacco: Never Used   Substance and Sexual Activity    Alcohol use: No     Alcohol/week: 0.0 standard drinks    Drug use: No    Sexual activity: Not Currently   Lifestyle    Physical activity     Days per week: None     Minutes per session: None    Stress: None   Relationships    Social connections     Talks on phone: None     Gets together: None     Attends Tenriism service: None     Active member of club or organization: None     Attends meetings of clubs or organizations: None     Relationship status: None    Intimate partner violence     Fear of current or ex partner: None     Emotionally abused: None     Physically abused: None     Forced sexual activity: None   Other Topics Concern    None   Social History Narrative    None      Family History   Problem Relation Age of Onset    Diabetes Mother         \"Borderline Diabetes\"    High Blood Pressure Mother     Obesity Mother     Arthritis Mother     Heart Disease Mother     High Cholesterol Mother     Vision Loss Mother     Diabetes Father     High Blood Pressure Father     Asthma Father     Cancer Father         prostate cancer    High Blood Pressure Brother     Asthma Son     Vision Loss Son     Lupus Daughter     Other Daughter         \"Alot Of Female Problems\"    otherwise irrelevant to this surgical issue. Review of Systems:  Constitutional: Negative for fever, chills, diaphoresis, appetite change and fatigue. HENT: Negative for sore throat, trouble swallowing and voice change. Respiratory: Negative for cough, positive for shortness of breath no wheezing.    Cardiovascular: Negative for chest pain positive for SOB with one flight of stairs exertion, no pitting LE edema. Gastrointestinal: Positive for nausea, no vomiting, abdominal distention, constipation, no diarrhea, blood in stool, anal bleeding or rectal pain. Musculoskeletal: Negative for joint swelling and arthralgias. Skin: Warm and dry, well perfused. Neurological: Negative for seizures, syncope, speech difficulty and weakness. Hematological/Lymphatic: Negative for adenopathy. No history of DVT/PE. Does not bruise/bleed easily. Psychiatric/Behavioral: Negative for agitation. All others reviewed and negative. Physical Exam:  Vital Signs:   Vitals:    03/04/21 0214   BP: (!) 159/59   Pulse: 73   Resp: 14   Temp: 98.2 °F (36.8 °C)   SpO2: 98%     Body mass index is 47.6 kg/m². General appearance: Pt Alert and oriented, in no apparent acute distress. HEENT:  KATE, Conjunctiva clear. EOMI, No JVDs, Bruits, Megalies: neither thyroid nor lymph nodes. Lungs: Clear to auscultation bilaterally. Heart: Regular rate and rhythm, S1, S2 normal, no murmur, rub or gallop. Abdomen: Non tender. Non distended. Positive bowel sounds. No hernias noted, no masses palpable. Extremities: Warm, well perfused, no cyanosis or edema. Skin: Skin color, texture, turgor normal except left upper medial thigh 1.5 cm scab, with surrounding erythema, BUT NO fluctuance, no drainable abscess. Neurologic: Grossly normal, Cranial nerves from II to XII intact, Deep tendon reflexes normal.  Lymph nodes: No palpable LNs, Cervical, groins, abdominal.  Musculoskeletal: Bilateral Upper and lower extremities ROM within normal limits.        Radiologic / Imaging / TESTING  Echo Pharmacological Stress Imaging    Result Date: 2/10/2021  Stress Echocardiography Report  Demographics   Patient Name       [facility]  Date of Study       02/10/2021   Date of Birth      1970         Gender              Female   Age                44 year(s) Race                   Patient Number     [id number]         Room Number         2234   Visit Number       [id number]   Corporate ID       [id number]   Accession Number   [id number]         Sonographer         Laura Sixto                                                            RDCS, RDMS, RVT   Ordering Physician Reyna Forde MD      Physician           Katja Sánchez MD  Procedure Type of Study   Stress procedure:Pharmacological, ECHOCARDIOGRAM PHARMACOLOGICAL STRESS  TEST. Procedure Date Date: 02/10/2021 Start: 02:03 PM Study Location: [facility] Technical Quality: Fair visualization Indications:Chest pain. Patient Status: Routine Contrast Medium: Definity. Amount - 6 ml Height: 64 inches Weight: 280 pounds BSA: 2.26 m2 BMI: 48.06 kg/m2 Rhythm: Sinus rhythm HR: 83 bpm BP: 179/70 mmHg  Rest   ECG  nsr   Stress   Stress Type: Pharmacologic   Peak HR: 151 bpm                          HR BP Product: 14142  Peak BP: 196/80 mmHg  Predicted HR: 168 bpm  % of predicted HR: 90   Reason for Termination: Target heart rate   Stress Interpretation   normal stress echo, no regional wall motion abnormalities noted. normal  LVEF. The patient exercised according to the DOBUTAMINE for 12:41 min:s, achieving  a work level of Max. METS: 1.00. The resting heart rate of 82 bpm min to a maximal heart rate of  153 bpm. This value represents  91 % of the maximal, age-predicted heart rate. The resting blood pressure of  179/70 mmHg , min to a  maximum blood pressure of 196/80 mmHg. The exercise test was stopped due to  [address] Results   ECG  nsr   Arrhythmias  none   Symptoms  none  Stress Protocol: Pharmacologic - Dobutamine +--------+----------+-----+------+-----------+------+----------+----------+--+ ! Stage # ! Stage Name! Time ! Dosage! Other      ! Dosage! Heart Rate! Blood     ! CP! !        !          !     ! !Medication !      ! !Pressure  !  ! +--------+----------+-----+------+-----------+------+----------+----------+--+ !1.0     ! !05:00!5.00  !           !      !          !          !  ! +--------+----------+-----+------+-----------+------+----------+----------+--+ !2.0     !          !10:00!10.00 !           !      !88        !190/57    !  ! +--------+----------+-----+------+-----------+------+----------+----------+--+ !3.0     !          !15:00!20.00 !           !      !110       !190/57    !  ! +--------+----------+-----+------+-----------+------+----------+----------+--+ !4.0     !          !20:00!30.00 !           !      !151       ! 196/80    !  ! +--------+----------+-----+------+-----------+------+----------+----------+--+ ! Recovery! Recovery  !     !      !           !      !125       !166/92    !  ! +--------+----------+-----+------+-----------+------+----------+----------+--+ ! Recovery! Recovery  !     !      !           !      !96        !143/109   !  ! +--------+----------+-----+------+-----------+------+----------+----------+--+ ! Recovery! Recovery  !     !      !           !      !81        !117/102   !  ! +--------+----------+-----+------+-----------+------+----------+----------+--+  Conclusions   Summary  Base resting (heart rate: 84bpm):  Left ventricular systolic function is normal.  Ejection fraction is visually estimated at 55%. No regional wall motion abnormalities noted. Low stress (Dobutamine at 20mcg, heart rate: 112bpm):  Left ventricular systolic function is hyperdynamic. Ejection fraction is visually estimated at 60%. No regional wall motion abnormalities noted. Peak stress (Dobutamine at 30mcg, heart rate: 150bpm):  Left ventricular systolic is hyperdynamic. Ejection fraction is visually estimated at >60%  No regional wall motion abnormalities.    Resting (heart rate: 89bpm):  Left ventricular systolic function is normal.  Ejection fraction is visually estimated at 55%.  No regional wall motion abnormalities. Impression:  Normal stress echo. No regional wall motion abnormalities. Left ventricular  hypertrophy noted. Signature   ------------------------------------------------------------------  Electronically signed by Ana María Torres MD  (Interpreting physician) on 02/10/2021 at 03:37 PM  ------------------------------------------------------------------      Xr Foot Left (min 3 Views)    Result Date: 3/3/2021  EXAMINATION: THREE XRAY VIEWS OF THE LEFT FOOT 3/3/2021 6:51 pm COMPARISON: None. HISTORY: ORDERING SYSTEM PROVIDED HISTORY: left great toe redness TECHNOLOGIST PROVIDED HISTORY: Reason for exam:->left great toe redness Reason for Exam: left foot pain Acuity: Acute Type of Exam: Initial Additional signs and symptoms: na Relevant Medical/Surgical History: copd, hypertension FINDINGS: Anatomic alignment. Fusion again noted across the 1st MTP joint. No acute fracture. No destructive bony abnormality. Soft tissue swelling surrounding the great toe. Unchanged flattening of the distal 3rd metatarsal head. No acute bony or joint abnormality Soft tissue swelling surrounding the great toe     Ct Head Wo Contrast    Result Date: 2/11/2021  EXAMINATION: CT OF THE HEAD WITHOUT CONTRAST  2/11/2021 12:53 pm TECHNIQUE: CT of the head was performed without the administration of intravenous contrast. Dose modulation, iterative reconstruction, and/or weight based adjustment of the mA/kV was utilized to reduce the radiation dose to as low as reasonably achievable. COMPARISON: 11/03/2020 HISTORY: ORDERING SYSTEM PROVIDED HISTORY: \"seizure\" TECHNOLOGIST PROVIDED HISTORY: Reason for exam:->\"seizure\" Has a \"code stroke\" or \"stroke alert\" been called? ->No Is the patient pregnant?->No Reason for Exam: \"seizure\" Acuity: Acute Type of Exam: Initial FINDINGS: BRAIN/VENTRICLES:  The ventricles and cisternal spaces are normal in size, shape, and configuration for the age of the patient. No areas of abnormal attenuation are identified in the brain parenchyma. There is no midline shift or mass effect. No hemorrhage is identified in the brain parenchyma. MRI is more sensitive for evaluation of the brain parenchyma if indicated. ORBITS: The visualized portion of the orbits demonstrate no acute abnormality. SINUSES:  The visualized paranasal sinuses and mastoid air cells are clear. SOFT TISSUES/SKULL:  No acute abnormality of the visualized skull or soft tissues. No acute intracranial abnormality. Ct Abdomen Pelvis W Iv Contrast    Result Date: 2/9/2021  EXAMINATION: CT OF THE ABDOMEN AND PELVIS WITH CONTRAST 2/9/2021 9:04 pm TECHNIQUE: CT of the abdomen and pelvis was performed with the administration of intravenous contrast. Multiplanar reformatted images are provided for review. Dose modulation, iterative reconstruction, and/or weight based adjustment of the mA/kV was utilized to reduce the radiation dose to as low as reasonably achievable. COMPARISON: 110 HISTORY: ORDERING SYSTEM PROVIDED HISTORY: abdominal pain TECHNOLOGIST PROVIDED HISTORY: Reason for exam:->abdominal pain Reason for Exam: abdominal pain Acuity: Acute Type of Exam: Initial Additional signs and symptoms: surg. hx; partial lung removal left. gastric sleeve,hysterectomy,appendectomy,gallbladder. Relevant Medical/Surgical History: 90 ml isovue 370 used FINDINGS: Lower Chest:  Visualized portion of the lower chest demonstrates no acute abnormality. Organs: Cholecystectomy. Extensive pneumobilia. Splenomegaly. The visualized portions of the liver, spleen, pancreas and adrenal glands demonstrate no acute abnormality. No biliary ductal dilatation. The kidneys appear normal in size and demonstrate symmetric enhancement. No focal renal mass. No hydronephrosis. No perinephric stranding. 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  GI/Bowel: Gastric sutures appear intact no evidence of bowel wall thickening or bowel obstruction. Pelvis: The urinary bladder appears unremarkable. The pelvic organs demonstrate no acute abnormality. Peritoneum/Retroperitoneum: The abdominal aorta is normal in caliber. No gross lymphadenopathy, fluid collection, or free air. Bones/Soft Tissues: No acute findings. Postsurgical findings without apparent complication. Xr Chest Portable    Result Date: 3/4/2021  EXAMINATION: ONE XRAY VIEW OF THE CHEST 3/4/2021 12:10 am COMPARISON: None. HISTORY: ORDERING SYSTEM PROVIDED HISTORY: transient hypoxia TECHNOLOGIST PROVIDED HISTORY: Reason for exam:->transient hypoxia Reason for Exam: transient hypoxia Acuity: Acute Type of Exam: Initial FINDINGS: Central line distal tip projects at mid SVC. There is mild cardiomegaly. The mediastinal silhouette and hilar contours are normal. The lungs are clear with no focal consolidation, pleural effusion or pneumothorax. The overlying soft tissue and osseous structures appear unremarkable. No acute intrathoracic pathology. Mild cardiomegaly. Cta Pulmonary W Contrast    Result Date: 2/9/2021  EXAMINATION: CTA OF THE CHEST 2/9/2021 9:01 pm TECHNIQUE: CTA of the chest was performed after the administration of intravenous contrast.  Multiplanar reformatted images are provided for review. MIP images are provided for review. Dose modulation, iterative reconstruction, and/or weight based adjustment of the mA/kV was utilized to reduce the radiation dose to as low as reasonably achievable. COMPARISON: July 21, 2020. January 10, 2021.  HISTORY: ORDERING SYSTEM PROVIDED HISTORY: chest pain, rule out PE TECHNOLOGIST PROVIDED HISTORY: Reason for exam:->chest pain, rule out PE Reason for Exam: chest pain, rule out PE Acuity: Acute Type of Exam: Initial Additional signs and symptoms: no Relevant Medical/Surgical History: 90 ml isovue 370 used FINDINGS: Life-support devices: Left subclavian port with tip in the proximal SVC. Pulmonary Arteries: Pulmonary arteries are adequately opacified for evaluation. No evidence of intraluminal filling defect to suggest pulmonary embolism. Main pulmonary artery is normal in caliber. Mediastinum: No evidence of mediastinal lymphadenopathy. The heart and pericardium demonstrate no acute abnormality. There is no acute abnormality of the thoracic aorta. Lungs/pleura: Respiratory motion. Expiratory imaging. Diffuse bilateral ground-glass opacities may represent a combination of respiratory motion and atelectasis. Multiple subtle nodular opacities in the left lower lobe, the largest measuring up to 9 mm. No suspicious pulmonary mass or lobar consolidation. No endoluminal lesion. No pleural effusion or pneumothorax. S Upper Abdomen: Please refer to concurrent CT abdomen pelvis report for intra-findings. Soft Tissues/Bones: No acute bone or soft tissue abnormality. 1.  No acute pulmonary thromboembolic disease. 2.  Diffuse bilateral ground-glass pulmonary opacities may represent a combination of respiratory motion and atelectasis. Underlying mild pulmonary edema or infection is difficult to entirely exclude. 3.  Multiple small left lower lobe nodular opacities, the largest measuring up to 9 mm. Findings were not present on the prior study from January 10, 2021 and could represent developing infection. Follow-up chest CT in 2-3 months may be helpful to document improvement and evaluate for malignant potential.     Vl Dup Lower Extremity Venous Left    Result Date: 3/4/2021  EXAMINATION: DUPLEX VENOUS ULTRASOUND OF THE LEFT LOWER EXTREMITY 3/4/2021 6:30 am TECHNIQUE: Duplex ultrasound using B-mode/gray scaled imaging and Doppler spectral analysis and color flow was obtained of the left lower extremity. COMPARISON: None.  HISTORY: ORDERING SYSTEM PROVIDED HISTORY: Left lower ext swelling TECHNOLOGIST PROVIDED HISTORY: Reason for exam:->Left lower ext swelling Reason for Exam: Left calf swelling Acuity: Acute Type of Exam: Initial FINDINGS: The visualized veins of the left lower extremity are patent and free of echogenic thrombus. The veins demonstrate good compressibility with normal color flow study and spectral analysis. No evidence of DVT in the left lower extremity.         Labs:    Recent Results (from the past 24 hour(s))   CBC Auto Differential    Collection Time: 03/03/21  8:12 PM   Result Value Ref Range    WBC 4.5 4.0 - 10.5 K/CU MM    RBC 3.95 (L) 4.2 - 5.4 M/CU MM    Hemoglobin 10.2 (L) 12.5 - 16.0 GM/DL    Hematocrit 32.3 (L) 37 - 47 %    MCV 81.8 78 - 100 FL    MCH 25.8 (L) 27 - 31 PG    MCHC 31.6 (L) 32.0 - 36.0 %    RDW 14.3 11.7 - 14.9 %    Platelets 701 073 - 189 K/CU MM    MPV 10.1 7.5 - 11.1 FL    Differential Type AUTOMATED DIFFERENTIAL     Segs Relative 53.7 36 - 66 %    Lymphocytes % 36.5 24 - 44 %    Monocytes % 5.8 (H) 0 - 4 %    Eosinophils % 3.1 (H) 0 - 3 %    Basophils % 0.7 0 - 1 %    Segs Absolute 2.4 K/CU MM    Lymphocytes Absolute 1.6 K/CU MM    Monocytes Absolute 0.3 K/CU MM    Eosinophils Absolute 0.1 K/CU MM    Basophils Absolute 0.0 K/CU MM    Nucleated RBC % 0.0 %    Total Nucleated RBC 0.0 K/CU MM    Total Immature Neutrophil 0.01 K/CU MM    Immature Neutrophil % 0.2 0 - 0.43 %   Comprehensive Metabolic Panel    Collection Time: 03/03/21  8:12 PM   Result Value Ref Range    Sodium 139 135 - 145 MMOL/L    Potassium 4.2 3.5 - 5.1 MMOL/L    Chloride 107 99 - 110 mMol/L    CO2 26 21 - 32 MMOL/L    BUN 15 6 - 23 MG/DL    CREATININE 0.8 0.6 - 1.1 MG/DL    Glucose 84 70 - 99 MG/DL    Calcium 9.5 8.3 - 10.6 MG/DL    Albumin 3.7 3.4 - 5.0 GM/DL    Total Protein 6.5 6.4 - 8.2 GM/DL    Total Bilirubin 0.2 0.0 - 1.0 MG/DL    ALT 14 10 - 40 U/L    AST 17 15 - 37 IU/L    Alkaline Phosphatase 82 40 - 129 IU/L    GFR Non-African American >60 >60 mL/min/1.73m2    GFR African American >60 >60 mL/min/1.73m2    Anion Gap 6 4 - 16   Urinalysis    Collection Time: 03/03/21  8:12 PM   Result Value Ref Range    Color, UA YELLOW YELLOW    Clarity, UA SLIGHTLY CLOUDY (A) CLEAR    Glucose, Urine NEGATIVE NEGATIVE MG/DL    Bilirubin Urine NEGATIVE NEGATIVE MG/DL    Ketones, Urine NEGATIVE NEGATIVE MG/DL    Specific Gravity, UA 1.030 1.001 - 1.035    Blood, Urine NEGATIVE NEGATIVE    pH, Urine 5.0 5.0 - 8.0    Protein, UA 30 (A) NEGATIVE MG/DL    Urobilinogen, Urine NEGATIVE 0.2 - 1.0 MG/DL    Nitrite Urine, Quantitative NEGATIVE NEGATIVE    Leukocyte Esterase, Urine TRACE (A) NEGATIVE    RBC, UA <1 0 - 6 /HPF    WBC, UA 7 (H) 0 - 5 /HPF    Bacteria, UA NEGATIVE NEGATIVE /HPF    Squam Epithel, UA 7 /HPF    Mucus, UA MANY (A) NEGATIVE HPF    Trichomonas, UA NONE SEEN NONE SEEN /HPF    Hyaline Casts, UA 10 /LPF    Ca Oxalate Zoila, UA MANY /HPF   Lipase    Collection Time: 03/03/21  8:12 PM   Result Value Ref Range    Lipase 18 13 - 60 IU/L   Sedimentation Rate    Collection Time: 03/03/21  8:12 PM   Result Value Ref Range    Sed Rate 25 0 - 30 MM/HR   C-Reactive Protein    Collection Time: 03/03/21  8:12 PM   Result Value Ref Range    CRP, High Sensitivity 4.7 mg/L   COVID-19, Rapid    Collection Time: 03/03/21 11:45 PM    Specimen: Nasopharyngeal   Result Value Ref Range    Source THROAT     SARS-CoV-2, NAAT NOT DETECTED        Diagnosis:  Patient Active Problem List   Diagnosis    Fibromyalgia    Lupus (systemic lupus erythematosus) (HCC)    Chronic pancreatitis (HCC)    HTN (hypertension)    Generalized abdominal pain    Frequent UTI    Gastroesophageal reflux disease without esophagitis    Depression    Fatty liver disease, nonalcoholic    Arthritis    Bilateral low back pain with left-sided sciatica    Intractable vomiting with nausea    Hiatal hernia    Status post laparoscopic sleeve gastrectomy    Pseudoseizure    Chronic pain syndrome    Drug-seeking/Aberrant behavior    Lymph node disorder    Morbid obesity with BMI of 40.0-44.9, adult (HCC)    Iron deficiency anemia secondary to blood loss (chronic)    Encounter for weight management    Intractable nausea and vomiting    Seizure (HCC)    Abdominal pain    Anxiety    RUQ pain    Intractable vomiting    Status post bariatric surgery    Morbid obesity with BMI of 45.0-49.9, adult (HCC)    Discoloration of skin of flank resembling ecchymosis    Morbid obesity with BMI of 50.0-59.9, adult (HCC)    Chest pain of uncertain etiology    Cellulitis of left thigh       Assessment & plan:  Nery Perez is a very pleasant 44 y.o. female presenting with left upper thigh skin cellulitis surrounding a skin scab, NOT abscess, this cellulitis WILL respond to topical Ab, and continue IV Ab till seen by PAVAN Lopez Thank you doctor very much for your consultation and for the opportunity to take care of Mrs Nery Perez with you, I'll follow along with you and I'll update you on any new events in her care.    ____________________________________________    Laura Martinez MD, FACS, FICS    3/4/2021  7:35 AM      Patient was seen with total face to face time of 45 minutes. More than 50% of this visit was counseling and education as above in my assessment and plan section of my note.

## 2021-03-04 NOTE — ED NOTES
Report given to 17[address] Shawn Chavez and Patti RN; care transferred at this time.       Danelle Bernheim, RN  03/03/21 6395

## 2021-03-05 LAB
ALBUMIN SERPL-MCNC: 3.9 GM/DL (ref 3.4–5)
ALP BLD-CCNC: 95 IU/L (ref 40–128)
ALT SERPL-CCNC: 16 U/L (ref 10–40)
ANION GAP SERPL CALCULATED.3IONS-SCNC: 8 MMOL/L (ref 4–16)
AST SERPL-CCNC: 17 IU/L (ref 15–37)
BASOPHILS ABSOLUTE: 0 K/CU MM
BASOPHILS RELATIVE PERCENT: 0.6 % (ref 0–1)
BILIRUB SERPL-MCNC: 0.2 MG/DL (ref 0–1)
BUN BLDV-MCNC: 9 MG/DL (ref 6–23)
CALCIUM SERPL-MCNC: 9.2 MG/DL (ref 8.3–10.6)
CHLORIDE BLD-SCNC: 107 MMOL/L (ref 99–110)
CO2: 27 MMOL/L (ref 21–32)
CREAT SERPL-MCNC: 0.9 MG/DL (ref 0.6–1.1)
DIFFERENTIAL TYPE: ABNORMAL
DOSE AMOUNT: ABNORMAL
DOSE AMOUNT: NORMAL
DOSE TIME: ABNORMAL
DOSE TIME: NORMAL
EOSINOPHILS ABSOLUTE: 0.1 K/CU MM
EOSINOPHILS RELATIVE PERCENT: 3.9 % (ref 0–3)
GFR AFRICAN AMERICAN: >60 ML/MIN/1.73M2
GFR NON-AFRICAN AMERICAN: >60 ML/MIN/1.73M2
GLUCOSE BLD-MCNC: 81 MG/DL (ref 70–99)
HCT VFR BLD CALC: 34.1 % (ref 37–47)
HEMOGLOBIN: 10.3 GM/DL (ref 12.5–16)
IGA: 108 MG/DL (ref 69–382)
IGG,SERUM: 877 MG/DL (ref 723–1685)
IGM,SERUM: 125 MG/DL (ref 62–277)
IMMATURE NEUTROPHIL %: 0.3 % (ref 0–0.43)
LYMPHOCYTES ABSOLUTE: 1.4 K/CU MM
LYMPHOCYTES RELATIVE PERCENT: 40.3 % (ref 24–44)
MCH RBC QN AUTO: 25.2 PG (ref 27–31)
MCHC RBC AUTO-ENTMCNC: 30.2 % (ref 32–36)
MCV RBC AUTO: 83.6 FL (ref 78–100)
MONOCYTES ABSOLUTE: 0.4 K/CU MM
MONOCYTES RELATIVE PERCENT: 12.8 % (ref 0–4)
NUCLEATED RBC %: 0 %
PDW BLD-RTO: 14.2 % (ref 11.7–14.9)
PLATELET # BLD: 156 K/CU MM (ref 140–440)
PMV BLD AUTO: 9.7 FL (ref 7.5–11.1)
POTASSIUM SERPL-SCNC: 4.2 MMOL/L (ref 3.5–5.1)
RBC # BLD: 4.08 M/CU MM (ref 4.2–5.4)
SEGMENTED NEUTROPHILS ABSOLUTE COUNT: 1.4 K/CU MM
SEGMENTED NEUTROPHILS RELATIVE PERCENT: 42.1 % (ref 36–66)
SODIUM BLD-SCNC: 142 MMOL/L (ref 135–145)
TOTAL IMMATURE NEUTOROPHIL: 0.01 K/CU MM
TOTAL NUCLEATED RBC: 0 K/CU MM
TOTAL PROTEIN: 6.3 GM/DL (ref 6.4–8.2)
VANCOMYCIN TROUGH: 25.9 UG/ML (ref 10–20)
VANCOMYCIN TROUGH: NORMAL UG/ML (ref 10–20)
WBC # BLD: 3.4 K/CU MM (ref 4–10.5)

## 2021-03-05 PROCEDURE — 6370000000 HC RX 637 (ALT 250 FOR IP): Performed by: INTERNAL MEDICINE

## 2021-03-05 PROCEDURE — 2580000003 HC RX 258: Performed by: INTERNAL MEDICINE

## 2021-03-05 PROCEDURE — 87449 NOS EACH ORGANISM AG IA: CPT

## 2021-03-05 PROCEDURE — 99231 SBSQ HOSP IP/OBS SF/LOW 25: CPT | Performed by: SURGERY

## 2021-03-05 PROCEDURE — 6370000000 HC RX 637 (ALT 250 FOR IP): Performed by: PHYSICIAN ASSISTANT

## 2021-03-05 PROCEDURE — 85025 COMPLETE CBC W/AUTO DIFF WBC: CPT

## 2021-03-05 PROCEDURE — 36415 COLL VENOUS BLD VENIPUNCTURE: CPT

## 2021-03-05 PROCEDURE — 99232 SBSQ HOSP IP/OBS MODERATE 35: CPT | Performed by: INTERNAL MEDICINE

## 2021-03-05 PROCEDURE — 82784 ASSAY IGA/IGD/IGG/IGM EACH: CPT

## 2021-03-05 PROCEDURE — 80053 COMPREHEN METABOLIC PANEL: CPT

## 2021-03-05 PROCEDURE — 6360000002 HC RX W HCPCS: Performed by: INTERNAL MEDICINE

## 2021-03-05 PROCEDURE — 1200000000 HC SEMI PRIVATE

## 2021-03-05 PROCEDURE — 80202 ASSAY OF VANCOMYCIN: CPT

## 2021-03-05 RX ADMIN — VANCOMYCIN HYDROCHLORIDE 1000 MG: 1 INJECTION, POWDER, LYOPHILIZED, FOR SOLUTION INTRAVENOUS at 17:24

## 2021-03-05 RX ADMIN — GABAPENTIN 800 MG: 400 CAPSULE ORAL at 09:26

## 2021-03-05 RX ADMIN — LEVETIRACETAM 1000 MG: 500 TABLET, FILM COATED ORAL at 09:25

## 2021-03-05 RX ADMIN — SODIUM CHLORIDE, PRESERVATIVE FREE 10 ML: 5 INJECTION INTRAVENOUS at 21:37

## 2021-03-05 RX ADMIN — GABAPENTIN 800 MG: 400 CAPSULE ORAL at 15:57

## 2021-03-05 RX ADMIN — ESTRADIOL 0.5 MG: 1 TABLET ORAL at 09:26

## 2021-03-05 RX ADMIN — PROMETHAZINE HYDROCHLORIDE 25 MG: 25 TABLET ORAL at 05:36

## 2021-03-05 RX ADMIN — GABAPENTIN 800 MG: 400 CAPSULE ORAL at 21:37

## 2021-03-05 RX ADMIN — HYDROXYZINE PAMOATE 50 MG: 25 CAPSULE ORAL at 05:38

## 2021-03-05 RX ADMIN — VANCOMYCIN HYDROCHLORIDE 1000 MG: 1 INJECTION, POWDER, LYOPHILIZED, FOR SOLUTION INTRAVENOUS at 05:36

## 2021-03-05 RX ADMIN — MORPHINE SULFATE 2 MG: 2 INJECTION, SOLUTION INTRAMUSCULAR; INTRAVENOUS at 21:37

## 2021-03-05 RX ADMIN — PAROXETINE 50 MG: 10 TABLET, FILM COATED ORAL at 21:37

## 2021-03-05 RX ADMIN — PROMETHAZINE HYDROCHLORIDE 25 MG: 25 TABLET ORAL at 21:37

## 2021-03-05 RX ADMIN — MORPHINE SULFATE 2 MG: 2 INJECTION, SOLUTION INTRAMUSCULAR; INTRAVENOUS at 15:57

## 2021-03-05 RX ADMIN — LEVETIRACETAM 1000 MG: 500 TABLET, FILM COATED ORAL at 21:37

## 2021-03-05 RX ADMIN — TIZANIDINE 4 MG: 4 TABLET ORAL at 21:37

## 2021-03-05 RX ADMIN — MORPHINE SULFATE 2 MG: 2 INJECTION, SOLUTION INTRAMUSCULAR; INTRAVENOUS at 05:36

## 2021-03-05 RX ADMIN — LEVOTHYROXINE SODIUM 125 MCG: 125 TABLET ORAL at 21:37

## 2021-03-05 RX ADMIN — TIZANIDINE 4 MG: 4 TABLET ORAL at 10:50

## 2021-03-05 RX ADMIN — SODIUM CHLORIDE, PRESERVATIVE FREE 10 ML: 5 INJECTION INTRAVENOUS at 09:26

## 2021-03-05 RX ADMIN — ONDANSETRON 4 MG: 2 INJECTION INTRAMUSCULAR; INTRAVENOUS at 10:50

## 2021-03-05 RX ADMIN — ONDANSETRON 4 MG: 2 INJECTION INTRAMUSCULAR; INTRAVENOUS at 15:57

## 2021-03-05 RX ADMIN — ATENOLOL 25 MG: 25 TABLET ORAL at 09:25

## 2021-03-05 RX ADMIN — ENOXAPARIN SODIUM 40 MG: 40 INJECTION SUBCUTANEOUS at 09:26

## 2021-03-05 RX ADMIN — MORPHINE SULFATE 2 MG: 2 INJECTION, SOLUTION INTRAMUSCULAR; INTRAVENOUS at 10:50

## 2021-03-05 RX ADMIN — HYDROXYZINE PAMOATE 50 MG: 25 CAPSULE ORAL at 21:37

## 2021-03-05 ASSESSMENT — PAIN SCALES - GENERAL
PAINLEVEL_OUTOF10: 7
PAINLEVEL_OUTOF10: 7
PAINLEVEL_OUTOF10: 8
PAINLEVEL_OUTOF10: 8
PAINLEVEL_OUTOF10: 4

## 2021-03-05 ASSESSMENT — PAIN DESCRIPTION - PAIN TYPE: TYPE: CHRONIC PAIN

## 2021-03-05 NOTE — PROGRESS NOTES
[address] [place] [address]  HOSPITALIST PROGRESS NOTE                       Name:  Darwin Way /Age/Sex: 1970  (44 y.o. female)   MRN & CSN:  [id number] & [id number] Admission Date/Time: 3/3/2021  5:44 PM   Location:  [facility] [id number]- Attending:  Krishna Shah MD                                                  HPI  Darwin Way is a 44 y.o. female who presents with left thigh and abdominal pain    SUBJECTIVE  Reports some improvement today, no nausea but with intermittent abdominal pain, left thigh lesion improved    10 point review of systems reviewed and negative unless noted above. ALLERGIES:   Allergies   Allergen Reactions    Aspirin Palpitations     \"My Heart Rate Elevates\"    Shellfish-Derived Products Swelling    Toradol [Ketorolac Tromethamine] Rash    Compazine [Prochlorperazine]     Reglan [Metoclopramide] Itching       PCP: Maday Taylor MD    PAST MEDICAL HISTORY, SURGICAL HISTORY, SOCIAL HISTORY and  HOME MEDICATIONS all reviewed. OBJECTIVE  Vitals:    21 0800 21 2040 21 0204 21 0915   BP: 129/86 (!) 136/58 134/69 (!) 109/54   Pulse: 62 73 53 57   Resp:    Temp: 98.1 °F (36.7 °C) 99.3 °F (37.4 °C) 98.7 °F (37.1 °C) 97.9 °F (36.6 °C)   TempSrc: Oral Oral Oral Oral   SpO2: 96% 93% 96% 97%   Weight:       Height:           PHYSICAL EXAM   GEN Awake female, sitting upright in bed in no apparent distress  EYES Pupils are equally round. No scleral erythema, discharge, or conjunctivitis. HENT Mucous membranes are moist. Oral pharynx without exudates, no evidence of thrush. NECK Supple, no apparent thyromegaly or masses. RESP Clear to auscultation, no wheezes, rales or rhonchi. Symmetric chest movement   CARDIO/VASC S1/S2 auscultated. Regular rate without appreciable murmurs, rubs, or gallops. No JVD or carotid bruits. Peripheral pulses equal bilaterally and palpable. No peripheral edema.   GI Abdomen is soft without significant tenderness, masses, or guarding. Bowel sounds are normoactive.  No costovertebral angle tenderness. Normal appearing external genitalia. HEME/LYMPH No palpable cervical lymphadenopathy and no hepatosplenomegaly. No petechiae or ecchymoses. MSK Spontaneous movement of all extremities. No gross joint deformities. SKIN Normal coloration, warm, dry. NEURO Cranial nerves appear grossly intact, normal speech, no lateralizing weakness. PSYCH Awake, alert, oriented x 4. Affect appropriate. INTAKE: No intake/output data recorded. OUTPUT: No intake/output data recorded. LABS  Recent Labs     03/03/21 2012   WBC 4.5   HGB 10.2*   HCT 32.3*         Recent Labs     03/03/21 2012      K 4.2      CO2 26   BUN 15   CREATININE 0.8     Recent Labs     03/03/21 2012   AST 17   ALT 14   BILITOT 0.2   ALKPHOS 82     No results for input(s): INR in the last 72 hours. No results for input(s): CKTOTAL, CKMB, CKMBINDEX, TROPONINT in the last 72 hours.        Abnormal labs for today noted      Imaging:     ECHO:    Microbiology:  Blood culture:    Urine culture:    Sputum culture:    Procedures done this admission:    MEDS  Scheduled Meds:   atenolol  25 mg Oral Daily    estradiol  0.5 mg Oral Daily    ferrous sulfate  325 mg Oral Daily with breakfast    levETIRAcetam  1,000 mg Oral BID    levothyroxine  125 mcg Oral Nightly    PARoxetine  50 mg Oral Nightly    sodium chloride flush  10 mL Intravenous 2 times per day    enoxaparin  40 mg Subcutaneous Daily    gabapentin  800 mg Oral TID    vancomycin  1,000 mg Intravenous Q8H     Continuous Infusions:  PRN Meds:albuterol, dicyclomine, hydrOXYzine, oxyCODONE-acetaminophen, promethazine, sodium chloride flush, magnesium hydroxide, acetaminophen **OR** acetaminophen, ondansetron **OR** ondansetron, morphine, tiZANidine        ASSESSMENT and PLAN  Hospital Day: 3    1-Left thigh cellulitic spot with LLE edema- DVT study negative, does report hx of MRSA- noted

## 2021-03-05 NOTE — PROGRESS NOTES
Pt continuing to complain of significant nausea but continues to request tea and other drinks one after the other. Pt also noted to have tolerated taking evening medications well. Pt has not had any episodes of emesis. She is resting in bed watching tv each time this RN is in the room and displays no signs of distress. Pt continues to have 8/10 abdominal pain and nausea. Pt denies any improvement but is observed to be tolerating medications and liquid diet well. Pt has been asking this RN about when they are doing \"a biopsy on my leg\" and asking if they have scheduled her to be scoped, notes from Dr. Brunilda Farah and Dr. Jose Manley read to pt and plan of care reviewed with pt.

## 2021-03-05 NOTE — PROGRESS NOTES
[address] [facility]  consulted by Dr. Warren Chakraborty for monitoring and adjustment. Indication for treatment: LLE Cellulitis  Goal trough: 10 - 15 mcg/mL    Pertinent Laboratory Values:   Temp Readings from Last 3 Encounters:   03/05/21 97.9 °F (36.6 °C) (Oral)   02/17/21 96.7 °F (35.9 °C)   02/13/21 98.2 °F (36.8 °C) (Oral)     Recent Labs     03/03/21 2012 03/05/21  0833   WBC 4.5 3.4*     Recent Labs     03/03/21 2012 03/05/21  0833   BUN 15 9   CREATININE 0.8 0.9     Estimated Creatinine Clearance: 97 mL/min (based on SCr of 0.9 mg/dL). No intake or output data in the 24 hours ending 03/05/21 1250    Pertinent Cultures:  Date    Source    Results  03/03   COVID-19   Not detected  03/04   Blood    Pending      VANCOMYCIN TROUGH:    Recent Labs     03/05/21  0505 03/05/21  0833   VANCOTROUGH ? 25.9*     VANCOMYCIN RANDOM:  No results for input(s): VANCORANDOM in the last 72 hours. Assessment:  · WBC now low, pt remains afebrile  · SCr remains normal, no UOP data  · Day(s) of therapy: 2  · Vancomycin concentration:   · 03/05 - 25.9, false trough about 3 hours post 1gm dose    Plan:  · Vanco trough this am drawn shortly after a dose was infused, 3 hours after a 1gm dose. True trough likely below 20  · Renal trends remain normal.  · Continue Vancomycin 1,000 mg IV Q 8 Hours  · Recheck the vanco level tomorrow morning to monitor for accumulation  · Pharmacy will continue to monitor patient and adjust therapy as indicated    Apoorva 3 03/06/21 @ 5:00 AM    Thank you for the consult. Jonah Cooney   3/5/2021 12:50 PM

## 2021-03-05 NOTE — PROGRESS NOTES
GENERAL SURGERY PROGRESS NOTE    CC/HPI:           Patient feels ok. . no new c/o. Vitals:    03/04/21 0214 03/04/21 0800 03/04/21 2040 03/05/21 0204   BP: (!) 159/59 129/86 (!) 136/58 134/69   Pulse: 73 62 73 53   Resp: 14 21 19 18   Temp: 98.2 °F (36.8 °C) 98.1 °F (36.7 °C) 99.3 °F (37.4 °C) 98.7 °F (37.1 °C)   TempSrc: Oral Oral Oral Oral   SpO2: 98% 96% 93% 96%   Weight: 279 lb 8 oz (126.8 kg)      Height: 5' 4.25\" (1.632 m)        No intake/output data recorded. No intake/output data recorded.     DIET CLEAR LIQUID;    Recent Results (from the past 48 hour(s))   CBC Auto Differential    Collection Time: 03/03/21  8:12 PM   Result Value Ref Range    WBC 4.5 4.0 - 10.5 K/CU MM    RBC 3.95 (L) 4.2 - 5.4 M/CU MM    Hemoglobin 10.2 (L) 12.5 - 16.0 GM/DL    Hematocrit 32.3 (L) 37 - 47 %    MCV 81.8 78 - 100 FL    MCH 25.8 (L) 27 - 31 PG    MCHC 31.6 (L) 32.0 - 36.0 %    RDW 14.3 11.7 - 14.9 %    Platelets 422 578 - 927 K/CU MM    MPV 10.1 7.5 - 11.1 FL    Differential Type AUTOMATED DIFFERENTIAL     Segs Relative 53.7 36 - 66 %    Lymphocytes % 36.5 24 - 44 %    Monocytes % 5.8 (H) 0 - 4 %    Eosinophils % 3.1 (H) 0 - 3 %    Basophils % 0.7 0 - 1 %    Segs Absolute 2.4 K/CU MM    Lymphocytes Absolute 1.6 K/CU MM    Monocytes Absolute 0.3 K/CU MM    Eosinophils Absolute 0.1 K/CU MM    Basophils Absolute 0.0 K/CU MM    Nucleated RBC % 0.0 %    Total Nucleated RBC 0.0 K/CU MM    Total Immature Neutrophil 0.01 K/CU MM    Immature Neutrophil % 0.2 0 - 0.43 %   Comprehensive Metabolic Panel    Collection Time: 03/03/21  8:12 PM   Result Value Ref Range    Sodium 139 135 - 145 MMOL/L    Potassium 4.2 3.5 - 5.1 MMOL/L    Chloride 107 99 - 110 mMol/L    CO2 26 21 - 32 MMOL/L    BUN 15 6 - 23 MG/DL    CREATININE 0.8 0.6 - 1.1 MG/DL    Glucose 84 70 - 99 MG/DL    Calcium 9.5 8.3 - 10.6 MG/DL    Albumin 3.7 3.4 - 5.0 GM/DL    Total Protein 6.5 6.4 - 8.2 GM/DL    Total Bilirubin 0.2 0.0 - 1.0 MG/DL    ALT 14 10 - 40 U/L    AST 17 15 - 37 IU/L    Alkaline Phosphatase 82 40 - 129 IU/L    GFR Non-African American >60 >60 mL/min/1.73m2    GFR African American >60 >60 mL/min/1.73m2    Anion Gap 6 4 - 16   Urinalysis    Collection Time: 03/03/21  8:12 PM   Result Value Ref Range    Color, UA YELLOW YELLOW    Clarity, UA SLIGHTLY CLOUDY (A) CLEAR    Glucose, Urine NEGATIVE NEGATIVE MG/DL    Bilirubin Urine NEGATIVE NEGATIVE MG/DL    Ketones, Urine NEGATIVE NEGATIVE MG/DL    Specific Gravity, UA 1.030 1.001 - 1.035    Blood, Urine NEGATIVE NEGATIVE    pH, Urine 5.0 5.0 - 8.0    Protein, UA 30 (A) NEGATIVE MG/DL    Urobilinogen, Urine NEGATIVE 0.2 - 1.0 MG/DL    Nitrite Urine, Quantitative NEGATIVE NEGATIVE    Leukocyte Esterase, Urine TRACE (A) NEGATIVE    RBC, UA <1 0 - 6 /HPF    WBC, UA 7 (H) 0 - 5 /HPF    Bacteria, UA NEGATIVE NEGATIVE /HPF    Squam Epithel, UA 7 /HPF    Mucus, UA MANY (A) NEGATIVE HPF    Trichomonas, UA NONE SEEN NONE SEEN /HPF    Hyaline Casts, UA 10 /LPF    Ca Oxalate Zoila, UA MANY /HPF   Lipase    Collection Time: 03/03/21  8:12 PM   Result Value Ref Range    Lipase 18 13 - 60 IU/L   Sedimentation Rate    Collection Time: 03/03/21  8:12 PM   Result Value Ref Range    Sed Rate 25 0 - 30 MM/HR   C-Reactive Protein    Collection Time: 03/03/21  8:12 PM   Result Value Ref Range    CRP, High Sensitivity 4.7 mg/L   EKG 12 Lead    Collection Time: 03/03/21  9:25 PM   Result Value Ref Range    Ventricular Rate 70 BPM    Atrial Rate 70 BPM    P-R Interval 190 ms    QRS Duration 80 ms    Q-T Interval 386 ms    QTc Calculation (Bazett) 416 ms    P Axis 43 degrees    R Axis -9 degrees    T Axis 31 degrees    Diagnosis       Normal sinus rhythm  Normal ECG  When compared with ECG of 10-FEB-2021 11:05,  No significant change was found  Confirmed by OPAL Garvin ([id number]) on 3/4/2021 5:38:38 PM     COVID-19, Rapid    Collection Time: 03/03/21 11:45 PM    Specimen: Nasopharyngeal   Result Value Ref Range    Source THROAT SARS-CoV-2, NAAT NOT DETECTED        Scheduled Meds:   atenolol  25 mg Oral Daily    estradiol  0.5 mg Oral Daily    ferrous sulfate  325 mg Oral Daily with breakfast    levETIRAcetam  1,000 mg Oral BID    levothyroxine  125 mcg Oral Nightly    PARoxetine  50 mg Oral Nightly    sodium chloride flush  10 mL Intravenous 2 times per day    enoxaparin  40 mg Subcutaneous Daily    gabapentin  800 mg Oral TID    vancomycin  1,000 mg Intravenous Q8H       Continuous Infusions:    Physical Exam:  HEENT: Anicteric sclerae, Oropharyngeal mucosae moist, pink and intact. Heart:  Normal S1 and S2, RRR  Lungs: Clear to auscultation bilaterally, No audible Wheezes or Rales. Extremities: No edema. Neuro: Alert and Oriented x 3, Non focal.  Abdomen: Soft, Benign, Non tender, Non distended, Positive bowel sounds. Incision: Nicely healing: No erythema, No discharge. Active Problems:    Cellulitis of left thigh  Resolved Problems:    * No resolved hospital problems. *      Assessment and Plan:  Basim Virk is a 44 y.o. female with chronic abdominal pain. . RUQ, s/p MULTIPLE ERCPs, and now a small left thigh superficial skin infection with SMALL cellulitis, NOT in need for surgical incision and drainage, continue Abx topically, and waiting for ID's recommendation. GI Dr Graeme Ambrose:  \"RECOMMENDATIONS:  1. Agree with present management. 2.  We will follow up on the CAT scan report. 3.  Start the patient on clear liquid diet, advance as tolerated. 4.  General surgery and ID consult in order. 5.  If the patient continues to be symptomatic with abdominal pain, we  will discuss the case with the surgical consultant, Dr. Laurel Alvarenga, for  possible diagnostic laparoscopy in view of the patient's intractable  abdominal pain requiring multiple hospitalizations and CAT scans. 6.  The case and plan have been discussed in detail with the patient. \"       I will follow.       Called by Dr Antonella Morales to biopsy the skin lesion, he

## 2021-03-05 NOTE — PROGRESS NOTES
Infectious Disease Progress Note  3/5/2021   Patient Name: Dee Voss : 1970       Reason for visit: F/u left leg ulcer? History:? Interval history noted  Denies n/v/d/f or untoward effects of antimicrobials  Physical Exam:  Vital Signs: BP (!) 109/54   Pulse 57   Temp 97.9 °F (36.6 °C) (Oral)   Resp 18   Ht 5' 4.25\" (1.632 m)   Wt 279 lb 8 oz (126.8 kg)   SpO2 97%   BMI 47.60 kg/m²     Gen: alert and oriented X3, no distress  Skin: no stigmata of endocarditis  Wounds:  Posteromedial ulcer on the upper part of the left leg. C/D/I  HEMT: AT/NC Oropharynx pink, moist, and without lesions or exudates; dentition in good state of repair  Eyes: PERRLA, EOMI, conjunctiva pink, sclera anicteric. Neck: Supple. Trachea midline. No LAD. Chest: no distress and CTA. Good air movement. Heart: RRR and no MRG. Abd: soft, non-distended, no tenderness, no hepatomegaly. Normoactive bowel sounds. Ext: no clubbing, cyanosis, or edema  Catheter Site: without erythema or tenderness  LDA:   Neuro: Mental status intact. CN 2-12 intact and no focal sensory or motor deficits     Radiologic / Imaging / TESTING  No results found. Labs:    Recent Results (from the past 24 hour(s))   Vancomycin, trough    Collection Time: 21  5:05 AM   Result Value Ref Range    Vancomycin Tr ? 10 - 20 UG/ML    DOSE AMOUNT DOSE AMT.  GIVEN - 1000 mg     DOSE TIME DOSE TIME GIVEN - 1330    Comprehensive Metabolic Panel w/ Reflex to MG    Collection Time: 21  8:33 AM   Result Value Ref Range    Sodium 142 135 - 145 MMOL/L    Potassium 4.2 3.5 - 5.1 MMOL/L    Chloride 107 99 - 110 mMol/L    CO2 27 21 - 32 MMOL/L    BUN 9 6 - 23 MG/DL    CREATININE 0.9 0.6 - 1.1 MG/DL    Glucose 81 70 - 99 MG/DL    Calcium 9.2 8.3 - 10.6 MG/DL    Albumin 3.9 3.4 - 5.0 GM/DL    Total Protein 6.3 (L) 6.4 - 8.2 GM/DL    Total Bilirubin 0.2 0.0 - 1.0 MG/DL    ALT 16 10 - 40 U/L    AST 17 15 - 37 IU/L    Alkaline Phosphatase 95 40 - 128 IU/L deficiency anemia secondary to blood loss (chronic)    Encounter for weight management    Intractable nausea and vomiting    Seizure (HCC)    Abdominal pain    Anxiety    RUQ pain    Intractable vomiting    Status post bariatric surgery    Morbid obesity with BMI of 45.0-49.9, adult (HCC)    Discoloration of skin of flank resembling ecchymosis    Morbid obesity with BMI of 50.0-59.9, adult (HCC)    Chest pain of uncertain etiology    Cellulitis of left thigh       Active Problems  Active Problems:    Cellulitis of left thigh  Resolved Problems:    * No resolved hospital problems. *      Impression and plan   Summary and rationale: Patient is a 44 y.o.  female medical history of lupus, obesity, GERD. Known to me from previous admissions for catheter related bloodstream infection, MSSA osteomyelitis of the left foot. Has a 2-week ulcer on the posterior medial aspect of the upper third of the left leg. Immunoglobulin assay-IgG IgA and IgM are within normal limits.  Clinical status: Inflammation around the wound remains unchanged. Does not characteristically look bacterial.  Rule out fungal, AFB or atypical microbes.  Therapeutic: Continue only vancomycin (monitor trough by pharmacy)   Diagnostic: Would benefit from a biopsy of the wound for histopathology, bacterial, AFB and fungal culture. Dr. Griselda Stock acknowledges and will have it done.    F/u:   Other:        Electronically signed by: Electronically signed by Johnny Barraza MD on 3/5/2021 at 10:38 AM

## 2021-03-06 LAB
ANION GAP SERPL CALCULATED.3IONS-SCNC: 2 MMOL/L (ref 4–16)
BUN BLDV-MCNC: 12 MG/DL (ref 6–23)
CALCIUM SERPL-MCNC: 9.5 MG/DL (ref 8.3–10.6)
CHLORIDE BLD-SCNC: 106 MMOL/L (ref 99–110)
CO2: 30 MMOL/L (ref 21–32)
CREAT SERPL-MCNC: 0.9 MG/DL (ref 0.6–1.1)
GFR AFRICAN AMERICAN: >60 ML/MIN/1.73M2
GFR NON-AFRICAN AMERICAN: >60 ML/MIN/1.73M2
GLUCOSE BLD-MCNC: 102 MG/DL (ref 70–99)
GLUCOSE BLD-MCNC: 79 MG/DL (ref 70–99)
POTASSIUM SERPL-SCNC: 4.6 MMOL/L (ref 3.5–5.1)
SODIUM BLD-SCNC: 138 MMOL/L (ref 135–145)

## 2021-03-06 PROCEDURE — 2580000003 HC RX 258: Performed by: INTERNAL MEDICINE

## 2021-03-06 PROCEDURE — 80202 ASSAY OF VANCOMYCIN: CPT

## 2021-03-06 PROCEDURE — 6360000002 HC RX W HCPCS: Performed by: INTERNAL MEDICINE

## 2021-03-06 PROCEDURE — 6370000000 HC RX 637 (ALT 250 FOR IP): Performed by: PHYSICIAN ASSISTANT

## 2021-03-06 PROCEDURE — 87081 CULTURE SCREEN ONLY: CPT

## 2021-03-06 PROCEDURE — 6370000000 HC RX 637 (ALT 250 FOR IP): Performed by: INTERNAL MEDICINE

## 2021-03-06 PROCEDURE — 6360000002 HC RX W HCPCS

## 2021-03-06 PROCEDURE — 94761 N-INVAS EAR/PLS OXIMETRY MLT: CPT

## 2021-03-06 PROCEDURE — 1200000000 HC SEMI PRIVATE

## 2021-03-06 PROCEDURE — 82962 GLUCOSE BLOOD TEST: CPT

## 2021-03-06 PROCEDURE — 80048 BASIC METABOLIC PNL TOTAL CA: CPT

## 2021-03-06 RX ORDER — LORAZEPAM 2 MG/ML
INJECTION INTRAMUSCULAR
Status: COMPLETED
Start: 2021-03-06 | End: 2021-03-06

## 2021-03-06 RX ORDER — LORAZEPAM 2 MG/ML
2 INJECTION INTRAMUSCULAR EVERY 6 HOURS PRN
Status: DISCONTINUED | OUTPATIENT
Start: 2021-03-06 | End: 2021-03-08 | Stop reason: HOSPADM

## 2021-03-06 RX ADMIN — MORPHINE SULFATE 2 MG: 2 INJECTION, SOLUTION INTRAMUSCULAR; INTRAVENOUS at 16:28

## 2021-03-06 RX ADMIN — LEVETIRACETAM 1000 MG: 500 TABLET, FILM COATED ORAL at 20:32

## 2021-03-06 RX ADMIN — PROMETHAZINE HYDROCHLORIDE 25 MG: 25 TABLET ORAL at 21:03

## 2021-03-06 RX ADMIN — LEVOTHYROXINE SODIUM 125 MCG: 125 TABLET ORAL at 20:32

## 2021-03-06 RX ADMIN — ONDANSETRON 4 MG: 4 TABLET, ORALLY DISINTEGRATING ORAL at 04:04

## 2021-03-06 RX ADMIN — SODIUM CHLORIDE, PRESERVATIVE FREE 10 ML: 5 INJECTION INTRAVENOUS at 08:22

## 2021-03-06 RX ADMIN — ONDANSETRON 4 MG: 2 INJECTION INTRAMUSCULAR; INTRAVENOUS at 08:21

## 2021-03-06 RX ADMIN — ESTRADIOL 0.5 MG: 1 TABLET ORAL at 10:58

## 2021-03-06 RX ADMIN — VANCOMYCIN HYDROCHLORIDE 1000 MG: 1 INJECTION, POWDER, LYOPHILIZED, FOR SOLUTION INTRAVENOUS at 06:09

## 2021-03-06 RX ADMIN — LORAZEPAM 2 MG: 2 INJECTION INTRAMUSCULAR; INTRAVENOUS at 18:15

## 2021-03-06 RX ADMIN — TIZANIDINE 4 MG: 4 TABLET ORAL at 08:22

## 2021-03-06 RX ADMIN — LEVETIRACETAM 1000 MG: 500 TABLET, FILM COATED ORAL at 10:57

## 2021-03-06 RX ADMIN — HYDROXYZINE PAMOATE 50 MG: 25 CAPSULE ORAL at 16:28

## 2021-03-06 RX ADMIN — MORPHINE SULFATE 2 MG: 2 INJECTION, SOLUTION INTRAMUSCULAR; INTRAVENOUS at 20:33

## 2021-03-06 RX ADMIN — MORPHINE SULFATE 2 MG: 2 INJECTION, SOLUTION INTRAMUSCULAR; INTRAVENOUS at 04:04

## 2021-03-06 RX ADMIN — TIZANIDINE 4 MG: 4 TABLET ORAL at 21:03

## 2021-03-06 RX ADMIN — ONDANSETRON 4 MG: 2 INJECTION INTRAMUSCULAR; INTRAVENOUS at 16:28

## 2021-03-06 RX ADMIN — PAROXETINE 50 MG: 10 TABLET, FILM COATED ORAL at 20:31

## 2021-03-06 RX ADMIN — VANCOMYCIN HYDROCHLORIDE 1000 MG: 1 INJECTION, POWDER, LYOPHILIZED, FOR SOLUTION INTRAVENOUS at 23:41

## 2021-03-06 RX ADMIN — ENOXAPARIN SODIUM 40 MG: 40 INJECTION SUBCUTANEOUS at 10:58

## 2021-03-06 RX ADMIN — HYDROXYZINE PAMOATE 50 MG: 25 CAPSULE ORAL at 04:04

## 2021-03-06 RX ADMIN — MORPHINE SULFATE 2 MG: 2 INJECTION, SOLUTION INTRAMUSCULAR; INTRAVENOUS at 08:22

## 2021-03-06 RX ADMIN — MORPHINE SULFATE 2 MG: 2 INJECTION, SOLUTION INTRAMUSCULAR; INTRAVENOUS at 12:25

## 2021-03-06 RX ADMIN — PROMETHAZINE HYDROCHLORIDE 25 MG: 25 TABLET ORAL at 12:24

## 2021-03-06 RX ADMIN — SODIUM CHLORIDE, PRESERVATIVE FREE 10 ML: 5 INJECTION INTRAVENOUS at 20:32

## 2021-03-06 RX ADMIN — VANCOMYCIN HYDROCHLORIDE 1000 MG: 1 INJECTION, POWDER, LYOPHILIZED, FOR SOLUTION INTRAVENOUS at 16:05

## 2021-03-06 RX ADMIN — GABAPENTIN 800 MG: 400 CAPSULE ORAL at 08:21

## 2021-03-06 RX ADMIN — OXYCODONE HYDROCHLORIDE AND ACETAMINOPHEN 1 TABLET: 5; 325 TABLET ORAL at 11:04

## 2021-03-06 RX ADMIN — GABAPENTIN 800 MG: 400 CAPSULE ORAL at 13:33

## 2021-03-06 RX ADMIN — GABAPENTIN 800 MG: 400 CAPSULE ORAL at 20:32

## 2021-03-06 RX ADMIN — ATENOLOL 25 MG: 25 TABLET ORAL at 10:57

## 2021-03-06 ASSESSMENT — PAIN DESCRIPTION - ONSET
ONSET_2: ON-GOING
ONSET: ON-GOING

## 2021-03-06 ASSESSMENT — PAIN DESCRIPTION - ORIENTATION
ORIENTATION: MID
ORIENTATION_2: LEFT

## 2021-03-06 ASSESSMENT — PAIN SCALES - GENERAL
PAINLEVEL_OUTOF10: 0
PAINLEVEL_OUTOF10: 7
PAINLEVEL_OUTOF10: 5
PAINLEVEL_OUTOF10: 7
PAINLEVEL_OUTOF10: 6
PAINLEVEL_OUTOF10: 8

## 2021-03-06 ASSESSMENT — PAIN DESCRIPTION - INTENSITY: RATING_2: 5

## 2021-03-06 ASSESSMENT — PAIN DESCRIPTION - FREQUENCY
FREQUENCY: CONTINUOUS
FREQUENCY: CONTINUOUS

## 2021-03-06 ASSESSMENT — PAIN DESCRIPTION - DESCRIPTORS
DESCRIPTORS: DULL
DESCRIPTORS: ACHING
DESCRIPTORS_2: BURNING

## 2021-03-06 ASSESSMENT — PAIN DESCRIPTION - PROGRESSION
CLINICAL_PROGRESSION: NOT CHANGED
CLINICAL_PROGRESSION_2: NOT CHANGED

## 2021-03-06 ASSESSMENT — PAIN DESCRIPTION - LOCATION: LOCATION: ABDOMEN

## 2021-03-06 ASSESSMENT — PAIN - FUNCTIONAL ASSESSMENT: PAIN_FUNCTIONAL_ASSESSMENT: ACTIVITIES ARE NOT PREVENTED

## 2021-03-06 NOTE — PLAN OF CARE
Problem: Pain:  Goal: Pain level will decrease  Description: Pain level will decrease  3/6/2021 1146 by Palma Leroy RN  Outcome: Ongoing  3/5/2021 2240 by Na Skinner RN  Outcome: Ongoing  Goal: Control of acute pain  Description: Control of acute pain  3/6/2021 1146 by Palma Leroy RN  Outcome: Ongoing  3/5/2021 2240 by Na Skinner RN  Outcome: Ongoing  Goal: Control of chronic pain  Description: Control of chronic pain  3/6/2021 1146 by Palma Leroy RN  Outcome: Ongoing  3/5/2021 2240 by Na Skinner RN  Outcome: Ongoing     Problem: Nausea/Vomiting:  Goal: Absence of nausea/vomiting  Description: Absence of nausea/vomiting  Outcome: Ongoing  Goal: Able to drink  Description: Able to drink  Outcome: Ongoing  Goal: Able to eat  Description: Able to eat  Outcome: Ongoing  Goal: Ability to achieve adequate nutritional intake will improve  Description: Ability to achieve adequate nutritional intake will improve  Outcome: Ongoing

## 2021-03-06 NOTE — CODE DOCUMENTATION
Rapid response called for possible seizure. Nurse witnessed the patient vomiting and then quickly started shaking.

## 2021-03-06 NOTE — PLAN OF CARE
Problem: Pain:  Goal: Pain level will decrease  Description: Pain level will decrease  3/5/2021 2240 by Na Skinner RN  Outcome: Ongoing  3/5/2021 1844 by Gracie Butler RN  Outcome: Ongoing  Goal: Control of acute pain  Description: Control of acute pain  3/5/2021 2240 by Na Skinner RN  Outcome: Ongoing  3/5/2021 1844 by Gracie Butler RN  Outcome: Ongoing  Goal: Control of chronic pain  Description: Control of chronic pain  3/5/2021 2240 by Na Skinner RN  Outcome: Ongoing  3/5/2021 1844 by Gracie Butler RN  Outcome: Ongoing

## 2021-03-06 NOTE — CODE DOCUMENTATION
Pt quietly resting in bed. Followed command to squeeze nurse's hands. Opened eyes spontaneously, but appears very drowsy.

## 2021-03-06 NOTE — CODE DOCUMENTATION
Pt shaking and hyperventilating, with arms contractured. Eyes closed and pt not responding to verbal stimuli. Ammonia stick under pt nose, pt responding slightly by shaking her head back and forth.

## 2021-03-06 NOTE — PROGRESS NOTES
[facility] [facility]  consulted by Dr. Lurdes Elizabeth for monitoring and adjustment. Indication for treatment: LLE Cellulitis  Goal trough: 10 - 15 mcg/mL    Pertinent Laboratory Values:   Temp Readings from Last 3 Encounters:   03/06/21 97.7 °F (36.5 °C) (Oral)   02/17/21 96.7 °F (35.9 °C)   02/13/21 98.2 °F (36.8 °C) (Oral)     Recent Labs     03/03/21 2012 03/05/21  0833   WBC 4.5 3.4*     Recent Labs     03/03/21 2012 03/05/21  0833 03/06/21  0610   BUN 15 9 12   CREATININE 0.8 0.9 0.9     Estimated Creatinine Clearance: 98 mL/min (based on SCr of 0.9 mg/dL). Intake/Output Summary (Last 24 hours) at 3/6/2021 1129  Last data filed at 3/6/2021 0820  Gross per 24 hour   Intake 240 ml   Output --   Net 240 ml       Pertinent Cultures:  Date    Source    Results  03/03   COVID-19   Not detected  03/04   Blood    Pending  3/4                              MRSA screen                          Ordered      VANCOMYCIN TROUGH:    Recent Labs     03/05/21  0505 03/05/21  0833   VANCOTROUGH ? 25.9*     VANCOMYCIN RANDOM:  No results for input(s): VANCORANDOM in the last 72 hours. Assessment:  · WBC low yesterday, pt remains afebrile  · SCr remains normal, no UOP data  · Day(s) of therapy: 3  · Vancomycin concentration:   · 03/05 - 25.9, false trough about 3 hours post 1gm dose    Plan:  · Vanco trough yesterday drawn shortly after a dose was infused, 3 hours after a 1gm dose. True trough likely below 20  · Only two doses given yesterday, will allow 24 hours more of doses before rechecking the level. · Renal trends remain normal.  · Continue Vancomycin 1,000 mg IV Q 8 Hours  · Recheck the vanco level tomorrow morning to monitor for accumulation  · Pharmacy will continue to monitor patient and adjust therapy as indicated    Apoorva 3 03/07/21 @ 5:30 AM    Thank you for the consult. Galen Atkinson   3/6/2021 11:29 AM

## 2021-03-06 NOTE — PROGRESS NOTES
[address] [place] [address]  HOSPITALIST PROGRESS NOTE                       Name:  Lashaun Rivero /Age/Sex: 1970  (44 y.o. female)   MRN & CSN:  [id number] & [id number] Admission Date/Time: 3/3/2021  5:44 PM   Location:  [facility]/3006-A Attending:  Gilmar Cruz MD                                                  HPI  Lashaun Rivero is a 44 y.o. female who presents with left thigh and abdominal pain    SUBJECTIVE  Continues to complain of intermittent abdominal pain with nausea. 10 point review of systems reviewed and negative unless noted above. ALLERGIES:   Allergies   Allergen Reactions    Aspirin Palpitations     \"My Heart Rate Elevates\"    Shellfish-Derived Products Swelling    Toradol [Ketorolac Tromethamine] Rash    Compazine [Prochlorperazine]     Reglan [Metoclopramide] Itching       PCP: Verito Barillas MD    PAST MEDICAL HISTORY, SURGICAL HISTORY, SOCIAL HISTORY and  HOME MEDICATIONS all reviewed. OBJECTIVE  Vitals:    21 2135 21 0230 21 0802 21 0818   BP: (!) 160/77 113/74  (!) 147/76   Pulse: 64 52  65   Resp: 16 17 18 17   Temp: 98.4 °F (36.9 °C) 97.6 °F (36.4 °C)  97.7 °F (36.5 °C)   TempSrc: Oral Oral  Oral   SpO2: 100% 95% 94% 98%   Weight: 283 lb 8 oz (128.6 kg) 282 lb 14.4 oz (128.3 kg)     Height:           PHYSICAL EXAM   GEN Awake female, sitting upright in bed in no apparent distress  EYES Pupils are equally round. No scleral erythema, discharge, or conjunctivitis. HENT Mucous membranes are moist. Oral pharynx without exudates, no evidence of thrush. NECK Supple, no apparent thyromegaly or masses. RESP Clear to auscultation, no wheezes, rales or rhonchi. Symmetric chest movement   CARDIO/VASC S1/S2 auscultated. Regular rate without appreciable murmurs, rubs, or gallops. No JVD or carotid bruits. Peripheral pulses equal bilaterally and palpable. No peripheral edema. GI Abdomen is soft without significant tenderness, masses, or guarding.  Bowel sounds are normoactive.  No costovertebral angle tenderness. Normal appearing external genitalia. HEME/LYMPH No palpable cervical lymphadenopathy and no hepatosplenomegaly. No petechiae or ecchymoses. MSK Spontaneous movement of all extremities. No gross joint deformities. SKIN Normal coloration, warm, dry. NEURO Cranial nerves appear grossly intact, normal speech, no lateralizing weakness. PSYCH Awake, alert, oriented x 4. Affect appropriate. INTAKE: In: 240 [P.O.:240]  Out: -   OUTPUT: In: 240   Out: -     LABS  Recent Labs     03/03/21 2012 03/05/21  0833   WBC 4.5 3.4*   HGB 10.2* 10.3*   HCT 32.3* 34.1*    156      Recent Labs     03/03/21 2012 03/05/21  0833 03/06/21  0610    142 138   K 4.2 4.2 4.6    107 106   CO2 26 27 30   BUN 15 9 12   CREATININE 0.8 0.9 0.9     Recent Labs     03/03/21 2012 03/05/21  0833   AST 17 17   ALT 14 16   BILITOT 0.2 0.2   ALKPHOS 82 95     No results for input(s): INR in the last 72 hours. No results for input(s): CKTOTAL, CKMB, CKMBINDEX, TROPONINT in the last 72 hours.        Abnormal labs for today noted      Imaging:     ECHO:    Microbiology:  Blood culture:    Urine culture:    Sputum culture:    Procedures done this admission:    MEDS  Scheduled Meds:   atenolol  25 mg Oral Daily    estradiol  0.5 mg Oral Daily    ferrous sulfate  325 mg Oral Daily with breakfast    levETIRAcetam  1,000 mg Oral BID    levothyroxine  125 mcg Oral Nightly    PARoxetine  50 mg Oral Nightly    sodium chloride flush  10 mL Intravenous 2 times per day    enoxaparin  40 mg Subcutaneous Daily    gabapentin  800 mg Oral TID    vancomycin  1,000 mg Intravenous Q8H     Continuous Infusions:  PRN Meds:albuterol, dicyclomine, hydrOXYzine, oxyCODONE-acetaminophen, promethazine, sodium chloride flush, magnesium hydroxide, acetaminophen **OR** acetaminophen, ondansetron **OR** ondansetron, morphine, tiZANidine        ASSESSMENT and [facility] Day: 4    1-Left thigh cellulitic spot with LLE edema- DVT study negative, does report hx of MRSA- noted surgery consulted ID- on IV vanc, noted ID input- plan for biopsy   2-Acute on chronic abdominal pain associated with nausea- hx of gastric bypass/GERD/chronic pancreatitis-CT non-acute, advance diet as tolerated.  GI following      Other issues  -seizure  -hx of lung nodules  -Chronic pancreatitis, not in exacerbation  -Morbid obesity with BMI of 47  -Hx of gastric bypass  -depression  -chronic pain syndrome follows with pain management          Disp:     Diet Diet NPO, After Midnight Exceptions are: Ice Chips, Sips of Water with Meds  DIET GENERAL;   DVT Prophylaxis [] Lovenox, []  Heparin, [] SCDs, [] Ambulation   GI Prophylaxis [] PPI,  [] H2 Blocker,  [] Carafate,  [] Diet/Tube Feeds   Code Status Full Code   Disposition Patient requires continued admission due to abdominal pain/cellulitis   CMS Level of Risk [] Low, [x] Moderate,[]  High  Patient's risk as above due to abdominal pain/cellulitis     RONNELL MONTILLA MD 3/6/2021 10:01 AM

## 2021-03-06 NOTE — PROGRESS NOTES
Mary was called on patient because she was having a seizure like activity. Pt with eyes closed , arms and feet clinched, and moving her head and waist repeatedly and breathing fast. Concerning for pseudoseizures. Initial trial with ammonia pt turned her head once then was mouth breathing, gave ativan 2 mg IV once and this movement stopped. Her BG was 110, her oxygen sat 100 %. Reviewing pt's records including last admission and [facility] found pt to be listed as having pseudoseizures.  Will place pt on seizure precautions and consult neurology

## 2021-03-07 ENCOUNTER — ANESTHESIA EVENT (OUTPATIENT)
Dept: OPERATING ROOM | Age: 51
DRG: 603 | End: 2021-03-07
Payer: COMMERCIAL

## 2021-03-07 LAB
1,3 BETA-D-GLUCAN INTERP: ABNORMAL
1,3 BETA-D-GLUCAN: 76 PG/ML
DOSE AMOUNT: ABNORMAL
DOSE TIME: ABNORMAL
VANCOMYCIN TROUGH: 23.4 UG/ML (ref 10–20)

## 2021-03-07 PROCEDURE — 6360000002 HC RX W HCPCS: Performed by: INTERNAL MEDICINE

## 2021-03-07 PROCEDURE — 1200000000 HC SEMI PRIVATE

## 2021-03-07 PROCEDURE — 99223 1ST HOSP IP/OBS HIGH 75: CPT | Performed by: NURSE PRACTITIONER

## 2021-03-07 PROCEDURE — 6360000002 HC RX W HCPCS: Performed by: HOSPITALIST

## 2021-03-07 PROCEDURE — 6370000000 HC RX 637 (ALT 250 FOR IP): Performed by: PHYSICIAN ASSISTANT

## 2021-03-07 PROCEDURE — 80202 ASSAY OF VANCOMYCIN: CPT

## 2021-03-07 PROCEDURE — 2580000003 HC RX 258: Performed by: INTERNAL MEDICINE

## 2021-03-07 PROCEDURE — 94761 N-INVAS EAR/PLS OXIMETRY MLT: CPT

## 2021-03-07 PROCEDURE — 6370000000 HC RX 637 (ALT 250 FOR IP): Performed by: HOSPITALIST

## 2021-03-07 PROCEDURE — 6370000000 HC RX 637 (ALT 250 FOR IP): Performed by: INTERNAL MEDICINE

## 2021-03-07 RX ORDER — OXYCODONE HYDROCHLORIDE AND ACETAMINOPHEN 5; 325 MG/1; MG/1
1 TABLET ORAL EVERY 4 HOURS PRN
Status: DISCONTINUED | OUTPATIENT
Start: 2021-03-07 | End: 2021-03-08 | Stop reason: HOSPADM

## 2021-03-07 RX ADMIN — LORAZEPAM 2 MG: 2 INJECTION INTRAMUSCULAR; INTRAVENOUS at 09:48

## 2021-03-07 RX ADMIN — ONDANSETRON 4 MG: 2 INJECTION INTRAMUSCULAR; INTRAVENOUS at 00:53

## 2021-03-07 RX ADMIN — VANCOMYCIN HYDROCHLORIDE 1000 MG: 1 INJECTION, POWDER, LYOPHILIZED, FOR SOLUTION INTRAVENOUS at 21:35

## 2021-03-07 RX ADMIN — FERROUS SULFATE TAB 325 MG (65 MG ELEMENTAL FE) 325 MG: 325 (65 FE) TAB at 09:10

## 2021-03-07 RX ADMIN — GABAPENTIN 800 MG: 400 CAPSULE ORAL at 21:33

## 2021-03-07 RX ADMIN — OXYCODONE HYDROCHLORIDE AND ACETAMINOPHEN 1 TABLET: 5; 325 TABLET ORAL at 03:01

## 2021-03-07 RX ADMIN — PROMETHAZINE HYDROCHLORIDE 25 MG: 25 TABLET ORAL at 05:35

## 2021-03-07 RX ADMIN — HYDROXYZINE PAMOATE 50 MG: 25 CAPSULE ORAL at 09:13

## 2021-03-07 RX ADMIN — MORPHINE SULFATE 2 MG: 2 INJECTION, SOLUTION INTRAMUSCULAR; INTRAVENOUS at 05:28

## 2021-03-07 RX ADMIN — HYDROXYZINE PAMOATE 50 MG: 25 CAPSULE ORAL at 17:11

## 2021-03-07 RX ADMIN — MORPHINE SULFATE 2 MG: 2 INJECTION, SOLUTION INTRAMUSCULAR; INTRAVENOUS at 09:48

## 2021-03-07 RX ADMIN — MORPHINE SULFATE 2 MG: 2 INJECTION, SOLUTION INTRAMUSCULAR; INTRAVENOUS at 00:54

## 2021-03-07 RX ADMIN — GABAPENTIN 800 MG: 400 CAPSULE ORAL at 13:16

## 2021-03-07 RX ADMIN — OXYCODONE HYDROCHLORIDE AND ACETAMINOPHEN 1 TABLET: 5; 325 TABLET ORAL at 23:46

## 2021-03-07 RX ADMIN — PROMETHAZINE HYDROCHLORIDE 25 MG: 25 TABLET ORAL at 21:34

## 2021-03-07 RX ADMIN — ONDANSETRON 4 MG: 2 INJECTION INTRAMUSCULAR; INTRAVENOUS at 09:48

## 2021-03-07 RX ADMIN — OXYCODONE HYDROCHLORIDE AND ACETAMINOPHEN 1 TABLET: 5; 325 TABLET ORAL at 13:16

## 2021-03-07 RX ADMIN — LEVOTHYROXINE SODIUM 125 MCG: 125 TABLET ORAL at 21:34

## 2021-03-07 RX ADMIN — LEVETIRACETAM 1000 MG: 500 TABLET, FILM COATED ORAL at 21:33

## 2021-03-07 RX ADMIN — MORPHINE SULFATE 2 MG: 2 INJECTION, SOLUTION INTRAMUSCULAR; INTRAVENOUS at 21:34

## 2021-03-07 RX ADMIN — ENOXAPARIN SODIUM 40 MG: 40 INJECTION SUBCUTANEOUS at 09:10

## 2021-03-07 RX ADMIN — ONDANSETRON 4 MG: 2 INJECTION INTRAMUSCULAR; INTRAVENOUS at 23:46

## 2021-03-07 RX ADMIN — TIZANIDINE 4 MG: 4 TABLET ORAL at 09:13

## 2021-03-07 RX ADMIN — LORAZEPAM 2 MG: 2 INJECTION INTRAMUSCULAR; INTRAVENOUS at 21:34

## 2021-03-07 RX ADMIN — ONDANSETRON 4 MG: 4 TABLET, ORALLY DISINTEGRATING ORAL at 17:11

## 2021-03-07 RX ADMIN — VANCOMYCIN HYDROCHLORIDE 1000 MG: 1 INJECTION, POWDER, LYOPHILIZED, FOR SOLUTION INTRAVENOUS at 06:30

## 2021-03-07 RX ADMIN — GABAPENTIN 800 MG: 400 CAPSULE ORAL at 09:10

## 2021-03-07 RX ADMIN — ESTRADIOL 0.5 MG: 1 TABLET ORAL at 09:13

## 2021-03-07 RX ADMIN — SODIUM CHLORIDE, PRESERVATIVE FREE 10 ML: 5 INJECTION INTRAVENOUS at 21:35

## 2021-03-07 RX ADMIN — PAROXETINE 50 MG: 10 TABLET, FILM COATED ORAL at 21:33

## 2021-03-07 RX ADMIN — HYDROXYZINE PAMOATE 50 MG: 25 CAPSULE ORAL at 23:46

## 2021-03-07 RX ADMIN — TIZANIDINE 4 MG: 4 TABLET ORAL at 21:34

## 2021-03-07 RX ADMIN — ATENOLOL 25 MG: 25 TABLET ORAL at 09:10

## 2021-03-07 RX ADMIN — HYDROXYZINE PAMOATE 50 MG: 25 CAPSULE ORAL at 00:52

## 2021-03-07 RX ADMIN — DICYCLOMINE HYDROCHLORIDE 10 MG: 10 CAPSULE ORAL at 23:46

## 2021-03-07 RX ADMIN — PROMETHAZINE HYDROCHLORIDE 25 MG: 25 TABLET ORAL at 13:16

## 2021-03-07 RX ADMIN — MORPHINE SULFATE 2 MG: 2 INJECTION, SOLUTION INTRAMUSCULAR; INTRAVENOUS at 17:10

## 2021-03-07 RX ADMIN — LEVETIRACETAM 1000 MG: 500 TABLET, FILM COATED ORAL at 09:10

## 2021-03-07 RX ADMIN — SODIUM CHLORIDE, PRESERVATIVE FREE 10 ML: 5 INJECTION INTRAVENOUS at 09:11

## 2021-03-07 ASSESSMENT — PAIN DESCRIPTION - ONSET
ONSET: ON-GOING
ONSET: ON-GOING

## 2021-03-07 ASSESSMENT — PAIN DESCRIPTION - DESCRIPTORS
DESCRIPTORS: ACHING
DESCRIPTORS: CONSTANT;CRAMPING
DESCRIPTORS: SORE

## 2021-03-07 ASSESSMENT — PAIN SCALES - GENERAL
PAINLEVEL_OUTOF10: 7
PAINLEVEL_OUTOF10: 8
PAINLEVEL_OUTOF10: 7
PAINLEVEL_OUTOF10: 9
PAINLEVEL_OUTOF10: 6
PAINLEVEL_OUTOF10: 8

## 2021-03-07 ASSESSMENT — COPD QUESTIONNAIRES: CAT_SEVERITY: MODERATE

## 2021-03-07 ASSESSMENT — PAIN DESCRIPTION - PAIN TYPE
TYPE: ACUTE PAIN

## 2021-03-07 ASSESSMENT — LIFESTYLE VARIABLES: SMOKING_STATUS: 0

## 2021-03-07 ASSESSMENT — PAIN DESCRIPTION - LOCATION
LOCATION: ABDOMEN;HEAD
LOCATION: ABDOMEN
LOCATION: ABDOMEN

## 2021-03-07 ASSESSMENT — PAIN - FUNCTIONAL ASSESSMENT
PAIN_FUNCTIONAL_ASSESSMENT: ACTIVITIES ARE NOT PREVENTED

## 2021-03-07 ASSESSMENT — PAIN DESCRIPTION - FREQUENCY
FREQUENCY: CONTINUOUS

## 2021-03-07 ASSESSMENT — ENCOUNTER SYMPTOMS: SHORTNESS OF BREATH: 1

## 2021-03-07 ASSESSMENT — PAIN DESCRIPTION - PROGRESSION: CLINICAL_PROGRESSION: NOT CHANGED

## 2021-03-07 ASSESSMENT — PAIN DESCRIPTION - ORIENTATION: ORIENTATION: MID

## 2021-03-07 ASSESSMENT — PAIN DESCRIPTION - DIRECTION: RADIATING_TOWARDS: RT SIDE

## 2021-03-07 NOTE — ANESTHESIA PRE PROCEDURE
Department of Anesthesiology  Preprocedure Note       Name:  Sendy Dietrich   Age:  44 y.o.  :  1970                                          MRN:  [id number]         Date:  3/7/2021      Surgeon: Ever Cheng):  Tay Shea MD    Procedure: Procedure(s):  LEFT THIGH LESION BIOPSY EXCISION    Medications prior to admission:   Prior to Admission medications    Medication Sig Start Date End Date Taking? Authorizing Provider   hydrOXYzine (VISTARIL) 50 MG capsule Take 1 capsule by mouth every 6 hours as needed for Anxiety 21  Yes Torrey Brady MD   promethazine (PHENERGAN) 25 MG tablet Take 1 tablet by mouth every 8 hours as needed for Nausea 21  Yes Tay Shea MD   ferrous sulfate (IRON 325) 325 (65 Fe) MG tablet Take 1 tablet by mouth daily (with breakfast) 10/23/20  Yes Tay Shea MD   levETIRAcetam (KEPPRA) 1000 MG tablet Take 1 tablet by mouth 2 times daily 20  Yes Los Hunt MD   dicyclomine (BENTYL) 10 MG capsule Take 1 capsule by mouth 3 times daily as needed (abdominal pain) 20  Yes Bruno Giron PA-C   oxyCODONE-acetaminophen (PERCOCET) 5-325 MG per tablet Take 1 tablet by mouth.  Every 4-6 hours PRN   Yes Historical Provider, MD   Cholecalciferol (VITAMIN D3) 5000 units TABS Take 1 tablet by mouth daily   Yes Historical Provider, MD   estradiol (ESTRACE) 0.5 MG tablet Take 0.5 mg by mouth daily   Yes Historical Provider, MD   atenolol (TENORMIN) 25 MG tablet Take 25 mg by mouth daily   Yes Historical Provider, MD   Multiple Vitamin (MVI, BARIATRIC ADVANTAGE MULTI-FORMULA, CHEW TAB) Take 1 tablet by mouth daily 19  Yes Tay Shea MD   Calcium Citrate-Vitamin D (CALCIUM + VIT D, BARIATRIC ADVANTAGE, CHEWABLE TABLET) Take 1 tablet by mouth 2 times daily 19  Yes Tay Shea MD   levothyroxine (SYNTHROID) 125 MCG tablet Take 1 tablet by mouth Daily  Patient taking differently: Take 125 mcg by mouth nightly  18  Yes Josh Jaren Don MD   gabapentin (NEURONTIN) 800 MG tablet Take 800 mg by mouth 3 times daily   Yes Historical Provider, MD   PARoxetine (PAXIL) 30 MG tablet Take 50 mg by mouth nightly    Yes Historical Provider, MD   albuterol (PROVENTIL) (2.5 MG/3ML) 0.083% nebulizer solution Take 3 mLs by nebulization every 6 hours as needed for Wheezing 2/18/21   Cherie Reyna MD   albuterol sulfate  (90 Base) MCG/ACT inhaler Inhale 2 puffs into the lungs 4 times daily 2/8/21   Cherie Reyna MD       Current medications:    Current Facility-Administered Medications   Medication Dose Route Frequency Provider Last Rate Last Admin    oxyCODONE-acetaminophen (PERCOCET) 5-325 MG per tablet 1 tablet  1 tablet Oral Q4H PRN Krishna Shah MD   1 tablet at 03/07/21 1316    vancomycin 1000 mg IVPB in 250 mL D5W addavial  1,000 mg Intravenous Q12H Ricardo Agustin Reich MD        LORazepam (ATIVAN) injection 2 mg  2 mg Intravenous Q6H PRN Daniele Thurman MD   2 mg at 03/07/21 0948    albuterol (PROVENTIL) nebulizer solution 2.5 mg  2.5 mg Nebulization Q6H PRN Ricardopaulino Reich MD        atenolol (TENORMIN) tablet 25 mg  25 mg Oral Daily Ricardopaulino SOOD MD   25 mg at 03/07/21 0910    dicyclomine (BENTYL) capsule 10 mg  10 mg Oral TID PRN Ricardopaulino Reich MD        estradiol (ESTRACE) tablet 0.5 mg  0.5 mg Oral Daily Ricardopaulino SOOD MD   0.5 mg at 03/07/21 0913    ferrous sulfate (IRON 325) tablet 325 mg  325 mg Oral Daily with breakfast Linda SOOD MD   325 mg at 03/07/21 0910    hydrOXYzine (VISTARIL) capsule 50 mg  50 mg Oral Q6H PRN Ricardo Agustin Reich MD   50 mg at 03/07/21 1711    levETIRAcetam (KEPPRA) tablet 1,000 mg  1,000 mg Oral BID Linda SOOD MD   1,000 mg at 03/07/21 0910    levothyroxine (SYNTHROID) tablet 125 mcg  125 mcg Oral Nightly Ricardo Reich MD   125 mcg at 03/06/21 2032    PARoxetine (PAXIL) tablet 50 mg  50 mg Oral Nightly Ricardo Reich MD   50 mg at 03/06/21 2031    promethazine (PHENERGAN) tablet 25 mg  25 mg Oral Q8H PRN Ricardo Kurtis Witt MD   25 mg at 03/07/21 1316    sodium chloride flush 0.9 % injection 10 mL  10 mL Intravenous 2 times per day Benny Dickinson MD   10 mL at 03/07/21 0911    sodium chloride flush 0.9 % injection 10 mL  10 mL Intravenous PRN Ricardo Kurtis Witt MD        enoxaparin (LOVENOX) injection 40 mg  40 mg Subcutaneous Daily Jose SOOD MD   40 mg at 03/07/21 0910    magnesium hydroxide (MILK OF MAGNESIA) 400 MG/5ML suspension 30 mL  30 mL Oral Daily PRN Ricardo Kurtis Witt MD        acetaminophen (TYLENOL) tablet 650 mg  650 mg Oral Q6H PRN Ricardo Kurtis Witt MD        Or    acetaminophen (TYLENOL) suppository 650 mg  650 mg Rectal Q6H PRN Ricardo Kurtis Witt MD        ondansetron (ZOFRAN-ODT) disintegrating tablet 4 mg  4 mg Oral Q8H PRN Ricardo Kurtis Witt MD   4 mg at 03/07/21 1711    Or    ondansetron (ZOFRAN) injection 4 mg  4 mg Intravenous Q6H PRN Ricardo Johnnie SOOD MD   4 mg at 03/07/21 0948    gabapentin (NEURONTIN) capsule 800 mg  800 mg Oral TID Martha López PA-C   800 mg at 03/07/21 1316    morphine (PF) injection 2 mg  2 mg Intravenous Q4H PRN Ricardo Johnnie SOOD MD   2 mg at 03/07/21 1710    tiZANidine (ZANAFLEX) tablet 4 mg  4 mg Oral BID PRN Martha López PA-C   4 mg at 03/07/21 0913       Allergies:     Allergies   Allergen Reactions    Aspirin Palpitations     \"My Heart Rate Elevates\"    Shellfish-Derived Products Swelling    Toradol [Ketorolac Tromethamine] Rash    Compazine [Prochlorperazine]     Reglan [Metoclopramide] Itching       Problem List:    Patient Active Problem List   Diagnosis Code    Fibromyalgia M79.7    Lupus (systemic lupus erythematosus) (HCC) M32.9    Chronic pancreatitis (HCC) K86.1    HTN (hypertension) I10    Generalized abdominal pain R10.84    Frequent UTI N39.0    Gastroesophageal reflux disease without esophagitis K21.9    Depression F32.9    Fatty liver disease, nonalcoholic N22.1    Arthritis M19.90    Bilateral low back pain with left-sided sciatica M54.42    Intractable vomiting with nausea R11.2    Hiatal hernia K44.9    Status post laparoscopic sleeve gastrectomy Z98.84    Pseudoseizure F44.5    Chronic pain syndrome G89.4    Drug-seeking/Aberrant behavior Z76.5    Lymph node disorder I89.9    Morbid obesity with BMI of 40.0-44.9, adult ([facility]) E66.01, Z68.41    Iron deficiency anemia secondary to blood loss (chronic) D50.0    Encounter for weight management Z76.89    Intractable nausea and vomiting R11.2    Seizure ([facility]) R56.9    Abdominal pain R10.9    Anxiety F41.9    RUQ pain R10.11    Intractable vomiting R11.10    Status post bariatric surgery Z98.84    Morbid obesity with BMI of 45.0-49.9, adult ([facility]) E66.01, Z68.42    Discoloration of skin of flank resembling ecchymosis L81.9    Morbid obesity with BMI of 50.0-59.9, adult ([facility]) E66.01, Z68.43    Chest pain of uncertain etiology E20.8    Cellulitis of left thigh L03.116       Past Medical History:        Diagnosis Date    Acid reflux     Anemia     Anxiety     Arthritis     Hands, Back And Ankles    Bleeding ulcer 2014    \"I Had Ulcers In My Stomach And Colon\"/ per pt on 8/12/2019\"they said recently having some blood in my stomach- in July ( 2019)could not find where coming from \"    Bronchitis Last Episode 2014    Chronic back pain     [facility]     [facility]    COPD (chronic obstructive pulmonary disease) ([facility])     Sees Dr. Augusta Whiting Depression     Fibromyalgia Dx 2013    GERD (gastroesophageal reflux disease)     H/O echocardiogram 08/11/2015    EF >55%. LA to be at the upper limit of normal in size. LV hypertrophy with normal LV systolic, but abnormal diastolic function. Normal valvular structures and function.      H/O echocardiogram 08/30/2018    EF 55-60%    Hiatal hernia     History of blood transfusion 09/2015 And 2018    No Reaction To Blood Transfusions Received    History of sleeve gastrectomy     [place] (hard of hearing)     Right Ear    Hx of cardiac catheterization 10/24/2010    Angiographically normal coronary arteries w/ normal LV function and wall motion.  Hx of transesophageal echocardiography (PILO) for monitoring 12/02/2010    EF 55-60%. WNL.    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 name   Mariely Knowles cysts     \"they found that I have a kidney cyst but not sure which side\" per pt on 7/15/2020    Lupus ([address] Dx 2013    \"Rheumatoid Lupus\"    MDRO (multiple drug resistant organisms) resistance     4 months ago chest from mediport    Morbid obesity ([address]     MRSA bacteremia     Nausea     \"Most Of The Time\"    Osteomyelitis of fifth toe of left foot ([address]     Pain management     Sees Dr. Margaret Cam, Once A Month    Pancreatitis chronic Dx 2001    Pneumonia Dx 11-15    Seizures ([address]     Shortness of breath on exertion     Shortness of breath on exertion     Sleep apnea     Has CPAP\"no longer use the cpap since lost weight\"    Staph infection Dx 11-15    Left Foot    Staph infection Dx 11-15    \"Left Foot\"    Teeth missing     Upper And Lower    Thyroid disease     Wears glasses     To Read       Past Surgical History:        Procedure Laterality Date    APPENDECTOMY  02/1998    Done When Tubes And Ovaries Were Removed    CARDIAC CATHETERIZATION  10/24/2010    CATHETER REMOVAL N/A 7/16/2019    PORT REMOVAL performed by Jenna Coughlin MD at [facility]  08/27/1991    CHOLECYSTECTOMY, LAPAROSCOPIC  1990's    COLONOSCOPY  Last Done In 2000's    DENTAL SURGERY      Teeth Extracted In Past    ENDOSCOPY, COLON, DIAGNOSTIC  Last Done In 2018    FOOT DEBRIDEMENT Left 6/16/2019    FIRST METATARSAL DEBRIDEMENT INCISION AND DRAINAGE. EXCISION OF ULCER.  RESECTION OF BONE. 1ST METATARSAL POWER LAVAGE AND BONE CEMENT performed by Chana Foley DPM at [place] [address] Left Last Done In 2016     Dr. Rosette Menendez, \" About 6 Surgeries Done Because Of Staph Infection\"    HIATAL HERNIA REPAIR N/A 2/12/2019    HERNIA HIATAL LAPAROSCOPIC ROBOTIC performed by Rick Casey MD at [facility]  10/1997    Partial Abdominal Hysterectomy    INSERTION / REMOVAL / REPLACEMENT VENOUS ACCESS CATHETER N/A 8/15/2019    PORT INSERTION performed by Rick Casey MD at [facility]    removed after 6 months    LUNG REMOVAL, PARTIAL Left 2008    Benign    OTHER SURGICAL HISTORY  02/1998    \"Tubes And Ovaries Removed, Appendectomy Also Done\"    OTHER SURGICAL HISTORY  Last Done 7-15-16    Mediport Insertion \"Total Of Six Done, Removed Last Mediport In 2014\"    PORT SURGERY N/A 1/15/2020    PORT REMOVAL performed by Rick Casey MD at [place] [address] N/A 7/6/2020    PORT INSERTION performed by Rick Casey MD at [address] [place] [address] N/A 2/12/2019    GASTRECTOMY SLEEVE LAPAROSCOPIC ROBOTIC performed by Rick Casey MD at [address] [place] [address]  08/27/2018    UPPER GASTROINTESTINAL ENDOSCOPY N/A 4/2/2019    EGD CONTROL HEMORRHAGE with epi injection at bleeding site performed by Rick aCsey MD at [facility]  6/19/2019    EGD DIAGNOSTIC ONLY performed by Rick Casey MD at [facility] N/A 7/22/2019    EGD DIAGNOSTIC ONLY performed by Shepard Felty, MD at [facility] 10/14/2019    EGD DIAGNOSTIC ONLY performed by Rick Casey MD at [facility] N/A 7/17/2020    EGJ DILATATION BALLOON WITH 18-20 MM BALLOON, DILATED TO 20 MM. performed by Ismael Holliday MD at [facility] ENDOSCOPY       Social History:    Social History     Tobacco Use    Smoking status: Never Smoker    Smokeless tobacco: Never Used   Substance Use Topics    Alcohol use: No     Alcohol/week: 0.0 standard drinks                                Counseling given: Not Answered      Vital Signs (Current):   Vitals:    03/07/21 0914 03/07/21 1120 03/07/21 1317 03/07/21 1516   BP: 131/85  96/61 103/75   Pulse: 58  59 55   Resp: 18 16 16 19   Temp: 36.7 °C (98.1 °F)  36.6 °C (97.8 °F)    TempSrc: Oral  Oral    SpO2: 95% 96% 99%    Weight:       Height:                                                  BP Readings from Last 3 Encounters:   03/07/21 103/75   02/17/21 124/78   02/13/21 (!) 183/86       NPO Status:                                                                                 BMI:   Wt Readings from Last 3 Encounters:   03/07/21 285 lb (129.3 kg)   02/17/21 272 lb 12.8 oz (123.7 kg)   02/11/21 278 lb 1.6 oz (126.1 kg)     Body mass index is 48.54 kg/m².     CBC:   Lab Results   Component Value Date    WBC 3.4 03/05/2021    RBC 4.08 03/05/2021    HGB 10.3 03/05/2021    HCT 34.1 03/05/2021    MCV 83.6 03/05/2021    RDW 14.2 03/05/2021     03/05/2021       CMP:   Lab Results   Component Value Date     03/06/2021    K 4.6 03/06/2021     03/06/2021    CO2 30 03/06/2021    BUN 12 03/06/2021    CREATININE 0.9 03/06/2021    GFRAA >60 03/06/2021    LABGLOM >60 03/06/2021    GLUCOSE 79 03/06/2021    PROT 6.3 03/05/2021    PROT 6.5 11/18/2011    CALCIUM 9.5 03/06/2021    BILITOT 0.2 03/05/2021    ALKPHOS 95 03/05/2021    AST 17 03/05/2021    ALT 16 03/05/2021       POC Tests:   Recent Labs     03/06/21  1812   POCGLU 102*       Coags:   Lab Results   Component Value Date    PROTIME 10.4 02/12/2021    PROTIME 11.8 01/25/2021    INR 0.86 02/12/2021    APTT 30.4 02/12/2021       HCG (If Applicable):   Lab Results   Component Value Date    PREGTESTUR NEGATIVE 07/10/2017        ABGs: No results found for: PHART, PO2ART, ODW1CNY, VYO3GYZ, BEART, V3AHQMKC     Type & Screen (If Applicable):  No results found for: LABABO, LABRH    Drug/Infectious Status (If Applicable):  Lab Results   Component Value Date    HEPCAB 0.18 11/18/2015       COVID-19 Screening (If Applicable):   Lab Results   Component Value Date    COVID19 NOT DETECTED 03/03/2021    COVID19 THROAT 02/09/2021         Anesthesia Evaluation  Patient summary reviewed and Nursing notes reviewed no history of anesthetic complications:   Airway: Mallampati: II  TM distance: >3 FB   Neck ROM: full  Mouth opening: > = 3 FB Dental:          Pulmonary:normal exam    (+) pneumonia: resolved,  COPD: moderate,  shortness of breath: chronic,  sleep apnea: on noncompliant,      (-) not a current smoker                           Cardiovascular:    (+) hypertension:, PARSONS:,          Beta Blocker:  Not on Beta Blocker      ROS comment: ECG:  Normal sinus rhythm   Normal ECG   When compared with ECG of 10-FEB-2021 11:05,   No significant change was found   Confirmed by OPAL Ann ([id number]) on 3/4/2021 5:38:38 PM    STRESS TEST:   Stress Interpretation      normal stress echo, no regional wall motion abnormalities noted. normal   LVEF. The patient exercised according to the DOBUTAMINE for 12:41 min:s, achieving   a work level of Max. METS: 1.00. The resting heart rate of 82 bpm min to a maximal heart rate of   153 bpm. This value represents   91 % of the maximal, age-predicted heart rate. The resting blood pressure of   179/70 mmHg , min to a   maximum blood pressure of 196/80 mmHg. The exercise test was stopped due to   [address]    ECHO:  Summary   Left ventricular systolic function is normal.   Ejection fraction is visually estimated at 55%. Aneurysmal interatrial septum with positive bubble study.    No evidence of thrombus within the left atrial appendage. Moderate to severe tricuspid regurgitation is present. No evidence of any pericardial effusion. No evidence of endocarditis. Signature      ------------------------------------------------------------------   Electronically signed by Samir Chavez MD   (Interpreting physician) on 01/16/2020 at 05:53 PM   ------------------------------------------------------------------     Neuro/Psych:   (+) seizures:, neuromuscular disease ( Fibromyalgia):, psychiatric history ( Pseudoseizure):depression/anxiety              ROS comment: Drug-seeking/Aberrant behavior GI/Hepatic/Renal:   (+) hiatal hernia, GERD:, PUD, liver disease ( Fatty liver disease, nonalcoholic):, morbid obesity         ROS comment: Pancreatitis chronic. Endo/Other:    (+) hypothyroidism: arthritis: OA., .                  ROS comment: Lupus ([facility] \"Rheumatoid Lupus\"   Osteomyelitis of fifth toe of left foot (HCC) Abdominal:           Vascular:                                      Anesthesia Plan      general     ASA 4     (COVID negative 03/03/2021)  Induction: intravenous. MIPS: Postoperative opioids intended. Anesthetic plan and risks discussed with patient. Plan discussed with CRNA.     Attending anesthesiologist reviewed and agrees with Pre Eval content            MICHAEL Flores - CRNA   3/7/2021

## 2021-03-07 NOTE — CONSULTS
Neurology Service Consult Note  [unfilled]   Patient Name: Gerard Morales  : 1970        Subjective:   Reason for consult: \"My pain got bad again yesterday a seizure\"  44 y.o. right-handed female with extensive PMH listed below presenting to [facility] with complaints of left leg boil and with, pain. Patient reports she went to see her pain doctor and when she showed the boil to her she said go to the ER immediately as it needed treatment. Patient continues to have her nausea and chronic GI issues that GI and general surgery are working up. She states yesterday that she was very tense and her pain was building up and she knew she was going to have a seizure, and when nursing staff was in the room around 1800, she started hyperventilating, eyes rolled into the back of her head, shaking, responded and turned towards ammonia salts. Patient has this reaction every time her pain hits a certain limit. She has been treated many times by my group in the past  I have never been able to prove true epileptic seizure however patient prefers to be on Keppra 1000mg BID. She did not bite her tongue or urinate on herself. She has no CP and SOB during my exam  No fever, neck or back pain  NO s/s of meningitis       Past Medical History:   Diagnosis Date    Acid reflux     Anemia     Anxiety     Arthritis     Hands, Back And Ankles    Bleeding ulcer     \"I Had Ulcers In My Stomach And Colon\"/ per pt on 2019\"they said recently having some blood in my stomach- in July ( )could not find where coming from \"    Bronchitis Last Episode     Chronic back pain     Chronic [facility]     [facility]    COPD (chronic obstructive pulmonary disease) ([facility] [address] [address]     Sees Dr. Matthew Reed Depression     Fibromyalgia Dx     GERD (gastroesophageal reflux disease)     H/O echocardiogram 2015    EF >55%. LA to be at the upper limit of normal in size.  LV hypertrophy with normal LV systolic, but abnormal diastolic function. Normal valvular structures and function.  H/O echocardiogram 08/30/2018    EF 55-60%    Hiatal hernia     History of blood transfusion 09/2015 And 2018    No Reaction To Blood Transfusions Received    History of sleeve gastrectomy     [place] (hard of hearing)     Right Ear    Hx of cardiac catheterization 10/24/2010    Angiographically normal coronary arteries w/ normal LV function and wall motion.  Hx of transesophageal echocardiography (PILO) for monitoring 12/02/2010    EF 55-60%. WNL.    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 name   [facility] cysts     \"they found that I have a kidney cyst but not sure which side\" per pt on 7/15/2020    Lupus ([address] Dx 2013    \"Rheumatoid Lupus\"    MDRO (multiple drug resistant organisms) resistance     4 months ago chest from mediport    Morbid obesity ([address]     MRSA bacteremia     Nausea     \"Most Of The Time\"    Osteomyelitis of fifth toe of left foot ([address]     Pain management     Sees Dr. Jeffrey Bolanos, Once A Month    Pancreatitis chronic Dx 2001    Pneumonia Dx 11-15    Seizures ([address]     Shortness of breath on exertion     Shortness of breath on exertion     Sleep apnea     Has CPAP\"no longer use the cpap since lost weight\"    Staph infection Dx 11-15    Left Foot    Staph infection Dx 11-15    \"Left Foot\"    Teeth missing     Upper And Lower    Thyroid disease     Wears glasses     To Read    :   Past Surgical History:   Procedure Laterality Date    APPENDECTOMY  02/1998    Done When Tubes And Ovaries Were Removed    CARDIAC CATHETERIZATION  10/24/2010    CATHETER REMOVAL N/A 7/16/2019    PORT REMOVAL performed by Kori Cárdenas MD at [address] [facility]  08/27/1991    CHOLECYSTECTOMY, LAPAROSCOPIC  1990's    COLONOSCOPY  Last Done In 2000's   [facility] DENTAL SURGERY      Teeth Extracted In Past    ENDOSCOPY, COLON, DIAGNOSTIC  Last Done In 2018    FOOT DEBRIDEMENT Left 6/16/2019    FIRST METATARSAL DEBRIDEMENT INCISION AND DRAINAGE. EXCISION OF ULCER.  RESECTION OF BONE. 1ST METATARSAL POWER LAVAGE AND BONE CEMENT performed by Serafin Capone DPM at [address] Left Last Done In 2016     Dr. Kwame Banks, \" About 6 Surgeries Done Because Of Staph Infection\"    HIATAL HERNIA REPAIR N/A 2/12/2019    HERNIA HIATAL LAPAROSCOPIC ROBOTIC performed by Kori Cárdenas MD at [address] [facility] [address]  10/1997    Partial Abdominal Hysterectomy    INSERTION / REMOVAL / REPLACEMENT VENOUS ACCESS CATHETER N/A 8/15/2019    PORT INSERTION performed by Kori Cárdenas MD at [address] [facility]    removed after 6 months    LUNG REMOVAL, PARTIAL Left 2008    Benign    OTHER SURGICAL HISTORY  02/1998    \"Tubes And Ovaries Removed, Appendectomy Also Done\"    OTHER SURGICAL HISTORY  Last Done 7-15-16    Mediport Insertion \"Total Of Six Done, Removed Last Mediport In 2014\"    PORT SURGERY N/A 1/15/2020    PORT REMOVAL performed by Kori Cárdenas MD at [address] N/A 7/6/2020    PORT INSERTION performed by Kori Cárdenas MD at [facility] N/A 2/12/2019    GASTRECTOMY SLEEVE LAPAROSCOPIC ROBOTIC performed by Kori Cárdenas MD at [address] [place] [address]  08/27/2018    UPPER GASTROINTESTINAL ENDOSCOPY N/A 4/2/2019    EGD CONTROL HEMORRHAGE with epi injection at bleeding site performed by Kori Cárdenas MD at [place] [address] 6/19/2019    EGD DIAGNOSTIC ONLY performed by Kori Cárdenas MD at [place] [address] 7/22/2019    EGD DIAGNOSTIC ONLY performed by Mian Greenfield MD at [address] [place] [address] N/A 10/14/2019    EGD DIAGNOSTIC ONLY performed by Philip Saeed MD at [address] [place] [address] N/A 7/17/2020    EGJ DILATATION BALLOON WITH 18-20 MM BALLOON, DILATED TO 20 MM. performed by Philip Saeed MD at [facility] ENDOSCOPY     Medications:  Scheduled Meds:   atenolol  25 mg Oral Daily    estradiol  0.5 mg Oral Daily    ferrous sulfate  325 mg Oral Daily with breakfast    levETIRAcetam  1,000 mg Oral BID    levothyroxine  125 mcg Oral Nightly    PARoxetine  50 mg Oral Nightly    sodium chloride flush  10 mL Intravenous 2 times per day    enoxaparin  40 mg Subcutaneous Daily    gabapentin  800 mg Oral TID    vancomycin  1,000 mg Intravenous Q8H     Continuous Infusions:  PRN Meds:.oxyCODONE-acetaminophen, LORazepam, albuterol, dicyclomine, hydrOXYzine, promethazine, sodium chloride flush, magnesium hydroxide, acetaminophen **OR** acetaminophen, ondansetron **OR** ondansetron, morphine, tiZANidine    Allergies   Allergen Reactions    Aspirin Palpitations     \"My Heart Rate Elevates\"    Shellfish-Derived Products Swelling    Toradol [Ketorolac Tromethamine] Rash    Compazine [Prochlorperazine]     Reglan [Metoclopramide] Itching     Social History     Socioeconomic History    Marital status:      Spouse name: Not on file    Number of children: Not on file    Years of education: Not on file    Highest education level: Not on file   Occupational History    Not on file   Social Needs    Financial resource strain: Not on file    Food insecurity     Worry: Not on file     Inability: Not on file    Transportation needs     Medical: Not on file     Non-medical: Not on file   Tobacco Use    Smoking status: Never Smoker    Smokeless tobacco: Never Used   Substance and Sexual Activity    Alcohol use: No     Alcohol/week: 0.0 standard drinks    Drug use: No    Sexual activity: Not Currently   Lifestyle    Physical activity     Days per week: Not on file     Minutes per session: Not on file    Stress: Not on file   Relationships    Social connections     Talks on phone: Not on file     Gets together: Not on file     Attends Protestant service: Not on file     Active member of club or organization: Not on file     Attends meetings of clubs or organizations: Not on file     Relationship status: Not on file    Intimate partner violence     Fear of current or ex partner: Not on file     Emotionally abused: Not on file     Physically abused: Not on file     Forced sexual activity: Not on file   Other Topics Concern    Not on file   Social History Narrative    Not on file      Family History   Problem Relation Age of Onset    Diabetes Mother         \"Borderline Diabetes\"    High Blood Pressure Mother     Obesity Mother     Arthritis Mother     Heart Disease Mother     High Cholesterol Mother     Vision Loss Mother     Diabetes Father     High Blood Pressure Father     Asthma Father     Cancer Father         prostate cancer    High Blood Pressure Brother     Asthma Son     Vision Loss Son     Lupus Daughter     Other Daughter         \"Alot Of Female Problems\"       Review of Symptoms:    14-point system review completed. All of which are negative except as mentioned above. Physical Exam:       Gen: A&O x 4, NAD, cooperative, obese   HEENT: NC/AT, EOMI, PERRL, mmm, neck supple, no meningeal signs;    Heart: regular   Lungs: no distress  Ext: no edema, no calf tenderness b/l  Psych: normal mood and affect  Skin: left medial thigh boil with in circulating redness and warmth    NEUROLOGIC EXAM:    Mental Status: A&O to self, location, month and year, NAD, speech clear, language fluent, repetition and naming intact, follows commands appropriately    Cranial Nerve Exam:   CN II-XII:, PERRL, VFF, no nystagmus, no gaze paresis, sensation V1-V3 intact b/l, muscles of facial expression symmetric; hearing intact to conversational tone, palate elevates symmetrically, shoulder elevation symmetric and tongue protrudes midline with movement side to side. Motor Exam:       Strength 5/5 UE's/LE's b/l  Tone and bulk normal   No pronator drift    Deep Tendon Reflexes: 2/4 biceps, triceps, brachioradialis, patellar, and achilles b/l; flexor plantar responses b/l    Sensation: Intact light touch UE's/LE's b/l    Coordination/Cerebellum:       Tremors--none      Rapidly alternating movements: no dysdiadochokinesia b/l                Finger-to-Nose: no dysmetria b/l    Gait and stance:      Gait: deferred      LABS:     Recent Labs     03/05/21  0833 03/06/21  0610   WBC 3.4*  --     138   K 4.2 4.6    106   CO2 27 30   BUN 9 12   CREATININE 0.9 0.9   GLUCOSE 81 79       IMAGING:    None recommended at this time     ASSESSMENT/PLAN:     [age]-year-old female with acute cellulitis infection superimposed on a repeat episode of convulsions likely breakthrough reactionary seizure versus pseudoseizure, no suspicion for status epilepticus. Neurology's goal is to keep her convulsive free during this admission by treating anxiety and pain. Plan of care as follows:  1. Neuro exam:  1. Non focal   2. Neurodiagnostics:  1. None recommended at this time  3. Medications:  1. Continue Keppra 1000mg BID  2. Ativan PRN   3. Morphine per pain scale defer to IM and GI & General Surgery  4. PT/OT/ST:  1. Per their recommendations   5. Follow-up:  1. Loosely, does not need EEG or continuous monitoring         Thank you for allowing us to participate in the care of your patient. If there are any questions regarding evaluation please feel free to contact us.      Luiz Crowley, APRN - CNP, 3/7/2021

## 2021-03-07 NOTE — PROGRESS NOTES
[address] [place] [address]  HOSPITALIST PROGRESS NOTE                       Name:  La Nena Saldivar /Age/Sex: 1970  (44 y.o. female)   MRN & CSN:  [id number] & [id number] Admission Date/Time: 3/3/2021  5:44 PM   Location:  [address] [place], [place] [address]- Attending:  Estefany Villar MD                                                  HPI  La Nena Saldivar is a 44 y.o. female who presents with left thigh and abdominal pain    SUBJECTIVE  Reported episode of seizure-like activity yesterday, still continues to complains of nausea and intermittent abdominal pain. 10 point review of systems reviewed and negative unless noted above. ALLERGIES:   Allergies   Allergen Reactions    Aspirin Palpitations     \"My Heart Rate Elevates\"    Shellfish-Derived Products Swelling    Toradol [Ketorolac Tromethamine] Rash    Compazine [Prochlorperazine]     Reglan [Metoclopramide] Itching       PCP: Lilibeth Massey MD    PAST MEDICAL HISTORY, SURGICAL HISTORY, SOCIAL HISTORY and  HOME MEDICATIONS all reviewed. OBJECTIVE  Vitals:    21 1820 21 0250 21 0914   BP: (!) 162/99 121/72 (!) 94/57 131/85   Pulse: 65  52 58   Resp: 19 23 13 18   Temp:  98.3 °F (36.8 °C) 97.7 °F (36.5 °C) 98.1 °F (36.7 °C)   TempSrc:  Oral Oral Oral   SpO2: 98% 99% 94% 95%   Weight:   285 lb (129.3 kg)    Height:           PHYSICAL EXAM   GEN Awake female, sitting upright in bed in no apparent distress  EYES Pupils are equally round. No scleral erythema, discharge, or conjunctivitis. HENT Mucous membranes are moist. Oral pharynx without exudates, no evidence of thrush. NECK Supple, no apparent thyromegaly or masses. RESP Clear to auscultation, no wheezes, rales or rhonchi. Symmetric chest movement   CARDIO/VASC S1/S2 auscultated. Regular rate without appreciable murmurs, rubs, or gallops. No JVD or carotid bruits. Peripheral pulses equal bilaterally and palpable. No peripheral edema.   GI Abdomen is soft without significant tenderness, masses, or guarding. Bowel sounds are normoactive.  No costovertebral angle tenderness. Normal appearing external genitalia. HEME/LYMPH No palpable cervical lymphadenopathy and no hepatosplenomegaly. No petechiae or ecchymoses. MSK Spontaneous movement of all extremities. No gross joint deformities. SKIN Normal coloration, warm, dry. NEURO Cranial nerves appear grossly intact, normal speech, no lateralizing weakness. PSYCH Awake, alert, oriented x 4. Affect appropriate. INTAKE: In: 120 [P.O.:120]  Out: -   OUTPUT: In: 120   Out: -     LABS  Recent Labs     03/05/21  0833   WBC 3.4*   HGB 10.3*   HCT 34.1*         Recent Labs     03/05/21  0833 03/06/21  0610    138   K 4.2 4.6    106   CO2 27 30   BUN 9 12   CREATININE 0.9 0.9     Recent Labs     03/05/21  0833   AST 17   ALT 16   BILITOT 0.2   ALKPHOS 95     No results for input(s): INR in the last 72 hours. No results for input(s): CKTOTAL, CKMB, CKMBINDEX, TROPONINT in the last 72 hours.        Abnormal labs for today noted      Imaging:     ECHO:    Microbiology:  Blood culture:    Urine culture:    Sputum culture:    Procedures done this admission:    MEDS  Scheduled Meds:   atenolol  25 mg Oral Daily    estradiol  0.5 mg Oral Daily    ferrous sulfate  325 mg Oral Daily with breakfast    levETIRAcetam  1,000 mg Oral BID    levothyroxine  125 mcg Oral Nightly    PARoxetine  50 mg Oral Nightly    sodium chloride flush  10 mL Intravenous 2 times per day    enoxaparin  40 mg Subcutaneous Daily    gabapentin  800 mg Oral TID    vancomycin  1,000 mg Intravenous Q8H     Continuous Infusions:  PRN Meds:oxyCODONE-acetaminophen, LORazepam, albuterol, dicyclomine, hydrOXYzine, promethazine, sodium chloride flush, magnesium hydroxide, acetaminophen **OR** acetaminophen, ondansetron **OR** ondansetron, morphine, tiZANidine        ASSESSMENT and PLAN  Hospital Day: 5    1-Left thigh cellulitic spot with LLE edema- DVT study negative, does report hx of MRSA- noted surgery consulted ID- on IV vanc, noted ID input- plan for biopsy in am  2-Acute on chronic abdominal pain associated with nausea- hx of gastric bypass/GERD/chronic pancreatitis-CT non-acute, advance diet as tolerated.  GI following  3-Seizure-like activity episode concerning for pseudoseizure- appreciate neuro input      Other issues  -seizure  -hx of lung nodules  -Chronic pancreatitis, not in exacerbation  -Morbid obesity with BMI of 47  -Hx of gastric bypass  -depression  -chronic pain syndrome follows with pain management          Disp:     Diet DIET GENERAL;  Diet NPO, After Midnight Exceptions are: Ice Chips, Sips of Water with Meds   DVT Prophylaxis [] Lovenox, []  Heparin, [] SCDs, [] Ambulation   GI Prophylaxis [] PPI,  [] H2 Blocker,  [] Carafate,  [] Diet/Tube Feeds   Code Status Full Code   Disposition Patient requires continued admission due to abdominal pain/cellulitis   CMS Level of Risk [] Low, [x] Moderate,[]  High  Patient's risk as above due to abdominal pain/cellulitis     RONNELL MONTILLA MD 3/7/2021 10:22 AM

## 2021-03-08 ENCOUNTER — ANESTHESIA (OUTPATIENT)
Dept: OPERATING ROOM | Age: 51
DRG: 603 | End: 2021-03-08
Payer: COMMERCIAL

## 2021-03-08 VITALS
RESPIRATION RATE: 16 BRPM | OXYGEN SATURATION: 100 % | DIASTOLIC BLOOD PRESSURE: 84 MMHG | SYSTOLIC BLOOD PRESSURE: 124 MMHG

## 2021-03-08 VITALS
HEART RATE: 52 BPM | RESPIRATION RATE: 31 BRPM | SYSTOLIC BLOOD PRESSURE: 128 MMHG | BODY MASS INDEX: 48.65 KG/M2 | HEIGHT: 64 IN | OXYGEN SATURATION: 100 % | TEMPERATURE: 97.6 F | DIASTOLIC BLOOD PRESSURE: 91 MMHG | WEIGHT: 285 LBS

## 2021-03-08 LAB
ANION GAP SERPL CALCULATED.3IONS-SCNC: 6 MMOL/L (ref 4–16)
BUN BLDV-MCNC: 16 MG/DL (ref 6–23)
CALCIUM SERPL-MCNC: 9.4 MG/DL (ref 8.3–10.6)
CHLORIDE BLD-SCNC: 108 MMOL/L (ref 99–110)
CO2: 28 MMOL/L (ref 21–32)
CREAT SERPL-MCNC: 0.9 MG/DL (ref 0.6–1.1)
GFR AFRICAN AMERICAN: >60 ML/MIN/1.73M2
GFR NON-AFRICAN AMERICAN: >60 ML/MIN/1.73M2
GLUCOSE BLD-MCNC: 86 MG/DL (ref 70–99)
POTASSIUM SERPL-SCNC: 4.8 MMOL/L (ref 3.5–5.1)
SODIUM BLD-SCNC: 142 MMOL/L (ref 135–145)

## 2021-03-08 PROCEDURE — 0HBJXZX EXCISION OF LEFT UPPER LEG SKIN, EXTERNAL APPROACH, DIAGNOSTIC: ICD-10-PCS | Performed by: SURGERY

## 2021-03-08 PROCEDURE — 3600000002 HC SURGERY LEVEL 2 BASE: Performed by: SURGERY

## 2021-03-08 PROCEDURE — 3700000000 HC ANESTHESIA ATTENDED CARE: Performed by: SURGERY

## 2021-03-08 PROCEDURE — 2500000003 HC RX 250 WO HCPCS: Performed by: NURSE ANESTHETIST, CERTIFIED REGISTERED

## 2021-03-08 PROCEDURE — 6360000002 HC RX W HCPCS: Performed by: HOSPITALIST

## 2021-03-08 PROCEDURE — 2580000003 HC RX 258: Performed by: INTERNAL MEDICINE

## 2021-03-08 PROCEDURE — 87186 SC STD MICRODIL/AGAR DIL: CPT

## 2021-03-08 PROCEDURE — 87205 SMEAR GRAM STAIN: CPT

## 2021-03-08 PROCEDURE — 80048 BASIC METABOLIC PNL TOTAL CA: CPT

## 2021-03-08 PROCEDURE — 88304 TISSUE EXAM BY PATHOLOGIST: CPT

## 2021-03-08 PROCEDURE — 87102 FUNGUS ISOLATION CULTURE: CPT

## 2021-03-08 PROCEDURE — 6370000000 HC RX 637 (ALT 250 FOR IP): Performed by: HOSPITALIST

## 2021-03-08 PROCEDURE — 87070 CULTURE OTHR SPECIMN AEROBIC: CPT

## 2021-03-08 PROCEDURE — 94761 N-INVAS EAR/PLS OXIMETRY MLT: CPT

## 2021-03-08 PROCEDURE — 2500000003 HC RX 250 WO HCPCS: Performed by: SURGERY

## 2021-03-08 PROCEDURE — 3700000001 HC ADD 15 MINUTES (ANESTHESIA): Performed by: SURGERY

## 2021-03-08 PROCEDURE — 6360000002 HC RX W HCPCS: Performed by: INTERNAL MEDICINE

## 2021-03-08 PROCEDURE — 3600000012 HC SURGERY LEVEL 2 ADDTL 15MIN: Performed by: SURGERY

## 2021-03-08 PROCEDURE — 88312 SPECIAL STAINS GROUP 1: CPT

## 2021-03-08 PROCEDURE — 87116 MYCOBACTERIA CULTURE: CPT

## 2021-03-08 PROCEDURE — 6370000000 HC RX 637 (ALT 250 FOR IP): Performed by: PHYSICIAN ASSISTANT

## 2021-03-08 PROCEDURE — 2709999900 HC NON-CHARGEABLE SUPPLY: Performed by: SURGERY

## 2021-03-08 PROCEDURE — 11406 EXC TR-EXT B9+MARG >4.0 CM: CPT | Performed by: SURGERY

## 2021-03-08 PROCEDURE — 6360000002 HC RX W HCPCS: Performed by: NURSE ANESTHETIST, CERTIFIED REGISTERED

## 2021-03-08 PROCEDURE — 87077 CULTURE AEROBIC IDENTIFY: CPT

## 2021-03-08 PROCEDURE — 2580000003 HC RX 258: Performed by: ANESTHESIOLOGY

## 2021-03-08 PROCEDURE — 87075 CULTR BACTERIA EXCEPT BLOOD: CPT

## 2021-03-08 PROCEDURE — 6370000000 HC RX 637 (ALT 250 FOR IP): Performed by: INTERNAL MEDICINE

## 2021-03-08 RX ORDER — KETAMINE HCL 50MG/ML(1)
SYRINGE (ML) INTRAVENOUS PRN
Status: DISCONTINUED | OUTPATIENT
Start: 2021-03-08 | End: 2021-03-08 | Stop reason: SDUPTHER

## 2021-03-08 RX ORDER — MEPERIDINE HYDROCHLORIDE 25 MG/ML
12.5 INJECTION INTRAMUSCULAR; INTRAVENOUS; SUBCUTANEOUS EVERY 5 MIN PRN
Status: DISCONTINUED | OUTPATIENT
Start: 2021-03-08 | End: 2021-03-08 | Stop reason: HOSPADM

## 2021-03-08 RX ORDER — SODIUM CHLORIDE, SODIUM LACTATE, POTASSIUM CHLORIDE, CALCIUM CHLORIDE 600; 310; 30; 20 MG/100ML; MG/100ML; MG/100ML; MG/100ML
INJECTION, SOLUTION INTRAVENOUS CONTINUOUS
Status: DISCONTINUED | OUTPATIENT
Start: 2021-03-08 | End: 2021-03-08 | Stop reason: HOSPADM

## 2021-03-08 RX ORDER — HYDRALAZINE HYDROCHLORIDE 20 MG/ML
5 INJECTION INTRAMUSCULAR; INTRAVENOUS EVERY 10 MIN PRN
Status: DISCONTINUED | OUTPATIENT
Start: 2021-03-08 | End: 2021-03-08 | Stop reason: HOSPADM

## 2021-03-08 RX ORDER — GLYCOPYRROLATE 1 MG/5 ML
SYRINGE (ML) INTRAVENOUS PRN
Status: DISCONTINUED | OUTPATIENT
Start: 2021-03-08 | End: 2021-03-08 | Stop reason: SDUPTHER

## 2021-03-08 RX ORDER — PROPOFOL 10 MG/ML
INJECTION, EMULSION INTRAVENOUS PRN
Status: DISCONTINUED | OUTPATIENT
Start: 2021-03-08 | End: 2021-03-08 | Stop reason: SDUPTHER

## 2021-03-08 RX ORDER — HYDROCODONE BITARTRATE AND ACETAMINOPHEN 5; 325 MG/1; MG/1
2 TABLET ORAL EVERY 6 HOURS PRN
Status: DISCONTINUED | OUTPATIENT
Start: 2021-03-08 | End: 2021-03-08 | Stop reason: HOSPADM

## 2021-03-08 RX ORDER — HYDROCODONE BITARTRATE AND ACETAMINOPHEN 5; 325 MG/1; MG/1
1 TABLET ORAL PRN
Status: DISCONTINUED | OUTPATIENT
Start: 2021-03-08 | End: 2021-03-08 | Stop reason: HOSPADM

## 2021-03-08 RX ORDER — HYDROMORPHONE HCL 110MG/55ML
0.5 PATIENT CONTROLLED ANALGESIA SYRINGE INTRAVENOUS EVERY 5 MIN PRN
Status: DISCONTINUED | OUTPATIENT
Start: 2021-03-08 | End: 2021-03-08 | Stop reason: HOSPADM

## 2021-03-08 RX ORDER — HEPARIN SODIUM (PORCINE) LOCK FLUSH IV SOLN 100 UNIT/ML 100 UNIT/ML
500 SOLUTION INTRAVENOUS PRN
Status: DISCONTINUED | OUTPATIENT
Start: 2021-03-08 | End: 2021-03-08 | Stop reason: HOSPADM

## 2021-03-08 RX ORDER — DIPHENHYDRAMINE HYDROCHLORIDE 50 MG/ML
12.5 INJECTION INTRAMUSCULAR; INTRAVENOUS
Status: DISCONTINUED | OUTPATIENT
Start: 2021-03-08 | End: 2021-03-08 | Stop reason: HOSPADM

## 2021-03-08 RX ORDER — FENTANYL CITRATE 50 UG/ML
50 INJECTION, SOLUTION INTRAMUSCULAR; INTRAVENOUS EVERY 5 MIN PRN
Status: DISCONTINUED | OUTPATIENT
Start: 2021-03-08 | End: 2021-03-08 | Stop reason: HOSPADM

## 2021-03-08 RX ORDER — ONDANSETRON 2 MG/ML
INJECTION INTRAMUSCULAR; INTRAVENOUS PRN
Status: DISCONTINUED | OUTPATIENT
Start: 2021-03-08 | End: 2021-03-08 | Stop reason: SDUPTHER

## 2021-03-08 RX ORDER — PROMETHAZINE HYDROCHLORIDE 25 MG/ML
6.25 INJECTION, SOLUTION INTRAMUSCULAR; INTRAVENOUS
Status: DISCONTINUED | OUTPATIENT
Start: 2021-03-08 | End: 2021-03-08 | Stop reason: HOSPADM

## 2021-03-08 RX ORDER — CLINDAMYCIN HYDROCHLORIDE 300 MG/1
300 CAPSULE ORAL 4 TIMES DAILY
Qty: 28 CAPSULE | Refills: 0 | Status: SHIPPED | OUTPATIENT
Start: 2021-03-08 | End: 2021-03-15

## 2021-03-08 RX ORDER — LIDOCAINE HYDROCHLORIDE 20 MG/ML
INJECTION, SOLUTION INTRAVENOUS PRN
Status: DISCONTINUED | OUTPATIENT
Start: 2021-03-08 | End: 2021-03-08 | Stop reason: SDUPTHER

## 2021-03-08 RX ORDER — 0.9 % SODIUM CHLORIDE 0.9 %
500 INTRAVENOUS SOLUTION INTRAVENOUS
Status: DISCONTINUED | OUTPATIENT
Start: 2021-03-08 | End: 2021-03-08 | Stop reason: HOSPADM

## 2021-03-08 RX ORDER — ONDANSETRON 2 MG/ML
4 INJECTION INTRAMUSCULAR; INTRAVENOUS
Status: DISCONTINUED | OUTPATIENT
Start: 2021-03-08 | End: 2021-03-08 | Stop reason: HOSPADM

## 2021-03-08 RX ORDER — LABETALOL HYDROCHLORIDE 5 MG/ML
5 INJECTION, SOLUTION INTRAVENOUS EVERY 10 MIN PRN
Status: DISCONTINUED | OUTPATIENT
Start: 2021-03-08 | End: 2021-03-08 | Stop reason: HOSPADM

## 2021-03-08 RX ORDER — MECLIZINE HCL 12.5 MG/1
12.5 TABLET ORAL 3 TIMES DAILY PRN
Qty: 15 TABLET | Refills: 0 | Status: SHIPPED | OUTPATIENT
Start: 2021-03-08 | End: 2021-03-22 | Stop reason: SDUPTHER

## 2021-03-08 RX ADMIN — Medication 0.1 MG: at 14:37

## 2021-03-08 RX ADMIN — HYDROXYZINE PAMOATE 50 MG: 25 CAPSULE ORAL at 11:47

## 2021-03-08 RX ADMIN — MORPHINE SULFATE 2 MG: 2 INJECTION, SOLUTION INTRAMUSCULAR; INTRAVENOUS at 06:47

## 2021-03-08 RX ADMIN — ATENOLOL 25 MG: 25 TABLET ORAL at 10:54

## 2021-03-08 RX ADMIN — Medication 0.2 MG: at 14:32

## 2021-03-08 RX ADMIN — TIZANIDINE 4 MG: 4 TABLET ORAL at 06:47

## 2021-03-08 RX ADMIN — SODIUM CHLORIDE, PRESERVATIVE FREE 10 ML: 5 INJECTION INTRAVENOUS at 10:55

## 2021-03-08 RX ADMIN — VANCOMYCIN HYDROCHLORIDE 1000 MG: 1 INJECTION, POWDER, LYOPHILIZED, FOR SOLUTION INTRAVENOUS at 11:47

## 2021-03-08 RX ADMIN — OXYCODONE HYDROCHLORIDE AND ACETAMINOPHEN 1 TABLET: 5; 325 TABLET ORAL at 04:34

## 2021-03-08 RX ADMIN — PROMETHAZINE HYDROCHLORIDE 25 MG: 25 TABLET ORAL at 06:47

## 2021-03-08 RX ADMIN — ESTRADIOL 0.5 MG: 1 TABLET ORAL at 10:54

## 2021-03-08 RX ADMIN — Medication 500 UNITS: at 17:32

## 2021-03-08 RX ADMIN — LEVETIRACETAM 1000 MG: 500 TABLET, FILM COATED ORAL at 10:54

## 2021-03-08 RX ADMIN — GABAPENTIN 800 MG: 400 CAPSULE ORAL at 10:54

## 2021-03-08 RX ADMIN — DICYCLOMINE HYDROCHLORIDE 10 MG: 10 CAPSULE ORAL at 06:47

## 2021-03-08 RX ADMIN — ENOXAPARIN SODIUM 40 MG: 40 INJECTION SUBCUTANEOUS at 10:54

## 2021-03-08 RX ADMIN — LORAZEPAM 2 MG: 2 INJECTION INTRAMUSCULAR; INTRAVENOUS at 04:34

## 2021-03-08 RX ADMIN — SODIUM CHLORIDE, POTASSIUM CHLORIDE, SODIUM LACTATE AND CALCIUM CHLORIDE: 600; 310; 30; 20 INJECTION, SOLUTION INTRAVENOUS at 13:36

## 2021-03-08 RX ADMIN — PROPOFOL 10 MG: 10 INJECTION, EMULSION INTRAVENOUS at 14:26

## 2021-03-08 RX ADMIN — MORPHINE SULFATE 2 MG: 2 INJECTION, SOLUTION INTRAMUSCULAR; INTRAVENOUS at 02:13

## 2021-03-08 RX ADMIN — ONDANSETRON 4 MG: 2 INJECTION INTRAMUSCULAR; INTRAVENOUS at 14:26

## 2021-03-08 RX ADMIN — LIDOCAINE HYDROCHLORIDE 100 MG: 20 INJECTION, SOLUTION INTRAVENOUS at 14:29

## 2021-03-08 RX ADMIN — MORPHINE SULFATE 2 MG: 2 INJECTION, SOLUTION INTRAMUSCULAR; INTRAVENOUS at 11:47

## 2021-03-08 RX ADMIN — Medication 50 MG: at 14:38

## 2021-03-08 RX ADMIN — PROPOFOL 200 MG: 10 INJECTION, EMULSION INTRAVENOUS at 14:29

## 2021-03-08 ASSESSMENT — PULMONARY FUNCTION TESTS
PIF_VALUE: 1
PIF_VALUE: 0
PIF_VALUE: 1

## 2021-03-08 ASSESSMENT — PAIN - FUNCTIONAL ASSESSMENT: PAIN_FUNCTIONAL_ASSESSMENT: ACTIVITIES ARE NOT PREVENTED

## 2021-03-08 ASSESSMENT — PAIN SCALES - GENERAL
PAINLEVEL_OUTOF10: 8

## 2021-03-08 ASSESSMENT — PAIN DESCRIPTION - ONSET: ONSET: ON-GOING

## 2021-03-08 ASSESSMENT — PAIN DESCRIPTION - DESCRIPTORS: DESCRIPTORS: CONSTANT;CRAMPING

## 2021-03-08 ASSESSMENT — PAIN DESCRIPTION - LOCATION: LOCATION: ABDOMEN

## 2021-03-08 NOTE — CARE COORDINATION
Followed up with patient for continued discharge planning. Pt confirmed plan to return to her mother's home at discharge and denied any needs at discharge. CM remains available if needs arise.

## 2021-03-08 NOTE — OP NOTE
OPERATIVE / PROCEDURE NOTE    Andre Pope Josué, 1970, 44 y.o.,  female, CSN: [id number]  March 8, 2021    PRE-OP DIAGNOSIS: Left medial thigh skin lesion, infected, 2 cm in greatest dimension, r/o Bacteria, Fungus, AFB, and Abnormal cells. POST-OP DIAGNOSIS:  Same lab and path pending. PROCEDURE(S):  Excision of Left medial thigh skin lesion, infected, 2 cm in greatest dimension, transverse elliptical incision was performed 6 cm. SURGEON(S):   Barak Morales M.D., F.TYRESE.C.S., F.I.C.S. ASSISTANT:  NONE    ANESTHESIA: MAC + Local 1% Xylocaine with Epinephrine. SPECIMENS: Both (Cut in half) sent to the lab and pathology. ESTIMATED BLOOD LOSS:  Less then 2 ml           DRAIN: NONE    COMPLICATIONS: NONE           CONDITION / DISPOSITION:  Stable, to PACU      OPERATIVE DESCRIPTION:    After proper informed consent was signed by the  patient knowing all the risks, benefits, potential complications and possible  alternatives of the procedure, the patient was appropriately identified. Betadine and alcohol skin prep was used. The patient was first in supine position then in left lateral decubitus/prone position. The left upper upper thigh was prepped and draped in the usual sterile fashion. The above-mentioned anesthetic was used to  anesthetize the planned excision site and all the way around and deep to the  skin lesion. It was then excised in its entirety with a 15 blade scalpel, using an elliptical incision along Brittney's lines, 6 cm in length, then the wound was re-approximated in 2 layers using 3-0 Vicryl suture in a running continuous fashion, deep dermis and subcuticular. The Dermabond skin glue was used to cover the wound. The patient tolerated the procedure well without any complications.     ____________________________________________    Barak Morales MD, FACS, FICS    3/8/2021  3:12 PM

## 2021-03-08 NOTE — DISCHARGE SUMMARY
Discharge Summary    Name:  Natalie Stroud /Age/Sex: 1970  (44 y.o. female)   MRN & CSN:  [id number] & [id number] Admission Date/Time: 3/3/2021  5:44 PM   Attending:  Rome Tuttle MD Discharging Physician: Maday Lou MD     HPI and Hospital Course:   Natalie Stroud is a 44 y.o.  female  who presented with left thigh cellulitis    HPI- as per H and [address]    1-Left thigh cellulitic spot with LLE edema- DVT study negative, does report hx of MRSA- noted surgery consulted ID- on IV vanc, noted ID input- plan for biopsy done- results to be followed up as an outpatient  2-Acute on chronic abdominal pain associated with nausea- hx of gastric bypass/GERD/chronic pancreatitis-CT non-acute, tolerated advanced diet. 3-Seizure-like activity episode concerning for pseudoseizure- appreciate neuro input        Other issues  -seizure  -hx of lung nodules  -Chronic pancreatitis, not in exacerbation  -Morbid obesity with BMI of 47  -Hx of gastric bypass  -depression  -chronic pain syndrome follows with pain management       The patient expressed appropriate understanding of and agreement with the discharge recommendations, medications, and plan.      Consults this admission:  IP CONSULT TO HOSPITALIST  IP CONSULT TO GI  IP CONSULT TO GENERAL SURGERY  PHARMACY TO DOSE VANCOMYCIN  IP CONSULT TO INFECTIOUS DISEASES  IP CONSULT TO NEUROLOGY    Discharge Instruction:   Follow up appointments:   Primary care physician:  within 2 weeks    Diet:  General/cardiac/ADA/as tolerated  Activity: {discharge activity: as tolerated  Disposition: Discharged to:   [x]Home, []C, []SNF, []Acute Rehab, []Hospice   Condition on discharge: Stable    Discharge Medications:      Avi Pope Medication Instructions MARINE:[phone]    Printed on:21 1137   Medication Information                      albuterol (PROVENTIL) (2.5 MG/3ML) 0.083% nebulizer solution  Take 3 mLs by nebulization every 6 hours as needed for Wheezing             albuterol sulfate  (90 Base) MCG/ACT inhaler  Inhale 2 puffs into the lungs 4 times daily             atenolol (TENORMIN) 25 MG tablet  Take 25 mg by mouth daily             Calcium Citrate-Vitamin D (CALCIUM + VIT D, BARIATRIC ADVANTAGE, CHEWABLE TABLET)  Take 1 tablet by mouth 2 times daily             Cholecalciferol (VITAMIN D3) 5000 units TABS  Take 1 tablet by mouth daily             clindamycin (CLEOCIN) 300 MG capsule  Take 1 capsule by mouth 4 times daily for 7 days             dicyclomine (BENTYL) 10 MG capsule  Take 1 capsule by mouth 3 times daily as needed (abdominal pain)             estradiol (ESTRACE) 0.5 MG tablet  Take 0.5 mg by mouth daily             ferrous sulfate (IRON 325) 325 (65 Fe) MG tablet  Take 1 tablet by mouth daily (with breakfast)             gabapentin (NEURONTIN) 800 MG tablet  Take 800 mg by mouth 3 times daily             hydrOXYzine (VISTARIL) 50 MG capsule  Take 1 capsule by mouth every 6 hours as needed for Anxiety             levETIRAcetam (KEPPRA) 1000 MG tablet  Take 1 tablet by mouth 2 times daily             levothyroxine (SYNTHROID) 125 MCG tablet  Take 1 tablet by mouth Daily             meclizine (ANTIVERT) 12.5 MG tablet  Take 1 tablet by mouth 3 times daily as needed for Dizziness             Multiple Vitamin (MVI, BARIATRIC ADVANTAGE MULTI-FORMULA, CHEW TAB)  Take 1 tablet by mouth daily             oxyCODONE-acetaminophen (PERCOCET) 5-325 MG per tablet  Take 1 tablet by mouth.  Every 4-6 hours PRN             PARoxetine (PAXIL) 30 MG tablet  Take 50 mg by mouth nightly              promethazine (PHENERGAN) 25 MG tablet  Take 1 tablet by mouth every 8 hours as needed for Nausea                 Objective Findings at Discharge:   BP (!) 128/91   Pulse 52   Temp 97.6 °F (36.4 °C) (Oral)   Resp (!) 31   Ht 5' 4.25\" (1.632 m)   Wt 285 lb (129.3 kg)   SpO2 100%   BMI 48.54 kg/m²            PHYSICAL EXAM  GEN Awake female, laying in bed in no apparent distress. EYES Pupils are equally round. No scleral discharge  HENT Atraumatic and symmetric head  NECK No apparent thyromegaly  RESP Symmetric chest movement while on room air. CARDIO/VASC Peripheral pulses equal bilaterally and palpable. GI Abdomen is not distended. Rectal exam deferred.  Ryder catheter is not present. HEME/LYMPH No petechiae or ecchymoses. MSK Spontaneous movement of BL upper extremities  SKIN Normal coloration, warm, dry. NEURO Cranial nerves appear grossly intact  PSYCH Awake, alert.     BMP/CBC  Recent Labs     03/06/21  0610 03/08/21  0500    142   K 4.6 4.8    108   CO2 30 28   BUN 12 16   CREATININE 0.9 0.9     SIGNIFICANT IMAGING AND LABS:      Discharge Time of 32 minutes    Electronically signed by Fara Last MD on 3/8/2021 at 11:37 AM

## 2021-03-08 NOTE — PLAN OF CARE
Problem: Pain:  Description: Pain management should include both nonpharmacologic and pharmacologic interventions.   Goal: Pain level will decrease  Description: Pain level will decrease  Outcome: Ongoing  Goal: Control of acute pain  Description: Control of acute pain  Outcome: Ongoing  Goal: Control of chronic pain  Description: Control of chronic pain  Outcome: Ongoing     Problem: Nausea/Vomiting:  Goal: Absence of nausea/vomiting  Description: Absence of nausea/vomiting  Outcome: Ongoing  Goal: Able to drink  Description: Able to drink  Outcome: Ongoing  Goal: Able to eat  Description: Able to eat  Outcome: Ongoing  Goal: Ability to achieve adequate nutritional intake will improve  Description: Ability to achieve adequate nutritional intake will improve  Outcome: Ongoing eyeglasses

## 2021-03-08 NOTE — PROGRESS NOTES
GENERAL SURGERY PROGRESS NOTE    CC/HPI:           Patient feels ok. . no new c/o. Vitals:    03/07/21 1516 03/07/21 2141 03/08/21 1045 03/08/21 1240   BP: 103/75 (!) 162/85 (!) 128/91    Pulse: 55 73 52    Resp: 19 20 (!) 31    Temp:  97.9 °F (36.6 °C) 97.6 °F (36.4 °C)    TempSrc:  Oral Oral    SpO2:  100% 100% 100%   Weight:       Height:         I/O last 3 completed shifts: In: 240 [P.O.:240]  Out: -   No intake/output data recorded. Diet NPO, After Midnight Exceptions are: Ice Chips, Sips of Water with Meds    Recent Results (from the past 48 hour(s))   POCT Glucose    Collection Time: 03/06/21  6:12 PM   Result Value Ref Range    POC Glucose 102 (H) 70 - 99 MG/DL   Culture, MRSA, Screening    Collection Time: 03/06/21  6:38 PM    Specimen: Nasal   Result Value Ref Range    Specimen NASAL     Special Requests NONE     Culture Prelim Report Further report to follow    Vancomycin, trough    Collection Time: 03/07/21  5:25 AM   Result Value Ref Range    Vancomycin Tr 23.4 (H) 10 - 20 UG/ML    DOSE AMOUNT DOSE AMT.  GIVEN - 1gm     DOSE TIME DOSE TIME GIVEN - 8491    Basic Metabolic Panel    Collection Time: 03/08/21  5:00 AM   Result Value Ref Range    Sodium 142 135 - 145 MMOL/L    Potassium 4.8 3.5 - 5.1 MMOL/L    Chloride 108 99 - 110 mMol/L    CO2 28 21 - 32 MMOL/L    Anion Gap 6 4 - 16    BUN 16 6 - 23 MG/DL    CREATININE 0.9 0.6 - 1.1 MG/DL    Glucose 86 70 - 99 MG/DL    Calcium 9.4 8.3 - 10.6 MG/DL    GFR Non-African American >60 >60 mL/min/1.73m2    GFR African American >60 >60 mL/min/1.73m2       Scheduled Meds:   vancomycin  1,000 mg Intravenous Q12H    atenolol  25 mg Oral Daily    estradiol  0.5 mg Oral Daily    ferrous sulfate  325 mg Oral Daily with breakfast    levETIRAcetam  1,000 mg Oral BID    levothyroxine  125 mcg Oral Nightly    PARoxetine  50 mg Oral Nightly    sodium chloride flush  10 mL Intravenous 2 times per day    enoxaparin  40 mg Subcutaneous Daily    gabapentin  800 mg Oral TID       Continuous Infusions:   lactated ringers 100 mL/hr at 03/08/21 1336       Physical Exam:  HEENT: Anicteric sclerae, Oropharyngeal mucosae moist, pink and intact. Heart:  Normal S1 and S2, RRR  Lungs: Clear to auscultation bilaterally, No audible Wheezes or Rales. Extremities: No edema. Neuro: Alert and Oriented x 3, Non focal.  Abdomen: Soft, Benign, Non tender, Non distended, Positive bowel sounds. Incision: Nicely healing: No erythema, No discharge. Active Problems:    Cellulitis of left thigh  Resolved Problems:    * No resolved hospital problems. *      Assessment and Plan:  Nery Perez is a 44 y.o. female with chronic abdominal pain. . RUQ, s/p MULTIPLE ERCPs, and now a small left thigh superficial skin infection with SMALL cellulitis, NOT in need for surgical incision and drainage, continue Abx topically, and waiting for ID's recommendation. GI Dr Hartman Bur:  \"RECOMMENDATIONS:  1. Agree with present management. 2.  We will follow up on the CAT scan report. 3.  Start the patient on clear liquid diet, advance as tolerated. 4.  General surgery and ID consult in order. 5.  If the patient continues to be symptomatic with abdominal pain, we  will discuss the case with the surgical consultant, Dr. Ruchi Astorga, for  possible diagnostic laparoscopy in view of the patient's intractable  abdominal pain requiring multiple hospitalizations and CAT scans. 6.  The case and plan have been discussed in detail with the patient. \"       I will follow. Called by Dr Malachi Lu to biopsy the skin lesion, he wants to r/o Histopath/Bact/Ave/AFB. .  Will do TODAY. Increase Ambulation to at least 4x/day walk in the hallways with assistance. Respiratory cha-operative care: Incentive Spirometry / deep breathing and coughing 10x/hr while awake. Continue DVT prophylaxis with Teds and SCDs and SC Lovenox.     ___________________________________________    Laura Martinez MD, FACS, FICS  3/8/2021  2:19 PM

## 2021-03-08 NOTE — PROGRESS NOTES
[address] [facility]  consulted by Dr. Francis Guidry for monitoring and adjustment. Indication for treatment: LLE Cellulitis  Goal trough: 10 - 15 mcg/mL    Pertinent Laboratory Values:   Temp Readings from Last 3 Encounters:   03/08/21 97.6 °F (36.4 °C) (Oral)   02/17/21 96.7 °F (35.9 °C)   02/13/21 98.2 °F (36.8 °C) (Oral)     No results for input(s): WBC, LACTATE in the last 72 hours. Recent Labs     03/06/21  0610 03/08/21  0500   BUN 12 16   CREATININE 0.9 0.9     Estimated Creatinine Clearance: 98 mL/min (based on SCr of 0.9 mg/dL). Intake/Output Summary (Last 24 hours) at 3/8/2021 1606  Last data filed at 3/8/2021 1450  Gross per 24 hour   Intake 400 ml   Output --   Net 400 ml       Pertinent Cultures:  Date    Source    Results  03/03   COVID-19   Not detected  03/04   Blood    No growth  3/4                              MRSA screen                          Ordered      VANCOMYCIN TROUGH:    Recent Labs     03/07/21  0525   VANCOTROUGH 23.4*     VANCOMYCIN RANDOM:  No results for input(s): VANCORANDOM in the last 72 hours. Assessment:  · WBC low on 3/5, pt remains afebrile  · SCr remains normal, no UOP data  · Day(s) of therapy: 5  · Vancomycin concentration:   · 03/05 - 25.9, false trough about 3 hours post 1gm dose  · 3/7 - 23.4, 6 hour level    Plan:  · Vanco trough drawn a little early yesterday resulted @23.4, true trough likely just under 20. Will target a trough closer to 10-15 for cellulitis. · Renal trends remain normal.  · Continue decreased dose of Vancomycin 1,000 mg IV Q 12 Hours  · Recheck the vanco level tomorrow am to monitor for accumulation  · Pharmacy will continue to monitor patient and adjust therapy as indicated    [address] 03/09/21 @ 7:30 AM    Thank you for the consult. Mouna Payan   3/8/2021 4:06 PM

## 2021-03-09 ENCOUNTER — CARE COORDINATION (OUTPATIENT)
Dept: CASE MANAGEMENT | Age: 51
End: 2021-03-09

## 2021-03-09 LAB
CULTURE: NORMAL
EKG ATRIAL RATE: 70 BPM
EKG DIAGNOSIS: NORMAL
EKG P AXIS: 43 DEGREES
EKG P-R INTERVAL: 190 MS
EKG Q-T INTERVAL: 386 MS
EKG QRS DURATION: 80 MS
EKG QTC CALCULATION (BAZETT): 416 MS
EKG R AXIS: -9 DEGREES
EKG T AXIS: 31 DEGREES
EKG VENTRICULAR RATE: 70 BPM
Lab: NORMAL
SPECIMEN: NORMAL

## 2021-03-09 NOTE — CARE COORDINATION
John[facility] Transitions Initial Follow Up Call    Call within 2 business days of discharge: Yes    Patient: Basim Virk Patient : 1970   MRN: [id number]  Reason for Admission: Left Thigh Cellulitis s/p I&D, bx pending; A/C Abd Pain  Discharge Date: 3/8/21 RARS: Readmission Risk Score: 29  Facility: [address] [place] [address]       Complaint Diagnosis Description Type Department Provider    3/3/21 Wound Check; Emesis; Abdominal Pain Intractable vomiting with nausea, unspecified vomiting type . .. ED to Hosp-Admission (Discharged) (ADMITTED) King Muñoz MD; Susie Carrington,...      . CARE TRANSITION/COVID19 RISK MONITORING  COVID19 SCREEN: 21 Negative; Not retested this admission  PATIENT RISK FACTORS: Lupus, Copd  RARS: 28  [facility] PCP: BECKI Royal    1st attempt to reach for discharge call unsuccessful; message left requesting call back. Alternate numbers not in service. No HIPAA form on file for approved EC.    Juan A Yañez RN

## 2021-03-10 ENCOUNTER — CARE COORDINATION (OUTPATIENT)
Dept: CASE MANAGEMENT | Age: 51
End: 2021-03-10

## 2021-03-14 LAB
CULTURE: ABNORMAL
CULTURE: ABNORMAL
GRAM SMEAR: ABNORMAL
Lab: ABNORMAL
SPECIMEN: ABNORMAL

## 2021-03-14 RX ORDER — PROMETHAZINE HYDROCHLORIDE 25 MG/1
25 TABLET ORAL EVERY 8 HOURS PRN
Qty: 30 TABLET | Refills: 3 | Status: ON HOLD | OUTPATIENT
Start: 2021-03-14 | End: 2021-05-03 | Stop reason: SDUPTHER

## 2021-03-17 ENCOUNTER — OFFICE VISIT (OUTPATIENT)
Dept: BARIATRICS/WEIGHT MGMT | Age: 51
End: 2021-03-17
Payer: COMMERCIAL

## 2021-03-17 VITALS
HEIGHT: 64 IN | DIASTOLIC BLOOD PRESSURE: 86 MMHG | BODY MASS INDEX: 47.21 KG/M2 | SYSTOLIC BLOOD PRESSURE: 108 MMHG | WEIGHT: 276.5 LBS | TEMPERATURE: 98.3 F | HEART RATE: 80 BPM

## 2021-03-17 DIAGNOSIS — Z98.84 STATUS POST LAPAROSCOPIC SLEEVE GASTRECTOMY: Primary | ICD-10-CM

## 2021-03-17 DIAGNOSIS — E66.01 MORBID OBESITY WITH BMI OF 45.0-49.9, ADULT (HCC): ICD-10-CM

## 2021-03-17 PROCEDURE — 3017F COLORECTAL CA SCREEN DOC REV: CPT | Performed by: SURGERY

## 2021-03-17 PROCEDURE — G8484 FLU IMMUNIZE NO ADMIN: HCPCS | Performed by: SURGERY

## 2021-03-17 PROCEDURE — 99213 OFFICE O/P EST LOW 20 MIN: CPT | Performed by: SURGERY

## 2021-03-17 PROCEDURE — G8417 CALC BMI ABV UP PARAM F/U: HCPCS | Performed by: SURGERY

## 2021-03-17 PROCEDURE — 1111F DSCHRG MED/CURRENT MED MERGE: CPT | Performed by: SURGERY

## 2021-03-17 PROCEDURE — 1036F TOBACCO NON-USER: CPT | Performed by: SURGERY

## 2021-03-17 PROCEDURE — G8427 DOCREV CUR MEDS BY ELIG CLIN: HCPCS | Performed by: SURGERY

## 2021-03-17 RX ORDER — TIZANIDINE 4 MG/1
4 TABLET ORAL EVERY 8 HOURS PRN
COMMUNITY
Start: 2021-03-03

## 2021-03-17 ASSESSMENT — ENCOUNTER SYMPTOMS
DIARRHEA: 0
BLOOD IN STOOL: 0
NAUSEA: 0
ABDOMINAL DISTENTION: 1
CONSTIPATION: 0
VOMITING: 0
WHEEZING: 0
TROUBLE SWALLOWING: 0
VOICE CHANGE: 0
BACK PAIN: 1
ABDOMINAL PAIN: 1
PHOTOPHOBIA: 0
SORE THROAT: 0
COUGH: 0
ANAL BLEEDING: 0
COLOR CHANGE: 0
SHORTNESS OF BREATH: 1

## 2021-03-17 NOTE — PROGRESS NOTES
BARIATRIC SURGERY POST OPERATIVE NOTE    SUBJECTIVE:    Patient presenting today referred from Fern Rodriguez MD, for   Chief Complaint   Patient presents with   Rufina Justino Weight Management     4th WM Visit, Conversion to RYGB     /86 (Site: Left Upper Arm, Position: Sitting, Cuff Size: Large Adult)   Pulse 80   Temp 98.3 °F (36.8 °C) (Infrared)   Ht 5' 4.25\" (1.632 m)   Wt 276 lb 8 oz (125.4 kg)   BMI 47.09 kg/m²      HPI: Natalie Stroud is a 44 y.o. female Patient dines out to a sit down restaurant 0 times per month. Patient eats fast food meals 0 times per month. Drinks mostly diet pepsi and water    24 hour recall/food frequency chart:  Breakfast: Maple Oatmeal with Glass Almondmilk  Snack: Celery  Lunch: Protein Shake  Snack: Celery  Dinner: Celery  Snack: Peanut Butter Crackers    Total daily calories: 800-1000 liam      Exercises Not much    Addressed the status of the following co-morbidities:   1. Leg and hands swelling  worsening. 2. Abdominal pain  worsening. 3. Nausea and vomiting  worsening.       Current Outpatient Medications:     tiZANidine (ZANAFLEX) 4 MG tablet, TAKE 1 TABLET BY MOUTH THREE TIMES DAILY AS NEEDED, Disp: , Rfl:     promethazine (PHENERGAN) 25 MG tablet, Take 1 tablet by mouth every 8 hours as needed for Nausea, Disp: 30 tablet, Rfl: 3    meclizine (ANTIVERT) 12.5 MG tablet, Take 1 tablet by mouth 3 times daily as needed for Dizziness, Disp: 15 tablet, Rfl: 0    albuterol (PROVENTIL) (2.5 MG/3ML) 0.083% nebulizer solution, Take 3 mLs by nebulization every 6 hours as needed for Wheezing, Disp: 120 each, Rfl: 3    hydrOXYzine (VISTARIL) 50 MG capsule, Take 1 capsule by mouth every 6 hours as needed for Anxiety, Disp: 20 capsule, Rfl: 0    albuterol sulfate  (90 Base) MCG/ACT inhaler, Inhale 2 puffs into the lungs 4 times daily, Disp: 1 Inhaler, Rfl: 5    ferrous sulfate (IRON 325) 325 (65 Fe) MG tablet, Take 1 tablet by mouth daily (with breakfast), Disp: 90 tablet, Rfl: 5    levETIRAcetam (KEPPRA) 1000 MG tablet, Take 1 tablet by mouth 2 times daily, Disp: 60 tablet, Rfl: 5    dicyclomine (BENTYL) 10 MG capsule, Take 1 capsule by mouth 3 times daily as needed (abdominal pain), Disp: 21 capsule, Rfl: 3    oxyCODONE-acetaminophen (PERCOCET) 5-325 MG per tablet, Take 1 tablet by mouth. Every 4-6 hours PRN, Disp: , Rfl:     Cholecalciferol (VITAMIN D3) 5000 units TABS, Take 1 tablet by mouth daily, Disp: , Rfl:     estradiol (ESTRACE) 0.5 MG tablet, Take 0.5 mg by mouth daily, Disp: , Rfl:     atenolol (TENORMIN) 25 MG tablet, Take 25 mg by mouth daily, Disp: , Rfl:     Multiple Vitamin (MVI, BARIATRIC ADVANTAGE MULTI-FORMULA, CHEW TAB), Take 1 tablet by mouth daily, Disp: 30 tablet, Rfl: 5    Calcium Citrate-Vitamin D (CALCIUM + VIT D, BARIATRIC ADVANTAGE, CHEWABLE TABLET), Take 1 tablet by mouth 2 times daily, Disp: 120 tablet, Rfl: 5    levothyroxine (SYNTHROID) 125 MCG tablet, Take 1 tablet by mouth Daily (Patient taking differently: Take 125 mcg by mouth nightly ), Disp: 30 tablet, Rfl: 0    gabapentin (NEURONTIN) 800 MG tablet, Take 800 mg by mouth 3 times daily, Disp: , Rfl:     PARoxetine (PAXIL) 30 MG tablet, Take 50 mg by mouth nightly , Disp: , Rfl:   Past Medical History:   Diagnosis Date    Acid reflux     Anemia     Anxiety     Arthritis     Hands, Back And Ankles    Bleeding ulcer 2014    \"I Had Ulcers In My Stomach And Colon\"/ per pt on 8/12/2019\"they said recently having some blood in my stomach- in July ( 2019)could not find where coming from \"    Bronchitis Last Episode 2014    Chronic back pain     [facility]     [facility]    [facility] obstructive pulmonary disease) [facility])     Sees Dr. Dennie Clubs Depression     Fibromyalgia Dx 2013    GERD (gastroesophageal reflux disease)     H/O echocardiogram 08/11/2015    EF >55%. LA to be at the upper limit of normal in size.  LV hypertrophy with normal LV systolic, but abnormal diastolic function. Normal valvular structures and function.  H/O echocardiogram 08/30/2018    EF 55-60%    Hiatal hernia     History of blood transfusion 09/2015 And 2018    No Reaction To Blood Transfusions Received    History of sleeve gastrectomy     [place] (hard of hearing)     Right Ear    Hx of cardiac catheterization 10/24/2010    Angiographically normal coronary arteries w/ normal LV function and wall motion.  Hx of transesophageal echocardiography (PILO) for monitoring 12/02/2010    EF 55-60%. WNL.    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 name   Hoa Madrigal cysts     \"they found that I have a kidney cyst but not sure which side\" per pt on 7/15/2020    Lupus ([address] Dx 2013    \"Rheumatoid Lupus\"    MDRO (multiple drug resistant organisms) resistance     4 months ago chest from mediport    Morbid obesity ([address]     MRSA bacteremia     Nausea     \"Most Of The Time\"    Osteomyelitis of fifth toe of left foot ([address]     Pain management     Sees Dr. Khai George, Once A Month    Pancreatitis chronic Dx 2001    Pneumonia Dx 11-15    Seizures ([address]     Shortness of breath on exertion     Shortness of breath on exertion     Sleep apnea     Has CPAP\"no longer use the cpap since lost weight\"    Staph infection Dx 11-15    Left Foot    Staph infection Dx 11-15    \"Left Foot\"    Teeth missing     Upper And Lower    Thyroid disease     Wears glasses     To Read      Past Surgical History:   Procedure Laterality Date    APPENDECTOMY  02/1998    Done When Tubes And Ovaries Were Removed    CARDIAC CATHETERIZATION  10/24/2010    CATHETER REMOVAL N/A 7/16/2019    PORT REMOVAL performed by Zulema Diaz MD at [address]  08/27/1991    CHOLECYSTECTOMY, LAPAROSCOPIC  1990's    COLONOSCOPY  Last Done In 2000's    DENTAL SURGERY      Teeth Extracted In Past    ENDOSCOPY, COLON, DIAGNOSTIC  Last Done In 2018    FOOT DEBRIDEMENT Left 6/16/2019    FIRST METATARSAL DEBRIDEMENT INCISION AND DRAINAGE. EXCISION OF ULCER.  RESECTION OF BONE. 1ST METATARSAL POWER LAVAGE AND BONE CEMENT performed by Xavier Foote DPM at [address] Left Last Done In 2016     Dr. Castro Speaker, \" About 6 Surgeries Done Because Of Staph Infection\"    HIATAL HERNIA REPAIR N/A 2/12/2019    HERNIA HIATAL LAPAROSCOPIC ROBOTIC performed by Zulema Diaz MD at [facility]  10/1997    Partial Abdominal Hysterectomy    INSERTION / REMOVAL / REPLACEMENT VENOUS ACCESS CATHETER N/A 8/15/2019    PORT INSERTION performed by Zulema Diaz MD at [address] [facility]    removed after 6 months    LEG BIOPSY EXCISION Left 3/8/2021    LEFT THIGH LESION BIOPSY EXCISION performed by Zulema Diaz MD at [place] [address], PARTIAL Left 2008    Benign    OTHER SURGICAL HISTORY  02/1998    \"Tubes And Ovaries Removed, Appendectomy Also Done\"    OTHER SURGICAL HISTORY  Last Done 7-15-16    Mediport Insertion \"Total Of Six Done, Removed Last Mediport In 2014\"    PORT SURGERY N/A 1/15/2020    PORT REMOVAL performed by Zulema Diaz MD at [address] N/A 7/6/2020    PORT INSERTION performed by Zulema Diaz MD at [address] N/A 2/12/2019    GASTRECTOMY SLEEVE LAPAROSCOPIC ROBOTIC performed by Zulema Diaz MD at [address]  08/27/2018    UPPER GASTROINTESTINAL ENDOSCOPY N/A 4/2/2019    EGD CONTROL HEMORRHAGE with epi injection at bleeding site performed by Zulema Diaz MD at [address] 6/19/2019    EGD DIAGNOSTIC ONLY performed by Thais Do MD at [address] N/A 7/22/2019    EGD DIAGNOSTIC ONLY [facility] by Jagjit Granados MD [address] N/A 10/14/2019    EGD DIAGNOSTIC ONLY performed by Thais Do MD at [address] N/A 7/17/2020    EGJ DILATATION BALLOON WITH 18-20 MM BALLOON, DILATED TO 20 MM. performed by Thais Do MD at [address] History     Socioeconomic History    Marital status:      Spouse name: None    Number of children: None    Years of education: None    Highest education level: None   Occupational History    None   Social Needs    Financial resource strain: None    Food insecurity     Worry: None     Inability: None    Transportation needs     Medical: None     Non-medical: None   Tobacco Use    Smoking status: Never Smoker    Smokeless tobacco: Never Used   Substance and Sexual Activity    Alcohol use: No     Alcohol/week: 0.0 standard drinks    Drug use: No    Sexual activity: Not Currently   Lifestyle    Physical activity     Days per week: None     Minutes per session: None    Stress: None   Relationships    Social connections     Talks on phone: None     Gets together: None     Attends Rastafari service: None     Active member of club or organization: None     Attends meetings of clubs or organizations: None     Relationship status: None    Intimate partner violence     Fear of current or ex partner: None     Emotionally abused: None     Physically abused: None     Forced sexual activity: None   Other Topics Concern    None   Social History Narrative    None     Family History   Problem Relation Age of Onset    Diabetes Mother         \"Borderline Diabetes\"    High Blood Pressure Mother     Obesity Mother     Arthritis Mother     Heart Disease Mother     High Cholesterol Mother     Vision Loss Mother     JVD.      Trachea: No tracheal deviation. Cardiovascular:      Rate and Rhythm: Normal rate and regular rhythm. Heart sounds: Normal heart sounds. No murmur. No friction rub. No gallop. Pulmonary:      Effort: Pulmonary effort is normal. No respiratory distress. Breath sounds: No stridor. No wheezing or rales. Chest:      Chest wall: No tenderness. Abdominal:      General: Bowel sounds are normal. There is no distension. Palpations: Abdomen is soft. There is no mass. Tenderness: There is no abdominal tenderness. There is no guarding or rebound. Musculoskeletal: Normal range of motion. General: No tenderness. Lymphadenopathy:      Cervical: No cervical adenopathy. Skin:     General: Skin is warm and dry. Coloration: Skin is not pale. Findings: No erythema or rash. Neurological:      Mental Status: She is alert and oriented to person, place, and time. Cranial Nerves: No cranial nerve deficit. Coordination: Coordination normal.   Psychiatric:         Behavior: Behavior normal.         Thought Content: Thought content normal.         Judgment: Judgment normal.         No orders of the defined types were placed in this encounter. Assessment / Plan:    1. Status post laparoscopic sleeve gastrectomy    2. Morbid obesity with BMI of 45.0-49.9, adult [facility])      Path  Final Pathologic Diagnosis:   Skin and subcutaneous tissue, left thigh, excision:   -  Ulceration, acute inflammation, and necrosis (see stains   report). -  Acid fast bacilli and fungi are not identified. Electronically Signed Out By Tabitha Guzman. Pineda Louis MD    Needs to loose more weight. .. slimfast will do. .    Follow Up:  Return in about 4 weeks (around 4/14/2021) for Bariatric follow up: diet, exercise & weight loss, Follow up Symptoms.     Taisha Mccullough MD, FACS, FICS  Member of the [employer] of Metabolic and Bariatric Surgeons    [phone]    3/17/21

## 2021-03-22 ENCOUNTER — OFFICE VISIT (OUTPATIENT)
Dept: PRIMARY CARE CLINIC | Age: 51
End: 2021-03-22
Payer: COMMERCIAL

## 2021-03-22 VITALS
OXYGEN SATURATION: 98 % | SYSTOLIC BLOOD PRESSURE: 120 MMHG | WEIGHT: 275 LBS | DIASTOLIC BLOOD PRESSURE: 88 MMHG | BODY MASS INDEX: 46.95 KG/M2 | HEART RATE: 66 BPM | TEMPERATURE: 97.2 F | HEIGHT: 64 IN

## 2021-03-22 DIAGNOSIS — Z98.84 STATUS POST LAPAROSCOPIC SLEEVE GASTRECTOMY: ICD-10-CM

## 2021-03-22 DIAGNOSIS — F41.9 ANXIETY: ICD-10-CM

## 2021-03-22 DIAGNOSIS — R42 DIZZINESS: ICD-10-CM

## 2021-03-22 DIAGNOSIS — L03.116 CELLULITIS OF LEFT THIGH: Primary | ICD-10-CM

## 2021-03-22 DIAGNOSIS — G89.4 CHRONIC PAIN SYNDROME: ICD-10-CM

## 2021-03-22 DIAGNOSIS — R56.9 SEIZURE (HCC): ICD-10-CM

## 2021-03-22 DIAGNOSIS — R10.84 GENERALIZED ABDOMINAL PAIN: ICD-10-CM

## 2021-03-22 PROCEDURE — 99495 TRANSJ CARE MGMT MOD F2F 14D: CPT | Performed by: NURSE PRACTITIONER

## 2021-03-22 PROCEDURE — 1111F DSCHRG MED/CURRENT MED MERGE: CPT | Performed by: NURSE PRACTITIONER

## 2021-03-22 RX ORDER — MECLIZINE HCL 12.5 MG/1
12.5 TABLET ORAL 3 TIMES DAILY PRN
Qty: 15 TABLET | Refills: 0 | Status: SHIPPED | OUTPATIENT
Start: 2021-03-22 | End: 2021-04-07 | Stop reason: SDUPTHER

## 2021-03-22 RX ORDER — HYDROXYZINE PAMOATE 50 MG/1
50 CAPSULE ORAL EVERY 6 HOURS PRN
Qty: 20 CAPSULE | Refills: 0 | Status: SHIPPED | OUTPATIENT
Start: 2021-03-22 | End: 2021-04-07 | Stop reason: SDUPTHER

## 2021-03-22 ASSESSMENT — ENCOUNTER SYMPTOMS
NAUSEA: 1
SHORTNESS OF BREATH: 1
DIARRHEA: 1
ABDOMINAL PAIN: 1

## 2021-03-23 ASSESSMENT — ENCOUNTER SYMPTOMS
SINUS PRESSURE: 0
COUGH: 0
SINUS PAIN: 0
RHINORRHEA: 0
VOMITING: 1
SORE THROAT: 0
WHEEZING: 0
CHEST TIGHTNESS: 0

## 2021-03-29 ENCOUNTER — APPOINTMENT (OUTPATIENT)
Dept: ULTRASOUND IMAGING | Age: 51
End: 2021-03-29
Payer: COMMERCIAL

## 2021-03-29 ENCOUNTER — HOSPITAL ENCOUNTER (EMERGENCY)
Age: 51
Discharge: HOME | End: 2021-03-30
Attending: EMERGENCY MEDICINE
Payer: COMMERCIAL

## 2021-03-29 ENCOUNTER — APPOINTMENT (OUTPATIENT)
Dept: CT IMAGING | Age: 51
End: 2021-03-29
Payer: COMMERCIAL

## 2021-03-29 ENCOUNTER — APPOINTMENT (OUTPATIENT)
Dept: GENERAL RADIOLOGY | Age: 51
End: 2021-03-29
Payer: COMMERCIAL

## 2021-03-29 DIAGNOSIS — R11.2 NAUSEA AND VOMITING, INTRACTABILITY OF VOMITING NOT SPECIFIED, UNSPECIFIED VOMITING TYPE: ICD-10-CM

## 2021-03-29 DIAGNOSIS — R10.9 ABDOMINAL PAIN, UNSPECIFIED ABDOMINAL LOCATION: Primary | ICD-10-CM

## 2021-03-29 DIAGNOSIS — R10.9 CHRONIC ABDOMINAL PAIN: ICD-10-CM

## 2021-03-29 DIAGNOSIS — M79.604 RIGHT LEG PAIN: ICD-10-CM

## 2021-03-29 DIAGNOSIS — G89.29 CHRONIC ABDOMINAL PAIN: ICD-10-CM

## 2021-03-29 LAB
ALBUMIN SERPL-MCNC: 4 GM/DL (ref 3.4–5)
ALP BLD-CCNC: 93 IU/L (ref 40–129)
ALT SERPL-CCNC: 12 U/L (ref 10–40)
AMPHETAMINES: NEGATIVE
ANION GAP SERPL CALCULATED.3IONS-SCNC: 13 MMOL/L (ref 4–16)
AST SERPL-CCNC: 16 IU/L (ref 15–37)
BACTERIA: NEGATIVE /HPF
BARBITURATE SCREEN URINE: NEGATIVE
BASOPHILS ABSOLUTE: 0.1 K/CU MM
BASOPHILS RELATIVE PERCENT: 0.9 % (ref 0–1)
BENZODIAZEPINE SCREEN, URINE: NEGATIVE
BILIRUB SERPL-MCNC: 0.3 MG/DL (ref 0–1)
BILIRUBIN URINE: NEGATIVE MG/DL
BLOOD, URINE: NEGATIVE
BUN BLDV-MCNC: 15 MG/DL (ref 6–23)
CALCIUM SERPL-MCNC: 10.3 MG/DL (ref 8.3–10.6)
CANNABINOID SCREEN URINE: NEGATIVE
CHLORIDE BLD-SCNC: 105 MMOL/L (ref 99–110)
CLARITY: CLEAR
CO2: 22 MMOL/L (ref 21–32)
COCAINE METABOLITE: NEGATIVE
COLOR: ABNORMAL
CREAT SERPL-MCNC: 0.8 MG/DL (ref 0.6–1.1)
DIFFERENTIAL TYPE: ABNORMAL
EOSINOPHILS ABSOLUTE: 0.2 K/CU MM
EOSINOPHILS RELATIVE PERCENT: 3.7 % (ref 0–3)
GFR AFRICAN AMERICAN: >60 ML/MIN/1.73M2
GFR NON-AFRICAN AMERICAN: >60 ML/MIN/1.73M2
GLUCOSE BLD-MCNC: 92 MG/DL (ref 70–99)
GLUCOSE, URINE: NEGATIVE MG/DL
HCT VFR BLD CALC: 35.3 % (ref 37–47)
HEMOGLOBIN: 11.3 GM/DL (ref 12.5–16)
IMMATURE NEUTROPHIL %: 0.2 % (ref 0–0.43)
KETONES, URINE: NEGATIVE MG/DL
LACTATE: 2.7 MMOL/L (ref 0.4–2)
LEUKOCYTE ESTERASE, URINE: NEGATIVE
LIPASE: 19 IU/L (ref 13–60)
LYMPHOCYTES ABSOLUTE: 2.2 K/CU MM
LYMPHOCYTES RELATIVE PERCENT: 39.8 % (ref 24–44)
MCH RBC QN AUTO: 25.9 PG (ref 27–31)
MCHC RBC AUTO-ENTMCNC: 32 % (ref 32–36)
MCV RBC AUTO: 81 FL (ref 78–100)
MONOCYTES ABSOLUTE: 0.4 K/CU MM
MONOCYTES RELATIVE PERCENT: 6.6 % (ref 0–4)
MUCUS: ABNORMAL HPF
NITRITE URINE, QUANTITATIVE: NEGATIVE
NUCLEATED RBC %: 0 %
OPIATES, URINE: NEGATIVE
OXYCODONE: ABNORMAL
PDW BLD-RTO: 14.1 % (ref 11.7–14.9)
PH, URINE: 5 (ref 5–8)
PHENCYCLIDINE, URINE: NEGATIVE
PLATELET # BLD: 197 K/CU MM (ref 140–440)
PMV BLD AUTO: 10.7 FL (ref 7.5–11.1)
POTASSIUM SERPL-SCNC: 4.1 MMOL/L (ref 3.5–5.1)
PROTEIN UA: NEGATIVE MG/DL
RBC # BLD: 4.36 M/CU MM (ref 4.2–5.4)
RBC URINE: <1 /HPF (ref 0–6)
SEGMENTED NEUTROPHILS ABSOLUTE COUNT: 2.8 K/CU MM
SEGMENTED NEUTROPHILS RELATIVE PERCENT: 48.8 % (ref 36–66)
SODIUM BLD-SCNC: 140 MMOL/L (ref 135–145)
SPECIFIC GRAVITY UA: 1.01 (ref 1–1.03)
SQUAMOUS EPITHELIAL: <1 /HPF
TOTAL IMMATURE NEUTOROPHIL: 0.01 K/CU MM
TOTAL NUCLEATED RBC: 0 K/CU MM
TOTAL PROTEIN: 6.8 GM/DL (ref 6.4–8.2)
TRICHOMONAS: ABNORMAL /HPF
TROPONIN T: <0.01 NG/ML
UROBILINOGEN, URINE: NEGATIVE MG/DL (ref 0.2–1)
WBC # BLD: 5.6 K/CU MM (ref 4–10.5)
WBC UA: ABNORMAL /HPF (ref 0–5)

## 2021-03-29 PROCEDURE — 6360000002 HC RX W HCPCS: Performed by: PHYSICIAN ASSISTANT

## 2021-03-29 PROCEDURE — 93971 EXTREMITY STUDY: CPT

## 2021-03-29 PROCEDURE — 83605 ASSAY OF LACTIC ACID: CPT

## 2021-03-29 PROCEDURE — 83690 ASSAY OF LIPASE: CPT

## 2021-03-29 PROCEDURE — 80307 DRUG TEST PRSMV CHEM ANLYZR: CPT

## 2021-03-29 PROCEDURE — 81001 URINALYSIS AUTO W/SCOPE: CPT

## 2021-03-29 PROCEDURE — 6360000002 HC RX W HCPCS: Performed by: EMERGENCY MEDICINE

## 2021-03-29 PROCEDURE — 96372 THER/PROPH/DIAG INJ SC/IM: CPT

## 2021-03-29 PROCEDURE — 85025 COMPLETE CBC W/AUTO DIFF WBC: CPT

## 2021-03-29 PROCEDURE — 71045 X-RAY EXAM CHEST 1 VIEW: CPT

## 2021-03-29 PROCEDURE — 96376 TX/PRO/DX INJ SAME DRUG ADON: CPT

## 2021-03-29 PROCEDURE — 84484 ASSAY OF TROPONIN QUANT: CPT

## 2021-03-29 PROCEDURE — 96375 TX/PRO/DX INJ NEW DRUG ADDON: CPT

## 2021-03-29 PROCEDURE — 80053 COMPREHEN METABOLIC PANEL: CPT

## 2021-03-29 PROCEDURE — 96374 THER/PROPH/DIAG INJ IV PUSH: CPT

## 2021-03-29 PROCEDURE — 2580000003 HC RX 258: Performed by: PHYSICIAN ASSISTANT

## 2021-03-29 PROCEDURE — 99285 EMERGENCY DEPT VISIT HI MDM: CPT

## 2021-03-29 PROCEDURE — 93005 ELECTROCARDIOGRAM TRACING: CPT | Performed by: PHYSICIAN ASSISTANT

## 2021-03-29 PROCEDURE — 74177 CT ABD & PELVIS W/CONTRAST: CPT

## 2021-03-29 PROCEDURE — 6360000004 HC RX CONTRAST MEDICATION: Performed by: EMERGENCY MEDICINE

## 2021-03-29 RX ORDER — PROMETHAZINE HYDROCHLORIDE 25 MG/ML
25 INJECTION, SOLUTION INTRAMUSCULAR; INTRAVENOUS ONCE
Status: COMPLETED | OUTPATIENT
Start: 2021-03-29 | End: 2021-03-29

## 2021-03-29 RX ORDER — HALOPERIDOL 5 MG/ML
5 INJECTION INTRAMUSCULAR ONCE
Status: COMPLETED | OUTPATIENT
Start: 2021-03-29 | End: 2021-03-29

## 2021-03-29 RX ORDER — MORPHINE SULFATE 4 MG/ML
4 INJECTION, SOLUTION INTRAMUSCULAR; INTRAVENOUS ONCE
Status: COMPLETED | OUTPATIENT
Start: 2021-03-29 | End: 2021-03-29

## 2021-03-29 RX ORDER — DIPHENHYDRAMINE HYDROCHLORIDE 50 MG/ML
50 INJECTION INTRAMUSCULAR; INTRAVENOUS ONCE
Status: COMPLETED | OUTPATIENT
Start: 2021-03-29 | End: 2021-03-29

## 2021-03-29 RX ORDER — 0.9 % SODIUM CHLORIDE 0.9 %
1000 INTRAVENOUS SOLUTION INTRAVENOUS ONCE
Status: COMPLETED | OUTPATIENT
Start: 2021-03-29 | End: 2021-03-29

## 2021-03-29 RX ADMIN — SODIUM CHLORIDE 1000 ML: 9 INJECTION, SOLUTION INTRAVENOUS at 20:16

## 2021-03-29 RX ADMIN — DIPHENHYDRAMINE HYDROCHLORIDE 50 MG: 50 INJECTION INTRAMUSCULAR; INTRAVENOUS at 21:57

## 2021-03-29 RX ADMIN — MORPHINE SULFATE 4 MG: 4 INJECTION, SOLUTION INTRAMUSCULAR; INTRAVENOUS at 21:57

## 2021-03-29 RX ADMIN — IOPAMIDOL 75 ML: 755 INJECTION, SOLUTION INTRAVENOUS at 21:44

## 2021-03-29 RX ADMIN — MORPHINE SULFATE 4 MG: 4 INJECTION, SOLUTION INTRAMUSCULAR; INTRAVENOUS at 20:11

## 2021-03-29 RX ADMIN — HALOPERIDOL LACTATE 5 MG: 5 INJECTION, SOLUTION INTRAMUSCULAR at 21:04

## 2021-03-29 RX ADMIN — PROMETHAZINE HYDROCHLORIDE 25 MG: 25 INJECTION INTRAMUSCULAR; INTRAVENOUS at 19:53

## 2021-03-29 ASSESSMENT — PAIN SCALES - GENERAL
PAINLEVEL_OUTOF10: 10
PAINLEVEL_OUTOF10: 10

## 2021-03-29 NOTE — CARE COORDINATION
CM review of pt chart for readmission risk, last 3/3-3/8/21 w/c/o left thigh cellulitis, IV antibiotics,chronic abd pain N/V discharged home with antibiotics, o/p follow up. Pt returns to ER today with c/o leg pain, abs pain w/nausea. Pt has had resent left thigh cellulitis, Dr Richad Gottron gen performed Lesion Biopsy Excision,  Along  with antibiotic therapy. On going hx of Abd pain, N/V-hx of gastric bypass/GERD/chronic pancreatitis, many admissions with chronic abd issues. Pt is from home , Jose Coffey, PCP Dr Bonita Parra. 12 Pt still pending POC.  VANESSA,RN/CM

## 2021-03-29 NOTE — ED PROVIDER NOTES
I independently examined and evaluated Andre Pope. In brief their history revealed abdominal pain, epigastric, nausea, vomiting.right leg pain for few days. Recent admit for abd pain and left thigh infection. . Their exam revealed female who is awake, alert, oriented x4 with elevated BMI. Mild epigastric tenderness. Lower extremities were not red warm no rash. Soft compartments. Speaks in full sentences. . All diagnostic, treatment, and disposition decisions were made by myself in conjunction with the mid-level provider. Before disposition, questions were sought and answered with the patient. They have voiced understanding and agree with the plan: Patient's vital signs are stable. Doppler studies negative for DVT. Chest x-ray does not show any acute findings. She has been ordered to have nausea medication, pain medication, IV fluids. . CBC shows a white count of 5.6. Hemoglobin of 11.3. Electrolytes are normal.  Lipase normal.  Opponent is normal.  Urine drug screen is positive for oxycodone. Lactic acid is elevated at 2.7. Urinalysis is clear. CT abdomen shows no acute intra-abdominal abnormality. Status post cholecystectomy, hysterectomy, sleeve gastrectomy. Left renal angiomyolipoma. . Patient awaiting repeat lactic acid but states she would like to go home. She states she has all of her nausea medications at home. Has pain medicine at home. Denies wanting to be admitted for nausea and abdominal pain control. We will set her up for discharge. To new home meds. Follow-up with her bariatric doctor as she states she has a scope coming up. Return to ED if worsens    For all further details of the patient's emergency department visit, please see the mid-level practitioner's documentation.        Imaging Results     CT ABDOMEN PELVIS W IV CONTRAST Additional Contrast? None (Preliminary result)  Result time 03/29/21 22:12:09  Preliminary result by Codie Mckenna MD (03/29/21 22:12:09) Security returned patient cards and money. Patient verified. Money/cards then put in with patient belongings.    Impression:    1. No acute intra-abdominal abnormality. 2. Status post cholecystectomy, hysterectomy, and sleeve gastrectomy. 3. Left renal angiomyolipoma.                       VL DUP LOWER EXTREMITY VENOUS RIGHT (Final result)  Result time 03/29/21 18:36:57  Final result by Rylan May MD (03/29/21 18:36:57)                Impression:    No evidence of DVT in the right lower extremity. Narrative:    EXAMINATION:   DUPLEX VENOUS ULTRASOUND OF THE RIGHT LOWER EXTREMITY, 3/29/2021 6:02 pm     TECHNIQUE:   Duplex ultrasound using B-mode/gray scaled imaging and Doppler spectral   analysis and color flow was obtained of the right lower extremity. COMPARISON:   None. HISTORY:   ORDERING SYSTEM PROVIDED HISTORY: thigh pain nki     Acuity: Acute     FINDINGS:   The visualized veins of the right lower extremity are patent and free of   echogenic thrombus. The veins demonstrate good compressibility with normal   color flow study and spectral analysis.         XR CHEST PORTABLE (Preliminary result)  Result time 03/29/21 18:05:14  Preliminary result by Sandra Raygoza MD (03/29/21 18:05:14)                Impression:    No acute cardiopulmonary process. Narrative:    EXAMINATION:   ONE XRAY VIEW OF THE CHEST     3/29/2021 5:42 pm     COMPARISON:   03/04/2021     HISTORY:   ORDERING SYSTEM PROVIDED HISTORY: cough   TECHNOLOGIST PROVIDED HISTORY:   Reason for exam:->cough   Reason for Exam: abd pain   nausea    cough   Acuity: Unknown   Type of Exam: Unknown     Initial evaluation     FINDINGS:   The trachea is midline.  The cardiac silhouette is unremarkable. The lungs   are clear without evidence of infiltrate, mass, or pneumothorax. Rosana Feast is no   pleural fluid.  The patient has a MediPort catheter with a slight kink at the   insertion site which is unchanged.                 Preliminary result by Sandra Raygoza MD (03/29/21 18:03:45)                Impression: No acute cardiopulmonary process.                     Rosalinda Jett MD  03/30/21 Kavin Lou

## 2021-03-30 VITALS
HEART RATE: 64 BPM | WEIGHT: 272 LBS | HEIGHT: 64 IN | DIASTOLIC BLOOD PRESSURE: 72 MMHG | OXYGEN SATURATION: 94 % | RESPIRATION RATE: 18 BRPM | SYSTOLIC BLOOD PRESSURE: 120 MMHG | TEMPERATURE: 98.2 F | BODY MASS INDEX: 46.44 KG/M2

## 2021-03-30 LAB — LACTATE: 0.6 MMOL/L (ref 0.4–2)

## 2021-03-30 PROCEDURE — 6370000000 HC RX 637 (ALT 250 FOR IP): Performed by: EMERGENCY MEDICINE

## 2021-03-30 PROCEDURE — 6360000002 HC RX W HCPCS: Performed by: EMERGENCY MEDICINE

## 2021-03-30 PROCEDURE — 93010 ELECTROCARDIOGRAM REPORT: CPT | Performed by: INTERNAL MEDICINE

## 2021-03-30 RX ORDER — HEPARIN SODIUM (PORCINE) LOCK FLUSH IV SOLN 100 UNIT/ML 100 UNIT/ML
100 SOLUTION INTRAVENOUS ONCE
Status: COMPLETED | OUTPATIENT
Start: 2021-03-30 | End: 2021-03-30

## 2021-03-30 RX ORDER — ONDANSETRON 2 MG/ML
4 INJECTION INTRAMUSCULAR; INTRAVENOUS ONCE
Status: COMPLETED | OUTPATIENT
Start: 2021-03-30 | End: 2021-03-30

## 2021-03-30 RX ORDER — OXYCODONE HYDROCHLORIDE AND ACETAMINOPHEN 5; 325 MG/1; MG/1
1 TABLET ORAL ONCE
Status: COMPLETED | OUTPATIENT
Start: 2021-03-30 | End: 2021-03-30

## 2021-03-30 RX ADMIN — Medication 100 UNITS: at 01:13

## 2021-03-30 RX ADMIN — OXYCODONE HYDROCHLORIDE AND ACETAMINOPHEN 1 TABLET: 5; 325 TABLET ORAL at 00:38

## 2021-03-30 RX ADMIN — ONDANSETRON 4 MG: 2 INJECTION INTRAMUSCULAR; INTRAVENOUS at 00:38

## 2021-03-30 NOTE — ED NOTES
Repeat lactic collected and sent to lab. Pt is resting comfortably and denies any further needs at this time.       Juana Mak RN  03/29/21 [id number]

## 2021-03-30 NOTE — ED NOTES
Reviewed discharge paperwork with pt. Answered all questions. Pt verbalized understanding.         Sailaja Arzola, JACKIE  03/30/21 [id number]

## 2021-04-02 ENCOUNTER — HOSPITAL ENCOUNTER (OUTPATIENT)
Dept: INFUSION THERAPY | Age: 51
Discharge: HOME | End: 2021-04-02
Payer: COMMERCIAL

## 2021-04-02 ENCOUNTER — OFFICE VISIT (OUTPATIENT)
Dept: ONCOLOGY | Age: 51
End: 2021-04-02
Payer: COMMERCIAL

## 2021-04-02 VITALS
HEIGHT: 64 IN | WEIGHT: 276 LBS | BODY MASS INDEX: 47.12 KG/M2 | OXYGEN SATURATION: 97 % | SYSTOLIC BLOOD PRESSURE: 122 MMHG | DIASTOLIC BLOOD PRESSURE: 58 MMHG | TEMPERATURE: 96.1 F | HEART RATE: 56 BPM

## 2021-04-02 DIAGNOSIS — D50.0 IRON DEFICIENCY ANEMIA SECONDARY TO BLOOD LOSS (CHRONIC): ICD-10-CM

## 2021-04-02 DIAGNOSIS — D50.0 IRON DEFICIENCY ANEMIA SECONDARY TO BLOOD LOSS (CHRONIC): Primary | ICD-10-CM

## 2021-04-02 LAB
ALBUMIN SERPL-MCNC: 4.2 GM/DL (ref 3.4–5)
ALP BLD-CCNC: 92 IU/L (ref 40–128)
ALT SERPL-CCNC: 14 U/L (ref 10–40)
ANION GAP SERPL CALCULATED.3IONS-SCNC: 6 MMOL/L (ref 4–16)
AST SERPL-CCNC: 17 IU/L (ref 15–37)
BASOPHILS ABSOLUTE: 0 K/CU MM
BASOPHILS RELATIVE PERCENT: 0.5 % (ref 0–1)
BILIRUB SERPL-MCNC: 0.2 MG/DL (ref 0–1)
BUN BLDV-MCNC: 12 MG/DL (ref 6–23)
CALCIUM SERPL-MCNC: 9.9 MG/DL (ref 8.3–10.6)
CHLORIDE BLD-SCNC: 108 MMOL/L (ref 99–110)
CO2: 27 MMOL/L (ref 21–32)
CREAT SERPL-MCNC: 0.8 MG/DL (ref 0.6–1.1)
DIFFERENTIAL TYPE: ABNORMAL
EOSINOPHILS ABSOLUTE: 0.2 K/CU MM
EOSINOPHILS RELATIVE PERCENT: 5.6 % (ref 0–3)
FERRITIN: 19 NG/ML (ref 15–150)
GFR AFRICAN AMERICAN: >60 ML/MIN/1.73M2
GFR NON-AFRICAN AMERICAN: >60 ML/MIN/1.73M2
GLUCOSE BLD-MCNC: 87 MG/DL (ref 70–99)
HCT VFR BLD CALC: 33.8 % (ref 37–47)
HEMOGLOBIN: 10.9 GM/DL (ref 12.5–16)
IRON: 33 UG/DL (ref 37–145)
LYMPHOCYTES ABSOLUTE: 1.6 K/CU MM
LYMPHOCYTES RELATIVE PERCENT: 41.2 % (ref 24–44)
MCH RBC QN AUTO: 25.3 PG (ref 27–31)
MCHC RBC AUTO-ENTMCNC: 32.2 % (ref 32–36)
MCV RBC AUTO: 78.4 FL (ref 78–100)
MONOCYTES ABSOLUTE: 0.3 K/CU MM
MONOCYTES RELATIVE PERCENT: 6.9 % (ref 0–4)
PCT TRANSFERRIN: 9 % (ref 10–44)
PDW BLD-RTO: 14.9 % (ref 11.7–14.9)
PLATELET # BLD: 184 K/CU MM (ref 140–440)
PMV BLD AUTO: 10 FL (ref 7.5–11.1)
POTASSIUM SERPL-SCNC: 4.5 MMOL/L (ref 3.5–5.1)
RBC # BLD: 4.31 M/CU MM (ref 4.2–5.4)
SEGMENTED NEUTROPHILS ABSOLUTE COUNT: 1.7 K/CU MM
SEGMENTED NEUTROPHILS RELATIVE PERCENT: 45.8 % (ref 36–66)
SODIUM BLD-SCNC: 141 MMOL/L (ref 135–145)
TOTAL IRON BINDING CAPACITY: 375 UG/DL (ref 250–450)
TOTAL PROTEIN: 6.8 GM/DL (ref 6.4–8.2)
UNSATURATED IRON BINDING CAPACITY: 342 UG/DL (ref 110–370)
WBC # BLD: 3.8 K/CU MM (ref 4–10.5)

## 2021-04-02 PROCEDURE — G8417 CALC BMI ABV UP PARAM F/U: HCPCS | Performed by: NURSE PRACTITIONER

## 2021-04-02 PROCEDURE — 83540 ASSAY OF IRON: CPT

## 2021-04-02 PROCEDURE — 1036F TOBACCO NON-USER: CPT | Performed by: NURSE PRACTITIONER

## 2021-04-02 PROCEDURE — 83550 IRON BINDING TEST: CPT

## 2021-04-02 PROCEDURE — 3017F COLORECTAL CA SCREEN DOC REV: CPT | Performed by: NURSE PRACTITIONER

## 2021-04-02 PROCEDURE — 1111F DSCHRG MED/CURRENT MED MERGE: CPT | Performed by: NURSE PRACTITIONER

## 2021-04-02 PROCEDURE — 99213 OFFICE O/P EST LOW 20 MIN: CPT | Performed by: NURSE PRACTITIONER

## 2021-04-02 PROCEDURE — 85025 COMPLETE CBC W/AUTO DIFF WBC: CPT

## 2021-04-02 PROCEDURE — G8427 DOCREV CUR MEDS BY ELIG CLIN: HCPCS | Performed by: NURSE PRACTITIONER

## 2021-04-02 PROCEDURE — 82728 ASSAY OF FERRITIN: CPT

## 2021-04-02 PROCEDURE — 80053 COMPREHEN METABOLIC PANEL: CPT

## 2021-04-02 PROCEDURE — 99211 OFF/OP EST MAY X REQ PHY/QHP: CPT

## 2021-04-02 PROCEDURE — 36415 COLL VENOUS BLD VENIPUNCTURE: CPT

## 2021-04-02 RX ORDER — ONDANSETRON 4 MG/1
TABLET, ORALLY DISINTEGRATING ORAL
COMMUNITY
Start: 2021-03-26 | End: 2021-07-29

## 2021-04-02 RX ORDER — SCOLOPAMINE TRANSDERMAL SYSTEM 1 MG/1
PATCH, EXTENDED RELEASE TRANSDERMAL
COMMUNITY
Start: 2021-01-04 | End: 2021-07-04 | Stop reason: SDUPTHER

## 2021-04-02 RX ORDER — BISMUTH SUBSALICYLATE 262 MG/1
TABLET, CHEWABLE ORAL
COMMUNITY
Start: 2021-03-26 | End: 2021-07-30 | Stop reason: ALTCHOICE

## 2021-04-02 RX ORDER — CLONIDINE HYDROCHLORIDE 0.1 MG/1
TABLET ORAL
COMMUNITY
Start: 2021-01-04 | End: 2021-05-12 | Stop reason: SDUPTHER

## 2021-04-02 ASSESSMENT — PATIENT HEALTH QUESTIONNAIRE - PHQ9
1. LITTLE INTEREST OR PLEASURE IN DOING THINGS: 0
SUM OF ALL RESPONSES TO PHQ QUESTIONS 1-9: 1

## 2021-04-02 NOTE — PROGRESS NOTES
MA Rooming Questions  Patient: Dwight Ybarra  MRN: [id number]    Date: 4/2/2021        1. Do you have any new issues? yes - Pain in legs and back       2. Do you need any refills on medications?    no    3. Have you had any imaging done since your last visit? yes - at [facility]     4. Have you been hospitalized or seen in the emergency room since your last visit here?   yes - 03/29    5. Did the patient have a depression screening completed today?  Yes    No data recorded     PHQ-9 Given to (if applicable):               PHQ-9 Score (if applicable):                     [] Positive     []  Negative              Does question #9 need addressed (if applicable)                     [] Yes    []  No               Orlando Balderas MA

## 2021-04-02 NOTE — PROGRESS NOTES
Patient Name: Magdiel Bell  Patient : 1970  Patient MRN: [id number]     Primary Oncologist: Olivia Ojeda MD  Referring Provider: Jennifer Nguyễn MD       Date of Service: 2021      Chief Complaint:   Chief Complaint   Patient presents with    Follow-up        Problem List: Patient Active Problem List:     Fibromyalgia     Lupus (systemic lupus erythematosus) ([address]     Chronic pancreatitis ([address]     HTN (hypertension)     Generalized abdominal pain     Frequent UTI     Gastroesophageal reflux disease without esophagitis     Depression     Fatty liver disease, nonalcoholic     Arthritis     Bilateral low back pain with left-sided sciatica     Intractable vomiting with nausea     Hiatal hernia     Status post laparoscopic sleeve gastrectomy     Pseudoseizure     Chronic pain syndrome     Drug-seeking/Aberrant behavior     Lymph node disorder     Morbid obesity with BMI of 40.0-44.9, adult ([address]     Iron deficiency anemia secondary to blood loss (chronic)     Encounter for weight management     Intractable nausea and vomiting     Seizure ([address]     Abdominal pain     Anxiety     RUQ pain     Intractable vomiting     Status post bariatric surgery     Morbid obesity with BMI of 45.0-49.9, adult ([address]     Discoloration of skin of flank resembling ecchymosis     Morbid obesity with BMI of 50.0-59.9, adult ([address]     Chest pain of uncertain etiology     Cellulitis of left thigh        HPI: Yunior García is a pleasant 44 y.o.  female patient with significant past medical history of gastric ulcer/AVM who presented to ER in 2018  with abdominal pain. On her admission she had normal CBC and Hg dropped during her stay. She had multiple EGD's in the past. Colonoscopy was several years ago. EGD X2 on this admission showed erosive gastritis with bright blood in the lumen. She received 4 units PRBC. She has underlying SLE/connective tissue disorder.  She was followed by surgeon for potential bariatric surgery. She had h/o of MRSA 2 years ago. CT abdomen in 8/2018:  1. No acute intra-abdominal abnormality to account for the patient's symptoms. 2. Status post cholecystectomy with pneumobilia, likely related to prior sphincterotomy. 3. Status post hysterectomy and appendectomy. She has positive RF. Acute viral hepatitis serology in November 2018 was negative. Final Pathologic Diagnosis in August 2018: Stomach, antrum, biopsy: - Mild chronic gastritis (see comment, see stains report). - Helicobacter pylori microorganisms are not identified. LDH and haptoglobin were within normal limits. Labs in September 2018 showed normal WBC, and platelet. Hg was ~11. She was on oral Iron before. On January 17, 2019 she came in for follow-up visit. She started taking iron pill since last time I have seen her. Hemoglobin has improved. Anemia has been corrected. WBC and platelet were within normal limits. She had gastric sleeve surgery on February 12, 2019 and lost ~ 50 lbs. 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 She was seen by ID and placed on IV antibitotic. At present time she is on oral antibiotic as per ID. Hg dropped slowly since than to ~ 8 mg/dL. She still takes oral Iron. CBC on September 4 showed Hg of 9.6. I advise to call for result of Iron. If Iron study showed low Iron, we will decide about parenteral Iron. She is on paxil for depression. She had Iron infusion in September and October 2019. In October 2019 ferritin and hemoglobin improved. I will check Iron study, CBC today and prior to next OV. CBC today on December 2, 2019 showed normal hemoglobin white cell and platelets. Advised to call for the results of iron study tomorrow. She had upper endoscopic study in October 2019 which showed normal study. Pathology report showed slight chronic inflammation and negative H. pylori. CT abdomen pelvis in October 2019 showed no acute abnormality.       She had MRSA bacteremia and [facility] was removed from Right anterior chest wall in 2020. Presented for scheduled follow up on April 2, 2021. She reports she was recently hospitalized 3/29/21 for leg pain, and nausea. She had cellulitis early March 2021 with excision of left medial thigh skin lesion. She reports she feels fatigued frequently. We will check CBC, CMP and iron studies today. She is asked to call for results. She denies nausea, vomiting, diarrhea, no fever, chills, no depression, no acute pain, no melena or hematuria. Past Medical History:  Anemia, acid reflux, anxiety, osteoarthritis, bleeding stomach ulcer in 2014, chronic back pain, depression, fibromyalgia, multiple blood transfusions, hypertension, lupus, chronic pancreatitis, obesity, thyroid disease. Past Surgical History:  Appendectomy in February 1998, cardiac catheter in October 2010, C-sections in August 1991, laparoscopic cholecystectomy in the 90, colonoscopy, upper endoscopic study. Foot surgery, partial hysterectomy in October 1997, partial L lung removal for benign tumor in 2008. Tube and ovary removal in February 1998, tonsillectomy and adenoidectomy in 1975. Current medication:  Reviewed as per chart    Allergies:  Reviewed as per chart,    Social history:  She denies any alcohol use. Denies any history of smoking. Denies any illicit drug use. She has 2 children. Family history:  Daughter has lupus, maternal aunt had breast cancer at age 52, maternal grandmother had stomach cancer at age 80 and maternal grandfather had lung cancer. Review of Systems: \"Per interval history; otherwise 10 point ROS is negative. \"     Vital Signs:  BP (!) 122/58 (Site: Left Upper Arm, Position: Sitting, Cuff Size: Large Adult)   Pulse 56   Temp 96.1 °F (35.6 °C) (Temporal)   Ht 5' 4\" (1.626 m)   Wt 276 lb (125.2 kg)   SpO2 97%   BMI 47.38 kg/m²     Physical Exam:    CONSTITUTIONAL: awake, alert, cooperative, no apparent distress   EYES:sclera clear and conjunctiva normal  ENT: Normocephalic, without obvious abnormality, atraumatic  NECK: supple, symmetrical, no jugular venous distension and no carotid bruits   HEMATOLOGIC/LYMPHATIC: no cervical, supraclavicular or axillary lymphadenopathy   LUNGS: no increased work of breathing and clear to auscultation   CARDIOVASCULAR: regular rate and rhythm, normal S1 and S2, no murmur noted  ABDOMEN: normal bowel sounds x 4, soft, non-distended, non-tender, no masses palpated, no hepatosplenomegaly   MUSCULOSKELETAL: full range of motion noted, tone is normal  NEUROLOGIC: awake, alert, oriented to name, place and time. Motor skills grossly intact. SKIN: Normal skin color, texture, turgor and no jaundice.  appears intact   EXTREMITIES: no LE edema     Labs:    Hematology:  Lab Results   Component Value Date    WBC 5.6 03/29/2021    RBC 4.36 03/29/2021    HGB 11.3 (L) 03/29/2021    HCT 35.3 (L) 03/29/2021    MCV 81.0 03/29/2021    MCH 25.9 (L) 03/29/2021    MCHC 32.0 03/29/2021    RDW 14.1 03/29/2021     03/29/2021    MPV 10.7 03/29/2021    BANDSPCT 4 (L) 08/05/2018    SEGSPCT 48.8 03/29/2021    EOSRELPCT 3.7 (H) 03/29/2021    BASOPCT 0.9 03/29/2021    LYMPHOPCT 39.8 03/29/2021    MONOPCT 6.6 (H) 03/29/2021    BANDABS 0.09 08/05/2018    SEGSABS 2.8 03/29/2021    EOSABS 0.2 03/29/2021    BASOSABS 0.1 03/29/2021    LYMPHSABS 2.2 03/29/2021    MONOSABS 0.4 03/29/2021    DIFFTYPE AUTOMATED DIFFERENTIAL 03/29/2021    ANISOCYTOSIS 1+ 08/05/2018    POLYCHROM 1+ 08/05/2018    PLTM PLATELETS APPEAR SLT DECREASED 08/05/2018     Lab Results   Component Value Date    ESR 25 03/03/2021       Chemistry:  Lab Results   Component Value Date     03/29/2021    K 4.1 03/29/2021     03/29/2021    CO2 22 03/29/2021    BUN 15 03/29/2021    CREATININE 0.8 03/29/2021    GLUCOSE 92 03/29/2021    CALCIUM 10.3 03/29/2021    PROT 6.8 03/29/2021    LABALBU 4.0 03/29/2021    BILITOT 0.3 03/29/2021    ALKPHOS 93 03/29/2021    AST 16 03/29/2021    ALT 12 03/29/2021    LABGLOM >60 03/29/2021    GFRAA >60 03/29/2021    PHOS 4.4 12/04/2015    MG 2.2 01/10/2021    POCGLU 102 (H) 03/06/2021     Lab Results   Component Value Date     08/05/2018     No components found for: LD  Lab Results   Component Value Date    TSHHS 0.887 10/02/2019    T4FREE 0.66 (L) 08/06/2018    FT3 2.5 11/26/2010       Immunology:  Lab Results   Component Value Date    PROT 6.8 03/29/2021    ALBUMINELP 2.2 (L) 11/18/2015    LABALPH 0.6 (H) 11/18/2015    LABALPH 1.3 (H) 11/18/2015    LABBETA 1.1 11/18/2015    GAMGLOB 0.8 11/18/2015     No results found for: Jarold Frames, KLFLCR  No results found for: B2M    Coagulation Panel:  Lab Results   Component Value Date    PROTIME 10.4 (L) 02/12/2021    INR 0.86 02/12/2021    APTT 30.4 02/12/2021    DDIMER 1048 (H) 01/13/2020       Anemia Panel:  Lab Results   Component Value Date    UYLJVQNQ11 >2000 (H) 06/24/2020    FOLATE 8.5 10/02/2019       Observations:  Performance Status: 0  Normal activity, fully active, able to carry on all pre-disease performance without restriction. Depression Status: PHQ-9 Total Score: 1 (4/2/2021  3:19 PM)    Cognitive Status: Oriented to person, time, and place     Assessment & Plan:    1. She has history of UGIB related to AVM/gastritis. H pylori was negative. She received 4 units of PRBC in August 2018. CBC in September showed improvement. She takes daily iron pill. Blood tests in January 2019 showed normal CBC. Ferritin was 45. She had Iron infusion in September and October 2019. CBC on December 2, 2019 was unremarkable. I will check CBC, CMP and iron studies today. She is advised to call for result. 2. She had mediport replacement in September 2018 due to poor peripheral access. She was hospitalized at the time for MRSA and did not have  blood or Iron infusion. 3. I advise to keep age appropriate cancer screening up-to-date. She had gastric sleeve surgery on November 12, 2019.    4.  She had MRSA and osteomyelitis of left foot. She was seen by ID and on antibiotic. 5.  She had mammogram in March 2020, and I recommend to follow up with Dr Mark Gabriel to discuss about colonoscopy. She will also discuss with him about routine colonoscopy. Return to clinic in 3 months or sooner. All of her question has been answered for today. Return to clinic in 3 months or sooner. All of her question has been answered for today. Recent imaging and labs were reviewed and discussed with the patient.       Electronically signed by MICHAEL Uribe CNP on 4/2/21 at 3:47 PM EDT

## 2021-04-06 ENCOUNTER — TELEPHONE (OUTPATIENT)
Dept: ONCOLOGY | Age: 51
End: 2021-04-06

## 2021-04-06 DIAGNOSIS — K90.9 INTESTINAL MALABSORPTION, UNSPECIFIED TYPE: ICD-10-CM

## 2021-04-06 LAB
EKG ATRIAL RATE: 80 BPM
EKG DIAGNOSIS: NORMAL
EKG P AXIS: 41 DEGREES
EKG P-R INTERVAL: 186 MS
EKG Q-T INTERVAL: 364 MS
EKG QRS DURATION: 86 MS
EKG QTC CALCULATION (BAZETT): 419 MS
EKG R AXIS: -22 DEGREES
EKG T AXIS: 11 DEGREES
EKG VENTRICULAR RATE: 80 BPM

## 2021-04-06 RX ORDER — SODIUM CHLORIDE 0.9 % (FLUSH) 0.9 %
5 SYRINGE (ML) INJECTION PRN
Status: CANCELLED | OUTPATIENT
Start: 2021-05-04

## 2021-04-06 RX ORDER — HEPARIN SODIUM (PORCINE) LOCK FLUSH IV SOLN 100 UNIT/ML 100 UNIT/ML
500 SOLUTION INTRAVENOUS PRN
Status: CANCELLED | OUTPATIENT
Start: 2021-05-04

## 2021-04-06 RX ORDER — METHYLPREDNISOLONE SODIUM SUCCINATE 125 MG/2ML
125 INJECTION, POWDER, LYOPHILIZED, FOR SOLUTION INTRAMUSCULAR; INTRAVENOUS ONCE
Status: CANCELLED | OUTPATIENT
Start: 2021-05-04 | End: 2021-05-04

## 2021-04-06 RX ORDER — SODIUM CHLORIDE 9 MG/ML
INJECTION, SOLUTION INTRAVENOUS CONTINUOUS
Status: CANCELLED | OUTPATIENT
Start: 2021-05-04

## 2021-04-06 RX ORDER — DIPHENHYDRAMINE HYDROCHLORIDE 50 MG/ML
50 INJECTION INTRAMUSCULAR; INTRAVENOUS ONCE
Status: CANCELLED | OUTPATIENT
Start: 2021-05-04 | End: 2021-05-04

## 2021-04-06 RX ORDER — EPINEPHRINE 1 MG/ML
0.3 INJECTION, SOLUTION, CONCENTRATE INTRAVENOUS PRN
Status: CANCELLED | OUTPATIENT
Start: 2021-05-04

## 2021-04-06 RX ORDER — SODIUM CHLORIDE 0.9 % (FLUSH) 0.9 %
10 SYRINGE (ML) INJECTION PRN
Status: CANCELLED | OUTPATIENT
Start: 2021-05-04

## 2021-04-07 ENCOUNTER — OFFICE VISIT (OUTPATIENT)
Dept: FAMILY MEDICINE CLINIC | Age: 51
End: 2021-04-07
Payer: COMMERCIAL

## 2021-04-07 VITALS
SYSTOLIC BLOOD PRESSURE: 122 MMHG | BODY MASS INDEX: 47.72 KG/M2 | HEART RATE: 71 BPM | DIASTOLIC BLOOD PRESSURE: 70 MMHG | TEMPERATURE: 97.1 F | WEIGHT: 278 LBS | OXYGEN SATURATION: 97 %

## 2021-04-07 DIAGNOSIS — J44.9 CHRONIC OBSTRUCTIVE PULMONARY DISEASE, UNSPECIFIED COPD TYPE (HCC): ICD-10-CM

## 2021-04-07 DIAGNOSIS — F41.9 ANXIETY: ICD-10-CM

## 2021-04-07 DIAGNOSIS — R42 DIZZINESS: ICD-10-CM

## 2021-04-07 PROCEDURE — G8926 SPIRO NO PERF OR DOC: HCPCS | Performed by: NURSE PRACTITIONER

## 2021-04-07 PROCEDURE — G8427 DOCREV CUR MEDS BY ELIG CLIN: HCPCS | Performed by: NURSE PRACTITIONER

## 2021-04-07 PROCEDURE — G8417 CALC BMI ABV UP PARAM F/U: HCPCS | Performed by: NURSE PRACTITIONER

## 2021-04-07 PROCEDURE — 99213 OFFICE O/P EST LOW 20 MIN: CPT | Performed by: NURSE PRACTITIONER

## 2021-04-07 PROCEDURE — 1111F DSCHRG MED/CURRENT MED MERGE: CPT | Performed by: NURSE PRACTITIONER

## 2021-04-07 PROCEDURE — 3023F SPIROM DOC REV: CPT | Performed by: NURSE PRACTITIONER

## 2021-04-07 PROCEDURE — 3017F COLORECTAL CA SCREEN DOC REV: CPT | Performed by: NURSE PRACTITIONER

## 2021-04-07 PROCEDURE — 1036F TOBACCO NON-USER: CPT | Performed by: NURSE PRACTITIONER

## 2021-04-07 RX ORDER — HYDROXYZINE PAMOATE 50 MG/1
50 CAPSULE ORAL EVERY 6 HOURS PRN
Qty: 30 CAPSULE | Refills: 0 | Status: SHIPPED | OUTPATIENT
Start: 2021-04-07 | End: 2021-05-12 | Stop reason: SDUPTHER

## 2021-04-07 RX ORDER — MECLIZINE HCL 12.5 MG/1
12.5 TABLET ORAL 3 TIMES DAILY PRN
Qty: 30 TABLET | Refills: 0 | Status: SHIPPED | OUTPATIENT
Start: 2021-04-07 | End: 2021-04-17

## 2021-04-07 RX ORDER — ALBUTEROL SULFATE 2.5 MG/3ML
2.5 SOLUTION RESPIRATORY (INHALATION) EVERY 6 HOURS PRN
Qty: 120 EACH | Refills: 0 | Status: SHIPPED | OUTPATIENT
Start: 2021-04-07 | End: 2021-09-09 | Stop reason: SDUPTHER

## 2021-04-07 ASSESSMENT — ENCOUNTER SYMPTOMS
RHINORRHEA: 0
CHEST TIGHTNESS: 0
DIARRHEA: 0
SINUS PRESSURE: 0
SINUS PAIN: 0
NAUSEA: 0
WHEEZING: 0
SHORTNESS OF BREATH: 0
VOMITING: 0
SORE THROAT: 0
COUGH: 0

## 2021-04-07 NOTE — PROGRESS NOTES
Sathya Pope   44 y.o.  female  [id number]      Chief Complaint   Patient presents with    Medication Refill        Subjective:  44 y. o.female is here for a follow up. She has the following chronic/acute medical problems:  Patient Active Problem List   Diagnosis    Fibromyalgia    Lupus (systemic lupus erythematosus) (HCC)    Chronic pancreatitis (HCC)    HTN (hypertension)    Generalized abdominal pain    Frequent UTI    Gastroesophageal reflux disease without esophagitis    Chronic depression    Fatty liver disease, nonalcoholic    Arthritis    Bilateral low back pain with left-sided sciatica    Intractable vomiting with nausea    Hiatal hernia    Status post laparoscopic sleeve gastrectomy    Pseudoseizure    Chronic pain syndrome    Drug-seeking/Aberrant behavior    Lymph node disorder    Morbid obesity with BMI of 40.0-44.9, adult ([facility])    Iron deficiency anemia secondary to blood loss (chronic)    Encounter for weight management    Intractable nausea and vomiting    Seizure (HCC)    Abdominal pain    Anxiety    RUQ pain    Intractable vomiting    Status post bariatric surgery    Morbid obesity with BMI of 45.0-49.9, adult ([facility])    Discoloration of skin of flank resembling ecchymosis    Morbid obesity with BMI of 50.0-59.9, adult ([facility])    Chest pain of uncertain etiology    Cellulitis of left thigh    Malabsorption    COPD (chronic obstructive pulmonary disease) ([email or web address]       Patient is here for a medication refill - albuterol nebulizer, antivert, and vistaril. Patient states she had to reschedule her new patient appointment due to her mother had an appointment. Patient states she establishes care with PCP on 5/12/2021. Review of Systems   Constitutional: Negative for appetite change, chills, fatigue and fever. HENT: Negative for congestion, ear pain, postnasal drip, rhinorrhea, sinus pressure, sinus pain, sneezing and sore throat.     Respiratory: Negative for cough, chest tightness, shortness of breath and wheezing. Cardiovascular: Negative for chest pain and palpitations. Gastrointestinal: Negative for diarrhea, nausea and vomiting. Skin: Negative for rash. Neurological: Negative for dizziness, light-headedness and headaches. Psychiatric/Behavioral: Negative for dysphoric mood. The patient is not nervous/anxious.         Current Outpatient Medications   Medication Sig Dispense Refill    albuterol (PROVENTIL) (2.5 MG/3ML) 0.083% nebulizer solution Take 3 mLs by nebulization every 6 hours as needed for Wheezing 120 each 0    hydrOXYzine (VISTARIL) 50 MG capsule Take 1 capsule by mouth every 6 hours as needed for Anxiety 30 capsule 0    meclizine (ANTIVERT) 12.5 MG tablet Take 1 tablet by mouth 3 times daily as needed for Dizziness 30 tablet 0    betamethasone valerate (VALISONE) 0.1 % ointment APPLY OINTMENT TO AFFECTED AREA TWICE DAILY TO HANDS AND ELBOWS FOR 21 DAYS      EQ PINK-BISMUTH 262 MG chewable tablet CHEW & SWALLOW 2 TABLETS BY MOUTH 4 TIMES DAILY BEFORE MEAL(S) AND NIGHTLY FOR 5 DAYS      cloNIDine (CATAPRES) 0.1 MG tablet TAKE 1 TABLET BY MOUTH TWICE DAILY      mupirocin (BACTROBAN) 2 % ointment APPLY A SMALL AMOUNT TO THE AFFECTED AREA TWICE DAILY FOR 5 TO 10 DAYS      ondansetron (ZOFRAN-ODT) 4 MG disintegrating tablet DISSOLVE 1 TABLET IN MOUTH EVERY 8 HOURS AS NEEDED FOR NAUSEA OR VOMITING      scopolamine (TRANSDERM-SCOP) transdermal patch APPLY 1 PATCH TRANSDERMALLY TO THE SKIN BEHIND THE EAR ONCE EVERY 3 DAYS AS NEEDED      potassium gluconate 550 mg tablet Take 1 tablet by mouth daily      tiZANidine (ZANAFLEX) 4 MG tablet TAKE 1 TABLET BY MOUTH THREE TIMES DAILY AS NEEDED      promethazine (PHENERGAN) 25 MG tablet Take 1 tablet by mouth every 8 hours as needed for Nausea 30 tablet 3    albuterol sulfate  (90 Base) MCG/ACT inhaler Inhale 2 puffs into the lungs 4 times daily 1 Inhaler 5    ferrous sulfate (IRON 325) 325 (65 Fe) MG tablet Take 1 tablet by mouth daily (with breakfast) 90 tablet 5    levETIRAcetam (KEPPRA) 1000 MG tablet Take 1 tablet by mouth 2 times daily 60 tablet 5    dicyclomine (BENTYL) 10 MG capsule Take 1 capsule by mouth 3 times daily as needed (abdominal pain) 21 capsule 3    oxyCODONE-acetaminophen (PERCOCET) 5-325 MG per tablet Take 1 tablet by mouth. Every 4-6 hours PRN      Cholecalciferol (VITAMIN D3) 5000 units TABS Take 1 tablet by mouth daily      estradiol (ESTRACE) 0.5 MG tablet Take 0.5 mg by mouth daily      atenolol (TENORMIN) 25 MG tablet Take 25 mg by mouth daily      Multiple Vitamin (MVI, BARIATRIC ADVANTAGE MULTI-FORMULA, CHEW TAB) Take 1 tablet by mouth daily 30 tablet 5    Calcium Citrate-Vitamin D (CALCIUM + VIT D, BARIATRIC ADVANTAGE, CHEWABLE TABLET) Take 1 tablet by mouth 2 times daily 120 tablet 5    levothyroxine (SYNTHROID) 125 MCG tablet Take 1 tablet by mouth Daily (Patient taking differently: Take 125 mcg by mouth nightly ) 30 tablet 0    gabapentin (NEURONTIN) 800 MG tablet Take 800 mg by mouth 3 times daily      PARoxetine (PAXIL) 30 MG tablet Take 50 mg by mouth nightly        No current facility-administered medications for this visit. Past medical, family,surgical history reviewed today. Objective:  /70 (Site: Left Upper Arm, Position: Sitting, Cuff Size: Large Adult)   Pulse 71   Temp 97.1 °F (36.2 °C)   Wt 278 lb (126.1 kg)   SpO2 97%   BMI 47.72 kg/m²   BP Readings from Last 3 Encounters:   04/07/21 122/70   04/02/21 (!) 122/58   03/30/21 120/72     Wt Readings from Last 3 Encounters:   04/07/21 278 lb (126.1 kg)   04/02/21 276 lb (125.2 kg)   03/29/21 272 lb (123.4 kg)         Physical Exam  Constitutional:       Appearance: Normal appearance. HENT:      Head: Normocephalic. Neck:      Musculoskeletal: Neck supple. Cardiovascular:      Rate and Rhythm: Normal rate and regular rhythm. Heart sounds: Normal heart sounds.    Pulmonary: Effort: Pulmonary effort is normal.      Breath sounds: Normal breath sounds. Skin:     General: Skin is warm and dry. Neurological:      Mental Status: She is alert and oriented to person, place, and time. Psychiatric:         Mood and Affect: Mood normal.         Behavior: Behavior normal.         Lab Results   Component Value Date    WBC 3.8 (L) 04/02/2021    HGB 10.9 (L) 04/02/2021    HCT 33.8 (L) 04/02/2021    MCV 78.4 04/02/2021     04/02/2021     Lab Results   Component Value Date     04/02/2021    K 4.5 04/02/2021     04/02/2021    CO2 27 04/02/2021    BUN 12 04/02/2021    CREATININE 0.8 04/02/2021    GLUCOSE 87 04/02/2021    CALCIUM 9.9 04/02/2021    PROT 6.8 04/02/2021    LABALBU 4.2 04/02/2021    BILITOT 0.2 04/02/2021    ALKPHOS 92 04/02/2021    AST 17 04/02/2021    ALT 14 04/02/2021    LABGLOM >60 04/02/2021    GFRAA >60 04/02/2021     Lab Results   Component Value Date    CHOL 146 02/10/2021     Lab Results   Component Value Date    TRIG 52 02/10/2021    TRIG 161 (H) 07/23/2015     Lab Results   Component Value Date    HDL 51 02/10/2021     No results found for: [facility], LDLCHOLESTEROL  Lab Results   Component Value Date    LABA1C 5.4 02/06/2016     Lab Results   Component Value Date    TSHHS 0.887 10/02/2019         ASSESSMENT/PLAN:      1. Anxiety  Refilled medication.   - hydrOXYzine (VISTARIL) 50 MG capsule; Take 1 capsule by mouth every 6 hours as needed for Anxiety  Dispense: 30 capsule; Refill: 0    2. Dizziness  Refilled medication.   - meclizine (ANTIVERT) 12.5 MG tablet; Take 1 tablet by mouth 3 times daily as needed for Dizziness  Dispense: 30 tablet; Refill: 0    3. Chronic obstructive pulmonary disease, unspecified COPD type ([facility]  Refilled medication. - albuterol (PROVENTIL) (2.5 MG/3ML) 0.083% nebulizer solution; Take 3 mLs by nebulization every 6 hours as needed for Wheezing  Dispense: 120 each;  Refill: 0    Patient to establish care with PCP on 5/12/2021. Medications Discontinued During This Encounter   Medication Reason    albuterol (PROVENTIL) (2.5 MG/3ML) 0.083% nebulizer solution REORDER    hydrOXYzine (VISTARIL) 50 MG capsule REORDER    meclizine (ANTIVERT) 12.5 MG tablet REORDER       No orders of the defined types were placed in this encounter. Care discussed with patient. Questions answered. Patient verbalizes understanding and agrees with plan. After visit summary provided. Advised to call for any problems, questions, or concerns. Return if symptoms worsen or fail to improve.                                              Signed:  MICHAEL Day CNP  04/07/21  7:42 PM

## 2021-04-12 LAB
CULTURE: NORMAL
FUNGUS STAIN: NORMAL
Lab: NORMAL
SPECIMEN: NORMAL

## 2021-04-27 LAB
AFB SMEAR: NORMAL
CULTURE: NORMAL
Lab: NORMAL
SPECIMEN: NORMAL

## 2021-05-01 ENCOUNTER — APPOINTMENT (OUTPATIENT)
Dept: GENERAL RADIOLOGY | Age: 51
End: 2021-05-01
Payer: COMMERCIAL

## 2021-05-01 ENCOUNTER — APPOINTMENT (OUTPATIENT)
Dept: CT IMAGING | Age: 51
End: 2021-05-01
Payer: COMMERCIAL

## 2021-05-01 ENCOUNTER — HOSPITAL ENCOUNTER (OUTPATIENT)
Age: 51
Setting detail: OBSERVATION
Discharge: HOME | End: 2021-05-03
Attending: INTERNAL MEDICINE | Admitting: INTERNAL MEDICINE
Payer: COMMERCIAL

## 2021-05-01 DIAGNOSIS — S99.922A FOOT INJURY, LEFT, INITIAL ENCOUNTER: ICD-10-CM

## 2021-05-01 DIAGNOSIS — R10.9 ABDOMINAL PAIN, UNSPECIFIED ABDOMINAL LOCATION: ICD-10-CM

## 2021-05-01 DIAGNOSIS — R11.2 INTRACTABLE VOMITING WITH NAUSEA: Primary | ICD-10-CM

## 2021-05-01 LAB
ALBUMIN SERPL-MCNC: 4 GM/DL (ref 3.4–5)
ALP BLD-CCNC: 93 IU/L (ref 40–129)
ALT SERPL-CCNC: 13 U/L (ref 10–40)
ANION GAP SERPL CALCULATED.3IONS-SCNC: 6 MMOL/L (ref 4–16)
AST SERPL-CCNC: 18 IU/L (ref 15–37)
BACTERIA: ABNORMAL /HPF
BASOPHILS ABSOLUTE: 0 K/CU MM
BASOPHILS RELATIVE PERCENT: 0.4 % (ref 0–1)
BILIRUB SERPL-MCNC: 0.3 MG/DL (ref 0–1)
BILIRUBIN URINE: NEGATIVE MG/DL
BLOOD, URINE: NEGATIVE
BUN BLDV-MCNC: 13 MG/DL (ref 6–23)
CALCIUM OXALATE CRYSTALS: ABNORMAL /HPF
CALCIUM SERPL-MCNC: 10.6 MG/DL (ref 8.3–10.6)
CHLORIDE BLD-SCNC: 107 MMOL/L (ref 99–110)
CLARITY: ABNORMAL
CO2: 26 MMOL/L (ref 21–32)
COLOR: YELLOW
CREAT SERPL-MCNC: 0.8 MG/DL (ref 0.6–1.1)
DIFFERENTIAL TYPE: ABNORMAL
EOSINOPHILS ABSOLUTE: 0.1 K/CU MM
EOSINOPHILS RELATIVE PERCENT: 2.4 % (ref 0–3)
GFR AFRICAN AMERICAN: >60 ML/MIN/1.73M2
GFR NON-AFRICAN AMERICAN: >60 ML/MIN/1.73M2
GLUCOSE BLD-MCNC: 100 MG/DL (ref 70–99)
GLUCOSE, URINE: NEGATIVE MG/DL
HCT VFR BLD CALC: 34.8 % (ref 37–47)
HEMOGLOBIN: 10.9 GM/DL (ref 12.5–16)
IMMATURE NEUTROPHIL %: 0.2 % (ref 0–0.43)
KETONES, URINE: NEGATIVE MG/DL
LACTATE: 0.8 MMOL/L (ref 0.4–2)
LEUKOCYTE ESTERASE, URINE: NEGATIVE
LIPASE: 18 IU/L (ref 13–60)
LYMPHOCYTES ABSOLUTE: 0.9 K/CU MM
LYMPHOCYTES RELATIVE PERCENT: 17.5 % (ref 24–44)
MCH RBC QN AUTO: 25.5 PG (ref 27–31)
MCHC RBC AUTO-ENTMCNC: 31.3 % (ref 32–36)
MCV RBC AUTO: 81.3 FL (ref 78–100)
MONOCYTES ABSOLUTE: 0.3 K/CU MM
MONOCYTES RELATIVE PERCENT: 6.2 % (ref 0–4)
MUCUS: ABNORMAL HPF
NITRITE URINE, QUANTITATIVE: NEGATIVE
NUCLEATED RBC %: 0 %
PDW BLD-RTO: 13.8 % (ref 11.7–14.9)
PH, URINE: 5 (ref 5–8)
PLATELET # BLD: 180 K/CU MM (ref 140–440)
PMV BLD AUTO: 10 FL (ref 7.5–11.1)
POTASSIUM SERPL-SCNC: 4.3 MMOL/L (ref 3.5–5.1)
PROTEIN UA: NEGATIVE MG/DL
RBC # BLD: 4.28 M/CU MM (ref 4.2–5.4)
RBC URINE: 1 /HPF (ref 0–6)
SEGMENTED NEUTROPHILS ABSOLUTE COUNT: 3.7 K/CU MM
SEGMENTED NEUTROPHILS RELATIVE PERCENT: 73.3 % (ref 36–66)
SODIUM BLD-SCNC: 139 MMOL/L (ref 135–145)
SPECIFIC GRAVITY UA: 1.02 (ref 1–1.03)
SQUAMOUS EPITHELIAL: 4 /HPF
TOTAL IMMATURE NEUTOROPHIL: 0.01 K/CU MM
TOTAL NUCLEATED RBC: 0 K/CU MM
TOTAL PROTEIN: 6.5 GM/DL (ref 6.4–8.2)
TRICHOMONAS: ABNORMAL /HPF
UROBILINOGEN, URINE: NEGATIVE MG/DL (ref 0.2–1)
WBC # BLD: 5 K/CU MM (ref 4–10.5)
WBC UA: 4 /HPF (ref 0–5)

## 2021-05-01 PROCEDURE — 6370000000 HC RX 637 (ALT 250 FOR IP): Performed by: NURSE PRACTITIONER

## 2021-05-01 PROCEDURE — 80053 COMPREHEN METABOLIC PANEL: CPT

## 2021-05-01 PROCEDURE — G0378 HOSPITAL OBSERVATION PER HR: HCPCS

## 2021-05-01 PROCEDURE — 83605 ASSAY OF LACTIC ACID: CPT

## 2021-05-01 PROCEDURE — 94761 N-INVAS EAR/PLS OXIMETRY MLT: CPT

## 2021-05-01 PROCEDURE — 6360000002 HC RX W HCPCS: Performed by: PHYSICIAN ASSISTANT

## 2021-05-01 PROCEDURE — 96376 TX/PRO/DX INJ SAME DRUG ADON: CPT

## 2021-05-01 PROCEDURE — 85025 COMPLETE CBC W/AUTO DIFF WBC: CPT

## 2021-05-01 PROCEDURE — 36591 DRAW BLOOD OFF VENOUS DEVICE: CPT

## 2021-05-01 PROCEDURE — 6360000004 HC RX CONTRAST MEDICATION: Performed by: PHYSICIAN ASSISTANT

## 2021-05-01 PROCEDURE — 81001 URINALYSIS AUTO W/SCOPE: CPT

## 2021-05-01 PROCEDURE — 6360000002 HC RX W HCPCS: Performed by: NURSE PRACTITIONER

## 2021-05-01 PROCEDURE — 96374 THER/PROPH/DIAG INJ IV PUSH: CPT

## 2021-05-01 PROCEDURE — 73620 X-RAY EXAM OF FOOT: CPT

## 2021-05-01 PROCEDURE — 83690 ASSAY OF LIPASE: CPT

## 2021-05-01 PROCEDURE — 74177 CT ABD & PELVIS W/CONTRAST: CPT

## 2021-05-01 PROCEDURE — 73610 X-RAY EXAM OF ANKLE: CPT

## 2021-05-01 PROCEDURE — 2580000003 HC RX 258: Performed by: NURSE PRACTITIONER

## 2021-05-01 PROCEDURE — 36410 VNPNXR 3YR/> PHY/QHP DX/THER: CPT

## 2021-05-01 PROCEDURE — 76937 US GUIDE VASCULAR ACCESS: CPT

## 2021-05-01 PROCEDURE — 36415 COLL VENOUS BLD VENIPUNCTURE: CPT

## 2021-05-01 PROCEDURE — 2580000003 HC RX 258: Performed by: PHYSICIAN ASSISTANT

## 2021-05-01 PROCEDURE — C1751 CATH, INF, PER/CENT/MIDLINE: HCPCS

## 2021-05-01 PROCEDURE — 96375 TX/PRO/DX INJ NEW DRUG ADDON: CPT

## 2021-05-01 PROCEDURE — 99285 EMERGENCY DEPT VISIT HI MDM: CPT

## 2021-05-01 PROCEDURE — 96372 THER/PROPH/DIAG INJ SC/IM: CPT

## 2021-05-01 RX ORDER — MORPHINE SULFATE 4 MG/ML
4 INJECTION, SOLUTION INTRAMUSCULAR; INTRAVENOUS ONCE
Status: COMPLETED | OUTPATIENT
Start: 2021-05-01 | End: 2021-05-01

## 2021-05-01 RX ORDER — 0.9 % SODIUM CHLORIDE 0.9 %
1000 INTRAVENOUS SOLUTION INTRAVENOUS ONCE
Status: COMPLETED | OUTPATIENT
Start: 2021-05-01 | End: 2021-05-01

## 2021-05-01 RX ORDER — SODIUM CHLORIDE 9 MG/ML
25 INJECTION, SOLUTION INTRAVENOUS PRN
Status: DISCONTINUED | OUTPATIENT
Start: 2021-05-01 | End: 2021-05-03 | Stop reason: HOSPADM

## 2021-05-01 RX ORDER — MORPHINE SULFATE 4 MG/ML
4 INJECTION, SOLUTION INTRAMUSCULAR; INTRAVENOUS ONCE
Status: DISCONTINUED | OUTPATIENT
Start: 2021-05-01 | End: 2021-05-01

## 2021-05-01 RX ORDER — MORPHINE SULFATE 2 MG/ML
2 INJECTION, SOLUTION INTRAMUSCULAR; INTRAVENOUS EVERY 4 HOURS PRN
Status: DISCONTINUED | OUTPATIENT
Start: 2021-05-01 | End: 2021-05-03 | Stop reason: HOSPADM

## 2021-05-01 RX ORDER — SODIUM CHLORIDE 0.9 % (FLUSH) 0.9 %
5-40 SYRINGE (ML) INJECTION EVERY 12 HOURS SCHEDULED
Status: DISCONTINUED | OUTPATIENT
Start: 2021-05-01 | End: 2021-05-03 | Stop reason: HOSPADM

## 2021-05-01 RX ORDER — GABAPENTIN 400 MG/1
800 CAPSULE ORAL 2 TIMES DAILY
Status: DISCONTINUED | OUTPATIENT
Start: 2021-05-02 | End: 2021-05-03 | Stop reason: HOSPADM

## 2021-05-01 RX ORDER — PROMETHAZINE HYDROCHLORIDE 25 MG/1
25 TABLET ORAL EVERY 8 HOURS PRN
Status: DISCONTINUED | OUTPATIENT
Start: 2021-05-01 | End: 2021-05-03 | Stop reason: HOSPADM

## 2021-05-01 RX ORDER — POLYETHYLENE GLYCOL 3350 17 G/17G
17 POWDER, FOR SOLUTION ORAL DAILY PRN
Status: DISCONTINUED | OUTPATIENT
Start: 2021-05-01 | End: 2021-05-03 | Stop reason: HOSPADM

## 2021-05-01 RX ORDER — ACETAMINOPHEN 650 MG/1
650 SUPPOSITORY RECTAL EVERY 6 HOURS PRN
Status: DISCONTINUED | OUTPATIENT
Start: 2021-05-01 | End: 2021-05-03 | Stop reason: HOSPADM

## 2021-05-01 RX ORDER — DICYCLOMINE HYDROCHLORIDE 10 MG/1
10 CAPSULE ORAL 3 TIMES DAILY PRN
Status: DISCONTINUED | OUTPATIENT
Start: 2021-05-01 | End: 2021-05-03 | Stop reason: HOSPADM

## 2021-05-01 RX ORDER — PROMETHAZINE HYDROCHLORIDE 25 MG/ML
25 INJECTION, SOLUTION INTRAMUSCULAR; INTRAVENOUS ONCE
Status: COMPLETED | OUTPATIENT
Start: 2021-05-01 | End: 2021-05-01

## 2021-05-01 RX ORDER — SODIUM CHLORIDE 0.9 % (FLUSH) 0.9 %
5-40 SYRINGE (ML) INJECTION PRN
Status: DISCONTINUED | OUTPATIENT
Start: 2021-05-01 | End: 2021-05-03 | Stop reason: HOSPADM

## 2021-05-01 RX ORDER — SODIUM CHLORIDE 0.9 % (FLUSH) 0.9 %
10 SYRINGE (ML) INJECTION PRN
Status: DISCONTINUED | OUTPATIENT
Start: 2021-05-01 | End: 2021-05-03 | Stop reason: HOSPADM

## 2021-05-01 RX ORDER — PANTOPRAZOLE SODIUM 40 MG/10ML
40 INJECTION, POWDER, LYOPHILIZED, FOR SOLUTION INTRAVENOUS DAILY
Status: DISCONTINUED | OUTPATIENT
Start: 2021-05-02 | End: 2021-05-03 | Stop reason: HOSPADM

## 2021-05-01 RX ORDER — PAROXETINE HYDROCHLORIDE 20 MG/1
50 TABLET, FILM COATED ORAL NIGHTLY
Status: DISCONTINUED | OUTPATIENT
Start: 2021-05-02 | End: 2021-05-03 | Stop reason: HOSPADM

## 2021-05-01 RX ORDER — HYDROXYZINE PAMOATE 25 MG/1
50 CAPSULE ORAL EVERY 6 HOURS PRN
Status: DISCONTINUED | OUTPATIENT
Start: 2021-05-01 | End: 2021-05-03 | Stop reason: HOSPADM

## 2021-05-01 RX ORDER — SODIUM CHLORIDE 9 MG/ML
INJECTION, SOLUTION INTRAVENOUS CONTINUOUS
Status: DISCONTINUED | OUTPATIENT
Start: 2021-05-01 | End: 2021-05-03 | Stop reason: HOSPADM

## 2021-05-01 RX ORDER — ONDANSETRON 2 MG/ML
4 INJECTION INTRAMUSCULAR; INTRAVENOUS EVERY 6 HOURS PRN
Status: DISCONTINUED | OUTPATIENT
Start: 2021-05-01 | End: 2021-05-02 | Stop reason: ALTCHOICE

## 2021-05-01 RX ORDER — ATENOLOL 25 MG/1
25 TABLET ORAL DAILY
Status: DISCONTINUED | OUTPATIENT
Start: 2021-05-02 | End: 2021-05-03 | Stop reason: HOSPADM

## 2021-05-01 RX ORDER — CLONIDINE HYDROCHLORIDE 0.1 MG/1
0.1 TABLET ORAL 2 TIMES DAILY
Status: DISCONTINUED | OUTPATIENT
Start: 2021-05-01 | End: 2021-05-03 | Stop reason: HOSPADM

## 2021-05-01 RX ORDER — ACETAMINOPHEN 325 MG/1
650 TABLET ORAL EVERY 6 HOURS PRN
Status: DISCONTINUED | OUTPATIENT
Start: 2021-05-01 | End: 2021-05-03 | Stop reason: HOSPADM

## 2021-05-01 RX ADMIN — CLONIDINE HYDROCHLORIDE 0.1 MG: 0.1 TABLET ORAL at 23:57

## 2021-05-01 RX ADMIN — SODIUM CHLORIDE 1000 ML: 9 INJECTION, SOLUTION INTRAVENOUS at 18:19

## 2021-05-01 RX ADMIN — HYDROXYZINE PAMOATE 50 MG: 25 CAPSULE ORAL at 23:56

## 2021-05-01 RX ADMIN — SODIUM CHLORIDE: 9 INJECTION, SOLUTION INTRAVENOUS at 23:47

## 2021-05-01 RX ADMIN — MORPHINE SULFATE 2 MG: 2 INJECTION, SOLUTION INTRAMUSCULAR; INTRAVENOUS at 23:54

## 2021-05-01 RX ADMIN — SODIUM CHLORIDE, PRESERVATIVE FREE 10 ML: 5 INJECTION INTRAVENOUS at 23:56

## 2021-05-01 RX ADMIN — PROMETHAZINE HYDROCHLORIDE 25 MG: 25 TABLET ORAL at 23:57

## 2021-05-01 RX ADMIN — SODIUM CHLORIDE, PRESERVATIVE FREE 10 ML: 5 INJECTION INTRAVENOUS at 18:48

## 2021-05-01 RX ADMIN — MORPHINE SULFATE 4 MG: 4 INJECTION, SOLUTION INTRAMUSCULAR; INTRAVENOUS at 20:37

## 2021-05-01 RX ADMIN — PROMETHAZINE HYDROCHLORIDE 25 MG: 25 INJECTION INTRAMUSCULAR; INTRAVENOUS at 20:40

## 2021-05-01 RX ADMIN — PROMETHAZINE HYDROCHLORIDE 25 MG: 25 INJECTION INTRAMUSCULAR; INTRAVENOUS at 18:29

## 2021-05-01 RX ADMIN — LEVOTHYROXINE SODIUM 125 MCG: 25 TABLET ORAL at 23:56

## 2021-05-01 RX ADMIN — MORPHINE SULFATE 4 MG: 4 INJECTION, SOLUTION INTRAMUSCULAR; INTRAVENOUS at 18:29

## 2021-05-01 RX ADMIN — IOPAMIDOL 75 ML: 755 INJECTION, SOLUTION INTRAVENOUS at 18:47

## 2021-05-01 ASSESSMENT — PAIN DESCRIPTION - ORIENTATION: ORIENTATION: RIGHT;MID

## 2021-05-01 ASSESSMENT — PAIN DESCRIPTION - PAIN TYPE: TYPE: CHRONIC PAIN

## 2021-05-01 ASSESSMENT — PAIN SCALES - GENERAL
PAINLEVEL_OUTOF10: 7
PAINLEVEL_OUTOF10: 10

## 2021-05-01 ASSESSMENT — PAIN DESCRIPTION - DESCRIPTORS: DESCRIPTORS: ACHING;CONSTANT

## 2021-05-01 ASSESSMENT — PAIN DESCRIPTION - FREQUENCY: FREQUENCY: CONTINUOUS

## 2021-05-01 ASSESSMENT — PAIN DESCRIPTION - LOCATION: LOCATION: ABDOMEN

## 2021-05-01 NOTE — ED PROVIDER NOTES
eMERGENCY dEPARTMENT eNCOUnter      PCP: Neil Klein MD    CHIEF COMPLAINT    Chief Complaint   Patient presents with    Abdominal Pain     history of pancreatitis, reports vomiting and unable to keep medications down.  Fall     Left foot injury       HPI    Vashti Toney is a 44 y.o. female who presents with abdominal pain, nausea, vomiting, diarrhea as well as left foot pain. Patient states abdominal pain began about 1 week ago. She is visiting her son at the time and was seen at a facility there. She states she was diagnosed with a colitis and started on 2 antibiotics, believes that she was on Cipro ofloxacin and Flagyl. She has been taking these antibiotics for about a week without significant improvement of pain, nausea or vomiting. Patient states that she was unable to keep her antibiotics down this morning which prompted ED visit. No hematemesis or ground emesis. Has had episodes of diarrhea as well which she describes as watery. No hematochezia or melena. She states that she did have a fever last evening. Pain in abdomen is most significant in the right upper abdomen, radiates around to the flank and back. She states she does have chronic pancreatitis and is concerned for possible flare of this as well. She does have appointment scheduled with her surgeon, Dr. Amy Zaman next week with plans for possible exploratory surgery as she has had chronic abdominal pain and issues following gastric sleeve surgery. Patient also complaining of dorsal left foot pain. She states she fell on the stairs yesterday, felt a \"pop\". She has had some bruising and worsening pain since then. REVIEW OF SYSTEMS    Constitutional: + fever, no chills, weight loss or weakness   HENT:  Denies sore throat or ear pain   Cardiovascular:  Denies chest pain, palpitations or swelling   Respiratory:  Denies cough or shortness of breath   GI:  See HPI above  : No hematuria or dysuria. No vaginal symptoms. Musculoskeletal:  Denies back pain or groin pain or masses. No pain or swelling of extremities. Skin:  Denies rash  Neurologic:  Denies headache, focal weakness or sensory changes   Endocrine:  Denies polyuria or polydypsia   Lymphatic:  Denies swollen glands     All other review of systems are negative  See HPI and nursing notes for additional information     PAST MEDICAL & SURGICAL HISTORY    Past Medical History:   Diagnosis Date    Acid reflux     Anemia     Anxiety     Arthritis     Hands, Back And Ankles    Bleeding ulcer 2014    \"I Had Ulcers In My Stomach And Colon\"/ per pt on 8/12/2019\"they said recently having some blood in my stomach- in July ( 2019)could not find where coming from \"    Bronchitis Last Episode 2014    Chronic back pain     [facility]     [facility]    COPD (chronic obstructive pulmonary disease) ([facility] [address]     Sees Dr. Shereen Harrison Depression     Fibromyalgia Dx 2013    GERD (gastroesophageal reflux disease)     H/O echocardiogram 08/11/2015    EF >55%. LA to be at the upper limit of normal in size. LV hypertrophy with normal LV systolic, but abnormal diastolic function. Normal valvular structures and function.  H/O echocardiogram 08/30/2018    EF 55-60%    Hiatal hernia     History of blood transfusion 09/2015 And 2018    No Reaction To Blood Transfusions Received    History of sleeve gastrectomy     [place] (hard of hearing)     Right Ear    Hx of cardiac catheterization 10/24/2010    Angiographically normal coronary arteries w/ normal LV function and wall motion.  Hx of transesophageal echocardiography (PILO) for monitoring 12/02/2010    EF 55-60%. WNL.    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 name    Kidney cysts     Priscilla Brewsterdakotah found that I have a kidney cyst but not sure which side\" per pt on 7/15/2020    Lupus ([address] Dx 2013    \"Rheumatoid Lupus\"    MDRO (multiple drug resistant organisms) resistance     4 months ago chest from mediport    Morbid obesity ([address]     MRSA bacteremia     Nausea     \"Most Of The Time\"    Osteomyelitis of fifth toe of left foot ([address]     Pain management     Sees Dr. Carolee Barakat, Once A Month    Pancreatitis chronic Dx 2001    Pneumonia Dx 11-15    Seizures ([address]     Shortness of breath on exertion     Shortness of breath on exertion     Sleep apnea     Has CPAP\"no longer use the cpap since lost weight\"    Staph infection Dx 11-15    Left Foot    Staph infection Dx 11-15    \"Left Foot\"    Teeth missing     Upper And Lower    Thyroid disease     Wears glasses     To Read     Past Surgical History:   Procedure Laterality Date    APPENDECTOMY  02/1998    Done When Tubes And Ovaries Were Removed    CARDIAC CATHETERIZATION  10/24/2010    CATHETER REMOVAL N/A 7/16/2019    PORT REMOVAL performed by Ibrahima Steven MD at [facility]  08/27/1991    CHOLECYSTECTOMY, LAPAROSCOPIC  1990's    COLONOSCOPY  Last Done In 2000's    DENTAL SURGERY      Teeth Extracted In Past    ENDOSCOPY, COLON, DIAGNOSTIC  Last Done In 2018    FOOT DEBRIDEMENT Left 6/16/2019    FIRST METATARSAL DEBRIDEMENT INCISION AND DRAINAGE. EXCISION OF ULCER.  RESECTION OF BONE. 1ST METATARSAL POWER LAVAGE AND BONE CEMENT performed by Major Melo DPM at [address] Left Last Done In 2016     Dr. Marcela Peralta, \" About 6 Surgeries Done Because Of Staph Infection\"    HIATAL HERNIA REPAIR N/A 2/12/2019    HERNIA HIATAL LAPAROSCOPIC ROBOTIC performed by Ibrahima Steven MD at [address]  10/1997    Partial Abdominal Hysterectomy    INSERTION / REMOVAL / REPLACEMENT VENOUS ACCESS CATHETER N/A 8/15/2019    PORT INSERTION performed by Michael Santa MD at [facility]    removed after 6 months    LEG BIOPSY EXCISION Left 3/8/2021    LEFT THIGH LESION BIOPSY EXCISION performed by Michael Santa MD at [facility], PARTIAL Left 2008    Benign    OTHER SURGICAL HISTORY  02/1998    \"Tubes And Ovaries Removed, Appendectomy Also Done\"    OTHER SURGICAL HISTORY  Last Done 7-15-16    Mediport Insertion \"Total Of Six Done, Removed Last Mediport In 2014\"    PORT SURGERY N/A 1/15/2020    PORT REMOVAL performed by Michael Santa MD at [address] [place] [address] N/A 7/6/2020    PORT INSERTION performed by Michael Santa MD at [address] [place] [address] N/A 2/12/2019    GASTRECTOMY SLEEVE LAPAROSCOPIC ROBOTIC performed by Mihcael Santa MD at [address] [place] [address]  08/27/2018    UPPER GASTROINTESTINAL ENDOSCOPY N/A 4/2/2019    EGD CONTROL HEMORRHAGE with epi injection at bleeding site performed by Michael Santa MD at [address] [place] [address] 6/19/2019    EGD DIAGNOSTIC ONLY performed by Michael Santa MD at [address] [place] [address] 7/22/2019    EGD DIAGNOSTIC ONLY performed by Epi Li MD at [address] [place] [address] 10/14/2019    EGD DIAGNOSTIC ONLY performed by Michael Santa MD at [place] [address] N/A 7/17/2020    EGJ DILATATION BALLOON WITH 18-20 MM BALLOON, DILATED TO 20 MM. performed by Michael Santa MD at [address] [place] [address] Outpatient Rx   Medication Sig Dispense Refill    albuterol (PROVENTIL) (2.5 MG/3ML) 0.083% nebulizer solution Take 3 mLs by nebulization every 6 hours as needed for Wheezing 120 each 0    hydrOXYzine (VISTARIL) 50 MG capsule Take 1 capsule by mouth every 6 hours as needed for Anxiety 30 capsule 0    betamethasone valerate (VALISONE) 0.1 % ointment APPLY OINTMENT TO AFFECTED AREA TWICE DAILY TO HANDS AND ELBOWS FOR 21 DAYS      EQ PINK-BISMUTH 262 MG chewable tablet CHEW & SWALLOW 2 TABLETS BY MOUTH 4 TIMES DAILY BEFORE MEAL(S) AND NIGHTLY FOR 5 DAYS      cloNIDine (CATAPRES) 0.1 MG tablet TAKE 1 TABLET BY MOUTH TWICE DAILY      mupirocin (BACTROBAN) 2 % ointment APPLY A SMALL AMOUNT TO THE AFFECTED AREA TWICE DAILY FOR 5 TO 10 DAYS      ondansetron (ZOFRAN-ODT) 4 MG disintegrating tablet DISSOLVE 1 TABLET IN MOUTH EVERY 8 HOURS AS NEEDED FOR NAUSEA OR VOMITING      scopolamine (TRANSDERM-SCOP) transdermal patch APPLY 1 PATCH TRANSDERMALLY TO THE SKIN BEHIND THE EAR ONCE EVERY 3 DAYS AS NEEDED      potassium gluconate 550 mg tablet Take 1 tablet by mouth daily      tiZANidine (ZANAFLEX) 4 MG tablet TAKE 1 TABLET BY MOUTH THREE TIMES DAILY AS NEEDED      promethazine (PHENERGAN) 25 MG tablet Take 1 tablet by mouth every 8 hours as needed for Nausea 30 tablet 3    albuterol sulfate  (90 Base) MCG/ACT inhaler Inhale 2 puffs into the lungs 4 times daily 1 Inhaler 5    ferrous sulfate (IRON 325) 325 (65 Fe) MG tablet Take 1 tablet by mouth daily (with breakfast) 90 tablet 5    levETIRAcetam (KEPPRA) 1000 MG tablet Take 1 tablet by mouth 2 times daily 60 tablet 5    dicyclomine (BENTYL) 10 MG capsule Take 1 capsule by mouth 3 times daily as needed (abdominal pain) 21 capsule 3    oxyCODONE-acetaminophen (PERCOCET) 5-325 MG per tablet Take 1 tablet by mouth.  Every 4-6 hours PRN      Cholecalciferol (VITAMIN D3) 5000 units TABS Take 1 tablet by mouth daily      estradiol (ESTRACE) 0.5 MG tablet Take 0.5 mg by mouth daily      atenolol (TENORMIN) 25 MG tablet Take 25 mg by mouth daily      Multiple Vitamin (MVI, BARIATRIC ADVANTAGE MULTI-FORMULA, CHEW TAB) Take 1 tablet by mouth daily 30 tablet 5    Calcium Citrate-Vitamin D (CALCIUM + VIT D, BARIATRIC ADVANTAGE, CHEWABLE TABLET) Take 1 tablet by mouth 2 times daily 120 tablet 5    levothyroxine (SYNTHROID) 125 MCG tablet Take 1 tablet by mouth Daily (Patient taking differently: Take 125 mcg by mouth nightly ) 30 tablet 0    gabapentin (NEURONTIN) 800 MG tablet Take 800 mg by mouth 3 times daily      PARoxetine (PAXIL) 30 MG tablet Take 50 mg by mouth nightly          ALLERGIES    Allergies   Allergen Reactions    Aspirin Palpitations     \"My Heart Rate Elevates\"    Shellfish-Derived Products Swelling    Toradol [Ketorolac Tromethamine] Rash    Compazine [Prochlorperazine]     Reglan [Metoclopramide] Itching       SOCIAL AND FAMILY HISTORY    Social History     Socioeconomic History    Marital status:      Spouse name: None    Number of children: None    Years of education: None    Highest education level: None   Occupational History    None   Social Needs    Financial resource strain: None    Food insecurity     Worry: None     Inability: None    Transportation needs     Medical: None     Non-medical: None   Tobacco Use    Smoking status: Never Smoker    Smokeless tobacco: Never Used   Substance and Sexual Activity    Alcohol use: No     Alcohol/week: 0.0 standard drinks    Drug use: No    Sexual activity: Not Currently   Lifestyle    Physical activity     Days per week: None     Minutes per session: None    Stress: None   Relationships    Social connections     Talks on phone: None     Gets together: None     Attends Hindu service: None     Active member of club or organization: None     Attends meetings of clubs or organizations: None     Relationship status: None    Intimate partner violence     Fear of current or ex partner: None     Emotionally abused: None     Physically abused: None     Forced sexual activity: None   Other Topics Concern    None   Social History Narrative    None     Family History   Problem Relation Age of Onset    Diabetes Mother         \"Borderline Diabetes\"    High Blood Pressure Mother     Obesity Mother  Arthritis Mother     Heart Disease Mother     High Cholesterol Mother     Vision Loss Mother     Diabetes Father     High Blood Pressure Father     Asthma Father     Cancer Father         prostate cancer    High Blood Pressure Brother     Asthma Son     Vision Loss Son     Lupus Daughter     Other Daughter         \"Alot Of Female Problems\"       PHYSICAL EXAM    VITAL SIGNS: /65   Pulse 76   Temp 98.5 °F (36.9 °C) (Oral)   Resp 19   Ht 5' 4\" (1.626 m)   Wt 270 lb (122.5 kg)   SpO2 100%   BMI 46.35 kg/m²   Constitutional:  Well developed, well nourished. No distress  Eyes:  Sclera nonicteric, conjunctiva moist  HENT:  Atraumatic. PERRL. EOMI. Moist mucus membranes. Neck/Lymphatics: supple, no JVD, no swollen nodes  Respiratory:  No retractions, no accessory muscle use, normal breath sounds   Cardiovascular:   normal rate, normal rhythm, no murmurs    GI:     No gross discoloration. Bowel sounds present, no audible bruits. Soft,  no distention, no guarding, no rigidity,   + RUQ, right mid abdominal tenderness, no rebound tenderness, no palpable pulsatile masses  Back:  + right sided CVA tenderness to percussion. Musculoskeletal:  No edema, no deformity  Left foot with bruising along medial aspect. She has tenderness palpation along dorsal aspect of fourth and fifth metatarsals. Decreased range of motion of ankle due to pain. Plus capillary refill. Sensation intact throughout.   Vascular: DP pulses 2+ equal bilaterally  Integument: No rash, dry skin  Neurologic:  Alert & oriented, normal speech  Psychiatric: Cooperative, pleasant affect       LABS:  Results for orders placed or performed during the hospital encounter of 05/01/21   CBC auto diff   Result Value Ref Range    WBC 5.0 4.0 - 10.5 K/CU MM    RBC 4.28 4.2 - 5.4 M/CU MM    Hemoglobin 10.9 (L) 12.5 - 16.0 GM/DL    Hematocrit 34.8 (L) 37 - 47 %    MCV 81.3 78 - 100 FL    MCH 25.5 (L) 27 - 31 PG    MCHC 31.3 (L) 32.0 - 36.0 % RDW 13.8 11.7 - 14.9 %    Platelets 063 257 - 395 K/CU MM    MPV 10.0 7.5 - 11.1 FL    Differential Type AUTOMATED DIFFERENTIAL     Segs Relative 73.3 (H) 36 - 66 %    Lymphocytes % 17.5 (L) 24 - 44 %    Monocytes % 6.2 (H) 0 - 4 %    Eosinophils % 2.4 0 - 3 %    Basophils % 0.4 0 - 1 %    Segs Absolute 3.7 K/CU MM    Lymphocytes Absolute 0.9 K/CU MM    Monocytes Absolute 0.3 K/CU MM    Eosinophils Absolute 0.1 K/CU MM    Basophils Absolute 0.0 K/CU MM    Nucleated RBC % 0.0 %    Total Nucleated RBC 0.0 K/CU MM    Total Immature Neutrophil 0.01 K/CU MM    Immature Neutrophil % 0.2 0 - 0.43 %   CMP   Result Value Ref Range    Sodium 139 135 - 145 MMOL/L    Potassium 4.3 3.5 - 5.1 MMOL/L    Chloride 107 99 - 110 mMol/L    CO2 26 21 - 32 MMOL/L    BUN 13 6 - 23 MG/DL    CREATININE 0.8 0.6 - 1.1 MG/DL    Glucose 100 (H) 70 - 99 MG/DL    Calcium 10.6 8.3 - 10.6 MG/DL    Albumin 4.0 3.4 - 5.0 GM/DL    Total Protein 6.5 6.4 - 8.2 GM/DL    Total Bilirubin 0.3 0.0 - 1.0 MG/DL    ALT 13 10 - 40 U/L    AST 18 15 - 37 IU/L    Alkaline Phosphatase 93 40 - 129 IU/L    GFR Non-African American >60 >60 mL/min/1.73m2    GFR African American >60 >60 mL/min/1.73m2    Anion Gap 6 4 - 16   Lipase   Result Value Ref Range    Lipase 18 13 - 60 IU/L   Urinalysis   Result Value Ref Range    Color, UA YELLOW YELLOW    Clarity, UA HAZY (A) CLEAR    Glucose, Urine NEGATIVE NEGATIVE MG/DL    Bilirubin Urine NEGATIVE NEGATIVE MG/DL    Ketones, Urine NEGATIVE NEGATIVE MG/DL    Specific Gravity, UA 1.025 1.001 - 1.035    Blood, Urine NEGATIVE NEGATIVE    pH, Urine 5.0 5.0 - 8.0    Protein, UA NEGATIVE NEGATIVE MG/DL    Urobilinogen, Urine NEGATIVE 0.2 - 1.0 MG/DL    Nitrite Urine, Quantitative NEGATIVE NEGATIVE    Leukocyte Esterase, Urine NEGATIVE NEGATIVE    RBC, UA 1 0 - 6 /HPF    WBC, UA 4 0 - 5 /HPF    Bacteria, UA OCCASIONAL (A) NEGATIVE /HPF    Squam Epithel, UA 4 /HPF    Mucus, UA RARE (A) NEGATIVE HPF    Trichomonas, UA NONE SEEN NONE SEEN /HPF    Ca Oxalate Zoila, UA MANY /HPF   Lactic Acid, Plasma   Result Value Ref Range    Lactate 0.8 0.4 - 2.0 mMOL/L           RADIOLOGY/PROCEDURES    CT ABDOMEN PELVIS W IV CONTRAST Additional Contrast? None   Preliminary Result   No acute abnormality of the abdomen or pelvis. XR FOOT LEFT (2 VIEWS)   Final Result   1. No acute osseous abnormality of the left ankle or the left foot. 2. Calcaneal enthesopathy. 3. Chronic deformity/flattening of the 3rd metatarsal head suggestive of   remote subchondral fracture or remote AVN. Mild-to-moderate osteoarthritis   of the 3rd MTP joint. 4. 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 XR ANKLE LEFT (MIN 3 VIEWS)   Final Result   1. No acute osseous abnormality of the left ankle or the left foot. 2. Calcaneal enthesopathy. 3. Chronic deformity/flattening of the 3rd metatarsal head suggestive of   remote subchondral fracture or remote AVN. Mild-to-moderate osteoarthritis   of the 3rd MTP joint. 4. 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 ED COURSE & MEDICAL DECISION MAKING       Vital signs and nursing notes reviewed during ED course. I have independently evaluated this patient . Supervising MD present in the Emergency Department, available for consultation, throughout entirety of  patient care. Patient presents as above with abdominal pain, nausea, vomiting his chief complaint. She also complains of left foot pain following a fall. She is hemodynamically stable, afebrile, not tachycardic, oxygenating 100% on room air on arrival.  Abdomen soft with diffuse discomfort to right upper abdomen. She is given fluids, morphine, Phenergan. We will plan on obtaining labs, imaging of abdomen with her surgical history as well as imaging of left foot and ankle. Work without emergent changes, lactic within normal limits. CT imaging without acute abnormality of abdomen or pelvis.   X-ray imaging of left foot and ankle without acute

## 2021-05-01 NOTE — CONSULTS
Infusaport assessed for patency, confirmed the needle is in the port reservoir. Port flushes easy, several rounds of pulsatile flushing performed but unable to obtain blood return. This is a common problem with this patient's port. May consider Cathflo if she is admitted and once condition stable, not awaiting tests/treatment. ED still needed blood samples so Arrow Endurance Extended Dwell Midline placed in RUE Basilic vessel x1 attempt using sterile ultrasound guided technique. Brisk blood return, blood samples obtained and sent with no issues. Line saline locked and alcohol cap applied. Sterile occlusive dressing w/Biopatch applied per protocol. Patient assisted to/from Ed bathroom without issues. Warm blankets provided. Bryon Temple RN notified of above.

## 2021-05-02 LAB
ALBUMIN SERPL-MCNC: 3.7 GM/DL (ref 3.4–5)
ALP BLD-CCNC: 84 IU/L (ref 40–129)
ALT SERPL-CCNC: 11 U/L (ref 10–40)
ANION GAP SERPL CALCULATED.3IONS-SCNC: 4 MMOL/L (ref 4–16)
AST SERPL-CCNC: 14 IU/L (ref 15–37)
BASOPHILS ABSOLUTE: 0 K/CU MM
BASOPHILS RELATIVE PERCENT: 0.4 % (ref 0–1)
BILIRUB SERPL-MCNC: 0.2 MG/DL (ref 0–1)
BUN BLDV-MCNC: 11 MG/DL (ref 6–23)
CALCIUM SERPL-MCNC: 9.7 MG/DL (ref 8.3–10.6)
CHLORIDE BLD-SCNC: 107 MMOL/L (ref 99–110)
CO2: 26 MMOL/L (ref 21–32)
CREAT SERPL-MCNC: 0.8 MG/DL (ref 0.6–1.1)
DIFFERENTIAL TYPE: ABNORMAL
EOSINOPHILS ABSOLUTE: 0.2 K/CU MM
EOSINOPHILS RELATIVE PERCENT: 4 % (ref 0–3)
GFR AFRICAN AMERICAN: >60 ML/MIN/1.73M2
GFR NON-AFRICAN AMERICAN: >60 ML/MIN/1.73M2
GLUCOSE BLD-MCNC: 95 MG/DL (ref 70–99)
HCT VFR BLD CALC: 29.6 % (ref 37–47)
HEMOGLOBIN: 9.4 GM/DL (ref 12.5–16)
IMMATURE NEUTROPHIL %: 0.2 % (ref 0–0.43)
LACTATE: 0.6 MMOL/L (ref 0.4–2)
LYMPHOCYTES ABSOLUTE: 1.5 K/CU MM
LYMPHOCYTES RELATIVE PERCENT: 30.1 % (ref 24–44)
MCH RBC QN AUTO: 26 PG (ref 27–31)
MCHC RBC AUTO-ENTMCNC: 31.8 % (ref 32–36)
MCV RBC AUTO: 81.8 FL (ref 78–100)
MONOCYTES ABSOLUTE: 0.4 K/CU MM
MONOCYTES RELATIVE PERCENT: 8 % (ref 0–4)
NUCLEATED RBC %: 0 %
PDW BLD-RTO: 13.8 % (ref 11.7–14.9)
PLATELET # BLD: 154 K/CU MM (ref 140–440)
PMV BLD AUTO: 9.9 FL (ref 7.5–11.1)
POTASSIUM SERPL-SCNC: 3.7 MMOL/L (ref 3.5–5.1)
RBC # BLD: 3.62 M/CU MM (ref 4.2–5.4)
SEGMENTED NEUTROPHILS ABSOLUTE COUNT: 2.9 K/CU MM
SEGMENTED NEUTROPHILS RELATIVE PERCENT: 57.3 % (ref 36–66)
SODIUM BLD-SCNC: 137 MMOL/L (ref 135–145)
TOTAL IMMATURE NEUTOROPHIL: 0.01 K/CU MM
TOTAL NUCLEATED RBC: 0 K/CU MM
TOTAL PROTEIN: 5.7 GM/DL (ref 6.4–8.2)
WBC # BLD: 5 K/CU MM (ref 4–10.5)

## 2021-05-02 PROCEDURE — 6360000002 HC RX W HCPCS: Performed by: SURGERY

## 2021-05-02 PROCEDURE — 2580000003 HC RX 258: Performed by: NURSE PRACTITIONER

## 2021-05-02 PROCEDURE — 6360000002 HC RX W HCPCS: Performed by: NURSE PRACTITIONER

## 2021-05-02 PROCEDURE — 96375 TX/PRO/DX INJ NEW DRUG ADDON: CPT

## 2021-05-02 PROCEDURE — 96366 THER/PROPH/DIAG IV INF ADDON: CPT

## 2021-05-02 PROCEDURE — G0378 HOSPITAL OBSERVATION PER HR: HCPCS

## 2021-05-02 PROCEDURE — 6370000000 HC RX 637 (ALT 250 FOR IP): Performed by: NURSE PRACTITIONER

## 2021-05-02 PROCEDURE — C9113 INJ PANTOPRAZOLE SODIUM, VIA: HCPCS | Performed by: NURSE PRACTITIONER

## 2021-05-02 PROCEDURE — 96365 THER/PROPH/DIAG IV INF INIT: CPT

## 2021-05-02 PROCEDURE — 80053 COMPREHEN METABOLIC PANEL: CPT

## 2021-05-02 PROCEDURE — 83605 ASSAY OF LACTIC ACID: CPT

## 2021-05-02 PROCEDURE — 85025 COMPLETE CBC W/AUTO DIFF WBC: CPT

## 2021-05-02 PROCEDURE — 96372 THER/PROPH/DIAG INJ SC/IM: CPT

## 2021-05-02 PROCEDURE — 99218 PR INITIAL OBSERVATION CARE/DAY 30 MINUTES: CPT | Performed by: SURGERY

## 2021-05-02 PROCEDURE — 96376 TX/PRO/DX INJ SAME DRUG ADON: CPT

## 2021-05-02 PROCEDURE — 94761 N-INVAS EAR/PLS OXIMETRY MLT: CPT

## 2021-05-02 RX ORDER — PROMETHAZINE HYDROCHLORIDE 25 MG/ML
12.5 INJECTION, SOLUTION INTRAMUSCULAR; INTRAVENOUS EVERY 6 HOURS
Status: DISCONTINUED | OUTPATIENT
Start: 2021-05-02 | End: 2021-05-03 | Stop reason: HOSPADM

## 2021-05-02 RX ORDER — ONDANSETRON 2 MG/ML
4 INJECTION INTRAMUSCULAR; INTRAVENOUS EVERY 4 HOURS
Status: DISCONTINUED | OUTPATIENT
Start: 2021-05-02 | End: 2021-05-03 | Stop reason: HOSPADM

## 2021-05-02 RX ADMIN — SODIUM CHLORIDE, PRESERVATIVE FREE 10 ML: 5 INJECTION INTRAVENOUS at 20:10

## 2021-05-02 RX ADMIN — LEVETIRACETAM 1000 MG: 100 INJECTION, SOLUTION INTRAVENOUS at 00:47

## 2021-05-02 RX ADMIN — MORPHINE SULFATE 2 MG: 2 INJECTION, SOLUTION INTRAMUSCULAR; INTRAVENOUS at 09:39

## 2021-05-02 RX ADMIN — LEVETIRACETAM 1000 MG: 100 INJECTION, SOLUTION INTRAVENOUS at 23:48

## 2021-05-02 RX ADMIN — MORPHINE SULFATE 2 MG: 2 INJECTION, SOLUTION INTRAMUSCULAR; INTRAVENOUS at 04:07

## 2021-05-02 RX ADMIN — ENOXAPARIN SODIUM 40 MG: 40 INJECTION SUBCUTANEOUS at 09:40

## 2021-05-02 RX ADMIN — PANTOPRAZOLE SODIUM 40 MG: 40 INJECTION, POWDER, FOR SOLUTION INTRAVENOUS at 09:40

## 2021-05-02 RX ADMIN — ONDANSETRON 4 MG: 2 INJECTION INTRAMUSCULAR; INTRAVENOUS at 20:10

## 2021-05-02 RX ADMIN — MORPHINE SULFATE 2 MG: 2 INJECTION, SOLUTION INTRAMUSCULAR; INTRAVENOUS at 14:45

## 2021-05-02 RX ADMIN — LEVOTHYROXINE SODIUM 125 MCG: 25 TABLET ORAL at 20:13

## 2021-05-02 RX ADMIN — CLONIDINE HYDROCHLORIDE 0.1 MG: 0.1 TABLET ORAL at 09:39

## 2021-05-02 RX ADMIN — PROMETHAZINE HYDROCHLORIDE 12.5 MG: 25 INJECTION INTRAMUSCULAR; INTRAVENOUS at 13:45

## 2021-05-02 RX ADMIN — PAROXETINE HYDROCHLORIDE 50 MG: 20 TABLET, FILM COATED ORAL at 20:13

## 2021-05-02 RX ADMIN — ONDANSETRON 4 MG: 2 INJECTION INTRAMUSCULAR; INTRAVENOUS at 13:45

## 2021-05-02 RX ADMIN — HYDROXYZINE PAMOATE 50 MG: 25 CAPSULE ORAL at 20:12

## 2021-05-02 RX ADMIN — SODIUM CHLORIDE: 9 INJECTION, SOLUTION INTRAVENOUS at 13:42

## 2021-05-02 RX ADMIN — DICYCLOMINE HYDROCHLORIDE 10 MG: 10 CAPSULE ORAL at 09:39

## 2021-05-02 RX ADMIN — ONDANSETRON 4 MG: 2 INJECTION INTRAMUSCULAR; INTRAVENOUS at 09:39

## 2021-05-02 RX ADMIN — GABAPENTIN 800 MG: 400 CAPSULE ORAL at 20:12

## 2021-05-02 RX ADMIN — SODIUM CHLORIDE, PRESERVATIVE FREE 10 ML: 5 INJECTION INTRAVENOUS at 09:40

## 2021-05-02 RX ADMIN — PROMETHAZINE HYDROCHLORIDE 12.5 MG: 25 INJECTION INTRAMUSCULAR; INTRAVENOUS at 18:20

## 2021-05-02 RX ADMIN — GABAPENTIN 800 MG: 400 CAPSULE ORAL at 09:39

## 2021-05-02 RX ADMIN — MORPHINE SULFATE 2 MG: 2 INJECTION, SOLUTION INTRAMUSCULAR; INTRAVENOUS at 20:10

## 2021-05-02 RX ADMIN — DICYCLOMINE HYDROCHLORIDE 10 MG: 10 CAPSULE ORAL at 18:22

## 2021-05-02 RX ADMIN — MORPHINE SULFATE 2 MG: 2 INJECTION, SOLUTION INTRAMUSCULAR; INTRAVENOUS at 23:49

## 2021-05-02 RX ADMIN — LEVETIRACETAM 1000 MG: 100 INJECTION, SOLUTION INTRAVENOUS at 13:42

## 2021-05-02 RX ADMIN — PROMETHAZINE HYDROCHLORIDE 12.5 MG: 25 INJECTION INTRAMUSCULAR; INTRAVENOUS at 23:49

## 2021-05-02 RX ADMIN — ATENOLOL 25 MG: 25 TABLET ORAL at 09:39

## 2021-05-02 RX ADMIN — ONDANSETRON 4 MG: 2 INJECTION INTRAMUSCULAR; INTRAVENOUS at 18:20

## 2021-05-02 ASSESSMENT — PAIN DESCRIPTION - PROGRESSION
CLINICAL_PROGRESSION: NOT CHANGED
CLINICAL_PROGRESSION: NOT CHANGED

## 2021-05-02 ASSESSMENT — PAIN SCALES - GENERAL
PAINLEVEL_OUTOF10: 7
PAINLEVEL_OUTOF10: 5
PAINLEVEL_OUTOF10: 5
PAINLEVEL_OUTOF10: 7
PAINLEVEL_OUTOF10: 7

## 2021-05-02 ASSESSMENT — PAIN DESCRIPTION - DESCRIPTORS
DESCRIPTORS: ACHING;CRAMPING;CONSTANT
DESCRIPTORS: ACHING;CONSTANT

## 2021-05-02 ASSESSMENT — PAIN DESCRIPTION - PAIN TYPE
TYPE: CHRONIC PAIN

## 2021-05-02 ASSESSMENT — PAIN DESCRIPTION - FREQUENCY
FREQUENCY: CONTINUOUS
FREQUENCY: CONTINUOUS

## 2021-05-02 ASSESSMENT — PAIN DESCRIPTION - LOCATION
LOCATION: ABDOMEN

## 2021-05-02 ASSESSMENT — PAIN DESCRIPTION - ORIENTATION: ORIENTATION: MID;RIGHT

## 2021-05-02 NOTE — PROGRESS NOTES
[address] [place] [address]  HOSPITALIST PROGRESS NOTE                       Name:  David Morgan /Age/Sex: 1970  (44 y.o. female)   MRN & CSN:  [id number] & [id number] Admission Date/Time: 2021  1:03 PM   Location:  [address] [place] [address] Attending:  Bora Garcia MD                                                  HPI  David Morgan is a 44 y.o. female who presents with abdominal pain    SUBJECTIVE  Still complaining of abdominal pain when seen, no nausea/vomiting    10 point review of systems reviewed and negative unless noted above. ALLERGIES:   Allergies   Allergen Reactions    Aspirin Palpitations     \"My Heart Rate Elevates\"    Shellfish-Derived Products Swelling    Toradol [Ketorolac Tromethamine] Rash    Compazine [Prochlorperazine]     Reglan [Metoclopramide] Itching       PCP: Dawit Lombardi MD    PAST MEDICAL HISTORY, SURGICAL HISTORY, SOCIAL HISTORY and  HOME MEDICATIONS all reviewed. OBJECTIVE  Vitals:    21 2312 21 0411 21 0545 21 0936   BP: (!) 159/81 (!) 106/52  (!) 120/58   Pulse: 80 62  81   Resp: 20 18  16   Temp: 98.2 °F (36.8 °C) 97.8 °F (36.6 °C)  98.1 °F (36.7 °C)   TempSrc: Oral Oral  Oral   SpO2: 97% 95%  95%   Weight:   282 lb (127.9 kg)    Height:           PHYSICAL EXAM   GEN Awake female, sitting upright in bed in no apparent distress. EYES Pupils are equally round. No scleral erythema, discharge, or conjunctivitis. HENT Mucous membranes are moist. Oral pharynx without exudates, no evidence of thrush. NECK Supple, no apparent thyromegaly or masses. RESP Clear to auscultation, no wheezes, rales or rhonchi. Symmetric chest movement  CARDIO/VASC S1/S2 auscultated. Regular rate without appreciable murmurs, rubs, or gallops. No JVD or carotid bruits. Peripheral pulses equal bilaterally and palpable. No peripheral edema. GI Abdomen is soft without significant tenderness, masses, or guarding. Bowel sounds are normoactive. Rectal exam deferred.  No costovertebral angle tenderness. Normal appearing external genitalia. Ryder catheter is not present. HEME/LYMPH No palpable cervical lymphadenopathy and no hepatosplenomegaly. No petechiae or ecchymoses. MSK Spontaneous movement of all extremities. No gross joint deformities. SKIN Normal coloration, warm, dry. NEURO Cranial nerves appear grossly intact, normal speech, no lateralizing weakness. PSYCH Awake, alert, oriented x 4. Affect appropriate. INTAKE: In: 543 [I.V.:443]  Out: 575   OUTPUT: In: 543   Out: 575 [Urine:575]    LABS  Recent Labs     05/01/21  1738 05/02/21  0547   WBC 5.0 5.0   HGB 10.9* 9.4*   HCT 34.8* 29.6*    154      Recent Labs     05/01/21  1738 05/02/21  0547    137   K 4.3 3.7    107   CO2 26 26   BUN 13 11   CREATININE 0.8 0.8     Recent Labs     05/01/21  1738 05/02/21  0547   AST 18 14*   ALT 13 11   BILITOT 0.3 0.2   ALKPHOS 93 84     No results for input(s): INR in the last 72 hours. No results for input(s): CKTOTAL, CKMB, CKMBINDEX, TROPONINT in the last 72 hours.        Abnormal labs for today noted      Imaging:     ECHO:    Microbiology:  Blood culture:    Urine culture:    Sputum culture:    Procedures done this admission:    MEDS  Scheduled Meds:   sodium chloride flush  5-40 mL Intravenous 2 times per day    enoxaparin  40 mg Subcutaneous Daily    atenolol  25 mg Oral Daily    cloNIDine  0.1 mg Oral BID    levetiracetam  1,000 mg Intravenous Q12H    gabapentin  800 mg Oral BID    levothyroxine  125 mcg Oral Nightly    PARoxetine  50 mg Oral Nightly    pantoprazole  40 mg Intravenous Daily     Continuous Infusions:   sodium chloride      sodium chloride 75 mL/hr at 05/01/21 2347     PRN Meds:sodium chloride flush, sodium chloride flush, sodium chloride, polyethylene glycol, acetaminophen **OR** acetaminophen, ondansetron, dicyclomine, hydrOXYzine, promethazine, morphine        ASSESSMENT and [facility] Day: 2    1-Acute on chronic abdominal pain of unclear etiology- CT non-acute, hx of bariatric surgery/chronic pancreatitis- surgery consult pendihng    Other issues  -seizure  -HTN  -COPD  -fibromyalgia          Disp:     Diet DIET GENERAL;   DVT Prophylaxis [] Lovenox, []  Heparin, [] SCDs, [] Ambulation   GI Prophylaxis [] PPI,  [] H2 Blocker,  [] Carafate,  [] Diet/Tube Feeds   Code Status Full Code   Disposition Patient requires continued admission due to abdominal pain   CMS Level of Risk [] Low, [x] Moderate,[]  High  Patient's risk as above due to abdominal pain     RONNELL MONTILLA MD 5/2/2021 11:24 AM

## 2021-05-02 NOTE — H&P
a facility in [address] and was diagnosed with colitis and prescribed Cipro/Flagyl. Reports unable to take her p.o. medications which prompted ED evaluation. Describes pain as constant right upper quadrant cramping pain with episodes of increased intensity. Radiates around her flank to back states she thought she was having an acute pancreatitis flare. She describes she is followed outpatient by general surgery and gastroenterology and states planning for upcoming procedure to do exploratory laparoscopy and a Jet-en-Y. Describes her extensive medical history and states \"I get infections easily\". Reports she is a history of seizures attributed to her chronic pain. States she is not taking her Keppra for 2 doses and concern she may have a seizure. Ten point ROS: reviewed negative, unless as noted in above HPI. Objective:   No intake or output data in the 24 hours ending 05/01/21 2136     Vitals:   Vitals:    05/01/21 1235 05/01/21 2047   BP: (!) 140/78 126/65   Pulse: 76    Resp: 19    Temp: 98.5 °F (36.9 °C)    TempSrc: Oral    SpO2: 100% 100%   Weight: 270 lb (122.5 kg)    Height: 5' 4\" (1.626 m)        Physical Exam: 05/01/21     GEN -Awake nontoxic appearing female, sitting upright in bed , NAD. Obese body habitus. Appears given age. EYES -PERRLA. No scleral erythema, discharge, or conjunctivitis. HENT -MM are moist. Oral pharynx without exudates, no evidence of thrush. NECK -Supple, no apparent thyromegaly or masses. RESP -CTA, no wheezes, rales or rhonchi. Symmetric chest movement while on RA.   C/V -S1/S2 auscultated. RRR without appreciable M/R/G. No JVD or carotid bruits. Peripheral pulses equal bilaterally and palpable. Cap refill <3 sec. No peripheral edema. Left chest wall port  GI -Abdomen is soft non distended right upper quadrant TTP. + BS. No masses or guarding. Rectal exam deferred. No HSM   -No CVA/ flank tenderness. Ryder catheter is not present.   LYMPH-No palpable cervical lymphadenopathy and no hepatosplenomegaly. No petechiae or ecchymoses. MS -No gross joint deformities. SKIN -Normal coloration, warm, dry. NEURO-Cranial nerves appear grossly intact, normal speech, no lateralizing weakness. PSYC-Awake, alert, oriented x 4- person, place, time, situation,  Appropriate affect. Past Medical History:      Past Medical History:   Diagnosis Date    Acid reflux     Anemia     Anxiety     Arthritis     Hands, Back And Ankles    Bleeding ulcer 2014    \"I Had Ulcers In My Stomach And Colon\"/ per pt on 8/12/2019\"they said recently having some blood in my stomach- in July ( 2019)could not find where coming from \"    Bronchitis Last Episode 2014    Chronic back pain     Chronic pain     Sees Dr. Nathan Hall COPD (chronic obstructive pulmonary disease) ([facility] [address]     Sees Dr. Tuan Espinoza Depression     Fibromyalgia Dx 2013    GERD (gastroesophageal reflux disease)     H/O echocardiogram 08/11/2015    EF >55%. LA to be at the upper limit of normal in size. LV hypertrophy with normal LV systolic, but abnormal diastolic function. Normal valvular structures and function.  H/O echocardiogram 08/30/2018    EF 55-60%    Hiatal hernia     History of blood transfusion 09/2015 And 2018    No Reaction To Blood Transfusions Received    History of sleeve gastrectomy     [place] (hard of hearing)     Right Ear    Hx of cardiac catheterization 10/24/2010    Angiographically normal coronary arteries w/ normal LV function and wall motion.  Hx of transesophageal echocardiography (PILO) for monitoring 12/02/2010    EF 55-60%. WNL.    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 name   Carolee Montague 7/17/2020); and Leg biopsy excision (Left, 3/8/2021). Social History:    FAM HX: Reviewed  family history includes Arthritis in her mother; Asthma in her father and son; Cancer in her father; Diabetes in her father and mother; Heart Disease in her mother; High Blood Pressure in her brother, father, and mother; High Cholesterol in her mother; Lupus in her daughter; Obesity in her mother; Other in her daughter; Vision Loss in her mother and son. Soc HX:   Social History     Socioeconomic History    Marital status:      Spouse name: None    Number of children: None    Years of education: None    Highest education level: None   Occupational History    None   Social Needs    Financial resource strain: None    Food insecurity     Worry: None     Inability: None    Transportation needs     Medical: None     Non-medical: None   Tobacco Use    Smoking status: Never Smoker    Smokeless tobacco: Never Used   Substance and Sexual Activity    Alcohol use: No     Alcohol/week: 0.0 standard drinks    Drug use: No    Sexual activity: Not Currently   Lifestyle    Physical activity     Days per week: None     Minutes per session: None    Stress: None   Relationships    Social connections     Talks on phone: None     Gets together: None     Attends Druze service: None     Active member of club or organization: None     Attends meetings of clubs or organizations: None     Relationship status: None    Intimate partner violence     Fear of current or ex partner: None     Emotionally abused: None     Physically abused: None     Forced sexual activity: None   Other Topics Concern    None   Social History Narrative    None     TOBACCO:   reports that she has never smoked. She has never used smokeless tobacco.  ETOH:   reports no history of alcohol use. Drugs:  reports no history of drug use. Allergies:    Allergies   Allergen Reactions    Aspirin Palpitations     \"My Heart Rate Elevates\"    Shellfish-Derived Products Swelling    Toradol [Ketorolac Tromethamine] Rash    Compazine [Prochlorperazine]     Reglan [Metoclopramide] Itching       Home Medications:     Prior to Admission medications    Medication Sig Start Date End Date Taking?  Authorizing Provider   albuterol (PROVENTIL) (2.5 MG/3ML) 0.083% nebulizer solution Take 3 mLs by nebulization every 6 hours as needed for Wheezing 4/7/21   MICHAEL Davis - CNP   hydrOXYzine (VISTARIL) 50 MG capsule Take 1 capsule by mouth every 6 hours as needed for Anxiety 4/7/21   Pineda Martel APRN - CNP   betamethasone valerate (VALISONE) 0.1 % ointment APPLY OINTMENT TO AFFECTED AREA TWICE DAILY TO HANDS AND ELBOWS FOR 21 DAYS 3/26/21   Historical Provider, MD SOLIZ PINK-BISMUTH 262 MG chewable tablet CHEW & SWALLOW 2 TABLETS BY MOUTH 4 TIMES DAILY BEFORE MEAL(S) AND NIGHTLY FOR 5 DAYS 3/26/21   Historical Provider, MD   cloNIDine (CATAPRES) 0.1 MG tablet TAKE 1 TABLET BY MOUTH TWICE DAILY 1/4/21   Historical Provider, MD   mupirocin (BACTROBAN) 2 % ointment APPLY A SMALL AMOUNT TO THE AFFECTED AREA TWICE DAILY FOR 5 TO 10 DAYS 3/27/21   Historical Provider, MD   ondansetron (ZOFRAN-ODT) 4 MG disintegrating tablet DISSOLVE 1 TABLET IN MOUTH EVERY 8 HOURS AS NEEDED FOR NAUSEA OR VOMITING 3/26/21   Historical Provider, MD   scopolamine (TRANSDERM-SCOP) transdermal patch APPLY 1 PATCH TRANSDERMALLY TO THE SKIN BEHIND THE EAR ONCE EVERY 3 DAYS AS NEEDED 1/4/21   Historical Provider, MD   potassium gluconate 550 mg tablet Take 1 tablet by mouth daily 3/25/21   Historical Provider, MD   tiZANidine (ZANAFLEX) 4 MG tablet TAKE 1 TABLET BY MOUTH THREE TIMES DAILY AS NEEDED 3/3/21   Historical Provider, MD   promethazine (PHENERGAN) 25 MG tablet Take 1 tablet by mouth every 8 hours as needed for Nausea 3/14/21   Yue Myrick MD   albuterol sulfate  (90 Base) MCG/ACT inhaler Inhale 2 puffs into the lungs 4 times daily 2/8/21   Sj Andres MD ferrous sulfate (IRON 325) 325 (65 Fe) MG tablet Take 1 tablet by mouth daily (with breakfast) 10/23/20   Soy Busby MD   levETIRAcetam (KEPPRA) 1000 MG tablet Take 1 tablet by mouth 2 times daily 7/19/20   Lupis Lafleur MD   dicyclomine (BENTYL) 10 MG capsule Take 1 capsule by mouth 3 times daily as needed (abdominal pain) 4/13/20   Tamiko Jones [address] [place] [address]   oxyCODONE-acetaminophen (PERCOCET) 5-325 MG per tablet Take 1 tablet by mouth. Every 4-6 hours PRN    Historical Provider, MD   Cholecalciferol (VITAMIN D3) 5000 units TABS Take 1 tablet by mouth daily    Historical Provider, MD   estradiol (ESTRACE) 0.5 MG tablet Take 0.5 mg by mouth daily    Historical Provider, MD   atenolol (TENORMIN) 25 MG tablet Take 25 mg by mouth daily    Historical Provider, MD   Multiple Vitamin (MVI, BARIATRIC ADVANTAGE MULTI-FORMULA, CHEW TAB) Take 1 tablet by mouth daily 2/22/19   Soy Busby MD   Calcium Citrate-Vitamin D (CALCIUM + VIT D, BARIATRIC ADVANTAGE, CHEWABLE TABLET) Take 1 tablet by mouth 2 times daily 2/22/19   Soy Busby MD   levothyroxine (SYNTHROID) 125 MCG tablet Take 1 tablet by mouth Daily  Patient taking differently: Take 125 mcg by mouth nightly  8/8/18   Amy Perdue MD   gabapentin (NEURONTIN) 800 MG tablet Take 800 mg by mouth 3 times daily    Historical Provider, MD   PARoxetine (PAXIL) 30 MG tablet Take 50 mg by mouth nightly     Historical Provider, MD         Medications:   Medications:    Infusions:   PRN Meds: sodium chloride flush, 10 mL, PRN        Data:     Laboratory this visit:  Reviewed  Recent Labs     05/01/21  1738   WBC 5.0   HGB 10.9*   HCT 34.8*         Recent Labs     05/01/21  1738      K 4.3      CO2 26   BUN 13   CREATININE 0.8     Recent Labs     05/01/21  1738   AST 18   ALT 13   BILITOT 0.3   ALKPHOS 93     No results for input(s): INR in the last 72 hours. Radiology this visit:  Reviewed.     Xr Ankle Left (min 3 Views)    Result Date: 5/1/2021  EXAMINATION: THREE XRAY VIEWS OF THE LEFT ANKLE; TWO XRAY VIEWS OF THE LEFT FOOT 5/1/2021 1:57 pm COMPARISON: March 3, 2021 HISTORY: ORDERING SYSTEM PROVIDED HISTORY: fall, pain, bruising medially TECHNOLOGIST PROVIDED HISTORY: Reason for exam:->fall, pain, bruising medially Reason for Exam: fall, pain, bruising medially Acuity: Acute Type of Exam: Initial Mechanism of Injury: fall Relevant Medical/Surgical History: none Acute traumatic pain of the left ankle and the left foot. FINDINGS: Left ankle: No acute fracture or dislocation. No evidence of osteochondral lesion. Joint alignment and joint spaces are maintained. No erosions are present. There is moderate calcaneal enthesopathy at the insertion of the plantar aponeurosis, with mild enthesopathy at the Achilles tendon insertion. Left foot: There is chronic deformity/flattening of the 3rd metatarsal head consistent with remote subchondral fracture or AVN. 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 Mild to moderate osteoarthritis of the 3rd MTP joint. Joint alignment is maintained. No erosions are present. 1. No acute osseous abnormality of the left ankle or the left foot. 2. Calcaneal enthesopathy. 3. Chronic deformity/flattening of the 3rd metatarsal head suggestive of remote subchondral fracture or remote AVN. Mild-to-moderate osteoarthritis of the 3rd MTP joint. 4. 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 Xr Foot Left (2 Views)    Result Date: 5/1/2021  EXAMINATION: THREE XRAY VIEWS OF THE LEFT ANKLE; TWO XRAY VIEWS OF THE LEFT FOOT 5/1/2021 1:57 pm COMPARISON: March 3, 2021 HISTORY: ORDERING SYSTEM PROVIDED HISTORY: fall, pain, bruising medially TECHNOLOGIST PROVIDED HISTORY: Reason for exam:->fall, pain, bruising medially Reason for Exam: fall, pain, bruising medially Acuity: Acute Type of Exam: Initial Mechanism of Injury: fall Relevant Medical/Surgical History: none Acute traumatic pain of the left ankle and the left foot.  FINDINGS: Left ankle: No acute fracture or dislocation. No evidence of osteochondral lesion. Joint alignment and joint spaces are maintained. No erosions are present. There is moderate calcaneal enthesopathy at the insertion of the plantar aponeurosis, with mild enthesopathy at the Achilles tendon insertion. Left foot: There is chronic deformity/flattening of the 3rd metatarsal head consistent with remote subchondral fracture or AVN. 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 Mild to moderate osteoarthritis of the 3rd MTP joint. Joint alignment is maintained. No erosions are present. 1. No acute osseous abnormality of the left ankle or the left foot. 2. Calcaneal enthesopathy. 3. Chronic deformity/flattening of the 3rd metatarsal head suggestive of remote subchondral fracture or remote AVN. Mild-to-moderate osteoarthritis of the 3rd MTP joint. 4. 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 Ct Abdomen Pelvis W Iv Contrast Additional Contrast? None    Result Date: 5/1/2021  EXAMINATION: CT OF THE ABDOMEN AND PELVIS WITH CONTRAST 5/1/2021 6:35 pm TECHNIQUE: CT of the abdomen and pelvis was performed with the administration of intravenous contrast. Multiplanar reformatted images are provided for review. Dose modulation, iterative reconstruction, and/or weight based adjustment of the mA/kV was utilized to reduce the radiation dose to as low as reasonably achievable. COMPARISON: CT abdomen/pelvis performed 03/29/2021 HISTORY: ORDERING SYSTEM PROVIDED HISTORY: Right upper abdominal pain, nausea vomiting, diarrhea, recent colitis diagnosis TECHNOLOGIST PROVIDED HISTORY: Reason for exam:->Right upper abdominal pain, nausea vomiting, diarrhea, recent colitis diagnosis Additional Contrast?->None Reason for Exam: Right upper abdominal pain, nausea vomiting, diarrhea, recent colitis diagnosis Acuity: Acute Type of Exam: Initial FINDINGS: Lower Chest: No acute abnormality of the visualized lower chest. Organs: Status post cholecystectomy.   Intra and extrahepatic pneumobilia, similar to prior examination. The liver, spleen, pancreas, and adrenal glands are grossly unremarkable. 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 Kidneys are otherwise unremarkable. No hydronephrosis. GI/Bowel: Postsurgical changes of prior gastric sleeve. The small and large bowel are nondilated. Pelvis: Status post hysterectomy. The urinary bladder is normal. Peritoneum/Retroperitoneum: No significant lymphadenopathy. The abdominal aorta is normal in course and caliber. Bones/Soft Tissues: No acute soft tissue or osseous abnormality. No acute abnormality of the abdomen or pelvis.        EKG this visit:  Reviewed       Electronically signed by MICHAEL Issa CNP on 5/1/2021 at 9:36 PM

## 2021-05-02 NOTE — ED NOTES
Patient updated on plan of care. Resting in bed with no signs of distress. Resps even and unlabored. Denies any needs. Side rails up x 2.       Jeanine Jiménez, JACKIE  05/01/21 2046

## 2021-05-02 NOTE — CONSULTS
Depression, Fibromyalgia (Dx ), GERD (gastroesophageal reflux disease), H/O echocardiogram (2015), H/O echocardiogram (2018), Hiatal hernia, History of blood transfusion (2015 And ), History of sleeve gastrectomy, [place] (hard of hearing), cardiac catheterization (10/24/2010), transesophageal echocardiography (PILO) for monitoring (2010), OTHER MEDICAL, OTHER MEDICAL, Hypertension, Kidney cysts, Lupus ([address] (Dx ), MDRO (multiple drug resistant organisms) resistance, Morbid obesity ([address] MRSA bacteremia, Nausea, Osteomyelitis of fifth toe of left foot ([address] Pain management, Pancreatitis chronic (Dx ), Pneumonia (Dx -15), Seizures ([address] Shortness of breath on exertion, Shortness of breath on exertion, Sleep apnea, Staph infection (Dx -15), Staph infection (Dx -15), Teeth missing, Thyroid disease, and Wears glasses. PSH:   has a past surgical history that includes Lung removal, partial (Left, );  section (1991); Foot surgery (Left, Last Done In ); Dental surgery; Cholecystectomy, laparoscopic (2407'E); other surgical history (1998); other surgical history (Last Done 7-15-16); Tonsillectomy and adenoidectomy (); Appendectomy (1998); Upper gastrointestinal endoscopy (2018); Lap Band (~); Hysterectomy (10/1997); Endoscopy, colon, diagnostic (Last Done In ); Colonoscopy (Last Done In ); Cardiac catheterization (10/24/2010); Sleeve Gastrectomy (N/A, 2019); hiatal hernia repair (N/A, 2019); Upper gastrointestinal endoscopy (N/A, 2019); Foot Debridement (Left, 2019); Upper gastrointestinal endoscopy (N/A, 2019); Catheter Removal (N/A, 2019); Upper gastrointestinal endoscopy (N/A, 2019); INSERTION / REMOVAL / REPLACEMENT VENOUS ACCESS CATHETER (N/A, 8/15/2019); Upper gastrointestinal endoscopy (N/A, 10/14/2019); [facility] Surgery (N/A, 1/15/2020); [place] [id number] Surgery (N/A, 2020);  Upper gastrointestinal [address], PA-   oxyCODONE-acetaminophen (PERCOCET) 5-325 MG per tablet Take 1 tablet by mouth.  Every 4-6 hours PRN   Yes Historical Provider, MD   Cholecalciferol (VITAMIN D3) 5000 units TABS Take 1 tablet by mouth daily   Yes Historical Provider, MD   estradiol (ESTRACE) 0.5 MG tablet Take 0.5 mg by mouth daily   Yes Historical Provider, MD   atenolol (TENORMIN) 25 MG tablet Take 25 mg by mouth daily   Yes Historical Provider, MD   Multiple Vitamin (MVI, BARIATRIC ADVANTAGE MULTI-FORMULA, CHEW TAB) Take 1 tablet by mouth daily 2/22/19  Yes Abhi Lam MD   Calcium Citrate-Vitamin D (CALCIUM + VIT D, BARIATRIC ADVANTAGE, CHEWABLE TABLET) Take 1 tablet by mouth 2 times daily 2/22/19  Yes Abhi Lam MD   levothyroxine (SYNTHROID) 125 MCG tablet Take 1 tablet by mouth Daily  Patient taking differently: Take 125 mcg by mouth nightly  8/8/18  Yes Constantino Powell MD   gabapentin (NEURONTIN) 800 MG tablet Take 800 mg by mouth 3 times daily   Yes Historical Provider, MD   PARoxetine (PAXIL) 30 MG tablet Take 50 mg by mouth nightly    Yes Historical Provider, MD   betamethasone valerate (VALISONE) 0.1 % ointment APPLY OINTMENT TO AFFECTED AREA TWICE DAILY TO HANDS AND ELBOWS FOR 21 DAYS 3/26/21   Historical Provider, MD SOLIZ PINK-BISMUTH 262 MG chewable tablet CHEW & SWALLOW 2 TABLETS BY MOUTH 4 TIMES DAILY BEFORE MEAL(S) AND NIGHTLY FOR 5 DAYS 3/26/21   Historical Provider, MD   mupirocin (BACTROBAN) 2 % ointment APPLY A SMALL AMOUNT TO THE AFFECTED AREA TWICE DAILY FOR 5 TO 10 DAYS 3/27/21   Historical Provider, MD   potassium gluconate 550 mg tablet Take 1 tablet by mouth daily 3/25/21   Historical Provider, MD        Hospital Meds:    Current Facility-Administered Medications   Medication Dose Route Frequency Provider Last Rate Last Admin    sodium chloride flush 0.9 % injection 10 mL  10 mL Intravenous PRN Joao Reeder MD   10 mL at 05/01/21 7241    sodium chloride flush 0.9 % injection 5-40 mL 5-40 mL Intravenous 2 times per day Les Maid, APRN - CNP   10 mL at 05/01/21 2356    sodium chloride flush 0.9 % injection 5-40 mL  5-40 mL Intravenous PRN Les Maid, APRN - CNP        0.9 % sodium chloride infusion  25 mL Intravenous PRN Les Maid, APRN - CNP        enoxaparin (LOVENOX) injection 40 mg  40 mg Subcutaneous Daily Les Maid, APRN - CNP        polyethylene glycol (GLYCOLAX) packet 17 g  17 g Oral Daily PRN Les Maid, APRN - CNP        acetaminophen (TYLENOL) tablet 650 mg  650 mg Oral Q6H PRN Les Maid, APRN - CNP        Or    acetaminophen (TYLENOL) suppository 650 mg  650 mg Rectal Q6H PRN Les Maid, APRN - CNP        0.9 % sodium chloride infusion   Intravenous Continuous Les Maid, APRN - CNP 75 mL/hr at 05/01/21 2347 New Bag at 05/01/21 2347    ondansetron (ZOFRAN) injection 4 mg  4 mg Intravenous Q6H PRN Les Maid, APRN - CNP        atenolol (TENORMIN) tablet 25 mg  25 mg Oral Daily Les Maid, APRN - CNP        cloNIDine (CATAPRES) tablet 0.1 mg  0.1 mg Oral BID Les Maid, APRN - CNP   0.1 mg at 05/01/21 2357    dicyclomine (BENTYL) capsule 10 mg  10 mg Oral TID PRN Les Maid, APRN - CNP        levETIRAcetam (KEPPRA) 1,000 mg in sodium chloride 0.9 % 100 mL IVPB  1,000 mg Intravenous Q12H Les Maid, APRN - CNP   Stopped at 05/02/21 0102    gabapentin (NEURONTIN) capsule 800 mg  800 mg Oral BID Les Maid, APRN - CNP        levothyroxine (SYNTHROID) tablet 125 mcg  125 mcg Oral Nightly Les Maid, APRN - CNP   125 mcg at 05/01/21 2356    PARoxetine (PAXIL) tablet 50 mg  50 mg Oral Nightly Les Maid, APRN - CNP        hydrOXYzine (VISTARIL) capsule 50 mg  50 mg Oral Q6H PRN Les Maid, APRN - CNP   50 mg at 05/01/21 2356    promethazine (PHENERGAN) tablet 25 mg  25 mg Oral Q8H PRN MICHAEL Morel CNP   25 mg at 05/01/21 5062    morphine (PF) injection 2 mg  2 mg Intravenous Q4H PRN MICHAEL Morel - CNP   2 mg at 05/02/21 0407    pantoprazole issue.    Review of Systems:  Constitutional: Negative for fever, chills, diaphoresis, appetite change and fatigue. HENT: Negative for sore throat, trouble swallowing and voice change. Respiratory: Negative for cough, positive for shortness of breath no wheezing. Cardiovascular: Negative for chest pain positive for SOB with one flight of stairs exertion, no pitting LE edema. Gastrointestinal: Positive for abdominal pain, nausea, vomiting, no abdominal distention, constipation, no diarrhea, blood in stool, anal bleeding or rectal pain. Musculoskeletal: Negative for joint swelling and arthralgias. Swelling in her left plantar forefoot noted x 2 weeks. Skin: Warm and dry, well perfused. Neurological: Negative for seizures, syncope, speech difficulty and weakness. Hematological/Lymphatic: Negative for adenopathy. No history of DVT/PE. Does not bruise/bleed easily. Psychiatric/Behavioral: Negative for agitation. All others reviewed and negative. Physical Exam:  Vital Signs:   Vitals:    05/02/21 0411   BP: (!) 106/52   Pulse: 62   Resp: 18   Temp: 97.8 °F (36.6 °C)   SpO2: 95%     Body mass index is 48.41 kg/m². General appearance: Pt Alert and oriented, in no apparent acute distress. HEENT:  KATE, Conjunctiva clear. EOMI, No JVDs, Bruits, Megalies: neither thyroid nor lymph nodes. Lungs: Clear to auscultation bilaterally. Heart: Regular rate and rhythm, S1, S2 normal, no murmur, rub or gallop. Abdomen: Non tender. Non distended. Positive bowel sounds. No hernias noted, no masses palpable. Extremities: Warm, well perfused, no cyanosis or edema. Skin: Skin color, texture, turgor normal. Left forefoot plantar swelling ? Scar vs lipoma? Neurologic: Grossly normal, Cranial nerves from II to XII intact, Deep tendon reflexes normal.  Lymph nodes: No palpable LNs, Cervical, groins, abdominal.  Musculoskeletal: Bilateral Upper and lower extremities ROM within normal limits.        Radiologic / Imaging / TESTING  Xr Ankle Left (min 3 Views)    Result Date: 5/1/2021  EXAMINATION: THREE XRAY VIEWS OF THE LEFT ANKLE; TWO XRAY VIEWS OF THE LEFT FOOT 5/1/2021 1:57 pm COMPARISON: March 3, 2021 HISTORY: ORDERING SYSTEM PROVIDED HISTORY: fall, pain, bruising medially TECHNOLOGIST PROVIDED HISTORY: Reason for exam:->fall, pain, bruising medially Reason for Exam: fall, pain, bruising medially Acuity: Acute Type of Exam: Initial Mechanism of Injury: fall Relevant Medical/Surgical History: none Acute traumatic pain of the left ankle and the left foot. FINDINGS: Left ankle: No acute fracture or dislocation. No evidence of osteochondral lesion. Joint alignment and joint spaces are maintained. No erosions are present. There is moderate calcaneal enthesopathy at the insertion of the plantar aponeurosis, with mild enthesopathy at the Achilles tendon insertion. Left foot: There is chronic deformity/flattening of the 3rd metatarsal head consistent with remote subchondral fracture or AVN. 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 Mild to moderate osteoarthritis of the 3rd MTP joint. Joint alignment is maintained. No erosions are present. 1. No acute osseous abnormality of the left ankle or the left foot. 2. Calcaneal enthesopathy. 3. Chronic deformity/flattening of the 3rd metatarsal head suggestive of remote subchondral fracture or remote AVN. Mild-to-moderate osteoarthritis of the 3rd MTP joint. 4. 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      Xr Foot Left (2 Views)    Result Date: 5/1/2021  EXAMINATION: THREE XRAY VIEWS OF THE LEFT ANKLE; TWO XRAY VIEWS OF THE LEFT FOOT 5/1/2021 1:57 pm COMPARISON: March 3, 2021 HISTORY: ORDERING SYSTEM PROVIDED HISTORY: fall, pain, bruising medially TECHNOLOGIST PROVIDED HISTORY: Reason for exam:->fall, pain, bruising medially Reason for Exam: fall, pain, bruising medially Acuity: Acute Type of Exam: Initial Mechanism of Injury: fall Relevant Medical/Surgical History: none Acute traumatic pain of the left ankle and the left foot. FINDINGS: Left ankle: No acute fracture or dislocation. No evidence of osteochondral lesion. Joint alignment and joint spaces are maintained. No erosions are present. There is moderate calcaneal enthesopathy at the insertion of the plantar aponeurosis, with mild enthesopathy at the Achilles tendon insertion. Left foot: There is chronic deformity/flattening of the 3rd metatarsal head consistent with remote subchondral fracture or AVN. 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 Mild to moderate osteoarthritis of the 3rd MTP joint. Joint alignment is maintained. No erosions are present. 1. No acute osseous abnormality of the left ankle or the left foot. 2. Calcaneal enthesopathy. 3. Chronic deformity/flattening of the 3rd metatarsal head suggestive of remote subchondral fracture or remote AVN. Mild-to-moderate osteoarthritis of the 3rd MTP joint. 4. 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 Ct Abdomen Pelvis W Iv Contrast Additional Contrast? None    Result Date: 5/1/2021  EXAMINATION: CT OF THE ABDOMEN AND PELVIS WITH CONTRAST 5/1/2021 6:35 pm TECHNIQUE: CT of the abdomen and pelvis was performed with the administration of intravenous contrast. Multiplanar reformatted images are provided for review. Dose modulation, iterative reconstruction, and/or weight based adjustment of the mA/kV was utilized to reduce the radiation dose to as low as reasonably achievable.  COMPARISON: CT abdomen/pelvis performed 03/29/2021 HISTORY: ORDERING SYSTEM PROVIDED HISTORY: Right upper abdominal pain, nausea vomiting, diarrhea, recent colitis diagnosis TECHNOLOGIST PROVIDED HISTORY: Reason for exam:->Right upper abdominal pain, nausea vomiting, diarrhea, recent colitis diagnosis Additional Contrast?->None Reason for Exam: Right upper abdominal pain, nausea vomiting, diarrhea, recent colitis diagnosis Acuity: Acute Type of Exam: Initial FINDINGS: Lower Chest: No acute abnormality of the visualized lower chest. Organs: Status post cholecystectomy. Intra and extrahepatic pneumobilia, similar to prior examination. The liver, spleen, pancreas, and adrenal glands are grossly unremarkable. 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 Kidneys are otherwise unremarkable. No hydronephrosis. GI/Bowel: Postsurgical changes of prior gastric sleeve. The small and large bowel are nondilated. Pelvis: Status post hysterectomy. The urinary bladder is normal. Peritoneum/Retroperitoneum: No significant lymphadenopathy. The abdominal aorta is normal in course and caliber. Bones/Soft Tissues: No acute soft tissue or osseous abnormality. No acute abnormality of the abdomen or pelvis.         Labs:    Recent Results (from the past 24 hour(s))   Urinalysis    Collection Time: 05/01/21  4:53 PM   Result Value Ref Range    Color, UA YELLOW YELLOW    Clarity, UA HAZY (A) CLEAR    Glucose, Urine NEGATIVE NEGATIVE MG/DL    Bilirubin Urine NEGATIVE NEGATIVE MG/DL    Ketones, Urine NEGATIVE NEGATIVE MG/DL    Specific Gravity, UA 1.025 1.001 - 1.035    Blood, Urine NEGATIVE NEGATIVE    pH, Urine 5.0 5.0 - 8.0    Protein, UA NEGATIVE NEGATIVE MG/DL    Urobilinogen, Urine NEGATIVE 0.2 - 1.0 MG/DL    Nitrite Urine, Quantitative NEGATIVE NEGATIVE    Leukocyte Esterase, Urine NEGATIVE NEGATIVE    RBC, UA 1 0 - 6 /HPF    WBC, UA 4 0 - 5 /HPF    Bacteria, UA OCCASIONAL (A) NEGATIVE /HPF    Squam Epithel, UA 4 /HPF    Mucus, UA RARE (A) NEGATIVE HPF    Trichomonas, UA NONE SEEN NONE SEEN /HPF    Ca Oxalate Zoila, UA MANY /HPF   CBC auto diff    Collection Time: 05/01/21  5:38 PM   Result Value Ref Range    WBC 5.0 4.0 - 10.5 K/CU MM    RBC 4.28 4.2 - 5.4 M/CU MM    Hemoglobin 10.9 (L) 12.5 - 16.0 GM/DL    Hematocrit 34.8 (L) 37 - 47 %    MCV 81.3 78 - 100 FL    MCH 25.5 (L) 27 - 31 PG    MCHC 31.3 (L) 32.0 - 36.0 %    RDW 13.8 11.7 - 14.9 %    Platelets 457 080 - 931 K/CU MM    MPV 10.0 7.5 - 11.1 FL    Differential Type AUTOMATED DIFFERENTIAL     Segs Relative 73.3 (H) 36 - 66 %    Lymphocytes % 17.5 (L) 24 - 44 %    Monocytes % 6.2 (H) 0 - 4 %    Eosinophils % 2.4 0 - 3 %    Basophils % 0.4 0 - 1 %    Segs Absolute 3.7 K/CU MM    Lymphocytes Absolute 0.9 K/CU MM    Monocytes Absolute 0.3 K/CU MM    Eosinophils Absolute 0.1 K/CU MM    Basophils Absolute 0.0 K/CU MM    Nucleated RBC % 0.0 %    Total Nucleated RBC 0.0 K/CU MM    Total Immature Neutrophil 0.01 K/CU MM    Immature Neutrophil % 0.2 0 - 0.43 %   CMP    Collection Time: 05/01/21  5:38 PM   Result Value Ref Range    Sodium 139 135 - 145 MMOL/L    Potassium 4.3 3.5 - 5.1 MMOL/L    Chloride 107 99 - 110 mMol/L    CO2 26 21 - 32 MMOL/L    BUN 13 6 - 23 MG/DL    CREATININE 0.8 0.6 - 1.1 MG/DL    Glucose 100 (H) 70 - 99 MG/DL    Calcium 10.6 8.3 - 10.6 MG/DL    Albumin 4.0 3.4 - 5.0 GM/DL    Total Protein 6.5 6.4 - 8.2 GM/DL    Total Bilirubin 0.3 0.0 - 1.0 MG/DL    ALT 13 10 - 40 U/L    AST 18 15 - 37 IU/L    Alkaline Phosphatase 93 40 - 129 IU/L    GFR Non-African American >60 >60 mL/min/1.73m2    GFR African American >60 >60 mL/min/1.73m2    Anion Gap 6 4 - 16   Lipase    Collection Time: 05/01/21  5:38 PM   Result Value Ref Range    Lipase 18 13 - 60 IU/L   Lactic Acid, Plasma    Collection Time: 05/01/21  5:43 PM   Result Value Ref Range    Lactate 0.8 0.4 - 2.0 mMOL/L   Comprehensive Metabolic Panel w/ Reflex to MG    Collection Time: 05/02/21  5:47 AM   Result Value Ref Range    Sodium 137 135 - 145 MMOL/L    Potassium 3.7 3.5 - 5.1 MMOL/L    Chloride 107 99 - 110 mMol/L    CO2 26 21 - 32 MMOL/L    BUN 11 6 - 23 MG/DL    CREATININE 0.8 0.6 - 1.1 MG/DL    Glucose 95 70 - 99 MG/DL    Calcium 9.7 8.3 - 10.6 MG/DL    Albumin 3.7 3.4 - 5.0 GM/DL    Total Protein 5.7 (L) 6.4 - 8.2 GM/DL    Total Bilirubin 0.2 0.0 - 1.0 MG/DL    ALT 11 10 - 40 U/L    AST 14 (L) 15 - 37 IU/L    Alkaline Phosphatase 84 40 - 129 IU/L    GFR  Abdominal pain    Anxiety    RUQ pain    Intractable vomiting    Status post bariatric surgery    Morbid obesity with BMI of 45.0-49.9, adult (HCC)    Discoloration of skin of flank resembling ecchymosis    Morbid obesity with BMI of 50.0-59.9, adult (HCC)    Chest pain of uncertain etiology    Cellulitis of left thigh    Malabsorption    COPD (chronic obstructive pulmonary disease) ([facility])       Assessment & plan:  David Morgan is a very pleasant 44 y.o. female presenting with h/o sleeve gastrectomy still morbidly obese and NEEDS the Jet en Y gastric bypass, working on it. . and I will do it ASAP. Left foot swelling will follow and possibly get a CT / MRI to evaluate it better. Thank you doctor very much for your consultation and for the opportunity to take care of Mrs David Morgan with you, I'll follow along with you and I'll update you on any new events in her care.    ____________________________________________    Constantino Casillas MD, FACS, FICS    5/2/2021  7:40 AM      Patient was seen with total face to face time of 45 minutes. More than 50% of this visit was counseling and education as above in my assessment and plan section of my note.

## 2021-05-02 NOTE — ED NOTES
Patient updated on plan of care. Resting in bed with no signs of distress. Resps even and unlabored. Denies any needs. Side rails up x 2.       Hector Cabrera RN  05/01/21 2046

## 2021-05-03 VITALS
DIASTOLIC BLOOD PRESSURE: 51 MMHG | HEART RATE: 53 BPM | OXYGEN SATURATION: 95 % | WEIGHT: 289.19 LBS | BODY MASS INDEX: 49.37 KG/M2 | TEMPERATURE: 97.5 F | RESPIRATION RATE: 16 BRPM | HEIGHT: 64 IN | SYSTOLIC BLOOD PRESSURE: 102 MMHG

## 2021-05-03 PROCEDURE — 96376 TX/PRO/DX INJ SAME DRUG ADON: CPT

## 2021-05-03 PROCEDURE — 96372 THER/PROPH/DIAG INJ SC/IM: CPT

## 2021-05-03 PROCEDURE — C9113 INJ PANTOPRAZOLE SODIUM, VIA: HCPCS | Performed by: NURSE PRACTITIONER

## 2021-05-03 PROCEDURE — 2580000003 HC RX 258: Performed by: NURSE PRACTITIONER

## 2021-05-03 PROCEDURE — 6360000002 HC RX W HCPCS: Performed by: NURSE PRACTITIONER

## 2021-05-03 PROCEDURE — 6370000000 HC RX 637 (ALT 250 FOR IP): Performed by: NURSE PRACTITIONER

## 2021-05-03 PROCEDURE — G0378 HOSPITAL OBSERVATION PER HR: HCPCS

## 2021-05-03 PROCEDURE — 6360000002 HC RX W HCPCS: Performed by: SURGERY

## 2021-05-03 RX ORDER — PROMETHAZINE HYDROCHLORIDE 25 MG/1
25 TABLET ORAL EVERY 8 HOURS PRN
Qty: 30 TABLET | Refills: 0 | Status: ON HOLD | OUTPATIENT
Start: 2021-05-03 | End: 2021-05-11 | Stop reason: HOSPADM

## 2021-05-03 RX ADMIN — PANTOPRAZOLE SODIUM 40 MG: 40 INJECTION, POWDER, FOR SOLUTION INTRAVENOUS at 07:59

## 2021-05-03 RX ADMIN — ONDANSETRON 4 MG: 2 INJECTION INTRAMUSCULAR; INTRAVENOUS at 12:46

## 2021-05-03 RX ADMIN — ONDANSETRON 4 MG: 2 INJECTION INTRAMUSCULAR; INTRAVENOUS at 01:41

## 2021-05-03 RX ADMIN — SODIUM CHLORIDE, PRESERVATIVE FREE 10 ML: 5 INJECTION INTRAVENOUS at 08:00

## 2021-05-03 RX ADMIN — ENOXAPARIN SODIUM 40 MG: 40 INJECTION SUBCUTANEOUS at 08:00

## 2021-05-03 RX ADMIN — ONDANSETRON 4 MG: 2 INJECTION INTRAMUSCULAR; INTRAVENOUS at 04:57

## 2021-05-03 RX ADMIN — LEVETIRACETAM 1000 MG: 100 INJECTION, SOLUTION INTRAVENOUS at 12:46

## 2021-05-03 RX ADMIN — SODIUM CHLORIDE: 9 INJECTION, SOLUTION INTRAVENOUS at 01:41

## 2021-05-03 RX ADMIN — PROMETHAZINE HYDROCHLORIDE 12.5 MG: 25 INJECTION INTRAMUSCULAR; INTRAVENOUS at 12:46

## 2021-05-03 RX ADMIN — MORPHINE SULFATE 2 MG: 2 INJECTION, SOLUTION INTRAMUSCULAR; INTRAVENOUS at 03:43

## 2021-05-03 RX ADMIN — HYDROXYZINE PAMOATE 50 MG: 25 CAPSULE ORAL at 01:41

## 2021-05-03 RX ADMIN — GABAPENTIN 800 MG: 400 CAPSULE ORAL at 07:59

## 2021-05-03 RX ADMIN — MORPHINE SULFATE 2 MG: 2 INJECTION, SOLUTION INTRAMUSCULAR; INTRAVENOUS at 08:00

## 2021-05-03 RX ADMIN — MORPHINE SULFATE 2 MG: 2 INJECTION, SOLUTION INTRAMUSCULAR; INTRAVENOUS at 12:46

## 2021-05-03 RX ADMIN — ONDANSETRON 4 MG: 2 INJECTION INTRAMUSCULAR; INTRAVENOUS at 08:00

## 2021-05-03 RX ADMIN — PROMETHAZINE HYDROCHLORIDE 12.5 MG: 25 INJECTION INTRAMUSCULAR; INTRAVENOUS at 07:04

## 2021-05-03 ASSESSMENT — PAIN DESCRIPTION - LOCATION
LOCATION: ABDOMEN
LOCATION: ABDOMEN

## 2021-05-03 ASSESSMENT — PAIN DESCRIPTION - PAIN TYPE
TYPE: CHRONIC PAIN
TYPE: CHRONIC PAIN

## 2021-05-03 ASSESSMENT — PAIN SCALES - GENERAL
PAINLEVEL_OUTOF10: 7

## 2021-05-03 NOTE — PLAN OF CARE
Problem: Pain:  Goal: Pain level will decrease  Description: Pain level will decrease  5/2/2021 2217 by Marcie Adler RN  Outcome: Ongoing  5/2/2021 1339 by Tracy Chauhan RN  Outcome: Ongoing  Goal: Control of acute pain  Description: Control of acute pain  5/2/2021 2217 by Marcie Adler RN  Outcome: Ongoing  5/2/2021 1339 by Tracy Chauhan RN  Outcome: Ongoing  Goal: Control of chronic pain  Description: Control of chronic pain  5/2/2021 2217 by Marcie Adler RN  Outcome: Ongoing  5/2/2021 1339 by Tracy Chauhan RN  Outcome: Ongoing     Problem: Falls - Risk of:  Goal: Will remain free from falls  Description: Will remain free from falls  5/2/2021 2217 by Marcie Adler RN  Outcome: Ongoing  5/2/2021 1339 by Tracy Chauhan RN  Outcome: Ongoing  Goal: Absence of physical injury  Description: Absence of physical injury  5/2/2021 2217 by Marcie Adler RN  Outcome: Ongoing  5/2/2021 1339 by Tracy Chauhan RN  Outcome: Ongoing

## 2021-05-03 NOTE — DISCHARGE SUMMARY
Discharge Summary    Name:  Dalton Stanley /Age/Sex: 1970  (44 y.o. female)   MRN & CSN:  [id number] & [id number] Admission Date/Time: 2021  1:03 PM   Attending:  Dhaval Vazquez MD Discharging Physician: Evert Albarran MD     HPI and Hospital Course:   Dlaton Stanley is a 44 y.o.  female  who presented with abdominal pain    HPI- as per H and [address]     1-Acute on chronic abdominal pain of unclear etiology- CT non-acute, hx of bariatric surgery-failed sleeve gastrectomy, also chronic pancreatitis- reports improvement to her baseline- she was hoping surgeon will do ex-lap and Jet en Y this admission but per my discussion with  no surgery planned this admission- when I told her that then reported wanting to go home- has tolerated oral diet- requested refill for phenergan which is given.     Other issues  -seizure  -HTN  -COPD  -fibromyalgia    The patient expressed appropriate understanding of and agreement with the discharge recommendations, medications, and plan.      Consults this admission:  IP CONSULT TO HOSPITALIST  IP CONSULT TO GENERAL SURGERY    Discharge Instruction:   Follow up appointments:   Primary care physician:  within 2 weeks    Diet:  General/cardiac/ADA/as tolerated  Activity: {discharge activity: as tolerated  Disposition: Discharged to:   [x]Home, []C, []SNF, []Acute Rehab, []Hospice   Condition on discharge: Stable    Discharge Medications:      Avi Pope Medication Instructions MARINE:[id number]    Printed on:21 0948   Medication Information                      albuterol (PROVENTIL) (2.5 MG/3ML) 0.083% nebulizer solution  Take 3 mLs by nebulization every 6 hours as needed for Wheezing             albuterol sulfate  (90 Base) MCG/ACT inhaler  Inhale 2 puffs into the lungs 4 times daily             atenolol (TENORMIN) 25 MG tablet  Take 25 mg by mouth daily             betamethasone valerate (VALISONE) 0.1 % ointment  APPLY OINTMENT TO AFFECTED AREA TWICE DAILY TO HANDS AND ELBOWS FOR 21 DAYS             Calcium Citrate-Vitamin D (CALCIUM + VIT D, BARIATRIC ADVANTAGE, CHEWABLE TABLET)  Take 1 tablet by mouth 2 times daily             Cholecalciferol (VITAMIN D3) 5000 units TABS  Take 1 tablet by mouth daily             cloNIDine (CATAPRES) 0.1 MG tablet  TAKE 1 TABLET BY MOUTH TWICE DAILY             dicyclomine (BENTYL) 10 MG capsule  Take 1 capsule by mouth 3 times daily as needed (abdominal pain)             EQ PINK-BISMUTH 262 MG chewable tablet  CHEW & SWALLOW 2 TABLETS BY MOUTH 4 TIMES DAILY BEFORE MEAL(S) AND NIGHTLY FOR 5 DAYS             estradiol (ESTRACE) 0.5 MG tablet  Take 0.5 mg by mouth daily             ferrous sulfate (IRON 325) 325 (65 Fe) MG tablet  Take 1 tablet by mouth daily (with breakfast)             gabapentin (NEURONTIN) 800 MG tablet  Take 800 mg by mouth 3 times daily             hydrOXYzine (VISTARIL) 50 MG capsule  Take 1 capsule by mouth every 6 hours as needed for Anxiety             levETIRAcetam (KEPPRA) 1000 MG tablet  Take 1 tablet by mouth 2 times daily             levothyroxine (SYNTHROID) 125 MCG tablet  Take 1 tablet by mouth Daily             Multiple Vitamin (MVI, BARIATRIC ADVANTAGE MULTI-FORMULA, CHEW TAB)  Take 1 tablet by mouth daily             mupirocin (BACTROBAN) 2 % ointment  APPLY A SMALL AMOUNT TO THE AFFECTED AREA TWICE DAILY FOR 5 TO 10 DAYS             ondansetron (ZOFRAN-ODT) 4 MG disintegrating tablet  DISSOLVE 1 TABLET IN MOUTH EVERY 8 HOURS AS NEEDED FOR NAUSEA OR VOMITING             oxyCODONE-acetaminophen (PERCOCET) 5-325 MG per tablet  Take 1 tablet by mouth.  Every 4-6 hours PRN             PARoxetine (PAXIL) 30 MG tablet  Take 50 mg by mouth nightly              potassium gluconate 550 mg tablet  Take 1 tablet by mouth daily             promethazine (PHENERGAN) 25 MG tablet  Take 1 tablet by mouth every 8 hours as needed for Nausea             scopolamine (TRANSDERM-SCOP) transdermal patch  APPLY 1 PATCH TRANSDERMALLY TO THE SKIN BEHIND THE EAR ONCE EVERY 3 DAYS AS NEEDED             tiZANidine (ZANAFLEX) 4 MG tablet  TAKE 1 TABLET BY MOUTH THREE TIMES DAILY AS NEEDED                 Objective Findings at Discharge:   BP (!) 102/51   Pulse 53   Temp 97.5 °F (36.4 °C) (Oral)   Resp 16   Ht 5' 4\" (1.626 m)   Wt 289 lb 3 oz (131.2 kg)   SpO2 95%   BMI 49.64 kg/m²            PHYSICAL EXAM  GEN Awake female, laying in bed in no apparent distress. EYES Pupils are equally round. No scleral discharge  HENT Atraumatic and symmetric head  NECK No apparent thyromegaly  RESP Symmetric chest movement while on room air. CARDIO/VASC Peripheral pulses equal bilaterally and palpable. GI Abdomen is not distended. Rectal exam deferred.  Ryder catheter is not present. HEME/LYMPH No petechiae or ecchymoses. MSK Spontaneous movement of BL upper extremities  SKIN Normal coloration, warm, dry. NEURO Cranial nerves appear grossly intact  PSYCH Awake, alert.     BMP/CBC  Recent Labs     05/01/21  1738 05/02/21  0547    137   K 4.3 3.7    107   CO2 26 26   BUN 13 11   CREATININE 0.8 0.8   WBC 5.0 5.0   HCT 34.8* 29.6*    154     SIGNIFICANT IMAGING AND LABS:      Discharge Time of 31 minutes    Electronically signed by Davide Schneider MD on 5/3/2021 at 9:48 AM

## 2021-05-04 ENCOUNTER — CARE COORDINATION (OUTPATIENT)
Dept: CASE MANAGEMENT | Age: 51
End: 2021-05-04

## 2021-05-04 NOTE — CARE COORDINATION
[facility] Transitions Initial Follow Up Call    Call within 2 business days of discharge: Yes    Patient: Mia Kaur Patient : 1970   MRN: [id number]  Reason for Admission: Intractable vomiting with nausea  Discharge Date: 5/3/21 RARS: Readmission Risk Score: 29      Last Discharge [facility]       Complaint Diagnosis Description Type Department Provider    21 Abdominal Pain; Fall Intractable vomiting with nausea . .. ED to Hosp-Admission (Discharged) (ADMITTED) Jeanna Paiz MD; Syeda Castellon... Spoke with: no one    Facility: [facility] Transitions 24 Hour Call    Care Transitions Interventions       Initial attempt at [facility] discharge phone call. Unable to reach patient. Left message. Contact info provided. Requested return call to [facility]N.     Follow Up  Future Appointments   Date Time Provider Randy Monzon   2021  3:00 PM Dk Cintron MD [facility]   2021  3:45 PM Gisselle Saldivar MD [address]   5/10/2021 10:30 AM [address] [facility], MED ONC NURSE [address] [facility] MED ONC [place]   5/10/2021 10:45 AM Rajendra Martel MD [facility]   2021  2:30 PM MD ZOË Alegria [facility]   2021  2:30 PM SCHEDULE, [facility] MED ONC LAB [address] [facility] MED ONC [place]   2021  2:15 PM [address] [facility], MED ONC NURSE [address] [facility] MED ONC [place]   2021  2:30 PM Alesha Valdez APRN - CNP [facility]       Zelalem Freeman RN

## 2021-05-05 ENCOUNTER — TELEPHONE (OUTPATIENT)
Dept: ONCOLOGY | Age: 51
End: 2021-05-05

## 2021-05-05 ENCOUNTER — CARE COORDINATION (OUTPATIENT)
Dept: CARE COORDINATION | Age: 51
End: 2021-05-05

## 2021-05-05 NOTE — TELEPHONE ENCOUNTER
Patient states she received a letter that the iron had been approved. She would like to know how soon she can get scheduled.

## 2021-05-07 ENCOUNTER — TELEPHONE (OUTPATIENT)
Dept: INFUSION THERAPY | Age: 51
End: 2021-05-07

## 2021-05-07 ENCOUNTER — OFFICE VISIT (OUTPATIENT)
Dept: BARIATRICS/WEIGHT MGMT | Age: 51
End: 2021-05-07
Payer: COMMERCIAL

## 2021-05-07 VITALS
BODY MASS INDEX: 47.17 KG/M2 | DIASTOLIC BLOOD PRESSURE: 74 MMHG | TEMPERATURE: 97.5 F | OXYGEN SATURATION: 96 % | HEIGHT: 64 IN | HEART RATE: 76 BPM | WEIGHT: 276.3 LBS | SYSTOLIC BLOOD PRESSURE: 102 MMHG

## 2021-05-07 DIAGNOSIS — E66.01 MORBID OBESITY WITH BMI OF 45.0-49.9, ADULT (HCC): ICD-10-CM

## 2021-05-07 DIAGNOSIS — Z98.84 STATUS POST BARIATRIC SURGERY: Primary | ICD-10-CM

## 2021-05-07 DIAGNOSIS — Z98.84 STATUS POST LAPAROSCOPIC SLEEVE GASTRECTOMY: ICD-10-CM

## 2021-05-07 PROCEDURE — G8417 CALC BMI ABV UP PARAM F/U: HCPCS | Performed by: SURGERY

## 2021-05-07 PROCEDURE — 3017F COLORECTAL CA SCREEN DOC REV: CPT | Performed by: SURGERY

## 2021-05-07 PROCEDURE — 1036F TOBACCO NON-USER: CPT | Performed by: SURGERY

## 2021-05-07 PROCEDURE — 99213 OFFICE O/P EST LOW 20 MIN: CPT | Performed by: SURGERY

## 2021-05-07 PROCEDURE — G8427 DOCREV CUR MEDS BY ELIG CLIN: HCPCS | Performed by: SURGERY

## 2021-05-07 RX ORDER — PROMETHAZINE HYDROCHLORIDE 25 MG/1
25 TABLET ORAL EVERY 6 HOURS PRN
Qty: 30 TABLET | Refills: 5 | Status: SHIPPED | OUTPATIENT
Start: 2021-05-07 | End: 2021-05-14

## 2021-05-07 ASSESSMENT — ENCOUNTER SYMPTOMS
CONSTIPATION: 0
PHOTOPHOBIA: 0
ABDOMINAL PAIN: 1
VOICE CHANGE: 0
DIARRHEA: 0
TROUBLE SWALLOWING: 0
SORE THROAT: 0
NAUSEA: 0
COUGH: 0
BACK PAIN: 1
WHEEZING: 0
ABDOMINAL DISTENTION: 1
SHORTNESS OF BREATH: 1
BLOOD IN STOOL: 0
COLOR CHANGE: 0
ANAL BLEEDING: 0
VOMITING: 0

## 2021-05-07 NOTE — PROGRESS NOTES
BARIATRIC SURGERY OFFICE NOTE    SUBJECTIVE:    Patient presenting today originally referred from Dawit Lombardi MD, for   Chief Complaint   Patient presents with    Follow-up     f/u 5th surg wm visit, conversionto RYGB   .    HPI: David Morgan is a 44 y.o. female being seen for follow up for bariatric weight loss surgery. Robbin'slast month weight gain/loss was -0.2 Lbs. ... Throwing up green stuff, and not tolerating diets. Iron IV, started. .     Thoroughly reviewed the patient's medical history, family history, social history and review of systems with the patient today in theoffice. Please see medical record for pertinent positives. Past Medical History:   Diagnosis Date    Acid reflux     Anemia     Anxiety     Arthritis     Hands, Back And Ankles    Bleeding ulcer 2014    \"I Had Ulcers In My Stomach And Colon\"/ per pt on 8/12/2019\"they said recently having some blood in my stomach- in July ( 2019)could not find where coming from \"    Bronchitis Last Episode 2014    Chronic back pain     Chronic pain     Sees Dr. Amy Fernandez COPD (chronic obstructive pulmonary disease) ([facility] [address]     Sees Dr. Cary Hawkins Depression     Fibromyalgia Dx 2013    GERD (gastroesophageal reflux disease)     H/O echocardiogram 08/11/2015    EF >55%. LA to be at the upper limit of normal in size. LV hypertrophy with normal LV systolic, but abnormal diastolic function. Normal valvular structures and function.  H/O echocardiogram 08/30/2018    EF 55-60%    Hiatal hernia     History of blood transfusion 09/2015 And 2018    No Reaction To Blood Transfusions Received    History of sleeve gastrectomy     [place] (hard of hearing)     Right Ear    Hx of cardiac catheterization 10/24/2010    Angiographically normal coronary arteries w/ normal LV function and wall motion.  Hx of transesophageal echocardiography (PILO) for monitoring 12/02/2010    EF 55-60%. WNL.    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 name   Melba Villarlk cysts     \"they found that I have a kidney cyst but not sure which side\" per pt on 7/15/2020    Lupus ([address] Dx 2013    \"Rheumatoid Lupus\"    MDRO (multiple drug resistant organisms) resistance     4 months ago chest from mediport    Morbid obesity ([address]     MRSA bacteremia     Nausea     \"Most Of The Time\"    Osteomyelitis of fifth toe of left foot ([address]     Pain management     Sees Dr. Carolee Barakat, Once A Month    Pancreatitis chronic Dx 2001    Pneumonia Dx 11-15    Seizures ([address]     Shortness of breath on exertion     Shortness of breath on exertion     Sleep apnea     Has CPAP\"no longer use the cpap since lost weight\"    Staph infection Dx 11-15    Left Foot    Staph infection Dx 11-15    \"Left Foot\"    Teeth missing     Upper And Lower    Thyroid disease     Wears glasses     To Read      Past Surgical History:   Procedure Laterality Date    APPENDECTOMY  02/1998    Done When Tubes And Ovaries Were Removed    CARDIAC CATHETERIZATION  10/24/2010    CATHETER REMOVAL N/A 7/16/2019    PORT REMOVAL performed by Ibrahima Steven MD at [address] [facility]  08/27/1991    CHOLECYSTECTOMY, LAPAROSCOPIC  1990's    COLONOSCOPY  Last Done In 2000's    DENTAL SURGERY      Teeth Extracted In Past    ENDOSCOPY, COLON, DIAGNOSTIC  Last Done In 2018    FOOT DEBRIDEMENT Left 6/16/2019    FIRST METATARSAL DEBRIDEMENT INCISION AND DRAINAGE. EXCISION OF ULCER.  RESECTION OF BONE. 1ST METATARSAL POWER LAVAGE AND BONE CEMENT performed by Major Melo DPM at [address] [employer] Left Last Done In 2016     Dr. Marcela Peralta, \" About 6 Surgeries Done Because Of Staph Infection\"  HIATAL HERNIA REPAIR N/A 2/12/2019    HERNIA HIATAL LAPAROSCOPIC ROBOTIC performed by Salma Miller MD at [facility]  10/1997    Partial Abdominal Hysterectomy    INSERTION / REMOVAL / REPLACEMENT VENOUS ACCESS CATHETER N/A 8/15/2019    PORT INSERTION performed by Salma Miller MD at [address] [facility]    removed after 6 months    LEG BIOPSY EXCISION Left 3/8/2021    LEFT THIGH LESION BIOPSY EXCISION performed by Salma Miller MD at [facility], PARTIAL Left 2008    Benign    OTHER SURGICAL HISTORY  02/1998    \"Tubes And Ovaries Removed, Appendectomy Also Done\"    OTHER SURGICAL HISTORY  Last Done 7-15-16    Mediport Insertion \"Total Of Six Done, Removed Last Mediport In 2014\"    PORT SURGERY N/A 1/15/2020    PORT REMOVAL performed by Salma Miller MD at [address] [facility] N/A 7/6/2020    PORT INSERTION performed by Salma Miller MD at [address]  N/A 2/12/2019    GASTRECTOMY SLEEVE LAPAROSCOPIC ROBOTIC performed by Salma Miller MD at [facility]  08/27/2018    UPPER GASTROINTESTINAL ENDOSCOPY N/A 4/2/2019    EGD CONTROL HEMORRHAGE with epi injection at bleeding site performed by Salma Miller MD at [place] [address] 6/19/2019    EGD DIAGNOSTIC ONLY performed by Salma Miller MD at [place] [address] N/A 7/22/2019    EGD DIAGNOSTIC ONLY performed by Alvaro Cotto MD at [place] [address] 10/14/2019    EGD DIAGNOSTIC ONLY performed by Salma Miller MD at [place] [address] N/A 7/17/2020    EGJ DILATATION BALLOON WITH 18-20 MM BALLOON, DILATED TO 20 MM. performed by Salma Miller MD at [facility] ENDOSCOPY     Current Outpatient Medications   Medication Sig Dispense Refill    promethazine (PHENERGAN) 25 mouth daily      Multiple Vitamin (MVI, BARIATRIC ADVANTAGE MULTI-FORMULA, CHEW TAB) Take 1 tablet by mouth daily 30 tablet 5    Calcium Citrate-Vitamin D (CALCIUM + VIT D, BARIATRIC ADVANTAGE, CHEWABLE TABLET) Take 1 tablet by mouth 2 times daily 120 tablet 5    levothyroxine (SYNTHROID) 125 MCG tablet Take 1 tablet by mouth Daily (Patient taking differently: Take 125 mcg by mouth nightly ) 30 tablet 0    gabapentin (NEURONTIN) 800 MG tablet Take 800 mg by mouth 3 times daily      PARoxetine (PAXIL) 30 MG tablet Take 50 mg by mouth nightly        No current facility-administered medications for this visit. Allergies   Allergen Reactions    Aspirin Palpitations     \"My Heart Rate Elevates\"    Shellfish-Derived Products Swelling    Toradol [Ketorolac Tromethamine] Rash    Compazine [Prochlorperazine]     Reglan [Metoclopramide] Itching           Review of Systems   Constitutional: Positive for fatigue. Negative for activity change, chills, diaphoresis and fever. HENT: Negative for sore throat, trouble swallowing and voice change. Eyes: Negative for photophobia and visual disturbance. Respiratory: Positive for shortness of breath. Negative for cough and wheezing. Cardiovascular: Positive for leg swelling. Negative for chest pain and palpitations. Gastrointestinal: Positive for abdominal distention and abdominal pain. Negative for anal bleeding, blood in stool, constipation, diarrhea, nausea and vomiting. Endocrine: Positive for polyphagia. Negative for cold intolerance, heat intolerance, polydipsia and polyuria. Genitourinary: Positive for urgency. Negative for dysuria, frequency and hematuria. Musculoskeletal: Positive for arthralgias, back pain and gait problem. Negative for joint swelling, myalgias and neck stiffness. Skin: Negative for color change and rash. Neurological: Negative for seizures, speech difficulty, light-headedness and numbness.    Hematological: Negative for surgery    2. Status post laparoscopic sleeve gastrectomy    3. Morbid obesity with BMI of 45.0-49.9, adult ([address]       Needs the RYGB, and will consent next month. Patient was encouraged to journal all food intake. Keep calorie level atapproximately 9495-3498. Protein intake is to be a minimum of 60-80 grams per day. Water drinking was encouraged with a goal of 64oz-128oz daily. Beverages to be calorie free except for milk. Every other beverage should bewater. They are to avoid soda. Continue to increase level of physical activity. I counseled the patient regarding diet and exercise, in preparation for her planned Robotic RYGB. Counting calories, complying with the dietitian's recommendations, and complying with the preoperative workup including the dietitian counseling, exercise physiologist counseling,cardiologist evaluation and pre-operative optimization, pulmonologist evaluation and pre operative optimization, pre operative EGD evaluation. The patient expressed understanding and willingness to comply nicely; allquestions and concerns addressed in details. Patient counseled on the risks, benefits, and alternatives oftreatment plan at length while in the office today. Patient states an understanding and willingness to proceed with the plan. Follow Up:  Return for Bariatric follow up: diet, exercise & weight loss, Follow up Symptoms.       Yi Granados MD, FACS, FICS  Member of the [employer]    [phone]    5/7/21

## 2021-05-08 ENCOUNTER — APPOINTMENT (OUTPATIENT)
Dept: GENERAL RADIOLOGY | Age: 51
DRG: 392 | End: 2021-05-08
Payer: COMMERCIAL

## 2021-05-08 ENCOUNTER — HOSPITAL ENCOUNTER (INPATIENT)
Age: 51
LOS: 2 days | Discharge: HOME | DRG: 392 | End: 2021-05-11
Attending: EMERGENCY MEDICINE | Admitting: INTERNAL MEDICINE
Payer: COMMERCIAL

## 2021-05-08 DIAGNOSIS — R11.2 INTRACTABLE NAUSEA AND VOMITING: Primary | ICD-10-CM

## 2021-05-08 LAB
BACTERIA: ABNORMAL /HPF
BILIRUBIN URINE: NEGATIVE MG/DL
BLOOD, URINE: NEGATIVE
CLARITY: ABNORMAL
COLOR: YELLOW
GLUCOSE, URINE: NEGATIVE MG/DL
HYALINE CASTS: 2 /LPF
KETONES, URINE: NEGATIVE MG/DL
LEUKOCYTE ESTERASE, URINE: NEGATIVE
NITRITE URINE, QUANTITATIVE: NEGATIVE
PH, URINE: 5 (ref 5–8)
PROTEIN UA: NEGATIVE MG/DL
RBC URINE: <1 /HPF (ref 0–6)
SPECIFIC GRAVITY UA: 1.02 (ref 1–1.03)
SQUAMOUS EPITHELIAL: 3 /HPF
TRANSITIONAL EPITHELIAL: <1 /HPF
TRICHOMONAS: ABNORMAL /HPF
UROBILINOGEN, URINE: NEGATIVE MG/DL (ref 0.2–1)
WBC UA: 2 /HPF (ref 0–5)

## 2021-05-08 PROCEDURE — 71046 X-RAY EXAM CHEST 2 VIEWS: CPT

## 2021-05-08 PROCEDURE — 96376 TX/PRO/DX INJ SAME DRUG ADON: CPT

## 2021-05-08 PROCEDURE — 6360000002 HC RX W HCPCS: Performed by: EMERGENCY MEDICINE

## 2021-05-08 PROCEDURE — 83690 ASSAY OF LIPASE: CPT

## 2021-05-08 PROCEDURE — 96372 THER/PROPH/DIAG INJ SC/IM: CPT

## 2021-05-08 PROCEDURE — 96374 THER/PROPH/DIAG INJ IV PUSH: CPT

## 2021-05-08 PROCEDURE — 81001 URINALYSIS AUTO W/SCOPE: CPT

## 2021-05-08 PROCEDURE — 99285 EMERGENCY DEPT VISIT HI MDM: CPT

## 2021-05-08 PROCEDURE — 83880 ASSAY OF NATRIURETIC PEPTIDE: CPT

## 2021-05-08 PROCEDURE — 84484 ASSAY OF TROPONIN QUANT: CPT

## 2021-05-08 PROCEDURE — 80076 HEPATIC FUNCTION PANEL: CPT

## 2021-05-08 PROCEDURE — 96375 TX/PRO/DX INJ NEW DRUG ADDON: CPT

## 2021-05-08 PROCEDURE — 93005 ELECTROCARDIOGRAM TRACING: CPT | Performed by: EMERGENCY MEDICINE

## 2021-05-08 PROCEDURE — 2580000003 HC RX 258: Performed by: EMERGENCY MEDICINE

## 2021-05-08 RX ORDER — PROMETHAZINE HYDROCHLORIDE 25 MG/ML
12.5 INJECTION, SOLUTION INTRAMUSCULAR; INTRAVENOUS ONCE
Status: COMPLETED | OUTPATIENT
Start: 2021-05-09 | End: 2021-05-08

## 2021-05-08 RX ORDER — MORPHINE SULFATE 4 MG/ML
4 INJECTION, SOLUTION INTRAMUSCULAR; INTRAVENOUS ONCE
Status: COMPLETED | OUTPATIENT
Start: 2021-05-09 | End: 2021-05-08

## 2021-05-08 RX ORDER — ONDANSETRON 2 MG/ML
4 INJECTION INTRAMUSCULAR; INTRAVENOUS ONCE
Status: COMPLETED | OUTPATIENT
Start: 2021-05-08 | End: 2021-05-08

## 2021-05-08 RX ORDER — 0.9 % SODIUM CHLORIDE 0.9 %
1000 INTRAVENOUS SOLUTION INTRAVENOUS ONCE
Status: COMPLETED | OUTPATIENT
Start: 2021-05-08 | End: 2021-05-09

## 2021-05-08 RX ORDER — MORPHINE SULFATE 4 MG/ML
4 INJECTION, SOLUTION INTRAMUSCULAR; INTRAVENOUS ONCE
Status: COMPLETED | OUTPATIENT
Start: 2021-05-08 | End: 2021-05-08

## 2021-05-08 RX ADMIN — SODIUM CHLORIDE 1000 ML: 9 INJECTION, SOLUTION INTRAVENOUS at 23:07

## 2021-05-08 RX ADMIN — PROMETHAZINE HYDROCHLORIDE 12.5 MG: 25 INJECTION INTRAMUSCULAR; INTRAVENOUS at 23:56

## 2021-05-08 RX ADMIN — MORPHINE SULFATE 4 MG: 4 INJECTION, SOLUTION INTRAMUSCULAR; INTRAVENOUS at 23:57

## 2021-05-08 RX ADMIN — MORPHINE SULFATE 4 MG: 4 INJECTION, SOLUTION INTRAMUSCULAR; INTRAVENOUS at 23:07

## 2021-05-08 RX ADMIN — ONDANSETRON 4 MG: 2 INJECTION INTRAMUSCULAR; INTRAVENOUS at 23:07

## 2021-05-08 ASSESSMENT — PAIN SCALES - GENERAL
PAINLEVEL_OUTOF10: 7
PAINLEVEL_OUTOF10: 7

## 2021-05-09 ENCOUNTER — APPOINTMENT (OUTPATIENT)
Dept: CT IMAGING | Age: 51
DRG: 392 | End: 2021-05-09
Payer: COMMERCIAL

## 2021-05-09 PROBLEM — R11.2 NAUSEA AND VOMITING: Status: ACTIVE | Noted: 2021-05-09

## 2021-05-09 LAB
ALBUMIN SERPL-MCNC: 4.4 GM/DL (ref 3.4–5)
ALP BLD-CCNC: 93 IU/L (ref 40–129)
ALT SERPL-CCNC: 15 U/L (ref 10–40)
ANION GAP SERPL CALCULATED.3IONS-SCNC: 6 MMOL/L (ref 4–16)
AST SERPL-CCNC: 22 IU/L (ref 15–37)
BASOPHILS ABSOLUTE: 0 K/CU MM
BASOPHILS RELATIVE PERCENT: 0.5 % (ref 0–1)
BILIRUB SERPL-MCNC: 0.2 MG/DL (ref 0–1)
BILIRUBIN DIRECT: 0.2 MG/DL (ref 0–0.3)
BILIRUBIN, INDIRECT: 0 MG/DL (ref 0–0.7)
BUN BLDV-MCNC: 18 MG/DL (ref 6–23)
CALCIUM SERPL-MCNC: 10.6 MG/DL (ref 8.3–10.6)
CHLORIDE BLD-SCNC: 103 MMOL/L (ref 99–110)
CO2: 27 MMOL/L (ref 21–32)
CREAT SERPL-MCNC: 0.9 MG/DL (ref 0.6–1.1)
DIFFERENTIAL TYPE: ABNORMAL
EKG ATRIAL RATE: 65 BPM
EKG DIAGNOSIS: NORMAL
EKG P AXIS: 42 DEGREES
EKG P-R INTERVAL: 196 MS
EKG Q-T INTERVAL: 404 MS
EKG QRS DURATION: 94 MS
EKG QTC CALCULATION (BAZETT): 420 MS
EKG R AXIS: -21 DEGREES
EKG T AXIS: 20 DEGREES
EKG VENTRICULAR RATE: 65 BPM
EOSINOPHILS ABSOLUTE: 0.2 K/CU MM
EOSINOPHILS RELATIVE PERCENT: 5.8 % (ref 0–3)
GFR AFRICAN AMERICAN: >60 ML/MIN/1.73M2
GFR NON-AFRICAN AMERICAN: >60 ML/MIN/1.73M2
GLUCOSE BLD-MCNC: 88 MG/DL (ref 70–99)
HCT VFR BLD CALC: 36.7 % (ref 37–47)
HEMOGLOBIN: 11.1 GM/DL (ref 12.5–16)
IMMATURE NEUTROPHIL %: 0 % (ref 0–0.43)
LACTATE: 0.8 MMOL/L (ref 0.4–2)
LIPASE: 24 IU/L (ref 13–60)
LYMPHOCYTES ABSOLUTE: 1.6 K/CU MM
LYMPHOCYTES RELATIVE PERCENT: 38 % (ref 24–44)
MCH RBC QN AUTO: 24.9 PG (ref 27–31)
MCHC RBC AUTO-ENTMCNC: 30.2 % (ref 32–36)
MCV RBC AUTO: 82.3 FL (ref 78–100)
MONOCYTES ABSOLUTE: 0.3 K/CU MM
MONOCYTES RELATIVE PERCENT: 6.3 % (ref 0–4)
NUCLEATED RBC %: 0 %
PDW BLD-RTO: 13.7 % (ref 11.7–14.9)
PLATELET # BLD: 196 K/CU MM (ref 140–440)
PMV BLD AUTO: 10.4 FL (ref 7.5–11.1)
POTASSIUM SERPL-SCNC: 3.9 MMOL/L (ref 3.5–5.1)
PRO-BNP: 142.6 PG/ML
RBC # BLD: 4.46 M/CU MM (ref 4.2–5.4)
SEGMENTED NEUTROPHILS ABSOLUTE COUNT: 2 K/CU MM
SEGMENTED NEUTROPHILS RELATIVE PERCENT: 49.4 % (ref 36–66)
SODIUM BLD-SCNC: 136 MMOL/L (ref 135–145)
TOTAL IMMATURE NEUTOROPHIL: 0 K/CU MM
TOTAL NUCLEATED RBC: 0 K/CU MM
TOTAL PROTEIN: 7.3 GM/DL (ref 6.4–8.2)
TROPONIN T: <0.01 NG/ML
WBC # BLD: 4.1 K/CU MM (ref 4–10.5)

## 2021-05-09 PROCEDURE — 85025 COMPLETE CBC W/AUTO DIFF WBC: CPT

## 2021-05-09 PROCEDURE — 6370000000 HC RX 637 (ALT 250 FOR IP): Performed by: INTERNAL MEDICINE

## 2021-05-09 PROCEDURE — 2500000003 HC RX 250 WO HCPCS: Performed by: INTERNAL MEDICINE

## 2021-05-09 PROCEDURE — 2580000003 HC RX 258: Performed by: INTERNAL MEDICINE

## 2021-05-09 PROCEDURE — 6360000002 HC RX W HCPCS: Performed by: EMERGENCY MEDICINE

## 2021-05-09 PROCEDURE — 1200000000 HC SEMI PRIVATE

## 2021-05-09 PROCEDURE — 84484 ASSAY OF TROPONIN QUANT: CPT

## 2021-05-09 PROCEDURE — 83605 ASSAY OF LACTIC ACID: CPT

## 2021-05-09 PROCEDURE — 74177 CT ABD & PELVIS W/CONTRAST: CPT

## 2021-05-09 PROCEDURE — 6360000002 HC RX W HCPCS: Performed by: INTERNAL MEDICINE

## 2021-05-09 PROCEDURE — 6360000004 HC RX CONTRAST MEDICATION: Performed by: EMERGENCY MEDICINE

## 2021-05-09 PROCEDURE — 82248 BILIRUBIN DIRECT: CPT

## 2021-05-09 PROCEDURE — 80053 COMPREHEN METABOLIC PANEL: CPT

## 2021-05-09 PROCEDURE — 93010 ELECTROCARDIOGRAM REPORT: CPT | Performed by: INTERNAL MEDICINE

## 2021-05-09 RX ORDER — ALBUTEROL SULFATE 90 UG/1
2 AEROSOL, METERED RESPIRATORY (INHALATION) EVERY 4 HOURS PRN
Status: DISCONTINUED | OUTPATIENT
Start: 2021-05-09 | End: 2021-05-11 | Stop reason: HOSPADM

## 2021-05-09 RX ORDER — ALBUTEROL SULFATE 2.5 MG/3ML
2.5 SOLUTION RESPIRATORY (INHALATION) EVERY 6 HOURS PRN
Status: DISCONTINUED | OUTPATIENT
Start: 2021-05-09 | End: 2021-05-11 | Stop reason: HOSPADM

## 2021-05-09 RX ORDER — PANTOPRAZOLE SODIUM 40 MG/10ML
40 INJECTION, POWDER, LYOPHILIZED, FOR SOLUTION INTRAVENOUS DAILY
Status: DISCONTINUED | OUTPATIENT
Start: 2021-05-09 | End: 2021-05-11 | Stop reason: HOSPADM

## 2021-05-09 RX ORDER — ACETAMINOPHEN 325 MG/1
650 TABLET ORAL EVERY 6 HOURS PRN
Status: DISCONTINUED | OUTPATIENT
Start: 2021-05-09 | End: 2021-05-11 | Stop reason: HOSPADM

## 2021-05-09 RX ORDER — MULTIVITAMIN WITH IRON
1 TABLET ORAL DAILY
Status: DISCONTINUED | OUTPATIENT
Start: 2021-05-09 | End: 2021-05-11 | Stop reason: HOSPADM

## 2021-05-09 RX ORDER — OXYCODONE HYDROCHLORIDE AND ACETAMINOPHEN 5; 325 MG/1; MG/1
1 TABLET ORAL EVERY 4 HOURS PRN
Status: DISCONTINUED | OUTPATIENT
Start: 2021-05-09 | End: 2021-05-11 | Stop reason: HOSPADM

## 2021-05-09 RX ORDER — SODIUM CHLORIDE 9 MG/ML
25 INJECTION, SOLUTION INTRAVENOUS PRN
Status: DISCONTINUED | OUTPATIENT
Start: 2021-05-09 | End: 2021-05-11 | Stop reason: HOSPADM

## 2021-05-09 RX ORDER — HYDROXYZINE PAMOATE 25 MG/1
50 CAPSULE ORAL EVERY 6 HOURS PRN
Status: DISCONTINUED | OUTPATIENT
Start: 2021-05-09 | End: 2021-05-11 | Stop reason: HOSPADM

## 2021-05-09 RX ORDER — PROMETHAZINE HYDROCHLORIDE 25 MG/ML
6.25 INJECTION, SOLUTION INTRAMUSCULAR; INTRAVENOUS EVERY 6 HOURS PRN
Status: DISCONTINUED | OUTPATIENT
Start: 2021-05-09 | End: 2021-05-11 | Stop reason: HOSPADM

## 2021-05-09 RX ORDER — LORAZEPAM 2 MG/ML
1 INJECTION INTRAMUSCULAR ONCE
Status: COMPLETED | OUTPATIENT
Start: 2021-05-09 | End: 2021-05-09

## 2021-05-09 RX ORDER — SODIUM CHLORIDE 0.9 % (FLUSH) 0.9 %
5-40 SYRINGE (ML) INJECTION 2 TIMES DAILY
Status: DISCONTINUED | OUTPATIENT
Start: 2021-05-09 | End: 2021-05-09

## 2021-05-09 RX ORDER — PROMETHAZINE HYDROCHLORIDE 12.5 MG/1
12.5 TABLET ORAL EVERY 6 HOURS PRN
Status: DISCONTINUED | OUTPATIENT
Start: 2021-05-09 | End: 2021-05-11 | Stop reason: HOSPADM

## 2021-05-09 RX ORDER — HYDROMORPHONE HCL 110MG/55ML
0.5 PATIENT CONTROLLED ANALGESIA SYRINGE INTRAVENOUS ONCE
Status: COMPLETED | OUTPATIENT
Start: 2021-05-09 | End: 2021-05-09

## 2021-05-09 RX ORDER — ACETAMINOPHEN 650 MG/1
650 SUPPOSITORY RECTAL EVERY 6 HOURS PRN
Status: DISCONTINUED | OUTPATIENT
Start: 2021-05-09 | End: 2021-05-11 | Stop reason: HOSPADM

## 2021-05-09 RX ORDER — ALBUTEROL SULFATE 90 UG/1
2 AEROSOL, METERED RESPIRATORY (INHALATION) 4 TIMES DAILY
Status: DISCONTINUED | OUTPATIENT
Start: 2021-05-09 | End: 2021-05-09

## 2021-05-09 RX ORDER — HYDROXYZINE HYDROCHLORIDE 50 MG/ML
50 INJECTION, SOLUTION INTRAMUSCULAR ONCE
Status: COMPLETED | OUTPATIENT
Start: 2021-05-09 | End: 2021-05-10

## 2021-05-09 RX ORDER — SODIUM CHLORIDE 0.9 % (FLUSH) 0.9 %
5-40 SYRINGE (ML) INJECTION PRN
Status: DISCONTINUED | OUTPATIENT
Start: 2021-05-09 | End: 2021-05-11 | Stop reason: HOSPADM

## 2021-05-09 RX ORDER — LEVETIRACETAM 500 MG/1
1000 TABLET ORAL 2 TIMES DAILY
Status: DISCONTINUED | OUTPATIENT
Start: 2021-05-09 | End: 2021-05-11 | Stop reason: HOSPADM

## 2021-05-09 RX ORDER — ONDANSETRON 2 MG/ML
4 INJECTION INTRAMUSCULAR; INTRAVENOUS EVERY 6 HOURS PRN
Status: DISCONTINUED | OUTPATIENT
Start: 2021-05-09 | End: 2021-05-11 | Stop reason: HOSPADM

## 2021-05-09 RX ORDER — CLONIDINE HYDROCHLORIDE 0.1 MG/1
0.1 TABLET ORAL 2 TIMES DAILY
Status: DISCONTINUED | OUTPATIENT
Start: 2021-05-09 | End: 2021-05-11 | Stop reason: HOSPADM

## 2021-05-09 RX ORDER — DEXTROSE, SODIUM CHLORIDE, AND POTASSIUM CHLORIDE 5; .45; .15 G/100ML; G/100ML; G/100ML
INJECTION INTRAVENOUS CONTINUOUS
Status: DISPENSED | OUTPATIENT
Start: 2021-05-09 | End: 2021-05-09

## 2021-05-09 RX ORDER — SODIUM CHLORIDE 0.9 % (FLUSH) 0.9 %
5-40 SYRINGE (ML) INJECTION EVERY 12 HOURS SCHEDULED
Status: DISCONTINUED | OUTPATIENT
Start: 2021-05-09 | End: 2021-05-11 | Stop reason: HOSPADM

## 2021-05-09 RX ORDER — LEVOTHYROXINE SODIUM 0.12 MG/1
125 TABLET ORAL NIGHTLY
Status: DISCONTINUED | OUTPATIENT
Start: 2021-05-09 | End: 2021-05-11 | Stop reason: HOSPADM

## 2021-05-09 RX ORDER — ESTRADIOL 1 MG/1
0.5 TABLET ORAL DAILY
Status: DISCONTINUED | OUTPATIENT
Start: 2021-05-09 | End: 2021-05-11 | Stop reason: HOSPADM

## 2021-05-09 RX ORDER — POLYETHYLENE GLYCOL 3350 17 G/17G
17 POWDER, FOR SOLUTION ORAL DAILY PRN
Status: DISCONTINUED | OUTPATIENT
Start: 2021-05-09 | End: 2021-05-11 | Stop reason: HOSPADM

## 2021-05-09 RX ORDER — GABAPENTIN 400 MG/1
800 CAPSULE ORAL 3 TIMES DAILY
Status: DISCONTINUED | OUTPATIENT
Start: 2021-05-09 | End: 2021-05-11 | Stop reason: HOSPADM

## 2021-05-09 RX ORDER — ATENOLOL 25 MG/1
25 TABLET ORAL DAILY
Status: DISCONTINUED | OUTPATIENT
Start: 2021-05-09 | End: 2021-05-11 | Stop reason: HOSPADM

## 2021-05-09 RX ORDER — FERROUS SULFATE 325(65) MG
325 TABLET ORAL
Status: DISCONTINUED | OUTPATIENT
Start: 2021-05-09 | End: 2021-05-11 | Stop reason: HOSPADM

## 2021-05-09 RX ADMIN — HYDROMORPHONE HYDROCHLORIDE 0.5 MG: 2 INJECTION INTRAMUSCULAR; INTRAVENOUS; SUBCUTANEOUS at 03:17

## 2021-05-09 RX ADMIN — POTASSIUM CHLORIDE, DEXTROSE MONOHYDRATE AND SODIUM CHLORIDE: 150; 5; 450 INJECTION, SOLUTION INTRAVENOUS at 04:07

## 2021-05-09 RX ADMIN — IOPAMIDOL 75 ML: 755 INJECTION, SOLUTION INTRAVENOUS at 01:32

## 2021-05-09 RX ADMIN — HYDROMORPHONE HYDROCHLORIDE 0.5 MG: 2 INJECTION INTRAMUSCULAR; INTRAVENOUS; SUBCUTANEOUS at 01:44

## 2021-05-09 RX ADMIN — ONDANSETRON 4 MG: 2 INJECTION INTRAMUSCULAR; INTRAVENOUS at 11:50

## 2021-05-09 RX ADMIN — IRON SUCROSE 500 MG: 20 INJECTION, SOLUTION INTRAVENOUS at 10:22

## 2021-05-09 RX ADMIN — HYDROMORPHONE HYDROCHLORIDE 0.5 MG: 1 INJECTION, SOLUTION INTRAMUSCULAR; INTRAVENOUS; SUBCUTANEOUS at 04:49

## 2021-05-09 RX ADMIN — HYDROMORPHONE HYDROCHLORIDE 0.5 MG: 1 INJECTION, SOLUTION INTRAMUSCULAR; INTRAVENOUS; SUBCUTANEOUS at 10:22

## 2021-05-09 RX ADMIN — HYDROMORPHONE HYDROCHLORIDE 0.5 MG: 1 INJECTION, SOLUTION INTRAMUSCULAR; INTRAVENOUS; SUBCUTANEOUS at 20:49

## 2021-05-09 RX ADMIN — ONDANSETRON 4 MG: 2 INJECTION INTRAMUSCULAR; INTRAVENOUS at 04:49

## 2021-05-09 RX ADMIN — HYDROMORPHONE HYDROCHLORIDE 0.5 MG: 1 INJECTION, SOLUTION INTRAMUSCULAR; INTRAVENOUS; SUBCUTANEOUS at 15:38

## 2021-05-09 RX ADMIN — ENOXAPARIN SODIUM 40 MG: 40 INJECTION SUBCUTANEOUS at 08:51

## 2021-05-09 RX ADMIN — PROMETHAZINE HYDROCHLORIDE 6.25 MG: 25 INJECTION INTRAMUSCULAR; INTRAVENOUS at 20:49

## 2021-05-09 RX ADMIN — LORAZEPAM 1 MG: 2 INJECTION INTRAMUSCULAR; INTRAVENOUS at 00:14

## 2021-05-09 ASSESSMENT — PAIN DESCRIPTION - ORIENTATION: ORIENTATION: RIGHT;MID

## 2021-05-09 ASSESSMENT — PAIN DESCRIPTION - PROGRESSION
CLINICAL_PROGRESSION: NOT CHANGED

## 2021-05-09 ASSESSMENT — PAIN DESCRIPTION - DESCRIPTORS: DESCRIPTORS: ACHING

## 2021-05-09 ASSESSMENT — PAIN DESCRIPTION - LOCATION
LOCATION: ABDOMEN
LOCATION: ABDOMEN

## 2021-05-09 ASSESSMENT — PAIN SCALES - GENERAL
PAINLEVEL_OUTOF10: 7
PAINLEVEL_OUTOF10: 7
PAINLEVEL_OUTOF10: 8

## 2021-05-09 ASSESSMENT — PAIN DESCRIPTION - PAIN TYPE: TYPE: CHRONIC PAIN

## 2021-05-09 ASSESSMENT — PAIN DESCRIPTION - ONSET: ONSET: ON-GOING

## 2021-05-09 NOTE — ED PROVIDER NOTES
Emergency Department Encounter    Patient: Caleb Barahona  MRN: [id number]  : 1970  Date of Evaluation: 2021  ED Provider:  Daniel Vences    Triage Chief Complaint:   Emesis and Abdominal Pain (x 2 days)    [place]:  Caleb Barahona is a 44 y.o. female with pmh of lupus on prednisone that presents with epigastric abdominal pain. Hx of gastric sleeve. Was recently admitted with intractable nausea and vomiting. Scheduled for gastric bypass. Has not eaten for 2-3 days. Has only had pedialyte and ice chips. Hx of chronic pancreatitis; pain feels similar. Has had 5-6 episodes per day of vomiting. No blood. Appears yellow. Pain is 7/10 constant aching with intermittent sharp pain. No fever or chills. No chest pain or sob. No cough or congestion.      ROS - see HPI, below listed is current ROS at time of my eval:  General:  No fevers, no chills, no weakness  Eyes:  no discharge  ENT:  No sore throat, no nasal congestion  Cardiovascular:  No chest pain, no palpitations  Respiratory:  No shortness of breath, no cough, no wheezing  Gastrointestinal:  + pain, + nausea, no vomiting, no diarrhea  Musculoskeletal:  No muscle pain, no joint pain  Skin:  No rash, no pruritis  Neurologic:  no headache  Genitourinary:  No dysuria, no hematuria  Endocrine:  No unexpected weight gain, no unexpected weight loss  Extremities:  no edema, no pain    Past Medical History:   Diagnosis Date    Acid reflux     Anemia     Anxiety     Arthritis     Hands, Back And Ankles    Bleeding ulcer     \"I Had Ulcers In My Stomach And Colon\"/ per pt on 2019\"they said recently having some blood in my stomach- in July ( )could not find where coming from \"    Bronchitis Last Episode 2014    Chronic back pain     Chronic pain     [facility]    COPD (chronic obstructive pulmonary disease) ([facility])     Sees Dr. Tuan Espinoza Depression     Fibromyalgia Dx 2013    GERD (gastroesophageal reflux Ovaries Were Removed    CARDIAC CATHETERIZATION  10/24/2010    CATHETER REMOVAL N/A 7/16/2019    PORT REMOVAL performed by Dk Cintron MD at [address] [facility]  08/27/1991    CHOLECYSTECTOMY, LAPAROSCOPIC  1990's    COLONOSCOPY  Last Done In 2000's   [facility] DENTAL SURGERY      Teeth Extracted In Past    ENDOSCOPY, COLON, DIAGNOSTIC  Last Done In 2018    FOOT DEBRIDEMENT Left 6/16/2019    FIRST METATARSAL DEBRIDEMENT INCISION AND DRAINAGE. EXCISION OF ULCER.  RESECTION OF BONE. 1ST METATARSAL POWER LAVAGE AND BONE CEMENT performed by Joya Bumpers, DPM at [address] Left Last Done In 2016     Dr. Kathyleen Lombard, \" About 6 Surgeries Done Because Of Staph Infection\"    HIATAL HERNIA REPAIR N/A 2/12/2019    HERNIA HIATAL LAPAROSCOPIC ROBOTIC performed by Dk Cintron MD at [address] [place] [address]  10/1997    Partial Abdominal Hysterectomy    INSERTION / REMOVAL / REPLACEMENT VENOUS ACCESS CATHETER N/A 8/15/2019    PORT INSERTION performed by Dk Cintron MD at [address] [facility] [place]    removed after 6 months    LEG BIOPSY EXCISION Left 3/8/2021    LEFT THIGH LESION BIOPSY EXCISION performed by Dk Cintron MD at [facility], PARTIAL Left 2008    Benign    OTHER SURGICAL HISTORY  02/1998    \"Tubes And Ovaries Removed, Appendectomy Also Done\"    OTHER SURGICAL HISTORY  Last Done 7-15-16    Mediport Insertion \"Total Of Six Done, Removed Last Mediport In 2014\"    PORT SURGERY N/A 1/15/2020    PORT REMOVAL performed by Dk Cintron MD at [address] N/A 7/6/2020    PORT INSERTION performed by Dk Cintron MD at [address] [facility] [address] N/A 2/12/2019    GASTRECTOMY SLEEVE LAPAROSCOPIC ROBOTIC [facility]  08/27/2018    UPPER GASTROINTESTINAL ENDOSCOPY N/A 4/2/2019    EGD CONTROL HEMORRHAGE with epi injection at bleeding site performed by Angela Baxter MD at [address] [place] [address] 6/19/2019    EGD DIAGNOSTIC ONLY performed by Anegla Baxter MD at [address] [place] [address] N/A 7/22/2019    EGD DIAGNOSTIC ONLY performed by Lou Norman MD at [address] [place] [address] N/A 10/14/2019    EGD DIAGNOSTIC ONLY performed by Angela Baxter MD at [address] [place] [address] N/A 7/17/2020    EGJ DILATATION BALLOON WITH 18-20 MM BALLOON, DILATED TO 20 MM. performed by Angela Baxter MD at [place] [address] History   Problem Relation Age of Onset    Diabetes Mother         \"Borderline Diabetes\"    High Blood Pressure Mother     Obesity Mother     Arthritis Mother     Heart Disease Mother     High Cholesterol Mother     Vision Loss Mother     Diabetes Father     High Blood Pressure Father     Asthma Father     Cancer Father         prostate cancer    High Blood Pressure Brother     Asthma Son     Vision Loss Son     Lupus Daughter     Other Daughter         \"Alot Of Female Problems\"     Social History     Socioeconomic History    Marital status:      Spouse name: Not on file    Number of children: Not on file    Years of education: Not on file    Highest education level: Not on file   Occupational History    Not on file   Social Needs    Financial resource strain: Not on file    Food insecurity     Worry: Not on file     Inability: Not on file    Transportation needs     Medical: Not on file     Non-medical: Not on file   Tobacco Use    Smoking status: Never Smoker    Smokeless tobacco: Never Used   Substance and Sexual Activity    Alcohol use: No     Alcohol/week: 0.0 standard drinks    Drug use: No    Sexual activity: Not Currently   Lifestyle    Physical activity     Days per week: Not on file     Minutes per session: Not on file    Stress: Not on file   Relationships    Social connections     Talks on phone: Not on file     Gets together: Not on file     Attends Worship service: Not on file     Active member of club or organization: Not on file     Attends meetings of clubs or organizations: Not on file     Relationship status: Not on file    Intimate partner violence     Fear of current or ex partner: Not on file     Emotionally abused: Not on file     Physically abused: Not on file     Forced sexual activity: Not on file   Other Topics Concern    Not on file   Social History Narrative    Not on file     Current Facility-Administered Medications   Medication Dose Route Frequency Provider Last Rate Last Admin    sodium chloride flush 0.9 % injection 5-40 mL  5-40 mL Intravenous BID Andrzej Trujillo MD         Current Outpatient Medications   Medication Sig Dispense Refill    promethazine (PHENERGAN) 25 MG tablet Take 1 tablet by mouth every 6 hours as needed for Nausea 30 tablet 5    promethazine (PHENERGAN) 25 MG tablet Take 1 tablet by mouth every 8 hours as needed for Nausea 30 tablet 0    albuterol (PROVENTIL) (2.5 MG/3ML) 0.083% nebulizer solution Take 3 mLs by nebulization every 6 hours as needed for Wheezing 120 each 0    hydrOXYzine (VISTARIL) 50 MG capsule Take 1 capsule by mouth every 6 hours as needed for Anxiety 30 capsule 0    betamethasone valerate (VALISONE) 0.1 % ointment APPLY OINTMENT TO AFFECTED AREA TWICE DAILY TO HANDS AND ELBOWS FOR 21 DAYS      EQ PINK-BISMUTH 262 MG chewable tablet CHEW & SWALLOW 2 TABLETS BY MOUTH 4 TIMES DAILY BEFORE MEAL(S) AND NIGHTLY FOR 5 DAYS      cloNIDine (CATAPRES) 0.1 MG tablet TAKE 1 TABLET BY MOUTH TWICE DAILY      mupirocin (BACTROBAN) 2 % ointment APPLY A SMALL AMOUNT TO THE AFFECTED AREA TWICE DAILY FOR 5 TO 10 DAYS      ondansetron (ZOFRAN-ODT) 4 MG disintegrating tablet DISSOLVE 1 TABLET IN MOUTH EVERY 8 HOURS AS NEEDED FOR NAUSEA OR VOMITING      scopolamine (TRANSDERM-SCOP) transdermal patch APPLY 1 PATCH Temp Source Oral      SpO2 98 %      Weight       Height       Head Circumference       Peak Flow       Pain Score       Pain Loc       Pain Edu? Excl. in [address]? My pulse ox interpretation is - normal    General appearance:  No acute distress. Skin:  Warm. Dry. No rash. Neck:  Trachea midline. Heart:  Regular rate and rhythm, normal S1 & S2.    Perfusion:  intact  Respiratory:  Lungs clear to auscultation bilaterally. Respirations nonlabored. Abdominal:  +epigastric tenderness to palpation, no rebound guarding or rigidity, neg Champion's sign, neg McBurney's point tenderness to palpation, normal bowel sounds, no masses, no organomegaly, no pulsatile masses  Back:  No CVA TTP  Extremity:    normal ROM   Neurological:  Alert and oriented times 3.       I have reviewed and interpreted all of the currently available lab results from this visit (if applicable):  Results for orders placed or performed during the hospital encounter of 05/08/21   Lipase   Result Value Ref Range    Lipase 24 13 - 60 IU/L   Brain Natriuretic Peptide   Result Value Ref Range    Pro-.6 <300 PG/ML   Lactic Acid, Plasma   Result Value Ref Range    Lactate 0.8 0.4 - 2.0 mMOL/L   Urinalysis Reflex to Culture    Specimen: Urine   Result Value Ref Range    Color, UA YELLOW YELLOW    Clarity, UA HAZY (A) CLEAR    Glucose, Urine NEGATIVE NEGATIVE MG/DL    Bilirubin Urine NEGATIVE NEGATIVE MG/DL    Ketones, Urine NEGATIVE NEGATIVE MG/DL    Specific Gravity, UA 1.016 1.001 - 1.035    Blood, Urine NEGATIVE NEGATIVE    pH, Urine 5.0 5.0 - 8.0    Protein, UA NEGATIVE NEGATIVE MG/DL    Urobilinogen, Urine NEGATIVE 0.2 - 1.0 MG/DL    Nitrite Urine, Quantitative NEGATIVE NEGATIVE    Leukocyte Esterase, Urine NEGATIVE NEGATIVE    RBC, UA <1 0 - 6 /HPF    WBC, UA 2 0 - 5 /HPF    Bacteria, UA RARE (A) NEGATIVE /HPF    Squam Epithel, UA 3 /HPF    Trans Epithel, UA <1 /HPF    Trichomonas, UA NONE SEEN NONE SEEN /HPF    Hyaline Casts, UA 2 /LPF   CBC Auto Differential   Result Value Ref Range    WBC 4.1 4.0 - 10.5 K/CU MM    RBC 4.46 4.2 - 5.4 M/CU MM    Hemoglobin 11.1 (L) 12.5 - 16.0 GM/DL    Hematocrit 36.7 (L) 37 - 47 %    MCV 82.3 78 - 100 FL    MCH 24.9 (L) 27 - 31 PG    MCHC 30.2 (L) 32.0 - 36.0 %    RDW 13.7 11.7 - 14.9 %    Platelets 183 214 - 365 K/CU MM    MPV 10.4 7.5 - 11.1 FL    Differential Type AUTOMATED DIFFERENTIAL     Segs Relative 49.4 36 - 66 %    Lymphocytes % 38.0 24 - 44 %    Monocytes % 6.3 (H) 0 - 4 %    Eosinophils % 5.8 (H) 0 - 3 %    Basophils % 0.5 0 - 1 %    Segs Absolute 2.0 K/CU MM    Lymphocytes Absolute 1.6 K/CU MM    Monocytes Absolute 0.3 K/CU MM    Eosinophils Absolute 0.2 K/CU MM    Basophils Absolute 0.0 K/CU MM    Nucleated RBC % 0.0 %    Total Nucleated RBC 0.0 K/CU MM    Total Immature Neutrophil 0.00 K/CU MM    Immature Neutrophil % 0.0 0 - 0.43 %   Basic Metabolic Panel w/ Reflex to MG   Result Value Ref Range    Sodium 136 135 - 145 MMOL/L    Potassium 3.9 3.5 - 5.1 MMOL/L    Chloride 103 99 - 110 mMol/L    CO2 27 21 - 32 MMOL/L    Anion Gap 6 4 - 16    BUN 18 6 - 23 MG/DL    CREATININE 0.9 0.6 - 1.1 MG/DL    Glucose 88 70 - 99 MG/DL    Calcium 10.6 8.3 - 10.6 MG/DL    GFR Non-African American >60 >60 mL/min/1.73m2    GFR African American >60 >60 mL/min/1.73m2   Hepatic Function Panel   Result Value Ref Range    Albumin 4.4 3.4 - 5.0 GM/DL    Total Bilirubin 0.2 0.0 - 1.0 MG/DL    Bilirubin, Direct 0.2 0.0 - 0.3 MG/DL    Bilirubin, Indirect 0.0 0 - 0.7 MG/DL    Alkaline Phosphatase 93 40 - 129 IU/L    AST 22 15 - 37 IU/L    ALT 15 10 - 40 U/L    Total Protein 7.3 6.4 - 8.2 GM/DL   Troponin   Result Value Ref Range    Troponin T <0.010 <0.01 NG/ML   EKG 12 Lead   Result Value Ref Range    Ventricular Rate 65 BPM    Atrial Rate 65 BPM    P-R Interval 196 ms    QRS Duration 94 ms    Q-T Interval 404 ms    QTc Calculation (Bazett) 420 ms    P Axis 42 degrees    R Axis -21 degrees    T Axis 20 degrees Diagnosis       Normal sinus rhythm  Incomplete right bundle branch block  Minimal voltage criteria for LVH, may be normal variant  Borderline ECG  When compared with ECG of 29-MAR-2021 14:13,  No significant change was found        Radiographs (if obtained):  Radiologist's Report Reviewed:  No results found. EKG (if obtained): (All EKG's are interpreted by myself in the absence of a cardiologist)  EKG done at 2241  Rate 65  Sinus  Normal intervals  Normal axis  Nonspecific ST and T wave changes    Normal sinus rhythm rate 65 with nonspecific ST and T wave changes    Medications   sodium chloride flush 0.9 % injection 5-40 mL (has no administration in time range)   0.9 % sodium chloride bolus (1,000 mLs Intravenous New Bag 5/8/21 2307)   ondansetron (ZOFRAN) injection 4 mg (4 mg Intravenous Given 5/8/21 2307)   morphine sulfate (PF) injection 4 mg (4 mg Intravenous Given 5/8/21 2307)   promethazine (PHENERGAN) injection 12.5 mg (12.5 mg Intramuscular Given 5/8/21 2356)   morphine sulfate (PF) injection 4 mg (4 mg Intravenous Given 5/8/21 2357)   LORazepam (ATIVAN) injection 1 mg (1 mg Intravenous Given 5/9/21 0014)   HYDROmorphone (DILAUDID) injection 0.5 mg (0.5 mg Intravenous Given 5/9/21 0144)   iopamidol (ISOVUE-370) 76 % injection 75 mL (75 mLs Intravenous Given 5/9/21 0132)         MDM:  Patient here with acute on chronic abdominal pain, nausea and vomiting. Patient reports she has been unable to keep anything down except for Pedialyte since she was discharged from the hospital.  patient admitted 5/1 for acute on chronic abdominal pain intractable nausea and vomiting. I estimate there is LOW risk for an acute emergent intraabdominal process including, but not limited to, acute appendicitis, bowel obstruction, acute cholecystitis, ruptured diverticulitis, incarcerated/strangling hernia, problem with sleeve gastrectomy, perforated bowel/ulcer. Work-up nonacute.     Patient is following with Dr. Cruz Monge and is reportedly scheduled for a Jet-en-Y gastric bypass. Patient reports she does not feel that she can go home due to her intractable nausea, vomiting and acute on chronic abdominal pain. I discussed patient's past medical history, presentation and work-up with hospitalist who was agreeable to admission. Clinical Impression:  1. Intractable nausea and vomiting      Disposition referral (if applicable):  No follow-up provider specified. Disposition medications (if applicable):  New Prescriptions    No medications on file     ED Provider Disposition Time  DISPOSITION        Comment: Please note this report has been produced using speech recognition software and may contain errors related to that system including errors in grammar, punctuation, and spelling, as well as words and phrases that may be inappropriate. Efforts were made to edit the dictations.         Chelsi Meeks MD  05/12/21 1986

## 2021-05-09 NOTE — H&P
History and Physical      Name:  Luiz Kraft /Age/Sex: 1970  (44 y.o. female)   MRN & CSN:  [id number] & [id number] Admission Date/Time: 2021 10:08 PM   Location:  [facility]TR-01 PCP: Gwen Godwin MD       Hospital Day: 2    Assessment and Plan:   57-year-old female with past medical history of chronic abdominal pain and nausea, history of gastric bypass surgery, GERD, chronic pancreatitis, seizures, depression, chronic pain syndrome that is presenting with intractable nausea and vomiting. Nausea and Vomiting  Acute on Chronic Abdominal Pain  Hx of Chronic Pancreatitis  Hx Gastric Sleeve in 2019  - Case d/w ED; just recently discharged and states her n/v has progressed since discharge  - Not taking any PO over last few days besides Pedialyte  - Labs unremarkable  - CT A/P with no acute pathology; no pancreatic changes  - Will hydrate overnight   - Pain and nausea control  - Patient states she follows closely with Dr. Treasure Mace who is has been managing these symptoms closely with plans for RnYGB in near future; Dr. Treasure Mace consulted appreciate recs    Chronic Medical Conditions: Home Meds Resumed  GERD  Chronic Pain Syndrome/Fibromyalgia  COPD  Depression  Seizure Disorder  Iron Deficiency Anemia- states she is receiving iron transfusions as an outpatient    Diet No diet orders on file   DVT Prophylaxis [] Lovenox, []  Heparin, [] SCDs, [] Ambulation   GI Prophylaxis [] PPI,  [] H2 Blocker,  [] Carafate,  [] Diet/Tube Feeds   Code Status Prior   Disposition Patient requires continued admission due to    MDM [] Low, [] Moderate,[]  High  Patient's risk as above due to      History of Present Illness:   57-year-old female with past medical history of chronic abdominal pain and nausea, history of gastric bypass surgery, GERD, chronic pancreatitis, seizures, depression, chronic pain syndrome that is presenting with intractable nausea and vomiting.   Patient was just recently admitted for similar exertion     Sleep apnea     Has CPAP\"no longer use the cpap since lost weight\"    Staph infection Dx 11-15    Left Foot    Staph infection Dx 11-15    \"Left Foot\"    Teeth missing     Upper And Lower    Thyroid disease     Wears glasses     To Read     PSHX:  has a past surgical history that includes Lung removal, partial (Left, );  section (1991); Foot surgery (Left, Last Done In ); Dental surgery; Cholecystectomy, laparoscopic (6962'B); other surgical history (1998); other surgical history (Last Done 7-15-16); Tonsillectomy and adenoidectomy (); Appendectomy (1998); Upper gastrointestinal endoscopy (2018); Lap Band (); Hysterectomy (10/1997); Endoscopy, colon, diagnostic (Last Done In ); Colonoscopy (Last Done In ); Cardiac catheterization (10/24/2010); Sleeve Gastrectomy (N/A, 2019); hiatal hernia repair (N/A, 2019); Upper gastrointestinal endoscopy (N/A, 2019); Foot Debridement (Left, 2019); Upper gastrointestinal endoscopy (N/A, 2019); Catheter Removal (N/A, 2019); Upper gastrointestinal endoscopy (N/A, 2019); INSERTION / REMOVAL / REPLACEMENT VENOUS ACCESS CATHETER (N/A, 8/15/2019); Upper gastrointestinal endoscopy (N/A, 10/14/2019); [address] Surgery (N/A, 1/15/2020); [address] Surgery (N/A, 2020); Upper gastrointestinal endoscopy (N/A, 2020); and Leg biopsy excision (Left, 3/8/2021). Allergies: Allergies   Allergen Reactions    Aspirin Palpitations     \"My Heart Rate Elevates\"    Shellfish-Derived Products Swelling    Toradol [Ketorolac Tromethamine] Rash    Compazine [Prochlorperazine]     Reglan [Metoclopramide] Itching       FAM HX: family history includes Arthritis in her mother; Asthma in her father and son;  Cancer in her father; Diabetes in her father and mother; Heart Disease in her mother; High Blood Pressure in her brother, father, and mother; High Cholesterol in her mother; Lupus in her daughter; Obesity in her mother; Other in her daughter; Vision Loss in her mother and son.   Soc HX:   Social History     Socioeconomic History    Marital status:      Spouse name: None    Number of children: None    Years of education: None    Highest education level: None   Occupational History    None   Social Needs    Financial resource strain: None    Food insecurity     Worry: None     Inability: None    Transportation needs     Medical: None     Non-medical: None   Tobacco Use    Smoking status: Never Smoker    Smokeless tobacco: Never Used   Substance and Sexual Activity    Alcohol use: No     Alcohol/week: 0.0 standard drinks    Drug use: No    Sexual activity: Not Currently   Lifestyle    Physical activity     Days per week: None     Minutes per session: None    Stress: None   Relationships    Social connections     Talks on phone: None     Gets together: None     Attends Quaker service: None     Active member of club or organization: None     Attends meetings of clubs or organizations: None     Relationship status: None    Intimate partner violence     Fear of current or ex partner: None     Emotionally abused: None     Physically abused: None     Forced sexual activity: None   Other Topics Concern    None   Social History Narrative    None       Medications:   Medications:    sodium chloride flush  5-40 mL Intravenous BID      Infusions:   PRN Meds:        Electronically signed by Bao Farris MD on 5/9/2021 at 3:28 AM

## 2021-05-09 NOTE — PLAN OF CARE
Problem: Nausea/Vomiting:  Goal: Absence of nausea/vomiting  Description: Absence of nausea/vomiting  5/9/2021 1038 by Shea Barfield RN  Outcome: Ongoing  5/9/2021 0404 by Km Gatica RN  Outcome: Ongoing  Goal: Able to drink  Description: Able to drink  5/9/2021 1038 by Shea Barfield RN  Outcome: Ongoing  5/9/2021 0404 by Km Gatica RN  Outcome: Ongoing  Goal: Able to eat  Description: Able to eat  5/9/2021 1038 by Shea Barfield RN  Outcome: Ongoing  5/9/2021 0404 by Km Gatica RN  Outcome: Ongoing  Goal: Ability to achieve adequate nutritional intake will improve  Description: Ability to achieve adequate nutritional intake will improve  5/9/2021 1038 by Shea Barfield RN  Outcome: Ongoing  5/9/2021 0404 by Km Gatica RN  Outcome: Ongoing     Problem: Anxiety:  Goal: Level of anxiety will decrease  Description: Level of anxiety will decrease  5/9/2021 1038 by Shea Barfield RN  Outcome: Ongoing  5/9/2021 0404 by Km Gatica RN  Outcome: Ongoing     Problem: Pain:  Goal: Pain level will decrease  Description: Pain level will decrease  Outcome: Ongoing  Goal: Control of acute pain  Description: Control of acute pain  Outcome: Ongoing  Goal: Control of chronic pain  Description: Control of chronic pain  Outcome: Ongoing

## 2021-05-10 ENCOUNTER — TELEPHONE (OUTPATIENT)
Dept: INFUSION THERAPY | Age: 51
End: 2021-05-10

## 2021-05-10 LAB
ANION GAP SERPL CALCULATED.3IONS-SCNC: 12 MMOL/L (ref 4–16)
BASOPHILS ABSOLUTE: 0 K/CU MM
BASOPHILS RELATIVE PERCENT: 0.8 % (ref 0–1)
BUN BLDV-MCNC: 9 MG/DL (ref 6–23)
CALCIUM SERPL-MCNC: 9.3 MG/DL (ref 8.3–10.6)
CHLORIDE BLD-SCNC: 109 MMOL/L (ref 99–110)
CO2: 20 MMOL/L (ref 21–32)
CREAT SERPL-MCNC: 0.7 MG/DL (ref 0.6–1.1)
DIFFERENTIAL TYPE: ABNORMAL
EOSINOPHILS ABSOLUTE: 0.2 K/CU MM
EOSINOPHILS RELATIVE PERCENT: 4.8 % (ref 0–3)
GFR AFRICAN AMERICAN: >60 ML/MIN/1.73M2
GFR NON-AFRICAN AMERICAN: >60 ML/MIN/1.73M2
GLUCOSE BLD-MCNC: 89 MG/DL (ref 70–99)
HCT VFR BLD CALC: 33.9 % (ref 37–47)
HEMOGLOBIN: 10.4 GM/DL (ref 12.5–16)
IMMATURE NEUTROPHIL %: 0.3 % (ref 0–0.43)
LYMPHOCYTES ABSOLUTE: 0.7 K/CU MM
LYMPHOCYTES RELATIVE PERCENT: 20.1 % (ref 24–44)
MCH RBC QN AUTO: 25.6 PG (ref 27–31)
MCHC RBC AUTO-ENTMCNC: 30.7 % (ref 32–36)
MCV RBC AUTO: 83.5 FL (ref 78–100)
MONOCYTES ABSOLUTE: 0.2 K/CU MM
MONOCYTES RELATIVE PERCENT: 5.7 % (ref 0–4)
NUCLEATED RBC %: 0 %
PDW BLD-RTO: 13.9 % (ref 11.7–14.9)
PLATELET # BLD: 163 K/CU MM (ref 140–440)
PMV BLD AUTO: 11.1 FL (ref 7.5–11.1)
POTASSIUM SERPL-SCNC: 4.3 MMOL/L (ref 3.5–5.1)
RBC # BLD: 4.06 M/CU MM (ref 4.2–5.4)
SEGMENTED NEUTROPHILS ABSOLUTE COUNT: 2.4 K/CU MM
SEGMENTED NEUTROPHILS RELATIVE PERCENT: 68.3 % (ref 36–66)
SODIUM BLD-SCNC: 141 MMOL/L (ref 135–145)
TOTAL IMMATURE NEUTOROPHIL: 0.01 K/CU MM
TOTAL NUCLEATED RBC: 0 K/CU MM
WBC # BLD: 3.5 K/CU MM (ref 4–10.5)

## 2021-05-10 PROCEDURE — 6360000002 HC RX W HCPCS: Performed by: INTERNAL MEDICINE

## 2021-05-10 PROCEDURE — 1200000000 HC SEMI PRIVATE

## 2021-05-10 PROCEDURE — 80048 BASIC METABOLIC PNL TOTAL CA: CPT

## 2021-05-10 PROCEDURE — 36415 COLL VENOUS BLD VENIPUNCTURE: CPT

## 2021-05-10 PROCEDURE — 2580000003 HC RX 258: Performed by: INTERNAL MEDICINE

## 2021-05-10 PROCEDURE — 6360000002 HC RX W HCPCS: Performed by: NURSE PRACTITIONER

## 2021-05-10 PROCEDURE — 6370000000 HC RX 637 (ALT 250 FOR IP): Performed by: INTERNAL MEDICINE

## 2021-05-10 PROCEDURE — 99221 1ST HOSP IP/OBS SF/LOW 40: CPT | Performed by: SURGERY

## 2021-05-10 PROCEDURE — 85025 COMPLETE CBC W/AUTO DIFF WBC: CPT

## 2021-05-10 PROCEDURE — 94761 N-INVAS EAR/PLS OXIMETRY MLT: CPT

## 2021-05-10 PROCEDURE — C9113 INJ PANTOPRAZOLE SODIUM, VIA: HCPCS | Performed by: INTERNAL MEDICINE

## 2021-05-10 RX ORDER — METHYLPREDNISOLONE SODIUM SUCCINATE 125 MG/2ML
60 INJECTION, POWDER, LYOPHILIZED, FOR SOLUTION INTRAMUSCULAR; INTRAVENOUS ONCE
Status: COMPLETED | OUTPATIENT
Start: 2021-05-10 | End: 2021-05-10

## 2021-05-10 RX ADMIN — LEVETIRACETAM 1000 MG: 100 INJECTION, SOLUTION INTRAVENOUS at 21:53

## 2021-05-10 RX ADMIN — PROMETHAZINE HYDROCHLORIDE 6.25 MG: 25 INJECTION INTRAMUSCULAR; INTRAVENOUS at 15:29

## 2021-05-10 RX ADMIN — OXYCODONE HYDROCHLORIDE AND ACETAMINOPHEN 1 TABLET: 5; 325 TABLET ORAL at 16:47

## 2021-05-10 RX ADMIN — ONDANSETRON 4 MG: 2 INJECTION INTRAMUSCULAR; INTRAVENOUS at 00:56

## 2021-05-10 RX ADMIN — HYDROMORPHONE HYDROCHLORIDE 0.5 MG: 1 INJECTION, SOLUTION INTRAMUSCULAR; INTRAVENOUS; SUBCUTANEOUS at 06:04

## 2021-05-10 RX ADMIN — OXYCODONE HYDROCHLORIDE AND ACETAMINOPHEN 1 TABLET: 5; 325 TABLET ORAL at 10:31

## 2021-05-10 RX ADMIN — SODIUM CHLORIDE, PRESERVATIVE FREE 10 ML: 5 INJECTION INTRAVENOUS at 21:57

## 2021-05-10 RX ADMIN — CLONIDINE HYDROCHLORIDE 0.1 MG: 0.1 TABLET ORAL at 10:31

## 2021-05-10 RX ADMIN — HYDROXYZINE HYDROCHLORIDE 50 MG: 50 INJECTION, SOLUTION INTRAMUSCULAR at 00:49

## 2021-05-10 RX ADMIN — HYDROXYZINE PAMOATE 50 MG: 25 CAPSULE ORAL at 15:29

## 2021-05-10 RX ADMIN — HYDROMORPHONE HYDROCHLORIDE 0.5 MG: 1 INJECTION, SOLUTION INTRAMUSCULAR; INTRAVENOUS; SUBCUTANEOUS at 00:53

## 2021-05-10 RX ADMIN — SODIUM CHLORIDE, PRESERVATIVE FREE 10 ML: 5 INJECTION INTRAVENOUS at 11:50

## 2021-05-10 RX ADMIN — ONDANSETRON 4 MG: 2 INJECTION INTRAMUSCULAR; INTRAVENOUS at 12:10

## 2021-05-10 RX ADMIN — ENOXAPARIN SODIUM 40 MG: 40 INJECTION SUBCUTANEOUS at 10:23

## 2021-05-10 RX ADMIN — HYDROXYZINE PAMOATE 50 MG: 25 CAPSULE ORAL at 21:53

## 2021-05-10 RX ADMIN — HYDROMORPHONE HYDROCHLORIDE 0.5 MG: 1 INJECTION, SOLUTION INTRAMUSCULAR; INTRAVENOUS; SUBCUTANEOUS at 12:14

## 2021-05-10 RX ADMIN — ESTRADIOL 0.5 MG: 1 TABLET ORAL at 10:31

## 2021-05-10 RX ADMIN — ONDANSETRON 4 MG: 2 INJECTION INTRAMUSCULAR; INTRAVENOUS at 06:04

## 2021-05-10 RX ADMIN — PROMETHAZINE HYDROCHLORIDE 6.25 MG: 25 INJECTION INTRAMUSCULAR; INTRAVENOUS at 22:49

## 2021-05-10 RX ADMIN — METHYLPREDNISOLONE SODIUM SUCCINATE 60 MG: 125 INJECTION, POWDER, FOR SOLUTION INTRAMUSCULAR; INTRAVENOUS at 10:23

## 2021-05-10 RX ADMIN — PANTOPRAZOLE SODIUM 40 MG: 40 INJECTION, POWDER, FOR SOLUTION INTRAVENOUS at 10:23

## 2021-05-10 RX ADMIN — HYDROMORPHONE HYDROCHLORIDE 0.5 MG: 1 INJECTION, SOLUTION INTRAMUSCULAR; INTRAVENOUS; SUBCUTANEOUS at 22:49

## 2021-05-10 RX ADMIN — LEVETIRACETAM 1000 MG: 100 INJECTION, SOLUTION INTRAVENOUS at 10:24

## 2021-05-10 RX ADMIN — ONDANSETRON 4 MG: 2 INJECTION INTRAMUSCULAR; INTRAVENOUS at 18:42

## 2021-05-10 RX ADMIN — HYDROMORPHONE HYDROCHLORIDE 0.5 MG: 1 INJECTION, SOLUTION INTRAMUSCULAR; INTRAVENOUS; SUBCUTANEOUS at 18:42

## 2021-05-10 RX ADMIN — PROMETHAZINE HYDROCHLORIDE 6.25 MG: 25 INJECTION INTRAMUSCULAR; INTRAVENOUS at 02:57

## 2021-05-10 ASSESSMENT — PAIN SCALES - GENERAL
PAINLEVEL_OUTOF10: 2
PAINLEVEL_OUTOF10: 7
PAINLEVEL_OUTOF10: 6
PAINLEVEL_OUTOF10: 10
PAINLEVEL_OUTOF10: 9
PAINLEVEL_OUTOF10: 2

## 2021-05-10 ASSESSMENT — PAIN - FUNCTIONAL ASSESSMENT: PAIN_FUNCTIONAL_ASSESSMENT: ACTIVITIES ARE NOT PREVENTED

## 2021-05-10 ASSESSMENT — PAIN DESCRIPTION - DESCRIPTORS: DESCRIPTORS: ACHING;CRAMPING

## 2021-05-10 ASSESSMENT — PAIN DESCRIPTION - PROGRESSION
CLINICAL_PROGRESSION: NOT CHANGED

## 2021-05-10 ASSESSMENT — PAIN DESCRIPTION - ONSET: ONSET: ON-GOING

## 2021-05-10 ASSESSMENT — PAIN DESCRIPTION - FREQUENCY: FREQUENCY: CONTINUOUS

## 2021-05-10 ASSESSMENT — PAIN DESCRIPTION - ORIENTATION: ORIENTATION: RIGHT;MID

## 2021-05-10 NOTE — CARE COORDINATION
Reviewed chart and spoke with pt about discharge needs/plans. Pt lives with her mother, PTA she was independent with ADL's and mother assists with ADL's and transportation as needed. Pt has a PCP and insurance that covers medications. Discussed HC but does not feel needed at this time. Plan is home with mother. CM available if needs arise.

## 2021-05-10 NOTE — TELEPHONE ENCOUNTER
SAMMIEM for pt to return my call to schedule her iron tx's as they are approved by her insurance company. Per pt's chart she is currently in pt at [facility].

## 2021-05-10 NOTE — CONSULTS
1990's    COLONOSCOPY  Last Done In 2000's   [facility] DENTAL SURGERY      Teeth Extracted In Past    ENDOSCOPY, COLON, DIAGNOSTIC  Last Done In 2018    FOOT DEBRIDEMENT Left 6/16/2019    FIRST METATARSAL DEBRIDEMENT INCISION AND DRAINAGE. EXCISION OF ULCER.  RESECTION OF BONE. 1ST METATARSAL POWER LAVAGE AND BONE CEMENT performed by Peggy Desir DPM at [address] [place] Left Last Done In 2016     Dr. Leelee Jiang, \" About 6 Surgeries Done Because Of Staph Infection\"    HIATAL HERNIA REPAIR N/A 2/12/2019    HERNIA HIATAL LAPAROSCOPIC ROBOTIC performed by Berna Mei MD at [address] [place] [address]  10/1997    Partial Abdominal Hysterectomy    INSERTION / REMOVAL / REPLACEMENT VENOUS ACCESS CATHETER N/A 8/15/2019    PORT INSERTION performed by Berna Mei MD at [facility]    removed after 6 months    LEG BIOPSY EXCISION Left 3/8/2021    LEFT THIGH LESION BIOPSY EXCISION performed by Berna Mei MD at [facility], PARTIAL Left 2008    Benign    OTHER SURGICAL HISTORY  02/1998    \"Tubes And Ovaries Removed, Appendectomy Also Done\"    OTHER SURGICAL HISTORY  Last Done 7-15-16    Mediport Insertion \"Total Of Six Done, Removed Last Mediport In 2014\"    PORT SURGERY N/A 1/15/2020    PORT REMOVAL performed by Berna Mei MD at [address] [place] N/A 7/6/2020    PORT INSERTION performed by Berna Mei MD at [facility] N/A 2/12/2019    GASTRECTOMY SLEEVE LAPAROSCOPIC ROBOTIC performed by Berna Mei MD at [address] [place] [address]  08/27/2018    UPPER GASTROINTESTINAL ENDOSCOPY N/A 4/2/2019    EGD CONTROL HEMORRHAGE with epi injection at bleeding site performed by Berna Mei MD at [address] 6/19/2019    EGD DIAGNOSTIC ONLY performed by Berna Mei MD at [address] [facility] 7/22/2019    EGD DIAGNOSTIC ONLY performed by Hilda Robles MD at [address] N/A 10/14/2019    EGD DIAGNOSTIC ONLY performed by Rayna Mcdonough MD at [address] N/A 7/17/2020    EGJ DILATATION BALLOON WITH 18-20 MM BALLOON, DILATED TO 20 MM. performed by Rayna Mcdonough MD at [facility] ENDOSCOPY       Current Medications:   Current Facility-Administered Medications   Medication Dose Route Frequency Provider Last Rate Last Admin    levETIRAcetam (KEPPRA) 1,000 mg in sodium chloride 0.9 % 100 mL IVPB  1,000 mg Intravenous Q12H Sam Mar MD   Stopped at 05/10/21 1148    albuterol (PROVENTIL) nebulizer solution 2.5 mg  2.5 mg Nebulization Q6H PRN Arminda Diaz MD        atenolol (TENORMIN) tablet 25 mg  25 mg Oral Daily Arminda Diaz MD        calcium-cholecalciferol 500-200 MG-UNIT per tablet 1 tablet  1 tablet Oral BID Arminda Diaz MD   Stopped at 05/09/21 2037    vitamin D CAPS 5,000 Units  5,000 Units Oral Daily Arminda Diaz MD        cloNIDine (CATAPRES) tablet 0.1 mg  0.1 mg Oral BID Arminda Diaz MD   0.1 mg at 05/10/21 1031    estradiol (ESTRACE) tablet 0.5 mg  0.5 mg Oral Daily Arminda Diaz MD   0.5 mg at 05/10/21 1031    ferrous sulfate (IRON 325) tablet 325 mg  325 mg Oral Daily with breakfast Arminda Diaz MD        gabapentin (NEURONTIN) capsule 800 mg  800 mg Oral TID Arminda Diaz MD   Stopped at 05/09/21 2038    hydrOXYzine (VISTARIL) capsule 50 mg  50 mg Oral Q6H PRN Arminda Diaz MD        [Held by provider] levETIRAcetam (KEPPRA) tablet 1,000 mg  1,000 mg Oral BID Arminda Diaz MD   Stopped at 05/09/21 2042    levothyroxine (SYNTHROID) tablet 125 mcg  125 mcg Oral Nightly Arminda Diaz MD   Stopped at 05/09/21 2038    multivitamin 1 tablet  1 tablet Oral Daily Arminda Diaz MD        oxyCODONE-acetaminophen (PERCOCET) 5-325 MG per tablet 1 tablet  1 tablet Oral Q4H PRN Vallarie De Anda MD [place]   1 tablet at 05/10/21 1031    PARoxetine (PAXIL) tablet 50 mg  50 mg Oral Nightly Thom Ryan MD   Stopped at 05/09/21 2039    sodium chloride flush 0.9 % injection 5-40 mL  5-40 mL Intravenous 2 times per day Thom Ryan MD   10 mL at 05/10/21 1150    sodium chloride flush 0.9 % injection 5-40 mL  5-40 mL Intravenous PRN Thom Ryan MD        0.9 % sodium chloride infusion  25 mL Intravenous PRN Thom Ryan MD        enoxaparin (LOVENOX) injection 40 mg  40 mg Subcutaneous Daily Thom Ryan MD   40 mg at 05/10/21 1023    promethazine (PHENERGAN) tablet 12.5 mg  12.5 mg Oral Q6H PRN Thom Ryan MD        Or    ondansetron [facility]) injection 4 mg  4 mg Intravenous Q6H PRN Thom Ryan MD   4 mg at 05/10/21 1210    polyethylene glycol (GLYCOLAX) packet 17 g  17 g Oral Daily PRN Thom Ryan MD        acetaminophen (TYLENOL) tablet 650 mg  650 mg Oral Q6H PRN Thom Ryan MD        Or    acetaminophen (TYLENOL) suppository 650 mg  650 mg Rectal Q6H PRN Thom Ryan MD        HYDROmorphone (DILAUDID) injection 0.5 mg  0.5 mg Intravenous Q4H PRN Thom Ryan MD   0.5 mg at 05/10/21 1214    pantoprazole (PROTONIX) injection 40 mg  40 mg Intravenous Daily Thom Ryan MD   40 mg at 05/10/21 1023    albuterol sulfate  (90 Base) MCG/ACT inhaler 2 puff  2 puff Inhalation Q4H PRN Enriqueta Moralez MD        promethazine (PHENERGAN) injection 6.25 mg  6.25 mg Intramuscular Q6H PRN Enriqueta Moralez MD   6.25 mg at 05/10/21 0257       Allergies:  Aspirin, Shellfish-derived products, Toradol [ketorolac tromethamine], Compazine [prochlorperazine], and Reglan [metoclopramide]    Social History:   Social History     Socioeconomic History    Marital status:      Spouse name: None    Number of children: None    Years of education: None    Highest education level: None   Occupational History    None   Social Needs    Financial resource dizziness, syncope and numbness. Hx of possible seizures  Hematological: Does not bruise/bleed easily. PHYSICAL EXAM:  Vitals:    05/09/21 1604 05/09/21 2049 05/10/21 0239 05/10/21 0815   BP: (!) 150/85 (!) 135/90 106/63 (!) 146/66   Pulse: 66 70 53 67   Resp: 14 15 15 16   Temp: 98.6 °F (37 °C) 98.6 °F (37 °C)  97.3 °F (36.3 °C)   TempSrc: Axillary Axillary Oral Oral   SpO2: 94% 98% 96% 93%   Weight:   282 lb 4.8 oz (128.1 kg)    Height:           Physical Exam  General: awake, alert, in no acute distress  HEENT: mucous membranes moist  Respiratory: normal effort, no wheezes appreciated  CV: appears well perfused, regular rate and rhythm  Abdomen: Soft, mild epigastric tenderness in the epigastrium, non-distended. No guarding or rebound tenderness. Skin: warm and dry  Extremities: some contracture and swelling in bilateral hands  Neuro: no focal deficits noted  Psych: mood normal        DATA:    Lab Results   Component Value Date    WBC 3.5 (L) 05/10/2021    HGB 10.4 (L) 05/10/2021    HCT 33.9 (L) 05/10/2021    MCV 83.5 05/10/2021     05/10/2021     Lab Results   Component Value Date     05/09/2021    K 3.9 05/09/2021     05/09/2021    CO2 27 05/09/2021    BUN 18 05/09/2021    CREATININE 0.9 05/09/2021    GLUCOSE 88 05/09/2021    CALCIUM 10.6 05/09/2021      CT-   Impression   1. No acute intra-abdominal abnormality. 2. Cholecystectomy, hysterectomy, and sleeve gastrectomy. 3. 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 IMPRESSION:    44 y.o. female with recurrent symptoms of intractable nausea and vomiting. Workup negative for evidence of dehydration and CT without worrisome findings. She likely has reflux worsened by her gastric sleeve. Plans for conversion to [employer] bypass by Dr. Farhan Davidson in the future--hopefully this will help.     Patient Active Problem List:     Fibromyalgia     Lupus (systemic lupus erythematosus) (HCC)     Chronic pancreatitis (HCC)     HTN (hypertension)     Generalized abdominal pain     Frequent UTI     Gastroesophageal reflux disease without esophagitis     Chronic depression     Fatty liver disease, nonalcoholic     Arthritis     Bilateral low back pain with left-sided sciatica     Intractable vomiting with nausea     Hiatal hernia     Status post laparoscopic sleeve gastrectomy     Pseudoseizure     Chronic pain syndrome     Drug-seeking/Aberrant behavior     Lymph node disorder     Morbid obesity with BMI of 40.0-44.9, adult ([address]     Iron deficiency anemia secondary to blood loss (chronic)     Encounter for weight management     Intractable nausea and vomiting     Seizure (HCC)     Abdominal pain     Anxiety     RUQ pain     Intractable vomiting     Status post bariatric surgery     Morbid obesity with BMI of 45.0-49.9, adult (HCC)     Discoloration of skin of flank resembling ecchymosis     Morbid obesity with BMI of 50.0-59.9, adult (HCC)     Chest pain of uncertain etiology     Cellulitis of left thigh     Malabsorption     COPD (chronic obstructive pulmonary disease) (HCC)     Nausea and vomiting        PLAN:  - No need for surgical intervention currently  - recommend continued supportive cares  - will continue to follow, Dr. Amy Zaman plans to be back tomorrow--I will update him on Andre's condition as he is familiar with her care.         Electronically signed by Caleb Ross MD on 5/10/2021 at 2:07 PM

## 2021-05-10 NOTE — PLAN OF CARE
Problem: Nausea/Vomiting:  Goal: Absence of nausea/vomiting  Description: Absence of nausea/vomiting  5/9/2021 2136 by Ulises Munoz RN  Outcome: Ongoing  5/9/2021 1038 by Yunior Winn RN  Outcome: Ongoing  Goal: Able to drink  Description: Able to drink  5/9/2021 2136 by Ulises Munoz RN  Outcome: Ongoing  5/9/2021 1038 by Yunior Winn RN  Outcome: Ongoing  Goal: Able to eat  Description: Able to eat  5/9/2021 2136 by Ulises Munoz RN  Outcome: Ongoing  5/9/2021 1038 by Yunior Winn RN  Outcome: Ongoing  Goal: Ability to achieve adequate nutritional intake will improve  Description: Ability to achieve adequate nutritional intake will improve  5/9/2021 2136 by Ulises Munoz RN  Outcome: Ongoing  5/9/2021 1038 by Yunior Winn RN  Outcome: Ongoing

## 2021-05-11 VITALS
DIASTOLIC BLOOD PRESSURE: 70 MMHG | RESPIRATION RATE: 14 BRPM | SYSTOLIC BLOOD PRESSURE: 130 MMHG | HEIGHT: 64 IN | BODY MASS INDEX: 48.21 KG/M2 | TEMPERATURE: 97.9 F | HEART RATE: 76 BPM | WEIGHT: 282.4 LBS | OXYGEN SATURATION: 98 %

## 2021-05-11 LAB
ALBUMIN SERPL-MCNC: 3.8 GM/DL (ref 3.4–5)
ALP BLD-CCNC: 82 IU/L (ref 40–128)
ALT SERPL-CCNC: 13 U/L (ref 10–40)
ANION GAP SERPL CALCULATED.3IONS-SCNC: 6 MMOL/L (ref 4–16)
AST SERPL-CCNC: 18 IU/L (ref 15–37)
BILIRUB SERPL-MCNC: 0.2 MG/DL (ref 0–1)
BUN BLDV-MCNC: 14 MG/DL (ref 6–23)
CALCIUM SERPL-MCNC: 9.4 MG/DL (ref 8.3–10.6)
CHLORIDE BLD-SCNC: 105 MMOL/L (ref 99–110)
CO2: 26 MMOL/L (ref 21–32)
CREAT SERPL-MCNC: 0.7 MG/DL (ref 0.6–1.1)
GFR AFRICAN AMERICAN: >60 ML/MIN/1.73M2
GFR NON-AFRICAN AMERICAN: >60 ML/MIN/1.73M2
GLUCOSE BLD-MCNC: 122 MG/DL (ref 70–99)
POTASSIUM SERPL-SCNC: 4.4 MMOL/L (ref 3.5–5.1)
SODIUM BLD-SCNC: 137 MMOL/L (ref 135–145)
TOTAL PROTEIN: 6 GM/DL (ref 6.4–8.2)

## 2021-05-11 PROCEDURE — 6370000000 HC RX 637 (ALT 250 FOR IP): Performed by: INTERNAL MEDICINE

## 2021-05-11 PROCEDURE — 6360000002 HC RX W HCPCS: Performed by: INTERNAL MEDICINE

## 2021-05-11 PROCEDURE — 2580000003 HC RX 258: Performed by: INTERNAL MEDICINE

## 2021-05-11 PROCEDURE — C9113 INJ PANTOPRAZOLE SODIUM, VIA: HCPCS | Performed by: INTERNAL MEDICINE

## 2021-05-11 PROCEDURE — 80053 COMPREHEN METABOLIC PANEL: CPT

## 2021-05-11 PROCEDURE — 36415 COLL VENOUS BLD VENIPUNCTURE: CPT

## 2021-05-11 RX ORDER — PANTOPRAZOLE SODIUM 40 MG/1
40 TABLET, DELAYED RELEASE ORAL
Qty: 30 TABLET | Refills: 3 | Status: SHIPPED | OUTPATIENT
Start: 2021-05-11 | End: 2021-07-16 | Stop reason: SDUPTHER

## 2021-05-11 RX ORDER — HEPARIN SODIUM (PORCINE) LOCK FLUSH IV SOLN 100 UNIT/ML 100 UNIT/ML
500 SOLUTION INTRAVENOUS PRN
Status: DISCONTINUED | OUTPATIENT
Start: 2021-05-11 | End: 2021-05-11 | Stop reason: HOSPADM

## 2021-05-11 RX ADMIN — OXYCODONE HYDROCHLORIDE AND ACETAMINOPHEN 1 TABLET: 5; 325 TABLET ORAL at 02:37

## 2021-05-11 RX ADMIN — SODIUM CHLORIDE, PRESERVATIVE FREE 10 ML: 5 INJECTION INTRAVENOUS at 10:02

## 2021-05-11 RX ADMIN — ESTRADIOL 0.5 MG: 1 TABLET ORAL at 10:00

## 2021-05-11 RX ADMIN — HYDROMORPHONE HYDROCHLORIDE 0.5 MG: 1 INJECTION, SOLUTION INTRAMUSCULAR; INTRAVENOUS; SUBCUTANEOUS at 05:20

## 2021-05-11 RX ADMIN — GABAPENTIN 800 MG: 400 CAPSULE ORAL at 10:01

## 2021-05-11 RX ADMIN — CLONIDINE HYDROCHLORIDE 0.1 MG: 0.1 TABLET ORAL at 10:01

## 2021-05-11 RX ADMIN — THERA TABS 1 TABLET: TAB at 10:00

## 2021-05-11 RX ADMIN — Medication 1 TABLET: at 10:01

## 2021-05-11 RX ADMIN — LEVETIRACETAM 1000 MG: 100 INJECTION, SOLUTION INTRAVENOUS at 10:02

## 2021-05-11 RX ADMIN — ENOXAPARIN SODIUM 40 MG: 40 INJECTION SUBCUTANEOUS at 10:01

## 2021-05-11 RX ADMIN — ONDANSETRON 4 MG: 2 INJECTION INTRAMUSCULAR; INTRAVENOUS at 02:38

## 2021-05-11 RX ADMIN — OXYCODONE HYDROCHLORIDE AND ACETAMINOPHEN 1 TABLET: 5; 325 TABLET ORAL at 10:01

## 2021-05-11 RX ADMIN — Medication 5000 UNITS: at 10:00

## 2021-05-11 RX ADMIN — ATENOLOL 25 MG: 25 TABLET ORAL at 10:12

## 2021-05-11 RX ADMIN — PANTOPRAZOLE SODIUM 40 MG: 40 INJECTION, POWDER, FOR SOLUTION INTRAVENOUS at 10:01

## 2021-05-11 RX ADMIN — FERROUS SULFATE TAB 325 MG (65 MG ELEMENTAL FE) 325 MG: 325 (65 FE) TAB at 10:09

## 2021-05-11 ASSESSMENT — PAIN DESCRIPTION - LOCATION: LOCATION: ABDOMEN

## 2021-05-11 ASSESSMENT — PAIN SCALES - GENERAL: PAINLEVEL_OUTOF10: 5

## 2021-05-11 NOTE — PLAN OF CARE
Problem: Nausea/Vomiting:  Goal: Absence of nausea/vomiting  Description: Absence of nausea/vomiting  Outcome: Ongoing  Goal: Able to drink  Description: Able to drink  Outcome: Ongoing  Goal: Able to eat  Description: Able to eat  Outcome: Ongoing  Goal: Ability to achieve adequate nutritional intake will improve  Description: Ability to achieve adequate nutritional intake will improve  Outcome: Ongoing     Problem: Anxiety:  Goal: Level of anxiety will decrease  Description: Level of anxiety will decrease  Outcome: Ongoing     Problem: Pain:  Goal: Pain level will decrease  Description: Pain level will decrease  Outcome: Ongoing  Goal: Control of acute pain  Description: Control of acute pain  Outcome: Ongoing  Goal: Control of chronic pain  Description: Control of chronic pain  Outcome: Ongoing

## 2021-05-11 NOTE — PROGRESS NOTES
Patient continued to vomit. NP [facility] notified and patient made NPO. This nurse explained to the patient why she is NPO.
Patient decided that she wanted to go home due to having a PCP appointment in the morning. Dr. Cindy Hendrickson notified and okay to proceed with discharge. Discharge instructions reviewed with patient, all questions answered. Port de-accessed per protocol. Patient awaiting arrival of ride.
Patient has not vomited since I took over care for her today.
Patient vomited while in shower. Patient requested to be discharged this morning. Now stating she's \"not sure she should be discharged\" and wants to talk to Dr. Alex Chavez first. Dr. Alex Chavez notified of above, awaiting response.
Pt asking for pain medication. Dilaudid is what pt wants but 1. Was not time yet and 2. Should be given oral norco first per order. Pt was offered the norco but states she \"only want the IV medicine. \"
edema.  GI Abdomen is soft without significant tenderness, masses, or guarding. Bowel sounds are normoactive. Rectal exam deferred.  No costovertebral angle tenderness. Ryder catheter is not present. HEME/LYMPH No palpable cervical lymphadenopathy and no hepatosplenomegaly. No petechiae or ecchymoses. MSK No gross joint deformities. SKIN Normal coloration, warm, dry. NEURO Cranial nerves appear grossly intact, normal speech, no lateralizing weakness. PSYCH Awake, alert, oriented x 4. Affect appropriate.     Medications:   Medications:    methylPREDNISolone  60 mg Intravenous Once    levetiracetam  1,000 mg Intravenous Q12H    atenolol  25 mg Oral Daily    calcium-cholecalciferol  1 tablet Oral BID    vitamin D  5,000 Units Oral Daily    cloNIDine  0.1 mg Oral BID    estradiol  0.5 mg Oral Daily    ferrous sulfate  325 mg Oral Daily with breakfast    gabapentin  800 mg Oral TID    [Held by provider] levETIRAcetam  1,000 mg Oral BID    levothyroxine  125 mcg Oral Nightly    multivitamin  1 tablet Oral Daily    PARoxetine  50 mg Oral Nightly    sodium chloride flush  5-40 mL Intravenous 2 times per day    enoxaparin  40 mg Subcutaneous Daily    pantoprazole  40 mg Intravenous Daily      Infusions:    sodium chloride       PRN Meds: albuterol, 2.5 mg, Q6H PRN  hydrOXYzine, 50 mg, Q6H PRN  oxyCODONE-acetaminophen, 1 tablet, Q4H PRN  sodium chloride flush, 5-40 mL, PRN  sodium chloride, 25 mL, PRN  promethazine, 12.5 mg, Q6H PRN    Or  ondansetron, 4 mg, Q6H PRN  polyethylene glycol, 17 g, Daily PRN  acetaminophen, 650 mg, Q6H PRN    Or  acetaminophen, 650 mg, Q6H PRN  HYDROmorphone, 0.5 mg, Q4H PRN  albuterol sulfate HFA, 2 puff, Q4H PRN  promethazine, 6.25 mg, Q6H PRN          Electronically signed by Fatmata Berger MD on 5/10/2021 at 9:31 AM
without significant tenderness, masses, or guarding. Bowel sounds are normoactive. Rectal exam deferred.  No costovertebral angle tenderness. Ryder catheter is not present. HEME/LYMPH No palpable cervical lymphadenopathy and no hepatosplenomegaly. No petechiae or ecchymoses. MSK No gross joint deformities. SKIN Normal coloration, warm, dry. NEURO Cranial nerves appear grossly intact, normal speech, no lateralizing weakness. PSYCH Awake, alert, oriented x 4. Affect appropriate.     Medications:   Medications:    albuterol sulfate HFA  2 puff Inhalation 4x Daily    atenolol  25 mg Oral Daily    calcium-cholecalciferol  1 tablet Oral BID    vitamin D  5,000 Units Oral Daily    cloNIDine  0.1 mg Oral BID    estradiol  0.5 mg Oral Daily    ferrous sulfate  325 mg Oral Daily with breakfast    gabapentin  800 mg Oral TID    levETIRAcetam  1,000 mg Oral BID    levothyroxine  125 mcg Oral Nightly    multivitamin  1 tablet Oral Daily    PARoxetine  50 mg Oral Nightly    sodium chloride flush  5-40 mL Intravenous 2 times per day    enoxaparin  40 mg Subcutaneous Daily    pantoprazole  40 mg Intravenous Daily    iron sucrose  500 mg Intravenous Once      Infusions:    sodium chloride      dextrose 5% and 0.45% NaCl with KCl 20 mEq 100 mL/hr at 05/09/21 0407     PRN Meds: albuterol, 2.5 mg, Q6H PRN  hydrOXYzine, 50 mg, Q6H PRN  oxyCODONE-acetaminophen, 1 tablet, Q4H PRN  sodium chloride flush, 5-40 mL, PRN  sodium chloride, 25 mL, PRN  promethazine, 12.5 mg, Q6H PRN    Or  ondansetron, 4 mg, Q6H PRN  polyethylene glycol, 17 g, Daily PRN  acetaminophen, 650 mg, Q6H PRN    Or  acetaminophen, 650 mg, Q6H PRN  HYDROmorphone, 0.5 mg, Q4H PRN          Electronically signed by Shala Bernard MD on 5/9/2021 at 10:26 AM

## 2021-05-11 NOTE — DISCHARGE SUMMARY
Yael Suarez Toshia 1970 [id number]  PCP:  Ha Patricio MD    Admit date: 5/8/2021  Admitting Physician: Inna Bosch MD    Discharge date: 5/11/2021 Discharge Physician: Deepak Wright MD         Discharge Diagnoses. As per below    Hospital Course:  History of present illness at admission: As per H&P  Subsequent Hospital Course:     -Acute gastritis improved with hydration. Electrolytes replaced. General surgery evaluated. Recommend outpatient follow-up. Patient is tolerating diet slowly. Wants to be discharged. -Morbid obesity follow-up with surgery outpatient.  -Seizure disorder continue Keppra. Stable. -GERD continue Protonix. Outpatient follow-up with GI as needed. On the day of discharge, pt felt better. No new complaints.     Pertinent Exam Findings on Day of Discharge:  General Appearance:    Alert, cooperative, no distress, appears stated age  Head:    Normocephalic, without obvious abnormality, atraumatic  Eyes:    PERRL, conjunctiva/corneas clear, EOM's intact  Lungs:    Clear to auscultation bilaterally, respirations unlabored   Heart:    Regular rate and rhythm, S1 and S2 normal, no murmur,   rub or gallop  Abdomen:     Soft, non-tender, bowel sounds active, no masses, no organomegaly  Extremities:   Extremities normal, atraumatic, no cyanosis or edema    Consults:  IP CONSULT TO HOSPITALIST  IP CONSULT TO GENERAL SURGERY    Patient Instructions:   Andre Pope   Home Medication Instructions MARINE:[id number]    Printed on:05/11/21 1036   Medication Information                      albuterol (PROVENTIL) (2.5 MG/3ML) 0.083% nebulizer solution  Take 3 mLs by nebulization every 6 hours as needed for Wheezing             albuterol sulfate  (90 Base) MCG/ACT inhaler  Inhale 2 puffs into the lungs 4 times daily             atenolol (TENORMIN) 25 MG tablet  Take 25 mg by mouth daily             betamethasone valerate (VALISONE) 0.1 % ointment  APPLY OINTMENT TO AFFECTED AREA TWICE DAILY TO HANDS AND ELBOWS FOR 21 DAYS             Calcium Citrate-Vitamin D (CALCIUM + VIT D, BARIATRIC ADVANTAGE, CHEWABLE TABLET)  Take 1 tablet by mouth 2 times daily             Cholecalciferol (VITAMIN D3) 5000 units TABS  Take 1 tablet by mouth daily             cloNIDine (CATAPRES) 0.1 MG tablet  TAKE 1 TABLET BY MOUTH TWICE DAILY             dicyclomine (BENTYL) 10 MG capsule  Take 1 capsule by mouth 3 times daily as needed (abdominal pain)             EQ PINK-BISMUTH 262 MG chewable tablet  CHEW & SWALLOW 2 TABLETS BY MOUTH 4 TIMES DAILY BEFORE MEAL(S) AND NIGHTLY FOR 5 DAYS             estradiol (ESTRACE) 0.5 MG tablet  Take 0.5 mg by mouth daily             ferrous sulfate (IRON 325) 325 (65 Fe) MG tablet  Take 1 tablet by mouth daily (with breakfast)             gabapentin (NEURONTIN) 800 MG tablet  Take 800 mg by mouth 3 times daily             hydrOXYzine (VISTARIL) 50 MG capsule  Take 1 capsule by mouth every 6 hours as needed for Anxiety             levETIRAcetam (KEPPRA) 1000 MG tablet  Take 1 tablet by mouth 2 times daily             levothyroxine (SYNTHROID) 125 MCG tablet  Take 1 tablet by mouth Daily             Multiple Vitamin (MVI, BARIATRIC ADVANTAGE MULTI-FORMULA, CHEW TAB)  Take 1 tablet by mouth daily             mupirocin (BACTROBAN) 2 % ointment  APPLY A SMALL AMOUNT TO THE AFFECTED AREA TWICE DAILY FOR 5 TO 10 DAYS             ondansetron (ZOFRAN-ODT) 4 MG disintegrating tablet  DISSOLVE 1 TABLET IN MOUTH EVERY 8 HOURS AS NEEDED FOR NAUSEA OR VOMITING             oxyCODONE-acetaminophen (PERCOCET) 5-325 MG per tablet  Take 1 tablet by mouth.  Every 4-6 hours PRN             pantoprazole (PROTONIX) 40 MG tablet  Take 1 tablet by mouth daily (with breakfast)             PARoxetine (PAXIL) 30 MG tablet  Take 50 mg by mouth nightly              potassium gluconate 550 mg tablet  Take 1 tablet by mouth daily             promethazine (PHENERGAN) 25 MG tablet  Take 1 tablet by mouth every 6 hours as needed for Nausea             scopolamine (TRANSDERM-SCOP) transdermal patch  APPLY 1 PATCH TRANSDERMALLY TO THE SKIN BEHIND THE EAR ONCE EVERY 3 DAYS AS NEEDED             tiZANidine (ZANAFLEX) 4 MG tablet  TAKE 1 TABLET BY MOUTH THREE TIMES DAILY AS NEEDED                   Diet:  DIET DENTAL SOFT;    Activity:   activity as tolerated     Discharge Condition:   good    Disposition:   home    Follow-up    Jaya Curtis MD  Go to  Medical Management  [place] [id number]  Neva Hoffman MD  Go to  Medical Management   [address]  [phone]   Justin Lind MD  Go to  If symptoms worsen  [address] [facility]  [phone]        Discharge Physician Signed: Scooby

## 2021-05-12 ENCOUNTER — CARE COORDINATION (OUTPATIENT)
Dept: CASE MANAGEMENT | Age: 51
End: 2021-05-12

## 2021-05-12 ENCOUNTER — OFFICE VISIT (OUTPATIENT)
Dept: INTERNAL MEDICINE CLINIC | Age: 51
End: 2021-05-12
Payer: COMMERCIAL

## 2021-05-12 ENCOUNTER — TELEPHONE (OUTPATIENT)
Dept: INFUSION THERAPY | Age: 51
End: 2021-05-12

## 2021-05-12 VITALS
HEART RATE: 79 BPM | HEIGHT: 64 IN | WEIGHT: 272 LBS | OXYGEN SATURATION: 97 % | TEMPERATURE: 97 F | SYSTOLIC BLOOD PRESSURE: 132 MMHG | BODY MASS INDEX: 46.44 KG/M2 | DIASTOLIC BLOOD PRESSURE: 86 MMHG

## 2021-05-12 DIAGNOSIS — F32.A CHRONIC DEPRESSION: ICD-10-CM

## 2021-05-12 DIAGNOSIS — F41.9 ANXIETY: ICD-10-CM

## 2021-05-12 DIAGNOSIS — I10 ESSENTIAL HYPERTENSION: ICD-10-CM

## 2021-05-12 DIAGNOSIS — Z98.84 STATUS POST LAPAROSCOPIC SLEEVE GASTRECTOMY: ICD-10-CM

## 2021-05-12 DIAGNOSIS — E03.4 HYPOTHYROIDISM DUE TO ACQUIRED ATROPHY OF THYROID: ICD-10-CM

## 2021-05-12 DIAGNOSIS — E66.01 MORBID OBESITY WITH BMI OF 40.0-44.9, ADULT (HCC): ICD-10-CM

## 2021-05-12 DIAGNOSIS — G40.909 SEIZURE DISORDER (HCC): ICD-10-CM

## 2021-05-12 DIAGNOSIS — D50.9 IRON DEFICIENCY ANEMIA, UNSPECIFIED IRON DEFICIENCY ANEMIA TYPE: ICD-10-CM

## 2021-05-12 DIAGNOSIS — G89.4 CHRONIC PAIN SYNDROME: ICD-10-CM

## 2021-05-12 DIAGNOSIS — R11.0 CHRONIC NAUSEA: Primary | ICD-10-CM

## 2021-05-12 DIAGNOSIS — E55.9 VITAMIN D DEFICIENCY: ICD-10-CM

## 2021-05-12 DIAGNOSIS — M85.80 OSTEOPENIA, UNSPECIFIED LOCATION: ICD-10-CM

## 2021-05-12 DIAGNOSIS — K58.9 IRRITABLE BOWEL SYNDROME, UNSPECIFIED TYPE: ICD-10-CM

## 2021-05-12 DIAGNOSIS — K21.9 GASTROESOPHAGEAL REFLUX DISEASE WITHOUT ESOPHAGITIS: ICD-10-CM

## 2021-05-12 PROCEDURE — 1036F TOBACCO NON-USER: CPT | Performed by: INTERNAL MEDICINE

## 2021-05-12 PROCEDURE — G8427 DOCREV CUR MEDS BY ELIG CLIN: HCPCS | Performed by: INTERNAL MEDICINE

## 2021-05-12 PROCEDURE — G8417 CALC BMI ABV UP PARAM F/U: HCPCS | Performed by: INTERNAL MEDICINE

## 2021-05-12 PROCEDURE — 1111F DSCHRG MED/CURRENT MED MERGE: CPT | Performed by: INTERNAL MEDICINE

## 2021-05-12 PROCEDURE — 99204 OFFICE O/P NEW MOD 45 MIN: CPT | Performed by: INTERNAL MEDICINE

## 2021-05-12 PROCEDURE — 3017F COLORECTAL CA SCREEN DOC REV: CPT | Performed by: INTERNAL MEDICINE

## 2021-05-12 NOTE — CARE COORDINATION
Johnny 45 Transitions Initial Follow Up Call    Call within 2 business days of discharge: Yes    Patient: Sharon Ozuna Patient : 1970   MRN: [id number]  Reason for Admission: Acute Gastritis   Discharge Date: 21 RARS: Readmission Risk Score: 38    Last Discharge [facility]       Complaint Diagnosis Description Type Department Provider    21 Emesis; Abdominal Pain Intractable nausea and vomiting ED to Hosp-Admission (Discharged) (ADMITTED) John Bosch MD; Lali Godinez. ..         Non-face-to-face services provided:  Obtained and reviewed discharge summary and/or continuity of care documents  Message to PCP, Dr Peter Officer, Heath Calzada LPN    Care Transitions 24 Hour Call    Schedule Follow Up Appointment with PCP: Misael Guy you have any ongoing symptoms?: Yes  Patient-reported symptoms: Vomiting, Nausea, Pain  Interventions for patient-reported symptoms: Notified PCP/Physician  Do you have a copy of your discharge instructions?: Yes  Do you have all of your prescriptions and are they filled?: Yes  Have you been contacted by a [address]?: No  Have you scheduled your follow up appointment?: Yes  How are you going to get to your appointment?: Car - family or friend to transport  Were you discharged with any Home Care or Post Acute Services: No  Do you feel like you have everything you need to keep you well at home?: Yes  Care Transitions Interventions   Home Care Waiver: Declined        DME Assistance: Declined    Palliative Care: Declined          Follow Up  Future Appointments   Date Time Provider Randy Monzon   2021  2:30 PM Mendel Juba, MD [facility] PATSY CARNEY   2021  3:00 PM Manjinder Dumont MD [address] [place]   2021  3:00 PM Timothy Light MD [facility]   2021  2:30 PM SCHEDULE, [facility] MED ONC LAB [facility] [place]   2021  2:15 PM [facility], [address]   2021  2:30 PM Dimitry Guerrero APRN - CNP [facility]     Challenges to be reviewed by the provider   Additional needs identified to be addressed with provider :    5/9/21-5/11/21 [facility] for Ac Gastritis    Patient reports continued n/v w/ yellow to green emesis, unable to hold food down, occasional loose stool, low grade fever,  severe pain mid upper abdominal pain wrapping right, josé miguel le muscle aching hips to thighs. Alternating Phenergan and Zofran. Percocet prn not providing adequate pain relief. Reports she was given Morphine in ED which provided significant pain relief. Plan for RYGB w/ Dr Yohannes Young in near future, Patient hoping to have procedure moved up. Has appt w/ Dr Maria Del Rosario Marie (new PCP) today at 2:30pm    Please further advise Patient. Thank Marcio Ho RN [phone]        Method of communication with provider : call to office, routed note    CARE TRANSITION/COVID19 [address] [place] [address],[address] SCREEN: 2/9/21 Negative; Not retested this admission  PATIENT RISK FACTORS: Lupus, Copd  RARS: 28  [facility] PCP: BECKI Royal    Spoke w/ Patient in length for discharge call. Patient very kind and engaged in conversation, education. Patient reports continued n/v w/ yellow to green emesis, unable to hold food down, occasional loose stool, low grade fever,  severe pain mid upper abdominal pain wrapping right, josé miguel le muscle aching hips to thighs. Alternating Phenergan and Zofran. Percocet prn not providing adequate pain relief. Reports hx of Chronic Pancreatitis w/ current sx similar to previous episodes of pancreatitis flares. Reviewed Pancreatitis education including:  -clear liquid diet, advance to bland foods (BRAT diet) as tolerated  -Gatorade  -small frequent meals w/ snacks rather than large meals  -low fat diet  -no alcohol   -avoid nsaids  -increase water intake    Patient verbalizes understanding. Reports tolerating Pedialyte.     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 Reports desire to get well. Support given. Patient is in prepping stage for planned RYGB w/ Dr Mark Gabriel. Reviewed Dr Josephine Edmond instruction from 5/7/21:  Journal all food intake. Keep calorie level at approximately 3088-0022. Protein intake is to be a minimum of 60-80 grams per day. Water drinking was encouraged with a goal of 64oz-128oz daily. Beverages to be calorie free except for milk. Every other beverage should be water. They are to avoid soda. Continue to increase level of physical activity. Patient verbalizes understanding of prep. Still drinking soda. Unable to adhere to suggested  protein, water intake d/t n/v. Patient hoping to have RYGB procedure moved up. Next appt w/ Dr Mark Gabriel 6/18/21. Patient has initial appt w/ Dr Kwaku Arenas (new PCP) today at 2:30, Mother to provide transportation. Advised to fully discuss current sx and all concerns at appt. Agreeable for CTN to route note. Patient reports she may have left hospital too soon and may need direct admit for fluids, pain relief. Reports she was not sleeping well at hospital and wanted to go home hoping rest would help, which has not. Advised to openly speak w/ MD re: unrelieved pain. Patient agreeable. Advised once established w/ new PCP to call 24/7 w/ any health concerns including above sx. Denies resource needs. Discussed benefits of HHC vs Palliative Care given severity of chronic on going sx. Discussed benefits of short term Palliative care for sx management. Patient to consider. Confirmed copy of AVS received, reviewed w/ Patient. Obtained and taking Protonix, Phenergan as directed. Deferred 1111F until next call as not feeling well and will be completing at todays PCP appt. Discussed hospital course, support given.  Email to Edward Moore LPN. Agreeable to ongoing CT follow up. Encouraged to call with any resource needs prior to next outreach.      Note routed to Dr Bo Khan and Dr Ramos Wang for further advisement    Gracie Deras RN

## 2021-05-12 NOTE — PROGRESS NOTES
Name: Jo Sanford  [id number]  Age: 44 y.o. YOB: 1970  Sex: female    CHIEF COMPLAINT:    Chief Complaint   Patient presents with    New Patient    Hypertension    Nausea & Vomiting     for 2 weeks    Other     hospital follow up       HISTORY OF PRESENT ILLNESS:     This is a pleasant  44 y.o. female  is seen today to establish care and  for management of chronic medical problems and medications refills. Previous records reviewed . She had multiple admissions and ER visits recently for abdominal pain and nausea and Vomiting. C/O being weak. C/O generalized aches and pains. On percocet from pain management. .  Denies CP or SOB. No fever , sore throat or cough or congestion. Denies any abdominal pain. Appetite OK. Bowels moving [address] No urinary symptoms. C/O chronic low back pain. Taking percocet from pain management. Hearing is ok. Vision Ok with glasses. Denies  any significant skin lesions. She has a history of seizure disorder but no recent seizures  C/O chronic depression. . Taking Paxil and it is not helping her. No other complaints. Labs reviewed from 5/11/2021. She is a status post gastric sleeve surgery in February 2019. Dr. Tracy Meza is planning  Gastric bypass surgery soon. She had one shot of Covid vaccine a month ago. . [employer] and going to have next dose soon.         Past Medical History:    Patient Active Problem List   Diagnosis    Fibromyalgia    Lupus (systemic lupus erythematosus) (HCC)    Chronic pancreatitis (HCC)    HTN (hypertension)    Generalized abdominal pain    Frequent UTI    Gastroesophageal reflux disease without esophagitis    Chronic depression    Fatty liver disease, nonalcoholic    Arthritis    Bilateral low back pain with left-sided sciatica    Intractable vomiting with nausea    Hiatal hernia    Status post laparoscopic sleeve gastrectomy    Pseudoseizure    Chronic pain syndrome    Drug-seeking/Aberrant behavior    Lymph node disorder    Morbid obesity with BMI of 40.0-44.9, adult (HCC)    Iron deficiency anemia    Encounter for weight management    Intractable nausea and vomiting    Seizure disorder (HCC)    Abdominal pain    Anxiety    RUQ pain    Intractable vomiting    Status post bariatric surgery    Morbid obesity with BMI of 45.0-49.9, adult (HCC)    Discoloration of skin of flank resembling ecchymosis    Morbid obesity with BMI of 50.0-59.9, adult (HCC)    Chest pain of uncertain etiology    Cellulitis of left thigh    Malabsorption    COPD (chronic obstructive pulmonary disease) (HCC)    Nausea and vomiting    Hypothyroidism due to acquired atrophy of thyroid    Osteopenia    Vitamin D deficiency    Irritable bowel syndrome        Past Surgical History:        Procedure Laterality Date    APPENDECTOMY  02/1998    Done When Tubes And Ovaries Were Removed    CARDIAC CATHETERIZATION  10/24/2010    CATHETER REMOVAL N/A 7/16/2019    PORT REMOVAL performed by Dontrell Yadav MD at [address]  08/27/1991    CHOLECYSTECTOMY, LAPAROSCOPIC  1990's    COLONOSCOPY  Last Done In 2000's    DENTAL SURGERY      Teeth Extracted In Past    ENDOSCOPY, COLON, DIAGNOSTIC  Last Done In 2018    FOOT DEBRIDEMENT Left 6/16/2019    FIRST METATARSAL DEBRIDEMENT INCISION AND DRAINAGE. EXCISION OF ULCER.  RESECTION OF BONE. 1ST METATARSAL POWER LAVAGE AND BONE CEMENT performed by Tami Nicholson DPM at [address] Left Last Done In 2016     Dr. Amber Lockwood, \" About 6 Surgeries Done Because Of Staph Infection\"    HIATAL HERNIA REPAIR N/A 2/12/2019    HERNIA HIATAL LAPAROSCOPIC ROBOTIC performed by Dontrell Yadav MD at [facility]  10/1997    Partial Abdominal Hysterectomy    INSERTION / REMOVAL / REPLACEMENT VENOUS ACCESS CATHETER N/A 8/15/2019    PORT INSERTION performed by Dontrell Yadav MD at [address] [place] [address]    removed after 6 months    LEG BIOPSY EXCISION Left 3/8/2021    LEFT THIGH LESION BIOPSY EXCISION performed by Dontrell Yadav MD at [facility], PARTIAL Left 2008    Benign    OTHER SURGICAL HISTORY  02/1998    \"Tubes And Ovaries Removed, Appendectomy Also Done\"    OTHER SURGICAL HISTORY  Last Done 7-15-16    Mediport Insertion \"Total Of Six Done, Removed Last Mediport In 2014\"    PORT SURGERY N/A 1/15/2020    PORT REMOVAL performed by Dontrell Yadav MD at [facility] N/A 7/6/2020    PORT INSERTION performed by Dontrell Yadav MD at [facility] N/A 2/12/2019    GASTRECTOMY SLEEVE LAPAROSCOPIC ROBOTIC performed by Dontrell Yadav MD at [facility]  08/27/2018    UPPER GASTROINTESTINAL ENDOSCOPY N/A 4/2/2019    EGD CONTROL HEMORRHAGE with epi injection at bleeding site performed by Dontrell Yadav MD at [address] [place] [address] N/A 6/19/2019    EGD DIAGNOSTIC ONLY performed by Dontrell Yadav MD at [address] [place] [address] N/A 7/22/2019    EGD DIAGNOSTIC ONLY performed by Lilliam Morris MD at [address] [place] [address] N/A 10/14/2019    EGD DIAGNOSTIC ONLY performed by Dontrell Yadav MD at [address] [place] [address] N/A 7/17/2020    EGJ DILATATION BALLOON WITH 18-20 MM BALLOON, DILATED TO 20 MM. performed by Dontrell Yadav MD at [facility] ENDOSCOPY       Social History:   Social History     Tobacco Use    Smoking status: Never Smoker    Smokeless tobacco: Never Used   Substance Use Topics    Alcohol use: No     Alcohol/week: 0.0 standard drinks       Family History:       Problem Relation Age of Onset    Diabetes Mother         \"Borderline Diabetes\"    High Blood Pressure Mother     Obesity Mother     Arthritis Mother     Heart Disease Mother     High Cholesterol Mother     Vision Loss Mother     Diabetes Father     High Blood Pressure Father     Asthma Father     Cancer Father         prostate cancer    High Blood Pressure Brother     Asthma Son     Vision Loss Son     Lupus Daughter     Other Daughter         \"Alot Of Female Problems\"       Allergies:  Aspirin, Shellfish-derived products, Toradol [ketorolac tromethamine], Compazine [prochlorperazine], and Reglan [metoclopramide]    Current Medications :      Prior to Admission medications    Medication Sig Start Date End Date Taking?  Authorizing Provider   pantoprazole (PROTONIX) 40 MG tablet Take 1 tablet by mouth daily (with breakfast) 5/11/21  Yes Perla Vazquez MD   promethazine (PHENERGAN) 25 MG tablet Take 1 tablet by mouth every 6 hours as needed for Nausea 5/7/21 5/14/21 Yes Diana Núñez MD   albuterol (PROVENTIL) (2.5 MG/3ML) 0.083% nebulizer solution Take 3 mLs by nebulization every 6 hours as needed for Wheezing 4/7/21  Yes MICHAEL Contreras CNP   hydrOXYzine (VISTARIL) 50 MG capsule Take 1 capsule by mouth every 6 hours as needed for Anxiety 4/7/21  Yes MICHAEL Contreras CNP   betamethasone valerate (VALISONE) 0.1 % ointment APPLY OINTMENT TO AFFECTED AREA TWICE DAILY TO HANDS AND ELBOWS FOR 21 DAYS 3/26/21  Yes Historical Provider, MD   EQ PINK-BISMUTH 262 MG chewable tablet CHEW & SWALLOW 2 TABLETS BY MOUTH 4 TIMES DAILY BEFORE MEAL(S) AND NIGHTLY FOR 5 DAYS 3/26/21  Yes Historical Provider, MD   cloNIDine (CATAPRES) 0.1 MG tablet TAKE 1 TABLET BY MOUTH TWICE DAILY 1/4/21  Yes Historical Provider, MD   mupirocin (BACTROBAN) 2 % ointment APPLY A SMALL AMOUNT TO THE AFFECTED AREA TWICE DAILY FOR 5 TO 10 DAYS 3/27/21  Yes Historical Provider, MD   ondansetron (ZOFRAN-ODT) 4 MG disintegrating tablet DISSOLVE 1 TABLET IN MOUTH EVERY 8 HOURS AS NEEDED FOR NAUSEA OR VOMITING 3/26/21  Yes Historical Provider, MD   scopolamine (TRANSDERM-SCOP) transdermal patch APPLY 1 PATCH TRANSDERMALLY TO THE SKIN BEHIND THE EAR ONCE EVERY 3 DAYS AS NEEDED 1/4/21  Yes Historical Provider, MD   potassium gluconate 550 mg tablet Take 1 tablet by mouth daily 3/25/21  Yes Historical Provider, MD   tiZANidine (ZANAFLEX) 4 MG tablet TAKE 1 TABLET BY MOUTH THREE TIMES DAILY AS NEEDED 3/3/21  Yes Historical Provider, MD   albuterol sulfate  (90 Base) MCG/ACT inhaler Inhale 2 puffs into the lungs 4 times daily 2/8/21  Yes Dante Carrizales MD   ferrous sulfate (IRON 325) 325 (65 Fe) MG tablet Take 1 tablet by mouth daily (with breakfast) 10/23/20  Yes Anne-Marie Guerrero MD   levETIRAcetam (KEPPRA) 1000 MG tablet Take 1 tablet by mouth 2 times daily 7/19/20  Yes Zack Monzon MD   dicyclomine (BENTYL) 10 MG capsule Take 1 capsule by mouth 3 times daily as needed (abdominal pain) 4/13/20  Yes Lucila Madrigal [address] [place] [address], [facility]   oxyCODONE-acetaminophen (PERCOCET) 5-325 MG per tablet Take 1 tablet by mouth. Every 4-6 hours PRN   Yes Historical Provider, MD   Cholecalciferol (VITAMIN D3) 5000 units TABS Take 1 tablet by mouth daily   Yes Historical Provider, MD   estradiol (ESTRACE) 0.5 MG tablet Take 0.5 mg by mouth daily   Yes Historical Provider, MD   atenolol (TENORMIN) 25 MG tablet Take 25 mg by mouth daily   Yes Historical Provider, MD   Multiple Vitamin (MVI, BARIATRIC ADVANTAGE MULTI-FORMULA, CHEW TAB) Take 1 tablet by mouth daily 2/22/19  Yes Anne-Marie Guerrero MD   Calcium Citrate-Vitamin D (CALCIUM + VIT D, BARIATRIC ADVANTAGE, CHEWABLE TABLET) Take 1 tablet by mouth 2 times daily 2/22/19  Yes Anne-Marie Guerrero MD   levothyroxine (SYNTHROID) 125 MCG tablet Take 1 tablet by mouth Daily  Patient taking differently: Take 125 mcg by mouth nightly  8/8/18  Yes Young Escobedo MD   gabapentin (NEURONTIN) 800 MG tablet Take 800 mg by mouth 3 times daily   Yes Historical Provider, MD   PARoxetine (PAXIL) 30 MG tablet Take 50 mg by mouth nightly    Yes Historical Provider, MD       LAB DATA: Reviewed.     REVIEW OF SYSTEMS:   see HPI/ Comprehensive review of systems negative except for the ones mentioned in HPI. PHYSICAL EXAMINATION:   /86 (Site: Left Upper Arm)   Pulse 79   Temp 97 °F (36.1 °C)   Ht 5' 4\" (1.626 m)   Wt 272 lb (123.4 kg)   SpO2 97%   BMI 46.69 kg/m²      GENERAL APPEARANCE:    Alert, oriented x 3, well developed, cooperative, not in any distress, appears stated age. HEAD:   Normocephalic, atraumatic   EYES:   PERRLA, EOMI, lids normal, conjuctivea clear, sclera anicteric. NECK:    Supple, symmetrical,  trachea midline, no thyromegaly, no JVD, no lymphadenopathy. LUNGS:    Clear to auscultation bilaterally, respirations unlabored, accessory muscles are not used. HEART:     Regular rate and rhythm, S1 and S2 normal, no murmur, rub or gallop. PMI in MCL. ABDOMEN:    Soft, non-tender, bowel sounds are normoactive, no masses, no hepatospleenomegaly. .  Patient is morbidly obese  EXTREMITY:   no bipedal edema  NEURO:  Alert, oriented to person, place and time. Grossly intact. Musculoskeletal:         No kyphosis or scoliosis, Tenderness lower back and multiple joints. Skin:                            Warm and dry. No rash or obvious suspicious lesions. PSYCH:  Mood euthymic, insight and judgement good. ASSESSMENT/PLAN:    1. Chronic nausea  Continue Phenergan as needed. Nausea, possibly secondary to reflux. 2. Gastroesophageal reflux disease without esophagitis  Continue Protonix    3. Irritable bowel syndrome, unspecified type  Patient on Bentyl as needed    4. Morbid obesity with BMI of 40.0-44.9, adult ([facility] [address]  Advised diet, exercise and weight loss. She is going for Jet-en-Y gastric bypass surgery soon. 5. Status post laparoscopic sleeve gastrectomy  Being followed by bariatric surgeon periodically. 6. Chronic pain syndrome  Continue Percocet and Neurontin and Zanaflex from pain management. 7. Essential hypertension  Continue current medications, denies side effect with medicationss.   Low salt diet and exercise advised. Continue Catapres,Tenormin    8. Anxiety  Continue Vistaril for now. Refer to psychiatrist.  - External Referral To Psychiatry  - hydrOXYzine (VISTARIL) 50 MG capsule; Take 1 capsule by mouth every 6 hours as needed for Anxiety  Dispense: 30 capsule; Refill: 3    9. Chronic depression  Continue Paxil.  - External Referral To Psychiatry    10. Hypothyroidism due to acquired atrophy of thyroid  Patient on Synthroid    11. Osteopenia, unspecified location  Continue Os-Israel plus D and vitamin D. 12. Vitamin D deficiency  Continue vitamin D 3    13. Seizure disorder [facility])  Patient on Keppra    14. Iron deficiency anemia, unspecified iron deficiency anemia type  Patient is getting iron infusion periodically and is on iron pills   Monitor hemoglobin closely. I have recommended that the patient follow CDC guidelines for prevention of COVID-19 infection. I also discussed Coronavirus precaution including wearing face mask, handwashing practice, wiping items touched in public such as gas pumps, door handles, shopping carts, etc. Also Self monitoring for infection - fever, chills, cough, SOB. Should he/she develop symptoms he/she should call office or go to ER for further instructions. Care discussed with patient. Questions answered and patient verbalizes understanding and agrees with plan. Medications reviewed and reconciled. Continue current medications. Appropriate prescriptions are ordered. Risks and benefits of meds are discussed. After visit summary provided. Advised to call for any problems, questions, or concerns. If symptoms worsen or don't improve as expected, to call us or go to ER. Follow up as directed, sooner if needed. Return in about 4 weeks (around 6/9/2021). This dictation was performed with a verbal recognition program and it was checked for errors. It is possible that there are still dictated errors within this office note.  Any errors should be brought immediately to my attention for correction. All efforts were made to ensure that this office note is accurate.      Ha Patricio MD

## 2021-05-12 NOTE — TELEPHONE ENCOUNTER
Pt called  to make an OV & call was transferred to me for her iron appt. Pt was just released from the hospital & said she received iron while in the hospital. Pt is being seen in the office tomorrow & provider can review what she was given in the hospital then advise what needs to still be scheduled; pt voiced understanding.

## 2021-05-13 RX ORDER — ATENOLOL 25 MG/1
25 TABLET ORAL DAILY
Qty: 30 TABLET | Refills: 3 | Status: SHIPPED | OUTPATIENT
Start: 2021-05-13 | End: 2021-09-23

## 2021-05-13 RX ORDER — PAROXETINE 30 MG/1
50 TABLET, FILM COATED ORAL NIGHTLY
Qty: 30 TABLET | Refills: 3 | Status: SHIPPED | OUTPATIENT
Start: 2021-05-13 | End: 2021-09-16 | Stop reason: SDUPTHER

## 2021-05-13 RX ORDER — ESTRADIOL 0.5 MG/1
0.5 TABLET ORAL DAILY
Qty: 21 TABLET | Refills: 3 | Status: ON HOLD | OUTPATIENT
Start: 2021-05-13 | End: 2021-08-18 | Stop reason: HOSPADM

## 2021-05-13 RX ORDER — CLONIDINE HYDROCHLORIDE 0.1 MG/1
0.1 TABLET ORAL 2 TIMES DAILY
Qty: 60 TABLET | Refills: 3 | Status: SHIPPED | OUTPATIENT
Start: 2021-05-13 | End: 2021-09-16 | Stop reason: SDUPTHER

## 2021-05-13 RX ORDER — DICYCLOMINE HYDROCHLORIDE 10 MG/1
10 CAPSULE ORAL 3 TIMES DAILY PRN
Qty: 21 CAPSULE | Refills: 3 | Status: ON HOLD | OUTPATIENT
Start: 2021-05-13 | End: 2021-08-18 | Stop reason: HOSPADM

## 2021-05-13 RX ORDER — LEVETIRACETAM 1000 MG/1
1000 TABLET ORAL 2 TIMES DAILY
Qty: 60 TABLET | Refills: 3 | Status: SHIPPED | OUTPATIENT
Start: 2021-05-13 | End: 2022-04-14

## 2021-05-13 RX ORDER — HYDROXYZINE PAMOATE 50 MG/1
50 CAPSULE ORAL EVERY 6 HOURS PRN
Qty: 30 CAPSULE | Refills: 3 | Status: SHIPPED | OUTPATIENT
Start: 2021-05-13 | End: 2021-07-28

## 2021-05-13 RX ORDER — LEVOTHYROXINE SODIUM 0.12 MG/1
125 TABLET ORAL DAILY
Qty: 30 TABLET | Refills: 3 | Status: SHIPPED | OUTPATIENT
Start: 2021-05-13 | End: 2021-09-16 | Stop reason: SDUPTHER

## 2021-05-26 ENCOUNTER — HOSPITAL ENCOUNTER (OUTPATIENT)
Age: 51
Setting detail: OBSERVATION
Discharge: HOME | End: 2021-05-30
Attending: STUDENT IN AN ORGANIZED HEALTH CARE EDUCATION/TRAINING PROGRAM | Admitting: STUDENT IN AN ORGANIZED HEALTH CARE EDUCATION/TRAINING PROGRAM
Payer: COMMERCIAL

## 2021-05-26 DIAGNOSIS — R10.9 ABDOMINAL PAIN, UNSPECIFIED ABDOMINAL LOCATION: ICD-10-CM

## 2021-05-26 DIAGNOSIS — R11.2 INTRACTABLE VOMITING WITH NAUSEA, UNSPECIFIED VOMITING TYPE: Primary | ICD-10-CM

## 2021-05-26 DIAGNOSIS — R51.9 HEADACHE, UNSPECIFIED HEADACHE TYPE: ICD-10-CM

## 2021-05-26 PROCEDURE — 96376 TX/PRO/DX INJ SAME DRUG ADON: CPT

## 2021-05-26 PROCEDURE — 96372 THER/PROPH/DIAG INJ SC/IM: CPT

## 2021-05-26 PROCEDURE — 96365 THER/PROPH/DIAG IV INF INIT: CPT

## 2021-05-26 PROCEDURE — 96361 HYDRATE IV INFUSION ADD-ON: CPT

## 2021-05-26 PROCEDURE — 99283 EMERGENCY DEPT VISIT LOW MDM: CPT

## 2021-05-26 PROCEDURE — 93005 ELECTROCARDIOGRAM TRACING: CPT | Performed by: PHYSICIAN ASSISTANT

## 2021-05-26 PROCEDURE — 96375 TX/PRO/DX INJ NEW DRUG ADDON: CPT

## 2021-05-26 PROCEDURE — 96374 THER/PROPH/DIAG INJ IV PUSH: CPT

## 2021-05-26 RX ORDER — 0.9 % SODIUM CHLORIDE 0.9 %
1000 INTRAVENOUS SOLUTION INTRAVENOUS ONCE
Status: COMPLETED | OUTPATIENT
Start: 2021-05-27 | End: 2021-05-27

## 2021-05-26 ASSESSMENT — PAIN SCALES - GENERAL: PAINLEVEL_OUTOF10: 8

## 2021-05-27 ENCOUNTER — ANESTHESIA EVENT (OUTPATIENT)
Dept: ENDOSCOPY | Age: 51
End: 2021-05-27
Payer: COMMERCIAL

## 2021-05-27 LAB
ALBUMIN SERPL-MCNC: 4.8 GM/DL (ref 3.4–5)
ALP BLD-CCNC: 93 IU/L (ref 40–129)
ALT SERPL-CCNC: 16 U/L (ref 10–40)
ANION GAP SERPL CALCULATED.3IONS-SCNC: 10 MMOL/L (ref 4–16)
AST SERPL-CCNC: 17 IU/L (ref 15–37)
BACTERIA: NEGATIVE /HPF
BASOPHILS ABSOLUTE: 0 K/CU MM
BASOPHILS RELATIVE PERCENT: 0.9 % (ref 0–1)
BILIRUB SERPL-MCNC: 0.2 MG/DL (ref 0–1)
BILIRUBIN URINE: NEGATIVE MG/DL
BLOOD, URINE: NEGATIVE
BUN BLDV-MCNC: 23 MG/DL (ref 6–23)
CALCIUM SERPL-MCNC: 10.7 MG/DL (ref 8.3–10.6)
CHLORIDE BLD-SCNC: 104 MMOL/L (ref 99–110)
CLARITY: CLEAR
CO2: 23 MMOL/L (ref 21–32)
COLOR: YELLOW
CREAT SERPL-MCNC: 0.9 MG/DL (ref 0.6–1.1)
DIFFERENTIAL TYPE: ABNORMAL
EKG ATRIAL RATE: 75 BPM
EKG DIAGNOSIS: NORMAL
EKG P AXIS: 39 DEGREES
EKG P-R INTERVAL: 184 MS
EKG Q-T INTERVAL: 382 MS
EKG QRS DURATION: 86 MS
EKG QTC CALCULATION (BAZETT): 426 MS
EKG R AXIS: -16 DEGREES
EKG T AXIS: 20 DEGREES
EKG VENTRICULAR RATE: 75 BPM
EOSINOPHILS ABSOLUTE: 0.3 K/CU MM
EOSINOPHILS RELATIVE PERCENT: 6.2 % (ref 0–3)
GFR AFRICAN AMERICAN: >60 ML/MIN/1.73M2
GFR NON-AFRICAN AMERICAN: >60 ML/MIN/1.73M2
GLUCOSE BLD-MCNC: 96 MG/DL (ref 70–99)
GLUCOSE, URINE: NEGATIVE MG/DL
HCT VFR BLD CALC: 35.5 % (ref 37–47)
HEMOGLOBIN: 11.4 GM/DL (ref 12.5–16)
HYALINE CASTS: 5 /LPF
IMMATURE NEUTROPHIL %: 0.2 % (ref 0–0.43)
KETONES, URINE: NEGATIVE MG/DL
LEUKOCYTE ESTERASE, URINE: NEGATIVE
LIPASE: 20 IU/L (ref 13–60)
LYMPHOCYTES ABSOLUTE: 1.8 K/CU MM
LYMPHOCYTES RELATIVE PERCENT: 38.5 % (ref 24–44)
MCH RBC QN AUTO: 26.1 PG (ref 27–31)
MCHC RBC AUTO-ENTMCNC: 32.1 % (ref 32–36)
MCV RBC AUTO: 81.2 FL (ref 78–100)
MONOCYTES ABSOLUTE: 0.3 K/CU MM
MONOCYTES RELATIVE PERCENT: 7.3 % (ref 0–4)
MUCUS: ABNORMAL HPF
NITRITE URINE, QUANTITATIVE: NEGATIVE
NUCLEATED RBC %: 0 %
PDW BLD-RTO: 15.1 % (ref 11.7–14.9)
PH, URINE: 5 (ref 5–8)
PLATELET # BLD: 181 K/CU MM (ref 140–440)
PMV BLD AUTO: 10 FL (ref 7.5–11.1)
POTASSIUM SERPL-SCNC: 4.1 MMOL/L (ref 3.5–5.1)
PROTEIN UA: NEGATIVE MG/DL
RBC # BLD: 4.37 M/CU MM (ref 4.2–5.4)
RBC URINE: ABNORMAL /HPF (ref 0–6)
SARS-COV-2: NOT DETECTED
SEGMENTED NEUTROPHILS ABSOLUTE COUNT: 2.2 K/CU MM
SEGMENTED NEUTROPHILS RELATIVE PERCENT: 46.9 % (ref 36–66)
SODIUM BLD-SCNC: 137 MMOL/L (ref 135–145)
SOURCE: NORMAL
SPECIFIC GRAVITY UA: 1.03 (ref 1–1.03)
SQUAMOUS EPITHELIAL: 1 /HPF
TOTAL IMMATURE NEUTOROPHIL: 0.01 K/CU MM
TOTAL NUCLEATED RBC: 0 K/CU MM
TOTAL PROTEIN: 7.3 GM/DL (ref 6.4–8.2)
TRICHOMONAS: ABNORMAL /HPF
TROPONIN T: <0.01 NG/ML
UROBILINOGEN, URINE: NEGATIVE MG/DL (ref 0.2–1)
WBC # BLD: 4.7 K/CU MM (ref 4–10.5)
WBC UA: ABNORMAL /HPF (ref 0–5)

## 2021-05-27 PROCEDURE — 6360000002 HC RX W HCPCS: Performed by: STUDENT IN AN ORGANIZED HEALTH CARE EDUCATION/TRAINING PROGRAM

## 2021-05-27 PROCEDURE — 6360000002 HC RX W HCPCS: Performed by: INTERNAL MEDICINE

## 2021-05-27 PROCEDURE — 2580000003 HC RX 258: Performed by: STUDENT IN AN ORGANIZED HEALTH CARE EDUCATION/TRAINING PROGRAM

## 2021-05-27 PROCEDURE — 85025 COMPLETE CBC W/AUTO DIFF WBC: CPT

## 2021-05-27 PROCEDURE — 6360000002 HC RX W HCPCS: Performed by: SURGERY

## 2021-05-27 PROCEDURE — 84484 ASSAY OF TROPONIN QUANT: CPT

## 2021-05-27 PROCEDURE — 96366 THER/PROPH/DIAG IV INF ADDON: CPT

## 2021-05-27 PROCEDURE — 2500000003 HC RX 250 WO HCPCS: Performed by: PHYSICIAN ASSISTANT

## 2021-05-27 PROCEDURE — 96361 HYDRATE IV INFUSION ADD-ON: CPT

## 2021-05-27 PROCEDURE — 2580000003 HC RX 258: Performed by: PHYSICIAN ASSISTANT

## 2021-05-27 PROCEDURE — 80053 COMPREHEN METABOLIC PANEL: CPT

## 2021-05-27 PROCEDURE — 96376 TX/PRO/DX INJ SAME DRUG ADON: CPT

## 2021-05-27 PROCEDURE — 99222 1ST HOSP IP/OBS MODERATE 55: CPT | Performed by: SURGERY

## 2021-05-27 PROCEDURE — 83690 ASSAY OF LIPASE: CPT

## 2021-05-27 PROCEDURE — G0378 HOSPITAL OBSERVATION PER HR: HCPCS

## 2021-05-27 PROCEDURE — U0003 INFECTIOUS AGENT DETECTION BY NUCLEIC ACID (DNA OR RNA); SEVERE ACUTE RESPIRATORY SYNDROME CORONAVIRUS 2 (SARS-COV-2) (CORONAVIRUS DISEASE [COVID-19]), AMPLIFIED PROBE TECHNIQUE, MAKING USE OF HIGH THROUGHPUT TECHNOLOGIES AS DESCRIBED BY CMS-2020-01-R: HCPCS

## 2021-05-27 PROCEDURE — 6370000000 HC RX 637 (ALT 250 FOR IP): Performed by: PHYSICIAN ASSISTANT

## 2021-05-27 PROCEDURE — C9113 INJ PANTOPRAZOLE SODIUM, VIA: HCPCS | Performed by: STUDENT IN AN ORGANIZED HEALTH CARE EDUCATION/TRAINING PROGRAM

## 2021-05-27 PROCEDURE — 96372 THER/PROPH/DIAG INJ SC/IM: CPT

## 2021-05-27 PROCEDURE — 81001 URINALYSIS AUTO W/SCOPE: CPT

## 2021-05-27 PROCEDURE — 93010 ELECTROCARDIOGRAM REPORT: CPT | Performed by: INTERNAL MEDICINE

## 2021-05-27 PROCEDURE — 96375 TX/PRO/DX INJ NEW DRUG ADDON: CPT

## 2021-05-27 PROCEDURE — 96365 THER/PROPH/DIAG IV INF INIT: CPT

## 2021-05-27 PROCEDURE — 6360000002 HC RX W HCPCS: Performed by: PHYSICIAN ASSISTANT

## 2021-05-27 PROCEDURE — U0005 INFEC AGEN DETEC AMPLI PROBE: HCPCS

## 2021-05-27 RX ORDER — PROMETHAZINE HYDROCHLORIDE 25 MG/ML
25 INJECTION, SOLUTION INTRAMUSCULAR; INTRAVENOUS ONCE
Status: COMPLETED | OUTPATIENT
Start: 2021-05-27 | End: 2021-05-27

## 2021-05-27 RX ORDER — MORPHINE SULFATE 4 MG/ML
2 INJECTION, SOLUTION INTRAMUSCULAR; INTRAVENOUS
Status: DISCONTINUED | OUTPATIENT
Start: 2021-05-27 | End: 2021-05-30 | Stop reason: HOSPADM

## 2021-05-27 RX ORDER — ONDANSETRON 4 MG/1
4 TABLET, ORALLY DISINTEGRATING ORAL EVERY 8 HOURS PRN
Status: DISCONTINUED | OUTPATIENT
Start: 2021-05-27 | End: 2021-05-30 | Stop reason: HOSPADM

## 2021-05-27 RX ORDER — DIPHENHYDRAMINE HYDROCHLORIDE 50 MG/ML
25 INJECTION INTRAMUSCULAR; INTRAVENOUS ONCE
Status: COMPLETED | OUTPATIENT
Start: 2021-05-27 | End: 2021-05-27

## 2021-05-27 RX ORDER — SODIUM CHLORIDE, SODIUM LACTATE, POTASSIUM CHLORIDE, CALCIUM CHLORIDE 600; 310; 30; 20 MG/100ML; MG/100ML; MG/100ML; MG/100ML
INJECTION, SOLUTION INTRAVENOUS CONTINUOUS
Status: DISCONTINUED | OUTPATIENT
Start: 2021-05-27 | End: 2021-05-27

## 2021-05-27 RX ORDER — PROMETHAZINE HYDROCHLORIDE 25 MG/ML
25 INJECTION, SOLUTION INTRAMUSCULAR; INTRAVENOUS EVERY 6 HOURS PRN
Status: DISCONTINUED | OUTPATIENT
Start: 2021-05-27 | End: 2021-05-27

## 2021-05-27 RX ORDER — SODIUM CHLORIDE 0.9 % (FLUSH) 0.9 %
5-40 SYRINGE (ML) INJECTION PRN
Status: DISCONTINUED | OUTPATIENT
Start: 2021-05-27 | End: 2021-05-30 | Stop reason: HOSPADM

## 2021-05-27 RX ORDER — HALOPERIDOL 5 MG/ML
5 INJECTION INTRAMUSCULAR ONCE
Status: DISCONTINUED | OUTPATIENT
Start: 2021-05-27 | End: 2021-05-27

## 2021-05-27 RX ORDER — ALBUTEROL SULFATE 2.5 MG/3ML
2.5 SOLUTION RESPIRATORY (INHALATION) EVERY 6 HOURS PRN
Status: DISCONTINUED | OUTPATIENT
Start: 2021-05-27 | End: 2021-05-30 | Stop reason: HOSPADM

## 2021-05-27 RX ORDER — ACETAMINOPHEN 650 MG/1
650 SUPPOSITORY RECTAL EVERY 6 HOURS PRN
Status: DISCONTINUED | OUTPATIENT
Start: 2021-05-27 | End: 2021-05-30 | Stop reason: HOSPADM

## 2021-05-27 RX ORDER — POLYETHYLENE GLYCOL 3350 17 G/17G
17 POWDER, FOR SOLUTION ORAL DAILY PRN
Status: DISCONTINUED | OUTPATIENT
Start: 2021-05-27 | End: 2021-05-30 | Stop reason: HOSPADM

## 2021-05-27 RX ORDER — SODIUM CHLORIDE 9 MG/ML
25 INJECTION, SOLUTION INTRAVENOUS PRN
Status: DISCONTINUED | OUTPATIENT
Start: 2021-05-27 | End: 2021-05-30 | Stop reason: HOSPADM

## 2021-05-27 RX ORDER — PROMETHAZINE HYDROCHLORIDE 25 MG/ML
12.5 INJECTION, SOLUTION INTRAMUSCULAR; INTRAVENOUS EVERY 6 HOURS PRN
Status: DISCONTINUED | OUTPATIENT
Start: 2021-05-27 | End: 2021-05-30 | Stop reason: HOSPADM

## 2021-05-27 RX ORDER — ACETAMINOPHEN 325 MG/1
650 TABLET ORAL EVERY 6 HOURS PRN
Status: DISCONTINUED | OUTPATIENT
Start: 2021-05-27 | End: 2021-05-30 | Stop reason: HOSPADM

## 2021-05-27 RX ORDER — ONDANSETRON 2 MG/ML
4 INJECTION INTRAMUSCULAR; INTRAVENOUS EVERY 6 HOURS PRN
Status: DISCONTINUED | OUTPATIENT
Start: 2021-05-27 | End: 2021-05-30 | Stop reason: HOSPADM

## 2021-05-27 RX ORDER — ONDANSETRON 2 MG/ML
4 INJECTION INTRAMUSCULAR; INTRAVENOUS ONCE
Status: COMPLETED | OUTPATIENT
Start: 2021-05-27 | End: 2021-05-27

## 2021-05-27 RX ORDER — MECLIZINE HYDROCHLORIDE 25 MG/1
25 TABLET ORAL 3 TIMES DAILY PRN
COMMUNITY

## 2021-05-27 RX ORDER — MORPHINE SULFATE 4 MG/ML
4 INJECTION, SOLUTION INTRAMUSCULAR; INTRAVENOUS ONCE
Status: COMPLETED | OUTPATIENT
Start: 2021-05-27 | End: 2021-05-27

## 2021-05-27 RX ORDER — MORPHINE SULFATE 4 MG/ML
2 INJECTION, SOLUTION INTRAMUSCULAR; INTRAVENOUS EVERY 4 HOURS PRN
Status: DISCONTINUED | OUTPATIENT
Start: 2021-05-27 | End: 2021-05-27

## 2021-05-27 RX ORDER — PROMETHAZINE HYDROCHLORIDE 25 MG/ML
25 INJECTION, SOLUTION INTRAMUSCULAR; INTRAVENOUS EVERY 6 HOURS PRN
Status: DISCONTINUED | OUTPATIENT
Start: 2021-05-27 | End: 2021-05-30 | Stop reason: HOSPADM

## 2021-05-27 RX ORDER — PANTOPRAZOLE SODIUM 40 MG/10ML
40 INJECTION, POWDER, LYOPHILIZED, FOR SOLUTION INTRAVENOUS DAILY
Status: DISCONTINUED | OUTPATIENT
Start: 2021-05-27 | End: 2021-05-30 | Stop reason: HOSPADM

## 2021-05-27 RX ORDER — ONDANSETRON 2 MG/ML
4 INJECTION INTRAMUSCULAR; INTRAVENOUS EVERY 4 HOURS
Status: DISCONTINUED | OUTPATIENT
Start: 2021-05-27 | End: 2021-05-30 | Stop reason: HOSPADM

## 2021-05-27 RX ORDER — SODIUM CHLORIDE 0.9 % (FLUSH) 0.9 %
5-40 SYRINGE (ML) INJECTION EVERY 12 HOURS SCHEDULED
Status: DISCONTINUED | OUTPATIENT
Start: 2021-05-27 | End: 2021-05-30 | Stop reason: HOSPADM

## 2021-05-27 RX ORDER — PROMETHAZINE HYDROCHLORIDE 25 MG/ML
12.5 INJECTION, SOLUTION INTRAMUSCULAR; INTRAVENOUS EVERY 6 HOURS PRN
Status: DISCONTINUED | OUTPATIENT
Start: 2021-05-27 | End: 2021-05-27

## 2021-05-27 RX ORDER — PHENOL 1.4 %
10-20 AEROSOL, SPRAY (ML) MUCOUS MEMBRANE NIGHTLY PRN
COMMUNITY

## 2021-05-27 RX ADMIN — ONDANSETRON 4 MG: 2 INJECTION INTRAMUSCULAR; INTRAVENOUS at 03:09

## 2021-05-27 RX ADMIN — SODIUM CHLORIDE, POTASSIUM CHLORIDE, SODIUM LACTATE AND CALCIUM CHLORIDE 100 ML/HR: 600; 310; 30; 20 INJECTION, SOLUTION INTRAVENOUS at 07:49

## 2021-05-27 RX ADMIN — MORPHINE SULFATE 2 MG: 4 INJECTION, SOLUTION INTRAMUSCULAR; INTRAVENOUS at 22:54

## 2021-05-27 RX ADMIN — DIPHENHYDRAMINE HYDROCHLORIDE 25 MG: 50 INJECTION, SOLUTION INTRAMUSCULAR; INTRAVENOUS at 22:53

## 2021-05-27 RX ADMIN — MORPHINE SULFATE 2 MG: 4 INJECTION, SOLUTION INTRAMUSCULAR; INTRAVENOUS at 18:04

## 2021-05-27 RX ADMIN — ONDANSETRON 4 MG: 2 INJECTION INTRAMUSCULAR; INTRAVENOUS at 09:01

## 2021-05-27 RX ADMIN — FAMOTIDINE 20 MG: 10 INJECTION, SOLUTION INTRAVENOUS at 03:10

## 2021-05-27 RX ADMIN — PROMETHAZINE HYDROCHLORIDE 25 MG: 25 INJECTION INTRAMUSCULAR; INTRAVENOUS at 01:05

## 2021-05-27 RX ADMIN — MORPHINE SULFATE 2 MG: 4 INJECTION, SOLUTION INTRAMUSCULAR; INTRAVENOUS at 15:06

## 2021-05-27 RX ADMIN — ONDANSETRON 4 MG: 2 INJECTION INTRAMUSCULAR; INTRAVENOUS at 22:54

## 2021-05-27 RX ADMIN — PANTOPRAZOLE SODIUM 40 MG: 40 INJECTION, POWDER, FOR SOLUTION INTRAVENOUS at 09:07

## 2021-05-27 RX ADMIN — ONDANSETRON 4 MG: 2 INJECTION INTRAMUSCULAR; INTRAVENOUS at 18:04

## 2021-05-27 RX ADMIN — LEVETIRACETAM 1000 MG: 100 INJECTION, SOLUTION INTRAVENOUS at 22:49

## 2021-05-27 RX ADMIN — MORPHINE SULFATE 2 MG: 4 INJECTION, SOLUTION INTRAMUSCULAR; INTRAVENOUS at 09:03

## 2021-05-27 RX ADMIN — SODIUM CHLORIDE, PRESERVATIVE FREE 10 ML: 5 INJECTION INTRAVENOUS at 22:54

## 2021-05-27 RX ADMIN — MORPHINE SULFATE 4 MG: 4 INJECTION, SOLUTION INTRAMUSCULAR; INTRAVENOUS at 01:05

## 2021-05-27 RX ADMIN — HYOSCYAMINE SULFATE 125 MCG: 0.12 TABLET ORAL; SUBLINGUAL at 02:38

## 2021-05-27 RX ADMIN — MORPHINE SULFATE 2 MG: 4 INJECTION, SOLUTION INTRAMUSCULAR; INTRAVENOUS at 04:30

## 2021-05-27 RX ADMIN — SODIUM CHLORIDE 1000 ML: 9 INJECTION, SOLUTION INTRAVENOUS at 01:02

## 2021-05-27 RX ADMIN — ENOXAPARIN SODIUM 40 MG: 40 INJECTION SUBCUTANEOUS at 09:05

## 2021-05-27 RX ADMIN — LEVETIRACETAM 1000 MG: 100 INJECTION, SOLUTION INTRAVENOUS at 07:48

## 2021-05-27 ASSESSMENT — ENCOUNTER SYMPTOMS
SHORTNESS OF BREATH: 1
BACK PAIN: 0
SHORTNESS OF BREATH: 0
SORE THROAT: 0
BLOOD IN STOOL: 0
CHEST TIGHTNESS: 0
ABDOMINAL PAIN: 1
ANAL BLEEDING: 0
RHINORRHEA: 0
COUGH: 0
COLOR CHANGE: 0
DIARRHEA: 1
WHEEZING: 0
NAUSEA: 1
CONSTIPATION: 0
VOMITING: 1

## 2021-05-27 ASSESSMENT — PAIN DESCRIPTION - ORIENTATION: ORIENTATION: MID;RIGHT;LEFT

## 2021-05-27 ASSESSMENT — LIFESTYLE VARIABLES: SMOKING_STATUS: 0

## 2021-05-27 ASSESSMENT — PAIN - FUNCTIONAL ASSESSMENT: PAIN_FUNCTIONAL_ASSESSMENT: ACTIVITIES ARE NOT PREVENTED

## 2021-05-27 ASSESSMENT — PAIN DESCRIPTION - LOCATION: LOCATION: ABDOMEN;BACK

## 2021-05-27 ASSESSMENT — PAIN DESCRIPTION - FREQUENCY: FREQUENCY: CONTINUOUS

## 2021-05-27 ASSESSMENT — PAIN SCALES - GENERAL
PAINLEVEL_OUTOF10: 7
PAINLEVEL_OUTOF10: 5
PAINLEVEL_OUTOF10: 7
PAINLEVEL_OUTOF10: 8
PAINLEVEL_OUTOF10: 7
PAINLEVEL_OUTOF10: 7

## 2021-05-27 ASSESSMENT — COPD QUESTIONNAIRES: CAT_SEVERITY: MODERATE

## 2021-05-27 ASSESSMENT — PAIN DESCRIPTION - DESCRIPTORS: DESCRIPTORS: ACHING;DISCOMFORT

## 2021-05-27 ASSESSMENT — PAIN DESCRIPTION - PROGRESSION: CLINICAL_PROGRESSION: NOT CHANGED

## 2021-05-27 ASSESSMENT — PAIN DESCRIPTION - PAIN TYPE: TYPE: ACUTE PAIN;CHRONIC PAIN

## 2021-05-27 ASSESSMENT — PAIN DESCRIPTION - ONSET: ONSET: ON-GOING

## 2021-05-27 NOTE — ED NOTES
Bed: ED-34  Expected date:   Expected time:   Means of arrival:   Comments:  [address] Marie RN  05/27/21 9281

## 2021-05-27 NOTE — PROGRESS NOTES
wheezes, rales or rhonchi. Symmetric chest movement while on room air. CARDIO/VASC S1/S2 auscultated. Regular rate without appreciable murmurs. No peripheral edema. GI Abdomen is soft without significant tenderness, masses, or guarding. Bowel sounds are normoactive.  No costovertebral angle tenderness. Normal appearing external genitalia. Ryder catheter is not present. MSK No gross joint deformities. SKIN Normal coloration, warm, dry. NEURO  normal speech, no lateralizing weakness. PSYCH Awake, alert, oriented x 3.     Medications:   Medications:    levetiracetam  1,000 mg Intravenous Q12H    pantoprazole  40 mg Intravenous Daily    sodium chloride flush  5-40 mL Intravenous 2 times per day    enoxaparin  40 mg Subcutaneous Daily    ondansetron  4 mg Intravenous Q4H      Infusions:    sodium chloride      lactated ringers 100 mL/hr (05/27/21 0749)     PRN Meds: albuterol, 2.5 mg, Q6H PRN  sodium chloride flush, 5-40 mL, PRN  sodium chloride, 25 mL, PRN  polyethylene glycol, 17 g, Daily PRN  acetaminophen, 650 mg, Q6H PRN   Or  acetaminophen, 650 mg, Q6H PRN  ondansetron, 4 mg, Q8H PRN   Or  ondansetron, 4 mg, Q6H PRN  promethazine, 12.5 mg, Q6H PRN   Or  promethazine, 25 mg, Q6H PRN  morphine, 2 mg, Q3H PRN        CBC   Recent Labs     05/27/21  0041   WBC 4.7   HGB 11.4*   HCT 35.5*         BMP   Recent Labs     05/27/21  0041      K 4.1      CO2 23   BUN 23   CREATININE 0.9       Radiology report reviewed     Electronically signed by Max Geiger MD on 5/27/2021 at 2:27 PM

## 2021-05-27 NOTE — PROGRESS NOTES
Patient belongings assessed. Patient's home medications collected and placed in medication room per this nurse and Gaurang Kaiser.

## 2021-05-27 NOTE — CONSULTS
SURGICAL CONSULTATION    Date of Admission:  5/26/2021  Date of Consultation:  5/27/2021    PCP:  Bishop Reddy MD  Thank you Dr. Ronda Arreaga, DO very much for your consultation, it's always appreciated. I had the privilege today to see your patient Madai Castro. Chief Complaint / History of Present Illness:  Madai Castro is a very pleasant 44 y.o. female who presents with pain in the Epigastrium and is acute, worsening and dull compared to patient's normal condition. It is moderate in intensity for a duration of 2 weeks and is continuous with modifying factors increased by or worse with eating or drinking anything. .    Pt s/p Sleeve gasrtectomy, in need for RYGB for her morb obesity, Body mass index is 45.66 kg/m². , will follow. .  EGD tomorrow to r/o any gastric ulcers, abnormalities. Lencho Deal PMH:   has a past medical history of Acid reflux, Anemia, Anxiety, Arthritis, Bleeding ulcer (2014), Bronchitis (Last Episode 2014), Chronic back pain, Chronic pain, COPD (chronic obstructive pulmonary disease) ([address] Depression, Fibromyalgia (Dx 2013), GERD (gastroesophageal reflux disease), H/O echocardiogram (08/11/2015), H/O echocardiogram (08/30/2018), Hiatal hernia, History of blood transfusion (09/2015 And 2018), History of sleeve gastrectomy, [place] (hard of hearing), cardiac catheterization (10/24/2010), transesophageal echocardiography (PILO) for monitoring (12/02/2010), OTHER MEDICAL, OTHER MEDICAL, Hypertension, Kidney cysts, Lupus ([address] (Dx 2013), MDRO (multiple drug resistant organisms) resistance, Morbid obesity ([address] MRSA bacteremia, Nausea, Osteomyelitis of fifth toe of left foot ([address] Pain management, Pancreatitis chronic (Dx 2001), Pneumonia (Dx 11-15), Seizures ([address] Shortness of breath on exertion, Shortness of breath on exertion, Sleep apnea, Staph infection (Dx 11-15), Staph infection (Dx 11-15), Teeth missing, Thyroid disease, and Wears glasses.     PSH:   has a past surgical history that includes Lung removal, partial (Left, );  section (1991); Foot surgery (Left, Last Done In ); Dental surgery; Cholecystectomy, laparoscopic (8362'E); other surgical history (1998); other surgical history (Last Done 7-15-16); Tonsillectomy and adenoidectomy (); Appendectomy (1998); Upper gastrointestinal endoscopy (2018); Lap Band (~); Hysterectomy (10/1997); Endoscopy, colon, diagnostic (Last Done In ); Colonoscopy (Last Done In ); Cardiac catheterization (10/24/2010); Sleeve Gastrectomy (N/A, 2019); hiatal hernia repair (N/A, 2019); Upper gastrointestinal endoscopy (N/A, 2019); Foot Debridement (Left, 2019); Upper gastrointestinal endoscopy (N/A, 2019); Catheter Removal (N/A, 2019); Upper gastrointestinal endoscopy (N/A, 2019); INSERTION / REMOVAL / REPLACEMENT VENOUS ACCESS CATHETER (N/A, 8/15/2019); Upper gastrointestinal endoscopy (N/A, 10/14/2019); [address] Surgery (N/A, 1/15/2020); [address] Surgery (N/A, 2020); Upper gastrointestinal endoscopy (N/A, 2020); and Leg biopsy excision (Left, 3/8/2021). Allergies: Allergies   Allergen Reactions    Aspirin Palpitations     \"My Heart Rate Elevates\"    Shellfish-Derived Products Swelling    Toradol [Ketorolac Tromethamine] Rash    Compazine [Prochlorperazine]     Reglan [Metoclopramide] Itching        Home Meds:    Prior to Admission medications    Medication Sig Start Date End Date Taking?  Authorizing Provider   dicyclomine (BENTYL) 10 MG capsule Take 1 capsule by mouth 3 times daily as needed (abdominal pain) 21   George Marie MD   hydrOXYzine (VISTARIL) 50 MG capsule Take 1 capsule by mouth every 6 hours as needed for Anxiety 21   George Marie MD   levETIRAcetam (KEPPRA) 1000 MG tablet Take 1 tablet by mouth 2 times daily 21   George Marie MD   levothyroxine (SYNTHROID) 125 MCG tablet Take 1 tablet by mouth Daily 21   George Marie MD atenolol (TENORMIN) 25 MG tablet Take 1 tablet by mouth daily 5/13/21   Clarisse Carreon MD   cloNIDine (CATAPRES) 0.1 MG tablet Take 1 tablet by mouth 2 times daily 5/13/21   Clarisse Carreon MD   estradiol (ESTRACE) 0.5 MG tablet Take 1 tablet by mouth daily 5/13/21   Clarisse Carreon MD   PARoxetine (PAXIL) 30 MG tablet Take 1.5 tablets by mouth nightly 5/13/21   Clarisse Carreon MD   pantoprazole (PROTONIX) 40 MG tablet Take 1 tablet by mouth daily (with breakfast) 5/11/21   Vicente Saldivar MD   albuterol (PROVENTIL) (2.5 MG/3ML) 0.083% nebulizer solution Take 3 mLs by nebulization every 6 hours as needed for Wheezing 4/7/21   MICHAEL Moser CNP   betamethasone valerate (VALISONE) 0.1 % ointment APPLY OINTMENT TO AFFECTED AREA TWICE DAILY TO HANDS AND ELBOWS FOR 21 DAYS 3/26/21   Historical Provider, MD   EQ PINK-BISMUTH 262 MG chewable tablet CHEW & SWALLOW 2 TABLETS BY MOUTH 4 TIMES DAILY BEFORE MEAL(S) AND NIGHTLY FOR 5 DAYS 3/26/21   Historical Provider, MD   mupirocin (BACTROBAN) 2 % ointment APPLY A SMALL AMOUNT TO THE AFFECTED AREA TWICE DAILY FOR 5 TO 10 DAYS 3/27/21   Historical Provider, MD   ondansetron (ZOFRAN-ODT) 4 MG disintegrating tablet DISSOLVE 1 TABLET IN MOUTH EVERY 8 HOURS AS NEEDED FOR NAUSEA OR VOMITING 3/26/21   Historical Provider, MD   scopolamine (TRANSDERM-SCOP) transdermal patch APPLY 1 PATCH TRANSDERMALLY TO THE SKIN BEHIND THE EAR ONCE EVERY 3 DAYS AS NEEDED 1/4/21   Historical Provider, MD   potassium gluconate 550 mg tablet Take 1 tablet by mouth daily 3/25/21   Historical Provider, MD   tiZANidine (ZANAFLEX) 4 MG tablet TAKE 1 TABLET BY MOUTH THREE TIMES DAILY AS NEEDED 3/3/21   Historical Provider, MD   albuterol sulfate  (90 Base) MCG/ACT inhaler Inhale 2 puffs into the lungs 4 times daily 2/8/21   Sb Alvarenga MD   ferrous sulfate (IRON 325) 325 (65 Fe) MG tablet Take 1 tablet by mouth daily (with breakfast) 10/23/20   Andrea Hunt MD oxyCODONE-acetaminophen (PERCOCET) 5-325 MG per tablet Take 1 tablet by mouth.  Every 4-6 hours PRN    Historical Provider, MD   Cholecalciferol (VITAMIN D3) 5000 units TABS Take 1 tablet by mouth daily    Historical Provider, MD   Multiple Vitamin (MVI, BARIATRIC ADVANTAGE MULTI-FORMULA, CHEW TAB) Take 1 tablet by mouth daily 2/22/19   Bertha Porter MD   Calcium Citrate-Vitamin D (CALCIUM + VIT D, BARIATRIC ADVANTAGE, CHEWABLE TABLET) Take 1 tablet by mouth 2 times daily 2/22/19   Bertha Porter MD   gabapentin (NEURONTIN) 800 MG tablet Take 800 mg by mouth 3 times daily    Historical Provider, MD        Hospital Meds:    Current Facility-Administered Medications   Medication Dose Route Frequency Provider Last Rate Last Admin    albuterol (PROVENTIL) nebulizer solution 2.5 mg  2.5 mg Nebulization Q6H PRN Eugune Cascade, DO        levETIRAcetam (KEPPRA) 1,000 mg in sodium chloride 0.9 % 100 mL IVPB  1,000 mg Intravenous Q12H Hari Jennings, DO        pantoprazole (PROTONIX) injection 40 mg  40 mg Intravenous Daily Eugune Cascade, DO        sodium chloride flush 0.9 % injection 5-40 mL  5-40 mL Intravenous 2 times per day Eugune Lolly, DO        sodium chloride flush 0.9 % injection 5-40 mL  5-40 mL Intravenous PRN Hari Manuele, DO        0.9 % sodium chloride infusion  25 mL Intravenous PRN Eugune Lolly, DO        enoxaparin (LOVENOX) injection 40 mg  40 mg Subcutaneous Daily Hari Moele, DO        polyethylene glycol (GLYCOLAX) packet 17 g  17 g Oral Daily PRN Eugune Lolly, DO        acetaminophen (TYLENOL) tablet 650 mg  650 mg Oral Q6H PRN Eugune Cascade, DO        Or    acetaminophen (TYLENOL) suppository 650 mg  650 mg Rectal Q6H PRN Eugune Lolly, DO        lactated ringers infusion   Intravenous Continuous Hari Manucarmelita, DO        ondansetron (ZOFRAN-ODT) disintegrating tablet 4 mg  4 mg Oral Q8H PRN Eugune Lolly, DO        Or    ondansetron (ZOFRAN) injection 4 mg  4 mg Intravenous Q6H PRN [place] Lace, DO        morphine sulfate (PF) injection 2 mg  2 mg Intravenous Q4H PRN [place] Lace, DO   2 mg at 05/27/21 0430    promethazine (PHENERGAN) injection 25 mg  25 mg Intramuscular Q6H PRN [place] Lace, DO         Current Outpatient Medications   Medication Sig Dispense Refill    dicyclomine (BENTYL) 10 MG capsule Take 1 capsule by mouth 3 times daily as needed (abdominal pain) 21 capsule 3    hydrOXYzine (VISTARIL) 50 MG capsule Take 1 capsule by mouth every 6 hours as needed for Anxiety 30 capsule 3    levETIRAcetam (KEPPRA) 1000 MG tablet Take 1 tablet by mouth 2 times daily 60 tablet 3    levothyroxine (SYNTHROID) 125 MCG tablet Take 1 tablet by mouth Daily 30 tablet 3    atenolol (TENORMIN) 25 MG tablet Take 1 tablet by mouth daily 30 tablet 3    cloNIDine (CATAPRES) 0.1 MG tablet Take 1 tablet by mouth 2 times daily 60 tablet 3    estradiol (ESTRACE) 0.5 MG tablet Take 1 tablet by mouth daily 21 tablet 3    PARoxetine (PAXIL) 30 MG tablet Take 1.5 tablets by mouth nightly 30 tablet 3    pantoprazole (PROTONIX) 40 MG tablet Take 1 tablet by mouth daily (with breakfast) 30 tablet 3    albuterol (PROVENTIL) (2.5 MG/3ML) 0.083% nebulizer solution Take 3 mLs by nebulization every 6 hours as needed for Wheezing 120 each 0    betamethasone valerate (VALISONE) 0.1 % ointment APPLY OINTMENT TO AFFECTED AREA TWICE DAILY TO HANDS AND ELBOWS FOR 21 DAYS      EQ PINK-BISMUTH 262 MG chewable tablet CHEW & SWALLOW 2 TABLETS BY MOUTH 4 TIMES DAILY BEFORE MEAL(S) AND NIGHTLY FOR 5 DAYS      mupirocin (BACTROBAN) 2 % ointment APPLY A SMALL AMOUNT TO THE AFFECTED AREA TWICE DAILY FOR 5 TO 10 DAYS      ondansetron (ZOFRAN-ODT) 4 MG disintegrating tablet DISSOLVE 1 TABLET IN MOUTH EVERY 8 HOURS AS NEEDED FOR NAUSEA OR VOMITING      scopolamine (TRANSDERM-SCOP) transdermal patch APPLY 1 PATCH TRANSDERMALLY TO THE SKIN BEHIND THE EAR ONCE EVERY 3 DAYS AS NEEDED      potassium gluconate 550 mg tablet Take 1 tablet by mouth daily      tiZANidine (ZANAFLEX) 4 MG tablet TAKE 1 TABLET BY MOUTH THREE TIMES DAILY AS NEEDED      albuterol sulfate  (90 Base) MCG/ACT inhaler Inhale 2 puffs into the lungs 4 times daily 1 Inhaler 5    ferrous sulfate (IRON 325) 325 (65 Fe) MG tablet Take 1 tablet by mouth daily (with breakfast) 90 tablet 5    oxyCODONE-acetaminophen (PERCOCET) 5-325 MG per tablet Take 1 tablet by mouth. Every 4-6 hours PRN      Cholecalciferol (VITAMIN D3) 5000 units TABS Take 1 tablet by mouth daily      Multiple Vitamin (MVI, BARIATRIC ADVANTAGE MULTI-FORMULA, CHEW TAB) Take 1 tablet by mouth daily 30 tablet 5    Calcium Citrate-Vitamin D (CALCIUM + VIT D, BARIATRIC ADVANTAGE, CHEWABLE TABLET) Take 1 tablet by mouth 2 times daily 120 tablet 5    gabapentin (NEURONTIN) 800 MG tablet Take 800 mg by mouth 3 times daily        sodium chloride      lactated ringers         Social History / Family History:     Social History     Socioeconomic History    Marital status:      Spouse name: None    Number of children: None    Years of education: None    Highest education level: None   Occupational History    None   Tobacco Use    Smoking status: Never Smoker    Smokeless tobacco: Never Used   Vaping Use    Vaping Use: Never used   Substance and Sexual Activity    Alcohol use: No     Alcohol/week: 0.0 standard drinks    Drug use: No    Sexual activity: Not Currently   Other Topics Concern    None   Social History Narrative    None     Social Determinants of Health     Financial Resource Strain:     Difficulty of Paying Living Expenses:    Food Insecurity:     Worried About Running Out of Food in the Last Year:     Ran Out of Food in the Last Year:    Transportation Needs:     Lack of Transportation (Medical):      Lack of Transportation (Non-Medical):    Physical Activity:     Days of Exercise per Week:     Minutes of Exercise per Session: Stress:     Feeling of Stress :    Social Connections:     Frequency of Communication with Friends and Family:     Frequency of Social Gatherings with Friends and Family:     Attends Hinduism Services:     Active Member of Clubs or Organizations:     Attends Club or Organization Meetings:     Marital Status:    Intimate Partner Violence:     Fear of Current or Ex-Partner:     Emotionally Abused:     Physically Abused:     Sexually Abused:       Family History   Problem Relation Age of Onset    Diabetes Mother         \"Borderline Diabetes\"    High Blood Pressure Mother     Obesity Mother     Arthritis Mother     Heart Disease Mother     High Cholesterol Mother     Vision Loss Mother     Diabetes Father     High Blood Pressure Father     Asthma Father     Cancer Father         prostate cancer    High Blood Pressure Brother     Asthma Son     Vision Loss Son     Lupus Daughter     Other Daughter         \"Alot Of Female Problems\"    otherwise irrelevant to this surgical issue. Review of Systems:  Constitutional: Negative for fever, chills, diaphoresis, appetite change and fatigue. HENT: Negative for sore throat, trouble swallowing and voice change. Respiratory: Negative for cough, positive for shortness of breath no wheezing. Cardiovascular: Negative for chest pain positive for SOB with one flight of stairs exertion, no pitting LE edema. Gastrointestinal: Positive for abdominal pain, nausea, vomiting, no abdominal distention, constipation, no diarrhea, blood in stool, anal bleeding or rectal pain. Musculoskeletal: Negative for joint swelling and arthralgias. Skin: Warm and dry, well perfused. Neurological: Negative for seizures, syncope, speech difficulty and weakness. Hematological/Lymphatic: Negative for adenopathy. No history of DVT/PE. Does not bruise/bleed easily. Psychiatric/Behavioral: Negative for agitation. All others reviewed and negative.       Physical Exam:  Vital Signs:   Vitals:    05/27/21 0232   BP: 125/78   Pulse: 72   Resp: 21   Temp:    SpO2: 100%     Body mass index is 45.66 kg/m². General appearance: Pt Alert and oriented, in no apparent acute distress. HEENT:  KATE, Conjunctiva clear. EOMI, No JVDs, Bruits, Megalies: neither thyroid nor lymph nodes. Lungs: Clear to auscultation bilaterally. Heart: Regular rate and rhythm, S1, S2 normal, no murmur, rub or gallop. Abdomen: RUQ tender. Non distended. Positive bowel sounds. No hernias noted, no masses palpable. Extremities: Warm, well perfused, no cyanosis or edema. Skin: Skin color, texture, turgor normal.  Neurologic: Grossly normal, Cranial nerves from II to XII intact, Deep tendon reflexes normal.  Lymph nodes: No palpable LNs, Cervical, groins, abdominal.  Musculoskeletal: Bilateral Upper and lower extremities ROM within normal limits. Radiologic / Imaging / TESTING  XR CHEST (2 VW)    Result Date: 5/8/2021  EXAMINATION: TWO XRAY VIEWS OF THE CHEST 5/8/2021 10:30 pm COMPARISON: 03/29/2021 HISTORY: ORDERING SYSTEM PROVIDED HISTORY: epigastric abdominal pain TECHNOLOGIST PROVIDED HISTORY: Reason for exam:->epigastric abdominal pain Reason for Exam: epigastric abdominal pain Acuity: Acute Type of Exam: Initial Mechanism of Injury: epigastric abdominal pain Relevant Medical/Surgical History: epigastric abdominal pain FINDINGS: The lungs are clear. The cardiac and mediastinal contours are normal.  There is no pleural effusion or pneumothorax. No acute osseous abnormality is identified. Left chest port tip is in the SVC, unchanged. Cholecystectomy clips are present. No acute cardiopulmonary abnormality.      XR ANKLE LEFT (MIN 3 VIEWS)    Result Date: 5/1/2021  EXAMINATION: THREE XRAY VIEWS OF THE LEFT ANKLE; TWO XRAY VIEWS OF THE LEFT FOOT 5/1/2021 1:57 pm COMPARISON: March 3, 2021 HISTORY: ORDERING SYSTEM PROVIDED HISTORY: fall, pain, bruising medially TECHNOLOGIST PROVIDED HISTORY: Reason for exam:->fall, pain, bruising medially Reason for Exam: fall, pain, bruising medially Acuity: Acute Type of Exam: Initial Mechanism of Injury: fall Relevant Medical/Surgical History: none Acute traumatic pain of the left ankle and the left foot. FINDINGS: Left ankle: No acute fracture or dislocation. No evidence of osteochondral lesion. Joint alignment and joint spaces are maintained. No erosions are present. There is moderate calcaneal enthesopathy at the insertion of the plantar aponeurosis, with mild enthesopathy at the Achilles tendon insertion. Left foot: There is chronic deformity/flattening of the 3rd metatarsal head consistent with remote subchondral fracture or AVN. 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 Mild to moderate osteoarthritis of the 3rd MTP joint. Joint alignment is maintained. No erosions are present. 1. No acute osseous abnormality of the left ankle or the left foot. 2. Calcaneal enthesopathy. 3. Chronic deformity/flattening of the 3rd metatarsal head suggestive of remote subchondral fracture or remote AVN. Mild-to-moderate osteoarthritis of the 3rd MTP joint. 4. 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 XR FOOT LEFT (2 VIEWS)    Result Date: 5/1/2021  EXAMINATION: THREE XRAY VIEWS OF THE LEFT ANKLE; TWO XRAY VIEWS OF THE LEFT FOOT 5/1/2021 1:57 pm COMPARISON: March 3, 2021 HISTORY: ORDERING SYSTEM PROVIDED HISTORY: fall, pain, bruising medially TECHNOLOGIST PROVIDED HISTORY: Reason for exam:->fall, pain, bruising medially Reason for Exam: fall, pain, bruising medially Acuity: Acute Type of Exam: Initial Mechanism of Injury: fall Relevant Medical/Surgical History: none Acute traumatic pain of the left ankle and the left foot. FINDINGS: Left ankle: No acute fracture or dislocation. No evidence of osteochondral lesion. Joint alignment and joint spaces are maintained. No erosions are present.  There is moderate calcaneal enthesopathy at the insertion of the plantar aponeurosis, with mild enthesopathy at the Achilles tendon insertion. Left foot: There is chronic deformity/flattening of the 3rd metatarsal head consistent with remote subchondral fracture or AVN. 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 Mild to moderate osteoarthritis of the 3rd MTP joint. Joint alignment is maintained. No erosions are present. 1. No acute osseous abnormality of the left ankle or the left foot. 2. Calcaneal enthesopathy. 3. Chronic deformity/flattening of the 3rd metatarsal head suggestive of remote subchondral fracture or remote AVN. Mild-to-moderate osteoarthritis of the 3rd MTP joint. 4. 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 CT ABDOMEN PELVIS W IV CONTRAST Additional Contrast? None    Result Date: 5/10/2021  EXAMINATION: CT OF THE ABDOMEN AND PELVIS WITH CONTRAST 5/9/2021 1:32 am TECHNIQUE: CT of the abdomen and pelvis was performed with the administration of intravenous contrast. Multiplanar reformatted images are provided for review. Dose modulation, iterative reconstruction, and/or weight based adjustment of the mA/kV was utilized to reduce the radiation dose to as low as reasonably achievable. COMPARISON: May 1, 2021 HISTORY: ORDERING SYSTEM PROVIDED HISTORY: epigastric abdominal pain TECHNOLOGIST PROVIDED HISTORY: Reason for exam:->epigastric abdominal pain Reason for exam:->49 yo female with hx of gastric sleeve and chronic pancreatitis presents with severe epigastric abdominal pain and intractable nausea and vomiting. Additional Contrast?->None Decision Support Exception - unselect if not a suspected or confirmed emergency medical condition->Emergency Medical Condition (MA) Reason for Exam: epigastric abdominal pain Acuity: Acute Type of Exam: Initial FINDINGS: Lower Chest: No focal consolidation or pleural effusion. Organs: Status post cholecystectomy. No intra or extrahepatic biliary dilatation. Pneumobilia is present. No choledocholithiasis.  The liver parenchyma, spleen, pancreas, adrenal glands, and kidneys demonstrate no acute abnormality. 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 Bilateral ureters are normal in course and caliber without ureteral calculi. GI/Bowel: The stomach demonstrates postoperative changes compatible with sleeve gastrectomy. The stomach is otherwise unremarkable. The small and large bowel are normal in course and caliber without evidence of wall thickening or obstruction. Pelvis: Normal bladder. Status post hysterectomy. No adnexal masses. Peritoneum/Retroperitoneum: No free fluid or free air. No pathologic lymphadenopathy. Aorta and its branches are patent and normal in course and caliber. No significant atherosclerosis. Bones/Soft Tissues: No acute or aggressive osseous lesion. Degenerative changes at L5-S1 are seen. 1. No acute intra-abdominal abnormality. 2. Cholecystectomy, hysterectomy, and sleeve gastrectomy. 3. 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 CT ABDOMEN PELVIS W IV CONTRAST Additional Contrast? None    Result Date: 5/3/2021  EXAMINATION: CT OF THE ABDOMEN AND PELVIS WITH CONTRAST 5/1/2021 6:35 pm TECHNIQUE: CT of the abdomen and pelvis was performed with the administration of intravenous contrast. Multiplanar reformatted images are provided for review. Dose modulation, iterative reconstruction, and/or weight based adjustment of the mA/kV was utilized to reduce the radiation dose to as low as reasonably achievable.  COMPARISON: CT abdomen/pelvis performed 03/29/2021 HISTORY: ORDERING SYSTEM PROVIDED HISTORY: Right upper abdominal pain, nausea vomiting, diarrhea, recent colitis diagnosis TECHNOLOGIST PROVIDED HISTORY: Reason for exam:->Right upper abdominal pain, nausea vomiting, diarrhea, recent colitis diagnosis Additional Contrast?->None Reason for Exam: Right upper abdominal pain, nausea vomiting, diarrhea, recent colitis diagnosis Acuity: Acute Type of Exam: Initial FINDINGS: Lower Chest: No acute abnormality of the visualized lower List   Diagnosis    Fibromyalgia    Lupus (systemic lupus erythematosus) (HCC)    Chronic pancreatitis (HCC)    HTN (hypertension)    Generalized abdominal pain    Frequent UTI    Gastroesophageal reflux disease without esophagitis    Chronic depression    Fatty liver disease, nonalcoholic    Arthritis    Bilateral low back pain with left-sided sciatica    Intractable vomiting with nausea    Hiatal hernia    Status post laparoscopic sleeve gastrectomy    Pseudoseizure    Chronic pain syndrome    Drug-seeking/Aberrant behavior    Lymph node disorder    Morbid obesity with BMI of 40.0-44.9, adult ([facility]    Iron deficiency anemia    Encounter for weight management    Intractable nausea and vomiting    Seizure disorder (HCC)    Abdominal pain    Anxiety    RUQ pain    Intractable vomiting    Status post bariatric surgery    Morbid obesity with BMI of 45.0-49.9, adult ([facility])    Discoloration of skin of flank resembling ecchymosis    Morbid obesity with BMI of 50.0-59.9, adult ([facility]    Chest pain of uncertain etiology    Cellulitis of left thigh    Malabsorption    COPD (chronic obstructive pulmonary disease) ([facility])    Nausea and vomiting    Hypothyroidism due to acquired atrophy of thyroid    Osteopenia    Vitamin D deficiency    Irritable bowel syndrome       Assessment & plan:  Az Salmon is a very pleasant 44 y.o. female presenting with a h/o Sleeve gasrtectomy, in need for RYGB for her morb obesity, Body mass index is 45.66 kg/m². , will follow. .  Nausea / Vomiting and RUQ abdominal pain. .   EGD tomorrow to r/o any gastric ulcers, abnormalities. .. Continue NPO/Bowel rest, IV Fluids, Pain control, Nausea control.     Thank you doctor [facility], DO very much for your consultation and for the opportunity to take care of Mrs Az Salmon with you, I'll follow along with you and I'll update you on any new events in her care.    ____________________________________________    Lucila Hagen MD, FACS, [facility]    5/27/2021  7:33 AM      Patient was seen with total face to face time of 45 minutes. More than 50% of this visit was counseling and education as above in my assessment and plan section of my note.

## 2021-05-27 NOTE — ANESTHESIA PRE PROCEDURE
facility-administered medications for this visit.      Current Outpatient Medications   Medication Sig Dispense Refill    Melatonin 10 MG TABS Take 10-20 mg by mouth nightly as needed      Cyanocobalamin (VITAMIN B 12 PO) Take 1 tablet by mouth daily      dicyclomine (BENTYL) 10 MG capsule Take 1 capsule by mouth 3 times daily as needed (abdominal pain) 21 capsule 3    hydrOXYzine (VISTARIL) 50 MG capsule Take 1 capsule by mouth every 6 hours as needed for Anxiety 30 capsule 3    levETIRAcetam (KEPPRA) 1000 MG tablet Take 1 tablet by mouth 2 times daily 60 tablet 3    levothyroxine (SYNTHROID) 125 MCG tablet Take 1 tablet by mouth Daily 30 tablet 3    atenolol (TENORMIN) 25 MG tablet Take 1 tablet by mouth daily 30 tablet 3    cloNIDine (CATAPRES) 0.1 MG tablet Take 1 tablet by mouth 2 times daily (Patient taking differently: Take 0.1 mg by mouth 2 times daily as needed 05/27/21 Patient states she only takes as needed) 60 tablet 3    estradiol (ESTRACE) 0.5 MG tablet Take 1 tablet by mouth daily (Patient taking differently: Take 0.5 mg by mouth nightly ) 21 tablet 3    PARoxetine (PAXIL) 30 MG tablet Take 1.5 tablets by mouth nightly (Patient taking differently: Take 30 mg by mouth nightly ) 30 tablet 3    pantoprazole (PROTONIX) 40 MG tablet Take 1 tablet by mouth daily (with breakfast) 30 tablet 3    albuterol (PROVENTIL) (2.5 MG/3ML) 0.083% nebulizer solution Take 3 mLs by nebulization every 6 hours as needed for Wheezing 120 each 0    betamethasone valerate (VALISONE) 0.1 % ointment APPLY OINTMENT TO AFFECTED AREA TWICE DAILY TO HANDS AND ELBOWS FOR 21 DAYS      EQ PINK-BISMUTH 262 MG chewable tablet CHEW & SWALLOW 2 TABLETS BY MOUTH 4 TIMES DAILY BEFORE MEAL(S) AND NIGHTLY FOR 5 DAYS      mupirocin (BACTROBAN) 2 % ointment APPLY A SMALL AMOUNT TO THE AFFECTED AREA TWICE DAILY FOR 5 TO 10 DAYS      ondansetron (ZOFRAN-ODT) 4 MG disintegrating tablet DISSOLVE 1 TABLET IN MOUTH EVERY 8 HOURS AS NEEDED FOR NAUSEA OR VOMITING      scopolamine (TRANSDERM-SCOP) transdermal patch APPLY 1 PATCH TRANSDERMALLY TO THE SKIN BEHIND THE EAR ONCE EVERY 3 DAYS AS NEEDED      potassium gluconate 550 mg tablet Take 1 tablet by mouth daily      tiZANidine (ZANAFLEX) 4 MG tablet Take 4 mg by mouth every 8 hours as needed       albuterol sulfate  (90 Base) MCG/ACT inhaler Inhale 2 puffs into the lungs 4 times daily 1 Inhaler 5    ferrous sulfate (IRON 325) 325 (65 Fe) MG tablet Take 1 tablet by mouth daily (with breakfast) 90 tablet 5    oxyCODONE-acetaminophen (PERCOCET) 5-325 MG per tablet Take 1 tablet by mouth.  Every 4-6 hours PRN      Cholecalciferol (VITAMIN D3) 5000 units TABS Take 1 tablet by mouth daily      Multiple Vitamin (MVI, BARIATRIC ADVANTAGE MULTI-FORMULA, CHEW TAB) Take 1 tablet by mouth daily 30 tablet 5    Calcium Citrate-Vitamin D (CALCIUM + VIT D, BARIATRIC ADVANTAGE, CHEWABLE TABLET) Take 1 tablet by mouth 2 times daily 120 tablet 5    gabapentin (NEURONTIN) 800 MG tablet Take 800 mg by mouth 3 times daily       Facility-Administered Medications Ordered in Other Visits   Medication Dose Route Frequency Provider Last Rate Last Admin    albuterol (PROVENTIL) nebulizer solution 2.5 mg  2.5 mg Nebulization Q6H PRN Keysha Police, DO        levETIRAcetam (KEPPRA) 1,000 mg in sodium chloride 0.9 % 100 mL IVPB  1,000 mg Intravenous Q12H Keysha Police, DO   Stopped at 05/27/21 0847    pantoprazole (PROTONIX) injection 40 mg  40 mg Intravenous Daily Keysha Police, DO   40 mg at 05/27/21 0907    sodium chloride flush 0.9 % injection 5-40 mL  5-40 mL Intravenous 2 times per day Keysha Police, DO        sodium chloride flush 0.9 % injection 5-40 mL  5-40 mL Intravenous PRN Keysha Police, DO        0.9 % sodium chloride infusion  25 mL Intravenous PRN [place] Police, DO        enoxaparin (LOVENOX) injection 40 mg  40 mg Subcutaneous Daily [place] Police, DO   40 mg at 05/27/21 0905    polyethylene glycol (GLYCOLAX) packet 17 g  17 g Oral Daily PRN Rochelle Pulling, DO        acetaminophen (TYLENOL) tablet 650 mg  650 mg Oral Q6H PRN Rochelle Pulling, DO        Or    acetaminophen (TYLENOL) suppository 650 mg  650 mg Rectal Q6H PRN Rochelle Pulling, DO        lactated ringers infusion   Intravenous Continuous Rochelle Pulling,  mL/hr at 05/27/21 0749 100 mL/hr at 05/27/21 0749    ondansetron (ZOFRAN-ODT) disintegrating tablet 4 mg  4 mg Oral Q8H PRN Rochelle Pulling, DO        Or    ondansetron [facility]) injection 4 mg  4 mg Intravenous Q6H PRN Rochelle Pulling, DO   4 mg at 05/27/21 0901    morphine sulfate (PF) injection 2 mg  2 mg Intravenous Q4H PRN Rochelle Pulling, DO   2 mg at 05/27/21 0903    ondansetron (ZOFRAN) injection 4 mg  4 mg Intravenous Q4H Lisbeth Evangelista MD        promethazine (PHENERGAN) injection 12.5 mg  12.5 mg Intravenous Q6H PRN Dontrell Yadav MD        Or    promethazine (PHENERGAN) injection 25 mg  25 mg Intramuscular Q6H PRN Dontrell Yadav MD           Allergies:     Allergies   Allergen Reactions    Aspirin Palpitations     \"My Heart Rate Elevates\"    Shellfish-Derived Products Swelling    Toradol [Ketorolac Tromethamine] Rash    Compazine [Prochlorperazine]     Reglan [Metoclopramide] Itching       Problem List:    Patient Active Problem List   Diagnosis Code    Fibromyalgia M79.7    Lupus (systemic lupus erythematosus) (HCC) M32.9    Chronic pancreatitis (HCC) K86.1    HTN (hypertension) I10    Generalized abdominal pain R10.84    Frequent UTI N39.0    Gastroesophageal reflux disease without esophagitis K21.9    Chronic depression F32.9    Fatty liver disease, nonalcoholic T55.2    Arthritis M19.90    Bilateral low back pain with left-sided sciatica M54.42    Intractable vomiting with nausea R11.2    Hiatal hernia K44.9    Status post laparoscopic sleeve gastrectomy Z98.84    Pseudoseizure F44.5    Chronic pain syndrome G89.4    Drug-seeking/Aberrant behavior Z76.5    Lymph node disorder I89.9    Morbid obesity with BMI of 40.0-44.9, adult ([facility]) E66.01, Z68.41    Iron deficiency anemia D50.9    Encounter for weight management Z76.89    Intractable nausea and vomiting R11.2    Seizure disorder ([facility]) G40.909    Abdominal pain R10.9    Anxiety F41.9    RUQ pain R10.11    Intractable vomiting R11.10    Status post bariatric surgery Z98.84    Morbid obesity with BMI of 45.0-49.9, adult ([facility]) E66.01, Z68.42    Discoloration of skin of flank resembling ecchymosis L81.9    Morbid obesity with BMI of 50.0-59.9, adult ([facility]) E66.01, Z68.43    Chest pain of uncertain etiology A69.7    Cellulitis of left thigh L03. 116    Malabsorption K90.9    COPD (chronic obstructive pulmonary disease) ([facility]) J44.9    Nausea and vomiting R11.2    Hypothyroidism due to acquired atrophy of thyroid E03.4    Osteopenia M85.80    Vitamin D deficiency E55.9    Irritable bowel syndrome K58.9       Past Medical History:        Diagnosis Date    Acid reflux     Anemia     Anxiety     Arthritis     Hands, Back And Ankles    Bleeding ulcer 2014    \"I Had Ulcers In My Stomach And Colon\"/ per pt on 8/12/2019\"they said recently having some blood in my stomach- in July ( 2019)could not find where coming from \"    Bronchitis Last Episode 2014    Chronic back pain     [facility]     [facility]    COPD (chronic obstructive pulmonary disease) [facility] [address]     Sees Dr. Chavira  Depression     Fibromyalgia Dx 2013    GERD (gastroesophageal reflux disease)     H/O echocardiogram 08/11/2015    EF >55%. LA to be at the upper limit of normal in size. LV hypertrophy with normal LV systolic, but abnormal diastolic function. Normal valvular structures and function.      H/O echocardiogram 08/30/2018    EF 55-60%    Hiatal hernia     History of blood transfusion 09/2015 And 2018    No Reaction To Blood Transfusions Received   Marlys Velásquez History of sleeve gastrectomy     [place] (hard of hearing)     Right Ear    Hx of cardiac catheterization 10/24/2010    Angiographically normal coronary arteries w/ normal LV function and wall motion.  Hx of transesophageal echocardiography (PILO) for monitoring 12/02/2010    EF 55-60%. WNL.    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 name   Markus Hall cysts     \"they found that I have a kidney cyst but not sure which side\" per pt on 7/15/2020    Lupus ([address] Dx 2013    \"Rheumatoid Lupus\"    MDRO (multiple drug resistant organisms) resistance     4 months ago chest from mediport    Morbid obesity ([address]     MRSA bacteremia     Nausea     \"Most Of The Time\"    Osteomyelitis of fifth toe of left foot ([address]     Pain management     Sees Dr. Pina Page, Once A Month    Pancreatitis chronic Dx 2001    Pneumonia Dx 11-15    Seizures ([address]     Shortness of breath on exertion     Shortness of breath on exertion     Sleep apnea     Has CPAP\"no longer use the cpap since lost weight\"    Staph infection Dx 11-15    Left Foot    Staph infection Dx 11-15    \"Left Foot\"    Teeth missing     Upper And Lower    Thyroid disease     Wears glasses     To Read       Past Surgical History:        Procedure Laterality Date    APPENDECTOMY  02/1998    Done When Tubes And Ovaries Were Removed    CARDIAC CATHETERIZATION  10/24/2010    CATHETER REMOVAL N/A 7/16/2019    PORT REMOVAL performed by Kirstin Mei MD at [address] [facility]  08/27/1991    CHOLECYSTECTOMY, LAPAROSCOPIC  1990's    COLONOSCOPY  Last Done In 2000's    DENTAL SURGERY      Teeth Extracted In Past    ENDOSCOPY, COLON, DIAGNOSTIC  Last Done In 2018    FOOT DEBRIDEMENT Left 6/16/2019    FIRST METATARSAL DEBRIDEMENT INCISION AND DRAINAGE. EXCISION OF ULCER.  RESECTION OF BONE. 1ST METATARSAL POWER LAVAGE AND BONE CEMENT performed by Bill Aleman DPM at [address] [facility] Left Last Done In 2016     Dr. Anne Bah, \" About 6 Surgeries Done Because Of Staph Infection\"    HIATAL HERNIA REPAIR N/A 2/12/2019    HERNIA HIATAL LAPAROSCOPIC ROBOTIC performed by Rayna Mcdonough MD at [address] [place] [address]  10/1997    Partial Abdominal Hysterectomy    INSERTION / REMOVAL / REPLACEMENT VENOUS ACCESS CATHETER N/A 8/15/2019    PORT INSERTION performed by Rayna Mcdonough MD at [address] [facility] [place]    removed after 6 months    LEG BIOPSY EXCISION Left 3/8/2021    LEFT THIGH LESION BIOPSY EXCISION performed by Rayna Mcdonough MD at [facility], PARTIAL Left 2008    Benign    OTHER SURGICAL HISTORY  02/1998    \"Tubes And Ovaries Removed, Appendectomy Also Done\"    OTHER SURGICAL HISTORY  Last Done 7-15-16    Mediport Insertion \"Total Of Six Done, Removed Last Mediport In 2014\"    PORT SURGERY N/A 1/15/2020    PORT REMOVAL performed by Rayna Mcdonough MD at [address] [facility] N/A 7/6/2020    PORT INSERTION performed by Rayna Mcdonough MD at [address] [place] [address] N/A 2/12/2019    GASTRECTOMY SLEEVE LAPAROSCOPIC ROBOTIC performed by Rayna Mcdonough MD at [address] [place] [address]  08/27/2018    UPPER GASTROINTESTINAL ENDOSCOPY N/A 4/2/2019    EGD CONTROL HEMORRHAGE with epi injection at bleeding site performed by Rayna Mcdonough MD at [place] [address] 6/19/2019    EGD DIAGNOSTIC ONLY performed by Rayna Mcdonough MD at [facility] N/A 7/22/2019    EGD DIAGNOSTIC ONLY performed by Hilda Robles MD at [place] [address] 10/14/2019    EGD DIAGNOSTIC ONLY performed by Felice Jacob MD at [facility] N/A 7/17/2020    EGJ DILATATION BALLOON WITH 18-20 MM BALLOON, DILATED TO 20 MM. performed by Felice Jacob MD at [facility] ENDOSCOPY       Social History:    Social History     Tobacco Use    Smoking status: Never Smoker    Smokeless tobacco: Never Used   Substance Use Topics    Alcohol use: No     Alcohol/week: 0.0 standard drinks                                Counseling given: Not Answered      Vital Signs (Current): There were no vitals filed for this visit. BP Readings from Last 3 Encounters:   05/27/21 126/68   05/12/21 132/86   05/11/21 130/70       NPO Status:                                                                                 BMI:   Wt Readings from Last 3 Encounters:   05/26/21 266 lb (120.7 kg)   05/12/21 272 lb (123.4 kg)   05/11/21 282 lb 6.4 oz (128.1 kg)     There is no height or weight on file to calculate BMI.    CBC:   Lab Results   Component Value Date    WBC 4.7 05/27/2021    RBC 4.37 05/27/2021    HGB 11.4 05/27/2021    HCT 35.5 05/27/2021    MCV 81.2 05/27/2021    RDW 15.1 05/27/2021     05/27/2021       CMP:   Lab Results   Component Value Date     05/27/2021    K 4.1 05/27/2021     05/27/2021    CO2 23 05/27/2021    BUN 23 05/27/2021    CREATININE 0.9 05/27/2021    GFRAA >60 05/27/2021    LABGLOM >60 05/27/2021    GLUCOSE 96 05/27/2021    PROT 7.3 05/27/2021    PROT 6.5 11/18/2011    CALCIUM 10.7 05/27/2021    BILITOT 0.2 05/27/2021    ALKPHOS 93 05/27/2021    AST 17 05/27/2021    ALT 16 05/27/2021       POC Tests:   No results for input(s): POCGLU, POCNA, POCK, POCCL, POCBUN, POCHEMO, POCHCT in the last 72 hours.     Coags:   Lab Results   Component Value Date    PROTIME 10.4 02/12/2021    PROTIME 11.8 01/25/2021    INR 0.86 02/12/2021    APTT 30.4 02/12/2021       HCG (If Applicable):   Lab Results   Component Value Date PREGTESTUR NEGATIVE 07/10/2017        ABGs: No results found for: PHART, PO2ART, RLL1MLW, KIR2VFT, BEART, V3CXLIRX     Type & Screen (If Applicable):  No results found for: LABABO, LABRH    Drug/Infectious Status (If Applicable):  Lab Results   Component Value Date    HEPCAB 0.18 11/18/2015       COVID-19 Screening (If Applicable):   Lab Results   Component Value Date    COVID19 NOT DETECTED 03/03/2021    COVID19 THROAT 02/09/2021         Anesthesia Evaluation  Patient summary reviewed and Nursing notes reviewed no history of anesthetic complications:   Airway: Mallampati: II  TM distance: >3 FB   Neck ROM: full  Mouth opening: > = 3 FB Dental:          Pulmonary:normal exam    (+) COPD: moderate,  shortness of breath: chronic,  sleep apnea: on noncompliant,      (-) not a current smoker                           Cardiovascular:  Exercise tolerance: poor (<4 METS),   (+) hypertension:, PARSONS:,          Beta Blocker:  Not on Beta Blocker      ROS comment: ECG:  Normal sinus rhythm   Normal ECG   When compared with ECG of 10-FEB-2021 11:05,   No significant change was found   Confirmed by OPAL Cross ([id number]) on 3/4/2021 5:38:38 PM    STRESS TEST:   Stress Interpretation      normal stress echo, no regional wall motion abnormalities noted. normal   LVEF. The patient exercised according to the DOBUTAMINE for 12:41 min:s, achieving   a work level of Max. METS: 1.00. The resting heart rate of 82 bpm min to a maximal heart rate of   153 bpm. This value represents   91 % of the maximal, age-predicted heart rate. The resting blood pressure of   179/70 mmHg , min to a   maximum blood pressure of 196/80 mmHg. The exercise test was stopped due to   [address]    ECHO:  Summary   Left ventricular systolic function is normal.   Ejection fraction is visually estimated at 55%. Aneurysmal interatrial septum with positive bubble study.    No evidence of thrombus within the left atrial appendage. Moderate to severe tricuspid regurgitation is present. No evidence of any pericardial effusion. No evidence of endocarditis. Signature      ------------------------------------------------------------------   Electronically signed by Miracle Crowell MD   (Interpreting physician) on 01/16/2020 at 05:53 PM   ------------------------------------------------------------------     Neuro/Psych:   (+) seizures:, neuromuscular disease ( Fibromyalgia):, psychiatric history ( Pseudoseizure):depression/anxiety              ROS comment: Drug-seeking/Aberrant behavior GI/Hepatic/Renal:   (+) hiatal hernia, GERD:, PUD, liver disease ( Fatty liver disease, nonalcoholic):, morbid obesity         ROS comment: Pancreatitis chronic. Endo/Other:    (+) hypothyroidism: arthritis: OA., . Pt had no PAT visit        ROS comment: Lupus ([address] \"Rheumatoid Lupus\"   Osteomyelitis of fifth toe of left foot (HCC) Abdominal:           Vascular: negative vascular ROS. Anesthesia Plan      MAC     ASA 3       Induction: intravenous. Anesthetic plan and risks discussed with father. Plan discussed with JOSE MARIA.               Gustabo Trotter, MICHAEL - JOSE MARIA   5/27/2021

## 2021-05-27 NOTE — ED TRIAGE NOTES
Patient arrived in the ED with abdominal painand headach. Patient had taken percocet for pain 4hrs ago. Patient stated she had vomited X3 today. Dr. Hodges Resides informed of her condition and advised her to come to the ED if symptoms got worse.

## 2021-05-27 NOTE — PROGRESS NOTES
Skin assessment done on admission to unit with Juliocesar. Patient has dry, cracked skin noted right palm, patient states she sees a dermatologist for it. Scattered bruising noted, skin intact.

## 2021-05-27 NOTE — ED NOTES
Bed: ED-17  Expected date:   Expected time:   Means of arrival:   Comments:  Triage RB     Reid Deleon RN  05/26/21 [id number]

## 2021-05-27 NOTE — PROGRESS NOTES
Medication History  [facility]    Patient Name: Cecil Keane 1970     Medication history has been completed by: Sheron Gonzales CPhT    Source(s) of information: patient and insurance claims     Primary Care Physician: Timmy Boyd MD     Pharmacy: [facility]    Allergies as of 05/26/2021 - Fully Reviewed 05/26/2021   Allergen Reaction Noted    Aspirin Palpitations     Shellfish-derived products Swelling     Toradol [ketorolac tromethamine] Rash     Compazine [prochlorperazine]  04/13/2020    Reglan [metoclopramide] Itching 02/02/2020        Prior to Admission medications    Medication Sig Start Date End Date Taking?  Authorizing Provider   Melatonin 10 MG TABS Take 10-20 mg by mouth nightly as needed   Yes Historical Provider, MD   Cyanocobalamin (VITAMIN B 12 PO) Take 1 tablet by mouth daily   Yes Historical Provider, MD   dicyclomine (BENTYL) 10 MG capsule Take 1 capsule by mouth 3 times daily as needed (abdominal pain) 5/13/21  Yes Leanne Cano MD   hydrOXYzine (VISTARIL) 50 MG capsule Take 1 capsule by mouth every 6 hours as needed for Anxiety 5/13/21  Yes Leanne Cano MD   levETIRAcetam (KEPPRA) 1000 MG tablet Take 1 tablet by mouth 2 times daily 5/13/21  Yes Leanne Cano MD   levothyroxine (SYNTHROID) 125 MCG tablet Take 1 tablet by mouth Daily 5/13/21  Yes Leanne Cano MD   atenolol (TENORMIN) 25 MG tablet Take 1 tablet by mouth daily 5/13/21  Yes Leanne Cano MD   cloNIDine (CATAPRES) 0.1 MG tablet Take 1 tablet by mouth 2 times daily  Patient taking differently: Take 0.1 mg by mouth 2 times daily as needed 05/27/21 Patient states she only takes as needed 5/13/21  Yes Leanne Cano MD   estradiol (ESTRACE) 0.5 MG tablet Take 1 tablet by mouth daily  Patient taking differently: Take 0.5 mg by mouth nightly  5/13/21  Yes Leanne Cano MD   PARoxetine (PAXIL) 30 MG tablet Take 1.5 tablets by mouth nightly  Patient taking differently: Take 30 mg by mouth nightly  5/13/21  Yes Clarisse Carreon MD   pantoprazole (PROTONIX) 40 MG tablet Take 1 tablet by mouth daily (with breakfast) 5/11/21  Yes Vicente Saldivar MD   albuterol (PROVENTIL) (2.5 MG/3ML) 0.083% nebulizer solution Take 3 mLs by nebulization every 6 hours as needed for Wheezing 4/7/21  Yes MICHAEL Moser - CNP   scopolamine (TRANSDERM-SCOP) transdermal patch APPLY 1 PATCH TRANSDERMALLY TO THE SKIN BEHIND THE EAR ONCE EVERY 3 DAYS AS NEEDED 1/4/21  Yes Historical Provider, MD   potassium gluconate 550 mg tablet Take 1 tablet by mouth daily 3/25/21  Yes Historical Provider, MD   tiZANidine (ZANAFLEX) 4 MG tablet Take 4 mg by mouth every 8 hours as needed  3/3/21  Yes Historical Provider, MD   albuterol sulfate  (90 Base) MCG/ACT inhaler Inhale 2 puffs into the lungs 4 times daily 2/8/21  Yes Sb Alvarenga MD   ferrous sulfate (IRON 325) 325 (65 Fe) MG tablet Take 1 tablet by mouth daily (with breakfast) 10/23/20  Yes Andrea Hunt MD   oxyCODONE-acetaminophen (PERCOCET) 5-325 MG per tablet Take 1 tablet by mouth.  Every 4-6 hours PRN   Yes Historical Provider, MD   Cholecalciferol (VITAMIN D3) 5000 units TABS Take 1 tablet by mouth daily   Yes Historical Provider, MD   Multiple Vitamin (MVI, BARIATRIC ADVANTAGE MULTI-FORMULA, CHEW TAB) Take 1 tablet by mouth daily 2/22/19  Yes Andrea Hunt MD   Calcium Citrate-Vitamin D (CALCIUM + VIT D, BARIATRIC ADVANTAGE, CHEWABLE TABLET) Take 1 tablet by mouth 2 times daily 2/22/19  Yes Andrea Hunt MD   gabapentin (NEURONTIN) 800 MG tablet Take 800 mg by mouth 3 times daily   Yes Historical Provider, MD   betamethasone valerate (VALISONE) 0.1 % ointment APPLY OINTMENT TO AFFECTED AREA TWICE DAILY TO HANDS AND ELBOWS FOR 21 DAYS 3/26/21   Historical Provider, MD   EQ PINK-BISMUTH 262 MG chewable tablet CHEW & SWALLOW 2 TABLETS BY MOUTH 4 TIMES DAILY BEFORE MEAL(S) AND NIGHTLY FOR 5 DAYS 3/26/21   Historical Provider, MD   mupirocin (BACTROBAN) 2 % ointment APPLY A SMALL AMOUNT TO THE AFFECTED AREA TWICE DAILY FOR 5 TO 10 DAYS 3/27/21   Historical Provider, MD   ondansetron (ZOFRAN-ODT) 4 MG disintegrating tablet DISSOLVE 1 TABLET IN MOUTH EVERY 8 HOURS AS NEEDED FOR NAUSEA OR VOMITING 3/26/21   Historical Provider, MD       Medications added or changed (ex. new medication, dosage change, interval change, formulation change):  Vitamin B daily added  Melatonin 10-20 mg at hs prn (added)    Medications requiring reconciliation with provider:    Vitamin B daily added  Melatonin 10-20 mg at hs prn (added)    Comments:  Medication list reviewed with patient and insurance claims verified.     To my knowledge the above medication history is accurate as of 5/27/2021 9:00 AM.   Aldair Dumas CPhT   5/27/2021 9:00 AM

## 2021-05-27 NOTE — ED PROVIDER NOTES
[place]      PCP: Courtney Yee MD    [facility]    Chief Complaint   Patient presents with    Abdominal Pain    Emesis    Headache       This patient was not evaluated by the attending physician. I have independently evaluated this patient. My supervising physician was available for consultation. HPI    Liss Santa is a 44 y.o. female with PMH of HTN, chronic abdominal pain, GERD, seizures, COPD, lupus and PSH of gastric sleeve, cholecystectomy who presents with abdominal pain, nausea, vomiting, headache. Onset of nausea, vomiting, abdominal pain was 3 days ago. Abdominal pain is located in the right upper quadrant. Duration of pain has been constant since onset and sharp. Patient is prescribed Percocet, Zofran, and Phenergan but has been unable to keep her medications down so they are not helping with her symptoms. Severity of pain is rated 8 out of 10. Patient also reports gradual onset of headache this evening that is temporal in location. Headache is not worst of life. Patient reports that her symptoms worsen with eating or drinking. Patient reports that she is unable to tolerate anything by mouth today. She reports she had been trying to drink Pedialyte to stay hydrated. Patient reports that she has an appointment with Dr. Cruz Last on 6/7/2021 and plan is for exploratory surgery of abdomen to further evaluate her frequent abdominal pain as well as plan for Jet-en-Y gastric bypass as she currently has a gastric sleeve and has had many issues with abdominal pain, nausea, and vomiting since then. Patient denies constipation, melena, hematochezia, hematemesis, urinary symptoms, chest pain, shortness of breath.     REVIEW OF SYSTEMS    Constitutional:  Denies fever, chills  HENT:  Denies sore throat or ear pain   Cardiovascular:  Denies chest pain, palpitations or swelling   Respiratory:  Denies cough or shortness of breath   GI:  See HPI above  : No hematuria, urinary urgency, urinary frequency, dysuria. No vaginal symptoms. Musculoskeletal:  Denies back pain or groin pain or masses. No pain or swelling of extremities. Skin:  Denies rash  Neurologic:  + headache; Denies vision changes, hearing changes, speech changes, numbness, weakness, tingling, confusion, memory loss, lightheadedness, dizziness, syncope  Endocrine:  Denies polyuria or polydypsia   Lymphatic:  Denies swollen glands     All other review of systems are negative  See HPI and nursing notes for additional information     PAST MEDICAL & SURGICAL HISTORY    Past Medical History:   Diagnosis Date    Acid reflux     Anemia     Anxiety     Arthritis     Hands, Back And Ankles    Bleeding ulcer 2014    \"I Had Ulcers In My Stomach And Colon\"/ per pt on 8/12/2019\"they said recently having some blood in my stomach- in July ( 2019)could not find where coming from \"    Bronchitis Last Episode 2014    Chronic back pain     Chronic [facility]     [facility]    COPD (chronic obstructive pulmonary disease) ([facility] [address]     Sees Dr. Shereen Harrison Depression     Fibromyalgia Dx 2013    GERD (gastroesophageal reflux disease)     H/O echocardiogram 08/11/2015    EF >55%. LA to be at the upper limit of normal in size. LV hypertrophy with normal LV systolic, but abnormal diastolic function. Normal valvular structures and function.  H/O echocardiogram 08/30/2018    EF 55-60%    Hiatal hernia     History of blood transfusion 09/2015 And 2018    No Reaction To Blood Transfusions Received    History of sleeve gastrectomy     [place] (hard of hearing)     Right Ear    Hx of cardiac catheterization 10/24/2010    Angiographically normal coronary arteries w/ normal LV function and wall motion.  Hx of transesophageal echocardiography (PILO) for monitoring 12/02/2010    EF 55-60%. WNL.    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 name   Magda Reynolds cysts     \"they found that I have a kidney cyst but not sure which side\" per pt on 7/15/2020    Lupus ([address] Dx 2013    \"Rheumatoid Lupus\"    MDRO (multiple drug resistant organisms) resistance     4 months ago chest from mediport    Morbid obesity ([address]     MRSA bacteremia     Nausea     \"Most Of The Time\"    Osteomyelitis of fifth toe of left foot ([address]     Pain management     Sees Dr. Lewis Goodman, Once A Month    Pancreatitis chronic Dx 2001    Pneumonia Dx 11-15    Seizures ([address]     Shortness of breath on exertion     Shortness of breath on exertion     Sleep apnea     Has CPAP\"no longer use the cpap since lost weight\"    Staph infection Dx 11-15    Left Foot    Staph infection Dx 11-15    \"Left Foot\"    Teeth missing     Upper And Lower    Thyroid disease     Wears glasses     To Read     Past Surgical History:   Procedure Laterality Date    APPENDECTOMY  02/1998    Done When Tubes And Ovaries Were Removed    CARDIAC CATHETERIZATION  10/24/2010    CATHETER REMOVAL N/A 7/16/2019    PORT REMOVAL performed by Salma Miller MD at [facility]  08/27/1991    CHOLECYSTECTOMY, LAPAROSCOPIC  1990's    COLONOSCOPY  Last Done In 2000's    DENTAL SURGERY      Teeth Extracted In Past    ENDOSCOPY, COLON, DIAGNOSTIC  Last Done In 2018    FOOT DEBRIDEMENT Left 6/16/2019    FIRST METATARSAL DEBRIDEMENT INCISION AND DRAINAGE. EXCISION OF ULCER.  RESECTION OF BONE. 1ST METATARSAL POWER LAVAGE AND BONE CEMENT performed by Radha Allen DPM at [address] Left Last Done In 2016     Dr. Daniel Mendez, \" About 6 Surgeries Done Because Of Staph Infection\"    HIATAL HERNIA REPAIR N/A 2/12/2019    HERNIA HIATAL LAPAROSCOPIC ROBOTIC performed by Summer Morris MD at [facility] 5  10/1997    Partial Abdominal Hysterectomy    INSERTION / REMOVAL / REPLACEMENT VENOUS ACCESS CATHETER N/A 8/15/2019    PORT INSERTION performed by Summer Morris MD at [facility]    removed after 6 months    LEG BIOPSY EXCISION Left 3/8/2021    LEFT THIGH LESION BIOPSY EXCISION performed by Summer Morris MD at [facility], PARTIAL Left 2008    Benign    OTHER SURGICAL HISTORY  02/1998    \"Tubes And Ovaries Removed, Appendectomy Also Done\"    OTHER SURGICAL HISTORY  Last Done 7-15-16    Mediport Insertion \"Total Of Six Done, Removed Last Mediport In 2014\"    PORT SURGERY N/A 1/15/2020    PORT REMOVAL performed by Summer Morris MD at [facility] N/A 7/6/2020    PORT INSERTION performed by Summer Morris MD at [facility] N/A 2/12/2019    GASTRECTOMY SLEEVE LAPAROSCOPIC ROBOTIC performed by uSmmer Morris MD at [address] [place] [address]  08/27/2018    UPPER GASTROINTESTINAL ENDOSCOPY N/A 4/2/2019    EGD CONTROL HEMORRHAGE with epi injection at bleeding site performed by Summer Morris MD at [address] [place] [address] 6/19/2019    EGD DIAGNOSTIC ONLY performed by Summer Morris MD at [address] [place] [address] 7/22/2019    EGD DIAGNOSTIC ONLY performed by Yeny Ha MD at [address] [place] [address] 10/14/2019    EGD DIAGNOSTIC ONLY performed by Summer Morris MD at [facility] N/A 7/17/2020    EGJ DILATATION BALLOON WITH 18-20 MM BALLOON, DILATED TO 20 MM. [facility]    Current Outpatient Rx   Medication Sig Dispense Refill    dicyclomine (BENTYL) 10 MG capsule Take 1 capsule by mouth 3 times daily as needed (abdominal pain) 21 capsule 3    hydrOXYzine (VISTARIL) 50 MG capsule Take 1 capsule by mouth every 6 hours as needed for Anxiety 30 capsule 3    levETIRAcetam (KEPPRA) 1000 MG tablet Take 1 tablet by mouth 2 times daily 60 tablet 3    levothyroxine (SYNTHROID) 125 MCG tablet Take 1 tablet by mouth Daily 30 tablet 3    atenolol (TENORMIN) 25 MG tablet Take 1 tablet by mouth daily 30 tablet 3    cloNIDine (CATAPRES) 0.1 MG tablet Take 1 tablet by mouth 2 times daily 60 tablet 3    estradiol (ESTRACE) 0.5 MG tablet Take 1 tablet by mouth daily 21 tablet 3    PARoxetine (PAXIL) 30 MG tablet Take 1.5 tablets by mouth nightly 30 tablet 3    pantoprazole (PROTONIX) 40 MG tablet Take 1 tablet by mouth daily (with breakfast) 30 tablet 3    albuterol (PROVENTIL) (2.5 MG/3ML) 0.083% nebulizer solution Take 3 mLs by nebulization every 6 hours as needed for Wheezing 120 each 0    betamethasone valerate (VALISONE) 0.1 % ointment APPLY OINTMENT TO AFFECTED AREA TWICE DAILY TO HANDS AND ELBOWS FOR 21 DAYS      EQ PINK-BISMUTH 262 MG chewable tablet CHEW & SWALLOW 2 TABLETS BY MOUTH 4 TIMES DAILY BEFORE MEAL(S) AND NIGHTLY FOR 5 DAYS      mupirocin (BACTROBAN) 2 % ointment APPLY A SMALL AMOUNT TO THE AFFECTED AREA TWICE DAILY FOR 5 TO 10 DAYS      ondansetron (ZOFRAN-ODT) 4 MG disintegrating tablet DISSOLVE 1 TABLET IN MOUTH EVERY 8 HOURS AS NEEDED FOR NAUSEA OR VOMITING      scopolamine (TRANSDERM-SCOP) transdermal patch APPLY 1 PATCH TRANSDERMALLY TO THE SKIN BEHIND THE EAR ONCE EVERY 3 DAYS AS NEEDED      potassium gluconate 550 mg tablet Take 1 tablet by mouth daily      tiZANidine (ZANAFLEX) 4 MG tablet TAKE 1 TABLET BY MOUTH THREE TIMES DAILY AS NEEDED      albuterol sulfate  (90 Base) MCG/ACT inhaler Inhale 2 puffs into the lungs 4 times daily 1 Inhaler 5    ferrous sulfate (IRON 325) 325 (65 Fe) MG tablet Take 1 tablet by mouth daily (with breakfast) 90 tablet 5    oxyCODONE-acetaminophen (PERCOCET) 5-325 MG per tablet Take 1 tablet by mouth. Every 4-6 hours PRN      Cholecalciferol (VITAMIN D3) 5000 units TABS Take 1 tablet by mouth daily      Multiple Vitamin (MVI, BARIATRIC ADVANTAGE MULTI-FORMULA, CHEW TAB) Take 1 tablet by mouth daily 30 tablet 5    Calcium Citrate-Vitamin D (CALCIUM + VIT D, BARIATRIC ADVANTAGE, CHEWABLE TABLET) Take 1 tablet by mouth 2 times daily 120 tablet 5    gabapentin (NEURONTIN) 800 MG tablet Take 800 mg by mouth 3 times daily         ALLERGIES    Allergies   Allergen Reactions    Aspirin Palpitations     \"My Heart Rate Elevates\"    Shellfish-Derived Products Swelling    Toradol [Ketorolac Tromethamine] Rash    Compazine [Prochlorperazine]     Reglan [Metoclopramide] Itching       SOCIAL AND FAMILY HISTORY    Social History     Socioeconomic History    Marital status:      Spouse name: None    Number of children: None    Years of education: None    Highest education level: None   Occupational History    None   Tobacco Use    Smoking status: Never Smoker    Smokeless tobacco: Never Used   Vaping Use    Vaping Use: Never used   Substance and Sexual Activity    Alcohol use: No     Alcohol/week: 0.0 standard drinks    Drug use: No    Sexual activity: Not Currently   Other Topics Concern    None   Social History Narrative    None     Social Determinants of Health     Financial Resource Strain:     Difficulty of Paying Living Expenses:    Food Insecurity:     Worried About Running Out of Food in the Last Year:     Ran Out of Food in the Last Year:    Transportation Needs:     Lack of Transportation (Medical):      Lack of Transportation (Non-Medical):    Physical Activity:     Days of Exercise per Week:     Minutes of Exercise per Session:    Stress:     Feeling of Stress :    Social Connections:     Frequency of Communication with Friends and Family:     Frequency of Social Gatherings with Friends and Family:     Attends Voodoo Services:     Active Member of Clubs or Organizations:     Attends Club or Organization Meetings:     Marital Status:    Intimate Partner Violence:     Fear of Current or Ex-Partner:     Emotionally Abused:     Physically Abused:     Sexually Abused:      Family History   Problem Relation Age of Onset    Diabetes Mother         \"Borderline Diabetes\"    High Blood Pressure Mother     Obesity Mother     Arthritis Mother     Heart Disease Mother     High Cholesterol Mother     Vision Loss Mother     Diabetes Father     High Blood Pressure Father     Asthma Father     Cancer Father         prostate cancer    High Blood Pressure Brother     Asthma Son     Vision Loss Son     Lupus Daughter     Other Daughter         \"Alot Of Female Problems\"       PHYSICAL EXAM    VITAL SIGNS: /78   Pulse 72   Temp 98.1 °F (36.7 °C) (Oral)   Resp 21   Ht 5' 4\" (1.626 m)   Wt 266 lb (120.7 kg)   SpO2 100%   BMI 45.66 kg/m²   Constitutional:  Well developed, well nourished. No distress  Eyes:  Sclera nonicteric, conjunctiva moist  HENT:  Atraumatic. PERRL. EOMI. Mildly tacky mucus membranes. Neck/Lymphatics: supple, no JVD, no swollen nodes  Respiratory:  No retractions, no accessory muscle use, normal breath sounds   Cardiovascular:  normal rate, normal rhythm, no murmurs    GI:    No gross discoloration.       -no Calhoun's sign (periumbilical ecchymosis)       -no Grey-Armstrong's sign (flank ecchymosis)    Bowel sounds present, no audible bruits. Soft, no distention, no guarding, no rigidity,   + RUQ abdominal tenderness to palpation, right mid abdominal tenderness to palpation-no other abdominal tenderness to palpation on exam, no rebound tenderness, no palpable pulsatile masses,   No McBurney's point tenderness   Negative Rovsing sign    Negative Champion's sign. Back:  No CVA tenderness to percussion.   Musculoskeletal:  No edema, no deformity  Vascular: DP pulses 2+ equal bilaterally  Integument: U/L    AST 17 15 - 37 IU/L    Alkaline Phosphatase 93 40 - 129 IU/L    GFR Non-African American >60 >60 mL/min/1.73m2    GFR African American >60 >60 mL/min/1.73m2    Anion Gap 10 4 - 16   Lipase   Result Value Ref Range    Lipase 20 13 - 60 IU/L   Troponin   Result Value Ref Range    Troponin T <0.010 <0.01 NG/ML   Urinalysis   Result Value Ref Range    Color, UA YELLOW YELLOW    Clarity, UA CLEAR CLEAR    Glucose, Urine NEGATIVE NEGATIVE MG/DL    Bilirubin Urine NEGATIVE NEGATIVE MG/DL    Ketones, Urine NEGATIVE NEGATIVE MG/DL    Specific Gravity, UA 1.029 1.001 - 1.035    Blood, Urine NEGATIVE NEGATIVE    pH, Urine 5.0 5.0 - 8.0    Protein, UA NEGATIVE NEGATIVE MG/DL    Urobilinogen, Urine NEGATIVE 0.2 - 1.0 MG/DL    Nitrite Urine, Quantitative NEGATIVE NEGATIVE    Leukocyte Esterase, Urine NEGATIVE NEGATIVE    RBC, UA NONE SEEN 0 - 6 /HPF    WBC, UA NONE SEEN 0 - 5 /HPF    Bacteria, UA NEGATIVE NEGATIVE /HPF    Squam Epithel, UA 1 /HPF    Mucus, UA RARE (A) NEGATIVE HPF    Trichomonas, UA NONE SEEN NONE SEEN /HPF    Hyaline Casts, UA 5 /LPF           RADIOLOGY/PROCEDURES    No orders to display            ED COURSE & MEDICAL DECISION MAKING       Vital signs and nursing notes reviewed during ED course. I have independently evaluated this patient. Supervising physician present in the Emergency Department, available for consultation, throughout entirety of  patient care. Patient presents as above with nausea vomiting abdominal pain, and headache which prompted workup. While in the ED today, labs and EKG were obtained. For EKG interpretation-ED physician's note. No leukocytosis or leukopenia. CMP without emergent findings. Lipase within normal limits- low clinical suspicion for pancreatitis. Troponin is negative. Urinalysis reveals no evidence of infection and and no ketones or increase specific gravity is present. Abdominal exam without peritoneal signs.  Patient treated with IV fluids, IM Phenergan, and IV morphine in the ED with only temporary improvement in abdominal pain and continues to have nausea and vomiting. I have been ordered Haldol as this is not on patient's medication list but she declines to take this medication as she reports that it makes her shaky. Patient still having abdominal pain and therefore IV Levsin was ordered as well as IV Pepcid and IV Zofran ordered for nausea and vomiting. Patient continues to have abdominal pain as well as nausea and vomiting. Patient is neurologically intact on examination today. Patient has had 6 CTs of her abdomen pelvis this year and on chart review patient has had 40 CTs of the abdomen and pelvis since digital chart begins in October 2010 and therefore did not obtain CT abdomen pelvis today given reassuring labs and abdominal exam without peritoneal signs to try to decrease patient's risk of radiation given that she has had many dosages of radiation for CTs of abdomen pelvis in the past.  Patient reports that Dr. Estelle Cloud plans to evaluate her on 6/7/2021 with plan for exploratory abdominal surgery to further evaluate her chronic abdominal pain as well as plan for Jet-en-Y surgery. Given patient's intractable symptoms I will admit to hospitalist for further care. I consulted with hospitalist, Dr. Tirso Razo, and we discussed the patient's case. He agrees admit the patient at this time. All pertinent Lab data and radiographic results reviewed with patient at bedside. The patient and/or the family were informed of the results of any tests/labs/imaging, the treatment plan, and time was allotted to answer questions. Diagnosis, disposition, and treatment plan reviewed in detail with patient. Patient understands and agrees to admission at this time. In consideration of current COVID19 pandemic, with effort to minimize unnecessary provider exposure, this patient was seen at bedside by me independently.   However, in compliance with current hospital KEN/ED protocol, prior to admission I did discuss this patient case with emergency department physician, Dr. Nahed Gilbert. Of note, this Pt was NOT admitted to the ICU. Clinical  IMPRESSION    1. Intractable vomiting with nausea, unspecified vomiting type    2. Abdominal pain, unspecified abdominal location    3. Headache, unspecified headache type        Disposition:  Admission    Comment: Please note this report has been produced using speech recognition software and may contain errors related to that system including errors in grammar, punctuation, and spelling, as well as words and phrases that may be inappropriate. If there are any questions or concerns please feel free to contact the dictating provider for clarification.        Macho Pavon PA-C  05/27/21 0255

## 2021-05-27 NOTE — H&P
General: No focal deficit present. Mental Status: She is alert and oriented to person, place, and time. Mental status is at baseline. Cranial Nerves: No cranial nerve deficit, dysarthria or facial asymmetry. Motor: No tremor or seizure activity. Psychiatric:         Mood and Affect: Mood is not anxious. Speech: She is communicative. Speech is not slurred. Behavior: Behavior is cooperative. Past Medical History:      Past Medical History:   Diagnosis Date    Acid reflux     Anemia     Anxiety     Arthritis     Hands, Back And Ankles    Bleeding ulcer 2014    \"I Had Ulcers In My Stomach And Colon\"/ per pt on 8/12/2019\"they said recently having some blood in my stomach- in July ( 2019)could not find where coming from \"    Bronchitis Last Episode 2014    Chronic back pain     Chronic pain     Sees Dr. Tracy Carcamo COPD (chronic obstructive pulmonary disease) ([facility] [address]     Sees Dr. Michael Denton Depression     Fibromyalgia Dx 2013    GERD (gastroesophageal reflux disease)     H/O echocardiogram 08/11/2015    EF >55%. LA to be at the upper limit of normal in size. LV hypertrophy with normal LV systolic, but abnormal diastolic function. Normal valvular structures and function.  H/O echocardiogram 08/30/2018    EF 55-60%    Hiatal hernia     History of blood transfusion 09/2015 And 2018    No Reaction To Blood Transfusions Received    History of sleeve gastrectomy     [place] (hard of hearing)     Right Ear    Hx of cardiac catheterization 10/24/2010    Angiographically normal coronary arteries w/ normal LV function and wall motion.  Hx of transesophageal echocardiography (PILO) for monitoring 12/02/2010    EF 55-60%. WNL.    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 in\"( 8/15/2019)    Hypertension     follow with Dr ? name   Matthew Frandy cysts     [facility] found that I have a kidney cyst but not sure which side\" per pt on 7/15/2020    Lupus ([address] Dx 2013    \"Rheumatoid Lupus\"    MDRO (multiple drug resistant organisms) resistance     4 months ago chest from mediport    Morbid obesity ([address]     MRSA bacteremia     Nausea     \"Most Of The Time\"    Osteomyelitis of fifth toe of left foot ([address]     Pain management     Sees Dr. Josh Griffiths, Once A Month    Pancreatitis chronic Dx     Pneumonia Dx -15    Seizures ([address]     Shortness of breath on exertion     Shortness of breath on exertion     Sleep apnea     Has CPAP\"no longer use the cpap since lost weight\"    Staph infection Dx -15    Left Foot    Staph infection Dx -15    \"Left Foot\"    Teeth missing     Upper And Lower    Thyroid disease     Wears glasses     To Read     PSHX:  has a past surgical history that includes Lung removal, partial (Left, );  section (1991); Foot surgery (Left, Last Done In ); Dental surgery; Cholecystectomy, laparoscopic (7031'F); other surgical history (1998); other surgical history (Last Done 7-15-16); Tonsillectomy and adenoidectomy (); Appendectomy (1998); Upper gastrointestinal endoscopy (2018); Lap Band (~); Hysterectomy (10/1997); Endoscopy, colon, diagnostic (Last Done In 2018); Colonoscopy (Last Done In ); Cardiac catheterization (10/24/2010); Sleeve Gastrectomy (N/A, 2019); hiatal hernia repair (N/A, 2019); Upper gastrointestinal endoscopy (N/A, 2019); Foot Debridement (Left, 2019); Upper gastrointestinal endoscopy (N/A, 2019); Catheter Removal (N/A, 2019); Upper gastrointestinal endoscopy (N/A, 2019); INSERTION / REMOVAL / REPLACEMENT VENOUS ACCESS CATHETER (N/A, 8/15/2019); Upper gastrointestinal endoscopy (N/A, 10/14/2019);  Anthony Rojas  Surgery (N/A, 1/15/2020); [facility] Surgery (N/A, 7/6/2020); Upper gastrointestinal endoscopy (N/A, 7/17/2020); and Leg biopsy excision (Left, 3/8/2021). Allergies: Allergies   Allergen Reactions    Aspirin Palpitations     \"My Heart Rate Elevates\"    Shellfish-Derived Products Swelling    Toradol [Ketorolac Tromethamine] Rash    Compazine [Prochlorperazine]     Reglan [Metoclopramide] Itching       FAM HX: family history includes Arthritis in her mother; Asthma in her father and son; Cancer in her father; Diabetes in her father and mother; Heart Disease in her mother; High Blood Pressure in her brother, father, and mother; High Cholesterol in her mother; Lupus in her daughter; Obesity in her mother; Other in her daughter; Vision Loss in her mother and son. Soc HX:   Social History     Socioeconomic History    Marital status:      Spouse name: None    Number of children: None    Years of education: None    Highest education level: None   Occupational History    None   Tobacco Use    Smoking status: Never Smoker    Smokeless tobacco: Never Used   Vaping Use    Vaping Use: Never used   Substance and Sexual Activity    Alcohol use: No     Alcohol/week: 0.0 standard drinks    Drug use: No    Sexual activity: Not Currently   Other Topics Concern    None   Social History Narrative    None     Social Determinants of Health     Financial Resource Strain:     Difficulty of Paying Living Expenses:    Food Insecurity:     Worried About Running Out of Food in the Last Year:     Ran Out of Food in the Last Year:    Transportation Needs:     Lack of Transportation (Medical):      Lack of Transportation (Non-Medical):    Physical Activity:     Days of Exercise per Week:     Minutes of Exercise per Session:    Stress:     Feeling of Stress :    Social Connections:     Frequency of Communication with Friends and Family:     Frequency of Social Gatherings with Friends and Family:     Attends Church [employer] 07/10/2017       Medications:   Home Medications:   Prior to Admission medications    Medication Sig Start Date End Date Taking?  Authorizing Provider   dicyclomine (BENTYL) 10 MG capsule Take 1 capsule by mouth 3 times daily as needed (abdominal pain) 5/13/21   Ilir Palmer MD   hydrOXYzine (VISTARIL) 50 MG capsule Take 1 capsule by mouth every 6 hours as needed for Anxiety 5/13/21   Ilir Palmer MD   levETIRAcetam (KEPPRA) 1000 MG tablet Take 1 tablet by mouth 2 times daily 5/13/21   Ilir Palmer MD   levothyroxine (SYNTHROID) 125 MCG tablet Take 1 tablet by mouth Daily 5/13/21   Ilir Palmer MD   atenolol (TENORMIN) 25 MG tablet Take 1 tablet by mouth daily 5/13/21   Ilir Palmer MD   cloNIDine (CATAPRES) 0.1 MG tablet Take 1 tablet by mouth 2 times daily 5/13/21   Ilir Palmer MD   estradiol (ESTRACE) 0.5 MG tablet Take 1 tablet by mouth daily 5/13/21   Ilir Palmer MD   PARoxetine (PAXIL) 30 MG tablet Take 1.5 tablets by mouth nightly 5/13/21   Ilir Palmer MD   pantoprazole (PROTONIX) 40 MG tablet Take 1 tablet by mouth daily (with breakfast) 5/11/21   Terrence White MD   albuterol (PROVENTIL) (2.5 MG/3ML) 0.083% nebulizer solution Take 3 mLs by nebulization every 6 hours as needed for Wheezing 4/7/21   MICHAEL Weir - CNP   betamethasone valerate (VALISONE) 0.1 % ointment APPLY OINTMENT TO AFFECTED AREA TWICE DAILY TO HANDS AND ELBOWS FOR 21 DAYS 3/26/21   Historical Provider, MD   EQ PINK-BISMUTH 262 MG chewable tablet CHEW & SWALLOW 2 TABLETS BY MOUTH 4 TIMES DAILY BEFORE MEAL(S) AND NIGHTLY FOR 5 DAYS 3/26/21   Historical Provider, MD   mupirocin (BACTROBAN) 2 % ointment APPLY A SMALL AMOUNT TO THE AFFECTED AREA TWICE DAILY FOR 5 TO 10 DAYS 3/27/21   Historical Provider, MD   ondansetron (ZOFRAN-ODT) 4 MG disintegrating tablet DISSOLVE 1 TABLET IN MOUTH EVERY 8 HOURS AS NEEDED FOR NAUSEA OR VOMITING 3/26/21   Historical Provider, MD   scopolamine (TRANSDERM-SCOP) transdermal patch APPLY 1 PATCH TRANSDERMALLY TO THE SKIN BEHIND THE EAR ONCE EVERY 3 DAYS AS NEEDED 1/4/21   Historical Provider, MD   potassium gluconate 550 mg tablet Take 1 tablet by mouth daily 3/25/21   Historical Provider, MD   tiZANidine (ZANAFLEX) 4 MG tablet TAKE 1 TABLET BY MOUTH THREE TIMES DAILY AS NEEDED 3/3/21   Bryn Roth MD   albuterol sulfate  (90 Base) MCG/ACT inhaler Inhale 2 puffs into the lungs 4 times daily 2/8/21   Giulia Driver MD   ferrous sulfate (IRON 325) 325 (65 Fe) MG tablet Take 1 tablet by mouth daily (with breakfast) 10/23/20   Michael Santa MD   oxyCODONE-acetaminophen (PERCOCET) 5-325 MG per tablet Take 1 tablet by mouth. Every 4-6 hours PRN    Historical Provider, MD   Cholecalciferol (VITAMIN D3) 5000 units TABS Take 1 tablet by mouth daily    Historical Provider, MD   Multiple Vitamin (MVI, BARIATRIC ADVANTAGE MULTI-FORMULA, CHEW TAB) Take 1 tablet by mouth daily 2/22/19   Michael Santa MD   Calcium Citrate-Vitamin D (CALCIUM + VIT D, BARIATRIC ADVANTAGE, CHEWABLE TABLET) Take 1 tablet by mouth 2 times daily 2/22/19   Michael Santa MD   gabapentin (NEURONTIN) 800 MG tablet Take 800 mg by mouth 3 times daily    Historical Provider, MD     Medications:    haloperidol lactate  5 mg Intramuscular Once      Infusions:   PRN Meds:      Electronically signed by Malia Zaidi DO on 5/27/2021 at 3:42 AM      This dictation was created with voice recognition software. While attempts have been made to review the dictation as it is transcribed, on occasion the spoken word can be misinterpreted by the technology leading to omissions or inappropriate words, phrases or sentences.

## 2021-05-27 NOTE — ED NOTES
Patient informed this nurse that she \"doubt's medication with help with her pain. Education about medication provided. Encouraged patient to give the medication a chance to work and reassess then.               Anitha Bourgeois RN  05/27/21 1341

## 2021-05-27 NOTE — ED NOTES
Patient refused Haldol for nausea. She stated \"it gives me the shakes. \"     Adelita Monroe, JACKIE  05/27/21 2850

## 2021-05-27 NOTE — CARE COORDINATION
Patient identified as potential readmission. Last admission 5/8-5/11 for acute gastritis. Patient here today for abdominal pain, nausea and vomiting. Per physician documentation \"Patient has had 6 CTs of her abdomen pelvis this year and on chart review patient has had 40 CTs of the abdomen and pelvis since digital chart begins in October 2010 and therefore did not obtain CT abdomen pelvis today given reassuring labs and abdominal exam without peritoneal signs to try to decrease patient's risk of radiation given that she has had many dosages of radiation for CTs of abdomen pelvis in the past.\" Patient has an appointment scheduled with Dr Richad Gottron on 6/7. Patient from home, has insurance and PCP. Patient is requiring readmission for intractable abdominal pain, nausea and vomiting and there were no alternatives to admission to explore at this time.

## 2021-05-28 ENCOUNTER — ANESTHESIA (OUTPATIENT)
Dept: ENDOSCOPY | Age: 51
End: 2021-05-28
Payer: COMMERCIAL

## 2021-05-28 VITALS
SYSTOLIC BLOOD PRESSURE: 94 MMHG | OXYGEN SATURATION: 98 % | RESPIRATION RATE: 20 BRPM | DIASTOLIC BLOOD PRESSURE: 48 MMHG

## 2021-05-28 LAB
ANION GAP SERPL CALCULATED.3IONS-SCNC: 8 MMOL/L (ref 4–16)
BUN BLDV-MCNC: 10 MG/DL (ref 6–23)
CALCIUM SERPL-MCNC: 10.1 MG/DL (ref 8.3–10.6)
CHLORIDE BLD-SCNC: 107 MMOL/L (ref 99–110)
CO2: 22 MMOL/L (ref 21–32)
CREAT SERPL-MCNC: 0.7 MG/DL (ref 0.6–1.1)
GFR AFRICAN AMERICAN: >60 ML/MIN/1.73M2
GFR NON-AFRICAN AMERICAN: >60 ML/MIN/1.73M2
GLUCOSE BLD-MCNC: 105 MG/DL (ref 70–99)
HCT VFR BLD CALC: 36.6 % (ref 37–47)
HEMOGLOBIN: 11.4 GM/DL (ref 12.5–16)
MCH RBC QN AUTO: 25.4 PG (ref 27–31)
MCHC RBC AUTO-ENTMCNC: 31.1 % (ref 32–36)
MCV RBC AUTO: 81.5 FL (ref 78–100)
PDW BLD-RTO: 15 % (ref 11.7–14.9)
PLATELET # BLD: 171 K/CU MM (ref 140–440)
PMV BLD AUTO: 10.2 FL (ref 7.5–11.1)
POTASSIUM SERPL-SCNC: 4.2 MMOL/L (ref 3.5–5.1)
RBC # BLD: 4.49 M/CU MM (ref 4.2–5.4)
SODIUM BLD-SCNC: 137 MMOL/L (ref 135–145)
WBC # BLD: 3.5 K/CU MM (ref 4–10.5)

## 2021-05-28 PROCEDURE — C9113 INJ PANTOPRAZOLE SODIUM, VIA: HCPCS | Performed by: STUDENT IN AN ORGANIZED HEALTH CARE EDUCATION/TRAINING PROGRAM

## 2021-05-28 PROCEDURE — 96372 THER/PROPH/DIAG INJ SC/IM: CPT

## 2021-05-28 PROCEDURE — 6370000000 HC RX 637 (ALT 250 FOR IP): Performed by: SURGERY

## 2021-05-28 PROCEDURE — 99024 POSTOP FOLLOW-UP VISIT: CPT | Performed by: SURGERY

## 2021-05-28 PROCEDURE — 6360000002 HC RX W HCPCS: Performed by: INTERNAL MEDICINE

## 2021-05-28 PROCEDURE — 3700000001 HC ADD 15 MINUTES (ANESTHESIA): Performed by: SURGERY

## 2021-05-28 PROCEDURE — 88342 IMHCHEM/IMCYTCHM 1ST ANTB: CPT

## 2021-05-28 PROCEDURE — 6360000002 HC RX W HCPCS: Performed by: STUDENT IN AN ORGANIZED HEALTH CARE EDUCATION/TRAINING PROGRAM

## 2021-05-28 PROCEDURE — 6370000000 HC RX 637 (ALT 250 FOR IP): Performed by: STUDENT IN AN ORGANIZED HEALTH CARE EDUCATION/TRAINING PROGRAM

## 2021-05-28 PROCEDURE — 2500000003 HC RX 250 WO HCPCS

## 2021-05-28 PROCEDURE — 96376 TX/PRO/DX INJ SAME DRUG ADON: CPT

## 2021-05-28 PROCEDURE — 36415 COLL VENOUS BLD VENIPUNCTURE: CPT

## 2021-05-28 PROCEDURE — 80048 BASIC METABOLIC PNL TOTAL CA: CPT

## 2021-05-28 PROCEDURE — 2580000003 HC RX 258: Performed by: ANESTHESIOLOGY

## 2021-05-28 PROCEDURE — 3609012400 HC EGD TRANSORAL BIOPSY SINGLE/MULTIPLE: Performed by: SURGERY

## 2021-05-28 PROCEDURE — G0378 HOSPITAL OBSERVATION PER HR: HCPCS

## 2021-05-28 PROCEDURE — 2709999900 HC NON-CHARGEABLE SUPPLY: Performed by: SURGERY

## 2021-05-28 PROCEDURE — 6360000002 HC RX W HCPCS: Performed by: SURGERY

## 2021-05-28 PROCEDURE — 43239 EGD BIOPSY SINGLE/MULTIPLE: CPT | Performed by: SURGERY

## 2021-05-28 PROCEDURE — 2580000003 HC RX 258: Performed by: STUDENT IN AN ORGANIZED HEALTH CARE EDUCATION/TRAINING PROGRAM

## 2021-05-28 PROCEDURE — 96375 TX/PRO/DX INJ NEW DRUG ADDON: CPT

## 2021-05-28 PROCEDURE — 85027 COMPLETE CBC AUTOMATED: CPT

## 2021-05-28 PROCEDURE — 6360000002 HC RX W HCPCS

## 2021-05-28 PROCEDURE — 88305 TISSUE EXAM BY PATHOLOGIST: CPT

## 2021-05-28 PROCEDURE — 6370000000 HC RX 637 (ALT 250 FOR IP): Performed by: INTERNAL MEDICINE

## 2021-05-28 PROCEDURE — 87077 CULTURE AEROBIC IDENTIFY: CPT

## 2021-05-28 PROCEDURE — 3700000000 HC ANESTHESIA ATTENDED CARE: Performed by: SURGERY

## 2021-05-28 RX ORDER — KETAMINE HYDROCHLORIDE 10 MG/ML
INJECTION, SOLUTION INTRAMUSCULAR; INTRAVENOUS PRN
Status: DISCONTINUED | OUTPATIENT
Start: 2021-05-28 | End: 2021-05-28 | Stop reason: SDUPTHER

## 2021-05-28 RX ORDER — PROPOFOL 10 MG/ML
INJECTION, EMULSION INTRAVENOUS PRN
Status: DISCONTINUED | OUTPATIENT
Start: 2021-05-28 | End: 2021-05-28 | Stop reason: SDUPTHER

## 2021-05-28 RX ORDER — CHOLESTYRAMINE LIGHT 4 G/5.7G
4 POWDER, FOR SUSPENSION ORAL 3 TIMES DAILY
Status: DISCONTINUED | OUTPATIENT
Start: 2021-05-28 | End: 2021-05-30 | Stop reason: HOSPADM

## 2021-05-28 RX ORDER — HYDROXYZINE PAMOATE 25 MG/1
25 CAPSULE ORAL 3 TIMES DAILY PRN
Status: DISCONTINUED | OUTPATIENT
Start: 2021-05-28 | End: 2021-05-30 | Stop reason: HOSPADM

## 2021-05-28 RX ORDER — LORAZEPAM 2 MG/ML
2 INJECTION INTRAMUSCULAR ONCE
Status: COMPLETED | OUTPATIENT
Start: 2021-05-28 | End: 2021-05-28

## 2021-05-28 RX ORDER — LIDOCAINE HYDROCHLORIDE 20 MG/ML
INJECTION, SOLUTION INFILTRATION; PERINEURAL PRN
Status: DISCONTINUED | OUTPATIENT
Start: 2021-05-28 | End: 2021-05-28 | Stop reason: SDUPTHER

## 2021-05-28 RX ORDER — SODIUM CHLORIDE, SODIUM LACTATE, POTASSIUM CHLORIDE, CALCIUM CHLORIDE 600; 310; 30; 20 MG/100ML; MG/100ML; MG/100ML; MG/100ML
INJECTION, SOLUTION INTRAVENOUS CONTINUOUS
Status: DISCONTINUED | OUTPATIENT
Start: 2021-05-28 | End: 2021-05-29

## 2021-05-28 RX ADMIN — PROPOFOL 70 MG: 10 INJECTION, EMULSION INTRAVENOUS at 07:11

## 2021-05-28 RX ADMIN — ACETAMINOPHEN 650 MG: 325 TABLET ORAL at 23:09

## 2021-05-28 RX ADMIN — MORPHINE SULFATE 2 MG: 4 INJECTION, SOLUTION INTRAMUSCULAR; INTRAVENOUS at 15:04

## 2021-05-28 RX ADMIN — PROPOFOL 20 MG: 10 INJECTION, EMULSION INTRAVENOUS at 07:13

## 2021-05-28 RX ADMIN — ONDANSETRON 4 MG: 2 INJECTION INTRAMUSCULAR; INTRAVENOUS at 05:27

## 2021-05-28 RX ADMIN — PROPOFOL 60 MG: 10 INJECTION, EMULSION INTRAVENOUS at 07:15

## 2021-05-28 RX ADMIN — MORPHINE SULFATE 2 MG: 4 INJECTION, SOLUTION INTRAMUSCULAR; INTRAVENOUS at 18:23

## 2021-05-28 RX ADMIN — LIDOCAINE HYDROCHLORIDE 50 MG: 20 INJECTION, SOLUTION INFILTRATION; PERINEURAL at 07:12

## 2021-05-28 RX ADMIN — CHOLESTYRAMINE 4 G: 4 POWDER, FOR SUSPENSION ORAL at 15:04

## 2021-05-28 RX ADMIN — SODIUM CHLORIDE, POTASSIUM CHLORIDE, SODIUM LACTATE AND CALCIUM CHLORIDE: 600; 310; 30; 20 INJECTION, SOLUTION INTRAVENOUS at 23:09

## 2021-05-28 RX ADMIN — PANTOPRAZOLE SODIUM 40 MG: 40 INJECTION, POWDER, FOR SOLUTION INTRAVENOUS at 09:29

## 2021-05-28 RX ADMIN — PROMETHAZINE HYDROCHLORIDE 12.5 MG: 25 INJECTION INTRAMUSCULAR; INTRAVENOUS at 15:04

## 2021-05-28 RX ADMIN — KETAMINE HYDROCHLORIDE 20 MG: 10 INJECTION INTRAMUSCULAR; INTRAVENOUS at 07:11

## 2021-05-28 RX ADMIN — SODIUM CHLORIDE, PRESERVATIVE FREE 10 ML: 5 INJECTION INTRAVENOUS at 09:28

## 2021-05-28 RX ADMIN — ONDANSETRON 4 MG: 4 TABLET, ORALLY DISINTEGRATING ORAL at 18:12

## 2021-05-28 RX ADMIN — ENOXAPARIN SODIUM 40 MG: 40 INJECTION SUBCUTANEOUS at 09:28

## 2021-05-28 RX ADMIN — MORPHINE SULFATE 2 MG: 4 INJECTION, SOLUTION INTRAMUSCULAR; INTRAVENOUS at 09:29

## 2021-05-28 RX ADMIN — LIDOCAINE HYDROCHLORIDE 100 MG: 20 INJECTION, SOLUTION INFILTRATION; PERINEURAL at 07:11

## 2021-05-28 RX ADMIN — LORAZEPAM 2 MG: 2 INJECTION INTRAMUSCULAR; INTRAVENOUS at 00:53

## 2021-05-28 RX ADMIN — SODIUM CHLORIDE, POTASSIUM CHLORIDE, SODIUM LACTATE AND CALCIUM CHLORIDE: 600; 310; 30; 20 INJECTION, SOLUTION INTRAVENOUS at 21:46

## 2021-05-28 RX ADMIN — CHOLESTYRAMINE 4 G: 4 POWDER, FOR SUSPENSION ORAL at 09:29

## 2021-05-28 RX ADMIN — SODIUM CHLORIDE, PRESERVATIVE FREE 10 ML: 5 INJECTION INTRAVENOUS at 21:23

## 2021-05-28 RX ADMIN — LEVETIRACETAM 1000 MG: 100 INJECTION, SOLUTION INTRAVENOUS at 09:29

## 2021-05-28 RX ADMIN — LIDOCAINE HYDROCHLORIDE 50 MG: 20 INJECTION, SOLUTION INFILTRATION; PERINEURAL at 07:14

## 2021-05-28 RX ADMIN — CHOLESTYRAMINE 4 G: 4 POWDER, FOR SUSPENSION ORAL at 21:23

## 2021-05-28 RX ADMIN — ONDANSETRON 4 MG: 2 INJECTION INTRAMUSCULAR; INTRAVENOUS at 21:24

## 2021-05-28 RX ADMIN — HYDROXYZINE PAMOATE 25 MG: 25 CAPSULE ORAL at 21:23

## 2021-05-28 RX ADMIN — MORPHINE SULFATE 2 MG: 4 INJECTION, SOLUTION INTRAMUSCULAR; INTRAVENOUS at 05:30

## 2021-05-28 RX ADMIN — SODIUM CHLORIDE, POTASSIUM CHLORIDE, SODIUM LACTATE AND CALCIUM CHLORIDE: 600; 310; 30; 20 INJECTION, SOLUTION INTRAVENOUS at 06:39

## 2021-05-28 RX ADMIN — PROMETHAZINE HYDROCHLORIDE 12.5 MG: 25 INJECTION INTRAMUSCULAR; INTRAVENOUS at 09:42

## 2021-05-28 RX ADMIN — LEVETIRACETAM 1000 MG: 100 INJECTION, SOLUTION INTRAVENOUS at 21:33

## 2021-05-28 RX ADMIN — MORPHINE SULFATE 2 MG: 4 INJECTION, SOLUTION INTRAMUSCULAR; INTRAVENOUS at 21:23

## 2021-05-28 ASSESSMENT — PULMONARY FUNCTION TESTS
PIF_VALUE: 0

## 2021-05-28 ASSESSMENT — PAIN DESCRIPTION - FREQUENCY
FREQUENCY: CONTINUOUS
FREQUENCY: INTERMITTENT

## 2021-05-28 ASSESSMENT — PAIN - FUNCTIONAL ASSESSMENT
PAIN_FUNCTIONAL_ASSESSMENT: ACTIVITIES ARE NOT PREVENTED
PAIN_FUNCTIONAL_ASSESSMENT: 0-10
PAIN_FUNCTIONAL_ASSESSMENT: ACTIVITIES ARE NOT PREVENTED

## 2021-05-28 ASSESSMENT — PAIN DESCRIPTION - PAIN TYPE
TYPE: ACUTE PAIN
TYPE: CHRONIC PAIN
TYPE: ACUTE PAIN

## 2021-05-28 ASSESSMENT — PAIN DESCRIPTION - DESCRIPTORS
DESCRIPTORS: ACHING
DESCRIPTORS: HEADACHE

## 2021-05-28 ASSESSMENT — PAIN SCALES - GENERAL
PAINLEVEL_OUTOF10: 7
PAINLEVEL_OUTOF10: 8
PAINLEVEL_OUTOF10: 6
PAINLEVEL_OUTOF10: 6
PAINLEVEL_OUTOF10: 0
PAINLEVEL_OUTOF10: 7
PAINLEVEL_OUTOF10: 7
PAINLEVEL_OUTOF10: 0
PAINLEVEL_OUTOF10: 0
PAINLEVEL_OUTOF10: 8
PAINLEVEL_OUTOF10: 4

## 2021-05-28 ASSESSMENT — PAIN DESCRIPTION - LOCATION
LOCATION: HEAD
LOCATION: ABDOMEN

## 2021-05-28 ASSESSMENT — PAIN DESCRIPTION - ONSET
ONSET: ON-GOING
ONSET: ON-GOING

## 2021-05-28 ASSESSMENT — PAIN DESCRIPTION - ORIENTATION: ORIENTATION: ANTERIOR

## 2021-05-28 NOTE — PROGRESS NOTES
Hospitalist Progress Note      Name:  Cecil Keane /Age/Sex: 1970  (44 y.o. female)   MRN & CSN:  [id number] & [id number] Admission Date/Time: 2021 11:42 PM   Location:  [address] [place] [address] PCP: Timmy Boyd MD         Hospital Day: 3    Assessment and Plan:   Cecil Keane is a 44 y.o.  female  who presents with Nausea and vomiting     Intractable nausea and vomiting. Midepigastric abdominal pain. Biliary reflux  Mild gastritis. EGD report 21 reviewed. Plan:  Cholestyramine started by general surgeon. Continue PPI  F/u DIEGO test and biopsy result. Start diet  Continue antiemetics and analgesics as needed. 5. Morbid obesity s/p sleeve gastrectomy. General surgeon plans to perform Jet N Y later in 2021.     6. History of seizures  Continue Keppra 1000 mg IV every 12 hours (PO switched to IV)      Other chronic medical conditions:   Holding all home meds. Day team to reconcile or add IV equivalents if still n.p.o. Already converted Keppra as above. Fibromyalgia  Lupus  HTN  GERD  Depression with anxiety  FLP  SEVEN  Morbid obesity  COPD: Currently not in exacerbation  Hypothyroidism  History of gastric ulcer/AVM. None seen on EGD.      Diet DIET BARIATRIC SOFT;   DVT Prophylaxis [x] Lovenox, []  Heparin, [] SCDs, [] Ambulation   GI Prophylaxis [] PPI,  [] H2 Blocker,  [] Carafate,  [] Diet/Tube Feeds   Code Status Full Code   Disposition Home   MDM [] Low, [x] Moderate,[]  High     History of Present Illness:   Patient seen and examined in ED. She reports feeling better today. She is asking to be started on a diet. EGD done earlier today showed biliary reflux and mild gastritis. Biopsy was done. Ten point ROS reviewed negative, unless as noted above    Objective:        Intake/Output Summary (Last 24 hours) at 2021 1236  Last data filed at 2021 0928  Gross per 24 hour   Intake 220 ml   Output 900 ml   Net -680 ml      Vitals:   Vitals:    21 0023 BP: (!) 143/80   Pulse: 95   Resp: 20   Temp: 97.6 °F (36.4 °C)   SpO2: 95%     Physical Exam:   GEN Obese  female, sitting upright in bed. RESP Clear to auscultation, no wheezes, rales or rhonchi. Symmetric chest movement while on room air. CARDIO/VASC       S1/S2 auscultated. Regular rate without appreciable murmurs. No peripheral edema. GI Abdomen is soft without significant tenderness, masses, or guarding. Bowel sounds are normoactive.  No costovertebral angle tenderness. Normal appearing external genitalia. Ryder catheter is not present. MSK No gross joint deformities. SKIN Normal coloration, warm, dry. NEURO  normal speech, no lateralizing weakness. PSYCH Awake, alert, oriented x 3.     Medications:   Medications:    cholestyramine light  4 g Oral TID    levetiracetam  1,000 mg Intravenous Q12H    pantoprazole  40 mg Intravenous Daily    sodium chloride flush  5-40 mL Intravenous 2 times per day    enoxaparin  40 mg Subcutaneous Daily    ondansetron  4 mg Intravenous Q4H      Infusions:    lactated ringers 100 mL/hr at 05/28/21 0639    sodium chloride       PRN Meds: albuterol, 2.5 mg, Q6H PRN  sodium chloride flush, 5-40 mL, PRN  sodium chloride, 25 mL, PRN  polyethylene glycol, 17 g, Daily PRN  acetaminophen, 650 mg, Q6H PRN   Or  acetaminophen, 650 mg, Q6H PRN  ondansetron, 4 mg, Q8H PRN   Or  ondansetron, 4 mg, Q6H PRN  promethazine, 12.5 mg, Q6H PRN   Or  promethazine, 25 mg, Q6H PRN  morphine, 2 mg, Q3H PRN        CBC   Recent Labs     05/27/21  0041 05/28/21  0622   WBC 4.7 3.5*   HGB 11.4* 11.4*   HCT 35.5* 36.6*    171      BMP   Recent Labs     05/27/21  0041 05/28/21  0622    137   K 4.1 4.2    107   CO2 23 22   BUN 23 10   CREATININE 0.9 0.7       Radiology report reviewed     Electronically signed by Samantha Beatty MD on 5/28/2021 at 12:36 PM

## 2021-05-28 NOTE — ANESTHESIA POSTPROCEDURE EVALUATION
Department of Anesthesiology  Postprocedure Note    Patient: Sharon Ozuna  MRN: [id number]  YOB: 1970  Date of evaluation: 5/28/2021  Time:  7:26 AM     Procedure Summary     Date: 05/28/21 Room / Location: [address] [place] [address]    Anesthesia Start: 0700 Anesthesia Stop:     Procedure: EGD BIOPSY (N/A ) Diagnosis: (emesis)    Surgeons: Timothy Light MD Responsible Provider: Carlos Ybarra DO    Anesthesia Type: MAC ASA Status: 3          Anesthesia Type: No value filed. Anay Phase I:      Anay Phase II:      Last vitals: Reviewed and per EMR flowsheets.        Anesthesia Post Evaluation    Patient location during evaluation: bedside  Patient participation: complete - patient participated  Level of consciousness: awake and alert  Pain score: 0  Airway patency: patent  Nausea & Vomiting: no nausea and no vomiting  Complications: no  Cardiovascular status: hemodynamically stable and blood pressure returned to baseline  Respiratory status: acceptable, spontaneous ventilation, nonlabored ventilation and room air  Hydration status: stable

## 2021-05-28 NOTE — CARE COORDINATION
Reviewed chart, pt well known to CM from frequent admissions. Pt lives at home with her mother, ind with ADLs, has pcp/ active insurance. Anticipate discharge home with no needs, please consult CM if specific needs arise.

## 2021-05-28 NOTE — PROGRESS NOTES
GENERAL SURGERY PROGRESS NOTE    CC/HPI:           Patient feels better from IV hydration. .. awaiting her EGD this morning. Vitals:    05/27/21 1131 05/27/21 1231 05/27/21 1548 05/28/21 0034   BP: 123/66 (!) 147/80 (!) 149/71 132/66   Pulse: 71 68 79 73   Resp: 16 17 16 18   Temp:   98.2 °F (36.8 °C) 99.2 °F (37.3 °C)   TempSrc:   Oral Oral   SpO2: 97% 92% 96% 100%   Weight:       Height:         I/O last 3 completed shifts:   In: 1000 [IV Piggyback:1000]  Out: 900 [Urine:900]  I/O this shift:  In: 110 [I.V.:10; IV Piggyback:100]  Out: -     Diet NPO Effective Now Exceptions are: Ice Chips, Sips of Water with Meds    Recent Results (from the past 48 hour(s))   EKG 12 Lead    Collection Time: 05/26/21 10:49 PM   Result Value Ref Range    Ventricular Rate 75 BPM    Atrial Rate 75 BPM    P-R Interval 184 ms    QRS Duration 86 ms    Q-T Interval 382 ms    QTc Calculation (Bazett) 426 ms    P Axis 39 degrees    R Axis -16 degrees    T Axis 20 degrees    Diagnosis       Normal sinus rhythm  Minimal voltage criteria for LVH, may be normal variant  Borderline ECG  When compared with ECG of 08-MAY-2021 22:41,  No significant change was found  Confirmed by OPAL Oquendo ([id number]) on 5/27/2021 5:20:45 PM     Urinalysis    Collection Time: 05/27/21 12:01 AM   Result Value Ref Range    Color, UA YELLOW YELLOW    Clarity, UA CLEAR CLEAR    Glucose, Urine NEGATIVE NEGATIVE MG/DL    Bilirubin Urine NEGATIVE NEGATIVE MG/DL    Ketones, Urine NEGATIVE NEGATIVE MG/DL    Specific Gravity, UA 1.029 1.001 - 1.035    Blood, Urine NEGATIVE NEGATIVE    pH, Urine 5.0 5.0 - 8.0    Protein, UA NEGATIVE NEGATIVE MG/DL    Urobilinogen, Urine NEGATIVE 0.2 - 1.0 MG/DL    Nitrite Urine, Quantitative NEGATIVE NEGATIVE    Leukocyte Esterase, Urine NEGATIVE NEGATIVE    RBC, UA NONE SEEN 0 - 6 /HPF    WBC, UA NONE SEEN 0 - 5 /HPF    Bacteria, UA NEGATIVE NEGATIVE /HPF    Squam Epithel, UA 1 /HPF    Mucus, UA RARE (A) NEGATIVE HPF Source UNKNOWN     SARS-CoV-2 NOT DETECTED NOT DETECTED       Scheduled Meds:   levetiracetam  1,000 mg Intravenous Q12H    pantoprazole  40 mg Intravenous Daily    sodium chloride flush  5-40 mL Intravenous 2 times per day    enoxaparin  40 mg Subcutaneous Daily    ondansetron  4 mg Intravenous Q4H       Continuous Infusions:   sodium chloride         Physical Exam:  HEENT: Anicteric sclerae, Oropharyngeal mucosae moist, pink and intact. Heart:  Normal S1 and S2, RRR  Lungs: Clear to auscultation bilaterally, No audible Wheezes or Rales. Extremities: No edema. Neuro: Alert and Oriented x 3, Non focal.  Abdomen: Soft, Benign, Non tender, Non distended, Positive bowel sounds. Incisions: Nicely healing: No erythema, No discharge. Principal Problem:    Nausea and vomiting  Resolved Problems:    * No resolved hospital problems. *      Assessment and Plan:  Romayne Hutching is a 44 y.o. female with morbid obesity, s/p sleeve gastrectomy, in need for the RYGB, will EGD her today, to r/o abnormality to explain her symptoms, and to prepare her for the RYGB, soon. . next month. Addendum @ 7:26 AM  EGD performed and biliary reflux noted, with some MILD gastritis, will Rx Cholestyramine, and checked H-pylori. Increase Ambulation to at least 4x/day walk in the hallways with assistance. Respiratory cha-operative care: Incentive Spirometry / deep breathing and coughing 10x/hr while awake. Continue DVT prophylaxis with Teds and SCDs and SC Lovenox.   Continue GI prophylaxis with Protonix IV till able to tolerate PO.    ___________________________________________    Tori Jefferson MD, FACS, FICS  5/28/2021  5:08 AM

## 2021-05-29 LAB — CLOTEST: NEGATIVE

## 2021-05-29 PROCEDURE — C9113 INJ PANTOPRAZOLE SODIUM, VIA: HCPCS | Performed by: STUDENT IN AN ORGANIZED HEALTH CARE EDUCATION/TRAINING PROGRAM

## 2021-05-29 PROCEDURE — 6360000002 HC RX W HCPCS: Performed by: STUDENT IN AN ORGANIZED HEALTH CARE EDUCATION/TRAINING PROGRAM

## 2021-05-29 PROCEDURE — 96376 TX/PRO/DX INJ SAME DRUG ADON: CPT

## 2021-05-29 PROCEDURE — 6370000000 HC RX 637 (ALT 250 FOR IP): Performed by: INTERNAL MEDICINE

## 2021-05-29 PROCEDURE — 94761 N-INVAS EAR/PLS OXIMETRY MLT: CPT

## 2021-05-29 PROCEDURE — 96372 THER/PROPH/DIAG INJ SC/IM: CPT

## 2021-05-29 PROCEDURE — 6360000002 HC RX W HCPCS: Performed by: INTERNAL MEDICINE

## 2021-05-29 PROCEDURE — 96375 TX/PRO/DX INJ NEW DRUG ADDON: CPT

## 2021-05-29 PROCEDURE — 6360000002 HC RX W HCPCS: Performed by: SURGERY

## 2021-05-29 PROCEDURE — 96366 THER/PROPH/DIAG IV INF ADDON: CPT

## 2021-05-29 PROCEDURE — 6370000000 HC RX 637 (ALT 250 FOR IP): Performed by: SURGERY

## 2021-05-29 PROCEDURE — G0378 HOSPITAL OBSERVATION PER HR: HCPCS

## 2021-05-29 PROCEDURE — 2580000003 HC RX 258: Performed by: STUDENT IN AN ORGANIZED HEALTH CARE EDUCATION/TRAINING PROGRAM

## 2021-05-29 PROCEDURE — 2580000003 HC RX 258: Performed by: ANESTHESIOLOGY

## 2021-05-29 RX ORDER — PAROXETINE HYDROCHLORIDE 20 MG/1
30 TABLET, FILM COATED ORAL NIGHTLY
Status: DISCONTINUED | OUTPATIENT
Start: 2021-05-29 | End: 2021-05-30 | Stop reason: HOSPADM

## 2021-05-29 RX ORDER — ESTRADIOL 1 MG/1
1 TABLET ORAL DAILY
Status: DISCONTINUED | OUTPATIENT
Start: 2021-05-29 | End: 2021-05-30 | Stop reason: HOSPADM

## 2021-05-29 RX ORDER — ATENOLOL 25 MG/1
25 TABLET ORAL DAILY
Status: DISCONTINUED | OUTPATIENT
Start: 2021-05-29 | End: 2021-05-30 | Stop reason: HOSPADM

## 2021-05-29 RX ORDER — GABAPENTIN 400 MG/1
800 CAPSULE ORAL 3 TIMES DAILY
Status: DISCONTINUED | OUTPATIENT
Start: 2021-05-29 | End: 2021-05-30 | Stop reason: HOSPADM

## 2021-05-29 RX ORDER — TIZANIDINE 4 MG/1
4 TABLET ORAL EVERY 8 HOURS PRN
Status: DISCONTINUED | OUTPATIENT
Start: 2021-05-29 | End: 2021-05-30 | Stop reason: HOSPADM

## 2021-05-29 RX ADMIN — GABAPENTIN 800 MG: 400 CAPSULE ORAL at 08:43

## 2021-05-29 RX ADMIN — PROMETHAZINE HYDROCHLORIDE 12.5 MG: 25 INJECTION INTRAMUSCULAR; INTRAVENOUS at 02:41

## 2021-05-29 RX ADMIN — ONDANSETRON 4 MG: 2 INJECTION INTRAMUSCULAR; INTRAVENOUS at 15:12

## 2021-05-29 RX ADMIN — CHOLESTYRAMINE 4 G: 4 POWDER, FOR SUSPENSION ORAL at 15:11

## 2021-05-29 RX ADMIN — MORPHINE SULFATE 2 MG: 4 INJECTION, SOLUTION INTRAMUSCULAR; INTRAVENOUS at 05:31

## 2021-05-29 RX ADMIN — MORPHINE SULFATE 2 MG: 4 INJECTION, SOLUTION INTRAMUSCULAR; INTRAVENOUS at 15:11

## 2021-05-29 RX ADMIN — PROMETHAZINE HYDROCHLORIDE 12.5 MG: 25 INJECTION INTRAMUSCULAR; INTRAVENOUS at 18:47

## 2021-05-29 RX ADMIN — SODIUM CHLORIDE, PRESERVATIVE FREE 10 ML: 5 INJECTION INTRAVENOUS at 20:35

## 2021-05-29 RX ADMIN — LEVOTHYROXINE SODIUM 125 MCG: 25 TABLET ORAL at 08:43

## 2021-05-29 RX ADMIN — ONDANSETRON 4 MG: 2 INJECTION INTRAMUSCULAR; INTRAVENOUS at 04:47

## 2021-05-29 RX ADMIN — MORPHINE SULFATE 2 MG: 4 INJECTION, SOLUTION INTRAMUSCULAR; INTRAVENOUS at 11:30

## 2021-05-29 RX ADMIN — PAROXETINE HYDROCHLORIDE 30 MG: 20 TABLET, FILM COATED ORAL at 20:35

## 2021-05-29 RX ADMIN — ONDANSETRON 4 MG: 2 INJECTION INTRAMUSCULAR; INTRAVENOUS at 20:34

## 2021-05-29 RX ADMIN — ENOXAPARIN SODIUM 40 MG: 40 INJECTION SUBCUTANEOUS at 08:32

## 2021-05-29 RX ADMIN — MORPHINE SULFATE 2 MG: 4 INJECTION, SOLUTION INTRAMUSCULAR; INTRAVENOUS at 08:34

## 2021-05-29 RX ADMIN — PANTOPRAZOLE SODIUM 40 MG: 40 INJECTION, POWDER, FOR SOLUTION INTRAVENOUS at 08:33

## 2021-05-29 RX ADMIN — SODIUM CHLORIDE, POTASSIUM CHLORIDE, SODIUM LACTATE AND CALCIUM CHLORIDE: 600; 310; 30; 20 INJECTION, SOLUTION INTRAVENOUS at 08:33

## 2021-05-29 RX ADMIN — LEVETIRACETAM 1000 MG: 100 INJECTION, SOLUTION INTRAVENOUS at 20:36

## 2021-05-29 RX ADMIN — TIZANIDINE 4 MG: 4 TABLET ORAL at 15:11

## 2021-05-29 RX ADMIN — ONDANSETRON 4 MG: 2 INJECTION INTRAMUSCULAR; INTRAVENOUS at 08:33

## 2021-05-29 RX ADMIN — CHOLESTYRAMINE 4 G: 4 POWDER, FOR SUSPENSION ORAL at 20:35

## 2021-05-29 RX ADMIN — HYDROXYZINE PAMOATE 25 MG: 25 CAPSULE ORAL at 22:04

## 2021-05-29 RX ADMIN — MORPHINE SULFATE 2 MG: 4 INJECTION, SOLUTION INTRAMUSCULAR; INTRAVENOUS at 18:47

## 2021-05-29 RX ADMIN — GABAPENTIN 800 MG: 400 CAPSULE ORAL at 20:35

## 2021-05-29 RX ADMIN — ONDANSETRON 4 MG: 2 INJECTION INTRAMUSCULAR; INTRAVENOUS at 01:45

## 2021-05-29 RX ADMIN — HYDROXYZINE PAMOATE 25 MG: 25 CAPSULE ORAL at 04:24

## 2021-05-29 RX ADMIN — LEVETIRACETAM 1000 MG: 100 INJECTION, SOLUTION INTRAVENOUS at 08:33

## 2021-05-29 RX ADMIN — GABAPENTIN 800 MG: 400 CAPSULE ORAL at 15:11

## 2021-05-29 RX ADMIN — ATENOLOL 25 MG: 25 TABLET ORAL at 08:43

## 2021-05-29 RX ADMIN — CHOLESTYRAMINE 4 G: 4 POWDER, FOR SUSPENSION ORAL at 08:32

## 2021-05-29 RX ADMIN — MORPHINE SULFATE 2 MG: 4 INJECTION, SOLUTION INTRAMUSCULAR; INTRAVENOUS at 22:04

## 2021-05-29 RX ADMIN — HYDROXYZINE PAMOATE 25 MG: 25 CAPSULE ORAL at 11:30

## 2021-05-29 RX ADMIN — PROMETHAZINE HYDROCHLORIDE 12.5 MG: 25 INJECTION INTRAMUSCULAR; INTRAVENOUS at 11:30

## 2021-05-29 ASSESSMENT — PAIN DESCRIPTION - PAIN TYPE
TYPE: ACUTE PAIN

## 2021-05-29 ASSESSMENT — PAIN DESCRIPTION - DESCRIPTORS
DESCRIPTORS: ACHING;CONSTANT;DISCOMFORT
DESCRIPTORS: DISCOMFORT;ACHING
DESCRIPTORS: ACHING;DISCOMFORT

## 2021-05-29 ASSESSMENT — PAIN DESCRIPTION - LOCATION
LOCATION: ABDOMEN

## 2021-05-29 ASSESSMENT — PAIN DESCRIPTION - FREQUENCY
FREQUENCY: INTERMITTENT
FREQUENCY: INTERMITTENT
FREQUENCY: CONTINUOUS

## 2021-05-29 ASSESSMENT — PAIN SCALES - GENERAL
PAINLEVEL_OUTOF10: 0
PAINLEVEL_OUTOF10: 8
PAINLEVEL_OUTOF10: 5
PAINLEVEL_OUTOF10: 7
PAINLEVEL_OUTOF10: 5
PAINLEVEL_OUTOF10: 8
PAINLEVEL_OUTOF10: 7
PAINLEVEL_OUTOF10: 0
PAINLEVEL_OUTOF10: 7

## 2021-05-29 ASSESSMENT — PAIN DESCRIPTION - ONSET
ONSET: ON-GOING

## 2021-05-29 ASSESSMENT — PAIN DESCRIPTION - PROGRESSION: CLINICAL_PROGRESSION: NOT CHANGED

## 2021-05-29 ASSESSMENT — PAIN DESCRIPTION - ORIENTATION: ORIENTATION: ANTERIOR

## 2021-05-29 NOTE — PROGRESS NOTES
Hospitalist Progress Note      Name:  Ivan Alvarez /Age/Sex: 1970  (44 y.o. female)   MRN & CSN:  [id number] & [id number] Admission Date/Time: 2021 11:42 PM   Location:  [address] [place] [address] PCP: Tracy James MD         Hospital Day: 4    Assessment and Plan:   Ivan Alvarez is a 44 y.o.  female  who presents with Nausea and vomiting     Intractable nausea and vomiting: Improving  Midepigastric abdominal pain. Biliary reflux  Mild gastritis. EGD report 21 showed mild gastritis and biliary reflux. DIEGO test negative. Plan:  Continue cholestyramine. Continue PPI  F/u biopsy result. Continue regular diet  Continue antiemetics and analgesics as needed. DC IV fluids. 5. Morbid obesity s/p sleeve gastrectomy. General surgeon plans to perform Jet N Y later in 2021.     6. History of seizures  Continue Keppra 1000 mg IV every 12 hours (PO switched to IV)      Other chronic medical conditions:  Fibromyalgia-Zanaflex  Lupus  HTN-atenolol  GERD-PPI  Depression with anxiety-paroxetine  FLP  SEVEN  Morbid obesity  COPD: Currently not in exacerbation  Hypothyroidism-Synthroid  History of gastric ulcer/AVM. Not seen on EGD 2021.      Diet DIET GENERAL;   DVT Prophylaxis [x] Lovenox, []  Heparin, [] SCDs, [] Ambulation   GI Prophylaxis [] PPI,  [] H2 Blocker,  [] Carafate,  [] Diet/Tube Feeds   Code Status Full Code   Disposition Home tomorrow   MDM [] Low, [x] Moderate,[]  High     History of Present Illness:   Patient seen and examined in ED. She reports feeling better today. She did not eat much yesterday. She would like to eat more today and hopefully can go home tomorrow. Ten point ROS reviewed negative, unless as noted above    Objective:        Intake/Output Summary (Last 24 hours) at 2021 0955  Last data filed at 2021 0510  Gross per 24 hour   Intake 1265 ml   Output 800 ml   Net 465 ml      Vitals:   Vitals:    21 0830   BP: 126/78   Pulse: 59   Resp: 18 Temp: 97.7 °F (36.5 °C)   SpO2: 100%     Physical Exam:   GEN Obese  female, sitting upright in bed. RESP Clear to auscultation, no wheezes, rales or rhonchi. Symmetric chest movement while on room air. CARDIO/VASC       S1/S2 auscultated. Regular rate without appreciable murmurs. No peripheral edema. GI Abdomen is soft without significant tenderness, masses, or guarding. Bowel sounds are normoactive.  No costovertebral angle tenderness. Normal appearing external genitalia. Ryder catheter is not present. MSK No gross joint deformities. SKIN Normal coloration, warm, dry. NEURO  normal speech, no lateralizing weakness. PSYCH Awake, alert, oriented x 3.     Medications:   Medications:    levothyroxine  125 mcg Oral Daily    gabapentin  800 mg Oral TID    PARoxetine  30 mg Oral Nightly    estradiol  1 mg Oral Daily    atenolol  25 mg Oral Daily    cholestyramine light  4 g Oral TID    levetiracetam  1,000 mg Intravenous Q12H    pantoprazole  40 mg Intravenous Daily    sodium chloride flush  5-40 mL Intravenous 2 times per day    enoxaparin  40 mg Subcutaneous Daily    ondansetron  4 mg Intravenous Q4H      Infusions:    lactated ringers 100 mL/hr at 05/29/21 0833    sodium chloride       PRN Meds: tiZANidine, 4 mg, Q8H PRN  hydrOXYzine, 25 mg, TID PRN  albuterol, 2.5 mg, Q6H PRN  sodium chloride flush, 5-40 mL, PRN  sodium chloride, 25 mL, PRN  polyethylene glycol, 17 g, Daily PRN  acetaminophen, 650 mg, Q6H PRN   Or  acetaminophen, 650 mg, Q6H PRN  ondansetron, 4 mg, Q8H PRN   Or  ondansetron, 4 mg, Q6H PRN  promethazine, 12.5 mg, Q6H PRN   Or  promethazine, 25 mg, Q6H PRN  morphine, 2 mg, Q3H PRN        CBC   Recent Labs     05/27/21  0041 05/28/21  0622   WBC 4.7 3.5*   HGB 11.4* 11.4*   HCT 35.5* 36.6*    171      BMP   Recent Labs     05/27/21  0041 05/28/21  0622    137   K 4.1 4.2    107   CO2 23 22   BUN 23 10   CREATININE 0.9 0.7       Radiology report reviewed     Electronically signed by Duran Linton MD on 5/29/2021 at 9:55 AM

## 2021-05-30 VITALS
WEIGHT: 279 LBS | HEART RATE: 51 BPM | OXYGEN SATURATION: 100 % | BODY MASS INDEX: 47.63 KG/M2 | TEMPERATURE: 97.7 F | RESPIRATION RATE: 16 BRPM | SYSTOLIC BLOOD PRESSURE: 117 MMHG | DIASTOLIC BLOOD PRESSURE: 73 MMHG | HEIGHT: 64 IN

## 2021-05-30 PROCEDURE — G0378 HOSPITAL OBSERVATION PER HR: HCPCS

## 2021-05-30 PROCEDURE — 6360000002 HC RX W HCPCS: Performed by: STUDENT IN AN ORGANIZED HEALTH CARE EDUCATION/TRAINING PROGRAM

## 2021-05-30 PROCEDURE — 6360000002 HC RX W HCPCS: Performed by: INTERNAL MEDICINE

## 2021-05-30 PROCEDURE — 6360000002 HC RX W HCPCS: Performed by: SURGERY

## 2021-05-30 PROCEDURE — 6370000000 HC RX 637 (ALT 250 FOR IP): Performed by: INTERNAL MEDICINE

## 2021-05-30 PROCEDURE — C9113 INJ PANTOPRAZOLE SODIUM, VIA: HCPCS | Performed by: STUDENT IN AN ORGANIZED HEALTH CARE EDUCATION/TRAINING PROGRAM

## 2021-05-30 PROCEDURE — 2580000003 HC RX 258: Performed by: STUDENT IN AN ORGANIZED HEALTH CARE EDUCATION/TRAINING PROGRAM

## 2021-05-30 PROCEDURE — 6370000000 HC RX 637 (ALT 250 FOR IP): Performed by: SURGERY

## 2021-05-30 PROCEDURE — 94761 N-INVAS EAR/PLS OXIMETRY MLT: CPT

## 2021-05-30 PROCEDURE — 96372 THER/PROPH/DIAG INJ SC/IM: CPT

## 2021-05-30 PROCEDURE — 96376 TX/PRO/DX INJ SAME DRUG ADON: CPT

## 2021-05-30 RX ORDER — PROMETHAZINE HYDROCHLORIDE 25 MG/1
25 TABLET ORAL 4 TIMES DAILY PRN
Qty: 20 TABLET | Refills: 0 | Status: SHIPPED | OUTPATIENT
Start: 2021-05-30 | End: 2021-06-06

## 2021-05-30 RX ORDER — HEPARIN SODIUM (PORCINE) LOCK FLUSH IV SOLN 100 UNIT/ML 100 UNIT/ML
100 SOLUTION INTRAVENOUS ONCE
Status: COMPLETED | OUTPATIENT
Start: 2021-05-30 | End: 2021-05-30

## 2021-05-30 RX ORDER — CHOLESTYRAMINE LIGHT 4 G/5.7G
4 POWDER, FOR SUSPENSION ORAL 3 TIMES DAILY
Qty: 60 PACKET | Refills: 3 | Status: ON HOLD | OUTPATIENT
Start: 2021-05-30 | End: 2021-08-18 | Stop reason: HOSPADM

## 2021-05-30 RX ADMIN — ESTRADIOL 1 MG: 1 TABLET ORAL at 08:04

## 2021-05-30 RX ADMIN — PROMETHAZINE HYDROCHLORIDE 12.5 MG: 25 INJECTION INTRAMUSCULAR; INTRAVENOUS at 03:55

## 2021-05-30 RX ADMIN — ONDANSETRON 4 MG: 2 INJECTION INTRAMUSCULAR; INTRAVENOUS at 05:58

## 2021-05-30 RX ADMIN — MORPHINE SULFATE 2 MG: 4 INJECTION, SOLUTION INTRAMUSCULAR; INTRAVENOUS at 10:52

## 2021-05-30 RX ADMIN — ATENOLOL 25 MG: 25 TABLET ORAL at 08:04

## 2021-05-30 RX ADMIN — ENOXAPARIN SODIUM 40 MG: 40 INJECTION SUBCUTANEOUS at 08:04

## 2021-05-30 RX ADMIN — GABAPENTIN 800 MG: 400 CAPSULE ORAL at 08:04

## 2021-05-30 RX ADMIN — PROMETHAZINE HYDROCHLORIDE 12.5 MG: 25 INJECTION INTRAMUSCULAR; INTRAVENOUS at 10:53

## 2021-05-30 RX ADMIN — PANTOPRAZOLE SODIUM 40 MG: 40 INJECTION, POWDER, FOR SOLUTION INTRAVENOUS at 08:04

## 2021-05-30 RX ADMIN — Medication 100 UNITS: at 17:37

## 2021-05-30 RX ADMIN — LEVETIRACETAM 1000 MG: 100 INJECTION, SOLUTION INTRAVENOUS at 08:04

## 2021-05-30 RX ADMIN — MORPHINE SULFATE 2 MG: 4 INJECTION, SOLUTION INTRAMUSCULAR; INTRAVENOUS at 14:11

## 2021-05-30 RX ADMIN — CHOLESTYRAMINE 4 G: 4 POWDER, FOR SUSPENSION ORAL at 08:04

## 2021-05-30 RX ADMIN — ONDANSETRON 4 MG: 2 INJECTION INTRAMUSCULAR; INTRAVENOUS at 14:11

## 2021-05-30 RX ADMIN — MORPHINE SULFATE 2 MG: 4 INJECTION, SOLUTION INTRAMUSCULAR; INTRAVENOUS at 03:55

## 2021-05-30 RX ADMIN — MORPHINE SULFATE 2 MG: 4 INJECTION, SOLUTION INTRAMUSCULAR; INTRAVENOUS at 17:37

## 2021-05-30 RX ADMIN — SODIUM CHLORIDE, PRESERVATIVE FREE 10 ML: 5 INJECTION INTRAVENOUS at 08:14

## 2021-05-30 RX ADMIN — ONDANSETRON 4 MG: 2 INJECTION INTRAMUSCULAR; INTRAVENOUS at 02:25

## 2021-05-30 RX ADMIN — HYDROXYZINE PAMOATE 25 MG: 25 CAPSULE ORAL at 10:53

## 2021-05-30 RX ADMIN — CHOLESTYRAMINE 4 G: 4 POWDER, FOR SUSPENSION ORAL at 14:11

## 2021-05-30 RX ADMIN — TIZANIDINE 4 MG: 4 TABLET ORAL at 14:11

## 2021-05-30 RX ADMIN — MORPHINE SULFATE 2 MG: 4 INJECTION, SOLUTION INTRAMUSCULAR; INTRAVENOUS at 07:37

## 2021-05-30 RX ADMIN — ONDANSETRON 4 MG: 2 INJECTION INTRAMUSCULAR; INTRAVENOUS at 08:05

## 2021-05-30 RX ADMIN — ONDANSETRON 4 MG: 2 INJECTION INTRAMUSCULAR; INTRAVENOUS at 17:37

## 2021-05-30 RX ADMIN — TIZANIDINE 4 MG: 4 TABLET ORAL at 03:58

## 2021-05-30 RX ADMIN — LEVOTHYROXINE SODIUM 125 MCG: 25 TABLET ORAL at 05:57

## 2021-05-30 RX ADMIN — GABAPENTIN 800 MG: 400 CAPSULE ORAL at 14:11

## 2021-05-30 ASSESSMENT — PAIN DESCRIPTION - ORIENTATION
ORIENTATION: ANTERIOR
ORIENTATION: ANTERIOR

## 2021-05-30 ASSESSMENT — PAIN - FUNCTIONAL ASSESSMENT: PAIN_FUNCTIONAL_ASSESSMENT: ACTIVITIES ARE NOT PREVENTED

## 2021-05-30 ASSESSMENT — PAIN DESCRIPTION - ONSET
ONSET: ON-GOING
ONSET: ON-GOING

## 2021-05-30 ASSESSMENT — PAIN SCALES - GENERAL
PAINLEVEL_OUTOF10: 0
PAINLEVEL_OUTOF10: 7
PAINLEVEL_OUTOF10: 6
PAINLEVEL_OUTOF10: 0
PAINLEVEL_OUTOF10: 6
PAINLEVEL_OUTOF10: 0
PAINLEVEL_OUTOF10: 7

## 2021-05-30 ASSESSMENT — PAIN DESCRIPTION - FREQUENCY
FREQUENCY: INTERMITTENT
FREQUENCY: CONTINUOUS

## 2021-05-30 ASSESSMENT — PAIN DESCRIPTION - DESCRIPTORS
DESCRIPTORS: CONSTANT;DISCOMFORT
DESCRIPTORS: DISCOMFORT

## 2021-05-30 ASSESSMENT — PAIN DESCRIPTION - LOCATION
LOCATION: ABDOMEN

## 2021-05-30 ASSESSMENT — PAIN DESCRIPTION - PAIN TYPE
TYPE: ACUTE PAIN

## 2021-05-30 ASSESSMENT — PAIN DESCRIPTION - PROGRESSION: CLINICAL_PROGRESSION: NOT CHANGED

## 2021-05-30 NOTE — DISCHARGE SUMMARY
Discharge Summary    Name:  Amber Gomez /Age/Sex: 1970  (44 y.o. female)   MRN & CSN:  [id number] & [id number] Admission Date/Time: 2021 11:42 PM   Attending:  Vanessa Mcclendon MD Discharging Physician: Vanessa Mcclendon MD     Hospital Course:   Delfino Pope is a 44 y. o.  female  who presents with Nausea and vomiting. This is a recurrent problem.     1. Intractable nausea and vomiting: Improving at discharge. 2. Midepigastric abdominal pain. 3. Biliary reflux noted on EGD  4. Mild gastritis noted on EGD  EGD report 21 showed mild gastritis and biliary reflux. DIEGO test negative. Her symptoms were controlled with antiemetics as needed. She was started on cholestyramine based on EGD findings and continued on PPI. General surgeon thinks her symptoms will improve after she gets Jet-en-Y surgery planned for sometime in the next week or two. Symptoms are manageable at this time and she feels she can continue on oral antiemetics at home. She is tolerating regular diet.     5. Morbid obesity s/p sleeve gastrectomy. General surgeon plans to perform Jet N Y later in 2021. Other chronic medical conditions:  · Fibromyalgia-Zanaflex  · Lupus  · HTN-atenolol  · GERD-PPI  · Depression with anxiety-paroxetine  · FLP  · SEVEN  · Morbid obesity  · COPD: Currently not in exacerbation  · Hypothyroidism-Synthroid  · History of gastric ulcer/AVM. Not seen on EGD 2021. · History of seizures-on Keppra    The patient expressed appropriate understanding of and agreement with the discharge recommendations, medications, and plan. Consults this admission:  IP CONSULT TO HOSPITALIST  IP CONSULT TO GENERAL SURGERY    Discharge Instruction:   Nayely Guerra MD. Schedule an appointment as soon as possible for a   visit in 1 week.     Specialty: General Surgery  Contact information:  [address]  [phone] Diet:  regular diet   Activity: activity as tolerated  Disposition: Discharged to:   [x]Home, [][facility], []SNF, []Acute Rehab, []Hospice   Condition on discharge: Stable    Discharge Medications:      Toshia Avi Jalloh Medication Instructions MARINE:[id number]    Printed on:05/30/21 1119   Medication Information                      albuterol (PROVENTIL) (2.5 MG/3ML) 0.083% nebulizer solution  Take 3 mLs by nebulization every 6 hours as needed for Wheezing             albuterol sulfate  (90 Base) MCG/ACT inhaler  Inhale 2 puffs into the lungs 4 times daily             atenolol (TENORMIN) 25 MG tablet  Take 1 tablet by mouth daily             betamethasone valerate (VALISONE) 0.1 % ointment  APPLY OINTMENT TO AFFECTED AREA TWICE DAILY TO HANDS AND ELBOWS FOR 21 DAYS             Calcium Citrate-Vitamin D (CALCIUM + VIT D, BARIATRIC ADVANTAGE, CHEWABLE TABLET)  Take 1 tablet by mouth 2 times daily             Cholecalciferol (VITAMIN D3) 5000 units TABS  Take 1 tablet by mouth daily             cholestyramine light 4 g packet  Take 1 packet by mouth 3 times daily             cloNIDine (CATAPRES) 0.1 MG tablet  Take 1 tablet by mouth 2 times daily             Cyanocobalamin (VITAMIN B 12 PO)  Take 1 tablet by mouth daily             dicyclomine (BENTYL) 10 MG capsule  Take 1 capsule by mouth 3 times daily as needed (abdominal pain)             EQ PINK-BISMUTH 262 MG chewable tablet  CHEW & SWALLOW 2 TABLETS BY MOUTH 4 TIMES DAILY BEFORE MEAL(S) AND NIGHTLY FOR 5 DAYS             estradiol (ESTRACE) 0.5 MG tablet  Take 1 tablet by mouth daily             ferrous sulfate (IRON 325) 325 (65 Fe) MG tablet  Take 1 tablet by mouth daily (with breakfast)             gabapentin (NEURONTIN) 800 MG tablet  Take 800 mg by mouth 3 times daily             hydrOXYzine (VISTARIL) 50 MG capsule  Take 1 capsule by mouth every 6 hours as needed for Anxiety             levETIRAcetam (KEPPRA) 1000 MG tablet  Take 1 tablet by mouth 2 times daily             levothyroxine (SYNTHROID) 125 MCG tablet  Take 1 tablet by mouth Daily             meclizine (ANTIVERT) 25 MG tablet  Take 25 mg by mouth 3 times daily as needed for Dizziness             Melatonin 10 MG TABS  Take 10-20 mg by mouth nightly as needed             Multiple Vitamin (MVI, BARIATRIC ADVANTAGE MULTI-FORMULA, CHEW TAB)  Take 1 tablet by mouth daily             ondansetron (ZOFRAN-ODT) 4 MG disintegrating tablet  DISSOLVE 1 TABLET IN MOUTH EVERY 8 HOURS AS NEEDED FOR NAUSEA OR VOMITING             oxyCODONE-acetaminophen (PERCOCET) 5-325 MG per tablet  Take 1 tablet by mouth. Every 4-6 hours PRN             pantoprazole (PROTONIX) 40 MG tablet  Take 1 tablet by mouth daily (with breakfast)             PARoxetine (PAXIL) 30 MG tablet  Take 1.5 tablets by mouth nightly             potassium gluconate 550 mg tablet  Take 1 tablet by mouth daily             promethazine (PHENERGAN) 25 MG tablet  Take 1 tablet by mouth 4 times daily as needed for Nausea             scopolamine (TRANSDERM-SCOP) transdermal patch  APPLY 1 PATCH TRANSDERMALLY TO THE SKIN BEHIND THE EAR ONCE EVERY 3 DAYS AS NEEDED             tiZANidine (ZANAFLEX) 4 MG tablet  Take 4 mg by mouth every 8 hours as needed                  Objective Findings at Discharge:   /64   Pulse 61   Temp 98.3 °F (36.8 °C) (Oral)   Resp 18   Ht 5' 4\" (1.626 m)   Wt 279 lb (126.6 kg)   SpO2 95%   BMI 47.89 kg/m²            PHYSICAL EXAM  GEN    Obese  female, sitting upright in bed. RESP  Clear to auscultation, no wheezes, rales or rhonchi. Symmetric chest movement while on room air. CARDIO/VASC       S1/S2 auscultated. Regular rate without appreciable murmurs. No peripheral edema. GI        Abdomen is soft without significant tenderness, masses, or guarding. Bowel sounds are normoactive. MSK    No gross joint deformities. SKIN    Normal coloration, warm, dry.   NEURO            normal speech, no lateralizing weakness. PSYCH            Awake, alert, oriented x 3. BMP/CBC  Recent Labs     05/28/21  0622      K 4.2      CO2 22   BUN 10   CREATININE 0.7   WBC 3.5*   HCT 36.6*          IMAGING:  CT abd/pel  1. No acute intra-abdominal abnormality. 2. Cholecystectomy, hysterectomy, and sleeve gastrectomy. 3. 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 EGD 5/28 -  biliary reflux noted, with some MILD gastritis.     Discharge Time of 25 minutes    Electronically signed by Ethan Wilson MD on 5/30/2021 at 11:13 AM

## 2021-06-03 ENCOUNTER — OFFICE VISIT (OUTPATIENT)
Dept: CARDIOLOGY CLINIC | Age: 51
End: 2021-06-03
Payer: COMMERCIAL

## 2021-06-03 ENCOUNTER — TELEPHONE (OUTPATIENT)
Dept: INFUSION THERAPY | Age: 51
End: 2021-06-03

## 2021-06-03 VITALS
BODY MASS INDEX: 47.12 KG/M2 | HEART RATE: 88 BPM | WEIGHT: 276 LBS | SYSTOLIC BLOOD PRESSURE: 120 MMHG | DIASTOLIC BLOOD PRESSURE: 82 MMHG | HEIGHT: 64 IN

## 2021-06-03 DIAGNOSIS — Z01.818 PRE-OPERATIVE CLEARANCE: ICD-10-CM

## 2021-06-03 DIAGNOSIS — E66.01 MORBID OBESITY WITH BMI OF 45.0-49.9, ADULT (HCC): ICD-10-CM

## 2021-06-03 DIAGNOSIS — I10 ESSENTIAL HYPERTENSION: Primary | ICD-10-CM

## 2021-06-03 PROBLEM — R10.9 ABDOMINAL PAIN: Status: RESOLVED | Noted: 2020-07-21 | Resolved: 2021-06-03

## 2021-06-03 PROBLEM — R11.2 NAUSEA AND VOMITING: Status: RESOLVED | Noted: 2021-05-09 | Resolved: 2021-06-03

## 2021-06-03 PROBLEM — R11.10 INTRACTABLE VOMITING: Status: RESOLVED | Noted: 2020-10-17 | Resolved: 2021-06-03

## 2021-06-03 PROBLEM — Z76.89 ENCOUNTER FOR WEIGHT MANAGEMENT: Status: RESOLVED | Noted: 2020-06-26 | Resolved: 2021-06-03

## 2021-06-03 PROBLEM — R11.2 INTRACTABLE NAUSEA AND VOMITING: Status: RESOLVED | Noted: 2020-07-03 | Resolved: 2021-06-03

## 2021-06-03 PROBLEM — R11.2 INTRACTABLE VOMITING WITH NAUSEA: Status: RESOLVED | Noted: 2018-07-21 | Resolved: 2021-06-03

## 2021-06-03 PROCEDURE — 1036F TOBACCO NON-USER: CPT | Performed by: NURSE PRACTITIONER

## 2021-06-03 PROCEDURE — G8427 DOCREV CUR MEDS BY ELIG CLIN: HCPCS | Performed by: NURSE PRACTITIONER

## 2021-06-03 PROCEDURE — 1111F DSCHRG MED/CURRENT MED MERGE: CPT | Performed by: NURSE PRACTITIONER

## 2021-06-03 PROCEDURE — 99214 OFFICE O/P EST MOD 30 MIN: CPT | Performed by: NURSE PRACTITIONER

## 2021-06-03 PROCEDURE — 3017F COLORECTAL CA SCREEN DOC REV: CPT | Performed by: NURSE PRACTITIONER

## 2021-06-03 PROCEDURE — G8417 CALC BMI ABV UP PARAM F/U: HCPCS | Performed by: NURSE PRACTITIONER

## 2021-06-03 ASSESSMENT — ENCOUNTER SYMPTOMS
ABDOMINAL PAIN: 1
NAUSEA: 1
VOMITING: 1

## 2021-06-03 NOTE — TELEPHONE ENCOUNTER
Returned call to pt & added her for an OV on 6/11 with Hui Lopes as pt has been in the hospital. Pt has been approved for her iron tx's but not able to schedule due to being in hospitalized. Pt states she received iron while in the hospital but hasn't had iron labs drawn since April; per NN OV made for f/u before scheduling iron txs. Pt voiced understanding.

## 2021-06-03 NOTE — PROGRESS NOTES
[place] ([facility] [place] PHYSICAL [facility]  [address]  Phone: [phone]    Fax [phone]                  Tiffanie Girard MD, Beckie Hatch MD, Guanakito Fountain MD, MD Mai Watson MD Jeannene Lasso, MD Caretha Iha, APRN      Luiza Escoto, APRN  Jose Carlos Cortze, APRN     Juni Ahmadi, APRN        Cardiology Progress Note      6/3/2021    RE: Maryann Pedroza  (1970)                             Primary cardiologist: Dr. Mai Mack       Subjective:  CC:   1. Essential hypertension    2. Morbid obesity with BMI of 45.0-49.9, adult ([facility]    3. Pre-operative clearance        HPI: Maryann Pedroza, who is a  44y.o. year old female with a past medical history as listed below. Patient presents to the office for follow up on HTN and morbid obesity. Patient has had multiple hospitalizations due to gastric complications. Patient also has lupus. Patient is scheduled for Jet-en-Y. patient is  an active female who walks regularly. Patient is  compliant with medications. Patient denies any chest pain, shortness of breath, dizziness, syncope, or palpitations. Past Medical History:   Diagnosis Date    Acid reflux     Anemia     Anxiety     Arthritis     Hands, Back And Ankles    Bleeding ulcer 2014    \"I Had Ulcers In My Stomach And Colon\"/ per pt on 8/12/2019\"they said recently having some blood in my stomach- in July ( 2019)could not find where coming from \"    Bronchitis Last Episode 2014    Chronic back pain     Chronic pain     Sees Dr. Dona Foster COPD (chronic obstructive pulmonary disease) ([facility]     Sees Dr. Libra Robles Depression     Fibromyalgia Dx 2013    GERD (gastroesophageal reflux disease)     H/O echocardiogram 08/11/2015    EF >55%. LA to be at the upper limit of normal in size. LV hypertrophy with normal LV systolic, but abnormal diastolic function.  Normal valvular structures by mouth nightly as needed      Cyanocobalamin (VITAMIN B 12 PO) Take 1 tablet by mouth daily      meclizine (ANTIVERT) 25 MG tablet Take 25 mg by mouth 3 times daily as needed for Dizziness      dicyclomine (BENTYL) 10 MG capsule Take 1 capsule by mouth 3 times daily as needed (abdominal pain) 21 capsule 3    hydrOXYzine (VISTARIL) 50 MG capsule Take 1 capsule by mouth every 6 hours as needed for Anxiety 30 capsule 3    levETIRAcetam (KEPPRA) 1000 MG tablet Take 1 tablet by mouth 2 times daily 60 tablet 3    levothyroxine (SYNTHROID) 125 MCG tablet Take 1 tablet by mouth Daily 30 tablet 3    atenolol (TENORMIN) 25 MG tablet Take 1 tablet by mouth daily 30 tablet 3    cloNIDine (CATAPRES) 0.1 MG tablet Take 1 tablet by mouth 2 times daily (Patient taking differently: Take 0.1 mg by mouth 2 times daily as needed for High Blood Pressure 05/27/21 Patient states she only takes as needed usually if SBP>150) 60 tablet 3    estradiol (ESTRACE) 0.5 MG tablet Take 1 tablet by mouth daily (Patient taking differently: Take 0.5 mg by mouth nightly ) 21 tablet 3    PARoxetine (PAXIL) 30 MG tablet Take 1.5 tablets by mouth nightly (Patient taking differently: Take 30 mg by mouth nightly ) 30 tablet 3    pantoprazole (PROTONIX) 40 MG tablet Take 1 tablet by mouth daily (with breakfast) 30 tablet 3    albuterol (PROVENTIL) (2.5 MG/3ML) 0.083% nebulizer solution Take 3 mLs by nebulization every 6 hours as needed for Wheezing 120 each 0    betamethasone valerate (VALISONE) 0.1 % ointment APPLY OINTMENT TO AFFECTED AREA TWICE DAILY TO HANDS AND ELBOWS FOR 21 DAYS      EQ PINK-BISMUTH 262 MG chewable tablet CHEW & SWALLOW 2 TABLETS BY MOUTH 4 TIMES DAILY BEFORE MEAL(S) AND NIGHTLY FOR 5 DAYS      ondansetron (ZOFRAN-ODT) 4 MG disintegrating tablet DISSOLVE 1 TABLET IN MOUTH EVERY 8 HOURS AS NEEDED FOR NAUSEA OR VOMITING      scopolamine (TRANSDERM-SCOP) transdermal patch APPLY 1 PATCH TRANSDERMALLY TO THE SKIN BEHIND THE EAR ONCE EVERY 3 DAYS AS NEEDED      potassium gluconate 550 mg tablet Take 1 tablet by mouth daily      tiZANidine (ZANAFLEX) 4 MG tablet Take 4 mg by mouth every 8 hours as needed       albuterol sulfate  (90 Base) MCG/ACT inhaler Inhale 2 puffs into the lungs 4 times daily 1 Inhaler 5    ferrous sulfate (IRON 325) 325 (65 Fe) MG tablet Take 1 tablet by mouth daily (with breakfast) 90 tablet 5    oxyCODONE-acetaminophen (PERCOCET) 5-325 MG per tablet Take 1 tablet by mouth. Every 4-6 hours PRN      Cholecalciferol (VITAMIN D3) 5000 units TABS Take 1 tablet by mouth daily      Multiple Vitamin (MVI, BARIATRIC ADVANTAGE MULTI-FORMULA, CHEW TAB) Take 1 tablet by mouth daily 30 tablet 5    Calcium Citrate-Vitamin D (CALCIUM + VIT D, BARIATRIC ADVANTAGE, CHEWABLE TABLET) Take 1 tablet by mouth 2 times daily 120 tablet 5    gabapentin (NEURONTIN) 800 MG tablet Take 800 mg by mouth 3 times daily       No current facility-administered medications for this visit. Review of Systems:  Review of Systems   Cardiovascular: Negative for chest pain, palpitations and leg swelling. Gastrointestinal: Positive for abdominal pain, nausea and vomiting. Musculoskeletal: Negative. Skin: Negative. Neurological: Negative for dizziness and weakness. All other systems reviewed and are negative. Objective:      Physical Exam:  /82   Pulse 88   Ht 5' 4\" (1.626 m)   Wt 276 lb (125.2 kg)   BMI 47.38 kg/m²   Wt Readings from Last 3 Encounters:   06/03/21 276 lb (125.2 kg)   05/30/21 279 lb (126.6 kg)   05/12/21 272 lb (123.4 kg)     Body mass index is 47.38 kg/m². Physical exam:  Physical Exam  Constitutional:       Appearance: She is well-developed. Cardiovascular:      Rate and Rhythm: Normal rate and regular rhythm. Pulses: Intact distal pulses. Dorsalis pedis pulses are 2+ on the right side and 2+ on the left side.         Posterior tibial pulses are 2+ on the right side and 2+ on the left side. Heart sounds: Normal heart sounds, S1 normal and S2 normal.   Pulmonary:      Effort: Pulmonary effort is normal.      Breath sounds: Normal breath sounds. Musculoskeletal:         General: Normal range of motion. Skin:     General: Skin is warm and dry. Neurological:      Mental Status: She is alert and oriented to person, place, and time. DATA:  Lab Results   Component Value Date    CKTOTAL 67 01/10/2021    TROPONINI <0.006 04/25/2013    TROPONINI <0.006 11/16/2011     BNP:    Lab Results   Component Value Date    BNP 88 11/26/2010     PT/INR:  No results found for: PTINR  Lab Results   Component Value Date    LABA1C 5.4 02/06/2016    LABA1C 5.2 10/03/2015     Lab Results   Component Value Date    CHOL 146 02/10/2021    TRIG 52 02/10/2021    HDL 51 02/10/2021    LDLDIRECT 87 02/10/2021     Lab Results   Component Value Date    ALT 16 05/27/2021    AST 17 05/27/2021     TSH:  No results found for: TSH    Vitals:    06/03/21 1546   BP: 120/82   Pulse: 88       Echo:1/16/21  Left ventricular systolic function is normal.   Ejection fraction is visually estimated at 55%. Aneurysmal interatrial septum with positive bubble study. No evidence of thrombus within the left atrial appendage. Moderate to severe tricuspid regurgitation is present. No evidence of any pericardial effusion. No evidence of endocarditis.     Stress Test:2/10/21  Stress echo normal.         The 10-year ASCVD risk score (Bhavesh Rodriguez, et al., 2013) is: 1.3%    Values used to calculate the score:      Age: 44 years      Sex: Female      Is Non- : No      Diabetic: No      Tobacco smoker: No      Systolic Blood Pressure: 437 mmHg      Is BP treated: Yes      HDL Cholesterol: 51 MG/DL      Total Cholesterol: 146 MG/DL      Assessment/ Plan:     HTN (hypertension)  -Stable, continue with atenolol 25 mg daily and Catapres 0.1 mg twice daily for SBP greater than 150    Morbid obesity with BMI of 45.0-49.9, adult ([facility]  -S/P gastric sleeve awaiting Jet-en-Y in June. Pre-operative clearance  -During hospitalization in February 2020 patient had stress test which was negative for ischemia. Recent EKG not show any T wave abnormalities. Patient denies any chest pain. Despite patient's multiple hospitalizations she is high functioning. Revised Cardiac Risk Index:   High risk type of surgery no   H/O ischemic heart disease  (h/o MI, or a positive stress test, current complaint of chest pain considered to be secondary to myocardial ischemia,  Use of nitrate therapy or ECG with pathological Q waves; do not count prior coronary revascularization procedure unless one of the other criteria for ischemic heart disease is present) no     H/O heart failure no  H/O CVA no   H/O DM treated with insulin no  Preoperative serum creatinine >2.0 mg/dl (177 micromol/L) no     The calculated rate of cardiac death, nonfatal myocardial infarction, and nonfatal cardiac arrest according to the number of predictors is; No risk factors  0.4% (95% CI: 0.1-0.8)     she is considered a low risk for surgery. Patient seen, interviewed and examined. Testing was reviewed. Patient is encouraged to exercise even a brisk walk for 30 minutes at least 3 to 4 times a week. Lifestyle and risk factor modificatons discussed. Various goals are discussed and questions answered. Continue current medications. Appropriate prescriptions are addressed. Questions answered and patient verbalizes understanding. Call for any problems, questions, or concerns. Pt is to follow up in 6 months for Cardiac management    Electronically signed by Beau Dickerson.  MICHAEL Lee CNP on 6/3/2021 at 4:30 PM

## 2021-06-03 NOTE — ASSESSMENT & PLAN NOTE
-Stable, continue with atenolol 25 mg daily and Catapres 0.1 mg twice daily for SBP greater than 150

## 2021-06-03 NOTE — LETTER
Loi Pope  1970  [id number]    Have you had any Chest Pain that is not new? - No       DO EKG IF: Patient has a Heart Rate above 100 or below 40     CAD (Coronary Artery Disease) patient should have one on file every 6 months        Have you had any Shortness of Breath - No      Have you had any dizziness - Yes   Has vertigo     Have you had any palpitations that are not new? - No      Is the patient on any of the following medications -    Do you have any edema - swelling in No      Do you have a surgery or procedure scheduled in the near future - Yes  If Yes- DO EKG  If Yes - Who is the surgery with?  Dr. Pauly Kingston   Phone number of physician   Fax number of physician   Type of surgery  gastric bypass   GIVE THIS INFORMATION TO RONI THORPE     [facility] patient [facility] if [facility] are not already [facility]    [facility] to see if we have an E-MAIL on file for the patient     Check medication list thoroughly!!! AND RECONCILE OUTSIDE MEDICATIONS  If dose has changed change the entire order not just the MG  BE SURE TO ASK PATIENT IF [address] At check out add to every patient's \"wrap up\" the following dot phrase AFTERHOURSEDUCATION and ensure we explain this to our patients

## 2021-06-03 NOTE — PATIENT INSTRUCTIONS
Please be informed that if you contact our office outside of normal business hours the physician on call cannot help with any scheduling or rescheduling issues, procedure instruction questions or any type of medication issue. We advise you for any urgent/emergency that you go to the nearest emergency room! PLEASE CALL OUR OFFICE DURING NORMAL BUSINESS HOURS    Monday - Friday   8 am to 5 pm    [place]: [address]: [phone]    [place]:  [phone]  **It is YOUR responsibilty to bring medication bottles and/or updated medication list to [address] [place] [address].  This will allow us to better serve you and all your healthcare needs**

## 2021-06-03 NOTE — ASSESSMENT & PLAN NOTE
-During hospitalization in February 2020 patient had stress test which was negative for ischemia. Recent EKG not show any T wave abnormalities. Patient denies any chest pain. Despite patient's multiple hospitalizations she is high functioning. Revised Cardiac Risk Index:   High risk type of surgery no   H/O ischemic heart disease  (h/o MI, or a positive stress test, current complaint of chest pain considered to be secondary to myocardial ischemia,  Use of nitrate therapy or ECG with pathological Q waves; do not count prior coronary revascularization procedure unless one of the other criteria for ischemic heart disease is present) no     H/O heart failure no  H/O CVA no   H/O DM treated with insulin no  Preoperative serum creatinine >2.0 mg/dl (177 micromol/L) no     The calculated rate of cardiac death, nonfatal myocardial infarction, and nonfatal cardiac arrest according to the number of predictors is; No risk factors  0.4% (95% CI: 0.1-0.8)     she is considered a low risk for surgery.

## 2021-06-08 ENCOUNTER — OFFICE VISIT (OUTPATIENT)
Dept: BARIATRICS/WEIGHT MGMT | Age: 51
End: 2021-06-08
Payer: COMMERCIAL

## 2021-06-08 VITALS
HEIGHT: 64 IN | OXYGEN SATURATION: 99 % | SYSTOLIC BLOOD PRESSURE: 110 MMHG | HEART RATE: 76 BPM | DIASTOLIC BLOOD PRESSURE: 80 MMHG | BODY MASS INDEX: 45.46 KG/M2 | WEIGHT: 266.3 LBS

## 2021-06-08 DIAGNOSIS — E66.01 MORBID OBESITY WITH BMI OF 45.0-49.9, ADULT (HCC): ICD-10-CM

## 2021-06-08 DIAGNOSIS — Z98.84 STATUS POST LAPAROSCOPIC SLEEVE GASTRECTOMY: Primary | ICD-10-CM

## 2021-06-08 PROCEDURE — 1111F DSCHRG MED/CURRENT MED MERGE: CPT | Performed by: SURGERY

## 2021-06-08 PROCEDURE — 3017F COLORECTAL CA SCREEN DOC REV: CPT | Performed by: SURGERY

## 2021-06-08 PROCEDURE — G8417 CALC BMI ABV UP PARAM F/U: HCPCS | Performed by: SURGERY

## 2021-06-08 PROCEDURE — 99213 OFFICE O/P EST LOW 20 MIN: CPT | Performed by: SURGERY

## 2021-06-08 PROCEDURE — 1036F TOBACCO NON-USER: CPT | Performed by: SURGERY

## 2021-06-08 PROCEDURE — G8427 DOCREV CUR MEDS BY ELIG CLIN: HCPCS | Performed by: SURGERY

## 2021-06-08 ASSESSMENT — ENCOUNTER SYMPTOMS
CONSTIPATION: 0
PHOTOPHOBIA: 0
DIARRHEA: 0
ABDOMINAL PAIN: 0
SORE THROAT: 0
TROUBLE SWALLOWING: 0
VOICE CHANGE: 0
WHEEZING: 0
COUGH: 0
COLOR CHANGE: 0
BLOOD IN STOOL: 0
VOMITING: 0
ANAL BLEEDING: 0
SHORTNESS OF BREATH: 0
NAUSEA: 0

## 2021-06-08 NOTE — PROGRESS NOTES
BARIATRIC SURGERY POST OPERATIVE NOTE    SUBJECTIVE:    Patient presenting today referred from Brynn Lopez MD, for   Chief Complaint   Patient presents with   [facility] Weight Management     6th  visit  post op 2/12/19 e.d. 5/8/21 abd pain n&v     /80 (Site: Left Upper Arm, Position: Sitting, Cuff Size: Large Adult)   Pulse 76   Ht 5' 4\" (1.626 m)   Wt 266 lb 4.8 oz (120.8 kg)   SpO2 99%   BMI 45.71 kg/m²      HPI: Romayne Hutching is a 44 y.o. female s/p sleeve gastrectomy, now with a Body mass index is 45.71 kg/m². Down from 54.3Kg/m2. NEEDS THE RYGB, now on Tenormin +/- Catapress. .    Rotating Zofran / phenergan. .    Current Outpatient Medications:     cholestyramine light 4 g packet, Take 1 packet by mouth 3 times daily, Disp: 60 packet, Rfl: 3    Melatonin 10 MG TABS, Take 10-20 mg by mouth nightly as needed, Disp: , Rfl:     Cyanocobalamin (VITAMIN B 12 PO), Take 1 tablet by mouth daily, Disp: , Rfl:     meclizine (ANTIVERT) 25 MG tablet, Take 25 mg by mouth 3 times daily as needed for Dizziness, Disp: , Rfl:     dicyclomine (BENTYL) 10 MG capsule, Take 1 capsule by mouth 3 times daily as needed (abdominal pain), Disp: 21 capsule, Rfl: 3    hydrOXYzine (VISTARIL) 50 MG capsule, Take 1 capsule by mouth every 6 hours as needed for Anxiety, Disp: 30 capsule, Rfl: 3    levETIRAcetam (KEPPRA) 1000 MG tablet, Take 1 tablet by mouth 2 times daily, Disp: 60 tablet, Rfl: 3    levothyroxine (SYNTHROID) 125 MCG tablet, Take 1 tablet by mouth Daily, Disp: 30 tablet, Rfl: 3    atenolol (TENORMIN) 25 MG tablet, Take 1 tablet by mouth daily, Disp: 30 tablet, Rfl: 3    cloNIDine (CATAPRES) 0.1 MG tablet, Take 1 tablet by mouth 2 times daily (Patient taking differently: Take 0.1 mg by mouth 2 times daily as needed for High Blood Pressure 05/27/21 Patient states she only takes as needed usually if SBP>150), Disp: 60 tablet, Rfl: 3    estradiol (ESTRACE) 0.5 MG tablet, Take 1 tablet by mouth daily (Patient taking differently: Take 0.5 mg by mouth nightly ), Disp: 21 tablet, Rfl: 3    PARoxetine (PAXIL) 30 MG tablet, Take 1.5 tablets by mouth nightly (Patient taking differently: Take 30 mg by mouth nightly ), Disp: 30 tablet, Rfl: 3    pantoprazole (PROTONIX) 40 MG tablet, Take 1 tablet by mouth daily (with breakfast), Disp: 30 tablet, Rfl: 3    albuterol (PROVENTIL) (2.5 MG/3ML) 0.083% nebulizer solution, Take 3 mLs by nebulization every 6 hours as needed for Wheezing, Disp: 120 each, Rfl: 0    betamethasone valerate (VALISONE) 0.1 % ointment, APPLY OINTMENT TO AFFECTED AREA TWICE DAILY TO HANDS AND ELBOWS FOR 21 DAYS, Disp: , Rfl:     EQ PINK-BISMUTH 262 MG chewable tablet, CHEW & SWALLOW 2 TABLETS BY MOUTH 4 TIMES DAILY BEFORE MEAL(S) AND NIGHTLY FOR 5 DAYS, Disp: , Rfl:     ondansetron (ZOFRAN-ODT) 4 MG disintegrating tablet, DISSOLVE 1 TABLET IN MOUTH EVERY 8 HOURS AS NEEDED FOR NAUSEA OR VOMITING, Disp: , Rfl:     scopolamine (TRANSDERM-SCOP) transdermal patch, APPLY 1 PATCH TRANSDERMALLY TO THE SKIN BEHIND THE EAR ONCE EVERY 3 DAYS AS NEEDED, Disp: , Rfl:     potassium gluconate 550 mg tablet, Take 1 tablet by mouth daily, Disp: , Rfl:     tiZANidine (ZANAFLEX) 4 MG tablet, Take 4 mg by mouth every 8 hours as needed , Disp: , Rfl:     albuterol sulfate  (90 Base) MCG/ACT inhaler, Inhale 2 puffs into the lungs 4 times daily, Disp: 1 Inhaler, Rfl: 5    ferrous sulfate (IRON 325) 325 (65 Fe) MG tablet, Take 1 tablet by mouth daily (with breakfast), Disp: 90 tablet, Rfl: 5    oxyCODONE-acetaminophen (PERCOCET) 5-325 MG per tablet, Take 1 tablet by mouth.  Every 4-6 hours PRN, Disp: , Rfl:     Cholecalciferol (VITAMIN D3) 5000 units TABS, Take 1 tablet by mouth daily, Disp: , Rfl:     Multiple Vitamin (MVI, BARIATRIC ADVANTAGE MULTI-FORMULA, CHEW TAB), Take 1 tablet by mouth daily, Disp: 30 tablet, Rfl: 5    Calcium Citrate-Vitamin D (CALCIUM + VIT D, BARIATRIC ADVANTAGE, CHEWABLE organisms) resistance     4 months ago chest from mediport    Morbid obesity ([facility]     MRSA bacteremia     Nausea     \"Most Of The Time\"    Osteomyelitis of fifth toe of left foot (HCC)     Pain management     Sees Dr. Kasia Ruelas, Once A Month    Pancreatitis chronic Dx 2001    Pneumonia Dx 11-15    Seizures ([facility]     Shortness of breath on exertion     Shortness of breath on exertion     Sleep apnea     Has CPAP\"no longer use the cpap since lost weight\"    Staph infection Dx 11-15    Left Foot    Staph infection Dx 11-15    \"Left Foot\"    Teeth missing     Upper And Lower    Thyroid disease     Wears glasses     To Read      Past Surgical History:   Procedure Laterality Date    APPENDECTOMY  02/1998    Done When Tubes And Ovaries Were Removed    CARDIAC CATHETERIZATION  10/24/2010    CATHETER REMOVAL N/A 7/16/2019    PORT REMOVAL performed by Alexa Gilliland MD at [address] [place] [address]  08/27/1991    CHOLECYSTECTOMY, LAPAROSCOPIC  1990's    COLONOSCOPY  Last Done In 2000's    DENTAL SURGERY      Teeth Extracted In Past    ENDOSCOPY, COLON, DIAGNOSTIC  Last Done In 2018    FOOT DEBRIDEMENT Left 6/16/2019    FIRST METATARSAL DEBRIDEMENT INCISION AND DRAINAGE. EXCISION OF ULCER.  RESECTION OF BONE. 1ST METATARSAL POWER LAVAGE AND BONE CEMENT performed by Jeffery Knight DPM at [place] [address]. Left Last Done In 2016     Dr. Ana Paula No, \" About 6 Surgeries Done Because Of Staph Infection\"    HIATAL HERNIA REPAIR N/A 2/12/2019    HERNIA HIATAL LAPAROSCOPIC ROBOTIC performed by Alexa Gilliland MD at [address] [place] [address]  10/1997    Partial Abdominal Hysterectomy    INSERTION / REMOVAL / REPLACEMENT VENOUS ACCESS CATHETER N/A 8/15/2019    PORT INSERTION performed by Alexa Gilliland MD at [address] [place] [address] Avenue  ~2000    removed after 6 months    LEG BIOPSY EXCISION Left 3/8/2021    LEFT THIGH LESION BIOPSY EXCISION performed by Alexa Gilliland MD at [place] [address], PARTIAL Left 2008    Benign    OTHER SURGICAL HISTORY  02/1998    \"Tubes And Ovaries Removed, Appendectomy Also Done\"    OTHER SURGICAL HISTORY  Last Done 7-15-16    Mediport Insertion \"Total Of Six Done, Removed Last Mediport In 2014\"    PORT SURGERY N/A 1/15/2020    PORT REMOVAL performed by Summer Morris MD at [facility] N/A 7/6/2020    PORT INSERTION performed by Summer Morris MD at [facility] N/A 2/12/2019    GASTRECTOMY SLEEVE LAPAROSCOPIC ROBOTIC performed by Summer Morris MD at [address] [place] [address]  08/27/2018    UPPER GASTROINTESTINAL ENDOSCOPY N/A 4/2/2019    EGD CONTROL HEMORRHAGE with epi injection at bleeding site performed by Summer Morris MD at [facility] N/A 6/19/2019    EGD DIAGNOSTIC ONLY performed by Summer Morris MD at [facility] N/A 7/22/2019    EGD DIAGNOSTIC ONLY performed by Yeny Ha MD at [facility] N/A 10/14/2019    EGD DIAGNOSTIC ONLY performed by Summer Morris MD at [facility] N/A 7/17/2020    EGJ DILATATION BALLOON WITH 18-20 MM BALLOON, DILATED TO 20 MM. performed by Summer Morris MD at [facility] N/A 5/28/2021    EGD BIOPSY performed by Summer Morris MD at [facility] Marital status:      Spouse name: None    Number of children: None    Years of education: None    Highest education level: None   Occupational History    None   Tobacco Use    Smoking status: Never Smoker    Smokeless tobacco: Never Used   Vaping Use    Vaping Use: Never used   Substance and Sexual Activity    Alcohol use: No     Alcohol/week: 0.0 standard drinks    Drug use: No    Sexual activity: Not Currently Other Topics Concern    None   Social History Narrative    None     Social Determinants of Health     Financial Resource Strain:     Difficulty of Paying Living Expenses:    Food Insecurity:     Worried About Running Out of Food in the Last Year:     [address] in the Last Year:    Transportation Needs:     Lack of Transportation (Medical):  Lack of Transportation (Non-Medical):    Physical Activity:     Days of Exercise per Week:     Minutes of Exercise per Session:    Stress:     Feeling of Stress :    Social Connections:     Frequency of Communication with Friends and Family:     Frequency of Social Gatherings with Friends and Family:     Attends [employer] Services:     Active Member of Clubs or Organizations:     Attends Club or Organization Meetings:     Marital Status:    Intimate Partner Violence:     Fear of Current or Ex-Partner:     Emotionally Abused:     Physically Abused:     Sexually Abused:      Family History   Problem Relation Age of Onset    Diabetes Mother         \"Borderline Diabetes\"    High Blood Pressure Mother     Obesity Mother     Arthritis Mother     Heart Disease Mother     High Cholesterol Mother     Vision Loss Mother     Diabetes Father     High Blood Pressure Father     Asthma Father     Cancer Father         prostate cancer    High Blood Pressure Brother     Asthma Son     Vision Loss Son     Lupus Daughter     Other Daughter         \"Alot Of Female Problems\"     Review of Systems   Constitutional: Negative for activity change, chills, diaphoresis and fever. HENT: Negative for sore throat, trouble swallowing and voice change. Eyes: Negative for photophobia and visual disturbance. Respiratory: Negative for cough, shortness of breath and wheezing. Cardiovascular: Negative for chest pain, palpitations and leg swelling.    Gastrointestinal: Negative for abdominal pain, anal bleeding, blood in stool, constipation, diarrhea, nausea and Behavior: Behavior normal.         Thought Content: Thought content normal.         Judgment: Judgment normal.           Assessment / Plan:    1. Status post laparoscopic sleeve gastrectomy    2. Morbid obesity with BMI of 45.0-49.9, adult ([address]          Will get Psych eval 6/15/2021, and then f/u with me the very FIRST clinic for consent of the RYGB, and then 2 wks slimfast diet and then surgery. Follow Up:  Return in about 2 weeks (around 6/22/2021) for Bariatric follow up: diet, exercise & weight loss.     Dionicio Nyhan, MD, FACS, FICS  Member of the [employer]    [phone]    6/8/21

## 2021-06-09 ENCOUNTER — TELEPHONE (OUTPATIENT)
Dept: INFUSION THERAPY | Age: 51
End: 2021-06-09

## 2021-06-16 DIAGNOSIS — Z01.818 PREOPERATIVE TESTING: Primary | ICD-10-CM

## 2021-06-16 DIAGNOSIS — E83.32 HEREDITARY VITAMIN D-DEPENDENT RICKETS (TYPE 1) (TYPE 2): ICD-10-CM

## 2021-06-16 DIAGNOSIS — E44.0 MODERATE PROTEIN-CALORIE MALNUTRITION (HCC): ICD-10-CM

## 2021-06-16 DIAGNOSIS — F15.11 OTHER STIMULANT ABUSE, IN REMISSION (HCC): ICD-10-CM

## 2021-06-17 ENCOUNTER — APPOINTMENT (OUTPATIENT)
Dept: CT IMAGING | Age: 51
End: 2021-06-17
Payer: COMMERCIAL

## 2021-06-17 ENCOUNTER — HOSPITAL ENCOUNTER (OUTPATIENT)
Age: 51
Discharge: HOME | End: 2021-06-17
Payer: COMMERCIAL

## 2021-06-17 ENCOUNTER — HOSPITAL ENCOUNTER (EMERGENCY)
Age: 51
Discharge: HOME | End: 2021-06-17
Attending: EMERGENCY MEDICINE
Payer: COMMERCIAL

## 2021-06-17 VITALS
HEIGHT: 64 IN | SYSTOLIC BLOOD PRESSURE: 105 MMHG | HEART RATE: 57 BPM | WEIGHT: 267 LBS | RESPIRATION RATE: 22 BRPM | DIASTOLIC BLOOD PRESSURE: 57 MMHG | OXYGEN SATURATION: 96 % | TEMPERATURE: 98.4 F | BODY MASS INDEX: 45.58 KG/M2

## 2021-06-17 DIAGNOSIS — R10.9 ABDOMINAL PAIN, UNSPECIFIED ABDOMINAL LOCATION: Primary | ICD-10-CM

## 2021-06-17 DIAGNOSIS — Z01.818 PREOPERATIVE TESTING: ICD-10-CM

## 2021-06-17 DIAGNOSIS — R11.2 NAUSEA AND VOMITING, INTRACTABILITY OF VOMITING NOT SPECIFIED, UNSPECIFIED VOMITING TYPE: ICD-10-CM

## 2021-06-17 DIAGNOSIS — F15.11 OTHER STIMULANT ABUSE, IN REMISSION (HCC): ICD-10-CM

## 2021-06-17 DIAGNOSIS — R19.7 DIARRHEA, UNSPECIFIED TYPE: ICD-10-CM

## 2021-06-17 DIAGNOSIS — E44.0 MODERATE PROTEIN-CALORIE MALNUTRITION (HCC): ICD-10-CM

## 2021-06-17 DIAGNOSIS — E83.32 HEREDITARY VITAMIN D-DEPENDENT RICKETS (TYPE 1) (TYPE 2): ICD-10-CM

## 2021-06-17 LAB
ALBUMIN SERPL-MCNC: 4.2 GM/DL (ref 3.4–5)
ALP BLD-CCNC: 82 IU/L (ref 40–129)
ALT SERPL-CCNC: 12 U/L (ref 10–40)
AMPHETAMINES: NEGATIVE
ANION GAP SERPL CALCULATED.3IONS-SCNC: 7 MMOL/L (ref 4–16)
AST SERPL-CCNC: 12 IU/L (ref 15–37)
BACTERIA: ABNORMAL /HPF
BACTERIA: ABNORMAL /HPF
BARBITURATE SCREEN URINE: NEGATIVE
BASOPHILS ABSOLUTE: 0 K/CU MM
BASOPHILS RELATIVE PERCENT: 0.9 % (ref 0–1)
BENZODIAZEPINE SCREEN, URINE: NEGATIVE
BILIRUB SERPL-MCNC: 0.1 MG/DL (ref 0–1)
BILIRUBIN URINE: NEGATIVE MG/DL
BILIRUBIN URINE: NEGATIVE MG/DL
BLOOD, URINE: NEGATIVE
BLOOD, URINE: NEGATIVE
BUN BLDV-MCNC: 20 MG/DL (ref 6–23)
CALCIUM SERPL-MCNC: 10.3 MG/DL (ref 8.3–10.6)
CANNABINOID SCREEN URINE: NEGATIVE
CHLORIDE BLD-SCNC: 109 MMOL/L (ref 99–110)
CLARITY: ABNORMAL
CLARITY: CLEAR
CO2: 26 MMOL/L (ref 21–32)
COCAINE METABOLITE: NEGATIVE
COLOR: YELLOW
COLOR: YELLOW
CREAT SERPL-MCNC: 0.8 MG/DL (ref 0.6–1.1)
DIFFERENTIAL TYPE: ABNORMAL
EOSINOPHILS ABSOLUTE: 0.2 K/CU MM
EOSINOPHILS RELATIVE PERCENT: 5.4 % (ref 0–3)
FOLATE: 6.3 NG/ML (ref 3.1–17.5)
GFR AFRICAN AMERICAN: >60 ML/MIN/1.73M2
GFR NON-AFRICAN AMERICAN: >60 ML/MIN/1.73M2
GLUCOSE BLD-MCNC: 81 MG/DL (ref 70–99)
GLUCOSE, URINE: NEGATIVE MG/DL
GLUCOSE, URINE: NEGATIVE MG/DL
HCT VFR BLD CALC: 31.8 % (ref 37–47)
HEMOGLOBIN: 10.3 GM/DL (ref 12.5–16)
IMMATURE NEUTROPHIL %: 0.2 % (ref 0–0.43)
IRON: 40 UG/DL (ref 37–145)
KETONES, URINE: NEGATIVE MG/DL
KETONES, URINE: NEGATIVE MG/DL
LACTATE: 0.5 MMOL/L (ref 0.4–2)
LEUKOCYTE ESTERASE, URINE: NEGATIVE
LEUKOCYTE ESTERASE, URINE: NEGATIVE
LIPASE: 18 IU/L (ref 13–60)
LYMPHOCYTES ABSOLUTE: 1.9 K/CU MM
LYMPHOCYTES RELATIVE PERCENT: 45.3 % (ref 24–44)
MCH RBC QN AUTO: 26.5 PG (ref 27–31)
MCHC RBC AUTO-ENTMCNC: 32.4 % (ref 32–36)
MCV RBC AUTO: 82 FL (ref 78–100)
MONOCYTES ABSOLUTE: 0.3 K/CU MM
MONOCYTES RELATIVE PERCENT: 6.8 % (ref 0–4)
MUCUS: ABNORMAL HPF
MUCUS: ABNORMAL HPF
NITRITE URINE, QUANTITATIVE: NEGATIVE
NITRITE URINE, QUANTITATIVE: NEGATIVE
NUCLEATED RBC %: 0 %
OPIATES, URINE: ABNORMAL
OXYCODONE: ABNORMAL
PCT TRANSFERRIN: 13 % (ref 10–44)
PDW BLD-RTO: 15 % (ref 11.7–14.9)
PH, URINE: 5 (ref 5–8)
PH, URINE: 5 (ref 5–8)
PHENCYCLIDINE, URINE: NEGATIVE
PLATELET # BLD: 170 K/CU MM (ref 140–440)
PMV BLD AUTO: 10.5 FL (ref 7.5–11.1)
POTASSIUM SERPL-SCNC: 3.9 MMOL/L (ref 3.5–5.1)
PROTEIN UA: NEGATIVE MG/DL
PROTEIN UA: NEGATIVE MG/DL
RBC # BLD: 3.88 M/CU MM (ref 4.2–5.4)
RBC URINE: <1 /HPF (ref 0–6)
RBC URINE: ABNORMAL /HPF (ref 0–6)
SEGMENTED NEUTROPHILS ABSOLUTE COUNT: 1.8 K/CU MM
SEGMENTED NEUTROPHILS RELATIVE PERCENT: 41.4 % (ref 36–66)
SODIUM BLD-SCNC: 142 MMOL/L (ref 135–145)
SPECIFIC GRAVITY UA: 1.02 (ref 1–1.03)
SPECIFIC GRAVITY UA: 1.03 (ref 1–1.03)
SQUAMOUS EPITHELIAL: 3 /HPF
SQUAMOUS EPITHELIAL: 5 /HPF
TOTAL IMMATURE NEUTOROPHIL: 0.01 K/CU MM
TOTAL IRON BINDING CAPACITY: 314 UG/DL (ref 250–450)
TOTAL NUCLEATED RBC: 0 K/CU MM
TOTAL PROTEIN: 5.7 GM/DL (ref 6.4–8.2)
TRANSITIONAL EPITHELIAL: <1 /HPF
TRICHOMONAS: ABNORMAL /HPF
TRICHOMONAS: ABNORMAL /HPF
TSH HIGH SENSITIVITY: 10.36 UIU/ML (ref 0.27–4.2)
UNSATURATED IRON BINDING CAPACITY: 274 UG/DL (ref 110–370)
UROBILINOGEN, URINE: NEGATIVE MG/DL (ref 0.2–1)
UROBILINOGEN, URINE: NEGATIVE MG/DL (ref 0.2–1)
VITAMIN B-12: >2000 PG/ML (ref 211–911)
VITAMIN D 25-HYDROXY: 34.4 NG/ML
WBC # BLD: 4.3 K/CU MM (ref 4–10.5)
WBC UA: 2 /HPF (ref 0–5)
WBC UA: 3 /HPF (ref 0–5)

## 2021-06-17 PROCEDURE — 84443 ASSAY THYROID STIM HORMONE: CPT

## 2021-06-17 PROCEDURE — 82306 VITAMIN D 25 HYDROXY: CPT

## 2021-06-17 PROCEDURE — G0480 DRUG TEST DEF 1-7 CLASSES: HCPCS

## 2021-06-17 PROCEDURE — 2580000003 HC RX 258: Performed by: EMERGENCY MEDICINE

## 2021-06-17 PROCEDURE — 2500000003 HC RX 250 WO HCPCS: Performed by: EMERGENCY MEDICINE

## 2021-06-17 PROCEDURE — 83690 ASSAY OF LIPASE: CPT

## 2021-06-17 PROCEDURE — 36415 COLL VENOUS BLD VENIPUNCTURE: CPT

## 2021-06-17 PROCEDURE — 81001 URINALYSIS AUTO W/SCOPE: CPT

## 2021-06-17 PROCEDURE — 85025 COMPLETE CBC W/AUTO DIFF WBC: CPT

## 2021-06-17 PROCEDURE — 74176 CT ABD & PELVIS W/O CONTRAST: CPT

## 2021-06-17 PROCEDURE — 80307 DRUG TEST PRSMV CHEM ANLYZR: CPT

## 2021-06-17 PROCEDURE — 83550 IRON BINDING TEST: CPT

## 2021-06-17 PROCEDURE — 83540 ASSAY OF IRON: CPT

## 2021-06-17 PROCEDURE — 80053 COMPREHEN METABOLIC PANEL: CPT

## 2021-06-17 PROCEDURE — 82607 VITAMIN B-12: CPT

## 2021-06-17 PROCEDURE — 82746 ASSAY OF FOLIC ACID SERUM: CPT

## 2021-06-17 PROCEDURE — 6360000002 HC RX W HCPCS: Performed by: EMERGENCY MEDICINE

## 2021-06-17 PROCEDURE — 93005 ELECTROCARDIOGRAM TRACING: CPT | Performed by: PHYSICIAN ASSISTANT

## 2021-06-17 PROCEDURE — 84630 ASSAY OF ZINC: CPT

## 2021-06-17 PROCEDURE — 99285 EMERGENCY DEPT VISIT HI MDM: CPT

## 2021-06-17 PROCEDURE — 83605 ASSAY OF LACTIC ACID: CPT

## 2021-06-17 RX ORDER — FAMOTIDINE 20 MG/1
20 TABLET, FILM COATED ORAL 2 TIMES DAILY
Qty: 14 TABLET | Refills: 0 | Status: SHIPPED | OUTPATIENT
Start: 2021-06-17 | End: 2021-07-30 | Stop reason: ALTCHOICE

## 2021-06-17 RX ORDER — ONDANSETRON 2 MG/ML
4 INJECTION INTRAMUSCULAR; INTRAVENOUS ONCE
Status: COMPLETED | OUTPATIENT
Start: 2021-06-17 | End: 2021-06-18

## 2021-06-17 RX ORDER — 0.9 % SODIUM CHLORIDE 0.9 %
1000 INTRAVENOUS SOLUTION INTRAVENOUS ONCE
Status: COMPLETED | OUTPATIENT
Start: 2021-06-17 | End: 2021-06-17

## 2021-06-17 RX ORDER — CALCIUM CARBONATE 200(500)MG
1 TABLET,CHEWABLE ORAL DAILY
Qty: 30 TABLET | Refills: 0 | Status: SHIPPED | OUTPATIENT
Start: 2021-06-17 | End: 2021-07-17

## 2021-06-17 RX ORDER — MAGNESIUM HYDROXIDE/ALUMINUM HYDROXICE/SIMETHICONE 120; 1200; 1200 MG/30ML; MG/30ML; MG/30ML
30 SUSPENSION ORAL ONCE
Status: DISCONTINUED | OUTPATIENT
Start: 2021-06-17 | End: 2021-06-17 | Stop reason: HOSPADM

## 2021-06-17 RX ORDER — ONDANSETRON 4 MG/1
4 TABLET, ORALLY DISINTEGRATING ORAL EVERY 8 HOURS PRN
Qty: 15 TABLET | Refills: 0 | Status: SHIPPED | OUTPATIENT
Start: 2021-06-17 | End: 2021-10-18

## 2021-06-17 RX ORDER — DICYCLOMINE HYDROCHLORIDE 10 MG/1
10 CAPSULE ORAL 3 TIMES DAILY
Qty: 15 CAPSULE | Refills: 3 | Status: SHIPPED | OUTPATIENT
Start: 2021-06-17 | End: 2021-07-29 | Stop reason: SDUPTHER

## 2021-06-17 RX ORDER — ONDANSETRON 2 MG/ML
4 INJECTION INTRAMUSCULAR; INTRAVENOUS EVERY 30 MIN PRN
Status: DISCONTINUED | OUTPATIENT
Start: 2021-06-17 | End: 2021-06-17 | Stop reason: HOSPADM

## 2021-06-17 RX ORDER — MORPHINE SULFATE 4 MG/ML
4 INJECTION, SOLUTION INTRAMUSCULAR; INTRAVENOUS EVERY 30 MIN PRN
Status: DISCONTINUED | OUTPATIENT
Start: 2021-06-17 | End: 2021-06-17 | Stop reason: HOSPADM

## 2021-06-17 RX ORDER — LIDOCAINE HYDROCHLORIDE 20 MG/ML
15 SOLUTION OROPHARYNGEAL ONCE
Status: DISCONTINUED | OUTPATIENT
Start: 2021-06-17 | End: 2021-06-17 | Stop reason: HOSPADM

## 2021-06-17 RX ORDER — 0.9 % SODIUM CHLORIDE 0.9 %
1000 INTRAVENOUS SOLUTION INTRAVENOUS ONCE
Status: COMPLETED | OUTPATIENT
Start: 2021-06-17 | End: 2021-06-18

## 2021-06-17 RX ORDER — PROMETHAZINE HYDROCHLORIDE 25 MG/ML
25 INJECTION, SOLUTION INTRAMUSCULAR; INTRAVENOUS ONCE
Status: COMPLETED | OUTPATIENT
Start: 2021-06-17 | End: 2021-06-17

## 2021-06-17 RX ORDER — HEPARIN SODIUM (PORCINE) LOCK FLUSH IV SOLN 100 UNIT/ML 100 UNIT/ML
100 SOLUTION INTRAVENOUS ONCE
Status: COMPLETED | OUTPATIENT
Start: 2021-06-17 | End: 2021-06-17

## 2021-06-17 RX ORDER — DICYCLOMINE HYDROCHLORIDE 10 MG/ML
20 INJECTION INTRAMUSCULAR ONCE
Status: COMPLETED | OUTPATIENT
Start: 2021-06-17 | End: 2021-06-17

## 2021-06-17 RX ADMIN — SODIUM CHLORIDE 1000 ML: 9 INJECTION, SOLUTION INTRAVENOUS at 06:18

## 2021-06-17 RX ADMIN — MORPHINE SULFATE 4 MG: 4 INJECTION, SOLUTION INTRAMUSCULAR; INTRAVENOUS at 07:14

## 2021-06-17 RX ADMIN — ONDANSETRON 4 MG: 2 INJECTION INTRAMUSCULAR; INTRAVENOUS at 06:13

## 2021-06-17 RX ADMIN — FAMOTIDINE 20 MG: 10 INJECTION, SOLUTION INTRAVENOUS at 06:11

## 2021-06-17 RX ADMIN — HEPARIN 100 UNITS: 100 SYRINGE at 09:05

## 2021-06-17 RX ADMIN — PROMETHAZINE HYDROCHLORIDE 25 MG: 25 INJECTION INTRAMUSCULAR; INTRAVENOUS at 07:18

## 2021-06-17 RX ADMIN — MORPHINE SULFATE 4 MG: 4 INJECTION, SOLUTION INTRAMUSCULAR; INTRAVENOUS at 06:13

## 2021-06-17 RX ADMIN — DICYCLOMINE HYDROCHLORIDE 20 MG: 20 INJECTION, SOLUTION INTRAMUSCULAR at 06:13

## 2021-06-17 ASSESSMENT — PAIN DESCRIPTION - LOCATION
LOCATION: ABDOMEN
LOCATION: ABDOMEN

## 2021-06-17 ASSESSMENT — PAIN DESCRIPTION - PAIN TYPE
TYPE: ACUTE PAIN
TYPE: ACUTE PAIN

## 2021-06-17 ASSESSMENT — ENCOUNTER SYMPTOMS
DIARRHEA: 1
RESPIRATORY NEGATIVE: 1
VOMITING: 1
ALLERGIC/IMMUNOLOGIC NEGATIVE: 1
EYES NEGATIVE: 1
ABDOMINAL PAIN: 1
NAUSEA: 1

## 2021-06-17 ASSESSMENT — PAIN SCALES - GENERAL
PAINLEVEL_OUTOF10: 9
PAINLEVEL_OUTOF10: 9
PAINLEVEL_OUTOF10: 8
PAINLEVEL_OUTOF10: 9
PAINLEVEL_OUTOF10: 7

## 2021-06-17 NOTE — ED PROVIDER NOTES
function. Normal valvular structures and function.  H/O echocardiogram 08/30/2018    EF 55-60%    Hiatal hernia     History of blood transfusion 09/2015 And 2018    No Reaction To Blood Transfusions Received    History of sleeve gastrectomy     Augustine (hard of hearing)     Right Ear    Hx of cardiac catheterization 10/24/2010    Angiographically normal coronary arteries w/ normal LV function and wall motion.  Hx of transesophageal echocardiography (PILO) for monitoring 12/02/2010    EF 55-60%. WNL.    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 name   Francisco Patterson cysts     \"they found that I have a kidney cyst but not sure which side\" per pt on 7/15/2020    Lupus ([address] Dx 2013    \"Rheumatoid Lupus\"    MDRO (multiple drug resistant organisms) resistance     4 months ago chest from mediport    Morbid obesity ([address]     MRSA bacteremia     Nausea     \"Most Of The Time\"    Osteomyelitis of fifth toe of left foot ([address]     Pain management     Sees Dr. Nancy Chaevz, Once A Month    Pancreatitis chronic Dx 2001    Pneumonia Dx 11-15    Seizures ([address]     Shortness of breath on exertion     Shortness of breath on exertion     Sleep apnea     Has CPAP\"no longer use the cpap since lost weight\"    Staph infection Dx 11-15    Left Foot    Staph infection Dx 11-15    \"Left Foot\"    Teeth missing     Upper And Lower    Thyroid disease     Wears glasses     To Read     Past Surgical History:   Procedure Laterality Date    APPENDECTOMY  02/1998    Done When Tubes And Ovaries Were Removed    CARDIAC CATHETERIZATION  10/24/2010    CATHETER REMOVAL N/A 7/16/2019    PORT REMOVAL performed by Yojana Quigley MD at [place] [address] MD at [facility] ENDOSCOPY N/A 7/22/2019    EGD DIAGNOSTIC ONLY performed by Juliette Alvarez MD at [address] [employer] N/A 10/14/2019    EGD DIAGNOSTIC ONLY performed by Anne-Marie Guerrero MD at [address] [employer] N/A 7/17/2020    EGJ DILATATION BALLOON WITH 18-20 MM BALLOON, DILATED TO 20 MM. performed by Anne-Marie Guerrero MD at [address] [employer] N/A 5/28/2021    EGD BIOPSY performed by Anne-Marie Guerrero MD at [place] [address] History   Problem Relation Age of Onset    Diabetes Mother         \"Borderline Diabetes\"    High Blood Pressure Mother     Obesity Mother     Arthritis Mother     Heart Disease Mother     High Cholesterol Mother     Vision Loss Mother     Diabetes Father     High Blood Pressure Father     Asthma Father     Cancer Father         prostate cancer    High Blood Pressure Brother     Asthma Son     Vision Loss Son     Lupus Daughter     Other Daughter         \"Alot Of Female Problems\"     Social History     Socioeconomic History    Marital status:      Spouse name: Not on file    Number of children: Not on file    Years of education: Not on file    Highest education level: Not on file   Occupational History    Not on file   Tobacco Use    Smoking status: Never Smoker    Smokeless tobacco: Never Used   Vaping Use    Vaping Use: Never used   Substance and Sexual Activity    Alcohol use: No     Alcohol/week: 0.0 standard drinks    Drug use: No    Sexual activity: Not Currently   Other Topics Concern    Not on file   Social History Narrative    Not on file     Social Determinants of Health     Financial Resource Strain:     Difficulty of Paying Living Expenses:    Food Insecurity:     Worried About Running Out of Food in the Last Year:     Tyrese of Food in the Last Year:    Transportation Needs:     Lack of Transportation (Medical):      Lack of Transportation (Non-Medical):    Physical Activity:     Days of Exercise per Week:     Minutes of Exercise per Session:    Stress:     Feeling of Stress :    Social Connections:     Frequency of Communication with Friends and Family:     Frequency of Social Gatherings with Friends and Family:     Attends [employer] Services:     Active Member of Clubs or Organizations:     Attends Club or Organization Meetings:     Marital Status:    Intimate Partner Violence:     Fear of Current or Ex-Partner:     Emotionally Abused:     Physically Abused:     Sexually Abused:      Current Facility-Administered Medications   Medication Dose Route Frequency Provider Last Rate Last Admin    morphine sulfate (PF) injection 4 mg  4 mg Intravenous Q30 Min PRN Zheng Daunt, DO   4 mg at 06/17/21 0714    ondansetron (ZOFRAN) injection 4 mg  4 mg Intravenous Q30 Min PRN Zheng Daunt, DO   4 mg at 06/17/21 8893    aluminum & magnesium hydroxide-simethicone (MAALOX) 200-200-20 MG/5ML suspension 30 mL  30 mL Oral Once Zheng Daunt, DO        lidocaine viscous hcl (XYLOCAINE) 2 % solution 15 mL  15 mL Mouth/Throat Once Zheng Daunt, DO         Current Outpatient Medications   Medication Sig Dispense Refill    ondansetron (ZOFRAN ODT) 4 MG disintegrating tablet Take 1 tablet by mouth every 8 hours as needed for Nausea 15 tablet 0    famotidine (PEPCID) 20 MG tablet Take 1 tablet by mouth 2 times daily 14 tablet 0    dicyclomine (BENTYL) 10 MG capsule Take 1 capsule by mouth 3 times daily As needed for abdominal pain 15 capsule 3    calcium carbonate (ANTACID) 500 MG chewable tablet Take 1 tablet by mouth daily 30 tablet 0    cholestyramine light 4 g packet Take 1 packet by mouth 3 times daily 60 packet 3    Melatonin 10 MG TABS Take 10-20 mg by mouth nightly as needed      Cyanocobalamin (VITAMIN B 12 PO) Take 1 tablet by mouth daily      meclizine (ANTIVERT) 25 MG tablet Take 25 mg by mouth 3 times daily as needed for Dizziness      dicyclomine (BENTYL) 10 MG capsule Take 1 capsule by mouth 3 times daily as needed (abdominal pain) 21 capsule 3    hydrOXYzine (VISTARIL) 50 MG capsule Take 1 capsule by mouth every 6 hours as needed for Anxiety 30 capsule 3    levETIRAcetam (KEPPRA) 1000 MG tablet Take 1 tablet by mouth 2 times daily 60 tablet 3    levothyroxine (SYNTHROID) 125 MCG tablet Take 1 tablet by mouth Daily 30 tablet 3    atenolol (TENORMIN) 25 MG tablet Take 1 tablet by mouth daily 30 tablet 3    cloNIDine (CATAPRES) 0.1 MG tablet Take 1 tablet by mouth 2 times daily (Patient taking differently: Take 0.1 mg by mouth 2 times daily as needed for High Blood Pressure 05/27/21 Patient states she only takes as needed usually if SBP>150) 60 tablet 3    estradiol (ESTRACE) 0.5 MG tablet Take 1 tablet by mouth daily (Patient taking differently: Take 0.5 mg by mouth nightly ) 21 tablet 3    PARoxetine (PAXIL) 30 MG tablet Take 1.5 tablets by mouth nightly (Patient taking differently: Take 30 mg by mouth nightly ) 30 tablet 3    pantoprazole (PROTONIX) 40 MG tablet Take 1 tablet by mouth daily (with breakfast) 30 tablet 3    albuterol (PROVENTIL) (2.5 MG/3ML) 0.083% nebulizer solution Take 3 mLs by nebulization every 6 hours as needed for Wheezing 120 each 0    betamethasone valerate (VALISONE) 0.1 % ointment APPLY OINTMENT TO AFFECTED AREA TWICE DAILY TO HANDS AND ELBOWS FOR 21 DAYS      EQ PINK-BISMUTH 262 MG chewable tablet CHEW & SWALLOW 2 TABLETS BY MOUTH 4 TIMES DAILY BEFORE MEAL(S) AND NIGHTLY FOR 5 DAYS      ondansetron (ZOFRAN-ODT) 4 MG disintegrating tablet DISSOLVE 1 TABLET IN MOUTH EVERY 8 HOURS AS NEEDED FOR NAUSEA OR VOMITING      scopolamine (TRANSDERM-SCOP) transdermal patch APPLY 1 PATCH TRANSDERMALLY TO THE SKIN BEHIND THE EAR ONCE EVERY 3 DAYS AS NEEDED      potassium gluconate 550 mg tablet Take 1 tablet by mouth daily      tiZANidine (ZANAFLEX) 4 MG tablet Take 4 mg by mouth every 8 hours as needed       albuterol sulfate  (90 Base) MCG/ACT inhaler Inhale 2 puffs into the lungs 4 times daily 1 Inhaler 5    ferrous sulfate (IRON 325) 325 (65 Fe) MG tablet Take 1 tablet by mouth daily (with breakfast) 90 tablet 5    oxyCODONE-acetaminophen (PERCOCET) 5-325 MG per tablet Take 1 tablet by mouth.  Every 4-6 hours PRN      Cholecalciferol (VITAMIN D3) 5000 units TABS Take 1 tablet by mouth daily      Multiple Vitamin (MVI, BARIATRIC ADVANTAGE MULTI-FORMULA, CHEW TAB) Take 1 tablet by mouth daily 30 tablet 5    Calcium Citrate-Vitamin D (CALCIUM + VIT D, BARIATRIC ADVANTAGE, CHEWABLE TABLET) Take 1 tablet by mouth 2 times daily 120 tablet 5    gabapentin (NEURONTIN) 800 MG tablet Take 800 mg by mouth 3 times daily        Allergies   Allergen Reactions    Aspirin Palpitations     \"My Heart Rate Elevates\"    Shellfish-Derived Products Swelling    Toradol [Ketorolac Tromethamine] Rash    Compazine [Prochlorperazine]     Reglan [Metoclopramide] Itching     Current Facility-Administered Medications   Medication Dose Route Frequency Provider Last Rate Last Admin    morphine sulfate (PF) injection 4 mg  4 mg Intravenous Q30 Min PRN Buck Hymane, DO   4 mg at 06/17/21 0714    ondansetron (ZOFRAN) injection 4 mg  4 mg Intravenous Q30 Min PRN Buck Ferrell, DO   4 mg at 06/17/21 8065    aluminum & magnesium hydroxide-simethicone (MAALOX) 200-200-20 MG/5ML suspension 30 mL  30 mL Oral Once Buck Hymane, DO        lidocaine viscous hcl (XYLOCAINE) 2 % solution 15 mL  15 mL Mouth/Throat Once Buck Ferrell, DO         Current Outpatient Medications   Medication Sig Dispense Refill    ondansetron (ZOFRAN ODT) 4 MG disintegrating tablet Take 1 tablet by mouth every 8 hours as needed for Nausea 15 tablet 0    famotidine (PEPCID) 20 MG tablet Take 1 tablet by mouth 2 times daily 14 tablet 0    dicyclomine (BENTYL) 10 MG capsule Take 1 capsule by mouth 3 times daily MEAL(S) AND NIGHTLY FOR 5 DAYS      ondansetron (ZOFRAN-ODT) 4 MG disintegrating tablet DISSOLVE 1 TABLET IN MOUTH EVERY 8 HOURS AS NEEDED FOR NAUSEA OR VOMITING      scopolamine (TRANSDERM-SCOP) transdermal patch APPLY 1 PATCH TRANSDERMALLY TO THE SKIN BEHIND THE EAR ONCE EVERY 3 DAYS AS NEEDED      potassium gluconate 550 mg tablet Take 1 tablet by mouth daily      tiZANidine (ZANAFLEX) 4 MG tablet Take 4 mg by mouth every 8 hours as needed       albuterol sulfate  (90 Base) MCG/ACT inhaler Inhale 2 puffs into the lungs 4 times daily 1 Inhaler 5    ferrous sulfate (IRON 325) 325 (65 Fe) MG tablet Take 1 tablet by mouth daily (with breakfast) 90 tablet 5    oxyCODONE-acetaminophen (PERCOCET) 5-325 MG per tablet Take 1 tablet by mouth. Every 4-6 hours PRN      Cholecalciferol (VITAMIN D3) 5000 units TABS Take 1 tablet by mouth daily      Multiple Vitamin (MVI, BARIATRIC ADVANTAGE MULTI-FORMULA, CHEW TAB) Take 1 tablet by mouth daily 30 tablet 5    Calcium Citrate-Vitamin D (CALCIUM + VIT D, BARIATRIC ADVANTAGE, CHEWABLE TABLET) Take 1 tablet by mouth 2 times daily 120 tablet 5    gabapentin (NEURONTIN) 800 MG tablet Take 800 mg by mouth 3 times daily         Nursing Notes Reviewed    VITAL SIGNS:  ED Triage Vitals [06/17/21 0500]   Enc Vitals Group      /75      Pulse 55      Resp 18      Temp 98.4 °F (36.9 °C)      Temp Source Oral      SpO2 99 %      Weight 267 lb (121.1 kg)      Height 5' 4\" (1.626 m)      Head Circumference       Peak Flow       Pain Score       Pain Loc       Pain Edu? Excl. in [address]? PHYSICAL EXAM:  Physical Exam  Vitals and nursing note reviewed. Constitutional:       General: She is not in acute distress. Appearance: She is well-developed and well-groomed. She is obese. She is not ill-appearing, toxic-appearing or diaphoretic. HENT:      Head: Normocephalic and atraumatic.       Right Ear: External ear normal.      Left Ear: External ear normal. Nose: No congestion or rhinorrhea. Eyes:      General: No scleral icterus. Right eye: No discharge. Left eye: No discharge. Extraocular Movements: Extraocular movements intact. Conjunctiva/sclera: Conjunctivae normal.      Pupils: Pupils are equal, round, and reactive to light. Neck:      Trachea: Phonation normal.   Cardiovascular:      Rate and Rhythm: Normal rate and regular rhythm. Pulses: Normal pulses. Heart sounds: Normal heart sounds. No murmur heard. No friction rub. No gallop. Pulmonary:      Effort: Pulmonary effort is normal. No respiratory distress. Breath sounds: Normal breath sounds. No stridor. No wheezing, rhonchi or rales. Abdominal:      General: Bowel sounds are normal. There is no distension. There are no signs of injury. Palpations: Abdomen is soft. There is no mass or pulsatile mass. Tenderness: There is generalized abdominal tenderness. There is no guarding or rebound. Negative signs include Champion's sign, Rovsing's sign and McBurney's sign. Hernia: No hernia is present. Musculoskeletal:         General: No swelling, tenderness, deformity or signs of injury. Normal range of motion. Cervical back: Full passive range of motion without pain and normal range of motion. No edema, erythema, signs of trauma, rigidity, torticollis or crepitus. No pain with movement. Normal range of motion. Right lower leg: No edema. Left lower leg: No edema. Skin:     General: Skin is warm. Coloration: Skin is not jaundiced or pale. Findings: No bruising, erythema, lesion or rash. Neurological:      General: No focal deficit present. Mental Status: She is alert and oriented to person, place, and time. GCS: GCS eye subscore is 4. GCS verbal subscore is 5. GCS motor subscore is 6. Cranial Nerves: Cranial nerves are intact. No cranial nerve deficit, dysarthria or facial asymmetry.       Sensory: Sensation is Alkaline Phosphatase 82 40 - 129 IU/L    GFR Non-African American >60 >60 mL/min/1.73m2    GFR African American >60 >60 mL/min/1.73m2    Anion Gap 7 4 - 16   Lipase   Result Value Ref Range    Lipase 18 13 - 60 IU/L   Lactic Acid, Plasma   Result Value Ref Range    Lactate 0.5 0.4 - 2.0 mMOL/L   Urinalysis with Microscopic   Result Value Ref Range    Color, UA YELLOW YELLOW    Clarity, UA HAZY (A) CLEAR    Glucose, Urine NEGATIVE NEGATIVE MG/DL    Bilirubin Urine NEGATIVE NEGATIVE MG/DL    Ketones, Urine NEGATIVE NEGATIVE MG/DL    Specific Gravity, UA 1.027 1.001 - 1.035    Blood, Urine NEGATIVE NEGATIVE    pH, Urine 5.0 5.0 - 8.0    Protein, UA NEGATIVE NEGATIVE MG/DL    Urobilinogen, Urine NEGATIVE 0.2 - 1.0 MG/DL    Nitrite Urine, Quantitative NEGATIVE NEGATIVE    Leukocyte Esterase, Urine NEGATIVE NEGATIVE    RBC, UA NONE SEEN 0 - 6 /HPF    WBC, UA 3 0 - 5 /HPF    Bacteria, UA RARE (A) NEGATIVE /HPF    Squam Epithel, UA 5 /HPF    Trans Epithel, UA <1 /HPF    Mucus, UA RARE (A) NEGATIVE HPF    Trichomonas, UA NONE SEEN NONE SEEN /HPF        Radiographs (if obtained):  [] The following radiograph was interpreted by myself in the absence of a radiologist:  [x] Radiologist's Report Reviewed:    CT Abd/pelv    EKG (if obtained): (All EKG's are interpreted by myself in the absence of a cardiologist)    MDM:    Patient here with generalized abdominal pain nausea vomiting diarrhea. And chronic abdominal pain chronic pancreatitis she is on Percocet at home its not helping her symptoms. She is had multiple surgeries including gallbladder appendix hysterectomy she has had a gastric sleeve is supposed to have a gastric bypass soon. She appears otherwise well she has chronic abdominal pain she has general abdominal pain, no hernia no pulsatile mass. Patient recheck doing well she ambulated to the breast without difficulty. So far labs are negative imaging is negative.   Vital signs are stable she does not appear to be dehydrated she had 1 episode emesis here. She is on Percocet home Phenergan at home Zofran at home I did offer GI cocktail she refuses she cannot take Toradol she is requesting more pain medicine however I do not see any obvious etiology that warrants narcotic pain medicine. I think she is okay to go home with GI medicines nausea medicine given return precautions and follow-up with patient. No signs of ischemia or obstruction or perforation or sepsis no signs of pancreatitis currently otherwise stable discharged home given return precautions and follow-up information. Stable.     CLINICAL IMPRESSION:  Final diagnoses:   Abdominal pain, unspecified abdominal location   Nausea and vomiting, intractability of vomiting not specified, unspecified vomiting type   Diarrhea, unspecified type       (Please note that portions of this note may have been completed with a voice recognition program. Efforts were made to edit the dictations but occasionally words aremis-transcribed.)    DISPOSITION REFERRAL (if applicable):  Mendel Juba, MD  [facility] [address] [phone]    In 1 day      [facility] Emergency Department  [facility] [id number] [phone]    If symptoms worsen    Timothy Light MD  [address] [facility] [address]  [phone]    Schedule an appointment as soon as possible for a visit in 1 day        DISPOSITION MEDICATIONS (if applicable):  New Prescriptions    CALCIUM CARBONATE (ANTACID) 500 MG CHEWABLE TABLET    Take 1 tablet by mouth daily    DICYCLOMINE (BENTYL) 10 MG CAPSULE    Take 1 capsule by mouth 3 times daily As needed for abdominal pain    FAMOTIDINE (PEPCID) 20 MG TABLET    Take 1 tablet by mouth 2 times daily    ONDANSETRON (ZOFRAN ODT) 4 MG DISINTEGRATING TABLET    Take 1 tablet by mouth every 8 hours as needed for Nausea          DO Harsh Baxter DO  06/17/21 0196

## 2021-06-18 ENCOUNTER — HOSPITAL ENCOUNTER (EMERGENCY)
Age: 51
Discharge: HOME | End: 2021-06-18
Payer: COMMERCIAL

## 2021-06-18 VITALS
OXYGEN SATURATION: 98 % | TEMPERATURE: 97.6 F | HEART RATE: 61 BPM | SYSTOLIC BLOOD PRESSURE: 135 MMHG | RESPIRATION RATE: 16 BRPM | DIASTOLIC BLOOD PRESSURE: 81 MMHG

## 2021-06-18 DIAGNOSIS — R11.2 INTRACTABLE NAUSEA AND VOMITING: Primary | ICD-10-CM

## 2021-06-18 DIAGNOSIS — G89.29 CHRONIC ABDOMINAL PAIN: ICD-10-CM

## 2021-06-18 DIAGNOSIS — R10.9 CHRONIC ABDOMINAL PAIN: ICD-10-CM

## 2021-06-18 LAB
ALBUMIN SERPL-MCNC: 3.8 GM/DL (ref 3.4–5)
ALP BLD-CCNC: 81 IU/L (ref 40–128)
ALT SERPL-CCNC: 14 U/L (ref 10–40)
ANION GAP SERPL CALCULATED.3IONS-SCNC: 7 MMOL/L (ref 4–16)
AST SERPL-CCNC: 22 IU/L (ref 15–37)
BASOPHILS ABSOLUTE: 0 K/CU MM
BASOPHILS RELATIVE PERCENT: 0.7 % (ref 0–1)
BILIRUB SERPL-MCNC: 0.2 MG/DL (ref 0–1)
BUN BLDV-MCNC: 18 MG/DL (ref 6–23)
CALCIUM SERPL-MCNC: 9.8 MG/DL (ref 8.3–10.6)
CHLORIDE BLD-SCNC: 108 MMOL/L (ref 99–110)
CO2: 26 MMOL/L (ref 21–32)
CREAT SERPL-MCNC: 0.6 MG/DL (ref 0.6–1.1)
DIFFERENTIAL TYPE: ABNORMAL
EOSINOPHILS ABSOLUTE: 0.2 K/CU MM
EOSINOPHILS RELATIVE PERCENT: 5.4 % (ref 0–3)
GFR AFRICAN AMERICAN: >60 ML/MIN/1.73M2
GFR NON-AFRICAN AMERICAN: >60 ML/MIN/1.73M2
GLUCOSE BLD-MCNC: 88 MG/DL (ref 70–99)
HCT VFR BLD CALC: 31.6 % (ref 37–47)
HEMOGLOBIN: 9.9 GM/DL (ref 12.5–16)
IMMATURE NEUTROPHIL %: 0.3 % (ref 0–0.43)
LIPASE: 16 IU/L (ref 13–60)
LYMPHOCYTES ABSOLUTE: 1 K/CU MM
LYMPHOCYTES RELATIVE PERCENT: 32.8 % (ref 24–44)
MCH RBC QN AUTO: 25.7 PG (ref 27–31)
MCHC RBC AUTO-ENTMCNC: 31.3 % (ref 32–36)
MCV RBC AUTO: 82.1 FL (ref 78–100)
MONOCYTES ABSOLUTE: 0.3 K/CU MM
MONOCYTES RELATIVE PERCENT: 8.4 % (ref 0–4)
NUCLEATED RBC %: 0 %
PDW BLD-RTO: 15 % (ref 11.7–14.9)
PLATELET # BLD: 139 K/CU MM (ref 140–440)
PMV BLD AUTO: 10.1 FL (ref 7.5–11.1)
POTASSIUM SERPL-SCNC: 3.9 MMOL/L (ref 3.5–5.1)
RBC # BLD: 3.85 M/CU MM (ref 4.2–5.4)
SEGMENTED NEUTROPHILS ABSOLUTE COUNT: 1.6 K/CU MM
SEGMENTED NEUTROPHILS RELATIVE PERCENT: 52.4 % (ref 36–66)
SODIUM BLD-SCNC: 141 MMOL/L (ref 135–145)
TOTAL IMMATURE NEUTOROPHIL: 0.01 K/CU MM
TOTAL NUCLEATED RBC: 0 K/CU MM
TOTAL PROTEIN: 6.2 GM/DL (ref 6.4–8.2)
WBC # BLD: 3 K/CU MM (ref 4–10.5)

## 2021-06-18 PROCEDURE — 6360000002 HC RX W HCPCS: Performed by: EMERGENCY MEDICINE

## 2021-06-18 PROCEDURE — 85025 COMPLETE CBC W/AUTO DIFF WBC: CPT

## 2021-06-18 PROCEDURE — 2580000003 HC RX 258: Performed by: EMERGENCY MEDICINE

## 2021-06-18 PROCEDURE — 83690 ASSAY OF LIPASE: CPT

## 2021-06-18 PROCEDURE — 80053 COMPREHEN METABOLIC PANEL: CPT

## 2021-06-18 PROCEDURE — 6360000002 HC RX W HCPCS: Performed by: PHYSICIAN ASSISTANT

## 2021-06-18 RX ORDER — HEPARIN SODIUM (PORCINE) LOCK FLUSH IV SOLN 100 UNIT/ML 100 UNIT/ML
100 SOLUTION INTRAVENOUS PRN
Status: DISCONTINUED | OUTPATIENT
Start: 2021-06-18 | End: 2021-06-18 | Stop reason: HOSPADM

## 2021-06-18 RX ORDER — 0.9 % SODIUM CHLORIDE 0.9 %
1000 INTRAVENOUS SOLUTION INTRAVENOUS ONCE
Status: DISCONTINUED | OUTPATIENT
Start: 2021-06-18 | End: 2021-06-18 | Stop reason: HOSPADM

## 2021-06-18 RX ADMIN — ONDANSETRON 4 MG: 2 INJECTION INTRAMUSCULAR; INTRAVENOUS at 02:56

## 2021-06-18 RX ADMIN — SODIUM CHLORIDE 1000 ML: 9 INJECTION, SOLUTION INTRAVENOUS at 02:58

## 2021-06-18 RX ADMIN — HEPARIN 100 UNITS: 100 SYRINGE at 04:24

## 2021-06-18 NOTE — ED NOTES
First bolus not even half infused at this time, Lon KEEN aware.      Dalbert Phoenix, JACKIE  06/18/21 0805

## 2021-06-18 NOTE — ED PROVIDER NOTES
Angiographically normal coronary arteries w/ normal LV function and wall motion.  Hx of transesophageal echocardiography (PILO) for monitoring 12/02/2010    EF 55-60%. WNL. 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 name   Vidal Macdonald cysts     \"they found that I have a kidney cyst but not sure which side\" per pt on 7/15/2020    Lupus ([address] Dx 2013    \"Rheumatoid Lupus\"    MDRO (multiple drug resistant organisms) resistance     4 months ago chest from mediport    Morbid obesity ([address]     MRSA bacteremia     Nausea     \"Most Of The Time\"    Osteomyelitis of fifth toe of left foot ([address]     Pain management     Sees Dr. Mitesh Carson, Once A Month    Pancreatitis chronic Dx 2001    Pneumonia Dx 11-15    Seizures ([address]     Shortness of breath on exertion     Shortness of breath on exertion     Sleep apnea     Has CPAP\"no longer use the cpap since lost weight\"    Staph infection Dx 11-15    Left Foot    Staph infection Dx 11-15    \"Left Foot\"    Teeth missing     Upper And Lower    Thyroid disease     Wears glasses     To Read     Past Surgical History:   Procedure Laterality Date    APPENDECTOMY  02/1998    Done When Tubes And Ovaries Were Removed    CARDIAC CATHETERIZATION  10/24/2010    CATHETER REMOVAL N/A 7/16/2019    PORT REMOVAL performed by Helen Sheets MD at [address] [facility]  08/27/1991    CHOLECYSTECTOMY, LAPAROSCOPIC  1990's    COLONOSCOPY  Last Done In 2000's    DENTAL SURGERY      Teeth Extracted In Past    ENDOSCOPY, COLON, DIAGNOSTIC  Last Done In 2018    FOOT DEBRIDEMENT Left 6/16/2019    FIRST METATARSAL DEBRIDEMENT INCISION AND DRAINAGE. EXCISION OF ULCER.  RESECTION OF BONE. 1ST METATARSAL POWER LAVAGE AND BONE CEMENT performed by Kenna Martínez DPM at [address] Left Last Done In 2016     Dr. Elisabet Foote, \" About 6 Surgeries Done Because Of Staph Infection\"    HIATAL HERNIA REPAIR N/A 2/12/2019    HERNIA HIATAL LAPAROSCOPIC ROBOTIC performed by Angela Baxter MD at [facility] [address]  10/1997    Partial Abdominal Hysterectomy    INSERTION / REMOVAL / REPLACEMENT VENOUS ACCESS CATHETER N/A 8/15/2019    PORT INSERTION performed by Angela Baxter MD at [address] [facility]    removed after 6 months    LEG BIOPSY EXCISION Left 3/8/2021    LEFT THIGH LESION BIOPSY EXCISION performed by Angela Baxter MD at [facility], PARTIAL Left 2008    Benign    OTHER SURGICAL HISTORY  02/1998    \"Tubes And Ovaries Removed, Appendectomy Also Done\"    OTHER SURGICAL HISTORY  Last Done 7-15-16    Mediport Insertion \"Total Of Six Done, Removed Last Mediport In 2014\"    PORT SURGERY N/A 1/15/2020    PORT REMOVAL performed by Angela Baxter MD at [address] N/A 7/6/2020    PORT INSERTION performed by Angela Baxter MD at [facility] N/A 2/12/2019    GASTRECTOMY SLEEVE LAPAROSCOPIC ROBOTIC performed by Angela Baxter MD at [facility]  08/27/2018    UPPER GASTROINTESTINAL ENDOSCOPY N/A 4/2/2019    EGD CONTROL HEMORRHAGE with epi injection at bleeding site performed by Angela Baxter MD at [address] [place] 6/19/2019    EGD DIAGNOSTIC ONLY performed by Angela Baxter MD at [address] [place] N/A 7/22/2019    EGD DIAGNOSTIC ONLY performed by Lou Norman MD at [address] [place] 10/14/2019    EGD DIAGNOSTIC ONLY [facility] [address] [facility] N/A 7/17/2020    EGJ DILATATION BALLOON WITH 18-20 MM BALLOON, DILATED TO 20 MM. performed by Timothy Light MD at [facility] N/A 5/28/2021    EGD BIOPSY performed by Timothy Light MD at [place] [address] History   Problem Relation Age of Onset    Diabetes Mother         \"Borderline Diabetes\"    High Blood Pressure Mother     Obesity Mother     Arthritis Mother     Heart Disease Mother     High Cholesterol Mother     Vision Loss Mother     Diabetes Father     High Blood Pressure Father     Asthma Father     Cancer Father         prostate cancer    High Blood Pressure Brother     Asthma Son     Vision Loss Son     Lupus Daughter     Other Daughter         \"Alot Of Female Problems\"     Social History     Socioeconomic History    Marital status:      Spouse name: Not on file    Number of children: Not on file    Years of education: Not on file    Highest education level: Not on file   Occupational History    Not on file   Tobacco Use    Smoking status: Never Smoker    Smokeless tobacco: Never Used   Vaping Use    Vaping Use: Never used   Substance and Sexual Activity    Alcohol use: No     Alcohol/week: 0.0 standard drinks    Drug use: No    Sexual activity: Not Currently   Other Topics Concern    Not on file   Social History Narrative    Not on file     Social Determinants of Health     Financial Resource Strain:     Difficulty of Paying Living Expenses:    Food Insecurity:     Worried About Running Out of Food in the Last Year:     Ran Out of Food in the Last Year:    Transportation Needs:     Lack of Transportation (Medical):      Lack of Transportation (Non-Medical):    Physical Activity:     Days of Exercise per Week:     Minutes of Exercise per Session:    Stress:     Feeling of Stress :    Social Connections:     Frequency of Communication with Friends and Family:     Frequency of Social Gatherings with Friends and Family:     Attends [employer] Services:     Active Member of Clubs or Organizations:     Attends Club or Organization Meetings:     Marital Status:    Intimate Partner Violence:     Fear of Current or Ex-Partner:     Emotionally Abused:     Physically Abused:     Sexually Abused:      Current Facility-Administered Medications   Medication Dose Route Frequency Provider Last Rate Last Admin    0.9 % sodium chloride bolus  1,000 mL Intravenous Once Hezzie Phoenix, PA-C         Current Outpatient Medications   Medication Sig Dispense Refill    ondansetron (ZOFRAN ODT) 4 MG disintegrating tablet Take 1 tablet by mouth every 8 hours as needed for Nausea 15 tablet 0    famotidine (PEPCID) 20 MG tablet Take 1 tablet by mouth 2 times daily 14 tablet 0    dicyclomine (BENTYL) 10 MG capsule Take 1 capsule by mouth 3 times daily As needed for abdominal pain 15 capsule 3    calcium carbonate (ANTACID) 500 MG chewable tablet Take 1 tablet by mouth daily 30 tablet 0    cholestyramine light 4 g packet Take 1 packet by mouth 3 times daily 60 packet 3    Melatonin 10 MG TABS Take 10-20 mg by mouth nightly as needed      Cyanocobalamin (VITAMIN B 12 PO) Take 1 tablet by mouth daily      meclizine (ANTIVERT) 25 MG tablet Take 25 mg by mouth 3 times daily as needed for Dizziness      dicyclomine (BENTYL) 10 MG capsule Take 1 capsule by mouth 3 times daily as needed (abdominal pain) 21 capsule 3    hydrOXYzine (VISTARIL) 50 MG capsule Take 1 capsule by mouth every 6 hours as needed for Anxiety 30 capsule 3    levETIRAcetam (KEPPRA) 1000 MG tablet Take 1 tablet by mouth 2 times daily 60 tablet 3    levothyroxine (SYNTHROID) 125 MCG tablet Take 1 tablet by mouth Daily 30 tablet 3    atenolol (TENORMIN) 25 MG tablet Take 1 tablet by mouth daily 30 tablet 3    cloNIDine (CATAPRES) 0.1 MG tablet Take 1 tablet by mouth 2 times daily (Patient taking differently: Take 0.1 mg by mouth 2 times daily as needed for High Blood Pressure gabapentin (NEURONTIN) 800 MG tablet Take 800 mg by mouth 3 times daily       Allergies   Allergen Reactions    Aspirin Palpitations     \"My Heart Rate Elevates\"    Shellfish-Derived Products Swelling    Toradol [Ketorolac Tromethamine] Rash    Compazine [Prochlorperazine]     Reglan [Metoclopramide] Itching       Nursing Notes Reviewed    Physical Exam:  ED Triage Vitals [06/17/21 2009]   Enc Vitals Group      BP (!) 164/93      Pulse 58      Resp 18      Temp 97.7 °F (36.5 °C)      Temp Source Oral      SpO2 100 %      Weight       Height       Head Circumference       Peak Flow       Pain Score       Pain Loc       Pain Edu? Excl. in [address]? General :Patient is awake alert oriented person place and time no acute distress nontoxic appearing  HEENT: Pupils are equally round and reactive to light extraocular motors are intact conjunctivae clear sclerae white there is no injection no icterus. Nose without any rhinorrhea or epistaxis. Oral mucosa is moist no exudate buccal mucosa shows no ulcerations. Uvula is midline    Neck: Neck is supple full range of motion trachea midline thyroid nonpalpable  Cardiac: Heart regular rate rhythm no murmurs rubs clicks or gallops  Lungs: Lungs are clear to auscultation there is no wheezing rhonchi or rales. There is no use of accessory muscles no nasal flaring identified. Chest wall: There is no tenderness to palpation over the chest wall or over ribs  Abdomen: Abdomen is soft nontender nondistended. There is no firm or pulsatile masses no rebound rigidity or guarding negative Champion's negative McBurney, no peritoneal signs  Suprapubic:  there is no tenderness to palpation over the external bladder   Musculoskeletal: 5 out of 5 strength in all 4 extremities full flexion extension abduction and adduction supination pronation of all extremities and all digits. No obvious muscle atrophy is noted.  No focal muscle deficits are appreciated  Dermatology: Skin is warm and dry there is no obvious abscesses lacerations or lesions noted  Psych: Mentation is grossly normal cognition is grossly normal. Affect is appropriate  Neuro: Motor intact sensory intact cranial nerves II through XII are intact level of consciousness is normal cerebellar function is normal reflexes are grossly normal. No evidence of incontinence or loss of bowel or bladder no saddle anesthesia noted Lymphatic: There is no submandibular or cervical adenopathy appreciated.         I have reviewed and interpreted all of the currently available lab results from this visit (if applicable):  Results for orders placed or performed during the hospital encounter of 06/18/21   Comprehensive Metabolic Panel   Result Value Ref Range    Sodium 141 135 - 145 MMOL/L    Potassium 3.9 3.5 - 5.1 MMOL/L    Chloride 108 99 - 110 mMol/L    CO2 26 21 - 32 MMOL/L    BUN 18 6 - 23 MG/DL    CREATININE 0.6 0.6 - 1.1 MG/DL    Glucose 88 70 - 99 MG/DL    Calcium 9.8 8.3 - 10.6 MG/DL    Albumin 3.8 3.4 - 5.0 GM/DL    Total Protein 6.2 (L) 6.4 - 8.2 GM/DL    Total Bilirubin 0.2 0.0 - 1.0 MG/DL    ALT 14 10 - 40 U/L    AST 22 15 - 37 IU/L    Alkaline Phosphatase 81 40 - 128 IU/L    GFR Non-African American >60 >60 mL/min/1.73m2    GFR African American >60 >60 mL/min/1.73m2    Anion Gap 7 4 - 16   CBC Auto Differential   Result Value Ref Range    WBC 3.0 (L) 4.0 - 10.5 K/CU MM    RBC 3.85 (L) 4.2 - 5.4 M/CU MM    Hemoglobin 9.9 (L) 12.5 - 16.0 GM/DL    Hematocrit 31.6 (L) 37 - 47 %    MCV 82.1 78 - 100 FL    MCH 25.7 (L) 27 - 31 PG    MCHC 31.3 (L) 32.0 - 36.0 %    RDW 15.0 (H) 11.7 - 14.9 %    Platelets 476 (L) 597 - 440 K/CU MM    MPV 10.1 7.5 - 11.1 FL    Differential Type AUTOMATED DIFFERENTIAL     Segs Relative 52.4 36 - 66 %    Lymphocytes % 32.8 24 - 44 %    Monocytes % 8.4 (H) 0 - 4 %    Eosinophils % 5.4 (H) 0 - 3 %    Basophils % 0.7 0 - 1 %    Segs Absolute 1.6 K/CU MM    Lymphocytes Absolute 1.0 K/CU MM    Monocytes Absolute 0.3 K/CU MM Eosinophils Absolute 0.2 K/CU MM    Basophils Absolute 0.0 K/CU MM    Nucleated RBC % 0.0 %    Total Nucleated RBC 0.0 K/CU MM    Total Immature Neutrophil 0.01 K/CU MM    Immature Neutrophil % 0.3 0 - 0.43 %   Urinalysis   Result Value Ref Range    Color, UA YELLOW YELLOW    Clarity, UA CLEAR CLEAR    Glucose, Urine NEGATIVE NEGATIVE MG/DL    Bilirubin Urine NEGATIVE NEGATIVE MG/DL    Ketones, Urine NEGATIVE NEGATIVE MG/DL    Specific Gravity, UA 1.020 1.001 - 1.035    Blood, Urine NEGATIVE NEGATIVE    pH, Urine 5.0 5.0 - 8.0    Protein, UA NEGATIVE NEGATIVE MG/DL    Urobilinogen, Urine NEGATIVE 0.2 - 1.0 MG/DL    Nitrite Urine, Quantitative NEGATIVE NEGATIVE    Leukocyte Esterase, Urine NEGATIVE NEGATIVE    RBC, UA <1 0 - 6 /HPF    WBC, UA 2 0 - 5 /HPF    Bacteria, UA RARE (A) NEGATIVE /HPF    Squam Epithel, UA 3 /HPF    Mucus, UA RARE (A) NEGATIVE HPF    Trichomonas, UA NONE SEEN NONE SEEN /HPF   Lipase   Result Value Ref Range    Lipase 16 13 - 60 IU/L      Radiographs (if obtained):  [] The following radiograph was interpreted by myself in the absence of a radiologist:   [] Radiologist's Report Reviewed:  No orders to display       EKG (if obtained):   Please See Note of attending physician for EKG interpretation. Chart review shows recent radiograph(s):  CT ABDOMEN PELVIS WO CONTRAST Additional Contrast? None    Result Date: 6/17/2021  EXAMINATION: CT OF THE ABDOMEN AND PELVIS WITHOUT CONTRAST 6/17/2021 6:50 am TECHNIQUE: CT of the abdomen and pelvis was performed without the administration of intravenous contrast. Multiplanar reformatted images are provided for review. Dose modulation, iterative reconstruction, and/or weight based adjustment of the mA/kV was utilized to reduce the radiation dose to as low as reasonably achievable.  COMPARISON: 05/09/2021 HISTORY: ORDERING SYSTEM PROVIDED HISTORY: abdominal pain/n/v/d TECHNOLOGIST PROVIDED HISTORY: Reason for exam:->abdominal pain/n/v/d Additional Contrast?->None Decision Support Exception - unselect if not a suspected or confirmed emergency medical condition->Emergency Medical Condition (MA) Is the patient pregnant?->No Reason for Exam: mid upper abd pain FINDINGS: Lower Chest:  Visualized portion of the lower chest demonstrates no acute abnormality. Organs: Solid organs are incompletely evaluated without intravenous contrast. Unchanged pneumobilia. Status post cholecystectomy. The spleen, pancreas, kidneys and adrenal glands are without acute findings. 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 This is unchanged. Punctate nonobstructing left nephrolithiasis. GI/Bowel: No mechanical bowel obstruction. The appendix is not definitely identified, but there are no secondary signs of appendicitis in the right lower quadrant. Stable postoperative appearance of the stomach. Pelvis: The urinary bladder is partially distended without contour abnormality. Status post hysterectomy. No adnexal mass. Peritoneum/Retroperitoneum: No lymphadenopathy. Bones/Soft Tissues: No acute or aggressive osseous lesions. No acute infectious or inflammatory process in the abdomen or pelvis. Punctate nonobstructing left renal calculus. MDM:     Interventions given this visit:   Orders Placed This Encounter   Medications    0.9 % sodium chloride bolus    ondansetron (ZOFRAN) injection 4 mg    0.9 % sodium chloride bolus     Appearing patient presents today to the ED. Mucous membranes are moist.  Belly exam is benign. BUN/creatinine within normal limits. No leukocytosis. His lipase is within normal limits. No acute emergent indication for imaging at this time I did speak to patient's surgeon. And we did review patient's history, physical examination, presentation today, lab values. And general surgery is okay with outpatient follow-up.   I see no emergent indication for admission or further emergent intervention at this time. I independently managed patient today in the ED    /78   Pulse 65   Temp 97.7 °F (36.5 °C) (Oral)   Resp 21   SpO2 100%       Clinical Impression:  1. Intractable nausea and vomiting    2. Chronic abdominal pain        Disposition referral (if applicable):  Linus Crump MD  [facility] [address] [id number] [phone]    In 2 days      Disposition medications (if applicable):  New Prescriptions    No medications on file         Comment: Please note this report has been produced using speech recognition software and may contain errors related to that system including errors in grammar, punctuation, and spelling, as well as words and phrases that may be inappropriate. If there are any questions or concerns please feel free to contact the dictating provider for clarification.       Yessy Zurita, [address], [place]  06/18/21 9914

## 2021-06-18 NOTE — ED NOTES
Pt requesting pain medication at this time. Alexia KEEN notified of pt request. Pt resting in bed at this time in no acute distress, warm blanket provided to pt.      Crystal Kaur RN  06/18/21 1527

## 2021-06-18 NOTE — ED PROVIDER NOTES
As physician-in-triage, I performed a virtual medical screening history and physical exam on this patient. HISTORY OF PRESENT ILLNESS  Andre Yoon is a 44 y.o. female with history of sleeve gastrectomy who presents the emergency department complaints of persistent nausea, vomiting, and upper abdominal pain. Evaluated earlier in the day for same complaints. Had reassuring work-up and was ultimately discharged home. She states that she is scheduled to have Jet-en-Y gastric bypass surgery with Dr. Nancy Griffin and states that she spoke to him today due to the intractable nausea vomiting and he would like to have her hospitalized and move up her surgery for earlier in the week. PHYSICAL EXAM  BP (!) 164/93   Pulse 58   Temp 97.7 °F (36.5 °C) (Oral)   Resp 18   SpO2 100%     On exam, the patient appears in no acute distress. Speech is clear. Breathing is unlabored.   Moves all extremities    CBC, CMP, lipase, Zofran, IV fluid bolus        Robbie Chavez,   06/17/21 1367

## 2021-06-19 LAB
EKG ATRIAL RATE: 59 BPM
EKG DIAGNOSIS: NORMAL
EKG P AXIS: 56 DEGREES
EKG P-R INTERVAL: 178 MS
EKG Q-T INTERVAL: 416 MS
EKG QRS DURATION: 88 MS
EKG QTC CALCULATION (BAZETT): 411 MS
EKG R AXIS: -14 DEGREES
EKG T AXIS: 37 DEGREES
EKG VENTRICULAR RATE: 59 BPM

## 2021-06-19 PROCEDURE — 93010 ELECTROCARDIOGRAM REPORT: CPT | Performed by: INTERNAL MEDICINE

## 2021-06-20 LAB
3-OH-COTININE URINE: <50 NG/ML
ANABASINE URINE: <3 NG/ML
COTININE, URINE: <5 NG/ML
NICOTINE AND METABOLITES: <2 NG/ML
NORNICOTINE URINE: <2 NG/ML

## 2021-06-21 ENCOUNTER — TELEPHONE (OUTPATIENT)
Dept: BARIATRICS/WEIGHT MGMT | Age: 51
End: 2021-06-21

## 2021-06-21 NOTE — TELEPHONE ENCOUNTER
Pt called wanting a message sent to Dr Sandra Eden on what the plan is. Per Edyta, all pt needed was UDS/Wiliam. Labs completed last week.  [place] to review chart and see about sending in for approval.

## 2021-06-25 DIAGNOSIS — D50.0 IRON DEFICIENCY ANEMIA SECONDARY TO BLOOD LOSS (CHRONIC): ICD-10-CM

## 2021-06-25 DIAGNOSIS — M79.7 FIBROMYALGIA: Primary | ICD-10-CM

## 2021-06-28 DIAGNOSIS — D50.0 IRON DEFICIENCY ANEMIA SECONDARY TO BLOOD LOSS (CHRONIC): Primary | ICD-10-CM

## 2021-06-28 DIAGNOSIS — K90.49 MALABSORPTION DUE TO INTOLERANCE, NOT ELSEWHERE CLASSIFIED: ICD-10-CM

## 2021-06-29 ENCOUNTER — HOSPITAL ENCOUNTER (OUTPATIENT)
Dept: INFUSION THERAPY | Age: 51
Discharge: HOME | End: 2021-06-29
Payer: COMMERCIAL

## 2021-06-29 DIAGNOSIS — D50.9 IRON DEFICIENCY ANEMIA, UNSPECIFIED IRON DEFICIENCY ANEMIA TYPE: Primary | ICD-10-CM

## 2021-06-29 DIAGNOSIS — M79.7 FIBROMYALGIA: Primary | ICD-10-CM

## 2021-06-29 DIAGNOSIS — D50.0 IRON DEFICIENCY ANEMIA SECONDARY TO BLOOD LOSS (CHRONIC): ICD-10-CM

## 2021-06-29 DIAGNOSIS — K90.49 MALABSORPTION DUE TO INTOLERANCE, NOT ELSEWHERE CLASSIFIED: ICD-10-CM

## 2021-06-29 LAB
ALBUMIN SERPL-MCNC: 4.2 GM/DL (ref 3.4–5)
ALP BLD-CCNC: 89 IU/L (ref 40–129)
ALT SERPL-CCNC: 13 U/L (ref 10–40)
ANION GAP SERPL CALCULATED.3IONS-SCNC: 14 MMOL/L (ref 4–16)
AST SERPL-CCNC: 14 IU/L (ref 15–37)
BASOPHILS ABSOLUTE: 0 K/CU MM
BASOPHILS RELATIVE PERCENT: 0.5 % (ref 0–1)
BILIRUB SERPL-MCNC: 0.2 MG/DL (ref 0–1)
BUN BLDV-MCNC: 21 MG/DL (ref 6–23)
CALCIUM SERPL-MCNC: 10.1 MG/DL (ref 8.3–10.6)
CHLORIDE BLD-SCNC: 106 MMOL/L (ref 99–110)
CO2: 20 MMOL/L (ref 21–32)
CREAT SERPL-MCNC: 0.8 MG/DL (ref 0.6–1.1)
DIFFERENTIAL TYPE: ABNORMAL
EOSINOPHILS ABSOLUTE: 0.2 K/CU MM
EOSINOPHILS RELATIVE PERCENT: 5.2 % (ref 0–3)
FERRITIN: 43 NG/ML (ref 15–150)
GFR AFRICAN AMERICAN: >60 ML/MIN/1.73M2
GFR NON-AFRICAN AMERICAN: >60 ML/MIN/1.73M2
GLUCOSE BLD-MCNC: 100 MG/DL (ref 70–99)
HCT VFR BLD CALC: 34.1 % (ref 37–47)
HEMOGLOBIN: 11.1 GM/DL (ref 12.5–16)
IRON: 57 UG/DL (ref 37–145)
LYMPHOCYTES ABSOLUTE: 1.7 K/CU MM
LYMPHOCYTES RELATIVE PERCENT: 41.2 % (ref 24–44)
MCH RBC QN AUTO: 26.1 PG (ref 27–31)
MCHC RBC AUTO-ENTMCNC: 32.6 % (ref 32–36)
MCV RBC AUTO: 80.2 FL (ref 78–100)
MONOCYTES ABSOLUTE: 0.3 K/CU MM
MONOCYTES RELATIVE PERCENT: 7.9 % (ref 0–4)
PCT TRANSFERRIN: 18 % (ref 10–44)
PDW BLD-RTO: 15.4 % (ref 11.7–14.9)
PLATELET # BLD: 174 K/CU MM (ref 140–440)
PMV BLD AUTO: 9.5 FL (ref 7.5–11.1)
POTASSIUM SERPL-SCNC: 3.8 MMOL/L (ref 3.5–5.1)
RBC # BLD: 4.25 M/CU MM (ref 4.2–5.4)
SEGMENTED NEUTROPHILS ABSOLUTE COUNT: 1.9 K/CU MM
SEGMENTED NEUTROPHILS RELATIVE PERCENT: 45.2 % (ref 36–66)
SODIUM BLD-SCNC: 140 MMOL/L (ref 135–145)
TOTAL IRON BINDING CAPACITY: 319 UG/DL (ref 250–450)
TOTAL PROTEIN: 5.9 GM/DL (ref 6.4–8.2)
UNSATURATED IRON BINDING CAPACITY: 262 UG/DL (ref 110–370)
WBC # BLD: 4.2 K/CU MM (ref 4–10.5)

## 2021-06-29 PROCEDURE — 36591 DRAW BLOOD OFF VENOUS DEVICE: CPT

## 2021-06-29 PROCEDURE — 80053 COMPREHEN METABOLIC PANEL: CPT

## 2021-06-29 PROCEDURE — 6360000002 HC RX W HCPCS: Performed by: INTERNAL MEDICINE

## 2021-06-29 PROCEDURE — 83540 ASSAY OF IRON: CPT

## 2021-06-29 PROCEDURE — 85025 COMPLETE CBC W/AUTO DIFF WBC: CPT

## 2021-06-29 PROCEDURE — 83550 IRON BINDING TEST: CPT

## 2021-06-29 PROCEDURE — 82728 ASSAY OF FERRITIN: CPT

## 2021-06-29 RX ORDER — SODIUM CHLORIDE 9 MG/ML
INJECTION, SOLUTION INTRAVENOUS CONTINUOUS
Status: CANCELLED | OUTPATIENT
Start: 2021-06-30

## 2021-06-29 RX ORDER — SODIUM CHLORIDE 9 MG/ML
25 INJECTION, SOLUTION INTRAVENOUS PRN
Status: CANCELLED | OUTPATIENT
Start: 2021-06-30

## 2021-06-29 RX ORDER — SODIUM CHLORIDE 0.9 % (FLUSH) 0.9 %
5-40 SYRINGE (ML) INJECTION PRN
Status: CANCELLED | OUTPATIENT
Start: 2021-06-30

## 2021-06-29 RX ORDER — SODIUM CHLORIDE 9 MG/ML
25 INJECTION, SOLUTION INTRAVENOUS PRN
Status: CANCELLED | OUTPATIENT
Start: 2021-06-29

## 2021-06-29 RX ORDER — HEPARIN SODIUM (PORCINE) LOCK FLUSH IV SOLN 100 UNIT/ML 100 UNIT/ML
500 SOLUTION INTRAVENOUS PRN
Status: CANCELLED | OUTPATIENT
Start: 2021-06-30

## 2021-06-29 RX ORDER — EPINEPHRINE 1 MG/ML
0.3 INJECTION, SOLUTION, CONCENTRATE INTRAVENOUS PRN
Status: CANCELLED | OUTPATIENT
Start: 2021-06-30

## 2021-06-29 RX ORDER — HEPARIN SODIUM (PORCINE) LOCK FLUSH IV SOLN 100 UNIT/ML 100 UNIT/ML
500 SOLUTION INTRAVENOUS PRN
Status: DISCONTINUED | OUTPATIENT
Start: 2021-06-29 | End: 2021-06-30 | Stop reason: HOSPADM

## 2021-06-29 RX ORDER — METHYLPREDNISOLONE SODIUM SUCCINATE 125 MG/2ML
125 INJECTION, POWDER, LYOPHILIZED, FOR SOLUTION INTRAMUSCULAR; INTRAVENOUS ONCE
Status: CANCELLED | OUTPATIENT
Start: 2021-06-30 | End: 2021-06-30

## 2021-06-29 RX ORDER — ACETAMINOPHEN 325 MG/1
650 TABLET ORAL ONCE
Status: CANCELLED
Start: 2021-06-30 | End: 2021-06-30

## 2021-06-29 RX ORDER — SODIUM CHLORIDE 0.9 % (FLUSH) 0.9 %
5-40 SYRINGE (ML) INJECTION PRN
Status: CANCELLED | OUTPATIENT
Start: 2021-06-29

## 2021-06-29 RX ORDER — HEPARIN SODIUM (PORCINE) LOCK FLUSH IV SOLN 100 UNIT/ML 100 UNIT/ML
500 SOLUTION INTRAVENOUS PRN
Status: CANCELLED | OUTPATIENT
Start: 2021-06-29

## 2021-06-29 RX ORDER — DIPHENHYDRAMINE HYDROCHLORIDE 50 MG/ML
50 INJECTION INTRAMUSCULAR; INTRAVENOUS ONCE
Status: CANCELLED | OUTPATIENT
Start: 2021-06-30 | End: 2021-06-30

## 2021-06-29 RX ORDER — DEXAMETHASONE SODIUM PHOSPHATE 10 MG/ML
10 INJECTION INTRAMUSCULAR; INTRAVENOUS ONCE
Status: CANCELLED
Start: 2021-06-30 | End: 2021-06-30

## 2021-06-29 RX ORDER — MEPERIDINE HYDROCHLORIDE 50 MG/ML
12.5 INJECTION INTRAMUSCULAR; INTRAVENOUS; SUBCUTANEOUS ONCE
Status: CANCELLED | OUTPATIENT
Start: 2021-06-30 | End: 2021-06-30

## 2021-06-29 RX ADMIN — HEPARIN 500 UNITS: 100 SYRINGE at 10:49

## 2021-07-02 ENCOUNTER — APPOINTMENT (OUTPATIENT)
Dept: CT IMAGING | Age: 51
End: 2021-07-02
Payer: COMMERCIAL

## 2021-07-02 ENCOUNTER — HOSPITAL ENCOUNTER (EMERGENCY)
Age: 51
Discharge: HOME | End: 2021-07-02
Attending: EMERGENCY MEDICINE
Payer: COMMERCIAL

## 2021-07-02 ENCOUNTER — APPOINTMENT (OUTPATIENT)
Dept: GENERAL RADIOLOGY | Age: 51
End: 2021-07-02
Payer: COMMERCIAL

## 2021-07-02 VITALS
SYSTOLIC BLOOD PRESSURE: 142 MMHG | RESPIRATION RATE: 17 BRPM | HEART RATE: 59 BPM | DIASTOLIC BLOOD PRESSURE: 74 MMHG | OXYGEN SATURATION: 100 % | TEMPERATURE: 98.4 F

## 2021-07-02 DIAGNOSIS — R19.7 DIARRHEA, UNSPECIFIED TYPE: ICD-10-CM

## 2021-07-02 DIAGNOSIS — G89.29 CHRONIC ABDOMINAL PAIN: Primary | ICD-10-CM

## 2021-07-02 DIAGNOSIS — R11.2 NAUSEA AND VOMITING, INTRACTABILITY OF VOMITING NOT SPECIFIED, UNSPECIFIED VOMITING TYPE: ICD-10-CM

## 2021-07-02 DIAGNOSIS — R10.9 CHRONIC ABDOMINAL PAIN: Primary | ICD-10-CM

## 2021-07-02 LAB
ALBUMIN SERPL-MCNC: 4.2 GM/DL (ref 3.4–5)
ALP BLD-CCNC: 95 IU/L (ref 40–128)
ALT SERPL-CCNC: 15 U/L (ref 10–40)
AMPHETAMINES: NEGATIVE
ANION GAP SERPL CALCULATED.3IONS-SCNC: 10 MMOL/L (ref 4–16)
AST SERPL-CCNC: 16 IU/L (ref 15–37)
BACTERIA: ABNORMAL /HPF
BARBITURATE SCREEN URINE: NEGATIVE
BASOPHILS ABSOLUTE: 0 K/CU MM
BASOPHILS RELATIVE PERCENT: 1 % (ref 0–1)
BENZODIAZEPINE SCREEN, URINE: NEGATIVE
BILIRUB SERPL-MCNC: 0.2 MG/DL (ref 0–1)
BILIRUBIN URINE: NEGATIVE MG/DL
BLOOD, URINE: NEGATIVE
BUN BLDV-MCNC: 15 MG/DL (ref 6–23)
CALCIUM SERPL-MCNC: 10.7 MG/DL (ref 8.3–10.6)
CANNABINOID SCREEN URINE: NEGATIVE
CHLORIDE BLD-SCNC: 105 MMOL/L (ref 99–110)
CLARITY: CLEAR
CO2: 26 MMOL/L (ref 21–32)
COCAINE METABOLITE: NEGATIVE
COLOR: ABNORMAL
CREAT SERPL-MCNC: 0.8 MG/DL (ref 0.6–1.1)
DIFFERENTIAL TYPE: ABNORMAL
EOSINOPHILS ABSOLUTE: 0.2 K/CU MM
EOSINOPHILS RELATIVE PERCENT: 4.9 % (ref 0–3)
GFR AFRICAN AMERICAN: >60 ML/MIN/1.73M2
GFR NON-AFRICAN AMERICAN: >60 ML/MIN/1.73M2
GLUCOSE BLD-MCNC: 93 MG/DL (ref 70–99)
GLUCOSE, URINE: NEGATIVE MG/DL
HCT VFR BLD CALC: 35.7 % (ref 37–47)
HEMOGLOBIN: 11.4 GM/DL (ref 12.5–16)
HYALINE CASTS: 2 /LPF
IMMATURE NEUTROPHIL %: 0.2 % (ref 0–0.43)
KETONES, URINE: NEGATIVE MG/DL
LACTATE: 0.8 MMOL/L (ref 0.4–2)
LEUKOCYTE ESTERASE, URINE: NEGATIVE
LIPASE: 22 IU/L (ref 13–60)
LYMPHOCYTES ABSOLUTE: 1.8 K/CU MM
LYMPHOCYTES RELATIVE PERCENT: 42.7 % (ref 24–44)
MAGNESIUM: 2.1 MG/DL (ref 1.8–2.4)
MCH RBC QN AUTO: 26.1 PG (ref 27–31)
MCHC RBC AUTO-ENTMCNC: 31.9 % (ref 32–36)
MCV RBC AUTO: 81.9 FL (ref 78–100)
MONOCYTES ABSOLUTE: 0.3 K/CU MM
MONOCYTES RELATIVE PERCENT: 7 % (ref 0–4)
MUCUS: ABNORMAL HPF
NITRITE URINE, QUANTITATIVE: NEGATIVE
NUCLEATED RBC %: 0 %
OPIATES, URINE: NEGATIVE
OXYCODONE: ABNORMAL
PDW BLD-RTO: 14.7 % (ref 11.7–14.9)
PH, URINE: 5 (ref 5–8)
PHENCYCLIDINE, URINE: NEGATIVE
PLATELET # BLD: 181 K/CU MM (ref 140–440)
PMV BLD AUTO: 9.7 FL (ref 7.5–11.1)
POTASSIUM SERPL-SCNC: 4.3 MMOL/L (ref 3.5–5.1)
PRO-BNP: 89.39 PG/ML
PROTEIN UA: NEGATIVE MG/DL
RBC # BLD: 4.36 M/CU MM (ref 4.2–5.4)
RBC URINE: ABNORMAL /HPF (ref 0–6)
SEGMENTED NEUTROPHILS ABSOLUTE COUNT: 1.8 K/CU MM
SEGMENTED NEUTROPHILS RELATIVE PERCENT: 44.2 % (ref 36–66)
SODIUM BLD-SCNC: 141 MMOL/L (ref 135–145)
SPECIFIC GRAVITY UA: 1.01 (ref 1–1.03)
SQUAMOUS EPITHELIAL: 1 /HPF
TOTAL CK: 46 IU/L (ref 26–140)
TOTAL IMMATURE NEUTOROPHIL: 0.01 K/CU MM
TOTAL NUCLEATED RBC: 0 K/CU MM
TOTAL PROTEIN: 7.1 GM/DL (ref 6.4–8.2)
TRICHOMONAS: ABNORMAL /HPF
TROPONIN T: <0.01 NG/ML
TSH HIGH SENSITIVITY: 5.56 UIU/ML (ref 0.27–4.2)
UROBILINOGEN, URINE: NEGATIVE MG/DL (ref 0.2–1)
WBC # BLD: 4.1 K/CU MM (ref 4–10.5)
WBC UA: 1 /HPF (ref 0–5)

## 2021-07-02 PROCEDURE — 80307 DRUG TEST PRSMV CHEM ANLYZR: CPT

## 2021-07-02 PROCEDURE — 85025 COMPLETE CBC W/AUTO DIFF WBC: CPT

## 2021-07-02 PROCEDURE — 99283 EMERGENCY DEPT VISIT LOW MDM: CPT

## 2021-07-02 PROCEDURE — 6370000000 HC RX 637 (ALT 250 FOR IP): Performed by: EMERGENCY MEDICINE

## 2021-07-02 PROCEDURE — 2580000003 HC RX 258: Performed by: EMERGENCY MEDICINE

## 2021-07-02 PROCEDURE — 80053 COMPREHEN METABOLIC PANEL: CPT

## 2021-07-02 PROCEDURE — 96376 TX/PRO/DX INJ SAME DRUG ADON: CPT

## 2021-07-02 PROCEDURE — 96375 TX/PRO/DX INJ NEW DRUG ADDON: CPT

## 2021-07-02 PROCEDURE — 83880 ASSAY OF NATRIURETIC PEPTIDE: CPT

## 2021-07-02 PROCEDURE — 81001 URINALYSIS AUTO W/SCOPE: CPT

## 2021-07-02 PROCEDURE — 96374 THER/PROPH/DIAG INJ IV PUSH: CPT

## 2021-07-02 PROCEDURE — 83735 ASSAY OF MAGNESIUM: CPT

## 2021-07-02 PROCEDURE — 6360000002 HC RX W HCPCS: Performed by: EMERGENCY MEDICINE

## 2021-07-02 PROCEDURE — 83690 ASSAY OF LIPASE: CPT

## 2021-07-02 PROCEDURE — 71250 CT THORAX DX C-: CPT

## 2021-07-02 PROCEDURE — 96372 THER/PROPH/DIAG INJ SC/IM: CPT

## 2021-07-02 PROCEDURE — 74176 CT ABD & PELVIS W/O CONTRAST: CPT

## 2021-07-02 PROCEDURE — 2500000003 HC RX 250 WO HCPCS: Performed by: EMERGENCY MEDICINE

## 2021-07-02 PROCEDURE — 82550 ASSAY OF CK (CPK): CPT

## 2021-07-02 PROCEDURE — 71045 X-RAY EXAM CHEST 1 VIEW: CPT

## 2021-07-02 PROCEDURE — 93005 ELECTROCARDIOGRAM TRACING: CPT | Performed by: EMERGENCY MEDICINE

## 2021-07-02 PROCEDURE — 84484 ASSAY OF TROPONIN QUANT: CPT

## 2021-07-02 PROCEDURE — 84443 ASSAY THYROID STIM HORMONE: CPT

## 2021-07-02 PROCEDURE — 83605 ASSAY OF LACTIC ACID: CPT

## 2021-07-02 RX ORDER — MORPHINE SULFATE 4 MG/ML
2 INJECTION, SOLUTION INTRAMUSCULAR; INTRAVENOUS EVERY 30 MIN PRN
Status: DISCONTINUED | OUTPATIENT
Start: 2021-07-02 | End: 2021-07-03 | Stop reason: HOSPADM

## 2021-07-02 RX ORDER — FAMOTIDINE 20 MG/1
20 TABLET, FILM COATED ORAL 2 TIMES DAILY
Qty: 14 TABLET | Refills: 0 | Status: SHIPPED | OUTPATIENT
Start: 2021-07-02 | End: 2021-07-29

## 2021-07-02 RX ORDER — CALCIUM CARBONATE 200(500)MG
1 TABLET,CHEWABLE ORAL DAILY
Qty: 30 TABLET | Refills: 0 | Status: SHIPPED | OUTPATIENT
Start: 2021-07-02 | End: 2021-07-29

## 2021-07-02 RX ORDER — DICYCLOMINE HYDROCHLORIDE 10 MG/ML
20 INJECTION INTRAMUSCULAR ONCE
Status: COMPLETED | OUTPATIENT
Start: 2021-07-02 | End: 2021-07-02

## 2021-07-02 RX ORDER — ONDANSETRON 2 MG/ML
4 INJECTION INTRAMUSCULAR; INTRAVENOUS EVERY 30 MIN PRN
Status: DISCONTINUED | OUTPATIENT
Start: 2021-07-02 | End: 2021-07-03 | Stop reason: HOSPADM

## 2021-07-02 RX ORDER — MAGNESIUM HYDROXIDE/ALUMINUM HYDROXICE/SIMETHICONE 120; 1200; 1200 MG/30ML; MG/30ML; MG/30ML
30 SUSPENSION ORAL ONCE
Status: COMPLETED | OUTPATIENT
Start: 2021-07-02 | End: 2021-07-02

## 2021-07-02 RX ORDER — PROMETHAZINE HYDROCHLORIDE 25 MG/ML
25 INJECTION, SOLUTION INTRAMUSCULAR; INTRAVENOUS ONCE
Status: COMPLETED | OUTPATIENT
Start: 2021-07-02 | End: 2021-07-02

## 2021-07-02 RX ORDER — HEPARIN SODIUM (PORCINE) LOCK FLUSH IV SOLN 100 UNIT/ML 100 UNIT/ML
100 SOLUTION INTRAVENOUS ONCE
Status: COMPLETED | OUTPATIENT
Start: 2021-07-02 | End: 2021-07-02

## 2021-07-02 RX ORDER — PROMETHAZINE HYDROCHLORIDE 25 MG/1
25 TABLET ORAL EVERY 6 HOURS PRN
Qty: 30 TABLET | Refills: 0 | Status: SHIPPED | OUTPATIENT
Start: 2021-07-02 | End: 2021-07-10

## 2021-07-02 RX ORDER — DICYCLOMINE HYDROCHLORIDE 10 MG/1
10 CAPSULE ORAL 3 TIMES DAILY
Qty: 15 CAPSULE | Refills: 3 | Status: SHIPPED | OUTPATIENT
Start: 2021-07-02 | End: 2021-07-30

## 2021-07-02 RX ORDER — ONDANSETRON 4 MG/1
4 TABLET, ORALLY DISINTEGRATING ORAL EVERY 8 HOURS PRN
Qty: 15 TABLET | Refills: 0 | Status: SHIPPED | OUTPATIENT
Start: 2021-07-02 | End: 2021-07-29

## 2021-07-02 RX ORDER — DICYCLOMINE HYDROCHLORIDE 10 MG/1
10 CAPSULE ORAL 3 TIMES DAILY
Qty: 15 CAPSULE | Refills: 3 | Status: SHIPPED | OUTPATIENT
Start: 2021-07-02 | End: 2021-07-29 | Stop reason: SDUPTHER

## 2021-07-02 RX ORDER — PROMETHAZINE HYDROCHLORIDE 25 MG/1
25 TABLET ORAL EVERY 6 HOURS PRN
Qty: 30 TABLET | Refills: 0 | Status: SHIPPED | OUTPATIENT
Start: 2021-07-02 | End: 2021-07-04

## 2021-07-02 RX ORDER — 0.9 % SODIUM CHLORIDE 0.9 %
1000 INTRAVENOUS SOLUTION INTRAVENOUS ONCE
Status: COMPLETED | OUTPATIENT
Start: 2021-07-02 | End: 2021-07-02

## 2021-07-02 RX ORDER — ONDANSETRON 4 MG/1
4 TABLET, ORALLY DISINTEGRATING ORAL EVERY 8 HOURS PRN
Qty: 15 TABLET | Refills: 0 | Status: SHIPPED | OUTPATIENT
Start: 2021-07-02 | End: 2021-10-18

## 2021-07-02 RX ADMIN — FAMOTIDINE 20 MG: 10 INJECTION, SOLUTION INTRAVENOUS at 20:30

## 2021-07-02 RX ADMIN — MORPHINE SULFATE 2 MG: 4 INJECTION, SOLUTION INTRAMUSCULAR; INTRAVENOUS at 20:29

## 2021-07-02 RX ADMIN — ALUMINUM HYDROXIDE, MAGNESIUM HYDROXIDE, AND SIMETHICONE 30 ML: 200; 200; 20 SUSPENSION ORAL at 20:29

## 2021-07-02 RX ADMIN — ONDANSETRON 4 MG: 2 INJECTION INTRAMUSCULAR; INTRAVENOUS at 21:46

## 2021-07-02 RX ADMIN — HEPARIN 100 UNITS: 100 SYRINGE at 22:14

## 2021-07-02 RX ADMIN — ONDANSETRON 4 MG: 2 INJECTION INTRAMUSCULAR; INTRAVENOUS at 20:30

## 2021-07-02 RX ADMIN — MORPHINE SULFATE 2 MG: 4 INJECTION, SOLUTION INTRAMUSCULAR; INTRAVENOUS at 21:47

## 2021-07-02 RX ADMIN — DICYCLOMINE HYDROCHLORIDE 20 MG: 10 INJECTION INTRAMUSCULAR at 20:30

## 2021-07-02 RX ADMIN — SODIUM CHLORIDE 1000 ML: 9 INJECTION, SOLUTION INTRAVENOUS at 20:27

## 2021-07-02 RX ADMIN — PROMETHAZINE HYDROCHLORIDE 25 MG: 25 INJECTION INTRAMUSCULAR; INTRAVENOUS at 21:59

## 2021-07-02 ASSESSMENT — ENCOUNTER SYMPTOMS
EYES NEGATIVE: 1
RESPIRATORY NEGATIVE: 1
DIARRHEA: 1
ALLERGIC/IMMUNOLOGIC NEGATIVE: 1
NAUSEA: 1
VOMITING: 1
ABDOMINAL PAIN: 1

## 2021-07-02 ASSESSMENT — PAIN SCALES - GENERAL
PAINLEVEL_OUTOF10: 7
PAINLEVEL_OUTOF10: 8
PAINLEVEL_OUTOF10: 7

## 2021-07-02 ASSESSMENT — PAIN DESCRIPTION - LOCATION: LOCATION: ABDOMEN;CHEST

## 2021-07-02 ASSESSMENT — PAIN DESCRIPTION - PAIN TYPE: TYPE: ACUTE PAIN

## 2021-07-02 NOTE — ED PROVIDER NOTES
[facility]      TRIAGE CHIEF COMPLAINT:   Chest Pain and Abdominal Pain      Ewiiaapaayp:  Clifford Redd is a 44 y.o. female that presents with complaint of abdominal pain nausea vomiting diarrhea chest pain. I seen patient before for this she has chronic abdominal pain has history of chronic pain, obesity, gastric sleeve. She denies any fevers shortness of breath cough travel sick contacts urine complaints. States has had worsening abdominal pain nausea vomiting diarrhea chest pain from vomiting. She otherwise has no complaints. She is well to see her surgeon on Tuesday for a Jet-en-Y she states. No other questions or concerns. She is on Percocet Zofran and Phenergan at home. REVIEW OF SYSTEMS:  At least 10 systems reviewed and otherwise acutely negative except as in the [address] Review of Systems   Constitutional: Positive for fatigue. HENT: Negative. Eyes: Negative. Respiratory: Negative. Cardiovascular: Positive for chest pain. Gastrointestinal: Positive for abdominal pain, diarrhea, nausea and vomiting. Endocrine: Negative. Genitourinary: Negative. Musculoskeletal: Positive for myalgias. Skin: Negative. Allergic/Immunologic: Negative. Neurological: Negative. Hematological: Negative. Psychiatric/Behavioral: Negative. All other systems reviewed and are negative.       Past Medical History:   Diagnosis Date    Acid reflux     Anemia     Anxiety     Arthritis     Hands, Back And Ankles    Bleeding ulcer 2014    \"I Had Ulcers In My Stomach And Colon\"/ per pt on 8/12/2019\"they said recently having some blood in my stomach- in July ( 2019)could not find where coming from \"    Bronchitis Last Episode 2014    Chronic back pain     Chronic pain     Sees Dr. Lorrie Cogan COPD (chronic obstructive pulmonary disease) ([facility]     Sees Dr. Saida Hanson Depression     Fibromyalgia Dx 2013    GERD (gastroesophageal reflux disease)  H/O echocardiogram 08/11/2015    EF >55%. LA to be at the upper limit of normal in size. LV hypertrophy with normal LV systolic, but abnormal diastolic function. Normal valvular structures and function.  H/O echocardiogram 08/30/2018    EF 55-60%    Hiatal hernia     History of blood transfusion 09/2015 And 2018    No Reaction To Blood Transfusions Received    History of sleeve gastrectomy     [place] (hard of hearing)     Right Ear    Hx of cardiac catheterization 10/24/2010    Angiographically normal coronary arteries w/ normal LV function and wall motion.  Hx of transesophageal echocardiography (PILO) for monitoring 12/02/2010    EF 55-60%. WNL.    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 name   Freddy Wilfredo cysts     \"they found that I have a kidney cyst but not sure which side\" per pt on 7/15/2020    Lupus ([address] Dx 2013    \"Rheumatoid Lupus\"    MDRO (multiple drug resistant organisms) resistance     4 months ago chest from mediport    Morbid obesity ([address]     MRSA bacteremia     Nausea     \"Most Of The Time\"    Osteomyelitis of fifth toe of left foot ([address]     Pain management     Sees Dr. Devon Mars, Once A Month    Pancreatitis chronic Dx 2001    Pneumonia Dx 11-15    Seizures ([address]     Shortness of breath on exertion     Shortness of breath on exertion     Sleep apnea     Has CPAP\"no longer use the cpap since lost weight\"    Staph infection Dx 11-15    Left Foot    Staph infection Dx 11-15    \"Left Foot\"    Teeth missing     Upper And Lower    Thyroid disease     Wears glasses     To Read     Past Surgical History:   Procedure Laterality Date    APPENDECTOMY  02/1998    Done When Tubes And Ovaries Were Removed    CARDIAC CATHETERIZATION  10/24/2010    CATHETER REMOVAL N/A 7/16/2019    PORT REMOVAL performed by Nava Cross MD at [facility]  08/27/1991    CHOLECYSTECTOMY, LAPAROSCOPIC  1990's    COLONOSCOPY  Last Done In 2000's   [facility] DENTAL SURGERY      Teeth Extracted In Past    ENDOSCOPY, COLON, DIAGNOSTIC  Last Done In 2018    FOOT DEBRIDEMENT Left 6/16/2019    FIRST METATARSAL DEBRIDEMENT INCISION AND DRAINAGE. EXCISION OF ULCER.  RESECTION OF BONE. 1ST METATARSAL POWER LAVAGE AND BONE CEMENT performed by Nereyda Campbell DPM at [place] [id number] Left Last Done In 2016     Dr. Fernanda Aldrich, \" About 6 Surgeries Done Because Of Staph Infection\"    HIATAL HERNIA REPAIR N/A 2/12/2019    HERNIA HIATAL LAPAROSCOPIC ROBOTIC [facility]  [facility]    Partial Abdominal Hysterectomy    INSERTION / REMOVAL / REPLACEMENT VENOUS ACCESS CATHETER N/A 8/15/2019    PORT INSERTION performed by Nava Cross MD at [facility]    removed after 6 months    LEG BIOPSY EXCISION Left 3/8/2021    LEFT THIGH LESION BIOPSY EXCISION performed by Nava Cross MD at [facility], PARTIAL Left 2008    Benign    OTHER SURGICAL HISTORY  02/1998    \"Tubes And Ovaries Removed, Appendectomy Also Done\"    OTHER SURGICAL HISTORY  Last Done 7-15-16    Mediport Insertion \"Total Of Six Done, Removed Last Mediport In 2014\"    PORT SURGERY N/A 1/15/2020    PORT REMOVAL performed by Nava Cross MD at [address] [place] [address] N/A 7/6/2020    PORT INSERTION performed by Nava Cross MD at [address] [facility] N/A 2/12/2019    GASTRECTOMY SLEEVE LAPAROSCOPIC ROBOTIC performed by Nava Cross MD at [facility]  08/27/2018    UPPER GASTROINTESTINAL ENDOSCOPY N/A 4/2/2019    EGD CONTROL HEMORRHAGE with epi injection at bleeding site performed by Gabriela London MD at [address] [place] [address] N/A 6/19/2019    EGD DIAGNOSTIC ONLY performed by Gabriela London MD at [address] [place] [address] N/A 7/22/2019    EGD DIAGNOSTIC ONLY performed by King Fidel MD at [address] [place] [address] N/A 10/14/2019    EGD DIAGNOSTIC ONLY performed by Gabriela London MD at [address] [place] [address] N/A 7/17/2020    EGJ DILATATION BALLOON WITH 18-20 MM BALLOON, DILATED TO 20 MM. performed by Gabriela London MD at [address] [place] [address] N/A 5/28/2021    EGD BIOPSY performed by Gabriela London MD at [facility] ENDOSCOPY     Family History   Problem Relation Age of Onset    Diabetes Mother         \"Borderline Diabetes\"    High Blood Pressure Mother     Obesity Mother     Arthritis Mother     Heart Disease Mother     High Cholesterol Mother     Vision Loss Mother     Diabetes Father     High Blood Pressure Father     Asthma Father     Cancer Father         prostate cancer    High Blood Pressure Brother     Asthma Son     Vision Loss Son     Lupus Daughter     Other Daughter         \"Alot Of Female Problems\"     Social History     Socioeconomic History    Marital status:      Spouse name: Not on file    Number of children: Not on file    Years of education: Not on file    Highest education level: Not on file   Occupational History    Not on file   Tobacco Use    Smoking status: Never Smoker    Smokeless tobacco: Never Used   Vaping Use    Vaping Use: Never used   Substance and Sexual Activity    Alcohol use: No     Alcohol/week: 0.0 standard drinks    Drug use: No    Sexual activity: Not Currently   Other Topics Concern    Not on file   Social History Narrative    Not on file     Social Determinants of Health     Financial Resource Strain:     Difficulty of Paying Living Expenses:    Food Insecurity:     Worried About Running Out of Food in the Last Year:     [address] in the Last Year:    Transportation Needs:     Lack of Transportation (Medical):      Lack of Transportation (Non-Medical):    Physical Activity:     Days of Exercise per Week:     Minutes of Exercise per Session:    Stress:     Feeling of Stress :    Social Connections:     Frequency of Communication with Friends and Family:     Frequency of Social Gatherings with Friends and Family:     Attends Sikhism Services:     Active Member of Clubs or Organizations:     Attends Club or Organization Meetings:     Marital Status:    Intimate Partner Violence:     Fear of Current or Ex-Partner:     Emotionally Abused:     Physically Abused:     Sexually Abused:      Current Facility-Administered Medications   Medication Dose Route Frequency Provider Last Rate Last Admin    ondansetron (ZOFRAN) injection 4 mg  4 mg Intravenous Q30 Min PRN [employer], DO   4 mg at 07/02/21 2146    morphine sulfate (PF) injection 2 mg  2 mg Intravenous Q30 Min PRN [employer], DO   2 mg at 07/02/21 2147     Current Outpatient Medications   Medication Sig Dispense Refill    famotidine (PEPCID) 20 MG tablet Take 1 tablet by mouth 2 times daily 14 tablet 0    dicyclomine (BENTYL) 10 MG capsule Take 1 capsule by mouth 3 times daily As needed for abdominal pain 15 capsule 3    ondansetron (ZOFRAN ODT) 4 MG disintegrating tablet Take 1 tablet by mouth every 8 hours as needed for Nausea 15 tablet 0    promethazine (PHENERGAN) 25 MG tablet Take 1 tablet by mouth every 6 hours as needed for Nausea 30 tablet 0    calcium carbonate (ANTACID) 500 MG chewable tablet Take 1 tablet by mouth daily 30 tablet 0    famotidine (PEPCID) 20 MG tablet Take 1 tablet by mouth 2 times daily 14 tablet 0    dicyclomine (BENTYL) 10 MG capsule Take 1 capsule by mouth 3 times daily As needed for abdominal pain 15 capsule 3    ondansetron (ZOFRAN ODT) 4 MG disintegrating tablet Take 1 tablet by mouth every 8 hours as needed for Nausea 15 tablet 0    calcium carbonate (ANTACID) 500 MG chewable tablet Take 1 tablet by mouth daily 30 tablet 0    promethazine (PHENERGAN) 25 MG tablet Take 1 tablet by mouth every 6 hours as needed for Nausea 30 tablet 0    ondansetron (ZOFRAN ODT) 4 MG disintegrating tablet Take 1 tablet by mouth every 8 hours as needed for Nausea 15 tablet 0    famotidine (PEPCID) 20 MG tablet Take 1 tablet by mouth 2 times daily 14 tablet 0    dicyclomine (BENTYL) 10 MG capsule Take 1 capsule by mouth 3 times daily As needed for abdominal pain 15 capsule 3    calcium carbonate (ANTACID) 500 MG chewable tablet Take 1 tablet by mouth daily 30 tablet 0    cholestyramine light 4 g packet Take 1 packet by mouth 3 times daily 60 packet 3    Melatonin 10 MG TABS Take 10-20 mg by mouth nightly as needed      Cyanocobalamin (VITAMIN B 12 PO) Take 1 tablet by mouth daily      meclizine (ANTIVERT) 25 MG tablet Take 25 mg by mouth 3 times daily as needed for Dizziness      dicyclomine (BENTYL) 10 MG capsule Take 1 capsule by mouth 3 times daily as needed (abdominal pain) 21 capsule 3    hydrOXYzine (VISTARIL) 50 MG capsule Take 1 capsule by mouth every 6 hours as needed for Anxiety 30 capsule 3    levETIRAcetam (KEPPRA) 1000 MG tablet Take 1 tablet by mouth 2 times daily 60 tablet 3    levothyroxine (SYNTHROID) 125 MCG tablet Take 1 tablet by mouth Daily 30 tablet 3    atenolol (TENORMIN) 25 MG tablet Take 1 tablet by mouth daily 30 tablet 3    cloNIDine (CATAPRES) 0.1 MG tablet Take 1 tablet by mouth 2 times daily (Patient taking differently: Take 0.1 mg by mouth 2 times daily as needed for High Blood Pressure 05/27/21 Patient states she only takes as needed usually if SBP>150) 60 tablet 3    estradiol (ESTRACE) 0.5 MG tablet Take 1 tablet by mouth daily (Patient taking differently: Take 0.5 mg by mouth nightly ) 21 tablet 3    PARoxetine (PAXIL) 30 MG tablet Take 1.5 tablets by mouth nightly (Patient taking differently: Take 30 mg by mouth nightly ) 30 tablet 3    pantoprazole (PROTONIX) 40 MG tablet Take 1 tablet by mouth daily (with breakfast) 30 tablet 3    albuterol (PROVENTIL) (2.5 MG/3ML) 0.083% nebulizer solution Take 3 mLs by nebulization every 6 hours as needed for Wheezing 120 each 0    betamethasone valerate (VALISONE) 0.1 % ointment APPLY OINTMENT TO AFFECTED AREA TWICE DAILY TO HANDS AND ELBOWS FOR 21 DAYS      EQ PINK-BISMUTH 262 MG chewable tablet CHEW & SWALLOW 2 TABLETS BY MOUTH 4 TIMES DAILY BEFORE MEAL(S) AND NIGHTLY FOR 5 DAYS      ondansetron (ZOFRAN-ODT) 4 MG disintegrating tablet DISSOLVE 1 TABLET IN MOUTH EVERY 8 HOURS AS NEEDED FOR NAUSEA OR VOMITING      scopolamine (TRANSDERM-SCOP) transdermal patch APPLY 1 PATCH TRANSDERMALLY TO THE SKIN BEHIND THE EAR ONCE EVERY 3 DAYS AS NEEDED      potassium gluconate 550 mg tablet Take 1 tablet by mouth daily      tiZANidine (ZANAFLEX) 4 MG tablet Take 4 mg by mouth every 8 hours as needed       albuterol sulfate  (90 Base) MCG/ACT inhaler Inhale 2 puffs into the lungs 4 times daily 1 Inhaler 5    ferrous sulfate (IRON 325) 325 (65 Fe) MG tablet Take 1 tablet by mouth daily (with breakfast) 90 tablet 5    oxyCODONE-acetaminophen (PERCOCET) 5-325 MG per tablet Take 1 tablet by mouth.  Every 4-6 hours PRN      Cholecalciferol (VITAMIN D3) 5000 units TABS Take 1 tablet by mouth daily      Multiple Vitamin (MVI, BARIATRIC ADVANTAGE MULTI-FORMULA, CHEW TAB) Take 1 tablet by mouth daily 30 tablet 5    Calcium Citrate-Vitamin D (CALCIUM + VIT D, BARIATRIC ADVANTAGE, CHEWABLE TABLET) Take 1 tablet by mouth 2 times daily 120 tablet 5    gabapentin (NEURONTIN) 800 MG tablet Take 800 mg by mouth 3 times daily        Allergies   Allergen Reactions    Aspirin Palpitations     \"My Heart Rate Elevates\"    Shellfish-Derived Products Swelling    Toradol [Ketorolac Tromethamine] Rash    Compazine [Prochlorperazine]     Reglan [Metoclopramide] Itching     Current Facility-Administered Medications   Medication Dose Route Frequency Provider Last Rate Last Admin    ondansetron (ZOFRAN) injection 4 mg  4 mg Intravenous Q30 Min PRN [employer], DO   4 mg at 07/02/21 2146    morphine sulfate (PF) injection 2 mg  2 mg Intravenous Q30 Min PRN [employer], DO   2 mg at 07/02/21 2147     Current Outpatient Medications   Medication Sig Dispense Refill    famotidine (PEPCID) 20 MG tablet Take 1 tablet by mouth 2 times daily 14 tablet 0    dicyclomine (BENTYL) 10 MG capsule Take 1 capsule by mouth 3 times daily As needed for abdominal pain 15 capsule 3    ondansetron (ZOFRAN ODT) 4 MG disintegrating tablet Take 1 tablet by mouth every 8 hours as needed for Nausea 15 tablet 0    promethazine (PHENERGAN) 25 MG tablet Take 1 tablet by mouth every 6 hours as needed for Nausea 30 tablet 0    calcium carbonate (ANTACID) 500 MG chewable tablet Take 1 tablet by mouth daily 30 tablet 0    famotidine (PEPCID) 20 MG tablet Take 1 tablet by mouth 2 times daily 14 tablet 0    dicyclomine (BENTYL) 10 MG capsule Take 1 capsule by mouth 3 times daily As needed for abdominal pain 15 capsule 3    ondansetron (ZOFRAN ODT) 4 MG disintegrating tablet Take 1 tablet by mouth every 8 hours as needed for Nausea 15 tablet 0    calcium carbonate (ANTACID) 500 MG chewable tablet Take 1 tablet by mouth daily 30 tablet 0    promethazine (PHENERGAN) 25 MG tablet Take 1 tablet by mouth every 6 hours as needed for Nausea 30 tablet 0    ondansetron (ZOFRAN ODT) 4 MG disintegrating tablet Take 1 tablet by mouth every 8 hours as needed for Nausea 15 tablet 0    famotidine (PEPCID) 20 MG tablet Take 1 tablet by mouth 2 times daily 14 tablet 0    dicyclomine (BENTYL) 10 MG capsule Take 1 capsule by mouth 3 times daily As needed for abdominal pain 15 capsule 3    calcium carbonate (ANTACID) 500 MG chewable tablet Take 1 tablet by mouth daily 30 tablet 0    cholestyramine light 4 g packet Take 1 packet by mouth 3 times daily 60 packet 3    Melatonin 10 MG TABS Take 10-20 mg by mouth nightly as needed      Cyanocobalamin (VITAMIN B 12 PO) Take 1 tablet by mouth daily      meclizine (ANTIVERT) 25 MG tablet Take 25 mg by mouth 3 times daily as needed for Dizziness      dicyclomine (BENTYL) 10 MG capsule Take 1 capsule by mouth 3 times daily as needed (abdominal pain) 21 capsule 3    hydrOXYzine (VISTARIL) 50 MG capsule Take 1 capsule by mouth every 6 hours as needed for Anxiety 30 capsule 3    levETIRAcetam (KEPPRA) 1000 MG tablet Take 1 tablet by mouth 2 times daily 60 tablet 3    levothyroxine (SYNTHROID) 125 MCG tablet Take 1 tablet by mouth Daily 30 tablet 3    atenolol (TENORMIN) 25 MG tablet Take 1 tablet by mouth daily 30 tablet 3    cloNIDine (CATAPRES) 0.1 MG tablet Take 1 tablet by mouth 2 times daily (Patient taking differently: Take 0.1 mg by mouth 2 times daily as needed for High Blood Pressure 05/27/21 Patient states she only takes as needed usually if SBP>150) 60 tablet 3    estradiol (ESTRACE) 0.5 MG tablet Take 1 tablet by mouth daily (Patient taking differently: Take 0.5 mg by mouth nightly ) 21 tablet 3    PARoxetine (PAXIL) 30 MG tablet Take 1.5 tablets by mouth nightly (Patient taking differently: Take 30 mg by mouth nightly ) 30 tablet 3    pantoprazole (PROTONIX) 40 MG tablet Take 1 tablet by mouth daily (with breakfast) 30 tablet 3    albuterol (PROVENTIL) (2.5 MG/3ML) 0.083% nebulizer solution Take 3 mLs by nebulization every 6 hours as needed for Wheezing 120 each 0    betamethasone valerate (VALISONE) 0.1 % ointment APPLY OINTMENT TO AFFECTED AREA TWICE DAILY TO HANDS AND ELBOWS FOR 21 DAYS      EQ PINK-BISMUTH 262 MG chewable tablet CHEW & SWALLOW 2 TABLETS BY MOUTH 4 TIMES DAILY BEFORE MEAL(S) AND NIGHTLY FOR 5 DAYS      ondansetron (ZOFRAN-ODT) 4 MG disintegrating tablet DISSOLVE 1 TABLET IN MOUTH EVERY 8 HOURS AS NEEDED FOR NAUSEA OR VOMITING      scopolamine (TRANSDERM-SCOP) transdermal patch APPLY 1 PATCH TRANSDERMALLY TO THE SKIN BEHIND THE EAR ONCE EVERY 3 DAYS AS NEEDED      potassium gluconate 550 mg tablet Take 1 tablet by mouth daily      tiZANidine (ZANAFLEX) 4 MG tablet Take 4 mg by mouth every 8 hours as needed       albuterol sulfate  (90 Base) MCG/ACT inhaler Inhale 2 puffs into the lungs 4 times daily 1 Inhaler 5    ferrous sulfate (IRON 325) 325 (65 Fe) MG tablet Take 1 tablet by mouth daily (with breakfast) 90 tablet 5    oxyCODONE-acetaminophen (PERCOCET) 5-325 MG per tablet Take 1 tablet by mouth. Every 4-6 hours PRN      Cholecalciferol (VITAMIN D3) 5000 units TABS Take 1 tablet by mouth daily      Multiple Vitamin (MVI, BARIATRIC ADVANTAGE MULTI-FORMULA, CHEW TAB) Take 1 tablet by mouth daily 30 tablet 5    Calcium Citrate-Vitamin D (CALCIUM + VIT D, BARIATRIC ADVANTAGE, CHEWABLE TABLET) Take 1 tablet by mouth 2 times daily 120 tablet 5    gabapentin (NEURONTIN) 800 MG tablet Take 800 mg by mouth 3 times daily         Nursing Notes Reviewed    VITAL SIGNS:  ED Triage Vitals [07/02/21 1727]   Enc Vitals Group      /72      Pulse 130      Resp 18      Temp 98.4 °F (36.9 °C)      Temp Source Oral      SpO2 99 %      Weight       Height       Head Circumference       Peak Flow       Pain Score       Pain Loc       Pain Edu? Excl. in [address]? PHYSICAL EXAM:  Physical Exam  Vitals and nursing note reviewed. Constitutional:       General: She is not in acute distress. Appearance: Normal appearance. She is well-developed and well-groomed. She is obese. She is not ill-appearing, toxic-appearing or diaphoretic. HENT:      Head: Normocephalic and atraumatic. Right Ear: External ear normal.      Left Ear: External ear normal.      Nose: No congestion or rhinorrhea. Eyes:      General: No scleral icterus. Right eye: No discharge. Left eye: No discharge. Extraocular Movements: Extraocular movements intact. Conjunctiva/sclera: Conjunctivae normal.   Neck:      Vascular: No JVD. Trachea: Phonation normal.   Cardiovascular:      Rate and Rhythm: Normal rate and regular rhythm. Pulses: Normal pulses. Heart sounds: Normal heart sounds. No murmur heard. No friction rub. No gallop. Pulmonary:      Effort: Pulmonary effort is normal. No respiratory distress. Breath sounds: Normal breath sounds. No stridor. No wheezing, rhonchi or rales. Abdominal:      General: Bowel sounds are normal. There is no distension. There are no signs of injury. Palpations: Abdomen is soft. There is no mass or pulsatile mass. Tenderness: There is generalized abdominal tenderness. There is no guarding or rebound. Negative signs include Champion's sign, Rovsing's sign and McBurney's sign. Hernia: No hernia is present. Musculoskeletal:         General: No swelling, tenderness, deformity or signs of injury. Normal range of motion. Cervical back: Full passive range of motion without pain and normal range of motion. No edema, erythema, signs of trauma, rigidity, torticollis or crepitus. No pain with movement. Normal range of motion. Right lower leg: No edema. Left lower leg: No edema. Skin:     General: Skin is warm. Coloration: Skin is not jaundiced or pale. Findings: No bruising, erythema, lesion or rash. Neurological:      General: No focal deficit present. Mental Status: She is alert and oriented to person, place, and time. GCS: GCS eye subscore is 4. GCS verbal subscore is 5. GCS motor subscore is 6. Cranial Nerves: Cranial nerves are intact. No cranial nerve deficit, dysarthria or facial asymmetry. Sensory: Sensation is intact. No sensory deficit. Motor: Motor function is intact. No weakness, tremor, atrophy, abnormal muscle tone or seizure activity. Coordination: Coordination is intact. Coordination normal.   Psychiatric:         Mood and Affect: Mood normal.         Behavior: Behavior normal. Behavior is cooperative. Thought Content:  Thought content normal.         Judgment: Judgment normal.           I have reviewed andinterpreted all of the currently available lab results from this visit (if applicable):    Results for orders placed or performed during the hospital encounter of 07/02/21   CBC Auto Differential   Result Value Ref Range    WBC 4.1 4.0 - 10.5 K/CU MM    RBC 4.36 4.2 - 5.4 M/CU MM    Hemoglobin 11.4 (L) 12.5 - 16.0 GM/DL    Hematocrit 35.7 (L) 37 - 47 %    MCV 81.9 78 - 100 FL    MCH 26.1 (L) 27 - 31 PG    MCHC 31.9 (L) 32.0 - 36.0 %    RDW 14.7 11.7 - 14.9 %    Platelets 459 705 - 120 K/CU MM    MPV 9.7 7.5 - 11.1 FL    Differential Type AUTOMATED DIFFERENTIAL     Segs Relative 44.2 36 - 66 %    Lymphocytes % 42.7 24 - 44 %    Monocytes % 7.0 (H) 0 - 4 %    Eosinophils % 4.9 (H) 0 - 3 %    Basophils % 1.0 0 - 1 %    Segs Absolute 1.8 K/CU MM    Lymphocytes Absolute 1.8 K/CU MM    Monocytes Absolute 0.3 K/CU MM    Eosinophils Absolute 0.2 K/CU MM    Basophils Absolute 0.0 K/CU MM    Nucleated RBC % 0.0 %    Total Nucleated RBC 0.0 K/CU MM    Total Immature Neutrophil 0.01 K/CU MM    Immature Neutrophil % 0.2 0 - 0.43 %   CMP   Result Value Ref Range    Sodium 141 135 - 145 MMOL/L    Potassium 4.3 3.5 - 5.1 MMOL/L    Chloride 105 99 - 110 mMol/L    CO2 26 21 - 32 MMOL/L    BUN 15 6 - 23 MG/DL    CREATININE 0.8 0.6 - 1.1 MG/DL    Glucose 93 70 - 99 MG/DL    Calcium 10.7 (H) 8.3 - 10.6 MG/DL    Albumin 4.2 3.4 - 5.0 GM/DL    Total Protein 7.1 6.4 - 8.2 GM/DL    Total Bilirubin 0.2 0.0 - 1.0 MG/DL    ALT 15 10 - 40 U/L    AST 16 15 - 37 IU/L    Alkaline Phosphatase 95 40 - 128 IU/L    GFR Non-African American >60 >60 mL/min/1.73m2    GFR African American >60 >60 mL/min/1.73m2    Anion Gap 10 4 - 16   Lipase   Result Value Ref Range    Lipase 22 13 - 60 IU/L   Troponin   Result Value Ref Range    Troponin T <0.010 <0.01 NG/ML   Brain Natriuretic Peptide   Result Value Ref Range    Pro-BNP 89.39 <300 PG/ML   Lactic Acid, Plasma   Result Value Ref Range    Lactate 0.8 0.4 - 2.0 mMOL/L   Urine Drug Screen   Result Value Ref Range    Cannabinoid Scrn, Ur NEGATIVE NEGATIVE    Amphetamines NEGATIVE NEGATIVE    Cocaine Metabolite NEGATIVE NEGATIVE    Benzodiazepine Screen, Urine NEGATIVE NEGATIVE    Barbiturate Screen, Ur NEGATIVE NEGATIVE    Opiates, Urine NEGATIVE NEGATIVE    Phencyclidine, Urine NEGATIVE NEGATIVE    Oxycodone UNCONFIRMED POSITIVE (A) NEGATIVE   TSH without Reflex   Result Value Ref Range    TSH, High Sensitivity 5.560 (H) 0.270 - 4.20 uIu/ml   Urinalysis   Result Value Ref Range    Color, UA STRAW (A) YELLOW    Clarity, UA CLEAR CLEAR    Glucose, Urine NEGATIVE NEGATIVE MG/DL    Bilirubin Urine NEGATIVE NEGATIVE MG/DL    Ketones, Urine NEGATIVE NEGATIVE MG/DL    Specific Gravity, UA 1.011 1.001 - 1.035    Blood, Urine NEGATIVE NEGATIVE    pH, Urine 5.0 5.0 - 8.0    Protein, UA NEGATIVE NEGATIVE MG/DL    Urobilinogen, Urine NEGATIVE 0.2 - 1.0 MG/DL    Nitrite Urine, Quantitative NEGATIVE NEGATIVE    Leukocyte Esterase, Urine NEGATIVE NEGATIVE    RBC, UA NONE SEEN 0 - 6 /HPF    WBC, UA 1 0 - 5 /HPF    Bacteria, UA RARE (A) NEGATIVE /HPF    Squam Epithel, UA 1 /HPF    Mucus, UA RARE (A) NEGATIVE HPF    Trichomonas, UA NONE SEEN NONE SEEN /HPF    Hyaline Casts, UA 2 /LPF   CK   Result Value Ref Range    Total CK 46 26 - 140 IU/L   Magnesium   Result Value Ref Range    Magnesium 2.1 1.8 - 2.4 mg/dl        Radiographs (if obtained):  [] The following radiograph was interpreted by myself in the absence of a radiologist:  [x] Radiologist's Report Reviewed:    CXR, CT Abd pelv    CT ABDOMEN PELVIS WO CONTRAST Additional Contrast? None    Result Date: 6/17/2021  EXAMINATION: CT OF THE ABDOMEN AND PELVIS WITHOUT CONTRAST 6/17/2021 6:50 am TECHNIQUE: CT of the abdomen and pelvis was performed without the administration of intravenous contrast. Multiplanar reformatted images are provided for review. Dose modulation, iterative reconstruction, and/or weight based adjustment of the mA/kV was utilized to reduce the radiation dose to as low as reasonably achievable. COMPARISON: 05/09/2021 HISTORY: ORDERING SYSTEM PROVIDED HISTORY: abdominal pain/n/v/d TECHNOLOGIST PROVIDED HISTORY: Reason for exam:->abdominal pain/n/v/d Additional Contrast?->None Decision Support Exception - unselect if not a suspected or confirmed emergency medical condition->Emergency Medical Condition (MA) Is the patient pregnant?->No Reason for Exam: mid upper abd pain FINDINGS: Lower Chest:  Visualized portion of the lower chest demonstrates no acute abnormality. Organs: Solid organs are incompletely evaluated without intravenous contrast. Unchanged pneumobilia. Status post cholecystectomy. The spleen, pancreas, kidneys and adrenal glands are without acute findings. 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 This is unchanged. Punctate nonobstructing left nephrolithiasis. GI/Bowel: No mechanical bowel obstruction. The appendix is not definitely identified, but there are no secondary signs of appendicitis in the right lower quadrant. Stable postoperative appearance of the stomach. Pelvis: The urinary bladder is partially distended without contour abnormality. Status post hysterectomy. No adnexal mass. Peritoneum/Retroperitoneum: No lymphadenopathy. Bones/Soft Tissues: No acute or aggressive osseous lesions. No acute infectious or inflammatory process in the abdomen or pelvis. Punctate nonobstructing left renal calculus.        EKG (if obtained): (All EKG's are interpreted by myself in the absence of a cardiologist)    12 lead EKG per my interpretation:  Sinus Bradycardia 57  Axis is   Normal  QTc is  393  There is no specific T wave changes appreciated. There is no specific ST wave changes appreciated. Prior EKG to compare with was not available     Anyi Urbina DO  07/02/21 9912        MDM:    Patient here with, chronic abdominal pain nausea vomiting diarrhea. Again I seen patient before for this is her third visit here in the last month or so. She has known history of gastric sleeve, obesity, chronic pain. She is on Percocet, Phenergan, Zofran at home. She supposed to have a Jet-en-Y she states with her surgeon on Tuesday DENISSE RIBEIRO AT [place] appointment. Patient states worsening abdominal pain nausea vomiting diarrhea last couple days. On my arrival, examination she appears very well vital signs are stable she has generalized abdominal pain abdomen soft and nonsurgical at this time cardiac work-up is negative x-ray of her chest is negative labs all negative I did rescan her abdomen pelvis is negative I gave her pain nausea medicine IV fluids. On reevaluation she is sitting comfortably she is asking for more pain medicine no more nausea medicine before she goes home. I did talk to her surgeon on-call knows her very well okay with outpatient follow-up. No signs of emergent pathology such as pancreatitis or cholecystitis or obstruction or perforation or ischemia or ACS or volvulus or obstruction otherwise patient stable discharge given return precautions and follow-up with patient stable. No signs of appendicitis either. She is ok with plan. Given very strict return precautions and follow-up information.     CLINICAL IMPRESSION:  Final diagnoses:   Chronic abdominal pain   Nausea and vomiting, intractability of vomiting not specified, unspecified vomiting type   Diarrhea, unspecified type       (Please note that portions of this note may have been completed with a voice recognition program. Efforts were made to edit the dictations but occasionally words aremis-transcribed.)    DISPOSITION REFERRAL (if applicable):  Jluis Estevez MD  [address] [address] [facility] [id number] [phone]    In 1 day      [facility] Emergency Department  [address] [address] [address] [facility] [address], [place]  [address]  [phone]    Schedule an appointment as soon as possible for a visit in 1 day        DISPOSITION MEDICATIONS (if applicable):  Discharge Medication List as of 7/2/2021 10:02 PM      START taking these medications    Details   !! famotidine (PEPCID) 20 MG tablet Take 1 tablet by mouth 2 times daily, Disp-14 tablet, R-0Print      !! dicyclomine (BENTYL) 10 MG capsule Take 1 capsule by mouth 3 times daily As needed for abdominal pain, Disp-15 capsule, R-3Print      !! ondansetron (ZOFRAN ODT) 4 MG disintegrating tablet Take 1 tablet by mouth every 8 hours as needed for Nausea, Disp-15 tablet, R-0Print      promethazine (PHENERGAN) 25 MG tablet Take 1 tablet by mouth every 6 hours as needed for Nausea, Disp-30 tablet, R-0Print      !! calcium carbonate (ANTACID) 500 MG chewable tablet Take 1 tablet by mouth daily, Disp-30 tablet, R-0Print       !! - Potential duplicate medications found. Please discuss with provider.              Pravin Kingston, DO Pravin Kingston DO  07/02/21 8538

## 2021-07-03 PROBLEM — Z01.818 PRE-OPERATIVE CLEARANCE: Status: RESOLVED | Noted: 2021-02-01 | Resolved: 2021-07-03

## 2021-07-03 LAB
EKG ATRIAL RATE: 57 BPM
EKG DIAGNOSIS: NORMAL
EKG P AXIS: 38 DEGREES
EKG P-R INTERVAL: 182 MS
EKG Q-T INTERVAL: 404 MS
EKG QRS DURATION: 82 MS
EKG QTC CALCULATION (BAZETT): 393 MS
EKG R AXIS: -16 DEGREES
EKG T AXIS: 11 DEGREES
EKG VENTRICULAR RATE: 57 BPM

## 2021-07-03 PROCEDURE — 93010 ELECTROCARDIOGRAM REPORT: CPT | Performed by: INTERNAL MEDICINE

## 2021-07-04 ENCOUNTER — HOSPITAL ENCOUNTER (EMERGENCY)
Age: 51
Discharge: HOME | End: 2021-07-04
Payer: COMMERCIAL

## 2021-07-04 VITALS
TEMPERATURE: 98.6 F | RESPIRATION RATE: 19 BRPM | HEIGHT: 64 IN | WEIGHT: 266 LBS | DIASTOLIC BLOOD PRESSURE: 70 MMHG | BODY MASS INDEX: 45.41 KG/M2 | SYSTOLIC BLOOD PRESSURE: 137 MMHG | OXYGEN SATURATION: 92 % | HEART RATE: 92 BPM

## 2021-07-04 DIAGNOSIS — R11.2 NON-INTRACTABLE VOMITING WITH NAUSEA, UNSPECIFIED VOMITING TYPE: ICD-10-CM

## 2021-07-04 DIAGNOSIS — G89.29 CHRONIC ABDOMINAL PAIN: Primary | ICD-10-CM

## 2021-07-04 DIAGNOSIS — R10.9 CHRONIC ABDOMINAL PAIN: Primary | ICD-10-CM

## 2021-07-04 LAB
ALBUMIN SERPL-MCNC: 4.2 GM/DL (ref 3.4–5)
ALP BLD-CCNC: 92 IU/L (ref 40–129)
ALT SERPL-CCNC: 13 U/L (ref 10–40)
ANION GAP SERPL CALCULATED.3IONS-SCNC: 8 MMOL/L (ref 4–16)
AST SERPL-CCNC: 14 IU/L (ref 15–37)
BACTERIA: ABNORMAL /HPF
BASOPHILS ABSOLUTE: 0 K/CU MM
BASOPHILS RELATIVE PERCENT: 1.1 % (ref 0–1)
BILIRUB SERPL-MCNC: 0.1 MG/DL (ref 0–1)
BILIRUBIN URINE: NEGATIVE MG/DL
BLOOD, URINE: NEGATIVE
BUN BLDV-MCNC: 22 MG/DL (ref 6–23)
CALCIUM SERPL-MCNC: 9.6 MG/DL (ref 8.3–10.6)
CHLORIDE BLD-SCNC: 106 MMOL/L (ref 99–110)
CLARITY: ABNORMAL
CO2: 25 MMOL/L (ref 21–32)
COLOR: YELLOW
CREAT SERPL-MCNC: 0.8 MG/DL (ref 0.6–1.1)
DIFFERENTIAL TYPE: ABNORMAL
EOSINOPHILS ABSOLUTE: 0.2 K/CU MM
EOSINOPHILS RELATIVE PERCENT: 5 % (ref 0–3)
GFR AFRICAN AMERICAN: >60 ML/MIN/1.73M2
GFR NON-AFRICAN AMERICAN: >60 ML/MIN/1.73M2
GLUCOSE BLD-MCNC: 106 MG/DL (ref 70–99)
GLUCOSE, URINE: NEGATIVE MG/DL
HCT VFR BLD CALC: 32.9 % (ref 37–47)
HEMOGLOBIN: 10.7 GM/DL (ref 12.5–16)
IMMATURE NEUTROPHIL %: 0.3 % (ref 0–0.43)
KETONES, URINE: NEGATIVE MG/DL
LEUKOCYTE ESTERASE, URINE: NEGATIVE
LIPASE: 18 IU/L (ref 13–60)
LYMPHOCYTES ABSOLUTE: 1.3 K/CU MM
LYMPHOCYTES RELATIVE PERCENT: 34.3 % (ref 24–44)
MCH RBC QN AUTO: 26.6 PG (ref 27–31)
MCHC RBC AUTO-ENTMCNC: 32.5 % (ref 32–36)
MCV RBC AUTO: 81.8 FL (ref 78–100)
MONOCYTES ABSOLUTE: 0.3 K/CU MM
MONOCYTES RELATIVE PERCENT: 8.4 % (ref 0–4)
MUCUS: ABNORMAL HPF
NITRITE URINE, QUANTITATIVE: NEGATIVE
NUCLEATED RBC %: 0 %
PDW BLD-RTO: 14.6 % (ref 11.7–14.9)
PH, URINE: 5 (ref 5–8)
PLATELET # BLD: 160 K/CU MM (ref 140–440)
PMV BLD AUTO: 10.1 FL (ref 7.5–11.1)
POTASSIUM SERPL-SCNC: 3.9 MMOL/L (ref 3.5–5.1)
PROTEIN UA: NEGATIVE MG/DL
RBC # BLD: 4.02 M/CU MM (ref 4.2–5.4)
RBC URINE: <1 /HPF (ref 0–6)
REASON FOR REJECTION: NORMAL
REJECTED TEST: NORMAL
SEGMENTED NEUTROPHILS ABSOLUTE COUNT: 1.9 K/CU MM
SEGMENTED NEUTROPHILS RELATIVE PERCENT: 50.9 % (ref 36–66)
SODIUM BLD-SCNC: 139 MMOL/L (ref 135–145)
SPECIFIC GRAVITY UA: 1.02 (ref 1–1.03)
SQUAMOUS EPITHELIAL: 3 /HPF
TOTAL IMMATURE NEUTOROPHIL: 0.01 K/CU MM
TOTAL NUCLEATED RBC: 0 K/CU MM
TOTAL PROTEIN: 6.3 GM/DL (ref 6.4–8.2)
TRICHOMONAS: ABNORMAL /HPF
UROBILINOGEN, URINE: NEGATIVE MG/DL (ref 0.2–1)
WBC # BLD: 3.8 K/CU MM (ref 4–10.5)
WBC UA: 2 /HPF (ref 0–5)

## 2021-07-04 PROCEDURE — 80053 COMPREHEN METABOLIC PANEL: CPT

## 2021-07-04 PROCEDURE — 99283 EMERGENCY DEPT VISIT LOW MDM: CPT

## 2021-07-04 PROCEDURE — 83690 ASSAY OF LIPASE: CPT

## 2021-07-04 PROCEDURE — 85025 COMPLETE CBC W/AUTO DIFF WBC: CPT

## 2021-07-04 PROCEDURE — 96375 TX/PRO/DX INJ NEW DRUG ADDON: CPT

## 2021-07-04 PROCEDURE — 6370000000 HC RX 637 (ALT 250 FOR IP): Performed by: PHYSICIAN ASSISTANT

## 2021-07-04 PROCEDURE — 96374 THER/PROPH/DIAG INJ IV PUSH: CPT

## 2021-07-04 PROCEDURE — 6360000002 HC RX W HCPCS: Performed by: PHYSICIAN ASSISTANT

## 2021-07-04 PROCEDURE — 84703 CHORIONIC GONADOTROPIN ASSAY: CPT

## 2021-07-04 PROCEDURE — 96372 THER/PROPH/DIAG INJ SC/IM: CPT

## 2021-07-04 PROCEDURE — 81001 URINALYSIS AUTO W/SCOPE: CPT

## 2021-07-04 RX ORDER — SCOLOPAMINE TRANSDERMAL SYSTEM 1 MG/1
PATCH, EXTENDED RELEASE TRANSDERMAL
Qty: 10 PATCH | Refills: 0 | Status: SHIPPED | OUTPATIENT
Start: 2021-07-04 | End: 2022-10-25

## 2021-07-04 RX ORDER — PROMETHAZINE HYDROCHLORIDE 25 MG/ML
25 INJECTION, SOLUTION INTRAMUSCULAR; INTRAVENOUS ONCE
Status: COMPLETED | OUTPATIENT
Start: 2021-07-04 | End: 2021-07-04

## 2021-07-04 RX ORDER — PROMETHAZINE HYDROCHLORIDE 25 MG/1
25 SUPPOSITORY RECTAL EVERY 6 HOURS PRN
Qty: 6 SUPPOSITORY | Refills: 0 | Status: SHIPPED | OUTPATIENT
Start: 2021-07-04 | End: 2021-07-11

## 2021-07-04 RX ORDER — HEPARIN SODIUM (PORCINE) LOCK FLUSH IV SOLN 100 UNIT/ML 100 UNIT/ML
100 SOLUTION INTRAVENOUS ONCE
Status: COMPLETED | OUTPATIENT
Start: 2021-07-04 | End: 2021-07-04

## 2021-07-04 RX ORDER — PROMETHAZINE HYDROCHLORIDE 25 MG/ML
25 INJECTION, SOLUTION INTRAMUSCULAR; INTRAVENOUS ONCE
Status: DISCONTINUED | OUTPATIENT
Start: 2021-07-04 | End: 2021-07-04

## 2021-07-04 RX ORDER — HALOPERIDOL 5 MG/ML
1 INJECTION INTRAMUSCULAR ONCE
Status: COMPLETED | OUTPATIENT
Start: 2021-07-04 | End: 2021-07-04

## 2021-07-04 RX ORDER — OXYCODONE HYDROCHLORIDE AND ACETAMINOPHEN 5; 325 MG/1; MG/1
1 TABLET ORAL ONCE
Status: COMPLETED | OUTPATIENT
Start: 2021-07-04 | End: 2021-07-04

## 2021-07-04 RX ORDER — 0.9 % SODIUM CHLORIDE 0.9 %
1000 INTRAVENOUS SOLUTION INTRAVENOUS ONCE
Status: DISCONTINUED | OUTPATIENT
Start: 2021-07-04 | End: 2021-07-04

## 2021-07-04 RX ORDER — DICYCLOMINE HYDROCHLORIDE 10 MG/ML
20 INJECTION INTRAMUSCULAR ONCE
Status: COMPLETED | OUTPATIENT
Start: 2021-07-04 | End: 2021-07-04

## 2021-07-04 RX ORDER — ONDANSETRON 2 MG/ML
4 INJECTION INTRAMUSCULAR; INTRAVENOUS EVERY 30 MIN PRN
Status: DISCONTINUED | OUTPATIENT
Start: 2021-07-04 | End: 2021-07-04 | Stop reason: HOSPADM

## 2021-07-04 RX ORDER — DIPHENHYDRAMINE HCL 25 MG
25 TABLET ORAL ONCE
Status: COMPLETED | OUTPATIENT
Start: 2021-07-04 | End: 2021-07-04

## 2021-07-04 RX ADMIN — HALOPERIDOL LACTATE 1 MG: 5 INJECTION, SOLUTION INTRAMUSCULAR at 15:51

## 2021-07-04 RX ADMIN — HEPARIN 100 UNITS: 100 SYRINGE at 18:40

## 2021-07-04 RX ADMIN — OXYCODONE HYDROCHLORIDE AND ACETAMINOPHEN 1 TABLET: 5; 325 TABLET ORAL at 15:27

## 2021-07-04 RX ADMIN — DIPHENHYDRAMINE HCL 25 MG: 25 TABLET ORAL at 17:56

## 2021-07-04 RX ADMIN — DICYCLOMINE HYDROCHLORIDE 20 MG: 20 INJECTION, SOLUTION INTRAMUSCULAR at 15:26

## 2021-07-04 RX ADMIN — ONDANSETRON 4 MG: 2 INJECTION INTRAMUSCULAR; INTRAVENOUS at 15:26

## 2021-07-04 RX ADMIN — PROMETHAZINE HYDROCHLORIDE 25 MG: 25 INJECTION INTRAMUSCULAR; INTRAVENOUS at 15:26

## 2021-07-04 ASSESSMENT — PAIN SCALES - GENERAL
PAINLEVEL_OUTOF10: 6
PAINLEVEL_OUTOF10: 6

## 2021-07-04 NOTE — ED PROVIDER NOTES
Patient Identification  Sj Qureshi is a 44 y.o. female    Chief Complaint  Abdominal Pain and Nausea      HPI  (History provided by patient)  This is a 44 y.o. female with a history of chronic pancreatitis, fibromyalgia, gastric ulcer, GERD, sleeve gastrectomy, laparoscopic cholecystectomy, appendectomy, LAP-BAND, hysterectomy who was brought in by self for chief complaint of abdominal pain, nausea. Onset is acute on chronic. Worse over the last 2 days. Pain is located diffusely throughout the abdomen has been associated with multiple episodes of nonbilious nonbloody vomiting and diarrhea. She denies any fevers. Has history of similar episodes in the past.  States she has been unable to take her home Percocet and Phenergan and Bentyl and Zofran secondary to vomiting. States she has a history of biliary reflux, is following with general surgery was planning to do a Jet-en-Y procedure on her within the next few weeks to, per patient, correct her biliary reflux and chronic pain and assist her in weight loss. REVIEW OF SYSTEMS    Constitutional:  Denies fever, chills  HENT:  Denies sore throat or ear pain   Eyes: Denies vision changes, eye pain  Cardiovascular:  Denies chest pain, syncope  Respiratory:  Denies shortness of breath, cough   GI:  + abdominal pain, nausea, vomiting  :  Denies dysuria, discharge  Musculoskeletal:  Denies back pain, joint pain  Skin:  Denies rash, pruritis  Neurologic:  Denies focal weakness, or sensory changes. + GRULLON    See HPI and nursing notes for additional information     I have reviewed the following nursing documentation:  Allergies:    Allergies   Allergen Reactions    Aspirin Palpitations     \"My Heart Rate Elevates\"    Shellfish-Derived Products Swelling    Toradol [Ketorolac Tromethamine] Rash    Compazine [Prochlorperazine]     Reglan [Metoclopramide] Itching       Past medical history:  has a past medical history of Acid reflux, Anemia, Anxiety, ACCESS CATHETER (N/A, 8/15/2019); Upper gastrointestinal endoscopy (N/A, 10/14/2019); [address] Surgery (N/A, 1/15/2020); [address] Surgery (N/A, 7/6/2020); Upper gastrointestinal endoscopy (N/A, 7/17/2020); Leg biopsy excision (Left, 3/8/2021); and Upper gastrointestinal endoscopy (N/A, 5/28/2021). Home medications:   Prior to Admission medications    Medication Sig Start Date End Date Taking?  Authorizing Provider   famotidine (PEPCID) 20 MG tablet Take 1 tablet by mouth 2 times daily 7/2/21   Rajendra De Anda, DO   dicyclomine (BENTYL) 10 MG capsule Take 1 capsule by mouth 3 times daily As needed for abdominal pain 7/2/21   Rajendra De Anda, DO   ondansetron (ZOFRAN ODT) 4 MG disintegrating tablet Take 1 tablet by mouth every 8 hours as needed for Nausea 7/2/21   Rajendra De Anda, DO   promethazine (PHENERGAN) 25 MG tablet Take 1 tablet by mouth every 6 hours as needed for Nausea 7/2/21 7/9/21  Rajendra De Anda, DO   calcium carbonate (ANTACID) 500 MG chewable tablet Take 1 tablet by mouth daily 7/2/21 8/1/21  Rajendra De Anda, DO   famotidine (PEPCID) 20 MG tablet Take 1 tablet by mouth 2 times daily 7/2/21   Rajendra De Anda, DO   dicyclomine (BENTYL) 10 MG capsule Take 1 capsule by mouth 3 times daily As needed for abdominal pain 7/2/21   Rajendra De Anda, DO   ondansetron (ZOFRAN ODT) 4 MG disintegrating tablet Take 1 tablet by mouth every 8 hours as needed for Nausea 7/2/21   Rajendra De Anda, DO   calcium carbonate (ANTACID) 500 MG chewable tablet Take 1 tablet by mouth daily 7/2/21 8/1/21  Rajendra De Anda, DO   promethazine (PHENERGAN) 25 MG tablet Take 1 tablet by mouth every 6 hours as needed for Nausea 7/2/21 7/9/21  Rajendra De Anda, DO   ondansetron (ZOFRAN ODT) 4 MG disintegrating tablet Take 1 tablet by mouth every 8 hours as needed for Nausea 6/17/21   Rajendra De Anda, DO   famotidine (PEPCID) 20 MG tablet Take 1 tablet by mouth 2 times daily 6/17/21   Rajendra De Anda,    dicyclomine (BENTYL) 10 MG capsule Take 1 capsule by mouth 3 times daily As needed for abdominal pain 6/17/21   Garima Barber DO   calcium carbonate (ANTACID) 500 MG chewable tablet Take 1 tablet by mouth daily 6/17/21 7/17/21  Garima Barber DO   cholestyramine light 4 g packet Take 1 packet by mouth 3 times daily 5/30/21   Mayda Members, MD   Melatonin 10 MG TABS Take 10-20 mg by mouth nightly as needed    Historical Provider, MD   Cyanocobalamin (VITAMIN B 12 PO) Take 1 tablet by mouth daily    Historical Provider, MD   meclizine (ANTIVERT) 25 MG tablet Take 25 mg by mouth 3 times daily as needed for Dizziness    Historical Provider, MD   dicyclomine (BENTYL) 10 MG capsule Take 1 capsule by mouth 3 times daily as needed (abdominal pain) 5/13/21   Shala Carlos MD   hydrOXYzine (VISTARIL) 50 MG capsule Take 1 capsule by mouth every 6 hours as needed for Anxiety 5/13/21   Shala Carlos MD   levETIRAcetam (KEPPRA) 1000 MG tablet Take 1 tablet by mouth 2 times daily 5/13/21   Shala Carlos MD   levothyroxine (SYNTHROID) 125 MCG tablet Take 1 tablet by mouth Daily 5/13/21   Shala Carlos MD   atenolol (TENORMIN) 25 MG tablet Take 1 tablet by mouth daily 5/13/21   Shala Carlos MD   cloNIDine (CATAPRES) 0.1 MG tablet Take 1 tablet by mouth 2 times daily  Patient taking differently: Take 0.1 mg by mouth 2 times daily as needed for High Blood Pressure 05/27/21 Patient states she only takes as needed usually if SBP>150 5/13/21   Shala Carlos MD   estradiol (ESTRACE) 0.5 MG tablet Take 1 tablet by mouth daily  Patient taking differently: Take 0.5 mg by mouth nightly  5/13/21   Shala Carlos MD   PARoxetine (PAXIL) 30 MG tablet Take 1.5 tablets by mouth nightly  Patient taking differently: Take 30 mg by mouth nightly  5/13/21   Shala Carlos MD   pantoprazole (PROTONIX) 40 MG tablet Take 1 tablet by mouth daily (with breakfast) 5/11/21   Aram Zhao MD   albuterol (PROVENTIL) (2.5 MG/3ML) 0.083% nebulizer solution Take 3 mLs by nebulization every 6 hours as needed for Wheezing 4/7/21   Tommie Leahy, APRN - CNP   betamethasone valerate (VALISONE) 0.1 % ointment APPLY OINTMENT TO AFFECTED AREA TWICE DAILY TO HANDS AND ELBOWS FOR 21 DAYS 3/26/21   Historical Provider, MD   EQ PINK-BISMUTH 262 MG chewable tablet CHEW & SWALLOW 2 TABLETS BY MOUTH 4 TIMES DAILY BEFORE MEAL(S) AND NIGHTLY FOR 5 DAYS 3/26/21   Historical Provider, MD   ondansetron (ZOFRAN-ODT) 4 MG disintegrating tablet DISSOLVE 1 TABLET IN MOUTH EVERY 8 HOURS AS NEEDED FOR NAUSEA OR VOMITING 3/26/21   Historical Provider, MD   scopolamine (TRANSDERM-SCOP) transdermal patch APPLY 1 PATCH TRANSDERMALLY TO THE SKIN BEHIND THE EAR ONCE EVERY 3 DAYS AS NEEDED 1/4/21   Historical Provider, MD   potassium gluconate 550 mg tablet Take 1 tablet by mouth daily 3/25/21   Historical Provider, MD   tiZANidine (ZANAFLEX) 4 MG tablet Take 4 mg by mouth every 8 hours as needed  3/3/21   Historical Provider, MD   albuterol sulfate  (90 Base) MCG/ACT inhaler Inhale 2 puffs into the lungs 4 times daily 2/8/21   Chitra Catherine MD   ferrous sulfate (IRON 325) 325 (65 Fe) MG tablet Take 1 tablet by mouth daily (with breakfast) 10/23/20   Phyllis Graham MD   oxyCODONE-acetaminophen (PERCOCET) 5-325 MG per tablet Take 1 tablet by mouth. Every 4-6 hours PRN    Historical Provider, MD   Cholecalciferol (VITAMIN D3) 5000 units TABS Take 1 tablet by mouth daily    Historical Provider, MD   Multiple Vitamin (MVI, BARIATRIC ADVANTAGE MULTI-FORMULA, CHEW TAB) Take 1 tablet by mouth daily 2/22/19   Phyllis Graham MD   Calcium Citrate-Vitamin D (CALCIUM + VIT D, BARIATRIC ADVANTAGE, CHEWABLE TABLET) Take 1 tablet by mouth 2 times daily 2/22/19   Phyllis Graham MD   gabapentin (NEURONTIN) 800 MG tablet Take 800 mg by mouth 3 times daily    Historical Provider, MD       Social history:  reports that she has never smoked.  She has never used smokeless tobacco. She reports that she does not drink alcohol and does not use drugs. Family history:    Family History   Problem Relation Age of Onset    Diabetes Mother         \"Borderline Diabetes\"    High Blood Pressure Mother     Obesity Mother     Arthritis Mother     Heart Disease Mother     High Cholesterol Mother     Vision Loss Mother     Diabetes Father     High Blood Pressure Father     Asthma Father     Cancer Father         prostate cancer    High Blood Pressure Brother     Asthma Son     Vision Loss Son     Lupus Daughter     Other Daughter         \"Alot Of Female Problems\"         Exam  /70   Pulse 92   Temp 98.6 °F (37 °C) (Oral)   Resp 19   Ht 5' 4\" (1.626 m)   Wt 266 lb (120.7 kg)   SpO2 92%   BMI 45.66 kg/m²   Nursing note and vitals reviewed. Constitutional: Well developed, well nourished. No acute distress. HENT:      Head: Normocephalic and atraumatic. Ears: External ears normal.      Nose: Nose normal.     Mouth: Membrane mucosa moist and pink. No posterior oropharynx erythema or tonsillar edema  Eyes: Anicteric sclera. No discharge, PERRL  Neck: Supple. Trachea midline. Cardiovascular: RRR, no murmurs, rubs, or gallops, radial pulses 2+ bilaterally. Pulmonary/Chest: Effort normal. No respiratory distress. CTAB. No stridor. No wheezes. No rales. Abdominal: Soft. Minimal diffuse abdominal tenderness noted. No distension. No guarding, rebound tenderness, or evidence of ascites. : No CVA tenderness. Musculoskeletal: Moves all extremities. No gross deformity. Neurological: Alert and oriented to person, place, and time. Normal muscle tone. Skin: Warm and dry. No rash. Psychiatric: Normal mood and affect.  Behavior is normal.      Radiographs (if obtained):  [] The following radiograph was interpreted by myself in the absence of a radiologist:   [x] Radiologist's Report Reviewed:  No orders to display          Labs  Results for orders placed or performed during the hospital encounter of 07/04/21   CBC Auto Differential   Result Value Ref Range    WBC 3.8 (L) 4.0 - 10.5 K/CU MM    RBC 4.02 (L) 4.2 - 5.4 M/CU MM    Hemoglobin 10.7 (L) 12.5 - 16.0 GM/DL    Hematocrit 32.9 (L) 37 - 47 %    MCV 81.8 78 - 100 FL    MCH 26.6 (L) 27 - 31 PG    MCHC 32.5 32.0 - 36.0 %    RDW 14.6 11.7 - 14.9 %    Platelets 034 061 - 346 K/CU MM    MPV 10.1 7.5 - 11.1 FL    Differential Type AUTOMATED DIFFERENTIAL     Segs Relative 50.9 36 - 66 %    Lymphocytes % 34.3 24 - 44 %    Monocytes % 8.4 (H) 0 - 4 %    Eosinophils % 5.0 (H) 0 - 3 %    Basophils % 1.1 (H) 0 - 1 %    Segs Absolute 1.9 K/CU MM    Lymphocytes Absolute 1.3 K/CU MM    Monocytes Absolute 0.3 K/CU MM    Eosinophils Absolute 0.2 K/CU MM    Basophils Absolute 0.0 K/CU MM    Nucleated RBC % 0.0 %    Total Nucleated RBC 0.0 K/CU MM    Total Immature Neutrophil 0.01 K/CU MM    Immature Neutrophil % 0.3 0 - 0.43 %   CMP   Result Value Ref Range    Sodium 139 135 - 145 MMOL/L    Potassium 3.9 3.5 - 5.1 MMOL/L    Chloride 106 99 - 110 mMol/L    CO2 25 21 - 32 MMOL/L    BUN 22 6 - 23 MG/DL    CREATININE 0.8 0.6 - 1.1 MG/DL    Glucose 106 (H) 70 - 99 MG/DL    Calcium 9.6 8.3 - 10.6 MG/DL    Albumin 4.2 3.4 - 5.0 GM/DL    Total Protein 6.3 (L) 6.4 - 8.2 GM/DL    Total Bilirubin 0.1 0.0 - 1.0 MG/DL    ALT 13 10 - 40 U/L    AST 14 (L) 15 - 37 IU/L    Alkaline Phosphatase 92 40 - 129 IU/L    GFR Non-African American >60 >60 mL/min/1.73m2    GFR African American >60 >60 mL/min/1.73m2    Anion Gap 8 4 - 16   Lipase   Result Value Ref Range    Lipase 18 13 - 60 IU/L   Urinalysis   Result Value Ref Range    Color, UA YELLOW YELLOW    Clarity, UA HAZY (A) CLEAR    Glucose, Urine NEGATIVE NEGATIVE MG/DL    Bilirubin Urine NEGATIVE NEGATIVE MG/DL    Ketones, Urine NEGATIVE NEGATIVE MG/DL    Specific Gravity, UA 1.024 1.001 - 1.035    Blood, Urine NEGATIVE NEGATIVE    pH, Urine 5.0 5.0 - 8.0    Protein, UA NEGATIVE NEGATIVE MG/DL    Urobilinogen, Urine NEGATIVE 0.2 - 1.0 MG/DL    Nitrite Urine, Quantitative NEGATIVE NEGATIVE    Leukocyte Esterase, Urine NEGATIVE NEGATIVE    RBC, UA <1 0 - 6 /HPF    WBC, UA 2 0 - 5 /HPF    Bacteria, UA RARE (A) NEGATIVE /HPF    Squam Epithel, UA 3 /HPF    Mucus, UA RARE (A) NEGATIVE HPF    Trichomonas, UA NONE SEEN NONE SEEN /HPF   SPECIMEN REJECTION   Result Value Ref Range    Rejected Test HCGQL     Reason for Rejection NOT REQUIRED          MDM  Patient presents for abdominal pain, vomiting. She has been seen here including today 11 times in the ER this year, mostly for similar problems, has had multiple admissions, has had a large amount of abdominal surgeries. She reports she spoke to her her general surgeon on call today who told her to come in. Exam here is benign. Vital signs unremarkable. She does not appear clinically dehydrated. Laboratory testing is unremarkable. Patient received Zofran, Bentyl, Phenergan, Percocet initially, began vomiting again. Received Haldol here and vomiting improved but had some jitteriness, received oral Benadryl and symptoms now improved. She has been able to tolerate p.o. She has had no vomiting over 30 minutes after medications. I did speak with general surgeon on-call Dr. Flor Dash who recommended against performing any imaging on patient, she has had repeated imaging in the past, if able to tolerate p.o. can be discharged home, has follow-up with Dr. Branden Osei in 2 days. Discussed all this with patient. Plan is to discharge. She is comfortable with this. Clinical impression is chronic nausea and vomiting, abdominal pain. I estimate there is LOW risk for ACUTE APPENDICITIS, BOWEL OBSTRUCTION, CHOLECYSTITIS, DIVERTICULITIS, INCARCERATED HERNIA, PANCREATITIS, PELVIC INFLAMMATORY DISEASE, PERFORATED BOWEL, PERFORATED OR BLEEDING ULCER, ECTOPIC PREGNANCY, TUBO-OVARIAN ABSCESS, thus I consider the discharge disposition reasonable.  Andre Pope and I have discussed the diagnosis and risks, and we agree with discharging home to follow-up with their primary doctor. We also discussed returning to the Emergency Department immediately if new or worsening symptoms occur. We have discussed the symptoms which are most concerning (e.g., bloody stool, fever, changing or worsening pain, intractable vomiting) that necessitate immediate return. I have independently evaluated this patient. Final Impression  1. Chronic abdominal pain    2. Abdominal pain, unspecified abdominal location    3. Non-intractable vomiting with nausea, unspecified vomiting type        Blood pressure 137/70, pulse 92, temperature 98.6 °F (37 °C), temperature source Oral, resp. rate 19, height 5' 4\" (1.626 m), weight 266 lb (120.7 kg), SpO2 92 %, not currently breastfeeding. Disposition:  Discharge to home in stable condition. Patient was given scripts for the following medications. I counseled patient how to take these medications. New Prescriptions    No medications on file       This chart was generated using the [employer] dictation system. I created this record but it may contain dictation errors given the limitations of this technology.        Judy Rogers PA-C  07/04/21 9823

## 2021-07-04 NOTE — ED NOTES
Discharge instructions reviewed. Pt verbalized understanding.        Alesia Severin, RN  07/04/21 [id number]

## 2021-07-06 ENCOUNTER — HOSPITAL ENCOUNTER (OUTPATIENT)
Dept: INFUSION THERAPY | Age: 51
Discharge: HOME | End: 2021-07-06
Payer: COMMERCIAL

## 2021-07-06 ENCOUNTER — OFFICE VISIT (OUTPATIENT)
Dept: ONCOLOGY | Age: 51
End: 2021-07-06
Payer: COMMERCIAL

## 2021-07-06 ENCOUNTER — OFFICE VISIT (OUTPATIENT)
Dept: BARIATRICS/WEIGHT MGMT | Age: 51
End: 2021-07-06
Payer: COMMERCIAL

## 2021-07-06 VITALS
HEART RATE: 69 BPM | TEMPERATURE: 97.6 F | HEIGHT: 64 IN | WEIGHT: 272 LBS | OXYGEN SATURATION: 96 % | DIASTOLIC BLOOD PRESSURE: 73 MMHG | SYSTOLIC BLOOD PRESSURE: 139 MMHG | BODY MASS INDEX: 46.44 KG/M2

## 2021-07-06 VITALS
SYSTOLIC BLOOD PRESSURE: 114 MMHG | TEMPERATURE: 96.5 F | DIASTOLIC BLOOD PRESSURE: 72 MMHG | HEART RATE: 68 BPM | BODY MASS INDEX: 46.66 KG/M2 | WEIGHT: 273.3 LBS | HEIGHT: 64 IN | OXYGEN SATURATION: 97 %

## 2021-07-06 DIAGNOSIS — D50.9 IRON DEFICIENCY ANEMIA, UNSPECIFIED IRON DEFICIENCY ANEMIA TYPE: Primary | ICD-10-CM

## 2021-07-06 DIAGNOSIS — I10 ESSENTIAL HYPERTENSION: ICD-10-CM

## 2021-07-06 DIAGNOSIS — E66.01 MORBID OBESITY WITH BMI OF 45.0-49.9, ADULT (HCC): Primary | ICD-10-CM

## 2021-07-06 PROCEDURE — 1036F TOBACCO NON-USER: CPT | Performed by: SURGERY

## 2021-07-06 PROCEDURE — G8417 CALC BMI ABV UP PARAM F/U: HCPCS | Performed by: SURGERY

## 2021-07-06 PROCEDURE — 1036F TOBACCO NON-USER: CPT | Performed by: NURSE PRACTITIONER

## 2021-07-06 PROCEDURE — G8417 CALC BMI ABV UP PARAM F/U: HCPCS | Performed by: NURSE PRACTITIONER

## 2021-07-06 PROCEDURE — 3017F COLORECTAL CA SCREEN DOC REV: CPT | Performed by: SURGERY

## 2021-07-06 PROCEDURE — G8427 DOCREV CUR MEDS BY ELIG CLIN: HCPCS | Performed by: NURSE PRACTITIONER

## 2021-07-06 PROCEDURE — 99213 OFFICE O/P EST LOW 20 MIN: CPT | Performed by: NURSE PRACTITIONER

## 2021-07-06 PROCEDURE — 99213 OFFICE O/P EST LOW 20 MIN: CPT | Performed by: SURGERY

## 2021-07-06 PROCEDURE — G8427 DOCREV CUR MEDS BY ELIG CLIN: HCPCS | Performed by: SURGERY

## 2021-07-06 PROCEDURE — 3017F COLORECTAL CA SCREEN DOC REV: CPT | Performed by: NURSE PRACTITIONER

## 2021-07-06 PROCEDURE — 99211 OFF/OP EST MAY X REQ PHY/QHP: CPT

## 2021-07-06 RX ORDER — METRONIDAZOLE 500 MG/1
TABLET ORAL
COMMUNITY
Start: 2021-04-27 | End: 2021-07-27 | Stop reason: ALTCHOICE

## 2021-07-06 RX ORDER — SENNOSIDES AND DOCUSATE SODIUM 8.6; 5 MG/1; MG/1
TABLET, FILM COATED ORAL
Status: ON HOLD | COMMUNITY
Start: 2021-07-05 | End: 2021-08-18 | Stop reason: HOSPADM

## 2021-07-06 ASSESSMENT — PATIENT HEALTH QUESTIONNAIRE - PHQ9
SUM OF ALL RESPONSES TO PHQ QUESTIONS 1-9: 1
SUM OF ALL RESPONSES TO PHQ QUESTIONS 1-9: 1
2. FEELING DOWN, DEPRESSED OR HOPELESS: 1
SUM OF ALL RESPONSES TO PHQ QUESTIONS 1-9: 1

## 2021-07-06 ASSESSMENT — ENCOUNTER SYMPTOMS
WHEEZING: 0
COUGH: 0
COLOR CHANGE: 0
BLOOD IN STOOL: 0
TROUBLE SWALLOWING: 0
VOICE CHANGE: 0
DIARRHEA: 0
CONSTIPATION: 0
VOMITING: 1
SHORTNESS OF BREATH: 0
ANAL BLEEDING: 0
PHOTOPHOBIA: 0
ABDOMINAL PAIN: 1
NAUSEA: 1
SORE THROAT: 0

## 2021-07-06 NOTE — PROGRESS NOTES
MA Rooming Questions  Patient: Mague Lowery  MRN: [id number]    Date: 7/6/2021        1. Do you have any new issues? yes - patient complains of joint pain and pain in the abdomen and vomitting          2. Do you need any refills on medications?    no    3. Have you had any imaging done since your last visit? yes - [facility]    4. Have you been hospitalized or seen in the emergency room since your last visit here?   yes - [facility]    5. Did the patient have a depression screening completed today?  Yes    PHQ-9 Total Score: 1 (7/6/2021  2:20 PM)       PHQ-9 Given to (if applicable):               PHQ-9 Score (if applicable):                     [] Positive     []  Negative              Does question #9 need addressed (if applicable)                     [] Yes    []  No               Kiersten Westfall CMA

## 2021-07-06 NOTE — PROGRESS NOTES
sleeve gastrectomy     [place] (hard of hearing)     Right Ear    Hx of cardiac catheterization 10/24/2010    Angiographically normal coronary arteries w/ normal LV function and wall motion.  Hx of transesophageal echocardiography (PILO) for monitoring 12/02/2010    EF 55-60%. WNL.    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 name   Gradie Elmo cysts     \"they found that I have a kidney cyst but not sure which side\" per pt on 7/15/2020    Lupus ([address] Dx 2013    \"Rheumatoid Lupus\"    MDRO (multiple drug resistant organisms) resistance     4 months ago chest from mediport    Morbid obesity ([address]     MRSA bacteremia     Nausea     \"Most Of The Time\"    Osteomyelitis of fifth toe of left foot ([address]     Pain management     Sees Dr. Ashleigh Moses, Once A Month    Pancreatitis chronic Dx 2001    Pneumonia Dx 11-15    Seizures ([address]     Shortness of breath on exertion     Shortness of breath on exertion     Sleep apnea     Has CPAP\"no longer use the cpap since lost weight\"    Staph infection Dx 11-15    Left Foot    Staph infection Dx 11-15    \"Left Foot\"    Teeth missing     Upper And Lower    Thyroid disease     Wears glasses     To Read        Past Surgical History:   Procedure Laterality Date    APPENDECTOMY  02/1998    Done When Tubes And Ovaries Were Removed    CARDIAC CATHETERIZATION  10/24/2010    CATHETER REMOVAL N/A 7/16/2019    PORT REMOVAL performed by Nava Cross MD at [address] [address]  08/27/1991    CHOLECYSTECTOMY, LAPAROSCOPIC  1990's    COLONOSCOPY  Last Done In 2000's    DENTAL SURGERY      Teeth Extracted In Past    ENDOSCOPY, COLON, DIAGNOSTIC  Last Done In 2018   [facility] Left 6/16/2019    FIRST METATARSAL DEBRIDEMENT INCISION AND DRAINAGE. EXCISION OF ULCER.  RESECTION OF BONE. 1ST METATARSAL POWER LAVAGE AND BONE CEMENT performed by Luis Manuel Lacey DPM at [address] [facility] Left Last Done In 2016     Dr. Neftali Nichols, \" About 6 Surgeries Done Because Of Staph Infection\"    HIATAL HERNIA REPAIR N/A 2/12/2019    HERNIA HIATAL LAPAROSCOPIC ROBOTIC performed by Margaret Rey MD at [facility]  10/1997    Partial Abdominal Hysterectomy    INSERTION / REMOVAL / REPLACEMENT VENOUS ACCESS CATHETER N/A 8/15/2019    PORT INSERTION performed by Margaret Rey MD at [address] [place]    removed after 6 months    LEG BIOPSY EXCISION Left 3/8/2021    LEFT THIGH LESION BIOPSY EXCISION performed by Margaret Rey MD at [facility], PARTIAL Left 2008    Benign    OTHER SURGICAL HISTORY  02/1998    \"Tubes And Ovaries Removed, Appendectomy Also Done\"    OTHER SURGICAL HISTORY  Last Done 7-15-16    Mediport Insertion \"Total Of Six Done, Removed Last Mediport In 2014\"    PORT SURGERY N/A 1/15/2020    PORT REMOVAL performed by Margaret Rey MD at [address] [place] N/A 7/6/2020    PORT INSERTION performed by Margaret Rey MD at [facility] N/A 2/12/2019    GASTRECTOMY SLEEVE LAPAROSCOPIC ROBOTIC performed by Margaret Rey MD at [address] [place] [address]  08/27/2018    UPPER GASTROINTESTINAL ENDOSCOPY N/A 4/2/2019    EGD CONTROL HEMORRHAGE with epi injection at bleeding site performed by Margaret Rey MD at [address] [place] [address] 6/19/2019    EGD DIAGNOSTIC ONLY performed by Margaret Rey MD at [address] [place] [address] 7/22/2019    EGD DIAGNOSTIC ONLY performed by Tayo Richard MD at [address] [place] [address] 10/14/2019    EGD DIAGNOSTIC ONLY performed by Kim Marcelo MD at [address] [place] [address] N/A 7/17/2020    EGJ DILATATION BALLOON WITH 18-20 MM BALLOON, DILATED TO 20 MM. performed by Kim Marcelo MD at [address] [place] [address] N/A 5/28/2021    EGD BIOPSY performed by Kim Marcelo MD at [facility] History     Socioeconomic History    Marital status:      Spouse name: Not on file    Number of children: Not on file    Years of education: Not on file    Highest education level: Not on file   Occupational History    Not on file   Tobacco Use    Smoking status: Never Smoker    Smokeless tobacco: Never Used   Vaping Use    Vaping Use: Never used   Substance and Sexual Activity    Alcohol use: No     Alcohol/week: 0.0 standard drinks    Drug use: No    Sexual activity: Not Currently   Other Topics Concern    Not on file   Social History Narrative    Not on file     Social Determinants of Health     Financial Resource Strain:     Difficulty of Paying Living Expenses:    Food Insecurity:     Worried About Running Out of Food in the Last Year:     Ran Out of Food in the Last Year:    Transportation Needs:     Lack of Transportation (Medical):  Lack of Transportation (Non-Medical):    Physical Activity:     Days of Exercise per Week:     Minutes of Exercise per Session:    Stress:     Feeling of Stress :    Social Connections:     Frequency of Communication with Friends and Family:     Frequency of Social Gatherings with Friends and Family:     Attends [employer] Services:     Active Member of Clubs or Organizations:     Attends Club or Organization Meetings:     Marital Status:    Intimate Partner Violence:     Fear of Current or Ex-Partner:     Emotionally Abused:     Physically Abused:     Sexually Abused:          Allergies   Allergen Reactions    Aspirin Palpitations     \"My Heart Rate Elevates\"    Shellfish-Derived Products Swelling    Toradol [Ketorolac Tromethamine] Rash    Compazine [Prochlorperazine]     Reglan [Metoclopramide] Itching        Family History   Problem Relation Age of Onset    Diabetes Mother         \"Borderline Diabetes\"    High Blood Pressure Mother     Obesity Mother     Arthritis Mother     Heart Disease Mother     High Cholesterol Mother     Vision Loss Mother     Diabetes Father     High Blood Pressure Father     Asthma Father     Cancer Father         prostate cancer    High Blood Pressure Brother     Asthma Son     Vision Loss Son     Lupus Daughter     Other Daughter         \"Alot Of Female Problems\"       Current Outpatient Medications   Medication Sig Dispense Refill    SM SENNA-S 8.6-50 MG per tablet       metroNIDAZOLE (FLAGYL) 500 MG tablet TAKE 1 TABLET BY MOUTH EVERY 8 HOURS FOR 7 DAYS      promethazine (PHENERGAN) 25 MG suppository Place 1 suppository rectally every 6 hours as needed for Nausea WARNING:  May cause drowsiness. May impair ability to operate vehicles or machinery. Do not use in combination with alcohol.  6 suppository 0    scopolamine (TRANSDERM-SCOP) transdermal patch APPLY 1 PATCH TRANSDERMALLY TO THE SKIN BEHIND THE EAR ONCE EVERY 3 DAYS AS NEEDED 10 patch 0    dicyclomine (BENTYL) 10 MG capsule Take 1 capsule by mouth 3 times daily As needed for abdominal pain 15 capsule 3    promethazine (PHENERGAN) 25 MG tablet Take 1 tablet by mouth every 6 hours as needed for Nausea 30 tablet 0    dicyclomine (BENTYL) 10 MG capsule Take 1 capsule by mouth 3 times daily As needed for abdominal pain 15 capsule 3    calcium carbonate (ANTACID) 500 MG chewable tablet Take 1 tablet by mouth daily 30 tablet 0    cholestyramine light 4 g packet Take 1 packet by mouth 3 times daily 60 packet 3    Melatonin 10 MG TABS Take 10-20 mg by mouth nightly as needed      Cyanocobalamin (VITAMIN B 12 PO) Take 1 tablet by mouth daily      meclizine (ANTIVERT) 25 MG tablet Take 25 mg by mouth 3 times daily as needed for Dizziness      hydrOXYzine (VISTARIL) 50 MG capsule Take 1 capsule by mouth every 6 hours as needed for Anxiety 30 capsule 3    levETIRAcetam (KEPPRA) 1000 MG tablet Take 1 tablet by mouth 2 times daily 60 tablet 3    levothyroxine (SYNTHROID) 125 MCG tablet Take 1 tablet by mouth Daily 30 tablet 3    atenolol (TENORMIN) 25 MG tablet Take 1 tablet by mouth daily 30 tablet 3    cloNIDine (CATAPRES) 0.1 MG tablet Take 1 tablet by mouth 2 times daily (Patient taking differently: Take 0.1 mg by mouth 2 times daily as needed for High Blood Pressure 05/27/21 Patient states she only takes as needed usually if SBP>150) 60 tablet 3    estradiol (ESTRACE) 0.5 MG tablet Take 1 tablet by mouth daily (Patient taking differently: Take 0.5 mg by mouth nightly ) 21 tablet 3    PARoxetine (PAXIL) 30 MG tablet Take 1.5 tablets by mouth nightly (Patient taking differently: Take 30 mg by mouth nightly ) 30 tablet 3    pantoprazole (PROTONIX) 40 MG tablet Take 1 tablet by mouth daily (with breakfast) 30 tablet 3    albuterol (PROVENTIL) (2.5 MG/3ML) 0.083% nebulizer solution Take 3 mLs by nebulization every 6 hours as needed for Wheezing 120 each 0    betamethasone valerate (VALISONE) 0.1 % ointment APPLY OINTMENT TO AFFECTED AREA TWICE DAILY TO HANDS AND ELBOWS FOR 21 DAYS      EQ PINK-BISMUTH 262 MG chewable tablet CHEW & SWALLOW 2 TABLETS BY MOUTH 4 TIMES DAILY BEFORE MEAL(S) AND NIGHTLY FOR 5 DAYS      ondansetron (ZOFRAN-ODT) 4 MG disintegrating tablet DISSOLVE 1 TABLET IN MOUTH EVERY 8 HOURS AS NEEDED FOR NAUSEA OR VOMITING      potassium gluconate 550 mg tablet Take 1 tablet by mouth daily      tiZANidine (ZANAFLEX) 4 MG tablet Take 4 mg by mouth every 8 hours as needed       albuterol sulfate  (90 Base) MCG/ACT inhaler Inhale 2 puffs into the lungs 4 times daily 1 Inhaler 5    ferrous sulfate (IRON 325) 325 (65 Fe) MG tablet Take 1 tablet by mouth daily (with breakfast) 90 tablet 5    oxyCODONE-acetaminophen (PERCOCET) 5-325 MG per tablet Take 1 tablet by mouth. Every 4-6 hours PRN      Cholecalciferol (VITAMIN D3) 5000 units TABS Take 1 tablet by mouth daily      Multiple Vitamin (MVI, BARIATRIC ADVANTAGE MULTI-FORMULA, CHEW TAB) Take 1 tablet by mouth daily 30 tablet 5    Calcium Citrate-Vitamin D (CALCIUM + VIT D, BARIATRIC ADVANTAGE, CHEWABLE TABLET) Take 1 tablet by mouth 2 times daily 120 tablet 5    gabapentin (NEURONTIN) 800 MG tablet Take 800 mg by mouth 3 times daily      famotidine (PEPCID) 20 MG tablet Take 1 tablet by mouth 2 times daily 14 tablet 0    ondansetron (ZOFRAN ODT) 4 MG disintegrating tablet Take 1 tablet by mouth every 8 hours as needed for Nausea 15 tablet 0    calcium carbonate (ANTACID) 500 MG chewable tablet Take 1 tablet by mouth daily 30 tablet 0    famotidine (PEPCID) 20 MG tablet Take 1 tablet by mouth 2 times daily 14 tablet 0    dicyclomine (BENTYL) 10 MG capsule Take 1 capsule by mouth 3 times daily As needed for abdominal pain 15 capsule 3    ondansetron (ZOFRAN ODT) 4 MG disintegrating tablet Take 1 tablet by mouth every 8 hours as needed for Nausea 15 tablet 0    calcium carbonate (ANTACID) 500 MG chewable tablet Take 1 tablet by mouth daily 30 tablet 0    ondansetron (ZOFRAN ODT) 4 MG disintegrating tablet Take 1 tablet by mouth every 8 hours as needed for Nausea 15 tablet 0    famotidine (PEPCID) 20 MG tablet Take 1 tablet by mouth 2 times daily 14 tablet 0    dicyclomine (BENTYL) 10 MG capsule Take 1 capsule by mouth 3 times daily as needed (abdominal pain) 21 capsule 3     No current facility-administered medications for this visit. Review of Systems   Constitutional: Negative for activity change, chills, diaphoresis and fever. HENT: Negative for sore throat, trouble swallowing and voice change. Eyes: Negative for photophobia and visual disturbance. Respiratory: Negative for cough, shortness of breath and wheezing. Cardiovascular: Negative for chest pain, palpitations and leg swelling. Gastrointestinal: Positive for abdominal pain, nausea and vomiting. Negative for anal bleeding, blood in stool, constipation and diarrhea. Endocrine: Negative for cold intolerance, heat intolerance, polydipsia and polyuria. Genitourinary: Negative for dysuria, frequency and hematuria. Musculoskeletal: Negative for joint swelling, myalgias and neck stiffness. Skin: Negative for color change and rash. Neurological: Negative for seizures, speech difficulty, light-headedness and numbness. Hematological: Negative for adenopathy. Does not bruise/bleed easily. OBJECTIVE:    /72   Pulse 68   Temp 96.5 °F (35.8 °C)   Ht 5' 4.25\" (1.632 m)   Wt 273 lb 4.8 oz (124 kg)   SpO2 97%   BMI 46.55 kg/m²    Body mass index is 46.55 kg/m². Physical Exam  Vitals reviewed. Constitutional:       General: She is not in acute distress. Appearance: She is well-developed. She is not diaphoretic. HENT:      Head: Normocephalic and atraumatic. Eyes:      General: No scleral icterus. Conjunctiva/sclera: Conjunctivae normal.      Pupils: Pupils are equal, round, and reactive to light. Neck:      Thyroid: No thyromegaly. Vascular: No JVD. Trachea: No tracheal deviation. Cardiovascular:      Rate and Rhythm: Normal rate and regular rhythm. Heart sounds: Normal heart sounds. No murmur heard. No friction rub. No gallop. Pulmonary:      Effort: Pulmonary effort is normal. No respiratory distress. Breath sounds: No stridor. No wheezing or rales. Chest:      Chest wall: No tenderness. Abdominal:      General: Bowel sounds are normal. There is no distension. Palpations: Abdomen is soft. There is no mass. Tenderness: There is abdominal tenderness. There is no guarding or rebound. Musculoskeletal:         General: No tenderness. Normal range of motion.       Cervical back: Normal range of motion. Lymphadenopathy:      Cervical: No cervical adenopathy. Skin:     General: Skin is warm and dry. Coloration: Skin is not pale. Findings: No erythema or rash. Neurological:      Mental Status: She is alert and oriented to person, place, and time. Cranial Nerves: No cranial nerve deficit. Coordination: Coordination normal.   Psychiatric:         Behavior: Behavior normal.         Thought Content: Thought content normal.         Judgment: Judgment normal.         Orders Placed This Encounter   Procedures    US DUP LOWER EXTREMITY RIGHT ELÍAS (aka DUPLEX)        ASSESSMENT & PLAN:    1. Morbid obesity with BMI of 45.0-49.9, adult (HCC)         Nausea, and vomiting. Erlinda Schuster NEEDS the RYGB, preauthorizing, and scheduled August 16th    Needs to take her Fe, and eats spinach she does. .    Patient counseled on the risks, benefits, and alternatives of treatment plan at length while in the office today. Patient states an understanding and willingness to proceed with the plan. Follow Up:  Return in about 6 weeks (around 8/17/2021) for Surgery.       Dinesh Robbins MD, FACS, FICS.    7/6/21

## 2021-07-06 NOTE — PROGRESS NOTES
Patient Name: Oscar Ocampo  Patient : 1970  Patient MRN: [id number]     Primary Oncologist: Marko Gomez MD  Referring Provider: Ayush Garcia MD, MD       Date of Service: 2021      Chief Complaint:   Chief Complaint   Patient presents with    Follow-up        Problem List: Patient Active Problem List:     Fibromyalgia     Lupus (systemic lupus erythematosus) ([address]     Chronic pancreatitis ([address]     HTN (hypertension)     Generalized abdominal pain     Frequent UTI     Gastroesophageal reflux disease without esophagitis     Depression     Fatty liver disease, nonalcoholic     Arthritis     Bilateral low back pain with left-sided sciatica     Intractable vomiting with nausea     Hiatal hernia     Status post laparoscopic sleeve gastrectomy     Pseudoseizure     Chronic pain syndrome     Drug-seeking/Aberrant behavior     Lymph node disorder     Morbid obesity with BMI of 40.0-44.9, adult ([address]     Iron deficiency anemia secondary to blood loss (chronic)     Encounter for weight management     Intractable nausea and vomiting     Seizure ([address]     Abdominal pain     Anxiety     RUQ pain     Intractable vomiting     Status post bariatric surgery     Morbid obesity with BMI of 45.0-49.9, adult ([address]     Discoloration of skin of flank resembling ecchymosis     Morbid obesity with BMI of 50.0-59.9, adult ([address]     Chest pain of uncertain etiology     Cellulitis of left thigh        HPI: Estrada Poole is a pleasant 44 y.o.  female patient with significant past medical history of gastric ulcer/AVM who presented to ER in 2018  with abdominal pain. On her admission she had normal CBC and Hg dropped during her stay. She had multiple EGD's in the past. Colonoscopy was several years ago. EGD X2 on this admission showed erosive gastritis with bright blood in the lumen. She received 4 units PRBC. She has underlying SLE/connective tissue disorder.  She was followed by surgeon for potential bariatric surgery. She had h/o of MRSA 2 years ago. CT abdomen in 8/2018:  1. No acute intra-abdominal abnormality to account for the patient's symptoms. 2. Status post cholecystectomy with pneumobilia, likely related to prior sphincterotomy. 3. Status post hysterectomy and appendectomy. She has positive RF. Acute viral hepatitis serology in November 2018 was negative. Final Pathologic Diagnosis in August 2018: Stomach, antrum, biopsy: - Mild chronic gastritis (see comment, see stains report). - Helicobacter pylori microorganisms are not identified. LDH and haptoglobin were within normal limits. Labs in September 2018 showed normal WBC, and platelet. Hg was ~11. She was on oral Iron before. On January 17, 2019 she came in for follow-up visit. She started taking iron pill since last time I have seen her. Hemoglobin has improved. Anemia has been corrected. WBC and platelet were within normal limits. She had gastric sleeve surgery on February 12, 2019 and lost ~ 50 lbs. 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 She was seen by ID and placed on IV antibitotic. At present time she is on oral antibiotic as per ID. Hg dropped slowly since than to ~ 8 mg/dL. She still takes oral Iron. CBC on September 4 showed Hg of 9.6. I advise to call for result of Iron. If Iron study showed low Iron, we will decide about parenteral Iron. She is on paxil for depression. She had Iron infusion in September and October 2019. In October 2019 ferritin and hemoglobin improved. I will check Iron study, CBC today and prior to next OV. CBC today on December 2, 2019 showed normal hemoglobin white cell and platelets. Advised to call for the results of iron study tomorrow. She had upper endoscopic study in October 2019 which showed normal study. Pathology report showed slight chronic inflammation and negative H. pylori. CT abdomen pelvis in October 2019 showed no acute abnormality.     She had MRSA bacteremia and [facility] was removed from Right anterior chest wall in 2020. She was recently hospitalized 3/29/21 for leg pain, and nausea. She had cellulitis early March 2021 with excision of left medial thigh skin lesion. Presents for scheduled follow up on July 6, 2021. She reports ongoing abdominal pain that radiates to her back. She has been seen in the ED 11 times this year for this complaint. She had non acute CT CP 7/2/21. She is planning for [facility] with Dr. Gatito Cisse. Per patient she is suspected to have biliary reflux and surgery may assist in decreasing her pain. We reviewed iron studies from 6/29. We will arrange for iron infusion in the next two weeks to assist with optimization prior to surgery. She denies fever, chills, night sweats, no unintetional weight loss, no nausea, vomiting, no diarrhea, denies acute pain or depression. Past Medical History:  Anemia, acid reflux, anxiety, osteoarthritis, bleeding stomach ulcer in 2014, chronic back pain, depression, fibromyalgia, multiple blood transfusions, hypertension, lupus, chronic pancreatitis, obesity, thyroid disease. Past Surgical History:  Appendectomy in February 1998, cardiac catheter in October 2010, C-sections in August 1991, laparoscopic cholecystectomy in the 90, colonoscopy, upper endoscopic study. Foot surgery, partial hysterectomy in October 1997, partial L lung removal for benign tumor in 2008. Tube and ovary removal in February 1998, tonsillectomy and adenoidectomy in 1975. Current medication:  Reviewed as per chart    Allergies:  Reviewed as per chart,    Social history:  She denies any alcohol use. Denies any history of smoking. Denies any illicit drug use. She has 2 children. Family history:  Daughter has lupus, maternal aunt had breast cancer at age 52, maternal grandmother had stomach cancer at age 80 and maternal grandfather had lung cancer. Review of Systems: \"Per interval history; otherwise 10 point ROS is negative. \"     Vital Signs: /73 (Site: Right Lower Arm, Position: Sitting, Cuff Size: Medium Adult)   Pulse 69   Temp 97.6 °F (36.4 °C) (Temporal)   Ht 5' 4\" (1.626 m)   Wt 272 lb (123.4 kg)   SpO2 96%   BMI 46.69 kg/m²     Physical Exam:    CONSTITUTIONAL: awake, alert, cooperative, no apparent distress   EYES:sclera clear and conjunctiva normal  ENT: Normocephalic, without obvious abnormality, atraumatic  NECK: supple, symmetrical, no jugular venous distension and no carotid bruits   HEMATOLOGIC/LYMPHATIC: no cervical, supraclavicular or axillary lymphadenopathy   LUNGS: no increased work of breathing and clear to auscultation   CARDIOVASCULAR: regular rate and rhythm, normal S1 and S2, no murmur noted  ABDOMEN: normal bowel sounds x 4, soft, non-distended, mild tenderness, no masses palpated, no hepatosplenomegaly   MUSCULOSKELETAL: full range of motion noted, tone is normal  NEUROLOGIC: awake, alert, oriented to name, place and time. Motor skills grossly intact. SKIN: Normal skin color, texture, turgor and no jaundice.  appears intact   EXTREMITIES: no LE edema     Labs:    Hematology:  Lab Results   Component Value Date    WBC 3.8 (L) 07/04/2021    RBC 4.02 (L) 07/04/2021    HGB 10.7 (L) 07/04/2021    HCT 32.9 (L) 07/04/2021    MCV 81.8 07/04/2021    MCH 26.6 (L) 07/04/2021    MCHC 32.5 07/04/2021    RDW 14.6 07/04/2021     07/04/2021    MPV 10.1 07/04/2021    BANDSPCT 4 (L) 08/05/2018    SEGSPCT 50.9 07/04/2021    EOSRELPCT 5.0 (H) 07/04/2021    BASOPCT 1.1 (H) 07/04/2021    LYMPHOPCT 34.3 07/04/2021    MONOPCT 8.4 (H) 07/04/2021    BANDABS 0.09 08/05/2018    SEGSABS 1.9 07/04/2021    EOSABS 0.2 07/04/2021    BASOSABS 0.0 07/04/2021    LYMPHSABS 1.3 07/04/2021    MONOSABS 0.3 07/04/2021    DIFFTYPE AUTOMATED DIFFERENTIAL 07/04/2021    ANISOCYTOSIS 1+ 08/05/2018    POLYCHROM 1+ 08/05/2018    PLTM PLATELETS APPEAR SLT DECREASED 08/05/2018     Lab Results   Component Value Date    ESR 25 03/03/2021 Chemistry:  Lab Results   Component Value Date     07/04/2021    K 3.9 07/04/2021     07/04/2021    CO2 25 07/04/2021    BUN 22 07/04/2021    CREATININE 0.8 07/04/2021    GLUCOSE 106 (H) 07/04/2021    CALCIUM 9.6 07/04/2021    PROT 6.3 (L) 07/04/2021    LABALBU 4.2 07/04/2021    BILITOT 0.1 07/04/2021    ALKPHOS 92 07/04/2021    AST 14 (L) 07/04/2021    ALT 13 07/04/2021    LABGLOM >60 07/04/2021    GFRAA >60 07/04/2021    PHOS 4.4 12/04/2015    MG 2.1 07/02/2021    POCGLU 102 (H) 03/06/2021     Lab Results   Component Value Date     08/05/2018     No components found for: LD  Lab Results   Component Value Date    TSHHS 5.560 (H) 07/02/2021    T4FREE 0.66 (L) 08/06/2018    FT3 2.5 11/26/2010       Immunology:  Lab Results   Component Value Date    PROT 6.3 (L) 07/04/2021    ALBUMINELP 2.2 (L) 11/18/2015    LABALPH 0.6 (H) 11/18/2015    LABALPH 1.3 (H) 11/18/2015    LABBETA 1.1 11/18/2015    GAMGLOB 0.8 11/18/2015     No results found for: Erich Wang, KLFLCR  No results found for: B2M    Coagulation Panel:  Lab Results   Component Value Date    PROTIME 10.4 (L) 02/12/2021    INR 0.86 02/12/2021    APTT 30.4 02/12/2021    DDIMER 1048 (H) 01/13/2020       Anemia Panel:  Lab Results   Component Value Date    TYBVTKKF40 >2000 (H) 06/17/2021    FOLATE 6.3 06/17/2021       Observations:  Performance Status: 0  Normal activity, fully active, able to carry on all pre-disease performance without restriction. Depression Status: PHQ-9 Total Score: 1 (7/6/2021  2:20 PM)    Cognitive Status: Oriented to person, time, and place     Assessment & Plan:    1. She has history of UGIB related to AVM/gastritis. H pylori was negative. She received 4 units of PRBC in August 2018. CBC in September showed improvement. She takes daily iron pill. Blood tests in January 2019 showed normal CBC. Ferritin was 45. She had Iron infusion in September and October 2019.   CBC on December 2, 2019 was unremarkable. Labs 6/21 reviewed. We will schedule for iron infusion in the next one to two weeks. 2. She had mediport replacement in September 2018 due to poor peripheral access. She was hospitalized at the time for MRSA. 3. I advise to keep age appropriate cancer screening up-to-date. She had gastric sleeve surgery on November 12, 2019.    4. She had MRSA and osteomyelitis of left foot. She was seen by ID and on antibiotic. 5.  She had mammogram in March 2020, and I recommend to follow up with Dr Tricia Glez to discuss about colonoscopy. Plans for [facility] in the next few weeks. Suspect abdominal pain is due to biliary reflux and may resolve after surgery. Return to clinic in 3 months or sooner. All of her question has been answered for today. Return to clinic in 3 months or sooner. All of her question has been answered for today. Recent imaging and labs were reviewed and discussed with the patient.       Electronically signed by MICHAEL Brar CNP on 4/2/21 at 3:47 PM EDT

## 2021-07-08 ENCOUNTER — TELEPHONE (OUTPATIENT)
Dept: BARIATRICS/WEIGHT MGMT | Age: 51
End: 2021-07-08

## 2021-07-08 NOTE — TELEPHONE ENCOUNTER
Central Scheduling called stating insurance will not pay for the US because the diagnosis code needs changed.  Please advise

## 2021-07-10 RX ORDER — PROMETHAZINE HYDROCHLORIDE 25 MG/1
TABLET ORAL
Qty: 30 TABLET | Refills: 0 | Status: SHIPPED | OUTPATIENT
Start: 2021-07-10 | End: 2021-09-12

## 2021-07-12 ENCOUNTER — TELEPHONE (OUTPATIENT)
Dept: BARIATRICS/WEIGHT MGMT | Age: 51
End: 2021-07-12

## 2021-07-12 DIAGNOSIS — D50.0 IRON DEFICIENCY ANEMIA DUE TO CHRONIC BLOOD LOSS: Primary | ICD-10-CM

## 2021-07-13 ENCOUNTER — TELEPHONE (OUTPATIENT)
Dept: BARIATRICS/WEIGHT MGMT | Age: 51
End: 2021-07-13

## 2021-07-13 ENCOUNTER — TELEPHONE (OUTPATIENT)
Dept: INFUSION THERAPY | Age: 51
End: 2021-07-13

## 2021-07-13 DIAGNOSIS — I82.A11 ACUTE DEEP VEIN THROMBOSIS (DVT) OF AXILLARY VEIN OF RIGHT UPPER EXTREMITY (HCC): Primary | ICD-10-CM

## 2021-07-13 NOTE — TELEPHONE ENCOUNTER
PHONED PT TO SCHEDULE IRON INFUSION. PT STATED SHE WAS INSTRUCTED BY HER FAMILY  TO GO TO ER.  SHE IS CURRENTLY VOMITING.   PT INSTRUCTED TO NOTIFY INFUSION UPON D/C

## 2021-07-16 ENCOUNTER — HOSPITAL ENCOUNTER (EMERGENCY)
Age: 51
Discharge: HOME | End: 2021-07-16
Payer: COMMERCIAL

## 2021-07-16 ENCOUNTER — HOSPITAL ENCOUNTER (OUTPATIENT)
Dept: ULTRASOUND IMAGING | Age: 51
Discharge: HOME | End: 2021-07-16
Payer: COMMERCIAL

## 2021-07-16 VITALS
RESPIRATION RATE: 20 BRPM | WEIGHT: 267 LBS | HEIGHT: 64 IN | SYSTOLIC BLOOD PRESSURE: 128 MMHG | DIASTOLIC BLOOD PRESSURE: 84 MMHG | BODY MASS INDEX: 45.58 KG/M2 | OXYGEN SATURATION: 99 % | TEMPERATURE: 98.5 F | HEART RATE: 67 BPM

## 2021-07-16 DIAGNOSIS — R11.2 NON-INTRACTABLE VOMITING WITH NAUSEA, UNSPECIFIED VOMITING TYPE: ICD-10-CM

## 2021-07-16 DIAGNOSIS — E66.01 MORBID OBESITY WITH BMI OF 45.0-49.9, ADULT (HCC): ICD-10-CM

## 2021-07-16 DIAGNOSIS — G89.29 CHRONIC ABDOMINAL PAIN: Primary | ICD-10-CM

## 2021-07-16 DIAGNOSIS — R10.9 CHRONIC ABDOMINAL PAIN: Primary | ICD-10-CM

## 2021-07-16 LAB
ALBUMIN SERPL-MCNC: 4.5 GM/DL (ref 3.4–5)
ALP BLD-CCNC: 109 IU/L (ref 40–129)
ALT SERPL-CCNC: 18 U/L (ref 10–40)
ANION GAP SERPL CALCULATED.3IONS-SCNC: 10 MMOL/L (ref 4–16)
AST SERPL-CCNC: 19 IU/L (ref 15–37)
BACTERIA: ABNORMAL /HPF
BASOPHILS ABSOLUTE: 0 K/CU MM
BASOPHILS RELATIVE PERCENT: 0.8 % (ref 0–1)
BILIRUB SERPL-MCNC: 0.3 MG/DL (ref 0–1)
BILIRUBIN URINE: NEGATIVE MG/DL
BLOOD, URINE: NEGATIVE
BUN BLDV-MCNC: 17 MG/DL (ref 6–23)
CALCIUM SERPL-MCNC: 9.8 MG/DL (ref 8.3–10.6)
CHLORIDE BLD-SCNC: 104 MMOL/L (ref 99–110)
CLARITY: CLEAR
CO2: 22 MMOL/L (ref 21–32)
COLOR: YELLOW
CREAT SERPL-MCNC: 0.8 MG/DL (ref 0.6–1.1)
DIFFERENTIAL TYPE: ABNORMAL
EKG ATRIAL RATE: 59 BPM
EKG DIAGNOSIS: NORMAL
EKG P AXIS: 37 DEGREES
EKG P-R INTERVAL: 194 MS
EKG Q-T INTERVAL: 416 MS
EKG QRS DURATION: 88 MS
EKG QTC CALCULATION (BAZETT): 411 MS
EKG R AXIS: -16 DEGREES
EKG T AXIS: 20 DEGREES
EKG VENTRICULAR RATE: 59 BPM
EOSINOPHILS ABSOLUTE: 0.2 K/CU MM
EOSINOPHILS RELATIVE PERCENT: 5 % (ref 0–3)
GFR AFRICAN AMERICAN: >60 ML/MIN/1.73M2
GFR NON-AFRICAN AMERICAN: >60 ML/MIN/1.73M2
GLUCOSE BLD-MCNC: 87 MG/DL (ref 70–99)
GLUCOSE, URINE: NEGATIVE MG/DL
HCT VFR BLD CALC: 33.6 % (ref 37–47)
HEMOGLOBIN: 10.5 GM/DL (ref 12.5–16)
IMMATURE NEUTROPHIL %: 0.3 % (ref 0–0.43)
KETONES, URINE: NEGATIVE MG/DL
LEUKOCYTE ESTERASE, URINE: NEGATIVE
LIPASE: 14 IU/L (ref 13–60)
LYMPHOCYTES ABSOLUTE: 1.3 K/CU MM
LYMPHOCYTES RELATIVE PERCENT: 33.6 % (ref 24–44)
MCH RBC QN AUTO: 26.3 PG (ref 27–31)
MCHC RBC AUTO-ENTMCNC: 31.3 % (ref 32–36)
MCV RBC AUTO: 84 FL (ref 78–100)
MONOCYTES ABSOLUTE: 0.3 K/CU MM
MONOCYTES RELATIVE PERCENT: 7.6 % (ref 0–4)
MUCUS: ABNORMAL HPF
NITRITE URINE, QUANTITATIVE: NEGATIVE
NUCLEATED RBC %: 0 %
PDW BLD-RTO: 15 % (ref 11.7–14.9)
PH, URINE: 5 (ref 5–8)
PLATELET # BLD: 152 K/CU MM (ref 140–440)
PMV BLD AUTO: 9.9 FL (ref 7.5–11.1)
POTASSIUM SERPL-SCNC: 4.3 MMOL/L (ref 3.5–5.1)
PROTEIN UA: NEGATIVE MG/DL
RBC # BLD: 4 M/CU MM (ref 4.2–5.4)
RBC URINE: <1 /HPF (ref 0–6)
SEGMENTED NEUTROPHILS ABSOLUTE COUNT: 2 K/CU MM
SEGMENTED NEUTROPHILS RELATIVE PERCENT: 52.7 % (ref 36–66)
SODIUM BLD-SCNC: 136 MMOL/L (ref 135–145)
SPECIFIC GRAVITY UA: 1.03 (ref 1–1.03)
SQUAMOUS EPITHELIAL: 5 /HPF
TOTAL IMMATURE NEUTOROPHIL: 0.01 K/CU MM
TOTAL NUCLEATED RBC: 0 K/CU MM
TOTAL PROTEIN: 6.5 GM/DL (ref 6.4–8.2)
TRICHOMONAS: ABNORMAL /HPF
TROPONIN T: <0.01 NG/ML
UROBILINOGEN, URINE: NEGATIVE MG/DL (ref 0.2–1)
WBC # BLD: 3.8 K/CU MM (ref 4–10.5)
WBC UA: 1 /HPF (ref 0–5)

## 2021-07-16 PROCEDURE — 85025 COMPLETE CBC W/AUTO DIFF WBC: CPT

## 2021-07-16 PROCEDURE — 93005 ELECTROCARDIOGRAM TRACING: CPT | Performed by: PHYSICIAN ASSISTANT

## 2021-07-16 PROCEDURE — 83690 ASSAY OF LIPASE: CPT

## 2021-07-16 PROCEDURE — 93971 EXTREMITY STUDY: CPT

## 2021-07-16 PROCEDURE — 6360000002 HC RX W HCPCS: Performed by: PHYSICIAN ASSISTANT

## 2021-07-16 PROCEDURE — 93010 ELECTROCARDIOGRAM REPORT: CPT | Performed by: INTERNAL MEDICINE

## 2021-07-16 PROCEDURE — 99285 EMERGENCY DEPT VISIT HI MDM: CPT

## 2021-07-16 PROCEDURE — 6370000000 HC RX 637 (ALT 250 FOR IP): Performed by: PHYSICIAN ASSISTANT

## 2021-07-16 PROCEDURE — 80053 COMPREHEN METABOLIC PANEL: CPT

## 2021-07-16 PROCEDURE — 84484 ASSAY OF TROPONIN QUANT: CPT

## 2021-07-16 PROCEDURE — 96372 THER/PROPH/DIAG INJ SC/IM: CPT

## 2021-07-16 PROCEDURE — 81001 URINALYSIS AUTO W/SCOPE: CPT

## 2021-07-16 PROCEDURE — 83605 ASSAY OF LACTIC ACID: CPT

## 2021-07-16 RX ORDER — PANTOPRAZOLE SODIUM 40 MG/10ML
40 INJECTION, POWDER, LYOPHILIZED, FOR SOLUTION INTRAVENOUS ONCE
Status: DISCONTINUED | OUTPATIENT
Start: 2021-07-16 | End: 2021-07-16 | Stop reason: HOSPADM

## 2021-07-16 RX ORDER — PANTOPRAZOLE SODIUM 40 MG/1
40 TABLET, DELAYED RELEASE ORAL
Qty: 30 TABLET | Refills: 0 | Status: ON HOLD | OUTPATIENT
Start: 2021-07-16 | End: 2021-08-18 | Stop reason: HOSPADM

## 2021-07-16 RX ORDER — DICYCLOMINE HYDROCHLORIDE 10 MG/ML
20 INJECTION INTRAMUSCULAR ONCE
Status: COMPLETED | OUTPATIENT
Start: 2021-07-16 | End: 2021-07-16

## 2021-07-16 RX ORDER — PROMETHAZINE HYDROCHLORIDE 25 MG/ML
25 INJECTION, SOLUTION INTRAMUSCULAR; INTRAVENOUS ONCE
Status: COMPLETED | OUTPATIENT
Start: 2021-07-16 | End: 2021-07-16

## 2021-07-16 RX ORDER — OXYCODONE HYDROCHLORIDE AND ACETAMINOPHEN 5; 325 MG/1; MG/1
1 TABLET ORAL ONCE
Status: COMPLETED | OUTPATIENT
Start: 2021-07-16 | End: 2021-07-16

## 2021-07-16 RX ORDER — ONDANSETRON 2 MG/ML
4 INJECTION INTRAMUSCULAR; INTRAVENOUS ONCE
Status: DISCONTINUED | OUTPATIENT
Start: 2021-07-16 | End: 2021-07-16

## 2021-07-16 RX ADMIN — OXYCODONE HYDROCHLORIDE AND ACETAMINOPHEN 1 TABLET: 5; 325 TABLET ORAL at 10:26

## 2021-07-16 RX ADMIN — DICYCLOMINE HYDROCHLORIDE 20 MG: 20 INJECTION, SOLUTION INTRAMUSCULAR at 07:57

## 2021-07-16 RX ADMIN — PROMETHAZINE HYDROCHLORIDE 25 MG: 25 INJECTION INTRAMUSCULAR; INTRAVENOUS at 09:24

## 2021-07-16 ASSESSMENT — PAIN DESCRIPTION - PAIN TYPE: TYPE: ACUTE PAIN

## 2021-07-16 ASSESSMENT — PAIN DESCRIPTION - LOCATION: LOCATION: ABDOMEN

## 2021-07-16 ASSESSMENT — PAIN SCALES - GENERAL
PAINLEVEL_OUTOF10: 7
PAINLEVEL_OUTOF10: 6

## 2021-07-16 NOTE — ED PROVIDER NOTES
[place]      PCP: Susanne Rodriguez MD, MD    [address] [facility]    Chief Complaint   Patient presents with    Hematemesis    Abdominal Pain       This patient was not evaluated by the attending physician. I have independently evaluated this patient. HPI    Dana Mullins is a 44 y.o. female who presents with upper abdominal pain. Patient states she is had abdominal pain for months and is scheduled to have Jet-en-Y gastric bypass and exploratory surgery in August.  Patient states she has had associated nausea and vomiting. Patient states today she does notice blood in vomit. Patient states she has history of ulcers and is currently on antacid medication. Patient has history of chronic pancreatitis. Patient denies alcohol use. Patient denies taking blood thinners. Patient denies chest pain, shortness of breath, fever. Patient has associated chills. Patient denies urinary symptoms. REVIEW OF SYSTEMS    Constitutional:  Denies fever   HENT:  Denies sore throat or ear pain   Cardiovascular:  Denies chest pain   Respiratory:  Denies cough or shortness of breath   GI:  See HPI above  : No hematuria or dysuria.     Musculoskeletal:  Denies back pain  Skin:  Denies rash  Neurologic:  Denies focal weakness or sensory changes   Lymphatic:  Denies swollen glands     All other review of systems are negative  See HPI and nursing notes for additional information     PAST MEDICAL & SURGICAL HISTORY    Past Medical History:   Diagnosis Date    Acid reflux     Anemia     Anxiety     Arthritis     Hands, Back And Ankles    Bleeding ulcer 2014    \"I Had Ulcers In My Stomach And Colon\"/ per pt on 8/12/2019\"they said recently having some blood in my stomach- in July ( 2019)could not find where coming from \"    Bronchitis Last Episode 2014    Chronic back pain     Chronic pain     [facility]    COPD (chronic obstructive pulmonary disease) ([facility])     Sees Dr. Jose Murphy History:   Procedure Laterality Date    APPENDECTOMY  02/1998    Done When Tubes And Ovaries Were Removed    CARDIAC CATHETERIZATION  10/24/2010    CATHETER REMOVAL N/A 7/16/2019    PORT REMOVAL performed by Monica Pina MD at [facility]  08/27/1991    CHOLECYSTECTOMY, LAPAROSCOPIC  1990's    COLONOSCOPY  Last Done In 2000's    DENTAL SURGERY      Teeth Extracted In Past    ENDOSCOPY, COLON, DIAGNOSTIC  Last Done In 2018    FOOT DEBRIDEMENT Left 6/16/2019    FIRST METATARSAL DEBRIDEMENT INCISION AND DRAINAGE. EXCISION OF ULCER.  RESECTION OF BONE. 1ST METATARSAL POWER LAVAGE AND BONE CEMENT performed by Say Tinajero DPM at [facility] Left Last Done In 2016     Dr. Landry Becker, \" About 6 Surgeries Done Because Of Staph Infection\"    HIATAL HERNIA REPAIR N/A 2/12/2019    HERNIA HIATAL LAPAROSCOPIC ROBOTIC performed [facility] at [facility]  10/1997    Partial Abdominal Hysterectomy    INSERTION / REMOVAL / REPLACEMENT VENOUS ACCESS CATHETER N/A 8/15/2019    PORT INSERTION performed by Monica Pina MD at [facility]    removed after 6 months    LEG BIOPSY EXCISION Left 3/8/2021    LEFT THIGH LESION BIOPSY EXCISION performed by Monica Pina MD at [facility], PARTIAL Left 2008    Benign    OTHER SURGICAL HISTORY  02/1998    \"Tubes And Ovaries Removed, Appendectomy Also Done\"    OTHER SURGICAL HISTORY  Last Done 7-15-16    Mediport Insertion \"Total Of Six Done, Removed Last Mediport In 2014\"    PORT SURGERY N/A 1/15/2020    PORT REMOVAL performed by [facility] MD at [facility] N/A 7/6/2020    PORT INSERTION performed by Monica Pina MD at [facility] N/A 2/12/2019    GASTRECTOMY SLEEVE LAPAROSCOPIC ROBOTIC performed by Monica Pina MD at [facility]  08/27/2018    UPPER GASTROINTESTINAL ENDOSCOPY N/A 4/2/2019    EGD CONTROL HEMORRHAGE with epi injection at bleeding site performed by Santos Briseno MD at [address] [place] [address] [facility] N/A 6/19/2019    EGD DIAGNOSTIC ONLY performed by Santos Briseno MD at [address] [place] [address] [facility] N/A 7/22/2019    EGD DIAGNOSTIC ONLY performed by Deborah [place] MD at [address] [place] [address] [facility] N/A 10/14/2019    EGD DIAGNOSTIC ONLY performed by Santos Briseno MD at [address] [place] [address] [facility] N/A 7/17/2020    EGJ DILATATION BALLOON WITH 18-20 MM BALLOON, DILATED TO 20 MM. performed by Santos Briseno MD at [address] [place] [address] [facility] 5/28/2021    EGD BIOPSY performed by Santos Briseno MD at [address] [place] [address] Outpatient Rx   Medication Sig Dispense Refill    pantoprazole (PROTONIX) 40 MG tablet Take 1 tablet by mouth daily (with breakfast) 30 tablet 0    promethazine (PHENERGAN) 25 MG tablet TAKE 1 TABLET BY MOUTH EVERY 8 HOURS AS NEEDED FOR NAUSEA 30 tablet 0    SM SENNA-S 8.6-50 MG per tablet       metroNIDAZOLE (FLAGYL) 500 MG tablet TAKE 1 TABLET BY MOUTH EVERY 8 HOURS FOR 7 DAYS      scopolamine (TRANSDERM-SCOP) transdermal patch APPLY 1 PATCH TRANSDERMALLY TO THE SKIN BEHIND THE EAR ONCE EVERY 3 DAYS AS NEEDED 10 patch 0    famotidine (PEPCID) 20 MG tablet Take 1 tablet by mouth 2 times daily 14 tablet 0    dicyclomine (BENTYL) 10 MG capsule Take 1 capsule by mouth 3 times daily As needed for abdominal pain 15 capsule 3    ondansetron (ZOFRAN ODT) 4 MG disintegrating tablet Take 1 tablet by mouth every 8 hours as needed for Nausea 15 tablet 0    calcium carbonate (ANTACID) 500 MG chewable tablet Take 1 tablet by mouth daily 30 tablet 0    famotidine (PEPCID) 20 MG tablet Take 1 tablet by mouth 2 times daily 14 tablet 0    dicyclomine (BENTYL) 10 MG capsule Take 1 capsule by mouth 3 times daily As needed for abdominal pain 15 capsule 3    ondansetron (ZOFRAN ODT) 4 MG disintegrating tablet Take 1 tablet by mouth every 8 hours as needed for Nausea 15 tablet 0    calcium carbonate (ANTACID) 500 MG chewable tablet Take 1 tablet by mouth daily 30 tablet 0    ondansetron (ZOFRAN ODT) 4 MG disintegrating tablet Take 1 tablet by mouth every 8 hours as needed for Nausea 15 tablet 0    famotidine (PEPCID) 20 MG tablet Take 1 tablet by mouth 2 times daily 14 tablet 0    dicyclomine (BENTYL) 10 MG capsule Take 1 capsule by mouth 3 times daily As needed for abdominal pain 15 capsule 3    calcium carbonate (ANTACID) 500 MG chewable tablet Take 1 tablet by mouth daily 30 tablet 0    cholestyramine light 4 g packet Take 1 packet by mouth 3 times daily 60 packet 3    Melatonin 10 MG TABS Take 10-20 mg by mouth nightly as needed      Cyanocobalamin (VITAMIN B 12 PO) Take 1 tablet by mouth daily      meclizine (ANTIVERT) 25 MG tablet Take 25 mg by mouth 3 times daily as needed for Dizziness      dicyclomine (BENTYL) 10 MG capsule Take 1 capsule by mouth 3 times daily as needed (abdominal pain) 21 capsule 3    hydrOXYzine (VISTARIL) 50 MG capsule Take 1 capsule by mouth every 6 hours as needed for Anxiety 30 capsule 3    levETIRAcetam (KEPPRA) 1000 MG tablet Take 1 tablet by mouth 2 times daily 60 tablet 3    levothyroxine (SYNTHROID) 125 MCG tablet Take 1 tablet by mouth Daily 30 tablet 3    atenolol (TENORMIN) 25 MG tablet Take 1 tablet by mouth daily 30 tablet 3    cloNIDine (CATAPRES) 0.1 MG tablet Take 1 tablet by mouth 2 times daily (Patient taking differently: Take 0.1 mg by mouth 2 times daily as needed for High Blood Pressure 05/27/21 Patient states she only takes as needed usually if SBP>150) 60 tablet 3    estradiol (ESTRACE) 0.5 MG tablet Take 1 tablet by mouth daily (Patient taking differently: Take 0.5 mg by mouth nightly ) 21 tablet 3    PARoxetine (PAXIL) 30 MG tablet Take 1.5 tablets by mouth nightly (Patient taking differently: Take 30 mg by mouth nightly ) 30 tablet 3    albuterol (PROVENTIL) (2.5 MG/3ML) 0.083% nebulizer solution Take 3 mLs by nebulization every 6 hours as needed for Wheezing 120 each 0    betamethasone valerate (VALISONE) 0.1 % ointment APPLY OINTMENT TO AFFECTED AREA TWICE DAILY TO HANDS AND ELBOWS FOR 21 DAYS      EQ PINK-BISMUTH 262 MG chewable tablet CHEW & SWALLOW 2 TABLETS BY MOUTH 4 TIMES DAILY BEFORE MEAL(S) AND NIGHTLY FOR 5 DAYS      ondansetron (ZOFRAN-ODT) 4 MG disintegrating tablet DISSOLVE 1 TABLET IN MOUTH EVERY 8 HOURS AS NEEDED FOR NAUSEA OR VOMITING      potassium gluconate 550 mg tablet Take 1 tablet by mouth daily      tiZANidine (ZANAFLEX) 4 MG tablet Take 4 mg by mouth every 8 hours as needed       albuterol sulfate  (90 Base) MCG/ACT inhaler Inhale 2 puffs into the lungs 4 times daily 1 Inhaler 5    ferrous sulfate (IRON 325) 325 (65 Fe) MG tablet Take 1 tablet by mouth daily (with breakfast) 90 tablet 5    oxyCODONE-acetaminophen (PERCOCET) 5-325 MG per tablet Take 1 tablet by mouth.  Every 4-6 hours PRN      Cholecalciferol (VITAMIN D3) 5000 units TABS Take 1 tablet by mouth daily      Multiple Vitamin (MVI, BARIATRIC ADVANTAGE MULTI-FORMULA, CHEW TAB) Take 1 tablet by mouth daily 30 tablet 5    Calcium Citrate-Vitamin D (CALCIUM + VIT D, BARIATRIC ADVANTAGE, CHEWABLE TABLET) Take 1 tablet by mouth 2 times daily 120 tablet 5    gabapentin (NEURONTIN) 800 MG tablet Take 800 mg by mouth 3 times daily         ALLERGIES    Allergies   Allergen Reactions    Aspirin Palpitations     \"My Heart Rate Elevates\"    Shellfish-Derived Products Swelling    Toradol [Ketorolac Tromethamine] Rash    Compazine [Prochlorperazine]     Haldol [Haloperidol]      Shaking      Reglan [Metoclopramide] Itching       SOCIAL AND FAMILY HISTORY    Social History     Socioeconomic History    Marital status:      Spouse name: Not on file    Number of children: Not on file    Years of education: Not on file    Highest education level: Not on file   Occupational History    Not on file   Tobacco Use    Smoking status: Never Smoker    Smokeless tobacco: Never Used   Vaping Use    Vaping Use: Never used   Substance and Sexual Activity    Alcohol use: No     Alcohol/week: 0.0 standard drinks    Drug use: No    Sexual activity: Not Currently   Other Topics Concern    Not on file   Social History Narrative    Not on file     Social Determinants of Health     Financial Resource Strain:     Difficulty of Paying Living Expenses:    Food Insecurity:     Worried About Running Out of Food in the Last Year:     Ran Out of Food in the Last Year:    Transportation Needs:     Lack of Transportation (Medical):      Lack of Transportation (Non-Medical):    Physical Activity:     Days of Exercise per Week:     Minutes of Exercise per Session:    Stress:     Feeling of Stress :    Social Connections:     Frequency of Communication with Friends and Family:     Frequency of Social Gatherings with Friends and Family:     Attends Mu-ism Services:     Active Member of Clubs or Organizations:     Attends Club or Organization Meetings:     Marital Status:    Intimate Partner Violence:     Fear of Current or Ex-Partner:     Emotionally Abused:     Physically Abused:     Sexually Abused:      Family History   Problem Relation Age of Onset    Diabetes Mother         \"Borderline Diabetes\"    High Blood Pressure Mother     Obesity Mother     Arthritis Mother     Heart Disease Mother     High Cholesterol Mother     Vision Loss Mother     Diabetes Father     High Blood Pressure Father     Asthma Father     Cancer Father         prostate cancer    High Blood Pressure Brother     Asthma Son     Vision Loss Son     Lupus Daughter     Other Daughter         \"Alot Of Female Problems\"       PHYSICAL EXAM    VITAL SIGNS: BP 130/65   Pulse 61   Temp 98.5 °F (36.9 °C) (Oral)   Resp 19   Ht 5' 4\" (1.626 m)   Wt 267 lb (121.1 kg)   SpO2 97%   BMI 45.83 kg/m²   Constitutional:  Well developed, well nourished  Eyes:  Sclera nonicteric, conjunctiva moist  HENT:  Atraumatic. PERRL. EOMI.  moist mucus membranes. Neck/Lymphatics: supple, no JVD, no swollen nodes  Respiratory:  No retractions, no accessory muscle use, normal breath sounds   Cardiovascular: normal rate, normal rhythm, no murmurs  GI:     No gross discoloration.       -no [place]'s sign (periumbilical ecchymosis)       -no Grey-Armstrong's sign (flank ecchymosis)    Bowel sounds present, no audible bruits. Soft,  no guarding,   + Moderate upper abdominal tenderness, no rebound  Back:  No CVA tenderness to percussion.   Musculoskeletal:  No edema, no deformity, no tenderness to palpation to lower extremities  Vascular: DP pulses 2+ equal bilaterally  Integument: No rash, dry skin  Neurologic:  Alert & oriented, normal speech  Psychiatric: Cooperative, pleasant affect       LABS:  Results for orders placed or performed during the hospital encounter of 07/16/21   CBC Auto Differential   Result Value Ref Range    WBC 3.8 (L) 4.0 - 10.5 K/CU MM    RBC 4.00 (L) 4.2 - 5.4 M/CU MM    Hemoglobin 10.5 (L) 12.5 - 16.0 GM/DL    Hematocrit 33.6 (L) 37 - 47 %    MCV 84.0 78 - 100 FL    MCH 26.3 (L) 27 - 31 PG    MCHC 31.3 (L) 32.0 - 36.0 %    RDW 15.0 (H) 11.7 - 14.9 %    Platelets 947 675 - 333 K/CU MM    MPV 9.9 7.5 - 11.1 FL    Differential Type AUTOMATED DIFFERENTIAL     Segs Relative 52.7 36 - 66 %    Lymphocytes % 33.6 24 - 44 %    Monocytes % 7.6 (H) 0 - 4 %    Eosinophils % 5.0 (H) 0 - 3 %    Basophils % 0.8 0 - 1 %    Segs Absolute 2.0 K/CU MM    Lymphocytes Absolute 1.3 K/CU MM    Monocytes Absolute 0.3 K/CU MM    Eosinophils Absolute 0.2 K/CU MM    Basophils Absolute 0.0 K/CU MM    Nucleated RBC % 0.0 %    Total Nucleated RBC 0.0 K/CU MM    Total Immature Neutrophil 0.01 K/CU MM Immature Neutrophil % 0.3 0 - 0.43 %   Comprehensive Metabolic Panel w/ Reflex to MG   Result Value Ref Range    Sodium 136 135 - 145 MMOL/L    Potassium 4.3 3.5 - 5.1 MMOL/L    Chloride 104 99 - 110 mMol/L    CO2 22 21 - 32 MMOL/L    BUN 17 6 - 23 MG/DL    CREATININE 0.8 0.6 - 1.1 MG/DL    Glucose 87 70 - 99 MG/DL    Calcium 9.8 8.3 - 10.6 MG/DL    Albumin 4.5 3.4 - 5.0 GM/DL    Total Protein 6.5 6.4 - 8.2 GM/DL    Total Bilirubin 0.3 0.0 - 1.0 MG/DL    ALT 18 10 - 40 U/L    AST 19 15 - 37 IU/L    Alkaline Phosphatase 109 40 - 129 IU/L    GFR Non-African American >60 >60 mL/min/1.73m2    GFR African American >60 >60 mL/min/1.73m2    Anion Gap 10 4 - 16   Lipase   Result Value Ref Range    Lipase 14 13 - 60 IU/L   Urinalysis   Result Value Ref Range    Color, UA YELLOW YELLOW    Clarity, UA CLEAR CLEAR    Glucose, Urine NEGATIVE NEGATIVE MG/DL    Bilirubin Urine NEGATIVE NEGATIVE MG/DL    Ketones, Urine NEGATIVE NEGATIVE MG/DL    Specific Gravity, UA 1.028 1.001 - 1.035    Blood, Urine NEGATIVE NEGATIVE    pH, Urine 5.0 5.0 - 8.0    Protein, UA NEGATIVE NEGATIVE MG/DL    Urobilinogen, Urine NEGATIVE 0.2 - 1.0 MG/DL    Nitrite Urine, Quantitative NEGATIVE NEGATIVE    Leukocyte Esterase, Urine NEGATIVE NEGATIVE    RBC, UA <1 0 - 6 /HPF    WBC, UA 1 0 - 5 /HPF    Bacteria, UA RARE (A) NEGATIVE /HPF    Squam Epithel, UA 5 /HPF    Mucus, UA OCCASIONAL (A) NEGATIVE HPF    Trichomonas, UA NONE SEEN NONE SEEN /HPF   Troponin   Result Value Ref Range    Troponin T <0.010 <0.01 NG/ML   EKG 12 Lead   Result Value Ref Range    Ventricular Rate 59 BPM    Atrial Rate 59 BPM    P-R Interval 194 ms    QRS Duration 88 ms    Q-T Interval 416 ms    QTc Calculation (Bazett) 411 ms    P Axis 37 degrees    R Axis -16 degrees    T Axis 20 degrees    Diagnosis       Sinus bradycardia  Possible Left atrial enlargement  Borderline ECG  When compared with ECG of 02-JUL-2021 17:36,  No significant change was found           EKG Interpretation  Please see ED physician's note for EKG interpretation        ED [facility]      Patient presents as above. See physician note for EKG reading. CBC shows white blood cell count 3.8, hemoglobin 10.5 hematocrit 30.6 which is similar to previous. CMP within normal limits. Lipase 14. Urinalysis unremarkable. Troponin negative. Patient provided IM Phenergan, IM Bentyl. Unable to obtain IV access. Patient states she is feeling better on reevaluation, tolerates p.o. fluids with no further vomiting. I consulted patient's gastroenterologist Dr. Edmund Sanchez who agrees with continuing her previously prescribed antacid medications, Bentyl, Zofran and Phenergan with outpatient follow-up. I discussed this with patient is agreeable, patient provided new prescription for Protonix as she is about out. Patient states she has prescriptions for Zofran, Phenergan and Bentyl. Recommend follow-up with gastroenterology, primary care or general surgery in 2 days for recheck. I recommend gradually increasing diet as tolerated. Clinical  IMPRESSION    1. Chronic abdominal pain    2. Non-intractable vomiting with nausea, unspecified vomiting type          I discussed with patient importance return the emergency department immediately if new or worsening symptoms develop. Comment: Please note this report has been produced using speech recognition software and may contain errors related to that system including errors in grammar, punctuation, and spelling, as well as words and phrases that may be inappropriate. If there are any questions or concerns please feel free to contact the dictating provider for clarification.       Servando Briones PA-C  07/16/21 0579

## 2021-07-16 NOTE — ED PROVIDER NOTES
EKG  Sinus bradycardia with rate of 59 bpm.  No ST elevation. Normal intervals. No previous to compare.      Antonia Mendez MD  07/16/21 0700

## 2021-07-16 NOTE — ED NOTES
This nurse attempted to flush accessed mediport; could not get line flushed; pt states that they have been having some problems with it not working; this nurse removed mediport needle and placed new power port needle; needle does flush but only very little saline and it is hard to push; pt states that she can taste the saline slightly; order in the computer for the PICC team to come look at site when they are able to do so       [address] [place], [facility]  07/16/21 4725

## 2021-07-16 NOTE — ED NOTES
Report received from [facility], [place]. IV team consulted for port issues.       Eugenia Kendall RN  07/16/21 [id number]

## 2021-07-16 NOTE — ED NOTES
Spoke with Jimmie, from IV team. Per Jimmie, he currently has 8 PICC placements on the board. Instructions received for cath flow.  Will advise Alejo Gunn and see what his recommendations are.      Ilsa Aase, RN  07/16/21 4532

## 2021-07-16 NOTE — ED NOTES
Needle removed from [facility] Count includes the [facility]; no complications noted       Yanira [place], [place]  07/16/21 8805

## 2021-07-20 DIAGNOSIS — Z01.818 PRE-OP TESTING: Primary | ICD-10-CM

## 2021-07-27 ENCOUNTER — CLINICAL DOCUMENTATION (OUTPATIENT)
Dept: ONCOLOGY | Age: 51
End: 2021-07-27

## 2021-07-27 ENCOUNTER — HOSPITAL ENCOUNTER (OUTPATIENT)
Dept: INFUSION THERAPY | Age: 51
Setting detail: INFUSION SERIES
Discharge: HOME | End: 2021-07-27
Payer: COMMERCIAL

## 2021-07-27 VITALS
TEMPERATURE: 97.5 F | DIASTOLIC BLOOD PRESSURE: 43 MMHG | HEART RATE: 53 BPM | OXYGEN SATURATION: 94 % | RESPIRATION RATE: 20 BRPM | SYSTOLIC BLOOD PRESSURE: 106 MMHG

## 2021-07-27 DIAGNOSIS — D50.0 IRON DEFICIENCY ANEMIA DUE TO CHRONIC BLOOD LOSS: ICD-10-CM

## 2021-07-27 DIAGNOSIS — Z78.9 POOR INTRAVENOUS ACCESS: Primary | ICD-10-CM

## 2021-07-27 DIAGNOSIS — K90.9 INTESTINAL MALABSORPTION, UNSPECIFIED TYPE: Primary | ICD-10-CM

## 2021-07-27 PROCEDURE — 96374 THER/PROPH/DIAG INJ IV PUSH: CPT

## 2021-07-27 PROCEDURE — 96365 THER/PROPH/DIAG IV INF INIT: CPT

## 2021-07-27 PROCEDURE — 6370000000 HC RX 637 (ALT 250 FOR IP): Performed by: NURSE PRACTITIONER

## 2021-07-27 PROCEDURE — 96366 THER/PROPH/DIAG IV INF ADDON: CPT

## 2021-07-27 PROCEDURE — 6360000002 HC RX W HCPCS: Performed by: NURSE PRACTITIONER

## 2021-07-27 PROCEDURE — 99211 OFF/OP EST MAY X REQ PHY/QHP: CPT

## 2021-07-27 PROCEDURE — 2580000003 HC RX 258: Performed by: NURSE PRACTITIONER

## 2021-07-27 PROCEDURE — 76937 US GUIDE VASCULAR ACCESS: CPT

## 2021-07-27 RX ORDER — DEXAMETHASONE SODIUM PHOSPHATE 10 MG/ML
10 INJECTION INTRAMUSCULAR; INTRAVENOUS ONCE
Status: CANCELLED
Start: 2021-08-08 | End: 2021-08-08

## 2021-07-27 RX ORDER — ACETAMINOPHEN 325 MG/1
650 TABLET ORAL ONCE
Status: CANCELLED
Start: 2021-08-08 | End: 2021-08-08

## 2021-07-27 RX ORDER — SODIUM CHLORIDE 0.9 % (FLUSH) 0.9 %
5-40 SYRINGE (ML) INJECTION PRN
Status: CANCELLED | OUTPATIENT
Start: 2021-08-08

## 2021-07-27 RX ORDER — SODIUM CHLORIDE 0.9 % (FLUSH) 0.9 %
5-40 SYRINGE (ML) INJECTION PRN
Status: DISCONTINUED | OUTPATIENT
Start: 2021-07-27 | End: 2021-07-28 | Stop reason: HOSPADM

## 2021-07-27 RX ORDER — EPINEPHRINE 1 MG/ML
0.3 INJECTION, SOLUTION, CONCENTRATE INTRAVENOUS PRN
Status: CANCELLED | OUTPATIENT
Start: 2021-08-08

## 2021-07-27 RX ORDER — DIPHENHYDRAMINE HYDROCHLORIDE 50 MG/ML
50 INJECTION INTRAMUSCULAR; INTRAVENOUS ONCE
Status: CANCELLED | OUTPATIENT
Start: 2021-08-08 | End: 2021-08-08

## 2021-07-27 RX ORDER — DEXAMETHASONE SODIUM PHOSPHATE 10 MG/ML
10 INJECTION INTRAMUSCULAR; INTRAVENOUS ONCE
Status: COMPLETED | OUTPATIENT
Start: 2021-07-27 | End: 2021-07-27

## 2021-07-27 RX ORDER — SODIUM CHLORIDE 9 MG/ML
INJECTION, SOLUTION INTRAVENOUS CONTINUOUS
Status: CANCELLED | OUTPATIENT
Start: 2021-08-08

## 2021-07-27 RX ORDER — MEPERIDINE HYDROCHLORIDE 25 MG/ML
12.5 INJECTION INTRAMUSCULAR; INTRAVENOUS; SUBCUTANEOUS ONCE
Status: CANCELLED | OUTPATIENT
Start: 2021-08-08 | End: 2021-08-08

## 2021-07-27 RX ORDER — HEPARIN SODIUM (PORCINE) LOCK FLUSH IV SOLN 100 UNIT/ML 100 UNIT/ML
500 SOLUTION INTRAVENOUS PRN
Status: CANCELLED | OUTPATIENT
Start: 2021-08-08

## 2021-07-27 RX ORDER — ACETAMINOPHEN 325 MG/1
650 TABLET ORAL ONCE
Status: COMPLETED | OUTPATIENT
Start: 2021-07-27 | End: 2021-07-27

## 2021-07-27 RX ORDER — METHYLPREDNISOLONE SODIUM SUCCINATE 125 MG/2ML
125 INJECTION, POWDER, LYOPHILIZED, FOR SOLUTION INTRAMUSCULAR; INTRAVENOUS ONCE
Status: CANCELLED | OUTPATIENT
Start: 2021-08-08 | End: 2021-08-08

## 2021-07-27 RX ORDER — SODIUM CHLORIDE 9 MG/ML
25 INJECTION, SOLUTION INTRAVENOUS PRN
Status: CANCELLED | OUTPATIENT
Start: 2021-08-08

## 2021-07-27 RX ADMIN — ACETAMINOPHEN 650 MG: 325 TABLET ORAL at 10:43

## 2021-07-27 RX ADMIN — SODIUM CHLORIDE, PRESERVATIVE FREE 10 ML: 5 INJECTION INTRAVENOUS at 10:47

## 2021-07-27 RX ADMIN — DEXAMETHASONE SODIUM PHOSPHATE 10 MG: 10 INJECTION INTRAMUSCULAR; INTRAVENOUS at 10:44

## 2021-07-27 RX ADMIN — SODIUM CHLORIDE 1000 MG: 9 INJECTION, SOLUTION INTRAVENOUS at 10:48

## 2021-07-27 ASSESSMENT — PAIN DESCRIPTION - FREQUENCY: FREQUENCY: INTERMITTENT

## 2021-07-27 ASSESSMENT — PAIN SCALES - GENERAL
PAINLEVEL_OUTOF10: 5
PAINLEVEL_OUTOF10: 5

## 2021-07-27 ASSESSMENT — PAIN DESCRIPTION - LOCATION: LOCATION: ABDOMEN

## 2021-07-27 ASSESSMENT — PAIN DESCRIPTION - ORIENTATION: ORIENTATION: RIGHT

## 2021-07-27 NOTE — PROGRESS NOTES
Call received from Layne Blackman at [address] [place] [address] infusion center. Pt's port is not flushing or giving blood and INFED was given peripherally but then the IV blew and wouldn't flush. Numerous attempts have been made to access IV but unable to obtain. Pt has received 500ml of INFED. Will d/c the rest of the reorder and have pt return next week. She is having her port revised on Friday and will be able to receive the infusion through the port. MD notified.

## 2021-07-27 NOTE — PROGRESS NOTES
Pt discharged home. Tolerated what she did receive of Iron Dextran fine. Reviewed discharge instruction, voiced understanding. Copies of AVS given. Pt discharged home. Pt to exit via ambulation.     Orders Placed This Encounter   Medications    sodium chloride flush 0.9 % injection 5-40 mL    dexamethasone (DECADRON) injection 10 mg    acetaminophen (TYLENOL) tablet 650 mg    iron dextran complex (INFED) 1,000 mg in sodium chloride 0.9 % 500 mL IVPB

## 2021-07-27 NOTE — PROGRESS NOTES
IV Iron started infusing and within minutes pt c/o pain at site and site appeared infiltrated without being able to infuse saline or med. Pt advised that this nurse would be contacting [facility] for further orders.

## 2021-07-27 NOTE — PROGRESS NOTES
Contacted Cancer center re: pt's status. Spoke with April RN Nurse Navigator in [facility]. Advised of pt's problems with her IV line and port not working. Patient set up for port access and declot if needed tomorrow afternoon with IR. She received approx 2.5 hours of 4 hour iron infusion with approx 150ml LTC in bag that was wasted. April to discuss with Laura Iyer and get back to us if another iron infusion is needed before pts scheduled surgery.

## 2021-07-27 NOTE — PROGRESS NOTES
IV PICC team contacted to place another midline. IV infiltraed and pt c/o pain at site. Difficulty flushing.

## 2021-07-28 DIAGNOSIS — F41.9 ANXIETY: ICD-10-CM

## 2021-07-28 RX ORDER — HYDROXYZINE PAMOATE 50 MG/1
50 CAPSULE ORAL EVERY 6 HOURS PRN
Qty: 60 CAPSULE | Refills: 0 | Status: SHIPPED | OUTPATIENT
Start: 2021-07-28 | End: 2021-09-01 | Stop reason: SDUPTHER

## 2021-07-29 ENCOUNTER — HOSPITAL ENCOUNTER (OUTPATIENT)
Dept: INTERVENTIONAL RADIOLOGY/VASCULAR | Age: 51
Discharge: HOME | End: 2021-07-29
Payer: COMMERCIAL

## 2021-07-29 VITALS
TEMPERATURE: 97.4 F | OXYGEN SATURATION: 97 % | WEIGHT: 270 LBS | HEART RATE: 69 BPM | DIASTOLIC BLOOD PRESSURE: 76 MMHG | BODY MASS INDEX: 46.1 KG/M2 | HEIGHT: 64 IN | SYSTOLIC BLOOD PRESSURE: 137 MMHG | RESPIRATION RATE: 16 BRPM

## 2021-07-29 DIAGNOSIS — Z78.9 POOR INTRAVENOUS ACCESS: ICD-10-CM

## 2021-07-29 PROCEDURE — 7100000011 HC PHASE II RECOVERY - ADDTL 15 MIN

## 2021-07-29 PROCEDURE — 36598 INJ W/FLUOR EVAL CV DEVICE: CPT

## 2021-07-29 PROCEDURE — 7100000010 HC PHASE II RECOVERY - FIRST 15 MIN

## 2021-07-29 PROCEDURE — 2709999900 HC NON-CHARGEABLE SUPPLY

## 2021-07-29 PROCEDURE — 6360000004 HC RX CONTRAST MEDICATION: Performed by: SURGERY

## 2021-07-29 RX ADMIN — IOPAMIDOL 3 ML: 755 INJECTION, SOLUTION INTRAVENOUS at 08:53

## 2021-07-29 ASSESSMENT — PAIN - FUNCTIONAL ASSESSMENT: PAIN_FUNCTIONAL_ASSESSMENT: 0-10

## 2021-07-29 ASSESSMENT — PAIN SCALES - GENERAL: PAINLEVEL_OUTOF10: 0

## 2021-07-29 NOTE — H&P
Date:2021  Name:Andre Pope   :1970   VK#:[id number]    SEX:female   Referring Physician:  Maury Corley  Primary Physician:  Lele  Chief Complaint:  Port not flushing  History of Present Illness:   Port not flushing    HISTORY AND PHYSICAL  Poor intravenous access [Z78.9]    Past Medical History:  Past Medical History:   Diagnosis Date    Acid reflux     Anemia     Anxiety     Arthritis     Hands, Back And Ankles    Bleeding ulcer     \"I Had Ulcers In My Stomach And Colon\"/ per pt on 2019\"they said recently having some blood in my stomach- in July ( )could not find where coming from \"    Bronchitis Last Episode     Chronic back pain     [facility]     [facility]    COPD (chronic obstructive pulmonary disease) ([facility])     Sees Dr. Zeyad Macias Depression     Fibromyalgia Dx     GERD (gastroesophageal reflux disease)     H/O echocardiogram 2015    EF >55%. LA to be at the upper limit of normal in size. LV hypertrophy with normal LV systolic, but abnormal diastolic function. Normal valvular structures and function.  H/O echocardiogram 2018    EF 55-60%    Hiatal hernia     History of blood transfusion 2015 And     No Reaction To Blood Transfusions Received    History of sleeve gastrectomy     [place] (hard of hearing)     Right Ear    Hx of cardiac catheterization 10/24/2010    Angiographically normal coronary arteries w/ normal LV function and wall motion.  Hx of transesophageal echocardiography (PILO) for monitoring 2010    EF 55-60%. WNL.    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 name    Kidney cysts     Belia Leaf found that I have a kidney cyst but not sure which side\" per pt on 7/15/2020    Lupus [facility]) Dx 2013    \"Rheumatoid Lupus\"    MDRO (multiple drug resistant organisms) resistance     4 months ago chest from mediport    Morbid obesity ([address]     MRSA bacteremia     Nausea     \"Most Of The Time\"    Osteomyelitis of fifth toe of left foot (HCC)     Pain management     Sees Dr. Carrie Guerin, Once A Month    Pancreatitis chronic Dx 2001    Pneumonia Dx 11-15    Seizures ([address]     Shortness of breath on exertion     Shortness of breath on exertion     Sleep apnea     Has CPAP\"no longer use the cpap since lost weight\"    Staph infection Dx 11-15    Left Foot    Staph infection Dx 11-15    \"Left Foot\"    Teeth missing     Upper And Lower    Thyroid disease     Wears glasses     To Read       Past Surgical History:  Past Surgical History:   Procedure Laterality Date    APPENDECTOMY  02/1998    Done When Tubes And Ovaries Were Removed    CARDIAC CATHETERIZATION  10/24/2010    CATHETER REMOVAL N/A 7/16/2019    PORT REMOVAL performed by James Modi MD at [address]  08/27/1991    CHOLECYSTECTOMY, LAPAROSCOPIC  1990's    COLONOSCOPY  Last Done In 2000's    DENTAL SURGERY      Teeth Extracted In Past    ENDOSCOPY, COLON, DIAGNOSTIC  Last Done In 2018    FOOT DEBRIDEMENT Left 6/16/2019    FIRST METATARSAL DEBRIDEMENT INCISION AND DRAINAGE. EXCISION OF ULCER.  RESECTION OF BONE. 1ST METATARSAL POWER LAVAGE AND BONE CEMENT performed by Amber Lozada DPM at [address] Left Last Done In 2016     Dr. Marcial Epps, \" About 6 Surgeries Done Because Of Staph Infection\"    HIATAL HERNIA REPAIR N/A 2/12/2019    HERNIA HIATAL LAPAROSCOPIC ROBOTIC performed by James Modi MD at [address] [place] [address]  10/1997    Partial Abdominal Hysterectomy    INSERTION / REMOVAL / REPLACEMENT VENOUS ACCESS CATHETER N/A 8/15/2019    [facility]  LAP BAND  ~2000    removed after 6 months    LEG BIOPSY EXCISION Left 3/8/2021    LEFT THIGH LESION BIOPSY EXCISION performed by Matthew Reyes MD at [facility], PARTIAL Left 2008    Benign    OTHER SURGICAL HISTORY  02/1998    \"Tubes And Ovaries Removed, Appendectomy Also Done\"    OTHER SURGICAL HISTORY  Last Done 7-15-16    Mediport Insertion \"Total Of Six Done, Removed Last Mediport In 2014\"    PORT SURGERY N/A 1/15/2020    PORT REMOVAL performed by Matthew Reyes MD at [facility] N/A 7/6/2020    PORT INSERTION performed by Matthew Reyes MD at [address] [place] [address] N/A 2/12/2019    GASTRECTOMY SLEEVE LAPAROSCOPIC ROBOTIC performed by Matthew Reyes MD at [address] [place] [address]  08/27/2018    UPPER GASTROINTESTINAL ENDOSCOPY N/A 4/2/2019    EGD CONTROL HEMORRHAGE with epi injection at bleeding site performed by Matthew Reyes MD at [place] [address] 6/19/2019    EGD DIAGNOSTIC ONLY performed by Matthew Reyes MD at [facility] N/A 7/22/2019    EGD DIAGNOSTIC ONLY performed by Naomy Hernandez MD at [facility] N/A 10/14/2019    EGD DIAGNOSTIC ONLY performed by Matthew Reyes MD at [facility] N/A 7/17/2020    EGJ DILATATION BALLOON WITH 18-20 MM BALLOON, DILATED TO 20 MM. performed by Matthew Reyes MD at [facility] N/A 5/28/2021    EGD BIOPSY performed by Matthew Reyes MD at [address] [place] [address] History:  Social History     Socioeconomic History    Marital status:      Spouse name: Not on file    Number of children: Not on file    Years of education: Not on file    Highest education level: Not on file   Occupational History    Not on file   Tobacco Use    Smoking status: Never Smoker    Smokeless tobacco: Never Used   Vaping Use    Vaping Use: Never used   Substance and Sexual Activity    Alcohol use: No     Alcohol/week: 0.0 standard drinks    Drug use: No    Sexual activity: Not Currently   Other Topics Concern    Not on file   Social History Narrative    Not on file     Social Determinants of Health     Financial Resource Strain:     Difficulty of Paying Living Expenses:    Food Insecurity:     Worried About Running Out of Food in the Last Year:     Ran Out of Food in the Last Year:    Transportation Needs:     Lack of Transportation (Medical):  Lack of Transportation (Non-Medical):    Physical Activity:     Days of Exercise per Week:     Minutes of Exercise per Session:    Stress:     Feeling of Stress :    Social Connections:     Frequency of Communication with Friends and Family:     Frequency of Social Gatherings with Friends and Family:     Attends [employer] Services:     Active Member of Clubs or Organizations:     Attends Club or Organization Meetings:     Marital Status:    Intimate Partner Violence:     Fear of Current or Ex-Partner:     Emotionally Abused:     Physically Abused:     Sexually Abused:        Family History:  Family History   Problem Relation Age of Onset    Diabetes Mother         \"Borderline Diabetes\"    High Blood Pressure Mother     Obesity Mother     Arthritis Mother     Heart Disease Mother     High Cholesterol Mother     Vision Loss Mother     Diabetes Father     High Blood Pressure Father     Asthma Father     Cancer Father         prostate cancer    High Blood Pressure Brother     Asthma Son     Vision Loss Son     Lupus Daughter     Other Daughter         \"Alot Of Female Problems\"       Allergies:   Allergies   Allergen Reactions    Aspirin Palpitations     \"My Heart Rate Elevates\"    Shellfish-Derived Products Swelling    Toradol [Ketorolac Tromethamine] Rash    Compazine [Prochlorperazine]  Haldol [Haloperidol]      Shaking      Reglan [Metoclopramide] Itching       Medications:  Current Outpatient Medications on File Prior to Encounter   Medication Sig Dispense Refill    hydrOXYzine (VISTARIL) 50 MG capsule TAKE 1 CAPSULE BY MOUTH EVERY 6 HOURS AS NEEDED FOR ANXIETY 60 capsule 0    pantoprazole (PROTONIX) 40 MG tablet Take 1 tablet by mouth daily (with breakfast) 30 tablet 0    promethazine (PHENERGAN) 25 MG tablet TAKE 1 TABLET BY MOUTH EVERY 8 HOURS AS NEEDED FOR NAUSEA 30 tablet 0    SM SENNA-S 8.6-50 MG per tablet       scopolamine (TRANSDERM-SCOP) transdermal patch APPLY 1 PATCH TRANSDERMALLY TO THE SKIN BEHIND THE EAR ONCE EVERY 3 DAYS AS NEEDED 10 patch 0    ondansetron (ZOFRAN ODT) 4 MG disintegrating tablet Take 1 tablet by mouth every 8 hours as needed for Nausea 15 tablet 0    famotidine (PEPCID) 20 MG tablet Take 1 tablet by mouth 2 times daily 14 tablet 0    Melatonin 10 MG TABS Take 10-20 mg by mouth nightly as needed      Cyanocobalamin (VITAMIN B 12 PO) Take 1 tablet by mouth daily      meclizine (ANTIVERT) 25 MG tablet Take 25 mg by mouth 3 times daily as needed for Dizziness      dicyclomine (BENTYL) 10 MG capsule Take 1 capsule by mouth 3 times daily as needed (abdominal pain) 21 capsule 3    levETIRAcetam (KEPPRA) 1000 MG tablet Take 1 tablet by mouth 2 times daily 60 tablet 3    levothyroxine (SYNTHROID) 125 MCG tablet Take 1 tablet by mouth Daily 30 tablet 3    atenolol (TENORMIN) 25 MG tablet Take 1 tablet by mouth daily 30 tablet 3    estradiol (ESTRACE) 0.5 MG tablet Take 1 tablet by mouth daily (Patient taking differently: Take 0.5 mg by mouth nightly ) 21 tablet 3    PARoxetine (PAXIL) 30 MG tablet Take 1.5 tablets by mouth nightly (Patient taking differently: Take 30 mg by mouth nightly ) 30 tablet 3    albuterol (PROVENTIL) (2.5 MG/3ML) 0.083% nebulizer solution Take 3 mLs by nebulization every 6 hours as needed for Wheezing 120 each 0    betamethasone valerate (VALISONE) 0.1 % ointment APPLY OINTMENT TO AFFECTED AREA TWICE DAILY TO HANDS AND ELBOWS FOR 21 DAYS      potassium gluconate 550 mg tablet Take 1 tablet by mouth daily      tiZANidine (ZANAFLEX) 4 MG tablet Take 4 mg by mouth every 8 hours as needed       albuterol sulfate  (90 Base) MCG/ACT inhaler Inhale 2 puffs into the lungs 4 times daily 1 Inhaler 5    ferrous sulfate (IRON 325) 325 (65 Fe) MG tablet Take 1 tablet by mouth daily (with breakfast) 90 tablet 5    oxyCODONE-acetaminophen (PERCOCET) 5-325 MG per tablet Take 1 tablet by mouth. Every 4-6 hours PRN      Cholecalciferol (VITAMIN D3) 5000 units TABS Take 1 tablet by mouth daily      Multiple Vitamin (MVI, BARIATRIC ADVANTAGE MULTI-FORMULA, CHEW TAB) Take 1 tablet by mouth daily 30 tablet 5    Calcium Citrate-Vitamin D (CALCIUM + VIT D, BARIATRIC ADVANTAGE, CHEWABLE TABLET) Take 1 tablet by mouth 2 times daily 120 tablet 5    gabapentin (NEURONTIN) 800 MG tablet Take 800 mg by mouth 3 times daily      dicyclomine (BENTYL) 10 MG capsule Take 1 capsule by mouth 3 times daily As needed for abdominal pain 15 capsule 3    ondansetron (ZOFRAN ODT) 4 MG disintegrating tablet Take 1 tablet by mouth every 8 hours as needed for Nausea 15 tablet 0    cholestyramine light 4 g packet Take 1 packet by mouth 3 times daily 60 packet 3    cloNIDine (CATAPRES) 0.1 MG tablet Take 1 tablet by mouth 2 times daily (Patient taking differently: Take 0.1 mg by mouth 2 times daily as needed for High Blood Pressure 05/27/21 Patient states she only takes as needed usually if SBP>150) 60 tablet 3    EQ PINK-BISMUTH 262 MG chewable tablet CHEW & SWALLOW 2 TABLETS BY MOUTH 4 TIMES DAILY BEFORE MEAL(S) AND NIGHTLY FOR 5 DAYS       No current facility-administered medications on file prior to encounter. ROS: No fevers or chills    Vital Signs: Body mass index is 46.35 kg/m².     Laboratory:  No results for input(s): WBC, HEMOGLOBIN,

## 2021-07-29 NOTE — PROGRESS NOTES
PROCEDURE PERFORMED: Port Dye Study     PRIMARY INDICATION FOR PROCEDURE:  Unable to draw blood from port. Difficult to flush    PT TRANSPORTED FROM: [facility]                            TO THE IR ROOM:  Large room    PT IN THE ROOM AT WHAT TIME: 0815                            INFORMED CONSENT:  PT A&O, verbalizes understanding of the procedure, signed consent with Dr. Ramin Morales. Consent placed in chart. IRIS SCORE PRE PROCEDURE: 10     NURSING ASSESSMENT: Pt A&O, independently transfers to the IR table. Multiple scars on chest.     TIME OUT COMPLETE: 0820    BARRIER PRECAUTIONS & STERILE TECHNIQUE  Pt transferred to the table and positioned for comfort. Warm blankets given. Pt in the supine position. Chlorhexadine and draped in a sterile fashion. PAIN/LOCAL ANESTHESIA/SEDATION MANAGEMENT:  None    INTRAOPERATIVE:    ACCESS TIME: 0822 with a staley needle  US/FLUORO: Fluoro  WIRE USED:  SHEATH USED:   CATHETER USED:  FINAL IMAGE TAKEN TO CONFIRM PLACEMENT OF:   CONTRAST/CC: Isovue 370 3 ml  FLUID RETURN: No fluid return   Port difficult to flush. Ministerio RT injected 3 ml contrast. Dr Ramin Morales at bedside. Dr Gabriela Mcguire spoke with patient. Dr Gabriela Mcguire attempted to contact Dr Gatito Cisse. Pt may discharge home per Dr Gabriela Mcguire.      STERILE DRESSINGS:  Band aid    SPECIMENS: None    EBL: No blood loss    FOLLOW- UP X-RAY: Not ordered    COMPLICATIONS: None    STAFF PRESENT DURING PROCEDURE: Dr Didi Horner RT, Luz MEJIA     IRIS SCORE POST PROCEDURE: 10    REPORT BEDSIDE TO: Remy Elizabeth RN [facility]    PT LEFT ROOM AT WHAT TIME: 0845    Pt transported back to [facility]

## 2021-07-29 NOTE — PROGRESS NOTES
0900 pt returned to [facility], report received from [address] [place] [address] Discharge instructions given to pt,informed pt she has office visit tomorrow at 3pm,  voiced understanding.    4730 pt escorted to elevator for discharge

## 2021-07-30 ENCOUNTER — OFFICE VISIT (OUTPATIENT)
Dept: BARIATRICS/WEIGHT MGMT | Age: 51
End: 2021-07-30
Payer: COMMERCIAL

## 2021-07-30 ENCOUNTER — TELEPHONE (OUTPATIENT)
Dept: BARIATRICS/WEIGHT MGMT | Age: 51
End: 2021-07-30

## 2021-07-30 VITALS
RESPIRATION RATE: 16 BRPM | WEIGHT: 273.2 LBS | HEIGHT: 64 IN | SYSTOLIC BLOOD PRESSURE: 118 MMHG | HEART RATE: 72 BPM | BODY MASS INDEX: 46.64 KG/M2 | OXYGEN SATURATION: 98 % | DIASTOLIC BLOOD PRESSURE: 72 MMHG

## 2021-07-30 DIAGNOSIS — Z95.828 PORT-A-CATH IN PLACE: ICD-10-CM

## 2021-07-30 DIAGNOSIS — Z98.84 STATUS POST LAPAROSCOPIC SLEEVE GASTRECTOMY: Primary | ICD-10-CM

## 2021-07-30 PROCEDURE — G8417 CALC BMI ABV UP PARAM F/U: HCPCS | Performed by: SURGERY

## 2021-07-30 PROCEDURE — 1036F TOBACCO NON-USER: CPT | Performed by: SURGERY

## 2021-07-30 PROCEDURE — 3017F COLORECTAL CA SCREEN DOC REV: CPT | Performed by: SURGERY

## 2021-07-30 PROCEDURE — 99214 OFFICE O/P EST MOD 30 MIN: CPT | Performed by: SURGERY

## 2021-07-30 PROCEDURE — G8427 DOCREV CUR MEDS BY ELIG CLIN: HCPCS | Performed by: SURGERY

## 2021-07-30 ASSESSMENT — ENCOUNTER SYMPTOMS
DIARRHEA: 0
SHORTNESS OF BREATH: 1
WHEEZING: 0
PHOTOPHOBIA: 0
BACK PAIN: 1
COLOR CHANGE: 0
TROUBLE SWALLOWING: 0
COUGH: 0
ANAL BLEEDING: 0
BLOOD IN STOOL: 0
CONSTIPATION: 0
VOMITING: 0
ABDOMINAL PAIN: 1
SORE THROAT: 0
ABDOMINAL DISTENTION: 1
VOICE CHANGE: 0
NAUSEA: 0

## 2021-07-30 NOTE — PROGRESS NOTES
BARIATRIC SURGERY OFFICE NOTE    SUBJECTIVE:    Patient presenting today originally referred from Dianne Wilkinson MD, MD, for   Chief Complaint   Patient presents with   Skip Self Other     consents   . HPI: Joannie Osler is a 44 y.o. female being seen for follow up for bariatric weight loss surgery. Robbin'slast month weight gain/loss was -.1 Lbs. Peanut butter, but lost one pant size, and did not loose weight. Port not working. . will fix this one place one or place another one in her Right IJ. And consented today for her RYGB. Thoroughly reviewed the patient's medical history, family history, social history and review of systems with the patient today in theoffice. Please see medical record for pertinent positives. Past Medical History:   Diagnosis Date    Acid reflux     Anemia     Anxiety     Arthritis     Hands, Back And Ankles    Bleeding ulcer 2014    \"I Had Ulcers In My Stomach And Colon\"/ per pt on 8/12/2019\"they said recently having some blood in my stomach- in July ( 2019)could not find where coming from \"    Bronchitis Last Episode 2014    Chronic back pain     Chronic pain     Sees Dr. Mercedes Montoya COPD (chronic obstructive pulmonary disease) ([facility] [address]     Sees Dr. Kirby Lanier Depression     Fibromyalgia Dx 2013    GERD (gastroesophageal reflux disease)     H/O echocardiogram 08/11/2015    EF >55%. LA to be at the upper limit of normal in size. LV hypertrophy with normal LV systolic, but abnormal diastolic function. Normal valvular structures and function.  H/O echocardiogram 08/30/2018    EF 55-60%    Hiatal hernia     History of blood transfusion 09/2015 And 2018    No Reaction To Blood Transfusions Received    History of sleeve gastrectomy     [place] (hard of hearing)     Right Ear    Hx of cardiac catheterization 10/24/2010    Angiographically normal coronary arteries w/ normal LV function and wall motion.     Hx of transesophageal echocardiography (PILO) for monitoring 12/02/2010    EF 55-60%. WNL. 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 name   Vergia Bodily cysts     \"they found that I have a kidney cyst but not sure which side\" per pt on 7/15/2020    Lupus ([address] Dx 2013    \"Rheumatoid Lupus\"    MDRO (multiple drug resistant organisms) resistance     4 months ago chest from mediport    Morbid obesity ([address]     MRSA bacteremia     Nausea     \"Most Of The Time\"    Osteomyelitis of fifth toe of left foot ([address]     Pain management     Sees Dr. Vlad Berger, Once A Month    Pancreatitis chronic Dx 2001    Pneumonia Dx 11-15    Seizures ([address]     Shortness of breath on exertion     Shortness of breath on exertion     Sleep apnea     Has CPAP\"no longer use the cpap since lost weight\"    Staph infection Dx 11-15    Left Foot    Staph infection Dx 11-15    \"Left Foot\"    Teeth missing     Upper And Lower    Thyroid disease     Wears glasses     To Read      Past Surgical History:   Procedure Laterality Date    APPENDECTOMY  02/1998    Done When Tubes And Ovaries Were Removed    CARDIAC CATHETERIZATION  10/24/2010    CATHETER REMOVAL N/A 7/16/2019    PORT REMOVAL performed by Nanette Whittaker MD at [address]  08/27/1991    CHOLECYSTECTOMY, LAPAROSCOPIC  1990's    COLONOSCOPY  Last Done In 2000's    DENTAL SURGERY      Teeth Extracted In Past    ENDOSCOPY, COLON, DIAGNOSTIC  Last Done In 2018    FOOT DEBRIDEMENT Left 6/16/2019    FIRST METATARSAL DEBRIDEMENT INCISION AND DRAINAGE. EXCISION OF ULCER.  RESECTION OF BONE. 1ST METATARSAL POWER LAVAGE AND BONE CEMENT performed by Masood Fox DPM at [address] UPPER GASTROINTESTINAL ENDOSCOPY N/A 5/28/2021    EGD BIOPSY performed by Fatmata Lomeli MD at [facility] ENDOSCOPY     Current Outpatient Medications   Medication Sig Dispense Refill    hydrOXYzine (VISTARIL) 50 MG capsule TAKE 1 CAPSULE BY MOUTH EVERY 6 HOURS AS NEEDED FOR ANXIETY 60 capsule 0    pantoprazole (PROTONIX) 40 MG tablet Take 1 tablet by mouth daily (with breakfast) 30 tablet 0    promethazine (PHENERGAN) 25 MG tablet TAKE 1 TABLET BY MOUTH EVERY 8 HOURS AS NEEDED FOR NAUSEA 30 tablet 0    SM SENNA-S 8.6-50 MG per tablet       scopolamine (TRANSDERM-SCOP) transdermal patch APPLY 1 PATCH TRANSDERMALLY TO THE SKIN BEHIND THE EAR ONCE EVERY 3 DAYS AS NEEDED 10 patch 0    ondansetron (ZOFRAN ODT) 4 MG disintegrating tablet Take 1 tablet by mouth every 8 hours as needed for Nausea 15 tablet 0    Melatonin 10 MG TABS Take 10-20 mg by mouth nightly as needed      Cyanocobalamin (VITAMIN B 12 PO) Take 1 tablet by mouth daily      meclizine (ANTIVERT) 25 MG tablet Take 25 mg by mouth 3 times daily as needed for Dizziness      dicyclomine (BENTYL) 10 MG capsule Take 1 capsule by mouth 3 times daily as needed (abdominal pain) 21 capsule 3    levETIRAcetam (KEPPRA) 1000 MG tablet Take 1 tablet by mouth 2 times daily 60 tablet 3    levothyroxine (SYNTHROID) 125 MCG tablet Take 1 tablet by mouth Daily 30 tablet 3    atenolol (TENORMIN) 25 MG tablet Take 1 tablet by mouth daily 30 tablet 3    cloNIDine (CATAPRES) 0.1 MG tablet Take 1 tablet by mouth 2 times daily (Patient taking differently: Take 0.1 mg by mouth 2 times daily as needed for High Blood Pressure 05/27/21 Patient states she only takes as needed usually if SBP>150) 60 tablet 3    estradiol (ESTRACE) 0.5 MG tablet Take 1 tablet by mouth daily (Patient taking differently: Take 0.5 mg by mouth nightly ) 21 tablet 3    PARoxetine (PAXIL) 30 MG tablet Take 1.5 tablets by mouth nightly (Patient taking differently: Take 30 mg by mouth nightly ) 30 tablet 3    betamethasone valerate (VALISONE) 0.1 % ointment APPLY OINTMENT TO AFFECTED AREA TWICE DAILY TO HANDS AND ELBOWS FOR 21 DAYS      potassium gluconate 550 mg tablet Take 1 tablet by mouth daily      tiZANidine (ZANAFLEX) 4 MG tablet Take 4 mg by mouth every 8 hours as needed       ferrous sulfate (IRON 325) 325 (65 Fe) MG tablet Take 1 tablet by mouth daily (with breakfast) 90 tablet 5    oxyCODONE-acetaminophen (PERCOCET) 5-325 MG per tablet Take 1 tablet by mouth. Every 4-6 hours PRN      Cholecalciferol (VITAMIN D3) 5000 units TABS Take 1 tablet by mouth daily      Multiple Vitamin (MVI, BARIATRIC ADVANTAGE MULTI-FORMULA, CHEW TAB) Take 1 tablet by mouth daily 30 tablet 5    Calcium Citrate-Vitamin D (CALCIUM + VIT D, BARIATRIC ADVANTAGE, CHEWABLE TABLET) Take 1 tablet by mouth 2 times daily 120 tablet 5    gabapentin (NEURONTIN) 800 MG tablet Take 800 mg by mouth 3 times daily      dicyclomine (BENTYL) 10 MG capsule Take 1 capsule by mouth 3 times daily As needed for abdominal pain 15 capsule 3    ondansetron (ZOFRAN ODT) 4 MG disintegrating tablet Take 1 tablet by mouth every 8 hours as needed for Nausea 15 tablet 0    cholestyramine light 4 g packet Take 1 packet by mouth 3 times daily (Patient not taking: Reported on 7/30/2021) 60 packet 3    albuterol (PROVENTIL) (2.5 MG/3ML) 0.083% nebulizer solution Take 3 mLs by nebulization every 6 hours as needed for Wheezing (Patient not taking: Reported on 7/30/2021) 120 each 0    albuterol sulfate  (90 Base) MCG/ACT inhaler Inhale 2 puffs into the lungs 4 times daily (Patient not taking: Reported on 7/30/2021) 1 Inhaler 5     No current facility-administered medications for this visit.       Allergies   Allergen Reactions    Aspirin Palpitations     \"My Heart Rate Elevates\"    Shellfish-Derived Products Swelling    Toradol [Ketorolac Tromethamine] Rash    Compazine [Prochlorperazine]     Haldol [Haloperidol]      Shaking      Reglan [Metoclopramide] Itching           Review of Systems   Constitutional: Positive for fatigue. Negative for activity change, chills, diaphoresis and fever. HENT: Negative for sore throat, trouble swallowing and voice change. Eyes: Negative for photophobia and visual disturbance. Respiratory: Positive for shortness of breath. Negative for cough and wheezing. Cardiovascular: Positive for leg swelling. Negative for chest pain and palpitations. Gastrointestinal: Positive for abdominal distention and abdominal pain. Negative for anal bleeding, blood in stool, constipation, diarrhea, nausea and vomiting. Endocrine: Positive for polyphagia. Negative for cold intolerance, heat intolerance, polydipsia and polyuria. Genitourinary: Positive for urgency. Negative for dysuria, frequency and hematuria. Musculoskeletal: Positive for arthralgias, back pain and gait problem. Negative for joint swelling, myalgias and neck stiffness. Skin: Negative for color change and rash. Neurological: Negative for seizures, speech difficulty, light-headedness and numbness. Hematological: Negative for adenopathy. Does not bruise/bleed easily. Psychiatric/Behavioral: Positive for sleep disturbance. The patient is nervous/anxious. OBJECTIVE:    Vitals:    07/30/21 1209   BP: 118/72   Pulse: 72   Resp: 16   SpO2: 98%     Body mass index is 46.53 kg/m². Physical Exam  Vitals reviewed. Constitutional:       General: She is not in acute distress. Appearance: She is well-developed. She is not diaphoretic. HENT:      Head: Normocephalic and atraumatic. Eyes:      General: No scleral icterus. Conjunctiva/sclera: Conjunctivae normal.      Pupils: Pupils are equal, round, and reactive to light. Neck:      Thyroid: No thyromegaly. Vascular: No JVD. Trachea: No tracheal deviation. Cardiovascular:      Rate and Rhythm: Normal rate and regular rhythm. Heart sounds: Normal heart sounds.  No murmur heard. No friction rub. No gallop. Pulmonary:      Effort: Pulmonary effort is normal. No respiratory distress. Breath sounds: No stridor. No wheezing or rales. Chest:      Chest wall: No tenderness. Abdominal:      General: Bowel sounds are normal. There is no distension. Palpations: Abdomen is soft. There is no mass. Tenderness: There is no abdominal tenderness. There is no guarding or rebound. Musculoskeletal:         General: No tenderness. Normal range of motion. Cervical back: Normal range of motion. Lymphadenopathy:      Cervical: No cervical adenopathy. Skin:     General: Skin is warm and dry. Coloration: Skin is not pale. Findings: No erythema or rash. Neurological:      Mental Status: She is alert and oriented to person, place, and time. Cranial Nerves: No cranial nerve deficit. Coordination: Coordination normal.   Psychiatric:         Behavior: Behavior normal.         Thought Content: Thought content normal.         Judgment: Judgment normal.         ASSESSMENT & PLAN:    1. Status post laparoscopic sleeve gastrectomy    2. Port-A-Cath in place         PORT first and then RYGB, counseled IN LENGTH, and will proceed. Patient was encouraged to journal all food intake. Keep calorie level atapproximately 3972-1288. Protein intake is to be a minimum of 60-80 grams per day. Water drinking was encouraged with a goal of 64oz-128oz daily. Beverages to be calorie free except for milk. Every other beverage should bewater. They are to avoid soda. Continue to increase level of physical activity. I counseled the patient regarding diet and exercise, in preparation for her planned Robotic TYGB AFTER fixing her PORT.     Counting calories, complying with the dietitian's recommendations, and complying with the preoperative workup including the dietitian counseling, exercise physiologist counseling,cardiologist evaluation and pre-operative optimization, pulmonologist evaluation and pre operative optimization, pre operative EGD evaluation. The patient expressed understanding and willingness to comply nicely; allquestions and concerns addressed in details. Patient counseled on the risks, benefits, and alternatives oftreatment plan at length while in the office today. Patient states an understanding and willingness to proceed with the plan. Follow Up:  Return in about 2 weeks (around 8/13/2021) for Surgery, Bariatric follow up: diet, exercise & weight loss.       Augusta Shetty MD, FACS, FICS  Member of the [id number] of [employer] and [employer]    [phone]    7/30/21

## 2021-07-30 NOTE — TELEPHONE ENCOUNTER
SPOKE [facility]ing [facility] ([id number] [facility] [address]) SCHEDULED @ [facility].  NOTIFIED OF DATES, TIMES AND LOCATION    PHONE ASSESSMENT /TANIA -  COVID -  Fully vacc  SURGERY - 8/3/21 12   P/O - 9/3/21 9    NPO AFTER MIDNIGHT

## 2021-08-02 ENCOUNTER — OFFICE VISIT (OUTPATIENT)
Dept: BARIATRICS/WEIGHT MGMT | Age: 51
End: 2021-08-02

## 2021-08-02 ENCOUNTER — ANESTHESIA EVENT (OUTPATIENT)
Dept: OPERATING ROOM | Age: 51
End: 2021-08-02
Payer: COMMERCIAL

## 2021-08-02 VITALS — BODY MASS INDEX: 46.55 KG/M2 | HEIGHT: 64 IN | WEIGHT: 272.7 LBS

## 2021-08-02 DIAGNOSIS — E66.01 MORBID OBESITY WITH BMI OF 45.0-49.9, ADULT (HCC): Primary | ICD-10-CM

## 2021-08-02 PROCEDURE — 99999 PR OFFICE/OUTPT VISIT,PROCEDURE ONLY: CPT

## 2021-08-02 RX ORDER — SODIUM CHLORIDE, SODIUM LACTATE, POTASSIUM CHLORIDE, CALCIUM CHLORIDE 600; 310; 30; 20 MG/100ML; MG/100ML; MG/100ML; MG/100ML
INJECTION, SOLUTION INTRAVENOUS CONTINUOUS
Status: CANCELLED | OUTPATIENT
Start: 2021-08-03

## 2021-08-02 ASSESSMENT — ENCOUNTER SYMPTOMS: SHORTNESS OF BREATH: 1

## 2021-08-02 ASSESSMENT — LIFESTYLE VARIABLES: SMOKING_STATUS: 0

## 2021-08-02 ASSESSMENT — COPD QUESTIONNAIRES: CAT_SEVERITY: MODERATE

## 2021-08-02 NOTE — PROGRESS NOTES
8/2/21 - Patient called, scheduled for PAT 8/3/21-arrival 0830 (for surgery 8/16/21) - Surgery for Mediport to follow @ 1200. 1. Do not eat or drink anything after midnight - unless instructed by your doctor prior to surgery. This includes                   no water, chewing gum or mints. 2. Follow your directions as prescribed by the doctor for your procedure and medications. Take Atenolol, Keppra, Levothyroxine,                   Pantoprazole in am. Clonidine if needed for BP. Have Scopolamine patch on.   3. Check with your Doctor regarding stopping Plavix, Coumadin, Lovenox,Effient,Pradaxa,Xarelto, Fragmin or other blood thinners and                   follow their instructions. 4. Do not smoke, and do not drink any alcoholic beverages 24 hours prior to surgery. This includes NA Beer. 5. You may brush your teeth and gargle the morning of surgery. DO NOT SWALLOW WATER   6. You MUST make arrangements for a responsible adult to take you home after your surgery and be able to check on you every couple                   hours for the day. You will not be allowed to leave alone or drive yourself home. It is strongly suggested someone stay with you the first 24                   hrs. Your surgery will be cancelled if you do not have a ride home. 7. Please wear simple, loose fitting clothing to the hospital.  Yousuf Sheikhetti not bring valuables (money, credit cards, checkbooks, etc.) Do not wear any                   makeup (including no eye makeup) or nail polish on your fingers or toes. 8. DO NOT wear any jewelry or piercings on day of surgery. All body piercing jewelry must be removed. 9. If you have dentures, they will be removed before going to the OR; we will provide you a container. If you wear contact lenses or glasses,                  they will be removed; please bring a case for them.            10. If you  have a Living Will and Durable Power of [facility] for Healthcare, please bring in a copy. 11. Please bring picture ID,  insurance card, paperwork from the doctors office    (H & P, Consent, & card for implantable devices). 12. Take a shower the night before or morning of your procedure, do not apply any lotion, oil or powder. 13. Wear a mask covering your nose & mouth when entering the hospital. Patient is vaccinated. Quarantine yourself after the test until after your surgery.

## 2021-08-02 NOTE — ANESTHESIA PRE PROCEDURE
Department of Anesthesiology  Preprocedure Note       Name:  Chalo Stock   Age:  44 y.o.  :  1970                                          MRN:  [id number]         Date:  2021      Surgeon: Dimple Cosby):  Josh Baldwin MD    Procedure: Procedure(s): MEDIPORT REVISION POSSIBLE REPLACEMENT    Medications prior to admission:   Prior to Admission medications    Medication Sig Start Date End Date Taking?  Authorizing Provider   hydrOXYzine (VISTARIL) 50 MG capsule TAKE 1 CAPSULE BY MOUTH EVERY 6 HOURS AS NEEDED FOR ANXIETY 21   Ines Mills MD   pantoprazole (PROTONIX) 40 MG tablet Take 1 tablet by mouth daily (with breakfast) 21   Kristan Albarran PA-C   promethazine (PHENERGAN) 25 MG tablet TAKE 1 TABLET BY MOUTH EVERY 8 HOURS AS NEEDED FOR NAUSEA 7/10/21   Josh Baldwin MD   SM SENNA-S 8.6-50 MG per tablet  21   Historical Provider, MD   scopolamine (TRANSDERM-SCOP) transdermal patch APPLY 1 PATCH TRANSDERMALLY TO THE SKIN BEHIND THE EAR ONCE EVERY 3 DAYS AS NEEDED 21   Jules Kiser PA-C   ondansetron (ZOFRAN ODT) 4 MG disintegrating tablet Take 1 tablet by mouth every 8 hours as needed for Nausea 21   Tracy Day DO   ondansetron (ZOFRAN ODT) 4 MG disintegrating tablet Take 1 tablet by mouth every 8 hours as needed for Nausea 21   Tracy Day DO   cholestyramine light 4 g packet Take 1 packet by mouth 3 times daily  Patient not taking: Reported on 2021   Barbie Batista MD   Melatonin 10 MG TABS Take 10-20 mg by mouth nightly as needed    Historical Provider, MD   Cyanocobalamin (VITAMIN B 12 PO) Take 1 tablet by mouth daily    Historical Provider, MD   meclizine (ANTIVERT) 25 MG tablet Take 25 mg by mouth 3 times daily as needed for Dizziness    Historical Provider, MD   dicyclomine (BENTYL) 10 MG capsule Take 1 capsule by mouth 3 times daily as needed (abdominal pain) 21   Ines Mills MD   levETIRAcetam (KEPPRA) 1000 MG tablet Take 1 tablet by mouth 2 times daily 5/13/21   Jimmy Amaro MD   levothyroxine (SYNTHROID) 125 MCG tablet Take 1 tablet by mouth Daily 5/13/21   Jimmy Amaro MD   atenolol (TENORMIN) 25 MG tablet Take 1 tablet by mouth daily 5/13/21   Jimmy Amaro MD   cloNIDine (CATAPRES) 0.1 MG tablet Take 1 tablet by mouth 2 times daily  Patient taking differently: Take 0.1 mg by mouth 2 times daily as needed for High Blood Pressure 05/27/21 Patient states she only takes as needed usually if SBP>150 5/13/21   Jimmy Amaro MD   estradiol (ESTRACE) 0.5 MG tablet Take 1 tablet by mouth daily  Patient taking differently: Take 0.5 mg by mouth nightly  5/13/21   Jimmy Amaro MD   PARoxetine (PAXIL) 30 MG tablet Take 1.5 tablets by mouth nightly  Patient taking differently: Take 30 mg by mouth nightly  5/13/21   Jimmy Amaro MD   albuterol (PROVENTIL) (2.5 MG/3ML) 0.083% nebulizer solution Take 3 mLs by nebulization every 6 hours as needed for Wheezing  Patient not taking: Reported on 7/30/2021 4/7/21   MICHAEL Tomlinson - CNP   betamethasone valerate (VALISONE) 0.1 % ointment APPLY OINTMENT TO AFFECTED AREA TWICE DAILY TO HANDS AND ELBOWS FOR 21 DAYS 3/26/21   Historical Provider, MD   potassium gluconate 550 mg tablet Take 1 tablet by mouth daily 3/25/21   Historical Provider, MD   tiZANidine (ZANAFLEX) 4 MG tablet Take 4 mg by mouth every 8 hours as needed  3/3/21   Historical Provider, MD   albuterol sulfate  (90 Base) MCG/ACT inhaler Inhale 2 puffs into the lungs 4 times daily  Patient not taking: Reported on 7/30/2021 2/8/21   Sinai Payne MD   ferrous sulfate (IRON 325) 325 (65 Fe) MG tablet Take 1 tablet by mouth daily (with breakfast) 10/23/20   Mac Vaz MD   oxyCODONE-acetaminophen (PERCOCET) 5-325 MG per tablet Take 1 tablet by mouth.  Every 4-6 hours PRN    Historical Provider, MD   Cholecalciferol (VITAMIN D3) 5000 units TABS Take 1 tablet by mouth daily    Historical Provider, MD Multiple Vitamin (MVI, BARIATRIC ADVANTAGE MULTI-FORMULA, CHEW TAB) Take 1 tablet by mouth daily 2/22/19   Edgardo Hayes MD   Calcium Citrate-Vitamin D (CALCIUM + VIT D, BARIATRIC ADVANTAGE, CHEWABLE TABLET) Take 1 tablet by mouth 2 times daily 2/22/19   Edgardo Hayes MD   gabapentin (NEURONTIN) 800 MG tablet Take 800 mg by mouth 3 times daily    Historical Provider, MD       Current medications:    Current Outpatient Medications   Medication Sig Dispense Refill    hydrOXYzine (VISTARIL) 50 MG capsule TAKE 1 CAPSULE BY MOUTH EVERY 6 HOURS AS NEEDED FOR ANXIETY 60 capsule 0    pantoprazole (PROTONIX) 40 MG tablet Take 1 tablet by mouth daily (with breakfast) 30 tablet 0    promethazine (PHENERGAN) 25 MG tablet TAKE 1 TABLET BY MOUTH EVERY 8 HOURS AS NEEDED FOR NAUSEA 30 tablet 0    SM SENNA-S 8.6-50 MG per tablet       scopolamine (TRANSDERM-SCOP) transdermal patch APPLY 1 PATCH TRANSDERMALLY TO THE SKIN BEHIND THE EAR ONCE EVERY 3 DAYS AS NEEDED 10 patch 0    ondansetron (ZOFRAN ODT) 4 MG disintegrating tablet Take 1 tablet by mouth every 8 hours as needed for Nausea 15 tablet 0    ondansetron (ZOFRAN ODT) 4 MG disintegrating tablet Take 1 tablet by mouth every 8 hours as needed for Nausea 15 tablet 0    cholestyramine light 4 g packet Take 1 packet by mouth 3 times daily (Patient not taking: Reported on 7/30/2021) 60 packet 3    Melatonin 10 MG TABS Take 10-20 mg by mouth nightly as needed      Cyanocobalamin (VITAMIN B 12 PO) Take 1 tablet by mouth daily      meclizine (ANTIVERT) 25 MG tablet Take 25 mg by mouth 3 times daily as needed for Dizziness      dicyclomine (BENTYL) 10 MG capsule Take 1 capsule by mouth 3 times daily as needed (abdominal pain) 21 capsule 3    levETIRAcetam (KEPPRA) 1000 MG tablet Take 1 tablet by mouth 2 times daily 60 tablet 3    levothyroxine (SYNTHROID) 125 MCG tablet Take 1 tablet by mouth Daily 30 tablet 3    atenolol (TENORMIN) 25 MG tablet Take 1 tablet by mouth daily 30 tablet 3    cloNIDine (CATAPRES) 0.1 MG tablet Take 1 tablet by mouth 2 times daily (Patient taking differently: Take 0.1 mg by mouth 2 times daily as needed for High Blood Pressure 05/27/21 Patient states she only takes as needed usually if SBP>150) 60 tablet 3    estradiol (ESTRACE) 0.5 MG tablet Take 1 tablet by mouth daily (Patient taking differently: Take 0.5 mg by mouth nightly ) 21 tablet 3    PARoxetine (PAXIL) 30 MG tablet Take 1.5 tablets by mouth nightly (Patient taking differently: Take 30 mg by mouth nightly ) 30 tablet 3    albuterol (PROVENTIL) (2.5 MG/3ML) 0.083% nebulizer solution Take 3 mLs by nebulization every 6 hours as needed for Wheezing (Patient not taking: Reported on 7/30/2021) 120 each 0    betamethasone valerate (VALISONE) 0.1 % ointment APPLY OINTMENT TO AFFECTED AREA TWICE DAILY TO HANDS AND ELBOWS FOR 21 DAYS      potassium gluconate 550 mg tablet Take 1 tablet by mouth daily      tiZANidine (ZANAFLEX) 4 MG tablet Take 4 mg by mouth every 8 hours as needed       albuterol sulfate  (90 Base) MCG/ACT inhaler Inhale 2 puffs into the lungs 4 times daily (Patient not taking: Reported on 7/30/2021) 1 Inhaler 5    ferrous sulfate (IRON 325) 325 (65 Fe) MG tablet Take 1 tablet by mouth daily (with breakfast) 90 tablet 5    oxyCODONE-acetaminophen (PERCOCET) 5-325 MG per tablet Take 1 tablet by mouth. Every 4-6 hours PRN      Cholecalciferol (VITAMIN D3) 5000 units TABS Take 1 tablet by mouth daily      Multiple Vitamin (MVI, BARIATRIC ADVANTAGE MULTI-FORMULA, CHEW TAB) Take 1 tablet by mouth daily 30 tablet 5    Calcium Citrate-Vitamin D (CALCIUM + VIT D, BARIATRIC ADVANTAGE, CHEWABLE TABLET) Take 1 tablet by mouth 2 times daily 120 tablet 5    gabapentin (NEURONTIN) 800 MG tablet Take 800 mg by mouth 3 times daily       No current facility-administered medications for this visit. Allergies:     Allergies   Allergen Reactions    Aspirin Palpitations \"My Heart Rate Elevates\"    Shellfish-Derived Products Swelling    Toradol [Ketorolac Tromethamine] Rash    Compazine [Prochlorperazine]     Haldol [Haloperidol]      Shaking      Reglan [Metoclopramide] Itching       Problem List:    Patient Active Problem List   Diagnosis Code    Fibromyalgia M79.7    Lupus (systemic lupus erythematosus) ([facility]) M32.9    Chronic pancreatitis ([facility]) K86.1    HTN (hypertension) I10    Frequent UTI N39.0    Gastroesophageal reflux disease without esophagitis K21.9    Chronic depression F32.9    Fatty liver disease, nonalcoholic J37.0    Arthritis M19.90    Bilateral low back pain with left-sided sciatica M54.42    Hiatal hernia K44.9    Status post laparoscopic sleeve gastrectomy Z98.84    Pseudoseizure F44.5    Chronic pain syndrome G89.4    Drug-seeking/Aberrant behavior Z76.5    Lymph node disorder I89.9    Morbid obesity with BMI of 40.0-44.9, adult ([facility]) E66.01, Z68.41    Iron deficiency anemia D50.9    Seizure disorder ([facility]) G40.909    Anxiety F41.9    RUQ pain R10.11    Status post bariatric surgery Z98.84    Morbid obesity with BMI of 45.0-49.9, adult ([facility]) E66.01, Z68.42    Discoloration of skin of flank resembling ecchymosis L81.9    Morbid obesity with BMI of 50.0-59.9, adult ([facility]) E66.01, Z68.43    Chest pain of uncertain etiology C41.7    Cellulitis of left thigh L03. 116    Malabsorption K90.9    COPD (chronic obstructive pulmonary disease) ([facility]) J44.9    Hypothyroidism due to acquired atrophy of thyroid E03.4    Osteopenia M85.80    Vitamin D deficiency E55.9    Irritable bowel syndrome K58.9    Malabsorption due to intolerance, not elsewhere classified K90.49    Port-A-Cath in place Z95.828       Past Medical History:        Diagnosis Date    Acid reflux     Anemia     Anxiety     Arthritis     Hands, Back And Ankles    Bleeding ulcer 2014    \"I Had Ulcers In My Stomach And Colon\"/ per pt on 8/12/2019\"they said recently having Sleep apnea     Has CPAP\"no longer use the cpap since lost weight\"    Staph infection Dx 11-15    Left Foot    Staph infection Dx 11-15    \"Left Foot\"    Teeth missing     Upper And Lower    Thyroid disease     Wears glasses     To Read       Past Surgical History:        Procedure Laterality Date    APPENDECTOMY  02/1998    Done When Tubes And Ovaries Were Removed    CARDIAC CATHETERIZATION  10/24/2010    CATHETER REMOVAL N/A 7/16/2019    PORT REMOVAL performed by Lamar Chavez MD at [address] [facility]  08/27/1991    CHOLECYSTECTOMY, LAPAROSCOPIC  1990's    COLONOSCOPY  Last Done In 2000's    DENTAL SURGERY      Teeth Extracted In Past    ENDOSCOPY, COLON, DIAGNOSTIC  Last Done In 2018    FOOT DEBRIDEMENT Left 6/16/2019    FIRST METATARSAL DEBRIDEMENT INCISION AND DRAINAGE. EXCISION OF ULCER.  RESECTION OF BONE. 1ST METATARSAL POWER LAVAGE AND BONE CEMENT performed by Nkechi Rayo DPM at [address] Left Last Done In 2016     Dr. Miki Park, \" About 6 Surgeries Done Because Of Staph Infection\"    HIATAL HERNIA REPAIR N/A 2/12/2019    HERNIA HIATAL LAPAROSCOPIC ROBOTIC performed by Lamar Chavez MD at [address] [place] [address]  10/1997    Partial Abdominal Hysterectomy    INSERTION / REMOVAL / REPLACEMENT VENOUS ACCESS CATHETER N/A 8/15/2019    PORT INSERTION performed by Lamar Chavez MD at [address] [facility]    removed after 6 months    LEG BIOPSY EXCISION Left 3/8/2021    LEFT THIGH LESION BIOPSY EXCISION performed by Lamar Chavez MD at [facility], PARTIAL Left 2008    Benign    OTHER SURGICAL HISTORY  02/1998    \"Tubes And Ovaries Removed, Appendectomy Also Done\"    OTHER SURGICAL HISTORY  Last Done 7-15-16    Mediport Insertion \"Total Of Six Done, Removed Last Mediport In 2014\"    PORT SURGERY N/A 1/15/2020    PORT REMOVAL performed by Lamar Chavez MD at [address] N/A 7/6/2020    PORT INSERTION performed by James Modi MD at [address] N/A 2/12/2019    GASTRECTOMY SLEEVE LAPAROSCOPIC ROBOTIC performed by James Modi MD at [address] [facility] ENDOSCOPY  08/27/2018    UPPER GASTROINTESTINAL ENDOSCOPY N/A 4/2/2019    EGD CONTROL HEMORRHAGE with epi injection at bleeding site performed by James Modi MD at [address] [facility] 6/19/2019    EGD DIAGNOSTIC ONLY performed by James Modi MD at [facility] [address] [facility] N/A 7/22/2019    EGD DIAGNOSTIC ONLY performed by Karlo Macdonald MD at [address] [facility] N/A 10/14/2019    EGD DIAGNOSTIC ONLY performed by James Modi MD at [facility] [address] [facility] N/A 7/17/2020    EGJ DILATATION BALLOON WITH 18-20 MM BALLOON, DILATED TO 20 MM. performed by James Modi MD at [facility] [address] [facility] N/A 5/28/2021    EGD BIOPSY performed by James Modi MD at [facility] History:    Social History     Tobacco Use    Smoking status: Never Smoker    Smokeless tobacco: Never Used   Substance Use Topics    Alcohol use: No     Alcohol/week: 0.0 standard drinks                                Counseling given: Not Answered      Vital Signs (Current): There were no vitals filed for this visit.                                            BP Readings from Last 3 Encounters:   07/30/21 118/72   07/29/21 137/76   07/27/21 (!) 106/43       NPO Status:                                                                                 BMI:   Wt Readings from Last 3 Encounters:   08/02/21 272 lb 11.2 oz (123.7 kg)   07/30/21 273 lb 3.2 oz (123.9 kg)   07/29/21 270 lb (122.5 kg)     There is no height or weight on file to calculate BMI.    CBC:   Lab Results   Component Value Date    WBC 3.8 07/16/2021    RBC 4.00 07/16/2021    HGB 10.5 07/16/2021    HCT 33.6 07/16/2021    MCV 84.0 07/16/2021    RDW 15.0 07/16/2021     07/16/2021       CMP:   Lab Results   Component Value Date     07/16/2021    K 4.3 07/16/2021     07/16/2021    CO2 22 07/16/2021    BUN 17 07/16/2021    CREATININE 0.8 07/16/2021    GFRAA >60 07/16/2021    LABGLOM >60 07/16/2021    GLUCOSE 87 07/16/2021    PROT 6.5 07/16/2021    PROT 6.5 11/18/2011    CALCIUM 9.8 07/16/2021    BILITOT 0.3 07/16/2021    ALKPHOS 109 07/16/2021    AST 19 07/16/2021    ALT 18 07/16/2021       POC Tests:   No results for input(s): POCGLU, POCNA, POCK, POCCL, POCBUN, POCHEMO, POCHCT in the last 72 hours.     Coags:   Lab Results   Component Value Date    PROTIME 10.4 02/12/2021    PROTIME 11.8 01/25/2021    INR 0.86 02/12/2021    APTT 30.4 02/12/2021       HCG (If Applicable):   Lab Results   Component Value Date    PREGTESTUR NEGATIVE 07/10/2017        ABGs: No results found for: PHART, PO2ART, TPX1EEN, FUA3RSA, BEART, Q3JOAALK     Type & Screen (If Applicable):  No results found for: LABABO, LABRH    Drug/Infectious Status (If Applicable):  Lab Results   Component Value Date    HEPCAB 0.18 11/18/2015       COVID-19 Screening (If Applicable):   Lab Results   Component Value Date    COVID19 NOT DETECTED 05/27/2021         Anesthesia Evaluation  Patient summary reviewed and Nursing notes reviewed no history of anesthetic complications:   Airway: Mallampati: II  TM distance: >3 FB   Neck ROM: full  Mouth opening: > = 3 FB Dental:          Pulmonary:   (+) COPD: moderate,  shortness of breath: chronic,  sleep apnea: on noncompliant,      (-) not a current smoker                           Cardiovascular:  Exercise tolerance: poor (<4 METS),   (+) hypertension:, PARSONS:,          Beta Blocker:  Dose within 24 Hrs      ROS comment: ECG:  Normal sinus rhythm   Normal ECG   When compared with ECG of 10-FEB-2021 11:05,   No significant change was found   Confirmed by Kenna Mahmood, SAYED ([id number]) on 3/4/2021 5:38:38 PM    STRESS TEST:   Stress Interpretation      normal stress echo, no regional wall motion abnormalities noted. normal   LVEF. The patient exercised according to the DOBUTAMINE for 12:41 min:s, achieving   a work level of Max. METS: 1.00. The resting heart rate of 82 bpm min to a maximal heart rate of   153 bpm. This value represents   91 % of the maximal, age-predicted heart rate. The resting blood pressure of   179/70 mmHg , min to a   maximum blood pressure of 196/80 mmHg. The exercise test was stopped due to   [address]    ECHO:  Summary   Left ventricular systolic function is normal.   Ejection fraction is visually estimated at 55%. Aneurysmal interatrial septum with positive bubble study. No evidence of thrombus within the left atrial appendage. Moderate to severe tricuspid regurgitation is present. No evidence of any pericardial effusion. No evidence of endocarditis. Signature      ------------------------------------------------------------------   Electronically signed by Lia Cochran MD   (Interpreting physician) on 01/16/2020 at 05:53 PM   ------------------------------------------------------------------     Neuro/Psych:   (+) seizures:, neuromuscular disease ( Fibromyalgia):, psychiatric history ( Pseudoseizure):depression/anxiety              ROS comment: Drug-seeking/Aberrant behavior GI/Hepatic/Renal:   (+) hiatal hernia, GERD:, PUD, liver disease ( Fatty liver disease, nonalcoholic):, morbid obesity (bmi 47.1)         ROS comment: Pancreatitis chronic. Endo/Other:    (+) hypothyroidism: arthritis: OA., . Pt had no PAT visit        ROS comment: Lupus ([facility] [address] \"Rheumatoid Lupus\"   Osteomyelitis of fifth toe of left foot (HCC) Abdominal:             Vascular: negative vascular ROS. Other Findings:             Anesthesia Plan      MAC     ASA 3       Induction: intravenous.       Anesthetic plan and risks discussed with patient. Plan discussed with CRNA.               MICHAEL Haas - JOSE MARIA   8/2/2021

## 2021-08-02 NOTE — PROGRESS NOTES
motion.  Hx of transesophageal echocardiography (PILO) for monitoring 12/02/2010    EF 55-60%. WNL. 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 name   Pelon Nocatee cysts     \"they found that I have a kidney cyst but not sure which side\" per pt on 7/15/2020    Lupus ([address] Dx 2013    \"Rheumatoid Lupus\"    MDRO (multiple drug resistant organisms) resistance     4 months ago chest from mediport    Morbid obesity ([address]     MRSA bacteremia     Nausea     \"Most Of The Time\"    Osteomyelitis of fifth toe of left foot ([address]     Pain management     Sees Dr. Miracle Cervantes, Once A Month    Pancreatitis chronic Dx 2001    Pneumonia Dx 11-15    Seizures ([address]     Shortness of breath on exertion     Shortness of breath on exertion     Sleep apnea     Has CPAP\"no longer use the cpap since lost weight\"    Staph infection Dx 11-15    Left Foot    Staph infection Dx 11-15    \"Left Foot\"    Teeth missing     Upper And Lower    Thyroid disease     Wears glasses     To Read   . Review of Systems - Review of Systems  Otherwise per HPI. Allergies:   Allergies   Allergen Reactions    Aspirin Palpitations     \"My Heart Rate Elevates\"    Shellfish-Derived Products Swelling    Toradol [Ketorolac Tromethamine] Rash    Compazine [Prochlorperazine]     Haldol [Haloperidol]      Shaking      Reglan [Metoclopramide] Itching       Past Surgical History:  Past Surgical History:   Procedure Laterality Date    APPENDECTOMY  02/1998    Done When Tubes And Ovaries Were Removed    CARDIAC CATHETERIZATION  10/24/2010    CATHETER REMOVAL N/A 7/16/2019    PORT REMOVAL performed by Cassie Freeman MD at [facility]  08/27/1991  CHOLECYSTECTOMY, LAPAROSCOPIC  1990's    COLONOSCOPY  Last Done In 2000's   [facility] DENTAL SURGERY      Teeth Extracted In Past    ENDOSCOPY, COLON, DIAGNOSTIC  Last Done In 2018    FOOT DEBRIDEMENT Left 6/16/2019    FIRST METATARSAL DEBRIDEMENT INCISION AND DRAINAGE. EXCISION OF ULCER.  RESECTION OF BONE. 1ST METATARSAL POWER LAVAGE AND BONE CEMENT performed by Vikki Romero DPM at [address] Left Last Done In 2016     Dr. Jose Wu, \" About 6 Surgeries Done Because Of Staph Infection\"    HIATAL HERNIA REPAIR N/A 2/12/2019    HERNIA HIATAL LAPAROSCOPIC ROBOTIC performed by Josh Baldwin MD at [address] [place] [address]  10/1997    Partial Abdominal Hysterectomy    INSERTION / REMOVAL / REPLACEMENT VENOUS ACCESS CATHETER N/A 8/15/2019    PORT INSERTION performed by Josh Baldwin MD at [facility]    removed after 6 months    LEG BIOPSY EXCISION Left 3/8/2021    LEFT THIGH LESION BIOPSY EXCISION performed by Josh Baldwin MD at [facility], PARTIAL Left 2008    Benign    OTHER SURGICAL HISTORY  02/1998    \"Tubes And Ovaries Removed, Appendectomy Also Done\"    OTHER SURGICAL HISTORY  Last Done 7-15-16    Mediport Insertion \"Total Of Six Done, Removed Last Mediport In 2014\"    PORT SURGERY N/A 1/15/2020    PORT REMOVAL performed by Josh Baldwin MD at [address] N/A 7/6/2020    PORT INSERTION performed by Josh Baldwin MD at [address] [place] [address] N/A 2/12/2019    GASTRECTOMY SLEEVE LAPAROSCOPIC ROBOTIC performed by Josh Baldwin MD at [address] [place] [address]  08/27/2018    UPPER GASTROINTESTINAL ENDOSCOPY N/A 4/2/2019    EGD CONTROL HEMORRHAGE with epi injection at bleeding site performed by Josh Baldwin MD at [place] [address] 6/19/2019    EGD DIAGNOSTIC ONLY performed by Josh Baldwin MD at [place] [address] UPPER GASTROINTESTINAL ENDOSCOPY N/A 7/22/2019    EGD DIAGNOSTIC ONLY performed by Real Aleman MD at [facility] N/A 10/14/2019    EGD DIAGNOSTIC ONLY performed by Lynsey Scott MD at [address] [facility] N/A 7/17/2020    EGJ DILATATION BALLOON WITH 18-20 MM BALLOON, DILATED TO 20 MM. performed by Lynsey Scott MD at [facility] N/A 5/28/2021    EGD BIOPSY performed by Lynsey Scott MD at [facility] ENDOSCOPY       Family History:  Family History   Problem Relation Age of Onset    Diabetes Mother         \"Borderline Diabetes\"    High Blood Pressure Mother     Obesity Mother     Arthritis Mother     Heart Disease Mother     High Cholesterol Mother     Vision Loss Mother     Diabetes Father     High Blood Pressure Father     Asthma Father     Cancer Father         prostate cancer    High Blood Pressure Brother     Asthma Son     Vision Loss Son     Lupus Daughter     Other Daughter         \"Alot Of Female Problems\"       Social History:  Social History     Socioeconomic History    Marital status:      Spouse name: Not on file    Number of children: Not on file    Years of education: Not on file    Highest education level: Not on file   Occupational History    Not on file   Tobacco Use    Smoking status: Never Smoker    Smokeless tobacco: Never Used   Vaping Use    Vaping Use: Never used   Substance and Sexual Activity    Alcohol use: No     Alcohol/week: 0.0 standard drinks    Drug use: No    Sexual activity: Not Currently   Other Topics Concern    Not on file   Social History Narrative    Not on file     Social Determinants of Health     Financial Resource Strain:     Difficulty of Paying Living Expenses:    Food Insecurity:     Worried About Running Out of Food in the Last Year:     [address] in the Last Year:    Transportation Needs:     Lack of Transportation (Medical):  Lack of Transportation (Non-Medical):    Physical Activity:     Days of Exercise per Week:     Minutes of Exercise per Session:    Stress:     Feeling of Stress :    Social Connections:     Frequency of Communication with Friends and Family:     Frequency of Social Gatherings with Friends and Family:     Attends [employer] Services:     Active Member of Clubs or Organizations:     Attends Club or Organization Meetings:     Marital Status:    Intimate Partner Violence:     Fear of Current or Ex-Partner:     Emotionally Abused:     Physically Abused:     Sexually Abused:          OBJECTIVE:  Physical Exam   Ht 5' 4.25\" (1.632 m)   Wt 272 lb 11.2 oz (123.7 kg)   BMI 46.45 kg/m²        NUTRITION DIAGNOSIS: Overweight / Obesity   Problem: Increased adiposity compared to reference standard or established norms   Etiology: Excess intake compared to output over time   S/S: Ht: m64.25\" Wt: 272.7 lbs BMI: 46.45    NUTRITION INTERVENTIONS:    Individualized treatment goals to address nutritiondiagnosis:   Instructed on 600-800 kcal diet for weight loss post-op   Provided diet progression handouts   Encouraged Physical activity as approved by physician    MONITORING/ EVALUATION/ PLAN:   Pt verbalized understanding of allmaterials covered   Pt asked pertinent questions throughout the session - expect compliance with nutrition guidelines presented   Provided pt with contact information should questions arise prior to next visit   Will f/u with pt post-op  GABRIEL Gale MS, RDN, LD  8/2/2021

## 2021-08-03 ENCOUNTER — ANESTHESIA (OUTPATIENT)
Dept: OPERATING ROOM | Age: 51
End: 2021-08-03
Payer: COMMERCIAL

## 2021-08-03 ENCOUNTER — APPOINTMENT (OUTPATIENT)
Dept: GENERAL RADIOLOGY | Age: 51
End: 2021-08-03
Attending: SURGERY
Payer: COMMERCIAL

## 2021-08-03 ENCOUNTER — HOSPITAL ENCOUNTER (OUTPATIENT)
Dept: PREADMISSION TESTING | Age: 51
Setting detail: OUTPATIENT SURGERY
Discharge: HOME | DRG: 620 | End: 2021-08-07
Payer: COMMERCIAL

## 2021-08-03 ENCOUNTER — HOSPITAL ENCOUNTER (OUTPATIENT)
Age: 51
Setting detail: OUTPATIENT SURGERY
Discharge: HOME | End: 2021-08-03
Attending: SURGERY | Admitting: SURGERY
Payer: COMMERCIAL

## 2021-08-03 ENCOUNTER — HOSPITAL ENCOUNTER (OUTPATIENT)
Age: 51
Discharge: HOME | End: 2021-08-03
Payer: COMMERCIAL

## 2021-08-03 VITALS
HEIGHT: 64 IN | RESPIRATION RATE: 16 BRPM | SYSTOLIC BLOOD PRESSURE: 151 MMHG | TEMPERATURE: 98.1 F | BODY MASS INDEX: 46.98 KG/M2 | WEIGHT: 275.2 LBS | HEART RATE: 72 BPM | OXYGEN SATURATION: 96 % | DIASTOLIC BLOOD PRESSURE: 86 MMHG

## 2021-08-03 VITALS — SYSTOLIC BLOOD PRESSURE: 102 MMHG | DIASTOLIC BLOOD PRESSURE: 78 MMHG | OXYGEN SATURATION: 88 %

## 2021-08-03 VITALS
TEMPERATURE: 97.6 F | WEIGHT: 274 LBS | BODY MASS INDEX: 46.78 KG/M2 | HEART RATE: 56 BPM | SYSTOLIC BLOOD PRESSURE: 148 MMHG | HEIGHT: 64 IN | DIASTOLIC BLOOD PRESSURE: 76 MMHG | OXYGEN SATURATION: 94 % | RESPIRATION RATE: 16 BRPM

## 2021-08-03 DIAGNOSIS — Z95.828 PORT-A-CATH IN PLACE: Primary | ICD-10-CM

## 2021-08-03 LAB
ALBUMIN SERPL-MCNC: 4.8 GM/DL (ref 3.4–5)
ALP BLD-CCNC: 117 IU/L (ref 40–129)
ALT SERPL-CCNC: 33 U/L (ref 10–40)
AMPHETAMINES: NEGATIVE
ANION GAP SERPL CALCULATED.3IONS-SCNC: 10 MMOL/L (ref 4–16)
AST SERPL-CCNC: 29 IU/L (ref 15–37)
BARBITURATE SCREEN URINE: NEGATIVE
BASOPHILS ABSOLUTE: 0 K/CU MM
BASOPHILS RELATIVE PERCENT: 1 % (ref 0–1)
BENZODIAZEPINE SCREEN, URINE: NEGATIVE
BILIRUB SERPL-MCNC: 0.2 MG/DL (ref 0–1)
BUN BLDV-MCNC: 22 MG/DL (ref 6–23)
CALCIUM SERPL-MCNC: 9.6 MG/DL (ref 8.3–10.6)
CANNABINOID SCREEN URINE: NEGATIVE
CHLORIDE BLD-SCNC: 106 MMOL/L (ref 99–110)
CO2: 24 MMOL/L (ref 21–32)
COCAINE METABOLITE: NEGATIVE
CREAT SERPL-MCNC: 0.7 MG/DL (ref 0.6–1.1)
DIFFERENTIAL TYPE: ABNORMAL
EOSINOPHILS ABSOLUTE: 0.2 K/CU MM
EOSINOPHILS RELATIVE PERCENT: 5.3 % (ref 0–3)
ESTIMATED AVERAGE GLUCOSE: 103 MG/DL
GFR AFRICAN AMERICAN: >60 ML/MIN/1.73M2
GFR NON-AFRICAN AMERICAN: >60 ML/MIN/1.73M2
GLUCOSE BLD-MCNC: 82 MG/DL (ref 70–99)
HBA1C MFR BLD: 5.2 % (ref 4.2–6.3)
HCT VFR BLD CALC: 38 % (ref 37–47)
HEMOGLOBIN: 11.8 GM/DL (ref 12.5–16)
IMMATURE NEUTROPHIL %: 0.2 % (ref 0–0.43)
LYMPHOCYTES ABSOLUTE: 1.6 K/CU MM
LYMPHOCYTES RELATIVE PERCENT: 38.8 % (ref 24–44)
MCH RBC QN AUTO: 26.2 PG (ref 27–31)
MCHC RBC AUTO-ENTMCNC: 31.1 % (ref 32–36)
MCV RBC AUTO: 84.4 FL (ref 78–100)
MONOCYTES ABSOLUTE: 0.3 K/CU MM
MONOCYTES RELATIVE PERCENT: 7.9 % (ref 0–4)
NUCLEATED RBC %: 0 %
OPIATES, URINE: NEGATIVE
OXYCODONE: ABNORMAL
PDW BLD-RTO: 14.9 % (ref 11.7–14.9)
PHENCYCLIDINE, URINE: NEGATIVE
PLATELET # BLD: 194 K/CU MM (ref 140–440)
PMV BLD AUTO: 10.5 FL (ref 7.5–11.1)
POTASSIUM SERPL-SCNC: 4.5 MMOL/L (ref 3.5–5.1)
RBC # BLD: 4.5 M/CU MM (ref 4.2–5.4)
SEGMENTED NEUTROPHILS ABSOLUTE COUNT: 2 K/CU MM
SEGMENTED NEUTROPHILS RELATIVE PERCENT: 46.8 % (ref 36–66)
SODIUM BLD-SCNC: 140 MMOL/L (ref 135–145)
TOTAL IMMATURE NEUTOROPHIL: 0.01 K/CU MM
TOTAL NUCLEATED RBC: 0 K/CU MM
TOTAL PROTEIN: 7.2 GM/DL (ref 6.4–8.2)
WBC # BLD: 4.2 K/CU MM (ref 4–10.5)

## 2021-08-03 PROCEDURE — 80307 DRUG TEST PRSMV CHEM ANLYZR: CPT

## 2021-08-03 PROCEDURE — 2580000003 HC RX 258: Performed by: SURGERY

## 2021-08-03 PROCEDURE — 2580000003 HC RX 258: Performed by: ANESTHESIOLOGY

## 2021-08-03 PROCEDURE — 3700000001 HC ADD 15 MINUTES (ANESTHESIA): Performed by: SURGERY

## 2021-08-03 PROCEDURE — 80053 COMPREHEN METABOLIC PANEL: CPT

## 2021-08-03 PROCEDURE — 2709999900 HC NON-CHARGEABLE SUPPLY: Performed by: SURGERY

## 2021-08-03 PROCEDURE — 6360000002 HC RX W HCPCS: Performed by: SURGERY

## 2021-08-03 PROCEDURE — 85025 COMPLETE CBC W/AUTO DIFF WBC: CPT

## 2021-08-03 PROCEDURE — 7100000011 HC PHASE II RECOVERY - ADDTL 15 MIN: Performed by: SURGERY

## 2021-08-03 PROCEDURE — C1788 PORT, INDWELLING, IMP: HCPCS | Performed by: SURGERY

## 2021-08-03 PROCEDURE — 2500000003 HC RX 250 WO HCPCS: Performed by: NURSE ANESTHETIST, CERTIFIED REGISTERED

## 2021-08-03 PROCEDURE — 6360000002 HC RX W HCPCS: Performed by: NURSE ANESTHETIST, CERTIFIED REGISTERED

## 2021-08-03 PROCEDURE — 2500000003 HC RX 250 WO HCPCS: Performed by: SURGERY

## 2021-08-03 PROCEDURE — C1894 INTRO/SHEATH, NON-LASER: HCPCS | Performed by: SURGERY

## 2021-08-03 PROCEDURE — 3600000013 HC SURGERY LEVEL 3 ADDTL 15MIN: Performed by: SURGERY

## 2021-08-03 PROCEDURE — 76000 FLUOROSCOPY <1 HR PHYS/QHP: CPT

## 2021-08-03 PROCEDURE — 36415 COLL VENOUS BLD VENIPUNCTURE: CPT

## 2021-08-03 PROCEDURE — 3600000003 HC SURGERY LEVEL 3 BASE: Performed by: SURGERY

## 2021-08-03 PROCEDURE — 83036 HEMOGLOBIN GLYCOSYLATED A1C: CPT

## 2021-08-03 PROCEDURE — 71045 X-RAY EXAM CHEST 1 VIEW: CPT

## 2021-08-03 PROCEDURE — C1769 GUIDE WIRE: HCPCS | Performed by: SURGERY

## 2021-08-03 PROCEDURE — 36589 REMOVAL TUNNELED CV CATH: CPT | Performed by: SURGERY

## 2021-08-03 PROCEDURE — 3700000000 HC ANESTHESIA ATTENDED CARE: Performed by: SURGERY

## 2021-08-03 PROCEDURE — 7100000010 HC PHASE II RECOVERY - FIRST 15 MIN: Performed by: SURGERY

## 2021-08-03 DEVICE — PORT INFUS L55CM 0.016ML 0.4ML CATH OD2.2MM ID1.4MM INTRO: Type: IMPLANTABLE DEVICE | Site: CHEST | Status: FUNCTIONAL

## 2021-08-03 RX ORDER — OXYCODONE HYDROCHLORIDE AND ACETAMINOPHEN 5; 325 MG/1; MG/1
1-2 TABLET ORAL EVERY 6 HOURS PRN
Qty: 35 TABLET | Refills: 0 | Status: SHIPPED | OUTPATIENT
Start: 2021-08-03 | End: 2021-08-10

## 2021-08-03 RX ORDER — ONDANSETRON 2 MG/ML
4 INJECTION INTRAMUSCULAR; INTRAVENOUS ONCE
Status: CANCELLED | OUTPATIENT
Start: 2021-08-16

## 2021-08-03 RX ORDER — FENTANYL CITRATE 50 UG/ML
INJECTION, SOLUTION INTRAMUSCULAR; INTRAVENOUS PRN
Status: DISCONTINUED | OUTPATIENT
Start: 2021-08-03 | End: 2021-08-03 | Stop reason: SDUPTHER

## 2021-08-03 RX ORDER — HEPARIN SODIUM 5000 [USP'U]/ML
5000 INJECTION, SOLUTION INTRAVENOUS; SUBCUTANEOUS ONCE
Status: DISCONTINUED | OUTPATIENT
Start: 2021-08-03 | End: 2021-08-03 | Stop reason: CLARIF

## 2021-08-03 RX ORDER — SODIUM CHLORIDE, SODIUM LACTATE, POTASSIUM CHLORIDE, CALCIUM CHLORIDE 600; 310; 30; 20 MG/100ML; MG/100ML; MG/100ML; MG/100ML
INJECTION, SOLUTION INTRAVENOUS CONTINUOUS
Status: DISCONTINUED | OUTPATIENT
Start: 2021-08-03 | End: 2021-08-03 | Stop reason: HOSPADM

## 2021-08-03 RX ORDER — HEPARIN SODIUM 5000 [USP'U]/ML
5000 INJECTION, SOLUTION INTRAVENOUS; SUBCUTANEOUS ONCE
Status: CANCELLED | OUTPATIENT
Start: 2021-08-16

## 2021-08-03 RX ORDER — LIDOCAINE HYDROCHLORIDE 20 MG/ML
INJECTION, SOLUTION INFILTRATION; PERINEURAL PRN
Status: DISCONTINUED | OUTPATIENT
Start: 2021-08-03 | End: 2021-08-03 | Stop reason: SDUPTHER

## 2021-08-03 RX ORDER — PROPOFOL 10 MG/ML
INJECTION, EMULSION INTRAVENOUS PRN
Status: DISCONTINUED | OUTPATIENT
Start: 2021-08-03 | End: 2021-08-03 | Stop reason: SDUPTHER

## 2021-08-03 RX ORDER — HEPARIN SODIUM (PORCINE) LOCK FLUSH IV SOLN 100 UNIT/ML 100 UNIT/ML
SOLUTION INTRAVENOUS
Status: COMPLETED | OUTPATIENT
Start: 2021-08-03 | End: 2021-08-03

## 2021-08-03 RX ORDER — AMOXICILLIN 250 MG
2 CAPSULE ORAL DAILY
Qty: 60 TABLET | Refills: 3 | Status: SHIPPED | OUTPATIENT
Start: 2021-08-03 | End: 2021-08-13

## 2021-08-03 RX ORDER — SODIUM CHLORIDE, SODIUM LACTATE, POTASSIUM CHLORIDE, CALCIUM CHLORIDE 600; 310; 30; 20 MG/100ML; MG/100ML; MG/100ML; MG/100ML
INJECTION, SOLUTION INTRAVENOUS CONTINUOUS
Status: CANCELLED | OUTPATIENT
Start: 2021-08-16

## 2021-08-03 RX ORDER — LIDOCAINE HYDROCHLORIDE 20 MG/ML
INJECTION, SOLUTION INTRAVENOUS PRN
Status: DISCONTINUED | OUTPATIENT
Start: 2021-08-03 | End: 2021-08-03 | Stop reason: SDUPTHER

## 2021-08-03 RX ORDER — SCOLOPAMINE TRANSDERMAL SYSTEM 1 MG/1
1 PATCH, EXTENDED RELEASE TRANSDERMAL
Status: CANCELLED | OUTPATIENT
Start: 2021-08-16

## 2021-08-03 RX ORDER — KETAMINE HCL 50MG/ML(1)
SYRINGE (ML) INTRAVENOUS PRN
Status: DISCONTINUED | OUTPATIENT
Start: 2021-08-03 | End: 2021-08-03 | Stop reason: SDUPTHER

## 2021-08-03 RX ADMIN — FENTANYL CITRATE 75 MCG: 50 INJECTION, SOLUTION INTRAMUSCULAR; INTRAVENOUS at 12:29

## 2021-08-03 RX ADMIN — LIDOCAINE HYDROCHLORIDE 100 MG: 20 INJECTION, SOLUTION INFILTRATION; PERINEURAL at 12:29

## 2021-08-03 RX ADMIN — Medication 25 MG: at 12:51

## 2021-08-03 RX ADMIN — CEFAZOLIN SODIUM 3000 MG: 1 INJECTION, POWDER, FOR SOLUTION INTRAMUSCULAR; INTRAVENOUS at 12:38

## 2021-08-03 RX ADMIN — Medication 25 MG: at 12:29

## 2021-08-03 RX ADMIN — Medication 25 MG: at 12:41

## 2021-08-03 RX ADMIN — SODIUM CHLORIDE, POTASSIUM CHLORIDE, SODIUM LACTATE AND CALCIUM CHLORIDE: 600; 310; 30; 20 INJECTION, SOLUTION INTRAVENOUS at 11:26

## 2021-08-03 RX ADMIN — Medication 25 MG: at 13:09

## 2021-08-03 RX ADMIN — FENTANYL CITRATE 25 MCG: 50 INJECTION, SOLUTION INTRAMUSCULAR; INTRAVENOUS at 12:58

## 2021-08-03 RX ADMIN — LIDOCAINE HYDROCHLORIDE 100 MG: 20 INJECTION, SOLUTION INTRAVENOUS at 12:29

## 2021-08-03 RX ADMIN — PROPOFOL 520 MG: 10 INJECTION, EMULSION INTRAVENOUS at 12:29

## 2021-08-03 ASSESSMENT — PULMONARY FUNCTION TESTS
PIF_VALUE: 0
PIF_VALUE: 0
PIF_VALUE: 1
PIF_VALUE: 0
PIF_VALUE: 1
PIF_VALUE: 0
PIF_VALUE: 1
PIF_VALUE: 0
PIF_VALUE: 1
PIF_VALUE: 0
PIF_VALUE: 1
PIF_VALUE: 0
PIF_VALUE: 1
PIF_VALUE: 0
PIF_VALUE: 1
PIF_VALUE: 0
PIF_VALUE: 1
PIF_VALUE: 0
PIF_VALUE: 1
PIF_VALUE: 0
PIF_VALUE: 0
PIF_VALUE: 1
PIF_VALUE: 0
PIF_VALUE: 1
PIF_VALUE: 0
PIF_VALUE: 1
PIF_VALUE: 1
PIF_VALUE: 0
PIF_VALUE: 1
PIF_VALUE: 0

## 2021-08-03 ASSESSMENT — PAIN DESCRIPTION - ORIENTATION: ORIENTATION: RIGHT

## 2021-08-03 ASSESSMENT — PAIN SCALES - GENERAL
PAINLEVEL_OUTOF10: 4
PAINLEVEL_OUTOF10: 0
PAINLEVEL_OUTOF10: 0

## 2021-08-03 ASSESSMENT — PAIN - FUNCTIONAL ASSESSMENT: PAIN_FUNCTIONAL_ASSESSMENT: 0-10

## 2021-08-03 ASSESSMENT — PAIN DESCRIPTION - PAIN TYPE: TYPE: CHRONIC PAIN

## 2021-08-03 ASSESSMENT — PAIN DESCRIPTION - LOCATION: LOCATION: ABDOMEN

## 2021-08-03 ASSESSMENT — PAIN DESCRIPTION - DESCRIPTORS: DESCRIPTORS: ACHING

## 2021-08-03 NOTE — OP NOTE
MEDIPORT REMOVAL OPERATIVE PROCEDURE NOTE    Denise Hernández, 1970, 44 y.o.,  female, CSN: [id number]  August 3, 2021    PRE-OP DIAGNOSIS: Malfunctioning mediport. POST-OP DIAGNOSIS: Same    PROCEDURE: REMOVAL of her 6.6-Italian, low profile Mediport from the left  subclavian vein approach. SURGEON(S):   Kalie Pinon M.D., F.TYRESE.CToreyS., F.I.CToreyS. ASSISTANT(S):  NONE    ANESTHESIA: MAC + Local 1% Xylocaine with Epinephrine, 0.5% Marcaine, mixed, 50% : 50%    SPECIMENS: NONE.    ESTIMATED BLOOD LOSS:  Less then 5 ml           DRAIN: NONE    COMPLICATIONS: NONE           CONDITION / DISPOSITION:  Stable, to PACU     OPERATIVE DESCRIPTION:      After proper informed consent was signed by the  patient with all the risks, benefits, potential complications, and possible  alternatives of the procedure, including but not limited to bleeding,  infection, amongst  others, the patient was properly identified. In the holding area, she received  2 grams of Ancef IV. TEDs and SCDs were placed on her legs and activated  bilaterally. she voided her urinary bladder prior to OR, and Hibiclens skin  prep was performed of the upper chest and lower neck bilaterally. she was  taken to the operating room and was placed supine on the operating room  table, and after institution of general IV sedation. The patient was placed in very mild   Trendelenburg position. The bilateral upper chest and neck all the way to  the chin were prepped and draped in the usual sterile fashion.      An elliptical incision along and around the scar on top of the port was made, dissection was carried down to the pseudocapsule, the ellipse of skin was excised, the pseudocapsule was entered and the 3 2-0 Prolene ties were excised, the the port was pulled back slowly and exposed the cathter, and then a 0.035 guidewire was inserted through the catheter, but could NOT pass the new catheter due to a \"tight spot\" midway between the cubclavian vein and the tip of the guidewire, then decision was made to remove the guidewire, and perform a NEW stick, which was done uneventfully as described below. ____________________________________________    Zaida Reed MD, FACS, FICS    8/3/2021  2:25 PM                MEDIPORT OPERATIVE PROCEDURE NOTE    Alexandra Pope, 1970, 44 y.o.,  female, CSN: [id number]  August 3, 2021    PRE-OP DIAGNOSIS: Malfunctioning left SCV Mediport       POST-OP DIAGNOSIS: Same    PROCEDURE: Placement of an 6.6-Paraguayan, low profile Mediport via left  subclavian vein approach, using the micropuncture kit first, Under Direct Fluoroscopy. SURGEON(S):   Zaida Reed M.D., F.A.C.S., F.I.C.S. ASSISTANT(S):  NONE    ANESTHESIA: MAC + Local 1% Xylocaine with Epinephrine, 0.5% Marcaine, mixed, 50% : 50%    SPECIMENS: NONE.    ESTIMATED BLOOD LOSS:  Less then 5 ml           DRAIN: NONE    COMPLICATIONS: NONE           CONDITION / DISPOSITION:  Stable, to PACU     OPERATIVE DESCRIPTION:      After proper informed consent was signed by the  patient with all the risks, benefits, potential complications, and possible  alternatives of the procedure, including but not limited to bleeding,  infection, pneumothorax requiring chest tube placement, port  clotting/clogging requiring port revision with new port placement, amongst  others, the patient was properly identified. In the holding area, she received  2 grams of Ancef IV. TEDs and SCDs were placed on her legs and activated  bilaterally. she voided her urinary bladder prior to OR, and Hibiclens skin  prep was performed of the upper chest and lower neck bilaterally. she was  taken to the operating room and was placed supine on the operating room  table, and after institution of general IV sedation, a shoulder bump was  used. A folded sheet was placed under her scapulae bilaterally. We exposed  the clavicle as anterior as possible.  The patient was placed in  Trendelenburg position. The bilateral upper chest and neck all the way to  the chin were prepped and draped in the usual sterile fashion also using  Ioban drape to prevent any contact of the port with bacterial skin jimmy. A  left SCV approach was started and first path with the micro puncture kit, the left subclavian vein was cannulated without any difficulty. Dark venous blood was aspirated  freely without any difficulty. The guidewire was then passed through the  trocar all the way until it reached the heart. The trocar was then removed  and using the Seldinger technique the guidewire was upgraded to the 0.035 of the Mediport kit; then furthermore local anesthesia was injected for the subcutaneous tunnel  and port site implantation site all the way to the pectoral fascia. A transverse  incision 3 fingerbreadths below the midclavicular entry point of the  guidewire was performed. Dissection was carried down all the way to the  right anterior pectoral fascia. The transverse incision was about 4 cm. Dissection was  carried down to create the pocket and then the port was secured to the  anterior pectoral fascia x3 using 2-0 Prolene. The dilator was then passed  over the guidewire after an 11 blade skin nick incision was performed at the  guidewire entry site at the inferior and midclavicular point. Over the  guidewire, the dilator was advanced without any difficulty, and then the  dilator and sheath were passed over the guidewire, using the Seldinger technique. The guidewire and dilator  were then removed, and the catheter was fed through the sheath all the way until  it reached the right side of the heart. Under direct fluoroscopic visualization, the tip of  the catheter was pulled back gradually until it reached the junction between  the superior vena cava and the right atrium. The tunneler was used to pass  the catheter subcutaneously from the inferior midclavicular entry site to the  port pocket site.  It was divided and connected with the port. The securer was pushed  to cover the line and the port to the hub and then the port was accessed with  a Bryant needle and dark venous blood was aspirated freely without any  difficulty. It was then flushed using 30 mL of normal saline followed by 3  mL of heparinized saline solution 100 units per mL. The deep  dermis/subcutaneous fat was reapproximated using 3-0 Vicryl and the skin of  this incision as well as of the skin nick incision at the mid inferior  clavicular point / access site were reapproximated using 4-0 Vicryl in a  running subcuticular fashion followed by Dermabond skin glue after further  more local anesthetic mixture was injected. A postoperative x-ray revealed adequate  placement of the Mediport without any complications. The patient tolerated  the procedure very well without any complications and was sent to the PACU in  stable condition.      ____________________________________________    Ivy Zhang MD, FACS, FICS    8/3/2021  2:25 PM

## 2021-08-03 NOTE — PROGRESS NOTES
Surgery 8/16/21   you will be called  8/13  with times               1. Do not eat or drink anything after midnight - unless instructed by your doctor prior to surgery. This includes                   no water, chewing gum or mints. 2. Follow your directions as prescribed by the doctor for your procedure and medications. 3. Check with your Doctor regarding stopping Plavix, Coumadin, Lovenox,Effient,Pradaxa,Xarelto, Fragmin or other blood thinners and                   follow their instructions. Pt. To take keppra, atenolol, levothyroxine, protonix, and clonidine if needed for high blood pressure them morning of surgery with a tiny sip of water. 4. Do not smoke, and do not drink any alcoholic beverages 24 hours prior to surgery. This includes NA Beer. 5. You may brush your teeth and gargle the morning of surgery. DO NOT SWALLOW WATER   6. You MUST make arrangements for a responsible adult to take you home after your surgery and be able to check on you every couple                   hours for the day. You will not be allowed to leave alone or drive yourself home. It is strongly suggested someone stay with you the first 24                   hrs. Your surgery will be cancelled if you do not have a ride home. 7. Please wear simple, loose fitting clothing to the hospital.  [place] not bring valuables (money, credit cards, checkbooks, etc.) Do not wear any                   makeup (including no eye makeup) or nail polish on your fingers or toes. 8. DO NOT wear any jewelry or piercings on day of surgery. All body piercing jewelry must be removed. 9. If you have dentures, they will be removed before going to the OR; we will provide you a container. If you wear contact lenses or glasses,                  they will be removed; please bring a case for them. 10. If you  have a Living Will and Durable Power of  for Healthcare, please bring in a copy.            11. Please bring

## 2021-08-03 NOTE — PROGRESS NOTES
1413- pt returned to [facility] rm 24, respirations even and unlabored, pt alert, call light in reach, bed in lowest position  1416-this nurse assisted pt to restroom   1420- crackers and beverage provided to pt, tolerated well  1437- discharge instructions reviewed w/ pt and pt mom via phone call, no questions at this time   1441- pt dressing   1450- CXR at bedside   1500- this nurse called radiology for CXR results   [phone]- CXR resulted, no pnemo, pt discharged via car w/ pt mom driving

## 2021-08-03 NOTE — H&P
HISTORY AND PHYSICAL EXAM    Date of Admission:  (Not on file)    PCP:  Shala Carlos MD, MD    Chief Complaint / History of Present Illness:  Del Navarro is a very pleasant 44 y.o. female who presents today to address her malfunctioning left SCV Mediport; Port not working. . will fix this one place one or place another one in her Right IJ. BEFORE her planned RYGB surgery next week. PMH:   has a past medical history of Acid reflux, Anemia, Anxiety, Arthritis, Bleeding ulcer (), Bronchitis (Last Episode ), Chronic back pain, Chronic pain, COPD (chronic obstructive pulmonary disease) ([address] Depression, Fibromyalgia (Dx ), GERD (gastroesophageal reflux disease), H/O echocardiogram (2015), H/O echocardiogram (2018), Hiatal hernia, History of blood transfusion (2015 And ), History of sleeve gastrectomy, [place] (hard of hearing), cardiac catheterization (10/24/2010), transesophageal echocardiography (PILO) for monitoring (2010), OTHER MEDICAL, OTHER MEDICAL, Hypertension, Kidney cysts, Lupus ([address] (Dx ), MDRO (multiple drug resistant organisms) resistance, Morbid obesity ([address] MRSA bacteremia, Nausea, Osteomyelitis of fifth toe of left foot ([address] Pain management, Pancreatitis chronic (Dx ), Pneumonia (Dx -15), Seizures ([address] Shortness of breath on exertion, Shortness of breath on exertion, Sleep apnea, Staph infection (Dx 11-15), Staph infection (Dx 11-15), Teeth missing, Thyroid disease, and Wears glasses. PSH:   has a past surgical history that includes Lung removal, partial (Left, );  section (1991); Foot surgery (Left, Last Done In ); Dental surgery; Cholecystectomy, laparoscopic (0286'P); other surgical history (1998); other surgical history (Last Done 7-15-16); Tonsillectomy and adenoidectomy (); Appendectomy (1998); Upper gastrointestinal endoscopy (2018); Lap Band (); Hysterectomy (10/1997);  Endoscopy, colon, diagnostic (Last Done In 2018); Colonoscopy (Last Done In 2000's); Cardiac catheterization (10/24/2010); Sleeve Gastrectomy (N/A, 2/12/2019); hiatal hernia repair (N/A, 2/12/2019); Upper gastrointestinal endoscopy (N/A, 4/2/2019); Foot Debridement (Left, 6/16/2019); Upper gastrointestinal endoscopy (N/A, 6/19/2019); Catheter Removal (N/A, 7/16/2019); Upper gastrointestinal endoscopy (N/A, 7/22/2019); INSERTION / REMOVAL / REPLACEMENT VENOUS ACCESS CATHETER (N/A, 8/15/2019); Upper gastrointestinal endoscopy (N/A, 10/14/2019); [address] Surgery (N/A, 1/15/2020); [address] Surgery (N/A, 7/6/2020); Upper gastrointestinal endoscopy (N/A, 7/17/2020); Leg biopsy excision (Left, 3/8/2021); and Upper gastrointestinal endoscopy (N/A, 5/28/2021). Allergies: Allergies   Allergen Reactions    Aspirin Palpitations     \"My Heart Rate Elevates\"    Shellfish-Derived Products Swelling    Toradol [Ketorolac Tromethamine] Rash    Compazine [Prochlorperazine]     Haldol [Haloperidol]      Shaking      Reglan [Metoclopramide] Itching        Home Meds:    Prior to Admission medications    Medication Sig Start Date End Date Taking?  Authorizing Provider   hydrOXYzine (VISTARIL) 50 MG capsule TAKE 1 CAPSULE BY MOUTH EVERY 6 HOURS AS NEEDED FOR ANXIETY 7/28/21   Carlota Raman MD   pantoprazole (PROTONIX) 40 MG tablet Take 1 tablet by mouth daily (with breakfast) 7/16/21   Tasha Albarran PA-C   promethazine (PHENERGAN) 25 MG tablet TAKE 1 TABLET BY MOUTH EVERY 8 HOURS AS NEEDED FOR NAUSEA 7/10/21   Margarita Roberson MD   SM SENNA-S 8.6-50 MG per tablet  7/5/21   Historical Provider, MD   scopolamine (TRANSDERM-SCOP) transdermal patch APPLY 1 PATCH TRANSDERMALLY TO THE SKIN BEHIND THE EAR ONCE EVERY 3 DAYS AS NEEDED 7/4/21   Trimble Bowman Hemmert, PA-C   ondansetron (ZOFRAN ODT) 4 MG disintegrating tablet Take 1 tablet by mouth every 8 hours as needed for Nausea 7/2/21   Segun Briones,    ondansetron (ZOFRAN ODT) 4 MG disintegrating tablet Take 1 tablet by mouth every 8 hours as needed for Nausea 6/17/21   Eliud Aceves DO   cholestyramine light 4 g packet Take 1 packet by mouth 3 times daily  Patient not taking: Reported on 7/30/2021 5/30/21   Ashleigh Steele MD   Melatonin 10 MG TABS Take 10-20 mg by mouth nightly as needed    Historical Provider, MD   Cyanocobalamin (VITAMIN B 12 PO) Take 1 tablet by mouth daily    Historical Provider, MD   meclizine (ANTIVERT) 25 MG tablet Take 25 mg by mouth 3 times daily as needed for Dizziness    Historical Provider, MD   dicyclomine (BENTYL) 10 MG capsule Take 1 capsule by mouth 3 times daily as needed (abdominal pain) 5/13/21   Yue Brown MD   levETIRAcetam (KEPPRA) 1000 MG tablet Take 1 tablet by mouth 2 times daily 5/13/21   Yue Brown MD   levothyroxine (SYNTHROID) 125 MCG tablet Take 1 tablet by mouth Daily 5/13/21   Yue Brown MD   atenolol (TENORMIN) 25 MG tablet Take 1 tablet by mouth daily 5/13/21   Yue Brown MD   cloNIDine (CATAPRES) 0.1 MG tablet Take 1 tablet by mouth 2 times daily  Patient taking differently: Take 0.1 mg by mouth 2 times daily as needed for High Blood Pressure 05/27/21 Patient states she only takes as needed usually if SBP>150 5/13/21   Yue Brown MD   estradiol (ESTRACE) 0.5 MG tablet Take 1 tablet by mouth daily  Patient taking differently: Take 0.5 mg by mouth nightly  5/13/21   Yue Brown MD   PARoxetine (PAXIL) 30 MG tablet Take 1.5 tablets by mouth nightly  Patient taking differently: Take 30 mg by mouth nightly  5/13/21   Yue Brown MD   albuterol (PROVENTIL) (2.5 MG/3ML) 0.083% nebulizer solution Take 3 mLs by nebulization every 6 hours as needed for Wheezing  Patient not taking: Reported on 7/30/2021 4/7/21   MICHAEL Martins - CNP   betamethasone valerate (VALISONE) 0.1 % ointment APPLY OINTMENT TO AFFECTED AREA TWICE DAILY TO HANDS AND ELBOWS FOR 21 DAYS 3/26/21   Historical Provider, MD   potassium gluconate 550 mg tablet Take 1 tablet by mouth daily 3/25/21   Historical Provider, MD   tiZANidine (ZANAFLEX) 4 MG tablet Take 4 mg by mouth every 8 hours as needed  3/3/21   Historical Provider, MD   albuterol sulfate  (90 Base) MCG/ACT inhaler Inhale 2 puffs into the lungs 4 times daily  Patient not taking: Reported on 7/30/2021 2/8/21   Pk Olivia MD   ferrous sulfate (IRON 325) 325 (65 Fe) MG tablet Take 1 tablet by mouth daily (with breakfast) 10/23/20   Anjel Sosa MD   oxyCODONE-acetaminophen (PERCOCET) 5-325 MG per tablet Take 1 tablet by mouth. Every 4-6 hours PRN    Historical Provider, MD   Cholecalciferol (VITAMIN D3) 5000 units TABS Take 1 tablet by mouth daily    Historical Provider, MD   Multiple Vitamin (MVI, BARIATRIC ADVANTAGE MULTI-FORMULA, CHEW TAB) Take 1 tablet by mouth daily 2/22/19   Anjel Sosa MD   Calcium Citrate-Vitamin D (CALCIUM + VIT D, BARIATRIC ADVANTAGE, CHEWABLE TABLET) Take 1 tablet by mouth 2 times daily 2/22/19   Anjel Sosa MD   gabapentin (NEURONTIN) 800 MG tablet Take 800 mg by mouth 3 times daily    Historical Provider, MD        Hospital Meds:    No current facility-administered medications for this encounter.      Current Outpatient Medications   Medication Sig Dispense Refill    hydrOXYzine (VISTARIL) 50 MG capsule TAKE 1 CAPSULE BY MOUTH EVERY 6 HOURS AS NEEDED FOR ANXIETY 60 capsule 0    pantoprazole (PROTONIX) 40 MG tablet Take 1 tablet by mouth daily (with breakfast) 30 tablet 0    promethazine (PHENERGAN) 25 MG tablet TAKE 1 TABLET BY MOUTH EVERY 8 HOURS AS NEEDED FOR NAUSEA 30 tablet 0    SM SENNA-S 8.6-50 MG per tablet       scopolamine (TRANSDERM-SCOP) transdermal patch APPLY 1 PATCH TRANSDERMALLY TO THE SKIN BEHIND THE EAR ONCE EVERY 3 DAYS AS NEEDED 10 patch 0    ondansetron (ZOFRAN ODT) 4 MG disintegrating tablet Take 1 tablet by mouth every 8 hours as needed for Nausea 15 tablet 0    ondansetron (ZOFRAN ODT) 4 MG disintegrating tablet Take 1 tablet by mouth every 8 hours as needed for Nausea 15 tablet 0    cholestyramine light 4 g packet Take 1 packet by mouth 3 times daily (Patient not taking: Reported on 7/30/2021) 60 packet 3    Melatonin 10 MG TABS Take 10-20 mg by mouth nightly as needed      Cyanocobalamin (VITAMIN B 12 PO) Take 1 tablet by mouth daily      meclizine (ANTIVERT) 25 MG tablet Take 25 mg by mouth 3 times daily as needed for Dizziness      dicyclomine (BENTYL) 10 MG capsule Take 1 capsule by mouth 3 times daily as needed (abdominal pain) 21 capsule 3    levETIRAcetam (KEPPRA) 1000 MG tablet Take 1 tablet by mouth 2 times daily 60 tablet 3    levothyroxine (SYNTHROID) 125 MCG tablet Take 1 tablet by mouth Daily 30 tablet 3    atenolol (TENORMIN) 25 MG tablet Take 1 tablet by mouth daily 30 tablet 3    cloNIDine (CATAPRES) 0.1 MG tablet Take 1 tablet by mouth 2 times daily (Patient taking differently: Take 0.1 mg by mouth 2 times daily as needed for High Blood Pressure 05/27/21 Patient states she only takes as needed usually if SBP>150) 60 tablet 3    estradiol (ESTRACE) 0.5 MG tablet Take 1 tablet by mouth daily (Patient taking differently: Take 0.5 mg by mouth nightly ) 21 tablet 3    PARoxetine (PAXIL) 30 MG tablet Take 1.5 tablets by mouth nightly (Patient taking differently: Take 30 mg by mouth nightly ) 30 tablet 3    albuterol (PROVENTIL) (2.5 MG/3ML) 0.083% nebulizer solution Take 3 mLs by nebulization every 6 hours as needed for Wheezing (Patient not taking: Reported on 7/30/2021) 120 each 0    betamethasone valerate (VALISONE) 0.1 % ointment APPLY OINTMENT TO AFFECTED AREA TWICE DAILY TO HANDS AND ELBOWS FOR 21 DAYS      potassium gluconate 550 mg tablet Take 1 tablet by mouth daily      tiZANidine (ZANAFLEX) 4 MG tablet Take 4 mg by mouth every 8 hours as needed       albuterol sulfate  (90 Base) MCG/ACT inhaler Inhale 2 puffs into the lungs 4 times daily (Patient not taking: Reported on 7/30/2021) 1 Inhaler 5    ferrous sulfate (IRON 325) 325 (65 Fe) MG tablet Take 1 tablet by mouth daily (with breakfast) 90 tablet 5    oxyCODONE-acetaminophen (PERCOCET) 5-325 MG per tablet Take 1 tablet by mouth. Every 4-6 hours PRN      Cholecalciferol (VITAMIN D3) 5000 units TABS Take 1 tablet by mouth daily      Multiple Vitamin (MVI, BARIATRIC ADVANTAGE MULTI-FORMULA, CHEW TAB) Take 1 tablet by mouth daily 30 tablet 5    Calcium Citrate-Vitamin D (CALCIUM + VIT D, BARIATRIC ADVANTAGE, CHEWABLE TABLET) Take 1 tablet by mouth 2 times daily 120 tablet 5    gabapentin (NEURONTIN) 800 MG tablet Take 800 mg by mouth 3 times daily         Social History / Family History:        Social History     Socioeconomic History    Marital status:      Spouse name: Not on file    Number of children: Not on file    Years of education: Not on file    Highest education level: Not on file   Occupational History    Not on file   Tobacco Use    Smoking status: Never Smoker    Smokeless tobacco: Never Used   Vaping Use    Vaping Use: Never used   Substance and Sexual Activity    Alcohol use: No     Alcohol/week: 0.0 standard drinks    Drug use: No    Sexual activity: Not Currently   Other Topics Concern    Not on file   Social History Narrative    Not on file     Social Determinants of Health     Financial Resource Strain:     Difficulty of Paying Living Expenses:    Food Insecurity:     Worried About Running Out of Food in the Last Year:     Ran Out of Food in the Last Year:    Transportation Needs:     Lack of Transportation (Medical):      Lack of Transportation (Non-Medical):    Physical Activity:     Days of Exercise per Week:     Minutes of Exercise per Session:    Stress:     Feeling of Stress :    Social Connections:     Frequency of Communication with Friends and Family:     Frequency of Social Gatherings with Friends and Family:     Attends Taoism Services:     Active Member of [employer] or Organizations:     Attends Club or Organization Meetings:     Marital Status:    Intimate Partner Violence:     Fear of Current or Ex-Partner:     Emotionally Abused:     Physically Abused:     Sexually Abused:       Family History   Problem Relation Age of Onset    Diabetes Mother         \"Borderline Diabetes\"    High Blood Pressure Mother     Obesity Mother     Arthritis Mother     Heart Disease Mother     High Cholesterol Mother     Vision Loss Mother     Diabetes Father     High Blood Pressure Father     Asthma Father     Cancer Father         prostate cancer    High Blood Pressure Brother     Asthma Son     Vision Loss Son     Lupus Daughter     Other Daughter         \"Alot Of Female Problems\"    otherwise irrelevant to this surgical issue. Review of Systems:  Constitutional: Positive for fatigue. Negative for activity change, chills, diaphoresis and fever. HENT: Negative for sore throat, trouble swallowing and voice change. Eyes: Negative for photophobia and visual disturbance. Respiratory: Positive for shortness of breath. Negative for cough and wheezing. Cardiovascular: Positive for leg swelling. Negative for chest pain and palpitations. Gastrointestinal: Positive for abdominal distention and abdominal pain. Negative for anal bleeding, blood in stool, constipation, diarrhea, nausea and vomiting. Endocrine: Positive for polyphagia. Negative for cold intolerance, heat intolerance, polydipsia and polyuria. Genitourinary: Positive for urgency. Negative for dysuria, frequency and hematuria. Musculoskeletal: Positive for arthralgias, back pain and gait problem. Negative for joint swelling, myalgias and neck stiffness. Skin: Negative for color change and rash. Neurological: Negative for seizures, speech difficulty, light-headedness and numbness. Hematological: Negative for adenopathy. Does not bruise/bleed easily.    Psychiatric/Behavioral: Positive for sleep disturbance. The patient is nervous/anxious. Physical Exam:  Vital Signs: There were no vitals filed for this visit. There is no height or weight on file to calculate BMI. General appearance: Pt Alert and oriented, to persons place and time, in no apparent acute distress. HEENT:  KATE, EOMI, Conjunctivae clear. No JVDs, Bruits, No thyroid-megaly. Lungs: No dyspnea. Clear to auscultation bilaterally. Heart: Regular rate and rhythm, S1, S2 normal, no murmur, rub or gallop. No legs edema. Abdomen: Non tender. Non distended. Positive bowel sounds. No hernias noted, no masses palpable. Extremities: Warm, well perfused, no cyanosis or edema. Skin: Skin color, texture, turgor normal.  Neurologic: Grossly normal, Cranial nerves from II to XII intact. Deep tendon reflexes normal.  Psychology: Mood stable. Lymph nodes: No palpable LN in the neck, abdomen, or groins. Radiologic / Imaging / TESTING  IR CONTRAST INJECTION  EXISTING CV ACCESS DEVICE EVALUATION W FLUOROSC    Result Date: 7/30/2021  PROCEDURE: IR CONTRAST INJECTION  EXISTING CV ACCESS DEVICE EVALUATION W FLUOROSC 7/29/2021 HISTORY: ORDERING SYSTEM PROVIDED HISTORY: Poor intravenous access TECHNOLOGIST PROVIDED HISTORY: Reason for exam:->Malfunctioning Port, Unable to flush Is the patient pregnant?->No CONTRAST: 3 cc of Isovue 370 contrast was utilized. SEDATION: None FLUOROSCOPY DOSE AND TYPE OR TIME AND EXPOSURES: 0.2 minutes of fluoroscopic time was utilized. AK 20 mGy. DESCRIPTION OF PROCEDURE: Informed consent was obtained after a detailed explanation of the procedure including risks, benefits, and alternatives. Universal protocol was observed. The patient was placed in supine position on the angiographic table. The area overlying the port was prepped and draped in usual sterile fashion. A Bryant needle was utilized to access the port reservoir. A  film demonstrates that the catheter tip is within the superior vena cava. There is a loop within the subcutaneously tunneled portion of the catheter. There is mild pinch off of the catheter in the region of the costoclavicular ligament. There is marked obstruction to flow during the injection of contrast within the catheter. There is a small fibrin sheath noted at the catheter tip. There is no evidence of extravasation. The procedure was then terminated. Patient tolerated the entire procedure well without immediate complications. Tod Mercer FINDINGS: Small fibrin sheath at the tip of the catheter which is within the superior vena cava. This would not explain the obstruction to injection however. Loop within the subcutaneously tunneled portion of the catheter. Mild pinch off of the catheter in the region of the costoclavicular ligament. Marked obstruction of flow during the injection of contrast within the catheter although no intrinsic obstruction within the catheter is identified. Fibrin sheath at the tip of the catheter which is within the superior vena cava. Loop within the subcutaneously tunneled portion of the catheter. Mild pinch off of the catheter in the region of the costoclavicular ligament. Marked obstruction of flow during the injection of contrast within the catheter although no intrinsic obstruction within the catheter is identified. The obstruction to injection of the port would not be caused by the fibrin sheath. Findings were discussed with Dr. Ingrid Bruno.  It is noted that the patient has a history of numerous prior chest port insertions. She may benefit from a right internal jugular vein access for a future port insertion. VL DUP LOWER EXTREMITY VENOUS RIGHT    Result Date: 7/16/2021  EXAMINATION: DUPLEX VENOUS ULTRASOUND OF THE RIGHT LOWER EXTREMITY, 7/16/2021 1:02 pm TECHNIQUE: Duplex ultrasound using B-mode/gray scaled imaging and Doppler spectral analysis and color flow was obtained of the right lower extremity. COMPARISON: None.  HISTORY: ORDERING SYSTEM PROVIDED HISTORY: Morbid obesity with BMI of 45.0-49.9, adult [facility]) TECHNOLOGIST PROVIDED HISTORY: R/o DVT please Reason for exam:->r/o dvt Reason for Exam: right leg pain Type of Exam: Initial FINDINGS: The visualized veins of the right lower extremity are patent and free of echogenic thrombus. The veins demonstrate good compressibility with normal color flow study and spectral analysis. No evidence of DVT in the right lower extremity. Labs:    No results found for this or any previous visit (from the past 24 hour(s)). Diagnosis:  Patient Active Problem List   Diagnosis    Fibromyalgia    Lupus (systemic lupus erythematosus) (HCC)    Chronic pancreatitis (HCC)    HTN (hypertension)    Frequent UTI    Gastroesophageal reflux disease without esophagitis    Chronic depression    Fatty liver disease, nonalcoholic    Arthritis    Bilateral low back pain with left-sided sciatica    Hiatal hernia    Status post laparoscopic sleeve gastrectomy    Pseudoseizure    Chronic pain syndrome    Drug-seeking/Aberrant behavior    Lymph node disorder    Morbid obesity with BMI of 40.0-44.9, adult ([facility])    Iron deficiency anemia    Seizure disorder ([facility])    Anxiety    RUQ pain    Status post bariatric surgery    Morbid obesity with BMI of 45.0-49.9, adult ([address]    Discoloration of skin of flank resembling ecchymosis    Morbid obesity with BMI of 50.0-59.9, adult ([address]    Chest pain of uncertain etiology    Cellulitis of left thigh    Malabsorption    COPD (chronic obstructive pulmonary disease) ([address]    Hypothyroidism due to acquired atrophy of thyroid    Osteopenia    Vitamin D deficiency    Irritable bowel syndrome    Malabsorption due to intolerance, not elsewhere classified    Port-A-Cath in place           Assessment & Plan:     Renata Zurita is a very pleasant 44 y.o. female who presents today to address her malfunctioning left SCV Mediport; Port not working. . will fix

## 2021-08-09 ENCOUNTER — HOSPITAL ENCOUNTER (EMERGENCY)
Age: 51
Discharge: LEFT BEFORE DISCHARGE | End: 2021-08-09
Payer: COMMERCIAL

## 2021-08-09 VITALS
TEMPERATURE: 98.2 F | WEIGHT: 270 LBS | SYSTOLIC BLOOD PRESSURE: 141 MMHG | OXYGEN SATURATION: 98 % | HEIGHT: 64 IN | HEART RATE: 105 BPM | DIASTOLIC BLOOD PRESSURE: 126 MMHG | BODY MASS INDEX: 46.1 KG/M2 | RESPIRATION RATE: 24 BRPM

## 2021-08-09 DIAGNOSIS — Z01.818 PRE-OP TESTING: Primary | ICD-10-CM

## 2021-08-09 ASSESSMENT — PAIN SCALES - GENERAL: PAINLEVEL_OUTOF10: 7

## 2021-08-10 ENCOUNTER — NURSE ONLY (OUTPATIENT)
Dept: SURGERY | Age: 51
End: 2021-08-10
Payer: COMMERCIAL

## 2021-08-10 ENCOUNTER — HOSPITAL ENCOUNTER (OUTPATIENT)
Age: 51
Setting detail: SPECIMEN
Discharge: HOME | End: 2021-08-10
Payer: COMMERCIAL

## 2021-08-10 DIAGNOSIS — Z01.818 PRE-OP TESTING: Primary | ICD-10-CM

## 2021-08-10 LAB
SARS-COV-2: NOT DETECTED
SOURCE: NORMAL

## 2021-08-10 PROCEDURE — U0005 INFEC AGEN DETEC AMPLI PROBE: HCPCS

## 2021-08-10 PROCEDURE — U0003 INFECTIOUS AGENT DETECTION BY NUCLEIC ACID (DNA OR RNA); SEVERE ACUTE RESPIRATORY SYNDROME CORONAVIRUS 2 (SARS-COV-2) (CORONAVIRUS DISEASE [COVID-19]), AMPLIFIED PROBE TECHNIQUE, MAKING USE OF HIGH THROUGHPUT TECHNOLOGIES AS DESCRIBED BY CMS-2020-01-R: HCPCS

## 2021-08-10 PROCEDURE — 99211 OFF/OP EST MAY X REQ PHY/QHP: CPT | Performed by: SURGERY

## 2021-08-10 NOTE — ED NOTES
Pt states she needs to go lay down, departed ED at this time.       Alem Shin, JACKIE  08/09/21 [id number]

## 2021-08-10 NOTE — PATIENT INSTRUCTIONS
Pre-Procedure COVID-19 Self Testing  Quarantine Instructions  Day of Surgery Instructions         What to do before my surgery:    All patients scheduled for elective surgery must test for COVID19 72-96 hours prior to the surgery date.  Pre-Procedure COVID-19 Self-Test will be scheduled for you by your provider.  You can receive your Pre-Procedure COVID-19 Self-Test at:  [facility]. [address], [facility], [facility], [address] [facility],[address]   If you do not have the COVID-19 test we will cancel or reschedule your procedure   Once you test you must quarantine at home until after your procedure with only your immediate family members or whoever lives with you.  If you must work during your quarantine period, we ask that you continue to practice social distancing, wear a mask that covers your mouth and nose and perform all hand hygiene as recommended by the CDC.  If you must go to the grocery, etc. and cannot get someone to do this for you please wear a mask that covers your mouth and nose and perform all hand hygiene as recommended by the CDC.  Your surgeon's office will notify you with any concerns about your test result. What can I expect on the day of surgery?  Arrive at the time the office or hospital staff tell you on the day of your procedure.  Wear a mask when entering the hospital.     A member of the hospital staff will take your temperature and ask you a few questions as you enter the building.  In abundance of caution for the safety of all our patients and staff, please follow all hospital visitor guidelines in place at the time of your procedure. The staff caring for you will stay in close communication with your loved one and keep them updated on progress.  Please provide a phone number for us to use when communicating with your family or ride home.    When you are ready to discharge, we will notify your family/person with you to bring the car to the front entrance. We will take you to them after you receive all of your discharge instructions.
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
## 2021-08-12 ENCOUNTER — OFFICE VISIT (OUTPATIENT)
Dept: BARIATRICS/WEIGHT MGMT | Age: 51
End: 2021-08-12

## 2021-08-12 VITALS — WEIGHT: 270.8 LBS | BODY MASS INDEX: 46.23 KG/M2 | HEIGHT: 64 IN

## 2021-08-12 DIAGNOSIS — E66.01 MORBID OBESITY WITH BMI OF 45.0-49.9, ADULT (HCC): Primary | ICD-10-CM

## 2021-08-12 PROCEDURE — 99999 PR OFFICE/OUTPT VISIT,PROCEDURE ONLY: CPT

## 2021-08-12 NOTE — PROGRESS NOTES
Outpatient Nutrition Counseling    REASON FOR VISIT:Weigh-In    Chief Complaint:    Chief Complaint   Patient presents with    Weight Management       SUBJECTIVE:  Pt here for weigh-in visit. Pt reports pharmacist helped her get protein shakes at the store. Was given Ensure plus with 350 kcals each. Was using 3x/day. Pt lost 1.9 lbs. Still has 4 days prior to surgery. Instructed pt again on calorie/protein requirements for acceptable shakes - less than 200 kcals, more than 15 gms protein. Pt will purchase different shakes and use up until surgery on Monday. The patient is a 44 y.o. female being seen for morbid obesity, considering revision weight loss surgery; Robbin's, Height: 5' 4.25\" (163.2 cm), Weight: 270 lb 12.8 oz (122.8 kg), Current Body mass index is 46.12 kg/m². The patient's PCP is Spenser Johnson MD, MD     Comorbid Conditions:  Significant diseases affecting this patient are   Past Medical History:   Diagnosis Date    Acid reflux     Anemia     Anxiety     Arthritis     Hands, Back And Ankles    Bleeding ulcer 2014    \"I Had Ulcers In My Stomach And Colon\"/ per pt on 8/12/2019\"they said recently having some blood in my stomach- in July ( 2019)could not find where coming from \"    Bronchitis Last Episode 2014    Chronic back pain     [facility]     [facility]    COPD (chronic obstructive pulmonary disease) ([facility]     Sees Dr. [place] Underwood Depression     Fibromyalgia Dx 2013    GERD (gastroesophageal reflux disease)     H/O echocardiogram 08/11/2015    EF >55%. LA to be at the upper limit of normal in size. LV hypertrophy with normal LV systolic, but abnormal diastolic function. Normal valvular structures and function.      H/O echocardiogram 08/30/2018    EF 55-60%    Hiatal hernia     History of blood transfusion 09/2015 And 2018    No Reaction To Blood Transfusions Received    History of sleeve gastrectomy     [place] (hard of hearing)     Right Ear    Hx of cardiac catheterization 10/24/2010    Angiographically normal coronary arteries w/ normal LV function and wall motion.  Hx of transesophageal echocardiography (PILO) for monitoring 12/02/2010    EF 55-60%. WNL. 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- consult with Dr Orlando Sanderson     \"very difficulty IV stick- had mediport infection in the past- then put picc line in and removed 8/7/2019 now going to put new mediport in\"( 8/15/2019)    Hypertension     follow with Dr ? name   Mara Levo cysts     \"they found that I have a kidney cyst but not sure which side\" per pt on 7/15/2020    Lupus ([address] Dx 2013    \"Rheumatoid Lupus\"    MDRO (multiple drug resistant organisms) resistance     4 months ago chest from mediport    Morbid obesity ([address]     MRSA bacteremia     Nausea     \"Most Of The Time\"    Osteomyelitis of fifth toe of left foot ([address]     Pain management     Sees Dr. Lizabeth Severs, Once A Month    Pancreatitis chronic Dx 2001    Pneumonia Dx 11-15    Seizures ([address]     Shortness of breath on exertion     Shortness of breath on exertion     Sleep apnea     Has CPAP\"no longer use the cpap since lost weight\"    Staph infection Dx 11-15    Left Foot    Staph infection Dx 11-15    \"Left Foot\"    Teeth missing     Upper And Lower    Thyroid disease     Wears glasses     To Read   . Review of Systems - Review of Systems  Otherwise per HPI. Allergies:   Allergies   Allergen Reactions    Aspirin Palpitations     \"My Heart Rate Elevates\"    Shellfish-Derived Products Swelling    Toradol [Ketorolac Tromethamine] Rash    Compazine [Prochlorperazine]     Haldol [Haloperidol]      Shaking      Reglan [Metoclopramide] Itching       Past Surgical History:  Past Surgical History:   Procedure Laterality Date    APPENDECTOMY  02/1998    Done When Tubes And Ovaries Were Removed    CARDIAC CATHETERIZATION  10/24/2010    CATHETER REMOVAL N/A 7/16/2019    PORT REMOVAL performed by [facility]ar [facility] at [facility]  08/27/1991    CHOLECYSTECTOMY, LAPAROSCOPIC  1990's    COLONOSCOPY  Last Done In 2000's   [facility] DENTAL SURGERY      Teeth Extracted In Past    ENDOSCOPY, COLON, DIAGNOSTIC  Last Done In 2018    FOOT DEBRIDEMENT Left 6/16/2019    FIRST METATARSAL DEBRIDEMENT INCISION AND DRAINAGE. EXCISION OF ULCER.  RESECTION OF BONE. 1ST METATARSAL POWER LAVAGE AND BONE CEMENT performed by Nkechi Rayo DPM at [place] [address]. Left Last Done In 2016     Dr. Miki Park, \" About 6 Surgeries Done Because Of Staph Infection\"    HIATAL HERNIA REPAIR N/A 2/12/2019    HERNIA HIATAL LAPAROSCOPIC ROBOTIC performed by Lamar Chavez MD at [facility]  10/1997    Partial Abdominal Hysterectomy    INSERTION / REMOVAL / REPLACEMENT VENOUS ACCESS CATHETER N/A 8/15/2019    PORT INSERTION performed by Lamar Chavez MD at [facility] [place]    removed after 6 months    LEG BIOPSY EXCISION Left 3/8/2021    LEFT THIGH LESION BIOPSY EXCISION performed by Lamar Chavez MD at [facility], PARTIAL Left 2008    Benign    OTHER SURGICAL HISTORY  02/1998    \"Tubes And Ovaries Removed, Appendectomy Also Done\"    OTHER SURGICAL HISTORY  Last Done 7-15-16    Mediport Insertion \"Total Of Six Done, Removed Last Mediport In 2014\"    PORT SURGERY N/A 1/15/2020    PORT REMOVAL performed by Lamar Chavez MD at [place] [address]. N/A 7/6/2020    PORT INSERTION performed by Lamar Chavez MD at [place] [address]. Left 8/3/2021    MEDIPORT REPLACEMENT performed by Lamar Chavez MD at [address] [place] [address] N/A 2/12/2019    GASTRECTOMY SLEEVE LAPAROSCOPIC ROBOTIC performed by Lamar Chavez MD at [address] [place] [address]  08/27/2018    UPPER GASTROINTESTINAL ENDOSCOPY N/A 4/2/2019    EGD CONTROL HEMORRHAGE with epi injection at bleeding site performed by James Modi MD at [place] [address] 6/19/2019    EGD DIAGNOSTIC ONLY performed by James Modi MD at [facility] N/A 7/22/2019    EGD DIAGNOSTIC ONLY performed by Karlo Macdonald MD at [facility]/A 10/14/2019    EGD DIAGNOSTIC ONLY performed by James Modi MD at [facility] N/A 7/17/2020    EGJ DILATATION BALLOON WITH 18-20 MM BALLOON, DILATED TO 20 MM. performed by James Modi MD at [facility] N/A 5/28/2021    EGD BIOPSY performed by James Modi MD at [facility] ENDOSCOPY       Family History:  Family History   Problem Relation Age of Onset    Diabetes Mother         \"Borderline Diabetes\"    High Blood Pressure Mother     Obesity Mother     Arthritis Mother     Heart Disease Mother     High Cholesterol Mother     Vision Loss Mother     Diabetes Father     High Blood Pressure Father     Asthma Father     Cancer Father         prostate cancer    High Blood Pressure Brother     Asthma Son     Vision Loss Son     Lupus Daughter     Other Daughter         \"Alot Of Female Problems\"       Social History:  Social History     Socioeconomic History    Marital status:      Spouse name: Not on file    Number of children: Not on file    Years of education: Not on file    Highest education level: Not on file   Occupational History    Not on file   Tobacco Use    Smoking status: Never Smoker    Smokeless tobacco: Never Used   Vaping Use    Vaping Use: Never used   Substance and Sexual Activity    Alcohol use: No     Alcohol/week: 0.0 standard drinks    Drug use: No    Sexual activity: Not Currently   Other Topics Concern    Not on file   Social History Narrative    Not on file     Social Determinants of Health     Financial Resource Strain:     Difficulty of Paying Living Expenses:    Food Insecurity:     Worried About Running Out of Food in the Last Year:     [address] in the Last Year:    Transportation Needs:     Lack of Transportation (Medical):  Lack of Transportation (Non-Medical):    Physical Activity:     Days of Exercise per Week:     Minutes of Exercise per Session:    Stress:     Feeling of Stress :    Social Connections:     Frequency of Communication with Friends and Family:     Frequency of Social Gatherings with Friends and Family:     Attends Hoahaoism Services:     Active Member of Clubs or Organizations:     Attends Club or Organization Meetings:     Marital Status:    Intimate Partner Violence:     Fear of Current or Ex-Partner:     Emotionally Abused:     Physically Abused:     Sexually Abused:          OBJECTIVE:  Physical Exam   Ht 5' 4.25\" (1.632 m)   Wt 270 lb 12.8 oz (122.8 kg)   BMI 46.12 kg/m²        NUTRITION DIAGNOSIS: Overweight / Obesity   Problem: Increased adiposity compared to reference standard or established norms   Etiology: Excess intake compared to output over time   S/S: Ht: 64.25\" Wt: 270.8 lbs BMI: 46.12    NUTRITION INTERVENTIONS:    Individualized treatment goals to address nutritiondiagnosis:   Instructed on 600-800 kcal diet for weight loss post-op   Provided correct protein shake info   Encouraged Physical activity as approved by physician    MONITORING/ EVALUATION/ PLAN:   Pt verbalized understanding of allmaterials covered   Pt asked pertinent questions throughout the session - expect compliance with nutrition guidelines presented   Provided pt with contact information should questions arise prior to next visit   Will f/u with pt post-op  GABRIEL Gale MS, RDN, LD  8/12/2021

## 2021-08-13 ENCOUNTER — ANESTHESIA EVENT (OUTPATIENT)
Dept: OPERATING ROOM | Age: 51
DRG: 620 | End: 2021-08-13
Payer: COMMERCIAL

## 2021-08-13 NOTE — ANESTHESIA PRE PROCEDURE
Department of Anesthesiology  Preprocedure Note       Name:  Noris Bagley   Age:  44 y.o.  :  1970                                          MRN:  [id number]         Date:  2021      Surgeon: Bryce Mohan):  Maria Del Rosario Aguilar MD    Procedure: Procedure(s):  GASTRIC BYPASS RUFINO-EN-Y LAPAROSCOPIC ROBOTIC    Medications prior to admission:   Prior to Admission medications    Medication Sig Start Date End Date Taking?  Authorizing Provider   senna-docusate (SENOKOT S) 8.6-50 MG per tablet Take 2 tablets by mouth daily for 10 days 8/3/21 8/13/21  Maria Del Rosario Aguilar MD   hydrOXYzine (VISTARIL) 50 MG capsule TAKE 1 CAPSULE BY MOUTH EVERY 6 HOURS AS NEEDED FOR ANXIETY 21   Kalie Rangel MD   pantoprazole (PROTONIX) 40 MG tablet Take 1 tablet by mouth daily (with breakfast) 21   Jeramie Albarran PA-C   promethazine (PHENERGAN) 25 MG tablet TAKE 1 TABLET BY MOUTH EVERY 8 HOURS AS NEEDED FOR NAUSEA 7/10/21   Maria Del Rosario Aguilar MD   SM SENNA-S 8.6-50 MG per tablet  21   Historical Provider, MD   scopolamine (TRANSDERM-SCOP) transdermal patch APPLY 1 PATCH TRANSDERMALLY TO THE SKIN BEHIND THE EAR ONCE EVERY 3 DAYS AS NEEDED 21   Nicole Kiser PA-C   ondansetron (ZOFRAN ODT) 4 MG disintegrating tablet Take 1 tablet by mouth every 8 hours as needed for Nausea 21   Jihan Yanes DO   ondansetron (ZOFRAN ODT) 4 MG disintegrating tablet Take 1 tablet by mouth every 8 hours as needed for Nausea 21   Jihan Yanes DO   cholestyramine light 4 g packet Take 1 packet by mouth 3 times daily  Patient not taking: Reported on 2021   Andrew Corcoran MD   Melatonin 10 MG TABS Take 10-20 mg by mouth nightly as needed    Historical Provider, MD   Cyanocobalamin (VITAMIN B 12 PO) Take 1 tablet by mouth daily    Historical Provider, MD   meclizine (ANTIVERT) 25 MG tablet Take 25 mg by mouth 3 times daily as needed for Dizziness    Historical Provider, MD   dicyclomine (BENTYL) 10 MG capsule Take 1 capsule by mouth 3 times daily as needed (abdominal pain) 5/13/21   Spenser Johnson MD   levETIRAcetam (KEPPRA) 1000 MG tablet Take 1 tablet by mouth 2 times daily 5/13/21   Spenser Johnson MD   levothyroxine (SYNTHROID) 125 MCG tablet Take 1 tablet by mouth Daily 5/13/21   Spenser Johnson MD   atenolol (TENORMIN) 25 MG tablet Take 1 tablet by mouth daily 5/13/21   Spenser Johnson MD   cloNIDine (CATAPRES) 0.1 MG tablet Take 1 tablet by mouth 2 times daily  Patient taking differently: Take 0.1 mg by mouth 2 times daily as needed for High Blood Pressure 05/27/21 Patient states she only takes as needed usually if SBP>150 5/13/21   Spenser Johnson MD   estradiol (ESTRACE) 0.5 MG tablet Take 1 tablet by mouth daily  Patient taking differently: Take 0.5 mg by mouth nightly  5/13/21   Spenser Johnson MD   PARoxetine (PAXIL) 30 MG tablet Take 1.5 tablets by mouth nightly  Patient taking differently: Take 30 mg by mouth nightly  5/13/21   Spenser Johnson MD   albuterol (PROVENTIL) (2.5 MG/3ML) 0.083% nebulizer solution Take 3 mLs by nebulization every 6 hours as needed for Wheezing 4/7/21   MICHAEL Palma - CNP   betamethasone valerate (VALISONE) 0.1 % ointment APPLY OINTMENT TO AFFECTED AREA TWICE DAILY TO HANDS AND ELBOWS FOR 21 DAYS 3/26/21   Historical Provider, MD   potassium gluconate 550 mg tablet Take 1 tablet by mouth daily 3/25/21   Historical Provider, MD   tiZANidine (ZANAFLEX) 4 MG tablet Take 4 mg by mouth every 8 hours as needed  3/3/21   Historical Provider, MD   albuterol sulfate  (90 Base) MCG/ACT inhaler Inhale 2 puffs into the lungs 4 times daily 2/8/21   Mandeep Green MD   ferrous sulfate (IRON 325) 325 (65 Fe) MG tablet Take 1 tablet by mouth daily (with breakfast) 10/23/20   Truong Roberts MD   Cholecalciferol (VITAMIN D3) 5000 units TABS Take 1 tablet by mouth daily    Historical Provider, MD   Multiple Vitamin (MVI, BARIATRIC ADVANTAGE MULTI-FORMULA, CHEW TAB) Take 1 tablet by mouth daily 2/22/19   Scar Acevedo MD   Calcium Citrate-Vitamin D (CALCIUM + VIT D, BARIATRIC ADVANTAGE, CHEWABLE TABLET) Take 1 tablet by mouth 2 times daily 2/22/19   Scar Acevedo MD   gabapentin (NEURONTIN) 800 MG tablet Take 800 mg by mouth 3 times daily    Historical Provider, MD       Current medications:    No current facility-administered medications for this encounter.      Current Outpatient Medications   Medication Sig Dispense Refill    senna-docusate (SENOKOT S) 8.6-50 MG per tablet Take 2 tablets by mouth daily for 10 days 60 tablet 3    hydrOXYzine (VISTARIL) 50 MG capsule TAKE 1 CAPSULE BY MOUTH EVERY 6 HOURS AS NEEDED FOR ANXIETY 60 capsule 0    pantoprazole (PROTONIX) 40 MG tablet Take 1 tablet by mouth daily (with breakfast) 30 tablet 0    promethazine (PHENERGAN) 25 MG tablet TAKE 1 TABLET BY MOUTH EVERY 8 HOURS AS NEEDED FOR NAUSEA 30 tablet 0    SM SENNA-S 8.6-50 MG per tablet       scopolamine (TRANSDERM-SCOP) transdermal patch APPLY 1 PATCH TRANSDERMALLY TO THE SKIN BEHIND THE EAR ONCE EVERY 3 DAYS AS NEEDED 10 patch 0    ondansetron (ZOFRAN ODT) 4 MG disintegrating tablet Take 1 tablet by mouth every 8 hours as needed for Nausea 15 tablet 0    ondansetron (ZOFRAN ODT) 4 MG disintegrating tablet Take 1 tablet by mouth every 8 hours as needed for Nausea 15 tablet 0    cholestyramine light 4 g packet Take 1 packet by mouth 3 times daily (Patient not taking: Reported on 7/30/2021) 60 packet 3    Melatonin 10 MG TABS Take 10-20 mg by mouth nightly as needed      Cyanocobalamin (VITAMIN B 12 PO) Take 1 tablet by mouth daily      meclizine (ANTIVERT) 25 MG tablet Take 25 mg by mouth 3 times daily as needed for Dizziness      dicyclomine (BENTYL) 10 MG capsule Take 1 capsule by mouth 3 times daily as needed (abdominal pain) 21 capsule 3    levETIRAcetam (KEPPRA) 1000 MG tablet Take 1 tablet by mouth 2 times daily 60 tablet 3    levothyroxine (SYNTHROID) 125 MCG tablet Take 1 tablet by mouth Daily 30 tablet 3    atenolol (TENORMIN) 25 MG tablet Take 1 tablet by mouth daily 30 tablet 3    cloNIDine (CATAPRES) 0.1 MG tablet Take 1 tablet by mouth 2 times daily (Patient taking differently: Take 0.1 mg by mouth 2 times daily as needed for High Blood Pressure 05/27/21 Patient states she only takes as needed usually if SBP>150) 60 tablet 3    estradiol (ESTRACE) 0.5 MG tablet Take 1 tablet by mouth daily (Patient taking differently: Take 0.5 mg by mouth nightly ) 21 tablet 3    PARoxetine (PAXIL) 30 MG tablet Take 1.5 tablets by mouth nightly (Patient taking differently: Take 30 mg by mouth nightly ) 30 tablet 3    albuterol (PROVENTIL) (2.5 MG/3ML) 0.083% nebulizer solution Take 3 mLs by nebulization every 6 hours as needed for Wheezing 120 each 0    betamethasone valerate (VALISONE) 0.1 % ointment APPLY OINTMENT TO AFFECTED AREA TWICE DAILY TO HANDS AND ELBOWS FOR 21 DAYS      potassium gluconate 550 mg tablet Take 1 tablet by mouth daily      tiZANidine (ZANAFLEX) 4 MG tablet Take 4 mg by mouth every 8 hours as needed       albuterol sulfate  (90 Base) MCG/ACT inhaler Inhale 2 puffs into the lungs 4 times daily 1 Inhaler 5    ferrous sulfate (IRON 325) 325 (65 Fe) MG tablet Take 1 tablet by mouth daily (with breakfast) 90 tablet 5    Cholecalciferol (VITAMIN D3) 5000 units TABS Take 1 tablet by mouth daily      Multiple Vitamin (MVI, BARIATRIC ADVANTAGE MULTI-FORMULA, CHEW TAB) Take 1 tablet by mouth daily 30 tablet 5    Calcium Citrate-Vitamin D (CALCIUM + VIT D, BARIATRIC ADVANTAGE, CHEWABLE TABLET) Take 1 tablet by mouth 2 times daily 120 tablet 5    gabapentin (NEURONTIN) 800 MG tablet Take 800 mg by mouth 3 times daily         Allergies:     Allergies   Allergen Reactions    Aspirin Palpitations     \"My Heart Rate Elevates\"    Shellfish-Derived Products Swelling    Toradol [Ketorolac Tromethamine] Rash    Compazine [Prochlorperazine]     Haldol [Haloperidol]      Shaking      Reglan [Metoclopramide] Itching       Problem List:    Patient Active Problem List   Diagnosis Code    Fibromyalgia M79.7    Lupus (systemic lupus erythematosus) ([facility]) M32.9    Chronic pancreatitis ([facility]) K86.1    HTN (hypertension) I10    Frequent UTI N39.0    Gastroesophageal reflux disease without esophagitis K21.9    Chronic depression F32.9    Fatty liver disease, nonalcoholic N32.5    Arthritis M19.90    Bilateral low back pain with left-sided sciatica M54.42    Hiatal hernia K44.9    Status post laparoscopic sleeve gastrectomy Z98.84    Pseudoseizure F44.5    Chronic pain syndrome G89.4    Drug-seeking/Aberrant behavior Z76.5    Lymph node disorder I89.9    Morbid obesity with BMI of 40.0-44.9, adult ([facility]) E66.01, Z68.41    Iron deficiency anemia D50.9    Seizure disorder ([facility]) G40.909    Anxiety F41.9    RUQ pain R10.11    Status post bariatric surgery Z98.84    Morbid obesity with BMI of 45.0-49.9, adult ([facility]) E66.01, Z68.42    Discoloration of skin of flank resembling ecchymosis L81.9    Morbid obesity with BMI of 50.0-59.9, adult ([facility]) E66.01, Z68.43    Chest pain of uncertain etiology Z50.0    Cellulitis of left thigh L03. 116    Malabsorption K90.9    COPD (chronic obstructive pulmonary disease) ([facility]) J44.9    Hypothyroidism due to acquired atrophy of thyroid E03.4    Osteopenia M85.80    Vitamin D deficiency E55.9    Irritable bowel syndrome K58.9    Malabsorption due to intolerance, not elsewhere classified K90.49    Port-A-Cath in place Z95.828       Past Medical History:        Diagnosis Date    Acid reflux     Anemia     Anxiety     Arthritis     Hands, Back And Ankles    Bleeding ulcer 2014    \"I Had Ulcers In My Stomach And Colon\"/ per pt on 8/12/2019\"they said recently having some blood in my stomach- in July ( 2019)could not find where coming from \"    Bronchitis Last Episode 2014    Chronic back pain     Chronic pain Sees Dr. Mandeep Sanchez COPD [facility]ch[facility] pulmonary [facility])     Sees Dr. Marlen German Depression     Fibromyalgia Dx 2013    GERD (gastroesophageal reflux disease)     H/O echocardiogram 08/11/2015    EF >55%. LA to be at the upper limit of normal in size. LV hypertrophy with normal LV systolic, but abnormal diastolic function. Normal valvular structures and function.  H/O echocardiogram 08/30/2018    EF 55-60%    Hiatal hernia     History of blood transfusion 09/2015 And 2018    No Reaction To Blood Transfusions Received    History of sleeve gastrectomy     [place] (hard of hearing)     Right Ear    Hx of cardiac catheterization 10/24/2010    Angiographically normal coronary arteries w/ normal LV function and wall motion.  Hx of transesophageal echocardiography (PILO) for monitoring 12/02/2010    EF 55-60%. WNL.    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- consult with Dr Orlando Sanderson     \"very difficulty IV stick- had mediport infection in the past- then put picc line in and removed 8/7/2019 now going to put new mediport in\"( 8/15/2019)    Hypertension     follow with Dr ? name   Mara Levo cysts     \"they found that I have a kidney cyst but not sure which side\" per pt on 7/15/2020    Lupus ([address] Dx 2013    \"Rheumatoid Lupus\"    MDRO (multiple drug resistant organisms) resistance     4 months ago chest from mediport    Morbid obesity ([address]     MRSA bacteremia     Nausea     \"Most Of The Time\"    Osteomyelitis of fifth toe of left foot ([address]     Pain management     Sees Dr. Lizabeth Severs, Once A Month    Pancreatitis chronic Dx 2001    Pneumonia Dx 11-15    Seizures ([address]     Shortness of breath on exertion     Shortness of breath on exertion     Sleep apnea     Has CPAP\"no longer use the cpap since lost weight\"    Staph infection Dx 11-15    Left Foot    Staph infection Dx 11-15    \"Left Foot\" N/A 2/12/2019    GASTRECTOMY SLEEVE LAPAROSCOPIC ROBOTIC performed [facility]  08/27/2018    UPPER GASTROINTESTINAL ENDOSCOPY N/A 4/2/2019    EGD CONTROL HEMORRHAGE with epi injection at bleeding site performed by Fatmata L[facility]eli MD at [facility] 6/19/2019    EGD DIAGNOSTIC ONLY performed by Fatmata Lomeli MD at [address] [place] [address] N/A 7/22/2019    EGD DIAGNOSTIC ONLY performed by General Clay MD at [address] [place] [address] N/A 10/14/2019    EGD DIAGNOSTIC ONLY performed by Fatmata Lomeli MD at [address] [place] [address] N/A 7/17/2020    EGJ DILATATION BALLOON WITH 18-20 MM BALLOON, DILATED TO 20 MM. performed by Fatmata Lomeli MD at [address] [place] [address] N/A 5/28/2021    EGD BIOPSY performed by Fatmata Lomeli MD at [address] [place] [address] History:    Social History     Tobacco Use    Smoking status: Never Smoker    Smokeless tobacco: Never Used   Substance Use Topics    Alcohol use: No     Alcohol/week: 0.0 standard drinks                                Counseling given: Not Answered      Vital Signs (Current): There were no vitals filed for this visit.                                            BP Readings from Last 3 Encounters:   08/03/21 (!) 151/86   08/03/21 102/78   08/03/21 (!) 148/76       NPO Status:                                                                                 BMI:   Wt Readings from Last 3 Encounters:   08/12/21 270 lb 12.8 oz (122.8 kg)   08/03/21 275 lb 3.2 oz (124.8 kg)   08/03/21 274 lb (124.3 kg)     There is no height or weight on file to calculate BMI.    CBC:   Lab Results   Component Value Date    WBC 4.2 08/03/2021    RBC 4.50 08/03/2021    HGB 11.8 08/03/2021    HCT 38.0 08/03/2021    MCV 84.4 Anesthesia Plan      general     ASA 3     ( Pre Anesthesia Assessment complete. Chart reviewed on 8/13/2021)  Induction: intravenous. MIPS: Postoperative opioids intended and Prophylactic antiemetics administered. Anesthetic plan and risks discussed with patient. Use of blood products discussed with patient whom consented to blood products. Plan discussed with CRNA.     Attending anesthesiologist reviewed and agrees with [id number] - CRNA   8/13/2021

## 2021-08-16 ENCOUNTER — HOSPITAL ENCOUNTER (INPATIENT)
Age: 51
LOS: 2 days | Discharge: HOME | DRG: 620 | End: 2021-08-18
Attending: SURGERY | Admitting: SURGERY
Payer: COMMERCIAL

## 2021-08-16 ENCOUNTER — ANESTHESIA (OUTPATIENT)
Dept: OPERATING ROOM | Age: 51
DRG: 620 | End: 2021-08-16
Payer: COMMERCIAL

## 2021-08-16 VITALS
OXYGEN SATURATION: 100 % | DIASTOLIC BLOOD PRESSURE: 89 MMHG | RESPIRATION RATE: 1 BRPM | SYSTOLIC BLOOD PRESSURE: 175 MMHG | TEMPERATURE: 94.4 F

## 2021-08-16 DIAGNOSIS — E66.01 MORBID OBESITY WITH BMI OF 50.0-59.9, ADULT (HCC): ICD-10-CM

## 2021-08-16 DIAGNOSIS — Z98.84 HISTORY OF ROUX-EN-Y GASTRIC BYPASS: Primary | ICD-10-CM

## 2021-08-16 DIAGNOSIS — E66.01 MORBID OBESITY WITH BMI OF 40.0-44.9, ADULT (HCC): ICD-10-CM

## 2021-08-16 PROCEDURE — 2580000003 HC RX 258: Performed by: SURGERY

## 2021-08-16 PROCEDURE — 7100000001 HC PACU RECOVERY - ADDTL 15 MIN: Performed by: SURGERY

## 2021-08-16 PROCEDURE — 2500000003 HC RX 250 WO HCPCS

## 2021-08-16 PROCEDURE — 6360000002 HC RX W HCPCS: Performed by: ANESTHESIOLOGY

## 2021-08-16 PROCEDURE — 6360000002 HC RX W HCPCS

## 2021-08-16 PROCEDURE — S2900 ROBOTIC SURGICAL SYSTEM: HCPCS | Performed by: SURGERY

## 2021-08-16 PROCEDURE — 6360000002 HC RX W HCPCS: Performed by: SURGERY

## 2021-08-16 PROCEDURE — 2720000010 HC SURG SUPPLY STERILE: Performed by: SURGERY

## 2021-08-16 PROCEDURE — 0D164ZA BYPASS STOMACH TO JEJUNUM, PERCUTANEOUS ENDOSCOPIC APPROACH: ICD-10-PCS | Performed by: SURGERY

## 2021-08-16 PROCEDURE — C9290 INJ, BUPIVACAINE LIPOSOME: HCPCS | Performed by: SURGERY

## 2021-08-16 PROCEDURE — C1889 IMPLANT/INSERT DEVICE, NOC: HCPCS | Performed by: SURGERY

## 2021-08-16 PROCEDURE — C9113 INJ PANTOPRAZOLE SODIUM, VIA: HCPCS | Performed by: SURGERY

## 2021-08-16 PROCEDURE — 2500000003 HC RX 250 WO HCPCS: Performed by: SURGERY

## 2021-08-16 PROCEDURE — 6360000002 HC RX W HCPCS: Performed by: INTERNAL MEDICINE

## 2021-08-16 PROCEDURE — 0DNU0ZZ RELEASE OMENTUM, OPEN APPROACH: ICD-10-PCS | Performed by: SURGERY

## 2021-08-16 PROCEDURE — 2709999900 HC NON-CHARGEABLE SUPPLY: Performed by: SURGERY

## 2021-08-16 PROCEDURE — 3600000019 HC SURGERY ROBOT ADDTL 15MIN: Performed by: SURGERY

## 2021-08-16 PROCEDURE — 3600000009 HC SURGERY ROBOT BASE: Performed by: SURGERY

## 2021-08-16 PROCEDURE — 7100000000 HC PACU RECOVERY - FIRST 15 MIN: Performed by: SURGERY

## 2021-08-16 PROCEDURE — 43644 LAP GASTRIC BYPASS/ROUX-EN-Y: CPT | Performed by: SURGERY

## 2021-08-16 PROCEDURE — 6370000000 HC RX 637 (ALT 250 FOR IP): Performed by: SURGERY

## 2021-08-16 PROCEDURE — 3700000000 HC ANESTHESIA ATTENDED CARE: Performed by: SURGERY

## 2021-08-16 PROCEDURE — 1200000000 HC SEMI PRIVATE

## 2021-08-16 PROCEDURE — 3700000001 HC ADD 15 MINUTES (ANESTHESIA): Performed by: SURGERY

## 2021-08-16 RX ORDER — POTASSIUM CHLORIDE 7.45 MG/ML
10 INJECTION INTRAVENOUS PRN
Status: DISCONTINUED | OUTPATIENT
Start: 2021-08-16 | End: 2021-08-18 | Stop reason: HOSPADM

## 2021-08-16 RX ORDER — HYDROMORPHONE HCL 110MG/55ML
PATIENT CONTROLLED ANALGESIA SYRINGE INTRAVENOUS PRN
Status: DISCONTINUED | OUTPATIENT
Start: 2021-08-16 | End: 2021-08-16 | Stop reason: SDUPTHER

## 2021-08-16 RX ORDER — ALBUTEROL SULFATE 90 UG/1
2 AEROSOL, METERED RESPIRATORY (INHALATION) 4 TIMES DAILY
Status: DISCONTINUED | OUTPATIENT
Start: 2021-08-16 | End: 2021-08-18 | Stop reason: HOSPADM

## 2021-08-16 RX ORDER — ATENOLOL 25 MG/1
25 TABLET ORAL DAILY
Status: DISCONTINUED | OUTPATIENT
Start: 2021-08-17 | End: 2021-08-18 | Stop reason: HOSPADM

## 2021-08-16 RX ORDER — FENTANYL CITRATE 50 UG/ML
25 INJECTION, SOLUTION INTRAMUSCULAR; INTRAVENOUS EVERY 5 MIN PRN
Status: DISCONTINUED | OUTPATIENT
Start: 2021-08-16 | End: 2021-08-16 | Stop reason: HOSPADM

## 2021-08-16 RX ORDER — LEVETIRACETAM 500 MG/1
1000 TABLET ORAL 2 TIMES DAILY
Status: DISCONTINUED | OUTPATIENT
Start: 2021-08-16 | End: 2021-08-18 | Stop reason: HOSPADM

## 2021-08-16 RX ORDER — ONDANSETRON 2 MG/ML
INJECTION INTRAMUSCULAR; INTRAVENOUS PRN
Status: DISCONTINUED | OUTPATIENT
Start: 2021-08-16 | End: 2021-08-16 | Stop reason: SDUPTHER

## 2021-08-16 RX ORDER — KETAMINE HCL 50MG/ML(1)
SYRINGE (ML) INTRAVENOUS PRN
Status: DISCONTINUED | OUTPATIENT
Start: 2021-08-16 | End: 2021-08-16 | Stop reason: SDUPTHER

## 2021-08-16 RX ORDER — MIDAZOLAM HYDROCHLORIDE 1 MG/ML
INJECTION INTRAMUSCULAR; INTRAVENOUS PRN
Status: DISCONTINUED | OUTPATIENT
Start: 2021-08-16 | End: 2021-08-16 | Stop reason: SDUPTHER

## 2021-08-16 RX ORDER — ROCURONIUM BROMIDE 10 MG/ML
INJECTION, SOLUTION INTRAVENOUS PRN
Status: DISCONTINUED | OUTPATIENT
Start: 2021-08-16 | End: 2021-08-16 | Stop reason: SDUPTHER

## 2021-08-16 RX ORDER — GLYCOPYRROLATE 1 MG/5 ML
SYRINGE (ML) INTRAVENOUS PRN
Status: DISCONTINUED | OUTPATIENT
Start: 2021-08-16 | End: 2021-08-16 | Stop reason: SDUPTHER

## 2021-08-16 RX ORDER — LABETALOL HYDROCHLORIDE 5 MG/ML
5 INJECTION, SOLUTION INTRAVENOUS EVERY 10 MIN PRN
Status: DISCONTINUED | OUTPATIENT
Start: 2021-08-16 | End: 2021-08-16 | Stop reason: HOSPADM

## 2021-08-16 RX ORDER — PANTOPRAZOLE SODIUM 40 MG/10ML
40 INJECTION, POWDER, LYOPHILIZED, FOR SOLUTION INTRAVENOUS 2 TIMES DAILY
Status: DISCONTINUED | OUTPATIENT
Start: 2021-08-16 | End: 2021-08-18 | Stop reason: HOSPADM

## 2021-08-16 RX ORDER — PROMETHAZINE HYDROCHLORIDE 25 MG/ML
12.5 INJECTION, SOLUTION INTRAMUSCULAR; INTRAVENOUS EVERY 6 HOURS PRN
Status: DISCONTINUED | OUTPATIENT
Start: 2021-08-16 | End: 2021-08-18 | Stop reason: HOSPADM

## 2021-08-16 RX ORDER — SUCCINYLCHOLINE/SOD CL,ISO/PF 100 MG/5ML
SYRINGE (ML) INTRAVENOUS PRN
Status: DISCONTINUED | OUTPATIENT
Start: 2021-08-16 | End: 2021-08-16 | Stop reason: SDUPTHER

## 2021-08-16 RX ORDER — SODIUM CHLORIDE 0.9 % (FLUSH) 0.9 %
10 SYRINGE (ML) INJECTION EVERY 12 HOURS SCHEDULED
Status: DISCONTINUED | OUTPATIENT
Start: 2021-08-16 | End: 2021-08-18 | Stop reason: HOSPADM

## 2021-08-16 RX ORDER — FENTANYL CITRATE 50 UG/ML
INJECTION, SOLUTION INTRAMUSCULAR; INTRAVENOUS PRN
Status: DISCONTINUED | OUTPATIENT
Start: 2021-08-16 | End: 2021-08-16 | Stop reason: SDUPTHER

## 2021-08-16 RX ORDER — HYDROMORPHONE HCL 110MG/55ML
1 PATIENT CONTROLLED ANALGESIA SYRINGE INTRAVENOUS
Status: DISCONTINUED | OUTPATIENT
Start: 2021-08-16 | End: 2021-08-16

## 2021-08-16 RX ORDER — SCOLOPAMINE TRANSDERMAL SYSTEM 1 MG/1
1 PATCH, EXTENDED RELEASE TRANSDERMAL
Status: DISCONTINUED | OUTPATIENT
Start: 2021-08-16 | End: 2021-08-16

## 2021-08-16 RX ORDER — LEVETIRACETAM 10 MG/ML
1000 INJECTION INTRAVASCULAR ONCE
Status: COMPLETED | OUTPATIENT
Start: 2021-08-16 | End: 2021-08-16

## 2021-08-16 RX ORDER — HYDROMORPHONE HCL 110MG/55ML
1.5 PATIENT CONTROLLED ANALGESIA SYRINGE INTRAVENOUS
Status: DISCONTINUED | OUTPATIENT
Start: 2021-08-16 | End: 2021-08-18 | Stop reason: HOSPADM

## 2021-08-16 RX ORDER — HYDRALAZINE HYDROCHLORIDE 20 MG/ML
5 INJECTION INTRAMUSCULAR; INTRAVENOUS EVERY 10 MIN PRN
Status: DISCONTINUED | OUTPATIENT
Start: 2021-08-16 | End: 2021-08-16 | Stop reason: HOSPADM

## 2021-08-16 RX ORDER — EPHEDRINE SULFATE 50 MG/ML
INJECTION INTRAVENOUS PRN
Status: DISCONTINUED | OUTPATIENT
Start: 2021-08-16 | End: 2021-08-16 | Stop reason: SDUPTHER

## 2021-08-16 RX ORDER — PROCHLORPERAZINE EDISYLATE 5 MG/ML
10 INJECTION INTRAMUSCULAR; INTRAVENOUS EVERY 6 HOURS PRN
Status: DISCONTINUED | OUTPATIENT
Start: 2021-08-16 | End: 2021-08-18 | Stop reason: HOSPADM

## 2021-08-16 RX ORDER — LIDOCAINE HYDROCHLORIDE 20 MG/ML
INJECTION, SOLUTION INTRAVENOUS PRN
Status: DISCONTINUED | OUTPATIENT
Start: 2021-08-16 | End: 2021-08-16 | Stop reason: SDUPTHER

## 2021-08-16 RX ORDER — HYDROMORPHONE HCL 110MG/55ML
0.5 PATIENT CONTROLLED ANALGESIA SYRINGE INTRAVENOUS EVERY 5 MIN PRN
Status: DISCONTINUED | OUTPATIENT
Start: 2021-08-16 | End: 2021-08-16 | Stop reason: HOSPADM

## 2021-08-16 RX ORDER — HYDROMORPHONE HCL 110MG/55ML
0.25 PATIENT CONTROLLED ANALGESIA SYRINGE INTRAVENOUS EVERY 5 MIN PRN
Status: DISCONTINUED | OUTPATIENT
Start: 2021-08-16 | End: 2021-08-16 | Stop reason: HOSPADM

## 2021-08-16 RX ORDER — SODIUM CHLORIDE 0.9 % (FLUSH) 0.9 %
10 SYRINGE (ML) INJECTION PRN
Status: DISCONTINUED | OUTPATIENT
Start: 2021-08-16 | End: 2021-08-18 | Stop reason: HOSPADM

## 2021-08-16 RX ORDER — GABAPENTIN 400 MG/1
800 CAPSULE ORAL 3 TIMES DAILY
Status: DISCONTINUED | OUTPATIENT
Start: 2021-08-16 | End: 2021-08-18 | Stop reason: HOSPADM

## 2021-08-16 RX ORDER — ONDANSETRON 2 MG/ML
4 INJECTION INTRAMUSCULAR; INTRAVENOUS EVERY 4 HOURS
Status: DISCONTINUED | OUTPATIENT
Start: 2021-08-16 | End: 2021-08-18 | Stop reason: HOSPADM

## 2021-08-16 RX ORDER — DEXTROSE, SODIUM CHLORIDE, AND POTASSIUM CHLORIDE 5; .45; .15 G/100ML; G/100ML; G/100ML
INJECTION INTRAVENOUS CONTINUOUS
Status: DISCONTINUED | OUTPATIENT
Start: 2021-08-16 | End: 2021-08-18 | Stop reason: HOSPADM

## 2021-08-16 RX ORDER — ONDANSETRON 2 MG/ML
4 INJECTION INTRAMUSCULAR; INTRAVENOUS ONCE
Status: COMPLETED | OUTPATIENT
Start: 2021-08-16 | End: 2021-08-16

## 2021-08-16 RX ORDER — ALBUTEROL SULFATE 2.5 MG/3ML
2.5 SOLUTION RESPIRATORY (INHALATION) EVERY 6 HOURS PRN
Status: DISCONTINUED | OUTPATIENT
Start: 2021-08-16 | End: 2021-08-18 | Stop reason: HOSPADM

## 2021-08-16 RX ORDER — DEXAMETHASONE SODIUM PHOSPHATE 4 MG/ML
INJECTION, SOLUTION INTRA-ARTICULAR; INTRALESIONAL; INTRAMUSCULAR; INTRAVENOUS; SOFT TISSUE PRN
Status: DISCONTINUED | OUTPATIENT
Start: 2021-08-16 | End: 2021-08-16 | Stop reason: SDUPTHER

## 2021-08-16 RX ORDER — HEPARIN SODIUM 5000 [USP'U]/ML
5000 INJECTION, SOLUTION INTRAVENOUS; SUBCUTANEOUS ONCE
Status: COMPLETED | OUTPATIENT
Start: 2021-08-16 | End: 2021-08-16

## 2021-08-16 RX ORDER — LORAZEPAM 2 MG/ML
1 INJECTION INTRAMUSCULAR NIGHTLY PRN
Status: DISCONTINUED | OUTPATIENT
Start: 2021-08-17 | End: 2021-08-18 | Stop reason: HOSPADM

## 2021-08-16 RX ORDER — SODIUM CHLORIDE 9 MG/ML
25 INJECTION, SOLUTION INTRAVENOUS PRN
Status: DISCONTINUED | OUTPATIENT
Start: 2021-08-16 | End: 2021-08-18 | Stop reason: HOSPADM

## 2021-08-16 RX ORDER — LEVOTHYROXINE SODIUM 0.12 MG/1
125 TABLET ORAL DAILY
Status: DISCONTINUED | OUTPATIENT
Start: 2021-08-17 | End: 2021-08-18 | Stop reason: HOSPADM

## 2021-08-16 RX ORDER — MAGNESIUM SULFATE 1 G/100ML
1000 INJECTION INTRAVENOUS PRN
Status: DISCONTINUED | OUTPATIENT
Start: 2021-08-16 | End: 2021-08-18 | Stop reason: HOSPADM

## 2021-08-16 RX ORDER — CEFAZOLIN SODIUM 2 G/100ML
2000 INJECTION, SOLUTION INTRAVENOUS EVERY 8 HOURS
Status: DISCONTINUED | OUTPATIENT
Start: 2021-08-16 | End: 2021-08-16 | Stop reason: SDUPTHER

## 2021-08-16 RX ORDER — PROPOFOL 10 MG/ML
INJECTION, EMULSION INTRAVENOUS PRN
Status: DISCONTINUED | OUTPATIENT
Start: 2021-08-16 | End: 2021-08-16 | Stop reason: SDUPTHER

## 2021-08-16 RX ORDER — MORPHINE SULFATE 2 MG/ML
2 INJECTION, SOLUTION INTRAMUSCULAR; INTRAVENOUS EVERY 5 MIN PRN
Status: DISCONTINUED | OUTPATIENT
Start: 2021-08-16 | End: 2021-08-16 | Stop reason: HOSPADM

## 2021-08-16 RX ORDER — SODIUM CHLORIDE, SODIUM LACTATE, POTASSIUM CHLORIDE, CALCIUM CHLORIDE 600; 310; 30; 20 MG/100ML; MG/100ML; MG/100ML; MG/100ML
INJECTION, SOLUTION INTRAVENOUS CONTINUOUS
Status: DISCONTINUED | OUTPATIENT
Start: 2021-08-16 | End: 2021-08-16

## 2021-08-16 RX ADMIN — HYDROMORPHONE HYDROCHLORIDE 0.5 MG: 2 INJECTION INTRAMUSCULAR; INTRAVENOUS; SUBCUTANEOUS at 14:25

## 2021-08-16 RX ADMIN — ROCURONIUM BROMIDE 20 MG: 10 INJECTION INTRAVENOUS at 13:24

## 2021-08-16 RX ADMIN — METRONIDAZOLE 750 MG: 500 INJECTION, SOLUTION INTRAVENOUS at 12:26

## 2021-08-16 RX ADMIN — LIDOCAINE HYDROCHLORIDE 100 MG: 20 INJECTION, SOLUTION INTRAVENOUS at 12:13

## 2021-08-16 RX ADMIN — ONDANSETRON 4 MG: 2 INJECTION INTRAMUSCULAR; INTRAVENOUS at 11:05

## 2021-08-16 RX ADMIN — ROCURONIUM BROMIDE 10 MG: 10 INJECTION INTRAVENOUS at 13:11

## 2021-08-16 RX ADMIN — ONDANSETRON 4 MG: 2 INJECTION INTRAMUSCULAR; INTRAVENOUS at 15:48

## 2021-08-16 RX ADMIN — HYDROMORPHONE HYDROCHLORIDE 0.5 MG: 2 INJECTION INTRAMUSCULAR; INTRAVENOUS; SUBCUTANEOUS at 14:29

## 2021-08-16 RX ADMIN — EPHEDRINE SULFATE 15 MG: 50 INJECTION INTRAVENOUS at 12:20

## 2021-08-16 RX ADMIN — Medication 0.3 MG: at 12:22

## 2021-08-16 RX ADMIN — CEFAZOLIN SODIUM 3000 MG: 1 INJECTION, POWDER, FOR SOLUTION INTRAMUSCULAR; INTRAVENOUS at 21:12

## 2021-08-16 RX ADMIN — ROCURONIUM BROMIDE 10 MG: 10 INJECTION INTRAVENOUS at 14:57

## 2021-08-16 RX ADMIN — PANTOPRAZOLE SODIUM 40 MG: 40 INJECTION, POWDER, FOR SOLUTION INTRAVENOUS at 20:10

## 2021-08-16 RX ADMIN — CEFAZOLIN SODIUM 3000 MG: 1 INJECTION, POWDER, FOR SOLUTION INTRAMUSCULAR; INTRAVENOUS at 12:15

## 2021-08-16 RX ADMIN — CHLORHEXIDINE GLUCONATE: 4 LIQUID TOPICAL at 11:11

## 2021-08-16 RX ADMIN — FENTANYL CITRATE 50 MCG: 50 INJECTION, SOLUTION INTRAMUSCULAR; INTRAVENOUS at 12:10

## 2021-08-16 RX ADMIN — HEPARIN SODIUM 5000 UNITS: 5000 INJECTION INTRAVENOUS; SUBCUTANEOUS at 11:06

## 2021-08-16 RX ADMIN — EPHEDRINE SULFATE 10 MG: 50 INJECTION INTRAVENOUS at 12:18

## 2021-08-16 RX ADMIN — METRONIDAZOLE 500 MG: 500 INJECTION, SOLUTION INTRAVENOUS at 20:10

## 2021-08-16 RX ADMIN — SODIUM CHLORIDE, POTASSIUM CHLORIDE, SODIUM LACTATE AND CALCIUM CHLORIDE: 600; 310; 30; 20 INJECTION, SOLUTION INTRAVENOUS at 14:14

## 2021-08-16 RX ADMIN — HYDROMORPHONE HYDROCHLORIDE 0.5 MG: 2 INJECTION INTRAMUSCULAR; INTRAVENOUS; SUBCUTANEOUS at 16:55

## 2021-08-16 RX ADMIN — ROCURONIUM BROMIDE 40 MG: 10 INJECTION INTRAVENOUS at 12:24

## 2021-08-16 RX ADMIN — Medication 50 MG: at 12:28

## 2021-08-16 RX ADMIN — PROPOFOL 150 MG: 10 INJECTION, EMULSION INTRAVENOUS at 12:13

## 2021-08-16 RX ADMIN — HYDROMORPHONE HYDROCHLORIDE 0.5 MG: 2 INJECTION INTRAMUSCULAR; INTRAVENOUS; SUBCUTANEOUS at 16:19

## 2021-08-16 RX ADMIN — SODIUM CHLORIDE, PRESERVATIVE FREE 10 ML: 5 INJECTION INTRAVENOUS at 20:11

## 2021-08-16 RX ADMIN — LEVETIRACETAM 1000 MG: 10 INJECTION INTRAVENOUS at 22:18

## 2021-08-16 RX ADMIN — SODIUM CHLORIDE, POTASSIUM CHLORIDE, SODIUM LACTATE AND CALCIUM CHLORIDE: 600; 310; 30; 20 INJECTION, SOLUTION INTRAVENOUS at 11:05

## 2021-08-16 RX ADMIN — FENTANYL CITRATE 50 MCG: 50 INJECTION, SOLUTION INTRAMUSCULAR; INTRAVENOUS at 13:24

## 2021-08-16 RX ADMIN — HYDROMORPHONE HYDROCHLORIDE 0.5 MG: 2 INJECTION INTRAMUSCULAR; INTRAVENOUS; SUBCUTANEOUS at 15:23

## 2021-08-16 RX ADMIN — ONDANSETRON 4 MG: 2 INJECTION INTRAMUSCULAR; INTRAVENOUS at 22:18

## 2021-08-16 RX ADMIN — ROCURONIUM BROMIDE 10 MG: 10 INJECTION INTRAVENOUS at 14:15

## 2021-08-16 RX ADMIN — HYDROMORPHONE HYDROCHLORIDE 1 MG: 2 INJECTION, SOLUTION INTRAMUSCULAR; INTRAVENOUS; SUBCUTANEOUS at 22:27

## 2021-08-16 RX ADMIN — MIDAZOLAM 2 MG: 1 INJECTION INTRAMUSCULAR; INTRAVENOUS at 12:03

## 2021-08-16 RX ADMIN — Medication 100 MG: at 12:13

## 2021-08-16 RX ADMIN — HYDROMORPHONE HYDROCHLORIDE 1 MG: 1 INJECTION, SOLUTION INTRAMUSCULAR; INTRAVENOUS; SUBCUTANEOUS at 20:10

## 2021-08-16 RX ADMIN — HYDROMORPHONE HYDROCHLORIDE 0.5 MG: 2 INJECTION INTRAMUSCULAR; INTRAVENOUS; SUBCUTANEOUS at 17:00

## 2021-08-16 RX ADMIN — DEXAMETHASONE SODIUM PHOSPHATE 8 MG: 4 INJECTION, SOLUTION INTRAMUSCULAR; INTRAVENOUS at 12:27

## 2021-08-16 RX ADMIN — POTASSIUM CHLORIDE, DEXTROSE MONOHYDRATE AND SODIUM CHLORIDE: 150; 5; 450 INJECTION, SOLUTION INTRAVENOUS at 16:34

## 2021-08-16 RX ADMIN — SUGAMMADEX 200 MG: 100 INJECTION, SOLUTION INTRAVENOUS at 15:57

## 2021-08-16 ASSESSMENT — PULMONARY FUNCTION TESTS
PIF_VALUE: 31
PIF_VALUE: 31
PIF_VALUE: 34
PIF_VALUE: 35
PIF_VALUE: 34
PIF_VALUE: 33
PIF_VALUE: 37
PIF_VALUE: 34
PIF_VALUE: 35
PIF_VALUE: 35
PIF_VALUE: 36
PIF_VALUE: 3
PIF_VALUE: 20
PIF_VALUE: 33
PIF_VALUE: 32
PIF_VALUE: 34
PIF_VALUE: 34
PIF_VALUE: 35
PIF_VALUE: 32
PIF_VALUE: 35
PIF_VALUE: 36
PIF_VALUE: 32
PIF_VALUE: 31
PIF_VALUE: 34
PIF_VALUE: 33
PIF_VALUE: 35
PIF_VALUE: 34
PIF_VALUE: 35
PIF_VALUE: 33
PIF_VALUE: 1
PIF_VALUE: 33
PIF_VALUE: 33
PIF_VALUE: 36
PIF_VALUE: 33
PIF_VALUE: 20
PIF_VALUE: 34
PIF_VALUE: 36
PIF_VALUE: 36
PIF_VALUE: 31
PIF_VALUE: 35
PIF_VALUE: 35
PIF_VALUE: 31
PIF_VALUE: 36
PIF_VALUE: 34
PIF_VALUE: 35
PIF_VALUE: 3
PIF_VALUE: 27
PIF_VALUE: 32
PIF_VALUE: 8
PIF_VALUE: 35
PIF_VALUE: 23
PIF_VALUE: 36
PIF_VALUE: 35
PIF_VALUE: 33
PIF_VALUE: 21
PIF_VALUE: 36
PIF_VALUE: 33
PIF_VALUE: 36
PIF_VALUE: 33
PIF_VALUE: 34
PIF_VALUE: 32
PIF_VALUE: 34
PIF_VALUE: 36
PIF_VALUE: 32
PIF_VALUE: 36
PIF_VALUE: 34
PIF_VALUE: 35
PIF_VALUE: 27
PIF_VALUE: 35
PIF_VALUE: 36
PIF_VALUE: 32
PIF_VALUE: 33
PIF_VALUE: 2
PIF_VALUE: 24
PIF_VALUE: 34
PIF_VALUE: 32
PIF_VALUE: 35
PIF_VALUE: 3
PIF_VALUE: 36
PIF_VALUE: 24
PIF_VALUE: 35
PIF_VALUE: 36
PIF_VALUE: 27
PIF_VALUE: 32
PIF_VALUE: 35
PIF_VALUE: 35
PIF_VALUE: 33
PIF_VALUE: 32
PIF_VALUE: 34
PIF_VALUE: 33
PIF_VALUE: 35
PIF_VALUE: 32
PIF_VALUE: 35
PIF_VALUE: 35
PIF_VALUE: 34
PIF_VALUE: 32
PIF_VALUE: 24
PIF_VALUE: 34
PIF_VALUE: 35
PIF_VALUE: 23
PIF_VALUE: 35
PIF_VALUE: 36
PIF_VALUE: 33
PIF_VALUE: 35
PIF_VALUE: 35
PIF_VALUE: 33
PIF_VALUE: 33
PIF_VALUE: 34
PIF_VALUE: 28
PIF_VALUE: 34
PIF_VALUE: 32
PIF_VALUE: 32
PIF_VALUE: 24
PIF_VALUE: 32
PIF_VALUE: 32
PIF_VALUE: 1
PIF_VALUE: 1
PIF_VALUE: 24
PIF_VALUE: 31
PIF_VALUE: 23
PIF_VALUE: 3
PIF_VALUE: 31
PIF_VALUE: 35
PIF_VALUE: 24
PIF_VALUE: 34
PIF_VALUE: 0
PIF_VALUE: 34
PIF_VALUE: 34
PIF_VALUE: 24
PIF_VALUE: 32
PIF_VALUE: 33
PIF_VALUE: 20
PIF_VALUE: 28
PIF_VALUE: 37
PIF_VALUE: 33
PIF_VALUE: 36
PIF_VALUE: 36
PIF_VALUE: 34
PIF_VALUE: 33
PIF_VALUE: 32
PIF_VALUE: 32
PIF_VALUE: 34
PIF_VALUE: 27
PIF_VALUE: 31
PIF_VALUE: 34
PIF_VALUE: 36
PIF_VALUE: 36
PIF_VALUE: 33
PIF_VALUE: 36
PIF_VALUE: 32
PIF_VALUE: 36
PIF_VALUE: 32
PIF_VALUE: 31
PIF_VALUE: 35
PIF_VALUE: 30
PIF_VALUE: 23
PIF_VALUE: 31
PIF_VALUE: 35
PIF_VALUE: 33
PIF_VALUE: 36
PIF_VALUE: 35
PIF_VALUE: 36
PIF_VALUE: 35
PIF_VALUE: 36
PIF_VALUE: 27
PIF_VALUE: 35
PIF_VALUE: 33
PIF_VALUE: 35
PIF_VALUE: 36
PIF_VALUE: 35
PIF_VALUE: 35
PIF_VALUE: 36
PIF_VALUE: 34
PIF_VALUE: 37
PIF_VALUE: 33
PIF_VALUE: 0
PIF_VALUE: 36
PIF_VALUE: 35
PIF_VALUE: 32
PIF_VALUE: 28
PIF_VALUE: 24
PIF_VALUE: 3
PIF_VALUE: 36
PIF_VALUE: 27
PIF_VALUE: 36
PIF_VALUE: 1
PIF_VALUE: 32
PIF_VALUE: 35
PIF_VALUE: 36
PIF_VALUE: 35
PIF_VALUE: 32
PIF_VALUE: 33
PIF_VALUE: 33
PIF_VALUE: 32
PIF_VALUE: 29
PIF_VALUE: 33
PIF_VALUE: 35
PIF_VALUE: 36
PIF_VALUE: 33
PIF_VALUE: 35
PIF_VALUE: 34
PIF_VALUE: 33
PIF_VALUE: 20
PIF_VALUE: 35
PIF_VALUE: 34
PIF_VALUE: 35
PIF_VALUE: 29
PIF_VALUE: 32
PIF_VALUE: 32
PIF_VALUE: 35
PIF_VALUE: 35
PIF_VALUE: 36
PIF_VALUE: 32
PIF_VALUE: 34
PIF_VALUE: 24
PIF_VALUE: 32
PIF_VALUE: 33
PIF_VALUE: 35
PIF_VALUE: 33
PIF_VALUE: 35
PIF_VALUE: 1
PIF_VALUE: 23
PIF_VALUE: 34
PIF_VALUE: 35
PIF_VALUE: 35
PIF_VALUE: 33
PIF_VALUE: 24
PIF_VALUE: 33
PIF_VALUE: 34
PIF_VALUE: 33
PIF_VALUE: 32
PIF_VALUE: 24
PIF_VALUE: 36
PIF_VALUE: 32
PIF_VALUE: 34
PIF_VALUE: 31
PIF_VALUE: 6
PIF_VALUE: 32
PIF_VALUE: 33
PIF_VALUE: 33
PIF_VALUE: 35
PIF_VALUE: 33
PIF_VALUE: 37
PIF_VALUE: 22
PIF_VALUE: 30
PIF_VALUE: 32
PIF_VALUE: 35
PIF_VALUE: 35
PIF_VALUE: 27
PIF_VALUE: 36
PIF_VALUE: 32
PIF_VALUE: 27
PIF_VALUE: 35
PIF_VALUE: 36
PIF_VALUE: 35

## 2021-08-16 ASSESSMENT — PAIN SCALES - GENERAL
PAINLEVEL_OUTOF10: 8
PAINLEVEL_OUTOF10: 7
PAINLEVEL_OUTOF10: 7
PAINLEVEL_OUTOF10: 10
PAINLEVEL_OUTOF10: 8
PAINLEVEL_OUTOF10: 8
PAINLEVEL_OUTOF10: 10

## 2021-08-16 ASSESSMENT — PAIN DESCRIPTION - ORIENTATION: ORIENTATION: LEFT

## 2021-08-16 ASSESSMENT — ENCOUNTER SYMPTOMS
RESPIRATORY NEGATIVE: 1
NAUSEA: 1
ABDOMINAL PAIN: 1

## 2021-08-16 ASSESSMENT — PAIN DESCRIPTION - DESCRIPTORS
DESCRIPTORS: ACHING;DISCOMFORT
DESCRIPTORS: ACHING;DISCOMFORT
DESCRIPTORS: SHARP

## 2021-08-16 ASSESSMENT — PAIN DESCRIPTION - LOCATION
LOCATION: ABDOMEN

## 2021-08-16 ASSESSMENT — PAIN DESCRIPTION - PAIN TYPE
TYPE: SURGICAL PAIN

## 2021-08-16 ASSESSMENT — PAIN - FUNCTIONAL ASSESSMENT: PAIN_FUNCTIONAL_ASSESSMENT: 0-10

## 2021-08-16 ASSESSMENT — PAIN DESCRIPTION - FREQUENCY
FREQUENCY: CONTINUOUS

## 2021-08-16 ASSESSMENT — PAIN DESCRIPTION - ONSET: ONSET: ON-GOING

## 2021-08-16 ASSESSMENT — PAIN DESCRIPTION - PROGRESSION: CLINICAL_PROGRESSION: GRADUALLY IMPROVING

## 2021-08-16 NOTE — OP NOTE
OPERATIVE / PROCEDURE NOTE    Toshia, [address] [facility], 1970, 44 y.o.,  female, CSN: [id number]  August 16, 2021    PRE-OP DIAGNOSIS: Morbid obesity: Body mass index is 45.97 kg/m².  +   Past Medical History:   Diagnosis Date    Acid reflux     Anemia     Anxiety     Arthritis     Hands, Back And Ankles    Bleeding ulcer 2014    \"I Had Ulcers In My Stomach And Colon\"/ per pt on 8/12/2019\"they said recently having some blood in my stomach- in July ( 2019)could not find where coming from \"    Bronchitis Last Episode 2014    Chronic back pain     Chronic pain     Sees Dr. Tea Yuan COPD (chronic obstructive pulmonary disease) ([facility] [address]     Sees Dr. Brenda Grover Depression     Fibromyalgia Dx 2013    GERD (gastroesophageal reflux disease)     H/O echocardiogram 08/11/2015    EF >55%. LA to be at the upper limit of normal in size. LV hypertrophy with normal LV systolic, but abnormal diastolic function. Normal valvular structures and function.  H/O echocardiogram 08/30/2018    EF 55-60%    Hiatal hernia     History of blood transfusion 09/2015 And 2018    No Reaction To Blood Transfusions Received    History of sleeve gastrectomy     [place] (hard of hearing)     Right Ear    Hx of cardiac catheterization 10/24/2010    Angiographically normal coronary arteries w/ normal LV function and wall motion.  Hx of transesophageal echocardiography (PILO) for monitoring 12/02/2010    EF 55-60%. WNL.    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- consult with Dr Marcus Gale     \"very difficulty IV stick- had mediport infection in the past- then put picc line in and removed 8/7/2019 now going to put new mediport in\"( 8/15/2019)    Hypertension     follow with Dr ? name   [place] cysts     \"they found that I have a kidney cyst but not sure which side\" per pt on 7/15/2020    Lupus ([facility] [address] Dx 2013    \"Rheumatoid Lupus\"    DRAINS: None. COMPLICATIONS: None. CONDITION: Stable, extubated to the PACU. OPERATIVE DESCRIPTION: After proper informed consent was signed by the  patient, knowing all the risks, benefits, potential complications, possible alternatives of the procedure, after having complied with adequate preoperative supervised diet and exercise regimen, psych evacuation was performed, cardiac, pulmonology clearance preop EGD and adequate compliance  were all performed. In the holding area, she received Ancef  and Flagyl IV. TEDs and SCDs were placed on her legs and activated  bilaterally. She received 5000 units of heparin subQ. She was brought to the  operating room, was placed supine on the operating room table and after  institution of general endotracheal anesthesia, a Ryder catheter was placed. Her abdomen was prepped and draped in the usual sterile fashion after using  local anesthetic. All ports were placed in almost linear fashion superior to  the umbilicus 2 handbreadths below the costal margin 10 cm apart one from the  other, 6 ports total were used, 5 transversely supraumbilically and 1  subcostally epigastrically for the [facility] liver retractor. After the  Visiport was used to enter the abdomen through the left rectus under direct  visualization safely, pneumoperitoneum was instituted using CO2 insufflation  up to 14 mmHg pressure. The  patient was placed supine, all da Michell ports were placed. The da Michell robot  was docked in AFTER MAJOR lysis of adhesions laparoscopically was performed, Laparoscopic EXTENSIVE lysis of adhesions BEFORE the procedure was started, due to Significant omental adhesions to the lateral abdominal wall and to the sigmoid colon, using a combination of the vessel sealer Scalpel, bipolar  electrocautery, Cadiere graspers and needle holders.  The ligament of Treitz  was identified, 120 cm distal to the ligament of Treitz 2 sutures were placed,  3-0 Vicryl white for the Jet limb and 3-0 Vicryl purple for the proximal  biliary pancreatic limb. The da Michell stapler was then used to divide the  jejunum at this point and then the Vessel sealer Scalpel was used to divide the  mesentery all the way to its root uneventfully without any bleeding  encountered, preserving both the biliopancreatic limb and the Jet limbs  perfusion very adequately. The Jet limb was then measured 50 cm and then 3  sutures were placed to approximate the biliary pancreatic limb to the common  channel using 3-0 Vicryl. Then enterotomies were performed through which  another 60 white load of the da Michell stapler was used to create a  side-to-side functional end-to-side jejunojejunostomy. It was very  satisfactory and then 3-0 Stratafix was used to reapproximate the enterotomy sites  and the mesenteric defect was reapproximated using 3-0 vicryl sutures to prevent  any internal hernia. The Jet limb was then brought up to the upper stomach; The gastric pouch was created. First the gastrohepatic ligament was  taken down using Vessel sealer Scalpel and then a Black Ethicon stapler was  used to create a subtotal pouch Total of 2 staplers 60 each first black then blue, were used to  create the pouch after the NG-tube was removed. The pouch was very  satisfactory and then a posterior seromuscular gastrojejunostomy approximation  was performed and then 2 cm of enterostomy, jejunostomy and gastrostomy was  performed full thickness using 3-0 Stratafix, were used x2 to close the anastomosis  starting at the middle posterior wall approximating the anterior gastric pouch  with the jejunum 2 layers, one in partial thickness seromuscular, and the other in a full thickness layer, all the way to the corners right  and left and then the [place] technique was used to complete the  gastrojejunostomy. Another 2nd layer was performed using the 3-0  Stratafix.  The anastomosis was done completed after a 40 Fr bougie was passed through the pouch and into the Jet limb, and then the anastomosis was tested under water insufflation of the gastrojejunal anastomosis, and beautifully passed the test.  The right  supraumbilical 12 mm port, all other ports were 8 mm. The only 12 mm port  right supraumbilical was reapproximated using the Shahram-Pavithra fascial  closure device. The [facility] liver retractor was then removed uneventfully. The pneumoperitoneum was evacuated. The skin of all wounds  were approximated using 4-0 Vicryl in a running subcuticular fashion, followed  by Dermaflex skin glue after further more local anesthetic was injected. The  patient tolerated the procedure well without any complications, was extubated  in the OR and sent to the PACU in stable condition.      ____________________________________________    Zaida Reed MD, FACS, FICS    8/16/2021  12:01 PM

## 2021-08-16 NOTE — CONSULTS
[address] [facility]     PCP: Shala Carlos MD, MD    Date of Admission: 8/16/2021    Date of Service: Pt seen/examined on 8/16/21       Hx taken from patient    Reason for Consult: Medical management as needed  Consult from Dr. Sergio Mccann  History Of Present Illness: The patient is a 44 y.o. female chronic pancreatitis, lupus, fibromyalgia, thyroid disease, chronic back pain, anemia, depression, acid reflux, morbid obesity, sleep apnea, hypertension, COPD  Patient has a history of morbid obesity and comes in for Jet-en-Y gastric bypass. She states she has chronic pain. She also has seizures which she states were caused by uncontrolled pain. She also has COPD and depression. Following surgery she is feeling nauseous and having pain. Past Medical History:        Diagnosis Date    Acid reflux     Anemia     Anxiety     Arthritis     Hands, Back And Ankles    Bleeding ulcer 2014    \"I Had Ulcers In My Stomach And Colon\"/ per pt on 8/12/2019\"they said recently having some blood in my stomach- in July ( 2019)could not find where coming from \"    Bronchitis Last Episode 2014    Chronic back pain     Chronic pain     Sees Dr. Juan May COPD (chronic obstructive pulmonary disease) ([facility] [address]     Sees Dr. Rohith Malone Depression     Fibromyalgia Dx 2013    GERD (gastroesophageal reflux disease)     H/O echocardiogram 08/11/2015    EF >55%. LA to be at the upper limit of normal in size. LV hypertrophy with normal LV systolic, but abnormal diastolic function. Normal valvular structures and function.  H/O echocardiogram 08/30/2018    EF 55-60%    Hiatal hernia     History of blood transfusion 09/2015 And 2018    No Reaction To Blood Transfusions Received    History of sleeve gastrectomy     [place] (hard of hearing)     Right Ear    Hx of cardiac catheterization 10/24/2010    Angiographically normal coronary arteries w/ normal LV function and wall motion.     Hx of transesophageal echocardiography (PILO) for monitoring 12/02/2010    EF 55-60%. WNL. 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 name   Sweetie Morgan cysts     \"they found that I have a kidney cyst but not sure which side\" per pt on 7/15/2020    Lupus ([address] Dx 2013    \"Rheumatoid Lupus\"    MDRO (multiple drug resistant organisms) resistance     4 months ago chest from mediport    Morbid obesity ([address]     MRSA bacteremia     Nausea     \"Most Of The Time\"    Osteomyelitis of fifth toe of left foot ([address]     Pain management     Sees Dr. Helena Frost, Once A Month    Pancreatitis chronic Dx 2001    Pneumonia Dx 11-15    Seizures ([address]     Shortness of breath on exertion     Shortness of breath on exertion     Sleep apnea     Has CPAP\"no longer use the cpap since lost weight\"    Staph infection Dx 11-15    Left Foot    Staph infection Dx 11-15    \"Left Foot\"    Teeth missing     Upper And Lower    Thyroid disease     Wears glasses     To Read       Past Surgical History:        Procedure Laterality Date    APPENDECTOMY  02/1998    Done When Tubes And Ovaries Were Removed    CARDIAC CATHETERIZATION  10/24/2010    CATHETER REMOVAL N/A 7/16/2019    PORT REMOVAL performed by Dk Nicole MD at [address] [facility]  08/27/1991    CHOLECYSTECTOMY, LAPAROSCOPIC  1990's    COLONOSCOPY  Last Done In 2000's    DENTAL SURGERY      Teeth Extracted In Past    ENDOSCOPY, COLON, DIAGNOSTIC  Last Done In 2018    FOOT DEBRIDEMENT Left 6/16/2019    FIRST METATARSAL DEBRIDEMENT INCISION AND DRAINAGE. EXCISION OF ULCER.  RESECTION OF BONE. 1ST METATARSAL POWER LAVAGE AND BONE CEMENT performed by Rainer Wellington DPM at [facility] OR    FOOT SURGERY Left Last Done In 2016     Dr. Sylvester Jerome, \" About 6 Surgeries Done Because Of Staph Infection\"    HIATAL HERNIA REPAIR N/A 2/12/2019    HERNIA HIATAL LAPAROSCOPIC ROBOTIC performed by Sumi Gilbert MD at [address] [place] [address]  10/1997    Partial Abdominal Hysterectomy    INSERTION / REMOVAL / REPLACEMENT VENOUS ACCESS CATHETER N/A 8/15/2019    PORT INSERTION performed by Sumi Gilbert MD at [address] [facility]    removed after 6 months    LEG BIOPSY EXCISION Left 3/8/2021    LEFT THIGH LESION BIOPSY EXCISION performed by Sumi Gilbert MD at [facility], PARTIAL Left 2008    Benign    OTHER SURGICAL HISTORY  02/1998    \"Tubes And Ovaries Removed, Appendectomy Also Done\"    OTHER SURGICAL HISTORY  Last Done 7-15-16    Mediport Insertion \"Total Of Six Done, Removed Last Mediport In 2014\"    PORT SURGERY N/A 1/15/2020    PORT REMOVAL performed by Sumi Gilbert MD at [address] [place] [address] N/A 7/6/2020    PORT INSERTION performed by Sumi Gilbert MD at [address] [place] [address] Left 8/3/2021    MEDIPORT REPLACEMENT performed by Sumi Gilbert MD at [address] [facility] N/A 2/12/2019    GASTRECTOMY SLEEVE LAPAROSCOPIC ROBOTIC performed by Sumi Gilbert MD at [address] [place] [address]  08/27/2018    UPPER GASTROINTESTINAL ENDOSCOPY N/A 4/2/2019    EGD CONTROL HEMORRHAGE with epi injection at bleeding site performed by Sumi Gilbert MD at [address] [facility] 6/19/2019    EGD DIAGNOSTIC ONLY performed by Sumi Gilbert MD at [address] [facility] N/A 7/22/2019    EGD DIAGNOSTIC ONLY performed by Chaparro Humphrey MD at [address] [facility] 10/14/2019    EGD DIAGNOSTIC ONLY performed by Sumi Gilbert MD at [facility] N/A 7/17/2020    EGJ mouth daily  Patient taking differently: Take 0.5 mg by mouth nightly  5/13/21  Yes Sreedhar Holbrook MD   PARoxetine (PAXIL) 30 MG tablet Take 1.5 tablets by mouth nightly  Patient taking differently: Take 30 mg by mouth nightly  5/13/21  Yes Sreedhar Holbrook MD   betamethasone valerate (VALISONE) 0.1 % ointment APPLY OINTMENT TO AFFECTED AREA TWICE DAILY TO HANDS AND ELBOWS FOR 21 DAYS 3/26/21  Yes Historical Provider, MD   potassium gluconate 550 mg tablet Take 1 tablet by mouth daily 3/25/21  Yes Historical Provider, MD   tiZANidine (ZANAFLEX) 4 MG tablet Take 4 mg by mouth every 8 hours as needed  3/3/21  Yes Historical Provider, MD   ferrous sulfate (IRON 325) 325 (65 Fe) MG tablet Take 1 tablet by mouth daily (with breakfast) 10/23/20  Yes Chava Hair MD   Cholecalciferol (VITAMIN D3) 5000 units TABS Take 1 tablet by mouth daily   Yes Historical Provider, MD   Multiple Vitamin (MVI, BARIATRIC ADVANTAGE MULTI-FORMULA, CHEW TAB) Take 1 tablet by mouth daily 2/22/19  Yes Chava Hair MD   Calcium Citrate-Vitamin D (CALCIUM + VIT D, BARIATRIC ADVANTAGE, CHEWABLE TABLET) Take 1 tablet by mouth 2 times daily 2/22/19  Yes Chava Hair MD   gabapentin (NEURONTIN) 800 MG tablet Take 800 mg by mouth 3 times daily   Yes Historical Provider, MD   promethazine (PHENERGAN) 25 MG tablet TAKE 1 TABLET BY MOUTH EVERY 8 HOURS AS NEEDED FOR NAUSEA 7/10/21   Chava Hair MD   cholestyramine light 4 g packet Take 1 packet by mouth 3 times daily  Patient not taking: Reported on 7/30/2021 5/30/21   Fito Ames MD   cloNIDine (CATAPRES) 0.1 MG tablet Take 1 tablet by mouth 2 times daily  Patient taking differently: Take 0.1 mg by mouth 2 times daily as needed for High Blood Pressure 05/27/21 Patient states she only takes as needed usually if SBP>150 5/13/21   Sreedhar Holbrook MD   albuterol (PROVENTIL) (2.5 MG/3ML) 0.083% nebulizer solution Take 3 mLs by nebulization every 6 hours as needed for Wheezing 4/7/21 Michael Saldana, APRN - CNP   albuterol sulfate  (90 Base) MCG/ACT inhaler Inhale 2 puffs into the lungs 4 times daily 2/8/21   Eve Garcia MD       Allergies:    Aspirin, Shellfish-derived products, Toradol [ketorolac tromethamine], Haldol [haloperidol], and Reglan [metoclopramide]    Social History:    The patientcurrently lives at home. TOBACCO:   reports that she has never smoked. She has never used smokeless tobacco.  ETOH:   reports no history of alcohol use. Family History:  Positive as follows:        Problem Relation Age of Onset    Diabetes Mother         \"Borderline Diabetes\"    High Blood Pressure Mother     Obesity Mother     Arthritis Mother     Heart Disease Mother     High Cholesterol Mother     Vision Loss Mother     Diabetes Father     High Blood Pressure Father     Asthma Father     Cancer Father         prostate cancer    High Blood Pressure Brother     Asthma Son     Vision Loss Son     Lupus Daughter     Other Daughter         \"Alot Of Female Problems\"       REVIEW OF SYSTEMS:   Pertinent positives and negatives  as noted in the HPI and ROS. All other systems reviewed and negative. Review of Systems   Constitutional: Positive for chills. Negative for fever. HENT: Negative. Eyes:        Wears glasses     Respiratory: Negative. Cardiovascular: Negative. Gastrointestinal: Positive for abdominal pain and nausea. Endocrine:        Takes hormone pills     Genitourinary:        Feels like she has to urinate a lot     Musculoskeletal:        Takes muscle relaxants     Skin:        Occasionally has psoriasis on hands   Neurological: Positive for headaches. Hematological: Bruises/bleeds easily. Psychiatric/Behavioral: Negative.           PHYSICAL EXAM:  /69   Pulse 63   Temp 97.8 °F (36.6 °C) (Oral)   Resp 16   Ht 5' 4\" (1.626 m)   Wt 267 lb 12.8 oz (121.5 kg)   SpO2 98%   BMI 45.97 kg/m²     Physical Exam  Constitutional:       General: She is in acute distress (patient appears in mild discomfort). Comments: patient appears in mild discomfort   HENT:      Head: Normocephalic and atraumatic. Nose: No rhinorrhea. Mouth/Throat:      Mouth: Mucous membranes are moist.   Eyes:      General:         Right eye: No discharge. Left eye: No discharge. Conjunctiva/sclera: Conjunctivae normal.   Cardiovascular:      Rate and Rhythm: Normal rate and regular rhythm. Heart sounds: Normal heart sounds. No murmur heard. No friction rub. No gallop. Pulmonary:      Effort: Pulmonary effort is normal. No respiratory distress. Breath sounds: Normal breath sounds. No stridor. No wheezing, rhonchi or rales. Abdominal:      General: Abdomen is flat. Tenderness: There is abdominal tenderness. Comments: Surgical wounds on abdomen   Musculoskeletal:      Cervical back: No rigidity or tenderness. Lymphadenopathy:      Cervical: No cervical adenopathy. Skin:     General: Skin is warm and dry. Neurological:      General: No focal deficit present. Mental Status: She is alert. Mental status is at baseline. Cranial Nerves: No cranial nerve deficit. Psychiatric:         Mood and Affect: Mood normal.         Behavior: Behavior normal.      Comments: Appears to be in discomfort           FL LESS THAN 1 HOUR    Result Date: 8/3/2021  Radiology exam is complete. No Radiologist dictation. Please follow up with ordering provider. XR CHEST PORTABLE    Result Date: 8/3/2021  EXAMINATION: ONE XRAY VIEW OF THE CHEST 8/3/2021 2:36 pm COMPARISON: Chest 07/02/2021 HISTORY: ORDERING SYSTEM PROVIDED HISTORY: Left subclavian vein portacath placement TECHNOLOGIST PROVIDED HISTORY: Reason for exam:-> left SCV portacath placement Reason for Exam: Left SCV portacath placement FINDINGS: The cardiomediastinal and hilar silhouettes appear unremarkable. The lungs appear clear. No pleural effusion evident.  No pneumothorax is seen. No acute osseous abnormality is identified. Left subclavian vein Port-A-Cath is been placed, tip overlying the mid superior vena cava level, without any kinking evident. No radiographic evidence of acute cardiopulmonary disease. Left subclavian vein Port-A-Cath is been placed, tip overlying the mid superior vena cava level, without any kinking evident and no pneumothorax. IR CONTRAST INJECTION  EXISTING CV ACCESS DEVICE EVALUATION W FLUOROSC    Result Date: 7/30/2021  PROCEDURE: IR CONTRAST INJECTION  EXISTING CV ACCESS DEVICE EVALUATION W FLUOROSC 7/29/2021 HISTORY: ORDERING SYSTEM PROVIDED HISTORY: Poor intravenous access TECHNOLOGIST PROVIDED HISTORY: Reason for exam:->Malfunctioning Port, Unable to flush Is the patient pregnant?->No CONTRAST: 3 cc of Isovue 370 contrast was utilized. SEDATION: None FLUOROSCOPY DOSE AND TYPE OR TIME AND EXPOSURES: 0.2 minutes of fluoroscopic time was utilized. AK 20 mGy. DESCRIPTION OF PROCEDURE: Informed consent was obtained after a detailed explanation of the procedure including risks, benefits, and alternatives. Universal protocol was observed. The patient was placed in supine position on the angiographic table. The area overlying the port was prepped and draped in usual sterile fashion. A Bryant needle was utilized to access the port reservoir. A  film demonstrates that the catheter tip is within the superior vena cava. There is a loop within the subcutaneously tunneled portion of the catheter. There is mild pinch off of the catheter in the region of the costoclavicular ligament. There is marked obstruction to flow during the injection of contrast within the catheter. There is a small fibrin sheath noted at the catheter tip. There is no evidence of extravasation. The procedure was then terminated. Patient tolerated the entire procedure well without immediate complications. Amairani Soares  FINDINGS: Small fibrin sheath at the tip of the catheter which is within the superior vena cava. This would not explain the obstruction to injection however. Loop within the subcutaneously tunneled portion of the catheter. Mild pinch off of the catheter in the region of the costoclavicular ligament. Marked obstruction of flow during the injection of contrast within the catheter although no intrinsic obstruction within the catheter is identified. Fibrin sheath at the tip of the catheter which is within the superior vena cava. Loop within the subcutaneously tunneled portion of the catheter. Mild pinch off of the catheter in the region of the costoclavicular ligament. Marked obstruction of flow during the injection of contrast within the catheter although no intrinsic obstruction within the catheter is identified. The obstruction to injection of the port would not be caused by the fibrin sheath. Findings were discussed with Dr. Cristy Granados.  It is noted that the patient has a history of numerous prior chest port insertions. She may benefit from a right internal jugular vein access for a future port insertion. CBC   No results for input(s): WBC, HGB, HCT, PLT in the last 72 hours. RENAL  No results for input(s): NA, K, CL, CO2, PHOS, BUN, CREATININE in the last 72 hours. Invalid input(s): CA  LFT'S  No results for input(s): AST, ALT, ALB, BILIDIR, BILITOT, ALKPHOS in the last 72 hours. COAG  No results for input(s): INR in the last 72 hours. CARDIAC ENZYMES  No results for input(s): CKTOTAL, CKMB, CKMBINDEX, TROPONINI in the last 72 hours.     U/A:   Lab Results   Component Value Date    COLORU YELLOW 07/16/2021    WBCUA 1 07/16/2021    RBCUA <1 07/16/2021    MUCUS OCCASIONAL 07/16/2021    BACTERIA RARE 07/16/2021    CLARITYU CLEAR 07/16/2021    SPECGRAV 1.028 07/16/2021    LEUKOCYTESUR NEGATIVE 07/16/2021    BLOODU NEGATIVE 07/16/2021    AMORPHOUS RARE 07/10/2017       ABG  No results found for: EHM3RJP, BEART, P8DEOVBV, PHART, THGBART, PVJ0HLA, PO2ART, [place] Problems    Diagnosis Date Noted    Morbid obesity with BMI of 45.0-49.9, adult ([facility] [E66.01, Z68.42] 10/23/2020         ASSESSMENT/PLAN:  1. Seizures-resume Keppra  2. Morbid obesity s/p Jet-en-Y gastric bypass with extensive lysis of adhesions 8/16  3. Thyroid disease on levothyroxine  4. Depression-on Paxil  5. COPD-on albuterol. 6. Fibromyalgia-on gabapentin    7. Chronic pancreatitis  8. Lupus  9. Chronic back pain  10. Anemia  11.  BOY     Juanita Mohan MD

## 2021-08-16 NOTE — H&P
HISTORY AND PHYSICAL EXAM    Date of Admission:  (Not on file)    PCP:  Spenser Johnson MD, MD    Chief Complaint / History of Present Illness:  Mague Lowery is a very pleasant 44 y.o. female who presents today for her bariatric procedure. As noted her There is no height or weight on file to calculate BMI. with significant co-morbidities as below. The patient has been complying with the pre-operative workup, lifestyle modifications and changes with the bariatric clinic, including:  Dietitian evaluation and counseling, psychologist evaluation and counseling, Exercise physiologist evaluation and counseling, cardiac preoperative clearance and optimization, pulmonology preoperative clearance and optimization, her preoperative EGD for GERD, revealed no Hiatal Hernia. she complied with the protein liquid diet for 2 weeks and successfully lost 7-8 Lbs. . Pt reports pharmacist helped her get protein shakes at the store. Was given Ensure plus with 350 kcals each. Was using 3x/day. Pt lost 1.9 lbs. Still has 4 days prior to surgery. Instructed pt again on calorie/protein requirements for acceptable shakes - less than 200 kcals, more than 15 gms protein. Pt will purchase different shakes and use up until surgery on Monday.      The patient is a 44 y.o. female being seen for morbid obesity, considering revision weight loss surgery; Robbin's, Height: 5' 4.25\" (163.2 cm), Weight: 270 lb 12.8 oz (122.8 kg), Current Body mass index is 46.12 kg/m². The patient's PCP is Spenser Johnson MD, MD Lbs; will proceed today with robotic laparoscopic RYGB. All risks and benefits, potential complications and possible alternatives discussed, and agreeable to proceed.     PMH:   has a past medical history of Acid reflux, Anemia, Anxiety, Arthritis, Bleeding ulcer (2014), Bronchitis (Last Episode 2014), Chronic back pain, Chronic pain, COPD (chronic obstructive pulmonary disease) ([address] Depression, Fibromyalgia (Dx ), GERD (gastroesophageal reflux disease), H/O echocardiogram (2015), H/O echocardiogram (2018), Hiatal hernia, History of blood transfusion (2015 And ), History of sleeve gastrectomy, [place] (hard of hearing), cardiac catheterization (10/24/2010), transesophageal echocardiography (PILO) for monitoring (2010), OTHER MEDICAL, OTHER MEDICAL, Hypertension, Kidney cysts, Lupus ([address] (Dx ), MDRO (multiple drug resistant organisms) resistance, Morbid obesity ([address] MRSA bacteremia, Nausea, Osteomyelitis of fifth toe of left foot ([address] Pain management, Pancreatitis chronic (Dx ), Pneumonia (Dx -15), Seizures ([address] Shortness of breath on exertion, Shortness of breath on exertion, Sleep apnea, Staph infection (Dx 11-15), Staph infection (Dx 11-15), Teeth missing, Thyroid disease, and Wears glasses. PSH:   has a past surgical history that includes Lung removal, partial (Left, );  section (1991); Foot surgery (Left, Last Done In ); Dental surgery; Cholecystectomy, laparoscopic (0593'N); other surgical history (1998); other surgical history (Last Done 7-15-16); Tonsillectomy and adenoidectomy (); Appendectomy (1998); Upper gastrointestinal endoscopy (2018); Lap Band (~); Hysterectomy (10/1997); Endoscopy, colon, diagnostic (Last Done In 2018); Colonoscopy (Last Done In 's); Cardiac catheterization (10/24/2010); Sleeve Gastrectomy (N/A, 2019); hiatal hernia repair (N/A, 2019); Upper gastrointestinal endoscopy (N/A, 2019); Foot Debridement (Left, 2019); Upper gastrointestinal endoscopy (N/A, 2019); Catheter Removal (N/A, 2019); Upper gastrointestinal endoscopy (N/A, 2019); INSERTION / REMOVAL / REPLACEMENT VENOUS ACCESS CATHETER (N/A, 8/15/2019); Upper gastrointestinal endoscopy (N/A, 10/14/2019); [facility] Surgery (N/A, 1/15/2020); [facility] Surgery (N/A, 2020); Upper gastrointestinal endoscopy (N/A, 2020);  Leg biopsy excision (Left, 3/8/2021); Upper gastrointestinal endoscopy (N/A, 5/28/2021); and Port Surgery (Left, 8/3/2021). Allergies: Allergies   Allergen Reactions    Aspirin Palpitations     \"My Heart Rate Elevates\"    Shellfish-Derived Products Swelling    Toradol [Ketorolac Tromethamine] Rash    Compazine [Prochlorperazine]     Haldol [Haloperidol]      Shaking      Reglan [Metoclopramide] Itching        Home Meds:    Prior to Admission medications    Medication Sig Start Date End Date Taking?  Authorizing Provider   hydrOXYzine (VISTARIL) 50 MG capsule TAKE 1 CAPSULE BY MOUTH EVERY 6 HOURS AS NEEDED FOR ANXIETY 7/28/21   Spenser Johnson MD   pantoprazole (PROTONIX) 40 MG tablet Take 1 tablet by mouth daily (with breakfast) 7/16/21   Sherren Earls Jolliff, PA-C   promethazine (PHENERGAN) 25 MG tablet TAKE 1 TABLET BY MOUTH EVERY 8 HOURS AS NEEDED FOR NAUSEA 7/10/21   Truong Roberts MD   SM SENNA-S 8.6-50 MG per tablet  7/5/21   Historical Provider, MD   scopolamine (TRANSDERM-SCOP) transdermal patch APPLY 1 PATCH TRANSDERMALLY TO THE SKIN BEHIND THE EAR ONCE EVERY 3 DAYS AS NEEDED 7/4/21   Tika Kiser PA-C   ondansetron (ZOFRAN ODT) 4 MG disintegrating tablet Take 1 tablet by mouth every 8 hours as needed for Nausea 7/2/21   Rollo Gunning, DO   ondansetron (ZOFRAN ODT) 4 MG disintegrating tablet Take 1 tablet by mouth every 8 hours as needed for Nausea 6/17/21   Rollo Gunning, DO   cholestyramine light 4 g packet Take 1 packet by mouth 3 times daily  Patient not taking: Reported on 7/30/2021 5/30/21   Agustina Lopez MD   Melatonin 10 MG TABS Take 10-20 mg by mouth nightly as needed    Historical Provider, MD   Cyanocobalamin (VITAMIN B 12 PO) Take 1 tablet by mouth daily    Historical Provider, MD   meclizine (ANTIVERT) 25 MG tablet Take 25 mg by mouth 3 times daily as needed for Dizziness    Historical Provider, MD   dicyclomine (BENTYL) 10 MG capsule Take 1 capsule by mouth 3 times daily Calcium Citrate-Vitamin D (CALCIUM + VIT D, BARIATRIC ADVANTAGE, CHEWABLE TABLET) Take 1 tablet by mouth 2 times daily 2/22/19   Renae Hill MD   gabapentin (NEURONTIN) 800 MG tablet Take 800 mg by mouth 3 times daily    Historical Provider, MD        [facility] Meds:    No current facility-administered medications for this encounter.      Current Outpatient Medications   Medication Sig Dispense Refill    hydrOXYzine (VISTARIL) 50 MG capsule TAKE 1 CAPSULE BY MOUTH EVERY 6 HOURS AS NEEDED FOR ANXIETY 60 capsule 0    pantoprazole (PROTONIX) 40 MG tablet Take 1 tablet by mouth daily (with breakfast) 30 tablet 0    promethazine (PHENERGAN) 25 MG tablet TAKE 1 TABLET BY MOUTH EVERY 8 HOURS AS NEEDED FOR NAUSEA 30 tablet 0    SM SENNA-S 8.6-50 MG per tablet       scopolamine (TRANSDERM-SCOP) transdermal patch APPLY 1 PATCH TRANSDERMALLY TO THE SKIN BEHIND THE EAR ONCE EVERY 3 DAYS AS NEEDED 10 patch 0    ondansetron (ZOFRAN ODT) 4 MG disintegrating tablet Take 1 tablet by mouth every 8 hours as needed for Nausea 15 tablet 0    ondansetron (ZOFRAN ODT) 4 MG disintegrating tablet Take 1 tablet by mouth every 8 hours as needed for Nausea 15 tablet 0    cholestyramine light 4 g packet Take 1 packet by mouth 3 times daily (Patient not taking: Reported on 7/30/2021) 60 packet 3    Melatonin 10 MG TABS Take 10-20 mg by mouth nightly as needed      Cyanocobalamin (VITAMIN B 12 PO) Take 1 tablet by mouth daily      meclizine (ANTIVERT) 25 MG tablet Take 25 mg by mouth 3 times daily as needed for Dizziness      dicyclomine (BENTYL) 10 MG capsule Take 1 capsule by mouth 3 times daily as needed (abdominal pain) 21 capsule 3    levETIRAcetam (KEPPRA) 1000 MG tablet Take 1 tablet by mouth 2 times daily 60 tablet 3    levothyroxine (SYNTHROID) 125 MCG tablet Take 1 tablet by mouth Daily 30 tablet 3    atenolol (TENORMIN) 25 MG tablet Take 1 tablet by mouth daily 30 tablet 3    cloNIDine (CATAPRES) 0.1 MG tablet Take 1 tablet by mouth 2 times daily (Patient taking differently: Take 0.1 mg by mouth 2 times daily as needed for High Blood Pressure 05/27/21 Patient states she only takes as needed usually if SBP>150) 60 tablet 3    estradiol (ESTRACE) 0.5 MG tablet Take 1 tablet by mouth daily (Patient taking differently: Take 0.5 mg by mouth nightly ) 21 tablet 3    PARoxetine (PAXIL) 30 MG tablet Take 1.5 tablets by mouth nightly (Patient taking differently: Take 30 mg by mouth nightly ) 30 tablet 3    albuterol (PROVENTIL) (2.5 MG/3ML) 0.083% nebulizer solution Take 3 mLs by nebulization every 6 hours as needed for Wheezing 120 each 0    betamethasone valerate (VALISONE) 0.1 % ointment APPLY OINTMENT TO AFFECTED AREA TWICE DAILY TO HANDS AND ELBOWS FOR 21 DAYS      potassium gluconate 550 mg tablet Take 1 tablet by mouth daily      tiZANidine (ZANAFLEX) 4 MG tablet Take 4 mg by mouth every 8 hours as needed       albuterol sulfate  (90 Base) MCG/ACT inhaler Inhale 2 puffs into the lungs 4 times daily 1 Inhaler 5    ferrous sulfate (IRON 325) 325 (65 Fe) MG tablet Take 1 tablet by mouth daily (with breakfast) 90 tablet 5    Cholecalciferol (VITAMIN D3) 5000 units TABS Take 1 tablet by mouth daily      Multiple Vitamin (MVI, BARIATRIC ADVANTAGE MULTI-FORMULA, CHEW TAB) Take 1 tablet by mouth daily 30 tablet 5    Calcium Citrate-Vitamin D (CALCIUM + VIT D, BARIATRIC ADVANTAGE, CHEWABLE TABLET) Take 1 tablet by mouth 2 times daily 120 tablet 5    gabapentin (NEURONTIN) 800 MG tablet Take 800 mg by mouth 3 times daily         Social History / Family History:        Social History     Socioeconomic History    Marital status:      Spouse name: Not on file    Number of children: Not on file    Years of education: Not on file    Highest education level: Not on file   Occupational History    Not on file   Tobacco Use    Smoking status: Never Smoker    Smokeless tobacco: Never Used   Vaping Use    Psychiatric/Behavioral: Negative for agitation. Physical Exam:  Vital Signs: There were no vitals filed for this visit. General appearance: Pt Alert and oriented, in no apparent acute distress. HEENT:  KATE, EOMI, No JVDs, Bruits, Megalies. Lungs: Clear to auscultation bilaterally. Heart: Regular rate and rhythm, S1, S2 normal, no murmur, rub or gallop. Abdomen: Non tender. Non distended. Positive bowel sounds. No hernias noted, no masses palpable. Extremities: Warm, well perfused, no cyanosis or edema. Skin: Skin color, texture, turgor normal.  Neurologic: Grossly normal, Cranial nerves from II to XII intact. Radiologic / Imaging / TESTING  FL LESS THAN 1 HOUR    Result Date: 8/3/2021  Radiology exam is complete. No Radiologist dictation. Please follow up with ordering provider. XR CHEST PORTABLE    Result Date: 8/3/2021  EXAMINATION: ONE XRAY VIEW OF THE CHEST 8/3/2021 2:36 pm COMPARISON: Chest 07/02/2021 HISTORY: ORDERING SYSTEM PROVIDED HISTORY: Left subclavian vein portacath placement TECHNOLOGIST PROVIDED HISTORY: Reason for exam:-> left SCV portacath placement Reason for Exam: Left SCV portacath placement FINDINGS: The cardiomediastinal and hilar silhouettes appear unremarkable. The lungs appear clear. No pleural effusion evident. No pneumothorax is seen. No acute osseous abnormality is identified. Left subclavian vein Port-A-Cath is been placed, tip overlying the mid superior vena cava level, without any kinking evident. No radiographic evidence of acute cardiopulmonary disease. Left subclavian vein Port-A-Cath is been placed, tip overlying the mid superior vena cava level, without any kinking evident and no pneumothorax.      IR CONTRAST INJECTION  EXISTING CV ACCESS DEVICE EVALUATION W FLUOROSC    Result Date: 7/30/2021  PROCEDURE: IR CONTRAST INJECTION  EXISTING CV ACCESS DEVICE EVALUATION W FLUOROSC 7/29/2021 HISTORY: ORDERING SYSTEM PROVIDED HISTORY: Poor intravenous access TECHNOLOGIST PROVIDED HISTORY: Reason for exam:->Malfunctioning Port, Unable to flush Is the patient pregnant?->No CONTRAST: 3 cc of Isovue 370 contrast was utilized. SEDATION: None FLUOROSCOPY DOSE AND TYPE OR TIME AND EXPOSURES: 0.2 minutes of fluoroscopic time was utilized. AK 20 mGy. DESCRIPTION OF PROCEDURE: Informed consent was obtained after a detailed explanation of the procedure including risks, benefits, and alternatives. Universal protocol was observed. The patient was placed in supine position on the angiographic table. The area overlying the port was prepped and draped in usual sterile fashion. A Bryant needle was utilized to access the port reservoir. A  film demonstrates that the catheter tip is within the superior vena cava. There is a loop within the subcutaneously tunneled portion of the catheter. There is mild pinch off of the catheter in the region of the costoclavicular ligament. There is marked obstruction to flow during the injection of contrast within the catheter. There is a small fibrin sheath noted at the catheter tip. There is no evidence of extravasation. The procedure was then terminated. Patient tolerated the entire procedure well without immediate complications. Colleen Cruz FINDINGS: Small fibrin sheath at the tip of the catheter which is within the superior vena cava. This would not explain the obstruction to injection however. Loop within the subcutaneously tunneled portion of the catheter. Mild pinch off of the catheter in the region of the costoclavicular ligament. Marked obstruction of flow during the injection of contrast within the catheter although no intrinsic obstruction within the catheter is identified. Fibrin sheath at the tip of the catheter which is within the superior vena cava. Loop within the subcutaneously tunneled portion of the catheter. Mild pinch off of the catheter in the region of the costoclavicular ligament.  Marked obstruction of flow during the injection of contrast within the catheter although no intrinsic obstruction within the catheter is identified. The obstruction to injection of the port would not be caused by the fibrin sheath. Findings were discussed with Dr. Kim Drake.  It is noted that the patient has a history of numerous prior chest port insertions. She may benefit from a right internal jugular vein access for a future port insertion. Labs:    No results found for this or any previous visit (from the past 24 hour(s)). Diagnosis:  Patient Active Problem List   Diagnosis    Fibromyalgia    Lupus (systemic lupus erythematosus) (HCC)    Chronic pancreatitis (HCC)    HTN (hypertension)    Frequent UTI    Gastroesophageal reflux disease without esophagitis    Chronic depression    Fatty liver disease, nonalcoholic    Arthritis    Bilateral low back pain with left-sided sciatica    Hiatal hernia    Status post laparoscopic sleeve gastrectomy    Pseudoseizure    Chronic pain syndrome    Drug-seeking/Aberrant behavior    Lymph node disorder    Morbid obesity with BMI of 40.0-44.9, adult (HCC)    Iron deficiency anemia    Seizure disorder (HCC)    Anxiety    RUQ pain    Status post bariatric surgery    Morbid obesity with BMI of 45.0-49.9, adult ([address]    Discoloration of skin of flank resembling ecchymosis    Morbid obesity with BMI of 50.0-59.9, adult ([address]    Chest pain of uncertain etiology    Cellulitis of left thigh    Malabsorption    COPD (chronic obstructive pulmonary disease) ([address]    Hypothyroidism due to acquired atrophy of thyroid    Osteopenia    Vitamin D deficiency    Irritable bowel syndrome    Malabsorption due to intolerance, not elsewhere classified    Port-A-Cath in place           Assessment & Plan:    Ana Paula Lincoln is a very pleasant 44 y.o. female who presents today for her bariatric procedure. As noted her There is no height or weight on file to calculate BMI.  with

## 2021-08-17 ENCOUNTER — APPOINTMENT (OUTPATIENT)
Dept: GENERAL RADIOLOGY | Age: 51
DRG: 620 | End: 2021-08-17
Attending: SURGERY
Payer: COMMERCIAL

## 2021-08-17 LAB
ANION GAP SERPL CALCULATED.3IONS-SCNC: 7 MMOL/L (ref 4–16)
BASOPHILS ABSOLUTE: 0 K/CU MM
BASOPHILS RELATIVE PERCENT: 0.5 % (ref 0–1)
BUN BLDV-MCNC: 13 MG/DL (ref 6–23)
CALCIUM SERPL-MCNC: 9.2 MG/DL (ref 8.3–10.6)
CHLORIDE BLD-SCNC: 104 MMOL/L (ref 99–110)
CO2: 26 MMOL/L (ref 21–32)
CREAT SERPL-MCNC: 0.7 MG/DL (ref 0.6–1.1)
DIFFERENTIAL TYPE: ABNORMAL
EOSINOPHILS ABSOLUTE: 0.2 K/CU MM
EOSINOPHILS RELATIVE PERCENT: 4 % (ref 0–3)
GFR AFRICAN AMERICAN: >60 ML/MIN/1.73M2
GFR NON-AFRICAN AMERICAN: >60 ML/MIN/1.73M2
GLUCOSE BLD-MCNC: 112 MG/DL (ref 70–99)
HCT VFR BLD CALC: 32.8 % (ref 37–47)
HEMOGLOBIN: 10.3 GM/DL (ref 12.5–16)
IMMATURE NEUTROPHIL %: 0.5 % (ref 0–0.43)
LYMPHOCYTES ABSOLUTE: 0.8 K/CU MM
LYMPHOCYTES RELATIVE PERCENT: 18.4 % (ref 24–44)
MCH RBC QN AUTO: 26.8 PG (ref 27–31)
MCHC RBC AUTO-ENTMCNC: 31.4 % (ref 32–36)
MCV RBC AUTO: 85.2 FL (ref 78–100)
MONOCYTES ABSOLUTE: 0.3 K/CU MM
MONOCYTES RELATIVE PERCENT: 7.3 % (ref 0–4)
NUCLEATED RBC %: 0 %
PDW BLD-RTO: 14.9 % (ref 11.7–14.9)
PLATELET # BLD: 168 K/CU MM (ref 140–440)
PMV BLD AUTO: 9.5 FL (ref 7.5–11.1)
POTASSIUM SERPL-SCNC: 4.1 MMOL/L (ref 3.5–5.1)
RBC # BLD: 3.85 M/CU MM (ref 4.2–5.4)
SEGMENTED NEUTROPHILS ABSOLUTE COUNT: 2.9 K/CU MM
SEGMENTED NEUTROPHILS RELATIVE PERCENT: 69.3 % (ref 36–66)
SODIUM BLD-SCNC: 137 MMOL/L (ref 135–145)
TOTAL IMMATURE NEUTOROPHIL: 0.02 K/CU MM
TOTAL NUCLEATED RBC: 0 K/CU MM
WBC # BLD: 4.2 K/CU MM (ref 4–10.5)

## 2021-08-17 PROCEDURE — C9113 INJ PANTOPRAZOLE SODIUM, VIA: HCPCS | Performed by: SURGERY

## 2021-08-17 PROCEDURE — 1200000000 HC SEMI PRIVATE

## 2021-08-17 PROCEDURE — APPNB180 APP NON BILLABLE TIME > 60 MINS: Performed by: NURSE PRACTITIONER

## 2021-08-17 PROCEDURE — 2500000003 HC RX 250 WO HCPCS: Performed by: SURGERY

## 2021-08-17 PROCEDURE — 6360000002 HC RX W HCPCS: Performed by: SURGERY

## 2021-08-17 PROCEDURE — 74240 X-RAY XM UPR GI TRC 1CNTRST: CPT

## 2021-08-17 PROCEDURE — 6370000000 HC RX 637 (ALT 250 FOR IP): Performed by: SURGERY

## 2021-08-17 PROCEDURE — 94761 N-INVAS EAR/PLS OXIMETRY MLT: CPT

## 2021-08-17 PROCEDURE — 43644 LAP GASTRIC BYPASS/ROUX-EN-Y: CPT | Performed by: NURSE PRACTITIONER

## 2021-08-17 PROCEDURE — 80048 BASIC METABOLIC PNL TOTAL CA: CPT

## 2021-08-17 PROCEDURE — 2580000003 HC RX 258: Performed by: SURGERY

## 2021-08-17 PROCEDURE — 85025 COMPLETE CBC W/AUTO DIFF WBC: CPT

## 2021-08-17 PROCEDURE — 99024 POSTOP FOLLOW-UP VISIT: CPT | Performed by: SURGERY

## 2021-08-17 PROCEDURE — 6370000000 HC RX 637 (ALT 250 FOR IP): Performed by: HOSPITALIST

## 2021-08-17 PROCEDURE — 6370000000 HC RX 637 (ALT 250 FOR IP): Performed by: INTERNAL MEDICINE

## 2021-08-17 RX ORDER — TIZANIDINE 4 MG/1
4 TABLET ORAL EVERY 8 HOURS PRN
Status: DISCONTINUED | OUTPATIENT
Start: 2021-08-17 | End: 2021-08-18 | Stop reason: HOSPADM

## 2021-08-17 RX ORDER — HYDROXYZINE PAMOATE 25 MG/1
50 CAPSULE ORAL EVERY 6 HOURS PRN
Status: DISCONTINUED | OUTPATIENT
Start: 2021-08-17 | End: 2021-08-18 | Stop reason: HOSPADM

## 2021-08-17 RX ADMIN — HYDROMORPHONE HYDROCHLORIDE 1.5 MG: 2 INJECTION, SOLUTION INTRAMUSCULAR; INTRAVENOUS; SUBCUTANEOUS at 17:25

## 2021-08-17 RX ADMIN — ONDANSETRON 4 MG: 2 INJECTION INTRAMUSCULAR; INTRAVENOUS at 21:07

## 2021-08-17 RX ADMIN — TIZANIDINE 4 MG: 4 TABLET ORAL at 13:14

## 2021-08-17 RX ADMIN — ONDANSETRON 4 MG: 2 INJECTION INTRAMUSCULAR; INTRAVENOUS at 11:00

## 2021-08-17 RX ADMIN — POTASSIUM CHLORIDE, DEXTROSE MONOHYDRATE AND SODIUM CHLORIDE: 150; 5; 450 INJECTION, SOLUTION INTRAVENOUS at 01:32

## 2021-08-17 RX ADMIN — HYDROMORPHONE HYDROCHLORIDE 1.5 MG: 2 INJECTION, SOLUTION INTRAMUSCULAR; INTRAVENOUS; SUBCUTANEOUS at 08:21

## 2021-08-17 RX ADMIN — PANTOPRAZOLE SODIUM 40 MG: 40 INJECTION, POWDER, FOR SOLUTION INTRAVENOUS at 05:39

## 2021-08-17 RX ADMIN — LEVOTHYROXINE SODIUM 125 MCG: 0.12 TABLET ORAL at 11:58

## 2021-08-17 RX ADMIN — POTASSIUM CHLORIDE, DEXTROSE MONOHYDRATE AND SODIUM CHLORIDE: 150; 5; 450 INJECTION, SOLUTION INTRAVENOUS at 21:06

## 2021-08-17 RX ADMIN — LEVETIRACETAM 1000 MG: 500 TABLET, FILM COATED ORAL at 09:17

## 2021-08-17 RX ADMIN — GABAPENTIN 800 MG: 400 CAPSULE ORAL at 09:16

## 2021-08-17 RX ADMIN — SODIUM CHLORIDE, PRESERVATIVE FREE 10 ML: 5 INJECTION INTRAVENOUS at 21:07

## 2021-08-17 RX ADMIN — PROMETHAZINE HYDROCHLORIDE 12.5 MG: 25 INJECTION INTRAMUSCULAR; INTRAVENOUS at 08:24

## 2021-08-17 RX ADMIN — PANTOPRAZOLE SODIUM 40 MG: 40 INJECTION, POWDER, FOR SOLUTION INTRAVENOUS at 18:32

## 2021-08-17 RX ADMIN — METRONIDAZOLE 500 MG: 500 INJECTION, SOLUTION INTRAVENOUS at 13:07

## 2021-08-17 RX ADMIN — POTASSIUM CHLORIDE, DEXTROSE MONOHYDRATE AND SODIUM CHLORIDE: 150; 5; 450 INJECTION, SOLUTION INTRAVENOUS at 12:02

## 2021-08-17 RX ADMIN — PROMETHAZINE HYDROCHLORIDE 12.5 MG: 25 INJECTION INTRAMUSCULAR; INTRAVENOUS at 17:25

## 2021-08-17 RX ADMIN — HYDROMORPHONE HYDROCHLORIDE 1.5 MG: 2 INJECTION, SOLUTION INTRAMUSCULAR; INTRAVENOUS; SUBCUTANEOUS at 11:07

## 2021-08-17 RX ADMIN — ONDANSETRON 4 MG: 2 INJECTION INTRAMUSCULAR; INTRAVENOUS at 04:50

## 2021-08-17 RX ADMIN — GABAPENTIN 800 MG: 400 CAPSULE ORAL at 21:07

## 2021-08-17 RX ADMIN — CEFAZOLIN SODIUM 3000 MG: 1 INJECTION, POWDER, FOR SOLUTION INTRAMUSCULAR; INTRAVENOUS at 04:50

## 2021-08-17 RX ADMIN — GABAPENTIN 800 MG: 400 CAPSULE ORAL at 14:57

## 2021-08-17 RX ADMIN — ENOXAPARIN SODIUM 40 MG: 40 INJECTION SUBCUTANEOUS at 09:15

## 2021-08-17 RX ADMIN — ONDANSETRON 4 MG: 2 INJECTION INTRAMUSCULAR; INTRAVENOUS at 18:33

## 2021-08-17 RX ADMIN — ONDANSETRON 4 MG: 2 INJECTION INTRAMUSCULAR; INTRAVENOUS at 14:57

## 2021-08-17 RX ADMIN — LEVETIRACETAM 1000 MG: 500 TABLET, FILM COATED ORAL at 21:07

## 2021-08-17 RX ADMIN — HYDROMORPHONE HYDROCHLORIDE 1.5 MG: 2 INJECTION, SOLUTION INTRAMUSCULAR; INTRAVENOUS; SUBCUTANEOUS at 21:07

## 2021-08-17 RX ADMIN — METRONIDAZOLE 500 MG: 500 INJECTION, SOLUTION INTRAVENOUS at 04:35

## 2021-08-17 RX ADMIN — PROMETHAZINE HYDROCHLORIDE 12.5 MG: 25 INJECTION INTRAMUSCULAR; INTRAVENOUS at 01:20

## 2021-08-17 RX ADMIN — LORAZEPAM 1 MG: 2 INJECTION INTRAMUSCULAR; INTRAVENOUS at 21:06

## 2021-08-17 RX ADMIN — HYDROMORPHONE HYDROCHLORIDE 1.5 MG: 2 INJECTION, SOLUTION INTRAMUSCULAR; INTRAVENOUS; SUBCUTANEOUS at 01:20

## 2021-08-17 RX ADMIN — HYDROMORPHONE HYDROCHLORIDE 1.5 MG: 2 INJECTION, SOLUTION INTRAMUSCULAR; INTRAVENOUS; SUBCUTANEOUS at 13:06

## 2021-08-17 RX ADMIN — HYDROMORPHONE HYDROCHLORIDE 1.5 MG: 2 INJECTION, SOLUTION INTRAMUSCULAR; INTRAVENOUS; SUBCUTANEOUS at 04:35

## 2021-08-17 ASSESSMENT — PAIN DESCRIPTION - PROGRESSION
CLINICAL_PROGRESSION: GRADUALLY WORSENING

## 2021-08-17 ASSESSMENT — PAIN DESCRIPTION - FREQUENCY
FREQUENCY: CONTINUOUS

## 2021-08-17 ASSESSMENT — PAIN DESCRIPTION - ORIENTATION
ORIENTATION: LEFT
ORIENTATION: LOWER;MID
ORIENTATION: MID;LOWER
ORIENTATION: LOWER;MID

## 2021-08-17 ASSESSMENT — PAIN DESCRIPTION - DESCRIPTORS
DESCRIPTORS: ACHING
DESCRIPTORS: SHARP
DESCRIPTORS: ACHING
DESCRIPTORS: ACHING

## 2021-08-17 ASSESSMENT — PAIN SCALES - GENERAL
PAINLEVEL_OUTOF10: 8
PAINLEVEL_OUTOF10: 0
PAINLEVEL_OUTOF10: 8
PAINLEVEL_OUTOF10: 8
PAINLEVEL_OUTOF10: 2
PAINLEVEL_OUTOF10: 4
PAINLEVEL_OUTOF10: 8
PAINLEVEL_OUTOF10: 8

## 2021-08-17 ASSESSMENT — PAIN DESCRIPTION - PAIN TYPE
TYPE: SURGICAL PAIN

## 2021-08-17 ASSESSMENT — PAIN DESCRIPTION - LOCATION
LOCATION: ABDOMEN

## 2021-08-17 ASSESSMENT — PAIN DESCRIPTION - ONSET
ONSET: ON-GOING

## 2021-08-17 NOTE — PROGRESS NOTES
BARIATRIC SURGERY PROGRESS NOTE                Vitals:    08/16/21 1715 08/16/21 1737 08/16/21 2014 08/17/21 0406   BP: (!) 142/80 128/69 (!) 141/96 115/70   Pulse: 70 63 59 59   Resp: 16 16 20 18   Temp: 97 °F (36.1 °C) 97.8 °F (36.6 °C) 97.7 °F (36.5 °C) 97.5 °F (36.4 °C)   TempSrc: Temporal Oral Oral Oral   SpO2: 98% 98% 100% 95%   Weight:    292 lb 5.3 oz (132.6 kg)   Height:         I/O last 3 completed shifts: In: 1800 [I.V.:1800]  Out: 260 [Urine:250; Blood:10]  No intake/output data recorded.     Diet NPO Exceptions are: Sips of Water with Meds    Recent Results (from the past 48 hour(s))   CBC auto differential    Collection Time: 08/17/21  5:41 AM   Result Value Ref Range    WBC 4.2 4.0 - 10.5 K/CU MM    RBC 3.85 (L) 4.2 - 5.4 M/CU MM    Hemoglobin 10.3 (L) 12.5 - 16.0 GM/DL    Hematocrit 32.8 (L) 37 - 47 %    MCV 85.2 78 - 100 FL    MCH 26.8 (L) 27 - 31 PG    MCHC 31.4 (L) 32.0 - 36.0 %    RDW 14.9 11.7 - 14.9 %    Platelets 881 422 - 023 K/CU MM    MPV 9.5 7.5 - 11.1 FL    Differential Type AUTOMATED DIFFERENTIAL     Segs Relative 69.3 (H) 36 - 66 %    Lymphocytes % 18.4 (L) 24 - 44 %    Monocytes % 7.3 (H) 0 - 4 %    Eosinophils % 4.0 (H) 0 - 3 %    Basophils % 0.5 0 - 1 %    Segs Absolute 2.9 K/CU MM    Lymphocytes Absolute 0.8 K/CU MM    Monocytes Absolute 0.3 K/CU MM    Eosinophils Absolute 0.2 K/CU MM    Basophils Absolute 0.0 K/CU MM    Nucleated RBC % 0.0 %    Total Nucleated RBC 0.0 K/CU MM    Total Immature Neutrophil 0.02 K/CU MM    Immature Neutrophil % 0.5 (H) 0 - 0.43 %       Scheduled Meds:   albuterol sulfate HFA  2 puff Inhalation 4x Daily    atenolol  25 mg Oral Daily    levothyroxine  125 mcg Oral Daily    PARoxetine  30 mg Oral Nightly    sodium chloride flush  10 mL Intravenous 2 times per day    metroNIDAZOLE  500 mg Intravenous Q8H    enoxaparin  40 mg Subcutaneous Daily    pantoprazole  40 mg Intravenous BID    ondansetron  4 mg Intravenous Q4H    levETIRAcetam 1,000 mg Oral BID    gabapentin  800 mg Oral TID     Continuous Infusions:   dextrose 5% and 0.45% NaCl with KCl 20 mEq 125 mL/hr at 08/17/21 0640    sodium chloride         Physical Exam:  HEENT: Anicteric sclerae, Oropharyngeal mucosae moist, pink and intact. Heart:  Normal S1 and S2, RRR  Lungs: Clear to auscultation bilaterally, No audible Wheezes or Rales. Extremities: No edema. Neuro: Alert and Oriented x 3, Non focal.  Abdomen: Soft, Benign, Non tender, Non distended, Positive bowel sounds. Incision: Nicely healing: No erythema, No discharge. Active Problems: Morbid obesity with BMI of 45.0-49.9, adult ([facility]  Resolved Problems:    * No resolved hospital problems. *      Assessment and Plan:  Jono Prater is a 44 y.o. female who is POD # 1 status post :  PPROCEDURE DONE: Laparoscopic robotic-assisted Jet-en-Y gastric bypass,  antecolic-antegastric gastrojejunal anastomosis was performed, 50 cm biliary pancreatic limb, 120 cm Jet limb, the jejunojejunostomy was performed with a 60 mm linear stapler, 2 layers, the gastrojejunal anastomosis was performed with a 60 mm linear stapler, handsewn 2 layers+ Laparoscopic EXTENSIVE lysis of adhesions BEFORE the procedure was started.     Pain and nausea under adequate control. Today pending gastrografin upper GI to r/o stricture, r/o leaks. START:    Bariatric stage I: Clears  then advance to stage II Full liquid diet: no sugar added    UP TO 4 Oz every 15 min, and over 15 min each. Increase Ambulation to at least 4x/day walk in the hallways with assistance. Respiratory cha-operative care: Incentive Spirometry / deep breathing and coughing 10x/hr while awake. Continue DVT prophylaxis with Teds and SCDs and SC Lovenox. Continue GI prophylaxis with Protonix IV till able to tolerate PO. Discharge WHEN tolerates 32 oz / 1000 mL of liquids.   Goal 40-50 gm of protein / day shlia.     ___________________________________________    Wai Llamas MD, FACS, FICS  8/17/2021  6:47 AM

## 2021-08-17 NOTE — PROGRESS NOTES
hospitalist consulted as pt is refusing to take po meds. We would like the keppra to be changed to iv if possible. Waiting for response.

## 2021-08-17 NOTE — PROGRESS NOTES
Hospitalist Progress Note      Name:  Virgilio Rubinstein /Age/Sex: 1970  (44 y.o. female)   MRN & CSN:  [id number] & [id number] Admission Date/Time: 2021 10:10 AM   Location:  [address] [place] [address] PCP: Daniel Guillen MD, MD         Hospital Day: 2    Assessment and Plan:   Virgilio Rubinstein is a 44 y.o.  female chronic pancreatitis, COPD, BOY, morbid obesity, fibromyalgia, seizures, hypothyroidism who presented to the hospital on 2021 for laparoscopic Jet-en-Y by surgical team.    1.  Postop state and pain: Management per surgical team  Resume home tizanidine and Vistaril as patient's anxiety is worsening her pain. 2.  S/p Jet-en-Y bypass: Appreciate surgical team  Completed perioperative antibiotics. 3.  Hypothyroidism: Continue Synthroid  4. Fibromyalgia: Continue Neurontin. 5.  Seizures: Resume Keppra  6. Reactive depression: Continue Atarax, Paxil. Diet BARIATRIC DIET; Bariatric Full Liquid   DVT Prophylaxis [x] Lovenox, []  Heparin, [] SCDs, [] Ambulation   GI Prophylaxis [x] PPI,  [] H2 Blocker,  [] Carafate,  [] Diet/Tube Feeds   Code Status Full Code   Disposition Patient requires continued admission due to postop status. MDM [] Low, [x] Moderate,[x]  High  Patient's risk as above due to postop state and medication management     History of Present Illness:     Chief Complaint: <principal problem not specified> bariatric surgery    Virgilio Rubinstein is a 44 y.o.  female past medical history above is complaining of pain, rough nights, needing to take the rest of her medications including her anxiety medicine and her chronic fibromyalgia pain medicine. Ten point ROS reviewed negative, unless as noted above    Objective:        Intake/Output Summary (Last 24 hours) at 2021 1447  Last data filed at 2021 1706  Gross per 24 hour   Intake 800 ml   Output 60 ml   Net 740 ml      Vitals:   Vitals:    21 1300   BP: (!) 112/59   Pulse: 73   Resp: 14   Temp: 98.1 °F (36.7 °C)   SpO2: 95%     Physical Exam:   GEN: Awake female, alert and oriented x3 in no apparent distress. Appears given age. HEENT: Normal  NECK: Supple, no apparent thyromegaly or masses. No NOEMY  RESP: Clear lung fields bilaterally. Symmetric chest movement while on room air. CVS: RRR, S1, S2  GI/: Abdomen is soft, appropriately tender, no organomegaly. . Bowel sounds normal, rectal exam deferred. No CVA tenderness. MSK: No gross joint deformities. No tenderness  SKIN: Normal coloration, warm, dry. NEURO: Cranial nerves appear grossly intact, normal speech, no lateralizing weakness. PSYCH:  Affect appropriate.     Medications:   Medications:    albuterol sulfate HFA  2 puff Inhalation 4x Daily    atenolol  25 mg Oral Daily    levothyroxine  125 mcg Oral Daily    PARoxetine  30 mg Oral Nightly    sodium chloride flush  10 mL Intravenous 2 times per day    enoxaparin  40 mg Subcutaneous Daily    pantoprazole  40 mg Intravenous BID    ondansetron  4 mg Intravenous Q4H    levETIRAcetam  1,000 mg Oral BID    gabapentin  800 mg Oral TID      Infusions:    dextrose 5% and 0.45% NaCl with KCl 20 mEq 125 mL/hr at 08/17/21 1202    sodium chloride       PRN Meds: hydrOXYzine, 50 mg, Q6H PRN  tiZANidine, 4 mg, Q8H PRN  albuterol, 2.5 mg, Q6H PRN  sodium chloride flush, 10 mL, PRN  sodium chloride, 25 mL, PRN  potassium chloride, 10 mEq, PRN  magnesium sulfate, 1,000 mg, PRN  promethazine, 12.5 mg, Q6H PRN  prochlorperazine, 10 mg, Q6H PRN  LORazepam, 1 mg, Nightly PRN  HYDROmorphone, 1.5 mg, Q2H PRN          Electronically signed by Carly Mccrary MD on 8/17/2021 at 2:47 PM

## 2021-08-18 VITALS
SYSTOLIC BLOOD PRESSURE: 107 MMHG | WEIGHT: 292.33 LBS | HEART RATE: 74 BPM | BODY MASS INDEX: 49.91 KG/M2 | OXYGEN SATURATION: 97 % | TEMPERATURE: 98.4 F | DIASTOLIC BLOOD PRESSURE: 62 MMHG | HEIGHT: 64 IN | RESPIRATION RATE: 14 BRPM

## 2021-08-18 PROCEDURE — 6370000000 HC RX 637 (ALT 250 FOR IP): Performed by: INTERNAL MEDICINE

## 2021-08-18 PROCEDURE — 2580000003 HC RX 258: Performed by: SURGERY

## 2021-08-18 PROCEDURE — 6370000000 HC RX 637 (ALT 250 FOR IP): Performed by: SURGERY

## 2021-08-18 PROCEDURE — 6360000002 HC RX W HCPCS: Performed by: SURGERY

## 2021-08-18 PROCEDURE — 99024 POSTOP FOLLOW-UP VISIT: CPT | Performed by: SURGERY

## 2021-08-18 PROCEDURE — C9113 INJ PANTOPRAZOLE SODIUM, VIA: HCPCS | Performed by: SURGERY

## 2021-08-18 PROCEDURE — 6370000000 HC RX 637 (ALT 250 FOR IP): Performed by: HOSPITALIST

## 2021-08-18 PROCEDURE — 94640 AIRWAY INHALATION TREATMENT: CPT

## 2021-08-18 PROCEDURE — 94761 N-INVAS EAR/PLS OXIMETRY MLT: CPT

## 2021-08-18 PROCEDURE — 2500000003 HC RX 250 WO HCPCS: Performed by: SURGERY

## 2021-08-18 RX ORDER — SCOLOPAMINE TRANSDERMAL SYSTEM 1 MG/1
1 PATCH, EXTENDED RELEASE TRANSDERMAL
Qty: 10 PATCH | Refills: 11 | Status: ON HOLD | OUTPATIENT
Start: 2021-08-18 | End: 2022-04-15

## 2021-08-18 RX ORDER — HEPARIN SODIUM (PORCINE) LOCK FLUSH IV SOLN 100 UNIT/ML 100 UNIT/ML
100 SOLUTION INTRAVENOUS PRN
Status: DISCONTINUED | OUTPATIENT
Start: 2021-08-18 | End: 2021-08-18 | Stop reason: HOSPADM

## 2021-08-18 RX ORDER — ONDANSETRON 4 MG/1
4 TABLET, ORALLY DISINTEGRATING ORAL EVERY 4 HOURS PRN
Qty: 30 TABLET | Refills: 3 | Status: SHIPPED | OUTPATIENT
Start: 2021-08-18 | End: 2022-06-24 | Stop reason: ALTCHOICE

## 2021-08-18 RX ORDER — DIAZEPAM 2 MG/1
2 TABLET ORAL NIGHTLY PRN
Qty: 20 TABLET | Refills: 0 | Status: SHIPPED | OUTPATIENT
Start: 2021-08-18 | End: 2021-08-28

## 2021-08-18 RX ORDER — PANTOPRAZOLE SODIUM 40 MG/1
40 TABLET, DELAYED RELEASE ORAL 2 TIMES DAILY
Qty: 60 TABLET | Refills: 3 | Status: SHIPPED | OUTPATIENT
Start: 2021-08-18 | End: 2021-12-20 | Stop reason: SDUPTHER

## 2021-08-18 RX ORDER — PROMETHAZINE HYDROCHLORIDE 25 MG/1
25 TABLET ORAL EVERY 6 HOURS PRN
Qty: 30 TABLET | Refills: 3 | Status: SHIPPED | OUTPATIENT
Start: 2021-08-18 | End: 2021-08-25

## 2021-08-18 RX ORDER — OXYCODONE HYDROCHLORIDE AND ACETAMINOPHEN 5; 325 MG/1; MG/1
1-2 TABLET ORAL EVERY 6 HOURS PRN
Qty: 35 TABLET | Refills: 0 | Status: SHIPPED | OUTPATIENT
Start: 2021-08-18 | End: 2021-08-25

## 2021-08-18 RX ORDER — AMOXICILLIN 250 MG
2 CAPSULE ORAL DAILY
Qty: 60 TABLET | Refills: 3 | Status: SHIPPED | OUTPATIENT
Start: 2021-08-18 | End: 2021-08-28

## 2021-08-18 RX ADMIN — ATENOLOL 25 MG: 25 TABLET ORAL at 09:09

## 2021-08-18 RX ADMIN — HYDROMORPHONE HYDROCHLORIDE 1.5 MG: 2 INJECTION, SOLUTION INTRAMUSCULAR; INTRAVENOUS; SUBCUTANEOUS at 02:49

## 2021-08-18 RX ADMIN — ONDANSETRON 4 MG: 2 INJECTION INTRAMUSCULAR; INTRAVENOUS at 05:02

## 2021-08-18 RX ADMIN — PROMETHAZINE HYDROCHLORIDE 12.5 MG: 25 INJECTION INTRAMUSCULAR; INTRAVENOUS at 15:14

## 2021-08-18 RX ADMIN — HYDROMORPHONE HYDROCHLORIDE 1.5 MG: 2 INJECTION, SOLUTION INTRAMUSCULAR; INTRAVENOUS; SUBCUTANEOUS at 05:02

## 2021-08-18 RX ADMIN — ONDANSETRON 4 MG: 2 INJECTION INTRAMUSCULAR; INTRAVENOUS at 17:24

## 2021-08-18 RX ADMIN — HYDROMORPHONE HYDROCHLORIDE 1.5 MG: 2 INJECTION, SOLUTION INTRAMUSCULAR; INTRAVENOUS; SUBCUTANEOUS at 09:00

## 2021-08-18 RX ADMIN — TIZANIDINE 4 MG: 4 TABLET ORAL at 05:02

## 2021-08-18 RX ADMIN — Medication 100 UNITS: at 17:53

## 2021-08-18 RX ADMIN — HYDROMORPHONE HYDROCHLORIDE 1.5 MG: 2 INJECTION, SOLUTION INTRAMUSCULAR; INTRAVENOUS; SUBCUTANEOUS at 15:40

## 2021-08-18 RX ADMIN — PROMETHAZINE HYDROCHLORIDE 12.5 MG: 25 INJECTION INTRAMUSCULAR; INTRAVENOUS at 09:01

## 2021-08-18 RX ADMIN — GABAPENTIN 800 MG: 400 CAPSULE ORAL at 09:11

## 2021-08-18 RX ADMIN — SODIUM CHLORIDE, PRESERVATIVE FREE 10 ML: 5 INJECTION INTRAVENOUS at 02:49

## 2021-08-18 RX ADMIN — TIZANIDINE 4 MG: 4 TABLET ORAL at 17:23

## 2021-08-18 RX ADMIN — LEVETIRACETAM 1000 MG: 500 TABLET, FILM COATED ORAL at 09:10

## 2021-08-18 RX ADMIN — PANTOPRAZOLE SODIUM 40 MG: 40 INJECTION, POWDER, FOR SOLUTION INTRAVENOUS at 05:02

## 2021-08-18 RX ADMIN — PROMETHAZINE HYDROCHLORIDE 12.5 MG: 25 INJECTION INTRAMUSCULAR; INTRAVENOUS at 02:49

## 2021-08-18 RX ADMIN — ENOXAPARIN SODIUM 40 MG: 40 INJECTION SUBCUTANEOUS at 09:09

## 2021-08-18 RX ADMIN — HYDROMORPHONE HYDROCHLORIDE 1.5 MG: 2 INJECTION, SOLUTION INTRAMUSCULAR; INTRAVENOUS; SUBCUTANEOUS at 17:51

## 2021-08-18 RX ADMIN — HYDROXYZINE PAMOATE 50 MG: 25 CAPSULE ORAL at 02:49

## 2021-08-18 RX ADMIN — HYDROXYZINE PAMOATE 50 MG: 25 CAPSULE ORAL at 12:51

## 2021-08-18 RX ADMIN — PANTOPRAZOLE SODIUM 40 MG: 40 INJECTION, POWDER, FOR SOLUTION INTRAVENOUS at 17:24

## 2021-08-18 RX ADMIN — LEVOTHYROXINE SODIUM 125 MCG: 0.12 TABLET ORAL at 06:40

## 2021-08-18 RX ADMIN — SODIUM CHLORIDE, PRESERVATIVE FREE 10 ML: 5 INJECTION INTRAVENOUS at 05:02

## 2021-08-18 RX ADMIN — GABAPENTIN 800 MG: 400 CAPSULE ORAL at 14:23

## 2021-08-18 RX ADMIN — POTASSIUM CHLORIDE, DEXTROSE MONOHYDRATE AND SODIUM CHLORIDE: 150; 5; 450 INJECTION, SOLUTION INTRAVENOUS at 05:02

## 2021-08-18 RX ADMIN — HYDROMORPHONE HYDROCHLORIDE 1.5 MG: 2 INJECTION, SOLUTION INTRAMUSCULAR; INTRAVENOUS; SUBCUTANEOUS at 12:53

## 2021-08-18 RX ADMIN — ALBUTEROL SULFATE 2 PUFF: 90 AEROSOL, METERED RESPIRATORY (INHALATION) at 16:23

## 2021-08-18 RX ADMIN — ONDANSETRON 4 MG: 2 INJECTION INTRAMUSCULAR; INTRAVENOUS at 11:40

## 2021-08-18 ASSESSMENT — PAIN DESCRIPTION - DIRECTION: RADIATING_TOWARDS: ACROSS

## 2021-08-18 ASSESSMENT — PAIN SCALES - GENERAL
PAINLEVEL_OUTOF10: 9
PAINLEVEL_OUTOF10: 7
PAINLEVEL_OUTOF10: 8
PAINLEVEL_OUTOF10: 10
PAINLEVEL_OUTOF10: 7
PAINLEVEL_OUTOF10: 8
PAINLEVEL_OUTOF10: 10

## 2021-08-18 ASSESSMENT — PAIN DESCRIPTION - ORIENTATION
ORIENTATION: MID
ORIENTATION: LOWER

## 2021-08-18 ASSESSMENT — PAIN DESCRIPTION - PAIN TYPE: TYPE: SURGICAL PAIN

## 2021-08-18 ASSESSMENT — PAIN - FUNCTIONAL ASSESSMENT: PAIN_FUNCTIONAL_ASSESSMENT: ACTIVITIES ARE NOT PREVENTED

## 2021-08-18 ASSESSMENT — PAIN DESCRIPTION - FREQUENCY
FREQUENCY: CONTINUOUS
FREQUENCY: CONTINUOUS

## 2021-08-18 ASSESSMENT — PAIN DESCRIPTION - PROGRESSION
CLINICAL_PROGRESSION: GRADUALLY WORSENING
CLINICAL_PROGRESSION: GRADUALLY WORSENING

## 2021-08-18 ASSESSMENT — PAIN DESCRIPTION - LOCATION
LOCATION: ABDOMEN
LOCATION: ABDOMEN

## 2021-08-18 ASSESSMENT — PAIN DESCRIPTION - DESCRIPTORS
DESCRIPTORS: ACHING
DESCRIPTORS: ACHING;DULL

## 2021-08-18 ASSESSMENT — PAIN DESCRIPTION - ONSET: ONSET: ON-GOING

## 2021-08-18 NOTE — PROGRESS NOTES
BARIATRIC SURGERY PROGRESS NOTE                Vitals:    08/17/21 0900 08/17/21 1300 08/17/21 2122 08/18/21 0256   BP: 104/67 (!) 112/59 133/68 (!) 147/69   Pulse: 76 73 82 82   Resp: 16 14 16 16   Temp: 98 °F (36.7 °C) 98.1 °F (36.7 °C) 97.9 °F (36.6 °C) 97.9 °F (36.6 °C)   TempSrc: Oral Oral Oral Oral   SpO2: 100% 95% 95%    Weight:       Height:         I/O last 3 completed shifts: In: 3176 [P.O.:120; I.V.:1500]  Out: 200 [Urine:200]  No intake/output data recorded. BARIATRIC DIET;  Bariatric Full Liquid    Recent Results (from the past 48 hour(s))   Basic Metabolic Panel w/ Reflex to MG    Collection Time: 08/17/21  5:41 AM   Result Value Ref Range    Sodium 137 135 - 145 MMOL/L    Potassium 4.1 3.5 - 5.1 MMOL/L    Chloride 104 99 - 110 mMol/L    CO2 26 21 - 32 MMOL/L    Anion Gap 7 4 - 16    BUN 13 6 - 23 MG/DL    CREATININE 0.7 0.6 - 1.1 MG/DL    Glucose 112 (H) 70 - 99 MG/DL    Calcium 9.2 8.3 - 10.6 MG/DL    GFR Non-African American >60 >60 mL/min/1.73m2    GFR African American >60 >60 mL/min/1.73m2   CBC auto differential    Collection Time: 08/17/21  5:41 AM   Result Value Ref Range    WBC 4.2 4.0 - 10.5 K/CU MM    RBC 3.85 (L) 4.2 - 5.4 M/CU MM    Hemoglobin 10.3 (L) 12.5 - 16.0 GM/DL    Hematocrit 32.8 (L) 37 - 47 %    MCV 85.2 78 - 100 FL    MCH 26.8 (L) 27 - 31 PG    MCHC 31.4 (L) 32.0 - 36.0 %    RDW 14.9 11.7 - 14.9 %    Platelets 243 515 - 213 K/CU MM    MPV 9.5 7.5 - 11.1 FL    Differential Type AUTOMATED DIFFERENTIAL     Segs Relative 69.3 (H) 36 - 66 %    Lymphocytes % 18.4 (L) 24 - 44 %    Monocytes % 7.3 (H) 0 - 4 %    Eosinophils % 4.0 (H) 0 - 3 %    Basophils % 0.5 0 - 1 %    Segs Absolute 2.9 K/CU MM    Lymphocytes Absolute 0.8 K/CU MM    Monocytes Absolute 0.3 K/CU MM    Eosinophils Absolute 0.2 K/CU MM    Basophils Absolute 0.0 K/CU MM    Nucleated RBC % 0.0 %    Total Nucleated RBC 0.0 K/CU MM    Total Immature Neutrophil 0.02 K/CU MM    Immature Neutrophil % 0.5 (H) 0 - 0.43 %       Scheduled Meds:   albuterol sulfate HFA  2 puff Inhalation 4x Daily    atenolol  25 mg Oral Daily    levothyroxine  125 mcg Oral Daily    PARoxetine  30 mg Oral Nightly    sodium chloride flush  10 mL Intravenous 2 times per day    enoxaparin  40 mg Subcutaneous Daily    pantoprazole  40 mg Intravenous BID    ondansetron  4 mg Intravenous Q4H    levETIRAcetam  1,000 mg Oral BID    gabapentin  800 mg Oral TID     Continuous Infusions:   dextrose 5% and 0.45% NaCl with KCl 20 mEq 125 mL/hr at 08/18/21 0502    sodium chloride         Physical Exam:  HEENT: Anicteric sclerae, Oropharyngeal mucosae moist, pink and intact. Heart:  Normal S1 and S2, RRR  Lungs: Clear to auscultation bilaterally, No audible Wheezes or Rales. Extremities: No edema. Neuro: Alert and Oriented x 3, Non focal.  Abdomen: Soft, Benign, Non tender, Non distended, Positive bowel sounds. Incision: Nicely healing: No erythema, No discharge. Active Problems: Morbid obesity with BMI of 45.0-49.9, adult ([facility]  Resolved Problems:    * No resolved hospital problems. *      Assessment and Plan:  Mague Lowery is a 44 y.o. female who is POD # 2 status post :  PPROCEDURE DONE: Laparoscopic robotic-assisted Jet-en-Y gastric bypass,  antecolic-antegastric gastrojejunal anastomosis was performed, 50 cm biliary pancreatic limb, 120 cm Jet limb, the jejunojejunostomy was performed with a 60 mm linear stapler, 2 layers, the gastrojejunal anastomosis was performed with a 60 mm linear stapler, handsewn 2 layers+ Laparoscopic EXTENSIVE lysis of adhesions BEFORE the procedure was started.     Pain and nausea under adequate control. Yesterday gastrografin upper GI ruled out stricture, r/o leaks. STARTED:    Bariatric stage I: Clears  then advance to stage II Full liquid diet: no sugar added    UP TO 4 Oz every 15 min, and over 15 min each.     Increase Ambulation to at least 4x/day walk in the hallways with assistance. Respiratory cha-operative care: Incentive Spirometry / deep breathing and coughing 10x/hr while awake. Continue DVT prophylaxis with Teds and SCDs and SC Lovenox. Continue GI prophylaxis with Protonix IV till able to tolerate PO. Discharge WHEN tolerates 32 oz / 1000 mL of liquids. Goal 75-90 gm of protein / day gradually.     ___________________________________________    Leopold Jack, MD, FACS, FICS  8/18/2021  6:34 AM

## 2021-08-18 NOTE — DISCHARGE SUMMARY
Physician Discharge Summary     Patient ID:  Ru South  [id number]  28 y.o.  1970    Admit date: 8/16/2021    Discharge date: 8/18/2021     Admitting Physician: Alondra Raman MD     Admission Diagnoses:  Morbid obesity with BMI of 45.0-49.9, adult ([facility]) [E66.01, Z68.42]  Patient Active Problem List   Diagnosis    Fibromyalgia    Lupus [facility]ic lupus erythematosus) [facility] [address] [facility]    Chronic pancreatitis ([facility])    HTN (hypertension)    Frequent UTI    Gastroesophageal reflux disease without esophagitis    Chronic depression    Fatty liver disease, nonalcoholic    Arthritis    Bilateral low back pain with left-sided sciatica    Hiatal hernia    Status post laparoscopic sleeve gastrectomy    Pseudoseizure    Chronic pain syndrome    Drug-seeking/Aberrant behavior    Lymph node disorder    Morbid obesity with BMI of 40.0-44.9, adult ([facility])    Iron deficiency anemia    Seizure disorder ([facility])    Anxiety    RUQ pain    Status post bariatric surgery    Morbid obesity with BMI of 45.0-49.9, adult ([facility] [address]    Discoloration of skin of flank resembling ecchymosis    Morbid obesity with BMI of 50.0-59.9, adult ([facility] [address]    Chest pain of uncertain etiology    Cellulitis of left thigh    Malabsorption    COPD (chronic obstructive pulmonary disease) ([facility] [address]    Hypothyroidism due to acquired atrophy of thyroid    Osteopenia    Vitamin D deficiency    Irritable bowel syndrome    Malabsorption due to intolerance, not elsewhere classified    Port-A-Cath in place     Past Medical History:   Diagnosis Date    Acid reflux     Anemia     Anxiety     Arthritis     Hands, Back And Ankles    Bleeding ulcer 2014    \"I Had Ulcers In My Stomach And Colon\"/ per pt on 8/12/2019\"they said recently having some blood in my stomach- in July ( 2019)could not find where coming from \"    Bronchitis Last Episode 2014    Chronic back pain     [facility]     [facility]    COPD (chronic obstructive pulmonary disease) ([facility])     Sees Dr. Michal Boxer Depression     Fibromyalgia Dx 2013    GERD (gastroesophageal reflux disease)     H/O echocardiogram 08/11/2015    EF >55%. LA to be at the upper limit of normal in size. LV hypertrophy with normal LV systolic, but abnormal diastolic function. Normal valvular structures and function.  H/O echocardiogram 08/30/2018    EF 55-60%    Hiatal hernia     History of blood transfusion 09/2015 And 2018    No Reaction To Blood Transfusions Received    History of sleeve gastrectomy     [place] (hard of hearing)     Right Ear    Hx of cardiac catheterization 10/24/2010    Angiographically normal coronary arteries w/ normal LV function and wall motion.  Hx of transesophageal echocardiography (PILO) for monitoring 12/02/2010    EF 55-60%. WNL.    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 name   Sweetie Morgna cysts     \"they found that I have a kidney cyst but not sure which side\" per pt on 7/15/2020    Lupus ([address] Dx 2013    \"Rheumatoid Lupus\"    MDRO (multiple drug resistant organisms) resistance     4 months ago chest from mediport    Morbid obesity ([address]     MRSA bacteremia     Nausea     \"Most Of The Time\"    Osteomyelitis of fifth toe of left foot ([address]     Pain management     Sees Dr. Helena Frost, Once A Month    Pancreatitis chronic Dx 2001    Pneumonia Dx 11-15    Seizures ([address]     Shortness of breath on exertion     Shortness of breath on exertion     Sleep apnea     Has CPAP\"no longer use the cpap since lost weight\"    Staph infection Dx 11-15    Left Foot    Staph infection Dx 11-15    \"Left Foot\"    Teeth missing Upper And Lower    Thyroid disease     Wears glasses     To Read       Discharge Diagnoses: same    Admission Condition: [address] Discharged Condition: Good. Indication for Admission: Elective bariatric surgery. Hospital Course: Patient had an uneventful hospital course after her bariatric procedure that went uneventfully: laparoscopic robotic assisted RYGB  and was tolerating bariatric stage II diet and ambulating without difficulty at the time of discharge. Consults: Hospitalist / PCP. Treatments: IV hydration, antibiotics: Ancef and metronidazole, analgesia: acetaminophen w/ codeine and Dilaudid, anticoagulation: LMW heparin, respiratory therapy: O2 and incentive spirometry and surgery: laparoscopic robotic assisted RYGB.     Discharge Exam:  BP (!) 147/69   Pulse 82   Temp 97.9 °F (36.6 °C) (Oral)   Resp 16   Ht 5' 4\" (1.626 m)   Wt 292 lb 5.3 oz (132.6 kg)   SpO2 95%   BMI 50.18 kg/m²     General Appearance:    Alert, cooperative, no distress, appears stated age   Head:    Normocephalic, without obvious abnormality, atraumatic   Eyes:    PERRL, conjunctiva/corneas clear, EOM's intact, fundi     benign, both eyes   Ears:    Normal TM's and external ear canals, both ears   Nose:   Nares normal, septum midline, mucosa normal, no drainage    or sinus tenderness   Throat:   Lips, mucosa, and tongue normal; teeth and gums normal   Neck:   Supple, symmetrical, trachea midline, no adenopathy;     thyroid:  no enlargement/tenderness/nodules; no carotid    bruit or JVD   Back:     Symmetric, no curvature, ROM normal, no CVA tenderness   Lungs:     Clear to auscultation bilaterally, respirations unlabored   Chest Wall:    No tenderness or deformity    Heart:    Regular rate and rhythm, S1 and S2 normal, no murmur, rub   or gallop   Breast Exam:    No tenderness, masses, or nipple abnormality   Abdomen:     Soft, non-tender, bowel sounds active all four quadrants,     no masses, no organomegaly senna-docusate (SENOKOT S) 8.6-50 MG per tablet Take 2 tablets by mouth daily for 10 days  Qty: 60 tablet, Refills: 3         CONTINUE these medications which have NOT CHANGED    Details   hydrOXYzine (VISTARIL) 50 MG capsule TAKE 1 CAPSULE BY MOUTH EVERY 6 HOURS AS NEEDED FOR ANXIETY  Qty: 60 capsule, Refills: 0    Associated Diagnoses: Anxiety      scopolamine (TRANSDERM-SCOP) transdermal patch APPLY 1 PATCH TRANSDERMALLY TO THE SKIN BEHIND THE EAR ONCE EVERY 3 DAYS AS NEEDED  Qty: 10 patch, Refills: 0      !! ondansetron (ZOFRAN ODT) 4 MG disintegrating tablet Take 1 tablet by mouth every 8 hours as needed for Nausea  Qty: 15 tablet, Refills: 0      !! ondansetron (ZOFRAN ODT) 4 MG disintegrating tablet Take 1 tablet by mouth every 8 hours as needed for Nausea  Qty: 15 tablet, Refills: 0      Melatonin 10 MG TABS Take 10-20 mg by mouth nightly as needed      Cyanocobalamin (VITAMIN B 12 PO) Take 1 tablet by mouth daily      meclizine (ANTIVERT) 25 MG tablet Take 25 mg by mouth 3 times daily as needed for Dizziness      levETIRAcetam (KEPPRA) 1000 MG tablet Take 1 tablet by mouth 2 times daily  Qty: 60 tablet, Refills: 3    Associated Diagnoses: Seizure disorder (HCC)      levothyroxine (SYNTHROID) 125 MCG tablet Take 1 tablet by mouth Daily  Qty: 30 tablet, Refills: 3    Associated Diagnoses: Hypothyroidism due to acquired atrophy of thyroid      atenolol (TENORMIN) 25 MG tablet Take 1 tablet by mouth daily  Qty: 30 tablet, Refills: 3    Associated Diagnoses: Essential hypertension      PARoxetine (PAXIL) 30 MG tablet Take 1.5 tablets by mouth nightly  Qty: 30 tablet, Refills: 3    Associated Diagnoses: Chronic depression      betamethasone valerate (VALISONE) 0.1 % ointment APPLY OINTMENT TO AFFECTED AREA TWICE DAILY TO HANDS AND ELBOWS FOR 21 DAYS      potassium gluconate 550 mg tablet Take 1 tablet by mouth daily      tiZANidine (ZANAFLEX) 4 MG tablet Take 4 mg by mouth every 8 hours as needed       ferrous sulfate (IRON 325) 325 (65 Fe) MG tablet Take 1 tablet by mouth daily (with breakfast)  Qty: 90 tablet, Refills: 5      Cholecalciferol (VITAMIN D3) 5000 units TABS Take 1 tablet by mouth daily      Multiple Vitamin (MVI, BARIATRIC ADVANTAGE MULTI-FORMULA, CHEW TAB) Take 1 tablet by mouth daily  Qty: 30 tablet, Refills: 5      Calcium Citrate-Vitamin D (CALCIUM + VIT D, BARIATRIC ADVANTAGE, CHEWABLE TABLET) Take 1 tablet by mouth 2 times daily  Qty: 120 tablet, Refills: 5      gabapentin (NEURONTIN) 800 MG tablet Take 800 mg by mouth 3 times daily      ! ! promethazine (PHENERGAN) 25 MG tablet TAKE 1 TABLET BY MOUTH EVERY 8 HOURS AS NEEDED FOR NAUSEA  Qty: 30 tablet, Refills: 0      cloNIDine (CATAPRES) 0.1 MG tablet Take 1 tablet by mouth 2 times daily  Qty: 60 tablet, Refills: 3    Associated Diagnoses: Essential hypertension      albuterol (PROVENTIL) (2.5 MG/3ML) 0.083% nebulizer solution Take 3 mLs by nebulization every 6 hours as needed for Wheezing  Qty: 120 each, Refills: 0    Associated Diagnoses: Chronic obstructive pulmonary disease, unspecified COPD type ([facility])      albuterol sulfate  (90 Base) MCG/ACT inhaler Inhale 2 puffs into the lungs 4 times daily  Qty: 1 Inhaler, Refills: 5       !! - Potential duplicate medications found. Please discuss with provider. STOP taking these medications       cholestyramine light 4 g packet Comments:   Reason for Stopping:         dicyclomine (BENTYL) 10 MG capsule Comments:   Reason for Stopping:         estradiol (ESTRACE) 0.5 MG tablet Comments:   Reason for Stopping:             Activity: no lifting > 20 Lbs, or Strenuous exercise for 3 weeks. Diet: encourage fluids and bariatric stage II diet for 2 wks.   Wound Care: keep wound clean and dry    Call  [phone] to make a follow-up appointment  with Dr Nissa Ho MD, [facility] in 1 week.    ____________________________________________    Signed:      Nissa Ho MD, [facility],

## 2021-08-18 NOTE — PROGRESS NOTES
Pt had 3 formed BMs last night and has several glasses of hot tea throughout the night. Pt still complained of nausea and pain but had no episodes of vomiting.

## 2021-08-19 NOTE — ANESTHESIA POSTPROCEDURE EVALUATION
Department of Anesthesiology  Postprocedure Note    Patient: Cristy Owusu  MRN: [id number]  YOB: 1970  Date of evaluation: 8/19/2021  Time:  10:47 AM     Procedure Summary     Date: 08/16/21 Room / Location: [address] [place] [address]    Anesthesia Start: 0751 Anesthesia Stop: 1627    Procedure: GASTRIC BYPASS RUFINO-EN-Y LAPAROSCOPIC ROBOTIC, LYSIS OF ADHESIONS (N/A ) Diagnosis: (MORBID OBESITY)    Surgeons: Chava Hair MD Responsible Provider: Bernard Balbuena MD    Anesthesia Type: general ASA Status: 3          Anesthesia Type: general    Anay Phase I: Anay Score: 10    Anay Phase II:      Last vitals: Reviewed and per EMR flowsheets.        Anesthesia Post Evaluation    Patient location during evaluation: PACU  Patient participation: complete - patient participated  Level of consciousness: awake and alert  Pain score: 4  Airway patency: patent  Nausea & Vomiting: no nausea and no vomiting  Complications: no  Cardiovascular status: blood pressure returned to baseline  Respiratory status: acceptable  Hydration status: euvolemic

## 2021-08-21 ENCOUNTER — HOSPITAL ENCOUNTER (EMERGENCY)
Age: 51
Discharge: LEFT BEFORE DISCHARGE | End: 2021-08-21
Payer: COMMERCIAL

## 2021-08-21 VITALS
HEART RATE: 93 BPM | RESPIRATION RATE: 19 BRPM | OXYGEN SATURATION: 98 % | TEMPERATURE: 98.4 F | BODY MASS INDEX: 47.63 KG/M2 | WEIGHT: 279 LBS | HEIGHT: 64 IN

## 2021-08-21 PROCEDURE — 80048 BASIC METABOLIC PNL TOTAL CA: CPT

## 2021-08-21 PROCEDURE — 93005 ELECTROCARDIOGRAM TRACING: CPT | Performed by: STUDENT IN AN ORGANIZED HEALTH CARE EDUCATION/TRAINING PROGRAM

## 2021-08-21 ASSESSMENT — PAIN SCALES - GENERAL: PAINLEVEL_OUTOF10: 10

## 2021-08-21 NOTE — ED TRIAGE NOTES
Pt to ED with complaints right sided abdominal pain since yesterday. Pt reports rah-en-y done on Monday.

## 2021-08-22 LAB
EKG ATRIAL RATE: 88 BPM
EKG DIAGNOSIS: NORMAL
EKG P AXIS: 38 DEGREES
EKG P-R INTERVAL: 148 MS
EKG Q-T INTERVAL: 528 MS
EKG QRS DURATION: 78 MS
EKG QTC CALCULATION (BAZETT): 638 MS
EKG R AXIS: -37 DEGREES
EKG T AXIS: -84 DEGREES
EKG VENTRICULAR RATE: 88 BPM

## 2021-08-22 PROCEDURE — 93010 ELECTROCARDIOGRAM REPORT: CPT | Performed by: INTERNAL MEDICINE

## 2021-08-24 ENCOUNTER — APPOINTMENT (OUTPATIENT)
Dept: CT IMAGING | Age: 51
End: 2021-08-24
Payer: COMMERCIAL

## 2021-08-24 ENCOUNTER — HOSPITAL ENCOUNTER (EMERGENCY)
Age: 51
Discharge: HOME | End: 2021-08-24
Payer: COMMERCIAL

## 2021-08-24 VITALS
OXYGEN SATURATION: 97 % | TEMPERATURE: 98.6 F | SYSTOLIC BLOOD PRESSURE: 119 MMHG | HEART RATE: 70 BPM | DIASTOLIC BLOOD PRESSURE: 70 MMHG | RESPIRATION RATE: 15 BRPM

## 2021-08-24 DIAGNOSIS — R10.31 ABDOMINAL PAIN, RIGHT LOWER QUADRANT: Primary | ICD-10-CM

## 2021-08-24 LAB
ALBUMIN SERPL-MCNC: 3.9 GM/DL (ref 3.4–5)
ALP BLD-CCNC: 104 IU/L (ref 40–129)
ALT SERPL-CCNC: 10 U/L (ref 10–40)
ANION GAP SERPL CALCULATED.3IONS-SCNC: 9 MMOL/L (ref 4–16)
ANISOCYTOSIS: ABNORMAL
AST SERPL-CCNC: 14 IU/L (ref 15–37)
BACTERIA: NEGATIVE /HPF
BANDED NEUTROPHILS ABSOLUTE COUNT: 0.09 K/CU MM
BANDED NEUTROPHILS RELATIVE PERCENT: 2 % (ref 5–11)
BASOPHILS ABSOLUTE: 0 K/CU MM
BASOPHILS RELATIVE PERCENT: 1 % (ref 0–1)
BILIRUB SERPL-MCNC: 0.2 MG/DL (ref 0–1)
BILIRUBIN URINE: NEGATIVE MG/DL
BLOOD, URINE: NEGATIVE
BUN BLDV-MCNC: 13 MG/DL (ref 6–23)
CALCIUM OXALATE CRYSTALS: ABNORMAL /HPF
CALCIUM SERPL-MCNC: 10 MG/DL (ref 8.3–10.6)
CHLORIDE BLD-SCNC: 102 MMOL/L (ref 99–110)
CLARITY: CLEAR
CO2: 24 MMOL/L (ref 21–32)
COLOR: YELLOW
CREAT SERPL-MCNC: 0.7 MG/DL (ref 0.6–1.1)
DIFFERENTIAL TYPE: ABNORMAL
EOSINOPHILS ABSOLUTE: 0.5 K/CU MM
EOSINOPHILS RELATIVE PERCENT: 11 % (ref 0–3)
GFR AFRICAN AMERICAN: >60 ML/MIN/1.73M2
GFR NON-AFRICAN AMERICAN: >60 ML/MIN/1.73M2
GLUCOSE BLD-MCNC: 87 MG/DL (ref 70–99)
GLUCOSE, URINE: NEGATIVE MG/DL
HCT VFR BLD CALC: 33.2 % (ref 37–47)
HEMOGLOBIN: 10.3 GM/DL (ref 12.5–16)
KETONES, URINE: NEGATIVE MG/DL
LACTATE: 0.6 MMOL/L (ref 0.4–2)
LEUKOCYTE ESTERASE, URINE: NEGATIVE
LIPASE: 13 IU/L (ref 13–60)
LYMPHOCYTES ABSOLUTE: 1.4 K/CU MM
LYMPHOCYTES RELATIVE PERCENT: 31 % (ref 24–44)
MCH RBC QN AUTO: 26.8 PG (ref 27–31)
MCHC RBC AUTO-ENTMCNC: 31 % (ref 32–36)
MCV RBC AUTO: 86.5 FL (ref 78–100)
MONOCYTES ABSOLUTE: 0.4 K/CU MM
MONOCYTES RELATIVE PERCENT: 9 % (ref 0–4)
MUCUS: ABNORMAL HPF
NITRITE URINE, QUANTITATIVE: NEGATIVE
PDW BLD-RTO: 14.6 % (ref 11.7–14.9)
PH, URINE: 6 (ref 5–8)
PLATELET # BLD: 205 K/CU MM (ref 140–440)
PLT MORPHOLOGY: ADEQUATE
PMV BLD AUTO: 9.6 FL (ref 7.5–11.1)
POTASSIUM SERPL-SCNC: 3.9 MMOL/L (ref 3.5–5.1)
PROTEIN UA: NEGATIVE MG/DL
RBC # BLD: 3.84 M/CU MM (ref 4.2–5.4)
RBC # BLD: ABNORMAL 10*6/UL
RBC URINE: <1 /HPF (ref 0–6)
SEGMENTED NEUTROPHILS ABSOLUTE COUNT: 2.1 K/CU MM
SEGMENTED NEUTROPHILS RELATIVE PERCENT: 46 % (ref 36–66)
SODIUM BLD-SCNC: 135 MMOL/L (ref 135–145)
SPECIFIC GRAVITY UA: 1.01 (ref 1–1.03)
SQUAMOUS EPITHELIAL: 3 /HPF
TOTAL PROTEIN: 6.5 GM/DL (ref 6.4–8.2)
TRICHOMONAS: ABNORMAL /HPF
UROBILINOGEN, URINE: NEGATIVE MG/DL (ref 0.2–1)
WBC # BLD: 4.5 K/CU MM (ref 4–10.5)
WBC UA: 2 /HPF (ref 0–5)

## 2021-08-24 PROCEDURE — 80053 COMPREHEN METABOLIC PANEL: CPT

## 2021-08-24 PROCEDURE — 96361 HYDRATE IV INFUSION ADD-ON: CPT

## 2021-08-24 PROCEDURE — 85007 BL SMEAR W/DIFF WBC COUNT: CPT

## 2021-08-24 PROCEDURE — 99285 EMERGENCY DEPT VISIT HI MDM: CPT

## 2021-08-24 PROCEDURE — 83690 ASSAY OF LIPASE: CPT

## 2021-08-24 PROCEDURE — 6370000000 HC RX 637 (ALT 250 FOR IP): Performed by: PHYSICIAN ASSISTANT

## 2021-08-24 PROCEDURE — 96375 TX/PRO/DX INJ NEW DRUG ADDON: CPT

## 2021-08-24 PROCEDURE — 96372 THER/PROPH/DIAG INJ SC/IM: CPT

## 2021-08-24 PROCEDURE — 85027 COMPLETE CBC AUTOMATED: CPT

## 2021-08-24 PROCEDURE — 2580000003 HC RX 258: Performed by: PHYSICIAN ASSISTANT

## 2021-08-24 PROCEDURE — 6360000004 HC RX CONTRAST MEDICATION: Performed by: PHYSICIAN ASSISTANT

## 2021-08-24 PROCEDURE — 83605 ASSAY OF LACTIC ACID: CPT

## 2021-08-24 PROCEDURE — 81001 URINALYSIS AUTO W/SCOPE: CPT

## 2021-08-24 PROCEDURE — 74177 CT ABD & PELVIS W/CONTRAST: CPT

## 2021-08-24 PROCEDURE — 96374 THER/PROPH/DIAG INJ IV PUSH: CPT

## 2021-08-24 PROCEDURE — 6360000002 HC RX W HCPCS: Performed by: PHYSICIAN ASSISTANT

## 2021-08-24 RX ORDER — 0.9 % SODIUM CHLORIDE 0.9 %
1000 INTRAVENOUS SOLUTION INTRAVENOUS ONCE
Status: COMPLETED | OUTPATIENT
Start: 2021-08-24 | End: 2021-08-24

## 2021-08-24 RX ORDER — MORPHINE SULFATE 4 MG/ML
4 INJECTION, SOLUTION INTRAMUSCULAR; INTRAVENOUS ONCE
Status: COMPLETED | OUTPATIENT
Start: 2021-08-24 | End: 2021-08-24

## 2021-08-24 RX ORDER — OXYCODONE HYDROCHLORIDE AND ACETAMINOPHEN 5; 325 MG/1; MG/1
1 TABLET ORAL ONCE
Status: COMPLETED | OUTPATIENT
Start: 2021-08-24 | End: 2021-08-24

## 2021-08-24 RX ORDER — PROMETHAZINE HYDROCHLORIDE 25 MG/ML
25 INJECTION, SOLUTION INTRAMUSCULAR; INTRAVENOUS ONCE
Status: COMPLETED | OUTPATIENT
Start: 2021-08-24 | End: 2021-08-24

## 2021-08-24 RX ORDER — HEPARIN SODIUM (PORCINE) LOCK FLUSH IV SOLN 100 UNIT/ML 100 UNIT/ML
100 SOLUTION INTRAVENOUS PRN
Status: DISCONTINUED | OUTPATIENT
Start: 2021-08-24 | End: 2021-08-24 | Stop reason: HOSPADM

## 2021-08-24 RX ORDER — CLINDAMYCIN HYDROCHLORIDE 150 MG/1
300 CAPSULE ORAL 3 TIMES DAILY
Qty: 42 CAPSULE | Refills: 0 | Status: SHIPPED | OUTPATIENT
Start: 2021-08-24 | End: 2021-08-28

## 2021-08-24 RX ORDER — ONDANSETRON 2 MG/ML
4 INJECTION INTRAMUSCULAR; INTRAVENOUS EVERY 30 MIN PRN
Status: DISCONTINUED | OUTPATIENT
Start: 2021-08-24 | End: 2021-08-24 | Stop reason: HOSPADM

## 2021-08-24 RX ADMIN — SODIUM CHLORIDE 1000 ML: 9 INJECTION, SOLUTION INTRAVENOUS at 13:40

## 2021-08-24 RX ADMIN — ONDANSETRON 4 MG: 2 INJECTION INTRAMUSCULAR; INTRAVENOUS at 13:38

## 2021-08-24 RX ADMIN — HEPARIN 100 UNITS: 100 SYRINGE at 16:53

## 2021-08-24 RX ADMIN — PROMETHAZINE HYDROCHLORIDE 25 MG: 25 INJECTION INTRAMUSCULAR; INTRAVENOUS at 14:55

## 2021-08-24 RX ADMIN — OXYCODONE HYDROCHLORIDE AND ACETAMINOPHEN 1 TABLET: 5; 325 TABLET ORAL at 16:31

## 2021-08-24 RX ADMIN — MORPHINE SULFATE 4 MG: 4 INJECTION INTRAVENOUS at 13:38

## 2021-08-24 RX ADMIN — IOPAMIDOL 75 ML: 755 INJECTION, SOLUTION INTRAVENOUS at 15:08

## 2021-08-24 ASSESSMENT — PAIN DESCRIPTION - PAIN TYPE: TYPE: ACUTE PAIN

## 2021-08-24 ASSESSMENT — PAIN SCALES - GENERAL
PAINLEVEL_OUTOF10: 6
PAINLEVEL_OUTOF10: 7
PAINLEVEL_OUTOF10: 7
PAINLEVEL_OUTOF10: 8

## 2021-08-24 ASSESSMENT — PAIN DESCRIPTION - LOCATION: LOCATION: ABDOMEN

## 2021-08-24 ASSESSMENT — PAIN DESCRIPTION - DESCRIPTORS: DESCRIPTORS: ACHING

## 2021-08-24 NOTE — ED PROVIDER NOTES
[place]      PCP: Mir Wallace MD, MD    [facility]    Chief Complaint   Patient presents with    Abdominal Pain     This patient was not evaluated by the attending physician. I have independently evaluated this patient. HPI    Anoop Cloud is a 44 y.o. female who presents to the emergency department today with a chief complaint of a burning sensation to her right abdominal wall. Patient states that she underwent gastric sleeve surgery 8 days ago, this was performed by .  Patient did have gastric sleeve surgery as well as lysis of adhesions done, like surgery went well, was seen several days ago placed on Percocet. This that she is been taking the Percocet without relief of her symptoms, she describing a burning sensation to her right abdomen, states that she feels like her surgical incisions are \"inflamed\". He is a this feels different than her normal abdominal pain. She denies nausea vomiting diarrhea. Is having bowel movements. No fevers or chills. Denies chest pain shortness of breath. REVIEW OF SYSTEMS    Constitutional:  Denies fever, chills, weight loss or weakness   HENT:  Denies sore throat or ear pain   Cardiovascular:  Denies chest pain, palpitations or swelling   Respiratory:  Denies cough or shortness of breath   GI:  See HPI above. : No hematuria or dysuria. .  Musculoskeletal:  Denies back pain or groin pain or masses. No pain or swelling of extremities.   Skin:  See HPI  Neurologic:  Denies headache, focal weakness or sensory changes   Endocrine:  Denies polyuria or polydypsia   Lymphatic:  Denies swollen glands     All other review of systems are negative  See HPI and nursing notes for additional information     PAST MEDICAL & SURGICAL HISTORY    Past Medical History:   Diagnosis Date    Acid reflux     Anemia     Anxiety     Arthritis     Hands, Back And Ankles    Bleeding ulcer 2014    \"I Had Ulcers In My Stomach And Colon\"/ exertion     Shortness of breath on exertion     Sleep apnea     Has CPAP\"no longer use the cpap since lost weight\"    Staph infection Dx 11-15    Left Foot    Staph infection Dx 11-15    \"Left Foot\"    Teeth missing     Upper And Lower    Thyroid disease     Wears glasses     To Read     Past Surgical History:   Procedure Laterality Date    APPENDECTOMY  02/1998    Done When Tubes And Ovaries Were Removed    CARDIAC CATHETERIZATION  10/24/2010    CATHETER REMOVAL N/A 7/16/2019    PORT REMOVAL performed by Estrada Jaeger MD at [address] [facility]  08/27/1991    CHOLECYSTECTOMY, LAPAROSCOPIC  1990's    COLONOSCOPY  Last Done In 2000's    DENTAL SURGERY      Teeth Extracted In Past    ENDOSCOPY, COLON, DIAGNOSTIC  Last Done In 2018    FOOT DEBRIDEMENT Left 6/16/2019    FIRST METATARSAL DEBRIDEMENT INCISION AND DRAINAGE. EXCISION OF ULCER.  RESECTION OF BONE. 1ST METATARSAL POWER LAVAGE AND BONE CEMENT performed by Uma Avalos DPM at [address] Left Last Done In 2016     Dr. Petros Guillaume, \" About 6 Surgeries Done Because Of Staph Infection\"    HIATAL HERNIA REPAIR N/A 2/12/2019    HERNIA HIATAL LAPAROSCOPIC ROBOTIC performed by Estrada Jaeger MD at [address] [facility]  10/1997    Partial Abdominal Hysterectomy    INSERTION / REMOVAL / REPLACEMENT VENOUS ACCESS CATHETER N/A 8/15/2019    PORT INSERTION performed by Estrada Jaeger MD at [address] [facility]    removed after 6 months    LEG BIOPSY EXCISION Left 3/8/2021    LEFT THIGH LESION BIOPSY EXCISION performed by Estrada Jaeger MD at [facility] 4, PARTIAL Left 2008    Benign    OTHER SURGICAL HISTORY  02/1998    \"Tubes And Ovaries Removed, Appendectomy Also Done\"    OTHER SURGICAL HISTORY  Last Done 7-15-16    Mediport Insertion \"Total Of Six Done, Removed Last Mediport In 2014\"    PORT SURGERY N/A 1/15/2020    PORT REMOVAL performed by Estrada Jaeger MD at [address] N/A 7/6/2020    PORT INSERTION performed by Cassie Freeman MD at [address] [facility] [address] Left 8/3/2021    MEDIPORT REPLACEMENT performed by Cassie Freeman MD at [facility] N/A 8/16/2021    GASTRIC BYPASS RUFINO-EN-Y LAPAROSCOPIC ROBOTIC, LYSIS OF ADHESIONS performed by Cassie Freeman MD at [address] N/A 2/12/2019    GASTRECTOMY SLEEVE LAPAROSCOPIC ROBOTIC performed by Cassie Freeman MD at [address] [facility]  08/27/2018    UPPER GASTROINTESTINAL ENDOSCOPY N/A 4/2/2019    EGD CONTROL HEMORRHAGE with epi injection at bleeding site performed by Cassie Freeman MD at [address] [place] [address] 6/19/2019    EGD DIAGNOSTIC ONLY performed by Cassie Freeman MD at [address] [place] [address] N/A 7/22/2019    EGD DIAGNOSTIC ONLY performed by Mariela Osman MD at [address] [place] [address] N/A 10/14/2019    EGD DIAGNOSTIC ONLY performed by Cassie Freeman MD at [address] [place] [address] N/A 7/17/2020    EGJ DILATATION BALLOON WITH 18-20 MM BALLOON, DILATED TO 20 MM. performed by Cassie Freeman MD at [address] [place] [address] 5/28/2021    EGD BIOPSY performed by Cassie Freeman MD at [address] [place] [address]    Current Outpatient Rx   Medication Sig Dispense Refill    clindamycin (CLEOCIN) 150 MG capsule Take 2 capsules by mouth 3 times daily for 7 days Pharmacist: May substitute 150mg tabs and take 2 tabs per dose. 42 capsule 0    oxyCODONE-acetaminophen (PERCOCET) 5-325 MG per tablet Take 1-2 tablets by mouth every 6 hours as needed for Pain for up to 7 days.  35 tablet 0    promethazine (PHENERGAN) 25 MG tablet Take 1 tablet by mouth every 6 hours as needed for Nausea 30 tablet 3    pantoprazole (PROTONIX) 40 MG tablet Take 1 tablet by mouth 2 times daily 60 tablet 3    senna-docusate (SENOKOT S) 8.6-50 MG per tablet Take 2 tablets by mouth daily for 10 days 60 tablet 3    ondansetron (ZOFRAN ODT) 4 MG disintegrating tablet Take 1 tablet by mouth every 4 hours as needed for Nausea or Vomiting 30 tablet 3    diazePAM (VALIUM) 2 MG tablet Take 1 tablet by mouth nightly as needed for Anxiety for up to 10 days.  20 tablet 0    scopolamine (TRANSDERM-SCOP, 1.5 MG,) transdermal patch Place 1 patch onto the skin every 72 hours 10 patch 11    hydrOXYzine (VISTARIL) 50 MG capsule TAKE 1 CAPSULE BY MOUTH EVERY 6 HOURS AS NEEDED FOR ANXIETY 60 capsule 0    promethazine (PHENERGAN) 25 MG tablet TAKE 1 TABLET BY MOUTH EVERY 8 HOURS AS NEEDED FOR NAUSEA 30 tablet 0    scopolamine (TRANSDERM-SCOP) transdermal patch APPLY 1 PATCH TRANSDERMALLY TO THE SKIN BEHIND THE EAR ONCE EVERY 3 DAYS AS NEEDED 10 patch 0    ondansetron (ZOFRAN ODT) 4 MG disintegrating tablet Take 1 tablet by mouth every 8 hours as needed for Nausea 15 tablet 0    ondansetron (ZOFRAN ODT) 4 MG disintegrating tablet Take 1 tablet by mouth every 8 hours as needed for Nausea 15 tablet 0    Melatonin 10 MG TABS Take 10-20 mg by mouth nightly as needed      Cyanocobalamin (VITAMIN B 12 PO) Take 1 tablet by mouth daily      meclizine (ANTIVERT) 25 MG tablet Take 25 mg by mouth 3 times daily as needed for Dizziness      levETIRAcetam (KEPPRA) 1000 MG tablet Take 1 tablet by mouth 2 times daily 60 tablet 3    levothyroxine (SYNTHROID) 125 MCG tablet Take 1 tablet by mouth Daily 30 tablet 3    atenolol (TENORMIN) 25 MG tablet Take 1 tablet by mouth daily 30 tablet 3    cloNIDine (CATAPRES) 0.1 MG tablet Take 1 tablet by mouth 2 times daily (Patient taking differently: Take 0.1 mg by mouth 2 times daily as needed for High Blood Pressure 05/27/21 Patient states she only takes as needed usually if SBP>150) 60 tablet 3    PARoxetine (PAXIL) 30 MG tablet Take 1.5 tablets by mouth nightly (Patient taking differently: Take 30 mg by mouth nightly ) 30 tablet 3    albuterol (PROVENTIL) (2.5 MG/3ML) 0.083% nebulizer solution Take 3 mLs by nebulization every 6 hours as needed for Wheezing 120 each 0    betamethasone valerate (VALISONE) 0.1 % ointment APPLY OINTMENT TO AFFECTED AREA TWICE DAILY TO HANDS AND ELBOWS FOR 21 DAYS      potassium gluconate 550 mg tablet Take 1 tablet by mouth daily      tiZANidine (ZANAFLEX) 4 MG tablet Take 4 mg by mouth every 8 hours as needed       albuterol sulfate  (90 Base) MCG/ACT inhaler Inhale 2 puffs into the lungs 4 times daily 1 Inhaler 5    ferrous sulfate (IRON 325) 325 (65 Fe) MG tablet Take 1 tablet by mouth daily (with breakfast) 90 tablet 5    Cholecalciferol (VITAMIN D3) 5000 units TABS Take 1 tablet by mouth daily      Multiple Vitamin (MVI, BARIATRIC ADVANTAGE MULTI-FORMULA, CHEW TAB) Take 1 tablet by mouth daily 30 tablet 5    Calcium Citrate-Vitamin D (CALCIUM + VIT D, BARIATRIC ADVANTAGE, CHEWABLE TABLET) Take 1 tablet by mouth 2 times daily 120 tablet 5    gabapentin (NEURONTIN) 800 MG tablet Take 800 mg by mouth 3 times daily         ALLERGIES    Allergies   Allergen Reactions    Aspirin Palpitations     \"My Heart Rate Elevates\"    Shellfish-Derived Products Swelling    Toradol [Ketorolac Tromethamine] Rash    Haldol [Haloperidol]      Shaking      Reglan [Metoclopramide] Itching       SOCIAL AND FAMILY HISTORY    Social History     Socioeconomic History    Marital status:      Spouse name: None    Number of children: None    Years of education: None    Highest education level: None   Occupational History    None   Tobacco Use    Smoking status: Never Smoker    Smokeless tobacco: Never Used   Vaping Use    Vaping Use: Never used   Substance and Sexual Activity    Alcohol use: No     Alcohol/week: 0.0 standard drinks    Drug use: No    Sexual activity: Not Currently   Other Topics Concern    None   Social History Narrative    None     Social Determinants of Health     Financial Resource Strain:     Difficulty of Paying Living Expenses:    Food Insecurity:     Worried About Running Out of Food in the Last Year:     [address] in the Last Year:    Transportation Needs:     Lack of Transportation (Medical):  Lack of Transportation (Non-Medical):    Physical Activity:     Days of Exercise per Week:     Minutes of Exercise per Session:    Stress:     Feeling of Stress :    Social Connections:     Frequency of Communication with Friends and Family:     Frequency of Social Gatherings with Friends and Family:     Attends [employer] Services:     Active Member of Clubs or Organizations:     Attends Club or Organization Meetings:     Marital Status:    Intimate Partner Violence:     Fear of Current or Ex-Partner:     Emotionally Abused:     Physically Abused:     Sexually Abused:      Family History   Problem Relation Age of Onset    Diabetes Mother         \"Borderline Diabetes\"    High Blood Pressure Mother     Obesity Mother     Arthritis Mother     Heart Disease Mother     High Cholesterol Mother     Vision Loss Mother     Diabetes Father     High Blood Pressure Father     Asthma Father     Cancer Father         prostate cancer    High Blood Pressure Brother     Asthma Son     Vision Loss Son     Lupus Daughter     Other Daughter         \"Alot Of Female Problems\"       PHYSICAL EXAM    VITAL SIGNS: /70   Pulse 70   Temp 98.6 °F (37 °C)   Resp 15   SpO2 97%   Constitutional:  Well developed, well nourished. No distress  Eyes:  Sclera nonicteric, conjunctiva moist  HENT:  Atraumatic. PERRL. EOMI.  moist mucus membranes.   Neck/Lymphatics: supple, no JVD, no swollen nodes  Respiratory:  No retractions, no accessory muscle use, normal breath sounds   Cardiovascular:   normal rate, normal rhythm, no murmurs    GI:    Obvious body habitus, evidence of appropriately healing laparoscopic surgical scars with no sign of significant infection. Bowel sounds present, no audible bruits. Soft,  No distention, no guarding, no rigidity,   + RLQ abdominal tenderness, no rebound, no palpable pulsatile masses,  *Back:   No CVA tenderness to percussion. Musculoskeletal:  No edema, no deformity  Vascular: There is no discernible palpable discrepancy of radial pulses bilaterally or between radial pulses & femoral pulses.   Integument: No rash, dry skin  Neurologic:  Alert & oriented, normal speech  Psychiatric: Cooperative, pleasant affect       LABS:  Results for orders placed or performed during the hospital encounter of 08/24/21   CBC auto diff   Result Value Ref Range    WBC 4.5 4.0 - 10.5 K/CU MM    RBC 3.84 (L) 4.2 - 5.4 M/CU MM    Hemoglobin 10.3 (L) 12.5 - 16.0 GM/DL    Hematocrit 33.2 (L) 37 - 47 %    MCV 86.5 78 - 100 FL    MCH 26.8 (L) 27 - 31 PG    MCHC 31.0 (L) 32.0 - 36.0 %    RDW 14.6 11.7 - 14.9 %    Platelets 076 947 - 355 K/CU MM    MPV 9.6 7.5 - 11.1 FL    Bands Relative 2 (L) 5 - 11 %    Segs Relative 46.0 36 - 66 %    Eosinophils % 11.0 (H) 0 - 3 %    Basophils % 1.0 0 - 1 %    Lymphocytes % 31.0 24 - 44 %    Monocytes % 9.0 (H) 0 - 4 %    Bands Absolute 0.09 K/CU MM    Segs Absolute 2.1 K/CU MM    Eosinophils Absolute 0.5 K/CU MM    Basophils Absolute 0.0 K/CU MM    Lymphocytes Absolute 1.4 K/CU MM    Monocytes Absolute 0.4 K/CU MM    Differential Type MANUAL DIFFERENTIAL     RBC Morphology OCCASIONAL     Anisocytosis 1+     PLT Morphology ADEQUATE    CMP   Result Value Ref Range    Sodium 135 135 - 145 MMOL/L    Potassium 3.9 3.5 - 5.1 MMOL/L    Chloride 102 99 - 110 mMol/L    CO2 24 21 - 32 MMOL/L    BUN 13 6 - 23 MG/DL    CREATININE 0.7 0.6 - 1.1 MG/DL    Glucose 87 70 - 99 MG/DL    Calcium 10.0 8.3 - 10.6 MG/DL    Albumin 3.9 3.4 - 5.0 GM/DL    Total Protein 6.5 6.4 - 8.2 GM/DL    Total Bilirubin 0.2 0.0 - 1.0 MG/DL    ALT 10 10 - 40 U/L    AST 14 (L) 15 - 37 IU/L Alkaline Phosphatase 104 40 - 129 IU/L    GFR Non-African American >60 >60 mL/min/1.73m2    GFR African American >60 >60 mL/min/1.73m2    Anion Gap 9 4 - 16   Urinalysis   Result Value Ref Range    Color, UA YELLOW YELLOW    Clarity, UA CLEAR CLEAR    Glucose, Urine NEGATIVE NEGATIVE MG/DL    Bilirubin Urine NEGATIVE NEGATIVE MG/DL    Ketones, Urine NEGATIVE NEGATIVE MG/DL    Specific Gravity, UA 1.015 1.001 - 1.035    Blood, Urine NEGATIVE NEGATIVE    pH, Urine 6.0 5.0 - 8.0    Protein, UA NEGATIVE NEGATIVE MG/DL    Urobilinogen, Urine NEGATIVE 0.2 - 1.0 MG/DL    Nitrite Urine, Quantitative NEGATIVE NEGATIVE    Leukocyte Esterase, Urine NEGATIVE NEGATIVE    RBC, UA <1 0 - 6 /HPF    WBC, UA 2 0 - 5 /HPF    Bacteria, UA NEGATIVE NEGATIVE /HPF    Squam Epithel, UA 3 /HPF    Mucus, UA RARE (A) NEGATIVE HPF    Trichomonas, UA NONE SEEN NONE SEEN /HPF    Ca Oxalate Zoila, UA RARE /HPF   Lipase   Result Value Ref Range    Lipase 13 13 - 60 IU/L   Lactic Acid, Plasma   Result Value Ref Range    Lactate 0.6 0.4 - 2.0 mMOL/L     RADIOLOGY/PROCEDURES    CT ABDOMEN PELVIS W IV CONTRAST Additional Contrast? None    Result Date: 8/24/2021  EXAMINATION: CT OF THE ABDOMEN AND PELVIS WITH CONTRAST, 8/24/2021 2:07 pm TECHNIQUE: CT of the abdomen and pelvis was performed with the administration of intravenous contrast. Multiplanar reformatted images are provided for review. Dose modulation, iterative reconstruction, and/or weight based adjustment of the mA/kV was utilized to reduce the radiation dose to as low as reasonably achievable.  COMPARISON: July 2, 2021 HISTORY: ORDERING SYSTEM PROVIDED HISTORY: right lower abdominal pain , history of gastric by pass and lysis of adhesions TECHNOLOGIST PROVIDED HISTORY: Reason for exam:->right lower abdominal pain , history of gastric by pass and lysis of adhesions Additional Contrast?->None Decision Support Exception - unselect if not a suspected or confirmed emergency medical condition->Emergency Medical Condition (MA) Reason for Exam: right lower abdominal pain , history of gastric by pass and lysis of adhesions Acuity: Acute Type of Exam: Initial FINDINGS: Lower Chest: No active disease. Calcified granulomata. Noncalcified nodules in the left lower lobe remains stable. Organs: The liver demonstrates air in the biliary system also previously seen likely related to previous sphincterotomy. Status post cholecystectomy. Pancreas appears unremarkable. There is splenomegaly with the spleen measuring 16 cm. Previously the spleen measured 15 cm. Adrenals and kidneys appear unremarkable. Aorta and IVC appear unremarkable. GI/Bowel: Status post gastric bypass. No evidence of bowel obstruction or perforation. Pelvis: Status post hysterectomy. Urinary bladder and UV junctions appear unremarkable. Peritoneum/Retroperitoneum: No evidence of lymphadenopathyScattered nodes are seen. Some mesenteric inflammatory changes in the anterior mesentery but no evidence of abscess. Bones/Soft Tissues: Areas of subcutaneous soft tissue thickening which was not previously seen as well as skin thickening may indicate cellulitis. No evidence of destructive bony changes. 1. Subcutaneous soft tissue thickening as well as skin thickening may indicates cellulitis. 2. Mild peritoneal inflammation without abscess. Status post gastric bypass. 3. Splenomegaly more pronounced. 4. Redemonstration of air in the biliary system also previously seen likely related to sphincterotomy.      FL UGI W SMALL BOWEL    Result Date: 8/17/2021  EXAMINATION: SINGLE CONTRAST UPPER GI SERIES 8/17/2021 HISTORY: ORDERING SYSTEM PROVIDED HISTORY: WATER SOLUBLE CONTRAST please - r/o gastro-jejunal anastomotic leak - DO NOT OVER STRETCH the pouch please TECHNOLOGIST PROVIDED HISTORY: Reason for exam:->WATER SOLUBLE CONTRAST please - r/o gastro-jejunal anastomotic leak - DO NOT OVER STRETCH the pouch please Reason for Exam: WATER SOLUBLE CONTRAST please - r/o gastro-jejunal anastomotic leak - DO NOT OVER STRETCH the pouch please COMPARISON: None. TECHNIQUE: Multiple single contrast images of the esophagus, gastroesophageal junction and stomach were obtained following the oral administration of water soluble contrast FLUOROSCOPY DOSE AND TYPE OR TIME AND EXPOSURES: Fluoro time 1.6 minutes. D AP 1276. 11. Entrance dose 103.40 mGy. FINDINGS: On the single contrast images, no evidence of stricture or obstruction. The esophagus demonstrates normal peristalsis under fluoroscopy. Status post Jet-en-Y gastric bypass. Expected edema at the [address] and JJ anastomoses. No evidence of leak. No hiatal hernia is seen and there is no evidence of achalasia. No gastroesophageal reflux was elicited during examination. No leak or obstruction. FL LESS THAN 1 HOUR    Result Date: 8/3/2021  Radiology exam is complete. No Radiologist dictation. Please follow up with ordering provider. XR CHEST PORTABLE    Result Date: 8/3/2021  EXAMINATION: ONE XRAY VIEW OF THE CHEST 8/3/2021 2:36 pm COMPARISON: Chest 07/02/2021 HISTORY: ORDERING SYSTEM PROVIDED HISTORY: Left subclavian vein portacath placement TECHNOLOGIST PROVIDED HISTORY: Reason for exam:-> left SCV portacath placement Reason for Exam: Left SCV portacath placement FINDINGS: The cardiomediastinal and hilar silhouettes appear unremarkable. The lungs appear clear. No pleural effusion evident. No pneumothorax is seen. No acute osseous abnormality is identified. Left subclavian vein Port-A-Cath is been placed, tip overlying the mid superior vena cava level, without any kinking evident. No radiographic evidence of acute cardiopulmonary disease. Left subclavian vein Port-A-Cath is been placed, tip overlying the mid superior vena cava level, without any kinking evident and no pneumothorax.      IR CONTRAST INJECTION  EXISTING CV ACCESS DEVICE EVALUATION W FLUOROSC    Result Date: 7/30/2021  PROCEDURE: IR underlying surgical abnormality. Secondary to the CT findings and the description of burning and pain to the abdomen, will cover for empiric antibiotics. At this point she will be advised to continue her at home pain medication and follow-up with general surgery. Will advise close monitoring of symptoms and strict return precautions. Was discharged home in stable condition    Clinical  IMPRESSION    1. Abdominal pain, right lower quadrant              Comment: Please note this report has been produced using speech recognition software and may contain errors related to that system including errors in grammar, punctuation, and spelling, as well as words and phrases that may be inappropriate. If there are any questions or concerns please feel free to contact the dictating provider for clarification.         OSMANI Brewer  08/24/21 [id number]

## 2021-08-24 NOTE — ED NOTES
Jose KEEN at bedside discussing results and plan of care     [place], [address] [facility]  08/24/21 [id number]

## 2021-08-25 ENCOUNTER — CARE COORDINATION (OUTPATIENT)
Dept: CARE COORDINATION | Age: 51
End: 2021-08-25

## 2021-08-25 NOTE — CARE COORDINATION
Pt seen in ER for pain after surgery. Pt sent home with atb for cellulitis. Pt educated on atb directions and to take entire prescription. Pt has not filled script at this time but says it will be started today. Pt c/o of pain, education provided on atb may take several days to start to feel better from infections symptoms. Pt expressed understanding. Denies further questions or concerns.

## 2021-08-26 ENCOUNTER — CARE COORDINATION (OUTPATIENT)
Dept: CARE COORDINATION | Age: 51
End: 2021-08-26

## 2021-08-26 NOTE — CARE COORDINATION
Pt called concerned still having pain from cellultitis/surgery. Pt advised to call surgical group if pain is worse for surgical advice. Pt states she wants to wait another day and if it gets too bad she will return to ER. Concerned because of her lupus causing infections to be hard to treat.

## 2021-08-28 ENCOUNTER — HOSPITAL ENCOUNTER (EMERGENCY)
Age: 51
Discharge: HOME | End: 2021-08-28
Attending: EMERGENCY MEDICINE
Payer: COMMERCIAL

## 2021-08-28 ENCOUNTER — APPOINTMENT (OUTPATIENT)
Dept: CT IMAGING | Age: 51
End: 2021-08-28
Payer: COMMERCIAL

## 2021-08-28 VITALS
OXYGEN SATURATION: 99 % | RESPIRATION RATE: 17 BRPM | BODY MASS INDEX: 45.07 KG/M2 | TEMPERATURE: 97.7 F | DIASTOLIC BLOOD PRESSURE: 72 MMHG | SYSTOLIC BLOOD PRESSURE: 142 MMHG | HEIGHT: 64 IN | WEIGHT: 264 LBS | HEART RATE: 67 BPM

## 2021-08-28 DIAGNOSIS — N30.00 ACUTE CYSTITIS WITHOUT HEMATURIA: ICD-10-CM

## 2021-08-28 DIAGNOSIS — R10.31 RIGHT LOWER QUADRANT ABDOMINAL PAIN: Primary | ICD-10-CM

## 2021-08-28 LAB
ALBUMIN SERPL-MCNC: 4.1 GM/DL (ref 3.4–5)
ALP BLD-CCNC: 105 IU/L (ref 40–129)
ALT SERPL-CCNC: 12 U/L (ref 10–40)
ANION GAP SERPL CALCULATED.3IONS-SCNC: 8 MMOL/L (ref 4–16)
AST SERPL-CCNC: 20 IU/L (ref 15–37)
BACTERIA: ABNORMAL /HPF
BASOPHILS ABSOLUTE: 0.1 K/CU MM
BASOPHILS RELATIVE PERCENT: 1.3 % (ref 0–1)
BILIRUB SERPL-MCNC: 0.2 MG/DL (ref 0–1)
BILIRUBIN URINE: NEGATIVE MG/DL
BLOOD, URINE: NEGATIVE
BUN BLDV-MCNC: 13 MG/DL (ref 6–23)
CALCIUM OXALATE CRYSTALS: ABNORMAL /HPF
CALCIUM SERPL-MCNC: 9.8 MG/DL (ref 8.3–10.6)
CHLORIDE BLD-SCNC: 104 MMOL/L (ref 99–110)
CLARITY: ABNORMAL
CO2: 25 MMOL/L (ref 21–32)
COLOR: ABNORMAL
CREAT SERPL-MCNC: 0.7 MG/DL (ref 0.6–1.1)
DIFFERENTIAL TYPE: ABNORMAL
EOSINOPHILS ABSOLUTE: 0.6 K/CU MM
EOSINOPHILS RELATIVE PERCENT: 11.7 % (ref 0–3)
GFR AFRICAN AMERICAN: >60 ML/MIN/1.73M2
GFR NON-AFRICAN AMERICAN: >60 ML/MIN/1.73M2
GLUCOSE BLD-MCNC: 93 MG/DL (ref 70–99)
GLUCOSE, URINE: NEGATIVE MG/DL
HCT VFR BLD CALC: 35.4 % (ref 37–47)
HEMOGLOBIN: 10.9 GM/DL (ref 12.5–16)
IMMATURE NEUTROPHIL %: 0.2 % (ref 0–0.43)
KETONES, URINE: NEGATIVE MG/DL
LACTATE: 0.6 MMOL/L (ref 0.4–2)
LEUKOCYTE ESTERASE, URINE: NEGATIVE
LIPASE: 16 IU/L (ref 13–60)
LYMPHOCYTES ABSOLUTE: 1.7 K/CU MM
LYMPHOCYTES RELATIVE PERCENT: 32.7 % (ref 24–44)
MCH RBC QN AUTO: 27.3 PG (ref 27–31)
MCHC RBC AUTO-ENTMCNC: 30.8 % (ref 32–36)
MCV RBC AUTO: 88.5 FL (ref 78–100)
MONOCYTES ABSOLUTE: 0.4 K/CU MM
MONOCYTES RELATIVE PERCENT: 7.9 % (ref 0–4)
MUCUS: ABNORMAL HPF
NITRITE URINE, QUANTITATIVE: NEGATIVE
NUCLEATED RBC %: 0 %
PDW BLD-RTO: 14.6 % (ref 11.7–14.9)
PH, URINE: 5 (ref 5–8)
PLATELET # BLD: 247 K/CU MM (ref 140–440)
PMV BLD AUTO: 9.9 FL (ref 7.5–11.1)
POTASSIUM SERPL-SCNC: 3.9 MMOL/L (ref 3.5–5.1)
PROTEIN UA: 30 MG/DL
RBC # BLD: 4 M/CU MM (ref 4.2–5.4)
RBC URINE: 8 /HPF (ref 0–6)
SEGMENTED NEUTROPHILS ABSOLUTE COUNT: 2.4 K/CU MM
SEGMENTED NEUTROPHILS RELATIVE PERCENT: 46.2 % (ref 36–66)
SODIUM BLD-SCNC: 137 MMOL/L (ref 135–145)
SPECIFIC GRAVITY UA: 1.03 (ref 1–1.03)
SQUAMOUS EPITHELIAL: 12 /HPF
TOTAL IMMATURE NEUTOROPHIL: 0.01 K/CU MM
TOTAL NUCLEATED RBC: 0 K/CU MM
TOTAL PROTEIN: 6.6 GM/DL (ref 6.4–8.2)
TRICHOMONAS: ABNORMAL /HPF
UROBILINOGEN, URINE: NEGATIVE MG/DL (ref 0.2–1)
WBC # BLD: 5.2 K/CU MM (ref 4–10.5)
WBC UA: 9 /HPF (ref 0–5)

## 2021-08-28 PROCEDURE — 6370000000 HC RX 637 (ALT 250 FOR IP): Performed by: EMERGENCY MEDICINE

## 2021-08-28 PROCEDURE — 87040 BLOOD CULTURE FOR BACTERIA: CPT

## 2021-08-28 PROCEDURE — 96376 TX/PRO/DX INJ SAME DRUG ADON: CPT

## 2021-08-28 PROCEDURE — 6360000002 HC RX W HCPCS: Performed by: EMERGENCY MEDICINE

## 2021-08-28 PROCEDURE — 83605 ASSAY OF LACTIC ACID: CPT

## 2021-08-28 PROCEDURE — 6360000004 HC RX CONTRAST MEDICATION: Performed by: EMERGENCY MEDICINE

## 2021-08-28 PROCEDURE — 87150 DNA/RNA AMPLIFIED PROBE: CPT

## 2021-08-28 PROCEDURE — 96374 THER/PROPH/DIAG INJ IV PUSH: CPT

## 2021-08-28 PROCEDURE — 74177 CT ABD & PELVIS W/CONTRAST: CPT

## 2021-08-28 PROCEDURE — 99285 EMERGENCY DEPT VISIT HI MDM: CPT

## 2021-08-28 PROCEDURE — 2580000003 HC RX 258: Performed by: EMERGENCY MEDICINE

## 2021-08-28 PROCEDURE — 83690 ASSAY OF LIPASE: CPT

## 2021-08-28 PROCEDURE — 96375 TX/PRO/DX INJ NEW DRUG ADDON: CPT

## 2021-08-28 PROCEDURE — 81001 URINALYSIS AUTO W/SCOPE: CPT

## 2021-08-28 PROCEDURE — 85025 COMPLETE CBC W/AUTO DIFF WBC: CPT

## 2021-08-28 PROCEDURE — 80053 COMPREHEN METABOLIC PANEL: CPT

## 2021-08-28 RX ORDER — ONDANSETRON 2 MG/ML
4 INJECTION INTRAMUSCULAR; INTRAVENOUS ONCE
Status: COMPLETED | OUTPATIENT
Start: 2021-08-28 | End: 2021-08-28

## 2021-08-28 RX ORDER — 0.9 % SODIUM CHLORIDE 0.9 %
1000 INTRAVENOUS SOLUTION INTRAVENOUS ONCE
Status: COMPLETED | OUTPATIENT
Start: 2021-08-28 | End: 2021-08-28

## 2021-08-28 RX ORDER — PROMETHAZINE HYDROCHLORIDE 25 MG/1
25 TABLET ORAL ONCE
Status: COMPLETED | OUTPATIENT
Start: 2021-08-28 | End: 2021-08-28

## 2021-08-28 RX ORDER — SULFAMETHOXAZOLE AND TRIMETHOPRIM 800; 160 MG/1; MG/1
1 TABLET ORAL 2 TIMES DAILY
Qty: 14 TABLET | Refills: 0 | Status: SHIPPED | OUTPATIENT
Start: 2021-08-28 | End: 2021-09-04

## 2021-08-28 RX ORDER — ONDANSETRON 2 MG/ML
4 INJECTION INTRAMUSCULAR; INTRAVENOUS EVERY 6 HOURS PRN
Status: DISCONTINUED | OUTPATIENT
Start: 2021-08-28 | End: 2021-08-28 | Stop reason: HOSPADM

## 2021-08-28 RX ORDER — SODIUM CHLORIDE 0.9 % (FLUSH) 0.9 %
10 SYRINGE (ML) INJECTION PRN
Status: DISCONTINUED | OUTPATIENT
Start: 2021-08-28 | End: 2021-08-28 | Stop reason: HOSPADM

## 2021-08-28 RX ORDER — MORPHINE SULFATE 4 MG/ML
4 INJECTION, SOLUTION INTRAMUSCULAR; INTRAVENOUS ONCE
Status: COMPLETED | OUTPATIENT
Start: 2021-08-28 | End: 2021-08-28

## 2021-08-28 RX ORDER — HEPARIN SODIUM (PORCINE) LOCK FLUSH IV SOLN 100 UNIT/ML 100 UNIT/ML
100 SOLUTION INTRAVENOUS ONCE
Status: COMPLETED | OUTPATIENT
Start: 2021-08-28 | End: 2021-08-28

## 2021-08-28 RX ADMIN — SODIUM CHLORIDE 1000 ML: 9 INJECTION, SOLUTION INTRAVENOUS at 08:58

## 2021-08-28 RX ADMIN — IOPAMIDOL 80 ML: 755 INJECTION, SOLUTION INTRAVENOUS at 10:23

## 2021-08-28 RX ADMIN — PROMETHAZINE HYDROCHLORIDE 25 MG: 25 TABLET ORAL at 12:15

## 2021-08-28 RX ADMIN — MORPHINE SULFATE 4 MG: 4 INJECTION INTRAVENOUS at 11:18

## 2021-08-28 RX ADMIN — HEPARIN 100 UNITS: 100 SYRINGE at 12:15

## 2021-08-28 RX ADMIN — ONDANSETRON 4 MG: 2 INJECTION INTRAMUSCULAR; INTRAVENOUS at 08:59

## 2021-08-28 RX ADMIN — SODIUM CHLORIDE, PRESERVATIVE FREE 10 ML: 5 INJECTION INTRAVENOUS at 10:23

## 2021-08-28 RX ADMIN — ONDANSETRON 4 MG: 2 INJECTION INTRAMUSCULAR; INTRAVENOUS at 11:21

## 2021-08-28 RX ADMIN — MORPHINE SULFATE 4 MG: 4 INJECTION INTRAVENOUS at 09:44

## 2021-08-28 ASSESSMENT — PAIN DESCRIPTION - FREQUENCY
FREQUENCY: CONTINUOUS
FREQUENCY: CONTINUOUS

## 2021-08-28 ASSESSMENT — PAIN SCALES - GENERAL
PAINLEVEL_OUTOF10: 8
PAINLEVEL_OUTOF10: 8
PAINLEVEL_OUTOF10: 10
PAINLEVEL_OUTOF10: 7
PAINLEVEL_OUTOF10: 8

## 2021-08-28 ASSESSMENT — PAIN DESCRIPTION - PROGRESSION
CLINICAL_PROGRESSION: GRADUALLY IMPROVING
CLINICAL_PROGRESSION: NOT CHANGED

## 2021-08-28 ASSESSMENT — PAIN DESCRIPTION - LOCATION
LOCATION: ABDOMEN
LOCATION: ABDOMEN

## 2021-08-28 ASSESSMENT — PAIN DESCRIPTION - ONSET
ONSET: ON-GOING
ONSET: ON-GOING

## 2021-08-28 ASSESSMENT — PAIN DESCRIPTION - PAIN TYPE
TYPE: ACUTE PAIN
TYPE: ACUTE PAIN

## 2021-08-28 ASSESSMENT — PAIN DESCRIPTION - DESCRIPTORS
DESCRIPTORS: ACHING;CONSTANT
DESCRIPTORS: ACHING;BURNING;CONSTANT

## 2021-08-28 NOTE — ED TRIAGE NOTES
Pt presents to the ED c/o having cellulitis to the area where she was opened for heart bypass. States she started on clindamycin 4 days, states the pain is getting worse and radiating to her back. Pt is alert and oriented, rates pain 7/10.

## 2021-08-28 NOTE — ED NOTES
Patient appears to have had a seizure while this RN medicating patient. Whole body shook and patient went unresponsive and did not follow commands. Oxygen level and Heart rate stayed stable during the episode.  No post ictal period noted and patient came out of it after approximately 30-40  seconds     Natacha Moreno RN  08/28/21 [id number]

## 2021-08-28 NOTE — ED PROVIDER NOTES
Triage Chief Complaint:   Wound Check (thinks she has cellulitis and needs admitted for it) and Nausea    [place]:  Mague Lowery is a 44 y.o. female that presents with persistent abdominal pain after gastric bypass surgery. Patient reports that she had gastric bypass approximately 2 weeks ago with Dr. Missael Cole.  Patient reports that initially she felt the surgery went well and she was tolerating postoperative pain as expected. Patient reports that abdominal pain has been right lower abdomen, constant, radiating along the right side, worse with flexion and improved with holding still. Pain is currently 7 out of 10. Some associated nausea without vomiting. Patient is tolerating her liquid diet. Patient was actually evaluated 4 days ago in the emergency department for the same and diagnosed with possible \"cellulitis\" of the soft tissues in this region. Patient has been on clindamycin for this. No fevers or chills. No urinary symptoms. Patient does have baseline nausea without any significant vomiting. Normal bowel movements. Patient called her general surgery office and he is out of town and they encouraged her to come to the emergency department for further assessment.     ROS:  General:  No fevers, no chills, no weakness  Eyes:  No recent vison changes, no discharge  ENT:  No sore throat, no nasal congestion, no hearing changes  Cardiovascular:  No chest pain, no palpitations  Respiratory:  No shortness of breath, no cough, no wheezing  Gastrointestinal:  + pain, + nausea, no vomiting, no diarrhea  Musculoskeletal:  No muscle pain, no joint pain  Skin:  No rash, no pruritis, no easy bruising  Neurologic:  No speech problems, no headache, no extremity numbness, no extremity tingling, no extremity weakness  Psychiatric:  No anxiety  Genitourinary:  No dysuria, no hematuria  Endocrine:  No unexpected weight gain, no unexpected weight loss  Extremities:  no edema, no pain    Past Medical History: Diagnosis Date    Acid reflux     Anemia     Anxiety     Arthritis     Hands, Back And Ankles    Bleeding ulcer 2014    \"I Had Ulcers In My Stomach And Colon\"/ per pt on 8/12/2019\"they said recently having some blood in my stomach- in July ( 2019)could not find where coming from \"    Bronchitis Last Episode 2014    Chronic back pain     Chronic pain     [facility]    COPD (chronic obstructive pulmonary disease) [facility])     Sees Dr. Brionna Goldman    Depression     Fibromyalgia Dx 2013    GERD (gastroesophageal reflux disease)     H/O echocardiogram 08/11/2015    EF >55%. LA to be at the upper limit of normal in size. LV hypertrophy with normal LV systolic, but abnormal diastolic function. Normal valvular structures and function. H/O echocardiogram 08/30/2018    EF 55-60%    Hiatal hernia     History of blood transfusion 09/2015 And 2018    No Reaction To Blood Transfusions Received    History of sleeve gastrectomy     [place] (hard of hearing)     Right Ear    Hx of cardiac catheterization 10/24/2010    Angiographically normal coronary arteries w/ normal LV function and wall motion. Hx of transesophageal echocardiography (PILO) for monitoring 12/02/2010    EF 55-60%. WNL.     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 name    Kidney cysts     Holly Whiting found that I have a kidney cyst but not sure which side\" per pt on 7/15/2020    Lupus ([address] Dx 2013    \"Rheumatoid Lupus\"    MDRO (multiple drug resistant organisms) resistance     4 months ago chest from mediport    Morbid obesity ([address]     MRSA bacteremia     Nausea     \"Most Of The Time\"    Osteomyelitis of fifth toe of left foot ([address]     Pain management     Sees Dr. Frankie Bhatti, Once A Month    Pancreatitis chronic Dx 2001    Pneumonia Dx 11-15    Seizures (HCC)     Shortness of breath on exertion     Shortness of breath on exertion     Sleep apnea     Has CPAP\"no longer use the cpap since lost weight\"    Staph infection Dx 11-15    Left Foot    Staph infection Dx 11-15    \"Left Foot\"    Teeth missing     Upper And Lower    Thyroid disease     Wears glasses     To Read     Past Surgical History:   Procedure Laterality Date    APPENDECTOMY  02/1998    Done When Tubes And Ovaries Were Removed    CARDIAC CATHETERIZATION  10/24/2010    CATHETER REMOVAL N/A 7/16/2019    PORT REMOVAL performed by Mac Vaz MD at [facility]  08/27/1991    CHOLECYSTECTOMY, LAPAROSCOPIC  1990's    COLONOSCOPY  Last Done In 2000's    DENTAL SURGERY      Teeth Extracted In Past    ENDOSCOPY, COLON, DIAGNOSTIC  Last Done In 2018    FOOT DEBRIDEMENT Left 6/16/2019    FIRST METATARSAL DEBRIDEMENT INCISION AND DRAINAGE. EXCISION OF ULCER.  RESECTION OF BONE. 1ST METATARSAL POWER LAVAGE AND BONE CEMENT performed by Anna Amado DPM at [facility] Left Last Done In 2016     Dr. Lillie Sanchez, \" About 6 Surgeries Done Because Of Staph Infection\"    HIATAL HERNIA REPAIR N/A 2/12/2019    HERNIA HIATAL LAPAROSCOPIC ROBOTIC performed by Mac Vaz MD at [place] [address]  10/1997    Partial Abdominal Hysterectomy    INSERTION / REMOVAL / REPLACEMENT VENOUS ACCESS CATHETER N/A 8/15/2019    PORT INSERTION performed by Mac Vaz MD at [address]    removed after 6 months    LEG BIOPSY EXCISION Left 3/8/2021    LEFT THIGH LESION BIOPSY EXCISION performed by Mac Vaz MD at [address] [place] [address], PARTIAL Left 2008    Benign    OTHER SURGICAL HISTORY  02/1998    \"Tubes And Ovaries Removed, Appendectomy Also Done\"    OTHER SURGICAL HISTORY  Last Done 7-15-16    Mediport Insertion \"Total Of Six Done, Removed Last Mediport In 2014\"    PORT SURGERY N/A 1/15/2020    PORT REMOVAL performed by James Modi MD at [address] [employer] [facility] N/A 7/6/2020    [facility] 8/3/2021    MEDIPORT REPLACEMENT performed by James Modi MD at [address] [facility] N/A 8/16/2021    GASTRIC BYPASS RUFINO-EN-Y LAPAROSCOPIC ROBOTIC, LYSIS OF ADHESIONS performed by James Modi MD at [facility] N/A 2/12/2019    GASTRECTOMY SLEEVE LAPAROSCOPIC ROBOTIC performed by James Modi MD at [address] [place] [address]  08/27/2018    UPPER GASTROINTESTINAL ENDOSCOPY N/A 4/2/2019    EGD CONTROL HEMORRHAGE with epi injection at bleeding site performed by James Modi MD at [facility] [email or web address] [facility] N/A 6/19/2019    EGD DIAGNOSTIC ONLY performed by James Modi MD at [address] [place] [address] [employer] N/A 7/22/2019    EGD DIAGNOSTIC ONLY performed by Karlo Macdonald MD at [address] [place] [address] [employer] N/A 10/14/2019    EGD DIAGNOSTIC ONLY performed by James Modi MD at [address] [place] [address] [employer] N/A 7/17/2020    EGJ DILATATION BALLOON WITH 18-20 MM BALLOON, DILATED TO 20 MM. performed by James Modi MD at [employer] N/A 5/28/2021    EGD BIOPSY performed by James Modi MD at [place] [address] History   Problem Relation Age of Onset    Diabetes Mother         \"Borderline Diabetes\"    High Blood Pressure Mother     Obesity Mother     Arthritis Mother     Heart Disease Mother     High Cholesterol Mother     Vision Loss Mother     Diabetes Father     High Blood Pressure Father     Asthma Father     Cancer Father         prostate cancer    High Blood Pressure Brother     Asthma Son     Vision Loss Son     Lupus Daughter     Other Daughter         [facility] Problems\"     Social History     Socioeconomic History    Marital status:      Spouse name: Not on file    Number of children: Not on file    Years of education: Not on file    Highest education level: Not on file   Occupational History    Not on file   Tobacco Use    Smoking status: Never Smoker    Smokeless tobacco: Never Used   Vaping Use    Vaping Use: Never used   Substance and Sexual Activity    Alcohol use: No     Alcohol/week: 0.0 standard drinks    Drug use: No    Sexual activity: Not Currently   Other Topics Concern    Not on file   Social History Narrative    Not on file     Social Determinants of Health     Financial Resource Strain:     Difficulty of Paying Living Expenses:    Food Insecurity:     Worried About [address] in the Last Year:     Ran Out of Food in the Last Year:    Transportation Needs:     Lack of Transportation (Medical):     Lack of Transportation (Non-Medical):    Physical Activity:     Days of Exercise per Week:     Minutes of Exercise per Session:    Stress:     Feeling of Stress :    Social Connections:     Frequency of Communication with Friends and Family:     Frequency of Social Gatherings with Friends and Family:     Attends [employer] Services: Active Member of Clubs or Organizations:     Attends Club or Organization Meetings:     Marital Status:    Intimate Partner Violence:     Fear of Current or Ex-Partner:     Emotionally Abused:     Physically Abused:     Sexually Abused:      No current facility-administered medications for this encounter.      Current Outpatient Medications   Medication Sig Dispense Refill    sulfamethoxazole-trimethoprim (BACTRIM DS) 800-160 MG per tablet Take 1 tablet by mouth 2 times daily for 7 days 14 tablet 0    pantoprazole (PROTONIX) 40 MG tablet Take 1 tablet by mouth 2 times daily 60 tablet 3    ondansetron (ZOFRAN ODT) 4 MG disintegrating tablet Take 1 tablet by mouth every 4 hours as needed for Nausea or Vomiting 30 tablet 3    scopolamine (TRANSDERM-SCOP, 1.5 MG,) transdermal patch Place 1 patch onto the skin every 72 hours 10 patch 11    hydrOXYzine (VISTARIL) 50 MG capsule TAKE 1 CAPSULE BY MOUTH EVERY 6 HOURS AS NEEDED FOR ANXIETY 60 capsule 0    promethazine (PHENERGAN) 25 MG tablet TAKE 1 TABLET BY MOUTH EVERY 8 HOURS AS NEEDED FOR NAUSEA 30 tablet 0    scopolamine (TRANSDERM-SCOP) transdermal patch APPLY 1 PATCH TRANSDERMALLY TO THE SKIN BEHIND THE EAR ONCE EVERY 3 DAYS AS NEEDED 10 patch 0    ondansetron (ZOFRAN ODT) 4 MG disintegrating tablet Take 1 tablet by mouth every 8 hours as needed for Nausea 15 tablet 0    ondansetron (ZOFRAN ODT) 4 MG disintegrating tablet Take 1 tablet by mouth every 8 hours as needed for Nausea 15 tablet 0    Melatonin 10 MG TABS Take 10-20 mg by mouth nightly as needed      Cyanocobalamin (VITAMIN B 12 PO) Take 1 tablet by mouth daily      meclizine (ANTIVERT) 25 MG tablet Take 25 mg by mouth 3 times daily as needed for Dizziness      levETIRAcetam (KEPPRA) 1000 MG tablet Take 1 tablet by mouth 2 times daily 60 tablet 3    levothyroxine (SYNTHROID) 125 MCG tablet Take 1 tablet by mouth Daily 30 tablet 3    atenolol (TENORMIN) 25 MG tablet Take 1 tablet by mouth daily 30 tablet 3    cloNIDine (CATAPRES) 0.1 MG tablet Take 1 tablet by mouth 2 times daily (Patient taking differently: Take 0.1 mg by mouth 2 times daily as needed for High Blood Pressure 05/27/21 Patient states she only takes as needed usually if SBP>150) 60 tablet 3    PARoxetine (PAXIL) 30 MG tablet Take 1.5 tablets by mouth nightly (Patient taking differently: Take 30 mg by mouth nightly ) 30 tablet 3    albuterol (PROVENTIL) (2.5 MG/3ML) 0.083% nebulizer solution Take 3 mLs by nebulization every 6 hours as needed for Wheezing 120 each 0    betamethasone valerate (VALISONE) 0.1 % ointment APPLY OINTMENT TO AFFECTED AREA TWICE DAILY TO HANDS AND ELBOWS FOR 21 DAYS      potassium gluconate 550 mg tablet Take 1 tablet by mouth daily tiZANidine (ZANAFLEX) 4 MG tablet Take 4 mg by mouth every 8 hours as needed       albuterol sulfate  (90 Base) MCG/ACT inhaler Inhale 2 puffs into the lungs 4 times daily 1 Inhaler 5    ferrous sulfate (IRON 325) 325 (65 Fe) MG tablet Take 1 tablet by mouth daily (with breakfast) 90 tablet 5    Cholecalciferol (VITAMIN D3) 5000 units TABS Take 1 tablet by mouth daily      Multiple Vitamin (MVI, BARIATRIC ADVANTAGE MULTI-FORMULA, CHEW TAB) Take 1 tablet by mouth daily 30 tablet 5    Calcium Citrate-Vitamin D (CALCIUM + VIT D, BARIATRIC ADVANTAGE, CHEWABLE TABLET) Take 1 tablet by mouth 2 times daily 120 tablet 5    gabapentin (NEURONTIN) 800 MG tablet Take 800 mg by mouth 3 times daily       Allergies   Allergen Reactions    Aspirin Palpitations     \"My Heart Rate Elevates\"    Shellfish-Derived Products Swelling    Toradol [Ketorolac Tromethamine] Rash    Haldol [Haloperidol]      Shaking      Reglan [Metoclopramide] Itching       Nursing Notes Reviewed    Physical Exam:  ED Triage Vitals [08/28/21 0413]   Enc Vitals Group      BP (!) 108/58      Pulse 72      Resp 16      Temp 98.6 °F (37 °C)      Temp Source Oral      SpO2 98 %      Weight 264 lb (119.7 kg)      Height 5' 4\" (1.626 m)      Head Circumference       Peak Flow       Pain Score       Pain Loc       Pain Edu? Excl. in [address]? My pulse ox interpretation is - normal    General appearance:  No acute distress. Appears deconditioned but overall nontoxic. Skin:  Warm. Dry. There is no erythema or increased warmth to the abdominal wall. Incision site are well approximated without surrounding erythema. No areas of underlying fluctuance. Eye:  Extraocular movements intact. Ears, nose, mouth and throat:  Oral mucosa moist   Neck:  Trachea midline. Extremity:  No swelling. Normal ROM     Heart:  Regular rate and rhythm, normal S1 & S2, no extra heart sounds. Perfusion:  Intact.     Respiratory:  Lungs clear to auscultation bilaterally. Respirations nonlabored. Abdominal:  Normal bowel sounds. Soft. There is tenderness to palpation in the right lower and lateral abdomen on deep palpation only. No generalized peritoneal signs. Non distended. Markedly elevated BMI. Back:  No CVA tenderness to palpation     Neurological:  Alert and oriented times 3. No focal neuro deficits.              Psychiatric:  Appropriate    I have reviewed and interpreted all of the currently available lab results from this visit (if applicable):  Results for orders placed or performed during the hospital encounter of 08/28/21   CBC auto diff   Result Value Ref Range    WBC 5.2 4.0 - 10.5 K/CU MM    RBC 4.00 (L) 4.2 - 5.4 M/CU MM    Hemoglobin 10.9 (L) 12.5 - 16.0 GM/DL    Hematocrit 35.4 (L) 37 - 47 %    MCV 88.5 78 - 100 FL    MCH 27.3 27 - 31 PG    MCHC 30.8 (L) 32.0 - 36.0 %    RDW 14.6 11.7 - 14.9 %    Platelets 367 203 - 364 K/CU MM    MPV 9.9 7.5 - 11.1 FL    Differential Type AUTOMATED DIFFERENTIAL     Segs Relative 46.2 36 - 66 %    Lymphocytes % 32.7 24 - 44 %    Monocytes % 7.9 (H) 0 - 4 %    Eosinophils % 11.7 (H) 0 - 3 %    Basophils % 1.3 (H) 0 - 1 %    Segs Absolute 2.4 K/CU MM    Lymphocytes Absolute 1.7 K/CU MM    Monocytes Absolute 0.4 K/CU MM    Eosinophils Absolute 0.6 K/CU MM    Basophils Absolute 0.1 K/CU MM    Nucleated RBC % 0.0 %    Total Nucleated RBC 0.0 K/CU MM    Total Immature Neutrophil 0.01 K/CU MM    Immature Neutrophil % 0.2 0 - 0.43 %   CMP   Result Value Ref Range    Sodium 137 135 - 145 MMOL/L    Potassium 3.9 3.5 - 5.1 MMOL/L    Chloride 104 99 - 110 mMol/L    CO2 25 21 - 32 MMOL/L    BUN 13 6 - 23 MG/DL    CREATININE 0.7 0.6 - 1.1 MG/DL    Glucose 93 70 - 99 MG/DL    Calcium 9.8 8.3 - 10.6 MG/DL    Albumin 4.1 3.4 - 5.0 GM/DL    Total Protein 6.6 6.4 - 8.2 GM/DL    Total Bilirubin 0.2 0.0 - 1.0 MG/DL    ALT 12 10 - 40 U/L    AST 20 15 - 37 IU/L    Alkaline Phosphatase 105 40 - 129 IU/L    GFR Non- >60 >60 mL/min/1.73m2    GFR African American >60 >60 mL/min/1.73m2    Anion Gap 8 4 - 16   Lactic Acid, Plasma   Result Value Ref Range    Lactate 0.6 0.4 - 2.0 mMOL/L   Lipase   Result Value Ref Range    Lipase 16 13 - 60 IU/L   Urinalysis with microscopic   Result Value Ref Range    Color, UA LINK (A) YELLOW    Clarity, UA SLIGHTLY CLOUDY (A) CLEAR    Glucose, Urine NEGATIVE NEGATIVE MG/DL    Bilirubin Urine NEGATIVE NEGATIVE MG/DL    Ketones, Urine NEGATIVE NEGATIVE MG/DL    Specific Gravity, UA 1.029 1.001 - 1.035    Blood, Urine NEGATIVE NEGATIVE    pH, Urine 5.0 5.0 - 8.0    Protein, UA 30 (A) NEGATIVE MG/DL    Urobilinogen, Urine NEGATIVE 0.2 - 1.0 MG/DL    Nitrite Urine, Quantitative NEGATIVE NEGATIVE    Leukocyte Esterase, Urine NEGATIVE NEGATIVE    RBC, UA 8 (H) 0 - 6 /HPF    WBC, UA 9 (H) 0 - 5 /HPF    Bacteria, UA MANY (A) NEGATIVE /HPF    Squam Epithel, UA 12 /HPF    Mucus, UA MANY (A) NEGATIVE HPF    Trichomonas, UA NONE SEEN NONE SEEN /HPF    Ca Oxalate Zoila, UA RARE /HPF      Radiographs (if obtained):  [] The following radiograph was interpreted by myself in the absence of a radiologist:   [] Radiologist's Report Reviewed:  CT ABDOMEN PELVIS W IV CONTRAST Additional Contrast? None   Preliminary Result   Postoperative changes recent Jet-en-Y gastric bypass. Findings are similar   to the recent CT. No abscess. No acute abnormality. EKG (if obtained): (All EKG's are interpreted by myself in the absence of a cardiologist)    Chart review shows recent radiographs:  CT ABDOMEN PELVIS W IV CONTRAST Additional Contrast? None    Result Date: 8/24/2021  EXAMINATION: CT OF THE ABDOMEN AND PELVIS WITH CONTRAST, 8/24/2021 2:07 pm TECHNIQUE: CT of the abdomen and pelvis was performed with the administration of intravenous contrast. Multiplanar reformatted images are provided for review.  Dose modulation, iterative reconstruction, and/or weight based adjustment of the mA/kV was utilized to reduce the radiation dose to as low as reasonably achievable. COMPARISON: July 2, 2021 HISTORY: ORDERING SYSTEM PROVIDED HISTORY: right lower abdominal pain , history of gastric by pass and lysis of adhesions TECHNOLOGIST PROVIDED HISTORY: Reason for exam:->right lower abdominal pain , history of gastric by pass and lysis of adhesions Additional Contrast?->None Decision Support Exception - unselect if not a suspected or confirmed emergency medical condition->Emergency Medical Condition (MA) Reason for Exam: right lower abdominal pain , history of gastric by pass and lysis of adhesions Acuity: Acute Type of Exam: Initial FINDINGS: Lower Chest: No active disease. Calcified granulomata. Noncalcified nodules in the left lower lobe remains stable. Organs: The liver demonstrates air in the biliary system also previously seen likely related to previous sphincterotomy. Status post cholecystectomy. Pancreas appears unremarkable. There is splenomegaly with the spleen measuring 16 cm. Previously the spleen measured 15 cm. Adrenals and kidneys appear unremarkable. Aorta and IVC appear unremarkable. GI/Bowel: Status post gastric bypass. No evidence of bowel obstruction or perforation. Pelvis: Status post hysterectomy. Urinary bladder and UV junctions appear unremarkable. Peritoneum/Retroperitoneum: No evidence of lymphadenopathyScattered nodes are seen. Some mesenteric inflammatory changes in the anterior mesentery but no evidence of abscess. Bones/Soft Tissues: Areas of subcutaneous soft tissue thickening which was not previously seen as well as skin thickening may indicate cellulitis. No evidence of destructive bony changes. 1. Subcutaneous soft tissue thickening as well as skin thickening may indicates cellulitis. 2. Mild peritoneal inflammation without abscess. Status post gastric bypass. 3. Splenomegaly more pronounced.  4. Redemonstration of air in the biliary system also previously seen likely related to sphincterotomy. FL UGI W SMALL BOWEL    Result Date: 8/17/2021  EXAMINATION: SINGLE CONTRAST UPPER GI SERIES 8/17/2021 HISTORY: ORDERING SYSTEM PROVIDED HISTORY: WATER SOLUBLE CONTRAST please - r/o gastro-jejunal anastomotic leak - DO NOT OVER STRETCH the pouch please TECHNOLOGIST PROVIDED HISTORY: Reason for exam:->WATER SOLUBLE CONTRAST please - r/o gastro-jejunal anastomotic leak - DO NOT OVER STRETCH the pouch please Reason for Exam: WATER SOLUBLE CONTRAST please - r/o gastro-jejunal anastomotic leak - DO NOT OVER STRETCH the pouch please COMPARISON: None. TECHNIQUE: Multiple single contrast images of the esophagus, gastroesophageal junction and stomach were obtained following the oral administration of water soluble contrast FLUOROSCOPY DOSE AND TYPE OR TIME AND EXPOSURES: Fluoro time 1.6 minutes. D AP 1276. 11. Entrance dose 103.40 mGy. FINDINGS: On the single contrast images, no evidence of stricture or obstruction. The esophagus demonstrates normal peristalsis under fluoroscopy. Status post Jet-en-Y gastric bypass. Expected edema at the [address] and JJ anastomoses. No evidence of leak. No hiatal hernia is seen and there is no evidence of achalasia. No gastroesophageal reflux was elicited during examination. No leak or obstruction. FL LESS THAN 1 HOUR    Result Date: 8/3/2021  Radiology exam is complete. No Radiologist dictation. Please follow up with ordering provider. XR CHEST PORTABLE    Result Date: 8/3/2021  EXAMINATION: ONE XRAY VIEW OF THE CHEST 8/3/2021 2:36 pm COMPARISON: Chest 07/02/2021 HISTORY: ORDERING SYSTEM PROVIDED HISTORY: Left subclavian vein portacath placement TECHNOLOGIST PROVIDED HISTORY: Reason for exam:-> left SCV portacath placement Reason for Exam: Left SCV portacath placement FINDINGS: The cardiomediastinal and hilar silhouettes appear unremarkable. The lungs appear clear. No pleural effusion evident. No pneumothorax is seen.  No acute osseous abnormality is identified. Left subclavian vein Port-A-Cath is been placed, tip overlying the mid superior vena cava level, without any kinking evident. No radiographic evidence of acute cardiopulmonary disease. Left subclavian vein Port-A-Cath is been placed, tip overlying the mid superior vena cava level, without any kinking evident and no pneumothorax. IR CONTRAST INJECTION  EXISTING CV ACCESS DEVICE EVALUATION W FLUOROSC    Result Date: 7/30/2021  PROCEDURE: IR CONTRAST INJECTION  EXISTING CV ACCESS DEVICE EVALUATION W FLUOROSC 7/29/2021 HISTORY: ORDERING SYSTEM PROVIDED HISTORY: Poor intravenous access TECHNOLOGIST PROVIDED HISTORY: Reason for exam:->Malfunctioning Port, Unable to flush Is the patient pregnant?->No CONTRAST: 3 cc of Isovue 370 contrast was utilized. SEDATION: None FLUOROSCOPY DOSE AND TYPE OR TIME AND EXPOSURES: 0.2 minutes of fluoroscopic time was utilized. AK 20 mGy. DESCRIPTION OF PROCEDURE: Informed consent was obtained after a detailed explanation of the procedure including risks, benefits, and alternatives. Universal protocol was observed. The patient was placed in supine position on the angiographic table. The area overlying the port was prepped and draped in usual sterile fashion. A Bryant needle was utilized to access the port reservoir. A  film demonstrates that the catheter tip is within the superior vena cava. There is a loop within the subcutaneously tunneled portion of the catheter. There is mild pinch off of the catheter in the region of the costoclavicular ligament. There is marked obstruction to flow during the injection of contrast within the catheter. There is a small fibrin sheath noted at the catheter tip. There is no evidence of extravasation. The procedure was then terminated. Patient tolerated the entire procedure well without immediate complications. [place] FINDINGS: Small fibrin sheath at the tip of the catheter which is within the superior vena cava.   This would not explain the obstruction to injection however. Loop within the subcutaneously tunneled portion of the catheter. Mild pinch off of the catheter in the region of the costoclavicular ligament. Marked obstruction of flow during the injection of contrast within the catheter although no intrinsic obstruction within the catheter is identified. Fibrin sheath at the tip of the catheter which is within the superior vena cava. Loop within the subcutaneously tunneled portion of the catheter. Mild pinch off of the catheter in the region of the costoclavicular ligament. Marked obstruction of flow during the injection of contrast within the catheter although no intrinsic obstruction within the catheter is identified. The obstruction to injection of the port would not be caused by the fibrin sheath. Findings were discussed with Dr. Luis Strauss.  It is noted that the patient has a history of numerous prior chest port insertions. She may benefit from a right internal jugular vein access for a future port insertion. MDM:  Pt presents as above. Emergent conditions considered. Presentation prompted initial labs and imaging. Port is accessed and IV fluid bolus, IV morphine IV Zofran given. Patient kept n.p.o. Labs are overall nonemergent. Given patient's history of gastric bypass in postoperative setting I did rescan her belly. CT imaging is overall very reassuring and I did myself evaluate the CT and I do not find any's findings of cellulitis. Urinalysis suspicious for possible urinary tract infection patient will be switched from her clindamycin to Bactrim. I did discuss the case the patient's general surgery team.  I spoke with Dr. Tamara English and he reports that he knows the patient very well. Given review of CAT scan and labs he is comfortable with patient going home and I agree. Patient remains hemodynamically stable and nontoxic throughout prolonged ED observation.   Patient did have a psychogenic nonepileptic

## 2021-08-31 ENCOUNTER — TELEPHONE (OUTPATIENT)
Dept: BARIATRICS/WEIGHT MGMT | Age: 51
End: 2021-08-31

## 2021-08-31 LAB
CULTURE: ABNORMAL
CULTURE: ABNORMAL
Lab: ABNORMAL
SPECIMEN: ABNORMAL

## 2021-08-31 NOTE — TELEPHONE ENCOUNTER
Pt called into the office today stating she read her labs from [employer] and wants antibiotics called in today - she does not want to want till her appt this Friday. \" she stated she almost  last time\" . . please advise

## 2021-08-31 NOTE — PROGRESS NOTES
Physician Progress Note      PATIENT:               Margarita Hernandez  CSN #:                  [id number]  :                       1970  ADMIT DATE:       2021 10:10 AM  DISCH DATE:        2021 6:25 PM  RESPONDING  PROVIDER #:        Steven Khan MD          QUERY TEXT:    Patient admitted for a bariatric procedure and chronic pancreatitis was noted   in H&P. Please document in progress notes and discharge summary further   specificity regarding the acuity and etiology of pancreatitis:    The medical record reflects the following:  Risk Factors: Chronic pancreatitis,  Clinical Indicators: Dx of Chronic pancreatitis  found in [address] list in H&P,    Dr. Jessica Rizvi  consult note shows \"chronic pancreatitis\" found in   assessment/plan and Dr. Jessica Rizvi also notes \"She states she has chronic   pain. She also has seizures which she states were caused by uncontrolled   pain. .. [place] Following surgery she is feeling nauseous and having pain. \" and   \"Abdomen is flat. Tenderness: There is abdominal tenderness. \"  Treatment: Upper GI, CMP, Home medication include PO Protonix 40 mg, Phenergan   PRN, Odansetron PRN, cholestyramine powder, and Bentyl PRN. Thank you,  AMIE Holt, RN, [facility]  [phone]  Options provided:  -- Chronic pancreatitis has been ruled out  -- Chronic pancreatitis has been ruled in  -- Other - I will add my own diagnosis  -- Disagree - Not applicable / Not valid  -- Disagree - Clinically unable to determine / Unknown  -- Refer to Clinical Documentation Reviewer    PROVIDER RESPONSE TEXT:    This patient does have chronic pancreatitis.     Query created by: David Mas on 2021 2:07 PM      Electronically signed by:  Steven Khan MD 2021 7:31 AM

## 2021-09-01 ENCOUNTER — TELEPHONE (OUTPATIENT)
Dept: BARIATRICS/WEIGHT MGMT | Age: 51
End: 2021-09-01

## 2021-09-01 ENCOUNTER — OFFICE VISIT (OUTPATIENT)
Dept: BARIATRICS/WEIGHT MGMT | Age: 51
End: 2021-09-01

## 2021-09-01 VITALS
TEMPERATURE: 97.8 F | OXYGEN SATURATION: 98 % | SYSTOLIC BLOOD PRESSURE: 118 MMHG | HEART RATE: 57 BPM | BODY MASS INDEX: 45.34 KG/M2 | HEIGHT: 64 IN | DIASTOLIC BLOOD PRESSURE: 80 MMHG | WEIGHT: 265.6 LBS

## 2021-09-01 DIAGNOSIS — Z98.84 HISTORY OF ROUX-EN-Y GASTRIC BYPASS: Primary | ICD-10-CM

## 2021-09-01 DIAGNOSIS — F41.9 ANXIETY: ICD-10-CM

## 2021-09-01 PROCEDURE — 99024 POSTOP FOLLOW-UP VISIT: CPT | Performed by: SURGERY

## 2021-09-01 RX ORDER — OXYCODONE HCL 10 MG/1
10 TABLET, FILM COATED, EXTENDED RELEASE ORAL 2 TIMES DAILY
Qty: 20 TABLET | Refills: 0 | Status: SHIPPED | OUTPATIENT
Start: 2021-09-01 | End: 2021-09-11

## 2021-09-01 RX ORDER — NALOXONE HYDROCHLORIDE 4 MG/.1ML
SPRAY NASAL
COMMUNITY
Start: 2021-08-20

## 2021-09-01 RX ORDER — HYDROXYZINE PAMOATE 50 MG/1
50 CAPSULE ORAL EVERY 6 HOURS PRN
Qty: 60 CAPSULE | Refills: 0 | Status: SHIPPED | OUTPATIENT
Start: 2021-09-01 | End: 2021-09-23

## 2021-09-01 RX ORDER — OXYCODONE HYDROCHLORIDE AND ACETAMINOPHEN 5; 325 MG/1; MG/1
TABLET ORAL
COMMUNITY
Start: 2021-08-28

## 2021-09-01 NOTE — PROGRESS NOTES
BARIATRIC SURGERY POST OPERATIVE NOTE    SUBJECTIVE:    Patient presenting today referred from Kalie Rangel MD, MD, for   Chief Complaint   Patient presents with    Post-Op Check     PO IOANA RYGB @ [facility] 8/16/21     /80   Pulse 57   Temp 97.8 °F (36.6 °C)   Ht 5' 4.25\" (1.632 m)   Wt 265 lb 9.6 oz (120.5 kg)   SpO2 98%   BMI 45.24 kg/m²      HPI: Noris Bagley is a 44 y.o. female s/p sleeve gastrectomy 2/12/19, and RYGB;  Was feeling fine till a wk ago, when she started hurting in her RLQ, and vomiting? HURTS REAL BAD in her RLQ, I could NOT appreciate ANY hernias, but will check a stat CT to r/o any surgical abnormalities. Current Outpatient Medications:     oxyCODONE (OXYCONTIN) 10 MG extended release tablet, Take 1 tablet by mouth 2 times daily for 10 days.  Intended supply: 30 days, Disp: 20 tablet, Rfl: 0    sulfamethoxazole-trimethoprim (BACTRIM DS) 800-160 MG per tablet, Take 1 tablet by mouth 2 times daily for 7 days, Disp: 14 tablet, Rfl: 0    pantoprazole (PROTONIX) 40 MG tablet, Take 1 tablet by mouth 2 times daily, Disp: 60 tablet, Rfl: 3    ondansetron (ZOFRAN ODT) 4 MG disintegrating tablet, Take 1 tablet by mouth every 4 hours as needed for Nausea or Vomiting, Disp: 30 tablet, Rfl: 3    scopolamine (TRANSDERM-SCOP, 1.5 MG,) transdermal patch, Place 1 patch onto the skin every 72 hours, Disp: 10 patch, Rfl: 11    hydrOXYzine (VISTARIL) 50 MG capsule, TAKE 1 CAPSULE BY MOUTH EVERY 6 HOURS AS NEEDED FOR ANXIETY, Disp: 60 capsule, Rfl: 0    promethazine (PHENERGAN) 25 MG tablet, TAKE 1 TABLET BY MOUTH EVERY 8 HOURS AS NEEDED FOR NAUSEA, Disp: 30 tablet, Rfl: 0    scopolamine (TRANSDERM-SCOP) transdermal patch, APPLY 1 PATCH TRANSDERMALLY TO THE SKIN BEHIND THE EAR ONCE EVERY 3 DAYS AS NEEDED, Disp: 10 patch, Rfl: 0    ondansetron (ZOFRAN ODT) 4 MG disintegrating tablet, Take 1 tablet by mouth every 8 hours as needed for Nausea, Disp: 15 tablet, Rfl: 0    ondansetron (ZOFRAN ODT) 4 MG disintegrating tablet, Take 1 tablet by mouth every 8 hours as needed for Nausea, Disp: 15 tablet, Rfl: 0    Melatonin 10 MG TABS, Take 10-20 mg by mouth nightly as needed, Disp: , Rfl:     Cyanocobalamin (VITAMIN B 12 PO), Take 1 tablet by mouth daily, Disp: , Rfl:     meclizine (ANTIVERT) 25 MG tablet, Take 25 mg by mouth 3 times daily as needed for Dizziness, Disp: , Rfl:     levETIRAcetam (KEPPRA) 1000 MG tablet, Take 1 tablet by mouth 2 times daily, Disp: 60 tablet, Rfl: 3    levothyroxine (SYNTHROID) 125 MCG tablet, Take 1 tablet by mouth Daily, Disp: 30 tablet, Rfl: 3    atenolol (TENORMIN) 25 MG tablet, Take 1 tablet by mouth daily, Disp: 30 tablet, Rfl: 3    cloNIDine (CATAPRES) 0.1 MG tablet, Take 1 tablet by mouth 2 times daily (Patient taking differently: Take 0.1 mg by mouth 2 times daily as needed for High Blood Pressure 05/27/21 Patient states she only takes as needed usually if SBP>150), Disp: 60 tablet, Rfl: 3    PARoxetine (PAXIL) 30 MG tablet, Take 1.5 tablets by mouth nightly (Patient taking differently: Take 30 mg by mouth nightly ), Disp: 30 tablet, Rfl: 3    albuterol (PROVENTIL) (2.5 MG/3ML) 0.083% nebulizer solution, Take 3 mLs by nebulization every 6 hours as needed for Wheezing, Disp: 120 each, Rfl: 0    betamethasone valerate (VALISONE) 0.1 % ointment, APPLY OINTMENT TO AFFECTED AREA TWICE DAILY TO HANDS AND ELBOWS FOR 21 DAYS, Disp: , Rfl:     potassium gluconate 550 mg tablet, Take 1 tablet by mouth daily, Disp: , Rfl:     tiZANidine (ZANAFLEX) 4 MG tablet, Take 4 mg by mouth every 8 hours as needed , Disp: , Rfl:     albuterol sulfate  (90 Base) MCG/ACT inhaler, Inhale 2 puffs into the lungs 4 times daily, Disp: 1 Inhaler, Rfl: 5    ferrous sulfate (IRON 325) 325 (65 Fe) MG tablet, Take 1 tablet by mouth daily (with breakfast), Disp: 90 tablet, Rfl: 5    Cholecalciferol (VITAMIN D3) 5000 units TABS, Take 1 tablet by mouth daily, Disp: , Rfl:    Multiple Vitamin (MVI, BARIATRIC ADVANTAGE MULTI-FORMULA, CHEW TAB), Take 1 tablet by mouth daily, Disp: 30 tablet, Rfl: 5    Calcium Citrate-Vitamin D (CALCIUM + VIT D, BARIATRIC ADVANTAGE, CHEWABLE TABLET), Take 1 tablet by mouth 2 times daily, Disp: 120 tablet, Rfl: 5    gabapentin (NEURONTIN) 800 MG tablet, Take 800 mg by mouth 3 times daily, Disp: , Rfl:     oxyCODONE-acetaminophen (PERCOCET) 5-325 MG per tablet, TAKE 1 TABLET BY MOUTH EVERY 6 HOURS AS NEEDED FOR 28 DAYS, Disp: , Rfl:     NARCAN 4 MG/0.1ML LIQD nasal spray, USE AS DIRECTED AS NEEDED FOR SUSPECTED OPIOID OVERDOSE, Disp: , Rfl:   Past Medical History:   Diagnosis Date    Acid reflux     Anemia     Anxiety     Arthritis     Hands, Back And Ankles    Bleeding ulcer 2014    \"I Had Ulcers In My Stomach And Colon\"/ per pt on 8/12/2019\"they said recently having some blood in my stomach- in July ( 2019)could not find where coming from \"    Bronchitis Last Episode 2014    Chronic back pain     [facility]     [facility]    COPD (chronic obstructive pulmonary disease) [facility] [address]     Sees Dr. Denise Mcduffie Depression     Fibromyalgia Dx 2013    GERD (gastroesophageal reflux disease)     H/O echocardiogram 08/11/2015    EF >55%. LA to be at the upper limit of normal in size. LV hypertrophy with normal LV systolic, but abnormal diastolic function. Normal valvular structures and function.  H/O echocardiogram 08/30/2018    EF 55-60%    Hiatal hernia     History of blood transfusion 09/2015 And 2018    No Reaction To Blood Transfusions Received    History of sleeve gastrectomy     [place] (hard of hearing)     Right Ear    Hx of cardiac catheterization 10/24/2010    Angiographically normal coronary arteries w/ normal LV function and wall motion.  Hx of transesophageal echocardiography (PILO) for monitoring 12/02/2010    EF 55-60%. WNL.    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 name   Ely Manley cysts     \"they found that I have a kidney cyst but not sure which side\" per pt on 7/15/2020    Lupus ([address] Dx 2013    \"Rheumatoid Lupus\"    MDRO (multiple drug resistant organisms) resistance     4 months ago chest from [place]    Morbid obesity ([address]     MRSA bacteremia     Nausea     \"Most Of The Time\"    Osteomyelitis of fifth toe of left foot ([address]     Pain management     Sees Dr. Ceci Klein, Once A Month    Pancreatitis chronic Dx 2001    Pneumonia Dx 11-15    Seizures ([address]     Shortness of breath on exertion     Shortness of breath on exertion     Sleep apnea     Has CPAP\"no longer use the cpap since lost weight\"    Staph infection Dx 11-15    Left Foot    Staph infection Dx 11-15    \"Left Foot\"    Teeth missing     Upper And Lower    Thyroid disease     Wears glasses     To Read      Past Surgical History:   Procedure Laterality Date    APPENDECTOMY  02/1998    Done When Tubes And Ovaries Were Removed    CARDIAC CATHETERIZATION  10/24/2010    CATHETER REMOVAL N/A 7/16/2019    PORT REMOVAL performed by Chava Hair MD at [address] [place] [address]  08/27/1991    CHOLECYSTECTOMY, LAPAROSCOPIC  1990's    COLONOSCOPY  Last Done In 2000's    DENTAL SURGERY      Teeth Extracted In Past    ENDOSCOPY, COLON, DIAGNOSTIC  Last Done In 2018    FOOT DEBRIDEMENT Left 6/16/2019    FIRST METATARSAL DEBRIDEMENT INCISION AND DRAINAGE. EXCISION OF ULCER.  RESECTION OF BONE. 1ST METATARSAL POWER LAVAGE AND BONE CEMENT performed by Macho Snell DPM at [facility] Last Done In 2016     Dr. Pepe Keenan, \" About 6 Surgeries Done Because Of Staph Infection\"    HIATAL HERNIA REPAIR N/A 2/12/2019    HERNIA HIATAL LAPAROSCOPIC ROBOTIC performed by Renae Hill MD at [address]  10/1997    Partial Abdominal Hysterectomy    INSERTION / REMOVAL / REPLACEMENT VENOUS ACCESS CATHETER N/A 8/15/2019    PORT INSERTION performed by Renae Hill MD at [address] [facility]    removed after 6 months    LEG BIOPSY EXCISION Left 3/8/2021    LEFT THIGH LESION BIOPSY EXCISION performed by [facility]ae Hill MD at [facility], PARTIAL Left 2008    Benign    OTHER SURGICAL HISTORY  02/1998    \"Tubes And Ovaries Removed, Appendectomy Also Done\"    OTHER SURGICAL HISTORY  Last Done 7-15-16    Mediport Insertion \"Total Of Six Done, Removed Last Mediport In 2014\"    PORT SURGERY N/A 1/15/2020    PORT REMOVAL performed by Renae Hill MD at [facility] N/A 7/6/2020    PORT INSERTION performed by Renae Hill MD at [facility] Left 8/3/2021    MEDIPORT REPLACEMENT performed by Renae Hill MD at [facility] N/A 8/16/2021    GASTRIC BYPASS RUFINO-EN-Y LAPAROSCOPIC ROBOTIC, LYSIS OF ADHESIONS performed by Renae Hill MD at [facility] N/A 2/12/2019    GASTRECTOMY SLEEVE LAPAROSCOPIC ROBOTIC performed by Renae Hill MD at [address] [place] [address]  08/27/2018    UPPER GASTROINTESTINAL ENDOSCOPY N/A 4/2/2019    EGD CONTROL HEMORRHAGE with epi injection at bleeding site [facility] 6/19[facility]    EGD DIAGNOSTIC ONLY [facility] N/A 7/22/2019    EGD DIAGNOSTIC ONLY performed [facility] 10/14/2019    EGD DIAGNOSTIC ONLY [facility] N/ 7/17/2020    EGJ DILATATION BALLOON WITH 18-20 MM BALLOON, DILATED TO 20 MM. performed by Monica Pina MD at [address] [place] [address] N/A 5/28/2021    EGD BIOPSY performed by Monica Pina MD at [address] [facility] [address] History     Socioeconomic History    Marital status:      Spouse name: None    Number of children: None    Years of education: None    Highest education level: None   Occupational History    None   Tobacco Use    Smoking status: Never Smoker    Smokeless tobacco: Never Used   Vaping Use    Vaping Use: Never used   Substance and Sexual Activity    Alcohol use: No     Alcohol/week: 0.0 standard drinks    Drug use: No    Sexual activity: Not Currently   Other Topics Concern    None   Social History Narrative    None     Social Determinants of Health     Financial Resource Strain:     Difficulty of Paying Living Expenses:    Food Insecurity:     Worried About Running Out of Food in the Last Year:     Ran Out of Food in the Last Year:    Transportation Needs:     Lack of Transportation (Medical):      Lack of Transportation (Non-Medical):    Physical Activity:     Days of Exercise per Week:     Minutes of Exercise per Session:    Stress:     Feeling of Stress :    Social Connections:     Frequency of Communication with Friends and Family:     Frequency of Social Gatherings with Friends and Family:     Attends Rastafarian Services:     Active Member of Clubs or Organizations:     Attends Club or Organization Meetings:     Marital Status:    Intimate Partner Violence:     Fear of Current or Ex-Partner:     Emotionally Abused:     Physically Abused:     Sexually Abused:      Family History   Problem Relation Age of Onset    Diabetes Mother         \"Borderline Diabetes\"    High Blood Pressure Mother     Obesity Mother     Arthritis Mother     Heart Disease Mother     High Cholesterol Mother     Vision Loss Mother     Diabetes Father     High Blood Pressure Father     Asthma Father     Cancer Father         prostate cancer    High Blood Pressure Brother     Asthma Son     Vision Loss Son     Lupus Daughter     Other Daughter         \"Alot Of Female Problems\"     Review of Systems      OBJECTIVE:    Physical Exam      Assessment / Plan:    1. History of Jet-en-Y gastric bypass        Needs to prtn / exercise, and loose weight. Needs to take her antiepileptics. HURTS REAL BAD in her RLQ, I could NOT appreciate ANY hernias, but checked CT AND ruled out any surgical abnormalities. Will control her pain, and follow. Follow Up:  Return in about 2 weeks (around 9/15/2021) for Bariatric follow up: diet, exercise & weight loss.     Elias Proctor MD, FACS, FICS  Member of the [phone] of Met[employer] and [employer]    [phone]    9/1/21

## 2021-09-02 ENCOUNTER — TELEPHONE (OUTPATIENT)
Dept: BARIATRICS/WEIGHT MGMT | Age: 51
End: 2021-09-02

## 2021-09-02 LAB
CULTURE: NORMAL
Lab: NORMAL
SPECIMEN: NORMAL

## 2021-09-02 NOTE — TELEPHONE ENCOUNTER
Pt called into the office stating she hurts so bad and can not take the pain - she refused to go to the e.d. for further eval - pt stated she did not want anymore pain meds - however  If the dr. Augustus Kahn to give her stronger meds she said that would be fine with that please advise

## 2021-09-12 ENCOUNTER — HOSPITAL ENCOUNTER (EMERGENCY)
Age: 51
Discharge: HOME | End: 2021-09-12
Payer: COMMERCIAL

## 2021-09-12 ENCOUNTER — APPOINTMENT (OUTPATIENT)
Dept: GENERAL RADIOLOGY | Age: 51
End: 2021-09-12
Payer: COMMERCIAL

## 2021-09-12 ENCOUNTER — APPOINTMENT (OUTPATIENT)
Dept: CT IMAGING | Age: 51
End: 2021-09-12
Payer: COMMERCIAL

## 2021-09-12 VITALS
HEIGHT: 64 IN | DIASTOLIC BLOOD PRESSURE: 61 MMHG | TEMPERATURE: 98.4 F | SYSTOLIC BLOOD PRESSURE: 116 MMHG | OXYGEN SATURATION: 98 % | BODY MASS INDEX: 45.75 KG/M2 | HEART RATE: 61 BPM | RESPIRATION RATE: 19 BRPM | WEIGHT: 268 LBS

## 2021-09-12 DIAGNOSIS — R05.9 COUGH: ICD-10-CM

## 2021-09-12 DIAGNOSIS — R40.20 LOSS OF CONSCIOUSNESS (HCC): ICD-10-CM

## 2021-09-12 DIAGNOSIS — R11.2 NON-INTRACTABLE VOMITING WITH NAUSEA, UNSPECIFIED VOMITING TYPE: ICD-10-CM

## 2021-09-12 DIAGNOSIS — R10.13 ABDOMINAL PAIN, EPIGASTRIC: Primary | ICD-10-CM

## 2021-09-12 LAB
ALBUMIN SERPL-MCNC: 4 GM/DL (ref 3.4–5)
ALP BLD-CCNC: 98 IU/L (ref 40–128)
ALT SERPL-CCNC: 16 U/L (ref 10–40)
ANION GAP SERPL CALCULATED.3IONS-SCNC: 9 MMOL/L (ref 4–16)
AST SERPL-CCNC: 20 IU/L (ref 15–37)
BACTERIA: ABNORMAL /HPF
BASOPHILS ABSOLUTE: 0 K/CU MM
BASOPHILS RELATIVE PERCENT: 0.9 % (ref 0–1)
BILIRUB SERPL-MCNC: 0.3 MG/DL (ref 0–1)
BILIRUBIN URINE: NEGATIVE MG/DL
BLOOD, URINE: NEGATIVE
BUN BLDV-MCNC: 15 MG/DL (ref 6–23)
CALCIUM SERPL-MCNC: 10.3 MG/DL (ref 8.3–10.6)
CHLORIDE BLD-SCNC: 105 MMOL/L (ref 99–110)
CLARITY: ABNORMAL
CO2: 25 MMOL/L (ref 21–32)
COLOR: YELLOW
CREAT SERPL-MCNC: 0.8 MG/DL (ref 0.6–1.1)
DIFFERENTIAL TYPE: ABNORMAL
EOSINOPHILS ABSOLUTE: 0.7 K/CU MM
EOSINOPHILS RELATIVE PERCENT: 16.1 % (ref 0–3)
GFR AFRICAN AMERICAN: >60 ML/MIN/1.73M2
GFR NON-AFRICAN AMERICAN: >60 ML/MIN/1.73M2
GLUCOSE BLD-MCNC: 107 MG/DL (ref 70–99)
GLUCOSE, URINE: NEGATIVE MG/DL
HCT VFR BLD CALC: 35.8 % (ref 37–47)
HEMOGLOBIN: 11.4 GM/DL (ref 12.5–16)
HYALINE CASTS: 5 /LPF
IMMATURE NEUTROPHIL %: 0 % (ref 0–0.43)
KETONES, URINE: NEGATIVE MG/DL
LACTATE: 0.9 MMOL/L (ref 0.4–2)
LEUKOCYTE ESTERASE, URINE: NEGATIVE
LIPASE: 15 IU/L (ref 13–60)
LYMPHOCYTES ABSOLUTE: 1.6 K/CU MM
LYMPHOCYTES RELATIVE PERCENT: 34.4 % (ref 24–44)
MAGNESIUM: 2.1 MG/DL (ref 1.8–2.4)
MCH RBC QN AUTO: 27.4 PG (ref 27–31)
MCHC RBC AUTO-ENTMCNC: 31.8 % (ref 32–36)
MCV RBC AUTO: 86.1 FL (ref 78–100)
MONOCYTES ABSOLUTE: 0.4 K/CU MM
MONOCYTES RELATIVE PERCENT: 8.8 % (ref 0–4)
MUCUS: ABNORMAL HPF
NITRITE URINE, QUANTITATIVE: NEGATIVE
NUCLEATED RBC %: 0 %
PDW BLD-RTO: 14.1 % (ref 11.7–14.9)
PH, URINE: 5 (ref 5–8)
PLATELET # BLD: 181 K/CU MM (ref 140–440)
PMV BLD AUTO: 10.8 FL (ref 7.5–11.1)
POTASSIUM SERPL-SCNC: 3.9 MMOL/L (ref 3.5–5.1)
PROTEIN UA: NEGATIVE MG/DL
RBC # BLD: 4.16 M/CU MM (ref 4.2–5.4)
RBC URINE: 1 /HPF (ref 0–6)
SARS-COV-2, NAAT: NOT DETECTED
SEGMENTED NEUTROPHILS ABSOLUTE COUNT: 1.8 K/CU MM
SEGMENTED NEUTROPHILS RELATIVE PERCENT: 39.8 % (ref 36–66)
SODIUM BLD-SCNC: 139 MMOL/L (ref 135–145)
SOURCE: NORMAL
SPECIFIC GRAVITY UA: 1.03 (ref 1–1.03)
SQUAMOUS EPITHELIAL: 13 /HPF
TOTAL CK: 51 IU/L (ref 26–140)
TOTAL IMMATURE NEUTOROPHIL: 0 K/CU MM
TOTAL NUCLEATED RBC: 0 K/CU MM
TOTAL PROTEIN: 6.4 GM/DL (ref 6.4–8.2)
TRICHOMONAS: ABNORMAL /HPF
TROPONIN T: <0.01 NG/ML
UROBILINOGEN, URINE: NEGATIVE MG/DL (ref 0.2–1)
WBC # BLD: 4.5 K/CU MM (ref 4–10.5)
WBC UA: 6 /HPF (ref 0–5)

## 2021-09-12 PROCEDURE — 85025 COMPLETE CBC W/AUTO DIFF WBC: CPT

## 2021-09-12 PROCEDURE — 87635 SARS-COV-2 COVID-19 AMP PRB: CPT

## 2021-09-12 PROCEDURE — 80053 COMPREHEN METABOLIC PANEL: CPT

## 2021-09-12 PROCEDURE — 82550 ASSAY OF CK (CPK): CPT

## 2021-09-12 PROCEDURE — 84484 ASSAY OF TROPONIN QUANT: CPT

## 2021-09-12 PROCEDURE — 83605 ASSAY OF LACTIC ACID: CPT

## 2021-09-12 PROCEDURE — 83735 ASSAY OF MAGNESIUM: CPT

## 2021-09-12 PROCEDURE — 70450 CT HEAD/BRAIN W/O DYE: CPT

## 2021-09-12 PROCEDURE — 93005 ELECTROCARDIOGRAM TRACING: CPT | Performed by: PHYSICIAN ASSISTANT

## 2021-09-12 PROCEDURE — 99285 EMERGENCY DEPT VISIT HI MDM: CPT

## 2021-09-12 PROCEDURE — 71045 X-RAY EXAM CHEST 1 VIEW: CPT

## 2021-09-12 PROCEDURE — 96372 THER/PROPH/DIAG INJ SC/IM: CPT

## 2021-09-12 PROCEDURE — 96375 TX/PRO/DX INJ NEW DRUG ADDON: CPT

## 2021-09-12 PROCEDURE — 6360000002 HC RX W HCPCS: Performed by: PHYSICIAN ASSISTANT

## 2021-09-12 PROCEDURE — 83690 ASSAY OF LIPASE: CPT

## 2021-09-12 PROCEDURE — 6370000000 HC RX 637 (ALT 250 FOR IP): Performed by: PHYSICIAN ASSISTANT

## 2021-09-12 PROCEDURE — 81001 URINALYSIS AUTO W/SCOPE: CPT

## 2021-09-12 PROCEDURE — 2500000003 HC RX 250 WO HCPCS: Performed by: PHYSICIAN ASSISTANT

## 2021-09-12 PROCEDURE — 96374 THER/PROPH/DIAG INJ IV PUSH: CPT

## 2021-09-12 RX ORDER — HEPARIN SODIUM (PORCINE) LOCK FLUSH IV SOLN 100 UNIT/ML 100 UNIT/ML
100 SOLUTION INTRAVENOUS ONCE
Status: COMPLETED | OUTPATIENT
Start: 2021-09-12 | End: 2021-09-12

## 2021-09-12 RX ORDER — DICYCLOMINE HYDROCHLORIDE 10 MG/ML
20 INJECTION INTRAMUSCULAR ONCE
Status: COMPLETED | OUTPATIENT
Start: 2021-09-12 | End: 2021-09-12

## 2021-09-12 RX ORDER — OXYCODONE HYDROCHLORIDE AND ACETAMINOPHEN 5; 325 MG/1; MG/1
1 TABLET ORAL ONCE
Status: COMPLETED | OUTPATIENT
Start: 2021-09-12 | End: 2021-09-12

## 2021-09-12 RX ORDER — ONDANSETRON 2 MG/ML
4 INJECTION INTRAMUSCULAR; INTRAVENOUS EVERY 30 MIN PRN
Status: DISCONTINUED | OUTPATIENT
Start: 2021-09-12 | End: 2021-09-13 | Stop reason: HOSPADM

## 2021-09-12 RX ORDER — PROMETHAZINE HYDROCHLORIDE 25 MG/ML
25 INJECTION, SOLUTION INTRAMUSCULAR; INTRAVENOUS ONCE
Status: COMPLETED | OUTPATIENT
Start: 2021-09-12 | End: 2021-09-12

## 2021-09-12 RX ORDER — PROMETHAZINE HYDROCHLORIDE 25 MG/1
25 TABLET ORAL EVERY 8 HOURS PRN
Qty: 15 TABLET | Refills: 0 | Status: SHIPPED | OUTPATIENT
Start: 2021-09-12 | End: 2021-09-30 | Stop reason: SDUPTHER

## 2021-09-12 RX ADMIN — HEPARIN 100 UNITS: 100 SYRINGE at 23:19

## 2021-09-12 RX ADMIN — OXYCODONE HYDROCHLORIDE AND ACETAMINOPHEN 1 TABLET: 5; 325 TABLET ORAL at 18:55

## 2021-09-12 RX ADMIN — FAMOTIDINE 20 MG: 10 INJECTION, SOLUTION INTRAVENOUS at 21:23

## 2021-09-12 RX ADMIN — PROMETHAZINE HYDROCHLORIDE 25 MG: 25 INJECTION INTRAMUSCULAR; INTRAVENOUS at 18:55

## 2021-09-12 RX ADMIN — ONDANSETRON 4 MG: 2 INJECTION INTRAMUSCULAR; INTRAVENOUS at 21:23

## 2021-09-12 RX ADMIN — DICYCLOMINE HYDROCHLORIDE 20 MG: 20 INJECTION INTRAMUSCULAR at 21:23

## 2021-09-12 ASSESSMENT — PAIN SCALES - GENERAL
PAINLEVEL_OUTOF10: 6
PAINLEVEL_OUTOF10: 8
PAINLEVEL_OUTOF10: 6

## 2021-09-12 ASSESSMENT — PAIN DESCRIPTION - LOCATION: LOCATION: ABDOMEN

## 2021-09-12 NOTE — CARE COORDINATION
CM review of pt chart for readmission risk, last visit 8/28/21 for ongoing complaint of abd pain. ABD Surgery Hx:  * 2/12/19 sleeve gastrectomy around a  38-[place] bougie + Hiatal hernia primary repair + extensive lysis of her severe extensive adhesions of her omentum with the anterior abdominal wall and the left lobe of the liver to her stomach. * 8/16/21 Gastric Bypass RUFINO-EN-Y, Lysis of Adhesions. By Dr Za Watt MD    Pt has has an on going hx of abd issues dating back to over 10 years. 9/1/21 Last Bariatric visit  post op visit with Dr Za Watt, vomiting and RLQ pain returned a week prior. Today, Pt to ER  with c/o concerns for COVID-19, abd pain, emesis. COVID-19 rapid test is NEGATIVE. Pt treated for N/V, pain. ER provider Jace Marshall.' CM spoke with Marilin Rand who states she is waiting on lab work to result, CT of head done to due pt reporting to  Marilin Rand of seizure today, results of CT negative.     No acute findings for admission, pt discharged home with o/p follow up VANESSA,RN/CM

## 2021-09-12 NOTE — ED PROVIDER NOTES
The Ekg interpreted by me shows  normal sinus rhythm with a rate of 62  Axis is   Normal  QTc is  normal  Intervals and Durations are unremarkable.       ST Segments: flattening in  AVF, V3  No significant change from prior EKG dated 8-           Anthony Reed MD  09/12/21 [id number]

## 2021-09-12 NOTE — ED NOTES
Bed: ED-14  Expected date:   Expected time:   Means of arrival:   Comments:  [address], RN  09/12/21 [id number]

## 2021-09-12 NOTE — ED PROVIDER NOTES
EMERGENCY DEPARTMENT ENCOUNTER    [facility] EMERGENCY DEPARTMENT        TRIAGE CHIEF COMPLAINT:   Concern For COVID-19 (SOB), Abdominal Pain, and Emesis      [place]:  Kendal Aguilera is a 44 y.o. female that presents with fatigue, abdominal pain nausea vomiting diarrhea. Onset is prior to arrival, yesterday but worse today. Contest is patient is status post gastric bypass surgery with Dr. Carmela Zafar x3 weeks ago. States that since her surgery, she has had intermittent lower abdominal pain as well as nausea and vomiting, is on Percocet as well as several different antiemetics, Zofran and Phenergan. Patient states that she was doing well but awoke yesterday feeling overall fatigued and weak. Began having a reported fever 102F. States that she was walking today to the bathroom, began feeling more weak and is not sure if she passed out or had a seizure. She denies any preceding chest pain shortness of breath dizziness or lightheadedness. Denies any tongue biting or intraoral lesions or loss of bowel or bladder control. She is now having increasing upper abdominal pain with persistent nausea and vomiting. Not able to keep her home pain medications or antiemetics down. No pleuritic or exertional chest pain shortness of breath or palpitations prior to her possible syncopal episode. No rash or sore throat. Denying any anticoagulation therapy. Patient does state that she is vaccinated for COVID-19 but is concerned her symptoms may be secondary to infection. Also states her urine has appeared dark without hematuria or dysuria. Does endorse an intermittent cough, dry without shortness of breath or wheezing. States her abdominal pain feels similar to when she has had pancreatitis flares. Denies any alcohol use or excessive NSAID use.       ROS:  General:  No fevers, no chills   Cardiovascular:  No chest pain, no palpitations  Respiratory:  No shortness of breath, +cough, no wheezing  Gastrointestinal:  See HPI    Musculoskeletal:  No muscle pain, no joint pain  Skin:  No rash, no pruritis, no easy bruising  Neurologic:  No speech problems, no headache, no extremity numbness, no extremity tingling, no extremity weakness  Psychiatric:  No anxiety  Genitourinary:  No dysuria, no hematuria  Extremities:  No edema    Past Medical History:   Diagnosis Date    Acid reflux     Anemia     Anxiety     Arthritis     Hands, Back And Ankles    Bleeding ulcer 2014    \"I Had Ulcers In My Stomach And Colon\"/ per pt on 8/12/2019\"they said recently having some blood in my stomach- in July ( 2019)could not find where coming from \"    Bronchitis Last Episode 2014    Chronic back pain     [facility]     [facility]    COPD (chronic obstructive pulmonary disease) ([address]     Sees Dr. Jcarlos Dockery Depression     Fibromyalgia Dx 2013    GERD (gastroesophageal reflux disease)     H/O echocardiogram 08/11/2015    EF >55%. LA to be at the upper limit of normal in size. LV hypertrophy with normal LV systolic, but abnormal diastolic function. Normal valvular structures and function.  H/O echocardiogram 08/30/2018    EF 55-60%    Hiatal hernia     History of blood transfusion 09/2015 And 2018    No Reaction To Blood Transfusions Received    History of sleeve gastrectomy     [place] (hard of hearing)     Right Ear    Hx of cardiac catheterization 10/24/2010    Angiographically normal coronary arteries w/ normal LV function and wall motion.  Hx of transesophageal echocardiography (PILO) for monitoring 12/02/2010    EF 55-60%. WNL.    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 name   [facility] Kidney cysts     Maury Manzanares found that I have a kidney cyst but not sure which side\" per pt on 7/15/2020    Lupus ([facility] [address] Dx 2013    \"Rheumatoid Lupus\"    MDRO (multiple drug resistant organisms) resistance     [facility]    Morbid obesity ([facility] [address]     MRSA bacteremia     Nausea     \"Most Of The Time\"    Osteomyelitis of fifth toe of left foot (HCC)     Pain management     Sees Dr. Alem Last, Once A Month    Pancreatitis chronic Dx 2001    Pneumonia Dx 11-15    Seizures ([facility] [address]     Shortness of breath on exertion     Shortness of breath on exertion     Sleep apnea     Has CPAP\"no longer use the cpap since lost weight\"    Staph infection Dx 11-15    Left Foot    Staph infection Dx 11-15    \"Left Foot\"    Teeth missing     Upper And Lower    Thyroid disease     Wears glasses     To Read     Past Surgical History:   Procedure Laterality Date    APPENDECTOMY  02/1998    Done When Tubes And Ovaries Were Removed    CARDIAC CATHETERIZATION  10/24/2010    CATHETER REMOVAL N/A 7/16/2019    PORT REMOVAL performed by Nora Dyson MD at [address] [facility]  08/27/1991    CHOLECYSTECTOMY, LAPAROSCOPIC  1990's    COLONOSCOPY  Last Done In 2000's    DENTAL SURGERY      Teeth Extracted In Past    ENDOSCOPY, COLON, DIAGNOSTIC  Last Done In 2018    FOOT DEBRIDEMENT Left 6/16/2019    FIRST METATARSAL DEBRIDEMENT INCISION AND DRAINAGE. EXCISION OF ULCER.  RESECTION OF BONE. 1ST METATARSAL POWER LAVAGE AND BONE CEMENT performed by Susie Sanford DPM at [address] Left Last Done In 2016     Dr. Charo Butcher, \" About 6 Surgeries Done Because Of Staph Infection\"    HIATAL HERNIA REPAIR N/A 2/12/2019    HERNIA HIATAL LAPAROSCOPIC ROBOTIC performed by Nora Dyson MD at [address] [place] [address]  10/1997    Partial Abdominal Hysterectomy    INSERTION / REMOVAL / REPLACEMENT VENOUS ACCESS CATHETER N/A 8/15/2019    PORT INSERTION performed by Nora Dyson MD at [place] [address] LAP BAND  ~2000    removed after 6 months    LEG BIOPSY EXCISION Left 3/8/2021    LEFT THIGH LESION BIOPSY EXCISION performed by Justin Wang MD at [facility], PARTIAL Left 2008    Benign    OTHER SURGICAL HISTORY  02/1998    \"Tubes And Ovaries Removed, Appendectomy Also Done\"    OTHER SURGICAL HISTORY  Last Done 7-15-16    Mediport Insertion \"Total Of Six Done, Removed Last Mediport In 2014\"    PORT SURGERY N/A 1/15/2020    PORT REMOVAL performed by Justin Wang MD at [facility] N/A 7/6/2020    PORT INSERTION performed by Justin Wang MD at [address] [facility] [address] Left 8/3/2021    MEDIPORT REPLACEMENT performed by Justin Wang MD at [address] [place] [address] N/A 8/16/2021    GASTRIC BYPASS RUFINO-EN-Y LAPAROSCOPIC ROBOTIC, LYSIS OF ADHESIONS performed by Justin Wang MD at [address] [facility] N/A 2/12/2019    GASTRECTOMY SLEEVE LAPAROSCOPIC ROBOTIC performed by Justin Wang MD at [facility]  08/27/2018    UPPER GASTROINTESTINAL ENDOSCOPY N/A 4/2/2019    EGD CONTROL HEMORRHAGE with epi injection at bleeding site performed by Justin Wang MD at [address] 6/19/2019    EGD DIAGNOSTIC ONLY performed by Justin Wang MD at [address] N/A 7/22/2019    EGD DIAGNOSTIC ONLY performed by Aiden Antonio MD at [address] 10/14/2019    EGD DIAGNOSTIC ONLY performed by Justin Wang MD at [address] N/A 7/17/2020    EGJ DILATATION BALLOON WITH 18-20 MM BALLOON, DILATED TO 20 MM. performed by Justin Wang MD at [address] 5/28/2021    EGD BIOPSY performed by Justin Wang MD at [facility] ENDOSCOPY     Family History   Problem Relation Age of Onset    Diabetes Mother         \"Borderline Diabetes\"    High Blood Pressure Mother     Obesity Mother     Arthritis Mother     Heart Disease Mother     High Cholesterol Mother     Vision Loss Mother     Diabetes Father     High Blood Pressure Father     Asthma Father     Cancer Father         prostate cancer    High Blood Pressure Brother     Asthma Son     Vision Loss Son     Lupus Daughter     Other Daughter         \"Alot Of Female Problems\"     Social History     Socioeconomic History    Marital status:      Spouse name: Not on file    Number of children: Not on file    Years of education: Not on file    Highest education level: Not on file   Occupational History    Not on file   Tobacco Use    Smoking status: Never Smoker    Smokeless tobacco: Never Used   Vaping Use    Vaping Use: Never used   Substance and Sexual Activity    Alcohol use: No     Alcohol/week: 0.0 standard drinks    Drug use: No    Sexual activity: Not Currently   Other Topics Concern    Not on file   Social History Narrative    Not on file     Social Determinants of Health     Financial Resource Strain:     Difficulty of Paying Living Expenses:    Food Insecurity:     Worried About Running Out of Food in the Last Year:     Ran Out of Food in the Last Year:    Transportation Needs:     Lack of Transportation (Medical):      Lack of Transportation (Non-Medical):    Physical Activity:     Days of Exercise per Week:     Minutes of Exercise per Session:    Stress:     Feeling of Stress :    Social Connections:     Frequency of Communication with Friends and Family:     Frequency of Social Gatherings with Friends and Family:     Attends Restoration[employer] Services:     Active Member of Clubs or Organizations:     Attends Club or Organization Meetings:     Marital Status:    Intimate Partner Violence:     Fear of Current or Ex-Partner:     Emotionally Abused:     Physically Abused:     Sexually Abused: No current facility-administered medications for this encounter.      Current Outpatient Medications   Medication Sig Dispense Refill    promethazine (PHENERGAN) 25 MG tablet Take 1 tablet by mouth every 8 hours as needed for Nausea 15 tablet 0    albuterol (PROVENTIL) (2.5 MG/3ML) 0.083% nebulizer solution Take 3 mLs by nebulization every 6 hours as needed for Wheezing 120 each 0    albuterol sulfate  (90 Base) MCG/ACT inhaler Inhale 2 puffs into the lungs 4 times daily 1 each 0    oxyCODONE-acetaminophen (PERCOCET) 5-325 MG per tablet TAKE 1 TABLET BY MOUTH EVERY 6 HOURS AS NEEDED FOR 28 DAYS      NARCAN 4 MG/0.1ML LIQD nasal spray USE AS DIRECTED AS NEEDED FOR SUSPECTED OPIOID OVERDOSE      hydrOXYzine (VISTARIL) 50 MG capsule Take 1 capsule by mouth every 6 hours as needed for Anxiety 60 capsule 0    pantoprazole (PROTONIX) 40 MG tablet Take 1 tablet by mouth 2 times daily 60 tablet 3    ondansetron (ZOFRAN ODT) 4 MG disintegrating tablet Take 1 tablet by mouth every 4 hours as needed for Nausea or Vomiting 30 tablet 3    scopolamine (TRANSDERM-SCOP, 1.5 MG,) transdermal patch Place 1 patch onto the skin every 72 hours 10 patch 11    scopolamine (TRANSDERM-SCOP) transdermal patch APPLY 1 PATCH TRANSDERMALLY TO THE SKIN BEHIND THE EAR ONCE EVERY 3 DAYS AS NEEDED 10 patch 0    ondansetron (ZOFRAN ODT) 4 MG disintegrating tablet Take 1 tablet by mouth every 8 hours as needed for Nausea 15 tablet 0    ondansetron (ZOFRAN ODT) 4 MG disintegrating tablet Take 1 tablet by mouth every 8 hours as needed for Nausea 15 tablet 0    Melatonin 10 MG TABS Take 10-20 mg by mouth nightly as needed      Cyanocobalamin (VITAMIN B 12 PO) Take 1 tablet by mouth daily      meclizine (ANTIVERT) 25 MG tablet Take 25 mg by mouth 3 times daily as needed for Dizziness      levETIRAcetam (KEPPRA) 1000 MG tablet Take 1 tablet by mouth 2 times daily 60 tablet 3    levothyroxine (SYNTHROID) 125 MCG tablet Take 1 tablet by mouth Daily 30 tablet 3    atenolol (TENORMIN) 25 MG tablet Take 1 tablet by mouth daily 30 tablet 3    cloNIDine (CATAPRES) 0.1 MG tablet Take 1 tablet by mouth 2 times daily (Patient taking differently: Take 0.1 mg by mouth 2 times daily as needed for High Blood Pressure 05/27/21 Patient states she only takes as needed usually if SBP>150) 60 tablet 3    PARoxetine (PAXIL) 30 MG tablet Take 1.5 tablets by mouth nightly (Patient taking differently: Take 30 mg by mouth nightly ) 30 tablet 3    betamethasone valerate (VALISONE) 0.1 % ointment APPLY OINTMENT TO AFFECTED AREA TWICE DAILY TO HANDS AND ELBOWS FOR 21 DAYS      potassium gluconate 550 mg tablet Take 1 tablet by mouth daily      tiZANidine (ZANAFLEX) 4 MG tablet Take 4 mg by mouth every 8 hours as needed       ferrous sulfate (IRON 325) 325 (65 Fe) MG tablet Take 1 tablet by mouth daily (with breakfast) 90 tablet 5    Cholecalciferol (VITAMIN D3) 5000 units TABS Take 1 tablet by mouth daily      Multiple Vitamin (MVI, BARIATRIC ADVANTAGE MULTI-FORMULA, CHEW TAB) Take 1 tablet by mouth daily 30 tablet 5    Calcium Citrate-Vitamin D (CALCIUM + VIT D, BARIATRIC ADVANTAGE, CHEWABLE TABLET) Take 1 tablet by mouth 2 times daily 120 tablet 5    gabapentin (NEURONTIN) 800 MG tablet Take 800 mg by mouth 3 times daily       Allergies   Allergen Reactions    Aspirin Palpitations     \"My Heart Rate Elevates\"    Compazine [Prochlorperazine] Rash    Shellfish-Derived Products Swelling    Toradol [Ketorolac Tromethamine] Rash    Haldol [Haloperidol]      Shaking      Reglan [Metoclopramide] Itching       Nursing Notes Reviewed  PHYSICAL EXAM    VITAL SIGNS: /61   Pulse 61   Temp 98.4 °F (36.9 °C) (Oral)   Resp 19   Ht 5' 4\" (1.626 m)   Wt 268 lb (121.6 kg)   SpO2 98%   BMI 46.00 kg/m²    Constitutional:  Well developed, elevated BMI, sitting upright in bed in no acute distress, nontoxic  Head:  Normocephalic, Atraumatic  Eyes: PERRL. EOMI.  Sclera clear. Conjunctiva normal, No discharge. Neck/Lymphatics: Supple, no JVD, No lymphadenopathy  Cardiovascular:  RRR, Normal S1 & S2   Peripheral Vascular: Distal pulses 2+, Capillary refill <2seconds  Respiratory:  Respirations nonnlabored, Clear to auscultation bilaterally, No retractions  Abdomen: Well-healed surgical incisions. Bowel sounds normal in all quadrants, Soft, nondistended abdomen. Mild tenderness across the upper abdomen epigastric region without rebound or guarding. No palpable abdominal masses. Hepatosplenomegaly. No CVA tenderness. Musculoskeletal: BUE/BLE symmetrical without atrophy or deformities  Integument:  Warm, Dry, Intact, Skin turgor and texture normal  Neurologic:  Alert & oriented x3 , No focal deficits noted. Cranial nerves II through XII grossly intact. No slurred speech. No facial droop. Continuously moving all extremities. Normal gross motor coordination & motor strength bilateral upper and lower extremities.     Psychiatric:  Affect appropriate      I have reviewed and interpreted all of the currently available lab results from this visit (if applicable):  Results for orders placed or performed during the hospital encounter of 09/12/21   COVID-19, Rapid    Specimen: Nasopharyngeal   Result Value Ref Range    Source THROAT     SARS-CoV-2, NAAT NOT DETECTED NOT DETECTED   CBC auto diff   Result Value Ref Range    WBC 4.5 4.0 - 10.5 K/CU MM    RBC 4.16 (L) 4.2 - 5.4 M/CU MM    Hemoglobin 11.4 (L) 12.5 - 16.0 GM/DL    Hematocrit 35.8 (L) 37 - 47 %    MCV 86.1 78 - 100 FL    MCH 27.4 27 - 31 PG    MCHC 31.8 (L) 32.0 - 36.0 %    RDW 14.1 11.7 - 14.9 %    Platelets 862 780 - 230 K/CU MM    MPV 10.8 7.5 - 11.1 FL    Differential Type AUTOMATED DIFFERENTIAL     Segs Relative 39.8 36 - 66 %    Lymphocytes % 34.4 24 - 44 %    Monocytes % 8.8 (H) 0 - 4 %    Eosinophils % 16.1 (H) 0 - 3 %    Basophils % 0.9 0 - 1 %    Segs Absolute 1.8 K/CU MM    Lymphocytes Absolute 1.6 K/CU MM    Monocytes Absolute 0.4 K/CU MM    Eosinophils Absolute 0.7 K/CU MM    Basophils Absolute 0.0 K/CU MM    Nucleated RBC % 0.0 %    Total Nucleated RBC 0.0 K/CU MM    Total Immature Neutrophil 0.00 K/CU MM    Immature Neutrophil % 0.0 0 - 0.43 %   CMP   Result Value Ref Range    Sodium 139 135 - 145 MMOL/L    Potassium 3.9 3.5 - 5.1 MMOL/L    Chloride 105 99 - 110 mMol/L    CO2 25 21 - 32 MMOL/L    BUN 15 6 - 23 MG/DL    CREATININE 0.8 0.6 - 1.1 MG/DL    Glucose 107 (H) 70 - 99 MG/DL    Calcium 10.3 8.3 - 10.6 MG/DL    Albumin 4.0 3.4 - 5.0 GM/DL    Total Protein 6.4 6.4 - 8.2 GM/DL    Total Bilirubin 0.3 0.0 - 1.0 MG/DL    ALT 16 10 - 40 U/L    AST 20 15 - 37 IU/L    Alkaline Phosphatase 98 40 - 128 IU/L    GFR Non-African American >60 >60 mL/min/1.73m2    GFR African American >60 >60 mL/min/1.73m2    Anion Gap 9 4 - 16   Lipase   Result Value Ref Range    Lipase 15 13 - 60 IU/L   Lactic Acid, Plasma   Result Value Ref Range    Lactate 0.9 0.4 - 2.0 mMOL/L   Urinalysis   Result Value Ref Range    Color, UA YELLOW YELLOW    Clarity, UA HAZY (A) CLEAR    Glucose, Urine NEGATIVE NEGATIVE MG/DL    Bilirubin Urine NEGATIVE NEGATIVE MG/DL    Ketones, Urine NEGATIVE NEGATIVE MG/DL    Specific Gravity, UA 1.026 1.001 - 1.035    Blood, Urine NEGATIVE NEGATIVE    pH, Urine 5.0 5.0 - 8.0    Protein, UA NEGATIVE NEGATIVE MG/DL    Urobilinogen, Urine NEGATIVE 0.2 - 1.0 MG/DL    Nitrite Urine, Quantitative NEGATIVE NEGATIVE    Leukocyte Esterase, Urine NEGATIVE NEGATIVE    RBC, UA 1 0 - 6 /HPF    WBC, UA 6 (H) 0 - 5 /HPF    Bacteria, UA OCCASIONAL (A) NEGATIVE /HPF    Squam Epithel, UA 13 /HPF    Mucus, UA RARE (A) NEGATIVE HPF    Trichomonas, UA NONE SEEN NONE SEEN /HPF    Hyaline Casts, UA 5 /LPF   Magnesium   Result Value Ref Range    Magnesium 2.1 1.8 - 2.4 mg/dl   Troponin   Result Value Ref Range    Troponin T <0.010 <0.01 NG/ML   CK   Result Value Ref Range    Total CK 51 26 - 140 IU/L   EKG 12 Lead   Result Value Ref Range    Ventricular Rate 62 BPM    Atrial Rate 62 BPM    P-R Interval 186 ms    QRS Duration 100 ms    Q-T Interval 404 ms    QTc Calculation (Bazett) 410 ms    P Axis 54 degrees    R Axis -21 degrees    T Axis 34 degrees    Diagnosis       Normal sinus rhythm  Normal ECG  When compared with ECG of 21-AUG-2021 19:19,  QRS duration has increased  ST no longer depressed in Inferior leads  T wave inversion no longer evident in Inferior leads  T wave inversion no longer evident in Lateral leads  QT has shortened          Radiographs (if obtained):  [] The following radiograph was interpreted by myself in the absence of a radiologist:   [] Radiologist's Report Reviewed:  CT HEAD WO CONTRAST   Final Result   No acute intracranial abnormality. XR CHEST PORTABLE   Final Result   No acute cardiopulmonary abnormality. CT HEAD WO CONTRAST (Final result)  Result time 09/12/21 89:54:58  Final result by Cj Marcelo MD (09/12/21 84:15:28)                Impression:    No acute intracranial abnormality. Narrative:    EXAMINATION:   CT OF THE HEAD WITHOUT CONTRAST  9/12/2021 7:45 pm     TECHNIQUE:   CT of the head was performed without the administration of intravenous   contrast. Dose modulation, iterative reconstruction, and/or weight based   adjustment of the mA/kV was utilized to reduce the radiation dose to as low   as reasonably achievable. COMPARISON:   02/11/2021     HISTORY:   ORDERING SYSTEM PROVIDED HISTORY: syncope versus seizure   TECHNOLOGIST PROVIDED HISTORY:   Has a \"code stroke\" or \"stroke alert\" been called? ->No   Reason for exam:->syncope versus seizure   Decision Support Exception - unselect if not a suspected or confirmed   emergency medical condition->Emergency Medical Condition (MA)   Is the patient pregnant?->No   Reason for Exam: syncope versus seizure     FINDINGS:   BRAIN/VENTRICLES: There is no acute intracranial hemorrhage, mass effect or   midline shift.  No abnormal extra-axial fluid collection.  The gray-white   differentiation is maintained without evidence of an acute infarct.  There is   no evidence of hydrocephalus. ORBITS: The visualized portion of the orbits demonstrate no acute abnormality. SINUSES: The visualized paranasal sinuses and mastoid air cells demonstrate   no acute abnormality. SOFT TISSUES/SKULL:  No acute abnormality of the visualized skull or soft   tissues.                     XR CHEST PORTABLE (Final result)  Result time 09/12/21 18:57:20  Final result by Michael Bass MD (09/12/21 18:57:20)                Impression:    No acute cardiopulmonary abnormality. Narrative:    EXAMINATION:   ONE XRAY VIEW OF THE CHEST     9/12/2021 6:43 pm     COMPARISON:   08/03/2021     HISTORY:   ORDERING SYSTEM PROVIDED HISTORY: SOB, fever   TECHNOLOGIST PROVIDED HISTORY:   Reason for exam:->SOB, fever   Reason for Exam: SOB, fever   Acuity: Acute   Type of Exam: Initial     FINDINGS:   The lungs are clear other than stable minimal lingular atelectasis/scarring. The cardiac and mediastinal contours are normal.  There is no pleural   effusion or pneumothorax. No acute osseous abnormality is identified. Left   chest port tip is in the SVC.                         EKG Interpretation  Please see ED physician's note - Dr. Nicole Valdes - for EKG interpretation        Chart review shows recent radiographs:  CT ABDOMEN PELVIS W IV CONTRAST Additional Contrast? None    Result Date: 8/29/2021  EXAMINATION: CT OF THE ABDOMEN AND PELVIS WITH CONTRAST, 8/28/2021 9:12 am TECHNIQUE: CT of the abdomen and pelvis was performed with the administration of intravenous contrast. Multiplanar reformatted images are provided for review. Dose modulation, iterative reconstruction, and/or weight based adjustment of the mA/kV was utilized to reduce the radiation dose to as low as reasonably achievable.  COMPARISON: 08/24/2021 HISTORY: ORDERING SYSTEM PROVIDED HISTORY:  R sided abd pain, s/p gastric bypass 2 weeks ago, ? soft tissue thickening on prior CT 4 days ago, symptoms worsening. TECHNOLOGIST PROVIDED HISTORY: Reason for Exam:  R sided abd pain, s/p gastric bypass 2 weeks ago, ? soft tissue thickening on prior CT 4 days ago, symptoms worsening. Additional Contrast?  None Decision Support Exception - unselect if not a suspected or confirmed emergency medical condition->Emergency Medical Condition (MA). Reason for Exam:  R sided abd pain, s/p gastric bypass 2 weeks ago, ? soft tissue thickening on prior CT 4 days ago, symptoms worsening. Acuity:  Acute Type of Exam:  Initial FINDINGS: Lower Chest: Visualized portion of the lower chest demonstrates no acute abnormality. Organs: The liver, spleen, pancreas, adrenal glands and kidneys demonstrate no acute abnormality. Status post cholecystectomy. Intrabiliary gas and pancreatic duct gas likely from sphincterotomy. GI/Bowel: Postoperative changes are present from Jet-en-Y gastric bypass. No dilated loops to suggest bowel obstruction. No evidence of acute bowel inflammation. Pelvis: Trace pelvic ascites. No pelvic mass, adenopathy, or fluid collection. Peritoneum/Retroperitoneum: Small amount of edema in the omentum associated with the recent surgery. No abscess. No free intraperitoneal air. Mesenteric vasculature is patent. Abdominal aorta is normal caliber. Bones/Soft Tissues: No acute osseous abnormality. Postoperative changes recent Jet-en-Y gastric bypass. Findings are similar to the recent CT. No abscess. No acute abnormality. CT ABDOMEN PELVIS W IV CONTRAST Additional Contrast? None    Result Date: 8/24/2021  EXAMINATION: CT OF THE ABDOMEN AND PELVIS WITH CONTRAST, 8/24/2021 2:07 pm TECHNIQUE: CT of the abdomen and pelvis was performed with the administration of intravenous contrast. Multiplanar reformatted images are provided for review.  Dose modulation, iterative reconstruction, and/or weight based adjustment of the mA/kV was utilized to reduce the radiation dose to as low as reasonably achievable. COMPARISON: July 2, 2021 HISTORY: ORDERING SYSTEM PROVIDED HISTORY: right lower abdominal pain , history of gastric by pass and lysis of adhesions TECHNOLOGIST PROVIDED HISTORY: Reason for exam:->right lower abdominal pain , history of gastric by pass and lysis of adhesions Additional Contrast?->None Decision Support Exception - unselect if not a suspected or confirmed emergency medical condition->Emergency Medical Condition (MA) Reason for Exam: right lower abdominal pain , history of gastric by pass and lysis of adhesions Acuity: Acute Type of Exam: Initial FINDINGS: Lower Chest: No active disease. Calcified granulomata. Noncalcified nodules in the left lower lobe remains stable. Organs: The liver demonstrates air in the biliary system also previously seen likely related to previous sphincterotomy. Status post cholecystectomy. Pancreas appears unremarkable. There is splenomegaly with the spleen measuring 16 cm. Previously the spleen measured 15 cm. Adrenals and kidneys appear unremarkable. Aorta and IVC appear unremarkable. GI/Bowel: Status post gastric bypass. No evidence of bowel obstruction or perforation. Pelvis: Status post hysterectomy. Urinary bladder and UV junctions appear unremarkable. Peritoneum/Retroperitoneum: No evidence of lymphadenopathyScattered nodes are seen. Some mesenteric inflammatory changes in the anterior mesentery but no evidence of abscess. Bones/Soft Tissues: Areas of subcutaneous soft tissue thickening which was not previously seen as well as skin thickening may indicate cellulitis. No evidence of destructive bony changes. 1. Subcutaneous soft tissue thickening as well as skin thickening may indicates cellulitis. 2. Mild peritoneal inflammation without abscess. Status post gastric bypass. 3. Splenomegaly more pronounced.  4. Redemonstration of air in the biliary system also previously seen likely related to sphincterotomy. FL UGI W SMALL BOWEL    Result Date: 8/17/2021  EXAMINATION: SINGLE CONTRAST UPPER GI SERIES 8/17/2021 HISTORY: ORDERING SYSTEM PROVIDED HISTORY: WATER SOLUBLE CONTRAST please - r/o gastro-jejunal anastomotic leak - DO NOT OVER STRETCH the pouch please TECHNOLOGIST PROVIDED HISTORY: Reason for exam:->WATER SOLUBLE CONTRAST please - r/o gastro-jejunal anastomotic leak - DO NOT OVER STRETCH the pouch please Reason for Exam: WATER SOLUBLE CONTRAST please - r/o gastro-jejunal anastomotic leak - DO NOT OVER STRETCH the pouch please COMPARISON: None. TECHNIQUE: Multiple single contrast images of the esophagus, gastroesophageal junction and stomach were obtained following the oral administration of water soluble contrast FLUOROSCOPY DOSE AND TYPE OR TIME AND EXPOSURES: Fluoro time 1.6 minutes. D AP 1276. 11. Entrance dose 103.40 mGy. FINDINGS: On the single contrast images, no evidence of stricture or obstruction. The esophagus demonstrates normal peristalsis under fluoroscopy. Status post Jet-en-Y gastric bypass. Expected edema at the [address] and JJ anastomoses. No evidence of leak. No hiatal hernia is seen and there is no evidence of achalasia. No gastroesophageal reflux was elicited during examination. No leak or obstruction. XR CHEST PORTABLE    Result Date: 9/12/2021  EXAMINATION: ONE XRAY VIEW OF THE CHEST 9/12/2021 6:43 pm COMPARISON: 08/03/2021 HISTORY: ORDERING SYSTEM PROVIDED HISTORY: SOB, fever TECHNOLOGIST PROVIDED HISTORY: Reason for exam:->SOB, fever Reason for Exam: SOB, fever Acuity: Acute Type of Exam: Initial FINDINGS: The lungs are clear other than stable minimal lingular atelectasis/scarring. The cardiac and mediastinal contours are normal.  There is no pleural effusion or pneumothorax. No acute osseous abnormality is identified. Left chest port tip is in the SVC. No acute cardiopulmonary abnormality.        ED COURSE & MEDICAL DECISION MAKING       Vital signs and nursing notes reviewed during ED course. I have independently evaluated this patient . Supervising physician - Dr. Celia Patton - was present in ED and available for consultation throughout entirety of patient's care. All pertinent Lab data and radiographic results reviewed with patient at bedside. The patient and/or the family were informed of the results of any tests/labs/imaging, the treatment plan, and time was allotted to answer questions. Clinical Impression:  1. Abdominal pain, epigastric    2. Non-intractable vomiting with nausea, unspecified vomiting type    3. Loss of consciousness ([address]    4. Cough        Patient presents with concern for syncopal episode versus seizure with concern for Covid, abdominal pain nausea vomiting diarrhea dehydration. On exam, well-appearing 49-year-old female, sitting upright in bed, no acute distress, spontaneously moving all extremities. Denying any chest pain or shortness of breath or headache complaints at this time. Lungs are clear to auscultation. Abdomen soft nondistended with tenderness across the epigastric region without rebound or guarding. No hepatosplenomegaly. No CVA tenderness. Well-healed surgical incisions. IM Phenergan and Percocet ordered in addition to labs, chest x-ray and CT head. EKG shows flattening of aVF V3, no acute ischemic changes. CBC with normal white count, hemoglobin 11.4, uptrending compared to previous. CMP with glucose 107, no other electrolyte disturbance. Lipase, CK, troponin magnesium within normal limits. Lactic is normal.  Rapid Covid is not detectable. UA shows 6 white blood cells, occasional bacteria but sample is contaminated with 13 squamous epithelial cells, negative for leukocytes and nitrites. Chest x-ray shows no evidence of acute cardiopulmonary process or consolidation. CT head also negative for evidence of acute intracranial process.   Following initially medications, patient continues to endorse abdominal pain and nausea. Did order Pepcid, Bentyl and IV Zofran. On chart review, patient has been seen in the last month several times for some abdominal pain, has had 2 negative CT abdomen pelvis scans with contrast since her recent gastric bypass surgery. At this time, do feel risks outweigh the benefits of additional CT imaging given radiation risk factors. We will plan on medicating patient for symptomatic relief, consult with her general surgeon for further recommendations or need for repeat/additional imaging versus close follow-up outpatient. Low clinical suspicion for acute pancreatitis given her normal lipase. Also low clinical suspicion for acute seizure given her normal lactic and no endorsement of any postictal signs or symptoms. With normal troponin and EKG, and no active chest pain or shortness of breath, low clinical suspicion for malignant tachyarrhythmia, ACS, dissection, PE.       9/12/21 2120pm I have signed out [address] Emergency Department care to JLUIS Escobedo PA-C. We discussed the history, physical exam, completed/pending test results (if obtained) and current treatment plan. Please refer to his/her chart for the patients further details, remaining Emergency Department course, final disposition and diagnosis. At time of signout, pending reassessment after medications and consult to general surgeon for recommendations. Disposition referral (if applicable):   Diana Sanders MD  [address] [facility]  [phone]    Call today  Recheck as soon as possible    Ayush Garcia MD  [address] [place] [address] [phone]    Call   Recheck in 2-3 days    [facility] Emergency Department  [facility] [phone]  Go to   Return to ED if symptoms worsen or new symptoms      Disposition medications (if applicable):  Discharge Medication List as of 9/12/2021 10:51 PM            (Please note that portions of this note may have been completed with a voice recognition program. Efforts were made to edit the dictations but occasionally words are mis-transcribed.)          Rhina Powell PA-C  09/13/21 [id number]

## 2021-09-13 LAB
EKG ATRIAL RATE: 62 BPM
EKG DIAGNOSIS: NORMAL
EKG P AXIS: 54 DEGREES
EKG P-R INTERVAL: 186 MS
EKG Q-T INTERVAL: 404 MS
EKG QRS DURATION: 100 MS
EKG QTC CALCULATION (BAZETT): 410 MS
EKG R AXIS: -21 DEGREES
EKG T AXIS: 34 DEGREES
EKG VENTRICULAR RATE: 62 BPM

## 2021-09-13 PROCEDURE — 93010 ELECTROCARDIOGRAM REPORT: CPT | Performed by: INTERNAL MEDICINE

## 2021-09-13 NOTE — ED NOTES
Discharge instructions understood as stated by patient. All questions answered.      Mony Blank, RN  09/12/21 4310

## 2021-09-13 NOTE — ED PROVIDER NOTES
9:23 PM EDT  Noris Bagley was checked out to me by OSMANI Chopra. Please see her initial documentation for details of the patient's ED presentation, physical exam and completed studies. At time of patient signout, consult to general surgery is pending. In brief, Noris Bagley presented for nausea, vomiting, diarrhea, and abdominal pain. Patient reports that she had a fever and was concerned for COVID-19. She reports that her urine was also dark and she was concerned that she may be dehydrated. She reports a dry cough without shortness of breath or wheezing. Patient reports that her abdominal pain is similar to prior pancreatitis flares. See HPI and nursing notes for additional information       PHYSICAL EXAM    VITAL SIGNS: BP 99/70   Pulse 60   Temp 98.8 °F (37.1 °C) (Oral)   Resp 16   Ht 5' 4\" (1.626 m)   Wt 268 lb (121.6 kg)   SpO2 98%   BMI 46.00 kg/m²    Constitutional:  Well developed, Well nourished  HENT:  Normocephalic, Atraumatic. Sclera clear. Conjunctiva normal, No discharge. Neck: supple, no JVD  Cardiovascular:  Rate regular  Respiratory:  Nonlabored breathing. Abdomen:  No distension. Musculoskeletal:    Spontaneously moves extremities  Integument:  Warm, Dry  Neurologic: Alert & oriented, No focal deficits noted. Spontaneously moves extremities. No facial droop.   Psychiatric:  Affect normal, Mood normal.       I have reviewed and interpreted all of the currently available lab/imaging results from this visit (if applicable):  LABS:  Results for orders placed or performed during the hospital encounter of 09/12/21   COVID-19, Rapid    Specimen: Nasopharyngeal   Result Value Ref Range    Source THROAT     SARS-CoV-2, NAAT NOT DETECTED NOT DETECTED   CBC auto diff   Result Value Ref Range    WBC 4.5 4.0 - 10.5 K/CU MM    RBC 4.16 (L) 4.2 - 5.4 M/CU MM    Hemoglobin 11.4 (L) 12.5 - 16.0 GM/DL    Hematocrit 35.8 (L) 37 - 47 %    MCV 86.1 78 - 100 FL    MCH 27.4 27 - 31 PG MCHC 31.8 (L) 32.0 - 36.0 %    RDW 14.1 11.7 - 14.9 %    Platelets 754 553 - 376 K/CU MM    MPV 10.8 7.5 - 11.1 FL    Differential Type AUTOMATED DIFFERENTIAL     Segs Relative 39.8 36 - 66 %    Lymphocytes % 34.4 24 - 44 %    Monocytes % 8.8 (H) 0 - 4 %    Eosinophils % 16.1 (H) 0 - 3 %    Basophils % 0.9 0 - 1 %    Segs Absolute 1.8 K/CU MM    Lymphocytes Absolute 1.6 K/CU MM    Monocytes Absolute 0.4 K/CU MM    Eosinophils Absolute 0.7 K/CU MM    Basophils Absolute 0.0 K/CU MM    Nucleated RBC % 0.0 %    Total Nucleated RBC 0.0 K/CU MM    Total Immature Neutrophil 0.00 K/CU MM    Immature Neutrophil % 0.0 0 - 0.43 %   CMP   Result Value Ref Range    Sodium 139 135 - 145 MMOL/L    Potassium 3.9 3.5 - 5.1 MMOL/L    Chloride 105 99 - 110 mMol/L    CO2 25 21 - 32 MMOL/L    BUN 15 6 - 23 MG/DL    CREATININE 0.8 0.6 - 1.1 MG/DL    Glucose 107 (H) 70 - 99 MG/DL    Calcium 10.3 8.3 - 10.6 MG/DL    Albumin 4.0 3.4 - 5.0 GM/DL    Total Protein 6.4 6.4 - 8.2 GM/DL    Total Bilirubin 0.3 0.0 - 1.0 MG/DL    ALT 16 10 - 40 U/L    AST 20 15 - 37 IU/L    Alkaline Phosphatase 98 40 - 128 IU/L    GFR Non-African American >60 >60 mL/min/1.73m2    GFR African American >60 >60 mL/min/1.73m2    Anion Gap 9 4 - 16   Lipase   Result Value Ref Range    Lipase 15 13 - 60 IU/L   Lactic Acid, Plasma   Result Value Ref Range    Lactate 0.9 0.4 - 2.0 mMOL/L   Urinalysis   Result Value Ref Range    Color, UA YELLOW YELLOW    Clarity, UA HAZY (A) CLEAR    Glucose, Urine NEGATIVE NEGATIVE MG/DL    Bilirubin Urine NEGATIVE NEGATIVE MG/DL    Ketones, Urine NEGATIVE NEGATIVE MG/DL    Specific Gravity, UA 1.026 1.001 - 1.035    Blood, Urine NEGATIVE NEGATIVE    pH, Urine 5.0 5.0 - 8.0    Protein, UA NEGATIVE NEGATIVE MG/DL    Urobilinogen, Urine NEGATIVE 0.2 - 1.0 MG/DL    Nitrite Urine, Quantitative NEGATIVE NEGATIVE    Leukocyte Esterase, Urine NEGATIVE NEGATIVE    RBC, UA 1 0 - 6 /HPF    WBC, UA 6 (H) 0 - 5 /HPF    Bacteria, UA OCCASIONAL (A) NEGATIVE /HPF    Squam Epithel, UA 13 /HPF    Mucus, UA RARE (A) NEGATIVE HPF    Trichomonas, UA NONE SEEN NONE SEEN /HPF    Hyaline Casts, UA 5 /LPF   Magnesium   Result Value Ref Range    Magnesium 2.1 1.8 - 2.4 mg/dl   Troponin   Result Value Ref Range    Troponin T <0.010 <0.01 NG/ML   CK   Result Value Ref Range    Total CK 51 26 - 140 IU/L   EKG 12 Lead   Result Value Ref Range    Ventricular Rate 62 BPM    Atrial Rate 62 BPM    P-R Interval 186 ms    QRS Duration 100 ms    Q-T Interval 404 ms    QTc Calculation (Bazett) 410 ms    P Axis 54 degrees    R Axis -21 degrees    T Axis 34 degrees    Diagnosis       Normal sinus rhythm  Normal ECG  When compared with ECG of 21-AUG-2021 19:19,  QRS duration has increased  ST no longer depressed in Inferior leads  T wave inversion no longer evident in Inferior leads  T wave inversion no longer evident in Lateral leads  QT has shortened         EKG Interpretation  Please see ED physician's note for EKG interpretation - [place]      IMAGING:  CT HEAD WO CONTRAST   Final Result   No acute intracranial abnormality. XR CHEST PORTABLE   Final Result   No acute cardiopulmonary abnormality. ED COURSE & MEDICAL DECISION MAKING       Vital signs and nursing notes reviewed during ED course. I have independently evaluated this patient. Supervising physician present in the Emergency Department, available for consultation, throughout entirety of patient care. Patient presents as above signed out to me by OSMANI Bland, with all labs and imaging as well as EKG already performed and resulted. Labs without emergent findings. Lactic acid is normal.  No leukocytosis or leukopenia. No left shift. Electrolytes are normal.  No MARILYN. Troponin is negative. CK normal.  Urine does not appear acutely infected. Rapid COVID-19 test obtained and is negative. CT head reveals no acute intracranial abnormality.  Chest xray obtained today and reveals no acute cardiopulmonary process. No pneumothorax, no consolidation concerning for pneumonia, no pleural effusion. For EKG interpretation-see ED physician's note. Patient treated with multiple medications in the ED including Percocet, Phenergan, Bentyl, and Pepcid for symptoms. She reports that she still having some abdominal pain as well as feeling nauseated. Patient requesting a shot of pain medication however patient has allergy to Toradol and she was already treated with Percocet. I consulted patient's general surgeon, Dr. Anna Hernandez, and his on-call surgeon, Dr. Treasure Quesada, and I discussed the patient's case. At this time she does not recommend CT abdomen and pelvis this patient has had 2 CTs of her abdomen pelvis most recently and labs are reassuring today. She reports that patient can follow-up outpatient in their office early this week for recheck. Patient reports that she has an appointment in 4 days and I encouraged her to call the office sooner for recheck. Patient will be provided a refill of her Phenergan prescription to help with nausea and vomiting- we discussed medication. Although patient reports multiple episodes of nausea and vomiting it appears that her oral intake is keeping up as she has reassuring labs. Patient is nontoxic appearing. Vital signs stable. Patient is stable for outpatient management. All pertinent Lab data and radiographic results reviewed with patient at bedside. The patient and/or the family were informed of the results of any tests/labs/imaging, the treatment plan, and time was allotted to answer questions. Diagnosis, disposition, and treatment plan reviewed in detail with patient. Patient understands and agrees to follow-up with her surgeon for recheck soon as possible and with her PCP for recheck in 2 to 3 days. Patient understands and agrees to return to emergency department for any new or worsening symptoms - strict return precautions given.       Final Impression:  1. Abdominal pain, epigastric    2. Non-intractable vomiting with nausea, unspecified vomiting type    3. Loss of consciousness ([facility] [address]    4.  Cough        (Please note that portions of this note may have been completed with a voice recognition program. Efforts were made to edit the dictations but occasionally words are mis-transcribed.)    Chery Briseno, [address] [facility], [place]  09/13/21 [id number]

## 2021-09-16 ENCOUNTER — OFFICE VISIT (OUTPATIENT)
Dept: INTERNAL MEDICINE CLINIC | Age: 51
End: 2021-09-16
Payer: COMMERCIAL

## 2021-09-16 VITALS
WEIGHT: 268 LBS | HEIGHT: 64 IN | SYSTOLIC BLOOD PRESSURE: 126 MMHG | HEART RATE: 64 BPM | DIASTOLIC BLOOD PRESSURE: 74 MMHG | BODY MASS INDEX: 45.75 KG/M2 | TEMPERATURE: 97.2 F

## 2021-09-16 DIAGNOSIS — F32.A CHRONIC DEPRESSION: ICD-10-CM

## 2021-09-16 DIAGNOSIS — E66.01 MORBID OBESITY WITH BMI OF 45.0-49.9, ADULT (HCC): ICD-10-CM

## 2021-09-16 DIAGNOSIS — F41.9 ANXIETY: ICD-10-CM

## 2021-09-16 DIAGNOSIS — G89.4 CHRONIC PAIN SYNDROME: ICD-10-CM

## 2021-09-16 DIAGNOSIS — M85.80 OSTEOPENIA, UNSPECIFIED LOCATION: ICD-10-CM

## 2021-09-16 DIAGNOSIS — B00.2 ORAL HERPES SIMPLEX INFECTION: Primary | ICD-10-CM

## 2021-09-16 DIAGNOSIS — E03.4 HYPOTHYROIDISM DUE TO ACQUIRED ATROPHY OF THYROID: ICD-10-CM

## 2021-09-16 DIAGNOSIS — K21.9 GASTROESOPHAGEAL REFLUX DISEASE WITHOUT ESOPHAGITIS: ICD-10-CM

## 2021-09-16 DIAGNOSIS — E55.9 VITAMIN D DEFICIENCY: ICD-10-CM

## 2021-09-16 DIAGNOSIS — D50.0 IRON DEFICIENCY ANEMIA DUE TO CHRONIC BLOOD LOSS: ICD-10-CM

## 2021-09-16 DIAGNOSIS — K86.1 CHRONIC PANCREATITIS, UNSPECIFIED PANCREATITIS TYPE (HCC): ICD-10-CM

## 2021-09-16 DIAGNOSIS — G40.909 SEIZURE DISORDER (HCC): ICD-10-CM

## 2021-09-16 DIAGNOSIS — E89.40 SURGICAL MENOPAUSE: ICD-10-CM

## 2021-09-16 DIAGNOSIS — I10 ESSENTIAL HYPERTENSION: ICD-10-CM

## 2021-09-16 DIAGNOSIS — J44.9 CHRONIC OBSTRUCTIVE PULMONARY DISEASE, UNSPECIFIED COPD TYPE (HCC): ICD-10-CM

## 2021-09-16 DIAGNOSIS — Z98.84 HISTORY OF ROUX-EN-Y GASTRIC BYPASS: ICD-10-CM

## 2021-09-16 PROCEDURE — G8427 DOCREV CUR MEDS BY ELIG CLIN: HCPCS | Performed by: INTERNAL MEDICINE

## 2021-09-16 PROCEDURE — 1111F DSCHRG MED/CURRENT MED MERGE: CPT | Performed by: INTERNAL MEDICINE

## 2021-09-16 PROCEDURE — G8926 SPIRO NO PERF OR DOC: HCPCS | Performed by: INTERNAL MEDICINE

## 2021-09-16 PROCEDURE — 3023F SPIROM DOC REV: CPT | Performed by: INTERNAL MEDICINE

## 2021-09-16 PROCEDURE — 1036F TOBACCO NON-USER: CPT | Performed by: INTERNAL MEDICINE

## 2021-09-16 PROCEDURE — 99214 OFFICE O/P EST MOD 30 MIN: CPT | Performed by: INTERNAL MEDICINE

## 2021-09-16 PROCEDURE — G8417 CALC BMI ABV UP PARAM F/U: HCPCS | Performed by: INTERNAL MEDICINE

## 2021-09-16 PROCEDURE — 3017F COLORECTAL CA SCREEN DOC REV: CPT | Performed by: INTERNAL MEDICINE

## 2021-09-16 RX ORDER — CLONIDINE HYDROCHLORIDE 0.1 MG/1
0.1 TABLET ORAL 2 TIMES DAILY
Qty: 60 TABLET | Refills: 3 | Status: SHIPPED | OUTPATIENT
Start: 2021-09-16 | End: 2022-04-14

## 2021-09-16 RX ORDER — LEVOTHYROXINE SODIUM 0.12 MG/1
125 TABLET ORAL DAILY
Qty: 30 TABLET | Refills: 3 | Status: SHIPPED | OUTPATIENT
Start: 2021-09-16 | End: 2021-12-20 | Stop reason: SDUPTHER

## 2021-09-16 RX ORDER — PAROXETINE 30 MG/1
45 TABLET, FILM COATED ORAL NIGHTLY
Qty: 45 TABLET | Refills: 3 | Status: SHIPPED | OUTPATIENT
Start: 2021-09-16 | End: 2022-02-07 | Stop reason: SDUPTHER

## 2021-09-16 RX ORDER — ESTRADIOL 0.5 MG/1
0.5 TABLET ORAL DAILY
Qty: 30 TABLET | Refills: 0 | Status: SHIPPED | OUTPATIENT
Start: 2021-09-16 | End: 2021-12-20 | Stop reason: SDUPTHER

## 2021-09-16 RX ORDER — VALACYCLOVIR HYDROCHLORIDE 1 G/1
2000 TABLET, FILM COATED ORAL 2 TIMES DAILY
Qty: 4 TABLET | Refills: 0 | Status: SHIPPED | OUTPATIENT
Start: 2021-09-16 | End: 2021-09-17

## 2021-09-16 NOTE — PROGRESS NOTES
Name: Sirena Olsen  [id number]  Age: 44 y.o. YOB: 1970  Sex: female    CHIEF COMPLAINT:    Chief Complaint   Patient presents with    Medication Refill    Follow-up     Patient is having issues with possibly \"staff\" infection. Has a blister inside her mouth, a lymph node swollen on her neck, and red spots appearing on her arms. HISTORY OF PRESENT ILLNESS:     This is a pleasant  44 y.o. female  is seen today for management of chronic medical problems and medications refills. Previous records reviewed . She claims she has ulcers on the left side of her upper lip. C/O lump on the back of neck on the left side from last 2 days. She had gastric bypass surgery on 8/16/2021. Has lost @ 14 lbs since then. She has been going to ER frequently for nausea and vomiting. She is going to see Dr. Quintero Lay her bariatric surgeon tomorrow. She has generalized aches and pains for which she is seeing pain management doctor and taking Percocet from them. Denies any chest pain or shortness of breath. No fever, sore throat, cough or chest congestion. Denies any abdominal pain. Bowels are moving okay. No urinary symptoms. Has chronic low back pain. Hearing is good. Vision okay with glasses. Denies any significant skin lesions. She has a history of seizure disorder but no recent seizures. She has been fully vaccinated for COVID-19. Labs from 9/12/2021 reviewed and explained to the patient.         Past Medical History:    Patient Active Problem List   Diagnosis    Fibromyalgia    Lupus (systemic lupus erythematosus) (HCC)    Chronic pancreatitis (HCC)    HTN (hypertension)    Frequent UTI    Gastroesophageal reflux disease without esophagitis    Chronic depression    Fatty liver disease, nonalcoholic    Arthritis    Bilateral low back pain with left-sided sciatica    Hiatal hernia    Status post laparoscopic sleeve gastrectomy    Pseudoseizure    Chronic pain syndrome    Drug-seeking/Aberrant behavior    Lymph node disorder    Morbid obesity with BMI of 40.0-44.9, adult (HCC)    Iron deficiency anemia    History of Jet-en-Y gastric bypass    Seizure disorder (HCC)    Anxiety    RUQ pain    Status post bariatric surgery    Morbid obesity with BMI of 45.0-49.9, adult (HCC)    Discoloration of skin of flank resembling ecchymosis    Morbid obesity with BMI of 50.0-59.9, adult (HCC)    Chest pain of uncertain etiology    Cellulitis of left thigh    Malabsorption    COPD [facility]chronic obstructive pulmonary disease) ([facility]    Hypothyroidism due to acquired atrophy of thyroid    Osteopenia    Vitamin D deficiency    Irritable bowel syndrome    Malabsorption due to intolerance, not elsewhere classified    Port-A-Cath in place        Past Surgical History:        Procedure Laterality Date    APPENDECTOMY  02/1998    Done When Tubes And Ovaries Were Removed    CARDIAC CATHETERIZATION  10/24/2010    CATHETER REMOVAL N/A 7/16/2019    PORT REMOVAL performed by Tamara Obrien MD at [address] [facility]  08/27/1991    CHOLECYSTECTOMY, LAPAROSCOPIC  1990's    COLONOSCOPY  Last Done In 2000's    DENTAL SURGERY      Teeth Extracted In Past    ENDOSCOPY, COLON, DIAGNOSTIC  Last Done In 2018    FOOT DEBRIDEMENT Left 6/16/2019    FIRST METATARSAL DEBRIDEMENT INCISION AND DRAINAGE. EXCISION OF ULCER.  RESECTION OF BONE. 1ST METATARSAL POWER LAVAGE AND BONE CEMENT performed by Nick Houston DPM at [address] Left Last Done In 2016     Dr. Zayda Norris, \" About 6 Surgeries Done Because Of Staph Infection\"    HIATAL HERNIA REPAIR N/A 2/12/2019    HERNIA HIATAL LAPAROSCOPIC ROBOTIC performed by Tamara Obrien MD at [address]  10/1997    Partial Abdominal Hysterectomy    INSERTION / REMOVAL / REPLACEMENT VENOUS ACCESS CATHETER N/A 8/15/2019    PORT INSERTION performed by Tamara Obrien MD at [address]  ~2000    removed after 6 months    LEG BIOPSY EXCISION Left 3/8/2021    LEFT THIGH LESION BIOPSY EXCISION performed by Kendra Whittington MD at [facility], PARTIAL Left 2008    Benign    OTHER SURGICAL HISTORY  02/1998    \"Tubes And Ovaries Removed, Appendectomy Also Done\"    OTHER SURGICAL HISTORY  Last Done 7-15-16    Mediport Insertion \"Total Of Six Done, Removed Last Mediport In 2014\"    PORT SURGERY N/A 1/15/2020    PORT REMOVAL performed by Kendra Whittington MD at [address] [place] [address] N/A 7/6/2020    PORT INSERTION performed by Kendra Whittington MD at [address] [place] [address] Left 8/3/2021    MEDIPORT REPLACEMENT performed by Kendra Whittington MD at [address] [place] [address] N/A 8/16/2021    GASTRIC BYPASS RUFINO-EN-Y LAPAROSCOPIC ROBOTIC, LYSIS OF ADHESIONS performed by Kendra Whittington MD at [address] [place] [address] N/A 2/12/2019    GASTRECTOMY SLEEVE LAPAROSCOPIC ROBOTIC performed by Kendra Whittington MD at [address] [place] [address]  08/27/2018    UPPER GASTROINTESTINAL ENDOSCOPY N/A 4/2/2019    EGD CONTROL HEMORRHAGE with epi injection at bleeding site performed by Kendra Whittington MD at [place] [address] 6/19/2019    EGD DIAGNOSTIC ONLY performed by Kendra Whittington MD at [facility] N/A 7/22/2019    EGD DIAGNOSTIC ONLY performed by Farhan Hatfield MD at [place] [address] 10/14/2019    EGD DIAGNOSTIC ONLY performed by Kendra Whittington MD at [facility] N/A 7/17/2020    EGJ DILATATION BALLOON WITH 18-20 MM BALLOON, DILATED TO 20 MM. performed by Kendra Whittington MD at [place] [address] 5/28/2021    EGD BIOPSY performed by Kendra Whittington MD at [address] [place] [address] History:   Social History     Tobacco Use    Smoking status: Never Smoker    Smokeless tobacco: Never Used   Substance Use Topics    Alcohol use: No     Alcohol/week: 0.0 standard drinks       Family History:       Problem Relation Age of Onset    Diabetes Mother         \"Borderline Diabetes\"    High Blood Pressure Mother     Obesity Mother     Arthritis Mother     Heart Disease Mother     High Cholesterol Mother     Vision Loss Mother     Diabetes Father     High Blood Pressure Father     Asthma Father     Cancer Father         prostate cancer    High Blood Pressure Brother     Asthma Son     Vision Loss Son     Lupus Daughter     Other Daughter         \"Alot Of Female Problems\"       Allergies:  Aspirin, Compazine [prochlorperazine], Shellfish-derived products, Toradol [ketorolac tromethamine], Haldol [haloperidol], and Reglan [metoclopramide]    Current Medications :      Prior to Admission medications    Medication Sig Start Date End Date Taking?  Authorizing Provider   valACYclovir (VALTREX) 1 g tablet Take 2 tablets by mouth 2 times daily for 1 day 9/16/21 9/17/21 Yes Stewart Phillips MD   cloNIDine (CATAPRES) 0.1 MG tablet Take 1 tablet by mouth 2 times daily 9/16/21  Yes Stewart Phillips MD   estradiol (ESTRACE) 0.5 MG tablet Take 1 tablet by mouth daily 9/16/21  Yes Stewart Phillips MD   PARoxetine (PAXIL) 30 MG tablet Take 1.5 tablets by mouth nightly 9/16/21  Yes Stewart Phillips MD   levothyroxine (SYNTHROID) 125 MCG tablet Take 1 tablet by mouth Daily 9/16/21  Yes Stewart Phillips MD   benzocaine (ORAJEL) 10 % mucosal gel Apply to the ulcer 2-3 times a day 9/16/21  Yes Stewart Phillips MD   promethazine (PHENERGAN) 25 MG tablet Take 1 tablet by mouth every 8 hours as needed for Nausea 9/12/21   Tejal Stanley PA-C   albuterol (PROVENTIL) (2.5 MG/3ML) 0.083% nebulizer solution Take 3 mLs by nebulization every 6 hours as needed for Wheezing 9/9/21   Reji Cantrell MD   albuterol sulfate  (90 Base) MCG/ACT inhaler Inhale 2 puffs into the lungs 4 times daily 9/9/21   Teressa Ibarra MD   oxyCODONE-acetaminophen (PERCOCET) 5-325 MG per tablet TAKE 1 TABLET BY MOUTH EVERY 6 HOURS AS NEEDED FOR 28 DAYS 8/28/21   Historical Provider, MD   NARCAN 4 MG/0.1ML LIQD nasal spray USE AS DIRECTED AS NEEDED FOR SUSPECTED OPIOID OVERDOSE 8/20/21   Historical Provider, MD   hydrOXYzine (VISTARIL) 50 MG capsule Take 1 capsule by mouth every 6 hours as needed for Anxiety 9/1/21   Elaine Tucker MD   pantoprazole (PROTONIX) 40 MG tablet Take 1 tablet by mouth 2 times daily 8/18/21   Matthew Reyes MD   ondansetron (ZOFRAN ODT) 4 MG disintegrating tablet Take 1 tablet by mouth every 4 hours as needed for Nausea or Vomiting 8/18/21   Matthew Reyes MD   scopolamine (TRANSDERM-SCOP, 1.5 MG,) transdermal patch Place 1 patch onto the skin every 72 hours 8/18/21 8/18/22  Matthew Reyes MD   scopolamine (TRANSDERM-SCOP) transdermal patch APPLY 1 PATCH TRANSDERMALLY TO THE SKIN BEHIND THE EAR ONCE EVERY 3 DAYS AS NEEDED 7/4/21   Kim Kiser PA-C   ondansetron (ZOFRAN ODT) 4 MG disintegrating tablet Take 1 tablet by mouth every 8 hours as needed for Nausea 7/2/21   Williams Covarrubias DO   ondansetron (ZOFRAN ODT) 4 MG disintegrating tablet Take 1 tablet by mouth every 8 hours as needed for Nausea 6/17/21   Williams Covarrubias DO   Melatonin 10 MG TABS Take 10-20 mg by mouth nightly as needed    Historical Provider, MD   Cyanocobalamin (VITAMIN B 12 PO) Take 1 tablet by mouth daily    Historical Provider, MD   meclizine (ANTIVERT) 25 MG tablet Take 25 mg by mouth 3 times daily as needed for Dizziness    Historical Provider, MD   levETIRAcetam (KEPPRA) 1000 MG tablet Take 1 tablet by mouth 2 times daily 5/13/21   Elaine Tucker MD   atenolol (TENORMIN) 25 MG tablet Take 1 tablet by mouth daily 5/13/21   Elaine Tucker MD   betamethasone valerate (VALISONE) 0.1 % ointment APPLY OINTMENT TO AFFECTED AREA TWICE DAILY TO HANDS AND ELBOWS FOR 21 DAYS 3/26/21   Historical Provider, MD   potassium gluconate 550 mg tablet Take 1 tablet by mouth daily 3/25/21   Historical Provider, MD   tiZANidine (ZANAFLEX) 4 MG tablet Take 4 mg by mouth every 8 hours as needed  3/3/21   Historical Provider, MD   ferrous sulfate (IRON 325) 325 (65 Fe) MG tablet Take 1 tablet by mouth daily (with breakfast) 10/23/20   Nanette Whittaker MD   Cholecalciferol (VITAMIN D3) 5000 units TABS Take 1 tablet by mouth daily    Historical Provider, MD   Multiple Vitamin (MVI, BARIATRIC ADVANTAGE MULTI-FORMULA, CHEW TAB) Take 1 tablet by mouth daily 2/22/19   Nanette Whittaker MD   Calcium Citrate-Vitamin D (CALCIUM + VIT D, BARIATRIC ADVANTAGE, CHEWABLE TABLET) Take 1 tablet by mouth 2 times daily 2/22/19   Nanette Whittaker MD   gabapentin (NEURONTIN) 800 MG tablet Take 800 mg by mouth 3 times daily    Historical Provider, MD       LAB DATA: Reviewed. REVIEW OF SYSTEMS:   see HPI/ Comprehensive review of systems negative except for the ones mentioned in HPI. PHYSICAL EXAMINATION:   /74 (Site: Left Upper Arm, Position: Sitting, Cuff Size: Large Adult)   Pulse 64   Temp 97.2 °F (36.2 °C) (Infrared)   Ht 5' 4\" (1.626 m)   Wt 268 lb (121.6 kg)   BMI 46.00 kg/m²      GENERAL APPEARANCE:    Alert, oriented x 3, well developed, cooperative, not in any distress, appears stated age. HEAD:   Normocephalic, atraumatic   Mouth:                         Ulcer on the left upper lip. EYES:   PERRLA, EOMI, lids normal, conjuctivea clear, sclera anicteric. NECK:    Supple, symmetrical,  trachea midline, no thyromegaly, no JVD, no lymphadenopathy. Small nodule on the left side of the neck. LUNGS:    Clear to auscultation bilaterally, respirations unlabored, accessory muscles are not used. HEART:     Regular rate and rhythm, S1 and S2 normal, no murmur, rub or gallop. PMI in MCL. ABDOMEN:    Soft, non-tender, bowel sounds are normoactive, no masses, no hepatospleenomegaly. .  Patient is morbidly obese  EXTREMITY:   no bipedal edema  NEURO:  Alert, oriented to person, place and time. Grossly intact. Musculoskeletal:         No kyphosis or scoliosis, tenderness lower back and in multiple joints. Skin:                            Warm and dry. No rash or obvious suspicious lesions. PSYCH:  Mood euthymic, insight and judgement good. ASSESSMENT/PLAN:    1. Oral herpes simplex infection  We will treat with Valtrex 2 g twice daily x1 day  - valACYclovir (VALTREX) 1 g tablet; Take 2 tablets by mouth 2 times daily for 1 day  Dispense: 4 tablet; Refill: 0  We will also give Orajel to apply 2-3 times a day. 2. Morbid obesity with BMI of 45.0-49.9, adult ([facility]  Advised diet, exercise and weight loss. 3. History of Jet-en-Y gastric bypass  Advised to follow with her bariatric surgeon. 4. Seizure disorder ([facility]  Patient on Keppra    5. Osteopenia, unspecified location  Continue Os-[place] plus D and vitamin D 3.    6. Hypothyroidism due to acquired atrophy of thyroid  Continue Synthroid  - levothyroxine (SYNTHROID) 125 MCG tablet; Take 1 tablet by mouth Daily  Dispense: 30 tablet; Refill: 3    7. Essential hypertension  Stable, continue Tenormin and clonidine  - cloNIDine (CATAPRES) 0.1 MG tablet; Take 1 tablet by mouth 2 times daily  Dispense: 60 tablet; Refill: 3    8. Gastroesophageal reflux disease without esophagitis  Continue Protonix    9. Chronic obstructive pulmonary disease, unspecified COPD type ([facility]  Continue inhaler as needed    10. Chronic pain syndrome  Advised to follow with pain management doctor and continue to take pain medications according to their recommendations. 11. Chronic depression  Continue Paxil  - PARoxetine (PAXIL) 30 MG tablet; Take 1.5 tablets by mouth nightly  Dispense: 45 tablet; Refill: 3    12. Chronic pancreatitis, unspecified pancreatitis type ([facility]  History of chronic pancreatitis. Stable. 13. Anxiety  Patient on Vistaril. Advised to follow with psychiatrist    14.  Iron deficiency anemia due to chronic blood loss  Continue ferrous sulfate    15. Surgical menopause  Restart estradiol but advised her to see her gynecologist soon for further treatment. - estradiol (ESTRACE) 0.5 MG tablet; Take 1 tablet by mouth daily  Dispense: 30 tablet; Refill: 0    16. Vitamin D deficiency  Continue vitamin D3. Advised to continue to follow COVID-19 precautions. Care discussed with patient. Questions answered and patient verbalizes understanding and agrees with plan. Medications reviewed and reconciled. Continue current medications. Appropriate prescriptions are ordered. Risks and benefits of meds are discussed. After visit summary provided. Advised to call for any problems, questions, or concerns. If symptoms worsen or don't improve as expected, to call us or go to ER. Follow up as directed, sooner if needed. Return in about 3 months (around 12/16/2021). This dictation was performed with a verbal recognition program and it was checked for errors. It is possible that there are still dictated errors within this office note. Any errors should be brought immediately to my attention for correction. All efforts were made to ensure that this office note is accurate.      Sagrario Schmidt MD MD

## 2021-09-17 ENCOUNTER — HOSPITAL ENCOUNTER (EMERGENCY)
Age: 51
Discharge: HOME | End: 2021-09-17
Payer: COMMERCIAL

## 2021-09-17 VITALS
HEART RATE: 64 BPM | RESPIRATION RATE: 18 BRPM | SYSTOLIC BLOOD PRESSURE: 118 MMHG | WEIGHT: 260 LBS | BODY MASS INDEX: 44.39 KG/M2 | DIASTOLIC BLOOD PRESSURE: 72 MMHG | OXYGEN SATURATION: 97 % | TEMPERATURE: 98.2 F | HEIGHT: 64 IN

## 2021-09-17 DIAGNOSIS — R10.9 CHRONIC ABDOMINAL PAIN: ICD-10-CM

## 2021-09-17 DIAGNOSIS — B00.9 HERPES SIMPLEX: ICD-10-CM

## 2021-09-17 DIAGNOSIS — R21 RASH AND OTHER NONSPECIFIC SKIN ERUPTION: Primary | ICD-10-CM

## 2021-09-17 DIAGNOSIS — G89.29 CHRONIC ABDOMINAL PAIN: ICD-10-CM

## 2021-09-17 PROCEDURE — 85025 COMPLETE CBC W/AUTO DIFF WBC: CPT

## 2021-09-17 PROCEDURE — 6360000002 HC RX W HCPCS: Performed by: PHYSICIAN ASSISTANT

## 2021-09-17 PROCEDURE — 99283 EMERGENCY DEPT VISIT LOW MDM: CPT

## 2021-09-17 PROCEDURE — 96372 THER/PROPH/DIAG INJ SC/IM: CPT

## 2021-09-17 RX ORDER — DIPHENHYDRAMINE HYDROCHLORIDE 50 MG/ML
25 INJECTION INTRAMUSCULAR; INTRAVENOUS ONCE
Status: COMPLETED | OUTPATIENT
Start: 2021-09-17 | End: 2021-09-17

## 2021-09-17 RX ORDER — DEXAMETHASONE SODIUM PHOSPHATE 4 MG/ML
4 INJECTION, SOLUTION INTRA-ARTICULAR; INTRALESIONAL; INTRAMUSCULAR; INTRAVENOUS; SOFT TISSUE ONCE
Status: COMPLETED | OUTPATIENT
Start: 2021-09-17 | End: 2021-09-17

## 2021-09-17 RX ADMIN — DEXAMETHASONE SODIUM PHOSPHATE 4 MG: 4 INJECTION, SOLUTION INTRAMUSCULAR; INTRAVENOUS at 06:57

## 2021-09-17 RX ADMIN — DIPHENHYDRAMINE HYDROCHLORIDE 25 MG: 50 INJECTION, SOLUTION INTRAMUSCULAR; INTRAVENOUS at 06:46

## 2021-09-17 ASSESSMENT — PAIN SCALES - GENERAL: PAINLEVEL_OUTOF10: 7

## 2021-09-17 ASSESSMENT — PAIN DESCRIPTION - PAIN TYPE: TYPE: ACUTE PAIN

## 2021-09-17 ASSESSMENT — PAIN DESCRIPTION - LOCATION: LOCATION: ABDOMEN

## 2021-09-17 NOTE — ED PROVIDER NOTES
As physician-in-triage, I performed a medical screening history and physical exam on this patient. HISTORY OF PRESENT ILLNESS  Andre Carias is a 44 y.o. female with acute on chronic right-sided abdominal pain status post gastric bypass surgery about 3 weeks ago. States that it feels like her prior episodes of pancreatitis. Also complaining of headache and swollen lymph nodes in the back of her head. PHYSICAL EXAM  /72   Pulse 64   Temp 98.2 °F (36.8 °C) (Oral)   Resp 18   Ht 5' 4\" (1.626 m)   Wt 260 lb (117.9 kg)   SpO2 97%   BMI 44.63 kg/m²     On exam, the patient appears in no acute distress. Speech is clear. Breathing is unlabored. Moves all extremities.          Carrie Hinkle MD  09/17/21 1912

## 2021-09-23 DIAGNOSIS — I10 ESSENTIAL HYPERTENSION: ICD-10-CM

## 2021-09-23 DIAGNOSIS — F41.9 ANXIETY: ICD-10-CM

## 2021-09-23 RX ORDER — HYDROXYZINE PAMOATE 50 MG/1
50 CAPSULE ORAL EVERY 6 HOURS PRN
Qty: 48 CAPSULE | Refills: 0 | Status: SHIPPED | OUTPATIENT
Start: 2021-09-23 | End: 2021-10-18 | Stop reason: SDUPTHER

## 2021-09-23 RX ORDER — ATENOLOL 25 MG/1
TABLET ORAL
Qty: 30 TABLET | Refills: 0 | Status: SHIPPED | OUTPATIENT
Start: 2021-09-23 | End: 2021-12-20 | Stop reason: SDUPTHER

## 2021-09-24 ENCOUNTER — HOSPITAL ENCOUNTER (EMERGENCY)
Age: 51
Discharge: HOME | End: 2021-09-24
Attending: EMERGENCY MEDICINE
Payer: COMMERCIAL

## 2021-09-24 DIAGNOSIS — R11.2 NON-INTRACTABLE VOMITING WITH NAUSEA, UNSPECIFIED VOMITING TYPE: Primary | ICD-10-CM

## 2021-09-24 PROCEDURE — 96372 THER/PROPH/DIAG INJ SC/IM: CPT

## 2021-09-24 PROCEDURE — 99283 EMERGENCY DEPT VISIT LOW MDM: CPT

## 2021-09-24 PROCEDURE — 6360000002 HC RX W HCPCS: Performed by: EMERGENCY MEDICINE

## 2021-09-24 RX ORDER — PROMETHAZINE HYDROCHLORIDE 25 MG/ML
25 INJECTION, SOLUTION INTRAMUSCULAR; INTRAVENOUS ONCE
Status: COMPLETED | OUTPATIENT
Start: 2021-09-24 | End: 2021-09-24

## 2021-09-24 RX ORDER — DICYCLOMINE HYDROCHLORIDE 10 MG/ML
20 INJECTION INTRAMUSCULAR ONCE
Status: COMPLETED | OUTPATIENT
Start: 2021-09-24 | End: 2021-09-24

## 2021-09-24 RX ORDER — PROMETHAZINE HYDROCHLORIDE 25 MG/1
25 TABLET ORAL EVERY 8 HOURS PRN
Qty: 15 TABLET | Refills: 0 | OUTPATIENT
Start: 2021-09-24

## 2021-09-24 RX ADMIN — DICYCLOMINE HYDROCHLORIDE 20 MG: 20 INJECTION, SOLUTION INTRAMUSCULAR at 05:06

## 2021-09-24 RX ADMIN — PROMETHAZINE HYDROCHLORIDE 25 MG: 25 INJECTION INTRAMUSCULAR; INTRAVENOUS at 05:05

## 2021-09-24 NOTE — ED PROVIDER NOTES
Triage Chief Complaint:   No chief complaint on file. [place]:  Patti Mack is a 44 y.o. female that presents with main complaint of nausea vomiting. Patient is status post laparoscopic robotic assisted Jet-en-Y gastric bypass with antecolic antegastric gastrojejunal anastomosis on August 16 and since then has had fairly intractable upper abdominal pain in the epigastric and right upper quadrant regions which the patient states is still present but she has had worsening nausea and vomiting over night and cannot keep any food or fluids down despite the use of Phenergan and Zofran at home. States that she feels lightheaded with standing. States that her joints ache and is worried about low potassium. States that she has had normal bowel movements and is passing flatus. Denies any fevers, chest pain, shortness of breath. ROS:  At least 10 systems reviewed and otherwise acutely negative except as in the [address] Past Medical History:   Diagnosis Date    Acid reflux     Anemia     Anxiety     Arthritis     Hands, Back And Ankles    Bleeding ulcer 2014    \"I Had Ulcers In My Stomach And Colon\"/ per pt on 8/12/2019\"they said recently having some blood in my stomach- in July ( 2019)could not find where coming from \"    Bronchitis Last Episode 2014    Chronic back pain     Chronic pain     Sees Dr. Ty Powell COPD (chronic obstructive pulmonary disease) ([facility]     Sees Dr. Beth Garcia Depression     Fibromyalgia Dx 2013    GERD (gastroesophageal reflux disease)     H/O echocardiogram 08/11/2015    EF >55%. LA to be at the upper limit of normal in size. LV hypertrophy with normal LV systolic, but abnormal diastolic function. Normal valvular structures and function.      H/O echocardiogram 08/30/2018    EF 55-60%    Hiatal hernia     History of blood transfusion 09/2015 And 2018    No Reaction To Blood Transfusions Received    History of sleeve gastrectomy     [place] (hard of hearing)     Right Ear    Hx of cardiac catheterization 10/24/2010    Angiographically normal coronary arteries w/ normal LV function and wall motion.  Hx of transesophageal echocardiography (PILO) for monitoring 12/02/2010    EF 55-60%. WNL.    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 name   Ashely Grad cysts     \"they found that I have a kidney cyst but not sure which side\" per pt on 7/15/2020    Lupus ([address] Dx 2013    \"Rheumatoid Lupus\"    MDRO (multiple drug resistant organisms) resistance     4 months ago chest from mediport    Morbid obesity ([address]     MRSA bacteremia     Nausea     \"Most Of The Time\"    Osteomyelitis of fifth toe of left foot ([address]     Pain management     Sees Dr. Augustus Marquis, Once A Month    Pancreatitis chronic Dx 2001    Pneumonia Dx 11-15    Seizures ([address]     Shortness of breath on exertion     Shortness of breath on exertion     Sleep apnea     Has CPAP\"no longer use the cpap since lost weight\"    Staph infection Dx 11-15    Left Foot    Staph infection Dx 11-15    \"Left Foot\"    Teeth missing     Upper And Lower    Thyroid disease     Wears glasses     To Read     Past Surgical History:   Procedure Laterality Date    APPENDECTOMY  02/1998    Done When Tubes And Ovaries Were Removed    CARDIAC CATHETERIZATION  10/24/2010    CATHETER REMOVAL N/A 7/16/2019    PORT REMOVAL performed by Santos Briseno MD at [address] [facility]  08/27/1991    CHOLECYSTECTOMY, LAPAROSCOPIC  1990's    COLONOSCOPY  Last Done In 2000's    DENTAL SURGERY      Teeth Extracted In Past    ENDOSCOPY, COLON, DIAGNOSTIC  Last Done In 2018    FOOT DEBRIDEMENT Left 6/16/2019    FIRST METATARSAL DEBRIDEMENT INCISION AND DRAINAGE. EXCISION OF ULCER.  RESECTION OF BONE. 1ST METATARSAL POWER LAVAGE AND BONE CEMENT performed by Kia Chinchilla DPM at [address] Left Last Done In 2016     Dr. Rima Sofia, \" About 6 Surgeries Done Because Of Staph Infection\"    HIATAL HERNIA REPAIR N/A 2/12/2019    HERNIA HIATAL LAPAROSCOPIC ROBOTIC performed by Dola Galeazzi, MD at [facility]  10/1997    Partial Abdominal Hysterectomy    INSERTION / REMOVAL / REPLACEMENT VENOUS ACCESS CATHETER N/A 8/15/2019    PORT INSERTION performed by Dola Galeazzi, MD at [address] [facility]    removed after 6 months    LEG BIOPSY EXCISION Left 3/8/2021    LEFT THIGH LESION BIOPSY EXCISION performed by Dola Galeazzi, MD at [place] [address], PARTIAL Left 2008    Benign    OTHER SURGICAL HISTORY  02/1998    \"Tubes And Ovaries Removed, Appendectomy Also Done\"    OTHER SURGICAL HISTORY  Last Done 7-15-16    Mediport Insertion \"Total Of Six Done, Removed Last Mediport In 2014\"    PORT SURGERY N/A 1/15/2020    PORT REMOVAL performed by Dola Galeazzi, MD at [address] N/A 7/6/2020    PORT INSERTION performed by Dola Galeazzi, MD at [address] Left 8/3/2021    MEDIPORT REPLACEMENT performed by Dola Galeazzi, MD at [address] [place] [address] N/A 8/16/2021    GASTRIC BYPASS RUFINO-EN-Y LAPAROSCOPIC ROBOTIC, LYSIS OF ADHESIONS performed by Dola Galeazzi, MD at [facility] N/A 2/12/2019    GASTRECTOMY SLEEVE LAPAROSCOPIC ROBOTIC performed by Dola Galeazzi, MD at [facility]  08/27/2018    UPPER GASTROINTESTINAL ENDOSCOPY N/A 4/2/2019    EGD CONTROL HEMORRHAGE with epi injection at bleeding site performed by Dola Galeazzi, MD at [address] [facility] 6/19/2019    EGD DIAGNOSTIC ONLY performed by Dola Galeazzi, MD at [facility] ENDOSCOPY    UPPER GASTROINTESTINAL ENDOSCOPY N/A 7/22/2019    EGD DIAGNOSTIC ONLY performed by Gatito Hines MD at [address] [place] [address] N/A 10/14/2019    EGD DIAGNOSTIC ONLY performed by Edgardo Hayes MD at [address] [place] [address] N/A 7/17/2020    EGJ DILATATION BALLOON WITH 18-20 MM BALLOON, DILATED TO 20 MM. performed by Edgardo Hayes MD at [address] [place] [address] N/A 5/28/2021    EGD BIOPSY performed by Edgardo Hayes MD at [place] [address] History   Problem Relation Age of Onset    Diabetes Mother         \"Borderline Diabetes\"    High Blood Pressure Mother     Obesity Mother     Arthritis Mother     Heart Disease Mother     High Cholesterol Mother     Vision Loss Mother     Diabetes Father     High Blood Pressure Father     Asthma Father     Cancer Father         prostate cancer    High Blood Pressure Brother     Asthma Son     Vision Loss Son     Lupus Daughter     Other Daughter         \"Alot Of Female Problems\"     Social History     Socioeconomic History    Marital status:      Spouse name: Not on file    Number of children: Not on file    Years of education: Not on file    Highest education level: Not on file   Occupational History    Not on file   Tobacco Use    Smoking status: Never Smoker    Smokeless tobacco: Never Used   Vaping Use    Vaping Use: Never used   Substance and Sexual Activity    Alcohol use: No     Alcohol/week: 0.0 standard drinks    Drug use: No    Sexual activity: Not Currently   Other Topics Concern    Not on file   Social History Narrative    Not on file     Social Determinants of Health     Financial Resource Strain:     Difficulty of Paying Living Expenses:    Food Insecurity:     Worried About Running Out of Food in the Last Year:     Ran Out of Food in the Last Year:    Transportation Needs:     Lack of Transportation (Medical):  Lack of Transportation (Non-Medical):    Physical Activity:     Days of Exercise per Week:     Minutes of Exercise per Session:    Stress:     Feeling of Stress :    Social Connections:     Frequency of Communication with Friends and Family:     Frequency of Social Gatherings with Friends and Family:     Attends [employer] Services:     Active Member of Clubs or Organizations:     Attends Club or Organization Meetings:     Marital Status:    Intimate Partner Violence:     Fear of Current or Ex-Partner:     Emotionally Abused:     Physically Abused:     Sexually Abused:      No current facility-administered medications for this encounter.      Current Outpatient Medications   Medication Sig Dispense Refill    atenolol (TENORMIN) 25 MG tablet Take 1 tablet by mouth once daily 30 tablet 0    hydrOXYzine (VISTARIL) 50 MG capsule TAKE 1 CAPSULE BY MOUTH EVERY 6 HOURS AS NEEDED FOR ANXIETY 48 capsule 0    cloNIDine (CATAPRES) 0.1 MG tablet Take 1 tablet by mouth 2 times daily 60 tablet 3    estradiol (ESTRACE) 0.5 MG tablet Take 1 tablet by mouth daily 30 tablet 0    PARoxetine (PAXIL) 30 MG tablet Take 1.5 tablets by mouth nightly 45 tablet 3    levothyroxine (SYNTHROID) 125 MCG tablet Take 1 tablet by mouth Daily 30 tablet 3    benzocaine (ORAJEL) 10 % mucosal gel Apply to the ulcer 2-3 times a day 1 each 0    promethazine (PHENERGAN) 25 MG tablet Take 1 tablet by mouth every 8 hours as needed for Nausea 15 tablet 0    albuterol (PROVENTIL) (2.5 MG/3ML) 0.083% nebulizer solution Take 3 mLs by nebulization every 6 hours as needed for Wheezing 120 each 0    albuterol sulfate  (90 Base) MCG/ACT inhaler Inhale 2 puffs into the lungs 4 times daily 1 each 0    oxyCODONE-acetaminophen (PERCOCET) 5-325 MG per tablet TAKE 1 TABLET BY MOUTH EVERY 6 HOURS AS NEEDED FOR 28 DAYS      NARCAN 4 MG/0.1ML LIQD nasal spray USE AS DIRECTED AS NEEDED FOR SUSPECTED OPIOID OVERDOSE      pantoprazole Compazine [Prochlorperazine] Rash    Shellfish-Derived Products Swelling    Toradol [Ketorolac Tromethamine] Rash    Haldol [Haloperidol]      Shaking      Reglan [Metoclopramide] Itching       Nursing Notes Reviewed    Physical Exam:  ED Triage Vitals   Enc Vitals Group      BP       Pulse       Resp       Temp       Temp src       SpO2       Weight       Height       Head Circumference       Peak Flow       Pain Score       Pain Loc       Pain Edu? Excl. in [address]? GENERAL APPEARANCE: Awake and alert. Cooperative. No acute distress. HEAD: Normocephalic. Atraumatic. EYES: EOM's grossly intact. Sclera anicteric. ENT: Mucous membranes are moist. Tolerates saliva. No trismus. NECK: Supple. Trachea midline. HEART: RRR. Radial pulses 2+. LUNGS: Respirations unlabored. CTAB  ABDOMEN: Soft. Non-tender. No guarding or rebound. EXTREMITIES: No acute deformities. SKIN: Warm and dry. NEUROLOGICAL: No gross facial drooping. Moves all 4 extremities spontaneously. PSYCHIATRIC: Normal mood. I have reviewed and interpreted all of the currently available lab results from this visit (if applicable):  No results found for this visit on 09/24/21. Radiographs (if obtained):  [] The following radiograph was interpreted by myself in the absence of a radiologist:  [] Radiologist's Report Reviewed:    EKG (if obtained): (All EKG's are interpreted by myself in the absence of a cardiologist)    MDM:  Plan of care is discussed thoroughly with the patient and family if present. If performed, all imaging and lab work also discussed with patient. All relevant prior results and chart reviewed if available. Patient presents as above. She is in no acute distress and has normal vital signs. She has a benign abdominal exam.  She has had multiple CTs since her surgery last month for similar symptoms that are been unremarkable.   Low suspicion for any acute intra-abdominal process that requires advanced imaging at this time. She has been having normal bowel movements. Patient is given Phenergan and Bentyl for symptoms. Plan to evaluate for any significant metabolic disturbances. Prior to lab work being drawn, the patient eloped from the emergency department. Clinical Impression:  1.  Non-intractable vomiting with nausea, unspecified vomiting type      (Please note that portions of this note may have been completed with a voice recognition program. Efforts were made to edit the dictations but occasionally words are mis-transcribed.)    MD Ford Og MD  09/24/21 [id number]

## 2021-09-24 NOTE — TELEPHONE ENCOUNTER
Dr Lacey Hall pt asking for refill on phenergan sent to [facility] on [address]

## 2021-09-24 NOTE — ED NOTES
Attempted to collect labs via straight stick - pt unhappy with being stuck and asked for mediport to be accessed. Per Dr. Faiza Mullins, no IV access was needed for pt. Discussed this with pt when attempting to collect labs. Pt still asking for mediport to be accessed. When this RN went to talk to Dr. Faiza Mullins about accessing her mediport, she eloped and was not in the room on my return. Dr. Faiza Mullins notified.       Leona Cao RN  09/24/21 [id number]

## 2021-09-30 RX ORDER — PROMETHAZINE HYDROCHLORIDE 25 MG/1
25 TABLET ORAL EVERY 8 HOURS PRN
Qty: 60 TABLET | Refills: 0 | Status: SHIPPED | OUTPATIENT
Start: 2021-09-30 | End: 2021-10-18 | Stop reason: SDUPTHER

## 2021-10-07 ENCOUNTER — TELEPHONE (OUTPATIENT)
Dept: BARIATRICS/WEIGHT MGMT | Age: 51
End: 2021-10-07

## 2021-10-18 DIAGNOSIS — F41.9 ANXIETY: ICD-10-CM

## 2021-10-18 RX ORDER — PROMETHAZINE HYDROCHLORIDE 25 MG/1
25 TABLET ORAL EVERY 8 HOURS PRN
Qty: 60 TABLET | Refills: 0 | Status: SHIPPED | OUTPATIENT
Start: 2021-10-18 | End: 2021-11-07

## 2021-10-18 RX ORDER — HYDROXYZINE PAMOATE 50 MG/1
50 CAPSULE ORAL EVERY 6 HOURS PRN
Qty: 20 CAPSULE | Refills: 0 | Status: SHIPPED | OUTPATIENT
Start: 2021-10-18 | End: 2021-11-12 | Stop reason: SDUPTHER

## 2021-10-18 NOTE — TELEPHONE ENCOUNTER
Also wants to know if you could call her in some Phenergan as Dr Ani Thornton used to send it in for her?

## 2021-11-12 DIAGNOSIS — F41.9 ANXIETY: ICD-10-CM

## 2021-11-15 RX ORDER — HYDROXYZINE PAMOATE 50 MG/1
50 CAPSULE ORAL 2 TIMES DAILY
Qty: 30 CAPSULE | Refills: 0 | Status: SHIPPED | OUTPATIENT
Start: 2021-11-15 | End: 2021-12-20 | Stop reason: SDUPTHER

## 2021-12-02 ENCOUNTER — OFFICE VISIT (OUTPATIENT)
Dept: FAMILY MEDICINE CLINIC | Age: 51
End: 2021-12-02
Payer: COMMERCIAL

## 2021-12-02 ENCOUNTER — HOSPITAL ENCOUNTER (OUTPATIENT)
Age: 51
Setting detail: SPECIMEN
Discharge: HOME | End: 2021-12-02
Payer: COMMERCIAL

## 2021-12-02 VITALS
OXYGEN SATURATION: 98 % | HEART RATE: 63 BPM | TEMPERATURE: 97.3 F | BODY MASS INDEX: 42.91 KG/M2 | RESPIRATION RATE: 12 BRPM | WEIGHT: 250 LBS

## 2021-12-02 DIAGNOSIS — R11.0 NAUSEA IN ADULT: ICD-10-CM

## 2021-12-02 DIAGNOSIS — J34.89 SINUS PRESSURE: Primary | ICD-10-CM

## 2021-12-02 DIAGNOSIS — R51.9 SINUS HEADACHE: ICD-10-CM

## 2021-12-02 DIAGNOSIS — R09.82 POST-NASAL DRIP: ICD-10-CM

## 2021-12-02 PROCEDURE — 3017F COLORECTAL CA SCREEN DOC REV: CPT | Performed by: PHYSICIAN ASSISTANT

## 2021-12-02 PROCEDURE — U0003 INFECTIOUS AGENT DETECTION BY NUCLEIC ACID (DNA OR RNA); SEVERE ACUTE RESPIRATORY SYNDROME CORONAVIRUS 2 (SARS-COV-2) (CORONAVIRUS DISEASE [COVID-19]), AMPLIFIED PROBE TECHNIQUE, MAKING USE OF HIGH THROUGHPUT TECHNOLOGIES AS DESCRIBED BY CMS-2020-01-R: HCPCS

## 2021-12-02 PROCEDURE — G8484 FLU IMMUNIZE NO ADMIN: HCPCS | Performed by: PHYSICIAN ASSISTANT

## 2021-12-02 PROCEDURE — 99213 OFFICE O/P EST LOW 20 MIN: CPT | Performed by: PHYSICIAN ASSISTANT

## 2021-12-02 PROCEDURE — G8427 DOCREV CUR MEDS BY ELIG CLIN: HCPCS | Performed by: PHYSICIAN ASSISTANT

## 2021-12-02 PROCEDURE — G8417 CALC BMI ABV UP PARAM F/U: HCPCS | Performed by: PHYSICIAN ASSISTANT

## 2021-12-02 PROCEDURE — 1036F TOBACCO NON-USER: CPT | Performed by: PHYSICIAN ASSISTANT

## 2021-12-02 PROCEDURE — U0005 INFEC AGEN DETEC AMPLI PROBE: HCPCS

## 2021-12-02 RX ORDER — METHYLPREDNISOLONE 4 MG/1
TABLET ORAL
Qty: 1 KIT | Refills: 0 | Status: SHIPPED | OUTPATIENT
Start: 2021-12-02 | End: 2022-02-07

## 2021-12-02 RX ORDER — PROMETHAZINE HYDROCHLORIDE 25 MG/1
25 TABLET ORAL EVERY 8 HOURS PRN
Qty: 15 TABLET | Refills: 0 | Status: SHIPPED | OUTPATIENT
Start: 2021-12-02 | End: 2021-12-20 | Stop reason: SDUPTHER

## 2021-12-02 NOTE — PATIENT INSTRUCTIONS
Your COVID 19 test can take 1-5 days for the results to come back. We ask that you make a Mychart page and view your test results this way. You will need to Self quarantine until you know your results. Increase fluids and rest  Saline nasal spray as needed for nasal congestion  Warm salt gargles as needed for throat discomfort  Big deep breaths periodically throughout the day  Medrol Dose José Miguel as directed  Phenergan as needed  If symptoms worsen -Go to the ER. Follow up with your primary care provider      To Whom it May Concern:    Song Reed was tested for COVID-19 12/2/2021. He/she must stay home until test results are back. If test is positive, he/she must quarantine for a total of 10 days starting from day one of symptom onset. He/she must also be fever-free for 24 hours at that time, and also have improvement in symptoms. We do not recommend retesting as patients may continue to test positive for months even though no longer contagious. It is suggested you call [address] or [facility] with any questions regarding quarantine timeframe/return to work/school details. Patient Education        Viral Respiratory Infection: Care Instructions  Your Care Instructions     Viruses are very small organisms. They grow in number after they enter your body. There are many types that cause different illnesses, such as colds and the mumps. The symptoms of a viral respiratory infection often start quickly. They include a fever, sore throat, and runny nose. You may also just not feel well. Or you may not want to eat much. Most viral respiratory infections are not serious. They usually get better with time and self-care. Antibiotics are not used to treat a viral infection. That's because antibiotics will not help cure a viral illness. In some cases, antiviral medicine can help your body fight a serious viral infection. Follow-up care is a key part of your treatment and safety.  Be sure to make and go to all appointments, and call your doctor if you are having problems. It's also a good idea to know your test results and keep a list of the medicines you take. How can you care for yourself at home? · Rest as much as possible until you feel better. · Be safe with medicines. Take your medicine exactly as prescribed. Call your doctor if you think you are having a problem with your medicine. You will get more details on the specific medicine your doctor prescribes. · Take an over-the-counter pain medicine, such as acetaminophen (Tylenol), ibuprofen (Advil, Motrin), or naproxen (Aleve), as needed for pain and fever. Read and follow all instructions on the label. Do not give aspirin to anyone younger than 20. It has been linked to Reye syndrome, a serious illness. · Drink plenty of fluids. Hot fluids, such as tea or soup, may help relieve congestion in your nose and throat. If you have kidney, heart, or liver disease and have to limit fluids, talk with your doctor before you increase the amount of fluids you drink. · Try to clear mucus from your lungs by breathing deeply and coughing. · Gargle with warm salt water once an hour. This can help reduce swelling and throat pain. Use 1 teaspoon of salt mixed in 1 cup of warm water. · Do not smoke or allow others to smoke around you. If you need help quitting, talk to your doctor about stop-smoking programs and medicines. These can increase your chances of quitting for good. To avoid spreading the virus  · Cough or sneeze into a tissue. Then throw the tissue away. · If you don't have a tissue, use your hand to cover your cough or sneeze. Then clean your hand. You can also cough into your sleeve. · Wash your hands often. Use soap and warm water. Wash for 15 to 20 seconds each time. · If you don't have soap and water near you, you can clean your hands with alcohol wipes or gel. When should you call for help?    Call your doctor now or seek immediate medical care if:    · You have a new or higher fever.     · Your fever lasts more than 48 hours.     · You have trouble breathing.     · You have a fever with a stiff neck or a severe headache.     · You are sensitive to light.     · You feel very sleepy or confused. Watch closely for changes in your health, and be sure to contact your doctor if:    · You do not get better as expected. Where can you learn more? Go to https://[employer]. org and sign in to your EDITD account. Enter T733 in the [employer] box to learn more about \"Viral Respiratory Infection: Care Instructions. \"     If you do not have an account, please click on the \"Sign Up Now\" link. Current as of: July 6, 2021               Content Version: 13.0  © 7507-3008 Healthwise, Incorporated. Care instructions adapted under license by [facility] ([facility]). If you have questions about a medical condition or this instruction, always ask your healthcare professional. [place] [address] any warranty or liability for your use of this information.

## 2021-12-02 NOTE — PROGRESS NOTES
12/2/21  Andre Pope  1970    FLU/COVID-19 CLINIC EVALUATION    HPI SYMPTOMS:    Employer: Unemployed    [] Fevers  [] Chills  [] Cough  [] Coughing up blood  [] Chest Congestion  [] Nasal Congestion  [] Feeling short of breath  [] Sometimes  [] Frequently  [] All the time  [] Chest pain  [x] Headaches  [x]Tolerable  [] Severe  [] Sore throat  [] Muscle aches  [] Nausea  [] Vomiting  []Unable to keep fluids down  [] Diarrhea  []Severe    [] OTHER SYMPTOMS:      Symptom Duration:   [] 1  [] 2   [] 3   [x] 4    [] 5   [] 6   [] 7   [] 8   [] 9   [] 10   [] 11   [] 12   [] 13   [] 14   [] Longer than 14 days    Symptom course:   [x] Worsening     [] Stable     [] Improving    RISK FACTORS:    [] Pregnant or possibly pregnant  [] Age over 61  [] Diabetes  [] Heart disease  [] Asthma  [] COPD/Other chronic lung diseases  [] Active Cancer  [] On Chemotherapy  [] Taking oral steroids  [] History Lymphoma/Leukemia  [] Close contact with a lab confirmed COVID-19 patient within 14 days of symptom onset  [] History of travel from affected geographical areas within 14 days of symptom onset       VITALS:  There were no vitals filed for this visit. TESTS:    POCT FLU:  [] Positive     []Negative    ASSESSMENT:    [] Flu  [] Possible COVID-19  [] Strep    PLAN:    [] Discharge home with written instructions for:  [] Flu management  [] Possible COVID-19 infection with self-quarantine and management of symptoms  [] Follow-up with primary care physician or emergency department if worsens  [] Evaluation per physician/NP/PA in clinic  [] Sent to ER       An  electronic signature was used to authenticate this note.      --Kiera Ricketts LPN on 74/9/4516 at 8:47 PM

## 2021-12-02 NOTE — PROGRESS NOTES
12/2/2021    Andre Pope    Chief Complaint   Patient presents with    Sinusitis       HPI  History was obtained from patient. Mandy Lee is a 46 y.o. female who presents today with complaints of 4-day history of postnasal drip, frontal headache and \"sinus pressure. \"  She is also had intermittent nausea but states this is fairly constant. She contributes it secondary to weight loss surgery. She denies chest pain, shortness of breath, wheezing, cough or fevers. She denies abdominal pain, vomiting, diarrhea, loss of taste or smell. She is vaccinated against COVID-19 with 2 doses of Pfizer. She plans to go to [place] tomorrow to see her family. PAST MEDICAL HISTORY  Past Medical History:   Diagnosis Date    Acid reflux     Anemia     Anxiety     Arthritis     Hands, Back And Ankles    Bleeding ulcer 2014    \"I Had Ulcers In My Stomach And Colon\"/ per pt on 8/12/2019\"they said recently having some blood in my stomach- in July ( 2019)could not find where coming from \"    Bronchitis Last Episode 2014    Chronic back pain     Chronic pain     Sees Dr. Ivette Fernandez COPD (chronic obstructive pulmonary disease) ([facility]     Sees Dr. Luma Mccullough Depression     Fibromyalgia Dx 2013    GERD (gastroesophageal reflux disease)     H/O echocardiogram 08/11/2015    EF >55%. LA to be at the upper limit of normal in size. LV hypertrophy with normal LV systolic, but abnormal diastolic function. Normal valvular structures and function.  H/O echocardiogram 08/30/2018    EF 55-60%    Hiatal hernia     History of blood transfusion 09/2015 And 2018    No Reaction To Blood Transfusions Received    History of sleeve gastrectomy     [place] (hard of hearing)     Right Ear    Hx of cardiac catheterization 10/24/2010    Angiographically normal coronary arteries w/ normal LV function and wall motion.  Hx of transesophageal echocardiography (PILO) for monitoring 12/02/2010    EF 55-60%. WNL.   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 name    Kidney cysts     \"they found that I have a kidney cyst but not sure which side\" per pt on 7/15/2020    Lupus ([address] Dx 2013    \"Rheumatoid Lupus\"    MDRO (multiple drug resistant organisms) resistance     4 months ago chest from mediport    Morbid obesity ([address]     MRSA bacteremia     Nausea     \"Most Of The Time\"    Osteomyelitis of fifth toe of left foot ([address]     Pain management     Sees Dr. Alida Townsend, Once A Month    Pancreatitis chronic Dx 2001    Pneumonia Dx 11-15    Seizures ([address]     Shortness of breath on exertion     Shortness of breath on exertion     Sleep apnea     Has CPAP\"no longer use the cpap since lost weight\"    Staph infection Dx 11-15    Left Foot    Staph infection Dx 11-15    \"Left Foot\"    Teeth missing     Upper And Lower    Thyroid disease     Wears glasses     To Read       FAMILY HISTORY  Family History   Problem Relation Age of Onset    Diabetes Mother         \"Borderline Diabetes\"    High Blood Pressure Mother     Obesity Mother     Arthritis Mother     Heart Disease Mother     High Cholesterol Mother     Vision Loss Mother     Diabetes Father     High Blood Pressure Father     Asthma Father     Cancer Father         prostate cancer    High Blood Pressure Brother     Asthma Son     Vision Loss Son     Lupus Daughter     Other Daughter         \"Alot Of Female Problems\"       SOCIAL HISTORY  Social History     Socioeconomic History    Marital status:      Spouse name: None    Number of children: None    Years of education: None    Highest education level: None   Occupational History    None   Tobacco Use    Smoking status: Never Smoker    Smokeless tobacco: Never Used   Vaping Use    Vaping Use: Never used   Substance and Sexual Activity    Alcohol use: No     Alcohol/week: 0.0 standard drinks    Drug use: No    Sexual activity: Not Currently   Other Topics Concern    None   Social History Narrative    None     Social Determinants of Health     Financial Resource Strain:     Difficulty of Paying Living Expenses: Not on file   Food Insecurity:     Worried About Running Out of Food in the Last Year: Not on file    Tyrese of Food in the Last Year: Not on file   Transportation Needs:     Lack of Transportation (Medical): Not on file    Lack of Transportation (Non-Medical):  Not on file   Physical Activity:     Days of Exercise per Week: Not on file    Minutes of Exercise per Session: Not on file   Stress:     Feeling of Stress : Not on file   Social Connections:     Frequency of Communication with Friends and Family: Not on file    Frequency of Social Gatherings with Friends and Family: Not on file    Attends Evangelical Services: Not on file    Active Member of [address] [place] [address] or Organizations: Not on file    Attends Club or Organization Meetings: Not on file    Marital Status: Not on file   Intimate Partner Violence:     Fear of Current or Ex-Partner: Not on file    Emotionally Abused: Not on file    Physically Abused: Not on file    Sexually Abused: Not on file   Housing Stability:     Unable to Pay for Housing in the Last Year: Not on file    Number of [place] in the Last Year: Not on file    Unstable Housing in the Last Year: Not on file        SURGICAL HISTORY  Past Surgical History:   Procedure Laterality Date    APPENDECTOMY  02/1998    Done When Tubes And Ovaries Were Removed    CARDIAC CATHETERIZATION  10/24/2010    CATHETER REMOVAL N/A 7/16/2019    PORT REMOVAL performed by Eleni Guzman MD at [place] [address]  08/27/1991   Elia Mabry, LAPAROSCOPIC  [address]  COLONOSCOPY  Last Done In 2000's   [facility]      Teeth Extracted In Past    ENDOSCOPY, COLON, DIAGNOSTIC  Last Done In 2018    FOOT DEBRIDEMENT Left 6/16/2019    FIRST METATARSAL DEBRIDEMENT INCISION AND DRAINAGE. EXCISION OF ULCER.  RESECTION OF BONE. 1ST METATARSAL POWER LAVAGE AND BONE CEMENT performed by Azra Yates DPM at [place] [address] Left Last Done In 2016     Dr. Bev Last, \" About 6 Surgeries Done Because Of Staph Infection\"    HIATAL HERNIA REPAIR N/A 2/12/2019    HERNIA HIATAL LAPAROSCOPIC ROBOTIC performed by Adis Hall MD at [facility]  10/1997    Partial Abdominal Hysterectomy    INSERTION / REMOVAL / REPLACEMENT VENOUS ACCESS CATHETER N/A 8/15/2019    PORT INSERTION performed by Adis Hall MD at [facility]    removed after 6 months    LEG BIOPSY EXCISION Left 3/8/2021    LEFT THIGH LESION BIOPSY EXCISION performed by Adis Hall MD at [facility], PARTIAL Left 2008    Benign    OTHER SURGICAL HISTORY  02/1998    \"Tubes And Ovaries Removed, Appendectomy Also Done\"    OTHER SURGICAL HISTORY  Last Done 7-15-16    Mediport Insertion \"Total Of Six Done, Removed Last Mediport In 2014\"    PORT SURGERY N/A 1/15/2020    PORT REMOVAL performed by Adis Hall MD at [place] [address] N/A 7/6/2020    PORT INSERTION performed by Adis Hall MD at [place] [address] Left 8/3/2021    MEDIPORT REPLACEMENT performed by Adis Hall MD at [address] N/A 8/16/2021    GASTRIC BYPASS RUFINO-EN-Y LAPAROSCOPIC ROBOTIC, LYSIS OF ADHESIONS performed by Adis Hall MD at [address] N/A 2/12/2019    GASTRECTOMY SLEEVE LAPAROSCOPIC ROBOTIC performed by Adis Hall MD at [facility]  08/27/2018    UPPER GASTROINTESTINAL ENDOSCOPY N/A 4/2/2019    EGD CONTROL HEMORRHAGE with epi injection at bleeding site performed by Anna Pillai MD at [address] [place] [address] N/A 6/19/2019    EGD DIAGNOSTIC ONLY performed by Anna Pillai MD at [address] [place] [address] N/A 7/22/2019    EGD DIAGNOSTIC ONLY performed by Chris Llamas MD at [address] [place] [address] N/A 10/14/2019    EGD DIAGNOSTIC ONLY performed by Anna Pillai MD at [address] [place] [address] N/A 7/17/2020    EGJ DILATATION BALLOON WITH 18-20 MM BALLOON, DILATED TO 20 MM. performed by Anna Pillai MD at [address] [place] [address] N/A 5/28/2021    EGD BIOPSY performed by Anna Pillai MD at [address] [place] [address]  Current Outpatient Medications   Medication Sig Dispense Refill    methylPREDNISolone (MEDROL, SOFIA,) 4 MG tablet Use as directed 1 kit 0    promethazine (PHENERGAN) 25 MG tablet Take 1 tablet by mouth every 8 hours as needed for Nausea 15 tablet 0    hydrOXYzine (VISTARIL) 50 MG capsule Take 1 capsule by mouth 2 times daily 30 capsule 0    atenolol (TENORMIN) 25 MG tablet Take 1 tablet by mouth once daily 30 tablet 0    cloNIDine (CATAPRES) 0.1 MG tablet Take 1 tablet by mouth 2 times daily 60 tablet 3    estradiol (ESTRACE) 0.5 MG tablet Take 1 tablet by mouth daily 30 tablet 0    PARoxetine (PAXIL) 30 MG tablet Take 1.5 tablets by mouth nightly 45 tablet 3    levothyroxine (SYNTHROID) 125 MCG tablet Take 1 tablet by mouth Daily 30 tablet 3    benzocaine (ORAJEL) 10 % mucosal gel Apply to the ulcer 2-3 times a day 1 each 0    albuterol (PROVENTIL) (2.5 MG/3ML) 0.083% nebulizer solution Take 3 mLs by nebulization every 6 hours as needed for Wheezing 120 each 0    albuterol sulfate  (90 Base) MCG/ACT inhaler Inhale 2 puffs into the lungs 4 times daily 1 each 0    oxyCODONE-acetaminophen (PERCOCET) 5-325 MG per tablet TAKE 1 TABLET BY MOUTH EVERY 6 HOURS AS NEEDED FOR 28 DAYS      NARCAN 4 MG/0.1ML LIQD nasal spray USE AS DIRECTED AS NEEDED FOR SUSPECTED OPIOID OVERDOSE      pantoprazole (PROTONIX) 40 MG tablet Take 1 tablet by mouth 2 times daily 60 tablet 3    ondansetron (ZOFRAN ODT) 4 MG disintegrating tablet Take 1 tablet by mouth every 4 hours as needed for Nausea or Vomiting 30 tablet 3    scopolamine (TRANSDERM-SCOP, 1.5 MG,) transdermal patch Place 1 patch onto the skin every 72 hours 10 patch 11    scopolamine (TRANSDERM-SCOP) transdermal patch APPLY 1 PATCH TRANSDERMALLY TO THE SKIN BEHIND THE EAR ONCE EVERY 3 DAYS AS NEEDED 10 patch 0    Melatonin 10 MG TABS Take 10-20 mg by mouth nightly as needed      Cyanocobalamin (VITAMIN B 12 PO) Take 1 tablet by mouth daily      meclizine (ANTIVERT) 25 MG tablet Take 25 mg by mouth 3 times daily as needed for Dizziness      levETIRAcetam (KEPPRA) 1000 MG tablet Take 1 tablet by mouth 2 times daily 60 tablet 3    betamethasone valerate (VALISONE) 0.1 % ointment APPLY OINTMENT TO AFFECTED AREA TWICE DAILY TO HANDS AND ELBOWS FOR 21 DAYS      potassium gluconate 550 mg tablet Take 1 tablet by mouth daily      tiZANidine (ZANAFLEX) 4 MG tablet Take 4 mg by mouth every 8 hours as needed       ferrous sulfate (IRON 325) 325 (65 Fe) MG tablet Take 1 tablet by mouth daily (with breakfast) 90 tablet 5    Cholecalciferol (VITAMIN D3) 5000 units TABS Take 1 tablet by mouth daily      Multiple Vitamin (MVI, BARIATRIC ADVANTAGE MULTI-FORMULA, CHEW TAB) Take 1 tablet by mouth daily 30 tablet 5    Calcium Citrate-Vitamin D (CALCIUM + VIT D, BARIATRIC ADVANTAGE, CHEWABLE TABLET) Take 1 tablet by mouth 2 times daily 120 tablet 5    gabapentin (NEURONTIN) 800 MG tablet Take 800 mg by mouth 3 times daily       No current facility-administered medications for this visit.        ALLERGIES  Allergies   Allergen Reactions    Aspirin Palpitations     \"My Heart Rate Elevates\"    Compazine [Prochlorperazine] Rash    Shellfish-Derived Products Swelling    Toradol [Ketorolac Tromethamine] Rash    Haldol [Haloperidol]      Shaking      Reglan [Metoclopramide] Itching       PHYSICAL EXAM    Pulse 63   Temp 97.3 °F (36.3 °C)   Resp 12   Wt 250 lb (113.4 kg)   SpO2 98%   BMI 42.91 kg/m²     Constitutional:  Well developed, well nourished  HENT:  Normocephalic, atraumatic, bilateral external ears normal, bilateral ear canals and TMs normal, oropharynx moist with postnasal drip. No petechiae or exudate. Uvula midline and benign and airway patent. Clear rhinorrhea. Sinuses nontender  Eyes:  conjunctiva normal, no discharge, no scleral icterus  Cardiovascular:  Normal heart rate, normal rhythm, no murmurs, gallops or rubs  Thorax & Lungs:  Normal breath sounds, no respiratory distress, no wheezing  Skin:  Warm, dry, no erythema, no rash  Neurologic:  Alert & oriented   Psychiatric:  Affect normal, mood normal    ASSESSMENT & PLAN    Andre was seen today for sinusitis. Diagnoses and all orders for this visit:    Sinus pressure  -     Covid-19 Ambulatory  -     methylPREDNISolone (MEDROL, SOFIA,) 4 MG tablet; Use as directed    Sinus headache  -     Covid-19 Ambulatory  -     methylPREDNISolone (MEDROL, SOFIA,) 4 MG tablet; Use as directed    Post-nasal drip  -     Covid-19 Ambulatory  -     methylPREDNISolone (MEDROL, SOFIA,) 4 MG tablet; Use as directed    Nausea in adult  -     Covid-19 Ambulatory  -     promethazine (PHENERGAN) 25 MG tablet; Take 1 tablet by mouth every 8 hours as needed for Nausea       Discussed likely viral etiology and supportive measures. Discussed isolation measures while awaiting COVID-19 test results  Increase fluids and rest  Saline nasal spray as needed for nasal congestion  Warm salt gargles as needed for throat discomfort  Big deep breaths periodically throughout the day  Medrol Dose Sofia as directed  Phenergan as needed  If symptoms worsen -Go to the ER.    Follow up with your primary care provider    There are no discontinued medications. No follow-ups on file. Plan of care reviewed with patient who verbalizes understanding and wishes to continue. Patient verbalizes understanding with the above plan and is in agreement. Patient will call with  worsening of symptoms, questions or concerns. Please note that this chart was generated using dragon dictation software. Although every effort was made to ensure the accuracy of this automated transcription, some errors in transcription may have occurred.     Electronically signed by Cindy Preciado PA-C on 12/2/2021

## 2021-12-03 LAB
SARS-COV-2: NOT DETECTED
SOURCE: NORMAL

## 2021-12-20 DIAGNOSIS — R11.0 NAUSEA IN ADULT: ICD-10-CM

## 2021-12-20 DIAGNOSIS — E89.40 SURGICAL MENOPAUSE: ICD-10-CM

## 2021-12-20 DIAGNOSIS — F41.9 ANXIETY: ICD-10-CM

## 2021-12-20 DIAGNOSIS — E03.4 HYPOTHYROIDISM DUE TO ACQUIRED ATROPHY OF THYROID: ICD-10-CM

## 2021-12-20 DIAGNOSIS — I10 ESSENTIAL HYPERTENSION: ICD-10-CM

## 2021-12-20 RX ORDER — ESTRADIOL 0.5 MG/1
0.5 TABLET ORAL DAILY
Qty: 30 TABLET | Refills: 2 | Status: SHIPPED | OUTPATIENT
Start: 2021-12-20 | End: 2022-05-06 | Stop reason: SDUPTHER

## 2021-12-20 RX ORDER — ATENOLOL 25 MG/1
TABLET ORAL
Qty: 30 TABLET | Refills: 2 | Status: SHIPPED | OUTPATIENT
Start: 2021-12-20 | End: 2022-04-06 | Stop reason: SDUPTHER

## 2021-12-20 RX ORDER — PANTOPRAZOLE SODIUM 40 MG/1
40 TABLET, DELAYED RELEASE ORAL 2 TIMES DAILY
Qty: 60 TABLET | Refills: 2 | Status: SHIPPED | OUTPATIENT
Start: 2021-12-20 | End: 2022-04-06 | Stop reason: SDUPTHER

## 2021-12-20 RX ORDER — LEVOTHYROXINE SODIUM 0.12 MG/1
125 TABLET ORAL DAILY
Qty: 30 TABLET | Refills: 2 | Status: SHIPPED | OUTPATIENT
Start: 2021-12-20 | End: 2022-02-07 | Stop reason: SDUPTHER

## 2021-12-20 RX ORDER — PROMETHAZINE HYDROCHLORIDE 25 MG/1
25 TABLET ORAL EVERY 8 HOURS PRN
Qty: 15 TABLET | Refills: 0 | Status: SHIPPED | OUTPATIENT
Start: 2021-12-20 | End: 2022-01-06 | Stop reason: SDUPTHER

## 2021-12-20 RX ORDER — HYDROXYZINE PAMOATE 50 MG/1
50 CAPSULE ORAL 2 TIMES DAILY
Qty: 30 CAPSULE | Refills: 2 | Status: SHIPPED | OUTPATIENT
Start: 2021-12-20 | End: 2022-02-07 | Stop reason: SDUPTHER

## 2021-12-20 NOTE — TELEPHONE ENCOUNTER
Called to confirm patient is out of town and had to reschedule appt 2/7/22. She is out of medication can refill until next appt.   Please notify patient

## 2022-01-06 DIAGNOSIS — R11.0 NAUSEA IN ADULT: ICD-10-CM

## 2022-01-06 RX ORDER — PROMETHAZINE HYDROCHLORIDE 25 MG/1
25 TABLET ORAL EVERY 8 HOURS PRN
Qty: 15 TABLET | Refills: 0 | Status: SHIPPED | OUTPATIENT
Start: 2022-01-06 | End: 2022-01-20 | Stop reason: SDUPTHER

## 2022-01-06 NOTE — TELEPHONE ENCOUNTER
----- Message from Marifer Connolly sent at 1/6/2022 12:45 PM EST -----  Subject: Refill Request    QUESTIONS  Name of Medication? promethazine (PHENERGAN) 25 MG tablet  Patient-reported dosage and instructions? 25MG; taken every 4 to 6 hours   as needed but usually b.i.d. in the mornings and nights  How many days do you have left? 0  Preferred Pharmacy? [facility]  Pharmacy phone number (if available)? [phone]  Additional Information for Provider? [facility] received a call from Pt (Gregory Bravo)? Pt needs these Rx's refilled. Pt's PCP is Lele and he has   given these Rx's in the past. Pt needs to be called on her son's phone as   she is waiting on her new phone. Pt will be out of town starting 1/8/22   and may be able to get the refills transferred if they are not refilled   before she leaves. Pt will call back if she needs any other Rx's refilled. ---------------------------------------------------------------------------  --------------,  Name of Medication? albuterol sulfate  (90 Base) MCG/ACT inhaler  Patient-reported dosage and instructions? Pt unsure of dosage; 46hours PRN  How many days do you have left? 2  Preferred Pharmacy? [facility]  Pharmacy phone number (if available)? [phone]  ---------------------------------------------------------------------------  --------------  CALL BACK INFO  What is the best way for the office to contact you? OK to leave message on   voicemail  Preferred Call Back Phone Number?  [phone]

## 2022-01-20 ENCOUNTER — TELEPHONE (OUTPATIENT)
Dept: INTERNAL MEDICINE CLINIC | Age: 52
End: 2022-01-20

## 2022-01-20 DIAGNOSIS — R11.0 NAUSEA IN ADULT: ICD-10-CM

## 2022-01-20 RX ORDER — PROMETHAZINE HYDROCHLORIDE 25 MG/1
25 TABLET ORAL EVERY 8 HOURS PRN
Qty: 15 TABLET | Refills: 0 | Status: SHIPPED | OUTPATIENT
Start: 2022-01-20 | End: 2022-02-07 | Stop reason: SDUPTHER

## 2022-01-20 NOTE — TELEPHONE ENCOUNTER
Sinus drainage and nausea request phenergan called in to [facility]  In [facility]. She does not leave the house and does not want to test fro covid.

## 2022-01-20 NOTE — TELEPHONE ENCOUNTER
She has been taking Phenergan and Zofran very frequently. I will call in some Phenergan but she has to see her bariatric surgeon for further recommendations.

## 2022-02-07 ENCOUNTER — TELEMEDICINE (OUTPATIENT)
Dept: INTERNAL MEDICINE CLINIC | Age: 52
End: 2022-02-07
Payer: COMMERCIAL

## 2022-02-07 DIAGNOSIS — Z12.31 ENCOUNTER FOR SCREENING MAMMOGRAM FOR MALIGNANT NEOPLASM OF BREAST: ICD-10-CM

## 2022-02-07 DIAGNOSIS — I10 PRIMARY HYPERTENSION: ICD-10-CM

## 2022-02-07 DIAGNOSIS — E55.9 VITAMIN D DEFICIENCY: ICD-10-CM

## 2022-02-07 DIAGNOSIS — F41.9 ANXIETY: ICD-10-CM

## 2022-02-07 DIAGNOSIS — R11.0 CHRONIC NAUSEA: ICD-10-CM

## 2022-02-07 DIAGNOSIS — E03.4 HYPOTHYROIDISM DUE TO ACQUIRED ATROPHY OF THYROID: ICD-10-CM

## 2022-02-07 DIAGNOSIS — G40.909 SEIZURE DISORDER (HCC): ICD-10-CM

## 2022-02-07 DIAGNOSIS — J44.9 CHRONIC OBSTRUCTIVE PULMONARY DISEASE, UNSPECIFIED COPD TYPE (HCC): ICD-10-CM

## 2022-02-07 DIAGNOSIS — D50.0 IRON DEFICIENCY ANEMIA DUE TO CHRONIC BLOOD LOSS: ICD-10-CM

## 2022-02-07 DIAGNOSIS — Z01.419 WELL WOMAN EXAM WITH ROUTINE GYNECOLOGICAL EXAM: ICD-10-CM

## 2022-02-07 DIAGNOSIS — M32.9 SYSTEMIC LUPUS ERYTHEMATOSUS, UNSPECIFIED SLE TYPE, UNSPECIFIED ORGAN INVOLVEMENT STATUS (HCC): Chronic | ICD-10-CM

## 2022-02-07 DIAGNOSIS — Z98.84 HISTORY OF ROUX-EN-Y GASTRIC BYPASS: ICD-10-CM

## 2022-02-07 DIAGNOSIS — K21.9 GASTROESOPHAGEAL REFLUX DISEASE WITHOUT ESOPHAGITIS: ICD-10-CM

## 2022-02-07 DIAGNOSIS — E66.01 MORBID OBESITY WITH BMI OF 40.0-44.9, ADULT (HCC): Primary | ICD-10-CM

## 2022-02-07 DIAGNOSIS — F32.A CHRONIC DEPRESSION: ICD-10-CM

## 2022-02-07 PROBLEM — M85.80 OSTEOPENIA: Status: RESOLVED | Noted: 2021-05-12 | Resolved: 2022-02-07

## 2022-02-07 PROCEDURE — 3017F COLORECTAL CA SCREEN DOC REV: CPT | Performed by: INTERNAL MEDICINE

## 2022-02-07 PROCEDURE — G8484 FLU IMMUNIZE NO ADMIN: HCPCS | Performed by: INTERNAL MEDICINE

## 2022-02-07 PROCEDURE — 3023F SPIROM DOC REV: CPT | Performed by: INTERNAL MEDICINE

## 2022-02-07 PROCEDURE — 99214 OFFICE O/P EST MOD 30 MIN: CPT | Performed by: INTERNAL MEDICINE

## 2022-02-07 PROCEDURE — G8417 CALC BMI ABV UP PARAM F/U: HCPCS | Performed by: INTERNAL MEDICINE

## 2022-02-07 PROCEDURE — G8427 DOCREV CUR MEDS BY ELIG CLIN: HCPCS | Performed by: INTERNAL MEDICINE

## 2022-02-07 PROCEDURE — 1036F TOBACCO NON-USER: CPT | Performed by: INTERNAL MEDICINE

## 2022-02-07 RX ORDER — PAROXETINE 30 MG/1
45 TABLET, FILM COATED ORAL NIGHTLY
Qty: 45 TABLET | Refills: 3 | Status: SHIPPED | OUTPATIENT
Start: 2022-02-07 | End: 2022-09-12

## 2022-02-07 RX ORDER — LEVOTHYROXINE SODIUM 0.12 MG/1
125 TABLET ORAL DAILY
Qty: 30 TABLET | Refills: 2 | Status: SHIPPED | OUTPATIENT
Start: 2022-02-07 | End: 2022-05-06 | Stop reason: SDUPTHER

## 2022-02-07 RX ORDER — PROMETHAZINE HYDROCHLORIDE 25 MG/1
25 TABLET ORAL EVERY 8 HOURS PRN
Qty: 30 TABLET | Refills: 0 | Status: SHIPPED | OUTPATIENT
Start: 2022-02-07 | End: 2022-04-06 | Stop reason: SDUPTHER

## 2022-02-07 RX ORDER — HYDROXYZINE PAMOATE 50 MG/1
50 CAPSULE ORAL 2 TIMES DAILY
Qty: 30 CAPSULE | Refills: 2 | Status: SHIPPED | OUTPATIENT
Start: 2022-02-07 | End: 2022-04-06 | Stop reason: SDUPTHER

## 2022-02-07 SDOH — ECONOMIC STABILITY: FOOD INSECURITY: WITHIN THE PAST 12 MONTHS, YOU WORRIED THAT YOUR FOOD WOULD RUN OUT BEFORE YOU GOT MONEY TO BUY MORE.: NEVER TRUE

## 2022-02-07 SDOH — ECONOMIC STABILITY: FOOD INSECURITY: WITHIN THE PAST 12 MONTHS, THE FOOD YOU BOUGHT JUST DIDN'T LAST AND YOU DIDN'T HAVE MONEY TO GET MORE.: NEVER TRUE

## 2022-02-07 ASSESSMENT — SOCIAL DETERMINANTS OF HEALTH (SDOH): HOW HARD IS IT FOR YOU TO PAY FOR THE VERY BASICS LIKE FOOD, HOUSING, MEDICAL CARE, AND HEATING?: NOT HARD AT ALL

## 2022-02-07 NOTE — PROGRESS NOTES
Name: Diana Pradhan  [id number]  Age: 46 y.o. YOB: 1970  Sex: female    CHIEF COMPLAINT:    Chief Complaint   Patient presents with    Hypertension    Hypothyroidism    Other     other chronic conditions        Andre Oharatt, was evaluated through a synchronous (real-time) audio-video encounter. The patient (or guardian if applicable) is aware that this is a billable service, which includes applicable co-pays. This Virtual Visit was conducted with patient's (and/or legal guardian's) consent. The visit was conducted pursuant to the emergency declaration under the [facility], [address] [place] [address] authority and the [employer] Act. Patient identification was verified, and a caregiver was present when appropriate. The patient was located in a state where the provider was licensed to provide care. HISTORY OF PRESENT ILLNESS:     This is a pleasant  46 y.o. female  is seen today for management of chronic medical problems and medications refills. Previous records reviewed . Patient claims that her father tested positive for COVID-19 and she was exposed to it but she tested it and it was negative. She denies any fever, sore throat, cough or chest congestion. She had gastric bypass surgery on 8/16/2021. She lost some weight initially but she apparently did not lose much weight since her last visit  She has generalized aches and pains for which she is seeing pain management doctor and taking Percocet from them. Denies any chest pain or shortness of breath. Denies any abdominal pain. She has chronic nausea and takes Phenergan frequently. Bowels are moving okay. No urinary symptoms. Has chronic low back pain. Hearing is good. Vision okay with glasses. Denies any significant skin lesions. She has a history of seizure disorder but no recent seizures.   She has been fully vaccinated for COVID-19 but has not had had any booster dose yet. Labs from 9/12/2021 reviewed and explained to the patient. Past Medical History:    Patient Active Problem List   Diagnosis    Fibromyalgia    Lupus (systemic lupus erythematosus) (HCC)    Chronic pancreatitis (HCC)    HTN (hypertension)    Frequent UTI    Gastroesophageal reflux disease without esophagitis    Chronic depression    Fatty liver disease, nonalcoholic    Arthritis    Bilateral low back pain with left-sided sciatica    Hiatal hernia    Pseudoseizure    Chronic pain syndrome    Drug-seeking/Aberrant behavior    Lymph node disorder    Morbid obesity with BMI of 40.0-44.9, adult (HCC)    Iron deficiency anemia    History of Jet-en-Y gastric bypass    Anxiety    RUQ pain    Discoloration of skin of flank resembling ecchymosis    Chest pain of uncertain etiology    Cellulitis of left thigh    Malabsorption    COPD (chronic obstructive pulmonary disease) (HCC)    Chronic nausea    Hypothyroidism due to acquired atrophy of thyroid    Vitamin D deficiency    Irritable bowel syndrome    Malabsorption due to intolerance, not elsewhere classified    Port-A-Cath in place        Past Surgical History:        Procedure Laterality Date    APPENDECTOMY  02/1998    Done When Tubes And Ovaries Were Removed    CARDIAC CATHETERIZATION  10/24/2010    CATHETER REMOVAL N/A 7/16/2019    PORT REMOVAL performed by Aaron Rothman MD at [facility]  08/27/1991    CHOLECYSTECTOMY, LAPAROSCOPIC  1990's    COLONOSCOPY  Last Done In 2000's    DENTAL SURGERY      Teeth Extracted In Past    ENDOSCOPY, COLON, DIAGNOSTIC  Last Done In 2018    FOOT DEBRIDEMENT Left 6/16/2019    FIRST METATARSAL DEBRIDEMENT INCISION AND DRAINAGE. EXCISION OF ULCER.  RESECTION OF BONE. 1ST METATARSAL POWER LAVAGE AND BONE CEMENT performed by Renetta Antoine DPM at [address] Left Last Done In 2016     Dr. Brandan Arteaga, \" About 6 Surgeries Done Because Of Staph Infection\"    HIATAL HERNIA REPAIR N/A 2/12/2019    HERNIA HIATAL LAPAROSCOPIC ROBOTIC performed by Ledy Rose MD at [address]  10/1997    Partial Abdominal Hysterectomy    INSERTION / REMOVAL / REPLACEMENT VENOUS ACCESS CATHETER N/A 8/15/2019    PORT INSERTION performed by Ledy Rose MD at [address] [facility]    removed after 6 months    LEG BIOPSY EXCISION Left 3/8/2021    LEFT THIGH LESION BIOPSY EXCISION performed by Ledy Rose MD at [facility], PARTIAL Left 2008    Benign    OTHER SURGICAL HISTORY  02/1998    \"Tubes And Ovaries Removed, Appendectomy Also Done\"    OTHER SURGICAL HISTORY  Last Done 7-15-16    Mediport Insertion \"Total Of Six Done, Removed Last Mediport In 2014\"    PORT SURGERY N/A 1/15/2020    PORT REMOVAL performed by Ledy Rose MD at [facility] N/A 7/6/2020    PORT INSERTION performed by Ledy Rose MD at [facility] Left 8/3/2021    MEDIPORT REPLACEMENT performed by Ledy Rose MD at [address] [place] [address] N/A 8/16/2021    GASTRIC BYPASS RUFINO-EN-Y LAPAROSCOPIC ROBOTIC, LYSIS OF ADHESIONS performed by Ledy Rose MD at [facility] N/A 2/12/2019    GASTRECTOMY SLEEVE LAPAROSCOPIC ROBOTIC performed by Ledy Rose MD at [address] [place] [address]  08/27/2018    UPPER GASTROINTESTINAL ENDOSCOPY N/A 4/2/2019    EGD CONTROL HEMORRHAGE with epi injection at bleeding site performed by Ledy Rose MD at [place] [address] 6/19/2019    EGD DIAGNOSTIC ONLY performed by Ledy Rose MD at [facility] N/A 7/22/2019    EGD DIAGNOSTIC ONLY performed by Uriel Guidry MD at [facility] 10/14/2019    EGD DIAGNOSTIC ONLY performed by Ledy Rose MD at [facility] ENDOSCOPY    UPPER GASTROINTESTINAL ENDOSCOPY N/A 7/17/2020    EGJ DILATATION BALLOON WITH 18-20 MM BALLOON, DILATED TO 20 MM. performed by Flynn Pedroza MD at [place] [address] N/A 5/28/2021    EGD BIOPSY performed by Flynn Pedroza MD at [facility] ENDOSCOPY       Social History:   Social History     Tobacco Use    Smoking status: Never Smoker    Smokeless tobacco: Never Used   Substance Use Topics    Alcohol use: No     Alcohol/week: 0.0 standard drinks       Family History:       Problem Relation Age of Onset    Diabetes Mother         \"Borderline Diabetes\"    High Blood Pressure Mother     Obesity Mother     Arthritis Mother     Heart Disease Mother     High Cholesterol Mother     Vision Loss Mother     Diabetes Father     High Blood Pressure Father     Asthma Father     Cancer Father         prostate cancer    High Blood Pressure Brother     Asthma Son     Vision Loss Son     Lupus Daughter     Other Daughter         \"Alot Of Female Problems\"       Allergies:  Aspirin, Compazine [prochlorperazine], Shellfish-derived products, Toradol [ketorolac tromethamine], Haldol [haloperidol], and Reglan [metoclopramide]    Current Medications :      Prior to Admission medications    Medication Sig Start Date End Date Taking?  Authorizing Provider   levothyroxine (SYNTHROID) 125 MCG tablet Take 1 tablet by mouth Daily 2/7/22  Yes Jessica Keyes MD   PARoxetine (PAXIL) 30 MG tablet Take 1.5 tablets by mouth nightly 2/7/22  Yes Jessica Keyes MD   promethazine (PHENERGAN) 25 MG tablet Take 1 tablet by mouth every 8 hours as needed for Nausea 2/7/22  Yes Jessica Keyes MD   hydrOXYzine (VISTARIL) 50 MG capsule Take 1 capsule by mouth 2 times daily 2/7/22  Yes Jessica Keyes MD   atenolol (TENORMIN) 25 MG tablet Take 1 tablet by mouth once daily 12/20/21  Yes Jessica Keyes MD   estradiol (ESTRACE) 0.5 MG tablet Take 1 tablet by mouth daily 12/20/21  Yes Jessica Keyes MD pantoprazole (PROTONIX) 40 MG tablet Take 1 tablet by mouth 2 times daily 12/20/21  Yes Ray Carrero MD   cloNIDine (CATAPRES) 0.1 MG tablet Take 1 tablet by mouth 2 times daily 9/16/21  Yes Ray Carrero MD   benzocaine (ORAJEL) 10 % mucosal gel Apply to the ulcer 2-3 times a day 9/16/21  Yes Ray Carrero MD   albuterol (PROVENTIL) (2.5 MG/3ML) 0.083% nebulizer solution Take 3 mLs by nebulization every 6 hours as needed for Wheezing 9/9/21  Yes Edward Jackson MD   albuterol sulfate  (90 Base) MCG/ACT inhaler Inhale 2 puffs into the lungs 4 times daily 9/9/21  Yes Edward Jackson MD   oxyCODONE-acetaminophen (PERCOCET) 5-325 MG per tablet TAKE 1 TABLET BY MOUTH EVERY 6 HOURS AS NEEDED FOR 28 DAYS 8/28/21  Yes Historical Provider, MD   NARCAN 4 MG/0.1ML LIQD nasal spray USE AS DIRECTED AS NEEDED FOR SUSPECTED OPIOID OVERDOSE 8/20/21  Yes Historical Provider, MD   ondansetron (ZOFRAN ODT) 4 MG disintegrating tablet Take 1 tablet by mouth every 4 hours as needed for Nausea or Vomiting 8/18/21  Yes Sandrine Reddy MD   scopolamine (TRANSDERM-SCOP, 1.5 MG,) transdermal patch Place 1 patch onto the skin every 72 hours 8/18/21 8/18/22 Yes Sandrine Reddy MD   scopolamine (TRANSDERM-SCOP) transdermal patch APPLY 1 PATCH TRANSDERMALLY TO THE SKIN BEHIND THE EAR ONCE EVERY 3 DAYS AS NEEDED 7/4/21  Yes Jared Kiser PA-C   Melatonin 10 MG TABS Take 10-20 mg by mouth nightly as needed   Yes Historical Provider, MD   Cyanocobalamin (VITAMIN B 12 PO) Take 1 tablet by mouth daily   Yes Historical Provider, MD   meclizine (ANTIVERT) 25 MG tablet Take 25 mg by mouth 3 times daily as needed for Dizziness   Yes Historical Provider, MD   levETIRAcetam (KEPPRA) 1000 MG tablet Take 1 tablet by mouth 2 times daily 5/13/21  Yes Ray Carrero MD   betamethasone valerate (VALISONE) 0.1 % ointment APPLY OINTMENT TO AFFECTED AREA TWICE DAILY TO HANDS AND ELBOWS FOR 21 DAYS 3/26/21  Yes Historical Provider, MD potassium gluconate 550 mg tablet Take 1 tablet by mouth daily 3/25/21  Yes Historical Provider, MD   tiZANidine (ZANAFLEX) 4 MG tablet Take 4 mg by mouth every 8 hours as needed  3/3/21  Yes Historical Provider, MD   ferrous sulfate (IRON 325) 325 (65 Fe) MG tablet Take 1 tablet by mouth daily (with breakfast) 10/23/20  Yes Soy Busby MD   Cholecalciferol (VITAMIN D3) 5000 units TABS Take 1 tablet by mouth daily   Yes Historical Provider, MD   Multiple Vitamin (MVI, BARIATRIC ADVANTAGE MULTI-FORMULA, CHEW TAB) Take 1 tablet by mouth daily 2/22/19  Yes Soy Busby MD   Calcium Citrate-Vitamin D (CALCIUM + VIT D, BARIATRIC ADVANTAGE, CHEWABLE TABLET) Take 1 tablet by mouth 2 times daily 2/22/19  Yes Soy Busby MD   gabapentin (NEURONTIN) 800 MG tablet Take 800 mg by mouth 3 times daily   Yes Historical Provider, MD       LAB DATA: Reviewed. REVIEW OF SYSTEMS:   see HPI/ Comprehensive review of systems negative except for the ones mentioned in HPI. PHYSICAL EXAMINATION:   There were no vitals taken for this visit. No physical exam for this visit. ASSESSMENT/PLAN:    1. Morbid obesity with BMI of 40.0-44.9, adult ([facility]  2. History of Jet-en-Y gastric bypass  Advised diet, exercise and weight loss and advised to continue to follow with her bariatric surgeon    3. Hypothyroidism due to acquired atrophy of thyroid  Continue Synthroid  - levothyroxine (SYNTHROID) 125 MCG tablet; Take 1 tablet by mouth Daily  Dispense: 30 tablet; Refill: 2    4. Chronic depression  Continue Paxil  - PARoxetine (PAXIL) 30 MG tablet; Take 1.5 tablets by mouth nightly  Dispense: 45 tablet; Refill: 3    5. Anxiety  Continue Vistaril as needed. Advised to follow with his psychiatrist.  - hydrOXYzine (VISTARIL) 50 MG capsule; Take 1 capsule by mouth 2 times daily  Dispense: 30 capsule; Refill: 2    6. Vitamin D deficiency  Continue vitamin D3    7. Seizure disorder ([facility]  Lakhwinder on Keppra    8.  Systemic lupus erythematosus, unspecified SLE type, unspecified organ involvement status ([facility]  History of systemic lupus. No symptoms now. 9. Iron deficiency anemia due to chronic blood loss  Continue iron pills    10. Gastroesophageal reflux disease without esophagitis  Continue Protonix    11. Chronic obstructive pulmonary disease, unspecified COPD type (HCC)  New inhalers. 12. Primary hypertension  Patient on Catapres and Tenormin. 15. Well woman exam with routine gynecological exam  Referred to gynecologist  - Rhona Cast MD, Kemar Sims    14. Chronic nausea  Continue Transderm scopolamine and Phenergan as needed  - promethazine (PHENERGAN) 25 MG tablet; Take 1 tablet by mouth every 8 hours as needed for Nausea  Dispense: 30 tablet; Refill: 0    15. Encounter for screening mammogram for malignant neoplasm of breast  - TERRI DIGITAL SCREEN W OR WO CAD BILATERAL; Future    Advised to continue to follow COVID-19 precautions    Care discussed with patient. Questions answered and patient verbalizes understanding and agrees with plan. Medications reviewed and reconciled. Continue current medications. Appropriate prescriptions are ordered. Risks and benefits of meds are discussed. After visit summary provided. Advised to call for any problems, questions, or concerns. If symptoms worsen or don't improve as expected, to call us or go to ER. Follow up as directed, sooner if needed. Return in about 3 months (around 5/7/2022). This dictation was performed with a verbal recognition program and it was checked for errors. It is possible that there are still dictated errors within this office note. Any errors should be brought immediately to my attention for correction. All efforts were made to ensure that this office note is accurate.      Jarred Scott MD MD

## 2022-02-28 ENCOUNTER — TELEPHONE (OUTPATIENT)
Dept: INTERNAL MEDICINE CLINIC | Age: 52
End: 2022-02-28

## 2022-02-28 DIAGNOSIS — M19.90 ARTHRITIS: Primary | ICD-10-CM

## 2022-02-28 NOTE — TELEPHONE ENCOUNTER
Referral to Dr. Emerick Boxer the foot doctor.  Already has an Appointment for Thursday at 10:45 Mar 3

## 2022-03-01 ENCOUNTER — TELEPHONE (OUTPATIENT)
Dept: INTERNAL MEDICINE CLINIC | Age: 52
End: 2022-03-01

## 2022-03-17 ENCOUNTER — HOSPITAL ENCOUNTER (EMERGENCY)
Age: 52
Discharge: HOME | End: 2022-03-17
Payer: COMMERCIAL

## 2022-03-17 ENCOUNTER — APPOINTMENT (OUTPATIENT)
Dept: GENERAL RADIOLOGY | Age: 52
End: 2022-03-17
Payer: COMMERCIAL

## 2022-03-17 VITALS
DIASTOLIC BLOOD PRESSURE: 63 MMHG | TEMPERATURE: 98.6 F | RESPIRATION RATE: 20 BRPM | OXYGEN SATURATION: 100 % | SYSTOLIC BLOOD PRESSURE: 123 MMHG | HEART RATE: 55 BPM

## 2022-03-17 DIAGNOSIS — R10.13 ABDOMINAL PAIN, EPIGASTRIC: Primary | ICD-10-CM

## 2022-03-17 DIAGNOSIS — R11.2 NON-INTRACTABLE VOMITING WITH NAUSEA, UNSPECIFIED VOMITING TYPE: ICD-10-CM

## 2022-03-17 LAB
ALBUMIN SERPL-MCNC: 3.8 GM/DL (ref 3.4–5)
ALP BLD-CCNC: 121 IU/L (ref 40–128)
ALT SERPL-CCNC: 11 U/L (ref 10–40)
ANION GAP SERPL CALCULATED.3IONS-SCNC: 11 MMOL/L (ref 4–16)
AST SERPL-CCNC: 14 IU/L (ref 15–37)
BACTERIA: NEGATIVE /HPF
BASOPHILS ABSOLUTE: 0 K/CU MM
BASOPHILS RELATIVE PERCENT: 1 % (ref 0–1)
BILIRUB SERPL-MCNC: 0.2 MG/DL (ref 0–1)
BILIRUBIN URINE: NEGATIVE MG/DL
BLOOD, URINE: NEGATIVE
BUN BLDV-MCNC: 12 MG/DL (ref 6–23)
CALCIUM SERPL-MCNC: 9.6 MG/DL (ref 8.3–10.6)
CHLORIDE BLD-SCNC: 105 MMOL/L (ref 99–110)
CLARITY: CLEAR
CO2: 23 MMOL/L (ref 21–32)
COLOR: YELLOW
CREAT SERPL-MCNC: 0.8 MG/DL (ref 0.6–1.1)
DIFFERENTIAL TYPE: ABNORMAL
EKG ATRIAL RATE: 63 BPM
EKG DIAGNOSIS: NORMAL
EKG P AXIS: 52 DEGREES
EKG P-R INTERVAL: 186 MS
EKG Q-T INTERVAL: 422 MS
EKG QRS DURATION: 90 MS
EKG QTC CALCULATION (BAZETT): 431 MS
EKG R AXIS: -19 DEGREES
EKG T AXIS: 34 DEGREES
EKG VENTRICULAR RATE: 63 BPM
EOSINOPHILS ABSOLUTE: 0.2 K/CU MM
EOSINOPHILS RELATIVE PERCENT: 5 % (ref 0–3)
GFR AFRICAN AMERICAN: >60 ML/MIN/1.73M2
GFR NON-AFRICAN AMERICAN: >60 ML/MIN/1.73M2
GLUCOSE BLD-MCNC: 97 MG/DL (ref 70–99)
GLUCOSE, URINE: NEGATIVE MG/DL
HCT VFR BLD CALC: 33.8 % (ref 37–47)
HEMOGLOBIN: 10.8 GM/DL (ref 12.5–16)
IMMATURE NEUTROPHIL %: 0.2 % (ref 0–0.43)
KETONES, URINE: NEGATIVE MG/DL
LEUKOCYTE ESTERASE, URINE: ABNORMAL
LIPASE: 14 IU/L (ref 13–60)
LYMPHOCYTES ABSOLUTE: 1.5 K/CU MM
LYMPHOCYTES RELATIVE PERCENT: 36.3 % (ref 24–44)
MCH RBC QN AUTO: 27.3 PG (ref 27–31)
MCHC RBC AUTO-ENTMCNC: 32 % (ref 32–36)
MCV RBC AUTO: 85.4 FL (ref 78–100)
MONOCYTES ABSOLUTE: 0.3 K/CU MM
MONOCYTES RELATIVE PERCENT: 7 % (ref 0–4)
MUCUS: ABNORMAL HPF
NITRITE URINE, QUANTITATIVE: NEGATIVE
NUCLEATED RBC %: 0 %
PDW BLD-RTO: 12.8 % (ref 11.7–14.9)
PH, URINE: 6.5 (ref 5–8)
PLATELET # BLD: 192 K/CU MM (ref 140–440)
PMV BLD AUTO: 9.7 FL (ref 7.5–11.1)
POTASSIUM SERPL-SCNC: 4.2 MMOL/L (ref 3.5–5.1)
PROTEIN UA: NEGATIVE MG/DL
RBC # BLD: 3.96 M/CU MM (ref 4.2–5.4)
RBC URINE: <1 /HPF (ref 0–6)
SEGMENTED NEUTROPHILS ABSOLUTE COUNT: 2.1 K/CU MM
SEGMENTED NEUTROPHILS RELATIVE PERCENT: 50.5 % (ref 36–66)
SODIUM BLD-SCNC: 139 MMOL/L (ref 135–145)
SPECIFIC GRAVITY UA: 1.02 (ref 1–1.03)
SQUAMOUS EPITHELIAL: 5 /HPF
TOTAL IMMATURE NEUTOROPHIL: 0.01 K/CU MM
TOTAL NUCLEATED RBC: 0 K/CU MM
TOTAL PROTEIN: 6.4 GM/DL (ref 6.4–8.2)
TRANSITIONAL EPITHELIAL: <1 /HPF
TRICHOMONAS: ABNORMAL /HPF
TROPONIN T: <0.01 NG/ML
UROBILINOGEN, URINE: 0.2 MG/DL (ref 0.2–1)
WBC # BLD: 4.2 K/CU MM (ref 4–10.5)
WBC UA: 4 /HPF (ref 0–5)

## 2022-03-17 PROCEDURE — 93010 ELECTROCARDIOGRAM REPORT: CPT | Performed by: INTERNAL MEDICINE

## 2022-03-17 PROCEDURE — 73630 X-RAY EXAM OF FOOT: CPT

## 2022-03-17 PROCEDURE — 81001 URINALYSIS AUTO W/SCOPE: CPT

## 2022-03-17 PROCEDURE — 6360000002 HC RX W HCPCS: Performed by: PHYSICIAN ASSISTANT

## 2022-03-17 PROCEDURE — 93005 ELECTROCARDIOGRAM TRACING: CPT | Performed by: PHYSICIAN ASSISTANT

## 2022-03-17 PROCEDURE — 71045 X-RAY EXAM CHEST 1 VIEW: CPT

## 2022-03-17 PROCEDURE — 80053 COMPREHEN METABOLIC PANEL: CPT

## 2022-03-17 PROCEDURE — 96376 TX/PRO/DX INJ SAME DRUG ADON: CPT

## 2022-03-17 PROCEDURE — 85025 COMPLETE CBC W/AUTO DIFF WBC: CPT

## 2022-03-17 PROCEDURE — 83690 ASSAY OF LIPASE: CPT

## 2022-03-17 PROCEDURE — 96372 THER/PROPH/DIAG INJ SC/IM: CPT

## 2022-03-17 PROCEDURE — 84484 ASSAY OF TROPONIN QUANT: CPT

## 2022-03-17 PROCEDURE — 96375 TX/PRO/DX INJ NEW DRUG ADDON: CPT

## 2022-03-17 PROCEDURE — 99284 EMERGENCY DEPT VISIT MOD MDM: CPT

## 2022-03-17 PROCEDURE — 96374 THER/PROPH/DIAG INJ IV PUSH: CPT

## 2022-03-17 PROCEDURE — 2580000003 HC RX 258: Performed by: PHYSICIAN ASSISTANT

## 2022-03-17 RX ORDER — SCOLOPAMINE TRANSDERMAL SYSTEM 1 MG/1
1 PATCH, EXTENDED RELEASE TRANSDERMAL
Status: DISCONTINUED | OUTPATIENT
Start: 2022-03-17 | End: 2022-03-18 | Stop reason: HOSPADM

## 2022-03-17 RX ORDER — PROMETHAZINE HYDROCHLORIDE 25 MG/ML
25 INJECTION, SOLUTION INTRAMUSCULAR; INTRAVENOUS ONCE
Status: COMPLETED | OUTPATIENT
Start: 2022-03-17 | End: 2022-03-17

## 2022-03-17 RX ORDER — FENTANYL CITRATE 50 UG/ML
50 INJECTION, SOLUTION INTRAMUSCULAR; INTRAVENOUS ONCE
Status: COMPLETED | OUTPATIENT
Start: 2022-03-17 | End: 2022-03-17

## 2022-03-17 RX ORDER — MORPHINE SULFATE 2 MG/ML
2 INJECTION, SOLUTION INTRAMUSCULAR; INTRAVENOUS EVERY 30 MIN PRN
Status: DISCONTINUED | OUTPATIENT
Start: 2022-03-17 | End: 2022-03-18 | Stop reason: HOSPADM

## 2022-03-17 RX ORDER — OXYCODONE HYDROCHLORIDE AND ACETAMINOPHEN 5; 325 MG/1; MG/1
1 TABLET ORAL ONCE
Status: DISCONTINUED | OUTPATIENT
Start: 2022-03-17 | End: 2022-03-18 | Stop reason: HOSPADM

## 2022-03-17 RX ORDER — ONDANSETRON 2 MG/ML
4 INJECTION INTRAMUSCULAR; INTRAVENOUS ONCE
Status: COMPLETED | OUTPATIENT
Start: 2022-03-17 | End: 2022-03-17

## 2022-03-17 RX ORDER — 0.9 % SODIUM CHLORIDE 0.9 %
1000 INTRAVENOUS SOLUTION INTRAVENOUS ONCE
Status: DISCONTINUED | OUTPATIENT
Start: 2022-03-17 | End: 2022-03-18 | Stop reason: HOSPADM

## 2022-03-17 RX ORDER — 0.9 % SODIUM CHLORIDE 0.9 %
1000 INTRAVENOUS SOLUTION INTRAVENOUS ONCE
Status: COMPLETED | OUTPATIENT
Start: 2022-03-17 | End: 2022-03-17

## 2022-03-17 RX ORDER — PROMETHAZINE HYDROCHLORIDE 25 MG/1
25 TABLET ORAL EVERY 6 HOURS PRN
Qty: 10 TABLET | Refills: 0 | Status: SHIPPED | OUTPATIENT
Start: 2022-03-17 | End: 2022-03-24

## 2022-03-17 RX ORDER — HEPARIN SODIUM (PORCINE) LOCK FLUSH IV SOLN 100 UNIT/ML 100 UNIT/ML
100 SOLUTION INTRAVENOUS ONCE
Status: DISCONTINUED | OUTPATIENT
Start: 2022-03-17 | End: 2022-03-17

## 2022-03-17 RX ORDER — ONDANSETRON 2 MG/ML
8 INJECTION INTRAMUSCULAR; INTRAVENOUS ONCE
Status: DISCONTINUED | OUTPATIENT
Start: 2022-03-17 | End: 2022-03-17

## 2022-03-17 RX ORDER — HEPARIN SODIUM (PORCINE) LOCK FLUSH IV SOLN 100 UNIT/ML 100 UNIT/ML
500 SOLUTION INTRAVENOUS ONCE
Status: COMPLETED | OUTPATIENT
Start: 2022-03-17 | End: 2022-03-17

## 2022-03-17 RX ORDER — ONDANSETRON 2 MG/ML
4 INJECTION INTRAMUSCULAR; INTRAVENOUS EVERY 30 MIN PRN
Status: DISCONTINUED | OUTPATIENT
Start: 2022-03-17 | End: 2022-03-17

## 2022-03-17 RX ADMIN — MORPHINE SULFATE 2 MG: 2 INJECTION, SOLUTION INTRAMUSCULAR; INTRAVENOUS at 21:01

## 2022-03-17 RX ADMIN — SODIUM CHLORIDE 1000 ML: 9 INJECTION, SOLUTION INTRAVENOUS at 20:09

## 2022-03-17 RX ADMIN — PROMETHAZINE HYDROCHLORIDE 25 MG: 25 INJECTION INTRAMUSCULAR; INTRAVENOUS at 15:22

## 2022-03-17 RX ADMIN — PROMETHAZINE HYDROCHLORIDE 25 MG: 25 INJECTION INTRAMUSCULAR; INTRAVENOUS at 21:33

## 2022-03-17 RX ADMIN — ONDANSETRON 4 MG: 2 INJECTION INTRAMUSCULAR; INTRAVENOUS at 19:00

## 2022-03-17 RX ADMIN — ONDANSETRON 4 MG: 2 INJECTION INTRAMUSCULAR; INTRAVENOUS at 16:56

## 2022-03-17 RX ADMIN — HEPARIN 500 UNITS: 100 SYRINGE at 21:59

## 2022-03-17 RX ADMIN — FENTANYL CITRATE 50 MCG: 50 INJECTION, SOLUTION INTRAMUSCULAR; INTRAVENOUS at 16:55

## 2022-03-17 ASSESSMENT — PAIN DESCRIPTION - PAIN TYPE
TYPE: ACUTE PAIN
TYPE: CHRONIC PAIN

## 2022-03-17 ASSESSMENT — PAIN DESCRIPTION - DESCRIPTORS
DESCRIPTORS: ACHING
DESCRIPTORS: ACHING

## 2022-03-17 ASSESSMENT — PAIN SCALES - GENERAL
PAINLEVEL_OUTOF10: 6
PAINLEVEL_OUTOF10: 6
PAINLEVEL_OUTOF10: 8
PAINLEVEL_OUTOF10: 6
PAINLEVEL_OUTOF10: 7

## 2022-03-17 ASSESSMENT — PAIN DESCRIPTION - LOCATION
LOCATION: ABDOMEN

## 2022-03-17 ASSESSMENT — PAIN - FUNCTIONAL ASSESSMENT
PAIN_FUNCTIONAL_ASSESSMENT: 0-10
PAIN_FUNCTIONAL_ASSESSMENT: 0-10

## 2022-03-17 NOTE — ED PROVIDER NOTES
12 lead EKG per my interpretation:  Normal Sinus Rhythm 63  Axis is   Normal  QTc is  431  There is no specific T wave changes appreciated. There is no specific ST wave changes appreciated.     Prior EKG to compare with was not available         Candance Center, DO  03/17/22 [id number]

## 2022-03-17 NOTE — ED PROVIDER NOTES
Emergency Department Encounter  Location: [address] [place] [address] EMERGENCY DEPARTMENT    Patient: Ashia Upton  MRN: [id number]  : 1970  Date of evaluation: 3/17/2022  ED Provider: Sanam Frost PA-C    1800  Ashia Upton was checked out to me by OSMANI lacy. Please see his/her initial documentation for details of the patient's initial ED presentation, physical exam and completed studies. In brief, Ashia Upton is a 46 y.o. female that presented to the emergency department for epigastric abdominal pain, she does have hx of chronic pancreatitis. She states she has not been able to take any of her medications. It is radiating into the chest. She has been vomitting actively in this dept.      I have reviewed and interpreted all of the currently available lab results and diagnostics from this visit:  Results for orders placed or performed during the hospital encounter of 22   CBC with Auto Differential   Result Value Ref Range    WBC 4.2 4.0 - 10.5 K/CU MM    RBC 3.96 (L) 4.2 - 5.4 M/CU MM    Hemoglobin 10.8 (L) 12.5 - 16.0 GM/DL    Hematocrit 33.8 (L) 37 - 47 %    MCV 85.4 78 - 100 FL    MCH 27.3 27 - 31 PG    MCHC 32.0 32.0 - 36.0 %    RDW 12.8 11.7 - 14.9 %    Platelets 560 608 - 625 K/CU MM    MPV 9.7 7.5 - 11.1 FL    Differential Type AUTOMATED DIFFERENTIAL     Segs Relative 50.5 36 - 66 %    Lymphocytes % 36.3 24 - 44 %    Monocytes % 7.0 (H) 0 - 4 %    Eosinophils % 5.0 (H) 0 - 3 %    Basophils % 1.0 0 - 1 %    Segs Absolute 2.1 K/CU MM    Lymphocytes Absolute 1.5 K/CU MM    Monocytes Absolute 0.3 K/CU MM    Eosinophils Absolute 0.2 K/CU MM    Basophils Absolute 0.0 K/CU MM    Nucleated RBC % 0.0 %    Total Nucleated RBC 0.0 K/CU MM    Total Immature Neutrophil 0.01 K/CU MM    Immature Neutrophil % 0.2 0 - 0.43 %   Comprehensive Metabolic Panel   Result Value Ref Range    Sodium 139 135 - 145 MMOL/L    Potassium 4.2 3.5 - 5.1 MMOL/L    Chloride 105 99 - 110 mMol/L    CO2 23 21 - 32 MMOL/L    BUN 12 6 - 23 MG/DL    CREATININE 0.8 0.6 - 1.1 MG/DL    Glucose 97 70 - 99 MG/DL    Calcium 9.6 8.3 - 10.6 MG/DL    Albumin 3.8 3.4 - 5.0 GM/DL    Total Protein 6.4 6.4 - 8.2 GM/DL    Total Bilirubin 0.2 0.0 - 1.0 MG/DL    ALT 11 10 - 40 U/L    AST 14 (L) 15 - 37 IU/L    Alkaline Phosphatase 121 40 - 128 IU/L    GFR Non-African American >60 >60 mL/min/1.73m2    GFR African American >60 >60 mL/min/1.73m2    Anion Gap 11 4 - 16   Lipase   Result Value Ref Range    Lipase 14 13 - 60 IU/L   Urinalysis   Result Value Ref Range    Color, UA YELLOW YELLOW    Clarity, UA CLEAR CLEAR    Glucose, Urine NEGATIVE NEGATIVE MG/DL    Bilirubin Urine NEGATIVE NEGATIVE MG/DL    Ketones, Urine NEGATIVE NEGATIVE MG/DL    Specific Gravity, UA 1.020 1.001 - 1.035    Blood, Urine NEGATIVE NEGATIVE    pH, Urine 6.5 5.0 - 8.0    Protein, UA NEGATIVE NEGATIVE MG/DL    Urobilinogen, Urine 0.2 0.2 - 1.0 MG/DL    Nitrite Urine, Quantitative NEGATIVE NEGATIVE    Leukocyte Esterase, Urine TRACE (A) NEGATIVE    RBC, UA <1 0 - 6 /HPF    WBC, UA 4 0 - 5 /HPF    Bacteria, UA NEGATIVE NEGATIVE /HPF    Squam Epithel, UA 5 /HPF    Trans Epithel, UA <1 /HPF    Mucus, UA RARE (A) NEGATIVE HPF    Trichomonas, UA NONE SEEN NONE SEEN /HPF   Troponin   Result Value Ref Range    Troponin T <0.010 <0.01 NG/ML   EKG 12 Lead   Result Value Ref Range    Ventricular Rate 63 BPM    Atrial Rate 63 BPM    P-R Interval 186 ms    QRS Duration 90 ms    Q-T Interval 422 ms    QTc Calculation (Bazett) 431 ms    P Axis 52 degrees    R Axis -19 degrees    T Axis 34 degrees    Diagnosis       Normal sinus rhythm  Possible Left atrial enlargement  Borderline ECG  When compared with ECG of 12-SEP-2021 18:57,  No significant change was found  Confirmed by OPAL Reynolds ([id number]) on 3/17/2022 7:37:53 PM       XR FOOT LEFT (MIN 3 VIEWS)    Result Date: 3/17/2022  EXAMINATION: THREE XRAY VIEWS OF THE LEFT FOOT 3/17/2022 4:58 pm COMPARISON: May 1, 2021 HISTORY: ORDERING SYSTEM PROVIDED HISTORY: pain, swelling TECHNOLOGIST PROVIDED HISTORY: Reason for exam:->pain, swelling Reason for Exam: pain, swelling Additional signs and symptoms: none Relevant Medical/Surgical History: foot sx, arthritis FINDINGS: There is no acute fracture or dislocation of the left foot. There is complete osseous fusion at left 1st MTP joint. There is chronic deformity at the left 3rd MTP joint, stable. There is chronic deformity at the distal aspect of left 3rd middle phalanx, stable. The Lisfranc alignment is intact. There is a plantar bone spur. There are moderate degenerative changes at the interphalangeal joints. There is mild soft tissue swelling. No radiopaque foreign body. No acute fracture or dislocation of the left foot. Chronic changes, stable. Mild soft tissue swelling. XR CHEST PORTABLE    Result Date: 3/17/2022  EXAMINATION: ONE XRAY VIEW OF THE CHEST 3/17/2022 2:03 pm COMPARISON: 09/12/2021. HISTORY: ORDERING SYSTEM PROVIDED HISTORY: CP TECHNOLOGIST PROVIDED HISTORY: Reason for exam:->CP Reason for Exam: CHEST PAIN FINDINGS: Similar-appearing left chest port. Similar appearing mild prominence the cardiac silhouette. No focal consolidation or overt pulmonary edema. Costophrenic angles are sharp. No evidence of pneumothorax. No acute osseous abnormalities. No radiographic evidence of an acute cardiopulmonary process. Final ED Course and MDM: In brief, Susanne Li is a 46 y.o. female whose care was signed out to me by the outgoing provider. In brief, chronic pancreatitis. Lipase within normal limits CBC metabolic panel within normal limits belly exam benign on initial repeat examination patient is provided with analgesics, antiemetics, IV fluids. Feels significantly better is now tolerating p.o.     I estimate there is LOW risk for (including but not limited to) ACUTE APPENDICITIS, BOWEL OBSTRUCTION, ACUTE CHOLECYSTITIS, RUPTURED DIVERTICULITIS, INCARCERATED or STRANGULATED HERNIA, HEMMORHAGIC PANCREATITIS, or PERFORATED BOWEL/ULCER, thus I consider the discharge disposition reasonable. Andre KEKE Pope (or their surrogate) and I have discussed the diagnosis and risks, and we agree with discharging home with close follow-up. We also discussed returning to the Emergency Department immediately if new or worsening symptoms occur. We have discussed the symptoms which are most concerning that necessitate immediate return.       ED Medication Orders (From admission, onward)    Start Ordered     Status Ordering Provider    03/17/22 2100 03/17/22 2053  promethazine (PHENERGAN) injection 25 mg  ONCE         Acknowledged ERIC IRENE    03/17/22 2000 03/17/22 1833  scopolamine (TRANSDERM-SCOP) transdermal patch 1 patch  EVERY 72 HOURS         Last MAR action: Not Given - by Izabela Craven on 03/17/22 at [facility], K[place]HAWN KHOURY    03/17/22 2000 03/17/22 1945  0.9 % sodium chloride bolus  ONCE         Acknowledged ERIC IRENE    03/17/22 2000 03/17/22 1945  0.9 % sodium chloride bolus  ONCE         Last MAR action: New Bag - [facility] Av[facility] Spe[facility]    03/17/22 1945 03/17/22 1945  morphine (PF) injection 2 mg  EVERY 30 MIN PRN         Last MAR action: Given - by Avtar Spearing on [facility]17[facility]    03/17/22 1845 03/17/22 1833  ondansetron (ZOFRAN) injection 4 mg  ONCE         Last MAR action: Given - by Izabela Craven on 03/17/22 at [facility], KESHAWN KHOURY    03/17/22 1645 03/17/22 1630  fentaNYL (SUBLIMAZE) injection 50 mcg  ONCE         Last MAR action: Given - by Izabela Craven on 03/17/22 at [facility], KESHAWN KHOURY    03/17/22 1400 03/17/22 1342  promethazine (PHENERGAN) injection 25 mg  ONCE         Last MAR action: Given - by Izabela Craven on 03/17/22 at [address]    03/17/22 1345 03/17/22 1349 oxyCODONE-acetaminophen (PERCOCET) 5-325 MG per tablet 1 tablet  ONCE         Last MAR action: Not Given - by Johanna Berg on 03/17/22 at [address] [place] G[address]O          Final Impression      1. Abdominal pain, epigastric    2.  Non-intractable vomiting with nausea, unspecified vomiting type        DISPOSITION Decision To Discharge 03/17/2022 09:27:41 PM     (Please note that portions of this note may have been completed with a voice recognition program. Efforts were made to edit the dictations but occasionally words are mis-transcribed.)    Karen Erazo [address]  03/17/22 1831

## 2022-03-17 NOTE — ED NOTES
Department of Anesthesiology  Postprocedure Note    Patient: Nancy Mckinney  MRN: [id number]  YOB: 1944  Date of evaluation: 6/11/2021  Time:  1:52 PM     Procedure Summary     Date: 06/11/21 Room / Location: [address]    Anesthesia Start: 3140 Anesthesia Stop: 1310    Procedure: LAPAROSCOPIC CHOLECYSTECTOMY WITH CHOLANGIOGRAM (N/A Abdomen) Diagnosis: (CHOLECYSTITIS)    Surgeons: Marlene Mejia MD Responsible Provider: Robert Gallegos MD    Anesthesia Type: general ASA Status: 3          Anesthesia Type: general    Clemente Phase I: Clemente Score: 8    Clemente Phase II:      Last vitals: Reviewed and per EMR flowsheets.        Anesthesia Post Evaluation    Level of consciousness: awake and alert  Airway patency: patent  Nausea & Vomiting: no nausea and no vomiting  Complications: no  Cardiovascular status: hemodynamically stable  Respiratory status: acceptable  Hydration status: stable Patient vomited again approximately 300 ml.      Jadon Campbell RN  03/17/22 Nicky Alston

## 2022-03-17 NOTE — ED PROVIDER NOTES
Patient Identification  Asad Blancas is a 46 y.o. female    Chief Complaint  Abdominal Pain (hx of pancreatitis)      HPI  (History provided by patient)  This is a 46 y.o. female who was brought in by self for chief complaint of abdominal pain. Patient reports it has been ongoing for the last week. Currently located epigastric region with radiation to the right flank. She does have a history of Jet-en-Y bypass and cholecystectomy and chronic pancreatitis. States that she has not been able to tolerate her home pain medicine secondary to recurrent vomiting. She denies any fevers. No blood in vomit or stool. No changes in bowel movements. States he has occasional pain radiating up into her chest from the epigastric pain. REVIEW OF SYSTEMS    Constitutional:  Denies fever, chills  HENT:  Denies sore throat or ear pain   Eyes: Denies vision changes, eye pain  Cardiovascular:  Denies syncope.  + CP  Respiratory:  Denies shortness of breath, cough   GI:  + abdominal pain, nausea, vomiting  :  Denies dysuria, discharge  Musculoskeletal:  Denies back pain, joint pain  Skin:  Denies rash, pruritis  Neurologic:  Denies headache, focal weakness, or sensory changes     See HPI and nursing notes for additional information     I have reviewed the following nursing documentation:  Allergies:    Allergies   Allergen Reactions    Aspirin Palpitations     \"My Heart Rate Elevates\"    Compazine [Prochlorperazine] Rash    Shellfish-Derived Products Swelling    Toradol [Ketorolac Tromethamine] Rash    Haldol [Haloperidol]      Shaking      Reglan [Metoclopramide] Itching       Past medical history:  has a past medical history of Acid reflux, Anemia, Anxiety, Arthritis, Bleeding ulcer (2014), Bronchitis (Last Episode 2014), Chronic back pain, Chronic pain, COPD (chronic obstructive pulmonary disease) ([facility] [address] Depression, Fibromyalgia (Dx 2013), GERD (gastroesophageal reflux disease), H/O echocardiogram (2015), H/O echocardiogram (2018), Hiatal hernia, History of blood transfusion (2015 And ), History of sleeve gastrectomy, [place] (hard of hearing), cardiac catheterization (10/24/2010), transesophageal echocardiography (PILO) for monitoring (2010), OTHER MEDICAL, OTHER MEDICAL, Hypertension, Kidney cysts, Lupus ([facility] [address] (Dx ), MDRO (multiple drug resistant organisms) resistance, Morbid obesity ([address] MRSA bacteremia, Nausea, Osteomyelitis of fifth toe of left foot ([address] Pain management, Pancreatitis chronic (Dx ), Pneumonia (Dx -15), Seizures ([address] Shortness of breath on exertion, Shortness of breath on exertion, Sleep apnea, Staph infection (Dx -15), Staph infection (Dx -15), Teeth missing, Thyroid disease, and Wears glasses. Past surgical history:  has a past surgical history that includes Lung removal, partial (Left, );  section (1991); Foot surgery (Left, Last Done In ); Dental surgery; Cholecystectomy, laparoscopic (4804'K); other surgical history (1998); other surgical history (Last Done 7-15-16); Tonsillectomy and adenoidectomy (); Appendectomy (1998); Upper gastrointestinal endoscopy (2018); Lap Band (~); Hysterectomy (10/1997); Endoscopy, colon, diagnostic (Last Done In ); Colonoscopy (Last Done In ); Cardiac catheterization (10/24/2010); Sleeve Gastrectomy (N/A, 2019); hiatal hernia repair (N/A, 2019); Upper gastrointestinal endoscopy (N/A, 2019); Foot Debridement (Left, 2019); Upper gastrointestinal endoscopy (N/A, 2019); Catheter Removal (N/A, 2019); Upper gastrointestinal endoscopy (N/A, 2019); INSERTION / REMOVAL / REPLACEMENT VENOUS ACCESS CATHETER (N/A, 8/15/2019); Upper gastrointestinal endoscopy (N/A, 10/14/2019); [facility] Surgery (N/A, 1/15/2020); [facility] Surgery (N/A, 2020); Upper gastrointestinal endoscopy (N/A, 2020); Leg biopsy excision (Left, 3/8/2021);  Upper 1 patch onto the skin every 72 hours 8/18/21 8/18/22  Lindsey Pedersen MD   scopolamine (TRANSDERM-SCOP) transdermal patch APPLY 1 PATCH TRANSDERMALLY TO THE SKIN BEHIND THE EAR ONCE EVERY 3 DAYS AS NEEDED 7/4/21   Lore Patch CYRIL Kiser   Melatonin 10 MG TABS Take 10-20 mg by mouth nightly as needed    Historical Provider, MD   Cyanocobalamin (VITAMIN B 12 PO) Take 1 tablet by mouth daily    Historical Provider, MD   meclizine (ANTIVERT) 25 MG tablet Take 25 mg by mouth 3 times daily as needed for Dizziness    Historical Provider, MD   levETIRAcetam (KEPPRA) 1000 MG tablet Take 1 tablet by mouth 2 times daily 5/13/21   Rolando Alfaro MD   betamethasone valerate (VALISONE) 0.1 % ointment APPLY OINTMENT TO AFFECTED AREA TWICE DAILY TO HANDS AND ELBOWS FOR 21 DAYS 3/26/21   Historical Provider, MD   potassium gluconate 550 mg tablet Take 1 tablet by mouth daily 3/25/21   Historical Provider, MD   tiZANidine (ZANAFLEX) 4 MG tablet Take 4 mg by mouth every 8 hours as needed  3/3/21   Historical Provider, MD   ferrous sulfate (IRON 325) 325 (65 Fe) MG tablet Take 1 tablet by mouth daily (with breakfast) 10/23/20   Lindsey Pedersen MD   Cholecalciferol (VITAMIN D3) 5000 units TABS Take 1 tablet by mouth daily    Historical Provider, MD   Multiple Vitamin (MVI, BARIATRIC ADVANTAGE MULTI-FORMULA, CHEW TAB) Take 1 tablet by mouth daily 2/22/19   Lindsey Pedersen MD   Calcium Citrate-Vitamin D (CALCIUM + VIT D, BARIATRIC ADVANTAGE, CHEWABLE TABLET) Take 1 tablet by mouth 2 times daily 2/22/19   Lindsey Pedersen MD   gabapentin (NEURONTIN) 800 MG tablet Take 800 mg by mouth 3 times daily    Historical Provider, MD       Social history:  reports that she has never smoked. She has never used smokeless tobacco. She reports that she does not drink alcohol and does not use drugs.     Family history:    Family History   Problem Relation Age of Onset    Diabetes Mother         \"Borderline Diabetes\"    High Blood Pressure Mother  Obesity Mother     Arthritis Mother     Heart Disease Mother     High Cholesterol Mother     Vision Loss Mother     Diabetes Father     High Blood Pressure Father     Asthma Father     Cancer Father         prostate cancer    High Blood Pressure Brother     Asthma Son     Vision Loss Son     Lupus Daughter     Other Daughter         \"Alot Of Female Problems\"         Exam  /63   Pulse 55   Temp 98.6 °F (37 °C) (Oral)   Resp 20   SpO2 100%   Nursing note and vitals reviewed. Constitutional: Well developed, well nourished. No acute distress. HENT:      Head: Normocephalic and atraumatic. Ears: External ears normal.      Nose: Nose normal.     Mouth: Membrane mucosa moist and pink. No posterior oropharynx erythema or tonsillar edema  Eyes: Anicteric sclera. No discharge, PERRL  Neck: Supple. Trachea midline. Cardiovascular: RRR, no murmurs, rubs, or gallops, radial pulses 2+ bilaterally. Pulmonary/Chest: Effort normal. No respiratory distress. CTAB. No stridor. No wheezes. No rales. Abdominal: Soft. Mild epigastric and RUQ TTP noted. No distension. No guarding, rebound tenderness, or evidence of ascites. : No CVA tenderness. Musculoskeletal: Moves all extremities. No gross deformity. Neurological: Alert and oriented to person, place, and time. Normal muscle tone. Skin: Warm and dry. No rash. Psychiatric: Normal mood and affect. Behavior is normal.      EKG   Please see Dr. Octavio Lorenzo note for EKG read. Radiographs (if obtained):  [] The following radiograph was interpreted by myself in the absence of a radiologist:   [x] Radiologist's Report Reviewed:  XR FOOT LEFT (MIN 3 VIEWS)   Final Result   No acute fracture or dislocation of the left foot. Chronic changes, stable. Mild soft tissue swelling. XR CHEST PORTABLE   Final Result   No radiographic evidence of an acute cardiopulmonary process.                 Labs  Results for orders placed or performed during the hospital encounter of 03/17/22   CBC with Auto Differential   Result Value Ref Range    WBC 4.2 4.0 - 10.5 K/CU MM    RBC 3.96 (L) 4.2 - 5.4 M/CU MM    Hemoglobin 10.8 (L) 12.5 - 16.0 GM/DL    Hematocrit 33.8 (L) 37 - 47 %    MCV 85.4 78 - 100 FL    MCH 27.3 27 - 31 PG    MCHC 32.0 32.0 - 36.0 %    RDW 12.8 11.7 - 14.9 %    Platelets 691 333 - 543 K/CU MM    MPV 9.7 7.5 - 11.1 FL    Differential Type AUTOMATED DIFFERENTIAL     Segs Relative 50.5 36 - 66 %    Lymphocytes % 36.3 24 - 44 %    Monocytes % 7.0 (H) 0 - 4 %    Eosinophils % 5.0 (H) 0 - 3 %    Basophils % 1.0 0 - 1 %    Segs Absolute 2.1 K/CU MM    Lymphocytes Absolute 1.5 K/CU MM    Monocytes Absolute 0.3 K/CU MM    Eosinophils Absolute 0.2 K/CU MM    Basophils Absolute 0.0 K/CU MM    Nucleated RBC % 0.0 %    Total Nucleated RBC 0.0 K/CU MM    Total Immature Neutrophil 0.01 K/CU MM    Immature Neutrophil % 0.2 0 - 0.43 %   Comprehensive Metabolic Panel   Result Value Ref Range    Sodium 139 135 - 145 MMOL/L    Potassium 4.2 3.5 - 5.1 MMOL/L    Chloride 105 99 - 110 mMol/L    CO2 23 21 - 32 MMOL/L    BUN 12 6 - 23 MG/DL    CREATININE 0.8 0.6 - 1.1 MG/DL    Glucose 97 70 - 99 MG/DL    Calcium 9.6 8.3 - 10.6 MG/DL    Albumin 3.8 3.4 - 5.0 GM/DL    Total Protein 6.4 6.4 - 8.2 GM/DL    Total Bilirubin 0.2 0.0 - 1.0 MG/DL    ALT 11 10 - 40 U/L    AST 14 (L) 15 - 37 IU/L    Alkaline Phosphatase 121 40 - 128 IU/L    GFR Non-African American >60 >60 mL/min/1.73m2    GFR African American >60 >60 mL/min/1.73m2    Anion Gap 11 4 - 16   Lipase   Result Value Ref Range    Lipase 14 13 - 60 IU/L   Urinalysis   Result Value Ref Range    Color, UA YELLOW YELLOW    Clarity, UA CLEAR CLEAR    Glucose, Urine NEGATIVE NEGATIVE MG/DL    Bilirubin Urine NEGATIVE NEGATIVE MG/DL    Ketones, Urine NEGATIVE NEGATIVE MG/DL    Specific Gravity, UA 1.020 1.001 - 1.035    Blood, Urine NEGATIVE NEGATIVE    pH, Urine 6.5 5.0 - 8.0    Protein, UA NEGATIVE NEGATIVE MG/DL    Urobilinogen, Urine 0.2 0.2 - 1.0 MG/DL    Nitrite Urine, Quantitative NEGATIVE NEGATIVE    Leukocyte Esterase, Urine TRACE (A) NEGATIVE    RBC, UA <1 0 - 6 /HPF    WBC, UA 4 0 - 5 /HPF    Bacteria, UA NEGATIVE NEGATIVE /HPF    Squam Epithel, UA 5 /HPF    Trans Epithel, UA <1 /HPF    Mucus, UA RARE (A) NEGATIVE HPF    Trichomonas, UA NONE SEEN NONE SEEN /HPF   Troponin   Result Value Ref Range    Troponin T <0.010 <0.01 NG/ML   EKG 12 Lead   Result Value Ref Range    Ventricular Rate 63 BPM    Atrial Rate 63 BPM    P-R Interval 186 ms    QRS Duration 90 ms    Q-T Interval 422 ms    QTc Calculation (Bazett) 431 ms    P Axis 52 degrees    R Axis -19 degrees    T Axis 34 degrees    Diagnosis       Normal sinus rhythm  Possible Left atrial enlargement  Borderline ECG  When compared with ECG of 12-SEP-2021 18:57,  No significant change was found           MDM  Patient presents for abdominal pain. She also endorses some shortness of breath and chest discomfort. Has a history of chronic pancreatitis. Has been vomiting up her chronic pain meds at home. Laboratory work-up here is unremarkable. Chest x-ray negative. EKG shows normal sinus rhythm. Patient given Zofran and still has emesis. Given Phenergan and fentanyl and continues to have emesis. I have ordered Zofran and scopolamine. Patient also raises concern for a possible infection in her left foot. On my exam she has a fluctuant area approximately 2 to 3 cm in size on the ball of the left foot with no overlying erythema or rash. She states that her podiatrist is planning to perform surgery to drain an abscess. I did speak with Dr. Seena Severance who reports low concern for abscess, states it has been there for months and is likely some kind of encapsulated fluid collection, he was able to view CT findings from recently regarding this. He is planning to perform I&D as an outpatient in the future.   Currently patient is pending p.o. tolerance, may require admission if this is unable to be achieved. 6:39 PM EDT I have signed out [address] Emergency Department care to [facility]. We discussed the history, physical exam, completed/pending test results (if obtained) and current treatment plan. Please refer to his/her chart for the patients further details, remaining Emergency Department course, final disposition and diagnosis. I have independently evaluated this patient. Final Impression  Chronic abdominal pain  Intractable NV  Foot fluid collection  SOB    Blood pressure 123/63, pulse 55, temperature 98.6 °F (37 °C), temperature source Oral, resp. rate 20, SpO2 100 %, not currently breastfeeding. Patient was given scripts for the following medications. I counseled patient how to take these medications. New Prescriptions    No medications on file       This chart was generated using the [address] [place] [address] [address]  dictation system. I created this record but it may contain dictation errors given the limitations of this technology.        Ruddy Holt PA-C  03/17/22 [address] CYRIL  03/17/22 1640

## 2022-03-24 ENCOUNTER — TELEPHONE (OUTPATIENT)
Dept: INTERNAL MEDICINE CLINIC | Age: 52
End: 2022-03-24

## 2022-03-24 NOTE — TELEPHONE ENCOUNTER
----- Message from [place] sent at 3/22/2022  1:01 PM EDT -----  Subject: Appointment Request    Reason for Call: Routine Pre-Op    QUESTIONS  Type of Appointment? Established Patient  Reason for appointment request? Available appointments did not meet   patient need  Additional Information for Provider? Pt called to schedule a Pre-Op appt. Pt is having surgery on her left foot. Pt already had blood work, chest   X-ray and a EKG done at the hospital last Thursday. Pt is needing to be   seen before April 15th. Please call pt back for sooner appt.   ---------------------------------------------------------------------------  --------------  CALL BACK INFO  What is the best way for the office to contact you? OK to leave message on   voicemail  Preferred Call Back Phone Number? [phone]  ---------------------------------------------------------------------------  --------------  SCRIPT ANSWERS  Relationship to Patient? Self  Do you have questions for your provider that need to be answered prior to   scheduling your pre-op appointment? No  Have you been diagnosed with, awaiting test results for, or told that you   are suspected of having COVID-19 (Coronavirus)? (If patient has tested   negative or was tested as a requirement for work, school, or travel and   not based on symptoms, answer no)? No  Within the past 10 days have you developed any of the following symptoms   (answer no if symptoms have been present longer than 10 days or began   more than 10 days ago)? Fever or Chills, Cough, Shortness of breath or   difficulty breathing, Loss of taste or smell, Sore throat, Nasal   congestion, Sneezing or runny nose, Fatigue or generalized body aches   (answer no if pain is specific to a body part e.g. back pain), Diarrhea,   Headache? No  Have you had close contact with someone with COVID-19 in the last 7 days? No  (Service Expert  click yes below to proceed with [employer] As Usual   Scheduling)? Yes

## 2022-03-24 NOTE — TELEPHONE ENCOUNTER
----- Message from [place] sent at 3/22/2022  1:01 PM EDT -----  Subject: Appointment Request    Reason for Call: Routine Pre-Op    QUESTIONS  Type of Appointment? Established Patient  Reason for appointment request? Available appointments did not meet   patient need  Additional Information for Provider? Pt called to schedule a Pre-Op appt. Pt is having surgery on her left foot. Pt already had blood work, chest   X-ray and a EKG done at the hospital last Thursday. Pt is needing to be   seen before April 15th. Please call pt back for sooner appt.   ---------------------------------------------------------------------------  --------------  CALL BACK INFO  What is the best way for the office to contact you? OK to leave message on   voicemail  Preferred Call Back Phone Number? [phone]  ---------------------------------------------------------------------------  --------------  SCRIPT ANSWERS  Relationship to Patient? Self  Do you have questions for your provider that need to be answered prior to   scheduling your pre-op appointment? No  Have you been diagnosed with, awaiting test results for, or told that you   are suspected of having COVID-19 (Coronavirus)? (If patient has tested   negative or was tested as a requirement for work, school, or travel and   not based on symptoms, answer no)? No  Within the past 10 days have you developed any of the following symptoms   (answer no if symptoms have been present longer than 10 days or began   more than 10 days ago)? Fever or Chills, Cough, Shortness of breath or   difficulty breathing, Loss of taste or smell, Sore throat, Nasal   congestion, Sneezing or runny nose, Fatigue or generalized body aches   (answer no if pain is specific to a body part e.g. back pain), Diarrhea,   Headache? No  Have you had close contact with someone with COVID-19 in the last 7 days? No  (Service Expert  click yes below to proceed with [employer] As Usual   Scheduling)? Yes

## 2022-03-25 NOTE — TELEPHONE ENCOUNTER
----- Message from Luisa Pretty sent at 3/24/2022  9:12 AM EDT -----  Subject: Appointment Request    Reason for Call: Routine Pre-Op    QUESTIONS  Type of Appointment? Established Patient  Reason for appointment request? Available appointments did not meet   patient need  Additional Information for Provider? Pt need a Pre-Op appt before surgery   15 Apr and she is in out of town and will be back on 31 March 2022. Any   time after Thursday afternoon thru Apr 11, 2022. Pt also want to know can   she go the clinic for the clearance if Dr. Yuly Andrews  ---------------------------------------------------------------------------  --------------  CallAround  What is the best way for the office to contact you? OK to leave message on   voicemail  Preferred Call Back Phone Number? [phone]  ---------------------------------------------------------------------------  --------------  SCRIPT ANSWERS  Relationship to Patient? Self  Do you have questions for your provider that need to be answered prior to   scheduling your pre-op appointment? No  Have you been diagnosed with, awaiting test results for, or told that you   are suspected of having COVID-19 (Coronavirus)? (If patient has tested   negative or was tested as a requirement for work, school, or travel and   not based on symptoms, answer no)? No  Within the past 10 days have you developed any of the following symptoms   (answer no if symptoms have been present longer than 10 days or began   more than 10 days ago)? Fever or Chills, Cough, Shortness of breath or   difficulty breathing, Loss of taste or smell, Sore throat, Nasal   congestion, Sneezing or runny nose, Fatigue or generalized body aches   (answer no if pain is specific to a body part e.g. back pain), Diarrhea,   Headache? No  Have you had close contact with someone with COVID-19 in the last 7 days? No  (Service Expert  click yes below to proceed with [facility] As Usual   Scheduling)?  Yes

## 2022-04-06 ENCOUNTER — OFFICE VISIT (OUTPATIENT)
Dept: INTERNAL MEDICINE CLINIC | Age: 52
End: 2022-04-06
Payer: COMMERCIAL

## 2022-04-06 VITALS
HEIGHT: 64 IN | WEIGHT: 238.9 LBS | SYSTOLIC BLOOD PRESSURE: 132 MMHG | OXYGEN SATURATION: 98 % | BODY MASS INDEX: 40.78 KG/M2 | DIASTOLIC BLOOD PRESSURE: 68 MMHG | HEART RATE: 65 BPM

## 2022-04-06 DIAGNOSIS — F32.A CHRONIC DEPRESSION: ICD-10-CM

## 2022-04-06 DIAGNOSIS — F41.9 ANXIETY: ICD-10-CM

## 2022-04-06 DIAGNOSIS — R11.0 CHRONIC NAUSEA: ICD-10-CM

## 2022-04-06 DIAGNOSIS — E55.9 VITAMIN D DEFICIENCY: ICD-10-CM

## 2022-04-06 DIAGNOSIS — J44.9 CHRONIC OBSTRUCTIVE PULMONARY DISEASE, UNSPECIFIED COPD TYPE (HCC): ICD-10-CM

## 2022-04-06 DIAGNOSIS — L02.612 ABSCESS OF LEFT FOOT: Primary | ICD-10-CM

## 2022-04-06 DIAGNOSIS — E66.01 MORBID OBESITY WITH BMI OF 40.0-44.9, ADULT (HCC): ICD-10-CM

## 2022-04-06 DIAGNOSIS — I10 PRIMARY HYPERTENSION: ICD-10-CM

## 2022-04-06 DIAGNOSIS — D50.0 IRON DEFICIENCY ANEMIA DUE TO CHRONIC BLOOD LOSS: ICD-10-CM

## 2022-04-06 DIAGNOSIS — K76.0 FATTY LIVER DISEASE, NONALCOHOLIC: ICD-10-CM

## 2022-04-06 DIAGNOSIS — K21.9 GASTROESOPHAGEAL REFLUX DISEASE WITHOUT ESOPHAGITIS: ICD-10-CM

## 2022-04-06 DIAGNOSIS — F44.5 PSEUDOSEIZURE: ICD-10-CM

## 2022-04-06 DIAGNOSIS — E03.4 HYPOTHYROIDISM DUE TO ACQUIRED ATROPHY OF THYROID: ICD-10-CM

## 2022-04-06 DIAGNOSIS — G89.4 CHRONIC PAIN SYNDROME: ICD-10-CM

## 2022-04-06 PROCEDURE — G8427 DOCREV CUR MEDS BY ELIG CLIN: HCPCS | Performed by: INTERNAL MEDICINE

## 2022-04-06 PROCEDURE — 3023F SPIROM DOC REV: CPT | Performed by: INTERNAL MEDICINE

## 2022-04-06 PROCEDURE — G8417 CALC BMI ABV UP PARAM F/U: HCPCS | Performed by: INTERNAL MEDICINE

## 2022-04-06 PROCEDURE — 99214 OFFICE O/P EST MOD 30 MIN: CPT | Performed by: INTERNAL MEDICINE

## 2022-04-06 PROCEDURE — 1036F TOBACCO NON-USER: CPT | Performed by: INTERNAL MEDICINE

## 2022-04-06 PROCEDURE — 3017F COLORECTAL CA SCREEN DOC REV: CPT | Performed by: INTERNAL MEDICINE

## 2022-04-06 RX ORDER — PANTOPRAZOLE SODIUM 40 MG/1
40 TABLET, DELAYED RELEASE ORAL 2 TIMES DAILY
Qty: 60 TABLET | Refills: 2 | Status: SHIPPED | OUTPATIENT
Start: 2022-04-06 | End: 2022-09-20

## 2022-04-06 RX ORDER — ATENOLOL 25 MG/1
TABLET ORAL
Qty: 30 TABLET | Refills: 2 | Status: ON HOLD | OUTPATIENT
Start: 2022-04-06 | End: 2022-05-02 | Stop reason: HOSPADM

## 2022-04-06 RX ORDER — PROMETHAZINE HYDROCHLORIDE 25 MG/1
25 TABLET ORAL EVERY 8 HOURS PRN
Qty: 30 TABLET | Refills: 0 | Status: ON HOLD | OUTPATIENT
Start: 2022-04-06 | End: 2022-05-02 | Stop reason: HOSPADM

## 2022-04-06 RX ORDER — HYDROXYZINE PAMOATE 50 MG/1
50 CAPSULE ORAL 2 TIMES DAILY
Qty: 30 CAPSULE | Refills: 2 | Status: SHIPPED | OUTPATIENT
Start: 2022-04-06 | End: 2022-06-23 | Stop reason: SDUPTHER

## 2022-04-06 NOTE — PROGRESS NOTES
Name: Oscar Ocampo  [id number]  Age: 46 y.o. YOB: 1970  Sex: female    CHIEF COMPLAINT:    Chief Complaint   Patient presents with    Other     Pre OP clearance    Other     other chronic conditions       HISTORY OF PRESENT ILLNESS:     This is a pleasant  46 y.o. female  is seen today for management of chronic medical problems and medications refills. Previous records reviewed . She has developed an abscess of her left foot and going to have I&D on 4/15/2022 by Dr. Debbie Mullins at [facility]. She wants pre op clearance for it.     Doing Ok. Denies CP or SOB. She denies any fever, sore throat, cough or chest congestion. She had gastric bypass surgery on 8/16/2021 when she was @ 340 lbs. She has lost @ 100 lbs since then. She has generalized aches and pains mostly in her lower back and legs for which she is seeing pain management doctor and taking Percocet from them. Denies any abdominal pain. She has chronic nausea and takes Phenergan frequently. She has history recurrent pancreatitis but none since several months. Bowels are moving okay. No urinary symptoms. Hearing is good. Vision okay with glasses. Denies any significant skin lesions. She has a history of seizure disorder but no recent seizures. Last seizure was more than a year ago. She has been fully vaccinated for COVID-19 including the booster dose. Labs , chest x rays and EKG from 3/17/2022 reviewed and explained to the patient.     Past Medical History:    Patient Active Problem List   Diagnosis    Fibromyalgia    Lupus (systemic lupus erythematosus) (HCC)    Chronic pancreatitis (HCC)    HTN (hypertension)    Frequent UTI    Gastroesophageal reflux disease without esophagitis    Chronic depression    Fatty liver disease, nonalcoholic    Arthritis    Bilateral low back pain with left-sided sciatica    Hiatal hernia    Pseudoseizure    Chronic pain syndrome    Drug-seeking/Aberrant behavior    Lymph node disorder    Morbid obesity with BMI of 40.0-44.9, adult (HCC)    Iron deficiency anemia    History of Jet-en-Y gastric bypass    Anxiety    RUQ pain    Discoloration of skin of flank resembling ecchymosis    Chest pain of uncertain etiology    Cellulitis of left thigh    Malabsorption    COPD (chronic obstructive pulmonary disease) (HCC)    Chronic nausea    Hypothyroidism due to acquired atrophy of thyroid    Vitamin D deficiency    Irritable bowel syndrome    Malabsorption due to intolerance, not elsewhere classified    Port-A-Cath in place        Past Surgical History:        Procedure Laterality Date    APPENDECTOMY  02/1998    Done When Tubes And Ovaries Were Removed    CARDIAC CATHETERIZATION  10/24/2010    CATHETER REMOVAL N/A 7/16/2019    PORT REMOVAL performed by Chris Garner MD at [address] [facility]  08/27/1991    CHOLECYSTECTOMY, LAPAROSCOPIC  1990's    COLONOSCOPY  Last Done In 2000's    DENTAL SURGERY      Teeth Extracted In Past    ENDOSCOPY, COLON, DIAGNOSTIC  Last Done In 2018    FOOT DEBRIDEMENT Left 6/16/2019    FIRST METATARSAL DEBRIDEMENT INCISION AND DRAINAGE. EXCISION OF ULCER.  RESECTION OF BONE. 1ST METATARSAL POWER LAVAGE AND BONE CEMENT performed by Donna Baxter DPM at [address] [place] [address] Left Last Done In 2016     Dr. Rabia Villar, \" About 6 Surgeries Done Because Of Staph Infection\"    HIATAL HERNIA REPAIR N/A 2/12/2019    HERNIA HIATAL LAPAROSCOPIC ROBOTIC performed by Chris Garner MD at [address] [place] [address]  10/1997    Partial Abdominal Hysterectomy    INSERTION / REMOVAL / REPLACEMENT VENOUS ACCESS CATHETER N/A 8/15/2019    PORT INSERTION performed by Chris Garner MD at [address] [place] [address] Avenue  ~2000    removed after 6 months    LEG BIOPSY EXCISION Left 3/8/2021    LEFT THIGH LESION BIOPSY EXCISION performed by Chris Garner MD at [facility], PARTIAL Left 2008    Benign    OTHER SURGICAL HISTORY  02/1998 \"Tubes And Ovaries Removed, Appendectomy Also Done\"    OTHER SURGICAL HISTORY  Last Done 7-15-16    Mediport Insertion \"Total Of Six Done, Removed Last Mediport In 2014\"    PORT SURGERY N/A 1/15/2020    PORT REMOVAL performed by Diana Sanders MD at [facility] N/A 7/6/2020    PORT INSERTION performed by Diana Sanders MD at [facility] 8/3/2021    MEDIPORT REPLACEMENT performed by Diana Sanders MD at [address] [place] [address] N/A 8/16/2021    GASTRIC BYPASS RUFINO-EN-Y LAPAROSCOPIC ROBOTIC, LYSIS OF ADHESIONS performed by Diana Sanders MD at [address] [facility] N/A 2/12/2019    GASTRECTOMY SLEEVE LAPAROSCOPIC ROBOTIC performed by Diana Sanders MD at [facility]  08/27/2018    UPPER GASTROINTESTINAL ENDOSCOPY N/A 4/2/2019    EGD CONTROL HEMORRHAGE with epi injection at bleeding site performed by Diana Sanders MD at [address] 6/19/2019    EGD DIAGNOSTIC ONLY performed by Diana Sanders MD at [facility] N/A 7/22/2019    EGD DIAGNOSTIC ONLY performed by Jose Sandra MD at [facility] N/ 10/14/2019    EGD DIAGNOSTIC ONLY performed by Diana Sanders MD at [facility] N 7/17/2020    EGJ DILATATION BALLOON WITH 18-20 MM BALLOON, DILATED TO 20 MM. performed by Diana Sanders MD at [facility] N/ 5/28/2021    EGD BIOPSY performed by Diana Sanders MD at [facility] ENDOSCOPY       Social History:   Social History     Tobacco Use    Smoking status: Never Smoker    Smokeless tobacco: Never Used   Substance Use Topics    Alcohol use: No     Alcohol/week: 0.0 standard drinks       Family History:       Problem Relation Age of Onset    Diabetes Mother Shiva Taveras MD   oxyCODONE-acetaminophen (PERCOCET) 5-325 MG per tablet TAKE 1 TABLET BY MOUTH EVERY 6 HOURS AS NEEDED FOR 28 DAYS 8/28/21  Yes Historical Provider, MD   NARCAN 4 MG/0.1ML LIQD nasal spray USE AS DIRECTED AS NEEDED FOR SUSPECTED OPIOID OVERDOSE 8/20/21  Yes Historical Provider, MD   ondansetron (ZOFRAN ODT) 4 MG disintegrating tablet Take 1 tablet by mouth every 4 hours as needed for Nausea or Vomiting 8/18/21  Yes Cassie Freeman MD   scopolamine (TRANSDERM-SCOP, 1.5 MG,) transdermal patch Place 1 patch onto the skin every 72 hours 8/18/21 8/18/22 Yes Cassie Freeman MD   scopolamine (TRANSDERM-SCOP) transdermal patch APPLY 1 PATCH TRANSDERMALLY TO THE SKIN BEHIND THE EAR ONCE EVERY 3 DAYS AS NEEDED 7/4/21  Yes Jared Kiser PA-C   Melatonin 10 MG TABS Take 10-20 mg by mouth nightly as needed   Yes Historical Provider, MD   Cyanocobalamin (VITAMIN B 12 PO) Take 1 tablet by mouth daily   Yes Historical Provider, MD   meclizine (ANTIVERT) 25 MG tablet Take 25 mg by mouth 3 times daily as needed for Dizziness   Yes Historical Provider, MD   levETIRAcetam (KEPPRA) 1000 MG tablet Take 1 tablet by mouth 2 times daily 5/13/21  Yes Fazal Valencia MD   betamethasone valerate (VALISONE) 0.1 % ointment APPLY OINTMENT TO AFFECTED AREA TWICE DAILY TO HANDS AND ELBOWS FOR 21 DAYS 3/26/21  Yes Historical Provider, MD   potassium gluconate 550 mg tablet Take 1 tablet by mouth daily 3/25/21  Yes Historical Provider, MD   tiZANidine (ZANAFLEX) 4 MG tablet Take 4 mg by mouth every 8 hours as needed  3/3/21  Yes Historical Provider, MD   ferrous sulfate (IRON 325) 325 (65 Fe) MG tablet Take 1 tablet by mouth daily (with breakfast) 10/23/20  Yes Cassie Freeman MD   Cholecalciferol (VITAMIN D3) 5000 units TABS Take 1 tablet by mouth daily   Yes Historical Provider, MD   Multiple Vitamin (MVI, BARIATRIC ADVANTAGE MULTI-FORMULA, CHEW TAB) Take 1 tablet by mouth daily 2/22/19  Yes Cassie Freeman MD medicationss. Low salt diet and exercise advised. Continue clonidine and Tenormin.  - atenolol (TENORMIN) 25 MG tablet; Take 1 tablet by mouth once daily  Dispense: 30 tablet; Refill: 2    3. Hypothyroidism due to acquired atrophy of thyroid  Patient on Synthroid. 4. Chronic obstructive pulmonary disease, unspecified COPD type ([facility] [address]  Continue albuterol HFA as needed. 5. Morbid obesity with BMI of 40.0-44.9, adult [facility])  Status post bariatric surgery. Advised to continue diet and exercise and weight loss. Advised to continue to follow with her bariatric surgeon as per his recommendations. 6. Gastroesophageal reflux disease without esophagitis  Patient is on a high dose of Protonix. She does complain of chronic gastritis symptoms. Advised to cut back Protonix to once a day as much as possible. - pantoprazole (PROTONIX) 40 MG tablet; Take 1 tablet by mouth 2 times daily  Dispense: 60 tablet; Refill: 2    7. Fatty liver disease, nonalcoholic  Advised diet, exercise and weight loss. 8. Chronic depression  Continue Paxil    9. Anxiety  Continue Vistaril as needed  - hydrOXYzine (VISTARIL) 50 MG capsule; Take 1 capsule by mouth 2 times daily  Dispense: 30 capsule; Refill: 2    10. Iron deficiency anemia due to chronic blood loss  Patient on ferrous sulfate. 11. Chronic pain syndrome  Advised to continue to follow with her pain management doctor and take pain medication from them. Also can continue on Zanaflex and Tylenol as needed    12. Vitamin D deficiency  Continue vitamin D3    13. Pseudoseizure  Patient is on Keppra. No recent seizures. 14. Chronic nausea  Continue Phenergan as needed. - promethazine (PHENERGAN) 25 MG tablet; Take 1 tablet by mouth every 8 hours as needed for Nausea  Dispense: 30 tablet; Refill: 0    Advised to continue to follow COVID-19 precautions    Care discussed with patient. Questions answered and patient verbalizes understanding and agrees with plan.    Medications reviewed and reconciled. Continue current medications. Appropriate prescriptions are ordered. Risks and benefits of meds are discussed. After visit summary provided. Advised to call for any problems, questions, or concerns. If symptoms worsen or don't improve as expected, to call us or go to ER. Follow up as directed, sooner if needed. Return in about 3 months (around 7/6/2022). This dictation was performed with a verbal recognition program and it was checked for errors. It is possible that there are still dictated errors within this office note. Any errors should be brought immediately to my attention for correction. All efforts were made to ensure that this office note is accurate.      Kiera Kohli MD MD

## 2022-04-11 ENCOUNTER — ANESTHESIA EVENT (OUTPATIENT)
Dept: OPERATING ROOM | Age: 52
End: 2022-04-11
Payer: COMMERCIAL

## 2022-04-11 NOTE — ANESTHESIA PRE PROCEDURE
Department of Anesthesiology  Preprocedure Note       Name:  Joannie Osler   Age:  46 y.o.  :  1970                                          MRN:  [id number]         Date:  2022      Surgeon: Derrick Ling):  Nick Houston DPM    Procedure: Procedure(s):  LEFT FOOT ABSCESS DEBRIDEMENT INCISION AND DRAINAGE    Medications prior to admission:   Prior to Admission medications    Medication Sig Start Date End Date Taking?  Authorizing Provider   atenolol (TENORMIN) 25 MG tablet Take 1 tablet by mouth once daily 22   Dianne Wilkinson MD   pantoprazole (PROTONIX) 40 MG tablet Take 1 tablet by mouth 2 times daily 22   Dianne Wilkinson MD   promethazine (PHENERGAN) 25 MG tablet Take 1 tablet by mouth every 8 hours as needed for Nausea 22   Dianne Wilkinson MD   hydrOXYzine (VISTARIL) 50 MG capsule Take 1 capsule by mouth 2 times daily 22   Dianne Wilkinson MD   levothyroxine (SYNTHROID) 125 MCG tablet Take 1 tablet by mouth Daily 22   Dianne Wilkinson MD   PARoxetine (PAXIL) 30 MG tablet Take 1.5 tablets by mouth nightly 22   Dianne Wilkinson MD   estradiol (ESTRACE) 0.5 MG tablet Take 1 tablet by mouth daily 21   Dianne Wilkinson MD   cloNIDine (CATAPRES) 0.1 MG tablet Take 1 tablet by mouth 2 times daily 21   Dianne Wilkinson MD   benzocaine (ORAJEL) 10 % mucosal gel Apply to the ulcer 2-3 times a day 21   Dianne Wilkinson MD   albuterol (PROVENTIL) (2.5 MG/3ML) 0.083% nebulizer solution Take 3 mLs by nebulization every 6 hours as needed for Wheezing 21   Ranjit Lozoya MD   albuterol sulfate  (90 Base) MCG/ACT inhaler Inhale 2 puffs into the lungs 4 times daily 21   Ranjit Lozoya MD   oxyCODONE-acetaminophen (PERCOCET) 5-325 MG per tablet TAKE 1 TABLET BY MOUTH EVERY 6 HOURS AS NEEDED FOR 28 DAYS 8/28/21   Historical Provider, MD   NARCAN 4 MG/0.1ML LIQD nasal spray USE AS DIRECTED AS NEEDED FOR SUSPECTED OPIOID OVERDOSE 21   Historical Provider, MD ondansetron (ZOFRAN ODT) 4 MG disintegrating tablet Take 1 tablet by mouth every 4 hours as needed for Nausea or Vomiting 8/18/21   Chris Garner MD   scopolamine (TRANSDERM-SCOP, 1.5 MG,) transdermal patch Place 1 patch onto the skin every 72 hours 8/18/21 8/18/22  Chris Garner MD   scopolamine (TRANSDERM-SCOP) transdermal patch APPLY 1 PATCH TRANSDERMALLY TO THE SKIN BEHIND THE EAR ONCE EVERY 3 DAYS AS NEEDED 7/4/21   Amilcar Kiser PA-C   Melatonin 10 MG TABS Take 10-20 mg by mouth nightly as needed    Historical Provider, MD   Cyanocobalamin (VITAMIN B 12 PO) Take 1 tablet by mouth daily    Historical Provider, MD   meclizine (ANTIVERT) 25 MG tablet Take 25 mg by mouth 3 times daily as needed for Dizziness    Historical Provider, MD   levETIRAcetam (KEPPRA) 1000 MG tablet Take 1 tablet by mouth 2 times daily 5/13/21   Jluis Estevez MD   betamethasone valerate (VALISONE) 0.1 % ointment APPLY OINTMENT TO AFFECTED AREA TWICE DAILY TO HANDS AND ELBOWS FOR 21 DAYS 3/26/21   Historical Provider, MD   potassium gluconate 550 mg tablet Take 1 tablet by mouth daily 3/25/21   Historical Provider, MD   tiZANidine (ZANAFLEX) 4 MG tablet Take 4 mg by mouth every 8 hours as needed  3/3/21   Historical Provider, MD   ferrous sulfate (IRON 325) 325 (65 Fe) MG tablet Take 1 tablet by mouth daily (with breakfast) 10/23/20   Chris Garner MD   Cholecalciferol (VITAMIN D3) 5000 units TABS Take 1 tablet by mouth daily    Historical Provider, MD   Multiple Vitamin (MVI, BARIATRIC ADVANTAGE MULTI-FORMULA, CHEW TAB) Take 1 tablet by mouth daily 2/22/19   Chris Garner MD   Calcium Citrate-Vitamin D (CALCIUM + VIT D, BARIATRIC ADVANTAGE, CHEWABLE TABLET) Take 1 tablet by mouth 2 times daily 2/22/19   Chris Garner MD   gabapentin (NEURONTIN) 800 MG tablet Take 800 mg by mouth 3 times daily    Historical Provider, MD       Current medications:    Current Outpatient Medications   Medication Sig Dispense Refill  atenolol (TENORMIN) 25 MG tablet Take 1 tablet by mouth once daily 30 tablet 2    pantoprazole (PROTONIX) 40 MG tablet Take 1 tablet by mouth 2 times daily 60 tablet 2    promethazine (PHENERGAN) 25 MG tablet Take 1 tablet by mouth every 8 hours as needed for Nausea 30 tablet 0    hydrOXYzine (VISTARIL) 50 MG capsule Take 1 capsule by mouth 2 times daily 30 capsule 2    levothyroxine (SYNTHROID) 125 MCG tablet Take 1 tablet by mouth Daily 30 tablet 2    PARoxetine (PAXIL) 30 MG tablet Take 1.5 tablets by mouth nightly 45 tablet 3    estradiol (ESTRACE) 0.5 MG tablet Take 1 tablet by mouth daily 30 tablet 2    cloNIDine (CATAPRES) 0.1 MG tablet Take 1 tablet by mouth 2 times daily 60 tablet 3    benzocaine (ORAJEL) 10 % mucosal gel Apply to the ulcer 2-3 times a day 1 each 0    albuterol (PROVENTIL) (2.5 MG/3ML) 0.083% nebulizer solution Take 3 mLs by nebulization every 6 hours as needed for Wheezing 120 each 0    albuterol sulfate  (90 Base) MCG/ACT inhaler Inhale 2 puffs into the lungs 4 times daily 1 each 0    oxyCODONE-acetaminophen (PERCOCET) 5-325 MG per tablet TAKE 1 TABLET BY MOUTH EVERY 6 HOURS AS NEEDED FOR 28 DAYS      NARCAN 4 MG/0.1ML LIQD nasal spray USE AS DIRECTED AS NEEDED FOR SUSPECTED OPIOID OVERDOSE      ondansetron (ZOFRAN ODT) 4 MG disintegrating tablet Take 1 tablet by mouth every 4 hours as needed for Nausea or Vomiting 30 tablet 3    scopolamine (TRANSDERM-SCOP, 1.5 MG,) transdermal patch Place 1 patch onto the skin every 72 hours 10 patch 11    scopolamine (TRANSDERM-SCOP) transdermal patch APPLY 1 PATCH TRANSDERMALLY TO THE SKIN BEHIND THE EAR ONCE EVERY 3 DAYS AS NEEDED 10 patch 0    Melatonin 10 MG TABS Take 10-20 mg by mouth nightly as needed      Cyanocobalamin (VITAMIN B 12 PO) Take 1 tablet by mouth daily      meclizine (ANTIVERT) 25 MG tablet Take 25 mg by mouth 3 times daily as needed for Dizziness      levETIRAcetam (KEPPRA) 1000 MG tablet Take 1 tablet by mouth 2 times daily 60 tablet 3    betamethasone valerate (VALISONE) 0.1 % ointment APPLY OINTMENT TO AFFECTED AREA TWICE DAILY TO HANDS AND ELBOWS FOR 21 DAYS      potassium gluconate 550 mg tablet Take 1 tablet by mouth daily      tiZANidine (ZANAFLEX) 4 MG tablet Take 4 mg by mouth every 8 hours as needed       ferrous sulfate (IRON 325) 325 (65 Fe) MG tablet Take 1 tablet by mouth daily (with breakfast) 90 tablet 5    Cholecalciferol (VITAMIN D3) 5000 units TABS Take 1 tablet by mouth daily      Multiple Vitamin (MVI, BARIATRIC ADVANTAGE MULTI-FORMULA, CHEW TAB) Take 1 tablet by mouth daily 30 tablet 5    Calcium Citrate-Vitamin D (CALCIUM + VIT D, BARIATRIC ADVANTAGE, CHEWABLE TABLET) Take 1 tablet by mouth 2 times daily 120 tablet 5    gabapentin (NEURONTIN) 800 MG tablet Take 800 mg by mouth 3 times daily       No current facility-administered medications for this visit. Allergies: Allergies   Allergen Reactions    Aspirin Palpitations     \"My Heart Rate Elevates\"    Compazine [Prochlorperazine] Rash    Shellfish-Derived Products Swelling    Toradol [Ketorolac Tromethamine] Rash    Haldol [Haloperidol]      Shaking      Reglan [Metoclopramide] Itching       Problem List:    Patient Active Problem List   Diagnosis Code    Fibromyalgia M79.7    Lupus (systemic lupus erythematosus) (HCC) M32.9    Chronic pancreatitis (HCC) K86.1    HTN (hypertension) I10    Frequent UTI N39.0    Gastroesophageal reflux disease without esophagitis K21.9    Chronic depression F32. A    Fatty liver disease, nonalcoholic E17.1    Arthritis M19.90    Bilateral low back pain with left-sided sciatica M54.42    Hiatal hernia K44.9    Pseudoseizure F44.5    Chronic pain syndrome G89.4    Drug-seeking/Aberrant behavior Z76.5    Lymph node disorder I89.9    Morbid obesity with BMI of 40.0-44.9, adult (HCC) E66.01, Z68.41    Iron deficiency anemia D50.9    History of Jet-en-Y gastric bypass Z98.84    Anxiety F41.9    RUQ pain R10.11    Discoloration of skin of flank resembling ecchymosis L81.9    Chest pain of uncertain etiology D78.7    Cellulitis of left thigh L03. 116    Malabsorption K90.9    COPD (chronic obstructive pulmonary disease) ([facility]) J44.9    Chronic nausea R11.0    Hypothyroidism due to acquired atrophy of thyroid E03.4    Vitamin D deficiency E55.9    Irritable bowel syndrome K58.9    Malabsorption due to intolerance, not elsewhere classified K90.49    Port-A-Cath in place Z95.828       Past Medical History:        Diagnosis Date    Acid reflux     Anemia     Anxiety     Arthritis     Hands, Back And Ankles    Bleeding ulcer 2014    \"I Had Ulcers In My Stomach And Colon\"/ per pt on 8/12/2019\"they said recently having some blood in my stomach- in July ( 2019)could not find where coming from \"    Bronchitis Last Episode 2014    Chronic back pain     [facility]     [facility]    COPD (chronic obstructive [facility] disease) [facility]     Sees Dr. Michal Boxer Depression     Fibromyalgia Dx 2013    GERD (gastroesophageal reflux disease)     H/O echocardiogram 08/11/2015    EF >55%. LA to be at the upper limit of normal in size. LV hypertrophy with normal LV systolic, but abnormal diastolic function. Normal valvular structures and function.  H/O echocardiogram 08/30/2018    EF 55-60%    Hiatal hernia     History of blood transfusion 09/2015 And 2018    No Reaction To Blood Transfusions Received    History of sleeve gastrectomy     [place] (hard of hearing)     Right Ear    Hx of cardiac catheterization 10/24/2010    Angiographically normal coronary arteries w/ normal LV function and wall motion.  Hx of transesophageal echocardiography (PILO) for monitoring 12/02/2010    EF 55-60%. WNL.    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 name   Piper Robbins cysts     Nicholas Pritchard found that I have a kidney cyst but not sure which side\" per pt on 7/15/2020    Lupus ([address] Dx 2013    \"Rheumatoid Lupus\"    MDRO (multiple drug resistant organisms) resistance     4 months ago chest from mediport    Morbid obesity ([address]     MRSA bacteremia     Nausea     \"Most Of The Time\"    Osteomyelitis of fifth toe of left foot ([address]     Pain management     Sees Dr. Cooper Lopes, Once A Month    Pancreatitis chronic Dx 2001    Pneumonia Dx 11-15    Seizures ([address]     Shortness of breath on exertion     Shortness of breath on exertion     Sleep apnea     Has CPAP\"no longer use the cpap since lost weight\"    Staph infection Dx 11-15    Left Foot    Staph infection Dx 11-15    \"Left Foot\"    Teeth missing     Upper And Lower    Thyroid disease     Wears glasses     To Read       Past Surgical History:        Procedure Laterality Date    APPENDECTOMY  02/1998    Done When Tubes And Ovaries Were Removed    CARDIAC CATHETERIZATION  10/24/2010    CATHETER REMOVAL N/A 7/16/2019    PORT REMOVAL performed by Kim Marcelo MD at [address] [place] [address]  08/27/1991    CHOLECYSTECTOMY, LAPAROSCOPIC  1990's    COLONOSCOPY  Last Done In 2000's    DENTAL SURGERY      Teeth Extracted In Past    ENDOSCOPY, COLON, DIAGNOSTIC  Last Done In 2018    FOOT DEBRIDEMENT Left 6/16/2019    FIRST METATARSAL DEBRIDEMENT INCISION AND DRAINAGE. EXCISION OF ULCER.  RESECTION OF BONE. 1ST METATARSAL POWER LAVAGE AND BONE CEMENT performed by Chad Berumen DPM at [address] Left Last Done In 2016     Dr. Simi Upton, \" About 6 Surgeries Done Because Of Staph Infection\"    HIATAL HERNIA REPAIR N/A 2/12/2019    HERNIA HIATAL LAPAROSCOPIC ROBOTIC performed by Kim Marcelo MD at [place] [address]  HYSTERECTOMY  10/1997    Partial Abdominal Hysterectomy    INSERTION / REMOVAL / REPLACEMENT VENOUS ACCESS CATHETER N/A 8/15/2019    PORT INSERTION performed by Dola Galeazzi, MD at [address]    removed after 6 months    LEG BIOPSY EXCISION Left 3/8/2021    LEFT THIGH LESION BIOPSY EXCISION performed by Dola Galeazzi, MD at [address], PARTIAL Left 2008    Benign    OTHER SURGICAL HISTORY  02/1998    \"Tubes And Ovaries Removed, Appendectomy Also Done\"    OTHER SURGICAL HISTORY  Last Done 7-15-16    Mediport Insertion \"Total Of Six Done, Removed Last Mediport In 2014\"    PORT SURGERY N/A 1/15/2020    PORT REMOVAL performed by Dola Galeazzi, MD at [address] N/A 7/6/2020    PORT INSERTION performed by Dola Galeazzi, MD at [address] Left 8/3/2021    MEDIPORT REPLACEMENT performed by Dola Galeazzi, MD at [address] [place] [address] N/A 8/16/2021    GASTRIC BYPASS RUFINO-EN-Y LAPAROSCOPIC ROBOTIC, LYSIS OF ADHESIONS performed by Dola Galeazzi, MD at [facility] N/A 2/12/2019    GASTRECTOMY SLEEVE LAPAROSCOPIC ROBOTIC performed by Dola Galeazzi, MD at [address] [facility]  08/27/2018    UPPER GASTROINTESTINAL ENDOSCOPY N/A 4/2/2019    EGD CONTROL HEMORRHAGE with epi injection at bleeding site performed by Dola Galeazzi, MD at [facility] 6/19/2019    EGD DIAGNOSTIC ONLY performed by Dola Galeazzi, MD at [facility] N/A 7/22/2019    EGD DIAGNOSTIC ONLY performed by Rashida Estevez MD at [facility] 10/14/2019    EGD DIAGNOSTIC ONLY performed by Dola Galeazzi, MD at [facility] N/A 7/17/2020    EGJ DILATATION BALLOON WITH 18-20 MM BALLOON, DILATED TO 20 MM. performed by Lamar Chavez MD at [facility] behaview N/A 5/28/2021    EGD BIOPSY performed by Lamar Chavez MD at [facility] ENDOSCOPY       Social History:    Social History     Tobacco Use    Smoking status: Never Smoker    Smokeless tobacco: Never Used   Substance Use Topics    Alcohol use: No     Alcohol/week: 0.0 standard drinks                                Counseling given: Not Answered      Vital Signs (Current): There were no vitals filed for this visit. BP Readings from Last 3 Encounters:   04/06/22 132/68   03/17/22 123/63   09/17/21 118/72       NPO Status:                                                                                 BMI:   Wt Readings from Last 3 Encounters:   04/06/22 238 lb 14.4 oz (108.4 kg)   12/02/21 250 lb (113.4 kg)   09/17/21 260 lb (117.9 kg)     There is no height or weight on file to calculate BMI.    CBC:   Lab Results   Component Value Date    WBC 4.2 03/17/2022    RBC 3.96 03/17/2022    HGB 10.8 03/17/2022    HCT 33.8 03/17/2022    MCV 85.4 03/17/2022    RDW 12.8 03/17/2022     03/17/2022       CMP:   Lab Results   Component Value Date     03/17/2022    K 4.2 03/17/2022     03/17/2022    CO2 23 03/17/2022    BUN 12 03/17/2022    CREATININE 0.8 03/17/2022    GFRAA >60 03/17/2022    LABGLOM >60 03/17/2022    GLUCOSE 97 03/17/2022    PROT 6.4 03/17/2022    PROT 6.5 11/18/2011    CALCIUM 9.6 03/17/2022    BILITOT 0.2 03/17/2022    ALKPHOS 121 03/17/2022    AST 14 03/17/2022    ALT 11 03/17/2022       POC Tests: No results for input(s): POCGLU, POCNA, POCK, POCCL, POCBUN, POCHEMO, POCHCT in the last 72 hours.     Coags:   Lab Results   Component Value Date    PROTIME 10.4 02/12/2021    PROTIME 11.8 01/25/2021    INR 0.86 02/12/2021    APTT 30.4 02/12/2021       HCG (If Applicable):   Lab Results   Component Value Date    PREGTESTUR NEGATIVE 07/10/2017        ABGs: No results found for: PHART, PO2ART, JMX6LFP, AWM0JAG, BEART, H8TWDKHH     Type & Screen (If Applicable):  No results found for: LABABO, LABRH    Drug/Infectious Status (If Applicable):  Lab Results   Component Value Date    HEPCAB 0.18 11/18/2015       COVID-19 Screening (If Applicable):   Lab Results   Component Value Date    COVID19 NOT DETECTED 12/02/2021           Anesthesia Evaluation  Patient summary reviewed  Airway: Mallampati: I  TM distance: >3 FB   Neck ROM: full  Mouth opening: > = 3 FB Dental:          Pulmonary:normal exam    (+) COPD: mild,  sleep apnea: on noncompliant,                             Cardiovascular:  Exercise tolerance: poor (<4 METS),   (+) hypertension: moderate,       ECG reviewed        Stress test reviewed             ROS comment: 2021 stress  Impression:   Normal stress echo. No regional wall motion abnormalities. Left ventricular   hypertrophy noted. Normal sinus rhythm   Possible Left atrial enlargement   Borderline ECG   When compared with ECG of 12-SEP-2021 18:57,   No significant change was found   Confirmed by Conner Cheng, OPAL ([id number]) on 3/17/2022 7:37:53 PM      Neuro/Psych:   (+) seizures: well controlled, neuromuscular disease:, psychiatric history:depression/anxiety              ROS comment: Chronic pain   Fibro  pseudoseizure GI/Hepatic/Renal:   (+) hiatal hernia, GERD: poorly controlled, PUD, liver disease:, morbid obesity         ROS comment: Chronic nausea  Hx pancreatitis . Endo/Other:    (+) hypothyroidism: arthritis: OA and rheumatoid. , .                  ROS comment: SLE Abdominal:   (+) obese,           Vascular: negative vascular ROS. Other Findings:             Anesthesia Plan      general     ASA 4       Induction: intravenous and rapid sequence. MIPS: Postoperative opioids intended and Prophylactic antiemetics administered. Anesthetic plan and risks discussed with patient. Use of blood products discussed with patient whom consented to blood products.    Plan discussed with CRNA.                 Myles Verdin, MICHAEL - JOSE MARIA   4/11/2022

## 2022-04-12 NOTE — PROGRESS NOTES
Surgery Date: 4/15/22                                   Arrive at: 0600  Surgery time: 0730     If your arrival time is before 7 AM come in the emergency room entrance. If after 7 AM come in the main entrance. Two visitors may accompany you to the hospital.  Everyone must wear a mask and be free of covid symptoms. 1. Do not eat or drink anything after midnight - unless instructed by your doctor prior to surgery. This includes                  no water, chewing gum or mints. 2. Follow your directions as prescribed by the doctor for your procedure and medications. Take the following medications with a small sip of water the morning of: inhaler, atenolol, Keppra, Protonix, Scopolamine, Clonidine              3. Check with your Doctor regarding stopping Plavix, Coumadin, Lovenox, Effient, Pradaxa, Xarelto, Fragmin or other blood thinners and                  follow their instructions. 4. Do not smoke and do not drink any alcoholic beverages 24 hours prior to surgery. 5. You may brush your teeth and gargle the morning of surgery. DO NOT SWALLOW WATER  6. Please wear simple, loose fitting clothing to the hospital.  Klaudia Primus not bring valuables (money, credit cards, checkbooks, etc.) Do not wear any                  makeup (including no eye makeup) or nail polish on your fingers or toes. 7.  DO NOT wear any jewelry or piercings on day of surgery. All body piercing jewelry must be removed. 8.  Take a shower the night before or morning of your procedure, do not apply any lotion, oil or powder. 9. If you have dentures, they will be removed before going to the OR; we will provide you a container. If you wear contact lenses or glasses,                 they will be removed; please bring a case for them. 10. If you  have a Living Will and Durable Power of  for Healthcare, please bring in a copy.            11. Please bring picture ID,  insurance card, paperwork from the doctors office    (H & P, Consent, & card for implantable devices). 12. You MUST make arrangements for a responsible adult to take you home after your surgery and be able to check on you every couple                  hours for the day. You will not be allowed to leave alone or drive yourself home. It is strongly suggested someone stay with you the first 24                  hrs. Your surgery will be cancelled if you do not have a ride home. 13. Wear a mask covering your nose & mouth when entering the hospital. Please call [phone] with any questions.

## 2022-04-14 DIAGNOSIS — I10 ESSENTIAL HYPERTENSION: ICD-10-CM

## 2022-04-14 DIAGNOSIS — G40.909 SEIZURE DISORDER (HCC): ICD-10-CM

## 2022-04-14 RX ORDER — CLONIDINE HYDROCHLORIDE 0.1 MG/1
TABLET ORAL
Qty: 60 TABLET | Refills: 0 | Status: ON HOLD | OUTPATIENT
Start: 2022-04-14 | End: 2022-05-02 | Stop reason: HOSPADM

## 2022-04-14 RX ORDER — LEVETIRACETAM 1000 MG/1
TABLET ORAL
Qty: 60 TABLET | Refills: 0 | Status: SHIPPED | OUTPATIENT
Start: 2022-04-14 | End: 2022-05-03

## 2022-04-15 ENCOUNTER — ANESTHESIA (OUTPATIENT)
Dept: OPERATING ROOM | Age: 52
End: 2022-04-15
Payer: COMMERCIAL

## 2022-04-15 ENCOUNTER — HOSPITAL ENCOUNTER (OUTPATIENT)
Age: 52
Setting detail: OBSERVATION
Discharge: HOME | End: 2022-04-16
Attending: PODIATRIST | Admitting: INTERNAL MEDICINE
Payer: COMMERCIAL

## 2022-04-15 VITALS
DIASTOLIC BLOOD PRESSURE: 84 MMHG | OXYGEN SATURATION: 99 % | RESPIRATION RATE: 8 BRPM | SYSTOLIC BLOOD PRESSURE: 131 MMHG | TEMPERATURE: 97.7 F

## 2022-04-15 DIAGNOSIS — M67.472 GANGLION CYST OF LEFT FOOT: Primary | ICD-10-CM

## 2022-04-15 DIAGNOSIS — M71.072 ABSCESS OF BURSA, LEFT ANKLE AND FOOT: ICD-10-CM

## 2022-04-15 DIAGNOSIS — M79.672 LEFT FOOT PAIN: ICD-10-CM

## 2022-04-15 DIAGNOSIS — L02.91 ABSCESS: ICD-10-CM

## 2022-04-15 PROBLEM — F44.5 PSEUDOSEIZURES: Status: ACTIVE | Noted: 2022-04-15

## 2022-04-15 PROBLEM — R56.9 PSEUDOSEIZURES (HCC): Status: ACTIVE | Noted: 2022-04-15

## 2022-04-15 LAB
ANION GAP SERPL CALCULATED.3IONS-SCNC: 9 MMOL/L (ref 4–16)
BUN BLDV-MCNC: 12 MG/DL (ref 6–23)
CALCIUM SERPL-MCNC: 9.6 MG/DL (ref 8.3–10.6)
CHLORIDE BLD-SCNC: 106 MMOL/L (ref 99–110)
CO2: 24 MMOL/L (ref 21–32)
CREAT SERPL-MCNC: 0.8 MG/DL (ref 0.6–1.1)
GFR AFRICAN AMERICAN: >60 ML/MIN/1.73M2
GFR NON-AFRICAN AMERICAN: >60 ML/MIN/1.73M2
GLUCOSE BLD-MCNC: 69 MG/DL (ref 70–99)
HCT VFR BLD CALC: 32.9 % (ref 37–47)
HEMOGLOBIN: 10.3 GM/DL (ref 12.5–16)
MCH RBC QN AUTO: 27 PG (ref 27–31)
MCHC RBC AUTO-ENTMCNC: 31.3 % (ref 32–36)
MCV RBC AUTO: 86.1 FL (ref 78–100)
PDW BLD-RTO: 13.1 % (ref 11.7–14.9)
PLATELET # BLD: 168 K/CU MM (ref 140–440)
PMV BLD AUTO: 10.3 FL (ref 7.5–11.1)
POTASSIUM SERPL-SCNC: 3.9 MMOL/L (ref 3.5–5.1)
RBC # BLD: 3.82 M/CU MM (ref 4.2–5.4)
SODIUM BLD-SCNC: 139 MMOL/L (ref 135–145)
WBC # BLD: 3.9 K/CU MM (ref 4–10.5)

## 2022-04-15 PROCEDURE — 3600000012 HC SURGERY LEVEL 2 ADDTL 15MIN: Performed by: PODIATRIST

## 2022-04-15 PROCEDURE — 2580000003 HC RX 258: Performed by: PODIATRIST

## 2022-04-15 PROCEDURE — 3700000001 HC ADD 15 MINUTES (ANESTHESIA): Performed by: PODIATRIST

## 2022-04-15 PROCEDURE — 6360000002 HC RX W HCPCS: Performed by: PODIATRIST

## 2022-04-15 PROCEDURE — 87070 CULTURE OTHR SPECIMN AEROBIC: CPT

## 2022-04-15 PROCEDURE — 96365 THER/PROPH/DIAG IV INF INIT: CPT

## 2022-04-15 PROCEDURE — 6360000002 HC RX W HCPCS: Performed by: NURSE ANESTHETIST, CERTIFIED REGISTERED

## 2022-04-15 PROCEDURE — 7100000001 HC PACU RECOVERY - ADDTL 15 MIN: Performed by: PODIATRIST

## 2022-04-15 PROCEDURE — G0378 HOSPITAL OBSERVATION PER HR: HCPCS

## 2022-04-15 PROCEDURE — 7100000000 HC PACU RECOVERY - FIRST 15 MIN: Performed by: PODIATRIST

## 2022-04-15 PROCEDURE — 2500000003 HC RX 250 WO HCPCS: Performed by: NURSE ANESTHETIST, CERTIFIED REGISTERED

## 2022-04-15 PROCEDURE — 85027 COMPLETE CBC AUTOMATED: CPT

## 2022-04-15 PROCEDURE — 87205 SMEAR GRAM STAIN: CPT

## 2022-04-15 PROCEDURE — 96375 TX/PRO/DX INJ NEW DRUG ADDON: CPT

## 2022-04-15 PROCEDURE — 87075 CULTR BACTERIA EXCEPT BLOOD: CPT

## 2022-04-15 PROCEDURE — 80048 BASIC METABOLIC PNL TOTAL CA: CPT

## 2022-04-15 PROCEDURE — 36415 COLL VENOUS BLD VENIPUNCTURE: CPT

## 2022-04-15 PROCEDURE — 3700000000 HC ANESTHESIA ATTENDED CARE: Performed by: PODIATRIST

## 2022-04-15 PROCEDURE — 6360000002 HC RX W HCPCS: Performed by: NURSE PRACTITIONER

## 2022-04-15 PROCEDURE — 96376 TX/PRO/DX INJ SAME DRUG ADON: CPT

## 2022-04-15 PROCEDURE — 6370000000 HC RX 637 (ALT 250 FOR IP): Performed by: NURSE PRACTITIONER

## 2022-04-15 PROCEDURE — 2709999900 HC NON-CHARGEABLE SUPPLY: Performed by: PODIATRIST

## 2022-04-15 PROCEDURE — 3600000002 HC SURGERY LEVEL 2 BASE: Performed by: PODIATRIST

## 2022-04-15 PROCEDURE — 2500000003 HC RX 250 WO HCPCS: Performed by: PODIATRIST

## 2022-04-15 PROCEDURE — 94761 N-INVAS EAR/PLS OXIMETRY MLT: CPT

## 2022-04-15 RX ORDER — SODIUM CHLORIDE 0.9 % (FLUSH) 0.9 %
5-40 SYRINGE (ML) INJECTION EVERY 12 HOURS SCHEDULED
Status: DISCONTINUED | OUTPATIENT
Start: 2022-04-15 | End: 2022-04-15 | Stop reason: HOSPADM

## 2022-04-15 RX ORDER — ACETAMINOPHEN 650 MG/1
650 SUPPOSITORY RECTAL EVERY 6 HOURS PRN
Status: DISCONTINUED | OUTPATIENT
Start: 2022-04-15 | End: 2022-04-16 | Stop reason: HOSPADM

## 2022-04-15 RX ORDER — ACETAMINOPHEN 650 MG/1
650 SUPPOSITORY RECTAL EVERY 6 HOURS PRN
Status: DISCONTINUED | OUTPATIENT
Start: 2022-04-15 | End: 2022-04-15

## 2022-04-15 RX ORDER — ONDANSETRON 2 MG/ML
4 INJECTION INTRAMUSCULAR; INTRAVENOUS EVERY 6 HOURS PRN
Status: DISCONTINUED | OUTPATIENT
Start: 2022-04-15 | End: 2022-04-16 | Stop reason: HOSPADM

## 2022-04-15 RX ORDER — SODIUM CHLORIDE 9 MG/ML
INJECTION, SOLUTION INTRAVENOUS PRN
Status: DISCONTINUED | OUTPATIENT
Start: 2022-04-15 | End: 2022-04-16 | Stop reason: HOSPADM

## 2022-04-15 RX ORDER — SODIUM CHLORIDE 0.9 % (FLUSH) 0.9 %
5-40 SYRINGE (ML) INJECTION PRN
Status: DISCONTINUED | OUTPATIENT
Start: 2022-04-15 | End: 2022-04-15 | Stop reason: HOSPADM

## 2022-04-15 RX ORDER — ROCURONIUM BROMIDE 10 MG/ML
INJECTION, SOLUTION INTRAVENOUS PRN
Status: DISCONTINUED | OUTPATIENT
Start: 2022-04-15 | End: 2022-04-15 | Stop reason: SDUPTHER

## 2022-04-15 RX ORDER — PROPOFOL 10 MG/ML
INJECTION, EMULSION INTRAVENOUS PRN
Status: DISCONTINUED | OUTPATIENT
Start: 2022-04-15 | End: 2022-04-15 | Stop reason: SDUPTHER

## 2022-04-15 RX ORDER — OXYCODONE AND ACETAMINOPHEN 7.5; 325 MG/1; MG/1
1 TABLET ORAL EVERY 4 HOURS PRN
Status: DISCONTINUED | OUTPATIENT
Start: 2022-04-15 | End: 2022-04-16 | Stop reason: HOSPADM

## 2022-04-15 RX ORDER — BUPIVACAINE HYDROCHLORIDE 5 MG/ML
INJECTION, SOLUTION EPIDURAL; INTRACAUDAL
Status: COMPLETED | OUTPATIENT
Start: 2022-04-15 | End: 2022-04-15

## 2022-04-15 RX ORDER — GABAPENTIN 400 MG/1
800 CAPSULE ORAL 3 TIMES DAILY
Status: DISCONTINUED | OUTPATIENT
Start: 2022-04-15 | End: 2022-04-16 | Stop reason: HOSPADM

## 2022-04-15 RX ORDER — ACETAMINOPHEN 325 MG/1
650 TABLET ORAL EVERY 6 HOURS PRN
Status: DISCONTINUED | OUTPATIENT
Start: 2022-04-15 | End: 2022-04-16 | Stop reason: HOSPADM

## 2022-04-15 RX ORDER — ATENOLOL 25 MG/1
25 TABLET ORAL DAILY
Status: DISCONTINUED | OUTPATIENT
Start: 2022-04-15 | End: 2022-04-16 | Stop reason: HOSPADM

## 2022-04-15 RX ORDER — POLYETHYLENE GLYCOL 3350 17 G/17G
17 POWDER, FOR SOLUTION ORAL DAILY PRN
Status: DISCONTINUED | OUTPATIENT
Start: 2022-04-15 | End: 2022-04-16 | Stop reason: HOSPADM

## 2022-04-15 RX ORDER — FENTANYL CITRATE 50 UG/ML
INJECTION, SOLUTION INTRAMUSCULAR; INTRAVENOUS PRN
Status: DISCONTINUED | OUTPATIENT
Start: 2022-04-15 | End: 2022-04-15 | Stop reason: SDUPTHER

## 2022-04-15 RX ORDER — ONDANSETRON 2 MG/ML
INJECTION INTRAMUSCULAR; INTRAVENOUS PRN
Status: DISCONTINUED | OUTPATIENT
Start: 2022-04-15 | End: 2022-04-15 | Stop reason: SDUPTHER

## 2022-04-15 RX ORDER — OXYCODONE HYDROCHLORIDE AND ACETAMINOPHEN 5; 325 MG/1; MG/1
1 TABLET ORAL EVERY 4 HOURS PRN
Status: DISCONTINUED | OUTPATIENT
Start: 2022-04-15 | End: 2022-04-16 | Stop reason: HOSPADM

## 2022-04-15 RX ORDER — LEVETIRACETAM 500 MG/1
1000 TABLET ORAL 2 TIMES DAILY
Status: DISCONTINUED | OUTPATIENT
Start: 2022-04-15 | End: 2022-04-16 | Stop reason: HOSPADM

## 2022-04-15 RX ORDER — SODIUM CHLORIDE, SODIUM LACTATE, POTASSIUM CHLORIDE, CALCIUM CHLORIDE 600; 310; 30; 20 MG/100ML; MG/100ML; MG/100ML; MG/100ML
INJECTION, SOLUTION INTRAVENOUS CONTINUOUS
Status: DISCONTINUED | OUTPATIENT
Start: 2022-04-15 | End: 2022-04-16 | Stop reason: HOSPADM

## 2022-04-15 RX ORDER — SODIUM CHLORIDE 0.9 % (FLUSH) 0.9 %
5-40 SYRINGE (ML) INJECTION EVERY 12 HOURS SCHEDULED
Status: DISCONTINUED | OUTPATIENT
Start: 2022-04-15 | End: 2022-04-16 | Stop reason: HOSPADM

## 2022-04-15 RX ORDER — CEFAZOLIN SODIUM 2 G/50ML
SOLUTION INTRAVENOUS PRN
Status: DISCONTINUED | OUTPATIENT
Start: 2022-04-15 | End: 2022-04-15 | Stop reason: SDUPTHER

## 2022-04-15 RX ORDER — CHOLECALCIFEROL (VITAMIN D3) 125 MCG
10 CAPSULE ORAL NIGHTLY PRN
Status: DISCONTINUED | OUTPATIENT
Start: 2022-04-15 | End: 2022-04-16 | Stop reason: HOSPADM

## 2022-04-15 RX ORDER — TIZANIDINE 4 MG/1
4 TABLET ORAL EVERY 8 HOURS PRN
Status: DISCONTINUED | OUTPATIENT
Start: 2022-04-15 | End: 2022-04-16 | Stop reason: HOSPADM

## 2022-04-15 RX ORDER — HEPARIN SODIUM (PORCINE) LOCK FLUSH IV SOLN 100 UNIT/ML 100 UNIT/ML
500 SOLUTION INTRAVENOUS PRN
Status: DISCONTINUED | OUTPATIENT
Start: 2022-04-15 | End: 2022-04-15 | Stop reason: HOSPADM

## 2022-04-15 RX ORDER — M-VIT,TX,IRON,MINS/CALC/FOLIC 27MG-0.4MG
1 TABLET ORAL DAILY
Status: DISCONTINUED | OUTPATIENT
Start: 2022-04-15 | End: 2022-04-16 | Stop reason: HOSPADM

## 2022-04-15 RX ORDER — FERROUS SULFATE 325(65) MG
325 TABLET ORAL
Status: DISCONTINUED | OUTPATIENT
Start: 2022-04-16 | End: 2022-04-16 | Stop reason: HOSPADM

## 2022-04-15 RX ORDER — ONDANSETRON 4 MG/1
4 TABLET, ORALLY DISINTEGRATING ORAL EVERY 8 HOURS PRN
Status: DISCONTINUED | OUTPATIENT
Start: 2022-04-15 | End: 2022-04-16 | Stop reason: HOSPADM

## 2022-04-15 RX ORDER — SCOLOPAMINE TRANSDERMAL SYSTEM 1 MG/1
1 PATCH, EXTENDED RELEASE TRANSDERMAL
Status: DISCONTINUED | OUTPATIENT
Start: 2022-04-15 | End: 2022-04-16 | Stop reason: HOSPADM

## 2022-04-15 RX ORDER — CLONIDINE HYDROCHLORIDE 0.1 MG/1
0.1 TABLET ORAL 2 TIMES DAILY
Status: DISCONTINUED | OUTPATIENT
Start: 2022-04-15 | End: 2022-04-16 | Stop reason: HOSPADM

## 2022-04-15 RX ORDER — PANTOPRAZOLE SODIUM 40 MG/1
40 TABLET, DELAYED RELEASE ORAL 2 TIMES DAILY
Status: DISCONTINUED | OUTPATIENT
Start: 2022-04-15 | End: 2022-04-16 | Stop reason: HOSPADM

## 2022-04-15 RX ORDER — SODIUM CHLORIDE 9 MG/ML
INJECTION, SOLUTION INTRAVENOUS PRN
Status: DISCONTINUED | OUTPATIENT
Start: 2022-04-15 | End: 2022-04-15 | Stop reason: HOSPADM

## 2022-04-15 RX ORDER — MIDAZOLAM HYDROCHLORIDE 1 MG/ML
INJECTION INTRAMUSCULAR; INTRAVENOUS PRN
Status: DISCONTINUED | OUTPATIENT
Start: 2022-04-15 | End: 2022-04-15 | Stop reason: SDUPTHER

## 2022-04-15 RX ORDER — ESTRADIOL 1 MG/1
0.5 TABLET ORAL DAILY
Status: DISCONTINUED | OUTPATIENT
Start: 2022-04-15 | End: 2022-04-16 | Stop reason: HOSPADM

## 2022-04-15 RX ORDER — LEVOTHYROXINE SODIUM 0.12 MG/1
125 TABLET ORAL DAILY
Status: DISCONTINUED | OUTPATIENT
Start: 2022-04-15 | End: 2022-04-16 | Stop reason: HOSPADM

## 2022-04-15 RX ORDER — ACETAMINOPHEN 325 MG/1
650 TABLET ORAL EVERY 6 HOURS PRN
Status: DISCONTINUED | OUTPATIENT
Start: 2022-04-15 | End: 2022-04-15

## 2022-04-15 RX ORDER — SODIUM CHLORIDE 0.9 % (FLUSH) 0.9 %
5-40 SYRINGE (ML) INJECTION PRN
Status: DISCONTINUED | OUTPATIENT
Start: 2022-04-15 | End: 2022-04-16 | Stop reason: HOSPADM

## 2022-04-15 RX ADMIN — SODIUM CHLORIDE, POTASSIUM CHLORIDE, SODIUM LACTATE AND CALCIUM CHLORIDE: 600; 310; 30; 20 INJECTION, SOLUTION INTRAVENOUS at 06:56

## 2022-04-15 RX ADMIN — ONDANSETRON HYDROCHLORIDE 4 MG: 4 INJECTION, SOLUTION INTRAMUSCULAR; INTRAVENOUS at 07:47

## 2022-04-15 RX ADMIN — SUGAMMADEX 50 MG: 100 INJECTION, SOLUTION INTRAVENOUS at 08:37

## 2022-04-15 RX ADMIN — GABAPENTIN 800 MG: 400 CAPSULE ORAL at 20:33

## 2022-04-15 RX ADMIN — PAROXETINE HYDROCHLORIDE 45 MG: 20 TABLET, FILM COATED ORAL at 20:32

## 2022-04-15 RX ADMIN — SODIUM CHLORIDE, PRESERVATIVE FREE 10 ML: 5 INJECTION INTRAVENOUS at 06:56

## 2022-04-15 RX ADMIN — MIDAZOLAM 2 MG: 1 INJECTION INTRAMUSCULAR; INTRAVENOUS at 09:05

## 2022-04-15 RX ADMIN — PANTOPRAZOLE SODIUM 40 MG: 40 TABLET, DELAYED RELEASE ORAL at 20:33

## 2022-04-15 RX ADMIN — LEVETIRACETAM 1000 MG: 500 TABLET, FILM COATED ORAL at 20:32

## 2022-04-15 RX ADMIN — Medication 10 MG: at 20:34

## 2022-04-15 RX ADMIN — OXYCODONE HYDROCHLORIDE AND ACETAMINOPHEN 1 TABLET: 7.5; 325 TABLET ORAL at 17:40

## 2022-04-15 RX ADMIN — HYDROMORPHONE HYDROCHLORIDE 1 MG: 1 INJECTION, SOLUTION INTRAMUSCULAR; INTRAVENOUS; SUBCUTANEOUS at 11:01

## 2022-04-15 RX ADMIN — PROPOFOL 150 MG: 10 INJECTION, EMULSION INTRAVENOUS at 07:47

## 2022-04-15 RX ADMIN — HYDROMORPHONE HYDROCHLORIDE 0.5 MG: 1 INJECTION, SOLUTION INTRAMUSCULAR; INTRAVENOUS; SUBCUTANEOUS at 09:21

## 2022-04-15 RX ADMIN — TIZANIDINE 4 MG: 4 TABLET ORAL at 20:33

## 2022-04-15 RX ADMIN — GABAPENTIN 800 MG: 400 CAPSULE ORAL at 13:31

## 2022-04-15 RX ADMIN — CEFAZOLIN SODIUM 2000 MG: 2 SOLUTION INTRAVENOUS at 07:49

## 2022-04-15 RX ADMIN — SODIUM CHLORIDE, POTASSIUM CHLORIDE, SODIUM LACTATE AND CALCIUM CHLORIDE: 600; 310; 30; 20 INJECTION, SOLUTION INTRAVENOUS at 11:00

## 2022-04-15 RX ADMIN — ROCURONIUM BROMIDE 50 MG: 10 INJECTION INTRAVENOUS at 07:47

## 2022-04-15 RX ADMIN — ONDANSETRON 4 MG: 2 INJECTION INTRAMUSCULAR; INTRAVENOUS at 18:48

## 2022-04-15 RX ADMIN — MIDAZOLAM 2 MG: 1 INJECTION INTRAMUSCULAR; INTRAVENOUS at 07:40

## 2022-04-15 RX ADMIN — HYDROMORPHONE HYDROCHLORIDE 1 MG: 1 INJECTION, SOLUTION INTRAMUSCULAR; INTRAVENOUS; SUBCUTANEOUS at 15:42

## 2022-04-15 RX ADMIN — PROMETHAZINE HYDROCHLORIDE 12.5 MG: 25 INJECTION, SOLUTION INTRAMUSCULAR; INTRAVENOUS at 11:21

## 2022-04-15 RX ADMIN — SUGAMMADEX 50 MG: 100 INJECTION, SOLUTION INTRAVENOUS at 08:35

## 2022-04-15 RX ADMIN — CLONIDINE HYDROCHLORIDE 0.1 MG: 0.1 TABLET ORAL at 20:33

## 2022-04-15 RX ADMIN — ONDANSETRON 4 MG: 2 INJECTION INTRAMUSCULAR; INTRAVENOUS at 13:31

## 2022-04-15 RX ADMIN — SUGAMMADEX 50 MG: 100 INJECTION, SOLUTION INTRAVENOUS at 08:34

## 2022-04-15 RX ADMIN — HYDROMORPHONE HYDROCHLORIDE 1 MG: 1 INJECTION, SOLUTION INTRAMUSCULAR; INTRAVENOUS; SUBCUTANEOUS at 20:33

## 2022-04-15 RX ADMIN — SUGAMMADEX 50 MG: 100 INJECTION, SOLUTION INTRAVENOUS at 08:32

## 2022-04-15 RX ADMIN — FENTANYL CITRATE 100 MCG: 50 INJECTION, SOLUTION INTRAMUSCULAR; INTRAVENOUS at 07:47

## 2022-04-15 RX ADMIN — OXYCODONE HYDROCHLORIDE AND ACETAMINOPHEN 1 TABLET: 7.5; 325 TABLET ORAL at 13:31

## 2022-04-15 ASSESSMENT — PULMONARY FUNCTION TESTS
PIF_VALUE: 35
PIF_VALUE: 0
PIF_VALUE: 34
PIF_VALUE: 1
PIF_VALUE: 34
PIF_VALUE: 36
PIF_VALUE: 35
PIF_VALUE: 26
PIF_VALUE: 35
PIF_VALUE: 34
PIF_VALUE: 33
PIF_VALUE: 34
PIF_VALUE: 19
PIF_VALUE: 34
PIF_VALUE: 35
PIF_VALUE: 6
PIF_VALUE: 32
PIF_VALUE: 34
PIF_VALUE: 35
PIF_VALUE: 34
PIF_VALUE: 1
PIF_VALUE: 33
PIF_VALUE: 1
PIF_VALUE: 1
PIF_VALUE: 35
PIF_VALUE: 35
PIF_VALUE: 3
PIF_VALUE: 33
PIF_VALUE: 35
PIF_VALUE: 32
PIF_VALUE: 35
PIF_VALUE: 35
PIF_VALUE: 1
PIF_VALUE: 35
PIF_VALUE: 20
PIF_VALUE: 36
PIF_VALUE: 35
PIF_VALUE: 34
PIF_VALUE: 34
PIF_VALUE: 35
PIF_VALUE: 35
PIF_VALUE: 1
PIF_VALUE: 30
PIF_VALUE: 1
PIF_VALUE: 41
PIF_VALUE: 34
PIF_VALUE: 34
PIF_VALUE: 35
PIF_VALUE: 34
PIF_VALUE: 35
PIF_VALUE: 1
PIF_VALUE: 27
PIF_VALUE: 34
PIF_VALUE: 34

## 2022-04-15 ASSESSMENT — PAIN SCALES - GENERAL
PAINLEVEL_OUTOF10: 0
PAINLEVEL_OUTOF10: 7
PAINLEVEL_OUTOF10: 10
PAINLEVEL_OUTOF10: 8

## 2022-04-15 ASSESSMENT — PAIN DESCRIPTION - ORIENTATION
ORIENTATION: LEFT
ORIENTATION: LEFT

## 2022-04-15 ASSESSMENT — PAIN DESCRIPTION - DESCRIPTORS
DESCRIPTORS: ACHING;BURNING
DESCRIPTORS: ACHING
DESCRIPTORS: BURNING

## 2022-04-15 ASSESSMENT — PAIN DESCRIPTION - LOCATION
LOCATION: FOOT
LOCATION: FOOT

## 2022-04-15 ASSESSMENT — PAIN DESCRIPTION - PAIN TYPE
TYPE: SURGICAL PAIN
TYPE: SURGICAL PAIN

## 2022-04-15 ASSESSMENT — COPD QUESTIONNAIRES: CAT_SEVERITY: MILD

## 2022-04-15 ASSESSMENT — PAIN DESCRIPTION - FREQUENCY
FREQUENCY: CONTINUOUS
FREQUENCY: CONTINUOUS

## 2022-04-15 ASSESSMENT — PAIN DESCRIPTION - ONSET: ONSET: ON-GOING

## 2022-04-15 ASSESSMENT — PAIN DESCRIPTION - PROGRESSION
CLINICAL_PROGRESSION: RAPIDLY WORSENING
CLINICAL_PROGRESSION: GRADUALLY IMPROVING
CLINICAL_PROGRESSION: NOT CHANGED

## 2022-04-15 ASSESSMENT — PAIN - FUNCTIONAL ASSESSMENT
PAIN_FUNCTIONAL_ASSESSMENT: 0-10
PAIN_FUNCTIONAL_ASSESSMENT: ACTIVITIES ARE NOT PREVENTED

## 2022-04-15 NOTE — OP NOTE
Located within [facility]                  [address] [facility], [address] [facility]                                OPERATIVE REPORT    PATIENT NAME: Godfrey Mcwilliams                  :        1970  MED REC NO:   [id number]                          ROOM:       004  ACCOUNT NO:   [de-identified]                           ADMIT DATE: 04/15/2022  PROVIDER:     Gino Mills DPM    DATE OF PROCEDURE:  04/15/2022    ATTENDING PROVIDER:  Gino Mills DPM    PREOPERATIVE DIAGNOSIS:  Abscess associated to the left plantar foot. POSTOPERATIVE DIAGNOSES:  Abscess to the plantar left foot and  ganglion-type cyst associated to the left plantar foot. ANESTHESIA:  General.    HEMOSTASIS:  Pneumatic ankle tourniquet set at 250 mmHg. ESTIMATED BLOOD LOSS:  Minimal.    MATERIALS:  None. SPECIMENS SENT:  Culture with swab culture both culture and sensitivity  x2. Specimen culture #1 and specimen culture #2, there are two  different swabs that I took and then also for Pathology we sent the  ganglion cyst.    COMPLICATIONS:  None. CONDITION:  Stable. INDICATIONS FOR THE PROCEDURE:  The patient is a pleasant 47-year-old  female that has been seen in the office for quite some time and has had  continued pain and some swelling and an odd appearance to the plantar  aspect of the left foot. The patient has actually been to the ER a  couple of times for this, has been on antibiotics, and we got an MRI,  which showed an abscess associated to the site. The patient at this  time subsequently was given the option of living with it, doing further  conservative therapy, and surgical intervention. I went over all risks,  benefits, and complications of the procedure in detail. The patient at  this point understands that there are no guarantees and wishes to  proceed. Consent was signed and submitted to chart.     DESCRIPTION OF PROCEDURE:  Under mild sedation, the patient was brought  to the OR and placed on the operating room table in a supine position. Following the induction of the general anesthesia, the left foot was  prepped, scrubbed, and draped in normal aseptic technique. Attention  was then directed to the plantar left foot where an incision was made  over the region just proximal to the weightbearing surface of the first  metatarsal.  This incision was carried down through the skin and  subcutaneous layer with care to cauterize all bleeders. There was some  very clear drainage and there was a cyst there that contained clear  drainage. We cultured it in specimens #1 and #2 on the swab culture and  then we made the incision further to about 3 cm and dissected out rather  a large cyst and sent this to Pathology for evaluation. We then  irrigated the region with 3 liters of normal sterile saline with 80 mg  of gentamicin per 1000 and then three more regular liters of the saline. We then did closure partially with 4-0 Vicryl and then 4-0 nylon and we  did leave a little _____ packing to the area that they will change. The  patient at this time was then dressed with 4 x 4's, Kerlix, and an Ace  in the packing. The patient tolerated the procedure and anesthesia well  and left the OR with vital signs stable and vascular status intact to  the left foot. The patient will be seen in our office next week for a  followup appointment. She is to be strict 100% nonweightbearing. She  has Percocet 5 mg one every six hours for pain. She has Augmentin 500  mg b.i.d. for 14 days. The patient has home health on board as well and  we will give her a courtesy call on Sunday to check on her and see how  she is doing and we shall go from there.         Christiano Horan DPM    D: 04/15/2022 8:59:07       T: 04/15/2022 10:33:39     [email or web address]  Job#: [id number]     Doc#: [id number]    CC:

## 2022-04-15 NOTE — H&P
History and Physical      Name:  Alexia Selby /Age/Sex: 1970  (46 y.o. female)   MRN & CSN:  [id number] & [id number] Admission Date/Time: 4/15/2022  6:12 AM   Location:  [address] [place] [address] PCP: Jluis Estevez MD, MD       Hospital Day: 1    Assessment and Plan:   Alexia Selby is a 46 y.o.  female  who presents with Pseudoseizures ([address]    Seizure, post op r/t pain and nausea. IV fluids, pain management, antiemetics, seizure precautions, continue Keppra as at home. 2.   Pre-Op Diagnosis: Abscess of bursa, left ankle and foot Post-Op Diagnosis: Cyst noted to the region and sent to pathology  3. Hypertension, will continue current home medications  4. Hypothyroidism, continue current home medications  5. History of sleeve gastrectomy, continue current vitamins  6. GERD, continue Protonix  7. Chronic pain, she does see pain management for this. Diet ADULT DIET; Regular; Low Fat/Low Chol/High Fiber/BRIGHT   DVT Prophylaxis [] Lovenox, []  Heparin, [] SCDs, [x] Ambulation   GI Prophylaxis [x] PPI,  [] H2 Blocker,  [] Carafate,  [] Diet/Tube Feeds   Code Status Full Code             History of Present Illness:     Chief Complaint: Pseudoseizures ([address]  Selma Silverio is a 57-year-old female who is here today having surgery on her left foot, when she awoke after surgery she complained of nausea and then she had a seizure. She does have a past medical history of pseudoseizures, she states these occur when she is in pain or has nausea. She also has past medical history of hypertension, hypothyroidism, history of a sleeve gastrectomy, GERD, chronic pain syndrome, chronic back pain, anxiety, arthritis, depression, fibromyalgia, chronic pancreatitis, and multiple infections in the left foot. She has been admitted to observation status for overnight monitoring for seizures, inpatient pain control, and inpatient nausea control.        Ten point ROS reviewed negative, unless as noted above    Objective: Intake/Output Summary (Last 24 hours) at 4/15/2022 1051  Last data filed at 4/15/2022 0844  Gross per 24 hour   Intake 550 ml   Output --   Net 550 ml      Vitals:   Vitals:    04/15/22 0945   BP:    Pulse:    Resp: 16   Temp: 97.8 °F (36.6 °C)   SpO2:      Physical Exam:   GEN Awake female, sitting upright in bed in no apparent distress. Appears given age. EYES Pupils are equally round. No scleral erythema, discharge, or conjunctivitis. HENT Mucous membranes are moist. Oral pharynx without exudates, no evidence of thrush. Poor dentition missing teeth  NECK Supple, no apparent thyromegaly or masses. RESP Clear to auscultation, no wheezes, rales or rhonchi. Symmetric chest movement while on room air. CARDIO/VASC S1/S2 auscultated. Regular rate  Peripheral pulses equal bilaterally and palpable. No peripheral edema. GI Abdomen is soft without significant tenderness, masses, or guarding. Bowel sounds are normoactive. Rectal exam deferred.  No costovertebral angle tenderness. MSK No gross joint deformities. SKIN Normal coloration, warm, dry. Dressing clean dry and intact left foot  NEURO Cranial nerves appear grossly intact, normal speech, no lateralizing weakness. PSYCH Awake, alert, oriented x 4. Affect appropriate. Past Medical History:      Past Medical History:   Diagnosis Date    Acid reflux     Anemia     Anxiety     Arthritis     Hands, Back And Ankles    Bleeding ulcer 2014    \"I Had Ulcers In My Stomach And Colon\"/ per pt on 8/12/2019\"they said recently having some blood in my stomach- in July ( 2019)could not find where coming from \"    Bronchitis Last Episode 2014    Chronic back pain     Chronic pain     Sees Dr. Maycol Ren COPD (chronic obstructive pulmonary disease) ([facility]     Sees Dr. Laura Boss Depression     Fibromyalgia Dx 2013    GERD (gastroesophageal reflux disease)     H/O echocardiogram 08/11/2015    EF >55%.  LA to be at the upper limit of normal in size. LV hypertrophy with normal LV systolic, but abnormal diastolic function. Normal valvular structures and function.  H/O echocardiogram 2018    EF 55-60%    Hiatal hernia     History of blood transfusion 2015 And     No Reaction To Blood Transfusions Received    History of sleeve gastrectomy     [place] (hard of hearing)     Right Ear    Hx of cardiac catheterization 10/24/2010    Angiographically normal coronary arteries w/ normal LV function and wall motion.  Hx of transesophageal echocardiography (PILO) for monitoring 2010    EF 55-60%. WNL. 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 name   Jesus Sic cysts     \"they found that I have a kidney cyst but not sure which side\" per pt on 7/15/2020    Lupus ([address] Dx     \"Rheumatoid Lupus\"    MDRO (multiple drug resistant organisms) resistance     4 months ago chest from mediport    Morbid obesity ([address]     MRSA bacteremia     Nausea     \"Most Of The Time\"    Osteomyelitis of fifth toe of left foot ([address]     Pain management     Sees Dr. Kendall Messer, Once A Month    Pancreatitis chronic Dx     Pneumonia Dx 11-15    Seizures ([address]     Shortness of breath on exertion     Shortness of breath on exertion     Sleep apnea     Has CPAP\"no longer use the cpap since lost weight\"    Staph infection Dx -15    Left Foot    Staph infection Dx -15    \"Left Foot\"    Teeth missing     Upper And Lower    Thyroid disease     Wears glasses     To Read     PSHX:  has a past surgical history that includes Lung removal, partial (Left, );  section (1991); Foot surgery (Left, Last Done In );  Dental surgery; Cholecystectomy, laparoscopic (0591'B); other surgical history (02/1998); other surgical history (Last Done 7-15-16); Tonsillectomy and adenoidectomy (1975); Appendectomy (02/1998); Upper gastrointestinal endoscopy (08/27/2018); Lap Band (~2000); Hysterectomy (10/1997); Endoscopy, colon, diagnostic (Last Done In 2018); Colonoscopy (Last Done In 2000's); Cardiac catheterization (10/24/2010); Sleeve Gastrectomy (N/A, 2/12/2019); hiatal hernia repair (N/A, 2/12/2019); Upper gastrointestinal endoscopy (N/A, 4/2/2019); Foot Debridement (Left, 6/16/2019); Upper gastrointestinal endoscopy (N/A, 6/19/2019); Catheter Removal (N/A, 7/16/2019); Upper gastrointestinal endoscopy (N/A, 7/22/2019); INSERTION / REMOVAL / REPLACEMENT VENOUS ACCESS CATHETER (N/A, 8/15/2019); Upper gastrointestinal endoscopy (N/A, 10/14/2019); [address] Surgery (N/A, 1/15/2020); [address] Surgery (N/A, 7/6/2020); Upper gastrointestinal endoscopy (N/A, 7/17/2020); Leg biopsy excision (Left, 3/8/2021); Upper gastrointestinal endoscopy (N/A, 5/28/2021); Port Surgery (Left, 8/3/2021); and Jet-en-Y Gastric Bypass (N/A, 8/16/2021). Allergies: Allergies   Allergen Reactions    Aspirin Palpitations     \"My Heart Rate Elevates\"    Compazine [Prochlorperazine] Rash    Shellfish-Derived Products Swelling    Toradol [Ketorolac Tromethamine] Rash    Haldol [Haloperidol]      Shaking      Reglan [Metoclopramide] Itching       FAM HX: family history includes Arthritis in her mother; Asthma in her father and son; Cancer in her father; Diabetes in her father and mother; Heart Disease in her mother; High Blood Pressure in her brother, father, and mother; High Cholesterol in her mother; Lupus in her daughter; Obesity in her mother; Other in her daughter; Vision Loss in her mother and son.   Soc HX:   Social History     Socioeconomic History    Marital status:      Spouse name: None    Number of children: None    Years of education: None    Highest education level: None   Occupational History    None   Tobacco Use    Smoking status: Never Smoker    Smokeless tobacco: Never Used   Vaping Use    Vaping Use: Never used   Substance and Sexual Activity    Alcohol use: No     Alcohol/week: 0.0 standard drinks    Drug use: No    Sexual activity: Not Currently   Other Topics Concern    None   Social History Narrative    None     Social Determinants of Health     Financial Resource Strain: Low Risk     Difficulty of Paying Living Expenses: Not hard at all   Food Insecurity: No Food Insecurity    Worried About Running Out of Food in the Last Year: Never true    Tyrese of Food in the Last Year: Never true   Transportation Needs:     Lack of Transportation (Medical): Not on file    Lack of Transportation (Non-Medical):  Not on file   Physical Activity:     Days of Exercise per Week: Not on file    Minutes of Exercise per Session: Not on file   Stress:     Feeling of Stress : Not on file   Social Connections:     Frequency of Communication with Friends and Family: Not on file    Frequency of Social Gatherings with Friends and Family: Not on file    Attends Confucianism Services: Not on file    Active Member of [address] [place] [address] or Organizations: Not on file    Attends Club or Organization Meetings: Not on file    Marital Status: Not on file   Intimate Partner Violence:     Fear of Current or Ex-Partner: Not on file    Emotionally Abused: Not on file    Physically Abused: Not on file    Sexually Abused: Not on file   Housing Stability:     Unable to Pay for Housing in the Last Year: Not on file    Number of [place] in the Last Year: Not on file    Unstable Housing in the Last Year: Not on file       Medications:   Medications:    promethazine (PHENERGAN) in sodium chloride 0.9% IVPB  12.5 mg IntraVENous Once    sodium chloride flush  5-40 mL IntraVENous 2 times per day    scopolamine  1 patch TransDERmal Q72H    PARoxetine  45 mg Oral Nightly    pantoprazole 40 mg Oral BID    MVI (BARIATRIC ADVANTAGE MULTIFORMULA) chew tab  1 tablet Oral Daily    levothyroxine  125 mcg Oral Daily    levETIRAcetam  1,000 mg Oral BID    gabapentin  800 mg Oral TID    [START ON 4/16/2022] ferrous sulfate  325 mg Oral Daily with breakfast    estradiol  0.5 mg Oral Daily    cloNIDine  0.1 mg Oral BID    calcium citrate+Vit D(BARIATRIC ADVANTAGE)chew tab  1 tablet Oral BID    atenolol  25 mg Oral Daily    Vitamin D3  1 tablet Oral Daily      Infusions:    lactated ringers 100 mL/hr at 04/15/22 0740    sodium chloride       PRN Meds: sodium chloride flush, 5-40 mL, PRN  sodium chloride, , PRN  ondansetron, 4 mg, Q8H PRN   Or  ondansetron, 4 mg, Q6H PRN  polyethylene glycol, 17 g, Daily PRN  HYDROmorphone, 1 mg, Q4H PRN  oxyCODONE-acetaminophen, 1 tablet, Q4H PRN  oxyCODONE-acetaminophen, 1 tablet, Q4H PRN  acetaminophen, 650 mg, Q6H PRN   Or  acetaminophen, 650 mg, Q6H PRN  Melatonin, 10 mg, Nightly PRN          Electronically signed by MICHAEL Bingham NP on 4/15/2022 at 10:51 AM

## 2022-04-15 NOTE — PROGRESS NOTES
Pt admitted to unit post I&D of L foot. Not to remove dressing per Dr. Nora Sanchez. He will assess post discharge. Pt is NWB to L foot. Discharge information from Dr. Nora Sanchez is in pt's paper chart. Dressing to L foot in clean dry and intact. Wound is already aware and [facility]HC will be following  On discharge.

## 2022-04-15 NOTE — CARE COORDINATION
CM met with the patient for discharge planning. Patient stated that she will be staying at her brother's home in [place] following discharge. Patient has insurance with Rx coverage & PCP, stated that she is independent with ADL's, and is able to drive. Patient stated that she does not require the use of any assistive devices or home oxygen but does use an inhaler as needed. Patient stated that Dr. Cory Cullen advised her that his [facility] but she is unsure which [place] [id number] agency. Patient is unable to identify any needs at this time and hopes to be discharged Saturday morning (4/16). CM available if needs arise. 11:37 AM  CM notified by Paul Mccarthy RN that [place] [id number] services were arranged through [facility] by Dr. Cory Cullen' office.

## 2022-04-15 NOTE — ANESTHESIA POSTPROCEDURE EVALUATION
Department of Anesthesiology  Postprocedure Note    Patient: Mague Lowery  MRN: [id number]  YOB: 1970  Date of evaluation: 4/15/2022  Time:  10:14 AM     Procedure Summary     Date: 04/15/22 Room / Location: [address] [place] [address] 01 / [facility]    Anesthesia Start: 0740 Anesthesia Stop: 0845    Procedure: LEFT FOOT ABSCESS DEBRIDEMENT INCISION AND DRAINAGE, CYST REMOVAL (Left Foot) Diagnosis:       Abscess of bursa, left ankle and foot      Left foot pain      (Abscess of bursa, left ankle and foot [M71.072] Left foot pain [M79.672])    Surgeons: Alvaro Allen DPM Responsible Provider: MICHAEL Garcia CRNA    Anesthesia Type: general ASA Status: 4          Anesthesia Type: general    Anay Phase I: Anay Score: 8    Anay Phase II:      Last vitals: Reviewed and per EMR flowsheets.        Anesthesia Post Evaluation    Patient location during evaluation: PACU  Patient participation: complete - patient participated  Level of consciousness: awake, awake and alert and sleepy but conscious  Pain score: 5  Airway patency: patent  Nausea & Vomiting: nausea and no vomiting (pt baseline nauseated )  Complications: no  Cardiovascular status: blood pressure returned to baseline  Respiratory status: acceptable and room air  Hydration status: euvolemic

## 2022-04-15 NOTE — CARE COORDINATION
NURSING: Dr. Asaf Hamilton' office has arranged [facility] services for the patient through [facility]. At discharge please fax the discharge summary (if available) and the patient's discharge instructions to [facility] at [phone]. Please also call [facility] at [phone] to notify of the patient's discharge.

## 2022-04-15 NOTE — PROGRESS NOTES
0900-Pt. Is alert and oriented. Pt. States that she's having some nausea and is requesting phenergan. Pt. Begins to spit. Pt. Also states she is having a \"burning\" sensation in her left foot. I sat pt. Up, placed an alcohol pad under the pt.'s nose and encouraged her to take some slow, deep breaths. IV fluids are wide-open. Pt. Has scopolamine patch on.   0903-Pt. Begins shaking hands and moving down in the bed. Pt. Pulled up with assistance by margoth Hurt RN) and Zena Barfield CRNA. Pulled pt. Over on her left side. Suction hooked up and turned on, in case it was needed. Pt. Has bilateral hands clenched into fists. Pt. Is on the monitor. Saad Magaña went to get Versed from the OR omnicel. 0905-Versed given by Zena Barfield CRNA-see MAR.  0907-Pt. Turned onto back. Pt. Is drowsy. VS remained stable throughout event, see flowsheet. 0915-Pt. Alert and oriented and answering questions. Pt. States, \"sorry I'm being such a pain. \" Pt. C/o nausea and a burning pain in her left foot. Pt. States that Dilaudid or Morphine usually helps her pain. Pt. States that she has taken those for her pancreatitis \"attacks\" in the past. VS stable. Will ask Dr. Chi Blackman about an order for Dilaudid.

## 2022-04-15 NOTE — PROGRESS NOTES
Pt. Arrived in PACU via cart from the OR. Attached to monitor, vs stable. Pt. Placed on oxygen via simple mask at 6L. Pt. Nahid Malik, but arouses to voice and touch. Denies pain or nausea at this time. Brakes applied, side rails up x 2. Dressing to left foot is dry/intact, no drainage noted. SCD to RLE. IV infusing without difficulty. Bedside report received from Melba Caro RN and Christina Matute CRNA. Will continue to monitor via bedside.

## 2022-04-15 NOTE — H&P
..The patient was counseled at length about the risks of roxy Covid-19 during their perioperative period and any recovery window from their procedure. The patient was made aware that roxy Covid-19  may worsen their prognosis for recovering from their procedure  and lend to a higher morbidity and/or mortality risk. All material risks, benefits, and reasonable alternatives including postponing the procedure were discussed. The patient does wish to proceed with the procedure at this time.

## 2022-04-15 NOTE — H&P
..Update History & Physical    The patient's History and Physical of April 6, 2022 was reviewed with the patient and I examined the patient. There was no change. The surgical site was confirmed by the patient and me. Plan: The risks, benefits, expected outcome, and alternative to the recommended procedure have been discussed with the patient. Patient understands and wants to proceed with the procedure.      Electronically signed by Bri Gary DPM on 4/15/2022 at 7:25 AM

## 2022-04-15 NOTE — PROGRESS NOTES
Pt. Is awake, alert, and oriented x 4. On room air, vs stable. Pt. Was given Dilaudid, 0.5mg, @ 0921, see MAR. Pt. States her pain is getting a little better. She rates it at 7/10 now. Dressing is dry/intact to left foot. No drainage noted. IV infusing without difficulty. Report was called to Anoop Narvaez RN, on the inpatient unit. Pt. Will be transported via cart to room 4.

## 2022-04-15 NOTE — BRIEF OP NOTE
Brief Postoperative Note      Patient: Ana Paula Lincoln  YOB: 1970  MRN: [id number]    Date of Procedure: 4/15/2022    Pre-Op Diagnosis: Abscess of bursa, left ankle and foot [M71.072] Left foot pain [M79.672]    Post-Op Diagnosis: Cyst noted to the region and sent to pathology       Procedure(s):  LEFT FOOT ABSCESS DEBRIDEMENT INCISION AND DRAINAGE    Surgeon(s):  Ijeoma Mar DPM    Assistant:  * No surgical staff found *    Anesthesia: General    Estimated Blood Loss (mL): Minimal    Complications: None    Specimens:   ID Type Source Tests Collected by Time Destination   1 : LEFT FOOT CULTURE 1 Tissue Tissue CULTURE, SURGICAL Ijeoma Mar DPM 4/15/2022 0806    2 : LEFT FOOT CULTURE 2 Tissue Tissue CULTURE, SURGICAL Ijeoma Mar DPM 4/15/2022 0809    A : LEFT FOOT CYST Specimen Cyst SURGICAL PATHOLOGY Ijeoma Mar DPM 4/15/2022 0810        Implants:  * No implants in log *      Drains: * No LDAs found *    Findings: as expected    Electronically signed by Ijeoma Mar DPM on 4/15/2022 at 8:44 AM

## 2022-04-16 VITALS
SYSTOLIC BLOOD PRESSURE: 111 MMHG | HEIGHT: 64 IN | HEART RATE: 61 BPM | RESPIRATION RATE: 18 BRPM | TEMPERATURE: 97.3 F | WEIGHT: 238 LBS | DIASTOLIC BLOOD PRESSURE: 69 MMHG | BODY MASS INDEX: 40.63 KG/M2 | OXYGEN SATURATION: 95 %

## 2022-04-16 LAB
ANION GAP SERPL CALCULATED.3IONS-SCNC: 6 MMOL/L (ref 4–16)
BASOPHILS ABSOLUTE: 0 K/CU MM
BASOPHILS RELATIVE PERCENT: 0.8 % (ref 0–1)
BUN BLDV-MCNC: 11 MG/DL (ref 6–23)
CALCIUM SERPL-MCNC: 9.6 MG/DL (ref 8.3–10.6)
CHLORIDE BLD-SCNC: 106 MMOL/L (ref 99–110)
CO2: 27 MMOL/L (ref 21–32)
CREAT SERPL-MCNC: 0.7 MG/DL (ref 0.6–1.1)
DIFFERENTIAL TYPE: ABNORMAL
EOSINOPHILS ABSOLUTE: 0.2 K/CU MM
EOSINOPHILS RELATIVE PERCENT: 5.2 % (ref 0–3)
GFR AFRICAN AMERICAN: >60 ML/MIN/1.73M2
GFR NON-AFRICAN AMERICAN: >60 ML/MIN/1.73M2
GLUCOSE BLD-MCNC: 91 MG/DL (ref 70–99)
HCT VFR BLD CALC: 32.4 % (ref 37–47)
HEMOGLOBIN: 10.2 GM/DL (ref 12.5–16)
IMMATURE NEUTROPHIL %: 0.3 % (ref 0–0.43)
LYMPHOCYTES ABSOLUTE: 1.3 K/CU MM
LYMPHOCYTES RELATIVE PERCENT: 35.2 % (ref 24–44)
MCH RBC QN AUTO: 26.9 PG (ref 27–31)
MCHC RBC AUTO-ENTMCNC: 31.5 % (ref 32–36)
MCV RBC AUTO: 85.5 FL (ref 78–100)
MONOCYTES ABSOLUTE: 0.3 K/CU MM
MONOCYTES RELATIVE PERCENT: 7.7 % (ref 0–4)
PDW BLD-RTO: 13.2 % (ref 11.7–14.9)
PLATELET # BLD: 164 K/CU MM (ref 140–440)
PMV BLD AUTO: 10.2 FL (ref 7.5–11.1)
POTASSIUM SERPL-SCNC: 4.4 MMOL/L (ref 3.5–5.1)
RBC # BLD: 3.79 M/CU MM (ref 4.2–5.4)
SEGMENTED NEUTROPHILS ABSOLUTE COUNT: 1.9 K/CU MM
SEGMENTED NEUTROPHILS RELATIVE PERCENT: 50.8 % (ref 36–66)
SODIUM BLD-SCNC: 139 MMOL/L (ref 135–145)
TOTAL IMMATURE NEUTOROPHIL: 0.01 K/CU MM
WBC # BLD: 3.6 K/CU MM (ref 4–10.5)

## 2022-04-16 PROCEDURE — 88304 TISSUE EXAM BY PATHOLOGIST: CPT | Performed by: PATHOLOGY

## 2022-04-16 PROCEDURE — 85025 COMPLETE CBC W/AUTO DIFF WBC: CPT

## 2022-04-16 PROCEDURE — 36591 DRAW BLOOD OFF VENOUS DEVICE: CPT

## 2022-04-16 PROCEDURE — 6370000000 HC RX 637 (ALT 250 FOR IP): Performed by: NURSE PRACTITIONER

## 2022-04-16 PROCEDURE — 96376 TX/PRO/DX INJ SAME DRUG ADON: CPT

## 2022-04-16 PROCEDURE — 94761 N-INVAS EAR/PLS OXIMETRY MLT: CPT

## 2022-04-16 PROCEDURE — 36415 COLL VENOUS BLD VENIPUNCTURE: CPT

## 2022-04-16 PROCEDURE — 2580000003 HC RX 258: Performed by: NURSE PRACTITIONER

## 2022-04-16 PROCEDURE — 80048 BASIC METABOLIC PNL TOTAL CA: CPT

## 2022-04-16 PROCEDURE — 6360000002 HC RX W HCPCS: Performed by: NURSE PRACTITIONER

## 2022-04-16 PROCEDURE — G0378 HOSPITAL OBSERVATION PER HR: HCPCS

## 2022-04-16 PROCEDURE — 2580000003 HC RX 258: Performed by: PODIATRIST

## 2022-04-16 RX ORDER — IBUPROFEN 800 MG/1
800 TABLET ORAL
Qty: 30 TABLET | Refills: 0 | Status: ON HOLD | OUTPATIENT
Start: 2022-04-16 | End: 2022-05-02 | Stop reason: HOSPADM

## 2022-04-16 RX ORDER — OXYCODONE HYDROCHLORIDE AND ACETAMINOPHEN 5; 325 MG/1; MG/1
1 TABLET ORAL EVERY 6 HOURS PRN
Qty: 12 TABLET | Refills: 0 | Status: SHIPPED | OUTPATIENT
Start: 2022-04-16 | End: 2022-04-19

## 2022-04-16 RX ORDER — HEPARIN SODIUM (PORCINE) LOCK FLUSH IV SOLN 100 UNIT/ML 100 UNIT/ML
500 SOLUTION INTRAVENOUS ONCE
Status: COMPLETED | OUTPATIENT
Start: 2022-04-16 | End: 2022-04-16

## 2022-04-16 RX ORDER — IBUPROFEN 400 MG/1
800 TABLET ORAL ONCE
Status: COMPLETED | OUTPATIENT
Start: 2022-04-16 | End: 2022-04-16

## 2022-04-16 RX ORDER — AMOXICILLIN AND CLAVULANATE POTASSIUM 500; 125 MG/1; MG/1
1 TABLET, FILM COATED ORAL 2 TIMES DAILY
Qty: 20 TABLET | Refills: 0 | Status: SHIPPED | OUTPATIENT
Start: 2022-04-16 | End: 2022-04-26

## 2022-04-16 RX ORDER — SODIUM CHLORIDE, SODIUM LACTATE, POTASSIUM CHLORIDE, CALCIUM CHLORIDE 600; 310; 30; 20 MG/100ML; MG/100ML; MG/100ML; MG/100ML
200 INJECTION, SOLUTION INTRAVENOUS ONCE
Status: COMPLETED | OUTPATIENT
Start: 2022-04-16 | End: 2022-04-16

## 2022-04-16 RX ORDER — PROMETHAZINE HYDROCHLORIDE 25 MG/1
25 TABLET ORAL EVERY 8 HOURS PRN
Qty: 30 TABLET | Refills: 0 | Status: SHIPPED | OUTPATIENT
Start: 2022-04-16 | End: 2022-04-26

## 2022-04-16 RX ADMIN — CHOLECALCIFEROL TAB 125 MCG (5000 UNIT) 5000 UNITS: 125 TAB at 07:50

## 2022-04-16 RX ADMIN — GABAPENTIN 800 MG: 400 CAPSULE ORAL at 07:50

## 2022-04-16 RX ADMIN — IBUPROFEN 800 MG: 400 TABLET, FILM COATED ORAL at 11:52

## 2022-04-16 RX ADMIN — FERROUS SULFATE TAB 325 MG (65 MG ELEMENTAL FE) 325 MG: 325 (65 FE) TAB at 07:50

## 2022-04-16 RX ADMIN — SODIUM CHLORIDE, POTASSIUM CHLORIDE, SODIUM LACTATE AND CALCIUM CHLORIDE 200 ML: 600; 310; 30; 20 INJECTION, SOLUTION INTRAVENOUS at 05:15

## 2022-04-16 RX ADMIN — ESTRADIOL 0.5 MG: 1 TABLET ORAL at 08:13

## 2022-04-16 RX ADMIN — LEVOTHYROXINE SODIUM 125 MCG: 0.12 TABLET ORAL at 07:50

## 2022-04-16 RX ADMIN — PANTOPRAZOLE SODIUM 40 MG: 40 TABLET, DELAYED RELEASE ORAL at 07:50

## 2022-04-16 RX ADMIN — OXYCODONE HYDROCHLORIDE AND ACETAMINOPHEN 1 TABLET: 7.5; 325 TABLET ORAL at 07:50

## 2022-04-16 RX ADMIN — ONDANSETRON 4 MG: 2 INJECTION INTRAMUSCULAR; INTRAVENOUS at 03:55

## 2022-04-16 RX ADMIN — SODIUM CHLORIDE, POTASSIUM CHLORIDE, SODIUM LACTATE AND CALCIUM CHLORIDE: 600; 310; 30; 20 INJECTION, SOLUTION INTRAVENOUS at 04:51

## 2022-04-16 RX ADMIN — HYDROMORPHONE HYDROCHLORIDE 1 MG: 1 INJECTION, SOLUTION INTRAMUSCULAR; INTRAVENOUS; SUBCUTANEOUS at 03:55

## 2022-04-16 RX ADMIN — LEVETIRACETAM 1000 MG: 500 TABLET, FILM COATED ORAL at 07:50

## 2022-04-16 RX ADMIN — Medication 1 TABLET: at 07:50

## 2022-04-16 RX ADMIN — HEPARIN 500 UNITS: 100 SYRINGE at 12:27

## 2022-04-16 ASSESSMENT — PAIN DESCRIPTION - DIRECTION: RADIATING_TOWARDS: N/

## 2022-04-16 ASSESSMENT — PAIN DESCRIPTION - ONSET
ONSET: ON-GOING

## 2022-04-16 ASSESSMENT — PAIN SCALES - GENERAL
PAINLEVEL_OUTOF10: 8
PAINLEVEL_OUTOF10: 6
PAINLEVEL_OUTOF10: 8
PAINLEVEL_OUTOF10: 3
PAINLEVEL_OUTOF10: 6

## 2022-04-16 ASSESSMENT — PAIN DESCRIPTION - PAIN TYPE
TYPE: SURGICAL PAIN
TYPE: CHRONIC PAIN
TYPE: SURGICAL PAIN

## 2022-04-16 ASSESSMENT — PAIN DESCRIPTION - LOCATION
LOCATION: FOOT;HAND
LOCATION: FOOT
LOCATION: FOOT
LOCATION: HAND
LOCATION: FOOT

## 2022-04-16 ASSESSMENT — PAIN - FUNCTIONAL ASSESSMENT
PAIN_FUNCTIONAL_ASSESSMENT: ACTIVITIES ARE NOT PREVENTED

## 2022-04-16 ASSESSMENT — PAIN DESCRIPTION - ORIENTATION
ORIENTATION: LEFT
ORIENTATION: LEFT
ORIENTATION: LEFT;RIGHT
ORIENTATION: LEFT
ORIENTATION: LEFT

## 2022-04-16 ASSESSMENT — PAIN DESCRIPTION - PROGRESSION
CLINICAL_PROGRESSION: NOT CHANGED

## 2022-04-16 ASSESSMENT — PAIN DESCRIPTION - FREQUENCY
FREQUENCY: CONTINUOUS

## 2022-04-16 ASSESSMENT — PAIN DESCRIPTION - DESCRIPTORS
DESCRIPTORS: ACHING
DESCRIPTORS: ACHING;BURNING
DESCRIPTORS: ACHING
DESCRIPTORS: ACHING
DESCRIPTORS: ACHING;TENDER

## 2022-04-16 NOTE — PROGRESS NOTES
Reviewed discharge instructions with the patient at the bedside. Patient received her paper prescriptions and how to take medications were discussed. Patient's port was flushed with heparin and de-accessed with bandage applied over the site. All questions were answered and patient's ride is coming to pick her up after 1pm.   Discharge instructions and discharge summary were sent to [facility] to begin seeing patient.      1319: Patient taken out to the car via wheelchair

## 2022-04-16 NOTE — DISCHARGE SUMMARY
Discharge Summary    Name:  Rose Mary Fields /Age/Sex: 1970  (46 y.o. female)   MRN & CSN:  [id number] & [id number] Admission Date/Time: 4/15/2022  6:12 AM   Attending:  Janes Chester MD Discharging Physician: MICHAEL Carlson NP     Hospital Course:   Rose Mary Fields is a 46 y.o.  female  who presents with Pseudoseizures ([address]     Ayanna Siegel is a 53-year-old female who is here today having surgery on her left foot, when she awoke after surgery she complained of nausea and then she had a seizure. She does have a past medical history of pseudoseizures, she states these occur when she is in pain or has nausea. She also has past medical history of hypertension, hypothyroidism, history of a sleeve gastrectomy, GERD, chronic pain syndrome, chronic back pain, anxiety, arthritis, depression, fibromyalgia, chronic pancreatitis, and multiple infections in the left foot. She has been admitted to observation status for overnight monitoring for seizures, inpatient pain control, and inpatient nausea control. No further true seizure activity, during episode last night pt was alert oriented, talking and requesting pain medications. Will d/c home. Home health has be consulted and set up by Dr Reji Hall office. 1.  Pseudoseizures, Seizure, post op r/t pain and nausea. Resolved, continue Keppra  2. Pre-Op Diagnosis: Abscess of bursa, left ankle and foot Post-Op Diagnosis: Cyst noted to the region and sent to pathology  3. Hypertension, will continue current home medications  4. Hypothyroidism, continue current home medications  5. History of sleeve gastrectomy, continue current vitamins  6. GERD, continue Protonix  7. Nausea, continue home phenergan and Zofran  8. Chronic pain, she does see pain management for this. Dr. Reji Hall' office has arranged [facility] services for the patient through [facility].     The patient expressed appropriate understanding of and agreement with the discharge recommendations, medications, and plan. Consults this admission:  IP CONSULT TO CASE MANAGEMENT    Discharge Instruction:   Follow up appointments: chronic pain doctor  Primary care physician:  within 2 weeks    Diet:  low fat, low cholesterol diet   Activity: Please see instructions left by Dr Yariel Mas  Disposition: Discharged to:   [x]Home, [x][facility], []SNF, []Acute Rehab, []Hospice   Condition on discharge: Stable    Discharge Medications:        Medication List      START taking these medications    amoxicillin-clavulanate 500-125 MG per tablet  Commonly known as: Augmentin  Take 1 tablet by mouth 2 times daily for 10 days        CHANGE how you take these medications    * oxyCODONE-acetaminophen 5-325 MG per tablet  Commonly known as: PERCOCET  What changed: Another medication with the same name was added. Make sure you understand how and when to take each. * oxyCODONE-acetaminophen 5-325 MG per tablet  Commonly known as: Percocet  Take 1 tablet by mouth every 6 hours as needed for Pain for up to 3 days. Intended supply: 3 days. Take lowest dose possible to manage pain  What changed: You were already taking a medication with the same name, and this prescription was added. Make sure you understand how and when to take each. * promethazine 25 MG tablet  Commonly known as: PHENERGAN  Take 1 tablet by mouth every 8 hours as needed for Nausea  What changed: Another medication with the same name was added. Make sure you understand how and when to take each. * promethazine 25 MG tablet  Commonly known as: PHENERGAN  Take 1 tablet by mouth every 8 hours as needed for Nausea  What changed: You were already taking a medication with the same name, and this prescription was added. Make sure you understand how and when to take each.      scopolamine transdermal patch  Commonly known as: TRANSDERM-SCOP  APPLY 1 PATCH TRANSDERMALLY TO THE SKIN BEHIND THE EAR ONCE EVERY 3 DAYS AS NEEDED  What 4 hours as needed for Nausea or Vomiting     pantoprazole 40 MG tablet  Commonly known as: Protonix  Take 1 tablet by mouth 2 times daily     PARoxetine 30 MG tablet  Commonly known as: PAXIL  Take 1.5 tablets by mouth nightly     potassium gluconate 550 mg tablet     tiZANidine 4 MG tablet  Commonly known as: ZANAFLEX     VITAMIN B 12 PO     Vitamin D3 125 MCG (5000 UT) Tabs         * This list has 2 medication(s) that are the same as other medications prescribed for you. Read the directions carefully, and ask your doctor or other care provider to review them with you. Where to Get Your Medications      You can get these medications from any pharmacy    Bring a paper prescription for each of these medications  amoxicillin-clavulanate 500-125 MG per tablet  oxyCODONE-acetaminophen 5-325 MG per tablet  promethazine 25 MG tablet         Objective Findings at Discharge:   /69   Pulse 61   Temp 97.3 °F (36.3 °C) (Infrared)   Resp 18   Ht 5' 4\" (1.626 m)   Wt 238 lb (108 kg)   SpO2 95%   BMI 40.85 kg/m²            PHYSICAL EXAM   GEN Awake female, sitting upright in bed in no apparent distress. Appears given age. EYES Pupils are equally round. No scleral erythema, discharge, or conjunctivitis. HENT Mucous membranes are moist. Oral pharynx without exudates, no evidence of thrush. NECK Supple, no apparent thyromegaly or masses. RESP Clear to auscultation, no wheezes, rales or rhonchi. Symmetric chest movement while on room air. CARDIO/VASC S1/S2 auscultated. Regular rate without appreciable murmurs, rubs, or gallops. No JVD or carotid bruits. Peripheral pulses equal bilaterally and palpable. No peripheral edema. GI Abdomen is soft without significant tenderness, masses, or guarding. Bowel sounds are normoactive. Rectal exam deferred.  No costovertebral angle tenderness. MSK No gross joint deformities. Dressing to left foot C,D,& I  SKIN Normal coloration, warm, dry.   NEURO Cranial nerves appear grossly intact, normal speech, no lateralizing weakness. PSYCH Awake, alert, oriented x 4. Affect appropriate.     BMP/CBC  Recent Labs     04/15/22  1540 04/16/22  0555    139   K 3.9 4.4    106   CO2 24 27   BUN 12 11   CREATININE 0.8 0.7   WBC 3.9* 3.6*   HCT 32.9* 32.4*    164             Discharge Time of 35 minutes    Electronically signed by MICHAEL Strauss NP on 4/16/2022 at 10:50 AM

## 2022-04-16 NOTE — PROGRESS NOTES
This nurse was scanning pts Zofran and Dilaudid when pt started to make gagging or retching noises as if attempting to vomit. Pt was intermittently shaking, releasing hands and pulling at shirt. Pt was responsive to touch and speaking words intermittently saying \" help me\". Pt stopped intermittently shaking and started asking for her pain medicine, stating she's in pain. [facility] NP was notified and instructed this nurse to give the Dilaudid.

## 2022-04-18 ENCOUNTER — HOSPITAL ENCOUNTER (EMERGENCY)
Age: 52
Discharge: HOME | End: 2022-04-18
Attending: EMERGENCY MEDICINE
Payer: COMMERCIAL

## 2022-04-18 VITALS
DIASTOLIC BLOOD PRESSURE: 75 MMHG | RESPIRATION RATE: 18 BRPM | SYSTOLIC BLOOD PRESSURE: 135 MMHG | HEIGHT: 64 IN | OXYGEN SATURATION: 98 % | BODY MASS INDEX: 40.63 KG/M2 | TEMPERATURE: 97.7 F | HEART RATE: 61 BPM | WEIGHT: 238 LBS

## 2022-04-18 DIAGNOSIS — Z48.89 ENCOUNTER FOR POST SURGICAL WOUND CHECK: Primary | ICD-10-CM

## 2022-04-18 PROCEDURE — 96372 THER/PROPH/DIAG INJ SC/IM: CPT

## 2022-04-18 PROCEDURE — 99283 EMERGENCY DEPT VISIT LOW MDM: CPT

## 2022-04-18 PROCEDURE — 6370000000 HC RX 637 (ALT 250 FOR IP): Performed by: EMERGENCY MEDICINE

## 2022-04-18 PROCEDURE — 6360000002 HC RX W HCPCS: Performed by: EMERGENCY MEDICINE

## 2022-04-18 RX ORDER — OXYCODONE HYDROCHLORIDE AND ACETAMINOPHEN 5; 325 MG/1; MG/1
1 TABLET ORAL ONCE
Status: COMPLETED | OUTPATIENT
Start: 2022-04-18 | End: 2022-04-18

## 2022-04-18 RX ORDER — PROMETHAZINE HYDROCHLORIDE 25 MG/ML
25 INJECTION, SOLUTION INTRAMUSCULAR; INTRAVENOUS ONCE
Status: COMPLETED | OUTPATIENT
Start: 2022-04-18 | End: 2022-04-18

## 2022-04-18 RX ADMIN — PROMETHAZINE HYDROCHLORIDE 25 MG: 25 INJECTION INTRAMUSCULAR; INTRAVENOUS at 16:51

## 2022-04-18 RX ADMIN — OXYCODONE HYDROCHLORIDE AND ACETAMINOPHEN 1 TABLET: 5; 325 TABLET ORAL at 17:15

## 2022-04-18 ASSESSMENT — PAIN SCALES - GENERAL
PAINLEVEL_OUTOF10: 7
PAINLEVEL_OUTOF10: 7

## 2022-04-18 NOTE — ED TRIAGE NOTES
Patient states she had Out patient surgery at [facility] Friday for cyst removal on the left foot. Home health nurse recommended patien have foot looked at d/t concerned of a pocket forming. Patient is on augmentin.  Pt states she has experienced a fever and n/v this morning but was able to take morning meds

## 2022-04-18 NOTE — ED PROVIDER NOTES
Triage Chief Complaint:   Foot Pain and Post-op Problem    [place]:  Jono Prater is a 46 y.o. female that presents with the need for wound recheck. Patient was in baseline state of health until this past Friday when she had an infected cyst of her left foot removed by podiatry. Patient's home health care nurse came today and evaluated her wound and was concerned after packing was removed that there might be a \"pocket\" about the wound. Home health care nurse encouraged patient to come out to the emergency department today for reevaluation. Patient is working with Dr. Jose Pinon of podiatry for her foot. No fevers or chills. Patient is complaining of chronic abdominal pain and chronic nausea per her baseline. Patient does follow with pain management and is on Percocet long-term. ROS:  General:  No fevers, no chills  Respiratory:  No shortness of breath  GI: + nausea (chronic), + chronic abd pain  Neurologic:  No numbness, no weakness  Extremities:  No edema, + pain  Skin:  No rash, + wound  Psych: No axienty    Past Medical History:   Diagnosis Date    Acid reflux     Anemia     Anxiety     Arthritis     Hands, Back And Ankles    Bleeding ulcer 2014    \"I Had Ulcers In My Stomach And Colon\"/ per pt on 8/12/2019\"they said recently having some blood in my stomach- in July ( 2019)could not find where coming from \"    Bronchitis Last Episode 2014    Chronic back pain     [facility]     [facility]    COPD (chronic obstructive pulmonary disease) [address]     Sees Dr. Korey Salcedo Depression     Fibromyalgia Dx 2013    GERD (gastroesophageal reflux disease)     H/O echocardiogram 08/11/2015    EF >55%. LA to be at the upper limit of normal in size. LV hypertrophy with normal LV systolic, but abnormal diastolic function. Normal valvular structures and function.      H/O echocardiogram 08/30/2018    EF 55-60%    Hiatal hernia     History of blood transfusion 09/2015 And 2018 No Reaction To Blood Transfusions Received    History of sleeve gastrectomy     [place] hearing[place]     Right Ear    Hx of cardiac catheterization 10/24/2010    Angiographically normal coronary arteries w/ normal LV function and wall motion.  Hx of transesophageal echocardiography (PILO) for monitoring 12/02/2010    EF 55-60%. WNL.    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 name   Matt Span cysts     \"they found that I have a kidney cyst but not sure which side\" per pt on 7/15/2020    Lupus ([address] Dx 2013    \"Rheumatoid Lupus\"    MDRO (multiple drug resistant organisms) resistance     4 months ago chest from mediport    Morbid obesity ([address]     MRSA bacteremia     Nausea     \"Most Of The Time\"    Osteomyelitis of fifth toe of left foot ([address]     Pain management     Sees Dr. Piotr Gonsales, Once A Month    Pancreatitis chronic Dx 2001    Pneumonia Dx 11-15    Seizures ([address]     Shortness of breath on exertion     Shortness of breath on exertion     Sleep apnea     Has CPAP\"no longer use the cpap since lost weight\"    Staph infection Dx 11-15    Left Foot    Staph infection Dx 11-15    \"Left Foot\"    Teeth missing     Upper And Lower    Thyroid disease     Wears glasses     To Read     Past Surgical History:   Procedure Laterality Date    APPENDECTOMY  02/1998    Done When Tubes And Ovaries Were Removed    CARDIAC CATHETERIZATION  10/24/2010    CATHETER REMOVAL N/A 7/16/2019    PORT REMOVAL performed by Margaret Rey MD at [address]  08/27/1991    CHOLECYSTECTOMY, LAPAROSCOPIC  1990's    COLONOSCOPY  Last Done In 2000's    DENTAL SURGERY      Teeth Extracted In Past    ENDOSCOPY, COLON, DIAGNOSTIC  Last Done In 2018    FOOT DEBRIDEMENT Left 6/16/2019    FIRST METATARSAL DEBRIDEMENT INCISION AND DRAINAGE. EXCISION OF ULCER.  RESECTION OF BONE. 1ST METATARSAL POWER LAVAGE AND BONE CEMENT performed by Anita Solis DPM at [address] Left 4/15/2022    LEFT FOOT ABSCESS DEBRIDEMENT INCISION AND DRAINAGE, CYST REMOVAL performed by Anita Solis DPM at [address] Left Last Done In 2016     Dr. Sylvester Jerome, \" About 6 Surgeries Done Because Of Staph Infection\"    HIATAL HERNIA REPAIR N/A 2/12/2019    HERNIA HIATAL LAPAROSCOPIC ROBOTIC performed by Sumi Gilbert MD at [facility]  10/1997    Partial Abdominal Hysterectomy    INSERTION / REMOVAL / REPLACEMENT VENOUS ACCESS CATHETER N/A 8/15/2019    PORT INSERTION performed by Sumi Gilbert MD at [facility]    removed after 6 months    LEG BIOPSY EXCISION Left 3/8/2021    LEFT THIGH LESION BIOPSY EXCISION performed by Sumi Gilbert MD at [facility], PARTIAL Left 2008    Benign    OTHER SURGICAL HISTORY  02/1998    \"Tubes And Ovaries Removed, Appendectomy Also Done\"    OTHER SURGICAL HISTORY  Last Done 7-15-16    Mediport Insertion \"Total Of Six Done, Removed Last Mediport In 2014\"    PORT SURGERY N/A 1/15/2020    PORT REMOVAL performed by Sumi Gilbert MD at [facility] N/A 7/6/2020    PORT INSERTION performed by Sumi Gilbert MD at [facility] 8/3/2021    MEDIPORT REPLACEMENT performed by Sumi Gilbert MD at [address] [place] [address] N/A 8/16/2021    GASTRIC BYPASS RUFINO-EN-Y LAPAROSCOPIC ROBOTIC, LYSIS OF ADHESIONS performed by Sumi Gilbert MD at [facility] N/A 2/12/2019    GASTRECTOMY SLEEVE LAPAROSCOPIC ROBOTIC performed by Sumi Gilbert MD at [address] [place] [address]  08/27/2018    UPPER GASTROINTESTINAL ENDOSCOPY N/A 4/2/2019    EGD CONTROL HEMORRHAGE with epi injection at bleeding site performed by Scar Acevedo MD at [address] [place] [address] N/A 6/19/2019    EGD DIAGNOSTIC ONLY performed by Scar Acevedo MD at [address] [place] [address] N/A 7/22/2019    EGD DIAGNOSTIC ONLY performed by Adriano Mehta MD at [address] [place] [address] N/A 10/14/2019    EGD DIAGNOSTIC ONLY performed by Scar Acevedo MD at [address] [place] [address] N/A 7/17/2020    EGJ DILATATION BALLOON WITH 18-20 MM BALLOON, DILATED TO 20 MM. performed by Scar Acevedo MD at [address] [place] [address] N/A 5/28/2021    EGD BIOPSY performed by Scar [facility]vedo MD at [facility] ENDOSCOPY     Family History   Problem Relation Age of Onset    Diabetes Mother         \"Borderline Diabetes\"    High Blood Pressure Mother     Obesity Mother     Arthritis Mother     Heart Disease Mother     High Cholesterol Mother     Vision Loss Mother     Diabetes Father     High Blood Pressure Father     Asthma Father     Cancer Father         prostate cancer    High Blood Pressure Brother     Asthma Son     Vision Loss Son     Lupus Daughter     Other Daughter         \"Alot Of Female Problems\"     Social History     Socioeconomic History    Marital status:      Spouse name: Not on file    Number of children: Not on file    Years of education: Not on file    Highest education level: Not on file   Occupational History    Not on file   Tobacco Use    Smoking status: Never Smoker    Smokeless tobacco: Never Used   Vaping Use    Vaping Use: Never used   Substance and Sexual Activity    Alcohol use: No     Alcohol/week: 0.0 standard drinks    Drug use: No    Sexual activity: Not Currently   Other Topics Concern    Not on file   Social History Narrative    Not on file     Social Determinants of Health Financial Resource Strain: Low Risk     Difficulty of Paying Living Expenses: Not hard at all   Food Insecurity: No Food Insecurity    Worried About Running Out of Food in the Last Year: Never true    Tyrese of Food in the Last Year: Never true   Transportation Needs:     Lack of Transportation (Medical): Not on file    Lack of Transportation (Non-Medical): Not on file   Physical Activity:     Days of Exercise per Week: Not on file    Minutes of Exercise per Session: Not on file   Stress:     Feeling of Stress : Not on file   Social Connections:     Frequency of Communication with Friends and Family: Not on file    Frequency of Social Gatherings with Friends and Family: Not on file    Attends Jain Services: Not on file    Active Member of [address] [place] [address] [employer] or Organizations: Not on file    Attends Club or Organization Meetings: Not on file    Marital Status: Not on file   Intimate Partner Violence:     Fear of Current or Ex-Partner: Not on file    Emotionally Abused: Not on file    Physically Abused: Not on file    Sexually Abused: Not on file   Housing Stability:     Unable to Pay for Housing in the Last Year: Not on file    Number of [place] in the Last Year: Not on file    Unstable Housing in the Last Year: Not on file     Current Facility-Administered Medications   Medication Dose Route Frequency Provider Last Rate Last Admin    oxyCODONE-acetaminophen (PERCOCET) 5-325 MG per tablet 1 tablet  1 tablet Oral Once Amy Salas MD         Current Outpatient Medications   Medication Sig Dispense Refill    promethazine (PHENERGAN) 25 MG tablet Take 1 tablet by mouth every 8 hours as needed for Nausea 30 tablet 0    amoxicillin-clavulanate (AUGMENTIN) 500-125 MG per tablet Take 1 tablet by mouth 2 times daily for 10 days 20 tablet 0    oxyCODONE-acetaminophen (PERCOCET) 5-325 MG per tablet Take 1 tablet by mouth every 6 hours as needed for Pain for up to 3 days. Intended supply: 3 days.  Take lowest dose possible to manage pain 12 tablet 0    ibuprofen (ADVIL;MOTRIN) 800 MG tablet Take 1 tablet by mouth 3 times daily (with meals) 30 tablet 0    levETIRAcetam (KEPPRA) 1000 MG tablet Take 1 tablet by mouth twice daily 60 tablet 0    cloNIDine (CATAPRES) 0.1 MG tablet Take 1 tablet by mouth twice daily 60 tablet 0    atenolol (TENORMIN) 25 MG tablet Take 1 tablet by mouth once daily 30 tablet 2    pantoprazole (PROTONIX) 40 MG tablet Take 1 tablet by mouth 2 times daily 60 tablet 2    promethazine (PHENERGAN) 25 MG tablet Take 1 tablet by mouth every 8 hours as needed for Nausea 30 tablet 0    hydrOXYzine (VISTARIL) 50 MG capsule Take 1 capsule by mouth 2 times daily 30 capsule 2    levothyroxine (SYNTHROID) 125 MCG tablet Take 1 tablet by mouth Daily 30 tablet 2    PARoxetine (PAXIL) 30 MG tablet Take 1.5 tablets by mouth nightly 45 tablet 3    estradiol (ESTRACE) 0.5 MG tablet Take 1 tablet by mouth daily 30 tablet 2    benzocaine (ORAJEL) 10 % mucosal gel Apply to the ulcer 2-3 times a day 1 each 0    albuterol (PROVENTIL) (2.5 MG/3ML) 0.083% nebulizer solution Take 3 mLs by nebulization every 6 hours as needed for Wheezing 120 each 0    albuterol sulfate  (90 Base) MCG/ACT inhaler Inhale 2 puffs into the lungs 4 times daily 1 each 0    oxyCODONE-acetaminophen (PERCOCET) 5-325 MG per tablet TAKE 1 TABLET BY MOUTH EVERY 6 HOURS AS NEEDED FOR 28 DAYS      NARCAN 4 MG/0.1ML LIQD nasal spray USE AS DIRECTED AS NEEDED FOR SUSPECTED OPIOID OVERDOSE      ondansetron (ZOFRAN ODT) 4 MG disintegrating tablet Take 1 tablet by mouth every 4 hours as needed for Nausea or Vomiting 30 tablet 3    scopolamine (TRANSDERM-SCOP) transdermal patch APPLY 1 PATCH TRANSDERMALLY TO THE SKIN BEHIND THE EAR ONCE EVERY 3 DAYS AS NEEDED 10 patch 0    Melatonin 10 MG TABS Take 10-20 mg by mouth nightly as needed      Cyanocobalamin (VITAMIN B 12 PO) Take 1 tablet by mouth daily      meclizine (ANTIVERT) 25 MG tablet Take 25 mg by mouth 3 times daily as needed for Dizziness      betamethasone valerate (VALISONE) 0.1 % ointment APPLY OINTMENT TO AFFECTED AREA TWICE DAILY TO HANDS AND ELBOWS FOR 21 DAYS      potassium gluconate 550 mg tablet Take 1 tablet by mouth daily      tiZANidine (ZANAFLEX) 4 MG tablet Take 4 mg by mouth every 8 hours as needed       ferrous sulfate (IRON 325) 325 (65 Fe) MG tablet Take 1 tablet by mouth daily (with breakfast) 90 tablet 5    Cholecalciferol (VITAMIN D3) 5000 units TABS Take 1 tablet by mouth daily      Multiple Vitamin (MVI, BARIATRIC ADVANTAGE MULTI-FORMULA, CHEW TAB) Take 1 tablet by mouth daily 30 tablet 5    Calcium Citrate-Vitamin D (CALCIUM + VIT D, BARIATRIC ADVANTAGE, CHEWABLE TABLET) Take 1 tablet by mouth 2 times daily 120 tablet 5    gabapentin (NEURONTIN) 800 MG tablet Take 800 mg by mouth 3 times daily       Allergies   Allergen Reactions    Aspirin Palpitations     \"My Heart Rate Elevates\"    Compazine [Prochlorperazine] Rash    Shellfish-Derived Products Swelling    Toradol [Ketorolac Tromethamine] Rash    Haldol [Haloperidol]      Shaking      Reglan [Metoclopramide] Itching       Nursing Notes Reviewed    Physical Exam:  ED Triage Vitals [04/18/22 1625]   Enc Vitals Group      /75      Pulse 61      Resp 18      Temp 97.7 °F (36.5 °C)      Temp Source Temporal      SpO2 98 %      Weight 238 lb (108 kg)      Height 5' 4\" (1.626 m)      Head Circumference       Peak Flow       Pain Score       Pain Loc       Pain Edu? Excl. in [address]? My pulse ox interpretation is - normal    General appearance:  No acute distress. Sitting comfortably in bed. Skin:  Warm. Dry. There is a well approximated surgical wound to the plantar aspect of the left foot that is vertically oriented. More proximal aspect of the wound is open with no packing present. Wound is otherwise closed with sutures in place. There is no surrounding erythema or induration. No active drainage or underlying fluctuance to this area. Eye:  Extraocular movements intact. Ears, nose, mouth and throat:  Oral mucosa moist   Extremity:  No swelling. Normal ROM. Wound to left plantar foot as above. Heart:  Strong and symmetric peripheral pulses. Extremities are well perfused. Abdomen:  Non-distended. Respiratory:  Respirations nonlabored. Neurological:  Alert and oriented times 3. No focal neuro deficits. Sensation intact to light touch to distal upper/lower extremities; 5/5 and symmetric  and dorsi/plantar flexion. I have reviewed and interpreted all of the currently available lab results from this visit (if applicable):  No results found for this visit on 04/18/22. Radiographs (if obtained):  [] The following radiograph was interpreted by myself in the absence of a radiologist:   [] Radiologist's Report Reviewed:  No orders to display         EKG (if obtained): (All EKG's are interpreted by myself in the absence of a cardiologist)    Chart review shows recent radiographs:  No results found. MDM:  Pt presents as above. Emergent conditions considered. Presentation prompted initial history and physical.  Presentation is consistent with a healing postoperative wound that does not appear infected. I did call patient's podiatrist and spoke with Dr. Avis Guzman, and he was updated regarding patient's case. He recommended repacking the wound with iodoform gauze 1 cm which I did. Nonadherent dressing placed over this with Kerlix wrap and postop shoe. Patient to continue nonweightbearing with her crutches and follow-up in 3 days as per plan in Dr. Avis Guzman office. Patient was treated symptomatically in the emergency department for her nausea with Phenergan as per her chronic abdominal issues. Oral Percocet was given for pain control after the packing was placed.     I discussed specific signs and symptoms and when to return to the emergency department as well as need for close outpatient follow-up. Questions sought and answered with the patient. They voice understanding and agree with plan. Care of this patient did occur during the COVID-19 pandemic. Clinical Impression:  1. Encounter for post surgical wound check      Disposition referral (if applicable):  Kia Chinchilla DPM  [facility], [address] [place] [address]  [phone]    Go on 4/21/2022  GO TO YOUR PREVIOUSLY SCHEDULED APPOINTMENT (PLEASE CALL TOMORROW)    Disposition medications (if applicable):  New Prescriptions    No medications on file       Comment: Please note this report has been produced using speech recognition software and may contain errors related to that system including errors in grammar, punctuation, and spelling, as well as words and phrases that may be inappropriate. If there are any questions or concerns please feel free to contact the dictating provider for clarification.          Dyana Griffin MD  04/18/22 9399

## 2022-04-20 LAB
CULTURE: NORMAL
CULTURE: NORMAL
GRAM SMEAR: NORMAL
Lab: NORMAL
Lab: NORMAL
SPECIMEN: NORMAL
SPECIMEN: NORMAL

## 2022-04-28 ENCOUNTER — APPOINTMENT (OUTPATIENT)
Dept: CT IMAGING | Age: 52
DRG: 392 | End: 2022-04-28
Payer: COMMERCIAL

## 2022-04-28 ENCOUNTER — HOSPITAL ENCOUNTER (INPATIENT)
Age: 52
LOS: 4 days | Discharge: HOME | DRG: 392 | End: 2022-05-02
Attending: EMERGENCY MEDICINE | Admitting: INTERNAL MEDICINE
Payer: COMMERCIAL

## 2022-04-28 DIAGNOSIS — E86.1 HYPOTENSION DUE TO HYPOVOLEMIA: ICD-10-CM

## 2022-04-28 DIAGNOSIS — F41.9 ANXIETY: ICD-10-CM

## 2022-04-28 DIAGNOSIS — R10.84 GENERALIZED ABDOMINAL PAIN: Primary | ICD-10-CM

## 2022-04-28 DIAGNOSIS — G40.909 SEIZURE DISORDER (HCC): ICD-10-CM

## 2022-04-28 DIAGNOSIS — I95.89 HYPOTENSION DUE TO HYPOVOLEMIA: ICD-10-CM

## 2022-04-28 DIAGNOSIS — G40.919 BREAKTHROUGH SEIZURE (HCC): ICD-10-CM

## 2022-04-28 DIAGNOSIS — R11.0 CHRONIC NAUSEA: ICD-10-CM

## 2022-04-28 DIAGNOSIS — R19.7 NAUSEA VOMITING AND DIARRHEA: ICD-10-CM

## 2022-04-28 DIAGNOSIS — R11.2 NAUSEA VOMITING AND DIARRHEA: ICD-10-CM

## 2022-04-28 LAB
ALBUMIN SERPL-MCNC: 4 GM/DL (ref 3.4–5)
ALP BLD-CCNC: 144 IU/L (ref 40–129)
ALT SERPL-CCNC: 11 U/L (ref 10–40)
ANION GAP SERPL CALCULATED.3IONS-SCNC: 7 MMOL/L (ref 4–16)
AST SERPL-CCNC: 14 IU/L (ref 15–37)
BACTERIA: ABNORMAL /HPF
BASOPHILS ABSOLUTE: 0 K/CU MM
BASOPHILS RELATIVE PERCENT: 1 % (ref 0–1)
BILIRUB SERPL-MCNC: 0.2 MG/DL (ref 0–1)
BILIRUBIN URINE: NEGATIVE MG/DL
BLOOD, URINE: NEGATIVE
BUN BLDV-MCNC: 15 MG/DL (ref 6–23)
CALCIUM OXALATE CRYSTALS: ABNORMAL /HPF
CALCIUM SERPL-MCNC: 10.1 MG/DL (ref 8.3–10.6)
CHLORIDE BLD-SCNC: 107 MMOL/L (ref 99–110)
CLARITY: CLEAR
CO2: 24 MMOL/L (ref 21–32)
COLOR: YELLOW
CREAT SERPL-MCNC: 0.8 MG/DL (ref 0.6–1.1)
DIFFERENTIAL TYPE: ABNORMAL
EOSINOPHILS ABSOLUTE: 0.2 K/CU MM
EOSINOPHILS RELATIVE PERCENT: 6 % (ref 0–3)
GFR AFRICAN AMERICAN: >60 ML/MIN/1.73M2
GFR NON-AFRICAN AMERICAN: >60 ML/MIN/1.73M2
GLUCOSE BLD-MCNC: 89 MG/DL (ref 70–99)
GLUCOSE, URINE: NEGATIVE MG/DL
HCT VFR BLD CALC: 34.9 % (ref 37–47)
HEMOGLOBIN: 10.9 GM/DL (ref 12.5–16)
IMMATURE NEUTROPHIL %: 0.3 % (ref 0–0.43)
KETONES, URINE: NEGATIVE MG/DL
LACTATE: 0.9 MMOL/L (ref 0.4–2)
LEUKOCYTE ESTERASE, URINE: NEGATIVE
LIPASE: 15 IU/L (ref 13–60)
LYMPHOCYTES ABSOLUTE: 1.5 K/CU MM
LYMPHOCYTES RELATIVE PERCENT: 36.3 % (ref 24–44)
MCH RBC QN AUTO: 27.1 PG (ref 27–31)
MCHC RBC AUTO-ENTMCNC: 31.2 % (ref 32–36)
MCV RBC AUTO: 86.8 FL (ref 78–100)
MONOCYTES ABSOLUTE: 0.3 K/CU MM
MONOCYTES RELATIVE PERCENT: 8.5 % (ref 0–4)
MUCUS: ABNORMAL HPF
NITRITE URINE, QUANTITATIVE: NEGATIVE
NUCLEATED RBC %: 0 %
PDW BLD-RTO: 13.2 % (ref 11.7–14.9)
PH, URINE: 5.5 (ref 5–8)
PLATELET # BLD: 191 K/CU MM (ref 140–440)
PMV BLD AUTO: 10.5 FL (ref 7.5–11.1)
POTASSIUM SERPL-SCNC: 3.9 MMOL/L (ref 3.5–5.1)
PROTEIN UA: NEGATIVE MG/DL
RBC # BLD: 4.02 M/CU MM (ref 4.2–5.4)
RBC URINE: 1 /HPF (ref 0–6)
SEGMENTED NEUTROPHILS ABSOLUTE COUNT: 1.9 K/CU MM
SEGMENTED NEUTROPHILS RELATIVE PERCENT: 47.9 % (ref 36–66)
SODIUM BLD-SCNC: 138 MMOL/L (ref 135–145)
SPECIFIC GRAVITY UA: 1.02 (ref 1–1.03)
SQUAMOUS EPITHELIAL: 4 /HPF
TOTAL IMMATURE NEUTOROPHIL: 0.01 K/CU MM
TOTAL NUCLEATED RBC: 0 K/CU MM
TOTAL PROTEIN: 6.5 GM/DL (ref 6.4–8.2)
TRICHOMONAS: ABNORMAL /HPF
UROBILINOGEN, URINE: 0.2 MG/DL (ref 0.2–1)
WBC # BLD: 4 K/CU MM (ref 4–10.5)
WBC UA: 8 /HPF (ref 0–5)

## 2022-04-28 PROCEDURE — 6360000002 HC RX W HCPCS: Performed by: EMERGENCY MEDICINE

## 2022-04-28 PROCEDURE — 83605 ASSAY OF LACTIC ACID: CPT

## 2022-04-28 PROCEDURE — 96374 THER/PROPH/DIAG INJ IV PUSH: CPT

## 2022-04-28 PROCEDURE — 96376 TX/PRO/DX INJ SAME DRUG ADON: CPT

## 2022-04-28 PROCEDURE — 1200000000 HC SEMI PRIVATE

## 2022-04-28 PROCEDURE — 70450 CT HEAD/BRAIN W/O DYE: CPT

## 2022-04-28 PROCEDURE — A4216 STERILE WATER/SALINE, 10 ML: HCPCS | Performed by: INTERNAL MEDICINE

## 2022-04-28 PROCEDURE — 80177 DRUG SCRN QUAN LEVETIRACETAM: CPT

## 2022-04-28 PROCEDURE — 74177 CT ABD & PELVIS W/CONTRAST: CPT

## 2022-04-28 PROCEDURE — 6360000004 HC RX CONTRAST MEDICATION: Performed by: EMERGENCY MEDICINE

## 2022-04-28 PROCEDURE — 81001 URINALYSIS AUTO W/SCOPE: CPT

## 2022-04-28 PROCEDURE — 96375 TX/PRO/DX INJ NEW DRUG ADDON: CPT

## 2022-04-28 PROCEDURE — 2580000003 HC RX 258: Performed by: INTERNAL MEDICINE

## 2022-04-28 PROCEDURE — 6360000002 HC RX W HCPCS: Performed by: INTERNAL MEDICINE

## 2022-04-28 PROCEDURE — 2500000003 HC RX 250 WO HCPCS: Performed by: EMERGENCY MEDICINE

## 2022-04-28 PROCEDURE — 2580000003 HC RX 258: Performed by: EMERGENCY MEDICINE

## 2022-04-28 PROCEDURE — 80053 COMPREHEN METABOLIC PANEL: CPT

## 2022-04-28 PROCEDURE — 96372 THER/PROPH/DIAG INJ SC/IM: CPT

## 2022-04-28 PROCEDURE — 99285 EMERGENCY DEPT VISIT HI MDM: CPT

## 2022-04-28 PROCEDURE — A4216 STERILE WATER/SALINE, 10 ML: HCPCS | Performed by: EMERGENCY MEDICINE

## 2022-04-28 PROCEDURE — 83690 ASSAY OF LIPASE: CPT

## 2022-04-28 PROCEDURE — 6370000000 HC RX 637 (ALT 250 FOR IP): Performed by: INTERNAL MEDICINE

## 2022-04-28 PROCEDURE — G0378 HOSPITAL OBSERVATION PER HR: HCPCS

## 2022-04-28 PROCEDURE — 96361 HYDRATE IV INFUSION ADD-ON: CPT

## 2022-04-28 PROCEDURE — 85025 COMPLETE CBC W/AUTO DIFF WBC: CPT

## 2022-04-28 RX ORDER — OXYCODONE HYDROCHLORIDE AND ACETAMINOPHEN 5; 325 MG/1; MG/1
1 TABLET ORAL EVERY 8 HOURS PRN
Status: DISCONTINUED | OUTPATIENT
Start: 2022-04-28 | End: 2022-05-01

## 2022-04-28 RX ORDER — ATENOLOL 25 MG/1
25 TABLET ORAL DAILY
Status: DISCONTINUED | OUTPATIENT
Start: 2022-04-29 | End: 2022-05-02 | Stop reason: HOSPADM

## 2022-04-28 RX ORDER — ONDANSETRON 4 MG/1
4 TABLET, ORALLY DISINTEGRATING ORAL EVERY 8 HOURS PRN
Status: DISCONTINUED | OUTPATIENT
Start: 2022-04-28 | End: 2022-05-02 | Stop reason: HOSPADM

## 2022-04-28 RX ORDER — HYDROXYZINE PAMOATE 25 MG/1
50 CAPSULE ORAL 2 TIMES DAILY
Status: DISCONTINUED | OUTPATIENT
Start: 2022-04-28 | End: 2022-05-02 | Stop reason: HOSPADM

## 2022-04-28 RX ORDER — LORAZEPAM 2 MG/ML
1 INJECTION INTRAMUSCULAR EVERY 5 MIN PRN
Status: DISCONTINUED | OUTPATIENT
Start: 2022-04-28 | End: 2022-05-02 | Stop reason: HOSPADM

## 2022-04-28 RX ORDER — SODIUM CHLORIDE 9 MG/ML
INJECTION, SOLUTION INTRAVENOUS PRN
Status: DISCONTINUED | OUTPATIENT
Start: 2022-04-28 | End: 2022-05-02 | Stop reason: HOSPADM

## 2022-04-28 RX ORDER — 0.9 % SODIUM CHLORIDE 0.9 %
1000 INTRAVENOUS SOLUTION INTRAVENOUS ONCE
Status: COMPLETED | OUTPATIENT
Start: 2022-04-28 | End: 2022-04-28

## 2022-04-28 RX ORDER — ACETAMINOPHEN 650 MG/1
650 SUPPOSITORY RECTAL EVERY 6 HOURS PRN
Status: DISCONTINUED | OUTPATIENT
Start: 2022-04-28 | End: 2022-05-02 | Stop reason: HOSPADM

## 2022-04-28 RX ORDER — PANTOPRAZOLE SODIUM 40 MG/1
40 TABLET, DELAYED RELEASE ORAL 2 TIMES DAILY
Status: DISCONTINUED | OUTPATIENT
Start: 2022-04-28 | End: 2022-05-02 | Stop reason: HOSPADM

## 2022-04-28 RX ORDER — LEVETIRACETAM 500 MG/1
1000 TABLET ORAL 2 TIMES DAILY
Status: DISCONTINUED | OUTPATIENT
Start: 2022-04-28 | End: 2022-05-02 | Stop reason: HOSPADM

## 2022-04-28 RX ORDER — MECLIZINE HYDROCHLORIDE 25 MG/1
25 TABLET ORAL 3 TIMES DAILY PRN
Status: DISCONTINUED | OUTPATIENT
Start: 2022-04-28 | End: 2022-05-02 | Stop reason: HOSPADM

## 2022-04-28 RX ORDER — MORPHINE SULFATE 2 MG/ML
2 INJECTION, SOLUTION INTRAMUSCULAR; INTRAVENOUS ONCE
Status: COMPLETED | OUTPATIENT
Start: 2022-04-28 | End: 2022-04-28

## 2022-04-28 RX ORDER — ALBUTEROL SULFATE 2.5 MG/3ML
2.5 SOLUTION RESPIRATORY (INHALATION) EVERY 6 HOURS PRN
Status: DISCONTINUED | OUTPATIENT
Start: 2022-04-28 | End: 2022-05-02 | Stop reason: HOSPADM

## 2022-04-28 RX ORDER — SODIUM CHLORIDE 0.9 % (FLUSH) 0.9 %
10 SYRINGE (ML) INJECTION EVERY 12 HOURS SCHEDULED
Status: DISCONTINUED | OUTPATIENT
Start: 2022-04-28 | End: 2022-05-02 | Stop reason: HOSPADM

## 2022-04-28 RX ORDER — ONDANSETRON 2 MG/ML
4 INJECTION INTRAMUSCULAR; INTRAVENOUS ONCE
Status: COMPLETED | OUTPATIENT
Start: 2022-04-28 | End: 2022-04-28

## 2022-04-28 RX ORDER — GABAPENTIN 400 MG/1
800 CAPSULE ORAL 3 TIMES DAILY
Status: DISCONTINUED | OUTPATIENT
Start: 2022-04-28 | End: 2022-05-02 | Stop reason: HOSPADM

## 2022-04-28 RX ORDER — ENOXAPARIN SODIUM 100 MG/ML
30 INJECTION SUBCUTANEOUS 2 TIMES DAILY
Status: DISCONTINUED | OUTPATIENT
Start: 2022-04-28 | End: 2022-05-02 | Stop reason: HOSPADM

## 2022-04-28 RX ORDER — ONDANSETRON 2 MG/ML
4 INJECTION INTRAMUSCULAR; INTRAVENOUS EVERY 6 HOURS PRN
Status: DISCONTINUED | OUTPATIENT
Start: 2022-04-28 | End: 2022-05-02 | Stop reason: HOSPADM

## 2022-04-28 RX ORDER — PROMETHAZINE HYDROCHLORIDE 25 MG/1
25 TABLET ORAL EVERY 8 HOURS PRN
Status: DISCONTINUED | OUTPATIENT
Start: 2022-04-28 | End: 2022-04-29

## 2022-04-28 RX ORDER — MORPHINE SULFATE 2 MG/ML
2 INJECTION, SOLUTION INTRAMUSCULAR; INTRAVENOUS EVERY 4 HOURS PRN
Status: DISCONTINUED | OUTPATIENT
Start: 2022-04-28 | End: 2022-04-30

## 2022-04-28 RX ORDER — FERROUS SULFATE 325(65) MG
325 TABLET ORAL
Status: DISCONTINUED | OUTPATIENT
Start: 2022-04-29 | End: 2022-04-30

## 2022-04-28 RX ORDER — LEVOTHYROXINE SODIUM 0.12 MG/1
125 TABLET ORAL DAILY
Status: DISCONTINUED | OUTPATIENT
Start: 2022-04-29 | End: 2022-05-02 | Stop reason: HOSPADM

## 2022-04-28 RX ORDER — ESTRADIOL 1 MG/1
0.5 TABLET ORAL DAILY
Status: DISCONTINUED | OUTPATIENT
Start: 2022-04-29 | End: 2022-05-02 | Stop reason: HOSPADM

## 2022-04-28 RX ORDER — ACETAMINOPHEN 325 MG/1
650 TABLET ORAL EVERY 6 HOURS PRN
Status: DISCONTINUED | OUTPATIENT
Start: 2022-04-28 | End: 2022-05-02 | Stop reason: HOSPADM

## 2022-04-28 RX ORDER — TIZANIDINE 4 MG/1
4 TABLET ORAL EVERY 8 HOURS PRN
Status: DISCONTINUED | OUTPATIENT
Start: 2022-04-28 | End: 2022-05-02 | Stop reason: HOSPADM

## 2022-04-28 RX ORDER — SODIUM CHLORIDE 9 MG/ML
INJECTION, SOLUTION INTRAVENOUS CONTINUOUS
Status: DISCONTINUED | OUTPATIENT
Start: 2022-04-28 | End: 2022-04-30

## 2022-04-28 RX ORDER — LORAZEPAM 2 MG/ML
4 INJECTION INTRAMUSCULAR PRN
Status: DISCONTINUED | OUTPATIENT
Start: 2022-04-28 | End: 2022-04-29

## 2022-04-28 RX ORDER — CHOLECALCIFEROL (VITAMIN D3) 125 MCG
10 CAPSULE ORAL NIGHTLY PRN
Status: DISCONTINUED | OUTPATIENT
Start: 2022-04-28 | End: 2022-05-02 | Stop reason: HOSPADM

## 2022-04-28 RX ORDER — MULTIVIT-MIN/FERROUS GLUCONATE 9 MG/15 ML
15 LIQUID (ML) ORAL DAILY
Status: DISCONTINUED | OUTPATIENT
Start: 2022-04-29 | End: 2022-05-02 | Stop reason: HOSPADM

## 2022-04-28 RX ORDER — MORPHINE SULFATE 4 MG/ML
4 INJECTION, SOLUTION INTRAMUSCULAR; INTRAVENOUS ONCE
Status: COMPLETED | OUTPATIENT
Start: 2022-04-28 | End: 2022-04-28

## 2022-04-28 RX ORDER — CLONIDINE HYDROCHLORIDE 0.1 MG/1
0.1 TABLET ORAL 2 TIMES DAILY
Status: DISCONTINUED | OUTPATIENT
Start: 2022-04-28 | End: 2022-05-02 | Stop reason: HOSPADM

## 2022-04-28 RX ORDER — SODIUM CHLORIDE 0.9 % (FLUSH) 0.9 %
10 SYRINGE (ML) INJECTION PRN
Status: DISCONTINUED | OUTPATIENT
Start: 2022-04-28 | End: 2022-05-02 | Stop reason: HOSPADM

## 2022-04-28 RX ADMIN — ENOXAPARIN SODIUM 30 MG: 100 INJECTION SUBCUTANEOUS at 23:46

## 2022-04-28 RX ADMIN — MORPHINE SULFATE 4 MG: 4 INJECTION, SOLUTION INTRAMUSCULAR; INTRAVENOUS at 17:38

## 2022-04-28 RX ADMIN — ONDANSETRON 4 MG: 2 INJECTION INTRAMUSCULAR; INTRAVENOUS at 23:44

## 2022-04-28 RX ADMIN — IOPAMIDOL 75 ML: 755 INJECTION, SOLUTION INTRAVENOUS at 19:28

## 2022-04-28 RX ADMIN — MORPHINE SULFATE 2 MG: 2 INJECTION, SOLUTION INTRAMUSCULAR; INTRAVENOUS at 23:44

## 2022-04-28 RX ADMIN — SODIUM CHLORIDE 1000 ML: 9 INJECTION, SOLUTION INTRAVENOUS at 17:38

## 2022-04-28 RX ADMIN — PAROXETINE 45 MG: 10 TABLET, FILM COATED ORAL at 23:45

## 2022-04-28 RX ADMIN — SODIUM CHLORIDE, PRESERVATIVE FREE 10 ML: 5 INJECTION INTRAVENOUS at 23:58

## 2022-04-28 RX ADMIN — LEVETIRACETAM 1000 MG: 500 TABLET, FILM COATED ORAL at 23:44

## 2022-04-28 RX ADMIN — SODIUM CHLORIDE: 9 INJECTION, SOLUTION INTRAVENOUS at 23:43

## 2022-04-28 RX ADMIN — ONDANSETRON 4 MG: 2 INJECTION INTRAMUSCULAR; INTRAVENOUS at 19:53

## 2022-04-28 RX ADMIN — FAMOTIDINE 20 MG: 10 INJECTION, SOLUTION INTRAVENOUS at 21:15

## 2022-04-28 RX ADMIN — ONDANSETRON 4 MG: 2 INJECTION INTRAMUSCULAR; INTRAVENOUS at 17:39

## 2022-04-28 RX ADMIN — SODIUM CHLORIDE 1000 ML: 9 INJECTION, SOLUTION INTRAVENOUS at 20:51

## 2022-04-28 RX ADMIN — GABAPENTIN 800 MG: 400 CAPSULE ORAL at 23:45

## 2022-04-28 RX ADMIN — HYDROXYZINE PAMOATE 50 MG: 25 CAPSULE ORAL at 23:45

## 2022-04-28 RX ADMIN — PANTOPRAZOLE SODIUM 40 MG: 40 TABLET, DELAYED RELEASE ORAL at 23:45

## 2022-04-28 RX ADMIN — MORPHINE SULFATE 2 MG: 2 INJECTION, SOLUTION INTRAMUSCULAR; INTRAVENOUS at 19:53

## 2022-04-28 ASSESSMENT — PAIN SCALES - GENERAL
PAINLEVEL_OUTOF10: 7
PAINLEVEL_OUTOF10: 7
PAINLEVEL_OUTOF10: 8
PAINLEVEL_OUTOF10: 6
PAINLEVEL_OUTOF10: 8

## 2022-04-28 ASSESSMENT — PAIN DESCRIPTION - LOCATION
LOCATION: ABDOMEN

## 2022-04-28 ASSESSMENT — PAIN DESCRIPTION - FREQUENCY: FREQUENCY: CONTINUOUS

## 2022-04-28 ASSESSMENT — PAIN DESCRIPTION - ORIENTATION
ORIENTATION: RIGHT
ORIENTATION: UPPER;RIGHT

## 2022-04-28 ASSESSMENT — PAIN DESCRIPTION - DESCRIPTORS
DESCRIPTORS: ACHING;THROBBING;SHARP
DESCRIPTORS: DULL

## 2022-04-28 ASSESSMENT — PAIN DESCRIPTION - PAIN TYPE: TYPE: ACUTE PAIN

## 2022-04-28 NOTE — ED PROVIDER NOTES
Emergency Department Encounter    Patient: Blossom Blizzard  MRN: [id number]  : 1970  Date of Evaluation: 2022  ED Provider:  Marbella Doty DO    Triage Chief Complaint:   Abdominal Pain, Seizures (0800 today, takes Keppra), and Emesis    [place]:  Blossom Blizzard is a 46 y.o. female that presents to the emergency department complaint of abdominal pain nausea vomiting diarrhea for last 2 to 3 days. Patient states it feels like her pancreatitis is flaring up. Patient states no sick contacts. Patient states she is being a dizzy lightheaded feeling like she is dehydrated. Patient states the pains in the upper abdomen radiates to her back. Patient states she is unable to keep down her home Percocet. Patient states she also had a seizure this morning she is on Keppra she has a neurologist.  Patient states when she does not feel well she has seizure. Patient that she had a seizure 2 weeks ago after her left foot surgery. Patient that she had multiple foot surgeries on the left foot. Patient states he had to have a cyst removed. Patient states she does have a left chest Mediport. Patient states she is unsure if she hit her head this morning. Patient states she is unable keep any foods liquids or medications down decreased p.o. intake last couple days. Patient here for evaluation.     ROS - see HPI, below listed is current ROS at time of my eval:  General:  No fevers, no chills, no weakness  Eyes:  No recent vison changes, no discharge  ENT:  No sore throat, no nasal congestion, no hearing changes  Cardiovascular:  No chest pain, no palpitations  Respiratory:  No shortness of breath, no cough, no wheezing  Gastrointestinal:  No pain, no nausea, no vomiting, no diarrhea  Musculoskeletal:  No muscle pain, no joint pain  Skin:  No rash, no pruritis, no easy bruising  Neurologic:  No speech problems, no headache, no extremity numbness, no extremity tingling, no extremity weakness  Psychiatric: No anxiety  Genitourinary:  No dysuria, no hematuria  Endocrine:  No unexpected weight gain, no unexpected weight loss  Extremities:  no edema, no pain    Past Medical History:   Diagnosis Date    Acid reflux     Anemia     Anxiety     Arthritis     Hands, Back And Ankles    Bleeding ulcer 2014    \"I Had Ulcers In My Stomach And Colon\"/ per pt on 8/12/2019\"they said recently having some blood in my stomach- in July ( 2019)could not find where coming from \"    Bronchitis Last Episode 2014    Chronic back pain     [facility]     [facility]    COPD (chronic obstructive pulmonary disease) ([facility]     Sees Dr. Swati Toney Depression     Fibromyalgia Dx 2013    GERD (gastroesophageal reflux disease)     H/O echocardiogram 08/11/2015    EF >55%. LA to be at the upper limit of normal in size. LV hypertrophy with normal LV systolic, but abnormal diastolic function. Normal valvular structures and function.  H/O echocardiogram 08/30/2018    EF 55-60%    Hiatal hernia     History of blood transfusion 09/2015 And 2018    No Reaction To Blood Transfusions Received    History of sleeve gastrectomy     [place] (hard of hearing)     Right Ear    Hx of cardiac catheterization 10/24/2010    Angiographically normal coronary arteries w/ normal LV function and wall motion.  Hx of transesophageal echocardiography (PILO) for monitoring 12/02/2010    EF 55-60%. WNL.    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 name   Kalee Beat cysts     \"they found that I have a kidney cyst but not sure which side\" per pt on 7/15/2020    Lupus ([facility] Dx 2013    \"Rheumatoid Lupus\"    MDRO (multiple drug resistant organisms) resistance     4 months ago chest from mediport    Morbid obesity ([facility]     MRSA bacteremia     Nausea     \"Most Of The Time\"    Osteomyelitis of fifth toe of left foot (HCC)     Pain management     Sees Dr. Vlad Berger, Once A Month    Pancreatitis chronic Dx 2001    Pneumonia Dx 11-15    Seizures ([address]     Shortness of breath on exertion     Shortness of breath on exertion     Sleep apnea     Has CPAP\"no longer use the cpap since lost weight\"    Staph infection Dx 11-15    Left Foot    Staph infection Dx 11-15    \"Left Foot\"    Teeth missing     Upper And Lower    Thyroid disease     Wears glasses     To Read     Past Surgical History:   Procedure Laterality Date    APPENDECTOMY  02/1998    Done When Tubes And Ovaries Were Removed    CARDIAC CATHETERIZATION  10/24/2010    CATHETER REMOVAL N/A 7/16/2019    PORT REMOVAL performed by Nanette Whittaker MD at [address] [facility]  08/27/1991    CHOLECYSTECTOMY, LAPAROSCOPIC  1990's    COLONOSCOPY  Last Done In 2000's    DENTAL SURGERY      Teeth Extracted In Past    ENDOSCOPY, COLON, DIAGNOSTIC  Last Done In 2018    FOOT DEBRIDEMENT Left 6/16/2019    FIRST METATARSAL DEBRIDEMENT INCISION AND DRAINAGE. EXCISION OF ULCER.  RESECTION OF BONE. 1ST METATARSAL POWER LAVAGE AND BONE CEMENT performed by Masood Fox DPM at [address] [facility] [address] Left 4/15/2022    LEFT FOOT ABSCESS DEBRIDEMENT INCISION AND DRAINAGE, CYST REMOVAL performed by Masood Fox DPM at [address] Left Last Done In 2016     Dr. Jose Pisano, \" About 6 Surgeries Done Because Of Staph Infection\"    HIATAL HERNIA REPAIR N/A 2/12/2019    HERNIA HIATAL LAPAROSCOPIC ROBOTIC performed by Nanette Whittaker MD at [address] [facility] [facility] [address]  10/1997    Partial Abdominal Hysterectomy    INSERTION / REMOVAL / REPLACEMENT VENOUS ACCESS CATHETER N/A 8/15/2019    PORT INSERTION performed by Nanette Whittaker MD at [address]  ~2000    removed after 6 months    LEG BIOPSY EXCISION Left 3/8/2021    LEFT THIGH LESION BIOPSY EXCISION performed by Gabriela London MD at [facility], PARTIAL Left 2008    Benign    OTHER SURGICAL HISTORY  02/1998    \"Tubes And Ovaries Removed, Appendectomy Also Done\"    OTHER SURGICAL HISTORY  Last Done 7-15-16    Mediport Insertion \"Total Of Six Done, Removed Last Mediport In 2014\"    PORT SURGERY N/A 1/15/2020    PORT REMOVAL performed by Gabriela London MD at [address] [place] [address] N/A 7/6/2020    PORT INSERTION performed by Gabriela London MD at [address] [place] [address] Left 8/3/2021    MEDIPORT REPLACEMENT performed by Gabriela London MD at [address] [place] [address] N/A 8/16/2021    GASTRIC BYPASS RUFINO-EN-Y LAPAROSCOPIC ROBOTIC, LYSIS OF ADHESIONS performed by Gabriela London MD at [address] [place] [address] N/A 2/12/2019    GASTRECTOMY SLEEVE LAPAROSCOPIC ROBOTIC performed by Gabriela London MD at [address] [place] [address]  08/27/2018    UPPER GASTROINTESTINAL ENDOSCOPY N/A 4/2/2019    EGD CONTROL HEMORRHAGE with epi injection at bleeding site performed by Gabriela London MD at [place] [address] 6/19/2019    EGD DIAGNOSTIC ONLY [facility] N/A 7/22/2019    EGD DIAGNOSTIC ONLY performed by King Fidel MD at [place] [address] 10/14/2019    EGD DIAGNOSTIC ONLY performed by Gabriela [facility] MD at [facility] N/ 7/17/2020    EGJ DILATATION BALLOON WITH 18-20 MM BALLOON, DILATED TO 20 MM. performed by Gabriela London MD at [facility] N/A 5/28/2021    EGD BIOPSY performed by Gabriela London MD at [facility] ENDOSCOPY     Family History   Problem Relation Age of Onset    Diabetes Mother \"Borderline Diabetes\"    High Blood Pressure Mother     Obesity Mother     Arthritis Mother     Heart Disease Mother     High Cholesterol Mother     Vision Loss Mother     Diabetes Father     High Blood Pressure Father     Asthma Father     Cancer Father         prostate cancer    High Blood Pressure Brother     Asthma Son     Vision Loss Son     Lupus Daughter     Other Daughter         \"Alot Of Female Problems\"     Social History     Socioeconomic History    Marital status:      Spouse name: Not on file    Number of children: Not on file    Years of education: Not on file    Highest education level: Not on file   Occupational History    Not on file   Tobacco Use    Smoking status: Never Smoker    Smokeless tobacco: Never Used   Vaping Use    Vaping Use: Never used   Substance and Sexual Activity    Alcohol use: No     Alcohol/week: 0.0 standard drinks    Drug use: No    Sexual activity: Not Currently   Other Topics Concern    Not on file   Social History Narrative    Not on file     Social Determinants of Health     Financial Resource Strain: Low Risk     Difficulty of Paying Living Expenses: Not hard at all   Food Insecurity: No Food Insecurity    Worried About Running Out of Food in the Last Year: Never true    [address] in the Last Year: Never true   Transportation Needs:     Lack of Transportation (Medical): Not on file    Lack of Transportation (Non-Medical):  Not on file   Physical Activity:     Days of Exercise per Week: Not on file    Minutes of Exercise per Session: Not on file   Stress:     Feeling of Stress : Not on file   Social Connections:     Frequency of Communication with Friends and Family: Not on file    Frequency of Social Gatherings with Friends and Family: Not on file    Attends Zoroastrian Services: Not on file    Active Member of Clubs or Organizations: Not on file    Attends Club or Organization Meetings: Not on file    Marital Status: Not on file   Intimate Partner Violence:     Fear of Current or Ex-Partner: Not on file    Emotionally Abused: Not on file    Physically Abused: Not on file    Sexually Abused: Not on file   Housing Stability:     Unable to Pay for Housing in the Last Year: Not on file    Number of Elias in the Last Year: Not on file    Unstable Housing in the Last Year: Not on file     Current Facility-Administered Medications   Medication Dose Route Frequency Provider Last Rate Last Admin    morphine sulfate (PF) injection 4 mg  4 mg IntraVENous Once Daisy Shi, DO        [facility] injection 4 mg  4 mg IntraVENous Once Daisy Shi, DO        0.9 % sodium chloride bolus  1,000 mL IntraVENous Once Daisy Leiva, DO         Current Outpatient Medications   Medication Sig Dispense Refill    ibuprofen (ADVIL;MOTRIN) 800 MG tablet Take 1 tablet by mouth 3 times daily (with meals) 30 tablet 0    levETIRAcetam (KEPPRA) 1000 MG tablet Take 1 tablet by mouth twice daily 60 tablet 0    cloNIDine (CATAPRES) 0.1 MG tablet Take 1 tablet by mouth twice daily 60 tablet 0    atenolol (TENORMIN) 25 MG tablet Take 1 tablet by mouth once daily 30 tablet 2    pantoprazole (PROTONIX) 40 MG tablet Take 1 tablet by mouth 2 times daily 60 tablet 2    promethazine (PHENERGAN) 25 MG tablet Take 1 tablet by mouth every 8 hours as needed for Nausea 30 tablet 0    hydrOXYzine (VISTARIL) 50 MG capsule Take 1 capsule by mouth 2 times daily 30 capsule 2    levothyroxine (SYNTHROID) 125 MCG tablet Take 1 tablet by mouth Daily 30 tablet 2    PARoxetine (PAXIL) 30 MG tablet Take 1.5 tablets by mouth nightly 45 tablet 3    estradiol (ESTRACE) 0.5 MG tablet Take 1 tablet by mouth daily 30 tablet 2    benzocaine (ORAJEL) 10 % mucosal gel Apply to the ulcer 2-3 times a day 1 each 0    albuterol (PROVENTIL) (2.5 MG/3ML) 0.083% nebulizer solution Take 3 mLs by nebulization every 6 hours as needed for Wheezing 120 each 0    albuterol sulfate  (90 Base) MCG/ACT inhaler Inhale 2 puffs into the lungs 4 times daily 1 each 0    oxyCODONE-acetaminophen (PERCOCET) 5-325 MG per tablet TAKE 1 TABLET BY MOUTH EVERY 6 HOURS AS NEEDED FOR 28 DAYS      NARCAN 4 MG/0.1ML LIQD nasal spray USE AS DIRECTED AS NEEDED FOR SUSPECTED OPIOID OVERDOSE      ondansetron (ZOFRAN ODT) 4 MG disintegrating tablet Take 1 tablet by mouth every 4 hours as needed for Nausea or Vomiting 30 tablet 3    scopolamine (TRANSDERM-SCOP) transdermal patch APPLY 1 PATCH TRANSDERMALLY TO THE SKIN BEHIND THE EAR ONCE EVERY 3 DAYS AS NEEDED 10 patch 0    Melatonin 10 MG TABS Take 10-20 mg by mouth nightly as needed      Cyanocobalamin (VITAMIN B 12 PO) Take 1 tablet by mouth daily      meclizine (ANTIVERT) 25 MG tablet Take 25 mg by mouth 3 times daily as needed for Dizziness      betamethasone valerate (VALISONE) 0.1 % ointment APPLY OINTMENT TO AFFECTED AREA TWICE DAILY TO HANDS AND ELBOWS FOR 21 DAYS      potassium gluconate 550 mg tablet Take 1 tablet by mouth daily      tiZANidine (ZANAFLEX) 4 MG tablet Take 4 mg by mouth every 8 hours as needed       ferrous sulfate (IRON 325) 325 (65 Fe) MG tablet Take 1 tablet by mouth daily (with breakfast) 90 tablet 5    Cholecalciferol (VITAMIN D3) 5000 units TABS Take 1 tablet by mouth daily      Multiple Vitamin (MVI, BARIATRIC ADVANTAGE MULTI-FORMULA, CHEW TAB) Take 1 tablet by mouth daily 30 tablet 5    Calcium Citrate-Vitamin D (CALCIUM + VIT D, BARIATRIC ADVANTAGE, CHEWABLE TABLET) Take 1 tablet by mouth 2 times daily 120 tablet 5    gabapentin (NEURONTIN) 800 MG tablet Take 800 mg by mouth 3 times daily       Allergies   Allergen Reactions    Aspirin Palpitations     \"My Heart Rate Elevates\"    Compazine [Prochlorperazine] Rash    Shellfish-Derived Products Swelling    Toradol [Ketorolac Tromethamine] Rash    Haldol [Haloperidol]      Shaking      Reglan [Metoclopramide] Itching Nursing Notes Reviewed    Physical Exam:  Triage VS:    ED Triage Vitals [04/28/22 1520]   Enc Vitals Group      BP (!) 100/56      Pulse 63      Resp 16      Temp 98.6 °F (37 °C)      Temp Source Oral      SpO2 100 %      Weight 240 lb (108.9 kg)      Height 5' 4\" (1.626 m)      Head Circumference       Peak Flow       Pain Score       Pain Loc       Pain Edu? Excl. in [address]? /72   Pulse 60   Temp 98.9 °F (37.2 °C) (Oral)   Resp 18   Ht 5' 4\" (1.626 m)   Wt 240 lb (108.9 kg)   SpO2 98%   BMI 41.20 kg/m²       My pulse ox interpretation is - normal    General appearance:  No acute distress. Skin:  Warm. Dry. Eye:  Extraocular movements intact. Ears, nose, mouth and throat:  Oral mucosa moist   Neck: No midline cervical spine crepitus step-off or deformity. Trachea midline. Extremity: Left foot wound wrapped in bandage normal capillary refill to the digits of the left foot, no swelling. Normal ROM     Heart:  Regular rate and rhythm, normal S1 & S2, no extra heart sounds. Perfusion:  intact  Respiratory:  Lungs clear to auscultation bilaterally. Respirations nonlabored. Abdominal: Morbidly obese abdomen, normal bowel sounds. Soft. Upper abdominal pain and tender to palpation, no rigidity. Non distended. Back:  No CVA tenderness to palpation     Neurological:  Alert and oriented times 3. No focal neuro deficits.              Psychiatric:  Appropriate    I have reviewed and interpreted all of the currently available lab results from this visit (if applicable):  Results for orders placed or performed during the hospital encounter of 04/28/22   CBC with Auto Differential   Result Value Ref Range    WBC 4.0 4.0 - 10.5 K/CU MM    RBC 4.02 (L) 4.2 - 5.4 M/CU MM    Hemoglobin 10.9 (L) 12.5 - 16.0 GM/DL    Hematocrit 34.9 (L) 37 - 47 %    MCV 86.8 78 - 100 FL    MCH 27.1 27 - 31 PG    MCHC 31.2 (L) 32.0 - 36.0 %    RDW 13.2 11.7 - 14.9 %    Platelets 441 733 - 663 K/CU MM MPV 10.5 7.5 - 11.1 FL    Differential Type AUTOMATED DIFFERENTIAL     Segs Relative 47.9 36 - 66 %    Lymphocytes % 36.3 24 - 44 %    Monocytes % 8.5 (H) 0 - 4 %    Eosinophils % 6.0 (H) 0 - 3 %    Basophils % 1.0 0 - 1 %    Segs Absolute 1.9 K/CU MM    Lymphocytes Absolute 1.5 K/CU MM    Monocytes Absolute 0.3 K/CU MM    Eosinophils Absolute 0.2 K/CU MM    Basophils Absolute 0.0 K/CU MM    Nucleated RBC % 0.0 %    Total Nucleated RBC 0.0 K/CU MM    Total Immature Neutrophil 0.01 K/CU MM    Immature Neutrophil % 0.3 0 - 0.43 %   Comprehensive Metabolic Panel   Result Value Ref Range    Sodium 138 135 - 145 MMOL/L    Potassium 3.9 3.5 - 5.1 MMOL/L    Chloride 107 99 - 110 mMol/L    CO2 24 21 - 32 MMOL/L    BUN 15 6 - 23 MG/DL    CREATININE 0.8 0.6 - 1.1 MG/DL    Glucose 89 70 - 99 MG/DL    Calcium 10.1 8.3 - 10.6 MG/DL    Albumin 4.0 3.4 - 5.0 GM/DL    Total Protein 6.5 6.4 - 8.2 GM/DL    Total Bilirubin 0.2 0.0 - 1.0 MG/DL    ALT 11 10 - 40 U/L    AST 14 (L) 15 - 37 IU/L    Alkaline Phosphatase 144 (H) 40 - 129 IU/L    GFR Non-African American >60 >60 mL/min/1.73m2    GFR African American >60 >60 mL/min/1.73m2    Anion Gap 7 4 - 16   Lipase   Result Value Ref Range    Lipase 15 13 - 60 IU/L   Urinalysis with Microscopic   Result Value Ref Range    Color, UA YELLOW YELLOW    Clarity, UA CLEAR CLEAR    Glucose, Urine NEGATIVE NEGATIVE MG/DL    Bilirubin Urine NEGATIVE NEGATIVE MG/DL    Ketones, Urine NEGATIVE NEGATIVE MG/DL    Specific Gravity, UA 1.025 1.001 - 1.035    Blood, Urine NEGATIVE NEGATIVE    pH, Urine 5.5 5.0 - 8.0    Protein, UA NEGATIVE NEGATIVE MG/DL    Urobilinogen, Urine 0.2 0.2 - 1.0 MG/DL    Nitrite Urine, Quantitative NEGATIVE NEGATIVE    Leukocyte Esterase, Urine NEGATIVE NEGATIVE    RBC, UA 1 0 - 6 /HPF    WBC, UA 8 (H) 0 - 5 /HPF    Bacteria, UA RARE (A) NEGATIVE /HPF    Squam Epithel, UA 4 /HPF    Mucus, UA OCCASIONAL (A) NEGATIVE HPF    Trichomonas, UA NONE SEEN NONE SEEN /HPF    Ca Oxalate Zoila, UA RARE /HPF   Lactic Acid   Result Value Ref Range    Lactate 0.9 0.4 - 2.0 mMOL/L      Radiographs (if obtained):  Radiologist's Report Reviewed:  CT ABDOMEN PELVIS W IV CONTRAST Additional Contrast? None   Final Result   Head CT: No acute intracranial abnormality. No acute territorial infarction,   intracranial hemorrhage or mass lesion. CT of the abdomen and pelvis:      No acute pathology within the abdomen and pelvis. Multiple nonobstructing   left kidney stones. Status post gastric bypass with mild to moderate intra and extrahepatic   biliary dilatation and pneumobilia. 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 No   further follow-up is needed. CT HEAD WO CONTRAST   Final Result   Head CT: No acute intracranial abnormality. No acute territorial infarction,   intracranial hemorrhage or mass lesion. CT of the abdomen and pelvis:      No acute pathology within the abdomen and pelvis. Multiple nonobstructing   left kidney stones. Status post gastric bypass with mild to moderate intra and extrahepatic   biliary dilatation and pneumobilia. 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 No   further follow-up is needed. EKG (if obtained): (All EKG's are interpreted by myself in the absence of a cardiologist)      MDM:  Patient presents to the emergency department complaint of abdominal pain nausea vomiting diarrhea feels like pancreatitis. Patient ordered morphine Zofran IV fluids laboratory studies CT scan of the abdomen pelvis. Patient states history of multiple abdominal surgeries including gastric bypass and cholecystectomy. I did order CT scan of the head patient states she walked to the bathroom when she fell and had a seizure this morning unsure if she hit her head. Patient normal white blood cell count. Patient chronic anemia hemoglobin 10.9. Patient normal platelets.   Patient normal sodium potassium kidney function calcium glucose normal liver enzymes lipase, normal lactic acid. Patient was ordered a Keppra level which is pending. Urinalysis 8 white blood cells rare bacteria patient denies any dysuria. CT scan negative for any acute findings. Reevaluation patient blood pressure 106/58 and 107/59, 98/60. Patient started on the second liter IV fluid. Patient states she feels dehydrated she has had nausea vomiting diarrhea for 3 days. Patient states she has been trying to sip water. I have consulted hospitalist for admission. CT scan of the head no acute findings. Clinical Impression:  1. Generalized abdominal pain    2. Breakthrough seizure ([address]    3. Seizure disorder ([facility]    4. Nausea vomiting and diarrhea    5. Hypotension due to hypovolemia      ED Provider Disposition Time  DISPOSITION   9:07 PM        Comment: Please note this report has been produced using speech recognition software and may contain errors related to that system including errors in grammar, punctuation, and spelling, as well as words and phrases that may be inappropriate. Efforts were made to edit the dictations.         Lashell Moser DO  04/28/22 2129

## 2022-04-29 LAB
ANION GAP SERPL CALCULATED.3IONS-SCNC: 9 MMOL/L (ref 4–16)
BASOPHILS ABSOLUTE: 0 K/CU MM
BASOPHILS RELATIVE PERCENT: 1.1 % (ref 0–1)
BUN BLDV-MCNC: 12 MG/DL (ref 6–23)
CALCIUM SERPL-MCNC: 9.4 MG/DL (ref 8.3–10.6)
CHLORIDE BLD-SCNC: 110 MMOL/L (ref 99–110)
CO2: 20 MMOL/L (ref 21–32)
CREAT SERPL-MCNC: 0.9 MG/DL (ref 0.6–1.1)
DIFFERENTIAL TYPE: ABNORMAL
EOSINOPHILS ABSOLUTE: 0.2 K/CU MM
EOSINOPHILS RELATIVE PERCENT: 5.8 % (ref 0–3)
GFR AFRICAN AMERICAN: >60 ML/MIN/1.73M2
GFR NON-AFRICAN AMERICAN: >60 ML/MIN/1.73M2
GLUCOSE BLD-MCNC: 82 MG/DL (ref 70–99)
HCT VFR BLD CALC: 33.9 % (ref 37–47)
HEMOGLOBIN: 10.4 GM/DL (ref 12.5–16)
IMMATURE NEUTROPHIL %: 0 % (ref 0–0.43)
LYMPHOCYTES ABSOLUTE: 1.5 K/CU MM
LYMPHOCYTES RELATIVE PERCENT: 38.5 % (ref 24–44)
MCH RBC QN AUTO: 26.9 PG (ref 27–31)
MCHC RBC AUTO-ENTMCNC: 30.7 % (ref 32–36)
MCV RBC AUTO: 87.6 FL (ref 78–100)
MONOCYTES ABSOLUTE: 0.3 K/CU MM
MONOCYTES RELATIVE PERCENT: 8.2 % (ref 0–4)
NUCLEATED RBC %: 0 %
PDW BLD-RTO: 13.2 % (ref 11.7–14.9)
PLATELET # BLD: 177 K/CU MM (ref 140–440)
PMV BLD AUTO: 10.4 FL (ref 7.5–11.1)
POTASSIUM SERPL-SCNC: 4.7 MMOL/L (ref 3.5–5.1)
RBC # BLD: 3.87 M/CU MM (ref 4.2–5.4)
SEGMENTED NEUTROPHILS ABSOLUTE COUNT: 1.8 K/CU MM
SEGMENTED NEUTROPHILS RELATIVE PERCENT: 46.4 % (ref 36–66)
SODIUM BLD-SCNC: 139 MMOL/L (ref 135–145)
TOTAL IMMATURE NEUTOROPHIL: 0 K/CU MM
TOTAL NUCLEATED RBC: 0 K/CU MM
WBC # BLD: 3.8 K/CU MM (ref 4–10.5)

## 2022-04-29 PROCEDURE — 6370000000 HC RX 637 (ALT 250 FOR IP): Performed by: INTERNAL MEDICINE

## 2022-04-29 PROCEDURE — 85025 COMPLETE CBC W/AUTO DIFF WBC: CPT

## 2022-04-29 PROCEDURE — G0378 HOSPITAL OBSERVATION PER HR: HCPCS

## 2022-04-29 PROCEDURE — 96376 TX/PRO/DX INJ SAME DRUG ADON: CPT

## 2022-04-29 PROCEDURE — 6360000002 HC RX W HCPCS: Performed by: INTERNAL MEDICINE

## 2022-04-29 PROCEDURE — 96372 THER/PROPH/DIAG INJ SC/IM: CPT

## 2022-04-29 PROCEDURE — 1200000000 HC SEMI PRIVATE

## 2022-04-29 PROCEDURE — 2580000003 HC RX 258: Performed by: INTERNAL MEDICINE

## 2022-04-29 PROCEDURE — 80048 BASIC METABOLIC PNL TOTAL CA: CPT

## 2022-04-29 PROCEDURE — 6360000002 HC RX W HCPCS: Performed by: STUDENT IN AN ORGANIZED HEALTH CARE EDUCATION/TRAINING PROGRAM

## 2022-04-29 PROCEDURE — 94761 N-INVAS EAR/PLS OXIMETRY MLT: CPT

## 2022-04-29 PROCEDURE — 36415 COLL VENOUS BLD VENIPUNCTURE: CPT

## 2022-04-29 PROCEDURE — 99211 OFF/OP EST MAY X REQ PHY/QHP: CPT

## 2022-04-29 PROCEDURE — 99223 1ST HOSP IP/OBS HIGH 75: CPT | Performed by: PSYCHIATRY & NEUROLOGY

## 2022-04-29 PROCEDURE — A4216 STERILE WATER/SALINE, 10 ML: HCPCS | Performed by: INTERNAL MEDICINE

## 2022-04-29 PROCEDURE — 36591 DRAW BLOOD OFF VENOUS DEVICE: CPT

## 2022-04-29 RX ORDER — PROMETHAZINE HYDROCHLORIDE 25 MG/ML
12.5 INJECTION, SOLUTION INTRAMUSCULAR; INTRAVENOUS EVERY 6 HOURS PRN
Status: DISCONTINUED | OUTPATIENT
Start: 2022-04-29 | End: 2022-05-02 | Stop reason: HOSPADM

## 2022-04-29 RX ADMIN — ENOXAPARIN SODIUM 30 MG: 100 INJECTION SUBCUTANEOUS at 21:03

## 2022-04-29 RX ADMIN — SODIUM CHLORIDE: 9 INJECTION, SOLUTION INTRAVENOUS at 08:24

## 2022-04-29 RX ADMIN — SODIUM CHLORIDE, PRESERVATIVE FREE 10 ML: 5 INJECTION INTRAVENOUS at 08:17

## 2022-04-29 RX ADMIN — GABAPENTIN 800 MG: 400 CAPSULE ORAL at 08:08

## 2022-04-29 RX ADMIN — MORPHINE SULFATE 2 MG: 2 INJECTION, SOLUTION INTRAMUSCULAR; INTRAVENOUS at 08:16

## 2022-04-29 RX ADMIN — SODIUM CHLORIDE, PRESERVATIVE FREE 10 ML: 5 INJECTION INTRAVENOUS at 21:05

## 2022-04-29 RX ADMIN — Medication 10 MG: at 21:05

## 2022-04-29 RX ADMIN — ONDANSETRON 4 MG: 2 INJECTION INTRAMUSCULAR; INTRAVENOUS at 06:10

## 2022-04-29 RX ADMIN — OXYCODONE AND ACETAMINOPHEN 1 TABLET: 5; 325 TABLET ORAL at 21:04

## 2022-04-29 RX ADMIN — ONDANSETRON 4 MG: 2 INJECTION INTRAMUSCULAR; INTRAVENOUS at 21:37

## 2022-04-29 RX ADMIN — LEVETIRACETAM 1000 MG: 500 TABLET, FILM COATED ORAL at 21:03

## 2022-04-29 RX ADMIN — LEVOTHYROXINE SODIUM 125 MCG: 0.12 TABLET ORAL at 08:08

## 2022-04-29 RX ADMIN — GABAPENTIN 800 MG: 400 CAPSULE ORAL at 15:37

## 2022-04-29 RX ADMIN — FERROUS SULFATE TAB 325 MG (65 MG ELEMENTAL FE) 325 MG: 325 (65 FE) TAB at 08:08

## 2022-04-29 RX ADMIN — HYDROXYZINE PAMOATE 50 MG: 25 CAPSULE ORAL at 08:08

## 2022-04-29 RX ADMIN — PANTOPRAZOLE SODIUM 40 MG: 40 TABLET, DELAYED RELEASE ORAL at 15:38

## 2022-04-29 RX ADMIN — HYDROMORPHONE HYDROCHLORIDE 0.5 MG: 1 INJECTION, SOLUTION INTRAMUSCULAR; INTRAVENOUS; SUBCUTANEOUS at 18:42

## 2022-04-29 RX ADMIN — LEVETIRACETAM 1000 MG: 500 TABLET, FILM COATED ORAL at 08:08

## 2022-04-29 RX ADMIN — PAROXETINE 45 MG: 10 TABLET, FILM COATED ORAL at 21:04

## 2022-04-29 RX ADMIN — MORPHINE SULFATE 2 MG: 2 INJECTION, SOLUTION INTRAMUSCULAR; INTRAVENOUS at 15:38

## 2022-04-29 RX ADMIN — MORPHINE SULFATE 2 MG: 2 INJECTION, SOLUTION INTRAMUSCULAR; INTRAVENOUS at 03:50

## 2022-04-29 RX ADMIN — ENOXAPARIN SODIUM 30 MG: 100 INJECTION SUBCUTANEOUS at 08:09

## 2022-04-29 RX ADMIN — GABAPENTIN 800 MG: 400 CAPSULE ORAL at 21:04

## 2022-04-29 RX ADMIN — HYDROMORPHONE HYDROCHLORIDE 0.5 MG: 1 INJECTION, SOLUTION INTRAMUSCULAR; INTRAVENOUS; SUBCUTANEOUS at 10:48

## 2022-04-29 RX ADMIN — LORAZEPAM 1 MG: 2 INJECTION INTRAMUSCULAR; INTRAVENOUS at 21:37

## 2022-04-29 RX ADMIN — ONDANSETRON 4 MG: 2 INJECTION INTRAMUSCULAR; INTRAVENOUS at 15:38

## 2022-04-29 RX ADMIN — ESTRADIOL 0.5 MG: 1 TABLET ORAL at 08:08

## 2022-04-29 RX ADMIN — MULTIVITAMIN 15 ML: LIQUID ORAL at 08:04

## 2022-04-29 RX ADMIN — PROMETHAZINE HYDROCHLORIDE 12.5 MG: 25 INJECTION INTRAMUSCULAR; INTRAVENOUS at 03:50

## 2022-04-29 RX ADMIN — PANTOPRAZOLE SODIUM 40 MG: 40 TABLET, DELAYED RELEASE ORAL at 08:08

## 2022-04-29 RX ADMIN — HYDROXYZINE PAMOATE 50 MG: 25 CAPSULE ORAL at 21:04

## 2022-04-29 RX ADMIN — SODIUM CHLORIDE: 9 INJECTION, SOLUTION INTRAVENOUS at 21:37

## 2022-04-29 ASSESSMENT — PAIN DESCRIPTION - ONSET: ONSET: ON-GOING

## 2022-04-29 ASSESSMENT — PAIN DESCRIPTION - ORIENTATION
ORIENTATION: RIGHT

## 2022-04-29 ASSESSMENT — PAIN SCALES - GENERAL
PAINLEVEL_OUTOF10: 10
PAINLEVEL_OUTOF10: 7
PAINLEVEL_OUTOF10: 7
PAINLEVEL_OUTOF10: 9
PAINLEVEL_OUTOF10: 7
PAINLEVEL_OUTOF10: 5
PAINLEVEL_OUTOF10: 7
PAINLEVEL_OUTOF10: 3
PAINLEVEL_OUTOF10: 0

## 2022-04-29 ASSESSMENT — PAIN DESCRIPTION - LOCATION
LOCATION: ABDOMEN
LOCATION: ABDOMEN;FLANK
LOCATION: ABDOMEN

## 2022-04-29 ASSESSMENT — PAIN DESCRIPTION - FREQUENCY: FREQUENCY: CONTINUOUS

## 2022-04-29 ASSESSMENT — PAIN DESCRIPTION - PAIN TYPE: TYPE: ACUTE PAIN

## 2022-04-29 ASSESSMENT — PAIN DESCRIPTION - DESCRIPTORS
DESCRIPTORS: ACHING;CRAMPING;SHARP
DESCRIPTORS: DULL

## 2022-04-29 NOTE — PROGRESS NOTES
4 Eyes Skin Assessment     NAME:  Andre Pope  YOB: 1970  MEDICAL RECORD NUMBER:  [id number]    The patient is being assess for  Admission    I agree that 2 RN's have performed a thorough Head to Toe Skin Assessment on the patient. ALL assessment sites listed below have been assessed. Areas assessed by both nurses:    Head, Face, Ears, Shoulders, Back, Chest, Arms, Elbows, Hands, Sacrum. Buttock, Coccyx, Ischium and Legs. Feet and Heels        Does the Patient have a Wound? Yes wound(s) were present on assessment.  LDA wound assessment was Initiated and completed        Colin Prevention initiated:  Yes   Wound Care Orders initiated:  Yes    Pressure Injury (Stage 3,4, Unstageable, DTI, NWPT, and Complex wounds) if present place consult order under [de-identified] No    New and Established Ostomies if present place consult order under : No      Nurse 1 eSignature: Electronically signed by Archie Alvarenga RN on 4/29/22 at 12:28 AM EDT    **SHARE this note so that the co-signing nurse is able to place an eSignature**    Nurse 2 eSignature: Electronically signed by Sugey Ramirez RN on 4/29/22 at 12:54 AM EDT

## 2022-04-29 NOTE — CARE COORDINATION
Met c pt to initiate discharge planning. Pt states she has been staying with her brother and plans to return upon discharge. Pt states she no longer has [address] and does not feel it is needed at this time, she is being followed closely by Dr. Yariel Mas with podiatry. Pt using crutches for ambulation, has transportation and pcp. Pt denied needs at this time, advised CM available if needs arise.

## 2022-04-29 NOTE — CONSULTS
Neurology Service Consult Note  [facility]  Patient Name: Anoop Cloud  : 1970        Subjective:   Reason for consult: \"Seizure\"  Patient seen and examined. Chart reviewed in detail. 46 y.o. -female with PMH acid reflux, anemia, anxiety, fibromyalgia, COPD, depression, hiatal hernia, lupus, seizure, pseudoseizure, thyroid disease presenting to [facility] for nausea, vomiting, diarrhea for 2- 3 days. Symptoms are associated with abdominal pain. Patient stated that she has a history of pancreatitis and thinks this is the cause. Neurology consulted for breakthrough seizure, as patient has a history of seizures versus pseudoseizures. Patient has seen our neurology previous hospital medicine, however has not seen his outpatient at this time. Patient states that she had having seizures after her initial foot surgery approximately 6 years ago. Patient states that her seizures usually come when there is a traumatic experience happening in her life, increased stress, or surgical procedure. Patient states that her last seizure prior to this admission was approximate 2 weeks ago after she received surgery on her left foot again. Patient states that she usually does not know when she has seizures, as this time her mother was shaking her and she woke up to that motion. Patient does not have evidence of tongue laceration, denies incontinence. Patient does tell me that she has had tongue biting previously, however has never had incontinence. Patient tells me that she is on Keppra 500 mg a day.       Past Medical History:   Diagnosis Date    Acid reflux     Anemia     Anxiety     Arthritis     Hands, Back And Ankles    Bleeding ulcer     \"I Had Ulcers In My Stomach And Colon\"/ per pt on 2019\"they said recently having some blood in my stomach- in July ( )could not find where coming from \"    Bronchitis Last Episode 2014    Chronic back pain     Chronic pain     Sees Dr. Loren Jean COPD (chronic obstructive pulmonary disease) [facility])     Sees Dr. Patrick Amaya Depression     Fibromyalgia Dx 2013    GERD (gastroesophageal reflux disease)     H/O echocardiogram 08/11/2015    EF >55%. LA to be at the upper limit of normal in size. LV hypertrophy with normal LV systolic, but abnormal diastolic function. Normal valvular structures and function.  H/O echocardiogram 08/30/2018    EF 55-60%    Hiatal hernia     History of blood transfusion 09/2015 And 2018    No Reaction To Blood Transfusions Received    History of sleeve gastrectomy     [place] (hard of hearing)     Right Ear    Hx of cardiac catheterization 10/24/2010    Angiographically normal coronary arteries w/ normal LV function and wall motion.  Hx of transesophageal echocardiography (PILO) for monitoring 12/02/2010    EF 55-60%. WNL.    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 name   Gloriae Warden cysts     \"they found that I have a kidney cyst but not sure which side\" per pt on 7/15/2020    Lupus ([address] Dx 2013    \"Rheumatoid Lupus\"    MDRO (multiple drug resistant organisms) resistance     4 months ago chest from mediport    Morbid obesity ([address]     MRSA bacteremia     Nausea     \"Most Of The Time\"    Osteomyelitis of fifth toe of left foot ([address]     Pain management     Sees Dr. Ashleigh Moses, Once A Month    Pancreatitis chronic Dx 2001    Pneumonia Dx 11-15    Seizures ([address]     Shortness of breath on exertion     Shortness of breath on exertion     Sleep apnea     Has CPAP\"no longer use the cpap since lost weight\"    Staph infection Dx 11-15    Left Foot    Staph infection Dx 11-15    \"Left Foot\"    Teeth missing     Upper And Lower    Thyroid disease     Wears glasses     To Read    :   Past Surgical History:   Procedure Laterality Date    APPENDECTOMY  02/1998    Done When Tubes And Ovaries Were Removed    CARDIAC CATHETERIZATION  10/24/2010    CATHETER REMOVAL N/A 7/16/2019    PORT REMOVAL performed by Edgardo Hayes MD at [facility]  08/27/1991    CHOLECYSTECTOMY, LAPAROSCOPIC  1990's    COLONOSCOPY  Last Done In 2000's    DENTAL SURGERY      Teeth Extracted In Past    ENDOSCOPY, COLON, DIAGNOSTIC  Last Done In 2018    FOOT DEBRIDEMENT Left 6/16/2019    FIRST METATARSAL DEBRIDEMENT INCISION AND DRAINAGE. EXCISION OF ULCER.  RESECTION OF BONE. 1ST METATARSAL POWER LAVAGE AND BONE CEMENT performed by Ozzie Santos DPM at [address] [facility] [address] Left 4/15/2022    LEFT FOOT ABSCESS DEBRIDEMENT INCISION AND DRAINAGE, CYST REMOVAL performed by Ozzie Santos DPM at [address] Left Last Done In 2016     Dr. Evelin Edwards, \" About 6 Surgeries Done Because Of Staph Infection\"    HIATAL HERNIA REPAIR N/A 2/12/2019    HERNIA HIATAL LAPAROSCOPIC ROBOTIC performed by Edgardo Hayes MD at [facility]  10/1997    Partial Abdominal Hysterectomy    INSERTION / REMOVAL / REPLACEMENT VENOUS ACCESS CATHETER N/A 8/15/2019    PORT INSERTION performed by Edgardo Hayes MD at [facility]    removed after 6 months    LEG BIOPSY EXCISION Left 3/8/2021    LEFT THIGH LESION BIOPSY EXCISION performed by Edgardo Hayes MD at [facility], PARTIAL Left 2008    Benign    OTHER SURGICAL HISTORY  02/1998    \"Tubes And Ovaries Removed, Appendectomy Also Done\"    OTHER SURGICAL HISTORY  Last Done 7-15-16    Mediport Insertion \"Total Of Six Done, Removed Last Mediport In 2014\"    PORT SURGERY N/A 1/15/2020    PORT REMOVAL performed by Edgardo Hayes MD at [facility] N/A 7/6/2020 PORT INSERTION performed by Mac Vaz MD at [facility] 8/3/2021    MEDIPORT REPLACEMENT performed by Mac Vaz MD at [address] [place] [address] N/A 8/16/2021    GASTRIC BYPASS RUFINO-EN-Y LAPAROSCOPIC ROBOTIC, LYSIS OF ADHESIONS performed by Mac Vaz MD at [address]  N/A 2/12/2019    GASTRECTOMY SLEEVE LAPAROSCOPIC ROBOTIC performed by Mac Vaz MD at [address] [place] [address]  08/27/2018    UPPER GASTROINTESTINAL ENDOSCOPY N/A 4/2/2019    EGD CONTROL HEMORRHAGE with epi injection at bleeding site performed by Mac Vaz MD at [address] [place] [address] 6/19/2019    EGD DIAGNOSTIC ONLY performed by Mac Vaz MD at [address] [place] [address] N/A 7/22/2019    EGD DIAGNOSTIC ONLY performed by Murphy Scott MD at [address] [place] [address] N/A 10/14/2019    EGD DIAGNOSTIC ONLY performed by Mac Vaz MD at [address] [place] [address] N/A 7/17/2020    EGJ DILATATION BALLOON WITH 18-20 MM BALLOON, DILATED TO 20 MM. performed by Mac Vaz MD at [address] [place] [address] N/A 5/28/2021    EGD BIOPSY performed by Mac Vaz MD at [facility] ENDOSCOPY     Medications:  Scheduled Meds:   [Held by provider] atenolol  25 mg Oral Daily    [Held by provider] cloNIDine  0.1 mg Oral BID    estradiol  0.5 mg Oral Daily    ferrous sulfate  325 mg Oral Daily with breakfast    gabapentin  800 mg Oral TID    hydrOXYzine  50 mg Oral BID    levETIRAcetam  1,000 mg Oral BID    levothyroxine  125 mcg Oral Daily    pantoprazole  40 mg Oral BID    PARoxetine  45 mg Oral Nightly    sodium chloride flush  10 mL IntraVENous 2 times per day    enoxaparin  30 mg SubCUTAneous BID    CENTRUM/CERTA-LILLY with minerals oral  15 mL Oral Daily Continuous Infusions:   sodium chloride      sodium chloride 100 mL/hr at 04/29/22 0824     PRN Meds:.promethazine, HYDROmorphone, albuterol, meclizine, melatonin, oxyCODONE-acetaminophen, tiZANidine, sodium chloride flush, sodium chloride, ondansetron **OR** ondansetron, bisacodyl, acetaminophen **OR** acetaminophen, LORazepam, LORazepam, morphine    Allergies   Allergen Reactions    Aspirin Palpitations     \"My Heart Rate Elevates\"    Compazine [Prochlorperazine] Rash    Shellfish-Derived Products Swelling    Toradol [Ketorolac Tromethamine] Rash    Haldol [Haloperidol]      Shaking      Reglan [Metoclopramide] Itching     Social History     Socioeconomic History    Marital status:      Spouse name: Not on file    Number of children: Not on file    Years of education: Not on file    Highest education level: Not on file   Occupational History    Not on file   Tobacco Use    Smoking status: Never Smoker    Smokeless tobacco: Never Used   Vaping Use    Vaping Use: Never used   Substance and Sexual Activity    Alcohol use: No     Alcohol/week: 0.0 standard drinks    Drug use: No    Sexual activity: Not Currently   Other Topics Concern    Not on file   Social History Narrative    Not on file     Social Determinants of Health     Financial Resource Strain: Low Risk     Difficulty of Paying Living Expenses: Not hard at all   Food Insecurity: No Food Insecurity    Worried About Running Out of Food in the Last Year: Never true    Tyrese of Food in the Last Year: Never true   Transportation Needs:     Lack of Transportation (Medical): Not on file    Lack of Transportation (Non-Medical):  Not on file   Physical Activity:     Days of Exercise per Week: Not on file    Minutes of Exercise per Session: Not on file   Stress:     Feeling of Stress : Not on file   Social Connections:     Frequency of Communication with Friends and Family: Not on file    Frequency of Social Gatherings with Friends and Family: Not on file    Attends Jainism Services: Not on file    Active Member of Clubs or Organizations: Not on file    Attends Club or Organization Meetings: Not on file    Marital Status: Not on file   Intimate Partner Violence:     Fear of Current or Ex-Partner: Not on file    Emotionally Abused: Not on file    Physically Abused: Not on file    Sexually Abused: Not on file   Housing Stability:     Unable to Pay for Housing in the Last Year: Not on file    Number of [place] in the Last Year: Not on file    Unstable Housing in the Last Year: Not on file      Family History   Problem Relation Age of Onset    Diabetes Mother         \"Borderline Diabetes\"    High Blood Pressure Mother     Obesity Mother     Arthritis Mother     Heart Disease Mother     High Cholesterol Mother     Vision Loss Mother     Diabetes Father     High Blood Pressure Father     Asthma Father     Cancer Father         prostate cancer    High Blood Pressure Brother     Asthma Son     Vision Loss Son     Lupus Daughter     Other Daughter         \"Alot Of Female Problems\"         ROS (10 systems)  In addition to that documented in the HPI above, the additional ROS was obtained:  Constitutional: Denies fevers or chills  Eyes: Denies vision changes  ENMT: Denies sore throat  CV: Denies chest pain  Resp: Denies SOB  GI: Denies vomiting or diarrhea  : Denies painful urination  MSK: Denies recent trauma  Skin: Denies new rashes  Neuro: Denies new numbness or tingling or weakness, c/ of recent breakthrough seizure.   Endocrine: Denies unexpected weight loss  Heme: Denies bleeding disorders    Physical Exam:         Wt Readings from Last 3 Encounters:   04/29/22 251 lb 3.2 oz (113.9 kg)   04/18/22 238 lb (108 kg)   04/12/22 238 lb (108 kg)     Temp Readings from Last 3 Encounters:   04/29/22 97.3 °F (36.3 °C) (Oral)   04/18/22 97.7 °F (36.5 °C) (Temporal)   04/16/22 97.3 °F (36.3 °C) (Infrared)     BP Readings from Last 3 Encounters:   04/29/22 (!) 118/92   04/18/22 135/75   04/16/22 111/69     Pulse Readings from Last 3 Encounters:   04/29/22 58   04/18/22 61   04/16/22 61        Gen: A&O x 4, NAD, cooperative  HEENT: NC/AT, EOMI, PERRL, mmm, neck supple, no meningeal signs; Heart: Sinus rhythm  Lungs: Respirations Unlabored  Ext: no edema, no calf tenderness b/l  Psych: normal mood and affect  Skin: no rashes or lesions    NEUROLOGIC EXAM:    Mental Status: A&O to self, location, month and year, NAD, speech clear, language fluent, repetition and naming intact, follows commands appropriately    Cranial Nerve Exam:   CN II-XII: PERRL, VFF, no nystagmus, no gaze paresis, sensation V1-V3 intact b/l, muscles of facial expression symmetric; hearing intact to conversational tone, palate elevates symmetrically, shoulder elevation symmetric and tongue protrudes midline with movement side to side. Motor Exam:       Strength 5/5 UE's/LE's b/l  Tone and bulk normal   No pronator drift    Deep Tendon Reflexes: 2/4 biceps, triceps, brachioradialis, patellar, and achilles b/l; flexor plantar responses b/l    Sensation: Intact light touch/pinprick/vibration UE's/LE's b/l    Coordination/Cerebellum:       Tremors--involuntary subtle jerking motion. Appears nonphysiological.      Rapidly alternating movements: no dysdiadochokinesia b/l                Heel-to-Shin: no dysmetria b/l      Finger-to-Nose: no dysmetria b/l    Gait and stance:      Gait: deferred      LABS:        CBC:   Recent Labs     04/28/22  1714   WBC 4.0   RBC 4.02*   HGB 10.9*   HCT 34.9*      MCV 86.8     BMP:    Recent Labs     04/28/22  1714      K 3.9      CO2 24   BUN 15   CREATININE 0.8   GLUCOSE 89   CALCIUM 10.1       IMAGING:    CTH      Impression   Head CT: No acute intracranial abnormality.  No acute territorial infarction,   intracranial hemorrhage or mass lesion.           Above imaging personally reviewed in detail. ASSESSMENT/PLAN:     46 y.o. -female with PMH acid reflux, anemia, anxiety, fibromyalgia, COPD, depression, hiatal hernia, lupus, seizure, pseudoseizure, thyroid disease presenting to [facility] for nausea, vomiting, diarrhea, and abdominal pain for 2- 3 days prior to arrival. Neurology consulted for breakthrough seizure, as patient has a history of seizures versus pseudoseizures. Minimal involuntary jerking seen intermittently during exam, however appears nonphysiological in nature. Breakthrough seizure versus pseudoseizure in the setting of recent surgical procedure. --Continue Keppra 500 mg BID  --Maintain seizure precautions  --Seizure precautions discussed as follows:  Do not drive until cleared by your neurologist. No tub baths or swimming. No operating heavy or dangerous equipment. Do not use ladders. -- Follow-up as outpatient for 24-hour EEG monitoring.  --CT head as above  Follow-up in office in 4 to 6 weeks. Nothing further from neurology standpoint, we will sign off. If you have any further questions please do not hesitate to contact us. Thank you for your consultation        Patient discussed with attending physician Dr. Alberto Carrillo    Thank you for allowing us to participate in the care of your patient. If there are any questions regarding evaluation please feel free to contact us. (Please note that portions of this note were completed with a voice recognition program.  Efforts were made to edit the dictations but occasionally words are mistranscribed.)      Carmen Ling APRN - CNP, 4/29/2022       ------------------------------------    Attending Note:  I have rounded on this patient with Maria L Tillman NP. I have reviewed the chart and we have discussed this case in detail. The patient was seen and examined by myself. Pertinent labs and imaging have been personally reviewed. Our findings and impressions were discussed with the patient.  I concur with the Nurse Practitioner's assessment and plan.       Tevin Moses DO 4/29/2022 11:50 PM

## 2022-04-29 NOTE — CONSULTS
[facility] Wound Ostomy Continence Nurse  Consult Note       Andre Pope  AGE: 46 y.o. GENDER: female  : 1970  TODAY'S DATE:  2022    Subjective:     Reason for CWOCN Evaluation and Assessment: wound assessment      Danisha Recinos is a 46 y.o. female referred by:   [x] Physician  [] Nursing  [] Other:     Wound Identification:  Wound Type: surgical  Contributing Factors: decreased mobility and obesity        PAST MEDICAL HISTORY        Diagnosis Date    Acid reflux     Anemia     Anxiety     Arthritis     Hands, Back And Ankles    Bleeding ulcer     \"I Had Ulcers In My Stomach And Colon\"/ per pt on 2019\"they said recently having some blood in my stomach- in July ( )could not find where coming from \"    Bronchitis Last Episode     Chronic back pain     [facility]     [facility]    COPD (chronic obstructive pulmonary disease) ([facility] [address]     Sees Dr. Patrick Amaya Depression     Fibromyalgia Dx     GERD (gastroesophageal reflux disease)     H/O echocardiogram 2015    EF >55%. LA to be at the upper limit of normal in size. LV hypertrophy with normal LV systolic, but abnormal diastolic function. Normal valvular structures and function.  H/O echocardiogram 2018    EF 55-60%    Hiatal hernia     History of blood transfusion 2015 And     No Reaction To Blood Transfusions Received    History of sleeve gastrectomy     [place] (hard of hearing)     Right Ear    Hx of cardiac catheterization 10/24/2010    Angiographically normal coronary arteries w/ normal LV function and wall motion.  Hx of transesophageal echocardiography (PILO) for monitoring 2010    EF 55-60%. WNL.    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 name    Kidney cysts     Augustin Colbert found that I have a kidney cyst but not sure which side\" per pt on 7/15/2020    Lupus ([address] Dx 2013    \"Rheumatoid Lupus\"    MDRO (multiple drug resistant organisms) resistance     4 months ago chest from [facility]    Morbid obesity ([address]     MRSA bacteremia     Nausea     \"Most Of The Time\"    Osteomyelitis of fifth toe of left foot ([address]     Pain management     Sees Dr. Carrie Guerin, Once A Month    Pancreatitis chronic Dx 2001    Pneumonia Dx 11-15    Seizures ([address]     Shortness of breath on exertion     Shortness of breath on exertion     Sleep apnea     Has CPAP\"no longer use the cpap since lost weight\"    Staph infection Dx 11-15    Left Foot    Staph infection Dx 11-15    \"Left Foot\"    Teeth missing     Upper And Lower    Thyroid disease     Wears glasses     To Read       PAST SURGICAL HISTORY    Past Surgical History:   Procedure Laterality Date    APPENDECTOMY  02/1998    Done When Tubes And Ovaries Were Removed    CARDIAC CATHETERIZATION  10/24/2010    CATHETER REMOVAL N/A 7/16/2019    PORT REMOVAL performed by James Modi MD at [address] [place] [address],[address]  08/27/1991    CHOLECYSTECTOMY, LAPAROSCOPIC  1990's    COLONOSCOPY  Last Done In 2000's    DENTAL SURGERY      Teeth Extracted In Past    ENDOSCOPY, COLON, DIAGNOSTIC  Last Done In 2018    FOOT DEBRIDEMENT Left 6/16/2019    FIRST METATARSAL DEBRIDEMENT INCISION AND DRAINAGE. EXCISION OF ULCER.  RESECTION OF BONE. 1ST METATARSAL POWER LAVAGE AND BONE CEMENT performed by Amber Lozada DPM at [address] Left 4/15/2022    LEFT FOOT ABSCESS DEBRIDEMENT INCISION AND DRAINAGE, CYST REMOVAL performed by Amber Lozada DPM at [address] Left Last Done In 2016     Dr. Marcial Epps, \" About 6 Surgeries Done Because Of Staph Infection\"    HIATAL HERNIA REPAIR N/A 2/12/2019    HERNIA HIATAL LAPAROSCOPIC ROBOTIC performed by Monica Pina MD at [address] [place] [address]  10/1997    Partial Abdominal Hysterectomy    INSERTION / REMOVAL / REPLACEMENT VENOUS ACCESS CATHETER N/A 8/15/2019    PORT INSERTION performed by Monica Pina MD at [facility]    removed after 6 months    LEG BIOPSY EXCISION Left 3/8/2021    LEFT THIGH LESION BIOPSY EXCISION performed by Monica Pina MD at [facility], PARTIAL Left 2008    Benign    OTHER SURGICAL HISTORY  02/1998    \"Tubes And Ovaries Removed, Appendectomy Also Done\"    OTHER SURGICAL HISTORY  Last Done 7-15-16    Mediport Insertion \"Total Of Six Done, Removed Last Mediport In 2014\"    PORT SURGERY N/A 1/15/2020    PORT REMOVAL performed by Monica Pina MD at [address] [place] [address] N/A 7/6/2020    PORT INSERTION performed by Monica Pina MD at [address] [place] [address] Left 8/3/2021    MEDIPORT REPLACEMENT performed by Monica Pina MD at [address] [place] [address] N/A 8/16/2021    GASTRIC BYPASS RUFINO-EN-Y LAPAROSCOPIC ROBOTIC, LYSIS OF ADHESIONS performed by Monica Pina MD at [address] N/A 2/12/2019    GASTRECTOMY SLEEVE LAPAROSCOPIC ROBOTIC performed by Monica Pina MD at [facility]  08/27/2018    UPPER GASTROINTESTINAL ENDOSCOPY N/A 4/2/2019    EGD CONTROL HEMORRHAGE with epi injection at bleeding site performed by Monica Pina MD at [address] 6/19/2019    EGD DIAGNOSTIC ONLY performed by Monica Pina MD at [address] N/A 7/22/2019    EGD DIAGNOSTIC ONLY performed by Lynn Paz MD at [address] 10/14/2019    EGD DIAGNOSTIC ONLY performed by Monica Pina MD at [address] N/A 7/17/2020    EGJ DILATATION BALLOON WITH 18-20 MM BALLOON, DILATED TO 20 MM. performed by Alondra Raman MD at [facility] N/A 5/28/2021    EGD BIOPSY performed by Alondra Raman MD at [facility]    Family History   Problem Relation Age of Onset    Diabetes Mother         \"Borderline Diabetes\"    High Blood Pressure Mother     Obesity Mother     Arthritis Mother     Heart Disease Mother     High Cholesterol Mother     Vision Loss Mother     Diabetes Father     High Blood Pressure Father     Asthma Father     Cancer Father         prostate cancer    High Blood Pressure Brother     Asthma Son     Vision Loss Son     Lupus Daughter     Other Daughter         \"Alot Of Female Problems\"       SOCIAL HISTORY    Social History     Tobacco Use    Smoking status: Never Smoker    Smokeless tobacco: Never Used   Vaping Use    Vaping Use: Never used   Substance Use Topics    Alcohol use: No     Alcohol/week: 0.0 standard drinks    Drug use: No       ALLERGIES    Allergies   Allergen Reactions    Aspirin Palpitations     \"My Heart Rate Elevates\"    Compazine [Prochlorperazine] Rash    Shellfish-Derived Products Swelling    Toradol [Ketorolac Tromethamine] Rash    Haldol [Haloperidol]      Shaking      Reglan [Metoclopramide] Itching       MEDICATIONS    No current facility-administered medications on file prior to encounter.      Current Outpatient Medications on File Prior to Encounter   Medication Sig Dispense Refill    ibuprofen (ADVIL;MOTRIN) 800 MG tablet Take 1 tablet by mouth 3 times daily (with meals) 30 tablet 0    levETIRAcetam (KEPPRA) 1000 MG tablet Take 1 tablet by mouth twice daily 60 tablet 0    cloNIDine (CATAPRES) 0.1 MG tablet Take 1 tablet by mouth twice daily 60 tablet 0    atenolol (TENORMIN) 25 MG tablet Take 1 tablet by mouth once daily 30 tablet 2    pantoprazole (PROTONIX) 40 MG tablet Take 1 tablet by mouth 2 times daily 60 tablet 2    promethazine (PHENERGAN) 25 MG tablet Take 1 tablet by mouth every 8 hours as needed for Nausea 30 tablet 0    hydrOXYzine (VISTARIL) 50 MG capsule Take 1 capsule by mouth 2 times daily 30 capsule 2    levothyroxine (SYNTHROID) 125 MCG tablet Take 1 tablet by mouth Daily 30 tablet 2    PARoxetine (PAXIL) 30 MG tablet Take 1.5 tablets by mouth nightly 45 tablet 3    estradiol (ESTRACE) 0.5 MG tablet Take 1 tablet by mouth daily 30 tablet 2    benzocaine (ORAJEL) 10 % mucosal gel Apply to the ulcer 2-3 times a day 1 each 0    albuterol (PROVENTIL) (2.5 MG/3ML) 0.083% nebulizer solution Take 3 mLs by nebulization every 6 hours as needed for Wheezing 120 each 0    albuterol sulfate  (90 Base) MCG/ACT inhaler Inhale 2 puffs into the lungs 4 times daily 1 each 0    oxyCODONE-acetaminophen (PERCOCET) 5-325 MG per tablet TAKE 1 TABLET BY MOUTH EVERY 6 HOURS AS NEEDED FOR 28 DAYS      NARCAN 4 MG/0.1ML LIQD nasal spray USE AS DIRECTED AS NEEDED FOR SUSPECTED OPIOID OVERDOSE      ondansetron (ZOFRAN ODT) 4 MG disintegrating tablet Take 1 tablet by mouth every 4 hours as needed for Nausea or Vomiting 30 tablet 3    scopolamine (TRANSDERM-SCOP) transdermal patch APPLY 1 PATCH TRANSDERMALLY TO THE SKIN BEHIND THE EAR ONCE EVERY 3 DAYS AS NEEDED 10 patch 0    Melatonin 10 MG TABS Take 10-20 mg by mouth nightly as needed      Cyanocobalamin (VITAMIN B 12 PO) Take 1 tablet by mouth daily      meclizine (ANTIVERT) 25 MG tablet Take 25 mg by mouth 3 times daily as needed for Dizziness      betamethasone valerate (VALISONE) 0.1 % ointment APPLY OINTMENT TO AFFECTED AREA TWICE DAILY TO HANDS AND ELBOWS FOR 21 DAYS      potassium gluconate 550 mg tablet Take 1 tablet by mouth daily      tiZANidine (ZANAFLEX) 4 MG tablet Take 4 mg by mouth every 8 hours as needed       ferrous sulfate (IRON 325) 325 (65 Fe) MG tablet Take 1 tablet by mouth daily (with breakfast) 90 tablet 5    Cholecalciferol (VITAMIN D3) 5000 units TABS Take 1 tablet by mouth daily      Multiple Vitamin (MVI, BARIATRIC ADVANTAGE MULTI-FORMULA, CHEW TAB) Take 1 tablet by mouth daily 30 tablet 5    Calcium Citrate-Vitamin D (CALCIUM + VIT D, BARIATRIC ADVANTAGE, CHEWABLE TABLET) Take 1 tablet by mouth 2 times daily 120 tablet 5    gabapentin (NEURONTIN) 800 MG tablet Take 800 mg by mouth 3 times daily           Objective:      BP (!) 118/92   Pulse 58   Temp 97.3 °F (36.3 °C) (Oral)   Resp 19   Ht 5' 4\" (1.626 m)   Wt 251 lb 3.2 oz (113.9 kg)   SpO2 100%   BMI 43.12 kg/m²   Colin Risk Score: Colin Scale Score: 20    LABS    CBC:   Lab Results   Component Value Date    WBC 4.0 04/28/2022    RBC 4.02 04/28/2022    HGB 10.9 04/28/2022    HCT 34.9 04/28/2022    MCV 86.8 04/28/2022    MCH 27.1 04/28/2022    MCHC 31.2 04/28/2022    RDW 13.2 04/28/2022     04/28/2022    MPV 10.5 04/28/2022     CMP:    Lab Results   Component Value Date     04/28/2022    K 3.9 04/28/2022     04/28/2022    CO2 24 04/28/2022    BUN 15 04/28/2022    CREATININE 0.8 04/28/2022    GFRAA >60 04/28/2022    LABGLOM >60 04/28/2022    GLUCOSE 89 04/28/2022    PROT 6.5 04/28/2022    PROT 6.5 11/18/2011    LABALBU 4.0 04/28/2022    LABALBU 8 11/18/2015    CALCIUM 10.1 04/28/2022    BILITOT 0.2 04/28/2022    ALKPHOS 144 04/28/2022    AST 14 04/28/2022    ALT 11 04/28/2022     Albumin:    Lab Results   Component Value Date    LABALBU 4.0 04/28/2022    LABALBU 8 11/18/2015     PT/INR:    Lab Results   Component Value Date    PROTIME 10.4 02/12/2021    PROTIME 11.8 01/25/2021    INR 0.86 02/12/2021     HgBA1c:    Lab Results   Component Value Date    LABA1C 5.2 08/03/2021         Assessment:     Patient Active Problem List   Diagnosis    Fibromyalgia    Lupus (systemic lupus erythematosus) (HCC)    Chronic pancreatitis (HCC)    HTN (hypertension)    Frequent UTI    Gastroesophageal reflux disease without esophagitis    Chronic depression    Fatty liver disease, nonalcoholic    Arthritis    Bilateral low back pain with left-sided sciatica    Hiatal hernia    Pseudoseizure    Chronic pain syndrome    Drug-seeking/Aberrant behavior    Lymph node disorder    Morbid obesity with BMI of 40.0-44.9, adult (HCC)    Iron deficiency anemia    History of Jet-en-Y gastric bypass    Anxiety    RUQ pain    Discoloration of skin of flank resembling ecchymosis    Chest pain of uncertain etiology    Cellulitis of left thigh    Malabsorption    COPD (chronic obstructive pulmonary disease) (HCC)    Chronic nausea    Hypothyroidism due to acquired atrophy of thyroid    Vitamin D deficiency    Irritable bowel syndrome    Malabsorption due to intolerance, not elsewhere classified    Port-A-Cath in place    Pseudoseizures ([facility] [address]    Diarrhea       Measurements:       Response to treatment:  Well tolerated by patient. Pain Assessment:  Severity:  none  Quality of pain: na  Wound Pain Timing/Severity: na  Premedicated: no    Plan:     Plan of Care:       Pt in bed. Agreeable to wound assessment. I/D Left foot abscess per Dr. Amairani Crystal on 4/15/22. Stated has followed up in office and sutures to come out next week. Left foot incision well approximated with sutures. WNL. No drainage. Recommend to keep covered since on plantar foot to keep clean and dry. Picture taken. Cleansed with NS. Abd and conform applied. Tolerated well. Pt is generally not at risk for skin breakdown VIPIN hemphill. Updated nurse. Specialty Bed Required : no  [] Low Air Loss   [] Pressure Redistribution  [] Fluid Immersion  [] Bariatric  [] Total Pressure Relief  [] Other:     Discharge Plan:  Placement for patient upon discharge: tbd  Hospice Care: no  Patient appropriate for Outpatient [address]: follow up with Dr. Amairani Crystal    Patient/Caregiver Teaching:  Level of patient/caregiver understanding able to:   Voiced understanding.         Electronically signed by Dionte García Nadine Lara on 4/29/2022 at 11:41 AM

## 2022-04-29 NOTE — H&P
History and Physical      Name:  Mague Lowery /Age/Sex: 1970  (46 y.o. female)   MRN & CSN:  [id number] & [id number] Encounter Date/Time: 2022 9:28 PM EDT   Location:  [facility]20 PCP: Spenser Johnson MD, MD       Hospital Day: 1    Assessment and Plan:   Mague Lowery is a 46 y.o. female who presents with       N/V/D possibly d/t gastroenteritis/colitis  - CTAB noted  - C. Diff stool study  - GI diarrhea panel  - cipro/flagyl if no improvement in diarrhea  - IV hydration, anti-emetic      Breakthrough seizure in setting of history of seizures/pseudoseizures  Neuro consult  Neuro check every 4 hours  No focal neurological deficit on examination  C/w Keppra  Seizure precautions  - ativan PRN    Hypertension, will hold current home medications  Hypothyroidism, continue current home medications  History of sleeve gastrectomy, continue current vitamins  GERD, continue Protonix  Nausea, continue antiemetics  Chronic pain, she does see pain management for this. D/w ED provider  Code status Full  DVT PPx Lovenox       History of Present Illness:     Mague Lowery is a 46 y.o. female p/w abdominal pain, nausea vomiting has been going on for 3 days. Patient states that she has had pancreatitis in the past, feels similar. Patient is also complaining of lightheadedness as if she is dehydrated, states that she was unable to keep her Percocet down at home, and her seizure medications. Patient states that that is when she feels like she had a seizure in the morning at 8 AM.  Patient also states that she had a seizure couple weeks ago after her foot surgery. Patient otherwise denies any complaints of fevers, diarrhea and has no other complaints. Review of Systems: Need 10 Elements       Pt otherwise has no complaints of CP, SOB, dizziness,  dysuria, joint pains, rash/boils, cough or fevers.        Objective:   No intake or output data in the 24 hours ending 22   Vitals:   Vitals: 04/28/22 1520 04/28/22 1752 04/28/22 1930   BP: (!) 100/56 119/64 (!) 106/50   Pulse: 63 62 60   Resp: 16 19 18   Temp: 98.6 °F (37 °C)  98.9 °F (37.2 °C)   TempSrc: Oral  Oral   SpO2: 100% 98% 98%   Weight: 240 lb (108.9 kg)     Height: 5' 4\" (1.626 m)         Medications Prior to Admission     Prior to Admission medications    Medication Sig Start Date End Date Taking?  Authorizing Provider   ibuprofen (ADVIL;MOTRIN) 800 MG tablet Take 1 tablet by mouth 3 times daily (with meals) 4/16/22   ElijahmetMICHAEL Morrow NP   levETIRAcetam (KEPPRA) 1000 MG tablet Take 1 tablet by mouth twice daily 4/14/22   Eloisa Lucas, MD   cloNIDine (CATAPRES) 0.1 MG tablet Take 1 tablet by mouth twice daily 4/14/22   Eloisa Lucas, MD   atenolol (TENORMIN) 25 MG tablet Take 1 tablet by mouth once daily 4/6/22   Eloisa Lucas, MD   pantoprazole (PROTONIX) 40 MG tablet Take 1 tablet by mouth 2 times daily 4/6/22   Eloisa Lucas, MD   promethazine (PHENERGAN) 25 MG tablet Take 1 tablet by mouth every 8 hours as needed for Nausea 4/6/22   Eloisa Lucas, MD   hydrOXYzine (VISTARIL) 50 MG capsule Take 1 capsule by mouth 2 times daily 4/6/22   Eloisa Lucas, MD   levothyroxine (SYNTHROID) 125 MCG tablet Take 1 tablet by mouth Daily 2/7/22   Eloisa Lucas, MD   PARoxetine (PAXIL) 30 MG tablet Take 1.5 tablets by mouth nightly 2/7/22   Eloisa Lucas, MD   estradiol (ESTRACE) 0.5 MG tablet Take 1 tablet by mouth daily 12/20/21   Eloisa Lucas, MD   benzocaine (ORAJEL) 10 % mucosal gel Apply to the ulcer 2-3 times a day 9/16/21   Eloisa Lucas, MD   albuterol (PROVENTIL) (2.5 MG/3ML) 0.083% nebulizer solution Take 3 mLs by nebulization every 6 hours as needed for Wheezing 9/9/21   Gregorio Andrea MD   albuterol sulfate  (90 Base) MCG/ACT inhaler Inhale 2 puffs into the lungs 4 times daily 9/9/21   Gregorio Andrea MD   oxyCODONE-acetaminophen (PERCOCET) 5-325 MG per tablet TAKE 1 TABLET BY MOUTH EVERY 6 HOURS AS NEEDED FOR 28 DAYS 8/28/21   Historical Provider, MD   NARCAN 4 MG/0.1ML LIQD nasal spray USE AS DIRECTED AS NEEDED FOR SUSPECTED OPIOID OVERDOSE 8/20/21   Historical Provider, MD   ondansetron (ZOFRAN ODT) 4 MG disintegrating tablet Take 1 tablet by mouth every 4 hours as needed for Nausea or Vomiting 8/18/21   Tamiko Powell MD   scopolamine (TRANSDERM-SCOP) transdermal patch APPLY 1 PATCH TRANSDERMALLY TO THE SKIN BEHIND THE EAR ONCE EVERY 3 DAYS AS NEEDED 7/4/21   Denies Kiser PA-C   Melatonin 10 MG TABS Take 10-20 mg by mouth nightly as needed    Historical Provider, MD   Cyanocobalamin (VITAMIN B 12 PO) Take 1 tablet by mouth daily    Historical Provider, MD   meclizine (ANTIVERT) 25 MG tablet Take 25 mg by mouth 3 times daily as needed for Dizziness    Historical Provider, MD   betamethasone valerate (VALISONE) 0.1 % ointment APPLY OINTMENT TO AFFECTED AREA TWICE DAILY TO HANDS AND ELBOWS FOR 21 DAYS 3/26/21   Historical Provider, MD   potassium gluconate 550 mg tablet Take 1 tablet by mouth daily 3/25/21   Historical Provider, MD   tiZANidine (ZANAFLEX) 4 MG tablet Take 4 mg by mouth every 8 hours as needed  3/3/21   Historical Provider, MD   ferrous sulfate (IRON 325) 325 (65 Fe) MG tablet Take 1 tablet by mouth daily (with breakfast) 10/23/20   Tamiko Powell MD   Cholecalciferol (VITAMIN D3) 5000 units TABS Take 1 tablet by mouth daily    Historical Provider, MD   Multiple Vitamin (MVI, BARIATRIC ADVANTAGE MULTI-FORMULA, CHEW TAB) Take 1 tablet by mouth daily 2/22/19   Tamiko Powell MD   Calcium Citrate-Vitamin D (CALCIUM + VIT D, BARIATRIC ADVANTAGE, CHEWABLE TABLET) Take 1 tablet by mouth 2 times daily 2/22/19   Tamiko Powell MD   gabapentin (NEURONTIN) 800 MG tablet Take 800 mg by mouth 3 times daily    Historical Provider, MD       Physical Exam: Need 8 Elements   General: NAD   Eyes: EOMI  ENT: neck supple  Cardiovascular: Regular rate.  NO edema  Respiratory: Symmetrical expansion, Gastrointestinal: Soft, non tender  Genitourinary: no suprapubic tenderness  Skin: warm, dry  Neuro: Alert. Oriented  Psych: Mood appropriate. Past Medical History:   PMHx   Past Medical History:   Diagnosis Date    Acid reflux     Anemia     Anxiety     Arthritis     Hands, Back And Ankles    Bleeding ulcer 2014    \"I Had Ulcers In My Stomach And Colon\"/ per pt on 8/12/2019\"they said recently having some blood in my stomach- in July ( 2019)could not find where coming from \"    Bronchitis Last Episode 2014    Chronic back pain     Chronic pain     Sees Dr. Lauren Carrington COPD (chronic obstructive pulmonary disease) ([facility] [address]     Sees Dr. Korey Salcedo Depression     Fibromyalgia Dx 2013    GERD (gastroesophageal reflux disease)     H/O echocardiogram 08/11/2015    EF >55%. LA to be at the upper limit of normal in size. LV hypertrophy with normal LV systolic, but abnormal diastolic function. Normal valvular structures and function.  H/O echocardiogram 08/30/2018    EF 55-60%    Hiatal hernia     History of blood transfusion 09/2015 And 2018    No Reaction To Blood Transfusions Received    History of sleeve gastrectomy     [place] (hard of hearing)     Right Ear    Hx of cardiac catheterization 10/24/2010    Angiographically normal coronary arteries w/ normal LV function and wall motion.  Hx of transesophageal echocardiography (PILO) for monitoring 12/02/2010    EF 55-60%. WNL.    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 name   Matt Span cysts     \"they found that I have a kidney cyst but not sure which side\" per pt on 7/15/2020    Lupus ([facility] [address] Dx 2013    \"Rheumatoid Lupus\"    MDRO (multiple drug resistant organisms) resistance     4 months ago [facility] from [facility]    Morbid obesity ([facility] [address]     MRSA bacteremia     Nausea     \"Most Of The Time\"    Osteomyelitis of fifth toe of left foot (HCC)     Pain management     Sees Dr. Ceci Klein, Once A Month    Pancreatitis chronic Dx 2001    Pneumonia Dx 11-15    Seizures ([facility] [address]     Shortness of breath on exertion     Shortness of breath on exertion     Sleep apnea     Has CPAP\"no longer use the cpap since lost weight\"    Staph infection Dx 11-15    Left Foot    Staph infection Dx 11-15    \"Left Foot\"    Teeth missing     Upper And Lower    Thyroid disease     Wears glasses     To Read     PSHX:  has a past surgical history that includes Lung removal, partial (Left, );  section (1991); Foot surgery (Left, Last Done In ); Dental surgery; Cholecystectomy, laparoscopic (5140'R); other surgical history (1998); other surgical history (Last Done 7-15-16); Tonsillectomy and adenoidectomy (); Appendectomy (1998); Upper gastrointestinal endoscopy (2018); Lap Band (~); Hysterectomy (10/1997); Endoscopy, colon, diagnostic (Last Done In ); Colonoscopy (Last Done In ); Cardiac catheterization (10/24/2010); Sleeve Gastrectomy (N/A, 2019); hiatal hernia repair (N/A, 2019); Upper gastrointestinal endoscopy (N/A, 2019); Foot Debridement (Left, 2019); Upper gastrointestinal endoscopy (N/A, 2019); Catheter Removal (N/A, 2019); Upper gastrointestinal endoscopy (N/A, 2019); INSERTION / REMOVAL / REPLACEMENT VENOUS ACCESS CATHETER (N/A, 8/15/2019); Upper gastrointestinal endoscopy (N/A, 10/14/2019); [address] Surgery (N/A, 1/15/2020); [address] Surgery (N/A, 2020); Upper gastrointestinal endoscopy (N/A, 2020); Leg biopsy excision (Left, 3/8/2021); Upper gastrointestinal endoscopy (N/A, 2021); Port Surgery (Left, 8/3/2021);  Jet-en-Y Gastric Bypass (N/A, 2021); and Foot Debridement (Left, 4/15/2022). Allergies: Allergies   Allergen Reactions    Aspirin Palpitations     \"My Heart Rate Elevates\"    Compazine [Prochlorperazine] Rash    Shellfish-Derived Products Swelling    Toradol [Ketorolac Tromethamine] Rash    Haldol [Haloperidol]      Shaking      Reglan [Metoclopramide] Itching     Fam HX:  family history includes Arthritis in her mother; Asthma in her father and son; Cancer in her father; Diabetes in her father and mother; Heart Disease in her mother; High Blood Pressure in her brother, father, and mother; High Cholesterol in her mother; Lupus in her daughter; Obesity in her mother; Other in her daughter; Vision Loss in her mother and son. Soc HX:   Social History     Socioeconomic History    Marital status:      Spouse name: None    Number of children: None    Years of education: None    Highest education level: None   Occupational History    None   Tobacco Use    Smoking status: Never Smoker    Smokeless tobacco: Never Used   Vaping Use    Vaping Use: Never used   Substance and Sexual Activity    Alcohol use: No     Alcohol/week: 0.0 standard drinks    Drug use: No    Sexual activity: Not Currently   Other Topics Concern    None   Social History Narrative    None     Social Determinants of Health     Financial Resource Strain: Low Risk     Difficulty of Paying Living Expenses: Not hard at all   Food Insecurity: No Food Insecurity    Worried About Running Out of Food in the Last Year: Never true    Tyrese of Food in the Last Year: Never true   Transportation Needs:     Lack of Transportation (Medical): Not on file    Lack of Transportation (Non-Medical):  Not on file   Physical Activity:     Days of Exercise per Week: Not on file    Minutes of Exercise per Session: Not on file   Stress:     Feeling of Stress : Not on file   Social Connections:     Frequency of Communication with Friends and Family: Not on file    Frequency of Social Gatherings with Friends and Family: Not on file    Attends Catholic Services: Not on file    Active Member of Clubs or Organizations: Not on file    Attends Club or Organization Meetings: Not on file    Marital Status: Not on file   Intimate Partner Violence:     Fear of Current or Ex-Partner: Not on file    Emotionally Abused: Not on file    Physically Abused: Not on file    Sexually Abused: Not on file   Housing Stability:     Unable to Pay for Housing in the Last Year: Not on file    Number of Places Lived in the Last Year: Not on file    Unstable Housing in the Last Year: Not on file       Medications:   Medications:    sodium chloride  1,000 mL IntraVENous Once      Infusions:   PRN Meds:      Labs      CBC:   Recent Labs     04/28/22  1714   WBC 4.0   HGB 10.9*        BMP:    Recent Labs     04/28/22 1714      K 3.9      CO2 24   BUN 15   CREATININE 0.8   GLUCOSE 89     Hepatic:   Recent Labs     04/28/22 1714   AST 14*   ALT 11   BILITOT 0.2   ALKPHOS 144*     Lipids:   Lab Results   Component Value Date    CHOL 146 02/10/2021    HDL 51 02/10/2021    TRIG 52 02/10/2021     Hemoglobin A1C:   Lab Results   Component Value Date    LABA1C 5.2 08/03/2021     TSH: No results found for: TSH  Troponin:   Lab Results   Component Value Date    TROPONINT <0.010 03/17/2022    TROPONINT <0.010 09/12/2021    TROPONINT <0.010 07/16/2021     Lactic Acid: No results for input(s): LACTA in the last 72 hours. BNP: No results for input(s): PROBNP in the last 72 hours.   UA:  Lab Results   Component Value Date    NITRU NEGATIVE 04/28/2022    COLORU YELLOW 04/28/2022    WBCUA 8 04/28/2022    RBCUA 1 04/28/2022    MUCUS OCCASIONAL 04/28/2022    TRICHOMONAS NONE SEEN 04/28/2022    YEAST RARE 07/28/2017    BACTERIA RARE 04/28/2022    CLARITYU CLEAR 04/28/2022    SPECGRAV 1.025 04/28/2022    LEUKOCYTESUR NEGATIVE 04/28/2022    UROBILINOGEN 0.2 04/28/2022    BILIRUBINUR NEGATIVE 04/28/2022    BLOODU NEGATIVE 04/28/2022    KETUA NEGATIVE 04/28/2022    AMORPHOUS RARE 07/10/2017     Urine Cultures: No results found for: Dorthula [place]  Blood Cultures: No results found for: BC  No results found for: BLOODCULT2  Organism:   Lab Results   Component Value Date    ORG PSAR 02/03/2016       Imaging/Diagnostics Last 24 Hours   CT HEAD WO CONTRAST    Result Date: 4/28/2022  EXAMINATION: CT OF THE HEAD WITHOUT CONTRAST; CT OF THE ABDOMEN AND PELVIS WITH CONTRAST 4/28/2022 7:27 pm TECHNIQUE: CT of the head was performed without the administration of intravenous contrast. Dose modulation, iterative reconstruction, and/or weight based adjustment of the mA/kV was utilized to reduce the radiation dose to as low as reasonably achievable.; CT of the abdomen and pelvis was performed with the administration of intravenous contrast. Multiplanar reformatted images are provided for review. Dose modulation, iterative reconstruction, and/or weight based adjustment of the mA/kV was utilized to reduce the radiation dose to as low as reasonably achievable. COMPARISON: 08/28/2021 HISTORY: ORDERING SYSTEM PROVIDED HISTORY: seizure fall head trauma TECHNOLOGIST PROVIDED HISTORY: Reason for exam:->seizure fall head trauma Has a \"code stroke\" or \"stroke alert\" been called? ->No Decision Support Exception - unselect if not a suspected or confirmed emergency medical condition->Emergency Medical Condition (MA) Is the patient pregnant?->No Reason for Exam: seizure fall head trauma FINDINGS: Head CT: BRAIN/VENTRICLES: There is no acute intracranial hemorrhage, mass effect or midline shift. No abnormal extra-axial fluid collection. The gray-white differentiation is maintained without evidence of an acute infarct. There is no evidence of hydrocephalus. ORBITS: The visualized portion of the orbits demonstrate no acute abnormality. SINUSES: The visualized paranasal sinuses and mastoid air cells demonstrate no acute abnormality.  SOFT TISSUES/SKULL:  No acute abnormality of the visualized skull or soft tissues. CT of the abdomen and pelvis : LOWER CHEST:  Visualized portion of the lower chest demonstrates no acute abnormality. Calcified granuloma within the left lower lobe. KIDNEYS AND URINARY TRACT: Multiple 2-4 mm nonobstructing stones the left kidney. .  There is no evidence for hydronephrosis. The ureters are of normal course and caliber. 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 No further follow-up is needed. Cherl Spinner ORGANS: Status post cholecystectomy with mild to moderate intra and extrahepatic biliary dilatation. Pneumobilia. .  Visualized portions of the liver, spleen, pancreas, gallbladder, and adrenal glands demonstrate no acute abnormality. GI/BOWEL: No bowel obstruction. No evidence of acute appendicitis. Status post gastric bypass PELVIS: The bladder and pelvic organs are unremarkable. PERITONEUM/RETROPERITONEUM: No free air or free fluid is noted. No pathologically enlarged lymphadenopathy. The vasculature do not demonstrate acute abnormality. BONES/SOFT TISSUES: The osseous structures demonstrate no acute abnormality. Head CT: No acute intracranial abnormality. No acute territorial infarction, intracranial hemorrhage or mass lesion. CT of the abdomen and pelvis: No acute pathology within the abdomen and pelvis. Multiple nonobstructing left kidney stones. Status post gastric bypass with mild to moderate intra and extrahepatic biliary dilatation and pneumobilia. 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 No further follow-up is needed.      CT ABDOMEN PELVIS W IV CONTRAST Additional Contrast? None    Result Date: 4/28/2022  EXAMINATION: CT OF THE HEAD WITHOUT CONTRAST; CT OF THE ABDOMEN AND PELVIS WITH CONTRAST 4/28/2022 7:27 pm TECHNIQUE: CT of the head was performed without the administration of intravenous contrast. Dose modulation, iterative reconstruction, and/or weight based adjustment of the mA/kV was utilized to reduce the radiation dose to as low as reasonably achievable.; CT of the abdomen and pelvis was performed with the administration of intravenous contrast. Multiplanar reformatted images are provided for review. Dose modulation, iterative reconstruction, and/or weight based adjustment of the mA/kV was utilized to reduce the radiation dose to as low as reasonably achievable. COMPARISON: 08/28/2021 HISTORY: ORDERING SYSTEM PROVIDED HISTORY: seizure fall head trauma TECHNOLOGIST PROVIDED HISTORY: Reason for exam:->seizure fall head trauma Has a \"code stroke\" or \"stroke alert\" been called? ->No Decision Support Exception - unselect if not a suspected or confirmed emergency medical condition->Emergency Medical Condition (MA) Is the patient pregnant?->No Reason for Exam: seizure fall head trauma FINDINGS: Head CT: BRAIN/VENTRICLES: There is no acute intracranial hemorrhage, mass effect or midline shift. No abnormal extra-axial fluid collection. The gray-white differentiation is maintained without evidence of an acute infarct. There is no evidence of hydrocephalus. ORBITS: The visualized portion of the orbits demonstrate no acute abnormality. SINUSES: The visualized paranasal sinuses and mastoid air cells demonstrate no acute abnormality. SOFT TISSUES/SKULL:  No acute abnormality of the visualized skull or soft tissues. CT of the abdomen and pelvis : LOWER CHEST:  Visualized portion of the lower chest demonstrates no acute abnormality. Calcified granuloma within the left lower lobe. KIDNEYS AND URINARY TRACT: Multiple 2-4 mm nonobstructing stones the left kidney. .  There is no evidence for hydronephrosis. The ureters are of normal course and caliber. 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 No further follow-up is needed. [place] ORGANS: Status post cholecystectomy with mild to moderate intra and extrahepatic biliary dilatation. Pneumobilia. .  Visualized portions of the liver, spleen, pancreas, gallbladder, and adrenal glands demonstrate no acute abnormality. GI/BOWEL: No bowel obstruction. No evidence of acute appendicitis. Status post gastric bypass PELVIS: The bladder and pelvic organs are unremarkable. PERITONEUM/RETROPERITONEUM: No free air or free fluid is noted. No pathologically enlarged lymphadenopathy. The vasculature do not demonstrate acute abnormality. BONES/SOFT TISSUES: The osseous structures demonstrate no acute abnormality. Head CT: No acute intracranial abnormality. No acute territorial infarction, intracranial hemorrhage or mass lesion. CT of the abdomen and pelvis: No acute pathology within the abdomen and pelvis. Multiple nonobstructing left kidney stones. Status post gastric bypass with mild to moderate intra and extrahepatic biliary dilatation and pneumobilia. 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 No further follow-up is needed.            Electronically signed by Veda Vides MD on 4/28/2022 at 9:28 PM

## 2022-04-30 LAB
ANION GAP SERPL CALCULATED.3IONS-SCNC: 12 MMOL/L (ref 4–16)
BASOPHILS ABSOLUTE: 0 K/CU MM
BASOPHILS RELATIVE PERCENT: 0.3 % (ref 0–1)
BUN BLDV-MCNC: 10 MG/DL (ref 6–23)
CALCIUM SERPL-MCNC: 9 MG/DL (ref 8.3–10.6)
CHLORIDE BLD-SCNC: 110 MMOL/L (ref 99–110)
CO2: 20 MMOL/L (ref 21–32)
CREAT SERPL-MCNC: 0.7 MG/DL (ref 0.6–1.1)
DIFFERENTIAL TYPE: ABNORMAL
EOSINOPHILS ABSOLUTE: 0.2 K/CU MM
EOSINOPHILS RELATIVE PERCENT: 6.6 % (ref 0–3)
GFR AFRICAN AMERICAN: >60 ML/MIN/1.73M2
GFR NON-AFRICAN AMERICAN: >60 ML/MIN/1.73M2
GLUCOSE BLD-MCNC: 130 MG/DL (ref 70–99)
HCT VFR BLD CALC: 33.2 % (ref 37–47)
HEMOGLOBIN: 9.9 GM/DL (ref 12.5–16)
IMMATURE NEUTROPHIL %: 0 % (ref 0–0.43)
KEPPRA: 10 UG/ML (ref 10–40)
LYMPHOCYTES ABSOLUTE: 1.1 K/CU MM
LYMPHOCYTES RELATIVE PERCENT: 38.9 % (ref 24–44)
MCH RBC QN AUTO: 27 PG (ref 27–31)
MCHC RBC AUTO-ENTMCNC: 29.8 % (ref 32–36)
MCV RBC AUTO: 90.7 FL (ref 78–100)
MONOCYTES ABSOLUTE: 0.2 K/CU MM
MONOCYTES RELATIVE PERCENT: 6.9 % (ref 0–4)
NUCLEATED RBC %: 0 %
PDW BLD-RTO: 13.2 % (ref 11.7–14.9)
PLATELET # BLD: 155 K/CU MM (ref 140–440)
PMV BLD AUTO: 10.4 FL (ref 7.5–11.1)
POTASSIUM SERPL-SCNC: 3.7 MMOL/L (ref 3.5–5.1)
RBC # BLD: 3.66 M/CU MM (ref 4.2–5.4)
SEGMENTED NEUTROPHILS ABSOLUTE COUNT: 1.4 K/CU MM
SEGMENTED NEUTROPHILS RELATIVE PERCENT: 47.3 % (ref 36–66)
SODIUM BLD-SCNC: 142 MMOL/L (ref 135–145)
TOTAL IMMATURE NEUTOROPHIL: 0 K/CU MM
TOTAL NUCLEATED RBC: 0 K/CU MM
WBC # BLD: 2.9 K/CU MM (ref 4–10.5)

## 2022-04-30 PROCEDURE — 85652 RBC SED RATE AUTOMATED: CPT

## 2022-04-30 PROCEDURE — 6360000002 HC RX W HCPCS: Performed by: NURSE PRACTITIONER

## 2022-04-30 PROCEDURE — 36415 COLL VENOUS BLD VENIPUNCTURE: CPT

## 2022-04-30 PROCEDURE — 96372 THER/PROPH/DIAG INJ SC/IM: CPT

## 2022-04-30 PROCEDURE — 86038 ANTINUCLEAR ANTIBODIES: CPT

## 2022-04-30 PROCEDURE — 6360000002 HC RX W HCPCS: Performed by: INTERNAL MEDICINE

## 2022-04-30 PROCEDURE — 80048 BASIC METABOLIC PNL TOTAL CA: CPT

## 2022-04-30 PROCEDURE — 6370000000 HC RX 637 (ALT 250 FOR IP): Performed by: STUDENT IN AN ORGANIZED HEALTH CARE EDUCATION/TRAINING PROGRAM

## 2022-04-30 PROCEDURE — 2580000003 HC RX 258: Performed by: INTERNAL MEDICINE

## 2022-04-30 PROCEDURE — 85025 COMPLETE CBC W/AUTO DIFF WBC: CPT

## 2022-04-30 PROCEDURE — A4216 STERILE WATER/SALINE, 10 ML: HCPCS | Performed by: INTERNAL MEDICINE

## 2022-04-30 PROCEDURE — 6360000002 HC RX W HCPCS: Performed by: STUDENT IN AN ORGANIZED HEALTH CARE EDUCATION/TRAINING PROGRAM

## 2022-04-30 PROCEDURE — 36591 DRAW BLOOD OFF VENOUS DEVICE: CPT

## 2022-04-30 PROCEDURE — 96376 TX/PRO/DX INJ SAME DRUG ADON: CPT

## 2022-04-30 PROCEDURE — G0378 HOSPITAL OBSERVATION PER HR: HCPCS

## 2022-04-30 PROCEDURE — 2580000003 HC RX 258: Performed by: STUDENT IN AN ORGANIZED HEALTH CARE EDUCATION/TRAINING PROGRAM

## 2022-04-30 PROCEDURE — 6370000000 HC RX 637 (ALT 250 FOR IP): Performed by: INTERNAL MEDICINE

## 2022-04-30 PROCEDURE — 94761 N-INVAS EAR/PLS OXIMETRY MLT: CPT

## 2022-04-30 PROCEDURE — 1200000000 HC SEMI PRIVATE

## 2022-04-30 RX ORDER — MORPHINE SULFATE 2 MG/ML
2 INJECTION, SOLUTION INTRAMUSCULAR; INTRAVENOUS EVERY 8 HOURS PRN
Status: DISCONTINUED | OUTPATIENT
Start: 2022-04-30 | End: 2022-05-01

## 2022-04-30 RX ORDER — SODIUM CHLORIDE 9 MG/ML
INJECTION, SOLUTION INTRAVENOUS CONTINUOUS
Status: DISCONTINUED | OUTPATIENT
Start: 2022-04-30 | End: 2022-05-01

## 2022-04-30 RX ORDER — DIAZEPAM 2 MG/1
2 TABLET ORAL EVERY 12 HOURS
Status: DISCONTINUED | OUTPATIENT
Start: 2022-04-30 | End: 2022-05-02 | Stop reason: HOSPADM

## 2022-04-30 RX ADMIN — LORAZEPAM 1 MG: 2 INJECTION INTRAMUSCULAR; INTRAVENOUS at 14:13

## 2022-04-30 RX ADMIN — GABAPENTIN 800 MG: 400 CAPSULE ORAL at 14:06

## 2022-04-30 RX ADMIN — ESTRADIOL 0.5 MG: 1 TABLET ORAL at 08:14

## 2022-04-30 RX ADMIN — MORPHINE SULFATE 2 MG: 2 INJECTION, SOLUTION INTRAMUSCULAR; INTRAVENOUS at 22:35

## 2022-04-30 RX ADMIN — LEVETIRACETAM 1000 MG: 500 TABLET, FILM COATED ORAL at 08:16

## 2022-04-30 RX ADMIN — OXYCODONE AND ACETAMINOPHEN 1 TABLET: 5; 325 TABLET ORAL at 10:24

## 2022-04-30 RX ADMIN — PROMETHAZINE HYDROCHLORIDE 12.5 MG: 25 INJECTION INTRAMUSCULAR; INTRAVENOUS at 14:05

## 2022-04-30 RX ADMIN — ONDANSETRON 4 MG: 2 INJECTION INTRAMUSCULAR; INTRAVENOUS at 18:19

## 2022-04-30 RX ADMIN — HYDROMORPHONE HYDROCHLORIDE 0.5 MG: 1 INJECTION, SOLUTION INTRAMUSCULAR; INTRAVENOUS; SUBCUTANEOUS at 06:23

## 2022-04-30 RX ADMIN — LEVETIRACETAM 1000 MG: 500 TABLET, FILM COATED ORAL at 22:34

## 2022-04-30 RX ADMIN — MORPHINE SULFATE 2 MG: 2 INJECTION, SOLUTION INTRAMUSCULAR; INTRAVENOUS at 09:32

## 2022-04-30 RX ADMIN — SODIUM CHLORIDE, PRESERVATIVE FREE 10 ML: 5 INJECTION INTRAVENOUS at 22:36

## 2022-04-30 RX ADMIN — GABAPENTIN 800 MG: 400 CAPSULE ORAL at 08:16

## 2022-04-30 RX ADMIN — FERROUS SULFATE TAB 325 MG (65 MG ELEMENTAL FE) 325 MG: 325 (65 FE) TAB at 08:14

## 2022-04-30 RX ADMIN — HYDROXYZINE PAMOATE 50 MG: 25 CAPSULE ORAL at 22:33

## 2022-04-30 RX ADMIN — OXYCODONE AND ACETAMINOPHEN 1 TABLET: 5; 325 TABLET ORAL at 18:19

## 2022-04-30 RX ADMIN — SODIUM CHLORIDE: 9 INJECTION, SOLUTION INTRAVENOUS at 18:30

## 2022-04-30 RX ADMIN — HYDROXYZINE PAMOATE 50 MG: 25 CAPSULE ORAL at 08:16

## 2022-04-30 RX ADMIN — DIAZEPAM 2 MG: 2 TABLET ORAL at 22:34

## 2022-04-30 RX ADMIN — PANTOPRAZOLE SODIUM 40 MG: 40 TABLET, DELAYED RELEASE ORAL at 08:17

## 2022-04-30 RX ADMIN — LEVOTHYROXINE SODIUM 125 MCG: 0.12 TABLET ORAL at 08:17

## 2022-04-30 RX ADMIN — ONDANSETRON 4 MG: 2 INJECTION INTRAMUSCULAR; INTRAVENOUS at 06:21

## 2022-04-30 RX ADMIN — GABAPENTIN 800 MG: 400 CAPSULE ORAL at 22:34

## 2022-04-30 RX ADMIN — DIAZEPAM 2 MG: 2 TABLET ORAL at 10:20

## 2022-04-30 RX ADMIN — ENOXAPARIN SODIUM 30 MG: 100 INJECTION SUBCUTANEOUS at 08:20

## 2022-04-30 RX ADMIN — ENOXAPARIN SODIUM 30 MG: 100 INJECTION SUBCUTANEOUS at 22:34

## 2022-04-30 RX ADMIN — TIZANIDINE 4 MG: 4 TABLET ORAL at 10:24

## 2022-04-30 RX ADMIN — PAROXETINE 45 MG: 10 TABLET, FILM COATED ORAL at 22:32

## 2022-04-30 RX ADMIN — SODIUM CHLORIDE: 9 INJECTION, SOLUTION INTRAVENOUS at 07:54

## 2022-04-30 ASSESSMENT — PAIN DESCRIPTION - FREQUENCY
FREQUENCY: CONTINUOUS

## 2022-04-30 ASSESSMENT — PAIN DESCRIPTION - DESCRIPTORS
DESCRIPTORS: ACHING;DISCOMFORT
DESCRIPTORS: ACHING;CRAMPING;SHARP

## 2022-04-30 ASSESSMENT — PAIN SCALES - WONG BAKER
WONGBAKER_NUMERICALRESPONSE: 2

## 2022-04-30 ASSESSMENT — PAIN DESCRIPTION - LOCATION
LOCATION: ABDOMEN
LOCATION: ABDOMEN;FLANK

## 2022-04-30 ASSESSMENT — PAIN - FUNCTIONAL ASSESSMENT
PAIN_FUNCTIONAL_ASSESSMENT: ACTIVITIES ARE NOT PREVENTED

## 2022-04-30 ASSESSMENT — PAIN DESCRIPTION - PAIN TYPE
TYPE: ACUTE PAIN

## 2022-04-30 ASSESSMENT — PAIN DESCRIPTION - ONSET
ONSET: ON-GOING

## 2022-04-30 ASSESSMENT — PAIN DESCRIPTION - ORIENTATION
ORIENTATION: OTHER (COMMENT)
ORIENTATION: RIGHT
ORIENTATION: RIGHT

## 2022-04-30 ASSESSMENT — PAIN SCALES - GENERAL
PAINLEVEL_OUTOF10: 8
PAINLEVEL_OUTOF10: 8
PAINLEVEL_OUTOF10: 2
PAINLEVEL_OUTOF10: 2
PAINLEVEL_OUTOF10: 7
PAINLEVEL_OUTOF10: 8
PAINLEVEL_OUTOF10: 8
PAINLEVEL_OUTOF10: 5
PAINLEVEL_OUTOF10: 2

## 2022-04-30 NOTE — PROGRESS NOTES
V2.0  [facility] – [place], [place] Hospitalist Progress Note      Name:  Danisha Recinos /Age/Sex: 1970  (46 y.o. female)   MRN & CSN:  [id number] & [id number] Encounter Date/Time: 2022 8:58 PM EDT    Location:  [address] [place] [address] PCP: Swapna Marx MD, MD       Hospital Day: 3    Assessment and Plan:   Danisha Recinos is a 46 y.o. female with pmh of seizure disorder, pseudoseizures, hypertension, hypothyroidism, history of sleeve gastrectomy, GERD, chronic pain, recent cyst removal left foot who presents with Diarrhea    Plan:  Possible gastroenteritis: Patient has chronic complaints of nausea, vomiting and diarrhea. She was recently discharged from [facility] for similar complaints. CT abdomen shows no acute pathology. Continue antiemetics, IV fluids. Electrolytes are within normal limits. Reassured her that her labs and imaging do not show any abnormalities and discussed discharge today. She states that she still has generalized body pain but thinks she can go home tomorrow. Breakthrough seizure versus pseudoseizure: The patient has a history of pseudoseizures. She was recently admitted to [facility] for overnight monitoring for seizures and nausea control. Did not have any true seizure activity during that admission and was requesting pain medications stating that her chronic pain triggers seizures. Continue Keppra. Seizure and aspiration precautions. Noncontrast head CT shows no acute intracranial abnormality. Today she is shaking in bed as though she has chills but she is referring to these as seizures. She is wide-awake during these episodes. She is asking for \"a shot of pain meds\" to help with the pain which is \"all over\" her body.      Hypothyroidism: Continue levothyroxine  History of sleeve gastrectomy: Continue multivitamin  Hypertension: Antihypertensive medications have been held as blood pressures are relatively low  Chronic pain: She will continue follow-up with pain management on discharge  Continue home medications for chronic medical conditions unless contraindicated  DVT prophylaxis: Enoxaparin  Disposition Home on 5/1    Diet ADULT DIET; Regular   DVT Prophylaxis [] Lovenox, []  Heparin, [] SCDs, [] Ambulation,  [] Eliquis, [] Xarelto  [] Coumadin   Code Status Full Code   Disposition From: Home  Expected Disposition: Home  Estimated Date of Discharge: 5/12022  Patient requires continued admission due to gastroenteritis, breakthrough seizure versus pseudoseizure   Surrogate Decision Maker/ POA -     Subjective:     Chief Complaint: Abdominal Pain, Seizures (0800 today, takes Keppra), and Emesis     Patti Mack is a 46 y.o. female who presents with nausea, vomiting, diarrhea, possible breakthrough seizure. Today she is complaining of generalized body pain. She is requesting Dilaudid. She states that she is unable to tolerate p.o. meds right now due to nausea. Review of Systems:    Review of Systems    10 point review of systems is negative other than the above    Objective:   No intake or output data in the 24 hours ending 04/30/22 1918     Vitals:   Vitals:    04/30/22 0758   BP: 117/77   Pulse: 65   Resp: 18   Temp: 97.5 °F (36.4 °C)   SpO2: 98%       Physical Exam:     General: NAD  Eyes: EOMI  ENT: neck supple  Cardiovascular: Regular rate and rhythm, no murmurs gallops or rubs. Respiratory: Clear to auscultation  Gastrointestinal: Soft, non tender, no distention, bowel sounds are present, no guarding or rebound tenderness  Genitourinary: no suprapubic tenderness  Musculoskeletal: No edema  Skin: warm, dry  Neuro: Alert. Psych: Mood appropriate.      Medications:   Medications:    diazePAM  2 mg Oral Q12H    [Held by provider] atenolol  25 mg Oral Daily    [Held by provider] cloNIDine  0.1 mg Oral BID    estradiol  0.5 mg Oral Daily    gabapentin  800 mg Oral TID    hydrOXYzine  50 mg Oral BID    levETIRAcetam  1,000 mg Oral BID    levothyroxine  125 mcg Oral Daily    pantoprazole 40 mg Oral BID    PARoxetine  45 mg Oral Nightly    sodium chloride flush  10 mL IntraVENous 2 times per day    enoxaparin  30 mg SubCUTAneous BID    [Held by provider] CENTRUM/CERTA-LILLY with minerals oral  15 mL Oral Daily      Infusions:    sodium chloride 75 mL/hr at 04/30/22 1830    sodium chloride       PRN Meds: promethazine, 12.5 mg, Q6H PRN  albuterol, 2.5 mg, Q6H PRN  meclizine, 25 mg, TID PRN  melatonin, 10 mg, Nightly PRN  oxyCODONE-acetaminophen, 1 tablet, Q8H PRN  tiZANidine, 4 mg, Q8H PRN  sodium chloride flush, 10 mL, PRN  sodium chloride, , PRN  ondansetron, 4 mg, Q8H PRN   Or  ondansetron, 4 mg, Q6H PRN  bisacodyl, 5 mg, Daily PRN  acetaminophen, 650 mg, Q6H PRN   Or  acetaminophen, 650 mg, Q6H PRN  LORazepam, 1 mg, Q5 Min PRN        Labs      Recent Results (from the past 24 hour(s))   CBC with Auto Differential    Collection Time: 04/29/22  8:59 PM   Result Value Ref Range    WBC 3.8 (L) 4.0 - 10.5 K/CU MM    RBC 3.87 (L) 4.2 - 5.4 M/CU MM    Hemoglobin 10.4 (L) 12.5 - 16.0 GM/DL    Hematocrit 33.9 (L) 37 - 47 %    MCV 87.6 78 - 100 FL    MCH 26.9 (L) 27 - 31 PG    MCHC 30.7 (L) 32.0 - 36.0 %    RDW 13.2 11.7 - 14.9 %    Platelets 216 909 - 995 K/CU MM    MPV 10.4 7.5 - 11.1 FL    Differential Type AUTOMATED DIFFERENTIAL     Segs Relative 46.4 36 - 66 %    Lymphocytes % 38.5 24 - 44 %    Monocytes % 8.2 (H) 0 - 4 %    Eosinophils % 5.8 (H) 0 - 3 %    Basophils % 1.1 (H) 0 - 1 %    Segs Absolute 1.8 K/CU MM    Lymphocytes Absolute 1.5 K/CU MM    Monocytes Absolute 0.3 K/CU MM    Eosinophils Absolute 0.2 K/CU MM    Basophils Absolute 0.0 K/CU MM    Nucleated RBC % 0.0 %    Total Nucleated RBC 0.0 K/CU MM    Total Immature Neutrophil 0.00 K/CU MM    Immature Neutrophil % 0.0 0 - 0.43 %   Basic Metabolic Panel w/ Reflex to MG    Collection Time: 04/30/22  7:39 AM   Result Value Ref Range    Sodium 142 135 - 145 MMOL/L    Potassium 3.7 3.5 - 5.1 MMOL/L    Chloride 110 99 - 110 mMol/L    CO2 20 (L) 21 - 32 MMOL/L    Anion Gap 12 4 - 16    BUN 10 6 - 23 MG/DL    CREATININE 0.7 0.6 - 1.1 MG/DL    Glucose 130 (H) 70 - 99 MG/DL    Calcium 9.0 8.3 - 10.6 MG/DL    GFR Non-African American >60 >60 mL/min/1.73m2    GFR African American >60 >60 mL/min/1.73m2   CBC auto differential    Collection Time: 04/30/22  7:39 AM   Result Value Ref Range    WBC 2.9 (L) 4.0 - 10.5 K/CU MM    RBC 3.66 (L) 4.2 - 5.4 M/CU MM    Hemoglobin 9.9 (L) 12.5 - 16.0 GM/DL    Hematocrit 33.2 (L) 37 - 47 %    MCV 90.7 78 - 100 FL    MCH 27.0 27 - 31 PG    MCHC 29.8 (L) 32.0 - 36.0 %    RDW 13.2 11.7 - 14.9 %    Platelets 453 879 - 768 K/CU MM    MPV 10.4 7.5 - 11.1 FL    Differential Type AUTOMATED DIFFERENTIAL     Segs Relative 47.3 36 - 66 %    Lymphocytes % 38.9 24 - 44 %    Monocytes % 6.9 (H) 0 - 4 %    Eosinophils % 6.6 (H) 0 - 3 %    Basophils % 0.3 0 - 1 %    Segs Absolute 1.4 K/CU MM    Lymphocytes Absolute 1.1 K/CU MM    Monocytes Absolute 0.2 K/CU MM    Eosinophils Absolute 0.2 K/CU MM    Basophils Absolute 0.0 K/CU MM    Nucleated RBC % 0.0 %    Total Nucleated RBC 0.0 K/CU MM    Total Immature Neutrophil 0.00 K/CU MM    Immature Neutrophil % 0.0 0 - 0.43 %        Imaging/Diagnostics Last 24 Hours   CT HEAD WO CONTRAST    Result Date: 4/28/2022  EXAMINATION: CT OF THE HEAD WITHOUT CONTRAST; CT OF THE ABDOMEN AND PELVIS WITH CONTRAST 4/28/2022 7:27 pm TECHNIQUE: CT of the head was performed without the administration of intravenous contrast. Dose modulation, iterative reconstruction, and/or weight based adjustment of the mA/kV was utilized to reduce the radiation dose to as low as reasonably achievable.; CT of the abdomen and pelvis was performed with the administration of intravenous contrast. Multiplanar reformatted images are provided for review. Dose modulation, iterative reconstruction, and/or weight based adjustment of the mA/kV was utilized to reduce the radiation dose to as low as reasonably achievable.  COMPARISON: 08/28/2021 HISTORY: ORDERING SYSTEM PROVIDED HISTORY: seizure fall head trauma TECHNOLOGIST PROVIDED HISTORY: Reason for exam:->seizure fall head trauma Has a \"code stroke\" or \"stroke alert\" been called? ->No Decision Support Exception - unselect if not a suspected or confirmed emergency medical condition->Emergency Medical Condition (MA) Is the patient pregnant?->No Reason for Exam: seizure fall head trauma FINDINGS: Head CT: BRAIN/VENTRICLES: There is no acute intracranial hemorrhage, mass effect or midline shift. No abnormal extra-axial fluid collection. The gray-white differentiation is maintained without evidence of an acute infarct. There is no evidence of hydrocephalus. ORBITS: The visualized portion of the orbits demonstrate no acute abnormality. SINUSES: The visualized paranasal sinuses and mastoid air cells demonstrate no acute abnormality. SOFT TISSUES/SKULL:  No acute abnormality of the visualized skull or soft tissues. CT of the abdomen and pelvis : LOWER CHEST:  Visualized portion of the lower chest demonstrates no acute abnormality. Calcified granuloma within the left lower lobe. KIDNEYS AND URINARY TRACT: Multiple 2-4 mm nonobstructing stones the left kidney. .  There is no evidence for hydronephrosis. The ureters are of normal course and caliber. 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 No further follow-up is needed. Lucia Dye ORGANS: Status post cholecystectomy with mild to moderate intra and extrahepatic biliary dilatation. Pneumobilia. .  Visualized portions of the liver, spleen, pancreas, gallbladder, and adrenal glands demonstrate no acute abnormality. GI/BOWEL: No bowel obstruction. No evidence of acute appendicitis. Status post gastric bypass PELVIS: The bladder and pelvic organs are unremarkable. PERITONEUM/RETROPERITONEUM: No free air or free fluid is noted. No pathologically enlarged lymphadenopathy. The vasculature do not demonstrate acute abnormality. BONES/SOFT TISSUES: The osseous structures demonstrate no acute abnormality. Head CT: No acute intracranial abnormality. No acute territorial infarction, intracranial hemorrhage or mass lesion. CT of the abdomen and pelvis: No acute pathology within the abdomen and pelvis. Multiple nonobstructing left kidney stones. Status post gastric bypass with mild to moderate intra and extrahepatic biliary dilatation and pneumobilia. 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 No further follow-up is needed. CT ABDOMEN PELVIS W IV CONTRAST Additional Contrast? None    Result Date: 4/28/2022  EXAMINATION: CT OF THE HEAD WITHOUT CONTRAST; CT OF THE ABDOMEN AND PELVIS WITH CONTRAST 4/28/2022 7:27 pm TECHNIQUE: CT of the head was performed without the administration of intravenous contrast. Dose modulation, iterative reconstruction, and/or weight based adjustment of the mA/kV was utilized to reduce the radiation dose to as low as reasonably achievable.; CT of the abdomen and pelvis was performed with the administration of intravenous contrast. Multiplanar reformatted images are provided for review. Dose modulation, iterative reconstruction, and/or weight based adjustment of the mA/kV was utilized to reduce the radiation dose to as low as reasonably achievable. COMPARISON: 08/28/2021 HISTORY: ORDERING SYSTEM PROVIDED HISTORY: seizure fall head trauma TECHNOLOGIST PROVIDED HISTORY: Reason for exam:->seizure fall head trauma Has a \"code stroke\" or \"stroke alert\" been called? ->No Decision Support Exception - unselect if not a suspected or confirmed emergency medical condition->Emergency Medical Condition (MA) Is the patient pregnant?->No Reason for Exam: seizure fall head trauma FINDINGS: Head CT: BRAIN/VENTRICLES: There is no acute intracranial hemorrhage, mass effect or midline shift. No abnormal extra-axial fluid collection.   The gray-white differentiation is maintained without evidence of an acute infarct. There is no evidence of hydrocephalus. ORBITS: The visualized portion of the orbits demonstrate no acute abnormality. SINUSES: The visualized paranasal sinuses and mastoid air cells demonstrate no acute abnormality. SOFT TISSUES/SKULL:  No acute abnormality of the visualized skull or soft tissues. CT of the abdomen and pelvis : LOWER CHEST:  Visualized portion of the lower chest demonstrates no acute abnormality. Calcified granuloma within the left lower lobe. KIDNEYS AND URINARY TRACT: Multiple 2-4 mm nonobstructing stones the left kidney. .  There is no evidence for hydronephrosis. The ureters are of normal course and caliber. 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 No further follow-up is needed. Ernie Irma ORGANS: Status post cholecystectomy with mild to moderate intra and extrahepatic biliary dilatation. Pneumobilia. .  Visualized portions of the liver, spleen, pancreas, gallbladder, and adrenal glands demonstrate no acute abnormality. GI/BOWEL: No bowel obstruction. No evidence of acute appendicitis. Status post gastric bypass PELVIS: The bladder and pelvic organs are unremarkable. PERITONEUM/RETROPERITONEUM: No free air or free fluid is noted. No pathologically enlarged lymphadenopathy. The vasculature do not demonstrate acute abnormality. BONES/SOFT TISSUES: The osseous structures demonstrate no acute abnormality. Head CT: No acute intracranial abnormality. No acute territorial infarction, intracranial hemorrhage or mass lesion. CT of the abdomen and pelvis: No acute pathology within the abdomen and pelvis. Multiple nonobstructing left kidney stones. Status post gastric bypass with mild to moderate intra and extrahepatic biliary dilatation and pneumobilia. 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 No further follow-up is needed.        Electronically signed by Shane Hopkins MD on 4/30/2022 at 7:18 PM

## 2022-04-30 NOTE — PROGRESS NOTES
V2.0  [facility] [place] [facility] Progress Note      Name:  Sj Qureshi /Age/Sex: 1970  (46 y.o. female)   MRN & CSN:  [id number] & [id number] Encounter Date/Time: 2022 8:58 PM EDT    Location:  [address] [place] [address] PCP: Nestor Regan MD, MD       Hospital Day: 2    Assessment and Plan:   Sj Qureshi is a 46 y.o. female with pmh of seizure disorder, pseudoseizures, hypertension, hypothyroidism, history of sleeve gastrectomy, GERD, chronic pain, recent cyst removal left foot who presents with Diarrhea    Plan:  Possible gastroenteritis: Patient has chronic complaints of nausea and vomiting. She was recently discharged from [facility] for similar complaints. CT abdomen shows no acute pathology. Continue antiemetics, IV fluids. Correct electrolytes as needed. Breakthrough seizure versus pseudoseizure: The patient has a history of pseudoseizures. She was recently admitted to [facility] for overnight monitoring for seizures and nausea control. Did not have any true seizure activity during that admission and was requesting pain medications stating that her chronic pain triggers seizures. Neurology is following. Continue Keppra. Seizure and aspiration precautions. Noncontrast head CT shows no acute intracranial abnormality. Hypothyroidism: Continue levothyroxine  History of sleeve gastrectomy: Continue multivitamin  Hypertension: Antihypertensive medications have been held as blood pressures are relatively low  Chronic pain: She will continue follow-up with pain management on discharge  Continue home medications for chronic medical conditions unless contraindicated  DVT prophylaxis: Enoxaparin  Disposition Home    Diet ADULT DIET;  Regular   DVT Prophylaxis [] Lovenox, []  Heparin, [] SCDs, [] Ambulation,  [] Eliquis, [] Xarelto  [] Coumadin   Code Status Full Code   Disposition From: Home  Expected Disposition: Home  Estimated Date of Discharge: 2022  Patient requires continued admission due to gastroenteritis, breakthrough seizure versus pseudoseizure   Surrogate Decision Maker/ POA -     Subjective:     Chief Complaint: Abdominal Pain, Seizures (0800 today, takes Keppra), and Emesis     Mague Lowery is a 46 y.o. female who presents with nausea, vomiting, diarrhea, possible breakthrough seizure. Today she is complaining of pain in her abdomen and also has generalized body pain. She is requesting Dilaudid. She states that the IV morphine is not helping. She states that she is unable to tolerate p.o. meds right now due to nausea. Review of Systems:    Review of Systems    10 point review of systems is negative other than the above    Objective:   No intake or output data in the 24 hours ending 04/29/22 2058     Vitals:   Vitals:    04/29/22 1409   BP:    Pulse: 64   Resp: 17   Temp: 99 °F (37.2 °C)   SpO2: 96%       Physical Exam:     General: NAD  Eyes: EOMI  ENT: neck supple  Cardiovascular: Regular rate and rhythm, no murmurs gallops or rubs. Respiratory: Clear to auscultation  Gastrointestinal: Soft, non tender, no distention, bowel sounds are present, no guarding or rebound tenderness  Genitourinary: no suprapubic tenderness  Musculoskeletal: No edema  Skin: warm, dry  Neuro: Alert. Psych: Mood appropriate.      Medications:   Medications:    [Held by provider] atenolol  25 mg Oral Daily    [Held by provider] cloNIDine  0.1 mg Oral BID    estradiol  0.5 mg Oral Daily    ferrous sulfate  325 mg Oral Daily with breakfast    gabapentin  800 mg Oral TID    hydrOXYzine  50 mg Oral BID    levETIRAcetam  1,000 mg Oral BID    levothyroxine  125 mcg Oral Daily    pantoprazole  40 mg Oral BID    PARoxetine  45 mg Oral Nightly    sodium chloride flush  10 mL IntraVENous 2 times per day    enoxaparin  30 mg SubCUTAneous BID    CENTRUM/CERTA-LILLY with minerals oral  15 mL Oral Daily      Infusions:    sodium chloride      sodium chloride 100 mL/hr at 04/29/22 0824     PRN Meds: promethazine, 12.5 mg, Q6H PRN  HYDROmorphone, 0.5 mg, Q4H PRN  albuterol, 2.5 mg, Q6H PRN  meclizine, 25 mg, TID PRN  melatonin, 10 mg, Nightly PRN  oxyCODONE-acetaminophen, 1 tablet, Q8H PRN  tiZANidine, 4 mg, Q8H PRN  sodium chloride flush, 10 mL, PRN  sodium chloride, , PRN  ondansetron, 4 mg, Q8H PRN   Or  ondansetron, 4 mg, Q6H PRN  bisacodyl, 5 mg, Daily PRN  acetaminophen, 650 mg, Q6H PRN   Or  acetaminophen, 650 mg, Q6H PRN  LORazepam, 1 mg, Q5 Min PRN  LORazepam, 4 mg, PRN  morphine, 2 mg, Q4H PRN        Labs      Recent Results (from the past 24 hour(s))   Basic Metabolic Panel w/ Reflex to MG    Collection Time: 04/29/22  3:54 PM   Result Value Ref Range    Sodium 139 135 - 145 MMOL/L    Potassium 4.7 3.5 - 5.1 MMOL/L    Chloride 110 99 - 110 mMol/L    CO2 20 (L) 21 - 32 MMOL/L    Anion Gap 9 4 - 16    BUN 12 6 - 23 MG/DL    CREATININE 0.9 0.6 - 1.1 MG/DL    Glucose 82 70 - 99 MG/DL    Calcium 9.4 8.3 - 10.6 MG/DL    GFR Non-African American >60 >60 mL/min/1.73m2    GFR African American >60 >60 mL/min/1.73m2        Imaging/Diagnostics Last 24 Hours   CT HEAD WO CONTRAST    Result Date: 4/28/2022  EXAMINATION: CT OF THE HEAD WITHOUT CONTRAST; CT OF THE ABDOMEN AND PELVIS WITH CONTRAST 4/28/2022 7:27 pm TECHNIQUE: CT of the head was performed without the administration of intravenous contrast. Dose modulation, iterative reconstruction, and/or weight based adjustment of the mA/kV was utilized to reduce the radiation dose to as low as reasonably achievable.; CT of the abdomen and pelvis was performed with the administration of intravenous contrast. Multiplanar reformatted images are provided for review. Dose modulation, iterative reconstruction, and/or weight based adjustment of the mA/kV was utilized to reduce the radiation dose to as low as reasonably achievable.  COMPARISON: 08/28/2021 HISTORY: ORDERING SYSTEM PROVIDED HISTORY: seizure fall head trauma TECHNOLOGIST PROVIDED HISTORY: Reason for exam:->seizure fall head trauma Has a \"code stroke\" or \"stroke alert\" been called? ->No Decision Support Exception - unselect if not a suspected or confirmed emergency medical condition->Emergency Medical Condition (MA) Is the patient pregnant?->No Reason for Exam: seizure fall head trauma FINDINGS: Head CT: BRAIN/VENTRICLES: There is no acute intracranial hemorrhage, mass effect or midline shift. No abnormal extra-axial fluid collection. The gray-white differentiation is maintained without evidence of an acute infarct. There is no evidence of hydrocephalus. ORBITS: The visualized portion of the orbits demonstrate no acute abnormality. SINUSES: The visualized paranasal sinuses and mastoid air cells demonstrate no acute abnormality. SOFT TISSUES/SKULL:  No acute abnormality of the visualized skull or soft tissues. CT of the abdomen and pelvis : LOWER CHEST:  Visualized portion of the lower chest demonstrates no acute abnormality. Calcified granuloma within the left lower lobe. KIDNEYS AND URINARY TRACT: Multiple 2-4 mm nonobstructing stones the left kidney. .  There is no evidence for hydronephrosis. The ureters are of normal course and caliber. 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 No further follow-up is needed. Dulce Mobridge ORGANS: Status post cholecystectomy with mild to moderate intra and extrahepatic biliary dilatation. Pneumobilia. .  Visualized portions of the liver, spleen, pancreas, gallbladder, and adrenal glands demonstrate no acute abnormality. GI/BOWEL: No bowel obstruction. No evidence of acute appendicitis. Status post gastric bypass PELVIS: The bladder and pelvic organs are unremarkable. PERITONEUM/RETROPERITONEUM: No free air or free fluid is noted. No pathologically enlarged lymphadenopathy. The vasculature do not demonstrate acute abnormality. BONES/SOFT TISSUES: The osseous structures demonstrate no acute abnormality. Head CT: No acute intracranial abnormality. No acute territorial infarction, intracranial hemorrhage or mass lesion. CT of the abdomen and pelvis: No acute pathology within the abdomen and pelvis. Multiple nonobstructing left kidney stones. Status post gastric bypass with mild to moderate intra and extrahepatic biliary dilatation and pneumobilia. 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 No further follow-up is needed. CT ABDOMEN PELVIS W IV CONTRAST Additional Contrast? None    Result Date: 4/28/2022  EXAMINATION: CT OF THE HEAD WITHOUT CONTRAST; CT OF THE ABDOMEN AND PELVIS WITH CONTRAST 4/28/2022 7:27 pm TECHNIQUE: CT of the head was performed without the administration of intravenous contrast. Dose modulation, iterative reconstruction, and/or weight based adjustment of the mA/kV was utilized to reduce the radiation dose to as low as reasonably achievable.; CT of the abdomen and pelvis was performed with the administration of intravenous contrast. Multiplanar reformatted images are provided for review. Dose modulation, iterative reconstruction, and/or weight based adjustment of the mA/kV was utilized to reduce the radiation dose to as low as reasonably achievable. COMPARISON: 08/28/2021 HISTORY: ORDERING SYSTEM PROVIDED HISTORY: seizure fall head trauma TECHNOLOGIST PROVIDED HISTORY: Reason for exam:->seizure fall head trauma Has a \"code stroke\" or \"stroke alert\" been called? ->No Decision Support Exception - unselect if not a suspected or confirmed emergency medical condition->Emergency Medical Condition (MA) Is the patient pregnant?->No Reason for Exam: seizure fall head trauma FINDINGS: Head CT: BRAIN/VENTRICLES: There is no acute intracranial hemorrhage, mass effect or midline shift. No abnormal extra-axial fluid collection. The gray-white differentiation is maintained without evidence of an acute infarct. There is no evidence of hydrocephalus.  ORBITS: The visualized portion of the orbits demonstrate no acute abnormality. SINUSES: The visualized paranasal sinuses and mastoid air cells demonstrate no acute abnormality. SOFT TISSUES/SKULL:  No acute abnormality of the visualized skull or soft tissues. CT of the abdomen and pelvis : LOWER CHEST:  Visualized portion of the lower chest demonstrates no acute abnormality. Calcified granuloma within the left lower lobe. KIDNEYS AND URINARY TRACT: Multiple 2-4 mm nonobstructing stones the left kidney. .  There is no evidence for hydronephrosis. The ureters are of normal course and caliber. 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 No further follow-up is needed. Shaaron Money ORGANS: Status post cholecystectomy with mild to moderate intra and extrahepatic biliary dilatation. Pneumobilia. .  Visualized portions of the liver, spleen, pancreas, gallbladder, and adrenal glands demonstrate no acute abnormality. GI/BOWEL: No bowel obstruction. No evidence of acute appendicitis. Status post gastric bypass PELVIS: The bladder and pelvic organs are unremarkable. PERITONEUM/RETROPERITONEUM: No free air or free fluid is noted. No pathologically enlarged lymphadenopathy. The vasculature do not demonstrate acute abnormality. BONES/SOFT TISSUES: The osseous structures demonstrate no acute abnormality. Head CT: No acute intracranial abnormality. No acute territorial infarction, intracranial hemorrhage or mass lesion. CT of the abdomen and pelvis: No acute pathology within the abdomen and pelvis. Multiple nonobstructing left kidney stones. Status post gastric bypass with mild to moderate intra and extrahepatic biliary dilatation and pneumobilia. 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 No further follow-up is needed.        Electronically signed by Marvel Howard MD on 4/29/2022 at 8:58 PM

## 2022-05-01 LAB
ANION GAP SERPL CALCULATED.3IONS-SCNC: 10 MMOL/L (ref 4–16)
BASOPHILS ABSOLUTE: 0 K/CU MM
BASOPHILS RELATIVE PERCENT: 0.9 % (ref 0–1)
BUN BLDV-MCNC: 11 MG/DL (ref 6–23)
CALCIUM SERPL-MCNC: 9.4 MG/DL (ref 8.3–10.6)
CHLORIDE BLD-SCNC: 107 MMOL/L (ref 99–110)
CO2: 22 MMOL/L (ref 21–32)
CREAT SERPL-MCNC: 0.7 MG/DL (ref 0.6–1.1)
DIFFERENTIAL TYPE: ABNORMAL
EOSINOPHILS ABSOLUTE: 0.2 K/CU MM
EOSINOPHILS RELATIVE PERCENT: 6.3 % (ref 0–3)
GFR AFRICAN AMERICAN: >60 ML/MIN/1.73M2
GFR NON-AFRICAN AMERICAN: >60 ML/MIN/1.73M2
GLUCOSE BLD-MCNC: 123 MG/DL (ref 70–99)
HCT VFR BLD CALC: 31.8 % (ref 37–47)
HEMOGLOBIN: 9.9 GM/DL (ref 12.5–16)
IMMATURE NEUTROPHIL %: 0.3 % (ref 0–0.43)
LYMPHOCYTES ABSOLUTE: 1.2 K/CU MM
LYMPHOCYTES RELATIVE PERCENT: 37.5 % (ref 24–44)
MCH RBC QN AUTO: 27.2 PG (ref 27–31)
MCHC RBC AUTO-ENTMCNC: 31.1 % (ref 32–36)
MCV RBC AUTO: 87.4 FL (ref 78–100)
MONOCYTES ABSOLUTE: 0.2 K/CU MM
MONOCYTES RELATIVE PERCENT: 6.9 % (ref 0–4)
NUCLEATED RBC %: 0 %
PDW BLD-RTO: 13.2 % (ref 11.7–14.9)
PLATELET # BLD: 163 K/CU MM (ref 140–440)
PMV BLD AUTO: 10.7 FL (ref 7.5–11.1)
POTASSIUM SERPL-SCNC: 4.5 MMOL/L (ref 3.5–5.1)
RBC # BLD: 3.64 M/CU MM (ref 4.2–5.4)
SEGMENTED NEUTROPHILS ABSOLUTE COUNT: 1.5 K/CU MM
SEGMENTED NEUTROPHILS RELATIVE PERCENT: 48.1 % (ref 36–66)
SODIUM BLD-SCNC: 139 MMOL/L (ref 135–145)
TOTAL IMMATURE NEUTOROPHIL: 0.01 K/CU MM
TOTAL NUCLEATED RBC: 0 K/CU MM
WBC # BLD: 3.2 K/CU MM (ref 4–10.5)

## 2022-05-01 PROCEDURE — 6360000002 HC RX W HCPCS: Performed by: NURSE PRACTITIONER

## 2022-05-01 PROCEDURE — 82550 ASSAY OF CK (CPK): CPT

## 2022-05-01 PROCEDURE — 6360000002 HC RX W HCPCS: Performed by: INTERNAL MEDICINE

## 2022-05-01 PROCEDURE — G0378 HOSPITAL OBSERVATION PER HR: HCPCS

## 2022-05-01 PROCEDURE — 96372 THER/PROPH/DIAG INJ SC/IM: CPT

## 2022-05-01 PROCEDURE — 1200000000 HC SEMI PRIVATE

## 2022-05-01 PROCEDURE — 94761 N-INVAS EAR/PLS OXIMETRY MLT: CPT

## 2022-05-01 PROCEDURE — 96376 TX/PRO/DX INJ SAME DRUG ADON: CPT

## 2022-05-01 PROCEDURE — A4216 STERILE WATER/SALINE, 10 ML: HCPCS | Performed by: INTERNAL MEDICINE

## 2022-05-01 PROCEDURE — 2580000003 HC RX 258: Performed by: INTERNAL MEDICINE

## 2022-05-01 PROCEDURE — 99233 SBSQ HOSP IP/OBS HIGH 50: CPT | Performed by: NURSE PRACTITIONER

## 2022-05-01 PROCEDURE — 6370000000 HC RX 637 (ALT 250 FOR IP): Performed by: STUDENT IN AN ORGANIZED HEALTH CARE EDUCATION/TRAINING PROGRAM

## 2022-05-01 PROCEDURE — 36591 DRAW BLOOD OFF VENOUS DEVICE: CPT

## 2022-05-01 PROCEDURE — 85025 COMPLETE CBC W/AUTO DIFF WBC: CPT

## 2022-05-01 PROCEDURE — 80048 BASIC METABOLIC PNL TOTAL CA: CPT

## 2022-05-01 PROCEDURE — 6370000000 HC RX 637 (ALT 250 FOR IP): Performed by: INTERNAL MEDICINE

## 2022-05-01 RX ORDER — OXYCODONE AND ACETAMINOPHEN 7.5; 325 MG/1; MG/1
1 TABLET ORAL EVERY 6 HOURS PRN
Status: DISCONTINUED | OUTPATIENT
Start: 2022-05-01 | End: 2022-05-02 | Stop reason: HOSPADM

## 2022-05-01 RX ORDER — MORPHINE SULFATE 2 MG/ML
2 INJECTION, SOLUTION INTRAMUSCULAR; INTRAVENOUS ONCE
Status: COMPLETED | OUTPATIENT
Start: 2022-05-01 | End: 2022-05-01

## 2022-05-01 RX ADMIN — LEVETIRACETAM 1000 MG: 500 TABLET, FILM COATED ORAL at 09:39

## 2022-05-01 RX ADMIN — DIAZEPAM 2 MG: 2 TABLET ORAL at 22:35

## 2022-05-01 RX ADMIN — ENOXAPARIN SODIUM 30 MG: 100 INJECTION SUBCUTANEOUS at 09:39

## 2022-05-01 RX ADMIN — GABAPENTIN 800 MG: 400 CAPSULE ORAL at 14:26

## 2022-05-01 RX ADMIN — LEVETIRACETAM 1000 MG: 500 TABLET, FILM COATED ORAL at 19:56

## 2022-05-01 RX ADMIN — ONDANSETRON 4 MG: 2 INJECTION INTRAMUSCULAR; INTRAVENOUS at 19:55

## 2022-05-01 RX ADMIN — GABAPENTIN 800 MG: 400 CAPSULE ORAL at 09:39

## 2022-05-01 RX ADMIN — OXYCODONE AND ACETAMINOPHEN 1 TABLET: 7.5; 325 TABLET ORAL at 19:34

## 2022-05-01 RX ADMIN — PANTOPRAZOLE SODIUM 40 MG: 40 TABLET, DELAYED RELEASE ORAL at 06:25

## 2022-05-01 RX ADMIN — TIZANIDINE 4 MG: 4 TABLET ORAL at 12:58

## 2022-05-01 RX ADMIN — DIAZEPAM 2 MG: 2 TABLET ORAL at 10:40

## 2022-05-01 RX ADMIN — OXYCODONE AND ACETAMINOPHEN 1 TABLET: 7.5; 325 TABLET ORAL at 13:00

## 2022-05-01 RX ADMIN — ESTRADIOL 0.5 MG: 1 TABLET ORAL at 09:39

## 2022-05-01 RX ADMIN — MECLIZINE HYDROCHLORIDE 25 MG: 25 TABLET ORAL at 01:43

## 2022-05-01 RX ADMIN — MORPHINE SULFATE 2 MG: 2 INJECTION, SOLUTION INTRAMUSCULAR; INTRAVENOUS at 06:26

## 2022-05-01 RX ADMIN — SODIUM CHLORIDE, PRESERVATIVE FREE 10 ML: 5 INJECTION INTRAVENOUS at 21:00

## 2022-05-01 RX ADMIN — LEVOTHYROXINE SODIUM 125 MCG: 0.12 TABLET ORAL at 06:25

## 2022-05-01 RX ADMIN — Medication 10 MG: at 22:34

## 2022-05-01 RX ADMIN — ENOXAPARIN SODIUM 30 MG: 100 INJECTION SUBCUTANEOUS at 19:55

## 2022-05-01 RX ADMIN — ACETAMINOPHEN 650 MG: 325 TABLET ORAL at 00:57

## 2022-05-01 RX ADMIN — PROMETHAZINE HYDROCHLORIDE 12.5 MG: 25 INJECTION INTRAMUSCULAR; INTRAVENOUS at 00:51

## 2022-05-01 RX ADMIN — MORPHINE SULFATE 2 MG: 2 INJECTION, SOLUTION INTRAMUSCULAR; INTRAVENOUS at 02:00

## 2022-05-01 RX ADMIN — PROMETHAZINE HYDROCHLORIDE 12.5 MG: 25 INJECTION INTRAMUSCULAR; INTRAVENOUS at 22:00

## 2022-05-01 RX ADMIN — Medication 10 MG: at 01:42

## 2022-05-01 RX ADMIN — PANTOPRAZOLE SODIUM 40 MG: 40 TABLET, DELAYED RELEASE ORAL at 16:27

## 2022-05-01 RX ADMIN — HYDROXYZINE PAMOATE 50 MG: 25 CAPSULE ORAL at 09:39

## 2022-05-01 RX ADMIN — ONDANSETRON 4 MG: 2 INJECTION INTRAMUSCULAR; INTRAVENOUS at 05:45

## 2022-05-01 RX ADMIN — ACETAMINOPHEN 650 MG: 325 TABLET ORAL at 22:35

## 2022-05-01 RX ADMIN — LORAZEPAM 1 MG: 2 INJECTION INTRAMUSCULAR; INTRAVENOUS at 05:39

## 2022-05-01 RX ADMIN — PAROXETINE 45 MG: 10 TABLET, FILM COATED ORAL at 19:57

## 2022-05-01 RX ADMIN — HYDROXYZINE PAMOATE 50 MG: 25 CAPSULE ORAL at 19:57

## 2022-05-01 RX ADMIN — GABAPENTIN 800 MG: 400 CAPSULE ORAL at 19:55

## 2022-05-01 ASSESSMENT — PAIN SCALES - GENERAL
PAINLEVEL_OUTOF10: 8
PAINLEVEL_OUTOF10: 2
PAINLEVEL_OUTOF10: 7
PAINLEVEL_OUTOF10: 7
PAINLEVEL_OUTOF10: 8
PAINLEVEL_OUTOF10: 2
PAINLEVEL_OUTOF10: 5
PAINLEVEL_OUTOF10: 6
PAINLEVEL_OUTOF10: 4
PAINLEVEL_OUTOF10: 2
PAINLEVEL_OUTOF10: 7
PAINLEVEL_OUTOF10: 4
PAINLEVEL_OUTOF10: 2
PAINLEVEL_OUTOF10: 2
PAINLEVEL_OUTOF10: 1
PAINLEVEL_OUTOF10: 2

## 2022-05-01 ASSESSMENT — PAIN SCALES - WONG BAKER
WONGBAKER_NUMERICALRESPONSE: 4
WONGBAKER_NUMERICALRESPONSE: 2

## 2022-05-01 ASSESSMENT — PAIN DESCRIPTION - LOCATION: LOCATION: ABDOMEN

## 2022-05-01 ASSESSMENT — PAIN DESCRIPTION - ORIENTATION: ORIENTATION: RIGHT

## 2022-05-01 ASSESSMENT — PAIN DESCRIPTION - DESCRIPTORS: DESCRIPTORS: SHARP

## 2022-05-01 NOTE — PLAN OF CARE
Problem: Discharge Planning  Goal: Discharge to home or other facility with appropriate resources  Outcome: Progressing     Problem: Pain  Goal: Verbalizes/displays adequate comfort level or baseline comfort level  Outcome: Progressing     Problem: ABCDS Injury Assessment  Goal: Absence of physical injury  Outcome: Progressing     Problem: Safety - Adult  Goal: Free from fall injury  Outcome: Progressing

## 2022-05-01 NOTE — PROGRESS NOTES
Patient has had no emesis today and is tolerating PO meds well. She has been up and ambulating on the unit today. No sign of seizure today.

## 2022-05-01 NOTE — PLAN OF CARE
Problem: Discharge Planning  Goal: Discharge to home or other facility with appropriate resources  5/1/2022 0010 by Amado Adam RN  Outcome: Progressing  4/30/2022 1524 by Elvia Hart RN  Outcome: Progressing     Problem: Pain  Goal: Verbalizes/displays adequate comfort level or baseline comfort level  5/1/2022 0010 by Amado Adam RN  Outcome: Progressing  4/30/2022 1524 by Elvia Hart RN  Outcome: Progressing     Problem: ABCDS Injury Assessment  Goal: Absence of physical injury  5/1/2022 0010 by Amado Adam RN  Outcome: Progressing  4/30/2022 1524 by Elvia Hart RN  Outcome: Progressing     Problem: Safety - Adult  Goal: Free from fall injury  5/1/2022 0010 by Amado Adam RN  Outcome: Progressing  4/30/2022 1524 by Elvia Hart RN  Outcome: Progressing

## 2022-05-01 NOTE — PROGRESS NOTES
Pt had an episode of seizure, lasted about 2-3 minutes, pt vomited on bed. 1 mg of Ativan given. Provider contacted.

## 2022-05-02 VITALS
TEMPERATURE: 97.7 F | RESPIRATION RATE: 16 BRPM | HEIGHT: 64 IN | DIASTOLIC BLOOD PRESSURE: 72 MMHG | SYSTOLIC BLOOD PRESSURE: 107 MMHG | WEIGHT: 255.3 LBS | HEART RATE: 58 BPM | BODY MASS INDEX: 43.58 KG/M2 | OXYGEN SATURATION: 98 %

## 2022-05-02 LAB
ANION GAP SERPL CALCULATED.3IONS-SCNC: 10 MMOL/L (ref 4–16)
BASOPHILS ABSOLUTE: 0 K/CU MM
BASOPHILS RELATIVE PERCENT: 1 % (ref 0–1)
BUN BLDV-MCNC: 13 MG/DL (ref 6–23)
CALCIUM SERPL-MCNC: 9.7 MG/DL (ref 8.3–10.6)
CHLORIDE BLD-SCNC: 106 MMOL/L (ref 99–110)
CO2: 23 MMOL/L (ref 21–32)
CREAT SERPL-MCNC: 0.7 MG/DL (ref 0.6–1.1)
DIFFERENTIAL TYPE: ABNORMAL
EOSINOPHILS ABSOLUTE: 0.2 K/CU MM
EOSINOPHILS RELATIVE PERCENT: 5.7 % (ref 0–3)
GFR AFRICAN AMERICAN: >60 ML/MIN/1.73M2
GFR NON-AFRICAN AMERICAN: >60 ML/MIN/1.73M2
GLUCOSE BLD-MCNC: 98 MG/DL (ref 70–99)
HCT VFR BLD CALC: 33 % (ref 37–47)
HEMOGLOBIN: 10.3 GM/DL (ref 12.5–16)
IMMATURE NEUTROPHIL %: 0.3 % (ref 0–0.43)
LYMPHOCYTES ABSOLUTE: 1.5 K/CU MM
LYMPHOCYTES RELATIVE PERCENT: 37.6 % (ref 24–44)
MCH RBC QN AUTO: 27.7 PG (ref 27–31)
MCHC RBC AUTO-ENTMCNC: 31.2 % (ref 32–36)
MCV RBC AUTO: 88.7 FL (ref 78–100)
MONOCYTES ABSOLUTE: 0.3 K/CU MM
MONOCYTES RELATIVE PERCENT: 8.5 % (ref 0–4)
NUCLEATED RBC %: 0 %
PDW BLD-RTO: 13.2 % (ref 11.7–14.9)
PLATELET # BLD: 163 K/CU MM (ref 140–440)
PMV BLD AUTO: 10.5 FL (ref 7.5–11.1)
POTASSIUM SERPL-SCNC: 4.7 MMOL/L (ref 3.5–5.1)
RBC # BLD: 3.72 M/CU MM (ref 4.2–5.4)
SEGMENTED NEUTROPHILS ABSOLUTE COUNT: 1.8 K/CU MM
SEGMENTED NEUTROPHILS RELATIVE PERCENT: 46.9 % (ref 36–66)
SODIUM BLD-SCNC: 139 MMOL/L (ref 135–145)
TOTAL CK: 61 IU/L (ref 26–140)
TOTAL CK: 75 IU/L (ref 26–140)
TOTAL IMMATURE NEUTOROPHIL: 0.01 K/CU MM
TOTAL NUCLEATED RBC: 0 K/CU MM
WBC # BLD: 3.9 K/CU MM (ref 4–10.5)

## 2022-05-02 PROCEDURE — 6370000000 HC RX 637 (ALT 250 FOR IP): Performed by: INTERNAL MEDICINE

## 2022-05-02 PROCEDURE — A4216 STERILE WATER/SALINE, 10 ML: HCPCS | Performed by: INTERNAL MEDICINE

## 2022-05-02 PROCEDURE — 2580000003 HC RX 258: Performed by: INTERNAL MEDICINE

## 2022-05-02 PROCEDURE — 36415 COLL VENOUS BLD VENIPUNCTURE: CPT

## 2022-05-02 PROCEDURE — 94761 N-INVAS EAR/PLS OXIMETRY MLT: CPT

## 2022-05-02 PROCEDURE — 96372 THER/PROPH/DIAG INJ SC/IM: CPT

## 2022-05-02 PROCEDURE — 36591 DRAW BLOOD OFF VENOUS DEVICE: CPT

## 2022-05-02 PROCEDURE — 85025 COMPLETE CBC W/AUTO DIFF WBC: CPT

## 2022-05-02 PROCEDURE — G0378 HOSPITAL OBSERVATION PER HR: HCPCS

## 2022-05-02 PROCEDURE — 6360000002 HC RX W HCPCS: Performed by: INTERNAL MEDICINE

## 2022-05-02 PROCEDURE — 6360000002 HC RX W HCPCS: Performed by: STUDENT IN AN ORGANIZED HEALTH CARE EDUCATION/TRAINING PROGRAM

## 2022-05-02 PROCEDURE — 96376 TX/PRO/DX INJ SAME DRUG ADON: CPT

## 2022-05-02 PROCEDURE — 82550 ASSAY OF CK (CPK): CPT

## 2022-05-02 PROCEDURE — 80048 BASIC METABOLIC PNL TOTAL CA: CPT

## 2022-05-02 PROCEDURE — 6370000000 HC RX 637 (ALT 250 FOR IP): Performed by: STUDENT IN AN ORGANIZED HEALTH CARE EDUCATION/TRAINING PROGRAM

## 2022-05-02 RX ORDER — PROMETHAZINE HYDROCHLORIDE 12.5 MG/1
12.5 TABLET ORAL 3 TIMES DAILY PRN
Qty: 6 TABLET | Refills: 0 | Status: SHIPPED | OUTPATIENT
Start: 2022-05-02 | End: 2022-05-04

## 2022-05-02 RX ORDER — DIAZEPAM 2 MG/1
2 TABLET ORAL EVERY 12 HOURS PRN
Qty: 4 TABLET | Refills: 0 | Status: SHIPPED | OUTPATIENT
Start: 2022-05-02 | End: 2022-05-04

## 2022-05-02 RX ORDER — HEPARIN SODIUM (PORCINE) LOCK FLUSH IV SOLN 100 UNIT/ML 100 UNIT/ML
100 SOLUTION INTRAVENOUS PRN
Status: DISCONTINUED | OUTPATIENT
Start: 2022-05-02 | End: 2022-05-02 | Stop reason: HOSPADM

## 2022-05-02 RX ORDER — METHYLPREDNISOLONE SODIUM SUCCINATE 40 MG/ML
40 INJECTION, POWDER, LYOPHILIZED, FOR SOLUTION INTRAMUSCULAR; INTRAVENOUS ONCE
Status: COMPLETED | OUTPATIENT
Start: 2022-05-02 | End: 2022-05-02

## 2022-05-02 RX ADMIN — ENOXAPARIN SODIUM 30 MG: 100 INJECTION SUBCUTANEOUS at 10:03

## 2022-05-02 RX ADMIN — PANTOPRAZOLE SODIUM 40 MG: 40 TABLET, DELAYED RELEASE ORAL at 06:27

## 2022-05-02 RX ADMIN — DIAZEPAM 2 MG: 2 TABLET ORAL at 10:02

## 2022-05-02 RX ADMIN — PROMETHAZINE HYDROCHLORIDE 12.5 MG: 25 INJECTION INTRAMUSCULAR; INTRAVENOUS at 10:16

## 2022-05-02 RX ADMIN — LEVETIRACETAM 1000 MG: 500 TABLET, FILM COATED ORAL at 10:02

## 2022-05-02 RX ADMIN — TIZANIDINE 4 MG: 4 TABLET ORAL at 04:53

## 2022-05-02 RX ADMIN — SODIUM CHLORIDE, PRESERVATIVE FREE 10 ML: 5 INJECTION INTRAVENOUS at 10:03

## 2022-05-02 RX ADMIN — GABAPENTIN 800 MG: 400 CAPSULE ORAL at 10:02

## 2022-05-02 RX ADMIN — LEVOTHYROXINE SODIUM 125 MCG: 0.12 TABLET ORAL at 06:27

## 2022-05-02 RX ADMIN — METHYLPREDNISOLONE SODIUM SUCCINATE 40 MG: 40 INJECTION, POWDER, FOR SOLUTION INTRAMUSCULAR; INTRAVENOUS at 10:02

## 2022-05-02 RX ADMIN — HYDROXYZINE PAMOATE 50 MG: 25 CAPSULE ORAL at 10:02

## 2022-05-02 RX ADMIN — ESTRADIOL 0.5 MG: 1 TABLET ORAL at 10:02

## 2022-05-02 RX ADMIN — OXYCODONE AND ACETAMINOPHEN 1 TABLET: 7.5; 325 TABLET ORAL at 10:43

## 2022-05-02 RX ADMIN — OXYCODONE AND ACETAMINOPHEN 1 TABLET: 7.5; 325 TABLET ORAL at 03:53

## 2022-05-02 RX ADMIN — ONDANSETRON 4 MG: 2 INJECTION INTRAMUSCULAR; INTRAVENOUS at 04:29

## 2022-05-02 ASSESSMENT — PAIN SCALES - GENERAL
PAINLEVEL_OUTOF10: 4
PAINLEVEL_OUTOF10: 0
PAINLEVEL_OUTOF10: 0
PAINLEVEL_OUTOF10: 7
PAINLEVEL_OUTOF10: 0
PAINLEVEL_OUTOF10: 0
PAINLEVEL_OUTOF10: 2
PAINLEVEL_OUTOF10: 2
PAINLEVEL_OUTOF10: 0
PAINLEVEL_OUTOF10: 0
PAINLEVEL_OUTOF10: 2
PAINLEVEL_OUTOF10: 3
PAINLEVEL_OUTOF10: 2
PAINLEVEL_OUTOF10: 5
PAINLEVEL_OUTOF10: 5

## 2022-05-02 ASSESSMENT — PAIN SCALES - WONG BAKER
WONGBAKER_NUMERICALRESPONSE: 2
WONGBAKER_NUMERICALRESPONSE: 0
WONGBAKER_NUMERICALRESPONSE: 0
WONGBAKER_NUMERICALRESPONSE: 2
WONGBAKER_NUMERICALRESPONSE: 2
WONGBAKER_NUMERICALRESPONSE: 0
WONGBAKER_NUMERICALRESPONSE: 2
WONGBAKER_NUMERICALRESPONSE: 4
WONGBAKER_NUMERICALRESPONSE: 0

## 2022-05-02 ASSESSMENT — PAIN DESCRIPTION - LOCATION
LOCATION: GENERALIZED
LOCATION: ABDOMEN

## 2022-05-02 ASSESSMENT — PAIN DESCRIPTION - DESCRIPTORS: DESCRIPTORS: ACHING

## 2022-05-02 ASSESSMENT — PAIN DESCRIPTION - ORIENTATION: ORIENTATION: MID

## 2022-05-02 NOTE — PLAN OF CARE
Problem: Discharge Planning  Goal: Discharge to home or other facility with appropriate resources  5/2/2022 1201 by Figueroa Garrison RN  Outcome: Completed  5/1/2022 2207 by Nicolas Hall RN  Outcome: Progressing     Problem: Pain  Goal: Verbalizes/displays adequate comfort level or baseline comfort level  5/2/2022 1201 by Figueroa Garrison RN  Outcome: Completed  5/1/2022 2207 by Nicolas Hall RN  Outcome: Progressing     Problem: ABCDS Injury Assessment  Goal: Absence of physical injury  5/2/2022 1201 by Figueroa Garrison RN  Outcome: Completed  5/1/2022 2207 by Nicolas Hall RN  Outcome: Progressing     Problem: Safety - Adult  Goal: Free from fall injury  5/2/2022 1201 by Figueroa Garrison RN  Outcome: Completed  5/1/2022 2207 by Nicolas Hall RN  Outcome: Progressing

## 2022-05-02 NOTE — PROGRESS NOTES
Received bedside report from [address] [place] [address], night shift nurse, assumed care of patient. Patient resting in bed quietly, sating well on room air, showing SB on tele monitor. Patient states generalized joint pain at this time. Patient presents with port in left upper chest. Patient is oriented to room, bed in lowest position with wheels locked, bedside table and call light in reach with visitor at bedside. Discharge paperwork reviewed in detail - no concerns at this time. Patient escorted via wheelchair by NA to main entrance, released to self care.

## 2022-05-02 NOTE — CONSULTS
[address], [address] [facility]                                  CONSULTATION    PATIENT NAME: Sumeet Faith                  :        1970  MED REC NO:   [id number]                          ROOM:       [id number]  ACCOUNT NO:   [de-identified]                           ADMIT DATE: 2022  PROVIDER:     Lyle Rowe MD    CONSULT DATE:  2022    CHIEF COMPLAINT:  Intractable right upper quadrant abdominal pain along  with nausea and vomiting. HISTORY OF PRESENT ILLNESS:  As follows: The patient is a 49-year-old  white female patient known to me from her multiple previous  hospitalizations, last seen in consult on 2021, with past medical  history significant for history of morbid obesity status post initial  sleeve gastrectomy by Dr. Nevaeh Monreal and then subsequently converted to  Jet-en-Y gastric bypass on 2021, history of lupus, hypertension,  depression/anxiety, gastroesophageal reflux disease, peptic ulcer  disease, hypothyroidism, history of gallstone status post  cholecystectomy complicated by bile duct injury with at least seven or  eight ERCPs done in [place], [place] and extensive workup done for  sphincter of Oddi dysfunction, for which the patient had  biliary/pancreatic stents placed, which were subsequently removed; also  history of acute/recurrent/chronic pancreatitis, anemia, chronic back  pain, fibromyalgia, history of recurrent episodes of upper GI bleeding  requiring multiple EGDs, seizure disorder, and chronic pain syndrome. The patient was admitted to the hospital on 2022, with intractable  upper abdominal pain, especially right upper quadrant along with nausea,  vomiting and some loose BM. There is no history of hematemesis, melena,  or hematochezia. The patient also is having seizures. The blood workup  done comprised of chem profile, LFTs, and CBC which are unremarkable. Stool studies are still pending. CT scan of the abdomen, the patient  has had multiple CT scans done of the abdomen and pelvis, last CT scan  on 04/28/2022 was negative for acute findings. The patient is  hemodynamically stable and is on Protonix as well. The patient has had an extensive GI workup done in the past comprising  of multiple CT scans and also multiple EGDs, at least three EGDs by me  and at least four by Dr. Za Watt.  The last EGD by Dr. Za Watt on  04/02/2019, showed bleeding from the distal staple line. The patient's  last EGD on 06/19/2019 was unremarkable. The patient also has had  colonoscopy done in the past as well and is being followed up by a pain  specialist also for her intractable upper abdominal pain. The patient  has been to [facility] also in 2020 for chronic right upper  quadrant abdominal pain and possible ERCP but no ERCP was done, but the  patient was discharged to be followed up locally here in [facility]. The patient had an MRCP done on 09/25/2020, which was unremarkable. The  patient is hemodynamically stable. REVIEW OF SYSTEMS:  CENTRAL NERVOUS SYSTEM EXAMINATION:  The patient denies headache or  focal sensorimotor symptoms. CARDIOVASCULAR SYSTEM:  The patient denies chest pain or shortness of  breath but complains of leg swelling. GENITOURINARY SYSTEM:  No history of dysuria, pyuria, or hematuria. MUSCULOSKELETAL SYSTEM:  The patient complains of generalized weakness. RESPIRATORY SYSTEM:  No history of cough, hemoptysis, fever, or chills.     PAST MEDICAL HISTORY:  Significant for history of morbid obesity status  post sleeve gastrectomy done initially by Dr. Za Watt, which was  converted to Jet-en-Y gastric bypass with lysis of adhesions on  08/16/2021 and also history of lupus, hypertension, depression/anxiety,  gastroesophageal reflux disease, peptic ulcer disease, hypothyroidism,  history of gallstones status post cholecystectomy complicated by bile  duct injury where at least seven or eight ERCPs done in [address] [place] [address] with extensive workup for sphincter of Oddi dysfunction for  which the patient had biliary/pancreatic stents placed which were  subsequently removed, and also history of acute/recurrent/chronic  pancreatitis, anemia, chronic back pain, fibromyalgia, history of  recurrent episodes of upper GI bleeding requiring multiple EGDs, seizure  disorder, chronic pain syndrome. FAMILY HISTORY:  The patient's father was diagnosed with carcinoma of  the prostate, maternal aunt with carcinoma of the breast, maternal  grandmother with carcinoma of the stomach, and maternal grandfather with  carcinoma of the lung. MEDICATIONS:  Please refer to chart. SOCIOECONOMIC HISTORY:  No history of EtOH abuse. The patient does not  smoke cigarettes. SURGERIES:  The patient has had total abdominal hysterectomy,  cholecystectomy, MediPort placed which got infected and was removed by  Dr. 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 Val Melchor eight years ago and at least seven  or eight ERCPs done in [address] [place] [address] for common bile duct  stone/sphincter of Oddi dysfunction and planned placement of  biliary/pancreatic stents which were subsequently removed. The patient  also has had multiple EGDs done at least two by Dr. Paige Oleary, three by me,  and four by Dr. Carmela Zafar and also had surgery done on her left foot for  osteomyelitis and had a sleeve gastrectomy done initially by Dr. Carmela Zafar  which was converted to Jet-en-Y gastric bypass on 2021, with  lysis of adhesions. ALLERGIES:  The patient is allergic to ASPIRIN, SHELLFISH, TORADOL,  COMPAZINE, TROMETHAMINE, HALDOL, and REGLAN.     PHYSICAL EXAMINATION:  GENERAL:  A 57-year-old white female who is morbidly obese, lying flat  in bed complaining of right upper quadrant abdominal pain. She is  awake, alert and oriented and pleasant to talk with. VITAL SIGNS:  Stable. HEENT:  Examination shows skull to be atraumatic. NECK:  Supple. CHEST:  Clear. HEART:  S1, S2 is normal.  ABDOMEN:  Obese, soft with tenderness in the upper abdomen. No guarding  or rigidity. Liver and spleen are not palpable. Bowel sounds are  present. RECTAL:  Exam is deferred. CNS:  Exam shows the patient to be awake, alert, and oriented. There is  no focal sensorimotor sign. MUSCULOSKELETAL SYSTEM:  Exam shows edema of the lower extremities. LABORATORY DATA:  As above mentioned. IMPRESSION:  3  A 53-year-old white female with multiple comorbidities admitted with  upper abdominal pain along with nausea, vomiting and loose BM, rule out  gastroenteritis. 2.  Chronic pain syndrome. 3.  No evidence of GI bleeding at present. RECOMMENDATIONS:  1. Agree with present management with Protonix. 2.  We will check the patient's CBC, chem profile, amylase, lipase level  in a.m.  3.  The patient has been instructed to follow up with Pain Management  also after discharge from the hospital and with bariatric surgeon also  for further workup. 4.  A repeat EGD later as an outpatient if needed. 5.  The case and plan have been discussed in detail with the patient and  her friend and all their questions have been answered. 6.  We will follow up on stool studies for GI disease panel and for C.  diff toxin.         Marixa Muse MD    D: 05/02/2022 6:38:37       T: 05/02/2022 6:41:41     [email or web address]  Job#: [id number]     Doc#: [id number]    CC:

## 2022-05-02 NOTE — DISCHARGE SUMMARY
Discharge Summary    Name:  Jono Prater /Age/Sex: 1970  (46 y.o. female)   MRN & CSN:  [id number] & [id number] Admission Date/Time: 2022  3:44 PM   Attending:  Abelardo Skinner MD Discharging Physician: Abelardo Skinner MD     Hospital Course:   Jono Prater is a 46 y.o.  female  who presents with Diarrhea    Unlikely gastroenteritis, ? psychogenic nausea and vomiting: Patient has chronic complaints of nausea, vomiting and diarrhea. She was recently discharged from [facility] for similar complaints. CT abdomen shows no acute pathology. Continue antiemetics prn. Electrolytes are within normal limits. Last EGD significant for only mild gastritis. Offered the patient a consult with psychiatry which she declined. Seen by gastroenterology. No repeat EGD recommended at this time. She will follow up with her bariatric surgeon as outpatient. Breakthrough seizure versus pseudoseizure: The patient has a history of pseudoseizures. She was recently admitted to [facility] for overnight monitoring for seizures and nausea control. Did not have any true seizure activity during that admission and was requesting pain medications stating that her chronic pain triggers seizures. She exhibited similar behaviour during this hospitalization. Noted to be shaking/shivering in bed but no true seizure activity. CK 61. Noncontrast head CT shows no acute intracranial abnormality. Clear for discharge by neurology. She will follow up with them as outpatient for video EEG. Continue Keppra. Advised against driving. Hypothyroidism: Continue levothyroxine    History of sleeve gastrectomy: Continue multivitamin    Hypertension: Antihypertensive medications have been held as blood pressures are relatively low. PCP follow up on discharge. Chronic pain: She will continue follow-up with pain management on discharge    Anxiety: Patient was previously on a short course of diazepam as outpatient in 2021.  States that this helped her. Will prescribe diazepam 2mg po q12h prn for 2 days and she will follow up with [facility] for ongoing care. The patient expressed appropriate understanding of and agreement with the discharge recommendations, medications, and plan. Consults this admission:  IP CONSULT TO HOSPITALIST  IP CONSULT TO NEUROLOGY  IP CONSULT TO GI    Discharge Instruction:   Follow up appointments: [facility], neurology, GI, pain management, podiatry, bariatric surgery. Primary care physician:  within 2 weeks    Diet:  regular diet   Activity: activity as tolerated  Disposition: Discharged to:   [x]Home, [][facility], []SNF, []Acute Rehab, []Hospice   Condition on discharge: Stable    Discharge Medications:        Medication List      START taking these medications    diazePAM 2 MG tablet  Commonly known as: VALIUM  Take 1 tablet by mouth every 12 hours as needed for Anxiety or Sleep for up to 2 days.         CONTINUE taking these medications    * albuterol (2.5 MG/3ML) 0.083% nebulizer solution  Commonly known as: PROVENTIL  Take 3 mLs by nebulization every 6 hours as needed for Wheezing     * albuterol sulfate  (90 Base) MCG/ACT inhaler  Inhale 2 puffs into the lungs 4 times daily     benzocaine 10 % mucosal gel  Commonly known as: ORAJEL  Apply to the ulcer 2-3 times a day     betamethasone valerate 0.1 % ointment  Commonly known as: VALISONE     CALCIUM + VIT D (BARIATRIC ADVANTAGE) CHEWABLE TABLET  Take 1 tablet by mouth 2 times daily     estradiol 0.5 MG tablet  Commonly known as: Estrace  Take 1 tablet by mouth daily     ferrous sulfate 325 (65 Fe) MG tablet  Commonly known as: IRON 325  Take 1 tablet by mouth daily (with breakfast)     hydrOXYzine 50 MG capsule  Commonly known as: VISTARIL  Take 1 capsule by mouth 2 times daily     levETIRAcetam 1000 MG tablet  Commonly known as: KEPPRA  Take 1 tablet by mouth twice daily     levothyroxine 125 MCG tablet  Commonly known as: SYNTHROID  Take 1 tablet by mouth Daily     meclizine 25 MG tablet  Commonly known as: ANTIVERT     Melatonin 10 MG Tabs     MVI (BARIATRIC ADVANTAGE MULTI-FORMULA) CHEW TAB  Take 1 tablet by mouth daily     Narcan 4 MG/0.1ML Liqd nasal spray  Generic drug: naloxone     Neurontin 800 MG tablet  Generic drug: gabapentin     ondansetron 4 MG disintegrating tablet  Commonly known as: Zofran ODT  Take 1 tablet by mouth every 4 hours as needed for Nausea or Vomiting     oxyCODONE-acetaminophen 5-325 MG per tablet  Commonly known as: PERCOCET     pantoprazole 40 MG tablet  Commonly known as: Protonix  Take 1 tablet by mouth 2 times daily     PARoxetine 30 MG tablet  Commonly known as: PAXIL  Take 1.5 tablets by mouth nightly     potassium gluconate 550 mg tablet     scopolamine transdermal patch  Commonly known as: TRANSDERM-SCOP  APPLY 1 PATCH TRANSDERMALLY TO THE SKIN BEHIND THE EAR ONCE EVERY 3 DAYS AS NEEDED     tiZANidine 4 MG tablet  Commonly known as: ZANAFLEX     VITAMIN B 12 PO     Vitamin D3 125 MCG (5000 UT) Tabs         * This list has 2 medication(s) that are the same as other medications prescribed for you. Read the directions carefully, and ask your doctor or other care provider to review them with you.             STOP taking these medications    atenolol 25 MG tablet  Commonly known as: TENORMIN     cloNIDine 0.1 MG tablet  Commonly known as: CATAPRES     ibuprofen 800 MG tablet  Commonly known as: ADVIL;MOTRIN     promethazine 25 MG tablet  Commonly known as: PHENERGAN           Where to Get Your Medications      These medications were sent to [address] [place] [address], [address]  [place] [address] [place] [address] [facility] [address]    Phone: [phone]   diazePAM 2 MG tablet         Objective Findings at Discharge:   /72   Pulse 58   Temp 97.7 °F (36.5 °C) (Oral)   Resp 14   Ht 5' 4\" (1.626 m)   Wt 255 lb 4.8 oz (115.8 kg)   SpO2 93%   BMI 43.82 kg/m²            PHYSICAL EXAM   GEN Awake female, sitting upright in bed in no apparent distress. Appears given age. EYES Pupils are equally round. No scleral erythema, discharge, or conjunctivitis. HENT Mucous membranes are moist. Oral pharynx without exudates, no evidence of thrush. NECK Supple, no apparent thyromegaly or masses. RESP Clear to auscultation, no wheezes, rales or rhonchi. Symmetric chest movement while on room air. CARDIO/VASC S1/S2 auscultated. Regular rate without appreciable murmurs, rubs, or gallops. No JVD or carotid bruits. Peripheral pulses equal bilaterally and palpable. No peripheral edema. GI Abdomen is soft without significant tenderness, masses, or guarding. Bowel sounds are normoactive. Rectal exam deferred.  No costovertebral angle tenderness. Normal appearing external genitalia. Ryder catheter is not present. HEME/LYMPH No palpable cervical lymphadenopathy and no hepatosplenomegaly. No petechiae or ecchymoses. MSK No gross joint deformities. SKIN Normal coloration, warm, dry. NEURO Cranial nerves appear grossly intact, normal speech, no lateralizing weakness. PSYCH Awake, alert, oriented x 4. Affect appropriate. BMP/CBC  Recent Labs     04/30/22  0739 05/01/22  0600 05/02/22  0706    139 139   K 3.7 4.5 4.7    107 106   CO2 20* 22 23   BUN 10 11 13   CREATININE 0.7 0.7 0.7   WBC 2.9* 3.2* 3.9*   HCT 33.2* 31.8* 33.0*    163 163       IMAGING:  CT head  No acute intracranial pathology    CT Abdo/pelvis  No acute pathology within the abdomen and pelvis.  Multiple nonobstructing   left kidney stones.       Status post gastric bypass with mild to moderate intra and extrahepatic   biliary dilatation and pneumobilia.       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  No   further follow-up is needed.      Discharge Time of 60 minutes    Electronically signed by Emily Segundo MD on 5/2/2022 at 10:10 AM

## 2022-05-02 NOTE — PROGRESS NOTES
V2.0  [facility] Hospitalist Progress Note      Name:  Andie Macdonald /Age/Sex: 1970  (46 y.o. female)   MRN & CSN:  [id number] & [id number] Encounter Date/Time: 2022 8:58 PM EDT    Location:  [address] [place] [address] PCP: Elaine Tucker MD, MD       Hospital Day: 4    Assessment and Plan:   Andie Macdonald is a 46 y.o. female with pmh of seizure disorder, pseudoseizures, hypertension, hypothyroidism, history of sleeve gastrectomy, GERD, chronic pain, recent cyst removal left foot who presents with Diarrhea    Plan:  Unlikely gastroenteritis, ? psychogenic nausea and vomiting: Patient has chronic complaints of nausea, vomiting and diarrhea. She was recently discharged from [place] for similar complaints. CT abdomen shows no acute pathology. Continue antiemetics prn. Electrolytes are within normal limits. Last EGD significant for only mild gastritis. Case d/w GI and they will see her in the morning. Offered the patient a consult with psychiatry which she declined. Will plan to dc tomorrow after she is seen by GI. Breakthrough seizure versus pseudoseizure: The patient has a history of pseudoseizures. She was recently admitted to [place] for overnight monitoring for seizures and nausea control. Did not have any true seizure activity during that admission and was requesting pain medications stating that her chronic pain triggers seizures. Continue Keppra. Seizure and aspiration precautions. Noncontrast head CT shows no acute intracranial abnormality. She continues to shake/shiver in bed and says this is a prelude to the seizures. She states \"I just need something for the pain\". Friend at bedside describes an episode overnight of \"seizure\" lasting 10 minutes. No urinary incontinence, tonic-clonic activity or tongue biting. She was reportedly vomiting throughout the episode. Nursing staff state that she has been spitting but not vomiting. Got ativan and slept thereafter.    Ordered CPK and other labs which she refused to have drawn. She is asking to see neurology again as she has \"never had this many seizures and something is going on\"  Hypothyroidism: Continue levothyroxine  History of sleeve gastrectomy: Continue multivitamin  Hypertension: Antihypertensive medications have been held as blood pressures are relatively low  Chronic pain: She will continue follow-up with pain management on discharge  Continue home medications for chronic medical conditions unless contraindicated  DVT prophylaxis: Enoxaparin  Disposition Home on 5/2    Diet ADULT DIET; Regular   DVT Prophylaxis [] Lovenox, []  Heparin, [] SCDs, [] Ambulation,  [] Eliquis, [] Xarelto  [] Coumadin   Code Status Full Code   Disposition From: Home  Expected Disposition: Home  Estimated Date of Discharge: 5/12022  Patient requires continued admission due to gastroenteritis, breakthrough seizure versus pseudoseizure   Surrogate Decision Maker/ POA -     Subjective:     Chief Complaint: Abdominal Pain, Seizures (0800 today, takes Keppra), and Emesis     Nuha Fabian is a 46 y.o. female who presents with nausea, vomiting, diarrhea, possible breakthrough seizure. States \"I need something for the pain\" Explained that she has percocet, gabapentin, zanaflex prn ordered. Review of Systems:    Review of Systems    10 point review of systems is negative other than the above    Objective:   No intake or output data in the 24 hours ending 05/01/22 2006     Vitals:   Vitals:    05/01/22 1934   BP: 115/73   Pulse: 56   Resp: 16   Temp: 97.8 °F (36.6 °C)   SpO2: 99%       Physical Exam:     General: NAD  Eyes: EOMI  ENT: neck supple  Cardiovascular: Regular rate and rhythm, no murmurs gallops or rubs. Respiratory: Clear to auscultation  Gastrointestinal: Soft, non tender, no distention, bowel sounds are present, no guarding or rebound tenderness  Genitourinary: no suprapubic tenderness  Musculoskeletal: No edema  Skin: warm, dry  Neuro: Alert.   Psych: Mood appropriate.      Medications:   Medications:    diazePAM  2 mg Oral Q12H    [Held by provider] atenolol  25 mg Oral Daily    [Held by provider] cloNIDine  0.1 mg Oral BID    estradiol  0.5 mg Oral Daily    gabapentin  800 mg Oral TID    hydrOXYzine  50 mg Oral BID    levETIRAcetam  1,000 mg Oral BID    levothyroxine  125 mcg Oral Daily    pantoprazole  40 mg Oral BID    PARoxetine  45 mg Oral Nightly    sodium chloride flush  10 mL IntraVENous 2 times per day    enoxaparin  30 mg SubCUTAneous BID    [Held by provider] CENTRUM/CERTA-LILLY with minerals oral  15 mL Oral Daily      Infusions:    sodium chloride       PRN Meds: oxyCODONE-acetaminophen, 1 tablet, Q6H PRN  promethazine, 12.5 mg, Q6H PRN  albuterol, 2.5 mg, Q6H PRN  meclizine, 25 mg, TID PRN  melatonin, 10 mg, Nightly PRN  tiZANidine, 4 mg, Q8H PRN  sodium chloride flush, 10 mL, PRN  sodium chloride, , PRN  ondansetron, 4 mg, Q8H PRN   Or  ondansetron, 4 mg, Q6H PRN  bisacodyl, 5 mg, Daily PRN  acetaminophen, 650 mg, Q6H PRN   Or  acetaminophen, 650 mg, Q6H PRN  LORazepam, 1 mg, Q5 Min PRN        Labs      Recent Results (from the past 24 hour(s))   Basic Metabolic Panel w/ Reflex to MG    Collection Time: 05/01/22  6:00 AM   Result Value Ref Range    Sodium 139 135 - 145 MMOL/L    Potassium 4.5 3.5 - 5.1 MMOL/L    Chloride 107 99 - 110 mMol/L    CO2 22 21 - 32 MMOL/L    Anion Gap 10 4 - 16    BUN 11 6 - 23 MG/DL    CREATININE 0.7 0.6 - 1.1 MG/DL    Glucose 123 (H) 70 - 99 MG/DL    Calcium 9.4 8.3 - 10.6 MG/DL    GFR Non-African American >60 >60 mL/min/1.73m2    GFR African American >60 >60 mL/min/1.73m2   CBC auto differential    Collection Time: 05/01/22  6:00 AM   Result Value Ref Range    WBC 3.2 (L) 4.0 - 10.5 K/CU MM    RBC 3.64 (L) 4.2 - 5.4 M/CU MM    Hemoglobin 9.9 (L) 12.5 - 16.0 GM/DL    Hematocrit 31.8 (L) 37 - 47 %    MCV 87.4 78 - 100 FL    MCH 27.2 27 - 31 PG    MCHC 31.1 (L) 32.0 - 36.0 %    RDW 13.2 11.7 - 14.9 %    Platelets 163 140 - 440 K/CU MM    MPV 10.7 7.5 - 11.1 FL    Differential Type AUTOMATED DIFFERENTIAL     Segs Relative 48.1 36 - 66 %    Lymphocytes % 37.5 24 - 44 %    Monocytes % 6.9 (H) 0 - 4 %    Eosinophils % 6.3 (H) 0 - 3 %    Basophils % 0.9 0 - 1 %    Segs Absolute 1.5 K/CU MM    Lymphocytes Absolute 1.2 K/CU MM    Monocytes Absolute 0.2 K/CU MM    Eosinophils Absolute 0.2 K/CU MM    Basophils Absolute 0.0 K/CU MM    Nucleated RBC % 0.0 %    Total Nucleated RBC 0.0 K/CU MM    Total Immature Neutrophil 0.01 K/CU MM    Immature Neutrophil % 0.3 0 - 0.43 %        Imaging/Diagnostics Last 24 Hours   CT HEAD WO CONTRAST    Result Date: 4/28/2022  EXAMINATION: CT OF THE HEAD WITHOUT CONTRAST; CT OF THE ABDOMEN AND PELVIS WITH CONTRAST 4/28/2022 7:27 pm TECHNIQUE: CT of the head was performed without the administration of intravenous contrast. Dose modulation, iterative reconstruction, and/or weight based adjustment of the mA/kV was utilized to reduce the radiation dose to as low as reasonably achievable.; CT of the abdomen and pelvis was performed with the administration of intravenous contrast. Multiplanar reformatted images are provided for review. Dose modulation, iterative reconstruction, and/or weight based adjustment of the mA/kV was utilized to reduce the radiation dose to as low as reasonably achievable. COMPARISON: 08/28/2021 HISTORY: ORDERING SYSTEM PROVIDED HISTORY: seizure fall head trauma TECHNOLOGIST PROVIDED HISTORY: Reason for exam:->seizure fall head trauma Has a \"code stroke\" or \"stroke alert\" been called? ->No Decision Support Exception - unselect if not a suspected or confirmed emergency medical condition->Emergency Medical Condition (MA) Is the patient pregnant?->No Reason for Exam: seizure fall head trauma FINDINGS: Head CT: BRAIN/VENTRICLES: There is no acute intracranial hemorrhage, mass effect or midline shift. No abnormal extra-axial fluid collection.   The gray-white differentiation is maintained without evidence of an acute infarct. There is no evidence of hydrocephalus. ORBITS: The visualized portion of the orbits demonstrate no acute abnormality. SINUSES: The visualized paranasal sinuses and mastoid air cells demonstrate no acute abnormality. SOFT TISSUES/SKULL:  No acute abnormality of the visualized skull or soft tissues. CT of the abdomen and pelvis : LOWER CHEST:  Visualized portion of the lower chest demonstrates no acute abnormality. Calcified granuloma within the left lower lobe. KIDNEYS AND URINARY TRACT: Multiple 2-4 mm nonobstructing stones the left kidney. .  There is no evidence for hydronephrosis. The ureters are of normal course and caliber. 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 No further follow-up is needed. Marnell Bogdan ORGANS: Status post cholecystectomy with mild to moderate intra and extrahepatic biliary dilatation. Pneumobilia. .  Visualized portions of the liver, spleen, pancreas, gallbladder, and adrenal glands demonstrate no acute abnormality. GI/BOWEL: No bowel obstruction. No evidence of acute appendicitis. Status post gastric bypass PELVIS: The bladder and pelvic organs are unremarkable. PERITONEUM/RETROPERITONEUM: No free air or free fluid is noted. No pathologically enlarged lymphadenopathy. The vasculature do not demonstrate acute abnormality. BONES/SOFT TISSUES: The osseous structures demonstrate no acute abnormality. Head CT: No acute intracranial abnormality. No acute territorial infarction, intracranial hemorrhage or mass lesion. CT of the abdomen and pelvis: No acute pathology within the abdomen and pelvis. Multiple nonobstructing left kidney stones. Status post gastric bypass with mild to moderate intra and extrahepatic biliary dilatation and pneumobilia. 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 No further follow-up is needed.      CT ABDOMEN PELVIS W IV CONTRAST Additional Contrast? None    Result Date: 4/28/2022  EXAMINATION: CT OF THE HEAD WITHOUT CONTRAST; CT OF THE ABDOMEN AND PELVIS WITH CONTRAST 4/28/2022 7:27 pm TECHNIQUE: CT of the head was performed without the administration of intravenous contrast. Dose modulation, iterative reconstruction, and/or weight based adjustment of the mA/kV was utilized to reduce the radiation dose to as low as reasonably achievable.; CT of the abdomen and pelvis was performed with the administration of intravenous contrast. Multiplanar reformatted images are provided for review. Dose modulation, iterative reconstruction, and/or weight based adjustment of the mA/kV was utilized to reduce the radiation dose to as low as reasonably achievable. COMPARISON: 08/28/2021 HISTORY: ORDERING SYSTEM PROVIDED HISTORY: seizure fall head trauma TECHNOLOGIST PROVIDED HISTORY: Reason for exam:->seizure fall head trauma Has a \"code stroke\" or \"stroke alert\" been called? ->No Decision Support Exception - unselect if not a suspected or confirmed emergency medical condition->Emergency Medical Condition (MA) Is the patient pregnant?->No Reason for Exam: seizure fall head trauma FINDINGS: Head CT: BRAIN/VENTRICLES: There is no acute intracranial hemorrhage, mass effect or midline shift. No abnormal extra-axial fluid collection. The gray-white differentiation is maintained without evidence of an acute infarct. There is no evidence of hydrocephalus. ORBITS: The visualized portion of the orbits demonstrate no acute abnormality. SINUSES: The visualized paranasal sinuses and mastoid air cells demonstrate no acute abnormality. SOFT TISSUES/SKULL:  No acute abnormality of the visualized skull or soft tissues. CT of the abdomen and pelvis : LOWER CHEST:  Visualized portion of the lower chest demonstrates no acute abnormality. Calcified granuloma within the left lower lobe. KIDNEYS AND URINARY TRACT: Multiple 2-4 mm nonobstructing stones the left kidney. .  There is no evidence for hydronephrosis.   The ureters are of normal course and caliber. 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 No further follow-up is needed. Heddy  ORGANS: Status post cholecystectomy with mild to moderate intra and extrahepatic biliary dilatation. Pneumobilia. .  Visualized portions of the liver, spleen, pancreas, gallbladder, and adrenal glands demonstrate no acute abnormality. GI/BOWEL: No bowel obstruction. No evidence of acute appendicitis. Status post gastric bypass PELVIS: The bladder and pelvic organs are unremarkable. PERITONEUM/RETROPERITONEUM: No free air or free fluid is noted. No pathologically enlarged lymphadenopathy. The vasculature do not demonstrate acute abnormality. BONES/SOFT TISSUES: The osseous structures demonstrate no acute abnormality. Head CT: No acute intracranial abnormality. No acute territorial infarction, intracranial hemorrhage or mass lesion. CT of the abdomen and pelvis: No acute pathology within the abdomen and pelvis. Multiple nonobstructing left kidney stones. Status post gastric bypass with mild to moderate intra and extrahepatic biliary dilatation and pneumobilia. 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 No further follow-up is needed.        Electronically signed by Una Frazier MD on 5/1/2022 at 8:06 PM

## 2022-05-02 NOTE — PLAN OF CARE
Problem: Discharge Planning  Goal: Discharge to home or other facility with appropriate resources  5/1/2022 2207 by Belinda Reed RN  Outcome: Progressing  5/1/2022 1459 by Sera Payne LPN  Outcome: Progressing     Problem: Pain  Goal: Verbalizes/displays adequate comfort level or baseline comfort level  5/1/2022 2207 by Belinda Reed RN  Outcome: Progressing  5/1/2022 1459 by Sera Payne LPN  Outcome: Progressing     Problem: ABCDS Injury Assessment  Goal: Absence of physical injury  5/1/2022 2207 by Belinda Reed RN  Outcome: Progressing  5/1/2022 1459 by Sera Payne LPN  Outcome: Progressing     Problem: Safety - Adult  Goal: Free from fall injury  5/1/2022 2207 by Belinda Reed RN  Outcome: Progressing  5/1/2022 1459 by Sera Payne LPN  Outcome: Progressing

## 2022-05-03 DIAGNOSIS — G40.909 SEIZURE DISORDER (HCC): ICD-10-CM

## 2022-05-03 RX ORDER — LEVETIRACETAM 1000 MG/1
TABLET ORAL
Qty: 60 TABLET | Refills: 3 | Status: SHIPPED | OUTPATIENT
Start: 2022-05-03 | End: 2022-10-25 | Stop reason: SDUPTHER

## 2022-05-03 NOTE — TELEPHONE ENCOUNTER
[facility] Transitions Initial Follow Up Call    Outreach made within 2 business days of discharge: Yes    Patient: Clifford Redd Patient : 1970   MRN: [id number]  Reason for Admission: There are no discharge diagnoses documented for the most recent discharge. Discharge Date: 22       Spoke with: Andre    Discharge department/facility: [facility]    TCM Interactive Patient Contact:  Was patient able to fill all prescriptions: Yes  Was patient instructed to bring all medications to the follow-up visit: Yes  Is patient taking all medications as directed in the discharge summary? Yes  Does patient understand their discharge instructions: Yes  Does patient have questions or concerns that need addressed prior to 7-14 day follow up office visit: no    Scheduled on 22.     Scheduled appointment with PCP within 7-14 days    Follow Up  Future Appointments   Date Time Provider Randy Monzon   2022  4:15 PM MD Odalis Jaime [address]

## 2022-05-06 ENCOUNTER — TELEPHONE (OUTPATIENT)
Dept: INTERNAL MEDICINE CLINIC | Age: 52
End: 2022-05-06

## 2022-05-06 DIAGNOSIS — E03.4 HYPOTHYROIDISM DUE TO ACQUIRED ATROPHY OF THYROID: ICD-10-CM

## 2022-05-06 DIAGNOSIS — E89.40 SURGICAL MENOPAUSE: ICD-10-CM

## 2022-05-06 RX ORDER — ESTRADIOL 0.5 MG/1
0.5 TABLET ORAL DAILY
Qty: 30 TABLET | Refills: 2 | Status: SHIPPED | OUTPATIENT
Start: 2022-05-06 | End: 2022-09-12

## 2022-05-06 RX ORDER — LEVOTHYROXINE SODIUM 0.12 MG/1
125 TABLET ORAL DAILY
Qty: 30 TABLET | Refills: 2 | Status: SHIPPED | OUTPATIENT
Start: 2022-05-06 | End: 2022-10-25 | Stop reason: SDUPTHER

## 2022-05-06 NOTE — TELEPHONE ENCOUNTER
----- Message from Sha Annalisa sent at 5/5/2022  3:13 PM EDT -----  Subject: Refill Request    QUESTIONS  Name of Medication? promethazine (PHENERGAN) 25 MG tablet  Patient-reported dosage and instructions? Take 1 tablet by mouth every 6   hours as needed for Nausea  How many days do you have left? 0  Preferred Pharmacy? [email or web address] phone number (if available)? [phone]  ---------------------------------------------------------------------------  --------------,  Name of Medication? estradiol (ESTRACE) 0.5 MG tablet  Patient-reported dosage and instructions? Take 1 tablet by mouth daily  How many days do you have left? 0  Preferred Pharmacy? [facility] phone number (if available)? [phone]  ---------------------------------------------------------------------------  --------------,  Name of Medication? levothyroxine (SYNTHROID) 125 MCG tablet  Patient-reported dosage and instructions? Take 1 tablet by mouth Daily  How many days do you have left? 0  Preferred Pharmacy? [facility] phone number (if available)? [phone]  ---------------------------------------------------------------------------  --------------  CALL BACK INFO  What is the best way for the office to contact you? OK to leave message on   voicemail  Preferred Call Back Phone Number? [phone]  ---------------------------------------------------------------------------  --------------  SCRIPT ANSWERS  Relationship to Patient?  Self

## 2022-05-27 ENCOUNTER — HOSPITAL ENCOUNTER (EMERGENCY)
Age: 52
Discharge: HOME | End: 2022-05-28
Payer: COMMERCIAL

## 2022-05-27 ENCOUNTER — APPOINTMENT (OUTPATIENT)
Dept: GENERAL RADIOLOGY | Age: 52
End: 2022-05-27
Payer: COMMERCIAL

## 2022-05-27 ENCOUNTER — APPOINTMENT (OUTPATIENT)
Dept: CT IMAGING | Age: 52
End: 2022-05-27
Payer: COMMERCIAL

## 2022-05-27 VITALS
HEART RATE: 73 BPM | RESPIRATION RATE: 16 BRPM | WEIGHT: 240 LBS | SYSTOLIC BLOOD PRESSURE: 112 MMHG | HEIGHT: 64 IN | OXYGEN SATURATION: 97 % | DIASTOLIC BLOOD PRESSURE: 96 MMHG | BODY MASS INDEX: 40.97 KG/M2 | TEMPERATURE: 98.1 F

## 2022-05-27 DIAGNOSIS — R10.9 ABDOMINAL PAIN, UNSPECIFIED ABDOMINAL LOCATION: Primary | ICD-10-CM

## 2022-05-27 DIAGNOSIS — G40.919 BREAKTHROUGH SEIZURE (HCC): ICD-10-CM

## 2022-05-27 DIAGNOSIS — M79.672 LEFT FOOT PAIN: ICD-10-CM

## 2022-05-27 LAB
ALBUMIN SERPL-MCNC: 3.3 GM/DL (ref 3.4–5)
ALP BLD-CCNC: 106 IU/L (ref 40–129)
ALT SERPL-CCNC: 7 U/L (ref 10–40)
ANION GAP SERPL CALCULATED.3IONS-SCNC: 8 MMOL/L (ref 4–16)
AST SERPL-CCNC: 10 IU/L (ref 15–37)
BASOPHILS ABSOLUTE: 0 K/CU MM
BASOPHILS RELATIVE PERCENT: 0.8 % (ref 0–1)
BILIRUB SERPL-MCNC: 0.1 MG/DL (ref 0–1)
BUN BLDV-MCNC: 12 MG/DL (ref 6–23)
CALCIUM SERPL-MCNC: 8.4 MG/DL (ref 8.3–10.6)
CHLORIDE BLD-SCNC: 109 MMOL/L (ref 99–110)
CO2: 20 MMOL/L (ref 21–32)
CREAT SERPL-MCNC: 0.6 MG/DL (ref 0.6–1.1)
DIFFERENTIAL TYPE: ABNORMAL
EOSINOPHILS ABSOLUTE: 0.2 K/CU MM
EOSINOPHILS RELATIVE PERCENT: 4.2 % (ref 0–3)
GFR AFRICAN AMERICAN: >60 ML/MIN/1.73M2
GFR NON-AFRICAN AMERICAN: >60 ML/MIN/1.73M2
GLUCOSE BLD-MCNC: 89 MG/DL (ref 70–99)
HCT VFR BLD CALC: 29.6 % (ref 37–47)
HEMOGLOBIN: 9.3 GM/DL (ref 12.5–16)
IMMATURE NEUTROPHIL %: 0.2 % (ref 0–0.43)
LACTATE: 0.9 MMOL/L (ref 0.4–2)
LIPASE: 14 IU/L (ref 13–60)
LYMPHOCYTES ABSOLUTE: 1 K/CU MM
LYMPHOCYTES RELATIVE PERCENT: 20.2 % (ref 24–44)
MAGNESIUM: 1.7 MG/DL (ref 1.8–2.4)
MCH RBC QN AUTO: 27 PG (ref 27–31)
MCHC RBC AUTO-ENTMCNC: 31.4 % (ref 32–36)
MCV RBC AUTO: 86 FL (ref 78–100)
MONOCYTES ABSOLUTE: 0.3 K/CU MM
MONOCYTES RELATIVE PERCENT: 5.6 % (ref 0–4)
NUCLEATED RBC %: 0 %
PDW BLD-RTO: 13.4 % (ref 11.7–14.9)
PLATELET # BLD: 167 K/CU MM (ref 140–440)
PMV BLD AUTO: 9.9 FL (ref 7.5–11.1)
POTASSIUM SERPL-SCNC: 3.3 MMOL/L (ref 3.5–5.1)
RBC # BLD: 3.44 M/CU MM (ref 4.2–5.4)
SEGMENTED NEUTROPHILS ABSOLUTE COUNT: 3.4 K/CU MM
SEGMENTED NEUTROPHILS RELATIVE PERCENT: 69 % (ref 36–66)
SODIUM BLD-SCNC: 137 MMOL/L (ref 135–145)
TOTAL IMMATURE NEUTOROPHIL: 0.01 K/CU MM
TOTAL NUCLEATED RBC: 0 K/CU MM
TOTAL PROTEIN: 5.5 GM/DL (ref 6.4–8.2)
WBC # BLD: 5 K/CU MM (ref 4–10.5)

## 2022-05-27 PROCEDURE — 6360000002 HC RX W HCPCS: Performed by: PHYSICIAN ASSISTANT

## 2022-05-27 PROCEDURE — 85025 COMPLETE CBC W/AUTO DIFF WBC: CPT

## 2022-05-27 PROCEDURE — 6370000000 HC RX 637 (ALT 250 FOR IP): Performed by: PHYSICIAN ASSISTANT

## 2022-05-27 PROCEDURE — 83605 ASSAY OF LACTIC ACID: CPT

## 2022-05-27 PROCEDURE — 99284 EMERGENCY DEPT VISIT MOD MDM: CPT

## 2022-05-27 PROCEDURE — 96372 THER/PROPH/DIAG INJ SC/IM: CPT

## 2022-05-27 PROCEDURE — 96375 TX/PRO/DX INJ NEW DRUG ADDON: CPT

## 2022-05-27 PROCEDURE — 72125 CT NECK SPINE W/O DYE: CPT

## 2022-05-27 PROCEDURE — 96376 TX/PRO/DX INJ SAME DRUG ADON: CPT

## 2022-05-27 PROCEDURE — 70450 CT HEAD/BRAIN W/O DYE: CPT

## 2022-05-27 PROCEDURE — 83735 ASSAY OF MAGNESIUM: CPT

## 2022-05-27 PROCEDURE — 83690 ASSAY OF LIPASE: CPT

## 2022-05-27 PROCEDURE — 73630 X-RAY EXAM OF FOOT: CPT

## 2022-05-27 PROCEDURE — 2580000003 HC RX 258: Performed by: PHYSICIAN ASSISTANT

## 2022-05-27 PROCEDURE — 96365 THER/PROPH/DIAG IV INF INIT: CPT

## 2022-05-27 PROCEDURE — 80053 COMPREHEN METABOLIC PANEL: CPT

## 2022-05-27 RX ORDER — FENTANYL CITRATE 50 UG/ML
50 INJECTION, SOLUTION INTRAMUSCULAR; INTRAVENOUS EVERY 30 MIN PRN
Status: DISCONTINUED | OUTPATIENT
Start: 2022-05-27 | End: 2022-05-28 | Stop reason: HOSPADM

## 2022-05-27 RX ORDER — POTASSIUM CHLORIDE 750 MG/1
40 TABLET, FILM COATED, EXTENDED RELEASE ORAL ONCE
Status: COMPLETED | OUTPATIENT
Start: 2022-05-27 | End: 2022-05-27

## 2022-05-27 RX ORDER — ONDANSETRON 2 MG/ML
4 INJECTION INTRAMUSCULAR; INTRAVENOUS ONCE
Status: COMPLETED | OUTPATIENT
Start: 2022-05-27 | End: 2022-05-27

## 2022-05-27 RX ORDER — 0.9 % SODIUM CHLORIDE 0.9 %
1000 INTRAVENOUS SOLUTION INTRAVENOUS ONCE
Status: COMPLETED | OUTPATIENT
Start: 2022-05-27 | End: 2022-05-27

## 2022-05-27 RX ADMIN — ONDANSETRON 4 MG: 2 INJECTION INTRAMUSCULAR; INTRAVENOUS at 20:45

## 2022-05-27 RX ADMIN — FENTANYL CITRATE 50 MCG: 50 INJECTION, SOLUTION INTRAMUSCULAR; INTRAVENOUS at 23:19

## 2022-05-27 RX ADMIN — POTASSIUM CHLORIDE 40 MEQ: 750 TABLET, FILM COATED, EXTENDED RELEASE ORAL at 23:19

## 2022-05-27 RX ADMIN — FENTANYL CITRATE 50 MCG: 50 INJECTION, SOLUTION INTRAMUSCULAR; INTRAVENOUS at 21:38

## 2022-05-27 RX ADMIN — SODIUM CHLORIDE 1000 ML: 9 INJECTION, SOLUTION INTRAVENOUS at 20:42

## 2022-05-27 RX ADMIN — LEVETIRACETAM 1000 MG: 100 INJECTION, SOLUTION INTRAVENOUS at 22:00

## 2022-05-27 ASSESSMENT — PAIN DESCRIPTION - LOCATION: LOCATION: ABDOMEN

## 2022-05-27 ASSESSMENT — PAIN SCALES - GENERAL: PAINLEVEL_OUTOF10: 8

## 2022-05-27 NOTE — ED NOTES
Pt intentionally ripped out IV and placed on floor.  Pt has seizure pads in place and tele monitor applied      Sara Snider RN  05/27/22 [id number]

## 2022-05-27 NOTE — ED PROVIDER NOTES
Emergency [address]    Patient: Virgilio Rubinstein  MRN: [id number]  : 1970  Date of Evaluation: 2022  ED Provider: Marianela Miguel PA-C    Chief Complaint       Chief Complaint   Patient presents with    Abdominal Pain    Foot Pain     Left foot    Seizures       Miguel Sinclair is a 46 y.o. female who presents to the emergency department for multiple complaints. Patient with a history of chronic abdominal pain/pancreatitis for which she is well known to our department. Patient states she has been having abdominal pain for several days with associated nausea and vomiting. Constant. Diffuse. Severity 8/10. Worse with PO intake. She has been trying to sip on Pedialyte and take her pain medications but states she couldn't keep them down today. She states she then had a seizure prior to arrival.  She states she has been able to tell that her body is tensing up all day. She states her neurologist said her seizures are due to \"all of the stress my body has gone through the last 6 years. \"  She states she had a seizure while walking through her mother's house and fell and hit her head on the ground. She states she thinks something fell on her left foot during the seizure as well because it is now hurting. States she recently had a cyst removed by Dr. Prince Martin and had been on crutches until last week. ROS     CONSTITUTIONAL:  Denies fever. EYES:  Denies visual changes. HEAD:  Denies headache. ENT:  Denies earache, nasal congestion, sore throat. NECK:  Denies neck pain. RESPIRATORY:  Denies any shortness of breath. CARDIOVASCULAR:  Denies chest pain. GI:  + abd pain, n/v.  :  Denies urinary symptoms. MUSCULOSKELETAL:  + left foot pain. BACK:  Denies back pain. INTEGUMENT:  Denies skin changes. LYMPHATIC:  Denies lymphadenopathy. NEUROLOGIC:  + seizure.     Past History     Past Medical History:   Diagnosis Date    Acid reflux     Anemia     Anxiety     Arthritis     Hands, Back And Ankles    Bleeding ulcer 2014    \"I Had Ulcers In My Stomach And Colon\"/ per pt on 8/12/2019\"they said recently having some blood in my stomach- in July ( 2019)could not find where coming from \"    Bronchitis Last Episode 2014    Chronic back pain     Chronic pain     Sees Dr. Lorrie Cogan COPD (chronic obstructive pulmonary disease) ([facility]     Sees Dr. Saida Hanson Depression     Fibromyalgia Dx 2013    GERD (gastroesophageal reflux disease)     H/O echocardiogram 08/11/2015    EF >55%. LA to be at the upper limit of normal in size. LV hypertrophy with normal LV systolic, but abnormal diastolic function. Normal valvular structures and function.  H/O echocardiogram 08/30/2018    EF 55-60%    Hiatal hernia     History of blood transfusion 09/2015 And 2018    No Reaction To Blood Transfusions Received    History of sleeve gastrectomy     [place] (hard of hearing)     Right Ear    Hx of cardiac catheterization 10/24/2010    Angiographically normal coronary arteries w/ normal LV function and wall motion.  Hx of transesophageal echocardiography (PILO) for monitoring 12/02/2010    EF 55-60%. WNL.    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 name   Freddy Wilfredo cysts     \"they found that I have a kidney cyst but not sure which side\" per pt on 7/15/2020    Lupus ([address] Dx 2013    \"Rheumatoid Lupus\"    MDRO (multiple drug resistant organisms) resistance     4 months ago chest from mediport    Morbid obesity ([address]     MRSA bacteremia     Nausea     \"Most Of The Time\"    Osteomyelitis of fifth toe of left foot ([facility]     Pain management     Sees Dr. Cooper Lopes, Once A Month    Pancreatitis chronic Dx 2001    Pneumonia Dx 11-15    Seizures ([facility]     Shortness of breath on exertion     Shortness of breath on exertion     Sleep apnea     Has CPAP\"no longer use the cpap since lost weight\"    Staph infection Dx 11-15    Left Foot    Staph infection Dx 11-15    \"Left Foot\"    Teeth missing     Upper And Lower    Thyroid disease     Wears glasses     To Read     Past Surgical History:   Procedure Laterality Date    APPENDECTOMY  02/1998    Done When Tubes And Ovaries Were Removed    CARDIAC CATHETERIZATION  10/24/2010    CATHETER REMOVAL N/A 7/16/2019    PORT REMOVAL performed by Kim Marcelo MD at [facility]  08/27/1991    CHOLECYSTECTOMY, LAPAROSCOPIC  1990's    COLONOSCOPY  Last Done In 2000's    DENTAL SURGERY      Teeth Extracted In Past    ENDOSCOPY, COLON, DIAGNOSTIC  Last Done In 2018    FOOT DEBRIDEMENT Left 6/16/2019    FIRST METATARSAL DEBRIDEMENT INCISION AND DRAINAGE. EXCISION OF ULCER.  RESECTION OF BONE. 1ST METATARSAL POWER LAVAGE AND BONE CEMENT performed by Chad Berumen DPM at [address] Left 4/15/2022    LEFT FOOT ABSCESS DEBRIDEMENT INCISION AND DRAINAGE, CYST REMOVAL performed by Chad Berumen DPM at [address] Left Last Done In 2016     Dr. Simi Upton, \" About 6 Surgeries Done Because Of Staph Infection\"    HIATAL HERNIA REPAIR N/A 2/12/2019    HERNIA HIATAL LAPAROSCOPIC ROBOTIC performed by Kim Marcelo MD at [facility]  10/1997    Partial Abdominal Hysterectomy    INSERTION / REMOVAL / REPLACEMENT VENOUS ACCESS CATHETER N/A 8/15/2019    PORT INSERTION performed by Kim Marcelo MD at [address] [place] [address] Avenue  ~2000    removed after 6 months    LEG BIOPSY EXCISION Left 3/8/2021    LEFT THIGH LESION BIOPSY EXCISION performed by Kim Marcelo MD at [facility], PARTIAL Left 2008 Benign    OTHER SURGICAL HISTORY  02/1998    \"Tubes And Ovaries Removed, Appendectomy Also Done\"    OTHER SURGICAL HISTORY  Last Done 7-15-16    Mediport Insertion \"Total Of Six Done, Removed Last Mediport In 2014\"    PORT SURGERY N/A 1/15/2020    PORT REMOVAL performed by Cassie Freeman MD at [facility] N/A 7/6/2020    PORT INSERTION performed by Cassie Freeman MD at [facility] Left 8/3/2021    MEDIPORT REPLACEMENT performed by Cassie Freeman MD at [address] [place] [address] N/A 8/16/2021    GASTRIC BYPASS RUFINO-EN-Y LAPAROSCOPIC ROBOTIC, LYSIS OF ADHESIONS performed by Cassie Freeman MD at [address] [place] [address] N/A 2/12/2019    GASTRECTOMY SLEEVE LAPAROSCOPIC ROBOTIC performed by Cassie Freeman MD at [address] [place] [address]  08/27/2018    UPPER GASTROINTESTINAL ENDOSCOPY N/A 4/2/2019    EGD CONTROL HEMORRHAGE with epi injection at bleeding site performed by Cassie Freeman MD at [address] 6/19/2019    EGD DIAGNOSTIC ONLY [facility] [address] [facility] N/A 7/22/2019    EGD DIAGNOSTIC ONLY performed by Mariela Osman MD at [address] N/A 10/14/2019    EGD DIAGNOSTIC ONLY performed by [facility] MD [facility] [address] [facility] N/A 7/17/2020    EGJ DILATATION BALLOON WITH 18-20 MM BALLOON, DILATED TO 20 MM. performed by Cassie Freeman MD at [address] N/A 5/28/2021    EGD BIOPSY performed by Cassie Freeman MD at [facility] Marital status:      Spouse name: None    Number of children: None    Years of education: None    Highest education level: None   Occupational History    None   Tobacco Use    Smoking status: Never Smoker    Smokeless tobacco: Never Used   Vaping Use    Vaping Use: Never used   Substance and Sexual Activity    Alcohol use: No     Alcohol/week: 0.0 standard drinks    Drug use: No    Sexual activity: Not Currently   Other Topics Concern    None   Social History Narrative    None     Social Determinants of Health     Financial Resource Strain: Low Risk     Difficulty of Paying Living Expenses: Not hard at all   Food Insecurity: No Food Insecurity    Worried About Running Out of Food in the Last Year: Never true    Tyrese of Food in the Last Year: Never true   Transportation Needs:     Lack of Transportation (Medical): Not on file    Lack of Transportation (Non-Medical):  Not on file   Physical Activity:     Days of Exercise per Week: Not on file    Minutes of Exercise per Session: Not on file   Stress:     Feeling of Stress : Not on file   Social Connections:     Frequency of Communication with Friends and Family: Not on file    Frequency of Social Gatherings with Friends and Family: Not on file    Attends Hindu Services: Not on file    Active Member of [address] [place] [address] or Organizations: Not on file    Attends Club or Organization Meetings: Not on file    Marital Status: Not on file   Intimate Partner Violence:     Fear of Current or Ex-Partner: Not on file    Emotionally Abused: Not on file    Physically Abused: Not on file    Sexually Abused: Not on file   Housing Stability:     Unable to Pay for Housing in the Last Year: Not on file    Number of [place] in the Last Year: Not on file    Unstable Housing in the Last Year: Not on file       Medications/Allergies     Discharge Medication List as of 5/28/2022 12:40 AM      CONTINUE these medications which have NOT CHANGED    Details   estradiol (ESTRACE) 0.5 MG tablet Take 1 tablet by mouth daily, Disp-30 tablet, R-2Normal      levothyroxine (SYNTHROID) 125 MCG tablet Take 1 tablet by mouth Daily, Disp-30 tablet, R-2Normal levETIRAcetam (KEPPRA) 1000 MG tablet Take 1 tablet by mouth twice daily, Disp-60 tablet, R-3Normal      pantoprazole (PROTONIX) 40 MG tablet Take 1 tablet by mouth 2 times daily, Disp-60 tablet, R-2Normal      hydrOXYzine (VISTARIL) 50 MG capsule Take 1 capsule by mouth 2 times daily, Disp-30 capsule, R-2Normal      PARoxetine (PAXIL) 30 MG tablet Take 1.5 tablets by mouth nightly, Disp-45 tablet, R-3Normal      benzocaine (ORAJEL) 10 % mucosal gel Apply to the ulcer 2-3 times a day, Disp-1 each, R-0, Normal      albuterol (PROVENTIL) (2.5 MG/3ML) 0.083% nebulizer solution Take 3 mLs by nebulization every 6 hours as needed for Wheezing, Disp-120 each, R-0Normal      albuterol sulfate  (90 Base) MCG/ACT inhaler Inhale 2 puffs into the lungs 4 times daily, Disp-1 each, R-0Normal      oxyCODONE-acetaminophen (PERCOCET) 5-325 MG per tablet TAKE 1 TABLET BY MOUTH EVERY 6 HOURS AS NEEDED FOR 28 DAYSHistorical Med      NARCAN 4 MG/0.1ML LIQD nasal spray USE AS DIRECTED AS NEEDED FOR SUSPECTED OPIOID OVERDOSE, DAWHistorical Med      ondansetron (ZOFRAN ODT) 4 MG disintegrating tablet Take 1 tablet by mouth every 4 hours as needed for Nausea or Vomiting, Disp-30 tablet, R-3Normal      scopolamine (TRANSDERM-SCOP) transdermal patch APPLY 1 PATCH TRANSDERMALLY TO THE SKIN BEHIND THE EAR ONCE EVERY 3 DAYS AS NEEDED, Disp-10 patch, R-0Normal      Melatonin 10 MG TABS Take 10-20 mg by mouth nightly as neededHistorical Med      Cyanocobalamin (VITAMIN B 12 PO) Take 1 tablet by mouth dailyHistorical Med      meclizine (ANTIVERT) 25 MG tablet Take 25 mg by mouth 3 times daily as needed for DizzinessHistorical Med      betamethasone valerate (VALISONE) 0.1 % ointment APPLY OINTMENT TO AFFECTED AREA TWICE DAILY TO HANDS AND ELBOWS FOR 21 DAYS, Historical Med      potassium gluconate 550 mg tablet Take 1 tablet by mouth dailyHistorical Med      tiZANidine (ZANAFLEX) 4 MG tablet Take 4 mg by mouth every 8 hours as needed Historical Med      ferrous sulfate (IRON 325) 325 (65 Fe) MG tablet Take 1 tablet by mouth daily (with breakfast), Disp-90 tablet,R-5Normal      Cholecalciferol (VITAMIN D3) 5000 units TABS Take 1 tablet by mouth dailyHistorical Med      Multiple Vitamin (MVI, BARIATRIC ADVANTAGE MULTI-FORMULA, CHEW TAB) Take 1 tablet by mouth daily, Disp-30 tablet, R-5Normal      Calcium Citrate-Vitamin D (CALCIUM + VIT D, BARIATRIC ADVANTAGE, CHEWABLE TABLET) Take 1 tablet by mouth 2 times daily, Disp-120 tablet, R-5Normal      gabapentin (NEURONTIN) 800 MG tablet Take 800 mg by mouth 3 times daily           Allergies   Allergen Reactions    Aspirin Palpitations     \"My Heart Rate Elevates\"    Compazine [Prochlorperazine] Rash    Shellfish-Derived Products Swelling    Toradol [Ketorolac Tromethamine] Rash    Haldol [Haloperidol]      Shaking      Reglan [Metoclopramide] Itching        Physical Exam       ED Triage Vitals [05/27/22 1752]   BP Temp Temp Source Heart Rate Resp SpO2 Height Weight   (!) 112/96 98.1 °F (36.7 °C) Oral 73 16 97 % 5' 4\" (1.626 m) 240 lb (108.9 kg)     GENERAL APPEARANCE:  Well-developed, well-nourished, no acute distress. HEAD:  NC/AT. EYES:  Sclera anicteric. ENT:  Ears, nose, mouth normal.     NECK:  Supple. CARDIO:  RRR. LUNGS:   CTAB. Respirations unlabored. ABDOMEN:  Soft, non-distended, diffuse tenderness without rebound or guarding. BS active. BACK:  No midline thoracic or lumbar spinal tenderness. EXTREMITIES:  No deformity of the left foot, no swelling, mild diffuse tenderness, DP intact. SKIN:  Warm and dry. NEUROLOGICAL:  Alert and oriented. PSYCHIATRIC:  Normal mood.      Diagnostics     Labs:  Results for orders placed or performed during the hospital encounter of 05/27/22   CBC with Auto Differential   Result Value Ref Range    WBC 5.0 4.0 - 10.5 K/CU MM    RBC 3.44 (L) 4.2 - 5.4 M/CU MM    Hemoglobin 9.3 (L) 12.5 - 16.0 GM/DL    Hematocrit 29.6 (L) 37 - 47 %    MCV 86.0 78 - 100 FL    MCH 27.0 27 - 31 PG    MCHC 31.4 (L) 32.0 - 36.0 %    RDW 13.4 11.7 - 14.9 %    Platelets 959 448 - 196 K/CU MM    MPV 9.9 7.5 - 11.1 FL    Differential Type AUTOMATED DIFFERENTIAL     Segs Relative 69.0 (H) 36 - 66 %    Lymphocytes % 20.2 (L) 24 - 44 %    Monocytes % 5.6 (H) 0 - 4 %    Eosinophils % 4.2 (H) 0 - 3 %    Basophils % 0.8 0 - 1 %    Segs Absolute 3.4 K/CU MM    Lymphocytes Absolute 1.0 K/CU MM    Monocytes Absolute 0.3 K/CU MM    Eosinophils Absolute 0.2 K/CU MM    Basophils Absolute 0.0 K/CU MM    Nucleated RBC % 0.0 %    Total Nucleated RBC 0.0 K/CU MM    Total Immature Neutrophil 0.01 K/CU MM    Immature Neutrophil % 0.2 0 - 0.43 %   Comprehensive Metabolic Panel w/ Reflex to MG   Result Value Ref Range    Sodium 137 135 - 145 MMOL/L    Potassium 3.3 (L) 3.5 - 5.1 MMOL/L    Chloride 109 99 - 110 mMol/L    CO2 20 (L) 21 - 32 MMOL/L    BUN 12 6 - 23 MG/DL    CREATININE 0.6 0.6 - 1.1 MG/DL    Glucose 89 70 - 99 MG/DL    Calcium 8.4 8.3 - 10.6 MG/DL    Albumin 3.3 (L) 3.4 - 5.0 GM/DL    Total Protein 5.5 (L) 6.4 - 8.2 GM/DL    Total Bilirubin 0.1 0.0 - 1.0 MG/DL    ALT 7 (L) 10 - 40 U/L    AST 10 (L) 15 - 37 IU/L    Alkaline Phosphatase 106 40 - 129 IU/L    GFR Non-African American >60 >60 mL/min/1.73m2    GFR African American >60 >60 mL/min/1.73m2    Anion Gap 8 4 - 16   Lactic Acid   Result Value Ref Range    Lactate 0.9 0.4 - 2.0 mMOL/L   Lipase   Result Value Ref Range    Lipase 14 13 - 60 IU/L   Magnesium   Result Value Ref Range    Magnesium 1.7 (L) 1.8 - 2.4 mg/dl       Radiographs:  XR FOOT LEFT (MIN 3 VIEWS)    Result Date: 5/27/2022  EXAMINATION: THREE XRAY VIEWS OF THE LEFT FOOT 5/27/2022 7:20 pm COMPARISON: Left foot radiograph 03/17/2022. HISTORY: ORDERING SYSTEM PROVIDED HISTORY: pain after seizure TECHNOLOGIST PROVIDED HISTORY: Reason for exam:->pain after seizure Reason for Exam: pain after seizure FINDINGS: Three views provided. Mild Achilles tendinopathy.   There is focal soft tissue increased density along the plantar lateral aspect of the MTP joint. No soft tissue gas foci or radiopaque foreign body Normal bone mineral density. 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 Stable 3rd metatarsal head articular collapse with mild secondary osteoarthritis. No acute fracture. 1. No acute osseous abnormality. 2. Stable 3rd metatarsal head articular collapse which may relate to avascular necrosis or prior trauma with mild secondary MTP osteoarthritis. CT HEAD WO CONTRAST    Result Date: 5/27/2022  EXAMINATION: CT OF THE CERVICAL SPINE WITHOUT CONTRAST; CT OF THE HEAD WITHOUT CONTRAST 5/27/2022 8:01 pm TECHNIQUE: CT of the cervical spine was performed without the administration of intravenous contrast. Multiplanar reformatted images are provided for review. Automated exposure control, iterative reconstruction, and/or weight based adjustment of the mA/kV was utilized to reduce the radiation dose to as low as reasonably achievable.; CT of the head was performed without the administration of intravenous contrast. Automated exposure control, iterative reconstruction, and/or weight based adjustment of the mA/kV was utilized to reduce the radiation dose to as low as reasonably achievable. COMPARISON: None. HISTORY: ORDERING SYSTEM PROVIDED HISTORY: seizure, fall Decision Support Exception - unselect if not a suspected or confirmed emergency medical condition->Emergency Medical Condition (MA) Is the patient pregnant?->No Reason for Exam: seizure, fall CT HEAD FINDINGS: BRAIN/VENTRICLES: There is no acute intracranial hemorrhage, mass effect or midline shift. No abnormal extra-axial fluid collection. The gray-white differentiation is maintained without evidence of an acute infarct. There is no evidence of hydrocephalus. ORBITS: The visualized portion of the orbits demonstrate no acute abnormality.  SINUSES: The visualized paranasal sinuses and mastoid air cells demonstrate no acute abnormality. SOFT TISSUES/SKULL:  No acute abnormality of the visualized skull or soft tissues. CT CERVICAL SPINE FINDINGS: BONES/ALIGNMENT: There is no evidence of an acute cervical spine fracture. Atlantoaxial rotation of about 22 degrees is probably related to head positioning in the CT gantry during imaging, however, mild acute traumatic atlantoaxial rotatory subluxation is a consideration given this appearance. Otherwise normal alignment of the cervical spine. DEGENERATIVE CHANGES: No significant degenerative changes. SOFT TISSUES: There is no prevertebral soft tissue swelling. 1. CT HEAD: No acute intracranial abnormality. 2. CT CERVICAL SPINE: No acute cervical spine fracture. 3. Prominent atlantoaxial rotation can be seen with rotatory subluxation or may be related to head positioning within the CT gantry. *Note that if pain persists or worsens, or if clinically there is concern for CT occult acute cervical abnormality, flexion/extension C-spine series or MRI cervical spine may be considered for additional evaluation. CT CERVICAL SPINE WO CONTRAST    Result Date: 5/27/2022  EXAMINATION: CT OF THE CERVICAL SPINE WITHOUT CONTRAST; CT OF THE HEAD WITHOUT CONTRAST 5/27/2022 8:01 pm TECHNIQUE: CT of the cervical spine was performed without the administration of intravenous contrast. Multiplanar reformatted images are provided for review. Automated exposure control, iterative reconstruction, and/or weight based adjustment of the mA/kV was utilized to reduce the radiation dose to as low as reasonably achievable.; CT of the head was performed without the administration of intravenous contrast. Automated exposure control, iterative reconstruction, and/or weight based adjustment of the mA/kV was utilized to reduce the radiation dose to as low as reasonably achievable. COMPARISON: None.  HISTORY: ORDERING SYSTEM PROVIDED HISTORY: seizure, fall Decision Support Exception - unselect if not a suspected or confirmed emergency medical condition->Emergency Medical Condition (MA) Is the patient pregnant?->No Reason for Exam: seizure, fall CT HEAD FINDINGS: BRAIN/VENTRICLES: There is no acute intracranial hemorrhage, mass effect or midline shift. No abnormal extra-axial fluid collection. The gray-white differentiation is maintained without evidence of an acute infarct. There is no evidence of hydrocephalus. ORBITS: The visualized portion of the orbits demonstrate no acute abnormality. SINUSES: The visualized paranasal sinuses and mastoid air cells demonstrate no acute abnormality. SOFT TISSUES/SKULL:  No acute abnormality of the visualized skull or soft tissues. CT CERVICAL SPINE FINDINGS: BONES/ALIGNMENT: There is no evidence of an acute cervical spine fracture. Atlantoaxial rotation of about 22 degrees is probably related to head positioning in the CT gantry during imaging, however, mild acute traumatic atlantoaxial rotatory subluxation is a consideration given this appearance. Otherwise normal alignment of the cervical spine. DEGENERATIVE CHANGES: No significant degenerative changes. SOFT TISSUES: There is no prevertebral soft tissue swelling. 1. CT HEAD: No acute intracranial abnormality. 2. CT CERVICAL SPINE: No acute cervical spine fracture. 3. Prominent atlantoaxial rotation can be seen with rotatory subluxation or may be related to head positioning within the CT gantry. *Note that if pain persists or worsens, or if clinically there is concern for CT occult acute cervical abnormality, flexion/extension C-spine series or MRI cervical spine may be considered for additional evaluation. ED Course and MDM   -  Patient seen and evaluated in the emergency department. -  Triage and nursing notes reviewed and incorporated. -  Old chart records reviewed and incorporated. -  Supervising physician was Dr. Tiff Granados. Patient was seen independently.   -  Work-up included:  see above  -  ED medications:  NS, Zofran, Phenergan, Keppra, Fentanyl, Percocet  -  Results discussed with patient. Stable anemia. Mild hypokalemia--replacement given here in the ED. Labs otherwise unremarkable. CT head and c-spine and XR of left foot are non-acute as well. On re-examination, patient is tolerating PO intake. She plans to FU with Dr. Edmund Sanchez next week for her chronic abdominal pain. We discussed need for FU with her neurologist as well--appears from last admission they have plans for outpatient EEG. Loaded with Keppra. She feels comfortable with discharge home, FU as discussed, or return for new/worsening symptoms.    -  Disposition:  Home    In light of current events, I did utilize appropriate PPE (including N95 and surgical face mask, safety glasses, and gloves, as recommended by the health facility/national standard best practice, during my bedside interactions with the patient. Final Impression      1. Abdominal pain, unspecified abdominal location    2. Breakthrough seizure ([address]    3.  Left foot pain          DISPOSITION Decision To Discharge 05/28/2022 12:08:19 AM      Hilda Louis PA-C  [address] [place] [address]  05/28/22 [id number]

## 2022-05-27 NOTE — PROGRESS NOTES
PT had episode of \"shaking\". Staff assist called, ER nurses came and got her to stop.  Scans completed

## 2022-05-28 PROCEDURE — 6370000000 HC RX 637 (ALT 250 FOR IP): Performed by: PHYSICIAN ASSISTANT

## 2022-05-28 PROCEDURE — 6360000002 HC RX W HCPCS: Performed by: PHYSICIAN ASSISTANT

## 2022-05-28 RX ORDER — PROMETHAZINE HYDROCHLORIDE 25 MG/ML
25 INJECTION, SOLUTION INTRAMUSCULAR; INTRAVENOUS ONCE
Status: COMPLETED | OUTPATIENT
Start: 2022-05-28 | End: 2022-05-28

## 2022-05-28 RX ORDER — HEPARIN SODIUM (PORCINE) LOCK FLUSH IV SOLN 100 UNIT/ML 100 UNIT/ML
100 SOLUTION INTRAVENOUS ONCE
Status: COMPLETED | OUTPATIENT
Start: 2022-05-28 | End: 2022-05-28

## 2022-05-28 RX ORDER — OXYCODONE HYDROCHLORIDE AND ACETAMINOPHEN 5; 325 MG/1; MG/1
1 TABLET ORAL ONCE
Status: COMPLETED | OUTPATIENT
Start: 2022-05-28 | End: 2022-05-28

## 2022-05-28 RX ADMIN — HEPARIN 100 UNITS: 100 SYRINGE at 00:35

## 2022-05-28 RX ADMIN — OXYCODONE HYDROCHLORIDE AND ACETAMINOPHEN 1 TABLET: 5; 325 TABLET ORAL at 00:34

## 2022-05-28 RX ADMIN — PROMETHAZINE HYDROCHLORIDE 25 MG: 25 INJECTION INTRAMUSCULAR; INTRAVENOUS at 00:34

## 2022-05-28 NOTE — ACP (ADVANCE CARE PLANNING)
Patient requested that Jaswinder Andrade- mother [phone] - supplemental decision maker be inactivated from patients' Epic Chart.  CM completed request.

## 2022-05-28 NOTE — ED NOTES
Patient able to eat and drink without emesis before leaving     Monika Schwartz [place]  05/28/22 7327

## 2022-05-28 NOTE — ED NOTES
Access to smart port completed     Padmini Quigley, [facility]  05/27/22 4653

## 2022-06-03 NOTE — ED PROVIDER NOTES
Note patient will stop azathioprine once she starts Cimzia.   Loading and maintenance doses ordered, for patient planning pregnancy with disease refractory to azathioprine with radiographic progression Triage Chief Complaint:   Dizziness (onset 3-4 days ago. reports dizziness and blurry vision) and Abdominal Pain (hx of pancreatitis, reports right upper abd pain radiating around to her back with N/V/D)      [place]:  Claudette Hodge is a 44 y.o. female that presents to the emergency department stating that she has had some abdominal pain that radiates across her back with nausea, vomiting, diarrhea. Denies any fevers or chills. States she has Percocet at home for pain but she started vomiting and was unable to keep her pain meds down. She states she also has had a history of low blood count in the past secondary to stomach ulcers. She denies any vomiting blood or blood in her stool but states she feels fatigued and lightheaded like she has in the past with a low blood count. States she has had some lightheadedness and feels like she had vision changes that started 3 to 4 days ago. She is not having any difficulty with speech or confusion. No weakness specifically in one side of her body. [place] Arielle He cites a slight frontal headache. Denies any trauma. Has chronic pain and sees Dr. Raegan Frye at the pain clinic. Patient states she has both Zofran and Phenergan at home but they were not helping her symptoms. Past Medical History:   Diagnosis Date    Acid reflux     Anemia     Anxiety     Arthritis     Hands, Back And Ankles    Bleeding ulcer 2014    \"I Had Ulcers In My Stomach And Colon\"/ per pt on 8/12/2019\"they said recently having some blood in my stomach- in July ( 2019)could not find where coming from \"    Bronchitis Last Episode 2014    Chronic back pain     Chronic pain     Sees Dr. Jessy Roman COPD (chronic obstructive pulmonary disease) ([facility] [address]     Sees Dr. Rodas Session Depression     Fibromyalgia Dx 2013    H/O echocardiogram 8/11/15    EF >55%. LA to be at the upper limit of normal in size. LV hypertrophy with normal LV systolic, but abnormal diastolic function.  Normal valvular structures and function.  H/O echocardiogram 08/30/2018    EF 55-60%    Hiatal hernia     History of blood transfusion 09/2015 And 2018    No Reaction To Blood Transfusions Received    Native (hard of hearing)     Right Ear    Hx of cardiac catheterization 10/24/2010    Angiographically normal coronary arteries w/ normal LV function and wall motion.  Hx of transesophageal echocardiography (PILO) for monitoring 12/2/2010    EF 55-60%. WNL.    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 name    Lupus [facility]) Dx 2013    \"Rheumatoid Lupus\"    Morbid obesity ([address]     Nausea     \"Most Of The Time\"    Pain management     Sees Dr. Meg Monteiro, Once A Month    Pancreatitis chronic Dx 2001    Pneumonia Dx 11-15    Shortness of breath on exertion     Shortness of breath on exertion     Sleep apnea     Has CPAP\"no longer use the cpap since lost weight\"    Staph infection Dx 11-15    Left Foot    Staph infection Dx 11-15    \"Left Foot\"    Teeth missing     Upper And Lower    Thyroid disease     UTI (urinary tract infection) In Past    No Current Symptoms    Wears glasses     To Read     Past Surgical History:   Procedure Laterality Date    APPENDECTOMY  02/1998    Done When Tubes And Ovaries Were Removed    CARDIAC CATHETERIZATION  10/24/2010    CATHETER REMOVAL N/A 7/16/2019    PORT REMOVAL performed by Demetri Loya MD at [address] [facility]  08/27/1991    CHOLECYSTECTOMY, LAPAROSCOPIC  1990's    COLONOSCOPY  Last Done In 2000's    DENTAL SURGERY      Teeth Extracted In Past    ENDOSCOPY, COLON, DIAGNOSTIC  Last Done In 2018    FOOT DEBRIDEMENT Left 6/16/2019    FIRST METATARSAL DEBRIDEMENT INCISION AND DRAINAGE. EXCISION OF ULCER.  RESECTION OF BONE. 1ST METATARSAL POWER LAVAGE AND BONE CEMENT performed by Toi Urias DPM at [address] Left Last Done In 2016     Dr. Senait Zelaya, \" About 6 Surgeries Done Because Of Staph Infection\"    HIATAL HERNIA REPAIR N/A 2/12/2019    HERNIA HIATAL LAPAROSCOPIC ROBOTIC performed by Saima Bergeron MD at [facility]  10/1997    Partial Abdominal Hysterectomy    INSERTION / REMOVAL / REPLACEMENT VENOUS ACCESS CATHETER N/A 8/15/2019    PORT INSERTION performed by Saima Bergeron MD at [address] [facility]    removed after 6 months    LUNG REMOVAL, PARTIAL Left 2008    Benign    OTHER SURGICAL HISTORY  02/1998    \"Tubes And Ovaries Removed, Appendectomy Also Done\"    OTHER SURGICAL HISTORY  Last Done 7-15-16    Mediport Insertion \"Total Of Six Done, Removed Last Mediport In 2014\"    SLEEVE GASTRECTOMY N/A 2/12/2019    GASTRECTOMY SLEEVE LAPAROSCOPIC ROBOTIC performed by Saima Bergeron MD at [address] [place] [address]  08/27/2018    UPPER GASTROINTESTINAL ENDOSCOPY N/A 4/2/2019    EGD CONTROL HEMORRHAGE with epi injection at bleeding site performed by Saima Bergeron MD at [facility] 6/19/2019    EGD DIAGNOSTIC ONLY performed by Saima Bergeron MD at [facility] N/A 7/22/2019    EGD DIAGNOSTIC ONLY performed by Lynn Swanson MD at [facility] N/A 10/14/2019    EGD DIAGNOSTIC ONLY performed by Saima Bergeron MD at [facility] ENDOSCOPY     Family History   Problem Relation Age of Onset    Diabetes Mother         \"Borderline Diabetes\"    High Blood Pressure Mother     Obesity Mother    Blake Eleazar Arthritis Mother     Heart Disease Mother     High Cholesterol Mother     Vision Loss Mother     Diabetes Father     High Blood Pressure Father     Asthma Father     Cancer Father         prostate cancer    High Blood Pressure Brother     Asthma Son     Vision Loss Son     Lupus Daughter     Other Daughter         \"Alot Of Female Problems\"     Social History     Socioeconomic History    Marital status:      Spouse name: Not on file    Number of children: Not on file    Years of education: Not on file    Highest education level: Not on file   Occupational History    Not on file   Social Needs    Financial resource strain: Not on file    Food insecurity:     Worry: Not on file     Inability: Not on file    Transportation needs:     Medical: Not on file     Non-medical: Not on file   Tobacco Use    Smoking status: Never Smoker    Smokeless tobacco: Never Used   Substance and Sexual Activity    Alcohol use: No     Alcohol/week: 0.0 standard drinks    Drug use: No    Sexual activity: Not Currently   Lifestyle    Physical activity:     Days per week: Not on file     Minutes per session: Not on file    Stress: Not on file   Relationships    Social connections:     Talks on phone: Not on file     Gets together: Not on file     Attends Jew service: Not on file     Active member of club or organization: Not on file     Attends meetings of clubs or organizations: Not on file     Relationship status: Not on file    Intimate partner violence:     Fear of current or ex partner: Not on file     Emotionally abused: Not on file     Physically abused: Not on file     Forced sexual activity: Not on file   Other Topics Concern    Not on file   Social History Narrative    Not on file     Current Facility-Administered Medications   Medication Dose Route Frequency Provider Last Rate Last Dose    sodium chloride flush 0.9 % injection 10 mL  10 mL Intravenous BID Donell Menchaca MD   10 mL at 01/09/20 1805    ondansetron (ZOFRAN) injection 4 mg  4 mg Intravenous Q30 Min PRN Donell Menchaca MD   4 mg at 01/09/20 1824     Current Outpatient [place]] Rash    Reglan [Metoclopramide Hcl] Rash    Shellfish-Derived Products Swelling    Toradol [Ketorolac Tromethamine] Rash     Nursing Notes Reviewed    ROS:  At least 10 systems reviewed and otherwise negative except as in the [address] Physical Exam:  ED Triage Vitals [01/09/20 1342]   Enc Vitals Group      BP (!) 136/49      Pulse 62      Resp 18      Temp 98.1 °F (36.7 °C)      Temp Source Oral      SpO2 96 %      Weight 260 lb (117.9 kg)      Height 5' 4\" (1.626 m)      Head Circumference       Peak Flow       Pain Score       Pain Loc       Pain Edu? Excl. in [address]? My pulse oximetry interpretation is which is within the normal range    GENERAL APPEARANCE: Awake and alert. Cooperative. No acute distress. HEAD: Normocephalic. Atraumatic. EYES: EOM's grossly intact. Sclera anicteric. ENT: Mucous membranes are moist. Tolerates saliva. No trismus. NECK: Supple. No meningismus. Trachea midline. HEART: RRR. Radial pulses 2+. LUNGS: Respirations unlabored. CTAB. No wheezing or stridor. Normal respiratory rate. No respiratory distress. ABDOMEN: Soft. . No guarding or rebound. Elevated BMI. Abdominal exam difficult secondary to this. Mild tenderness in epigastrium. EXTREMITIES: No acute deformities. SKIN: Warm and dry. NEUROLOGICAL: No gross facial drooping. Moves all 4 extremities spontaneously. Patient is awake, alert, oriented x4. Speech is clear. Answers all questions appropriately. No pronator drift. Strength is equal in all extremities. Sensation intact. PSYCHIATRIC: Normal mood.     I have reviewed and interpreted all of the currently available lab results from this visit (if applicable):  Results for orders placed or performed during the hospital encounter of 01/09/20   CBC auto diff   Result Value Ref Range    WBC 5.0 4.0 - 10.5 K/CU MM    RBC 4.41 4.2 - 5.4 M/CU MM    Hemoglobin 11.6 (L) 12.5 - 16.0 GM/DL    Hematocrit 37.8 37 - 47 %    MCV 85.7 78 - 100 FL    MCH 26.3 (L) 27 - 31 PG    MCHC 30.7 (L) 32.0 - 36.0 %    RDW 14.1 11.7 - 14.9 %    Platelets 334 303 - 095 K/CU MM    MPV 10.2 7.5 - 11.1 FL    Differential Type AUTOMATED DIFFERENTIAL     Segs Relative 48.6 36 - 66 %    Lymphocytes % 37.3 24 - 44 %    Monocytes % 7.9 (H) 0 - 4 %    Eosinophils % 5.4 (H) 0 - 3 %    Basophils % 0.6 0 - 1 %    Segs Absolute 2.4 K/CU MM    Lymphocytes Absolute 1.9 K/CU MM    Monocytes Absolute 0.4 K/CU MM    Eosinophils Absolute 0.3 K/CU MM    Basophils Absolute 0.0 K/CU MM    Nucleated RBC % 0.0 %    Total Nucleated RBC 0.0 K/CU MM    Total Immature Neutrophil 0.01 K/CU MM    Immature Neutrophil % 0.2 0 - 0.43 %   CMP   Result Value Ref Range    Sodium 135 135 - 145 MMOL/L    Potassium 5.0 3.5 - 5.1 MMOL/L    Chloride 106 99 - 110 mMol/L    CO2 19 (L) 21 - 32 MMOL/L    BUN 15 6 - 23 MG/DL    CREATININE 0.6 0.6 - 1.1 MG/DL    Glucose 103 (H) 70 - 99 MG/DL    Calcium 9.8 8.3 - 10.6 MG/DL    Alb 3.8 3.4 - 5.0 GM/DL    Total Protein 6.2 (L) 6.4 - 8.2 GM/DL    Total Bilirubin 0.3 0.0 - 1.0 MG/DL    ALT 20 10 - 40 U/L    AST 28 15 - 37 IU/L    Alkaline Phosphatase 121 40 - 129 IU/L    GFR Non-African American >60 >60 mL/min/1.73m2    GFR African American >60 >60 mL/min/1.73m2    Anion Gap 10 4 - 16   Lipase   Result Value Ref Range    Lipase 12 (L) 13 - 60 IU/L   Urinalysis   Result Value Ref Range    Color, UA YELLOW YELLOW    Clarity, UA HAZY (A) CLEAR    Glucose, Urine NEGATIVE NEGATIVE MG/DL    Bilirubin Urine NEGATIVE NEGATIVE MG/DL    Ketones, Urine NEGATIVE NEGATIVE MG/DL    Specific Gravity, UA 1.015 1.001 - 1.035    Blood, Urine NEGATIVE NEGATIVE    pH, Urine 7.0 5.0 - 8.0    Protein, UA NEGATIVE NEGATIVE MG/DL    Urobilinogen, Urine NORMAL 0.2 - 1.0 MG/DL    Nitrite Urine, Quantitative NEGATIVE NEGATIVE    Leukocyte Esterase, Urine NEGATIVE NEGATIVE    RBC, UA NONE SEEN 0 - 6 /HPF    WBC, UA <1 0 - 5 /HPF    Bacteria, UA RARE (A) NEGATIVE /HPF    Squam Epithel, UA 14 /HPF    Trichomonas, adrenal glands, spleen and pancreas  unchanged appearance. GI/Bowel: Previous gastric surgery.  The bowel loops are not dilated.  No  focal bowel wall thickening.  Moderate stool burden seen throughout the colon. Pelvis: No mass lesions.  No abnormal fluid collections.  No bladder or  ureteral stones. Peritoneum/Retroperitoneum: No adenopathy.  No extraluminal gas. Bones/Soft Tissues: No soft tissue hernia.  No acute fracture.  No lytic or  blastic lesion.                      [] Discussed with Radiologist:     [] The following radiograph was interpreted by myself in the absence of a radiologist:     EKG: (All EKG's are interpreted by myself in the absence of a cardiologist)  The Ekg interpreted by me shows  normal sinus rhythm with a rate of 60  Axis is   Normal  QTc is  normal  Intervals and Durations are unremarkable. ST Segments: no acute change  No significant change from prior EKG dated 10-      MDM:  Patient's vital signs are stable. Started on Phenergan IM, IV fluids, IV Protonix, IV morphine. EKG shows no acute findings. BC shows a normal white count of 5.0. Hemoglobin of 11.6. No left shift. CMP normal. Lipase normal. Lactic acid normal. Urinalysis is clear. CT abdomen shows no acute findings but patient still having vomiting. Added more meds, will admit patient for vision changes, needs MRI and intractable nausea and vomiting. Clinical Impression:  1. Abdominal pain, epigastric    2. Intractable nausea and vomiting    3. Vision changes        Disposition Vitals:  [unfilled], [unfilled], [unfilled], [unfilled]    Disposition referral (if applicable):  No follow-up provider specified.     Disposition medications (if applicable):  New Prescriptions    No medications on file         (Please note that portions of this note may have been completed with a voice recognition program. Efforts were made to edit the dictations but occasionally words are mis-transcribed.)    [facility] MD Gideon Rapp MD  01/09/20 1578

## 2022-06-23 ENCOUNTER — APPOINTMENT (OUTPATIENT)
Dept: GENERAL RADIOLOGY | Age: 52
End: 2022-06-23
Payer: COMMERCIAL

## 2022-06-23 ENCOUNTER — APPOINTMENT (OUTPATIENT)
Dept: CT IMAGING | Age: 52
End: 2022-06-23
Payer: COMMERCIAL

## 2022-06-23 ENCOUNTER — OFFICE VISIT (OUTPATIENT)
Dept: FAMILY MEDICINE CLINIC | Age: 52
End: 2022-06-23
Payer: COMMERCIAL

## 2022-06-23 ENCOUNTER — HOSPITAL ENCOUNTER (OUTPATIENT)
Age: 52
Setting detail: SPECIMEN
Discharge: HOME | End: 2022-06-23
Payer: COMMERCIAL

## 2022-06-23 ENCOUNTER — HOSPITAL ENCOUNTER (EMERGENCY)
Age: 52
Discharge: HOME | End: 2022-06-24
Attending: EMERGENCY MEDICINE
Payer: COMMERCIAL

## 2022-06-23 VITALS
WEIGHT: 240 LBS | BODY MASS INDEX: 41.2 KG/M2 | TEMPERATURE: 98.9 F | OXYGEN SATURATION: 96 % | RESPIRATION RATE: 12 BRPM | HEART RATE: 80 BPM

## 2022-06-23 DIAGNOSIS — R11.0 NAUSEA IN ADULT: ICD-10-CM

## 2022-06-23 DIAGNOSIS — R07.9 CHEST PAIN, UNSPECIFIED TYPE: ICD-10-CM

## 2022-06-23 DIAGNOSIS — R10.13 EPIGASTRIC PAIN: Primary | ICD-10-CM

## 2022-06-23 DIAGNOSIS — F41.9 ANXIETY: ICD-10-CM

## 2022-06-23 DIAGNOSIS — L08.9 FINGER INFECTION: ICD-10-CM

## 2022-06-23 DIAGNOSIS — R09.81 NASAL CONGESTION: Primary | ICD-10-CM

## 2022-06-23 LAB
BASOPHILS ABSOLUTE: 0.1 K/CU MM
BASOPHILS RELATIVE PERCENT: 1 % (ref 0–1)
DIFFERENTIAL TYPE: ABNORMAL
EOSINOPHILS ABSOLUTE: 0.2 K/CU MM
EOSINOPHILS RELATIVE PERCENT: 4.9 % (ref 0–3)
HCT VFR BLD CALC: 32.8 % (ref 37–47)
HEMOGLOBIN: 10.5 GM/DL (ref 12.5–16)
IMMATURE NEUTROPHIL %: 0.2 % (ref 0–0.43)
LYMPHOCYTES ABSOLUTE: 1.8 K/CU MM
LYMPHOCYTES RELATIVE PERCENT: 37.4 % (ref 24–44)
MCH RBC QN AUTO: 27.4 PG (ref 27–31)
MCHC RBC AUTO-ENTMCNC: 32 % (ref 32–36)
MCV RBC AUTO: 85.6 FL (ref 78–100)
MONOCYTES ABSOLUTE: 0.5 K/CU MM
MONOCYTES RELATIVE PERCENT: 9.1 % (ref 0–4)
NUCLEATED RBC %: 0 %
PDW BLD-RTO: 13.2 % (ref 11.7–14.9)
PLATELET # BLD: 180 K/CU MM (ref 140–440)
PMV BLD AUTO: 10.5 FL (ref 7.5–11.1)
RBC # BLD: 3.83 M/CU MM (ref 4.2–5.4)
SEGMENTED NEUTROPHILS ABSOLUTE COUNT: 2.3 K/CU MM
SEGMENTED NEUTROPHILS RELATIVE PERCENT: 47.4 % (ref 36–66)
TOTAL IMMATURE NEUTOROPHIL: 0.01 K/CU MM
TOTAL NUCLEATED RBC: 0 K/CU MM
WBC # BLD: 4.9 K/CU MM (ref 4–10.5)

## 2022-06-23 PROCEDURE — U0003 INFECTIOUS AGENT DETECTION BY NUCLEIC ACID (DNA OR RNA); SEVERE ACUTE RESPIRATORY SYNDROME CORONAVIRUS 2 (SARS-COV-2) (CORONAVIRUS DISEASE [COVID-19]), AMPLIFIED PROBE TECHNIQUE, MAKING USE OF HIGH THROUGHPUT TECHNOLOGIES AS DESCRIBED BY CMS-2020-01-R: HCPCS

## 2022-06-23 PROCEDURE — 71045 X-RAY EXAM CHEST 1 VIEW: CPT

## 2022-06-23 PROCEDURE — 80053 COMPREHEN METABOLIC PANEL: CPT

## 2022-06-23 PROCEDURE — 83690 ASSAY OF LIPASE: CPT

## 2022-06-23 PROCEDURE — 93005 ELECTROCARDIOGRAM TRACING: CPT | Performed by: EMERGENCY MEDICINE

## 2022-06-23 PROCEDURE — 3017F COLORECTAL CA SCREEN DOC REV: CPT | Performed by: PHYSICIAN ASSISTANT

## 2022-06-23 PROCEDURE — 99213 OFFICE O/P EST LOW 20 MIN: CPT | Performed by: PHYSICIAN ASSISTANT

## 2022-06-23 PROCEDURE — G8417 CALC BMI ABV UP PARAM F/U: HCPCS | Performed by: PHYSICIAN ASSISTANT

## 2022-06-23 PROCEDURE — 74176 CT ABD & PELVIS W/O CONTRAST: CPT

## 2022-06-23 PROCEDURE — 1036F TOBACCO NON-USER: CPT | Performed by: PHYSICIAN ASSISTANT

## 2022-06-23 PROCEDURE — G8427 DOCREV CUR MEDS BY ELIG CLIN: HCPCS | Performed by: PHYSICIAN ASSISTANT

## 2022-06-23 PROCEDURE — U0005 INFEC AGEN DETEC AMPLI PROBE: HCPCS

## 2022-06-23 PROCEDURE — 70450 CT HEAD/BRAIN W/O DYE: CPT

## 2022-06-23 PROCEDURE — 85025 COMPLETE CBC W/AUTO DIFF WBC: CPT

## 2022-06-23 PROCEDURE — 80177 DRUG SCRN QUAN LEVETIRACETAM: CPT

## 2022-06-23 PROCEDURE — 84484 ASSAY OF TROPONIN QUANT: CPT

## 2022-06-23 PROCEDURE — 99284 EMERGENCY DEPT VISIT MOD MDM: CPT

## 2022-06-23 RX ORDER — MORPHINE SULFATE 4 MG/ML
4 INJECTION, SOLUTION INTRAMUSCULAR; INTRAVENOUS ONCE
Status: COMPLETED | OUTPATIENT
Start: 2022-06-23 | End: 2022-06-24

## 2022-06-23 RX ORDER — 0.9 % SODIUM CHLORIDE 0.9 %
1000 INTRAVENOUS SOLUTION INTRAVENOUS ONCE
Status: COMPLETED | OUTPATIENT
Start: 2022-06-23 | End: 2022-06-24

## 2022-06-23 RX ORDER — ONDANSETRON 2 MG/ML
4 INJECTION INTRAMUSCULAR; INTRAVENOUS ONCE
Status: COMPLETED | OUTPATIENT
Start: 2022-06-23 | End: 2022-06-24

## 2022-06-23 RX ORDER — HYDROXYZINE PAMOATE 50 MG/1
50 CAPSULE ORAL 2 TIMES DAILY
Qty: 30 CAPSULE | Refills: 2 | Status: SHIPPED | OUTPATIENT
Start: 2022-06-23 | End: 2022-10-25 | Stop reason: SDUPTHER

## 2022-06-23 RX ORDER — CEPHALEXIN 500 MG/1
500 CAPSULE ORAL 4 TIMES DAILY
Qty: 40 CAPSULE | Refills: 0 | Status: SHIPPED | OUTPATIENT
Start: 2022-06-23 | End: 2022-07-03

## 2022-06-23 ASSESSMENT — ENCOUNTER SYMPTOMS
DIARRHEA: 0
SHORTNESS OF BREATH: 0
COUGH: 0
CONSTIPATION: 0
VOMITING: 1
WHEEZING: 0
ABDOMINAL PAIN: 1
SORE THROAT: 0
NAUSEA: 1
SINUS PRESSURE: 0
RHINORRHEA: 0

## 2022-06-23 ASSESSMENT — PAIN SCALES - GENERAL: PAINLEVEL_OUTOF10: 8

## 2022-06-23 ASSESSMENT — PAIN DESCRIPTION - LOCATION: LOCATION: CHEST;HEAD;ABDOMEN

## 2022-06-23 ASSESSMENT — PAIN DESCRIPTION - PAIN TYPE: TYPE: ACUTE PAIN

## 2022-06-23 NOTE — PROGRESS NOTES
6/23/2022    Andre Pope    Chief Complaint   Patient presents with    Nasal Congestion       HPI  History was obtained from patient. Layman Herve is a 46 y.o. female who presents today with multiple concerns. 1-she states that she has had nasal congestion and intermittent generalized headaches for 1 to 2 days. She states that she blows her nose, and then the congestion and headache resolves for several hours. She denies chest pain, shortness of breath, wheezing, fever or chills. 2-she states that she cut her right middle finger 2-1/2 weeks ago on a piece of glass. She has no concerns for foreign body. She states that the area is swollen and tender. There has been no drainage. She has been cleaning it with hydrogen peroxide and applying Neosporin. She states she is up-to-date on Tdap. No note of this within EMR. 3-she is requesting a refill of Phenergan. She states that she has been on this medication for many years. It appears that she requested a refill of this medication just last month and it was denied by her PCP stating that he was on the original prescriber of this medication. I did discuss this with patient. REVIEW OF SYMPTOMS    Constitutional:  Denies fever, chills  Eyes:  Denies vision changes, photophobia or discharge  ENT: Admits nasal congestion. Denies sore throat or ear pain  Cardiovascular:  Denies chest pain, palpitation  Respiratory:  Denies cough or shortness of breath  GI:  Denies abdominal pain, nausea, vomiting, or diarrhea  Musculoskeletal: Admits right middle finger pain. See HPI. Skin: Admits recent laceration to right middle finger. See HPI. There has been no drainage. Neurologic: Admits intermittent generalized headaches. See HPI.     PAST MEDICAL HISTORY  Past Medical History:   Diagnosis Date    Acid reflux     Anemia     Anxiety     Arthritis     Hands, Back And Ankles    Bleeding ulcer 2014    \"I Had Ulcers In My Stomach And Colon\"/ per pt on 8/12/2019\"they said recently having some blood in my stomach- in July ( 2019)could not find where coming from \"    Bronchitis Last Episode 2014    Chronic back pain     Chronic pain     Sees Dr. Lauren Carrington COPD (chronic obstructive pulmonary disease) ([address]     Sees Dr. Korey Salcedo Depression     Fibromyalgia Dx 2013    GERD (gastroesophageal reflux disease)     H/O echocardiogram 08/11/2015    EF >55%. LA to be at the upper limit of normal in size. LV hypertrophy with normal LV systolic, but abnormal diastolic function. Normal valvular structures and function.  H/O echocardiogram 08/30/2018    EF 55-60%    Hiatal hernia     History of blood transfusion 09/2015 And 2018    No Reaction To Blood Transfusions Received    History of sleeve gastrectomy     [place] (hard of hearing)     Right Ear    Hx of cardiac catheterization 10/24/2010    Angiographically normal coronary arteries w/ normal LV function and wall motion.  Hx of transesophageal echocardiography (PILO) for monitoring 12/02/2010    EF 55-60%. WNL.    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 name   Matt Span cysts     \"they found that I have a kidney cyst but not sure which side\" per pt on 7/15/2020    Lupus ([address] Dx 2013    \"Rheumatoid Lupus\"    MDRO (multiple drug resistant organisms) resistance     4 months ago chest from mediport    Morbid obesity ([address]     MRSA bacteremia     Nausea     \"Most Of The Time\"    Osteomyelitis of fifth toe of left foot ([address]     Pain management     Sees Dr. Piotr Gonsales, Once A Month    Pancreatitis chronic Dx 2001    Pneumonia Dx 11-15    Seizures ([address]     Shortness of breath on exertion     Shortness of breath on exertion     Sleep apnea     Has CPAP\"no longer use the cpap since lost weight\"    Staph infection Dx 11-15    Left Foot    Staph infection Dx 11-15    \"Left Foot\"    Teeth missing     Upper And Lower    Thyroid disease     Wears glasses     To Read       FAMILY HISTORY  Family History   Problem Relation Age of Onset    Diabetes Mother         \"Borderline Diabetes\"    High Blood Pressure Mother     Obesity Mother     Arthritis Mother     Heart Disease Mother     High Cholesterol Mother     Vision Loss Mother     Diabetes Father     High Blood Pressure Father     Asthma Father     Cancer Father         prostate cancer    High Blood Pressure Brother     Asthma Son     Vision Loss Son     Lupus Daughter     Other Daughter         \"Alot Of Female Problems\"       SOCIAL HISTORY  Social History     Socioeconomic History    Marital status:      Spouse name: None    Number of children: None    Years of education: None    Highest education level: None   Occupational History    None   Tobacco Use    Smoking status: Never Smoker    Smokeless tobacco: Never Used   Vaping Use    Vaping Use: Never used   Substance and Sexual Activity    Alcohol use: No     Alcohol/week: 0.0 standard drinks    Drug use: No    Sexual activity: Not Currently   Other Topics Concern    None   Social History Narrative    None     Social Determinants of Health     Financial Resource Strain: Low Risk     Difficulty of Paying Living Expenses: Not hard at all   Food Insecurity: No Food Insecurity    Worried About Running Out of Food in the Last Year: Never true    Tyrese of Food in the Last Year: Never true   Transportation Needs:     Lack of Transportation (Medical): Not on file    Lack of Transportation (Non-Medical):  Not on file   Physical Activity:     Days of Exercise per Week: Not on file    Minutes of Exercise per Session: Not on file   Stress:     Feeling of Stress : Not on file   Social Connections:     Frequency of Communication with Friends and Family: Not on file    Frequency of Social Gatherings with Friends and Family: Not on file    Attends Jehovah's witness Services: Not on file    Active Member of Clubs or Organizations: Not on file    Attends Club or Organization Meetings: Not on file    Marital Status: Not on file   Intimate Partner Violence:     Fear of Current or Ex-Partner: Not on file    Emotionally Abused: Not on file    Physically Abused: Not on file    Sexually Abused: Not on file   Housing Stability:     Unable to Pay for Housing in the Last Year: Not on file    Number of [place] in the Last Year: Not on file    Unstable Housing in the Last Year: Not on file        SURGICAL HISTORY  Past Surgical History:   Procedure Laterality Date    APPENDECTOMY  02/1998    Done When Tubes And Ovaries Were Removed    CARDIAC CATHETERIZATION  10/24/2010    CATHETER REMOVAL N/A 7/16/2019    PORT REMOVAL performed by Scar Acevedo MD at [facility]  08/27/1991    CHOLECYSTECTOMY, LAPAROSCOPIC  1990's    COLONOSCOPY  Last Done In 2000's    DENTAL SURGERY      Teeth Extracted In Past    ENDOSCOPY, COLON, DIAGNOSTIC  Last Done In 2018   [facility] Left 6/16/2019    FIRST METATARSAL DEBRIDEMENT INCISION AND DRAINAGE. EXCISION OF ULCER.  RESECTION OF BONE. 1ST METATARSAL POWER LAVAGE AND BONE CEMENT performed by Kiana Mae DPM at [address] [facility] Left 4/15/2022    LEFT FOOT ABSCESS DEBRIDEMENT INCISION AND DRAINAGE, CYST REMOVAL performed by Kiana Mae DPM at [address] [facility] Left Last Done In 2016     Dr. Wilda Curran, \" About 6 Surgeries Done Because Of Staph Infection\"    HIATAL HERNIA REPAIR N/A 2/12/2019    HERNIA HIATAL LAPAROSCOPIC ROBOTIC performed by Scar Acevedo MD at [address] ([facility]  10/1997    Partial Abdominal Hysterectomy    INSERTION / REMOVAL / REPLACEMENT VENOUS ACCESS CATHETER N/A 8/15/2019    PORT INSERTION performed by Nora Dyson MD at [address] [facility]    removed after 6 months    LEG BIOPSY EXCISION Left 3/8/2021    LEFT THIGH LESION BIOPSY EXCISION performed by Nora Dyson MD at [facility], PARTIAL Left 2008    Benign    OTHER SURGICAL HISTORY  02/1998    \"Tubes And Ovaries Removed, Appendectomy Also Done\"    OTHER SURGICAL HISTORY  Last Done 7-15-16    Mediport Insertion \"Total Of Six Done, Removed Last Mediport In 2014\"    PORT SURGERY N/A 1/15/2020    PORT REMOVAL performed by Nora Dyson MD at [address] [place] [address] N/A 7/6/2020    PORT INSERTION performed by Nora Dyson MD at [address] [place] [address] Left 8/3/2021    MEDIPORT REPLACEMENT performed by Nora Dyson MD at [address] N/A 8/16/2021    GASTRIC BYPASS RUFINO-EN-Y LAPAROSCOPIC ROBOTIC, LYSIS OF ADHESIONS performed by Nora Dyson MD at [address] [facility] N/A 2/12/2019    GASTRECTOMY SLEEVE LAPAROSCOPIC ROBOTIC performed by Nora Dyson MD at [address] [place] [address]  08/27/2018    UPPER GASTROINTESTINAL ENDOSCOPY N/A 4/2/2019    EGD CONTROL HEMORRHAGE with epi injection at bleeding site performed by Nora Dyson MD at [facility] 6/19/2019    EGD DIAGNOSTIC ONLY performed by Nora Dyson MD at [facility] N/ 7/22/2019    EGD DIAGNOSTIC ONLY performed by Bialee Rankin MD at [facility] 10/14/2019    EGD DIAGNOSTIC ONLY performed by Nora Dyson MD at [facility] N/ 7/17/2020    EGJ DILATATION BALLOON WITH 18-20 MM BALLOON, DILATED TO 20 MM. performed by Nora Dyson MD at [facility] 5/28/2021    EGD BIOPSY performed by Tamiko Powell MD at [address] [place] [address]  Current Outpatient Medications   Medication Sig Dispense Refill    cephALEXin (KEFLEX) 500 MG capsule Take 1 capsule by mouth 4 times daily for 10 days 40 capsule 0    estradiol (ESTRACE) 0.5 MG tablet Take 1 tablet by mouth daily 30 tablet 2    levothyroxine (SYNTHROID) 125 MCG tablet Take 1 tablet by mouth Daily 30 tablet 2    levETIRAcetam (KEPPRA) 1000 MG tablet Take 1 tablet by mouth twice daily 60 tablet 3    pantoprazole (PROTONIX) 40 MG tablet Take 1 tablet by mouth 2 times daily 60 tablet 2    hydrOXYzine (VISTARIL) 50 MG capsule Take 1 capsule by mouth 2 times daily 30 capsule 2    PARoxetine (PAXIL) 30 MG tablet Take 1.5 tablets by mouth nightly 45 tablet 3    benzocaine (ORAJEL) 10 % mucosal gel Apply to the ulcer 2-3 times a day 1 each 0    albuterol (PROVENTIL) (2.5 MG/3ML) 0.083% nebulizer solution Take 3 mLs by nebulization every 6 hours as needed for Wheezing 120 each 0    albuterol sulfate  (90 Base) MCG/ACT inhaler Inhale 2 puffs into the lungs 4 times daily 1 each 0    oxyCODONE-acetaminophen (PERCOCET) 5-325 MG per tablet TAKE 1 TABLET BY MOUTH EVERY 6 HOURS AS NEEDED FOR 28 DAYS      NARCAN 4 MG/0.1ML LIQD nasal spray USE AS DIRECTED AS NEEDED FOR SUSPECTED OPIOID OVERDOSE      ondansetron (ZOFRAN ODT) 4 MG disintegrating tablet Take 1 tablet by mouth every 4 hours as needed for Nausea or Vomiting 30 tablet 3    scopolamine (TRANSDERM-SCOP) transdermal patch APPLY 1 PATCH TRANSDERMALLY TO THE SKIN BEHIND THE EAR ONCE EVERY 3 DAYS AS NEEDED 10 patch 0    Melatonin 10 MG TABS Take 10-20 mg by mouth nightly as needed      Cyanocobalamin (VITAMIN B 12 PO) Take 1 tablet by mouth daily      meclizine (ANTIVERT) 25 MG tablet Take 25 mg by mouth 3 times daily as needed for Dizziness      betamethasone valerate (VALISONE) 0.1 % ointment APPLY OINTMENT TO AFFECTED AREA TWICE DAILY TO HANDS AND ELBOWS FOR 21 DAYS      potassium gluconate 550 mg tablet Take 1 tablet by mouth daily      tiZANidine (ZANAFLEX) 4 MG tablet Take 4 mg by mouth every 8 hours as needed       ferrous sulfate (IRON 325) 325 (65 Fe) MG tablet Take 1 tablet by mouth daily (with breakfast) 90 tablet 5    Cholecalciferol (VITAMIN D3) 5000 units TABS Take 1 tablet by mouth daily      Multiple Vitamin (MVI, BARIATRIC ADVANTAGE MULTI-FORMULA, CHEW TAB) Take 1 tablet by mouth daily 30 tablet 5    Calcium Citrate-Vitamin D (CALCIUM + VIT D, BARIATRIC ADVANTAGE, CHEWABLE TABLET) Take 1 tablet by mouth 2 times daily 120 tablet 5    gabapentin (NEURONTIN) 800 MG tablet Take 800 mg by mouth 3 times daily       No current facility-administered medications for this visit. ALLERGIES  Allergies   Allergen Reactions    Aspirin Palpitations     \"My Heart Rate Elevates\"    Compazine [Prochlorperazine] Rash    Shellfish-Derived Products Swelling    Toradol [Ketorolac Tromethamine] Rash    Haldol [Haloperidol]      Shaking      Reglan [Metoclopramide] Itching       PHYSICAL EXAM    Pulse 80   Temp 98.9 °F (37.2 °C)   Resp 12   Wt 240 lb (108.9 kg)   SpO2 96%   BMI 41.20 kg/m²     Constitutional:  Well developed, well nourished  HENT:  Normocephalic, atraumatic, bilateral external ears normal, bilateral ear canals and TMs normal.  Oropharynx moist.  No petechiae or exudate. Uvula midline. Airway benign. No obvious nasal congestion. Eyes: conjunctiva normal, no discharge, no scleral icterus  Neck:  No tenderness, supple  Lymphatic:  No lymphadenopathy noted  Cardiovascular:  Normal heart rate, normal rhythm, no murmurs, gallops or rubs  Thorax & Lungs:  Normal breath sounds, no respiratory distress, no wheezing, no rales, no rhonchi  Skin: Scab noted on posterior aspect of right finger between the IP joints. There is very minimal surrounding erythema. There is tenderness to palpation. No palpable foreign body.   Brisk cap refill. Good sensation. Neurologic:  Alert & oriented  Psychiatric:  Affect normal, mood normal    ASSESSMENT & PLAN    Andre was seen today for nasal congestion. Diagnoses and all orders for this visit:    Nasal congestion  -     COVID-19        Finger infection  -     cephALEXin (KEFLEX) 500 MG capsule; Take 1 capsule by mouth 4 times daily for 10 days         Increase fluids and rest  Saline nasal spray as needed for nasal congestion  Warm salt gargles as needed for throat discomfort  Monitor temperature twice a day  Tylenol as needed for fevers and/or discomfort. Big deep breaths periodically throughout the day  Soak the finger in warm Epson salt once daily  Discontinue hydrogen peroxide and Neosporin  Antibiotic as prescribed, take with food  If symptoms worsen -Go to the ER. Follow up with your primary care provider to discuss refill of Phenergan. It is documented that he is not the original prescriber of this medication and that is why the request was not honored. There are no discontinued medications. No follow-ups on file. Patient verbalizes understanding with the above plan and is in agreement. Patient will call with  questions or concerns. I did don appropriate PPE (including N95 face mask, protective safety glasses, face shield, gloves, and gown) as recommended by the health facility/national standard best practice, during my interaction with the patient. Please note that this chart was generated using dragon dictation software. Although every effort was made to ensure the accuracy of this automated transcription, some errors in transcription may have occurred.     Electronically signed by Brandyn Arechiga PA-C on 6/23/2022

## 2022-06-23 NOTE — TELEPHONE ENCOUNTER
Needs this medication refilled and phenegrin. Didn't see Phinegrin on the list.  [facility] on [address].

## 2022-06-23 NOTE — PATIENT INSTRUCTIONS
Your COVID 19 test can take 1-5 days for the results to come back. We ask that you make a Mychart page and view your test results this way. You will need to Self quarantine until you know your results. If positive, please work on contact tracing. Increase fluids and rest  Saline nasal spray as needed for nasal congestion  Warm salt gargles as needed for throat discomfort  Monitor temperature twice a day  Tylenol as needed for fevers and/or discomfort. Big deep breaths periodically throughout the day  Soak the finger in warm Epson salt once daily  Discontinued hydrogen peroxide and Neosporin  Antibiotic as prescribed, take with food  If symptoms worsen -Go to the ER. Follow up with your primary care provider      To Whom it May Concern:    Jono Prater was tested for COVID-19 6/23/2022. He/she must stay home until test results are back. If test is positive, isolate for a total of 5 days, starting from day 1 of symptom onset. After 5 days, if fever-free for 24 hours and there has been a gradual improvement in symptoms, may come out of isolation, but must consistently wear a mask when around other people for 5 additional days. (5 days isolation, 5 days mask-wearing). We do not recommend retesting as patients may continue to test positive for months even though no longer contagious. It is suggested you call [facility] or [address] with any questions regarding isolation timeframe/return to work/school details.       Houston Martínez PA-C

## 2022-06-23 NOTE — PROGRESS NOTES
6/23/22  Andre Pope  1970    FLU/COVID-19 CLINIC EVALUATION    HPI SYMPTOMS:    Employer:    [] Fevers  [] Chills  [] Cough  [] Coughing up blood  [] Chest Congestion  [x] Nasal Congestion  [] Feeling short of breath  [] Sometimes  [] Frequently  [] All the time  [] Chest pain  [] Headaches  []Tolerable  [] Severe  [] Sore throat  [] Muscle aches  [] Nausea  [] Vomiting  []Unable to keep fluids down  [] Diarrhea  []Severe    [x] OTHER SYMPTOMS:        Symptom Duration:   [x] 1  [] 2   [] 3   [] 4    [] 5   [] 6   [] 7   [] 8   [] 9   [] 10   [] 11   [] 12   [] 13   [] 14   [] Longer than 14 days    Symptom course:   [] Worsening     [x] Stable     [] Improving    RISK FACTORS:    [] Pregnant or possibly pregnant  [] Age over 61  [] Diabetes  [] Heart disease  [] Asthma  [x] COPD/Other chronic lung diseases  [] Active Cancer  [] On Chemotherapy  [] Taking oral steroids  [] History Lymphoma/Leukemia  [] Close contact with a lab confirmed COVID-19 patient within 14 days of symptom onset  [x] History of travel from affected geographical areas within 14 days of symptom onset-[place]       VITALS:  There were no vitals filed for this visit. TESTS:    POCT FLU:  [] Positive     []Negative    ASSESSMENT:    [] Flu  [] Possible COVID-19  [] Strep    PLAN:    [] Discharge home with written instructions for:  [] Flu management  [] Possible COVID-19 infection with self-quarantine and management of symptoms  [] Follow-up with primary care physician or emergency department if worsens  [] Evaluation per physician/NP/PA in clinic  [] Sent to ER       An  electronic signature was used to authenticate this note.      --[place] on 6/23/2022 at 12:50 PM

## 2022-06-24 ENCOUNTER — OFFICE VISIT (OUTPATIENT)
Dept: INTERNAL MEDICINE CLINIC | Age: 52
End: 2022-06-24
Payer: COMMERCIAL

## 2022-06-24 VITALS
DIASTOLIC BLOOD PRESSURE: 69 MMHG | HEART RATE: 66 BPM | OXYGEN SATURATION: 96 % | WEIGHT: 240 LBS | SYSTOLIC BLOOD PRESSURE: 107 MMHG | BODY MASS INDEX: 40.97 KG/M2 | HEIGHT: 64 IN

## 2022-06-24 VITALS
HEIGHT: 64 IN | TEMPERATURE: 97.8 F | DIASTOLIC BLOOD PRESSURE: 79 MMHG | SYSTOLIC BLOOD PRESSURE: 130 MMHG | RESPIRATION RATE: 16 BRPM | HEART RATE: 75 BPM | WEIGHT: 240 LBS | BODY MASS INDEX: 40.97 KG/M2 | OXYGEN SATURATION: 96 %

## 2022-06-24 DIAGNOSIS — F32.A CHRONIC DEPRESSION: ICD-10-CM

## 2022-06-24 DIAGNOSIS — E03.4 HYPOTHYROIDISM DUE TO ACQUIRED ATROPHY OF THYROID: ICD-10-CM

## 2022-06-24 DIAGNOSIS — I10 PRIMARY HYPERTENSION: ICD-10-CM

## 2022-06-24 DIAGNOSIS — K21.9 GASTROESOPHAGEAL REFLUX DISEASE WITHOUT ESOPHAGITIS: ICD-10-CM

## 2022-06-24 DIAGNOSIS — F41.9 ANXIETY: ICD-10-CM

## 2022-06-24 DIAGNOSIS — M79.10 MYALGIA: ICD-10-CM

## 2022-06-24 DIAGNOSIS — G89.4 CHRONIC PAIN SYNDROME: ICD-10-CM

## 2022-06-24 DIAGNOSIS — Z98.84 HISTORY OF ROUX-EN-Y GASTRIC BYPASS: ICD-10-CM

## 2022-06-24 DIAGNOSIS — K76.0 FATTY LIVER DISEASE, NONALCOHOLIC: ICD-10-CM

## 2022-06-24 DIAGNOSIS — D50.0 IRON DEFICIENCY ANEMIA DUE TO CHRONIC BLOOD LOSS: ICD-10-CM

## 2022-06-24 DIAGNOSIS — R56.9 PSEUDOSEIZURES (HCC): Primary | ICD-10-CM

## 2022-06-24 DIAGNOSIS — E55.9 VITAMIN D DEFICIENCY: ICD-10-CM

## 2022-06-24 DIAGNOSIS — J44.9 CHRONIC OBSTRUCTIVE PULMONARY DISEASE, UNSPECIFIED COPD TYPE (HCC): ICD-10-CM

## 2022-06-24 DIAGNOSIS — E66.01 MORBID OBESITY WITH BMI OF 40.0-44.9, ADULT (HCC): ICD-10-CM

## 2022-06-24 PROBLEM — F44.5 PSEUDOSEIZURE: Status: RESOLVED | Noted: 2019-03-31 | Resolved: 2022-06-24

## 2022-06-24 LAB
ALBUMIN SERPL-MCNC: 4 GM/DL (ref 3.4–5)
ALP BLD-CCNC: 138 IU/L (ref 40–129)
ALT SERPL-CCNC: 15 U/L (ref 10–40)
ANION GAP SERPL CALCULATED.3IONS-SCNC: 11 MMOL/L (ref 4–16)
AST SERPL-CCNC: 17 IU/L (ref 15–37)
BACTERIA: NEGATIVE /HPF
BILIRUB SERPL-MCNC: 0.3 MG/DL (ref 0–1)
BILIRUBIN URINE: NEGATIVE MG/DL
BLOOD, URINE: NEGATIVE
BUN BLDV-MCNC: 22 MG/DL (ref 6–23)
CALCIUM SERPL-MCNC: 9.7 MG/DL (ref 8.3–10.6)
CHLORIDE BLD-SCNC: 102 MMOL/L (ref 99–110)
CLARITY: CLEAR
CO2: 24 MMOL/L (ref 21–32)
COLOR: YELLOW
CREAT SERPL-MCNC: 0.7 MG/DL (ref 0.6–1.1)
EKG ATRIAL RATE: 82 BPM
EKG DIAGNOSIS: NORMAL
EKG P AXIS: 45 DEGREES
EKG P-R INTERVAL: 176 MS
EKG Q-T INTERVAL: 378 MS
EKG QRS DURATION: 88 MS
EKG QTC CALCULATION (BAZETT): 441 MS
EKG R AXIS: -22 DEGREES
EKG T AXIS: 49 DEGREES
EKG VENTRICULAR RATE: 82 BPM
GFR AFRICAN AMERICAN: >60 ML/MIN/1.73M2
GFR NON-AFRICAN AMERICAN: >60 ML/MIN/1.73M2
GLUCOSE BLD-MCNC: 97 MG/DL (ref 70–99)
GLUCOSE, URINE: NEGATIVE MG/DL
KETONES, URINE: NEGATIVE MG/DL
LEUKOCYTE ESTERASE, URINE: ABNORMAL
LIPASE: 18 IU/L (ref 13–60)
NITRITE URINE, QUANTITATIVE: NEGATIVE
PH, URINE: 6 (ref 5–8)
POTASSIUM SERPL-SCNC: 3.9 MMOL/L (ref 3.5–5.1)
PROTEIN UA: NEGATIVE MG/DL
RBC URINE: 2 /HPF (ref 0–6)
SARS-COV-2: NOT DETECTED
SODIUM BLD-SCNC: 137 MMOL/L (ref 135–145)
SOURCE: NORMAL
SPECIFIC GRAVITY UA: 1.02 (ref 1–1.03)
SQUAMOUS EPITHELIAL: 4 /HPF
TOTAL PROTEIN: 6.5 GM/DL (ref 6.4–8.2)
TRICHOMONAS: ABNORMAL /HPF
TROPONIN T: <0.01 NG/ML
UROBILINOGEN, URINE: 0.2 MG/DL (ref 0.2–1)
WBC UA: 5 /HPF (ref 0–5)

## 2022-06-24 PROCEDURE — 96375 TX/PRO/DX INJ NEW DRUG ADDON: CPT

## 2022-06-24 PROCEDURE — 93010 ELECTROCARDIOGRAM REPORT: CPT | Performed by: INTERNAL MEDICINE

## 2022-06-24 PROCEDURE — 3023F SPIROM DOC REV: CPT | Performed by: INTERNAL MEDICINE

## 2022-06-24 PROCEDURE — G8427 DOCREV CUR MEDS BY ELIG CLIN: HCPCS | Performed by: INTERNAL MEDICINE

## 2022-06-24 PROCEDURE — G8417 CALC BMI ABV UP PARAM F/U: HCPCS | Performed by: INTERNAL MEDICINE

## 2022-06-24 PROCEDURE — 99214 OFFICE O/P EST MOD 30 MIN: CPT | Performed by: INTERNAL MEDICINE

## 2022-06-24 PROCEDURE — 3017F COLORECTAL CA SCREEN DOC REV: CPT | Performed by: INTERNAL MEDICINE

## 2022-06-24 PROCEDURE — 96374 THER/PROPH/DIAG INJ IV PUSH: CPT

## 2022-06-24 PROCEDURE — 96376 TX/PRO/DX INJ SAME DRUG ADON: CPT

## 2022-06-24 PROCEDURE — 6360000002 HC RX W HCPCS: Performed by: EMERGENCY MEDICINE

## 2022-06-24 PROCEDURE — 81001 URINALYSIS AUTO W/SCOPE: CPT

## 2022-06-24 PROCEDURE — 2580000003 HC RX 258: Performed by: EMERGENCY MEDICINE

## 2022-06-24 PROCEDURE — 1036F TOBACCO NON-USER: CPT | Performed by: INTERNAL MEDICINE

## 2022-06-24 RX ORDER — HEPARIN SODIUM (PORCINE) LOCK FLUSH IV SOLN 100 UNIT/ML 100 UNIT/ML
100 SOLUTION INTRAVENOUS ONCE
Status: COMPLETED | OUTPATIENT
Start: 2022-06-24 | End: 2022-06-24

## 2022-06-24 RX ORDER — MORPHINE SULFATE 4 MG/ML
4 INJECTION, SOLUTION INTRAMUSCULAR; INTRAVENOUS ONCE
Status: COMPLETED | OUTPATIENT
Start: 2022-06-24 | End: 2022-06-24

## 2022-06-24 RX ORDER — ONDANSETRON 4 MG/1
4 TABLET, ORALLY DISINTEGRATING ORAL EVERY 8 HOURS PRN
Qty: 6 TABLET | Refills: 0 | Status: SHIPPED | OUTPATIENT
Start: 2022-06-24 | End: 2022-06-24 | Stop reason: SDUPTHER

## 2022-06-24 RX ORDER — PROMETHAZINE HYDROCHLORIDE 25 MG/1
25 TABLET ORAL EVERY 8 HOURS PRN
Qty: 15 TABLET | Refills: 0 | OUTPATIENT
Start: 2022-06-24

## 2022-06-24 RX ORDER — ONDANSETRON 4 MG/1
4 TABLET, ORALLY DISINTEGRATING ORAL EVERY 8 HOURS PRN
Qty: 20 TABLET | Refills: 0 | Status: SHIPPED | OUTPATIENT
Start: 2022-06-24 | End: 2022-09-12

## 2022-06-24 RX ORDER — ALBUTEROL SULFATE 90 UG/1
2 AEROSOL, METERED RESPIRATORY (INHALATION) 4 TIMES DAILY
Qty: 1 EACH | Refills: 3 | Status: SHIPPED | OUTPATIENT
Start: 2022-06-24

## 2022-06-24 RX ADMIN — MORPHINE SULFATE 4 MG: 4 INJECTION, SOLUTION INTRAMUSCULAR; INTRAVENOUS at 02:46

## 2022-06-24 RX ADMIN — Medication 100 UNITS: at 03:48

## 2022-06-24 RX ADMIN — ONDANSETRON 4 MG: 2 INJECTION INTRAMUSCULAR; INTRAVENOUS at 00:05

## 2022-06-24 RX ADMIN — MORPHINE SULFATE 4 MG: 4 INJECTION, SOLUTION INTRAMUSCULAR; INTRAVENOUS at 00:04

## 2022-06-24 RX ADMIN — SODIUM CHLORIDE 1000 ML: 9 INJECTION, SOLUTION INTRAVENOUS at 01:11

## 2022-06-24 ASSESSMENT — PATIENT HEALTH QUESTIONNAIRE - PHQ9
2. FEELING DOWN, DEPRESSED OR HOPELESS: 0
SUM OF ALL RESPONSES TO PHQ QUESTIONS 1-9: 0
SUM OF ALL RESPONSES TO PHQ9 QUESTIONS 1 & 2: 0
SUM OF ALL RESPONSES TO PHQ QUESTIONS 1-9: 0
9. THOUGHTS THAT YOU WOULD BE BETTER OFF DEAD, OR OF HURTING YOURSELF: 0
3. TROUBLE FALLING OR STAYING ASLEEP: 0
10. IF YOU CHECKED OFF ANY PROBLEMS, HOW DIFFICULT HAVE THESE PROBLEMS MADE IT FOR YOU TO DO YOUR WORK, TAKE CARE OF THINGS AT HOME, OR GET ALONG WITH OTHER PEOPLE: 0
7. TROUBLE CONCENTRATING ON THINGS, SUCH AS READING THE NEWSPAPER OR WATCHING TELEVISION: 0
SUM OF ALL RESPONSES TO PHQ QUESTIONS 1-9: 0
1. LITTLE INTEREST OR PLEASURE IN DOING THINGS: 0
8. MOVING OR SPEAKING SO SLOWLY THAT OTHER PEOPLE COULD HAVE NOTICED. OR THE OPPOSITE, BEING SO FIGETY OR RESTLESS THAT YOU HAVE BEEN MOVING AROUND A LOT MORE THAN USUAL: 0
6. FEELING BAD ABOUT YOURSELF - OR THAT YOU ARE A FAILURE OR HAVE LET YOURSELF OR YOUR FAMILY DOWN: 0
4. FEELING TIRED OR HAVING LITTLE ENERGY: 0
5. POOR APPETITE OR OVEREATING: 0
SUM OF ALL RESPONSES TO PHQ QUESTIONS 1-9: 0

## 2022-06-24 ASSESSMENT — PAIN SCALES - GENERAL: PAINLEVEL_OUTOF10: 5

## 2022-06-24 ASSESSMENT — PAIN - FUNCTIONAL ASSESSMENT: PAIN_FUNCTIONAL_ASSESSMENT: 0-10

## 2022-06-24 NOTE — ED PROVIDER NOTES
Emergency Department Encounter    Patient: Rose Mary Fields  MRN: [id number]  : 1970  Date of Evaluation: 2022  ED Provider:  Antonette Berry DO    Triage Chief Complaint:   Seizures (around 4pm), Headache, Chest Pain, and Abdominal Pain    HPI:  Rose Mary Fields is a 46 y.o. female with past medical history significant for hypertension, hyperlipidemia and seizures who presents with multiple chief complaints. Patient admits to 1 week of intermittent headache, she states this is a typical headache for her, and this headache is been present since she has had more frequent seizures over the last 2 months. She did have a seizure this afternoon at approximately 4 PM, this was a typical seizure for her. Patient is recently admitted to the hospital for more frequent seizures, did have increase in her Keppra dosing. She has been taking her Keppra. However she states that today she has had multiple episodes of nonbloody, nonbilious emesis, and she is unsure if she is kept her Keppra down. She does also admit to epigastric abdominal pain that feels like a burning sensation and radiates into her mid chest, and wraps around to her right flank. She does have a history of chronic pancreatitis, and this does feel similar to episode she has had in the past.  Patient denies any fever, chills, shortness of breath, palpitations, dysuria. Of note, patient was recently seen by her primary care provider today with complaint of nasal congestion, headache, cut to her right middle finger, and request for refill of Phenergan. She was diagnosed with possible COVID-19 infection, as well as a finger infection, and prescribed Keflex 4 times daily for 10 days for this. ROS:  Review of Systems   Constitutional: Negative for chills and fever. HENT: Negative for congestion, rhinorrhea, sinus pressure and sore throat. Eyes: Negative for visual disturbance.    Respiratory: Negative for cough, shortness of breath and wheezing. Cardiovascular: Positive for chest pain. Negative for palpitations. Gastrointestinal: Positive for abdominal pain, nausea and vomiting. Negative for constipation and diarrhea. Genitourinary: Negative for dysuria, frequency and urgency. Musculoskeletal: Negative for arthralgias and myalgias. Skin: Negative for rash. Neurological: Positive for headaches. Negative for dizziness and syncope. Psychiatric/Behavioral: Negative for agitation, behavioral problems and confusion. Past Medical History:   Diagnosis Date    Acid reflux     Anemia     Anxiety     Arthritis     Hands, Back And Ankles    Bleeding ulcer 2014    \"I Had Ulcers In My Stomach And Colon\"/ per pt on 8/12/2019\"they said recently having some blood in my stomach- in July ( 2019)could not find where coming from \"    Bronchitis Last Episode 2014    Chronic back pain     Chronic pain     Sees Dr. Dali Reyes COPD (chronic obstructive pulmonary disease) ([facility] [address] [facility]     Sees Dr. Jose Melgar Depression     Fibromyalgia Dx 2013    GERD (gastroesophageal reflux disease)     H/O echocardiogram 08/11/2015    EF >55%. LA to be at the upper limit of normal in size. LV hypertrophy with normal LV systolic, but abnormal diastolic function. Normal valvular structures and function.  H/O echocardiogram 08/30/2018    EF 55-60%    Hiatal hernia     History of blood transfusion 09/2015 And 2018    No Reaction To Blood Transfusions Received    History of sleeve gastrectomy     [place] (hard of hearing)     Right Ear    Hx of cardiac catheterization 10/24/2010    Angiographically normal coronary arteries w/ normal LV function and wall motion.  Hx of transesophageal echocardiography (PILO) for monitoring 12/02/2010    EF 55-60%. WNL.    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 name   Leta Santos cysts     Juany Flores found that I have a kidney cyst but not sure which side\" per pt on 7/15/2020    Lupus ([address] Dx 2013    \"Rheumatoid Lupus\"    MDRO (multiple drug resistant organisms) resistance     4 months ago chest from mediport    Morbid obesity ([address]     MRSA bacteremia     Nausea     \"Most Of The Time\"    Osteomyelitis of fifth toe of left foot ([address]     Pain management     Sees Dr. Brown Fearing, Once A Month    Pancreatitis chronic Dx 2001    Pneumonia Dx 11-15    Seizures ([address]     Shortness of breath on exertion     Shortness of breath on exertion     Sleep apnea     Has CPAP\"no longer use the cpap since lost weight\"    Staph infection Dx 11-15    Left Foot    Staph infection Dx 11-15    \"Left Foot\"    Teeth missing     Upper And Lower    Thyroid disease     Wears glasses     To Read     Past Surgical History:   Procedure Laterality Date    APPENDECTOMY  02/1998    Done When Tubes And Ovaries Were Removed    CARDIAC CATHETERIZATION  10/24/2010    CATHETER REMOVAL N/A 7/16/2019    PORT REMOVAL performed by Derick Merida MD at [facility]  08/27/1991    CHOLECYSTECTOMY, LAPAROSCOPIC  1990's    COLONOSCOPY  Last Done In 2000's    DENTAL SURGERY      Teeth Extracted In Past    ENDOSCOPY, COLON, DIAGNOSTIC  Last Done In 2018    FOOT DEBRIDEMENT Left 6/16/2019    FIRST METATARSAL DEBRIDEMENT INCISION AND DRAINAGE. EXCISION OF ULCER.  RESECTION OF BONE. 1ST METATARSAL POWER LAVAGE AND BONE CEMENT performed by Ijeoma Mar DPM at [facility] Left 4/15/2022    LEFT FOOT ABSCESS DEBRIDEMENT INCISION AND DRAINAGE, CYST REMOVAL performed by Ijeoma Mar DPM at [address] Left Last Done In 2016     Dr. Espinoza Santacruz, \" About 6 Surgeries Done Because Of Staph Infection\"  HIATAL HERNIA REPAIR N/A 2/12/2019    HERNIA HIATAL LAPAROSCOPIC ROBOTIC performed by Cassie Freeman MD at [facility] ([facility])  10/1997    Partial Abdominal Hysterectomy    INSERTION / REMOVAL / REPLACEMENT VENOUS ACCESS CATHETER N/A 8/15/2019    PORT INSERTION performed by Cassie Freeman MD at [address] [facility]    removed after 6 months    LEG BIOPSY EXCISION Left 3/8/2021    LEFT THIGH LESION BIOPSY EXCISION performed by Cassie Freeman MD at [facility], PARTIAL Left 2008    Benign    OTHER SURGICAL HISTORY  02/1998    \"Tubes And Ovaries Removed, Appendectomy Also Done\"    OTHER SURGICAL HISTORY  Last Done 7-15-16    Mediport Insertion \"Total Of Six Done, Removed Last Mediport In 2014\"    PORT SURGERY N/A 1/15/2020    PORT REMOVAL performed by Cassie Freeman MD at [facility] N/A 7/6/2020    PORT INSERTION performed by Cassie Freeman MD at [address] [place] [address] Left 8/3/2021    MEDIPORT REPLACEMENT performed by Cassie Freeman MD at [address] [place] [address] N/A 8/16/2021    GASTRIC BYPASS RUFINO-EN-Y LAPAROSCOPIC ROBOTIC, LYSIS OF ADHESIONS performed by Cassie Freeman MD at [address] [facility] N/A 2/12/2019    GASTRECTOMY SLEEVE LAPAROSCOPIC ROBOTIC performed by Cassie Freeman MD at [address] [place] [address]  08/27/2018    UPPER GASTROINTESTINAL ENDOSCOPY N/A 4/2/2019    EGD CONTROL HEMORRHAGE with epi injection at bleeding site performed by Cassie Freeman MD at [address] [place] [address] 6/19/2019    EGD DIAGNOSTIC ONLY performed by Cassie Freeman MD at [address] [place] [address] N/A 7/22/2019    EGD DIAGNOSTIC ONLY performed by Mariela Osman MD at [address] 10/14/2019    EGD DIAGNOSTIC ONLY performed by Cassie Freeman MD at [facility] ENDOSCOPY    UPPER GASTROINTESTINAL ENDOSCOPY N/A 7/17/2020    EGJ DILATATION BALLOON WITH 18-20 MM BALLOON, DILATED TO 21 MM. performed by Nahum Corcoran MD at [address] N/A 5/28/2021    EGD BIOPSY performed by Nahum Corcoran MD at [facility] ENDOSCOPY     Family History   Problem Relation Age of Onset    Diabetes Mother         \"Borderline Diabetes\"    High Blood Pressure Mother     Obesity Mother     Arthritis Mother     Heart Disease Mother     High Cholesterol Mother     Vision Loss Mother     Diabetes Father     High Blood Pressure Father     Asthma Father     Cancer Father         prostate cancer    High Blood Pressure Brother     Asthma Son     Vision Loss Son     Lupus Daughter     Other Daughter         \"Alot Of Female Problems\"     Social History     Socioeconomic History    Marital status:      Spouse name: Not on file    Number of children: Not on file    Years of education: Not on file    Highest education level: Not on file   Occupational History    Not on file   Tobacco Use    Smoking status: Never Smoker    Smokeless tobacco: Never Used   Vaping Use    Vaping Use: Never used   Substance and Sexual Activity    Alcohol use: No     Alcohol/week: 0.0 standard drinks    Drug use: No    Sexual activity: Not Currently   Other Topics Concern    Not on file   Social History Narrative    Not on file     Social Determinants of Health     Financial Resource Strain: Low Risk     Difficulty of Paying Living Expenses: Not hard at all   Food Insecurity: No Food Insecurity    Worried About Running Out of Food in the Last Year: Never true    Tyrese of Food in the Last Year: Never true   Transportation Needs:     Lack of Transportation (Medical): Not on file    Lack of Transportation (Non-Medical):  Not on file   Physical Activity:     Days of Exercise per Week: Not on file    Minutes of Exercise per Session: Not on file Stress:     Feeling of Stress : Not on file   Social Connections:     Frequency of Communication with Friends and Family: Not on file    Frequency of Social Gatherings with Friends and Family: Not on file    Attends Denominational Services: Not on file    Active Member of Clubs or Organizations: Not on file    Attends Club or Organization Meetings: Not on file    Marital Status: Not on file   Intimate Partner Violence:     Fear of Current or Ex-Partner: Not on file    Emotionally Abused: Not on file    Physically Abused: Not on file    Sexually Abused: Not on file   Housing Stability:     Unable to Pay for Housing in the Last Year: Not on file    Number of [place] in the Last Year: Not on file    Unstable Housing in the Last Year: Not on file     No current facility-administered medications for this encounter.      Current Outpatient Medications   Medication Sig Dispense Refill    cephALEXin (KEFLEX) 500 MG capsule Take 1 capsule by mouth 4 times daily for 10 days 40 capsule 0    hydrOXYzine pamoate (VISTARIL) 50 MG capsule Take 1 capsule by mouth 2 times daily 30 capsule 2    estradiol (ESTRACE) 0.5 MG tablet Take 1 tablet by mouth daily 30 tablet 2    levothyroxine (SYNTHROID) 125 MCG tablet Take 1 tablet by mouth Daily 30 tablet 2    levETIRAcetam (KEPPRA) 1000 MG tablet Take 1 tablet by mouth twice daily 60 tablet 3    pantoprazole (PROTONIX) 40 MG tablet Take 1 tablet by mouth 2 times daily 60 tablet 2    PARoxetine (PAXIL) 30 MG tablet Take 1.5 tablets by mouth nightly 45 tablet 3    benzocaine (ORAJEL) 10 % mucosal gel Apply to the ulcer 2-3 times a day 1 each 0    albuterol (PROVENTIL) (2.5 MG/3ML) 0.083% nebulizer solution Take 3 mLs by nebulization every 6 hours as needed for Wheezing 120 each 0    albuterol sulfate  (90 Base) MCG/ACT inhaler Inhale 2 puffs into the lungs 4 times daily 1 each 0    oxyCODONE-acetaminophen (PERCOCET) 5-325 MG per tablet TAKE 1 TABLET BY MOUTH EVERY 6 HOURS AS NEEDED FOR 28 DAYS      NARCAN 4 MG/0.1ML LIQD nasal spray USE AS DIRECTED AS NEEDED FOR SUSPECTED OPIOID OVERDOSE      ondansetron (ZOFRAN ODT) 4 MG disintegrating tablet Take 1 tablet by mouth every 4 hours as needed for Nausea or Vomiting 30 tablet 3    scopolamine (TRANSDERM-SCOP) transdermal patch APPLY 1 PATCH TRANSDERMALLY TO THE SKIN BEHIND THE EAR ONCE EVERY 3 DAYS AS NEEDED 10 patch 0    Melatonin 10 MG TABS Take 10-20 mg by mouth nightly as needed      Cyanocobalamin (VITAMIN B 12 PO) Take 1 tablet by mouth daily      meclizine (ANTIVERT) 25 MG tablet Take 25 mg by mouth 3 times daily as needed for Dizziness      betamethasone valerate (VALISONE) 0.1 % ointment APPLY OINTMENT TO AFFECTED AREA TWICE DAILY TO HANDS AND ELBOWS FOR 21 DAYS      potassium gluconate 550 mg tablet Take 1 tablet by mouth daily      tiZANidine (ZANAFLEX) 4 MG tablet Take 4 mg by mouth every 8 hours as needed       ferrous sulfate (IRON 325) 325 (65 Fe) MG tablet Take 1 tablet by mouth daily (with breakfast) 90 tablet 5    Cholecalciferol (VITAMIN D3) 5000 units TABS Take 1 tablet by mouth daily      Multiple Vitamin (MVI, BARIATRIC ADVANTAGE MULTI-FORMULA, CHEW TAB) Take 1 tablet by mouth daily 30 tablet 5    Calcium Citrate-Vitamin D (CALCIUM + VIT D, BARIATRIC ADVANTAGE, CHEWABLE TABLET) Take 1 tablet by mouth 2 times daily 120 tablet 5    gabapentin (NEURONTIN) 800 MG tablet Take 800 mg by mouth 3 times daily       Allergies   Allergen Reactions    Aspirin Palpitations     \"My Heart Rate Elevates\"    Compazine [Prochlorperazine] Rash    Shellfish-Derived Products Swelling    Toradol [Ketorolac Tromethamine] Rash    Haldol [Haloperidol]      Shaking      Reglan [Metoclopramide] Itching       Nursing Notes Reviewed    Physical Exam:  Triage VS:    ED Triage Vitals [06/23/22 2056]   Enc Vitals Group      BP (!) 142/76      Heart Rate 83      Resp 14      Temp 98.5 °F (36.9 °C)      Temp Source Oral      SpO2 96 %      Weight 240 lb (108.9 kg)      Height 5' 4\" (1.626 m)      Head Circumference       Peak Flow       Pain Score       Pain Loc       Pain Edu? Excl. in [address]? Physical Exam  Vitals and nursing note reviewed. Constitutional:       General: She is not in acute distress. Appearance: She is well-developed. She is not ill-appearing or toxic-appearing. HENT:      Head: Normocephalic and atraumatic. Nose: Nose normal.      Mouth/Throat:      Mouth: Mucous membranes are moist.   Eyes:      Extraocular Movements: Extraocular movements intact. Conjunctiva/sclera: Conjunctivae normal.      Pupils: Pupils are equal, round, and reactive to light. Cardiovascular:      Rate and Rhythm: Normal rate and regular rhythm. Pulses: Normal pulses. Pulmonary:      Effort: Pulmonary effort is normal. No respiratory distress. Breath sounds: Normal breath sounds. No wheezing, rhonchi or rales. Abdominal:      General: Abdomen is flat. Bowel sounds are normal. There is no distension. Palpations: Abdomen is soft. Tenderness: There is abdominal tenderness in the epigastric area. There is no guarding or rebound. Negative signs include Champion's sign. Musculoskeletal:         General: Normal range of motion. Skin:     General: Skin is warm. Capillary Refill: Capillary refill takes less than 2 seconds. Neurological:      Mental Status: She is alert and oriented to person, place, and time. Mental status is at baseline.    Psychiatric:         Mood and Affect: Mood normal.          I have reviewed and interpreted all of the currently available lab results from this visit (if applicable):  Results for orders placed or performed during the hospital encounter of 06/23/22   CBC with Auto Differential   Result Value Ref Range    WBC 4.9 4.0 - 10.5 K/CU MM    RBC 3.83 (L) 4.2 - 5.4 M/CU MM    Hemoglobin 10.5 (L) 12.5 - 16.0 GM/DL    Hematocrit 32.8 (L) 37 - 47 %    MCV 85.6 78 - 100 FL    MCH 27.4 27 - 31 PG    MCHC 32.0 32.0 - 36.0 %    RDW 13.2 11.7 - 14.9 %    Platelets 214 490 - 701 K/CU MM    MPV 10.5 7.5 - 11.1 FL    Differential Type AUTOMATED DIFFERENTIAL     Segs Relative 47.4 36 - 66 %    Lymphocytes % 37.4 24 - 44 %    Monocytes % 9.1 (H) 0 - 4 %    Eosinophils % 4.9 (H) 0 - 3 %    Basophils % 1.0 0 - 1 %    Segs Absolute 2.3 K/CU MM    Lymphocytes Absolute 1.8 K/CU MM    Monocytes Absolute 0.5 K/CU MM    Eosinophils Absolute 0.2 K/CU MM    Basophils Absolute 0.1 K/CU MM    Nucleated RBC % 0.0 %    Total Nucleated RBC 0.0 K/CU MM    Total Immature Neutrophil 0.01 K/CU MM    Immature Neutrophil % 0.2 0 - 0.43 %   Comprehensive Metabolic Panel w/ Reflex to MG   Result Value Ref Range    Sodium 137 135 - 145 MMOL/L    Potassium 3.9 3.5 - 5.1 MMOL/L    Chloride 102 99 - 110 mMol/L    CO2 24 21 - 32 MMOL/L    BUN 22 6 - 23 MG/DL    CREATININE 0.7 0.6 - 1.1 MG/DL    Glucose 97 70 - 99 MG/DL    Calcium 9.7 8.3 - 10.6 MG/DL    Albumin 4.0 3.4 - 5.0 GM/DL    Total Protein 6.5 6.4 - 8.2 GM/DL    Total Bilirubin 0.3 0.0 - 1.0 MG/DL    ALT 15 10 - 40 U/L    AST 17 15 - 37 IU/L    Alkaline Phosphatase 138 (H) 40 - 129 IU/L    GFR Non-African American >60 >60 mL/min/1.73m2    GFR African American >60 >60 mL/min/1.73m2    Anion Gap 11 4 - 16   Lipase   Result Value Ref Range    Lipase 18 13 - 60 IU/L   Troponin   Result Value Ref Range    Troponin T <0.010 <0.01 NG/ML   Urinalysis with Microscopic   Result Value Ref Range    Color, UA YELLOW YELLOW    Clarity, UA CLEAR CLEAR    Glucose, Urine NEGATIVE NEGATIVE MG/DL    Bilirubin Urine NEGATIVE NEGATIVE MG/DL    Ketones, Urine NEGATIVE NEGATIVE MG/DL    Specific Gravity, UA 1.025 1.001 - 1.035    Blood, Urine NEGATIVE NEGATIVE    pH, Urine 6.0 5.0 - 8.0    Protein, UA NEGATIVE NEGATIVE MG/DL    Urobilinogen, Urine 0.2 0.2 - 1.0 MG/DL    Nitrite Urine, Quantitative NEGATIVE NEGATIVE    Leukocyte Esterase, Urine TRACE (A) NEGATIVE    RBC, UA 2 0 - 6 /HPF    WBC, UA 5 0 - 5 /HPF    Bacteria, UA NEGATIVE NEGATIVE /HPF    Squam Epithel, UA 4 /HPF    Trichomonas, UA NONE SEEN NONE SEEN /HPF      Radiographs (if obtained):    [] Radiologist's Report Reviewed:  CT HEAD WO CONTRAST   Final Result   Unchanged appearance of the brain without acute CT abnormality identified. CT ABDOMEN PELVIS WO CONTRAST Additional Contrast? None   Final Result   Mild to moderate formed stool load suggests constipation. Status post Jet-en-Y gastric bypass. Status post cholecystectomy with   unchanged pneumobilia. Few punctate nonobstructing left renal calculi. No acute obstructive   uropathy. Small stable left renal angiomyolipoma. XR CHEST PORTABLE   Final Result   No acute airspace disease identified. EKG (if obtained): (All EKG's are interpreted by myself in the absence of a cardiologist)  Normal sinus rhythm, rate 82, , QRS 88, QTc 441, no acute ST elevation. Similar to previous EKG on 3/17/2022. Chart review shows recent radiographs:  XR FOOT LEFT (MIN 3 VIEWS)    Result Date: 5/27/2022  EXAMINATION: THREE XRAY VIEWS OF THE LEFT FOOT 5/27/2022 7:20 pm COMPARISON: Left foot radiograph 03/17/2022. HISTORY: ORDERING SYSTEM PROVIDED HISTORY: pain after seizure TECHNOLOGIST PROVIDED HISTORY: Reason for exam:->pain after seizure Reason for Exam: pain after seizure FINDINGS: Three views provided. Mild Achilles tendinopathy. There is focal soft tissue increased density along the plantar lateral aspect of the MTP joint. No soft tissue gas foci or radiopaque foreign body Normal bone mineral density. 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 Stable 3rd metatarsal head articular collapse with mild secondary osteoarthritis. No acute fracture. 1. No acute osseous abnormality. 2. Stable 3rd metatarsal head articular collapse which may relate to avascular necrosis or prior trauma with mild secondary MTP osteoarthritis.      CT HEAD WO CONTRAST    Result Date: 5/27/2022  EXAMINATION: CT OF THE CERVICAL SPINE WITHOUT CONTRAST; CT OF THE HEAD WITHOUT CONTRAST 5/27/2022 8:01 pm TECHNIQUE: CT of the cervical spine was performed without the administration of intravenous contrast. Multiplanar reformatted images are provided for review. Automated exposure control, iterative reconstruction, and/or weight based adjustment of the mA/kV was utilized to reduce the radiation dose to as low as reasonably achievable.; CT of the head was performed without the administration of intravenous contrast. Automated exposure control, iterative reconstruction, and/or weight based adjustment of the mA/kV was utilized to reduce the radiation dose to as low as reasonably achievable. COMPARISON: None. HISTORY: ORDERING SYSTEM PROVIDED HISTORY: seizure, fall Decision Support Exception - unselect if not a suspected or confirmed emergency medical condition->Emergency Medical Condition (MA) Is the patient pregnant?->No Reason for Exam: seizure, fall CT HEAD FINDINGS: BRAIN/VENTRICLES: There is no acute intracranial hemorrhage, mass effect or midline shift. No abnormal extra-axial fluid collection. The gray-white differentiation is maintained without evidence of an acute infarct. There is no evidence of hydrocephalus. ORBITS: The visualized portion of the orbits demonstrate no acute abnormality. SINUSES: The visualized paranasal sinuses and mastoid air cells demonstrate no acute abnormality. SOFT TISSUES/SKULL:  No acute abnormality of the visualized skull or soft tissues. CT CERVICAL SPINE FINDINGS: BONES/ALIGNMENT: There is no evidence of an acute cervical spine fracture. Atlantoaxial rotation of about 22 degrees is probably related to head positioning in the CT gantry during imaging, however, mild acute traumatic atlantoaxial rotatory subluxation is a consideration given this appearance. Otherwise normal alignment of the cervical spine.  DEGENERATIVE CHANGES: No significant degenerative changes. SOFT TISSUES: There is no prevertebral soft tissue swelling. 1. CT HEAD: No acute intracranial abnormality. 2. CT CERVICAL SPINE: No acute cervical spine fracture. 3. Prominent atlantoaxial rotation can be seen with rotatory subluxation or may be related to head positioning within the CT gantry. *Note that if pain persists or worsens, or if clinically there is concern for CT occult acute cervical abnormality, flexion/extension C-spine series or MRI cervical spine may be considered for additional evaluation. CT CERVICAL SPINE WO CONTRAST    Result Date: 5/27/2022  EXAMINATION: CT OF THE CERVICAL SPINE WITHOUT CONTRAST; CT OF THE HEAD WITHOUT CONTRAST 5/27/2022 8:01 pm TECHNIQUE: CT of the cervical spine was performed without the administration of intravenous contrast. Multiplanar reformatted images are provided for review. Automated exposure control, iterative reconstruction, and/or weight based adjustment of the mA/kV was utilized to reduce the radiation dose to as low as reasonably achievable.; CT of the head was performed without the administration of intravenous contrast. Automated exposure control, iterative reconstruction, and/or weight based adjustment of the mA/kV was utilized to reduce the radiation dose to as low as reasonably achievable. COMPARISON: None. HISTORY: ORDERING SYSTEM PROVIDED HISTORY: seizure, fall Decision Support Exception - unselect if not a suspected or confirmed emergency medical condition->Emergency Medical Condition (MA) Is the patient pregnant?->No Reason for Exam: seizure, fall CT HEAD FINDINGS: BRAIN/VENTRICLES: There is no acute intracranial hemorrhage, mass effect or midline shift. No abnormal extra-axial fluid collection. The gray-white differentiation is maintained without evidence of an acute infarct. There is no evidence of hydrocephalus. ORBITS: The visualized portion of the orbits demonstrate no acute abnormality.  SINUSES: The visualized were not consistent with pancreatitis here in the emergency department. Patient had a seizure at home, this is her typical seizure. Patient is afebrile, not tachycardic, not tachypneic, blood pressure within acceptable limits, she is 95% on room air. At this time I do think patient is appropriate for outpatient follow-up. Patient follow-up with primary care provider in 1 to 2 days. She is to return to emergency department symptoms worsen, return precautions are discussed, she does verbalize agreement understanding this plan of care. 3:34 AM  Patient resting comfortably in bed, she states that she is feeling better. She denies any chest pain or headache at this time. Clinical Impression:  1. Epigastric pain    2. Chest pain, unspecified type      Disposition referral (if applicable):  MD [facility]  [address] [place] [address] [phone]    Schedule an appointment as soon as possible for a visit in 2 days      [facility] Emergency Department  [place] [facility] [phone] [phone]  Go to   As needed, If symptoms worsen    Disposition medications (if applicable):  New Prescriptions    ONDANSETRON (ZOFRAN ODT) 4 MG DISINTEGRATING TABLET    Take 1 tablet by mouth every 8 hours as needed for Nausea       Comment: Please note this report has been produced using speech recognition software and may contain errors related to that system including errors in grammar, punctuation, and spelling, as well as words and phrases that may be inappropriate. Efforts were made to edit the dictations.        Lali Taveras,   06/24/22 [id number]

## 2022-06-24 NOTE — PROGRESS NOTES
Name: Jono Prater  [id number]  Age: 46 y.o. YOB: 1970  Sex: female    CHIEF COMPLAINT:    Chief Complaint   Patient presents with    Nausea     does not think her Vickii Crumb is working and wants phenegran instead    Hypertension    Other     other chronic conditions       HISTORY OF PRESENT ILLNESS:     This is a pleasant  46 y.o. female  is seen today for management of chronic medical problems and medications refills. Previous records reviewed . Patient went to the emergency room recently yesterday with multiple complaints including fatigue, headaches, and frequent seizures, nausea etc. over the last couple months but getting worse. She recently had her Keppra increased from 500 mg twice daily to 1000 mg twice daily by her neurologist.  She is going to see her neurologist soon for further recommendations. In the ER multiple labs were done including troponin which were all negative except for mild anemia which is chronic. CT of the head on 6/23/2022 showed:  Unchanged appearance of the brain without acute CT abnormality identified. CT of the abdomen and pelvis on 6/23/2022 showed:  Mild to moderate formed stool load suggests constipation.       Status post Jet-en-Y gastric bypass.  Status post cholecystectomy with   unchanged pneumobilia.       Few punctate nonobstructing left renal calculi.  No acute obstructive   uropathy.       Small stable left renal angiomyolipoma. Chest x-ray showed:  No acute airspace disease identified. C/O fatigue , chronic. C/O aches and pains. C/O seizures . Sees neurologist.. pseudo seizures. Doing Ok otherwise. Occasional chest pain. She denies any fever, sore throat, cough or chest congestion. She had gastric bypass surgery on 8/16/2021 when she was @ 340 lbs. She has lost @ 100 lbs since then.   No further weight loss since I saw her last.     She has generalized aches and pains mostly in her lower back and legs for which she is seeing pain management doctor and taking Percocet from them.     Denies any abdominal pain. She has chronic nausea and takes Phenergan frequently. She has history recurrent pancreatitis but none since several months. Bowels are moving okay. No urinary symptoms.     Hearing is good. Vision okay with glasses. Denies any significant skin lesions.     She is going to see her neurologist soon for recurrent seizures. She carries a diagnosis of pseudoseizures.     She has been fully vaccinated for COVID-19 including the booster dose.       Past Medical History:    Patient Active Problem List   Diagnosis    Fibromyalgia    Lupus (systemic lupus erythematosus) (HCC)    Chronic pancreatitis (HCC)    HTN (hypertension)    Frequent UTI    Gastroesophageal reflux disease without esophagitis    Chronic depression    Fatty liver disease, nonalcoholic    Arthritis    Bilateral low back pain with left-sided sciatica    Hiatal hernia    Chronic pain syndrome    Drug-seeking/Aberrant behavior    Lymph node disorder    Morbid obesity with BMI of 40.0-44.9, adult ([facility])    Iron deficiency anemia    History of Jet-en-Y gastric bypass    Anxiety    RUQ pain    Discoloration of skin of flank resembling ecchymosis    Chest pain of uncertain etiology    Cellulitis of left thigh    Malabsorption    COPD (chronic obstructive pulmonary disease) (HCC)    Chronic nausea    Hypothyroidism due to acquired atrophy of thyroid    Vitamin D deficiency    Irritable bowel syndrome    Malabsorption due to intolerance, not elsewhere classified    Port-A-Cath in place    Pseudoseizures ([facility] [address]    Diarrhea        Past Surgical History:        Procedure Laterality Date    APPENDECTOMY  02/1998    Done When Tubes And Ovaries Were Removed    CARDIAC CATHETERIZATION  10/24/2010    CATHETER REMOVAL N/A 7/16/2019    PORT REMOVAL performed by Josh Baldwin MD at [address] [facility]  08/27/1991    CHOLECYSTECTOMY, LAPAROSCOPIC  1990's    COLONOSCOPY  Last Done In 2000's   [facility] DENTAL SURGERY      Teeth Extracted In Past    ENDOSCOPY, COLON, DIAGNOSTIC  Last Done In 2018    FOOT DEBRIDEMENT Left 6/16/2019    FIRST METATARSAL DEBRIDEMENT INCISION AND DRAINAGE. EXCISION OF ULCER.  RESECTION OF BONE. 1ST METATARSAL POWER LAVAGE AND BONE CEMENT performed by Kiana Mae DPM at [address] [facility] Left 4/15/2022    LEFT FOOT ABSCESS DEBRIDEMENT INCISION AND DRAINAGE, CYST REMOVAL performed by Kiana Mae DPM at [facility] [facility] Left Last Done In 2016     Dr. Wilda Curran, \" About 6 Surgeries Done Because Of Staph Infection\"    HIATAL HERNIA REPAIR N/A 2/12/2019    HERNIA HIATAL LAPAROSCOPIC ROBOTIC performed by Scar Acevedo MD at [facility] ([facility])  10/1997    Partial Abdominal Hysterectomy    INSERTION / REMOVAL / REPLACEMENT VENOUS ACCESS CATHETER N/A 8/15/2019    PORT INSERTION performed by Scar Acevedo MD at [address] [place] [address]    removed after 6 months    LEG BIOPSY EXCISION Left 3/8/2021    LEFT THIGH LESION BIOPSY EXCISION performed by Scar Acevedo MD at [facility], PARTIAL Left 2008    Benign    OTHER SURGICAL HISTORY  02/1998    \"Tubes And Ovaries Removed, Appendectomy Also Done\"    OTHER SURGICAL HISTORY  Last Done 7-15-16    Mediport Insertion \"Total Of Six Done, Removed Last Mediport In 2014\"    PORT SURGERY N/A 1/15/2020    PORT REMOVAL performed by Scar Acevedo MD at [address] [place] [address] N/A 7/6/2020    PORT INSERTION performed by Scar Acevedo MD at [address] [place] [address] Left 8/3/2021    MEDIPORT REPLACEMENT performed by Scar Acevedo MD at [facility] N/A 8/16/2021    GASTRIC BYPASS RUFINO-EN-Y LAPAROSCOPIC ROBOTIC, LYSIS OF ADHESIONS performed by Scar Acevedo MD at [facility] N/A 2/12/2019    GASTRECTOMY SLEEVE LAPAROSCOPIC ROBOTIC performed by [facility] Provider   ferrous sulfate (IRON 325) 325 (65 Fe) MG tablet Take 1 tablet by mouth 2 times daily 6/25/22  Yes Daniel Guillen MD   albuterol sulfate HFA (PROVENTIL;VENTOLIN;PROAIR) 108 (90 Base) MCG/ACT inhaler Inhale 2 puffs into the lungs 4 times daily 6/24/22  Yes Daniel Guillen MD   ondansetron (ZOFRAN ODT) 4 MG disintegrating tablet Take 1 tablet by mouth every 8 hours as needed for Nausea 6/24/22  Yes Daniel Guillen MD   cephALEXin (KEFLEX) 500 MG capsule Take 1 capsule by mouth 4 times daily for 10 days 6/23/22 7/3/22 Yes Zeyad Wilcox PA-C   hydrOXYzine pamoate (VISTARIL) 50 MG capsule Take 1 capsule by mouth 2 times daily 6/23/22  Yes Daniel Guillen MD   estradiol (ESTRACE) 0.5 MG tablet Take 1 tablet by mouth daily 5/6/22  Yes Daniel Guillen MD   levothyroxine (SYNTHROID) 125 MCG tablet Take 1 tablet by mouth Daily 5/6/22  Yes Daniel Guillen MD   levETIRAcetam (KEPPRA) 1000 MG tablet Take 1 tablet by mouth twice daily 5/3/22  Yes Daniel Guillen MD   pantoprazole (PROTONIX) 40 MG tablet Take 1 tablet by mouth 2 times daily 4/6/22  Yes Daniel Guillen MD   PARoxetine (PAXIL) 30 MG tablet Take 1.5 tablets by mouth nightly 2/7/22  Yes Daniel Guillen MD   benzocaine (ORAJEL) 10 % mucosal gel Apply to the ulcer 2-3 times a day 9/16/21  Yes Daniel Guillen MD   albuterol (PROVENTIL) (2.5 MG/3ML) 0.083% nebulizer solution Take 3 mLs by nebulization every 6 hours as needed for Wheezing 9/9/21  Yes Pk Olivia MD   oxyCODONE-acetaminophen (PERCOCET) 5-325 MG per tablet TAKE 1 TABLET BY MOUTH EVERY 6 HOURS AS NEEDED FOR 28 DAYS 8/28/21  Yes Historical Provider, MD   NARCAN 4 MG/0.1ML LIQD nasal spray USE AS DIRECTED AS NEEDED FOR SUSPECTED OPIOID OVERDOSE 8/20/21  Yes Historical Provider, MD   scopolamine (TRANSDERM-SCOP) transdermal patch APPLY 1 PATCH TRANSDERMALLY TO THE SKIN BEHIND THE EAR ONCE EVERY 3 DAYS AS NEEDED 7/4/21  Yes Katie Kiser PA-C   Melatonin 10 MG TABS Take 10-20 mg by mouth nightly as now.  Advised to see Dr. Alberto Cesar for follow-up. Advised to continue diet, exercise and weight loss. 9. Gastroesophageal reflux disease without esophagitis  Continue Protonix    10. Iron deficiency anemia due to chronic blood loss  Patient apparently is not taking ferrous sulfate now. Will restart ferrous sulfate    11. Fatty liver disease, nonalcoholic  Advised diet, exercise and weight loss    12. Chronic depression  [facility]    13. Anxiety  [facility]    14. Vitamin D deficiency  Continue vitamin D3    Advised to continue to follow COVID-19 precautions    Care discussed with patient. Questions answered and patient verbalizes understanding and agrees with plan. Medications reviewed and reconciled. Continue current medications. Appropriate prescriptions are ordered. Risks and benefits of meds are discussed. After visit summary provided. Advised to call for any problems, questions, or concerns. If symptoms worsen or don't improve as expected, to call us or go to ER. Follow up as directed, sooner if needed. Return in about 3 months (around 9/24/2022). This dictation was performed with a verbal recognition program and it was checked for errors. It is possible that there are still dictated errors within this office note. Any errors should be brought immediately to my attention for correction. All efforts were made to ensure that this office note is accurate.      Karmen Ramírez MD MD temporal

## 2022-06-25 LAB — KEPPRA: 17 UG/ML (ref 10–40)

## 2022-06-25 RX ORDER — FERROUS SULFATE 325(65) MG
325 TABLET ORAL 2 TIMES DAILY
Qty: 180 TABLET | Refills: 1 | Status: SHIPPED | OUTPATIENT
Start: 2022-06-25

## 2022-07-05 ENCOUNTER — TELEPHONE (OUTPATIENT)
Dept: INTERNAL MEDICINE CLINIC | Age: 52
End: 2022-07-05

## 2022-07-05 NOTE — TELEPHONE ENCOUNTER
Patient called and stated she is currently quarantined in [place]. Diagnosed with COVID this week. Patient is experiencing nausea and is requesting Rx of Promethazine. Fax number for Mo Marie is [phone] [facility]).

## 2022-07-05 NOTE — TELEPHONE ENCOUNTER
Called and spoke with pt and informed her that PCP is out of the office today. Informed pt that PCP will respond to her message tomorrow morning. Pt voiced understanding.

## 2022-07-05 NOTE — TELEPHONE ENCOUNTER
Pt needs Rx to go to the [facility] in [place], [place]

## 2022-07-06 NOTE — TELEPHONE ENCOUNTER
Spoke to the patient and she has Zofran and does not need the promethazine. She stated she was not sure if the Zofran would work for her. I advised her to use it and see how it does. If she still has issues she can call us. She voiced understanding that the promethazine was D/C and she is to use the Zofran.

## 2022-09-12 DIAGNOSIS — F32.A CHRONIC DEPRESSION: ICD-10-CM

## 2022-09-12 DIAGNOSIS — E89.40 SURGICAL MENOPAUSE: ICD-10-CM

## 2022-09-12 RX ORDER — PAROXETINE 30 MG/1
TABLET, FILM COATED ORAL
Qty: 135 TABLET | Refills: 0 | Status: SHIPPED | OUTPATIENT
Start: 2022-09-12

## 2022-09-12 RX ORDER — ONDANSETRON 4 MG/1
TABLET, ORALLY DISINTEGRATING ORAL
Qty: 20 TABLET | Refills: 0 | Status: SHIPPED | OUTPATIENT
Start: 2022-09-12 | End: 2022-10-18

## 2022-09-12 RX ORDER — ESTRADIOL 0.5 MG/1
TABLET ORAL
Qty: 30 TABLET | Refills: 0 | Status: SHIPPED | OUTPATIENT
Start: 2022-09-12

## 2022-09-19 ENCOUNTER — HOSPITAL ENCOUNTER (INPATIENT)
Age: 52
LOS: 16 days | Discharge: HOME HEALTH | DRG: 486 | End: 2022-10-05
Attending: EMERGENCY MEDICINE | Admitting: INTERNAL MEDICINE
Payer: COMMERCIAL

## 2022-09-19 ENCOUNTER — APPOINTMENT (OUTPATIENT)
Dept: GENERAL RADIOLOGY | Age: 52
DRG: 486 | End: 2022-09-19
Payer: COMMERCIAL

## 2022-09-19 ENCOUNTER — APPOINTMENT (OUTPATIENT)
Dept: CT IMAGING | Age: 52
DRG: 486 | End: 2022-09-19
Payer: COMMERCIAL

## 2022-09-19 ENCOUNTER — APPOINTMENT (OUTPATIENT)
Dept: ULTRASOUND IMAGING | Age: 52
DRG: 486 | End: 2022-09-19
Payer: COMMERCIAL

## 2022-09-19 DIAGNOSIS — R11.2 NON-INTRACTABLE VOMITING WITH NAUSEA, UNSPECIFIED VOMITING TYPE: ICD-10-CM

## 2022-09-19 DIAGNOSIS — M25.462 SWELLING OF JOINT OF LEFT KNEE: ICD-10-CM

## 2022-09-19 DIAGNOSIS — I82.413 ACUTE BILATERAL DEEP VEIN THROMBOSIS (DVT) OF FEMORAL VEINS (HCC): ICD-10-CM

## 2022-09-19 DIAGNOSIS — I82.432 ACUTE DEEP VEIN THROMBOSIS (DVT) OF POPLITEAL VEIN OF LEFT LOWER EXTREMITY (HCC): Primary | ICD-10-CM

## 2022-09-19 DIAGNOSIS — S89.92XD INJURY OF LEFT KNEE, SUBSEQUENT ENCOUNTER: ICD-10-CM

## 2022-09-19 DIAGNOSIS — R10.84 GENERALIZED ABDOMINAL PAIN: ICD-10-CM

## 2022-09-19 LAB
ALBUMIN SERPL-MCNC: 4.2 GM/DL (ref 3.4–5)
ALP BLD-CCNC: 129 IU/L (ref 40–129)
ALT SERPL-CCNC: 17 U/L (ref 10–40)
ANION GAP SERPL CALCULATED.3IONS-SCNC: 9 MMOL/L (ref 4–16)
AST SERPL-CCNC: 20 IU/L (ref 15–37)
BACTERIA: ABNORMAL /HPF
BASOPHILS ABSOLUTE: 0 K/CU MM
BASOPHILS RELATIVE PERCENT: 0.8 % (ref 0–1)
BILIRUB SERPL-MCNC: 0.2 MG/DL (ref 0–1)
BILIRUBIN URINE: NEGATIVE MG/DL
BLOOD, URINE: NEGATIVE
BUN BLDV-MCNC: 21 MG/DL (ref 6–23)
CALCIUM OXALATE CRYSTALS: ABNORMAL /HPF
CALCIUM SERPL-MCNC: 9.9 MG/DL (ref 8.3–10.6)
CHLORIDE BLD-SCNC: 107 MMOL/L (ref 99–110)
CLARITY: ABNORMAL
CO2: 23 MMOL/L (ref 21–32)
COLOR: YELLOW
CREAT SERPL-MCNC: 0.7 MG/DL (ref 0.6–1.1)
DIFFERENTIAL TYPE: ABNORMAL
EOSINOPHILS ABSOLUTE: 0.2 K/CU MM
EOSINOPHILS RELATIVE PERCENT: 3.9 % (ref 0–3)
ERYTHROCYTE SEDIMENTATION RATE: 19 MM/HR (ref 0–30)
GFR AFRICAN AMERICAN: >60 ML/MIN/1.73M2
GFR NON-AFRICAN AMERICAN: >60 ML/MIN/1.73M2
GLUCOSE BLD-MCNC: 91 MG/DL (ref 70–99)
GLUCOSE, URINE: NEGATIVE MG/DL
HCT VFR BLD CALC: 33.5 % (ref 37–47)
HEMOGLOBIN: 10.6 GM/DL (ref 12.5–16)
HIGH SENSITIVE C-REACTIVE PROTEIN: 6.5 MG/L (ref 0–5)
IMMATURE NEUTROPHIL %: 0.2 % (ref 0–0.43)
KETONES, URINE: NEGATIVE MG/DL
LACTATE: 0.5 MMOL/L (ref 0.4–2)
LEUKOCYTE ESTERASE, URINE: ABNORMAL
LIPASE: 20 IU/L (ref 13–60)
LYMPHOCYTES ABSOLUTE: 1.5 K/CU MM
LYMPHOCYTES RELATIVE PERCENT: 30.2 % (ref 24–44)
MCH RBC QN AUTO: 27 PG (ref 27–31)
MCHC RBC AUTO-ENTMCNC: 31.6 % (ref 32–36)
MCV RBC AUTO: 85.2 FL (ref 78–100)
MONOCYTES ABSOLUTE: 0.3 K/CU MM
MONOCYTES RELATIVE PERCENT: 6.2 % (ref 0–4)
MUCUS: ABNORMAL HPF
NITRITE URINE, QUANTITATIVE: NEGATIVE
NUCLEATED RBC %: 0 %
PDW BLD-RTO: 13.2 % (ref 11.7–14.9)
PH, URINE: 6 (ref 5–8)
PLATELET # BLD: 223 K/CU MM (ref 140–440)
PMV BLD AUTO: 9.9 FL (ref 7.5–11.1)
POTASSIUM SERPL-SCNC: 4.1 MMOL/L (ref 3.5–5.1)
PROTEIN UA: NEGATIVE MG/DL
RBC # BLD: 3.93 M/CU MM (ref 4.2–5.4)
RBC URINE: ABNORMAL /HPF (ref 0–6)
SEGMENTED NEUTROPHILS ABSOLUTE COUNT: 2.8 K/CU MM
SEGMENTED NEUTROPHILS RELATIVE PERCENT: 58.7 % (ref 36–66)
SODIUM BLD-SCNC: 139 MMOL/L (ref 135–145)
SPECIFIC GRAVITY UA: 1.02 (ref 1–1.03)
SQUAMOUS EPITHELIAL: 5 /HPF
TOTAL IMMATURE NEUTOROPHIL: 0.01 K/CU MM
TOTAL NUCLEATED RBC: 0 K/CU MM
TOTAL PROTEIN: 6.6 GM/DL (ref 6.4–8.2)
TRICHOMONAS: ABNORMAL /HPF
UROBILINOGEN, URINE: 0.2 MG/DL (ref 0.2–1)
WBC # BLD: 4.8 K/CU MM (ref 4–10.5)
WBC UA: 34 /HPF (ref 0–5)

## 2022-09-19 PROCEDURE — 87070 CULTURE OTHR SPECIMN AEROBIC: CPT

## 2022-09-19 PROCEDURE — 99285 EMERGENCY DEPT VISIT HI MDM: CPT

## 2022-09-19 PROCEDURE — 96365 THER/PROPH/DIAG IV INF INIT: CPT

## 2022-09-19 PROCEDURE — 6360000002 HC RX W HCPCS: Performed by: INTERNAL MEDICINE

## 2022-09-19 PROCEDURE — 87075 CULTR BACTERIA EXCEPT BLOOD: CPT

## 2022-09-19 PROCEDURE — 85025 COMPLETE CBC W/AUTO DIFF WBC: CPT

## 2022-09-19 PROCEDURE — 83690 ASSAY OF LIPASE: CPT

## 2022-09-19 PROCEDURE — 87040 BLOOD CULTURE FOR BACTERIA: CPT

## 2022-09-19 PROCEDURE — 2580000003 HC RX 258: Performed by: EMERGENCY MEDICINE

## 2022-09-19 PROCEDURE — 1200000000 HC SEMI PRIVATE

## 2022-09-19 PROCEDURE — 93971 EXTREMITY STUDY: CPT

## 2022-09-19 PROCEDURE — 86141 C-REACTIVE PROTEIN HS: CPT

## 2022-09-19 PROCEDURE — 80053 COMPREHEN METABOLIC PANEL: CPT

## 2022-09-19 PROCEDURE — 73560 X-RAY EXAM OF KNEE 1 OR 2: CPT

## 2022-09-19 PROCEDURE — 87205 SMEAR GRAM STAIN: CPT

## 2022-09-19 PROCEDURE — 6360000002 HC RX W HCPCS: Performed by: EMERGENCY MEDICINE

## 2022-09-19 PROCEDURE — 89051 BODY FLUID CELL COUNT: CPT

## 2022-09-19 PROCEDURE — 96367 TX/PROPH/DG ADDL SEQ IV INF: CPT

## 2022-09-19 PROCEDURE — 96372 THER/PROPH/DIAG INJ SC/IM: CPT

## 2022-09-19 PROCEDURE — 85652 RBC SED RATE AUTOMATED: CPT

## 2022-09-19 PROCEDURE — 6370000000 HC RX 637 (ALT 250 FOR IP): Performed by: INTERNAL MEDICINE

## 2022-09-19 PROCEDURE — 2580000003 HC RX 258: Performed by: INTERNAL MEDICINE

## 2022-09-19 PROCEDURE — 74177 CT ABD & PELVIS W/CONTRAST: CPT

## 2022-09-19 PROCEDURE — 89060 EXAM SYNOVIAL FLUID CRYSTALS: CPT

## 2022-09-19 PROCEDURE — 20610 DRAIN/INJ JOINT/BURSA W/O US: CPT | Performed by: STUDENT IN AN ORGANIZED HEALTH CARE EDUCATION/TRAINING PROGRAM

## 2022-09-19 PROCEDURE — 83605 ASSAY OF LACTIC ACID: CPT

## 2022-09-19 PROCEDURE — 81001 URINALYSIS AUTO W/SCOPE: CPT

## 2022-09-19 PROCEDURE — 93005 ELECTROCARDIOGRAM TRACING: CPT | Performed by: PHYSICIAN ASSISTANT

## 2022-09-19 PROCEDURE — 6360000004 HC RX CONTRAST MEDICATION: Performed by: PHYSICIAN ASSISTANT

## 2022-09-19 PROCEDURE — 96375 TX/PRO/DX INJ NEW DRUG ADDON: CPT

## 2022-09-19 PROCEDURE — 76882 US LMTD JT/FCL EVL NVASC XTR: CPT

## 2022-09-19 PROCEDURE — 6370000000 HC RX 637 (ALT 250 FOR IP): Performed by: EMERGENCY MEDICINE

## 2022-09-19 PROCEDURE — 96376 TX/PRO/DX INJ SAME DRUG ADON: CPT

## 2022-09-19 PROCEDURE — 99222 1ST HOSP IP/OBS MODERATE 55: CPT | Performed by: STUDENT IN AN ORGANIZED HEALTH CARE EDUCATION/TRAINING PROGRAM

## 2022-09-19 RX ORDER — PROMETHAZINE HYDROCHLORIDE 25 MG/ML
25 INJECTION, SOLUTION INTRAMUSCULAR; INTRAVENOUS EVERY 6 HOURS PRN
Status: DISCONTINUED | OUTPATIENT
Start: 2022-09-19 | End: 2022-10-05 | Stop reason: HOSPADM

## 2022-09-19 RX ORDER — ONDANSETRON 2 MG/ML
4 INJECTION INTRAMUSCULAR; INTRAVENOUS EVERY 6 HOURS SCHEDULED
Status: DISCONTINUED | OUTPATIENT
Start: 2022-09-19 | End: 2022-10-05 | Stop reason: HOSPADM

## 2022-09-19 RX ORDER — ENOXAPARIN SODIUM 150 MG/ML
1 INJECTION SUBCUTANEOUS 2 TIMES DAILY
Status: DISCONTINUED | OUTPATIENT
Start: 2022-09-20 | End: 2022-10-01

## 2022-09-19 RX ORDER — ACETAMINOPHEN 650 MG/1
650 SUPPOSITORY RECTAL EVERY 6 HOURS PRN
Status: DISCONTINUED | OUTPATIENT
Start: 2022-09-19 | End: 2022-10-05 | Stop reason: HOSPADM

## 2022-09-19 RX ORDER — ONDANSETRON 2 MG/ML
4 INJECTION INTRAMUSCULAR; INTRAVENOUS EVERY 6 HOURS PRN
Status: DISCONTINUED | OUTPATIENT
Start: 2022-09-19 | End: 2022-10-05 | Stop reason: HOSPADM

## 2022-09-19 RX ORDER — PROMETHAZINE HYDROCHLORIDE 25 MG/ML
25 INJECTION, SOLUTION INTRAMUSCULAR; INTRAVENOUS ONCE
Status: COMPLETED | OUTPATIENT
Start: 2022-09-19 | End: 2022-09-19

## 2022-09-19 RX ORDER — FENTANYL CITRATE 50 UG/ML
50 INJECTION, SOLUTION INTRAMUSCULAR; INTRAVENOUS ONCE
Status: COMPLETED | OUTPATIENT
Start: 2022-09-19 | End: 2022-09-19

## 2022-09-19 RX ORDER — ONDANSETRON 2 MG/ML
4 INJECTION INTRAMUSCULAR; INTRAVENOUS ONCE
Status: COMPLETED | OUTPATIENT
Start: 2022-09-19 | End: 2022-09-19

## 2022-09-19 RX ORDER — PAROXETINE HYDROCHLORIDE 20 MG/1
30 TABLET, FILM COATED ORAL DAILY
Status: DISCONTINUED | OUTPATIENT
Start: 2022-09-19 | End: 2022-10-05 | Stop reason: HOSPADM

## 2022-09-19 RX ORDER — PROMETHAZINE HYDROCHLORIDE 25 MG/ML
50 INJECTION, SOLUTION INTRAMUSCULAR; INTRAVENOUS EVERY 6 HOURS PRN
Status: DISCONTINUED | OUTPATIENT
Start: 2022-09-19 | End: 2022-09-21

## 2022-09-19 RX ORDER — PANTOPRAZOLE SODIUM 40 MG/1
40 TABLET, DELAYED RELEASE ORAL
Status: DISCONTINUED | OUTPATIENT
Start: 2022-09-20 | End: 2022-10-05 | Stop reason: HOSPADM

## 2022-09-19 RX ORDER — SODIUM CHLORIDE 0.9 % (FLUSH) 0.9 %
5-40 SYRINGE (ML) INJECTION PRN
Status: DISCONTINUED | OUTPATIENT
Start: 2022-09-19 | End: 2022-09-20 | Stop reason: SDUPTHER

## 2022-09-19 RX ORDER — POLYETHYLENE GLYCOL 3350 17 G/17G
17 POWDER, FOR SOLUTION ORAL DAILY PRN
Status: DISCONTINUED | OUTPATIENT
Start: 2022-09-19 | End: 2022-10-05 | Stop reason: HOSPADM

## 2022-09-19 RX ORDER — 0.9 % SODIUM CHLORIDE 0.9 %
1000 INTRAVENOUS SOLUTION INTRAVENOUS ONCE
Status: COMPLETED | OUTPATIENT
Start: 2022-09-19 | End: 2022-09-19

## 2022-09-19 RX ORDER — ACETAMINOPHEN 325 MG/1
650 TABLET ORAL EVERY 6 HOURS PRN
Status: DISCONTINUED | OUTPATIENT
Start: 2022-09-19 | End: 2022-10-05 | Stop reason: HOSPADM

## 2022-09-19 RX ORDER — ONDANSETRON 4 MG/1
4 TABLET, ORALLY DISINTEGRATING ORAL EVERY 8 HOURS PRN
Status: DISCONTINUED | OUTPATIENT
Start: 2022-09-19 | End: 2022-10-05 | Stop reason: HOSPADM

## 2022-09-19 RX ORDER — SODIUM CHLORIDE 0.9 % (FLUSH) 0.9 %
5-40 SYRINGE (ML) INJECTION EVERY 12 HOURS SCHEDULED
Status: DISCONTINUED | OUTPATIENT
Start: 2022-09-19 | End: 2022-09-20 | Stop reason: SDUPTHER

## 2022-09-19 RX ORDER — LEVETIRACETAM 500 MG/1
1000 TABLET ORAL 2 TIMES DAILY
Status: DISCONTINUED | OUTPATIENT
Start: 2022-09-19 | End: 2022-10-05 | Stop reason: HOSPADM

## 2022-09-19 RX ORDER — SODIUM CHLORIDE 9 MG/ML
INJECTION, SOLUTION INTRAVENOUS PRN
Status: DISCONTINUED | OUTPATIENT
Start: 2022-09-19 | End: 2022-09-20 | Stop reason: SDUPTHER

## 2022-09-19 RX ORDER — HYDROXYZINE HYDROCHLORIDE 25 MG/1
50 TABLET, FILM COATED ORAL 2 TIMES DAILY
Status: DISCONTINUED | OUTPATIENT
Start: 2022-09-19 | End: 2022-10-05 | Stop reason: HOSPADM

## 2022-09-19 RX ORDER — OXYCODONE HYDROCHLORIDE AND ACETAMINOPHEN 5; 325 MG/1; MG/1
1 TABLET ORAL EVERY 4 HOURS PRN
Status: DISCONTINUED | OUTPATIENT
Start: 2022-09-19 | End: 2022-09-26

## 2022-09-19 RX ADMIN — ONDANSETRON 4 MG: 2 INJECTION INTRAMUSCULAR; INTRAVENOUS at 13:37

## 2022-09-19 RX ADMIN — FENTANYL CITRATE 50 MCG: 50 INJECTION INTRAMUSCULAR; INTRAVENOUS at 16:29

## 2022-09-19 RX ADMIN — HYDROXYZINE HYDROCHLORIDE 50 MG: 50 TABLET, FILM COATED ORAL at 21:58

## 2022-09-19 RX ADMIN — CEFTRIAXONE SODIUM 1000 MG: 1 INJECTION, POWDER, FOR SOLUTION INTRAMUSCULAR; INTRAVENOUS at 17:13

## 2022-09-19 RX ADMIN — OXYCODONE AND ACETAMINOPHEN 1 TABLET: 5; 325 TABLET ORAL at 23:38

## 2022-09-19 RX ADMIN — LEVETIRACETAM 1000 MG: 100 INJECTION, SOLUTION INTRAVENOUS at 14:17

## 2022-09-19 RX ADMIN — APIXABAN 10 MG: 5 TABLET, FILM COATED ORAL at 17:13

## 2022-09-19 RX ADMIN — SODIUM CHLORIDE 1000 ML: 9 INJECTION, SOLUTION INTRAVENOUS at 13:36

## 2022-09-19 RX ADMIN — VANCOMYCIN HYDROCHLORIDE 2000 MG: 1 INJECTION, POWDER, LYOPHILIZED, FOR SOLUTION INTRAVENOUS at 22:04

## 2022-09-19 RX ADMIN — PROMETHAZINE HYDROCHLORIDE 25 MG: 25 INJECTION INTRAMUSCULAR; INTRAVENOUS at 17:19

## 2022-09-19 RX ADMIN — HYDROMORPHONE HYDROCHLORIDE 0.5 MG: 1 INJECTION, SOLUTION INTRAMUSCULAR; INTRAVENOUS; SUBCUTANEOUS at 21:57

## 2022-09-19 RX ADMIN — FENTANYL CITRATE 50 MCG: 50 INJECTION INTRAMUSCULAR; INTRAVENOUS at 13:38

## 2022-09-19 RX ADMIN — SODIUM CHLORIDE 25 ML: 9 INJECTION, SOLUTION INTRAVENOUS at 22:01

## 2022-09-19 RX ADMIN — IOPAMIDOL 80 ML: 755 INJECTION, SOLUTION INTRAVENOUS at 15:05

## 2022-09-19 ASSESSMENT — PAIN DESCRIPTION - LOCATION
LOCATION: ABDOMEN;LEG
LOCATION: ABDOMEN;LEG
LOCATION: LEG
LOCATION: CHEST;LEG
LOCATION: LEG;FOOT
LOCATION: ABDOMEN
LOCATION: LEG;FOOT

## 2022-09-19 ASSESSMENT — PAIN SCALES - GENERAL
PAINLEVEL_OUTOF10: 8
PAINLEVEL_OUTOF10: 6
PAINLEVEL_OUTOF10: 7
PAINLEVEL_OUTOF10: 4
PAINLEVEL_OUTOF10: 7
PAINLEVEL_OUTOF10: 8
PAINLEVEL_OUTOF10: 6

## 2022-09-19 ASSESSMENT — PAIN DESCRIPTION - ORIENTATION
ORIENTATION: RIGHT;MID
ORIENTATION: LEFT
ORIENTATION: RIGHT;LEFT
ORIENTATION: LEFT;INNER
ORIENTATION: RIGHT;LEFT

## 2022-09-19 ASSESSMENT — PAIN DESCRIPTION - FREQUENCY
FREQUENCY: CONTINUOUS

## 2022-09-19 ASSESSMENT — PAIN DESCRIPTION - DESCRIPTORS
DESCRIPTORS: SHARP
DESCRIPTORS: ACHING;CRAMPING
DESCRIPTORS: ACHING;CRAMPING;THROBBING
DESCRIPTORS: ACHING
DESCRIPTORS: ACHING
DESCRIPTORS: THROBBING;STABBING
DESCRIPTORS: ACHING;BURNING

## 2022-09-19 ASSESSMENT — PAIN DESCRIPTION - PAIN TYPE
TYPE: ACUTE PAIN

## 2022-09-19 ASSESSMENT — PAIN - FUNCTIONAL ASSESSMENT: PAIN_FUNCTIONAL_ASSESSMENT: ACTIVITIES ARE NOT PREVENTED

## 2022-09-19 NOTE — ED NOTES
[id number] paged hospitalist     Bella Gutierrez  09/19/22 [id number] hospitalist returned call      Bella Gutierrez  09/19/22 [phone]

## 2022-09-19 NOTE — ED NOTES
18 Dr Mark Martin notified on in pt consult.  Added to treatment team.      Clarissa Gilbert  09/19/22 [id number]

## 2022-09-19 NOTE — PROGRESS NOTES
[address] [facility]  consulted by Dr. Yael Davis for monitoring and adjustment. Indication for treatment: bone and joint infection  Goal trough: [] 10-15 mcg/mL or [x] 15-20 mcg/ml  AUC/GLORIA: [] <500 or [x] 400-600    Pertinent Laboratory Values:   Temp Readings from Last 3 Encounters:   09/19/22 98.4 °F (36.9 °C)     Recent Labs     09/19/22  1348   WBC 4.8   LACTATE 0.5     Recent Labs     09/19/22  1348   BUN 21   CREATININE 0.7     Estimated Creatinine Clearance: 112 mL/min (based on SCr of 0.7 mg/dL). No intake or output data in the 24 hours ending 09/19/22 1845    Pertinent Cultures:  Date    Source    Results    None as yet      Vancomycin level:   TROUGH:  No results for input(s): VANCOTROUGH in the last 72 hours. RANDOM:  No results for input(s): VANCORANDOM in the last 72 hours. Assessment:  SCr, BUN, and urine output: Cr 0.7, BUN 21  Day(s) of therapy: Day 1  Vancomycin concentration: to be collected    Plan:  Vancomycin 2000mg IVPB x 1 then 1000mg Q12h. Will collect a level to assess dosing. Pharmacy will continue to monitor patient and adjust therapy as indicated    Apoorva 3 09/21 @0600    Thank you for the consult.   Johanna Gudino, [address] [place] [address]  9/19/2022 6:45 PM

## 2022-09-19 NOTE — ED NOTES
Dr. Kendy Gonzalez at bedside     [facility] Laura, [facility]  09/19/22 [id number]

## 2022-09-19 NOTE — CONSULTS
Orthopaedic Consult    Patient Name: Blank Carmona   ([birth year])  MRN   [id number]   Today's date:  9/19/2022    CHIEF COMPLAINT:   Left knee pain, effusion    HISTORY OF PRESENT ILLNESS:      The patient is a 46 y.o. female  who presented to the ER with this evening with nausea and vomiting as well as left knee swelling and pain. Patient states the left knee swelling began approximately 3 days ago and the swelling and pain has worsened. She denies any prior left knee pain or swelling. She denies any prior left knee issues. Patient does have a prior history of left great toe infection and has a history of approximately 7 surgeries over the last 8 years with a podiatrist for staph infection of the left foot. She had no other areas of infection previously. No recent injury to the left knee. No additional left knee complaints at this time including stiffness or sensation of instability when ambulating. Ultrasound performed earlier today of the left lower extremity did demonstrate a nonocclusive thrombus in the left distal femoral and popliteal veins. No imaging of the left knee was otherwise done. Patient denies any prior knee infection and has  not been on any recent antibiotics for the current symptoms. However, patient has received vancomycin as well as ceftriaxone in the emergency room prior to myself being contacted and being able to see the patient. Patient admits to nausea and vomiting but denies  chest pain, SOB, fever, chills. Patient denies numbness, tingling. Patient denies any prior left knee pain. No other areas of pain at this time. No pain in the left foot at this time. Patient has mild pain with palpation of the joint line of the knee as well as diffuse mild pain in the suprapatellar pouch with mild effusion. Related history: negative for prior left knee surgery, trauma, arthritis.   Patient denies any history of gout in any other type of joint infection other than what was previously discussed with the left foot. She does have a history of lupus. Again, antibiotics were given before aspiration of the knee could be performed. Past Medical History         Diagnosis Date    Arthritis     Depression     Disease of blood and blood forming organ     Hypertension     Immune deficiency disorder ([address]     Kidney stone     Seizures ([facility] [address]     Thyroid disease        Past Surgical History         Procedure Laterality Date    ABDOMEN SURGERY      APPENDECTOMY      CHOLECYSTECTOMY      HERNIA REPAIR      HYSTERECTOMY (CERVIX STATUS UNKNOWN)      TONSILLECTOMY      VASCULAR SURGERY         Medications Prior to Admission:     Prior to Admission medications    Not on File       Allergies     Asa [aspirin], Compazine [prochlorperazine], Reglan [metoclopramide], and Toradol [ketorolac tromethamine]    Social History   TOBACCO:   has no history on file for tobacco use. ETOH:   reports no history of alcohol use. Family History   History reviewed. No pertinent family history. Review of Systems   As in the admission H&P, reviewed and unchanged. Additional Musculoskeletal review as in HPI. Physical Exam:    Vitals: /79   Pulse (!) 106   Temp 98.6 °F (37 °C) (Oral)   Resp 18   Ht 5' 4\" (1.626 m)   Wt 238 lb (108 kg)   SpO2 98%   BMI 40.85 kg/m² ,  Body mass index is 40.85 kg/m². General appearance: Obese, alert, appears stated age and cooperative  Skin: Skin color normal. No rashes or lesions  HEENT: Head: Normocephalic, no lesions, without obvious abnormality. Neck: supple, symmetrical, trachea midline    Extremities:    Left knee mildly tender to palpation at the joint line and suprapatellar region. No compressive soft dressing   -Mild knee effusion with mild warmth. No other concerning signs of infection including erythema, wound/laceration, puncture wounds, rash   -Minimal pain with range of motion of the knee, and no pain with range of motion of the hip or ankle. Patient wiggles toes without difficulty. Patient does have previous fusion of the left great toe and is able to wiggle this but no pain with palpation or movement throughout the other toes. -Good distal pulses with good capillary refill   -sensation intact to light touch   -Skin intact with no laceration. Contralateral LE:    -no tenderness to palpation or pain with gentle AROM or PROM of the hip, knee or ankle   -skin intact, 2+ DP pulse, 5/5 strength globally    Bilateral UE's:   -No tenderness over shoulders, elbows, wrists, hands. Full painless AROM. Skin intact, sens/motor function intact throughout    Neurologic: Mental status: Alert, oriented, thought content appropriate      PROCEDURE:  Left Suprolateral patetellar Aspiration Procedure Note   Pre-operative Diagnosis: Left knee effusion, concern for septic joint   Post-operative Diagnosis: same   Indications: Rule out septic joint   Anesthesia: Ice  Procedure Details   Verbal consent was obtained for the procedure. The knee was marked at the appropriate spot at the suprapatellar portion of the knee and this area was iced for several minutes to help with anesthesia of the aspiration site. The knee was then prepped with chlorheaxadine scrub. A 21 gauge needle was advanced into the superolateral aspect of the knee into the superopatellar area without difficulty The space was then aspirated without difficulty and a total of 30 cc of straw colored fluid, nonturbid, minimally blood-tinged fluid was removed from the knee. A bandaid was applied. Complications: None; patient tolerated the procedure well. Symptoms were improved immediately after the aspiration.            Labs     CBC:   Recent Labs     09/19/22  1348   WBC 4.8   HGB 10.6*        BMP:    Recent Labs     09/19/22  1348      K 4.1      CO2 23   BUN 21   CREATININE 0.7   GLUCOSE 91     INR:  No results found for: INR, PROTIME     X-ray view   Imaging Review  No orthopedic imaging at this time    Ultrasound extremity left nonvascular demonstrates:  1. Nonocclusive thrombus in the left distal femoral and popliteal veins. 2. Anechoic suprapatellar fluid collection may represent joint effusion or   fluid within the prepatellar bursa. Assessment and Plan     1. Left knee effusion, minimal concern for septic joint    The patient has been admitted to the hospitalist service for concern for multiple medical comorbidities as well as concern for septic left knee effusion. Aspiration of the left knee was performed at bedside without complication and left knee synovial fluid was sent for gram stain, culture/sensitivity, cell count, crystals. Patient will be made NPO overnight and pending synovial fluid results, we will consider arthroscopic washout of the knee. Patient may continue antibiotics as ordered by the hospitalist.    Compressive dressing to be placed by nursing staff. Will obtain x-ray imaging of the left knee as well. Patient may be ROM as tolerated and WBAT at this time. Ice and elevate the knee to help with swelling. Pain medication per admitting. Will see patient tomorrow after lab results have been posted and discuss further treatment plan from an orthopedic standpoint. No further orthopedic intervention at this moment.     All questions were answered and patient voiced their understanding    Lucia Dietrich DO

## 2022-09-19 NOTE — ED PROVIDER NOTES
EKG sinus bradycardia, ventricular rate of 57, WV interval 190, QRS duration 90, QT/QTc 414/402, no ST elevation noted. Missy Pineda DO  22 1221    Emergency Department Encounter    Patient: Blank Carmona  MRN: [id number]  : 1970  Date of Evaluation: 2022  ED Provider:  Missy Pineda DO    Triage Chief Complaint:   No chief complaint on file. Jena:  Blank Carmona is a 46 y.o. female that presents to the emergency department complaint of left lower extremity pain and swelling. She states he has had multiple left foot surgeries by podiatrist.  Patient states yesterday she felt a pop in the back of her thigh. Patient is a history of blood clots or concern for DVT. Patient states no pus drainage discharge redness to the left lower extremity. Patient that she is still ambulatory. Patient states she does have a upcoming appointment with her podiatrist tomorrow. Patient states she has had this in the leg pain for the last 2 weeks which is has gotten worse. Patient states she has a history of a chronic abdominal pain with history of abdominal surgeries appendectomy cholecystectomy hernia repair hysterectomy. Patient states she was in [place] a couple weeks ago and was seen in the ER got a CT scan which showed some biliary dilatation. Patient states she is here for reevaluation. She states she been having nausea vomiting abdominal pain for last 1-1/2 weeks. Patient states she has an appointment with her gastroenterologist at 12:45 PM tomorrow. Patient here for evaluation. She denies any chest pain shortness breath fever chills cough sore throat runny nose earache falls injuries, heavy lifting pulling or straining.     ROS - see HPI, below listed is current ROS at time of my eval:  General:  No fevers, no chills, no weakness  Eyes:  No recent vison changes, no discharge  ENT:  No sore throat, no nasal congestion, no hearing changes  Cardiovascular:  No chest pain, no palpitations  Respiratory:  No shortness of breath, no cough, no wheezing  Gastrointestinal: Positive for abdominal pain nausea vomiting no diarrhea  Musculoskeletal:  No muscle pain, no joint pain  Skin:  No rash, no pruritis, no easy bruising  Neurologic:  No speech problems, no headache, no extremity numbness, no extremity tingling, no extremity weakness  Psychiatric:  No anxiety  Genitourinary:  No dysuria, no hematuria  Endocrine:  No unexpected weight gain, no unexpected weight loss  Extremities: Left lower extremity pain and swelling    Past Medical History:   Diagnosis Date    Arthritis     Depression     Disease of blood and blood forming organ     Hypertension     Immune deficiency disorder ([facility] [address]     Kidney stone     Seizures ([facility] [address]     Thyroid disease      Past Surgical History:   Procedure Laterality Date    ABDOMEN SURGERY      APPENDECTOMY      CHOLECYSTECTOMY      HERNIA REPAIR      HYSTERECTOMY (CERVIX STATUS UNKNOWN)      TONSILLECTOMY      VASCULAR SURGERY       History reviewed. No pertinent family history.   Social History     Socioeconomic History    Marital status:      Spouse name: Not on file    Number of children: Not on file    Years of education: Not on file    Highest education level: Not on file   Occupational History    Not on file   Tobacco Use    Smoking status: Never    Smokeless tobacco: Not on file   Substance and Sexual Activity    Alcohol use: Never    Drug use: Never    Sexual activity: Not Currently   Other Topics Concern    Not on file   Social History Narrative    Not on file     Social Determinants of Health     Financial Resource Strain: Not on file   Food Insecurity: Not on file   Transportation Needs: Not on file   Physical Activity: Not on file   Stress: Not on file   Social Connections: Not on file   Intimate Partner Violence: Not on file   Housing Stability: Not on file     Current Facility-Administered Medications   Medication Dose Route Frequency Provider Last Rate Last Admin    0.9 % sodium chloride bolus  1,000 mL IntraVENous Once Jeral Naegeli, DO 1,000 mL/hr at 09/19/22 1336 1,000 mL at 09/19/22 1336    levETIRAcetam (KEPPRA) 1,000 mg in sodium chloride 0.9 % 100 mL IVPB  1,000 mg IntraVENous Once Jeral Naegeli, DO         No current outpatient medications on file. Allergies   Allergen Reactions    Asa [Aspirin]     Compazine [Prochlorperazine]     Reglan [Metoclopramide]     Toradol [Ketorolac Tromethamine]        Nursing Notes Reviewed    Physical Exam:  Triage VS:    ED Triage Vitals   Enc Vitals Group      BP 09/19/22 1134 (!) 118/101      Heart Rate 09/19/22 1134 58      Resp 09/19/22 1134 18      Temp 09/19/22 1134 98.4 °F (36.9 °C)      Temp src --       SpO2 09/19/22 1134 96 %      Weight 09/19/22 1133 238 lb (108 kg)      Height 09/19/22 1133 5' 4\" (1.626 m)      Head Circumference --       Peak Flow --       Pain Score --       Pain Loc --       Pain Edu? --       Excl. in [address]? --        My pulse ox interpretation is - normal    General appearance:  No acute distress. Skin:  Warm. Dry. Eye:  Extraocular movements intact. Ears, nose, mouth and throat:  Oral mucosa moist   Neck:  Trachea midline. Extremity: Left leg pain and tenderness to palpation, trace edema, multiple foot surgeries noted healed intact distal pulses left lower extremity, normal capillary refill each of the digits left foot, no signs of infection pus drainage discharge abscess or cellulitis of the left lower extremity, left knee swelling noted when compared to the right knee, decreased range of motion in flexion of the knee secondary to pain and swelling,. Heart:  Regular rate and rhythm, normal S1 & S2, no extra heart sounds. Perfusion:  intact  Respiratory:  Lungs clear to auscultation bilaterally. Respirations nonlabored. Abdominal:  Normal bowel sounds. Soft. Diffusely tender, no rigidity or rebound, morbidly obese abdomen, non distended.   Back:  No CVA tenderness to palpation     Neurological:  Alert and oriented times 3. No focal neuro deficits.              Psychiatric:  Appropriate    I have reviewed and interpreted all of the currently available lab results from this visit (if applicable):  Results for orders placed or performed during the hospital encounter of 09/19/22   Culture, Aerorbic and Anarobic, Joint    Specimen: Synovial Fluid   Result Value Ref Range    Specimen SYNOVIAL FLUID LT KNEE     Special Requests NONE     Gram Smear FEW  NECROTIC POLYS       Gram Smear NO ORGANISMS SEEN     Culture       Prelim Report No growth to date Anaerobic culture further report to follow   CBC with Auto Differential   Result Value Ref Range    WBC 4.8 4.0 - 10.5 K/CU MM    RBC 3.93 (L) 4.2 - 5.4 M/CU MM    Hemoglobin 10.6 (L) 12.5 - 16.0 GM/DL    Hematocrit 33.5 (L) 37 - 47 %    MCV 85.2 78 - 100 FL    MCH 27.0 27 - 31 PG    MCHC 31.6 (L) 32.0 - 36.0 %    RDW 13.2 11.7 - 14.9 %    Platelets 032 866 - 643 K/CU MM    MPV 9.9 7.5 - 11.1 FL    Differential Type AUTOMATED DIFFERENTIAL     Segs Relative 58.7 36 - 66 %    Lymphocytes % 30.2 24 - 44 %    Monocytes % 6.2 (H) 0 - 4 %    Eosinophils % 3.9 (H) 0 - 3 %    Basophils % 0.8 0 - 1 %    Segs Absolute 2.8 K/CU MM    Lymphocytes Absolute 1.5 K/CU MM    Monocytes Absolute 0.3 K/CU MM    Eosinophils Absolute 0.2 K/CU MM    Basophils Absolute 0.0 K/CU MM    Nucleated RBC % 0.0 %    Total Nucleated RBC 0.0 K/CU MM    Total Immature Neutrophil 0.01 K/CU MM    Immature Neutrophil % 0.2 0 - 0.43 %   Comprehensive Metabolic Panel   Result Value Ref Range    Sodium 139 135 - 145 MMOL/L    Potassium 4.1 3.5 - 5.1 MMOL/L    Chloride 107 99 - 110 mMol/L    CO2 23 21 - 32 MMOL/L    BUN 21 6 - 23 MG/DL    Creatinine 0.7 0.6 - 1.1 MG/DL    Glucose 91 70 - 99 MG/DL    Calcium 9.9 8.3 - 10.6 MG/DL    Albumin 4.2 3.4 - 5.0 GM/DL    Total Protein 6.6 6.4 - 8.2 GM/DL    Total Bilirubin 0.2 0.0 - 1.0 MG/DL    ALT 17 10 - 40 U/L    AST 20 15 - 37 IU/L    Alkaline Phosphatase 129 40 - 129 IU/L    GFR Non-African American >60 >60 mL/min/1.73m2    GFR African American >60 >60 mL/min/1.73m2    Anion Gap 9 4 - 16   Lipase   Result Value Ref Range    Lipase 20 13 - 60 IU/L   Urinalysis with Microscopic   Result Value Ref Range    Color, UA YELLOW YELLOW    Clarity, UA SLIGHTLY CLOUDY (A) CLEAR    Glucose, Urine NEGATIVE NEGATIVE MG/DL    Bilirubin Urine NEGATIVE NEGATIVE MG/DL    Ketones, Urine NEGATIVE NEGATIVE MG/DL    Specific Gravity, UA 1.025 1.001 - 1.035    Blood, Urine NEGATIVE NEGATIVE    pH, Urine 6.0 5.0 - 8.0    Protein, UA NEGATIVE NEGATIVE MG/DL    Urobilinogen, Urine 0.2 0.2 - 1.0 MG/DL    Nitrite Urine, Quantitative NEGATIVE NEGATIVE    Leukocyte Esterase, Urine MODERATE (A) NEGATIVE    RBC, UA NONE SEEN 0 - 6 /HPF    WBC, UA 34 (H) 0 - 5 /HPF    Bacteria, UA RARE (A) NEGATIVE /HPF    Squam Epithel, UA 5 /HPF    Mucus, UA RARE (A) NEGATIVE HPF    Trichomonas, UA NONE SEEN NONE SEEN /HPF    Ca Oxalate Zoila, UA MODERATE /HPF   Lactic Acid   Result Value Ref Range    Lactate 0.5 0.4 - 2.0 mMOL/L   C-Reactive Protein   Result Value Ref Range    CRP, High Sensitivity 6.5 (H) 0.0 - 5.0 mg/L   Sedimentation Rate   Result Value Ref Range    Sed Rate 19 0 - 30 MM/HR   Comprehensive Metabolic Panel w/ Reflex to MG   Result Value Ref Range    Sodium 140 135 - 145 MMOL/L    Potassium 3.8 3.5 - 5.1 MMOL/L    Chloride 108 99 - 110 mMol/L    CO2 23 21 - 32 MMOL/L    BUN 13 6 - 23 MG/DL    Creatinine 0.6 0.6 - 1.1 MG/DL    Glucose 126 (H) 70 - 99 MG/DL    Calcium 9.4 8.3 - 10.6 MG/DL    Albumin 3.6 3.4 - 5.0 GM/DL    Total Protein 5.5 (L) 6.4 - 8.2 GM/DL    Total Bilirubin 0.2 0.0 - 1.0 MG/DL    ALT 16 10 - 40 U/L    AST 22 15 - 37 IU/L    Alkaline Phosphatase 116 40 - 128 IU/L    GFR Non-African American >60 >60 mL/min/1.73m2    GFR African American >60 >60 mL/min/1.73m2    Anion Gap 9 4 - 16   CBC with Auto Differential   Result Value Ref Range    WBC 3.9 (L) 4.0 - 10.5 K/CU MM    RBC 3.69 (L) 4.2 - 5.4 M/CU MM    Hemoglobin 9.8 (L) 12.5 - 16.0 GM/DL    Hematocrit 33.0 (L) 37 - 47 %    MCV 89.4 78 - 100 FL    MCH 26.6 (L) 27 - 31 PG    MCHC 29.7 (L) 32.0 - 36.0 %    RDW 13.1 11.7 - 14.9 %    Platelets 446 487 - 843 K/CU MM    MPV 10.0 7.5 - 11.1 FL    Differential Type AUTOMATED DIFFERENTIAL     Segs Relative 55.4 36 - 66 %    Lymphocytes % 32.5 24 - 44 %    Monocytes % 7.2 (H) 0 - 4 %    Eosinophils % 4.1 (H) 0 - 3 %    Basophils % 0.8 0 - 1 %    Segs Absolute 2.2 K/CU MM    Lymphocytes Absolute 1.3 K/CU MM    Monocytes Absolute 0.3 K/CU MM    Eosinophils Absolute 0.2 K/CU MM    Basophils Absolute 0.0 K/CU MM    Nucleated RBC % 0.0 %    Total Nucleated RBC 0.0 K/CU MM    Total Immature Neutrophil 0.00 K/CU MM    Immature Neutrophil % 0.0 0 - 0.43 %   Crystals, Body Fluid   Result Value Ref Range    Crystals, Fluid NO CRYSTALS SEEN    Cell Count with Differential, Body Fluid   Result Value Ref Range    Fluid Type SYNOVIAL FLUID INDEX    RBC, Fluid 110,000 /CU MM    WBC, Fluid 73,100 /CU MM    Neutrophil Count, Fluid 90 %    Lymphocytes, Body Fluid 10 %    Monocyte Count, Fluid 0 %    Mesothelial, Fluid 0 /100 WBC    Other Cells, Fluid 0    EKG 12 Lead   Result Value Ref Range    Ventricular Rate 57 BPM    Atrial Rate 57 BPM    P-R Interval 190 ms    QRS Duration 90 ms    Q-T Interval 414 ms    QTc Calculation (Bazett) 402 ms    P Axis 49 degrees    R Axis -15 degrees    T Axis 30 degrees    Diagnosis       Sinus bradycardia  Cannot rule out Anterior infarct , age undetermined  Abnormal ECG  No previous ECGs available        Radiographs (if obtained):  Radiologist's Report Reviewed:  XR KNEE LEFT (1-2 VIEWS)   Final Result   No acute osseous abnormality of the left knee. VL DUP LOWER EXTREMITY VENOUS LEFT   Final Result   1. Nonocclusive thrombus in the left distal femoral and popliteal veins.    2. Anechoic suprapatellar fluid collection may represent joint effusion or   fluid within the prepatellar bursa. Findings were discussed with CHARLIE Staples at 4:34 pm on 9/19/2022. US EXTREMITY LEFT NON VASC LIMITED   Final Result   1. Nonocclusive thrombus in the left distal femoral and popliteal veins. 2. Anechoic suprapatellar fluid collection may represent joint effusion or   fluid within the prepatellar bursa. Findings were discussed with CHARLIE Staples at 4:34 pm on 9/19/2022. CT ABDOMEN PELVIS W IV CONTRAST Additional Contrast? None   Preliminary Result   1. Status post Jet-en-Y gastric bypass. Gastric pouch is mildly distended   with ingested contents but is otherwise nonspecific. This appears slightly   more distended when compared to recent CTs and could be related to recent   meal. The esophagus and Jet limb is normal in caliber. 2. Stable pneumobilia. 3. No other acute abdominal or pelvic abnormality. 4. Punctate nonobstructing left renal calculus. EKG (if obtained): (All EKG's are interpreted by myself in the absence of a cardiologist)      MDM:  Patient presents emergency department complaining of of left lower leg pain swelling tenderness concern for DVT history of DVT in the past.  Venous Doppler ordered. Patient also complained of abdominal pain nausea vomiting. Patient with chronic abdominal pain multiple abdominal surgeries. Patient was ordered laboratory studies and CT abdomen pelvis. Patient was ordered fentanyl Zofran and IV fluids. Patient concerned she has been able to keep down her seizure medication was ordered Keppra 1000 mg. Venous Doppler positive for nonocclusive venous thrombus left distal femoral and popliteal veins, suprapatellar fluid collection may represent joint effusion or fluid within the prepatellar bursa. Patient will need admission for DVT and complaints of abdominal pain nausea vomiting. Hospitalist consulted will be admitting patient to his service.     Clinical Impression:  1. Acute deep vein thrombosis (DVT) of popliteal vein of left lower extremity (HCC)    2. Non-intractable vomiting with nausea, unspecified vomiting type    3. Generalized abdominal pain    4. Injury of left knee, subsequent encounter        ED Provider Disposition Time  DISPOSITION  0619      Comment: Please note this report has been produced using speech recognition software and may contain errors related to that system including errors in grammar, punctuation, and spelling, as well as words and phrases that may be inappropriate. Efforts were made to edit the dictations.          Pollo Lopes DO  09/20/22 0667

## 2022-09-19 NOTE — ED PROVIDER NOTES
PROVIDER [facility]  eMERGENCY dEPARTMENT eNCOUnter      Pt Name: Arielle Jeff  MRN: [id number]  Date of evaluation: 9/19/2022      Chief Complaint:  No chief complaint on file. HPI: (Need 1 HPI)  This is a  46 y.o. female who presents to the emergency department for epigastrium pain, nausea vomiting, concern of blood clot in her left lower extremity. Patient states that she was seen on 9/1 at an emergency department in [place], at that time it showed that she had pneumobilia in the common bile duct/intrahepatic ducts. She did not get follow-up on this, is scheduled to see GI, Dr. Henry Casarez tomorrow. Is concerned that she may have pancreatitis. She has history of acute on chronic abdominal issues. Is actively nausea and vomiting, retching. Also complaining of left lower extremity pain, states that it started in her calf and up the proximal leg. Now having a cramping sensation into the proximal leg. Has history of blood clots. Is concerned. Is having no other redness erythremia. No distal numbness tingling or weakness. ROS: (Need 1 ROS)  At least 4 systems reviewed and otherwise acutely negative except as in the [address] Physical Exam: (Pertinent Negatives and Positives)  ED Triage Vitals   BP Temp Temp src Heart Rate Resp SpO2 Height Weight   09/19/22 1134 09/19/22 1134 -- 09/19/22 1134 09/19/22 1134 09/19/22 1134 09/19/22 1133 09/19/22 1133   (!) 118/101 98.4 °F (36.9 °C)  58 18 96 % 5' 4\" (1.626 m) 238 lb (108 kg)     GENERAL APPEARANCE: Awake and alert. Cooperative. No acute distress. HEAD: Normocephalic. Atraumatic. EYES: EOM's grossly intact. Sclera anicteric. ENT: Mucous membranes are moist. Tolerates saliva. No trismus. NECK: Supple. No meningismus. Trachea midline. HEART: RRR. Radial pulses 2+. LUNGS: Respirations unlabored. CTAB  ABDOMEN: Soft, no discoloration, mildly tender in the epigastrium.   No rebound or guarding. EXTREMITIES: No acute deformities. SKIN: Warm and dry. NEUROLOGICAL: No gross facial drooping. Moves all 4 extremities spontaneously. PSYCHIATRIC: Normal mood. PLAN:  LABS:   Labs Reviewed   CBC WITH AUTO DIFFERENTIAL   COMPREHENSIVE METABOLIC PANEL   LIPASE   URINALYSIS WITH MICROSCOPIC   LACTIC ACID     Radiology Studies:   CT ABDOMEN PELVIS W IV CONTRAST Additional Contrast? None    (Results Pending)   VL DUP LOWER EXTREMITY VENOUS LEFT    (Results Pending)       Patient seen and examined. Medical screening performed. Patient having ongoing upper abdominal pain. Does have a CT scan from 9/1 demonstrating this pneumobilia of the common bile duct/intrahepatic ducts. Is also concerned about a possible blood clot. Medication/diagnostics ordered. Patient was seen by me in triage.  Please see the full history, physical and final disposition note by the other provider in main emergency department    (Please note sections of this note were completed with a voice recognition program.  Efforts were made to edit the dictations but occasionally words are mis-transcribed.)    Electronically signed, Alejo Hunter,          [facility]  09/19/22 [id number]

## 2022-09-19 NOTE — H&P
V2.0  History and Physical      Name:  Jackie Alcantar /Age/Sex: 1970  (46 y.o. female)   MRN & CSN:  [id number] & [id number] Encounter Date/Time: 2022 6:13 PM EDT   Location:  [place] [address] PCP: No primary care provider on file.        Hospital Day: 1    Assessment and Plan:   Jackie Alcantar is a 46 y.o. female with a pmh of Hypertension, HLD, seizures  who presents with Acute bilateral deep vein thrombosis (DVT) of femoral veins [facility]    Hospital Problems             Last Modified POA    * (Principal) Acute bilateral deep vein thrombosis (DVT) of femoral veins ([facility] [address] 2022 Yes       #Intractable nausea and vomiting  #Chronic pneumobilia  -Patient with 4 to 5 days of nausea and vomiting of bilious substance associated with diarrhea  -Unable to tolerate food or her medications    Plan:  GI consulted  Stool pathogen PCR  N.p.o.  Zofran to 8 hours schedule  Phenergan as needed    #L knee effusion, concern for septic joint  -patient with h/o multiple staph infections in L foot  -no leukocytosis (however patient states that she does not get a leukocytosis with her foot infections)    Plan:  Patient received ceftriaxone in the ED  Vancomycin   Ortho consulted for arthrocentesis    #Acute nonocclusive DVT of L distal femoral and popliteal veins  -has been having a lot of surgeries recently     Plan:  Received 1 dose of eliquis in the ED  Start lovenox from tomorrow    Chronic medical problems:   #Seizure disorder  Plan: continue keppra    #HTN  Plan: does not appear to be on any home antihypertensives (on other chart)    #h/o L foot infection/osteomyelitis requiring surgical removal of part of bone  Plan: currently stable, patient would like us to contact Dr. Ronak George to let him know she is here    Disposition:   Current Living situation: home  Expected Disposition: home  Estimated D/C: 2-3 days    Diet No diet orders on file   DVT Prophylaxis [] Lovenox, []  Heparin, [] SCDs, [] Ambulation,  [x] Eliquis, [] Xarelto   Code Status No Order   Surrogate Decision Maker/ POA NA     History from:     patient    History of Present Illness:     Chief Complaint: Acute bilateral deep vein thrombosis (DVT) of femoral veins ([address]  Katiana Koehler is a 46 y.o. female with pmh of Hypertension, HLD, seizures and multiple left foot infections requiring surgical removal of part of the bone who presents with intractable nausea and vomiting for 45 days. Patient also has a history of pancreatitis which she has had multiple times and feels like it might be the beginning of it. She does follow with Dr. Stewart Foss as outpatient and had an appointment with him tomorrow but she was feeling so terrible she came in. Has not eaten in multiple days but has been trying to keep hydrated with Pedialyte. Has not been able to keep her meds down. She does endorse lightheadedness, headache, epigastric abdominal pain which radiates to her back which are described as tight/spasmodic and sometimes stabbing in nature. Her Percocet at home was not working for the pain. She states it now hurts to take a deep breath. Patient has been having episodes of bilious vomiting as well as diarrhea with 2-3 nonbloody and nonmelanotic stools per day (normally she has 1 bowel movement per day). She did feel feverish last night and then became diaphoretic. Additionally she states she has a history of a low left foot infection which has grown staph multiple times and has had multiple surgeries to it. She follows with Dr. Karely Bradley in podiatry. She states that her leg has been increasingly more painful over the last couple of weeks and yesterday she stretched her leg and felt a pop at the back of her knee/calf area. She has never had a blood clot in the past and never had any sort of cyst at the back of her knee.      Review of Systems: Need 10 Elements   Review of Systems    General: +fever, no chills, + lightheadedness, no dizziness, +decreased appetite  Eyes: No blurry vision  Ears: No hearing changes  Nose: No runny nose  Throat: No sore throat  Neck: No neck pain  Heart: No chest pain, no palpitations, no PND, no orthopnea  Lungs: No shortness of breath, no cough  Abdomen: + abdominal pain, +nausea, + vomiting, no constipation, + diarrhea  : No frequency, no dysuria, no urgency, no decreased urination  MSK: + lower extremity swelling  Neuro: No confusion, no weakness  Skin: No rashes    Objective:   No intake or output data in the 24 hours ending 09/19/22 1813   Vitals:   Vitals:    09/19/22 1133 09/19/22 1134 09/19/22 1335 09/19/22 1629   BP:  (!) 118/101 (!) 123/49    Pulse:  58 54    Resp:  18 18 18   Temp:  98.4 °F (36.9 °C)     SpO2:  96% 100%    Weight: 238 lb (108 kg)      Height: 5' 4\" (1.626 m)          Medications Prior to Admission     Prior to Admission medications    Not on File       Physical Exam: Need 8 Elements   Physical Exam     General: NAD, AAO x3-4  Eyes: EOMI  HENT: Atraumatic  CVS: Normal S1 and S2, no murmurs, RRR  Respiratory: Normal breath sounds, clear to auscultation bilaterally, no respiratory distress  GI: Normal bowel sounds, soft, nondistended, and tenderness in epigastric area  MSK: Left lower extremity nonpitting edema with significant effusion of the left knee with tenderness, right leg is normal  Skin: Intact, dry, warm, no rashes  Neuro: CN II to XII grossly intact  Psych: Normal mood      Past Medical History:   PMHx   Past Medical History:   Diagnosis Date    Arthritis     Depression     Disease of blood and blood forming organ     Hypertension     Immune deficiency disorder (HCC)     Kidney stone     Se[facility] ([facility] [address]     Thyroid disease      PSHX:  has a past surgical history that includes Abdomen surgery; Appendectomy; Cholecystectomy; Hysterectomy; hernia repair; Tonsillectomy; and vascular surgery. Allergies:    Allergies   Allergen Reactions    Asa [Aspirin]     Compazine [Prochlorperazine]     Reglan [Metoclopramide]     Toradol [Ketorolac Tromethamine]      Fam HX: Mother with diabetes, hypertension, obesity, arthritis, heart disease  Father with diabetes, hypertension, asthma, small prostate cancer family history is not on file. Soc HX:   Social History     Socioeconomic History    Marital status:      Spouse name: None    Number of children: None    Years of education: None    Highest education level: None   Tobacco Use    Smoking status: Never   Substance and Sexual Activity    Alcohol use: Never    Drug use: Never    Sexual activity: Not Currently       Medications:   Medications:    [START ON 9/20/2022] enoxaparin  1 mg/kg SubCUTAneous BID      Infusions:   PRN Meds:      Labs      CBC:   Recent Labs     09/19/22  1348   WBC 4.8   HGB 10.6*        BMP:    Recent Labs     09/19/22  1348      K 4.1      CO2 23   BUN 21   CREATININE 0.7   GLUCOSE 91     Hepatic:   Recent Labs     09/19/22  1348   AST 20   ALT 17   BILITOT 0.2   ALKPHOS 129     Lipids: No results found for: CHOL, HDL, TRIG  Hemoglobin A1C: No results found for: LABA1C  TSH: No results found for: TSH  Troponin: No results found for: TROPONINT  Lactic Acid: No results for input(s): LACTA in the last 72 hours. BNP: No results for input(s): PROBNP in the last 72 hours.   UA:  Lab Results   Component Value Date/Time    NITRU NEGATIVE 09/19/2022 01:25 PM    COLORU YELLOW 09/19/2022 01:25 PM    WBCUA 34 09/19/2022 01:25 PM    RBCUA NONE SEEN 09/19/2022 01:25 PM    MUCUS RARE 09/19/2022 01:25 PM    TRICHOMONAS NONE SEEN 09/19/2022 01:25 PM    BACTERIA RARE 09/19/2022 01:25 PM    CLARITYU SLIGHTLY CLOUDY 09/19/2022 01:25 PM    SPECGRAV 1.025 09/19/2022 01:25 PM    LEUKOCYTESUR MODERATE 09/19/2022 01:25 PM    UROBILINOGEN 0.2 09/19/2022 01:25 PM    BILIRUBINUR NEGATIVE 09/19/2022 01:25 PM    BLOODU NEGATIVE 09/19/2022 01:25 PM    KETUA NEGATIVE 09/19/2022 01:25 PM     Urine Cultures: No results found for: LABURIN  Blood Cultures: No results found for: BC  No results found for: BLOODCULT2  Organism: No results found for: ORG    Imaging/Diagnostics Last 24 Hours   CT ABDOMEN PELVIS W IV CONTRAST Additional Contrast? None    Result Date: 9/19/2022  EXAMINATION: CT OF THE ABDOMEN AND PELVIS WITH CONTRAST 9/19/2022 3:03 pm TECHNIQUE: CT of the abdomen and pelvis was performed with the administration of intravenous contrast. Multiplanar reformatted images are provided for review. Automated exposure control, iterative reconstruction, and/or weight based adjustment of the mA/kV was utilized to reduce the radiation dose to as low as reasonably achievable. COMPARISON: 06/23/2022 HISTORY: ORDERING SYSTEM PROVIDED HISTORY: epigastric pain, recent pneumobilia in CBD on recent scan TECHNOLOGIST PROVIDED HISTORY: Reason for exam:->epigastric pain, recent pneumobilia in CBD on recent scan Additional Contrast?->None Decision Support Exception - unselect if not a suspected or confirmed emergency medical condition->Emergency Medical Condition (MA) Reason for Exam: epigastric pain, recent pneumobilia in CBD on recent scan FINDINGS: Lower Chest: There are a few stable scattered 3-4 mm solid nodules in the lung bases (images 38 and 31). Mild bibasilar atelectasis. Organs: The liver, spleen, pancreas and adrenal glands are without focal abnormality. Status post cholecystectomy. Mild pneumobilia. 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 Punctate nonobstructing left renal calculus. The kidneys otherwise enhance symmetrically. No hydronephrosis or perinephric stranding. No renal or ureteral calculus is otherwise noted. GI/Bowel: Status post Jet-en-Y gastric bypass. The gastric pouch is distended with ingested contents. No dilated loops of bowel or bowel wall thickening is otherwise noted. No free air. The appendix is not visualized. Pelvis: Status post hysterectomy. The bladder is not well-distended.   No pathologically enlarged adenopathy or free fluid. Peritoneum/Retroperitoneum: The aorta is normal in caliber. The celiac axis, SMA and CORI are patent. The portal venous system is patent. Subcentimeter mesenteric and retroperitoneal lymph nodes, nonspecific. No pathologically enlarged adenopathy. No ascites or drainable fluid collection. Bones/Soft Tissues: No acute findings in the bones or soft tissues. 1. Status post Jet-en-Y gastric bypass. Gastric pouch is mildly distended with ingested contents but is otherwise nonspecific. This appears slightly more distended when compared to recent CTs and could be related to recent meal. The esophagus and Jet limb is normal in caliber. 2. Stable pneumobilia. 3. No other acute abdominal or pelvic abnormality. 4. Punctate nonobstructing left renal calculus. US EXTREMITY LEFT NON VASC LIMITED    Result Date: 9/19/2022  EXAMINATION: DUPLEX VENOUS ULTRASOUND OF THE LEFT LOWER EXTREMITY 9/19/2022 3:13 pm TECHNIQUE: Duplex ultrasound using B-mode/gray scaled imaging and Doppler spectral analysis and color flow was obtained of the deep venous structures of the left extremity. COMPARISON: None. HISTORY: ORDERING SYSTEM PROVIDED HISTORY: lower leg pain ,ro dvt TECHNOLOGIST PROVIDED HISTORY: Reason for exam:->lower leg pain ,ro dvt FINDINGS: Duplex ultrasound: There is nonocclusive echogenic thrombus in the distal femoral and popliteal veins. The remaining visualized veins of the left lower extremity are patent and free of echogenic thrombus and demonstrate good compressibility with normal color flow study and spectral analysis. Soft tissue ultrasound: Irregularly shaped anechoic fluid collection in the suprapatellar soft tissues measures 7.5 x 1.7 x 5.9 cm.     1. Nonocclusive thrombus in the left distal femoral and popliteal veins. 2. Anechoic suprapatellar fluid collection may represent joint effusion or fluid within the prepatellar bursa.  Findings were discussed with Bekah Padilla at 4:34 pm on 9/19/2022. VL DUP LOWER EXTREMITY VENOUS LEFT    Result Date: 9/19/2022  EXAMINATION: DUPLEX VENOUS ULTRASOUND OF THE LEFT LOWER EXTREMITY 9/19/2022 3:13 pm TECHNIQUE: Duplex ultrasound using B-mode/gray scaled imaging and Doppler spectral analysis and color flow was obtained of the deep venous structures of the left extremity. COMPARISON: None. HISTORY: ORDERING SYSTEM PROVIDED HISTORY: lower leg pain ,ro dvt TECHNOLOGIST PROVIDED HISTORY: Reason for exam:->lower leg pain ,ro dvt FINDINGS: Duplex ultrasound: There is nonocclusive echogenic thrombus in the distal femoral and popliteal veins. The remaining visualized veins of the left lower extremity are patent and free of echogenic thrombus and demonstrate good compressibility with normal color flow study and spectral analysis. Soft tissue ultrasound: Irregularly shaped anechoic fluid collection in the suprapatellar soft tissues measures 7.5 x 1.7 x 5.9 cm.     1. Nonocclusive thrombus in the left distal femoral and popliteal veins. 2. Anechoic suprapatellar fluid collection may represent joint effusion or fluid within the prepatellar bursa. Findings were discussed with CHARLIE Gordon at 4:34 pm on 9/19/2022.        Electronically signed by Yue Gan MD on 9/19/2022 at 6:13 PM

## 2022-09-20 ENCOUNTER — ANESTHESIA EVENT (OUTPATIENT)
Dept: OPERATING ROOM | Age: 52
DRG: 486 | End: 2022-09-20
Payer: COMMERCIAL

## 2022-09-20 ENCOUNTER — ANESTHESIA (OUTPATIENT)
Dept: OPERATING ROOM | Age: 52
DRG: 486 | End: 2022-09-20
Payer: COMMERCIAL

## 2022-09-20 DIAGNOSIS — K21.9 GASTROESOPHAGEAL REFLUX DISEASE WITHOUT ESOPHAGITIS: ICD-10-CM

## 2022-09-20 LAB
ALBUMIN SERPL-MCNC: 3.6 GM/DL (ref 3.4–5)
ALP BLD-CCNC: 116 IU/L (ref 40–128)
ALT SERPL-CCNC: 16 U/L (ref 10–40)
ANION GAP SERPL CALCULATED.3IONS-SCNC: 9 MMOL/L (ref 4–16)
AST SERPL-CCNC: 22 IU/L (ref 15–37)
BASOPHILS ABSOLUTE: 0 K/CU MM
BASOPHILS RELATIVE PERCENT: 0.8 % (ref 0–1)
BILIRUB SERPL-MCNC: 0.2 MG/DL (ref 0–1)
BUN BLDV-MCNC: 13 MG/DL (ref 6–23)
CALCIUM SERPL-MCNC: 9.4 MG/DL (ref 8.3–10.6)
CHLORIDE BLD-SCNC: 108 MMOL/L (ref 99–110)
CO2: 23 MMOL/L (ref 21–32)
CREAT SERPL-MCNC: 0.6 MG/DL (ref 0.6–1.1)
CRYSTALS, FLUID: NORMAL
DIFFERENTIAL TYPE: ABNORMAL
EOSINOPHILS ABSOLUTE: 0.2 K/CU MM
EOSINOPHILS RELATIVE PERCENT: 4.1 % (ref 0–3)
FLUID TYPE: NORMAL INDEX
GFR AFRICAN AMERICAN: >60 ML/MIN/1.73M2
GFR NON-AFRICAN AMERICAN: >60 ML/MIN/1.73M2
GLUCOSE BLD-MCNC: 126 MG/DL (ref 70–99)
HCT VFR BLD CALC: 33 % (ref 37–47)
HEMOGLOBIN: 9.8 GM/DL (ref 12.5–16)
IMMATURE NEUTROPHIL %: 0 % (ref 0–0.43)
LYMPHOCYTES ABSOLUTE: 1.3 K/CU MM
LYMPHOCYTES RELATIVE PERCENT: 32.5 % (ref 24–44)
LYMPHOCYTES, BODY FLUID: 10 %
MCH RBC QN AUTO: 26.6 PG (ref 27–31)
MCHC RBC AUTO-ENTMCNC: 29.7 % (ref 32–36)
MCV RBC AUTO: 89.4 FL (ref 78–100)
MESOTHELIAL FLUID: 0 /100 WBC
MONOCYTE, FLUID: 0 %
MONOCYTES ABSOLUTE: 0.3 K/CU MM
MONOCYTES RELATIVE PERCENT: 7.2 % (ref 0–4)
NEUTROPHIL, FLUID: 90 %
NUCLEATED RBC %: 0 %
OTHER CELLS FLUID: 0
PDW BLD-RTO: 13.1 % (ref 11.7–14.9)
PLATELET # BLD: 170 K/CU MM (ref 140–440)
PMV BLD AUTO: 10 FL (ref 7.5–11.1)
POTASSIUM SERPL-SCNC: 3.8 MMOL/L (ref 3.5–5.1)
RBC # BLD: 3.69 M/CU MM (ref 4.2–5.4)
RBC FLUID: NORMAL /CU MM
SEGMENTED NEUTROPHILS ABSOLUTE COUNT: 2.2 K/CU MM
SEGMENTED NEUTROPHILS RELATIVE PERCENT: 55.4 % (ref 36–66)
SODIUM BLD-SCNC: 140 MMOL/L (ref 135–145)
TOTAL IMMATURE NEUTOROPHIL: 0 K/CU MM
TOTAL NUCLEATED RBC: 0 K/CU MM
TOTAL PROTEIN: 5.5 GM/DL (ref 6.4–8.2)
WBC # BLD: 3.9 K/CU MM (ref 4–10.5)
WBC FLUID: NORMAL /CU MM

## 2022-09-20 PROCEDURE — 6360000002 HC RX W HCPCS: Performed by: HOSPITALIST

## 2022-09-20 PROCEDURE — 3700000001 HC ADD 15 MINUTES (ANESTHESIA): Performed by: STUDENT IN AN ORGANIZED HEALTH CARE EDUCATION/TRAINING PROGRAM

## 2022-09-20 PROCEDURE — 2580000003 HC RX 258: Performed by: STUDENT IN AN ORGANIZED HEALTH CARE EDUCATION/TRAINING PROGRAM

## 2022-09-20 PROCEDURE — 6360000002 HC RX W HCPCS: Performed by: INTERNAL MEDICINE

## 2022-09-20 PROCEDURE — 80053 COMPREHEN METABOLIC PANEL: CPT

## 2022-09-20 PROCEDURE — 3700000000 HC ANESTHESIA ATTENDED CARE: Performed by: STUDENT IN AN ORGANIZED HEALTH CARE EDUCATION/TRAINING PROGRAM

## 2022-09-20 PROCEDURE — 6360000002 HC RX W HCPCS: Performed by: NURSE ANESTHETIST, CERTIFIED REGISTERED

## 2022-09-20 PROCEDURE — 29871 ARTHRS KNEE SURG FOR INFCTJ: CPT | Performed by: STUDENT IN AN ORGANIZED HEALTH CARE EDUCATION/TRAINING PROGRAM

## 2022-09-20 PROCEDURE — 7100000001 HC PACU RECOVERY - ADDTL 15 MIN: Performed by: STUDENT IN AN ORGANIZED HEALTH CARE EDUCATION/TRAINING PROGRAM

## 2022-09-20 PROCEDURE — 6360000002 HC RX W HCPCS: Performed by: STUDENT IN AN ORGANIZED HEALTH CARE EDUCATION/TRAINING PROGRAM

## 2022-09-20 PROCEDURE — 87205 SMEAR GRAM STAIN: CPT

## 2022-09-20 PROCEDURE — 94761 N-INVAS EAR/PLS OXIMETRY MLT: CPT

## 2022-09-20 PROCEDURE — 29881 ARTHRS KNE SRG MNISECTMY M/L: CPT | Performed by: STUDENT IN AN ORGANIZED HEALTH CARE EDUCATION/TRAINING PROGRAM

## 2022-09-20 PROCEDURE — 6360000002 HC RX W HCPCS: Performed by: ANESTHESIOLOGY

## 2022-09-20 PROCEDURE — 0S9D40Z DRAINAGE OF LEFT KNEE JOINT WITH DRAINAGE DEVICE, PERCUTANEOUS ENDOSCOPIC APPROACH: ICD-10-PCS | Performed by: STUDENT IN AN ORGANIZED HEALTH CARE EDUCATION/TRAINING PROGRAM

## 2022-09-20 PROCEDURE — 2720000010 HC SURG SUPPLY STERILE: Performed by: STUDENT IN AN ORGANIZED HEALTH CARE EDUCATION/TRAINING PROGRAM

## 2022-09-20 PROCEDURE — 36592 COLLECT BLOOD FROM PICC: CPT

## 2022-09-20 PROCEDURE — 2700000000 HC OXYGEN THERAPY PER DAY

## 2022-09-20 PROCEDURE — 1200000000 HC SEMI PRIVATE

## 2022-09-20 PROCEDURE — 0SBD4ZZ EXCISION OF LEFT KNEE JOINT, PERCUTANEOUS ENDOSCOPIC APPROACH: ICD-10-PCS | Performed by: STUDENT IN AN ORGANIZED HEALTH CARE EDUCATION/TRAINING PROGRAM

## 2022-09-20 PROCEDURE — 3600000004 HC SURGERY LEVEL 4 BASE: Performed by: STUDENT IN AN ORGANIZED HEALTH CARE EDUCATION/TRAINING PROGRAM

## 2022-09-20 PROCEDURE — 6370000000 HC RX 637 (ALT 250 FOR IP): Performed by: INTERNAL MEDICINE

## 2022-09-20 PROCEDURE — 2580000003 HC RX 258: Performed by: INTERNAL MEDICINE

## 2022-09-20 PROCEDURE — 3E1U48X IRRIGATION OF JOINTS USING IRRIGATING SUBSTANCE, PERCUTANEOUS ENDOSCOPIC APPROACH, DIAGNOSTIC: ICD-10-PCS | Performed by: STUDENT IN AN ORGANIZED HEALTH CARE EDUCATION/TRAINING PROGRAM

## 2022-09-20 PROCEDURE — 2709999900 HC NON-CHARGEABLE SUPPLY: Performed by: STUDENT IN AN ORGANIZED HEALTH CARE EDUCATION/TRAINING PROGRAM

## 2022-09-20 PROCEDURE — 87070 CULTURE OTHR SPECIMN AEROBIC: CPT

## 2022-09-20 PROCEDURE — 87077 CULTURE AEROBIC IDENTIFY: CPT

## 2022-09-20 PROCEDURE — 7100000000 HC PACU RECOVERY - FIRST 15 MIN: Performed by: STUDENT IN AN ORGANIZED HEALTH CARE EDUCATION/TRAINING PROGRAM

## 2022-09-20 PROCEDURE — 3600000014 HC SURGERY LEVEL 4 ADDTL 15MIN: Performed by: STUDENT IN AN ORGANIZED HEALTH CARE EDUCATION/TRAINING PROGRAM

## 2022-09-20 PROCEDURE — 29875 ARTHRS KNEE SURG SYNVCT LMTD: CPT | Performed by: STUDENT IN AN ORGANIZED HEALTH CARE EDUCATION/TRAINING PROGRAM

## 2022-09-20 PROCEDURE — 6370000000 HC RX 637 (ALT 250 FOR IP): Performed by: STUDENT IN AN ORGANIZED HEALTH CARE EDUCATION/TRAINING PROGRAM

## 2022-09-20 PROCEDURE — 85025 COMPLETE CBC W/AUTO DIFF WBC: CPT

## 2022-09-20 RX ORDER — OXYCODONE HYDROCHLORIDE 5 MG/1
5 TABLET ORAL
Status: DISCONTINUED | OUTPATIENT
Start: 2022-09-20 | End: 2022-09-20 | Stop reason: HOSPADM

## 2022-09-20 RX ORDER — FENTANYL CITRATE 50 UG/ML
50 INJECTION, SOLUTION INTRAMUSCULAR; INTRAVENOUS EVERY 5 MIN PRN
Status: COMPLETED | OUTPATIENT
Start: 2022-09-20 | End: 2022-09-20

## 2022-09-20 RX ORDER — HYDROXYZINE 50 MG/1
50 TABLET, FILM COATED ORAL 2 TIMES DAILY
COMMUNITY

## 2022-09-20 RX ORDER — MELATONIN 10 MG
10 CAPSULE ORAL NIGHTLY
COMMUNITY

## 2022-09-20 RX ORDER — DEXTROSE MONOHYDRATE 100 MG/ML
INJECTION, SOLUTION INTRAVENOUS CONTINUOUS PRN
Status: CANCELLED | OUTPATIENT
Start: 2022-09-20

## 2022-09-20 RX ORDER — SODIUM CHLORIDE 0.9 % (FLUSH) 0.9 %
5-40 SYRINGE (ML) INJECTION PRN
Status: CANCELLED | OUTPATIENT
Start: 2022-09-20

## 2022-09-20 RX ORDER — MEPERIDINE HYDROCHLORIDE 25 MG/ML
12.5 INJECTION INTRAMUSCULAR; INTRAVENOUS; SUBCUTANEOUS ONCE
Status: CANCELLED | OUTPATIENT
Start: 2022-09-20 | End: 2022-09-20

## 2022-09-20 RX ORDER — FENTANYL CITRATE 50 UG/ML
INJECTION, SOLUTION INTRAMUSCULAR; INTRAVENOUS PRN
Status: DISCONTINUED | OUTPATIENT
Start: 2022-09-20 | End: 2022-09-20 | Stop reason: SDUPTHER

## 2022-09-20 RX ORDER — ONDANSETRON 2 MG/ML
4 INJECTION INTRAMUSCULAR; INTRAVENOUS
Status: CANCELLED | OUTPATIENT
Start: 2022-09-20 | End: 2022-09-20

## 2022-09-20 RX ORDER — LABETALOL HYDROCHLORIDE 5 MG/ML
10 INJECTION, SOLUTION INTRAVENOUS
Status: DISCONTINUED | OUTPATIENT
Start: 2022-09-20 | End: 2022-09-20 | Stop reason: HOSPADM

## 2022-09-20 RX ORDER — DIPHENHYDRAMINE HYDROCHLORIDE 50 MG/ML
12.5 INJECTION INTRAMUSCULAR; INTRAVENOUS
Status: CANCELLED | OUTPATIENT
Start: 2022-09-20 | End: 2022-09-20

## 2022-09-20 RX ORDER — IPRATROPIUM BROMIDE AND ALBUTEROL SULFATE 2.5; .5 MG/3ML; MG/3ML
1 SOLUTION RESPIRATORY (INHALATION)
Status: DISCONTINUED | OUTPATIENT
Start: 2022-09-20 | End: 2022-09-20 | Stop reason: HOSPADM

## 2022-09-20 RX ORDER — SODIUM CHLORIDE 0.9 % (FLUSH) 0.9 %
5-40 SYRINGE (ML) INJECTION PRN
Status: DISCONTINUED | OUTPATIENT
Start: 2022-09-20 | End: 2022-09-20 | Stop reason: HOSPADM

## 2022-09-20 RX ORDER — PROPOFOL 10 MG/ML
INJECTION, EMULSION INTRAVENOUS PRN
Status: DISCONTINUED | OUTPATIENT
Start: 2022-09-20 | End: 2022-09-20 | Stop reason: SDUPTHER

## 2022-09-20 RX ORDER — SODIUM CHLORIDE, SODIUM LACTATE, POTASSIUM CHLORIDE, CALCIUM CHLORIDE 600; 310; 30; 20 MG/100ML; MG/100ML; MG/100ML; MG/100ML
INJECTION, SOLUTION INTRAVENOUS CONTINUOUS
Status: CANCELLED | OUTPATIENT
Start: 2022-09-20

## 2022-09-20 RX ORDER — LIDOCAINE HYDROCHLORIDE 20 MG/ML
INJECTION, SOLUTION INTRAVENOUS PRN
Status: DISCONTINUED | OUTPATIENT
Start: 2022-09-20 | End: 2022-09-20 | Stop reason: SDUPTHER

## 2022-09-20 RX ORDER — ACETAMINOPHEN 500 MG
1000 TABLET ORAL ONCE
Status: DISCONTINUED | OUTPATIENT
Start: 2022-09-20 | End: 2022-09-20 | Stop reason: HOSPADM

## 2022-09-20 RX ORDER — SODIUM CHLORIDE, SODIUM LACTATE, POTASSIUM CHLORIDE, CALCIUM CHLORIDE 600; 310; 30; 20 MG/100ML; MG/100ML; MG/100ML; MG/100ML
INJECTION, SOLUTION INTRAVENOUS CONTINUOUS
Status: DISCONTINUED | OUTPATIENT
Start: 2022-09-20 | End: 2022-09-20 | Stop reason: HOSPADM

## 2022-09-20 RX ORDER — MEPERIDINE HYDROCHLORIDE 25 MG/ML
6.25 INJECTION INTRAMUSCULAR; INTRAVENOUS; SUBCUTANEOUS EVERY 5 MIN PRN
Status: DISCONTINUED | OUTPATIENT
Start: 2022-09-20 | End: 2022-09-20 | Stop reason: HOSPADM

## 2022-09-20 RX ORDER — CEFAZOLIN SODIUM 1 G/3ML
INJECTION, POWDER, FOR SOLUTION INTRAMUSCULAR; INTRAVENOUS PRN
Status: DISCONTINUED | OUTPATIENT
Start: 2022-09-20 | End: 2022-09-20 | Stop reason: SDUPTHER

## 2022-09-20 RX ORDER — GABAPENTIN 800 MG/1
800 TABLET ORAL 3 TIMES DAILY
COMMUNITY

## 2022-09-20 RX ORDER — SODIUM CHLORIDE 0.9 % (FLUSH) 0.9 %
5-40 SYRINGE (ML) INJECTION EVERY 12 HOURS SCHEDULED
Status: DISCONTINUED | OUTPATIENT
Start: 2022-09-20 | End: 2022-10-05 | Stop reason: HOSPADM

## 2022-09-20 RX ORDER — ESTRADIOL 0.5 MG/1
0.5 TABLET ORAL DAILY
COMMUNITY

## 2022-09-20 RX ORDER — ONDANSETRON 2 MG/ML
INJECTION INTRAMUSCULAR; INTRAVENOUS PRN
Status: DISCONTINUED | OUTPATIENT
Start: 2022-09-20 | End: 2022-09-20 | Stop reason: SDUPTHER

## 2022-09-20 RX ORDER — ONDANSETRON 4 MG/1
4 TABLET, FILM COATED ORAL AS NEEDED
Status: ON HOLD | COMMUNITY
End: 2022-10-03 | Stop reason: HOSPADM

## 2022-09-20 RX ORDER — SODIUM CHLORIDE 9 MG/ML
INJECTION, SOLUTION INTRAVENOUS PRN
Status: DISCONTINUED | OUTPATIENT
Start: 2022-09-20 | End: 2022-10-05 | Stop reason: HOSPADM

## 2022-09-20 RX ORDER — OXYCODONE HYDROCHLORIDE 5 MG/1
5 TABLET ORAL EVERY 4 HOURS PRN
Status: DISCONTINUED | OUTPATIENT
Start: 2022-09-20 | End: 2022-10-01

## 2022-09-20 RX ORDER — OXYCODONE HYDROCHLORIDE AND ACETAMINOPHEN 5; 325 MG/1; MG/1
1 TABLET ORAL EVERY 4 HOURS PRN
Status: ON HOLD | COMMUNITY
End: 2022-10-03 | Stop reason: SDUPTHER

## 2022-09-20 RX ORDER — LORAZEPAM 2 MG/ML
0.5 INJECTION INTRAMUSCULAR
Status: CANCELLED | OUTPATIENT
Start: 2022-09-20 | End: 2022-09-20

## 2022-09-20 RX ORDER — TIZANIDINE 4 MG/1
4 TABLET ORAL AS NEEDED
COMMUNITY

## 2022-09-20 RX ORDER — SODIUM CHLORIDE, SODIUM LACTATE, POTASSIUM CHLORIDE, CALCIUM CHLORIDE 600; 310; 30; 20 MG/100ML; MG/100ML; MG/100ML; MG/100ML
INJECTION, SOLUTION INTRAVENOUS CONTINUOUS
Status: DISCONTINUED | OUTPATIENT
Start: 2022-09-20 | End: 2022-09-25

## 2022-09-20 RX ORDER — PROCHLORPERAZINE EDISYLATE 5 MG/ML
5 INJECTION INTRAMUSCULAR; INTRAVENOUS
Status: CANCELLED | OUTPATIENT
Start: 2022-09-20 | End: 2022-09-20

## 2022-09-20 RX ORDER — SODIUM CHLORIDE 0.9 % (FLUSH) 0.9 %
5-40 SYRINGE (ML) INJECTION EVERY 12 HOURS SCHEDULED
Status: DISCONTINUED | OUTPATIENT
Start: 2022-09-20 | End: 2022-09-20 | Stop reason: HOSPADM

## 2022-09-20 RX ORDER — SODIUM CHLORIDE 0.9 % (FLUSH) 0.9 %
5-40 SYRINGE (ML) INJECTION PRN
Status: DISCONTINUED | OUTPATIENT
Start: 2022-09-20 | End: 2022-10-05 | Stop reason: HOSPADM

## 2022-09-20 RX ORDER — HYDRALAZINE HYDROCHLORIDE 20 MG/ML
10 INJECTION INTRAMUSCULAR; INTRAVENOUS
Status: CANCELLED | OUTPATIENT
Start: 2022-09-20

## 2022-09-20 RX ORDER — SODIUM CHLORIDE 0.9 % (FLUSH) 0.9 %
5-40 SYRINGE (ML) INJECTION EVERY 12 HOURS SCHEDULED
Status: CANCELLED | OUTPATIENT
Start: 2022-09-20

## 2022-09-20 RX ORDER — OXYCODONE HYDROCHLORIDE 5 MG/1
5 TABLET ORAL
Status: CANCELLED | OUTPATIENT
Start: 2022-09-20 | End: 2022-09-20

## 2022-09-20 RX ORDER — SODIUM CHLORIDE 9 MG/ML
INJECTION, SOLUTION INTRAVENOUS PRN
Status: DISCONTINUED | OUTPATIENT
Start: 2022-09-20 | End: 2022-09-20 | Stop reason: HOSPADM

## 2022-09-20 RX ORDER — SODIUM CHLORIDE 9 MG/ML
25 INJECTION, SOLUTION INTRAVENOUS PRN
Status: CANCELLED | OUTPATIENT
Start: 2022-09-20

## 2022-09-20 RX ORDER — OXYCODONE HYDROCHLORIDE 10 MG/1
10 TABLET ORAL EVERY 4 HOURS PRN
Status: DISCONTINUED | OUTPATIENT
Start: 2022-09-20 | End: 2022-10-01

## 2022-09-20 RX ORDER — DEXAMETHASONE SODIUM PHOSPHATE 4 MG/ML
INJECTION, SOLUTION INTRA-ARTICULAR; INTRALESIONAL; INTRAMUSCULAR; INTRAVENOUS; SOFT TISSUE PRN
Status: DISCONTINUED | OUTPATIENT
Start: 2022-09-20 | End: 2022-09-20 | Stop reason: SDUPTHER

## 2022-09-20 RX ORDER — IPRATROPIUM BROMIDE AND ALBUTEROL SULFATE 2.5; .5 MG/3ML; MG/3ML
1 SOLUTION RESPIRATORY (INHALATION)
Status: CANCELLED | OUTPATIENT
Start: 2022-09-20 | End: 2022-09-20

## 2022-09-20 RX ORDER — PROMETHAZINE HYDROCHLORIDE 25 MG/1
25 TABLET ORAL AS NEEDED
Status: ON HOLD | COMMUNITY
End: 2022-10-03 | Stop reason: HOSPADM

## 2022-09-20 RX ORDER — FENTANYL CITRATE 50 UG/ML
50 INJECTION, SOLUTION INTRAMUSCULAR; INTRAVENOUS EVERY 5 MIN PRN
Status: CANCELLED | OUTPATIENT
Start: 2022-09-20

## 2022-09-20 RX ORDER — DIPHENHYDRAMINE HYDROCHLORIDE 50 MG/ML
12.5 INJECTION INTRAMUSCULAR; INTRAVENOUS
Status: DISCONTINUED | OUTPATIENT
Start: 2022-09-20 | End: 2022-09-20 | Stop reason: HOSPADM

## 2022-09-20 RX ORDER — LEVOTHYROXINE SODIUM 0.12 MG/1
125 TABLET ORAL DAILY
COMMUNITY
End: 2022-10-25

## 2022-09-20 RX ORDER — PANTOPRAZOLE SODIUM 40 MG/1
TABLET, DELAYED RELEASE ORAL
Qty: 180 TABLET | Refills: 0 | Status: SHIPPED | OUTPATIENT
Start: 2022-09-20

## 2022-09-20 RX ORDER — SCOLOPAMINE TRANSDERMAL SYSTEM 1 MG/1
1 PATCH, EXTENDED RELEASE TRANSDERMAL
COMMUNITY
End: 2022-10-25 | Stop reason: SDUPTHER

## 2022-09-20 RX ORDER — MIDAZOLAM HYDROCHLORIDE 2 MG/2ML
2 INJECTION, SOLUTION INTRAMUSCULAR; INTRAVENOUS
Status: DISCONTINUED | OUTPATIENT
Start: 2022-09-20 | End: 2022-09-20 | Stop reason: HOSPADM

## 2022-09-20 RX ORDER — HYDRALAZINE HYDROCHLORIDE 20 MG/ML
10 INJECTION INTRAMUSCULAR; INTRAVENOUS
Status: DISCONTINUED | OUTPATIENT
Start: 2022-09-20 | End: 2022-09-20 | Stop reason: HOSPADM

## 2022-09-20 RX ORDER — CHOLECALCIFEROL (VITAMIN D3) 125 MCG
500 CAPSULE ORAL DAILY
COMMUNITY

## 2022-09-20 RX ORDER — LEVETIRACETAM 1000 MG/1
1000 TABLET ORAL 2 TIMES DAILY
COMMUNITY
End: 2022-10-25

## 2022-09-20 RX ADMIN — HYDROMORPHONE HYDROCHLORIDE 0.5 MG: 1 INJECTION, SOLUTION INTRAMUSCULAR; INTRAVENOUS; SUBCUTANEOUS at 20:16

## 2022-09-20 RX ADMIN — FENTANYL CITRATE 25 MCG: 50 INJECTION, SOLUTION INTRAMUSCULAR; INTRAVENOUS at 08:16

## 2022-09-20 RX ADMIN — ONDANSETRON 4 MG: 2 INJECTION INTRAMUSCULAR; INTRAVENOUS at 17:59

## 2022-09-20 RX ADMIN — FENTANYL CITRATE 25 MCG: 50 INJECTION, SOLUTION INTRAMUSCULAR; INTRAVENOUS at 08:32

## 2022-09-20 RX ADMIN — FENTANYL CITRATE 50 MCG: 50 INJECTION, SOLUTION INTRAMUSCULAR; INTRAVENOUS at 09:49

## 2022-09-20 RX ADMIN — ONDANSETRON 4 MG: 2 INJECTION INTRAMUSCULAR; INTRAVENOUS at 08:03

## 2022-09-20 RX ADMIN — PROPOFOL 150 MG: 10 INJECTION, EMULSION INTRAVENOUS at 07:59

## 2022-09-20 RX ADMIN — HYDROMORPHONE HYDROCHLORIDE 0.5 MG: 1 INJECTION, SOLUTION INTRAMUSCULAR; INTRAVENOUS; SUBCUTANEOUS at 02:18

## 2022-09-20 RX ADMIN — OXYCODONE AND ACETAMINOPHEN 1 TABLET: 5; 325 TABLET ORAL at 05:22

## 2022-09-20 RX ADMIN — CEFAZOLIN 2000 MG: 1 INJECTION, POWDER, FOR SOLUTION INTRAMUSCULAR; INTRAVENOUS; PARENTERAL at 08:42

## 2022-09-20 RX ADMIN — PANTOPRAZOLE SODIUM 40 MG: 40 TABLET, DELAYED RELEASE ORAL at 16:26

## 2022-09-20 RX ADMIN — SODIUM CHLORIDE, PRESERVATIVE FREE 10 ML: 5 INJECTION INTRAVENOUS at 20:27

## 2022-09-20 RX ADMIN — HYDROXYZINE HYDROCHLORIDE 50 MG: 50 TABLET, FILM COATED ORAL at 11:59

## 2022-09-20 RX ADMIN — HYDROXYZINE HYDROCHLORIDE 50 MG: 50 TABLET, FILM COATED ORAL at 20:16

## 2022-09-20 RX ADMIN — HYDROMORPHONE HYDROCHLORIDE 0.5 MG: 1 INJECTION, SOLUTION INTRAMUSCULAR; INTRAVENOUS; SUBCUTANEOUS at 14:53

## 2022-09-20 RX ADMIN — HYDROMORPHONE HYDROCHLORIDE 0.5 MG: 1 INJECTION, SOLUTION INTRAMUSCULAR; INTRAVENOUS; SUBCUTANEOUS at 23:32

## 2022-09-20 RX ADMIN — SODIUM CHLORIDE, POTASSIUM CHLORIDE, SODIUM LACTATE AND CALCIUM CHLORIDE: 600; 310; 30; 20 INJECTION, SOLUTION INTRAVENOUS at 20:26

## 2022-09-20 RX ADMIN — SODIUM CHLORIDE, POTASSIUM CHLORIDE, SODIUM LACTATE AND CALCIUM CHLORIDE: 600; 310; 30; 20 INJECTION, SOLUTION INTRAVENOUS at 16:25

## 2022-09-20 RX ADMIN — VANCOMYCIN HYDROCHLORIDE 1000 MG: 1 INJECTION, POWDER, LYOPHILIZED, FOR SOLUTION INTRAVENOUS at 11:53

## 2022-09-20 RX ADMIN — FENTANYL CITRATE 25 MCG: 50 INJECTION, SOLUTION INTRAMUSCULAR; INTRAVENOUS at 09:20

## 2022-09-20 RX ADMIN — PROMETHAZINE HYDROCHLORIDE 25 MG: 25 INJECTION INTRAMUSCULAR; INTRAVENOUS at 21:30

## 2022-09-20 RX ADMIN — LEVETIRACETAM 1000 MG: 500 TABLET, FILM COATED ORAL at 20:16

## 2022-09-20 RX ADMIN — LEVETIRACETAM 1000 MG: 500 TABLET, FILM COATED ORAL at 11:59

## 2022-09-20 RX ADMIN — FENTANYL CITRATE 50 MCG: 50 INJECTION, SOLUTION INTRAMUSCULAR; INTRAVENOUS at 08:47

## 2022-09-20 RX ADMIN — ONDANSETRON 4 MG: 2 INJECTION INTRAMUSCULAR; INTRAVENOUS at 23:32

## 2022-09-20 RX ADMIN — DEXAMETHASONE SODIUM PHOSPHATE 4 MG: 4 INJECTION, SOLUTION INTRAMUSCULAR; INTRAVENOUS at 08:03

## 2022-09-20 RX ADMIN — ONDANSETRON 4 MG: 2 INJECTION INTRAMUSCULAR; INTRAVENOUS at 11:49

## 2022-09-20 RX ADMIN — FENTANYL CITRATE 25 MCG: 50 INJECTION, SOLUTION INTRAMUSCULAR; INTRAVENOUS at 09:03

## 2022-09-20 RX ADMIN — VANCOMYCIN HYDROCHLORIDE 1000 MG: 1 INJECTION, POWDER, LYOPHILIZED, FOR SOLUTION INTRAVENOUS at 20:27

## 2022-09-20 RX ADMIN — FENTANYL CITRATE 50 MCG: 50 INJECTION, SOLUTION INTRAMUSCULAR; INTRAVENOUS at 09:57

## 2022-09-20 RX ADMIN — ONDANSETRON 4 MG: 2 INJECTION INTRAMUSCULAR; INTRAVENOUS at 06:43

## 2022-09-20 RX ADMIN — PAROXETINE HYDROCHLORIDE 30 MG: 20 TABLET, FILM COATED ORAL at 11:51

## 2022-09-20 RX ADMIN — SODIUM CHLORIDE: 9 INJECTION, SOLUTION INTRAVENOUS at 07:35

## 2022-09-20 RX ADMIN — CEFAZOLIN 2000 MG: 2 INJECTION, POWDER, FOR SOLUTION INTRAMUSCULAR; INTRAVENOUS at 23:36

## 2022-09-20 RX ADMIN — HYDROMORPHONE HYDROCHLORIDE 0.5 MG: 1 INJECTION, SOLUTION INTRAMUSCULAR; INTRAVENOUS; SUBCUTANEOUS at 17:04

## 2022-09-20 RX ADMIN — CEFAZOLIN 2000 MG: 2 INJECTION, POWDER, FOR SOLUTION INTRAMUSCULAR; INTRAVENOUS at 16:31

## 2022-09-20 RX ADMIN — HYDROMORPHONE HYDROCHLORIDE 0.5 MG: 1 INJECTION, SOLUTION INTRAMUSCULAR; INTRAVENOUS; SUBCUTANEOUS at 12:16

## 2022-09-20 RX ADMIN — LIDOCAINE HYDROCHLORIDE 100 MG: 20 INJECTION, SOLUTION INTRAVENOUS at 07:59

## 2022-09-20 RX ADMIN — OXYCODONE HYDROCHLORIDE 10 MG: 10 TABLET ORAL at 21:30

## 2022-09-20 RX ADMIN — ONDANSETRON 4 MG: 2 INJECTION INTRAMUSCULAR; INTRAVENOUS at 00:18

## 2022-09-20 RX ADMIN — FENTANYL CITRATE 50 MCG: 50 INJECTION, SOLUTION INTRAMUSCULAR; INTRAVENOUS at 08:02

## 2022-09-20 RX ADMIN — HYDROMORPHONE HYDROCHLORIDE 0.5 MG: 1 INJECTION, SOLUTION INTRAMUSCULAR; INTRAVENOUS; SUBCUTANEOUS at 06:43

## 2022-09-20 RX ADMIN — HYDROMORPHONE HYDROCHLORIDE 0.5 MG: 1 INJECTION, SOLUTION INTRAMUSCULAR; INTRAVENOUS; SUBCUTANEOUS at 10:06

## 2022-09-20 RX ADMIN — ENOXAPARIN SODIUM 105 MG: 150 INJECTION SUBCUTANEOUS at 20:16

## 2022-09-20 ASSESSMENT — PAIN SCALES - GENERAL
PAINLEVEL_OUTOF10: 7
PAINLEVEL_OUTOF10: 10
PAINLEVEL_OUTOF10: 9
PAINLEVEL_OUTOF10: 10
PAINLEVEL_OUTOF10: 7
PAINLEVEL_OUTOF10: 6
PAINLEVEL_OUTOF10: 10
PAINLEVEL_OUTOF10: 5
PAINLEVEL_OUTOF10: 6

## 2022-09-20 ASSESSMENT — PAIN DESCRIPTION - LOCATION
LOCATION: LEG;FOOT
LOCATION: KNEE
LOCATION: LEG;KNEE
LOCATION: LEG
LOCATION: LEG;FOOT
LOCATION: KNEE
LOCATION: LEG;KNEE
LOCATION: KNEE
LOCATION: KNEE
LOCATION: LEG;FOOT

## 2022-09-20 ASSESSMENT — PAIN DESCRIPTION - PAIN TYPE
TYPE: ACUTE PAIN

## 2022-09-20 ASSESSMENT — PAIN DESCRIPTION - FREQUENCY
FREQUENCY: CONTINUOUS

## 2022-09-20 ASSESSMENT — PAIN DESCRIPTION - ORIENTATION
ORIENTATION: LEFT
ORIENTATION: LEFT;LOWER
ORIENTATION: LEFT
ORIENTATION: LEFT;UPPER
ORIENTATION: LEFT
ORIENTATION: LEFT

## 2022-09-20 ASSESSMENT — PAIN - FUNCTIONAL ASSESSMENT
PAIN_FUNCTIONAL_ASSESSMENT: 0-10
PAIN_FUNCTIONAL_ASSESSMENT: PREVENTS OR INTERFERES SOME ACTIVE ACTIVITIES AND ADLS
PAIN_FUNCTIONAL_ASSESSMENT: PREVENTS OR INTERFERES SOME ACTIVE ACTIVITIES AND ADLS

## 2022-09-20 ASSESSMENT — PAIN DESCRIPTION - DESCRIPTORS
DESCRIPTORS: CRUSHING;JABBING
DESCRIPTORS: PRESSURE;TIGHTNESS
DESCRIPTORS: ACHING;SHARP;SHOOTING
DESCRIPTORS: STABBING;THROBBING
DESCRIPTORS: STABBING;THROBBING
DESCRIPTORS: PRESSURE
DESCRIPTORS: ACHING;SHARP
DESCRIPTORS: BURNING
DESCRIPTORS: ACHING;CRAMPING;BURNING

## 2022-09-20 NOTE — PROGRESS NOTES
[address] [facility]  consulted by Dr. Thee Honeycutt for monitoring and adjustment. Indication for treatment: bone and joint infection  Goal trough: [] 10-15 mcg/mL or [x] 15-20 mcg/ml  AUC/GLORIA: [] <500 or [x] 400-600    Pertinent Laboratory Values:   Temp Readings from Last 3 Encounters:   09/20/22 98.2 °F (36.8 °C)     Recent Labs     09/19/22  1348 09/20/22  0600   WBC 4.8 3.9*   LACTATE 0.5  --        Recent Labs     09/19/22  1348 09/20/22  0600   BUN 21 13   CREATININE 0.7 0.6       Estimated Creatinine Clearance: 130 mL/min (based on SCr of 0.6 mg/dL). Intake/Output Summary (Last 24 hours) at 9/20/2022 1607  Last data filed at 9/20/2022 5106  Gross per 24 hour   Intake 525.68 ml   Output 75 ml   Net 450.68 ml       Pertinent Cultures:  Date    Source    Results  9/19                             Synovial fluid                         In process        Vancomycin level:   TROUGH:  No results for input(s): VANCOTROUGH in the last 72 hours. RANDOM:  No results for input(s): VANCORANDOM in the last 72 hours. Assessment:  SCr, BUN, and urine output: SCR remains close to baseline, limited UOP data  LEFT KNEE ARTHROSCOPIC IRRIGATION AND DEBRIDEMENT WITH DRAIN PLACEMENT (performed by surgery today  Day(s) of therapy: 2  Vancomycin concentration:   9/21 - random level at 06:00    Plan:  Renal trends remain close to baseline. Continue vancomycin 1000mg Q12h with a predicted AUC of 415 at steady state  Check the vanco level tomorrow am  Pharmacy will continue to monitor patient and adjust therapy as indicated    Apoorva 3 09/21 @0600    Thank you for the consult. [facility]  9/20/2022 4:07 PM

## 2022-09-20 NOTE — ANESTHESIA PRE PROCEDURE
Department of Anesthesiology  Preprocedure Note       Name:  Franki Lomeli   Age:  46 y.o.  :  1970                                          MRN:  [id number]         Date:  2022      Surgeon: Jessie Mohs):  Zana Neal DO    Procedure: Procedure(s):  LEFT KNEE ARTHROSCOPIC IRRIGATION AND DEBRIDEMENT WITH DRAIN PLACEMENT    Medications prior to admission:   Prior to Admission medications    Not on File       Current medications:    Current Facility-Administered Medications   Medication Dose Route Frequency Provider Last Rate Last Admin    HYDROmorphone (DILAUDID) injection 0.5 mg  0.5 mg IntraVENous Once Bruna Dapilah, APRN - CNP        meperidine (DEMEROL) injection 6.25 mg  6.25 mg IntraVENous Q5 Min PRN Wava Handing, DO        HYDROmorphone (DILAUDID) injection 0.5 mg  0.5 mg IntraVENous Q5 Min PRN Wava Handing, DO        fentaNYL (SUBLIMAZE) injection 50 mcg  50 mcg IntraVENous Q5 Min PRN Wava Handing, DO   50 mcg at 22 0528    oxyCODONE (ROXICODONE) immediate release tablet 5 mg  5 mg Oral Once PRN Wava Handing, DO        midazolam PF (VERSED) injection 2 mg  2 mg IntraVENous Once PRN Wava Handing, DO        diphenhydrAMINE (BENADRYL) injection 12.5 mg  12.5 mg IntraVENous Once PRN Wava Handing, DO        labetalol (NORMODYNE;TRANDATE) injection 10 mg  10 mg IntraVENous Q15 Min PRN Wava Handing, DO        Or    hydrALAZINE (APRESOLINE) injection 10 mg  10 mg IntraVENous Q15 Min PRN Wava Handing, DO        ipratropium-albuterol (DUONEB) nebulizer solution 1 ampule  1 ampule Inhalation Once PRN Wava Handing, DO        sodium chloride flush 0.9 % injection 5-40 mL  5-40 mL IntraVENous 2 times per day Mona Pepper MD        sodium chloride flush 0.9 % injection 5-40 mL  5-40 mL IntraVENous PRN Mona Pepper MD        0.9 % sodium chloride infusion   IntraVENous PRN Mona Pepper  mL/hr at 22 0758 [facility] at 09/20/22 0752    ondansetron (ZOFRAN-ODT) disintegrating tablet 4 mg  4 mg Oral Q8H PRN Nataly Martin MD        Or    ondansetron (ZOFRAN) injection 4 mg  4 mg IntraVENous Q6H PRN Nataly Martin MD        polyethylene glycol (GLYCOLAX) packet 17 g  17 g Oral Daily PRN Nataly Martin MD        acetaminophen (TYLENOL) tablet 650 mg  650 mg Oral Q6H PRN Nataly Martin MD        Or    acetaminophen (TYLENOL) suppository 650 mg  650 mg Rectal Q6H PRN Nataly Martin MD        enoxaparin (LOVENOX) injection 105 mg  1 mg/kg SubCUTAneous BID Nataly Martin MD        ondansetron (ZOFRAN) injection 4 mg  4 mg IntraVENous 4 times per day Nataly Martin MD   4 mg at 09/20/22 0643    promethazine (PHENERGAN) injection 50 mg  50 mg IntraMUSCular Q6H PRN Nataly Martin MD        vancomycin (VANCOCIN) 1,000 mg in dextrose 5 % 250 mL IVPB ([id number])  1,000 mg IntraVENous Q12H Naatly Martin MD        PARoxetine (PAXIL) tablet 30 mg  30 mg Oral Daily Ligia Fine MD        hydrOXYzine HCl (ATARAX) tablet 50 mg  50 mg Oral BID Nataly Martin MD   50 mg at 09/19/22 2158    levothyroxine (SYNTHROID) tablet 125 mcg  125 mcg Oral Daily Nataly Martin MD        levETIRAcetam (KEPPRA) tablet 1,000 mg  1,000 mg Oral BID Nataly Martin MD        pantoprazole (PROTONIX) tablet 40 mg  40 mg Oral BID AC Ligia Fine MD        oxyCODONE-acetaminophen (PERCOCET) 5-325 MG per tablet 1 tablet  1 tablet Oral Q4H PRN Nataly Martin MD   1 tablet at 09/20/22 0522    HYDROmorphone (DILAUDID) injection 0.5 mg  0.5 mg IntraVENous Q4H PRN Nataly Martin MD   0.5 mg at 09/20/22 0643    promethazine (PHENERGAN) injection 25 mg  25 mg IntraMUSCular Q6H PRN Nataly Martin MD           Allergies:     Allergies   Allergen Reactions    Haldol [Haloperidol] Other (See Comments)     States \"shook severely and had to have Benadryl\"    Asa [Aspirin]     Compazine [Prochlorperazine]     Reglan [Metoclopramide]     Toradol [Ketorolac Tromethamine]        Problem List:    Patient Active Problem List   Diagnosis Code    Acute bilateral deep vein thrombosis (DVT) of femoral veins ([facility]) I82.413       Past Medical History:        Diagnosis Date    Arthritis     Depression     Disease of blood and blood forming organ     Hypertension     Immune deficiency disorder ([facility] [address]     Kidney stone     Seizures ([facility] [address]     Thyroid disease        Past Surgical History:        Procedure Laterality Date    ABDOMEN SURGERY      APPENDECTOMY      CHOLECYSTECTOMY      HERNIA REPAIR      HYSTERECTOMY (CERVIX STATUS UNKNOWN)      TONSILLECTOMY      VASCULAR SURGERY         Social History:    Social History     Tobacco Use    Smoking status: Never    Smokeless tobacco: Not on file   Substance Use Topics    Alcohol use: Never                                Counseling given: Not Answered      Vital Signs (Current):   Vitals:    09/20/22 0923 09/20/22 0925 09/20/22 0935 09/20/22 0940   BP:  (!) 145/123 (!) 179/153 (!) 127/100   Pulse: 74 77 74 70   Resp:       Temp:       TempSrc:       SpO2:  (!) 74% 100% 100%   Weight:       Height:                                                  BP Readings from Last 3 Encounters:   09/20/22 (!) 127/100       NPO Status: Time of last liquid consumption: 0530                        Time of last solid consumption: 1200                        Date of last liquid consumption: 09/20/22                        Date of last solid food consumption: 09/19/22    BMI:   Wt Readings from Last 3 Encounters:   09/19/22 238 lb (108 kg)     Body mass index is 40.85 kg/m².     CBC:   Lab Results   Component Value Date/Time    WBC 3.9 09/20/2022 06:00 AM    RBC 3.69 09/20/2022 06:00 AM    HGB 9.8 09/20/2022 06:00 AM    HCT 33.0 09/20/2022 06:00 AM    MCV 89.4 09/20/2022 06:00 AM    RDW 13.1 09/20/2022 06:00 AM     09/20/2022 06:00 AM       CMP:   Lab Results   Component Value Date/Time     09/20/2022 06:00 AM    K 3.8 09/20/2022 06:00 AM     09/20/2022 06:00 AM    CO2 23 09/20/2022 06:00 AM    BUN 13 09/20/2022 06:00 AM    CREATININE 0.6 09/20/2022 06:00 AM    GFRAA >60 09/20/2022 06:00 AM    LABGLOM >60 09/20/2022 06:00 AM    GLUCOSE 126 09/20/2022 06:00 AM    PROT 5.5 09/20/2022 06:00 AM    CALCIUM 9.4 09/20/2022 06:00 AM    BILITOT 0.2 09/20/2022 06:00 AM    ALKPHOS 116 09/20/2022 06:00 AM    AST 22 09/20/2022 06:00 AM    ALT 16 09/20/2022 06:00 AM       POC Tests: No results for input(s): POCGLU, POCNA, POCK, POCCL, POCBUN, POCHEMO, POCHCT in the last 72 hours. Coags: No results found for: PROTIME, INR, APTT    HCG (If Applicable): No results found for: PREGTESTUR, PREGSERUM, HCG, HCGQUANT     ABGs: No results found for: PHART, PO2ART, ADP4MHH, PUW7SDQ, BEART, [id number]     Type & Screen (If Applicable):  No results found for: LABABO, LABRH    Drug/Infectious Status (If Applicable):  No results found for: HIV, HEPCAB    COVID-19 Screening (If Applicable): No results found for: COVID19        Anesthesia Evaluation    Airway: Mallampati: II  TM distance: >3 FB   Neck ROM: full  Mouth opening: > = 3 FB   Dental:          Pulmonary:normal exam                               Cardiovascular:    (+) hypertension:,                   Neuro/Psych:   (+) psychiatric history:            GI/Hepatic/Renal:             Endo/Other:                     Abdominal:             Vascular: Other Findings:           Anesthesia Plan      general     ASA 3       Induction: intravenous. MIPS: Postoperative opioids intended. Anesthetic plan and risks discussed with patient. Plan discussed with CRNA.     Attending anesthesiologist reviewed and agrees with Jean Overton MD   9/20/2022

## 2022-09-20 NOTE — OP NOTE
Operative Note      Patient: Jose Carrizales  YOB: 1970  MRN: [id number]    Date of Procedure: 9/20/2022    Pre-Op Diagnosis: Left knee septic effusion    Post-Op Diagnosis: Same       Procedure(s):  LEFT KNEE ARTHROSCOPIC IRRIGATION AND DEBRIDEMENT WITH DRAIN PLACEMENT    Surgeon(s):  Honey Haque DO    Assistant:   * No surgical staff found *    Anesthesia: General    Estimated Blood Loss (mL): less than 779     Complications: None    Specimens:   ID Type Source Tests Collected by Time Destination   1 : Left Knee Fluid Culture Swab #1 Body Fluid Fluid GRAM STAIN, CULTURE, BODY FLUID Honey Haque, DO 9/20/2022 5800    2 : Left Knee Fluid Culture Swab #2 Body Fluid Fluid GRAM STAIN, CULTURE, BODY FLUID Carolanniland Basket, DO 9/20/2022 0821        Implants:  * No implants in log *      Drains: * No LDAs found *    Findings: Please see dictated op report    Detailed Description of Procedure:   DATE OF PROCEDURE:      PREOPERATIVE DIAGNOSIS:  Left knee arthroscopic irrigation debridement with drain placement    POSTOPERATIVE DIAGNOSES:  1. Left knee septic arthritis      PROCEDURES:  1. Diagnostic and operative arthroscopy of Left knee with irrigation and debridement  2. Placement of Left knee drain    ATTENDING SURGEON:  Honey Haque DO    ANESTHESIA:  General.    ESTIMATED BLOOD LOSS:  50 mL. TOTAL TOURNIQUET TIME:  43 minutes. FLUIDS:  400 mL of crystalloids. INDICATIONS FOR PROCEDURE:  The patient is a 46year-old female who was brought to the emergency room yesterday with nausea and vomiting and there was concern for left knee septic effusion due to swelling about the knee. Upon evaluating the patient myself, I had mild concern for septic arthritis based on physical exam alone as well as her lab work including no increased white count on CBC at that time.   However, with her history of infection in the left foot as well as her other medical comorbidities I obtained an aspiration which demonstrated 30 cc of blood-tinged minimally turbulent straw colored fluid that was only mildly concerning for infection. Results demonstrated 73,000 WBCs as well PMNs >90% which met criteria for septic arthritis. Given the patient's symptoms and laboratory results of the left knee aspiration, I recommended surgical treatment. I explained the risks, benefits and possible complications of the procedure to the patient and after answering all of their questions, they consented to undergo the above  procedure. REPORT OF PROCEDURE:  The patient was seen and evaluated in the  preoperative holding area where the left lower extremity was signed in their  presence. At this point, care of the patient was turned over to anesthesia  team, and they were transported back to the operative suite. They were placed supine on  the operating table with the left lower extremity in leg sanchez. General  anesthesia was applied and once adequate anesthesia was obtained, the left  lower extremity was prepped and draped in usual sterile fashion. Preoperative antibiotics were administered. At this point, a time-out was  performed and all in attendance were in agreement. Antibiotics were held at this time. I exsanguinated the left lower extremity using Esmarch and tourniquet was  inflated to 300 mmHg. I then made a standard anterolateral  portal incision using a 15 blade scalpel and inserted arthroscope into the knee  joint through the anterolateral portal.  An abundant amount of minimally blood-tinged straw-colored, minimally nonturbulent fluid was collected at this time and sent for gram stain, culture, sensitivities. Antibiotic was then given at this time. At this point, I performed a standard diagnostic arthroscopy evaluating the patellofemoral compartment and the medial gutter. No loose bodies were seen.   I then took the arthroscopy camera to the medial compartment and established my medial portal utilizing a spinal needle, determining the placement of my anteromedial portal  in a standard fashion above the medial meniscus. I then established the medial portal with the 15 blade scalpel. I then inserted the arthroscopic shaver. A mild synovectomy at the medial gutter as well of the synovitic tissue around the medial joint line was performed. Within the medial compartment, I found the medial meniscus to have degenerative type of   tear through the body to the posterior horn of the medial meniscus. I then used the arthroscopic   biters to debride the degenerative tear. I then used the arthroscopic shaver to remove  all loose meniscus debris. Upon probing, the medial meniscus was stable after meniscectomy was performed. Upon examination of the cartilage, there was diffuse grade 1 through 3 changes at both the tibia and femoral condyle with a small area of the cartilage flap of the medial portion of the femoral condyle. I performed a slight chondroplasty to this area to remove the flap. The remaining cartilage was probed and there were no unstable cartilage flaps once chondroplasty was completed. I then examined the femoral notch. The ligamentum mucosum was taken down using the arthroscopic shaver. No loose bodies were seen. The ACL and PCL were found to be intact with probing. I performed a mild synovectomy anterior to the femoral notch at the fat pad and surrounding tissues. I then turned my attention to the lateral compartment of the knee. Within the lateral compartment, overall, I found the cartilage on the femoral condyle and tibial  plateau to be intact. The lateral meniscus was probed and found to be  Intact with the exception of some degenerative fraying of the body and posterior horn of the lateral meniscus which was gently debrided utilizing the arthroscopic shaver. Upon probing, the lateral meniscus was stable after meniscectomy was performed.  I then examined the lateral gutter and found no loose bodies. I performed a limited synovectomy at this area and around the lateral joint line as well. I then returned to the patellofemoral compartment. Within the patellofemoral  compartment, I found the cartilage on the patella to be mostly intact but there was a midline area of grade 3 cartilage changes at the midline of the the femoral trochlea. I then performed a minimal chondroplasty at this area to remove any loose flaps and this again was probed and found to be stable once chondroplasty was completed. The patella was found to have normal tracking of the trochlea. A mild synovectomy was performed at this time and the suprapatellar portion of the knee. I then reevaluated the medial and lateral gutters of the knee and found there to be no additional pathology present. I again returned to the suprapatellar pouch and found no loose body or pathology other than some soft tissue debris. I then did additional synovectomy at the patellofemoral compartment as well as of the synovium around the medial and lateral femoral condyles, at one point switching my viewing and working portals to reach all synovial tissue as needed. I continued lavage to complete the irrigation portion with a total of 9 liters of normal saline. A Penrose drain was placed through the medial portal under direct visualization utilizing the arthroscope. The drain maintained its position after all arthroscopic instruments were removed. With all arthroscopic instrumentation removed from the knee, excess fluid  was then drained from the knee with gentle pressure at the suprapatellar pouch. Skin incisions were then closed using 3-0 nylon suture and care was taken to avoid sewing in the drain. The tourniquet was then deflated for a total of 43 minutes. Upon putting down the tourniquet, there was excess bleeding from the medial portal site that was dark venous appearing blood and was nonpulsatile.   We then placed gentle pressure on this area for approximately 3 minutes and then reassessed. No excessive bleeding was seen at that time. I then applied a sterile soft dressing to the left lower extremity. The patient was then awakened from anesthesia and transported to PACU in stable condition. The patient appeared to  have tolerated the procedure well. PROGNOSIS:  At this point, they will be transferred back to the floor for continued IV antibiotics and further medical treatment. They will be seen by occupational and physical therapy. They can have immediate weightbearing as tolerated, range of motion as tolerated on the  Left leg. I will evaluate the patient over the next several days to ensure there is no continued infection. I will also evaluate for removal of the Penrose drain , which will be performed when the patient is seen on postop day 2. I will see them back in the office in 1 week for reevaluation and to continue to monitor their progress in the outpatient setting for resolution of their symptoms.   They will also see me at 2 weeks postop for suture removal.      Juvencio Marie DO     Electronically signed by Juvencio Marie DO on 9/20/2022 at 9:02 AM

## 2022-09-20 NOTE — CONSULTS
Blood cultures drawn under ultrasound from 2 separate sites and sent to lab as ordered. Patient is in tremendous pain post-op, unable to troubleshoot port at this time. PAtient known to IV team for issues with port not returning blood. Will check back tomorrow to see if appropriate for further troubleshooting measures.

## 2022-09-20 NOTE — PROGRESS NOTES
V2.0  [facility] [place]      Name:  Martin Cazares /Age/Sex: 1970  (46 y.o. female)   MRN & CSN:  [id number] & [id number] Encounter Date/Time: 2022 6:16 PM EDT    Location:  [address] [place] [address] PCP: No primary care provider on file. Hospital Day: 2    Assessment and Plan:   Martin Cazares is a 46 y.o. female who presents with left knee septic joint      Plan:  Left knee effusion concern for septic joint-I&D . On Vanco and cefazolin. Cultures pending. Ortho following. CRP 6.5. Intractable nausea and vomiting, chronic pneumobilia-GI consulted. On antiemetics. Acute nonocclusive DVT of left distal femoral and popliteal veins-on therapeutic Lovenox. Anemia-hemoglobin 9.8. Seizure disorder  Hypertension  History of left foot infection/osteomyelitis    Diet ADULT DIET; Regular    DVT Prophylaxis [] Lovenox, []  Heparin, [] SCDs, [] Ambulation,  [] Eliquis, [] Xarelto  [] Coumadin   Code Status Full Code   Disposition From: home  Expected Disposition: TBD  Estimated Date of Discharge:   Patient requires continued admission due to swollen left knee joint         Subjective/Interval history:   Chief Complaint: No chief complaint on file. Patient returned from OR. She is complaining of having pain in her left knee. Review of Systems:    Negative unless mentioned above    Objective:      Intake/Output Summary (Last 24 hours) at 2022  Last data filed at 20223  Gross per 24 hour   Intake 525.68 ml   Output 75 ml   Net 450.68 ml        Vitals:   /77   Pulse 66   Temp 98.2 °F (36.8 °C)   Resp 14   Ht 5' 4\" (1.626 m)   Wt 238 lb (108 kg)   SpO2 92%   BMI 40.85 kg/m²     Physical Exam:   General: NAD, laying in bed  Eyes: no discharge  HENT: NCAT  Cardiovascular: RRR  Respiratory: Clear to auscultation b/l bs+  Gastrointestinal: Soft, non tender, nondistended  Genitourinary: no suprapubic tenderness  Musculoskeletal: left knee is wrapped  Skin:

## 2022-09-20 NOTE — PROGRESS NOTES
Orthopaedic Progress Note    Patient Name: Todd Oneal   ([id number])  MRN   [id number]   Today's date:  9/20/2022      Updated HPI: Patient seen and examined resting comfortably in bedside chair today. Patient states that she did get initial pain relief in her left knee after the aspiration was performed overnight the knee pain continue to worsen. No additional areas of orthopedic complaint at this time other than left knee pain and swelling. Continues with constitutional symptoms of nausea but denies any shortness of breath, chills. No additional complaints at this time. CHIEF COMPLAINT:   Left knee pain, effusion    HISTORY OF PRESENT ILLNESS:      The patient is a 46 y.o. female  who presented to the ER with this evening with nausea and vomiting as well as left knee swelling and pain. Patient states the left knee swelling began approximately 3 days ago and the swelling and pain has worsened. She denies any prior left knee pain or swelling. She denies any prior left knee issues. Patient does have a prior history of left great toe infection and has a history of approximately 7 surgeries over the last 8 years with a podiatrist for staph infection of the left foot. She had no other areas of infection previously. No recent injury to the left knee. No additional left knee complaints at this time including stiffness or sensation of instability when ambulating. Ultrasound performed earlier today of the left lower extremity did demonstrate a nonocclusive thrombus in the left distal femoral and popliteal veins. No imaging of the left knee was otherwise done. Patient denies any prior knee infection and has  not been on any recent antibiotics for the current symptoms. However, patient has received vancomycin as well as ceftriaxone in the emergency room prior to myself being contacted and being able to see the patient.     Patient admits to nausea and vomiting but denies  chest pain, SOB, fever, Normocephalic, no lesions, without obvious abnormality. Neck: supple, symmetrical, trachea midline    Extremities:    Left knee mildly tender to palpation at the joint line and suprapatellar region, unchanged from yesterday. Compressive soft dressing in place   -Mild knee effusion with mild warmth, no significant change from yesterday. No other concerning signs of infection including erythema, wound/laceration,  rash. Band-Aid over aspiration site in place   -Minimal pain with range of motion of the knee, and no pain with range of motion of the hip or ankle. Patient wiggles toes without difficulty. Patient does have previous fusion of the left great toe and is able to wiggle this but no pain with palpation or movement throughout the other toes. -Good distal pulses with good capillary refill   -sensation intact to light touch   -Skin intact with no laceration. Contralateral LE:    -no tenderness to palpation or pain with gentle AROM or PROM of the hip, knee or ankle   -skin intact, 2+ DP pulse, 5/5 strength globally    Bilateral UE's:   -No tenderness over shoulders, elbows, wrists, hands. Full painless AROM.  Skin intact, sens/motor function intact throughout    Neurologic: Mental status: Alert, oriented, thought content appropriate      Labs     CBC:   Recent Labs     09/19/22  1348 09/20/22  0600   WBC 4.8 3.9*   HGB 10.6* 9.8*    170       BMP:    Recent Labs     09/19/22  1348 09/20/22  0600    140   K 4.1 3.8    108   CO2 23 23   BUN 21 13   CREATININE 0.7 0.6   GLUCOSE 91 126*       INR:  No results found for: INR, PROTIME     Left knee aspiration cell count with differential from yesterday demonstrates:  Component Ref Range & Units 9/19/22 2000    Fluid Type INDEX SYNOVIAL FLUID    RBC, Fluid /CU ,000    WBC, Fluid /CU MM 73,100    Neutrophil Count, Fluid % 90    Lymphocytes, Body Fluid % 10    Monocyte Count, Fluid % 0    Mesothelial, Fluid /100 WBC 0    Other Cells, Fluid 0      No results in regards to crystals, cultures, gram stain      X-ray view   Imaging Review  2 views of the left knee in a skeletally mature patient demonstrates: There is no acute fracture or suspect osseous lesion. Mild medial   compartment joint space narrowing is present. Alignment is otherwise   unremarkable. No soft tissue swelling or joint effusion. Ultrasound extremity left nonvascular demonstrates:  1. Nonocclusive thrombus in the left distal femoral and popliteal veins. 2. Anechoic suprapatellar fluid collection may represent joint effusion or   fluid within the prepatellar bursa. Assessment and Plan     1. Left knee effusion, concern for septic joint    I had a very thorough conversation with the patient regarding her laboratory results as well as her treatment course. I explained that the white blood cell count of the fluid obtained from her knee yesterday does meet the criteria for her having a bacterial infection of the knee. I did explain that certain diseases can also cause this elevation and she does have lupus which may contribute. I also explained that the high amount of red blood cells in the aspiration can also contribute to the white blood cell count being elevated. I also explained that her white blood cell count and her blood is not concerning for infection and her ESR is normal.  However, her CRP is elevated and patient does state that with previous infections in the foot, her white blood cell count of her blood did not reflect an infectious process taking place and this was attributed to her lupus. Also, the neutrophil count being 90% is also indicative of infection and with her history of previous left foot infections, I do feel that further discussion of the doing an irrigation debridement is warranted. Patient states that she would rather be safe and more aggressive with surgical intervention to prevent any type of long-term issue in the knee.   I explained that I do feel that she meets criteria for irrigation debridement and although issue such as gout, which we do not have her crystal results yet but she has no history for, lupus, increased RBCs, and several other issues can cause the white blood cells to be elevated and suggest an infection when none is present, the risks with a arthroscopic irrigation debridement, in my opinion, are less problematic than missing a septic joint. I agree with her that it is appropriate to pursue surgical intervention at this time. I discussed with the patient continuing conservative measures including antibiotics and serial evaluations as well as possible serial aspirations versus operative intervention and what both treatment plans would encompass, as well as the benefits and negatives of both interventions. Due to the patient's significant symptoms and her history of previous infections, they would like to proceed with surgical intervention. I discussed surgical intervention in the form of:    Left knee arthroscopic irrigation debridement with drain placement    I explained risks, benefits, possible complications of the procedure and answered all questions for the patient. This will include but is not limited to need for further surgery, blood loss, continued pain and instability, neurovascular injury, continued infection, extremity loss, death. I explained postoperative rehabilitation protocol and expectations with the patient today. The patient understands and consents to the procedure.       Honey Haque, DO

## 2022-09-20 NOTE — CARE COORDINATION
CM into see pt to initiate a safe discharge plan. Cm introduced self and explained role of CM. Pt is sitting up in a chr.Pt is kind, alert and oriented. Pt lives with her mom in a one floor home plan. Pt has a PCP, dr Erin Ruggiero. Pt has insurance and able to obtain prescriptions. PT/OT ordered and CM team will follow with the recommendations. Discharge plan  pt is home with mom waiting for the recommendations PT/OT  CM provided card and encouraged to call for any needs or concern. CM is available if any needs arise.

## 2022-09-20 NOTE — PROGRESS NOTES
54 Pt arrived from OR to PACU. Monitors applied and alarms set. Report received from [facility]. Pt very anxious , fearful and crying of pain. Very difficult to console. Dr Shelah Najjar notified for need of orders. Dr Sly Tomlinson also. Ana Lilia Bel Medicated for pain and repeated as ordered for pain of 10. Pt repositioned and re medicated for pain of 6.  1025 Report called to . [facility]. Transport notified.

## 2022-09-20 NOTE — ANESTHESIA POSTPROCEDURE EVALUATION
Department of Anesthesiology  Postprocedure Note    Patient: Monique Rosa  MRN: [id number]  YOB: 1970  Date of evaluation: 9/20/2022      Procedure Summary     Date: 09/20/22 Room / Location: [address]    Anesthesia Start: 2296 Anesthesia Stop: 0254    Procedure: LEFT KNEE ARTHROSCOPIC IRRIGATION AND DEBRIDEMENT WITH DRAIN PLACEMENT (Left: Knee) Diagnosis:       Injury of left knee, subsequent encounter      (Injury of left knee, subsequent encounter [S89.92XD])    Surgeons: Nisa Heaton DO Responsible Provider: Amisha Iyer MD    Anesthesia Type: Not recorded ASA Status: Not recorded          Anesthesia Type: No value filed.     Anay Phase I: Anay Score: 10    Anay Phase II:        Anesthesia Post Evaluation    Patient location during evaluation: PACU  Patient participation: complete - patient participated  Level of consciousness: awake and alert  Pain score: 10  Airway patency: patent  Nausea & Vomiting: no vomiting and no nausea  Complications: no  Cardiovascular status: blood pressure returned to baseline and hemodynamically stable  Respiratory status: acceptable, spontaneous ventilation, nonlabored ventilation and nasal cannula  Hydration status: stable

## 2022-09-21 PROBLEM — M79.10 MYALGIA: Status: ACTIVE | Noted: 2022-09-21

## 2022-09-21 LAB
ALBUMIN SERPL-MCNC: 3.8 GM/DL (ref 3.4–5)
ALP BLD-CCNC: 114 IU/L (ref 40–129)
ALT SERPL-CCNC: 13 U/L (ref 10–40)
AMYLASE: 15 U/L (ref 25–115)
ANION GAP SERPL CALCULATED.3IONS-SCNC: 10 MMOL/L (ref 4–16)
APTT: 33.4 SECONDS (ref 25.1–37.1)
AST SERPL-CCNC: 23 IU/L (ref 15–37)
BASOPHILS ABSOLUTE: 0 K/CU MM
BASOPHILS RELATIVE PERCENT: 0.5 % (ref 0–1)
BILIRUB SERPL-MCNC: 0.4 MG/DL (ref 0–1)
BUN BLDV-MCNC: 9 MG/DL (ref 6–23)
CALCIUM SERPL-MCNC: 9.6 MG/DL (ref 8.3–10.6)
CHLORIDE BLD-SCNC: 100 MMOL/L (ref 99–110)
CO2: 22 MMOL/L (ref 21–32)
CREAT SERPL-MCNC: 0.7 MG/DL (ref 0.6–1.1)
DIFFERENTIAL TYPE: ABNORMAL
EKG ATRIAL RATE: 57 BPM
EKG DIAGNOSIS: NORMAL
EKG P AXIS: 49 DEGREES
EKG P-R INTERVAL: 190 MS
EKG Q-T INTERVAL: 414 MS
EKG QRS DURATION: 90 MS
EKG QTC CALCULATION (BAZETT): 402 MS
EKG R AXIS: -15 DEGREES
EKG T AXIS: 30 DEGREES
EKG VENTRICULAR RATE: 57 BPM
EOSINOPHILS ABSOLUTE: 0.2 K/CU MM
EOSINOPHILS RELATIVE PERCENT: 4.9 % (ref 0–3)
GFR AFRICAN AMERICAN: >60 ML/MIN/1.73M2
GFR NON-AFRICAN AMERICAN: >60 ML/MIN/1.73M2
GLUCOSE BLD-MCNC: 112 MG/DL (ref 70–99)
HCT VFR BLD CALC: 28.8 % (ref 37–47)
HEMOGLOBIN: 9.2 GM/DL (ref 12.5–16)
IMMATURE NEUTROPHIL %: 0.2 % (ref 0–0.43)
INR BLD: 0.92 INDEX
LIPASE: 12 IU/L (ref 13–60)
LYMPHOCYTES ABSOLUTE: 0.8 K/CU MM
LYMPHOCYTES RELATIVE PERCENT: 19.4 % (ref 24–44)
MCH RBC QN AUTO: 27.1 PG (ref 27–31)
MCHC RBC AUTO-ENTMCNC: 31.9 % (ref 32–36)
MCV RBC AUTO: 85 FL (ref 78–100)
MONOCYTES ABSOLUTE: 0.5 K/CU MM
MONOCYTES RELATIVE PERCENT: 11.6 % (ref 0–4)
NUCLEATED RBC %: 0 %
PDW BLD-RTO: 13.2 % (ref 11.7–14.9)
PLATELET # BLD: 161 K/CU MM (ref 140–440)
PMV BLD AUTO: 10.2 FL (ref 7.5–11.1)
POTASSIUM SERPL-SCNC: 3.2 MMOL/L (ref 3.5–5.1)
PROTHROMBIN TIME: 11.9 SECONDS (ref 11.7–14.5)
RBC # BLD: 3.39 M/CU MM (ref 4.2–5.4)
SEGMENTED NEUTROPHILS ABSOLUTE COUNT: 2.7 K/CU MM
SEGMENTED NEUTROPHILS RELATIVE PERCENT: 63.4 % (ref 36–66)
SODIUM BLD-SCNC: 132 MMOL/L (ref 135–145)
TOTAL IMMATURE NEUTOROPHIL: 0.01 K/CU MM
TOTAL NUCLEATED RBC: 0 K/CU MM
TOTAL PROTEIN: 6.5 GM/DL (ref 6.4–8.2)
WBC # BLD: 4.3 K/CU MM (ref 4–10.5)

## 2022-09-21 PROCEDURE — 93010 ELECTROCARDIOGRAM REPORT: CPT | Performed by: INTERNAL MEDICINE

## 2022-09-21 PROCEDURE — 97530 THERAPEUTIC ACTIVITIES: CPT

## 2022-09-21 PROCEDURE — 36415 COLL VENOUS BLD VENIPUNCTURE: CPT

## 2022-09-21 PROCEDURE — 94761 N-INVAS EAR/PLS OXIMETRY MLT: CPT

## 2022-09-21 PROCEDURE — 97166 OT EVAL MOD COMPLEX 45 MIN: CPT

## 2022-09-21 PROCEDURE — 1200000000 HC SEMI PRIVATE

## 2022-09-21 PROCEDURE — 6370000000 HC RX 637 (ALT 250 FOR IP): Performed by: NURSE PRACTITIONER

## 2022-09-21 PROCEDURE — 85025 COMPLETE CBC W/AUTO DIFF WBC: CPT

## 2022-09-21 PROCEDURE — 6370000000 HC RX 637 (ALT 250 FOR IP): Performed by: STUDENT IN AN ORGANIZED HEALTH CARE EDUCATION/TRAINING PROGRAM

## 2022-09-21 PROCEDURE — 80053 COMPREHEN METABOLIC PANEL: CPT

## 2022-09-21 PROCEDURE — 83690 ASSAY OF LIPASE: CPT

## 2022-09-21 PROCEDURE — 6370000000 HC RX 637 (ALT 250 FOR IP): Performed by: HOSPITALIST

## 2022-09-21 PROCEDURE — 82150 ASSAY OF AMYLASE: CPT

## 2022-09-21 PROCEDURE — 6360000002 HC RX W HCPCS: Performed by: STUDENT IN AN ORGANIZED HEALTH CARE EDUCATION/TRAINING PROGRAM

## 2022-09-21 PROCEDURE — 85730 THROMBOPLASTIN TIME PARTIAL: CPT

## 2022-09-21 PROCEDURE — 99024 POSTOP FOLLOW-UP VISIT: CPT | Performed by: STUDENT IN AN ORGANIZED HEALTH CARE EDUCATION/TRAINING PROGRAM

## 2022-09-21 PROCEDURE — 85610 PROTHROMBIN TIME: CPT

## 2022-09-21 PROCEDURE — 97162 PT EVAL MOD COMPLEX 30 MIN: CPT

## 2022-09-21 PROCEDURE — 97535 SELF CARE MNGMENT TRAINING: CPT

## 2022-09-21 PROCEDURE — 2580000003 HC RX 258: Performed by: STUDENT IN AN ORGANIZED HEALTH CARE EDUCATION/TRAINING PROGRAM

## 2022-09-21 RX ORDER — GABAPENTIN 400 MG/1
800 CAPSULE ORAL 3 TIMES DAILY
Status: DISCONTINUED | OUTPATIENT
Start: 2022-09-21 | End: 2022-10-05 | Stop reason: HOSPADM

## 2022-09-21 RX ORDER — DIPHENHYDRAMINE HCL 25 MG
50 TABLET ORAL ONCE
Status: COMPLETED | OUTPATIENT
Start: 2022-09-21 | End: 2022-09-21

## 2022-09-21 RX ORDER — LANOLIN ALCOHOL/MO/W.PET/CERES
500 CREAM (GRAM) TOPICAL DAILY
Status: DISCONTINUED | OUTPATIENT
Start: 2022-09-21 | End: 2022-10-05 | Stop reason: HOSPADM

## 2022-09-21 RX ORDER — VITAMIN B COMPLEX
1000 TABLET ORAL DAILY
Status: DISCONTINUED | OUTPATIENT
Start: 2022-09-21 | End: 2022-10-05 | Stop reason: HOSPADM

## 2022-09-21 RX ORDER — TIZANIDINE 4 MG/1
4 TABLET ORAL EVERY 8 HOURS PRN
Status: DISCONTINUED | OUTPATIENT
Start: 2022-09-21 | End: 2022-10-05 | Stop reason: HOSPADM

## 2022-09-21 RX ADMIN — PANTOPRAZOLE SODIUM 40 MG: 40 TABLET, DELAYED RELEASE ORAL at 17:52

## 2022-09-21 RX ADMIN — HYDROMORPHONE HYDROCHLORIDE 0.5 MG: 1 INJECTION, SOLUTION INTRAMUSCULAR; INTRAVENOUS; SUBCUTANEOUS at 09:39

## 2022-09-21 RX ADMIN — CYANOCOBALAMIN TAB 1000 MCG 500 MCG: 1000 TAB at 17:55

## 2022-09-21 RX ADMIN — SODIUM CHLORIDE, PRESERVATIVE FREE 10 ML: 5 INJECTION INTRAVENOUS at 09:41

## 2022-09-21 RX ADMIN — HYDROXYZINE HYDROCHLORIDE 50 MG: 50 TABLET, FILM COATED ORAL at 09:39

## 2022-09-21 RX ADMIN — ONDANSETRON 4 MG: 2 INJECTION INTRAMUSCULAR; INTRAVENOUS at 23:38

## 2022-09-21 RX ADMIN — HYDROMORPHONE HYDROCHLORIDE 0.5 MG: 1 INJECTION, SOLUTION INTRAMUSCULAR; INTRAVENOUS; SUBCUTANEOUS at 16:19

## 2022-09-21 RX ADMIN — SODIUM CHLORIDE, POTASSIUM CHLORIDE, SODIUM LACTATE AND CALCIUM CHLORIDE: 600; 310; 30; 20 INJECTION, SOLUTION INTRAVENOUS at 05:11

## 2022-09-21 RX ADMIN — LEVOTHYROXINE SODIUM 125 MCG: 25 TABLET ORAL at 05:07

## 2022-09-21 RX ADMIN — TIZANIDINE 4 MG: 4 TABLET ORAL at 12:43

## 2022-09-21 RX ADMIN — GABAPENTIN 800 MG: 400 CAPSULE ORAL at 12:43

## 2022-09-21 RX ADMIN — TIZANIDINE 4 MG: 4 TABLET ORAL at 20:37

## 2022-09-21 RX ADMIN — Medication 1000 UNITS: at 17:53

## 2022-09-21 RX ADMIN — ONDANSETRON 4 MG: 2 INJECTION INTRAMUSCULAR; INTRAVENOUS at 17:58

## 2022-09-21 RX ADMIN — OXYCODONE HYDROCHLORIDE 10 MG: 10 TABLET ORAL at 18:08

## 2022-09-21 RX ADMIN — HYDROXYZINE HYDROCHLORIDE 50 MG: 50 TABLET, FILM COATED ORAL at 20:35

## 2022-09-21 RX ADMIN — DIPHENHYDRAMINE HYDROCHLORIDE 50 MG: 25 TABLET ORAL at 01:52

## 2022-09-21 RX ADMIN — HYDROMORPHONE HYDROCHLORIDE 0.5 MG: 1 INJECTION, SOLUTION INTRAMUSCULAR; INTRAVENOUS; SUBCUTANEOUS at 20:35

## 2022-09-21 RX ADMIN — VANCOMYCIN HYDROCHLORIDE 1000 MG: 1 INJECTION, POWDER, LYOPHILIZED, FOR SOLUTION INTRAVENOUS at 10:00

## 2022-09-21 RX ADMIN — ONDANSETRON 4 MG: 2 INJECTION INTRAMUSCULAR; INTRAVENOUS at 12:44

## 2022-09-21 RX ADMIN — OXYCODONE AND ACETAMINOPHEN 1 TABLET: 5; 325 TABLET ORAL at 05:07

## 2022-09-21 RX ADMIN — HYDROMORPHONE HYDROCHLORIDE 0.5 MG: 1 INJECTION, SOLUTION INTRAMUSCULAR; INTRAVENOUS; SUBCUTANEOUS at 12:43

## 2022-09-21 RX ADMIN — HYDROMORPHONE HYDROCHLORIDE 0.5 MG: 1 INJECTION, SOLUTION INTRAMUSCULAR; INTRAVENOUS; SUBCUTANEOUS at 23:38

## 2022-09-21 RX ADMIN — ENOXAPARIN SODIUM 105 MG: 150 INJECTION SUBCUTANEOUS at 20:35

## 2022-09-21 RX ADMIN — GABAPENTIN 800 MG: 400 CAPSULE ORAL at 20:35

## 2022-09-21 RX ADMIN — ENOXAPARIN SODIUM 105 MG: 150 INJECTION SUBCUTANEOUS at 10:06

## 2022-09-21 RX ADMIN — LEVETIRACETAM 1000 MG: 500 TABLET, FILM COATED ORAL at 09:39

## 2022-09-21 RX ADMIN — PAROXETINE HYDROCHLORIDE 30 MG: 20 TABLET, FILM COATED ORAL at 09:39

## 2022-09-21 RX ADMIN — PROMETHAZINE HYDROCHLORIDE 25 MG: 25 INJECTION INTRAMUSCULAR; INTRAVENOUS at 10:09

## 2022-09-21 RX ADMIN — LEVETIRACETAM 1000 MG: 500 TABLET, FILM COATED ORAL at 20:35

## 2022-09-21 RX ADMIN — PANTOPRAZOLE SODIUM 40 MG: 40 TABLET, DELAYED RELEASE ORAL at 05:07

## 2022-09-21 RX ADMIN — HYDROMORPHONE HYDROCHLORIDE 0.5 MG: 1 INJECTION, SOLUTION INTRAMUSCULAR; INTRAVENOUS; SUBCUTANEOUS at 06:21

## 2022-09-21 RX ADMIN — HYDROMORPHONE HYDROCHLORIDE 0.5 MG: 1 INJECTION, SOLUTION INTRAMUSCULAR; INTRAVENOUS; SUBCUTANEOUS at 03:03

## 2022-09-21 RX ADMIN — ONDANSETRON 4 MG: 2 INJECTION INTRAMUSCULAR; INTRAVENOUS at 05:08

## 2022-09-21 RX ADMIN — VANCOMYCIN HYDROCHLORIDE 1000 MG: 1 INJECTION, POWDER, LYOPHILIZED, FOR SOLUTION INTRAVENOUS at 20:35

## 2022-09-21 ASSESSMENT — PAIN DESCRIPTION - ONSET: ONSET: ON-GOING

## 2022-09-21 ASSESSMENT — PAIN DESCRIPTION - ORIENTATION
ORIENTATION: LEFT

## 2022-09-21 ASSESSMENT — PAIN DESCRIPTION - LOCATION
LOCATION: KNEE;LEG
LOCATION: LEG
LOCATION: KNEE;LEG
LOCATION: KNEE

## 2022-09-21 ASSESSMENT — PAIN - FUNCTIONAL ASSESSMENT
PAIN_FUNCTIONAL_ASSESSMENT: PREVENTS OR INTERFERES WITH MANY ACTIVE NOT PASSIVE ACTIVITIES
PAIN_FUNCTIONAL_ASSESSMENT: PREVENTS OR INTERFERES WITH MANY ACTIVE NOT PASSIVE ACTIVITIES
PAIN_FUNCTIONAL_ASSESSMENT: PREVENTS OR INTERFERES SOME ACTIVE ACTIVITIES AND ADLS

## 2022-09-21 ASSESSMENT — ENCOUNTER SYMPTOMS
VOICE CHANGE: 0
SORE THROAT: 0
BACK PAIN: 0
VOMITING: 0
COLOR CHANGE: 0
NAUSEA: 0
SHORTNESS OF BREATH: 0
WHEEZING: 0
COUGH: 0

## 2022-09-21 ASSESSMENT — PAIN DESCRIPTION - FREQUENCY
FREQUENCY: CONTINUOUS
FREQUENCY: CONTINUOUS

## 2022-09-21 ASSESSMENT — PAIN SCALES - GENERAL
PAINLEVEL_OUTOF10: 10
PAINLEVEL_OUTOF10: 7
PAINLEVEL_OUTOF10: 10
PAINLEVEL_OUTOF10: 8
PAINLEVEL_OUTOF10: 10

## 2022-09-21 ASSESSMENT — PAIN DESCRIPTION - DESCRIPTORS
DESCRIPTORS: ACHING;BURNING
DESCRIPTORS: SHARP
DESCRIPTORS: ACHING
DESCRIPTORS: OTHER (COMMENT)
DESCRIPTORS: PRESSURE;BURNING

## 2022-09-21 ASSESSMENT — PAIN DESCRIPTION - PAIN TYPE
TYPE: ACUTE PAIN;SURGICAL PAIN
TYPE: ACUTE PAIN

## 2022-09-21 NOTE — CARE COORDINATION
Chart reviewed. DERICK spoke with the patient who states she has had 7 surgery's with her foot and staph. She states that she got up with therapy and walked in the halls. She states they re going to take the tube in her knee cap. She states she wants Dr. Taina Dolan to see her foot. The patient states that when they take the tube out of her knee, as long as she can get up she wants to go home. She asked to follow up with her tomorrow.

## 2022-09-21 NOTE — CONSULTS
[address] Yusuf, 1970, [id number], 9/21/2022      Discharge Recommendation: SNF v Home with Jace Fisher OT pending improvement d/t limitations of pain     History:  [place]:  The primary encounter diagnosis was Acute deep vein thrombosis (DVT) of popliteal vein of left lower extremity ([address] Diagnoses of Non-intractable vomiting with nausea, unspecified vomiting type, Generalized abdominal pain, and Injury of left knee, subsequent encounter were also pertinent to this visit. Pt  has a past medical history of Arthritis, Depression, Disease of blood and blood forming organ, Hypertension, Immune deficiency disorder ([address] Kidney stone, Seizures ([address] and Thyroid disease.     Subjective:  Patient states: \"I have had so many surgeries on my L leg, this is a lot\" \"I am hoping once they remove the tube I will move around a lot easier and be closer to my baseline\"   Pain: 7/10 (LLE)  Communication with other providers: RN handoff  Restrictions: general precautions, fall risk, contact ISO  No one at bedside    Home Setup/Prior level of function:  Social/Functional History  Lives With: Family (brothers following surgery)  Type of Home: House  Home Layout: One level  Home Access: Stairs to enter with rails  Entrance Stairs - Number of Steps: 2  Entrance Stairs - Rails: Left  Bathroom Shower/Tub: Tub/Shower unit  Bathroom Toilet: Standard  Home Equipment: Crutches  Has the patient had two or more falls in the past year or any fall with injury in the past year?: No  Receives Help From: Family  ADL Assistance: Independent  Homemaking Assistance: Independent  Homemaking Responsibilities: Yes  Ambulation Assistance: Independent  Transfer Assistance: Independent  Active : Yes  Occupation: On disability    Examination:  Observation: Pt was semifowlers upon arrival, agreeable to session  Vision: WFL  Hearing: WFL  Objective Measures: RA    Body Systems and functions:  ROM: WFL in BUE, with exception to L shoulder ~40degrees. Pt reported this to be recent and unsure of etiology. Strength: 4/5 in BUE, with exception to L shoulder 3/5. Sensation: WFL  Tone: normotonic in BUE  Coordination: movements fluid and coordinated  Posture: normal posture  Activity Tolerance: good     Activities of Daily Living (ADLs):  Feeding: SetupA   Grooming: SetupA   Toileting: CGA   UB dressing: SetupA   LB dressing: SetupA   UB bathing: Lillian   LB bathing: Lillian       *pt ADL function inferred from gross functional assessment of mobility, balance, posture, safety awareness, activity tolerance (unless otherwise indicated)    Cognitive and Psychosocial Functioning:  Overall cognitive status: WFL  Affect: normal     Balance:   Sitting: good, SBA   Standing: CGA-SBA     Functional Mobility:  Rolling Laterally in Bed: NT   Supine to Sit: SBA with inc time and use of bed rails   Sit to Stand: CGA     Ambulation: CGA-SBA using FWW      AM-PAC 6 click short form for inpatient daily activity:   How much help from another person does the patient currently need. .. Unable  Dep A Lot  Max A A Lot   Mod A A Little  Min A A Little   CGA  SBA None   Mod I  Indep  Sup   1. Putting on and taking off regular lower body clothing? [] 1    [] 2   [] 2   [] 3   [x] 3   [] 4      2. Bathing (including washing, rinsing, drying)? [] 1   [] 2   [] 2 [x] 3 [] 3 [] 4   3. Toileting, which includes using toilet, bedpan, or urinal? [] 1    [] 2   [] 2   [] 3   [x] 3   [] 4     4. Putting on and taking off regular upper body clothing? [] 1   [] 2   [] 2   [] 3   [x] 3    [] 4      5. Taking care of personal grooming such as brushing teeth? [] 1   [] 2    [] 2 [] 3    [x] 3   [] 4      6. Eating meals?    [] 1   [] 2   [] 2   [] 3   [x] 3   [] 4        Raw Score:  18      24/24 = unimpaired  23/24 = 1-20% impaired   20/24-22/24 = 21-40% impaired  15/24-19/24 = 41-59% impaired   10/24-14/24 = 60%-79% impaired  7/24-9/24 = 80%-99% impaired  6/24 = 100% impaired     Treatment:  Therapeutic Activity Training:   Therapeutic activity training was instructed today. Cues were given for safety, sequence, UE/LE placement, visual cues, and balance. Activities performed today included:    Bed mobility:  Pt completed sup to sit with SBA and inc time to complete with use of bed rails. Inc time d/t significant pain at rest and with movement. Pt completed scooting to reach EOB SBA. Pt tolerated EOB well without evidence of lean SBA. STS:  Pt completed <-> EOB CGA to FWW. Pt completed <-> commode with FWW and use of grab bars. Ambulation:  Pt ambulated shy of a functional household distance CGA with FWW. Pt required inc time and a standing rest break. Pt demo'd guarding of LLE d/t significant pain in LLE and heavy reliance on BUE. See PT anatoliyal for further details regarding ambulation. Standing:   Pt tolerated standing ~2 mins close SBA for donning/doffing/donning gown prior to ambulation in hallway. Pt tolerated standing sinkside ~2mins close SBA for hand hygiene. Return to bed:  Pt completed sit to sup SBA with inc time. Pt completed scooting in bed with use of bed rails and adjustment of HOB SBA. Self Care Training:   Self care training was performed today. Cues were given for safety, sequence, UE/LE placement, visual cues, and balance. Activities performed today included: Toileting: Pt completed over standard commode, See above for assist level for stand. Pt managed pants and cha care. Hand hygiene: Pt completed standing sinkside SBA with SetupA. UB dressing: Pt donned/ doffed gown SetupA for extra coverage. Pt doffed/donned personal Tshirt SetupA. LB dressing: Pt donned bialt nonslip socks seated EOB SetupA.        Education: Role of OT, OT POC, discharge needs, safety, benefits of EOB/OOB activity, AE needs, home safety, falls prevention, feet checks   Safety Measures: Gait belt used for safety of pt and therapist, Left in bed per pt request, Alarm in place, call light and phone within reach, lines managed, needs met     Assessment:  Pt is a 46 yr old female from home who presents with Acute deep vein thrombosis (DVT) of popliteal vein of left lower extremity ([facility] [address] Diagnoses of Non-intractable vomiting with nausea, unspecified vomiting type, Generalized abdominal pain, and Injury of left knee, subsequent encounter were also pertinent to this visit. LLE debridement pertinent to this stay. Prior to admission, pt was grossly independent in all ADLs/ IADLs. Pt currently appeared to be functioning below typical baseline d/t significant LLE pain and recent surgery. Pt presented with dec endurance, dec strength, intense pain, and CGA-SBA for all mobility d/t unsteadiness and pain. Pt may benefit dc home with Jace 78 OT pending improvement to ambulate a functional household distance with improved pain mgmt for addressing falls prevention d/t unable to have 24/7 Sup. Otherwise, SNF may be safest option. Will continue to monitor. Pt would benefit from continued IP OT services during their stay, and discharge to Home with Jace 78 OT pending improvement. Complexity: Moderate   Prognosis: Good   Barriers: L knee pain, strength, endurance     Plan:  Plan: 3+/week    Treatment to include: Strengthening, ROM, Balance Training, Functional Mobility Training, Endurance Training, Gait Training, Pain Management, Safety Education and Training, Patient+Caregiver Education and Training, Equipment Evaluation Education and Procurement, Positioning, Self Care Training, Home Management Training, Coordination Training    Pt would benefit from continued edu on: falls prevention   Adaptive Equipment Recommendations: Shower chair with back, grab bars in shower, grab bars near commode, ETS, and leg lift. Will continue to monitor for further needs/ defer to next LOC.      Goals:  Time frame for goals: 2 weeks  Pt will complete feeding tasks with independence at seated level   Pt will complete grooming tasks with independence at standing level   Pt will complete toileting tasks with independence using ETS and grab bars   Pt will complete UB dressing tasks with independence seated EOB   Pt will complete LB dressing tasks with independence seated EOB   Pt will complete UB bathing tasks with Supervision while seated and AE PRN   Pt will complete LB bathing tasks with Supervision while seated and AE PRN   Pt will complete therapeutic exercise/activity to increase independence in ADL/IADL function  Pt will practice functional transfers and mobility with AD for increased safety and independence    Time:   Time in: 1342  Time out: 1429  Treatment Minutes: 35  Evaluation Minutes: 10  Total time: 47    Electronically signed by:    INDER Miller/L   License: [id number]  8/80/4009, 1:46 PM

## 2022-09-21 NOTE — PROGRESS NOTES
[address] [facility]  consulted by Dr. Zeinab Fong for monitoring and adjustment. Indication for treatment: bone and joint infection  Goal trough: [] 10-15 mcg/mL or [x] 15-20 mcg/ml  AUC/GLORIA: [] <500 or [x] 400-600    Pertinent Laboratory Values:   Temp Readings from Last 3 Encounters:   09/21/22 97.6 °F (36.4 °C) (Oral)     Recent Labs     09/19/22  1348 09/20/22  0600   WBC 4.8 3.9*   LACTATE 0.5  --        Recent Labs     09/19/22  1348 09/20/22  0600   BUN 21 13   CREATININE 0.7 0.6       Estimated Creatinine Clearance: 130 mL/min (based on SCr of 0.6 mg/dL). Intake/Output Summary (Last 24 hours) at 9/21/2022 1201  Last data filed at 9/20/2022 2000  Gross per 24 hour   Intake 120 ml   Output --   Net 120 ml         Pertinent Cultures:  Date    Source    Results  9/19                             Synovial fluid                         In process        Vancomycin level:   TROUGH:  No results for input(s): VANCOTROUGH in the last 72 hours. RANDOM:  No results for input(s): VANCORANDOM in the last 72 hours. Assessment:  SCr, BUN, and urine output: SCR has been close to baseline, No UOP data  LEFT KNEE ARTHROSCOPIC IRRIGATION AND DEBRIDEMENT WITH DRAIN PLACEMENT (performed by surgery yesterday)  Day(s) of therapy: 3  Vancomycin concentration:   9/22 - random level at 06:00    Plan:  Renal trends have been close to baseline. Continue vancomycin 1000mg Q12h with a predicted AUC of 415 at steady state  Vanco level not drawn this morning. Rescheduled vanco level for tomorrow am.  Pharmacy will continue to monitor patient and adjust therapy as indicated    Apoorva 3 09/22 @06:00    Thank you for the consult. Justin Arora, [facility]  9/21/2022 12:01 PM

## 2022-09-21 NOTE — PROGRESS NOTES
Physical Therapy  [facility] ACUTE CARE PHYSICAL THERAPY EVALUATION  Andre Pope, 1970, [id number], 9/21/2022    History  [place]:  The primary encounter diagnosis was Acute deep vein thrombosis (DVT) of popliteal vein of left lower extremity ([address] Diagnoses of Non-intractable vomiting with nausea, unspecified vomiting type, Generalized abdominal pain, and Injury of left knee, subsequent encounter were also pertinent to this visit. Patient  has a past medical history of Arthritis, Depression, Disease of blood and blood forming organ, Hypertension, Immune deficiency disorder ([address] Kidney stone, Seizures ([address] and Thyroid disease. Patient  has a past surgical history that includes Abdomen surgery; Appendectomy; Cholecystectomy; Hysterectomy; hernia repair; Tonsillectomy; vascular surgery; and Knee arthroscopy (Left, 9/20/2022). Assessment:  Pt presents 2 days s/p admission for LLE pain, swelling, n/v, abdominal pain; currently receiving tx for L acute nonocclusive femoral/popliteal DVT + L knee potential septic arthritis. Pt is from home, lives w/ family, ind PLOF, no AD. Pt is primarily limited by pain but demonstrates additional impairments in functional mobility, strength, L knee ROM, balance, endurance. She demonstrates skills to perform mobility, however, pain from temporary drain and recent procedure is limiting capacity to complete further mobility ; anticipating functional improvement w/ pain mgmt. Recommending home w/ HH PT. Complexity: Moderate  Prognosis: Good, no significant barriers to participation at this time  Plan 3-4+/week, 1 week  Equipment: walker as needed - pt will obtain     Recommendations for NURSING mobility: amb to and from BR using FWW    Subjective:  Patient states:  \"I had trouble getting comfortable last night, didn't get much rest.\"    Pain:  7/10 L knee pain.     Communication with other providers:  Handoff to RN  Restrictions: fall risk; general precautions; contact precautions; WBAT on LLE, ROM as tolerated    Home Setup/Prior level of function  Social/Functional History  Lives With: Family (brothers following surgery)  Type of Home: House  Home Layout: One level  Home Access: Stairs to enter with rails  Entrance Stairs - Number of Steps: 2  Entrance Stairs - Rails: Left  Bathroom Shower/Tub: Tub/Shower unit  Bathroom Toilet: Standard  Home Equipment: Crutches  Has the patient had two or more falls in the past year or any fall with injury in the past year?: No  Receives Help From: Family  ADL Assistance: Independent  Homemaking Assistance: Independent  Homemaking Responsibilities: Yes  Ambulation Assistance: Independent  Transfer Assistance: Independent  Active : Yes  Occupation: On disability    Examination of body systems (includes body structures/functions, activity/participation limitations):  Observation:  Supine, awake, alert, agreeable. She is restless and painful, difficulty getting comfortable ; complains of pain t/o, med pass facilitated.  Tele, bed alarm, peripheral IV in place upon arrival.  Posture: WFL  Vision:  [facility]  Hearing:  WFL  Cardiopulmonary:  WFL  Cognition: A&O x 4 ; alert, follows commands w/o repetition, attention intact, memory appears intact, dec safety awareness, mod cues needed for sequencing/setup, no cues needed for initiation, dec insight into deficits    Musculoskeletal  ROM:  R: AROM WFL  L:  ankle WFL, knee flexion ~75*, knee ext ~15*, hip WFL  Strength R/L:  grossly >3/5 as observed by ROM and function  Sensation: WFL  Tone: normotonic  Coordination: WFL  Proprioception: WFL  Gait pattern: antalgic, step to patterning, guarded in quality and willingness to move    Mobility:  Supine to sit:  SBA  Transfers: [facility]  Sitting balance:  good    Standing balance:  fair + ; intermittently demonstrating unsupported, pain limiting  Gait: 10 ft + 10 ft using FWW at CGA ; dec viviana, antalgia during WB on L LE, heavy BL UE support, step to leading w/ L LE, impulsive and restless in pain, reluctant to perform much ROM of affected L knee    [facility] 6 Clicks Inpatient Mobility:  AM-PAC Inpatient Mobility Raw Score : 16    Goals:  Pt Goals: return to PLOF  Short Term Goals  Time Frame for Short term goals: 1 week  Short term goal 1: pt will complete bed mobility at mod ind  Short term goal 2: pt will complete transfers at sup level  Short term goal 3: pt will ambulate 100 ft using LRAD at sup level     Treatment plan:  Bed mobility, functional mobility, transfers, balance, gait, TA, TX, strength activities    Treatment:  Therapeutic Activity Training:   Therapeutic activity training was instructed today. Cues were given for safety, sequence, UE/LE placement, awareness, and balance. Activities performed today included bed mobility training, sup-sit, sit-stand, SPT.   Functional mobility training, precaution education, safety education, AD mgmt education, DC planning    Positioned for comfort and pressure relief  Safety: patient left in chair, left in chair, call light within reach, RN notified, gait belt used, all needs met    Time:   Time in: 0854  Time out: 0918  Timed treatment minutes: 14 (TA)  Total time: 24    Electronically signed by:    Donna Smiley PT, DPT  9/21/2022, 11:47 AM

## 2022-09-21 NOTE — PROGRESS NOTES
Katiana Koehler (1970)    Daily Progress Note-  Chitra Dudley DO,                  Today's Date:   9/21/2022          Subjective:     Patient seen and examined resting comfortably in bed this morning. No new complaints, did well overnight per nursing staff. Patient did have some saturation of the dressing as well as some mild bleeding at the medial portal yesterday after surgery and the dressing was changed and the bleeding resolved on its own. Per the nursing staff, patient has had a lot of pain postoperatively but after increasing Dilaudid she is doing much better. They state that pain control has been an issue for the patient preoperatively as well and the patient does have a history of pain tolerance issues. Doing okay postoperatively. No new orthopedic complaints. No new questions about treatment plan. Pain controlled at this time as she is resting. Patient denies fever, chills, vomiting, shortness of breath, chest pain. Patient does continue with some nausea but none at this time. Patient has been up and ambulating with mild pain at the knee but has been able to do so. Objective:     Vitals:    09/21/22 0303   BP:    Pulse:    Resp: 18   Temp:    SpO2:         Review of Systems   Constitutional:  Positive for activity change. Negative for fatigue and fever. HENT:  Negative for sneezing, sore throat and voice change. Respiratory:  Negative for cough, shortness of breath and wheezing. Cardiovascular:  Negative for leg swelling. Gastrointestinal:  Negative for nausea and vomiting. Musculoskeletal:  Positive for arthralgias, joint swelling and myalgias. Negative for back pain, gait problem, neck pain and neck stiffness. Skin:  Negative for color change, rash and wound. Neurological:  Negative for weakness and numbness.    Psychiatric/Behavioral:  Negative for behavioral problems, confusion and self-injury. Physical Exam:     The patient is awake and alert  Resting comfortably in bed    Operative extremity:    The dressing is clean dry and intact. When peeling the dressing back this morning the drain remains in place and there is no active bleeding. Sensation and motor function intact distally. Patient wiggles toes without difficulty  No pain in joint above or below  Guarded range of motion of knee at this time. Pain with active and passive flexion but active motion is intact  Compartments soft, compressible  Negative Petar's at this time but patient again was positive for blood clot in his leg   No concerning signs for infection including warmth, erythema, significant swelling      LABS   CBC:   Recent Labs     09/19/22  1348 09/20/22  0600   WBC 4.8 3.9*   HGB 10.6* 9.8*    170     Preoperative right knee aspiration results    Culture-aerobic and anaerobic demonstrate:   FEW   NECROTIC POLYS  P       Gram Smear NO ORGANISMS SEEN P     Culture Prelim Report No growth to date Anaerobic culture further report to follow     Awaiting Gram stain  Crystals: NO CRYSTALS SEEN     Awaiting intraoperative fluid collection results    IMAGING     No new orthopedic imaging      Assessment and Plan     1. POD #1 s/p left knee arthroscopic irrigation and debridement    1:  Physical therapy consult for mobilization   -WBAT   -No precautions  2:  DVT prophylaxis   -Continue DVT prophylaxis per hospitalist  3:  Continue Pain Control   -wean to PO meds  4. Medical management per hospitalist service   -Continue to monitor Hgb   -Continue antibiotic treatment per their plan -currently on vancomycin  5:  D/C Planning:     -Per case management  6. No further orthopedic intervention planned at this time  7. Please contact me if further bleeding occurs at the knee at this time he remains a nonissue. Plan will be for removal of drain tomorrow.   We will maintain sutures for 2 weeks.  I will continue to monitor results of aspiration and fluid collection results  8. Patient requesting gabapentin at this time. She states that she was on this preoperatively and feels that being taken off of it completely has likely caused her increased pain as well as her lower pain tolerance. Also requesting that the podiatrist that it operated on her foot previously was contacted. I will message the nursing staff about these issues.      Mya Rodriguez, DO

## 2022-09-21 NOTE — CONSULTS
[address], [address] [facility]                                  CONSULTATION    PATIENT NAME: Bernard Batista                    :        1970  MED REC NO:   [id number]                          ROOM:       [id number]  ACCOUNT NO:   [de-identified]                           ADMIT DATE: 2022  PROVIDER:     Dilcia Oneal MD    CONSULT DATE:  2022    CHIEF COMPLAINT:  1. Abdominal pain with nausea, vomiting and diarrhea, rule out  infectious colitis. 2.  Abnormal CAT scan with pneumobilia noted - chronic in nature. HISTORY OF PRESENT ILLNESS:  As follows: The patient is a 42-year-old  white female, patient known to me from her multiple previous  hospitalizations, last seen in consult on 2022 with past medical  history significant for morbid obesity status post sleeve gastrectomy  initially by Dr. 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 The patient was admitted to the hospital on 2022 with abdominal  pain, nausea, vomiting and diarrhea. The patient also recently was in  [place] and had a CAT scan done of the abdomen which showed pneumobilia  and dilatation of the biliary tree which has been chronic in nature.    The patient recently has been diagnosed with swelling of the left knee  also for which she underwent arthrocentesis and also has a DVT for which  the patient is on Eliquis. The patient continues to complain of nausea,  vomiting and loose BM. Stool studies are pending. The patient is  hemodynamically stable. The patient has had extensive GI workup done in  the past.  Please refer to old records. The patient has had multiple EGDs done, at least three EGDs by me and  four by Dr. Елена Bruno.  The last EGD by Dr. Елена Bruno was on 04/02/2019  which showed bleeding from the distal staple line and the patient's last  EGD on 06/19/2019 was unremarkable. The patient also has had  colonoscopy done in the past and is being followed up by a pain  specialist also for intractable upper abdominal pain. The patient has  been to [facility] also in 2020 for chronic right upper  quadrant abdominal pain and possible ERCP, but no ERCP was done and the  patient was discharged to be followed up locally here in [facility]. The patient also has had MRCP done on 09/23/2020 which was unremarkable. The patient is hemodynamically stable. REVIEW OF SYSTEMS:  CENTRAL NERVOUS SYSTEM:  The patient denies headache or focal  sensorimotor symptoms. CARDIOVASCULAR SYSTEM:  No history of chest pain or shortness of breath. The patient, however, does complain of swelling of the lower  extremities. GENITOURINARY SYSTEM:  No history of dysuria, pyuria, or hematuria. MUSCULOSKELETAL SYSTEM:  The patient complains of swelling and pain on  the lower extremities and left knee. RESPIRATORY SYSTEM:  No history of cough, hemoptysis, fever or chills.     PAST MEDICAL HISTORY:  Significant for history of morbid obesity, status  post sleeve gastrectomy initially done by Dr. Елена Bruno which was  converted to Jet-en-Y gastric bypass with lysis of adhesions on  08/16/2021, also history of lupus, hypertension, depression/anxiety,  gastroesophageal reflux disease, peptic ulcer disease, hypothyroidism,  history of gallstones status post cholecystectomy complicated by bile  duct injury for which the patient had at least 7 or 8 ERCPs done in  [place], [place] with placement of biliary/pancreatic stents which  were subsequently removed and also history of acute/recurrent/chronic  pancreatitis, anemia, chronic back pain, fibromyalgia, history of  recurrent episodes of upper GI bleeding requiring multiple EGDs, seizure  disorder, chronic pain syndrome and also DVT. FAMILY HISTORY:  The patient's father was diagnosed with carcinoma of  the prostate, maternal aunt with carcinoma of the breast and maternal  grandmother with carcinoma of the stomach and maternal grandfather with  carcinoma of the lung. MEDICATIONS:  Please refer to the chart. SOCIOECONOMIC HISTORY:  No history of EtOH abuse. The patient does not  smoke cigarettes. SURGERIES:  The patient has had a total abdominal hysterectomy done,  cholecystectomy, MediPort placed which got infected and was removed by  Dr. Camilla Mejia, appendectomy, tonsillectomy and adenoidectomy, partial  removal of the left lung for benign disease, dental surgery, ,  multiple EGDs and at least one colonoscopy by Dr. Sailaja Mckenzie 8 years ago and  at least 7 or 8 ERCPs done in [place], [place] for common bile duct  stone/sphincter of Oddi dysfunction and placement of biliary/pancreatic  stents which were subsequently removed. The patient also has had  multiple EGDs done, at least two by Dr. Leeanne Herrmann, three by me, and four by  Dr. Camilla Mejia and also had surgery done on left foot for osteomyelitis,  sleeve gastrectomy initially by Dr. Camilla Mejia, which was converted to  Jet-en-Y gastric bypass on 2021 with lysis of adhesions. ALLERGIES:  The patient is allergic to ASPIRIN, SHELLFISH, TORADOL,  COMPAZINE, TROMETHAMINE, HALDOL and REGLAN.     PHYSICAL EXAMINATION:  GENERAL:  Shows a 20-year-old white female who is morbidly obese, lying  flat in bed, in no acute distress. She is awake, alert and oriented and  pleasant to talk with. VITAL SIGNS:  Stable. HEENT:  Examination shows skull to be atraumatic. NECK:  Supple. CHEST:  Shows diminished breath sounds. HEART:  S1, S2 is normal.  ABDOMEN:  Soft, obese with mild generalized tenderness. No guarding or  rigidity. Liver and spleen are not palpable. Bowel sounds are present. RECTAL:  Exam is deferred. CNS:  Exam shows the patient to be awake, alert and oriented. There are  no focal sensorimotor signs. MUSCULOSKELETAL:  Exam shows edema of the lower extremities. LABORATORY DATA:  The labs drawn during the present hospitalization  comprised of chem profile and LFTs which are within normal limits. CBC  shows a WBC count of 3.9, hemoglobin of 9.8 and platelet count is  [phone]. CAT scan of the abdomen and pelvis was done yesterday which  showed postsurgical changes from previous Jet-en-Y gastric bypass with  stable pneumobilia. IMPRESSION:  3  A 49-year-old white female with multiple comorbidities, admitted  with nausea, vomiting and some diarrhea, rule out infectious colitis. 2.  Abnormal CAT scan findings which have been chronic in nature. 3.  History of recent DVT. RECOMMENDATIONS:  1. Agree with present management. 2.  We will follow up on stool studies for GI disease panel and C. diff  toxin. 3.  No need for EGD or colonoscopy at this point. 4.  We will follow the patient in the hospital.  5.  We will follow up on CBC, chem profile in a.m. 6.  The case and plan have been discussed in detail with the patient and  her bedside nurse and all their questions have been answered.         Monroe Toro MD    D: 09/20/2022 16:54:57       T: 09/20/2022 16:59:10     [email or web address]  Job#: [id number]     Doc#: [id number]    CC:

## 2022-09-21 NOTE — PROGRESS NOTES
V2.0  [facility] [place] [facility]      Name:  Sanjeev Reyes /Age/Sex: 1970  (46 y.o. female)   MRN & CSN:  [id number] & [id number] Encounter Date/Time: 2022 6:16 PM EDT    Location:  [address] [place] [address] PCP: No primary care provider on file. Hospital Day: 3    Assessment and Plan:   Sanjeev Reyes is a 46 y.o. female who presents with left knee septic joint      Plan:  Left knee effusion concern for septic joint  -I&D . On Vanco and cefazolin. Cultures pending. Ortho following. CRP 6.5.  - Crystals negative. Synovial culture: Negative. With elevated WBCs on synovial fluid and a high neutrophil count, differential includes inflammatory versus septic arthritis. Patient states she does have lupus, but is not currently on any medications at this time. Intractable nausea and vomiting, chronic pneumobilia  -GI consulted. On antiemetics. -GI following. GI disease panel and C. difficile ordered. Acute nonocclusive DVT of left distal femoral and popliteal veins  -on therapeutic Lovenox. Anemia  -hemoglobin 9.8. Seizure disorder  Hypertension  History of left foot infection/osteomyelitis    Diet ADULT DIET; Regular    DVT Prophylaxis [x] Lovenox, []  Heparin, [] SCDs, [] Ambulation,  [] Eliquis, [] Xarelto  [] Coumadin   Code Status Full Code   Disposition From: home  Expected Disposition: Home with home health care  Estimated Date of Discharge:   Patient requires continued admission due to swollen left knee joint         Subjective/Interval history:   Chief Complaint: No chief complaint on file. She still has pain in her leg, she also feels like she is having yeast infection    Review of Systems:    Negative unless mentioned above    Objective:      Intake/Output Summary (Last 24 hours) at 2022 1102  Last data filed at 2022  Gross per 24 hour   Intake 120 ml   Output --   Net 120 ml        Vitals:   /83   Pulse (!) 103   Temp 97.6 °F (36.4 °C) (Oral) Resp 18   Ht 5' 4\" (1.626 m)   Wt 238 lb (108 kg)   SpO2 99%   BMI 40.85 kg/m²     Physical Exam:   General: NAD, sitting in chair  Eyes: no discharge  HENT: NCAT  Cardiovascular: RRR  Respiratory: Clear to auscultation b/l bs+  Gastrointestinal: Soft, non tender, nondistended  Genitourinary: no suprapubic tenderness  Musculoskeletal: left knee is wrapped, has stitches and able to move left foot  Skin: warm, dry, no gross lesions  Neuro: Alert. No gross deficits  Psych: Mood appropriate.      Medications:   Medications:    gabapentin  800 mg Oral TID    vitamin D  1,000 Units Oral Daily    vitamin B-12  500 mcg Oral Daily    sodium chloride flush  5-40 mL IntraVENous 2 times per day    enoxaparin  1 mg/kg SubCUTAneous BID    ondansetron  4 mg IntraVENous 4 times per day    vancomycin  1,000 mg IntraVENous Q12H    PARoxetine  30 mg Oral Daily    hydrOXYzine HCl  50 mg Oral BID    levothyroxine  125 mcg Oral Daily    levETIRAcetam  1,000 mg Oral BID    pantoprazole  40 mg Oral BID AC      Infusions:    lactated ringers 125 mL/hr at 09/21/22 0511    sodium chloride       PRN Meds: tiZANidine, 4 mg, PRN  sodium chloride flush, 5-40 mL, PRN  sodium chloride, , PRN  oxyCODONE, 5 mg, Q4H PRN   Or  oxyCODONE, 10 mg, Q4H PRN  HYDROmorphone, 0.25 mg, Q3H PRN   Or  HYDROmorphone, 0.5 mg, Q3H PRN  ondansetron, 4 mg, Q8H PRN   Or  ondansetron, 4 mg, Q6H PRN  polyethylene glycol, 17 g, Daily PRN  acetaminophen, 650 mg, Q6H PRN   Or  acetaminophen, 650 mg, Q6H PRN  oxyCODONE-acetaminophen, 1 tablet, Q4H PRN  promethazine, 25 mg, Q6H PRN      Labs      Recent Labs     09/19/22  1348 09/20/22  0600   WBC 4.8 3.9*   HGB 10.6* 9.8*   HCT 33.5* 33.0*    170      Recent Labs     09/19/22  1348 09/20/22  0600    140   K 4.1 3.8    108   CO2 23 23   BUN 21 13   CREATININE 0.7 0.6     Recent Labs     09/19/22  1348 09/20/22  0600   AST 20 22   ALT 17 16   BILITOT 0.2 0.2   ALKPHOS 129 116     No results for input(s): INR in the last 72 hours. No results for input(s): CKTOTAL, CKMB, CKMBINDEX, TROPONINT in the last 72 hours.   No results found for: LABA1C  CALCIUM:  9.4/23 (09/20 0600)  No results found for: MG        Electronically signed by Brenda Leo MD on 9/21/2022 at 11:02 AM

## 2022-09-22 LAB
DOSE AMOUNT: NORMAL
DOSE TIME: NORMAL
VANCOMYCIN RANDOM: 11.8 UG/ML

## 2022-09-22 PROCEDURE — 2580000003 HC RX 258: Performed by: STUDENT IN AN ORGANIZED HEALTH CARE EDUCATION/TRAINING PROGRAM

## 2022-09-22 PROCEDURE — 6360000002 HC RX W HCPCS: Performed by: STUDENT IN AN ORGANIZED HEALTH CARE EDUCATION/TRAINING PROGRAM

## 2022-09-22 PROCEDURE — 87040 BLOOD CULTURE FOR BACTERIA: CPT

## 2022-09-22 PROCEDURE — 2500000003 HC RX 250 WO HCPCS: Performed by: HOSPITALIST

## 2022-09-22 PROCEDURE — 97116 GAIT TRAINING THERAPY: CPT

## 2022-09-22 PROCEDURE — 94761 N-INVAS EAR/PLS OXIMETRY MLT: CPT

## 2022-09-22 PROCEDURE — 6370000000 HC RX 637 (ALT 250 FOR IP): Performed by: HOSPITALIST

## 2022-09-22 PROCEDURE — 1200000000 HC SEMI PRIVATE

## 2022-09-22 PROCEDURE — 6360000002 HC RX W HCPCS: Performed by: HOSPITALIST

## 2022-09-22 PROCEDURE — 80202 ASSAY OF VANCOMYCIN: CPT

## 2022-09-22 PROCEDURE — 2580000003 HC RX 258: Performed by: HOSPITALIST

## 2022-09-22 PROCEDURE — 6370000000 HC RX 637 (ALT 250 FOR IP): Performed by: STUDENT IN AN ORGANIZED HEALTH CARE EDUCATION/TRAINING PROGRAM

## 2022-09-22 PROCEDURE — 36415 COLL VENOUS BLD VENIPUNCTURE: CPT

## 2022-09-22 RX ADMIN — PANTOPRAZOLE SODIUM 40 MG: 40 TABLET, DELAYED RELEASE ORAL at 05:57

## 2022-09-22 RX ADMIN — SODIUM CHLORIDE, PRESERVATIVE FREE 10 ML: 5 INJECTION INTRAVENOUS at 21:06

## 2022-09-22 RX ADMIN — ONDANSETRON 4 MG: 2 INJECTION INTRAMUSCULAR; INTRAVENOUS at 17:20

## 2022-09-22 RX ADMIN — ENOXAPARIN SODIUM 105 MG: 150 INJECTION SUBCUTANEOUS at 12:04

## 2022-09-22 RX ADMIN — SODIUM CHLORIDE, POTASSIUM CHLORIDE, SODIUM LACTATE AND CALCIUM CHLORIDE: 600; 310; 30; 20 INJECTION, SOLUTION INTRAVENOUS at 02:59

## 2022-09-22 RX ADMIN — HYDROMORPHONE HYDROCHLORIDE 0.5 MG: 1 INJECTION, SOLUTION INTRAMUSCULAR; INTRAVENOUS; SUBCUTANEOUS at 05:56

## 2022-09-22 RX ADMIN — CYANOCOBALAMIN TAB 1000 MCG 500 MCG: 1000 TAB at 08:05

## 2022-09-22 RX ADMIN — SODIUM CHLORIDE, PRESERVATIVE FREE 10 ML: 5 INJECTION INTRAVENOUS at 12:05

## 2022-09-22 RX ADMIN — MICONAZOLE NITRATE: 2 POWDER TOPICAL at 21:07

## 2022-09-22 RX ADMIN — LEVETIRACETAM 1000 MG: 500 TABLET, FILM COATED ORAL at 08:05

## 2022-09-22 RX ADMIN — LEVETIRACETAM 1000 MG: 500 TABLET, FILM COATED ORAL at 21:06

## 2022-09-22 RX ADMIN — OXYCODONE HYDROCHLORIDE 10 MG: 10 TABLET ORAL at 08:06

## 2022-09-22 RX ADMIN — PANTOPRAZOLE SODIUM 40 MG: 40 TABLET, DELAYED RELEASE ORAL at 17:20

## 2022-09-22 RX ADMIN — GABAPENTIN 800 MG: 400 CAPSULE ORAL at 13:57

## 2022-09-22 RX ADMIN — VANCOMYCIN HYDROCHLORIDE 1250 MG: 1.25 INJECTION, POWDER, LYOPHILIZED, FOR SOLUTION INTRAVENOUS at 21:13

## 2022-09-22 RX ADMIN — HYDROXYZINE HYDROCHLORIDE 50 MG: 50 TABLET, FILM COATED ORAL at 08:06

## 2022-09-22 RX ADMIN — HYDROMORPHONE HYDROCHLORIDE 0.5 MG: 1 INJECTION, SOLUTION INTRAMUSCULAR; INTRAVENOUS; SUBCUTANEOUS at 02:57

## 2022-09-22 RX ADMIN — HYDROXYZINE HYDROCHLORIDE 50 MG: 50 TABLET, FILM COATED ORAL at 21:07

## 2022-09-22 RX ADMIN — SODIUM CHLORIDE, POTASSIUM CHLORIDE, SODIUM LACTATE AND CALCIUM CHLORIDE: 600; 310; 30; 20 INJECTION, SOLUTION INTRAVENOUS at 21:12

## 2022-09-22 RX ADMIN — HYDROMORPHONE HYDROCHLORIDE 0.5 MG: 1 INJECTION, SOLUTION INTRAMUSCULAR; INTRAVENOUS; SUBCUTANEOUS at 17:20

## 2022-09-22 RX ADMIN — GABAPENTIN 800 MG: 400 CAPSULE ORAL at 08:05

## 2022-09-22 RX ADMIN — ONDANSETRON 4 MG: 2 INJECTION INTRAMUSCULAR; INTRAVENOUS at 05:56

## 2022-09-22 RX ADMIN — OXYCODONE HYDROCHLORIDE 10 MG: 10 TABLET ORAL at 12:03

## 2022-09-22 RX ADMIN — VANCOMYCIN HYDROCHLORIDE 1000 MG: 1 INJECTION, POWDER, LYOPHILIZED, FOR SOLUTION INTRAVENOUS at 08:11

## 2022-09-22 RX ADMIN — PAROXETINE HYDROCHLORIDE 30 MG: 20 TABLET, FILM COATED ORAL at 08:06

## 2022-09-22 RX ADMIN — HYDROMORPHONE HYDROCHLORIDE 0.5 MG: 1 INJECTION, SOLUTION INTRAMUSCULAR; INTRAVENOUS; SUBCUTANEOUS at 21:06

## 2022-09-22 RX ADMIN — ENOXAPARIN SODIUM 105 MG: 150 INJECTION SUBCUTANEOUS at 21:06

## 2022-09-22 RX ADMIN — HYDROMORPHONE HYDROCHLORIDE 0.5 MG: 1 INJECTION, SOLUTION INTRAMUSCULAR; INTRAVENOUS; SUBCUTANEOUS at 10:06

## 2022-09-22 RX ADMIN — GABAPENTIN 800 MG: 400 CAPSULE ORAL at 21:07

## 2022-09-22 RX ADMIN — TIZANIDINE 4 MG: 4 TABLET ORAL at 17:20

## 2022-09-22 RX ADMIN — TIZANIDINE 4 MG: 4 TABLET ORAL at 08:14

## 2022-09-22 RX ADMIN — LEVOTHYROXINE SODIUM 125 MCG: 25 TABLET ORAL at 05:56

## 2022-09-22 RX ADMIN — MICONAZOLE NITRATE: 2 POWDER TOPICAL at 12:04

## 2022-09-22 RX ADMIN — Medication 1000 UNITS: at 08:05

## 2022-09-22 RX ADMIN — ONDANSETRON 4 MG: 2 INJECTION INTRAMUSCULAR; INTRAVENOUS at 12:03

## 2022-09-22 RX ADMIN — HYDROMORPHONE HYDROCHLORIDE 0.5 MG: 1 INJECTION, SOLUTION INTRAMUSCULAR; INTRAVENOUS; SUBCUTANEOUS at 13:57

## 2022-09-22 ASSESSMENT — ENCOUNTER SYMPTOMS
WHEEZING: 0
BACK PAIN: 0
NAUSEA: 0
COLOR CHANGE: 0
VOMITING: 0
VOICE CHANGE: 0
SHORTNESS OF BREATH: 0
SORE THROAT: 0
COUGH: 0

## 2022-09-22 ASSESSMENT — PAIN DESCRIPTION - ORIENTATION
ORIENTATION: LEFT

## 2022-09-22 ASSESSMENT — PAIN DESCRIPTION - LOCATION
LOCATION: HIP;LEG
LOCATION: KNEE
LOCATION: KNEE

## 2022-09-22 ASSESSMENT — PAIN SCALES - GENERAL
PAINLEVEL_OUTOF10: 7
PAINLEVEL_OUTOF10: 10
PAINLEVEL_OUTOF10: 8

## 2022-09-22 NOTE — CARE COORDINATION
CM into see pt to continue to discuss discharge planning. Pt offered home care and she declined. CM will continue to follow for discharge needs.

## 2022-09-22 NOTE — PROGRESS NOTES
Physical Therapy    Physical Therapy Treatment Note  Name: Meme Collier MRN: [id number] :   1970   Date:  2022   Admission Date: 2022 Room:  [address] [place] [address]   Restrictions/Precautions:  fall risk; general precautions; LLE WBAT, okay to ROM  Communication with other providers:  RN handoff  Subjective:  Patient states:  \"I think I gotta turn back soon, I keep feeling like I'm going to throw up\"  Pain:   Location, Type, Intensity (0/10 to 10/10):  1010 L knee pain  Objective:    Observation:  Seated in recliner, awake, alert, agreeable. She is painful and avoidant of inc mobility. Tele, chair alarm in place. Treatment, including education/measures:  Sit <-> stand: min A  Standing balance: fair  Stand <-> sit: min A  Positioned for comfort and pressure relief ; all needs met, left in chair, chair alarmed, gait belt for OOB    Gait Training:  Cues were given for safety, sequence, device management, balance, posture, awareness, path. 85 ft using FWW at [facility] ; leading w/ L LE, somewhat step to patterning, inc BL UE support, dec viviana, antalgia during L LE WB, heavy UE support w/ fwd flexed posture    Assessment / Impression:    Pain limiting OOB this session, she is guarded and painful t/o session. Mod - max encouragement to promote improved participation ; cont to be limited strength, endurance, functional mobility. Will cont to follow.     Patient's tolerance of treatment:  fair  Barriers to improvement:  pain, hx of dysfunction  Plan for Next Session:    Cont w/ est DC plan   Progress functional mobility, strength, ROM, balance, endurance activities    Time in:  1413  Time out:  1428  Timed treatment minutes:  13 (GT)  Total treatment time:  15    Previously filed items:  Social/Functional History  Lives With: Family (brothers following surgery)  Type of Home: House  Home Layout: One level  Home Access: Stairs to enter with rails  Entrance Stairs - Number of Steps: 2  Entrance Stairs - Rails: Left  Bathroom Shower/Tub: Tub/Shower unit  Bathroom Toilet: Standard  Home Equipment: Crutches  Has the patient had two or more falls in the past year or any fall with injury in the past year?: No  Receives Help From: Family  ADL Assistance: Independent  Homemaking Assistance: Independent  Homemaking Responsibilities: Yes  Ambulation Assistance: Independent  Transfer Assistance: Independent  Active : Yes  Occupation: On disability  Patient Goals   Patient goals : return to [address] [place] [address]  Time Frame for Short term goals: 1 week  Short term goal 1: pt will complete bed mobility at mod ind  Short term goal 2: pt will complete transfers at sup level  Short term goal 3: pt will ambulate 100 ft using LRAD at sup level    Electronically signed by:    Jad Kruse PT, DPT  9/22/2022, 3:17 PM

## 2022-09-22 NOTE — PROGRESS NOTES
Blank Mathew (1970)    Daily Progress Note-  Annette Walter DO,                  Today's Date:   9/22/2022          Subjective:     Patient seen and examined resting comfortably in bed this morning. No new complaints, did well overnight per nursing staff. Patient states that she has had improving knee pain but still has issues with pain control. Upon my entering the room she was asleep and resting comfortably. Patient dressing was changed yesterday afternoon by the nursing staff due to some saturation but upon visualizing it today it does not saturate this time is doing well. No new orthopedic complaints. No new questions about treatment plan. Pain controlled at this time as she is resting. Patient denies fever, chills, vomiting, shortness of breath, chest pain. Nausea is improved. Patient continues to be up and ambulating with mild pain at the knee but has been able to do so. Objective:     Vitals:    09/22/22 0424   BP: 112/69   Pulse: 78   Resp: 20   Temp: 99.1 °F (37.3 °C)   SpO2: (!) 88%        Review of Systems   Constitutional:  Positive for activity change. Negative for fatigue and fever. HENT:  Negative for sneezing, sore throat and voice change. Respiratory:  Negative for cough, shortness of breath and wheezing. Cardiovascular:  Negative for leg swelling. Gastrointestinal:  Negative for nausea and vomiting. Musculoskeletal:  Positive for arthralgias, joint swelling and myalgias. Negative for back pain, gait problem, neck pain and neck stiffness. Skin:  Negative for color change, rash and wound. Neurological:  Negative for weakness and numbness. Psychiatric/Behavioral:  Negative for behavioral problems, confusion and self-injury.       Physical Exam:     The patient is awake and alert  Resting comfortably in bed    Operative extremity:    The dressing is clean dry and intact. Dressing removed to pull drain this morning. There is no active drainage or bleeding. Drain pulled without issue. Sensation and motor function intact distally. Patient wiggles toes without difficulty  4 cm in diameter area at tibia shaft anteriorly that was marked with nursing staff due to redness yesterday but this is resolved at this time  No pain in joint above or below  Guarded range of motion of knee at this time but slightly improved. Pain with active and passive flexion but active motion is intact  No significant effusion  Compartments soft, compressible  Positive Petar's which is changed from yesterday   No concerning signs for infection including warmth, erythema, significant swelling      LABS   CBC:   Recent Labs     09/19/22  1348 09/20/22  0600 09/21/22  2104   WBC 4.8 3.9* 4.3   HGB 10.6* 9.8* 9.2*    170 161       Preoperative right knee aspiration results    Culture-aerobic and anaerobic demonstrate:   Prelim Report No growth to date No growth 36 to 48 hours No Aerobic or Anaerobic growth Holding for 3 weeks. Anaerobic culture further report to follow No anaerobes isolated so far, Further report to follow        Awaiting Gram stain    Crystals: NO CRYSTALS SEEN     Intraoperative fluid collection demonstrates:  Prelim Report No growth to date No growth 36 to 48 hours P with either collection    IMAGING     No new orthopedic imaging    PROCEDURE     Dressing was slowly removed without issue. I was able to gently pull the Penrose drain without issue. The drain was fully intact upon its removal.  There was minimal fluid drainage after the drain was pulled and it was bloody with no concerning look of infection and no active bleeding. The area was wiped sterilely with Betadine. Steri-Strips were placed over the inferior portion of the wound to help close it. Sutures remain in place. New soft dressing as well as compressive Ace wrap were placed on the knee. Patient tolerated this well      Assessment and Plan     1. POD #2 s/p left knee arthroscopic irrigation and debridement    1:  Physical therapy consult for mobilization   -WBAT   -No precautions  2:  DVT prophylaxis   -Continue DVT prophylaxis per hospitalist  3:  Continue Pain Control   -wean to PO meds  4. Medical management per hospitalist service   -Continue to monitor Hgb   -Continue antibiotic treatment per their plan -currently on vancomycin  5:  D/C Planning:     -Per case management  6. Drain removed today without issue. 7.  Pain better controlled this morning  8. No further orthopedic intervention plan at this time. We will continue to monitor the patient and see her tomorrow. If doing well tomorrow, we will likely sign off and wait for follow-up in office at 1 week postop. Please contact me if there is any concerning drainage from the wound or any new orthopedic complaints.      Krystle Blackman, DO

## 2022-09-22 NOTE — PROGRESS NOTES
[address] [facility]  consulted by Dr. Nanette Greenwood for monitoring and adjustment. Indication for treatment: bone and joint infection  Goal trough: [] 10-15 mcg/mL or [x] 15-20 mcg/ml  AUC/GLORIA: [] <500 or [x] 400-600    Pertinent Laboratory Values:   Temp Readings from Last 3 Encounters:   09/22/22 98.3 °F (36.8 °C) (Oral)     Recent Labs     09/20/22  0600 09/21/22  2104   WBC 3.9* 4.3       Recent Labs     09/20/22  0600 09/21/22  2104   BUN 13 9   CREATININE 0.6 0.7       Estimated Creatinine Clearance: 112 mL/min (based on SCr of 0.7 mg/dL). Intake/Output Summary (Last 24 hours) at 9/22/2022 1458  Last data filed at 9/21/2022 2000  Gross per 24 hour   Intake 120 ml   Output --   Net 120 ml       Pertinent Cultures:  Date    Source    Results  9/19                             Synovial fluid                         In process      Vancomycin level:   TROUGH:  No results for input(s): VANCOTROUGH in the last 72 hours. RANDOM:    Recent Labs     09/22/22  0641   VANCORANDOM 11.8       Assessment:  SCr, BUN, and urine output: SCR has been close to baseline, No UOP data  LEFT KNEE ARTHROSCOPIC IRRIGATION AND DEBRIDEMENT WITH DRAIN PLACEMENT (performed by surgery 2 days ago)  Day(s) of therapy: 4  Vancomycin concentration:   9/22 - level = 11.8 - 10.5 hrs after vanco 1gm q12hr dose, AUC =407 - change dose    Plan:  Renal trends continue close to baseline. Change vancomycin to  1250mg Q12h with a predicted AUC of 509 & Vanco tr= 12.7 at steady state  Next vanco level in 2 days  Pharmacy will continue to monitor patient and adjust therapy as indicated    Apoorva 3 09/24 @06:00    Thank you for the consult.   Solange MirelesOjai Valley Community Hospital  9/22/2022 2:58 PM

## 2022-09-22 NOTE — PROGRESS NOTES
V2.0  [facility] [place]      Name:  Marlys Walker /Age/Sex: 1970  (46 y.o. female)   MRN & CSN:  [id number] & [id number] Encounter Date/Time: 2022 6:16 PM EDT    Location:  [facility]9/7865-R PCP: No primary care provider on file. Hospital Day: 4    Assessment and Plan:   Marlys Walker is a 46 y.o. female who presents with left knee septic joint      Plan:  Left knee effusion concern for septic joint  -I&D . On Vanco and cefazolin. Ortho following. CRP 6.5.  - Crystals negative. With elevated WBCs on synovial fluid and a high neutrophil count, differential includes inflammatory versus septic arthritis. Patient states she does have lupus, but is not currently on any medications at this time.  -Drain removed today. Blood cultures pending, lab having difficulty with drawing blood from patient. Will discuss with Ortho and will attempt to call [facility] for ID recommendations for antibiotics, may need 2 to 4 weeks of IV antibiotics. Left knee swab cultures : NGTD. Synovial culture : NGTD. Patient denies any history of IV drug use or STDs. Intractable nausea and vomiting, chronic pneumobilia  -On antiemetics. -GI following. GI disease panel and C. difficile ordered. - CT abdomen pelvis : Stable pneumobilia; punctate nonobstructing left renal calculus; no acute abnormality. Acute nonocclusive DVT of left distal femoral and popliteal veins  -on therapeutic Lovenox. Anemia  -Hemoglobin stable around 9. Seizure disorder  Hypertension  History of left foot infection/osteomyelitis    Diet ADULT DIET; Regular    DVT Prophylaxis [x] Lovenox, []  Heparin, [] SCDs, [] Ambulation,  [] Eliquis, [] Xarelto  [] Coumadin   Code Status Full Code   Disposition From: home  Expected Disposition: Home with home health care  Estimated Date of Discharge:   Patient requires continued admission due to knee swelling.          Subjective/Interval history:   Chief Complaint: No chief complaint on file. Still has some discomfort In her knee, had drain removed. Otherwise feeling ok. Review of Systems:    Negative unless mentioned above    Objective: Intake/Output Summary (Last 24 hours) at 9/22/2022 1644  Last data filed at 9/21/2022 2000  Gross per 24 hour   Intake 120 ml   Output --   Net 120 ml        Vitals:   /67   Pulse (!) 113   Temp 98.3 °F (36.8 °C) (Oral)   Resp 20   Ht 5' 4\" (1.626 m)   Wt 238 lb (108 kg)   SpO2 94%   BMI 40.85 kg/m²     Physical Exam:   General: NAD, sitting in chair  Eyes: no discharge  HENT: NCAT  Cardiovascular: RRR  Respiratory: Clear to auscultation b/l bs+  Gastrointestinal: Soft, non tender, nondistended  Genitourinary: no suprapubic tenderness  Musculoskeletal: left knee is wrapped  Skin: warm, dry, no gross lesions, some bruises in left leg  Neuro: Alert. No gross deficits  Psych: Mood appropriate.      Medications:   Medications:    vancomycin  1,250 mg IntraVENous Q12H    gabapentin  800 mg Oral TID    Vitamin D  1,000 Units Oral Daily    vitamin B-12  500 mcg Oral Daily    miconazole   Topical BID    sodium chloride flush  5-40 mL IntraVENous 2 times per day    enoxaparin  1 mg/kg SubCUTAneous BID    ondansetron  4 mg IntraVENous 4 times per day    PARoxetine  30 mg Oral Daily    hydrOXYzine HCl  50 mg Oral BID    levothyroxine  125 mcg Oral Daily    levETIRAcetam  1,000 mg Oral BID    pantoprazole  40 mg Oral BID AC      Infusions:    lactated ringers 125 mL/hr at 09/22/22 0259    sodium chloride       PRN Meds: tiZANidine, 4 mg, Q8H PRN  sodium chloride flush, 5-40 mL, PRN  sodium chloride, , PRN  oxyCODONE, 5 mg, Q4H PRN   Or  oxyCODONE, 10 mg, Q4H PRN  HYDROmorphone, 0.25 mg, Q3H PRN   Or  HYDROmorphone, 0.5 mg, Q3H PRN  ondansetron, 4 mg, Q8H PRN   Or  ondansetron, 4 mg, Q6H PRN  polyethylene glycol, 17 g, Daily PRN  acetaminophen, 650 mg, Q6H PRN   Or  acetaminophen, 650 mg, Q6H PRN  oxyCODONE-acetaminophen, 1 tablet, Q4H PRN  promethazine, 25 mg, Q6H PRN      Labs      Recent Labs     09/20/22 0600 09/21/22 2104   WBC 3.9* 4.3   HGB 9.8* 9.2*   HCT 33.0* 28.8*    161      Recent Labs     09/20/22 0600 09/21/22 2104    132*   K 3.8 3.2*    100   CO2 23 22   BUN 13 9   CREATININE 0.6 0.7     Recent Labs     09/20/22 0600 09/21/22 2104   AST 22 23   ALT 16 13   BILITOT 0.2 0.4   ALKPHOS 116 114     Recent Labs     09/21/22 2104   INR 0.92     No results for input(s): CKTOTAL, CKMB, CKMBINDEX, TROPONINT in the last 72 hours.   No results found for: LABA1C  CALCIUM:  9.6/22 (09/21 2104)  No results found for: MG        Electronically signed by Debbie Junior MD on 9/22/2022 at 4:44 PM

## 2022-09-22 NOTE — CONSULTS
[address], [address] [facility]                                  CONSULTATION    PATIENT NAME: Sundar Butler                    :        1970  MED REC NO:   [id number]                          ROOM:       [id number]  ACCOUNT NO:   [de-identified]                           ADMIT DATE: 2022  PROVIDER:     Carmelo Ferrara DPM    CONSULT DATE:  2022    HISTORY OF PRESENT ILLNESS:  The patient is a very pleasant 47-year-old  female that is seen at bedside with a complaint of wanted to make sure  that her previous foot surgery was not causing the problem that she  currently has with her knee and her blood clot on the left. The patient  is seen at bedside. She is doing fine. This morning, she states that  her pain is controlled. The patient denies any fever, chills or  vomiting. No shortness of breath. No chest pain. The patient is able  to ambulate on the foot. PHYSICAL EXAMINATION:  VITAL SIGNS:  The patient's blood pressure 112/69, pulse 78,  respirations 20, temperature is 99.1. The patient's podiatric physical examination at this point shows that  she does have palpable pulses. Cap fill time less than 5 seconds. Skin  temperature was within normal limits. There is no increase in warmth or  swelling associated to the left foot. The first MPJ fusion is stable. There is no swelling around that area. The previous cyst which is below  the region of the first MPJ fusion site, there does not appear to be any  change in the size, the appearance or the consistency of the tissue  associated to that site that we previously removed a cyst from. The  patient does have the scarification associated from her previous  surgical sites, but this appears to be well maintained. There is no  open wound ulceration to the left foot. LABORATORY DATA:  White count is within normal limits.     ASSESSMENT:  At this time is a well healed left foot status post  excision of cyst.    TREATMENT:  The patient at this time is as far as my perspective, I do  not see any changes in the appearance of that foot compared to  postoperatively and the patient has done well. I do not see any  reoccurrence. The patient at this time is educated that if there are  any changes in the future, I can definitely and will definitely see her  as needed. Otherwise, we will let them address her current knee  concerns and the DVT per Dr. Kimberly Pineda and the hospitalist.  If there is  any further need from me, please give me a call [phone].         Kay Alvarado DPM    D: 09/22/2022 12:21:38       T: 09/22/2022 12:25:26     [email or web address]  Job#: [id number]     Doc#: [id number]    CC:

## 2022-09-23 PROCEDURE — 2580000003 HC RX 258: Performed by: HOSPITALIST

## 2022-09-23 PROCEDURE — 1200000000 HC SEMI PRIVATE

## 2022-09-23 PROCEDURE — 6370000000 HC RX 637 (ALT 250 FOR IP): Performed by: STUDENT IN AN ORGANIZED HEALTH CARE EDUCATION/TRAINING PROGRAM

## 2022-09-23 PROCEDURE — 6360000002 HC RX W HCPCS: Performed by: HOSPITALIST

## 2022-09-23 PROCEDURE — 6360000002 HC RX W HCPCS: Performed by: STUDENT IN AN ORGANIZED HEALTH CARE EDUCATION/TRAINING PROGRAM

## 2022-09-23 PROCEDURE — 6370000000 HC RX 637 (ALT 250 FOR IP): Performed by: HOSPITALIST

## 2022-09-23 PROCEDURE — 2580000003 HC RX 258: Performed by: STUDENT IN AN ORGANIZED HEALTH CARE EDUCATION/TRAINING PROGRAM

## 2022-09-23 PROCEDURE — 2500000003 HC RX 250 WO HCPCS: Performed by: HOSPITALIST

## 2022-09-23 PROCEDURE — 94761 N-INVAS EAR/PLS OXIMETRY MLT: CPT

## 2022-09-23 RX ORDER — PREDNISONE 20 MG/1
40 TABLET ORAL DAILY
Status: DISCONTINUED | OUTPATIENT
Start: 2022-09-23 | End: 2022-09-23

## 2022-09-23 RX ORDER — PREDNISONE 20 MG/1
20 TABLET ORAL DAILY
Status: DISCONTINUED | OUTPATIENT
Start: 2022-09-23 | End: 2022-09-24

## 2022-09-23 RX ADMIN — PREDNISONE 20 MG: 20 TABLET ORAL at 22:17

## 2022-09-23 RX ADMIN — SODIUM CHLORIDE, POTASSIUM CHLORIDE, SODIUM LACTATE AND CALCIUM CHLORIDE: 600; 310; 30; 20 INJECTION, SOLUTION INTRAVENOUS at 17:09

## 2022-09-23 RX ADMIN — VANCOMYCIN HYDROCHLORIDE 1250 MG: 1.25 INJECTION, POWDER, LYOPHILIZED, FOR SOLUTION INTRAVENOUS at 10:19

## 2022-09-23 RX ADMIN — TIZANIDINE 4 MG: 4 TABLET ORAL at 17:08

## 2022-09-23 RX ADMIN — SODIUM CHLORIDE, PRESERVATIVE FREE 10 ML: 5 INJECTION INTRAVENOUS at 10:14

## 2022-09-23 RX ADMIN — GABAPENTIN 800 MG: 400 CAPSULE ORAL at 10:17

## 2022-09-23 RX ADMIN — LEVETIRACETAM 1000 MG: 500 TABLET, FILM COATED ORAL at 20:45

## 2022-09-23 RX ADMIN — ONDANSETRON 4 MG: 2 INJECTION INTRAMUSCULAR; INTRAVENOUS at 06:07

## 2022-09-23 RX ADMIN — GABAPENTIN 800 MG: 400 CAPSULE ORAL at 13:24

## 2022-09-23 RX ADMIN — HYDROXYZINE HYDROCHLORIDE 50 MG: 50 TABLET, FILM COATED ORAL at 20:45

## 2022-09-23 RX ADMIN — HYDROMORPHONE HYDROCHLORIDE 0.5 MG: 1 INJECTION, SOLUTION INTRAMUSCULAR; INTRAVENOUS; SUBCUTANEOUS at 20:45

## 2022-09-23 RX ADMIN — ONDANSETRON 4 MG: 2 INJECTION INTRAMUSCULAR; INTRAVENOUS at 13:24

## 2022-09-23 RX ADMIN — HYDROMORPHONE HYDROCHLORIDE 0.5 MG: 1 INJECTION, SOLUTION INTRAMUSCULAR; INTRAVENOUS; SUBCUTANEOUS at 10:14

## 2022-09-23 RX ADMIN — HYDROMORPHONE HYDROCHLORIDE 0.5 MG: 1 INJECTION, SOLUTION INTRAMUSCULAR; INTRAVENOUS; SUBCUTANEOUS at 13:24

## 2022-09-23 RX ADMIN — MICONAZOLE NITRATE: 2 POWDER TOPICAL at 10:21

## 2022-09-23 RX ADMIN — ENOXAPARIN SODIUM 105 MG: 150 INJECTION SUBCUTANEOUS at 22:18

## 2022-09-23 RX ADMIN — PANTOPRAZOLE SODIUM 40 MG: 40 TABLET, DELAYED RELEASE ORAL at 06:07

## 2022-09-23 RX ADMIN — HYDROXYZINE HYDROCHLORIDE 50 MG: 50 TABLET, FILM COATED ORAL at 10:16

## 2022-09-23 RX ADMIN — HYDROMORPHONE HYDROCHLORIDE 0.5 MG: 1 INJECTION, SOLUTION INTRAMUSCULAR; INTRAVENOUS; SUBCUTANEOUS at 03:02

## 2022-09-23 RX ADMIN — OXYCODONE HYDROCHLORIDE 5 MG: 5 TABLET ORAL at 23:32

## 2022-09-23 RX ADMIN — CYANOCOBALAMIN TAB 1000 MCG 500 MCG: 1000 TAB at 10:16

## 2022-09-23 RX ADMIN — Medication 1000 UNITS: at 10:16

## 2022-09-23 RX ADMIN — PANTOPRAZOLE SODIUM 40 MG: 40 TABLET, DELAYED RELEASE ORAL at 17:08

## 2022-09-23 RX ADMIN — SODIUM CHLORIDE, PRESERVATIVE FREE 10 ML: 5 INJECTION INTRAVENOUS at 22:19

## 2022-09-23 RX ADMIN — VANCOMYCIN HYDROCHLORIDE 1250 MG: 1.25 INJECTION, POWDER, LYOPHILIZED, FOR SOLUTION INTRAVENOUS at 20:50

## 2022-09-23 RX ADMIN — PAROXETINE HYDROCHLORIDE 30 MG: 20 TABLET, FILM COATED ORAL at 10:15

## 2022-09-23 RX ADMIN — ONDANSETRON 4 MG: 2 INJECTION INTRAMUSCULAR; INTRAVENOUS at 23:32

## 2022-09-23 RX ADMIN — HYDROMORPHONE HYDROCHLORIDE 0.5 MG: 1 INJECTION, SOLUTION INTRAMUSCULAR; INTRAVENOUS; SUBCUTANEOUS at 17:07

## 2022-09-23 RX ADMIN — HYDROMORPHONE HYDROCHLORIDE 0.5 MG: 1 INJECTION, SOLUTION INTRAMUSCULAR; INTRAVENOUS; SUBCUTANEOUS at 06:07

## 2022-09-23 RX ADMIN — ENOXAPARIN SODIUM 105 MG: 150 INJECTION SUBCUTANEOUS at 10:15

## 2022-09-23 RX ADMIN — LEVOTHYROXINE SODIUM 125 MCG: 25 TABLET ORAL at 06:07

## 2022-09-23 RX ADMIN — ONDANSETRON 4 MG: 2 INJECTION INTRAMUSCULAR; INTRAVENOUS at 00:05

## 2022-09-23 RX ADMIN — HYDROMORPHONE HYDROCHLORIDE 0.5 MG: 1 INJECTION, SOLUTION INTRAMUSCULAR; INTRAVENOUS; SUBCUTANEOUS at 00:06

## 2022-09-23 RX ADMIN — SODIUM CHLORIDE, POTASSIUM CHLORIDE, SODIUM LACTATE AND CALCIUM CHLORIDE: 600; 310; 30; 20 INJECTION, SOLUTION INTRAVENOUS at 06:11

## 2022-09-23 RX ADMIN — LEVETIRACETAM 1000 MG: 500 TABLET, FILM COATED ORAL at 10:16

## 2022-09-23 RX ADMIN — GABAPENTIN 800 MG: 400 CAPSULE ORAL at 20:45

## 2022-09-23 RX ADMIN — OXYCODONE HYDROCHLORIDE 10 MG: 10 TABLET ORAL at 04:34

## 2022-09-23 RX ADMIN — ONDANSETRON 4 MG: 2 INJECTION INTRAMUSCULAR; INTRAVENOUS at 17:07

## 2022-09-23 ASSESSMENT — ENCOUNTER SYMPTOMS
VOICE CHANGE: 0
SORE THROAT: 0
WHEEZING: 0
BACK PAIN: 0
SHORTNESS OF BREATH: 0
COUGH: 0
COLOR CHANGE: 0
NAUSEA: 0
VOMITING: 0

## 2022-09-23 ASSESSMENT — PAIN SCALES - GENERAL
PAINLEVEL_OUTOF10: 10
PAINLEVEL_OUTOF10: 7
PAINLEVEL_OUTOF10: 10
PAINLEVEL_OUTOF10: 9

## 2022-09-23 ASSESSMENT — PAIN DESCRIPTION - LOCATION
LOCATION: ARM;LEG
LOCATION: KNEE
LOCATION: ARM;LEG
LOCATION: ARM;LEG

## 2022-09-23 ASSESSMENT — PAIN DESCRIPTION - ORIENTATION
ORIENTATION: LEFT

## 2022-09-23 ASSESSMENT — PAIN DESCRIPTION - PAIN TYPE: TYPE: CHRONIC PAIN

## 2022-09-23 ASSESSMENT — PAIN DESCRIPTION - DESCRIPTORS: DESCRIPTORS: ACHING

## 2022-09-23 NOTE — PROGRESS NOTES
V2.0  [facility] [place]      Name:  Justine Maza /Age/Sex: 1970  (46 y.o. female)   MRN & CSN:  [id number] & [id number] Encounter Date/Time: 2022 6:16 PM EDT    Location:  [address] [place] [address] PCP: No primary care provider on file. Hospital Day: 5    Assessment and Plan:   Justine Maza is a 46 y.o. female who presents with left knee septic joint      Plan:  Left knee effusion concern for septic joint  -I&D . On Vanco and cefazolin. Ortho following. CRP 6.5.  - Crystals negative. With elevated WBCs on synovial fluid and a high neutrophil count, differential includes inflammatory versus septic arthritis. Patient states she does have lupus, but is not currently on any medications at this time.  -Drain removed . Blood cultures pending, lab having difficulty with drawing blood from patient. Left knee swab cultures : NGTD. Synovial culture : NGTD. Patient denies any history of IV drug use or STDs. -D/W Ortho, orthopedics to sign off and recommend follow-up in 1 week, with suture removal around 2-3 weeks postop. .  Awaiting blood cultures and then will discuss with [facility] ID for IV antibiotic recommendations for home. Upon DC will likely place on vancomycin 1.25 g every 12. Intractable nausea and vomiting, chronic pneumobilia  -On antiemetics. -GI following. GI disease panel and C. difficile ordered. - CT abdomen pelvis : Stable pneumobilia; punctate nonobstructing left renal calculus; no acute abnormality. Acute nonocclusive DVT of left distal femoral and popliteal veins  -on therapeutic Lovenox. Anemia  -Hemoglobin stable around 9. Arthritis  -Patient states she feels like she is having a flareup of her lupus in her joints. Will place on low-dose prednisone and see if this helps. Seizure disorder  Hypertension  History of left foot infection/osteomyelitis    Diet ADULT DIET;  Regular    DVT Prophylaxis  [x]Lovenox, []  Heparin, [] SCDs, [] Ambulation,  [] Eliquis, [] Xarelto  [] Coumadin   Code Status Full Code   Disposition From: home  Expected Disposition: Home with home health care  Estimated Date of Discharge: 9/24  Patient requires continued admission due to knee swelling. Subjective/Interval history:   Chief Complaint: No chief complaint on file. She says she is feeling better but is also feeling stiff in her joints. Review of Systems:    Negative unless mentioned above    Objective: Intake/Output Summary (Last 24 hours) at 9/23/2022 1250  Last data filed at 9/22/2022 2115  Gross per 24 hour   Intake 120 ml   Output --   Net 120 ml        Vitals:   /82   Pulse 68   Temp 98.3 °F (36.8 °C) (Oral)   Resp 16   Ht 5' 4\" (1.626 m)   Wt 238 lb (108 kg)   SpO2 95%   BMI 40.85 kg/m²     Physical Exam:   General: NAD, laying in bed  Eyes: no discharge  HENT: NCAT  Cardiovascular: RRR  Respiratory: Clear to auscultation b/l bs+  Gastrointestinal: Soft, non tender, nondistended  Genitourinary: no suprapubic tenderness  Musculoskeletal: left knee is wrapped  Skin: warm, dry, no gross lesions, some bruises in left leg  Neuro: Alert. No gross deficits  Psych: Mood appropriate.      Medications:   Medications:    vancomycin  1,250 mg IntraVENous Q12H    gabapentin  800 mg Oral TID    Vitamin D  1,000 Units Oral Daily    vitamin B-12  500 mcg Oral Daily    miconazole   Topical BID    sodium chloride flush  5-40 mL IntraVENous 2 times per day    enoxaparin  1 mg/kg SubCUTAneous BID    ondansetron  4 mg IntraVENous 4 times per day    PARoxetine  30 mg Oral Daily    hydrOXYzine HCl  50 mg Oral BID    levothyroxine  125 mcg Oral Daily    levETIRAcetam  1,000 mg Oral BID    pantoprazole  40 mg Oral BID AC      Infusions:    lactated ringers 125 mL/hr at 09/23/22 0611    sodium chloride       PRN Meds: tiZANidine, 4 mg, Q8H PRN  sodium chloride flush, 5-40 mL, PRN  sodium chloride, , PRN  oxyCODONE, 5 mg, Q4H PRN Or  oxyCODONE, 10 mg, Q4H PRN  HYDROmorphone, 0.25 mg, Q3H PRN   Or  HYDROmorphone, 0.5 mg, Q3H PRN  ondansetron, 4 mg, Q8H PRN   Or  ondansetron, 4 mg, Q6H PRN  polyethylene glycol, 17 g, Daily PRN  acetaminophen, 650 mg, Q6H PRN   Or  acetaminophen, 650 mg, Q6H PRN  oxyCODONE-acetaminophen, 1 tablet, Q4H PRN  promethazine, 25 mg, Q6H PRN      Labs      Recent Labs     09/21/22 2104   WBC 4.3   HGB 9.2*   HCT 28.8*         Recent Labs     09/21/22 2104   *   K 3.2*      CO2 22   BUN 9   CREATININE 0.7     Recent Labs     09/21/22 2104   AST 23   ALT 13   BILITOT 0.4   ALKPHOS 114     Recent Labs     09/21/22 2104   INR 0.92     No results for input(s): CKTOTAL, CKMB, CKMBINDEX, TROPONINT in the last 72 hours.   No results found for: LABA1C  CALCIUM:  9.6/22 (09/21 2104)  No results found for: MG        Electronically signed by Dewitt Essex, MD on 9/23/2022 at 12:50 PM

## 2022-09-23 NOTE — PROGRESS NOTES
Huey Luis (1970)    Daily Progress Note-  Krystle Blackman DO,                  Today's Date:   9/23/2022          Subjective:     Patient seen and examined resting comfortably in bed this morning. No new complaints, did well overnight per nursing staff. Continues to have knee pain but again states that she is improving. Patient dressing was changed yesterday afternoon by the nursing staff due to some saturation but current dressing is without saturation. Ace wrap not in place  No new orthopedic complaints. No new questions about treatment plan. Pain controlled at this time as she is resting. Patient denies fever, chills, vomiting, shortness of breath, chest pain. Nausea is improved but still present. Patient continues to be up and ambulating with mild, slightly improving pain at the knee but has been able to do so. Objective:     Vitals:    09/23/22 0209   BP: 131/62   Pulse: 84   Resp: 20   Temp: 98.2 °F (36.8 °C)   SpO2: 100%        Review of Systems   Constitutional:  Positive for activity change. Negative for fatigue and fever. HENT:  Negative for sneezing, sore throat and voice change. Respiratory:  Negative for cough, shortness of breath and wheezing. Cardiovascular:  Negative for leg swelling. Gastrointestinal:  Negative for nausea and vomiting. Musculoskeletal:  Positive for arthralgias, joint swelling and myalgias. Negative for back pain, gait problem, neck pain and neck stiffness. Skin:  Negative for color change, rash and wound. Neurological:  Negative for weakness and numbness. Psychiatric/Behavioral:  Negative for behavioral problems, confusion and self-injury. Physical Exam:     The patient is awake and alert  Resting comfortably in bed    Operative extremity:    The dressing is clean dry and intact.   Dressing removed to pull drain this morning. There is no active drainage or bleeding. Drain pulled without issue. Sensation and motor function intact distally. Patient wiggles toes without difficulty  4 cm in diameter area at tibia shaft anteriorly that was marked with nursing staff due to redness yesterday but this is resolved at this time  No pain in joint above or below  Guarded range of motion of knee at this time but slightly improved. Pain with active and passive flexion but active motion is intact  No significant effusion  Compartments soft, compressible  Positive Petar's which is changed from yesterday   No concerning signs for infection including warmth, erythema, significant swelling      LABS   CBC:   Recent Labs     09/21/22  2104   WBC 4.3   HGB 9.2*          Preoperative right knee aspiration results - No change    Culture-aerobic and anaerobic demonstrate:   Prelim Report No growth to date No growth 36 to 48 hours No Aerobic or Anaerobic growth Holding for 3 weeks. Anaerobic culture further report to follow No anaerobes isolated so far, Further report to follow        Awaiting Gram stain    Crystals: NO CRYSTALS SEEN     Intraoperative fluid collection demonstrates:  Prelim Report No growth to date No growth 36 to 48 hours P with either collection    IMAGING     No new orthopedic imaging      Assessment and Plan     1. POD #3 s/p left knee arthroscopic irrigation and debridement    1:  Physical therapy consult for mobilization   -WBAT   -No precautions with the exception of no deep knee bending and no soaking of the wound  2:  DVT prophylaxis   -Continue DVT prophylaxis per hospitalist  3:  Continue Pain Control   -wean to PO meds  4.   Medical management per hospitalist service   -Continue to monitor Hgb   -Continue antibiotic treatment per their plan -currently on vancomycin   -No recommendations at this time from myself about specific antibiotics  -Spoke to hospitalist directly this morning about treatment plan, intraoperative findings  5:  D/C Planning:     -Per case management  6. Pain better controlled this morning  7. Please maintain compressive dressing. Spoke to patient about this  8. No further orthopedic intervention plan at this time. 9.  Orthopedics to sign off at this time. If patient remains hospitalized through the weekend, I will see her Monday for a postop week 1 appointment. Otherwise, I would like her to follow up in 1 week postop for evaluation and plan for suture removal around 2-3 weeks postop.  Please contact me if there are any issues regarding the patient's care and I will be happy to see the patient      Chika Allison DO

## 2022-09-23 NOTE — CARE COORDINATION
CM informed during IDR that pt will need   home care/ IV antibiotics . DERICK discussed with pt. Pt has had [address] [place] [address] Rd and would like them again  CM spoke with [facility] and they will follow for homecare and IV antibiotics.

## 2022-09-23 NOTE — PROGRESS NOTES
Physician Progress Note      Arsh Mills  CSN #:                  [id number]  :                       1970  ADMIT DATE:       2022 12:26 PM  [address] [place] DATE:  RESPONDING  PROVIDER #:        Maria Victoria Mejia MD          QUERY TEXT:    Pt admitted with pyogenic arthritis of L knee and has anemia documented. If   possible, please document in progress notes and discharge summary further   specificity regarding the acuity and type of anemia:      The medical record reflects the following:  Risk Factors: recent OR procedure, PMH of Crohn's  Clinical Indicators: On arrival, 22, Hgb was 10.6. Dropped to 9.2 as of   22. Pt denies bloody emesis or melanotic stools. Treatment: Monitor H/H    Thank you,  Annia Stokes, RN  [phone]  Options provided:  -- Anemia due to acute blood loss  -- Anemia due to chronic blood loss  -- Anemia due to acute on chronic blood loss  -- Anemia due to iron deficiency  -- Anemia due to postoperative blood loss  -- Other - I will add my own diagnosis  -- Disagree - Not applicable / Not valid  -- Disagree - Clinically unable to determine / Unknown  -- Refer to Clinical Documentation Reviewer    PROVIDER RESPONSE TEXT:    This patient has chronic blood loss anemia.     Query created by: Ming Gunderson on 2022 1:14 PM      Electronically signed by:  Maria Victoria Mejia MD 2022 7:20 AM

## 2022-09-23 NOTE — CARE COORDINATION
[address] [facility] Liaison spoke with pt and pt is agreeable to hhc at discharge if need IV atbs. If no atbs are needed she doesn't want hhc. Pt [facility] friend [place]  [facility] [address] [facility] [address] She will get address and notify hhc. PCP BECKI Royal.  Please place inpatient consult to home health needs order in [facility] [place].

## 2022-09-23 NOTE — PROGRESS NOTES
[address] [facility]  consulted by Dr. Valentino Finer for monitoring and adjustment. Indication for treatment: bone and joint infection  Goal trough: [] 10-15 mcg/mL or [x] 15-20 mcg/ml  AUC/GLORIA: [] <500 or [x] 400-600    Pertinent Laboratory Values:   Temp Readings from Last 3 Encounters:   09/23/22 98.4 °F (36.9 °C) (Oral)     Recent Labs     09/21/22  2104   WBC 4.3       Recent Labs     09/21/22  2104   BUN 9   CREATININE 0.7       Estimated Creatinine Clearance: 112 mL/min (based on SCr of 0.7 mg/dL). Intake/Output Summary (Last 24 hours) at 9/23/2022 1557  Last data filed at 9/22/2022 2115  Gross per 24 hour   Intake 120 ml   Output --   Net 120 ml       Pertinent Cultures:  Date    Source    Results  9/19                             Synovial fluid                         In process      Vancomycin level:   TROUGH:  No results for input(s): VANCOTROUGH in the last 72 hours. RANDOM:    Recent Labs     09/22/22  0641   VANCORANDOM 11.8         Assessment:  SCr, BUN, and urine output: SCR has been close to baseline, No UOP data  LEFT KNEE ARTHROSCOPIC IRRIGATION AND DEBRIDEMENT WITH DRAIN PLACEMENT (performed by surgery)  Day(s) of therapy: 5  Vancomycin concentration:   9/22 - level = 11.8 - 10.5 hrs after vanco 1gm q12hr dose, AUC =407 - change dose    Plan:  Renal trends have been close to baseline. Continue vancomycin to 1250mg Q12h with a predicted AUC of 509 at steady state  Recheck the vanco level tomorrow am.  Pharmacy will continue to monitor patient and adjust therapy as indicated    Apoorva 3 09/24 @06:00    Thank you for the consult. Paloma George, [address] [place] [address]  9/23/2022 3:57 PM

## 2022-09-24 LAB
CREAT SERPL-MCNC: 0.5 MG/DL (ref 0.6–1.1)
DOSE AMOUNT: NORMAL
DOSE TIME: NORMAL
ERYTHROCYTE SEDIMENTATION RATE: 60 MM/HR (ref 0–30)
GFR AFRICAN AMERICAN: >60 ML/MIN/1.73M2
GFR NON-AFRICAN AMERICAN: >60 ML/MIN/1.73M2
HIGH SENSITIVE C-REACTIVE PROTEIN: 138.7 MG/L (ref 0–5)
VANCOMYCIN RANDOM: 10.3 UG/ML

## 2022-09-24 PROCEDURE — 97530 THERAPEUTIC ACTIVITIES: CPT

## 2022-09-24 PROCEDURE — 2580000003 HC RX 258: Performed by: HOSPITALIST

## 2022-09-24 PROCEDURE — 6370000000 HC RX 637 (ALT 250 FOR IP): Performed by: STUDENT IN AN ORGANIZED HEALTH CARE EDUCATION/TRAINING PROGRAM

## 2022-09-24 PROCEDURE — 6370000000 HC RX 637 (ALT 250 FOR IP): Performed by: HOSPITALIST

## 2022-09-24 PROCEDURE — 6360000002 HC RX W HCPCS: Performed by: STUDENT IN AN ORGANIZED HEALTH CARE EDUCATION/TRAINING PROGRAM

## 2022-09-24 PROCEDURE — 6360000002 HC RX W HCPCS: Performed by: HOSPITALIST

## 2022-09-24 PROCEDURE — 36415 COLL VENOUS BLD VENIPUNCTURE: CPT

## 2022-09-24 PROCEDURE — 97535 SELF CARE MNGMENT TRAINING: CPT

## 2022-09-24 PROCEDURE — 1200000000 HC SEMI PRIVATE

## 2022-09-24 PROCEDURE — 2580000003 HC RX 258: Performed by: STUDENT IN AN ORGANIZED HEALTH CARE EDUCATION/TRAINING PROGRAM

## 2022-09-24 PROCEDURE — 2500000003 HC RX 250 WO HCPCS: Performed by: HOSPITALIST

## 2022-09-24 PROCEDURE — 94761 N-INVAS EAR/PLS OXIMETRY MLT: CPT

## 2022-09-24 PROCEDURE — 80202 ASSAY OF VANCOMYCIN: CPT

## 2022-09-24 PROCEDURE — 6360000002 HC RX W HCPCS: Performed by: INTERNAL MEDICINE

## 2022-09-24 PROCEDURE — 85652 RBC SED RATE AUTOMATED: CPT

## 2022-09-24 PROCEDURE — 82565 ASSAY OF CREATININE: CPT

## 2022-09-24 PROCEDURE — 86141 C-REACTIVE PROTEIN HS: CPT

## 2022-09-24 PROCEDURE — 2580000003 HC RX 258: Performed by: INTERNAL MEDICINE

## 2022-09-24 RX ORDER — PREDNISONE 20 MG/1
40 TABLET ORAL DAILY
Status: DISCONTINUED | OUTPATIENT
Start: 2022-09-25 | End: 2022-10-02

## 2022-09-24 RX ORDER — SCOLOPAMINE TRANSDERMAL SYSTEM 1 MG/1
1 PATCH, EXTENDED RELEASE TRANSDERMAL
Status: DISCONTINUED | OUTPATIENT
Start: 2022-09-24 | End: 2022-10-05 | Stop reason: HOSPADM

## 2022-09-24 RX ADMIN — MICONAZOLE NITRATE: 2 POWDER TOPICAL at 21:23

## 2022-09-24 RX ADMIN — VANCOMYCIN HYDROCHLORIDE 1250 MG: 1.25 INJECTION, POWDER, LYOPHILIZED, FOR SOLUTION INTRAVENOUS at 09:51

## 2022-09-24 RX ADMIN — HYDROXYZINE HYDROCHLORIDE 50 MG: 50 TABLET, FILM COATED ORAL at 09:33

## 2022-09-24 RX ADMIN — SODIUM CHLORIDE, PRESERVATIVE FREE 10 ML: 5 INJECTION INTRAVENOUS at 09:36

## 2022-09-24 RX ADMIN — GABAPENTIN 800 MG: 400 CAPSULE ORAL at 20:57

## 2022-09-24 RX ADMIN — HYDROMORPHONE HYDROCHLORIDE 0.5 MG: 1 INJECTION, SOLUTION INTRAMUSCULAR; INTRAVENOUS; SUBCUTANEOUS at 05:52

## 2022-09-24 RX ADMIN — ONDANSETRON 4 MG: 2 INJECTION INTRAMUSCULAR; INTRAVENOUS at 05:51

## 2022-09-24 RX ADMIN — ENOXAPARIN SODIUM 105 MG: 150 INJECTION SUBCUTANEOUS at 09:34

## 2022-09-24 RX ADMIN — GABAPENTIN 800 MG: 400 CAPSULE ORAL at 09:34

## 2022-09-24 RX ADMIN — TIZANIDINE 4 MG: 4 TABLET ORAL at 02:52

## 2022-09-24 RX ADMIN — OXYCODONE HYDROCHLORIDE 10 MG: 10 TABLET ORAL at 13:37

## 2022-09-24 RX ADMIN — HYDROMORPHONE HYDROCHLORIDE 0.5 MG: 1 INJECTION, SOLUTION INTRAMUSCULAR; INTRAVENOUS; SUBCUTANEOUS at 00:05

## 2022-09-24 RX ADMIN — HYDROMORPHONE HYDROCHLORIDE 0.5 MG: 1 INJECTION, SOLUTION INTRAMUSCULAR; INTRAVENOUS; SUBCUTANEOUS at 16:22

## 2022-09-24 RX ADMIN — PANTOPRAZOLE SODIUM 40 MG: 40 TABLET, DELAYED RELEASE ORAL at 05:52

## 2022-09-24 RX ADMIN — OXYCODONE HYDROCHLORIDE 10 MG: 10 TABLET ORAL at 20:57

## 2022-09-24 RX ADMIN — LEVETIRACETAM 1000 MG: 500 TABLET, FILM COATED ORAL at 09:34

## 2022-09-24 RX ADMIN — HYDROXYZINE HYDROCHLORIDE 50 MG: 50 TABLET, FILM COATED ORAL at 20:57

## 2022-09-24 RX ADMIN — PAROXETINE HYDROCHLORIDE 30 MG: 20 TABLET, FILM COATED ORAL at 09:34

## 2022-09-24 RX ADMIN — MICONAZOLE NITRATE: 2 POWDER TOPICAL at 00:04

## 2022-09-24 RX ADMIN — GABAPENTIN 800 MG: 400 CAPSULE ORAL at 13:37

## 2022-09-24 RX ADMIN — VANCOMYCIN HYDROCHLORIDE 1500 MG: 10 INJECTION, POWDER, LYOPHILIZED, FOR SOLUTION INTRAVENOUS at 21:20

## 2022-09-24 RX ADMIN — MICONAZOLE NITRATE: 2 POWDER TOPICAL at 12:35

## 2022-09-24 RX ADMIN — HYDROMORPHONE HYDROCHLORIDE 0.5 MG: 1 INJECTION, SOLUTION INTRAMUSCULAR; INTRAVENOUS; SUBCUTANEOUS at 09:35

## 2022-09-24 RX ADMIN — ONDANSETRON 4 MG: 2 INJECTION INTRAMUSCULAR; INTRAVENOUS at 17:06

## 2022-09-24 RX ADMIN — PREDNISONE 20 MG: 20 TABLET ORAL at 09:34

## 2022-09-24 RX ADMIN — PANTOPRAZOLE SODIUM 40 MG: 40 TABLET, DELAYED RELEASE ORAL at 17:06

## 2022-09-24 RX ADMIN — LEVETIRACETAM 1000 MG: 500 TABLET, FILM COATED ORAL at 20:57

## 2022-09-24 RX ADMIN — LEVOTHYROXINE SODIUM 125 MCG: 25 TABLET ORAL at 05:52

## 2022-09-24 RX ADMIN — CYANOCOBALAMIN TAB 1000 MCG 500 MCG: 1000 TAB at 09:33

## 2022-09-24 RX ADMIN — ENOXAPARIN SODIUM 105 MG: 150 INJECTION SUBCUTANEOUS at 21:18

## 2022-09-24 RX ADMIN — HYDROMORPHONE HYDROCHLORIDE 0.5 MG: 1 INJECTION, SOLUTION INTRAMUSCULAR; INTRAVENOUS; SUBCUTANEOUS at 12:32

## 2022-09-24 RX ADMIN — HYDROMORPHONE HYDROCHLORIDE 0.5 MG: 1 INJECTION, SOLUTION INTRAMUSCULAR; INTRAVENOUS; SUBCUTANEOUS at 22:37

## 2022-09-24 RX ADMIN — Medication 1000 UNITS: at 09:34

## 2022-09-24 RX ADMIN — ONDANSETRON 4 MG: 2 INJECTION INTRAMUSCULAR; INTRAVENOUS at 12:32

## 2022-09-24 ASSESSMENT — PAIN DESCRIPTION - DESCRIPTORS
DESCRIPTORS: ACHING;CRAMPING;THROBBING
DESCRIPTORS: ACHING;THROBBING
DESCRIPTORS: ACHING;THROBBING
DESCRIPTORS: SQUEEZING;THROBBING
DESCRIPTORS: ACHING;THROBBING

## 2022-09-24 ASSESSMENT — PAIN SCALES - GENERAL
PAINLEVEL_OUTOF10: 7
PAINLEVEL_OUTOF10: 9
PAINLEVEL_OUTOF10: 7
PAINLEVEL_OUTOF10: 7
PAINLEVEL_OUTOF10: 4
PAINLEVEL_OUTOF10: 7
PAINLEVEL_OUTOF10: 8
PAINLEVEL_OUTOF10: 8

## 2022-09-24 ASSESSMENT — PAIN DESCRIPTION - ORIENTATION
ORIENTATION: LEFT

## 2022-09-24 ASSESSMENT — PAIN DESCRIPTION - LOCATION
LOCATION: ARM;LEG
LOCATION: LEG;KNEE
LOCATION: LEG
LOCATION: LEG;KNEE
LOCATION: LEG;KNEE
LOCATION: LEG
LOCATION: LEG;KNEE

## 2022-09-24 ASSESSMENT — PAIN DESCRIPTION - PAIN TYPE: TYPE: CHRONIC PAIN

## 2022-09-24 NOTE — PROGRESS NOTES
[address] [facility]  consulted by Dr. Rosemarie Rapp for monitoring and adjustment. Indication for treatment: bone and joint infection  Goal trough: [] 10-15 mcg/mL or [x] 15-20 mcg/ml  AUC/GLORIA: [] <500 or [x] 400-600    Pertinent Laboratory Values:   Temp Readings from Last 3 Encounters:   09/24/22 98.4 °F (36.9 °C) (Oral)     Recent Labs     09/21/22  2104   WBC 4.3       Recent Labs     09/21/22  2104 09/24/22  0841   BUN 9  --    CREATININE 0.7 0.5*       Estimated Creatinine Clearance: 156 mL/min (A) (based on SCr of 0.5 mg/dL (L)). No intake or output data in the 24 hours ending 09/24/22 1251    Pertinent Cultures:  Date    Source    Results  9/19                             Synovial fluid                         No growth      Vancomycin level:   TROUGH:  No results for input(s): VANCOTROUGH in the last 72 hours. RANDOM:    Recent Labs     09/22/22  0641 09/24/22  0841   VANCORANDOM 11.8 10.3         Assessment:  SCr, BUN, and urine output: SCR remains close to baseline, No UOP data  LEFT KNEE ARTHROSCOPIC IRRIGATION AND DEBRIDEMENT WITH DRAIN PLACEMENT (performed by surgery)  Day(s) of therapy: 6  Vancomycin concentration:   9/22 - level = 11.8 - 10.5 hrs after vanco 1gm q12hr dose, AUC =407 - change dose  9/24 - 10.3, 12 hour level on 1250mg q12h,     Plan:  Renal trends remain close to baseline. Vanco level this am predicts and AUC below target on the current regimen of 1250mg q12h. Increase to vancomycin 1500mg ivpb q12h for a predicted AUC of 437 at steady state. Recheck the vanco level in 2 days  Pharmacy will continue to monitor patient and adjust therapy as indicated    Apoorva 3 09/26 @06:00    Thank you for the consult. Michelle Mcdonough, [facility]  9/24/2022 12:51 PM

## 2022-09-24 NOTE — PROGRESS NOTES
Occupational Therapy    Occupational Therapy Treatment Note    Name: Justine Maza MRN: [id number] :   1970   Date:  2022   Admission Date: 2022 Room:  [address] [place] [address]     Restrictions/Precautions:          fall risk, general precautions, contact precautions, L LE WBAT - ROM okay    Communication with other providers: RN handoff     Subjective:  Patient states:  I'm pretty self sufficient. I have to be  Pain:   Location, Type, Intensity (0/10 to 10/10):  7/10 L knee pain    Objective:    Observation: supine in bed, agreeable. Nurse present   Objective Measures:  Vitals WNL on room air    Treatment, including education:    Therapeutic Activity Training:   Therapeutic activity training was instructed today. Cues were given for safety, sequence, UE/LE placement, awareness, and balance. Activities performed today included bed mobility training, sup-sit, sit-stand, ambulation w/ RW. Supine to sitting EOB w/ supervision. STS from EOB w/ supervision. Ambulated short functional household distance x2 bouts using RW w/ SBA. Stand to sit EOB w/ supervision. Sit to supine w/ supervision. Self Care Training:   Cues were given for safety, sequence, UE/LE placement, visual cues, and balance. Activities performed today included toileting, hand hygiene. Toileting routine including transfer, hygiene, and clothing mgmt w/ supervision. Hand hygiene while standing at the sink w/ supervision. Left supine in bed, all needs met. Call light within reach.      Assessment / Impression:    Patient's tolerance of treatment: fair  Adverse Reaction: none  Significant change in status and impact:  none  Barriers to improvement: pain levels w/ activity      Plan for Next Session:    Cont w/ OT POC and functional goals, address safety/independence with ADLs and transfers/mobility    Time in:  1217  Time out:  1245  Timed treatment minutes:  28  Total treatment time:  28      Electronically signed by: Tj Espinal, OT,   9/24/2022, 12:19 PM

## 2022-09-24 NOTE — PLAN OF CARE
Problem: Discharge Planning  Goal: Discharge to home or other facility with appropriate resources  Outcome: Progressing     Problem: Pain  Goal: Verbalizes/displays adequate comfort level or baseline comfort level  Outcome: Progressing     Problem: ABCDS Injury Assessment  Goal: Absence of physical injury  Outcome: Progressing     Problem: Safety - Adult  Goal: Free from fall injury  Outcome: Progressing     Problem: Discharge Planning  Goal: Discharge to home or other facility with appropriate resources  Outcome: Progressing     Problem: Pain  Goal: Verbalizes/displays adequate comfort level or baseline comfort level  Outcome: Progressing     Problem: ABCDS Injury Assessment  Goal: Absence of physical injury  Outcome: Progressing     Problem: Safety - Adult  Goal: Free from fall injury  Outcome: Progressing

## 2022-09-24 NOTE — PROGRESS NOTES
V2.0  [facility] Hospitalist Progress Note      Name:  Rolando Rodas /Age/Sex: 1970  (46 y.o. female)   MRN & CSN:  [id number] & [id number] Encounter Date/Time: 2022 6:16 PM EDT    Location:  [address] [place] [address] PCP: No primary care provider on file. Hospital Day: 6    Assessment and Plan:   Rolando Rodas is a 46 y.o. female who presents with left knee septic joint      Plan:  Left knee effusion concern for septic joint  -I&D . On Vanco and cefazolin. Ortho following. CRP 6.5.  - Crystals negative. With elevated WBCs on synovial fluid and a high neutrophil count, differential includes inflammatory versus septic arthritis. Patient states she does have lupus, but is not currently on any medications at this time.  -Drain removed . Blood cultures pending, lab having difficulty with drawing blood from patient. Left knee swab cultures : NGTD. Synovial culture : NGTD. Patient denies any history of IV drug use or STDs. -D/W Ortho, orthopedics to sign off and recommend follow-up in 1 week, with suture removal around 2-3 weeks postop. -BCx : NGTD. Will [facility] with [facility] for IV antibiotic recommendations for home. Intractable nausea and vomiting, chronic pneumobilia  -On antiemetics. -GI following. GI disease panel and C. difficile ordered. - CT abdomen pelvis : Stable pneumobilia; punctate nonobstructing left renal calculus; no acute abnormality.  -Starting scopolamine patch which patient takes at home. Acute nonocclusive DVT of left distal femoral and popliteal veins  -on therapeutic Lovenox. Anemia  -Hemoglobin stable around 9. Arthritis  -Patient states she feels like she is having a flareup of her lupus in her joints. - Patient says prednisone is helping. Will increase dose. Seizure disorder  Hypertension  History of left foot infection/osteomyelitis    Diet ADULT DIET;  Regular    DVT Prophylaxis  [x]Lovenox, []  Heparin, [] SCDs, [] Ambulation,  [] Eliquis, [] Xarelto  [] Coumadin   Code Status Full Code   Disposition From: home  Expected Disposition: Home with home health care  Estimated Date of Discharge: 9/26  Patient requires continued admission due to knee swelling. Subjective/Interval history:   Chief Complaint: No chief complaint on file. She feels her joints are doing a little better today     Review of Systems:    Negative unless mentioned above    Objective:   No intake or output data in the 24 hours ending 09/24/22 1659       Vitals:   BP (!) 161/73   Pulse 93   Temp 98.1 °F (36.7 °C) (Oral)   Resp 16   Ht 5' 4\" (1.626 m)   Wt 238 lb (108 kg)   SpO2 96%   BMI 40.85 kg/m²     Physical Exam:   General: NAD, sitting in chair  Eyes: no discharge  HENT: NCAT  Cardiovascular: RRR  Respiratory: Clear to auscultation b/l bs+  Gastrointestinal: Soft, non tender, nondistended  Genitourinary: no suprapubic tenderness  Musculoskeletal: left knee is wrapped  Skin: warm, dry, no gross lesions, bruise in left leg  Neuro: Alert. No gross deficits  Psych: Mood appropriate.      Medications:   Medications:    vancomycin  1,500 mg IntraVENous Q12H    scopolamine  1 patch TransDERmal Q72H    [START ON 9/25/2022] predniSONE  40 mg Oral Daily    gabapentin  800 mg Oral TID    Vitamin D  1,000 Units Oral Daily    vitamin B-12  500 mcg Oral Daily    miconazole   Topical BID    sodium chloride flush  5-40 mL IntraVENous 2 times per day    enoxaparin  1 mg/kg SubCUTAneous BID    ondansetron  4 mg IntraVENous 4 times per day    PARoxetine  30 mg Oral Daily    hydrOXYzine HCl  50 mg Oral BID    levothyroxine  125 mcg Oral Daily    levETIRAcetam  1,000 mg Oral BID    pantoprazole  40 mg Oral BID AC      Infusions:    lactated ringers 125 mL/hr at 09/23/22 1709    sodium chloride       PRN Meds: tiZANidine, 4 mg, Q8H PRN  sodium chloride flush, 5-40 mL, PRN  sodium chloride, , PRN  oxyCODONE, 5 mg, Q4H PRN   Or  oxyCODONE, 10 mg, Q4H PRN  HYDROmorphone, 0.25 mg, Q3H PRN   Or  HYDROmorphone, 0.5 mg, Q3H PRN  ondansetron, 4 mg, Q8H PRN   Or  ondansetron, 4 mg, Q6H PRN  polyethylene glycol, 17 g, Daily PRN  acetaminophen, 650 mg, Q6H PRN   Or  acetaminophen, 650 mg, Q6H PRN  oxyCODONE-acetaminophen, 1 tablet, Q4H PRN  promethazine, 25 mg, Q6H PRN      Labs      Recent Labs     09/21/22 2104   WBC 4.3   HGB 9.2*   HCT 28.8*         Recent Labs     09/21/22 2104 09/24/22  0841   *  --    K 3.2*  --      --    CO2 22  --    BUN 9  --    CREATININE 0.7 0.5*     Recent Labs     09/21/22 2104   AST 23   ALT 13   BILITOT 0.4   ALKPHOS 114     Recent Labs     09/21/22 2104   INR 0.92     No results for input(s): CKTOTAL, CKMB, CKMBINDEX, TROPONINT in the last 72 hours.   No results found for: LABA1C  CALCIUM:  9.6/22 (09/21 2104)  No results found for: MG        Electronically signed by Sintia Nassar MD on 9/24/2022 at 4:59 PM

## 2022-09-25 ENCOUNTER — APPOINTMENT (OUTPATIENT)
Dept: GENERAL RADIOLOGY | Age: 52
DRG: 486 | End: 2022-09-25
Payer: COMMERCIAL

## 2022-09-25 LAB
ANION GAP SERPL CALCULATED.3IONS-SCNC: 8 MMOL/L (ref 4–16)
BUN BLDV-MCNC: 7 MG/DL (ref 6–23)
CALCIUM SERPL-MCNC: 9.1 MG/DL (ref 8.3–10.6)
CHLORIDE BLD-SCNC: 107 MMOL/L (ref 99–110)
CO2: 25 MMOL/L (ref 21–32)
CREAT SERPL-MCNC: 0.6 MG/DL (ref 0.6–1.1)
CULTURE: NORMAL
GFR AFRICAN AMERICAN: >60 ML/MIN/1.73M2
GFR NON-AFRICAN AMERICAN: >60 ML/MIN/1.73M2
GLUCOSE BLD-MCNC: 174 MG/DL (ref 70–99)
GLUCOSE BLD-MCNC: 87 MG/DL (ref 70–99)
HCT VFR BLD CALC: 22.6 % (ref 37–47)
HCT VFR BLD CALC: 26.4 % (ref 37–47)
HEMOCCULT SP1 STL QL: POSITIVE
HEMOGLOBIN: 6.9 GM/DL (ref 12.5–16)
HEMOGLOBIN: 8.3 GM/DL (ref 12.5–16)
Lab: NORMAL
MCH RBC QN AUTO: 26.6 PG (ref 27–31)
MCHC RBC AUTO-ENTMCNC: 30.5 % (ref 32–36)
MCV RBC AUTO: 87.3 FL (ref 78–100)
PDW BLD-RTO: 13.2 % (ref 11.7–14.9)
PLATELET # BLD: 192 K/CU MM (ref 140–440)
PMV BLD AUTO: 10.4 FL (ref 7.5–11.1)
POTASSIUM SERPL-SCNC: 3.9 MMOL/L (ref 3.5–5.1)
RBC # BLD: 2.59 M/CU MM (ref 4.2–5.4)
SODIUM BLD-SCNC: 140 MMOL/L (ref 135–145)
SPECIMEN: NORMAL
WBC # BLD: 3.8 K/CU MM (ref 4–10.5)

## 2022-09-25 PROCEDURE — 86901 BLOOD TYPING SEROLOGIC RH(D): CPT

## 2022-09-25 PROCEDURE — 94761 N-INVAS EAR/PLS OXIMETRY MLT: CPT

## 2022-09-25 PROCEDURE — 2580000003 HC RX 258: Performed by: STUDENT IN AN ORGANIZED HEALTH CARE EDUCATION/TRAINING PROGRAM

## 2022-09-25 PROCEDURE — 85018 HEMOGLOBIN: CPT

## 2022-09-25 PROCEDURE — 6360000002 HC RX W HCPCS: Performed by: HOSPITALIST

## 2022-09-25 PROCEDURE — 86850 RBC ANTIBODY SCREEN: CPT

## 2022-09-25 PROCEDURE — 6360000002 HC RX W HCPCS: Performed by: STUDENT IN AN ORGANIZED HEALTH CARE EDUCATION/TRAINING PROGRAM

## 2022-09-25 PROCEDURE — 82962 GLUCOSE BLOOD TEST: CPT

## 2022-09-25 PROCEDURE — 85027 COMPLETE CBC AUTOMATED: CPT

## 2022-09-25 PROCEDURE — 84484 ASSAY OF TROPONIN QUANT: CPT

## 2022-09-25 PROCEDURE — 93005 ELECTROCARDIOGRAM TRACING: CPT | Performed by: HOSPITALIST

## 2022-09-25 PROCEDURE — 6370000000 HC RX 637 (ALT 250 FOR IP): Performed by: STUDENT IN AN ORGANIZED HEALTH CARE EDUCATION/TRAINING PROGRAM

## 2022-09-25 PROCEDURE — 6370000000 HC RX 637 (ALT 250 FOR IP): Performed by: HOSPITALIST

## 2022-09-25 PROCEDURE — 2580000003 HC RX 258: Performed by: INTERNAL MEDICINE

## 2022-09-25 PROCEDURE — 86900 BLOOD TYPING SEROLOGIC ABO: CPT

## 2022-09-25 PROCEDURE — 80048 BASIC METABOLIC PNL TOTAL CA: CPT

## 2022-09-25 PROCEDURE — 2500000003 HC RX 250 WO HCPCS: Performed by: HOSPITALIST

## 2022-09-25 PROCEDURE — 85014 HEMATOCRIT: CPT

## 2022-09-25 PROCEDURE — 1200000000 HC SEMI PRIVATE

## 2022-09-25 PROCEDURE — 36592 COLLECT BLOOD FROM PICC: CPT

## 2022-09-25 PROCEDURE — 86922 COMPATIBILITY TEST ANTIGLOB: CPT

## 2022-09-25 PROCEDURE — 71045 X-RAY EXAM CHEST 1 VIEW: CPT

## 2022-09-25 PROCEDURE — P9016 RBC LEUKOCYTES REDUCED: HCPCS

## 2022-09-25 PROCEDURE — 36430 TRANSFUSION BLD/BLD COMPNT: CPT

## 2022-09-25 PROCEDURE — 6360000002 HC RX W HCPCS: Performed by: INTERNAL MEDICINE

## 2022-09-25 PROCEDURE — 82270 OCCULT BLOOD FECES: CPT

## 2022-09-25 RX ORDER — FUROSEMIDE 10 MG/ML
40 INJECTION INTRAMUSCULAR; INTRAVENOUS ONCE
Status: COMPLETED | OUTPATIENT
Start: 2022-09-25 | End: 2022-09-25

## 2022-09-25 RX ORDER — SODIUM CHLORIDE 9 MG/ML
INJECTION, SOLUTION INTRAVENOUS PRN
Status: DISCONTINUED | OUTPATIENT
Start: 2022-09-25 | End: 2022-10-05 | Stop reason: HOSPADM

## 2022-09-25 RX ADMIN — GABAPENTIN 800 MG: 400 CAPSULE ORAL at 20:45

## 2022-09-25 RX ADMIN — OXYCODONE HYDROCHLORIDE 5 MG: 5 TABLET ORAL at 05:46

## 2022-09-25 RX ADMIN — OXYCODONE HYDROCHLORIDE 10 MG: 10 TABLET ORAL at 12:56

## 2022-09-25 RX ADMIN — ENOXAPARIN SODIUM 105 MG: 150 INJECTION SUBCUTANEOUS at 10:34

## 2022-09-25 RX ADMIN — PANTOPRAZOLE SODIUM 40 MG: 40 TABLET, DELAYED RELEASE ORAL at 14:47

## 2022-09-25 RX ADMIN — SODIUM CHLORIDE, POTASSIUM CHLORIDE, SODIUM LACTATE AND CALCIUM CHLORIDE: 600; 310; 30; 20 INJECTION, SOLUTION INTRAVENOUS at 01:24

## 2022-09-25 RX ADMIN — OXYCODONE HYDROCHLORIDE 10 MG: 10 TABLET ORAL at 01:20

## 2022-09-25 RX ADMIN — HYDROMORPHONE HYDROCHLORIDE 0.5 MG: 1 INJECTION, SOLUTION INTRAMUSCULAR; INTRAVENOUS; SUBCUTANEOUS at 06:49

## 2022-09-25 RX ADMIN — PAROXETINE HYDROCHLORIDE 30 MG: 20 TABLET, FILM COATED ORAL at 10:30

## 2022-09-25 RX ADMIN — LEVOTHYROXINE SODIUM 125 MCG: 25 TABLET ORAL at 05:47

## 2022-09-25 RX ADMIN — HYDROMORPHONE HYDROCHLORIDE 0.5 MG: 1 INJECTION, SOLUTION INTRAMUSCULAR; INTRAVENOUS; SUBCUTANEOUS at 16:09

## 2022-09-25 RX ADMIN — ONDANSETRON 4 MG: 2 INJECTION INTRAMUSCULAR; INTRAVENOUS at 05:47

## 2022-09-25 RX ADMIN — VANCOMYCIN HYDROCHLORIDE 1500 MG: 10 INJECTION, POWDER, LYOPHILIZED, FOR SOLUTION INTRAVENOUS at 22:46

## 2022-09-25 RX ADMIN — OXYCODONE HYDROCHLORIDE 10 MG: 10 TABLET ORAL at 20:45

## 2022-09-25 RX ADMIN — HYDROXYZINE HYDROCHLORIDE 50 MG: 50 TABLET, FILM COATED ORAL at 10:30

## 2022-09-25 RX ADMIN — PANTOPRAZOLE SODIUM 40 MG: 40 TABLET, DELAYED RELEASE ORAL at 05:47

## 2022-09-25 RX ADMIN — HYDROXYZINE HYDROCHLORIDE 50 MG: 50 TABLET, FILM COATED ORAL at 20:45

## 2022-09-25 RX ADMIN — ONDANSETRON 4 MG: 2 INJECTION INTRAMUSCULAR; INTRAVENOUS at 01:20

## 2022-09-25 RX ADMIN — HYDROMORPHONE HYDROCHLORIDE 0.5 MG: 1 INJECTION, SOLUTION INTRAMUSCULAR; INTRAVENOUS; SUBCUTANEOUS at 03:43

## 2022-09-25 RX ADMIN — HYDROMORPHONE HYDROCHLORIDE 0.5 MG: 1 INJECTION, SOLUTION INTRAMUSCULAR; INTRAVENOUS; SUBCUTANEOUS at 22:42

## 2022-09-25 RX ADMIN — SODIUM CHLORIDE, PRESERVATIVE FREE 10 ML: 5 INJECTION INTRAVENOUS at 12:57

## 2022-09-25 RX ADMIN — LEVETIRACETAM 1000 MG: 500 TABLET, FILM COATED ORAL at 20:45

## 2022-09-25 RX ADMIN — GABAPENTIN 800 MG: 400 CAPSULE ORAL at 10:31

## 2022-09-25 RX ADMIN — GABAPENTIN 800 MG: 400 CAPSULE ORAL at 14:47

## 2022-09-25 RX ADMIN — LEVETIRACETAM 1000 MG: 500 TABLET, FILM COATED ORAL at 10:29

## 2022-09-25 RX ADMIN — FUROSEMIDE 40 MG: 10 INJECTION, SOLUTION INTRAMUSCULAR; INTRAVENOUS at 20:53

## 2022-09-25 RX ADMIN — VANCOMYCIN HYDROCHLORIDE 1500 MG: 10 INJECTION, POWDER, LYOPHILIZED, FOR SOLUTION INTRAVENOUS at 10:48

## 2022-09-25 RX ADMIN — MICONAZOLE NITRATE: 2 POWDER TOPICAL at 20:59

## 2022-09-25 RX ADMIN — Medication 1000 UNITS: at 10:30

## 2022-09-25 RX ADMIN — SODIUM CHLORIDE, PRESERVATIVE FREE 10 ML: 5 INJECTION INTRAVENOUS at 22:00

## 2022-09-25 RX ADMIN — ENOXAPARIN SODIUM 105 MG: 150 INJECTION SUBCUTANEOUS at 22:41

## 2022-09-25 RX ADMIN — CYANOCOBALAMIN TAB 1000 MCG 500 MCG: 1000 TAB at 10:30

## 2022-09-25 RX ADMIN — PREDNISONE 40 MG: 20 TABLET ORAL at 10:31

## 2022-09-25 RX ADMIN — HYDROMORPHONE HYDROCHLORIDE 0.5 MG: 1 INJECTION, SOLUTION INTRAMUSCULAR; INTRAVENOUS; SUBCUTANEOUS at 10:33

## 2022-09-25 RX ADMIN — ONDANSETRON 4 MG: 2 INJECTION INTRAMUSCULAR; INTRAVENOUS at 12:56

## 2022-09-25 RX ADMIN — ONDANSETRON 4 MG: 2 INJECTION INTRAMUSCULAR; INTRAVENOUS at 23:21

## 2022-09-25 RX ADMIN — MICONAZOLE NITRATE: 2 POWDER TOPICAL at 12:56

## 2022-09-25 ASSESSMENT — PAIN DESCRIPTION - DESCRIPTORS
DESCRIPTORS: THROBBING;SHARP;BURNING
DESCRIPTORS: ACHING;CRAMPING;DISCOMFORT
DESCRIPTORS: ACHING;CRAMPING
DESCRIPTORS: THROBBING;SHARP;BURNING
DESCRIPTORS: THROBBING;SHARP;BURNING

## 2022-09-25 ASSESSMENT — PAIN SCALES - GENERAL
PAINLEVEL_OUTOF10: 10
PAINLEVEL_OUTOF10: 8
PAINLEVEL_OUTOF10: 10
PAINLEVEL_OUTOF10: 9
PAINLEVEL_OUTOF10: 10
PAINLEVEL_OUTOF10: 10

## 2022-09-25 ASSESSMENT — PAIN DESCRIPTION - ORIENTATION
ORIENTATION: LEFT
ORIENTATION: LEFT
ORIENTATION: RIGHT
ORIENTATION: LEFT

## 2022-09-25 ASSESSMENT — PAIN DESCRIPTION - LOCATION
LOCATION: LEG
LOCATION: KNEE
LOCATION: LEG
LOCATION: KNEE;LEG
LOCATION: KNEE

## 2022-09-25 ASSESSMENT — PAIN - FUNCTIONAL ASSESSMENT
PAIN_FUNCTIONAL_ASSESSMENT: ACTIVITIES ARE NOT PREVENTED
PAIN_FUNCTIONAL_ASSESSMENT: PREVENTS OR INTERFERES SOME ACTIVE ACTIVITIES AND ADLS
PAIN_FUNCTIONAL_ASSESSMENT: PREVENTS OR INTERFERES WITH ALL ACTIVE AND SOME PASSIVE ACTIVITIES
PAIN_FUNCTIONAL_ASSESSMENT: PREVENTS OR INTERFERES WITH ALL ACTIVE AND SOME PASSIVE ACTIVITIES
PAIN_FUNCTIONAL_ASSESSMENT: PREVENTS OR INTERFERES SOME ACTIVE ACTIVITIES AND ADLS
PAIN_FUNCTIONAL_ASSESSMENT: ACTIVITIES ARE NOT PREVENTED

## 2022-09-25 NOTE — PROGRESS NOTES
[address]  consulted by Dr. Uzma Pichardo for monitoring and adjustment. Indication for treatment: bone and joint infection  Goal trough: [] 10-15 mcg/mL or [x] 15-20 mcg/ml  AUC/GLORIA: [] <500 or [x] 400-600    Pertinent Laboratory Values:   Temp Readings from Last 3 Encounters:   09/25/22 98.1 °F (36.7 °C) (Oral)     Recent Labs     09/25/22  0600   WBC 3.8*       Recent Labs     09/24/22  0841 09/25/22  0600   BUN  --  7   CREATININE 0.5* 0.6       Estimated Creatinine Clearance: 130 mL/min (based on SCr of 0.6 mg/dL). No intake or output data in the 24 hours ending 09/25/22 1631    Pertinent Cultures:  Date    Source    Results  9/19                             Synovial fluid                         No growth      Vancomycin level:   TROUGH:  No results for input(s): VANCOTROUGH in the last 72 hours. RANDOM:    Recent Labs     09/24/22  0841   VANCORANDOM 10.3         Assessment:  SCr, BUN, and urine output: SCR remains close to baseline, No UOP data  LEFT KNEE ARTHROSCOPIC IRRIGATION AND DEBRIDEMENT WITH DRAIN PLACEMENT (performed by surgery)  Day(s) of therapy: 7  Vancomycin concentration:   9/22 - level = 11.8 - 10.5 hrs after vanco 1gm q12hr dose, AUC =407 - change dose  9/24 - 10.3, 12 hour level on 1250mg q12h,     Plan:  Renal trends remain close to baseline. Vanco level yesterday am predicted an AUC below target on a regimen of 1250mg q12h. Increased to vancomycin 1500mg ivpb q12h for a predicted AUC of 522 at steady state. Recheck the vanco level tomorrow am.  Pharmacy will continue to monitor patient and adjust therapy as indicated    [address] 3 9/26 @06:00    Thank you for the consult. Hunter Green, [address] [place] [address]  9/25/2022 4:31 PM

## 2022-09-25 NOTE — PROGRESS NOTES
Pt states that she has pressure in chest- blood was stopped. Pt stated she has had this feeling since earlier this afternoon- prior to starting transfusion. Blood restarted since pt having this prior to starting transfusion. VS stable, pt not in any visible stress, stated she feels like its from leg pain and anxiety. Dr. Diya Bradley notified. STAT EKG ordered- and completed.  Results sent to

## 2022-09-25 NOTE — PROGRESS NOTES
V2.0  [facility] Hospitalist Progress Note      Name:  Arielle Jeff /Age/Sex: 1970  (46 y.o. female)   MRN & CSN:  [id number] & [id number] Encounter Date/Time: 2022 6:16 PM EDT    Location:  [address] [place], [place] [id number] PCP: No primary care provider on file. Hospital Day: 7    Assessment and Plan:   Arielle Jeff is a 46 y.o. female who presents with left knee septic joint      Plan:  Left knee effusion concern for septic joint  -I&D . On Vanco and cefazolin. Ortho following.    - Crystals negative. With elevated WBCs on synovial fluid and a high neutrophil count, differential includes inflammatory versus septic arthritis. Patient states she does have lupus, but is not currently on any medications at this time.  -Drain removed . Left knee swab cultures : NGTD. Synovial culture : NGTD. Patient denies any history of IV drug use or STDs. -D/W Ortho, orthopedics to sign off and recommend follow-up in 1 week, with suture removal around 2-3 weeks postop. -BCx : NGTD. -Consult ID for antibiotic recommendations. . ESR 60. Intractable nausea and vomiting, chronic pneumobilia  -On antiemetics. -GI following. GI disease panel and C. difficile ordered. - CT abdomen pelvis : Stable pneumobilia; punctate nonobstructing left renal calculus; no acute abnormality.  -Started scopolamine patch which patient takes at home. Acute nonocclusive DVT of left distal femoral and popliteal veins  -on therapeutic Lovenox. Anemia  -Hemoglobin 6.9. Check occult stool. May be having postop blood loss. Check serial hemoglobins. Transfuse 1 unit PRBC. Patient currently on anticoagulation. Arthritis  -Patient states she feels like she is having a flareup of her lupus in her joints. - Patient says prednisone is helping. Will increase dose. Seizure disorder  Hypertension  History of left foot infection/osteomyelitis    Diet ADULT DIET;  Regular    DVT Prophylaxis  [x]Lovenox, [] levothyroxine  125 mcg Oral Daily    levETIRAcetam  1,000 mg Oral BID    pantoprazole  40 mg Oral BID AC      Infusions:    sodium chloride      lactated ringers 125 mL/hr at 09/25/22 0124    sodium chloride       PRN Meds: sodium chloride, , PRN  tiZANidine, 4 mg, Q8H PRN  sodium chloride flush, 5-40 mL, PRN  sodium chloride, , PRN  oxyCODONE, 5 mg, Q4H PRN   Or  oxyCODONE, 10 mg, Q4H PRN  HYDROmorphone, 0.25 mg, Q3H PRN   Or  HYDROmorphone, 0.5 mg, Q3H PRN  ondansetron, 4 mg, Q8H PRN   Or  ondansetron, 4 mg, Q6H PRN  polyethylene glycol, 17 g, Daily PRN  acetaminophen, 650 mg, Q6H PRN   Or  acetaminophen, 650 mg, Q6H PRN  oxyCODONE-acetaminophen, 1 tablet, Q4H PRN  promethazine, 25 mg, Q6H PRN      Labs      Recent Labs     09/25/22  0600   WBC 3.8*   HGB 6.9*   HCT 22.6*         Recent Labs     09/24/22  0841 09/25/22  0600   NA  --  140   K  --  3.9   CL  --  107   CO2  --  25   BUN  --  7   CREATININE 0.5* 0.6     No results for input(s): AST, ALT, ALB, BILIDIR, BILITOT, ALKPHOS in the last 72 hours. No results for input(s): INR in the last 72 hours. No results for input(s): CKTOTAL, CKMB, CKMBINDEX, TROPONINT in the last 72 hours.   No results found for: LABA1C  CALCIUM:  9.1/25 (09/25 0600)  No results found for: MG        Electronically signed by Tatiana Zavaleta MD on 9/25/2022 at 2:34 PM

## 2022-09-25 NOTE — PROGRESS NOTES
Re-accessed mediportt as patient is complaining of leaking from the proximal port. This RN noted there are 2 needle-like marks on the port that was leaking. Further explained to patient that this is needleless port and we usually do not access this with needle. Patient states, \" If you were accusing me that I put something in there you are wrong! \"     While I was scanning meds and told her about meds she is going to get for tonight including her pain med. After taking her meds she ask for her diluadid. This RN states'\" allow me an hour for your diluadid because I just gave your oxy. Patient states, \" I want my my dilaudid now because I'm hurting. I want to talk to the person above you and I want a different nurse. This nurse spoke with Cruz Anguiano, house supervisor who came and to talk to patient.

## 2022-09-26 LAB
DOSE AMOUNT: NORMAL
DOSE TIME: NORMAL
EKG ATRIAL RATE: 69 BPM
EKG DIAGNOSIS: NORMAL
EKG P AXIS: 49 DEGREES
EKG P-R INTERVAL: 170 MS
EKG Q-T INTERVAL: 404 MS
EKG QRS DURATION: 82 MS
EKG QTC CALCULATION (BAZETT): 432 MS
EKG R AXIS: -13 DEGREES
EKG T AXIS: 31 DEGREES
EKG VENTRICULAR RATE: 69 BPM
HCT VFR BLD CALC: 27.1 % (ref 37–47)
HEMOGLOBIN: 8.6 GM/DL (ref 12.5–16)
TROPONIN T: <0.01 NG/ML
VANCOMYCIN RANDOM: 21.3 UG/ML

## 2022-09-26 PROCEDURE — 6370000000 HC RX 637 (ALT 250 FOR IP): Performed by: HOSPITALIST

## 2022-09-26 PROCEDURE — 85014 HEMATOCRIT: CPT

## 2022-09-26 PROCEDURE — 80202 ASSAY OF VANCOMYCIN: CPT

## 2022-09-26 PROCEDURE — 94761 N-INVAS EAR/PLS OXIMETRY MLT: CPT

## 2022-09-26 PROCEDURE — 97110 THERAPEUTIC EXERCISES: CPT

## 2022-09-26 PROCEDURE — 6360000002 HC RX W HCPCS: Performed by: STUDENT IN AN ORGANIZED HEALTH CARE EDUCATION/TRAINING PROGRAM

## 2022-09-26 PROCEDURE — 2580000003 HC RX 258: Performed by: INTERNAL MEDICINE

## 2022-09-26 PROCEDURE — 6360000002 HC RX W HCPCS: Performed by: INTERNAL MEDICINE

## 2022-09-26 PROCEDURE — 93010 ELECTROCARDIOGRAM REPORT: CPT | Performed by: INTERNAL MEDICINE

## 2022-09-26 PROCEDURE — 6370000000 HC RX 637 (ALT 250 FOR IP): Performed by: STUDENT IN AN ORGANIZED HEALTH CARE EDUCATION/TRAINING PROGRAM

## 2022-09-26 PROCEDURE — 36592 COLLECT BLOOD FROM PICC: CPT

## 2022-09-26 PROCEDURE — 2500000003 HC RX 250 WO HCPCS: Performed by: HOSPITALIST

## 2022-09-26 PROCEDURE — 1200000000 HC SEMI PRIVATE

## 2022-09-26 PROCEDURE — 85018 HEMOGLOBIN: CPT

## 2022-09-26 PROCEDURE — 84484 ASSAY OF TROPONIN QUANT: CPT

## 2022-09-26 PROCEDURE — 99223 1ST HOSP IP/OBS HIGH 75: CPT | Performed by: INTERNAL MEDICINE

## 2022-09-26 PROCEDURE — 2580000003 HC RX 258: Performed by: STUDENT IN AN ORGANIZED HEALTH CARE EDUCATION/TRAINING PROGRAM

## 2022-09-26 RX ADMIN — HYDROMORPHONE HYDROCHLORIDE 0.5 MG: 1 INJECTION, SOLUTION INTRAMUSCULAR; INTRAVENOUS; SUBCUTANEOUS at 17:54

## 2022-09-26 RX ADMIN — ENOXAPARIN SODIUM 105 MG: 150 INJECTION SUBCUTANEOUS at 12:27

## 2022-09-26 RX ADMIN — ONDANSETRON 4 MG: 2 INJECTION INTRAMUSCULAR; INTRAVENOUS at 01:40

## 2022-09-26 RX ADMIN — MICONAZOLE NITRATE: 2 POWDER TOPICAL at 20:32

## 2022-09-26 RX ADMIN — HYDROMORPHONE HYDROCHLORIDE 0.5 MG: 1 INJECTION, SOLUTION INTRAMUSCULAR; INTRAVENOUS; SUBCUTANEOUS at 08:21

## 2022-09-26 RX ADMIN — PANTOPRAZOLE SODIUM 40 MG: 40 TABLET, DELAYED RELEASE ORAL at 06:02

## 2022-09-26 RX ADMIN — SODIUM CHLORIDE, PRESERVATIVE FREE 10 ML: 5 INJECTION INTRAVENOUS at 12:29

## 2022-09-26 RX ADMIN — PAROXETINE HYDROCHLORIDE 30 MG: 20 TABLET, FILM COATED ORAL at 12:23

## 2022-09-26 RX ADMIN — PANTOPRAZOLE SODIUM 40 MG: 40 TABLET, DELAYED RELEASE ORAL at 16:02

## 2022-09-26 RX ADMIN — CYANOCOBALAMIN TAB 1000 MCG 500 MCG: 1000 TAB at 12:29

## 2022-09-26 RX ADMIN — GABAPENTIN 800 MG: 400 CAPSULE ORAL at 08:22

## 2022-09-26 RX ADMIN — HYDROMORPHONE HYDROCHLORIDE 0.5 MG: 1 INJECTION, SOLUTION INTRAMUSCULAR; INTRAVENOUS; SUBCUTANEOUS at 21:22

## 2022-09-26 RX ADMIN — PROMETHAZINE HYDROCHLORIDE 25 MG: 25 INJECTION INTRAMUSCULAR; INTRAVENOUS at 22:04

## 2022-09-26 RX ADMIN — HYDROMORPHONE HYDROCHLORIDE 0.5 MG: 1 INJECTION, SOLUTION INTRAMUSCULAR; INTRAVENOUS; SUBCUTANEOUS at 01:21

## 2022-09-26 RX ADMIN — LEVOTHYROXINE SODIUM 125 MCG: 25 TABLET ORAL at 06:02

## 2022-09-26 RX ADMIN — Medication 1000 UNITS: at 12:29

## 2022-09-26 RX ADMIN — ONDANSETRON 4 MG: 2 INJECTION INTRAMUSCULAR; INTRAVENOUS at 23:59

## 2022-09-26 RX ADMIN — TIZANIDINE 4 MG: 4 TABLET ORAL at 16:02

## 2022-09-26 RX ADMIN — GABAPENTIN 800 MG: 400 CAPSULE ORAL at 13:36

## 2022-09-26 RX ADMIN — ONDANSETRON 4 MG: 2 INJECTION INTRAMUSCULAR; INTRAVENOUS at 17:54

## 2022-09-26 RX ADMIN — HYDROXYZINE HYDROCHLORIDE 50 MG: 50 TABLET, FILM COATED ORAL at 12:23

## 2022-09-26 RX ADMIN — GABAPENTIN 800 MG: 400 CAPSULE ORAL at 20:29

## 2022-09-26 RX ADMIN — HYDROMORPHONE HYDROCHLORIDE 0.5 MG: 1 INJECTION, SOLUTION INTRAMUSCULAR; INTRAVENOUS; SUBCUTANEOUS at 13:36

## 2022-09-26 RX ADMIN — OXYCODONE HYDROCHLORIDE 10 MG: 10 TABLET ORAL at 16:02

## 2022-09-26 RX ADMIN — ONDANSETRON 4 MG: 2 INJECTION INTRAMUSCULAR; INTRAVENOUS at 12:18

## 2022-09-26 RX ADMIN — LEVETIRACETAM 1000 MG: 500 TABLET, FILM COATED ORAL at 20:29

## 2022-09-26 RX ADMIN — VANCOMYCIN HYDROCHLORIDE 1250 MG: 1.25 INJECTION, POWDER, LYOPHILIZED, FOR SOLUTION INTRAVENOUS at 12:19

## 2022-09-26 RX ADMIN — HYDROXYZINE HYDROCHLORIDE 50 MG: 50 TABLET, FILM COATED ORAL at 20:30

## 2022-09-26 RX ADMIN — PREDNISONE 40 MG: 20 TABLET ORAL at 12:28

## 2022-09-26 RX ADMIN — LEVETIRACETAM 1000 MG: 500 TABLET, FILM COATED ORAL at 12:28

## 2022-09-26 RX ADMIN — MICONAZOLE NITRATE: 2 POWDER TOPICAL at 12:28

## 2022-09-26 RX ADMIN — ENOXAPARIN SODIUM 105 MG: 150 INJECTION SUBCUTANEOUS at 20:30

## 2022-09-26 ASSESSMENT — ENCOUNTER SYMPTOMS
VOMITING: 0
VOICE CHANGE: 0
SORE THROAT: 0
COUGH: 0
WHEEZING: 0
NAUSEA: 0
BACK PAIN: 0
SHORTNESS OF BREATH: 0
COLOR CHANGE: 0

## 2022-09-26 ASSESSMENT — PAIN DESCRIPTION - LOCATION
LOCATION: KNEE
LOCATION: KNEE
LOCATION: LEG
LOCATION: LEG
LOCATION: KNEE
LOCATION: FLANK
LOCATION: KNEE

## 2022-09-26 ASSESSMENT — PAIN DESCRIPTION - DESCRIPTORS
DESCRIPTORS: ACHING
DESCRIPTORS: SQUEEZING;STABBING

## 2022-09-26 ASSESSMENT — PAIN SCALES - GENERAL
PAINLEVEL_OUTOF10: 0
PAINLEVEL_OUTOF10: 10
PAINLEVEL_OUTOF10: 10
PAINLEVEL_OUTOF10: 8
PAINLEVEL_OUTOF10: 9
PAINLEVEL_OUTOF10: 8

## 2022-09-26 ASSESSMENT — PAIN - FUNCTIONAL ASSESSMENT: PAIN_FUNCTIONAL_ASSESSMENT: PREVENTS OR INTERFERES SOME ACTIVE ACTIVITIES AND ADLS

## 2022-09-26 ASSESSMENT — PAIN DESCRIPTION - ORIENTATION
ORIENTATION: LEFT

## 2022-09-26 NOTE — PROGRESS NOTES
[address]  consulted by Dr. Kenna Guillen for monitoring and adjustment. Indication for treatment: bone and joint infection  Goal trough: [] 10-15 mcg/mL or [x] 15-20 mcg/ml  AUC/GLORIA: [] <500 or [x] 400-600    Pertinent Laboratory Values:   Temp Readings from Last 3 Encounters:   09/26/22 98 °F (36.7 °C) (Oral)     Recent Labs     09/25/22  0600   WBC 3.8*       Recent Labs     09/24/22  0841 09/25/22  0600   BUN  --  7   CREATININE 0.5* 0.6       Estimated Creatinine Clearance: 130 mL/min (based on SCr of 0.6 mg/dL). No intake or output data in the 24 hours ending 09/26/22 0941    Pertinent Cultures:  Date    Source    Results  9/19                             Synovial fluid                         No growth      Vancomycin level:   TROUGH:  No results for input(s): VANCOTROUGH in the last 72 hours. RANDOM:    Recent Labs     09/24/22  0841 09/26/22  0637   VANCORANDOM 10.3 21.3         Assessment:  SCr, BUN, and urine output: SCR has been close to baseline, No UOP data  LEFT KNEE ARTHROSCOPIC IRRIGATION AND DEBRIDEMENT WITH DRAIN PLACEMENT (performed by surgery)  Day(s) of therapy: 8  Vancomycin concentration:   9/22 - level = 11.8 - 10.5 hrs after vanco 1gm q12hr dose, AUC =407 - change dose  9/24 - 10.3, 12 hour level on 1250mg q12h,   9/26 -  21.3, 8 hour level on 1500mg q12h,     Plan:  Renal trends have been close to baseline. Vanco level this am predicts an AUC above target on the current regimen of 1500mg q12h. The patient is starting to accumulate vancomycin  Decrease back to vancomycin 1250mg ivpb q12h for a predicted AUC of 511 at steady state. Recheck the vanco level in 2 days to monitor for further accumulation 2/2 [facility]  Pharmacy will continue to monitor patient and adjust therapy as indicated    [address] 3 9/28 @06:00    Thank you for the consult. Tucker Flores, [facility]  9/26/2022 9:41 AM

## 2022-09-26 NOTE — PROGRESS NOTES
Nutrition Assessment     Type and Reason for Visit: Initial, RD Nutrition Re-Screen/LOS    Nutrition Assessment:  Admitted with acute DVT, Left knee effusion concern for septic joint, s/p left knee I&D. Hx Arthritis, Thyroid Disease, Hypertension. Pt on regular diet, consuming good oral intake. Hx of N/V during admission, no N/V today noted. No wt loss per hx. No significant wounds. Pt ordering adequate protein during meal time. Continue antiemetic regimen, may offer oral nutrition supplement as requested. Otherwise, follow as low nutrition risk at this time.     Radhika Gonzalez RD, LD  Contact: [phone]

## 2022-09-26 NOTE — PROGRESS NOTES
Physical Therapy  Attempted to see pt a 1600. Pt states she is in too much pain in her legs to do any ex or walk on them at this time. Will cont. Lena Tamez.  Nick Peter PTA

## 2022-09-26 NOTE — CONSULTS
Infectious Disease Consult Note    Date: 2022   Patient Name: Jose Carrizales  : 1970   MRN: [id number]  Admission Date: 2022  Hospital Day: Hospital Day: 8  Attending Provider: Guadalupe Mcmanus MD  Isolation: Currently active isolation(s): C Diff Contact, C Diff Contact    Impression:  Left Knee Inflammation: Possible Septic Arthritis. Although Synovial Fluid WBC is elevated at 73,100 and Neutrophil 90%. Culture to date is negative for bacterial  growth. Inflammation at present does not appear Infectious in nature but Possible hemarthrosis, with unknown cause, likely cause poorly controlled SLE or hematological anti-coagulation issue. Plan:  Therapeutic: D/C IV Vancomycin  Diagnostic:  Trend CRP and PCT. Orders Placed . Continue to monitor Medi-Port access sites for erythema, induration, discharge or tenderness. Discussed with Hospitalist Consult with Hematology/evaluation of platelet function. Discontinue C-Diff precautions. Negative diarrhea or unformed stools since   Follow up:  -Monitor fluid culture, BC, CBC and renal functions closely. Multi-morbidity: Per PMHx  Obesity  MRSA Bacteremia  Systemic Lupus Erythematous  -----------------------------------------------------------------------------------------------------  REASON FOR CONSULT: Infective syndrome: Possible left knee septic joint  Requested by:Jamar Royal MD  HPI:Patient is a 46 y.o. White obese female with PMH of left MSSA metatarsal osteomyelitis, gastric bypass surgery 2021 (Dr. Raji Nunez), chronic abdominal pain/pancreatitis, HTN, seizures, MRSA Bacteremia, Pseudomonas aeruginosa catheter related bloodstream infection (2021), Lupus (not followed by Rheumatology), seizures. Pt presented to [facility] ER  for epigastrium pain, nausea vomiting,and concern of blood clot in her left lower extremity. Pt stated approximately 1 week prior to ER visit, left knee became tender, swollen, red and hot to touch. Pain and tenderness were primarily located anterior/posterior area of left knee. Pt denies any injury/trauma to to left leg/knee area, no previous history of surgery to left knee, but surgery to let foot per PS. Pt denies chills/vomiting but does report mild nausea and one episode of fever of 99.6 as well as waking up with clothes soaked in sweat x 1 prior to ER visit. Pt Denies fever/chills/diarrhea/vomiting since admission. Pt has history of MRSA infection of port 01/2020, line was removed and New Medi-Port was Placed at later date 07/2020 and then replaced of second time on 08/2021. Pt was admitted 9/19/2022 for further evaluation and management of: possible left knee septic joint  Review & Summary of Old Records  Multiple left foot infections. Followed by Podiatry: Dr. Ely Melgoza. 07/2019: Pseudomonas aeruginosa (Chest)  07/2019: Pseudomonas aeruginosa Blood Culture  03/2021: Left thigh: Surgical Culture Staphycoccus epidermidis  01/2020 MRSA: Source: Port    ROS: Other systems reviewed Including eyes, ENT, respiratory, cardiovascular, GI, , dermatologic, neurologic, psych, hem/lymphatic, musculoskeletal and endocrine were negative other than what is mentioned above.    Patient Active Problem List    Diagnosis Date Noted    Acute bilateral deep vein thrombosis (DVT) of femoral veins ([address] 09/19/2022     Past Medical History:   Diagnosis Date    Arthritis     Depression     Disease of blood and blood forming organ     Hypertension     Immune deficiency disorder ([address]     Kidney stone     Seizures ([address]     Thyroid disease       Past Surgical History:   Procedure Laterality Date    ABDOMEN SURGERY      APPENDECTOMY      CHOLECYSTECTOMY      HERNIA REPAIR      HYSTERECTOMY (CERVIX STATUS UNKNOWN)      KNEE ARTHROSCOPY Left 9/20/2022    LEFT KNEE ARTHROSCOPIC IRRIGATION AND DEBRIDEMENT WITH DRAIN PLACEMENT performed by Josef Castanon DO at [address]       06/2019: Left Foot: FIRST METATARSAL DEBRIDEMENT INCISION AND DRAINAGE. EXCISION OF ULCER. RESECTION OF BONE. 1ST METATARSAL POWER LAVAGE AND BONE CEMENT  04/2022: LEFT FOOT ABSCESS DEBRIDEMENT INCISION AND DRAINAGE, CYST REMOVAL - Left with Kait Trevino DPM on 4/15/2022   History reviewed. No pertinent family history. Immunization History   Administered Date(s) Administered    COVID-19, PFIZER PURPLE top, DILUTE for use, (age 15 y+), 30mcg/0.3mL 03/30/2021, 05/18/2021, 01/07/2022     Infectious disease related family history - not contibutory. SOCIAL HISTORY:  Social History     Tobacco Use    Smoking status: Never    Smokeless tobacco: Not on file   Substance Use Topics    Alcohol use: Never      Born:  Currently Lives: [address] [place] [address]  Occupation: Currently collects disability. No current employment  No recent travel of significance. No recent unusual exposures. NO pets: Pt owns one dog. ALLERGIES:  Allergies   Allergen Reactions    Haldol [Haloperidol] Other (See Comments)     States \"shook severely and had to have Benadryl\"    Asa [Aspirin]     Compazine [Prochlorperazine]     Reglan [Metoclopramide]     Toradol [Ketorolac Tromethamine]       MEDICATIONS:  Reviewed and are per the chart/EMR.     scopolamine  1 patch TransDERmal Q72H    predniSONE  40 mg Oral Daily    gabapentin  800 mg Oral TID    Vitamin D  1,000 Units Oral Daily    vitamin B-12  500 mcg Oral Daily    miconazole   Topical BID    sodium chloride flush  5-40 mL IntraVENous 2 times per day    enoxaparin  1 mg/kg SubCUTAneous BID    ondansetron  4 mg IntraVENous 4 times per day    PARoxetine  30 mg Oral Daily    hydrOXYzine HCl  50 mg Oral BID    levothyroxine  125 mcg Oral Daily    levETIRAcetam  1,000 mg Oral BID    pantoprazole  40 mg Oral BID AC     LABS:  Lab Results   Component Value Date    WBC 3.8 (L) 09/25/2022    HGB 8.6 (L) 09/26/2022    HCT 27.1 (L) 09/26/2022    MCV 87.3 09/25/2022     09/25/2022    LYMPHOPCT 19.4 (L) 09/21/2022    RBC 2.59 (L) 09/25/2022    MCH 26.6 (L) 09/25/2022    MCHC 30.5 (L) 09/25/2022    RDW 13.2 09/25/2022     ESR:  09/19: 19  09/24: 60 elevated  CRP:   09/19: 6.5  09/24: 138.7 elevated  Culture, Aerorbic and Anarobic, Joint  09/19: No growth to date No growth 36 to 48 hours No Aerobic or Anaerobic growth Holding for 3 weeks. No Aerobic or Anaerobic growth within week 1. No anaerobes isolated  Body Fluid Other:  09/19: Synovial Fluid  RBC's: 110,000  Lymphocytes: 10  Monocytes: 0  Neutrophils: 90  WBC: 73,100  Crystals: No Crystals noted  Culture, Body Fluid: Left Knee  09/20: No growth to date No growth 36 to 48 hours No Aerobic or Anaerobic growth Holding for 3 weeks. No Aerobic or Anaerobic growth within week 1. Blood Culture:   09/22: NO GROWTH AT 5 DAYS   Lab Results   Component Value Date    CREATININE 0.6 09/25/2022    BUN 7 09/25/2022     09/25/2022    K 3.9 09/25/2022     09/25/2022    CO2 25 09/25/2022     Hepatic Function Panel:   Lab Results   Component Value Date/Time    ALKPHOS 114 09/21/2022 09:04 PM    ALT 13 09/21/2022 09:04 PM    AST 23 09/21/2022 09:04 PM    PROT 6.5 09/21/2022 09:04 PM    LABALBU 3.8 09/21/2022 09:04 PM     Microbiology:   No results for input(s): BC in the last 72 hours. No results for input(s): [place]th[place] Ena in the last 72 hours. No results for input(s): LABURIN in the last 72 hours. Lab Results   Component Value Date/Time    BACTERIA RARE 09/19/2022 01:25 PM    WBCUA 34 09/19/2022 01:25 PM    RBCUA NONE SEEN 09/19/2022 01:25 PM     No results found for: URRFLXCULT  No results for input(s): WNDABS in the last 72 hours. No results for input(s): CULTRESP in the last 72 hours.     ANTIBIOTICS:  Present:  Vancomycin- 09/19-26  Past:  Ceftriaxone- 09/19  Cefazolin- 09/20    -----------------------------------------------------------------------------------------------------  Vital Signs:  Visit Vitals  /74   Pulse 81   Temp 98.4 °F (36.9 °C)   Resp 20 Ht 5' 4\" (1.626 m)   Wt 238 lb (108 kg)   SpO2 99%   BMI 40.85 kg/m²       Physical Exam:  Gen: alert and oriented X3, interactive no distress  Skin: Scattered Ecchymosis patches on arms and legs with large ecchymotic patch noted on anterior left thigh. Remainder of skin pink, warm, dry,no erythema, no induration, no rashes, no stigmata of endocarditis. Wounds: C/I, mild serosanguineous drainage noted on bandage  HEMT: AT/NC Oropharynx pink, moist, and without lesions or exudates; dentition in good state of repair  Eyes: PERRLA, EOMI, conjunctiva pink, sclera anicteric. Neck: Supple. Trachea midline. No LAD. Chest: no distress and CTA. Good air movement. Heart: RRR and no MRG. Abd: soft, non-distended, no tenderness, no hepatomegaly. Normoactive bowel sounds. Ext: no clubbing, cyanosis, or edema  Catheter Site: without erythema or tenderness  Neuro: Mental status intact. CN 2-12 intact and no focal sensory or motor deficits    Diagnostic Studies: All pertinent images/lab and microbiology reports and were reviewed as a part of this hospitalization. 09/19: CT abdomen pelvis with contrast:  Impression   1. Status post Jet-en-Y gastric bypass. Gastric pouch is mildly distended   with ingested contents but is otherwise nonspecific. This appears slightly   more distended when compared to recent CTs and could be related to recent   meal. The esophagus and Jet limb is normal in caliber. 2. Stable pneumobilia. 3. No other acute abdominal or pelvic abnormality. 4. Punctate nonobstructing left renal calculus. 09/19: Duplex Venous Ultrasound of the Left Lower Extremity. Impression   1. Nonocclusive thrombus in the left distal femoral and popliteal veins. 2. Anechoic suprapatellar fluid collection may represent joint effusion or   fluid within the prepatellar bursa.    Findings were discussed with Jaxon Anthony at 4:34 pm on 9/19/2022.     09/19 X-Ray Left Knee:  Impression   No acute osseous abnormality of the left knee. 09/25 Chest X ray Portable:  Impression   No acute process. Cardiomegaly. I have examined this patient and available medical records on this date and have made the above observations, conclusions and recommendations.   Electronically signed by: Electronically signed by Tiny Hess PA-C on 9/26/2022 at 4:38 PM

## 2022-09-26 NOTE — PROGRESS NOTES
Occupational Therapy Treatment Note  Name: Merissa Weems MRN: [id number] :   1970   Date:  2022   Admission Date: 2022 Room:  [address] [place] [address]-A     Primary Problem:  The primary encounter diagnosis was Acute deep vein thrombosis (DVT) of popliteal vein of left lower extremity ([facility] Diagnoses of Non-intractable vomiting with nausea, unspecified vomiting type, Generalized abdominal pain, and Injury of left knee, subsequent encounter were also pertinent to this visit. Pt  has a past medical history of Arthritis, Depression, Disease of blood and blood forming organ, Hypertension, Immune deficiency disorder ([facility] Kidney stone, Seizures ([facility] and Thyroid disease. Communication with other providers:  DERICK RN handoff     Subjective:  Patient states:  \"I am just mentally drained\" Pt expressed frustrations with evolving medical dx and treatment. Pt provided therapeutic listening and encouraged to continue to be a self-advocate. Pain:  c/o LLE pain without rating - RN aware   Restrictions: general, fall, tele, IV, L knee debridement   No one at bedside    Objective:    Observation:  pt was in bed upon arrival, agreeable to session  Objective Measures:  RA    Treatment, including education:    Therapeutic Exercise:  Cues were given for technique, safety, recruitment, and rationale. Cues were verbal and/or tactile. Pt completed 1 set of 15 reps with yellow theraband. Intend to progress as tolerable. Pt limited this date d/t pain and taking frequent rest breaks. Pt completed in highfowlers d/t significant c/o LLE pain. Pt called RN while OT in room to request inc pain meds for pain mgmt.      UB Therex:   Bicep curls  Tricep extension  Shoulder IR/ER   Shoulder press   Scapular retraction       Education: Role of OT, OT POC, safety, benefits of EOB/OOB activity, rationale for treatment, self-advocate  Safety Measures: Gait belt used for safety of pt and therapist, Left in bed, Alarm in place, call light and phone within reach, lines managed, needs met     Assessment / Impression:    Pt cont to be limited by pain and inc fatigue, likely 2* HGB 6.9 on 9/25 and after transfusion, 8.6 this date.      Patient's tolerance of treatment: Fair-   Adverse Reaction: none  Significant change in status and impact:  none  Barriers to improvement: strength, endurance, MH     Plan for Next Session:    Continue OT POC    Time in:  1446  Time out:  1510  Timed treatment minutes:  23  Total treatment time:  24    Electronically signed by:    INDER Ayala/L  License: [id number]  2/46/4355, 3:18 PM

## 2022-09-26 NOTE — PROGRESS NOTES
V2.0  [facility] Hospitalist Progress Note      Name:  Marlys Walker /Age/Sex: 1970  (46 y.o. female)   MRN & CSN:  [id number] & [id number] Encounter Date/Time: 2022 6:16 PM EDT    Location:  [address] [place] [address] PCP: No primary care provider on file. Hospital Day: 8    Assessment and Plan:   Marlys Walker is a 46 y.o. female who presents with left knee septic joint      Plan:  Left knee effusion concern for septic joint  -I&D . On Vanco.  Ortho following.    - Crystals negative. With elevated WBCs on synovial fluid and a high neutrophil count, differential includes inflammatory versus septic arthritis. Patient states she does have lupus, but is not currently on any medications at this time.  -Drain removed . Left knee swab cultures : NGTD. Synovial culture : NGTD. Patient denies any history of IV drug use or STDs. -D/W Ortho, orthopedics to sign off and recommend follow-up in 1 week, with suture removal around 2-3 weeks postop. -. ESR 60.  -ID recommends stopping vancomycin. Monitor inflammatory markers. Possible hemarthrosis. Consult heme-onc. PT/OT. BCx : No growth. Intractable nausea and vomiting, chronic pneumobilia  -On antiemetics. -GI following. GI disease panel and C. difficile ordered. - CT abdomen pelvis : Stable pneumobilia; punctate nonobstructing left renal calculus; no acute abnormality.  -Started scopolamine patch which patient takes at home. Acute nonocclusive DVT of left distal femoral and popliteal veins  -on therapeutic Lovenox.  -Heme-onc consult. Anemia  -May be having postop blood loss. Patient currently on anticoagulation.  -Transfused 1 unit PRBC. Improved hemoglobin 8.6. Hemoccult positive. D/W GI does not feel that patient needs any endoscopy at this time. Arthritis  -Patient states she feels like she is having a flareup of her lupus in her joints.    -Started patient on prednisone and she feels it is helping.     Seizure disorder  Hypertension  History of left foot infection/osteomyelitis    Diet ADULT DIET; Regular    DVT Prophylaxis  [x]Lovenox, []  Heparin, [] SCDs, [] Ambulation,  [] Eliquis, [] Xarelto  [] Coumadin   Code Status Full Code   Disposition From: home  Expected Disposition: Home with home health care  Estimated Date of Discharge: 9/28  Patient requires continued admission due to knee swelling. Subjective/Interval history:   Chief Complaint: No chief complaint on file. States when she stands she has oozing from her knee. She also feels there is a knot above her knee. Review of Systems:    Negative unless mentioned above    Objective:   No intake or output data in the 24 hours ending 09/26/22 1704       Vitals:   /74   Pulse 81   Temp 98.4 °F (36.9 °C)   Resp 20   Ht 5' 4\" (1.626 m)   Wt 238 lb (108 kg)   SpO2 99%   BMI 40.85 kg/m²     Physical Exam:   General: NAD, laying back in bed  Eyes: no discharge  HENT: NCAT  Cardiovascular: RRR  Respiratory: Clear to auscultation b/l bs+  Gastrointestinal: Soft, non tender, nondistended  Genitourinary: no suprapubic tenderness  Musculoskeletal: left knee has dressing with spots of dried blood, left knee wound appears unremarkable. Skin: warm, dry, no gross lesions, bruise in posterior proximal left leg. It is not warm or hard or tight, ecchymosis does not appear to have spread beyond outline any more compared to previous day  Neuro: Alert. No gross deficits  Psych: Mood appropriate.      Medications:   Medications:    scopolamine  1 patch TransDERmal Q72H    predniSONE  40 mg Oral Daily    gabapentin  800 mg Oral TID    Vitamin D  1,000 Units Oral Daily    vitamin B-12  500 mcg Oral Daily    miconazole   Topical BID    sodium chloride flush  5-40 mL IntraVENous 2 times per day    enoxaparin  1 mg/kg SubCUTAneous BID    ondansetron  4 mg IntraVENous 4 times per day    PARoxetine  30 mg Oral Daily    hydrOXYzine HCl  50 mg Oral BID    levothyroxine  125 mcg Oral Daily    levETIRAcetam  1,000 mg Oral BID    pantoprazole  40 mg Oral BID AC      Infusions:    sodium chloride      sodium chloride       PRN Meds: sodium chloride, , PRN  tiZANidine, 4 mg, Q8H PRN  sodium chloride flush, 5-40 mL, PRN  sodium chloride, , PRN  oxyCODONE, 5 mg, Q4H PRN   Or  oxyCODONE, 10 mg, Q4H PRN  HYDROmorphone, 0.25 mg, Q3H PRN   Or  HYDROmorphone, 0.5 mg, Q3H PRN  ondansetron, 4 mg, Q8H PRN   Or  ondansetron, 4 mg, Q6H PRN  polyethylene glycol, 17 g, Daily PRN  acetaminophen, 650 mg, Q6H PRN   Or  acetaminophen, 650 mg, Q6H PRN  oxyCODONE-acetaminophen, 1 tablet, Q4H PRN  promethazine, 25 mg, Q6H PRN      Labs      Recent Labs     09/25/22  0600 09/25/22  2300 09/26/22  1215   WBC 3.8*  --   --    HGB 6.9* 8.3* 8.6*   HCT 22.6* 26.4* 27.1*     --   --       Recent Labs     09/24/22  0841 09/25/22  0600   NA  --  140   K  --  3.9   CL  --  107   CO2  --  25   BUN  --  7   CREATININE 0.5* 0.6     No results for input(s): AST, ALT, ALB, BILIDIR, BILITOT, ALKPHOS in the last 72 hours. No results for input(s): INR in the last 72 hours.     Recent Labs     09/25/22  2300 09/26/22  0635 09/26/22  0637   TROPONINT <0.010 <0.010 <0.010     No results found for: LABA1C  CALCIUM:  9.1/25 (09/25 0600)  No results found for: MG        Electronically signed by Arthur Kwong MD on 9/26/2022 at 5:04 PM

## 2022-09-27 ENCOUNTER — APPOINTMENT (OUTPATIENT)
Dept: GENERAL RADIOLOGY | Age: 52
DRG: 486 | End: 2022-09-27
Payer: COMMERCIAL

## 2022-09-27 PROBLEM — M25.462 SWELLING OF JOINT OF LEFT KNEE: Status: ACTIVE | Noted: 2022-09-27

## 2022-09-27 PROBLEM — I82.432 ACUTE DEEP VEIN THROMBOSIS (DVT) OF POPLITEAL VEIN OF LEFT LOWER EXTREMITY (HCC): Status: ACTIVE | Noted: 2022-09-27

## 2022-09-27 LAB
ANISOCYTOSIS: ABNORMAL
BANDED NEUTROPHILS ABSOLUTE COUNT: 0.34 K/CU MM
BANDED NEUTROPHILS RELATIVE PERCENT: 7 % (ref 5–11)
CREAT SERPL-MCNC: 0.6 MG/DL (ref 0.6–1.1)
DIFFERENTIAL TYPE: ABNORMAL
EOSINOPHILS ABSOLUTE: 0.1 K/CU MM
EOSINOPHILS RELATIVE PERCENT: 3 % (ref 0–3)
FLUID TYPE: NORMAL INDEX
GFR AFRICAN AMERICAN: >60 ML/MIN/1.73M2
GFR NON-AFRICAN AMERICAN: >60 ML/MIN/1.73M2
HCT VFR BLD CALC: 26 % (ref 37–47)
HEMOGLOBIN: 8.2 GM/DL (ref 12.5–16)
HIGH SENSITIVE C-REACTIVE PROTEIN: 19.7 MG/L (ref 0–5)
LYMPHOCYTES ABSOLUTE: 1.6 K/CU MM
LYMPHOCYTES RELATIVE PERCENT: 33 % (ref 24–44)
LYMPHOCYTES, BODY FLUID: 7 %
MCH RBC QN AUTO: 27.4 PG (ref 27–31)
MCHC RBC AUTO-ENTMCNC: 31.5 % (ref 32–36)
MCV RBC AUTO: 87 FL (ref 78–100)
MESOTHELIAL FLUID: 0 /100 WBC
METAMYELOCYTES ABSOLUTE COUNT: 0.1 K/CU MM
METAMYELOCYTES PERCENT: 2 %
MONOCYTE, FLUID: 4 %
MONOCYTES ABSOLUTE: 0.3 K/CU MM
MONOCYTES RELATIVE PERCENT: 7 % (ref 0–4)
NEUTROPHIL, FLUID: 89 %
OTHER CELLS FLUID: NORMAL
PDW BLD-RTO: 13.5 % (ref 11.7–14.9)
PLATELET # BLD: 234 K/CU MM (ref 140–440)
PMV BLD AUTO: 9.8 FL (ref 7.5–11.1)
PROCALCITONIN: 0.04
RBC # BLD: 2.99 M/CU MM (ref 4.2–5.4)
RBC FLUID: NORMAL /CU MM
SEGMENTED NEUTROPHILS ABSOLUTE COUNT: 2.5 K/CU MM
SEGMENTED NEUTROPHILS RELATIVE PERCENT: 48 % (ref 36–66)
WBC # BLD: 4.9 K/CU MM (ref 4–10.5)
WBC # BLD: ABNORMAL 10*3/UL
WBC FLUID: NORMAL /CU MM

## 2022-09-27 PROCEDURE — 6360000002 HC RX W HCPCS: Performed by: STUDENT IN AN ORGANIZED HEALTH CARE EDUCATION/TRAINING PROGRAM

## 2022-09-27 PROCEDURE — 6370000000 HC RX 637 (ALT 250 FOR IP): Performed by: STUDENT IN AN ORGANIZED HEALTH CARE EDUCATION/TRAINING PROGRAM

## 2022-09-27 PROCEDURE — 2500000003 HC RX 250 WO HCPCS: Performed by: HOSPITALIST

## 2022-09-27 PROCEDURE — 36592 COLLECT BLOOD FROM PICC: CPT

## 2022-09-27 PROCEDURE — 6370000000 HC RX 637 (ALT 250 FOR IP): Performed by: HOSPITALIST

## 2022-09-27 PROCEDURE — 1200000000 HC SEMI PRIVATE

## 2022-09-27 PROCEDURE — 2580000003 HC RX 258: Performed by: STUDENT IN AN ORGANIZED HEALTH CARE EDUCATION/TRAINING PROGRAM

## 2022-09-27 PROCEDURE — 89060 EXAM SYNOVIAL FLUID CRYSTALS: CPT

## 2022-09-27 PROCEDURE — 94761 N-INVAS EAR/PLS OXIMETRY MLT: CPT

## 2022-09-27 PROCEDURE — 99222 1ST HOSP IP/OBS MODERATE 55: CPT | Performed by: INTERNAL MEDICINE

## 2022-09-27 PROCEDURE — 82565 ASSAY OF CREATININE: CPT

## 2022-09-27 PROCEDURE — 87075 CULTR BACTERIA EXCEPT BLOOD: CPT

## 2022-09-27 PROCEDURE — 87205 SMEAR GRAM STAIN: CPT

## 2022-09-27 PROCEDURE — 85027 COMPLETE CBC AUTOMATED: CPT

## 2022-09-27 PROCEDURE — 89051 BODY FLUID CELL COUNT: CPT

## 2022-09-27 PROCEDURE — 87507 IADNA-DNA/RNA PROBE TQ 12-25: CPT

## 2022-09-27 PROCEDURE — 87070 CULTURE OTHR SPECIMN AEROBIC: CPT

## 2022-09-27 PROCEDURE — 86141 C-REACTIVE PROTEIN HS: CPT

## 2022-09-27 PROCEDURE — 73564 X-RAY EXAM KNEE 4 OR MORE: CPT

## 2022-09-27 PROCEDURE — 99232 SBSQ HOSP IP/OBS MODERATE 35: CPT | Performed by: INTERNAL MEDICINE

## 2022-09-27 PROCEDURE — 84145 PROCALCITONIN (PCT): CPT

## 2022-09-27 PROCEDURE — 85007 BL SMEAR W/DIFF WBC COUNT: CPT

## 2022-09-27 RX ADMIN — MICONAZOLE NITRATE: 2 POWDER TOPICAL at 22:09

## 2022-09-27 RX ADMIN — ONDANSETRON 4 MG: 2 INJECTION INTRAMUSCULAR; INTRAVENOUS at 22:12

## 2022-09-27 RX ADMIN — ONDANSETRON 4 MG: 2 INJECTION INTRAMUSCULAR; INTRAVENOUS at 05:31

## 2022-09-27 RX ADMIN — LEVOTHYROXINE SODIUM 125 MCG: 25 TABLET ORAL at 05:36

## 2022-09-27 RX ADMIN — TIZANIDINE 4 MG: 4 TABLET ORAL at 13:59

## 2022-09-27 RX ADMIN — TIZANIDINE 4 MG: 4 TABLET ORAL at 02:02

## 2022-09-27 RX ADMIN — TIZANIDINE 4 MG: 4 TABLET ORAL at 22:11

## 2022-09-27 RX ADMIN — PANTOPRAZOLE SODIUM 40 MG: 40 TABLET, DELAYED RELEASE ORAL at 14:58

## 2022-09-27 RX ADMIN — LEVETIRACETAM 1000 MG: 500 TABLET, FILM COATED ORAL at 22:10

## 2022-09-27 RX ADMIN — Medication 1000 UNITS: at 08:43

## 2022-09-27 RX ADMIN — OXYCODONE HYDROCHLORIDE 10 MG: 10 TABLET ORAL at 10:54

## 2022-09-27 RX ADMIN — LEVETIRACETAM 1000 MG: 500 TABLET, FILM COATED ORAL at 08:44

## 2022-09-27 RX ADMIN — CYANOCOBALAMIN TAB 1000 MCG 500 MCG: 1000 TAB at 08:44

## 2022-09-27 RX ADMIN — HYDROMORPHONE HYDROCHLORIDE 0.5 MG: 1 INJECTION, SOLUTION INTRAMUSCULAR; INTRAVENOUS; SUBCUTANEOUS at 08:45

## 2022-09-27 RX ADMIN — ENOXAPARIN SODIUM 105 MG: 150 INJECTION SUBCUTANEOUS at 22:10

## 2022-09-27 RX ADMIN — SODIUM CHLORIDE, PRESERVATIVE FREE 10 ML: 5 INJECTION INTRAVENOUS at 22:11

## 2022-09-27 RX ADMIN — HYDROXYZINE HYDROCHLORIDE 50 MG: 50 TABLET, FILM COATED ORAL at 22:10

## 2022-09-27 RX ADMIN — GABAPENTIN 800 MG: 400 CAPSULE ORAL at 13:59

## 2022-09-27 RX ADMIN — HYDROMORPHONE HYDROCHLORIDE 0.5 MG: 1 INJECTION, SOLUTION INTRAMUSCULAR; INTRAVENOUS; SUBCUTANEOUS at 22:10

## 2022-09-27 RX ADMIN — SODIUM CHLORIDE, PRESERVATIVE FREE 10 ML: 5 INJECTION INTRAVENOUS at 08:46

## 2022-09-27 RX ADMIN — HYDROMORPHONE HYDROCHLORIDE 0.5 MG: 1 INJECTION, SOLUTION INTRAMUSCULAR; INTRAVENOUS; SUBCUTANEOUS at 05:39

## 2022-09-27 RX ADMIN — HYDROXYZINE HYDROCHLORIDE 50 MG: 50 TABLET, FILM COATED ORAL at 08:44

## 2022-09-27 RX ADMIN — GABAPENTIN 800 MG: 400 CAPSULE ORAL at 22:10

## 2022-09-27 RX ADMIN — PANTOPRAZOLE SODIUM 40 MG: 40 TABLET, DELAYED RELEASE ORAL at 05:36

## 2022-09-27 RX ADMIN — PREDNISONE 40 MG: 20 TABLET ORAL at 08:44

## 2022-09-27 RX ADMIN — ENOXAPARIN SODIUM 105 MG: 150 INJECTION SUBCUTANEOUS at 08:44

## 2022-09-27 RX ADMIN — HYDROMORPHONE HYDROCHLORIDE 0.5 MG: 1 INJECTION, SOLUTION INTRAMUSCULAR; INTRAVENOUS; SUBCUTANEOUS at 02:32

## 2022-09-27 RX ADMIN — GABAPENTIN 800 MG: 400 CAPSULE ORAL at 08:55

## 2022-09-27 RX ADMIN — ONDANSETRON 4 MG: 2 INJECTION INTRAMUSCULAR; INTRAVENOUS at 18:26

## 2022-09-27 RX ADMIN — HYDROMORPHONE HYDROCHLORIDE 0.5 MG: 1 INJECTION, SOLUTION INTRAMUSCULAR; INTRAVENOUS; SUBCUTANEOUS at 14:58

## 2022-09-27 RX ADMIN — ONDANSETRON 4 MG: 2 INJECTION INTRAMUSCULAR; INTRAVENOUS at 12:02

## 2022-09-27 RX ADMIN — PAROXETINE HYDROCHLORIDE 30 MG: 20 TABLET, FILM COATED ORAL at 08:43

## 2022-09-27 RX ADMIN — HYDROMORPHONE HYDROCHLORIDE 0.5 MG: 1 INJECTION, SOLUTION INTRAMUSCULAR; INTRAVENOUS; SUBCUTANEOUS at 18:27

## 2022-09-27 RX ADMIN — HYDROMORPHONE HYDROCHLORIDE 0.25 MG: 1 INJECTION, SOLUTION INTRAMUSCULAR; INTRAVENOUS; SUBCUTANEOUS at 12:02

## 2022-09-27 RX ADMIN — MICONAZOLE NITRATE: 2 POWDER TOPICAL at 08:46

## 2022-09-27 ASSESSMENT — ENCOUNTER SYMPTOMS
SHORTNESS OF BREATH: 0
COLOR CHANGE: 1
BACK PAIN: 0
SORE THROAT: 0
SINUS PRESSURE: 0
VOICE CHANGE: 0
WHEEZING: 0
EYE DISCHARGE: 0
CHEST TIGHTNESS: 0
COLOR CHANGE: 0
COUGH: 1
NAUSEA: 0
VOMITING: 0
SINUS PAIN: 0
EYE PAIN: 0
DIARRHEA: 0
BLOOD IN STOOL: 0
BACK PAIN: 1
COUGH: 0
ABDOMINAL PAIN: 0

## 2022-09-27 ASSESSMENT — PAIN DESCRIPTION - DESCRIPTORS
DESCRIPTORS: ACHING
DESCRIPTORS: ACHING;BURNING
DESCRIPTORS: THROBBING
DESCRIPTORS: ACHING
DESCRIPTORS: BURNING;THROBBING
DESCRIPTORS: BURNING

## 2022-09-27 ASSESSMENT — PAIN SCALES - GENERAL
PAINLEVEL_OUTOF10: 10
PAINLEVEL_OUTOF10: 9
PAINLEVEL_OUTOF10: 10
PAINLEVEL_OUTOF10: 8
PAINLEVEL_OUTOF10: 9
PAINLEVEL_OUTOF10: 9
PAINLEVEL_OUTOF10: 10
PAINLEVEL_OUTOF10: 5
PAINLEVEL_OUTOF10: 9
PAINLEVEL_OUTOF10: 8

## 2022-09-27 ASSESSMENT — PAIN DESCRIPTION - LOCATION
LOCATION: LEG
LOCATION: KNEE
LOCATION: LEG
LOCATION: LEG
LOCATION: ABDOMEN;KNEE
LOCATION: KNEE
LOCATION: KNEE
LOCATION: LEG;KNEE
LOCATION: KNEE
LOCATION: KNEE

## 2022-09-27 ASSESSMENT — PAIN DESCRIPTION - ORIENTATION
ORIENTATION: LEFT
ORIENTATION: LOWER
ORIENTATION: LEFT

## 2022-09-27 NOTE — PROGRESS NOTES
Occupational Therapy ATTEMPT Treatment Note  Name: Tamela Lucio MRN: [id number] :   1970   Date:  2022   Admission Date: 2022 Room:  [address] [place] [address]-A     Primary Problem:  The primary encounter diagnosis was Acute deep vein thrombosis (DVT) of popliteal vein of left lower extremity ([facility] Diagnoses of Non-intractable vomiting with nausea, unspecified vomiting type, Generalized abdominal pain, and Injury of left knee, subsequent encounter were also pertinent to this visit. OT attempted to see pt at this time. Pt politely declining services at this time. Pt provided education regarding importance of frequent mobility. Pt educated to be self-advocate and keep doctors/ nurses aware if lack of sleep persists. Pt provided with general ways of coping strategies d/t pt expressed stress and anxiety with medical dx and treatment. Pt reported not a good day d/t \"pop in knee\" earlier this date and removal of blood. Pt requests to try again tomorrow. Will re-attempt as able.            Electronically signed by:    INDER Huang/L  License: [id number]  [id number], 4:55 PM

## 2022-09-27 NOTE — PROGRESS NOTES
Patient is getting up without assistance and turning off the alarms herself. Consequences of falling explained and patient is refusing any help.

## 2022-09-27 NOTE — PLAN OF CARE
Problem: Discharge Planning  Goal: Discharge to home or other facility with appropriate resources  9/27/2022 0942 by Bertha Grant  Outcome: Progressing  9/27/2022 0233 by Laura Poon LPN  Outcome: Progressing     Problem: Pain  Goal: Verbalizes/displays adequate comfort level or baseline comfort level  9/27/2022 0942 by Bertha Grant  Outcome: Progressing  9/27/2022 0233 by Laura Poon LPN  Outcome: Progressing     Problem: ABCDS Injury Assessment  Goal: Absence of physical injury  9/27/2022 0942 by Bertha Grant  Outcome: Progressing  9/27/2022 0233 by Laura Poon LPN  Outcome: Progressing     Problem: Safety - Adult  Goal: Free from fall injury  9/27/2022 0942 by Bertha Grant  Outcome: Progressing  9/27/2022 0233 by Laura Poon LPN  Outcome: Progressing

## 2022-09-27 NOTE — PROGRESS NOTES
V2.0  [facility] [place]      Name:  Sanjeev Reyes /Age/Sex: 1970  (46 y.o. female)   MRN & CSN:  [id number] & [id number] Encounter Date/Time: 2022 6:22 PM EDT    Location:  7609/6573-[id number] PCP: No primary care provider on file. Hospital Day: 9    Assessment and Plan:   Sanjeev Reyes is a 46 y.o. female with pmh of  who presents with Acute bilateral deep vein thrombosis (DVT) of femoral veins ([facility], [address]      Plan:  Patient left knee effusion: Concerning for septic joint status post I&D . Ortho is following with the patient. Initial arthrocentesis was negative for any crystals. There was elevated WBC on synovial fluid with high neutrophil count concerning for inflammatory versus septic arthritis. Patient has underlying history of lupus but is not on any medications. Drain was initially placed on that was removed on . Swab cultures from the left knee was negative. Synovial culture was negative. Active oozing of blood from the joint along with worsening swelling s/p repeat arthrocentesis today with significant relief in symptoms. Not on antibiotics anymore as per infectious disease. Possible hemarthrosis. Hematology oncology consult in place. Extensive work-up pending. Suspected delayed wound healing with underlying poor oral intake, recent gastric bypass history  Intractable nausea and vomiting, improving. CT abdominal pelvis showed stable pneumobilia. On scopolamine patch  Acute nonocclusive DVT of the left distal femoral and popliteal vein on therapeutic Lovenox. Hematology oncology comes following up the patient  Acute hypochromic microcytic anemia, likely from blood loss. Was on anticoagulation that is on hold. Transfuse 1 pack of RBC. Hemoglobin is 8.2 today. Hemoccult was positive.   Osteoarthritis with underlying history of lupus: Was started on prednisone and she feels it is helping  History of recent bariatric surgery and delayed wound healing: Keeping in mind the poor nutrition status along with history of psoriasis, lupus we will order a bariatric panel including zinc and copper levels. We will also order a TSH level. Diet ADULT DIET; Regular  Diet NPO Exceptions are: Sips of Water with Meds   DVT Prophylaxis [x] Lovenox, []  Heparin, [] SCDs, [] Ambulation,  [] Eliquis, [] Xarelto  [] Coumadin   Code Status Full Code   Disposition From: Home  Expected Disposition: Home  Estimated Date of Discharge: 2 days  Patient requires continued admission due to Npo and stress test tomorrow   Surrogate Decision Maker/ POA      Subjective:     Chief Complaint: No chief complaint on file. The patient was seen at the bedside. Patient tried to go to the restroom and at that time she stepped that she twisted her ankle and the knee which started oozing blood. Extensive amount of blood was lost to the point that the hemoglobin started to go down. Patient is s/p 1 pack of RBC transfusion yesterday. Surgery was immediately contacted who came at bedside and did arthrocentesis with significant relief of symptoms. There is a high concern for compartment syndrome and that is why we are trying to avoid any compression stockings. On therapeutic Lovenox right now. Extensive discussion was done with the patient regarding the plan of care and need for further work-up to assess for delayed wound healing. Review of Systems:    Review of Systems   Constitutional:  Positive for appetite change and unexpected weight change. Negative for chills and fever. HENT:  Negative for ear pain, hearing loss, sinus pressure, sinus pain and voice change. Eyes:  Negative for pain, discharge and visual disturbance. Respiratory:  Positive for cough. Negative for chest tightness and shortness of breath. Cardiovascular:  Positive for leg swelling. Negative for chest pain and palpitations. Gastrointestinal:  Negative for abdominal pain, blood in stool, diarrhea, nausea and vomiting. Genitourinary:  Negative for dysuria and frequency. Musculoskeletal:  Positive for arthralgias, back pain, gait problem and joint swelling. Skin:  Positive for color change, pallor, rash and wound. Neurological:  Positive for weakness and light-headedness. Negative for dizziness and headaches. Psychiatric/Behavioral:  Positive for sleep disturbance. Objective: Intake/Output Summary (Last 24 hours) at 9/27/2022 1822  Last data filed at 9/27/2022 1015  Gross per 24 hour   Intake 200 ml   Output --   Net 200 ml        Vitals:   Vitals:    09/27/22 0731   BP: (!) 173/98   Pulse: 67   Resp: 17   Temp: 98.2 °F (36.8 °C)   SpO2: 97%       Physical Exam:   Physical Exam  Vitals and nursing note reviewed. Constitutional:       Appearance: Normal appearance. She is normal weight. HENT:      Head: Normocephalic. Nose: Nose normal.      Mouth/Throat:      Mouth: Mucous membranes are moist.   Eyes:      Conjunctiva/sclera: Conjunctivae normal.      Pupils: Pupils are equal, round, and reactive to light. Cardiovascular:      Rate and Rhythm: Normal rate and regular rhythm. Pulses: Normal pulses. Heart sounds: Normal heart sounds. No murmur heard. Pulmonary:      Effort: Pulmonary effort is normal.      Breath sounds: Normal breath sounds. No wheezing, rhonchi or rales. Abdominal:      General: Abdomen is flat. Bowel sounds are normal. There is no distension. Palpations: Abdomen is soft. Tenderness: There is no abdominal tenderness. Musculoskeletal:         General: Swelling, tenderness and signs of injury present. No deformity. Normal range of motion. Cervical back: Normal range of motion and neck supple. Right lower leg: No edema. Left lower leg: Edema present. Skin:     Coloration: Skin is not jaundiced or pale. Findings: Bruising, erythema and rash present. Neurological:      General: No focal deficit present.       Mental Status: She is alert and oriented to person, place, and time. Mental status is at baseline.    Psychiatric:         Mood and Affect: Mood normal.          Medications:   Medications:    scopolamine  1 patch TransDERmal Q72H    predniSONE  40 mg Oral Daily    gabapentin  800 mg Oral TID    Vitamin D  1,000 Units Oral Daily    vitamin B-12  500 mcg Oral Daily    miconazole   Topical BID    sodium chloride flush  5-40 mL IntraVENous 2 times per day    enoxaparin  1 mg/kg SubCUTAneous BID    ondansetron  4 mg IntraVENous 4 times per day    PARoxetine  30 mg Oral Daily    hydrOXYzine HCl  50 mg Oral BID    levothyroxine  125 mcg Oral Daily    levETIRAcetam  1,000 mg Oral BID    pantoprazole  40 mg Oral BID AC      Infusions:    sodium chloride      sodium chloride       PRN Meds: sodium chloride, , PRN  tiZANidine, 4 mg, Q8H PRN  sodium chloride flush, 5-40 mL, PRN  sodium chloride, , PRN  oxyCODONE, 5 mg, Q4H PRN   Or  oxyCODONE, 10 mg, Q4H PRN  HYDROmorphone, 0.25 mg, Q3H PRN   Or  HYDROmorphone, 0.5 mg, Q3H PRN  ondansetron, 4 mg, Q8H PRN   Or  ondansetron, 4 mg, Q6H PRN  polyethylene glycol, 17 g, Daily PRN  acetaminophen, 650 mg, Q6H PRN   Or  acetaminophen, 650 mg, Q6H PRN  promethazine, 25 mg, Q6H PRN        Labs      Recent Results (from the past 24 hour(s))   Creatinine    Collection Time: 09/27/22  5:50 AM   Result Value Ref Range    Creatinine 0.6 0.6 - 1.1 MG/DL    GFR Non-African American >60 >60 mL/min/1.73m2    GFR African American >60 >60 mL/min/1.73m2   CBC with Auto Differential    Collection Time: 09/27/22  5:50 AM   Result Value Ref Range    WBC 4.9 4.0 - 10.5 K/CU MM    RBC 2.99 (L) 4.2 - 5.4 M/CU MM    Hemoglobin 8.2 (L) 12.5 - 16.0 GM/DL    Hematocrit 26.0 (L) 37 - 47 %    MCV 87.0 78 - 100 FL    MCH 27.4 27 - 31 PG    MCHC 31.5 (L) 32.0 - 36.0 %    RDW 13.5 11.7 - 14.9 %    Platelets 082 986 - 441 K/CU MM    MPV 9.8 7.5 - 11.1 FL    Metamyelocytes Relative 2 (H) 0.0 %    Bands Relative 7 5 - 11 %    Segs Relative 48.0 36 - 66 %    Eosinophils % 3.0 0 - 3 %    Lymphocytes % 33.0 24 - 44 %    Monocytes % 7.0 (H) 0 - 4 %    Metamyelocytes Absolute 0.10 K/CU MM    Bands Absolute 0.34 K/CU MM    Segs Absolute 2.5 K/CU MM    Eosinophils Absolute 0.1 K/CU MM    Lymphocytes Absolute 1.6 K/CU MM    Monocytes Absolute 0.3 K/CU MM    Differential Type MANUAL DIFFERENTIAL     Anisocytosis 1+     WBC Morphology OCCASIONAL    C-Reactive Protein    Collection Time: 09/27/22  5:50 AM   Result Value Ref Range    CRP, High Sensitivity 19.7 (H) 0.0 - 5.0 mg/L   Procalcitonin    Collection Time: 09/27/22  5:50 AM   Result Value Ref Range    Procalcitonin 0.044    Cell Count with Differential, Body Fluid    Collection Time: 09/27/22  2:45 PM   Result Value Ref Range    Fluid Type SYNOVIAL FLUID INDEX    RBC, Fluid SPECIMEN CLOTTED /CU MM    WBC, Fluid SPECIMEN CLOTTED /CU MM    Neutrophil Count, Fluid 89 %    Lymphocytes, Body Fluid 7 %    Monocyte Count, Fluid 4 %    Mesothelial, Fluid 0 /100 WBC    Other Cells, Fluid 1 NUCLEATED RBC         Imaging/Diagnostics Last 24 Hours   XR KNEE LEFT (MIN 4 VIEWS)    Result Date: 9/27/2022  EXAMINATION: FOUR XRAY VIEWS OF THE LEFT KNEE 9/27/2022 11:40 am COMPARISON: None. HISTORY: ORDERING SYSTEM PROVIDED HISTORY: complaints of pain TECHNOLOGIST PROVIDED HISTORY: Reason for exam:->complaints of pain Reason for Exam: complaints of pain FINDINGS: No joint effusion. No acute fracture. Small tricompartmental osteophytes with mild medial compartment joint space loss. Remaining bones and joint spaces are maintained. No acute abnormality. Mild osteoarthritis     XR CHEST PORTABLE    Result Date: 9/25/2022  EXAMINATION: ONE XRAY VIEW OF THE CHEST 9/25/2022 6:27 pm COMPARISON: None.  HISTORY: ORDERING SYSTEM PROVIDED HISTORY: chest discomfort TECHNOLOGIST PROVIDED HISTORY: Reason for exam:->chest discomfort Reason for Exam: chest discomfort FINDINGS: Left chest wall Port-A-Cath line with tip in the SVC.  Mild cardiomegaly. The lungs are without acute focal process. There is no effusion or pneumothorax. The cardiomediastinal silhouette is without acute process. The osseous structures are without acute process. No acute process. Cardiomegaly.      Electronically signed by Dean Mireles MD, MD on 9/27/2022 at 6:22 PM

## 2022-09-27 NOTE — PROGRESS NOTES
Meme Nando (1970)    Daily Progress Note-  Denise DO Rudy,                  Today's Date:   9/27/2022          Subjective:     Patient seen and examined in bedside chair. Upon my initial entering the room the patient was very upset and pain. She states that earlier today she was ambulate to the bathroom and had a pain with ambulation without any specific injury and states that since then the knee has been increasing in swelling and painful. She denies any constitutional symptoms or any additional orthopedic complaints this time. She states that up until earlier this afternoon she actually has been feeling better. She states that the compressive dressing was removed last night and has not been in it throughout the day. Objective:     Vitals:    09/27/22 0731   BP: (!) 173/98   Pulse: 67   Resp: 17   Temp: 98.2 °F (36.8 °C)   SpO2: 97%        Review of Systems   Constitutional:  Positive for activity change. Negative for fatigue and fever. HENT:  Negative for sneezing, sore throat and voice change. Respiratory:  Negative for cough, shortness of breath and wheezing. Cardiovascular:  Negative for leg swelling. Gastrointestinal:  Negative for nausea and vomiting. Musculoskeletal:  Positive for arthralgias, joint swelling and myalgias. Negative for back pain, gait problem, neck pain and neck stiffness. Skin:  Negative for color change, rash and wound. Neurological:  Negative for weakness and numbness. Psychiatric/Behavioral:  Negative for behavioral problems, confusion and self-injury. Physical Exam:     The patient is awake and alert  Resting comfortably in bed    Operative extremity:    No dressing in place at this time. Sutures remain in place without any issue.   No sign of bleeding from the medial or lateral portal  Moderate effusion that is palpable with warmth but no erythema. Mildly tender to palpation no crepitance with gentle range of motion  Sensation and motor function intact distally. Patient wiggles toes without difficulty  Patient does have improved bruising on posterior thigh but none at the anterior thigh   no pain in joint above or below  Guarded range of motion of knee at this time, worse from yesterday. Continue pain with active and passive flexion but active motion is intact  Compartments soft, compressible  Positive Petar's which is unchanged from yesterday  Knee active range of motion intact. Patient is able to perform straight leg raise with no significant lag      LABS   CBC:   Recent Labs     09/25/22  0600 09/25/22  2300 09/26/22  1215 09/27/22  0550   WBC 3.8*  --   --  4.9   HGB 6.9* 8.3* 8.6* 8.2*     --   --  234       Preoperative right knee aspiration results - No change    Culture-aerobic and anaerobic demonstrate:   Prelim Report No growth to date No growth 36 to 48 hours No Aerobic or Anaerobic growth Holding for 3 weeks. Anaerobic culture further report to follow No anaerobes isolated so far, Further report to follow        Awaiting Gram stain    Crystals: NO CRYSTALS SEEN     Intraoperative fluid collection demonstrates:  Prelim Report No growth to date No growth 36 to 48 hours P with either collection    ESR, CRP improved but remains elevated (19.7 9/27 compared to 138.7 on 9/24/2022)    IMAGING   6 views of left knee demonstrate:     No joint effusion. No acute fracture. Small tricompartmental osteophytes   with mild medial compartment joint space loss. Remaining bones and joint   spaces are maintained. PROCEDURE:  Left Suprolateral patetellar Aspiration Procedure Note   Pre-operative Diagnosis: Left knee effusion, likely hemorrhagic effusion  Post-operative Diagnosis: same   Indications: Rule out septic joint   Anesthesia: Lidocaine 1%    Procedure Details   Verbal consent was obtained for the procedure.  The knee was prepped with chlorheaxadine scrub. A 18 gauge needle was advanced into the superolateral aspect of the knee into the superopatellar area without difficulty The space was then aspirated with moderate difficulty likely due to blood clot. The position of the needle was adjusted several times to help obtain as much fluid as possible and a total of 15cc of dark obvious blood without any turbulent was removed from the knee. A bandaid and compressive soft dressing was applied. Complications: None; patient tolerated the procedure well. Symptoms were improved immediately after the aspiration. Assessment and Plan     1. POD #7 s/p left knee arthroscopic irrigation and debridement    1:  Physical therapy consult for mobilization   -WBAT   -No precautions with the exception of no deep knee bending and no soaking of the wound. 2:  DVT prophylaxis   -Continue DVT prophylaxis per hospitalist  3:  Continue Pain Control   -wean to PO meds  4. Medical management per hospitalist service   -Continue to monitor Hgb.     -Continue antibiotic treatment per their plan -currently on vancomycin   -No recommendations at this time from myself about specific antibiotics  5:  D/C Planning:     -Per case management  6. Pain better controlled this evening  7. Please maintain compressive dressing. Please continue ice on the knee as well to help with swelling. I spoke to both the nurse and patient about this  8. I had a thorough conversation with the patient regarding her knee swelling and how I explained that this is likely bleeding in the knee from some sort of minimal contusion or aggravation of vasculature while ambulating that was made worse due to her current blood thinner.   I explained that we could attempt to do a aspiration which can help with some of the pain if enough fluid is taken off but would also be helpful in determining whether or not she has any type of underlying septic effusion and to rule this out although I felt very sure that this was hemorrhagic in nature. Patient agreed and the aspiration was performed with the above findings. I explained that I have a low concern for infection and this does appear to be strictly blood in the knee we will run the test necessary. The fluid was sent off for testing and I will make the patient n.p.o. overnight in case further intervention is needed. I will plan on seeing the patient tomorrow morning and evaluate her for my office and discuss if any intervention is needed but again I explained that I have a low concern. 9.  I also explained to the patient that her CRP shows improvement which is encouraging and means that there is less likely an active infection going on at this time. We will likely order this again before she is eventually discharged to monitor the trend. 10.  Currently no further orthopedic intervention plan at this time. Again, patient be n.p.o. overnight but I will give her her normal diet once the laboratory results confirm that this is not a septic effusion. No plan for suture removal for another week. If there becomes any concerning findings please contact me.   Tom Santamaria, DO

## 2022-09-27 NOTE — PROGRESS NOTES
Physical Therapy  Attempted to see pt, pt was crying in her room, pt states that she got up to go to the bathroom and was walking, she felt a snap in her L knee and it is now swelling, increased pain and is bruising. She also states The Dr. Damián Beckwith  going to aspirate it, pt states she is unable to do any therapy right now. Will cont. Tiffanie Mann.  Rupali Stone PTA

## 2022-09-27 NOTE — CONSULTS
HEMATOLOGY ONCOLOGY  Consultation Report    REASON FOR CONSULT    ? coagulopathy    CHIEF COMPLAINT    No chief complaint on file. HISTORY OF PRESENT ILLNESS   Jane Don is a 46 y.o. female past medical history significant for arthritis, depression, hypertension, kidney stone, hx of pancreatitis, seizures, thyroid disease, multiple left foot infections, Jet-en-Y gastric bypass, lupus, hypothyroidism,, chronic pancreatitis, anemia, fibomyalgia,  who presents with intractable nausea and vomiting for more than a month, decreased appetite, lightheadedness, epigastric abdominal pain, and leg pain. She reports she felt pop in back of her knee/calf area. Left knee effusion noted which was aspirated in the ed and noted to have removal of 30 cc of straw colored fluid. Reports she has had ongoing intermittent bleeding from aspiration site. She notes she has not had issues with bleeding in the past post surgically, no bleeding after dental extraction, or injury. She had heavy menses but is noted to have endometriosis. Noted to have non occlusive thrombus in left distal femoral and popliteal veins. She denies previous blood clot, no hormone use, no injury or instrumentation, no long travel (did travel to [place] recently) no family history of clots.      PAST MEDICAL HISTORY    Past Medical History:   Diagnosis Date    Arthritis     Depression     Disease of blood and blood forming organ     Hypertension     Immune deficiency disorder ([address]     Kidney stone     Seizures ([address]     Thyroid disease        SURGICAL HISTORY    Past Surgical History:   Procedure Laterality Date    ABDOMEN SURGERY      APPENDECTOMY      CHOLECYSTECTOMY      HERNIA REPAIR      HYSTERECTOMY (CERVIX STATUS UNKNOWN)      KNEE ARTHROSCOPY Left 9/20/2022    LEFT KNEE ARTHROSCOPIC IRRIGATION AND DEBRIDEMENT WITH DRAIN PLACEMENT performed by Olga Lira DO at [address] [facility] History reviewed. No pertinent family history. SOCIAL HISTORY    Social History     Socioeconomic History    Marital status:      Spouse name: None    Number of children: None    Years of education: None    Highest education level: None   Tobacco Use    Smoking status: Never   Substance and Sexual Activity    Alcohol use: Never    Drug use: Never    Sexual activity: Not Currently       REVIEW OF SYSTEMS    Constitutional:  Denies fever, chills, loss of appetite, weight loss, tiredness, fatigue or weakness   HEENT:  Denies swelling of neck glands  Respiratory:  Denies cough, shortness of breath or hemoptysis  Cardiovascular:  Denies chest pain, palpitations or swelling   GI:  Denies abdominal pain, nausea, vomiting, constipation or diarrhea   Musculoskeletal:  Denies back pain   Skin:  Denies rash   Neurologic:  Denies headache, focal weakness or sensory changes   All systems negative except as marked. PHYSICAL EXAM    Vitals: BP (!) 173/98 Comment: JACKIE Chery notified  Pulse 67   Temp 98.2 °F (36.8 °C) (Oral)   Resp 17   Ht 5' 4\" (1.626 m)   Wt 238 lb (108 kg)   SpO2 97%   BMI 40.85 kg/m²   CONSTITUTIONAL: awake, alert, cooperative, no apparent distress anxious  EYES: pupils equal, round sclera clear and conjunctiva normal  ENT: Normocephalic, without obvious abnormality, atraumatic  NECK: supple, symmetrical  HEMATOLOGIC/LYMPHATIC: no cervical, supraclavicular or axillary lymphadenopathy   LUNGS: no increased work of breathing and clear to auscultation   CARDIOVASCULAR: regular rate and rhythm, normal S1 and S2, no murmur noted  ABDOMEN: normal bowel sounds x 4, soft, non-distended, non-tender, no masses palpated, no hepatosplenomgaly   MUSCULOSKELETAL: full range of motion noted, tone is normal. Stitches to left knee, bruising to medial left thigh  NEUROLOGIC: awake, alert, oriented to name, place and time. Motor skills grossly intact.    SKIN: Normal skin color, texture, turgor and no jaundice. Skin appears intact   EXTREMITIES: no LE edema    LABORATORY RESULTS  CBC:   Recent Labs     09/25/22  0600 09/25/22  2300 09/26/22  1215 09/27/22  0550   WBC 3.8*  --   --  4.9   HGB 6.9* 8.3* 8.6* 8.2*     --   --  234     BMP:    Recent Labs     09/25/22  0600 09/27/22  0550     --    K 3.9  --      --    CO2 25  --    BUN 7  --    CREATININE 0.6 0.6   GLUCOSE 87  --      Hepatic: No results for input(s): AST, ALT, ALB, BILITOT, ALKPHOS in the last 72 hours. INR: No results for input(s): INR in the last 72 hours. RADIOLOGY REPORTS  9/19/22  Impression   1. Status post Jet-en-Y gastric bypass. Gastric pouch is mildly distended   with ingested contents but is otherwise nonspecific. This appears slightly   more distended when compared to recent CTs and could be related to recent   meal. The esophagus and Jet limb is normal in caliber. 2. Stable pneumobilia. 3. No other acute abdominal or pelvic abnormality. 4. Punctate nonobstructing left renal calculus. 9/19/22 Venous doppler  Impression   1. Nonocclusive thrombus in the left distal femoral and popliteal veins. 2. Anechoic suprapatellar fluid collection may represent joint effusion or   fluid within the prepatellar bursa. Findings were discussed with Doron Bolivar at 4:34 pm on 9/19/2022.     9/19/22     Impression   No acute osseous abnormality of the left knee. CXR 9/25/22  Impression   No acute process. Cardiomegaly. ASSESSMENT/RECOMMENDATION    ? Concern for coagulopathy- No present bleeding, no history of bleeding with surgery/dental procedures. PT/PTT/INR were normal. Do not suspect she has bleeding disorder based on history and present labs. Hemarthrosis - usually hemarthrosis is caused by clotting factor deficiency then platelet disorder. We don't think she has clotting factor deficiency since she has normal PT/PTT/INR.  We also don't think she has platelet disorder since her bleeding history is not consistent with it. However, we will plan to order platelet function assay an outpatient since it is not available inpatient. DVT- left distal femoral and popliteal veins, first episode. On lovenox. Hx of Lupus. Rule out APS. We will follow the patient. Thank you for allowing me to participate in the care of this very pleasant patient. I have independently evaluated and examined this patient today. I have reviewed radiologic and biochemical tests on this patient. Management Plan is developed mutually with Beba Posada NP.  I have reviewed above note and agree with assessment and plan

## 2022-09-27 NOTE — CONSULTS
Infectious Disease Consult Note    Date: 2022   Patient Name: Chhaya Ramesh  : 1970   MRN: [id number]  Admission Date: 2022  Hospital Day: Hospital Day: 9  Attending Provider: Maria Dolores Gómez MD  Isolation: No Current Isolation Precautions  Impression:  Left Knee Inflammation: Suspected Septic Arthritis. Multi-morbidity: Per PMHx  Obesity  MRSA Bacteremia  Systemic Lupus Erythematous  Impression/Plan  Left knee swelling: Suspected septic arthritis, however gram stain is negative and culture is negative as well. Knee does not appear markedly inflamed, CRP and ESR at admission were not elevated as well. Presently has hemarthrosis. At the moment we will continue no  vancomycin and trend CRP. If CRP continues to rise, then there is a possibility of microbial infection and will select appropriate antibiotic. Acute DVT in left distal femoral and popliteal veins: DVT in the presence of stated SLE. Concerned also about dysfunctional bleeding. Advise hematology/oncology consultation. Recommendations pending.  - Orthopedics Dr. Gerard Elizabeth continues close monitoring of patient progress and examined patient on 22:. Ortho plans to order a new ESR and CRP on 22 to evaluate trend. At present orthopedics observed no fluid or concerning infectious findings on physical exam for septic knee effusion. Ortho to continue to monitor   Therapeutic: No Antibiotic Therapy at this time  Diagnostic:  Trend CRP x 3 days. Follow up:  22: Monitor and await gram stain and fluid culture: Pending  22- Blood Culture: no growth at 5 days    -----------------------------------------------------------------------------------------------------  REASON FOR CONSULT: Infective syndrome: Possible left knee septic joint  Requested by:Jamar Royal MD  HPI:Patient is a 46 y.o.  White obese female with PMH of left MSSA metatarsal osteomyelitis, gastric bypass surgery 2021 (Dr. Darwin Dorsey), chronic abdominal pain/pancreatitis, HTN, seizures, MRSA Bacteremia, Pseudomonas aeruginosa catheter related bloodstream infection (03/2021), Lupus (not followed by Rheumatology), seizures. Pt presented to [facility] ER 09/19/22 for epigastrium pain, nausea vomiting,and concern of blood clot in her left lower extremity. Pt stated approximately 1 week prior to ER visit, left knee became tender, swollen, red and hot to touch. Pain and tenderness were primarily located anterior/posterior area of left knee. Pt denies any injury/trauma to to left leg/knee area, no previous history of surgery to left knee, but surgery to let foot per [facility]. Pt denies chills/vomiting but does report mild nausea and one episode of fever of 99.6 as well as waking up with clothes soaked in sweat x 1 prior to ER visit. Pt Denies fever/chills/diarrhea/vomiting since admission. Pt has history of MRSA infection of port 01/2020, line was removed and New Medi-Port was Placed at later date 07/2020 and then replaced of second time on 08/2021. Pt was admitted 9/19/2022 for further evaluation and management of: possible left knee septic joint  Review & Summary of Old Records  Multiple left foot infections. Followed by Podiatry: Dr. Debbie Ziegler. 07/2019: Pseudomonas aeruginosa (Chest)  07/2019: Pseudomonas aeruginosa Blood Culture  03/2021: Left thigh: Surgical Culture Staphycoccus epidermidis  01/2020 MRSA: Source: Port    ROS: Other systems reviewed Including eyes, ENT, respiratory, cardiovascular, GI, , dermatologic, neurologic, psych, hem/lymphatic, musculoskeletal and endocrine were negative other than what is mentioned above.    Patient Active Problem List    Diagnosis Date Noted    Acute deep vein thrombosis (DVT) of popliteal vein of left lower extremity ([address] 09/27/2022    Swelling of joint of left knee 09/27/2022    Acute bilateral deep vein thrombosis (DVT) of femoral veins ([address] 09/19/2022     Past Medical History:   Diagnosis Date    Arthritis Depression     Disease of blood and blood forming organ     Hypertension     Immune deficiency disorder ([facility]     Kidney stone     Seizures ([facility]     Thyroid disease       Past Surgical History:   Procedure Laterality Date    ABDOMEN SURGERY      APPENDECTOMY      CHOLECYSTECTOMY      HERNIA REPAIR      HYSTERECTOMY (CERVIX STATUS UNKNOWN)      KNEE ARTHROSCOPY Left 9/20/2022    LEFT KNEE ARTHROSCOPIC IRRIGATION AND DEBRIDEMENT WITH DRAIN PLACEMENT performed by Rasheed Crisostomo DO at [address]       06/2019: Left Foot: FIRST METATARSAL DEBRIDEMENT INCISION AND DRAINAGE. EXCISION OF ULCER. RESECTION OF BONE. 1ST METATARSAL POWER LAVAGE AND BONE CEMENT  04/2022: LEFT FOOT ABSCESS DEBRIDEMENT INCISION AND DRAINAGE, CYST REMOVAL - Left with Edward Stevens DPM on 4/15/2022   History reviewed. No pertinent family history. Immunization History   Administered Date(s) Administered    COVID-19, PFIZER PURPLE top, DILUTE for use, (age 15 y+), 30mcg/0.3mL 03/30/2021, 05/18/2021, 01/07/2022     Infectious disease related family history - not contibutory. 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 No current employment  No recent travel of significance. No recent unusual exposures. NO pets: Pt owns one dog. ALLERGIES:  Allergies   Allergen Reactions    Haldol [Haloperidol] Other (See Comments)     States \"shook severely and had to have Benadryl\"    Asa [Aspirin]     Compazine [Prochlorperazine]     Reglan [Metoclopramide]     Toradol [Ketorolac Tromethamine]       MEDICATIONS:  Reviewed and are per the chart/EMR.     scopolamine  1 patch TransDERmal Q72H    predniSONE  40 mg Oral Daily    gabapentin  800 mg Oral TID    Vitamin D  1,000 Units Oral Daily    vitamin B-12  500 mcg Oral Daily    miconazole   Topical BID    sodium chloride flush  5-40 mL IntraVENous 2 times per day    enoxaparin  1 mg/kg SubCUTAneous BID    ondansetron  4 mg IntraVENous 4 times per day    PARoxetine  30 mg Oral Daily    hydrOXYzine HCl  50 mg Oral BID    levothyroxine  125 mcg Oral Daily    levETIRAcetam  1,000 mg Oral BID    pantoprazole  40 mg Oral BID AC     LABS:  Lab Results   Component Value Date    WBC 4.9 09/27/2022    HGB 8.2 (L) 09/27/2022    HCT 26.0 (L) 09/27/2022    MCV 87.0 09/27/2022     09/27/2022    LYMPHOPCT 33.0 09/27/2022    RBC 2.99 (L) 09/27/2022    MCH 27.4 09/27/2022    MCHC 31.5 (L) 09/27/2022    RDW 13.5 09/27/2022    PROCAL 0.044 09/27/2022     ESR:  09/19: 19  09/24: 60 elevated  CRP:   09/19: 6.5  09/24: 138.7 elevated  09/27: 19.7  Culture, Aerorbic and Anarobic, Joint  09/19: No growth to date No growth 36 to 48 hours No Aerobic or Anaerobic growth Holding for 3 weeks. No Aerobic or Anaerobic growth within week 1. No anaerobes isolated  Body Fluid Other:  09/19: Synovial Fluid  RBC's: 110,000  Lymphocytes: 10  Monocytes: 0  Neutrophils: 90  WBC: 73,100  Crystals: No Crystals noted  Culture, Body Fluid: Left Knee  09/20: No growth to date No growth 36 to 48 hours No Aerobic or Anaerobic growth Holding for 3 weeks. No Aerobic or Anaerobic growth within week 1. Blood Culture:   09/22: NO GROWTH AT 5 DAYS   Lab Results   Component Value Date    CREATININE 0.6 09/27/2022    BUN 7 09/25/2022     09/25/2022    K 3.9 09/25/2022     09/25/2022    CO2 25 09/25/2022     Hepatic Function Panel:   Lab Results   Component Value Date/Time    ALKPHOS 114 09/21/2022 09:04 PM    ALT 13 09/21/2022 09:04 PM    AST 23 09/21/2022 09:04 PM    PROT 6.5 09/21/2022 09:04 PM    LABALBU 3.8 09/21/2022 09:04 PM     Microbiology:   No results for input(s): BC in the last 72 hours. No results for input(s): Estela Scale in the last 72 hours.   No results for input(s): Erasmo Eaton in the last 72 hours. Lab Results   Component Value Date/Time    BACTERIA RARE 09/19/2022 01:25 PM    WBCUA 34 09/19/2022 01:25 PM    RBCUA NONE SEEN 09/19/2022 01:25 PM     No results found for: URRFLXCULT  No results for input(s): WNDABS in the last 72 hours. No results for input(s): CULTRESP in the last 72 hours. ANTIBIOTICS:  Present:  Vancomycin- 09/19-26  Past:  Ceftriaxone- 09/19  Cefazolin- 09/20    -----------------------------------------------------------------------------------------------------  Vital Signs:  Visit Vitals  BP (!) 173/98   Pulse 67   Temp 98.2 °F (36.8 °C) (Oral)   Resp 17   Ht 5' 4\" (1.626 m)   Wt 238 lb (108 kg)   SpO2 97%   BMI 40.85 kg/m²       Physical Exam:  Gen: alert and oriented X3, Pleasant and interactive and patient appears in no distress  Skin: Scattered Ecchymosis patches on arms and legs with ecchymotic patch that has decreased in size since previous exam noted on anterior/medial left thigh. Remainder of skin pink, warm, dry,no erythema, no induration, no rashes, no stigmata of endocarditis. Wounds: C/I, scant serosanguineous drainage noted on patient's bed sheet  HEMT: AT/NC Oropharynx pink, moist, and without lesions or exudates; dentition in good state of repair  Eyes: PERRLA, EOMI, conjunctiva pink, sclera anicteric. Neck: Supple. Trachea midline. No LAD. Chest: no distress and CTA. Good air movement. Heart: RRR and no MRG. Abd: soft, non-distended, no tenderness, no hepatomegaly. Normoactive bowel sounds. Ext: no clubbing, cyanosis, or edema  Catheter Site: [facility] upper left chest is without erythema or tenderness. Diagnostic Studies: All pertinent images/lab and microbiology reports and were reviewed as a part of this hospitalization. 09/19: CT abdomen pelvis with contrast:  Impression   1. Status post Jet-en-Y gastric bypass. Gastric pouch is mildly distended   with ingested contents but is otherwise nonspecific. This appears slightly   more distended when compared to recent CTs and could be related to recent   meal. The esophagus and Jet limb is normal in caliber. 2. Stable pneumobilia. 3. No other acute abdominal or pelvic abnormality. 4. Punctate nonobstructing left renal calculus. 09/19: Duplex Venous Ultrasound of the Left Lower Extremity. Impression   1. Nonocclusive thrombus in the left distal femoral and popliteal veins. 2. Anechoic suprapatellar fluid collection may represent joint effusion or   fluid within the prepatellar bursa. Findings were discussed with CHARLIE rToncoso at 4:34 pm on 9/19/2022.     09/19 X-Ray Left Knee:  Impression   No acute osseous abnormality of the left knee. 09/25 Chest X ray Portable:  Impression   No acute process. Cardiomegaly. I have examined this patient and available medical records on this date and have made the above observations, conclusions and recommendations.   Electronically signed by: Electronically signed by Amy Ko PA-C on 9/27/2022 at 12:29 PM

## 2022-09-27 NOTE — CARE COORDINATION
Reviewed chart and ID has no plan for atb at this time. Plan will be home with [facility] for nursing and therapy. Spoke with John Redman at [facility] and they continuing to follow pt.

## 2022-09-27 NOTE — PROGRESS NOTES
Physician Progress Note      Jen Magallanes  CSN #:                  [id number]  :                       1970  ADMIT DATE:       2022 12:26 PM  DISCH DATE:  RESPONDING  PROVIDER #:        Rocio Alcantara MD          QUERY TEXT:    Pt admitted with L knee effusion and has anemia documented. If possible,   please document in progress notes and discharge summary further specificity   regarding the acuity and type of anemia:    The medical record reflects the following:  Risk Factors: surgical procedure, on anticoagulant  Clinical Indicators: Hgb on  was 10.6. Pt had left knee arthroscopic   irrigation and drain placement on . Hgb dropped to 8/2 as of . PN   dated  by Dr. David Castaneda, \"May be having postop blood loss. Patient currently   on anticoagulation.-Transfused 1 unit PRBC. Improved hemoglobin 8.6. Hemoccult positive. \"  Treatment: monitor H/H, transfused PRBC, labs    Thank you,  Servando Coates RN  [phone]  Options provided:  -- Anemia due to acute blood loss  -- Anemia due to postoperative blood loss  -- Other - I will add my own diagnosis  -- Disagree - Not applicable / Not valid  -- Disagree - Clinically unable to determine / Unknown  -- Refer to Clinical Documentation Reviewer    PROVIDER RESPONSE TEXT:    This patient has anemia due to postoperative blood loss.     Query created by: Terri Crowley on 2022 11:51 AM      Electronically signed by:  Rocio Alcantara MD 2022 11:56 AM

## 2022-09-28 LAB
ADENOVIRUS F 40 41 PCR: NOT DETECTED
ASTROVIRUS PCR: NOT DETECTED
CAMPYLOBACTER PCR: NOT DETECTED
CRYPTOSPORIDIUM PCR: NOT DETECTED
CRYSTALS, FLUID: NORMAL
CYCLOSPORA CAYETANENSIS PCR: NOT DETECTED
DOSE AMOUNT: NORMAL
DOSE TIME: NORMAL
E COLI 0157 PCR: NOT DETECTED
E COLI ENTEROAGGREGATIVE PCR: NOT DETECTED
E COLI ENTEROPATHOGENIC PCR: NOT DETECTED
E COLI ENTEROTOXIGENIC PCR: NOT DETECTED
E COLI SHIGA LIKE TOXIN PCR: NOT DETECTED
E COLI SHIGELLA/ENTEROINVASIVE PCR: NOT DETECTED
ENTAMOEBA HISTOLYTICA PCR: NOT DETECTED
GIARDIA LAMBLIA PCR: NOT DETECTED
NOROVIRUS GI GII PCR: NOT DETECTED
PLESIOMONAS SHIGELLOIDES PCR: NOT DETECTED
ROTAVIRUS A PCR: NOT DETECTED
SALMONELLA PCR: NOT DETECTED
SAPOVIRUS PCR: NOT DETECTED
VANCOMYCIN RANDOM: 4.4 UG/ML
VIBRIO CHOLERAE PCR: ABNORMAL
VIBRIO PCR: ABNORMAL
YERSINIA ENTEROCOLITICA PCR: NOT DETECTED

## 2022-09-28 PROCEDURE — 85025 COMPLETE CBC W/AUTO DIFF WBC: CPT

## 2022-09-28 PROCEDURE — 1200000000 HC SEMI PRIVATE

## 2022-09-28 PROCEDURE — 6360000002 HC RX W HCPCS: Performed by: STUDENT IN AN ORGANIZED HEALTH CARE EDUCATION/TRAINING PROGRAM

## 2022-09-28 PROCEDURE — 80202 ASSAY OF VANCOMYCIN: CPT

## 2022-09-28 PROCEDURE — 6370000000 HC RX 637 (ALT 250 FOR IP): Performed by: HOSPITALIST

## 2022-09-28 PROCEDURE — 97535 SELF CARE MNGMENT TRAINING: CPT

## 2022-09-28 PROCEDURE — 97116 GAIT TRAINING THERAPY: CPT

## 2022-09-28 PROCEDURE — 97530 THERAPEUTIC ACTIVITIES: CPT

## 2022-09-28 PROCEDURE — 6370000000 HC RX 637 (ALT 250 FOR IP): Performed by: STUDENT IN AN ORGANIZED HEALTH CARE EDUCATION/TRAINING PROGRAM

## 2022-09-28 PROCEDURE — 99232 SBSQ HOSP IP/OBS MODERATE 35: CPT | Performed by: NURSE PRACTITIONER

## 2022-09-28 PROCEDURE — 99232 SBSQ HOSP IP/OBS MODERATE 35: CPT | Performed by: INTERNAL MEDICINE

## 2022-09-28 PROCEDURE — 2580000003 HC RX 258: Performed by: STUDENT IN AN ORGANIZED HEALTH CARE EDUCATION/TRAINING PROGRAM

## 2022-09-28 PROCEDURE — 94761 N-INVAS EAR/PLS OXIMETRY MLT: CPT

## 2022-09-28 RX ADMIN — OXYCODONE HYDROCHLORIDE 10 MG: 10 TABLET ORAL at 13:55

## 2022-09-28 RX ADMIN — GABAPENTIN 800 MG: 400 CAPSULE ORAL at 20:48

## 2022-09-28 RX ADMIN — LEVETIRACETAM 1000 MG: 500 TABLET, FILM COATED ORAL at 20:48

## 2022-09-28 RX ADMIN — OXYCODONE HYDROCHLORIDE 10 MG: 10 TABLET ORAL at 18:20

## 2022-09-28 RX ADMIN — TIZANIDINE 4 MG: 4 TABLET ORAL at 13:55

## 2022-09-28 RX ADMIN — ONDANSETRON 4 MG: 2 INJECTION INTRAMUSCULAR; INTRAVENOUS at 04:38

## 2022-09-28 RX ADMIN — PANTOPRAZOLE SODIUM 40 MG: 40 TABLET, DELAYED RELEASE ORAL at 05:28

## 2022-09-28 RX ADMIN — GABAPENTIN 800 MG: 400 CAPSULE ORAL at 09:24

## 2022-09-28 RX ADMIN — PAROXETINE HYDROCHLORIDE 30 MG: 20 TABLET, FILM COATED ORAL at 09:32

## 2022-09-28 RX ADMIN — ONDANSETRON 4 MG: 2 INJECTION INTRAMUSCULAR; INTRAVENOUS at 12:31

## 2022-09-28 RX ADMIN — LEVETIRACETAM 1000 MG: 500 TABLET, FILM COATED ORAL at 09:24

## 2022-09-28 RX ADMIN — SODIUM CHLORIDE, PRESERVATIVE FREE 10 ML: 5 INJECTION INTRAVENOUS at 09:17

## 2022-09-28 RX ADMIN — GABAPENTIN 800 MG: 400 CAPSULE ORAL at 13:55

## 2022-09-28 RX ADMIN — HYDROXYZINE HYDROCHLORIDE 50 MG: 50 TABLET, FILM COATED ORAL at 09:24

## 2022-09-28 RX ADMIN — HYDROMORPHONE HYDROCHLORIDE 0.5 MG: 1 INJECTION, SOLUTION INTRAMUSCULAR; INTRAVENOUS; SUBCUTANEOUS at 18:57

## 2022-09-28 RX ADMIN — PROMETHAZINE HYDROCHLORIDE 25 MG: 25 INJECTION INTRAMUSCULAR; INTRAVENOUS at 15:11

## 2022-09-28 RX ADMIN — CYANOCOBALAMIN TAB 1000 MCG 500 MCG: 1000 TAB at 09:24

## 2022-09-28 RX ADMIN — ENOXAPARIN SODIUM 105 MG: 150 INJECTION SUBCUTANEOUS at 09:25

## 2022-09-28 RX ADMIN — HYDROMORPHONE HYDROCHLORIDE 0.5 MG: 1 INJECTION, SOLUTION INTRAMUSCULAR; INTRAVENOUS; SUBCUTANEOUS at 04:37

## 2022-09-28 RX ADMIN — HYDROXYZINE HYDROCHLORIDE 50 MG: 50 TABLET, FILM COATED ORAL at 20:48

## 2022-09-28 RX ADMIN — HYDROMORPHONE HYDROCHLORIDE 0.5 MG: 1 INJECTION, SOLUTION INTRAMUSCULAR; INTRAVENOUS; SUBCUTANEOUS at 09:13

## 2022-09-28 RX ADMIN — SODIUM CHLORIDE, PRESERVATIVE FREE 10 ML: 5 INJECTION INTRAVENOUS at 21:04

## 2022-09-28 RX ADMIN — PREDNISONE 40 MG: 20 TABLET ORAL at 09:23

## 2022-09-28 RX ADMIN — Medication 1000 UNITS: at 09:24

## 2022-09-28 RX ADMIN — ENOXAPARIN SODIUM 105 MG: 150 INJECTION SUBCUTANEOUS at 22:06

## 2022-09-28 RX ADMIN — LEVOTHYROXINE SODIUM 125 MCG: 25 TABLET ORAL at 05:28

## 2022-09-28 RX ADMIN — ONDANSETRON 4 MG: 2 INJECTION INTRAMUSCULAR; INTRAVENOUS at 18:19

## 2022-09-28 RX ADMIN — HYDROMORPHONE HYDROCHLORIDE 0.5 MG: 1 INJECTION, SOLUTION INTRAMUSCULAR; INTRAVENOUS; SUBCUTANEOUS at 22:07

## 2022-09-28 RX ADMIN — PANTOPRAZOLE SODIUM 40 MG: 40 TABLET, DELAYED RELEASE ORAL at 15:48

## 2022-09-28 RX ADMIN — HYDROMORPHONE HYDROCHLORIDE 0.5 MG: 1 INJECTION, SOLUTION INTRAMUSCULAR; INTRAVENOUS; SUBCUTANEOUS at 12:31

## 2022-09-28 RX ADMIN — HYDROMORPHONE HYDROCHLORIDE 0.5 MG: 1 INJECTION, SOLUTION INTRAMUSCULAR; INTRAVENOUS; SUBCUTANEOUS at 15:47

## 2022-09-28 ASSESSMENT — PAIN SCALES - GENERAL
PAINLEVEL_OUTOF10: 6
PAINLEVEL_OUTOF10: 6
PAINLEVEL_OUTOF10: 8
PAINLEVEL_OUTOF10: 6
PAINLEVEL_OUTOF10: 8
PAINLEVEL_OUTOF10: 8
PAINLEVEL_OUTOF10: 7
PAINLEVEL_OUTOF10: 9
PAINLEVEL_OUTOF10: 8

## 2022-09-28 ASSESSMENT — ENCOUNTER SYMPTOMS
BACK PAIN: 1
SINUS PRESSURE: 0
EYE DISCHARGE: 0
CHEST TIGHTNESS: 0
SINUS PAIN: 0
DIARRHEA: 0
SORE THROAT: 0
COLOR CHANGE: 0
VOMITING: 0
VOICE CHANGE: 0
ABDOMINAL PAIN: 0
BLOOD IN STOOL: 0
COUGH: 0
BACK PAIN: 0
SHORTNESS OF BREATH: 0
EYE PAIN: 0
WHEEZING: 0
NAUSEA: 0
COLOR CHANGE: 1
COUGH: 1

## 2022-09-28 ASSESSMENT — PAIN DESCRIPTION - LOCATION
LOCATION: KNEE
LOCATION: LEG;KNEE
LOCATION: KNEE
LOCATION: LEG;KNEE
LOCATION: LEG
LOCATION: LEG;KNEE
LOCATION: LEG
LOCATION: LEG;KNEE
LOCATION: KNEE
LOCATION: KNEE
LOCATION: LEG;KNEE

## 2022-09-28 ASSESSMENT — PAIN DESCRIPTION - DESCRIPTORS
DESCRIPTORS: THROBBING
DESCRIPTORS: BURNING;SPASM
DESCRIPTORS: PRESSURE;THROBBING
DESCRIPTORS: THROBBING
DESCRIPTORS: BURNING
DESCRIPTORS: ACHING;THROBBING;BURNING
DESCRIPTORS: PRESSURE;THROBBING

## 2022-09-28 ASSESSMENT — PAIN DESCRIPTION - ORIENTATION
ORIENTATION: LEFT

## 2022-09-28 ASSESSMENT — PAIN - FUNCTIONAL ASSESSMENT
PAIN_FUNCTIONAL_ASSESSMENT: ACTIVITIES ARE NOT PREVENTED

## 2022-09-28 NOTE — CONSULTS
Infectious Disease Consult Note    Date: 2022   Patient Name: Lydia Beebe  : 1970   MRN: [id number]  Admission Date: 2022  Hospital Day: Hospital Day: 8  Attending Provider: Marly Barlow MD  Isolation: No Current Isolation Precautions  Impression:  Left Knee Inflammation: Suspected Septic Arthritis. Multi-morbidity: Per PMHx  Obesity. BMI 40.85 kg per metered square  MRSA Bacteremia  Systemic Lupus Erythematous  Impression/Plan  Left knee swelling: Pt reports mild pain but pain has reduced significantly post Left knee after Suspected septic arthritis, however gram stain is negative and culture is negative as well. Knee does not appear markedly inflamed, CRP and ESR at admission were not elevated as well. Presently has hemarthrosis. At the moment we will continue no  vancomycin and trend CRP. If CRP continues to rise, then there is a possibility of microbial infection and will select appropriate antibiotic. Acute DVT in left distal femoral and popliteal veins: DVT in the presence of stated SLE. Concerned also about dysfunctional bleeding. Appreciate hematology/oncology consultation. Recommendations: will plan to order platelet function assay as an outpatient since. Test not available inpatient. Will Rule out APS. - Orthopedics Dr. Sina Starr continues close monitoring of patient progress and examined patient on 22 and performed Superolateral pattellar aspiration 22. Approximately 15ml dark obvious blood without any turbulent was removed. Fluid was sent for culture. 22:. Ortho to continue to monitor pt progress. Therapeutic: Continue No Antibiotic Therapy at this time  Diagnostic:  Trend CRP x 3 days.    Follow up:  22: Monitor and await gram stain and fluid culture: Pending  22- Blood Culture: no growth at 5 days  -----------------------------------------------------------------------------------------------------  REASON FOR CONSULT: Infective syndrome: Possible left knee septic joint  Requested by:Jamar Royal MD  HPI:Patient is a 46 y.o. White obese female with PMH of left MSSA metatarsal osteomyelitis, gastric bypass surgery 08/2021 (Dr. Marixa Avendaño), chronic abdominal pain/pancreatitis, HTN, seizures, MRSA Bacteremia, Pseudomonas aeruginosa catheter related bloodstream infection (03/2021), Lupus (not followed by Rheumatology), seizures. Pt presented to [facility] ER 09/19/22 for epigastrium pain, nausea vomiting,and concern of blood clot in her left lower extremity. Pt stated approximately 1 week prior to ER visit, left knee became tender, swollen, red and hot to touch. Pain and tenderness were primarily located anterior/posterior area of left knee. Pt denies any injury/trauma to to left leg/knee area, no previous history of surgery to left knee, but surgery to let foot per PSH. Pt denies chills/vomiting but does report mild nausea and one episode of fever of 99.6 as well as waking up with clothes soaked in sweat x 1 prior to ER visit. Pt Denies fever/chills/diarrhea/vomiting since admission. Pt has history of MRSA infection of port 01/2020, line was removed and New Medi-Port was Placed at later date 07/2020 and then replaced of second time on 08/2021. Pt was admitted 9/19/2022 for further evaluation and management of: possible left knee septic joint  Review & Summary of Old Records  Multiple left foot infections. Followed by Podiatry: Dr. Jodee Ramirez. 07/2019: Pseudomonas aeruginosa (Chest)  07/2019: Pseudomonas aeruginosa Blood Culture  03/2021: Left thigh: Surgical Culture Staphycoccus epidermidis  01/2020 MRSA: Source: Port    ROS: Other systems reviewed Including eyes, ENT, respiratory, cardiovascular, GI, , dermatologic, neurologic, psych, hem/lymphatic, musculoskeletal and endocrine were negative other than what is mentioned above.    Patient Active Problem List    Diagnosis Date Noted    Acute deep vein thrombosis (DVT) of popliteal vein of left lower extremity ([facility] [address] 09/27/2022    Swelling of joint of left knee 09/27/2022    Acute bilateral deep vein thrombosis (DVT) of femoral veins ([facility] [address] 09/19/2022     Past Medical History:   Diagnosis Date    Arthritis     Depression     Disease of blood and blood forming organ     Hypertension     Immune deficiency disorder ([facility] [address]     Kidney stone     Seizures ([facility] [address]     Thyroid disease       Past Surgical History:   Procedure Laterality Date    ABDOMEN SURGERY      APPENDECTOMY      CHOLECYSTECTOMY      HERNIA REPAIR      HYSTERECTOMY (CERVIX STATUS UNKNOWN)      KNEE ARTHROSCOPY Left 9/20/2022    LEFT KNEE ARTHROSCOPIC IRRIGATION AND DEBRIDEMENT WITH DRAIN PLACEMENT performed by Chika Allison DO at [address]       06/2019: Left Foot: FIRST METATARSAL DEBRIDEMENT INCISION AND DRAINAGE. EXCISION OF ULCER. RESECTION OF BONE. 1ST METATARSAL POWER LAVAGE AND BONE CEMENT  04/2022: LEFT FOOT ABSCESS DEBRIDEMENT INCISION AND DRAINAGE, CYST REMOVAL - Left with Isacc , DPM on 4/15/2022   History reviewed. No pertinent family history. Immunization History   Administered Date(s) Administered    COVID-19, PFIZER PURPLE top, DILUTE for use, (age 15 y+), 30mcg/0.3mL 03/30/2021, 05/18/2021, 01/07/2022     Infectious disease related family history - not contibutory. 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 No current employment  No recent travel of significance. No recent unusual exposures. NO pets: Pt owns one dog.   ALLERGIES:  Allergies   Allergen Reactions    Haldol [Haloperidol] Other (See Comments)     States \"shook severely and had to have Benadryl\"    Asa [Aspirin]     Compazine [Prochlorperazine]     Reglan [Metoclopramide] Toradol [Ketorolac Tromethamine]       MEDICATIONS:  Reviewed and are per the chart/EMR. scopolamine  1 patch TransDERmal Q72H    predniSONE  40 mg Oral Daily    gabapentin  800 mg Oral TID    Vitamin D  1,000 Units Oral Daily    vitamin B-12  500 mcg Oral Daily    miconazole   Topical BID    sodium chloride flush  5-40 mL IntraVENous 2 times per day    enoxaparin  1 mg/kg SubCUTAneous BID    ondansetron  4 mg IntraVENous 4 times per day    PARoxetine  30 mg Oral Daily    hydrOXYzine HCl  50 mg Oral BID    levothyroxine  125 mcg Oral Daily    levETIRAcetam  1,000 mg Oral BID    pantoprazole  40 mg Oral BID AC     LABS:  Lab Results   Component Value Date    WBC 4.9 09/27/2022    HGB 8.2 (L) 09/27/2022    HCT 26.0 (L) 09/27/2022    MCV 87.0 09/27/2022     09/27/2022    LYMPHOPCT 33.0 09/27/2022    RBC 2.99 (L) 09/27/2022    MCH 27.4 09/27/2022    MCHC 31.5 (L) 09/27/2022    RDW 13.5 09/27/2022    PROCAL 0.044 09/27/2022     ESR:  09/19: 19  09/24: 60 elevated  CRP:   09/19: 6.5  09/24: 138.7 elevated  09/27: 19.7  Culture, Aerorbic and Anarobic, Joint  09/19: No growth to date No growth 36 to 48 hours No Aerobic or Anaerobic growth Holding for 3 weeks. No Aerobic or Anaerobic growth within week 1. No anaerobes isolated  Body Fluid Other:  09/19: Synovial Fluid  RBC's: 110,000  Lymphocytes: 10  Monocytes: 0  Neutrophils: 90%  WBC: 73,100  Crystals: No Crystals noted    09/27: Synovial Fluid:  09/27/22 1445     Fluid Type INDEX SYNOVIAL FLUID    Comment: LEFT KNEE   RBC, Fluid /CU MM SPECIMEN CLOTTED    WBC, Fluid /CU MM SPECIMEN CLOTTED    Neutrophil Count, Fluid % 89    Lymphocytes, Body Fluid % 7    Monocyte Count, Fluid % 4    Mesothelial, Fluid /100 WBC 0    Other Cells, Fluid  1 NUCLEATED RBC      Culture, Body Fluid: Left Knee  09/20: No growth to date No growth 36 to 48 hours No Aerobic or Anaerobic growth Holding for 3 weeks. No Aerobic or Anaerobic growth within week 1.   Blood Culture:   09/22: NO GROWTH AT 5 DAYS   Lab Results   Component Value Date    CREATININE 0.6 09/27/2022    BUN 7 09/25/2022     09/25/2022    K 3.9 09/25/2022     09/25/2022    CO2 25 09/25/2022     Hepatic Function Panel:   Lab Results   Component Value Date/Time    ALKPHOS 114 09/21/2022 09:04 PM    ALT 13 09/21/2022 09:04 PM    AST 23 09/21/2022 09:04 PM    PROT 6.5 09/21/2022 09:04 PM    LABALBU 3.8 09/21/2022 09:04 PM     Microbiology:   No results for input(s): BC in the last 72 hours. No results for input(s): Swati Flaming in the last 72 hours. No results for input(s): LABURIN in the last 72 hours. Lab Results   Component Value Date/Time    BACTERIA RARE 09/19/2022 01:25 PM    WBCUA 34 09/19/2022 01:25 PM    RBCUA NONE SEEN 09/19/2022 01:25 PM     No results found for: URRFLXCULT  No results for input(s): WNDABS in the last 72 hours. No results for input(s): CULTRESP in the last 72 hours. ANTIBIOTICS:  Present:  Vancomycin- 09/19-26  Past:  Ceftriaxone- 09/19  Cefazolin- 09/20    -----------------------------------------------------------------------------------------------------  Vital Signs:  Visit Vitals  /80   Pulse 81   Temp 99.8 °F (37.7 °C) (Oral)   Resp 18   Ht 5' 4\" (1.626 m)   Wt 238 lb 1.6 oz (108 kg)   SpO2 95%   BMI 40.87 kg/m²       Physical Exam:  Gen: alert and oriented X3, Pleasant and interactive, ambulating in room. Patient appears in no distress  Skin: Scattered Ecchymosis patches on arms and legs with ecchymotic patch continue to decreased in size since previous exams noted on anterior/medial left thigh. Remainder of skin pink, warm, dry,no erythema, no induration, no rashes, no stigmata of endocarditis. Wounds: C/I, dried on skin scant serosanguineous drainage   HEMT: AT/NC Oropharynx pink, moist, and without lesions or exudates; dentition in good state of repair  Eyes: PERRLA, EOMI, conjunctiva pink, sclera anicteric. Neck: Supple. Trachea midline. No LAD.   Chest: no distress and CTA. Good air movement. Heart: RRR and no MRG. Abd: soft, non-distended, no tenderness, no hepatomegaly. Normoactive bowel sounds. MSK: Positive for arthralgias, joint swelling and left thigh myalgia. Negative for back pain, gait problem, neck pain and neck stiffness. Ext: no clubbing, cyanosis, or edema  Catheter Site: Mediport upper left chest remains without erythema or tenderness. Diagnostic Studies: All pertinent images/lab and microbiology reports and were reviewed as a part of this hospitalization. 09/19/22: CT abdomen pelvis with contrast:  Impression   1. Status post Jet-en-Y gastric bypass. Gastric pouch is mildly distended   with ingested contents but is otherwise nonspecific. This appears slightly   more distended when compared to recent CTs and could be related to recent   meal. The esophagus and Jet limb is normal in caliber. 2. Stable pneumobilia. 3. No other acute abdominal or pelvic abnormality. 4. Punctate nonobstructing left renal calculus. 09/19/22: Duplex Venous Ultrasound of the Left Lower Extremity. Impression   1. Nonocclusive thrombus in the left distal femoral and popliteal veins. 2. Anechoic suprapatellar fluid collection may represent joint effusion or   fluid within the prepatellar bursa. Findings were discussed with CHARLIE Yun at 4:34 pm on 9/19/2022.     09/19/22 X-Ray Left Knee:  Impression   No acute osseous abnormality of the left knee. 09/25/22 Chest X ray Portable:  Impression   No acute process. Cardiomegaly. 09/27/22: X-Ray Left Knee Four Views  Impression   No acute abnormality. Mild osteoarthritis         I have examined this patient and available medical records on this date and have made the above observations, conclusions and recommendations.   Electronically signed by: Electronically signed by Lele Pate PA-C on 9/28/2022 at 4:54 PM

## 2022-09-28 NOTE — PROGRESS NOTES
Luis Nelson (1970)    Daily Progress Note-  Georgianne Peabody, DO,                  Today's Date:   9/28/2022          Subjective:     Patient seen and examined just woke up from sleeping. Much better pain control today than she was yesterday. She states that last night she did get some sleep and pain was improved status post knee aspiration. No new injuries. She denies any constitutional symptoms or any additional orthopedic complaints this time. Currently in compressive dressing without issue. Objective:     Vitals:    09/28/22 0411   BP: (!) 148/82   Pulse: 76   Resp: 20   Temp: 98.2 °F (36.8 °C)   SpO2: 94%        Review of Systems   Constitutional:  Positive for activity change. Negative for fatigue and fever. HENT:  Negative for sneezing, sore throat and voice change. Respiratory:  Negative for cough, shortness of breath and wheezing. Cardiovascular:  Negative for leg swelling. Gastrointestinal:  Negative for nausea and vomiting. Musculoskeletal:  Positive for arthralgias, joint swelling and myalgias. Negative for back pain, gait problem, neck pain and neck stiffness. Skin:  Negative for color change, rash and wound. Neurological:  Negative for weakness and numbness. Psychiatric/Behavioral:  Negative for behavioral problems, confusion and self-injury. Physical Exam:     The patient is awake and alert  Resting comfortably in bed    Operative extremity:    Dressing clean, dry, intact with compression Ace wrap over top no sign of bleeding from the medial or lateral portal  Mild, improved effusion that is palpable with improved warmth and continued no erythema. Mildly tender to palpation no crepitance with gentle range of motion  Sensation and motor function intact distally.  Patient wiggles toes without difficulty  Patient does have improved bruising on posterior thigh but none at the anterior thigh   no pain in joint above or below  Guarded range of motion of knee at this time, improved from yesterday. Continue pain with active and passive flexion but active motion is intact  Compartments soft, compressible  Positive Petar's which is unchanged from yesterday  Knee active range of motion intact. Patient is able to perform straight leg raise with no significant lag      LABS   CBC:   Recent Labs     09/25/22  2300 09/26/22  1215 09/27/22  0550   WBC  --   --  4.9   HGB 8.3* 8.6* 8.2*   PLT  --   --  234       Aspiration performed 9- demonstrates:  Unable to obtain RBC and WBC count due to clotting. Neutrophils 89%. Continued no crystals seen. No growth on cultures    Preoperative right knee aspiration results - No change    Culture-aerobic and anaerobic demonstrate:   Prelim Report No growth to date No growth 36 to 48 hours No Aerobic or Anaerobic growth Holding for 3 weeks. Anaerobic culture further report to follow No anaerobes isolated so far, Further report to follow        Awaiting Gram stain    Crystals: NO CRYSTALS SEEN     Intraoperative fluid collection demonstrates:  Prelim Report No growth to date No growth 36 to 48 hours P with either collection    ESR, CRP improved but remains elevated (19.7 9/27 compared to 138.7 on 9/24/2022)    IMAGING   No new orthopedic imaging    6 views of left knee demonstrate:     No joint effusion. No acute fracture. Small tricompartmental osteophytes   with mild medial compartment joint space loss. Remaining bones and joint   spaces are maintained. Assessment and Plan     1. POD #8 s/p left knee arthroscopic irrigation and debridement    1:  Physical therapy consult for mobilization   -WBAT   -No precautions with the exception of no deep knee bending and no soaking of the wound. 2:  DVT prophylaxis   -Continue DVT prophylaxis per hospitalist  3:  Continue Pain Control   -wean to PO meds  4.   Medical management per hospitalist service   -Continue to monitor Hgb.     -Continue antibiotic treatment per their plan -currently on vancomycin   -No recommendations at this time from myself about specific antibiotics   -Per recent infectious disease note there is no plan for outpatient antibiotics. I will defer this decision to their expertise but I have low threshold for patient to be given antibiotics as an outpatient but again I will defer to them. I feel that the lack of growth on cultures is possibly due to the amount of antibiotics she had received before aspiration was performed  5:  D/C Planning:     -Per case management  6. Pain better controlled this morning  7. Please maintain compressive dressing. Please continue ice on the knee as well to help with swelling. I spoke to both the nurse and patient about this  8. Awaiting CRP results collected yesterday   10. Currently no further orthopedic intervention plan at this time. No concern for septic effusion. Patient no longer needs to be n.p.o. at this time. No plan for suture removal for another week. If there becomes any concerning findings please contact me.          Coy De Los Santose, DO

## 2022-09-28 NOTE — PROGRESS NOTES
HEMATOLOGY & ONCOLOGY progress note  [facility]      Patient was seen and examined today. Pain is improved. Nausea somewhat improved. Has ongoing knee pain. Left knee aspiration 2/27 with 15 ml bloody fluid. Blood secondary to use of AC.     PHYSICAL EXAM    Vitals: BP (!) 143/82   Pulse 68   Temp 97.4 °F (36.3 °C) (Oral)   Resp 18   Ht 5' 4\" (1.626 m)   Wt 238 lb 1.6 oz (108 kg)   SpO2 94%   BMI 40.87 kg/m²   General appearance: alert, appears stated age and cooperative. Skin: Skin color, texture, turgor normal. No rashes or lesions  HEENT: Head: Normocephalic, no lesions, without obvious abnormality. Neck: no adenopathy  Lungs: clear to auscultation bilaterally  Heart: regular rate and rhythm, S1, S2 normal, no murmur, click, rub or gallop  Abdomen: soft, non-tender; bowel sounds normal; no masses,  no organomegaly  Extremities: wrap in place to left knee  Neurologic: Mental status: Alert, oriented, thought content appropriate    LABORATORY RESULTS  CBC:   Recent Labs     09/25/22  2300 09/26/22  1215 09/27/22  0550   WBC  --   --  4.9   HGB 8.3* 8.6* 8.2*   PLT  --   --  234     BMP:    Recent Labs     09/27/22  0550   CREATININE 0.6       ASSESSMENT/RECOMMENDATION    ? Concern for coagulopathy- No present bleeding, no history of bleeding with surgery/dental procedures. PT/PTT/INR were normal. Do not suspect she has bleeding disorder based on history and present labs. Hemarthrosis - usually hemarthrosis is caused by clotting factor deficiency then platelet disorder. We don't think she has clotting factor deficiency since she has normal PT/PTT/INR. We also don't think she has platelet disorder since her bleeding history is not consistent with it. However, we will plan to order platelet function assay an outpatient since it is not available inpatient. Left knee- aspiration again 9/27/22 with 15 ml dark blood from knee, [facility].  To follow up with orthopedics as outpatient. No concern for septic effusion. DVT- left distal femoral and popliteal veins, first episode. On lovenox. Hx of Lupus. Rule out APS. We will continue to follow the patient. Thank you.

## 2022-09-28 NOTE — PROGRESS NOTES
V2.0  [facility] [place] Hospitalist Progress Note      Name:  Tiara Mueller /Age/Sex: 1970  (46 y.o. female)   MRN & CSN:  [id number] & [id number] Encounter Date/Time: 2022 6:22 PM EDT    Location:  2665/0940-Y PCP: No primary care provider on file. Hospital Day: 10    Assessment and Plan:   Tiara Mueller is a 46 y.o. female with pmh of  who presents with Acute bilateral deep vein thrombosis (DVT) of femoral veins ([address]      Plan:  Patient left knee effusion: Concerning for septic joint status post I&D . Ortho is following with the patient. Initial arthrocentesis was negative for any crystals. There was elevated WBC on synovial fluid with high neutrophil count concerning for inflammatory versus septic arthritis. Patient has underlying history of lupus but is not on any medications. Drain was initially placed on that was removed on . Swab cultures from the left knee was negative. Synovial culture was negative. Active oozing of blood from the joint along with worsening swelling s/p repeat arthrocentesis today with significant relief in symptoms. Not on antibiotics anymore as per infectious disease. Possible hemarthrosis. Hematology oncology consult in place. Extensive work-up pending. Suspected delayed wound healing with underlying poor oral intake, recent gastric bypass history. Ortho repeated fluid draiange that was mostly clotted blood. Hemarthrosis: hem onc on board, they believe pt does not have bleeding disorder of any sort but still they will get PLT function assay. Agree with the plan  Intractable nausea and vomiting, improving. CT abdominal pelvis showed stable pneumobilia. On scopolamine patch  Acute nonocclusive DVT of the left distal femoral and popliteal vein on therapeutic Lovenox. Hematology oncology comes following up the patient  Acute hypochromic microcytic anemia, likely from blood loss. Was on anticoagulation that is on hold. Transfuse 1 pack of RBC. Hemoglobin is 8.2 today. Hemoccult was positive. Osteoarthritis with underlying history of lupus: Was started on prednisone and she feels it is helping  History of recent bariatric surgery and delayed wound healing: Keeping in mind the poor nutrition status along with history of psoriasis, lupus we will order a bariatric panel including zinc and copper levels. We will also order a TSH level. Diet ADULT DIET; Regular   DVT Prophylaxis [x] Lovenox, []  Heparin, [] SCDs, [] Ambulation,  [] Eliquis, [] Xarelto  [] Coumadin   Code Status Full Code   Disposition From: Home  Expected Disposition: Home  Estimated Date of Discharge: 2 days  Patient requires continued admission due to Npo and stress test tomorrow   Surrogate Decision Maker/ POA      Subjective:     Chief Complaint: No chief complaint on file. The patient was seen at the bedside. Still in pain. Case discussed with her and ortho in detail. Wound will need a lot of time to heal it seems we will provide apporpriate care. Review of Systems:    Review of Systems   Constitutional:  Positive for appetite change and unexpected weight change. Negative for chills and fever. HENT:  Negative for ear pain, hearing loss, sinus pressure, sinus pain and voice change. Eyes:  Negative for pain, discharge and visual disturbance. Respiratory:  Positive for cough. Negative for chest tightness and shortness of breath. Cardiovascular:  Positive for leg swelling. Negative for chest pain and palpitations. Gastrointestinal:  Negative for abdominal pain, blood in stool, diarrhea, nausea and vomiting. Genitourinary:  Negative for dysuria and frequency. Musculoskeletal:  Positive for arthralgias, back pain, gait problem and joint swelling. Skin:  Positive for color change, pallor, rash and wound. Neurological:  Positive for weakness and light-headedness. Negative for dizziness and headaches. Psychiatric/Behavioral:  Positive for sleep disturbance. Objective: Intake/Output Summary (Last 24 hours) at 9/28/2022 1523  Last data filed at 9/27/2022 2230  Gross per 24 hour   Intake 60 ml   Output --   Net 60 ml          Vitals:   Vitals:    09/28/22 1345   BP: 137/80   Pulse: 81   Resp: 18   Temp: 99.8 °F (37.7 °C)   SpO2: 95%       Physical Exam:   Physical Exam  Vitals and nursing note reviewed. Constitutional:       Appearance: Normal appearance. She is normal weight. HENT:      Head: Normocephalic. Nose: Nose normal.      Mouth/Throat:      Mouth: Mucous membranes are moist.   Eyes:      Conjunctiva/sclera: Conjunctivae normal.      Pupils: Pupils are equal, round, and reactive to light. Cardiovascular:      Rate and Rhythm: Normal rate and regular rhythm. Pulses: Normal pulses. Heart sounds: Normal heart sounds. No murmur heard. Pulmonary:      Effort: Pulmonary effort is normal.      Breath sounds: Normal breath sounds. No wheezing, rhonchi or rales. Abdominal:      General: Abdomen is flat. Bowel sounds are normal. There is no distension. Palpations: Abdomen is soft. Tenderness: There is no abdominal tenderness. Musculoskeletal:         General: Swelling, tenderness and signs of injury present. No deformity. Normal range of motion. Cervical back: Normal range of motion and neck supple. Right lower leg: No edema. Left lower leg: Edema present. Skin:     Coloration: Skin is not jaundiced or pale. Findings: Bruising, erythema and rash present. Neurological:      General: No focal deficit present. Mental Status: She is alert and oriented to person, place, and time. Mental status is at baseline.    Psychiatric:         Mood and Affect: Mood normal.          Medications:   Medications:    scopolamine  1 patch TransDERmal Q72H    predniSONE  40 mg Oral Daily    gabapentin  800 mg Oral TID    Vitamin D  1,000 Units Oral Daily    vitamin B-12  500 mcg Oral Daily    miconazole   Topical BID  sodium chloride flush  5-40 mL IntraVENous 2 times per day    enoxaparin  1 mg/kg SubCUTAneous BID    ondansetron  4 mg IntraVENous 4 times per day    PARoxetine  30 mg Oral Daily    hydrOXYzine HCl  50 mg Oral BID    levothyroxine  125 mcg Oral Daily    levETIRAcetam  1,000 mg Oral BID    pantoprazole  40 mg Oral BID AC      Infusions:    sodium chloride      sodium chloride       PRN Meds: sodium chloride, , PRN  tiZANidine, 4 mg, Q8H PRN  sodium chloride flush, 5-40 mL, PRN  sodium chloride, , PRN  oxyCODONE, 5 mg, Q4H PRN   Or  oxyCODONE, 10 mg, Q4H PRN  HYDROmorphone, 0.25 mg, Q3H PRN   Or  HYDROmorphone, 0.5 mg, Q3H PRN  ondansetron, 4 mg, Q8H PRN   Or  ondansetron, 4 mg, Q6H PRN  polyethylene glycol, 17 g, Daily PRN  acetaminophen, 650 mg, Q6H PRN   Or  acetaminophen, 650 mg, Q6H PRN  promethazine, 25 mg, Q6H PRN      Labs      Recent Results (from the past 24 hour(s))   Vancomycin Level, Random    Collection Time: 09/28/22  4:35 AM   Result Value Ref Range    Vancomycin Rm 4.4 UG/ML    DOSE AMOUNT DOSE AMT. GIVEN - 1250mg     DOSE TIME DOSE TIME GIVEN - 1100         Imaging/Diagnostics Last 24 Hours   XR KNEE LEFT (MIN 4 VIEWS)    Result Date: 9/27/2022  EXAMINATION: FOUR XRAY VIEWS OF THE LEFT KNEE 9/27/2022 11:40 am COMPARISON: None. HISTORY: ORDERING SYSTEM PROVIDED HISTORY: complaints of pain TECHNOLOGIST PROVIDED HISTORY: Reason for exam:->complaints of pain Reason for Exam: complaints of pain FINDINGS: No joint effusion. No acute fracture. Small tricompartmental osteophytes with mild medial compartment joint space loss. Remaining bones and joint spaces are maintained. No acute abnormality. Mild osteoarthritis     XR CHEST PORTABLE    Result Date: 9/25/2022  EXAMINATION: ONE XRAY VIEW OF THE CHEST 9/25/2022 6:27 pm COMPARISON: None.  HISTORY: ORDERING SYSTEM PROVIDED HISTORY: chest discomfort TECHNOLOGIST PROVIDED HISTORY: Reason for exam:->chest discomfort Reason for Exam: chest discomfort FINDINGS: Left chest wall Port-A-Cath line with tip in the SVC. Mild cardiomegaly. The lungs are without acute focal process. There is no effusion or pneumothorax. The cardiomediastinal silhouette is without acute process. The osseous structures are without acute process. No acute process. Cardiomegaly.      Electronically signed by Iqra Abdul MD, MD on 9/28/2022 at 3:23 PM

## 2022-09-28 NOTE — CARE COORDINATION
Chart reviewed. Plan remains home with [id number]. No home IV antibx as of this time. CM will continue to follow in case plan changes. [Partial Nail Avulsion] : partial nail avulsion on [1] : toe 1 [Right Foot] : was performed on the right foot [Patient] : the patient [Ethyl Chloride] : ethyl chloride [___ ml Inj] : [unfilled] ~Uml [2%] : 2% [Betadine] : betadine solution [27 gauge] : A 27 gauge needle was used [Tolerated Well] : tolerated the procedure well [No Complications] : There were no complications. [Instructions Given] : given handouts/patient instructions [Patient Instructed to Call] : instructed to call if redness at site, a decrease in range of motion or an increase in pain is noted after procedure.

## 2022-09-28 NOTE — PROGRESS NOTES
Occupational Therapy  Attempted to see pt on this date for OT session. Pt declined despite education on benefit of movement. Pt stated she requested to sleep longer. Will attempt as able and appropriate.   José DOWNING/KEKE

## 2022-09-28 NOTE — PROGRESS NOTES
Occupational Therapy    Occupational Therapy Treatment Note    Name: Sanjeev Reyes MRN: [id number] :   1970   Date:  2022   Admission Date: 2022 Room:  [address] [place] [address]-A     Primary Problem:  The primary encounter diagnosis was Acute deep vein thrombosis (DVT) of popliteal vein of left lower extremity ([facility] [address] Diagnoses of Non-intractable vomiting with nausea, unspecified vomiting type, Generalized abdominal pain, and Injury of left knee, subsequent encounter were also pertinent to this visit. Restrictions/Precautions: general, fall, tele, IV, L knee debridement   No one at bedside    Communication with other providers: Co tx with PT vera for activity tolerance. Subjective:  Patient states:  Pt agreeable to therapy session. Pain:   Location, Type, Intensity (0/10 to 10/10): Denies pain    Objective:    Observation: Pt seated in chair upon arrival  Objective Measures:  Pt alert and oriented. Treatment, including education:  Self Care Training:   Cues were given for safety, sequence, UE/LE placement, visual cues, and balance. Pt seated in chair and completed sit to stand from armed chair with gait belt and WW and completed sit  with CGA and completed functional mobility to bathroom and completed transfer to toilet with set up and SBA with use of grab bars. Pt completed pericare with SBA. Pt completed sit to stand and transfer to sink with SBA with decreased safety awareness. Therapeutic Activity Training:   Therapeutic activity training was instructed today. Cues were given for safety, sequence, UE/LE placement, awareness, and balance. Activities performed today included bed mobility training, sup-sit, sit-stand, SPT. Pt completed functional mobility less than household distance with WW and gait belt with CGA to SBA with verbal cues to push walker and not lift it up.  Pt demonstrated fair - safety awareness and returned in room and completed standing balance without walker to completed chair linen change with supervision. Pt left seated inc hair with all needs met and call light in reach. Assessment / Impression:    Patient's tolerance of treatment: fair+  Adverse Reaction: None  Significant change in status and impact: Improved from previous session  Barriers to improvement: None      Plan for Next Session:    Continue OT POC and progress LE ADLs.     Time in:  1425  Time out:  1449  Timed treatment minutes:  24  Total treatment time:  24      Electronically signed by:    FADY Guthrie,   9/28/2022, 2:54 PM

## 2022-09-28 NOTE — PROGRESS NOTES
Infectious Disease Progress Note  2022   Patient Name: Veronique Blum : 1970     Assessment  Left knee swelling:Continues to have pain in the left knee, however CRP is reduced. Synovial fluid culture remain negative. Acute DVT in left distal femoral and popliteal veins  Obesity BMI  40 .85 kg/m2      Plan  Therapeutic: off abx  Diagnostic: trend CRP  F/u:  Other: Continue to monitor off antibiotics. Follow-up for left knee swelling. Reports pain and swelling in left knee     Physical Exam:  Vital Signs: BP (!) 173/98 Comment: RN Pauly Campbell notified  Pulse 67   Temp 98.2 °F (36.8 °C) (Oral)   Resp 17   Ht 5' 4\" (1.626 m)   Wt 238 lb (108 kg)   SpO2 97%   BMI 40.85 kg/m²        Reason for visit: F/u left knee swelling  History:? Interval history noted  Denies n/v/d/f or untoward effects of antimicrobials    Physical Exam:  Vital Signs: /80   Pulse 81   Temp 99.8 °F (37.7 °C) (Oral)   Resp 19   Ht 5' 4\" (1.626 m)   Wt 238 lb 1.6 oz (108 kg)   SpO2 95%   BMI 40.87 kg/m²     Gen: alert and oriented X3, no distress  Skin: no stigmata of endocarditis  Wounds: Left knee dressing C/D/I  Chest: no distress and CTA. Good air movement. Heart: RRR and no MRG. Abd: soft, non-distended, no tenderness, no hepatomegaly. Normoactive bowel sounds. Radiologic / Imaging / TESTING  No results found. Labs:    Recent Results (from the past 24 hour(s))   Vancomycin Level, Random    Collection Time: 22  4:35 AM   Result Value Ref Range    Vancomycin Rm 4.4 UG/ML    DOSE AMOUNT DOSE AMT.  GIVEN - 1250mg     DOSE TIME DOSE TIME GIVEN - 1100      CULTURE results: Invalid input(s): BLOOD CULTURE,  URINE CULTURE, SURGICAL CULTURE    Diagnosis:  Patient Active Problem List   Diagnosis    Acute bilateral deep vein thrombosis (DVT) of femoral veins (HCC)    Acute deep vein thrombosis (DVT) of popliteal vein of left lower extremity (HCC)    Swelling of joint of left knee       Active Problems  Principal Problem:    Acute bilateral deep vein thrombosis (DVT) of femoral veins (HCC)  Active Problems:    Acute deep vein thrombosis (DVT) of popliteal vein of left lower extremity (HCC)    Swelling of joint of left knee  Resolved Problems:    * No resolved hospital problems.  *              Electronically signed by: Electronically signed by Marium Donovan MD on 9/28/2022 at 6:35 PM

## 2022-09-28 NOTE — PROGRESS NOTES
Physical Therapy    Physical Therapy Treatment Note  Name: Tamela Lucio MRN: [id number] :   1970   Date:  2022   Admission Date: 2022 Room:  [address] [place] [address]   Restrictions/Precautions:  fall risk; general precautions; LLE WBAT, okay to ROM  Communication with other providers:  Marelyx w/ Floyd Asp  Subjective:  Patient states: \"They told me I have to get rest, that I'm offsetting the healing\"  Pain:   Location, Type, Intensity (0/10 to 10/10):  endorses L knee pain that she does not rate  Objective:    Observation:  Seated in recliner, awake, alert, agreeable. She is guarded in pain and intermittently stalling t/o session and prior to am att. Treatment, including education/measures:  Therapeutic Activity Training:   Therapeutic activity training was instructed today. Cues were given for safety, sequence, UE/LE placement, awareness, and balance. Activities performed today included bed mobility training, sup-sit, sit-stand, SPT. Sit <-> stand: SBA  Standing balance: fair +  Stand <-> sit: SBA  Toilet transfer: SBA   Positioned for comfort and pressure relief ; all needs met, left in chair, call light in reach, gait belt for [address] time and effort to attend to toileting and pt care needs this session     Gait Training:  Cues were given for safety, sequence, device management, balance, posture, awareness, path. 125 ft using FWW at SBA ; dec viviana, reciprocal patterning, inc UE reliance during L LE WB ; grossly stable and consistent, guarded w/ L LE movement    Assessment / Impression:    Pt tolerating inc distance well, limited by pain and apprehension. Will cont to follow.     Patient's tolerance of treatment:  fair +  Barriers to improvement:  pain; self-limiting; endurance  Plan for Next Session:    Cont w/ est DC plan    Time in:  1421  Time out:  1447  Timed treatment minutes:  23 (TA, GT)  Total treatment time:  26    Previously filed items:  Social/Functional History  Lives With: Family (brothers following surgery)  Type of Home: House  Home Layout: One level  Home Access: Stairs to enter with rails  Entrance Stairs - Number of Steps: 2  Entrance Stairs - Rails: Left  Bathroom Shower/Tub: Tub/Shower unit  Bathroom Toilet: Standard  Home Equipment: Crutches  Has the patient had two or more falls in the past year or any fall with injury in the past year?: No  Receives Help From: Family  ADL Assistance: Independent  Homemaking Assistance: Independent  Homemaking Responsibilities: Yes  Ambulation Assistance: Independent  Transfer Assistance: Independent  Active : Yes  Occupation: On disability  Patient Goals   Patient goals : return to [address] [place] [address]  Time Frame for Short term goals: 1 week  Short term goal 1: pt will complete bed mobility at mod ind  Short term goal 2: pt will complete transfers at sup level  Short term goal 3: pt will ambulate 100 ft using LRAD at sup level    Electronically signed by:    Maryse Hayden PT, DPT  9/28/2022, 4:05 PM

## 2022-09-29 PROCEDURE — 6360000002 HC RX W HCPCS: Performed by: STUDENT IN AN ORGANIZED HEALTH CARE EDUCATION/TRAINING PROGRAM

## 2022-09-29 PROCEDURE — 86141 C-REACTIVE PROTEIN HS: CPT

## 2022-09-29 PROCEDURE — 99232 SBSQ HOSP IP/OBS MODERATE 35: CPT | Performed by: INTERNAL MEDICINE

## 2022-09-29 PROCEDURE — 97116 GAIT TRAINING THERAPY: CPT

## 2022-09-29 PROCEDURE — 2500000003 HC RX 250 WO HCPCS: Performed by: HOSPITALIST

## 2022-09-29 PROCEDURE — 6370000000 HC RX 637 (ALT 250 FOR IP): Performed by: HOSPITALIST

## 2022-09-29 PROCEDURE — 2580000003 HC RX 258: Performed by: STUDENT IN AN ORGANIZED HEALTH CARE EDUCATION/TRAINING PROGRAM

## 2022-09-29 PROCEDURE — 94761 N-INVAS EAR/PLS OXIMETRY MLT: CPT

## 2022-09-29 PROCEDURE — 6370000000 HC RX 637 (ALT 250 FOR IP): Performed by: STUDENT IN AN ORGANIZED HEALTH CARE EDUCATION/TRAINING PROGRAM

## 2022-09-29 PROCEDURE — 1200000000 HC SEMI PRIVATE

## 2022-09-29 RX ADMIN — PROMETHAZINE HYDROCHLORIDE 25 MG: 25 INJECTION INTRAMUSCULAR; INTRAVENOUS at 17:26

## 2022-09-29 RX ADMIN — MICONAZOLE NITRATE: 2 POWDER TOPICAL at 20:29

## 2022-09-29 RX ADMIN — HYDROMORPHONE HYDROCHLORIDE 0.5 MG: 1 INJECTION, SOLUTION INTRAMUSCULAR; INTRAVENOUS; SUBCUTANEOUS at 20:27

## 2022-09-29 RX ADMIN — SODIUM CHLORIDE, PRESERVATIVE FREE 10 ML: 5 INJECTION INTRAVENOUS at 10:32

## 2022-09-29 RX ADMIN — ENOXAPARIN SODIUM 105 MG: 150 INJECTION SUBCUTANEOUS at 20:27

## 2022-09-29 RX ADMIN — SODIUM CHLORIDE, PRESERVATIVE FREE 10 ML: 5 INJECTION INTRAVENOUS at 20:28

## 2022-09-29 RX ADMIN — HYDROMORPHONE HYDROCHLORIDE 0.5 MG: 1 INJECTION, SOLUTION INTRAMUSCULAR; INTRAVENOUS; SUBCUTANEOUS at 16:45

## 2022-09-29 RX ADMIN — ONDANSETRON 4 MG: 2 INJECTION INTRAMUSCULAR; INTRAVENOUS at 00:48

## 2022-09-29 RX ADMIN — HYDROMORPHONE HYDROCHLORIDE 0.5 MG: 1 INJECTION, SOLUTION INTRAMUSCULAR; INTRAVENOUS; SUBCUTANEOUS at 13:37

## 2022-09-29 RX ADMIN — PAROXETINE HYDROCHLORIDE 30 MG: 20 TABLET, FILM COATED ORAL at 10:30

## 2022-09-29 RX ADMIN — GABAPENTIN 800 MG: 400 CAPSULE ORAL at 20:23

## 2022-09-29 RX ADMIN — LEVETIRACETAM 1000 MG: 500 TABLET, FILM COATED ORAL at 10:30

## 2022-09-29 RX ADMIN — OXYCODONE HYDROCHLORIDE 10 MG: 10 TABLET ORAL at 22:32

## 2022-09-29 RX ADMIN — HYDROMORPHONE HYDROCHLORIDE 0.5 MG: 1 INJECTION, SOLUTION INTRAMUSCULAR; INTRAVENOUS; SUBCUTANEOUS at 01:31

## 2022-09-29 RX ADMIN — ONDANSETRON 4 MG: 2 INJECTION INTRAMUSCULAR; INTRAVENOUS at 05:46

## 2022-09-29 RX ADMIN — PANTOPRAZOLE SODIUM 40 MG: 40 TABLET, DELAYED RELEASE ORAL at 16:45

## 2022-09-29 RX ADMIN — HYDROMORPHONE HYDROCHLORIDE 0.5 MG: 1 INJECTION, SOLUTION INTRAMUSCULAR; INTRAVENOUS; SUBCUTANEOUS at 10:31

## 2022-09-29 RX ADMIN — Medication 1000 UNITS: at 10:31

## 2022-09-29 RX ADMIN — MICONAZOLE NITRATE: 2 POWDER TOPICAL at 10:32

## 2022-09-29 RX ADMIN — HYDROXYZINE HYDROCHLORIDE 50 MG: 50 TABLET, FILM COATED ORAL at 20:22

## 2022-09-29 RX ADMIN — TIZANIDINE 4 MG: 4 TABLET ORAL at 15:06

## 2022-09-29 RX ADMIN — HYDROXYZINE HYDROCHLORIDE 50 MG: 50 TABLET, FILM COATED ORAL at 10:31

## 2022-09-29 RX ADMIN — LEVOTHYROXINE SODIUM 125 MCG: 25 TABLET ORAL at 05:46

## 2022-09-29 RX ADMIN — ENOXAPARIN SODIUM 105 MG: 150 INJECTION SUBCUTANEOUS at 10:32

## 2022-09-29 RX ADMIN — ONDANSETRON 4 MG: 2 INJECTION INTRAMUSCULAR; INTRAVENOUS at 16:45

## 2022-09-29 RX ADMIN — HYDROMORPHONE HYDROCHLORIDE 0.5 MG: 1 INJECTION, SOLUTION INTRAMUSCULAR; INTRAVENOUS; SUBCUTANEOUS at 04:43

## 2022-09-29 RX ADMIN — TIZANIDINE 4 MG: 4 TABLET ORAL at 00:55

## 2022-09-29 RX ADMIN — CYANOCOBALAMIN TAB 1000 MCG 500 MCG: 1000 TAB at 10:30

## 2022-09-29 RX ADMIN — ONDANSETRON 4 MG: 2 INJECTION INTRAMUSCULAR; INTRAVENOUS at 10:31

## 2022-09-29 RX ADMIN — PREDNISONE 40 MG: 20 TABLET ORAL at 10:31

## 2022-09-29 RX ADMIN — LEVETIRACETAM 1000 MG: 500 TABLET, FILM COATED ORAL at 20:23

## 2022-09-29 RX ADMIN — OXYCODONE HYDROCHLORIDE 10 MG: 10 TABLET ORAL at 00:49

## 2022-09-29 RX ADMIN — PANTOPRAZOLE SODIUM 40 MG: 40 TABLET, DELAYED RELEASE ORAL at 05:46

## 2022-09-29 RX ADMIN — GABAPENTIN 800 MG: 400 CAPSULE ORAL at 10:30

## 2022-09-29 RX ADMIN — GABAPENTIN 800 MG: 400 CAPSULE ORAL at 13:37

## 2022-09-29 RX ADMIN — OXYCODONE HYDROCHLORIDE 10 MG: 10 TABLET ORAL at 05:47

## 2022-09-29 RX ADMIN — SODIUM CHLORIDE, PRESERVATIVE FREE 10 ML: 5 INJECTION INTRAVENOUS at 13:38

## 2022-09-29 ASSESSMENT — ENCOUNTER SYMPTOMS
VOMITING: 0
BLOOD IN STOOL: 0
BACK PAIN: 1
DIARRHEA: 0
CHEST TIGHTNESS: 0
VOICE CHANGE: 0
COLOR CHANGE: 1
EYE PAIN: 0
ABDOMINAL PAIN: 0
SINUS PAIN: 0
SHORTNESS OF BREATH: 0
COUGH: 1
SINUS PRESSURE: 0
EYE DISCHARGE: 0
NAUSEA: 0

## 2022-09-29 ASSESSMENT — PAIN SCALES - GENERAL
PAINLEVEL_OUTOF10: 8
PAINLEVEL_OUTOF10: 8
PAINLEVEL_OUTOF10: 7
PAINLEVEL_OUTOF10: 7
PAINLEVEL_OUTOF10: 10
PAINLEVEL_OUTOF10: 8
PAINLEVEL_OUTOF10: 8
PAINLEVEL_OUTOF10: 9
PAINLEVEL_OUTOF10: 6
PAINLEVEL_OUTOF10: 8

## 2022-09-29 ASSESSMENT — PAIN DESCRIPTION - LOCATION
LOCATION: LEG
LOCATION: LEG
LOCATION: KNEE
LOCATION: KNEE;LEG
LOCATION: LEG
LOCATION: KNEE;LEG
LOCATION: LEG
LOCATION: KNEE
LOCATION: LEG
LOCATION: KNEE;LEG

## 2022-09-29 ASSESSMENT — PAIN DESCRIPTION - ORIENTATION
ORIENTATION: LEFT

## 2022-09-29 ASSESSMENT — PAIN - FUNCTIONAL ASSESSMENT
PAIN_FUNCTIONAL_ASSESSMENT: PREVENTS OR INTERFERES WITH MANY ACTIVE NOT PASSIVE ACTIVITIES
PAIN_FUNCTIONAL_ASSESSMENT: PREVENTS OR INTERFERES WITH MANY ACTIVE NOT PASSIVE ACTIVITIES
PAIN_FUNCTIONAL_ASSESSMENT: PREVENTS OR INTERFERES SOME ACTIVE ACTIVITIES AND ADLS
PAIN_FUNCTIONAL_ASSESSMENT: ACTIVITIES ARE NOT PREVENTED

## 2022-09-29 ASSESSMENT — PAIN DESCRIPTION - DESCRIPTORS
DESCRIPTORS: ACHING;THROBBING;SORE
DESCRIPTORS: ACHING;SHOOTING
DESCRIPTORS: ACHING;THROBBING
DESCRIPTORS: ACHING;PRESSURE
DESCRIPTORS: ACHING;SHOOTING;SHARP
DESCRIPTORS: ACHING
DESCRIPTORS: SHOOTING;SORE
DESCRIPTORS: ACHING
DESCRIPTORS: ACHING;DISCOMFORT

## 2022-09-29 NOTE — PLAN OF CARE
Problem: Discharge Planning  Goal: Discharge to home or other facility with appropriate resources  9/29/2022 0318 by Cate Goodwin RN  Outcome: Progressing  9/28/2022 1831 by Alecia Elias  Outcome: Progressing     Problem: Pain  Goal: Verbalizes/displays adequate comfort level or baseline comfort level  9/29/2022 0318 by Ctae Goodwin RN  Outcome: Progressing  9/28/2022 1831 by Alecia Elias  Outcome: Progressing     Problem: ABCDS Injury Assessment  Goal: Absence of physical injury  9/29/2022 0318 by Cate Goodwin RN  Outcome: Progressing  9/28/2022 1831 by Alecia Elias  Outcome: Progressing     Problem: Safety - Adult  Goal: Free from fall injury  9/29/2022 0318 by Cate Goodwin RN  Outcome: Progressing  9/28/2022 1831 by Alecia Elias  Outcome: Progressing

## 2022-09-29 NOTE — PROGRESS NOTES
Physical Therapy    Physical Therapy Treatment Note  Name: Katiana Koehler MRN: [id number] :   1970   Date:  2022   Admission Date: 2022 Room:  [address] [place] [address]   Restrictions/Precautions:  fall risk; general precautions  Subjective:  Patient states:  \"I've got this new bug now, I don't know how long they're gonna keep me now\"  Pain:   Location, Type, Intensity (0/10 to 10/10):  L knee and hip pain that she doesn't rate  Objective:    Observation:   Seated in recliner, awake, alert, agreeable. She is discouraged about lack of progress or etiology of L knee swelling and hip pain, cont to move well. Treatment, including education/measures:  Transfers: SBA  Standing balance: fair+   Assisted to and from BR ; ind w/ pt care    Gait Training:  Cues were given for safety, sequence, device management, balance, posture, awareness, path. 115 ft using FWW at SBA ; dec viviana, reciprocal patterning, inc UE reliance during L LE WB ; grossly stable and consistent, guarded w/ L LE movement    Assessment / Impression: Tolerating well, pain cont to limit tolerance and efficiency. Moving well, working around pain, intermittent unsteady in response to pain. Will cont to benefit from acute rehab to promote safe return home.      Patient's tolerance of treatment:  fair +  Barriers to improvement:  pain, comorbid conditions, endurance  Plan for Next Session:    Cont w/ est POC    Time in:  1423  Time out: 1440  Timed treatment minutes:  14 GT  Total treatment time:  16    Previously filed items:  Social/Functional History  Lives With: Family (brothers following surgery)  Type of Home: House  Home Layout: One level  Home Access: Stairs to enter with rails  Entrance Stairs - Number of Steps: 2  Entrance Stairs - Rails: Left  Bathroom Shower/Tub: Tub/Shower unit  Bathroom Toilet: Standard  Home Equipment: Crutches  Has the patient had two or more falls in the past year or any fall with injury in the past year?: No  Receives Help From: Family  ADL Assistance: Independent  Homemaking Assistance: Independent  Homemaking Responsibilities: Yes  Ambulation Assistance: Independent  Transfer Assistance: Independent  Active : Yes  Occupation: On disability  Patient Goals   Patient goals : return to [address] [place] [address]  Time Frame for Short term goals: 1 week  Short term goal 1: pt will complete bed mobility at mod ind  Short term goal 2: pt will complete transfers at sup level  Short term goal 3: pt will ambulate 100 ft using LRAD at sup level    Electronically signed by:    Carli Glaser PT, DPT  9/29/2022, 2:43 PM

## 2022-09-29 NOTE — PROGRESS NOTES
V2.0  [facility] [place]      Name:  Leah Bermudez /Age/Sex: 1970  (46 y.o. female)   MRN & CSN:  [id number] & [id number] Encounter Date/Time: 2022 6:22 PM EDT    Location:  7783/5219-N PCP: No primary care provider on file. Hospital Day: 11    Assessment and Plan:   Leah Bermudez is a 46 y.o. female with pmh of  who presents with Acute bilateral deep vein thrombosis (DVT) of femoral veins ([address]      Plan:  Patient left knee effusion: Concerning for septic joint status post I&D . Ortho is following with the patient. Initial arthrocentesis was negative for any crystals. There was elevated WBC on synovial fluid with high neutrophil count concerning for inflammatory versus septic arthritis. Patient has underlying history of lupus but is not on any medications. Drain was initially placed on that was removed on . Swab cultures from the left knee was negative. Synovial culture was negative. Active oozing of blood from the joint along with worsening swelling s/p repeat arthrocentesis today with significant relief in symptoms. Not on antibiotics anymore as per infectious disease. Possible hemarthrosis. Hematology oncology consult in place. Extensive work-up pending. Suspected delayed wound healing with underlying poor oral intake, recent gastric bypass history. Ortho repeated fluid draiange that was mostly clotted blood. Hemarthrosis: hem onc on board, they believe pt does not have bleeding disorder of any sort but still they will get PLT function assay. Agree with the plan  Intractable nausea and vomiting, improving. CT abdominal pelvis showed stable pneumobilia. On scopolamine patch  Acute nonocclusive DVT of the left distal femoral and popliteal vein on therapeutic Lovenox. Hematology oncology comes following up the patient  Acute hypochromic microcytic anemia, likely from blood loss. Was on anticoagulation that is on hold. Transfuse 1 pack of RBC. Hemoglobin is 8.2 today. Hemoccult was positive. Osteoarthritis with underlying history of lupus: Was started on prednisone and she feels it is helping  History of recent bariatric surgery and delayed wound healing: Keeping in mind the poor nutrition status along with history of psoriasis, lupus we will order a bariatric panel including zinc and copper levels. We will also order a TSH level. Diet ADULT DIET; Regular   DVT Prophylaxis [x] Lovenox, []  Heparin, [] SCDs, [] Ambulation,  [] Eliquis, [] Xarelto  [] Coumadin   Code Status Full Code   Disposition From: Home  Expected Disposition: Home  Estimated Date of Discharge: 2 days  Patient requires continued admission due to Npo and stress test tomorrow   Surrogate Decision Maker/ POA      Subjective:     Chief Complaint: No chief complaint on file. The patient was seen at the bedside. Still in pain. Case discussed with her and ortho in detail. Wound will need a lot of time to heal it seems we will provide apporpriate care. Still has pain and difficulty moving. Ortho following. Review of Systems:    Review of Systems   Constitutional:  Positive for appetite change and unexpected weight change. Negative for chills and fever. HENT:  Negative for ear pain, hearing loss, sinus pressure, sinus pain and voice change. Eyes:  Negative for pain, discharge and visual disturbance. Respiratory:  Positive for cough. Negative for chest tightness and shortness of breath. Cardiovascular:  Positive for leg swelling. Negative for chest pain and palpitations. Gastrointestinal:  Negative for abdominal pain, blood in stool, diarrhea, nausea and vomiting. Genitourinary:  Negative for dysuria and frequency. Musculoskeletal:  Positive for arthralgias, back pain, gait problem and joint swelling. Skin:  Positive for color change, pallor, rash and wound. Neurological:  Positive for weakness and light-headedness. Negative for dizziness and headaches. Psychiatric/Behavioral:  Positive for sleep disturbance. Objective:   No intake or output data in the 24 hours ending 09/29/22 0824       Vitals:   Vitals:    09/29/22 0131   BP: 124/70   Pulse:    Resp: 20   Temp: 98.2 °F (36.8 °C)   SpO2: 97%       Physical Exam:   Physical Exam  Vitals and nursing note reviewed. Constitutional:       Appearance: Normal appearance. She is normal weight. HENT:      Head: Normocephalic. Nose: Nose normal.      Mouth/Throat:      Mouth: Mucous membranes are moist.   Eyes:      Conjunctiva/sclera: Conjunctivae normal.      Pupils: Pupils are equal, round, and reactive to light. Cardiovascular:      Rate and Rhythm: Normal rate and regular rhythm. Pulses: Normal pulses. Heart sounds: Normal heart sounds. No murmur heard. Pulmonary:      Effort: Pulmonary effort is normal.      Breath sounds: Normal breath sounds. No wheezing, rhonchi or rales. Abdominal:      General: Abdomen is flat. Bowel sounds are normal. There is no distension. Palpations: Abdomen is soft. Tenderness: There is no abdominal tenderness. Musculoskeletal:         General: Swelling, tenderness and signs of injury present. No deformity. Normal range of motion. Cervical back: Normal range of motion and neck supple. Right lower leg: No edema. Left lower leg: Edema present. Skin:     Coloration: Skin is not jaundiced or pale. Findings: Bruising, erythema and rash present. Neurological:      General: No focal deficit present. Mental Status: She is alert and oriented to person, place, and time. Mental status is at baseline.    Psychiatric:         Mood and Affect: Mood normal.          Medications:   Medications:    scopolamine  1 patch TransDERmal Q72H    predniSONE  40 mg Oral Daily    gabapentin  800 mg Oral TID    Vitamin D  1,000 Units Oral Daily    vitamin B-12  500 mcg Oral Daily    miconazole   Topical BID    sodium chloride flush  5-40 mL IntraVENous 2 times per day    enoxaparin  1 mg/kg SubCUTAneous BID    ondansetron  4 mg IntraVENous 4 times per day    PARoxetine  30 mg Oral Daily    hydrOXYzine HCl  50 mg Oral BID    levothyroxine  125 mcg Oral Daily    levETIRAcetam  1,000 mg Oral BID    pantoprazole  40 mg Oral BID AC      Infusions:    sodium chloride      sodium chloride       PRN Meds: sodium chloride, , PRN  tiZANidine, 4 mg, Q8H PRN  sodium chloride flush, 5-40 mL, PRN  sodium chloride, , PRN  oxyCODONE, 5 mg, Q4H PRN   Or  oxyCODONE, 10 mg, Q4H PRN  HYDROmorphone, 0.25 mg, Q3H PRN   Or  HYDROmorphone, 0.5 mg, Q3H PRN  ondansetron, 4 mg, Q8H PRN   Or  ondansetron, 4 mg, Q6H PRN  polyethylene glycol, 17 g, Daily PRN  acetaminophen, 650 mg, Q6H PRN   Or  acetaminophen, 650 mg, Q6H PRN  promethazine, 25 mg, Q6H PRN      Labs      No results found for this or any previous visit (from the past 24 hour(s)). Imaging/Diagnostics Last 24 Hours   XR KNEE LEFT (MIN 4 VIEWS)    Result Date: 9/27/2022  EXAMINATION: FOUR XRAY VIEWS OF THE LEFT KNEE 9/27/2022 11:40 am COMPARISON: None. HISTORY: ORDERING SYSTEM PROVIDED HISTORY: complaints of pain TECHNOLOGIST PROVIDED HISTORY: Reason for exam:->complaints of pain Reason for Exam: complaints of pain FINDINGS: No joint effusion. No acute fracture. Small tricompartmental osteophytes with mild medial compartment joint space loss. Remaining bones and joint spaces are maintained. No acute abnormality. Mild osteoarthritis     XR CHEST PORTABLE    Result Date: 9/25/2022  EXAMINATION: ONE XRAY VIEW OF THE CHEST 9/25/2022 6:27 pm COMPARISON: None. HISTORY: ORDERING SYSTEM PROVIDED HISTORY: chest discomfort TECHNOLOGIST PROVIDED HISTORY: Reason for exam:->chest discomfort Reason for Exam: chest discomfort FINDINGS: Left chest wall Port-A-Cath line with tip in the SVC. Mild cardiomegaly. The lungs are without acute focal process. There is no effusion or pneumothorax.  The cardiomediastinal silhouette is without acute process. The osseous structures are without acute process. No acute process. Cardiomegaly.      Electronically signed by Aundrea Fletcher MD, MD on 9/29/2022 at 8:24 AM

## 2022-09-30 LAB
HCT VFR BLD CALC: 32.8 % (ref 37–47)
HEMOGLOBIN: 10.1 GM/DL (ref 12.5–16)
MCH RBC QN AUTO: 26.9 PG (ref 27–31)
MCHC RBC AUTO-ENTMCNC: 30.8 % (ref 32–36)
MCV RBC AUTO: 87.2 FL (ref 78–100)
PDW BLD-RTO: 13.7 % (ref 11.7–14.9)
PLATELET # BLD: 401 K/CU MM (ref 140–440)
PMV BLD AUTO: 10 FL (ref 7.5–11.1)
RBC # BLD: 3.76 M/CU MM (ref 4.2–5.4)
WBC # BLD: 9.6 K/CU MM (ref 4–10.5)

## 2022-09-30 PROCEDURE — 6370000000 HC RX 637 (ALT 250 FOR IP): Performed by: INTERNAL MEDICINE

## 2022-09-30 PROCEDURE — 6360000002 HC RX W HCPCS: Performed by: INTERNAL MEDICINE

## 2022-09-30 PROCEDURE — 85027 COMPLETE CBC AUTOMATED: CPT

## 2022-09-30 PROCEDURE — 2500000003 HC RX 250 WO HCPCS: Performed by: HOSPITALIST

## 2022-09-30 PROCEDURE — 97530 THERAPEUTIC ACTIVITIES: CPT

## 2022-09-30 PROCEDURE — 1200000000 HC SEMI PRIVATE

## 2022-09-30 PROCEDURE — 87075 CULTR BACTERIA EXCEPT BLOOD: CPT

## 2022-09-30 PROCEDURE — 87070 CULTURE OTHR SPECIMN AEROBIC: CPT

## 2022-09-30 PROCEDURE — 86146 BETA-2 GLYCOPROTEIN ANTIBODY: CPT

## 2022-09-30 PROCEDURE — 94761 N-INVAS EAR/PLS OXIMETRY MLT: CPT

## 2022-09-30 PROCEDURE — 6370000000 HC RX 637 (ALT 250 FOR IP): Performed by: STUDENT IN AN ORGANIZED HEALTH CARE EDUCATION/TRAINING PROGRAM

## 2022-09-30 PROCEDURE — 2580000003 HC RX 258: Performed by: INTERNAL MEDICINE

## 2022-09-30 PROCEDURE — 99232 SBSQ HOSP IP/OBS MODERATE 35: CPT | Performed by: INTERNAL MEDICINE

## 2022-09-30 PROCEDURE — 6360000002 HC RX W HCPCS: Performed by: STUDENT IN AN ORGANIZED HEALTH CARE EDUCATION/TRAINING PROGRAM

## 2022-09-30 PROCEDURE — 87449 NOS EACH ORGANISM AG IA: CPT

## 2022-09-30 PROCEDURE — 2580000003 HC RX 258: Performed by: STUDENT IN AN ORGANIZED HEALTH CARE EDUCATION/TRAINING PROGRAM

## 2022-09-30 PROCEDURE — 6370000000 HC RX 637 (ALT 250 FOR IP): Performed by: HOSPITALIST

## 2022-09-30 PROCEDURE — 97535 SELF CARE MNGMENT TRAINING: CPT

## 2022-09-30 PROCEDURE — 82525 ASSAY OF COPPER: CPT

## 2022-09-30 RX ORDER — LIDOCAINE 4 G/G
1 PATCH TOPICAL DAILY
Status: DISCONTINUED | OUTPATIENT
Start: 2022-09-30 | End: 2022-10-05 | Stop reason: HOSPADM

## 2022-09-30 RX ORDER — DOXYCYCLINE HYCLATE 100 MG
300 TABLET ORAL ONCE
Status: COMPLETED | OUTPATIENT
Start: 2022-09-30 | End: 2022-09-30

## 2022-09-30 RX ADMIN — HYDROMORPHONE HYDROCHLORIDE 0.5 MG: 1 INJECTION, SOLUTION INTRAMUSCULAR; INTRAVENOUS; SUBCUTANEOUS at 18:08

## 2022-09-30 RX ADMIN — LEVETIRACETAM 1000 MG: 500 TABLET, FILM COATED ORAL at 08:51

## 2022-09-30 RX ADMIN — OXYCODONE HYDROCHLORIDE 10 MG: 10 TABLET ORAL at 20:26

## 2022-09-30 RX ADMIN — CYANOCOBALAMIN TAB 1000 MCG 500 MCG: 1000 TAB at 08:51

## 2022-09-30 RX ADMIN — MICONAZOLE NITRATE: 2 POWDER TOPICAL at 20:30

## 2022-09-30 RX ADMIN — Medication 1000 UNITS: at 08:52

## 2022-09-30 RX ADMIN — GABAPENTIN 800 MG: 400 CAPSULE ORAL at 14:45

## 2022-09-30 RX ADMIN — LEVOTHYROXINE SODIUM 125 MCG: 25 TABLET ORAL at 05:31

## 2022-09-30 RX ADMIN — HYDROMORPHONE HYDROCHLORIDE 0.5 MG: 1 INJECTION, SOLUTION INTRAMUSCULAR; INTRAVENOUS; SUBCUTANEOUS at 22:13

## 2022-09-30 RX ADMIN — HYDROMORPHONE HYDROCHLORIDE 0.5 MG: 1 INJECTION, SOLUTION INTRAMUSCULAR; INTRAVENOUS; SUBCUTANEOUS at 11:45

## 2022-09-30 RX ADMIN — GABAPENTIN 800 MG: 400 CAPSULE ORAL at 20:25

## 2022-09-30 RX ADMIN — PAROXETINE HYDROCHLORIDE 30 MG: 20 TABLET, FILM COATED ORAL at 08:51

## 2022-09-30 RX ADMIN — OXYCODONE HYDROCHLORIDE 10 MG: 10 TABLET ORAL at 06:44

## 2022-09-30 RX ADMIN — TIZANIDINE 4 MG: 4 TABLET ORAL at 12:33

## 2022-09-30 RX ADMIN — ONDANSETRON 4 MG: 2 INJECTION INTRAMUSCULAR; INTRAVENOUS at 00:11

## 2022-09-30 RX ADMIN — VANCOMYCIN HYDROCHLORIDE 1250 MG: 1.25 INJECTION, POWDER, LYOPHILIZED, FOR SOLUTION INTRAVENOUS at 14:52

## 2022-09-30 RX ADMIN — MICONAZOLE NITRATE: 2 POWDER TOPICAL at 08:54

## 2022-09-30 RX ADMIN — LEVETIRACETAM 1000 MG: 500 TABLET, FILM COATED ORAL at 20:25

## 2022-09-30 RX ADMIN — PANTOPRAZOLE SODIUM 40 MG: 40 TABLET, DELAYED RELEASE ORAL at 05:32

## 2022-09-30 RX ADMIN — ONDANSETRON 4 MG: 2 INJECTION INTRAMUSCULAR; INTRAVENOUS at 18:07

## 2022-09-30 RX ADMIN — PREDNISONE 40 MG: 20 TABLET ORAL at 08:51

## 2022-09-30 RX ADMIN — SODIUM CHLORIDE, PRESERVATIVE FREE 10 ML: 5 INJECTION INTRAVENOUS at 08:50

## 2022-09-30 RX ADMIN — HYDROMORPHONE HYDROCHLORIDE 0.5 MG: 1 INJECTION, SOLUTION INTRAMUSCULAR; INTRAVENOUS; SUBCUTANEOUS at 08:50

## 2022-09-30 RX ADMIN — PROMETHAZINE HYDROCHLORIDE 25 MG: 25 INJECTION INTRAMUSCULAR; INTRAVENOUS at 15:09

## 2022-09-30 RX ADMIN — HYDROXYZINE HYDROCHLORIDE 50 MG: 50 TABLET, FILM COATED ORAL at 20:25

## 2022-09-30 RX ADMIN — TIZANIDINE 4 MG: 4 TABLET ORAL at 02:00

## 2022-09-30 RX ADMIN — DOXYCYCLINE HYCLATE 300 MG: 100 TABLET, COATED ORAL at 14:45

## 2022-09-30 RX ADMIN — ENOXAPARIN SODIUM 105 MG: 150 INJECTION SUBCUTANEOUS at 20:29

## 2022-09-30 RX ADMIN — HYDROMORPHONE HYDROCHLORIDE 0.5 MG: 1 INJECTION, SOLUTION INTRAMUSCULAR; INTRAVENOUS; SUBCUTANEOUS at 00:12

## 2022-09-30 RX ADMIN — SODIUM CHLORIDE, PRESERVATIVE FREE 10 ML: 5 INJECTION INTRAVENOUS at 20:26

## 2022-09-30 RX ADMIN — HYDROMORPHONE HYDROCHLORIDE 0.5 MG: 1 INJECTION, SOLUTION INTRAMUSCULAR; INTRAVENOUS; SUBCUTANEOUS at 05:25

## 2022-09-30 RX ADMIN — SODIUM CHLORIDE: 9 INJECTION, SOLUTION INTRAVENOUS at 14:50

## 2022-09-30 RX ADMIN — HYDROXYZINE HYDROCHLORIDE 50 MG: 50 TABLET, FILM COATED ORAL at 08:52

## 2022-09-30 RX ADMIN — ENOXAPARIN SODIUM 105 MG: 150 INJECTION SUBCUTANEOUS at 08:50

## 2022-09-30 RX ADMIN — PANTOPRAZOLE SODIUM 40 MG: 40 TABLET, DELAYED RELEASE ORAL at 14:45

## 2022-09-30 RX ADMIN — HYDROMORPHONE HYDROCHLORIDE 0.5 MG: 1 INJECTION, SOLUTION INTRAMUSCULAR; INTRAVENOUS; SUBCUTANEOUS at 14:44

## 2022-09-30 RX ADMIN — GABAPENTIN 800 MG: 400 CAPSULE ORAL at 08:51

## 2022-09-30 RX ADMIN — ONDANSETRON 4 MG: 2 INJECTION INTRAMUSCULAR; INTRAVENOUS at 11:43

## 2022-09-30 RX ADMIN — ONDANSETRON 4 MG: 2 INJECTION INTRAMUSCULAR; INTRAVENOUS at 05:31

## 2022-09-30 ASSESSMENT — ENCOUNTER SYMPTOMS
NAUSEA: 0
CHEST TIGHTNESS: 0
SHORTNESS OF BREATH: 0
VOMITING: 0
EYE PAIN: 0
SINUS PRESSURE: 0
DIARRHEA: 0
COLOR CHANGE: 1
VOICE CHANGE: 0
BACK PAIN: 1
EYE DISCHARGE: 0
SINUS PAIN: 0
ABDOMINAL PAIN: 0
COUGH: 1
BLOOD IN STOOL: 0

## 2022-09-30 ASSESSMENT — PAIN SCALES - GENERAL
PAINLEVEL_OUTOF10: 8
PAINLEVEL_OUTOF10: 10
PAINLEVEL_OUTOF10: 8
PAINLEVEL_OUTOF10: 7
PAINLEVEL_OUTOF10: 8
PAINLEVEL_OUTOF10: 8
PAINLEVEL_OUTOF10: 10
PAINLEVEL_OUTOF10: 10

## 2022-09-30 ASSESSMENT — PAIN DESCRIPTION - FREQUENCY
FREQUENCY: CONTINUOUS

## 2022-09-30 ASSESSMENT — PAIN DESCRIPTION - ONSET
ONSET: ON-GOING

## 2022-09-30 ASSESSMENT — PAIN DESCRIPTION - DESCRIPTORS
DESCRIPTORS: ACHING;THROBBING;BURNING
DESCRIPTORS: ACHING;BURNING
DESCRIPTORS: ACHING
DESCRIPTORS: ACHING;BURNING
DESCRIPTORS: BURNING;ACHING
DESCRIPTORS: ACHING
DESCRIPTORS: ACHING;BURNING;THROBBING
DESCRIPTORS: ACHING

## 2022-09-30 ASSESSMENT — PAIN DESCRIPTION - LOCATION
LOCATION: KNEE;LEG
LOCATION: LEG
LOCATION: KNEE;LEG
LOCATION: LEG
LOCATION: KNEE;LEG

## 2022-09-30 ASSESSMENT — PAIN DESCRIPTION - ORIENTATION
ORIENTATION: LEFT
ORIENTATION: LEFT
ORIENTATION: RIGHT
ORIENTATION: LEFT

## 2022-09-30 ASSESSMENT — PAIN - FUNCTIONAL ASSESSMENT
PAIN_FUNCTIONAL_ASSESSMENT: ACTIVITIES ARE NOT PREVENTED
PAIN_FUNCTIONAL_ASSESSMENT: PREVENTS OR INTERFERES SOME ACTIVE ACTIVITIES AND ADLS
PAIN_FUNCTIONAL_ASSESSMENT: ACTIVITIES ARE NOT PREVENTED

## 2022-09-30 ASSESSMENT — PAIN DESCRIPTION - PAIN TYPE
TYPE: ACUTE PAIN

## 2022-09-30 NOTE — PROGRESS NOTES
Infectious Disease Progress Note  2022   Patient Name: Tamela Lucio : 1970     Assessment  Left knee swelling:Continues to have pain in the left knee, however CRP is reduced. Synovial fluid culture remain negative. Uncertainty remains about if her knee is infected. After a benefit-risk assessment, it would be reasonable to treat with vancomycin. 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 Had 3 loose BM on 2022. Still seems unlikely to be cholera, but will treat. Acute DVT in left distal femoral and popliteal veins  Obesity BMI  40 .85 kg/m2      Plan  Therapeutic: IV vancomycin started on 22. Single dose of doxycycline 300 mg  Diagnostic: trend CRP, Fungitell. F/u:  Other:        Reason for visit: F/u left knee swelling  History:? Interval history noted  Report 3 loose BM, continues to have pain in left knee and reports popping sensation    Physical Exam:  Vital Signs: /83   Pulse 72   Temp 97.2 °F (36.2 °C) (Oral)   Resp 20   Ht 5' 4\" (1.626 m)   Wt 238 lb 1.6 oz (108 kg)   SpO2 100%   BMI 40.87 kg/m²     Gen: alert and oriented X3, no distress  Skin: no stigmata of endocarditis  Wounds: Left knee dressing C/D/I  Chest: no distress and CTA. Good air movement. Heart: RRR and no MRG. Abd: soft, non-distended, no tenderness, no hepatomegaly. Normoactive bowel sounds. Ext: left prepatellar effusion      Radiologic / Imaging / TESTING  No results found.      Labs:    Recent Results (from the past 24 hour(s))   CBC    Collection Time: 22 11:55 AM   Result Value Ref Range    WBC 9.6 4.0 - 10.5 K/CU MM    RBC 3.76 (L) 4.2 - 5.4 M/CU MM    Hemoglobin 10.1 (L) 12.5 - 16.0 GM/DL    Hematocrit 32.8 (L) 37 - 47 %    MCV 87.2 78 - 100 FL    MCH 26.9 (L) 27 - 31 PG    MCHC 30.8 (L) 32.0 - 36.0 %    RDW 13.7 11.7 - 14.9 %    Platelets 924 962 - 051 K/CU MM    MPV 10.0 7.5 - 11.1 FL       CULTURE results: Invalid input(s): BLOOD CULTURE,  URINE CULTURE, SURGICAL CULTURE    Diagnosis:  Patient Active Problem List   Diagnosis    Acute bilateral deep vein thrombosis (DVT) of femoral veins (HCC)    Acute deep vein thrombosis (DVT) of popliteal vein of left lower extremity (HCC)    Swelling of joint of left knee       Active Problems  Principal Problem:    Acute bilateral deep vein thrombosis (DVT) of femoral veins (HCC)  Active Problems:    Acute deep vein thrombosis (DVT) of popliteal vein of left lower extremity (HCC)    Swelling of joint of left knee  Resolved Problems:    * No resolved hospital problems.  *              Electronically signed by: Electronically signed by Elias Rocha MD on 9/30/2022 at 12:49 PM

## 2022-09-30 NOTE — PLAN OF CARE
Problem: Discharge Planning  Goal: Discharge to home or other facility with appropriate resources  9/30/2022 1046 by Rosario Calderon RN  Outcome: Progressing  9/30/2022 0847 by Rosario Calderon RN  Outcome: Progressing     Problem: Pain  Goal: Verbalizes/displays adequate comfort level or baseline comfort level  9/30/2022 1046 by Rosario Calderon RN  Outcome: Progressing  9/30/2022 0847 by Rosario Calderon RN  Outcome: Progressing     Problem: ABCDS Injury Assessment  Goal: Absence of physical injury  9/30/2022 1046 by Rosario Calderon RN  Outcome: Progressing  9/30/2022 0847 by Rosario Calderon RN  Outcome: Progressing     Problem: Safety - Adult  Goal: Free from fall injury  9/30/2022 1046 by Rosario Calderon RN  Outcome: Progressing  9/30/2022 0847 by Rosario Calderon RN  Outcome: Progressing

## 2022-09-30 NOTE — PROGRESS NOTES
[facility]  consulted by Dr. Cassie Rodrigez for monitoring and adjustment. Indication for treatment: Bone and Joint Infection  Goal trough: [] 10-15 mcg/mL or [x] 15-20 mcg/ml  AUC/GLORIA: [] <500 or [x] 400-600    Pertinent Laboratory Values:   Temp Readings from Last 3 Encounters:   09/30/22 97.2 °F (36.2 °C) (Oral)     Recent Labs     09/30/22  1155   WBC 9.6     No results for input(s): BUN, CREATININE in the last 72 hours. Estimated Creatinine Clearance: 130 mL/min (based on SCr of 0.6 mg/dL). No intake or output data in the 24 hours ending 09/30/22 1352      Vancomycin level:   TROUGH:  No results for input(s): VANCOTROUGH in the last 72 hours. RANDOM:    Recent Labs     09/28/22  0435   VANCORANDOM 4.4       Assessment:  SCr, BUN, and urine output: previous SCr @ 0.6, BUN @ 7  Day(s) of therapy: 1 (duration of therapy to be determined)  Vancomycin concentration: Pending    Plan:  Renal function stable, WNL  Will start on previous therapeutic dose of Vancomycin 1250mg IVPB every 12 hours  Predicted  and Trough 14.1 at steady state  BMP ordered for tomorrow AM  Will obtain random level in 2 days  Pharmacy will continue to monitor patient and adjust therapy as indicated    VANCOMYCIN CONCENTRATION SCHEDULED FOR 10/2  @ 0600. Thank you for the consult.   Carol Ann Delacruz, [address] [place] [address]  9/30/2022 1:52 PM

## 2022-09-30 NOTE — PROGRESS NOTES
V2.0  [facility] [place]      Name:  Mikhail Rai /Age/Sex: 1970  (46 y.o. female)   MRN & CSN:  [id number] & [id number] Encounter Date/Time: 2022 6:22 PM EDT    Location:  8441/2474-W PCP: No primary care provider on file. Hospital Day: 12    Assessment and Plan:   Mikhail Rai is a 46 y.o. female with pmh of  who presents with Acute bilateral deep vein thrombosis (DVT) of femoral veins ([address]      Plan:  Patient left knee effusion: Concerning for septic joint status post I&D . Ortho is following with the patient. Initial arthrocentesis was negative for any crystals. There was elevated WBC on synovial fluid with high neutrophil count concerning for inflammatory versus septic arthritis. Patient has underlying history of lupus but is not on any medications. Drain was initially placed that was removed on . Swab cultures from the left knee was negative. Synovial culture was negative. Active oozing of blood from the joint along with worsening swelling s/p repeat arthrocentesis o 2022 with significant relief in symptoms. Not on antibiotics anymore as per infectious disease. Possible hemarthrosis. Hematology oncology consult in place. Suspected delayed wound healing with underlying poor oral intake, recent gastric bypass history. Ortho repeated fluid draiange that was mostly clotted blood. Swelling is little better now. Hemarthrosis: hem onc on board, they believe pt does not have bleeding disorder of any sort but still they will get PLT function assay. Agree with the plan  Intractable nausea and vomiting, improving. CT abdominal pelvis showed stable pneumobilia. On scopolamine patch  Acute nonocclusive DVT of the left distal femoral and popliteal vein on therapeutic Lovenox. Hematology oncology comes following up the patient  Acute hypochromic microcytic anemia, likely from blood loss. Was on anticoagulation that is on hold. Transfuse 1 pack of RBC. Hemoglobin is 8.2 today. Hemoccult was positive. Osteoarthritis with underlying history of lupus: Was started on prednisone and she feels it is helping  History of recent bariatric surgery and delayed wound healing: Keeping in mind the poor nutrition status along with history of psoriasis, lupus we will order a bariatric panel including zinc and copper levels. We will also order a TSH level. Diet ADULT DIET; Regular   DVT Prophylaxis [x] Lovenox, []  Heparin, [] SCDs, [] Ambulation,  [] Eliquis, [] Xarelto  [] Coumadin   Code Status Full Code   Disposition From: Home  Expected Disposition: Home  Estimated Date of Discharge: 2 days  Patient requires continued admission due to Npo and stress test tomorrow   Surrogate Decision Maker/ POA      Subjective:     Chief Complaint: No chief complaint on file. The patient was seen at the bedside. Still in pain. Case discussed with her and ortho in detail. Wound care on board. Swelling is little better. Pain and discomfort is significant, likely mechanical. Also on neurontin for neuropathic pain. Review of Systems:    Review of Systems   Constitutional:  Positive for appetite change and unexpected weight change. Negative for chills and fever. HENT:  Negative for ear pain, hearing loss, sinus pressure, sinus pain and voice change. Eyes:  Negative for pain, discharge and visual disturbance. Respiratory:  Positive for cough. Negative for chest tightness and shortness of breath. Cardiovascular:  Positive for leg swelling. Negative for chest pain and palpitations. Gastrointestinal:  Negative for abdominal pain, blood in stool, diarrhea, nausea and vomiting. Genitourinary:  Negative for dysuria and frequency. Musculoskeletal:  Positive for arthralgias, back pain, gait problem and joint swelling. Skin:  Positive for color change, pallor, rash and wound. Neurological:  Positive for weakness and light-headedness. Negative for dizziness and headaches. Psychiatric/Behavioral:  Positive for sleep disturbance. Objective:   No intake or output data in the 24 hours ending 09/30/22 0927       Vitals:   Vitals:    09/30/22 0839   BP: 135/83   Pulse: 72   Resp: 20   Temp: 97.2 °F (36.2 °C)   SpO2: 100%       Physical Exam:   Physical Exam  Vitals and nursing note reviewed. Constitutional:       Appearance: Normal appearance. She is normal weight. HENT:      Head: Normocephalic. Nose: Nose normal.      Mouth/Throat:      Mouth: Mucous membranes are moist.   Eyes:      Conjunctiva/sclera: Conjunctivae normal.      Pupils: Pupils are equal, round, and reactive to light. Cardiovascular:      Rate and Rhythm: Normal rate and regular rhythm. Pulses: Normal pulses. Heart sounds: Normal heart sounds. No murmur heard. Pulmonary:      Effort: Pulmonary effort is normal.      Breath sounds: Normal breath sounds. No wheezing, rhonchi or rales. Abdominal:      General: Abdomen is flat. Bowel sounds are normal. There is no distension. Palpations: Abdomen is soft. Tenderness: There is no abdominal tenderness. Musculoskeletal:         General: Swelling, tenderness and signs of injury present. No deformity. Normal range of motion. Cervical back: Normal range of motion and neck supple. Right lower leg: No edema. Left lower leg: Edema present. Skin:     Coloration: Skin is not jaundiced or pale. Findings: Bruising, erythema and rash present. Neurological:      General: No focal deficit present. Mental Status: She is alert and oriented to person, place, and time. Mental status is at baseline.    Psychiatric:         Mood and Affect: Mood normal.          Medications:   Medications:    scopolamine  1 patch TransDERmal Q72H    predniSONE  40 mg Oral Daily    gabapentin  800 mg Oral TID    Vitamin D  1,000 Units Oral Daily    vitamin B-12  500 mcg Oral Daily    miconazole   Topical BID    sodium chloride flush 5-40 mL IntraVENous 2 times per day    enoxaparin  1 mg/kg SubCUTAneous BID    ondansetron  4 mg IntraVENous 4 times per day    PARoxetine  30 mg Oral Daily    hydrOXYzine HCl  50 mg Oral BID    levothyroxine  125 mcg Oral Daily    levETIRAcetam  1,000 mg Oral BID    pantoprazole  40 mg Oral BID AC      Infusions:    sodium chloride      sodium chloride       PRN Meds: sodium chloride, , PRN  tiZANidine, 4 mg, Q8H PRN  sodium chloride flush, 5-40 mL, PRN  sodium chloride, , PRN  oxyCODONE, 5 mg, Q4H PRN   Or  oxyCODONE, 10 mg, Q4H PRN  HYDROmorphone, 0.25 mg, Q3H PRN   Or  HYDROmorphone, 0.5 mg, Q3H PRN  ondansetron, 4 mg, Q8H PRN   Or  ondansetron, 4 mg, Q6H PRN  polyethylene glycol, 17 g, Daily PRN  acetaminophen, 650 mg, Q6H PRN   Or  acetaminophen, 650 mg, Q6H PRN  promethazine, 25 mg, Q6H PRN      Labs      No results found for this or any previous visit (from the past 24 hour(s)). Imaging/Diagnostics Last 24 Hours   XR KNEE LEFT (MIN 4 VIEWS)    Result Date: 9/27/2022  EXAMINATION: FOUR XRAY VIEWS OF THE LEFT KNEE 9/27/2022 11:40 am COMPARISON: None. HISTORY: ORDERING SYSTEM PROVIDED HISTORY: complaints of pain TECHNOLOGIST PROVIDED HISTORY: Reason for exam:->complaints of pain Reason for Exam: complaints of pain FINDINGS: No joint effusion. No acute fracture. Small tricompartmental osteophytes with mild medial compartment joint space loss. Remaining bones and joint spaces are maintained. No acute abnormality. Mild osteoarthritis     XR CHEST PORTABLE    Result Date: 9/25/2022  EXAMINATION: ONE XRAY VIEW OF THE CHEST 9/25/2022 6:27 pm COMPARISON: None. HISTORY: ORDERING SYSTEM PROVIDED HISTORY: chest discomfort TECHNOLOGIST PROVIDED HISTORY: Reason for exam:->chest discomfort Reason for Exam: chest discomfort FINDINGS: Left chest wall Port-A-Cath line with tip in the SVC. Mild cardiomegaly. The lungs are without acute focal process. There is no effusion or pneumothorax.  The cardiomediastinal silhouette is without acute process. The osseous structures are without acute process. No acute process. Cardiomegaly.      Electronically signed by Isabela Zhang MD, MD on 9/30/2022 at 9:27 AM

## 2022-09-30 NOTE — PROGRESS NOTES
Occupational Therapy Treatment Note  Name: Raegan Torres MRN: [id number] :   1970   Date:  2022   Admission Date: 2022 Room:  [address] [place] [address]-A     Primary Problem:  The primary encounter diagnosis was Acute deep vein thrombosis (DVT) of popliteal vein of left lower extremity ([facility] Diagnoses of Non-intractable vomiting with nausea, unspecified vomiting type, Generalized abdominal pain, and Injury of left knee, subsequent encounter were also pertinent to this visit. Pt  has a past medical history of Arthritis, Depression, Disease of blood and blood forming organ, Hypertension, Immune deficiency disorder ([facility] Kidney stone, Seizures ([facility] and Thyroid disease. Communication with other providers:  RN handoff    Subjective:  Patient states:  \"I just need to get through this and have them figure out my knee. \"  Pain: c/o LLE no rating   Restrictions: general, fall, tele, contact ISO   No one at bedside    Objective:    Observation:  pt was highfowlers upon arrival, agreeable to session  Objective Measures:  RA    Treatment, including education:    Therapeutic Activity Training:   Therapeutic activity training was instructed today. Cues were given for safety, sequence, UE/LE placement, visual cues, and balance. Activities performed today included:    Bed mobility:  Pt completed sup to/from sit SBA. Pt completed scooting to EOB SBA. Pt tolerated EOB well without evidence of lean SBA. STS:  Pt completed to/from EOB SBA. Pt completed to/from commode SBA. Ambulation:   Pt ambulated more than a functional household distance with 2 standing rest breaks SBA and use of FWW. Pt tolerated well without evidence of LOB. Pt cont to guard LLE d/t pain. Pt ambulated to/from bathroom SBA. Standing:  Pt tolerated standing SBA at EOB ~2mins for weight shifting cues. Self Care Training:   Self care training was performed today.   Cues were given for safety, sequence, UE/LE placement, visual cues, and balance. Activities performed today included: Toileting: Pt completed over standard commode. Pt managed pants and cha care. LB dressing: Pt completed in long seated to don nonslip socks and personal shoes.        Education: Role of OT, OT POC, safety, benefits of EOB/OOB activity, rationale for treatment, self-advocate, LLE elevation, compression ACE for LLE, ice for edema mgmt  Safety Measures: Gait belt used for safety of pt and therapist, Left in bed, Alarm in place, call light and phone within reach, lines managed, needs met    Assessment / Impression:      Patient's tolerance of treatment: Good   Adverse Reaction: none  Significant change in status and impact:  none  Barriers to improvement: strength, endurance, pain mgmt     Plan for Next Session:    Continue OT POC    Time in:  1246  Time out:  1311  Timed treatment minutes:  25  Total treatment time:  25    Electronically signed by:    INDER Espinoza/L  License: [id number]  8/03/8431, 3:38 PM

## 2022-09-30 NOTE — PROGRESS NOTES
Infection disease nurse called and she talked to Michael Weiss and they suggest doing a wound culture because the Vibrio can possible be in wounds. Dr. Iqra guzman.

## 2022-09-30 NOTE — PROGRESS NOTES
Infectious Disease Progress Note  2022   Patient Name: Leah Bermudez : 1970     Assessment  Left knee swelling:Continues to have pain in the left knee, however CRP is reduced. Synovial fluid culture remain negative. unlikely to be infectied  Abnormal lab test: V cholerae from GI PCR. Does not have diarrhea, hence, this is likely a false positive test.    Acute DVT in left distal femoral and popliteal veins  Obesity BMI  40 .85 kg/m2      Plan  Therapeutic: off abx  Diagnostic: trend CRP  F/u:  Other: .         Reason for visit: F/u left knee swelling  History:? Interval history noted  Denies n/v/d/f or untoward effects of antimicrobials    Physical Exam:  Vital Signs: /62   Pulse 71   Temp 98.3 °F (36.8 °C) (Oral)   Resp 20   Ht 5' 4\" (1.626 m)   Wt 238 lb 1.6 oz (108 kg)   SpO2 99%   BMI 40.87 kg/m²     Gen: alert and oriented X3, no distress  Skin: no stigmata of endocarditis  Wounds: Left knee dressing C/D/I  Chest: no distress and CTA. Good air movement. Heart: RRR and no MRG. Abd: soft, non-distended, no tenderness, no hepatomegaly. Normoactive bowel sounds. Radiologic / Imaging / TESTING  No results found. Labs:    No results found for this or any previous visit (from the past 24 hour(s)). CULTURE results: Invalid input(s): BLOOD CULTURE,  URINE CULTURE, SURGICAL CULTURE    Diagnosis:  Patient Active Problem List   Diagnosis    Acute bilateral deep vein thrombosis (DVT) of femoral veins (HCC)    Acute deep vein thrombosis (DVT) of popliteal vein of left lower extremity (HCC)    Swelling of joint of left knee       Active Problems  Principal Problem:    Acute bilateral deep vein thrombosis (DVT) of femoral veins (HCC)  Active Problems:    Acute deep vein thrombosis (DVT) of popliteal vein of left lower extremity (HCC)    Swelling of joint of left knee  Resolved Problems:    * No resolved hospital problems.  *              Electronically signed by: Electronically signed by Susan Moses MD on 9/29/2022 at 8:21 PM

## 2022-10-01 LAB
ANION GAP SERPL CALCULATED.3IONS-SCNC: 9 MMOL/L (ref 4–16)
BASOPHILS ABSOLUTE: 0.1 K/CU MM
BASOPHILS RELATIVE PERCENT: 1 % (ref 0–1)
BUN BLDV-MCNC: 15 MG/DL (ref 6–23)
CALCIUM SERPL-MCNC: 10.2 MG/DL (ref 8.3–10.6)
CHLORIDE BLD-SCNC: 103 MMOL/L (ref 99–110)
CO2: 28 MMOL/L (ref 21–32)
CREAT SERPL-MCNC: 0.8 MG/DL (ref 0.6–1.1)
DIFFERENTIAL TYPE: ABNORMAL
EOSINOPHILS ABSOLUTE: 0.2 K/CU MM
EOSINOPHILS RELATIVE PERCENT: 2.6 % (ref 0–3)
GFR AFRICAN AMERICAN: >60 ML/MIN/1.73M2
GFR NON-AFRICAN AMERICAN: >60 ML/MIN/1.73M2
GLUCOSE BLD-MCNC: 92 MG/DL (ref 70–99)
HCT VFR BLD CALC: 32.8 % (ref 37–47)
HEMOGLOBIN: 10 GM/DL (ref 12.5–16)
IMMATURE NEUTROPHIL %: 1 % (ref 0–0.43)
LYMPHOCYTES ABSOLUTE: 2.8 K/CU MM
LYMPHOCYTES RELATIVE PERCENT: 39.7 % (ref 24–44)
MCH RBC QN AUTO: 26.7 PG (ref 27–31)
MCHC RBC AUTO-ENTMCNC: 30.5 % (ref 32–36)
MCV RBC AUTO: 87.5 FL (ref 78–100)
MONOCYTES ABSOLUTE: 0.6 K/CU MM
MONOCYTES RELATIVE PERCENT: 8 % (ref 0–4)
NUCLEATED RBC %: 0 %
PDW BLD-RTO: 13.7 % (ref 11.7–14.9)
PLATELET # BLD: 373 K/CU MM (ref 140–440)
PMV BLD AUTO: 9.9 FL (ref 7.5–11.1)
POTASSIUM SERPL-SCNC: 4.4 MMOL/L (ref 3.5–5.1)
RBC # BLD: 3.75 M/CU MM (ref 4.2–5.4)
SEGMENTED NEUTROPHILS ABSOLUTE COUNT: 3.4 K/CU MM
SEGMENTED NEUTROPHILS RELATIVE PERCENT: 47.7 % (ref 36–66)
SODIUM BLD-SCNC: 140 MMOL/L (ref 135–145)
TOTAL IMMATURE NEUTOROPHIL: 0.07 K/CU MM
TOTAL NUCLEATED RBC: 0 K/CU MM
WBC # BLD: 7 K/CU MM (ref 4–10.5)

## 2022-10-01 PROCEDURE — 99232 SBSQ HOSP IP/OBS MODERATE 35: CPT | Performed by: INTERNAL MEDICINE

## 2022-10-01 PROCEDURE — 2580000003 HC RX 258: Performed by: STUDENT IN AN ORGANIZED HEALTH CARE EDUCATION/TRAINING PROGRAM

## 2022-10-01 PROCEDURE — 86147 CARDIOLIPIN ANTIBODY EA IG: CPT

## 2022-10-01 PROCEDURE — 6360000002 HC RX W HCPCS: Performed by: SURGERY

## 2022-10-01 PROCEDURE — 80048 BASIC METABOLIC PNL TOTAL CA: CPT

## 2022-10-01 PROCEDURE — 6370000000 HC RX 637 (ALT 250 FOR IP): Performed by: SURGERY

## 2022-10-01 PROCEDURE — 6360000002 HC RX W HCPCS: Performed by: INTERNAL MEDICINE

## 2022-10-01 PROCEDURE — 85025 COMPLETE CBC W/AUTO DIFF WBC: CPT

## 2022-10-01 PROCEDURE — 6360000002 HC RX W HCPCS: Performed by: STUDENT IN AN ORGANIZED HEALTH CARE EDUCATION/TRAINING PROGRAM

## 2022-10-01 PROCEDURE — 6370000000 HC RX 637 (ALT 250 FOR IP): Performed by: STUDENT IN AN ORGANIZED HEALTH CARE EDUCATION/TRAINING PROGRAM

## 2022-10-01 PROCEDURE — 2580000003 HC RX 258: Performed by: INTERNAL MEDICINE

## 2022-10-01 PROCEDURE — 1200000000 HC SEMI PRIVATE

## 2022-10-01 PROCEDURE — 6370000000 HC RX 637 (ALT 250 FOR IP): Performed by: HOSPITALIST

## 2022-10-01 PROCEDURE — 94761 N-INVAS EAR/PLS OXIMETRY MLT: CPT

## 2022-10-01 PROCEDURE — 84630 ASSAY OF ZINC: CPT

## 2022-10-01 PROCEDURE — 85613 RUSSELL VIPER VENOM DILUTED: CPT

## 2022-10-01 PROCEDURE — 84443 ASSAY THYROID STIM HORMONE: CPT

## 2022-10-01 RX ORDER — DIAZEPAM 5 MG/1
5 TABLET ORAL ONCE
Status: COMPLETED | OUTPATIENT
Start: 2022-10-01 | End: 2022-10-01

## 2022-10-01 RX ORDER — DIAZEPAM 5 MG/1
5 TABLET ORAL NIGHTLY
Status: COMPLETED | OUTPATIENT
Start: 2022-10-01 | End: 2022-10-01

## 2022-10-01 RX ORDER — ENOXAPARIN SODIUM 100 MG/ML
30 INJECTION SUBCUTANEOUS 2 TIMES DAILY
Status: DISCONTINUED | OUTPATIENT
Start: 2022-10-01 | End: 2022-10-05 | Stop reason: HOSPADM

## 2022-10-01 RX ADMIN — ENOXAPARIN SODIUM 30 MG: 100 INJECTION SUBCUTANEOUS at 22:06

## 2022-10-01 RX ADMIN — GABAPENTIN 800 MG: 400 CAPSULE ORAL at 08:31

## 2022-10-01 RX ADMIN — HYDROMORPHONE HYDROCHLORIDE 0.5 MG: 1 INJECTION, SOLUTION INTRAMUSCULAR; INTRAVENOUS; SUBCUTANEOUS at 03:01

## 2022-10-01 RX ADMIN — PAROXETINE HYDROCHLORIDE 30 MG: 20 TABLET, FILM COATED ORAL at 08:31

## 2022-10-01 RX ADMIN — ONDANSETRON 4 MG: 2 INJECTION INTRAMUSCULAR; INTRAVENOUS at 19:02

## 2022-10-01 RX ADMIN — HYDROMORPHONE HYDROCHLORIDE 0.5 MG: 1 INJECTION, SOLUTION INTRAMUSCULAR; INTRAVENOUS; SUBCUTANEOUS at 09:24

## 2022-10-01 RX ADMIN — LEVETIRACETAM 1000 MG: 500 TABLET, FILM COATED ORAL at 22:06

## 2022-10-01 RX ADMIN — GABAPENTIN 800 MG: 400 CAPSULE ORAL at 22:06

## 2022-10-01 RX ADMIN — Medication 1000 UNITS: at 08:31

## 2022-10-01 RX ADMIN — HYDROXYZINE HYDROCHLORIDE 50 MG: 50 TABLET, FILM COATED ORAL at 08:31

## 2022-10-01 RX ADMIN — HYDROMORPHONE HYDROCHLORIDE 1 MG: 1 INJECTION, SOLUTION INTRAMUSCULAR; INTRAVENOUS; SUBCUTANEOUS at 22:05

## 2022-10-01 RX ADMIN — PANTOPRAZOLE SODIUM 40 MG: 40 TABLET, DELAYED RELEASE ORAL at 05:33

## 2022-10-01 RX ADMIN — OXYCODONE HYDROCHLORIDE 10 MG: 10 TABLET ORAL at 05:47

## 2022-10-01 RX ADMIN — HYDROMORPHONE HYDROCHLORIDE 0.5 MG: 1 INJECTION, SOLUTION INTRAMUSCULAR; INTRAVENOUS; SUBCUTANEOUS at 12:50

## 2022-10-01 RX ADMIN — TIZANIDINE 4 MG: 4 TABLET ORAL at 08:36

## 2022-10-01 RX ADMIN — HYDROXYZINE HYDROCHLORIDE 50 MG: 50 TABLET, FILM COATED ORAL at 22:06

## 2022-10-01 RX ADMIN — ONDANSETRON 4 MG: 2 INJECTION INTRAMUSCULAR; INTRAVENOUS at 05:34

## 2022-10-01 RX ADMIN — ONDANSETRON 4 MG: 2 INJECTION INTRAMUSCULAR; INTRAVENOUS at 12:50

## 2022-10-01 RX ADMIN — ENOXAPARIN SODIUM 105 MG: 150 INJECTION SUBCUTANEOUS at 08:37

## 2022-10-01 RX ADMIN — PROMETHAZINE HYDROCHLORIDE 25 MG: 25 INJECTION INTRAMUSCULAR; INTRAVENOUS at 15:21

## 2022-10-01 RX ADMIN — VANCOMYCIN HYDROCHLORIDE 1250 MG: 1.25 INJECTION, POWDER, LYOPHILIZED, FOR SOLUTION INTRAVENOUS at 03:08

## 2022-10-01 RX ADMIN — VANCOMYCIN HYDROCHLORIDE 1250 MG: 1.25 INJECTION, POWDER, LYOPHILIZED, FOR SOLUTION INTRAVENOUS at 15:21

## 2022-10-01 RX ADMIN — HYDROMORPHONE HYDROCHLORIDE 1 MG: 1 INJECTION, SOLUTION INTRAMUSCULAR; INTRAVENOUS; SUBCUTANEOUS at 19:03

## 2022-10-01 RX ADMIN — GABAPENTIN 800 MG: 400 CAPSULE ORAL at 15:14

## 2022-10-01 RX ADMIN — SODIUM CHLORIDE, PRESERVATIVE FREE 10 ML: 5 INJECTION INTRAVENOUS at 22:07

## 2022-10-01 RX ADMIN — PANTOPRAZOLE SODIUM 40 MG: 40 TABLET, DELAYED RELEASE ORAL at 15:14

## 2022-10-01 RX ADMIN — HYDROMORPHONE HYDROCHLORIDE 0.5 MG: 1 INJECTION, SOLUTION INTRAMUSCULAR; INTRAVENOUS; SUBCUTANEOUS at 06:23

## 2022-10-01 RX ADMIN — OXYCODONE HYDROCHLORIDE 10 MG: 10 TABLET ORAL at 13:38

## 2022-10-01 RX ADMIN — OXYCODONE HYDROCHLORIDE 5 MG: 5 TABLET ORAL at 00:04

## 2022-10-01 RX ADMIN — HYDROMORPHONE HYDROCHLORIDE 1 MG: 1 INJECTION, SOLUTION INTRAMUSCULAR; INTRAVENOUS; SUBCUTANEOUS at 16:11

## 2022-10-01 RX ADMIN — DIAZEPAM 5 MG: 5 TABLET ORAL at 10:45

## 2022-10-01 RX ADMIN — SODIUM CHLORIDE, PRESERVATIVE FREE 10 ML: 5 INJECTION INTRAVENOUS at 08:33

## 2022-10-01 RX ADMIN — LEVOTHYROXINE SODIUM 125 MCG: 25 TABLET ORAL at 05:33

## 2022-10-01 RX ADMIN — DIAZEPAM 5 MG: 5 TABLET ORAL at 22:06

## 2022-10-01 RX ADMIN — ONDANSETRON 4 MG: 2 INJECTION INTRAMUSCULAR; INTRAVENOUS at 22:06

## 2022-10-01 RX ADMIN — CYANOCOBALAMIN TAB 1000 MCG 500 MCG: 1000 TAB at 08:31

## 2022-10-01 RX ADMIN — PREDNISONE 40 MG: 20 TABLET ORAL at 08:31

## 2022-10-01 RX ADMIN — LEVETIRACETAM 1000 MG: 500 TABLET, FILM COATED ORAL at 08:31

## 2022-10-01 RX ADMIN — HYDROMORPHONE HYDROCHLORIDE 1 MG: 1 INJECTION, SOLUTION INTRAMUSCULAR; INTRAVENOUS; SUBCUTANEOUS at 10:43

## 2022-10-01 RX ADMIN — ONDANSETRON 4 MG: 2 INJECTION INTRAMUSCULAR; INTRAVENOUS at 06:34

## 2022-10-01 RX ADMIN — ONDANSETRON 4 MG: 2 INJECTION INTRAMUSCULAR; INTRAVENOUS at 00:05

## 2022-10-01 ASSESSMENT — PAIN SCALES - GENERAL
PAINLEVEL_OUTOF10: 10
PAINLEVEL_OUTOF10: 10
PAINLEVEL_OUTOF10: 8
PAINLEVEL_OUTOF10: 8
PAINLEVEL_OUTOF10: 10
PAINLEVEL_OUTOF10: 8
PAINLEVEL_OUTOF10: 6
PAINLEVEL_OUTOF10: 10
PAINLEVEL_OUTOF10: 8
PAINLEVEL_OUTOF10: 9
PAINLEVEL_OUTOF10: 10

## 2022-10-01 ASSESSMENT — PAIN DESCRIPTION - DESCRIPTORS
DESCRIPTORS: ACHING
DESCRIPTORS: STABBING;SHARP
DESCRIPTORS: SHARP;STABBING
DESCRIPTORS: STABBING
DESCRIPTORS: ACHING
DESCRIPTORS: STABBING;SHARP
DESCRIPTORS: ACHING
DESCRIPTORS: STABBING;SHARP
DESCRIPTORS: STABBING;SHARP
DESCRIPTORS: STABBING

## 2022-10-01 ASSESSMENT — PAIN DESCRIPTION - ORIENTATION
ORIENTATION: LEFT

## 2022-10-01 ASSESSMENT — PAIN DESCRIPTION - LOCATION
LOCATION: KNEE
LOCATION: ABDOMEN
LOCATION: KNEE;LEG
LOCATION: KNEE
LOCATION: LEG

## 2022-10-01 ASSESSMENT — PAIN - FUNCTIONAL ASSESSMENT
PAIN_FUNCTIONAL_ASSESSMENT: PREVENTS OR INTERFERES SOME ACTIVE ACTIVITIES AND ADLS

## 2022-10-01 NOTE — PROGRESS NOTES
V2.0  [facility] Hospitalist Progress Note      Name:  Argentina Moon /Age/Sex: 1970  (46 y.o. female)   MRN & CSN:  [id number] & [id number] Encounter Date/Time: 10/1/2022 10:27 AM EDT    Location:  [facility] PCP: No primary care provider on file. Hospital Day: 13    Assessment and Plan:   Argentina Moon is a 46 y.o. female with left knee pain      Plan:    L knee hemarthrosis  - very painful today, likely recurrence  - ortho contacted   - Lovenox reduced to ppx dose  - ortho recommending compression wrap compliance and no intervention at this time    Nausea/vomiting - resolved  - stable pneumobilia on CT  - low to no suspicion for cholangitis    DVT   - L distal femoral and popliteal  - had to reduce Lovenox to ppx dosing given hemarthrosis  - may require IVC filter, but will obtain repeat duplex prior to making this decision    Anemia  - blood loss  - s/p 1 unit pRBC    OA  Lupus  Psoriasis  Recent bariatric surgery and delayed wound healing    Ppx: Lovenox  Dispo: TBD    Subjective:     Chief Complaint: No chief complaint on file. Excruciating left knee pain and swelling. No further complaints. Review of Systems:    Review of Systems    10 point ROS negative except as stated above in \"subjective\" section    Objective:   No intake or output data in the 24 hours ending 10/01/22 1027     Vitals:   Vitals:    10/01/22 0828   BP: (!) 142/86   Pulse: 75   Resp: 16   Temp: 97.9 °F (36.6 °C)   SpO2: 99%       Physical Exam:     General: NAD  Eyes: EOMI  ENT: neck supple  Cardiovascular: Regular rate. Respiratory: Clear to auscultation  Gastrointestinal: Soft, non tender  Genitourinary: no suprapubic tenderness  Musculoskeletal: left knee swelling and tenderness Sutures from recent arthroscopy noted  Skin: warm, dry  Neuro: Alert. Psych: Mood appropriate.      Medications:   Medications:    enoxaparin  30 mg SubCUTAneous BID    HYDROmorphone  1 mg IntraVENous Once    diazePAM  5 mg Oral Once    lidocaine  1 patch TransDERmal Daily    vancomycin  1,250 mg IntraVENous Q12H    scopolamine  1 patch TransDERmal Q72H    predniSONE  40 mg Oral Daily    gabapentin  800 mg Oral TID    Vitamin D  1,000 Units Oral Daily    vitamin B-12  500 mcg Oral Daily    miconazole   Topical BID    sodium chloride flush  5-40 mL IntraVENous 2 times per day    ondansetron  4 mg IntraVENous 4 times per day    PARoxetine  30 mg Oral Daily    hydrOXYzine HCl  50 mg Oral BID    levothyroxine  125 mcg Oral Daily    levETIRAcetam  1,000 mg Oral BID    pantoprazole  40 mg Oral BID AC      Infusions:    sodium chloride      sodium chloride 100 mL/hr at 10/01/22 0308     PRN Meds: HYDROmorphone, 0.25 mg, Q2H PRN   Or  HYDROmorphone, 0.5 mg, Q2H PRN  sodium chloride, , PRN  tiZANidine, 4 mg, Q8H PRN  sodium chloride flush, 5-40 mL, PRN  sodium chloride, , PRN  oxyCODONE, 5 mg, Q4H PRN   Or  oxyCODONE, 10 mg, Q4H PRN  ondansetron, 4 mg, Q8H PRN   Or  ondansetron, 4 mg, Q6H PRN  polyethylene glycol, 17 g, Daily PRN  acetaminophen, 650 mg, Q6H PRN   Or  acetaminophen, 650 mg, Q6H PRN  promethazine, 25 mg, Q6H PRN        Labs      Recent Results (from the past 24 hour(s))   CBC    Collection Time: 09/30/22 11:55 AM   Result Value Ref Range    WBC 9.6 4.0 - 10.5 K/CU MM    RBC 3.76 (L) 4.2 - 5.4 M/CU MM    Hemoglobin 10.1 (L) 12.5 - 16.0 GM/DL    Hematocrit 32.8 (L) 37 - 47 %    MCV 87.2 78 - 100 FL    MCH 26.9 (L) 27 - 31 PG    MCHC 30.8 (L) 32.0 - 36.0 %    RDW 13.7 11.7 - 14.9 %    Platelets 746 208 - 947 K/CU MM    MPV 10.0 7.5 - 11.1 FL   Basic Metabolic Panel    Collection Time: 10/01/22  5:50 AM   Result Value Ref Range    Sodium 140 135 - 145 MMOL/L    Potassium 4.4 3.5 - 5.1 MMOL/L    Chloride 103 99 - 110 mMol/L    CO2 28 21 - 32 MMOL/L    Anion Gap 9 4 - 16    BUN 15 6 - 23 MG/DL    Creatinine 0.8 0.6 - 1.1 MG/DL    Glucose 92 70 - 99 MG/DL    Calcium 10.2 8.3 - 10.6 MG/DL    GFR Non-African American >60 >60 mL/min/1.73m2    GFR African American >60 >60 mL/min/1.73m2   CBC with Auto Differential    Collection Time: 10/01/22  6:38 AM   Result Value Ref Range    WBC 7.0 4.0 - 10.5 K/CU MM    RBC 3.75 (L) 4.2 - 5.4 M/CU MM    Hemoglobin 10.0 (L) 12.5 - 16.0 GM/DL    Hematocrit 32.8 (L) 37 - 47 %    MCV 87.5 78 - 100 FL    MCH 26.7 (L) 27 - 31 PG    MCHC 30.5 (L) 32.0 - 36.0 %    RDW 13.7 11.7 - 14.9 %    Platelets 878 728 - 762 K/CU MM    MPV 9.9 7.5 - 11.1 FL    Differential Type AUTOMATED DIFFERENTIAL     Segs Relative 47.7 36 - 66 %    Lymphocytes % 39.7 24 - 44 %    Monocytes % 8.0 (H) 0 - 4 %    Eosinophils % 2.6 0 - 3 %    Basophils % 1.0 0 - 1 %    Segs Absolute 3.4 K/CU MM    Lymphocytes Absolute 2.8 K/CU MM    Monocytes Absolute 0.6 K/CU MM    Eosinophils Absolute 0.2 K/CU MM    Basophils Absolute 0.1 K/CU MM    Nucleated RBC % 0.0 %    Total Nucleated RBC 0.0 K/CU MM    Total Immature Neutrophil 0.07 K/CU MM    Immature Neutrophil % 1.0 (H) 0 - 0.43 %        Imaging/Diagnostics Last 24 Hours   No results found.     Electronically signed by Darek Murrieta MD on 10/1/2022 at 10:27 AM

## 2022-10-01 NOTE — PROGRESS NOTES
Wallace Serrano is a 46 y.o. female patient. 1. Acute deep vein thrombosis (DVT) of popliteal vein of left lower extremity ([facility] [address]    2. Non-intractable vomiting with nausea, unspecified vomiting type    3. Generalized abdominal pain    4. Injury of left knee, subsequent encounter      Past Medical History:   Diagnosis Date    Arthritis     Depression     Disease of blood and blood forming organ     Hypertension     Immune deficiency disorder ([facility] [address]     Kidney stone     Seizures ([facility] [address]     Thyroid disease      No past surgical history pertinent negatives on file.   Scheduled Meds:   enoxaparin  30 mg SubCUTAneous BID    lidocaine  1 patch TransDERmal Daily    vancomycin  1,250 mg IntraVENous Q12H    scopolamine  1 patch TransDERmal Q72H    predniSONE  40 mg Oral Daily    gabapentin  800 mg Oral TID    Vitamin D  1,000 Units Oral Daily    vitamin B-12  500 mcg Oral Daily    miconazole   Topical BID    sodium chloride flush  5-40 mL IntraVENous 2 times per day    ondansetron  4 mg IntraVENous 4 times per day    PARoxetine  30 mg Oral Daily    hydrOXYzine HCl  50 mg Oral BID    levothyroxine  125 mcg Oral Daily    levETIRAcetam  1,000 mg Oral BID    pantoprazole  40 mg Oral BID AC     Continuous Infusions:   sodium chloride      sodium chloride 100 mL/hr at 10/01/22 0308     PRN Meds:HYDROmorphone **OR** HYDROmorphone, sodium chloride, tiZANidine, sodium chloride flush, sodium chloride, ondansetron **OR** ondansetron, polyethylene glycol, acetaminophen **OR** acetaminophen, promethazine    Allergies   Allergen Reactions    Haldol [Haloperidol] Other (See Comments)     States \"shook severely and had to have Benadryl\"    Asa [Aspirin]     Compazine [Prochlorperazine]     Reglan [Metoclopramide]     Toradol [Ketorolac Tromethamine]      Principal Problem:    Acute bilateral deep vein thrombosis (DVT) of femoral veins (HCC)  Active Problems:    Acute deep vein thrombosis (DVT) of popliteal vein of left lower extremity ([facility] [address] Swelling of joint of left knee  Resolved Problems:    * No resolved hospital problems. *    Blood pressure (!) 140/89, pulse 77, temperature 98 °F (36.7 °C), temperature source Oral, resp. rate 17, height 5' 4\" (1.626 m), weight 238 lb 1.6 oz (108 kg), SpO2 97 %. Subjective  Called to evaluate patient today due to increasing pain in the left knee. She states that beginning last night she began having worsening deep, aching and throbbing pain globally in the left knee. She states that she continues to have worsening pain with attempted range of motion of the left knee. Objective  LLE -sutures present, no redness, no signs of infection. Moderate ecchymosis, mild knee effusion, likely coagulated hematoma, no obvious fluctuance present within the knee joint. Moderate pain with attempted range of motion of the left knee. Intact sensation motor function ulnar distributions left lower extremity, compartments soft. Assessment & Plan  Status post I&D left knee    Continue with medical management. Would not recommend arthrocentesis at this point since this is likely coagulated blood. No additional surgical treatment at this time. Continue weight-bearing as tolerated. Continue range of motion exercises as instructed. Ice and elevate as needed. Tylenol or Motrin for pain. I did discuss the case with Dr. Renetta Ventura who will reevaluate her on Monday.     Dorota Griffith,   10/1/2022

## 2022-10-01 NOTE — PROGRESS NOTES
Physical Therapy  Attempt Note    Charting indicative of need for ortho follow up for determination of need for potential medical intervention ; pain and swelling elevated on this date.  Will cont to follow and attempt as able to assist.     Sadi Adkins PT, DPT

## 2022-10-01 NOTE — PROGRESS NOTES
HEMATOLOGY ONCOLOGY   PROGRESS NOTE      Patient was seen and examined today. She still has significant pain in left knee surgical site. PHYSICAL EXAM    Vitals: BP (!) 142/86   Pulse 75   Temp 97.9 °F (36.6 °C) (Oral)   Resp 16   Ht 5' 4\" (1.626 m)   Wt 238 lb 1.6 oz (108 kg)   SpO2 99%   BMI 40.87 kg/m²   CONSTITUTIONAL: awake, alert, cooperative, no apparent distress   EYES: pupils equal, round and reactive to light, sclera clear and conjunctiva normal  ENT: Normocephalic, without obvious abnormality, atraumatic  NECK: supple, symmetrical, no jugular venous distension and no carotid bruits   HEMATOLOGIC/LYMPHATIC: no cervical, supraclavicular or axillary lymphadenopathy   LUNGS: no increased work of breathing and clear to auscultation   CARDIOVASCULAR: regular rate and rhythm, normal S1 and S2, no murmur noted  ABDOMEN: normal bowel sounds x 4, soft, non-distended, non-tender, no masses palpated, no hepatosplenomgaly   MUSCULOSKELETAL: full range of motion noted, tone is normal  NEUROLOGIC: awake, alert, oriented to name, place and time. Motor skills grossly intact. SKIN: Normal skin color, texture, turgor and no jaundice. appears intact   EXTREMITIES: Some bruises noted around left knee, surgical wound are clean     LABORATORY RESULTS  CBC:   Recent Labs     09/30/22  1155 10/01/22  0638   WBC 9.6 7.0   HGB 10.1* 10.0*    373     BMP:    Recent Labs     10/01/22  0550      K 4.4      CO2 28   BUN 15   CREATININE 0.8   GLUCOSE 92     Hepatic: No results for input(s): AST, ALT, ALB, BILITOT, ALKPHOS in the last 72 hours. INR: No results for input(s): INR in the last 72 hours. ASSESSMENT/RECOMMENDATION  Left knee hemarthrosis - doubt that she has bleeding disorder. Her PT/PTT/INR were normal on admission and no bleeding history before. Platelet disorder is unlikely since she doesn't have history of mucocutaneous bleeding history before.  In addition, platelet disorder is not usually cause hemarthrosis. She is on antibiotics now and ortho is following. Left distal femoral and popliteal vein DVT - in view of above, I recommend to decrease lovenox dose to prophylactic dose starting today. Since she only has distal femoral vein, will not consider IVC filter for now. Recommend to have repeat doppler in Monday to make sure that she doesn't have progression of her DVT. [facility] We will continue to follow the patient. Thank you.

## 2022-10-01 NOTE — PROGRESS NOTES
[address] [facility]  consulted by Dr. Cristina Izaguirre for monitoring and adjustment. Indication for treatment: Bone and Joint Infection  Goal trough: [] 10-15 mcg/mL or [x] 15-20 mcg/ml  AUC/GLORIA: [] <500 or [x] 400-600    Pertinent Laboratory Values:   Temp Readings from Last 3 Encounters:   10/01/22 97.9 °F (36.6 °C) (Oral)     Recent Labs     09/30/22  1155 10/01/22  0638   WBC 9.6 7.0       Recent Labs     10/01/22  0550   BUN 15   CREATININE 0.8     Estimated Creatinine Clearance: 98 mL/min (based on SCr of 0.8 mg/dL). No intake or output data in the 24 hours ending 10/01/22 1354      Vancomycin level:   TROUGH:  No results for input(s): VANCOTROUGH in the last 72 hours. RANDOM:    No results for input(s): VANCORANDOM in the last 72 hours. Assessment:  SCr, BUN, and urine output: SCr @ 0.8, BUN @ 15  Day(s) of therapy: 2 (duration of therapy to be determined)  Vancomycin concentration: Pending    Plan:  Renal function stable, WNL  Continue on previously therapeutic dose of Vancomycin 1250mg IVPB every 12 hours  Predicted  and Trough 14.1 at steady state  Will obtain random level in 2 days  Pharmacy will continue to monitor patient and adjust therapy as indicated    VANCOMYCIN CONCENTRATION SCHEDULED FOR 10/2  @ 0600. Thank you for the consult.   Glenora Brunner, [facility]  10/1/2022 1:54 PM

## 2022-10-02 LAB
DOSE AMOUNT: NORMAL
DOSE TIME: NORMAL
ERYTHROCYTE SEDIMENTATION RATE: 34 MM/HR (ref 0–30)
HIGH SENSITIVE C-REACTIVE PROTEIN: 9.9 MG/L (ref 0–5)
T3 FREE: 2 PG/ML (ref 2.3–4.2)
T4 FREE: 1.31 NG/DL (ref 0.9–1.8)
TSH HIGH SENSITIVITY: 14.75 UIU/ML (ref 0.27–4.2)
TSH HIGH SENSITIVITY: 3.43 UIU/ML (ref 0.27–4.2)
VANCOMYCIN RANDOM: 34.3 UG/ML

## 2022-10-02 PROCEDURE — 6370000000 HC RX 637 (ALT 250 FOR IP): Performed by: HOSPITALIST

## 2022-10-02 PROCEDURE — 2500000003 HC RX 250 WO HCPCS: Performed by: HOSPITALIST

## 2022-10-02 PROCEDURE — 84443 ASSAY THYROID STIM HORMONE: CPT

## 2022-10-02 PROCEDURE — 6360000002 HC RX W HCPCS: Performed by: STUDENT IN AN ORGANIZED HEALTH CARE EDUCATION/TRAINING PROGRAM

## 2022-10-02 PROCEDURE — 94761 N-INVAS EAR/PLS OXIMETRY MLT: CPT

## 2022-10-02 PROCEDURE — 2580000003 HC RX 258: Performed by: STUDENT IN AN ORGANIZED HEALTH CARE EDUCATION/TRAINING PROGRAM

## 2022-10-02 PROCEDURE — 84439 ASSAY OF FREE THYROXINE: CPT

## 2022-10-02 PROCEDURE — 6370000000 HC RX 637 (ALT 250 FOR IP): Performed by: STUDENT IN AN ORGANIZED HEALTH CARE EDUCATION/TRAINING PROGRAM

## 2022-10-02 PROCEDURE — 86141 C-REACTIVE PROTEIN HS: CPT

## 2022-10-02 PROCEDURE — 85652 RBC SED RATE AUTOMATED: CPT

## 2022-10-02 PROCEDURE — 2580000003 HC RX 258: Performed by: INTERNAL MEDICINE

## 2022-10-02 PROCEDURE — 1200000000 HC SEMI PRIVATE

## 2022-10-02 PROCEDURE — 80202 ASSAY OF VANCOMYCIN: CPT

## 2022-10-02 PROCEDURE — 6360000002 HC RX W HCPCS: Performed by: SURGERY

## 2022-10-02 PROCEDURE — 6370000000 HC RX 637 (ALT 250 FOR IP): Performed by: SURGERY

## 2022-10-02 PROCEDURE — 84481 FREE ASSAY (FT-3): CPT

## 2022-10-02 PROCEDURE — 6360000002 HC RX W HCPCS: Performed by: INTERNAL MEDICINE

## 2022-10-02 RX ORDER — DIAZEPAM 5 MG/1
5 TABLET ORAL EVERY 8 HOURS PRN
Status: DISCONTINUED | OUTPATIENT
Start: 2022-10-02 | End: 2022-10-05 | Stop reason: HOSPADM

## 2022-10-02 RX ORDER — MORPHINE SULFATE 15 MG/1
15 TABLET, FILM COATED, EXTENDED RELEASE ORAL EVERY 12 HOURS SCHEDULED
Status: DISCONTINUED | OUTPATIENT
Start: 2022-10-02 | End: 2022-10-05 | Stop reason: HOSPADM

## 2022-10-02 RX ADMIN — GABAPENTIN 800 MG: 400 CAPSULE ORAL at 08:50

## 2022-10-02 RX ADMIN — ONDANSETRON 4 MG: 2 INJECTION INTRAMUSCULAR; INTRAVENOUS at 05:47

## 2022-10-02 RX ADMIN — MICONAZOLE NITRATE: 2 POWDER TOPICAL at 20:31

## 2022-10-02 RX ADMIN — GABAPENTIN 800 MG: 400 CAPSULE ORAL at 14:11

## 2022-10-02 RX ADMIN — PAROXETINE HYDROCHLORIDE 30 MG: 20 TABLET, FILM COATED ORAL at 08:50

## 2022-10-02 RX ADMIN — ENOXAPARIN SODIUM 30 MG: 100 INJECTION SUBCUTANEOUS at 20:30

## 2022-10-02 RX ADMIN — ONDANSETRON 4 MG: 2 INJECTION INTRAMUSCULAR; INTRAVENOUS at 17:17

## 2022-10-02 RX ADMIN — TIZANIDINE 4 MG: 4 TABLET ORAL at 08:49

## 2022-10-02 RX ADMIN — TIZANIDINE 4 MG: 4 TABLET ORAL at 23:54

## 2022-10-02 RX ADMIN — VANCOMYCIN HYDROCHLORIDE 1250 MG: 1.25 INJECTION, POWDER, LYOPHILIZED, FOR SOLUTION INTRAVENOUS at 01:13

## 2022-10-02 RX ADMIN — HYDROXYZINE HYDROCHLORIDE 50 MG: 50 TABLET, FILM COATED ORAL at 20:30

## 2022-10-02 RX ADMIN — MORPHINE SULFATE 15 MG: 15 TABLET, FILM COATED, EXTENDED RELEASE ORAL at 20:29

## 2022-10-02 RX ADMIN — ENOXAPARIN SODIUM 30 MG: 100 INJECTION SUBCUTANEOUS at 08:51

## 2022-10-02 RX ADMIN — PANTOPRAZOLE SODIUM 40 MG: 40 TABLET, DELAYED RELEASE ORAL at 17:17

## 2022-10-02 RX ADMIN — LEVETIRACETAM 1000 MG: 500 TABLET, FILM COATED ORAL at 20:29

## 2022-10-02 RX ADMIN — LEVETIRACETAM 1000 MG: 500 TABLET, FILM COATED ORAL at 08:50

## 2022-10-02 RX ADMIN — LEVOTHYROXINE SODIUM 125 MCG: 25 TABLET ORAL at 05:47

## 2022-10-02 RX ADMIN — PREDNISONE 40 MG: 20 TABLET ORAL at 08:50

## 2022-10-02 RX ADMIN — HYDROMORPHONE HYDROCHLORIDE 1 MG: 1 INJECTION, SOLUTION INTRAMUSCULAR; INTRAVENOUS; SUBCUTANEOUS at 21:54

## 2022-10-02 RX ADMIN — DIAZEPAM 5 MG: 5 TABLET ORAL at 17:17

## 2022-10-02 RX ADMIN — HYDROXYZINE HYDROCHLORIDE 50 MG: 50 TABLET, FILM COATED ORAL at 08:50

## 2022-10-02 RX ADMIN — PANTOPRAZOLE SODIUM 40 MG: 40 TABLET, DELAYED RELEASE ORAL at 05:47

## 2022-10-02 RX ADMIN — Medication 1000 UNITS: at 08:50

## 2022-10-02 RX ADMIN — MORPHINE SULFATE 15 MG: 15 TABLET, FILM COATED, EXTENDED RELEASE ORAL at 11:57

## 2022-10-02 RX ADMIN — PROMETHAZINE HYDROCHLORIDE 25 MG: 25 INJECTION INTRAMUSCULAR; INTRAVENOUS at 11:58

## 2022-10-02 RX ADMIN — HYDROMORPHONE HYDROCHLORIDE 1 MG: 1 INJECTION, SOLUTION INTRAMUSCULAR; INTRAVENOUS; SUBCUTANEOUS at 01:10

## 2022-10-02 RX ADMIN — GABAPENTIN 800 MG: 400 CAPSULE ORAL at 20:30

## 2022-10-02 RX ADMIN — CYANOCOBALAMIN TAB 1000 MCG 500 MCG: 1000 TAB at 08:50

## 2022-10-02 RX ADMIN — SODIUM CHLORIDE, PRESERVATIVE FREE 10 ML: 5 INJECTION INTRAVENOUS at 20:30

## 2022-10-02 RX ADMIN — HYDROMORPHONE HYDROCHLORIDE 1 MG: 1 INJECTION, SOLUTION INTRAMUSCULAR; INTRAVENOUS; SUBCUTANEOUS at 08:59

## 2022-10-02 RX ADMIN — ONDANSETRON 4 MG: 2 INJECTION INTRAMUSCULAR; INTRAVENOUS at 12:00

## 2022-10-02 RX ADMIN — ONDANSETRON 4 MG: 2 INJECTION INTRAMUSCULAR; INTRAVENOUS at 23:54

## 2022-10-02 RX ADMIN — HYDROMORPHONE HYDROCHLORIDE 1 MG: 1 INJECTION, SOLUTION INTRAMUSCULAR; INTRAVENOUS; SUBCUTANEOUS at 14:06

## 2022-10-02 RX ADMIN — HYDROMORPHONE HYDROCHLORIDE 1 MG: 1 INJECTION, SOLUTION INTRAMUSCULAR; INTRAVENOUS; SUBCUTANEOUS at 05:47

## 2022-10-02 RX ADMIN — VANCOMYCIN HYDROCHLORIDE 1000 MG: 1 INJECTION, POWDER, LYOPHILIZED, FOR SOLUTION INTRAVENOUS at 14:18

## 2022-10-02 RX ADMIN — SODIUM CHLORIDE, PRESERVATIVE FREE 10 ML: 5 INJECTION INTRAVENOUS at 09:00

## 2022-10-02 RX ADMIN — PROMETHAZINE HYDROCHLORIDE 25 MG: 25 INJECTION INTRAMUSCULAR; INTRAVENOUS at 01:10

## 2022-10-02 ASSESSMENT — PAIN DESCRIPTION - ORIENTATION
ORIENTATION: LEFT
ORIENTATION: LEFT

## 2022-10-02 ASSESSMENT — PAIN DESCRIPTION - LOCATION
LOCATION: LEG
LOCATION: LEG

## 2022-10-02 ASSESSMENT — PAIN DESCRIPTION - DESCRIPTORS
DESCRIPTORS: ACHING;BURNING
DESCRIPTORS: BURNING;STABBING

## 2022-10-02 ASSESSMENT — PAIN SCALES - GENERAL
PAINLEVEL_OUTOF10: 10
PAINLEVEL_OUTOF10: 10

## 2022-10-02 NOTE — PROGRESS NOTES
V2.0  [facility] Hospitalist Progress Note      Name:  Katiana Koehler /Age/Sex: 1970  (46 y.o. female)   MRN & CSN:  [id number] & [id number] Encounter Date/Time: 10/2/2022 10:27 AM EDT    Location:  [address] [place], [place] [id number] PCP: No primary care provider on file. Hospital Day: 14    Assessment and Plan:   Katiana Koehler is a 46 y.o. female with left knee pain      Plan:    L knee hemarthrosis  - very painful today, appear slightly better than it did yesterday with regard to swelling  - very important to have compression wrap   - ortho following  - Lovenox reduced to ppx dose  - ortho recommending compression wrap compliance and no intervention at this time    Hypothyroidism  - home dose Synthroid 125  - TSH still elevated to 14  - endo consulted, f/u repeat TSH, T3, T4    Nausea/vomiting - resolved  - stable pneumobilia on CT  - low to no suspicion for cholangitis    DVT   - L distal femoral and popliteal  - had to reduce Lovenox to ppx dosing given hemarthrosis  - may require IVC filter, but will obtain repeat duplex prior to making this decision    Anemia  - blood loss  - s/p 1 unit pRBC    OA  Lupus  Psoriasis  Recent bariatric surgery and delayed wound healing    Ppx: Lovenox  Dispo: TBD    Subjective:     Chief Complaint: No chief complaint on file. Left knee pain. Compression wrap not present, needs to be. Review of Systems:    Review of Systems    10 point ROS negative except as stated above in \"subjective\" section    Objective: Intake/Output Summary (Last 24 hours) at 10/2/2022 1015  Last data filed at 10/1/2022 2210  Gross per 24 hour   Intake 120 ml   Output --   Net 120 ml          Vitals:   Vitals:    10/02/22 0845   BP: (!) 173/98   Pulse: 74   Resp: 19   Temp: 98.1 °F (36.7 °C)   SpO2: 100%       Physical Exam:     General: NAD  Eyes: EOMI  ENT: neck supple  Cardiovascular: Regular rate.   Respiratory: Clear to auscultation  Gastrointestinal: Soft, non tender  Genitourinary: no suprapubic tenderness  Musculoskeletal: left knee swelling and tenderness Sutures from recent arthroscopy noted  Skin: warm, dry  Neuro: Alert. Psych: Mood appropriate. Medications:   Medications:    vancomycin  1,000 mg IntraVENous Q12H    enoxaparin  30 mg SubCUTAneous BID    lidocaine  1 patch TransDERmal Daily    scopolamine  1 patch TransDERmal Q72H    gabapentin  800 mg Oral TID    Vitamin D  1,000 Units Oral Daily    vitamin B-12  500 mcg Oral Daily    miconazole   Topical BID    sodium chloride flush  5-40 mL IntraVENous 2 times per day    ondansetron  4 mg IntraVENous 4 times per day    PARoxetine  30 mg Oral Daily    hydrOXYzine HCl  50 mg Oral BID    levothyroxine  125 mcg Oral Daily    levETIRAcetam  1,000 mg Oral BID    pantoprazole  40 mg Oral BID AC      Infusions:    sodium chloride      sodium chloride 100 mL/hr at 10/01/22 0308     PRN Meds: HYDROmorphone, 0.5 mg, Q3H PRN   Or  HYDROmorphone, 1 mg, Q3H PRN  sodium chloride, , PRN  tiZANidine, 4 mg, Q8H PRN  sodium chloride flush, 5-40 mL, PRN  sodium chloride, , PRN  ondansetron, 4 mg, Q8H PRN   Or  ondansetron, 4 mg, Q6H PRN  polyethylene glycol, 17 g, Daily PRN  acetaminophen, 650 mg, Q6H PRN   Or  acetaminophen, 650 mg, Q6H PRN  promethazine, 25 mg, Q6H PRN      Labs      Recent Results (from the past 24 hour(s))   Vancomycin Level, Random    Collection Time: 10/02/22  5:01 AM   Result Value Ref Range    Vancomycin Rm 34.3 UG/ML    DOSE AMOUNT DOSE AMT. GIVEN - 1250 mg     DOSE TIME DOSE TIME GIVEN - 0300, 1500         Imaging/Diagnostics Last 24 Hours   No results found.     Electronically signed by Juan Francisco Norris MD on 10/2/2022 at 10:15 AM

## 2022-10-03 ENCOUNTER — APPOINTMENT (OUTPATIENT)
Dept: ULTRASOUND IMAGING | Age: 52
DRG: 486 | End: 2022-10-03
Payer: COMMERCIAL

## 2022-10-03 LAB
1,3 BETA-D-GLUCAN INTERP: NEGATIVE
1,3 BETA-D-GLUCAN: 44 PG/ML
ANTICARDIOLIPIN IGA ANTIBODY: <10 APL
ANTICARDIOLIPIN IGG ANTIBODY: <10 GPL
CARDIOLIPIN AB IGM: 18 MPL

## 2022-10-03 PROCEDURE — 97116 GAIT TRAINING THERAPY: CPT

## 2022-10-03 PROCEDURE — 6360000002 HC RX W HCPCS: Performed by: STUDENT IN AN ORGANIZED HEALTH CARE EDUCATION/TRAINING PROGRAM

## 2022-10-03 PROCEDURE — 97535 SELF CARE MNGMENT TRAINING: CPT

## 2022-10-03 PROCEDURE — 2580000003 HC RX 258: Performed by: INTERNAL MEDICINE

## 2022-10-03 PROCEDURE — 2580000003 HC RX 258: Performed by: STUDENT IN AN ORGANIZED HEALTH CARE EDUCATION/TRAINING PROGRAM

## 2022-10-03 PROCEDURE — 76536 US EXAM OF HEAD AND NECK: CPT

## 2022-10-03 PROCEDURE — 97530 THERAPEUTIC ACTIVITIES: CPT

## 2022-10-03 PROCEDURE — 6370000000 HC RX 637 (ALT 250 FOR IP): Performed by: STUDENT IN AN ORGANIZED HEALTH CARE EDUCATION/TRAINING PROGRAM

## 2022-10-03 PROCEDURE — 93971 EXTREMITY STUDY: CPT

## 2022-10-03 PROCEDURE — 99024 POSTOP FOLLOW-UP VISIT: CPT | Performed by: STUDENT IN AN ORGANIZED HEALTH CARE EDUCATION/TRAINING PROGRAM

## 2022-10-03 PROCEDURE — 76882 US LMTD JT/FCL EVL NVASC XTR: CPT

## 2022-10-03 PROCEDURE — 99232 SBSQ HOSP IP/OBS MODERATE 35: CPT | Performed by: INTERNAL MEDICINE

## 2022-10-03 PROCEDURE — 6360000002 HC RX W HCPCS: Performed by: SURGERY

## 2022-10-03 PROCEDURE — 94761 N-INVAS EAR/PLS OXIMETRY MLT: CPT

## 2022-10-03 PROCEDURE — 1200000000 HC SEMI PRIVATE

## 2022-10-03 PROCEDURE — 6370000000 HC RX 637 (ALT 250 FOR IP): Performed by: SURGERY

## 2022-10-03 PROCEDURE — 6370000000 HC RX 637 (ALT 250 FOR IP): Performed by: HOSPITALIST

## 2022-10-03 PROCEDURE — 2500000003 HC RX 250 WO HCPCS: Performed by: HOSPITALIST

## 2022-10-03 PROCEDURE — 6360000002 HC RX W HCPCS: Performed by: INTERNAL MEDICINE

## 2022-10-03 RX ORDER — PROMETHAZINE HYDROCHLORIDE 25 MG/1
25 TABLET ORAL EVERY 6 HOURS PRN
Qty: 20 TABLET | Refills: 0 | Status: SHIPPED | OUTPATIENT
Start: 2022-10-03 | End: 2022-10-11 | Stop reason: ALTCHOICE

## 2022-10-03 RX ORDER — OXYCODONE HYDROCHLORIDE AND ACETAMINOPHEN 5; 325 MG/1; MG/1
1 TABLET ORAL EVERY 4 HOURS PRN
Qty: 20 TABLET | Refills: 0 | Status: SHIPPED | OUTPATIENT
Start: 2022-10-03 | End: 2022-10-10

## 2022-10-03 RX ADMIN — MORPHINE SULFATE 15 MG: 15 TABLET, FILM COATED, EXTENDED RELEASE ORAL at 08:02

## 2022-10-03 RX ADMIN — PANTOPRAZOLE SODIUM 40 MG: 40 TABLET, DELAYED RELEASE ORAL at 15:41

## 2022-10-03 RX ADMIN — DIAZEPAM 5 MG: 5 TABLET ORAL at 15:41

## 2022-10-03 RX ADMIN — LEVETIRACETAM 1000 MG: 500 TABLET, FILM COATED ORAL at 10:11

## 2022-10-03 RX ADMIN — SODIUM CHLORIDE, PRESERVATIVE FREE 10 ML: 5 INJECTION INTRAVENOUS at 20:42

## 2022-10-03 RX ADMIN — ONDANSETRON 4 MG: 2 INJECTION INTRAMUSCULAR; INTRAVENOUS at 17:00

## 2022-10-03 RX ADMIN — LEVOTHYROXINE SODIUM 125 MCG: 25 TABLET ORAL at 05:21

## 2022-10-03 RX ADMIN — Medication 1000 UNITS: at 10:12

## 2022-10-03 RX ADMIN — ONDANSETRON 4 MG: 2 INJECTION INTRAMUSCULAR; INTRAVENOUS at 02:49

## 2022-10-03 RX ADMIN — ENOXAPARIN SODIUM 30 MG: 100 INJECTION SUBCUTANEOUS at 20:42

## 2022-10-03 RX ADMIN — MORPHINE SULFATE 15 MG: 15 TABLET, FILM COATED, EXTENDED RELEASE ORAL at 20:42

## 2022-10-03 RX ADMIN — DIAZEPAM 5 MG: 5 TABLET ORAL at 23:38

## 2022-10-03 RX ADMIN — HYDROXYZINE HYDROCHLORIDE 50 MG: 50 TABLET, FILM COATED ORAL at 20:42

## 2022-10-03 RX ADMIN — VANCOMYCIN HYDROCHLORIDE 1000 MG: 1 INJECTION, POWDER, LYOPHILIZED, FOR SOLUTION INTRAVENOUS at 02:48

## 2022-10-03 RX ADMIN — ONDANSETRON 4 MG: 2 INJECTION INTRAMUSCULAR; INTRAVENOUS at 22:40

## 2022-10-03 RX ADMIN — PANTOPRAZOLE SODIUM 40 MG: 40 TABLET, DELAYED RELEASE ORAL at 05:21

## 2022-10-03 RX ADMIN — PAROXETINE HYDROCHLORIDE 30 MG: 20 TABLET, FILM COATED ORAL at 10:12

## 2022-10-03 RX ADMIN — MICONAZOLE NITRATE: 2 POWDER TOPICAL at 10:21

## 2022-10-03 RX ADMIN — LEVETIRACETAM 1000 MG: 500 TABLET, FILM COATED ORAL at 20:42

## 2022-10-03 RX ADMIN — CYANOCOBALAMIN TAB 1000 MCG 500 MCG: 1000 TAB at 10:12

## 2022-10-03 RX ADMIN — VANCOMYCIN HYDROCHLORIDE 1000 MG: 1 INJECTION, POWDER, LYOPHILIZED, FOR SOLUTION INTRAVENOUS at 15:47

## 2022-10-03 RX ADMIN — ENOXAPARIN SODIUM 30 MG: 100 INJECTION SUBCUTANEOUS at 10:11

## 2022-10-03 RX ADMIN — DIAZEPAM 5 MG: 5 TABLET ORAL at 01:21

## 2022-10-03 RX ADMIN — HYDROMORPHONE HYDROCHLORIDE 1 MG: 1 INJECTION, SOLUTION INTRAMUSCULAR; INTRAVENOUS; SUBCUTANEOUS at 10:12

## 2022-10-03 RX ADMIN — GABAPENTIN 800 MG: 400 CAPSULE ORAL at 10:12

## 2022-10-03 RX ADMIN — HYDROXYZINE HYDROCHLORIDE 50 MG: 50 TABLET, FILM COATED ORAL at 10:12

## 2022-10-03 RX ADMIN — GABAPENTIN 800 MG: 400 CAPSULE ORAL at 20:42

## 2022-10-03 RX ADMIN — SODIUM CHLORIDE, PRESERVATIVE FREE 10 ML: 5 INJECTION INTRAVENOUS at 10:12

## 2022-10-03 RX ADMIN — HYDROMORPHONE HYDROCHLORIDE 1 MG: 1 INJECTION, SOLUTION INTRAMUSCULAR; INTRAVENOUS; SUBCUTANEOUS at 22:40

## 2022-10-03 RX ADMIN — GABAPENTIN 800 MG: 400 CAPSULE ORAL at 14:00

## 2022-10-03 RX ADMIN — HYDROMORPHONE HYDROCHLORIDE 1 MG: 1 INJECTION, SOLUTION INTRAMUSCULAR; INTRAVENOUS; SUBCUTANEOUS at 17:01

## 2022-10-03 RX ADMIN — ONDANSETRON 4 MG: 2 INJECTION INTRAMUSCULAR; INTRAVENOUS at 14:00

## 2022-10-03 ASSESSMENT — PAIN DESCRIPTION - LOCATION
LOCATION: LEG

## 2022-10-03 ASSESSMENT — PAIN DESCRIPTION - DESCRIPTORS
DESCRIPTORS: THROBBING
DESCRIPTORS: ACHING;BURNING
DESCRIPTORS: ACHING;BURNING
DESCRIPTORS: ACHING;BURNING;THROBBING
DESCRIPTORS: THROBBING;TENDER

## 2022-10-03 ASSESSMENT — PAIN DESCRIPTION - ORIENTATION
ORIENTATION: LEFT

## 2022-10-03 ASSESSMENT — PAIN SCALES - GENERAL
PAINLEVEL_OUTOF10: 8
PAINLEVEL_OUTOF10: 9
PAINLEVEL_OUTOF10: 8
PAINLEVEL_OUTOF10: 9
PAINLEVEL_OUTOF10: 8
PAINLEVEL_OUTOF10: 8

## 2022-10-03 ASSESSMENT — PAIN DESCRIPTION - PAIN TYPE
TYPE: ACUTE PAIN

## 2022-10-03 ASSESSMENT — ENCOUNTER SYMPTOMS
VOMITING: 0
SORE THROAT: 0
COUGH: 0
BACK PAIN: 0
WHEEZING: 0
VOICE CHANGE: 0
NAUSEA: 0
COLOR CHANGE: 0
SHORTNESS OF BREATH: 0

## 2022-10-03 ASSESSMENT — PAIN - FUNCTIONAL ASSESSMENT
PAIN_FUNCTIONAL_ASSESSMENT: ACTIVITIES ARE NOT PREVENTED
PAIN_FUNCTIONAL_ASSESSMENT: ACTIVITIES ARE NOT PREVENTED
PAIN_FUNCTIONAL_ASSESSMENT: PREVENTS OR INTERFERES SOME ACTIVE ACTIVITIES AND ADLS
PAIN_FUNCTIONAL_ASSESSMENT: ACTIVITIES ARE NOT PREVENTED
PAIN_FUNCTIONAL_ASSESSMENT: PREVENTS OR INTERFERES SOME ACTIVE ACTIVITIES AND ADLS

## 2022-10-03 ASSESSMENT — PAIN DESCRIPTION - FREQUENCY
FREQUENCY: CONTINUOUS
FREQUENCY: CONTINUOUS

## 2022-10-03 ASSESSMENT — PAIN DESCRIPTION - ONSET
ONSET: ON-GOING
ONSET: ON-GOING

## 2022-10-03 NOTE — PROGRESS NOTES
Infectious Disease Progress Note  10/3/2022   Patient Name: Nina Sierra : 1970     Assessment  Possible left knee septic arthritis: Afebrile, CRP is within normal limits. Status post left knee arthroscopic irrigation and debridement with drain placement on 2022. Cultures have remained negative. Initially not thought to be septic arthritis, however, patient continues pain in the knee which may be seen with hemarthrosis. 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 Had 3 loose BM on 2022. Unlikely to be colorectal.  Treated with single dose of doxycycline 30 mg on 2022. Acute DVT in left distal femoral and popliteal veins  Obesity BMI  40 .85 kg/m2      Plan  Therapeutic: IV vancomycin started on 22. (End date: 10/27/2022). Dose is being determined. After discharge the following should be done:  Weekly labs drawn on Monday during the course of treatment  CBC with differential, CMP, ESR, CRP,,Vancomycin Trough  IV vancomycin pharmacy to dose  Cathflo for blocked vascular access as needed  Fax results to Attn: [facility] Staff  # [phone]  See in clinic within one week after discharge  Disposition: Home with antibiotic  Diagnostic: . F/u:  Other:        Reason for visit: F/u left knee swelling  History:? Interval history noted  Continues to have pain in her left knee. Physical Exam:  Vital Signs: BP (!) 150/88   Pulse 73   Temp 98.8 °F (37.1 °C) (Oral)   Resp 17   Ht 5' 4\" (1.626 m)   Wt 238 lb 1.6 oz (108 kg)   SpO2 100%   BMI 40.87 kg/m²     Gen: alert and oriented X3, no distress  Skin: no stigmata of endocarditis  Wounds: Left knee dressing C/D/I  Chest: no distress and CTA. Good air movement. Heart: RRR and no MRG. Abd: soft, non-distended, no tenderness, no hepatomegaly.  Normoactive bowel sounds. Ext: left prepatellar effusion      Radiologic / Imaging / TESTING  No results found. Labs:    Recent Results (from the past 24 hour(s))   Sedimentation Rate    Collection Time: 10/02/22  5:05 PM   Result Value Ref Range    Sed Rate 34 (H) 0 - 30 MM/HR   C-Reactive Protein    Collection Time: 10/02/22  5:05 PM   Result Value Ref Range    CRP, High Sensitivity 9.9 (H) 0.0 - 5.0 mg/L   T3, Free    Collection Time: 10/02/22  5:05 PM   Result Value Ref Range    T3, Free 2.0 (L) 2.3 - 4.2 PG/ML   T4, Free    Collection Time: 10/02/22  5:05 PM   Result Value Ref Range    T4 Free 1.31 0.9 - 1.8 NG/DL   TSH    Collection Time: 10/02/22  5:05 PM   Result Value Ref Range    TSH, High Sensitivity 3.430 0.270 - 4.20 uIu/ml       CULTURE results: Invalid input(s): BLOOD CULTURE,  URINE CULTURE, SURGICAL CULTURE    Diagnosis:  Patient Active Problem List   Diagnosis    Acute bilateral deep vein thrombosis (DVT) of femoral veins (HCC)    Acute deep vein thrombosis (DVT) of popliteal vein of left lower extremity (HCC)    Swelling of joint of left knee       Active Problems  Principal Problem:    Acute bilateral deep vein thrombosis (DVT) of femoral veins (HCC)  Active Problems:    Acute deep vein thrombosis (DVT) of popliteal vein of left lower extremity (HCC)    Swelling of joint of left knee  Resolved Problems:    * No resolved hospital problems.  *              Electronically signed by: Electronically signed by Oneyda Quigley MD on 10/3/2022 at 12:45 PM

## 2022-10-03 NOTE — PROGRESS NOTES
V2.0  [facility] [place] [facility] Progress Note      Name:  Raegan Torres /Age/Sex: 1970  (46 y.o. female)   MRN & CSN:  [id number] & [id number] Encounter Date/Time: 10/3/2022 10:27 AM EDT    Location:  [address] [place] [address] PCP: No primary care provider on file. Hospital Day: 15    Assessment and Plan:   Raegan Torres is a 46 y.o. female with left knee pain      Plan: Patient can be discharged morning of 10/4. Med rec and narcotic script provided. Discharge order timed for 10/4 AM is in.    L knee hemarthrosis - improving  - very painful today, appear slightly better than it did yesterday with regard to swelling  - very important to have compression wrap and ice pack  - ortho signed off, low suspicion for septic joint per their assessment  - will reach out to ID regarding possible d/c abx  - Lovenox reduced to ppx dose; spoke to hematology regarding pause in [facility] up to two weeks to let knee heal, f/u their office in 1 week to monitor for DVT recurrence  - ortho recommending compression wrap compliance and no intervention at this time    Hypothyroidism  - home dose Synthroid 125  - TSH still elevated to 14  - endo consulted, f/u repeat TSH, T3, T4    Nausea/vomiting - resolved  - stable pneumobilia on CT  - low to no suspicion for cholangitis    DVT   - L distal femoral and popliteal  - had to reduce Lovenox to ppx dosing given hemarthrosis  - may require IVC filter, but will obtain repeat duplex prior to making this decision    Anemia  - blood loss  - s/p 1 unit pRBC    OA  Lupus  Psoriasis  Recent bariatric surgery and delayed wound healing    Ppx: Lovenox  Dispo: TBD    Subjective:     Chief Complaint: No chief complaint on file. Left knee pain. Compression wrap. Ice pack. Will require 4 weeks vancomycin via port on discharge. Hold AC for 2 weeks, f/u with heme and ID in one week.           Review of Systems:    Review of Systems    10 point ROS negative except as stated above in \"subjective\" section    Objective: Intake/Output Summary (Last 24 hours) at 10/3/2022 1047  Last data filed at 10/2/2022 2025  Gross per 24 hour   Intake 60 ml   Output 50 ml   Net 10 ml          Vitals:   Vitals:    10/03/22 1000   BP: (!) 150/88   Pulse: 73   Resp: 17   Temp: 98.8 °F (37.1 °C)   SpO2: 100%       Physical Exam:     General: NAD  Eyes: EOMI  ENT: neck supple  Cardiovascular: Regular rate. Respiratory: Clear to auscultation  Gastrointestinal: Soft, non tender  Genitourinary: no suprapubic tenderness  Musculoskeletal: left knee swelling and tenderness Sutures from recent arthroscopy noted  Skin: warm, dry  Neuro: Alert. Psych: Mood appropriate.      Medications:   Medications:    vancomycin  1,000 mg IntraVENous Q12H    morphine  15 mg Oral 2 times per day    enoxaparin  30 mg SubCUTAneous BID    lidocaine  1 patch TransDERmal Daily    scopolamine  1 patch TransDERmal Q72H    gabapentin  800 mg Oral TID    Vitamin D  1,000 Units Oral Daily    vitamin B-12  500 mcg Oral Daily    miconazole   Topical BID    sodium chloride flush  5-40 mL IntraVENous 2 times per day    ondansetron  4 mg IntraVENous 4 times per day    PARoxetine  30 mg Oral Daily    hydrOXYzine HCl  50 mg Oral BID    levothyroxine  125 mcg Oral Daily    levETIRAcetam  1,000 mg Oral BID    pantoprazole  40 mg Oral BID AC      Infusions:    sodium chloride      sodium chloride 100 mL/hr at 10/01/22 0308     PRN Meds: HYDROmorphone, 0.5 mg, Q6H PRN   Or  HYDROmorphone, 1 mg, Q6H PRN  diazePAM, 5 mg, Q8H PRN  sodium chloride, , PRN  tiZANidine, 4 mg, Q8H PRN  sodium chloride flush, 5-40 mL, PRN  sodium chloride, , PRN  ondansetron, 4 mg, Q8H PRN   Or  ondansetron, 4 mg, Q6H PRN  polyethylene glycol, 17 g, Daily PRN  acetaminophen, 650 mg, Q6H PRN   Or  acetaminophen, 650 mg, Q6H PRN  promethazine, 25 mg, Q6H PRN      Labs      Recent Results (from the past 24 hour(s))   Sedimentation Rate    Collection Time: 10/02/22  5:05 PM Result Value Ref Range    Sed Rate 34 (H) 0 - 30 MM/HR   C-Reactive Protein    Collection Time: 10/02/22  5:05 PM   Result Value Ref Range    CRP, High Sensitivity 9.9 (H) 0.0 - 5.0 mg/L   T3, Free    Collection Time: 10/02/22  5:05 PM   Result Value Ref Range    T3, Free 2.0 (L) 2.3 - 4.2 PG/ML   T4, Free    Collection Time: 10/02/22  5:05 PM   Result Value Ref Range    T4 Free 1.31 0.9 - 1.8 NG/DL   TSH    Collection Time: 10/02/22  5:05 PM   Result Value Ref Range    TSH, High Sensitivity 3.430 0.270 - 4.20 uIu/ml        Imaging/Diagnostics Last 24 Hours   No results found.     Electronically signed by Dc Londono MD on 10/3/2022 at 10:47 AM

## 2022-10-03 NOTE — PROGRESS NOTES
instruction for safe body positioning c RW during ADL tasks and verbalized understanding, required occasional cues in function. Self Care Training:   Cues were given for safety, sequence, UE/LE placement, visual cues, and balance. SBA for toilet transfers / toileting. SBA c RW in stance at sink for hand hygiene. LB Dressing: SBA to doff L  sock c leg outstretched on bed    All therapeutic intervention performed c emphasis on dynamic balance / standing tolerance to inc strength, endurance and act tolerance for inc Indep c ADL tasks, func transfers / mobility. Safety  Patient safely in bed + alarm set at end of session, with call light/phone in reach, and nursing aware. Gait belt was used for func transfers / mobility. Assessment / Impression:    Patient's tolerance of treatment: Well  Adverse Reaction: None  Significant change in status and impact: Improved from previous session  Barriers to improvement: Pain      Plan for Next Session:    Continue per OT POC per patient's tolerance c emphasis on functional transfers / mobility during toileting ADLs.     Time in:  1422  Time out:  1501  Timed treatment minutes:  39  Total treatment time:  39      Electronically signed by:    LELAND Armendariz  10/3/2022, 2:22 PM    Goals:  Time frame for goals: 2 weeks  Pt will complete feeding tasks with independence at seated level   Pt will complete grooming tasks with independence at standing level   Pt will complete toileting tasks with independence using ETS and grab bars   Pt will complete UB dressing tasks with independence seated EOB   Pt will complete LB dressing tasks with independence seated EOB   Pt will complete UB bathing tasks with Supervision while seated and AE PRN   Pt will complete LB bathing tasks with Supervision while seated and AE PRN   Pt will complete therapeutic exercise/activity to increase independence in ADL/IADL function  Pt will practice functional transfers and mobility with AD for increased safety and independence

## 2022-10-03 NOTE — CARE COORDINATION
[facility] made aware of pt going home on 6 weeks of Vancomycin. Once this RN CM has written antibx script, I will fax it to [facility]. Possible DC tomorrow to home. Should be 4 weeks of vancomycin, not 6.

## 2022-10-03 NOTE — PROGRESS NOTES
Comprehensive Nutrition Assessment    Type and Reason for Visit:  Reassess    Nutrition Recommendations/Plan:   Continue current diet as ordered  Please document all po intake  Will closely monitor po intake, weight trends, poc     Nutrition Assessment:    pt remains on regular diet, no oral nutrition supplements ordered at this time, no po intake documented in the past 72 hr, noted plan for d/c tomorrow, will continue to follow at moderate nutrition risk    Nutrition Related Findings:      Wound Type: None       Current Nutrition Intake & Therapies:    Average Meal Intake: Unable to assess  Average Supplements Intake: None Ordered  ADULT DIET; Regular    Anthropometric Measures:  Height: 5' 4.02\" (162.6 cm)  Ideal Body Weight (IBW): 120 lbs (55 kg)    Current Body Weight: 238 lb 1.6 oz (108 kg), 198.4 % IBW. Weight Source: Bed Scale  Current BMI (kg/m2): 40.8  Weight Adjustment For: No Adjustment  BMI Categories: Obese Class 3 (BMI 40.0 or greater)    Nutrition Diagnosis:   No nutrition diagnosis at this time     Nutrition Interventions:   Food and/or Nutrient Delivery: Continue Current Diet  Nutrition Education/Counseling: No recommendation at this time  Coordination of Nutrition Care: Continue to monitor while inpatient    Goals:  Previous Goal Met: Progressing toward Goal(s)  Goals: PO intake 75% or greater, by next RD assessment  Nutrition Monitoring and Evaluation:   Behavioral-Environmental Outcomes: None Identified  Food/Nutrient Intake Outcomes: Food and Nutrient Intake  Physical Signs/Symptoms Outcomes: Biochemical Data, GI Status, Hemodynamic Status, Fluid Status or Edema, Weight, Skin    Discharge Planning:     Too soon to determine     Nyasia Wei [address], [address],   Contact: [phone]

## 2022-10-03 NOTE — PROGRESS NOTES
Physical Therapy    Physical Therapy Treatment Note  Name: Todd Oneal MRN: [id number] :   1970   Date:  10/3/2022   Admission Date: 2022 Room:  [address] [place] [address]   Restrictions/Precautions:  fall risk; general precautions; contact   Subjective:  Patient states:  \"I think I'm going to recover so much better at home\"  Pain:   Location, Type, Intensity (0/10 to 10/10):  L knee pain that she does not rate  Objective:    Observation:  Supine, awake, alert, agreeable. She remains pain focused, anxious, and adamant on remaining as mobile as possible ; having difficulty resting and sleeping 2/2 pain. Pocket tele in place. Treatment, including education/measures:  Therapeutic Activity Training:   Therapeutic activity training was instructed today. Cues were given for safety, sequence, UE/LE placement, awareness, and balance. Activities performed today included bed mobility training, sup-sit, sit-stand, SPT. Supine <-> sit: SBA  Seated balance: good  Sit <-> stand: SBA ; L knee in more extended position, relying on R LE heavily for transfer completion  Standing balance: fair + ; intermittent unsupported standing  Stand <-> sit: SBA  Positioned for comfort and pressure relief ; all needs met, left in chair, call light in reach    Gait Training:  Cues were given for safety, sequence, device management, balance, posture, awareness, path. 200 ft using FWW at SBA ; dec viviana, antalgia during LLE WB, inc BL UE reliance to improve weight acceptance, waddling in quality ; endurance and pain limiting distance    Assessment / Impression:    Pt tolerating OOB well, anxious tendencies and pain limiting. She is motivated to return to PLOF and inc endurance ; swelling and ROM improving, but cont to impact functional mobility. Safety is adequate for home going and pt is planning on stay w/ friend in more accessible environment to help ease transition to inc independence.      Patient's tolerance of treatment:  fair +  Barriers to improvement:  pain ; weakness;  swelling; anxious tendencies; comorbid conditions  Plan for Next Session:    Cont w/ est DC plan (home w/ assist PRN +  PT)  Progress functional mobility, functional strength, ROM activities, swelling mgmt activities, ambulation distance    Time in:  1133  Time out:  1200  Timed treatment minutes:  24 (TA, GT)  Total treatment time:  32    Previously filed items:  Social/Functional History  Lives With: Family (brothers following surgery)  Type of Home: House  Home Layout: One level  Home Access: Stairs to enter with rails  Entrance Stairs - Number of Steps: 2  Entrance Stairs - Rails: Left  Bathroom Shower/Tub: Tub/Shower unit  Bathroom Toilet: Standard  Home Equipment: Crutches  Has the patient had two or more falls in the past year or any fall with injury in the past year?: No  Receives Help From: Family  ADL Assistance: [address]: Independent  Homemaking Responsibilities: Yes  Ambulation Assistance: Independent  Transfer Assistance: Independent  Active : Yes  Occupation: On disability  Patient Goals   Patient Goals : return to [address] [place] [address]  Time Frame for Short Term Goals: 1 week  Short Term Goal 1: pt [facility] complete [facility] [address] 2: pt will complete transfers at sup level  Short Term Goal 3: pt will ambulate 100 ft using LRAD at sup level    Electronically signed by:    Santiago Vera PT, DPT  10/3/2022, 12:23 PM

## 2022-10-03 NOTE — PROGRESS NOTES
Justine Maza (1970)    Daily Progress Note-  Keyon RodriguezDO,                  Today's Date:   10/3/2022          Subjective:     Patient seen and examined, seen in bed this morning. Patient states her pain is well controlled this morning and doing much better than was over the weekend. No new injuries or complaints in regards to the knee. She denies any constitutional symptoms or any additional orthopedic complaints this time. Currently in compressive dressing without issue. I was contacted over the weekend about this patient. I was out of town by partner was able to see her. She was having increased pain in the left knee. Per the hospitalist, there was concern for hemorrhagic effusion and they had inquired about us performing another aspiration and possibly taking him to the OR for a washout of any type of remaining blood. I explained that this is not something we would typically do and that I recently performed an aspiration that consisted mainly of clotted blood and I did not feel that another 1 was appropriate. I explained that additional aspiration would likely not yield significant fluid due to the clotting and would just put her at higher risk for infection. The hospitalist did also confirm that she has not been wearing her compressive dressing. My partner saw the patient later that day and did confirm that there is no concerning findings on examination and that the knee swelling did appear to be hemorrhagic in nature. ESR and CRP were obtained and both continue to show downward trend compared to previous testing. Objective:     Vitals:    10/03/22 0802   BP:    Pulse:    Resp: 18   Temp:    SpO2:         Review of Systems   Constitutional:  Positive for activity change. Negative for fatigue and fever.    HENT:  Negative for sneezing, sore throat and voice change. Respiratory:  Negative for cough, shortness of breath and wheezing. Cardiovascular:  Negative for leg swelling. Gastrointestinal:  Negative for nausea and vomiting. Musculoskeletal:  Positive for arthralgias, joint swelling and myalgias. Negative for back pain, gait problem, neck pain and neck stiffness. Skin:  Negative for color change, rash and wound. Neurological:  Negative for weakness and numbness. Psychiatric/Behavioral:  Negative for behavioral problems, confusion and self-injury. Physical Exam:     The patient is awake and alert  Resting comfortably in bed    Operative extremity:    Dressing clean, dry, intact with compression Ace wrap over top no sign of bleeding from the medial or lateral portal.  After sterilely cleansing around the portal sites with both Betadine and alcohol, the sutures were removed without issue and patient tolerated this well. There is no active bleeding from these areas  Mild effusion that is palpable with no erythema, warmth. Minimally tender to palpation no crepitance with gentle range of motion  Sensation and motor function intact distally. Patient wiggles toes without difficulty  Patient does have improved bruising on posterior thigh but none at the anterior thigh   no pain in joint above or below  Guarded range of motion of knee at this time, improved from when she was seen previously and from what my partner states she had yesterday. Continued pain with active and passive flexion but active motion is intact  Compartments soft, compressible  Positive Petar's which is unchanged from yesterday  Knee active range of motion intact.   Patient is able to perform straight leg raise with no significant lag      LABS   CBC:   Recent Labs     09/30/22  1155 10/01/22  0638   WBC 9.6 7.0   HGB 10.1* 10.0*    373       CRP downtrending (9.9 on 10/2/2022 compared to 19.7 on 9/27/2022)  ESR downtrending (34 on 10/2/2022 compared to 60 on 9/24/2022)      Aspiration performed 9- demonstrates:  Unable to obtain RBC and WBC count due to clotting. Neutrophils 89%. Continued no crystals seen. No growth on cultures    Preoperative right knee aspiration results - No change    Culture-aerobic and anaerobic demonstrate:   Prelim Report No growth to date No growth 36 to 48 hours No Aerobic or Anaerobic growth Holding for 3 weeks. Anaerobic culture further report to follow No anaerobes isolated so far, Further report to follow        Awaiting Gram stain    Crystals: NO CRYSTALS SEEN     Intraoperative fluid collection demonstrates:  Prelim Report No growth to date No growth 36 to 48 hours P with either collection    ESR, CRP improved but remains elevated (19.7 9/27 compared to 138.7 on 9/24/2022)    IMAGING   No new orthopedic imaging    6 views of left knee demonstrate:     No joint effusion. No acute fracture. Small tricompartmental osteophytes   with mild medial compartment joint space loss. Remaining bones and joint   spaces are maintained. Assessment and Plan     1. POD #13 s/p left knee arthroscopic irrigation and debridement    1:  Physical therapy consult for mobilization   -WBAT   -No precautions with the exception of no deep knee bending and no soaking of the wound. 2:  DVT prophylaxis   -Continue DVT prophylaxis per hospitalist  3:  Continue Pain Control   -wean to PO meds  4. Medical management per hospitalist service   -Continue to monitor Hgb.     -Continue antibiotic treatment per their plan -currently on vancomycin   -No recommendations at this time from myself about specific antibiotics   -Per their notes, it appears that infectious disease has edited their plan and are coming to proceed with outpatient antibiotics due to risks versus benefits assessment 5:  D/C Planning:     -Per case management  6. Pain better controlled this morning  7. Please maintain compressive dressing.   Please continue ice on the knee as well to help with swelling. I spoke to both the nurse and patient about this  8. Currently no further orthopedic intervention plan at this time. No concern for septic effusion. We will plan on seeing the patient in office in 2 weeks for reevaluation. I explained to the patient that we will be out of town beginning tomorrow evening and therefore will not be able to see her until the following week after this. If there becomes any concerning findings please contact me or which ever of my partners that is on-call. Orthopedics again signing off at this time.        Milana Kidd, DO

## 2022-10-03 NOTE — CONSULTS
Endocrinology   Consult Note  9/19/2022 12:26 PM     Primary Care provider: No primary care provider on file. Referring physician:  No admitting provider for patient encounter. Dear Doctor  Reece Green     Thank You for the Consult     Pt. Was Admitted for : Acute bilateral DVT femoral vein    Reason for Consult: Better control of thyroid function      History Obtained From:  Patient/ EMR       HISTORY OF PRESENT ILLNESS:                The patient is a 46 y.o. female with significant past medical history of arthritis, depression, tension, immune deficiency disorder, history of kidney stone, history of seizure and hypothyroidism was admitted this time for bilateral acute femoral vein thrombosis. Patient also history of pancreatitis chronic or recurrent and has a high IV port for drawing bloods and giving IV medicines as needed. Patient noted to have very high TSH levels so was consulted to evaluate her thyroid functions. I was  consulted for better control of blood glucose. Is an estimated treatable the patient has history of hypothyroidism for a long time. She has been on Synthroid a bit different doses. Currently she takes 125 mcg of Synthroid daily. Patient has not missed her Synthroid dose in the in the last few weeks to days. She has been very consistent in taking her medicines. ROS:   Pt's ROS done in detail. Abnormal ROS are noted in Medical and Surgical History Section below:      Other Medical History:        Diagnosis Date    Arthritis     Depression     Disease of blood and blood forming organ     Hypertension     Immune deficiency disorder ([address]     Kidney stone     Seizures ([address]     Thyroid disease      Surgical History:        Procedure Laterality Date    ABDOMEN SURGERY      APPENDECTOMY      CHOLECYSTECTOMY      HERNIA REPAIR      HYSTERECTOMY (CERVIX STATUS UNKNOWN)      KNEE ARTHROSCOPY Left 9/20/2022    LEFT KNEE ARTHROSCOPIC IRRIGATION AND DEBRIDEMENT WITH DRAIN PLACEMENT performed by Chitra Dudley DO at [address]         Allergies:  Haldol [haloperidol], Asa [aspirin], Compazine [prochlorperazine], Reglan [metoclopramide], and Toradol [ketorolac tromethamine]    Family History:   History reviewed. No pertinent family history. REVIEW OF SYSTEMS:  Review of System Done as noted above     PHYSICAL EXAM:      Vitals:    BP (!) 155/102   Pulse 73   Temp 97.3 °F (36.3 °C)   Resp 18   Ht 5' 4\" (1.626 m)   Wt 238 lb 1.6 oz (108 kg)   SpO2 100%   BMI 40.87 kg/m²     CONSTITUTIONAL:  awake, alert, cooperative, appears stated age   EYES:  vision intact Fundoscopic Exam not performed   ENT:Normal  NECK:  Supple, No JVD. Thyroid Exam: Thyroid megaly LUNGS:  Has Vesicular Breath Sounds,   CARDIOVASCULAR:  Normal apical impulse, regular rate and rhythm, normal S1 and S2, no S3 or S4, and has no  murmur   ABDOMEN:  No scars, normal bowel sounds, soft, non-distended, non-tender, no masses palpated, no hepatolienomegaly  Musculoskeletal: Normal  Extremities: Normal, peripheral pulses normal, , has no edema mild tenderness in both calf muscles and thighs. NEUROLOGIC:  Awake, alert, oriented to name, place and time. Cranial nerves II-XII are grossly intact. Motor is  intact. Sensory is intact. ,  and gait is normal.    DATA:    CBC:   Recent Labs     09/30/22  1155 10/01/22  0638   WBC 9.6 7.0   HGB 10.1* 10.0*    373    CMP:  Recent Labs     10/01/22  0550      K 4.4      CO2 28   BUN 15   CREATININE 0.8   CALCIUM 10.2     Lipids: No results found for: CHOL, HDL, TRIG  Glucose: No results for input(s): POCGLU in the last 72 hours.   Hemoglobin A1C: No results found for: LABA1C  Free T4:   Lab Results   Component Value Date/Time    T4FREE 1.31 10/02/2022 05:05 PM     Free T3:   Lab Results   Component Value Date/Time    FT3 2.0 10/02/2022 05:05 PM     TSH High Sensitivity:   Lab Results   Component Value Date/Time    TSHHS 3.430 10/02/2022 05:05 PM       XR KNEE LEFT (MIN 4 VIEWS)   Final Result   No acute abnormality. Mild osteoarthritis         XR CHEST PORTABLE   Final Result   No acute process. Cardiomegaly. XR KNEE LEFT (1-2 VIEWS)   Final Result   No acute osseous abnormality of the left knee. VL DUP LOWER EXTREMITY VENOUS LEFT   Final Result   1. Nonocclusive thrombus in the left distal femoral and popliteal veins. 2. Anechoic suprapatellar fluid collection may represent joint effusion or   fluid within the prepatellar bursa. Findings were discussed with CHARLIE Gusman at 4:34 pm on 9/19/2022. US EXTREMITY LEFT NON VASC LIMITED   Final Result   1. Nonocclusive thrombus in the left distal femoral and popliteal veins. 2. Anechoic suprapatellar fluid collection may represent joint effusion or   fluid within the prepatellar bursa. Findings were discussed with CHARLIE Gusman at 4:34 pm on 9/19/2022. CT ABDOMEN PELVIS W IV CONTRAST Additional Contrast? None   Final Result   1. Status post Jet-en-Y gastric bypass. Gastric pouch is mildly distended   with ingested contents but is otherwise nonspecific. This appears slightly   more distended when compared to recent CTs and could be related to recent   meal. The esophagus and Jet limb is normal in caliber. 2. Stable pneumobilia. 3. No other acute abdominal or pelvic abnormality. 4. Punctate nonobstructing left renal calculus.          VL DUP LOWER EXTREMITY VENOUS LEFT    (Results Pending)          Scheduled Medicines   Medications:    vancomycin  1,000 mg IntraVENous Q12H    morphine  15 mg Oral 2 times per day    enoxaparin  30 mg SubCUTAneous BID    lidocaine  1 patch TransDERmal Daily    scopolamine  1 patch TransDERmal Q72H    gabapentin  800 mg Oral TID    Vitamin D  1,000 Units Oral Daily    vitamin B-12  500 mcg Oral Daily    miconazole   Topical BID    sodium chloride flush  5-40 mL IntraVENous 2 times per day ondansetron  4 mg IntraVENous 4 times per day    PARoxetine  30 mg Oral Daily    hydrOXYzine HCl  50 mg Oral BID    levothyroxine  125 mcg Oral Daily    levETIRAcetam  1,000 mg Oral BID    pantoprazole  40 mg Oral BID AC      Infusions:    sodium chloride      sodium chloride 100 mL/hr at 10/01/22 0308         IMPRESSION    Patient Active Problem List   Diagnosis    Acute bilateral deep vein thrombosis (DVT) of femoral veins (HCC)    Acute deep vein thrombosis (DVT) of popliteal vein of left lower extremity (HCC)    Swelling of joint of left knee         RECOMMENDATIONS:      Reviewed POC blood glucose . Labs and X ray results   Reviewed Home and Current Medicines   The most recent thyroid function test.  Patient has normal TSH level now. I  Do not believe that patient needs to be given any higher dose than 125. Earlier TSH that was high probably may not be accurate. Waiting for the repeat of thyroid function test done last night before we change the Synthroid dose     Will follow with you  Again thank you for sharing pt's care with me.      Truly yours,       Tavares Villanueva MD

## 2022-10-03 NOTE — PROGRESS NOTES
[address] [facility]  consulted by Dr. Jorge Drew for monitoring and adjustment. Indication for treatment: Bone and Joint Infection  Goal trough: [] 10-15 mcg/mL or [x] 15-20 mcg/ml  AUC/GLORIA: [] <500 or [x] 400-600    Pertinent Laboratory Values:   Temp Readings from Last 3 Encounters:   10/03/22 98.8 °F (37.1 °C) (Oral)     Recent Labs     09/30/22  1155 10/01/22  0638   WBC 9.6 7.0     Recent Labs     10/01/22  0550   BUN 15   CREATININE 0.8     Estimated Creatinine Clearance: 98 mL/min (based on SCr of 0.8 mg/dL). Intake/Output Summary (Last 24 hours) at 10/3/2022 1110  Last data filed at 10/2/2022 2025  Gross per 24 hour   Intake 60 ml   Output 50 ml   Net 10 ml     Vancomycin level:   TROUGH:  No results for input(s): VANCOTROUGH in the last 72 hours. RANDOM:    Recent Labs     10/02/22  0501   VANCORANDOM 34.3     Assessment:  SCr, BUN, and urine output: SCr @ 0.8 (baseline 0.6) (new labs for tomorrow)  Day(s) of therapy: 4 (duration of therapy to be determined)  Vancomycin concentration:   10/2 - 34.3, 4 hours post-dose, AUC > 600    Plan:  Vancomycin 1000mg IVPB every 12 hours. Predicted  and Trough 14.4 at steady state  Pharmacy will continue to monitor patient and adjust therapy as indicated    Apoorva 3 10/4  @ 0600. Thank you for the consult.   Dm Wilson [facility]  10/3/2022 11:10 AM

## 2022-10-04 LAB
ALBUMIN SERPL-MCNC: 3.3 GM/DL (ref 3.4–5)
ANION GAP SERPL CALCULATED.3IONS-SCNC: 6 MMOL/L (ref 4–16)
BETA 2 GLYCOPROT.1 IGA AB: <10 SAU
BETA 2 GLYCOPROT.1 IGM AB: <10 SMU
BETA-2 GLYCOPROTEIN 1 IGG ANTIBODY: <10 SGU
BUN BLDV-MCNC: 16 MG/DL (ref 6–23)
CALCIUM SERPL-MCNC: 9.2 MG/DL (ref 8.3–10.6)
CHLORIDE BLD-SCNC: 103 MMOL/L (ref 99–110)
CO2: 27 MMOL/L (ref 21–32)
CREAT SERPL-MCNC: 0.7 MG/DL (ref 0.6–1.1)
DOSE AMOUNT: NORMAL
DOSE AMOUNT: NORMAL
DOSE TIME: NORMAL
DOSE TIME: NORMAL
GFR AFRICAN AMERICAN: >60 ML/MIN/1.73M2
GFR NON-AFRICAN AMERICAN: >60 ML/MIN/1.73M2
GLUCOSE BLD-MCNC: 130 MG/DL (ref 70–99)
PHOSPHORUS: 3.1 MG/DL (ref 2.5–4.9)
POTASSIUM SERPL-SCNC: 3.6 MMOL/L (ref 3.5–5.1)
REASON FOR REJECTION: NORMAL
REJECTED TEST: NORMAL
SODIUM BLD-SCNC: 136 MMOL/L (ref 135–145)
VANCOMYCIN RANDOM: 15 UG/ML
VANCOMYCIN RANDOM: 58.6 UG/ML

## 2022-10-04 PROCEDURE — 6360000002 HC RX W HCPCS: Performed by: STUDENT IN AN ORGANIZED HEALTH CARE EDUCATION/TRAINING PROGRAM

## 2022-10-04 PROCEDURE — 86800 THYROGLOBULIN ANTIBODY: CPT

## 2022-10-04 PROCEDURE — 6370000000 HC RX 637 (ALT 250 FOR IP): Performed by: HOSPITALIST

## 2022-10-04 PROCEDURE — 94761 N-INVAS EAR/PLS OXIMETRY MLT: CPT

## 2022-10-04 PROCEDURE — 6370000000 HC RX 637 (ALT 250 FOR IP): Performed by: INTERNAL MEDICINE

## 2022-10-04 PROCEDURE — 86376 MICROSOMAL ANTIBODY EACH: CPT

## 2022-10-04 PROCEDURE — 36415 COLL VENOUS BLD VENIPUNCTURE: CPT

## 2022-10-04 PROCEDURE — 6370000000 HC RX 637 (ALT 250 FOR IP): Performed by: STUDENT IN AN ORGANIZED HEALTH CARE EDUCATION/TRAINING PROGRAM

## 2022-10-04 PROCEDURE — 6360000002 HC RX W HCPCS: Performed by: INTERNAL MEDICINE

## 2022-10-04 PROCEDURE — 80069 RENAL FUNCTION PANEL: CPT

## 2022-10-04 PROCEDURE — 99232 SBSQ HOSP IP/OBS MODERATE 35: CPT | Performed by: INTERNAL MEDICINE

## 2022-10-04 PROCEDURE — 6360000002 HC RX W HCPCS: Performed by: SURGERY

## 2022-10-04 PROCEDURE — 2580000003 HC RX 258: Performed by: INTERNAL MEDICINE

## 2022-10-04 PROCEDURE — 1200000000 HC SEMI PRIVATE

## 2022-10-04 PROCEDURE — 6370000000 HC RX 637 (ALT 250 FOR IP): Performed by: SURGERY

## 2022-10-04 PROCEDURE — 80202 ASSAY OF VANCOMYCIN: CPT

## 2022-10-04 PROCEDURE — 2580000003 HC RX 258: Performed by: STUDENT IN AN ORGANIZED HEALTH CARE EDUCATION/TRAINING PROGRAM

## 2022-10-04 RX ORDER — OXYCODONE HYDROCHLORIDE 5 MG/1
5 TABLET ORAL EVERY 6 HOURS PRN
Status: DISCONTINUED | OUTPATIENT
Start: 2022-10-04 | End: 2022-10-05

## 2022-10-04 RX ORDER — METHYLPREDNISOLONE SODIUM SUCCINATE 125 MG/2ML
125 INJECTION, POWDER, LYOPHILIZED, FOR SOLUTION INTRAMUSCULAR; INTRAVENOUS ONCE
Status: COMPLETED | OUTPATIENT
Start: 2022-10-04 | End: 2022-10-04

## 2022-10-04 RX ORDER — METHYLPREDNISOLONE SODIUM SUCCINATE 40 MG/ML
40 INJECTION, POWDER, LYOPHILIZED, FOR SOLUTION INTRAMUSCULAR; INTRAVENOUS DAILY
Status: DISCONTINUED | OUTPATIENT
Start: 2022-10-05 | End: 2022-10-05 | Stop reason: HOSPADM

## 2022-10-04 RX ADMIN — GABAPENTIN 800 MG: 400 CAPSULE ORAL at 09:29

## 2022-10-04 RX ADMIN — MORPHINE SULFATE 15 MG: 15 TABLET, FILM COATED, EXTENDED RELEASE ORAL at 09:29

## 2022-10-04 RX ADMIN — GABAPENTIN 800 MG: 400 CAPSULE ORAL at 21:36

## 2022-10-04 RX ADMIN — PANTOPRAZOLE SODIUM 40 MG: 40 TABLET, DELAYED RELEASE ORAL at 05:53

## 2022-10-04 RX ADMIN — OXYCODONE HYDROCHLORIDE 5 MG: 5 TABLET ORAL at 15:58

## 2022-10-04 RX ADMIN — TIZANIDINE 4 MG: 4 TABLET ORAL at 12:04

## 2022-10-04 RX ADMIN — METHYLPREDNISOLONE SODIUM SUCCINATE 125 MG: 125 INJECTION, POWDER, FOR SOLUTION INTRAMUSCULAR; INTRAVENOUS at 14:35

## 2022-10-04 RX ADMIN — ONDANSETRON 4 MG: 2 INJECTION INTRAMUSCULAR; INTRAVENOUS at 16:05

## 2022-10-04 RX ADMIN — DIAZEPAM 5 MG: 5 TABLET ORAL at 12:04

## 2022-10-04 RX ADMIN — Medication 1000 UNITS: at 09:29

## 2022-10-04 RX ADMIN — PAROXETINE HYDROCHLORIDE 30 MG: 20 TABLET, FILM COATED ORAL at 09:29

## 2022-10-04 RX ADMIN — PROMETHAZINE HYDROCHLORIDE 25 MG: 25 INJECTION INTRAMUSCULAR; INTRAVENOUS at 17:26

## 2022-10-04 RX ADMIN — GABAPENTIN 800 MG: 400 CAPSULE ORAL at 14:37

## 2022-10-04 RX ADMIN — TIZANIDINE 4 MG: 4 TABLET ORAL at 03:48

## 2022-10-04 RX ADMIN — HYDROMORPHONE HYDROCHLORIDE 0.5 MG: 1 INJECTION, SOLUTION INTRAMUSCULAR; INTRAVENOUS; SUBCUTANEOUS at 12:04

## 2022-10-04 RX ADMIN — ENOXAPARIN SODIUM 30 MG: 100 INJECTION SUBCUTANEOUS at 21:37

## 2022-10-04 RX ADMIN — ENOXAPARIN SODIUM 30 MG: 100 INJECTION SUBCUTANEOUS at 09:30

## 2022-10-04 RX ADMIN — LEVETIRACETAM 1000 MG: 500 TABLET, FILM COATED ORAL at 21:36

## 2022-10-04 RX ADMIN — VANCOMYCIN HYDROCHLORIDE 1000 MG: 1 INJECTION, POWDER, LYOPHILIZED, FOR SOLUTION INTRAVENOUS at 04:34

## 2022-10-04 RX ADMIN — SODIUM CHLORIDE, PRESERVATIVE FREE 10 ML: 5 INJECTION INTRAVENOUS at 21:37

## 2022-10-04 RX ADMIN — CYANOCOBALAMIN TAB 1000 MCG 500 MCG: 1000 TAB at 09:30

## 2022-10-04 RX ADMIN — LEVETIRACETAM 1000 MG: 500 TABLET, FILM COATED ORAL at 09:30

## 2022-10-04 RX ADMIN — HYDROXYZINE HYDROCHLORIDE 50 MG: 50 TABLET, FILM COATED ORAL at 21:37

## 2022-10-04 RX ADMIN — PANTOPRAZOLE SODIUM 40 MG: 40 TABLET, DELAYED RELEASE ORAL at 15:58

## 2022-10-04 RX ADMIN — HYDROXYZINE HYDROCHLORIDE 50 MG: 50 TABLET, FILM COATED ORAL at 09:30

## 2022-10-04 RX ADMIN — MORPHINE SULFATE 15 MG: 15 TABLET, FILM COATED, EXTENDED RELEASE ORAL at 21:36

## 2022-10-04 RX ADMIN — VANCOMYCIN HYDROCHLORIDE 1000 MG: 1 INJECTION, POWDER, LYOPHILIZED, FOR SOLUTION INTRAVENOUS at 16:00

## 2022-10-04 RX ADMIN — LEVOTHYROXINE SODIUM 125 MCG: 25 TABLET ORAL at 05:53

## 2022-10-04 RX ADMIN — HYDROMORPHONE HYDROCHLORIDE 0.5 MG: 1 INJECTION, SOLUTION INTRAMUSCULAR; INTRAVENOUS; SUBCUTANEOUS at 05:54

## 2022-10-04 RX ADMIN — ONDANSETRON 4 MG: 2 INJECTION INTRAMUSCULAR; INTRAVENOUS at 05:52

## 2022-10-04 ASSESSMENT — PAIN DESCRIPTION - DESCRIPTORS: DESCRIPTORS: ACHING;BURNING

## 2022-10-04 ASSESSMENT — PAIN SCALES - GENERAL
PAINLEVEL_OUTOF10: 10
PAINLEVEL_OUTOF10: 7
PAINLEVEL_OUTOF10: 10
PAINLEVEL_OUTOF10: 7
PAINLEVEL_OUTOF10: 6
PAINLEVEL_OUTOF10: 5

## 2022-10-04 ASSESSMENT — PAIN DESCRIPTION - ORIENTATION: ORIENTATION: LEFT

## 2022-10-04 ASSESSMENT — PAIN DESCRIPTION - LOCATION
LOCATION: LEG
LOCATION: KNEE

## 2022-10-04 ASSESSMENT — PAIN DESCRIPTION - PAIN TYPE: TYPE: ACUTE PAIN

## 2022-10-04 ASSESSMENT — PAIN DESCRIPTION - ONSET: ONSET: ON-GOING

## 2022-10-04 ASSESSMENT — PAIN DESCRIPTION - FREQUENCY: FREQUENCY: CONTINUOUS

## 2022-10-04 ASSESSMENT — PAIN - FUNCTIONAL ASSESSMENT: PAIN_FUNCTIONAL_ASSESSMENT: ACTIVITIES ARE NOT PREVENTED

## 2022-10-04 NOTE — PROGRESS NOTES
V2.0  [facility] Hospitalist Progress Note      Name:  Rico Spears /Age/Sex: 1970  (46 y.o. female)   MRN & CSN:  [id number] & [id number] Encounter Date/Time: 10/4/2022 10:27 AM EDT    Location:  [address] [place] [address] PCP: No primary care provider on file. Hospital Day: 16    Assessment and Plan:   Rico Spears is a 46 y.o. female with left knee pain      Plan: Patient can be discharged morning of 10/5. Med rec and narcotic script provided by prior Hospitalist team. Started on steroid for rheumatoid arthritis flare. Should be okay to discharge tomorrow morning 10/05. Acute rheumatoid arthritis flare up: RF+ and anti-CCP+, history of oligoarticular; now with polyarticular symptom; did not find prior prescript of DMARDs on file.  -start steroid  -consult rheumatology  -discharge plan for tomorrow    L knee hemarthrosis - improving  - painful today, reportedly better than yesterday in regards to swelling and pain both  - no compression wrap and ice pack noted today morning  - ortho signed off, ID  onboard, on long term abx for septic arthritis  - Lovenox reduced to ppx dose; spoke to hematology regarding pause in [facility] up to two weeks to let knee heal, f/u their office in 1 week to monitor for DVT recurrence  - ortho recommending compression wrap compliance and no intervention at this time    Hypothyroidism  - home dose Synthroid 125  - TSH still elevated to 14  - endo consulted, f/u repeat TSH, T3, T4    Nausea/vomiting - resolved  - stable pneumobilia on CT  - low to no suspicion for cholangitis    DVT   - L distal femoral and popliteal  - had to reduce Lovenox to ppx dosing given hemarthrosis  - may require IVC filter, but will obtain repeat duplex prior to making this decision    Anemia  - blood loss  - s/p 1 unit pRBC    OA  Recent bariatric surgery and delayed wound healing    Ppx: Lovenox  Dispo: TBD    Subjective:     Chief Complaint: No chief complaint on file. Left knee pain.  Bilateral MCP and PIP redness and swelling, with some pain. Started on steroid. Consult placed for rheumatology. Compression wrap. Ice pack. Will require 4 weeks vancomycin via port on discharge. Hold AC for 2 weeks, f/u with heme and ID in one week. Review of Systems:    Review of Systems    10 point ROS negative except as stated above in \"subjective\" section    Objective:   No intake or output data in the 24 hours ending 10/04/22 1213       Vitals:   Vitals:    10/04/22 0624   BP:    Pulse:    Resp: 19   Temp:    SpO2:        Physical Exam:     General: NAD  Eyes: EOMI  ENT: neck supple  Cardiovascular: Regular rate. Respiratory: Clear to auscultation  Gastrointestinal: Soft, non tender  Genitourinary: no suprapubic tenderness  Musculoskeletal: left knee swelling and tenderness Sutures from recent arthroscopy noted. Bilateral MCP and PIP joints erythema and swelling  Skin: warm, dry  Neuro: Alert. Psych: Mood appropriate.      Medications:   Medications:    vancomycin (VANCOCIN) intermittent dosing (placeholder)   Other RX Placeholder    methylPREDNISolone  125 mg IntraVENous Once    Followed by   Radha Rivero ON 10/5/2022] methylPREDNISolone  40 mg IntraVENous Daily    morphine  15 mg Oral 2 times per day    enoxaparin  30 mg SubCUTAneous BID    lidocaine  1 patch TransDERmal Daily    scopolamine  1 patch TransDERmal Q72H    gabapentin  800 mg Oral TID    Vitamin D  1,000 Units Oral Daily    vitamin B-12  500 mcg Oral Daily    miconazole   Topical BID    sodium chloride flush  5-40 mL IntraVENous 2 times per day    ondansetron  4 mg IntraVENous 4 times per day    PARoxetine  30 mg Oral Daily    hydrOXYzine HCl  50 mg Oral BID    levothyroxine  125 mcg Oral Daily    levETIRAcetam  1,000 mg Oral BID    pantoprazole  40 mg Oral BID AC      Infusions:    sodium chloride      sodium chloride 100 mL/hr at 10/01/22 0308     PRN Meds: oxyCODONE, 5 mg, Q6H PRN  diazePAM, 5 mg, Q8H PRN  sodium chloride, , PRN  tiZANidine, 4 mg, Q8H PRN  sodium chloride flush, 5-40 mL, PRN  sodium chloride, , PRN  ondansetron, 4 mg, Q8H PRN   Or  ondansetron, 4 mg, Q6H PRN  polyethylene glycol, 17 g, Daily PRN  acetaminophen, 650 mg, Q6H PRN   Or  acetaminophen, 650 mg, Q6H PRN  promethazine, 25 mg, Q6H PRN      Labs      Recent Results (from the past 24 hour(s))   SPECIMEN REJECTION    Collection Time: 10/04/22  2:50 AM   Result Value Ref Range    Rejected Test BLOOD     Reason for Rejection UNABLE TO PERFORM TESTING:    Renal Function Panel    Collection Time: 10/04/22  5:50 AM   Result Value Ref Range    Sodium 136 135 - 145 MMOL/L    Potassium 3.6 3.5 - 5.1 MMOL/L    Chloride 103 99 - 110 mMol/L    CO2 27 21 - 32 MMOL/L    Anion Gap 6 4 - 16    BUN 16 6 - 23 MG/DL    Creatinine 0.7 0.6 - 1.1 MG/DL    Glucose 130 (H) 70 - 99 MG/DL    Calcium 9.2 8.3 - 10.6 MG/DL    GFR Non-African American >60 >60 mL/min/1.73m2    GFR African American >60 >60 mL/min/1.73m2    Albumin 3.3 (L) 3.4 - 5.0 GM/DL    Phosphorus 3.1 2.5 - 4.9 MG/DL   Vancomycin Level, Random    Collection Time: 10/04/22  5:50 AM   Result Value Ref Range    Vancomycin Rm 58.6 UG/ML    DOSE AMOUNT DOSE AMT. GIVEN - UNKNOWN     DOSE TIME DOSE TIME GIVEN - UNKNOWN         Imaging/Diagnostics Last 24 Hours   No results found.     Electronically signed by Von Lopez MD on 10/4/2022 at 12:13 PM

## 2022-10-04 NOTE — PROGRESS NOTES
Infectious Disease Progress Note  10/4/2022   Patient Name: Amaris Hogan : 1970     Assessment  Possible left knee septic arthritis: Afebrile, CRP is within normal limits. Status post left knee arthroscopic irrigation and debridement with drain placement on 2022. Cultures have remained negative. Initially not thought to be septic arthritis, however, patient continues pain in the knee which may be seen with hemarthrosis. Given her clinical picture, pain in the knee, obesity, operative procedure, and use of corticosteroids; antimicrobial therapy would be prudent  Therapeutic drug monitoring: on vancomycin but blood draws for trough are not being done at the appropriate time. 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 Had 3 loose BM on 2022. Unlikely to be colorectal.  Treated with single dose of doxycycline 30 mg on 2022. Acute DVT in left distal femoral and popliteal veins  Obesity BMI  40 .85 kg/m2      Plan  Therapeutic: IV vancomycin started on 22. (End date: 10/27/2022). Dose is being determined. After discharge the following should be done:  Weekly labs drawn on Monday during the course of treatment  CBC with differential, CMP, ESR, CRP,,Vancomycin Trough  IV vancomycin pharmacy to dose  Cathflo for blocked vascular access as needed  Fax results to Attn: [place] Staff  # [phone]  See in clinic within one week after discharge  Disposition: Home with antibiotic  Diagnostic:    F/u: Vancomycin trough  Other:        Reason for visit: F/u left knee swelling  History:? Interval history noted  Less pain in his left knee    Physical Exam:  Vital Signs: BP (!) 171/86   Pulse 80   Temp 97.5 °F (36.4 °C) (Oral)   Resp 19   Ht 5' 4.02\" (1.626 m)   Wt 238 lb 1.6 oz (108 kg)   SpO2 95%   BMI 40.85 kg/m²     Gen: alert and oriented X3, no distress  Skin: no stigmata of endocarditis  Wounds: Left knee dressing C/D/I  Chest: no distress and CTA.  Good air movement. Heart: RRR and no MRG. Abd: soft, non-distended, no tenderness, no hepatomegaly. Normoactive bowel sounds. Ext: left prepatellar effusion      Radiologic / Imaging / TESTING  No results found. Labs:    Recent Results (from the past 24 hour(s))   SPECIMEN REJECTION    Collection Time: 10/04/22  2:50 AM   Result Value Ref Range    Rejected Test BLOOD     Reason for Rejection UNABLE TO PERFORM TESTING:    Renal Function Panel    Collection Time: 10/04/22  5:50 AM   Result Value Ref Range    Sodium 136 135 - 145 MMOL/L    Potassium 3.6 3.5 - 5.1 MMOL/L    Chloride 103 99 - 110 mMol/L    CO2 27 21 - 32 MMOL/L    Anion Gap 6 4 - 16    BUN 16 6 - 23 MG/DL    Creatinine 0.7 0.6 - 1.1 MG/DL    Glucose 130 (H) 70 - 99 MG/DL    Calcium 9.2 8.3 - 10.6 MG/DL    GFR Non-African American >60 >60 mL/min/1.73m2    GFR African American >60 >60 mL/min/1.73m2    Albumin 3.3 (L) 3.4 - 5.0 GM/DL    Phosphorus 3.1 2.5 - 4.9 MG/DL   Vancomycin Level, Random    Collection Time: 10/04/22  5:50 AM   Result Value Ref Range    Vancomycin Rm 58.6 UG/ML    DOSE AMOUNT DOSE AMT. GIVEN - UNKNOWN     DOSE TIME DOSE TIME GIVEN - UNKNOWN        CULTURE results: Invalid input(s): BLOOD CULTURE,  URINE CULTURE, SURGICAL CULTURE    Diagnosis:  Patient Active Problem List   Diagnosis    Acute bilateral deep vein thrombosis (DVT) of femoral veins (HCC)    Acute deep vein thrombosis (DVT) of popliteal vein of left lower extremity (HCC)    Swelling of joint of left knee       Active Problems  Principal Problem:    Acute bilateral deep vein thrombosis (DVT) of femoral veins (HCC)  Active Problems:    Acute deep vein thrombosis (DVT) of popliteal vein of left lower extremity (HCC)    Swelling of joint of left knee  Resolved Problems:    * No resolved hospital problems.  *              Electronically signed by: Electronically signed by Janes Duran MD on 10/4/2022 at 1:40 PM

## 2022-10-04 NOTE — CONSULTS
Consult completed. Patient's left chest implanted port power flushed without difficulty. Now has brisk blood return and flushes with ease. Patient tolerated well. Please consult IV/PICC Team if patient's needs change.

## 2022-10-04 NOTE — CARE COORDINATION
CM reviewed notes. Pt is discharged. CM is following. Per notes pt is to be on  Vancomycin  IV at home. CM working with nursing regarding Vanco   Pt has DME ordered for walker  Case Franks was evaluated today and a DME order was entered for a wheeled walker with seat because she requires this to successfully complete daily living tasks of bathing, toileting, personal cares, and ambulating. A wheeled walker with seat is necessary due to the patient's unsteady gait, upper body weakness, inability to  and ambulation device, ambulating only short distances by pushing a walker, and the need to sit for a short time before resuming ambulation. These tasks cannot be completed with a lesser ambulation device such as a cane, crutch, or standard walker. The need for this equipment was discussed with the patient and she understands and is in agreement. 1230 CM received message from [place] that pt obtained a walker in 2019 and not eligible. Per notes pt is prob discharge tomorrow. CM updated her that unable to obtain walker through insurance. CM informed per pt that her mom might have it.  [address]

## 2022-10-04 NOTE — PROGRESS NOTES
[address] [facility]  consulted by Dr. Pascale Wooten for monitoring and adjustment. Indication for treatment: Bone and Joint Infection  Goal trough: [] 10-15 mcg/mL or [x] 15-20 mcg/ml  AUC/GLORIA: [] <500 or [x] 400-600    Pertinent Laboratory Values:   Temp Readings from Last 3 Encounters:   10/04/22 97.8 °F (36.6 °C) (Oral)     No results for input(s): WBC, LACTATE in the last 72 hours. Recent Labs     10/04/22  0550   BUN 16   CREATININE 0.7       Estimated Creatinine Clearance: 112 mL/min (based on SCr of 0.7 mg/dL). No intake or output data in the 24 hours ending 10/04/22 1526    Vancomycin level:   TROUGH:  No results for input(s): VANCOTROUGH in the last 72 hours. RANDOM:    Recent Labs     10/02/22  0501 10/04/22  0550 10/04/22  1435   VANCORANDOM 34.3 58.6 15.0       Assessment:  SCr, BUN, and urine output: SCr @ 0.7 (baseline 0.6)   Day(s) of therapy: 5 (duration of therapy to be determined)  Vancomycin concentration:   10/2 - 34.3, 4 hours post-dose, AUC > 600  10/4 - 58.6, PEAK level ~ 4 minutes post dose  10/4 - 15.0, 10 hours post-dose, therapeutic level    Plan:  Repeat of random level 10 hours post-dose is therapeutic,   Will continue on Vancomycin 1000mg IVPB every 12 hours  Patient discharge scheduled for tomorrow AM.  No further levels ordered. Pharmacy will continue to monitor patient and adjust therapy as indicated      Thank you for the consult.   Danielle Bansal [facility]  10/4/2022 3:26 PM

## 2022-10-04 NOTE — PROGRESS NOTES
Progress Note( Dr. Armando Hall)  10/4/2022  Subjective:   Admit Date: 9/19/2022  PCP: No primary care provider on file. Admitted For :  Acute bilateral DVT femoral vein    Consulted For: Better control of thyroid function    Interval History: Complains of pain in the left knee    Denies any chest pains,   Denies SOB . Denies nausea or vomiting. No new bowel or bladder symptoms. No intake or output data in the 24 hours ending 10/04/22 0908    DATA    CBC: No results for input(s): WBC, HGB, PLT in the last 72 hours. CMP:  Recent Labs     10/04/22  0550      K 3.6      CO2 27   BUN 16   CREATININE 0.7   CALCIUM 9.2   LABALBU 3.3*     Lipids: No results found for: CHOL, HDL, TRIG  Glucose:No results for input(s): POCGLU in the last 72 hours. [id number]:No results found for: LABA1C  High Sensitivity TSH:   Lab Results   Component Value Date/Time    TSHHS 3.430 10/02/2022 05:05 PM     Free T3:   Lab Results   Component Value Date/Time    FT3 2.0 10/02/2022 05:05 PM     Free T4:  Lab Results   Component Value Date/Time    T4FREE 1.31 10/02/2022 05:05 PM       US HEAD NECK SOFT TISSUE THYROID   Final Result   Heterogeneous, slightly hypervascular thyroid gland with a dominant 6 mm TR5   left thyroid nodule. Recommend follow-up in 1 year with ultrasound. RECOMMENDATIONS:   ACR TI-RADS recommendations:      TR5 (>= 7 points):  FNA if >= 1 cm; follow-up if 0.5-0.9 cm in 1, 2, 3, 4,   and 5 years      TR4 (4-6 points):  FNA if >= 1.5 cm; follow-up if 1.0-1.4 cm in 1, 2, 3, and   5 years      TR3 (3 points):  FNA if >= 2.5 cm; follow-up if 1.5-2.4 cm in 1, 3, and 5   years      TR2 (2 points):  No FNA or follow-up      TR1 (0 points):  No FNA or follow-up      ACR TI-RADS recommends that no more than two nodules with the highest ACR   TI-RADS point total should be biopsied and no more than four nodules should   be followed.          VL DUP LOWER EXTREMITY VENOUS LEFT   Final Result   No residual DVT is identified. Complex suprapatellar area similar to prior study measuring up to 8.2 cm may   represent complex effusion or prepatellar bursitis. MRI of the knee may be   helpful for further evaluation if clinically indicated. US EXTREMITY LEFT NON VASC LIMITED   Final Result   No residual DVT is identified. Complex suprapatellar area similar to prior study measuring up to 8.2 cm may   represent complex effusion or prepatellar bursitis. MRI of the knee may be   helpful for further evaluation if clinically indicated. XR KNEE LEFT (MIN 4 VIEWS)   Final Result   No acute abnormality. Mild osteoarthritis         XR CHEST PORTABLE   Final Result   No acute process. Cardiomegaly. XR KNEE LEFT (1-2 VIEWS)   Final Result   No acute osseous abnormality of the left knee. VL DUP LOWER EXTREMITY VENOUS LEFT   Final Result   1. Nonocclusive thrombus in the left distal femoral and popliteal veins. 2. Anechoic suprapatellar fluid collection may represent joint effusion or   fluid within the prepatellar bursa. Findings were discussed with CHARLIE Reyna at 4:34 pm on 9/19/2022. US EXTREMITY LEFT NON VASC LIMITED   Final Result   1. Nonocclusive thrombus in the left distal femoral and popliteal veins. 2. Anechoic suprapatellar fluid collection may represent joint effusion or   fluid within the prepatellar bursa. Findings were discussed with CHARLIE Reyna at 4:34 pm on 9/19/2022. CT ABDOMEN PELVIS W IV CONTRAST Additional Contrast? None   Final Result   1. Status post Jet-en-Y gastric bypass. Gastric pouch is mildly distended   with ingested contents but is otherwise nonspecific. This appears slightly   more distended when compared to recent CTs and could be related to recent   meal. The esophagus and Jet limb is normal in caliber. 2. Stable pneumobilia. 3. No other acute abdominal or pelvic abnormality.    4. Punctate nonobstructing left renal calculus. Scheduled Medicines   Medications:    vancomycin (VANCOCIN) intermittent dosing (placeholder)   Other RX Placeholder    morphine  15 mg Oral 2 times per day    enoxaparin  30 mg SubCUTAneous BID    lidocaine  1 patch TransDERmal Daily    scopolamine  1 patch TransDERmal Q72H    gabapentin  800 mg Oral TID    Vitamin D  1,000 Units Oral Daily    vitamin B-12  500 mcg Oral Daily    miconazole   Topical BID    sodium chloride flush  5-40 mL IntraVENous 2 times per day    ondansetron  4 mg IntraVENous 4 times per day    PARoxetine  30 mg Oral Daily    hydrOXYzine HCl  50 mg Oral BID    levothyroxine  125 mcg Oral Daily    levETIRAcetam  1,000 mg Oral BID    pantoprazole  40 mg Oral BID AC      Infusions:    sodium chloride      sodium chloride 100 mL/hr at 10/01/22 0308         Objective:   Vitals: BP (!) 171/86   Pulse 80   Temp 97.5 °F (36.4 °C) (Oral)   Resp 19   Ht 5' 4.02\" (1.626 m)   Wt 238 lb 1.6 oz (108 kg)   SpO2 95%   BMI 40.85 kg/m²   General appearance: alert and cooperative with exam  Neck: no JVD or bruit  Thyroid : Right lobe is somewhat bigger than the left lower base small nodule not able to feel but an ultrasound showed on the right side small nodule  Lungs: Has Vesicular Breath sounds   Heart:  regular rate and rhythm  Abdomen: soft, non-tender; bowel sounds normal; no masses,  no organomegaly  Musculoskeletal: Normal  Extremities: extremities normal, , no edema had left knee surgery  Both legs are tender  Neurologic:  Awake, alert, oriented to name, place and time. Cranial nerves II-XII are grossly intact. Motor is  intact. Sensory is intact. ,  and gait is abnormal.    Assessment:     Patient Active Problem List:     Acute bilateral deep vein thrombosis (DVT) of femoral veins (HCC)     Acute deep vein thrombosis (DVT) of popliteal vein of left lower extremity (HCC)     Swelling of joint of left knee      Plan:     Reviewed POC blood glucose .  Labs and X ray results   Reviewed Current Medicines   Will continue on current dose of Synthroid  Will follow in the office in 2 weeks  Will follow     .      Adolfo Way MD, MD

## 2022-10-04 NOTE — PROGRESS NOTES
[address] [facility]  consulted by Dr. Arturo Morrell for monitoring and adjustment. Indication for treatment: Bone and Joint Infection  Goal trough: [] 10-15 mcg/mL or [x] 15-20 mcg/ml  AUC/GLORIA: [] <500 or [x] 400-600    Pertinent Laboratory Values:   Temp Readings from Last 3 Encounters:   10/04/22 97.5 °F (36.4 °C) (Oral)     No results for input(s): WBC, LACTATE in the last 72 hours. Recent Labs     10/04/22  0550   BUN 16   CREATININE 0.7       Estimated Creatinine Clearance: 112 mL/min (based on SCr of 0.7 mg/dL). No intake or output data in the 24 hours ending 10/04/22 0952    Vancomycin level:   TROUGH:  No results for input(s): VANCOTROUGH in the last 72 hours. RANDOM:    Recent Labs     10/02/22  0501 10/04/22  0550   VANCORANDOM 34.3 58.6       Assessment:  SCr, BUN, and urine output: SCr @ 0.7 (baseline 0.6)   Day(s) of therapy: 5 (duration of therapy to be determined)  Vancomycin concentration:   10/2 - 34.3, 4 hours post-dose, AUC > 600  10/4 - 58.6, PEAK level ~ 4 minutes post dose    Plan:  Random level elevated; PEAK level drawn ~4 min post dose  Will hold further dosing for now. Repeat random level tomorrow AM  Pharmacy will continue to monitor patient and adjust therapy as indicated    Apoorva 3 10/5  @ 0600. Thank you for the consult.   Deborah Trejo, [facility]  10/4/2022 9:52 AM

## 2022-10-04 NOTE — PROGRESS NOTES
RN in to assess patient after PCA stated patient felt like she was going to have a seizure. Patient was shaking, drawing in L arm, and not responding to RN. RN placed smelling salt in front on patient and sternal rubbed her. Patient aroused to painful and noxious stimuli. RN educated her that if she was seizing, those two stimuli would not bring her out of it. Patient stated she needed something for pain, \"like dilaudid\", so that she would not be sent into another \"seizure-like state. \" Ice pack given for knee pain along with PRN zanaflex.

## 2022-10-05 VITALS
HEART RATE: 85 BPM | WEIGHT: 238.1 LBS | DIASTOLIC BLOOD PRESSURE: 85 MMHG | RESPIRATION RATE: 18 BRPM | HEIGHT: 64 IN | OXYGEN SATURATION: 100 % | BODY MASS INDEX: 40.65 KG/M2 | TEMPERATURE: 98.4 F | SYSTOLIC BLOOD PRESSURE: 138 MMHG

## 2022-10-05 LAB
ANTITHYROID MICORSOMAL: 31.3 IU/ML (ref 0–9)
COPPER: 181.6 UG/DL (ref 80–155)
CULTURE: ABNORMAL
CULTURE: ABNORMAL
Lab: ABNORMAL
SPECIMEN: ABNORMAL
ZINC: 64.2 UG/DL (ref 60–120)

## 2022-10-05 PROCEDURE — 6370000000 HC RX 637 (ALT 250 FOR IP): Performed by: STUDENT IN AN ORGANIZED HEALTH CARE EDUCATION/TRAINING PROGRAM

## 2022-10-05 PROCEDURE — 94761 N-INVAS EAR/PLS OXIMETRY MLT: CPT

## 2022-10-05 PROCEDURE — 2580000003 HC RX 258: Performed by: INTERNAL MEDICINE

## 2022-10-05 PROCEDURE — 6360000002 HC RX W HCPCS: Performed by: INTERNAL MEDICINE

## 2022-10-05 PROCEDURE — 6360000002 HC RX W HCPCS: Performed by: STUDENT IN AN ORGANIZED HEALTH CARE EDUCATION/TRAINING PROGRAM

## 2022-10-05 PROCEDURE — 2580000003 HC RX 258: Performed by: STUDENT IN AN ORGANIZED HEALTH CARE EDUCATION/TRAINING PROGRAM

## 2022-10-05 PROCEDURE — 6360000002 HC RX W HCPCS: Performed by: SURGERY

## 2022-10-05 PROCEDURE — 6370000000 HC RX 637 (ALT 250 FOR IP): Performed by: SURGERY

## 2022-10-05 PROCEDURE — 6370000000 HC RX 637 (ALT 250 FOR IP): Performed by: HOSPITALIST

## 2022-10-05 PROCEDURE — 99232 SBSQ HOSP IP/OBS MODERATE 35: CPT | Performed by: INTERNAL MEDICINE

## 2022-10-05 PROCEDURE — 6370000000 HC RX 637 (ALT 250 FOR IP): Performed by: INTERNAL MEDICINE

## 2022-10-05 RX ORDER — OXYCODONE HYDROCHLORIDE 5 MG/1
5 TABLET ORAL EVERY 6 HOURS PRN
Status: DISCONTINUED | OUTPATIENT
Start: 2022-10-05 | End: 2022-10-05

## 2022-10-05 RX ORDER — OXYCODONE HYDROCHLORIDE 5 MG/1
5 TABLET ORAL EVERY 6 HOURS PRN
Status: DISCONTINUED | OUTPATIENT
Start: 2022-10-05 | End: 2022-10-05 | Stop reason: HOSPADM

## 2022-10-05 RX ORDER — PREDNISONE 10 MG/1
TABLET ORAL
Qty: 20 TABLET | Refills: 0 | Status: SHIPPED | OUTPATIENT
Start: 2022-10-05

## 2022-10-05 RX ADMIN — ONDANSETRON 4 MG: 2 INJECTION INTRAMUSCULAR; INTRAVENOUS at 04:38

## 2022-10-05 RX ADMIN — ENOXAPARIN SODIUM 30 MG: 100 INJECTION SUBCUTANEOUS at 10:44

## 2022-10-05 RX ADMIN — CYANOCOBALAMIN TAB 1000 MCG 500 MCG: 1000 TAB at 10:36

## 2022-10-05 RX ADMIN — DIAZEPAM 5 MG: 5 TABLET ORAL at 00:06

## 2022-10-05 RX ADMIN — CEFTRIAXONE SODIUM 1000 MG: 1 INJECTION, POWDER, FOR SOLUTION INTRAMUSCULAR; INTRAVENOUS at 16:27

## 2022-10-05 RX ADMIN — ONDANSETRON 4 MG: 2 INJECTION INTRAMUSCULAR; INTRAVENOUS at 00:06

## 2022-10-05 RX ADMIN — LEVOTHYROXINE SODIUM 125 MCG: 25 TABLET ORAL at 06:08

## 2022-10-05 RX ADMIN — Medication 1000 UNITS: at 10:38

## 2022-10-05 RX ADMIN — ONDANSETRON 4 MG: 2 INJECTION INTRAMUSCULAR; INTRAVENOUS at 10:21

## 2022-10-05 RX ADMIN — HYDROMORPHONE HYDROCHLORIDE 0.5 MG: 1 INJECTION, SOLUTION INTRAMUSCULAR; INTRAVENOUS; SUBCUTANEOUS at 17:11

## 2022-10-05 RX ADMIN — PAROXETINE HYDROCHLORIDE 30 MG: 20 TABLET, FILM COATED ORAL at 10:36

## 2022-10-05 RX ADMIN — GABAPENTIN 800 MG: 400 CAPSULE ORAL at 14:24

## 2022-10-05 RX ADMIN — LEVETIRACETAM 1000 MG: 500 TABLET, FILM COATED ORAL at 10:36

## 2022-10-05 RX ADMIN — TIZANIDINE 4 MG: 4 TABLET ORAL at 04:40

## 2022-10-05 RX ADMIN — OXYCODONE HYDROCHLORIDE 5 MG: 5 TABLET ORAL at 06:07

## 2022-10-05 RX ADMIN — DIAZEPAM 5 MG: 5 TABLET ORAL at 10:21

## 2022-10-05 RX ADMIN — TIZANIDINE 4 MG: 4 TABLET ORAL at 14:31

## 2022-10-05 RX ADMIN — HYDROXYZINE HYDROCHLORIDE 50 MG: 50 TABLET, FILM COATED ORAL at 10:38

## 2022-10-05 RX ADMIN — SODIUM CHLORIDE, PRESERVATIVE FREE 10 ML: 5 INJECTION INTRAVENOUS at 10:39

## 2022-10-05 RX ADMIN — VANCOMYCIN HYDROCHLORIDE 1000 MG: 1 INJECTION, POWDER, LYOPHILIZED, FOR SOLUTION INTRAVENOUS at 04:42

## 2022-10-05 RX ADMIN — PANTOPRAZOLE SODIUM 40 MG: 40 TABLET, DELAYED RELEASE ORAL at 06:08

## 2022-10-05 RX ADMIN — METHYLPREDNISOLONE SODIUM SUCCINATE 40 MG: 40 INJECTION, POWDER, FOR SOLUTION INTRAMUSCULAR; INTRAVENOUS at 10:21

## 2022-10-05 RX ADMIN — MORPHINE SULFATE 15 MG: 15 TABLET, FILM COATED, EXTENDED RELEASE ORAL at 10:37

## 2022-10-05 RX ADMIN — OXYCODONE HYDROCHLORIDE 5 MG: 5 TABLET ORAL at 14:24

## 2022-10-05 RX ADMIN — GABAPENTIN 800 MG: 400 CAPSULE ORAL at 10:38

## 2022-10-05 RX ADMIN — PANTOPRAZOLE SODIUM 40 MG: 40 TABLET, DELAYED RELEASE ORAL at 16:27

## 2022-10-05 ASSESSMENT — PAIN DESCRIPTION - LOCATION
LOCATION: LEG
LOCATION: LEG

## 2022-10-05 ASSESSMENT — PAIN SCALES - GENERAL
PAINLEVEL_OUTOF10: 8
PAINLEVEL_OUTOF10: 7
PAINLEVEL_OUTOF10: 8
PAINLEVEL_OUTOF10: 8

## 2022-10-05 ASSESSMENT — PAIN DESCRIPTION - ORIENTATION
ORIENTATION: LEFT
ORIENTATION: LEFT

## 2022-10-05 ASSESSMENT — PAIN DESCRIPTION - DESCRIPTORS
DESCRIPTORS: SHARP;BURNING;THROBBING
DESCRIPTORS: SHARP

## 2022-10-05 NOTE — PROGRESS NOTES
Infectious Disease Progress Note  10/5/2022   Patient Name: Merissa Weems : 1970     Assessment  Possible left knee septic arthritis: Afebrile, CRP is within normal limits. Status post left knee arthroscopic irrigation and debridement with drain placement on 2022. Cultures have remained negative. Initially not thought to be septic arthritis, however, patient continues pain in the knee which may be seen with hemarthrosis. Given her clinical picture, pain in the knee, obesity, operative procedure, and use of corticosteroids; antimicrobial therapy would be prudent. Cutibacterium acnes isolated from broth. Therapeutic drug monitoring: on vancomycin but blood draws for trough are not being done at the appropriate time. 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 Had 3 loose BM on 2022. Unlikely to be colorectal.  Treated with single dose of doxycycline 30 mg on 2022. Acute DVT in left distal femoral and popliteal veins  Obesity BMI  40 .85 kg/m2      Plan  Therapeutic:  Start ceftriaxone on 10/5/2022 and treat for 6 weeks  (end-date: 11/15/2022)  After discharge the following should be done:  Weekly labs drawn on Monday during the course of treatment  CBC with differential, CMP, ESR, CRP,  IV vancomycin pharmacy to dose  Cathflo for blocked vascular access as needed  Fax results to Attn: [place] Staff  # [phone]  See in clinic within one week after discharge  Disposition: Home with antibiotic  Diagnostic:    F/u:   Other:        Reason for visit: F/u left knee swelling  History:? Interval history noted  Anterior left knee swelling. Physical Exam:  Vital Signs: /67   Pulse 69   Temp 98 °F (36.7 °C) (Oral)   Resp 20   Ht 5' 4.02\" (1.626 m)   Wt 238 lb 1.6 oz (108 kg)   SpO2 100%   BMI 40.85 kg/m²     Gen: alert and oriented X3, no distress  Skin: no stigmata of endocarditis  Wounds: Left knee dressing C/D/I  Chest: no distress and CTA.  Good air movement. Heart: RRR and no MRG. Abd: soft, non-distended, no tenderness, no hepatomegaly. Normoactive bowel sounds. Ext: left prepatellar effusion      Radiologic / Imaging / TESTING  No results found. Labs:    Recent Results (from the past 24 hour(s))   Vancomycin Level, Random    Collection Time: 10/04/22  2:35 PM   Result Value Ref Range    Vancomycin Rm 15.0 UG/ML    DOSE AMOUNT DOSE AMT. GIVEN - NONE     DOSE TIME DOSE TIME GIVEN - NONE        CULTURE results: Invalid input(s): BLOOD CULTURE,  URINE CULTURE, SURGICAL CULTURE    Diagnosis:  Patient Active Problem List   Diagnosis    Acute bilateral deep vein thrombosis (DVT) of femoral veins (HCC)    Acute deep vein thrombosis (DVT) of popliteal vein of left lower extremity (HCC)    Swelling of joint of left knee       Active Problems  Principal Problem:    Acute bilateral deep vein thrombosis (DVT) of femoral veins (HCC)  Active Problems:    Acute deep vein thrombosis (DVT) of popliteal vein of left lower extremity (HCC)    Swelling of joint of left knee  Resolved Problems:    * No resolved hospital problems.  *              Electronically signed by: Electronically signed by Gabriela Thomas MD on 10/5/2022 at 2:11 PM

## 2022-10-05 NOTE — CARE COORDINATION
Spoke with Roberta Cooney CM who states patient is likely ready for discharge today. Updated Steffany with [address] who states she will look into case and update this CM. 11:25 AM  Spoke with Denae Kapadia with [address] who [facility] due [facility] and [facility]. Updated Shannon Schultz RN CM via phone call and PS message to Dr. Emigdio Park. Awaiting further update from Denae Kapadia.      11:33 AM  Spoke with Denae Kapadia with [address] who states referral being sent to [facility] for home pharmacy needs/IV Vanco. IV atbx must be set up completely prior to patient discharges for IV atbx administration. 2:06 PM  Updated by Denae Kapadia with [address] [facility] [address] has accepted pt for home infusions though IV Vanco RX is needed ASAP. PS to Dr. Emigdio Park to update. 3:09 PM  Updated by Denae Kapadia with [address] she has everything needed for patient to be serviced for home IV atbx per them with medication delivered by [facility]. Updated Michelle Phipps pt is able to discharge following her 5 pm dose of IV Vanco and he verbalized understanding. 3:50 PM  Pt IV atbx changed to rocephin daily. Signed RX received per Dr. Emigdio Park and Denae Kapadia with [address] faxed information to [facility] with plan of delivery by tomorrow morning and start of care per [address] tomorrow. Pt is aware as Steffany with [facility] updated her already. Updated Juwan Mireles RN of need to administer dose of IV Rocephin prior to discharging pt this evening and he verbalized understanding.

## 2022-10-05 NOTE — DISCHARGE INSTRUCTIONS
- Please schedule an appointment to see rheumatology clinic  -Please schedule appointment to see infectious disease doctor, PCP  -Please take medications as prescribed

## 2022-10-05 NOTE — DISCHARGE INSTR - COC
Continuity of Care Form    Patient Name: Lico Desai   :  1970  MRN:  [id number]    Admit date:  2022  Discharge date:  ***    Code Status Order: Full Code   Advance Directives:   Advance Care Flowsheet Documentation       Date/Time Healthcare Directive Type of Healthcare Directive Copy in [address] Agent's Name Healthcare Agent's Phone Number    22 1692 No, patient does not have an advance directive for healthcare treatment -- -- -- -- --    22 0709 No, patient does not have an advance directive for healthcare treatment -- -- -- -- --            Admitting Physician:  No admitting provider for patient encounter. PCP: No primary care provider on file. Discharging Nurse: [facility] Unit/Room#: 7596/0062-J  Discharging Unit Phone Number: ***    Emergency Contact:   Extended Emergency Contact Information  Primary Emergency Contact: [address] [place] [address] [facility]  Mobile Phone: [phone]  Relation: Child  Preferred language: English   needed?  No  Secondary Emergency Contact: [address] [place] [address] Drive  Mobile Phone: [phone]  Relation: Child    Past Surgical History:  Past Surgical History:   Procedure Laterality Date    ABDOMEN SURGERY      APPENDECTOMY      CHOLECYSTECTOMY      HERNIA REPAIR      HYSTERECTOMY (CERVIX STATUS UNKNOWN)      KNEE ARTHROSCOPY Left 2022    LEFT KNEE ARTHROSCOPIC IRRIGATION AND DEBRIDEMENT WITH DRAIN PLACEMENT performed by Tameka Sales DO at [address]         Immunization History:   Immunization History   Administered Date(s) Administered    COVID-19, PFIZER PURPLE top, DILUTE for use, (age 15 y+), 30mcg/0.3mL 2021, 2021, 2022       Active Problems:  Patient Active Problem List   Diagnosis Code    Acute bilateral deep vein thrombosis (DVT) of femoral veins ([facility]) I82.413    Acute deep vein thrombosis (DVT) of popliteal vein of left lower extremity ([address] I82.432    Swelling of joint of left knee M25.462       Isolation/Infection:   Isolation            C Diff Contact          Patient Infection Status       Infection Onset Added Last Indicated Last Indicated By Review Planned Expiration Resolved Resolved By    C-diff Rule Out 09/19/22 09/19/22 09/19/22 Clostridium Difficile Toxin/Antigen (Ordered) 09/26/22 10/13/22              Nurse Assessment:  Last Vital Signs: /67   Pulse 69   Temp 98 °F (36.7 °C) (Oral)   Resp 16   Ht 5' 4.02\" (1.626 m)   Wt 238 lb 1.6 oz (108 kg)   SpO2 100%   BMI 40.85 kg/m²     Last documented pain score (0-10 scale): Pain Level: 8  Last Weight:   Wt Readings from Last 1 Encounters:   09/28/22 238 lb 1.6 oz (108 kg)     Mental Status:  {IP PT MENTAL STATUS:20030}    IV Access:  { MALCOLM IV ACCESS:147860572}    Nursing Mobility/ADLs:  Walking   {CHP DME HKGP:418409335}  Transfer  {CHP DME MHGT:371964988}  Bathing  {CHP DME SXPP:331199390}  Dressing  {CHP DME EOYZ:821933014}  Toileting  {CHP DME QDWK:640997875}  Feeding  {CHP DME DOKR:234256952}  Med Admin  {CHP DME IQMW:881017062}  Med Delivery   { MALCOLM MED Delivery:238566541}    Wound Care Documentation and Therapy:  Incision 09/20/22 Knee Anterior; Left (Active)   Dressing Status Other (Comment) 10/04/22 2003   Dressing Change Due 10/04/22 10/04/22 0001   Incision Cleansed Soap and water 10/04/22 0001   Dressing/Treatment Open to air 10/04/22 2003   Closure Sutures 09/29/22 1930   Margins Approximated 10/04/22 2003   Incision Assessment Dry 10/04/22 2003   Drainage Amount None 10/04/22 2003   Drainage Description Serosanguinous 10/03/22 2041   Odor None 10/04/22 2003   Marion-incision Assessment Intact 10/04/22 2003   Number of days: 15        Elimination:  Continence:    Bowel: {YES / JJ:99764}  Bladder: {YES / IS:29130}  Urinary Catheter: {Urinary Catheter:710535212}   Colostomy/Ileostomy/Ileal Conduit: {YES / OF:64608}       Date of Last BM: ***  No intake or output data in the 24 hours ending 10/05/22 0825  No intake/output data recorded.     Safety Concerns:     [address] Safety Concerns:737076125}    Impairments/Disabilities:      [address] MALCOLM Impairments/Disabilities:365427984}    Nutrition Therapy:  Current Nutrition Therapy:   [address] MALCOLM Diet List:799119663}    Routes of Feeding: {CHP DME Other Feedings:550225294}  Liquids: {Slp liquid thickness:04953}  Daily Fluid Restriction: {CHP DME Yes amt example:143449750}  Last Modified Barium Swallow with Video (Video Swallowing Test): {Done Not Done MZJO:405610082}    Treatments at the Time of Hospital Discharge:   Respiratory Treatments: ***  Oxygen Therapy:  {Therapy; copd oxygen:95364}  Ventilator:    {MH CC Vent VAJN:290289777}    Rehab Therapies: {THERAPEUTIC INTERVENTION:9644543410}  Weight Bearing Status/Restrictions: Martin Hernandez  Weight Bearin}  Other Medical Equipment (for information only, NOT a DME order):  {EQUIPMENT:085202950}  Other Treatments: ***    Patient's personal belongings (please select all that are sent with patient):  {CHP DME Belongings:749583939}    RN SIGNATURE:  {Esignature:971907153}    CASE MANAGEMENT/SOCIAL WORK SECTION    Inpatient Status Date: ***    Readmission Risk Assessment Score:  Readmission Risk              Risk of Unplanned Readmission:  16           Discharging to Facility/ Agency   Name:   Address:  Phone:  Fax:    Dialysis Facility (if applicable)   Name:  Address:  Dialysis Schedule:  Phone:  Fax:    / signature: {Esignature:815211336}    PHYSICIAN SECTION    Prognosis: {Prognosis:6098990025}    Condition at Discharge: [address] Hernandez Patient Condition:663350744}    Rehab Potential (if transferring to Rehab): {Prognosis:6097554136}    Recommended Labs or Other Treatments After Discharge: ***    Physician Certification: I certify the above information and transfer of Mikhail Rai  is necessary for the continuing treatment of the diagnosis listed and that she requires {Admit to Appropriate Level of Care:56195} for {GREATER/LESS:478838270} 30 days.      Update Admission H&P: {CHP DME Changes in MBLQ}    PHYSICIAN SIGNATURE:  {Esignature:681376653}

## 2022-10-06 LAB
CULTURE: NORMAL
DILUTE RUSSELL VIPER VENOM TIME: 30 SEC (ref 33–44)
DRVVT 1 TO 1 MIX REFLEX ONLY: ABNORMAL SEC (ref 33–44)
DRVVT CONFIRMATION TEST: ABNORMAL RATIO
Lab: NORMAL
SPECIMEN: NORMAL

## 2022-10-06 NOTE — PROGRESS NOTES
Progress Note( Dr. Mikaela Connolly)  10/5/2022  Subjective:   Admit Date: 9/19/2022  PCP: No primary care provider on file. Admitted For :  Acute bilateral DVT femoral vein    Consulted For: Better control of thyroid function    Interval History: Complains of pain in the left knee    Denies any chest pains,   Denies SOB . Denies nausea or vomiting. No new bowel or bladder symptoms. Intake/Output Summary (Last 24 hours) at 10/5/2022 2219  Last data filed at 10/5/2022 1037  Gross per 24 hour   Intake 10 ml   Output --   Net 10 ml       DATA    CBC: No results for input(s): WBC, HGB, PLT in the last 72 hours. CMP:  Recent Labs     10/04/22  0550      K 3.6      CO2 27   BUN 16   CREATININE 0.7   CALCIUM 9.2   LABALBU 3.3*       Lipids: No results found for: CHOL, HDL, TRIG  Glucose:No results for input(s): POCGLU in the last 72 hours. [id number]:No results found for: LABA1C  High Sensitivity TSH:   Lab Results   Component Value Date/Time    TSHHS 3.430 10/02/2022 05:05 PM     Free T3:   Lab Results   Component Value Date/Time    FT3 2.0 10/02/2022 05:05 PM     Free T4:  Lab Results   Component Value Date/Time    T4FREE 1.31 10/02/2022 05:05 PM       US HEAD NECK SOFT TISSUE THYROID   Final Result   Heterogeneous, slightly hypervascular thyroid gland with a dominant 6 mm TR5   left thyroid nodule. Recommend follow-up in 1 year with ultrasound.       RECOMMENDATIONS:   ACR TI-RADS recommendations:      TR5 (>= 7 points):  FNA if >= 1 cm; follow-up if 0.5-0.9 cm in 1, 2, 3, 4,   and 5 years      TR4 (4-6 points):  FNA if >= 1.5 cm; follow-up if 1.0-1.4 cm in 1, 2, 3, and   5 years      TR3 (3 points):  FNA if >= 2.5 cm; follow-up if 1.5-2.4 cm in 1, 3, and 5   years      TR2 (2 points):  No FNA or follow-up      TR1 (0 points):  No FNA or follow-up      ACR TI-RADS recommends that no more than two nodules with the highest ACR   TI-RADS point total should be biopsied and no more than four nodules should   be followed. VL DUP LOWER EXTREMITY VENOUS LEFT   Final Result   No residual DVT is identified. Complex suprapatellar area similar to prior study measuring up to 8.2 cm may   represent complex effusion or prepatellar bursitis. MRI of the knee may be   helpful for further evaluation if clinically indicated. US EXTREMITY LEFT NON VASC LIMITED   Final Result   No residual DVT is identified. Complex suprapatellar area similar to prior study measuring up to 8.2 cm may   represent complex effusion or prepatellar bursitis. MRI of the knee may be   helpful for further evaluation if clinically indicated. XR KNEE LEFT (MIN 4 VIEWS)   Final Result   No acute abnormality. Mild osteoarthritis         XR CHEST PORTABLE   Final Result   No acute process. Cardiomegaly. XR KNEE LEFT (1-2 VIEWS)   Final Result   No acute osseous abnormality of the left knee. VL DUP LOWER EXTREMITY VENOUS LEFT   Final Result   1. Nonocclusive thrombus in the left distal femoral and popliteal veins. 2. Anechoic suprapatellar fluid collection may represent joint effusion or   fluid within the prepatellar bursa. Findings were discussed with CHARLIE Rosenthal at 4:34 pm on 9/19/2022. US EXTREMITY LEFT NON VASC LIMITED   Final Result   1. Nonocclusive thrombus in the left distal femoral and popliteal veins. 2. Anechoic suprapatellar fluid collection may represent joint effusion or   fluid within the prepatellar bursa. Findings were discussed with CHARLIE Rosenthal at 4:34 pm on 9/19/2022. CT ABDOMEN PELVIS W IV CONTRAST Additional Contrast? None   Final Result   1. Status post Jet-en-Y gastric bypass. Gastric pouch is mildly distended   with ingested contents but is otherwise nonspecific. This appears slightly   more distended when compared to recent CTs and could be related to recent   meal. The esophagus and Jet limb is normal in caliber.    2. Stable pneumobilia. 3. No other acute abdominal or pelvic abnormality. 4. Punctate nonobstructing left renal calculus. US VENOUS DOPPLER LOWER EXTREMITIES BILATERAL    (Results Pending)        Scheduled Medicines   Medications:   REM     Infusions:   REM        Objective:   Vitals: /85   Pulse 85   Temp 98.4 °F (36.9 °C) (Oral)   Resp 18   Ht 5' 4.02\" (1.626 m)   Wt 238 lb 1.6 oz (108 kg)   SpO2 100%   BMI 40.85 kg/m²   General appearance: alert and cooperative with exam  Neck: no JVD or bruit  Thyroid : Right lobe is somewhat bigger than the left lower base small nodule not able to feel but an ultrasound showed on the right side small nodule  Lungs: Has Vesicular Breath sounds   Heart:  regular rate and rhythm  Abdomen: soft, non-tender; bowel sounds normal; no masses,  no organomegaly  Musculoskeletal: Normal  Extremities: extremities normal, , no edema had left knee surgery  Both legs are tender  Neurologic:  Awake, alert, oriented to name, place and time. Cranial nerves II-XII are grossly intact. Motor is  intact. Sensory is intact. ,  and gait is abnormal.    Assessment:     Patient Active Problem List:     Acute bilateral deep vein thrombosis (DVT) of femoral veins (HCC)     Acute deep vein thrombosis (DVT) of popliteal vein of left lower extremity (HCC)     Swelling of joint of left knee      Plan:     Reviewed POC blood glucose . Labs and X ray results   Reviewed Current Medicines   Will continue on current dose of Synthroid  Will follow in the office in 2 weeks  Will follow     .      Bibi Rogers MD, MD

## 2022-10-07 LAB — ANTITHYROGLOBULIN AB: 0.9 IU/ML (ref 0–4)

## 2022-10-09 NOTE — DISCHARGE SUMMARY
Discharge Summary    Name:  Sam Perales /Age/Sex: 1970  (46 y.o. female)   MRN & CSN:  [id number] & [id number] Admission Date/Time: 2022 12:26 PM   Attending:  No att. providers found Discharging Physician: Tana Jay MD     Hospital Course:   Sam Perales is a 46 y.o.  female  who presents with Acute bilateral deep vein thrombosis (DVT) of femoral veins ([address]      The following problems have been addressed during this hospitalization. Possible left knee septic arthritis. S/p arthroscopic irrigation and debridement with drain placement on 2022. ID consulted who recommended IV abx. Pt was initially on vancomycin until day of discharge. Broth culture grew CUTIBACTERIUM ACNES resulted updated day of discharge; pt abx was changed to ceftriaxone to continue until 11/15/2022    L knee hemarthrosis - improving  - ortho signed off, ID  onboard, on long term abx for septic arthritis  - Pt was initially on Lovenox full dose but reduced to prophylactic dose due to hemorrhage.   - ortho recommending compression wrap compliance and no intervention at this time        Nausea/vomiting - resolved   GI PCR showed V Cholerae but pt had diarrhea for one day only. Stable pneumobilia on CT. Received single dose doxycyline 300 mg 2022. Suspected Non-occlusive DVT  - L distal femoral and popliteal doppler  doppler   - had to reduce Lovenox to ppx dosing given hemarthrosis  - Repeat doppler 10/3 with no evidence of DVT. - Unsure if pt had real DVT  - advised pt to repeat doppler in 1-2 weeks and to contact her PCP        Suspected acute rheumatoid arthritis flare up: RF+ and anti-CCP+, history of oligoarticular; now with polyarticular symptom; no history of DMARDs on file.   -pt was started on steroid inpatient; discharged on taper  -pt advised to follow up with rheumatology    Acute Anemia due to hemarthrosis  Hgb as low as 6.9;  - s/p 1 unit pRBC  - Improved    Patient was seen and examined at bedside on day of discharge. This note can be used as the progress note for today's visit. She was still complaining of severe pain in her left knee pain. Pt has an appointment with pain clinic day after discharge. Pt was cleared for discharge by ID. No sign of worsening infection or hematoma. Endorses nausea but no vomiting. Denies lightheadedness, dizziness, fever, night sweats, chills, chest pain, cough, dyspnea, palpitations, abd pain, diarrhea, dysuria. Physical Exam  Vitals:   Vitals:    10/05/22 1741   BP:    Pulse:    Resp: 18   Temp:    SpO2:      General: NAD  Eyes: EOMI  ENT: neck supple  Cardiovascular: Regular rate. Respiratory: Clear to auscultation  Gastrointestinal: Soft, non tender  Genitourinary: no suprapubic tenderness  Musculoskeletal: left knee swelling and tenderness Sutures from recent arthroscopy noted. Bilateral MCP and PIP joints erythema and swelling  Skin: warm, dry  Neuro: Alert. Psych: Mood appropriate. The patient expressed appropriate understanding of and agreement with the discharge recommendations, medications, and plan.      Consults this admission:  IP CONSULT TO HOSPITALIST  IP CONSULT TO ORTHOPEDIC SURGERY  IP CONSULT TO PHARMACY  IP CONSULT TO GI  IP CONSULT TO IV TEAM  IP CONSULT TO PODIATRY  IP CONSULT TO INFECTIOUS DISEASES  IP CONSULT TO HEM/ONC  PHARMACY TO DOSE VANCOMYCIN  IP CONSULT TO ENDOCRINOLOGY  IP CONSULT TO HOME CARE NEEDS  IP CONSULT TO IV TEAM  IP CONSULT TO RHEUMATOLOGY  IP CONSULT TO HOME CARE NEEDS      Discharge Instruction:   Handoff to PCP:   - Follow up on repeat doppler in 2 weeks      Follow up appointments: rheumatology, ID, Orthopedic surgery, endocrinology    Primary care physician:     Diet:  regular diet   Activity: activity as tolerated  Disposition: Discharged to:   []Home, [x]HHC, []SNF, []Acute Rehab, []Hospice   Condition on discharge: Stable    Discharge Medications:        Medication List        START taking these medications      cefTRIAXone  infusion  Commonly known as: ROCEPHIN  Infuse 1,000 mg intravenously every 24 hours Compound per protocol     predniSONE 10 MG tablet  Commonly known as: DELTASONE  4 tablets once daily for 2 days then 3 tablets once daily for 2 days then 2 tablets once daily for 2 days then 1 tablet once daily for 2 days. CHANGE how you take these medications      promethazine 25 MG tablet  Commonly known as: PHENERGAN  Take 1 tablet by mouth every 6 hours as needed for Nausea  What changed: when to take this            CONTINUE taking these medications      estradiol 0.5 MG tablet  Commonly known as: ESTRACE     gabapentin 800 MG tablet  Commonly known as: NEURONTIN     hydrOXYzine HCl 50 MG tablet  Commonly known as: ATARAX     levETIRAcetam 1000 MG tablet  Commonly known as: KEPPRA     levothyroxine 125 MCG tablet  Commonly known as: SYNTHROID     melatonin 10 MG Caps capsule     oxyCODONE-acetaminophen 5-325 MG per tablet  Commonly known as: PERCOCET  Take 1 tablet by mouth every 4 hours as needed for Pain for up to 7 days.      PAROXETINE HCL PO     PROTONIX PO     scopolamine transdermal patch  Commonly known as: TRANSDERM-SCOP     vitamin B-12 500 MCG tablet  Commonly known as: CYANOCOBALAMIN     vitamin D 25 MCG (1000 UT) Tabs tablet  Commonly known as: CHOLECALCIFEROL     Zanaflex 4 MG tablet  Generic drug: tiZANidine            STOP taking these medications      ondansetron 4 MG tablet  Commonly known as: ZOFRAN               Where to Get Your Medications        These medications were sent to [address] [place] [address], [address] [facility]       Phone: [phone]   promethazine 25 MG tablet       You can get these medications from any pharmacy    Bring a paper prescription for each of these medications  cefTRIAXone  infusion  oxyCODONE-acetaminophen 5-325 MG per tablet  predniSONE 10 MG tablet         Objective Findings at Discharge:       BMP/CBC  No results for input(s): NA, K, CL, CO2, BUN, CREATININE, GLU, WBC, HEMOGLOBIN, HCT, PLT in the last 72 hours. IMAGING:      Additional Information: Patient seen and examined day of discharge.  For more information regarding patient's care please contact [facility] records [phone]    Discharge Time of 35 minutes    Electronically signed by Jalen Tee MD on 10/9/2022 at 12:10 AM

## 2022-10-10 ENCOUNTER — HOSPITAL ENCOUNTER (OUTPATIENT)
Age: 52
Setting detail: SPECIMEN
Discharge: HOME | End: 2022-10-10
Payer: COMMERCIAL

## 2022-10-10 ENCOUNTER — TELEPHONE (OUTPATIENT)
Dept: ORTHOPEDIC SURGERY | Age: 52
End: 2022-10-10

## 2022-10-10 ENCOUNTER — TELEPHONE (OUTPATIENT)
Dept: INTERNAL MEDICINE CLINIC | Age: 52
End: 2022-10-10

## 2022-10-10 LAB
ALBUMIN SERPL-MCNC: 3.9 GM/DL (ref 3.4–5)
ALP BLD-CCNC: 115 IU/L (ref 40–128)
ALT SERPL-CCNC: 17 U/L (ref 10–40)
ANION GAP SERPL CALCULATED.3IONS-SCNC: 13 MMOL/L (ref 4–16)
AST SERPL-CCNC: 18 IU/L (ref 15–37)
BASOPHILS ABSOLUTE: 0.1 K/CU MM
BASOPHILS RELATIVE PERCENT: 0.8 % (ref 0–1)
BILIRUB SERPL-MCNC: 0.2 MG/DL (ref 0–1)
BUN BLDV-MCNC: 18 MG/DL (ref 6–23)
C-REACTIVE PROTEIN, HIGH SENSITIVITY: 18.8 MG/L
CALCIUM SERPL-MCNC: 9.7 MG/DL (ref 8.3–10.6)
CHLORIDE BLD-SCNC: 105 MMOL/L (ref 99–110)
CO2: 22 MMOL/L (ref 21–32)
CREAT SERPL-MCNC: 0.7 MG/DL (ref 0.6–1.1)
CULTURE: NORMAL
CULTURE: NORMAL
DIFFERENTIAL TYPE: ABNORMAL
EOSINOPHILS ABSOLUTE: 0.4 K/CU MM
EOSINOPHILS RELATIVE PERCENT: 6.5 % (ref 0–3)
ERYTHROCYTE SEDIMENTATION RATE: 28 MM/HR (ref 0–30)
GFR AFRICAN AMERICAN: >60 ML/MIN/1.73M2
GFR NON-AFRICAN AMERICAN: >60 ML/MIN/1.73M2
GLUCOSE BLD-MCNC: 89 MG/DL (ref 70–99)
GRAM SMEAR: NORMAL
GRAM SMEAR: NORMAL
HCT VFR BLD CALC: 31.8 % (ref 37–47)
HEMOGLOBIN: 9.6 GM/DL (ref 12.5–16)
IMMATURE NEUTROPHIL %: 0.3 % (ref 0–0.43)
LYMPHOCYTES ABSOLUTE: 1.8 K/CU MM
LYMPHOCYTES RELATIVE PERCENT: 29.5 % (ref 24–44)
Lab: NORMAL
Lab: NORMAL
MCH RBC QN AUTO: 27 PG (ref 27–31)
MCHC RBC AUTO-ENTMCNC: 30.2 % (ref 32–36)
MCV RBC AUTO: 89.3 FL (ref 78–100)
MONOCYTES ABSOLUTE: 0.5 K/CU MM
MONOCYTES RELATIVE PERCENT: 7.9 % (ref 0–4)
NUCLEATED RBC %: 0 %
PDW BLD-RTO: 14.1 % (ref 11.7–14.9)
PLATELET # BLD: 305 K/CU MM (ref 140–440)
PMV BLD AUTO: 10.6 FL (ref 7.5–11.1)
POTASSIUM SERPL-SCNC: 4.4 MMOL/L (ref 3.5–5.1)
RBC # BLD: 3.56 M/CU MM (ref 4.2–5.4)
SEGMENTED NEUTROPHILS ABSOLUTE COUNT: 3.4 K/CU MM
SEGMENTED NEUTROPHILS RELATIVE PERCENT: 55 % (ref 36–66)
SODIUM BLD-SCNC: 140 MMOL/L (ref 135–145)
SPECIMEN: NORMAL
SPECIMEN: NORMAL
TOTAL IMMATURE NEUTOROPHIL: 0.02 K/CU MM
TOTAL NUCLEATED RBC: 0 K/CU MM
TOTAL PROTEIN: 6.2 GM/DL (ref 6.4–8.2)
WBC # BLD: 6.1 K/CU MM (ref 4–10.5)

## 2022-10-10 PROCEDURE — 86140 C-REACTIVE PROTEIN: CPT

## 2022-10-10 PROCEDURE — 80053 COMPREHEN METABOLIC PANEL: CPT

## 2022-10-10 PROCEDURE — 85025 COMPLETE CBC W/AUTO DIFF WBC: CPT

## 2022-10-10 PROCEDURE — 85652 RBC SED RATE AUTOMATED: CPT

## 2022-10-10 NOTE — TELEPHONE ENCOUNTER
Patient called stating that she is unable to bend her left knee/leg still and having excruciating pain. She was prescribed pain medications and they are no helping. She was recently hospitalized for DVT. Patient states that her left leg is also warm to the touch. She called Dr. Mariano Villatoro office prior to ours and they advised her to go the the ER. I advised patient to go to ER due to other office advising her to do so. She states she will be going to the ER.

## 2022-10-10 NOTE — TELEPHONE ENCOUNTER
Pt called stating that her pain is not getting any better and the edema is still persistent. Pain level 7/10 and pain has no subsided since surgery and she is still not able the knee. She would like to be advise if she should make a sooner appt or go to the ER. I discussed pt care with Dr Elicia Eid and he advised that she follow up at the ER for further testing. Pt advised and voiced understanding.

## 2022-10-10 NOTE — TELEPHONE ENCOUNTER
Fatoumata Bennett , called to notify patient was hospitalized at [facility] from 09/19-10/05 for knee infection and DVT.

## 2022-10-11 ENCOUNTER — HOSPITAL ENCOUNTER (EMERGENCY)
Age: 52
Discharge: HOME | End: 2022-10-11
Attending: EMERGENCY MEDICINE
Payer: COMMERCIAL

## 2022-10-11 ENCOUNTER — APPOINTMENT (OUTPATIENT)
Dept: GENERAL RADIOLOGY | Age: 52
End: 2022-10-11
Payer: COMMERCIAL

## 2022-10-11 ENCOUNTER — APPOINTMENT (OUTPATIENT)
Dept: ULTRASOUND IMAGING | Age: 52
End: 2022-10-11
Payer: COMMERCIAL

## 2022-10-11 VITALS
HEIGHT: 64 IN | RESPIRATION RATE: 20 BRPM | TEMPERATURE: 98.4 F | DIASTOLIC BLOOD PRESSURE: 97 MMHG | OXYGEN SATURATION: 100 % | WEIGHT: 238 LBS | HEART RATE: 74 BPM | BODY MASS INDEX: 40.63 KG/M2 | SYSTOLIC BLOOD PRESSURE: 138 MMHG

## 2022-10-11 DIAGNOSIS — M17.12 TRICOMPARTMENT OSTEOARTHRITIS OF LEFT KNEE: ICD-10-CM

## 2022-10-11 DIAGNOSIS — M25.462 PAIN AND SWELLING OF LEFT KNEE: Primary | ICD-10-CM

## 2022-10-11 DIAGNOSIS — M25.562 PAIN AND SWELLING OF LEFT KNEE: Primary | ICD-10-CM

## 2022-10-11 LAB
ALBUMIN SERPL-MCNC: 3.7 GM/DL (ref 3.4–5)
ALP BLD-CCNC: 104 IU/L (ref 40–129)
ALT SERPL-CCNC: 15 U/L (ref 10–40)
ANION GAP SERPL CALCULATED.3IONS-SCNC: 6 MMOL/L (ref 4–16)
AST SERPL-CCNC: 17 IU/L (ref 15–37)
BASOPHILS ABSOLUTE: 0 K/CU MM
BASOPHILS RELATIVE PERCENT: 0.5 % (ref 0–1)
BILIRUB SERPL-MCNC: 0.2 MG/DL (ref 0–1)
BUN BLDV-MCNC: 13 MG/DL (ref 6–23)
CALCIUM SERPL-MCNC: 9.5 MG/DL (ref 8.3–10.6)
CHLORIDE BLD-SCNC: 107 MMOL/L (ref 99–110)
CO2: 25 MMOL/L (ref 21–32)
CREAT SERPL-MCNC: 0.7 MG/DL (ref 0.6–1.1)
DIFFERENTIAL TYPE: ABNORMAL
EOSINOPHILS ABSOLUTE: 0.3 K/CU MM
EOSINOPHILS RELATIVE PERCENT: 5 % (ref 0–3)
GFR AFRICAN AMERICAN: >60 ML/MIN/1.73M2
GFR NON-AFRICAN AMERICAN: >60 ML/MIN/1.73M2
GLUCOSE BLD-MCNC: 103 MG/DL (ref 70–99)
HCT VFR BLD CALC: 30.2 % (ref 37–47)
HEMOGLOBIN: 9.5 GM/DL (ref 12.5–16)
IMMATURE NEUTROPHIL %: 0.3 % (ref 0–0.43)
LYMPHOCYTES ABSOLUTE: 1.2 K/CU MM
LYMPHOCYTES RELATIVE PERCENT: 20.9 % (ref 24–44)
MCH RBC QN AUTO: 27.1 PG (ref 27–31)
MCHC RBC AUTO-ENTMCNC: 31.5 % (ref 32–36)
MCV RBC AUTO: 86.3 FL (ref 78–100)
MONOCYTES ABSOLUTE: 0.4 K/CU MM
MONOCYTES RELATIVE PERCENT: 6.6 % (ref 0–4)
NUCLEATED RBC %: 0 %
PDW BLD-RTO: 14.1 % (ref 11.7–14.9)
PLATELET # BLD: 277 K/CU MM (ref 140–440)
PMV BLD AUTO: 9.7 FL (ref 7.5–11.1)
POTASSIUM SERPL-SCNC: 4 MMOL/L (ref 3.5–5.1)
RBC # BLD: 3.5 M/CU MM (ref 4.2–5.4)
SEGMENTED NEUTROPHILS ABSOLUTE COUNT: 3.9 K/CU MM
SEGMENTED NEUTROPHILS RELATIVE PERCENT: 66.7 % (ref 36–66)
SODIUM BLD-SCNC: 138 MMOL/L (ref 135–145)
TOTAL IMMATURE NEUTOROPHIL: 0.02 K/CU MM
TOTAL NUCLEATED RBC: 0 K/CU MM
TOTAL PROTEIN: 6.3 GM/DL (ref 6.4–8.2)
WBC # BLD: 5.8 K/CU MM (ref 4–10.5)

## 2022-10-11 PROCEDURE — 80053 COMPREHEN METABOLIC PANEL: CPT

## 2022-10-11 PROCEDURE — 96374 THER/PROPH/DIAG INJ IV PUSH: CPT

## 2022-10-11 PROCEDURE — 73562 X-RAY EXAM OF KNEE 3: CPT

## 2022-10-11 PROCEDURE — 99284 EMERGENCY DEPT VISIT MOD MDM: CPT

## 2022-10-11 PROCEDURE — 96372 THER/PROPH/DIAG INJ SC/IM: CPT

## 2022-10-11 PROCEDURE — 93971 EXTREMITY STUDY: CPT

## 2022-10-11 PROCEDURE — 6360000002 HC RX W HCPCS: Performed by: EMERGENCY MEDICINE

## 2022-10-11 PROCEDURE — 85025 COMPLETE CBC W/AUTO DIFF WBC: CPT

## 2022-10-11 PROCEDURE — 2580000003 HC RX 258: Performed by: EMERGENCY MEDICINE

## 2022-10-11 RX ORDER — HEPARIN SODIUM (PORCINE) LOCK FLUSH IV SOLN 100 UNIT/ML 100 UNIT/ML
500 SOLUTION INTRAVENOUS ONCE
Status: COMPLETED | OUTPATIENT
Start: 2022-10-11 | End: 2022-10-11

## 2022-10-11 RX ORDER — FENTANYL CITRATE 50 UG/ML
50 INJECTION, SOLUTION INTRAMUSCULAR; INTRAVENOUS ONCE
Status: COMPLETED | OUTPATIENT
Start: 2022-10-11 | End: 2022-10-11

## 2022-10-11 RX ORDER — 0.9 % SODIUM CHLORIDE 0.9 %
1000 INTRAVENOUS SOLUTION INTRAVENOUS ONCE
Status: COMPLETED | OUTPATIENT
Start: 2022-10-11 | End: 2022-10-11

## 2022-10-11 RX ORDER — PROMETHAZINE HYDROCHLORIDE 25 MG/ML
25 INJECTION, SOLUTION INTRAMUSCULAR; INTRAVENOUS ONCE
Status: COMPLETED | OUTPATIENT
Start: 2022-10-11 | End: 2022-10-11

## 2022-10-11 RX ORDER — PROMETHAZINE HYDROCHLORIDE 25 MG/1
25 TABLET ORAL EVERY 6 HOURS PRN
Qty: 12 TABLET | Refills: 0 | Status: SHIPPED | OUTPATIENT
Start: 2022-10-11 | End: 2022-10-18

## 2022-10-11 RX ADMIN — PROMETHAZINE HYDROCHLORIDE 25 MG: 25 INJECTION INTRAMUSCULAR; INTRAVENOUS at 19:03

## 2022-10-11 RX ADMIN — HEPARIN 500 UNITS: 100 SYRINGE at 20:02

## 2022-10-11 RX ADMIN — FENTANYL CITRATE 50 MCG: 50 INJECTION, SOLUTION INTRAMUSCULAR; INTRAVENOUS at 17:57

## 2022-10-11 RX ADMIN — SODIUM CHLORIDE 1000 ML: 9 INJECTION, SOLUTION INTRAVENOUS at 17:57

## 2022-10-11 ASSESSMENT — ENCOUNTER SYMPTOMS
SORE THROAT: 0
BACK PAIN: 0
VOICE CHANGE: 0
SHORTNESS OF BREATH: 0
COUGH: 0
NAUSEA: 0
COLOR CHANGE: 0
WHEEZING: 0
VOMITING: 0

## 2022-10-11 ASSESSMENT — PAIN DESCRIPTION - ORIENTATION: ORIENTATION: LEFT

## 2022-10-11 ASSESSMENT — PAIN DESCRIPTION - DESCRIPTORS: DESCRIPTORS: STABBING

## 2022-10-11 ASSESSMENT — PAIN DESCRIPTION - FREQUENCY: FREQUENCY: CONTINUOUS

## 2022-10-11 ASSESSMENT — PAIN SCALES - GENERAL
PAINLEVEL_OUTOF10: 7
PAINLEVEL_OUTOF10: 8

## 2022-10-11 ASSESSMENT — PAIN DESCRIPTION - PAIN TYPE: TYPE: ACUTE PAIN;SURGICAL PAIN

## 2022-10-11 ASSESSMENT — PAIN DESCRIPTION - LOCATION: LOCATION: LEG;KNEE

## 2022-10-11 NOTE — PROGRESS NOTES
Andre Pope (1970)    Daily Progress Note-  Elderaleciasierra Crisostomo DO,                  Today's Date:   10/11/2022          Subjective:     Patient seen and examined, resting comfortably in bedside chair in her room located in the emergency room. Patient had called my office yesterday due to issues with nausea and vomiting and continued knee pain. She had no new symptoms in regards to the left knee but continued pain and was concerned due to her constitutional symptoms. She was told by us to go to the emergency room and she finally presents to the emergency room today. Her pain is much better controlled this time. Her knee looks excellent and she does feel that it is getting somewhat better. There is no redness or swelling at this time. She does have improved knee range of motion. Compressive dressing not in place at this time but is at bedside. ESR and CRP were obtained yesterday by another physician's office. ESR remains downtrending. CRP did slightly increase. I was contacted by the emergency room physician this evening in regards to this patient. She had no true concerns in regards to the left knee but wanted to know if any treatment was needed. I just finished cases and let them know I would see this patient. Objective:     Vitals:    10/11/22 1900   BP: (!) 151/103   Pulse: 75   Resp: 17   Temp:    SpO2: 99%        Review of Systems   Constitutional:  Positive for activity change. Negative for fatigue and fever. HENT:  Negative for sneezing, sore throat and voice change. Respiratory:  Negative for cough, shortness of breath and wheezing. Cardiovascular:  Negative for leg swelling. Gastrointestinal:  Negative for nausea and vomiting. Musculoskeletal:  Positive for arthralgias, joint swelling and myalgias.  Negative for back pain, gait problem, neck pain and neck stiffness. Skin:  Negative for color change, rash and wound. Neurological:  Negative for weakness and numbness. Psychiatric/Behavioral:  Negative for behavioral problems, confusion and self-injury. Physical Exam:     The patient is awake and alert  Resting comfortably in bedside chair    Operative extremity:    No dressing in place. Portal sites are well-healed with no drainage   No effusion palpable. No erythema, warmth. Minimally tender to palpation with no significant no crepitance with gentle range of motion  Sensation and motor function intact distally. Patient wiggles toes without difficulty  Guarded range of motion of knee at this time, improved from when she was seen previously. Active range of motion intact. I was able to passively get her knee to 90 degrees with moderate pain   compartments soft, compressible    LABS   CBC:   Recent Labs     10/10/22  0901 10/11/22  1800   WBC 6.1 5.8   HGB 9.6* 9.5*    277       CRP uptrending (18.8 on 10/10/2022 compared to 9.9 on 10/2/2022 compared to 19.7 on 9/27/2022)    ESR downtrending (28 on 10/10/2022 compared to 34 on 10/2/2022 compared to 60 on 9/24/2022)      No growth on any of the previous aspirations including intraoperative. Wound cultures taken on 9- from the portal sites grew mixed skin jimmy. IMAGING     3 views left knee demonstrate:  1. No acute fracture. 2. Nonspecific left knee effusion. 3. Mild-to-moderate tricompartmental osteoarthritis. Duplex venous ultrasound of the left lower extremity demonstrates:  1. No evidence for DVT. 2. Complex collection in the prepatellar region may reflect prepatellar   bursitis or possible hematoma. Assessment and Plan     1. POD #21 s/p left knee arthroscopic irrigation and debridement    1:  Physical therapy consult for mobilization   -WBAT   -No precautions with the exception of no deep knee bending and no soaking of the wound.   2:  DVT prophylaxis   -Continue DVT prophylaxis per hospitalist  3:  Continue Pain Control   -wean to PO meds  4. Medical management per hospitalist service   -Continue antibiotic treatment per infectious disease plan    -No recommendations at this time from myself about specific antibiotics  5. I had a thorough conversation with the patient regarding her left knee. I explained that there are no concerning findings at this time and she actually looks greatly improved from when I last saw her. There is no need for any further orthopedic intervention and I would recommend against any type of aspiration as the knee does not demonstrate any concerning findings for infection. I spent that she should continue to use ice and compression. I did however explained that she should focus less on keeping the leg elevated and more and range of motion and explained that she can use a towel to help do knee flexion and I demonstrated this for her this evening. I will reach out to my office staff to move back her next appointment since I am seeing her tonight. No further orthopedic intervention plan at this time. Orthopedics to sign off.      Annette Walter, DO

## 2022-10-11 NOTE — ED PROVIDER NOTES
Emergency Department Encounter    Patient: Tomas Boone  MRN: [id number]  : 1970  Date of Evaluation: 10/11/2022  ED Provider:  Dean Hogue DO    Triage Chief Complaint:   Leg Swelling (States had surgery x2 weeks ago L leg to have knee fluid drained. Has hx of blood clots in legs) and Post-op Problem    [place]:  Tomas Boone is a 46 y.o. female that presents to the emergency department for increased pain and swelling in her left knee. Patient states she has had her left knee drained multiple times by orthopedist.  Patient states she is on IV antibiotics at home. Patient states she has a port to the left anterior chest.  Patient states pain is gotten severe and the swelling in difficulty ambulating or moving the left knee. She states no falls injury or trauma since the last drainage. She states history of blood clot and is concerned. Patient states she is on Percocet and Phenergan at home. She states she is still vomiting so she came in for evaluation. Patient denies any chest pain shortness breath fever chills cough sore throat runny nose earache no numbness and tingling in extremities, no abdominal pain no sick contacts. Mike Qiu     ROS - see HPI, below listed is current ROS at time of my eval:  General:  No fevers, no chills, no weakness  Eyes:  No recent vison changes, no discharge  ENT:  No sore throat, no nasal congestion, no hearing changes  Cardiovascular:  No chest pain, no palpitations  Respiratory:  No shortness of breath, no cough, no wheezing  Gastrointestinal:  No pain, no nausea, no vomiting, no diarrhea  Musculoskeletal: Positive for chronic left knee pain and swelling, no muscle pain, no joint pain  Skin:  No rash, no pruritis, no easy bruising  Neurologic:  No speech problems, no headache, no extremity numbness, no extremity tingling, no extremity weakness  Psychiatric:  No anxiety  Genitourinary:  No dysuria, no hematuria  Endocrine:  No unexpected weight gain, no unexpected weight loss  Extremities:  no edema, no pain    Past Medical History:   Diagnosis Date    Acid reflux     Anemia     Anxiety     Arthritis     Hands, Back And Ankles    Arthritis     Bleeding ulcer 2014    \"I Had Ulcers In My Stomach And Colon\"/ per pt on 8/12/2019\"they said recently having some blood in my stomach- in July ( 2019)could not find where coming from \"    Bronchitis Last Episode 2014    Chronic back pain     Chronic pain     [facility]    COPD (chronic obstructive pulmonary disease) ([facility] [address]     Sees Dr. Blane Zhou    Depression     Disease of blood and blood forming organ     Fibromyalgia Dx 2013    GERD (gastroesophageal reflux disease)     H/O echocardiogram 08/11/2015    EF >55%. LA to be at the upper limit of normal in size. LV hypertrophy with normal LV systolic, but abnormal diastolic function. Normal valvular structures and function. H/O echocardiogram 08/30/2018    EF 55-60%    Hiatal hernia     History of blood transfusion 09/2015 And 2018    No Reaction To Blood Transfusions Received    History of sleeve gastrectomy     [place] (hard of hearing)     Right Ear    Hx of cardiac catheterization 10/24/2010    Angiographically normal coronary arteries w/ normal LV function and wall motion. Hx of transesophageal echocardiography (PILO) for monitoring 12/02/2010    EF 55-60%. WNL.     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 name    Hypertension     Immune deficiency disorder [facility])     Kidney cysts     \"they found that I have a kidney cyst but not sure which side\" per pt on 7/15/2020    Kidney stone     Lupus ([facility] [address] Dx 2013    \"Rheumatoid Lupus\"    MDRO (multiple drug resistant organisms) resistance     4 months ago chest from [facility]    Morbid obesity ([address]     MRSA bacteremia     Nausea     \"Most Of The Time\"    Osteomyelitis of fifth toe of left foot (HCC)     Pain management     Sees Dr. Ren Ward, Once A Month    Pancreatitis chronic Dx 2001    Pneumonia Dx 11-15    Seizures (HCC)     Shortness of breath on exertion     Shortness of breath on exertion     Sleep apnea     Has CPAP\"no longer use the cpap since lost weight\"    Staph infection Dx 11-15    Left Foot    Staph infection Dx 11-15    \"Left Foot\"    Teeth missing     Upper And Lower    Thyroid disease     Wears glasses     To Read     Past Surgical History:   Procedure Laterality Date    ABDOMEN SURGERY      APPENDECTOMY  02/1998    Done When Tubes And Ovaries Were Removed    APPENDECTOMY      CARDIAC CATHETERIZATION  10/24/2010    CATHETER REMOVAL N/A 7/16/2019    PORT REMOVAL performed by Jeff Levy MD at [address] [place] [address]  08/27/1991    CHOLECYSTECTOMY      CHOLECYSTECTOMY, LAPAROSCOPIC  1990's    COLONOSCOPY  Last Done In 2000's    DENTAL SURGERY      Teeth Extracted In Past    ENDOSCOPY, COLON, DIAGNOSTIC  Last Done In 2018    FOOT DEBRIDEMENT Left 6/16/2019    FIRST METATARSAL DEBRIDEMENT INCISION AND DRAINAGE. EXCISION OF ULCER.  RESECTION OF BONE. 1ST METATARSAL POWER LAVAGE AND BONE CEMENT performed by Terri Hayward DPM at [address] [place] [address] Left 4/15/2022    LEFT FOOT ABSCESS DEBRIDEMENT INCISION AND DRAINAGE, CYST REMOVAL performed by Terri Hayward DPM at [address] [facility] Left Last Done In 2016     Dr. Leonarda Youssef, \" About 6 Surgeries Done Because Of Staph Infection\"    HERNIA REPAIR      HIATAL HERNIA REPAIR N/A 2/12/2019    HERNIA HIATAL LAPAROSCOPIC ROBOTIC performed by Jeff Levy MD at [place] [address] ([facility])  10/1997    Partial Abdominal Hysterectomy    HYSTERECTOMY (CERVIX STATUS UNKNOWN)      INSERTION / REMOVAL / REPLACEMENT VENOUS ACCESS CATHETER N/A 8/15/2019    PORT INSERTION performed by Merlinda Saner, MD at [facility]. ARTHROSCOPY Left 9/20/2022    LEFT KNEE ARTHROSCOPIC IRRIGATION AND DEBRIDEMENT WITH DRAIN PLACEMENT performed by Honey Haque DO at [address] [place] [address]    removed after 6 months    LEG BIOPSY EXCISION Left 3/8/2021    LEFT THIGH LESION BIOPSY EXCISION performed by Merlinda Saner, MD at [address] [facility] [place], PARTIAL Left 2008    Benign    OTHER SURGICAL HISTORY  02/1998    \"Tubes And Ovaries Removed, Appendectomy Also Done\"    OTHER SURGICAL HISTORY  Last Done 7-15-16    Mediport Insertion \"Total Of Six Done, Removed Last Mediport In 2014\"    PORT SURGERY N/A 1/15/2020    PORT REMOV[facility] N/A 7/6/2020    PORT INSERTION performed by Merlinda Saner, MD at [address] Left 8/3/2021    MEDIPORT REPLACEMENT performed by Merlinda Saner, MD at [address] [facility] N/A 8/16/2021    GASTRIC BYPASS RUFINO-EN-Y LAPAROSCOPIC ROBOTIC, LYSIS OF ADHESIONS performed by Merlinda Saner, MD at [facility] N/A 2/12/2019    GASTRECTOMY SLEEVE LAPAROSCOPIC ROBOTIC performed by Merlinda Saner, MD at [address] [place] [address]  08/27/2018    UPPER GASTROINTESTINAL ENDOSCOPY N/A 4/2/2019    EGD CONTROL HEMORRHAGE with epi injection at bleeding site performed by Merlinda Saner, MD at [address] [place] [address] 6/19/2019    EGD DIAGNOSTIC ONLY performed by Merlinda Saner, MD at [place] [address] N/A 7/22/2019    EGD DIAGNOSTIC ONLY performed by Sandi Pierce MD at [address] [place] [address] 10/14/2019    EGD DIAGNOSTIC ONLY performed by Merlinda Saner, MD at [address] [place] [address] 7/17/2020    EGJ DILATATION BALLOON WITH 18-20 MM BALLOON, DILATED TO 20 MM. performed by Cory Yu MD at [address] [place] [address] 5/28/2021    EGD BIOPSY performed by Cory Yu MD at [facility]       Family History   Problem Relation Age of Onset    Diabetes Mother         \"Borderline Diabetes\"    High Blood Pressure Mother     Obesity Mother     Arthritis Mother     Heart Disease Mother     High Cholesterol Mother     Vision Loss Mother     Diabetes Father     High Blood Pressure Father     Asthma Father     Cancer Father         prostate cancer    High Blood Pressure Brother     Asthma Son     Vision Loss Son     Lupus Daughter     Other Daughter         \"Alot Of Female Problems\"     Social History     Socioeconomic History    Marital status:      Spouse name: Not on file    Number of children: Not on file    Years of education: Not on file    Highest education level: Not on file   Occupational History    Not on file   Tobacco Use    Smoking status: Never    Smokeless tobacco: Never   Vaping Use    Vaping Use: Never used   Substance and Sexual Activity    Alcohol use: Never    Drug use: Never    Sexual activity: Not Currently   Other Topics Concern    Not on file   Social History Narrative    ** Merged History Encounter **          Social Determinants of Health     Financial Resource Strain: Low Risk     Difficulty of Paying Living Expenses: Not hard at all   Food Insecurity: No Food Insecurity    Worried About Running Out of Food in the Last Year: Never true    Ran Out of Food in the Last Year: Never true   Transportation Needs: Not on file   Physical Activity: Not on file   Stress: Not on file   Social Connections: Not on file   Intimate Partner Violence: Not on file   Housing Stability: Not on file     Current Facility-Administered Medications   Medication Dose Route Frequency Provider Last Rate Last Admin    promethazine (PHENERGAN) injection 25 mg  25 mg IntraMUSCular Once Daisy Marie, DO        0.9 % sodium chloride bolus  1,000 mL IntraVENous Once Lexieliz Khan, DO 1,000 mL/hr at 10/11/22 1757 1,000 mL at 10/11/22 1757     Current Outpatient Medications   Medication Sig Dispense Refill    predniSONE (DELTASONE) 10 MG tablet 4 tablets once daily for 2 days then 3 tablets once daily for 2 days then 2 tablets once daily for 2 days then 1 tablet once daily for 2 days. 20 tablet 0    cefTRIAXone (ROCEPHIN) infusion Infuse 1,000 mg intravenously every 24 hours Compound per protocol 42 g 0    promethazine (PHENERGAN) 25 MG tablet Take 1 tablet by mouth every 6 hours as needed for Nausea 20 tablet 0    pantoprazole (PROTONIX) 40 MG tablet Take 1 tablet by mouth twice daily 180 tablet 0    hydrOXYzine HCl (ATARAX) 50 MG tablet Take 50 mg by mouth 2 times daily      gabapentin (NEURONTIN) 800 MG tablet Take 800 mg by mouth 3 times daily.       vitamin D (CHOLECALCIFEROL) 25 MCG (1000 UT) TABS tablet Take 1,000 Units by mouth daily      estradiol (ESTRACE) 0.5 MG tablet Take 0.5 mg by mouth daily      vitamin B-12 (CYANOCOBALAMIN) 500 MCG tablet Take 500 mcg by mouth daily      levETIRAcetam (KEPPRA) 1000 MG tablet Take 1,000 mg by mouth 2 times daily      Pantoprazole Sodium (PROTONIX PO) Take 40 mg by mouth 2 times daily      PAROXETINE HCL PO Take 60 mg by mouth daily      tiZANidine (ZANAFLEX) 4 MG tablet Take 4 mg by mouth as needed      scopolamine (TRANSDERM-SCOP) transdermal patch Place 1 patch onto the skin every 72 hours      melatonin 10 MG CAPS capsule Take 10 mg by mouth nightly      levothyroxine (SYNTHROID) 125 MCG tablet Take 125 mcg by mouth Daily      ondansetron (ZOFRAN-ODT) 4 MG disintegrating tablet DISSOLVE 1 TABLET IN MOUTH EVERY 8 HOURS AS NEEDED FOR NAUSEA 20 tablet 0    estradiol (ESTRACE) 0.5 MG tablet Take 1 tablet by mouth once daily 30 tablet 0    PARoxetine (PAXIL) 30 MG tablet TAKE 1 & 1/2 (ONE & ONE-HALF) TABLETS BY MOUTH NIGHTLY 135 tablet 0 ferrous sulfate (IRON 325) 325 (65 Fe) MG tablet Take 1 tablet by mouth 2 times daily 180 tablet 1    albuterol sulfate HFA (PROVENTIL;VENTOLIN;PROAIR) 108 (90 Base) MCG/ACT inhaler Inhale 2 puffs into the lungs 4 times daily 1 each 3    hydrOXYzine pamoate (VISTARIL) 50 MG capsule Take 1 capsule by mouth 2 times daily 30 capsule 2    levothyroxine (SYNTHROID) 125 MCG tablet Take 1 tablet by mouth Daily 30 tablet 2    levETIRAcetam (KEPPRA) 1000 MG tablet Take 1 tablet by mouth twice daily 60 tablet 3    benzocaine (ORAJEL) 10 % mucosal gel Apply to the ulcer 2-3 times a day 1 each 0    albuterol (PROVENTIL) (2.5 MG/3ML) 0.083% nebulizer solution Take 3 mLs by nebulization every 6 hours as needed for Wheezing 120 each 0    oxyCODONE-acetaminophen (PERCOCET) 5-325 MG per tablet TAKE 1 TABLET BY MOUTH EVERY 6 HOURS AS NEEDED FOR 28 DAYS      NARCAN 4 MG/0.1ML LIQD nasal spray USE AS DIRECTED AS NEEDED FOR SUSPECTED OPIOID OVERDOSE      scopolamine (TRANSDERM-SCOP) transdermal patch APPLY 1 PATCH TRANSDERMALLY TO THE SKIN BEHIND THE EAR ONCE EVERY 3 DAYS AS NEEDED 10 patch 0    Melatonin 10 MG TABS Take 10-20 mg by mouth nightly as needed      Cyanocobalamin (VITAMIN B 12 PO) Take 1 tablet by mouth daily      meclizine (ANTIVERT) 25 MG tablet Take 25 mg by mouth 3 times daily as needed for Dizziness      betamethasone valerate (VALISONE) 0.1 % ointment APPLY OINTMENT TO AFFECTED AREA TWICE DAILY TO HANDS AND ELBOWS FOR 21 DAYS      potassium gluconate 550 mg tablet Take 1 tablet by mouth daily      tiZANidine (ZANAFLEX) 4 MG tablet Take 4 mg by mouth every 8 hours as needed       Cholecalciferol (VITAMIN D3) 5000 units TABS Take 1 tablet by mouth daily      Multiple Vitamin (MVI, BARIATRIC ADVANTAGE MULTI-FORMULA, CHEW TAB) Take 1 tablet by mouth daily 30 tablet 5    Calcium Citrate-Vitamin D (CALCIUM + VIT D, BARIATRIC ADVANTAGE, CHEWABLE TABLET) Take 1 tablet by mouth 2 times daily 120 tablet 5    gabapentin nonlabored. Abdominal:  Normal bowel sounds. Soft. Nontender. Non distended. Back:  No CVA tenderness to palpation     Neurological:  Alert and oriented times 3. No focal neuro deficits.              Psychiatric:  Appropriate    I have reviewed and interpreted all of the currently available lab results from this visit (if applicable):  Results for orders placed or performed during the hospital encounter of 10/11/22   CBC with Auto Differential   Result Value Ref Range    WBC 5.8 4.0 - 10.5 K/CU MM    RBC 3.50 (L) 4.2 - 5.4 M/CU MM    Hemoglobin 9.5 (L) 12.5 - 16.0 GM/DL    Hematocrit 30.2 (L) 37 - 47 %    MCV 86.3 78 - 100 FL    MCH 27.1 27 - 31 PG    MCHC 31.5 (L) 32.0 - 36.0 %    RDW 14.1 11.7 - 14.9 %    Platelets 529 581 - 569 K/CU MM    MPV 9.7 7.5 - 11.1 FL    Differential Type AUTOMATED DIFFERENTIAL     Segs Relative 66.7 (H) 36 - 66 %    Lymphocytes % 20.9 (L) 24 - 44 %    Monocytes % 6.6 (H) 0 - 4 %    Eosinophils % 5.0 (H) 0 - 3 %    Basophils % 0.5 0 - 1 %    Segs Absolute 3.9 K/CU MM    Lymphocytes Absolute 1.2 K/CU MM    Monocytes Absolute 0.4 K/CU MM    Eosinophils Absolute 0.3 K/CU MM    Basophils Absolute 0.0 K/CU MM    Nucleated RBC % 0.0 %    Total Nucleated RBC 0.0 K/CU MM    Total Immature Neutrophil 0.02 K/CU MM    Immature Neutrophil % 0.3 0 - 0.43 %   Comprehensive Metabolic Panel   Result Value Ref Range    Sodium 138 135 - 145 MMOL/L    Potassium 4.0 3.5 - 5.1 MMOL/L    Chloride 107 99 - 110 mMol/L    CO2 25 21 - 32 MMOL/L    BUN 13 6 - 23 MG/DL    Creatinine 0.7 0.6 - 1.1 MG/DL    Glucose 103 (H) 70 - 99 MG/DL    Calcium 9.5 8.3 - 10.6 MG/DL    Albumin 3.7 3.4 - 5.0 GM/DL    Total Protein 6.3 (L) 6.4 - 8.2 GM/DL    Total Bilirubin 0.2 0.0 - 1.0 MG/DL    ALT 15 10 - 40 U/L    AST 17 15 - 37 IU/L    Alkaline Phosphatase 104 40 - 129 IU/L    GFR Non-African American >60 >60 mL/min/1.73m2    GFR African American >60 >60 mL/min/1.73m2    Anion Gap 6 4 - 16      Radiographs (if obtained):  Radiologist's Report Reviewed:  XR KNEE LEFT (3 VIEWS)   Final Result   1. No acute fracture. 2. Nonspecific left knee effusion. 3. Mild-to-moderate tricompartmental osteoarthritis. VL DUP LOWER EXTREMITY VENOUS LEFT   Final Result   1. No evidence for DVT. 2. Complex collection in the prepatellar region may reflect prepatellar   bursitis or possible hematoma. EKG (if obtained): (All EKG's are interpreted by myself in the absence of a cardiologist)      Medications   promethazine (PHENERGAN) injection 25 mg (25 mg IntraMUSCular Given 10/11/22 1903)   0.9 % sodium chloride bolus (1,000 mLs IntraVENous New Bag 10/11/22 1757)   fentaNYL (SUBLIMAZE) injection 50 mcg (50 mcg IntraVENous Given 10/11/22 1757)         MDM:  Patient presents to the emergency department complaining of left knee pain and swelling decreased range of motion secondary to pain and swelling. History of arthrocentesis to the left knee x2 by orthopedist Dr. Nina Beatty. Patient on home IV antibiotics due to infection of the left knee. Patient states symptoms are getting worse. Patient here for evaluation. Patient ordered basic laboratory studies left knee x-ray and venous Doppler of the left lower extremity. Patient complains of pain was ordered fentanyl and Phenergan. Pain medication did improve patient's symptoms. Left knee x-ray shows no acute fracture, nonspecific left knee effusion, mild to moderate tricompartmental osteoarthritis. Venous Doppler left lower extremity shows no evidence of DVT, collection complex in the prepatellar region may reflect prepatellar bursitis or possible hematoma. I did consult orthopedic surgeon Dr. Nina Beatty he did come down to evaluate this patient. Patient can be discharged home. Patient did ask for feeding and for discharge. Patient will be given prescription for Phenergan. She continue home Percocet rest ice Ace wrap to the left knee.   She is to follow-up with orthopedic and family doctor as directed. Patient is return immediately for any worsening symptoms. All questions answered. Clinical Impression:  1. Pain and swelling of left knee    2. Tricompartment osteoarthritis of left knee      Disposition referral (if applicable):  Panda Saunders DO  [address]  [phone]    Schedule an appointment as soon as possible for a visit in 3 days  As needed, For wound re-check    Samir Solitario MD  [facility]  [address] [place] [address] [phone]    Schedule an appointment as soon as possible for a visit in 2 days  If symptoms worsen    [facility] Emergency Department  [address] [facility]  [phone]  Go in 1 day  If symptoms worsen  Disposition medications (if applicable):  New Prescriptions    PROMETHAZINE (PHENERGAN) 25 MG TABLET    Take 1 tablet by mouth every 6 hours as needed for Nausea     ED Provider Disposition Time  DISPOSITION  7:45 PM        Comment: Please note this report has been produced using speech recognition software and may contain errors related to that system including errors in grammar, punctuation, and spelling, as well as words and phrases that may be inappropriate. Efforts were made to edit the dictations.         Syed Sapp DO  10/11/22 1946

## 2022-10-11 NOTE — DISCHARGE INSTRUCTIONS
Follow-up with orthopedic surgeon as directed. Rest ice elevation compression to the left knee  Continue home Percocet as prescribed and directed  Take Phenergan as needed for nausea vomiting  Return to the emergency department AMI increased pain fever chills nausea vomiting dizzy lightheadedness or worsening symptoms.

## 2022-10-11 NOTE — CARE COORDINATION
Pt noted for possible readmission. Pt was recently admitted 9/19-10/5 for DVT of femoral veins. Pt had a arthroscopic irrigation and debridement with drain placement on 9/20 left knee. Pt was d/c on iv antibiotics with [address] [place] [address] Rd. Pt lives in a one story home with mother. Pt noted to be independent in ADL's, has insurance and PCP. A script was written for a wheel walker but denied by insurance due to receiving on in 2019. Pt returns today with c/o leg swelling and pain in her left knee. Pt was evaluated and d/c home to follow up with ortho and complete treatment plan already in place.

## 2022-10-11 NOTE — ED NOTES
Pt was given fentanyl at 1757. Pt reports no decrease in pain. Primary RN and attending made aware.     Tad Oneal, KEDAR     [facility]  10/11/22 4763

## 2022-10-12 ENCOUNTER — TELEPHONE (OUTPATIENT)
Dept: INTERNAL MEDICINE CLINIC | Age: 52
End: 2022-10-12

## 2022-10-12 NOTE — TELEPHONE ENCOUNTER
----- Message from Robyn Alcala sent at 10/12/2022 10:32 AM EDT -----  Subject: Hospital Follow Up    QUESTIONS  What hospital was the Patient Discharged from? [facility])   Date of Discharge? 2022-10-05  Discharge Location? Home  Reason for hospitalization as patient stated? Blood clot and clean out   knee . She went to the ED 10/11 as well   What question does the patient have, if applicable? She is also wanting to   come in for a medication refill follow up as well.   ---------------------------------------------------------------------------  --------------  CALL BACK INFO  What is the best way for the office to contact you? OK to leave message on   voicemail  Preferred Call Back Phone Number? [phone]  ---------------------------------------------------------------------------  --------------  SCRIPT ANSWERS  Relationship to Patient?  Self

## 2022-10-16 PROBLEM — M00.9 SEPTIC ARTHRITIS OF KNEE, LEFT (HCC): Status: ACTIVE | Noted: 2022-10-16

## 2022-10-16 NOTE — PROGRESS NOTES
10/20/2022         Referring Physician: No ref. provider found  Primary Care Physician: Silvia Mcadams MD    Impression/Plan:   Diagnosis Orders   1. Receiving intravenous antibiotic treatment as outpatient  CBC    Basic Metabolic Panel    C-Reactive Protein    Sedimentation Rate    Hepatic Function Panel      2. Arthritis of left knee due to other bacteria ([address]        3. Nausea            Discussion:  Septic arthritis of knee, left (HCC)  Presumed septic arthritis as Cutibacterium acne was isolated from broth. Continues to have pain in her knee. Suspect that pain is due to osteoarthritis. Continue ceftriaxone to complete 6-week course. Return in about 2 weeks (around 11/2/2022). 40 minutes was spent in the encounter; more than 50% of the face-to-face time was spent with the patient providing counseling and coordination of care. History: Vonna Mcardle is a 46 y.o.  female presenting today for follow up. She was seen by the ID service during her  16-day stay (9/19/2022 - 10/5/2022) in [facility]. A clinical christopher of possible left knee septic arthritis; she underwent a left knee arthroscopic irrigation and debridement with drain placement on 9/20/2022. Cutibacterium acnes was isolated from broth. She was discharged to complete a 6 week course of ceftriaxone on 11/15/2022. She continues to have left knee pain and intermittent swelling. Worsened when she bears weight on it  She has nausea Zofran ODT not helping her, reports that it gives her a headache. Denies diarrhea. Endorses chronic nausea. Review of Systems   All other systems reviewed and are negative.     Allergies   Allergen Reactions    Aspirin Palpitations     \"My Heart Rate Elevates\"    Compazine [Prochlorperazine] Rash    Shellfish-Derived Products Swelling    Toradol [Ketorolac Tromethamine] Rash    Haldol [Haloperidol] Other (See Comments)     States \"shook severely and had to have [employer] [Aspirin]     Compazine [Prochlorperazine]     Haldol [Haloperidol]      Shaking      Reglan [Metoclopramide] Itching    Reglan [Metoclopramide]     Toradol [Ketorolac Tromethamine]        Patient Active Problem List   Diagnosis    Fibromyalgia    Lupus (systemic lupus erythematosus) ([facility])    Chronic pancreatitis ([facility])    HTN (hypertension)    Frequent UTI    Gastroesophageal reflux disease without esophagitis    Chronic depression    Fatty liver disease, nonalcoholic    Arthritis    Bilateral low back pain with left-sided sciatica    Hiatal hernia    Chronic pain syndrome    Drug-seeking/Aberrant behavior    Receiving intravenous antibiotic treatment as outpatient    Lymph node disorder    Morbid obesity with BMI of 40.0-44.9, adult ([facility])    Iron deficiency anemia    History of Jet-en-Y gastric bypass    Anxiety    RUQ pain    Discoloration of skin of flank resembling ecchymosis    Chest pain of uncertain etiology    Cellulitis of left thigh    Malabsorption    COPD (chronic obstructive pulmonary disease) ([facility])    Nausea    Hypothyroidism due to acquired atrophy of thyroid    Vitamin D deficiency    Irritable bowel syndrome    Malabsorption due to intolerance, not elsewhere classified    Port-A-Cath in place    Pseudoseizures [facility])    Diarrhea    Myalgia    Acute bilateral deep vein thrombosis (DVT) of femoral veins ([facility])    Acute deep vein thrombosis (DVT) of popliteal vein of left lower extremity ([facility])    Swelling of joint of left knee    Septic arthritis of knee, left ([facility])       Current Outpatient Medications   Medication Sig Dispense Refill    promethazine (PHENERGAN) 25 MG tablet Take 1 tablet by mouth 4 times daily as needed for Nausea 20 tablet 0    ondansetron (ZOFRAN-ODT) 4 MG disintegrating tablet Take 1 tablet by mouth 3 times daily as needed for Nausea or Vomiting 21 tablet 0    predniSONE (DELTASONE) 10 MG tablet 4 tablets once daily for 2 days then 3 tablets once daily for 2 days then 2 tablets once daily for 2 days then 1 tablet once daily for 2 days. 20 tablet 0    cefTRIAXone (ROCEPHIN) infusion Infuse 1,000 mg intravenously every 24 hours Compound per protocol 42 g 0    pantoprazole (PROTONIX) 40 MG tablet Take 1 tablet by mouth twice daily 180 tablet 0    hydrOXYzine HCl (ATARAX) 50 MG tablet Take 50 mg by mouth 2 times daily      gabapentin (NEURONTIN) 800 MG tablet Take 800 mg by mouth 3 times daily.       vitamin D (CHOLECALCIFEROL) 25 MCG (1000 UT) TABS tablet Take 1,000 Units by mouth daily      estradiol (ESTRACE) 0.5 MG tablet Take 0.5 mg by mouth daily      vitamin B-12 (CYANOCOBALAMIN) 500 MCG tablet Take 500 mcg by mouth daily      levETIRAcetam (KEPPRA) 1000 MG tablet Take 1,000 mg by mouth 2 times daily      Pantoprazole Sodium (PROTONIX PO) Take 40 mg by mouth 2 times daily      PAROXETINE HCL PO Take 60 mg by mouth daily      tiZANidine (ZANAFLEX) 4 MG tablet Take 4 mg by mouth as needed      scopolamine (TRANSDERM-SCOP) transdermal patch Place 1 patch onto the skin every 72 hours      melatonin 10 MG CAPS capsule Take 10 mg by mouth nightly      levothyroxine (SYNTHROID) 125 MCG tablet Take 125 mcg by mouth Daily      estradiol (ESTRACE) 0.5 MG tablet Take 1 tablet by mouth once daily 30 tablet 0    PARoxetine (PAXIL) 30 MG tablet TAKE 1 & 1/2 (ONE & ONE-HALF) TABLETS BY MOUTH NIGHTLY 135 tablet 0    ferrous sulfate (IRON 325) 325 (65 Fe) MG tablet Take 1 tablet by mouth 2 times daily 180 tablet 1    albuterol sulfate HFA (PROVENTIL;VENTOLIN;PROAIR) 108 (90 Base) MCG/ACT inhaler Inhale 2 puffs into the lungs 4 times daily 1 each 3    hydrOXYzine pamoate (VISTARIL) 50 MG capsule Take 1 capsule by mouth 2 times daily 30 capsule 2    levothyroxine (SYNTHROID) 125 MCG tablet Take 1 tablet by mouth Daily 30 tablet 2    levETIRAcetam (KEPPRA) 1000 MG tablet Take 1 tablet by mouth twice daily 60 tablet 3    benzocaine (ORAJEL) 10 % mucosal gel Apply to the ulcer 2-3 times a day 1 each 0    albuterol (PROVENTIL) (2.5 MG/3ML) 0.083% nebulizer solution Take 3 mLs by nebulization every 6 hours as needed for Wheezing 120 each 0    oxyCODONE-acetaminophen (PERCOCET) 5-325 MG per tablet TAKE 1 TABLET BY MOUTH EVERY 6 HOURS AS NEEDED FOR 28 DAYS      NARCAN 4 MG/0.1ML LIQD nasal spray USE AS DIRECTED AS NEEDED FOR SUSPECTED OPIOID OVERDOSE      scopolamine (TRANSDERM-SCOP) transdermal patch APPLY 1 PATCH TRANSDERMALLY TO THE SKIN BEHIND THE EAR ONCE EVERY 3 DAYS AS NEEDED 10 patch 0    Melatonin 10 MG TABS Take 10-20 mg by mouth nightly as needed      Cyanocobalamin (VITAMIN B 12 PO) Take 1 tablet by mouth daily      meclizine (ANTIVERT) 25 MG tablet Take 25 mg by mouth 3 times daily as needed for Dizziness      betamethasone valerate (VALISONE) 0.1 % ointment APPLY OINTMENT TO AFFECTED AREA TWICE DAILY TO HANDS AND ELBOWS FOR 21 DAYS      potassium gluconate 550 mg tablet Take 1 tablet by mouth daily      tiZANidine (ZANAFLEX) 4 MG tablet Take 4 mg by mouth every 8 hours as needed       Cholecalciferol (VITAMIN D3) 5000 units TABS Take 1 tablet by mouth daily      Multiple Vitamin (MVI, BARIATRIC ADVANTAGE MULTI-FORMULA, CHEW TAB) Take 1 tablet by mouth daily 30 tablet 5    Calcium Citrate-Vitamin D (CALCIUM + VIT D, BARIATRIC ADVANTAGE, CHEWABLE TABLET) Take 1 tablet by mouth 2 times daily 120 tablet 5    gabapentin (NEURONTIN) 800 MG tablet Take 800 mg by mouth 3 times daily       No current facility-administered medications for this visit.        Past Medical History:   Diagnosis Date    Acid reflux     Anemia     Anxiety     Arthritis     Hands, Back And Ankles    Arthritis     Bleeding ulcer 2014    \"I Had Ulcers In My Stomach And Colon\"/ per pt on 8/12/2019\"they said recently having some blood in my stomach- in July ( 2019)could not find where coming from \"    Bronchitis Last Episode 2014    Chronic back pain     Chronic pain     [facility]    COPD (chronic obstructive pulmonary disease) [facility])     Sees  Corlis Corns    Depression     Disease of blood and blood forming organ     Fibromyalgia Dx 2013    GERD (gastroesophageal reflux disease)     H/O echocardiogram 08/11/2015    EF >55%. LA to be at the upper limit of normal in size. LV hypertrophy with normal LV systolic, but abnormal diastolic function. Normal valvular structures and function. H/O echocardiogram 08/30/2018    EF 55-60%    Hiatal hernia     History of blood transfusion 09/2015 And 2018    No Reaction To Blood Transfusions Received    History of sleeve gastrectomy     [place] (hard of hearing)     Right Ear    Hx of cardiac catheterization 10/24/2010    Angiographically normal coronary arteries w/ normal LV function and wall motion. Hx of transesophageal echocardiography (PILO) for monitoring 12/02/2010    EF 55-60%. WNL.     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 name    Hypertension     Immune deficiency disorder ([address]     Kidney cysts     \"they found that I have a kidney cyst but not sure which side\" per pt on 7/15/2020    Kidney stone     Lupus ([address] Dx 2013    \"Rheumatoid Lupus\"    MDRO (multiple drug resistant organisms) resistance     4 months ago chest from mediport    Morbid obesity ([address]     MRSA bacteremia     Nausea     \"Most Of The Time\"    Osteomyelitis of fifth toe of left foot (HCC)     Pain management     Sees Dr. Rhona Cheng, Once A Month    Pancreatitis chronic Dx 2001    Pneumonia Dx 11-15    Seizures ([address]     Shortness of breath on exertion     Shortness of breath on exertion     Sleep apnea     Has CPAP\"no longer use the cpap since lost weight\"    Staph infection Dx 11-15    Left Foot    Staph infection Dx 11-15    \"Left Foot\"    Teeth missing     Upper And Lower    Thyroid disease     Wears glasses     To Read       Past Surgical History:   Procedure Laterality Date    ABDOMEN SURGERY      APPENDECTOMY  02/1998    Done When Tubes And Ovaries Were Removed    APPENDECTOMY      CARDIAC CATHETERIZATION  10/24/2010    CATHETER REMOVAL N/A 7/16/2019    PORT REMOVAL performed by Dayron Azevedo MD at [facility]  08/27/1991    CHOLECYSTECTOMY      CHOLECYSTECTOMY, LAPAROSCOPIC  1990's    COLONOSCOPY  Last Done In 2000's    DENTAL SURGERY      Teeth Extracted In Past    ENDOSCOPY, COLON, DIAGNOSTIC  Last Done In 2018    FOOT DEBRIDEMENT Left 6/16/2019    FIRST METATARSAL DEBRIDEMENT INCISION AND DRAINAGE. EXCISION OF ULCER.  RESECTION OF BONE. 1ST METATARSAL POWER LAVAGE AND BONE CEMENT performed by Villa Dill DPM at [address] Left 4/15/2022    LEFT FOOT ABSCESS DEBRIDEMENT INCISION AND DRAINAGE, CYST REMOVAL performed by Villa Dill DPM at [facility] Left Last Done In 2016     Dr. Kim Tee, \" About 6 Surgeries Done Because Of Staph Infection\"    HERNIA REPAIR      HIATAL HERNIA REPAIR N/A 2/12/2019    HERNIA HIATAL LAPAROSCOPIC ROBOTIC performed by Dayron Azevedo MD at [address] ([facility])  10/1997    Partial Abdominal Hysterectomy    HYSTERECTOMY (CERVIX STATUS UNKNOWN)      INSERTION / REMOVAL / REPLACEMENT VENOUS ACCESS CATHETER N/A 8/15/2019    PORT INSERTION performed by Dayron Azevedo MD at [place] [address]. ARTHROSCOPY Left 9/20/2022    LEFT KNEE ARTHROSCOPIC IRRIGATION AND DEBRIDEMENT WITH DRAIN PLACEMENT performed by Krystle Blackman DO at [facility]  ~2000    removed after 6 months    LEG BIOPSY EXCISION Left 3/8/2021    LEFT THIGH LESION BIOPSY EXCISION performed by Dayron Azevedo MD at [address] [place] [address], [facility] Left 2008    Benign    OTHER SURGICAL HISTORY  02/1998    \"Tubes And Ovaries Removed, Appendectomy Also Done\"    OTHER SURGICAL HISTORY  Last Done 7-15-16    Mediport Insertion \"Total Of Six Done, Removed Last Mediport In 2014\"    PORT SURGERY N/A 1/15/2020    PORT REMOVAL performed by Tania Wright MD at [facility] N/A 7/6/2020    PORT INSERTION performed by Tania Wright MD at [facility] Left 8/3/2021    MEDIPORT REPLACEMENT performed by Tania Wright MD at [address] [place] [address] N/A 8/16/2021    GASTRIC BYPASS RUFINO-EN-Y LAPAROSCOPIC ROBOTIC, LYSIS OF ADHESIONS performed by Tania Wright MD at [facility] N/A 2/12/2019    GASTRECTOMY SLEEVE LAPAROSCOPIC ROBOTIC performed by Tania Wright MD at [address] [place] [address]  08/27/2018    UPPER GASTROINTESTINAL ENDOSCOPY N/A 4/2/2019    EGD CONTROL HEMORRHAGE with epi injection at bleeding site performed by Tania Wright MD at [address] [place] [address] 6/19/2019    EGD DIAGNOSTIC ONLY performed by Tania Wright MD at [place] [address] N/A 7/22/2019    EGD DIAGNOSTIC ONLY performed by Hal Chang MD at [place] [address] N/A 10/14/2019    EGD DIAGNOSTIC ONLY performed by Tania Wright MD at [place] [address] N/A 7/17/2020    EGJ DILATATION BALLOON WITH 18-20 MM BALLOON, DILATED TO 20 MM. performed by Tania Wright MD at [place] [address] N/A 5/28/2021    EGD BIOPSY performed by Tania Wright MD at [address] [place] [address] History     Socioeconomic History    Marital status:      Spouse name: Not on file    Number of children: Not on file    Years of education: Not on file    Highest education level: Not on file   Occupational History    Not on file   Tobacco Use    Smoking status: Never    Smokeless tobacco: Never   Vaping Use    Vaping Use: Never used   Substance and Sexual Activity    Alcohol use: Never    Drug use: Never    Sexual activity: Not Currently   Other Topics Concern    Not on file   Social History Narrative    ** Merged History Encounter **          Social Determinants of Health     Financial Resource Strain: Low Risk     Difficulty of Paying Living Expenses: Not hard at all   Food Insecurity: No Food Insecurity    Worried About Running Out of Food in the Last Year: Never true    Ran Out of Food in the Last Year: Never true   Transportation Needs: Not on file   Physical Activity: Not on file   Stress: Not on file   Social Connections: Not on file   Intimate Partner Violence: Not on file   Housing Stability: Not on file       Family History   Problem Relation Age of Onset    Diabetes Mother         \"Borderline Diabetes\"    High Blood Pressure Mother     Obesity Mother     Arthritis Mother     Heart Disease Mother     High Cholesterol Mother     Vision Loss Mother     Diabetes Father     High Blood Pressure Father     Asthma Father     Cancer Father         prostate cancer    High Blood Pressure Brother     Asthma Son     Vision Loss Son     Lupus Daughter     Other Daughter         \"Alot Of Female Problems\"       Vital Signs:  Vitals:    10/19/22 1016   BP: (!) 154/93   Site: Left Lower Arm   Position: Sitting   Cuff Size: Medium Adult   Pulse: 71   Resp: 18   Temp: 97.2 °F (36.2 °C)   TempSrc: Infrared   Weight: 242 lb 12.8 oz (110.1 kg)        Wt Readings from Last 3 Encounters:   10/19/22 242 lb 12.8 oz (110.1 kg)   10/11/22 238 lb (108 kg)   09/28/22 238 lb 1.6 oz (108 kg)        Physical Exam:   Gen: alert and NAD  HEENT: sclera clear, pupils equal and reactive, extra ocular muscles intact, oropharynx clear, mucus membranes moist, tympanic membranes clear bilaterally, no cervical lymphadenopathy noted, and neck supple  Neck: supple, no significant adenopathy  Chest: clear to auscultation, no wheezes, rales or rhonchi, symmetric air entry, left anterior chest wall Mediport   Heart: regular rate and rhythm, no murmurs  ABD: abdomen is soft without significant tenderness, masses, organomegaly or guarding. EXT:peripheral pulses normal, no pedal edema, no clubbing or cyanosis, left knee swelling. NEURO: alert, oriented, normal speech, no focal findings or movement disorder noted  Skin: well hydrated, no lesions, surgical site examined  Wounds: none  Labs:   WBC   Date Value Ref Range Status   10/11/2022 5.8 4.0 - 10.5 K/CU MM Final   10/10/2022 6.1 4.0 - 10.5 K/CU MM Final   10/01/2022 7.0 4.0 - 10.5 K/CU MM Final     Creatinine   Date Value Ref Range Status   10/18/2022 0.8 0.6 - 1.1 MG/DL Final   10/11/2022 0.7 0.6 - 1.1 MG/DL Final   10/10/2022 0.7 0.6 - 1.1 MG/DL Final       Cultures:  Culture   Date Value Ref Range Status   09/30/2022   Final    Final Report Mixed skin jimmy,  Isolated from Broth No further workup No anaerobes isolated   09/27/2022   Preliminary    Final Report No Aerobic or Anaerobic growth after 3 weeks.  No anaerobes isolated   09/22/2022 NO GROWTH AT 5 DAYS  Final       Imaging Studies:   None reviewed      Electronicallysigned by Katelyn Mahoney MD on 10/16/22 at 5:15 PM EDT

## 2022-10-17 LAB
CULTURE: NORMAL
Lab: NORMAL
SPECIMEN: NORMAL

## 2022-10-18 ENCOUNTER — HOSPITAL ENCOUNTER (OUTPATIENT)
Age: 52
Discharge: HOME | End: 2022-10-18
Payer: COMMERCIAL

## 2022-10-18 ENCOUNTER — TELEPHONE (OUTPATIENT)
Dept: INFECTIOUS DISEASES | Age: 52
End: 2022-10-18

## 2022-10-18 DIAGNOSIS — R11.0 NAUSEA: Primary | ICD-10-CM

## 2022-10-18 DIAGNOSIS — M00.862 ARTHRITIS OF LEFT KNEE DUE TO OTHER BACTERIA (HCC): Primary | ICD-10-CM

## 2022-10-18 DIAGNOSIS — M00.862 ARTHRITIS OF LEFT KNEE DUE TO OTHER BACTERIA (HCC): ICD-10-CM

## 2022-10-18 LAB
ALBUMIN SERPL-MCNC: 4.2 GM/DL (ref 3.4–5)
ANION GAP SERPL CALCULATED.3IONS-SCNC: 10 MMOL/L (ref 4–16)
BUN BLDV-MCNC: 13 MG/DL (ref 6–23)
CALCIUM SERPL-MCNC: 10.1 MG/DL (ref 8.3–10.6)
CHLORIDE BLD-SCNC: 103 MMOL/L (ref 99–110)
CO2: 24 MMOL/L (ref 21–32)
CREAT SERPL-MCNC: 0.8 MG/DL (ref 0.6–1.1)
GFR SERPL CREATININE-BSD FRML MDRD: >60 ML/MIN/1.73M2
GLUCOSE BLD-MCNC: 89 MG/DL (ref 70–99)
PHOSPHORUS: 3.2 MG/DL (ref 2.5–4.9)
POTASSIUM SERPL-SCNC: 4.8 MMOL/L (ref 3.5–5.1)
SODIUM BLD-SCNC: 137 MMOL/L (ref 135–145)

## 2022-10-18 PROCEDURE — 80069 RENAL FUNCTION PANEL: CPT

## 2022-10-18 PROCEDURE — 36415 COLL VENOUS BLD VENIPUNCTURE: CPT

## 2022-10-18 RX ORDER — ONDANSETRON 4 MG/1
4 TABLET, ORALLY DISINTEGRATING ORAL 3 TIMES DAILY PRN
Qty: 21 TABLET | Refills: 0 | Status: SHIPPED | OUTPATIENT
Start: 2022-10-18

## 2022-10-18 NOTE — TELEPHONE ENCOUNTER
Pt called in and wants to see about getting something for nausea so she can go out and get her blood drawn. [facility] could not draw from her PICC.

## 2022-10-18 NOTE — TELEPHONE ENCOUNTER
Pt states that she already had zofran and it did not work. Pt is wanting to get phenergan. Please advise.

## 2022-10-18 NOTE — TELEPHONE ENCOUNTER
Pt wants medication sent to [facility] on [address]

## 2022-10-19 ENCOUNTER — OFFICE VISIT (OUTPATIENT)
Dept: INFECTIOUS DISEASES | Age: 52
End: 2022-10-19
Payer: COMMERCIAL

## 2022-10-19 VITALS
RESPIRATION RATE: 18 BRPM | TEMPERATURE: 97.2 F | SYSTOLIC BLOOD PRESSURE: 154 MMHG | BODY MASS INDEX: 41.68 KG/M2 | DIASTOLIC BLOOD PRESSURE: 93 MMHG | HEART RATE: 71 BPM | WEIGHT: 242.8 LBS

## 2022-10-19 DIAGNOSIS — R11.0 NAUSEA: ICD-10-CM

## 2022-10-19 DIAGNOSIS — M00.862 ARTHRITIS OF LEFT KNEE DUE TO OTHER BACTERIA (HCC): ICD-10-CM

## 2022-10-19 DIAGNOSIS — Z79.2 RECEIVING INTRAVENOUS ANTIBIOTIC TREATMENT AS OUTPATIENT: Primary | ICD-10-CM

## 2022-10-19 PROCEDURE — 1111F DSCHRG MED/CURRENT MED MERGE: CPT | Performed by: INTERNAL MEDICINE

## 2022-10-19 PROCEDURE — 3017F COLORECTAL CA SCREEN DOC REV: CPT | Performed by: INTERNAL MEDICINE

## 2022-10-19 PROCEDURE — G8417 CALC BMI ABV UP PARAM F/U: HCPCS | Performed by: INTERNAL MEDICINE

## 2022-10-19 PROCEDURE — 99214 OFFICE O/P EST MOD 30 MIN: CPT | Performed by: INTERNAL MEDICINE

## 2022-10-19 PROCEDURE — G8484 FLU IMMUNIZE NO ADMIN: HCPCS | Performed by: INTERNAL MEDICINE

## 2022-10-19 PROCEDURE — 1036F TOBACCO NON-USER: CPT | Performed by: INTERNAL MEDICINE

## 2022-10-19 PROCEDURE — G8427 DOCREV CUR MEDS BY ELIG CLIN: HCPCS | Performed by: INTERNAL MEDICINE

## 2022-10-19 RX ORDER — PROMETHAZINE HYDROCHLORIDE 25 MG/1
25 TABLET ORAL 4 TIMES DAILY PRN
Qty: 20 TABLET | Refills: 0 | Status: SHIPPED | OUTPATIENT
Start: 2022-10-19 | End: 2022-10-26

## 2022-10-20 NOTE — ASSESSMENT & PLAN NOTE
Presumed septic arthritis as Cutibacterium acne was isolated from broth. Continues to have pain in her knee. Suspect that pain is due to osteoarthritis. Continue ceftriaxone to complete 6-week course.

## 2022-10-25 DIAGNOSIS — G40.909 SEIZURE DISORDER (HCC): ICD-10-CM

## 2022-10-25 DIAGNOSIS — F41.9 ANXIETY: ICD-10-CM

## 2022-10-25 DIAGNOSIS — E03.4 HYPOTHYROIDISM DUE TO ACQUIRED ATROPHY OF THYROID: ICD-10-CM

## 2022-10-25 RX ORDER — SCOLOPAMINE TRANSDERMAL SYSTEM 1 MG/1
1 PATCH, EXTENDED RELEASE TRANSDERMAL
Qty: 10 PATCH | Refills: 0 | Status: SHIPPED | OUTPATIENT
Start: 2022-10-25 | End: 2022-11-24

## 2022-10-25 RX ORDER — HYDROXYZINE PAMOATE 50 MG/1
50 CAPSULE ORAL 2 TIMES DAILY
Qty: 30 CAPSULE | Refills: 2 | Status: SHIPPED | OUTPATIENT
Start: 2022-10-25 | End: 2022-11-09

## 2022-10-25 RX ORDER — LEVOTHYROXINE SODIUM 0.12 MG/1
125 TABLET ORAL DAILY
Qty: 30 TABLET | Refills: 2 | Status: ON HOLD | OUTPATIENT
Start: 2022-10-25

## 2022-10-25 RX ORDER — LEVETIRACETAM 1000 MG/1
TABLET ORAL
Qty: 60 TABLET | Refills: 3 | Status: ON HOLD | OUTPATIENT
Start: 2022-10-25

## 2022-10-28 ENCOUNTER — TELEPHONE (OUTPATIENT)
Dept: INFECTIOUS DISEASES | Age: 52
End: 2022-10-28

## 2022-10-28 PROBLEM — M25.062 HEMARTHROSIS OF LEFT KNEE: Status: ACTIVE | Noted: 2022-10-28

## 2022-10-28 NOTE — TELEPHONE ENCOUNTER
Pt called and wants to know if she can get a script for phenergan, due to her nashua. Please advise.

## 2022-10-31 ENCOUNTER — HOSPITAL ENCOUNTER (OUTPATIENT)
Age: 52
Discharge: HOME | End: 2022-10-31
Payer: COMMERCIAL

## 2022-10-31 DIAGNOSIS — Z79.2 RECEIVING INTRAVENOUS ANTIBIOTIC TREATMENT AS OUTPATIENT: ICD-10-CM

## 2022-10-31 LAB
ALBUMIN SERPL-MCNC: 4.6 GM/DL (ref 3.4–5)
ALP BLD-CCNC: 133 IU/L (ref 40–129)
ALT SERPL-CCNC: 12 U/L (ref 10–40)
ANION GAP SERPL CALCULATED.3IONS-SCNC: 10 MMOL/L (ref 4–16)
AST SERPL-CCNC: 16 IU/L (ref 15–37)
BILIRUB SERPL-MCNC: 0.2 MG/DL (ref 0–1)
BILIRUBIN DIRECT: 0.2 MG/DL (ref 0–0.3)
BILIRUBIN, INDIRECT: 0 MG/DL (ref 0–0.7)
BUN BLDV-MCNC: 7 MG/DL (ref 6–23)
CALCIUM SERPL-MCNC: 10.3 MG/DL (ref 8.3–10.6)
CHLORIDE BLD-SCNC: 107 MMOL/L (ref 99–110)
CO2: 25 MMOL/L (ref 21–32)
CREAT SERPL-MCNC: 0.6 MG/DL (ref 0.6–1.1)
ERYTHROCYTE SEDIMENTATION RATE: 46 MM/HR (ref 0–30)
GFR SERPL CREATININE-BSD FRML MDRD: >60 ML/MIN/1.73M2
GLUCOSE BLD-MCNC: 92 MG/DL (ref 70–99)
HCT VFR BLD CALC: 34 % (ref 37–47)
HEMOGLOBIN: 10.5 GM/DL (ref 12.5–16)
HIGH SENSITIVE C-REACTIVE PROTEIN: 4.6 MG/L (ref 0–5)
MCH RBC QN AUTO: 26.3 PG (ref 27–31)
MCHC RBC AUTO-ENTMCNC: 30.9 % (ref 32–36)
MCV RBC AUTO: 85.2 FL (ref 78–100)
PDW BLD-RTO: 13.3 % (ref 11.7–14.9)
PLATELET # BLD: 282 K/CU MM (ref 140–440)
PMV BLD AUTO: 10.6 FL (ref 7.5–11.1)
POTASSIUM SERPL-SCNC: 4.2 MMOL/L (ref 3.5–5.1)
RBC # BLD: 3.99 M/CU MM (ref 4.2–5.4)
SODIUM BLD-SCNC: 142 MMOL/L (ref 135–145)
TOTAL PROTEIN: 6.9 GM/DL (ref 6.4–8.2)
WBC # BLD: 3.9 K/CU MM (ref 4–10.5)

## 2022-10-31 PROCEDURE — 80053 COMPREHEN METABOLIC PANEL: CPT

## 2022-10-31 PROCEDURE — 36415 COLL VENOUS BLD VENIPUNCTURE: CPT

## 2022-10-31 PROCEDURE — 85027 COMPLETE CBC AUTOMATED: CPT

## 2022-10-31 PROCEDURE — 85652 RBC SED RATE AUTOMATED: CPT

## 2022-10-31 PROCEDURE — 82248 BILIRUBIN DIRECT: CPT

## 2022-10-31 PROCEDURE — 86141 C-REACTIVE PROTEIN HS: CPT

## 2022-11-02 ENCOUNTER — OFFICE VISIT (OUTPATIENT)
Dept: INFECTIOUS DISEASES | Age: 52
End: 2022-11-02
Payer: COMMERCIAL

## 2022-11-02 ENCOUNTER — OFFICE VISIT (OUTPATIENT)
Dept: ORTHOPEDIC SURGERY | Age: 52
End: 2022-11-02

## 2022-11-02 ENCOUNTER — HOSPITAL ENCOUNTER (OUTPATIENT)
Dept: ULTRASOUND IMAGING | Age: 52
Discharge: HOME | End: 2022-11-02
Payer: COMMERCIAL

## 2022-11-02 VITALS
OXYGEN SATURATION: 98 % | RESPIRATION RATE: 16 BRPM | SYSTOLIC BLOOD PRESSURE: 140 MMHG | HEIGHT: 64 IN | WEIGHT: 239 LBS | DIASTOLIC BLOOD PRESSURE: 96 MMHG | BODY MASS INDEX: 40.8 KG/M2 | HEART RATE: 84 BPM

## 2022-11-02 VITALS
DIASTOLIC BLOOD PRESSURE: 77 MMHG | RESPIRATION RATE: 18 BRPM | TEMPERATURE: 97 F | SYSTOLIC BLOOD PRESSURE: 151 MMHG | HEART RATE: 73 BPM | WEIGHT: 239.4 LBS | BODY MASS INDEX: 41.09 KG/M2

## 2022-11-02 DIAGNOSIS — M79.89 PAIN AND SWELLING OF LEFT LOWER LEG: ICD-10-CM

## 2022-11-02 DIAGNOSIS — R11.0 NAUSEA: ICD-10-CM

## 2022-11-02 DIAGNOSIS — M79.662 PAIN AND SWELLING OF LEFT LOWER LEG: ICD-10-CM

## 2022-11-02 DIAGNOSIS — Z79.2 RECEIVING INTRAVENOUS ANTIBIOTIC TREATMENT AS OUTPATIENT: ICD-10-CM

## 2022-11-02 DIAGNOSIS — Z98.890 S/P ARTHROSCOPIC SURGERY OF LEFT KNEE: ICD-10-CM

## 2022-11-02 DIAGNOSIS — M79.662 PAIN AND SWELLING OF LEFT LOWER LEG: Primary | ICD-10-CM

## 2022-11-02 DIAGNOSIS — M00.862 ARTHRITIS OF LEFT KNEE DUE TO OTHER BACTERIA (HCC): Primary | ICD-10-CM

## 2022-11-02 DIAGNOSIS — R19.7 DIARRHEA, UNSPECIFIED TYPE: ICD-10-CM

## 2022-11-02 DIAGNOSIS — M79.89 PAIN AND SWELLING OF LEFT LOWER LEG: Primary | ICD-10-CM

## 2022-11-02 PROCEDURE — G8427 DOCREV CUR MEDS BY ELIG CLIN: HCPCS | Performed by: INTERNAL MEDICINE

## 2022-11-02 PROCEDURE — G8484 FLU IMMUNIZE NO ADMIN: HCPCS | Performed by: INTERNAL MEDICINE

## 2022-11-02 PROCEDURE — 1111F DSCHRG MED/CURRENT MED MERGE: CPT | Performed by: INTERNAL MEDICINE

## 2022-11-02 PROCEDURE — 3078F DIAST BP <80 MM HG: CPT | Performed by: INTERNAL MEDICINE

## 2022-11-02 PROCEDURE — 3017F COLORECTAL CA SCREEN DOC REV: CPT | Performed by: INTERNAL MEDICINE

## 2022-11-02 PROCEDURE — 99024 POSTOP FOLLOW-UP VISIT: CPT | Performed by: STUDENT IN AN ORGANIZED HEALTH CARE EDUCATION/TRAINING PROGRAM

## 2022-11-02 PROCEDURE — 93971 EXTREMITY STUDY: CPT

## 2022-11-02 PROCEDURE — 99215 OFFICE O/P EST HI 40 MIN: CPT | Performed by: INTERNAL MEDICINE

## 2022-11-02 PROCEDURE — G8417 CALC BMI ABV UP PARAM F/U: HCPCS | Performed by: INTERNAL MEDICINE

## 2022-11-02 PROCEDURE — 1036F TOBACCO NON-USER: CPT | Performed by: INTERNAL MEDICINE

## 2022-11-02 PROCEDURE — 3074F SYST BP LT 130 MM HG: CPT | Performed by: INTERNAL MEDICINE

## 2022-11-02 RX ORDER — PROMETHAZINE HYDROCHLORIDE 25 MG/1
25 TABLET ORAL 4 TIMES DAILY PRN
Qty: 20 TABLET | Refills: 0 | Status: SHIPPED | OUTPATIENT
Start: 2022-11-02 | End: 2022-11-09

## 2022-11-02 NOTE — PATIENT INSTRUCTIONS
Continue weight-bearing as tolerated. Continue range of motion exercises as instructed. Ice and elevate as needed. Tylenol or Motrin for pain. Ultrasound Ordered today.    Follow up in 4 weeks

## 2022-11-02 NOTE — PROGRESS NOTES
11/5/2022         Referring Physician: No ref. provider found  Primary Care Physician: Carrie Zhou MD    Impression/Plan:   Diagnosis Orders   1. Arthritis of left knee due to other bacteria ([address]        2. Receiving intravenous antibiotic treatment as outpatient        3. Nausea  promethazine (PHENERGAN) 25 MG tablet      4. Diarrhea, unspecified type  C DIFF TOXIN/ANTIGEN          Discussion:  Septic arthritis of knee, left (HCC)  CRP wnl but ESR is elevated. Return in about 2 weeks (around 11/16/2022). 40 minutes was spent in the encounter; more than 50% of the face-to-face time was spent with the patient providing counseling and coordination of care. History: Farhan Padilla is a 45 y.o.  female presenting today for follow up. She was seen by the ID service during her  16-day stay (9/19/2022 - 10/5/2022) in [facility]. A clinical christopher of possible left knee septic arthritis; she underwent a left knee arthroscopic irrigation and debridement with drain placement on 9/20/2022. Cutibacterium acnes was isolated from broth. She was discharged to complete a 6 week course of ceftriaxone on 11/15/2022. She continues to have left knee pain and intermittent swelling. Worsened when she bears weight on it  She has nausea Zofran ODT not helping her, reports that it gives her a headache. Denies diarrhea. Endorses chronic nausea. 11/2/2022: Left knee pain is 6/10 in intensity, worsened by knee extension. She has nausea, and now has 6 bowel motions daily for 4 days. Diarrhea has resolved. Review of Systems   All other systems reviewed and are negative.     Allergies   Allergen Reactions    Aspirin Palpitations     \"My Heart Rate Elevates\"    Compazine [Prochlorperazine] Rash    Shellfish-Derived Products Swelling    Toradol [Ketorolac Tromethamine] Rash    Haldol [Haloperidol] Other (See Comments)     States \"shook severely and had to have Benadryl\"    Asa [Aspirin]     Compazine [Prochlorperazine] Haldol [Haloperidol]      Shaking      Reglan [Metoclopramide] Itching    Reglan [Metoclopramide]     Toradol [Ketorolac Tromethamine]        Patient Active Problem List   Diagnosis    Fibromyalgia    Lupus (systemic lupus erythematosus) (HCC)    Chronic pancreatitis (HCC)    HTN (hypertension)    Frequent UTI    Gastroesophageal reflux disease without esophagitis    Chronic depression    Fatty liver disease, nonalcoholic    Arthritis    Bilateral low back pain with left-sided sciatica    Hiatal hernia    Chronic pain syndrome    Drug-seeking/Aberrant behavior    Receiving intravenous antibiotic treatment as outpatient    Lymph node disorder    Morbid obesity with BMI of 40.0-44.9, adult (HCC)    Iron deficiency anemia    History of Jet-en-Y gastric bypass    Anxiety    RUQ pain    Discoloration of skin of flank resembling ecchymosis    Chest pain of uncertain etiology    Cellulitis of left thigh    Malabsorption    COPD (chronic obstructive pulmonary disease) (HCC)    Nausea    Hypothyroidism due to acquired atrophy of thyroid    Vitamin D deficiency    Irritable bowel syndrome    Malabsorption due to intolerance, not elsewhere classified    Port-A-Cath in place    Pseudoseizures [facility])    Diarrhea    Myalgia    Acute bilateral deep vein thrombosis (DVT) of femoral veins (HCC)    Acute deep vein thrombosis (DVT) of popliteal vein of left lower extremity (HCC)    Swelling of joint of left knee    Septic arthritis of knee, left (HCC)    Hemarthrosis of left knee       Current Outpatient Medications   Medication Sig Dispense Refill    promethazine (PHENERGAN) 25 MG tablet Take 1 tablet by mouth 4 times daily as needed for Nausea 20 tablet 0    scopolamine (TRANSDERM-SCOP) transdermal patch Place 1 patch onto the skin every 72 hours 10 patch 0    hydrOXYzine pamoate (VISTARIL) 50 MG capsule Take 1 capsule by mouth 2 times daily 30 capsule 2    levETIRAcetam (KEPPRA) 1000 MG tablet Take 1 tablet by mouth twice daily 60 tablet 3    levothyroxine (SYNTHROID) 125 MCG tablet Take 1 tablet by mouth Daily 30 tablet 2    ondansetron (ZOFRAN-ODT) 4 MG disintegrating tablet Take 1 tablet by mouth 3 times daily as needed for Nausea or Vomiting 21 tablet 0    predniSONE (DELTASONE) 10 MG tablet 4 tablets once daily for 2 days then 3 tablets once daily for 2 days then 2 tablets once daily for 2 days then 1 tablet once daily for 2 days. 20 tablet 0    cefTRIAXone (ROCEPHIN) infusion Infuse 1,000 mg intravenously every 24 hours Compound per protocol 42 g 0    pantoprazole (PROTONIX) 40 MG tablet Take 1 tablet by mouth twice daily 180 tablet 0    hydrOXYzine HCl (ATARAX) 50 MG tablet Take 50 mg by mouth 2 times daily      gabapentin (NEURONTIN) 800 MG tablet Take 800 mg by mouth 3 times daily.       vitamin D (CHOLECALCIFEROL) 25 MCG (1000 UT) TABS tablet Take 1,000 Units by mouth daily      estradiol (ESTRACE) 0.5 MG tablet Take 0.5 mg by mouth daily      vitamin B-12 (CYANOCOBALAMIN) 500 MCG tablet Take 500 mcg by mouth daily      Pantoprazole Sodium (PROTONIX PO) Take 40 mg by mouth 2 times daily      PAROXETINE HCL PO Take 60 mg by mouth daily      tiZANidine (ZANAFLEX) 4 MG tablet Take 4 mg by mouth as needed      melatonin 10 MG CAPS capsule Take 10 mg by mouth nightly      estradiol (ESTRACE) 0.5 MG tablet Take 1 tablet by mouth once daily 30 tablet 0    PARoxetine (PAXIL) 30 MG tablet TAKE 1 & 1/2 (ONE & ONE-HALF) TABLETS BY MOUTH NIGHTLY 135 tablet 0    ferrous sulfate (IRON 325) 325 (65 Fe) MG tablet Take 1 tablet by mouth 2 times daily 180 tablet 1    albuterol sulfate HFA (PROVENTIL;VENTOLIN;PROAIR) 108 (90 Base) MCG/ACT inhaler Inhale 2 puffs into the lungs 4 times daily 1 each 3    benzocaine (ORAJEL) 10 % mucosal gel Apply to the ulcer 2-3 times a day 1 each 0    albuterol (PROVENTIL) (2.5 MG/3ML) 0.083% nebulizer solution Take 3 mLs by nebulization every 6 hours as needed for Wheezing 120 each 0    oxyCODONE-acetaminophen (PERCOCET) 5-325 MG per tablet TAKE 1 TABLET BY MOUTH EVERY 6 HOURS AS NEEDED FOR 28 DAYS      NARCAN 4 MG/0.1ML LIQD nasal spray USE AS DIRECTED AS NEEDED FOR SUSPECTED OPIOID OVERDOSE      Melatonin 10 MG TABS Take 10-20 mg by mouth nightly as needed      Cyanocobalamin (VITAMIN B 12 PO) Take 1 tablet by mouth daily      meclizine (ANTIVERT) 25 MG tablet Take 25 mg by mouth 3 times daily as needed for Dizziness      betamethasone valerate (VALISONE) 0.1 % ointment APPLY OINTMENT TO AFFECTED AREA TWICE DAILY TO HANDS AND ELBOWS FOR 21 DAYS      potassium gluconate 550 mg tablet Take 1 tablet by mouth daily      tiZANidine (ZANAFLEX) 4 MG tablet Take 4 mg by mouth every 8 hours as needed       Cholecalciferol (VITAMIN D3) 5000 units TABS Take 1 tablet by mouth daily      Multiple Vitamin (MVI, BARIATRIC ADVANTAGE MULTI-FORMULA, CHEW TAB) Take 1 tablet by mouth daily 30 tablet 5    Calcium Citrate-Vitamin D (CALCIUM + VIT D, BARIATRIC ADVANTAGE, CHEWABLE TABLET) Take 1 tablet by mouth 2 times daily 120 tablet 5    gabapentin (NEURONTIN) 800 MG tablet Take 800 mg by mouth 3 times daily       No current facility-administered medications for this visit. Past Medical History:   Diagnosis Date    Acid reflux     Anemia     Anxiety     Arthritis     Hands, Back And Ankles    Arthritis     Bleeding ulcer 2014    \"I Had Ulcers In My Stomach And Colon\"/ per pt on 8/12/2019\"they said recently having some blood in my stomach- in July ( 2019)could not find where coming from \"    Bronchitis Last Episode 2014    Chronic back pain     Chronic pain     [facility]    COPD (chronic obstructive pulmonary disease) [facility]     Sees Dr. Ssuie Nunez    Depression     Disease of blood and blood forming organ     Fibromyalgia Dx 2013    GERD (gastroesophageal reflux disease)     H/O echocardiogram 08/11/2015    EF >55%. LA to be at the upper limit of normal in size.  LV hypertrophy with normal LV systolic, but abnormal diastolic function. Normal valvular structures and function. H/O echocardiogram 08/30/2018    EF 55-60%    Hiatal hernia     History of blood transfusion 09/2015 And 2018    No Reaction To Blood Transfusions Received    History of sleeve gastrectomy     [place] (hard of hearing)     Right Ear    Hx of cardiac catheterization 10/24/2010    Angiographically normal coronary arteries w/ normal LV function and wall motion. Hx of transesophageal echocardiography (PILO) for monitoring 12/02/2010    EF 55-60%. WNL.     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 name    Hypertension     Immune deficiency disorder ([address]     Kidney cysts     \"they found that I have a kidney cyst but not sure which side\" per pt on 7/15/2020    Kidney stone     Lupus ([address] Dx 2013    \"Rheumatoid Lupus\"    MDRO (multiple drug resistant organisms) resistance     4 months ago chest from mediport    Morbid obesity ([address]     MRSA bacteremia     Nausea     \"Most Of The Time\"    Osteomyelitis of fifth toe of left foot (HCC)     Pain management     Sees Dr. Fuad Bhatia, Once A Month    Pancreatitis chronic Dx 2001    Pneumonia Dx 11-15    Seizures ([address]     Shortness of breath on exertion     Shortness of breath on exertion     Sleep apnea     Has CPAP\"no longer use the cpap since lost weight\"    Staph infection Dx 11-15    Left Foot    Staph infection Dx 11-15    \"Left Foot\"    Teeth missing     Upper And Lower    Thyroid disease     Wears glasses     To Read       Past Surgical History:   Procedure Laterality Date    ABDOMEN SURGERY      APPENDECTOMY  02/1998    Done When Tubes And Ovaries Were Removed    APPENDECTOMY      CARDIAC CATHETERIZATION  10/24/2010 CATHETER REMOVAL N/A 7/16/2019    PORT REMOVAL performed by Luz Driver MD at [facility]  08/27/1991    CHOLECYSTECTOMY      CHOLECYSTECTOMY, LAPAROSCOPIC  1990's    COLONOSCOPY  Last Done In 2000's    DENTAL SURGERY      Teeth Extracted In Past    ENDOSCOPY, COLON, DIAGNOSTIC  Last Done In 2018    FOOT DEBRIDEMENT Left 6/16/2019    FIRST METATARSAL DEBRIDEMENT INCISION AND DRAINAGE. EXCISION OF ULCER.  RESECTION OF BONE. 1ST METATARSAL POWER LAVAGE AND BONE CEMENT performed by Gian Astorga DPM at [facility] [address] Left 4/15/2022    LEFT FOOT ABSCESS DEBRIDEMENT INCISION AND DRAINAGE, CYST REMOVAL performed by Gian Astorga DPM at [facility] Left Last Done In 2016     Dr. Syeda Cantrell, \" About 6 Surgeries Done Because Of Staph Infection\"    HERNIA REPAIR      HIATAL HERNIA REPAIR N/A 2/12/2019    HERNIA HIATAL LAPAROSCOPIC ROBOTIC performed by Luz Driver MD at [place] [address] ([facility])  10/1997    Partial Abdominal Hysterectomy    HYSTERECTOMY (CERVIX STATUS UNKNOWN)      INSERTION / REMOVAL / REPLACEMENT VENOUS ACCESS CATHETER N/A 8/15/2019    PORT INSERTION performed by Luz Driver MD at [place] [address]. ARTHROSCOPY Left 9/20/2022    LEFT KNEE ARTHROSCOPIC IRRIGATION AND DEBRIDEMENT WITH DRAIN PLACEMENT performed by Keith Soni DO at [facility]  ~2000    removed after 6 months    LEG BIOPSY EXCISION Left 3/8/2021    LEFT THIGH LESION BIOPSY EXCISION performed by Luz Driver MD at [address] [place] [address], PARTIAL Left 2008    Benign    OTHER SURGICAL HISTORY  02/1998    \"Tubes And Ovaries Removed, Appendectomy Also Done\"    OTHER SURGICAL HISTORY  Last Done 7-15-16    Mediport Insertion \"Total Of Six Done, Removed Last Mediport In 2014\"    PORT SURGERY N/A 1/15/2020    PORT REMOVAL performed by Luz Driver MD at [address] N/A 7/6/2020    PORT INSERTION performed by Luz Driver MD at [facility] OR    PORT SURGERY Left 8/3/2021    MEDIPORT REPLACEMENT performed by Tiffanie Clark MD at [address] [facility] N/A 8/16/2021    GASTRIC BYPASS RUFINO-EN-Y LAPAROSCOPIC ROBOTIC, LYSIS OF ADHESIONS performed by Tiffanie Clark MD at [facility] N/A 2/12/2019    GASTRECTOMY SLEEVE LAPAROSCOPIC ROBOTIC performed by Tiffanie Clark MD at [address] [place] [address]  08/27/2018    UPPER GASTROINTESTINAL ENDOSCOPY N/A 4/2/2019    EGD CONTROL HEMORRHAGE with epi injection at bleeding site performed by Tiffanie Clark MD at [address] [place] [address] 6/19/2019    EGD DIAGNOSTIC ONLY performed by Tiffanie Clark MD at [facility] N/A 7/22/2019    EGD DIAGNOSTIC ONLY performed by Ethel Andrews MD at [facility] 10/14/2019    EGD DIAGNOSTIC ONLY performed by Tiffanie Clark MD at [facility] N 7/17/2020    EGJ DILATATION BALLOON WITH 18-20 MM BALLOON, DILATED TO 20 MM. performed by Tiffanie Clark MD at [facility] N/A 5/28/2021    EGD BIOPSY performed by Tiffanie Clark MD at [address] [place] [address] History     Socioeconomic History    Marital status:      Spouse name: Not on file    Number of children: Not on file    Years of education: Not on file    Highest education level: Not on file   Occupational History    Not on file   Tobacco Use    Smoking status: Never    Smokeless tobacco: Never   Vaping Use    Vaping Use: Never used   Substance and Sexual Activity    Alcohol use: Never    Drug use: Never    Sexual activity: Not Currently   Other Topics Concern    Not on file   Social History Narrative    ** Merged History Encounter **          Social Determinants of Health Financial Resource Strain: Low Risk     Difficulty of Paying Living Expenses: Not hard at all   Food Insecurity: No Food Insecurity    Worried About Running Out of Food in the Last Year: Never true    Ran Out of Food in the Last Year: Never true   Transportation Needs: Not on file   Physical Activity: Not on file   Stress: Not on file   Social Connections: Not on file   Intimate Partner Violence: Not on file   Housing Stability: Not on file       Family History   Problem Relation Age of Onset    Diabetes Mother         \"Borderline Diabetes\"    High Blood Pressure Mother     Obesity Mother     Arthritis Mother     Heart Disease Mother     High Cholesterol Mother     Vision Loss Mother     Diabetes Father     High Blood Pressure Father     Asthma Father     Cancer Father         prostate cancer    High Blood Pressure Brother     Asthma Son     Vision Loss Son     Lupus Daughter     Other Daughter         \"Alot Of Female Problems\"       Vital Signs:  Vitals:    11/02/22 1111   BP: (!) 151/77   Site: Left Lower Arm   Position: Sitting   Cuff Size: Medium Adult   Pulse: 73   Resp: 18   Temp: 97 °F (36.1 °C)   TempSrc: Infrared   Weight: 239 lb 6.4 oz (108.6 kg)          Wt Readings from Last 3 Encounters:   11/02/22 239 lb (108.4 kg)   11/02/22 239 lb 6.4 oz (108.6 kg)   10/19/22 242 lb 12.8 oz (110.1 kg)        Physical Exam:   Gen: alert and NAD  HEENT: sclera clear, pupils equal and reactive, extra ocular muscles intact, oropharynx clear, mucus membranes moist, tympanic membranes clear bilaterally, no cervical lymphadenopathy noted, and neck supple  Neck: supple, no significant adenopathy  Chest: clear to auscultation, no wheezes, rales or rhonchi, symmetric air entry, left anterior chest wall Mediport   Heart: regular rate and rhythm, no murmurs  ABD: abdomen is soft without significant tenderness, masses, organomegaly or guarding.   EXT:peripheral pulses normal, no pedal edema, no clubbing or cyanosis, left knee swelling. NEURO: alert, oriented, normal speech, no focal findings or movement disorder noted  Skin: well hydrated, no lesions, surgical site examined  Wounds: none  Labs:   WBC   Date Value Ref Range Status   10/31/2022 3.9 (L) 4.0 - 10.5 K/CU MM Final   10/11/2022 5.8 4.0 - 10.5 K/CU MM Final   10/10/2022 6.1 4.0 - 10.5 K/CU MM Final     Creatinine   Date Value Ref Range Status   10/31/2022 0.6 0.6 - 1.1 MG/DL Final   10/18/2022 0.8 0.6 - 1.1 MG/DL Final   10/11/2022 0.7 0.6 - 1.1 MG/DL Final       Cultures:  Culture   Date Value Ref Range Status   09/30/2022   Final    Final Report Mixed skin jimmy,  Isolated from Broth No further workup No anaerobes isolated   09/27/2022   Preliminary    Final Report No Aerobic or Anaerobic growth after 3 weeks.  No anaerobes isolated   09/22/2022 NO GROWTH AT 5 DAYS  Final       Imaging Studies:   None reviewed

## 2022-11-02 NOTE — PROGRESS NOTES
Date of surgery: 9-     Procedure:  1. Diagnostic and operative arthroscopy of Left knee with irrigation and debridement  2. Placement of Left knee drain      History:  Jaylene Linda is here in follow up regarding their 6-week postop appointment for left knee procedure as discussed above    Patient is doing well. They have 2/10 pain. They deny chest pain, SOB, calf pain,fever,wound drainage. No other issues. Patient denies any constitutional symptoms. She states her knee pain is greatly improved although she is noted some pain in the posterior aspect of her knee over the last 48 hours. She is currently not on any DVT prophylaxis as it was not prescribed to her as she left the hospital even though she did have several blood clots. Patient states they have been compliant with restrictions. Patient has been ambulating without assistive device     Physical:   Patient demonstrates appropriate mood and affect. Left lower extremity exam:   The incisions are clean, dry, intact, and nontender with no erythema. There is no drainage. They have no edema, the Arm compartments are soft . There are No cords or calf tenderness. No significant calf/ankle edema. They are neurovascularly intact distally. Range of motion of the right knee demonstrates full painless range of motion without complaint    No areas of tenderness to palpation    Positive Petar's    Imaging:   No new orthopedic and      CRP continues to downtrend and is currently 4.6     Impression: Status post above, doing well        Plan:   -Intraoperative arthroscopic imaging reviewed with patient. Offered reassurance today that there is no concern for any infection and she is doing well.   However, I do have a mild concern for a blood clot due to her history as well as her current physical exam findings we will place an order for her to have a ultrasound lower extremity.  -continue the PT protocol   -Patient is weight-bear as tolerated, range of motion as tolerated.   No restrictions as far soaking the knee as she is healed her incisions  -rest/elevation as needed  -DVT prophylaxis: Defer to PCP or hospitalist but I do feel that she needs to be on something with her history  -No refills needed  -f/u in 4 week(s)  -f/u sooner prn any issues

## 2022-11-03 ENCOUNTER — TELEPHONE (OUTPATIENT)
Dept: ORTHOPEDIC SURGERY | Age: 52
End: 2022-11-03

## 2022-11-03 NOTE — TELEPHONE ENCOUNTER
Patient is calling in asking stating that she has been still having the swelling and increased pain. Patient is aware of the ultrasound and the negative ultrasound results.

## 2022-11-03 NOTE — TELEPHONE ENCOUNTER
Patient called back and was informed.  Patient was advise if she starts to show symptoms that was discusses then she needs to visit her nearest and local [facility]

## 2022-11-03 NOTE — TELEPHONE ENCOUNTER
Please let patient know that there was no concerning findings in regard to the knee yesterday and she need to use ice, elevation and compression to help with any swelling she may have. If she gets fevers,chills, nausea please contact the office.

## 2022-11-07 ENCOUNTER — TELEPHONE (OUTPATIENT)
Dept: INTERNAL MEDICINE CLINIC | Age: 52
End: 2022-11-07

## 2022-11-07 ENCOUNTER — TELEPHONE (OUTPATIENT)
Dept: ORTHOPEDIC SURGERY | Age: 52
End: 2022-11-07

## 2022-11-07 NOTE — TELEPHONE ENCOUNTER
Unless the patient has significant swelling above the kneecap at the suprapatellar pouch causing a large knee effusion with associated issues such as redness and inability to bend the leg as well as constitutional symptoms such as nausea, vomiting, fever, chills I would not be concerned about this. This was typical of her incision prior. If this continues to be an issue she should be seen in the emergency room for lab work to be performed.

## 2022-11-07 NOTE — TELEPHONE ENCOUNTER
Pt called d/t rec'ing a no show for OV letter. Rescheduled pt for this Wednesday at 9:40am. Pt voiced understanding.

## 2022-11-09 ENCOUNTER — OFFICE VISIT (OUTPATIENT)
Dept: INTERNAL MEDICINE CLINIC | Age: 52
End: 2022-11-09
Payer: COMMERCIAL

## 2022-11-09 ENCOUNTER — HOSPITAL ENCOUNTER (OUTPATIENT)
Age: 52
Discharge: HOME | End: 2022-11-09
Payer: COMMERCIAL

## 2022-11-09 VITALS
HEART RATE: 64 BPM | DIASTOLIC BLOOD PRESSURE: 85 MMHG | HEIGHT: 64 IN | SYSTOLIC BLOOD PRESSURE: 125 MMHG | WEIGHT: 238 LBS | OXYGEN SATURATION: 99 % | BODY MASS INDEX: 40.63 KG/M2

## 2022-11-09 DIAGNOSIS — I82.432 ACUTE DEEP VEIN THROMBOSIS (DVT) OF POPLITEAL VEIN OF LEFT LOWER EXTREMITY (HCC): ICD-10-CM

## 2022-11-09 DIAGNOSIS — N95.1 MENOPAUSAL VASOMOTOR SYNDROME: ICD-10-CM

## 2022-11-09 DIAGNOSIS — M32.9 SYSTEMIC LUPUS ERYTHEMATOSUS, UNSPECIFIED SLE TYPE, UNSPECIFIED ORGAN INVOLVEMENT STATUS (HCC): Chronic | ICD-10-CM

## 2022-11-09 DIAGNOSIS — J44.9 CHRONIC OBSTRUCTIVE PULMONARY DISEASE, UNSPECIFIED COPD TYPE (HCC): ICD-10-CM

## 2022-11-09 DIAGNOSIS — M00.9 PYOGENIC ARTHRITIS OF LEFT KNEE JOINT, DUE TO UNSPECIFIED ORGANISM (HCC): Primary | ICD-10-CM

## 2022-11-09 DIAGNOSIS — Z98.84 HISTORY OF ROUX-EN-Y GASTRIC BYPASS: ICD-10-CM

## 2022-11-09 DIAGNOSIS — F41.9 ANXIETY: ICD-10-CM

## 2022-11-09 DIAGNOSIS — F32.A CHRONIC DEPRESSION: ICD-10-CM

## 2022-11-09 DIAGNOSIS — R56.9 PSEUDOSEIZURES (HCC): ICD-10-CM

## 2022-11-09 DIAGNOSIS — D50.0 IRON DEFICIENCY ANEMIA DUE TO CHRONIC BLOOD LOSS: ICD-10-CM

## 2022-11-09 DIAGNOSIS — G89.4 CHRONIC PAIN SYNDROME: ICD-10-CM

## 2022-11-09 DIAGNOSIS — M25.062 HEMARTHROSIS OF LEFT KNEE: ICD-10-CM

## 2022-11-09 DIAGNOSIS — R11.0 NAUSEA: ICD-10-CM

## 2022-11-09 DIAGNOSIS — E03.4 HYPOTHYROIDISM DUE TO ACQUIRED ATROPHY OF THYROID: ICD-10-CM

## 2022-11-09 DIAGNOSIS — E66.01 MORBID OBESITY WITH BMI OF 40.0-44.9, ADULT (HCC): ICD-10-CM

## 2022-11-09 DIAGNOSIS — K21.9 GASTROESOPHAGEAL REFLUX DISEASE WITHOUT ESOPHAGITIS: ICD-10-CM

## 2022-11-09 DIAGNOSIS — I10 PRIMARY HYPERTENSION: ICD-10-CM

## 2022-11-09 LAB
ALBUMIN SERPL-MCNC: 4.3 GM/DL (ref 3.4–5)
ANION GAP SERPL CALCULATED.3IONS-SCNC: 14 MMOL/L (ref 4–16)
BUN BLDV-MCNC: 17 MG/DL (ref 6–23)
CALCIUM SERPL-MCNC: 10.3 MG/DL (ref 8.3–10.6)
CHLORIDE BLD-SCNC: 103 MMOL/L (ref 99–110)
CO2: 23 MMOL/L (ref 21–32)
CREAT SERPL-MCNC: 0.7 MG/DL (ref 0.6–1.1)
GFR SERPL CREATININE-BSD FRML MDRD: >60 ML/MIN/1.73M2
GLUCOSE BLD-MCNC: 101 MG/DL (ref 70–99)
PHOSPHORUS: 3.9 MG/DL (ref 2.5–4.9)
POTASSIUM SERPL-SCNC: 4.4 MMOL/L (ref 3.5–5.1)
SODIUM BLD-SCNC: 140 MMOL/L (ref 135–145)

## 2022-11-09 PROCEDURE — 36415 COLL VENOUS BLD VENIPUNCTURE: CPT

## 2022-11-09 PROCEDURE — 80069 RENAL FUNCTION PANEL: CPT

## 2022-11-09 PROCEDURE — G8417 CALC BMI ABV UP PARAM F/U: HCPCS | Performed by: INTERNAL MEDICINE

## 2022-11-09 PROCEDURE — 3078F DIAST BP <80 MM HG: CPT | Performed by: INTERNAL MEDICINE

## 2022-11-09 PROCEDURE — G8427 DOCREV CUR MEDS BY ELIG CLIN: HCPCS | Performed by: INTERNAL MEDICINE

## 2022-11-09 PROCEDURE — 99214 OFFICE O/P EST MOD 30 MIN: CPT | Performed by: INTERNAL MEDICINE

## 2022-11-09 PROCEDURE — 1036F TOBACCO NON-USER: CPT | Performed by: INTERNAL MEDICINE

## 2022-11-09 PROCEDURE — 3017F COLORECTAL CA SCREEN DOC REV: CPT | Performed by: INTERNAL MEDICINE

## 2022-11-09 PROCEDURE — 3074F SYST BP LT 130 MM HG: CPT | Performed by: INTERNAL MEDICINE

## 2022-11-09 PROCEDURE — G8484 FLU IMMUNIZE NO ADMIN: HCPCS | Performed by: INTERNAL MEDICINE

## 2022-11-09 PROCEDURE — 3023F SPIROM DOC REV: CPT | Performed by: INTERNAL MEDICINE

## 2022-11-09 RX ORDER — ESTRADIOL 0.5 MG/1
0.5 TABLET ORAL DAILY
Qty: 30 TABLET | Refills: 1 | Status: ON HOLD | OUTPATIENT
Start: 2022-11-09

## 2022-11-09 RX ORDER — ONDANSETRON 4 MG/1
4 TABLET, ORALLY DISINTEGRATING ORAL 3 TIMES DAILY PRN
Qty: 30 TABLET | Refills: 1 | Status: ON HOLD | OUTPATIENT
Start: 2022-11-09

## 2022-11-09 NOTE — PROGRESS NOTES
Name: Kaye Hall  [id number]  Age: 45 y.o. YOB: 1970  Sex: female    CHIEF COMPLAINT:    Chief Complaint   Patient presents with    Hypertension    Leg Pain     Left leg pain since surgery by Dr Abigail Mcmillan    Other     Other chronic conditions         HISTORY OF PRESENT ILLNESS:     This is a pleasant  45 y.o. female  is seen today for management of chronic medical problems and medications refills. Previous records reviewed . Patient with multiple complaints and multiple medical problems including   arthritis, depression, hypertension, kidney stone, hx of pancreatitis, seizures, thyroid disease, multiple left foot infections, Jet-en-Y gastric bypass, lupus, hypothyroidism,, chronic pancreatitis, anemia, fibromyalgia. She was admitted to the hospital from September 19, 2022-October 5, 2022. She was admitted because of severe intractable nausea and vomiting for more than a month, decreased appetite, lightheadedness, epigastric abdominal pain, and leg pain. She reports she felt pop in back of her knee/calf area several days ago. 9/19/2022. Demi Lamprey of the left knee  No acute osseous abnormality of the left knee. 9/19/22 Venous doppler  Impression   1. Nonocclusive thrombus in the left distal femoral and popliteal veins. 2. Anechoic suprapatellar fluid collection may represent joint effusion or   fluid within the prepatellar bursa. Findings were discussed with CHARLIE South at 4:34 pm on 9/19/2022.     9/19/22 CT of abdomen and pelvis with IV contrast  Impression   1. Status post Jet-en-Y gastric bypass. Gastric pouch is mildly distended   with ingested contents but is otherwise nonspecific. This appears slightly   more distended when compared to recent CTs and could be related to recent   meal. The esophagus and Jet limb is normal in caliber. 2. Stable pneumobilia. 3. No other acute abdominal or pelvic abnormality. 4. Punctate nonobstructing left renal calculus. She was seen by Dr. Collins Arenas and had arthroscopic irrigation and debridement with drain placed on 9/20/2022. She had left knee hemarthrosis as such her Lovenox dose was decreased and later discontinued. Venous Doppler study was repeated on 10/3/2022 and it came back as no DVT. As such at the time of discharge she was not restarted on any blood thinner. ID was consulted who recommended IV antibiotics. Initially she was on vancomycin until the day of discharge and then she was discharged on ceftriaxone. She is a still following with ID doctor and getting ceftriaxone. She will be taking ceftriaxone until 11/15/2022 and she has an appointment with her ID doctor to follow. C/O fatigue , chronic. C/O aches and pains. C/O seizures but less often now. Sees neurologist.. pseudo seizures. Doing Ok otherwise. Occasional chest pain. She denies any fever, sore throat, cough or chest congestion. She had gastric bypass surgery on 8/16/2021 when she was @ 340 lbs. She has lost @ 100 lbs since then. No further weight loss since I saw her last.     She has generalized aches and pains mostly in her lower back and legs for which she is seeing pain management doctor and taking Percocet from them. Denies any abdominal pain. She has chronic nausea and takes Phenergan or Zofran frequently. She has history recurrent pancreatitis but none since several months. Bowels are moving okay. No urinary symptoms. Hearing is good. Vision okay with glasses. Denies any significant skin lesions. She is going to see her neurologist soon for recurrent seizures. She carries a diagnosis of pseudoseizures. Continues to have depression and anxiety. Medications partially helps her. She had an appointment with psychiatrist but the clinic at [facility] closed and she has no appointment now. Advised patient to go to walk-in clinic at [facility].      She has been fully vaccinated for COVID-19 including the booster dose.       Past Medical History:    Patient Active Problem List   Diagnosis    Fibromyalgia    Lupus (systemic lupus erythematosus) (HCC)    Chronic pancreatitis (HCC)    HTN (hypertension)    Frequent UTI    Gastroesophageal reflux disease without esophagitis    Chronic depression    Fatty liver disease, nonalcoholic    Arthritis    Bilateral low back pain with left-sided sciatica    Hiatal hernia    Chronic pain syndrome    Drug-seeking/Aberrant behavior    Receiving intravenous antibiotic treatment as outpatient    Lymph node disorder    Morbid obesity with BMI of 40.0-44.9, adult ([address]    Iron deficiency anemia    History of Jet-en-Y gastric bypass    Anxiety    RUQ pain    Discoloration of skin of flank resembling ecchymosis    Chest pain of uncertain etiology    Cellulitis of left thigh    Malabsorption    COPD (chronic obstructive pulmonary disease) (HCC)    Nausea    Hypothyroidism due to acquired atrophy of thyroid    Vitamin D deficiency    Irritable bowel syndrome    Malabsorption due to intolerance, not elsewhere classified    Port-A-Cath in place    Pseudoizures [facility])    Diarrhea    Myalgia    Acute bilateral deep vein thrombosis (DVT) of femoral veins (HCC)    Acute deep vein thrombosis (DVT) of popliteal vein of left lower extremity (HCC)    Swelling of joint of left knee    Septic arthritis of knee, left (HCC)    Hemarthrosis of left knee        Past Surgical History:        Procedure Laterality Date    ABDOMEN SURGERY      APPENDECTOMY  02/1998    Done When Tubes And Ovaries Were Removed    APPENDECTOMY      CARDIAC CATHETERIZATION  10/24/2010    CATHETER REMOVAL N/A 7/16/2019    PORT REMOVAL performed by Mitch Chun MD at [address] [place] [address]  08/27/1991    CHOLECYSTECTOMY      CHOLECYSTECTOMY, LAPAROSCOPIC  1990's    COLONOSCOPY  Last Done In 2000's    DENTAL SURGERY      Teeth Extracted In Past    ENDOSCOPY, COLON, DIAGNOSTIC  Last Done In 2018    FOOT DEBRIDEMENT Left 6/16/2019    FIRST METATARSAL DEBRIDEMENT INCISION AND DRAINAGE. EXCISION OF ULCER.  RESECTION OF BONE. 1ST METATARSAL POWER LAVAGE AND BONE CEMENT performed by Rafa Cruz DPM at [facility] Left 4/15/2022    LEFT FOOT ABSCESS DEBRIDEMENT INCISION AND DRAINAGE, CYST REMOVAL performed by Rafa Cruz DPM at [facility] Left Last Done In 2016     Dr. August Snell, \" About 6 Surgeries Done Because Of Staph Infection\"    HERNIA REPAIR      HIATAL HERNIA REPAIR N/A 2/12/2019    HERNIA HIATAL LAPAROSCOPIC ROBOTIC performed by Odalys Elkins MD at [address] [facility] ([facility])  10/1997    Partial Abdominal Hysterectomy    HYSTERECTOMY (CERVIX STATUS UNKNOWN)      INSERTION / REMOVAL / REPLACEMENT VENOUS ACCESS CATHETER N/A 8/15/2019    PORT INSERTION performed by Odalys Elkins MD at [place] [address]. ARTHROSCOPY Left 9/20/2022    LEFT KNEE ARTHROSCOPIC IRRIGATION AND DEBRIDEMENT WITH DRAIN PLACEMENT performed by Mike Carey DO at [address] [facility]  ~2000    removed after 6 months    LEG BIOPSY EXCISION Left 3/8/2021    LEFT THIGH LESION BIOPSY EXCISION performed by Odalys Elkins MD at [address] [facility], PARTIAL Left 2008    Benign    OTHER SURGICAL HISTORY  02/1998    \"Tubes And Ovaries Removed, Appendectomy Also Done\"    OTHER SURGICAL HISTORY  Last Done 7-15-16    Mediport Insertion \"Total Of Six Done, Removed Last Mediport In 2014\"    PORT SURGERY N/A 1/15/2020    PORT REMOVAL performed by Odalys Elkins MD at [address] [facility] N/A 7/6/2020    PORT INSERTION performed by Odalys Elkins MD at [facility] Left 8/3/2021    MEDIPORT REPLACEMENT performed by Odalys Elkins MD at [address] [facility] N/A 8/16/2021    GASTRIC BYPASS RUFINO-EN-Y LAPAROSCOPIC ROBOTIC, LYSIS OF ADHESIONS performed by Odalys Elkins MD at [place] [address] 2/12/2019    [address] [place], [place] [address] ROBOTIC tromethamine]    Current Medications :      Prior to Admission medications    Medication Sig Start Date End Date Taking? Authorizing Provider   estradiol (ESTRACE) 0.5 MG tablet Take 1 tablet by mouth daily 11/9/22  Yes Fatoumata Mayorga MD   ondansetron (ZOFRAN-ODT) 4 MG disintegrating tablet Take 1 tablet by mouth 3 times daily as needed for Nausea or Vomiting 11/9/22  Yes Fatoumata Mayorga MD   promethazine (PHENERGAN) 25 MG tablet Take 1 tablet by mouth 4 times daily as needed for Nausea 11/2/22 11/9/22 Yes Dana Valdivia MD   scopolamine (TRANSDERM-SCOP) transdermal patch Place 1 patch onto the skin every 72 hours 10/25/22 11/24/22 Yes Fatoumata Mayorga MD   levETIRAcetam (KEPPRA) 1000 MG tablet Take 1 tablet by mouth twice daily 10/25/22  Yes Fatoumata Mayorga MD   levothyroxine (SYNTHROID) 125 MCG tablet Take 1 tablet by mouth Daily 10/25/22  Yes Fatoumata Mayorga MD   predniSONE (DELTASONE) 10 MG tablet 4 tablets once daily for 2 days then 3 tablets once daily for 2 days then 2 tablets once daily for 2 days then 1 tablet once daily for 2 days.  10/5/22  Yes Aimee Hamilton MD   cefTRIAXone (ROCEPHIN) infusion Infuse 1,000 mg intravenously every 24 hours Compound per protocol 10/6/22 11/17/22 Yes Aimee Hamilton MD   pantoprazole (PROTONIX) 40 MG tablet Take 1 tablet by mouth twice daily 9/20/22  Yes Fatoumata Mayorga MD   hydrOXYzine HCl (ATARAX) 50 MG tablet Take 50 mg by mouth 2 times daily   Yes Historical Provider, MD   PARoxetine (PAXIL) 30 MG tablet TAKE 1 & 1/2 (ONE & ONE-HALF) TABLETS BY MOUTH NIGHTLY 9/12/22  Yes Fatoumata Mayorga MD   ferrous sulfate (IRON 325) 325 (65 Fe) MG tablet Take 1 tablet by mouth 2 times daily 6/25/22  Yes Fatoumata Mayorga MD   albuterol sulfate HFA (PROVENTIL;VENTOLIN;PROAIR) 108 (90 Base) MCG/ACT inhaler Inhale 2 puffs into the lungs 4 times daily 6/24/22  Yes Fatoumata Mayorga MD   albuterol (PROVENTIL) (2.5 MG/3ML) 0.083% nebulizer solution Take 3 mLs by nebulization every 6 hours as needed for Wheezing 9/9/21 Yes Ros Warner MD   oxyCODONE-acetaminophen (PERCOCET) 5-325 MG per tablet TAKE 1 TABLET BY MOUTH EVERY 6 HOURS AS NEEDED FOR 28 DAYS 8/28/21  Yes Historical Provider, MD   NARCAN 4 MG/0.1ML LIQD nasal spray USE AS DIRECTED AS NEEDED FOR SUSPECTED OPIOID OVERDOSE 8/20/21  Yes Historical Provider, MD   Melatonin 10 MG TABS Take 10-20 mg by mouth nightly as needed   Yes Historical Provider, MD   Cyanocobalamin (VITAMIN B 12 PO) Take 1 tablet by mouth daily   Yes Historical Provider, MD   meclizine (ANTIVERT) 25 MG tablet Take 25 mg by mouth 3 times daily as needed for Dizziness   Yes Historical Provider, MD   betamethasone valerate (VALISONE) 0.1 % ointment APPLY OINTMENT TO AFFECTED AREA TWICE DAILY TO HANDS AND ELBOWS FOR 21 DAYS 3/26/21  Yes Historical Provider, MD   potassium gluconate 550 mg tablet Take 1 tablet by mouth daily 3/25/21  Yes Historical Provider, MD   tiZANidine (ZANAFLEX) 4 MG tablet Take 4 mg by mouth every 8 hours as needed  3/3/21  Yes Historical Provider, MD   Cholecalciferol (VITAMIN D3) 5000 units TABS Take 1 tablet by mouth daily   Yes Historical Provider, MD   Multiple Vitamin (MVI, BARIATRIC ADVANTAGE MULTI-FORMULA, CHEW TAB) Take 1 tablet by mouth daily 2/22/19  Yes Reji Chowdhury MD   Calcium Citrate-Vitamin D (CALCIUM + VIT D, BARIATRIC ADVANTAGE, CHEWABLE TABLET) Take 1 tablet by mouth 2 times daily 2/22/19  Yes Reji Chowdhury MD   gabapentin (NEURONTIN) 800 MG tablet Take 800 mg by mouth 3 times daily   Yes Historical Provider, MD       LAB DATA: Reviewed. REVIEW OF SYSTEMS:   see HPI/ Comprehensive review of systems negative except for the ones mentioned in HPI.     PHYSICAL EXAMINATION:   /85 (Site: Left Upper Arm, Position: Sitting, Cuff Size: Medium Adult)   Pulse 64   Ht 5' 4\" (1.626 m)   Wt 238 lb (108 kg)   SpO2 99%   BMI 40.85 kg/m²      GENERAL APPEARANCE:      Alert, oriented x 3, well developed, cooperative, not in any distress, appears stated age.  HEAD:                         Normocephalic, atraumatic   EYES:                          PERRLA, EOMI, lids normal, conjuctivea clear, sclera anicteric. NECK:                         Supple, symmetrical,  trachea midline, no thyromegaly, no JVD, no lymphadenopathy. LUNGS:                       Clear to auscultation bilaterally, respirations unlabored, accessory muscles are not used. HEART:                       Regular rate and rhythm, S1 and S2 normal, no murmur, rub or gallop. PMI in MCL. ABDOMEN:                 Soft, non-tender, bowel sounds are normoactive, no masses, no hepatospleenomegaly. Patient is obese. EXTREMITY:              Mild tenderness and swelling of the left knee. NEURO:                      Alert, oriented to person, place and time. Grossly intact. Musculoskeletal:         No kyphosis or scoliosis, mild tenderness in her lower back and also in multiple joints, chronic. Skin:                            Warm and dry. No rash or obvious suspicious lesions. PSYCH:                       Mood euthymic, insight and judgement good. ASSESSMENT/PLAN:    1. Arthritis of left knee due to other bacteria [facility])  Patient on Rocephin antibiotics until 11/15/2022. Advised to keep follow-up appointment with her ID doctor. 2. Hemarthrosis of left knee  Improved. Advised to continue to follow with Dr. Collins Arenas. 3. Acute deep vein thrombosis (DVT) of popliteal vein of left lower extremity (HCC)  Repeat venous Doppler study was negative for DVT and since he does have a history of hemarthrosis will not give her blood thinners at this time    4.  Systemic lupus erythematosus, unspecified SLE type, unspecified organ involvement status [facility])  Patient claimed that she has an appointment with Dr. Daniele Ramírez for her SLE etc.    5. Nausea  Advised to avoid Phenergan and continue Zofran as needed  - ondansetron (ZOFRAN-ODT) 4 MG disintegrating tablet; Take 1 tablet by mouth 3 times daily as needed for Nausea or Vomiting  Dispense: 30 tablet; Refill: 1    6. Chronic obstructive pulmonary disease, unspecified COPD type ([facility]  Continue albuterol HFA as needed    7. Primary hypertension  She is not on any medication for blood pressure at this time. Advised low-salt diet and exercise. 8. Hypothyroidism due to acquired atrophy of thyroid  Continue Synthroid    9. Gastroesophageal reflux disease without esophagitis  Continue Protonix    10. Morbid obesity with BMI of 40.0-44.9, adult ([facility]  11. History of Jet-en-Y gastric bypass  Patient was sent to Dr. Dania Robins for follow-up but apparently she did not keep appointment with him. Advised to make an appointment with him    12. Pseudoseizures ([facility]  Advised to continue to follow with her neurologist.  Olivier Hathaway for now    13. Chronic pain syndrome  Advised to continue to follow with her pain management doctor and take pain medication as prescribed by them. 14. Chronic depression  Continue Paxil    15. Anxiety  Continue Paxil and Vistaril as needed    16. Iron deficiency anemia due to chronic blood loss  Continue ferrous sulfate. Patient received a pint of blood in the hospital.    17. Menopausal vasomotor syndrome  Continue Estrace but advised patient to discontinue it if possible because of history of questionable DVT. Patient wants to continue Estrace and she is going to see her gynecologist soon to discuss about it. - estradiol (ESTRACE) 0.5 MG tablet; Take 1 tablet by mouth daily  Dispense: 30 tablet; Refill: 1    Advised to continue to follow COVID-19 precautions    Care discussed with patient. Questions answered and patient verbalizes understanding and agrees with plan. Medications reviewed and reconciled. Continue current medications. Appropriate prescriptions are ordered. Risks and benefits of meds are discussed. After visit summary provided.     Advised to call for any problems, questions, or concerns. If symptoms worsen or don't improve as expected, to call us or go to ER. Follow up as directed, sooner if needed. Return in about 6 weeks (around 12/21/2022). This dictation was performed with a verbal recognition program and it was checked for errors. It is possible that there are still dictated errors within this office note. Any errors should be brought immediately to my attention for correction. All efforts were made to ensure that this office note is accurate.      Diya Tsai MD MD

## 2022-11-15 ENCOUNTER — TELEPHONE (OUTPATIENT)
Dept: INFECTIOUS DISEASES | Age: 52
End: 2022-11-15

## 2022-11-15 ENCOUNTER — HOSPITAL ENCOUNTER (OUTPATIENT)
Age: 52
Setting detail: SPECIMEN
Discharge: HOME | End: 2022-11-15
Payer: COMMERCIAL

## 2022-11-15 LAB
ALBUMIN SERPL-MCNC: 4 GM/DL (ref 3.4–5)
ALP BLD-CCNC: 115 IU/L (ref 40–128)
ALT SERPL-CCNC: 15 U/L (ref 10–40)
ANION GAP SERPL CALCULATED.3IONS-SCNC: 10 MMOL/L (ref 4–16)
AST SERPL-CCNC: 17 IU/L (ref 15–37)
BASOPHILS ABSOLUTE: 0 K/CU MM
BASOPHILS RELATIVE PERCENT: 1 % (ref 0–1)
BILIRUB SERPL-MCNC: 0.2 MG/DL (ref 0–1)
BUN BLDV-MCNC: 14 MG/DL (ref 6–23)
C-REACTIVE PROTEIN, HIGH SENSITIVITY: 3.3 MG/L
CALCIUM SERPL-MCNC: 9.6 MG/DL (ref 8.3–10.6)
CHLORIDE BLD-SCNC: 106 MMOL/L (ref 99–110)
CO2: 26 MMOL/L (ref 21–32)
CREAT SERPL-MCNC: 0.7 MG/DL (ref 0.6–1.1)
DIFFERENTIAL TYPE: ABNORMAL
EOSINOPHILS ABSOLUTE: 0.3 K/CU MM
EOSINOPHILS RELATIVE PERCENT: 6.5 % (ref 0–3)
ERYTHROCYTE SEDIMENTATION RATE: 14 MM/HR (ref 0–30)
GFR SERPL CREATININE-BSD FRML MDRD: >60 ML/MIN/1.73M2
GLUCOSE BLD-MCNC: 86 MG/DL (ref 70–99)
HCT VFR BLD CALC: 33.4 % (ref 37–47)
HEMOGLOBIN: 10 GM/DL (ref 12.5–16)
IMMATURE NEUTROPHIL %: 0.3 % (ref 0–0.43)
LYMPHOCYTES ABSOLUTE: 1.4 K/CU MM
LYMPHOCYTES RELATIVE PERCENT: 37 % (ref 24–44)
MCH RBC QN AUTO: 25.6 PG (ref 27–31)
MCHC RBC AUTO-ENTMCNC: 29.9 % (ref 32–36)
MCV RBC AUTO: 85.4 FL (ref 78–100)
MONOCYTES ABSOLUTE: 0.3 K/CU MM
MONOCYTES RELATIVE PERCENT: 7 % (ref 0–4)
NUCLEATED RBC %: 0 %
PDW BLD-RTO: 13.2 % (ref 11.7–14.9)
PLATELET # BLD: 201 K/CU MM (ref 140–440)
PMV BLD AUTO: 10.9 FL (ref 7.5–11.1)
POTASSIUM SERPL-SCNC: 4.5 MMOL/L (ref 3.5–5.1)
RBC # BLD: 3.91 M/CU MM (ref 4.2–5.4)
SEGMENTED NEUTROPHILS ABSOLUTE COUNT: 1.9 K/CU MM
SEGMENTED NEUTROPHILS RELATIVE PERCENT: 48.2 % (ref 36–66)
SODIUM BLD-SCNC: 142 MMOL/L (ref 135–145)
TOTAL IMMATURE NEUTOROPHIL: 0.01 K/CU MM
TOTAL NUCLEATED RBC: 0 K/CU MM
TOTAL PROTEIN: 6 GM/DL (ref 6.4–8.2)
WBC # BLD: 3.8 K/CU MM (ref 4–10.5)

## 2022-11-15 PROCEDURE — 85025 COMPLETE CBC W/AUTO DIFF WBC: CPT

## 2022-11-15 PROCEDURE — 86140 C-REACTIVE PROTEIN: CPT

## 2022-11-15 PROCEDURE — 85652 RBC SED RATE AUTOMATED: CPT

## 2022-11-15 PROCEDURE — 80053 COMPREHEN METABOLIC PANEL: CPT

## 2022-11-15 NOTE — TELEPHONE ENCOUNTER
Pt called and wants to see about getting some phenergan to help her while taking her antibiotic, which makes her josh.

## 2022-11-17 ENCOUNTER — OFFICE VISIT (OUTPATIENT)
Dept: INFECTIOUS DISEASES | Age: 52
End: 2022-11-17
Payer: COMMERCIAL

## 2022-11-17 VITALS
TEMPERATURE: 97.2 F | RESPIRATION RATE: 18 BRPM | SYSTOLIC BLOOD PRESSURE: 143 MMHG | HEART RATE: 76 BPM | BODY MASS INDEX: 41.09 KG/M2 | WEIGHT: 239.4 LBS | DIASTOLIC BLOOD PRESSURE: 89 MMHG

## 2022-11-17 DIAGNOSIS — M00.862 ARTHRITIS OF LEFT KNEE DUE TO OTHER BACTERIA (HCC): Primary | ICD-10-CM

## 2022-11-17 DIAGNOSIS — R50.9 FEVER, UNSPECIFIED FEVER CAUSE: ICD-10-CM

## 2022-11-17 PROCEDURE — G8417 CALC BMI ABV UP PARAM F/U: HCPCS | Performed by: INTERNAL MEDICINE

## 2022-11-17 PROCEDURE — 3078F DIAST BP <80 MM HG: CPT | Performed by: INTERNAL MEDICINE

## 2022-11-17 PROCEDURE — 99213 OFFICE O/P EST LOW 20 MIN: CPT | Performed by: INTERNAL MEDICINE

## 2022-11-17 PROCEDURE — 3074F SYST BP LT 130 MM HG: CPT | Performed by: INTERNAL MEDICINE

## 2022-11-17 PROCEDURE — G8484 FLU IMMUNIZE NO ADMIN: HCPCS | Performed by: INTERNAL MEDICINE

## 2022-11-17 PROCEDURE — 1036F TOBACCO NON-USER: CPT | Performed by: INTERNAL MEDICINE

## 2022-11-17 PROCEDURE — G8427 DOCREV CUR MEDS BY ELIG CLIN: HCPCS | Performed by: INTERNAL MEDICINE

## 2022-11-17 PROCEDURE — 3017F COLORECTAL CA SCREEN DOC REV: CPT | Performed by: INTERNAL MEDICINE

## 2022-11-17 NOTE — PROGRESS NOTES
11/19/2022         Referring Physician: No ref. provider found  Primary Care Physician: Roosevelt Lr MD    Impression/Plan:   Diagnosis Orders   1. Arthritis of left knee due to other bacteria ([address]        2. Fever, unspecified fever cause  Culture, Blood 3    Culture, Blood 2          Discussion:  Septic arthritis of knee, left (HCC)  CRP is within normal limits, sed rate is reduced and now within normal limits, there is leukopenia and anemia. Possibly secondary to antibiotics. However, his antibiotics is now complete no further action will be taken. Fever  Reports that she had 1 episode of fever a day ago. Unclear if this is a true fever. However, she has been asked to go to the lab to have blood cultures drawn it is fever recurs       Return in 2 weeks (on 12/1/2022), or if symptoms worsen or fail to improve. 30 minutes was spent in the encounter; more than 50% of the face-to-face time was spent with the patient providing counseling and coordination of care. History: Madhavi Ruelas is a 45 y.o.  female presenting today for follow up. She was seen by the ID service during her  16-day stay (9/19/2022 - 10/5/2022) in [facility]. A clinical christopher of possible left knee septic arthritis; she underwent a left knee arthroscopic irrigation and debridement with drain placement on 9/20/2022. Cutibacterium acnes was isolated from broth. She was discharged to complete a 6 week course of ceftriaxone on 11/15/2022. She continues to have left knee pain and intermittent swelling. Worsened when she bears weight on it  She has nausea Zofran ODT not helping her, reports that it gives her a headache. Denies diarrhea. Endorses chronic nausea. 11/2/2022: Left knee pain is 6/10 in intensity, worsened by knee extension. She has nausea, and now has 6 bowel motions daily for 4 days. Diarrhea has resolved. 11/17/2022: Completed ceftriaxone on 11/15/2022. She had fatigue for the last 4 days.  Reports having loose bm X8 some days ago but that has resolved. She endorsed fever 101 F and relieved with Motrin. Reports intermittent painful swelling of her left knee. Review of Systems   All other systems reviewed and are negative.     Allergies   Allergen Reactions    Aspirin Palpitations     \"My Heart Rate Elevates\"    Compazine [Prochlorperazine] Rash    Shellfish-Derived Products Swelling    Toradol [Ketorolac Tromethamine] Rash    Haldol [Haloperidol] Other (See Comments)     States \"shook severely and had to have Benadryl\"    Asa [Aspirin]     Compazine [Prochlorperazine]     Haldol [Haloperidol]      Shaking      Reglan [Metoclopramide] Itching    Reglan [Metoclopramide]     Toradol [Ketorolac Tromethamine]        Patient Active Problem List   Diagnosis    Fever    Fibromyalgia    Lupus (systemic lupus erythematosus) ([facility])    Chronic pancreatitis ([facility])    HTN (hypertension)    Frequent UTI    Gastroesophageal reflux disease without esophagitis    Chronic depression    Fatty liver disease, nonalcoholic    Arthritis    Bilateral low back pain with left-sided sciatica    Hiatal hernia    Chronic pain syndrome    Drug-seeking/Aberrant behavior    Receiving intravenous antibiotic treatment as outpatient    Lymph node disorder    Morbid obesity with BMI of 40.0-44.9, adult ([facility])    Iron deficiency anemia    History of Jet-en-Y gastric bypass    Anxiety    RUQ pain    Discoloration of skin of flank resembling ecchymosis    Chest pain of uncertain etiology    Cellulitis of left thigh    Malabsorption    COPD (chronic obstructive pulmonary disease) ([facility])    Nausea    Hypothyroidism due to acquired atrophy of thyroid    Vitamin D deficiency    Irritable bowel syndrome    Malabsorption due to intolerance, not elsewhere classified    Port-A-Cath in place    Pseudoseizures [facility])    Diarrhea    Myalgia    Acute bilateral deep vein thrombosis (DVT) of femoral veins (HCC)    Acute deep vein thrombosis (DVT) of popliteal vein of left lower extremity (HCC)    Swelling of joint of left knee    Septic arthritis of knee, left (HCC)    Hemarthrosis of left knee       Current Outpatient Medications   Medication Sig Dispense Refill    estradiol (ESTRACE) 0.5 MG tablet Take 1 tablet by mouth daily 30 tablet 1    ondansetron (ZOFRAN-ODT) 4 MG disintegrating tablet Take 1 tablet by mouth 3 times daily as needed for Nausea or Vomiting 30 tablet 1    scopolamine (TRANSDERM-SCOP) transdermal patch Place 1 patch onto the skin every 72 hours 10 patch 0    levETIRAcetam (KEPPRA) 1000 MG tablet Take 1 tablet by mouth twice daily 60 tablet 3    levothyroxine (SYNTHROID) 125 MCG tablet Take 1 tablet by mouth Daily 30 tablet 2    predniSONE (DELTASONE) 10 MG tablet 4 tablets once daily for 2 days then 3 tablets once daily for 2 days then 2 tablets once daily for 2 days then 1 tablet once daily for 2 days.  20 tablet 0    pantoprazole (PROTONIX) 40 MG tablet Take 1 tablet by mouth twice daily 180 tablet 0    hydrOXYzine HCl (ATARAX) 50 MG tablet Take 50 mg by mouth 2 times daily      PARoxetine (PAXIL) 30 MG tablet TAKE 1 & 1/2 (ONE & ONE-HALF) TABLETS BY MOUTH NIGHTLY 135 tablet 0    ferrous sulfate (IRON 325) 325 (65 Fe) MG tablet Take 1 tablet by mouth 2 times daily 180 tablet 1    albuterol sulfate HFA (PROVENTIL;VENTOLIN;PROAIR) 108 (90 Base) MCG/ACT inhaler Inhale 2 puffs into the lungs 4 times daily 1 each 3    albuterol (PROVENTIL) (2.5 MG/3ML) 0.083% nebulizer solution Take 3 mLs by nebulization every 6 hours as needed for Wheezing 120 each 0    oxyCODONE-acetaminophen (PERCOCET) 5-325 MG per tablet TAKE 1 TABLET BY MOUTH EVERY 6 HOURS AS NEEDED FOR 28 DAYS      NARCAN 4 MG/0.1ML LIQD nasal spray USE AS DIRECTED AS NEEDED FOR SUSPECTED OPIOID OVERDOSE      Melatonin 10 MG TABS Take 10-20 mg by mouth nightly as needed      Cyanocobalamin (VITAMIN B 12 PO) Take 1 tablet by mouth daily      meclizine (ANTIVERT) 25 MG tablet Take 25 mg by mouth 3 times daily as needed for Dizziness      betamethasone valerate (VALISONE) 0.1 % ointment APPLY OINTMENT TO AFFECTED AREA TWICE DAILY TO HANDS AND ELBOWS FOR 21 DAYS      potassium gluconate 550 mg tablet Take 1 tablet by mouth daily      tiZANidine (ZANAFLEX) 4 MG tablet Take 4 mg by mouth every 8 hours as needed       Cholecalciferol (VITAMIN D3) 5000 units TABS Take 1 tablet by mouth daily      Multiple Vitamin (MVI, BARIATRIC ADVANTAGE MULTI-FORMULA, CHEW TAB) Take 1 tablet by mouth daily 30 tablet 5    Calcium Citrate-Vitamin D (CALCIUM + VIT D, BARIATRIC ADVANTAGE, CHEWABLE TABLET) Take 1 tablet by mouth 2 times daily 120 tablet 5    gabapentin (NEURONTIN) 800 MG tablet Take 800 mg by mouth 3 times daily       No current facility-administered medications for this visit. Past Medical History:   Diagnosis Date    Acid reflux     Anemia     Anxiety     Arthritis     Hands, Back And Ankles    Arthritis     Bleeding ulcer 2014    \"I Had Ulcers In My Stomach And Colon\"/ per pt on 8/12/2019\"they said recently having some blood in my stomach- in July ( 2019)could not find where coming from \"    Bronchitis Last Episode 2014    Chronic back pain     Chronic pain     [facility]    COPD (chronic obstructive pulmonary disease) ([facility]     Sees Dr. Betsy Rowe    Depression     Disease of blood and blood forming organ     Fibromyalgia Dx 2013    GERD (gastroesophageal reflux disease)     H/O echocardiogram 08/11/2015    EF >55%. LA to be at the upper limit of normal in size. LV hypertrophy with normal LV systolic, but abnormal diastolic function. Normal valvular structures and function.      H/O echocardiogram 08/30/2018    EF 55-60%    Hiatal hernia     History of blood transfusion 09/2015 And 2018    No Reaction To Blood Transfusions Received    History of sleeve gastrectomy     [place] (hard of hearing)     Right Ear    Hx of cardiac catheterization 10/24/2010    Angiographically normal coronary arteries w/ normal LV function and wall motion. Hx of transesophageal echocardiography (PILO) for monitoring 12/02/2010    EF 55-60%. WNL.     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 name    Hypertension     Immune deficiency disorder ([address]     Kidney cysts     \"they found that I have a kidney cyst but not sure which side\" per pt on 7/15/2020    Kidney stone     Lupus ([address] Dx 2013    \"Rheumatoid Lupus\"    MDRO (multiple drug resistant organisms) resistance     4 months ago chest from mediport    Morbid obesity ([address]     MRSA bacteremia     Nausea     \"Most Of The Time\"    Osteomyelitis of fifth toe of left foot (HCC)     Pain management     Sees Dr. Yue Madison, Once A Month    Pancreatitis chronic Dx 2001    Pneumonia Dx 11-15    Seizures ([address]     Shortness of breath on exertion     Shortness of breath on exertion     Sleep apnea     Has CPAP\"no longer use the cpap since lost weight\"    Staph infection Dx 11-15    Left Foot    Staph infection Dx 11-15    \"Left Foot\"    Teeth missing     Upper And Lower    Thyroid disease     Wears glasses     To Read       Past Surgical History:   Procedure Laterality Date    ABDOMEN SURGERY      APPENDECTOMY  02/1998    Done When Tubes And Ovaries Were Removed    APPENDECTOMY      CARDIAC CATHETERIZATION  10/24/2010    CATHETER REMOVAL N/A 7/16/2019    PORT REMOVAL performed by Merlinda Saner, MD at [address] [place] [address]  08/27/1991    CHOLECYSTECTOMY      CHOLECYSTECTOMY, LAPAROSCOPIC  1990's    COLONOSCOPY  Last Done In 2000's    DENTAL SURGERY      Teeth Extracted In Past    ENDOSCOPY, COLON, DIAGNOSTIC  Last Done In 2018    FOOT DEBRIDEMENT Left 6/16/2019    FIRST METATARSAL DEBRIDEMENT INCISION AND DRAINAGE. EXCISION OF ULCER.  RESECTION OF BONE. 1ST METATARSAL POWER LAVAGE AND BONE CEMENT performed by Colleen Castillo DPM at [address] Left 4/15/2022    LEFT FOOT ABSCESS DEBRIDEMENT INCISION AND DRAINAGE, CYST REMOVAL performed by Colleen Castillo DPM at [address] [facility] Left Last Done In 2016     Dr. Lakshmi Greenwood, \" About 6 Surgeries Done Because Of Staph Infection\"    HERNIA REPAIR      HIATAL HERNIA REPAIR N/A 2/12/2019    HERNIA HIATAL LAPAROSCOPIC ROBOTIC performed by Amanda Mercer MD at [address] [place] [address] ([facility])  10/1997    Partial Abdominal Hysterectomy    HYSTERECTOMY (CERVIX STATUS UNKNOWN)      INSERTION / REMOVAL / REPLACEMENT VENOUS ACCESS CATHETER N/A 8/15/2019    PORT INSERTION performed by Amanda Mercer MD at [place] [address]. ARTHROSCOPY Left 9/20/2022    LEFT KNEE ARTHROSCOPIC IRRIGATION AND DEBRIDEMENT WITH DRAIN PLACEMENT performed by Denise Grant DO at [address] [facility]  ~2000    removed after 6 months    LEG BIOPSY EXCISION Left 3/8/2021    LEFT THIGH LESION BIOPSY EXCISION performed by Amanda Mercer MD at [address] [place] [address], PARTIAL Left 2008    Benign    OTHER SURGICAL HISTORY  02/1998    \"Tubes And Ovaries Removed, Appendectomy Also Done\"    OTHER SURGICAL HISTORY  Last Done 7-15-16    Mediport Insertion \"Total Of Six Done, Removed Last Mediport In 2014\"    PORT SURGERY N/A 1/15/2020    PORT REMOVAL performed by Amanda Mercer MD at [address] [place] [address] N/A 7/6/2020    PORT INSERTION performed by Amanda Mercer MD at [address] [place] [address] Left 8/3/2021    MEDIPORT REPLACEMENT performed by Amanda Mercer MD at [address] [facility] N/A 8/16/2021    GASTRIC BYPASS RUFINO-EN-Y LAPAROSCOPIC ROBOTIC, LYSIS OF ADHESIONS performed by Amanda Mercer MD at [facility] N/A 2/12/2019    GASTRECTOMY SLEEVE LAPAROSCOPIC ROBOTIC performed by Amanda Mercer MD at [place] [address] TONSILLECTOMY      TONSILLECTOMY AND ADENOIDECTOMY  1975    UPPER GASTROINTESTINAL ENDOSCOPY  08/27/2018    UPPER GASTROINTESTINAL ENDOSCOPY N/A 4/2/2019    EGD CONTROL HEMORRHAGE with epi injection at bleeding site performed by Josi Tinsley MD at [address] [place] [address],[address] N/A 6/19/2019    EGD DIAGNOSTIC ONLY performed by Josi Tinsley MD at [address] [place] [address],[address] N/A 7/22/2019    EGD DIAGNOSTIC ONLY performed by John Sol MD at [address] [place] [address],[address] N/A 10/14/2019    EGD DIAGNOSTIC ONLY performed by Josi Tinsley MD at [address] [place] [address],[address] N/A 7/17/2020    EGJ DILATATION BALLOON WITH 18-20 MM BALLOON, DILATED TO 20 MM. performed by Josi Tinsley MD at [address] [place] [address],[address] N/A 5/28/2021    EGD BIOPSY performed by Josi Tinsley MD at [address] [place] [address]     Socioeconomic History    Marital status:      Spouse name: Not on file    Number of children: Not on file    Years of education: Not on file    Highest education level: Not on file   Occupational History    Not on file   Tobacco Use    Smoking status: Never    Smokeless tobacco: Never   Vaping Use    Vaping Use: Never used   Substance and Sexual Activity    Alcohol use: Never    Drug use: Never    Sexual activity: Not Currently   Other Topics Concern    Not on file   Social History Narrative    ** Merged History Encounter **          Social Determinants of Health     Financial Resource Strain: Low Risk     Difficulty of Paying Living Expenses: Not hard at all   Food Insecurity: No Food Insecurity    Worried About Running Out of Food in the Last Year: Never true    Ran Out of Food in the Last Year: Never true   Transportation Needs: Not on file   Physical Activity: Not on file   Stress: Not on file   Social Connections: Not on file   Intimate Partner Violence: Not on file   Housing Stability: Not on file       Family History   Problem Relation Age of Onset    Diabetes Mother         \"Borderline Diabetes\"    High Blood Pressure Mother     Obesity Mother     Arthritis Mother     Heart Disease Mother     High Cholesterol Mother     Vision Loss Mother     Diabetes Father     High Blood Pressure Father     Asthma Father     Cancer Father         prostate cancer    High Blood Pressure Brother     Asthma Son     Vision Loss Son     Lupus Daughter     Other Daughter         \"Alot Of Female Problems\"       Vital Signs:  Vitals:    11/17/22 1138   BP: (!) 143/89   Site: Right Lower Arm   Position: Sitting   Cuff Size: Medium Adult   Pulse: 76   Resp: 18   Temp: 97.2 °F (36.2 °C)   TempSrc: Infrared   Weight: 239 lb 6.4 oz (108.6 kg)            Wt Readings from Last 3 Encounters:   11/17/22 239 lb 6.4 oz (108.6 kg)   11/09/22 238 lb (108 kg)   11/02/22 239 lb (108.4 kg)        Physical Exam:   Gen: alert and NAD  HEENT: sclera clear, pupils equal and reactive, extra ocular muscles intact, oropharynx clear, mucus membranes moist, tympanic membranes clear bilaterally, no cervical lymphadenopathy noted, and neck supple  Neck: supple, no significant adenopathy  Chest: clear to auscultation, no wheezes, rales or rhonchi, symmetric air entry, left anterior chest wall Mediport   Heart: regular rate and rhythm, no murmurs  ABD: abdomen is soft without significant tenderness, masses, organomegaly or guarding. EXT:peripheral pulses normal, no pedal edema, no clubbing or cyanosis, left knee swelling.    NEURO: alert, oriented, normal speech, no focal findings or movement disorder noted  Skin: well hydrated, no lesions, surgical site examined  Wounds: none  Labs:   WBC   Date Value Ref Range Status   11/15/2022 3.8 (L) 4.0 - 10.5 K/CU MM Final   10/31/2022 3.9 (L) 4.0 - 10.5 K/CU MM Final   10/11/2022 5.8 4.0 - 10.5 K/CU MM Final     Creatinine   Date Value Ref Range Status 11/15/2022 0.7 0.6 - 1.1 MG/DL Final   11/09/2022 0.7 0.6 - 1.1 MG/DL Final   10/31/2022 0.6 0.6 - 1.1 MG/DL Final       Cultures:  Culture   Date Value Ref Range Status   09/30/2022   Final    Final Report Mixed skin jimmy,  Isolated from Broth No further workup No anaerobes isolated   09/27/2022   Final    Final Report No Aerobic or Anaerobic growth after 3 weeks.  No anaerobes isolated   09/22/2022 NO GROWTH AT 5 DAYS  Final       Imaging Studies:   None reviewed

## 2022-11-17 NOTE — PATIENT INSTRUCTIONS
If you have a body temperature greater than 100.3 F then have your blood drawn for cultures. Mediport can be de-accessed.

## 2022-11-17 NOTE — ASSESSMENT & PLAN NOTE
CRP is within normal limits, sed rate is reduced and now within normal limits, there is leukopenia and anemia. Possibly secondary to antibiotics. However, his antibiotics is now complete no further action will be taken.

## 2022-11-19 NOTE — ASSESSMENT & PLAN NOTE
Reports that she had 1 episode of fever a day ago. Unclear if this is a true fever.   However, she has been asked to go to the lab to have blood cultures drawn it is fever recurs

## 2022-11-26 ENCOUNTER — APPOINTMENT (OUTPATIENT)
Dept: GENERAL RADIOLOGY | Age: 52
End: 2022-11-26
Payer: COMMERCIAL

## 2022-11-26 ENCOUNTER — APPOINTMENT (OUTPATIENT)
Dept: CT IMAGING | Age: 52
End: 2022-11-26
Payer: COMMERCIAL

## 2022-11-26 ENCOUNTER — APPOINTMENT (OUTPATIENT)
Dept: ULTRASOUND IMAGING | Age: 52
End: 2022-11-26
Payer: COMMERCIAL

## 2022-11-26 ENCOUNTER — HOSPITAL ENCOUNTER (INPATIENT)
Age: 52
LOS: 6 days | Discharge: HOME | End: 2022-12-02
Attending: EMERGENCY MEDICINE | Admitting: STUDENT IN AN ORGANIZED HEALTH CARE EDUCATION/TRAINING PROGRAM
Payer: COMMERCIAL

## 2022-11-26 DIAGNOSIS — R10.11 RUQ PAIN: ICD-10-CM

## 2022-11-26 DIAGNOSIS — M25.562 ACUTE PAIN OF LEFT KNEE: ICD-10-CM

## 2022-11-26 DIAGNOSIS — R10.13 EPIGASTRIC PAIN: Primary | ICD-10-CM

## 2022-11-26 DIAGNOSIS — K56.7 ILEUS (HCC): ICD-10-CM

## 2022-11-26 DIAGNOSIS — M25.462 SWELLING OF JOINT OF LEFT KNEE: ICD-10-CM

## 2022-11-26 DIAGNOSIS — M79.10 MYALGIA: ICD-10-CM

## 2022-11-26 LAB
ALBUMIN SERPL-MCNC: 3.9 GM/DL (ref 3.4–5)
ALP BLD-CCNC: 107 IU/L (ref 40–129)
ALT SERPL-CCNC: 11 U/L (ref 10–40)
ANION GAP SERPL CALCULATED.3IONS-SCNC: 9 MMOL/L (ref 4–16)
AST SERPL-CCNC: 13 IU/L (ref 15–37)
BASOPHILS ABSOLUTE: 0 K/CU MM
BASOPHILS RELATIVE PERCENT: 0.7 % (ref 0–1)
BILIRUB SERPL-MCNC: 0.2 MG/DL (ref 0–1)
BILIRUBIN URINE: NEGATIVE MG/DL
BLOOD, URINE: NEGATIVE
BUN BLDV-MCNC: 18 MG/DL (ref 6–23)
C-REACTIVE PROTEIN, HIGH SENSITIVITY: 3.9 MG/L
CALCIUM SERPL-MCNC: 10 MG/DL (ref 8.3–10.6)
CHLORIDE BLD-SCNC: 104 MMOL/L (ref 99–110)
CLARITY: CLEAR
CO2: 24 MMOL/L (ref 21–32)
COLOR: YELLOW
CREAT SERPL-MCNC: 0.7 MG/DL (ref 0.6–1.1)
DIFFERENTIAL TYPE: ABNORMAL
EOSINOPHILS ABSOLUTE: 0.3 K/CU MM
EOSINOPHILS RELATIVE PERCENT: 7.1 % (ref 0–3)
ERYTHROCYTE SEDIMENTATION RATE: 14 MM/HR (ref 0–30)
FLUID TYPE: NORMAL INDEX
GFR SERPL CREATININE-BSD FRML MDRD: >60 ML/MIN/1.73M2
GLUCOSE BLD-MCNC: 102 MG/DL (ref 70–99)
GLUCOSE, FLUID: 83 MG/DL
GLUCOSE, URINE: NEGATIVE MG/DL
HCT VFR BLD CALC: 31.5 % (ref 37–47)
HEMOGLOBIN: 9.7 GM/DL (ref 12.5–16)
IMMATURE NEUTROPHIL %: 0.2 % (ref 0–0.43)
KETONES, URINE: NEGATIVE MG/DL
LEUKOCYTE ESTERASE, URINE: NEGATIVE
LIPASE: 16 IU/L (ref 13–60)
LYMPHOCYTES ABSOLUTE: 1.4 K/CU MM
LYMPHOCYTES RELATIVE PERCENT: 31.8 % (ref 24–44)
LYMPHOCYTES, BODY FLUID: 34 %
MCH RBC QN AUTO: 25.3 PG (ref 27–31)
MCHC RBC AUTO-ENTMCNC: 30.8 % (ref 32–36)
MCV RBC AUTO: 82 FL (ref 78–100)
MESOTHELIAL FLUID: 0 /100 WBC
MONOCYTE, FLUID: 12 %
MONOCYTES ABSOLUTE: 0.4 K/CU MM
MONOCYTES RELATIVE PERCENT: 8.4 % (ref 0–4)
NEUTROPHIL, FLUID: 52 %
NITRITE URINE, QUANTITATIVE: NEGATIVE
NUCLEATED RBC %: 0 %
OTHER CELLS FLUID: NORMAL
PDW BLD-RTO: 13.2 % (ref 11.7–14.9)
PH, URINE: 6 (ref 5–8)
PLATELET # BLD: 193 K/CU MM (ref 140–440)
PMV BLD AUTO: 10.8 FL (ref 7.5–11.1)
POTASSIUM SERPL-SCNC: 4.1 MMOL/L (ref 3.5–5.1)
PROTEIN FLUID: 3.8 GM/DL
PROTEIN UA: NEGATIVE MG/DL
RBC # BLD: 3.84 M/CU MM (ref 4.2–5.4)
RBC FLUID: NORMAL /CU MM
SEGMENTED NEUTROPHILS ABSOLUTE COUNT: 2.4 K/CU MM
SEGMENTED NEUTROPHILS RELATIVE PERCENT: 51.8 % (ref 36–66)
SODIUM BLD-SCNC: 137 MMOL/L (ref 135–145)
SPECIFIC GRAVITY UA: 1.02 (ref 1–1.03)
TOTAL IMMATURE NEUTOROPHIL: 0.01 K/CU MM
TOTAL NUCLEATED RBC: 0 K/CU MM
TOTAL PROTEIN: 6.5 GM/DL (ref 6.4–8.2)
UROBILINOGEN, URINE: 0.2 MG/DL (ref 0.2–1)
WBC # BLD: 4.5 K/CU MM (ref 4–10.5)
WBC FLUID: 630 /CU MM

## 2022-11-26 PROCEDURE — 86140 C-REACTIVE PROTEIN: CPT

## 2022-11-26 PROCEDURE — 96372 THER/PROPH/DIAG INJ SC/IM: CPT

## 2022-11-26 PROCEDURE — 6360000002 HC RX W HCPCS: Performed by: NURSE PRACTITIONER

## 2022-11-26 PROCEDURE — 1200000000 HC SEMI PRIVATE

## 2022-11-26 PROCEDURE — 96375 TX/PRO/DX INJ NEW DRUG ADDON: CPT

## 2022-11-26 PROCEDURE — 89060 EXAM SYNOVIAL FLUID CRYSTALS: CPT

## 2022-11-26 PROCEDURE — 76882 US LMTD JT/FCL EVL NVASC XTR: CPT

## 2022-11-26 PROCEDURE — 99285 EMERGENCY DEPT VISIT HI MDM: CPT

## 2022-11-26 PROCEDURE — 6370000000 HC RX 637 (ALT 250 FOR IP): Performed by: NURSE PRACTITIONER

## 2022-11-26 PROCEDURE — 20610 DRAIN/INJ JOINT/BURSA W/O US: CPT

## 2022-11-26 PROCEDURE — 94761 N-INVAS EAR/PLS OXIMETRY MLT: CPT

## 2022-11-26 PROCEDURE — 82945 GLUCOSE OTHER FLUID: CPT

## 2022-11-26 PROCEDURE — 96376 TX/PRO/DX INJ SAME DRUG ADON: CPT

## 2022-11-26 PROCEDURE — 73564 X-RAY EXAM KNEE 4 OR MORE: CPT

## 2022-11-26 PROCEDURE — 6360000002 HC RX W HCPCS: Performed by: EMERGENCY MEDICINE

## 2022-11-26 PROCEDURE — 96366 THER/PROPH/DIAG IV INF ADDON: CPT

## 2022-11-26 PROCEDURE — 85652 RBC SED RATE AUTOMATED: CPT

## 2022-11-26 PROCEDURE — 89051 BODY FLUID CELL COUNT: CPT

## 2022-11-26 PROCEDURE — 80053 COMPREHEN METABOLIC PANEL: CPT

## 2022-11-26 PROCEDURE — 2580000003 HC RX 258: Performed by: EMERGENCY MEDICINE

## 2022-11-26 PROCEDURE — 96365 THER/PROPH/DIAG IV INF INIT: CPT

## 2022-11-26 PROCEDURE — 87070 CULTURE OTHR SPECIMN AEROBIC: CPT

## 2022-11-26 PROCEDURE — 74177 CT ABD & PELVIS W/CONTRAST: CPT

## 2022-11-26 PROCEDURE — 6360000004 HC RX CONTRAST MEDICATION: Performed by: EMERGENCY MEDICINE

## 2022-11-26 PROCEDURE — 87205 SMEAR GRAM STAIN: CPT

## 2022-11-26 PROCEDURE — 85025 COMPLETE CBC W/AUTO DIFF WBC: CPT

## 2022-11-26 PROCEDURE — 87075 CULTR BACTERIA EXCEPT BLOOD: CPT

## 2022-11-26 PROCEDURE — 81003 URINALYSIS AUTO W/O SCOPE: CPT

## 2022-11-26 PROCEDURE — 83690 ASSAY OF LIPASE: CPT

## 2022-11-26 PROCEDURE — 84157 ASSAY OF PROTEIN OTHER: CPT

## 2022-11-26 PROCEDURE — 2580000003 HC RX 258: Performed by: NURSE PRACTITIONER

## 2022-11-26 PROCEDURE — 93971 EXTREMITY STUDY: CPT

## 2022-11-26 RX ORDER — OXYCODONE HYDROCHLORIDE AND ACETAMINOPHEN 5; 325 MG/1; MG/1
1 TABLET ORAL EVERY 6 HOURS PRN
Status: DISCONTINUED | OUTPATIENT
Start: 2022-11-26 | End: 2022-12-01

## 2022-11-26 RX ORDER — PROMETHAZINE HYDROCHLORIDE 25 MG/ML
25 INJECTION, SOLUTION INTRAMUSCULAR; INTRAVENOUS ONCE
Status: COMPLETED | OUTPATIENT
Start: 2022-11-26 | End: 2022-11-26

## 2022-11-26 RX ORDER — SODIUM CHLORIDE 9 MG/ML
500 INJECTION, SOLUTION INTRAVENOUS PRN
Status: DISCONTINUED | OUTPATIENT
Start: 2022-11-26 | End: 2022-12-02 | Stop reason: HOSPADM

## 2022-11-26 RX ORDER — ONDANSETRON 2 MG/ML
4 INJECTION INTRAMUSCULAR; INTRAVENOUS ONCE
Status: COMPLETED | OUTPATIENT
Start: 2022-11-26 | End: 2022-11-26

## 2022-11-26 RX ORDER — LEVETIRACETAM 500 MG/1
1000 TABLET ORAL 2 TIMES DAILY
Status: DISCONTINUED | OUTPATIENT
Start: 2022-11-26 | End: 2022-12-02 | Stop reason: HOSPADM

## 2022-11-26 RX ORDER — ENOXAPARIN SODIUM 100 MG/ML
30 INJECTION SUBCUTANEOUS 2 TIMES DAILY
Status: DISCONTINUED | OUTPATIENT
Start: 2022-11-26 | End: 2022-12-02 | Stop reason: HOSPADM

## 2022-11-26 RX ORDER — PANTOPRAZOLE SODIUM 40 MG/1
40 TABLET, DELAYED RELEASE ORAL
Status: DISCONTINUED | OUTPATIENT
Start: 2022-11-27 | End: 2022-12-02 | Stop reason: HOSPADM

## 2022-11-26 RX ORDER — TIZANIDINE 4 MG/1
4 TABLET ORAL EVERY 8 HOURS PRN
Status: DISCONTINUED | OUTPATIENT
Start: 2022-11-26 | End: 2022-12-02 | Stop reason: HOSPADM

## 2022-11-26 RX ORDER — SODIUM CHLORIDE 0.9 % (FLUSH) 0.9 %
5-40 SYRINGE (ML) INJECTION EVERY 12 HOURS SCHEDULED
Status: DISCONTINUED | OUTPATIENT
Start: 2022-11-26 | End: 2022-12-02 | Stop reason: HOSPADM

## 2022-11-26 RX ORDER — POLYETHYLENE GLYCOL 3350 17 G/17G
17 POWDER, FOR SOLUTION ORAL DAILY PRN
Status: DISCONTINUED | OUTPATIENT
Start: 2022-11-26 | End: 2022-11-27

## 2022-11-26 RX ORDER — HYDROXYZINE 50 MG/1
50 TABLET, FILM COATED ORAL 2 TIMES DAILY
Status: DISCONTINUED | OUTPATIENT
Start: 2022-11-26 | End: 2022-12-02 | Stop reason: HOSPADM

## 2022-11-26 RX ORDER — ACETAMINOPHEN 650 MG/1
650 SUPPOSITORY RECTAL EVERY 6 HOURS PRN
Status: DISCONTINUED | OUTPATIENT
Start: 2022-11-26 | End: 2022-12-02 | Stop reason: HOSPADM

## 2022-11-26 RX ORDER — ONDANSETRON 2 MG/ML
4 INJECTION INTRAMUSCULAR; INTRAVENOUS EVERY 6 HOURS PRN
Status: DISCONTINUED | OUTPATIENT
Start: 2022-11-26 | End: 2022-12-02 | Stop reason: HOSPADM

## 2022-11-26 RX ORDER — ESTRADIOL 1 MG/1
0.5 TABLET ORAL DAILY
Status: DISCONTINUED | OUTPATIENT
Start: 2022-11-27 | End: 2022-12-02 | Stop reason: HOSPADM

## 2022-11-26 RX ORDER — MORPHINE SULFATE 4 MG/ML
4 INJECTION, SOLUTION INTRAMUSCULAR; INTRAVENOUS ONCE
Status: COMPLETED | OUTPATIENT
Start: 2022-11-26 | End: 2022-11-26

## 2022-11-26 RX ORDER — SODIUM CHLORIDE 0.9 % (FLUSH) 0.9 %
10 SYRINGE (ML) INJECTION PRN
Status: DISCONTINUED | OUTPATIENT
Start: 2022-11-26 | End: 2022-12-02 | Stop reason: HOSPADM

## 2022-11-26 RX ORDER — ALBUTEROL SULFATE 2.5 MG/3ML
2.5 SOLUTION RESPIRATORY (INHALATION) EVERY 6 HOURS PRN
Status: DISCONTINUED | OUTPATIENT
Start: 2022-11-26 | End: 2022-12-02 | Stop reason: HOSPADM

## 2022-11-26 RX ORDER — ONDANSETRON 4 MG/1
4 TABLET, ORALLY DISINTEGRATING ORAL EVERY 8 HOURS PRN
Status: DISCONTINUED | OUTPATIENT
Start: 2022-11-26 | End: 2022-12-02 | Stop reason: HOSPADM

## 2022-11-26 RX ORDER — PAROXETINE HYDROCHLORIDE 20 MG/1
40 TABLET, FILM COATED ORAL DAILY
Status: DISCONTINUED | OUTPATIENT
Start: 2022-11-27 | End: 2022-12-02 | Stop reason: HOSPADM

## 2022-11-26 RX ORDER — 0.9 % SODIUM CHLORIDE 0.9 %
1000 INTRAVENOUS SOLUTION INTRAVENOUS ONCE
Status: COMPLETED | OUTPATIENT
Start: 2022-11-26 | End: 2022-11-26

## 2022-11-26 RX ORDER — HEPARIN SODIUM (PORCINE) LOCK FLUSH IV SOLN 100 UNIT/ML 100 UNIT/ML
100 SOLUTION INTRAVENOUS PRN
Status: DISCONTINUED | OUTPATIENT
Start: 2022-11-26 | End: 2022-12-02 | Stop reason: HOSPADM

## 2022-11-26 RX ORDER — GABAPENTIN 400 MG/1
800 CAPSULE ORAL 3 TIMES DAILY
Status: DISCONTINUED | OUTPATIENT
Start: 2022-11-26 | End: 2022-12-02 | Stop reason: HOSPADM

## 2022-11-26 RX ORDER — MECLIZINE HYDROCHLORIDE 25 MG/1
25 TABLET ORAL 3 TIMES DAILY PRN
Status: DISCONTINUED | OUTPATIENT
Start: 2022-11-26 | End: 2022-11-26

## 2022-11-26 RX ORDER — SODIUM CHLORIDE 9 MG/ML
1000 INJECTION, SOLUTION INTRAVENOUS CONTINUOUS
Status: DISCONTINUED | OUTPATIENT
Start: 2022-11-26 | End: 2022-12-02 | Stop reason: HOSPADM

## 2022-11-26 RX ORDER — ACETAMINOPHEN 325 MG/1
650 TABLET ORAL EVERY 6 HOURS PRN
Status: DISCONTINUED | OUTPATIENT
Start: 2022-11-26 | End: 2022-12-02 | Stop reason: HOSPADM

## 2022-11-26 RX ORDER — MORPHINE SULFATE 2 MG/ML
2 INJECTION, SOLUTION INTRAMUSCULAR; INTRAVENOUS EVERY 4 HOURS PRN
Status: COMPLETED | OUTPATIENT
Start: 2022-11-26 | End: 2022-11-27

## 2022-11-26 RX ORDER — LEVOTHYROXINE SODIUM 0.12 MG/1
125 TABLET ORAL
Status: DISCONTINUED | OUTPATIENT
Start: 2022-11-27 | End: 2022-12-02 | Stop reason: HOSPADM

## 2022-11-26 RX ORDER — DICYCLOMINE HYDROCHLORIDE 10 MG/ML
20 INJECTION INTRAMUSCULAR ONCE
Status: COMPLETED | OUTPATIENT
Start: 2022-11-26 | End: 2022-11-26

## 2022-11-26 RX ORDER — POLYETHYLENE GLYCOL 3350 17 G
2 POWDER IN PACKET (EA) ORAL
Status: DISCONTINUED | OUTPATIENT
Start: 2022-11-26 | End: 2022-12-02 | Stop reason: HOSPADM

## 2022-11-26 RX ADMIN — GABAPENTIN 800 MG: 400 CAPSULE ORAL at 20:54

## 2022-11-26 RX ADMIN — HYDROXYZINE HYDROCHLORIDE 50 MG: 50 TABLET, FILM COATED ORAL at 20:55

## 2022-11-26 RX ADMIN — SODIUM CHLORIDE 1000 ML: 9 INJECTION, SOLUTION INTRAVENOUS at 10:47

## 2022-11-26 RX ADMIN — MORPHINE SULFATE 2 MG: 2 INJECTION, SOLUTION INTRAMUSCULAR; INTRAVENOUS at 17:51

## 2022-11-26 RX ADMIN — ONDANSETRON 4 MG: 2 INJECTION INTRAMUSCULAR; INTRAVENOUS at 10:48

## 2022-11-26 RX ADMIN — DICYCLOMINE HYDROCHLORIDE 20 MG: 20 INJECTION INTRAMUSCULAR at 15:33

## 2022-11-26 RX ADMIN — ENOXAPARIN SODIUM 30 MG: 100 INJECTION SUBCUTANEOUS at 20:55

## 2022-11-26 RX ADMIN — MORPHINE SULFATE 2 MG: 2 INJECTION, SOLUTION INTRAMUSCULAR; INTRAVENOUS at 22:02

## 2022-11-26 RX ADMIN — PROMETHAZINE HYDROCHLORIDE 25 MG: 25 INJECTION INTRAMUSCULAR; INTRAVENOUS at 12:56

## 2022-11-26 RX ADMIN — ONDANSETRON 4 MG: 2 INJECTION INTRAMUSCULAR; INTRAVENOUS at 22:01

## 2022-11-26 RX ADMIN — MORPHINE SULFATE 4 MG: 4 INJECTION, SOLUTION INTRAMUSCULAR; INTRAVENOUS at 10:50

## 2022-11-26 RX ADMIN — LEVETIRACETAM 1000 MG: 100 INJECTION, SOLUTION INTRAVENOUS at 10:53

## 2022-11-26 RX ADMIN — OXYCODONE AND ACETAMINOPHEN 1 TABLET: 5; 325 TABLET ORAL at 23:26

## 2022-11-26 RX ADMIN — LEVETIRACETAM 1000 MG: 500 TABLET, FILM COATED ORAL at 20:55

## 2022-11-26 RX ADMIN — IOPAMIDOL 75 ML: 755 INJECTION, SOLUTION INTRAVENOUS at 12:04

## 2022-11-26 RX ADMIN — MORPHINE SULFATE 4 MG: 4 INJECTION, SOLUTION INTRAMUSCULAR; INTRAVENOUS at 12:25

## 2022-11-26 RX ADMIN — SODIUM CHLORIDE 1000 ML: 9 INJECTION, SOLUTION INTRAVENOUS at 16:32

## 2022-11-26 ASSESSMENT — PAIN SCALES - GENERAL
PAINLEVEL_OUTOF10: 7
PAINLEVEL_OUTOF10: 7
PAINLEVEL_OUTOF10: 9
PAINLEVEL_OUTOF10: 7
PAINLEVEL_OUTOF10: 6
PAINLEVEL_OUTOF10: 7
PAINLEVEL_OUTOF10: 7

## 2022-11-26 ASSESSMENT — PAIN DESCRIPTION - LOCATION
LOCATION: ABDOMEN
LOCATION: LEG
LOCATION: LEG
LOCATION: KNEE;ABDOMEN
LOCATION: ABDOMEN;KNEE

## 2022-11-26 ASSESSMENT — PAIN DESCRIPTION - FREQUENCY: FREQUENCY: CONTINUOUS

## 2022-11-26 ASSESSMENT — PAIN DESCRIPTION - ORIENTATION
ORIENTATION: LEFT
ORIENTATION: LEFT

## 2022-11-26 ASSESSMENT — ENCOUNTER SYMPTOMS
RESPIRATORY NEGATIVE: 1
ABDOMINAL PAIN: 1
ALLERGIC/IMMUNOLOGIC NEGATIVE: 1
EYES NEGATIVE: 1

## 2022-11-26 ASSESSMENT — PAIN DESCRIPTION - DESCRIPTORS
DESCRIPTORS: SPASM;CRAMPING
DESCRIPTORS: ACHING

## 2022-11-26 ASSESSMENT — PAIN DESCRIPTION - PAIN TYPE: TYPE: ACUTE PAIN

## 2022-11-26 NOTE — H&P
V2.0  History and Physical      Name:  Gurwinder Mccarthy /Age/Sex: 1970  (45 y.o. female)   MRN & CSN:  [id number] & [id number] Encounter Date/Time: 2022 2:51 PM EST   Location:  [facility] PCP: Светлана Rich MD       Hospital Day: 1    Assessment and Plan:   Gurwinder Mccarthy is a 45 y.o. female with a pmh of hypothyroidism, pseudoseizures, gastric bypass, chronic pain, drug-seeking behavior, septic arthritis 6 weeks ago who presents with Ileus ([address] and left knee pain    Hospital Problems             Last Modified POA    * (Principal) Ileus ([address] 2022 Yes     Intractable abdominal pain  Ileus or low-grade small bowel obstruction  -Progressively worsening abdominal pain, nausea, vomiting with intermittent diarrhea for a week  -Abdomen CT indicated short segment mildly dilated loop of small bowel in the left mid abdomen, could represent a ileus or low-grade obstruction  -Admit to medical surgical floor  -General surgery consult  -N.p.o.  -Hydration  -Pain management  -Check C. difficile    Left knee pain  -Recently with admitted to hospital in  for septic arthritis  -Completed IV antibiotics a week ago  -Persistent left knee pain and swelling  -Normal CRP and ESR  -Appreciate ED provider will tapped left knee  -Orthopedics consult  -No signs of sepsis, will hold antibiotics until receive joint fluid study    Hypothyroidism  -Continue Synthroid 125 MCG daily    Pseudoseizures  -Continue Keppra    Anxiety and depression  -Continue Paxil, hydroxyzine    Hormone replacement  -On estrogen 0.5 Mg daily for years, continue    Chronic pain  Drug-seeking behavior  -Continue oxycodone and gabapentin  -Patient requested Dilaudid for pain control  -Will add morphine 2 Mg 4 6 doses for now due to possible ileus/small bowel obstruction    Morbid obese with a BMI of 41  SP gastric bypass surgery 3 years ago  -Lifestyle change in discharge    DVT prophylaxis  Lovenox    Disposition:   Current Living situation: Home  Expected Disposition: Home  Estimated D/C: 2 to 3 days    Diet No diet orders on file   DVT Prophylaxis [x] Lovenox, []  Heparin, [] SCDs, [] Ambulation,  [] Eliquis, [] Xarelto   Code Status Prior   Surrogate Decision Maker/ POA      History from:     patient    History of Present Illness:     Chief Complaint: abdominal pain, left knee pain  Bárbara Garcia is a 45 y.o. female with pmh of hypothyroidism, anxiety and depression, gastric bypass, lupus, septic arthritis in left knee who presents with with abdominal pain and left knee pain. Patient stated she was diagnosed of septic arthritis 6 weeks ago. She completed IV antibiotics last week. Patient started having intractable abdominal pain nausea and intermittent diarrhea. She started vomiting 3 days ago. Patient reported she vomited bile stuff out. Patient stated she could barely keep anything down. Patient denied chills no fever. Patient also complaining of persistent left knee pain and swelling since last week. Abdomen CT in ED suspect ileus or low-grade small bowel obstruction. Her white cell, CRP, ESR are normal.  Duplex of left leg ruled out DVT. Patient's left knee mild swelling, mild warmth and tender to palpation. Patient will be admitted to hospital for general surgery and orthopedics consult. Review of Systems: Need 10 Elements   Review of Systems   Constitutional: Negative. HENT: Negative. Eyes: Negative. Respiratory: Negative. Cardiovascular: Negative. Gastrointestinal:  Positive for abdominal pain. Endocrine: Negative. Genitourinary: Negative. Musculoskeletal:  Positive for arthralgias. Skin: Negative. Allergic/Immunologic: Negative. Neurological: Negative. Hematological: Negative. Psychiatric/Behavioral: Negative.          Objective:   No intake or output data in the 24 hours ending 11/26/22 1532   Vitals:   Vitals:    11/26/22 1233 11/26/22 1318 11/26/22 1335 11/26/22 1400   BP: 113/71 134/71 134/78 113/84   Pulse: (!) 49 59 50 (!) 48   Resp:    16   Temp:    97.8 °F (36.6 °C)   TempSrc:       SpO2: 100% 100% 92% 99%   Weight:       Height:           Medications Prior to Admission     Prior to Admission medications    Medication Sig Start Date End Date Taking? Authorizing Provider   estradiol (ESTRACE) 0.5 MG tablet Take 1 tablet by mouth daily 11/9/22   Keaton Spears MD   ondansetron (ZOFRAN-ODT) 4 MG disintegrating tablet Take 1 tablet by mouth 3 times daily as needed for Nausea or Vomiting 11/9/22   Keaton Spears MD   levETIRAcetam (KEPPRA) 1000 MG tablet Take 1 tablet by mouth twice daily 10/25/22   Keaton Spears MD   levothyroxine (SYNTHROID) 125 MCG tablet Take 1 tablet by mouth Daily 10/25/22   Keaton Spears MD   predniSONE (DELTASONE) 10 MG tablet 4 tablets once daily for 2 days then 3 tablets once daily for 2 days then 2 tablets once daily for 2 days then 1 tablet once daily for 2 days.  10/5/22   Valentino Perches, MD   pantoprazole (PROTONIX) 40 MG tablet Take 1 tablet by mouth twice daily 9/20/22   Keaton Spears MD   hydrOXYzine HCl (ATARAX) 50 MG tablet Take 50 mg by mouth 2 times daily    Historical Provider, MD   PARoxetine (PAXIL) 30 MG tablet TAKE 1 & 1/2 (ONE & ONE-HALF) TABLETS BY MOUTH NIGHTLY 9/12/22   Keaton Spears MD   ferrous sulfate (IRON 325) 325 (65 Fe) MG tablet Take 1 tablet by mouth 2 times daily 6/25/22   Keaton Spears MD   albuterol sulfate HFA (PROVENTIL;VENTOLIN;PROAIR) 108 (90 Base) MCG/ACT inhaler Inhale 2 puffs into the lungs 4 times daily 6/24/22   Keaton Spears MD   albuterol (PROVENTIL) (2.5 MG/3ML) 0.083% nebulizer solution Take 3 mLs by nebulization every 6 hours as needed for Wheezing 9/9/21   Diana Vega MD   oxyCODONE-acetaminophen (PERCOCET) 5-325 MG per tablet TAKE 1 TABLET BY MOUTH EVERY 6 HOURS AS NEEDED FOR 28 DAYS 8/28/21   Historical Provider, MD   NARCAN 4 MG/0.1ML LIQD nasal spray USE AS DIRECTED AS NEEDED FOR SUSPECTED OPIOID OVERDOSE 8/20/21   Historical Provider, MD   Melatonin 10 MG TABS Take 10-20 mg by mouth nightly as needed    Historical Provider, MD   Cyanocobalamin (VITAMIN B 12 PO) Take 1 tablet by mouth daily    Historical Provider, MD   meclizine (ANTIVERT) 25 MG tablet Take 25 mg by mouth 3 times daily as needed for Dizziness    Historical Provider, MD   betamethasone valerate (VALISONE) 0.1 % ointment APPLY OINTMENT TO AFFECTED AREA TWICE DAILY TO HANDS AND ELBOWS FOR 21 DAYS 3/26/21   Historical Provider, MD   potassium gluconate 550 mg tablet Take 1 tablet by mouth daily 3/25/21   Historical Provider, MD   tiZANidine (ZANAFLEX) 4 MG tablet Take 4 mg by mouth every 8 hours as needed  3/3/21   Historical Provider, MD   Cholecalciferol (VITAMIN D3) 5000 units TABS Take 1 tablet by mouth daily    Historical Provider, MD   Multiple Vitamin (MVI, BARIATRIC ADVANTAGE MULTI-FORMULA, CHEW TAB) Take 1 tablet by mouth daily 2/22/19   Vitaly Akins MD   Calcium Citrate-Vitamin D (CALCIUM + VIT D, BARIATRIC ADVANTAGE, CHEWABLE TABLET) Take 1 tablet by mouth 2 times daily 2/22/19   Vitaly Akins MD   gabapentin (NEURONTIN) 800 MG tablet Take 800 mg by mouth 3 times daily    Historical Provider, MD       Physical Exam: Need 8 Elements   Physical Exam  HENT:      Head: Normocephalic. Nose: Nose normal.      Mouth/Throat:      Mouth: Mucous membranes are moist.   Eyes:      Pupils: Pupils are equal, round, and reactive to light. Cardiovascular:      Rate and Rhythm: Normal rate and regular rhythm. Pulmonary:      Effort: Pulmonary effort is normal.   Abdominal:      General: Abdomen is flat. Palpations: Abdomen is soft. Tenderness: There is abdominal tenderness. Musculoskeletal:         General: Swelling and tenderness present. Cervical back: Normal range of motion. Comments: Left knee mild swelling and warmth, tender to palpation   Skin:     General: Skin is warm.       Capillary Refill: Capillary refill takes less than 2 seconds. Neurological:      General: No focal deficit present. Mental Status: She is alert and oriented to person, place, and time. Psychiatric:         Mood and Affect: Mood normal.         Behavior: Behavior normal.          Past Medical History:   PMHx   Past Medical History:   Diagnosis Date    Acid reflux     Anemia     Anxiety     Arthritis     Hands, Back And Ankles    Arthritis     Bleeding ulcer 2014    \"I Had Ulcers In My Stomach And Colon\"/ per pt on 8/12/2019\"they said recently having some blood in my stomach- in July ( 2019)could not find where coming from \"    Bronchitis Last Episode 2014    Chronic back pain     Chronic pain     [facility]    COPD (chronic obstructive pulmonary disease) [facility]     Sees Dr. Tobais Carter    Depression     Disease of blood and blood forming organ     Fibromyalgia Dx 2013    GERD (gastroesophageal reflux disease)     H/O echocardiogram 08/11/2015    EF >55%. LA to be at the upper limit of normal in size. LV hypertrophy with normal LV systolic, but abnormal diastolic function. Normal valvular structures and function. H/O echocardiogram 08/30/2018    EF 55-60%    Hiatal hernia     History of blood transfusion 09/2015 And 2018    No Reaction To Blood Transfusions Received    History of sleeve gastrectomy     [place] (hard of hearing)     Right Ear    Hx of cardiac catheterization 10/24/2010    Angiographically normal coronary arteries w/ normal LV function and wall motion. Hx of transesophageal echocardiography (PILO) for monitoring 12/02/2010    EF 55-60%. WNL.     HX OTHER MEDICAL     per old chart hx of sepsis and dx left 5th metatarsal MSSA osteomylitis- consult with Dr Josefa Pederson     \"very difficulty IV stick- had mediport infection in the past- then put picc line in and removed 8/7/2019 now going to put new mediport in\"( 8/15/2019)    Hypertension     follow with  ? name Hypertension     Immune deficiency disorder ([facility] [address]     Kidney cysts     \"they found that I have a kidney cyst but not sure which side\" per pt on 7/15/2020    Kidney stone     Lupus ([facility] [address] Dx 2013    \"Rheumatoid Lupus\"    MDRO (multiple drug resistant organisms) resistance     4 months ago chest from mediport    Morbid obesity ([facility] [address]     MRSA bacteremia     Nausea     \"Most Of The Time\"    Osteomyelitis of fifth toe of left foot (HCC)     Pain management     Sees Dr. Juana Soriano, Once A Month    Pancreatitis chronic Dx     Pneumonia Dx -15    Seizures ([facility] [address]     Shortness of breath on exertion     Shortness of breath on exertion     Sleep apnea     Has CPAP\"no longer use the cpap since lost weight\"    Staph infection Dx -15    Left Foot    Staph infection Dx -15    \"Left Foot\"    Teeth missing     Upper And Lower    Thyroid disease     Wears glasses     To Read     PSHX:  has a past surgical history that includes Lung removal, partial (Left, );  section (1991); Foot surgery (Left, Last Done In ); Dental surgery; Cholecystectomy, laparoscopic ('); other surgical history (1998); other surgical history (Last Done 7-15-16); Tonsillectomy and adenoidectomy (); Appendectomy (1998); Upper gastrointestinal endoscopy (2018); Lap Band (~); Hysterectomy (10/1997); Endoscopy, colon, diagnostic (Last Done In ); Colonoscopy (Last Done In ); Cardiac catheterization (10/24/2010); Sleeve Gastrectomy (N/A, 2019); hiatal hernia repair (N/A, 2019); Upper gastrointestinal endoscopy (N/A, 2019); Foot Debridement (Left, 2019); Upper gastrointestinal endoscopy (N/A, 2019); Catheter Removal (N/A, 2019); Upper gastrointestinal endoscopy (N/A, 2019); INSERTION / REMOVAL / REPLACEMENT VENOUS ACCESS CATHETER (N/A, 8/15/2019); Upper gastrointestinal endoscopy (N/A, 10/14/2019); [place] [id number] Surgery (N/A, 1/15/2020); [address] Surgery (N/A, 2020);  Upper gastrointestinal endoscopy (N/A, 7/17/2020); Leg biopsy excision (Left, 3/8/2021); Upper gastrointestinal endoscopy (N/A, 5/28/2021); Port Surgery (Left, 8/3/2021); Jet-en-Y Gastric Bypass (N/A, 8/16/2021); Foot Debridement (Left, 4/15/2022); Abdomen surgery; Appendectomy; Cholecystectomy; Hysterectomy; hernia repair; Tonsillectomy; vascular surgery; and Knee arthroscopy (Left, 9/20/2022). Allergies: Allergies   Allergen Reactions    Aspirin Palpitations     \"My Heart Rate Elevates\"    Compazine [Prochlorperazine] Rash    Shellfish-Derived Products Swelling    Toradol [Ketorolac Tromethamine] Rash    Haldol [Haloperidol] Other (See Comments)     States \"shook severely and had to have Benadryl\"    Asa [Aspirin]     Compazine [Prochlorperazine]     Haldol [Haloperidol]      Shaking      Reglan [Metoclopramide] Itching    Reglan [Metoclopramide]     Toradol [Ketorolac Tromethamine]      Fam HX: family history includes Arthritis in her mother; Asthma in her father and son; Cancer in her father; Diabetes in her father and mother; Heart Disease in her mother; High Blood Pressure in her brother, father, and mother; High Cholesterol in her mother; Lupus in her daughter; Obesity in her mother; Other in her daughter; Vision Loss in her mother and son.   Soc HX:   Social History     Socioeconomic History    Marital status:      Spouse name: None    Number of children: None    Years of education: None    Highest education level: None   Tobacco Use    Smoking status: Never    Smokeless tobacco: Never   Vaping Use    Vaping Use: Never used   Substance and Sexual Activity    Alcohol use: Never    Drug use: Never    Sexual activity: Not Currently   Social History Narrative    ** Merged History Encounter **          Social Determinants of Health     Financial Resource Strain: Low Risk     Difficulty of Paying Living Expenses: Not hard at all   Food Insecurity: No Food Insecurity    Worried About [address] in the Last Year: Never true    Ran Out of Food in the Last Year: Never true       Medications:   Medications:    dicyclomine  20 mg IntraMUSCular Once      Infusions:   PRN Meds: heparin flush, 100 Units, PRN      Labs      CBC:   Recent Labs     11/26/22  1005   WBC 4.5   HGB 9.7*        BMP:    Recent Labs     11/26/22  1005      K 4.1      CO2 24   BUN 18   CREATININE 0.7   GLUCOSE 102*     Hepatic:   Recent Labs     11/26/22  1005   AST 13*   ALT 11   BILITOT 0.2   ALKPHOS 107     Lipids:   Lab Results   Component Value Date/Time    CHOL 146 02/10/2021 01:10 AM    HDL 51 02/10/2021 01:10 AM    TRIG 52 02/10/2021 01:10 AM     Hemoglobin A1C:   Lab Results   Component Value Date/Time    LABA1C 5.2 08/03/2021 10:00 AM     TSH: No results found for: TSH  Troponin:   Lab Results   Component Value Date/Time    TROPONINT <0.010 09/26/2022 06:37 AM    TROPONINT <0.010 09/26/2022 06:35 AM    TROPONINT <0.010 09/25/2022 11:00 PM     Lactic Acid: No results for input(s): LACTA in the last 72 hours. BNP: No results for input(s): PROBNP in the last 72 hours.   UA:  Lab Results   Component Value Date/Time    NITRU NEGATIVE 11/26/2022 09:36 AM    COLORU YELLOW 11/26/2022 09:36 AM    WBCUA 34 09/19/2022 01:25 PM    RBCUA NONE SEEN 09/19/2022 01:25 PM    MUCUS RARE 09/19/2022 01:25 PM    TRICHOMONAS NONE SEEN 09/19/2022 01:25 PM    YEAST RARE 07/28/2017 03:00 PM    BACTERIA RARE 09/19/2022 01:25 PM    CLARITYU CLEAR 11/26/2022 09:36 AM    SPECGRAV 1.020 11/26/2022 09:36 AM    LEUKOCYTESUR NEGATIVE 11/26/2022 09:36 AM    UROBILINOGEN 0.2 11/26/2022 09:36 AM    BILIRUBINUR NEGATIVE 11/26/2022 09:36 AM    BLOODU NEGATIVE 11/26/2022 09:36 AM    KETUA NEGATIVE 11/26/2022 09:36 AM    AMORPHOUS RARE 07/10/2017 02:10 PM     Urine Cultures: No results found for: LABURIN  Blood Cultures: No results found for: BC  No results found for: BLOODCULT2  Organism:   Lab Results   Component Value Date/Time    ORG PSAR 02/03/2016 11:10 PM       Imaging/Diagnostics Last 24 Hours   XR KNEE LEFT (MIN 4 VIEWS)    Result Date: 11/26/2022  EXAMINATION: FOUR XRAY VIEWS OF THE LEFT KNEE 11/26/2022 10:33 am COMPARISON: None. HISTORY: ORDERING SYSTEM PROVIDED HISTORY: Fall, left knee pain TECHNOLOGIST PROVIDED HISTORY: Reason for exam:->Fall, left knee pain Reason for Exam: Fall, left knee pain Additional signs and symptoms: Fall, left knee pain Relevant Medical/Surgical History: Fall, left knee pain FINDINGS: Small suprapatellar bursal effusion. No radiographic evidence of acute fracture or dislocation. Multi compartment moderate degenerative arthritic change noted worst in the medial tibiofemoral joint space. No bone destruction, malignant-appearing periosteal reaction or other acute soft tissue abnormality. Small suprapatellar bursal effusion, multi compartment moderate degenerative arthritic change worst in the medial tibiofemoral joint space. No other acute radiographic abnormality left knee. CT ABDOMEN PELVIS W IV CONTRAST Additional Contrast? None    Result Date: 11/26/2022  EXAMINATION: CT OF THE ABDOMEN AND PELVIS WITH CONTRAST 11/26/2022 11:32 am TECHNIQUE: CT of the abdomen and pelvis was performed with the administration of intravenous contrast. Multiplanar reformatted images are provided for review. Automated exposure control, iterative reconstruction, and/or weight based adjustment of the mA/kV was utilized to reduce the radiation dose to as low as reasonably achievable. COMPARISON: CT 09/19/2022 HISTORY: ORDERING SYSTEM PROVIDED HISTORY: Upper abdominal pain TECHNOLOGIST PROVIDED HISTORY: Reason for exam:->Upper abdominal pain Additional Contrast?->None Decision Support Exception - unselect if not a suspected or confirmed emergency medical condition->Emergency Medical Condition (MA) Reason for Exam: Upper abdominal pain FINDINGS: Lower Chest: Visualized lung bases demonstrate no acute abnormality. Organs:  The liver, spleen, pancreas, adrenal glands, and kidneys demonstrate no acute abnormality. Prior cholecystectomy. Again seen is pneumobilia. Similar mild prominence of the common bile duct. Nonobstructing small intrarenal calculi. GI/Bowel: No evidence to suggest acute appendicitis. 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 Again seen is mild distention of the gastric pouch with ingested contents. Short-segment mildly dilated loop of small bowel is seen in the left mid abdomen. Mild to moderate stool burden is seen in the colon. Pelvis: The urinary bladder is unremarkable. Peritoneum/Retroperitoneum: No lymphadenopathy is seen. No intraperitoneal free air or significant free fluid. Bones/Soft Tissues: No acute bony abnormality is seen. Short segment mildly dilated loop of small bowel in the left mid abdomen. This is more prominent than the prior CT. This could represent an ileus or low-grade obstruction. 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 Again seen is mild distension of the gastric pouch with ingested contents. Prior cholecystectomy. Again seen is pneumobilia and mild prominence of the common bile duct. US EXTREMITY LEFT NON VASC LIMITED    Result Date: 11/26/2022  EXAMINATION: NONVASCULAR ULTRASOUND OF THE LEFT EXTREMITY; VENOUS ULTRASOUND OF THE LEFT EXTREMITY 11/26/2022 10:45 am COMPARISON: 11/02/2022 09/19/2022 10/11/2022 HISTORY: ORDERING SYSTEM PROVIDED HISTORY: knee pain TECHNOLOGIST PROVIDED HISTORY: Reason for exam:->knee pain FINDINGS: Focused ultrasound demonstrates a complex collection in the suprapatellar region, measuring 9.4 x 5.8 x 8.5 cm. This has been described on prior studies. .  It appears increased in size compared to 09/19, but decreased in size in its greatest dimension compared to 10/11 Visualized veins are patent and free of thrombus. No DVT identified. Persistent complex collection in the suprapatellar region.   This may relate to prior surgery or trauma. Correlate for signs of infection or bursitis. No DVT noted     VL DUP LOWER EXTREMITY VENOUS LEFT    Result Date: 11/26/2022  EXAMINATION: NONVASCULAR ULTRASOUND OF THE LEFT EXTREMITY; VENOUS ULTRASOUND OF THE LEFT EXTREMITY 11/26/2022 10:45 am COMPARISON: 11/02/2022 09/19/2022 10/11/2022 HISTORY: ORDERING SYSTEM PROVIDED HISTORY: knee pain TECHNOLOGIST PROVIDED HISTORY: Reason for exam:->knee pain FINDINGS: Focused ultrasound demonstrates a complex collection in the suprapatellar region, measuring 9.4 x 5.8 x 8.5 cm. This has been described on prior studies. .  It appears increased in size compared to 09/19, but decreased in size in its greatest dimension compared to 10/11 Visualized veins are patent and free of thrombus. No DVT identified. Persistent complex collection in the suprapatellar region. This may relate to prior surgery or trauma. Correlate for signs of infection or bursitis.  No DVT noted       Personally reviewed Lab Studies, Imaging, and discussed case with dr. Kathleen Hannon    Electronically signed by Samantha Mai CNP on 11/26/2022 at 3:32 PM

## 2022-11-26 NOTE — ED NOTES
Dr Nilo Krishnamurthy notified patient requesting pain and nausea medication     Willie Hernandez RN  11/26/22 2410

## 2022-11-26 NOTE — ED NOTES
ED TO INPATIENT SBAR HANDOFF    Patient Name: Kaye Hall   :  1970  45 y.o. MRN:  [id number]  [facility]  ED Room #:  ED31/ED-31  Family/Caregiver Present no   Restraints no   Sitter no   Sepsis Risk Score Sepsis Risk Score: 0.78    Situation  Code Status: Prior No additional code details. Allergies: Aspirin, Compazine [prochlorperazine], Shellfish-derived products, Toradol [ketorolac tromethamine], Haldol [haloperidol], Asa [aspirin], Compazine [prochlorperazine], Haldol [haloperidol], Reglan [metoclopramide], Reglan [metoclopramide], and Toradol [ketorolac tromethamine]  Weight: Patient Vitals for the past 96 hrs (Last 3 readings):   Weight   22 0925 240 lb (108.9 kg)     Arrived from: home  Chief Complaint:   Chief Complaint   Patient presents with    Abdominal Pain     x1 week w/nausea     Knee Pain     Left knee x1 week (hx of blood clots)     Hospital Problem/Diagnosis:  Active Problems:    * No active hospital problems. *  Resolved Problems:    * No resolved hospital problems. *    Imaging:   CT ABDOMEN PELVIS W IV CONTRAST Additional Contrast? None   Preliminary Result   Short segment mildly dilated loop of small bowel in the left mid abdomen. This is more prominent than the prior CT. This could represent an ileus or   low-grade obstruction. 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 Again seen is mild   distension of the gastric pouch with ingested contents. Prior cholecystectomy. Again seen is pneumobilia and mild prominence of the   common bile duct. VL DUP LOWER EXTREMITY VENOUS LEFT   Final Result   Persistent complex collection in the suprapatellar region. This may relate   to prior surgery or trauma. Correlate for signs of infection or bursitis. No DVT noted         US EXTREMITY LEFT NON VASC LIMITED   Final Result   Persistent complex collection in the suprapatellar region. This may relate   to prior surgery or trauma. Correlate for signs of infection or bursitis. No DVT noted         XR KNEE LEFT (MIN 4 VIEWS)   Final Result   Small suprapatellar bursal effusion, multi compartment moderate degenerative   arthritic change worst in the medial tibiofemoral joint space. No other   acute radiographic abnormality left knee. Abnormal labs:   Abnormal Labs Reviewed   CBC WITH AUTO DIFFERENTIAL - Abnormal; Notable for the following components:       Result Value    RBC 3.84 (*)     Hemoglobin 9.7 (*)     Hematocrit 31.5 (*)     MCH 25.3 (*)     MCHC 30.8 (*)     Monocytes % 8.4 (*)     Eosinophils % 7.1 (*)     All other components within normal limits   COMPREHENSIVE METABOLIC PANEL - Abnormal; Notable for the following components:    Glucose 102 (*)     AST 13 (*)     All other components within normal limits     Critical values: no     Abnormal Assessment Findings: Abdominal pain, N/V    Background  History:   Past Medical History:   Diagnosis Date    Acid reflux     Anemia     Anxiety     Arthritis     Hands, Back And Ankles    Arthritis     Bleeding ulcer 2014    \"I Had Ulcers In My Stomach And Colon\"/ per pt on 8/12/2019\"they said recently having some blood in my stomach- in July ( 2019)could not find where coming from \"    Bronchitis Last Episode 2014    Chronic back pain     [facility]     [facility]    COPD (chronic obstructive pulmonary disease) ([facility])     Sees Dr. Cesar Vickers Depression     Disease of blood and blood forming organ     Fibromyalgia Dx 2013    GERD (gastroesophageal reflux disease)     H/O echocardiogram 08/11/2015    EF >55%. LA to be at the upper limit of normal in size. LV hypertrophy with normal LV systolic, but abnormal diastolic function. Normal valvular structures and function.      H/O echocardiogram 08/30/2018    EF 55-60%    Hiatal hernia     History of blood transfusion 09/2015 And 2018    No Reaction To Blood Transfusions Received   Jayden Robbins History of sleeve gastrectomy     [place] (hard of hearing)     Right Ear    Hx of cardiac catheterization 10/24/2010    Angiographically normal coronary arteries w/ normal LV function and wall motion.  Hx of transesophageal echocardiography (PILO) for monitoring 12/02/2010    EF 55-60%. WNL.    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- consult with Dr Luz Nur     \"very difficulty IV stick- had mediport infection in the past- then put picc line in and removed 8/7/2019 now going to put new mediport in\"( 8/15/2019)    Hypertension     follow with Dr ? name    Hypertension     Immune deficiency disorder ([address]     Kidney cysts     \"they found that I have a kidney cyst but not sure which side\" per pt on 7/15/2020    Kidney stone     Lupus ([address] Dx 2013    \"Rheumatoid Lupus\"    MDRO (multiple drug resistant organisms) resistance     4 months ago chest from mediport    Morbid obesity ([address]     MRSA bacteremia     Nausea     \"Most Of The Time\"    Osteomyelitis of fifth toe of left foot (HCC)     Pain management     Sees Dr. Eleni Magana, Once A Month    Pancreatitis chronic Dx 2001    Pneumonia Dx 11-15    Seizures ([address]     Shortness of breath on exertion     Shortness of breath on exertion     Sleep apnea     Has CPAP\"no longer use the cpap since lost weight\"    Staph infection Dx 11-15    Left Foot    Staph infection Dx 11-15    \"Left Foot\"    Teeth missing     Upper And Lower    Thyroid disease     Wears glasses     To Read       Assessment    Vitals/MEWS: MEWS Score: 2  Level of Consciousness: Alert (0)   Vitals:    11/26/22 1233 11/26/22 1318 11/26/22 1335 11/26/22 1400   BP: 113/71 134/71 134/78 113/84   Pulse: (!) 49 59 50 (!) 48   Resp:    16   Temp:    97.8 °F (36.6 °C)   TempSrc:       SpO2: 100% 100% 92% 99%   Weight:       Height:         FiO2 (%):   O2 Flow Rate: O2 Device: None (Room air)    Cardiac Rhythm:    Pain Assessment:  [] Verbal [] Nelma Braver Scale  Pain Scale: Pain Assessment  Pain Assessment: 0-10  Pain Level: 7  Pain Location: Abdomen, Knee  Pain Orientation: Left  Pain Descriptors: Spasm, Cramping  Pain Type: Acute pain  Pain Frequency: Continuous  Last documented pain score (0-10 scale) Pain Level: 7  Last documented pain medication administered: Morphine 1230  Mental Status: oriented, alert, coherent, logical and thought processes intact  NIH Score:    C-SSRS:    Bedside swallow:    Fer Coma Scale (GCS): Fer Coma Scale  Eye Opening: Spontaneous  Best Verbal Response: Oriented  Best Motor Response: Obeys commands  [place] Coma Scale Score: 15  Active LDA's:    PO Status:   Pertinent or High Risk Medications/Drips: no   o If Yes, please provide details:   Pending Blood Product Administration: no     You may also review the ED PT Care Timeline found under the Summary Nursing Index tab. Recommendation    Pending orders Consult to general surgery  Plan for Discharge (if known):    Additional Comments:    If any further questions, please call Sending RN at [phone]    Electronically signed by: Electronically signed by Abimael Rodriguez RN on 11/26/2022 at 2:26 PM     Abimael Rodriguez RN  11/26/22 [id number]

## 2022-11-26 NOTE — ED TRIAGE NOTES
Left knee pain x1week. H/O blood clots. Abd pain x 1week. Has been vomiting off and on all week. Poor appetite, increased pain with eating.

## 2022-11-26 NOTE — ED PROVIDER NOTES
EMERGENCY DEPARTMENT ENCOUNTER      CHIEF COMPLAINT:   Abdominal pain  Left knee pain    HPI: Graham Palacios is a 45 y.o. female who presents to the Emergency Department complaining of abdominal pain associated with nausea. The patient states her symptoms started 1 week ago. She states that the pain is located in the upper abdomen as well as the right side. It feels achy and cramping in nature. She rates it as 7 out of 10 pain. It is associated with nausea and decreased ability to eat. She states that anytime she tries to eat anything she becomes very nauseated and develops pain and so she has not been eating much. Symptoms are better if she avoids eating. The patient also complains of left knee pain and swelling. She has a history of recent septic arthritis for which she just finished outpatient IV antibiotics through her port. She states that over the past few days the knee has become increasingly swollen and painful. The patient denies fevers, chills, hematemesis, bloody stools, flank pain, or any other complaints. REVIEW OF SYSTEMS:  CONSTITUTIONAL:  Denies fever, chills, weight loss or weakness  EYES:  Denies photophobia or discharge  ENT:  Denies sore throat or ear pain  CARDIOVASCULAR:  Denies chest pain, palpitations or swelling  RESPIRATORY:  Denies cough or shortness of breath  GI: See HPI  MUSCULOSKELETAL: See HPI  SKIN:  No rash  NEUROLOGIC:  Denies headache, focal weakness or sensory changes  All systems negative except as marked. \"Remaining review of systems reviewed and negative. I have reviewed the nursing triage documentation and agree unless otherwise noted below. \"    PAST MEDICAL HISTORY:   Past Medical History:   Diagnosis Date    Acid reflux     Anemia     Anxiety     Arthritis     Hands, Back And Ankles    Arthritis     Bleeding ulcer 2014    \"I Had Ulcers In My Stomach And Colon\"/ per pt on 8/12/2019\"they said recently having some blood in my stomach- in July ( 2019)could not find where coming from \"    Bronchitis Last Episode 2014    Chronic back pain     Chronic pain     [facility]    COPD (chronic obstructive pulmonary disease) ([facility]     Sees Dr. Charlee Iyer    Depression     Disease of blood and blood forming organ     Fibromyalgia Dx 2013    GERD (gastroesophageal reflux disease)     H/O echocardiogram 08/11/2015    EF >55%. LA to be at the upper limit of normal in size. LV hypertrophy with normal LV systolic, but abnormal diastolic function. Normal valvular structures and function. H/O echocardiogram 08/30/2018    EF 55-60%    Hiatal hernia     History of blood transfusion 09/2015 And 2018    No Reaction To Blood Transfusions Received    History of sleeve gastrectomy     [place] (hard of hearing)     Right Ear    Hx of cardiac catheterization 10/24/2010    Angiographically normal coronary arteries w/ normal LV function and wall motion. Hx of transesophageal echocardiography (PILO) for monitoring 12/02/2010    EF 55-60%. WNL.     HX OTHER MEDICAL     per old chart hx of sepsis and dx left 5th metatarsal MSSA osteomylitis- consult with Dr Rigoberto Gomez     \"very difficulty IV stick- had mediport infection in the past- then put picc line in and removed 8/7/2019 now going to put new mediport in\"( 8/15/2019)    Hypertension     follow with Dr ? name    Hypertension     Immune deficiency disorder [facility])     Kidney cysts     \"they found that I have a kidney cyst but not sure which side\" per pt on 7/15/2020    Kidney stone     Lupus ([address] Dx 2013    \"Rheumatoid Lupus\"    MDRO (multiple drug resistant organisms) resistance     4 months ago chest from mediport    Morbid obesity ([address]     MRSA bacteremia     Nausea     \"Most Of The Time\"    Osteomyelitis of fifth toe of left foot (HCC)     Pain management     Sees Dr. Nina Yi, Once A Month    Pancreatitis chronic Dx 2001    Pneumonia Dx 11-15    Seizures (HCC)     Shortness of breath on exertion Shortness of breath on exertion     Sleep apnea     Has CPAP\"no longer use the cpap since lost weight\"    Staph infection Dx 11-15    Left Foot    Staph infection Dx 11-15    \"Left Foot\"    Teeth missing     Upper And Lower    Thyroid disease     Wears glasses     To Read       CURRENT MEDICATIONS:   Home medications reviewed. SURGICAL HISTORY:   Past Surgical History:   Procedure Laterality Date    ABDOMEN SURGERY      APPENDECTOMY  02/1998    Done When Tubes And Ovaries Were Removed    APPENDECTOMY      CARDIAC CATHETERIZATION  10/24/2010    CATHETER REMOVAL N/A 7/16/2019    PORT REMOVAL performed by Michel Modi MD at [address]   08/27/1991    CHOLECYSTECTOMY      CHOLECYSTECTOMY, LAPAROSCOPIC  1990's    COLONOSCOPY  Last Done In 2000's    DENTAL SURGERY      Teeth Extracted In Past    ENDOSCOPY, COLON, DIAGNOSTIC  Last Done In 2018    FOOT DEBRIDEMENT Left 6/16/2019    FIRST METATARSAL DEBRIDEMENT INCISION AND DRAINAGE. EXCISION OF ULCER.  RESECTION OF BONE. 1ST METATARSAL POWER LAVAGE AND BONE CEMENT performed by Zana Rodriguez DPM at [address] [facility] Left 4/15/2022    LEFT FOOT ABSCESS DEBRIDEMENT INCISION AND DRAINAGE, CYST REMOVAL performed by Zana Rodriguez DPM at [address] [facility] Left Last Done In 2016     Dr. Ginette Dumont, \" About 6 Surgeries Done Because Of Staph Infection\"    HERNIA REPAIR      HIATAL HERNIA REPAIR N/A 2/12/2019    HERNIA HIATAL LAPAROSCOPIC ROBOTIC performed by Michel Modi MD at [facility] [address])  10/1997    Partial Abdominal Hysterectomy    HYSTERECTOMY (CERVIX STATUS UNKNOWN)      INSERTION / REMOVAL / REPLACEMENT VENOUS ACCESS CATHETER N/A 8/15/2019    PORT INSERTION performed by Michel Modi MD at [place] [address]. ARTHROSCOPY Left 9/20/2022    LEFT KNEE ARTHROSCOPIC IRRIGATION AND DEBRIDEMENT WITH DRAIN PLACEMENT performed by Daniel Augustin DO at [address] [facility]  ~2000    removed after 6 months    LEG BIOPSY EXCISION Left 3/8/2021    LEFT THIGH LESION BIOPSY EXCISION performed by Tiffanie Clark MD at [address] [facility] [address], PARTIAL Left 2008    Benign    OTHER SURGICAL HISTORY  02/1998    \"Tubes And Ovaries Removed, Appendectomy Also Done\"    OTHER SURGICAL HISTORY  Last Done 7-15-16    Mediport Insertion \"Total Of Six Done, Removed Last Mediport In 2014\"    PORT SURGERY N/A 1/15/2020    PORT REMOVAL performed by Tiffanie Clark MD at [address] [facility] [address] N/A 7/6/2020    PORT INSERTION performed by Tiffanie Clark MD at [address] [facility] [address] Left 8/3/2021    MEDIPORT REPLACEMENT performed by Tiffaine Clark MD at [address] [facility] N/A 8/16/2021    GASTRIC BYPASS RUFINO-EN-Y LAPAROSCOPIC ROBOTIC, LYSIS OF ADHESIONS performed by Tiffanie Clark MD at [address] [facility] N/A 2/12/2019    GASTRECTOMY SLEEVE LAPAROSCOPIC ROBOTIC performed by Tiffanie Clark MD at [address] [facility]  08/27/2018    UPPER GASTROINTESTINAL ENDOSCOPY N/A 4/2/2019    EGD CONTROL HEMORRHAGE with epi injection at bleeding site performed by Tiffanie Clark MD at [address] [place] [address],[facility] 6/19/2019    EGD DIAGNOSTIC ONLY performed by Tiffanie Clark MD at [address] [place] [address],[facility] N/A 7/22/2019    EGD DIAGNOSTIC ONLY performed by Ethel Andrews MD at [address] [place] [address],[facility] 10/14/2019    EGD DIAGNOSTIC ONLY performed by Tiffanie Clark MD at [address] [place] [address],[facility] N/A 7/17/2020    EGJ DILATATION BALLOON WITH 18-20 MM BALLOON, DILATED TO 20 MM. performed by Tiffanie Clark MD at [address] [place] [address],[facility] 5/28/2021    EGD BIOPSY performed by Tiffanie Clark MD at [address] [facility] [address]:   Family History   Problem Relation Age of Onset    Diabetes Mother         \"Borderline Diabetes\"    High Blood Pressure Mother     Obesity Mother     Arthritis Mother     Heart Disease Mother     High Cholesterol Mother     Vision Loss Mother     Diabetes Father     High Blood Pressure Father     Asthma Father     Cancer Father         prostate cancer    High Blood Pressure Brother     Asthma Son     Vision Loss Son     Lupus Daughter     Other Daughter         \"Alot Of Female Problems\"       SOCIAL HISTORY:   Social History     Socioeconomic History    Marital status:      Spouse name: Not on file    Number of children: Not on file    Years of education: Not on file    Highest education level: Not on file   Occupational History    Not on file   Tobacco Use    Smoking status: Never    Smokeless tobacco: Never   Vaping Use    Vaping Use: Never used   Substance and Sexual Activity    Alcohol use: Never    Drug use: Never    Sexual activity: Not Currently   Other Topics Concern    Not on file   Social History Narrative    ** Merged History Encounter **          Social Determinants of Health     Financial Resource Strain: Low Risk     Difficulty of Paying Living Expenses: Not hard at all   Food Insecurity: No Food Insecurity    Worried About Running Out of Food in the Last Year: Never true    Ran Out of Food in the Last Year: Never true   Transportation Needs: Not on file   Physical Activity: Not on file   Stress: Not on file   Social Connections: Not on file   Intimate Partner Violence: Not on file   Housing Stability: Not on file       ALLERGIES: Aspirin, Compazine [prochlorperazine], Shellfish-derived products, Toradol [ketorolac tromethamine], Haldol [haloperidol], Asa [aspirin], Compazine [prochlorperazine], Haldol [haloperidol], Reglan [metoclopramide], Reglan [metoclopramide], and Toradol [ketorolac tromethamine]    PHYSICAL EXAM:  VITAL SIGNS:   ED Triage Vitals [11/26/22 0925]   Enc Vitals Group      BP (!) 103/57      Heart Rate 56 Resp 19      Temp 99.3 °F (37.4 °C)      Temp Source Oral      SpO2 99 %      Weight 240 lb (108.9 kg)      Height 5' 4\" (1.626 m)      Head Circumference       Peak Flow       Pain Score       Pain Loc       Pain Edu? Excl. in [address]? Constitutional:  Non-toxic appearance  HENT: Normocephalic, Atraumatic, Bilateral external ears normal, Oropharynx moist, No oral exudates, Nose normal.  Eyes: PERRL, conjunctiva normal   Neck: Normal range of motion, No tenderness, Supple, No stridor,No lymphadenopathy   Cardiovascular:  Normal heart rate, Normal rhythm  Pulmonary/Chest:  Normal breath sounds, No respiratory distress, No wheezing  Abdomen: Bowel sounds normal, Soft, epigastric and right upper/mid abdominal tenderness to palpation with no guarding or rebound  Extremities: There is tenderness to palpation of the anterior and posterior left knee with a palpable effusion, no erythema or warmth, there is pain with flexion, the pedal pulses are intact, the distal extremity is pink, warm and well perfused, remaining extremities are unremarkable  Skin:  Warm, Dry, No erythema, No rash      EKG:    None    Radiology / Procedures:  Arthrocentesis Procedure Note  Indication: Joint pain, joint swelling, and to obtain joint fluid for diagnostic testing    Consent: The patient provided verbal consent for this procedure. Procedure: The left knee was positioned appropriately and the landmarks were identified. Local anesthesia was obtained by infiltration using 2% Lidocaine without epinephrine. The area was then prepped and draped in the usual sterile fashion. A needle was then introduced into the joint space at which point 20 cc of straw-colored fluid that then became bloody was aspirated. A sterile dressing was then applied to the site. The patient tolerated the procedure well.     Complications: None           CT ABDOMEN PELVIS W IV CONTRAST Additional Contrast? None (Final result)  Result time 11/28/22 09:19:08  Final result by Fern Warren MD (11/28/22 09:19:08)                Impression:    Short segment mildly dilated loop of small bowel in the left mid abdomen. This is more prominent than the prior CT. This could represent an ileus or   low-grade obstruction. 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 Again seen is mild   distension of the gastric pouch with ingested contents. Prior cholecystectomy. Again seen is pneumobilia and mild prominence of the   common bile duct. Narrative:    EXAMINATION:   CT OF THE ABDOMEN AND PELVIS WITH CONTRAST 11/26/2022 11:32 am     TECHNIQUE:   CT of the abdomen and pelvis was performed with the administration of   intravenous contrast. Multiplanar reformatted images are provided for review. Automated exposure control, iterative reconstruction, and/or weight based   adjustment of the mA/kV was utilized to reduce the radiation dose to as low   as reasonably achievable. COMPARISON:   CT 09/19/2022     HISTORY:   ORDERING SYSTEM PROVIDED HISTORY: Upper abdominal pain   TECHNOLOGIST PROVIDED HISTORY:   Reason for exam:->Upper abdominal pain   Additional Contrast?->None   Decision Support Exception - unselect if not a suspected or confirmed   emergency medical condition->Emergency Medical Condition (MA)   Reason for Exam: Upper abdominal pain     FINDINGS:   Lower Chest: Visualized lung bases demonstrate no acute abnormality. Organs: The liver, spleen, pancreas, adrenal glands, and kidneys demonstrate   no acute abnormality. Prior cholecystectomy. Again seen is pneumobilia. Similar mild prominence of the common bile duct. Nonobstructing small   intrarenal calculi. GI/Bowel: No evidence to suggest acute appendicitis. Postsurgical changes   are seen from a Jet-en-Y gastric bypass. Again seen is mild distention of   the gastric pouch with ingested contents.   Short-segment mildly dilated loop   of small bowel is seen in the left mid abdomen. Mild to moderate stool   burden is seen in the colon. Pelvis: The urinary bladder is unremarkable. Peritoneum/Retroperitoneum: No lymphadenopathy is seen. No intraperitoneal   free air or significant free fluid. Bones/Soft Tissues: No acute bony abnormality is seen. VL DUP LOWER EXTREMITY VENOUS LEFT (Final result)  Result time 11/26/22 11:19:54  Final result by Amy Caballero MD (11/26/22 11:19:54)                Impression:    Persistent complex collection in the suprapatellar region. This may relate   to prior surgery or trauma. Correlate for signs of infection or bursitis. No DVT noted             Narrative:    EXAMINATION:   NONVASCULAR ULTRASOUND OF THE LEFT EXTREMITY; VENOUS ULTRASOUND OF THE LEFT   EXTREMITY     11/26/2022 10:45 am     COMPARISON:   11/02/2022     09/19/2022     10/11/2022     HISTORY:   ORDERING SYSTEM PROVIDED HISTORY: knee pain   TECHNOLOGIST PROVIDED HISTORY:   Reason for exam:->knee pain     FINDINGS:   Focused ultrasound demonstrates a complex collection in the suprapatellar   region, measuring 9.4 x 5.8 x 8.5 cm. This has been described on prior   studies. .  It appears increased in size compared to 09/19, but decreased in   size in its greatest dimension compared to 10/11     Visualized veins are patent and free of thrombus. No DVT identified. US EXTREMITY LEFT NON VASC LIMITED (Final result)  Result time 11/26/22 11:19:54  Final result by Amy Caballero MD (11/26/22 11:19:54)                Impression:    Persistent complex collection in the suprapatellar region. This may relate   to prior surgery or trauma. Correlate for signs of infection or bursitis.      No DVT noted             Narrative:    EXAMINATION:   NONVASCULAR ULTRASOUND OF THE LEFT EXTREMITY; VENOUS ULTRASOUND OF THE LEFT   EXTREMITY     11/26/2022 10:45 am     COMPARISON:   11/02/2022     09/19/2022     10/11/2022     HISTORY: ORDERING SYSTEM PROVIDED HISTORY: knee pain   TECHNOLOGIST PROVIDED HISTORY:   Reason for exam:->knee pain     FINDINGS:   Focused ultrasound demonstrates a complex collection in the suprapatellar   region, measuring 9.4 x 5.8 x 8.5 cm. This has been described on prior   studies. .  It appears increased in size compared to 09/19, but decreased in   size in its greatest dimension compared to 10/11     Visualized veins are patent and free of thrombus. No DVT identified. XR KNEE LEFT (MIN 4 VIEWS) (Final result)  Result time 11/26/22 10:54:37  Procedure changed from XR KNEE LEFT (3 VIEWS)  Final result by To Mckenzie DO (11/26/22 10:54:37)                Impression:    Small suprapatellar bursal effusion, multi compartment moderate degenerative   arthritic change worst in the medial tibiofemoral joint space. No other   acute radiographic abnormality left knee. Narrative:    EXAMINATION:   FOUR XRAY VIEWS OF THE LEFT KNEE     11/26/2022 10:33 am     COMPARISON:   None. HISTORY:   ORDERING SYSTEM PROVIDED HISTORY: Fall, left knee pain   TECHNOLOGIST PROVIDED HISTORY:   Reason for exam:->Fall, left knee pain   Reason for Exam: Fall, left knee pain   Additional signs and symptoms: Fall, left knee pain   Relevant Medical/Surgical History: Fall, left knee pain     FINDINGS:   Small suprapatellar bursal effusion. No radiographic evidence of acute   fracture or dislocation. Multi compartment moderate degenerative arthritic   change noted worst in the medial tibiofemoral joint space. No bone   destruction, malignant-appearing periosteal reaction or other acute soft   tissue abnormality.                Labs Reviewed   CBC WITH AUTO DIFFERENTIAL - Abnormal; Notable for the following components:       Result Value    RBC 3.84 (*)     Hemoglobin 9.7 (*)     Hematocrit 31.5 (*)     MCH 25.3 (*)     MCHC 30.8 (*)     Monocytes % 8.4 (*)     Eosinophils % 7.1 (*)     All other components within normal limits   COMPREHENSIVE METABOLIC PANEL - Abnormal; Notable for the following components:    Glucose 102 (*)     AST 13 (*)     All other components within normal limits     ESR is 14  C-reactive protein is 3.9  Fluid analysis from the left knee is pending    ED COURSE & MEDICAL DECISION MAKING:  Pertinent Labs & Imaging studies reviewed. (See chart for details)  On exam, the patient is afebrile and nontoxic appearing. She is hemodynamically stable and neurologically intact. Labs are obtained and are significant for chronic anemia. There are no other clinically significant lab abnormalities. Venous Doppler of the left lower extremity is negative for DVT. There is a complex collection of fluid in the suprapatellar region. X-rays of the left knee show a small suprapatellar bursal effusion as well as multicompartment moderate degenerative arthritic change, worse in the medial tibiofemoral joint space. There is no other acute radiographic abnormality of the left knee. CT abdomen and pelvis shows a short segment mildly dilated loop of small bowel in the left mid abdomen that is more prominent than on the prior CT. This could represent an ileus or low-grade obstruction. There are postsurgical changes from a Jet-en-Y gastric bypass noted. There is mild distention of the gastric pouch with ingested contents. Patient has had a prior cholecystectomy. Again seen is pneumobilia and mild prominence of the common bile duct. The patient was treated with IV normal saline, Zofran and morphine. She was given a dose of IV Keppra as she was unable to keep down her Keppra today. She was given additional morphine as well as Phenergan and Bentyl for return pain. I spoke with Dr. Dennis Johnson who is on-call for general surgery regarding the abdominal findings. He will see the patient in consult.   I spoke with the orthopedic surgeon, Dr. Antonia Lopez, who recommended that I tapped the knee, however he is out [facility]. I spoke with Dr. Alonso Gutierrez, the orthopedic surgeon covering, who will see the patient in consult. An arthrocentesis was performed of the left knee as above. Fluid analysis studies are pending. I suspect that the patient has abdominal pain secondary to an ileus versus low-grade obstruction. The etiology of her knee pain is not entirely clear. Fluid analysis from the knee is pending. I have a low suspicion for acute appendicitis, intraabdominal abscess, perforation,  AAA, dissection, ischemic bowel, or acute surgical abdomen. I recommended admission to the hospital and the patient was agreeable. I discussed the case with with the hospitalist who will admit the patient for further treatment and care. The patient is currently in stable condition awaiting admission. Clinical Impression:  1. Epigastric pain    2. Ileus ([address]    3. Acute pain of left knee          Comment: Please note this report has been produced using speech recognition software and may contain errors related to that system including errors in grammar, punctuation, and spelling, as well as words and phrases that may be inappropriate. If there are any questions or concerns please feel free to contact the dictating provider for clarification.         Jose Enrique Lopes MD  12/02/22 [id number]

## 2022-11-27 LAB
ANION GAP SERPL CALCULATED.3IONS-SCNC: 7 MMOL/L (ref 4–16)
BUN BLDV-MCNC: 12 MG/DL (ref 6–23)
CALCIUM SERPL-MCNC: 10 MG/DL (ref 8.3–10.6)
CHLORIDE BLD-SCNC: 110 MMOL/L (ref 99–110)
CO2: 26 MMOL/L (ref 21–32)
CREAT SERPL-MCNC: 0.7 MG/DL (ref 0.6–1.1)
CRYSTALS, FLUID: NORMAL
GFR SERPL CREATININE-BSD FRML MDRD: >60 ML/MIN/1.73M2
GLUCOSE BLD-MCNC: 107 MG/DL (ref 70–99)
POTASSIUM SERPL-SCNC: 4.7 MMOL/L (ref 3.5–5.1)
SODIUM BLD-SCNC: 143 MMOL/L (ref 135–145)

## 2022-11-27 PROCEDURE — 94761 N-INVAS EAR/PLS OXIMETRY MLT: CPT

## 2022-11-27 PROCEDURE — 99221 1ST HOSP IP/OBS SF/LOW 40: CPT | Performed by: PHYSICIAN ASSISTANT

## 2022-11-27 PROCEDURE — 6370000000 HC RX 637 (ALT 250 FOR IP): Performed by: PHYSICIAN ASSISTANT

## 2022-11-27 PROCEDURE — 36591 DRAW BLOOD OFF VENOUS DEVICE: CPT

## 2022-11-27 PROCEDURE — 1200000000 HC SEMI PRIVATE

## 2022-11-27 PROCEDURE — 6360000002 HC RX W HCPCS: Performed by: INTERNAL MEDICINE

## 2022-11-27 PROCEDURE — 2580000003 HC RX 258: Performed by: NURSE PRACTITIONER

## 2022-11-27 PROCEDURE — 6370000000 HC RX 637 (ALT 250 FOR IP): Performed by: NURSE PRACTITIONER

## 2022-11-27 PROCEDURE — 6360000002 HC RX W HCPCS: Performed by: NURSE PRACTITIONER

## 2022-11-27 PROCEDURE — 80048 BASIC METABOLIC PNL TOTAL CA: CPT

## 2022-11-27 RX ORDER — MORPHINE SULFATE 2 MG/ML
2 INJECTION, SOLUTION INTRAMUSCULAR; INTRAVENOUS EVERY 4 HOURS PRN
Status: DISCONTINUED | OUTPATIENT
Start: 2022-11-27 | End: 2022-11-28

## 2022-11-27 RX ORDER — POLYETHYLENE GLYCOL 3350 17 G/17G
17 POWDER, FOR SOLUTION ORAL DAILY
Status: DISCONTINUED | OUTPATIENT
Start: 2022-11-27 | End: 2022-12-02 | Stop reason: HOSPADM

## 2022-11-27 RX ORDER — MORPHINE SULFATE 2 MG/ML
2 INJECTION, SOLUTION INTRAMUSCULAR; INTRAVENOUS EVERY 6 HOURS PRN
Status: DISCONTINUED | OUTPATIENT
Start: 2022-11-27 | End: 2022-11-27

## 2022-11-27 RX ORDER — BISACODYL 10 MG
10 SUPPOSITORY, RECTAL RECTAL DAILY PRN
Status: DISCONTINUED | OUTPATIENT
Start: 2022-11-27 | End: 2022-12-02 | Stop reason: HOSPADM

## 2022-11-27 RX ADMIN — HYDROXYZINE HYDROCHLORIDE 50 MG: 50 TABLET, FILM COATED ORAL at 09:20

## 2022-11-27 RX ADMIN — SODIUM CHLORIDE, PRESERVATIVE FREE 10 ML: 5 INJECTION INTRAVENOUS at 20:14

## 2022-11-27 RX ADMIN — BISACODYL 10 MG: 10 SUPPOSITORY RECTAL at 09:20

## 2022-11-27 RX ADMIN — MORPHINE SULFATE 2 MG: 2 INJECTION, SOLUTION INTRAMUSCULAR; INTRAVENOUS at 02:17

## 2022-11-27 RX ADMIN — OXYCODONE AND ACETAMINOPHEN 1 TABLET: 5; 325 TABLET ORAL at 05:01

## 2022-11-27 RX ADMIN — OXYCODONE AND ACETAMINOPHEN 1 TABLET: 5; 325 TABLET ORAL at 18:08

## 2022-11-27 RX ADMIN — ENOXAPARIN SODIUM 30 MG: 100 INJECTION SUBCUTANEOUS at 09:20

## 2022-11-27 RX ADMIN — GABAPENTIN 800 MG: 400 CAPSULE ORAL at 13:21

## 2022-11-27 RX ADMIN — LEVETIRACETAM 1000 MG: 500 TABLET, FILM COATED ORAL at 09:20

## 2022-11-27 RX ADMIN — OXYCODONE AND ACETAMINOPHEN 1 TABLET: 5; 325 TABLET ORAL at 12:09

## 2022-11-27 RX ADMIN — SODIUM CHLORIDE 1000 ML: 9 INJECTION, SOLUTION INTRAVENOUS at 02:22

## 2022-11-27 RX ADMIN — LEVOTHYROXINE SODIUM 125 MCG: 0.12 TABLET ORAL at 05:01

## 2022-11-27 RX ADMIN — ONDANSETRON 4 MG: 2 INJECTION INTRAMUSCULAR; INTRAVENOUS at 05:01

## 2022-11-27 RX ADMIN — POLYETHYLENE GLYCOL (3350) 17 G: 17 POWDER, FOR SOLUTION ORAL at 09:21

## 2022-11-27 RX ADMIN — GABAPENTIN 800 MG: 400 CAPSULE ORAL at 20:12

## 2022-11-27 RX ADMIN — MORPHINE SULFATE 2 MG: 2 INJECTION, SOLUTION INTRAMUSCULAR; INTRAVENOUS at 15:54

## 2022-11-27 RX ADMIN — MORPHINE SULFATE 2 MG: 2 INJECTION, SOLUTION INTRAMUSCULAR; INTRAVENOUS at 20:13

## 2022-11-27 RX ADMIN — TIZANIDINE 4 MG: 4 TABLET ORAL at 12:09

## 2022-11-27 RX ADMIN — LEVETIRACETAM 1000 MG: 500 TABLET, FILM COATED ORAL at 20:12

## 2022-11-27 RX ADMIN — MORPHINE SULFATE 2 MG: 2 INJECTION, SOLUTION INTRAMUSCULAR; INTRAVENOUS at 10:19

## 2022-11-27 RX ADMIN — PANTOPRAZOLE SODIUM 40 MG: 40 TABLET, DELAYED RELEASE ORAL at 05:00

## 2022-11-27 RX ADMIN — ONDANSETRON 4 MG: 2 INJECTION INTRAMUSCULAR; INTRAVENOUS at 20:20

## 2022-11-27 RX ADMIN — HYDROXYZINE HYDROCHLORIDE 50 MG: 50 TABLET, FILM COATED ORAL at 20:13

## 2022-11-27 RX ADMIN — ACETAMINOPHEN 650 MG: 325 TABLET ORAL at 09:20

## 2022-11-27 RX ADMIN — ENOXAPARIN SODIUM 30 MG: 100 INJECTION SUBCUTANEOUS at 20:13

## 2022-11-27 RX ADMIN — PAROXETINE HYDROCHLORIDE 40 MG: 20 TABLET, FILM COATED ORAL at 09:20

## 2022-11-27 RX ADMIN — MORPHINE SULFATE 2 MG: 2 INJECTION, SOLUTION INTRAMUSCULAR; INTRAVENOUS at 06:15

## 2022-11-27 RX ADMIN — TIZANIDINE 4 MG: 4 TABLET ORAL at 02:17

## 2022-11-27 RX ADMIN — GABAPENTIN 800 MG: 400 CAPSULE ORAL at 09:20

## 2022-11-27 RX ADMIN — ESTRADIOL 0.5 MG: 1 TABLET ORAL at 09:20

## 2022-11-27 ASSESSMENT — PAIN DESCRIPTION - DESCRIPTORS
DESCRIPTORS: ACHING;DISCOMFORT
DESCRIPTORS: ACHING
DESCRIPTORS: ACHING
DESCRIPTORS: SHARP;SHOOTING
DESCRIPTORS: CRAMPING;DISCOMFORT
DESCRIPTORS: SPASM;JABBING

## 2022-11-27 ASSESSMENT — PAIN SCALES - GENERAL
PAINLEVEL_OUTOF10: 7
PAINLEVEL_OUTOF10: 6
PAINLEVEL_OUTOF10: 7
PAINLEVEL_OUTOF10: 6
PAINLEVEL_OUTOF10: 7
PAINLEVEL_OUTOF10: 8
PAINLEVEL_OUTOF10: 6
PAINLEVEL_OUTOF10: 7

## 2022-11-27 ASSESSMENT — PAIN DESCRIPTION - ORIENTATION
ORIENTATION: LEFT;MID
ORIENTATION: RIGHT;LEFT
ORIENTATION: LEFT
ORIENTATION: LEFT
ORIENTATION: RIGHT;LEFT

## 2022-11-27 ASSESSMENT — ENCOUNTER SYMPTOMS
ABDOMINAL PAIN: 0
STRIDOR: 0
EYE ITCHING: 0
PHOTOPHOBIA: 0
EYE REDNESS: 0
RECTAL PAIN: 0
ANAL BLEEDING: 0
DIARRHEA: 1
CONSTIPATION: 1
APNEA: 0
SORE THROAT: 0
CHOKING: 0
ABDOMINAL PAIN: 1
CHEST TIGHTNESS: 0
COLOR CHANGE: 0
BACK PAIN: 0
VOMITING: 1
NAUSEA: 1

## 2022-11-27 ASSESSMENT — PAIN DESCRIPTION - LOCATION
LOCATION: ABDOMEN;LEG
LOCATION: ABDOMEN;KNEE
LOCATION: LEG;ABDOMEN
LOCATION: KNEE;ABDOMEN
LOCATION: ABDOMEN;KNEE
LOCATION: ABDOMEN;LEG
LOCATION: LEG;ABDOMEN
LOCATION: ABDOMEN;KNEE
LOCATION: ABDOMEN;LEG

## 2022-11-27 ASSESSMENT — PAIN - FUNCTIONAL ASSESSMENT
PAIN_FUNCTIONAL_ASSESSMENT: PREVENTS OR INTERFERES SOME ACTIVE ACTIVITIES AND ADLS
PAIN_FUNCTIONAL_ASSESSMENT: ACTIVITIES ARE NOT PREVENTED

## 2022-11-27 NOTE — PROGRESS NOTES
V2.0  [facility] [place]      Name:  Aidan Hurt /Age/Sex: 1970  (45 y.o. female)   MRN & CSN:  [id number] & [id number] Encounter Date/Time: 2022 8:43 AM EST    Location:  [facility]-A PCP: Toñito San MD       Hospital Day: 2    Assessment and Plan:   Aidan Hurt is a 45 y.o. female with pmh of hypothyroidism, pseudoseizure, gastric bypass, chronic pain, septic arthritis, bilateral lower extremity DVT who presents with Ileus ([facility] [address]      Plan:  Double abdominal pain  Ileus or low-grade small bowel obstruction  - Patient presented with worsening abdominal pain, nausea, vomiting with intermittent watery stools x1 week. -- CT of abdomen indicates short segment mildly dilated loop of small bowel in the left mid abdomen, could represent a ileus or low-grade obstruction.  - General surgery consulted  - N.p.o. until seen by consulting parties  - Gentle hydration  - Pain management-C. difficile rule out/C. difficile contact isolation    Left knee pain  - Recent admission for septic arthritis, in which patient discharged home with IV antibiotics that were completed 1 week ago. Patient reports being on the home IV antibiotic regimen for approximately 6 weeks. Now with persistent left knee pain and swelling.  - CRP and ESR WNL  - Orthopedic consult  - No signs and symptoms, holding on antibiotics  - Synovial joint fluid study pending    Hypothyroidism  -Continue Synthroid    Pseudoseizures  -- Continue home regimen    Chronic pain   -- Continue home regimen; patient also has morphine 2 mg ordered for 6 doses.       Diet Diet NPO   DVT Prophylaxis [x] Lovenox, []  Heparin, [] SCDs, [] Ambulation,  [] Eliquis, [] Xarelto  [] Coumadin   Code Status Full Code   Disposition From: Home  Expected Disposition: Home  Estimated Date of Discharge: 1-2 days  Patient requires continued admission due to surgery consult, and orthopedic consult pending   Surrogate Decision Maker/ POA Self Subjective:     Chief Complaint: Abdominal Pain (x1 week w/nausea ) and Knee Pain (Left knee x1 week (hx of blood clots))  Patient seen at bedside, awaiting for general surgery and orthopedic team to see. Patient reports left knee pain worsening over the past week. Patient further reports recently finished a 6-week antibiotic course for septic arthritis in left knee as well. Imaging and labs discussed in detail. Patient pain is being managed on oxycodone and morphine. Patient requesting additional pain medication, however declined Toradol as patient states she has an allergy. Patient also encouraged to ambulate in intake water to help with bowels as patient has significant stool seen on her CT of abdomen. This was also discussed in relation to increase in narcotic use, as this could be made worse with excessive use. Review of Systems:    Review of Systems   Gastrointestinal:  Positive for constipation, nausea and vomiting. Musculoskeletal:  Positive for arthralgias and joint swelling. All other systems reviewed and are negative. Objective:   No intake or output data in the 24 hours ending 11/27/22 0843     Vitals:   Vitals:    11/27/22 0615   BP:    Pulse:    Resp: 16   Temp:    SpO2:        Physical Exam:     General: NAD, well-appearing  Eyes: EOMI  ENT: neck supple  Cardiovascular: Regular rate. Respiratory: Clear to auscultation, no chest pain during exam  Gastrointestinal: Soft, non tender, no emesis or nausea during exam  Genitourinary: no suprapubic tenderness  Musculoskeletal: Left knee pain and mild edema  Skin: warm, dry  Neuro: Alert. and Oriented   Psych: Mood appropriate.      Medications:   Medications:    hydrOXYzine HCl  50 mg Oral BID    levETIRAcetam  1,000 mg Oral BID    levothyroxine  125 mcg Oral QAM AC    pantoprazole  40 mg Oral QAM AC    PARoxetine  40 mg Oral Daily    sodium chloride flush  5-40 mL IntraVENous 2 times per day    enoxaparin  30 mg SubCUTAneous BID gabapentin  800 mg Oral TID    estradiol  0.5 mg Oral Daily      Infusions:    sodium chloride      sodium chloride 1,000 mL (11/27/22 0222)     PRN Meds: heparin flush, 100 Units, PRN  albuterol, 2.5 mg, Q6H PRN  oxyCODONE-acetaminophen, 1 tablet, Q6H PRN  sodium chloride flush, 10 mL, PRN  sodium chloride, 500 mL, PRN  ondansetron, 4 mg, Q8H PRN   Or  ondansetron, 4 mg, Q6H PRN  polyethylene glycol, 17 g, Daily PRN  nicotine polacrilex, 2 mg, Q1H PRN  acetaminophen, 650 mg, Q6H PRN   Or  acetaminophen, 650 mg, Q6H PRN  tiZANidine, 4 mg, Q8H PRN  morphine, 2 mg, Q4H PRN        Labs      Recent Results (from the past 24 hour(s))   Urinalysis    Collection Time: 11/26/22  9:36 AM   Result Value Ref Range    Color, UA YELLOW YELLOW    Clarity, UA CLEAR CLEAR    Glucose, Urine NEGATIVE NEGATIVE MG/DL    Bilirubin Urine NEGATIVE NEGATIVE MG/DL    Ketones, Urine NEGATIVE NEGATIVE MG/DL    Specific Gravity, UA 1.020 1.001 - 1.035    Blood, Urine NEGATIVE NEGATIVE    pH, Urine 6.0 5.0 - 8.0    Protein, UA NEGATIVE NEGATIVE MG/DL    Urobilinogen, Urine 0.2 0.2 - 1.0 MG/DL    Nitrite Urine, Quantitative NEGATIVE NEGATIVE    Leukocyte Esterase, Urine NEGATIVE NEGATIVE   CBC with Auto Differential    Collection Time: 11/26/22 10:05 AM   Result Value Ref Range    WBC 4.5 4.0 - 10.5 K/CU MM    RBC 3.84 (L) 4.2 - 5.4 M/CU MM    Hemoglobin 9.7 (L) 12.5 - 16.0 GM/DL    Hematocrit 31.5 (L) 37 - 47 %    MCV 82.0 78 - 100 FL    MCH 25.3 (L) 27 - 31 PG    MCHC 30.8 (L) 32.0 - 36.0 %    RDW 13.2 11.7 - 14.9 %    Platelets 974 437 - 070 K/CU MM    MPV 10.8 7.5 - 11.1 FL    Differential Type AUTOMATED DIFFERENTIAL     Segs Relative 51.8 36 - 66 %    Lymphocytes % 31.8 24 - 44 %    Monocytes % 8.4 (H) 0 - 4 %    Eosinophils % 7.1 (H) 0 - 3 %    Basophils % 0.7 0 - 1 %    Segs Absolute 2.4 K/CU MM    Lymphocytes Absolute 1.4 K/CU MM    Monocytes Absolute 0.4 K/CU MM    Eosinophils Absolute 0.3 K/CU MM    Basophils Absolute 0.0 K/CU MM Nucleated RBC % 0.0 %    Total Nucleated RBC 0.0 K/CU MM    Total Immature Neutrophil 0.01 K/CU MM    Immature Neutrophil % 0.2 0 - 0.43 %   Comprehensive Metabolic Panel    Collection Time: 11/26/22 10:05 AM   Result Value Ref Range    Sodium 137 135 - 145 MMOL/L    Potassium 4.1 3.5 - 5.1 MMOL/L    Chloride 104 99 - 110 mMol/L    CO2 24 21 - 32 MMOL/L    BUN 18 6 - 23 MG/DL    Creatinine 0.7 0.6 - 1.1 MG/DL    Est, Glom Filt Rate >60 >60 mL/min/1.73m2    Glucose 102 (H) 70 - 99 MG/DL    Calcium 10.0 8.3 - 10.6 MG/DL    Albumin 3.9 3.4 - 5.0 GM/DL    Total Protein 6.5 6.4 - 8.2 GM/DL    Total Bilirubin 0.2 0.0 - 1.0 MG/DL    ALT 11 10 - 40 U/L    AST 13 (L) 15 - 37 IU/L    Alkaline Phosphatase 107 40 - 129 IU/L    Anion Gap 9 4 - 16   Lipase    Collection Time: 11/26/22 10:05 AM   Result Value Ref Range    Lipase 16 13 - 60 IU/L   C-Reactive Protein    Collection Time: 11/26/22 10:40 AM   Result Value Ref Range    CRP High Sensitivity 3.9 <5.0 mg/L   Sedimentation Rate    Collection Time: 11/26/22 10:40 AM   Result Value Ref Range    Sed Rate 14 0 - 30 MM/HR   Glucose, Body Fluid    Collection Time: 11/26/22  3:45 PM   Result Value Ref Range    Glucose, Fluid 83 MG/DL   Protein, Body Fluid    Collection Time: 11/26/22  3:45 PM   Result Value Ref Range    Protein, Fluid 3.8 GM/DL   Cell Count with Differential, Body Fluid    Collection Time: 11/26/22  3:45 PM   Result Value Ref Range    Fluid Type SYNOVIAL INDEX    RBC, Fluid 210,000 /CU MM    WBC, Fluid 630 /CU MM    Neutrophil Count, Fluid 52 %    Lymphocytes, Body Fluid 34 %    Monocyte Count, Fluid 12 %    Mesothelial, Fluid 0 /100 WBC    Other Cells, Fluid ESO 2    Basic Metabolic Panel w/ Reflex to MG    Collection Time: 11/27/22  6:19 AM   Result Value Ref Range    Sodium 143 135 - 145 MMOL/L    Potassium 4.7 3.5 - 5.1 MMOL/L    Chloride 110 99 - 110 mMol/L    CO2 26 21 - 32 MMOL/L    Anion Gap 7 4 - 16    BUN 12 6 - 23 MG/DL    Creatinine 0.7 0.6 - 1.1 MG/DL Est, Glom Filt Rate >60 >60 mL/min/1.73m2    Glucose 107 (H) 70 - 99 MG/DL    Calcium 10.0 8.3 - 10.6 MG/DL        Imaging/Diagnostics Last 24 Hours   XR KNEE LEFT (MIN 4 VIEWS)    Result Date: 11/26/2022  EXAMINATION: FOUR XRAY VIEWS OF THE LEFT KNEE 11/26/2022 10:33 am COMPARISON: None. HISTORY: ORDERING SYSTEM PROVIDED HISTORY: Fall, left knee pain TECHNOLOGIST PROVIDED HISTORY: Reason for exam:->Fall, left knee pain Reason for Exam: Fall, left knee pain Additional signs and symptoms: Fall, left knee pain Relevant Medical/Surgical History: Fall, left knee pain FINDINGS: Small suprapatellar bursal effusion. No radiographic evidence of acute fracture or dislocation. Multi compartment moderate degenerative arthritic change noted worst in the medial tibiofemoral joint space. No bone destruction, malignant-appearing periosteal reaction or other acute soft tissue abnormality. Small suprapatellar bursal effusion, multi compartment moderate degenerative arthritic change worst in the medial tibiofemoral joint space. No other acute radiographic abnormality left knee. CT ABDOMEN PELVIS W IV CONTRAST Additional Contrast? None    Result Date: 11/26/2022  EXAMINATION: CT OF THE ABDOMEN AND PELVIS WITH CONTRAST 11/26/2022 11:32 am TECHNIQUE: CT of the abdomen and pelvis was performed with the administration of intravenous contrast. Multiplanar reformatted images are provided for review. Automated exposure control, iterative reconstruction, and/or weight based adjustment of the mA/kV was utilized to reduce the radiation dose to as low as reasonably achievable.  COMPARISON: CT 09/19/2022 HISTORY: ORDERING SYSTEM PROVIDED HISTORY: Upper abdominal pain TECHNOLOGIST PROVIDED HISTORY: Reason for exam:->Upper abdominal pain Additional Contrast?->None Decision Support Exception - unselect if not a suspected or confirmed emergency medical condition->Emergency Medical Condition (MA) Reason for Exam: Upper abdominal pain FINDINGS: Lower Chest: Visualized lung bases demonstrate no acute abnormality. Organs: The liver, spleen, pancreas, adrenal glands, and kidneys demonstrate no acute abnormality. Prior cholecystectomy. Again seen is pneumobilia. Similar mild prominence of the common bile duct. Nonobstructing small intrarenal calculi. GI/Bowel: No evidence to suggest acute appendicitis. 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 Again seen is mild distention of the gastric pouch with ingested contents. Short-segment mildly dilated loop of small bowel is seen in the left mid abdomen. Mild to moderate stool burden is seen in the colon. Pelvis: The urinary bladder is unremarkable. Peritoneum/Retroperitoneum: No lymphadenopathy is seen. No intraperitoneal free air or significant free fluid. Bones/Soft Tissues: No acute bony abnormality is seen. Short segment mildly dilated loop of small bowel in the left mid abdomen. This is more prominent than the prior CT. This could represent an ileus or low-grade obstruction. 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 Again seen is mild distension of the gastric pouch with ingested contents. Prior cholecystectomy. Again seen is pneumobilia and mild prominence of the common bile duct. US EXTREMITY LEFT NON VASC LIMITED    Result Date: 11/26/2022  EXAMINATION: NONVASCULAR ULTRASOUND OF THE LEFT EXTREMITY; VENOUS ULTRASOUND OF THE LEFT EXTREMITY 11/26/2022 10:45 am COMPARISON: 11/02/2022 09/19/2022 10/11/2022 HISTORY: ORDERING SYSTEM PROVIDED HISTORY: knee pain TECHNOLOGIST PROVIDED HISTORY: Reason for exam:->knee pain FINDINGS: Focused ultrasound demonstrates a complex collection in the suprapatellar region, measuring 9.4 x 5.8 x 8.5 cm. This has been described on prior studies. .  It appears increased in size compared to 09/19, but decreased in size in its greatest dimension compared to 10/11 Visualized veins are patent and free of thrombus.   No DVT identified. Persistent complex collection in the suprapatellar region. This may relate to prior surgery or trauma. Correlate for signs of infection or bursitis. No DVT noted     VL DUP LOWER EXTREMITY VENOUS LEFT    Result Date: 11/26/2022  EXAMINATION: NONVASCULAR ULTRASOUND OF THE LEFT EXTREMITY; VENOUS ULTRASOUND OF THE LEFT EXTREMITY 11/26/2022 10:45 am COMPARISON: 11/02/2022 09/19/2022 10/11/2022 HISTORY: ORDERING SYSTEM PROVIDED HISTORY: knee pain TECHNOLOGIST PROVIDED HISTORY: Reason for exam:->knee pain FINDINGS: Focused ultrasound demonstrates a complex collection in the suprapatellar region, measuring 9.4 x 5.8 x 8.5 cm. This has been described on prior studies. .  It appears increased in size compared to 09/19, but decreased in size in its greatest dimension compared to 10/11 Visualized veins are patent and free of thrombus. No DVT identified. Persistent complex collection in the suprapatellar region. This may relate to prior surgery or trauma. Correlate for signs of infection or bursitis.  No DVT noted       Electronically signed by MICHAEL James CNP on 11/27/2022 at 8:43 AM

## 2022-11-27 NOTE — CONSULTS
Consult to Orthopedic Surgery  Consult performed by: Patrick Mata DO  Consult ordered by: Gautam Novoa MD  Reason for consult: Left knee pain  Assessment/Recommendations:     Left knee hemorrhagic effusion, no signs of septic arthritis. I discussed with her today her arthrocentesis findings. I explained to her that this confirmed mostly blood and no sign of infection on the left knee. I discussed with the patient today that their are symptoms are normal and should improve with time. Continue weight-bearing as tolerated. Continue range of motion exercises as instructed. Ice and elevate as needed. Tylenol or Motrin for pain. Follow up in office with Dr. Renetta Ventura as needed. Orthopedically she is stable for discharge. Bella Abarca is a 77-year-old female who underwent left knee arthroscopy for possible septic arthritis about 2 months ago. Since that time she has been progressed well. She is admitted on this occasion due to possible ileus as well as abdominal pain. I was then consulted for continued management of her left knee pain. She states that since her surgery she has been progressing well however she has sustained multiple falls and since that time she has been dealing with dull, aching and throbbing pain globally in her left knee. She states that she has been dealing with swelling globally in the left knee however she states that her pain is better than it was before her surgery a couple of months ago. Review of Systems   Constitutional:  Negative for activity change, chills and fever. HENT:  Negative for congestion and drooling. Eyes:  Negative for redness. Respiratory:  Negative for chest tightness. Cardiovascular:  Negative for chest pain. Gastrointestinal:  Negative for abdominal pain. Endocrine: Negative for cold intolerance and heat intolerance. Musculoskeletal:  Positive for arthralgias, gait problem, joint swelling and myalgias. Negative for back pain.    Skin: Negative for color change, pallor, rash and wound. Neurological:  Negative for weakness and numbness. Psychiatric/Behavioral:  Negative for confusion. Past Medical History:   Diagnosis Date    Acid reflux     Anemia     Anxiety     Arthritis     Hands, Back And Ankles    Arthritis     Bleeding ulcer 2014    \"I Had Ulcers In My Stomach And Colon\"/ per pt on 8/12/2019\"they said recently having some blood in my stomach- in July ( 2019)could not find where coming from \"    Bronchitis Last Episode 2014    Chronic back pain     Chronic pain     [facility]    COPD (chronic obstructive pulmonary disease) ([facility] [address]     Sees Dr. Vita Smith    Depression     Disease of blood and blood forming organ     Fibromyalgia Dx 2013    GERD (gastroesophageal reflux disease)     H/O echocardiogram 08/11/2015    EF >55%. LA to be at the upper limit of normal in size. LV hypertrophy with normal LV systolic, but abnormal diastolic function. Normal valvular structures and function. H/O echocardiogram 08/30/2018    EF 55-60%    Hiatal hernia     History of blood transfusion 09/2015 And 2018    No Reaction To Blood Transfusions Received    History of sleeve gastrectomy     [place] (hard of hearing)     Right Ear    Hx of cardiac catheterization 10/24/2010    Angiographically normal coronary arteries w/ normal LV function and wall motion. Hx of transesophageal echocardiography (PILO) for monitoring 12/02/2010    EF 55-60%. WNL.     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 name    Hypertension     Immune deficiency disorder ([facility] [address]     Kidney cysts     \"they found that I have a kidney cyst but not sure which side\" per pt on 7/15/2020    Kidney stone     Lupus ([facility] [address] Dx 2013    \"Rheumatoid Lupus\"    MDRO (multiple drug resistant organisms) resistance     4 months ago chest from mediport    Morbid obesity ([facility] [address]     MRSA bacteremia     Nausea     \"Most Of The Time\"    Osteomyelitis of fifth toe of left foot (HCC)     Pain management     Sees Dr. Lamar Antonio, Once A Month    Pancreatitis chronic Dx 2001    Pneumonia Dx 11-15    Seizures (HCC)     Shortness of breath on exertion     Shortness of breath on exertion     Sleep apnea     Has CPAP\"no longer use the cpap since lost weight\"    Staph infection Dx 11-15    Left Foot    Staph infection Dx 11-15    \"Left Foot\"    Teeth missing     Upper And Lower    Thyroid disease     Wears glasses     To Read     No current facility-administered medications on file prior to encounter. Current Outpatient Medications on File Prior to Encounter   Medication Sig Dispense Refill    estradiol (ESTRACE) 0.5 MG tablet Take 1 tablet by mouth daily 30 tablet 1    ondansetron (ZOFRAN-ODT) 4 MG disintegrating tablet Take 1 tablet by mouth 3 times daily as needed for Nausea or Vomiting 30 tablet 1    levETIRAcetam (KEPPRA) 1000 MG tablet Take 1 tablet by mouth twice daily 60 tablet 3    levothyroxine (SYNTHROID) 125 MCG tablet Take 1 tablet by mouth Daily 30 tablet 2    predniSONE (DELTASONE) 10 MG tablet 4 tablets once daily for 2 days then 3 tablets once daily for 2 days then 2 tablets once daily for 2 days then 1 tablet once daily for 2 days.  (Patient not taking: Reported on 11/26/2022) 20 tablet 0    pantoprazole (PROTONIX) 40 MG tablet Take 1 tablet by mouth twice daily 180 tablet 0    hydrOXYzine HCl (ATARAX) 50 MG tablet Take 50 mg by mouth 2 times daily      PARoxetine (PAXIL) 30 MG tablet TAKE 1 & 1/2 (ONE & ONE-HALF) TABLETS BY MOUTH NIGHTLY 135 tablet 0    ferrous sulfate (IRON 325) 325 (65 Fe) MG tablet Take 1 tablet by mouth 2 times daily (Patient not taking: Reported on 11/26/2022) 180 tablet 1 albuterol sulfate HFA (PROVENTIL;VENTOLIN;PROAIR) 108 (90 Base) MCG/ACT inhaler Inhale 2 puffs into the lungs 4 times daily 1 each 3    albuterol (PROVENTIL) (2.5 MG/3ML) 0.083% nebulizer solution Take 3 mLs by nebulization every 6 hours as needed for Wheezing 120 each 0    oxyCODONE-acetaminophen (PERCOCET) 5-325 MG per tablet TAKE 1 TABLET BY MOUTH EVERY 6 HOURS AS NEEDED FOR 28 DAYS      NARCAN 4 MG/0.1ML LIQD nasal spray USE AS DIRECTED AS NEEDED FOR SUSPECTED OPIOID OVERDOSE      Melatonin 10 MG TABS Take 10-20 mg by mouth nightly as needed      Cyanocobalamin (VITAMIN B 12 PO) Take 1 tablet by mouth daily      meclizine (ANTIVERT) 25 MG tablet Take 25 mg by mouth 3 times daily as needed for Dizziness (Patient not taking: Reported on 11/26/2022)      betamethasone valerate (VALISONE) 0.1 % ointment APPLY OINTMENT TO AFFECTED AREA TWICE DAILY TO HANDS AND ELBOWS FOR 21 DAYS      potassium gluconate 550 mg tablet Take 1 tablet by mouth daily (Patient not taking: Reported on 11/26/2022)      tiZANidine (ZANAFLEX) 4 MG tablet Take 4 mg by mouth every 8 hours as needed       Cholecalciferol (VITAMIN D3) 5000 units TABS Take 1 tablet by mouth daily (Patient not taking: Reported on 11/26/2022)      Multiple Vitamin (MVI, BARIATRIC ADVANTAGE MULTI-FORMULA, CHEW TAB) Take 1 tablet by mouth daily (Patient not taking: Reported on 11/26/2022) 30 tablet 5    Calcium Citrate-Vitamin D (CALCIUM + VIT D, BARIATRIC ADVANTAGE, CHEWABLE TABLET) Take 1 tablet by mouth 2 times daily (Patient not taking: Reported on 11/26/2022) 120 tablet 5    gabapentin (NEURONTIN) 800 MG tablet Take 800 mg by mouth 3 times daily       Allergies   Allergen Reactions    Aspirin Palpitations     \"My Heart Rate Elevates\"    Compazine [Prochlorperazine] Rash    Shellfish-Derived Products Swelling    Toradol [Ketorolac Tromethamine] Rash    Haldol [Haloperidol] Other (See Comments)     States \"shook severely and had to have [place] [Aspirin] Compazine [Prochlorperazine]     Haldol [Haloperidol]      Shaking      Reglan [Metoclopramide] Itching    Reglan [Metoclopramide]     Toradol [Ketorolac Tromethamine]      Past Surgical History:   Procedure Laterality Date    ABDOMEN SURGERY      APPENDECTOMY  02/1998    Done When Tubes And Ovaries Were Removed    APPENDECTOMY      CARDIAC CATHETERIZATION  10/24/2010    CATHETER REMOVAL N/A 7/16/2019    PORT REMOVAL performed by Donya Santa MD at [address]  08/27/1991    CHOLECYSTECTOMY      CHOLECYSTECTOMY, LAPAROSCOPIC  1990's    COLONOSCOPY  Last Done In 2000's    DENTAL SURGERY      Teeth Extracted In Past    ENDOSCOPY, COLON, DIAGNOSTIC  Last Done In 2018    FOOT DEBRIDEMENT Left 6/16/2019    FIRST METATARSAL DEBRIDEMENT INCISION AND DRAINAGE. EXCISION OF ULCER.  RESECTION OF BONE. 1ST METATARSAL POWER LAVAGE AND BONE CEMENT performed by Gene Green DPM at [address] Left 4/15/2022    LEFT FOOT ABSCESS DEBRIDEMENT INCISION AND DRAINAGE, CYST REMOVAL performed by Gene Green DPM at [facility] Left Last Done In 2016     Dr. Yves Ellsworth, \" About 6 Surgeries Done Because Of Staph Infection\"    HERNIA REPAIR      HIATAL HERNIA REPAIR N/A 2/12/2019    HERNIA HIATAL LAPAROSCOPIC ROBOTIC performed by Donya Santa MD at [address] [facility] ([facility])  10/1997    Partial Abdominal Hysterectomy    HYSTERECTOMY (CERVIX STATUS UNKNOWN)      INSERTION / REMOVAL / REPLACEMENT VENOUS ACCESS CATHETER N/A 8/15/2019    PORT INSERTION performed by Donya Santa MD at [place] [address]. ARTHROSCOPY Left 9/20/2022    LEFT KNEE ARTHROSCOPIC IRRIGATION AND DEBRIDEMENT WITH DRAIN PLACEMENT performed by Coy Patricia DO at [facility]  ~2000    removed after 6 months    LEG BIOPSY EXCISION Left 3/8/2021    LEFT THIGH LESION BIOPSY EXCISION performed by Donya Santa MD at [address] [place] [address], PARTIAL Left 2008    Benign OTHER SURGICAL HISTORY  02/1998    \"Tubes And Ovaries Removed, Appendectomy Also Done\"    OTHER SURGICAL HISTORY  Last Done 7-15-16    Mediport Insertion \"Total Of Six Done, Removed Last Mediport In 2014\"    PORT SURGERY N/A 1/15/2020    PORT REMOVAL performed by Tevin Cruz MD at [address] [facility] N/A 7/6/2020    PORT INSERTION performed by Tevin Cruz MD at [address] [facility] 8/3/2021    MEDIPORT REPLACEMENT performed by Tevin Cruz MD at [address] [facility] N/A 8/16/2021    GASTRIC BYPASS RUFINO-EN-Y LAPAROSCOPIC ROBOTIC, LYSIS OF ADHESIONS performed by Tevin Cruz MD at [facility] N/A 2/12/2019    GASTRECTOMY SLEEVE LAPAROSCOPIC ROBOTIC performed by Tevin Cruz MD at [address] [place] [address]  08/27/2018    UPPER GASTROINTESTINAL ENDOSCOPY N/A 4/2/2019    EGD CONTROL HEMORRHAGE with epi injection at bleeding site performed by Tevin Cruz MD at [address] [place] [address] 6/19/2019    EGD DIAGNOSTIC ONLY performed by Tevin Cruz MD at [address] [place] [address] N/A 7/22/2019    EGD DIAGNOSTIC ONLY performed by Eve Garcia MD at [address] [place] [address] N/A 10/14/2019    EGD DIAGNOSTIC ONLY performed by Tevin Cruz MD at [address] [place] [address] N/A 7/17/2020    EGJ DILATATION BALLOON WITH 18-20 MM BALLOON, DILATED TO 20 MM. performed by Tevin Cruz MD at [address] [place] [address] 5/28/2021    EGD BIOPSY performed by Tevin Cruz MD at [address] History     Tobacco Use    Smoking status: Never    Smokeless tobacco: Never   Vaping Use    Vaping Use: Never used   Substance Use Topics    Alcohol use: Never    Drug use: Never     Family History   Problem Relation Age of Onset    Diabetes Mother         \"Borderline Diabetes\"    High Blood Pressure Mother     Obesity Mother     Arthritis Mother     Heart Disease Mother     High Cholesterol Mother     Vision Loss Mother     Diabetes Father     High Blood Pressure Father     Asthma Father     Cancer Father         prostate cancer    High Blood Pressure Brother     Asthma Son     Vision Loss Son     Lupus Daughter     Other Daughter         \"Alot Of Female Problems\"       Physical Activity: Not on file     Right Ankle Exam   Right ankle exam is normal.    Tenderness   The patient is experiencing no tenderness. Swelling: none    Muscle Strength   The patient has normal right ankle strength. Other   Erythema: absent  Sensation: normal  Pulse: present       Left Ankle Exam   Left ankle exam is normal.    Tenderness   The patient is experiencing no tenderness. Swelling: none    Range of Motion   The patient has normal left ankle ROM. Muscle Strength   The patient has normal left ankle strength. Other   Erythema: absent  Sensation: normal  Pulse: present      Right Knee Exam   Right knee exam is normal.    Tenderness   The patient is experiencing no tenderness. Range of Motion   The patient has normal right knee ROM. Other   Erythema: absent  Sensation: normal  Pulse: present  Swelling: none  Effusion: no effusion present      Left Knee Exam     Tenderness   The patient is experiencing tenderness in the lateral joint line, medial joint line and patella. Range of Motion   Extension:  normal   Flexion:  normal     Other   Erythema: absent  Sensation: normal  Pulse: present  Swelling: mild  Effusion: effusion present    Comments:  Left knee-well-healed surgical scars, no redness, no warmth, no signs of infection. Near full range of motion with mild pain. Mild knee effusion. Intact sensation motor function all nerve distributions of the left leg.  +2 DP  Compartment soft. Morbid obesity.       LABS  Synovial of infection or bursitis. No DVT noted     VL DUP LOWER EXTREMITY VENOUS LEFT    Result Date: 11/26/2022  EXAMINATION: NONVASCULAR ULTRASOUND OF THE LEFT EXTREMITY; VENOUS ULTRASOUND OF THE LEFT EXTREMITY 11/26/2022 10:45 am COMPARISON: 11/02/2022 09/19/2022 10/11/2022 HISTORY: ORDERING SYSTEM PROVIDED HISTORY: knee pain TECHNOLOGIST PROVIDED HISTORY: Reason for exam:->knee pain FINDINGS: Focused ultrasound demonstrates a complex collection in the suprapatellar region, measuring 9.4 x 5.8 x 8.5 cm. This has been described on prior studies. .  It appears increased in size compared to 09/19, but decreased in size in its greatest dimension compared to 10/11 Visualized veins are patent and free of thrombus. No DVT identified. Persistent complex collection in the suprapatellar region. This may relate to prior surgery or trauma. Correlate for signs of infection or bursitis.  No DVT noted

## 2022-11-27 NOTE — CONSULTS
Department of GeneralSurgery   Surgical Service   Dr Yandy Tyler PA-C   Consult Note      Date of Consult: 11/27/22    Reason for Consult:  Ileus  Requesting Physician:  Dr. Ruggiero Abts:  Abd pain, nausea    History Obtained From:  patient, electronic medical record    HISTORY OF PRESENT ILLNESS:                The patient is a 45 y.o. female who presents with Abd pain and nausea. Pt began having nausea with progressive increasing abd pain approximately 1 week ago. Pt reports associated bilious vomiting intermittently. Pt reports that she is vacillating between constipation and diarrhea. Does take OTC stool softeners PRN. Last BM was yesterday at home prior to arrival to the ED. Pain is described as aching, cramping, and pressure-like. Pain level is 7/10. Pt recently on 6 week course of Abx per ID for septic knee arthritis. Reports last fever was about 4 days ago. CT was reviewed:  Impression   Short segment mildly dilated loop of small bowel in the left mid abdomen. This is more prominent than the prior CT. This could represent an ileus or   low-grade obstruction. 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 Again seen is mild   distension of the gastric pouch with ingested contents. Prior cholecystectomy. Again seen is pneumobilia and mild prominence of the   common bile duct. Also noted upon my review that the pt has significant stool burden throughout the colon and into the terminal ileum.     Past Medical History:    Past Medical History:   Diagnosis Date    Acid reflux     Anemia     Anxiety     Arthritis     Hands, Back And Ankles    Arthritis     Bleeding ulcer 2014    \"I Had Ulcers In My Stomach And Colon\"/ per pt on 8/12/2019\"they said recently having some blood in my stomach- in July ( 2019)could not find where coming from \"    Bronchitis Last Episode 2014    Chronic back pain     Chronic pain     [facility]    COPD (chronic obstructive pulmonary disease) (HCC)     Sees Dr. Aga De Jesus    Depression     Disease of blood and blood forming organ     Fibromyalgia Dx 2013    GERD (gastroesophageal reflux disease)     H/O echocardiogram 08/11/2015    EF >55%. LA to be at the upper limit of normal in size. LV hypertrophy with normal LV systolic, but abnormal diastolic function. Normal valvular structures and function. H/O echocardiogram 08/30/2018    EF 55-60%    Hiatal hernia     History of blood transfusion 09/2015 And 2018    No Reaction To Blood Transfusions Received    History of sleeve gastrectomy     [place] (hard of hearing)     Right Ear    Hx of cardiac catheterization 10/24/2010    Angiographically normal coronary arteries w/ normal LV function and wall motion. Hx of transesophageal echocardiography (PILO) for monitoring 12/02/2010    EF 55-60%. WNL.     HX OTHER MEDICAL     per old chart hx of sepsis and dx left 5th metatarsal MSSA osteomylitis- consult with Dr Tiny Rodriguez     \"very difficulty IV stick- had mediport infection in the past- then put picc line in and removed 8/7/2019 now going to put new mediport in\"( 8/15/2019)    Hypertension     follow with  ? name    Hypertension     Immune deficiency disorder ([address]     Kidney cysts     \"they found that I have a kidney cyst but not sure which side\" per pt on 7/15/2020    Kidney stone     Lupus ([address] Dx 2013    \"Rheumatoid Lupus\"    MDRO (multiple drug resistant organisms) resistance     4 months ago chest from mediport    Morbid obesity ([address]     MRSA bacteremia     Nausea     \"Most Of The Time\"    Osteomyelitis of fifth toe of left foot (HCC)     Pain management     Sees Dr. Grupo Estrella, Once A Month    Pancreatitis chronic Dx 2001    Pneumonia Dx 11-15    Seizures ([address]     Shortness of breath on exertion     Shortness of breath on exertion     Sleep apnea     Has CPAP\"no longer use the cpap since lost weight\"    Staph infection Dx 11-15    Left Foot    Staph infection Dx 11-15    \"Left Foot\"    Teeth missing     Upper And Lower    Thyroid disease     Wears glasses     To Read       Past Surgical History:    Past Surgical History:   Procedure Laterality Date    ABDOMEN SURGERY      APPENDECTOMY  02/1998    Done When Tubes And Ovaries Were Removed    APPENDECTOMY      CARDIAC CATHETERIZATION  10/24/2010    CATHETER REMOVAL N/A 7/16/2019    PORT REMOVAL performed by Lorena Branch MD at [facility]  08/27/1991    CHOLECYSTECTOMY      CHOLECYSTECTOMY, LAPAROSCOPIC  1990's    COLONOSCOPY  Last Done In 2000's    DENTAL SURGERY      Teeth Extracted In Past    ENDOSCOPY, COLON, DIAGNOSTIC  Last Done In 2018    FOOT DEBRIDEMENT Left 6/16/2019    FIRST METATARSAL DEBRIDEMENT INCISION AND DRAINAGE. EXCISION OF ULCER.  RESECTION OF BONE. 1ST METATARSAL POWER LAVAGE AND BONE CEMENT performed by Sagrario Saunders DPM at [address] Left 4/15/2022    LEFT FOOT ABSCESS DEBRIDEMENT INCISION AND DRAINAGE, CYST REMOVAL performed by Sagrario Saunders DPM at [facility] Last Done In 2016     Dr. Jose Israel, \" About 6 Surgeries Done Because Of Staph Infection\"    HERNIA REPAIR      HIATAL HERNIA REPAIR N/A 2/12/2019    HERNIA HIATAL LAPAROSCOPIC ROBOTIC performed by Lorena Branch MD at [id number] [place] [address] ([facility])  10/1997    Partial Abdominal Hysterectomy    HYSTERECTOMY (CERVIX STATUS UNKNOWN)      INSERTION / REMOVAL / REPLACEMENT VENOUS ACCESS CATHETER N/A 8/15/2019    PORT INSERTION performed by Lorena Branch MD at [place] [address]. ARTHROSCOPY Left 9/20/2022    LEFT KNEE ARTHROSCOPIC IRRIGATION AND DEBRIDEMENT WITH DRAIN PLACEMENT performed by Nadine Pitts DO at [address] [facility]  ~2000    removed after 6 months    LEG BIOPSY EXCISION Left 3/8/2021    LEFT THIGH LESION BIOPSY EXCISION performed by Lorena Branhc MD at [address] [place] [address], PARTIAL Left 2008    Benign    OTHER SURGICAL HISTORY 02/1998    \"Tubes And Ovaries Removed, Appendectomy Also Done\"    OTHER SURGICAL HISTORY  Last Done 7-15-16    Mediport Insertion \"Total Of Six Done, Removed Last Mediport In 2014\"    PORT SURGERY N/A 1/15/2020    PORT REMOVAL performed by Suzanne Feliz MD at [address] N/A 7/6/2020    PORT INSERTION performed by Suzanne Feliz MD at [facility] Left 8/3/2021    MEDIPORT [facility]PL[facility] N/A 8/16/2021    GASTRIC BYPASS RUFINO-EN-Y LAPAROSCOPIC ROBOTIC, LYSIS OF ADHESIONS performed by Suzanne Feliz MD at [facility] N/A 2/12/2019    GASTRECTOMY SLEEVE LAPAROSCOPIC ROBOTIC performed by Suzanne Feliz MD at [address] [place] [address]  08/27/2018    UPPER GASTROINTESTINAL ENDOSCOPY N/A 4/2/2019    EGD CONTROL HEMORRHAGE with epi injection at bleeding site performed by Suzanne Feliz MD at [address] [place] [address],[facility] 6/19/2019    EGD DIAGNOSTIC ONLY performed by Suzanne Feliz MD at [address] [place] [address],[facility] N/A 7/22/2019    EGD DIAGNOSTIC ONLY performed by Jacque Hayward MD at [address] [place] [address],[facility] N/A 10/14/2019    EGD DIAGNOSTIC ONLY performed by Suzanne Feliz MD at [address] [place] [address],[facility] N/A 7/17/2020    EGJ DILATATION BALLOON WITH 18-20 MM BALLOON, DILATED TO 20 MM. performed by Suzanne Feliz MD at [address] [place] [address],[facility] 5/28/2021    EGD BIOPSY performed by Suzanne Feliz MD at [facility]         Current Medications:   Current Facility-Administered Medications   Medication Dose Route Frequency Provider Last Rate Last Admin    heparin flush 100 UNIT/ML injection 100 Units  100 Units IntraCATHeter PRN Christiana Perdue MD        albuterol (PROVENTIL) nebulizer solution 2.5 mg  2.5 mg Nebulization Q6H PRN Patrick Balls, APRN - CNP        hydrOXYzine HCl (ATARAX) tablet 50 mg  50 mg Oral BID Versie Balls, APRN - CNP   50 mg at 11/26/22 2055    levETIRAcetam (KEPPRA) tablet 1,000 mg  1,000 mg Oral BID Versie Balls, APRN - CNP   1,000 mg at 11/26/22 2055    levothyroxine (SYNTHROID) tablet 125 mcg  125 mcg Oral QAM AC Versie Balls, APRN - CNP   125 mcg at 11/27/22 0501    pantoprazole (PROTONIX) tablet 40 mg  40 mg Oral QAM AC Versie Balls, APRN - CNP   40 mg at 11/27/22 0500    PARoxetine (PAXIL) tablet 40 mg  40 mg Oral Daily Patrick Balls, APRN - CNP        oxyCODONE-acetaminophen (PERCOCET) 5-325 MG per tablet 1 tablet  1 tablet Oral Q6H PRN Tarynie Balls, APRN - CNP   1 tablet at 11/27/22 0501    sodium chloride flush 0.9 % injection 5-40 mL  5-40 mL IntraVENous 2 times per day Patrick Balls, APRN - CNP        sodium chloride flush 0.9 % injection 10 mL  10 mL IntraVENous PRN Tarynie Balls, APRN - CNP        0.9 % sodium chloride infusion  500 mL IntraVENous PRN Patrick Balls, APRN - CNP        enoxaparin Sodium (LOVENOX) injection 30 mg  30 mg SubCUTAneous BID Versie Balls, APRN - CNP   30 mg at 11/26/22 2055    ondansetron (ZOFRAN-ODT) disintegrating tablet 4 mg  4 mg Oral Q8H PRN Patrick Balls, APRN - CNP        Or    ondansetron (ZOFRAN) injection 4 mg  4 mg IntraVENous Q6H PRN Patrick Balls, APRN - CNP   4 mg at 11/27/22 0501    polyethylene glycol (GLYCOLAX) packet 17 g  17 g Oral Daily PRN Patrick Balls, APRN - CNP        nicotine polacrilex (COMMIT) lozenge 2 mg  2 mg Oral Q1H PRN Patrick Balls, APRN - CNP        acetaminophen (TYLENOL) tablet 650 mg  650 mg Oral Q6H PRN Versie Balls, APRN - CNP        Or    acetaminophen (TYLENOL) suppository 650 mg  650 mg Rectal Q6H PRN MICHAEL Pastor - CNP        0.9 % sodium chloride infusion  1,000 mL IntraVENous Continuous MICHAEL Pastor -  mL/hr at 11/27/22 0222 1,000 mL at 11/27/22 0222    gabapentin (NEURONTIN) capsule 800 mg  800 mg Oral TID Patrick Fritz APRN - CNP   800 mg at 11/26/22 2054 tiZANidine (ZANAFLEX) tablet 4 mg  4 mg Oral Q8H PRN [facility], APRN - CNP   4 mg at 11/27/22 0217    estradiol (ESTRACE) tablet 0.5 mg  0.5 mg Oral Daily [facility], APRN - CNP        morphine (PF) injection 2 mg  2 mg IntraVENous Q4H PRN [facility], APRN - CNP   2 mg at 11/27/22 3497       Allergies:  Aspirin, Compazine [prochlorperazine], Shellfish-derived products, Toradol [ketorolac tromethamine], Haldol [haloperidol], Asa [aspirin], Compazine [prochlorperazine], Haldol [haloperidol], Reglan [metoclopramide], Reglan [metoclopramide], and Toradol [ketorolac tromethamine]    Social History:   Social History     Socioeconomic History    Marital status:      Spouse name: None    Number of children: None    Years of education: None    Highest education level: None   Tobacco Use    Smoking status: Never    Smokeless tobacco: Never   Vaping Use    Vaping Use: Never used   Substance and Sexual Activity    Alcohol use: Never    Drug use: Never    Sexual activity: Not Currently   Social History Narrative    ** Merged History Encounter **          Social Determinants of Health     Financial Resource Strain: Low Risk     Difficulty of Paying Living Expenses: Not hard at all   Food Insecurity: No Food Insecurity    Worried About Running Out of Food in the Last Year: Never true    Ran Out of Food in the Last Year: Never true       Family History:   Family History   Problem Relation Age of Onset    Diabetes Mother         \"Borderline Diabetes\"    High Blood Pressure Mother     Obesity Mother     Arthritis Mother     Heart Disease Mother     High Cholesterol Mother     Vision Loss Mother     Diabetes Father     High Blood Pressure Father     Asthma Father     Cancer Father         prostate cancer    High Blood Pressure Brother     Asthma Son     Vision Loss Son     Lupus Daughter     Other Daughter         \"Alot Of Female Problems\"       REVIEW OFSYSTEMS:    Review of Systems   Constitutional:  Negative for chills and fever. HENT:  Negative for ear pain, mouth sores, sore throat and tinnitus. Eyes:  Negative for photophobia, redness and itching. Respiratory:  Negative for apnea, choking and stridor. Cardiovascular:  Negative for chest pain and palpitations. Gastrointestinal:  Positive for abdominal pain, constipation (vacillating), diarrhea (vacillating), nausea and vomiting (intermittent). Negative for anal bleeding and rectal pain. Endocrine: Negative for polydipsia. Genitourinary:  Negative for enuresis, flank pain and hematuria. Musculoskeletal:  Negative for back pain, joint swelling and myalgias. Skin:  Negative for color change and pallor. Allergic/Immunologic: Negative for environmental allergies. Neurological:  Negative for syncope and speech difficulty. Psychiatric/Behavioral:  Negative for confusion and hallucinations. PHYSICAL EXAM:  Vitals:    11/27/22 0437 11/27/22 0501 11/27/22 0531 11/27/22 0615   BP: 106/67      Pulse: 53      Resp: 16 16 20 16   Temp: 97.7 °F (36.5 °C)      TempSrc: Oral      SpO2: 100%      Weight: 245 lb 13 oz (111.5 kg)      Height:           Physical Exam  Constitutional:       Appearance: She is well-developed. She is obese. HENT:      Head: Normocephalic. Eyes:      Pupils: Pupils are equal, round, and reactive to light. Cardiovascular:      Rate and Rhythm: Normal rate. Pulmonary:      Effort: Pulmonary effort is normal.   Abdominal:      General: There is no distension. Palpations: Abdomen is soft. There is no mass. Tenderness: There is abdominal tenderness in the right upper quadrant. There is no guarding or rebound. Musculoskeletal:         General: Normal range of motion. Cervical back: Normal range of motion and neck supple. Skin:     General: Skin is warm. Neurological:      Mental Status: She is alert and oriented to person, place, and time.          DATA:    CBC:   Lab Results   Component Value Date/Time    WBC 4.5 11/26/2022 10:05 AM    RBC 3.84 11/26/2022 10:05 AM    HGB 9.7 11/26/2022 10:05 AM    HCT 31.5 11/26/2022 10:05 AM    MCV 82.0 11/26/2022 10:05 AM    MCH 25.3 11/26/2022 10:05 AM    MCHC 30.8 11/26/2022 10:05 AM    RDW 13.2 11/26/2022 10:05 AM     11/26/2022 10:05 AM    MPV 10.8 11/26/2022 10:05 AM     CMP:    Lab Results   Component Value Date/Time     11/27/2022 06:19 AM    K 4.7 11/27/2022 06:19 AM     11/27/2022 06:19 AM    CO2 26 11/27/2022 06:19 AM    BUN 12 11/27/2022 06:19 AM    CREATININE 0.7 11/27/2022 06:19 AM    GFRAA >60 10/11/2022 06:00 PM    LABGLOM >60 11/27/2022 06:19 AM    GLUCOSE 107 11/27/2022 06:19 AM    PROT 6.5 11/26/2022 10:05 AM    PROT 6.5 11/18/2011 03:45 AM    LABALBU 3.9 11/26/2022 10:05 AM    LABALBU 8 11/18/2015 05:00 PM    CALCIUM 10.0 11/27/2022 06:19 AM    BILITOT 0.2 11/26/2022 10:05 AM    ALKPHOS 107 11/26/2022 10:05 AM    AST 13 11/26/2022 10:05 AM    ALT 11 11/26/2022 10:05 AM       IMPRESSION:        Patient Active Problem List:     Fever     Fibromyalgia     Lupus (systemic lupus erythematosus) (HCC)     Chronic pancreatitis (HCC)     HTN (hypertension)     Frequent UTI     Gastroesophageal reflux disease without esophagitis     Chronic depression     Fatty liver disease, nonalcoholic     Arthritis     Bilateral low back pain with left-sided sciatica     Hiatal hernia     Chronic pain syndrome     Drug-seeking/Aberrant behavior     Receiving intravenous antibiotic treatment as outpatient     Lymph node disorder     Morbid obesity with BMI of 40.0-44.9, adult ([address]     Iron deficiency anemia     History of Jet-en-Y gastric bypass     Anxiety     RUQ pain     Discoloration of skin of flank resembling ecchymosis     Chest pain of uncertain etiology     Cellulitis of left thigh     Malabsorption     COPD (chronic obstructive pulmonary disease) (HCC)     Nausea     Hypothyroidism due to acquired atrophy of thyroid     Vitamin D deficiency     Irritable bowel syndrome Malabsorption due to intolerance, not elsewhere classified     Port-A-Cath in place     Pseudoseizures [facility])     Diarrhea     Myalgia     Acute bilateral deep vein thrombosis (DVT) of femoral veins (HCC)     Acute deep vein thrombosis (DVT) of popliteal vein of left lower extremity (HCC)     Swelling of joint of left knee     Septic arthritis of knee, left (HCC)     Hemarthrosis of left knee     Ileus (HCC)      PLAN:    Pt with large stool burden. Start miralax daily with dulcolax suppositories PRN. Leonel Cox for sips of clears - advised to go slow. Ambulate, up to chair for meals. Patient and plan of care discussed with Dr. Petra Busby.     Alana Gordon PA-C

## 2022-11-28 LAB
ADENOVIRUS F 40 41 PCR: NOT DETECTED
ANION GAP SERPL CALCULATED.3IONS-SCNC: 7 MMOL/L (ref 4–16)
ASTROVIRUS PCR: NOT DETECTED
BASOPHILS ABSOLUTE: 0 K/CU MM
BASOPHILS RELATIVE PERCENT: 0.9 % (ref 0–1)
BUN BLDV-MCNC: 11 MG/DL (ref 6–23)
C-REACTIVE PROTEIN, HIGH SENSITIVITY: 9.2 MG/L
CALCIUM SERPL-MCNC: 9.3 MG/DL (ref 8.3–10.6)
CAMPYLOBACTER PCR: NOT DETECTED
CHLORIDE BLD-SCNC: 110 MMOL/L (ref 99–110)
CO2: 25 MMOL/L (ref 21–32)
CREAT SERPL-MCNC: 0.6 MG/DL (ref 0.6–1.1)
CRYPTOSPORIDIUM PCR: NOT DETECTED
CYCLOSPORA CAYETANENSIS PCR: NOT DETECTED
DIFFERENTIAL TYPE: ABNORMAL
E COLI 0157 PCR: NOT DETECTED
E COLI ENTEROAGGREGATIVE PCR: NOT DETECTED
E COLI ENTEROPATHOGENIC PCR: NOT DETECTED
E COLI ENTEROTOXIGENIC PCR: NOT DETECTED
E COLI SHIGA LIKE TOXIN PCR: NOT DETECTED
E COLI SHIGELLA/ENTEROINVASIVE PCR: NOT DETECTED
ENTAMOEBA HISTOLYTICA PCR: NOT DETECTED
EOSINOPHILS ABSOLUTE: 0.3 K/CU MM
EOSINOPHILS RELATIVE PERCENT: 7.5 % (ref 0–3)
GFR SERPL CREATININE-BSD FRML MDRD: >60 ML/MIN/1.73M2
GIARDIA LAMBLIA PCR: NOT DETECTED
GLUCOSE BLD-MCNC: 86 MG/DL (ref 70–99)
HCT VFR BLD CALC: 28.8 % (ref 37–47)
HEMOGLOBIN: 8.7 GM/DL (ref 12.5–16)
IMMATURE NEUTROPHIL %: 0.3 % (ref 0–0.43)
LYMPHOCYTES ABSOLUTE: 1.4 K/CU MM
LYMPHOCYTES RELATIVE PERCENT: 40.6 % (ref 24–44)
MCH RBC QN AUTO: 25.1 PG (ref 27–31)
MCHC RBC AUTO-ENTMCNC: 30.2 % (ref 32–36)
MCV RBC AUTO: 83.2 FL (ref 78–100)
MONOCYTES ABSOLUTE: 0.3 K/CU MM
MONOCYTES RELATIVE PERCENT: 9 % (ref 0–4)
NOROVIRUS GI GII PCR: NOT DETECTED
NUCLEATED RBC %: 0 %
PDW BLD-RTO: 13.2 % (ref 11.7–14.9)
PLATELET # BLD: 145 K/CU MM (ref 140–440)
PLESIOMONAS SHIGELLOIDES PCR: NOT DETECTED
PMV BLD AUTO: 10.6 FL (ref 7.5–11.1)
POTASSIUM SERPL-SCNC: 4.5 MMOL/L (ref 3.5–5.1)
PROCALCITONIN: 0.02
RBC # BLD: 3.46 M/CU MM (ref 4.2–5.4)
ROTAVIRUS A PCR: NOT DETECTED
SALMONELLA PCR: NOT DETECTED
SAPOVIRUS PCR: NOT DETECTED
SEGMENTED NEUTROPHILS ABSOLUTE COUNT: 1.4 K/CU MM
SEGMENTED NEUTROPHILS RELATIVE PERCENT: 41.7 % (ref 36–66)
SODIUM BLD-SCNC: 142 MMOL/L (ref 135–145)
TOTAL IMMATURE NEUTOROPHIL: 0.01 K/CU MM
TOTAL NUCLEATED RBC: 0 K/CU MM
VIBRIO CHOLERAE PCR: NOT DETECTED
VIBRIO PCR: NOT DETECTED
WBC # BLD: 3.4 K/CU MM (ref 4–10.5)
YERSINIA ENTEROCOLITICA PCR: NOT DETECTED

## 2022-11-28 PROCEDURE — 85025 COMPLETE CBC W/AUTO DIFF WBC: CPT

## 2022-11-28 PROCEDURE — 80048 BASIC METABOLIC PNL TOTAL CA: CPT

## 2022-11-28 PROCEDURE — 6370000000 HC RX 637 (ALT 250 FOR IP): Performed by: NURSE PRACTITIONER

## 2022-11-28 PROCEDURE — 36415 COLL VENOUS BLD VENIPUNCTURE: CPT

## 2022-11-28 PROCEDURE — 86140 C-REACTIVE PROTEIN: CPT

## 2022-11-28 PROCEDURE — 1200000000 HC SEMI PRIVATE

## 2022-11-28 PROCEDURE — 99232 SBSQ HOSP IP/OBS MODERATE 35: CPT | Performed by: SURGERY

## 2022-11-28 PROCEDURE — 6360000002 HC RX W HCPCS: Performed by: NURSE PRACTITIONER

## 2022-11-28 PROCEDURE — 36591 DRAW BLOOD OFF VENOUS DEVICE: CPT

## 2022-11-28 PROCEDURE — 6360000002 HC RX W HCPCS: Performed by: INTERNAL MEDICINE

## 2022-11-28 PROCEDURE — 87507 IADNA-DNA/RNA PROBE TQ 12-25: CPT

## 2022-11-28 PROCEDURE — 2580000003 HC RX 258: Performed by: NURSE PRACTITIONER

## 2022-11-28 PROCEDURE — 84145 PROCALCITONIN (PCT): CPT

## 2022-11-28 PROCEDURE — 83605 ASSAY OF LACTIC ACID: CPT

## 2022-11-28 PROCEDURE — 6370000000 HC RX 637 (ALT 250 FOR IP): Performed by: PHYSICIAN ASSISTANT

## 2022-11-28 RX ORDER — PROMETHAZINE HYDROCHLORIDE 25 MG/ML
6.25 INJECTION, SOLUTION INTRAMUSCULAR; INTRAVENOUS EVERY 6 HOURS PRN
Status: DISCONTINUED | OUTPATIENT
Start: 2022-11-28 | End: 2022-12-01

## 2022-11-28 RX ADMIN — SODIUM CHLORIDE 1000 ML: 9 INJECTION, SOLUTION INTRAVENOUS at 20:42

## 2022-11-28 RX ADMIN — POLYETHYLENE GLYCOL (3350) 17 G: 17 POWDER, FOR SOLUTION ORAL at 09:23

## 2022-11-28 RX ADMIN — LEVOTHYROXINE SODIUM 125 MCG: 0.12 TABLET ORAL at 06:21

## 2022-11-28 RX ADMIN — ENOXAPARIN SODIUM 30 MG: 100 INJECTION SUBCUTANEOUS at 09:27

## 2022-11-28 RX ADMIN — PROMETHAZINE HYDROCHLORIDE 6.25 MG: 25 INJECTION INTRAMUSCULAR; INTRAVENOUS at 17:55

## 2022-11-28 RX ADMIN — SODIUM CHLORIDE, PRESERVATIVE FREE 10 ML: 5 INJECTION INTRAVENOUS at 09:35

## 2022-11-28 RX ADMIN — TIZANIDINE 4 MG: 4 TABLET ORAL at 20:40

## 2022-11-28 RX ADMIN — PAROXETINE HYDROCHLORIDE 40 MG: 20 TABLET, FILM COATED ORAL at 09:27

## 2022-11-28 RX ADMIN — GABAPENTIN 800 MG: 400 CAPSULE ORAL at 20:40

## 2022-11-28 RX ADMIN — OXYCODONE AND ACETAMINOPHEN 1 TABLET: 5; 325 TABLET ORAL at 20:39

## 2022-11-28 RX ADMIN — HYDROMORPHONE HYDROCHLORIDE 0.5 MG: 1 INJECTION, SOLUTION INTRAMUSCULAR; INTRAVENOUS; SUBCUTANEOUS at 11:41

## 2022-11-28 RX ADMIN — ONDANSETRON 4 MG: 2 INJECTION INTRAMUSCULAR; INTRAVENOUS at 09:36

## 2022-11-28 RX ADMIN — ENOXAPARIN SODIUM 30 MG: 100 INJECTION SUBCUTANEOUS at 20:40

## 2022-11-28 RX ADMIN — PROMETHAZINE HYDROCHLORIDE 6.25 MG: 25 INJECTION INTRAMUSCULAR; INTRAVENOUS at 11:42

## 2022-11-28 RX ADMIN — HYDROXYZINE HYDROCHLORIDE 50 MG: 50 TABLET, FILM COATED ORAL at 09:24

## 2022-11-28 RX ADMIN — ESTRADIOL 0.5 MG: 1 TABLET ORAL at 09:25

## 2022-11-28 RX ADMIN — ONDANSETRON 4 MG: 2 INJECTION INTRAMUSCULAR; INTRAVENOUS at 22:33

## 2022-11-28 RX ADMIN — MORPHINE SULFATE 2 MG: 2 INJECTION, SOLUTION INTRAMUSCULAR; INTRAVENOUS at 01:27

## 2022-11-28 RX ADMIN — SODIUM CHLORIDE 1000 ML: 9 INJECTION, SOLUTION INTRAVENOUS at 01:28

## 2022-11-28 RX ADMIN — OXYCODONE AND ACETAMINOPHEN 1 TABLET: 5; 325 TABLET ORAL at 03:15

## 2022-11-28 RX ADMIN — ONDANSETRON 4 MG: 2 INJECTION INTRAMUSCULAR; INTRAVENOUS at 03:15

## 2022-11-28 RX ADMIN — MORPHINE SULFATE 2 MG: 2 INJECTION, SOLUTION INTRAMUSCULAR; INTRAVENOUS at 06:02

## 2022-11-28 RX ADMIN — HYDROMORPHONE HYDROCHLORIDE 0.5 MG: 1 INJECTION, SOLUTION INTRAMUSCULAR; INTRAVENOUS; SUBCUTANEOUS at 17:56

## 2022-11-28 RX ADMIN — LEVETIRACETAM 1000 MG: 500 TABLET, FILM COATED ORAL at 09:23

## 2022-11-28 RX ADMIN — LEVETIRACETAM 1000 MG: 500 TABLET, FILM COATED ORAL at 20:41

## 2022-11-28 RX ADMIN — PANTOPRAZOLE SODIUM 40 MG: 40 TABLET, DELAYED RELEASE ORAL at 06:02

## 2022-11-28 RX ADMIN — ONDANSETRON 4 MG: 2 INJECTION INTRAMUSCULAR; INTRAVENOUS at 15:43

## 2022-11-28 RX ADMIN — HYDROXYZINE HYDROCHLORIDE 50 MG: 50 TABLET, FILM COATED ORAL at 20:40

## 2022-11-28 RX ADMIN — GABAPENTIN 800 MG: 400 CAPSULE ORAL at 15:54

## 2022-11-28 RX ADMIN — GABAPENTIN 800 MG: 400 CAPSULE ORAL at 09:27

## 2022-11-28 RX ADMIN — OXYCODONE AND ACETAMINOPHEN 1 TABLET: 5; 325 TABLET ORAL at 15:43

## 2022-11-28 RX ADMIN — SODIUM CHLORIDE 1000 ML: 9 INJECTION, SOLUTION INTRAVENOUS at 11:40

## 2022-11-28 ASSESSMENT — PAIN SCALES - GENERAL
PAINLEVEL_OUTOF10: 6
PAINLEVEL_OUTOF10: 7
PAINLEVEL_OUTOF10: 7
PAINLEVEL_OUTOF10: 9
PAINLEVEL_OUTOF10: 7
PAINLEVEL_OUTOF10: 8
PAINLEVEL_OUTOF10: 6
PAINLEVEL_OUTOF10: 7

## 2022-11-28 ASSESSMENT — ENCOUNTER SYMPTOMS
RESPIRATORY NEGATIVE: 1
COLOR CHANGE: 0
ABDOMINAL PAIN: 0
EYES NEGATIVE: 1
ALLERGIC/IMMUNOLOGIC NEGATIVE: 1
NAUSEA: 1
BACK PAIN: 1

## 2022-11-28 ASSESSMENT — PAIN DESCRIPTION - DESCRIPTORS
DESCRIPTORS: ACHING
DESCRIPTORS: ACHING
DESCRIPTORS: THROBBING
DESCRIPTORS: THROBBING
DESCRIPTORS: ACHING

## 2022-11-28 ASSESSMENT — PAIN DESCRIPTION - ORIENTATION
ORIENTATION: LEFT;MID;RIGHT
ORIENTATION: RIGHT
ORIENTATION: RIGHT;MID;LEFT
ORIENTATION: LEFT
ORIENTATION: LEFT

## 2022-11-28 ASSESSMENT — PAIN DESCRIPTION - LOCATION
LOCATION: KNEE;ABDOMEN
LOCATION: ABDOMEN;KNEE
LOCATION: ABDOMEN;KNEE
LOCATION: ABDOMEN
LOCATION: ABDOMEN;LEG

## 2022-11-28 NOTE — PROGRESS NOTES
V2.0  [facility] Hospitalist Progress Note      Name:  Sam Perales /Age/Sex: 1970  (45 y.o. female)   MRN & CSN:  [id number] & [id number] Encounter Date/Time: 2022 9:45 AM EST    Location:  -A PCP: Shawn Grove MD       Hospital Day: 3    Assessment and Plan:   Sam Perales is a 45 y.o. female with pmh of Hypothyroidism, S/P Gastric Bypass, Pseudoseizure, Anxiety, COPD, Chronic Pain, Septic Arthritis Left Knee, Bilat LE DVT who presents with Ileus [facility])      This patient was discussed with Dr. Samanta Skinner. He agreeable with the plan and management as dictated above. Plan:    Abdominal pain:   Ileus or low-grade small bowel obstruction  - Patient presented with worsening abdominal pain, nausea, vomiting with intermittent watery stools x1 week, but had large soft stool  afternoon. -- CT of abdomen indicates short segment mildly dilated loop of small bowel in the left mid abdomen, could represent a ileus or low-grade obstruction.  - General surgery consulted, following   - Diet increased from Clears to full liquids, now  to soft foods   - Gentle hydration-  ml/hr  - Pain management- DC'd IV morphine, Percocet 5/325 PO Q6 hours, which is home regimen, added Dilaudid IV for poor pain management  --did have a large soft BM  afternoon   -CRP increased today to 9.2, WBCs down today 3.4, tachy at 98, will follow closely  -adding GI Panel for watery diarrhea x 1 week     Left knee pain  - Recent admission for septic arthritis, in which patient discharged home with IV antibiotics that were completed 1 week ago. Patient reports being on the home IV antibiotic regimen for approximately 6 weeks.   Now with persistent left knee pain and swelling.  - CRP and ESR WNL , on  Lactate pending, Procal normal and CRP - 9.2   - Orthopedic consult- they have checked off on her    - No signs and symptoms, holding on antibiotics  - Synovial joint fluid study pending  -consulting ID- Dr. Unique Javier, due to worsening left knee pain/swelling     Hypothyroidism  -Continue Synthroid     Pseudoseizures  -- Continue home regimen     Chronic pain   -- Continue home regimen; patient also has morphine 2 mg ordered for 6 doses. Diet ADULT DIET; Full Liquid   DVT Prophylaxis [x] Lovenox, []  Heparin, [] SCDs, [] Ambulation,  [] Eliquis, [] Xarelto  [] Coumadin   Code Status Full Code   Disposition From: Home  Expected Disposition: 1-3 days  Estimated Date of Discharge: TBD  Patient requires continued admission due to Surgery Consult for Small Bowel Obstr, advancing diet slowly    Surrogate Decision Maker/ POA Self     Subjective:     Chief Complaint: Abdominal Pain (x1 week w/nausea ) and Knee Pain (Left knee x1 week (hx of blood clots))       Tianna Rodriguez is a 45 y.o. female who presents with Abd Pain, Nausea & Vomiting and Watery stools x 1 week. Did just complete a long oral antibiotic regimen with Dr. Unique Javier for her left septic knee. Does have some nausea today 11/28 and had some minimal vomiting early this am of yellow fluid. Will try and advance her diet today to soft foods from full liquids. Does still complain of mild/moderate upper abd pain and nausea with occasional vomiting. Denies fevers, chills, body aches, sob, chest pain or tightness. Does mention her left knee to be more painful and swollen and is followed outpatient by ID. Just completed a 6 week course of ceftriaxone on 11/15/22. Review of Systems:    Ten point ROS is negative, unless otherwise noted above. Objective: Intake/Output Summary (Last 24 hours) at 11/28/2022 0945  Last data filed at 11/28/2022 0317  Gross per 24 hour   Intake 120 ml   Output 1065 ml   Net -945 ml        Vitals:   Vitals:    11/28/22 0837   BP: (!) 94/59   Pulse: 96   Resp: 18   Temp: 97.8 °F (36.6 °C)   SpO2: 100%       Physical Exam:     General: NAD  Eyes: EOMI  ENT: neck supple  Cardiovascular: Regular rate. Normal S1/S2, no murmurs or gallops  Respiratory: Clear to auscultation bilaterally, normal oxygenation on RA  Gastrointestinal: Soft, mild/mod tenderness in right upper quad and epigastric region, normal BS x 4  Genitourinary: no suprapubic tenderness  Musculoskeletal: mild left knee edema  Skin: warm, dry  Neuro: Alert and oriented x 3  Psych: Mood appropriate.      Medications:   Medications:    polyethylene glycol  17 g Oral Daily    hydrOXYzine HCl  50 mg Oral BID    levETIRAcetam  1,000 mg Oral BID    levothyroxine  125 mcg Oral QAM AC    pantoprazole  40 mg Oral QAM AC    PARoxetine  40 mg Oral Daily    sodium chloride flush  5-40 mL IntraVENous 2 times per day    enoxaparin  30 mg SubCUTAneous BID    gabapentin  800 mg Oral TID    estradiol  0.5 mg Oral Daily      Infusions:    sodium chloride      sodium chloride 1,000 mL (11/28/22 0128)     PRN Meds: bisacodyl, 10 mg, Daily PRN  heparin flush, 100 Units, PRN  albuterol, 2.5 mg, Q6H PRN  oxyCODONE-acetaminophen, 1 tablet, Q6H PRN  sodium chloride flush, 10 mL, PRN  sodium chloride, 500 mL, PRN  ondansetron, 4 mg, Q8H PRN   Or  ondansetron, 4 mg, Q6H PRN  nicotine polacrilex, 2 mg, Q1H PRN  acetaminophen, 650 mg, Q6H PRN   Or  acetaminophen, 650 mg, Q6H PRN  tiZANidine, 4 mg, Q8H PRN        Labs      Recent Results (from the past 24 hour(s))   Basic Metabolic Panel w/ Reflex to MG    Collection Time: 11/28/22  2:53 AM   Result Value Ref Range    Sodium 142 135 - 145 MMOL/L    Potassium 4.5 3.5 - 5.1 MMOL/L    Chloride 110 99 - 110 mMol/L    CO2 25 21 - 32 MMOL/L    Anion Gap 7 4 - 16    BUN 11 6 - 23 MG/DL    Creatinine 0.6 0.6 - 1.1 MG/DL    Est, Glom Filt Rate >60 >60 mL/min/1.73m2    Glucose 86 70 - 99 MG/DL    Calcium 9.3 8.3 - 10.6 MG/DL   CBC with Auto Differential    Collection Time: 11/28/22  2:53 AM   Result Value Ref Range    WBC 3.4 (L) 4.0 - 10.5 K/CU MM    RBC 3.46 (L) 4.2 - 5.4 M/CU MM    Hemoglobin 8.7 (L) 12.5 - 16.0 GM/DL    Hematocrit 28.8 (L) 37 - 47 %    MCV 83.2 78 - 100 FL    MCH 25.1 (L) 27 - 31 PG    MCHC 30.2 (L) 32.0 - 36.0 %    RDW 13.2 11.7 - 14.9 %    Platelets 336 337 - 031 K/CU MM    MPV 10.6 7.5 - 11.1 FL    Differential Type AUTOMATED DIFFERENTIAL     Segs Relative 41.7 36 - 66 %    Lymphocytes % 40.6 24 - 44 %    Monocytes % 9.0 (H) 0 - 4 %    Eosinophils % 7.5 (H) 0 - 3 %    Basophils % 0.9 0 - 1 %    Segs Absolute 1.4 K/CU MM    Lymphocytes Absolute 1.4 K/CU MM    Monocytes Absolute 0.3 K/CU MM    Eosinophils Absolute 0.3 K/CU MM    Basophils Absolute 0.0 K/CU MM    Nucleated RBC % 0.0 %    Total Nucleated RBC 0.0 K/CU MM    Total Immature Neutrophil 0.01 K/CU MM    Immature Neutrophil % 0.3 0 - 0.43 %        Imaging/Diagnostics Last 24 Hours   XR KNEE LEFT (MIN 4 VIEWS)    Result Date: 11/26/2022  EXAMINATION: FOUR XRAY VIEWS OF THE LEFT KNEE 11/26/2022 10:33 am COMPARISON: None. HISTORY: ORDERING SYSTEM PROVIDED HISTORY: Fall, left knee pain TECHNOLOGIST PROVIDED HISTORY: Reason for exam:->Fall, left knee pain Reason for Exam: Fall, left knee pain Additional signs and symptoms: Fall, left knee pain Relevant Medical/Surgical History: Fall, left knee pain FINDINGS: Small suprapatellar bursal effusion. No radiographic evidence of acute fracture or dislocation. Multi compartment moderate degenerative arthritic change noted worst in the medial tibiofemoral joint space. No bone destruction, malignant-appearing periosteal reaction or other acute soft tissue abnormality. Small suprapatellar bursal effusion, multi compartment moderate degenerative arthritic change worst in the medial tibiofemoral joint space. No other acute radiographic abnormality left knee.      CT ABDOMEN PELVIS W IV CONTRAST Additional Contrast? None    Result Date: 11/28/2022  EXAMINATION: CT OF THE ABDOMEN AND PELVIS WITH CONTRAST 11/26/2022 11:32 am TECHNIQUE: CT of the abdomen and pelvis was performed with the administration of intravenous contrast. Multiplanar reformatted images are provided for review. Automated exposure control, iterative reconstruction, and/or weight based adjustment of the mA/kV was utilized to reduce the radiation dose to as low as reasonably achievable. COMPARISON: CT 09/19/2022 HISTORY: ORDERING SYSTEM PROVIDED HISTORY: Upper abdominal pain TECHNOLOGIST PROVIDED HISTORY: Reason for exam:->Upper abdominal pain Additional Contrast?->None Decision Support Exception - unselect if not a suspected or confirmed emergency medical condition->Emergency Medical Condition (MA) Reason for Exam: Upper abdominal pain FINDINGS: Lower Chest: Visualized lung bases demonstrate no acute abnormality. Organs: The liver, spleen, pancreas, adrenal glands, and kidneys demonstrate no acute abnormality. Prior cholecystectomy. Again seen is pneumobilia. Similar mild prominence of the common bile duct. Nonobstructing small intrarenal calculi. GI/Bowel: No evidence to suggest acute appendicitis. 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 Again seen is mild distention of the gastric pouch with ingested contents. Short-segment mildly dilated loop of small bowel is seen in the left mid abdomen. Mild to moderate stool burden is seen in the colon. Pelvis: The urinary bladder is unremarkable. Peritoneum/Retroperitoneum: No lymphadenopathy is seen. No intraperitoneal free air or significant free fluid. Bones/Soft Tissues: No acute bony abnormality is seen. Short segment mildly dilated loop of small bowel in the left mid abdomen. This is more prominent than the prior CT. This could represent an ileus or low-grade obstruction. 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 Again seen is mild distension of the gastric pouch with ingested contents. Prior cholecystectomy. Again seen is pneumobilia and mild prominence of the common bile duct.      US EXTREMITY LEFT NON VASC LIMITED    Result Date: 11/26/2022  EXAMINATION: NONVASCULAR

## 2022-11-28 NOTE — PLAN OF CARE
Problem: ABCDS Injury Assessment  Goal: Absence of physical injury  11/28/2022 1201 by Guerita Lam LPN  Outcome: Progressing  11/28/2022 0123 by Rico Melendrez RN  Outcome: Progressing     Problem: Discharge Planning  Goal: Discharge to home or other facility with appropriate resources  11/28/2022 1201 by Guerita Lam LPN  Outcome: Progressing  11/28/2022 0123 by Rico Melendrez RN  Outcome: Progressing     Problem: Pain  Goal: Verbalizes/displays adequate comfort level or baseline comfort level  11/28/2022 1201 by Guerita Lam LPN  Outcome: Progressing  11/28/2022 0123 by Rico Melendrez RN  Outcome: Progressing

## 2022-11-28 NOTE — PROGRESS NOTES
General Surgery-Dr. Larry Dickey Day: 3    Chief Complaint on Admission: possible ileus      Subjective: Tolerated some fulls. Had multiple soft BM per pt. Pass flatus. +OOB. Main complaint is left knee pain. Denies F/C. Did report some emesis overnight. Wants to possibly try diet being advanced. ROS:  Review of Systems   HENT: Negative. Eyes: Negative. Respiratory: Negative. Cardiovascular: Negative. Gastrointestinal:  Positive for nausea. Negative for abdominal pain. Endocrine: Negative. Genitourinary: Negative. Musculoskeletal:  Positive for arthralgias, back pain and joint swelling. Skin:  Negative for color change. Allergic/Immunologic: Negative. Hematological: Negative. All other systems reviewed and are negative. Allergies  Aspirin, Compazine [prochlorperazine], Shellfish-derived products, Toradol [ketorolac tromethamine], Haldol [haloperidol], Asa [aspirin], Compazine [prochlorperazine], Haldol [haloperidol], Reglan [metoclopramide], Reglan [metoclopramide], and Toradol [ketorolac tromethamine]          Diagnosis Date    Acid reflux     Anemia     Anxiety     Arthritis     Hands, Back And Ankles    Arthritis     Bleeding ulcer 2014    \"I Had Ulcers In My Stomach And Colon\"/ per pt on 8/12/2019\"they said recently having some blood in my stomach- in July ( 2019)could not find where coming from \"    Bronchitis Last Episode 2014    Chronic back pain     Chronic pain     [facility]    [facility]     Sees Dr. Bello     Depression     Disease of blood and blood forming organ     Fibromyalgia Dx 2013    GERD (gastroesophageal reflux disease)     H/O echocardiogram 08/11/2015    EF >55%. LA to be at the upper limit of normal in size. LV hypertrophy with normal LV systolic, but abnormal diastolic function. Normal valvular structures and function.      H/O echocardiogram 08/30/2018    EF 55-60%    Hiatal hernia     History of blood transfusion 2015 And     No Reaction To Blood Transfusions Received    History of sleeve gastrectomy     [place] (hard of hearing)     Right Ear    Hx of cardiac catheterization 10/24/2010    Angiographically normal coronary arteries w/ normal LV function and wall motion. Hx of transesophageal echocardiography (PILO) for monitoring 2010    EF 55-60%. WNL. 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 name    Hypertension     Immune deficiency disorder ([address]     Kidney cysts     \"they found that I have a kidney cyst but not sure which side\" per pt on 7/15/2020    Kidney stone     Lupus ([address] Dx     \"Rheumatoid Lupus\"    MDRO (multiple drug resistant organisms) resistance     4 months ago chest from mediport    Morbid obesity ([address]     MRSA bacteremia     Nausea     \"Most Of The Time\"    Osteomyelitis of fifth toe of left foot (HCC)     Pain management     Sees Dr. Leighton Reilly, Once A Month    Pancreatitis chronic Dx     Pneumonia Dx -15    Seizures ([address]     Shortness of breath on exertion     Shortness of breath on exertion     Sleep apnea     Has CPAP\"no longer use the cpap since lost weight\"    Staph infection Dx -15    Left Foot    Staph infection Dx -15    \"Left Foot\"    Teeth missing     Upper And Lower    Thyroid disease     Wears glasses     To Read       Objective:     Vitals:    22 0837   BP: (!) 94/59   Pulse: 96   Resp: 18   Temp: 97.8 °F (36.6 °C)   SpO2: 100%       TEMPERATURE:  Current -Temp: 97.8 °F (36.6 °C); Max - Temp  Av.9 °F (36.6 °C)  Min: 97.5 °F (36.4 °C)  Max: 98.4 °F (36.9 °C)    No intake/output data recorded. I/O last 3 completed shifts: In: 120 [P.O.:120]  Out: 1065 [Urine:1000; Emesis/NG output:65]      Physical Exam:  Physical Exam  Vitals reviewed. Constitutional:       General: She is not in acute distress. Appearance: She is obese. Comments: Sitting in chair     HENT:      Head: Normocephalic and atraumatic. Right Ear: External ear normal.      Left Ear: External ear normal.   Eyes:      General:         Right eye: No discharge. Left eye: No discharge. Extraocular Movements: Extraocular movements intact. Cardiovascular:      Rate and Rhythm: Normal rate. Pulmonary:      Effort: No respiratory distress. Abdominal:      Palpations: Abdomen is soft. Tenderness: There is no abdominal tenderness. There is no guarding or rebound. Musculoskeletal:         General: No swelling. Skin:     General: Skin is warm. Neurological:      General: No focal deficit present. Mental Status: She is alert.    Psychiatric:         Mood and Affect: Mood normal.         Scheduled Meds:   polyethylene glycol  17 g Oral Daily    hydrOXYzine HCl  50 mg Oral BID    levETIRAcetam  1,000 mg Oral BID    levothyroxine  125 mcg Oral QAM AC    pantoprazole  40 mg Oral QAM AC    PARoxetine  40 mg Oral Daily    sodium chloride flush  5-40 mL IntraVENous 2 times per day    enoxaparin  30 mg SubCUTAneous BID    gabapentin  800 mg Oral TID    estradiol  0.5 mg Oral Daily     ContinuousInfusions:   sodium chloride      sodium chloride 1,000 mL (11/28/22 0128)     PRN Meds:promethazine, HYDROmorphone, bisacodyl, heparin flush, albuterol, oxyCODONE-acetaminophen, sodium chloride flush, sodium chloride, ondansetron **OR** ondansetron, nicotine polacrilex, acetaminophen **OR** acetaminophen, tiZANidine      Labs/Imaging Results:   Lab Results   Component Value Date    WBC 3.4 (L) 11/28/2022    HGB 8.7 (L) 11/28/2022    HCT 28.8 (L) 11/28/2022    MCV 83.2 11/28/2022     11/28/2022     Lab Results   Component Value Date    NA 142 11/28/2022    K 4.5 11/28/2022     11/28/2022    CO2 25 11/28/2022    BUN 11 11/28/2022    CREATININE 0.6 11/28/2022    GLUCOSE 86 11/28/2022    CALCIUM 9.3 11/28/2022    PROT 6.5 11/26/2022    LABALBU 3.9 11/26/2022    BILITOT 0.2 11/26/2022    ALKPHOS 107 11/26/2022    AST 13 (L) 11/26/2022    ALT 11 11/26/2022    LABGLOM >60 11/28/2022    GFRAA >60 10/11/2022       Assessment:       44 y/o F with L knee pain and concern for possible ileus, resolving    Plan:       -Abdominal symptoms improving. Diet advanced to soft regular.   -Abdominal exam is benign.   -Main complaint at this time related to back pain and knee pain. F/u with Ortho recs. -Will follow. No acute intervention planned from Gen Surg standpoint at this time.          Electronically signed by Machelle Obando II, MD on 11/28/2022 at 11:04 AM

## 2022-11-28 NOTE — PROGRESS NOTES
Health Maintenance Due   Topic Date Due   • Shingles Vaccine (1 of 2) 11/16/1977   • Medicare Wellness 65+  06/04/2019   • Depression Screening  06/04/2019   • Influenza Vaccine (1) 09/01/2019     Patient is due for topics as listed above but is not proceeding with Immunization(s) Shingles at this time.    Patient states that she will receive her Influenza vaccine at the assisted living she is at.    Over the last 2 weeks, how often have you been bothered by the following problems?          PHQ2 Score:  0  1. Little interest or pleasure in doing things?:  0  2. Feeling down, depressed, or hopeless?:  0            This nurse was informed during shift change that pt had emesis after eating dinner twice, pt requesting zofran, night nurse at bedside and will medicate per order.

## 2022-11-29 PROBLEM — M25.062 HEMARTHROSIS INVOLVING KNEE JOINT, LEFT: Status: ACTIVE | Noted: 2022-11-29

## 2022-11-29 PROBLEM — M25.562 ACUTE PAIN OF LEFT KNEE: Status: ACTIVE | Noted: 2022-11-29

## 2022-11-29 PROBLEM — R10.13 EPIGASTRIC PAIN: Status: ACTIVE | Noted: 2022-11-29

## 2022-11-29 LAB
ANION GAP SERPL CALCULATED.3IONS-SCNC: 9 MMOL/L (ref 4–16)
BASOPHILS ABSOLUTE: 0 K/CU MM
BASOPHILS ABSOLUTE: 0 K/CU MM
BASOPHILS RELATIVE PERCENT: 0.6 % (ref 0–1)
BASOPHILS RELATIVE PERCENT: 0.7 % (ref 0–1)
BUN BLDV-MCNC: 8 MG/DL (ref 6–23)
C-REACTIVE PROTEIN, HIGH SENSITIVITY: 7.6 MG/L
CALCIUM SERPL-MCNC: 9.5 MG/DL (ref 8.3–10.6)
CHLORIDE BLD-SCNC: 107 MMOL/L (ref 99–110)
CO2: 23 MMOL/L (ref 21–32)
CREAT SERPL-MCNC: 0.7 MG/DL (ref 0.6–1.1)
DIFFERENTIAL TYPE: ABNORMAL
DIFFERENTIAL TYPE: ABNORMAL
EOSINOPHILS ABSOLUTE: 0.2 K/CU MM
EOSINOPHILS ABSOLUTE: 0.3 K/CU MM
EOSINOPHILS RELATIVE PERCENT: 7 % (ref 0–3)
EOSINOPHILS RELATIVE PERCENT: 7.5 % (ref 0–3)
ERYTHROCYTE SEDIMENTATION RATE: 16 MM/HR (ref 0–30)
FERRITIN: 32 NG/ML (ref 15–150)
FOLATE: 10.4 NG/ML (ref 3.1–17.5)
GFR SERPL CREATININE-BSD FRML MDRD: >60 ML/MIN/1.73M2
GLUCOSE BLD-MCNC: 103 MG/DL (ref 70–99)
HCT VFR BLD CALC: 28 % (ref 37–47)
HCT VFR BLD CALC: 29.5 % (ref 37–47)
HEMOCCULT STL QL: NEGATIVE
HEMOGLOBIN: 8.4 GM/DL (ref 12.5–16)
HEMOGLOBIN: 8.8 GM/DL (ref 12.5–16)
IMMATURE NEUTROPHIL %: 0.3 % (ref 0–0.43)
IMMATURE NEUTROPHIL %: 0.3 % (ref 0–0.43)
IRON: 25 UG/DL (ref 37–145)
LACTATE: 0.9 MMOL/L (ref 0.4–2)
LYMPHOCYTES ABSOLUTE: 1.2 K/CU MM
LYMPHOCYTES ABSOLUTE: 1.2 K/CU MM
LYMPHOCYTES RELATIVE PERCENT: 34.8 % (ref 24–44)
LYMPHOCYTES RELATIVE PERCENT: 38.9 % (ref 24–44)
MCH RBC QN AUTO: 24.9 PG (ref 27–31)
MCH RBC QN AUTO: 25.1 PG (ref 27–31)
MCHC RBC AUTO-ENTMCNC: 29.8 % (ref 32–36)
MCHC RBC AUTO-ENTMCNC: 30 % (ref 32–36)
MCV RBC AUTO: 83.6 FL (ref 78–100)
MCV RBC AUTO: 83.8 FL (ref 78–100)
MONOCYTES ABSOLUTE: 0.2 K/CU MM
MONOCYTES ABSOLUTE: 0.3 K/CU MM
MONOCYTES RELATIVE PERCENT: 7 % (ref 0–4)
MONOCYTES RELATIVE PERCENT: 8.4 % (ref 0–4)
NUCLEATED RBC %: 0 %
NUCLEATED RBC %: 0 %
PCT TRANSFERRIN: 9 % (ref 10–44)
PDW BLD-RTO: 13.3 % (ref 11.7–14.9)
PDW BLD-RTO: 13.4 % (ref 11.7–14.9)
PLATELET # BLD: 137 K/CU MM (ref 140–440)
PLATELET # BLD: 157 K/CU MM (ref 140–440)
PMV BLD AUTO: 10.2 FL (ref 7.5–11.1)
PMV BLD AUTO: 10.8 FL (ref 7.5–11.1)
POTASSIUM SERPL-SCNC: 4.2 MMOL/L (ref 3.5–5.1)
RBC # BLD: 3.34 M/CU MM (ref 4.2–5.4)
RBC # BLD: 3.53 M/CU MM (ref 4.2–5.4)
REASON FOR REJECTION: NORMAL
REJECTED TEST: NORMAL
RETICULOCYTE COUNT PCT: 1 % (ref 0.2–2.2)
SEGMENTED NEUTROPHILS ABSOLUTE COUNT: 1.4 K/CU MM
SEGMENTED NEUTROPHILS ABSOLUTE COUNT: 1.7 K/CU MM
SEGMENTED NEUTROPHILS RELATIVE PERCENT: 46.1 % (ref 36–66)
SEGMENTED NEUTROPHILS RELATIVE PERCENT: 48.4 % (ref 36–66)
SODIUM BLD-SCNC: 139 MMOL/L (ref 135–145)
TOTAL IMMATURE NEUTOROPHIL: 0.01 K/CU MM
TOTAL IMMATURE NEUTOROPHIL: 0.01 K/CU MM
TOTAL IRON BINDING CAPACITY: 267 UG/DL (ref 250–450)
TOTAL NUCLEATED RBC: 0 K/CU MM
TOTAL NUCLEATED RBC: 0 K/CU MM
UNSATURATED IRON BINDING CAPACITY: 242 UG/DL (ref 110–370)
VITAMIN B-12: 1869 PG/ML (ref 211–911)
WBC # BLD: 3 K/CU MM (ref 4–10.5)
WBC # BLD: 3.5 K/CU MM (ref 4–10.5)

## 2022-11-29 PROCEDURE — 86140 C-REACTIVE PROTEIN: CPT

## 2022-11-29 PROCEDURE — 6360000002 HC RX W HCPCS: Performed by: NURSE PRACTITIONER

## 2022-11-29 PROCEDURE — 99232 SBSQ HOSP IP/OBS MODERATE 35: CPT | Performed by: SURGERY

## 2022-11-29 PROCEDURE — 6370000000 HC RX 637 (ALT 250 FOR IP): Performed by: NURSE PRACTITIONER

## 2022-11-29 PROCEDURE — 83550 IRON BINDING TEST: CPT

## 2022-11-29 PROCEDURE — 6360000002 HC RX W HCPCS: Performed by: INTERNAL MEDICINE

## 2022-11-29 PROCEDURE — 82272 OCCULT BLD FECES 1-3 TESTS: CPT

## 2022-11-29 PROCEDURE — 2580000003 HC RX 258: Performed by: NURSE PRACTITIONER

## 2022-11-29 PROCEDURE — 85652 RBC SED RATE AUTOMATED: CPT

## 2022-11-29 PROCEDURE — 1200000000 HC SEMI PRIVATE

## 2022-11-29 PROCEDURE — 85025 COMPLETE CBC W/AUTO DIFF WBC: CPT

## 2022-11-29 PROCEDURE — 83605 ASSAY OF LACTIC ACID: CPT

## 2022-11-29 PROCEDURE — 36591 DRAW BLOOD OFF VENOUS DEVICE: CPT

## 2022-11-29 PROCEDURE — 94761 N-INVAS EAR/PLS OXIMETRY MLT: CPT

## 2022-11-29 PROCEDURE — 83540 ASSAY OF IRON: CPT

## 2022-11-29 PROCEDURE — 99223 1ST HOSP IP/OBS HIGH 75: CPT | Performed by: INTERNAL MEDICINE

## 2022-11-29 PROCEDURE — 85045 AUTOMATED RETICULOCYTE COUNT: CPT

## 2022-11-29 PROCEDURE — 82728 ASSAY OF FERRITIN: CPT

## 2022-11-29 PROCEDURE — 80048 BASIC METABOLIC PNL TOTAL CA: CPT

## 2022-11-29 PROCEDURE — 82607 VITAMIN B-12: CPT

## 2022-11-29 PROCEDURE — 82746 ASSAY OF FOLIC ACID SERUM: CPT

## 2022-11-29 RX ORDER — MORPHINE SULFATE 2 MG/ML
2 INJECTION, SOLUTION INTRAMUSCULAR; INTRAVENOUS EVERY 8 HOURS PRN
Status: DISCONTINUED | OUTPATIENT
Start: 2022-11-29 | End: 2022-11-30

## 2022-11-29 RX ORDER — MORPHINE SULFATE 2 MG/ML
2 INJECTION, SOLUTION INTRAMUSCULAR; INTRAVENOUS EVERY 6 HOURS PRN
Status: DISCONTINUED | OUTPATIENT
Start: 2022-11-29 | End: 2022-11-29

## 2022-11-29 RX ORDER — POLYVINYL ALCOHOL 14 MG/ML
1 SOLUTION/ DROPS OPHTHALMIC
Status: DISCONTINUED | OUTPATIENT
Start: 2022-11-29 | End: 2022-12-02 | Stop reason: HOSPADM

## 2022-11-29 RX ADMIN — OXYCODONE AND ACETAMINOPHEN 1 TABLET: 5; 325 TABLET ORAL at 17:48

## 2022-11-29 RX ADMIN — ONDANSETRON 4 MG: 2 INJECTION INTRAMUSCULAR; INTRAVENOUS at 04:56

## 2022-11-29 RX ADMIN — LEVETIRACETAM 1000 MG: 500 TABLET, FILM COATED ORAL at 21:57

## 2022-11-29 RX ADMIN — LEVETIRACETAM 1000 MG: 500 TABLET, FILM COATED ORAL at 08:17

## 2022-11-29 RX ADMIN — GABAPENTIN 800 MG: 400 CAPSULE ORAL at 15:37

## 2022-11-29 RX ADMIN — PAROXETINE HYDROCHLORIDE 40 MG: 20 TABLET, FILM COATED ORAL at 08:18

## 2022-11-29 RX ADMIN — ENOXAPARIN SODIUM 30 MG: 100 INJECTION SUBCUTANEOUS at 08:17

## 2022-11-29 RX ADMIN — IRON SUCROSE 100 MG: 20 INJECTION, SOLUTION INTRAVENOUS at 21:56

## 2022-11-29 RX ADMIN — ENOXAPARIN SODIUM 30 MG: 100 INJECTION SUBCUTANEOUS at 21:57

## 2022-11-29 RX ADMIN — HYDROXYZINE HYDROCHLORIDE 50 MG: 50 TABLET, FILM COATED ORAL at 14:02

## 2022-11-29 RX ADMIN — SODIUM CHLORIDE, PRESERVATIVE FREE 10 ML: 5 INJECTION INTRAVENOUS at 21:58

## 2022-11-29 RX ADMIN — PROMETHAZINE HYDROCHLORIDE 6.25 MG: 25 INJECTION INTRAMUSCULAR; INTRAVENOUS at 14:02

## 2022-11-29 RX ADMIN — OXYCODONE AND ACETAMINOPHEN 1 TABLET: 5; 325 TABLET ORAL at 03:26

## 2022-11-29 RX ADMIN — PROMETHAZINE HYDROCHLORIDE 6.25 MG: 25 INJECTION INTRAMUSCULAR; INTRAVENOUS at 22:28

## 2022-11-29 RX ADMIN — TIZANIDINE 4 MG: 4 TABLET ORAL at 22:28

## 2022-11-29 RX ADMIN — OXYCODONE AND ACETAMINOPHEN 1 TABLET: 5; 325 TABLET ORAL at 08:31

## 2022-11-29 RX ADMIN — ESTRADIOL 0.5 MG: 1 TABLET ORAL at 08:17

## 2022-11-29 RX ADMIN — PANTOPRAZOLE SODIUM 40 MG: 40 TABLET, DELAYED RELEASE ORAL at 06:28

## 2022-11-29 RX ADMIN — ONDANSETRON 4 MG: 2 INJECTION INTRAMUSCULAR; INTRAVENOUS at 17:48

## 2022-11-29 RX ADMIN — MORPHINE SULFATE 2 MG: 2 INJECTION, SOLUTION INTRAMUSCULAR; INTRAVENOUS at 14:03

## 2022-11-29 RX ADMIN — GABAPENTIN 800 MG: 400 CAPSULE ORAL at 21:57

## 2022-11-29 RX ADMIN — HYDROXYZINE HYDROCHLORIDE 50 MG: 50 TABLET, FILM COATED ORAL at 22:28

## 2022-11-29 RX ADMIN — HYDROMORPHONE HYDROCHLORIDE 0.5 MG: 1 INJECTION, SOLUTION INTRAMUSCULAR; INTRAVENOUS; SUBCUTANEOUS at 06:46

## 2022-11-29 RX ADMIN — PROMETHAZINE HYDROCHLORIDE 6.25 MG: 25 INJECTION INTRAMUSCULAR; INTRAVENOUS at 01:09

## 2022-11-29 RX ADMIN — TIZANIDINE 4 MG: 4 TABLET ORAL at 04:57

## 2022-11-29 RX ADMIN — LEVOTHYROXINE SODIUM 125 MCG: 0.12 TABLET ORAL at 06:28

## 2022-11-29 RX ADMIN — MORPHINE SULFATE 2 MG: 2 INJECTION, SOLUTION INTRAMUSCULAR; INTRAVENOUS at 21:56

## 2022-11-29 RX ADMIN — HYDROMORPHONE HYDROCHLORIDE 0.5 MG: 1 INJECTION, SOLUTION INTRAMUSCULAR; INTRAVENOUS; SUBCUTANEOUS at 00:03

## 2022-11-29 RX ADMIN — GABAPENTIN 800 MG: 400 CAPSULE ORAL at 08:17

## 2022-11-29 ASSESSMENT — PAIN SCALES - GENERAL
PAINLEVEL_OUTOF10: 7
PAINLEVEL_OUTOF10: 6
PAINLEVEL_OUTOF10: 8
PAINLEVEL_OUTOF10: 6
PAINLEVEL_OUTOF10: 8
PAINLEVEL_OUTOF10: 6

## 2022-11-29 ASSESSMENT — PAIN - FUNCTIONAL ASSESSMENT: PAIN_FUNCTIONAL_ASSESSMENT: ACTIVITIES ARE NOT PREVENTED

## 2022-11-29 ASSESSMENT — PAIN DESCRIPTION - LOCATION
LOCATION: ABDOMEN;LEG
LOCATION: ABDOMEN
LOCATION: ABDOMEN;LEG
LOCATION: ABDOMEN

## 2022-11-29 ASSESSMENT — PAIN DESCRIPTION - DESCRIPTORS
DESCRIPTORS: ACHING
DESCRIPTORS: ACHING;SHARP

## 2022-11-29 ASSESSMENT — ENCOUNTER SYMPTOMS
ABDOMINAL PAIN: 0
EYES NEGATIVE: 1
NAUSEA: 0
ALLERGIC/IMMUNOLOGIC NEGATIVE: 1
COLOR CHANGE: 0
BACK PAIN: 1
RESPIRATORY NEGATIVE: 1

## 2022-11-29 ASSESSMENT — PAIN DESCRIPTION - ORIENTATION
ORIENTATION: MID
ORIENTATION: LEFT

## 2022-11-29 NOTE — PROGRESS NOTES
V2.0  [facility] [place]      Name:  Lorena Peterson /Age/Sex: 1970  (45 y.o. female)   MRN & CSN:  [id number] & [id number] Encounter Date/Time: 2022 7:54 AM EST    Location:  [address] [place] [address] PCP: Taylor Gallagher MD       Hospital Day: 4    Assessment and Plan:   Lorena Peterson is a 45 y.o. female with pmh of Morbid Obesity s/p Gastric Bypass w/ history of lysis of adhesions 21,  Peudoseizure, Anxiety, COPD, Chronic Pain, Hypothyroidism, Septic Arthritis Left Knee, Bilat LE DVT, GI Bleeds, Acute Pancreatitis, s/p multiple ERCP's for Bile Duct Injury in [place], placement of biliary/pancreatic stents now removed,  who presents with Ileus [facility])      This patient was discussed with Dr. Saida Marie. He was agreeable with the plan and management as dictated above. Plan:    Abdominal pain:   Ileus or low-grade small bowel obstruction  - Patient presented with worsening abdominal pain, nausea, vomiting with intermittent watery stools x1 week, but had large soft stool  afternoon.   -- CT of abdomen indicates short segment mildly dilated loop of small bowel in the left mid abdomen, could represent a ileus or low-grade obstruction.  - General surgery consulted- advanced her to Regular Diet  - Diet increased from Clears to full liquids, now  to soft foods   - Gentle hydration-  ml/hr  - Pain management - Percocet 5/325 Q6 prn, stopped Dilaudid today, patients pain worse so added IV morphine 2 mg Q8 hours prn   --did have a large soft BM  afternoon,  evening and 3 am on   -CRP increased to 9.2 , 7.6   -: lactate 0.9, Sed Rate 16  -adding GI Panel for watery diarrhea x 1 week- NEG; C Diff cancelled per Inf Control as she would would need to be off all laxatives for 24 hours   -consulted GI- Dr. Brown Adrian saw her  and checked off on her   - US RUQ- pending     Left knee pain:   - Recent admission for septic arthritis, in which patient discharged home with IV antibiotics that were completed 1 week ago. Patient reports being on the home IV antibiotic regimen for approximately 6 weeks. Now with persistent left knee pain and swelling.  - CRP and ESR WNL 11/27, on 11/28 Lactate pending, Procal normal and CRP - 9.2   - Orthopedic consult- they have checked off on her 11/27   - Knee hot to touch, swollen and tender, holding on antibiotics and consulting ID   - Synovial joint fluid study pending  -consulting ID- Dr. Mario Kilpatrick, due to worsening left knee pain/swelling     Anemia: Iron Deficiency, Hgb trending down to 8.4 from 10.5 a month ago  -Ferritin 32, Iron 25 (low), s/p Gastric Bypass, poor absorption from the gut  -begin IV Venofer 100 mg daily x 3 days   -GI saw her- Dr. Miriam Marmolejo and doesn't believe she needs an endoscopy/colonoscopy now   -11/29 Occult Stool x 1- pending     Hypothyroidism  -Continue Synthroid     Pseudoseizures  -- Continue home regimen     Chronic pain   -- Continue home regimen of Percocet 5/325 for mild/moderate pain    Diet ADULT DIET; Regular   DVT Prophylaxis [x] Lovenox, []  Heparin, [] SCDs, [] Ambulation,  [] Eliquis, [] Xarelto  [] Coumadin   Code Status Full Code   Disposition From: Home  Expected Disposition: SNF  Estimated Date of Discharge: 1-2 days   Patient requires continued admission due to SBO with continued N&V, Abd Pain, advancing diet, monitored by Gen Surgery   Surrogate Decision Maker/ POA Self      Subjective:     Chief Complaint: Abdominal Pain (x1 week w/nausea ) and Knee Pain (Left knee x1 week (hx of blood clots))       Zack Broussard is a 45 y.o. female who presents with Severe Abdominal Pain, N&V and watery stools for 1 week. Also left knee pain/swelling and follows with Dr. Myah Espana for her Septic Knee and just completed a 6 week course of ceftriaxone. Pain is managed with Percocet 5/325 mg QID and gabapentin 800 mg TID per [facility] Report.   Had CT abd/pelvis which showed short segment of mildly dilated loop of small bowel in left mid abdomen, ileus vs low grade obstruction. Started on her Clear Liquids, then advanced to Full Liquids and now Regular Soft Diet per General Surgery. Doing fairly well with her diet and not much nausea or vomiting, also passing large amounts of soft stool. Ortho was consulted on her left knee and checked off on her 11/27. ID was consulted 11/28 due to worsening knee pain/swelling and CRP elevated to 9.2. GI consulted due to worsening anemia, no interventions needed at present, so IV Venofer 100 mg added daily X 3 days due to poor absorption from Gastric Bypass and low iron studies. Review of Systems:    Ten point ROS is negative, unless otherwise noted above. Objective: Intake/Output Summary (Last 24 hours) at 11/29/2022 1447  Last data filed at 11/28/2022 2037  Gross per 24 hour   Intake 120 ml   Output --   Net 120 ml        Vitals:   Vitals:    11/29/22 1441   BP: 110/65   Pulse: 51   Resp:    Temp:    SpO2: 97%       Physical Exam:     General: NAD  Eyes: EOMI  ENT: neck supple  Cardiovascular: Regular rate. Normal S1/S2, no murmurs or gallops  Respiratory: Clear to auscultation bilaterally, normal oxygenation on RA  Gastrointestinal: Soft, tenderness to palpation of Epigastrum and RUQ, normal BS x 4  Genitourinary: no suprapubic tenderness  Musculoskeletal: mild edema of left knee, hot to touch, no erythema or bruising  Skin: warm, dry  Neuro: Alert and oriented x 3  Psych: Mood appropriate.      Medications:   Medications:    iron sucrose  100 mg IntraVENous Daily    polyethylene glycol  17 g Oral Daily    hydrOXYzine HCl  50 mg Oral BID    levETIRAcetam  1,000 mg Oral BID    levothyroxine  125 mcg Oral QAM AC    pantoprazole  40 mg Oral QAM AC    PARoxetine  40 mg Oral Daily    sodium chloride flush  5-40 mL IntraVENous 2 times per day    enoxaparin  30 mg SubCUTAneous BID    gabapentin  800 mg Oral TID    estradiol  0.5 mg Oral Daily      Infusions: sodium chloride      sodium chloride 1,000 mL (11/28/22 2042)     PRN Meds: morphine, 2 mg, Q8H PRN  polyvinyl alcohol, 1 drop, Q2H PRN  promethazine, 6.25 mg, Q6H PRN  bisacodyl, 10 mg, Daily PRN  heparin flush, 100 Units, PRN  albuterol, 2.5 mg, Q6H PRN  oxyCODONE-acetaminophen, 1 tablet, Q6H PRN  sodium chloride flush, 10 mL, PRN  sodium chloride, 500 mL, PRN  ondansetron, 4 mg, Q8H PRN   Or  ondansetron, 4 mg, Q6H PRN  nicotine polacrilex, 2 mg, Q1H PRN  acetaminophen, 650 mg, Q6H PRN   Or  acetaminophen, 650 mg, Q6H PRN  tiZANidine, 4 mg, Q8H PRN      Labs      Recent Results (from the past 24 hour(s))   GI Disease Panel PCR    Collection Time: 11/28/22  4:20 PM    Specimen: Stool   Result Value Ref Range    Campylobacter PCR NOT DETECTED NOT DETECTED    Plesiomonas Shigelloides PCR NOT DETECTED NOT DETECTED    Salmonella PCR NOT DETECTED NOT DETECTED    Vibrio PCR NOT DETECTED NOT DETECTED    Vibrio Cholerae PCR NOT DETECTED NOT DETECTED    Yersinia Enterocolitica PCR NOT DETECTED NOT DETECTED    E Coli Enteroaggregative PCR NOT DETECTED NOT DETECTED    E Coli Enteropathogenic PCR NOT DETECTED NOT DETECTED    E Coli Enterotoxigenic PCR NOT DETECTED NOT DETECTED    E Coli Shiga Like Toxin PCR NOT DETECTED NOT DETECTED    E Coli O157 PCR NOT DETECTED NOT DETECTED    E Coli Shigella/Enteroinvasive PCR NOT DETECTED NOT DETECTED    Cryptosporidium PCR NOT DETECTED NOT DETECTED    Cyclospora Cayetanensis PCR NOT DETECTED NOT DETECTED    Entamoeba Histolytica PCR NOT DETECTED NOT DETECTED    Giardia Lamblia PCR NOT DETECTED NOT DETECTED    Adenovirus F 40 41 PCR NOT DETECTED NOT DETECTED    Astrovirus PCR NOT DETECTED NOT DETECTED    Norovirus GI GII PCR NOT DETECTED NOT DETECTED    Rotavirus A PCR NOT DETECTED NOT DETECTED    Sapovirus PCR NOT DETECTED NOT DETECTED   SPECIMEN REJECTION    Collection Time: 11/29/22 12:03 AM   Result Value Ref Range    Rejected Test CDIFQ     Reason for Rejection SPECIMEN NOT PROCESSED PER    Basic Metabolic Panel w/ Reflex to MG    Collection Time: 11/29/22  6:40 AM   Result Value Ref Range    Sodium 139 135 - 145 MMOL/L    Potassium 4.2 3.5 - 5.1 MMOL/L    Chloride 107 99 - 110 mMol/L    CO2 23 21 - 32 MMOL/L    Anion Gap 9 4 - 16    BUN 8 6 - 23 MG/DL    Creatinine 0.7 0.6 - 1.1 MG/DL    Est, Glom Filt Rate >60 >60 mL/min/1.73m2    Glucose 103 (H) 70 - 99 MG/DL    Calcium 9.5 8.3 - 10.6 MG/DL   CBC with Auto Differential    Collection Time: 11/29/22  6:40 AM   Result Value Ref Range    WBC 3.5 (L) 4.0 - 10.5 K/CU MM    RBC 3.53 (L) 4.2 - 5.4 M/CU MM    Hemoglobin 8.8 (L) 12.5 - 16.0 GM/DL    Hematocrit 29.5 (L) 37 - 47 %    MCV 83.6 78 - 100 FL    MCH 24.9 (L) 27 - 31 PG    MCHC 29.8 (L) 32.0 - 36.0 %    RDW 13.4 11.7 - 14.9 %    Platelets 856 066 - 630 K/CU MM    MPV 10.2 7.5 - 11.1 FL    Differential Type AUTOMATED DIFFERENTIAL     Segs Relative 48.4 36 - 66 %    Lymphocytes % 34.8 24 - 44 %    Monocytes % 8.4 (H) 0 - 4 %    Eosinophils % 7.5 (H) 0 - 3 %    Basophils % 0.6 0 - 1 %    Segs Absolute 1.7 K/CU MM    Lymphocytes Absolute 1.2 K/CU MM    Monocytes Absolute 0.3 K/CU MM    Eosinophils Absolute 0.3 K/CU MM    Basophils Absolute 0.0 K/CU MM    Nucleated RBC % 0.0 %    Total Nucleated RBC 0.0 K/CU MM    Total Immature Neutrophil 0.01 K/CU MM    Immature Neutrophil % 0.3 0 - 0.43 %   ANEMIA PANEL    Collection Time: 11/29/22 10:15 AM   Result Value Ref Range    WBC 3.0 (L) 4.0 - 10.5 K/CU MM    RBC 3.34 (L) 4.2 - 5.4 M/CU MM    Hemoglobin 8.4 (L) 12.5 - 16.0 GM/DL    Hematocrit 28.0 (L) 37 - 47 %    MCV 83.8 78 - 100 FL    MCH 25.1 (L) 27 - 31 PG    MCHC 30.0 (L) 32.0 - 36.0 %    RDW 13.3 11.7 - 14.9 %    Platelets 880 (L) 276 - 440 K/CU MM    MPV 10.8 7.5 - 11.1 FL    Differential Type AUTOMATED DIFFERENTIAL     Segs Relative 46.1 36 - 66 %    Lymphocytes % 38.9 24 - 44 %    Monocytes % 7.0 (H) 0 - 4 %    Eosinophils % 7.0 (H) 0 - 3 %    Basophils % 0.7 0 - 1 %    Segs Absolute 1.4 K/CU MM    Lymphocytes Absolute 1.2 K/CU MM    Monocytes Absolute 0.2 K/CU MM    Eosinophils Absolute 0.2 K/CU MM    Basophils Absolute 0.0 K/CU MM    Nucleated RBC % 0.0 %    Total Nucleated RBC 0.0 K/CU MM    Total Immature Neutrophil 0.01 K/CU MM    Immature Neutrophil % 0.3 0 - 0.43 %    Vitamin B-12 1869 (H) 211 - 911 pg/ml    Folate 10.4 3.1 - 17.5 NG/ML    Iron 25 (L) 37 - 145 ug/dL    UIBC 242 110 - 370 ug/dL    TIBC 267 250 - 450 ug/dL    Transferrin % 9 (L) 10 - 44 %    Retic Ct Pct 1.0 0.2 - 2.20 %    Ferritin 32 15 - 150 NG/ML   Lactic Acid    Collection Time: 11/29/22 10:15 AM   Result Value Ref Range    Lactate 0.9 0.4 - 2.0 mMOL/L   C-Reactive Protein    Collection Time: 11/29/22 10:15 AM   Result Value Ref Range    CRP High Sensitivity 7.6 (H) <5.0 mg/L   Sedimentation Rate    Collection Time: 11/29/22 10:15 AM   Result Value Ref Range    Sed Rate 16 0 - 30 MM/HR        Imaging/Diagnostics Last 24 Hours   No results found.     Electronically signed by MICHAEL Oneill CNP on 11/29/2022 at 2:47 PM

## 2022-11-29 NOTE — CONSULTS
Infectious Disease Consult Note  2022   Patient Name: Alma Purcell : 1970     Assessment  Left knee hemarthrosis  Unlikely to be infection  Hemarthrosis, livedo reticularis, recent hx of left leg DVT, consider antiphospholipid syndrome or vWD  Morbid obesity  Comorbid conditions:     Plan  Therapeutic: continue to hold abx  Diagnostic:  F/u:  Other: Advise hematology consult to evaluate for vWD or antiphospholipid syndrome  Will follow peripherally. Thank you for allowing me to consult in the care of this patient.  ------------------------  REASON FOR CONSULT: Infective syndrome   Requested by: Aleyda Olivia CNP  HPI:Patient is a 45 y.o. female who was admitted 2022 for further evaluation and management of left knee swelling. She is known to the infectious disease service from previous admissions. Most recently she had a 16-day stay in the hospital from 2022 through 10/5/2022 for left knee swelling. She initially underwent a left knee arthroscopic irrigation and debridement with drain placement on 2022. Cutibacterium acnes was isolated from broth. She received 6-week course of ceftriaxone that was completed on 11/15/2022. She reported that her left knee is once more swollen. She was evaluated by the orthopedic surgery service and underwent left knee arthrocentesis. It showed a hemorrhagic left knee effusion. ? Infectious diseases service was consulted to evaluate the pt, and recommend further investigative and therapeutic measures. Review and summary of old records:  ROS: Other systems reviewed Including eyes, ENT, respiratory, cardiovascular, GI, , dermatologic, neurologic, psych, hem/lymphatic, musculoskeletal and endocrine were negative other than what is mentioned above.      Patient Active Problem List    Diagnosis Date Noted    Septic arthritis of knee, left ([address] 10/16/2022    Ileus ([address] 2022    Hemarthrosis of left knee 10/28/2022    Acute deep vein thrombosis (DVT) of popliteal vein of left lower extremity ([facility] 09/27/2022    Swelling of joint of left knee 09/27/2022    Myalgia 09/21/2022    Acute bilateral deep vein thrombosis (DVT) of femoral veins ([address] 09/19/2022    Diarrhea 04/28/2022    Pseudoseizures ([facility] 04/15/2022    Port-A-Cath in place 07/30/2021    Malabsorption due to intolerance, not elsewhere classified 06/28/2021    Hypothyroidism due to acquired atrophy of thyroid 05/12/2021    Vitamin D deficiency 05/12/2021    Irritable bowel syndrome 05/12/2021    Nausea 05/09/2021    COPD (chronic obstructive pulmonary disease) ([facility]     Malabsorption 04/06/2021    Cellulitis of left thigh 03/03/2021    Chest pain of uncertain etiology 43/85/2055    Discoloration of skin of flank resembling ecchymosis 01/15/2021    RUQ pain 09/30/2020    Anxiety 08/17/2020    History of Jet-en-Y gastric bypass     Iron deficiency anemia 04/07/2020    Morbid obesity with BMI of 40.0-44.9, adult ([facility] 03/27/2020    Lymph node disorder 12/04/2019    Receiving intravenous antibiotic treatment as outpatient 07/10/2019    Chronic pain syndrome 04/01/2019    Drug-seeking/Aberrant behavior 04/01/2019    Hiatal hernia     Frequent UTI 04/28/2016    Gastroesophageal reflux disease without esophagitis 04/28/2016    Chronic depression 04/28/2016    Fatty liver disease, nonalcoholic 59/34/0080    Arthritis 04/28/2016    Bilateral low back pain with left-sided sciatica 04/28/2016    Chronic pancreatitis ([facility]     HTN (hypertension)     Fibromyalgia 04/19/2013    Lupus (systemic lupus erythematosus) ([address] 04/19/2013    Fever 11/16/2011     Past Medical History:   Diagnosis Date    Acid reflux     Anemia     Anxiety     Arthritis     Hands, Back And Ankles    Arthritis     Bleeding ulcer 2014    \"I Had Ulcers In My Stomach And Colon\"/ per pt on 8/12/2019\"they said recently having some blood in my stomach- in July ( 2019)could not find where coming from \"    Bronchitis Last Episode 2014    Chronic back pain     Chronic pain     [facility]    COPD (chronic obstructive pulmonary disease) [facility])     Sees Dr. Tamara Gilford    Depression     Disease of blood and blood forming organ     Fibromyalgia Dx 2013    GERD (gastroesophageal reflux disease)     H/O echocardiogram 08/11/2015    EF >55%. LA to be at the upper limit of normal in size. LV hypertrophy with normal LV systolic, but abnormal diastolic function. Normal valvular structures and function. H/O echocardiogram 08/30/2018    EF 55-60%    Hiatal hernia     History of blood transfusion 09/2015 And 2018    No Reaction To Blood Transfusions Received    History of sleeve gastrectomy     [place] (hard of hearing)     Right Ear    Hx of cardiac catheterization 10/24/2010    Angiographically normal coronary arteries w/ normal LV function and wall motion. Hx of transesophageal echocardiography (PILO) for monitoring 12/02/2010    EF 55-60%. WNL.     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 name    Hypertension     Immune deficiency disorder ([address]     Kidney cysts     \"they found that I have a kidney cyst but not sure which side\" per pt on 7/15/2020    Kidney stone     Lupus ([address] Dx 2013    \"Rheumatoid Lupus\"    MDRO (multiple drug resistant organisms) resistance     4 months ago chest from mediport    Morbid obesity ([address]     MRSA bacteremia     Nausea     \"Most Of The Time\"    Osteomyelitis of fifth toe of left foot (HCC)     Pain management     Sees Dr. Suggs [facility], Once A Month    Pancreatitis chronic Dx 2001    Pneumonia Dx 11-15    Seizures ([address]     Shortness of breath on exertion     Shortness of breath on exertion     Sleep apnea     Has CPAP\"no longer use the cpap since lost weight\"    Staph infection Dx 11-15    Left Foot    Staph infection Dx 11-15    \"Left Foot\"    Teeth missing     Upper And Lower    Thyroid disease     Wears glasses     To Read      Past Surgical History:   Procedure Laterality Date    ABDOMEN SURGERY      APPENDECTOMY  02/1998    Done When Tubes And Ovaries Were Removed    APPENDECTOMY      CARDIAC CATHETERIZATION  10/24/2010    CATHETER REMOVAL N/A 7/16/2019    PORT REMOVAL performed by Jennifer Murillo MD at [facility]  08/27/1991    CHOLECYSTECTOMY      CHOLECYSTECTOMY, LAPAROSCOPIC  1990's    COLONOSCOPY  Last Done In 2000's    DENTAL SURGERY      Teeth Extracted In Past    ENDOSCOPY, COLON, DIAGNOSTIC  Last Done In 2018    FOOT DEBRIDEMENT Left 6/16/2019    FIRST METATARSAL DEBRIDEMENT INCISION AND DRAINAGE. EXCISION OF ULCER.  RESECTION OF BONE. 1ST METATARSAL POWER LAVAGE AND BONE CEMENT performed by Isacc Grewal DPM at [facility] [address] Left 4/15/2022    LEFT FOOT ABSCESS DEBRIDEMENT INCISION AND DRAINAGE, CYST REMOVAL performed by Isacc Grewal DPM at [address] [facility] Left Last Done In 2016     Dr. Taran Ron, \" About 6 Surgeries Done Because Of Staph Infection\"    HERNIA REPAIR      HIATAL HERNIA REPAIR N/A 2/12/2019    HERNIA HIATAL LAPAROSCOPIC ROBOTIC performed by Jennifer Murillo MD at [facility] ([facility])  10/1997    Partial Abdominal Hysterectomy    HYSTERECTOMY (CERVIX STATUS UNKNOWN)      INSERTION / REMOVAL / REPLACEMENT VENOUS ACCESS CATHETER N/A 8/15/2019    PORT INSERTION performed by Jennifer Murillo MD at [facility]. ARTHROSCOPY Left 9/20/2022    LEFT KNEE ARTHROSCOPIC IRRIGATION AND DEBRIDEMENT WITH DRAIN PLACEMENT performed by Chika Allison DO at [facility]  ~2000    removed after 6 months    LEG BIOPSY EXCISION Left 3/8/2021    LEFT THIGH LESION BIOPSY EXCISION performed by Jennifer Murillo MD at [place] [address] LUNG REMOVAL, PARTIAL Left 2008    Benign    OTHER SURGICAL HISTORY  02/1998    \"Tubes And Ovaries Removed, Appendectomy Also Done\"    OTHER SURGICAL HISTORY  Last Done 7-15-16    Mediport Insertion \"Total Of Six Done, Removed Last Mediport In 2014\"    PORT SURGERY N/A 1/15/2020    PORT REMOVAL performed by Nigel Howell MD at [address] N/A 7/6/2020    PORT INSERTION performed by Nigel Howell MD at [address] Left 8/3/2021    MEDIPORT REPLACEMENT performed by Nigel Howell MD at [address] [place] [address] N/A 8/16/2021    GASTRIC BYPASS RUFINO-EN-Y LAPAROSCOPIC ROBOTIC, LYSIS OF ADHESIONS performed by Nigel Howell MD at [facility] N/A 2/12/2019    GASTRECTOMY SLEEVE LAPAROSCOPIC ROBOTIC performed by Nigel Howell MD at [address] [place] [address]  08/27/2018    UPPER GASTROINTESTINAL ENDOSCOPY N/A 4/2/2019    EGD CONTROL HEMORRHAGE with epi injection at bleeding site performed by Nigel Howell MD at [place] [address] 6/19/2019    EGD DIAGNOSTIC ONLY performed by Nigel Howell MD at [place] [address] N/A 7/22/2019    EGD DIAGNOSTIC ONLY performed by Harman Coppola MD at [place] [address] 10/14/2019    EGD DIAGNOSTIC ONLY performed by Nigel Howell MD at [place] [address] N/ 7/17/2020    EGJ DILATATION BALLOON WITH 18-20 MM BALLOON, DILATED TO 20 MM. performed by Nigel Howell MD at [place] [address] N/A 5/28/2021    EGD BIOPSY performed by Nigel Howell MD at [address] [place] [address]        Family History   Problem Relation Age of Onset    Diabetes Mother         \"Borderline Diabetes\"    High Blood Pressure Mother     Obesity Mother     Arthritis Mother     Heart Disease Mother     High Cholesterol Mother     Vision Loss Mother     Diabetes Father     High Blood Pressure Father     Asthma Father     Cancer Father         prostate cancer    High Blood Pressure Brother     Asthma Son     Vision Loss Son     Lupus Daughter     Other Daughter         \"Alot Of Female Problems\"      Infectious disease related family history - not contibutory. SOCIAL HISTORY  Social History     Tobacco Use    Smoking status: Never    Smokeless tobacco: Never   Substance Use Topics    Alcohol use: Never      Ancestry: White     ? ALLERGIES  Allergies   Allergen Reactions    Aspirin Palpitations     \"My Heart Rate Elevates\"    Compazine [Prochlorperazine] Rash    Shellfish-Derived Products Swelling    Toradol [Ketorolac Tromethamine] Rash    Haldol [Haloperidol] Other (See Comments)     States \"shook severely and had to have Benadryl\"    Asa [Aspirin]     Compazine [Prochlorperazine]     Haldol [Haloperidol]      Shaking      Reglan [Metoclopramide] Itching    Reglan [Metoclopramide]     Toradol [Ketorolac Tromethamine]       MEDICATIONS  Reviewed and are per the chart/EMR. IMMUNIZATION HISTORY  Immunization History   Administered Date(s) Administered    COVID-19, PFIZER Bivalent BOOSTER, (age 12y+), IM, 30 mcg/0.3 mL dose 11/21/2022    COVID-19, PFIZER PURPLE top, DILUTE for use, (age 15 y+), 30mcg/0.3mL 05/18/2020, 03/30/2021, 05/18/2021, 01/07/2022    Influenza Vaccine, unspecified formulation 10/01/2008, 11/02/2015    Influenza Virus Vaccine 11/02/2015, 08/24/2016, 12/01/2018, 08/23/2020, 11/03/2021    Influenza, FLUARIX, FLULAVAL, [place] (age 10 mo+) AND AFLURIA, (age 1 y+), PF, 0.5mL 09/21/2017, 10/22/2018, 09/30/2019, 08/23/2020, 11/05/2021    Pneumococcal Conjugate 13-valent (Michael Sans) 01/01/2006    Zoster Recombinant (Shingrix) 08/23/2020, 11/22/2020     ?   Antibiotics: none  ?  -------------------------------------------------------------------------------------------------------------------    Vital Signs:  Vitals:    11/29/22 0330   BP: (!) 116/58   Pulse: 69   Resp: 16   Temp: 97.3 °F (36.3 °C)   SpO2: 90%         Exam:    VS: noted; wt 111.5 kg  Gen: alert and oriented X3, no distress  Skin: no stigmata of endocarditis; livedo reticularis of both upper extremities  Wounds: C/D/I  HEMT: AT/NC Oropharynx pink, moist, and without lesions or exudates;   Eyes: PERRLA, EOMI, conjunctiva pink, sclera anicteric. Neck: Supple. Trachea midline. No LAD. Chest: no distress and CTA. Good air movement. Heart: RRR and no MRG. Abd: soft, non-distended, no tenderness, no hepatomegaly. Normoactive bowel sounds. Ext: no clubbing, cyanosis, or edema  Catheter Site: without erythema or tenderness  LDA: Left anterior chest Mediport  Neuro: Mental status intact. CN 2-12 intact and no focal sensory or motor deficits    ? Diagnostic Studies: reviewed  ? ? I have examined this patient and available medical records on this date and have made the above observations, conclusions and recommendations.   Electronically signed by: Electronically signed by Suzan Mckinnon MD on 11/29/2022 at 12:45 PM

## 2022-11-29 NOTE — PROGRESS NOTES
General Surgery-Dr. Fred Onofre Day: 4    Chief Complaint on Admission: possible ileus      Subjective: Tolerated soft regular food. Having bowel function. Some liquid now. States abdominal symptoms improving but still with knee pain. Denies F/C. Was moved to a different room. Reports walking in [place] prior to move to new room. Denies any N/V today. ROS:  Review of Systems   HENT: Negative. Eyes: Negative. Respiratory: Negative. Cardiovascular: Negative. Gastrointestinal:  Negative for abdominal pain and nausea. Endocrine: Negative. Genitourinary: Negative. Musculoskeletal:  Positive for arthralgias, back pain and joint swelling. Skin:  Negative for color change. Allergic/Immunologic: Negative. Hematological: Negative. All other systems reviewed and are negative. Allergies  Aspirin, Compazine [prochlorperazine], Shellfish-derived products, Toradol [ketorolac tromethamine], Haldol [haloperidol], Asa [aspirin], Compazine [prochlorperazine], Haldol [haloperidol], Reglan [metoclopramide], Reglan [metoclopramide], and Toradol [ketorolac tromethamine]          Diagnosis Date    Acid reflux     Anemia     Anxiety     Arthritis     Hands, Back And Ankles    Arthritis     Bleeding ulcer 2014    \"I Had Ulcers In My Stomach And Colon\"/ per pt on 8/12/2019\"they said recently having some blood in my stomach- in July ( 2019)could not find where coming from \"    Bronchitis Last Episode 2014    Chronic back pain     Chronic pain     [facility]    COPD (chronic obstructive pulmonary disease) ([facility] [address]     Sees Dr. Casa Chun    Depression     Disease of blood and blood forming organ     Fibromyalgia Dx 2013    GERD (gastroesophageal reflux disease)     H/O echocardiogram 08/11/2015    EF >55%. LA to be at the upper limit of normal in size. LV hypertrophy with normal LV systolic, but abnormal diastolic function.  Normal valvular structures and function. H/O echocardiogram 2018    EF 55-60%    Hiatal hernia     History of blood transfusion 2015 And     No Reaction To Blood Transfusions Received    History of sleeve gastrectomy     [place] (hard of hearing)     Right Ear    Hx of cardiac catheterization 10/24/2010    Angiographically normal coronary arteries w/ normal LV function and wall motion. Hx of transesophageal echocardiography (PILO) for monitoring 2010    EF 55-60%. WNL. HX OTHER MEDICAL     per old chart hx of sepsis and dx left 5th metatarsal MSSA osteomylitis- consult with Dr Tiny Rodriguez     \"very difficulty IV stick- had mediport infection in the past- then put picc line in and removed 2019 now going to put new mediport in\"( 8/15/2019)    Hypertension     follow with  ? name    Hypertension     Immune deficiency disorder ([facility] [address]     Kidney cysts     \"they found that I have a kidney cyst but not sure which side\" per pt on 7/15/2020    Kidney stone     Lupus ([address] Dx     \"Rheumatoid Lupus\"    MDRO (multiple drug resistant organisms) resistance     4 months ago chest from mediport    Morbid obesity ([address]     MRSA bacteremia     Nausea     \"Most Of The Time\"    Osteomyelitis of fifth toe of left foot (HCC)     Pain management     Sees Dr. Grupo Estrella, Once A Month    Pancreatitis chronic Dx     Pneumonia Dx -15    Seizures ([address]     Shortness of breath on exertion     Shortness of breath on exertion     Sleep apnea     Has CPAP\"no longer use the cpap since lost weight\"    Staph infection Dx -15    Left Foot    Staph infection Dx 11-15    \"Left Foot\"    Teeth missing     Upper And Lower    Thyroid disease     Wears glasses     To Read       Objective:     Vitals:    22 0330   BP: (!) 116/58   Pulse: 69   Resp: 16   Temp: 97.3 °F (36.3 °C)   SpO2: 90%       TEMPERATURE:  Current -Temp: 97.3 °F (36.3 °C);  Max - Temp  Av.9 °F (36.6 °C)  Min: 97.3 °F (36.3 °C)  Max: 98.3 °F (36.8 °C)    No intake/output data recorded. I/O last 3 completed shifts: In: 240 [P.O.:240]  Out: 1000 [Urine:1000]      Physical Exam:  Physical Exam  Vitals reviewed. Constitutional:       General: She is not in acute distress. Appearance: She is obese. Comments: Sitting up in bed, eating. HENT:      Head: Normocephalic and atraumatic. Right Ear: External ear normal.      Left Ear: External ear normal.   Eyes:      General:         Right eye: No discharge. Left eye: No discharge. Extraocular Movements: Extraocular movements intact. Cardiovascular:      Rate and Rhythm: Normal rate. Pulmonary:      Effort: No respiratory distress. Abdominal:      Palpations: Abdomen is soft. Tenderness: There is no abdominal tenderness. There is no guarding or rebound. Musculoskeletal:         General: No swelling. Skin:     General: Skin is warm. Neurological:      General: No focal deficit present. Mental Status: She is alert.    Psychiatric:         Mood and Affect: Mood normal.         Scheduled Meds:   polyethylene glycol  17 g Oral Daily    hydrOXYzine HCl  50 mg Oral BID    levETIRAcetam  1,000 mg Oral BID    levothyroxine  125 mcg Oral QAM AC    pantoprazole  40 mg Oral QAM AC    PARoxetine  40 mg Oral Daily    sodium chloride flush  5-40 mL IntraVENous 2 times per day    enoxaparin  30 mg SubCUTAneous BID    gabapentin  800 mg Oral TID    estradiol  0.5 mg Oral Daily     ContinuousInfusions:   sodium chloride      sodium chloride 1,000 mL (11/28/22 2042)     PRN Meds:promethazine, HYDROmorphone, bisacodyl, heparin flush, albuterol, oxyCODONE-acetaminophen, sodium chloride flush, sodium chloride, ondansetron **OR** ondansetron, nicotine polacrilex, acetaminophen **OR** acetaminophen, tiZANidine      Labs/Imaging Results:   Lab Results   Component Value Date    WBC 3.5 (L) 11/29/2022    HGB 8.8 (L) 11/29/2022    HCT 29.5 (L) 11/29/2022    MCV 83.6 11/29/2022     11/29/2022     Lab Results   Component Value Date     11/29/2022    K 4.2 11/29/2022     11/29/2022    CO2 23 11/29/2022    BUN 8 11/29/2022    CREATININE 0.7 11/29/2022    GLUCOSE 103 (H) 11/29/2022    CALCIUM 9.5 11/29/2022    PROT 6.5 11/26/2022    LABALBU 3.9 11/26/2022    BILITOT 0.2 11/26/2022    ALKPHOS 107 11/26/2022    AST 13 (L) 11/26/2022    ALT 11 11/26/2022    LABGLOM >60 11/29/2022    GFRAA >60 10/11/2022     GI Panel - unremarkable. Assessment:       46 y/o F with L knee pain and concern for possible ileus, resolving    Plan:       -Abdominal symptoms improving. Tolerated soft regular diet. Will advance to regular.   -Abdominal exam remains benign.   -Main complaint at this time related to back pain and knee pain. F/u with Ortho recs. -Will follow. No acute intervention planned from Gen Surg standpoint at this time. -GI panel was unremarkable.   -Above plan d/w pt.        Electronically signed by Bonny Ball II, MD on 11/29/2022 at 9:32 AM

## 2022-11-29 NOTE — PLAN OF CARE
Problem: ABCDS Injury Assessment  Goal: Absence of physical injury  11/28/2022 1201 by Davida Ocasio LPN  Outcome: Progressing     Problem: Discharge Planning  Goal: Discharge to home or other facility with appropriate resources  11/28/2022 2302 by Gracie Ruiz RN  Outcome: Progressing  11/28/2022 1201 by Davida Ocasio LPN  Outcome: Progressing     Problem: Pain  Goal: Verbalizes/displays adequate comfort level or baseline comfort level  11/28/2022 2302 by Gracie Ruiz RN  Outcome: Progressing  11/28/2022 1201 by Davida Ocasio LPN  Outcome: Progressing

## 2022-11-29 NOTE — CARE COORDINATION
[facility] Liaison spoke with pt and pt is agreeable for hhc to be resumed at discharge. Address, pcp & number has not changed. No wounds or IVs noted.  Please place inpatient consult to home health needs order in [facility].

## 2022-11-29 NOTE — CONSULTS
[address] [place] [address], [address] [facility]                                  CONSULTATION    PATIENT NAME: Derik Norman                  :        1970  MED REC NO:   [id number]                          ROOM:       4016  ACCOUNT NO:   [de-identified]                           ADMIT DATE: 2022  PROVIDER:     Eev Garcia MD    CONSULT DATE:  2022    CHIEF COMPLAINT:  Right upper quadrant abdominal pain with abnormal  imaging/pneumobilia. HISTORY OF PRESENT ILLNESS:  As follows. The patient is a 43-year-old  white female patient known to me from her multiple previous  hospitalization, last seen in consult on 2022 with past medical  history significant for morbid obesity, status post sleeve gastrectomy  initially by Dr. Andrzej Ashley, which was converted to Jet-en-Y gastric  bypass with lysis of adhesions on 2021, also history of lupus,  hypertension, depression/anxiety, gastroesophageal reflux disease,  peptic ulcer disease and hypothyroidism, history of gallstone status  post cholecystectomy complicated by bile duct injury for which the  patient had at least seven or eight ERCPs done in [place], [place]  with placement of biliary/pancreatic stents, which were subsequently  removed. Also history of acute/recurrent/chronic pancreatitis, anemia,  chronic back pain, fibromyalgia, history of recurrent episodes of GI  bleeding requiring multiple EGDs, seizure disorder, chronic pain  syndrome, DVT and septic left knee. The patient was admitted to the  hospital on 2022 with pain in the left knee and also right upper  quadrant abdominal pain. There is no history of hematemesis, melena or  hematochezia. The patient does complain of occasional vomiting. The  patient had a blood workup done upon admission which comprised of Chem  profile, which was unremarkable.   LFTs were within normal limits and CBC  showed a WBC count of 4.5, hemoglobin was 9.7 and platelet count was  697,030. The patient has had multiple CAT scans done of the abdomen and  pelvis, last CAT scan on 11/26/2022 showed a short segment of mildly  dilated loop of the small bowel in the left mid-abdomen suggestive of  ileus and postsurgical changes were noted from prior Jet-en-Y gastric  bypass, also postcholecystectomy changes were noted along with  pneumobilia with mild prominence of the common bile duct from prior  ERCPs with endoscopic sphincterotomy. The patient's LFTs are within  normal limits and ultrasound of the right upper quadrant has been  ordered today. The patient is hemodynamically stable. The patient has had multiple EGDs done at least three EGDs by me and  four by Dr. Dean Cortes.  The last EGD by Dr. Dean Cortes on 04/02/2019, which  showed bleeding from the distal staple line. The patient also has had a  colonoscopy done in the past also and is being followed up by pain  specialist for intractable upper abdominal pain. The patient has been  to [facility] also in 2020 for chronic right upper quadrant  abdominal pain and possible ERCP, but no ERCP was done and the patient  was discharged to be followed up locally here in [facility]. The  patient also has had MRCP done on the 09/23/2020, which was unremarkable  with no evidence of common bile duct obstruction due to stones. The  patient is hemodynamically stable and clinically there is no evidence of  gross GI bleeding. REVIEW OF SYSTEM:  CENTRAL NERVOUS SYSTEM EXAMINATION:  The patient denies headache or  focal sensorimotor symptoms. CARDIOVASCULAR SYSTEM:  No history of chest pain or shortness of breath. The patient complains of swelling of the left lower extremity. GENITOURINARY SYSTEM:  No history of dysuria, pyuria or hematuria. MUSCULOSKELETAL SYSTEM:  The patient complains of pain in the left knee.   RESPIRATORY SYSTEM:  No history of cough, hemoptysis, fever or chills. PAST MEDICAL HISTORY:  Significant for history of morbid obesity status  post sleeve gastrectomy initially by Dr. Parisa Nation, which was converted to  Jet-en-Y gastric bypass with lysis of adhesions on 2021, also  history of lupus, hypertension, depression/anxiety, gastroesophageal  reflux disease, peptic ulcer disease, hypothyroidism, history of  gallstones status post cholecystectomy complicated by bile duct injury  for which the patient had 7 or 8 ERCPs done in [place], [place] with  placement of biliary/pancreatic stents which were subsequently removed  and also history of acute/recurrent/chronic pancreatitis, anemia,  chronic back pain, fibromyalgia, history of recurrent episodes of upper  GI bleeding requiring multiple EGDs, seizure disorder, chronic pain  syndrome and also DVT and septic left knee. FAMILY HISTORY:  The patient's father was diagnosed with carcinoma of  the prostate, maternal aunt with carcinoma of the breast and maternal  grandmother with carcinoma of the \"stomach\" and maternal grandfather  with carcinoma of the lung. MEDICATIONS:  Please refer to the chart. SOCIOECONOMIC HISTORY:  No history of EtOH abuse. The patient does not  smoke cigarettes. PAST SURGICAL HISTORY:  The patient has had total abdominal  hysterectomy, cholecystectomy, MediPort placed, which got infected and  was removed by Dr. Parisa Nation, appendectomy, tonsillectomy and  adenoidectomy, partial removal of the left lung for benign disease,  dental surgery, , multiple EGDs and at least one colonoscopy by  Dr. Sara Garza, 8 years ago and at least 9 or 8 ERCPs in [place], [place]  for common bile duct stone/sphincter of oddi dysfunction and placement  of biliary/pancreatic stents, which were subsequently removed.   The  patient also has had multiple EGDs done at least two with Dr. Mono Anderson and  three by me and four by Dr. Parisa Nation, also had surgery done on the left  foot for osteomyelitis, sleeve gastrectomy initially by Dr. Shyann Mcclendon,  which was converted to Jet-en-Y gastric bypass on 08/16/2021 with lysis  of adhesions and also had left knee arthroscopy on 09/20/2022. ALLERGIES:  The patient has multiple allergies, please refer to the  chart. PHYSICAL EXAMINATION:  GENERAL:  Shows a 57-year-old white female who is obese, lying flat in  bed, in no acute distress, but complaining of pain in the left knee. She is awake, alert and oriented and pleasant to talk with. VITAL SIGNS:  Stable. HEENT:  Examination shows the skull to be atraumatic. NECK:  Supple. CHEST:  Clear. HEART:  S1, S2 are normal.  ABDOMEN:  Soft, obese, and nontender. Liver and spleen are not  palpable. RECTAL:  Exam is deferred. CNS:  Exam shows the patient to be awake, alert and oriented. There are  no focal sensorimotor sign. MUSCULOSKELETAL SYSTEM:  Exam shows evidence of degenerative joint  disease changes and swelling of the left knee. LABORATORY DATA:  As above mentioned. IMPRESSION:  3  A 57-year-old white female with multiple comorbidities admitted with  right upper quadrant abdominal pain and pain in the left knee and is  noted to have slight biliary ductal dilatation with pneumobilia on a CAT  scan, which is chronic in nature from prior ERCPs and endoscopic  sphincterotomy (the patient's LFTs are within normal limits.)  2. No evidence of acute GI pathology at this point. RECOMMENDATIONS:  1. Agree with present management. 2.  Monitor the patient's CBC and CMP. 3.  Orthopedic consult in order for left knee pain and swelling to rule  out septic arthritis. 4.  No need for repeat EGD or colonoscopy at this point. 5.  The case and plan has been discussed in detail with the patient and  all her questions have been answered. 6.  The case was also discussed with the patient's bedside RN Wanda Green.         Delilah Pappas MD    D: 11/29/2022 10:50:42       T: 11/29/2022 10:55:03     [id number]  Job#: [id number] Doc#: [id number]    CC:

## 2022-11-30 ENCOUNTER — APPOINTMENT (OUTPATIENT)
Dept: ULTRASOUND IMAGING | Age: 52
End: 2022-11-30
Payer: COMMERCIAL

## 2022-11-30 LAB
BASOPHILS ABSOLUTE: 0 K/CU MM
BASOPHILS RELATIVE PERCENT: 0.9 % (ref 0–1)
DIFFERENTIAL TYPE: ABNORMAL
EOSINOPHILS ABSOLUTE: 0.3 K/CU MM
EOSINOPHILS RELATIVE PERCENT: 7.7 % (ref 0–3)
HCT VFR BLD CALC: 28.4 % (ref 37–47)
HEMOGLOBIN: 8.6 GM/DL (ref 12.5–16)
IMMATURE NEUTROPHIL %: 0 % (ref 0–0.43)
LYMPHOCYTES ABSOLUTE: 1.3 K/CU MM
LYMPHOCYTES RELATIVE PERCENT: 38.3 % (ref 24–44)
MCH RBC QN AUTO: 25.1 PG (ref 27–31)
MCHC RBC AUTO-ENTMCNC: 30.3 % (ref 32–36)
MCV RBC AUTO: 83 FL (ref 78–100)
MONOCYTES ABSOLUTE: 0.3 K/CU MM
MONOCYTES RELATIVE PERCENT: 8 % (ref 0–4)
NUCLEATED RBC %: 0 %
PDW BLD-RTO: 13.3 % (ref 11.7–14.9)
PLATELET # BLD: 154 K/CU MM (ref 140–440)
PMV BLD AUTO: 10.8 FL (ref 7.5–11.1)
RBC # BLD: 3.42 M/CU MM (ref 4.2–5.4)
SEGMENTED NEUTROPHILS ABSOLUTE COUNT: 1.5 K/CU MM
SEGMENTED NEUTROPHILS RELATIVE PERCENT: 45.1 % (ref 36–66)
TOTAL IMMATURE NEUTOROPHIL: 0 K/CU MM
TOTAL NUCLEATED RBC: 0 K/CU MM
WBC # BLD: 3.4 K/CU MM (ref 4–10.5)

## 2022-11-30 PROCEDURE — 76705 ECHO EXAM OF ABDOMEN: CPT

## 2022-11-30 PROCEDURE — 6370000000 HC RX 637 (ALT 250 FOR IP): Performed by: NURSE PRACTITIONER

## 2022-11-30 PROCEDURE — 6360000002 HC RX W HCPCS: Performed by: NURSE PRACTITIONER

## 2022-11-30 PROCEDURE — 99232 SBSQ HOSP IP/OBS MODERATE 35: CPT | Performed by: SURGERY

## 2022-11-30 PROCEDURE — 86430 RHEUMATOID FACTOR TEST QUAL: CPT

## 2022-11-30 PROCEDURE — 86038 ANTINUCLEAR ANTIBODIES: CPT

## 2022-11-30 PROCEDURE — 6360000002 HC RX W HCPCS: Performed by: INTERNAL MEDICINE

## 2022-11-30 PROCEDURE — 99232 SBSQ HOSP IP/OBS MODERATE 35: CPT | Performed by: INTERNAL MEDICINE

## 2022-11-30 PROCEDURE — 86200 CCP ANTIBODY: CPT

## 2022-11-30 PROCEDURE — 94761 N-INVAS EAR/PLS OXIMETRY MLT: CPT

## 2022-11-30 PROCEDURE — 1200000000 HC SEMI PRIVATE

## 2022-11-30 PROCEDURE — 85025 COMPLETE CBC W/AUTO DIFF WBC: CPT

## 2022-11-30 PROCEDURE — 2580000003 HC RX 258: Performed by: NURSE PRACTITIONER

## 2022-11-30 RX ORDER — MORPHINE SULFATE 2 MG/ML
2 INJECTION, SOLUTION INTRAMUSCULAR; INTRAVENOUS EVERY 6 HOURS PRN
Status: DISCONTINUED | OUTPATIENT
Start: 2022-11-30 | End: 2022-12-01

## 2022-11-30 RX ADMIN — LEVETIRACETAM 1000 MG: 500 TABLET, FILM COATED ORAL at 21:24

## 2022-11-30 RX ADMIN — PROMETHAZINE HYDROCHLORIDE 6.25 MG: 25 INJECTION INTRAMUSCULAR; INTRAVENOUS at 22:17

## 2022-11-30 RX ADMIN — PROMETHAZINE HYDROCHLORIDE 6.25 MG: 25 INJECTION INTRAMUSCULAR; INTRAVENOUS at 09:20

## 2022-11-30 RX ADMIN — MORPHINE SULFATE 2 MG: 2 INJECTION, SOLUTION INTRAMUSCULAR; INTRAVENOUS at 05:57

## 2022-11-30 RX ADMIN — GABAPENTIN 800 MG: 400 CAPSULE ORAL at 14:58

## 2022-11-30 RX ADMIN — OXYCODONE AND ACETAMINOPHEN 1 TABLET: 5; 325 TABLET ORAL at 13:28

## 2022-11-30 RX ADMIN — OXYCODONE AND ACETAMINOPHEN 1 TABLET: 5; 325 TABLET ORAL at 01:06

## 2022-11-30 RX ADMIN — PAROXETINE HYDROCHLORIDE 40 MG: 20 TABLET, FILM COATED ORAL at 09:19

## 2022-11-30 RX ADMIN — ENOXAPARIN SODIUM 30 MG: 100 INJECTION SUBCUTANEOUS at 09:20

## 2022-11-30 RX ADMIN — PANTOPRAZOLE SODIUM 40 MG: 40 TABLET, DELAYED RELEASE ORAL at 05:57

## 2022-11-30 RX ADMIN — ESTRADIOL 0.5 MG: 1 TABLET ORAL at 09:19

## 2022-11-30 RX ADMIN — ONDANSETRON 4 MG: 2 INJECTION INTRAMUSCULAR; INTRAVENOUS at 05:57

## 2022-11-30 RX ADMIN — LEVETIRACETAM 1000 MG: 500 TABLET, FILM COATED ORAL at 09:21

## 2022-11-30 RX ADMIN — HYDROXYZINE HYDROCHLORIDE 50 MG: 50 TABLET, FILM COATED ORAL at 09:20

## 2022-11-30 RX ADMIN — HYDROXYZINE HYDROCHLORIDE 50 MG: 50 TABLET, FILM COATED ORAL at 21:24

## 2022-11-30 RX ADMIN — MORPHINE SULFATE 2 MG: 2 INJECTION, SOLUTION INTRAMUSCULAR; INTRAVENOUS at 21:24

## 2022-11-30 RX ADMIN — TIZANIDINE 4 MG: 4 TABLET ORAL at 21:25

## 2022-11-30 RX ADMIN — HYDROMORPHONE HYDROCHLORIDE 0.5 MG: 1 INJECTION, SOLUTION INTRAMUSCULAR; INTRAVENOUS; SUBCUTANEOUS at 09:25

## 2022-11-30 RX ADMIN — SODIUM CHLORIDE, PRESERVATIVE FREE 10 ML: 5 INJECTION INTRAVENOUS at 10:48

## 2022-11-30 RX ADMIN — GABAPENTIN 800 MG: 400 CAPSULE ORAL at 21:24

## 2022-11-30 RX ADMIN — GABAPENTIN 800 MG: 400 CAPSULE ORAL at 09:19

## 2022-11-30 RX ADMIN — ENOXAPARIN SODIUM 30 MG: 100 INJECTION SUBCUTANEOUS at 21:24

## 2022-11-30 RX ADMIN — MORPHINE SULFATE 2 MG: 2 INJECTION, SOLUTION INTRAMUSCULAR; INTRAVENOUS at 14:58

## 2022-11-30 RX ADMIN — LEVOTHYROXINE SODIUM 125 MCG: 0.12 TABLET ORAL at 05:57

## 2022-11-30 RX ADMIN — IRON SUCROSE 100 MG: 20 INJECTION, SOLUTION INTRAVENOUS at 09:20

## 2022-11-30 RX ADMIN — SODIUM CHLORIDE, PRESERVATIVE FREE 10 ML: 5 INJECTION INTRAVENOUS at 09:42

## 2022-11-30 RX ADMIN — SODIUM CHLORIDE, PRESERVATIVE FREE 10 ML: 5 INJECTION INTRAVENOUS at 21:24

## 2022-11-30 ASSESSMENT — ENCOUNTER SYMPTOMS
BACK PAIN: 1
RESPIRATORY NEGATIVE: 1
ABDOMINAL PAIN: 0
ALLERGIC/IMMUNOLOGIC NEGATIVE: 1
NAUSEA: 0
COLOR CHANGE: 0
EYES NEGATIVE: 1

## 2022-11-30 ASSESSMENT — PAIN SCALES - WONG BAKER
WONGBAKER_NUMERICALRESPONSE: 6

## 2022-11-30 ASSESSMENT — PAIN SCALES - GENERAL
PAINLEVEL_OUTOF10: 6
PAINLEVEL_OUTOF10: 7
PAINLEVEL_OUTOF10: 6
PAINLEVEL_OUTOF10: 7
PAINLEVEL_OUTOF10: 6
PAINLEVEL_OUTOF10: 6
PAINLEVEL_OUTOF10: 7
PAINLEVEL_OUTOF10: 6
PAINLEVEL_OUTOF10: 7
PAINLEVEL_OUTOF10: 6

## 2022-11-30 ASSESSMENT — PAIN DESCRIPTION - LOCATION
LOCATION: HEAD;ABDOMEN;LEG
LOCATION: ABDOMEN;HEAD
LOCATION: ABDOMEN;KNEE

## 2022-11-30 ASSESSMENT — PAIN - FUNCTIONAL ASSESSMENT: PAIN_FUNCTIONAL_ASSESSMENT: ACTIVITIES ARE NOT PREVENTED

## 2022-11-30 ASSESSMENT — PAIN DESCRIPTION - DESCRIPTORS: DESCRIPTORS: ACHING;THROBBING

## 2022-11-30 ASSESSMENT — PAIN DESCRIPTION - ORIENTATION: ORIENTATION: LEFT

## 2022-11-30 ASSESSMENT — PAIN DESCRIPTION - PAIN TYPE: TYPE: ACUTE PAIN

## 2022-11-30 NOTE — PROGRESS NOTES
Date of surgery: 9-     Procedure:  1. Diagnostic and operative arthroscopy of Left knee with irrigation and debridement  2. Placement of Left knee drain      History:  Jessica Day is being seen by myself today for continued left knee pain. She was actually scheduled to be seen my office today but remains admitted to [facility] due to bowel issues. Patient was admitted over the weekend for this issue and my partner was contacted in regards to the patient's continued left knee pain. An aspiration was performed at that time and was negative for any type of infection. Physical exam also suggested no concerning findings at that time. She was seen by my partner who confirmed this. Patient is doing fine orthopedically but she continues with left knee pain. They have 2/10 pain. They deny chest pain, SOB, calf pain,fever,wound drainage. No other issues. Patient continues with nausea relation to her bowel issues. She states her knee pain is improved since last being seen but she continues with episodes of swelling without erythema or significant warmth. Patient states they have been compliant with restrictions. Patient has been ambulating without assistive device     Physical:   Patient demonstrates appropriate mood and affect. Left lower extremity exam:   The incisions are well-healed and are clean, dry, intact, and nontender with no erythema. There is no drainage. They have no edema, the Arm compartments are soft . There are No cords or calf tenderness. No significant calf/ankle edema. They are neurovascularly intact distally. Minimal effusion. Range of motion of the right knee demonstrates full painless range of motion without complaint    No areas of tenderness to palpation      Imaging:   No new orthopedic imaging      CRP on a downward trend, 7.6 yesterday 11-29 versus 9.2 on 11-28    Knee aspiration from emergency room demonstrates cell count of 630 with 210,000 RBCs.   No crystals seen. Protein and glucose numbers were normal.  No growth to date     Impression: 2 months postop left knee irrigation debridement for septic effusion    Plan:   -I again provided reassurance to the patient that there is no concerning findings on exam today and that the recent aspiration results of the left knee were negative for any concerning findings. I explained that the swelling in the knee is her body's natural fluid that is responding to irritation of the knee  -continue the PT protocol   -Patient is weight-bear as tolerated, range of motion as tolerated. Patient can continue to soak incisions as they are well-healed  -Continue knee sleeve for compression to help with any type of effusion in the swelling.  -rest/elevation as needed  -DVT prophylaxis: Defer to PCP or hospitalist but I do feel that she needs to be on something with her history  -f/u in with me in office in 8 weeks  -f/u sooner prn any issues   -No further orthopedic intervention plan at this time but please contact me if there is any issues regarding the patient. My office will call to schedule a follow-up appointment in 2 months.

## 2022-11-30 NOTE — PROGRESS NOTES
Infectious Disease Progress Note  2022   Patient Name: Parminder Sharp : 1970     Assessment  Left knee hemarthrosis  Continues to have left knee swelling  Hx of positive rheumatoid factor  Morbid obesity  Comorbid conditions:      Plan  Therapeutic: does  not require abx  Diagnostic:KOBY, citrullinated peptide, RF  F/u:  Other: Advise hematology consult to evaluate for vWD or antiphospholipid syndrome      Reason for visit: F/u Left knee hemarthrosis? History:? Interval history noted  Denies n/v/d/f or untoward effects of antimicrobials  Left knee swelling persists  Physical Exam:  Vital Signs: /71   Pulse 61   Temp 97.7 °F (36.5 °C) (Oral)   Resp 16   Ht 5' 4\" (1.626 m)   Wt 240 lb 15.4 oz (109.3 kg)   SpO2 93%   BMI 41.36 kg/m²     Gen: alert and oriented X3, no distress  Skin: no stigmata of endocarditis; livedo reticularis of both upper extremities  Wounds: C/D/I  HEMT: AT/NC Oropharynx pink, moist, and without lesions or exudates;   Eyes: PERRLA, EOMI, conjunctiva pink, sclera anicteric. Neck: Supple. Trachea midline. No LAD. Chest: no distress and CTA. Good air movement. Heart: RRR and no MRG. Abd: soft, non-distended, no tenderness, no hepatomegaly. Normoactive bowel sounds. Ext: no clubbing, cyanosis, or edema  Catheter Site: without erythema or tenderness  LDA: Left anterior chest Mediport  Neuro: Mental status intact. CN 2-12 intact and no focal sensory or motor deficits     Radiologic / Imaging / TESTING  No results found.      Labs:    Recent Results (from the past 24 hour(s))   ANEMIA PANEL    Collection Time: 22 10:15 AM   Result Value Ref Range    WBC 3.0 (L) 4.0 - 10.5 K/CU MM    RBC 3.34 (L) 4.2 - 5.4 M/CU MM    Hemoglobin 8.4 (L) 12.5 - 16.0 GM/DL    Hematocrit 28.0 (L) 37 - 47 %    MCV 83.8 78 - 100 FL    MCH 25.1 (L) 27 - 31 PG    MCHC 30.0 (L) 32.0 - 36.0 %    RDW 13.3 11.7 - 14.9 %    Platelets 222 (L) 704 - 440 K/CU MM    MPV 10.8 7.5 - 11.1 FL Differential Type AUTOMATED DIFFERENTIAL     Segs Relative 46.1 36 - 66 %    Lymphocytes % 38.9 24 - 44 %    Monocytes % 7.0 (H) 0 - 4 %    Eosinophils % 7.0 (H) 0 - 3 %    Basophils % 0.7 0 - 1 %    Segs Absolute 1.4 K/CU MM    Lymphocytes Absolute 1.2 K/CU MM    Monocytes Absolute 0.2 K/CU MM    Eosinophils Absolute 0.2 K/CU MM    Basophils Absolute 0.0 K/CU MM    Nucleated RBC % 0.0 %    Total Nucleated RBC 0.0 K/CU MM    Total Immature Neutrophil 0.01 K/CU MM    Immature Neutrophil % 0.3 0 - 0.43 %    Vitamin B-12 1869 (H) 211 - 911 pg/ml    Folate 10.4 3.1 - 17.5 NG/ML    Iron 25 (L) 37 - 145 ug/dL    UIBC 242 110 - 370 ug/dL    TIBC 267 250 - 450 ug/dL    Transferrin % 9 (L) 10 - 44 %    Retic Ct Pct 1.0 0.2 - 2.20 %    Ferritin 32 15 - 150 NG/ML   Lactic Acid    Collection Time: 11/29/22 10:15 AM   Result Value Ref Range    Lactate 0.9 0.4 - 2.0 mMOL/L   C-Reactive Protein    Collection Time: 11/29/22 10:15 AM   Result Value Ref Range    CRP High Sensitivity 7.6 (H) <5.0 mg/L   Sedimentation Rate    Collection Time: 11/29/22 10:15 AM   Result Value Ref Range    Sed Rate 16 0 - 30 MM/HR   Blood occult stool #1    Collection Time: 11/29/22 10:41 AM   Result Value Ref Range    Occult Blood Fecal NEGATIVE NEGATIVE   CBC with Auto Differential    Collection Time: 11/30/22  6:00 AM   Result Value Ref Range    WBC 3.4 (L) 4.0 - 10.5 K/CU MM    RBC 3.42 (L) 4.2 - 5.4 M/CU MM    Hemoglobin 8.6 (L) 12.5 - 16.0 GM/DL    Hematocrit 28.4 (L) 37 - 47 %    MCV 83.0 78 - 100 FL    MCH 25.1 (L) 27 - 31 PG    MCHC 30.3 (L) 32.0 - 36.0 %    RDW 13.3 11.7 - 14.9 %    Platelets 320 546 - 869 K/CU MM    MPV 10.8 7.5 - 11.1 FL    Differential Type AUTOMATED DIFFERENTIAL     Segs Relative 45.1 36 - 66 %    Lymphocytes % 38.3 24 - 44 %    Monocytes % 8.0 (H) 0 - 4 %    Eosinophils % 7.7 (H) 0 - 3 %    Basophils % 0.9 0 - 1 %    Segs Absolute 1.5 K/CU MM    Lymphocytes Absolute 1.3 K/CU MM    Monocytes Absolute 0.3 K/CU MM Eosinophils Absolute 0.3 K/CU MM    Basophils Absolute 0.0 K/CU MM    Nucleated RBC % 0.0 %    Total Nucleated RBC 0.0 K/CU MM    Total Immature Neutrophil 0.00 K/CU MM    Immature Neutrophil % 0.0 0 - 0.43 %     CULTURE results: Invalid input(s): BLOOD CULTURE,  URINE CULTURE, SURGICAL CULTURE    Diagnosis:  Patient Active Problem List   Diagnosis    Fever    Fibromyalgia    Lupus (systemic lupus erythematosus) (HCC)    Chronic pancreatitis (HCC)    HTN (hypertension)    Frequent UTI    Gastroesophageal reflux disease without esophagitis    Chronic depression    Fatty liver disease, nonalcoholic    Arthritis    Bilateral low back pain with left-sided sciatica    Hiatal hernia    Chronic pain syndrome    Drug-seeking/Aberrant behavior    Receiving intravenous antibiotic treatment as outpatient    Lymph node disorder    Morbid obesity with BMI of 40.0-44.9, adult ([address]    Iron deficiency anemia    History of Jet-en-Y gastric bypass    Anxiety    RUQ pain    Discoloration of skin of flank resembling ecchymosis    Chest pain of uncertain etiology    Cellulitis of left thigh    Malabsorption    COPD (chronic obstructive pulmonary disease) ([facility])    Nausea    Hypothyroidism due to acquired atrophy of thyroid    Vitamin D deficiency    Irritable bowel syndrome    Malabsorption due to intolerance, not elsewhere classified    Port-A-Cath in place    Pseudoseizures [facility])    Diarrhea    Myalgia    Acute bilateral deep vein thrombosis (DVT) of femoral veins ([facility])    Acute deep vein thrombosis (DVT) of popliteal vein of left lower extremity ([facility])    Swelling of joint of left knee    Septic arthritis of knee, left (HCC)    Hemarthrosis of left knee    Ileus ([facility])    Acute pain of left knee    Hemarthrosis involving knee joint, left    Epigastric pain       Active Problems  Principal Problem:    Ileus ([facility])  Active Problems:    Acute pain of left knee    Hemarthrosis involving knee joint, left    Epigastric pain  Resolved Problems:    * No resolved hospital problems.  *              Electronically signed by: Electronically signed by Massiel Rey MD on 11/30/2022 at 9:43 AM

## 2022-11-30 NOTE — PROGRESS NOTES
Hospitalist Progress Note      Name:  Linda Nichols /Age/Sex: 1970  (45 y.o. female)   MRN & CSN:  [id number] & [id number] Admission Date/Time: 2022  9:33 AM   Location:  [address] [place] [address] PCP: Rodolfo Duran MD         Hospital Day: 5                                               Attending Physician Dr Herminia Rosas and Plan:   Linda Nichols is a 45 y.o.  female  who presents with Ileus ([address]    Present on Admission:   Ileus ([address]   Acute pain of left knee   Hemarthrosis involving knee joint, left   Epigastric pain      Abdominal pain   Illeus-resolving   US () Mildly dilated common bile duct measuring up to 0.8 cm likely secondary to cholecystectomy changes. Pain reported as uncontrolled   Patient having bowel movement/flatus   Able to tolerate oral intake   PRN pain control-discussion regarding weaning IV medications to oral    General surgery following-signed off today   Gastroenterology following-we will plan for EGD/colonoscopy      Left knee pain   Hemarthrosis     Ortho following with no intervention [id number] outpatient   Recent admission - for left knee septic arthritis completed extended course of IV Rocephin   ID following-low suspicion for infectious process but likely autoimmune etiology      Anemia acute on chronic   Recent transfusion during previous admission    H/H 8.6/28.4 declining trend   Borderline pancytopenia: WBC 3.4 platelets 791   Consulted hematology/Oncology     Pseudoseizure-continue Keppra     Lupus   Rheumatoid arthritis   Recent concern for flare during previous admission and discharged on prednisone taper   Follows with rheumatology outpatient     Obesity- Body mass index is 41.36 kg/m². Lifestyle modifications needed      Diet ADULT DIET;  Regular   DVT Prophylaxis [x] Lovenox, []  Heparin, [] SCDs, [] Ambulation   GI Prophylaxis [x] PPI,  [] H2 Blocker,  [] Carafate,  [] Diet/Tube Feeds   Code Status Full Code     -Patient assessment and plan discussed with supervising physician-  Subjective 11/30/2022     Bárbara Garcia is a 45 y.o.  female, who presented with  Abdominal Pain (x1 week w/nausea ) and Knee Pain (Left knee x1 week (hx of blood clots))          Ten point ROS reviewed negative, unless as noted above    Objective: Intake/Output Summary (Last 24 hours) at 11/30/2022 1344  Last data filed at 11/30/2022 0942  Gross per 24 hour   Intake 260 ml   Output --   Net 260 ml      Vitals:   Vitals:    11/30/22 0417 11/30/22 0915 11/30/22 0955 11/30/22 1328   BP:  115/71     Pulse:  61     Resp:  16 16 16   Temp:  97.7 °F (36.5 °C)     TempSrc:  Oral     SpO2:  93%     Weight: 240 lb 15.4 oz (109.3 kg)      Height:         Physical Exam: 11/30/22     GEN -Awake nontoxic appearing female, sitting upright in bed , NAD. Morbidly obese body habitus. Appears given age. EYES -Pupils are equally round. No scleral erythema, discharge, or conjunctivitis. HENT -MM are moist. Oral pharynx without exudates, no evidence of thrush. NECK -Supple, no apparent thyromegaly or masses. RESP -CTA, no wheezes, rales or rhonchi. Symmetric chest movement while on room air. C/V -S1/S2 auscultated. RRR without appreciable M/R/G. No JVD or carotid bruits. Peripheral pulses equal bilaterally and palpable. No peripheral edema. GI -Abdomen is soft non distended and without significant tenderness. No masses or guarding. + BS Rectal exam deferred.  -No CVA tenderness. Ryder catheter is not present. LYMPH-No palpable cervical lymphadenopathy and no hepatosplenomegaly. No petechiae or ecchymoses. MS -No gross joint deformities. Left knee diffusely edematous TTP medial aspect of patella  SKIN -Normal coloration, warm, dry. NEURO-Cranial nerves appear grossly intact, normal speech, no lateralizing weakness. PSYC-Awake, alert, oriented x 4. Appropriate affect.     Medications:   Medications:    iron sucrose  100 mg IntraVENous Daily polyethylene glycol  17 g Oral Daily    hydrOXYzine HCl  50 mg Oral BID    levETIRAcetam  1,000 mg Oral BID    levothyroxine  125 mcg Oral QAM AC    pantoprazole  40 mg Oral QAM AC    PARoxetine  40 mg Oral Daily    sodium chloride flush  5-40 mL IntraVENous 2 times per day    enoxaparin  30 mg SubCUTAneous BID    gabapentin  800 mg Oral TID    estradiol  0.5 mg Oral Daily      Infusions:    sodium chloride      sodium chloride 1,000 mL (11/28/22 2042)     PRN Meds: morphine, 2 mg, Q6H PRN  polyvinyl alcohol, 1 drop, Q2H PRN  promethazine, 6.25 mg, Q6H PRN  bisacodyl, 10 mg, Daily PRN  heparin flush, 100 Units, PRN  albuterol, 2.5 mg, Q6H PRN  oxyCODONE-acetaminophen, 1 tablet, Q6H PRN  sodium chloride flush, 10 mL, PRN  sodium chloride, 500 mL, PRN  ondansetron, 4 mg, Q8H PRN   Or  ondansetron, 4 mg, Q6H PRN  nicotine polacrilex, 2 mg, Q1H PRN  acetaminophen, 650 mg, Q6H PRN   Or  acetaminophen, 650 mg, Q6H PRN  tiZANidine, 4 mg, Q8H PRN        LABS  CBC   Recent Labs     11/29/22  0640 11/29/22  1015 11/30/22  0600   WBC 3.5* 3.0* 3.4*   HGB 8.8* 8.4* 8.6*   HCT 29.5* 28.0* 28.4*    137* 154      RENAL  Recent Labs     11/28/22  0253 11/29/22  0640    139   K 4.5 4.2    107   CO2 25 23   BUN 11 8   CREATININE 0.6 0.7     LFT'S  No results for input(s): AST, ALT, ALB, BILIDIR, BILITOT, ALKPHOS in the last 72 hours. COAG  No results for input(s): INR in the last 72 hours. CARDIAC ENZYMES  No results for input(s): CKTOTAL, CKMB, CKMBINDEX, TROPONINI in the last 72 hours. Radiology this visit:  Reviewed. XR KNEE LEFT (MIN 4 VIEWS)    Result Date: 11/26/2022  EXAMINATION: FOUR XRAY VIEWS OF THE LEFT KNEE 11/26/2022 10:33 am COMPARISON: None.  HISTORY: ORDERING SYSTEM PROVIDED HISTORY: Fall, left knee pain TECHNOLOGIST PROVIDED HISTORY: Reason for exam:->Fall, left knee pain Reason for Exam: Fall, left knee pain Additional signs and symptoms: Fall, left knee pain Relevant Medical/Surgical loop of small bowel is seen in the left mid abdomen. Mild to moderate stool burden is seen in the colon. Pelvis: The urinary bladder is unremarkable. Peritoneum/Retroperitoneum: No lymphadenopathy is seen. No intraperitoneal free air or significant free fluid. Bones/Soft Tissues: No acute bony abnormality is seen. Short segment mildly dilated loop of small bowel in the left mid abdomen. This is more prominent than the prior CT. This could represent an ileus or low-grade obstruction. 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 Again seen is mild distension of the gastric pouch with ingested contents. Prior cholecystectomy. Again seen is pneumobilia and mild prominence of the common bile duct. US ABDOMEN LIMITED Specify organ? LIVER, GALLBLADDER, PANCREAS    Result Date: 11/30/2022  EXAMINATION: RIGHT UPPER QUADRANT ULTRASOUND 11/30/2022 8:19 am COMPARISON: CT abdomen and pelvis 1126 22 HISTORY: ORDERING SYSTEM PROVIDED HISTORY: Epigastric and ruq pain, mild distension of common bile duct seen on CT scan TECHNOLOGIST PROVIDED HISTORY: Reason for exam:->Epigastric and ruq pain, mild distension of common bile duct seen on CT scan Specify organ?->LIVER Specify organ?->GALLBLADDER Specify organ?->PANCREAS FINDINGS: LIVER:  The liver demonstrates normal echogenicity without evidence of intrahepatic biliary ductal dilatation. BILIARY SYSTEM:  Status post cholecystectomy. Common bile duct is within normal limits measuring 0.8 cm. RIGHT KIDNEY: The right kidney is grossly unremarkable without evidence of hydronephrosis. The right kidney measures 11 x 5.1 x 5 cm. PANCREAS:  Visualized portions of the pancreas are unremarkable. OTHER: No evidence of right upper quadrant ascites. Mildly dilated common bile duct measuring up to 0.8 cm likely secondary to cholecystectomy changes.      US EXTREMITY LEFT NON VASC LIMITED    Result Date: 11/26/2022  EXAMINATION: NONVASCULAR ULTRASOUND OF THE LEFT EXTREMITY; VENOUS ULTRASOUND OF THE LEFT EXTREMITY 11/26/2022 10:45 am COMPARISON: 11/02/2022 09/19/2022 10/11/2022 HISTORY: ORDERING SYSTEM PROVIDED HISTORY: knee pain TECHNOLOGIST PROVIDED HISTORY: Reason for exam:->knee pain FINDINGS: Focused ultrasound demonstrates a complex collection in the suprapatellar region, measuring 9.4 x 5.8 x 8.5 cm. This has been described on prior studies. .  It appears increased in size compared to 09/19, but decreased in size in its greatest dimension compared to 10/11 Visualized veins are patent and free of thrombus. No DVT identified. Persistent complex collection in the suprapatellar region. This may relate to prior surgery or trauma. Correlate for signs of infection or bursitis. No DVT noted     VL DUP LOWER EXTREMITY VENOUS LEFT    Result Date: 11/26/2022  EXAMINATION: NONVASCULAR ULTRASOUND OF THE LEFT EXTREMITY; VENOUS ULTRASOUND OF THE LEFT EXTREMITY 11/26/2022 10:45 am COMPARISON: 11/02/2022 09/19/2022 10/11/2022 HISTORY: ORDERING SYSTEM PROVIDED HISTORY: knee pain TECHNOLOGIST PROVIDED HISTORY: Reason for exam:->knee pain FINDINGS: Focused ultrasound demonstrates a complex collection in the suprapatellar region, measuring 9.4 x 5.8 x 8.5 cm. This has been described on prior studies. .  It appears increased in size compared to 09/19, but decreased in size in its greatest dimension compared to 10/11 Visualized veins are patent and free of thrombus. No DVT identified. Persistent complex collection in the suprapatellar region. This may relate to prior surgery or trauma. Correlate for signs of infection or bursitis. No DVT noted     VL LOWER EXTREMITY VENOUS LEFT    Result Date: 11/2/2022  EXAMINATION: DUPLEX VENOUS ULTRASOUND OF THE LEFT LOWER EXTREMITY 11/2/2022 4:07 pm TECHNIQUE: Duplex ultrasound using B-mode/gray scaled imaging and Doppler spectral analysis and color flow was obtained of the deep venous structures of the left extremity.  COMPARISON: October 11, 2022 HISTORY: ORDERING SYSTEM PROVIDED HISTORY: Pain and swelling of left lower leg TECHNOLOGIST PROVIDED HISTORY: Reason for exam:->Possible DVT FINDINGS: The visualized veins of the left lower extremity are patent and free of echogenic thrombus. The veins demonstrate good compressibility with normal color flow study and spectral analysis. Evidence of knee effusion. No evidence of DVT in the left lower extremity.              Electronically signed by MICHAEL Quiñonez CNP on 11/30/2022 at 1:44 PM

## 2022-11-30 NOTE — PROGRESS NOTES
General Surgery-Dr. Mendel Hawking Day: 5    Chief Complaint on Admission: possible ileus      Subjective:     Diet advanced to regular food. Tolerated small portions. Had soft regular BM.  +ambulating. Seen by GI and ID. Still with LEFT knee pressure. ROS:  Review of Systems   HENT: Negative. Eyes: Negative. Respiratory: Negative. Cardiovascular: Negative. Gastrointestinal:  Negative for abdominal pain and nausea. Endocrine: Negative. Genitourinary: Negative. Musculoskeletal:  Positive for arthralgias, back pain and joint swelling. Skin:  Negative for color change. Allergic/Immunologic: Negative. Hematological: Negative. All other systems reviewed and are negative. Allergies  Aspirin, Compazine [prochlorperazine], Shellfish-derived products, Toradol [ketorolac tromethamine], Haldol [haloperidol], Asa [aspirin], Compazine [prochlorperazine], Haldol [haloperidol], Reglan [metoclopramide], Reglan [metoclopramide], and Toradol [ketorolac tromethamine]          Diagnosis Date    Acid reflux     Anemia     Anxiety     Arthritis     Hands, Back And Ankles    Arthritis     Bleeding ulcer 2014    \"I Had Ulcers In My Stomach And Colon\"/ per pt on 8/12/2019\"they said recently having some blood in my stomach- in July ( 2019)could not find where coming from \"    Bronchitis Last Episode 2014    Chronic back pain     Chronic pain     [facility]    COPD (chronic obstructive pulmonary disease) ([facility]     Sees Dr. Juan Palma    Depression     Disease of blood and blood forming organ     Fibromyalgia Dx 2013    GERD (gastroesophageal reflux disease)     H/O echocardiogram 08/11/2015    EF >55%. LA to be at the upper limit of normal in size. LV hypertrophy with normal LV systolic, but abnormal diastolic function. Normal valvular structures and function.      H/O echocardiogram 08/30/2018    EF 55-60%    Hiatal hernia     History of blood transfusion 09/2015 And     No Reaction To Blood Transfusions Received    History of sleeve gastrectomy     [place] (hard of hearing)     Right Ear    Hx of cardiac catheterization 10/24/2010    Angiographically normal coronary arteries w/ normal LV function and wall motion. Hx of transesophageal echocardiography (PILO) for monitoring 2010    EF 55-60%. WNL. HX OTHER MEDICAL     per old chart hx of sepsis and dx left 5th metatarsal MSSA osteomylitis- consult with Dr Ulises Zhao     \"very difficulty IV stick- had mediport infection in the past- then put picc line in and removed 2019 now going to put new mediport in\"( 8/15/2019)    Hypertension     follow with Dr ? name    Hypertension     Immune deficiency disorder ([address]     Kidney cysts     \"they found that I have a kidney cyst but not sure which side\" per pt on 7/15/2020    Kidney stone     Lupus ([address] Dx     \"Rheumatoid Lupus\"    MDRO (multiple drug resistant organisms) resistance     4 months ago chest from mediport    Morbid obesity ([address]     MRSA bacteremia     Nausea     \"Most Of The Time\"    Osteomyelitis of fifth toe of left foot (HCC)     Pain management     Sees Dr. Robb Scott, Once A Month    Pancreatitis chronic Dx     Pneumonia Dx 11-15    Seizures ([address]     Shortness of breath on exertion     Shortness of breath on exertion     Sleep apnea     Has CPAP\"no longer use the cpap since lost weight\"    Staph infection Dx 11-15    Left Foot    Staph infection Dx 11-15    \"Left Foot\"    Teeth missing     Upper And Lower    Thyroid disease     Wears glasses     To Read       Objective:     Vitals:    22 0232   BP: 125/74   Pulse: 58   Resp:    Temp: 98.8 °F (37.1 °C)   SpO2: 96%       TEMPERATURE:  Current -Temp: 98.8 °F (37.1 °C); Max - Temp  Av.6 °F (37 °C)  Min: 98.4 °F (36.9 °C)  Max: 98.8 °F (37.1 °C)    No intake/output data recorded. I/O last 3 completed shifts:   In: 467 [P.O.:870]  Out: -       Physical Exam:  Physical Exam  Vitals reviewed. Constitutional:       General: She is not in acute distress. Appearance: She is obese. Comments: Sitting up in bed, eating. HENT:      Head: Normocephalic and atraumatic. Right Ear: External ear normal.      Left Ear: External ear normal.   Eyes:      General:         Right eye: No discharge. Left eye: No discharge. Extraocular Movements: Extraocular movements intact. Cardiovascular:      Rate and Rhythm: Normal rate. Pulmonary:      Effort: No respiratory distress. Abdominal:      Palpations: Abdomen is soft. Tenderness: There is no abdominal tenderness. There is no guarding or rebound. Musculoskeletal:         General: No swelling. Skin:     General: Skin is warm. Neurological:      General: No focal deficit present. Mental Status: She is alert.    Psychiatric:         Mood and Affect: Mood normal.         Scheduled Meds:   iron sucrose  100 mg IntraVENous Daily    polyethylene glycol  17 g Oral Daily    hydrOXYzine HCl  50 mg Oral BID    levETIRAcetam  1,000 mg Oral BID    levothyroxine  125 mcg Oral QAM AC    pantoprazole  40 mg Oral QAM AC    PARoxetine  40 mg Oral Daily    sodium chloride flush  5-40 mL IntraVENous 2 times per day    enoxaparin  30 mg SubCUTAneous BID    gabapentin  800 mg Oral TID    estradiol  0.5 mg Oral Daily     ContinuousInfusions:   sodium chloride      sodium chloride 1,000 mL (11/28/22 2042)     PRN Meds:morphine, polyvinyl alcohol, promethazine, bisacodyl, heparin flush, albuterol, oxyCODONE-acetaminophen, sodium chloride flush, sodium chloride, ondansetron **OR** ondansetron, nicotine polacrilex, acetaminophen **OR** acetaminophen, tiZANidine      Labs/Imaging Results:   Lab Results   Component Value Date    WBC 3.4 (L) 11/30/2022    HGB 8.6 (L) 11/30/2022    HCT 28.4 (L) 11/30/2022    MCV 83.0 11/30/2022     11/30/2022     Lab Results   Component Value Date     11/29/2022    K 4.2 11/29/2022     11/29/2022    CO2 23 11/29/2022    BUN 8 11/29/2022    CREATININE 0.7 11/29/2022    GLUCOSE 103 (H) 11/29/2022    CALCIUM 9.5 11/29/2022    PROT 6.5 11/26/2022    LABALBU 3.9 11/26/2022    BILITOT 0.2 11/26/2022    ALKPHOS 107 11/26/2022    AST 13 (L) 11/26/2022    ALT 11 11/26/2022    LABGLOM >60 11/29/2022    GFRAA >60 10/11/2022     GI Panel - unremarkable. Assessment:       46 y/o F with L knee pain and concern for possible ileus, resolving    Plan:     -Reviewed GI and ID consults and d/w pt. Appreciated.   -Abdominal symptoms improving and stable. Continue regular diet.   -Abdominal exam remains benign.   -Main complaint at this time still knee pain. F/u with Ortho recs. Pt states she has not yet been seen by Dr. Greg Zamora. May need re-consulted.   -No acute intervention planned from Gen Surg standpoint at this time. Ok to d/c from my standpoint once cleared by primary team and al other consultants.          Electronically signed by Terri Buerger, II, MD on 11/30/2022 at 7:27 AM

## 2022-11-30 NOTE — CARE COORDINATION
Chart reviewed, patient discussed in IDR. Plan remains home with no needs. Waiting on several specialty consults. CM following for any new discharge needs that may arise.

## 2022-12-01 ENCOUNTER — APPOINTMENT (OUTPATIENT)
Dept: GENERAL RADIOLOGY | Age: 52
End: 2022-12-01
Payer: COMMERCIAL

## 2022-12-01 LAB
ALBUMIN SERPL-MCNC: 4.1 GM/DL (ref 3.4–5)
ALBUMIN SERPL-MCNC: 4.3 GM/DL (ref 3.4–5)
ALP BLD-CCNC: 122 IU/L (ref 40–129)
ALP BLD-CCNC: 132 IU/L (ref 40–128)
ALT SERPL-CCNC: 12 U/L (ref 10–40)
ALT SERPL-CCNC: 12 U/L (ref 10–40)
ANION GAP SERPL CALCULATED.3IONS-SCNC: 9 MMOL/L (ref 4–16)
ANION GAP SERPL CALCULATED.3IONS-SCNC: 9 MMOL/L (ref 4–16)
APTT: 37.7 SECONDS (ref 25.1–37.1)
AST SERPL-CCNC: 15 IU/L (ref 15–37)
AST SERPL-CCNC: 16 IU/L (ref 15–37)
BASOPHILS ABSOLUTE: 0 K/CU MM
BASOPHILS ABSOLUTE: 0 K/CU MM
BASOPHILS RELATIVE PERCENT: 0.8 % (ref 0–1)
BASOPHILS RELATIVE PERCENT: 0.9 % (ref 0–1)
BILIRUB SERPL-MCNC: 0.1 MG/DL (ref 0–1)
BILIRUB SERPL-MCNC: 0.3 MG/DL (ref 0–1)
BUN BLDV-MCNC: 10 MG/DL (ref 6–23)
BUN BLDV-MCNC: 8 MG/DL (ref 6–23)
CALCIUM SERPL-MCNC: 10.1 MG/DL (ref 8.3–10.6)
CALCIUM SERPL-MCNC: 10.2 MG/DL (ref 8.3–10.6)
CHLORIDE BLD-SCNC: 105 MMOL/L (ref 99–110)
CHLORIDE BLD-SCNC: 107 MMOL/L (ref 99–110)
CO2: 26 MMOL/L (ref 21–32)
CO2: 27 MMOL/L (ref 21–32)
CREAT SERPL-MCNC: 0.7 MG/DL (ref 0.6–1.1)
CREAT SERPL-MCNC: 0.8 MG/DL (ref 0.6–1.1)
CULTURE: NORMAL
DIFFERENTIAL TYPE: ABNORMAL
DIFFERENTIAL TYPE: ABNORMAL
EOSINOPHILS ABSOLUTE: 0.3 K/CU MM
EOSINOPHILS ABSOLUTE: 0.3 K/CU MM
EOSINOPHILS RELATIVE PERCENT: 5.7 % (ref 0–3)
EOSINOPHILS RELATIVE PERCENT: 7.4 % (ref 0–3)
GFR SERPL CREATININE-BSD FRML MDRD: >60 ML/MIN/1.73M2
GFR SERPL CREATININE-BSD FRML MDRD: >60 ML/MIN/1.73M2
GLUCOSE BLD-MCNC: 109 MG/DL (ref 70–99)
GLUCOSE BLD-MCNC: 88 MG/DL (ref 70–99)
GLUCOSE BLD-MCNC: 97 MG/DL (ref 70–99)
HCT VFR BLD CALC: 32.8 % (ref 37–47)
HCT VFR BLD CALC: 33.3 % (ref 37–47)
HEMOGLOBIN: 10.2 GM/DL (ref 12.5–16)
HEMOGLOBIN: 10.4 GM/DL (ref 12.5–16)
IMMATURE NEUTROPHIL %: 0 % (ref 0–0.43)
IMMATURE NEUTROPHIL %: 0.3 % (ref 0–0.43)
INR BLD: 0.89 INDEX
LACTATE: 1.6 MMOL/L (ref 0.4–2)
LYMPHOCYTES ABSOLUTE: 1.1 K/CU MM
LYMPHOCYTES ABSOLUTE: 1.2 K/CU MM
LYMPHOCYTES RELATIVE PERCENT: 26.3 % (ref 24–44)
LYMPHOCYTES RELATIVE PERCENT: 29.5 % (ref 24–44)
Lab: NORMAL
MCH RBC QN AUTO: 25.2 PG (ref 27–31)
MCH RBC QN AUTO: 25.5 PG (ref 27–31)
MCHC RBC AUTO-ENTMCNC: 31.1 % (ref 32–36)
MCHC RBC AUTO-ENTMCNC: 31.2 % (ref 32–36)
MCV RBC AUTO: 81 FL (ref 78–100)
MCV RBC AUTO: 81.6 FL (ref 78–100)
MONOCYTES ABSOLUTE: 0.2 K/CU MM
MONOCYTES ABSOLUTE: 0.3 K/CU MM
MONOCYTES RELATIVE PERCENT: 6.1 % (ref 0–4)
MONOCYTES RELATIVE PERCENT: 7.3 % (ref 0–4)
NUCLEATED RBC %: 0 %
NUCLEATED RBC %: 0 %
PDW BLD-RTO: 13.3 % (ref 11.7–14.9)
PDW BLD-RTO: 13.3 % (ref 11.7–14.9)
PLATELET # BLD: 179 K/CU MM (ref 140–440)
PLATELET # BLD: 203 K/CU MM (ref 140–440)
PMV BLD AUTO: 10.2 FL (ref 7.5–11.1)
PMV BLD AUTO: 10.5 FL (ref 7.5–11.1)
POTASSIUM SERPL-SCNC: 4 MMOL/L (ref 3.5–5.1)
POTASSIUM SERPL-SCNC: 4.5 MMOL/L (ref 3.5–5.1)
PROTHROMBIN TIME: 11.5 SECONDS (ref 11.7–14.5)
RBC # BLD: 4.05 M/CU MM (ref 4.2–5.4)
RBC # BLD: 4.08 M/CU MM (ref 4.2–5.4)
SEGMENTED NEUTROPHILS ABSOLUTE COUNT: 2.1 K/CU MM
SEGMENTED NEUTROPHILS ABSOLUTE COUNT: 2.6 K/CU MM
SEGMENTED NEUTROPHILS RELATIVE PERCENT: 55.9 % (ref 36–66)
SEGMENTED NEUTROPHILS RELATIVE PERCENT: 59.8 % (ref 36–66)
SODIUM BLD-SCNC: 140 MMOL/L (ref 135–145)
SODIUM BLD-SCNC: 143 MMOL/L (ref 135–145)
SPECIMEN: NORMAL
TOTAL CK: 46 IU/L (ref 26–140)
TOTAL IMMATURE NEUTOROPHIL: 0 K/CU MM
TOTAL IMMATURE NEUTOROPHIL: 0.01 K/CU MM
TOTAL NUCLEATED RBC: 0 K/CU MM
TOTAL NUCLEATED RBC: 0 K/CU MM
TOTAL PROTEIN: 6.4 GM/DL (ref 6.4–8.2)
TOTAL PROTEIN: 6.6 GM/DL (ref 6.4–8.2)
WBC # BLD: 3.8 K/CU MM (ref 4–10.5)
WBC # BLD: 4.4 K/CU MM (ref 4–10.5)

## 2022-12-01 PROCEDURE — 82607 VITAMIN B-12: CPT

## 2022-12-01 PROCEDURE — 36415 COLL VENOUS BLD VENIPUNCTURE: CPT

## 2022-12-01 PROCEDURE — 99233 SBSQ HOSP IP/OBS HIGH 50: CPT | Performed by: SURGERY

## 2022-12-01 PROCEDURE — 6360000002 HC RX W HCPCS: Performed by: NURSE PRACTITIONER

## 2022-12-01 PROCEDURE — 6370000000 HC RX 637 (ALT 250 FOR IP): Performed by: NURSE PRACTITIONER

## 2022-12-01 PROCEDURE — 82746 ASSAY OF FOLIC ACID SERUM: CPT

## 2022-12-01 PROCEDURE — 99222 1ST HOSP IP/OBS MODERATE 55: CPT | Performed by: INTERNAL MEDICINE

## 2022-12-01 PROCEDURE — 85245 CLOT FACTOR VIII VW RISTOCTN: CPT

## 2022-12-01 PROCEDURE — 2580000003 HC RX 258: Performed by: NURSE PRACTITIONER

## 2022-12-01 PROCEDURE — 1200000000 HC SEMI PRIVATE

## 2022-12-01 PROCEDURE — 85730 THROMBOPLASTIN TIME PARTIAL: CPT

## 2022-12-01 PROCEDURE — 71045 X-RAY EXAM CHEST 1 VIEW: CPT

## 2022-12-01 PROCEDURE — 84425 ASSAY OF VITAMIN B-1: CPT

## 2022-12-01 PROCEDURE — 99232 SBSQ HOSP IP/OBS MODERATE 35: CPT | Performed by: INTERNAL MEDICINE

## 2022-12-01 PROCEDURE — 85610 PROTHROMBIN TIME: CPT

## 2022-12-01 PROCEDURE — 85246 CLOT FACTOR VIII VW ANTIGEN: CPT

## 2022-12-01 PROCEDURE — 82805 BLOOD GASES W/O2 SATURATION: CPT

## 2022-12-01 PROCEDURE — 94761 N-INVAS EAR/PLS OXIMETRY MLT: CPT

## 2022-12-01 PROCEDURE — 85025 COMPLETE CBC W/AUTO DIFF WBC: CPT

## 2022-12-01 PROCEDURE — 86148 ANTI-PHOSPHOLIPID ANTIBODY: CPT

## 2022-12-01 PROCEDURE — 6370000000 HC RX 637 (ALT 250 FOR IP): Performed by: FAMILY MEDICINE

## 2022-12-01 PROCEDURE — 86147 CARDIOLIPIN ANTIBODY EA IG: CPT

## 2022-12-01 PROCEDURE — 36593 DECLOT VASCULAR DEVICE: CPT

## 2022-12-01 PROCEDURE — 82962 GLUCOSE BLOOD TEST: CPT

## 2022-12-01 PROCEDURE — 6360000002 HC RX W HCPCS: Performed by: FAMILY MEDICINE

## 2022-12-01 PROCEDURE — 36591 DRAW BLOOD OFF VENOUS DEVICE: CPT

## 2022-12-01 PROCEDURE — 6370000000 HC RX 637 (ALT 250 FOR IP): Performed by: PHYSICIAN ASSISTANT

## 2022-12-01 PROCEDURE — 82550 ASSAY OF CK (CPK): CPT

## 2022-12-01 PROCEDURE — 74018 RADEX ABDOMEN 1 VIEW: CPT

## 2022-12-01 PROCEDURE — 83605 ASSAY OF LACTIC ACID: CPT

## 2022-12-01 PROCEDURE — 85240 CLOT FACTOR VIII AHG 1 STAGE: CPT

## 2022-12-01 PROCEDURE — 84590 ASSAY OF VITAMIN A: CPT

## 2022-12-01 PROCEDURE — 82525 ASSAY OF COPPER: CPT

## 2022-12-01 PROCEDURE — 80053 COMPREHEN METABOLIC PANEL: CPT

## 2022-12-01 PROCEDURE — 84630 ASSAY OF ZINC: CPT

## 2022-12-01 PROCEDURE — 82306 VITAMIN D 25 HYDROXY: CPT

## 2022-12-01 RX ORDER — OXYCODONE HYDROCHLORIDE AND ACETAMINOPHEN 5; 325 MG/1; MG/1
2 TABLET ORAL EVERY 6 HOURS PRN
Status: DISCONTINUED | OUTPATIENT
Start: 2022-12-01 | End: 2022-12-01

## 2022-12-01 RX ORDER — OXYCODONE HYDROCHLORIDE AND ACETAMINOPHEN 5; 325 MG/1; MG/1
2 TABLET ORAL EVERY 6 HOURS PRN
Status: DISCONTINUED | OUTPATIENT
Start: 2022-12-01 | End: 2022-12-02 | Stop reason: HOSPADM

## 2022-12-01 RX ORDER — PROMETHAZINE HYDROCHLORIDE 25 MG/ML
12.5 INJECTION, SOLUTION INTRAMUSCULAR; INTRAVENOUS EVERY 6 HOURS PRN
Status: DISCONTINUED | OUTPATIENT
Start: 2022-12-01 | End: 2022-12-02 | Stop reason: HOSPADM

## 2022-12-01 RX ORDER — MORPHINE SULFATE 15 MG/1
15 TABLET, FILM COATED, EXTENDED RELEASE ORAL EVERY 12 HOURS SCHEDULED
Status: DISCONTINUED | OUTPATIENT
Start: 2022-12-01 | End: 2022-12-02 | Stop reason: HOSPADM

## 2022-12-01 RX ORDER — MORPHINE SULFATE 2 MG/ML
2 INJECTION, SOLUTION INTRAMUSCULAR; INTRAVENOUS EVERY 6 HOURS PRN
Status: DISCONTINUED | OUTPATIENT
Start: 2022-12-01 | End: 2022-12-01

## 2022-12-01 RX ADMIN — ESTRADIOL 0.5 MG: 1 TABLET ORAL at 10:18

## 2022-12-01 RX ADMIN — PROMETHAZINE HYDROCHLORIDE 12.5 MG: 25 INJECTION INTRAMUSCULAR; INTRAVENOUS at 14:10

## 2022-12-01 RX ADMIN — LEVETIRACETAM 1000 MG: 500 TABLET, FILM COATED ORAL at 20:01

## 2022-12-01 RX ADMIN — MORPHINE SULFATE 15 MG: 15 TABLET, FILM COATED, EXTENDED RELEASE ORAL at 20:01

## 2022-12-01 RX ADMIN — OXYCODONE AND ACETAMINOPHEN 1 TABLET: 5; 325 TABLET ORAL at 01:08

## 2022-12-01 RX ADMIN — LEVOTHYROXINE SODIUM 125 MCG: 0.12 TABLET ORAL at 04:12

## 2022-12-01 RX ADMIN — IRON SUCROSE 300 MG: 20 INJECTION, SOLUTION INTRAVENOUS at 13:14

## 2022-12-01 RX ADMIN — HYDROXYZINE HYDROCHLORIDE 50 MG: 50 TABLET, FILM COATED ORAL at 10:19

## 2022-12-01 RX ADMIN — PANTOPRAZOLE SODIUM 40 MG: 40 TABLET, DELAYED RELEASE ORAL at 04:12

## 2022-12-01 RX ADMIN — HYDROXYZINE HYDROCHLORIDE 50 MG: 50 TABLET, FILM COATED ORAL at 20:01

## 2022-12-01 RX ADMIN — MORPHINE SULFATE 2 MG: 2 INJECTION, SOLUTION INTRAMUSCULAR; INTRAVENOUS at 03:09

## 2022-12-01 RX ADMIN — GABAPENTIN 800 MG: 400 CAPSULE ORAL at 14:08

## 2022-12-01 RX ADMIN — MORPHINE SULFATE 2 MG: 2 INJECTION, SOLUTION INTRAMUSCULAR; INTRAVENOUS at 09:21

## 2022-12-01 RX ADMIN — OXYCODONE AND ACETAMINOPHEN 2 TABLET: 5; 325 TABLET ORAL at 14:08

## 2022-12-01 RX ADMIN — SODIUM CHLORIDE, PRESERVATIVE FREE 10 ML: 5 INJECTION INTRAVENOUS at 10:19

## 2022-12-01 RX ADMIN — OXYCODONE AND ACETAMINOPHEN 2 TABLET: 5; 325 TABLET ORAL at 20:01

## 2022-12-01 RX ADMIN — ONDANSETRON 4 MG: 2 INJECTION INTRAMUSCULAR; INTRAVENOUS at 19:30

## 2022-12-01 RX ADMIN — ALTEPLASE 2 MG: 2.2 INJECTION, POWDER, LYOPHILIZED, FOR SOLUTION INTRAVENOUS at 11:28

## 2022-12-01 RX ADMIN — ENOXAPARIN SODIUM 30 MG: 100 INJECTION SUBCUTANEOUS at 10:18

## 2022-12-01 RX ADMIN — POLYETHYLENE GLYCOL (3350) 17 G: 17 POWDER, FOR SOLUTION ORAL at 10:18

## 2022-12-01 RX ADMIN — ENOXAPARIN SODIUM 30 MG: 100 INJECTION SUBCUTANEOUS at 20:00

## 2022-12-01 RX ADMIN — SODIUM CHLORIDE, PRESERVATIVE FREE 10 ML: 5 INJECTION INTRAVENOUS at 20:05

## 2022-12-01 RX ADMIN — TIZANIDINE 4 MG: 4 TABLET ORAL at 20:01

## 2022-12-01 RX ADMIN — GABAPENTIN 800 MG: 400 CAPSULE ORAL at 10:18

## 2022-12-01 RX ADMIN — GABAPENTIN 800 MG: 400 CAPSULE ORAL at 20:01

## 2022-12-01 RX ADMIN — PAROXETINE HYDROCHLORIDE 40 MG: 20 TABLET, FILM COATED ORAL at 10:19

## 2022-12-01 RX ADMIN — LEVETIRACETAM 1000 MG: 500 TABLET, FILM COATED ORAL at 10:18

## 2022-12-01 ASSESSMENT — ENCOUNTER SYMPTOMS
COLOR CHANGE: 0
EYES NEGATIVE: 1
RESPIRATORY NEGATIVE: 1
NAUSEA: 0
ALLERGIC/IMMUNOLOGIC NEGATIVE: 1
BACK PAIN: 1
ABDOMINAL PAIN: 0

## 2022-12-01 ASSESSMENT — PAIN SCALES - WONG BAKER
WONGBAKER_NUMERICALRESPONSE: 6

## 2022-12-01 ASSESSMENT — PAIN DESCRIPTION - ONSET
ONSET: ON-GOING

## 2022-12-01 ASSESSMENT — PAIN SCALES - GENERAL
PAINLEVEL_OUTOF10: 5
PAINLEVEL_OUTOF10: 0
PAINLEVEL_OUTOF10: 6
PAINLEVEL_OUTOF10: 8
PAINLEVEL_OUTOF10: 8
PAINLEVEL_OUTOF10: 6
PAINLEVEL_OUTOF10: 7
PAINLEVEL_OUTOF10: 6

## 2022-12-01 ASSESSMENT — PAIN DESCRIPTION - FREQUENCY
FREQUENCY: CONTINUOUS

## 2022-12-01 ASSESSMENT — PAIN DESCRIPTION - LOCATION
LOCATION: ABDOMEN;LEG;KNEE
LOCATION: GENERALIZED
LOCATION: ABDOMEN;LEG;KNEE

## 2022-12-01 ASSESSMENT — PAIN - FUNCTIONAL ASSESSMENT
PAIN_FUNCTIONAL_ASSESSMENT: ACTIVITIES ARE NOT PREVENTED

## 2022-12-01 ASSESSMENT — PAIN DESCRIPTION - ORIENTATION
ORIENTATION: LEFT

## 2022-12-01 ASSESSMENT — PAIN DESCRIPTION - DESCRIPTORS
DESCRIPTORS: ACHING;BURNING
DESCRIPTORS: ACHING
DESCRIPTORS: ACHING;BURNING

## 2022-12-01 ASSESSMENT — PAIN DESCRIPTION - PAIN TYPE
TYPE: ACUTE PAIN

## 2022-12-01 NOTE — PROGRESS NOTES
General Surgery-Dr. Fred Onofre Day: 6    Chief Complaint on Admission: possible ileus      Subjective: Tolerating regular diet. Main complaint is still knee pressure. Was seen by Ortho. Pt does have some epigastric pain but it is when she eats too much. Seen by Francois this morning. Had U/S.       ROS:  Review of Systems   HENT: Negative. Eyes: Negative. Respiratory: Negative. Cardiovascular: Negative. Gastrointestinal:  Negative for abdominal pain and nausea. Endocrine: Negative. Genitourinary: Negative. Musculoskeletal:  Positive for arthralgias, back pain and joint swelling. Skin:  Negative for color change. Allergic/Immunologic: Negative. Hematological: Negative. All other systems reviewed and are negative. Allergies  Aspirin, Compazine [prochlorperazine], Shellfish-derived products, Toradol [ketorolac tromethamine], Haldol [haloperidol], Asa [aspirin], Compazine [prochlorperazine], Haldol [haloperidol], Reglan [metoclopramide], Reglan [metoclopramide], and Toradol [ketorolac tromethamine]          Diagnosis Date    Acid reflux     Anemia     Anxiety     Arthritis     Hands, Back And Ankles    Arthritis     Bleeding ulcer 2014    \"I Had Ulcers In My Stomach And Colon\"/ per pt on 8/12/2019\"they said recently having some blood in my stomach- in July ( 2019)could not find where coming from \"    Bronchitis Last Episode 2014    Chronic back pain     Chronic pain     [facility]    COPD (chronic obstructive pulmonary disease) ([facility] [address]     Sees Dr. Casa Chun    Depression     Disease of blood and blood forming organ     Fibromyalgia Dx 2013    GERD (gastroesophageal reflux disease)     H/O echocardiogram 08/11/2015    EF >55%. LA to be at the upper limit of normal in size. LV hypertrophy with normal LV systolic, but abnormal diastolic function. Normal valvular structures and function.      H/O echocardiogram 08/30/2018    EF 55-60%    Hiatal hernia     History of blood transfusion 2015 And     No Reaction To Blood Transfusions Received    History of sleeve gastrectomy     [place] (hard of hearing)     Right Ear    Hx of cardiac catheterization 10/24/2010    Angiographically normal coronary arteries w/ normal LV function and wall motion. Hx of transesophageal echocardiography (PILO) for monitoring 2010    EF 55-60%. WNL. HX OTHER MEDICAL     per old chart hx of sepsis and dx left 5th metatarsal MSSA osteomylitis- consult with Dr Jimmy Damon     \"very difficulty IV stick- had mediport infection in the past- then put picc line in and removed 2019 now going to put new mediport in\"( 8/15/2019)    Hypertension     follow with Dr ? name    Hypertension     Immune deficiency disorder ([address]     Kidney cysts     \"they found that I have a kidney cyst but not sure which side\" per pt on 7/15/2020    Kidney stone     Lupus ([address] Dx 2013    \"Rheumatoid Lupus\"    MDRO (multiple drug resistant organisms) resistance     4 months ago chest from mediport    Morbid obesity ([address]     MRSA bacteremia     Nausea     \"Most Of The Time\"    Osteomyelitis of fifth toe of left foot (HCC)     Pain management     Sees Dr. Celso Jackson, Once A Month    Pancreatitis chronic Dx     Pneumonia Dx 11-15    Seizures ([address]     Shortness of breath on exertion     Shortness of breath on exertion     Sleep apnea     Has CPAP\"no longer use the cpap since lost weight\"    Staph infection Dx 11-15    Left Foot    Staph infection Dx 11-15    \"Left Foot\"    Teeth missing     Upper And Lower    Thyroid disease     Wears glasses     To Read       Objective:     Vitals:    22 0339   BP:    Pulse:    Resp: 15   Temp:    SpO2:        TEMPERATURE:  Current -Temp: 98.5 °F (36.9 °C); Max - Temp  Av.1 °F (36.7 °C)  Min: 97.7 °F (36.5 °C)  Max: 98.5 °F (36.9 °C)    No intake/output data recorded. I/O last 3 completed shifts:   In: 10 [I.V.:10]  Out: -       Physical Exam:  Physical Exam  Vitals reviewed. Constitutional:       General: She is not in acute distress. Appearance: She is obese. Comments: Sitting up in bed, eating. HENT:      Head: Normocephalic and atraumatic. Right Ear: External ear normal.      Left Ear: External ear normal.   Eyes:      General:         Right eye: No discharge. Left eye: No discharge. Extraocular Movements: Extraocular movements intact. Cardiovascular:      Rate and Rhythm: Normal rate. Pulmonary:      Effort: No respiratory distress. Abdominal:      Palpations: Abdomen is soft. Tenderness: There is no abdominal tenderness. There is no guarding or rebound. Musculoskeletal:         General: No swelling. Skin:     General: Skin is warm. Neurological:      General: No focal deficit present. Mental Status: She is alert.    Psychiatric:         Mood and Affect: Mood normal.         Scheduled Meds:   iron sucrose  300 mg IntraVENous Daily    polyethylene glycol  17 g Oral Daily    hydrOXYzine HCl  50 mg Oral BID    levETIRAcetam  1,000 mg Oral BID    levothyroxine  125 mcg Oral QAM AC    pantoprazole  40 mg Oral QAM AC    PARoxetine  40 mg Oral Daily    sodium chloride flush  5-40 mL IntraVENous 2 times per day    enoxaparin  30 mg SubCUTAneous BID    gabapentin  800 mg Oral TID    estradiol  0.5 mg Oral Daily     ContinuousInfusions:   sodium chloride      sodium chloride 1,000 mL (11/28/22 2042)     PRN Meds:morphine, polyvinyl alcohol, promethazine, bisacodyl, heparin flush, albuterol, oxyCODONE-acetaminophen, sodium chloride flush, sodium chloride, ondansetron **OR** ondansetron, nicotine polacrilex, acetaminophen **OR** acetaminophen, tiZANidine      Labs/Imaging Results:   Lab Results   Component Value Date    WBC 3.4 (L) 11/30/2022    HGB 8.6 (L) 11/30/2022    HCT 28.4 (L) 11/30/2022    MCV 83.0 11/30/2022     11/30/2022     Lab Results   Component Value Date     11/29/2022    K 4.2 11/29/2022     11/29/2022    CO2 23 11/29/2022    BUN 8 11/29/2022    CREATININE 0.7 11/29/2022    GLUCOSE 103 (H) 11/29/2022    CALCIUM 9.5 11/29/2022    PROT 6.5 11/26/2022    LABALBU 3.9 11/26/2022    BILITOT 0.2 11/26/2022    ALKPHOS 107 11/26/2022    AST 13 (L) 11/26/2022    ALT 11 11/26/2022    LABGLOM >60 11/29/2022    GFRAA >60 10/11/2022     GI Panel - unremarkable. US:   Mildly dilated common bile duct measuring up to 0.8 cm likely secondary to   cholecystectomy changes. Assessment:       44 y/o F with L knee pain and concern for possible ileus, resolving    Plan:     -Reviewed GI and ID consults and d/w pt. Appreciated. -F/u Heme recs.   -Abdominal symptoms improving and stable. Continue regular diet. Epigastric pain seems to be related to volume of food pt eats. D/w pt that since she previously had a gastric bypass with Dr. Mykel Temple that her stomach is much smaller than typical stomach and if she overeats she will have epigastric pain as a natural reaction. She should not overeat as it will stretch her pouch. Ideally 800 liam / d or less and 60 g protein / day. -Abdominal exam remains benign. -D/w pt findings from [address] [facility] ordered by Medicine.   -Check bariatric / nutritional labs. Should be on daily multivitamin give past surgical history.          Electronically signed by Felicity Masden II, MD on 12/1/2022 at 8:36 AM

## 2022-12-01 NOTE — CONSULTS
MedPort patent /p needleset change and Cathflo instill x1h - line now returns blood briskly and flushes without any resistance/abnormalities. Sterile dressing with SkinPrep, BioPatch, and SwabCaps applied. Pt tolerated well & denies other c/o or needs. Ishan Weiss, Primary RN, notified.

## 2022-12-01 NOTE — PROGRESS NOTES
Hospitalist Progress Note      Name:  Samia Casey /Age/Sex: 1970  (45 y.o. female)   MRN & CSN:  [id number] & [id number] Admission Date/Time: 2022  9:33 AM   Location:  [address] [place] [address] PCP: Shirley Campos MD         Hospital Day: 6                                               Attending Physician Dr Aga Cordon and Plan:   Samia Casey is a 45 y.o.  female  who presents with Ileus ([address]    Present on Admission:   Ileus ([address]   Acute pain of left knee   Hemarthrosis involving knee joint, left   Epigastric pain      Abdominal pain   Illeus-resolving   US () Mildly dilated common bile duct measuring up to 0.8 cm likely secondary to cholecystectomy changes.     Pain reported as slightly improved   Patient having bowel movements/flatus   Able to tolerate oral intake- continued intermittent nausea    PRN pain control-discussion regarding weaning IV medications to oral    General surgery following - cleared for discharge    GI following - No plan EGD/colonoscopy at this time    Hopefully can discharge in next 24 hours     Left knee pain   Hemarthrosis     Ortho following with no intervention [id number] outpatient   Recent admission - for left knee septic arthritis completed extended course of IV Rocephin   ID following-low suspicion for infectious process but likely autoimmune etiology   Will need to follow-up with rheumatology outpatient     Anemia acute on chronic   Recent transfusion during previous admission   H/H10.4/33.3   Venofer x 3 doses   Borderline pancytopenia: WBC 3.8 platelets [id number] stable   Consulted hematology/Oncology for evaluation of von Willebrand's/antiphospholipid     Pseudoseizure-continue Keppra   States the seizures episode occurred with uncontrolled pain     Lupus   Rheumatoid arthritis   Recent concern for flare during previous admission and discharged on prednisone taper   Follows with rheumatology outpatient     MDD continue Paxil     Obesity- Body mass index is 41.36 kg/m². Lifestyle modifications needed      Diet ADULT DIET; Regular   DVT Prophylaxis [x] Lovenox, []  Heparin, [] SCDs, [] Ambulation   GI Prophylaxis [x] PPI,  [] H2 Blocker,  [] Carafate,  [] Diet/Tube Feeds   Code Status Full Code     -Patient assessment and plan discussed with supervising physician-  Subjective 12/1/2022     Joe Rendon is a 45 y.o.  female, who presented with  Abdominal Pain (x1 week w/nausea ) and Knee Pain (Left knee x1 week (hx of blood clots))    Patient states waxing waning pain/nausea. Discussed plan of care for the day. Discussed attempting to transition off IV pain medications to oral.  Reported multiple consultants have cleared  and hope to discharge home soon      Ten point ROS reviewed negative, unless as noted above    Objective: Intake/Output Summary (Last 24 hours) at 12/1/2022 1225  Last data filed at 12/1/2022 1019  Gross per 24 hour   Intake 10 ml   Output --   Net 10 ml        Vitals:   Vitals:    12/01/22 0309 12/01/22 0339 12/01/22 0921 12/01/22 1000   BP:    (!) 167/75   Pulse:    61   Resp: 17 15 16 16   Temp:    98.8 °F (37.1 °C)   TempSrc:    Oral   SpO2:    98%   Weight:       Height:         Physical Exam: 12/01/22     GEN -Awake nontoxic appearing female, sitting upright in bed , NAD. Morbidly obese body habitus. Appears given age. EYES -Pupils are equally round. No scleral erythema, discharge, or conjunctivitis. HENT -MM are moist. Oral pharynx without exudates, no evidence of thrush. NECK -Supple, no apparent thyromegaly or masses. RESP -CTA, no wheezes, rales or rhonchi. Symmetric chest movement while on room air. C/V -S1/S2 auscultated. RRR without appreciable M/R/G. No JVD or carotid bruits. Peripheral pulses equal bilaterally and palpable. No peripheral edema. GI -Abdomen is soft non distended and without significant tenderness. No masses or guarding. + BS Rectal exam deferred.     -No CVA tenderness. Ryder catheter is not present. LYMPH-No palpable cervical lymphadenopathy and no hepatosplenomegaly. No petechiae or ecchymoses. MS -No gross joint deformities. Left knee diffusely edematous TTP medial aspect of patella  SKIN -Normal coloration, warm, dry. Left chest wall port accessed  NEURO-Cranial nerves appear grossly intact, normal speech, no lateralizing weakness. PSYC-Awake, alert, oriented x 4. Appropriate affect.     Medications:   Medications:    iron sucrose  300 mg IntraVENous Daily    polyethylene glycol  17 g Oral Daily    hydrOXYzine HCl  50 mg Oral BID    levETIRAcetam  1,000 mg Oral BID    levothyroxine  125 mcg Oral QAM AC    pantoprazole  40 mg Oral QAM AC    PARoxetine  40 mg Oral Daily    sodium chloride flush  5-40 mL IntraVENous 2 times per day    enoxaparin  30 mg SubCUTAneous BID    gabapentin  800 mg Oral TID    estradiol  0.5 mg Oral Daily      Infusions:    sodium chloride      sodium chloride 1,000 mL (11/28/22 2042)     PRN Meds: morphine, 2 mg, Q6H PRN  polyvinyl alcohol, 1 drop, Q2H PRN  promethazine, 6.25 mg, Q6H PRN  bisacodyl, 10 mg, Daily PRN  heparin flush, 100 Units, PRN  albuterol, 2.5 mg, Q6H PRN  oxyCODONE-acetaminophen, 1 tablet, Q6H PRN  sodium chloride flush, 10 mL, PRN  sodium chloride, 500 mL, PRN  ondansetron, 4 mg, Q8H PRN   Or  ondansetron, 4 mg, Q6H PRN  nicotine polacrilex, 2 mg, Q1H PRN  acetaminophen, 650 mg, Q6H PRN   Or  acetaminophen, 650 mg, Q6H PRN  tiZANidine, 4 mg, Q8H PRN      LABS  CBC   Recent Labs     11/29/22  1015 11/30/22  0600 12/01/22  1038   WBC 3.0* 3.4* 3.8*   HGB 8.4* 8.6* 10.4*   HCT 28.0* 28.4* 33.3*   * 154 179        RENAL  Recent Labs     11/29/22  0640 12/01/22  1038    143   K 4.2 4.5    107   CO2 23 27   BUN 8 8   CREATININE 0.7 0.8       LFT'S  Recent Labs     12/01/22  1038   AST 16   ALT 12   BILITOT 0.3   ALKPHOS 132*     COAG  Recent Labs     12/01/22  1038   INR 0.89       Radiology this visit: Reviewed. XR KNEE LEFT (MIN 4 VIEWS)    Result Date: 11/26/2022  EXAMINATION: FOUR XRAY VIEWS OF THE LEFT KNEE 11/26/2022 10:33 am COMPARISON: None. HISTORY: ORDERING SYSTEM PROVIDED HISTORY: Fall, left knee pain TECHNOLOGIST PROVIDED HISTORY: Reason for exam:->Fall, left knee pain Reason for Exam: Fall, left knee pain Additional signs and symptoms: Fall, left knee pain Relevant Medical/Surgical History: Fall, left knee pain FINDINGS: Small suprapatellar bursal effusion. No radiographic evidence of acute fracture or dislocation. Multi compartment moderate degenerative arthritic change noted worst in the medial tibiofemoral joint space. No bone destruction, malignant-appearing periosteal reaction or other acute soft tissue abnormality. Small suprapatellar bursal effusion, multi compartment moderate degenerative arthritic change worst in the medial tibiofemoral joint space. No other acute radiographic abnormality left knee. CT ABDOMEN PELVIS W IV CONTRAST Additional Contrast? None    Result Date: 11/28/2022  EXAMINATION: CT OF THE ABDOMEN AND PELVIS WITH CONTRAST 11/26/2022 11:32 am TECHNIQUE: CT of the abdomen and pelvis was performed with the administration of intravenous contrast. Multiplanar reformatted images are provided for review. Automated exposure control, iterative reconstruction, and/or weight based adjustment of the mA/kV was utilized to reduce the radiation dose to as low as reasonably achievable. COMPARISON: CT 09/19/2022 HISTORY: ORDERING SYSTEM PROVIDED HISTORY: Upper abdominal pain TECHNOLOGIST PROVIDED HISTORY: Reason for exam:->Upper abdominal pain Additional Contrast?->None Decision Support Exception - unselect if not a suspected or confirmed emergency medical condition->Emergency Medical Condition (MA) Reason for Exam: Upper abdominal pain FINDINGS: Lower Chest: Visualized lung bases demonstrate no acute abnormality. Organs:  The liver, spleen, pancreas, adrenal glands, and kidneys demonstrate no acute abnormality. Prior cholecystectomy. Again seen is pneumobilia. Similar mild prominence of the common bile duct. Nonobstructing small intrarenal calculi. GI/Bowel: No evidence to suggest acute appendicitis. 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 Again seen is mild distention of the gastric pouch with ingested contents. Short-segment mildly dilated loop of small bowel is seen in the left mid abdomen. Mild to moderate stool burden is seen in the colon. Pelvis: The urinary bladder is unremarkable. Peritoneum/Retroperitoneum: No lymphadenopathy is seen. No intraperitoneal free air or significant free fluid. Bones/Soft Tissues: No acute bony abnormality is seen. Short segment mildly dilated loop of small bowel in the left mid abdomen. This is more prominent than the prior CT. This could represent an ileus or low-grade obstruction. 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 Again seen is mild distension of the gastric pouch with ingested contents. Prior cholecystectomy. Again seen is pneumobilia and mild prominence of the common bile duct. US ABDOMEN LIMITED Specify organ? LIVER, GALLBLADDER, PANCREAS    Result Date: 11/30/2022  EXAMINATION: RIGHT UPPER QUADRANT ULTRASOUND 11/30/2022 8:19 am COMPARISON: CT abdomen and pelvis 1126 22 HISTORY: ORDERING SYSTEM PROVIDED HISTORY: Epigastric and ruq pain, mild distension of common bile duct seen on CT scan TECHNOLOGIST PROVIDED HISTORY: Reason for exam:->Epigastric and ruq pain, mild distension of common bile duct seen on CT scan Specify organ?->LIVER Specify organ?->GALLBLADDER Specify organ?->PANCREAS FINDINGS: LIVER:  The liver demonstrates normal echogenicity without evidence of intrahepatic biliary ductal dilatation. BILIARY SYSTEM:  Status post cholecystectomy. Common bile duct is within normal limits measuring 0.8 cm.  RIGHT KIDNEY: The right kidney is grossly unremarkable without evidence of hydronephrosis. The right kidney measures 11 x 5.1 x 5 cm. PANCREAS:  Visualized portions of the pancreas are unremarkable. OTHER: No evidence of right upper quadrant ascites. Mildly dilated common bile duct measuring up to 0.8 cm likely secondary to cholecystectomy changes. US EXTREMITY LEFT NON VASC LIMITED    Result Date: 11/26/2022  EXAMINATION: NONVASCULAR ULTRASOUND OF THE LEFT EXTREMITY; VENOUS ULTRASOUND OF THE LEFT EXTREMITY 11/26/2022 10:45 am COMPARISON: 11/02/2022 09/19/2022 10/11/2022 HISTORY: ORDERING SYSTEM PROVIDED HISTORY: knee pain TECHNOLOGIST PROVIDED HISTORY: Reason for exam:->knee pain FINDINGS: Focused ultrasound demonstrates a complex collection in the suprapatellar region, measuring 9.4 x 5.8 x 8.5 cm. This has been described on prior studies. .  It appears increased in size compared to 09/19, but decreased in size in its greatest dimension compared to 10/11 Visualized veins are patent and free of thrombus. No DVT identified. Persistent complex collection in the suprapatellar region. This may relate to prior surgery or trauma. Correlate for signs of infection or bursitis. No DVT noted     VL DUP LOWER EXTREMITY VENOUS LEFT    Result Date: 11/26/2022  EXAMINATION: NONVASCULAR ULTRASOUND OF THE LEFT EXTREMITY; VENOUS ULTRASOUND OF THE LEFT EXTREMITY 11/26/2022 10:45 am COMPARISON: 11/02/2022 09/19/2022 10/11/2022 HISTORY: ORDERING SYSTEM PROVIDED HISTORY: knee pain TECHNOLOGIST PROVIDED HISTORY: Reason for exam:->knee pain FINDINGS: Focused ultrasound demonstrates a complex collection in the suprapatellar region, measuring 9.4 x 5.8 x 8.5 cm. This has been described on prior studies. .  It appears increased in size compared to 09/19, but decreased in size in its greatest dimension compared to 10/11 Visualized veins are patent and free of thrombus. No DVT identified. Persistent complex collection in the suprapatellar region. This may relate to prior surgery or trauma. Correlate for signs of infection or bursitis. No DVT noted     VL LOWER EXTREMITY VENOUS LEFT    Result Date: 11/2/2022  EXAMINATION: DUPLEX VENOUS ULTRASOUND OF THE LEFT LOWER EXTREMITY 11/2/2022 4:07 pm TECHNIQUE: Duplex ultrasound using B-mode/gray scaled imaging and Doppler spectral analysis and color flow was obtained of the deep venous structures of the left extremity. COMPARISON: October 11, 2022 HISTORY: ORDERING SYSTEM PROVIDED HISTORY: Pain and swelling of left lower leg TECHNOLOGIST PROVIDED HISTORY: Reason for exam:->Possible DVT FINDINGS: The visualized veins of the left lower extremity are patent and free of echogenic thrombus. The veins demonstrate good compressibility with normal color flow study and spectral analysis. Evidence of knee effusion. No evidence of DVT in the left lower extremity.          Electronically signed by MICHAEL Dutta CNP on 12/1/2022 at 12:25 PM

## 2022-12-01 NOTE — PROGRESS NOTES
Infectious Disease Progress Note  2022   Patient Name: Zack Broussard : 1970     Assessment  Left knee hemarthrosis  Feel better  Continues to have left knee swelling  Hx of positive rheumatoid factor  Left knee aspirate cx done on 2022 shows ngtd  Bicytopenia  Anemia and leukopenia   Low iron  Morbid obesity  Comorbid conditions:      Plan  Therapeutic: does not require abx  Diagnostic:  F/u: KOBY, citrullinated peptide, RF  Other: Hemonc evaluation is ongoing. Reason for visit: F/u Left knee hemarthrosis? History:? Interval history noted  Denies n/v/d/f or untoward effects of antimicrobials  Left knee swelling persists    Physical Exam:  Vital Signs: BP (!) 147/82   Pulse 57   Temp 98.5 °F (36.9 °C) (Oral)   Resp 16   Ht 5' 4\" (1.626 m)   Wt 240 lb 15.4 oz (109.3 kg)   SpO2 98%   BMI 41.36 kg/m²     Gen: alert and oriented X3, no distress  Skin: no stigmata of endocarditis; livedo reticularis of both upper extremities  Wounds: C/D/I  HEMT: AT/NC Oropharynx pink, moist, and without lesions or exudates;   Eyes: PERRLA, EOMI, conjunctiva pink, sclera anicteric. Neck: Supple. Trachea midline. No LAD. Chest: no distress and CTA. Good air movement. Heart: RRR and no MRG. Abd: soft, non-distended, no tenderness, no hepatomegaly. Normoactive bowel sounds. Ext: no clubbing, cyanosis, or edema  Catheter Site: without erythema or tenderness  LDA: Left anterior chest Mediport  Neuro: Mental status intact. CN 2-12 intact and no focal sensory or motor deficits     Radiologic / Imaging / TESTING  No results found. Labs:    No results found for this or any previous visit (from the past 24 hour(s)).     CULTURE results: Invalid input(s): BLOOD CULTURE,  URINE CULTURE, SURGICAL CULTURE    Diagnosis:  Patient Active Problem List   Diagnosis    Fever    Fibromyalgia    Lupus (systemic lupus erythematosus) (HCC)    Chronic pancreatitis ([facility] [address]    HTN (hypertension)    Frequent UTI Gastroesophageal reflux disease without esophagitis    Chronic depression    Fatty liver disease, nonalcoholic    Arthritis    Bilateral low back pain with left-sided sciatica    Hiatal hernia    Chronic pain syndrome    Drug-seeking/Aberrant behavior    Receiving intravenous antibiotic treatment as outpatient    Lymph node disorder    Morbid obesity with BMI of 40.0-44.9, adult ([facility])    Iron deficiency anemia    History of Jet-en-Y gastric bypass    Anxiety    RUQ pain    Discoloration of skin of flank resembling ecchymosis    Chest pain of uncertain etiology    Cellulitis of left thigh    Malabsorption    COPD (chronic obstructive pulmonary disease) ([facility])    Nausea    Hypothyroidism due to acquired atrophy of thyroid    Vitamin D deficiency    Irritable bowel syndrome    Malabsorption due to intolerance, not elsewhere classified    Port-A-Cath in place    Pseudoseizures [facility])    Diarrhea    Myalgia    Acute bilateral deep vein thrombosis (DVT) of femoral veins ([facility])    Acute deep vein thrombosis (DVT) of popliteal vein of left lower extremity ([facility])    Swelling of joint of left knee    Septic arthritis of knee, left ([facility])    Hemarthrosis of left knee    Ileus ([facility])    Acute pain of left knee    Hemarthrosis involving knee joint, left    Epigastric pain       Active Problems  Principal Problem:    Ileus ([facility])  Active Problems:    Acute pain of left knee    Hemarthrosis involving knee joint, left    Epigastric pain  Resolved Problems:    * No resolved hospital problems.  *              Electronically signed by: Electronically signed by Jakob Martin MD on 12/1/2022 at 9:46 AM

## 2022-12-02 VITALS
WEIGHT: 240.96 LBS | OXYGEN SATURATION: 93 % | SYSTOLIC BLOOD PRESSURE: 110 MMHG | RESPIRATION RATE: 18 BRPM | HEART RATE: 74 BPM | BODY MASS INDEX: 41.14 KG/M2 | HEIGHT: 64 IN | DIASTOLIC BLOOD PRESSURE: 60 MMHG | TEMPERATURE: 97.9 F

## 2022-12-02 LAB
ANTI-NUCLEAR ANTIBODY (ANA): NORMAL
CYCLIC CITRULLINATED PEPTIDE ANTIBODY IGG: 185 UNITS (ref 0–19)

## 2022-12-02 PROCEDURE — 99232 SBSQ HOSP IP/OBS MODERATE 35: CPT | Performed by: SURGERY

## 2022-12-02 PROCEDURE — 99231 SBSQ HOSP IP/OBS SF/LOW 25: CPT | Performed by: INTERNAL MEDICINE

## 2022-12-02 PROCEDURE — 6370000000 HC RX 637 (ALT 250 FOR IP): Performed by: NURSE PRACTITIONER

## 2022-12-02 PROCEDURE — 2580000003 HC RX 258: Performed by: NURSE PRACTITIONER

## 2022-12-02 PROCEDURE — 99232 SBSQ HOSP IP/OBS MODERATE 35: CPT | Performed by: INTERNAL MEDICINE

## 2022-12-02 PROCEDURE — 94761 N-INVAS EAR/PLS OXIMETRY MLT: CPT

## 2022-12-02 PROCEDURE — 6360000002 HC RX W HCPCS: Performed by: EMERGENCY MEDICINE

## 2022-12-02 PROCEDURE — 6370000000 HC RX 637 (ALT 250 FOR IP): Performed by: PHYSICIAN ASSISTANT

## 2022-12-02 PROCEDURE — 6370000000 HC RX 637 (ALT 250 FOR IP): Performed by: FAMILY MEDICINE

## 2022-12-02 PROCEDURE — 6360000002 HC RX W HCPCS: Performed by: NURSE PRACTITIONER

## 2022-12-02 RX ORDER — PREDNISONE 20 MG/1
40 TABLET ORAL DAILY
Status: DISCONTINUED | OUTPATIENT
Start: 2022-12-02 | End: 2022-12-02 | Stop reason: HOSPADM

## 2022-12-02 RX ORDER — GABAPENTIN 800 MG/1
800 TABLET ORAL 3 TIMES DAILY
Qty: 12 TABLET | Refills: 0 | Status: SHIPPED | OUTPATIENT
Start: 2022-12-02 | End: 2022-12-08

## 2022-12-02 RX ORDER — OXYCODONE HYDROCHLORIDE AND ACETAMINOPHEN 5; 325 MG/1; MG/1
1 TABLET ORAL EVERY 4 HOURS PRN
Qty: 12 TABLET | Refills: 0 | Status: SHIPPED | OUTPATIENT
Start: 2022-12-02 | End: 2022-12-05

## 2022-12-02 RX ORDER — PREDNISONE 20 MG/1
40 TABLET ORAL DAILY
Qty: 20 TABLET | Refills: 0 | Status: SHIPPED | OUTPATIENT
Start: 2022-12-03 | End: 2022-12-13

## 2022-12-02 RX ORDER — PROMETHAZINE HYDROCHLORIDE 25 MG/1
25 TABLET ORAL 4 TIMES DAILY PRN
Qty: 20 TABLET | Refills: 0 | Status: SHIPPED | OUTPATIENT
Start: 2022-12-02 | End: 2022-12-09

## 2022-12-02 RX ADMIN — PANTOPRAZOLE SODIUM 40 MG: 40 TABLET, DELAYED RELEASE ORAL at 06:51

## 2022-12-02 RX ADMIN — ONDANSETRON 4 MG: 2 INJECTION INTRAMUSCULAR; INTRAVENOUS at 07:45

## 2022-12-02 RX ADMIN — HYDROXYZINE HYDROCHLORIDE 50 MG: 50 TABLET, FILM COATED ORAL at 10:21

## 2022-12-02 RX ADMIN — LEVOTHYROXINE SODIUM 125 MCG: 0.12 TABLET ORAL at 06:51

## 2022-12-02 RX ADMIN — PREDNISONE 40 MG: 20 TABLET ORAL at 10:22

## 2022-12-02 RX ADMIN — ESTRADIOL 0.5 MG: 1 TABLET ORAL at 10:21

## 2022-12-02 RX ADMIN — PAROXETINE HYDROCHLORIDE 40 MG: 20 TABLET, FILM COATED ORAL at 10:21

## 2022-12-02 RX ADMIN — GABAPENTIN 800 MG: 400 CAPSULE ORAL at 10:22

## 2022-12-02 RX ADMIN — OXYCODONE AND ACETAMINOPHEN 2 TABLET: 5; 325 TABLET ORAL at 14:16

## 2022-12-02 RX ADMIN — OXYCODONE AND ACETAMINOPHEN 2 TABLET: 5; 325 TABLET ORAL at 07:46

## 2022-12-02 RX ADMIN — POLYETHYLENE GLYCOL (3350) 17 G: 17 POWDER, FOR SOLUTION ORAL at 10:20

## 2022-12-02 RX ADMIN — MORPHINE SULFATE 15 MG: 15 TABLET, FILM COATED, EXTENDED RELEASE ORAL at 10:22

## 2022-12-02 RX ADMIN — ENOXAPARIN SODIUM 30 MG: 100 INJECTION SUBCUTANEOUS at 10:21

## 2022-12-02 RX ADMIN — GABAPENTIN 800 MG: 400 CAPSULE ORAL at 14:16

## 2022-12-02 RX ADMIN — SODIUM CHLORIDE, PRESERVATIVE FREE 10 ML: 5 INJECTION INTRAVENOUS at 10:20

## 2022-12-02 RX ADMIN — LEVETIRACETAM 1000 MG: 500 TABLET, FILM COATED ORAL at 10:21

## 2022-12-02 RX ADMIN — HEPARIN SODIUM (PORCINE) LOCK FLUSH IV SOLN 100 UNIT/ML 100 UNITS: 100 SOLUTION at 13:18

## 2022-12-02 ASSESSMENT — PAIN DESCRIPTION - DESCRIPTORS
DESCRIPTORS: ACHING
DESCRIPTORS: ACHING;SHARP;NAGGING

## 2022-12-02 ASSESSMENT — ENCOUNTER SYMPTOMS
NAUSEA: 0
RESPIRATORY NEGATIVE: 1
ABDOMINAL PAIN: 0
BACK PAIN: 1
ALLERGIC/IMMUNOLOGIC NEGATIVE: 1
COLOR CHANGE: 0
EYES NEGATIVE: 1

## 2022-12-02 ASSESSMENT — PAIN DESCRIPTION - LOCATION
LOCATION: GENERALIZED
LOCATION: ABDOMEN

## 2022-12-02 ASSESSMENT — PAIN SCALES - GENERAL
PAINLEVEL_OUTOF10: 5
PAINLEVEL_OUTOF10: 8
PAINLEVEL_OUTOF10: 3
PAINLEVEL_OUTOF10: 7

## 2022-12-02 ASSESSMENT — PAIN SCALES - WONG BAKER: WONGBAKER_NUMERICALRESPONSE: 6

## 2022-12-02 NOTE — SIGNIFICANT EVENT
Rapid response note    Was called after patient noted to be not responsive to the nursing staff, and was breathing heavily after being told that she is now on p.o. pain medications only and that she will be discharged in the morning. Upon my presentation while the nursing staff was giving history, patient opened her eyes and screaming that the nursing staff are lying about her symptoms and about asking for IV pain medications and that she just just wants help breathing. Patient is telling me that she is nauseous and if if it does not get controlled then she will have a seizure. Patient insisting she might be having a seizure on my exam.    On my exam, patient noted to be hyperventilating but then slowed down after a few minutes. She was saturating 100% with supplemental oxygen that was put on. When she took her mask off herself she was still saturating at present. Lungs clear to auscultation bilaterally without any wheezing or rales. When I tried to do a neuro exam patient clutched her eyes checked and did not allow me to examine. However moving all her extremities spontaneously    Assessment and plan reveals that patient was visibly upset after being told she is can be discharged in the morning. No signs of respite distress. A routine set of labs were drawn and did not reveal any acute abnormalities. Follow-up KUB negative for obstruction. Chest x-ray negative for infiltrates. No further work-up. Discussed with nursing staff.

## 2022-12-02 NOTE — PROGRESS NOTES
Outpatient Pharmacy Progress Note for Meds-to-Beds    Total number of Prescriptions Filled: 4  The following medications were dispensed to the patient during the discharge process:  Gabapentin  Oxycodone-APAP  Prednisone  Promethazine     Additional Documentation:  Medications given to nurse Michael  to provide to patient due to contact precautions       Thank you for letting us serve your patients.   [address] [facility]\""    [address], [address] [facility]    Phone: [phone]    Fax: [phone]

## 2022-12-02 NOTE — PROGRESS NOTES
Call from nurse that patient started having episodes of vomiting after she was told that her IV narcotics were discontinued. Concerns for drug-seeking behavior as mentioned previously. We will check a KUB as patient came in with an ileus and want to ensure no obstruction. No indication for IV narcotics and plan to de-escalate oral regimen further in a.m.

## 2022-12-02 NOTE — PROGRESS NOTES
HEMATOLOGY ONCOLOGY  Progress Note    HISTORY OF PRESENT ILLNESS   Andre Wong is a 45 y.o. female past medical history significant for hypothyroidism, anxiety and depression, chronic pancreatitis, anemia, fibromyalgiagastric bypass, lupus, septic arthritis, gastric bypass who presents with abdominal discomfort and left knee pain. 11/26/2022 CT abdomen pelvis with ileus versus low-grade obstruction. Abdominal ultrasound with mildly dilated common bile duct measuring up to 0.8 cm likely secondary to cholecystectomy changes. Of note she was diagnosed with nausea vomiting abdominal discomfort and knee pain September 27, 2022. Completed antibiotics 1 week ago. Recent Labs     12/01/22  2041 12/01/22  1038 11/30/22  0600   WBC 4.4 3.8* 3.4*   HGB 10.2* 10.4* 8.6*   HCT 32.8* 33.3* 28.4*   MCV 81.0 81.6 83.0    179 154      Lab Results   Component Value Date    IRON 25 (L) 11/29/2022    TIBC 267 11/29/2022    FERRITIN 32 11/29/2022     Recently seen for hemarthrosis, she denies previous history of bleeding complications with surgery, no easy bleeding from gums, no melena, hematochezia, no rash. On exam today she reports ongoing discomfort to left knee, generalized body discomfort, abdominal discomfort with eating \"too much\". No chest pain, shortness of breath. 12/2/22 seen and examined. Rapid response noted. Patient reports she is ready for DC. Hgb improved to 10.2. Had some nausea without emesis. Nutritional labs pending. No acute distress is noted today.       REVIEW OF SYSTEMS    Constitutional:  Denies fever, chills, loss of appetite, weight loss, tiredness, fatigue or weakness   HEENT:  Denies swelling of neck glands  Respiratory:  Denies cough, shortness of breath or hemoptysis  Cardiovascular:  Denies chest pain, palpitations or swelling   GI:  +abdominal pain, nausea, vomiting, constipation or diarrhea   Musculoskeletal:  Denies back pain +knee pain  Skin:  Denies rash   Neurologic: Denies headache, focal weakness or sensory changes   All systems negative except as marked. PHYSICAL EXAM    Vitals: /60   Pulse 74   Temp 97.9 °F (36.6 °C) (Oral)   Resp 16   Ht 5' 4\" (1.626 m)   Wt 240 lb 15.4 oz (109.3 kg)   SpO2 93%   BMI 41.36 kg/m²   CONSTITUTIONAL: awake, alert, cooperative, no apparent distress   EYES:  sclera clear and conjunctiva normal  ENT: Normocephalic, without obvious abnormality, atraumatic  NECK: supple, symmetrical  HEMATOLOGIC/LYMPHATIC: no cervical, supraclavicular or axillary lymphadenopathy   LUNGS: no increased work of breathing and clear to auscultation   CARDIOVASCULAR: regular rate and rhythm, normal S1 and S2, no murmur noted  ABDOMEN: normal bowel sounds x 4, soft, non-distended, ?TTP  MUSCULOSKELETAL: Left knee mild swelling  NEUROLOGIC: awake, alert, oriented to name, place and time. Motor skills grossly intact. SKIN: Normal skin color, texture, turgor and no jaundice. Skin appears intact   EXTREMITIES: no LE edema, no cyanosis, no leg swelling, no clubbing    LABORATORY RESULTS  CBC:   Recent Labs     11/30/22  0600 12/01/22  1038 12/01/22 2041   WBC 3.4* 3.8* 4.4   HGB 8.6* 10.4* 10.2*    179 203     BMP:    Recent Labs     12/01/22  1038 12/01/22 2041    140   K 4.5 4.0    105   CO2 27 26   BUN 8 10   CREATININE 0.8 0.7   GLUCOSE 88 109*     Hepatic:   Recent Labs     12/01/22  1038 12/01/22 2041   AST 16 15   ALT 12 12   BILITOT 0.3 0.1   ALKPHOS 132* 122     INR:   Recent Labs     12/01/22  1038   INR 0.89         ASSESSMENT/RECOMMENDATION    Bicytopenia-Iron studies noted. to have venofer 300 mg today, to dc home on slow FE. Trend CBC. Hgb improved. Recommend follow up in office for surveillance. Left knee swelling- hx of +RF, hx of hemarthrosis, we will obtain labs to r/o APS and vWD. She will need to follow up in office for results. ID following, no need for ABX. Refer to rheumatology as outpatient.      Hx of gastric bypass- general surgery to check nutritional labs today    We will follow the patient. Thank you for allowing me to participate in the care of this very pleasant patient. I have independently evaluated and examined this patient today. I have reviewed radiologic and biochemical tests on this patient. Management Plan is developed mutually with Beba Posada NP.  I have reviewed above note and agree with assessment and plan  EDIL

## 2022-12-02 NOTE — PROGRESS NOTES
Pt alert and responsive during evening report and called this nurse and said she was \"throwing up\". Later patient put call light on and STNA went to answer it and called this nurse to come in to room. Zofran 4mg iv given. Pt appeared to be having a pseudo seizure and spitting up. Called charge nurse, Cierra Mcintosh. Pt flailing across bed saying \"help me I can't breathe\". RR called. V/S taken WNL. NRB placed on patient with anoxygen sat of 100%. Labs drawn.

## 2022-12-02 NOTE — CARE COORDINATION
[facility] Liaison spoke with pt and she decided she did not wanted [facility] to be continued. Now [facility] notified via email.

## 2022-12-02 NOTE — PLAN OF CARE
Problem: ABCDS Injury Assessment  Goal: Absence of physical injury  Outcome: Adequate for Discharge     Problem: Discharge Planning  Goal: Discharge to home or other facility with appropriate resources  Outcome: Adequate for Discharge     Problem: Pain  Goal: Verbalizes/displays adequate comfort level or baseline comfort level  Outcome: Adequate for Discharge

## 2022-12-02 NOTE — PROGRESS NOTES
General Surgery- [facility] Day: 7    Chief Complaint on Admission: possible ileus      Subjective:     Events noted. Denies complaints this morning. Had nutritional labs ordered - will take a few days to result. Wants to go home. ROS:  Review of Systems   HENT: Negative. Eyes: Negative. Respiratory: Negative. Cardiovascular: Negative. Gastrointestinal:  Negative for abdominal pain and nausea. Endocrine: Negative. Genitourinary: Negative. Musculoskeletal:  Positive for arthralgias, back pain and joint swelling. Skin:  Negative for color change. Allergic/Immunologic: Negative. Hematological: Negative. All other systems reviewed and are negative. Allergies  Aspirin, Compazine [prochlorperazine], Shellfish-derived products, Toradol [ketorolac tromethamine], Haldol [haloperidol], Asa [aspirin], Compazine [prochlorperazine], Haldol [haloperidol], Reglan [metoclopramide], Reglan [metoclopramide], and Toradol [ketorolac tromethamine]          Diagnosis Date    Acid reflux     Anemia     Anxiety     Arthritis     Hands, Back And Ankles    Arthritis     Bleeding ulcer 2014    \"I Had Ulcers In My Stomach And Colon\"/ per pt on 8/12/2019\"they said recently having some blood in my stomach- in July ( 2019)could not find where coming from \"    Bronchitis Last Episode 2014    Chronic back pain     Chronic pain     [facility]    COPD (chronic obstructive pulmonary disease) ([facility]     Sees Dr. Navdeep Rosales    Depression     Disease of blood and blood forming organ     Fibromyalgia Dx 2013    GERD (gastroesophageal reflux disease)     H/O echocardiogram 08/11/2015    EF >55%. LA to be at the upper limit of normal in size. LV hypertrophy with normal LV systolic, but abnormal diastolic function. Normal valvular structures and function.      H/O echocardiogram 08/30/2018    EF 55-60%    Hiatal hernia     History of blood transfusion 09/2015 And 2018    No Reaction To Blood Transfusions Received    History of sleeve gastrectomy     [place] hearing[place]     Right Ear    Hx of cardiac catheterization 10/24/2010    Angiographically normal coronary arteries w/ normal LV function and wall motion. Hx of transesophageal echocardiography (PILO) for monitoring 2010    EF 55-60%. WNL. 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 name    Hypertension     Immune deficiency disorder ([address]     Kidney cysts     \"they found that I have a kidney cyst but not sure which side\" per pt on 7/15/2020    Kidney stone     Lupus ([address] Dx     \"Rheumatoid Lupus\"    MDRO (multiple drug resistant organisms) resistance     4 months ago chest from mediport    Morbid obesity ([address]     MRSA bacteremia     Nausea     \"Most Of The Time\"    Osteomyelitis of fifth toe of left foot (HCC)     Pain management     Sees Dr. Es Ureña, Once A Month    Pancreatitis chronic Dx     Pneumonia Dx 11-15    Seizures ([address]     Shortness of breath on exertion     Shortness of breath on exertion     Sleep apnea     Has CPAP\"no longer use the cpap since lost weight\"    Staph infection Dx 11-15    Left Foot    Staph infection Dx 11-15    \"Left Foot\"    Teeth missing     Upper And Lower    Thyroid disease     Wears glasses     To Read       Objective:     Vitals:    22 0901   BP: 110/60   Pulse: 74   Resp:    Temp: 97.9 °F (36.6 °C)   SpO2: 90%       TEMPERATURE:  Current -Temp: 97.9 °F (36.6 °C); Max - Temp  Av.2 °F (36.8 °C)  Min: 97.5 °F (36.4 °C)  Max: 98.8 °F (37.1 °C)    No intake/output data recorded. I/O last 3 completed shifts:   In: 10 [I.V.:10]  Out: 60 [Emesis/NG output:60]      Physical Exam:  Physical Exam  Vitals reviewed. Constitutional:       General: She is not in acute distress. Appearance: She is obese. Comments: Sitting up in bed, eating. HENT:      Head: Normocephalic and atraumatic. Right Ear: External ear normal.      Left Ear: External ear normal.   Eyes:      General:         Right eye: No discharge. Left eye: No discharge. Extraocular Movements: Extraocular movements intact. Cardiovascular:      Rate and Rhythm: Normal rate. Pulmonary:      Effort: No respiratory distress. Abdominal:      Palpations: Abdomen is soft. Tenderness: There is no abdominal tenderness. There is no guarding or rebound. Musculoskeletal:         General: No swelling. Skin:     General: Skin is warm. Neurological:      General: No focal deficit present. Mental Status: She is alert.    Psychiatric:         Mood and Affect: Mood normal.         Scheduled Meds:   predniSONE  40 mg Oral Daily    morphine  15 mg Oral 2 times per day    polyethylene glycol  17 g Oral Daily    hydrOXYzine HCl  50 mg Oral BID    levETIRAcetam  1,000 mg Oral BID    levothyroxine  125 mcg Oral QAM AC    pantoprazole  40 mg Oral QAM AC    PARoxetine  40 mg Oral Daily    sodium chloride flush  5-40 mL IntraVENous 2 times per day    enoxaparin  30 mg SubCUTAneous BID    gabapentin  800 mg Oral TID    estradiol  0.5 mg Oral Daily     ContinuousInfusions:   sodium chloride      sodium chloride 1,000 mL (11/28/22 2042)     PRN Meds:promethazine, oxyCODONE-acetaminophen, polyvinyl alcohol, bisacodyl, heparin flush, albuterol, sodium chloride flush, sodium chloride, ondansetron **OR** ondansetron, nicotine polacrilex, acetaminophen **OR** acetaminophen, tiZANidine      Labs/Imaging Results:   Lab Results   Component Value Date    WBC 4.4 12/01/2022    HGB 10.2 (L) 12/01/2022    HCT 32.8 (L) 12/01/2022    MCV 81.0 12/01/2022     12/01/2022     Lab Results   Component Value Date     12/01/2022    K 4.0 12/01/2022     12/01/2022    CO2 26 12/01/2022    BUN 10 12/01/2022    CREATININE 0.7 12/01/2022    GLUCOSE 109 (H) 12/01/2022    CALCIUM 10.1 12/01/2022    PROT 6.4 12/01/2022    LABALBU 4.1 12/01/2022    BILITOT 0.1 12/01/2022    ALKPHOS 122 12/01/2022    AST 15 12/01/2022    ALT 12 12/01/2022    LABGLOM >60 12/01/2022    GFRAA >60 10/11/2022     GI Panel - unremarkable. US:   Mildly dilated common bile duct measuring up to 0.8 cm likely secondary to   cholecystectomy changes. Assessment:       46 y/o F with L knee pain and concern for possible ileus, resolving    Plan:       -Ok for d/c from Gen Surg standpoint.     -Regular diet at d/c.     -Epigastric pain seems to be related to volume of food pt eats. D/w pt that since she previously had a gastric bypass with Dr. Honeycutt Cough that her stomach is much smaller than typical stomach and if she overeats she will have epigastric pain as a natural reaction. She should not overeat as it will stretch her pouch. Ideally 600-800 liam / d and 60 g protein / day. May benefit from outpatient f/u with RD to review diet.     -Check bariatric / nutritional labs. Should be on daily multivitamin + calcium + iron give past surgical history. These will be resulted by time pt follows up with me in office.         Electronically signed by Pauly Schofield II, MD on 12/2/2022 at 9:09 AM

## 2022-12-02 NOTE — PROGRESS NOTES
Infectious Disease Progress Note  2022   Patient Name: Parminder Sharp : 1970     Assessment  Left knee hemarthrosis  Feel better  Hx of positive rheumatoid factor. Negative KOBY. Positive citrullinated peptide  Left knee aspirate cx done on 2022 shows ngtd  This is noninfective. Concerned about rheumatoid arthritis. Follow-up with rheumatology. Bicytopenia  Anemia and leukopenia   Low iron  Hematology/oncology on consult  Morbid obesity  Comorbid conditions:      Plan  Therapeutic: does not require abx  Diagnostic:  F/u: KOBY, citrullinated peptide, RF  Other: Hemonc evaluation is ongoing. Will sign off and not follow the patient actively, please reconsult as needed. Reason for visit: F/u Left knee hemarthrosis? History:? Interval history noted  Denies n/v/d/f or untoward effects of antimicrobials  Left knee swelling persists    Physical Exam:  Vital Signs: /60   Pulse 74   Temp 97.9 °F (36.6 °C)   Resp 16   Ht 5' 4\" (1.626 m)   Wt 240 lb 15.4 oz (109.3 kg)   SpO2 90%   BMI 41.36 kg/m²     Gen: alert and oriented X3, no distress  Skin: no stigmata of endocarditis; livedo reticularis of both upper extremities  Wounds: C/D/I  HEMT: AT/NC Oropharynx pink, moist, and without lesions or exudates;   Eyes: PERRLA, EOMI, conjunctiva pink, sclera anicteric. Neck: Supple. Trachea midline. No LAD. Chest: no distress and CTA. Good air movement. Heart: RRR and no MRG. Abd: soft, non-distended, no tenderness, no hepatomegaly. Normoactive bowel sounds. Ext: no clubbing, cyanosis, or edema  Catheter Site: without erythema or tenderness  LDA: Left anterior chest Mediport  Neuro: Mental status intact. CN 2-12 intact and no focal sensory or motor deficits     Radiologic / Imaging / TESTING  No results found.      Labs:    Recent Results (from the past 24 hour(s))   POCT Glucose    Collection Time: 22  7:36 PM   Result Value Ref Range    POC Glucose 97 70 - 99 MG/DL   Lactic Acid    Collection Time: 12/01/22  8:05 PM   Result Value Ref Range    Lactate 1.6 0.4 - 2.0 mMOL/L   CBC with Auto Differential    Collection Time: 12/01/22  8:41 PM   Result Value Ref Range    WBC 4.4 4.0 - 10.5 K/CU MM    RBC 4.05 (L) 4.2 - 5.4 M/CU MM    Hemoglobin 10.2 (L) 12.5 - 16.0 GM/DL    Hematocrit 32.8 (L) 37 - 47 %    MCV 81.0 78 - 100 FL    MCH 25.2 (L) 27 - 31 PG    MCHC 31.1 (L) 32.0 - 36.0 %    RDW 13.3 11.7 - 14.9 %    Platelets 891 397 - 448 K/CU MM    MPV 10.5 7.5 - 11.1 FL    Differential Type AUTOMATED DIFFERENTIAL     Segs Relative 59.8 36 - 66 %    Lymphocytes % 26.3 24 - 44 %    Monocytes % 7.3 (H) 0 - 4 %    Eosinophils % 5.7 (H) 0 - 3 %    Basophils % 0.9 0 - 1 %    Segs Absolute 2.6 K/CU MM    Lymphocytes Absolute 1.2 K/CU MM    Monocytes Absolute 0.3 K/CU MM    Eosinophils Absolute 0.3 K/CU MM    Basophils Absolute 0.0 K/CU MM    Nucleated RBC % 0.0 %    Total Nucleated RBC 0.0 K/CU MM    Total Immature Neutrophil 0.00 K/CU MM    Immature Neutrophil % 0.0 0 - 0.43 %   Comprehensive Metabolic Panel    Collection Time: 12/01/22  8:41 PM   Result Value Ref Range    Sodium 140 135 - 145 MMOL/L    Potassium 4.0 3.5 - 5.1 MMOL/L    Chloride 105 99 - 110 mMol/L    CO2 26 21 - 32 MMOL/L    BUN 10 6 - 23 MG/DL    Creatinine 0.7 0.6 - 1.1 MG/DL    Est, Glom Filt Rate >60 >60 mL/min/1.73m2    Glucose 109 (H) 70 - 99 MG/DL    Calcium 10.1 8.3 - 10.6 MG/DL    Albumin 4.1 3.4 - 5.0 GM/DL    Total Protein 6.4 6.4 - 8.2 GM/DL    Total Bilirubin 0.1 0.0 - 1.0 MG/DL    ALT 12 10 - 40 U/L    AST 15 15 - 37 IU/L    Alkaline Phosphatase 122 40 - 129 IU/L    Anion Gap 9 4 - 16   CK    Collection Time: 12/01/22  8:41 PM   Result Value Ref Range    Total CK 46 26 - 140 IU/L       CULTURE results: Invalid input(s): BLOOD CULTURE,  URINE CULTURE, SURGICAL CULTURE    Diagnosis:  Patient Active Problem List   Diagnosis    Fever    Fibromyalgia    Lupus (systemic lupus erythematosus) (HCC)    Chronic pancreatitis ([address]    HTN (hypertension)    Frequent UTI    Gastroesophageal reflux disease without esophagitis    Chronic depression    Fatty liver disease, nonalcoholic    Arthritis    Bilateral low back pain with left-sided sciatica    Hiatal hernia    Chronic pain syndrome    Drug-seeking/Aberrant behavior    Receiving intravenous antibiotic treatment as outpatient    Lymph node disorder    Morbid obesity with BMI of 40.0-44.9, adult ([facility])    Iron deficiency anemia    History of Jet-en-Y gastric bypass    Anxiety    RUQ pain    Discoloration of skin of flank resembling ecchymosis    Chest pain of uncertain etiology    Cellulitis of left thigh    Malabsorption    COPD (chronic obstructive pulmonary disease) ([facility])    Nausea    Hypothyroidism due to acquired atrophy of thyroid    Vitamin D deficiency    Irritable bowel syndrome    Malabsorption due to intolerance, not elsewhere classified    Port-A-Cath in place    Pseudoseizures [facility])    Diarrhea    Myalgia    Acute bilateral deep vein thrombosis (DVT) of femoral veins ([facility])    Acute deep vein thrombosis (DVT) of popliteal vein of left lower extremity ([facility])    Swelling of joint of left knee    Septic arthritis of knee, left ([facility])    Hemarthrosis of left knee    Ileus ([facility])    Acute pain of left knee    Hemarthrosis involving knee joint, left    Epigastric pain       Active Problems  Principal Problem:    Ileus (HCC)  Active Problems:    Acute pain of left knee    Hemarthrosis involving knee joint, left    Epigastric pain  Resolved Problems:    * No resolved hospital problems.  *              Electronically signed by: Electronically signed by Vaibhav Price MD on 12/2/2022 at 11:14 AM

## 2022-12-02 NOTE — PLAN OF CARE
Problem: ABCDS Injury Assessment  Goal: Absence of physical injury  12/2/2022 1331 by Maureen Salcedo RN  Outcome: Adequate for Discharge  12/2/2022 1330 by Maureen Salcedo RN  Outcome: Adequate for Discharge     Problem: Discharge Planning  Goal: Discharge to home or other facility with appropriate resources  12/2/2022 1331 by Maureen Salcedo RN  Outcome: Adequate for Discharge  12/2/2022 1330 by Maureen Salcedo RN  Outcome: Adequate for Discharge     Problem: Pain  Goal: Verbalizes/displays adequate comfort level or baseline comfort level  12/2/2022 1331 by Maureen Salcedo RN  Outcome: Adequate for Discharge  12/2/2022 1330 by Maureen Salcedo RN  Outcome: Adequate for Discharge   Pt. Was able to schedule appointment with pain clinic on Monday. Pharmacy delivered pain medication to patient at bedside as ordered by physician to ensure patient was covered over the weekend until appointment on Monday.

## 2022-12-02 NOTE — DISCHARGE SUMMARY
Discharge Summary    Name:  Elizabeth Zavala /Age/Sex: 1970  (45 y.o. female)   MRN & CSN:  [id number] & [id number] Admission Date/Time: 2022  9:33 AM   Attending:  Ramos Lopez MD Discharging Provider MICHAEL [facility] Course:   Elizabeth Zavala is a 45 y.o.  female  who presents with Ileus [facility]    Hospital Course: The patient was initially admitted on 2022 for intractable abdominal pain  andconcern for SBO/ileus. She has a recent admission with extended antibiotics for left knee septic arthritis. Over the course of admission she had multiple consultants evaluate. General surgery following r/t ileus which resolved through admission course and cleared for discharge. She reported significant left knee pain and swelling to which orthopedic and infectious disease were asked to reevaluate for concern of returning septic arthritis. There was low suspicion for infectious etiology but recommended rheumatological/hematological work-up given history of lupus/RA. Heme-onc was consulted and recommended additional laboratory work-up which are pending at discharge. She will be discharged in stable condition with 3-day course of pain control so that she can follow-up with primary pain management provider. She has multiple recommended outpatient follow-ups for continued work-up and treatment. Her remaining clinical course is outlined below. She did decline home health care as noted     Abdominal pain   Illeus- resolved              US () Mildly dilated common bile duct measuring up to 0.8 cm likely secondary to cholecystectomy changes.     KUB () nonacute              Pain reported as slightly improved and now intermittent              Patient having bowel movements/flatus              Able to tolerate oral intake- continued intermittent nausea               PRN pain control-we will need to call pain management provider for follow-up and continue medications. 3-day Percocet prescription provided upon discharge              GI following - No plan EGD/colonoscopy at this time               Recommend outpatient follow-up with bariatric team    General surgery following - cleared for discharge recommended smaller portion sizes outpatient evaluation by dietitian      Left knee pain   Hemarthrosis                Ortho following with no intervention [id number] outpatient              Recent admission 9//5 for left knee septic arthritis completed extended course of IV Rocephin              ID following-low suspicion for infectious process but likely autoimmune etiology              Will need to follow-up with rheumatology outpatient      Anemia acute on chronic              Recent transfusion during previous admission              H/H 10.2/32. 8-stable discharge              Venofer x 3 doses              Consulted hematology/Oncology for evaluation of von Willebrand's/antiphospholipid. Schedule appointment outpatient with  to review pending labs/cytology      Pseudoseizure-continue Keppra              States the seizures episode occurred with uncontrolled pain      Lupus   Rheumatoid arthritis              Recent concern for flare during previous admission and discharged on prednisone taper              Follows with rheumatology outpatient-wanting to establish with new physician      MDD continue Paxil      Obesity- Body mass index is 41.36 kg/m². Lifestyle modifications needed         The patient expressed appropriate understanding of and agreement with the discharge recommendations, medications, and plan.      Consults this admission:  [address] TO GENERAL SURGERY  IP CONSULT TO ORTHOPEDIC SURGERY  IP CONSULT TO INFECTIOUS DISEASES  IP CONSULT TO GI  IP CONSULT TO ONCOLOGY  IP CONSULT TO IV TEAM    Discharge Instruction:   Follow up appointments:  [address] SURGERY- Dr Keshav Montiel- Dr Esperanza Cantrell- Dr Maury Maza- Dr Mukesh Thompson - Need to establish   Primary care physician:  within 2 weeks    Diet:  low fat, low cholesterol diet   Activity: activity as tolerated  Disposition: Discharged to:   [x]Home, []C, []SNF, []Acute Rehab, []Hospice   Condition on discharge: Stable    Discharge Medications:        Medication List        START taking these medications      ferrous sulfate 325 (65 Fe) MG tablet  Commonly known as: IRON 325  Take 1 tablet by mouth 2 times daily     promethazine 25 MG tablet  Commonly known as: PHENERGAN  Take 1 tablet by mouth 4 times daily as needed for Nausea     Vitamin D3 125 MCG (5000 UT) Tabs            CHANGE how you take these medications      oxyCODONE-acetaminophen 5-325 MG per tablet  Commonly known as: PERCOCET  Take 1 tablet by mouth every 4 hours as needed for Pain for up to 12 doses. What changed: See the new instructions.      predniSONE 20 MG tablet  Commonly known as: DELTASONE  Take 2 tablets by mouth daily for 10 days  Start taking on: December 3, 2022  What changed:   medication strength  how much to take  how to take this  when to take this  additional instructions            CONTINUE taking these medications      * albuterol (2.5 MG/3ML) 0.083% nebulizer solution  Commonly known as: PROVENTIL  Take 3 mLs by nebulization every 6 hours as needed for Wheezing     * albuterol sulfate  (90 Base) MCG/ACT inhaler  Commonly known as: PROVENTIL;VENTOLIN;PROAIR  Inhale 2 puffs into the lungs 4 times daily     betamethasone valerate 0.1 % ointment  Commonly known as: VALISONE     estradiol 0.5 MG tablet  Commonly known as: ESTRACE  Take 1 tablet by mouth daily     gabapentin 800 MG tablet  Commonly known as: NEURONTIN     hydrOXYzine HCl 50 MG tablet  Commonly known as: ATARAX     levETIRAcetam 1000 MG tablet  Commonly known as: KEPPRA  Take 1 tablet by mouth twice daily     levothyroxine 125 MCG tablet  Commonly known as: SYNTHROID  Take 1 tablet by mouth Daily     Melatonin 10 MG Tabs     Narcan 4 MG/0.1ML Liqd nasal spray  Generic drug: naloxone     ondansetron 4 MG disintegrating tablet  Commonly known as: ZOFRAN-ODT  Take 1 tablet by mouth 3 times daily as needed for Nausea or Vomiting     pantoprazole 40 MG tablet  Commonly known as: PROTONIX  Take 1 tablet by mouth twice daily     PARoxetine 30 MG tablet  Commonly known as: PAXIL  TAKE 1 & 1/2 (ONE & ONE-HALF) TABLETS BY MOUTH NIGHTLY     tiZANidine 4 MG tablet  Commonly known as: ZANAFLEX     VITAMIN B 12 PO           * This list has 2 medication(s) that are the same as other medications prescribed for you. Read the directions carefully, and ask your doctor or other care provider to review them with you.                 STOP taking these medications      CALCIUM + VIT D (BARIATRIC ADVANTAGE) CHEWABLE TABLET     meclizine 25 MG tablet  Commonly known as: ANTIVERT     MVI (BARIATRIC ADVANTAGE MULTI-FORMULA) CHEW TAB     potassium gluconate 550 mg tablet               Where to Get Your Medications        These medications were sent to [address] [place] [address] [address] [place] [address]      Phone: [phone]   oxyCODONE-acetaminophen 5-325 MG per tablet  predniSONE 20 MG tablet  promethazine 25 MG tablet         Objective Findings at Discharge:     Vitals:    12/01/22 2130 12/02/22 0221 12/02/22 0746 12/02/22 0901   BP: 124/86 103/65  110/60   Pulse: 74 65  74   Resp:   16    Temp: 98.2 °F (36.8 °C) 97.5 °F (36.4 °C)  97.9 °F (36.6 °C)   TempSrc: Oral      SpO2: 93% 96%  90%   Weight:       Height:                  Physical Exam: 12/02/22     Gen:  awake, alert, cooperative, no apparent distress obese body habitus  Head/Eyes:  Normocephalic atraumatic, EOMI   NECK:   symmetrical, trachea midline  LUNGS: Normal Effort/ symmetry movement CARDIOVASCULAR:  Normal rate rhythm  ABDOMEN: Non tender, non distended, no HSM noted. MUSCULOSKELETAL: no gross deformities  NEUROLOGIC: Alert and Oriented,  Cranial nerves II-XII are grossly intact. SKIN:  no bruising or bleeding, normal skin color,  no redness    Data:     Laboratory this visit:  Reviewed  Recent Labs     11/30/22  0600 12/01/22  1038 12/01/22 2041   WBC 3.4* 3.8* 4.4   HGB 8.6* 10.4* 10.2*   HCT 28.4* 33.3* 32.8*    179 203      Recent Labs     12/01/22  1038 12/01/22 2041    140   K 4.5 4.0    105   CO2 27 26   BUN 8 10   CREATININE 0.8 0.7     Recent Labs     12/01/22  1038 12/01/22 2041   AST 16 15   ALT 12 12   BILITOT 0.3 0.1   ALKPHOS 132* 122     Recent Labs     12/01/22  1038   INR 0.89     Recent Labs     12/01/22 2041   CKTOTAL 46     Invalid input(s): PRO-BNP    Radiology this visit:  Reviewed. XR KNEE LEFT (MIN 4 VIEWS)    Result Date: 11/26/2022  EXAMINATION: FOUR XRAY VIEWS OF THE LEFT KNEE 11/26/2022 10:33 am COMPARISON: None. HISTORY: ORDERING SYSTEM PROVIDED HISTORY: Fall, left knee pain TECHNOLOGIST PROVIDED HISTORY: Reason for exam:->Fall, left knee pain Reason for Exam: Fall, left knee pain Additional signs and symptoms: Fall, left knee pain Relevant Medical/Surgical History: Fall, left knee pain FINDINGS: Small suprapatellar bursal effusion. No radiographic evidence of acute fracture or dislocation. Multi compartment moderate degenerative arthritic change noted worst in the medial tibiofemoral joint space. No bone destruction, malignant-appearing periosteal reaction or other acute soft tissue abnormality. Small suprapatellar bursal effusion, multi compartment moderate degenerative arthritic change worst in the medial tibiofemoral joint space. No other acute radiographic abnormality left knee.      XR ABDOMEN (KUB) (SINGLE AP VIEW)    Result Date: 12/1/2022  EXAMINATION: ONE XRAY VIEW OF THE CHEST; ONE SUPINE XRAY VIEW(S) OF THE ABDOMEN 12/1/2022 8:02 pm COMPARISON: 06/23/2022 HISTORY: ORDERING SYSTEM PROVIDED HISTORY: sob TECHNOLOGIST PROVIDED HISTORY: Reason for exam:->sob Reason for Exam:  SOB Additional signs and symptoms: none Relevant Medical/Surgical History: COPD, GERD FINDINGS: Heart size is within normal limits. Mediastinum is normal. Lungs are normally expanded and clear. No free gas under the diaphragm. No small bowel dilation. Gas and feces throughout the colon. No displacement of the bowel loops. No large mass. Minimal spondylosis     Lungs are clear. Negative for bowel obstruction. CT ABDOMEN PELVIS W IV CONTRAST Additional Contrast? None    Result Date: 11/28/2022  EXAMINATION: CT OF THE ABDOMEN AND PELVIS WITH CONTRAST 11/26/2022 11:32 am TECHNIQUE: CT of the abdomen and pelvis was performed with the administration of intravenous contrast. Multiplanar reformatted images are provided for review. Automated exposure control, iterative reconstruction, and/or weight based adjustment of the mA/kV was utilized to reduce the radiation dose to as low as reasonably achievable. COMPARISON: CT 09/19/2022 HISTORY: ORDERING SYSTEM PROVIDED HISTORY: Upper abdominal pain TECHNOLOGIST PROVIDED HISTORY: Reason for exam:->Upper abdominal pain Additional Contrast?->None Decision Support Exception - unselect if not a suspected or confirmed emergency medical condition->Emergency Medical Condition ([place]) Reason for Exam: Upper abdominal pain FINDINGS: Lower Chest: Visualized lung bases demonstrate no acute abnormality. Organs: The liver, spleen, pancreas, adrenal glands, and kidneys demonstrate no acute abnormality. Prior cholecystectomy. Again seen is pneumobilia. Similar mild prominence of the common bile duct. Nonobstructing small intrarenal calculi. GI/Bowel: No evidence to suggest acute appendicitis. 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 Again seen is mild distention of the gastric pouch with ingested contents. Short-segment mildly dilated loop of small bowel is seen in the left mid abdomen. Mild to moderate stool burden is seen in the colon. Pelvis: The urinary bladder is unremarkable. Peritoneum/Retroperitoneum: No lymphadenopathy is seen. No intraperitoneal free air or significant free fluid. Bones/Soft Tissues: No acute bony abnormality is seen. Short segment mildly dilated loop of small bowel in the left mid abdomen. This is more prominent than the prior CT. This could represent an ileus or low-grade obstruction. 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 Again seen is mild distension of the gastric pouch with ingested contents. Prior cholecystectomy. Again seen is pneumobilia and mild prominence of the common bile duct. XR CHEST PORTABLE    Result Date: 12/1/2022  EXAMINATION: ONE XRAY VIEW OF THE CHEST; ONE SUPINE XRAY VIEW(S) OF THE ABDOMEN 12/1/2022 8:02 pm COMPARISON: 06/23/2022 HISTORY: ORDERING SYSTEM PROVIDED HISTORY: sob TECHNOLOGIST PROVIDED HISTORY: Reason for exam:->sob Reason for Exam:  SOB Additional signs and symptoms: none Relevant Medical/Surgical History: COPD, GERD FINDINGS: Heart size is within normal limits. Mediastinum is normal. Lungs are normally expanded and clear. No free gas under the diaphragm. No small bowel dilation. Gas and feces throughout the colon. No displacement of the bowel loops. No large mass. Minimal spondylosis     Lungs are clear. Negative for bowel obstruction. US ABDOMEN LIMITED Specify organ?  LIVER, GALLBLADDER, PANCREAS    Result Date: 11/30/2022  EXAMINATION: RIGHT UPPER QUADRANT ULTRASOUND 11/30/2022 8:19 am COMPARISON: CT abdomen and pelvis 1126 22 HISTORY: ORDERING SYSTEM PROVIDED HISTORY: Epigastric and ruq pain, mild distension of common bile duct seen on CT scan TECHNOLOGIST PROVIDED HISTORY: Reason for exam:->Epigastric and ruq pain, mild distension of common bile duct seen on CT scan Specify organ?->LIVER Specify organ?->GALLBLADDER Specify organ?->PANCREAS FINDINGS: LIVER:  The liver demonstrates normal echogenicity without evidence of intrahepatic biliary ductal dilatation. BILIARY SYSTEM:  Status post cholecystectomy. Common bile duct is within normal limits measuring 0.8 cm. RIGHT KIDNEY: The right kidney is grossly unremarkable without evidence of hydronephrosis. The right kidney measures 11 x 5.1 x 5 cm. PANCREAS:  Visualized portions of the pancreas are unremarkable. OTHER: No evidence of right upper quadrant ascites. Mildly dilated common bile duct measuring up to 0.8 cm likely secondary to cholecystectomy changes. US EXTREMITY LEFT NON VASC LIMITED    Result Date: 11/26/2022  EXAMINATION: NONVASCULAR ULTRASOUND OF THE LEFT EXTREMITY; VENOUS ULTRASOUND OF THE LEFT EXTREMITY 11/26/2022 10:45 am COMPARISON: 11/02/2022 09/19/2022 10/11/2022 HISTORY: ORDERING SYSTEM PROVIDED HISTORY: knee pain TECHNOLOGIST PROVIDED HISTORY: Reason for exam:->knee pain FINDINGS: Focused ultrasound demonstrates a complex collection in the suprapatellar region, measuring 9.4 x 5.8 x 8.5 cm. This has been described on prior studies. .  It appears increased in size compared to 09/19, but decreased in size in its greatest dimension compared to 10/11 Visualized veins are patent and free of thrombus. No DVT identified. Persistent complex collection in the suprapatellar region. This may relate to prior surgery or trauma. Correlate for signs of infection or bursitis. No DVT noted     VL DUP LOWER EXTREMITY VENOUS LEFT    Result Date: 11/26/2022  EXAMINATION: NONVASCULAR ULTRASOUND OF THE LEFT EXTREMITY; VENOUS ULTRASOUND OF THE LEFT EXTREMITY 11/26/2022 10:45 am COMPARISON: 11/02/2022 09/19/2022 10/11/2022 HISTORY: ORDERING SYSTEM PROVIDED HISTORY: knee pain TECHNOLOGIST PROVIDED HISTORY: Reason for exam:->knee pain FINDINGS: Focused ultrasound demonstrates a complex collection in the suprapatellar region, measuring 9.4 x 5.8 x 8.5 cm.   This has been described on prior studies. .  It appears increased in size compared to 09/19, but decreased in size in its greatest dimension compared to 10/11 Visualized veins are patent and free of thrombus. No DVT identified. Persistent complex collection in the suprapatellar region. This may relate to prior surgery or trauma. Correlate for signs of infection or bursitis. No DVT noted     VL LOWER EXTREMITY VENOUS LEFT    Result Date: 11/2/2022  EXAMINATION: DUPLEX VENOUS ULTRASOUND OF THE LEFT LOWER EXTREMITY 11/2/2022 4:07 pm TECHNIQUE: Duplex ultrasound using B-mode/gray scaled imaging and Doppler spectral analysis and color flow was obtained of the deep venous structures of the left extremity. COMPARISON: October 11, 2022 HISTORY: ORDERING SYSTEM PROVIDED HISTORY: Pain and swelling of left lower leg TECHNOLOGIST PROVIDED HISTORY: Reason for exam:->Possible DVT FINDINGS: The visualized veins of the left lower extremity are patent and free of echogenic thrombus. The veins demonstrate good compressibility with normal color flow study and spectral analysis. Evidence of knee effusion. No evidence of DVT in the left lower extremity.          Discharge Time of 45 minutes    Electronically signed by MICHAEL Rutherford CNP on 12/2/2022 at 11:39 AM

## 2022-12-02 NOTE — CODE DOCUMENTATION
Patient alert and responsive at this time. Stating she cannot breath. Non rebreather placed for comfort although oxygen level at 100%. She ripped her oxygen level off. She is screaming and crying. Bedside nurse coaching patient and attempting to calm her.

## 2022-12-03 LAB
ANTICARDIOLIPIN IGA ANTIBODY: <10 APL
ANTICARDIOLIPIN IGG ANTIBODY: <10 GPL
CARDIOLIPIN AB IGM: <10 MPL
COPPER: 182 UG/DL (ref 80–155)
FOLATE: 11.5 NG/ML (ref 3.1–17.5)
VITAMIN B-12: 1804 PG/ML (ref 211–911)
VITAMIN D 25-HYDROXY: 31.24 NG/ML
ZINC: 67.9 UG/DL (ref 60–120)

## 2022-12-04 LAB
PHOSPHATIDYLSERINE IGA ANTIBODY: 0 APS (ref 0–19)
PHOSPHATIDYLSERINE IGG ANTIBODY: 3 GPS (ref 0–15)
PHOSPHATIDYLSERINE IGM ANTIBODY: 4 MPS (ref 0–21)

## 2022-12-05 ENCOUNTER — TELEPHONE (OUTPATIENT)
Dept: INTERNAL MEDICINE CLINIC | Age: 52
End: 2022-12-05

## 2022-12-05 LAB
CULTURE: NORMAL
GRAM SMEAR: NORMAL
Lab: NORMAL
RETINYL PALMITATE: <0.02 MG/L (ref 0–0.1)
SPECIMEN: NORMAL
VITAMIN A LEVEL: 0.39 MG/L (ref 0.3–1.2)
VITAMIN A, INTERP: NORMAL
VITAMIN B1, PLASMA: 102 NMOL/L (ref 70–180)

## 2022-12-05 NOTE — TELEPHONE ENCOUNTER
Care Transitions Initial Follow Up Call    Outreach made within 2 business days of discharge: Yes    Patient: Bárbara Garcia Patient : 1970   MRN: [id number]  Reason for Admission: There are no discharge diagnoses documented for the most recent discharge. Discharge Date: 22       Spoke with: Andre    Discharge department/facility: [facility]    TCM Interactive Patient Contact:  Was patient able to fill all prescriptions: Yes  Was patient instructed to bring all medications to the follow-up visit: Yes  Is patient taking all medications as directed in the discharge summary?  Yes  Does patient understand their discharge instructions: Yes  Does patient have questions or concerns that need addressed prior to 7-14 day follow up office visit: no    Scheduled for 22 at 3 pm    Scheduled appointment with PCP within 7-14 days    Follow Up  Future Appointments   Date Time Provider Randy Monzon   2022  3:00 PM MD [facility]cherIELD PATSY CARNEY   2022  3:00 PM Elvia Marie PA-C [facility]   2023  3:15 PM Annette Walter DO [facility]       Winnie Willard

## 2022-12-06 LAB — RHEUMATOID FACTOR: 58 IU/ML (ref 0–14)

## 2022-12-08 ENCOUNTER — OFFICE VISIT (OUTPATIENT)
Dept: INTERNAL MEDICINE CLINIC | Age: 52
End: 2022-12-08

## 2022-12-08 VITALS
HEART RATE: 63 BPM | RESPIRATION RATE: 16 BRPM | OXYGEN SATURATION: 98 % | SYSTOLIC BLOOD PRESSURE: 116 MMHG | BODY MASS INDEX: 40.72 KG/M2 | WEIGHT: 237.2 LBS | DIASTOLIC BLOOD PRESSURE: 70 MMHG

## 2022-12-08 DIAGNOSIS — D50.0 IRON DEFICIENCY ANEMIA DUE TO CHRONIC BLOOD LOSS: ICD-10-CM

## 2022-12-08 DIAGNOSIS — K21.9 GASTROESOPHAGEAL REFLUX DISEASE WITHOUT ESOPHAGITIS: ICD-10-CM

## 2022-12-08 DIAGNOSIS — Z09 HOSPITAL DISCHARGE FOLLOW-UP: ICD-10-CM

## 2022-12-08 DIAGNOSIS — R11.0 CHRONIC NAUSEA: ICD-10-CM

## 2022-12-08 DIAGNOSIS — E66.01 MORBID OBESITY WITH BMI OF 40.0-44.9, ADULT (HCC): ICD-10-CM

## 2022-12-08 DIAGNOSIS — R10.9 ABDOMINAL PAIN, UNSPECIFIED ABDOMINAL LOCATION: Primary | ICD-10-CM

## 2022-12-08 DIAGNOSIS — K58.9 IRRITABLE BOWEL SYNDROME, UNSPECIFIED TYPE: ICD-10-CM

## 2022-12-08 DIAGNOSIS — E03.4 HYPOTHYROIDISM DUE TO ACQUIRED ATROPHY OF THYROID: ICD-10-CM

## 2022-12-08 DIAGNOSIS — M25.562 CHRONIC PAIN OF LEFT KNEE: ICD-10-CM

## 2022-12-08 DIAGNOSIS — K56.7 ILEUS (HCC): ICD-10-CM

## 2022-12-08 DIAGNOSIS — J44.9 CHRONIC OBSTRUCTIVE PULMONARY DISEASE, UNSPECIFIED COPD TYPE (HCC): ICD-10-CM

## 2022-12-08 DIAGNOSIS — M32.9 SYSTEMIC LUPUS ERYTHEMATOSUS, UNSPECIFIED SLE TYPE, UNSPECIFIED ORGAN INVOLVEMENT STATUS (HCC): Chronic | ICD-10-CM

## 2022-12-08 DIAGNOSIS — F32.A CHRONIC DEPRESSION: ICD-10-CM

## 2022-12-08 DIAGNOSIS — M00.862 ARTHRITIS OF LEFT KNEE DUE TO OTHER BACTERIA (HCC): ICD-10-CM

## 2022-12-08 DIAGNOSIS — G89.29 CHRONIC PAIN OF LEFT KNEE: ICD-10-CM

## 2022-12-08 DIAGNOSIS — I10 PRIMARY HYPERTENSION: ICD-10-CM

## 2022-12-08 DIAGNOSIS — M05.9 RHEUMATOID ARTHRITIS WITH POSITIVE RHEUMATOID FACTOR, INVOLVING UNSPECIFIED SITE (HCC): ICD-10-CM

## 2022-12-08 LAB
FACTOR VIII ACTIVITY: 131 % (ref 56–191)
RISTOCETIN CO-FACTOR: 117 % (ref 51–215)
VON WILLEBRAND AG: 167 % (ref 52–214)

## 2022-12-08 RX ORDER — PROMETHAZINE HYDROCHLORIDE 25 MG/1
25 TABLET ORAL EVERY 8 HOURS PRN
Qty: 30 TABLET | Refills: 0 | Status: SHIPPED | OUTPATIENT
Start: 2022-12-08 | End: 2022-12-18

## 2022-12-08 NOTE — PROGRESS NOTES
Post-Discharge Transitional Care  Follow Up      Mitch Pope   YOB: 1970    Date of Office Visit:  12/8/2022  Date of Hospital Admission: 11/26/22  Date of Hospital Discharge: 12/2/22  Risk of hospital readmission (high >=14%. Medium >=10%) :Readmission Risk Score: 24.9      Care management risk score Rising risk (score 2-5) and Complex Care (Scores >=6): No Risk Score On File     Non face to face  following discharge, date last encounter closed (first attempt may have been earlier): 12/05/2022    Call initiated 2 business days of discharge: Yes    ASSESSMENT/PLAN:   Abdominal pain, unspecified abdominal location  Improving. Still with mild chronic abdominal pain. If pain getting worse she should call us. She did see Dr. Kurtis Louis also diarrhea hospitalization and he recommended her to eat small meals as she gets pain with larger meals as she had gastric bypass surgery in the past.    Ileus ([facility]  Improved. She is having some diarrhea now but not bad. Chronic pain of left knee. She had hemarthrosis and septic arthritis. Treated and improved. She sees pain management doctor and is on Percocet and Neurontin. Arthritis of left knee due to other bacteria [facility])  She had septic arthritis in September 2022. Okay  As mentioned above she was treated with antibiotics and improved    Morbid obesity with BMI of 40.0-44.9, adult ([facility] [address] Advised diet, exercise and weight loss and advised to continue to follow with her bariatric surgeon as outpatient    Systemic lupus erythematosus, unspecified SLE type, unspecified organ involvement status ([facility] [address]  History of lupus and rheumatoid arthritis  Will refer to rheumatologist.    Irritable bowel syndrome, unspecified type  Better. Not on any medication for it at this time. Iron deficiency anemia due to chronic blood loss. Was seen by dermatologist, Dr. Mark Mata during hospitalization and got Venofer IV during hospitalization.   Hemoglobin is better now.  Advised to continue to follow with her. Continue ferrous sulfate tablets    Hypothyroidism due to acquired atrophy of thyroid  Continue Synthroid    Primary hypertension  BP is better. She is not on any medication for blood pressure at this time. Advised low-salt diet    Gastroesophageal reflux disease without esophagitis  Continue Protonix    Chronic obstructive pulmonary disease, unspecified COPD type ([facility] [address]  Continue albuterol HFA as needed    Chronic depression  Patient on Paxil    Chronic nausea  Promethazine 25 mg every 8 hours as needed  Also can take Zofran as needed    Hospital discharge follow-up  -     [place] DISCHARGE MEDS RECONCILED W/ CURRENT OUTPATIENT MED LIST      Medical Decision Making: high complexity  No follow-ups on file. On this date 12/8/2022 I have spent 35 minutes reviewing previous notes, test results and face to face with the patient discussing the diagnosis and importance of compliance with the treatment plan as well as documenting on the day of the visit. Subjective:   HPI:  Follow up of Hospital problems/diagnosis(es):     She was admitted to the Hospital at [facility] admitted from 11/26/2022-12/2/2022. She was admitted for abdominal pain , nausea and vomiting and diarrhea.                US (11/30) Mildly dilated common bile duct measuring up to 0.8 cm likely secondary to cholecystectomy changes. KUB (12/1) non acute. CT of the abdomen and pelvis on 11/26/2022 showed:    Short segment mildly dilated loop of small bowel in the left mid abdomen. This is more prominent than the prior CT. This could represent an ileus or   low-grade obstruction. 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 Again seen is mild   distension of the gastric pouch with ingested contents. Prior cholecystectomy. Again seen is pneumobilia and mild prominence of the   common bile duct. Pain improved  gradually.   She was moving bowels but was having diarrhea. Vomiting improved but she has chronic nausea. She claims Zofran does not help her nausea but phenergan helps, wants phenergan prescribed. She recently had septic arthritis of left knee in September 2022 and had orthopedic irrigation and debridement and drain placed and was treated with IV antibiotic and was on IV ATBs at home which was completed recently about 3-4 days prior to hospitalization. [facility] She was being followed by Dr. Akira García and Dr. Bettina Dior. Dr. Bettina Dior saw her in hospital and he told her  that her septic arthritis has healed. She has history of Rheumatoid arthritis and Lupus diagnosed in [place] several years ago. Complains of generalized aches and pains. She wants to see a rheumatologist for further recommendations. Complains of fatigue and complains of chronic aches and pains. She also has a history of pseudoseizure disorder. No recent seizures        Inpatient course: Discharge summary reviewed- see chart. Interval history/Current status:     Currently doing little better. Denies any chest pain or shortness of breath. Still has abdominal pain mostly in the epigastric area    She has chronic nausea and takes Phenergan or Zofran frequently. She claims Zofran does not help her much and she wants Phenergan prescribed  She has history recurrent pancreatitis but none since several months. Bowels are moving okay but occasionally has diarrhea now. No urinary symptoms. Hearing is good. Vision okay with glasses. Denies any significant skin lesions. She is going to see her neurologist soon for recurrent seizures. She carries a diagnosis of pseudoseizures. Continues to have depression and anxiety. Medications partially helps her. She has been fully vaccinated for COVID-19 including the booster dose. Labs and test from the hospitalization were reviewed and explained to patient.     Patient Active Problem List   Diagnosis    Fever    Fibromyalgia    Lupus betamethasone valerate 0.1 % ointment  Commonly known as: VALISONE     estradiol 0.5 MG tablet  Commonly known as: ESTRACE  Take 1 tablet by mouth daily     ferrous sulfate 325 (65 Fe) MG tablet  Commonly known as: IRON 325  Take 1 tablet by mouth 2 times daily     gabapentin 800 MG tablet  Commonly known as: NEURONTIN  Take 1 tablet by mouth 3 times daily for 4 days. hydrOXYzine HCl 50 MG tablet  Commonly known as: ATARAX     levETIRAcetam 1000 MG tablet  Commonly known as: KEPPRA  Take 1 tablet by mouth twice daily     levothyroxine 125 MCG tablet  Commonly known as: SYNTHROID  Take 1 tablet by mouth Daily     Melatonin 10 MG Tabs     Narcan 4 MG/0.1ML Liqd nasal spray  Generic drug: naloxone     ondansetron 4 MG disintegrating tablet  Commonly known as: ZOFRAN-ODT  Take 1 tablet by mouth 3 times daily as needed for Nausea or Vomiting     pantoprazole 40 MG tablet  Commonly known as: PROTONIX  Take 1 tablet by mouth twice daily     PARoxetine 30 MG tablet  Commonly known as: PAXIL  TAKE 1 & 1/2 (ONE & ONE-HALF) TABLETS BY MOUTH NIGHTLY     predniSONE 20 MG tablet  Commonly known as: DELTASONE  Take 2 tablets by mouth daily for 10 days     promethazine 25 MG tablet  Commonly known as: PHENERGAN  Take 1 tablet by mouth 4 times daily as needed for Nausea     tiZANidine 4 MG tablet  Commonly known as: ZANAFLEX     VITAMIN B 12 PO     Vitamin D3 125 MCG (5000 UT) Tabs           * This list has 2 medication(s) that are the same as other medications prescribed for you. Read the directions carefully, and ask your doctor or other care provider to review them with you.                     Medications marked \"taking\" at this time  Outpatient Medications Marked as Taking for the 12/8/22 encounter (Office Visit) with Nicole eBasley MD   Medication Sig Dispense Refill    predniSONE (DELTASONE) 20 MG tablet Take 2 tablets by mouth daily for 10 days 20 tablet 0    promethazine (PHENERGAN) 25 MG tablet Take 1 tablet by mouth 4 times daily as needed for Nausea 20 tablet 0    gabapentin (NEURONTIN) 800 MG tablet Take 1 tablet by mouth 3 times daily for 4 days. 12 tablet 0    estradiol (ESTRACE) 0.5 MG tablet Take 1 tablet by mouth daily 30 tablet 1    ondansetron (ZOFRAN-ODT) 4 MG disintegrating tablet Take 1 tablet by mouth 3 times daily as needed for Nausea or Vomiting 30 tablet 1    levETIRAcetam (KEPPRA) 1000 MG tablet Take 1 tablet by mouth twice daily 60 tablet 3    levothyroxine (SYNTHROID) 125 MCG tablet Take 1 tablet by mouth Daily 30 tablet 2    pantoprazole (PROTONIX) 40 MG tablet Take 1 tablet by mouth twice daily 180 tablet 0    hydrOXYzine HCl (ATARAX) 50 MG tablet Take 50 mg by mouth 2 times daily      PARoxetine (PAXIL) 30 MG tablet TAKE 1 & 1/2 (ONE & ONE-HALF) TABLETS BY MOUTH NIGHTLY 135 tablet 0    ferrous sulfate (IRON 325) 325 (65 Fe) MG tablet Take 1 tablet by mouth 2 times daily 180 tablet 1    albuterol sulfate HFA (PROVENTIL;VENTOLIN;PROAIR) 108 (90 Base) MCG/ACT inhaler Inhale 2 puffs into the lungs 4 times daily 1 each 3    albuterol (PROVENTIL) (2.5 MG/3ML) 0.083% nebulizer solution Take 3 mLs by nebulization every 6 hours as needed for Wheezing 120 each 0    NARCAN 4 MG/0.1ML LIQD nasal spray USE AS DIRECTED AS NEEDED FOR SUSPECTED OPIOID OVERDOSE      Melatonin 10 MG TABS Take 10-20 mg by mouth nightly as needed      Cyanocobalamin (VITAMIN B 12 PO) Take 1 tablet by mouth daily      betamethasone valerate (VALISONE) 0.1 % ointment APPLY OINTMENT TO AFFECTED AREA TWICE DAILY TO HANDS AND ELBOWS FOR 21 DAYS      tiZANidine (ZANAFLEX) 4 MG tablet Take 4 mg by mouth every 8 hours as needed       Cholecalciferol (VITAMIN D3) 5000 units TABS Take 1 tablet by mouth daily          Medications patient taking as of now reconciled against medications ordered at time of hospital discharge: Yes    A comprehensive review of systems was negative except for what was noted in the HPI.     Objective:    /70   Pulse 63 Resp 16   Wt 237 lb 3.2 oz (107.6 kg)   SpO2 98%   BMI 40.72 kg/m²       GENERAL APPEARANCE:      Alert, oriented x 3, well developed, cooperative, not in any distress, appears stated age. HEAD:                         Normocephalic, atraumatic   EYES:                          PERRLA, EOMI, lids normal, conjuctivea clear, sclera anicteric. NECK:                         Supple, symmetrical,  trachea midline, no thyromegaly, no JVD, no lymphadenopathy. LUNGS:                       Clear to auscultation bilaterally, respirations unlabored, accessory muscles are not used. HEART:                       Regular rate and rhythm, S1 and S2 normal, no murmur, rub or gallop. PMI in MCL. ABDOMEN:                 Soft, mild tenderness in epigastric area, bowel sounds are normoactive, no masses, no hepatospleenomegaly. Patient is obese. EXTREMITY:              Mild tenderness and swelling of the left knee. NEURO:                      Alert, oriented to person, place and time. Grossly intact. Musculoskeletal:         No kyphosis or scoliosis, mild tenderness in her lower back and also in multiple joints, chronic. Skin:                            Warm and dry. No rash or obvious suspicious lesions. PSYCH:                       Mood euthymic, insight and judgement good. Care discussed with patient. Questions answered and patient verbalizes understanding and agrees with plan. Medications reviewed and reconciled. Continue current medications. Appropriate prescriptions are ordered. Risks and benefits of meds are discussed. After visit summary provided. Advised to call for any problems, questions, or concerns. If symptoms worsen or don't improve as expected, to call us or go to ER. Follow up as directed, sooner if needed. Return in about 6 weeks (around 12/21/2022).      This dictation was performed with a verbal recognition program and it was checked for errors. It is possible that there are still dictated errors within this office note. Any errors should be brought immediately to my attention for correction. All efforts were made to ensure that this office note is accurate. An electronic signature was used to authenticate this note.   --Ilir Guerra MD

## 2022-12-09 ENCOUNTER — TELEPHONE (OUTPATIENT)
Dept: INTERNAL MEDICINE CLINIC | Age: 52
End: 2022-12-09

## 2022-12-09 RX ORDER — SCOLOPAMINE TRANSDERMAL SYSTEM 1 MG/1
1 PATCH, EXTENDED RELEASE TRANSDERMAL
Qty: 10 PATCH | Refills: 0 | Status: SHIPPED | OUTPATIENT
Start: 2022-12-09 | End: 2023-01-09

## 2023-01-04 DIAGNOSIS — R11.0 NAUSEA: ICD-10-CM

## 2023-01-05 RX ORDER — ONDANSETRON 4 MG/1
4 TABLET, ORALLY DISINTEGRATING ORAL 3 TIMES DAILY PRN
Qty: 30 TABLET | Refills: 1 | Status: SHIPPED | OUTPATIENT
Start: 2023-01-05

## 2023-01-09 RX ORDER — SCOLOPAMINE TRANSDERMAL SYSTEM 1 MG/1
PATCH, EXTENDED RELEASE TRANSDERMAL
Qty: 10 PATCH | Refills: 0 | Status: SHIPPED | OUTPATIENT
Start: 2023-01-09

## 2023-01-09 NOTE — PLAN OF CARE
Instruct patient to do deep breathing exercise during waking hours and offer distraction such as television. Explain the importance of right time medication administration and to watch for any adverse effects. Assess patient's pain level accordingly.   Problem: Pain  Goal: Verbalizes/displays adequate comfort level or baseline comfort level  9/28/2022 1831 by Alecia Elias  Outcome: Progressing  9/28/2022 1106 by Tamie Whitman  Outcome: Progressing Mr. Prescott is a 58 yoM with PMH including total colectomy with ileorectal anastomosis for polyposis in 2018 who presented to the ED on 12/30 with 1 week of worsening bloating, abdominal pain,and nausea/vomiting. Workup in the ED revealed a CT scan significant for potential transition point of the ileum in the RLQ with ileal distension and decompression of the proximal small bowel and stomach. He was admitted and passed a GGC challenge and was discharged on 12/31.    He re presents today with continued bloating, abdominal pain, nausea, and emesis. Repeat CT scan on 1/9 demonstrated mesenteric swirling about the ileorectal anastomosis with positioning of the small bowel in the right quadrant (vs the left on 12/30). No fever, chills, shortness of breath, or chest pain.

## 2023-01-12 NOTE — PROGRESS NOTES
Patient Name: Mague Lowery  Patient : 1970  Patient MRN: [id number]     Primary Oncologist: Doron Tai MD  Referring Provider: Irving Mireles MD    Date of Service: 2023      She came in for follow up visit. Chief Complaint:   Chief Complaint   Patient presents with    Follow-up      Problem List: Patient Active Problem List:     Fibromyalgia     Lupus (systemic lupus erythematosus) ([facility] [address]     Chronic pancreatitis ([facility] [address]     HTN (hypertension)     Generalized abdominal pain     Frequent UTI     Gastroesophageal reflux disease without esophagitis     Depression     Fatty liver disease, nonalcoholic     Arthritis     Bilateral low back pain with left-sided sciatica     Intractable vomiting with nausea     Hiatal hernia     Status post laparoscopic sleeve gastrectomy     Pseudoseizure     Chronic pain syndrome     Drug-seeking/Aberrant behavior     Lymph node disorder     Morbid obesity with BMI of 40.0-44.9, adult ([address]     Iron deficiency anemia secondary to blood loss (chronic)     Encounter for weight management     Intractable nausea and vomiting     Seizure (HCC)     Abdominal pain     Anxiety     RUQ pain     Intractable vomiting     Status post bariatric surgery     Morbid obesity with BMI of 45.0-49.9, adult ([address]     Discoloration of skin of flank resembling ecchymosis     Morbid obesity with BMI of 50.0-59.9, adult ([address]     Chest pain of uncertain etiology     Cellulitis of left thigh    HPI:   Nicholas Siddiqui is a pleasant 45 y.o.  female patient with significant past medical history of gastric ulcer/AVM who presented to ER in 2018  with abdominal pain. On her admission she had normal CBC and Hg dropped during her stay. She had multiple EGD's in the past. Colonoscopy was several years ago. EGD X2 on this admission showed erosive gastritis with bright blood in the lumen. She received 4 units PRBC. She has underlying SLE/connective tissue disorder.  She was followed by surgeon for potential bariatric surgery. She had h/o of MRSA 2 years ago. CT abdomen in 8/2018:  1. No acute intra-abdominal abnormality to account for the patient's symptoms. 2. Status post cholecystectomy with pneumobilia, likely related to prior sphincterotomy. 3. Status post hysterectomy and appendectomy. She has positive RF. Acute viral hepatitis serology in November 2018 was negative. 8/2018 Final Pathologic Diagnosis: Stomach, antrum, biopsy: - Mild chronic gastritis (see comment, see stains report). - Helicobacter pylori microorganisms are not identified. LDH and haptoglobin were within normal limits. September 2018 normal WBC, and platelet. Hg was ~11. She was on oral Iron before. She started taking iron pill since. Hemoglobin has improved. Anemia has been corrected. WBC and platelet within normal limits. February 12, 2019 she had gastric sleeve surgery and lost ~ 50 lbs. 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 She was  placed on IV antibitotic. Hg dropped slowly since than to ~ 8 mg/dL. She takes oral Iron. She is on paxil for depression. September and October 2019 she had Iron infusion. October 2019 ferritin and hemoglobin improved. December 2, 2019 normal hemoglobin, white cell and platelets. October 2019 she had upper endoscopic study which showed normal study. Pathology report showed slight chronic inflammation and negative H. pylori. 10/2019 CT abdomen pelvis showed no acute abnormality. She had MRSA bacteremia and mediport was removed from Right anterior chest wall in 2020.    1/19/2023 she came in for follow up visit. 11/2022 she was hospitalized due to abdominal pain. 11/26/2022 CT abdomen pelvis with ileus versus low-grade obstruction. Treated conservatively by surgeon. Abdominal ultrasound with mildly dilated common bile duct measuring up to 0.8 cm likely secondary to cholecystectomy changes. Dr Erinn Hernández did not recommend EGD or colonoscopy.   She received venofer and was discharged home with Slow Fe.  12/1/2022 B-12 1804. Folate 11.5. Zinc wnl. Copper 182H ? Infection. Von Willebrand panel unremarkable. 12/2022 CMP grossly unremarkable with random . Hg 10.2, MCV 81. Anti cardiolipin IgG and IgM Abs wnl. Phosphatidylserine IgG and IgM wnl. She has intermittent nausea and upper abdominal pain. 1/27/2023 she will see Dr Jeannene Goldberg. I prescribed phenergan for nausea. Denied any nausea, vomiting or diarrhea. No fever or chills. No chest pain, shortness of breath or palpitation. No headache or dizzy spell. No specific bone pain. No melena or hematochezia. Denied any dysuria or hematuria. Past Medical History:  Anemia, acid reflux, anxiety, osteoarthritis, bleeding stomach ulcer in 2014, chronic back pain, depression, fibromyalgia, multiple blood transfusions, hypertension, lupus, chronic pancreatitis, obesity, thyroid disease. Past Surgical History:  Appendectomy in February 1998, cardiac catheter in October 2010, C-sections in August 1991, laparoscopic cholecystectomy in the 90, colonoscopy, upper endoscopic study. Foot surgery, partial hysterectomy in October 1997, partial L lung removal for benign tumor in 2008. Tube and ovary removal in February 1998, tonsillectomy and adenoidectomy in 1975. Social history:  She denies any alcohol use. Denies any history of smoking. Denies any illicit drug use. She has 2 children. Family history:  Daughter has lupus, maternal aunt had breast cancer at age 52, maternal grandmother had stomach cancer at age 80 and maternal grandfather had lung cancer. Review of Systems: \"Per interval history; otherwise 10 point ROS is negative. \"     Vital Signs:  BP (!) 132/58 (Site: Right Upper Arm, Position: Sitting, Cuff Size: Large Adult)   Pulse 65   Temp 97.3 °F (36.3 °C) (Infrared)   Resp 18   Ht 5' 4\" (1.626 m)   Wt 233 lb (105.7 kg)   SpO2 97%   BMI 39.99 kg/m²     Physical Exam:    CONSTITUTIONAL: awake, alert, cooperative, no apparent distress   EYES: sclera clear and conjunctiva normal. Pupils equal and round. ENT: Normocephalic, without obvious abnormality, atraumatic  NECK: supple, symmetrical, no jugular venous distension and no carotid bruits   HEMATOLOGIC/LYMPHATIC: no cervical, supraclavicular or axillary lymphadenopathy   LUNGS: no increased work of breathing and clear to auscultation   CARDIOVASCULAR: regular rate and rhythm, normal S1 and S2, no murmur noted  ABDOMEN: normal bowel sound, soft, non-distended, non-tender, no masses palpated, no rebound or guarding. MUSCULOSKELETAL: full range of motion noted, tone is normal  NEUROLOGIC: Motor skills grossly intact. CN II - XII grossly intac  SKIN: Normal skin color, texture, turgor and no jaundice. EXTREMITIES: no LE edema or cyanosis.     Labs:    Hematology:  Lab Results   Component Value Date    WBC 3.7 (L) 01/19/2023    RBC 4.04 (L) 01/19/2023    HGB 10.4 (L) 01/19/2023    HCT 33.3 (L) 01/19/2023    MCV 82.4 01/19/2023    MCH 25.7 (L) 01/19/2023    MCHC 31.2 (L) 01/19/2023    RDW 15.0 (H) 01/19/2023     01/19/2023    MPV 9.6 01/19/2023    BANDSPCT 7 09/27/2022    SEGSPCT 54.8 01/19/2023    EOSRELPCT 6.3 (H) 01/19/2023    BASOPCT 0.5 01/19/2023    LYMPHOPCT 31.3 01/19/2023    MONOPCT 7.1 (H) 01/19/2023    BANDABS 0.34 09/27/2022    SEGSABS 2.0 01/19/2023    EOSABS 0.2 01/19/2023    BASOSABS 0.0 01/19/2023    LYMPHSABS 1.2 01/19/2023    MONOSABS 0.3 01/19/2023    DIFFTYPE AUTOMATED DIFFERENTIAL 01/19/2023    ANISOCYTOSIS 1+ 09/27/2022    POLYCHROM 1+ 08/05/2018    WBCMORP OCCASIONAL 09/27/2022    PLTM ADEQUATE 08/24/2021     Lab Results   Component Value Date    ESR 16 11/29/2022     Chemistry:  Lab Results   Component Value Date     12/01/2022    K 4.0 12/01/2022     12/01/2022    CO2 26 12/01/2022    BUN 10 12/01/2022    CREATININE 0.7 12/01/2022    GLUCOSE 109 (H) 12/01/2022    CALCIUM 10.1 12/01/2022    PROT 6.4 12/01/2022    LABALBU 4.1 12/01/2022    BILITOT 0.1 12/01/2022    ALKPHOS 122 12/01/2022    AST 15 12/01/2022    ALT 12 12/01/2022    LABGLOM >60 12/01/2022    GFRAA >60 10/11/2022    PHOS 3.9 11/09/2022    MG 1.7 (L) 05/27/2022    POCGLU 97 12/01/2022     Lab Results   Component Value Date     08/05/2018     Lab Results   Component Value Date    TSHHS 4.560 (H) 01/19/2023    T4FREE 1.31 10/02/2022    FT3 2.0 (L) 10/02/2022     Immunology:  Lab Results   Component Value Date    PROT 6.4 12/01/2022    ALBUMINELP 2.2 (L) 11/18/2015    LABALPH 0.6 (H) 11/18/2015    LABALPH 1.3 (H) 11/18/2015    LABBETA 1.1 11/18/2015    GAMGLOB 0.8 11/18/2015     Coagulation Panel:  Lab Results   Component Value Date    PROTIME 11.5 (L) 12/01/2022    INR 0.89 12/01/2022    APTT 37.7 (H) 12/01/2022    DDIMER 1048 (H) 01/13/2020     Anemia Panel:  Lab Results   Component Value Date    QNFMWQEI03 2344 (H) 12/01/2022    FOLATE 11.5 12/01/2022     Observations:  Performance Status: 0  Normal activity, fully active, able to carry on all pre-disease performance without restriction. Depression Status: PHQ-9 Total Score: 0 (1/19/2023  2:54 PM)    Cognitive Status: Oriented to person, time, and place     Assessment & Plan:  1. She has history of UGIB related to AVM/gastritis. H pylori was negative. She received 4 units of PRBC in August 2018. CBC in September 2018 showed improvement. She takes daily iron pill. Blood tests in January 2019 showed normal CBC. Ferritin was 45. She had Iron infusion in September and October 2019. CBC on December 2, 2019 was unremarkable. 11/2022 she had venofer in the hospital.    I will check CBC, CMP and iron studies today. She is advised to call for result. 2. She had mediport replacement in September 2018 due to poor peripheral access. 3. She had gastric sleeve surgery on November 12, 2019.    4. She had mammogram in 2/2020.  9/2020 EGD by [facility].  Colonoscopy with biopsy was performed on 8/30/2020 with findings of poor prep exam and diverticulosis  at [facility]. I remind her about mammogram.    5. She has connective tissue disorder. + RF. She has intermittent leukopenia, likely related to autoimmune disorder. Return to clinic in 3 months or sooner. All of her question has been answered for today. Recent imaging and labs were reviewed and discussed with the patient.

## 2023-01-19 ENCOUNTER — HOSPITAL ENCOUNTER (OUTPATIENT)
Dept: INFUSION THERAPY | Age: 53
Discharge: HOME | End: 2023-01-19
Payer: COMMERCIAL

## 2023-01-19 ENCOUNTER — OFFICE VISIT (OUTPATIENT)
Dept: ONCOLOGY | Age: 53
End: 2023-01-19
Payer: COMMERCIAL

## 2023-01-19 VITALS
SYSTOLIC BLOOD PRESSURE: 132 MMHG | HEIGHT: 64 IN | TEMPERATURE: 97.3 F | HEART RATE: 65 BPM | OXYGEN SATURATION: 97 % | DIASTOLIC BLOOD PRESSURE: 58 MMHG | RESPIRATION RATE: 18 BRPM | BODY MASS INDEX: 39.78 KG/M2 | WEIGHT: 233 LBS

## 2023-01-19 DIAGNOSIS — D50.9 IRON DEFICIENCY ANEMIA, UNSPECIFIED IRON DEFICIENCY ANEMIA TYPE: ICD-10-CM

## 2023-01-19 DIAGNOSIS — Z95.828 PORT-A-CATH IN PLACE: Primary | ICD-10-CM

## 2023-01-19 DIAGNOSIS — D50.9 IRON DEFICIENCY ANEMIA, UNSPECIFIED IRON DEFICIENCY ANEMIA TYPE: Primary | ICD-10-CM

## 2023-01-19 LAB
BASOPHILS ABSOLUTE: 0 K/CU MM
BASOPHILS RELATIVE PERCENT: 0.5 % (ref 0–1)
DIFFERENTIAL TYPE: ABNORMAL
EOSINOPHILS ABSOLUTE: 0.2 K/CU MM
EOSINOPHILS RELATIVE PERCENT: 6.3 % (ref 0–3)
FERRITIN: 59 NG/ML (ref 15–150)
HCT VFR BLD CALC: 33.3 % (ref 37–47)
HEMOGLOBIN: 10.4 GM/DL (ref 12.5–16)
IRON: 50 UG/DL (ref 37–145)
LYMPHOCYTES ABSOLUTE: 1.2 K/CU MM
LYMPHOCYTES RELATIVE PERCENT: 31.3 % (ref 24–44)
MCH RBC QN AUTO: 25.7 PG (ref 27–31)
MCHC RBC AUTO-ENTMCNC: 31.2 % (ref 32–36)
MCV RBC AUTO: 82.4 FL (ref 78–100)
MONOCYTES ABSOLUTE: 0.3 K/CU MM
MONOCYTES RELATIVE PERCENT: 7.1 % (ref 0–4)
PCT TRANSFERRIN: 18 % (ref 10–44)
PDW BLD-RTO: 15 % (ref 11.7–14.9)
PLATELET # BLD: 186 K/CU MM (ref 140–440)
PMV BLD AUTO: 9.6 FL (ref 7.5–11.1)
RBC # BLD: 4.04 M/CU MM (ref 4.2–5.4)
SEGMENTED NEUTROPHILS ABSOLUTE COUNT: 2 K/CU MM
SEGMENTED NEUTROPHILS RELATIVE PERCENT: 54.8 % (ref 36–66)
TOTAL IRON BINDING CAPACITY: 278 UG/DL (ref 250–450)
TSH HIGH SENSITIVITY: 4.56 UIU/ML (ref 0.27–4.2)
UNSATURATED IRON BINDING CAPACITY: 228 UG/DL (ref 110–370)
WBC # BLD: 3.7 K/CU MM (ref 4–10.5)

## 2023-01-19 PROCEDURE — 3075F SYST BP GE 130 - 139MM HG: CPT | Performed by: INTERNAL MEDICINE

## 2023-01-19 PROCEDURE — 36591 DRAW BLOOD OFF VENOUS DEVICE: CPT

## 2023-01-19 PROCEDURE — 3017F COLORECTAL CA SCREEN DOC REV: CPT | Performed by: INTERNAL MEDICINE

## 2023-01-19 PROCEDURE — 84443 ASSAY THYROID STIM HORMONE: CPT

## 2023-01-19 PROCEDURE — G8484 FLU IMMUNIZE NO ADMIN: HCPCS | Performed by: INTERNAL MEDICINE

## 2023-01-19 PROCEDURE — G8427 DOCREV CUR MEDS BY ELIG CLIN: HCPCS | Performed by: INTERNAL MEDICINE

## 2023-01-19 PROCEDURE — 1036F TOBACCO NON-USER: CPT | Performed by: INTERNAL MEDICINE

## 2023-01-19 PROCEDURE — 85025 COMPLETE CBC W/AUTO DIFF WBC: CPT

## 2023-01-19 PROCEDURE — G8417 CALC BMI ABV UP PARAM F/U: HCPCS | Performed by: INTERNAL MEDICINE

## 2023-01-19 PROCEDURE — 6360000002 HC RX W HCPCS

## 2023-01-19 PROCEDURE — 99211 OFF/OP EST MAY X REQ PHY/QHP: CPT

## 2023-01-19 PROCEDURE — 3078F DIAST BP <80 MM HG: CPT | Performed by: INTERNAL MEDICINE

## 2023-01-19 PROCEDURE — 99213 OFFICE O/P EST LOW 20 MIN: CPT | Performed by: INTERNAL MEDICINE

## 2023-01-19 PROCEDURE — 2580000003 HC RX 258: Performed by: INTERNAL MEDICINE

## 2023-01-19 PROCEDURE — 83540 ASSAY OF IRON: CPT

## 2023-01-19 PROCEDURE — 83550 IRON BINDING TEST: CPT

## 2023-01-19 PROCEDURE — 82728 ASSAY OF FERRITIN: CPT

## 2023-01-19 PROCEDURE — 84480 ASSAY TRIIODOTHYRONINE (T3): CPT

## 2023-01-19 RX ORDER — SODIUM CHLORIDE 0.9 % (FLUSH) 0.9 %
5-40 SYRINGE (ML) INJECTION PRN
Status: DISCONTINUED | OUTPATIENT
Start: 2023-01-19 | End: 2023-01-20 | Stop reason: HOSPADM

## 2023-01-19 RX ORDER — HEPARIN SODIUM (PORCINE) LOCK FLUSH IV SOLN 100 UNIT/ML 100 UNIT/ML
500 SOLUTION INTRAVENOUS PRN
Status: DISCONTINUED | OUTPATIENT
Start: 2023-01-19 | End: 2023-01-20 | Stop reason: HOSPADM

## 2023-01-19 RX ORDER — SODIUM CHLORIDE 9 MG/ML
25 INJECTION, SOLUTION INTRAVENOUS PRN
Status: CANCELLED | OUTPATIENT
Start: 2023-01-19

## 2023-01-19 RX ORDER — HEPARIN SODIUM (PORCINE) LOCK FLUSH IV SOLN 100 UNIT/ML 100 UNIT/ML
SOLUTION INTRAVENOUS
Status: DISCONTINUED
Start: 2023-01-19 | End: 2023-01-20 | Stop reason: HOSPADM

## 2023-01-19 RX ORDER — PROMETHAZINE HYDROCHLORIDE 25 MG/1
25 TABLET ORAL 3 TIMES DAILY PRN
Qty: 20 TABLET | Refills: 1 | Status: SHIPPED | OUTPATIENT
Start: 2023-01-19 | End: 2023-01-26

## 2023-01-19 RX ORDER — SODIUM CHLORIDE 9 MG/ML
25 INJECTION, SOLUTION INTRAVENOUS PRN
OUTPATIENT
Start: 2023-01-19

## 2023-01-19 RX ORDER — SODIUM CHLORIDE 0.9 % (FLUSH) 0.9 %
5-40 SYRINGE (ML) INJECTION PRN
OUTPATIENT
Start: 2023-01-19

## 2023-01-19 RX ORDER — HEPARIN SODIUM (PORCINE) LOCK FLUSH IV SOLN 100 UNIT/ML 100 UNIT/ML
500 SOLUTION INTRAVENOUS PRN
OUTPATIENT
Start: 2023-01-19

## 2023-01-19 RX ADMIN — HEPARIN SODIUM (PORCINE) LOCK FLUSH IV SOLN 100 UNIT/ML 500 UNITS: 100 SOLUTION at 15:44

## 2023-01-19 RX ADMIN — SODIUM CHLORIDE, PRESERVATIVE FREE 20 ML: 5 INJECTION INTRAVENOUS at 15:44

## 2023-01-19 ASSESSMENT — PATIENT HEALTH QUESTIONNAIRE - PHQ9
1. LITTLE INTEREST OR PLEASURE IN DOING THINGS: 0
SUM OF ALL RESPONSES TO PHQ QUESTIONS 1-9: 0
SUM OF ALL RESPONSES TO PHQ9 QUESTIONS 1 & 2: 0
SUM OF ALL RESPONSES TO PHQ QUESTIONS 1-9: 0
2. FEELING DOWN, DEPRESSED OR HOPELESS: 0

## 2023-01-19 NOTE — PROGRESS NOTES
MA Rooming Questions  Patient: Blossom Blizzard  MRN: [id number]    Date: 1/19/2023        1. Do you have any new issues? yes - Pt c/o feeling week         2. Do you need any refills on medications?    no    3. Have you had any imaging done since your last visit?   no    4. Have you been hospitalized or seen in the emergency room since your last visit here?   no    5. Did the patient have a depression screening completed today?  Yes    No data recorded     PHQ-9 Given to (if applicable):               PHQ-9 Score (if applicable):                     [] Positive     [x]  Negative              Does question #9 need addressed (if applicable)                     [] Yes    []  No               Adrianna Sims MA

## 2023-01-19 NOTE — PROGRESS NOTES
Pt reported today for port draw and flush. Has a Mediport on Left side chest and was accessed with a 20 G  1 in  Gripper Plus Non-Coring safety needle. Was accessed on 1st attempt, blood return was noted, labs drawn and sent to lab for processing. Port was flushed with 20 cc's (0.9% Sodium Chloride Injection USP) and locked with 500 units of heparin. Pt tolerated procedure well.

## 2023-01-20 DIAGNOSIS — N95.1 MENOPAUSAL VASOMOTOR SYNDROME: ICD-10-CM

## 2023-01-21 RX ORDER — HYDROXYZINE 50 MG/1
50 TABLET, FILM COATED ORAL 2 TIMES DAILY PRN
Qty: 30 TABLET | Refills: 0 | Status: SHIPPED | OUTPATIENT
Start: 2023-01-21

## 2023-01-21 RX ORDER — ESTRADIOL 0.5 MG/1
0.5 TABLET ORAL DAILY
Qty: 30 TABLET | Refills: 1 | Status: SHIPPED | OUTPATIENT
Start: 2023-01-21

## 2023-01-22 LAB — T3 TOTAL: 103 NG/DL (ref 80–200)

## 2023-01-26 ENCOUNTER — TELEPHONE (OUTPATIENT)
Dept: INTERNAL MEDICINE CLINIC | Age: 53
End: 2023-01-26

## 2023-01-26 NOTE — TELEPHONE ENCOUNTER
Medical records request received from [facility] and all pertinent information faxed to [phone].

## 2023-02-01 ENCOUNTER — OFFICE VISIT (OUTPATIENT)
Dept: ORTHOPEDIC SURGERY | Age: 53
End: 2023-02-01
Payer: COMMERCIAL

## 2023-02-01 VITALS
SYSTOLIC BLOOD PRESSURE: 124 MMHG | HEART RATE: 67 BPM | OXYGEN SATURATION: 96 % | DIASTOLIC BLOOD PRESSURE: 80 MMHG | WEIGHT: 234 LBS | BODY MASS INDEX: 39.95 KG/M2 | HEIGHT: 64 IN

## 2023-02-01 DIAGNOSIS — Z98.890 S/P ARTHROSCOPIC SURGERY OF LEFT KNEE: Primary | ICD-10-CM

## 2023-02-01 PROCEDURE — 99213 OFFICE O/P EST LOW 20 MIN: CPT | Performed by: STUDENT IN AN ORGANIZED HEALTH CARE EDUCATION/TRAINING PROGRAM

## 2023-02-01 PROCEDURE — 1036F TOBACCO NON-USER: CPT | Performed by: STUDENT IN AN ORGANIZED HEALTH CARE EDUCATION/TRAINING PROGRAM

## 2023-02-01 PROCEDURE — G8484 FLU IMMUNIZE NO ADMIN: HCPCS | Performed by: STUDENT IN AN ORGANIZED HEALTH CARE EDUCATION/TRAINING PROGRAM

## 2023-02-01 PROCEDURE — 3074F SYST BP LT 130 MM HG: CPT | Performed by: STUDENT IN AN ORGANIZED HEALTH CARE EDUCATION/TRAINING PROGRAM

## 2023-02-01 PROCEDURE — 3017F COLORECTAL CA SCREEN DOC REV: CPT | Performed by: STUDENT IN AN ORGANIZED HEALTH CARE EDUCATION/TRAINING PROGRAM

## 2023-02-01 PROCEDURE — G8417 CALC BMI ABV UP PARAM F/U: HCPCS | Performed by: STUDENT IN AN ORGANIZED HEALTH CARE EDUCATION/TRAINING PROGRAM

## 2023-02-01 PROCEDURE — G8428 CUR MEDS NOT DOCUMENT: HCPCS | Performed by: STUDENT IN AN ORGANIZED HEALTH CARE EDUCATION/TRAINING PROGRAM

## 2023-02-01 PROCEDURE — 3079F DIAST BP 80-89 MM HG: CPT | Performed by: STUDENT IN AN ORGANIZED HEALTH CARE EDUCATION/TRAINING PROGRAM

## 2023-02-01 ASSESSMENT — ENCOUNTER SYMPTOMS
STRIDOR: 0
FACIAL SWELLING: 0
VOMITING: 0
COUGH: 0
PHOTOPHOBIA: 0
WHEEZING: 0
NAUSEA: 0
EYE REDNESS: 0
BACK PAIN: 0
ABDOMINAL PAIN: 0
COLOR CHANGE: 0
EYE PAIN: 0
SHORTNESS OF BREATH: 0

## 2023-02-01 NOTE — PATIENT INSTRUCTIONS
Continue weight-bearing as tolerated. Continue range of motion exercises as instructed. Ice and elevate as needed. Tylenol or Motrin for pain.   Follow up in 2 months

## 2023-02-01 NOTE — PROGRESS NOTES
Patient is comes in today for a 4 -5 month follow up post left knee arthroscopic irrigation and debridement with drain placement. She was last seen in our office on 11/2/22. She rates her pain at 4/10 today. She reports random swelling in her knee she using ice and medication to relieve the swelling and pain. She denies any new falls or injuries.

## 2023-02-01 NOTE — PROGRESS NOTES
2/1/2023   Chief Complaint   Patient presents with    Follow-up     Left knee arthroscopy        History of Present Illness:                             Siva Seen is a 45 y.o. female     Date of surgery: 9-     Procedure:  1. Diagnostic and operative arthroscopy of Left knee with irrigation and debridement  2. Placement of Left knee drain      History:  Jay Conrad is here in follow up regarding their 4.5 month postop appointment for left knee procedure as discussed above    Patient is doing well. They have 4/10 pain this time but states that it fluctuates in she is having more and longer periods of no pain. They deny chest pain, SOB, calf pain,fever,wound drainage. No other issues. Patient denies any constitutional symptoms. She states her knee pain is overall improved. Patient states they have been compliant with restrictions. Patient has been ambulating without assistive device    Patient continues to get good relief from ice and pain medication. Patient is currently being seen by rheumatologist and states that her rheumatoid numbers have been increased recently    Medical History  Patient's medications, allergies, past medical, surgical, social and family histories were reviewed and updated as appropriate. Past Medical History:   Diagnosis Date    Acid reflux     Anemia     Anxiety     Arthritis     Hands, Back And Ankles    Arthritis     Bleeding ulcer 2014    \"I Had Ulcers In My Stomach And Colon\"/ per pt on 8/12/2019\"they said recently having some blood in my stomach- in July ( 2019)could not find where coming from \"    Bronchitis Last Episode 2014    Chronic back pain     Chronic pain     [facility]    COPD (chronic obstructive pulmonary disease) ([facility] [address]     Sees Dr. Wali Conner    Depression     Disease of blood and blood forming organ     Fibromyalgia Dx 2013    GERD (gastroesophageal reflux disease)     H/O echocardiogram 08/11/2015    EF >55%.  LA to be at the upper limit of normal in size. LV hypertrophy with normal LV systolic, but abnormal diastolic function. Normal valvular structures and function. H/O echocardiogram 08/30/2018    EF 55-60%    Hiatal hernia     History of blood transfusion 09/2015 And 2018    No Reaction To Blood Transfusions Received    History of sleeve gastrectomy     [place] (hard of hearing)     Right Ear    Hx of cardiac catheterization 10/24/2010    Angiographically normal coronary arteries w/ normal LV function and wall motion. Hx of transesophageal echocardiography (PILO) for monitoring 12/02/2010    EF 55-60%. WNL.     HX OTHER MEDICAL     per old chart hx of sepsis and dx left 5th metatarsal MSSA osteomylitis- consult with Dr Pollo Gan     \"very difficulty IV stick- had mediport infection in the past- then put picc line in and removed 8/7/2019 now going to put new mediport in\"( 8/15/2019)    Hypertension     follow with  ? name    Hypertension     Immune deficiency disorder ([address]     Kidney cysts     \"they found that I have a kidney cyst but not sure which side\" per pt on 7/15/2020    Kidney stone     Lupus ([address] Dx 2013    \"Rheumatoid Lupus\"    MDRO (multiple drug resistant organisms) resistance     4 months ago chest from mediport    Morbid obesity ([address]     MRSA bacteremia     Nausea     \"Most Of The Time\"    Osteomyelitis of fifth toe of left foot (HCC)     Pain management     Sees Dr. Steven Mcleod, Once A Month    Pancreatitis chronic Dx 2001    Pneumonia Dx 11-15    Seizures ([address]     Shortness of breath on exertion     Shortness of breath on exertion     Sleep apnea     Has CPAP\"no longer use the cpap since lost weight\"    Staph infection Dx 11-15    Left Foot    Staph infection Dx 11-15    \"Left Foot\"    Teeth missing     Upper And Lower    Thyroid disease     Wears glasses     To Read     Past Surgical History:   Procedure Laterality Date    ABDOMEN SURGERY      APPENDECTOMY  02/1998    Done When Tubes And Ovaries Were Removed    APPENDECTOMY      CARDIAC CATHETERIZATION  10/24/2010    CATHETER REMOVAL N/A 2019    PORT REMOVAL performed by Lisbeth Scott MD at [facility] OR     SECTION  1991    CHOLECYSTECTOMY      CHOLECYSTECTOMY, LAPAROSCOPIC      COLONOSCOPY  Last Done In     DENTAL SURGERY      Teeth Extracted In Past    ENDOSCOPY, COLON, DIAGNOSTIC  Last Done In 2018    FOOT DEBRIDEMENT Left 2019    FIRST METATARSAL DEBRIDEMENT INCISION AND DRAINAGE. EXCISION OF ULCER. RESECTION OF BONE. 1ST METATARSAL POWER LAVAGE AND BONE CEMENT performed by Fly Doran DPM at [facility] OR    FOOT DEBRIDEMENT Left 4/15/2022    LEFT FOOT ABSCESS DEBRIDEMENT INCISION AND DRAINAGE, CYST REMOVAL performed by Fly Doran DPM at [facility] [place]    FOOT SURGERY Left Last Done In      Dr. Lassiter, \" About 6 Surgeries Done Because Of Staph Infection\"    HERNIA REPAIR      HIATAL HERNIA REPAIR N/A 2019    HERNIA HIATAL LAPAROSCOPIC ROBOTIC performed by Lisbeth Scott MD at [facility] OR    HYSTERECTOMY (CERVIX STATUS UNKNOWN)  10/1997    Partial Abdominal Hysterectomy    HYSTERECTOMY (CERVIX STATUS UNKNOWN)      INSERTION / REMOVAL / REPLACEMENT VENOUS ACCESS CATHETER N/A 8/15/2019    PORT INSERTION performed by Lisbeth Scott MD at [facility] OR    KNEE ARTHROSCOPY Left 2022    LEFT KNEE ARTHROSCOPIC IRRIGATION AND DEBRIDEMENT WITH DRAIN PLACEMENT performed by Tavares Sr DO at [facility] OR    LAP BAND  ~2000    removed after 6 months    LEG BIOPSY EXCISION Left 3/8/2021    LEFT THIGH LESION BIOPSY EXCISION performed by Lisbeth Scott MD at [facility] OR    LUNG REMOVAL, PARTIAL Left     Benign    OTHER SURGICAL HISTORY  1998    \"Tubes And Ovaries Removed, Appendectomy Also Done\"    OTHER SURGICAL HISTORY  Last Done 7-15-16    Mediport Insertion \"Total Of Six Done, Removed Last Mediport In \"    PORT SURGERY N/A 1/15/2020    PORT REMOVAL performed by Lisbeth Scott MD at [facility] OR     PORT SURGERY N/A 7/6/2020    PORT INSERTION performed by Lisbeth Scott MD at [facility] OR    PORT SURGERY Left 8/3/2021    MEDIPORT REPLACEMENT performed by Lisbeth Scott MD at [facility] OR    RUFINO-EN-Y GASTRIC BYPASS N/A 8/16/2021    GASTRIC BYPASS RUFINO-EN-Y LAPAROSCOPIC ROBOTIC, LYSIS OF ADHESIONS performed by Lisbeth Scott MD at [facility] OR    SLEEVE GASTRECTOMY N/A 2/12/2019    GASTRECTOMY SLEEVE LAPAROSCOPIC ROBOTIC performed by Lisbeth Scott MD at [facility] OR    TONSILLECTOMY      TONSILLECTOMY AND ADENOIDECTOMY  1975    UPPER GASTROINTESTINAL ENDOSCOPY  08/27/2018    UPPER GASTROINTESTINAL ENDOSCOPY N/A 4/2/2019    EGD CONTROL HEMORRHAGE with epi injection at bleeding site performed by Lisbeth Scott MD at [facility] ENDOSCOPY    UPPER GASTROINTESTINAL ENDOSCOPY N/A 6/19/2019    EGD DIAGNOSTIC ONLY performed by Lisbeth Scott MD at [facility] ENDOSCOPY    UPPER GASTROINTESTINAL ENDOSCOPY N/A 7/22/2019    EGD DIAGNOSTIC ONLY performed by Cortez Hanley MD at [facility] ENDOSCOPY    UPPER GASTROINTESTINAL ENDOSCOPY N/A 10/14/2019    EGD DIAGNOSTIC ONLY performed by Lisbeth Scott MD at [facility] ENDOSCOPY    UPPER GASTROINTESTINAL ENDOSCOPY N/A 7/17/2020    EGJ DILATATION BALLOON WITH 18-20 MM BALLOON, DILATED TO 20 MM. performed by Lisbeth Scott MD at [facility] ENDOSCOPY    UPPER GASTROINTESTINAL ENDOSCOPY N/A 5/28/2021    EGD BIOPSY performed by Lisbeth Scott MD at [facility] ENDOSCOPY    VASCULAR SURGERY       Family History   Problem Relation Age of Onset    Diabetes Mother         \"Borderline Diabetes\"    High Blood Pressure Mother     Obesity Mother     Arthritis Mother     Heart Disease Mother     High Cholesterol Mother     Vision Loss Mother     Diabetes Father     High Blood Pressure Father     Asthma Father     Cancer Father         prostate cancer    High Blood Pressure Brother     Asthma Son     Vision Loss Son     Lupus Daughter     Other Daughter         \"Alot Of Female Problems\"     Social History     Socioeconomic  History    Marital status:    Tobacco Use    Smoking status: Never    Smokeless tobacco: Never   Vaping Use    Vaping Use: Never used   Substance and Sexual Activity    Alcohol use: Never    Drug use: Never    Sexual activity: Not Currently   Social History Narrative    ** Merged History Encounter **          Social Determinants of Health     Financial Resource Strain: Low Risk     Difficulty of Paying Living Expenses: Not hard at all   Food Insecurity: No Food Insecurity    Worried About Running Out of Food in the Last Year: Never true    Ran Out of Food in the Last Year: Never true     Current Outpatient Medications   Medication Sig Dispense Refill    estradiol (ESTRACE) 0.5 MG tablet Take 1 tablet by mouth daily 30 tablet 1    hydrOXYzine HCl (ATARAX) 50 MG tablet Take 1 tablet by mouth 2 times daily as needed for Itching 30 tablet 0    scopolamine (TRANSDERM-SCOP) transdermal patch APPLY 1 PATCH TOPICALLY EVERY 72 HOURS AS NEEDED FOR NAUSEA FOR VOMITING 10 patch 0    ondansetron (ZOFRAN-ODT) 4 MG disintegrating tablet Take 1 tablet by mouth 3 times daily as needed for Nausea or Vomiting 30 tablet 1    gabapentin (NEURONTIN) 800 MG tablet Take 1 tablet by mouth 3 times daily for 4 days.  12 tablet 0    levETIRAcetam (KEPPRA) 1000 MG tablet Take 1 tablet by mouth twice daily 60 tablet 3    levothyroxine (SYNTHROID) 125 MCG tablet Take 1 tablet by mouth Daily 30 tablet 2    pantoprazole (PROTONIX) 40 MG tablet Take 1 tablet by mouth twice daily 180 tablet 0    PARoxetine (PAXIL) 30 MG tablet TAKE 1 & 1/2 (ONE & ONE-HALF) TABLETS BY MOUTH NIGHTLY 135 tablet 0    ferrous sulfate (IRON 325) 325 (65 Fe) MG tablet Take 1 tablet by mouth 2 times daily 180 tablet 1    albuterol sulfate HFA (PROVENTIL;VENTOLIN;PROAIR) 108 (90 Base) MCG/ACT inhaler Inhale 2 puffs into the lungs 4 times daily 1 each 3    albuterol (PROVENTIL) (2.5 MG/3ML) 0.083% nebulizer solution Take 3 mLs by nebulization every 6 hours as needed for Wheezing 120 each 0    NARCAN 4 MG/0.1ML LIQD nasal spray USE AS DIRECTED AS NEEDED FOR SUSPECTED OPIOID OVERDOSE      Melatonin 10 MG TABS Take 10-20 mg by mouth nightly as needed      Cyanocobalamin (VITAMIN B 12 PO) Take 1 tablet by mouth daily      betamethasone valerate (VALISONE) 0.1 % ointment APPLY OINTMENT TO AFFECTED AREA TWICE DAILY TO HANDS AND ELBOWS FOR 21 DAYS      tiZANidine (ZANAFLEX) 4 MG tablet Take 4 mg by mouth every 8 hours as needed       Cholecalciferol (VITAMIN D3) 5000 units TABS Take 1 tablet by mouth daily       No current facility-administered medications for this visit. Allergies   Allergen Reactions    Aspirin Palpitations     \"My Heart Rate Elevates\"    Compazine [Prochlorperazine] Rash    Shellfish-Derived Products Swelling    Toradol [Ketorolac Tromethamine] Rash    Haldol [Haloperidol] Other (See Comments)     States \"shook severely and had to have Benadryl\"    Asa [Aspirin]     Compazine [Prochlorperazine]     Haldol [Haloperidol]      Shaking      Reglan [Metoclopramide] Itching    Reglan [Metoclopramide]     Toradol [Ketorolac Tromethamine]          Review of Systems   Constitutional:  Negative for activity change, appetite change, chills, diaphoresis, fatigue, fever and unexpected weight change. HENT:  Negative for congestion, ear pain, facial swelling, hearing loss and sneezing. Eyes:  Negative for photophobia, pain and redness. Respiratory:  Negative for cough, shortness of breath, wheezing and stridor. Cardiovascular:  Negative for chest pain, palpitations and leg swelling. Gastrointestinal:  Negative for abdominal pain, nausea and vomiting. Endocrine: Negative for cold intolerance and heat intolerance. Musculoskeletal:  Positive for arthralgias. Negative for back pain, gait problem, joint swelling, myalgias, neck pain and neck stiffness. Skin:  Negative for color change, pallor, rash and wound.    Neurological:  Negative for dizziness, facial asymmetry, weakness and numbness. Examination:  General Exam:  Vitals: /80   Pulse 67   Ht 5' 4\" (1.626 m)   Wt 234 lb (106.1 kg)   SpO2 96%   BMI 40.17 kg/m²    Physical Exam  Constitutional:       General: She is not in acute distress. Appearance: Normal appearance. She is obese. She is not ill-appearing. HENT:      Head: Normocephalic and atraumatic. Eyes:      General:         Right eye: No discharge. Left eye: No discharge. Extraocular Movements: Extraocular movements intact. Cardiovascular:      Pulses: Normal pulses. Pulmonary:      Effort: Pulmonary effort is normal.      Breath sounds: Normal breath sounds. Musculoskeletal:         General: Tenderness present. No swelling, deformity or signs of injury. Cervical back: Normal range of motion. Right hip: Normal.      Left hip: Normal.      Right upper leg: Normal.      Left upper leg: Normal.      Left knee: Bony tenderness and crepitus present. No swelling, deformity, effusion, erythema, ecchymosis or lacerations. Normal range of motion. Tenderness present over the medial joint line. No lateral joint line tenderness. No LCL laxity, MCL laxity, ACL laxity or PCL laxity. Normal alignment and normal patellar mobility. Normal pulse. Instability Tests: Anterior drawer test negative. Posterior drawer test negative. Anterior Lachman test negative. Right lower leg: Normal. No edema. Left lower leg: Normal. No edema. Right ankle: Normal.      Left ankle: Normal.      Right foot: Normal.      Left foot: Normal.   Skin:     General: Skin is warm and dry. Capillary Refill: Capillary refill takes less than 2 seconds. Neurological:      General: No focal deficit present. Mental Status: She is alert and oriented to person, place, and time. Mental status is at baseline.       Gait: Gait normal.   Psychiatric:         Mood and Affect: Mood normal. Behavior: Behavior normal.        LEFT KNEE EXAMINATION       OBSERVATION / INSPECTION     Gait: Mildly antalgic     Alignment: Neutral     Scars: Well-healed previous arthroscopic incisions    Muscle atrophy: Minimal quad    Effusion: None     Warmth: None     Discoloration: none       TENDERNESS / CREPITUS (T / C): Patella - / -     Lateral joint line - / -  Medial joint line  + / +     Peripatellar lateral - / -  Peripatellar medial  - / -     Medial plica - / -  Lateral plica - / -    Patellar tendon - / -   Prepatellar Bursa - / -     Popliteal fossa - / -    Gastrocnemius - / -    Quadricep - / -   Quad tendon - / -      Tibial tubercle - / -     Thigh/hamstring - / -  Pes anserine/HS - / -     ITB - / -     Tibia - / -   Fibula - / -    Tib/fib joint - / -     MFC - / -    LFC - / -     MCL: Proximal - / -  Distal - / -    LCL - / -    MCL - / -      ROM: (* = pain)  PASSIVE  ACTIVE     Left :    0 / 115  0 / 105      Right :    0 / 125  0 / 125      PATELLOFEMORAL EXAMINATION:    See above noted areas of tenderness. Patella position     Subluxation / dislocation: Centered     Sup. / Inf; Normal     Crepitus (PF): Absent     Patellar Mobility:     Medial-lateral: Normal     Superior-inferior: Normal     Inferior tilt Normal     Patellar tendon: Normal     Lateral tilt: Normal     J-sign: None     Patellofemoral grind: Mild pain         MENISCAL SIGNS:     Pain on terminal extension: -    Pain on terminal flexion: -    Squat: Not tested      LIGAMENT EXAMINATION:    ACL / Lachman: normal (-1 to 2mm)      PCL-Post.  drawer: normal 0 to 2mm    MCL- Valgus: normal 0 to 2mm    LCL- Varus:  normal 0 to 2mm        STRENGTH: (* = with pain) PAINFUL SIDE    Quadricep 5/5    Hamstrin/5      EXTREMITY NEURO-VASCULAR EXAMINATION:     Sensation:  Grossly intact to light touch all dermatomal regions.      Motor Function:  Fully intact motor function at hip, knee, foot and ankle      DTRs;  quadriceps and achilles 2+. No clonus and downgoing Babinski. Vascular status:  DP and PT pulses 2+, brisk capillary refill, symmetric. Patient demonstrates appropriate mood and affect. Left lower extremity exam:   The incisions are well-healed clean, dry, intact, and nontender with no erythema. There is no drainage. They have no edema, the Arm compartments are soft . There are No cords or calf tenderness. No significant calf/ankle edema. They are neurovascularly intact distally. Range of motion of the right knee demonstrates near full painless range of motion without complaint    No areas of tenderness to palpation    Negative Petar's      Diagnostic testing:  No new orthopedic imaging      Office Procedures:  No orders of the defined types were placed in this encounter. Assessment and Plan    A: Left knee osteoarthritis    P:   I once again had a thorough conversation with the patient regarding her treatment course. I explained that there were no concerning findings on physical exam today in regards to the knee. I explained that once she gets her treatment adjusted for her lupus and rheumatoid related issues I feel that it would be appropriate to discuss a total knee replacement although she would likely have to have some sort of weight loss before an orthopedic surgeon will undertake her total knee replacement and she voices her understanding. Patient will follow-up in 2 months for reevaluation of the knee. We can consider cortisone injection versus gel injection in the future for the knee. I would likely consider gels in the near future due to the concern for previous infection and not wanting to cause any type of flareup with a cortisone injection and patient voices her understanding.  -continue the PT protocol   -Patient is weight-bear as tolerated, range of motion as tolerated.   No restrictions  -rest/elevation as needed  -DVT prophylaxis: None   -No refills needed  -f/u in 8 week(s)  -f/u sooner prn any issues       Electronically signed by Panda Saunders DO on 2/1/2023 at 2:59 PM

## 2023-02-02 ENCOUNTER — CLINICAL DOCUMENTATION (OUTPATIENT)
Dept: ONCOLOGY | Age: 53
End: 2023-02-02

## 2023-02-02 ENCOUNTER — TELEPHONE (OUTPATIENT)
Dept: ONCOLOGY | Age: 53
End: 2023-02-02

## 2023-02-02 DIAGNOSIS — K21.9 GASTROESOPHAGEAL REFLUX DISEASE WITHOUT ESOPHAGITIS: ICD-10-CM

## 2023-02-02 RX ORDER — PANTOPRAZOLE SODIUM 40 MG/1
TABLET, DELAYED RELEASE ORAL
Qty: 180 TABLET | Refills: 0 | Status: SHIPPED | OUTPATIENT
Start: 2023-02-02

## 2023-02-02 NOTE — TELEPHONE ENCOUNTER
Returned patient's call @ [phone] to review lab results in detail from 01/19/223. No immediate concerns at this time. Patient voices understanding. Denies further needs.

## 2023-02-11 ENCOUNTER — APPOINTMENT (OUTPATIENT)
Dept: CT IMAGING | Age: 53
End: 2023-02-11
Payer: COMMERCIAL

## 2023-02-11 ENCOUNTER — HOSPITAL ENCOUNTER (EMERGENCY)
Age: 53
Discharge: HOME | End: 2023-02-11
Attending: EMERGENCY MEDICINE
Payer: COMMERCIAL

## 2023-02-11 VITALS
OXYGEN SATURATION: 99 % | TEMPERATURE: 98.3 F | HEART RATE: 59 BPM | RESPIRATION RATE: 16 BRPM | BODY MASS INDEX: 40.12 KG/M2 | DIASTOLIC BLOOD PRESSURE: 116 MMHG | HEIGHT: 64 IN | SYSTOLIC BLOOD PRESSURE: 127 MMHG | WEIGHT: 235 LBS

## 2023-02-11 DIAGNOSIS — R10.9 ABDOMINAL PAIN, UNSPECIFIED ABDOMINAL LOCATION: ICD-10-CM

## 2023-02-11 DIAGNOSIS — K29.00 ACUTE GASTRITIS, PRESENCE OF BLEEDING UNSPECIFIED, UNSPECIFIED GASTRITIS TYPE: ICD-10-CM

## 2023-02-11 DIAGNOSIS — M25.562 CHRONIC PAIN OF LEFT KNEE: ICD-10-CM

## 2023-02-11 DIAGNOSIS — R11.2 NAUSEA AND VOMITING, UNSPECIFIED VOMITING TYPE: Primary | ICD-10-CM

## 2023-02-11 DIAGNOSIS — G89.29 CHRONIC PAIN OF LEFT KNEE: ICD-10-CM

## 2023-02-11 DIAGNOSIS — Z98.84 HISTORY OF ROUX-EN-Y GASTRIC BYPASS: ICD-10-CM

## 2023-02-11 LAB
ALBUMIN SERPL-MCNC: 3.6 GM/DL (ref 3.4–5)
ALP BLD-CCNC: 116 IU/L (ref 40–129)
ALT SERPL-CCNC: 10 U/L (ref 10–40)
ANION GAP SERPL CALCULATED.3IONS-SCNC: 8 MMOL/L (ref 4–16)
AST SERPL-CCNC: 12 IU/L (ref 15–37)
BASOPHILS ABSOLUTE: 0 K/CU MM
BASOPHILS RELATIVE PERCENT: 0.8 % (ref 0–1)
BILIRUB SERPL-MCNC: 0.1 MG/DL (ref 0–1)
BILIRUBIN DIRECT: 0.2 MG/DL (ref 0–0.3)
BILIRUBIN, INDIRECT: ABNORMAL MG/DL (ref 0–0.7)
BUN SERPL-MCNC: 12 MG/DL (ref 6–23)
CALCIUM SERPL-MCNC: 9.6 MG/DL (ref 8.3–10.6)
CHLORIDE BLD-SCNC: 106 MMOL/L (ref 99–110)
CO2: 24 MMOL/L (ref 21–32)
CREAT SERPL-MCNC: 0.6 MG/DL (ref 0.6–1.1)
DIFFERENTIAL TYPE: ABNORMAL
EOSINOPHILS ABSOLUTE: 0.2 K/CU MM
EOSINOPHILS RELATIVE PERCENT: 4.8 % (ref 0–3)
GFR SERPL CREATININE-BSD FRML MDRD: >60 ML/MIN/1.73M2
GLUCOSE SERPL-MCNC: 98 MG/DL (ref 70–99)
HCT VFR BLD CALC: 29.9 % (ref 37–47)
HEMOGLOBIN: 9.3 GM/DL (ref 12.5–16)
IMMATURE NEUTROPHIL %: 0 % (ref 0–0.43)
LIPASE: 13 IU/L (ref 13–60)
LYMPHOCYTES ABSOLUTE: 1.9 K/CU MM
LYMPHOCYTES RELATIVE PERCENT: 39.2 % (ref 24–44)
MCH RBC QN AUTO: 25.7 PG (ref 27–31)
MCHC RBC AUTO-ENTMCNC: 31.1 % (ref 32–36)
MCV RBC AUTO: 82.6 FL (ref 78–100)
MONOCYTES ABSOLUTE: 0.4 K/CU MM
MONOCYTES RELATIVE PERCENT: 8 % (ref 0–4)
NUCLEATED RBC %: 0 %
PDW BLD-RTO: 14.6 % (ref 11.7–14.9)
PLATELET # BLD: 220 K/CU MM (ref 140–440)
PMV BLD AUTO: 9.8 FL (ref 7.5–11.1)
POTASSIUM SERPL-SCNC: 4.5 MMOL/L (ref 3.5–5.1)
RBC # BLD: 3.62 M/CU MM (ref 4.2–5.4)
SEGMENTED NEUTROPHILS ABSOLUTE COUNT: 2.3 K/CU MM
SEGMENTED NEUTROPHILS RELATIVE PERCENT: 47.2 % (ref 36–66)
SODIUM BLD-SCNC: 138 MMOL/L (ref 135–145)
TOTAL IMMATURE NEUTOROPHIL: 0 K/CU MM
TOTAL NUCLEATED RBC: 0 K/CU MM
TOTAL PROTEIN: 6.3 GM/DL (ref 6.4–8.2)
WBC # BLD: 4.8 K/CU MM (ref 4–10.5)

## 2023-02-11 PROCEDURE — 85025 COMPLETE CBC W/AUTO DIFF WBC: CPT

## 2023-02-11 PROCEDURE — 74177 CT ABD & PELVIS W/CONTRAST: CPT

## 2023-02-11 PROCEDURE — C9113 INJ PANTOPRAZOLE SODIUM, VIA: HCPCS | Performed by: EMERGENCY MEDICINE

## 2023-02-11 PROCEDURE — 80053 COMPREHEN METABOLIC PANEL: CPT

## 2023-02-11 PROCEDURE — 6360000004 HC RX CONTRAST MEDICATION: Performed by: EMERGENCY MEDICINE

## 2023-02-11 PROCEDURE — 96372 THER/PROPH/DIAG INJ SC/IM: CPT

## 2023-02-11 PROCEDURE — 96376 TX/PRO/DX INJ SAME DRUG ADON: CPT

## 2023-02-11 PROCEDURE — 6360000002 HC RX W HCPCS: Performed by: EMERGENCY MEDICINE

## 2023-02-11 PROCEDURE — 82248 BILIRUBIN DIRECT: CPT

## 2023-02-11 PROCEDURE — 99285 EMERGENCY DEPT VISIT HI MDM: CPT

## 2023-02-11 PROCEDURE — 83690 ASSAY OF LIPASE: CPT

## 2023-02-11 PROCEDURE — 96374 THER/PROPH/DIAG INJ IV PUSH: CPT

## 2023-02-11 PROCEDURE — 96375 TX/PRO/DX INJ NEW DRUG ADDON: CPT

## 2023-02-11 RX ORDER — PROMETHAZINE HYDROCHLORIDE 25 MG/1
25 TABLET ORAL ONCE
Status: DISCONTINUED | OUTPATIENT
Start: 2023-02-11 | End: 2023-02-11 | Stop reason: HOSPADM

## 2023-02-11 RX ORDER — DICYCLOMINE HCL 20 MG
20 TABLET ORAL 4 TIMES DAILY PRN
Qty: 20 TABLET | Refills: 0 | Status: SHIPPED | OUTPATIENT
Start: 2023-02-11

## 2023-02-11 RX ORDER — SODIUM CHLORIDE 0.9 % (FLUSH) 0.9 %
5-40 SYRINGE (ML) INJECTION 2 TIMES DAILY
Status: DISCONTINUED | OUTPATIENT
Start: 2023-02-11 | End: 2023-02-11 | Stop reason: HOSPADM

## 2023-02-11 RX ORDER — PANTOPRAZOLE SODIUM 40 MG/10ML
80 INJECTION, POWDER, LYOPHILIZED, FOR SOLUTION INTRAVENOUS ONCE
Status: COMPLETED | OUTPATIENT
Start: 2023-02-11 | End: 2023-02-11

## 2023-02-11 RX ORDER — PROMETHAZINE HYDROCHLORIDE 25 MG/ML
25 INJECTION, SOLUTION INTRAMUSCULAR; INTRAVENOUS ONCE
Status: COMPLETED | OUTPATIENT
Start: 2023-02-11 | End: 2023-02-11

## 2023-02-11 RX ORDER — HEPARIN SODIUM (PORCINE) LOCK FLUSH IV SOLN 100 UNIT/ML 100 UNIT/ML
500 SOLUTION INTRAVENOUS ONCE
Status: COMPLETED | OUTPATIENT
Start: 2023-02-11 | End: 2023-02-11

## 2023-02-11 RX ORDER — MORPHINE SULFATE 2 MG/ML
2 INJECTION, SOLUTION INTRAMUSCULAR; INTRAVENOUS ONCE
Status: COMPLETED | OUTPATIENT
Start: 2023-02-11 | End: 2023-02-11

## 2023-02-11 RX ORDER — PROMETHAZINE HYDROCHLORIDE 25 MG/1
25 TABLET ORAL 4 TIMES DAILY PRN
Qty: 20 TABLET | Refills: 0 | Status: SHIPPED | OUTPATIENT
Start: 2023-02-11 | End: 2023-02-16 | Stop reason: SDUPTHER

## 2023-02-11 RX ORDER — HEPARIN SODIUM (PORCINE) LOCK FLUSH IV SOLN 100 UNIT/ML 100 UNIT/ML
100 SOLUTION INTRAVENOUS ONCE
Status: DISCONTINUED | OUTPATIENT
Start: 2023-02-11 | End: 2023-02-11

## 2023-02-11 RX ORDER — MORPHINE SULFATE 4 MG/ML
4 INJECTION, SOLUTION INTRAMUSCULAR; INTRAVENOUS ONCE
Status: COMPLETED | OUTPATIENT
Start: 2023-02-11 | End: 2023-02-11

## 2023-02-11 RX ORDER — SUCRALFATE ORAL 1 G/10ML
1 SUSPENSION ORAL 4 TIMES DAILY
Qty: 840 ML | Refills: 0 | Status: SHIPPED | OUTPATIENT
Start: 2023-02-11 | End: 2023-03-04

## 2023-02-11 RX ORDER — ONDANSETRON 2 MG/ML
8 INJECTION INTRAMUSCULAR; INTRAVENOUS ONCE
Status: COMPLETED | OUTPATIENT
Start: 2023-02-11 | End: 2023-02-11

## 2023-02-11 RX ADMIN — MORPHINE SULFATE 4 MG: 4 INJECTION, SOLUTION INTRAMUSCULAR; INTRAVENOUS at 04:54

## 2023-02-11 RX ADMIN — PANTOPRAZOLE SODIUM 80 MG: 40 INJECTION, POWDER, FOR SOLUTION INTRAVENOUS at 01:35

## 2023-02-11 RX ADMIN — IOPAMIDOL 75 ML: 755 INJECTION, SOLUTION INTRAVENOUS at 02:41

## 2023-02-11 RX ADMIN — Medication 500 UNITS: at 05:57

## 2023-02-11 RX ADMIN — PROMETHAZINE HYDROCHLORIDE 25 MG: 25 INJECTION INTRAMUSCULAR; INTRAVENOUS at 01:35

## 2023-02-11 RX ADMIN — MORPHINE SULFATE 2 MG: 2 INJECTION, SOLUTION INTRAMUSCULAR; INTRAVENOUS at 01:35

## 2023-02-11 RX ADMIN — ONDANSETRON 8 MG: 2 INJECTION INTRAMUSCULAR; INTRAVENOUS at 04:53

## 2023-02-11 ASSESSMENT — PAIN DESCRIPTION - ORIENTATION: ORIENTATION: LEFT

## 2023-02-11 ASSESSMENT — PAIN - FUNCTIONAL ASSESSMENT
PAIN_FUNCTIONAL_ASSESSMENT: 0-10
PAIN_FUNCTIONAL_ASSESSMENT: 0-10

## 2023-02-11 ASSESSMENT — PAIN DESCRIPTION - LOCATION
LOCATION: ABDOMEN
LOCATION: ABDOMEN;LEG

## 2023-02-11 ASSESSMENT — PAIN SCALES - GENERAL
PAINLEVEL_OUTOF10: 6
PAINLEVEL_OUTOF10: 7
PAINLEVEL_OUTOF10: 7

## 2023-02-11 NOTE — ED NOTES
Patient is in bed, semi fowlers position, states she is in Soosalu lot of pain and has vomited twice\".  Provider will be notified about pain medication request.      Quinton Ron RN  02/11/23 0011

## 2023-02-11 NOTE — ED NOTES
Note in the side of epic stating pt was in need of additional pain medications     Dionna Vega RN  02/11/23 [id number]

## 2023-02-11 NOTE — ED PROVIDER NOTES
CHIEF COMPLAINT    Chief Complaint   Patient presents with    Abdominal Pain     History of pancreatitis    Nausea    Emesis    Hand Pain     Left sided; States that her hand is swollen and in pain    Knee Pain     Left sided     HPI  Maria Luisa Armas is a 45 y.o. female with history of anxiety, depression, lupus?,  Fibromyalgia, chronic abdominal pain, Jet-en-Y gastric bypass, arthritis who presents to the ED with multiple complaints with primary complaint being abdominal pain with nausea, and vomiting. She also has complaints of a lesion to second digit of left hand and some swelling and pain to left knee. With respect to the patient's abdominal pain, she has history of Jet-en-Y gastric bypass surgery with chronic abdominal pain. She states over the last 3 to 4 days she has noticed increased pain in the upper abdomen with radiation to the back and associated nausea and vomiting. She has attempted to use home Percocet and Zofran without relief. Symptoms are constant and pain is rated as severe. She has been having normal bowel movements during this time. With respect to the hand complaint the patient has noticed an area of swelling along dorsal aspect of the index finger of left hand for several months. She made her orthopedic surgeon, Dr. Paty Bautista aware of this who said to keep an eye on the area to see if there had any increased swelling. Patient feels over the last 2 to 3 days she has noticed increased swelling to the area and some pain although the pain has been present chronically for months and is rated as mild in severity exacerbated with palpation. Patient states she also has noticed some increased swelling of the last 2 days to her left knee. She has a history of possible septic arthritis to this knee that she underwent arthroscopy which ultimately was culture negative although orthopedic surgery felt this may have been secondary to antibiotics patient received prior to arthroscopy.   She did have a second joint effusion episode in December 2022 to the left knee which was found to be hemarthrosis and without evidence of infectious pathology. There were thoughts this is related to rheumatological disorder. Patient does have history of lupus but does not follow with rheumatology currently and is not on any immune modulating medications. She denies fevers, chills, chest pain, dizziness, lightheadedness      REVIEW OF SYSTEMS  Constitutional: No fever, chills   Eye: No visual changes  HENT: No earache or sore throat. Resp: No SOB or productive cough. Cardio: No chest pain or palpitations. GI: Complains of abdominal pain, nausea, vomiting. No diarrhea or constipation  : No dysuria, urgency or frequency. Endocrine: No heat intolerance, no cold intolerance, no polydipsia   Lymphatics: No adenopathy  Musculoskeletal: Complains of lesion to left index finger and some pain of the left knee with swelling  Neuro: No headaches. Psych: No homicidal or suicidal thoughts  Skin: No rash, No itching. ?  ? PAST MEDICAL HISTORY  Past Medical History:   Diagnosis Date    Acid reflux     Anemia     Anxiety     Arthritis     Hands, Back And Ankles    Arthritis     Bleeding ulcer 2014    \"I Had Ulcers In My Stomach And Colon\"/ per pt on 8/12/2019\"they said recently having some blood in my stomach- in July ( 2019)could not find where coming from \"    Bronchitis Last Episode 2014    Chronic back pain     Chronic pain     [facility]    [facility]     Sees Dr. Jennifer Alvarez    Depression     Disease of blood and blood forming organ     Fibromyalgia Dx 2013    GERD (gastroesophageal reflux disease)     H/O echocardiogram 08/11/2015    EF >55%. LA to be at the upper limit of normal in size. LV hypertrophy with normal LV systolic, but abnormal diastolic function. Normal valvular structures and function.      H/O echocardiogram 08/30/2018    EF 55-60% Hiatal hernia     History of blood transfusion 09/2015 And 2018    No Reaction To Blood Transfusions Received    History of sleeve gastrectomy     [place] (hard of hearing)     Right Ear    Hx of cardiac catheterization 10/24/2010    Angiographically normal coronary arteries w/ normal LV function and wall motion. Hx of transesophageal echocardiography (PILO) for monitoring 12/02/2010    EF 55-60%. WNL.     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 name    Hypertension     Immune deficiency disorder ([address]     Kidney cysts     \"they found that I have a kidney cyst but not sure which side\" per pt on 7/15/2020    Kidney stone     Lupus ([address] Dx 2013    \"Rheumatoid Lupus\"    MDRO (multiple drug resistant organisms) resistance     4 months ago chest from mediport    Morbid obesity ([address]     MRSA bacteremia     Nausea     \"Most Of The Time\"    Osteomyelitis of fifth toe of left foot (HCC)     Pain management     Sees Dr. Brett Soares, Once A Month    Pancreatitis chronic Dx 2001    Pneumonia Dx 11-15    Seizures (HCC)     Shortness of breath on exertion     Shortness of breath on exertion     Sleep apnea     Has CPAP\"no longer use the cpap since lost weight\"    Staph infection Dx 11-15    Left Foot    Staph infection Dx 11-15    \"Left Foot\"    Teeth missing     Upper And Lower    Thyroid disease     Wears glasses     To Read     FAMILY HISTORY  Family History   Problem Relation Age of Onset    Diabetes Mother         \"Borderline Diabetes\"    High Blood Pressure Mother     Obesity Mother     Arthritis Mother     Heart Disease Mother     High Cholesterol Mother     Vision Loss Mother     Diabetes Father     High Blood Pressure Father     Asthma Father     Cancer Father         prostate cancer    High Blood Pressure Brother     Asthma Son     Vision Loss Son     Lupus Daughter     Other Daughter         \"Alot Of Female Problems\"     SOCIAL HISTORY  Social History     Socioeconomic History    Marital status:      Spouse name: None    Number of children: None    Years of education: None    Highest education level: None   Tobacco Use    Smoking status: Never    Smokeless tobacco: Never   Vaping Use    Vaping Use: Never used   Substance and Sexual Activity    Alcohol use: Never    Drug use: Never    Sexual activity: Not Currently   Social History Narrative    ** Merged History Encounter **            SURGICAL HISTORY  Past Surgical History:   Procedure Laterality Date    ABDOMEN SURGERY      APPENDECTOMY  02/1998    Done When Tubes And Ovaries Were Removed    APPENDECTOMY      CARDIAC CATHETERIZATION  10/24/2010    CATHETER REMOVAL N/A 7/16/2019    PORT REMOVAL performed by Charlotte Damon MD at [facility]  08/27/1991    CHOLECYSTECTOMY      CHOLECYSTECTOMY, LAPAROSCOPIC  1990's    COLONOSCOPY  Last Done In 2000's    DENTAL SURGERY      Teeth Extracted In Past    ENDOSCOPY, COLON, DIAGNOSTIC  Last Done In 2018    FOOT DEBRIDEMENT Left 6/16/2019    FIRST METATARSAL DEBRIDEMENT INCISION AND DRAINAGE. EXCISION OF ULCER.  RESECTION OF BONE. 1ST METATARSAL POWER LAVAGE AND BONE CEMENT performed by Mona Kinney DPM at [address] Left 4/15/2022    LEFT FOOT ABSCESS DEBRIDEMENT INCISION AND DRAINAGE, CYST REMOVAL performed by Mona Kinney DPM at [facility] Left Last Done In 2016     Dr. Alissa Mccarthy, \" About 6 Surgeries Done Because Of Staph Infection\"    HERNIA REPAIR      HIATAL HERNIA REPAIR N/A 2/12/2019    HERNIA HIATAL LAPAROSCOPIC ROBOTIC performed by Charlotte Damon MD at [facility] ([facility]  10/1997    Partial Abdominal Hysterectomy    HYSTERECTOMY (CERVIX STATUS UNKNOWN)      INSERTION / REMOVAL / REPLACEMENT VENOUS ACCESS CATHETER N/A 8/15/2019    PORT INSERTION performed by Juliet Candelaria MD at [place] [address]. ARTHROSCOPY Left 9/20/2022    LEFT KNEE ARTHROSCOPIC IRRIGATION AND DEBRIDEMENT WITH DRAIN PLACEMENT performed by Fernando Sosa DO at [address]    removed after 6 months    LEG BIOPSY EXCISION Left 3/8/2021    LEFT THIGH LESION BIOPSY EXCISION performed by Juliet Candelaria MD at [address] [place] [address], PARTIAL Left 2008    Benign    OTHER SURGICAL HISTORY  02/1998    \"Tubes And Ovaries Removed, Appendectomy Also Done\"    OTHER SURGICAL HISTORY  Last Done 7-15-16    Mediport Insertion \"Total Of Six Done, Removed Last Mediport In 2014\"    PORT SURGERY N/A 1/15/2020    PORT REMOVAL performed by Juliet Candelaria MD at [address] N/A 7/6/2020    PORT INSERTION performed by Juliet Candelaria MD at [address] Left 8/3/2021    MEDIPORT REPLACEMENT performed by Juliet Candelaria MD at [facility] N/A 8/16/2021    GASTRIC BYPASS RUFINO-EN-Y LAPAROSCOPIC ROBOTIC, LYSIS OF ADHESIONS performed by Juliet Candelaria MD at [facility] N/A 2/12/2019    GASTRECTOMY SLEEVE LAPAROSCOPIC ROBOTIC performed by Juliet Candelaria MD at [address] [place] [address]  08/27/2018    UPPER GASTROINTESTINAL ENDOSCOPY N/A 4/2/2019    EGD CONTROL HEMORRHAGE with epi injection at bleeding site performed by Juliet Candelaria MD at [address] [facility] 6/19/2019    EGD DIAGNOSTIC ONLY performed by Juliet Candelaria MD at [address] [facility] N/A 7/22/2019    EGD DIAGNOSTIC ONLY performed by Kayleigh Melvin MD at [address] [facility] 10/14/2019    EGD DIAGNOSTIC ONLY performed by Juliet Candelaria MD at [address] [place] [address] ENDOSCOPY N/A 7/17/2020    EGJ DILATATION BALLOON WITH 18-20 MM BALLOON, DILATED TO 20 MM. performed by Renee Lanza MD at [address] [facility] 5/28/2021    EGD BIOPSY performed by Renee Lanza MD at [address] [place] [address]  Discharge Medication List as of 2/11/2023  5:47 AM        CONTINUE these medications which have NOT CHANGED    Details   pantoprazole (PROTONIX) 40 MG tablet Take 1 tablet by mouth twice daily, Disp-180 tablet, R-0Normal      estradiol (ESTRACE) 0.5 MG tablet Take 1 tablet by mouth daily, Disp-30 tablet, R-1Normal      hydrOXYzine HCl (ATARAX) 50 MG tablet Take 1 tablet by mouth 2 times daily as needed for Itching, Disp-30 tablet, R-0Normal      scopolamine (TRANSDERM-SCOP) transdermal patch APPLY 1 PATCH TOPICALLY EVERY 72 HOURS AS NEEDED FOR NAUSEA FOR VOMITING, Disp-10 patch, R-0Normal      ondansetron (ZOFRAN-ODT) 4 MG disintegrating tablet Take 1 tablet by mouth 3 times daily as needed for Nausea or Vomiting, Disp-30 tablet, R-1Normal      gabapentin (NEURONTIN) 800 MG tablet Take 1 tablet by mouth 3 times daily for 4 days. , Disp-12 tablet, R-0Normal      levETIRAcetam (KEPPRA) 1000 MG tablet Take 1 tablet by mouth twice daily, Disp-60 tablet, R-3Normal      levothyroxine (SYNTHROID) 125 MCG tablet Take 1 tablet by mouth Daily, Disp-30 tablet, R-2Normal      PARoxetine (PAXIL) 30 MG tablet TAKE 1 & 1/2 (ONE & ONE-HALF) TABLETS BY MOUTH NIGHTLY, Disp-135 tablet, R-0Normal      ferrous sulfate (IRON 325) 325 (65 Fe) MG tablet Take 1 tablet by mouth 2 times daily, Disp-180 tablet, R-1Normal      albuterol sulfate HFA (PROVENTIL;VENTOLIN;PROAIR) 108 (90 Base) MCG/ACT inhaler Inhale 2 puffs into the lungs 4 times daily, Disp-1 each, R-3Normal      albuterol (PROVENTIL) (2.5 MG/3ML) 0.083% nebulizer solution Take 3 mLs by nebulization every 6 hours as needed for Wheezing, Disp-120 each, R-0Normal      NARCAN 4 MG/0.1ML LIQD nasal spray USE AS DIRECTED AS NEEDED FOR SUSPECTED OPIOID OVERDOSE, DAWHistorical Med      Melatonin 10 MG TABS Take 10-20 mg by mouth nightly as neededHistorical Med      Cyanocobalamin (VITAMIN B 12 PO) Take 1 tablet by mouth dailyHistorical Med      betamethasone valerate (VALISONE) 0.1 % ointment APPLY OINTMENT TO AFFECTED AREA TWICE DAILY TO HANDS AND ELBOWS FOR 21 DAYS, Historical Med      tiZANidine (ZANAFLEX) 4 MG tablet Take 4 mg by mouth every 8 hours as needed Historical Med      Cholecalciferol (VITAMIN D3) 5000 units TABS Take 1 tablet by mouth dailyHistorical Med           ALLERGIES  Allergies   Allergen Reactions    Aspirin Palpitations     \"My Heart Rate Elevates\"    Compazine [Prochlorperazine] Rash    Shellfish-Derived Products Swelling    Toradol [Ketorolac Tromethamine] Rash    Haldol [Haloperidol] Other (See Comments)     States \"shook severely and had to have Benadryl\"    Asa [Aspirin]     Compazine [Prochlorperazine]     Haldol [Haloperidol]      Shaking      Reglan [Metoclopramide] Itching    Reglan [Metoclopramide]     Toradol [Ketorolac Tromethamine]        Nursing notes reviewed by myself for past medical history, family history, social history, surgical history, current medications, and allergies. PHYSICAL EXAM  VITAL SIGNS: Triage VS:    ED Triage Vitals   Enc Vitals Group      BP       Pulse       Resp       Temp       Temp src       SpO2       Weight       Height       Head Circumference       Peak Flow       Pain Score       Pain Loc       Pain Edu? Excl. in [address]? Constitutional: Well developed, Well nourished, nontoxic appearing  HENT: Normocephalic, Atraumatic, Bilateral external ears normal, Oropharynx moist, No oral exudates, Nose normal.   Eyes: PERRL, EOMI, Conjunctiva normal, No discharge. No scleral icterus. Neck: Normal range of motion, No tenderness, Supple. Lymphatic: No lymphadenopathy noted.    Cardiovascular: Normal heart rate, Normal rhythm, No murmurs, gallops or rubs. Thorax & Lungs: Normal breath sounds, No respiratory distress, No wheezing. Abdomen: Soft, tenderness to palpation of epigastrium without rebound or guarding, negative Champion sign, no masses, No pulsatile masses, No distention, Normal bowel sounds  Skin: Warm, Dry, Pink, No mottling, No erythema, No rash. Back: No tenderness, No CVA tenderness. Extremities: No cyanosis, Normal perfusion, No clubbing. Musculoskeletal: Cystic lesion overlying PIP of left index finger that is mildly tender to palpation. Full range of motion of flexion and extension to all joints of the left index finger. There is a very small amount of swelling to suprapatellar lesion of the left knee that has minimal tenderness to palpation. There is no overlying erythema to this. She has full range of motion to flexion and extension of the left knee. There is no pain with axial loading into the left knee. Left lower extremity is neurovascularly intact  Neurologic: Alert & oriented x 3, No focal deficits noted. Psychiatric: Affect normal, Judgment normal, Mood normal.     RADIOLOGY  Labs Reviewed   CBC WITH AUTO DIFFERENTIAL - Abnormal; Notable for the following components:       Result Value    RBC 3.62 (*)     Hemoglobin 9.3 (*)     Hematocrit 29.9 (*)     MCH 25.7 (*)     MCHC 31.1 (*)     Monocytes % 8.0 (*)     Eosinophils % 4.8 (*)     All other components within normal limits   HEPATIC FUNCTION PANEL - Abnormal; Notable for the following components:    AST 12 (*)     Total Protein 6.3 (*)     All other components within normal limits   BASIC METABOLIC PANEL   LIPASE     I personally reviewed the images. The radiologist's interpretation reveals:  Last Imaging results   CT ABDOMEN PELVIS W IV CONTRAST Additional Contrast? None   Final Result   1.  Jet-en-Y gastric bypass is redemonstrated. There could be some   thickening and gastritis of the gastric pouch and gastrojejunal junction.    However the gastric pouch is also under distended. The gastro jejunal limb   is not dilated. 2.  Pneumobilia is again noted. Dilated central intrahepatic tree and common   bile duct are similar to the previous exam.  Tiny calcification at the   pancreatic head appears to be outside the common bile duct and likely in the   pancreatic head. 3.  No bowel obstruction, ascites, or pneumoperitoneum. MEDS GIVEN IN ED:  Medications   pantoprazole (PROTONIX) injection 80 mg (80 mg IntraVENous Given 2/11/23 0135)   morphine (PF) injection 2 mg (2 mg IntraVENous Given 2/11/23 0135)   promethazine (PHENERGAN) injection 25 mg (25 mg IntraMUSCular Given 2/11/23 0135)   iopamidol (ISOVUE-370) 76 % injection 75 mL (75 mLs IntraVENous Given 2/11/23 0241)   morphine sulfate (PF) injection 4 mg (4 mg IntraVENous Given 2/11/23 0454)   ondansetron (ZOFRAN) injection 8 mg (8 mg IntraVENous Given 2/11/23 0453)   heparin flush 100 UNIT/ML injection 500 Units (500 Units IntraCATHeter Given 2/11/23 1439)     COURSE & MEDICAL DECISION MAKING    45 yr old female presents to the emergency department with complaints of abdominal pain, nausea, vomiting, lesion to left index finger, and left knee pain/swelling. She has history of chronic abdominal pain with previous Jet-en-Y gastric bypass as well as some ongoing orthopedic complaints including her left knee. Her initial vital signs demonstrate elevated blood pressure without evidence of fever. She is nontoxic-appearing on exam.  She does have some tenderness to palpation of the epigastrium without rebound or guarding with a negative Champion sign. She has a cystic lesion along the dorsal aspect of the left index finger which looks consistent with mucous cyst.  No signs of active infection related to this. There is a small amount of swelling noted to the suprapatellar region of the left knee but no overlying skin changes and the pain seems to be superior to the joint space.   She has full range of motion of flexion extension of the left knee and no pain with axial loading of the left knee. The left lower extremity is neurovascular intact. Differentials at this time include but not limited to: Gastritis, small bowel obstruction, anastomotic leak from Jet-en-Y gastric bypass. At this time we will obtain CBC, BMP, hepatic panel, lipase, and CT of the abdomen/pelvis    In the interim IV fluid bolus, Phenergan, morphine, Protonix ordered for patient. Laboratory studies are reassuring demonstrating chronic anemia and no evidence of transaminitis or elevation of lipase. Her CT imaging shows some thickening of the gastric pouch and gastrojejunal junction which may be secondary to gastritis. She has a chronic pneumobilia present as well as hepatic dilation which all looks to be chronic and without changes from previous imaging. Patient did require 1 additional dose of morphine while here but on reassessment she is feeling well and not having active vomiting tolerating oral intake. At this time given her reassuring evaluation I feel patient is appropriate for discharge home. We have prescribed Carafate for her to use in addition to her Protonix at home. She was instructed to follow-up with general surgery related to the gastritis findings on her Jet-en-Y gastric bypass and she plans to call their office on Monday. Return precautions provided      Amount and/or Complexity of Data Reviewed  Clinical lab tests: reviewed  Decide to obtain previous medical records or to obtain history from someone other than the patient: yes       -  Patient seen and evaluated in the emergency department. -  Triage and nursing notes reviewed and incorporated. -  Old chart records reviewed and incorporated. -  Work-up included:  See above      Appropriate PPE utilized as indicated for entire patient encounter? Time of Disposition: See timeline  ?   Discharge Medication List as of 2/11/2023  5:47 AM START taking these medications    Details   sucralfate (CARAFATE) 1 GM/10ML suspension Take 10 mLs by mouth 4 times daily for 21 days, Disp-840 mL, R-0Normal      promethazine (PHENERGAN) 25 MG tablet Take 1 tablet by mouth 4 times daily as needed for Nausea, Disp-20 tablet, R-0Normal      dicyclomine (BENTYL) 20 MG tablet Take 1 tablet by mouth 4 times daily as needed (Abdominal pain), Disp-20 tablet, R-0Normal           FINAL IMPRESSION  1. Nausea and vomiting, unspecified vomiting type    2. Abdominal pain, unspecified abdominal location    3. Acute gastritis, presence of bleeding unspecified, unspecified gastritis type    4. History of Jet-en-Y gastric bypass    5. Chronic pain of left knee        Electronically signed by:  Mercy Meza DO, 2/11/2023         Mercy Meza DO  02/11/23 6503

## 2023-02-11 NOTE — ED NOTES
Report received from [facility], [address] [place] [address] 111 Darrell Raza, RN  02/11/23 6472

## 2023-02-15 ENCOUNTER — OFFICE VISIT (OUTPATIENT)
Dept: ORTHOPEDIC SURGERY | Age: 53
End: 2023-02-15

## 2023-02-15 VITALS
HEART RATE: 75 BPM | WEIGHT: 234 LBS | HEIGHT: 64 IN | DIASTOLIC BLOOD PRESSURE: 91 MMHG | BODY MASS INDEX: 39.95 KG/M2 | OXYGEN SATURATION: 99 % | SYSTOLIC BLOOD PRESSURE: 151 MMHG

## 2023-02-15 DIAGNOSIS — M79.89 PAIN AND SWELLING OF LEFT LOWER LEG: Primary | ICD-10-CM

## 2023-02-15 DIAGNOSIS — M79.662 PAIN AND SWELLING OF LEFT LOWER LEG: Primary | ICD-10-CM

## 2023-02-15 DIAGNOSIS — R22.32 NODULE OF FINGER OF LEFT HAND: ICD-10-CM

## 2023-02-15 ASSESSMENT — ENCOUNTER SYMPTOMS
WHEEZING: 0
SHORTNESS OF BREATH: 0
ABDOMINAL PAIN: 0
COUGH: 0
VOMITING: 0
EYE PAIN: 0
NAUSEA: 0
FACIAL SWELLING: 0
COLOR CHANGE: 0
BACK PAIN: 0
PHOTOPHOBIA: 0
EYE REDNESS: 0
STRIDOR: 0

## 2023-02-15 NOTE — PATIENT INSTRUCTIONS
Continue to weight bear as tolerated  Continue range of motion  Ice and elevate as needed  Tylenol or Motrin for pain  Aspiration was done today in the office   Rest for 24-48 hours  Follow up in 6 weeks for L knee and L hand       We are committed to providing you the best care possible. If you receive a survey after visiting one of our offices, please take time to share your experience concerning your physician office visit. These surveys are confidential and no health information about you is shared. We are eager to improve for you and we are counting on your feedback to help make that happen.

## 2023-02-15 NOTE — PROGRESS NOTES
Patient is here for a follow up on her L Knee   DOS: 9/20/2022, Pain increased the last week   Pain: 6/10, took a percocet an hour ago  RICE and pain medication seem to help with pain but no completely  Patient stated that her knee is feeling like it did prior to surgery

## 2023-02-15 NOTE — PROGRESS NOTES
2/15/2023   Chief Complaint   Patient presents with    Follow-up     F/U L KNEE DOS 9/22/2022        History of Present Illness:                             Frances Ron is a 45 y.o. female     Date of surgery: 9-     Procedure:  1. Diagnostic and operative arthroscopy of Left knee with irrigation and debridement  2. Placement of Left knee drain      History:  Abram Eagle is here in follow up regarding their 6.5 month postop appointment for left knee procedure as discussed above    Patient is doing well. They have increased 6/10 pain this time but states that it fluctuates in she is having more and longer periods of no pain. They deny chest pain, SOB, calf pain,fever,wound drainage. No other issues. Patient denies any constitutional symptoms. She states her knee pain is overall improved. Patient contacted me over the weekend through the use of the  and was concerned that she was having continued knee pain as well as continued nausea on the left finger. She states that neither were specifically worse or concerning in any way but she wanted to make sure that there is nothing that she needed to do and that she did not need to go back to the emergency room. I told her that we will schedule her for the appointment today to be reevaluated. Patient states they have been compliant with restrictions. Patient has been ambulating without assistive device    Patient continues to get mild relief from ice and pain medication. Patient's next visit with the rheumatologist is next month. Medical History  Patient's medications, allergies, past medical, surgical, social and family histories were reviewed and updated as appropriate.     Past Medical History:   Diagnosis Date    Acid reflux     Anemia     Anxiety     Arthritis     Hands, Back And Ankles    Arthritis     Bleeding ulcer 2014    \"I Had Ulcers In My Stomach And Colon\"/ per pt on 8/12/2019\"they said recently having some blood in my stomach- in July ( 2019)could not find where coming from \"    Bronchitis Last Episode 2014    Chronic back pain     Chronic pain     [facility]    COPD (chronic obstructive pulmonary disease) ([address]     Sees Dr. Joanne Solis    Depression     Disease of blood and blood forming organ     Fibromyalgia Dx 2013    GERD (gastroesophageal reflux disease)     H/O echocardiogram 08/11/2015    EF >55%. LA to be at the upper limit of normal in size. LV hypertrophy with normal LV systolic, but abnormal diastolic function. Normal valvular structures and function. H/O echocardiogram 08/30/2018    EF 55-60%    Hiatal hernia     History of blood transfusion 09/2015 And 2018    No Reaction To Blood Transfusions Received    History of sleeve gastrectomy     [place] (hard of hearing)     Right Ear    Hx of cardiac catheterization 10/24/2010    Angiographically normal coronary arteries w/ normal LV function and wall motion. Hx of transesophageal echocardiography (PILO) for monitoring 12/02/2010    EF 55-60%. WNL.     HX OTHER MEDICAL     per old chart hx of sepsis and dx left 5th metatarsal MSSA osteomylitis- consult with Dr Charla Boyd     \"very difficulty IV stick- had mediport infection in the past- then put picc line in and removed 8/7/2019 now going to put new mediport in\"( 8/15/2019)    Hypertension     follow with  ? name    Hypertension     Immune deficiency disorder [facility])     Kidney cysts     \"they found that I have a kidney cyst but not sure which side\" per pt on 7/15/2020    Kidney stone     Lupus ([address] Dx 2013    \"Rheumatoid Lupus\"    MDRO (multiple drug resistant organisms) resistance     4 months ago chest from mediport    Morbid obesity ([address]     MRSA bacteremia     Nausea     \"Most Of The Time\"    Osteomyelitis of fifth toe of left foot (HCC)     Pain management     Sees Dr. Madiha Ross, Once A Month    Pancreatitis chronic Dx 2001    Pneumonia Dx 11-15    Seizures ([address] Shortness of breath on exertion     Shortness of breath on exertion     Sleep apnea     Has CPAP\"no longer use the cpap since lost weight\"    Staph infection Dx 11-15    Left Foot    Staph infection Dx 11-15    \"Left Foot\"    Teeth missing     Upper And Lower    Thyroid disease     Wears glasses     To Read     Past Surgical History:   Procedure Laterality Date    ABDOMEN SURGERY      APPENDECTOMY  02/1998    Done When Tubes And Ovaries Were Removed    APPENDECTOMY      CARDIAC CATHETERIZATION  10/24/2010    CATHETER REMOVAL N/A 7/16/2019    PORT REMOVAL performed by Bladimir Petty MD at [address] [place] [address]  08/27/1991    CHOLECYSTECTOMY      CHOLECYSTECTOMY, LAPAROSCOPIC  1990's    COLONOSCOPY  Last Done In 2000's    DENTAL SURGERY      Teeth Extracted In Past    ENDOSCOPY, COLON, DIAGNOSTIC  Last Done In 2018    FOOT DEBRIDEMENT Left 6/16/2019    FIRST METATARSAL DEBRIDEMENT INCISION AND DRAINAGE. EXCISION OF ULCER.  RESECTION OF BONE. 1ST METATARSAL POWER LAVAGE AND BONE CEMENT performed by Javier Frank DPM at [facility] Left 4/15/2022    LEFT FOOT ABSCESS DEBRIDEMENT INCISION AND DRAINAGE, CYST REMOVAL performed by Javier Frank DPM at [facility] [address] Left Last Done In 2016     Dr. Beata Sommers, \" About 6 Surgeries Done Because Of Staph Infection\"    HERNIA REPAIR      HIATAL HERNIA REPAIR N/A 2/12/2019    HERNIA HIATAL LAPAROSCOPIC ROBOTIC performed by Bladimir Petty MD at [place] [address] ([facility])  10/1997    Partial Abdominal Hysterectomy    HYSTERECTOMY (CERVIX STATUS UNKNOWN)      INSERTION / REMOVAL / REPLACEMENT VENOUS ACCESS CATHETER N/A 8/15/2019    PORT INSERTION performed by Bladimir Petty MD at [place] [address]. ARTHROSCOPY Left 9/20/2022    LEFT KNEE ARTHROSCOPIC IRRIGATION AND DEBRIDEMENT WITH DRAIN PLACEMENT performed by Gulshan Dalal DO at [address] [facility]  ~2000    removed after 6 months    LEG BIOPSY EXCISION Left 3/8/2021    LEFT THIGH LESION BIOPSY EXCISION performed by Jocelyn Orellana MD at [address] [facility], PARTIAL Left 2008    Benign    OTHER SURGICAL HISTORY  02/1998    \"Tubes And Ovaries Removed, Appendectomy Also Done\"    OTHER SURGICAL HISTORY  Last Done 7-15-16    Mediport Insertion \"Total Of Six Done, Removed Last Mediport In 2014\"    PORT SURGERY N/A 1/15/2020    PORT REMOVAL performed by Jocelyn Orellana MD at [address] N/A 7/6/2020    PORT INSERTION performed by Jocelyn Orellana MD at [address] Left 8/3/2021    MEDIPORT REPLACEMENT performed by Jocelyn Orellana MD at [address] [facility] N/A 8/16/2021    GASTRIC BYPASS RUFINO-EN-Y LAPAROSCOPIC ROBOTIC, LYSIS OF ADHESIONS performed by Jocelyn Orellana MD at [facility] N/A 2/12/2019    GASTRECTOMY SLEEVE LAPAROSCOPIC ROBOTIC performed by Jocelyn Orellana MD at [address] [place] [address]  08/27/2018    UPPER GASTROINTESTINAL ENDOSCOPY N/A 4/2/2019    EGD CONTROL HEMORRHAGE with epi injection at bleeding site performed by Jocelyn Orellana MD at [address] [facility] [address] 6/19/2019    EGD DIAGNOSTIC ONLY performed by Jocelyn Orellana MD at [place] [address] N/A 7/22/2019    EGD DIAGNOSTIC ONLY performed by Lalitha Landon MD at [address] [facility] [address] 10/14/2019    EGD DIAGNOSTIC ONLY performed by Jocelyn Orellana MD at [place] [address] N/A 7/17/2020    EGJ DILATATION BALLOON WITH 18-20 MM BALLOON, DILATED TO 20 MM. performed by Jocelyn Orellana MD at [place] [address] N/A 5/28/2021    EGD BIOPSY performed by Jocelyn Orellana MD at [facility]       Family History   Problem Relation Age of Onset    Diabetes Mother         \"Borderline Diabetes\"    High Blood Pressure Mother     Obesity Mother     Arthritis Mother     Heart Disease Mother     High Cholesterol Mother     Vision Loss Mother     Diabetes Father     High Blood Pressure Father     Asthma Father     Cancer Father         prostate cancer    High Blood Pressure Brother     Asthma Son     Vision Loss Son     Lupus Daughter     Other Daughter         \"Alot Of Female Problems\"     Social History     Socioeconomic History    Marital status:    Tobacco Use    Smoking status: Never    Smokeless tobacco: Never   Vaping Use    Vaping Use: Never used   Substance and Sexual Activity    Alcohol use: Never    Drug use: Never    Sexual activity: Not Currently   Social History Narrative    ** Merged History Encounter **          Current Outpatient Medications   Medication Sig Dispense Refill    sucralfate (CARAFATE) 1 GM/10ML suspension Take 10 mLs by mouth 4 times daily for 21 days 840 mL 0    promethazine (PHENERGAN) 25 MG tablet Take 1 tablet by mouth 4 times daily as needed for Nausea 20 tablet 0    dicyclomine (BENTYL) 20 MG tablet Take 1 tablet by mouth 4 times daily as needed (Abdominal pain) 20 tablet 0    pantoprazole (PROTONIX) 40 MG tablet Take 1 tablet by mouth twice daily 180 tablet 0    estradiol (ESTRACE) 0.5 MG tablet Take 1 tablet by mouth daily 30 tablet 1    hydrOXYzine HCl (ATARAX) 50 MG tablet Take 1 tablet by mouth 2 times daily as needed for Itching 30 tablet 0    scopolamine (TRANSDERM-SCOP) transdermal patch APPLY 1 PATCH TOPICALLY EVERY 72 HOURS AS NEEDED FOR NAUSEA FOR VOMITING 10 patch 0    ondansetron (ZOFRAN-ODT) 4 MG disintegrating tablet Take 1 tablet by mouth 3 times daily as needed for Nausea or Vomiting 30 tablet 1    gabapentin (NEURONTIN) 800 MG tablet Take 1 tablet by mouth 3 times daily for 4 days.  12 tablet 0    levETIRAcetam (KEPPRA) 1000 MG tablet Take 1 tablet by mouth twice daily 60 tablet 3    levothyroxine (SYNTHROID) 125 MCG tablet Take 1 tablet by mouth Daily 30 tablet 2    PARoxetine (PAXIL) 30 MG tablet TAKE 1 & 1/2 (ONE & ONE-HALF) TABLETS BY MOUTH NIGHTLY 135 tablet 0    ferrous sulfate (IRON 325) 325 (65 Fe) MG tablet Take 1 tablet by mouth 2 times daily 180 tablet 1    albuterol sulfate HFA (PROVENTIL;VENTOLIN;PROAIR) 108 (90 Base) MCG/ACT inhaler Inhale 2 puffs into the lungs 4 times daily 1 each 3    albuterol (PROVENTIL) (2.5 MG/3ML) 0.083% nebulizer solution Take 3 mLs by nebulization every 6 hours as needed for Wheezing 120 each 0    NARCAN 4 MG/0.1ML LIQD nasal spray USE AS DIRECTED AS NEEDED FOR SUSPECTED OPIOID OVERDOSE      Melatonin 10 MG TABS Take 10-20 mg by mouth nightly as needed      Cyanocobalamin (VITAMIN B 12 PO) Take 1 tablet by mouth daily      betamethasone valerate (VALISONE) 0.1 % ointment APPLY OINTMENT TO AFFECTED AREA TWICE DAILY TO HANDS AND ELBOWS FOR 21 DAYS      tiZANidine (ZANAFLEX) 4 MG tablet Take 4 mg by mouth every 8 hours as needed       Cholecalciferol (VITAMIN D3) 5000 units TABS Take 1 tablet by mouth daily       No current facility-administered medications for this visit. Allergies   Allergen Reactions    Aspirin Palpitations     \"My Heart Rate Elevates\"    Compazine [Prochlorperazine] Rash    Shellfish-Derived Products Swelling    Toradol [Ketorolac Tromethamine] Rash    Haldol [Haloperidol] Other (See Comments)     States \"shook severely and had to have Benadryl\"    Asa [Aspirin]     Compazine [Prochlorperazine]     Haldol [Haloperidol]      Shaking      Reglan [Metoclopramide] Itching    Reglan [Metoclopramide]     Toradol [Ketorolac Tromethamine]          Review of Systems   Constitutional:  Negative for activity change, appetite change, chills, diaphoresis, fatigue, fever and unexpected weight change. HENT:  Negative for congestion, ear pain, facial swelling, hearing loss and sneezing. Eyes:  Negative for photophobia, pain and redness.    Respiratory:  Negative for cough, shortness of breath, wheezing and stridor. Cardiovascular:  Negative for chest pain, palpitations and leg swelling. Gastrointestinal:  Negative for abdominal pain, nausea and vomiting. Endocrine: Negative for cold intolerance and heat intolerance. Musculoskeletal:  Positive for arthralgias. Negative for back pain, gait problem, joint swelling, myalgias, neck pain and neck stiffness. Skin:  Negative for color change, pallor, rash and wound. Neurological:  Negative for dizziness, facial asymmetry, weakness and numbness. Examination:  General Exam:  Vitals: BP (!) 151/91 (Site: Left Lower Arm, Position: Sitting) Comment: high for patient, in pain  Pulse 75   Ht 5' 4\" (1.626 m)   Wt 234 lb (106.1 kg) Comment: Patient reported  SpO2 99%   BMI 40.17 kg/m²    Physical Exam  Constitutional:       General: She is not in acute distress. Appearance: Normal appearance. She is obese. She is not ill-appearing. HENT:      Head: Normocephalic and atraumatic. Eyes:      General:         Right eye: No discharge. Left eye: No discharge. Extraocular Movements: Extraocular movements intact. Cardiovascular:      Pulses: Normal pulses. Pulmonary:      Effort: Pulmonary effort is normal.      Breath sounds: Normal breath sounds. Musculoskeletal:         General: Swelling and tenderness present. No signs of injury. Right shoulder: Normal.      Left shoulder: Normal.      Right upper arm: Normal.      Left upper arm: Normal.      Right elbow: Normal.      Left elbow: Normal.      Right forearm: Normal.      Left forearm: Normal.      Right wrist: Normal.      Left wrist: Normal.      Right hand: Normal.      Left hand: Swelling and deformity present. No lacerations, tenderness or bony tenderness. Normal range of motion. Normal strength. Normal sensation. There is no disruption of two-point discrimination. Normal capillary refill. Normal pulse.       Cervical back: Normal range of motion. Right hip: Normal.      Left hip: Normal.      Right upper leg: Normal.      Left upper leg: Normal.      Left knee: Bony tenderness and crepitus present. No swelling, deformity, effusion, erythema, ecchymosis or lacerations. Normal range of motion. Tenderness present over the medial joint line. No lateral joint line tenderness. No LCL laxity, MCL laxity, ACL laxity or PCL laxity. Normal alignment, normal meniscus and normal patellar mobility. Normal pulse. Instability Tests: Anterior drawer test negative. Posterior drawer test negative. Anterior Lachman test negative. Right lower leg: Normal. No edema. Left lower leg: Normal. No edema. Right ankle: Normal.      Left ankle: Normal.      Right foot: Normal.      Left foot: Normal.   Skin:     General: Skin is warm and dry. Capillary Refill: Capillary refill takes less than 2 seconds. Neurological:      General: No focal deficit present. Mental Status: She is alert and oriented to person, place, and time. Mental status is at baseline. Gait: Gait normal.   Psychiatric:         Mood and Affect: Mood normal.         Behavior: Behavior normal.        LEFT KNEE EXAMINATION       OBSERVATION / INSPECTION     Gait: Mildly antalgic     Alignment: Neutral     Scars: Well-healed previous arthroscopic incisions    Muscle atrophy: Minimal quad    Effusion: None     Warmth: None     Discoloration: none       TENDERNESS / CREPITUS (T / C):       Patella - / -     Lateral joint line - / -  Medial joint line  + / +     Peripatellar lateral - / -  Peripatellar medial  - / -     Medial plica - / -  Lateral plica - / -    Patellar tendon - / -   Prepatellar Bursa - / -     Popliteal fossa - / -    Gastrocnemius - / -    Quadricep - / -   Quad tendon - / -      Tibial tubercle - / -     Thigh/hamstring - / -  Pes anserine/HS - / -     ITB - / -     Tibia - / -   Fibula - / -    Tib/fib joint - / -     MFC - / -    LFC - / - MCL: Proximal - / -  Distal - / -    LCL - / -    MCL - / -      ROM: (* = pain)  PASSIVE  ACTIVE     Left :    0 / 115  0 / 105      Right :    0 / 125  0 / 125      PATELLOFEMORAL EXAMINATION:    See above noted areas of tenderness. Patella position     Subluxation / dislocation: Centered     Sup. / Inf; Normal     Crepitus (PF): Absent     Patellar Mobility:     Medial-lateral: Normal     Superior-inferior: Normal     Inferior tilt Normal     Patellar tendon: Normal     Lateral tilt: Normal     J-sign: None     Patellofemoral grind: Mild pain         MENISCAL SIGNS:     Pain on terminal extension: -    Pain on terminal flexion: -    Squat: Not tested      LIGAMENT EXAMINATION:    ACL / Lachman: normal (-1 to 2mm)      PCL-Post.  drawer: normal 0 to 2mm    MCL- Valgus: normal 0 to 2mm    LCL- Varus:  normal 0 to 2mm        STRENGTH: (* = with pain) PAINFUL SIDE    Quadricep 5/5    Hamstrin/5      EXTREMITY NEURO-VASCULAR EXAMINATION:     Sensation:  Grossly intact to light touch all dermatomal regions. Motor Function:  Fully intact motor function at hip, knee, foot and ankle      DTRs;  quadriceps and  achilles 2+. No clonus and downgoing Babinski. Vascular status:  DP and PT pulses 2+, brisk capillary refill, symmetric. Patient demonstrates appropriate mood and affect. Left lower extremity exam:   The incisions are well-healed clean, dry, intact, and nontender with no erythema. There is no drainage. They have no edema, the Arm compartments are soft . There are No cords or calf tenderness. No significant calf/ankle edema. They are neurovascularly intact distally. Range of motion of the right knee demonstrates near full painless range of motion without complaint    No areas of tenderness to palpation    Negative Petar's      LEFT HAND EXAM:    OBSERVATION / INSPECTION:     Swelling: Positive nodule over dorsum of PIP joint of index. No erythema.   Nodule appears to be mobile in nature. Unchanged in size from previous imaging    Deformity: Nodule as described above    Discoloration: none     Scars: none     Atrophy: none      TENDERNESS / CREPITUS (T / C):      1st Dorsal compartment -/-    FCR -/-    FCU -/-    Ulnar Styloid -/-    Radial Styloid -/-    Palm -/-    Metacarpals -/-    Thumb CMC -/-     Thumb MCP -/-    Lesser MCPs -/-    PIP -/-    DIP -/-      Range of motion: ('*' = with pain)       Left hand    Full extension of fingers, full flexion to the palm of all fingers        Right Elbow     AROM (PROM)     Extension 0 deg  (5 deg)     Flexion 145 deg (145 deg)        Pronation 90 deg  (90 deg)     Supination 80 deg  (80 deg)                Left Elbow     AROM (PROM)     Extension 0 deg  (5 deg)     Flexion 145 deg (145 deg)        Pronation 90 deg  (90 deg)     Supination 80 deg  (80 deg)          Right Wrist    Extension 80 deg (85 deg)     Flexion 80 deg (85 deg)        Ulnar Deviation  35 deg (40 deg)    Radial Deviation 35 deg (40 deg)             Left Wrist     AROM (PROM)     Extension 80 deg (85 deg)     Flexion 80 deg (85 deg)        Ulnar Deviation  35 deg (40 deg)    Radial Deviation 35 deg (40 deg)            STRENGTH: ('*' = with pain)     Elbow Flexion: 5/5    Elbow Extension: 5/5    Wrist Flexion: 5/5    Wrist Extension: 5/5    : 5/5    Intrinsics: 5/5    EPL (Extensor pollicis longus): 5/5    Pinch Mechanism: 5/5      EXTREMITY NEURO-VASCULAR EXAMINATION: Sensation grossly intact to light touch all dermatomal regions. DTR 2+ Biceps, Triceps, BR and Negative Gabi's sign. Grossly intact motor function at Elbow, Wrist and Hand. Distal pulses radial and ulnar 2+, brisk cap refill, symmetric. Diagnostic testing:  3 views of the left knee in a skeletally mature patient demonstrates no new osseous abnormalities. Overall alignment is neutral.  Mild medial joint space narrowing with osteophyte formation, unchanged from previous imaging.   No large knee effusion. No new osseous or soft tissue abnormality noted. 3 views of the left hand in a skeletally mature patient demonstrates no acute osseous abnormalities. Patient has minimal osteoarthritic changes underlying the soft tissue nodule seen in the dorsum of the index finger PIP joint. No additional osseous or soft tissue abnormalities noted. Alignment is appropriate throughout. Office Procedures:  Orders Placed This Encounter   Procedures    [id number] - AR DRAIN/INJECT LARGE JOINT/BURSA     Left index finger PIP jointdorsal mass aspiration procedure Note   Pre-operative Diagnosis: Left index finger PIP dorsal mass  Post-operative Diagnosis: Ganglion cyst dorsal finger cyst versus mucous cyst  Indications: Symptomatic relief of finger mass  Anesthesia: None  Procedure Details   Verbal consent was obtained for the procedure. The  dorsal index finger was prepped with alcohol. A 18 gauge needle was advanced into the dorsal mass without difficulty while holding the mass in place between my fingers. I was unable to release any of the fluid or decompress the mass after several attempts. The injection site was cleansed with isopropyl alcohol and a dressing was applied. Complications: None; patient tolerated the procedure well. Symptoms were improved immediately after the injection. Assessment and Plan    A: Left knee osteoarthritis    P:   I once again had a thorough conversation with the patient regarding her treatment course. I explained once again that there were no concerning findings on physical exam today in regards to the knee. -In regards to the hand, I explained that this is likely a benign cyst although we are unable to get any fluid out of it today or decompress the cyst, I have low concern based off its consistency. She will attempt to use compression and will continue to use the hand without restrictions.   If this does change in any way over the next 6 weeks, I will send her to hand for evaluation.  -Patient will follow-up in 6 weeks for reevaluation of the knee and the hand. We can consider cortisone injection versus gel injection in the future for the knee. I would likely consider gels in the near future due to the concern for previous infection and not wanting to cause any type of flareup with a cortisone injection and patient voices her understanding.  -continue the PT protocol   -Patient is weight-bear as tolerated, range of motion as tolerated.   No restrictions  -rest/elevation as needed  -DVT prophylaxis: None   -No refills needed  -f/u in 6 week(s)  -f/u sooner prn any issues       Electronically signed by Irving Leung DO on 2/15/2023 at 3:54 PM

## 2023-02-16 ENCOUNTER — HOSPITAL ENCOUNTER (EMERGENCY)
Age: 53
Discharge: HOME | End: 2023-02-16
Attending: EMERGENCY MEDICINE
Payer: COMMERCIAL

## 2023-02-16 VITALS
HEART RATE: 74 BPM | OXYGEN SATURATION: 100 % | TEMPERATURE: 98.7 F | WEIGHT: 234 LBS | RESPIRATION RATE: 18 BRPM | SYSTOLIC BLOOD PRESSURE: 115 MMHG | HEIGHT: 64 IN | BODY MASS INDEX: 39.95 KG/M2 | DIASTOLIC BLOOD PRESSURE: 51 MMHG

## 2023-02-16 DIAGNOSIS — R11.0 NAUSEA: ICD-10-CM

## 2023-02-16 DIAGNOSIS — R11.2 NAUSEA AND VOMITING, UNSPECIFIED VOMITING TYPE: ICD-10-CM

## 2023-02-16 DIAGNOSIS — R10.10 PAIN OF UPPER ABDOMEN: Primary | ICD-10-CM

## 2023-02-16 DIAGNOSIS — F32.A CHRONIC DEPRESSION: ICD-10-CM

## 2023-02-16 DIAGNOSIS — Z98.84 HISTORY OF ROUX-EN-Y GASTRIC BYPASS: ICD-10-CM

## 2023-02-16 LAB
ALBUMIN SERPL-MCNC: 3.9 GM/DL (ref 3.4–5)
ALP BLD-CCNC: 136 IU/L (ref 40–129)
ALT SERPL-CCNC: 8 U/L (ref 10–40)
ANION GAP SERPL CALCULATED.3IONS-SCNC: 10 MMOL/L (ref 4–16)
AST SERPL-CCNC: 12 IU/L (ref 15–37)
BASOPHILS ABSOLUTE: 0 K/CU MM
BASOPHILS RELATIVE PERCENT: 0.8 % (ref 0–1)
BILIRUB SERPL-MCNC: 0.2 MG/DL (ref 0–1)
BILIRUBIN DIRECT: 0.2 MG/DL (ref 0–0.3)
BILIRUBIN, INDIRECT: 0 MG/DL (ref 0–0.7)
BUN SERPL-MCNC: 11 MG/DL (ref 6–23)
CALCIUM SERPL-MCNC: 10.2 MG/DL (ref 8.3–10.6)
CHLORIDE BLD-SCNC: 107 MMOL/L (ref 99–110)
CO2: 23 MMOL/L (ref 21–32)
CREAT SERPL-MCNC: 0.7 MG/DL (ref 0.6–1.1)
DIFFERENTIAL TYPE: ABNORMAL
EOSINOPHILS ABSOLUTE: 0.2 K/CU MM
EOSINOPHILS RELATIVE PERCENT: 4.1 % (ref 0–3)
GFR SERPL CREATININE-BSD FRML MDRD: >60 ML/MIN/1.73M2
GLUCOSE SERPL-MCNC: 105 MG/DL (ref 70–99)
HCT VFR BLD CALC: 32.6 % (ref 37–47)
HEMOGLOBIN: 10 GM/DL (ref 12.5–16)
IMMATURE NEUTROPHIL %: 0.4 % (ref 0–0.43)
LIPASE: 13 IU/L (ref 13–60)
LYMPHOCYTES ABSOLUTE: 1.5 K/CU MM
LYMPHOCYTES RELATIVE PERCENT: 29.7 % (ref 24–44)
MCH RBC QN AUTO: 25.3 PG (ref 27–31)
MCHC RBC AUTO-ENTMCNC: 30.7 % (ref 32–36)
MCV RBC AUTO: 82.5 FL (ref 78–100)
MONOCYTES ABSOLUTE: 0.4 K/CU MM
MONOCYTES RELATIVE PERCENT: 8.6 % (ref 0–4)
NUCLEATED RBC %: 0 %
PDW BLD-RTO: 14.5 % (ref 11.7–14.9)
PLATELET # BLD: 236 K/CU MM (ref 140–440)
PMV BLD AUTO: 9.6 FL (ref 7.5–11.1)
POTASSIUM SERPL-SCNC: 3.9 MMOL/L (ref 3.5–5.1)
RBC # BLD: 3.95 M/CU MM (ref 4.2–5.4)
SEGMENTED NEUTROPHILS ABSOLUTE COUNT: 2.8 K/CU MM
SEGMENTED NEUTROPHILS RELATIVE PERCENT: 56.4 % (ref 36–66)
SODIUM BLD-SCNC: 140 MMOL/L (ref 135–145)
TOTAL IMMATURE NEUTOROPHIL: 0.02 K/CU MM
TOTAL NUCLEATED RBC: 0 K/CU MM
TOTAL PROTEIN: 6.6 GM/DL (ref 6.4–8.2)
WBC # BLD: 4.9 K/CU MM (ref 4–10.5)

## 2023-02-16 PROCEDURE — 96372 THER/PROPH/DIAG INJ SC/IM: CPT

## 2023-02-16 PROCEDURE — C9113 INJ PANTOPRAZOLE SODIUM, VIA: HCPCS | Performed by: EMERGENCY MEDICINE

## 2023-02-16 PROCEDURE — 6360000002 HC RX W HCPCS: Performed by: EMERGENCY MEDICINE

## 2023-02-16 PROCEDURE — 96374 THER/PROPH/DIAG INJ IV PUSH: CPT

## 2023-02-16 PROCEDURE — 2500000003 HC RX 250 WO HCPCS: Performed by: EMERGENCY MEDICINE

## 2023-02-16 PROCEDURE — A4216 STERILE WATER/SALINE, 10 ML: HCPCS | Performed by: EMERGENCY MEDICINE

## 2023-02-16 PROCEDURE — 6360000002 HC RX W HCPCS: Performed by: STUDENT IN AN ORGANIZED HEALTH CARE EDUCATION/TRAINING PROGRAM

## 2023-02-16 PROCEDURE — 99284 EMERGENCY DEPT VISIT MOD MDM: CPT

## 2023-02-16 PROCEDURE — 6370000000 HC RX 637 (ALT 250 FOR IP): Performed by: EMERGENCY MEDICINE

## 2023-02-16 PROCEDURE — 83690 ASSAY OF LIPASE: CPT

## 2023-02-16 PROCEDURE — 96375 TX/PRO/DX INJ NEW DRUG ADDON: CPT

## 2023-02-16 PROCEDURE — 82248 BILIRUBIN DIRECT: CPT

## 2023-02-16 PROCEDURE — 80053 COMPREHEN METABOLIC PANEL: CPT

## 2023-02-16 PROCEDURE — 85025 COMPLETE CBC W/AUTO DIFF WBC: CPT

## 2023-02-16 PROCEDURE — 2580000003 HC RX 258: Performed by: EMERGENCY MEDICINE

## 2023-02-16 RX ORDER — SCOLOPAMINE TRANSDERMAL SYSTEM 1 MG/1
PATCH, EXTENDED RELEASE TRANSDERMAL
Qty: 10 PATCH | Refills: 0 | Status: SHIPPED | OUTPATIENT
Start: 2023-02-16

## 2023-02-16 RX ORDER — FENTANYL CITRATE 50 UG/ML
25 INJECTION, SOLUTION INTRAMUSCULAR; INTRAVENOUS ONCE
Status: COMPLETED | OUTPATIENT
Start: 2023-02-16 | End: 2023-02-16

## 2023-02-16 RX ORDER — PAROXETINE 30 MG/1
TABLET, FILM COATED ORAL
Qty: 135 TABLET | Refills: 0 | Status: SHIPPED | OUTPATIENT
Start: 2023-02-16

## 2023-02-16 RX ORDER — PROMETHAZINE HYDROCHLORIDE 25 MG/1
25 TABLET ORAL EVERY 8 HOURS PRN
Qty: 20 TABLET | Refills: 0 | Status: SHIPPED | OUTPATIENT
Start: 2023-02-16 | End: 2023-02-23

## 2023-02-16 RX ORDER — LIDOCAINE HYDROCHLORIDE 20 MG/ML
15 SOLUTION OROPHARYNGEAL ONCE
Status: COMPLETED | OUTPATIENT
Start: 2023-02-16 | End: 2023-02-16

## 2023-02-16 RX ORDER — MORPHINE SULFATE 2 MG/ML
2 INJECTION, SOLUTION INTRAMUSCULAR; INTRAVENOUS ONCE
Status: COMPLETED | OUTPATIENT
Start: 2023-02-16 | End: 2023-02-16

## 2023-02-16 RX ORDER — HYDROXYZINE 50 MG/1
50 TABLET, FILM COATED ORAL 2 TIMES DAILY PRN
Qty: 30 TABLET | Refills: 0 | Status: SHIPPED | OUTPATIENT
Start: 2023-02-16

## 2023-02-16 RX ORDER — PROMETHAZINE HYDROCHLORIDE 25 MG/ML
25 INJECTION, SOLUTION INTRAMUSCULAR; INTRAVENOUS ONCE
Status: COMPLETED | OUTPATIENT
Start: 2023-02-16 | End: 2023-02-16

## 2023-02-16 RX ORDER — ONDANSETRON 4 MG/1
4 TABLET, ORALLY DISINTEGRATING ORAL 3 TIMES DAILY PRN
Qty: 30 TABLET | Refills: 1 | Status: SHIPPED | OUTPATIENT
Start: 2023-02-16

## 2023-02-16 RX ORDER — HEPARIN SODIUM (PORCINE) LOCK FLUSH IV SOLN 100 UNIT/ML 100 UNIT/ML
100 SOLUTION INTRAVENOUS ONCE
Status: COMPLETED | OUTPATIENT
Start: 2023-02-16 | End: 2023-02-16

## 2023-02-16 RX ORDER — 0.9 % SODIUM CHLORIDE 0.9 %
1000 INTRAVENOUS SOLUTION INTRAVENOUS ONCE
Status: COMPLETED | OUTPATIENT
Start: 2023-02-16 | End: 2023-02-16

## 2023-02-16 RX ORDER — PANTOPRAZOLE SODIUM 40 MG/10ML
40 INJECTION, POWDER, LYOPHILIZED, FOR SOLUTION INTRAVENOUS ONCE
Status: COMPLETED | OUTPATIENT
Start: 2023-02-16 | End: 2023-02-16

## 2023-02-16 RX ORDER — MAGNESIUM HYDROXIDE/ALUMINUM HYDROXICE/SIMETHICONE 120; 1200; 1200 MG/30ML; MG/30ML; MG/30ML
30 SUSPENSION ORAL ONCE
Status: COMPLETED | OUTPATIENT
Start: 2023-02-16 | End: 2023-02-16

## 2023-02-16 RX ADMIN — MORPHINE SULFATE 2 MG: 2 INJECTION, SOLUTION INTRAMUSCULAR; INTRAVENOUS at 06:31

## 2023-02-16 RX ADMIN — FAMOTIDINE 20 MG: 10 INJECTION, SOLUTION INTRAVENOUS at 05:32

## 2023-02-16 RX ADMIN — FENTANYL CITRATE 25 MCG: 50 INJECTION, SOLUTION INTRAMUSCULAR; INTRAVENOUS at 05:32

## 2023-02-16 RX ADMIN — PANTOPRAZOLE SODIUM 40 MG: 40 INJECTION, POWDER, FOR SOLUTION INTRAVENOUS at 05:32

## 2023-02-16 RX ADMIN — ALUMINUM HYDROXIDE, MAGNESIUM HYDROXIDE, AND SIMETHICONE 30 ML: 200; 200; 20 SUSPENSION ORAL at 05:32

## 2023-02-16 RX ADMIN — SODIUM CHLORIDE 1000 ML: 9 INJECTION, SOLUTION INTRAVENOUS at 05:31

## 2023-02-16 RX ADMIN — PROMETHAZINE HYDROCHLORIDE 25 MG: 25 INJECTION INTRAMUSCULAR; INTRAVENOUS at 05:31

## 2023-02-16 RX ADMIN — LIDOCAINE HYDROCHLORIDE 15 ML: 20 SOLUTION ORAL; TOPICAL at 05:32

## 2023-02-16 RX ADMIN — Medication 100 UNITS: at 07:26

## 2023-02-16 ASSESSMENT — PAIN SCALES - GENERAL
PAINLEVEL_OUTOF10: 6
PAINLEVEL_OUTOF10: 6
PAINLEVEL_OUTOF10: 7

## 2023-02-16 ASSESSMENT — PAIN - FUNCTIONAL ASSESSMENT: PAIN_FUNCTIONAL_ASSESSMENT: 0-10

## 2023-02-16 NOTE — ED PROVIDER NOTES
CHIEF COMPLAINT    Chief Complaint   Patient presents with    Abdominal Pain     Has been going on for a long time; mid upper with hx of chronic pancreatitis; constant sharp pain and takes percocet with little relief    Emesis     Started a week ago; was recently seen for same issues. HPI  Ron Whitley is a 45 y.o. female with history of lupus?,  Fibromyalgia, anxiety, depression, chronic abdominal pain, Jet-en-Y gastric bypass surgery who presents to the ED with complaints of abdominal pain, nausea, and vomiting. Patient was evaluated by myself on 02/11 with similar complaints of abdominal pain and nausea with vomiting. Her abdominal pain was predominantly in the upper abdomen at that time and CT imaging was without acute intra-abdominal pathology. Her laboratory studies during that visit including hepatic panel and lipase were reassuring. Patient was ultimately discharged home with a prescription for Phenergan, Carafate, and Bentyl and instructed to continue her PPI therapy. We discussed the importance of calling the general surgery team for follow-up appointment which patient voiced understanding of. She states that overnight she had worsening abdominal pain with intractable nausea and vomiting. The pain is located predominantly in the upper abdomen with radiation to the lower abdomen. The pain is described as a 7/10 throbbing stabbing pain exacerbated with eating and drinking as well as with nausea and vomiting. She continues to have normal bowel movements. Denies fevers, chills, chest pain, shortness of breath, dysuria, dizziness, lightheadedness. REVIEW OF SYSTEMS  Constitutional: No fever, chills   Eye: No visual changes  HENT: No earache or sore throat. Resp: No SOB or productive cough. Cardio: No chest pain or palpitations. GI: Complains abdominal pain, nausea, vomiting  : No dysuria, urgency or frequency.   Endocrine: No heat intolerance, no cold intolerance, no polydipsia Lymphatics: No adenopathy  Musculoskeletal: No new muscle aches  Neuro: No headaches. Psych: No homicidal or suicidal thoughts  Skin: No rash, No itching. ?  ? PAST MEDICAL HISTORY  Past Medical History:   Diagnosis Date    Acid reflux     Anemia     Anxiety     Arthritis     Hands, Back And Ankles    Arthritis     Bleeding ulcer 2014    \"I Had Ulcers In My Stomach And Colon\"/ per pt on 8/12/2019\"they said recently having some blood in my stomach- in July ( 2019)could not find where coming from \"    Bronchitis Last Episode 2014    Chronic back pain     Chronic pain     [facility]    COPD (chronic obstructive pulmonary disease) ([facility]     Sees Dr. Perry Aly    Depression     Disease of blood and blood forming organ     Fibromyalgia Dx 2013    GERD (gastroesophageal reflux disease)     H/O echocardiogram 08/11/2015    EF >55%. LA to be at the upper limit of normal in size. LV hypertrophy with normal LV systolic, but abnormal diastolic function. Normal valvular structures and function. H/O echocardiogram 08/30/2018    EF 55-60%    Hiatal hernia     History of blood transfusion 09/2015 And 2018    No Reaction To Blood Transfusions Received    History of sleeve gastrectomy     [place] (hard of hearing)     Right Ear    Hx of cardiac catheterization 10/24/2010    Angiographically normal coronary arteries w/ normal LV function and wall motion. Hx of transesophageal echocardiography (PILO) for monitoring 12/02/2010    EF 55-60%. WNL.     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 name    Hypertension     Immune deficiency disorder ([facility]     Kidney cysts     \"they found that I have a kidney cyst but not sure which side\" per pt on 7/15/2020    Kidney stone     Lupus ([address] Dx 2013    \"Rheumatoid Lupus\"    MDRO (multiple drug resistant organisms) resistance     4 months ago chest from mediport    Morbid obesity ([address]     MRSA bacteremia     Nausea     \"Most Of The Time\"    Osteomyelitis of fifth toe of left foot (HCC)     Pain management     Sees Dr. Jose Lara, Once A Month    Pancreatitis chronic Dx 2001    Pneumonia Dx 11-15    Seizures ([address]     Shortness of breath on exertion     Shortness of breath on exertion     Sleep apnea     Has CPAP\"no longer use the cpap since lost weight\"    Staph infection Dx 11-15    Left Foot    Staph infection Dx 11-15    \"Left Foot\"    Teeth missing     Upper And Lower    Thyroid disease     Wears glasses     To Read     FAMILY HISTORY  Family History   Problem Relation Age of Onset    Diabetes Mother         \"Borderline Diabetes\"    High Blood Pressure Mother     Obesity Mother     Arthritis Mother     Heart Disease Mother     High Cholesterol Mother     Vision Loss Mother     Diabetes Father     High Blood Pressure Father     Asthma Father     Cancer Father         prostate cancer    High Blood Pressure Brother     Asthma Son     Vision Loss Son     Lupus Daughter     Other Daughter         \"Alot Of Female Problems\"     SOCIAL HISTORY  Social History     Socioeconomic History    Marital status:      Spouse name: None    Number of children: None    Years of education: None    Highest education level: None   Tobacco Use    Smoking status: Never    Smokeless tobacco: Never   Vaping Use    Vaping Use: Never used   Substance and Sexual Activity    Alcohol use: Never    Drug use: Never    Sexual activity: Not Currently   Social History Narrative    ** Merged History Encounter **            SURGICAL HISTORY  Past Surgical History:   Procedure Laterality Date    ABDOMEN SURGERY      APPENDECTOMY  02/1998    Done When Tubes And Ovaries Were Removed    APPENDECTOMY      CARDIAC CATHETERIZATION  10/24/2010    CATHETER REMOVAL N/A 7/16/2019    PORT REMOVAL performed by Galo Henriquez MD at [facility]  08/27/1991    CHOLECYSTECTOMY      CHOLECYSTECTOMY, LAPAROSCOPIC  1990's    COLONOSCOPY  Last Done In 2000's    DENTAL SURGERY      Teeth Extracted In Past    ENDOSCOPY, COLON, DIAGNOSTIC  Last Done In 2018    FOOT DEBRIDEMENT Left 6/16/2019    FIRST METATARSAL DEBRIDEMENT INCISION AND DRAINAGE. EXCISION OF ULCER.  RESECTION OF BONE. 1ST METATARSAL POWER LAVAGE AND BONE CEMENT performed by Srinivasa Newton DPM at [address] Left 4/15/2022    LEFT FOOT ABSCESS DEBRIDEMENT INCISION AND DRAINAGE, CYST REMOVAL performed by Srinivasa Newton DPM at [address] [facility] Left Last Done In 2016     Dr. Emily Starr, \" About 6 Surgeries Done Because Of Staph Infection\"    HERNIA REPAIR      HIATAL HERNIA REPAIR N/A 2/12/2019    HERNIA HIATAL LAPAROSCOPIC ROBOTIC performed by Galo Henriquez MD at [place] [address] ([facility])  10/1997    Partial Abdominal Hysterectomy    HYSTERECTOMY (CERVIX STATUS UNKNOWN)      INSERTION / REMOVAL / REPLACEMENT VENOUS ACCESS CATHETER N/A 8/15/2019    PORT INSERTION performed by Galo Henriquez MD at [place] [address]. ARTHROSCOPY Left 9/20/2022    LEFT KNEE ARTHROSCOPIC IRRIGATION AND DEBRIDEMENT WITH DRAIN PLACEMENT performed by Holden Patten DO at [facility]  ~2000    removed after 6 months    LEG BIOPSY EXCISION Left 3/8/2021    LEFT THIGH LESION BIOPSY EXCISION performed by Galo Henriquez MD at [address] [place] [address], PARTIAL Left 2008    Benign    OTHER SURGICAL HISTORY  02/1998    \"Tubes And Ovaries Removed, Appendectomy Also Done\"    OTHER SURGICAL HISTORY  Last Done 7-15-16    Mediport Insertion \"Total Of Six Done, Removed Last Mediport In 2014\"    PORT SURGERY N/A 1/15/2020    PORT REMOVAL performed by Galo Henriquez MD at [address] N/A 7/6/2020    PORT INSERTION performed by Saba Silva MD at [facility] Left 8/3/2021    MEDIPORT REPLACEMENT performed by Saba Silva MD at [facility] N/A 8/16/2021    GASTRIC BYPASS RUFINO-EN-Y LAPAROSCOPIC ROBOTIC, LYSIS OF ADHESIONS performed by Sbaa Silva MD at [facility] N/A 2/12/2019    GASTRECTOMY SLEEVE LAPAROSCOPIC ROBOTIC performed by Saba Silva MD at [address] [place] [address]  08/27/2018    UPPER GASTROINTESTINAL ENDOSCOPY N/A 4/2/2019    EGD CONTROL HEMORRHAGE with epi injection at bleeding site performed by Saba Silva MD at [address] [place] [address] 6/19/2019    EGD DIAGNOSTIC ONLY performed by Saba Silva MD at [address] [place] [address] N/A 7/22/2019    EGD DIAGNOSTIC ONLY performed by Jenny Jaimes MD at [address] [place] [address] 10/14/2019    EGD DIAGNOSTIC ONLY performed by Saba Silva MD at [address] [place] [address] N/A 7/17/2020    EGJ DILATATION BALLOON WITH 18-20 MM BALLOON, DILATED TO 20 MM. performed by Saba Silva MD at [address] [place] [address] 5/28/2021    EGD BIOPSY performed by Saba Silva MD at [address] [place] [address] Road  Previous Medications    ALBUTEROL (PROVENTIL) (2.5 MG/3ML) 0.083% NEBULIZER SOLUTION    Take 3 mLs by nebulization every 6 hours as needed for Wheezing    ALBUTEROL SULFATE HFA (PROVENTIL;VENTOLIN;PROAIR) 108 (90 BASE) MCG/ACT INHALER    Inhale 2 puffs into the lungs 4 times daily    BETAMETHASONE VALERATE (VALISONE) 0.1 % OINTMENT    APPLY OINTMENT TO AFFECTED AREA TWICE DAILY TO HANDS AND ELBOWS FOR 21 DAYS    CHOLECALCIFEROL (VITAMIN D3) 5000 UNITS TABS    Take 1 tablet by mouth daily    CYANOCOBALAMIN (VITAMIN B 12 PO)    Take 1 tablet by mouth daily    DICYCLOMINE (BENTYL) 20 MG TABLET    Take 1 tablet by mouth 4 times daily as needed (Abdominal pain)    ESTRADIOL (ESTRACE) 0.5 MG TABLET    Take 1 tablet by mouth daily    FERROUS SULFATE (IRON 325) 325 (65 FE) MG TABLET    Take 1 tablet by mouth 2 times daily    GABAPENTIN (NEURONTIN) 800 MG TABLET    Take 1 tablet by mouth 3 times daily for 4 days.     HYDROXYZINE HCL (ATARAX) 50 MG TABLET    Take 1 tablet by mouth 2 times daily as needed for Itching    LEVETIRACETAM (KEPPRA) 1000 MG TABLET    Take 1 tablet by mouth twice daily    LEVOTHYROXINE (SYNTHROID) 125 MCG TABLET    Take 1 tablet by mouth Daily    MELATONIN 10 MG TABS    Take 10-20 mg by mouth nightly as needed    NARCAN 4 MG/0.1ML LIQD NASAL SPRAY    USE AS DIRECTED AS NEEDED FOR SUSPECTED OPIOID OVERDOSE    ONDANSETRON (ZOFRAN-ODT) 4 MG DISINTEGRATING TABLET    Take 1 tablet by mouth 3 times daily as needed for Nausea or Vomiting    PANTOPRAZOLE (PROTONIX) 40 MG TABLET    Take 1 tablet by mouth twice daily    PAROXETINE (PAXIL) 30 MG TABLET    TAKE 1 & 1/2 (ONE & ONE-HALF) TABLETS BY MOUTH NIGHTLY    PROMETHAZINE (PHENERGAN) 25 MG TABLET    Take 1 tablet by mouth 4 times daily as needed for Nausea    SCOPOLAMINE (TRANSDERM-SCOP) TRANSDERMAL PATCH    APPLY 1 PATCH TOPICALLY EVERY 72 HOURS AS NEEDED FOR NAUSEA FOR VOMITING    SUCRALFATE (CARAFATE) 1 GM/10ML SUSPENSION    Take 10 mLs by mouth 4 times daily for 21 days    TIZANIDINE (ZANAFLEX) 4 MG TABLET    Take 4 mg by mouth every 8 hours as needed      ALLERGIES  Allergies   Allergen Reactions    Aspirin Palpitations     \"My Heart Rate Elevates\"    Compazine [Prochlorperazine] Rash    Shellfish-Derived Products Swelling    Toradol [Ketorolac Tromethamine] Rash    Haldol [Haloperidol] Other (See Comments)     States \"shook severely and had to have Benadryl\"    Asa [Aspirin]     Compazine [Prochlorperazine]     Haldol [Haloperidol]      Shaking      Reglan [Metoclopramide] Itching    Reglan [Metoclopramide]     Toradol [Ketorolac Tromethamine]        Nursing notes reviewed by myself for past medical history, family history, social history, surgical history, current medications, and allergies. PHYSICAL EXAM  VITAL SIGNS: Triage VS:    ED Triage Vitals [02/16/23 0419]   Enc Vitals Group      BP (!) 115/51      Heart Rate 74      Resp 18      Temp 98.7 °F (37.1 °C)      Temp src       SpO2 100 %      Weight 234 lb (106.1 kg)      Height 5' 4\" (1.626 m)      Head Circumference       Peak Flow       Pain Score       Pain Loc       Pain Edu? Excl. in [address]? Constitutional: Well developed, Well nourished, nontoxic-appearing  HENT: Normocephalic, Atraumatic, Bilateral external ears normal, Oropharynx moist, No oral exudates, Nose normal.   Eyes: PERRL, EOMI, Conjunctiva normal, No discharge. No scleral icterus. Neck: Normal range of motion, No tenderness, Supple. Lymphatic: No lymphadenopathy noted. Cardiovascular: Normal heart rate, Normal rhythm, No murmurs, gallops or rubs. Thorax & Lungs: Normal breath sounds, No respiratory distress, No wheezing. Abdomen: Soft, mild tenderness palpation of upper abdomen without rebound or guarding, no masses, No pulsatile masses, No distention, Normal bowel sounds  Skin: Warm, Dry, Pink, No mottling, No erythema, No rash. Back: No tenderness, No CVA tenderness. Extremities: No edema, No tenderness, No cyanosis, Normal perfusion, No clubbing. Musculoskeletal: Good range of motion in all major joints as observed. No major deformities noted. Neurologic: Alert & oriented x 3,  No focal deficits noted.    Psychiatric: Affect normal, Judgment normal, Mood normal.     RADIOLOGY  Labs Reviewed   CBC WITH AUTO DIFFERENTIAL - Abnormal; Notable for the following components:       Result Value    RBC 3.95 (*)     Hemoglobin 10.0 (*)     Hematocrit 32.6 (*)     MCH 25.3 (*)     MCHC 30.7 (*)     Monocytes % 8.6 (*)     Eosinophils % 4.1 (*)     All other components within normal limits   BASIC METABOLIC PANEL - Abnormal; Notable for the following components:    Glucose 105 (*)     All other components within normal limits   HEPATIC FUNCTION PANEL - Abnormal; Notable for the following components:    Alkaline Phosphatase 136 (*)     AST 12 (*)     ALT 8 (*)     All other components within normal limits   LIPASE     I personally reviewed the images. The radiologist's interpretation reveals:  Last Imaging results   No orders to display       MEDS GIVEN IN ED:  Medications   morphine (PF) injection 2 mg (has no administration in time range)   promethazine (PHENERGAN) injection 25 mg (25 mg IntraMUSCular Given 2/16/23 0531)   famotidine (PEPCID) 20 mg in sodium chloride (PF) 0.9 % 10 mL injection (20 mg IntraVENous Given 2/16/23 0532)   pantoprazole (PROTONIX) injection 40 mg (40 mg IntraVENous Given 2/16/23 0532)   0.9 % sodium chloride bolus (1,000 mLs IntraVENous New Bag 2/16/23 0531)   aluminum & magnesium hydroxide-simethicone (MAALOX) 200-200-20 MG/5ML suspension 30 mL (30 mLs Oral Given 2/16/23 0532)   lidocaine viscous hcl (XYLOCAINE) 2 % solution 15 mL (15 mLs Mouth/Throat Given 2/16/23 0532)   fentaNYL (SUBLIMAZE) injection 25 mcg (25 mcg IntraVENous Given 2/16/23 0532)     COURSE & MEDICAL DECISION MAKING  This is a 52-year-old female with history of Jet-en-Y gastric bypass surgery and chronic abdominal pain who presents to the emergency department with complaints of acute on chronic abdominal pain predominately located in the upper abdomen. Evaluated on 02/11 with similar complaints at which time she had reassuring laboratory studies as well as CT imaging of the abdomen/pelvis. Initial vital signs demonstrate blood pressure 115/51 without tachycardia or fever. On exam she is nontoxic-appearing and does have tenderness to palpation of upper abdomen without rebound or guarding. At this time we will obtain CBC, BMP, hepatic panel, and lipase.   Given recent CT imaging for same complaint which is reassuring I do not feel patient requires emergent imaging currently but if no improvement of symptoms or significantly abnormal labs will obtain CT imaging. CBC shows stable anemia with hemoglobin 10.0. BMP is reassuring. Hepatic panel shows mildly elevated alkaline phosphatase of 136 which is baseline for the patient. Lipase is nonelevated. Following IV fluid bolus, fentanyl, Phenergan, Pepcid, Protonix patient had resolution of nausea but continued have some pain. Therefore a dose of morphine was ordered and plan will be to discharge patient home at this time. She was instructed on the importance of following up with general surgery for endoscopy by calling the office of Dr. Alyssa Strickland. Patient has antiemetics, PPI, Carafate, and pain meds at home already. Discharged with strict return precautions. Amount and/or Complexity of Data Reviewed  Clinical lab tests: reviewed  Decide to obtain previous medical records or to obtain history from someone other than the patient: yes       -  Patient seen and evaluated in the emergency department. -  Triage and nursing notes reviewed and incorporated. -  Old chart records reviewed and incorporated. -  Work-up included:  See above      Appropriate PPE utilized as indicated for entire patient encounter? Time of Disposition: See timeline      Independent Imaging Interpretation by me: None     EKG (if obtained): None     Chronic conditions affecting care: History of Jet-en-Y gastric bypass surgery, chronic abdominal pain, chronic anemia     Discussion with Other Profesionals : None       Social Determinants : None     Records Reviewed :                I am the Primary Clinician of Record. ?  New Prescriptions    No medications on file     FINAL IMPRESSION  1. Pain of upper abdomen    2. Nausea and vomiting, unspecified vomiting type    3. History of Jet-en-Y gastric bypass        Electronically signed by:  Robbie Leonardo Morgan DO, 2/16/2023         Marty Moses DO  02/16/23 2273

## 2023-02-16 NOTE — TELEPHONE ENCOUNTER
----- Message from Andrew Horn sent at 2/16/2023 12:19 PM EST -----  Subject: Refill Request    QUESTIONS  Name of Medication? scopolamine (TRANSDERM-SCOP) transdermal patch  Patient-reported dosage and instructions? every 3 days  How many days do you have left? 6  Preferred Pharmacy? [facility] phone number (if available)? [phone]  ---------------------------------------------------------------------------  --------------,  Name of Medication? hydrOXYzine HCl (ATARAX) 50 MG tablet  Patient-reported dosage and instructions? 2 times day  How many days do you have left? 3  Preferred Pharmacy? [facility] phone number (if available)? [phone]  ---------------------------------------------------------------------------  --------------,  Name of Medication? ondansetron (ZOFRAN-ODT) 4 MG disintegrating tablet  Patient-reported dosage and instructions? as needed every 6 hrs  How many days do you have left? 6  Preferred Pharmacy? [facility] phone number (if available)? [phone]  ---------------------------------------------------------------------------  --------------,  Name of Medication? PARoxetine (PAXIL) 30 MG tablet  Patient-reported dosage and instructions? 1 daily  How many days do you have left? 3  Preferred Pharmacy? [facility]  Pharmacy phone number (if available)? [phone]  ---------------------------------------------------------------------------  --------------  CALL BACK INFO  What is the best way for the office to contact you? OK to leave message on   voicemail  Preferred Call Back Phone Number? [phone]  ---------------------------------------------------------------------------  --------------  SCRIPT ANSWERS  Relationship to Patient?  Self

## 2023-02-16 NOTE — ED NOTES
Discharge instructions reviewed with pt. All questions answered at this time. Pt verbalized understanding.       [facility]nu [facility]Lif[facility]  02/16/23 8345

## 2023-02-23 ENCOUNTER — HOSPITAL ENCOUNTER (EMERGENCY)
Age: 53
Discharge: HOME | End: 2023-02-23
Payer: COMMERCIAL

## 2023-02-23 ENCOUNTER — APPOINTMENT (OUTPATIENT)
Dept: GENERAL RADIOLOGY | Age: 53
End: 2023-02-23
Payer: COMMERCIAL

## 2023-02-23 ENCOUNTER — APPOINTMENT (OUTPATIENT)
Dept: ULTRASOUND IMAGING | Age: 53
End: 2023-02-23
Payer: COMMERCIAL

## 2023-02-23 VITALS
WEIGHT: 234 LBS | RESPIRATION RATE: 17 BRPM | OXYGEN SATURATION: 98 % | BODY MASS INDEX: 39.95 KG/M2 | DIASTOLIC BLOOD PRESSURE: 65 MMHG | HEIGHT: 64 IN | HEART RATE: 70 BPM | TEMPERATURE: 98.3 F | SYSTOLIC BLOOD PRESSURE: 125 MMHG

## 2023-02-23 DIAGNOSIS — R11.2 NAUSEA AND VOMITING, UNSPECIFIED VOMITING TYPE: ICD-10-CM

## 2023-02-23 DIAGNOSIS — R10.10 PAIN OF UPPER ABDOMEN: ICD-10-CM

## 2023-02-23 DIAGNOSIS — M25.562 CHRONIC PAIN OF LEFT KNEE: Primary | ICD-10-CM

## 2023-02-23 DIAGNOSIS — M19.90 ARTHRITIS: ICD-10-CM

## 2023-02-23 DIAGNOSIS — G89.29 CHRONIC PAIN OF LEFT KNEE: Primary | ICD-10-CM

## 2023-02-23 LAB
ALBUMIN SERPL-MCNC: 4.1 GM/DL (ref 3.4–5)
ALP BLD-CCNC: 147 IU/L (ref 40–129)
ALT SERPL-CCNC: 10 U/L (ref 10–40)
ANION GAP SERPL CALCULATED.3IONS-SCNC: 12 MMOL/L (ref 4–16)
AST SERPL-CCNC: 18 IU/L (ref 15–37)
BASOPHILS ABSOLUTE: 0 K/CU MM
BASOPHILS RELATIVE PERCENT: 0.7 % (ref 0–1)
BILIRUB SERPL-MCNC: 0.2 MG/DL (ref 0–1)
BUN SERPL-MCNC: 10 MG/DL (ref 6–23)
CALCIUM SERPL-MCNC: 10.8 MG/DL (ref 8.3–10.6)
CHLORIDE BLD-SCNC: 107 MMOL/L (ref 99–110)
CO2: 22 MMOL/L (ref 21–32)
CREAT SERPL-MCNC: 0.6 MG/DL (ref 0.6–1.1)
DIFFERENTIAL TYPE: ABNORMAL
EOSINOPHILS ABSOLUTE: 0.2 K/CU MM
EOSINOPHILS RELATIVE PERCENT: 3.7 % (ref 0–3)
GFR SERPL CREATININE-BSD FRML MDRD: >60 ML/MIN/1.73M2
GLUCOSE SERPL-MCNC: 92 MG/DL (ref 70–99)
HCT VFR BLD CALC: 33.7 % (ref 37–47)
HEMOGLOBIN: 10.4 GM/DL (ref 12.5–16)
HIGH SENSITIVE C-REACTIVE PROTEIN: 43.1 MG/L (ref 0–5)
IMMATURE NEUTROPHIL %: 0.2 % (ref 0–0.43)
LACTATE: 1.1 MMOL/L (ref 0.5–1.9)
LYMPHOCYTES ABSOLUTE: 1.3 K/CU MM
LYMPHOCYTES RELATIVE PERCENT: 29.8 % (ref 24–44)
MCH RBC QN AUTO: 25.3 PG (ref 27–31)
MCHC RBC AUTO-ENTMCNC: 30.9 % (ref 32–36)
MCV RBC AUTO: 82 FL (ref 78–100)
MONOCYTES ABSOLUTE: 0.2 K/CU MM
MONOCYTES RELATIVE PERCENT: 5.5 % (ref 0–4)
NUCLEATED RBC %: 0 %
PDW BLD-RTO: 14 % (ref 11.7–14.9)
PLATELET # BLD: 276 K/CU MM (ref 140–440)
PMV BLD AUTO: 9.9 FL (ref 7.5–11.1)
POTASSIUM SERPL-SCNC: 4.7 MMOL/L (ref 3.5–5.1)
RBC # BLD: 4.11 M/CU MM (ref 4.2–5.4)
SEGMENTED NEUTROPHILS ABSOLUTE COUNT: 2.6 K/CU MM
SEGMENTED NEUTROPHILS RELATIVE PERCENT: 60.1 % (ref 36–66)
SODIUM BLD-SCNC: 141 MMOL/L (ref 135–145)
TOTAL IMMATURE NEUTOROPHIL: 0.01 K/CU MM
TOTAL NUCLEATED RBC: 0 K/CU MM
TOTAL PROTEIN: 7.1 GM/DL (ref 6.4–8.2)
WBC # BLD: 4.3 K/CU MM (ref 4–10.5)

## 2023-02-23 PROCEDURE — 85025 COMPLETE CBC W/AUTO DIFF WBC: CPT

## 2023-02-23 PROCEDURE — 6360000002 HC RX W HCPCS: Performed by: NURSE PRACTITIONER

## 2023-02-23 PROCEDURE — 6370000000 HC RX 637 (ALT 250 FOR IP): Performed by: PHYSICIAN ASSISTANT

## 2023-02-23 PROCEDURE — 93971 EXTREMITY STUDY: CPT

## 2023-02-23 PROCEDURE — 83605 ASSAY OF LACTIC ACID: CPT

## 2023-02-23 PROCEDURE — 96374 THER/PROPH/DIAG INJ IV PUSH: CPT

## 2023-02-23 PROCEDURE — 86141 C-REACTIVE PROTEIN HS: CPT

## 2023-02-23 PROCEDURE — 6360000002 HC RX W HCPCS: Performed by: PHYSICIAN ASSISTANT

## 2023-02-23 PROCEDURE — 80053 COMPREHEN METABOLIC PANEL: CPT

## 2023-02-23 PROCEDURE — 99284 EMERGENCY DEPT VISIT MOD MDM: CPT

## 2023-02-23 PROCEDURE — 96375 TX/PRO/DX INJ NEW DRUG ADDON: CPT

## 2023-02-23 PROCEDURE — 73560 X-RAY EXAM OF KNEE 1 OR 2: CPT

## 2023-02-23 PROCEDURE — 76882 US LMTD JT/FCL EVL NVASC XTR: CPT

## 2023-02-23 RX ORDER — ONDANSETRON 4 MG/1
4 TABLET, ORALLY DISINTEGRATING ORAL ONCE
Status: COMPLETED | OUTPATIENT
Start: 2023-02-23 | End: 2023-02-23

## 2023-02-23 RX ORDER — HEPARIN SODIUM (PORCINE) LOCK FLUSH IV SOLN 100 UNIT/ML 100 UNIT/ML
100 SOLUTION INTRAVENOUS ONCE
Status: COMPLETED | OUTPATIENT
Start: 2023-02-23 | End: 2023-02-23

## 2023-02-23 RX ORDER — PROMETHAZINE HYDROCHLORIDE 25 MG/1
25 TABLET ORAL ONCE
Status: COMPLETED | OUTPATIENT
Start: 2023-02-23 | End: 2023-02-23

## 2023-02-23 RX ORDER — PROMETHAZINE HYDROCHLORIDE 25 MG/1
25 TABLET ORAL EVERY 6 HOURS PRN
Qty: 20 TABLET | Refills: 0 | Status: SHIPPED | OUTPATIENT
Start: 2023-02-23 | End: 2023-03-02

## 2023-02-23 RX ORDER — ONDANSETRON 2 MG/ML
4 INJECTION INTRAMUSCULAR; INTRAVENOUS EVERY 6 HOURS PRN
Status: DISCONTINUED | OUTPATIENT
Start: 2023-02-23 | End: 2023-02-23 | Stop reason: HOSPADM

## 2023-02-23 RX ORDER — OXYCODONE HYDROCHLORIDE AND ACETAMINOPHEN 5; 325 MG/1; MG/1
1 TABLET ORAL ONCE
Status: COMPLETED | OUTPATIENT
Start: 2023-02-23 | End: 2023-02-23

## 2023-02-23 RX ADMIN — OXYCODONE AND ACETAMINOPHEN 1 TABLET: 5; 325 TABLET ORAL at 20:59

## 2023-02-23 RX ADMIN — ONDANSETRON 4 MG: 4 TABLET, ORALLY DISINTEGRATING ORAL at 20:59

## 2023-02-23 RX ADMIN — PROMETHAZINE HYDROCHLORIDE 25 MG: 25 TABLET ORAL at 21:00

## 2023-02-23 RX ADMIN — HYDROMORPHONE HYDROCHLORIDE 0.5 MG: 1 INJECTION, SOLUTION INTRAMUSCULAR; INTRAVENOUS; SUBCUTANEOUS at 18:05

## 2023-02-23 RX ADMIN — ONDANSETRON 4 MG: 2 INJECTION INTRAMUSCULAR; INTRAVENOUS at 18:05

## 2023-02-23 RX ADMIN — HEPARIN SODIUM (PORCINE) LOCK FLUSH IV SOLN 100 UNIT/ML 100 UNITS: 100 SOLUTION at 21:06

## 2023-02-23 ASSESSMENT — PAIN DESCRIPTION - ORIENTATION: ORIENTATION: LEFT

## 2023-02-23 ASSESSMENT — PAIN SCALES - GENERAL
PAINLEVEL_OUTOF10: 8
PAINLEVEL_OUTOF10: 7

## 2023-02-23 ASSESSMENT — PAIN DESCRIPTION - LOCATION: LOCATION: KNEE

## 2023-02-23 NOTE — ED PROVIDER NOTES
Emergency Department Encounter  Location: [address] [place] [address] EMERGENCY DEPARTMENT    Patient: Linda Nichols  MRN: [id number]  : 1970  Date of evaluation: 2023  ED Provider: Marion Carrasco PA-C    [facility] was checked out to me by ANIBAL Palomares. Please see his/her initial documentation for details of the patient's initial ED presentation, physical exam and completed studies. In brief, Linda Nichols is a 45 y.o. female that presented to the emergency department for new left-sided knee pain. Patient has a history of knee effusion knee arthritis. Septic fusion in the past.  She has had increased pain. As well as swelling so she came in for evaluation with concern of DVT versus infection. GENERAL APPEARANCE: Awake and alert. Cooperative. No acute distress. HEAD: Normocephalic. Atraumatic. EYES: EOM's grossly intact. Sclera anicteric. ENT: Mucous membranes are moist. Tolerates saliva. No trismus. NECK: Supple. No meningismus. Trachea midline. HEART: RRR. Radial pulses 2+. LUNGS: Respirations unlabored. CTAB  ABDOMEN: Soft. Non-tender. No guarding or rebound. EXTREMITIES: Left knee shows full range of motion throughout. There is no palpable cords or visible varicosities along left lower extremity. Compartments are soft proximally distally DP/PT pulse 2+. There is no erythema no breaks in skin. Large joint effusion suprapatellar is noted. Tender to palpation but no redness. SKIN: Warm and dry. NEUROLOGICAL: No gross facial drooping. Moves all 4 extremities spontaneously. PSYCHIATRIC: Normal mood. I have reviewed and interpreted all of the currently available lab results and diagnostics from this visit:  No results found for this visit on 23.   XR KNEE LEFT (1-2 VIEWS)    Result Date: 2023  EXAMINATION: TWO XRAY VIEWS OF THE LEFT KNEE 2023 4:38 pm COMPARISON: Left knee radiograph 2022 HISTORY: ORDERING SYSTEM PROVIDED HISTORY: left knee pain TECHNOLOGIST PROVIDED HISTORY: Reason for exam:->left knee pain Reason for Exam: pain Additional signs and symptoms: unknown Relevant Medical/Surgical History: knee arthroscopy FINDINGS: No acute fracture. Joints maintain anatomic alignment. Tiny patellar enthesophyte at the insertion of the quadriceps tendon. Mild arthropathic changes of the medial compartment with minimal mint of the patellofemoral and lateral compartments. Small to moderate suprapatellar effusion. No obvious acute soft tissue abnormality. 1. No acute findings in the left knee. 2. Minimal to mild tricompartmental osteoarthritic changes of the left knee associated with small to moderate suprapatellar effusion. Final ED Course and MDM: In brief, Sabra Reardon is a 45 y.o. female whose care was signed out to me by the outgoing provider. In brief, patient has a history of left-sided knee arthritis. I do believe this is arthritic flare with a suprapatellar joint effusion. Patient was provided with Dilaudid prior to me assuming care for patient she is also provided with Zofran. She states Phenergan works better than Zofran so will provide p.o. Phenergan p.o. Percocet. Ace wrap patient will be discharged told to follow-up with her outpatient. As there is no profound infectious markers. Patient's white blood cell count within normal limits. Her CMP shows no acute abnormality lactic within normal limits CRP is mildly elevated at 43. However not profoundly elevated do not believe this is clinically significantly elevated.      ED Medication Orders (From admission, onward)      Start Ordered     Status Ordering Provider    02/23/23 1630 02/23/23 1627  HYDROmorphone (DILAUDID) injection 0.5 mg  ONCE         Last MAR action: Given - by Simran Hi. on 02/23/23 at [facility], APRIL    02/23/23 1627 02/23/23 1627  ondansetron (ZOFRAN) injection 4 mg  EVERY 6 HOURS PRN         Last MAR action: Given - by Susy Briseno on 02/23/23 at [facility]            Final Impression    No diagnosis found.     DISPOSITION       (Please note that portions of this note may have been completed with a voice recognition program. Efforts were made to edit the dictations but occasionally words are mis-transcribed.)    Karen Helton7

## 2023-02-23 NOTE — PLAN OF CARE
per Dante Madison RN she needs about 30 minutes to finish patient assessment meds, etc, noted, jy 1807 hrs

## 2023-02-23 NOTE — ED PROVIDER NOTES
**ADVANCED PRACTICE PROVIDER, I HAVE EVALUATED THIS PATIENT**        [facility] EMERGENCY DEPARTMENT  EMERGENCY DEPARTMENT ENCOUNTER      Pt Name: Andre Pope  MRN:[id number]  Birthdate 1970  Date of evaluation: 2/23/2023  Provider: MICHAEL Méndze - CNP      Chief Complaint:    Chief Complaint   Patient presents with    Emesis     x2weeks    Knee Pain     Pain radiating from left knee down to left foot    Leg Swelling     left         Nursing Notes, Past Medical Hx, Past Surgical Hx, Social Hx, Allergies, and Family Hx were all reviewed and agreed with or any disagreements were addressed in the HPI.    HPI: (Location, Duration, Timing, Severity, Quality, Assoc Sx, Context, Modifying factors)    Chief Complaint of left knee pain    This is a  52 y.o. female with a history of lupus, fibromyalgia, anxiety and depression, chronic abdominal pain,  Jet-en-Y gastric bypass, septic joint in left knee, DVT in left leg (not on AC) presents with multiple complaints.  Patient complained of worsening left knee pain for a week.  Associated with left foot swelling.  Patient stated she visited orthopedics doctor a week ago who concerned about something abnormal in the left knee.  Patient decided to visit the ED today.  Patient also complaining of nausea and vomiting.  She has chronic abdominal pain.  She had abdomen CT on February 11 with no acute change.  Patient denied difference of pain comparing to last time.    PastMedical/Surgical History:      Diagnosis Date    Acid reflux     Anemia     Anxiety     Arthritis     Hands, Back And Ankles    Arthritis     Bleeding ulcer 2014    \"I Had Ulcers In My Stomach And Colon\"/ per pt on 8/12/2019\"they said recently having some blood in my stomach- in July ( 2019)could not find where coming from \"    Bronchitis Last Episode 2014    Chronic back pain     Chronic pain     [facility]    COPD (chronic obstructive pulmonary  disease) ([facility] [address]     Sees Dr. Sarika Li    Depression     Disease of blood and blood forming organ     Fibromyalgia Dx 2013    GERD (gastroesophageal reflux disease)     H/O echocardiogram 08/11/2015    EF >55%. LA to be at the upper limit of normal in size. LV hypertrophy with normal LV systolic, but abnormal diastolic function. Normal valvular structures and function. H/O echocardiogram 08/30/2018    EF 55-60%    Hiatal hernia     History of blood transfusion 09/2015 And 2018    No Reaction To Blood Transfusions Received    History of sleeve gastrectomy     [place] (hard of hearing)     Right Ear    Hx of cardiac catheterization 10/24/2010    Angiographically normal coronary arteries w/ normal LV function and wall motion. Hx of transesophageal echocardiography (PILO) for monitoring 12/02/2010    EF 55-60%. WNL.     HX OTHER MEDICAL     per old chart hx of sepsis and dx left 5th metatarsal MSSA osteomylitis- consult with Dr Mi Salvage     \"very difficulty IV stick- had mediport infection in the past- then put picc line in and removed 8/7/2019 now going to put new mediport in\"( 8/15/2019)    Hypertension     follow with Dr ? name    Hypertension     Immune deficiency disorder ([facility] [address]     Kidney cysts     \"they found that I have a kidney cyst but not sure which side\" per pt on 7/15/2020    Kidney stone     Lupus ([address] Dx 2013    \"Rheumatoid Lupus\"    MDRO (multiple drug resistant organisms) resistance     4 months ago chest from mediport    Morbid obesity ([address]     MRSA bacteremia     Nausea     \"Most Of The Time\"    Osteomyelitis of fifth toe of left foot (HCC)     Pain management     Sees Dr. Lamar Antonio, Once A Month    Pancreatitis chronic Dx 2001    Pneumonia Dx 11-15    Seizures ([address]     Shortness of breath on exertion     Shortness of breath on exertion     Sleep apnea     Has CPAP\"no longer use the cpap since lost weight\"    Staph infection Dx 11-15    Left Foot    Staph infection Dx 11-15 \"Left Foot\"    Teeth missing     Upper And Lower    Thyroid disease     Wears glasses     To Read         Procedure Laterality Date    ABDOMEN SURGERY      APPENDECTOMY  02/1998    Done When Tubes And Ovaries Were Removed    APPENDECTOMY      CARDIAC CATHETERIZATION  10/24/2010    CATHETER REMOVAL N/A 7/16/2019    PORT REMOVAL performed by Cristy Muñoz MD at [facility]  08/27/1991    CHOLECYSTECTOMY      CHOLECYSTECTOMY, LAPAROSCOPIC  1990's    COLONOSCOPY  Last Done In 2000's    DENTAL SURGERY      Teeth Extracted In Past    ENDOSCOPY, COLON, DIAGNOSTIC  Last Done In 2018    FOOT DEBRIDEMENT Left 6/16/2019    FIRST METATARSAL DEBRIDEMENT INCISION AND DRAINAGE. EXCISION OF ULCER.  RESECTION OF BONE. 1ST METATARSAL POWER LAVAGE AND BONE CEMENT performed by Garrick Valadez DPM at [address] [facility] Left 4/15/2022    LEFT FOOT ABSCESS DEBRIDEMENT INCISION AND DRAINAGE, CYST REMOVAL performed by Garrick Valadez DPM at [facility] Left Last Done In 2016     Dr. Shelly Pettit, \" About 6 Surgeries Done Because Of Staph Infection\"    HERNIA REPAIR      HIATAL HERNIA REPAIR N/A 2/12/2019    HERNIA HIATAL LAPAROSCOPIC ROBOTIC performed by Cristy Muñoz MD at [address] ([facility])  10/1997    Partial Abdominal Hysterectomy    HYSTERECTOMY (CERVIX STATUS UNKNOWN)      INSERTION / REMOVAL / REPLACEMENT VENOUS ACCESS CATHETER N/A 8/15/2019    PORT INSERTION performed by Cristy Muñoz MD at [place] [address]. ARTHROSCOPY Left 9/20/2022    LEFT KNEE ARTHROSCOPIC IRRIGATION AND DEBRIDEMENT WITH DRAIN PLACEMENT performed by Lucia Dietrich DO at [facility]  ~2000    removed after 6 months    LEG BIOPSY EXCISION Left 3/8/2021    LEFT THIGH LESION BIOPSY EXCISION performed by Cristy Muñoz MD at [address] [place] [address], PARTIAL Left 2008    Benign    OTHER SURGICAL HISTORY  02/1998    \"Tubes And Ovaries Removed, Appendectomy Also Done\"    OTHER SURGICAL HISTORY  Last Done 7-15-16    Mediport Insertion \"Total Of Six Done, Removed Last Mediport In 2014\"    PORT SURGERY N/A 1/15/2020    PORT REMOVAL performed by Cristy Muñoz MD at [address] N/A 7/6/2020    PORT INSERTION performed by Cristy Muñoz MD at [address] Left 8/3/2021    MEDIPORT REPLACEMENT performed by Cristy Muñoz MD at [facility] N/A 8/16/2021    GASTRIC BYPASS RUFINO-EN-Y LAPAROSCOPIC ROBOTIC, LYSIS OF ADHESIONS performed by Cristy Muñoz MD at [facility] N/A 2/12/2019    GASTRECTOMY SLEEVE LAPAROSCOPIC ROBOTIC performed by Cristy Muñoz MD at [address] [place] [address]  08/27/2018    UPPER GASTROINTESTINAL ENDOSCOPY N/A 4/2/2019    EGD CONTROL HEMORRHAGE with epi injection at bleeding site performed by Cristy Muñoz MD at [address] [place] [address] 6/19/2019    EGD DIAGNOSTIC ONLY performed by Cristy Muñoz MD at [address] [place] [address] N/A 7/22/2019    EGD DIAGNOSTIC ONLY performed by Tnaya Kunz MD at [address] [place] [address] N/A 10/14/2019    EGD DIAGNOSTIC ONLY performed by Cristy Muñoz MD at [address] [place] [address] 7/17/2020    EGJ DILATATION BALLOON WITH 18-20 MM BALLOON, DILATED TO 20 MM. performed by Cristy Muñoz MD at [address] [place] [address] 5/28/2021    EGD BIOPSY performed by Cristy Muñoz MD at [address] [place] [address]         Medications:  Previous Medications    ALBUTEROL (PROVENTIL) (2.5 MG/3ML) 0.083% NEBULIZER SOLUTION    Take 3 mLs by nebulization every 6 hours as needed for Wheezing    ALBUTEROL SULFATE HFA (PROVENTIL;VENTOLIN;PROAIR) 108 (90 BASE) MCG/ACT INHALER    Inhale 2 puffs into the lungs 4 times daily    BETAMETHASONE VALERATE (VALISONE) 0.1 % OINTMENT    APPLY OINTMENT TO AFFECTED AREA TWICE DAILY TO HANDS AND ELBOWS FOR 21 DAYS    CHOLECALCIFEROL (VITAMIN D3) 5000 UNITS TABS    Take 1 tablet by mouth daily    CYANOCOBALAMIN (VITAMIN B 12 PO)    Take 1 tablet by mouth daily    DICYCLOMINE (BENTYL) 20 MG TABLET    Take 1 tablet by mouth 4 times daily as needed (Abdominal pain)    ESTRADIOL (ESTRACE) 0.5 MG TABLET    Take 1 tablet by mouth daily    FERROUS SULFATE (IRON 325) 325 (65 FE) MG TABLET    Take 1 tablet by mouth 2 times daily    GABAPENTIN (NEURONTIN) 800 MG TABLET    Take 1 tablet by mouth 3 times daily for 4 days. HYDROXYZINE HCL (ATARAX) 50 MG TABLET    Take 1 tablet by mouth 2 times daily as needed for Itching    LEVETIRACETAM (KEPPRA) 1000 MG TABLET    Take 1 tablet by mouth twice daily    LEVOTHYROXINE (SYNTHROID) 125 MCG TABLET    Take 1 tablet by mouth Daily    MELATONIN 10 MG TABS    Take 10-20 mg by mouth nightly as needed    NARCAN 4 MG/0.1ML LIQD NASAL SPRAY    USE AS DIRECTED AS NEEDED FOR SUSPECTED OPIOID OVERDOSE    ONDANSETRON (ZOFRAN-ODT) 4 MG DISINTEGRATING TABLET    Take 1 tablet by mouth 3 times daily as needed for Nausea or Vomiting    PANTOPRAZOLE (PROTONIX) 40 MG TABLET    Take 1 tablet by mouth twice daily    PAROXETINE (PAXIL) 30 MG TABLET    TAKE 1 & 1/2 (ONE & ONE-HALF) TABLETS BY MOUTH NIGHTLY    PROMETHAZINE (PHENERGAN) 25 MG TABLET    Take 1 tablet by mouth every 8 hours as needed for Nausea    SCOPOLAMINE (TRANSDERM-SCOP) TRANSDERMAL PATCH    APPLY 1 PATCH TOPICALLY EVERY 72 HOURS AS NEEDED FOR NAUSEA FOR VOMITING    SUCRALFATE (CARAFATE) 1 GM/10ML SUSPENSION    Take 10 mLs by mouth 4 times daily for 21 days    TIZANIDINE (ZANAFLEX) 4 MG TABLET    Take 4 mg by mouth every 8 hours as needed          Review of Systems:  (2-9 systems needed)  Review of Systems   All other systems reviewed and are negative.     \"Positives and Pertinent negatives as per HPI\"    Physical Exam:  Physical Exam  HENT: Head: Normocephalic.      Nose: Nose normal.      Mouth/Throat:      Mouth: Mucous membranes are moist.   Eyes:      Pupils: Pupils are equal, round, and reactive to light.   Cardiovascular:      Rate and Rhythm: Normal rate and regular rhythm.      Pulses: Normal pulses.   Pulmonary:      Effort: Pulmonary effort is normal.   Abdominal:      General: Abdomen is flat.      Tenderness: There is abdominal tenderness.      Comments: Abdomen soft, mildly tender to palpation in the epigastric area   Musculoskeletal:         General: Swelling present.      Cervical back: Normal range of motion.      Comments: Left knee mild swelling, anterior aspect and posterior aspect are tender to palpation, left calf tender to palpation, left foot with chronic deformity from previous surgery, no obvious swelling noted, no neurovascular deficit in toes.   Skin:     General: Skin is warm.      Capillary Refill: Capillary refill takes less than 2 seconds.   Neurological:      General: No focal deficit present.      Mental Status: She is alert.   Psychiatric:         Mood and Affect: Mood normal.         Vitals:    Vitals:    02/23/23 1437 02/23/23 1530   BP: 130/75 (!) 163/66   Pulse: 91 87   Resp: 18    Temp: 98.3 °F (36.8 °C)    TempSrc: Oral    SpO2: 100% 100%   Weight: 234 lb (106.1 kg)    Height: 5' 4\" (1.626 m)        LABS:  Labs Reviewed   CBC WITH AUTO DIFFERENTIAL   COMPREHENSIVE METABOLIC PANEL   LACTIC ACID   HIGH SENSITIVITY CRP   SEDIMENTATION RATE        Remainder of labs reviewed and were negative at this time or not returned at the time of this note.    RADIOLOGY:   Non-plain film images such as CT, Ultrasound and MRI are read by the radiologist. IIgnacio APRN - CNP have directly visualized the radiologic plain film image(s) with the below findings:      Interpretation per the Radiologist below, if available at the time of this note:    XR KNEE LEFT (1-2 VIEWS)   Final Result   1. No acute findings in the left knee.  2. Minimal to mild tricompartmental osteoarthritic changes of the left knee   associated with small to moderate suprapatellar effusion. VL DUP LOWER EXTREMITY VENOUS LEFT    (Results Pending)        XR HAND LEFT (MIN 3 VIEWS)    Result Date: 2/18/2023  3 views of the left hand in a skeletally mature patient demonstrates no acute osseous abnormalities. Patient has minimal osteoarthritic changes underlying the soft tissue nodule seen in the dorsum of the index finger PIP joint. No additional osseous or soft tissue abnormalities noted. Alignment is appropriate throughout. XR KNEE LEFT (3 VIEWS)    Result Date: 2/18/2023  3 views of the left knee in a skeletally mature patient demonstrates no new osseous abnormalities. Overall alignment is neutral.  Mild medial joint space narrowing with osteophyte formation, unchanged from previous imaging. No large knee effusion. No new osseous or soft tissue abnormality noted. CT ABDOMEN PELVIS W IV CONTRAST Additional Contrast? None    Result Date: 2/11/2023  EXAMINATION: CT OF THE ABDOMEN AND PELVIS WITH CONTRAST 2/11/2023 2:30 am TECHNIQUE: CT of the abdomen and pelvis was performed with the administration of intravenous contrast. Multiplanar reformatted images are provided for review. Automated exposure control, iterative reconstruction, and/or weight based adjustment of the mA/kV was utilized to reduce the radiation dose to as low as reasonably achievable.  COMPARISON: 11/26/2022 HISTORY: ORDERING SYSTEM PROVIDED HISTORY: Upper abdominal pain with history of Jet en Y gastric bypass TECHNOLOGIST PROVIDED HISTORY: Reason for exam:->Upper abdominal pain with history of Jet en Y gastric bypass Additional Contrast?->None Decision Support Exception - unselect if not a suspected or confirmed emergency medical condition->Emergency Medical Condition (MA) Reason for Exam: Upper abdominal pain with history of Jet en Y gastric bypass FINDINGS: Lower Chest: Few calcified granulomas and some scattered atelectasis/scarring in both lower lungs. There is no pleural effusion. Organs: The degree of pneumobilia is increased compared with the prior exam. The central intrahepatic bile ducts and the common bile duct are dilated. But the overall appearance is similar to the previous exam.  Tiny hyperdensity at the pancreatic head appears to be outside the common bile duct and may be a small pancreatic calcification. Previous cholecystectomy has been performed. The spleen is borderline in size. No pancreatic ductal dilatation or surrounding fluid collection. May have a small calcification in the pancreatic head near the distal common bile duct. .  The adrenal glands are unremarkable. The kidneys are enhancing equally. There is a 1.3 cm area of low attenuation in the left kidney which appears to have some macroscopic fat suggesting a small angiomyolipoma. Nonobstructing small calculi in the lower pole of the left kidney. There is no hydronephrosis or hydroureter on either side. GI/Bowel: Jet-en-Y gastric bypass. The native gastric body is mostly collapsed. The gastric pouch and gastrojejunal junction may have a mild thickening from gastritis but is also under distended. The gastro jejunal limb is not dilated. Anastomotic site in the left mid abdomen is stable. The general small bowel and colon are not dilated. There is no ascites or pneumoperitoneum. Pelvis: Urinary bladder is not thickened and there is no abnormal fluid in the pelvis. Previous hysterectomy has been performed. Peritoneum/Retroperitoneum: No abdominal aortic aneurysm or retroperitoneal hematoma. Bones/Soft Tissues: Body wall appears stable. Disc disease and facet arthropathy in the lumbar spine. No acute fracture or bone destruction is identified. 1.  Jet-en-Y gastric bypass is redemonstrated. There could be some thickening and gastritis of the gastric pouch and gastrojejunal junction.  However the gastric pouch is also under distended. The gastro jejunal limb is not dilated. 2.  Pneumobilia is again noted. Dilated central intrahepatic tree and common bile duct are similar to the previous exam.  Tiny calcification at the pancreatic head appears to be outside the common bile duct and likely in the pancreatic head. 3.  No bowel obstruction, ascites, or pneumoperitoneum. MEDICAL DECISION MAKING / ED COURSE:    53-year-old female with history of lupus, fibromyalgia, anxiety and depression, chronic abdominal pain,  Jet-en-Y gastric bypass, septic joint in left knee, DVT in left leg (not on [facility]) presents with worsening left knee pain left leg pain, left foot pain, nausea and vomiting. Patient stated the left knee pain got worse for a week. She visited her orthopedics a week ago and had a left knee x-ray. Patient stated there is some concerning in left knee. Patient concerns of a blood clots in left leg. She is not taking any blood thinner. Patient has chronic abdominal pain and nausea and vomiting. Last time abdomen CT was done in February 11 (12 days ago). There is no acute change in abdomen CT. Patient stated there is no difference of symptoms comparing to last time. Labs ordered (CBC, CMP, CRP, ESR), x-ray of left knee and duplex of left leg ordered. Pending result. Transfer care to [facility] due to shift change. PROCEDURES:   Procedures    None    Patient was given:  Medications   ondansetron [facility]) injection 4 mg (4 mg IntraVENous Given 2/23/23 1805)   HYDROmorphone (DILAUDID) injection 0.5 mg (0.5 mg IntraVENous Given 2/23/23 1805)         The patient tolerated their visit well. I evaluated the patient. The physician was available for consultation as needed. The patient and / or the family were informed of the results of any tests, a time was given to answer questions, a plan was proposed and they agreed with plan. CLINICAL IMPRESSION:  1.  Chronic pain of left knee    2. Pain of upper abdomen    3. Nausea and vomiting, unspecified vomiting type        DISPOSITION        PATIENT REFERRED TO:  No follow-up provider specified.     DISCHARGE MEDICATIONS:  New Prescriptions    No medications on file       DISCONTINUED MEDICATIONS:  Discontinued Medications    No medications on file              (Please note the MDM and HPI sections of this note were completed with a voice recognition program.  Efforts were made to edit the dictations but occasionally words are mis-transcribed.)    Electronically signed, MICHAEL Lowe CNP,          MICHAEL Lowe CNP  02/23/23 2135

## 2023-02-24 NOTE — ED NOTES
Pt discharged from emergency department with all necessary paperwork and scripts. Discharge information reviewed and all questions answered.         Andreas Cavazos RN  02/23/23 1712

## 2023-02-27 ENCOUNTER — OFFICE VISIT (OUTPATIENT)
Dept: ORTHOPEDIC SURGERY | Age: 53
End: 2023-02-27
Payer: COMMERCIAL

## 2023-02-27 ENCOUNTER — HOSPITAL ENCOUNTER (OUTPATIENT)
Age: 53
Setting detail: SPECIMEN
Discharge: HOME | End: 2023-02-27
Payer: COMMERCIAL

## 2023-02-27 ENCOUNTER — TELEPHONE (OUTPATIENT)
Dept: ORTHOPEDIC SURGERY | Age: 53
End: 2023-02-27

## 2023-02-27 VITALS
HEART RATE: 72 BPM | OXYGEN SATURATION: 96 % | SYSTOLIC BLOOD PRESSURE: 147 MMHG | HEIGHT: 64 IN | WEIGHT: 236 LBS | DIASTOLIC BLOOD PRESSURE: 96 MMHG | BODY MASS INDEX: 40.29 KG/M2

## 2023-02-27 DIAGNOSIS — M79.89 PAIN AND SWELLING OF LEFT LOWER LEG: Primary | ICD-10-CM

## 2023-02-27 DIAGNOSIS — M79.662 PAIN AND SWELLING OF LEFT LOWER LEG: Primary | ICD-10-CM

## 2023-02-27 LAB
CRYSTALS, FLUID: NORMAL
FLUID TYPE: NORMAL INDEX
LYMPHOCYTES, BODY FLUID: 6 %
MESOTHELIAL FLUID: 0 /100 WBC
MONOCYTE, FLUID: 11 %
NEUTROPHIL, FLUID: 83 %
OTHER CELLS FLUID: 0
RBC FLUID: 4000 /CU MM
WBC FLUID: NORMAL /CU MM

## 2023-02-27 PROCEDURE — 87070 CULTURE OTHR SPECIMN AEROBIC: CPT

## 2023-02-27 PROCEDURE — 3017F COLORECTAL CA SCREEN DOC REV: CPT | Performed by: STUDENT IN AN ORGANIZED HEALTH CARE EDUCATION/TRAINING PROGRAM

## 2023-02-27 PROCEDURE — 87102 FUNGUS ISOLATION CULTURE: CPT

## 2023-02-27 PROCEDURE — 3080F DIAST BP >= 90 MM HG: CPT | Performed by: STUDENT IN AN ORGANIZED HEALTH CARE EDUCATION/TRAINING PROGRAM

## 2023-02-27 PROCEDURE — 99213 OFFICE O/P EST LOW 20 MIN: CPT | Performed by: STUDENT IN AN ORGANIZED HEALTH CARE EDUCATION/TRAINING PROGRAM

## 2023-02-27 PROCEDURE — 89051 BODY FLUID CELL COUNT: CPT

## 2023-02-27 PROCEDURE — 1036F TOBACCO NON-USER: CPT | Performed by: STUDENT IN AN ORGANIZED HEALTH CARE EDUCATION/TRAINING PROGRAM

## 2023-02-27 PROCEDURE — G8484 FLU IMMUNIZE NO ADMIN: HCPCS | Performed by: STUDENT IN AN ORGANIZED HEALTH CARE EDUCATION/TRAINING PROGRAM

## 2023-02-27 PROCEDURE — 87075 CULTR BACTERIA EXCEPT BLOOD: CPT

## 2023-02-27 PROCEDURE — G8417 CALC BMI ABV UP PARAM F/U: HCPCS | Performed by: STUDENT IN AN ORGANIZED HEALTH CARE EDUCATION/TRAINING PROGRAM

## 2023-02-27 PROCEDURE — 3077F SYST BP >= 140 MM HG: CPT | Performed by: STUDENT IN AN ORGANIZED HEALTH CARE EDUCATION/TRAINING PROGRAM

## 2023-02-27 PROCEDURE — 87205 SMEAR GRAM STAIN: CPT

## 2023-02-27 PROCEDURE — 89060 EXAM SYNOVIAL FLUID CRYSTALS: CPT

## 2023-02-27 PROCEDURE — G8427 DOCREV CUR MEDS BY ELIG CLIN: HCPCS | Performed by: STUDENT IN AN ORGANIZED HEALTH CARE EDUCATION/TRAINING PROGRAM

## 2023-02-27 RX ORDER — OXYCODONE HYDROCHLORIDE AND ACETAMINOPHEN 5; 325 MG/1; MG/1
TABLET ORAL
COMMUNITY
Start: 2023-02-01

## 2023-02-27 ASSESSMENT — ENCOUNTER SYMPTOMS
EYE PAIN: 0
BACK PAIN: 0
NAUSEA: 0
ABDOMINAL PAIN: 0
SHORTNESS OF BREATH: 0
COUGH: 0
WHEEZING: 0
EYE REDNESS: 0
PHOTOPHOBIA: 0
COLOR CHANGE: 0
VOMITING: 0
STRIDOR: 0
FACIAL SWELLING: 0

## 2023-02-27 NOTE — PROGRESS NOTES
2/27/2023   Chief Complaint   Patient presents with    Follow-up     Left knee pain and swelling        History of Present Illness:                             Maria Luisa Armas is a 45 y.o. female     Date of surgery: 9-     Procedure:  1. Diagnostic and operative arthroscopy of Left knee with irrigation and debridement  2. Placement of Left knee drain      History:  Ramos Peterson is here in follow up regarding their 5.5 month postop appointment for left knee procedure as discussed above    Patient is doing poorly. After being seen my office approximately 2 weeks ago, the patient went to the hospital the following day due to nausea and vomiting. She did was seen last week on Friday but emergency room due to continued knee pain. She continues with issues in regards to the left knee. She was concerned about swelling although does not appear to be significantly swollen this time. Ultrasound was performed by the emergency room which did not demonstrate a fluid collection suprapatellar pouch which was consistent essentially unchanged from a prior ultrasound of this area at a prior ER visit. They have increased 8/10 pain this time but states that it continues to fluctuate and she is having more and longer periods of no pain. They deny chest pain, SOB, calf pain,fever,wound drainage. No other issues. Patient denies any constitutional symptoms. She states her knee pain is overall improved. Patient states they have been compliant with restrictions. Patient has been ambulating without assistive device    Patient continues to get mild relief from ice and pain medication. With the patient, she has been resting with pillows underneath her knee and the knee bent position and has been having increasing pain in the posterior portion of her knee. Again, no new injury since last being seen. Patient's next visit with the rheumatologist is next month.     Patient states that the knee swelling is overall unchanged from last visit. Again, she denies any history of drug abuse. No additional infections other than the left knee infection that was treated with arthroscopic washout by myself 6 months prior as well as prior left foot injection from surgical infection. Again, no history of gout. Prior work-up was negative for crystals    Patient is currently not on any antibiotics. She has been off them for several months      Medical History  Patient's medications, allergies, past medical, surgical, social and family histories were reviewed and updated as appropriate. Past Medical History:   Diagnosis Date    Acid reflux     Anemia     Anxiety     Arthritis     Hands, Back And Ankles    Arthritis     Bleeding ulcer 2014    \"I Had Ulcers In My Stomach And Colon\"/ per pt on 8/12/2019\"they said recently having some blood in my stomach- in July ( 2019)could not find where coming from \"    Bronchitis Last Episode 2014    Chronic back pain     Chronic pain     [facility]    COPD (chronic obstructive pulmonary disease) ([facility] [address]     Sees Dr. Leisa Lo    Depression     Disease of blood and blood forming organ     Fibromyalgia Dx 2013    GERD (gastroesophageal reflux disease)     H/O echocardiogram 08/11/2015    EF >55%. LA to be at the upper limit of normal in size. LV hypertrophy with normal LV systolic, but abnormal diastolic function. Normal valvular structures and function. H/O echocardiogram 08/30/2018    EF 55-60%    Hiatal hernia     History of blood transfusion 09/2015 And 2018    No Reaction To Blood Transfusions Received    History of sleeve gastrectomy     [place] (hard of hearing)     Right Ear    Hx of cardiac catheterization 10/24/2010    Angiographically normal coronary arteries w/ normal LV function and wall motion. Hx of transesophageal echocardiography (PILO) for monitoring 12/02/2010    EF 55-60%. WNL.     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 name    Hypertension     Immune deficiency disorder (HCC)     Kidney cysts     \"they found that I have a kidney cyst but not sure which side\" per pt on 7/15/2020    Kidney stone     Lupus (HCC) Dx     \"Rheumatoid Lupus\"    MDRO (multiple drug resistant organisms) resistance     4 months ago chest from mediport    Morbid obesity (HCC)     MRSA bacteremia     Nausea     \"Most Of The Time\"    Osteomyelitis of fifth toe of left foot (HCC)     Pain management     Sees Dr. Patton, Once A Month    Pancreatitis chronic Dx     Pneumonia Dx -15    Seizures (HCC)     Shortness of breath on exertion     Shortness of breath on exertion     Sleep apnea     Has CPAP\"no longer use the cpap since lost weight\"    Staph infection Dx 11-15    Left Foot    Staph infection Dx 11-15    \"Left Foot\"    Teeth missing     Upper And Lower    Thyroid disease     Wears glasses     To Read     Past Surgical History:   Procedure Laterality Date    ABDOMEN SURGERY      APPENDECTOMY  1998    Done When Tubes And Ovaries Were Removed    APPENDECTOMY      CARDIAC CATHETERIZATION  10/24/2010    CATHETER REMOVAL N/A 2019    PORT REMOVAL performed by Lisbeth Scott MD at [facility] OR     SECTION  1991    CHOLECYSTECTOMY      CHOLECYSTECTOMY, LAPAROSCOPIC      COLONOSCOPY  Last Done In 's    DENTAL SURGERY      Teeth Extracted In Past    ENDOSCOPY, COLON, DIAGNOSTIC  Last Done In 2018    FOOT DEBRIDEMENT Left 2019    FIRST METATARSAL DEBRIDEMENT INCISION AND DRAINAGE. EXCISION OF ULCER. RESECTION OF BONE. 1ST METATARSAL POWER LAVAGE AND BONE CEMENT performed by Fly Doran DPM at [facility] OR    FOOT DEBRIDEMENT Left 4/15/2022    LEFT FOOT ABSCESS  DEBRIDEMENT INCISION AND DRAINAGE, CYST REMOVAL performed by Sher Zuñiga DPM at [address] [facility] Left Last Done In 2016     Dr. Fernando Graham, \" About 6 Surgeries Done Because Of Staph Infection\"    HERNIA REPAIR      HIATAL HERNIA REPAIR N/A 2/12/2019    HERNIA HIATAL LAPAROSCOPIC ROBOTIC performed by Tulio Castillo MD at [facility] [address])  10/1997    Partial Abdominal Hysterectomy    HYSTERECTOMY (CERVIX STATUS UNKNOWN)      INSERTION / REMOVAL / REPLACEMENT VENOUS ACCESS CATHETER N/A 8/15/2019    PORT INSERTION performed by Tulio Castillo MD at [place] [address]. ARTHROSCOPY Left 9/20/2022    LEFT KNEE ARTHROSCOPIC IRRIGATION AND DEBRIDEMENT WITH DRAIN PLACEMENT performed by Nikolai Bailey DO at [address] [facility]  ~2000    removed after 6 months    LEG BIOPSY EXCISION Left 3/8/2021    LEFT THIGH LESION BIOPSY EXCISION performed by Tulio Castillo MD at [address] [facility], PARTIAL Left 2008    Benign    OTHER SURGICAL HISTORY  02/1998    \"Tubes And Ovaries Removed, Appendectomy Also Done\"    OTHER SURGICAL HISTORY  Last Done 7-15-16    Mediport Insertion \"Total Of Six Done, Removed Last Mediport In 2014\"    PORT SURGERY N/A 1/15/2020    PORT REMOVAL performed by Tulio Castillo MD at [address] [facility] N/A 7/6/2020    PORT INSERTION performed by Tulio Castillo MD at [address] Left 8/3/2021    MEDIPORT REPLACEMENT performed by Tulio Castillo MD at [address] [facility] N/A 8/16/2021    GASTRIC BYPASS RUFINO-EN-Y LAPAROSCOPIC ROBOTIC, LYSIS OF ADHESIONS performed by Tulio Castillo MD at [facility] N/A 2/12/2019    GASTRECTOMY SLEEVE LAPAROSCOPIC ROBOTIC performed by Tulio Castillo MD at [facility]  08/27/2018    UPPER GASTROINTESTINAL ENDOSCOPY N/A 4/2/2019    EGD CONTROL HEMORRHAGE with epi injection at bleeding site performed by Aaron Meeks MD at [place] [address] N/A 6/19/2019    EGD DIAGNOSTIC ONLY performed by Aaron Meeks MD at [place] [address] N/A 7/22/2019    EGD DIAGNOSTIC ONLY performed by Wilmer Soto MD at [place] [address] N/A 10/14/2019    EGD DIAGNOSTIC ONLY performed by Aaron Meeks MD at [place] [address] N/A 7/17/2020    EGJ DILATATION BALLOON WITH 18-20 MM BALLOON, DILATED TO 20 MM. performed by Aaron Meeks MD at [place] [address] N/A 5/28/2021    EGD BIOPSY performed by Aaron Meeks MD at [facility]       Family History   Problem Relation Age of Onset    Diabetes Mother         \"Borderline Diabetes\"    High Blood Pressure Mother     Obesity Mother     Arthritis Mother     Heart Disease Mother     High Cholesterol Mother     Vision Loss Mother     Diabetes Father     High Blood Pressure Father     Asthma Father     Cancer Father         prostate cancer    High Blood Pressure Brother     Asthma Son     Vision Loss Son     Lupus Daughter     Other Daughter         \"Alot Of Female Problems\"     Social History     Socioeconomic History    Marital status:    Tobacco Use    Smoking status: Never    Smokeless tobacco: Never   Vaping Use    Vaping Use: Never used   Substance and Sexual Activity    Alcohol use: Never    Drug use: Never    Sexual activity: Not Currently   Social History Narrative    ** Merged History Encounter **          Current Outpatient Medications   Medication Sig Dispense Refill    oxyCODONE-acetaminophen (PERCOCET) 5-325 MG per tablet TAKE 1 TABLET BY MOUTH EVERY 4 TO 6 HOURS AS NEEDED FOR PAIN      promethazine (PHENERGAN) 25 MG tablet Take 1 tablet by mouth every 6 hours as needed for Nausea 20 tablet 0    scopolamine (TRANSDERM-SCOP) transdermal patch APPLY 1 PATCH TOPICALLY EVERY 72 HOURS AS NEEDED FOR NAUSEA FOR VOMITING 10 patch 0    hydrOXYzine HCl (ATARAX) 50 MG tablet Take 1 tablet by mouth 2 times daily as needed for Itching 30 tablet 0    ondansetron (ZOFRAN-ODT) 4 MG disintegrating tablet Take 1 tablet by mouth 3 times daily as needed for Nausea or Vomiting 30 tablet 1    PARoxetine (PAXIL) 30 MG tablet TAKE 1 & 1/2 (ONE & ONE-HALF) TABLETS BY MOUTH NIGHTLY 135 tablet 0    sucralfate (CARAFATE) 1 GM/10ML suspension Take 10 mLs by mouth 4 times daily for 21 days 840 mL 0    dicyclomine (BENTYL) 20 MG tablet Take 1 tablet by mouth 4 times daily as needed (Abdominal pain) 20 tablet 0    pantoprazole (PROTONIX) 40 MG tablet Take 1 tablet by mouth twice daily 180 tablet 0    estradiol (ESTRACE) 0.5 MG tablet Take 1 tablet by mouth daily 30 tablet 1    levETIRAcetam (KEPPRA) 1000 MG tablet Take 1 tablet by mouth twice daily 60 tablet 3    levothyroxine (SYNTHROID) 125 MCG tablet Take 1 tablet by mouth Daily 30 tablet 2    albuterol (PROVENTIL) (2.5 MG/3ML) 0.083% nebulizer solution Take 3 mLs by nebulization every 6 hours as needed for Wheezing 120 each 0    Melatonin 10 MG TABS Take 10-20 mg by mouth nightly as needed      tiZANidine (ZANAFLEX) 4 MG tablet Take 4 mg by mouth every 8 hours as needed       Cholecalciferol (VITAMIN D3) 5000 units TABS Take 1 tablet by mouth daily      gabapentin (NEURONTIN) 800 MG tablet Take 1 tablet by mouth 3 times daily for 4 days.  12 tablet 0    ferrous sulfate (IRON 325) 325 (65 Fe) MG tablet Take 1 tablet by mouth 2 times daily 180 tablet 1    albuterol sulfate HFA (PROVENTIL;VENTOLIN;PROAIR) 108 (90 Base) MCG/ACT inhaler Inhale 2 puffs into the lungs 4 times daily 1 each 3    NARCAN 4 MG/0.1ML LIQD nasal spray USE AS DIRECTED AS NEEDED FOR SUSPECTED OPIOID OVERDOSE      Cyanocobalamin (VITAMIN B 12 PO) Take 1 tablet by mouth daily      betamethasone valerate (VALISONE) 0.1 % ointment APPLY OINTMENT TO AFFECTED AREA TWICE DAILY TO HANDS AND ELBOWS FOR 21 DAYS       No current facility-administered medications for this visit. Allergies   Allergen Reactions    Aspirin Palpitations     \"My Heart Rate Elevates\"    Compazine [Prochlorperazine] Rash    Shellfish-Derived Products Swelling    Toradol [Ketorolac Tromethamine] Rash    Haldol [Haloperidol] Other (See Comments)     States \"shook severely and had to have Benadryl\"    Asa [Aspirin]     Compazine [Prochlorperazine]     Haldol [Haloperidol]      Shaking      Reglan [Metoclopramide] Itching    Reglan [Metoclopramide]     Toradol [Ketorolac Tromethamine]          Review of Systems   Constitutional:  Negative for activity change, appetite change, chills, diaphoresis, fatigue, fever and unexpected weight change. HENT:  Negative for congestion, ear pain, facial swelling, hearing loss and sneezing. Eyes:  Negative for photophobia, pain and redness. Respiratory:  Negative for cough, shortness of breath, wheezing and stridor. Cardiovascular:  Negative for chest pain, palpitations and leg swelling. Gastrointestinal:  Negative for abdominal pain, nausea and vomiting. Endocrine: Negative for cold intolerance and heat intolerance. Musculoskeletal:  Positive for arthralgias. Negative for back pain, gait problem, joint swelling, myalgias, neck pain and neck stiffness. Skin:  Negative for color change, pallor, rash and wound. Neurological:  Negative for dizziness, facial asymmetry, weakness and numbness. Examination:  General Exam:  Vitals: BP (!) 147/96   Pulse 72   Ht 5' 4\" (1.626 m)   Wt 236 lb (107 kg)   SpO2 96%   BMI 40.51 kg/m²    Physical Exam  Constitutional:       General: She is not in acute distress. Appearance: Normal appearance. She is obese. She is not ill-appearing. HENT:      Head: Normocephalic and atraumatic. Eyes:      General:         Right eye: No discharge. Left eye: No discharge. Extraocular Movements: Extraocular movements intact. Cardiovascular:      Pulses: Normal pulses. Pulmonary:      Effort: Pulmonary effort is normal.      Breath sounds: Normal breath sounds. Musculoskeletal:         General: Swelling and tenderness present. No signs of injury. Right shoulder: Normal.      Left shoulder: Normal.      Right upper arm: Normal.      Left upper arm: Normal.      Right elbow: Normal.      Left elbow: Normal.      Right forearm: Normal.      Left forearm: Normal.      Right wrist: Normal.      Left wrist: Normal.      Right hand: Normal.      Left hand: Swelling and tenderness present. No deformity, lacerations or bony tenderness. Decreased range of motion. Normal strength. Normal sensation. There is no disruption of two-point discrimination. Normal capillary refill. Normal pulse. Cervical back: Normal range of motion. Right hip: Normal.      Left hip: Normal.      Right upper leg: Normal.      Left upper leg: Normal.      Left knee: Bony tenderness and crepitus present. No swelling, deformity, effusion, erythema, ecchymosis or lacerations. Normal range of motion. Tenderness present over the medial joint line. No lateral joint line tenderness. No LCL laxity, MCL laxity, ACL laxity or PCL laxity. Normal alignment, normal meniscus and normal patellar mobility. Normal pulse. Instability Tests: Anterior drawer test negative. Posterior drawer test negative. Anterior Lachman test negative. Right lower leg: Normal. No edema. Left lower leg: Normal. No edema. Right ankle: Normal.      Left ankle: Normal.      Right foot: Normal.      Left foot: Normal.   Skin:     General: Skin is warm and dry. Capillary Refill: Capillary refill takes less than 2 seconds. Neurological:      General: No focal deficit present. Mental Status: She is alert and oriented to person, place, and time. Mental status is at baseline.       Gait: Gait normal.   Psychiatric: Mood and Affect: Mood normal.         Behavior: Behavior normal.        LEFT KNEE EXAMINATION       OBSERVATION / INSPECTION     Gait: Mildly antalgic     Alignment: Neutral     Scars: Well-healed previous arthroscopic incisions    Muscle atrophy: Minimal quad    Effusion: Minimal effusion    Warmth: None     Discoloration: none       TENDERNESS / CREPITUS (T / C): Patella - / -     Lateral joint line - / -  Medial joint line  + / +     Peripatellar lateral - / -  Peripatellar medial  - / -     Medial plica - / -  Lateral plica - / -    Patellar tendon - / -   Prepatellar Bursa - / -     Popliteal fossa - / -    Gastrocnemius - / -    Quadricep - / -   Quad tendon - / -      Tibial tubercle - / -     Thigh/hamstring - / -  Pes anserine/HS - / -     ITB - / -     Tibia - / -   Fibula - / -    Tib/fib joint - / -     MFC - / -    LFC - / -     MCL: Proximal - / -  Distal - / -    LCL - / -    MCL - / -      ROM: (* = pain)  PASSIVE  ACTIVE     Left :    0 / 115  0 / 105      Right :    0 / 125  0 / 125      PATELLOFEMORAL EXAMINATION:    See above noted areas of tenderness. Patella position     Subluxation / dislocation: Centered     Sup. / Inf; Normal     Crepitus (PF): Absent     Patellar Mobility:     Medial-lateral: Normal     Superior-inferior: Normal     Inferior tilt Normal     Patellar tendon: Normal     Lateral tilt: Normal     J-sign: None     Patellofemoral grind: Mild pain         MENISCAL SIGNS:     Pain on terminal extension: -    Pain on terminal flexion: -    Squat: Not tested      LIGAMENT EXAMINATION:    ACL / Lachman: normal (-1 to 2mm)      PCL-Post.  drawer: normal 0 to 2mm    MCL- Valgus: normal 0 to 2mm    LCL- Varus:  normal 0 to 2mm        STRENGTH: (* = with pain) PAINFUL SIDE    Quadricep 5/5    Hamstrin/5      EXTREMITY NEURO-VASCULAR EXAMINATION:     Sensation:  Grossly intact to light touch all dermatomal regions.      Motor Function:  Fully intact motor function at hip, knee, foot and ankle      DTRs;  quadriceps and  achilles 2+. No clonus and downgoing Babinski. Vascular status:  DP and PT pulses 2+, brisk capillary refill, symmetric. Patient demonstrates appropriate mood and affect. Left lower extremity exam:   The incisions are well-healed clean, dry, intact, and nontender with no erythema. There is no drainage. They have no edema, the Arm compartments are soft . There are No cords or calf tenderness. No significant calf/ankle edema. They are neurovascularly intact distally. Range of motion of the right knee demonstrates mild pain with gentle range of motion    Mild global tenderness palpation    Negative Petar's    Mild knee effusion. No other concerning signs of infection including erythema, warmth, wound/laceration, puncture wounds, rash   -No pain with range of motion of the knee, hip or ankle. Patient wiggles toes without difficulty   -Good distal pulses with good capillary refill   -sensation intact to light touch   -Skin intact with no laceration. BILAT HAND EXAM:    OBSERVATION / INSPECTION:     Swelling: Positive nodule over dorsum of PIP joint of index. Patient now has a similar nodule over the right hand DIP joint of the index finger. No erythema. Nodule appears to be mobile in nature.   Unchanged in size from previous imaging    Deformity: Nodules as described above    Discoloration: none     Scars: none     Atrophy: none      TENDERNESS / CREPITUS (T / C):      1st Dorsal compartment -/-    FCR -/-    FCU -/-    Ulnar Styloid -/-    Radial Styloid -/-    Palm -/-    Metacarpals -/-    Thumb CMC -/-     Thumb MCP -/-    Lesser MCPs -/-    PIP -/-    DIP -/-      Range of motion: ('*' = with pain)       Left hand    Full extension of fingers, full flexion to the palm of all fingers        Right Elbow     AROM (PROM)     Extension 0 deg  (5 deg)     Flexion 145 deg (145 deg)        Pronation 90 deg  (90 deg)     Supination 80 deg  (80 deg)                Left Elbow     AROM (PROM)     Extension 0 deg  (5 deg)     Flexion 145 deg (145 deg)        Pronation 90 deg  (90 deg)     Supination 80 deg  (80 deg)          Right Wrist    Extension 80 deg (85 deg)     Flexion 80 deg (85 deg)        Ulnar Deviation  35 deg (40 deg)    Radial Deviation 35 deg (40 deg)             Left Wrist     AROM (PROM)     Extension 80 deg (85 deg)     Flexion 80 deg (85 deg)        Ulnar Deviation  35 deg (40 deg)    Radial Deviation 35 deg (40 deg)            STRENGTH: ('*' = with pain)     Elbow Flexion: 5/5    Elbow Extension: 5/5    Wrist Flexion: 5/5    Wrist Extension: /5    :     Intrinsics:     EPL (Extensor pollicis longus):     Pinch Mechanism:       EXTREMITY NEURO-VASCULAR EXAMINATION: Sensation grossly intact to light touch all dermatomal regions. DTR 2+ Biceps, Triceps, BR and Negative Gabi's sign. Grossly intact motor function at Elbow, Wrist and Hand. Distal pulses radial and ulnar 2+, brisk cap refill, symmetric. Diagnostic testin views of the left knee from emergency room demonstrate:  1. No acute findings in the left knee. 2. Minimal to mild tricompartmental osteoarthritic changes of the left knee   associated with small to moderate suprapatellar effusion. Ultrasound left lower extremity from emergency room demonstrate: The visualized veins of the left lower extremity are patent and free of   echogenic thrombus. The veins demonstrate good compressibility with normal   color flow study and spectral analysis. Heterogeneous collection of material in the suprapatellar region measuring   approximately 2 6 x 6.67 cm. No internal flow. This is not significantly   changed from 2022. Previous Imaging:  3 views of the left knee in a skeletally mature patient demonstrates no new osseous abnormalities.   Overall alignment is neutral.  Mild medial joint space narrowing with osteophyte formation, unchanged from previous imaging. No large knee effusion. No new osseous or soft tissue abnormality noted. 3 views of the left hand in a skeletally mature patient demonstrates no acute osseous abnormalities. Patient has minimal osteoarthritic changes underlying the soft tissue nodule seen in the dorsum of the index finger PIP joint. No additional osseous or soft tissue abnormalities noted. Alignment is appropriate throughout. Office Procedures:  No orders of the defined types were placed in this encounter. PROCEDURE:  Left Suprolateral patetellar Aspiration Procedure Note   Pre-operative Diagnosis: Left knee effusion, low concern for septic joint   Post-operative Diagnosis: same   Indications: Rule out septic joint   Anesthesia: Lidocaine 1%    Procedure Details   Verbal consent was obtained for the procedure. The knee was prepped with alcohol scrub. A 18 gauge needle was advanced into the superolateral aspect of the knee into the superopatellar area without difficulty The space was then aspirated without difficulty and a total of 20 cc of clear, nonturbulent colored fluid was removed from the knee. A bandaid and compressive soft dressing was applied. Complications: None; patient tolerated the procedure well. Symptoms were improved immediately after the aspiration. Assessment and Plan    A: Left knee pain, low concern for infection, likely noninfectious and rheumatologic in nature    P:   I had a thorough conversation with patient regarding her current physical exam findings and treatment course. I explained that the fluid in the suprapatellar portion of the knee does not appear to be concerning at this time although she remains very concerned about it. I explained that we could attempt an aspiration which provide some pain relief but would mostly provide reassurance that there is no infection and we have brought through testing to verify this. Patient would like to proceed with this.   A knee aspiration was performed out complication sure her fluid taken off of the was very appropriate appearing and did not show any signs concerning for infection but we will continue to send it for full laboratory work-up. We will call her with the results. I explained that if her cell count is elevated to the point discussed surgery I may discuss her case directly with Dr. Murali Graf from infectious disease as her knee effusion may be more related to rheumatologic issues. If her knee is nonconcerning for any type of true infection, she will maintain her rheumatology appointment as I think this is more that baseline issue that is causing her knee pain as well as her bilateral hand nodules. Again, I will discuss this as needed and we will call her with results once they are posted. Until then, patient can continue normal activities but I would refrain from any excessive weightbearing including any exercise and with keep the leg iced and elevated. I also counseled her against resting knee in a flexed position as she has created a flexion contracture of the knee she voiced understanding. Aspiration of the left knee was performed at bedside without complication and left knee synovial fluid was sent for gram stain, culture/sensitivity, cell count, crystals, and fungal work-up.   All questions were answered and patient voiced their understanding    Electronically signed by Nora Toledo DO on 2/27/2023 at 11:46 AM

## 2023-02-27 NOTE — PROGRESS NOTES
Patient comes in today due to increase pain and swelling. She was last seen in our office on 2/15/23. She was seen in the ED on 2/23/23. X-rays were taken and an ultrasound was completed at that time. She states that she has been using a compression wrap and taking Percocet per pain doctor. She rate her pain at 5-6/10. She denies any new falls or injuries. Ultrasound impression  Impression   1. No evidence of DVT in the left lower extremity. 2. Nonspecific heterogeneous collection of material in the left suprapatellar   region without internal flow and without significant change from 11/26/2022. Consider further evaluation with nonemergent MRI.

## 2023-02-27 NOTE — TELEPHONE ENCOUNTER
I was able to contact the patient with the results of her cell count and let her know that her numbers are normal she is not infected. She will keep her follow-up appointment and will follow-up with rheumatologist as scheduled. She is going to try to call their office to see if they can move up her appointment.

## 2023-02-28 LAB
FUNGUS STAIN: NORMAL
Lab: NORMAL
SPECIMEN: NORMAL

## 2023-03-02 ENCOUNTER — TELEPHONE (OUTPATIENT)
Dept: ORTHOPEDIC SURGERY | Age: 53
End: 2023-03-02

## 2023-03-02 NOTE — TELEPHONE ENCOUNTER
Patient called and stated that she was seen in office on 2-27, and is still having lots of pain in her knee. She also stated that the swelling has already returned. She is wanting to get advise on what to do.please advise.  [phone]

## 2023-03-04 LAB
CULTURE: NORMAL
Lab: NORMAL
SPECIMEN: NORMAL

## 2023-03-04 NOTE — PROGRESS NOTES
[facility]  consulted by Dr. Mega Crump for monitoring and adjustment. Indication for treatment: CRBSI- MRSA   Goal trough: 15 mcg/mL  Other Antimicrobials:  None     Pertinent Laboratory Values:   Temp Readings from Last 3 Encounters:   01/22/20 98.9 °F (37.2 °C) (Oral)   10/31/19 98.1 °F (36.7 °C) (Oral)   10/16/19 97.8 °F (36.6 °C) (Oral)     Recent Labs     01/21/20  0305 01/21/20  1109 01/22/20  0600   WBC 4.1 5.2 5.6     Recent Labs     01/20/20  0732 01/21/20  1109 01/22/20  0600   BUN 12 12 12   CREATININE 0.7 0.8 0.7     Estimated Creatinine Clearance: 124 mL/min (based on SCr of 0.7 mg/dL). No intake or output data in the 24 hours ending 01/22/20 2037    Pertinent Cultures:  Date                             Source                                     Results  1/10                             Blood                                       NGTD  1/13                             Blood   (2 of 2)                         MRSA  1/15                             Medi-Port                                 MRSA  1/15                             Surgical Cx                              MRSA  1/16                             Blood                                       NGTD    Vancomycin level:   TROUGH:    Recent Labs     01/21/20  1109 01/22/20  1816   VANCOTROUGH 20.0 21.2*     RANDOM:  No results for input(s): VANCORANDOM in the last 72 hours. Assessment:  WBC and temperature: No leukocytosis with 24-hour TMax 98.9F  SCr, BUN, and urine output: Stable  Day(s) of therapy: Day #5  Vancomycin level: Therapeutic (trough tough expected to be <20mg/dL)    Plan:  Dosing comments: Vancomycin concentration returned at 21.2mg/dL; this level was collected <5-hours after dose. Suspect true trough will be therapeutic <20mg/dL  Renal function is stable with no clinical signes of vancomycin toxicity. Will continue current regimen.    Pharmacy will continue to monitor patient and adjust therapy as indicated    Thank you for the consult.   Emigdio Hyman RP  1/22/2020 8:37 PM no

## 2023-03-06 ENCOUNTER — OFFICE VISIT (OUTPATIENT)
Dept: RHEUMATOLOGY | Age: 53
End: 2023-03-06
Payer: COMMERCIAL

## 2023-03-06 VITALS
HEART RATE: 74 BPM | OXYGEN SATURATION: 97 % | DIASTOLIC BLOOD PRESSURE: 80 MMHG | BODY MASS INDEX: 41.2 KG/M2 | WEIGHT: 240 LBS | SYSTOLIC BLOOD PRESSURE: 120 MMHG

## 2023-03-06 DIAGNOSIS — R89.4 SEROLOGIC ABNORMALITY: ICD-10-CM

## 2023-03-06 DIAGNOSIS — Z79.52 CURRENT CHRONIC USE OF SYSTEMIC STEROIDS: ICD-10-CM

## 2023-03-06 DIAGNOSIS — M05.79 RHEUMATOID ARTHRITIS INVOLVING MULTIPLE SITES WITH POSITIVE RHEUMATOID FACTOR (HCC): Primary | ICD-10-CM

## 2023-03-06 DIAGNOSIS — Z01.89 ENCOUNTER FOR OTHER SPECIFIED SPECIAL EXAMINATIONS: ICD-10-CM

## 2023-03-06 PROCEDURE — 3017F COLORECTAL CA SCREEN DOC REV: CPT | Performed by: STUDENT IN AN ORGANIZED HEALTH CARE EDUCATION/TRAINING PROGRAM

## 2023-03-06 PROCEDURE — 3074F SYST BP LT 130 MM HG: CPT | Performed by: STUDENT IN AN ORGANIZED HEALTH CARE EDUCATION/TRAINING PROGRAM

## 2023-03-06 PROCEDURE — G8427 DOCREV CUR MEDS BY ELIG CLIN: HCPCS | Performed by: STUDENT IN AN ORGANIZED HEALTH CARE EDUCATION/TRAINING PROGRAM

## 2023-03-06 PROCEDURE — 1036F TOBACCO NON-USER: CPT | Performed by: STUDENT IN AN ORGANIZED HEALTH CARE EDUCATION/TRAINING PROGRAM

## 2023-03-06 PROCEDURE — G8484 FLU IMMUNIZE NO ADMIN: HCPCS | Performed by: STUDENT IN AN ORGANIZED HEALTH CARE EDUCATION/TRAINING PROGRAM

## 2023-03-06 PROCEDURE — G8417 CALC BMI ABV UP PARAM F/U: HCPCS | Performed by: STUDENT IN AN ORGANIZED HEALTH CARE EDUCATION/TRAINING PROGRAM

## 2023-03-06 PROCEDURE — 3079F DIAST BP 80-89 MM HG: CPT | Performed by: STUDENT IN AN ORGANIZED HEALTH CARE EDUCATION/TRAINING PROGRAM

## 2023-03-06 PROCEDURE — 99205 OFFICE O/P NEW HI 60 MIN: CPT | Performed by: STUDENT IN AN ORGANIZED HEALTH CARE EDUCATION/TRAINING PROGRAM

## 2023-03-06 RX ORDER — SULFASALAZINE 500 MG/1
500 TABLET ORAL 2 TIMES DAILY
Qty: 60 TABLET | Refills: 0 | Status: SHIPPED | OUTPATIENT
Start: 2023-03-06

## 2023-03-06 RX ORDER — PREDNISONE 1 MG/1
TABLET ORAL
Qty: 70 TABLET | Refills: 0 | Status: SHIPPED | OUTPATIENT
Start: 2023-03-06

## 2023-03-06 NOTE — PROGRESS NOTES
RHEUMATOLOGY NEW PATIENT VISIT    3/6/2023      Patient Name: Colin Cadet  : 1970  Medical Record: [id number]      CHIEF COMPLAINT  Chronic pain of the left knee  Seropositive RA, RF +, CCP+   Normocytic anemia  Elevated CRP    Pertinent Problems  Remote history of lupus  Hx of psoriasis  GERD w/hx of erosive gastritis status post 4 units PRBC in 2018  ANDRADE  Chronic pancreatitis  Hypothyroidism  COPD  Chronic pain syndrome  MRSA bacteremia in 2600  Alfonzo Boogie is a 45 y.o. female who was referred for above concerns. Symptom onset began in 10/2022 for left knee swelling that was attributed to sepsis. Seen by ID on 10/19/2022 patient treated with ceftriaxone for 6 weeks course, still have persistent left knee pain that was attributed to a non-septic process. W/u showed that she had positive RF. In the past 2 weeks, started having nodules and hand swelling. She informed lupus in [place], since then she has not been treated. There is a.m stiffness, difficulty to make a fist and to open her hands lasting the whole day   Flares: Denies  Swelling: yes   Improved with: prednisone 10 mg once 2 days ago that helped her pain   Worse with: nothing   Associated symptoms: chills+ , nodules+, pso +, dry eyes+  denies fever, sob, chronic cough   Difficulty with ADLs: yes,  is poor   Pain level today: 5-6/10   Was treated for sepsis in 10/2022 s/p prolonged course of IV ceftriaxone x 6 weeks  Functional status: on disability. Other rheumatologic symptoms :  Denies chest pain, SOB, fever, rash, oral/nasal ulcers, change in mental status, sicca symptoms, Muscle pain, muscle weakness, raynaud's, puffy fingers or skin thickening. History of recurrent miscarriages, blood clots, dactylitis, uveitis, enthesitis.     Interventions so far:   Current meds:   Risk factors: Smoking, obesity+ s/p weight loss surgery, no recreational drug use, Mother has psoriasis+       Current rheum meds:     Past rheum meds:     No flowsheet data found.        REVIEW OF SYSTEMS     Constitutional:  Denies fever or chills, decreased appetite, or weight loss   Eyes:  Denies change in visual acuity or eye dryness or irritation  HENT:  Denies dry mouth or oral ulcers  Respiratory:  Denies cough or shortness of breath   Cardiovascular:  Denies chest pain or edema   GI:  Denies abdominal pain, nausea, vomiting, bloody stools or diarrhea   :  Denies dysuria or hematuria  Musculoskeletal:  See HPI  Integument: Pso +   Neurologic:  Denies headache, focal weakness or sensory changes   Endocrine:  Denies polyuria or polydipsia   Lymphatic:  Denies swollen glands   Psychiatric:  Denies depression or anxiety       PROBLEM LIST    Patient Active Problem List   Diagnosis    Fever    Fibromyalgia    Lupus (systemic lupus erythematosus) (HCC)    Chronic pancreatitis (HCC)    HTN (hypertension)    Frequent UTI    Gastroesophageal reflux disease without esophagitis    Chronic depression    Fatty liver disease, nonalcoholic    Arthritis    Bilateral low back pain with left-sided sciatica    Hiatal hernia    Chronic pain syndrome    Drug-seeking/Aberrant behavior    Receiving intravenous antibiotic treatment as outpatient    Lymph node disorder    Morbid obesity with BMI of 40.0-44.9, adult ([facility])    Iron deficiency anemia    History of Jet-en-Y gastric bypass    Anxiety    RUQ pain    Discoloration of skin of flank resembling ecchymosis    Chest pain of uncertain etiology    Cellulitis of left thigh    Malabsorption    COPD (chronic obstructive pulmonary disease) (HCC)    Nausea    Hypothyroidism due to acquired atrophy of thyroid    Vitamin D deficiency    Irritable bowel syndrome    Malabsorption due to intolerance, not elsewhere classified    Port-A-Cath in place    Pseudoseizures (HCC)    Diarrhea    Myalgia    Acute bilateral deep vein thrombosis (DVT) of femoral veins (HCC)    Acute deep vein thrombosis (DVT) of  popliteal vein of left lower extremity (HCC)    Swelling of joint of left knee    Septic arthritis of knee, left (HCC)    Hemarthrosis of left knee    Ileus (HCC)    Acute pain of left knee    Hemarthrosis involving knee joint, left    Epigastric pain    S/P arthroscopic surgery of left knee       MEDICATIONS    Current Outpatient Medications   Medication Sig Dispense Refill    predniSONE (DELTASONE) 5 MG tablet Take 3 tabs daily for one week then 2 tabs daily for 2 weeks then 1.5 tabs daily for 2 weeks 70 tablet 0    sulfaSALAzine (AZULFIDINE) 500 MG tablet Take 1 tablet by mouth 2 times daily 60 tablet 0    oxyCODONE-acetaminophen (PERCOCET) 5-325 MG per tablet TAKE 1 TABLET BY MOUTH EVERY 4 TO 6 HOURS AS NEEDED FOR PAIN      scopolamine (TRANSDERM-SCOP) transdermal patch APPLY 1 PATCH TOPICALLY EVERY 72 HOURS AS NEEDED FOR NAUSEA FOR VOMITING 10 patch 0    hydrOXYzine HCl (ATARAX) 50 MG tablet Take 1 tablet by mouth 2 times daily as needed for Itching 30 tablet 0    PARoxetine (PAXIL) 30 MG tablet TAKE 1 & 1/2 (ONE & ONE-HALF) TABLETS BY MOUTH NIGHTLY 135 tablet 0    pantoprazole (PROTONIX) 40 MG tablet Take 1 tablet by mouth twice daily 180 tablet 0    estradiol (ESTRACE) 0.5 MG tablet Take 1 tablet by mouth daily 30 tablet 1    gabapentin (NEURONTIN) 800 MG tablet Take 1 tablet by mouth 3 times daily for 4 days.  12 tablet 0    levETIRAcetam (KEPPRA) 1000 MG tablet Take 1 tablet by mouth twice daily 60 tablet 3    levothyroxine (SYNTHROID) 125 MCG tablet Take 1 tablet by mouth Daily 30 tablet 2    Melatonin 10 MG TABS Take 10-20 mg by mouth nightly as needed      tiZANidine (ZANAFLEX) 4 MG tablet Take 4 mg by mouth every 8 hours as needed       Cholecalciferol (VITAMIN D3) 5000 units TABS Take 1 tablet by mouth daily      ondansetron (ZOFRAN-ODT) 4 MG disintegrating tablet Take 1 tablet by mouth 3 times daily as needed for Nausea or Vomiting 30 tablet 1    sucralfate (CARAFATE) 1 GM/10ML suspension Take 10 mLs by mouth 4 times daily for 21 days (Patient not taking: Reported on 3/6/2023) 840 mL 0    dicyclomine (BENTYL) 20 MG tablet Take 1 tablet by mouth 4 times daily as needed (Abdominal pain) (Patient not taking: Reported on 3/6/2023) 20 tablet 0    albuterol (PROVENTIL) (2.5 MG/3ML) 0.083% nebulizer solution Take 3 mLs by nebulization every 6 hours as needed for Wheezing (Patient not taking: Reported on 3/6/2023) 120 each 0     No current facility-administered medications for this visit. ALLERGIES    Allergies   Allergen Reactions    Aspirin Palpitations     \"My Heart Rate Elevates\"    Compazine [Prochlorperazine] Rash    Shellfish-Derived Products Swelling    Toradol [Ketorolac Tromethamine] Rash    Haldol [Haloperidol] Other (See Comments)     States \"shook severely and had to have Benadryl\"    Asa [Aspirin]     Compazine [Prochlorperazine]     Haldol [Haloperidol]      Shaking      Reglan [Metoclopramide] Itching    Reglan [Metoclopramide]     Toradol [Ketorolac Tromethamine]        PAST MEDICAL HISTORY      Past Medical History:   Diagnosis Date    Acid reflux     Anemia     Anxiety     Arthritis     Hands, Back And Ankles    Arthritis     Bleeding ulcer 2014    \"I Had Ulcers In My Stomach And Colon\"/ per pt on 8/12/2019\"they said recently having some blood in my stomach- in July ( 2019)could not find where coming from \"    Bronchitis Last Episode 2014    Chronic back pain     Chronic pain     [facility]    COPD (chronic obstructive pulmonary disease) ([facility] [address]     Sees Dr. Sharri Mckinnon    Depression     Disease of blood and blood forming organ     Fibromyalgia Dx 2013    GERD (gastroesophageal reflux disease)     H/O echocardiogram 08/11/2015    EF >55%. LA to be at the upper limit of normal in size. LV hypertrophy with normal LV systolic, but abnormal diastolic function. Normal valvular structures and function.      H/O echocardiogram 08/30/2018    EF 55-60%    Hiatal hernia     History of blood transfusion 09/2015 And 2018    No Reaction To Blood Transfusions Received    History of sleeve gastrectomy     [place] (hard of hearing)     Right Ear    Hx of cardiac catheterization 10/24/2010    Angiographically normal coronary arteries w/ normal LV function and wall motion. Hx of transesophageal echocardiography (PILO) for monitoring 12/02/2010    EF 55-60%. WNL.     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 name    Hypertension     Immune deficiency disorder ([address]     Kidney cysts     \"they found that I have a kidney cyst but not sure which side\" per pt on 7/15/2020    Kidney stone     Lupus ([address] Dx 2013    \"Rheumatoid Lupus\"    MDRO (multiple drug resistant organisms) resistance     4 months ago chest from mediport    Morbid obesity ([address]     MRSA bacteremia     Nausea     \"Most Of The Time\"    Osteomyelitis of fifth toe of left foot (HCC)     Pain management     Sees Dr. Lizzy Brock, Once A Month    Pancreatitis chronic Dx 2001    Pneumonia Dx 11-15    Seizures (HCC)     Shortness of breath on exertion     Shortness of breath on exertion     Sleep apnea     Has CPAP\"no longer use the cpap since lost weight\"    Staph infection Dx 11-15    Left Foot    Staph infection Dx 11-15    \"Left Foot\"    Teeth missing     Upper And Lower    Thyroid disease     Wears glasses     To Read         SOCIAL HISTORY     Social History     Socioeconomic History    Marital status:    Tobacco Use    Smoking status: Never    Smokeless tobacco: Never   Vaping Use    Vaping Use: Never used   Substance and Sexual Activity    Alcohol use: Never    Drug use: Never    Sexual activity: Not Currently   Social History Narrative    ** Merged History Encounter **              FAMILY HISTORY     Family History   Problem Relation Age of Onset    Diabetes Mother         \"Borderline Diabetes\"    High Blood Pressure Mother     Obesity Mother     Arthritis Mother     Heart Disease Mother     High Cholesterol Mother     Vision Loss Mother     Diabetes Father     High Blood Pressure Father     Asthma Father     Cancer Father         prostate cancer    High Blood Pressure Brother     Asthma Son     Vision Loss Son     Lupus Daughter     Other Daughter         \"Alot Of Female Problems\"         PHYSICAL EXAM     Wt Readings from Last 3 Encounters:   03/06/23 240 lb (108.9 kg)   02/27/23 236 lb (107 kg)   02/23/23 234 lb (106.1 kg)     Temp Readings from Last 3 Encounters:   02/23/23 98.3 °F (36.8 °C) (Oral)   02/16/23 98.7 °F (37.1 °C)   02/11/23 98.3 °F (36.8 °C) (Oral)     BP Readings from Last 3 Encounters:   03/06/23 120/80   02/27/23 (!) 147/96   02/23/23 125/65     Pulse Readings from Last 3 Encounters:   03/06/23 74   02/27/23 72   02/23/23 70       General appearance:  Alert and oriented, NAD, well developed   HEENT: EOMI, no scleral injection, moist mucous membranes, no oral ulcers, normal hearing, no cartilage inflammation  Neck: Trachea midline, no masses  Lymph: no LAD  Lungs: CTAB, no rales  Heart: regular rate and rhythm, S1, S2 normal, no murmur, no lower extremity edema  Abdomen: Soft, ND, NT. + BS. Extremities: atraumatic, no cyanosis or edema. Neurologic: CN 2-12 grossly intact. Skin: Bilateral elbow psoriasis+, warm and dry, no telangiectasias, no digital pitting, no sclerodactyly, no rheumatoid nodules, no livedo  MSK:   HANDS: MCP, PIP synovitis, PIP nodules,  good ROM,   Elbow: No synovitis, good ROM,   Shoulder:good ROM,   Knee: Bilateral knee pain, L> R  Ankle:good ROM,   FEET: Positive forefoot squeeze test, MTP synovitis    Spine:  no tender points, no obvious deformities.     Neuro:  Alert & oriented x 3, normal motor function, normal sensory function, no focal deficits noted . Muscle strength: 4/4 in bilateral upper and lower extremities. Psychiatric: Mood and affect are appropriate, recent and remote memory normal,                    LABS AND IMAGING  Available labs were reviewed and discussed with patient   2/23/2023  WBC normal  Hemoglobin 10.4, L MCV 82, normal  Platelets normal  CXR normal  Ferritin normal  CRP 43.1, H  ESR normal  Albumin normal    12/1/2022  RF 58, H  , H  KOBY negative  Anti-dsDNA negative  Anti-RNP negative  IgE phosphatidyl panel negative  PM-SCL antibody negative  SSA negative  C3 normal  C4 normal    Assessment   Patient is a 64-year-old female with history of psoriasis and septic arthritis being referred to rheumatology clinic for seropositive RA, RF positive and CCP positive. ALP is noted to be high, will avoid MTX at this time. She is allergic to NSAIDs and aspirin but is not aware of any sulfa allergy. We will start sulfasalazine and prednisone today. We will see in 4 weeks    1. Rheumatoid arthritis involving multiple sites with positive rheumatoid factor (HCC)  CDAI = TJC 22, SJC 17, PtGA 5, PGA 5= 49 ( high severity of RA)   - predniSONE (DELTASONE) 5 MG tablet; Take 3 tabs daily for one week then 2 tabs daily for 2 weeks then 1.5 tabs daily for 2 weeks  Dispense: 70 tablet; Refill: 0  - Quantiferon, Incubated; Future  - Uric Acid; Future  - Hepatitis Panel, Acute; Future  - Cyclic Citrul Peptide Antibody, IgG; Future  - C-Reactive Protein; Future  - Sedimentation Rate; Future  - Renal Function Panel; Future  - CBC; Future  - Vitamin D 25 Hydroxy; Future  - DEXA BONE DENSITY AXIAL SKELETON; Future  - Hepatic Function Panel; Future  - sulfaSALAzine (AZULFIDINE) 500 MG tablet; Take 1 tablet by mouth 2 times daily  Dispense: 60 tablet; Refill: 0    2. Serologic abnormality  - Quantiferon, Incubated; Future  - Uric Acid; Future  - Hepatitis Panel, Acute;  Future  - Cyclic Citrul Peptide Antibody, IgG; Future  - C-Reactive Protein; Future  - Sedimentation Rate; Future  - Renal Function Panel; Future  - CBC; Future  - Vitamin D 25 Hydroxy; Future  - DEXA BONE DENSITY AXIAL SKELETON; Future  - Hepatic Function Panel; Future    3. Encounter for other specified special examinations  - Hepatitis Panel, Acute; Future    4. Current chronic use of systemic steroids  - DEXA BONE DENSITY AXIAL SKELETON; Future    5. Body mass index (BMI) 40.0-44.9, adult (HCC)   - Vitamin D 25 Hydroxy; Future       Patient Instructions  Complete ordered labs   Start Prednisone 15mg daily for 1 weeks then 10 mg daily 2 weeks, then 7.5 mg daily for  2 weeks   Start sulfasalazine one tablet  twice daily   We will discuss results at next visit  RTC in 4 weeks       -  The patient indicates understanding of these issues and agrees with the plan.     I spent  60 minutes on the date of service, preparing to see the patient (eg, review of tests), obtaining and/or reviewing separately obtained history, counseling and educating the family/caregiver, ordering medications, tests, or procedures, referring and communicating with other health care professionals, documenting clinical information in the electronic or other health record, care coordination (not separately reported)      August Saucedo MD

## 2023-03-06 NOTE — PATIENT INSTRUCTIONS
Complete ordered labs   Start Prednisone 15mg daily for 1 weeks then 10 mg daily 2 weeks, then 7.5 mg daily for  2 weeks   Start sulfasalazine one tablet  twice daily   You were referred for bone scan please schedule   We will discuss results at next visit  RTC in 4 weeks

## 2023-03-07 ENCOUNTER — HOSPITAL ENCOUNTER (OUTPATIENT)
Dept: WOMENS IMAGING | Age: 53
Discharge: HOME | End: 2023-03-07
Payer: COMMERCIAL

## 2023-03-07 ENCOUNTER — HOSPITAL ENCOUNTER (OUTPATIENT)
Age: 53
Discharge: HOME | End: 2023-03-07
Payer: COMMERCIAL

## 2023-03-07 DIAGNOSIS — R89.4 SEROLOGIC ABNORMALITY: ICD-10-CM

## 2023-03-07 DIAGNOSIS — Z79.52 CURRENT CHRONIC USE OF SYSTEMIC STEROIDS: ICD-10-CM

## 2023-03-07 DIAGNOSIS — M05.79 RHEUMATOID ARTHRITIS INVOLVING MULTIPLE SITES WITH POSITIVE RHEUMATOID FACTOR (HCC): ICD-10-CM

## 2023-03-07 DIAGNOSIS — Z01.89 ENCOUNTER FOR OTHER SPECIFIED SPECIAL EXAMINATIONS: ICD-10-CM

## 2023-03-07 LAB
25(OH)D3 SERPL-MCNC: 33.48 NG/ML
ALBUMIN SERPL-MCNC: 4.1 GM/DL (ref 3.4–5)
ALBUMIN SERPL-MCNC: 4.1 GM/DL (ref 3.4–5)
ALP BLD-CCNC: 127 IU/L (ref 40–129)
ALT SERPL-CCNC: 11 U/L (ref 10–40)
ANION GAP SERPL CALCULATED.3IONS-SCNC: 13 MMOL/L (ref 4–16)
AST SERPL-CCNC: 13 IU/L (ref 15–37)
BILIRUB SERPL-MCNC: 0.1 MG/DL (ref 0–1)
BILIRUBIN DIRECT: 0.2 MG/DL (ref 0–0.3)
BILIRUBIN, INDIRECT: ABNORMAL MG/DL (ref 0–0.7)
BUN SERPL-MCNC: 13 MG/DL (ref 6–23)
CALCIUM SERPL-MCNC: 10 MG/DL (ref 8.3–10.6)
CHLORIDE BLD-SCNC: 105 MMOL/L (ref 99–110)
CO2: 23 MMOL/L (ref 21–32)
CREAT SERPL-MCNC: 0.6 MG/DL (ref 0.6–1.1)
CRP SERPL HS-MCNC: 51.3 MG/L
ERYTHROCYTE SEDIMENTATION RATE: 26 MM/HR (ref 0–30)
GFR SERPL CREATININE-BSD FRML MDRD: >60 ML/MIN/1.73M2
GLUCOSE SERPL-MCNC: 109 MG/DL (ref 70–99)
HAV IGM SERPL QL IA: NON REACTIVE
HBV CORE IGM SERPL QL IA: NON REACTIVE
HBV SURFACE AG SERPL QL IA: NON REACTIVE
HCT VFR BLD CALC: 34.9 % (ref 37–47)
HCV AB SERPL QL IA: NON REACTIVE
HEMOGLOBIN: 10.6 GM/DL (ref 12.5–16)
MCH RBC QN AUTO: 25.8 PG (ref 27–31)
MCHC RBC AUTO-ENTMCNC: 30.4 % (ref 32–36)
MCV RBC AUTO: 84.9 FL (ref 78–100)
PDW BLD-RTO: 13.5 % (ref 11.7–14.9)
PHOSPHORUS: 2.6 MG/DL (ref 2.5–4.9)
PLATELET # BLD: 325 K/CU MM (ref 140–440)
PMV BLD AUTO: 9.9 FL (ref 7.5–11.1)
POTASSIUM SERPL-SCNC: 4.6 MMOL/L (ref 3.5–5.1)
RBC # BLD: 4.11 M/CU MM (ref 4.2–5.4)
SODIUM BLD-SCNC: 141 MMOL/L (ref 135–145)
TOTAL PROTEIN: 6.6 GM/DL (ref 6.4–8.2)
URATE SERPL-MCNC: 3.9 MG/DL (ref 2.6–6)
WBC # BLD: 5.3 K/CU MM (ref 4–10.5)

## 2023-03-07 PROCEDURE — 82248 BILIRUBIN DIRECT: CPT

## 2023-03-07 PROCEDURE — 86140 C-REACTIVE PROTEIN: CPT

## 2023-03-07 PROCEDURE — 80074 ACUTE HEPATITIS PANEL: CPT

## 2023-03-07 PROCEDURE — 36415 COLL VENOUS BLD VENIPUNCTURE: CPT

## 2023-03-07 PROCEDURE — 82306 VITAMIN D 25 HYDROXY: CPT

## 2023-03-07 PROCEDURE — 77080 DXA BONE DENSITY AXIAL: CPT

## 2023-03-07 PROCEDURE — 85652 RBC SED RATE AUTOMATED: CPT

## 2023-03-07 PROCEDURE — 84550 ASSAY OF BLOOD/URIC ACID: CPT

## 2023-03-07 PROCEDURE — 84100 ASSAY OF PHOSPHORUS: CPT

## 2023-03-07 PROCEDURE — 86200 CCP ANTIBODY: CPT

## 2023-03-07 PROCEDURE — 80053 COMPREHEN METABOLIC PANEL: CPT

## 2023-03-07 PROCEDURE — 85027 COMPLETE CBC AUTOMATED: CPT

## 2023-03-07 PROCEDURE — 86480 TB TEST CELL IMMUN MEASURE: CPT

## 2023-03-09 LAB — CCP IGG SERPL-ACNC: >250 UNITS (ref 0–19)

## 2023-03-10 LAB
QUANTI TB1 MINUS NIL: 0.16 IU/ML (ref 0–0.34)
QUANTI TB2 MINUS NIL: 0.1 IU/ML (ref 0–0.34)
QUANTIFERON (R) TB GOLD (INCUBATED): NEGATIVE IU/ML
QUANTIFERON MITOGEN MINUS NIL: >10 IU/ML
QUANTIFERON NIL: 0.03 IU/ML

## 2023-03-11 DIAGNOSIS — R11.0 NAUSEA: ICD-10-CM

## 2023-03-13 DIAGNOSIS — E03.4 HYPOTHYROIDISM DUE TO ACQUIRED ATROPHY OF THYROID: ICD-10-CM

## 2023-03-13 DIAGNOSIS — N95.1 MENOPAUSAL VASOMOTOR SYNDROME: ICD-10-CM

## 2023-03-13 RX ORDER — ONDANSETRON 4 MG/1
TABLET, ORALLY DISINTEGRATING ORAL
Qty: 30 TABLET | Refills: 0 | OUTPATIENT
Start: 2023-03-13

## 2023-03-13 RX ORDER — HYDROXYZINE 50 MG/1
50 TABLET, FILM COATED ORAL 2 TIMES DAILY PRN
Qty: 30 TABLET | Refills: 0 | Status: SHIPPED | OUTPATIENT
Start: 2023-03-13 | End: 2023-03-27 | Stop reason: SDUPTHER

## 2023-03-13 RX ORDER — LEVOTHYROXINE SODIUM 0.12 MG/1
125 TABLET ORAL DAILY
Qty: 30 TABLET | Refills: 2 | Status: SHIPPED | OUTPATIENT
Start: 2023-03-13

## 2023-03-13 RX ORDER — ESTRADIOL 0.5 MG/1
0.5 TABLET ORAL DAILY
Qty: 30 TABLET | Refills: 1 | Status: SHIPPED | OUTPATIENT
Start: 2023-03-13 | End: 2023-03-27 | Stop reason: SDUPTHER

## 2023-03-15 ENCOUNTER — TELEPHONE (OUTPATIENT)
Dept: RHEUMATOLOGY | Age: 53
End: 2023-03-15

## 2023-03-15 NOTE — TELEPHONE ENCOUNTER
Patient called in and is stating since taking the azulfidine and prednisone she has been experiencing heaviness in her chest. She sees her primary care tomorrow and hasn't gone to the ER. Patient is stating she will see how she feels today and if it doesn't get better she will go to the er, otherwise she will see her pcp tomorrow.

## 2023-03-16 DIAGNOSIS — R11.0 NAUSEA: ICD-10-CM

## 2023-03-16 RX ORDER — ONDANSETRON 4 MG/1
4 TABLET, ORALLY DISINTEGRATING ORAL 3 TIMES DAILY PRN
Qty: 30 TABLET | Refills: 1 | Status: SHIPPED | OUTPATIENT
Start: 2023-03-16

## 2023-03-16 NOTE — TELEPHONE ENCOUNTER
----- Message from Amandanaz Leonardo sent at 3/15/2023  4:08 PM EDT -----  Subject: Refill Request    QUESTIONS  Name of Medication? ondansetron (ZOFRAN-ODT) 4 MG disintegrating tablet  Patient-reported dosage and instructions? once or twice a day  How many days do you have left? 0  Preferred Pharmacy? [id number]  Pharmacy phone number (if available)? [phone]  ---------------------------------------------------------------------------  --------------  CALL BACK INFO  What is the best way for the office to contact you? OK to leave message on   voicemail  Preferred Call Back Phone Number? [phone]  ---------------------------------------------------------------------------  --------------  SCRIPT ANSWERS  Relationship to Patient?  Self

## 2023-03-20 ENCOUNTER — TELEPHONE (OUTPATIENT)
Dept: INTERNAL MEDICINE CLINIC | Age: 53
End: 2023-03-20

## 2023-03-20 LAB
CULTURE: NORMAL
CULTURE: NORMAL
FUNGUS STAIN: NORMAL
Lab: NORMAL
Lab: NORMAL
SPECIMEN: NORMAL
SPECIMEN: NORMAL

## 2023-03-27 ENCOUNTER — OFFICE VISIT (OUTPATIENT)
Dept: INTERNAL MEDICINE CLINIC | Age: 53
End: 2023-03-27
Payer: COMMERCIAL

## 2023-03-27 VITALS
DIASTOLIC BLOOD PRESSURE: 82 MMHG | OXYGEN SATURATION: 96 % | SYSTOLIC BLOOD PRESSURE: 138 MMHG | BODY MASS INDEX: 41.37 KG/M2 | WEIGHT: 241 LBS | HEART RATE: 69 BPM

## 2023-03-27 DIAGNOSIS — K04.7 DENTAL ABSCESS: Primary | ICD-10-CM

## 2023-03-27 DIAGNOSIS — R11.0 NAUSEA: ICD-10-CM

## 2023-03-27 DIAGNOSIS — N95.1 MENOPAUSAL VASOMOTOR SYNDROME: ICD-10-CM

## 2023-03-27 DIAGNOSIS — F41.9 ANXIETY: ICD-10-CM

## 2023-03-27 DIAGNOSIS — F32.A CHRONIC DEPRESSION: ICD-10-CM

## 2023-03-27 LAB
CULTURE: NORMAL
FUNGUS STAIN: NORMAL
Lab: NORMAL
SPECIMEN: NORMAL

## 2023-03-27 PROCEDURE — 3017F COLORECTAL CA SCREEN DOC REV: CPT

## 2023-03-27 PROCEDURE — 99214 OFFICE O/P EST MOD 30 MIN: CPT

## 2023-03-27 PROCEDURE — G8417 CALC BMI ABV UP PARAM F/U: HCPCS

## 2023-03-27 PROCEDURE — G8484 FLU IMMUNIZE NO ADMIN: HCPCS

## 2023-03-27 PROCEDURE — G8427 DOCREV CUR MEDS BY ELIG CLIN: HCPCS

## 2023-03-27 PROCEDURE — 3075F SYST BP GE 130 - 139MM HG: CPT

## 2023-03-27 PROCEDURE — 3079F DIAST BP 80-89 MM HG: CPT

## 2023-03-27 PROCEDURE — 1036F TOBACCO NON-USER: CPT

## 2023-03-27 RX ORDER — AMOXICILLIN AND CLAVULANATE POTASSIUM 875; 125 MG/1; MG/1
1 TABLET, FILM COATED ORAL 2 TIMES DAILY
Qty: 20 TABLET | Refills: 0 | Status: ON HOLD | OUTPATIENT
Start: 2023-03-27 | End: 2023-04-06

## 2023-03-27 RX ORDER — AMOXICILLIN 500 MG/1
CAPSULE ORAL
Status: ON HOLD | COMMUNITY
Start: 2023-03-23

## 2023-03-27 RX ORDER — HYDROXYZINE 50 MG/1
50 TABLET, FILM COATED ORAL 2 TIMES DAILY PRN
Qty: 30 TABLET | Refills: 0 | Status: ON HOLD | OUTPATIENT
Start: 2023-03-27

## 2023-03-27 RX ORDER — SCOLOPAMINE TRANSDERMAL SYSTEM 1 MG/1
PATCH, EXTENDED RELEASE TRANSDERMAL
Qty: 10 PATCH | Refills: 0 | Status: ON HOLD | OUTPATIENT
Start: 2023-03-27

## 2023-03-27 RX ORDER — PAROXETINE 30 MG/1
TABLET, FILM COATED ORAL
Qty: 135 TABLET | Refills: 0 | Status: SHIPPED | OUTPATIENT
Start: 2023-03-27 | End: 2023-03-27 | Stop reason: ALTCHOICE

## 2023-03-27 RX ORDER — PROMETHAZINE HYDROCHLORIDE 25 MG/1
25 TABLET ORAL 3 TIMES DAILY PRN
Qty: 12 TABLET | Refills: 0 | Status: ON HOLD | OUTPATIENT
Start: 2023-03-27 | End: 2023-04-03

## 2023-03-27 RX ORDER — DULOXETIN HYDROCHLORIDE 30 MG/1
30 CAPSULE, DELAYED RELEASE ORAL DAILY
Qty: 7 CAPSULE | Refills: 0 | Status: ON HOLD | OUTPATIENT
Start: 2023-03-27 | End: 2023-04-03

## 2023-03-27 RX ORDER — ESTRADIOL 0.5 MG/1
0.5 TABLET ORAL DAILY
Qty: 30 TABLET | Refills: 1 | Status: ON HOLD | OUTPATIENT
Start: 2023-03-27

## 2023-03-27 RX ORDER — DULOXETIN HYDROCHLORIDE 60 MG/1
60 CAPSULE, DELAYED RELEASE ORAL DAILY
Qty: 30 CAPSULE | Refills: 0 | Status: ON HOLD | OUTPATIENT
Start: 2023-03-27

## 2023-03-27 SDOH — ECONOMIC STABILITY: FOOD INSECURITY: WITHIN THE PAST 12 MONTHS, THE FOOD YOU BOUGHT JUST DIDN'T LAST AND YOU DIDN'T HAVE MONEY TO GET MORE.: NEVER TRUE

## 2023-03-27 SDOH — ECONOMIC STABILITY: INCOME INSECURITY: HOW HARD IS IT FOR YOU TO PAY FOR THE VERY BASICS LIKE FOOD, HOUSING, MEDICAL CARE, AND HEATING?: NOT HARD AT ALL

## 2023-03-27 SDOH — ECONOMIC STABILITY: FOOD INSECURITY: WITHIN THE PAST 12 MONTHS, YOU WORRIED THAT YOUR FOOD WOULD RUN OUT BEFORE YOU GOT MONEY TO BUY MORE.: NEVER TRUE

## 2023-03-27 SDOH — ECONOMIC STABILITY: HOUSING INSECURITY
IN THE LAST 12 MONTHS, WAS THERE A TIME WHEN YOU DID NOT HAVE A STEADY PLACE TO SLEEP OR SLEPT IN A SHELTER (INCLUDING NOW)?: NO

## 2023-03-27 ASSESSMENT — ENCOUNTER SYMPTOMS
VOMITING: 0
CONSTIPATION: 0
NAUSEA: 1
DIARRHEA: 0
ABDOMINAL PAIN: 0
WHEEZING: 0
EYE PAIN: 0
EYE REDNESS: 0
CHEST TIGHTNESS: 0
COUGH: 0
TROUBLE SWALLOWING: 0
FACIAL SWELLING: 0
CHOKING: 0
COLOR CHANGE: 0
SORE THROAT: 0
STRIDOR: 0
RHINORRHEA: 0
EYE DISCHARGE: 0
SHORTNESS OF BREATH: 0

## 2023-03-27 ASSESSMENT — VISUAL ACUITY: OU: 1

## 2023-03-27 NOTE — PROGRESS NOTES
put picc line in and removed 8/7/2019 now going to put new mediport in\"( 8/15/2019)    Hypertension     follow with Dr ? name    Hypertension     Immune deficiency disorder [facility])     Kidney cysts     \"they found that I have a kidney cyst but not sure which side\" per pt on 7/15/2020    Kidney stone     Lupus ([address] Dx 2013    \"Rheumatoid Lupus\"    MDRO (multiple drug resistant organisms) resistance     4 months ago chest from mediport    Morbid obesity ([address]     MRSA bacteremia     Nausea     \"Most Of The Time\"    Osteomyelitis of fifth toe of left foot (HCC)     Pain management     Sees Dr. Eleonora Pinzon, Once A Month    Pancreatitis chronic Dx 2001    Pneumonia Dx 11-15    Seizures ([address]     Shortness of breath on exertion     Shortness of breath on exertion     Sleep apnea     Has CPAP\"no longer use the cpap since lost weight\"    Staph infection Dx 11-15    Left Foot    Staph infection Dx 11-15    \"Left Foot\"    Teeth missing     Upper And Lower    Thyroid disease     Wears glasses     To Read      Past Surgical History:   Procedure Laterality Date    ABDOMEN SURGERY      APPENDECTOMY  02/1998    Done When Tubes And Ovaries Were Removed    APPENDECTOMY      CARDIAC CATHETERIZATION  10/24/2010    CATHETER REMOVAL N/A 7/16/2019    PORT REMOVAL performed by Jocelyn Orellana MD at [facility]  08/27/1991    CHOLECYSTECTOMY      CHOLECYSTECTOMY, LAPAROSCOPIC  1990's    COLONOSCOPY  Last Done In 2000's    DENTAL SURGERY      Teeth Extracted In Past    ENDOSCOPY, COLON, DIAGNOSTIC  Last Done In 2018    FOOT DEBRIDEMENT Left 6/16/2019    FIRST METATARSAL DEBRIDEMENT INCISION AND DRAINAGE. EXCISION OF ULCER.  RESECTION OF BONE. 1ST METATARSAL POWER LAVAGE AND BONE CEMENT performed by Tabitha Benitez DPM at [address] Left 4/15/2022    LEFT FOOT ABSCESS DEBRIDEMENT INCISION AND DRAINAGE, CYST REMOVAL performed by Tabitha Benitez DPM at [facility] Left Last Done In 2016

## 2023-03-27 NOTE — PATIENT INSTRUCTIONS
We are committed to providing you the best care possible. If you receive a survey after visiting one of our offices, please take time to share your experience concerning your physician office visit. These surveys are confidential and no health information about you is shared. We are eager to improve for you and we are counting on your feedback to help make that happen. Paxil 30 mg for 4 nights, 15 mg for 4 nights. Start Cymbalta 30 mg daily for 1 week. Then increase Cymbalta to 60 mg daily for 1 month.

## 2023-03-30 ENCOUNTER — TELEPHONE (OUTPATIENT)
Dept: ORTHOPEDIC SURGERY | Age: 53
End: 2023-03-30

## 2023-04-01 ENCOUNTER — HOSPITAL ENCOUNTER (OUTPATIENT)
Age: 53
Setting detail: OBSERVATION
Discharge: HOME | End: 2023-04-03
Attending: EMERGENCY MEDICINE
Payer: COMMERCIAL

## 2023-04-01 ENCOUNTER — APPOINTMENT (OUTPATIENT)
Dept: GENERAL RADIOLOGY | Age: 53
End: 2023-04-01
Payer: COMMERCIAL

## 2023-04-01 ENCOUNTER — APPOINTMENT (OUTPATIENT)
Dept: CT IMAGING | Age: 53
End: 2023-04-01
Payer: COMMERCIAL

## 2023-04-01 ENCOUNTER — APPOINTMENT (OUTPATIENT)
Dept: ULTRASOUND IMAGING | Age: 53
End: 2023-04-01
Payer: COMMERCIAL

## 2023-04-01 DIAGNOSIS — R11.0 NAUSEA: ICD-10-CM

## 2023-04-01 DIAGNOSIS — R11.2 NAUSEA AND VOMITING, UNSPECIFIED VOMITING TYPE: ICD-10-CM

## 2023-04-01 DIAGNOSIS — R10.10 PAIN OF UPPER ABDOMEN: Primary | ICD-10-CM

## 2023-04-01 DIAGNOSIS — M25.562 ACUTE PAIN OF LEFT KNEE: ICD-10-CM

## 2023-04-01 DIAGNOSIS — R53.1 LEFT-SIDED WEAKNESS: ICD-10-CM

## 2023-04-01 PROBLEM — R10.9 ABDOMINAL PAIN: Status: ACTIVE | Noted: 2023-04-01

## 2023-04-01 LAB
ALBUMIN SERPL-MCNC: 3.8 GM/DL (ref 3.4–5)
ALP BLD-CCNC: 117 IU/L (ref 40–129)
ALT SERPL-CCNC: 12 U/L (ref 10–40)
AMYLASE: 24 U/L (ref 25–115)
ANION GAP SERPL CALCULATED.3IONS-SCNC: 8 MMOL/L (ref 4–16)
AST SERPL-CCNC: 13 IU/L (ref 15–37)
BACTERIA: NEGATIVE /HPF
BASOPHILS ABSOLUTE: 0 K/CU MM
BASOPHILS RELATIVE PERCENT: 0.9 % (ref 0–1)
BILIRUB SERPL-MCNC: 0.1 MG/DL (ref 0–1)
BILIRUBIN URINE: NEGATIVE MG/DL
BLOOD, URINE: NEGATIVE
BUN SERPL-MCNC: 14 MG/DL (ref 6–23)
CALCIUM SERPL-MCNC: 9.4 MG/DL (ref 8.3–10.6)
CHLORIDE BLD-SCNC: 107 MMOL/L (ref 99–110)
CHP ED QC CHECK: YES
CLARITY: CLEAR
CO2: 25 MMOL/L (ref 21–32)
COLOR: YELLOW
CREAT SERPL-MCNC: 0.6 MG/DL (ref 0.6–1.1)
DIFFERENTIAL TYPE: ABNORMAL
EKG ATRIAL RATE: 78 BPM
EKG DIAGNOSIS: NORMAL
EKG P AXIS: 41 DEGREES
EKG P-R INTERVAL: 170 MS
EKG Q-T INTERVAL: 370 MS
EKG QRS DURATION: 86 MS
EKG QTC CALCULATION (BAZETT): 421 MS
EKG R AXIS: -25 DEGREES
EKG T AXIS: 43 DEGREES
EKG VENTRICULAR RATE: 78 BPM
EOSINOPHILS ABSOLUTE: 0.2 K/CU MM
EOSINOPHILS RELATIVE PERCENT: 3.7 % (ref 0–3)
GFR SERPL CREATININE-BSD FRML MDRD: >60 ML/MIN/1.73M2
GLUCOSE BLD-MCNC: 101 MG/DL
GLUCOSE BLD-MCNC: 101 MG/DL (ref 70–99)
GLUCOSE SERPL-MCNC: 105 MG/DL (ref 70–99)
GLUCOSE, URINE: NEGATIVE MG/DL
HCG QUALITATIVE: NORMAL
HCT VFR BLD CALC: 32.4 % (ref 37–47)
HEMOGLOBIN: 9.9 GM/DL (ref 12.5–16)
IMMATURE NEUTROPHIL %: 0.2 % (ref 0–0.43)
INR BLD: 0.84 INDEX
KETONES, URINE: NEGATIVE MG/DL
LEUKOCYTE ESTERASE, URINE: ABNORMAL
LIPASE: 12 IU/L (ref 13–60)
LIPASE: 17 IU/L (ref 13–60)
LYMPHOCYTES ABSOLUTE: 1.5 K/CU MM
LYMPHOCYTES RELATIVE PERCENT: 35.5 % (ref 24–44)
MCH RBC QN AUTO: 26.1 PG (ref 27–31)
MCHC RBC AUTO-ENTMCNC: 30.6 % (ref 32–36)
MCV RBC AUTO: 85.3 FL (ref 78–100)
MONOCYTES ABSOLUTE: 0.4 K/CU MM
MONOCYTES RELATIVE PERCENT: 8.1 % (ref 0–4)
MUCUS: ABNORMAL HPF
NITRITE URINE, QUANTITATIVE: NEGATIVE
NUCLEATED RBC %: 0 %
PDW BLD-RTO: 14.1 % (ref 11.7–14.9)
PH, URINE: 6 (ref 5–8)
PLATELET # BLD: 206 K/CU MM (ref 140–440)
PMV BLD AUTO: 10.1 FL (ref 7.5–11.1)
POTASSIUM SERPL-SCNC: 4.2 MMOL/L (ref 3.5–5.1)
PROTEIN UA: NEGATIVE MG/DL
PROTHROMBIN TIME: 10.7 SECONDS (ref 11.7–14.5)
RBC # BLD: 3.8 M/CU MM (ref 4.2–5.4)
RBC URINE: ABNORMAL /HPF (ref 0–6)
SEGMENTED NEUTROPHILS ABSOLUTE COUNT: 2.2 K/CU MM
SEGMENTED NEUTROPHILS RELATIVE PERCENT: 51.6 % (ref 36–66)
SODIUM BLD-SCNC: 140 MMOL/L (ref 135–145)
SPECIFIC GRAVITY UA: 1.01 (ref 1–1.03)
SQUAMOUS EPITHELIAL: 10 /HPF
TOTAL IMMATURE NEUTOROPHIL: 0.01 K/CU MM
TOTAL NUCLEATED RBC: 0 K/CU MM
TOTAL PROTEIN: 6.3 GM/DL (ref 6.4–8.2)
TRICHOMONAS: ABNORMAL /HPF
TROPONIN T: <0.01 NG/ML
TROPONIN T: <0.01 NG/ML
UROBILINOGEN, URINE: 0.2 MG/DL (ref 0.2–1)
WBC # BLD: 4.3 K/CU MM (ref 4–10.5)
WBC UA: 9 /HPF (ref 0–5)

## 2023-04-01 PROCEDURE — 70450 CT HEAD/BRAIN W/O DYE: CPT

## 2023-04-01 PROCEDURE — 99285 EMERGENCY DEPT VISIT HI MDM: CPT

## 2023-04-01 PROCEDURE — C9113 INJ PANTOPRAZOLE SODIUM, VIA: HCPCS

## 2023-04-01 PROCEDURE — 82962 GLUCOSE BLOOD TEST: CPT

## 2023-04-01 PROCEDURE — 94761 N-INVAS EAR/PLS OXIMETRY MLT: CPT

## 2023-04-01 PROCEDURE — 96365 THER/PROPH/DIAG IV INF INIT: CPT

## 2023-04-01 PROCEDURE — 93005 ELECTROCARDIOGRAM TRACING: CPT | Performed by: NURSE PRACTITIONER

## 2023-04-01 PROCEDURE — 85025 COMPLETE CBC W/AUTO DIFF WBC: CPT

## 2023-04-01 PROCEDURE — 96376 TX/PRO/DX INJ SAME DRUG ADON: CPT

## 2023-04-01 PROCEDURE — 83690 ASSAY OF LIPASE: CPT

## 2023-04-01 PROCEDURE — 6360000002 HC RX W HCPCS: Performed by: NURSE PRACTITIONER

## 2023-04-01 PROCEDURE — 74176 CT ABD & PELVIS W/O CONTRAST: CPT

## 2023-04-01 PROCEDURE — 6360000004 HC RX CONTRAST MEDICATION: Performed by: EMERGENCY MEDICINE

## 2023-04-01 PROCEDURE — 2580000003 HC RX 258

## 2023-04-01 PROCEDURE — 6360000002 HC RX W HCPCS

## 2023-04-01 PROCEDURE — 70496 CT ANGIOGRAPHY HEAD: CPT

## 2023-04-01 PROCEDURE — 73560 X-RAY EXAM OF KNEE 1 OR 2: CPT

## 2023-04-01 PROCEDURE — 36415 COLL VENOUS BLD VENIPUNCTURE: CPT

## 2023-04-01 PROCEDURE — 71045 X-RAY EXAM CHEST 1 VIEW: CPT

## 2023-04-01 PROCEDURE — 82150 ASSAY OF AMYLASE: CPT

## 2023-04-01 PROCEDURE — 93971 EXTREMITY STUDY: CPT

## 2023-04-01 PROCEDURE — G0378 HOSPITAL OBSERVATION PER HR: HCPCS

## 2023-04-01 PROCEDURE — 85610 PROTHROMBIN TIME: CPT

## 2023-04-01 PROCEDURE — 96375 TX/PRO/DX INJ NEW DRUG ADDON: CPT

## 2023-04-01 PROCEDURE — 82270 OCCULT BLOOD FECES: CPT

## 2023-04-01 PROCEDURE — 84484 ASSAY OF TROPONIN QUANT: CPT

## 2023-04-01 PROCEDURE — 80053 COMPREHEN METABOLIC PANEL: CPT

## 2023-04-01 PROCEDURE — 96374 THER/PROPH/DIAG INJ IV PUSH: CPT

## 2023-04-01 PROCEDURE — 81001 URINALYSIS AUTO W/SCOPE: CPT

## 2023-04-01 PROCEDURE — 6370000000 HC RX 637 (ALT 250 FOR IP)

## 2023-04-01 PROCEDURE — 96372 THER/PROPH/DIAG INJ SC/IM: CPT

## 2023-04-01 PROCEDURE — 93010 ELECTROCARDIOGRAM REPORT: CPT | Performed by: INTERNAL MEDICINE

## 2023-04-01 PROCEDURE — 87040 BLOOD CULTURE FOR BACTERIA: CPT

## 2023-04-01 PROCEDURE — 84703 CHORIONIC GONADOTROPIN ASSAY: CPT

## 2023-04-01 RX ORDER — AMOXICILLIN AND CLAVULANATE POTASSIUM 875; 125 MG/1; MG/1
1 TABLET, FILM COATED ORAL 2 TIMES DAILY
Status: DISCONTINUED | OUTPATIENT
Start: 2023-04-01 | End: 2023-04-03 | Stop reason: HOSPADM

## 2023-04-01 RX ORDER — OXYCODONE HYDROCHLORIDE AND ACETAMINOPHEN 5; 325 MG/1; MG/1
1 TABLET ORAL EVERY 6 HOURS PRN
Status: DISCONTINUED | OUTPATIENT
Start: 2023-04-01 | End: 2023-04-03 | Stop reason: HOSPADM

## 2023-04-01 RX ORDER — SODIUM CHLORIDE 0.9 % (FLUSH) 0.9 %
5-40 SYRINGE (ML) INJECTION EVERY 12 HOURS SCHEDULED
Status: DISCONTINUED | OUTPATIENT
Start: 2023-04-01 | End: 2023-04-03 | Stop reason: HOSPADM

## 2023-04-01 RX ORDER — LEVETIRACETAM 500 MG/1
1000 TABLET ORAL 2 TIMES DAILY
Status: DISCONTINUED | OUTPATIENT
Start: 2023-04-01 | End: 2023-04-01

## 2023-04-01 RX ORDER — SODIUM CHLORIDE 0.9 % (FLUSH) 0.9 %
5-40 SYRINGE (ML) INJECTION PRN
Status: DISCONTINUED | OUTPATIENT
Start: 2023-04-01 | End: 2023-04-03 | Stop reason: HOSPADM

## 2023-04-01 RX ORDER — LEVOTHYROXINE SODIUM 0.12 MG/1
125 TABLET ORAL DAILY
Status: DISCONTINUED | OUTPATIENT
Start: 2023-04-02 | End: 2023-04-03 | Stop reason: HOSPADM

## 2023-04-01 RX ORDER — SCOLOPAMINE TRANSDERMAL SYSTEM 1 MG/1
1 PATCH, EXTENDED RELEASE TRANSDERMAL
Status: DISCONTINUED | OUTPATIENT
Start: 2023-04-01 | End: 2023-04-03 | Stop reason: HOSPADM

## 2023-04-01 RX ORDER — ENOXAPARIN SODIUM 100 MG/ML
30 INJECTION SUBCUTANEOUS 2 TIMES DAILY
Status: DISCONTINUED | OUTPATIENT
Start: 2023-04-01 | End: 2023-04-03 | Stop reason: HOSPADM

## 2023-04-01 RX ORDER — PANTOPRAZOLE SODIUM 40 MG/10ML
40 INJECTION, POWDER, LYOPHILIZED, FOR SOLUTION INTRAVENOUS DAILY
Status: DISCONTINUED | OUTPATIENT
Start: 2023-04-01 | End: 2023-04-03 | Stop reason: HOSPADM

## 2023-04-01 RX ORDER — ONDANSETRON 2 MG/ML
4 INJECTION INTRAMUSCULAR; INTRAVENOUS ONCE
Status: COMPLETED | OUTPATIENT
Start: 2023-04-01 | End: 2023-04-01

## 2023-04-01 RX ORDER — SODIUM CHLORIDE 9 MG/ML
INJECTION, SOLUTION INTRAVENOUS PRN
Status: DISCONTINUED | OUTPATIENT
Start: 2023-04-01 | End: 2023-04-03 | Stop reason: HOSPADM

## 2023-04-01 RX ORDER — ACETAMINOPHEN 325 MG/1
650 TABLET ORAL ONCE
Status: DISCONTINUED | OUTPATIENT
Start: 2023-04-01 | End: 2023-04-03 | Stop reason: HOSPADM

## 2023-04-01 RX ORDER — ONDANSETRON 2 MG/ML
4 INJECTION INTRAMUSCULAR; INTRAVENOUS EVERY 6 HOURS PRN
Status: DISCONTINUED | OUTPATIENT
Start: 2023-04-01 | End: 2023-04-03 | Stop reason: HOSPADM

## 2023-04-01 RX ORDER — ONDANSETRON 4 MG/1
4 TABLET, ORALLY DISINTEGRATING ORAL EVERY 8 HOURS PRN
Status: DISCONTINUED | OUTPATIENT
Start: 2023-04-01 | End: 2023-04-03 | Stop reason: HOSPADM

## 2023-04-01 RX ORDER — SULFASALAZINE 500 MG/1
500 TABLET ORAL 2 TIMES DAILY
Status: DISCONTINUED | OUTPATIENT
Start: 2023-04-01 | End: 2023-04-03 | Stop reason: HOSPADM

## 2023-04-01 RX ORDER — LANOLIN ALCOHOL/MO/W.PET/CERES
3 CREAM (GRAM) TOPICAL NIGHTLY PRN
Status: DISCONTINUED | OUTPATIENT
Start: 2023-04-01 | End: 2023-04-03 | Stop reason: HOSPADM

## 2023-04-01 RX ADMIN — ONDANSETRON 4 MG: 2 INJECTION INTRAMUSCULAR; INTRAVENOUS at 16:28

## 2023-04-01 RX ADMIN — IOPAMIDOL 80 ML: 755 INJECTION, SOLUTION INTRAVENOUS at 10:56

## 2023-04-01 RX ADMIN — HYDROMORPHONE HYDROCHLORIDE 0.25 MG: 1 INJECTION, SOLUTION INTRAMUSCULAR; INTRAVENOUS; SUBCUTANEOUS at 20:07

## 2023-04-01 RX ADMIN — ONDANSETRON 4 MG: 2 INJECTION INTRAMUSCULAR; INTRAVENOUS at 12:25

## 2023-04-01 RX ADMIN — ONDANSETRON 4 MG: 2 INJECTION INTRAMUSCULAR; INTRAVENOUS at 21:42

## 2023-04-01 RX ADMIN — ENOXAPARIN SODIUM 30 MG: 100 INJECTION SUBCUTANEOUS at 21:43

## 2023-04-01 RX ADMIN — PANTOPRAZOLE SODIUM 40 MG: 40 INJECTION, POWDER, FOR SOLUTION INTRAVENOUS at 17:38

## 2023-04-01 RX ADMIN — SODIUM CHLORIDE, PRESERVATIVE FREE 10 ML: 5 INJECTION INTRAVENOUS at 21:42

## 2023-04-01 RX ADMIN — LEVETIRACETAM 1000 MG: 100 INJECTION, SOLUTION INTRAVENOUS at 23:02

## 2023-04-01 ASSESSMENT — PAIN DESCRIPTION - DESCRIPTORS: DESCRIPTORS: SHARP;ACHING;THROBBING

## 2023-04-01 ASSESSMENT — PAIN DESCRIPTION - PAIN TYPE: TYPE: ACUTE PAIN

## 2023-04-01 ASSESSMENT — PAIN DESCRIPTION - FREQUENCY: FREQUENCY: CONTINUOUS

## 2023-04-01 ASSESSMENT — ENCOUNTER SYMPTOMS
ABDOMINAL PAIN: 1
DIARRHEA: 1
NAUSEA: 1
VOMITING: 1

## 2023-04-01 ASSESSMENT — PAIN SCALES - GENERAL
PAINLEVEL_OUTOF10: 6
PAINLEVEL_OUTOF10: 7

## 2023-04-01 ASSESSMENT — PAIN DESCRIPTION - LOCATION
LOCATION: ABDOMEN
LOCATION: ABDOMEN;KNEE

## 2023-04-01 ASSESSMENT — PAIN DESCRIPTION - ORIENTATION: ORIENTATION: LEFT

## 2023-04-01 NOTE — ED PROVIDER NOTES
I independently examined and evaluated Andre Pope. Stroke alert called at 1015 by NP Krystle Recinos- I went to evaluate patient    In brief, 41-year-old female with complaint of abdominal pain, knee pain, swelling, generally not feeling well, and then apparently stated that she was having left-sided numbness and weakness since midnight. Also for a week has had blurred vision. She has multiple complaints. Multiple of these are also chronic, she has had infection of her left knee previously. She has chronic abdominal pain and left knee pain. She also reports chest pain, she initially told the nurse practitioner that she had chest pain starting a couple of hours ago when she told me that she had been having it all week off and on and that is why she thought maybe she was weak today. .    Focused exam revealed patient in no acute distress, seated on the cot, regular rate and rhythm, nonlabored respirations. Abdomen soft and nondistended. No peripheral edema. She can move all limbs to command, I do not note a significant deficit in left side compared to right. She has no facial droop or aphasia. Jesus Rodriguez CC/HPI Summary, DDx, ED Course, and Reassessment: Patient with multiple medical problems, well-known to this department, presented with multiple complaints including some of her chronic plain complaints but then also stated that she had had left-sided weakness and numbness since midnight and so a stroke alert was called. Imaging did not show any evidence of LVO or obvious stroke. No shift or bleed or swelling. Her electrolytes appear to be stable compared to previous. Her abdominal pain is chronic, I do not feel she requires CT scan at this time for this. Regarding her chest pain, her EKG is unchanged compared to previous, and she has normal troponin.   We will admit her for stroke rule out, Dr. Rossana Silva did evaluate her on the telestroke machine    ED Course as of 04/01/23 1328   Sat Apr 01, 2023   1206
Sensation intact.     NIH Stroke Scale  (Total score at bottom)      (1A) LOC  (Alert?):  0 - alert; keenly responsive    (1B) LOC Questions (Month / Age):  0 - answers both questions correctly     (1C) LOC Command  (Close eyes, squeeze my hands):  0 - performs both tasks correctly    (2) Best Gaze  (Eyes open-patient follows finger or face):  0 - normal    (3) Visual  (Introduce visual stimulus to patient's visual field quadrants):  0 - no visual loss    (4)  Facial palsy  (Show teeth, raise eyebrows and squeeze eyes shut):  0 - normal symmetric movement    (5) Motor arms  (Elevate extremity to 90° in sitting or 45° if supine -  score drift/movement)       (5A)  motor arm LEFT :  0 - no drift, limb holds 90 (or 45) degrees for full 10 seconds       (5B) motor arm RIGHT:   0 - no drift, limb holds 90 (or 45) degrees for full 10 seconds      (6) Motor legs  (Elevate extremity to 30° while supine and score drift/movement)       (6A)  motor leg LEFT:   0 - no drift; leg holds 30 degree position for full 5 seconds       (6B) motor leg RIGHT:  0 - no drift; leg holds 30 degree position for full 5 seconds      (7)  Limb Ataxia  (Finger-nose, heel-shin):  0 - absent    (8) Sensory  (face, arms trunk, and legs-compare side to side):  1- normal; less on left leg    (9)  Best language  (Name items, describes a picture, read sentence-see attached testing cards):  0 - no aphasia, normal    (10)  Dysarthria  (Evaluate speech clarity by patient repeating listed words):  0 - normal    (11)  Extinction and inattention  (can feel \"left, right, or both sides\" of face, arms, legs w/ closed eyes) (can see moving index finger in \"left, right, or both\":  0 - no abnormality        Total NIHSS:   1    Psychiatric:  Affect normal, Mood normal.       Labs:  Results for orders placed or performed during the hospital encounter of 04/01/23   CBC with Auto Differential   Result Value Ref Range    WBC 4.3 4.0 - 10.5 K/CU MM    RBC 3.80 (L) 4.2 -

## 2023-04-01 NOTE — H&P
collection. Grey-white differentiation is maintained. No evidence of mass, mass effect or midline shift. No evidence of hydrocephalus. ORBITS: The visualized portion of the orbits demonstrate no acute abnormality. SINUSES:  The visualized paranasal sinuses and mastoid air cells demonstrate no acute abnormality. SOFT TISSUES/SKULL: No acute abnormality of the visualized skull or soft tissues. CTA NECK: AORTIC ARCH/ARCH VESSELS: No dissection or arterial injury. No significant stenosis of the brachiocephalic or subclavian arteries. CAROTID ARTERIES: No dissection, arterial injury, or hemodynamically significant stenosis by NASCET criteria. VERTEBRAL ARTERIES: No dissection, arterial injury, or significant stenosis. SOFT TISSUES: The lung apices are clear. No cervical or superior mediastinal lymphadenopathy. The larynx and pharynx are unremarkable. No acute abnormality of the salivary and thyroid glands. Partially imaged left chest wall subclavian approach MediPort. BONES: No acute osseous abnormality. CTA HEAD: ANTERIOR CIRCULATION: No significant stenosis of the intracranial internal carotid, anterior cerebral, or middle cerebral arteries. No aneurysm. 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 No significant stenosis of the vertebral, basilar, or posterior cerebral arteries. No aneurysm. OTHER: No dural venous sinus thrombosis on this non-dedicated study. 1. No acute cortical infarct or other acute intracranial process. 2.  No apparent arterial high grade stenosis, occlusion or aneurysm within the head or neck. 3. 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  The noncontrast head CT findings were sent to the Radiology Results [id number] at 10:54 am on 4/1/2023 to be communicated to

## 2023-04-01 NOTE — PROGRESS NOTES
GENERAL SURGERY PROGRESS NOTE    CC/HPI:           Patient does NOT feel better and started to vomit again, no Is and Os. .... Vitals:    08/20/20 1327 08/20/20 1430 08/20/20 1915 08/21/20 0645   BP:  121/63 120/61 (!) 112/55   Pulse:  66 53    Resp: 19 16 18    Temp:  98.9 °F (37.2 °C) 97.8 °F (36.6 °C) 97.7 °F (36.5 °C)   TempSrc:  Oral Oral Oral   SpO2: 97%   100%   Weight:    278 lb (126.1 kg)   Height:         No intake/output data recorded. No intake/output data recorded.     DIET GENERAL;    Recent Results (from the past 48 hour(s))   Basic Metabolic Panel w/ Reflex to MG    Collection Time: 08/19/20  5:05 PM   Result Value Ref Range    Sodium 141 135 - 145 MMOL/L    Potassium 4.1 3.5 - 5.1 MMOL/L    Chloride 111 (H) 99 - 110 mMol/L    CO2 21 21 - 32 MMOL/L    Anion Gap 9 4 - 16    BUN 14 6 - 23 MG/DL    CREATININE 0.8 0.6 - 1.1 MG/DL    Glucose 128 (H) 70 - 99 MG/DL    Calcium 9.4 8.3 - 10.6 MG/DL    GFR Non-African American >60 >60 mL/min/1.73m2    GFR African American >60 >60 mL/min/1.73m2   CBC auto differential    Collection Time: 08/19/20  5:05 PM   Result Value Ref Range    WBC 5.3 4.0 - 10.5 K/CU MM    RBC 3.82 (L) 4.2 - 5.4 M/CU MM    Hemoglobin 10.5 (L) 12.5 - 16.0 GM/DL    Hematocrit 34.3 (L) 37 - 47 %    MCV 89.8 78 - 100 FL    MCH 27.5 27 - 31 PG    MCHC 30.6 (L) 32.0 - 36.0 %    RDW 14.0 11.7 - 14.9 %    Platelets 171 529 - 211 K/CU MM    MPV 9.9 7.5 - 11.1 FL    Differential Type AUTOMATED DIFFERENTIAL     Segs Relative 62.9 36 - 66 %    Lymphocytes % 25.3 24 - 44 %    Monocytes % 6.9 (H) 0 - 4 %    Eosinophils % 3.9 (H) 0 - 3 %    Basophils % 0.8 0 - 1 %    Segs Absolute 3.4 K/CU MM    Lymphocytes Absolute 1.4 K/CU MM    Monocytes Absolute 0.4 K/CU MM    Eosinophils Absolute 0.2 K/CU MM    Basophils Absolute 0.0 K/CU MM    Nucleated RBC % 0.0 %    Total Nucleated RBC 0.0 K/CU MM    Total Immature Neutrophil 0.01 K/CU MM    Immature Neutrophil % 0.2 0 - 0.43 %   CBC    Collection Time: 08/20/20  6:40 AM   Result Value Ref Range    WBC 4.7 4.0 - 10.5 K/CU MM    RBC 3.86 (L) 4.2 - 5.4 M/CU MM    Hemoglobin 10.4 (L) 12.5 - 16.0 GM/DL    Hematocrit 33.4 (L) 37 - 47 %    MCV 86.5 78 - 100 FL    MCH 26.9 (L) 27 - 31 PG    MCHC 31.1 (L) 32.0 - 36.0 %    RDW 14.1 11.7 - 14.9 %    Platelets 385 498 - 554 K/CU MM    MPV 9.9 7.5 - 11.1 FL       Scheduled Meds:   scopolamine  1 patch Transdermal Q72H    pantoprazole  40 mg Oral BID AC    sucralfate  1 g Oral 2 times per day    ondansetron  4 mg Intravenous Q4H    PARoxetine  50 mg Oral Nightly    gabapentin  800 mg Oral TID    levothyroxine  125 mcg Oral Daily    therapeutic multivitamin-minerals  1 tablet Oral Daily    calcium-vitamin D  1 tablet Oral BID    estradiol  0.5 mg Oral Daily    atenolol  25 mg Oral Daily    vitamin D  5,000 Units Oral Daily    levETIRAcetam  1,000 mg Oral BID    sodium chloride flush  10 mL Intravenous BID    nicotine  1 patch Transdermal Daily    enoxaparin  40 mg Subcutaneous Daily    guaiFENesin  1,200 mg Oral BID       Continuous Infusions:   sodium chloride 75 mL/hr at 08/20/20 1525       Physical Exam:  HEENT: Anicteric sclerae, Oropharyngeal mucosae moist, pink and intact. Heart:  Normal S1 and S2, RRR  Lungs: Clear to auscultation bilaterally, No audible Wheezes or Rales. Extremities: No edema. Neuro: Alert and Oriented x 3, Non focal.  Abdomen: Soft, Benign, RUQ tender, Non distended, Positive bowel sounds. Active Problems:    Intractable vomiting with nausea    Morbid obesity with BMI of 40.0-44.9, adult (HCC)    History of Jet-en-Y gastric bypass    Seizure (HCC)    Abdominal pain    Anxiety  Resolved Problems:    * No resolved hospital problems. *      Assessment and Plan:    Estefanía Steen is a very pleasant 44 y.o. female presenting with a h/o sleeve gasrtrectomy, and described nausea and vomiting and abdominal pain post prandially, with sweet food. . re-counseled her IN LENGTH, and consulted dietitian to help her with food choices, and Dr Benedict Frost consulted Dr Francesco Saint yesterday to help her RUQ pain. Rah Enrique and I'll follow. . Her case was discussed in length with both dr Benedict Frost, and dr Francesco Saint. She had ERCP performed before her bariatric surgeries, and stents placed in her CBD before, this explains her air in her biliary tree. .    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 Domitila Underwood  ___________________________________________    Yaneli Cortez MD, FACS, FICS  8/21/2020  7:28 AM No

## 2023-04-01 NOTE — CARE COORDINATION
MCG criteria for abdominal pain reviewed. At this time, criteria supports observation.
discuss the discharge plan with any other family members/significant others, and if so, who? No  Plans to Return to Present Housing: Yes  Other Identified Issues/Barriers to RETURNING to current housing: none  Potential Assistance needed at discharge: N/A            Potential DME:    Patient expects to discharge to: [address] [place] [address] for transportation at discharge:      Financial    Payor: Don López / Plan: [employer] [facility] / Product Type: *No Product type* /     Does insurance require precert for SNF: Yes    Potential assistance Purchasing Medications: No  Meds-to-Beds request:        [address] [facility] [address] [phone] Bill Everett [phone]  [address] [id number] [place] [id number]  Phone: [phone] Fax: [phone]      Notes:    Factors facilitating achievement of predicted outcomes: Pleasant    Barriers to discharge: Pain, Limited family support, and Medication managment    Additional Case Management Notes: . Chart reviewed. The patient is from home with significant other and plans to return. The patient has a PCP and insurance and can afford and take her medications as prescribed. The patient has reliable transportation-she drives. The patient is independent in ADL's and uses a walker as needed to get around. She states she follows up with pain doctor and the only reason she came to the hospital was because she was vomiting up her medications. She states she has \"flair ups\" where she is in a lot of pain in her pancreas. She denies [facility] unless she needs home IV antx. The patient denies any other needs at this time. Patient is attempting to go to [place] to be closer to there daughter.      The Plan for Transition of Care is related to the following treatment goals of Abdominal pain [R10.9]  Left-sided weakness [R53.1]  Pain of upper abdomen [R10.10]  Acute pain of left knee [M25.562]  Nausea and vomiting, unspecified vomiting type [A40.6]    IF APPLICABLE:

## 2023-04-01 NOTE — ED NOTES
BED [address] [place] [address]  04/01/23 1410
Face sheet faxed to [facility] [place]..  [place]  04/01/23 3460
Patient refused to take tylenol and stated that she has percocet at home she is able to take. Patient is wanting to speak with Ariel Shrestha CNP, notified Ariel Shrestha of this.       Donovan Stack RN  04/01/23 2583
Waiting bed     Iva Grayson  04/01/23 9405
components within normal limits   POCT GLUCOSE - Abnormal; Notable for the following components:    POC Glucose 101 (*)     All other components within normal limits     Critical values: no     Abnormal Assessment Findings: none    Background  History:   Past Medical History:   Diagnosis Date    Acid reflux     Anemia     Anxiety     Arthritis     Hands, Back And Ankles    Arthritis     Bleeding ulcer 2014    \"I Had Ulcers In My Stomach And Colon\"/ per pt on 8/12/2019\"they said recently having some blood in my stomach- in July ( 2019)could not find where coming from \"    Bronchitis Last Episode 2014    Chronic back pain     Chronic pain     [facility]    COPD (chronic obstructive pulmonary disease) ([facility] [address]     Sees Dr. Brandt Factor Depression     Disease of blood and blood forming organ     Fibromyalgia Dx 2013    GERD (gastroesophageal reflux disease)     H/O echocardiogram 08/11/2015    EF >55%. LA to be at the upper limit of normal in size. LV hypertrophy with normal LV systolic, but abnormal diastolic function. Normal valvular structures and function.  H/O echocardiogram 08/30/2018    EF 55-60%    Hiatal hernia     History of blood transfusion 09/2015 And 2018    No Reaction To Blood Transfusions Received    History of sleeve gastrectomy     [place] (hard of hearing)     Right Ear    Hx of cardiac catheterization 10/24/2010    Angiographically normal coronary arteries w/ normal LV function and wall motion.  Hx of transesophageal echocardiography (PILO) for monitoring 12/02/2010    EF 55-60%. WNL.    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 name    Hypertension     Immune

## 2023-04-02 LAB
ALBUMIN SERPL-MCNC: 3.9 GM/DL (ref 3.4–5)
ALP BLD-CCNC: 120 IU/L (ref 40–128)
ALT SERPL-CCNC: 15 U/L (ref 10–40)
ANION GAP SERPL CALCULATED.3IONS-SCNC: 5 MMOL/L (ref 4–16)
AST SERPL-CCNC: 17 IU/L (ref 15–37)
BASOPHILS ABSOLUTE: 0 K/CU MM
BASOPHILS RELATIVE PERCENT: 0.8 % (ref 0–1)
BILIRUB SERPL-MCNC: 0.2 MG/DL (ref 0–1)
BUN SERPL-MCNC: 11 MG/DL (ref 6–23)
CALCIUM SERPL-MCNC: 10.1 MG/DL (ref 8.3–10.6)
CHLORIDE BLD-SCNC: 106 MMOL/L (ref 99–110)
CO2: 28 MMOL/L (ref 21–32)
CREAT SERPL-MCNC: 0.6 MG/DL (ref 0.6–1.1)
DIFFERENTIAL TYPE: ABNORMAL
EKG ATRIAL RATE: 56 BPM
EKG DIAGNOSIS: NORMAL
EKG P AXIS: 37 DEGREES
EKG P-R INTERVAL: 194 MS
EKG Q-T INTERVAL: 430 MS
EKG QRS DURATION: 82 MS
EKG QTC CALCULATION (BAZETT): 414 MS
EKG R AXIS: -21 DEGREES
EKG T AXIS: 13 DEGREES
EKG VENTRICULAR RATE: 56 BPM
EOSINOPHILS ABSOLUTE: 0.2 K/CU MM
EOSINOPHILS RELATIVE PERCENT: 4.8 % (ref 0–3)
GFR SERPL CREATININE-BSD FRML MDRD: >60 ML/MIN/1.73M2
GLUCOSE SERPL-MCNC: 92 MG/DL (ref 70–99)
HCT VFR BLD CALC: 33.3 % (ref 37–47)
HEMOGLOBIN: 10 GM/DL (ref 12.5–16)
IMMATURE NEUTROPHIL %: 0.3 % (ref 0–0.43)
LACTATE: 0.9 MMOL/L (ref 0.5–1.9)
LYMPHOCYTES ABSOLUTE: 1.8 K/CU MM
LYMPHOCYTES RELATIVE PERCENT: 47.5 % (ref 24–44)
MCH RBC QN AUTO: 26.2 PG (ref 27–31)
MCHC RBC AUTO-ENTMCNC: 30 % (ref 32–36)
MCV RBC AUTO: 87.2 FL (ref 78–100)
MONOCYTES ABSOLUTE: 0.3 K/CU MM
MONOCYTES RELATIVE PERCENT: 7.4 % (ref 0–4)
NUCLEATED RBC %: 0 %
PDW BLD-RTO: 14.5 % (ref 11.7–14.9)
PLATELET # BLD: 207 K/CU MM (ref 140–440)
PMV BLD AUTO: 10 FL (ref 7.5–11.1)
POTASSIUM SERPL-SCNC: 4.5 MMOL/L (ref 3.5–5.1)
RBC # BLD: 3.82 M/CU MM (ref 4.2–5.4)
SEGMENTED NEUTROPHILS ABSOLUTE COUNT: 1.5 K/CU MM
SEGMENTED NEUTROPHILS RELATIVE PERCENT: 39.2 % (ref 36–66)
SODIUM BLD-SCNC: 139 MMOL/L (ref 135–145)
TOTAL IMMATURE NEUTOROPHIL: 0.01 K/CU MM
TOTAL NUCLEATED RBC: 0 K/CU MM
TOTAL PROTEIN: 6 GM/DL (ref 6.4–8.2)
TRIGL SERPL-MCNC: 129 MG/DL
TROPONIN T: <0.01 NG/ML
WBC # BLD: 3.8 K/CU MM (ref 4–10.5)

## 2023-04-02 PROCEDURE — C9113 INJ PANTOPRAZOLE SODIUM, VIA: HCPCS

## 2023-04-02 PROCEDURE — 96376 TX/PRO/DX INJ SAME DRUG ADON: CPT

## 2023-04-02 PROCEDURE — 80053 COMPREHEN METABOLIC PANEL: CPT

## 2023-04-02 PROCEDURE — 6370000000 HC RX 637 (ALT 250 FOR IP)

## 2023-04-02 PROCEDURE — 93010 ELECTROCARDIOGRAM REPORT: CPT | Performed by: INTERNAL MEDICINE

## 2023-04-02 PROCEDURE — 6360000002 HC RX W HCPCS

## 2023-04-02 PROCEDURE — 93005 ELECTROCARDIOGRAM TRACING: CPT

## 2023-04-02 PROCEDURE — 36591 DRAW BLOOD OFF VENOUS DEVICE: CPT

## 2023-04-02 PROCEDURE — 84478 ASSAY OF TRIGLYCERIDES: CPT

## 2023-04-02 PROCEDURE — G0378 HOSPITAL OBSERVATION PER HR: HCPCS

## 2023-04-02 PROCEDURE — 99232 SBSQ HOSP IP/OBS MODERATE 35: CPT | Performed by: ORTHOPAEDIC SURGERY

## 2023-04-02 PROCEDURE — 2580000003 HC RX 258

## 2023-04-02 PROCEDURE — 6360000002 HC RX W HCPCS: Performed by: STUDENT IN AN ORGANIZED HEALTH CARE EDUCATION/TRAINING PROGRAM

## 2023-04-02 PROCEDURE — 83605 ASSAY OF LACTIC ACID: CPT

## 2023-04-02 PROCEDURE — 6360000002 HC RX W HCPCS: Performed by: NURSE PRACTITIONER

## 2023-04-02 PROCEDURE — 94761 N-INVAS EAR/PLS OXIMETRY MLT: CPT

## 2023-04-02 PROCEDURE — 96372 THER/PROPH/DIAG INJ SC/IM: CPT

## 2023-04-02 PROCEDURE — 96366 THER/PROPH/DIAG IV INF ADDON: CPT

## 2023-04-02 PROCEDURE — 85025 COMPLETE CBC W/AUTO DIFF WBC: CPT

## 2023-04-02 RX ORDER — PROMETHAZINE HYDROCHLORIDE 25 MG/ML
6.25 INJECTION, SOLUTION INTRAMUSCULAR; INTRAVENOUS ONCE
Status: COMPLETED | OUTPATIENT
Start: 2023-04-02 | End: 2023-04-02

## 2023-04-02 RX ADMIN — SULFASALAZINE 500 MG: 500 TABLET ORAL at 08:00

## 2023-04-02 RX ADMIN — OXYCODONE HYDROCHLORIDE AND ACETAMINOPHEN 1 TABLET: 5; 325 TABLET ORAL at 08:03

## 2023-04-02 RX ADMIN — ENOXAPARIN SODIUM 30 MG: 100 INJECTION SUBCUTANEOUS at 23:01

## 2023-04-02 RX ADMIN — PANTOPRAZOLE SODIUM 40 MG: 40 INJECTION, POWDER, FOR SOLUTION INTRAVENOUS at 07:50

## 2023-04-02 RX ADMIN — HYDROMORPHONE HYDROCHLORIDE 0.25 MG: 1 INJECTION, SOLUTION INTRAMUSCULAR; INTRAVENOUS; SUBCUTANEOUS at 23:00

## 2023-04-02 RX ADMIN — ONDANSETRON 4 MG: 2 INJECTION INTRAMUSCULAR; INTRAVENOUS at 04:26

## 2023-04-02 RX ADMIN — ONDANSETRON 4 MG: 4 TABLET, ORALLY DISINTEGRATING ORAL at 14:21

## 2023-04-02 RX ADMIN — AMOXICILLIN AND CLAVULANATE POTASSIUM 1 TABLET: 875; 125 TABLET, FILM COATED ORAL at 07:50

## 2023-04-02 RX ADMIN — HYDROMORPHONE HYDROCHLORIDE 0.25 MG: 1 INJECTION, SOLUTION INTRAMUSCULAR; INTRAVENOUS; SUBCUTANEOUS at 04:26

## 2023-04-02 RX ADMIN — LEVOTHYROXINE SODIUM 125 MCG: 0.12 TABLET ORAL at 07:50

## 2023-04-02 RX ADMIN — ONDANSETRON 4 MG: 2 INJECTION INTRAMUSCULAR; INTRAVENOUS at 21:58

## 2023-04-02 RX ADMIN — SULFASALAZINE 500 MG: 500 TABLET ORAL at 23:00

## 2023-04-02 RX ADMIN — LEVETIRACETAM 1000 MG: 100 INJECTION, SOLUTION INTRAVENOUS at 07:52

## 2023-04-02 RX ADMIN — LEVETIRACETAM 1000 MG: 100 INJECTION, SOLUTION INTRAVENOUS at 19:51

## 2023-04-02 RX ADMIN — OXYCODONE HYDROCHLORIDE AND ACETAMINOPHEN 1 TABLET: 5; 325 TABLET ORAL at 14:21

## 2023-04-02 RX ADMIN — PROMETHAZINE HYDROCHLORIDE 6.25 MG: 25 INJECTION INTRAMUSCULAR; INTRAVENOUS at 10:30

## 2023-04-02 RX ADMIN — Medication 3 MG: at 23:00

## 2023-04-02 RX ADMIN — ENOXAPARIN SODIUM 30 MG: 100 INJECTION SUBCUTANEOUS at 07:51

## 2023-04-02 RX ADMIN — SODIUM CHLORIDE, PRESERVATIVE FREE 10 ML: 5 INJECTION INTRAVENOUS at 07:56

## 2023-04-02 ASSESSMENT — PAIN SCALES - GENERAL
PAINLEVEL_OUTOF10: 5
PAINLEVEL_OUTOF10: 6
PAINLEVEL_OUTOF10: 4
PAINLEVEL_OUTOF10: 6
PAINLEVEL_OUTOF10: 4
PAINLEVEL_OUTOF10: 7

## 2023-04-02 ASSESSMENT — PAIN SCALES - WONG BAKER: WONGBAKER_NUMERICALRESPONSE: 0

## 2023-04-02 ASSESSMENT — PAIN DESCRIPTION - LOCATION
LOCATION: ABDOMEN
LOCATION: ABDOMEN;KNEE
LOCATION: ABDOMEN

## 2023-04-02 ASSESSMENT — PAIN DESCRIPTION - DESCRIPTORS
DESCRIPTORS: ACHING;CRAMPING
DESCRIPTORS: ACHING

## 2023-04-02 ASSESSMENT — PAIN DESCRIPTION - ORIENTATION: ORIENTATION: MID

## 2023-04-02 NOTE — PROGRESS NOTES
Social Determinants of Health     Financial Resource Strain: Low Risk     Difficulty of Paying Living Expenses: Not hard at all   Food Insecurity: No Food Insecurity    Worried About Running Out of Food in the Last Year: Never true    Ran Out of Food in the Last Year: Never true   Transportation Needs: Unknown    Lack of Transportation (Non-Medical): No   Housing Stability: Unknown    Unstable Housing in the Last Year: No         FAMILY HISTORY     Family History   Problem Relation Age of Onset    Diabetes Mother         \"Borderline Diabetes\"    High Blood Pressure Mother     Obesity Mother     Arthritis Mother     Heart Disease Mother     High Cholesterol Mother     Vision Loss Mother     Diabetes Father     High Blood Pressure Father     Asthma Father     Cancer Father         prostate cancer    High Blood Pressure Brother     Asthma Son     Vision Loss Son     Lupus Daughter     Other Daughter         \"Alot Of Female Problems\"         PHYSICAL EXAM     Wt Readings from Last 3 Encounters:   04/01/23 234 lb (106.1 kg)   03/27/23 241 lb (109.3 kg)   03/06/23 240 lb (108.9 kg)     Temp Readings from Last 3 Encounters:   04/02/23 97.9 °F (36.6 °C) (Oral)   02/23/23 98.3 °F (36.8 °C) (Oral)   02/16/23 98.7 °F (37.1 °C)     BP Readings from Last 3 Encounters:   04/02/23 128/77   03/27/23 138/82   03/06/23 120/80     Pulse Readings from Last 3 Encounters:   04/02/23 63   03/27/23 69   03/06/23 74       General appearance:  Alert and oriented, NAD, well developed   HEENT: EOMI, no scleral injection, moist mucous membranes, no oral ulcers, normal hearing, no cartilage inflammation  Neck: Trachea midline, no masses  Lymph: no LAD  Lungs: CTAB, no rales  Heart: regular rate and rhythm, S1, S2 normal, no murmur, no lower extremity edema  Abdomen: Soft, ND, NT. + BS. Extremities: atraumatic, no cyanosis or edema. Neurologic: CN 2-12 grossly intact.    Skin: Bilateral elbow psoriasis+, warm and dry, no telangiectasias, no

## 2023-04-02 NOTE — CONSULTS
CONSULT DICTATED [id number]
had left knee  arthroscopy done on 09/20/2022. ALLERGIES:  The patient has multiple allergies, please refer to the  chart. PHYSICAL EXAMINATION:  GENERAL:  Physical examination shows a 52-year-old white female who is  obese, lying flat in bed, in no acute distress. She is awake, alert,  and oriented, and pleasant to talk with. VITAL SIGNS:  Stable. HEENT:  Examination shows skull to be atraumatic. NECK:  Supple. CHEST:  Clear. HEART:  S1, S2, is normal.  ABDOMEN:  Obese, soft, and nondistended with mild tenderness in the  right upper quadrant. No guarding or rigidity. Liver and spleen are  not palpable. Bowel sounds are present. RECTAL:  Exam is deferred. CNS:  Exam shows the patient to be awake, alert, and oriented. There  are no focal sensorimotor sign. MUSCULOSKELETAL SYSTEM:  Exam shows evidence of degenerative joint  disease changes. LABORATORY DATA:  As above mentioned. IMPRESSIONS:  3  A 52-year-old white female with multiple comorbidities, admitted  with recurrent intractable right upper quadrant abdominal pain and also  weakness in the left upper and lower extremities and along with nausea  and vomiting. Etiology to be determined. 2.  No evidence of gross GI bleeding. RECOMMENDATIONS:  1.  I agree with present management with Protonix. 2.  Monitor the patient's CBC, CMP. 3.  We will start the patient on clear liquid diet, advance as  tolerated. 4.  Parenteral analgesics and antiemetics as needed. 5.  The patient has been instructed to call the office after discharge  for repeat EGD and also followup screening colonoscopy. 6.  The case and plan have been discussed in detail with the patient,  and all her questions have been answered.         Zehra Peguero MD    D: 04/02/2023 6:59:30       T: 04/02/2023 7:02:12     [email or web address]  Job#: [id number]     Doc#: [id number]    CC:

## 2023-04-03 VITALS
DIASTOLIC BLOOD PRESSURE: 78 MMHG | HEART RATE: 62 BPM | SYSTOLIC BLOOD PRESSURE: 127 MMHG | HEIGHT: 64 IN | RESPIRATION RATE: 16 BRPM | WEIGHT: 234 LBS | BODY MASS INDEX: 39.95 KG/M2 | OXYGEN SATURATION: 91 % | TEMPERATURE: 98 F

## 2023-04-03 LAB
ALBUMIN SERPL-MCNC: 3.9 GM/DL (ref 3.4–5)
ALP BLD-CCNC: 131 IU/L (ref 40–128)
ALT SERPL-CCNC: 14 U/L (ref 10–40)
AMPHETAMINES: NEGATIVE
ANION GAP SERPL CALCULATED.3IONS-SCNC: 6 MMOL/L (ref 4–16)
AST SERPL-CCNC: 16 IU/L (ref 15–37)
BARBITURATE SCREEN URINE: NEGATIVE
BASOPHILS ABSOLUTE: 0 K/CU MM
BASOPHILS RELATIVE PERCENT: 0.7 % (ref 0–1)
BENZODIAZEPINE SCREEN, URINE: NEGATIVE
BILIRUB SERPL-MCNC: 0.2 MG/DL (ref 0–1)
BUN SERPL-MCNC: 14 MG/DL (ref 6–23)
CALCIUM SERPL-MCNC: 10 MG/DL (ref 8.3–10.6)
CANNABINOID SCREEN URINE: NEGATIVE
CHLORIDE BLD-SCNC: 108 MMOL/L (ref 99–110)
CO2: 27 MMOL/L (ref 21–32)
COCAINE METABOLITE: NEGATIVE
CREAT SERPL-MCNC: 0.7 MG/DL (ref 0.6–1.1)
CULTURE: NORMAL
DIFFERENTIAL TYPE: ABNORMAL
EKG ATRIAL RATE: 66 BPM
EKG DIAGNOSIS: NORMAL
EKG P AXIS: 48 DEGREES
EKG P-R INTERVAL: 186 MS
EKG Q-T INTERVAL: 406 MS
EKG QRS DURATION: 82 MS
EKG QTC CALCULATION (BAZETT): 425 MS
EKG R AXIS: -18 DEGREES
EKG T AXIS: 30 DEGREES
EKG VENTRICULAR RATE: 66 BPM
EOSINOPHILS ABSOLUTE: 0.3 K/CU MM
EOSINOPHILS RELATIVE PERCENT: 5.7 % (ref 0–3)
FUNGUS STAIN: NORMAL
GFR SERPL CREATININE-BSD FRML MDRD: >60 ML/MIN/1.73M2
GLUCOSE SERPL-MCNC: 95 MG/DL (ref 70–99)
HCT VFR BLD CALC: 33.2 % (ref 37–47)
HEMOGLOBIN: 10.1 GM/DL (ref 12.5–16)
IMMATURE NEUTROPHIL %: 0 % (ref 0–0.43)
LV EF: 58 %
LVEF MODALITY: NORMAL
LYMPHOCYTES ABSOLUTE: 1.7 K/CU MM
LYMPHOCYTES RELATIVE PERCENT: 39.2 % (ref 24–44)
Lab: NORMAL
MCH RBC QN AUTO: 26.2 PG (ref 27–31)
MCHC RBC AUTO-ENTMCNC: 30.4 % (ref 32–36)
MCV RBC AUTO: 86.2 FL (ref 78–100)
MONOCYTES ABSOLUTE: 0.3 K/CU MM
MONOCYTES RELATIVE PERCENT: 7.6 % (ref 0–4)
NUCLEATED RBC %: 0 %
OPIATES, URINE: NEGATIVE
OXYCODONE: ABNORMAL
PDW BLD-RTO: 14.5 % (ref 11.7–14.9)
PHENCYCLIDINE, URINE: NEGATIVE
PLATELET # BLD: 233 K/CU MM (ref 140–440)
PMV BLD AUTO: 9.9 FL (ref 7.5–11.1)
POTASSIUM SERPL-SCNC: 4.8 MMOL/L (ref 3.5–5.1)
RBC # BLD: 3.85 M/CU MM (ref 4.2–5.4)
SEGMENTED NEUTROPHILS ABSOLUTE COUNT: 2 K/CU MM
SEGMENTED NEUTROPHILS RELATIVE PERCENT: 46.8 % (ref 36–66)
SODIUM BLD-SCNC: 141 MMOL/L (ref 135–145)
SPECIMEN: NORMAL
TOTAL IMMATURE NEUTOROPHIL: 0 K/CU MM
TOTAL NUCLEATED RBC: 0 K/CU MM
TOTAL PROTEIN: 6.1 GM/DL (ref 6.4–8.2)
WBC # BLD: 4.4 K/CU MM (ref 4–10.5)

## 2023-04-03 PROCEDURE — 2580000003 HC RX 258

## 2023-04-03 PROCEDURE — G0378 HOSPITAL OBSERVATION PER HR: HCPCS

## 2023-04-03 PROCEDURE — 85025 COMPLETE CBC W/AUTO DIFF WBC: CPT

## 2023-04-03 PROCEDURE — C9113 INJ PANTOPRAZOLE SODIUM, VIA: HCPCS

## 2023-04-03 PROCEDURE — 80307 DRUG TEST PRSMV CHEM ANLYZR: CPT

## 2023-04-03 PROCEDURE — 87086 URINE CULTURE/COLONY COUNT: CPT

## 2023-04-03 PROCEDURE — 6360000002 HC RX W HCPCS: Performed by: STUDENT IN AN ORGANIZED HEALTH CARE EDUCATION/TRAINING PROGRAM

## 2023-04-03 PROCEDURE — 80053 COMPREHEN METABOLIC PANEL: CPT

## 2023-04-03 PROCEDURE — 94761 N-INVAS EAR/PLS OXIMETRY MLT: CPT

## 2023-04-03 PROCEDURE — A4216 STERILE WATER/SALINE, 10 ML: HCPCS

## 2023-04-03 PROCEDURE — 6360000002 HC RX W HCPCS

## 2023-04-03 PROCEDURE — 96372 THER/PROPH/DIAG INJ SC/IM: CPT

## 2023-04-03 PROCEDURE — 93005 ELECTROCARDIOGRAM TRACING: CPT | Performed by: STUDENT IN AN ORGANIZED HEALTH CARE EDUCATION/TRAINING PROGRAM

## 2023-04-03 PROCEDURE — 96366 THER/PROPH/DIAG IV INF ADDON: CPT

## 2023-04-03 PROCEDURE — 93010 ELECTROCARDIOGRAM REPORT: CPT | Performed by: INTERNAL MEDICINE

## 2023-04-03 PROCEDURE — 6370000000 HC RX 637 (ALT 250 FOR IP)

## 2023-04-03 PROCEDURE — 96376 TX/PRO/DX INJ SAME DRUG ADON: CPT

## 2023-04-03 PROCEDURE — 93306 TTE W/DOPPLER COMPLETE: CPT

## 2023-04-03 RX ORDER — PROMETHAZINE HYDROCHLORIDE 25 MG/1
25 TABLET ORAL 3 TIMES DAILY PRN
Qty: 12 TABLET | Refills: 0 | Status: SHIPPED | OUTPATIENT
Start: 2023-04-03 | End: 2023-04-10

## 2023-04-03 RX ORDER — HEPARIN SODIUM (PORCINE) LOCK FLUSH IV SOLN 100 UNIT/ML 100 UNIT/ML
100 SOLUTION INTRAVENOUS PRN
Status: DISCONTINUED | OUTPATIENT
Start: 2023-04-03 | End: 2023-04-03 | Stop reason: HOSPADM

## 2023-04-03 RX ORDER — SODIUM CHLORIDE 9 MG/ML
INJECTION INTRAVENOUS
Status: COMPLETED
Start: 2023-04-03 | End: 2023-04-03

## 2023-04-03 RX ADMIN — LEVETIRACETAM 1000 MG: 100 INJECTION, SOLUTION INTRAVENOUS at 09:09

## 2023-04-03 RX ADMIN — ONDANSETRON 4 MG: 2 INJECTION INTRAMUSCULAR; INTRAVENOUS at 05:42

## 2023-04-03 RX ADMIN — SULFASALAZINE 500 MG: 500 TABLET ORAL at 09:20

## 2023-04-03 RX ADMIN — SODIUM CHLORIDE, PRESERVATIVE FREE 10 ML: 5 INJECTION INTRAVENOUS at 09:09

## 2023-04-03 RX ADMIN — OXYCODONE HYDROCHLORIDE AND ACETAMINOPHEN 1 TABLET: 5; 325 TABLET ORAL at 15:34

## 2023-04-03 RX ADMIN — HYDROMORPHONE HYDROCHLORIDE 0.25 MG: 1 INJECTION, SOLUTION INTRAMUSCULAR; INTRAVENOUS; SUBCUTANEOUS at 11:47

## 2023-04-03 RX ADMIN — Medication 100 UNITS: at 16:09

## 2023-04-03 RX ADMIN — PANTOPRAZOLE SODIUM 40 MG: 40 INJECTION, POWDER, FOR SOLUTION INTRAVENOUS at 09:10

## 2023-04-03 RX ADMIN — AMOXICILLIN AND CLAVULANATE POTASSIUM 1 TABLET: 875; 125 TABLET, FILM COATED ORAL at 09:20

## 2023-04-03 RX ADMIN — LEVOTHYROXINE SODIUM 125 MCG: 0.12 TABLET ORAL at 05:42

## 2023-04-03 RX ADMIN — OXYCODONE HYDROCHLORIDE AND ACETAMINOPHEN 1 TABLET: 5; 325 TABLET ORAL at 09:19

## 2023-04-03 RX ADMIN — SODIUM CHLORIDE 10 ML: 9 INJECTION INTRAMUSCULAR; INTRAVENOUS; SUBCUTANEOUS at 09:10

## 2023-04-03 RX ADMIN — OXYCODONE HYDROCHLORIDE AND ACETAMINOPHEN 1 TABLET: 5; 325 TABLET ORAL at 02:29

## 2023-04-03 RX ADMIN — ONDANSETRON 4 MG: 2 INJECTION INTRAMUSCULAR; INTRAVENOUS at 11:47

## 2023-04-03 RX ADMIN — ENOXAPARIN SODIUM 30 MG: 100 INJECTION SUBCUTANEOUS at 09:10

## 2023-04-03 ASSESSMENT — ENCOUNTER SYMPTOMS
NAUSEA: 0
COLOR CHANGE: 0
EYE REDNESS: 0
VOMITING: 0
SHORTNESS OF BREATH: 0
BACK PAIN: 0
PHOTOPHOBIA: 0
WHEEZING: 0
FACIAL SWELLING: 0
COUGH: 0
STRIDOR: 0
ABDOMINAL PAIN: 0
EYE PAIN: 0

## 2023-04-03 ASSESSMENT — PAIN SCALES - GENERAL
PAINLEVEL_OUTOF10: 7
PAINLEVEL_OUTOF10: 5
PAINLEVEL_OUTOF10: 7
PAINLEVEL_OUTOF10: 7

## 2023-04-03 ASSESSMENT — PAIN DESCRIPTION - LOCATION
LOCATION: ABDOMEN
LOCATION: ABDOMEN;KNEE
LOCATION: ABDOMEN
LOCATION: ABDOMEN

## 2023-04-03 ASSESSMENT — PAIN - FUNCTIONAL ASSESSMENT: PAIN_FUNCTIONAL_ASSESSMENT: ACTIVITIES ARE NOT PREVENTED

## 2023-04-03 ASSESSMENT — PAIN DESCRIPTION - ORIENTATION: ORIENTATION: LOWER

## 2023-04-03 ASSESSMENT — PAIN DESCRIPTION - DESCRIPTORS: DESCRIPTORS: ACHING

## 2023-04-03 NOTE — PROGRESS NOTES
4 Eyes Skin Assessment     NAME:  Andre Pope  YOB: 1970  MEDICAL RECORD NUMBER:  [id number]    The patient is being assessed for  Admission    I agree that One RN has performed a thorough Head to Toe Skin Assessment on the patient. ALL assessment sites listed below have been assessed. Areas assessed by both nurses:    Head, Face, Ears, Shoulders, Back, Chest, Arms, Elbows, Hands, Sacrum. Buttock, Coccyx, Ischium, and Legs. Feet and Heels        Does the Patient have a Wound?  No noted wound(s)       Colin Prevention initiated by RN: No   Wound Care Orders initiated by RN: No    Pressure Injury (Stage 3,4, Unstageable, DTI, NWPT, and Complex wounds) if present, place referral order by RN under : No    New and Established Ostomies, if present place, referral order under : No      Nurse 1 eSignature: Electronically signed by Darek Qureshi LPN on 7/7/98 at 7:59 PM EDT    **SHARE this note so that the co-signing nurse can place an eSignature**    Nurse 2 eSignature: Electronically signed by Eliseo Upton RN on 4/1/23 at 3:56 PM EDT
Neurological consult has been canceled
Patient arrived to unit from ED, oriented to room and staff, bedside table and call light in reach. Patient in no apparent distress.
Patient up independent in room and taking shower, in no acute distress.
Pt discharge instructions explained and pt verbalized understanding. Pt given a copy. Pt escorted off unit to private vehicle driven by mother.
Upon entering room patient is sleeping and in no apparent distress, I had to wake her to take her vitals at which time she started crying saying she needs IV Dilaudid for her pain and IV Phenergan for her nausea, PS sent to provider and IV Phenergan was approved.
problems. *    Blood pressure 128/77, pulse 63, temperature 97.9 °F (36.6 °C), temperature source Oral, resp. rate 16, height 5' 4\" (1.626 m), weight 234 lb (106.1 kg), SpO2 97 %, not currently breastfeeding. Subjective  Patient seen and examined, admitted on this occasion due to abdominal pain. She is well-known to the orthopedic service with chronic left knee pain, historically she did have a septic left knee however recently she has been treated for arthritic effusions in the left knee. She is currently complaining of continued deep, aching and throbbing pain primarily on the posterior aspect of left knee as well as stiffness with extension of the left knee. She was scheduled to see Dr. Mark Martin next week. Objective  Left knee-well-healed surgical incisions, no redness, no signs of infection, mild pain with range of motion of left knee. Range of motion 5-120  She is able to bear weight fully on the left lower extremity with only mild pain. Minimal knee effusion. Lab Results   Component Value Date    WBC 3.8 (L) 04/02/2023    HGB 10.0 (L) 04/02/2023    HCT 33.3 (L) 04/02/2023    MCV 87.2 04/02/2023     04/02/2023     Assessment & Plan  Left knee arthritic effusion, no signs of septic arthritis. There is certainly no urgent treatment needed for her left knee. She can keep her outpatient follow-up when she is discharged from the hospital as this is not related to her current inpatient admission. Continue weight-bearing as tolerated. Continue range of motion exercises as instructed. Ice and elevate as needed. Tylenol or Motrin for pain. Follow up in office upon discharge.     Dorota Griffith, DO  4/2/2023
as reasonably achievable. Noncontrast CT of the head with reconstructed 2-D images are also provided for review. This scan was analyzed using Viz. ai contact LVO. Identification of suspected findings is not for diagnostic use beyond notification. Viz LVO is limited to analysis of imaging data and should not be used in-lieu of full patient evaluation or relied upon to make or confirm diagnosis. COMPARISON: None. HISTORY: ORDERING SYSTEM PROVIDED HISTORY: Stroke Symptoms FINDINGS: CT HEAD: BRAIN/VENTRICLES:  No acute intracranial hemorrhage or extraaxial fluid collection. Grey-white differentiation is maintained. No evidence of mass, mass effect or midline shift. No evidence of hydrocephalus. ORBITS: The visualized portion of the orbits demonstrate no acute abnormality. SINUSES:  The visualized paranasal sinuses and mastoid air cells demonstrate no acute abnormality. SOFT TISSUES/SKULL: No acute abnormality of the visualized skull or soft tissues. CTA NECK: AORTIC ARCH/ARCH VESSELS: No dissection or arterial injury. No significant stenosis of the brachiocephalic or subclavian arteries. CAROTID ARTERIES: No dissection, arterial injury, or hemodynamically significant stenosis by NASCET criteria. VERTEBRAL ARTERIES: No dissection, arterial injury, or significant stenosis. SOFT TISSUES: The lung apices are clear. No cervical or superior mediastinal lymphadenopathy. The larynx and pharynx are unremarkable. No acute abnormality of the salivary and thyroid glands. Partially imaged left chest wall subclavian approach Medi[employer]. BONES: No acute osseous abnormality. CTA HEAD: ANTERIOR CIRCULATION: No significant stenosis of the intracranial internal carotid, anterior cerebral, or middle cerebral arteries. No aneurysm. 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   No significant stenosis of the vertebral,
discharge. Left eye: No discharge. Extraocular Movements: Extraocular movements intact. Cardiovascular:      Pulses: Normal pulses. Pulmonary:      Effort: Pulmonary effort is normal.      Breath sounds: Normal breath sounds. Musculoskeletal:         General: Swelling and tenderness present. No signs of injury. Right shoulder: Normal.      Left shoulder: Normal.      Right upper arm: Normal.      Left upper arm: Normal.      Right elbow: Normal.      Left elbow: Normal.      Right forearm: Normal.      Left forearm: Normal.      Right wrist: Normal.      Left wrist: Normal.      Right hand: Normal.      Left hand: Tenderness present. No swelling, deformity, lacerations or bony tenderness. Decreased range of motion. Normal strength. Normal sensation. There is no disruption of two-point discrimination. Normal capillary refill. Normal pulse. Cervical back: Normal range of motion. Right hip: Normal.      Left hip: Normal.      Right upper leg: Normal.      Left upper leg: Normal.      Left knee: Bony tenderness and crepitus present. No swelling, deformity, effusion, erythema, ecchymosis or lacerations. Normal range of motion. Tenderness present over the medial joint line. No lateral joint line tenderness. No LCL laxity, MCL laxity, ACL laxity or PCL laxity. Normal alignment, normal meniscus and normal patellar mobility. Normal pulse. Instability Tests: Anterior drawer test negative. Posterior drawer test negative. Anterior Lachman test negative. Right lower leg: Normal. No edema. Left lower leg: Normal. No edema. Right ankle: Normal.      Left ankle: Normal.      Right foot: Normal.      Left foot: Normal.   Skin:     General: Skin is warm and dry. Capillary Refill: Capillary refill takes less than 2 seconds. Neurological:      General: No focal deficit present. Mental Status: She is alert and oriented to person, place, and time.  Mental status is at
a licensed caregiver, received by Peter Patricio NP at 10:56 am.     DEXA BONE DENSITY AXIAL SKELETON    Result Date: 3/7/2023  EXAMINATION: BONE DENSITOMETRY 3/7/2023 1:13 pm TECHNIQUE: A bone density dual x-ray absorptiometry (DXA) scan was performed of the lumbar spine, left hip and left forearm on a [employer] system. COMPARISON: None. HISTORY: ORDERING SYSTEM PROVIDED HISTORY: Rheumatoid arthritis involving multiple sites with positive rheumatoid factor ([address] TECHNOLOGIST PROVIDED HISTORY: Reason for exam:->evaluate for osteoporosis Is the patient pregnant?->No Gender: F Age: 44 y/o FINDINGS: LUMBAR SPINE: L1-L2 BMD: 0.931 g/cm2 T-score: -1.9 Z-score: -2.4 LEFT TOTAL HIP: BMD: 0.875 g/cm2 T-score: -1.1 Z-score: -1.3 LEFT FEMORAL NECK: BMD: 0.856 g/cm2 T-score: -1.3 Z-score: -1.1 LEFT FOREARM (RADIUS 1/3) BMD: 0.766 g/cm2 T-score: -1.3 Z-score: -0.9 FRAX: 10-year fracture risk is performed using the [employer] FRAX calculator based on patient-reported risk factors. Major osteoporotic fracture: 10.5% Hip fracture: 0.7% Other situations known to alter the reliability of the FRAX score should be considered when making treatment decisions, including chronic glucocorticoid use and past treatments. Further guidance on treatment can be found at the [facility] Osteoporosis Foundation's website bonesource.org.     Osteopenia by WHO criteria. RECOMMENDATIONS: 1. All patients should optimize their calcium and vitamin D intake.  2. Consider FDA-approved medical therapies in postmenopausal women and men aged 48 years and older, based on the following: - A hip or vertebral (clinical or morphometric) fracture - T-score less than or equal to -2.5 at the femoral neck or spine after appropriate evaluation to exclude secondary causes - Low bone density (T-score between -1.0 and -2.5 at the femoral neck or spine) and a 10-year probability of a hip fracture greater than or equal to 3% or a 10-year probability of a
10:54 am on 4/1/2023 to be communicated to a licensed caregiver, received by Silvia Cline NP at 10:56 am.     VL DUP LOWER EXTREMITY VENOUS LEFT    Result Date: 4/1/2023  EXAMINATION: DUPLEX VENOUS ULTRASOUND OF THE LEFT LOWER EXTREMITY 4/1/2023 8:50 am TECHNIQUE: Duplex ultrasound using B-mode/gray scaled imaging and Doppler spectral analysis and color flow was obtained of the deep venous structures of the left extremity. COMPARISON: None. HISTORY: ORDERING SYSTEM PROVIDED HISTORY: left leg pain TECHNOLOGIST PROVIDED HISTORY: Reason for exam:->left leg pain FINDINGS: The visualized veins of the left lower extremity are patent and free of echogenic thrombus. The veins demonstrate good compressibility with normal color flow study and spectral analysis. Incidentally noted is a suprapatellar fluid collection in the soft tissues suggesting prepatellar bursitis or liquefying hematoma     No evidence of DVT in the left lower extremity.        Electronically signed by Gino Segura MD on 4/2/2023 at 8:22 AM

## 2023-04-04 ENCOUNTER — TELEPHONE (OUTPATIENT)
Dept: INTERNAL MEDICINE CLINIC | Age: 53
End: 2023-04-04

## 2023-04-04 ENCOUNTER — OFFICE VISIT (OUTPATIENT)
Dept: RHEUMATOLOGY | Age: 53
End: 2023-04-04
Payer: COMMERCIAL

## 2023-04-04 VITALS
SYSTOLIC BLOOD PRESSURE: 120 MMHG | BODY MASS INDEX: 41.66 KG/M2 | OXYGEN SATURATION: 99 % | DIASTOLIC BLOOD PRESSURE: 80 MMHG | HEIGHT: 64 IN | HEART RATE: 78 BPM | WEIGHT: 244 LBS

## 2023-04-04 DIAGNOSIS — L40.9 PSORIASIS: ICD-10-CM

## 2023-04-04 DIAGNOSIS — E66.01 MORBID OBESITY WITH BMI OF 40.0-44.9, ADULT (HCC): ICD-10-CM

## 2023-04-04 DIAGNOSIS — D50.9 IRON DEFICIENCY ANEMIA, UNSPECIFIED IRON DEFICIENCY ANEMIA TYPE: Primary | ICD-10-CM

## 2023-04-04 DIAGNOSIS — R89.4 SEROLOGIC ABNORMALITY: ICD-10-CM

## 2023-04-04 DIAGNOSIS — Z71.6 ENCOUNTER FOR TOBACCO USE CESSATION COUNSELING: ICD-10-CM

## 2023-04-04 DIAGNOSIS — K90.9 INTESTINAL MALABSORPTION, UNSPECIFIED TYPE: ICD-10-CM

## 2023-04-04 DIAGNOSIS — E03.4 HYPOTHYROIDISM DUE TO ACQUIRED ATROPHY OF THYROID: ICD-10-CM

## 2023-04-04 DIAGNOSIS — E66.01 CLASS 3 SEVERE OBESITY WITH BODY MASS INDEX (BMI) OF 40.0 TO 44.9 IN ADULT, UNSPECIFIED OBESITY TYPE, UNSPECIFIED WHETHER SERIOUS COMORBIDITY PRESENT (HCC): ICD-10-CM

## 2023-04-04 DIAGNOSIS — R74.8 ELEVATED ALKALINE PHOSPHATASE LEVEL: ICD-10-CM

## 2023-04-04 DIAGNOSIS — M05.79 RHEUMATOID ARTHRITIS INVOLVING MULTIPLE SITES WITH POSITIVE RHEUMATOID FACTOR (HCC): Primary | ICD-10-CM

## 2023-04-04 DIAGNOSIS — K90.49 MALABSORPTION DUE TO INTOLERANCE, NOT ELSEWHERE CLASSIFIED: ICD-10-CM

## 2023-04-04 DIAGNOSIS — Z79.899 HIGH RISK MEDICATION USE: ICD-10-CM

## 2023-04-04 LAB
CULTURE: NORMAL
Lab: NORMAL
SPECIMEN: NORMAL

## 2023-04-04 PROCEDURE — 3074F SYST BP LT 130 MM HG: CPT | Performed by: STUDENT IN AN ORGANIZED HEALTH CARE EDUCATION/TRAINING PROGRAM

## 2023-04-04 PROCEDURE — G8417 CALC BMI ABV UP PARAM F/U: HCPCS | Performed by: STUDENT IN AN ORGANIZED HEALTH CARE EDUCATION/TRAINING PROGRAM

## 2023-04-04 PROCEDURE — 3017F COLORECTAL CA SCREEN DOC REV: CPT | Performed by: STUDENT IN AN ORGANIZED HEALTH CARE EDUCATION/TRAINING PROGRAM

## 2023-04-04 PROCEDURE — 99215 OFFICE O/P EST HI 40 MIN: CPT | Performed by: STUDENT IN AN ORGANIZED HEALTH CARE EDUCATION/TRAINING PROGRAM

## 2023-04-04 PROCEDURE — G8427 DOCREV CUR MEDS BY ELIG CLIN: HCPCS | Performed by: STUDENT IN AN ORGANIZED HEALTH CARE EDUCATION/TRAINING PROGRAM

## 2023-04-04 PROCEDURE — 1036F TOBACCO NON-USER: CPT | Performed by: STUDENT IN AN ORGANIZED HEALTH CARE EDUCATION/TRAINING PROGRAM

## 2023-04-04 PROCEDURE — 3079F DIAST BP 80-89 MM HG: CPT | Performed by: STUDENT IN AN ORGANIZED HEALTH CARE EDUCATION/TRAINING PROGRAM

## 2023-04-04 RX ORDER — SULFASALAZINE 500 MG/1
1000 TABLET ORAL 2 TIMES DAILY
Qty: 180 TABLET | Refills: 0 | Status: SHIPPED | OUTPATIENT
Start: 2023-04-04

## 2023-04-04 NOTE — TELEPHONE ENCOUNTER
Care Transitions Initial Follow Up Call    Outreach made within 2 business days of discharge: Yes    Patient: Tianna Rodriguez Patient : 1970   MRN: [id number]  Reason for Admission: There are no discharge diagnoses documented for the most recent discharge. Discharge Date: 4/3/23       Spoke with: Andre    Discharge department/facility: [facility]    TCM Interactive Patient Contact:  Was patient able to fill all prescriptions: Yes  Was patient instructed to bring all medications to the follow-up visit: Yes  Is patient taking all medications as directed in the discharge summary?  Yes  Does patient understand their discharge instructions: Yes  Does patient have questions or concerns that need addressed prior to 7-14 day follow up office visit: no    She has several appointments scheduled with other providers but I got her scheduled here for 23 at 1:40    Scheduled appointment with PCP within 7-14 days    Follow Up  Future Appointments   Date Time Provider Radny Monzon   2023  3:00 PM Jefferson Elena MD [address] [facility]   2023  1:40 PM Justin Sanchez MD [facility]   2023  2:15 PM SCHEDULE, [facility] MED ONC LAB [facility] MED ONC [place]   2023  2:00 PM [facility], MED ONC NURSE [facility] MED ONC [place]   2023  2:15 PM Honora Saint, MD [address] [facility]   2023  1:00 PM MD Aliyah Watson [address] [place] [id number]

## 2023-04-05 ENCOUNTER — TELEPHONE (OUTPATIENT)
Dept: INTERNAL MEDICINE CLINIC | Age: 53
End: 2023-04-05

## 2023-04-06 LAB
CULTURE: NORMAL
Lab: NORMAL
SPECIMEN: NORMAL

## 2023-04-07 ENCOUNTER — TELEPHONE (OUTPATIENT)
Dept: BARIATRICS/WEIGHT MGMT | Age: 53
End: 2023-04-07

## 2023-04-07 LAB
CULTURE: NORMAL
Lab: NORMAL
SPECIMEN: NORMAL

## 2023-04-14 ENCOUNTER — HOSPITAL ENCOUNTER (OUTPATIENT)
Dept: INFUSION THERAPY | Age: 53
Discharge: HOME | End: 2023-04-14
Payer: COMMERCIAL

## 2023-04-14 DIAGNOSIS — K90.49 MALABSORPTION DUE TO INTOLERANCE, NOT ELSEWHERE CLASSIFIED: ICD-10-CM

## 2023-04-14 DIAGNOSIS — D50.9 IRON DEFICIENCY ANEMIA, UNSPECIFIED IRON DEFICIENCY ANEMIA TYPE: ICD-10-CM

## 2023-04-14 DIAGNOSIS — K90.9 INTESTINAL MALABSORPTION, UNSPECIFIED TYPE: ICD-10-CM

## 2023-04-14 DIAGNOSIS — E66.01 MORBID OBESITY WITH BMI OF 40.0-44.9, ADULT (HCC): ICD-10-CM

## 2023-04-14 DIAGNOSIS — E03.4 HYPOTHYROIDISM DUE TO ACQUIRED ATROPHY OF THYROID: ICD-10-CM

## 2023-04-14 LAB
ALBUMIN SERPL-MCNC: 4.2 GM/DL (ref 3.4–5)
ALP BLD-CCNC: 131 IU/L (ref 40–129)
ALT SERPL-CCNC: 11 U/L (ref 10–40)
ANION GAP SERPL CALCULATED.3IONS-SCNC: 12 MMOL/L (ref 4–16)
AST SERPL-CCNC: 15 IU/L (ref 15–37)
BASOPHILS ABSOLUTE: 0 K/CU MM
BASOPHILS RELATIVE PERCENT: 0.5 % (ref 0–1)
BILIRUB SERPL-MCNC: 0.2 MG/DL (ref 0–1)
BUN SERPL-MCNC: 14 MG/DL (ref 6–23)
CALCIUM SERPL-MCNC: 10.3 MG/DL (ref 8.3–10.6)
CHLORIDE BLD-SCNC: 108 MMOL/L (ref 99–110)
CO2: 20 MMOL/L (ref 21–32)
CREAT SERPL-MCNC: 0.7 MG/DL (ref 0.6–1.1)
DIFFERENTIAL TYPE: ABNORMAL
EOSINOPHILS ABSOLUTE: 0.2 K/CU MM
EOSINOPHILS RELATIVE PERCENT: 5.6 % (ref 0–3)
GFR SERPL CREATININE-BSD FRML MDRD: >60 ML/MIN/1.73M2
GLUCOSE SERPL-MCNC: 85 MG/DL (ref 70–99)
HCT VFR BLD CALC: 36 % (ref 37–47)
HEMOGLOBIN: 11.2 GM/DL (ref 12.5–16)
LYMPHOCYTES ABSOLUTE: 1.6 K/CU MM
LYMPHOCYTES RELATIVE PERCENT: 40.8 % (ref 24–44)
MCH RBC QN AUTO: 26.4 PG (ref 27–31)
MCHC RBC AUTO-ENTMCNC: 31.1 % (ref 32–36)
MCV RBC AUTO: 84.9 FL (ref 78–100)
MONOCYTES ABSOLUTE: 0.3 K/CU MM
MONOCYTES RELATIVE PERCENT: 8.7 % (ref 0–4)
PDW BLD-RTO: 15.1 % (ref 11.7–14.9)
PLATELET # BLD: 225 K/CU MM (ref 140–440)
PMV BLD AUTO: 9.8 FL (ref 7.5–11.1)
POTASSIUM SERPL-SCNC: 4.9 MMOL/L (ref 3.5–5.1)
RBC # BLD: 4.24 M/CU MM (ref 4.2–5.4)
SEGMENTED NEUTROPHILS ABSOLUTE COUNT: 1.7 K/CU MM
SEGMENTED NEUTROPHILS RELATIVE PERCENT: 44.4 % (ref 36–66)
SODIUM BLD-SCNC: 140 MMOL/L (ref 135–145)
T4 FREE SERPL-MCNC: 1 NG/DL (ref 0.9–1.8)
TOTAL PROTEIN: 6.4 GM/DL (ref 6.4–8.2)
TSH SERPL DL<=0.005 MIU/L-ACNC: 12.73 UIU/ML (ref 0.27–4.2)
WBC # BLD: 3.9 K/CU MM (ref 4–10.5)

## 2023-04-14 PROCEDURE — 84439 ASSAY OF FREE THYROXINE: CPT

## 2023-04-14 PROCEDURE — 80053 COMPREHEN METABOLIC PANEL: CPT

## 2023-04-14 PROCEDURE — 85025 COMPLETE CBC W/AUTO DIFF WBC: CPT

## 2023-04-14 PROCEDURE — 84443 ASSAY THYROID STIM HORMONE: CPT

## 2023-04-14 PROCEDURE — 36415 COLL VENOUS BLD VENIPUNCTURE: CPT

## 2023-04-20 ENCOUNTER — TELEPHONE (OUTPATIENT)
Dept: GASTROENTEROLOGY | Age: 53
End: 2023-04-20

## 2023-04-20 NOTE — TELEPHONE ENCOUNTER
Called pt. In regards to a referral for N&V and epigastric pain. LM for pt to call back to make an appt.

## 2023-04-24 ENCOUNTER — TELEPHONE (OUTPATIENT)
Dept: RHEUMATOLOGY | Age: 53
End: 2023-04-24

## 2023-04-24 NOTE — TELEPHONE ENCOUNTER
Patient states that she still has nausea and body pain and swelling in hands and feet.  She will continue the medication and follow up next month

## 2023-04-24 NOTE — TELEPHONE ENCOUNTER
She did mention that she wanted to keep taking it and give it time.  She will discuss at next appointment

## 2023-04-26 ENCOUNTER — OFFICE VISIT (OUTPATIENT)
Dept: ORTHOPEDIC SURGERY | Age: 53
End: 2023-04-26
Payer: COMMERCIAL

## 2023-04-26 VITALS
BODY MASS INDEX: 40.99 KG/M2 | OXYGEN SATURATION: 99 % | WEIGHT: 240.1 LBS | TEMPERATURE: 98.7 F | HEIGHT: 64 IN | HEART RATE: 88 BPM

## 2023-04-26 DIAGNOSIS — M54.10 RADICULOPATHY OF LEG: Primary | ICD-10-CM

## 2023-04-26 DIAGNOSIS — F32.A CHRONIC DEPRESSION: ICD-10-CM

## 2023-04-26 PROCEDURE — 3017F COLORECTAL CA SCREEN DOC REV: CPT | Performed by: STUDENT IN AN ORGANIZED HEALTH CARE EDUCATION/TRAINING PROGRAM

## 2023-04-26 PROCEDURE — 1036F TOBACCO NON-USER: CPT | Performed by: STUDENT IN AN ORGANIZED HEALTH CARE EDUCATION/TRAINING PROGRAM

## 2023-04-26 PROCEDURE — G8417 CALC BMI ABV UP PARAM F/U: HCPCS | Performed by: STUDENT IN AN ORGANIZED HEALTH CARE EDUCATION/TRAINING PROGRAM

## 2023-04-26 PROCEDURE — G8427 DOCREV CUR MEDS BY ELIG CLIN: HCPCS | Performed by: STUDENT IN AN ORGANIZED HEALTH CARE EDUCATION/TRAINING PROGRAM

## 2023-04-26 PROCEDURE — 99213 OFFICE O/P EST LOW 20 MIN: CPT | Performed by: STUDENT IN AN ORGANIZED HEALTH CARE EDUCATION/TRAINING PROGRAM

## 2023-04-26 RX ORDER — DULOXETIN HYDROCHLORIDE 30 MG/1
30 CAPSULE, DELAYED RELEASE ORAL DAILY
Qty: 7 CAPSULE | Refills: 0 | Status: SHIPPED | OUTPATIENT
Start: 2023-04-26 | End: 2023-05-03

## 2023-04-26 RX ORDER — DULOXETIN HYDROCHLORIDE 60 MG/1
60 CAPSULE, DELAYED RELEASE ORAL DAILY
Qty: 30 CAPSULE | Refills: 0 | Status: SHIPPED | OUTPATIENT
Start: 2023-04-26

## 2023-04-26 ASSESSMENT — ENCOUNTER SYMPTOMS
VOMITING: 0
COUGH: 0
BACK PAIN: 0
EYE REDNESS: 0
SHORTNESS OF BREATH: 0
EYE PAIN: 0
STRIDOR: 0
PHOTOPHOBIA: 0
COLOR CHANGE: 0
NAUSEA: 0
ABDOMINAL PAIN: 0
WHEEZING: 0
FACIAL SWELLING: 0

## 2023-04-26 NOTE — PROGRESS NOTES
Patient seen in office today for:  Left knee pain    DOI:  n/a  DOS:  9/20/22  Date of last injection: n/a     Patient denies decreased ROM. Patient reports 8/10 pain. RICE and medication are not effective to alleviate pain and reduce swelling. Patient completed physical therapy but was not effective.      Profession:  Disabled
BY MOUTH EVERY 4 TO 6 HOURS AS NEEDED FOR PAIN      dicyclomine (BENTYL) 20 MG tablet Take 1 tablet by mouth 4 times daily as needed (Abdominal pain) 20 tablet 0    pantoprazole (PROTONIX) 40 MG tablet Take 1 tablet by mouth twice daily 180 tablet 0    levETIRAcetam (KEPPRA) 1000 MG tablet Take 1 tablet by mouth twice daily 60 tablet 3    Melatonin 10 MG TABS Take 10-20 mg by mouth nightly as needed      tiZANidine (ZANAFLEX) 4 MG tablet Take 1 tablet by mouth every 8 hours as needed      Cholecalciferol (VITAMIN D3) 5000 units TABS Take 1 tablet by mouth daily      sucralfate (CARAFATE) 1 GM/10ML suspension Take 10 mLs by mouth 4 times daily for 21 days (Patient not taking: No sig reported) 840 mL 0    gabapentin (NEURONTIN) 800 MG tablet Take 1 tablet by mouth 3 times daily for 4 days. (Patient not taking: Reported on 4/3/2023) 12 tablet 0    albuterol (PROVENTIL) (2.5 MG/3ML) 0.083% nebulizer solution Take 3 mLs by nebulization every 6 hours as needed for Wheezing (Patient not taking: Reported on 3/6/2023) 120 each 0     No current facility-administered medications for this visit. Allergies   Allergen Reactions    Aspirin Palpitations     \"My Heart Rate Elevates\"    Compazine [Prochlorperazine] Rash    Shellfish-Derived Products Swelling    Toradol [Ketorolac Tromethamine] Rash    Haldol [Haloperidol] Other (See Comments)     States \"shook severely and had to have Benadryl\"    Asa [Aspirin]     Compazine [Prochlorperazine]     Haldol [Haloperidol]      Shaking      Reglan [Metoclopramide] Itching    Reglan [Metoclopramide]     Toradol [Ketorolac Tromethamine]          Review of Systems   Constitutional:  Negative for activity change, appetite change, chills, diaphoresis, fatigue, fever and unexpected weight change. HENT:  Negative for congestion, ear pain, facial swelling, hearing loss and sneezing. Eyes:  Negative for photophobia, pain and redness.    Respiratory:  Negative for cough, shortness of

## 2023-04-26 NOTE — TELEPHONE ENCOUNTER
Wants refills on anxiety medication that the Nurse Practitioner put her on. Did not remember  what medication was called cause it was the first time being prescribed.

## 2023-04-26 NOTE — PATIENT INSTRUCTIONS
Continue to weight bear as tolerated  Continue range of motion  Ice and elevate as needed  Tylenol or Motrin for pain  Spine xrays ordered. Call our office once xrays are completed to schedule a follow up as needed. We are committed to providing you the best care possible. If you receive a survey after visiting one of our offices, please take time to share your experience concerning your physician office visit. These surveys are confidential and no health information about you is shared. We are eager to improve for you and we are counting on your feedback to help make that happen.

## 2023-04-28 ENCOUNTER — TELEPHONE (OUTPATIENT)
Dept: ORTHOPEDIC SURGERY | Age: 53
End: 2023-04-28

## 2023-05-01 ENCOUNTER — HOSPITAL ENCOUNTER (OUTPATIENT)
Age: 53
Discharge: HOME | End: 2023-05-01
Payer: COMMERCIAL

## 2023-05-01 ENCOUNTER — HOSPITAL ENCOUNTER (OUTPATIENT)
Dept: GENERAL RADIOLOGY | Age: 53
Discharge: HOME | End: 2023-05-01
Payer: COMMERCIAL

## 2023-05-01 ENCOUNTER — TELEPHONE (OUTPATIENT)
Dept: ORTHOPEDIC SURGERY | Age: 53
End: 2023-05-01

## 2023-05-01 DIAGNOSIS — M79.662 PAIN AND SWELLING OF LEFT LOWER LEG: ICD-10-CM

## 2023-05-01 DIAGNOSIS — M79.89 PAIN AND SWELLING OF LEFT LOWER LEG: ICD-10-CM

## 2023-05-01 DIAGNOSIS — M54.10 RADICULOPATHY OF LEG: Primary | ICD-10-CM

## 2023-05-01 DIAGNOSIS — M54.10 RADICULOPATHY OF LEG: ICD-10-CM

## 2023-05-01 PROCEDURE — 72120 X-RAY BEND ONLY L-S SPINE: CPT

## 2023-05-01 PROCEDURE — 72110 X-RAY EXAM L-2 SPINE 4/>VWS: CPT

## 2023-05-01 NOTE — TELEPHONE ENCOUNTER
Patient called in stating that she is currently get her xray completed. Please be advised for the results.

## 2023-05-02 ENCOUNTER — TELEPHONE (OUTPATIENT)
Dept: ORTHOPEDIC SURGERY | Age: 53
End: 2023-05-02

## 2023-05-02 NOTE — TELEPHONE ENCOUNTER
I called the patient regarding her x-ray findings. Please have the patient scheduled to undergo a left lower extremity EMG concerning for radiculopathy.   Please have patient follow-up with me in office after EMG is completed to discuss the results and possible referral Thank you

## 2023-05-02 NOTE — TELEPHONE ENCOUNTER
I was able to contact the patient regarding her imaging of her lumbar spine. Although the results have not been posted, it does appear that she has stenosis around the S1 nerve. This does correlate with her symptoms including the S1 dermatome down the left leg that she is symptomatic. At this time we will proceed with an EMG of the left lower extremity. I will contact her office and have them place the order and patient can follow-up after EMG is completed to discuss results and possible referral to the pain management or spine/neuro surgery.

## 2023-05-09 DIAGNOSIS — R11.0 NAUSEA: ICD-10-CM

## 2023-05-09 DIAGNOSIS — R11.2 NAUSEA AND VOMITING, UNSPECIFIED VOMITING TYPE: ICD-10-CM

## 2023-05-09 RX ORDER — ONDANSETRON 4 MG/1
4 TABLET, ORALLY DISINTEGRATING ORAL 3 TIMES DAILY PRN
Qty: 30 TABLET | Refills: 1 | Status: SHIPPED | OUTPATIENT
Start: 2023-05-09

## 2023-05-09 NOTE — TELEPHONE ENCOUNTER
Patient says she has an up coming procedure and will need her Zofran refilled. She said [facility] on [address] but her pharmacy that is listed is saying [facility].

## 2023-05-16 ENCOUNTER — OFFICE VISIT (OUTPATIENT)
Dept: RHEUMATOLOGY | Age: 53
End: 2023-05-16
Payer: COMMERCIAL

## 2023-05-16 VITALS
WEIGHT: 248 LBS | HEART RATE: 84 BPM | SYSTOLIC BLOOD PRESSURE: 125 MMHG | OXYGEN SATURATION: 94 % | DIASTOLIC BLOOD PRESSURE: 80 MMHG | BODY MASS INDEX: 42.57 KG/M2

## 2023-05-16 DIAGNOSIS — Z79.899 ENCOUNTER FOR MONITORING OF HYDROXYCHLOROQUINE THERAPY: ICD-10-CM

## 2023-05-16 DIAGNOSIS — Z51.81 ENCOUNTER FOR MONITORING OF HYDROXYCHLOROQUINE THERAPY: ICD-10-CM

## 2023-05-16 DIAGNOSIS — R74.8 ELEVATED ALKALINE PHOSPHATASE LEVEL: ICD-10-CM

## 2023-05-16 DIAGNOSIS — Z79.899 HIGH RISK MEDICATION USE: ICD-10-CM

## 2023-05-16 DIAGNOSIS — M05.79 RHEUMATOID ARTHRITIS INVOLVING MULTIPLE SITES WITH POSITIVE RHEUMATOID FACTOR (HCC): Primary | ICD-10-CM

## 2023-05-16 DIAGNOSIS — L40.9 PSORIASIS: ICD-10-CM

## 2023-05-16 PROCEDURE — 3017F COLORECTAL CA SCREEN DOC REV: CPT | Performed by: STUDENT IN AN ORGANIZED HEALTH CARE EDUCATION/TRAINING PROGRAM

## 2023-05-16 PROCEDURE — 3074F SYST BP LT 130 MM HG: CPT | Performed by: STUDENT IN AN ORGANIZED HEALTH CARE EDUCATION/TRAINING PROGRAM

## 2023-05-16 PROCEDURE — 3079F DIAST BP 80-89 MM HG: CPT | Performed by: STUDENT IN AN ORGANIZED HEALTH CARE EDUCATION/TRAINING PROGRAM

## 2023-05-16 PROCEDURE — 1036F TOBACCO NON-USER: CPT | Performed by: STUDENT IN AN ORGANIZED HEALTH CARE EDUCATION/TRAINING PROGRAM

## 2023-05-16 PROCEDURE — 99215 OFFICE O/P EST HI 40 MIN: CPT | Performed by: STUDENT IN AN ORGANIZED HEALTH CARE EDUCATION/TRAINING PROGRAM

## 2023-05-16 PROCEDURE — G8427 DOCREV CUR MEDS BY ELIG CLIN: HCPCS | Performed by: STUDENT IN AN ORGANIZED HEALTH CARE EDUCATION/TRAINING PROGRAM

## 2023-05-16 PROCEDURE — G8417 CALC BMI ABV UP PARAM F/U: HCPCS | Performed by: STUDENT IN AN ORGANIZED HEALTH CARE EDUCATION/TRAINING PROGRAM

## 2023-05-16 RX ORDER — HYDROXYCHLOROQUINE SULFATE 200 MG/1
200 TABLET, FILM COATED ORAL 2 TIMES DAILY
Qty: 120 TABLET | Refills: 0 | Status: SHIPPED | OUTPATIENT
Start: 2023-05-16

## 2023-05-16 NOTE — PATIENT INSTRUCTIONS
Continue sulfasalazine 2 tabs twice daily   Start Plaquenil 1 tab twice daily   You were referred to eye clinic please schedule   RTC in 2 months   Get future labs on 7/3 prior to next visit

## 2023-05-16 NOTE — PROGRESS NOTES
organ     Fibromyalgia Dx 2013    GERD (gastroesophageal reflux disease)     H/O echocardiogram 08/11/2015    EF >55%. LA to be at the upper limit of normal in size. LV hypertrophy with normal LV systolic, but abnormal diastolic function. Normal valvular structures and function. H/O echocardiogram 08/30/2018    EF 55-60%    Hiatal hernia     History of blood transfusion 09/2015 And 2018    No Reaction To Blood Transfusions Received    History of sleeve gastrectomy     [place] (hard of hearing)     Right Ear    Hx of cardiac catheterization 10/24/2010    Angiographically normal coronary arteries w/ normal LV function and wall motion. Hx of transesophageal echocardiography (PILO) for monitoring 12/02/2010    EF 55-60%. WNL.     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 name    Hypertension     Immune deficiency disorder ([address]     Kidney cysts     \"they found that I have a kidney cyst but not sure which side\" per pt on 7/15/2020    Kidney stone     Lupus ([address] Dx 2013    \"Rheumatoid Lupus\"    MDRO (multiple drug resistant organisms) resistance     4 months ago chest from mediport    Morbid obesity ([address]     MRSA bacteremia     Nausea     \"Most Of The Time\"    Osteomyelitis of fifth toe of left foot (HCC)     Pain management     Sees Dr. Chel Mcneil, Once A Month    Pancreatitis chronic Dx 2001    Pneumonia Dx 11-15    Seizures ([address]     Shortness of breath on exertion     Shortness of breath on exertion     Sleep apnea     Has CPAP\"no longer use the cpap since lost weight\"    Staph infection Dx 11-15    Left Foot    Staph infection Dx 11-15    \"Left Foot\"    Teeth missing     Upper And Lower    Thyroid disease     Wears glasses     To

## 2023-05-17 ENCOUNTER — OFFICE VISIT (OUTPATIENT)
Dept: ORTHOPEDIC SURGERY | Age: 53
End: 2023-05-17

## 2023-05-17 VITALS — OXYGEN SATURATION: 97 % | SYSTOLIC BLOOD PRESSURE: 131 MMHG | DIASTOLIC BLOOD PRESSURE: 88 MMHG | HEART RATE: 74 BPM

## 2023-05-17 DIAGNOSIS — M54.16 LUMBAR RADICULOPATHY: Primary | ICD-10-CM

## 2023-05-17 ASSESSMENT — ENCOUNTER SYMPTOMS
VOMITING: 0
EYE PAIN: 0
STRIDOR: 0
NAUSEA: 0
COLOR CHANGE: 0
COUGH: 0
SHORTNESS OF BREATH: 0
PHOTOPHOBIA: 0
EYE REDNESS: 0
BACK PAIN: 0
ABDOMINAL PAIN: 0
FACIAL SWELLING: 0
WHEEZING: 0

## 2023-05-17 NOTE — PROGRESS NOTES
Patient is here for follow up on left knee. Patient was previously seen for pain and swelling in left knee. Referred for EMG for concern of spinal condition causing left knee pain. EMG scheduled in June. Left knee pain rated 5/10. Notes swelling behind her knee. Last injection for left knee 2/27/23  Left Suprolateral patetellar Aspiration Procedure Note   Pre-operative Diagnosis: Left knee effusion, low concern for septic joint   Post-operative Diagnosis: same   Indications: Rule out septic joint   Anesthesia: Lidocaine 1%    Procedure Details   Verbal consent was obtained for the procedure. The knee was prepped with alcohol scrub. A 18 gauge needle was advanced into the superolateral aspect of the knee into the superopatellar area without difficulty The space was then aspirated without difficulty and a total of 20 cc of clear, nonturbulent colored fluid was removed from the knee. A bandaid and compressive soft dressing was applied. Complications: None; patient tolerated the procedure well. Symptoms were improved immediately after the aspiration.
Hands, Back And Ankles    Arthritis     Bleeding ulcer 2014    \"I Had Ulcers In My Stomach And Colon\"/ per pt on 8/12/2019\"they said recently having some blood in my stomach- in July ( 2019)could not find where coming from \"    Bronchitis Last Episode 2014    Chronic back pain     Chronic pain     Sees Dr. Madhav Bolivar    COPD (chronic obstructive pulmonary disease) ([address]     Sees Dr. Wali Conner    Depression     Disease of blood and blood forming organ     Fibromyalgia Dx 2013    GERD (gastroesophageal reflux disease)     H/O echocardiogram 08/11/2015    EF >55%. LA to be at the upper limit of normal in size. LV hypertrophy with normal LV systolic, but abnormal diastolic function. Normal valvular structures and function. H/O echocardiogram 08/30/2018    EF 55-60%    Hiatal hernia     History of blood transfusion 09/2015 And 2018    No Reaction To Blood Transfusions Received    History of sleeve gastrectomy     [place] (hard of hearing)     Right Ear    Hx of cardiac catheterization 10/24/2010    Angiographically normal coronary arteries w/ normal LV function and wall motion. Hx of transesophageal echocardiography (PILO) for monitoring 12/02/2010    EF 55-60%. WNL.     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 name    Hypertension     Immune deficiency disorder [facility])     Kidney cysts     \"they found that I have a kidney cyst but not sure which side\" per pt on 7/15/2020    Kidney stone     Lupus ([address] Dx 2013    \"Rheumatoid Lupus\"    MDRO (multiple drug resistant organisms) resistance     4 months ago chest from mediport    Morbid obesity ([address]     MRSA bacteremia     Nausea     \"Most Of The Time\"    Osteomyelitis of

## 2023-05-22 ENCOUNTER — OFFICE VISIT (OUTPATIENT)
Dept: OBGYN | Age: 53
End: 2023-05-22
Payer: COMMERCIAL

## 2023-05-22 DIAGNOSIS — Z86.718 HISTORY OF DVT (DEEP VEIN THROMBOSIS): ICD-10-CM

## 2023-05-22 DIAGNOSIS — Z90.710 HISTORY OF TOTAL HYSTERECTOMY: ICD-10-CM

## 2023-05-22 DIAGNOSIS — M04.8 OTHER AUTOINFLAMMATORY SYNDROMES (HCC): ICD-10-CM

## 2023-05-22 DIAGNOSIS — Z80.3 FAMILY HISTORY OF BREAST CANCER: ICD-10-CM

## 2023-05-22 DIAGNOSIS — E66.01 MORBID OBESITY (HCC): ICD-10-CM

## 2023-05-22 DIAGNOSIS — Z01.419 WOMEN'S ANNUAL ROUTINE GYNECOLOGICAL EXAMINATION: Primary | ICD-10-CM

## 2023-05-22 DIAGNOSIS — N64.3 GALACTORRHEA: ICD-10-CM

## 2023-05-22 DIAGNOSIS — F41.9 ANXIETY: ICD-10-CM

## 2023-05-22 LAB
HCG SERPL QL: POSITIVE
TSH SERPL DL<=0.005 MIU/L-ACNC: 14.7 UIU/ML (ref 0.27–4.2)

## 2023-05-22 PROCEDURE — 99396 PREV VISIT EST AGE 40-64: CPT

## 2023-05-22 RX ORDER — HYDROXYZINE 50 MG/1
50 TABLET, FILM COATED ORAL 2 TIMES DAILY PRN
Qty: 30 TABLET | Refills: 0 | Status: SHIPPED | OUTPATIENT
Start: 2023-05-22 | End: 2023-05-24 | Stop reason: SDUPTHER

## 2023-05-22 ASSESSMENT — ENCOUNTER SYMPTOMS
RESPIRATORY NEGATIVE: 1
ABDOMINAL PAIN: 0
GASTROINTESTINAL NEGATIVE: 1

## 2023-05-22 NOTE — PROGRESS NOTES
23    Andre Pope  1970    Chief Complaint   Patient presents with    New Patient     Annual exam, hyster with BSO, on estradiol 0.5mg.,  not sexually active, mammo  negative, dexa unsure  osteopenia take vit d otc, colonoscopy  negative. Mom just dx. With breast cancer also maternal aunt  at 52 due to breast cancer . The patient is a 45 y.o. female,  who presents for her annual exam. She is menopausal.  She is taking HRT, estradiol 0.5mg. . She reports additional symptoms of occasional galactorrhea for years . She has had a hysterectomy with removal of ovaries. Pt reports history DVT, states she was diagnosed upon an initial hospitalization years ago and then upon discharge was told she did not have a DVT. Also family history of breast cancer in mother and maternal aunt. Pt reports occasional clear galactorrhea, has occurred from both breasts but predominantly L breast. She states she never  her children, noticed symptoms on and off since birth of her son just over 27 yrs ago. She is not sexually active. Pap smear history: Her last PAP smear was in unsure. Her results were normal.    Breast history: her most recent mammogram was in . The results were: Normal    Osteoporosis Status: her bone density scan was in . The results were osteoporosis - treatment: calcium supplement (vit D over the counter)    Colonoscopy Status: she had a colonoscopy in .   The results were normal.    Past Medical History:   Diagnosis Date    Acid reflux     Anemia     Anxiety     Arthritis     Hands, Back And Ankles    Arthritis     Bleeding ulcer     \"I Had Ulcers In My Stomach And Colon\"/ per pt on 2019\"they said recently having some blood in my stomach- in July ( )could not find where coming from \"    Bronchitis Last Episode     Chronic back pain     Chronic pain     [facility]    COPD (chronic obstructive

## 2023-05-23 DIAGNOSIS — D49.59 NEOPLASM OF UNSPECIFIED BEHAVIOR OF OTHER GENITOURINARY ORGAN: ICD-10-CM

## 2023-05-23 DIAGNOSIS — E34.9 ELEVATED SERUM HCG: Primary | ICD-10-CM

## 2023-05-23 LAB — PROLACTIN SERPL IA-MCNC: 13.2 NG/ML

## 2023-05-24 ENCOUNTER — OFFICE VISIT (OUTPATIENT)
Dept: INTERNAL MEDICINE CLINIC | Age: 53
End: 2023-05-24
Payer: COMMERCIAL

## 2023-05-24 VITALS
OXYGEN SATURATION: 95 % | SYSTOLIC BLOOD PRESSURE: 130 MMHG | HEART RATE: 72 BPM | DIASTOLIC BLOOD PRESSURE: 82 MMHG | BODY MASS INDEX: 41.37 KG/M2 | WEIGHT: 241 LBS

## 2023-05-24 DIAGNOSIS — F41.9 ANXIETY: ICD-10-CM

## 2023-05-24 DIAGNOSIS — E03.4 HYPOTHYROIDISM DUE TO ACQUIRED ATROPHY OF THYROID: ICD-10-CM

## 2023-05-24 DIAGNOSIS — N95.1 MENOPAUSAL VASOMOTOR SYNDROME: ICD-10-CM

## 2023-05-24 DIAGNOSIS — R11.2 NAUSEA AND VOMITING, UNSPECIFIED VOMITING TYPE: ICD-10-CM

## 2023-05-24 DIAGNOSIS — R11.0 NAUSEA: ICD-10-CM

## 2023-05-24 DIAGNOSIS — K21.9 GASTROESOPHAGEAL REFLUX DISEASE WITHOUT ESOPHAGITIS: ICD-10-CM

## 2023-05-24 DIAGNOSIS — G40.909 SEIZURE DISORDER (HCC): ICD-10-CM

## 2023-05-24 DIAGNOSIS — F32.A CHRONIC DEPRESSION: ICD-10-CM

## 2023-05-24 PROCEDURE — 1036F TOBACCO NON-USER: CPT

## 2023-05-24 PROCEDURE — G8417 CALC BMI ABV UP PARAM F/U: HCPCS

## 2023-05-24 PROCEDURE — 3017F COLORECTAL CA SCREEN DOC REV: CPT

## 2023-05-24 PROCEDURE — 3079F DIAST BP 80-89 MM HG: CPT

## 2023-05-24 PROCEDURE — G8427 DOCREV CUR MEDS BY ELIG CLIN: HCPCS

## 2023-05-24 PROCEDURE — 99214 OFFICE O/P EST MOD 30 MIN: CPT

## 2023-05-24 PROCEDURE — 3075F SYST BP GE 130 - 139MM HG: CPT

## 2023-05-24 RX ORDER — LEVOTHYROXINE SODIUM 137 UG/1
137 TABLET ORAL DAILY
Qty: 30 TABLET | Refills: 1 | Status: SHIPPED | OUTPATIENT
Start: 2023-05-24

## 2023-05-24 RX ORDER — LEVETIRACETAM 1000 MG/1
TABLET ORAL
Qty: 60 TABLET | Refills: 3 | Status: SHIPPED | OUTPATIENT
Start: 2023-05-24

## 2023-05-24 RX ORDER — PANTOPRAZOLE SODIUM 40 MG/1
40 TABLET, DELAYED RELEASE ORAL 2 TIMES DAILY
Qty: 180 TABLET | Refills: 0 | Status: SHIPPED | OUTPATIENT
Start: 2023-05-24

## 2023-05-24 RX ORDER — GABAPENTIN 800 MG/1
800 TABLET ORAL 3 TIMES DAILY
Qty: 12 TABLET | Refills: 0 | Status: CANCELLED | OUTPATIENT
Start: 2023-05-24 | End: 2023-05-28

## 2023-05-24 RX ORDER — DULOXETIN HYDROCHLORIDE 30 MG/1
90 CAPSULE, DELAYED RELEASE ORAL DAILY
Qty: 90 CAPSULE | Refills: 3 | Status: SHIPPED | OUTPATIENT
Start: 2023-05-24 | End: 2023-06-23

## 2023-05-24 RX ORDER — ONDANSETRON 4 MG/1
4 TABLET, ORALLY DISINTEGRATING ORAL 3 TIMES DAILY PRN
Qty: 30 TABLET | Refills: 1 | Status: SHIPPED | OUTPATIENT
Start: 2023-05-24

## 2023-05-24 RX ORDER — ESTRADIOL 0.5 MG/1
0.5 TABLET ORAL DAILY
Qty: 30 TABLET | Refills: 1 | Status: SHIPPED | OUTPATIENT
Start: 2023-05-24

## 2023-05-24 RX ORDER — HYDROXYZINE 50 MG/1
50 TABLET, FILM COATED ORAL 2 TIMES DAILY PRN
Qty: 30 TABLET | Refills: 0 | Status: SHIPPED | OUTPATIENT
Start: 2023-05-24

## 2023-05-24 RX ORDER — DULOXETIN HYDROCHLORIDE 60 MG/1
60 CAPSULE, DELAYED RELEASE ORAL DAILY
Qty: 30 CAPSULE | Refills: 0 | Status: CANCELLED | OUTPATIENT
Start: 2023-05-24

## 2023-05-24 RX ORDER — LEVOTHYROXINE SODIUM 0.12 MG/1
125 TABLET ORAL DAILY
Qty: 30 TABLET | Refills: 2 | Status: CANCELLED | OUTPATIENT
Start: 2023-05-24

## 2023-05-24 ASSESSMENT — ENCOUNTER SYMPTOMS
COLOR CHANGE: 0
CHOKING: 0
VOMITING: 0
NAUSEA: 0
WHEEZING: 0
RHINORRHEA: 0
STRIDOR: 0
EYE DISCHARGE: 0
COUGH: 0
EYE PAIN: 0
SORE THROAT: 0
SHORTNESS OF BREATH: 0
DIARRHEA: 0
FACIAL SWELLING: 0
ABDOMINAL PAIN: 0
CONSTIPATION: 0
TROUBLE SWALLOWING: 0
CHEST TIGHTNESS: 0
EYE REDNESS: 0

## 2023-05-24 ASSESSMENT — VISUAL ACUITY: OU: 1

## 2023-05-24 NOTE — PROGRESS NOTES
Subjective:      No chief complaint on file. HPI:  Odalis Tellez is a 45 y.o. female who presents today for f/u of elevated TSH from OBGYN office. Past Medical History:   Diagnosis Date    Acid reflux     Anemia     Anxiety     Arthritis     Hands, Back And Ankles    Arthritis     Bleeding ulcer 2014    \"I Had Ulcers In My Stomach And Colon\"/ per pt on 8/12/2019\"they said recently having some blood in my stomach- in July ( 2019)could not find where coming from \"    Bronchitis Last Episode 2014    Chronic back pain     Chronic pain     [facility]    COPD (chronic obstructive pulmonary disease) ([facility] [address]     Sees Dr. Adryan Ibarra    Depression     Disease of blood and blood forming organ     Fibromyalgia Dx 2013    GERD (gastroesophageal reflux disease)     H/O echocardiogram 08/11/2015    EF >55%. LA to be at the upper limit of normal in size. LV hypertrophy with normal LV systolic, but abnormal diastolic function. Normal valvular structures and function. H/O echocardiogram 08/30/2018    EF 55-60%    Hiatal hernia     History of blood transfusion 09/2015 And 2018    No Reaction To Blood Transfusions Received    History of sleeve gastrectomy     [place] (hard of hearing)     Right Ear    Hx of cardiac catheterization 10/24/2010    Angiographically normal coronary arteries w/ normal LV function and wall motion. Hx of transesophageal echocardiography (PILO) for monitoring 12/02/2010    EF 55-60%. WNL.     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 name    Hypertension     Immune deficiency disorder [facility])     Kidney cysts     \"they found that I have a kidney cyst but not sure which side\" per pt on 7/15/2020

## 2023-05-30 DIAGNOSIS — M05.79 RHEUMATOID ARTHRITIS INVOLVING MULTIPLE SITES WITH POSITIVE RHEUMATOID FACTOR (HCC): ICD-10-CM

## 2023-05-30 RX ORDER — SULFASALAZINE 500 MG/1
1000 TABLET ORAL 2 TIMES DAILY
Qty: 180 TABLET | Refills: 0 | Status: ON HOLD | OUTPATIENT
Start: 2023-05-30

## 2023-05-31 ENCOUNTER — TELEPHONE (OUTPATIENT)
Dept: INTERNAL MEDICINE CLINIC | Age: 53
End: 2023-05-31

## 2023-05-31 DIAGNOSIS — F41.9 ANXIETY: Primary | ICD-10-CM

## 2023-05-31 DIAGNOSIS — R11.2 NAUSEA AND VOMITING, UNSPECIFIED VOMITING TYPE: ICD-10-CM

## 2023-05-31 DIAGNOSIS — F32.A CHRONIC DEPRESSION: ICD-10-CM

## 2023-05-31 RX ORDER — PROMETHAZINE HYDROCHLORIDE 25 MG/1
25 TABLET ORAL EVERY 6 HOURS PRN
Qty: 20 TABLET | Refills: 0 | Status: SHIPPED | OUTPATIENT
Start: 2023-05-31 | End: 2023-06-07

## 2023-05-31 RX ORDER — DULOXETIN HYDROCHLORIDE 60 MG/1
60 CAPSULE, DELAYED RELEASE ORAL 2 TIMES DAILY
Qty: 60 CAPSULE | Refills: 1 | Status: SHIPPED | OUTPATIENT
Start: 2023-05-31 | End: 2023-06-30

## 2023-05-31 NOTE — PROGRESS NOTES
Patient unable to fill Cymbalta due to an insurance issue with the way it was written at previous appointment. Discussed with patient that our increase in dose is to the max daily dose and may come with side effects and if so to reduce down to the previous dose she was taking and contact our office. Discussed red flag s/s to look for with adverse reactions. Patient still would prefer to increase dose instead of adding different medication at this time as Cymbalta has been working well for her depression and anxiety. Questions answered at time of call and patient agrees with plan of care.

## 2023-05-31 NOTE — TELEPHONE ENCOUNTER
Lillian Adam spoke with pt. Increased Cymbalta to 60mg cap BID. Short run Rx for Phenergan. If pt wants to continue, she needs to see PCP, to discuss. PA closed for Cymbalta 30mg TID. No further action is needed, at this time.

## 2023-05-31 NOTE — TELEPHONE ENCOUNTER
Patient called and states insurance will not cover her Duloxetine 30 mg TID. They will only cover it BID. Patient would like to know what you like her to do? Patient also would like Phenergan 25 mg started as well. From what I see it was discontinued. Patient is leaving to go out of town around 3 pm today. Would like this done before then if possible.

## 2023-06-10 ENCOUNTER — APPOINTMENT (OUTPATIENT)
Dept: CT IMAGING | Age: 53
End: 2023-06-10
Payer: COMMERCIAL

## 2023-06-10 ENCOUNTER — HOSPITAL ENCOUNTER (EMERGENCY)
Age: 53
Discharge: HOME | End: 2023-06-10
Payer: COMMERCIAL

## 2023-06-10 VITALS
DIASTOLIC BLOOD PRESSURE: 72 MMHG | BODY MASS INDEX: 39.95 KG/M2 | HEIGHT: 64 IN | TEMPERATURE: 97.8 F | HEART RATE: 63 BPM | SYSTOLIC BLOOD PRESSURE: 142 MMHG | RESPIRATION RATE: 18 BRPM | OXYGEN SATURATION: 96 % | WEIGHT: 234 LBS

## 2023-06-10 DIAGNOSIS — R10.84 GENERALIZED ABDOMINAL PAIN: Primary | ICD-10-CM

## 2023-06-10 DIAGNOSIS — R11.2 NAUSEA AND VOMITING, UNSPECIFIED VOMITING TYPE: ICD-10-CM

## 2023-06-10 LAB
ALBUMIN SERPL-MCNC: 3.9 GM/DL (ref 3.4–5)
ALP BLD-CCNC: 106 IU/L (ref 40–129)
ALT SERPL-CCNC: 10 U/L (ref 10–40)
ANION GAP SERPL CALCULATED.3IONS-SCNC: 9 MMOL/L (ref 4–16)
AST SERPL-CCNC: 14 IU/L (ref 15–37)
BACTERIA: NEGATIVE /HPF
BASOPHILS ABSOLUTE: 0 K/CU MM
BASOPHILS RELATIVE PERCENT: 0.5 % (ref 0–1)
BILIRUB SERPL-MCNC: 0.2 MG/DL (ref 0–1)
BILIRUBIN URINE: NEGATIVE MG/DL
BLOOD, URINE: ABNORMAL
BUN SERPL-MCNC: 9 MG/DL (ref 6–23)
CALCIUM SERPL-MCNC: 9.2 MG/DL (ref 8.3–10.6)
CHLORIDE BLD-SCNC: 108 MMOL/L (ref 99–110)
CLARITY: CLEAR
CO2: 23 MMOL/L (ref 21–32)
COLOR: YELLOW
CREAT SERPL-MCNC: 0.6 MG/DL (ref 0.6–1.1)
DIFFERENTIAL TYPE: ABNORMAL
EOSINOPHILS ABSOLUTE: 0.2 K/CU MM
EOSINOPHILS RELATIVE PERCENT: 5 % (ref 0–3)
GFR SERPL CREATININE-BSD FRML MDRD: >60 ML/MIN/1.73M2
GLUCOSE SERPL-MCNC: 95 MG/DL (ref 70–99)
GLUCOSE, URINE: NEGATIVE MG/DL
HCT VFR BLD CALC: 31.3 % (ref 37–47)
HEMOGLOBIN: 9.6 GM/DL (ref 12.5–16)
IMMATURE NEUTROPHIL %: 0.3 % (ref 0–0.43)
KETONES, URINE: NEGATIVE MG/DL
LEUKOCYTE ESTERASE, URINE: ABNORMAL
LIPASE: 30 IU/L (ref 13–60)
LYMPHOCYTES ABSOLUTE: 1.4 K/CU MM
LYMPHOCYTES RELATIVE PERCENT: 36.1 % (ref 24–44)
MCH RBC QN AUTO: 26.1 PG (ref 27–31)
MCHC RBC AUTO-ENTMCNC: 30.7 % (ref 32–36)
MCV RBC AUTO: 85.1 FL (ref 78–100)
MONOCYTES ABSOLUTE: 0.3 K/CU MM
MONOCYTES RELATIVE PERCENT: 7.2 % (ref 0–4)
MUCUS: ABNORMAL HPF
NITRITE URINE, QUANTITATIVE: NEGATIVE
NUCLEATED RBC %: 0 %
PDW BLD-RTO: 14.1 % (ref 11.7–14.9)
PH, URINE: 6.5 (ref 5–8)
PLATELET # BLD: 200 K/CU MM (ref 140–440)
PMV BLD AUTO: 9.8 FL (ref 7.5–11.1)
POTASSIUM SERPL-SCNC: 3.8 MMOL/L (ref 3.5–5.1)
PROTEIN UA: NEGATIVE MG/DL
RBC # BLD: 3.68 M/CU MM (ref 4.2–5.4)
RBC URINE: 20 /HPF (ref 0–6)
SEGMENTED NEUTROPHILS ABSOLUTE COUNT: 1.9 K/CU MM
SEGMENTED NEUTROPHILS RELATIVE PERCENT: 50.9 % (ref 36–66)
SODIUM BLD-SCNC: 140 MMOL/L (ref 135–145)
SPECIFIC GRAVITY UA: <1.005 (ref 1–1.03)
SQUAMOUS EPITHELIAL: 6 /HPF
TOTAL IMMATURE NEUTOROPHIL: 0.01 K/CU MM
TOTAL NUCLEATED RBC: 0 K/CU MM
TOTAL PROTEIN: 6 GM/DL (ref 6.4–8.2)
TRICHOMONAS: ABNORMAL /HPF
UROBILINOGEN, URINE: 0.2 MG/DL (ref 0.2–1)
WBC # BLD: 3.8 K/CU MM (ref 4–10.5)
WBC UA: 1 /HPF (ref 0–5)

## 2023-06-10 PROCEDURE — 74177 CT ABD & PELVIS W/CONTRAST: CPT

## 2023-06-10 PROCEDURE — 83690 ASSAY OF LIPASE: CPT

## 2023-06-10 PROCEDURE — 81001 URINALYSIS AUTO W/SCOPE: CPT

## 2023-06-10 PROCEDURE — 2580000003 HC RX 258: Performed by: PHYSICIAN ASSISTANT

## 2023-06-10 PROCEDURE — 85025 COMPLETE CBC W/AUTO DIFF WBC: CPT

## 2023-06-10 PROCEDURE — 80053 COMPREHEN METABOLIC PANEL: CPT

## 2023-06-10 PROCEDURE — 6360000002 HC RX W HCPCS: Performed by: PHYSICIAN ASSISTANT

## 2023-06-10 PROCEDURE — 6360000004 HC RX CONTRAST MEDICATION: Performed by: PHYSICIAN ASSISTANT

## 2023-06-10 RX ORDER — PROMETHAZINE HYDROCHLORIDE 25 MG/ML
25 INJECTION, SOLUTION INTRAMUSCULAR; INTRAVENOUS ONCE
Status: COMPLETED | OUTPATIENT
Start: 2023-06-10 | End: 2023-06-10

## 2023-06-10 RX ORDER — MORPHINE SULFATE 4 MG/ML
4 INJECTION, SOLUTION INTRAMUSCULAR; INTRAVENOUS EVERY 30 MIN PRN
Status: DISCONTINUED | OUTPATIENT
Start: 2023-06-10 | End: 2023-06-10 | Stop reason: HOSPADM

## 2023-06-10 RX ORDER — HEPARIN SODIUM 100 [USP'U]/ML
100 INJECTION, SOLUTION INTRAVENOUS ONCE
Status: COMPLETED | OUTPATIENT
Start: 2023-06-10 | End: 2023-06-10

## 2023-06-10 RX ORDER — ONDANSETRON 2 MG/ML
4 INJECTION INTRAMUSCULAR; INTRAVENOUS EVERY 30 MIN PRN
Status: COMPLETED | OUTPATIENT
Start: 2023-06-10 | End: 2023-06-10

## 2023-06-10 RX ORDER — 0.9 % SODIUM CHLORIDE 0.9 %
1000 INTRAVENOUS SOLUTION INTRAVENOUS ONCE
Status: COMPLETED | OUTPATIENT
Start: 2023-06-10 | End: 2023-06-10

## 2023-06-10 RX ADMIN — IOPAMIDOL 80 ML: 755 INJECTION, SOLUTION INTRAVENOUS at 12:52

## 2023-06-10 RX ADMIN — MORPHINE SULFATE 4 MG: 4 INJECTION, SOLUTION INTRAMUSCULAR; INTRAVENOUS at 12:58

## 2023-06-10 RX ADMIN — PROMETHAZINE HYDROCHLORIDE 25 MG: 25 INJECTION INTRAMUSCULAR; INTRAVENOUS at 15:17

## 2023-06-10 RX ADMIN — ONDANSETRON 4 MG: 2 INJECTION INTRAMUSCULAR; INTRAVENOUS at 11:55

## 2023-06-10 RX ADMIN — SODIUM CHLORIDE 1000 ML: 9 INJECTION, SOLUTION INTRAVENOUS at 11:53

## 2023-06-10 RX ADMIN — MORPHINE SULFATE 4 MG: 4 INJECTION, SOLUTION INTRAMUSCULAR; INTRAVENOUS at 11:56

## 2023-06-10 RX ADMIN — HYDROMORPHONE HYDROCHLORIDE 1 MG: 1 INJECTION, SOLUTION INTRAMUSCULAR; INTRAVENOUS; SUBCUTANEOUS at 15:18

## 2023-06-10 RX ADMIN — HEPARIN SODIUM 100 UNITS: 100 INJECTION, SOLUTION INTRAVENOUS at 16:28

## 2023-06-10 RX ADMIN — ONDANSETRON 4 MG: 2 INJECTION INTRAMUSCULAR; INTRAVENOUS at 12:57

## 2023-06-10 ASSESSMENT — PAIN SCALES - GENERAL
PAINLEVEL_OUTOF10: 7
PAINLEVEL_OUTOF10: 6
PAINLEVEL_OUTOF10: 6

## 2023-06-10 ASSESSMENT — PAIN - FUNCTIONAL ASSESSMENT: PAIN_FUNCTIONAL_ASSESSMENT: PREVENTS OR INTERFERES SOME ACTIVE ACTIVITIES AND ADLS

## 2023-06-10 ASSESSMENT — PAIN DESCRIPTION - LOCATION
LOCATION: ABDOMEN
LOCATION: ABDOMEN

## 2023-06-10 ASSESSMENT — PAIN DESCRIPTION - DESCRIPTORS: DESCRIPTORS: ACHING;CRAMPING

## 2023-06-10 ASSESSMENT — PAIN DESCRIPTION - ORIENTATION: ORIENTATION: RIGHT;MID

## 2023-06-19 ENCOUNTER — OFFICE VISIT (OUTPATIENT)
Dept: ORTHOPEDIC SURGERY | Age: 53
End: 2023-06-19
Payer: COMMERCIAL

## 2023-06-19 VITALS — HEIGHT: 64 IN | BODY MASS INDEX: 40.29 KG/M2 | RESPIRATION RATE: 16 BRPM | WEIGHT: 236 LBS

## 2023-06-19 DIAGNOSIS — M54.16 LUMBAR RADICULOPATHY: Primary | ICD-10-CM

## 2023-06-19 DIAGNOSIS — M54.10 RADICULOPATHY OF LEG: ICD-10-CM

## 2023-06-19 PROCEDURE — G8427 DOCREV CUR MEDS BY ELIG CLIN: HCPCS | Performed by: STUDENT IN AN ORGANIZED HEALTH CARE EDUCATION/TRAINING PROGRAM

## 2023-06-19 PROCEDURE — 1036F TOBACCO NON-USER: CPT | Performed by: STUDENT IN AN ORGANIZED HEALTH CARE EDUCATION/TRAINING PROGRAM

## 2023-06-19 PROCEDURE — 99213 OFFICE O/P EST LOW 20 MIN: CPT | Performed by: STUDENT IN AN ORGANIZED HEALTH CARE EDUCATION/TRAINING PROGRAM

## 2023-06-19 PROCEDURE — G8417 CALC BMI ABV UP PARAM F/U: HCPCS | Performed by: STUDENT IN AN ORGANIZED HEALTH CARE EDUCATION/TRAINING PROGRAM

## 2023-06-19 PROCEDURE — 3017F COLORECTAL CA SCREEN DOC REV: CPT | Performed by: STUDENT IN AN ORGANIZED HEALTH CARE EDUCATION/TRAINING PROGRAM

## 2023-06-19 ASSESSMENT — ENCOUNTER SYMPTOMS
FACIAL SWELLING: 0
STRIDOR: 0
COLOR CHANGE: 0
SHORTNESS OF BREATH: 0
BACK PAIN: 0
EYE PAIN: 0
PHOTOPHOBIA: 0
VOMITING: 0
WHEEZING: 0
EYE REDNESS: 0
COUGH: 0
NAUSEA: 0
ABDOMINAL PAIN: 0

## 2023-06-19 NOTE — PROGRESS NOTES
Patient is in the office to discuss EMG results of the lower extremity. Patient states that she has been still having increased pain. Still going to pain management with slight relief. Patient is still unable to completely bend the knee. Patient struggles to bend the knee.  (Flexion and Extension)

## 2023-06-19 NOTE — PATIENT INSTRUCTIONS
Central scheduling [phone] will be calling you to schedule your MRI. If you do not hear from them with in a week give them a call. Follow up with Dr. Varghese Rosales with MRI results. Follow up with Dr. Venora Duverney as needed. If Dr. Varghese Rosales does not have a surgical solution, give us a call and we may be able to try Iovera injections.

## 2023-06-19 NOTE — PROGRESS NOTES
6/19/2023   Chief Complaint   Patient presents with    Results     Lower extremity EMG         History of Present Illness:                             Verito Grant is a 45 y.o. female     Date of surgery: 9-     Procedure:  1. Diagnostic and operative arthroscopy of Left knee with irrigation and debridement  2. Placement of Left knee drain      History:  Luz Colon is here in follow up regarding their left knee as well as to discuss EMG findings and discuss treatment options. No new issues regarding the knee. Continues with pain in the S1 nerve distribution. She also has tingling and altered sensation in this distribution, and she feels that is unchanged from her last visit. No effusion of the knee and no concerning findings or any type of infection. They deny chest pain, SOB, calf pain,fever,wound drainage. No other issues. Patient denies any constitutional symptoms. Patient has been ambulating without assistive device    Patient continues to get mild relief from ice and pain medication. She is been inconsistent with utilizing compression. Patient has been working on maintaining knee range of motion    Patient continues seeing rheumatology and they have been changing her medication to control her rheumatologic disease. Medical History  Patient's medications, allergies, past medical, surgical, social and family histories were reviewed and updated as appropriate.     Past Medical History:   Diagnosis Date    Acid reflux     Anemia     Anxiety     Arthritis     Hands, Back And Ankles    Arthritis     Bleeding ulcer 2014    \"I Had Ulcers In My Stomach And Colon\"/ per pt on 8/12/2019\"they said recently having some blood in my stomach- in July ( 2019)could not find where coming from \"    Bronchitis Last Episode 2014    Chronic back pain     Chronic pain     [facility]    COPD (chronic obstructive pulmonary disease) ([facility])     Sees Dr. Nahomi Arevalo    Depression

## 2023-06-29 DIAGNOSIS — M25.562 ACUTE PAIN OF LEFT KNEE: Primary | ICD-10-CM

## 2023-07-06 NOTE — PROGRESS NOTES
Patient Name: Silva Cazares  Patient : 1970  Patient MRN: [id number]     Primary Oncologist: Key Peterson MD  Referring Provider: Diana Mcgovern MD    Date of Service: 2023      Chief Complaint:   Chief Complaint   Patient presents with    Follow-Up from Hospital     FU visit. Problem List:      Fibromyalgia     Lupus (systemic lupus erythematosus) (     Chronic pancreatitis      HTN (hypertension)     Gastroesophageal reflux disease without esophagitis     Depression     Fatty liver disease, nonalcoholic     Arthritis     Bilateral low back pain with left-sided sciatica     Hiatal hernia     Status post laparoscopic sleeve gastrectomy     Pseudoseizure     Chronic pain syndrome     Drug-seeking/Aberrant behavior     Lymph node disorder     Morbid obesity with BMI of 40.0-44.9, adult (HCC)     Iron deficiency anemia secondary to blood loss (chronic)     Encounter for weight management     Anxiety     Status post bariatric surgery     Morbid obesity with BMI of 45.0-49.9, adult (HCC)     Discoloration of skin of flank resembling ecchymosis     Morbid obesity with BMI of 50.0-59.9, adult (HCC)    HPI:   Louise Taylor is a pleasant 45 y.o.  female patient with significant past medical history of gastric ulcer/AVM who presented to ER in 2018  with abdominal pain. On her admission she had normal CBC and Hg dropped during her stay. She had multiple EGD's in the past. Colonoscopy was several years ago. EGD X2 on this admission showed erosive gastritis with bright blood in the lumen. She received 4 units PRBC. She has underlying SLE/connective tissue disorder. She was followed by surgeon for potential bariatric surgery. She had h/o of MRSA 2 years ago. CT abdomen in 2018:  1. No acute intra-abdominal abnormality to account for the patient's symptoms. 2. Status post cholecystectomy with pneumobilia, likely related to prior sphincterotomy.  3. Status post hysterectomy and

## 2023-07-07 ENCOUNTER — TELEPHONE (OUTPATIENT)
Dept: INTERNAL MEDICINE CLINIC | Age: 53
End: 2023-07-07

## 2023-07-07 NOTE — TELEPHONE ENCOUNTER
Pt called in office due to stomach pain. PCP and NP had no openings on this date.     Informed pt to go to walk in clinic in town-pt voiced understanding

## 2023-07-09 ENCOUNTER — HOSPITAL ENCOUNTER (INPATIENT)
Age: 53
LOS: 2 days | Discharge: LEFT BEFORE DISCHARGE | DRG: 392 | End: 2023-07-12
Attending: EMERGENCY MEDICINE | Admitting: STUDENT IN AN ORGANIZED HEALTH CARE EDUCATION/TRAINING PROGRAM
Payer: COMMERCIAL

## 2023-07-09 ENCOUNTER — APPOINTMENT (OUTPATIENT)
Dept: CT IMAGING | Age: 53
DRG: 392 | End: 2023-07-09
Attending: EMERGENCY MEDICINE
Payer: COMMERCIAL

## 2023-07-09 DIAGNOSIS — R11.2 INTRACTABLE NAUSEA AND VOMITING: Primary | ICD-10-CM

## 2023-07-09 DIAGNOSIS — R10.13 ABDOMINAL PAIN, EPIGASTRIC: ICD-10-CM

## 2023-07-09 LAB
ALBUMIN SERPL-MCNC: 4.1 GM/DL (ref 3.4–5)
ALP BLD-CCNC: 104 IU/L (ref 40–129)
ALT SERPL-CCNC: 13 U/L (ref 10–40)
ANION GAP SERPL CALCULATED.3IONS-SCNC: 8 MMOL/L (ref 4–16)
AST SERPL-CCNC: 16 IU/L (ref 15–37)
BACTERIA: NEGATIVE /HPF
BASOPHILS ABSOLUTE: 0.1 K/CU MM
BASOPHILS RELATIVE PERCENT: 1.3 % (ref 0–1)
BILIRUB SERPL-MCNC: 0.3 MG/DL (ref 0–1)
BILIRUBIN URINE: NEGATIVE MG/DL
BLOOD, URINE: NEGATIVE
BUN SERPL-MCNC: 14 MG/DL (ref 6–23)
CALCIUM SERPL-MCNC: 9.8 MG/DL (ref 8.3–10.6)
CHLORIDE BLD-SCNC: 107 MMOL/L (ref 99–110)
CLARITY: CLEAR
CO2: 25 MMOL/L (ref 21–32)
COLOR: YELLOW
CREAT SERPL-MCNC: 0.6 MG/DL (ref 0.6–1.1)
DIFFERENTIAL TYPE: ABNORMAL
EKG ATRIAL RATE: 68 BPM
EKG DIAGNOSIS: NORMAL
EKG P AXIS: 43 DEGREES
EKG P-R INTERVAL: 184 MS
EKG Q-T INTERVAL: 388 MS
EKG QRS DURATION: 84 MS
EKG QTC CALCULATION (BAZETT): 412 MS
EKG R AXIS: -19 DEGREES
EKG T AXIS: 18 DEGREES
EKG VENTRICULAR RATE: 68 BPM
EOSINOPHILS ABSOLUTE: 0.2 K/CU MM
EOSINOPHILS RELATIVE PERCENT: 5.3 % (ref 0–3)
GFR SERPL CREATININE-BSD FRML MDRD: >60 ML/MIN/1.73M2
GLUCOSE SERPL-MCNC: 90 MG/DL (ref 70–99)
GLUCOSE, URINE: NEGATIVE MG/DL
HCT VFR BLD CALC: 32.1 % (ref 37–47)
HEMOGLOBIN: 9.9 GM/DL (ref 12.5–16)
IMMATURE NEUTROPHIL %: 0.3 % (ref 0–0.43)
KETONES, URINE: NEGATIVE MG/DL
LACTIC ACID, SEPSIS: 0.8 MMOL/L (ref 0.5–1.9)
LEUKOCYTE ESTERASE, URINE: ABNORMAL
LIPASE: 22 IU/L (ref 13–60)
LYMPHOCYTES ABSOLUTE: 1.6 K/CU MM
LYMPHOCYTES RELATIVE PERCENT: 39.7 % (ref 24–44)
MAGNESIUM: 2.1 MG/DL (ref 1.8–2.4)
MCH RBC QN AUTO: 26.8 PG (ref 27–31)
MCHC RBC AUTO-ENTMCNC: 30.8 % (ref 32–36)
MCV RBC AUTO: 87 FL (ref 78–100)
MONOCYTES ABSOLUTE: 0.3 K/CU MM
MONOCYTES RELATIVE PERCENT: 7.8 % (ref 0–4)
NITRITE URINE, QUANTITATIVE: NEGATIVE
NUCLEATED RBC %: 0 %
PDW BLD-RTO: 13.7 % (ref 11.7–14.9)
PH, URINE: 6 (ref 5–8)
PLATELET # BLD: 174 K/CU MM (ref 140–440)
PMV BLD AUTO: 10.2 FL (ref 7.5–11.1)
POTASSIUM SERPL-SCNC: 4 MMOL/L (ref 3.5–5.1)
PROTEIN UA: NEGATIVE MG/DL
RBC # BLD: 3.69 M/CU MM (ref 4.2–5.4)
RBC URINE: 1 /HPF (ref 0–6)
SEGMENTED NEUTROPHILS ABSOLUTE COUNT: 1.8 K/CU MM
SEGMENTED NEUTROPHILS RELATIVE PERCENT: 45.6 % (ref 36–66)
SODIUM BLD-SCNC: 140 MMOL/L (ref 135–145)
SPECIFIC GRAVITY UA: <1.005 (ref 1–1.03)
SQUAMOUS EPITHELIAL: 4 /HPF
TOTAL IMMATURE NEUTOROPHIL: 0.01 K/CU MM
TOTAL NUCLEATED RBC: 0 K/CU MM
TOTAL PROTEIN: 5.9 GM/DL (ref 6.4–8.2)
TRICHOMONAS: NORMAL /HPF
TROPONIN T: <0.01 NG/ML
UROBILINOGEN, URINE: 0.2 MG/DL (ref 0.2–1)
WBC # BLD: 4 K/CU MM (ref 4–10.5)
WBC UA: 3 /HPF (ref 0–5)

## 2023-07-09 PROCEDURE — 83605 ASSAY OF LACTIC ACID: CPT

## 2023-07-09 PROCEDURE — 96376 TX/PRO/DX INJ SAME DRUG ADON: CPT

## 2023-07-09 PROCEDURE — 84484 ASSAY OF TROPONIN QUANT: CPT

## 2023-07-09 PROCEDURE — G0378 HOSPITAL OBSERVATION PER HR: HCPCS

## 2023-07-09 PROCEDURE — 83735 ASSAY OF MAGNESIUM: CPT

## 2023-07-09 PROCEDURE — 93005 ELECTROCARDIOGRAM TRACING: CPT | Performed by: EMERGENCY MEDICINE

## 2023-07-09 PROCEDURE — 81001 URINALYSIS AUTO W/SCOPE: CPT

## 2023-07-09 PROCEDURE — 6360000002 HC RX W HCPCS: Performed by: EMERGENCY MEDICINE

## 2023-07-09 PROCEDURE — C9113 INJ PANTOPRAZOLE SODIUM, VIA: HCPCS | Performed by: NURSE PRACTITIONER

## 2023-07-09 PROCEDURE — 96361 HYDRATE IV INFUSION ADD-ON: CPT

## 2023-07-09 PROCEDURE — 6360000002 HC RX W HCPCS: Performed by: NURSE PRACTITIONER

## 2023-07-09 PROCEDURE — 83690 ASSAY OF LIPASE: CPT

## 2023-07-09 PROCEDURE — 99285 EMERGENCY DEPT VISIT HI MDM: CPT

## 2023-07-09 PROCEDURE — 87086 URINE CULTURE/COLONY COUNT: CPT

## 2023-07-09 PROCEDURE — 80053 COMPREHEN METABOLIC PANEL: CPT

## 2023-07-09 PROCEDURE — 2580000003 HC RX 258: Performed by: EMERGENCY MEDICINE

## 2023-07-09 PROCEDURE — 93010 ELECTROCARDIOGRAM REPORT: CPT | Performed by: INTERNAL MEDICINE

## 2023-07-09 PROCEDURE — 96372 THER/PROPH/DIAG INJ SC/IM: CPT

## 2023-07-09 PROCEDURE — 85025 COMPLETE CBC W/AUTO DIFF WBC: CPT

## 2023-07-09 PROCEDURE — 2580000003 HC RX 258: Performed by: NURSE PRACTITIONER

## 2023-07-09 PROCEDURE — 96365 THER/PROPH/DIAG IV INF INIT: CPT

## 2023-07-09 PROCEDURE — 96375 TX/PRO/DX INJ NEW DRUG ADDON: CPT

## 2023-07-09 PROCEDURE — 74176 CT ABD & PELVIS W/O CONTRAST: CPT

## 2023-07-09 RX ORDER — PROMETHAZINE HYDROCHLORIDE 25 MG/ML
25 INJECTION, SOLUTION INTRAMUSCULAR; INTRAVENOUS ONCE
Status: COMPLETED | OUTPATIENT
Start: 2023-07-09 | End: 2023-07-09

## 2023-07-09 RX ORDER — GABAPENTIN 400 MG/1
800 CAPSULE ORAL 3 TIMES DAILY
Status: DISCONTINUED | OUTPATIENT
Start: 2023-07-09 | End: 2023-07-12 | Stop reason: HOSPADM

## 2023-07-09 RX ORDER — MORPHINE SULFATE 4 MG/ML
4 INJECTION, SOLUTION INTRAMUSCULAR; INTRAVENOUS
Status: DISCONTINUED | OUTPATIENT
Start: 2023-07-09 | End: 2023-07-10

## 2023-07-09 RX ORDER — ONDANSETRON 2 MG/ML
4 INJECTION INTRAMUSCULAR; INTRAVENOUS EVERY 30 MIN PRN
Status: DISCONTINUED | OUTPATIENT
Start: 2023-07-09 | End: 2023-07-11

## 2023-07-09 RX ORDER — ONDANSETRON 4 MG/1
4 TABLET, ORALLY DISINTEGRATING ORAL EVERY 6 HOURS
Status: COMPLETED | OUTPATIENT
Start: 2023-07-09 | End: 2023-07-10

## 2023-07-09 RX ORDER — SODIUM CHLORIDE 0.9 % (FLUSH) 0.9 %
5-40 SYRINGE (ML) INJECTION EVERY 12 HOURS SCHEDULED
Status: DISCONTINUED | OUTPATIENT
Start: 2023-07-09 | End: 2023-07-12 | Stop reason: HOSPADM

## 2023-07-09 RX ORDER — SODIUM CHLORIDE 9 MG/ML
INJECTION, SOLUTION INTRAVENOUS CONTINUOUS
Status: DISCONTINUED | OUTPATIENT
Start: 2023-07-09 | End: 2023-07-12 | Stop reason: HOSPADM

## 2023-07-09 RX ORDER — PROCHLORPERAZINE EDISYLATE 5 MG/ML
10 INJECTION INTRAMUSCULAR; INTRAVENOUS EVERY 6 HOURS PRN
Status: DISCONTINUED | OUTPATIENT
Start: 2023-07-09 | End: 2023-07-12 | Stop reason: HOSPADM

## 2023-07-09 RX ORDER — ENOXAPARIN SODIUM 100 MG/ML
30 INJECTION SUBCUTANEOUS 2 TIMES DAILY
Status: DISCONTINUED | OUTPATIENT
Start: 2023-07-09 | End: 2023-07-09

## 2023-07-09 RX ORDER — ONDANSETRON 2 MG/ML
4 INJECTION INTRAMUSCULAR; INTRAVENOUS EVERY 6 HOURS
Status: COMPLETED | OUTPATIENT
Start: 2023-07-09 | End: 2023-07-10

## 2023-07-09 RX ORDER — DULOXETIN HYDROCHLORIDE 30 MG/1
60 CAPSULE, DELAYED RELEASE ORAL 2 TIMES DAILY
Status: DISCONTINUED | OUTPATIENT
Start: 2023-07-09 | End: 2023-07-12 | Stop reason: HOSPADM

## 2023-07-09 RX ORDER — 0.9 % SODIUM CHLORIDE 0.9 %
1000 INTRAVENOUS SOLUTION INTRAVENOUS ONCE
Status: COMPLETED | OUTPATIENT
Start: 2023-07-09 | End: 2023-07-09

## 2023-07-09 RX ORDER — POLYETHYLENE GLYCOL 3350 17 G/17G
17 POWDER, FOR SOLUTION ORAL DAILY PRN
Status: DISCONTINUED | OUTPATIENT
Start: 2023-07-09 | End: 2023-07-12 | Stop reason: HOSPADM

## 2023-07-09 RX ORDER — ENOXAPARIN SODIUM 100 MG/ML
40 INJECTION SUBCUTANEOUS DAILY
Status: DISCONTINUED | OUTPATIENT
Start: 2023-07-10 | End: 2023-07-12 | Stop reason: HOSPADM

## 2023-07-09 RX ORDER — FAMOTIDINE 10 MG/ML
20 INJECTION, SOLUTION INTRAVENOUS 2 TIMES DAILY
Status: DISCONTINUED | OUTPATIENT
Start: 2023-07-09 | End: 2023-07-09

## 2023-07-09 RX ORDER — PANTOPRAZOLE SODIUM 40 MG/10ML
40 INJECTION, POWDER, LYOPHILIZED, FOR SOLUTION INTRAVENOUS DAILY
Status: DISCONTINUED | OUTPATIENT
Start: 2023-07-09 | End: 2023-07-12 | Stop reason: HOSPADM

## 2023-07-09 RX ORDER — SODIUM CHLORIDE 0.9 % (FLUSH) 0.9 %
5-40 SYRINGE (ML) INJECTION PRN
Status: DISCONTINUED | OUTPATIENT
Start: 2023-07-09 | End: 2023-07-12 | Stop reason: HOSPADM

## 2023-07-09 RX ORDER — SODIUM CHLORIDE 9 MG/ML
INJECTION, SOLUTION INTRAVENOUS PRN
Status: DISCONTINUED | OUTPATIENT
Start: 2023-07-09 | End: 2023-07-12 | Stop reason: HOSPADM

## 2023-07-09 RX ORDER — MORPHINE SULFATE 4 MG/ML
4 INJECTION, SOLUTION INTRAMUSCULAR; INTRAVENOUS EVERY 30 MIN PRN
Status: DISCONTINUED | OUTPATIENT
Start: 2023-07-09 | End: 2023-07-10

## 2023-07-09 RX ORDER — MORPHINE SULFATE 2 MG/ML
2 INJECTION, SOLUTION INTRAMUSCULAR; INTRAVENOUS
Status: DISCONTINUED | OUTPATIENT
Start: 2023-07-09 | End: 2023-07-10

## 2023-07-09 RX ORDER — SCOLOPAMINE TRANSDERMAL SYSTEM 1 MG/1
1 PATCH, EXTENDED RELEASE TRANSDERMAL
Status: DISCONTINUED | OUTPATIENT
Start: 2023-07-09 | End: 2023-07-11 | Stop reason: SDUPTHER

## 2023-07-09 RX ADMIN — MORPHINE SULFATE 4 MG: 4 INJECTION, SOLUTION INTRAMUSCULAR; INTRAVENOUS at 11:50

## 2023-07-09 RX ADMIN — MORPHINE SULFATE 4 MG: 4 INJECTION, SOLUTION INTRAMUSCULAR; INTRAVENOUS at 13:09

## 2023-07-09 RX ADMIN — LEVETIRACETAM 1000 MG: 100 INJECTION, SOLUTION INTRAVENOUS at 12:14

## 2023-07-09 RX ADMIN — MORPHINE SULFATE 4 MG: 4 INJECTION, SOLUTION INTRAMUSCULAR; INTRAVENOUS at 18:08

## 2023-07-09 RX ADMIN — MORPHINE SULFATE 4 MG: 4 INJECTION, SOLUTION INTRAMUSCULAR; INTRAVENOUS at 21:46

## 2023-07-09 RX ADMIN — ONDANSETRON 4 MG: 2 INJECTION INTRAMUSCULAR; INTRAVENOUS at 18:11

## 2023-07-09 RX ADMIN — SODIUM CHLORIDE 1000 ML: 9 INJECTION, SOLUTION INTRAVENOUS at 11:47

## 2023-07-09 RX ADMIN — PANTOPRAZOLE SODIUM 40 MG: 40 INJECTION, POWDER, FOR SOLUTION INTRAVENOUS at 18:06

## 2023-07-09 RX ADMIN — SODIUM CHLORIDE 1000 ML: 9 INJECTION, SOLUTION INTRAVENOUS at 15:00

## 2023-07-09 RX ADMIN — MORPHINE SULFATE 4 MG: 4 INJECTION, SOLUTION INTRAMUSCULAR; INTRAVENOUS at 15:02

## 2023-07-09 RX ADMIN — ONDANSETRON 4 MG: 2 INJECTION INTRAMUSCULAR; INTRAVENOUS at 15:02

## 2023-07-09 RX ADMIN — SODIUM CHLORIDE: 9 INJECTION, SOLUTION INTRAVENOUS at 17:40

## 2023-07-09 RX ADMIN — PROMETHAZINE HYDROCHLORIDE 25 MG: 25 INJECTION INTRAMUSCULAR; INTRAVENOUS at 13:06

## 2023-07-09 ASSESSMENT — PAIN DESCRIPTION - LOCATION
LOCATION: BACK;ABDOMEN
LOCATION: ABDOMEN;BACK
LOCATION: ABDOMEN;BACK
LOCATION: ABDOMEN
LOCATION: ABDOMEN;BACK
LOCATION: ABDOMEN
LOCATION: ABDOMEN;BACK

## 2023-07-09 ASSESSMENT — PAIN SCALES - GENERAL
PAINLEVEL_OUTOF10: 8
PAINLEVEL_OUTOF10: 6
PAINLEVEL_OUTOF10: 4
PAINLEVEL_OUTOF10: 6
PAINLEVEL_OUTOF10: 8
PAINLEVEL_OUTOF10: 7
PAINLEVEL_OUTOF10: 6

## 2023-07-09 ASSESSMENT — LIFESTYLE VARIABLES
HOW MANY STANDARD DRINKS CONTAINING ALCOHOL DO YOU HAVE ON A TYPICAL DAY: PATIENT DOES NOT DRINK
HOW OFTEN DO YOU HAVE A DRINK CONTAINING ALCOHOL: NEVER

## 2023-07-09 ASSESSMENT — PAIN - FUNCTIONAL ASSESSMENT: PAIN_FUNCTIONAL_ASSESSMENT: PREVENTS OR INTERFERES SOME ACTIVE ACTIVITIES AND ADLS

## 2023-07-09 ASSESSMENT — PAIN SCALES - WONG BAKER: WONGBAKER_NUMERICALRESPONSE: 4

## 2023-07-09 ASSESSMENT — PAIN DESCRIPTION - DESCRIPTORS
DESCRIPTORS: CRAMPING
DESCRIPTORS: ACHING;GNAWING
DESCRIPTORS: CRAMPING

## 2023-07-09 ASSESSMENT — PAIN DESCRIPTION - ORIENTATION
ORIENTATION: LOWER;UPPER
ORIENTATION: UPPER;LOWER
ORIENTATION: UPPER;LOWER
ORIENTATION: RIGHT;MID

## 2023-07-10 LAB
ALBUMIN SERPL-MCNC: 3.7 GM/DL (ref 3.4–5)
ALP BLD-CCNC: 102 IU/L (ref 40–129)
ALT SERPL-CCNC: 12 U/L (ref 10–40)
ANION GAP SERPL CALCULATED.3IONS-SCNC: 6 MMOL/L (ref 4–16)
AST SERPL-CCNC: 16 IU/L (ref 15–37)
BILIRUB SERPL-MCNC: 0.2 MG/DL (ref 0–1)
BUN SERPL-MCNC: 13 MG/DL (ref 6–23)
CALCIUM SERPL-MCNC: 9.2 MG/DL (ref 8.3–10.6)
CHLORIDE BLD-SCNC: 109 MMOL/L (ref 99–110)
CO2: 25 MMOL/L (ref 21–32)
CREAT SERPL-MCNC: 0.6 MG/DL (ref 0.6–1.1)
CULTURE: NORMAL
GFR SERPL CREATININE-BSD FRML MDRD: >60 ML/MIN/1.73M2
GLUCOSE SERPL-MCNC: 133 MG/DL (ref 70–99)
Lab: NORMAL
POTASSIUM SERPL-SCNC: 4.5 MMOL/L (ref 3.5–5.1)
SODIUM BLD-SCNC: 140 MMOL/L (ref 135–145)
SPECIMEN: NORMAL
TOTAL PROTEIN: 5.2 GM/DL (ref 6.4–8.2)

## 2023-07-10 PROCEDURE — 94761 N-INVAS EAR/PLS OXIMETRY MLT: CPT

## 2023-07-10 PROCEDURE — 6370000000 HC RX 637 (ALT 250 FOR IP): Performed by: NURSE PRACTITIONER

## 2023-07-10 PROCEDURE — 96376 TX/PRO/DX INJ SAME DRUG ADON: CPT

## 2023-07-10 PROCEDURE — 2580000003 HC RX 258: Performed by: NURSE PRACTITIONER

## 2023-07-10 PROCEDURE — C9113 INJ PANTOPRAZOLE SODIUM, VIA: HCPCS | Performed by: NURSE PRACTITIONER

## 2023-07-10 PROCEDURE — 96372 THER/PROPH/DIAG INJ SC/IM: CPT

## 2023-07-10 PROCEDURE — 96366 THER/PROPH/DIAG IV INF ADDON: CPT

## 2023-07-10 PROCEDURE — 6360000002 HC RX W HCPCS: Performed by: EMERGENCY MEDICINE

## 2023-07-10 PROCEDURE — 2580000003 HC RX 258: Performed by: EMERGENCY MEDICINE

## 2023-07-10 PROCEDURE — 1200000000 HC SEMI PRIVATE

## 2023-07-10 PROCEDURE — 6360000002 HC RX W HCPCS: Performed by: NURSE PRACTITIONER

## 2023-07-10 PROCEDURE — 2500000003 HC RX 250 WO HCPCS: Performed by: NURSE PRACTITIONER

## 2023-07-10 PROCEDURE — 96367 TX/PROPH/DG ADDL SEQ IV INF: CPT

## 2023-07-10 PROCEDURE — 6370000000 HC RX 637 (ALT 250 FOR IP): Performed by: FAMILY MEDICINE

## 2023-07-10 PROCEDURE — 99223 1ST HOSP IP/OBS HIGH 75: CPT | Performed by: STUDENT IN AN ORGANIZED HEALTH CARE EDUCATION/TRAINING PROGRAM

## 2023-07-10 PROCEDURE — 80053 COMPREHEN METABOLIC PANEL: CPT

## 2023-07-10 RX ORDER — ONDANSETRON 2 MG/ML
8 INJECTION INTRAMUSCULAR; INTRAVENOUS ONCE
Status: COMPLETED | OUTPATIENT
Start: 2023-07-10 | End: 2023-07-10

## 2023-07-10 RX ORDER — MORPHINE SULFATE 2 MG/ML
2 INJECTION, SOLUTION INTRAMUSCULAR; INTRAVENOUS EVERY 6 HOURS PRN
Status: DISCONTINUED | OUTPATIENT
Start: 2023-07-10 | End: 2023-07-11

## 2023-07-10 RX ORDER — DIAPER,BRIEF,INFANT-TODD,DISP
EACH MISCELLANEOUS 2 TIMES DAILY
Status: DISCONTINUED | OUTPATIENT
Start: 2023-07-10 | End: 2023-07-12 | Stop reason: HOSPADM

## 2023-07-10 RX ORDER — OXYCODONE HYDROCHLORIDE AND ACETAMINOPHEN 5; 325 MG/1; MG/1
1 TABLET ORAL EVERY 6 HOURS PRN
Status: DISCONTINUED | OUTPATIENT
Start: 2023-07-10 | End: 2023-07-10

## 2023-07-10 RX ORDER — HYDROXYZINE HYDROCHLORIDE 25 MG/1
50 TABLET, FILM COATED ORAL 3 TIMES DAILY PRN
Status: DISCONTINUED | OUTPATIENT
Start: 2023-07-10 | End: 2023-07-12 | Stop reason: HOSPADM

## 2023-07-10 RX ORDER — ACETAMINOPHEN 500 MG
1000 TABLET ORAL EVERY 8 HOURS
Status: COMPLETED | OUTPATIENT
Start: 2023-07-10 | End: 2023-07-10

## 2023-07-10 RX ADMIN — MORPHINE SULFATE 4 MG: 4 INJECTION, SOLUTION INTRAMUSCULAR; INTRAVENOUS at 09:26

## 2023-07-10 RX ADMIN — ACETAMINOPHEN 1000 MG: 500 TABLET ORAL at 21:57

## 2023-07-10 RX ADMIN — SODIUM CHLORIDE, PRESERVATIVE FREE 10 ML: 5 INJECTION INTRAVENOUS at 09:26

## 2023-07-10 RX ADMIN — PANTOPRAZOLE SODIUM 40 MG: 40 INJECTION, POWDER, FOR SOLUTION INTRAVENOUS at 09:25

## 2023-07-10 RX ADMIN — LEVOTHYROXINE SODIUM 137 MCG: 25 TABLET ORAL at 09:31

## 2023-07-10 RX ADMIN — HYDROCORTISONE: 0.01 CREAM TOPICAL at 21:58

## 2023-07-10 RX ADMIN — MORPHINE SULFATE 2 MG: 2 INJECTION, SOLUTION INTRAMUSCULAR; INTRAVENOUS at 21:40

## 2023-07-10 RX ADMIN — ONDANSETRON 4 MG: 2 INJECTION INTRAMUSCULAR; INTRAVENOUS at 11:51

## 2023-07-10 RX ADMIN — ONDANSETRON 8 MG: 2 INJECTION INTRAMUSCULAR; INTRAVENOUS at 14:57

## 2023-07-10 RX ADMIN — DULOXETINE HYDROCHLORIDE 60 MG: 30 CAPSULE, DELAYED RELEASE ORAL at 09:25

## 2023-07-10 RX ADMIN — ONDANSETRON 4 MG: 2 INJECTION INTRAMUSCULAR; INTRAVENOUS at 04:38

## 2023-07-10 RX ADMIN — HYDROCORTISONE: 0.01 CREAM TOPICAL at 09:25

## 2023-07-10 RX ADMIN — VALPROATE SODIUM 500 MG: 100 INJECTION, SOLUTION INTRAVENOUS at 21:58

## 2023-07-10 RX ADMIN — OXYCODONE HYDROCHLORIDE AND ACETAMINOPHEN 1 TABLET: 5; 325 TABLET ORAL at 14:25

## 2023-07-10 RX ADMIN — ONDANSETRON 4 MG: 2 INJECTION INTRAMUSCULAR; INTRAVENOUS at 00:44

## 2023-07-10 RX ADMIN — LEVETIRACETAM 1000 MG: 100 INJECTION, SOLUTION INTRAVENOUS at 13:07

## 2023-07-10 RX ADMIN — VALPROATE SODIUM 500 MG: 100 INJECTION, SOLUTION INTRAVENOUS at 11:45

## 2023-07-10 RX ADMIN — MORPHINE SULFATE 2 MG: 2 INJECTION, SOLUTION INTRAMUSCULAR; INTRAVENOUS at 16:15

## 2023-07-10 RX ADMIN — HYDROXYZINE HYDROCHLORIDE 50 MG: 50 TABLET, FILM COATED ORAL at 11:42

## 2023-07-10 RX ADMIN — GABAPENTIN 800 MG: 400 CAPSULE ORAL at 09:31

## 2023-07-10 RX ADMIN — MORPHINE SULFATE 4 MG: 4 INJECTION, SOLUTION INTRAMUSCULAR; INTRAVENOUS at 11:51

## 2023-07-10 RX ADMIN — MORPHINE SULFATE 4 MG: 4 INJECTION, SOLUTION INTRAMUSCULAR; INTRAVENOUS at 04:37

## 2023-07-10 RX ADMIN — ENOXAPARIN SODIUM 40 MG: 100 INJECTION SUBCUTANEOUS at 09:25

## 2023-07-10 RX ADMIN — DULOXETINE HYDROCHLORIDE 60 MG: 30 CAPSULE, DELAYED RELEASE ORAL at 21:57

## 2023-07-10 RX ADMIN — GABAPENTIN 800 MG: 400 CAPSULE ORAL at 14:25

## 2023-07-10 RX ADMIN — GABAPENTIN 800 MG: 400 CAPSULE ORAL at 22:10

## 2023-07-10 RX ADMIN — SODIUM CHLORIDE: 9 INJECTION, SOLUTION INTRAVENOUS at 00:51

## 2023-07-10 RX ADMIN — SODIUM CHLORIDE, PRESERVATIVE FREE 10 ML: 5 INJECTION INTRAVENOUS at 21:58

## 2023-07-10 RX ADMIN — LEVETIRACETAM 1000 MG: 100 INJECTION, SOLUTION INTRAVENOUS at 00:54

## 2023-07-10 RX ADMIN — ACETAMINOPHEN 1000 MG: 500 TABLET ORAL at 11:42

## 2023-07-10 RX ADMIN — MORPHINE SULFATE 4 MG: 4 INJECTION, SOLUTION INTRAMUSCULAR; INTRAVENOUS at 07:31

## 2023-07-10 RX ADMIN — MORPHINE SULFATE 4 MG: 4 INJECTION, SOLUTION INTRAMUSCULAR; INTRAVENOUS at 00:43

## 2023-07-10 ASSESSMENT — PAIN DESCRIPTION - LOCATION
LOCATION: BACK;ABDOMEN
LOCATION: ABDOMEN
LOCATION: ABDOMEN;BACK
LOCATION: ABDOMEN

## 2023-07-10 ASSESSMENT — PAIN SCALES - GENERAL
PAINLEVEL_OUTOF10: 5
PAINLEVEL_OUTOF10: 7
PAINLEVEL_OUTOF10: 6
PAINLEVEL_OUTOF10: 7
PAINLEVEL_OUTOF10: 7
PAINLEVEL_OUTOF10: 6
PAINLEVEL_OUTOF10: 7
PAINLEVEL_OUTOF10: 5
PAINLEVEL_OUTOF10: 7
PAINLEVEL_OUTOF10: 6
PAINLEVEL_OUTOF10: 7

## 2023-07-10 ASSESSMENT — PAIN DESCRIPTION - DESCRIPTORS
DESCRIPTORS: ACHING;DISCOMFORT;DULL
DESCRIPTORS: GNAWING
DESCRIPTORS: ACHING;DISCOMFORT;DULL
DESCRIPTORS: ACHING;DISCOMFORT;DULL
DESCRIPTORS: ACHING;DISCOMFORT;DULL;CRAMPING
DESCRIPTORS: ACHING;DISCOMFORT;DULL
DESCRIPTORS: ACHING;DISCOMFORT;DULL
DESCRIPTORS: ACHING;SPASM

## 2023-07-10 ASSESSMENT — PAIN SCALES - WONG BAKER
WONGBAKER_NUMERICALRESPONSE: 4
WONGBAKER_NUMERICALRESPONSE: 4

## 2023-07-10 ASSESSMENT — PAIN DESCRIPTION - ORIENTATION
ORIENTATION: UPPER;MID;LOWER
ORIENTATION: RIGHT;LEFT;UPPER;LOWER

## 2023-07-10 ASSESSMENT — PAIN - FUNCTIONAL ASSESSMENT
PAIN_FUNCTIONAL_ASSESSMENT: ACTIVITIES ARE NOT PREVENTED

## 2023-07-10 NOTE — CONSULTS
[facility] DICTATED [id number]
or rigidity. Liver and spleen are not palpable. Bowel  sounds are present. RECTAL:  Deferred. CNS:  Exam shows the patient to be awake, alert, and oriented. There  are no focal sensorimotor signs. MUSCULOSKELETAL:  Exam shows evidence of degenerative joint disease  changes. LABORATORY DATA:  As above mentioned. IMPRESSION:  3  A 49-year-old white female with multiple comorbidities presents with  2-week history of right upper quadrant/right back pain along with  nausea, vomiting, etiology to be determined. 2.  No evidence of gross GI bleeding. RECOMMENDATIONS:  1. Agree with present management with Protonix. 2.  Parenteral analgesics and antiemetics as needed. 3.  May advance diet as tolerated. 4.  No need for repeat EGD at this point. 5.  The patient has been requested to have an outpatient followup  colonoscopy performed (the patient to call the office for appointment). 6.  The case and plan has been discussed in detail with the patient and  all her questions have been answered. 7.  We will check CBC, CMP, and lipase level in a.m.  8.  The case and plan also has been discussed with the patient's bedside  RN.         Millie Zhou MD    D: 07/10/2023 11:24:11       T: 07/10/2023 11:28:07     [id number]  Job#: [id number]     Doc#: [id number]    CC:
has not had outpatient work up and testing for seizure so unable to fully exclude epileptic seizure however feel this is much less likely. No need for neuro imaging  Do not plan on routine EEG  Continue home dose Keppra 1000 mg twice daily  Follow up outpatient for ambulatory EEG and potentially EMU stay. Do not drive until cleared by your neurologist. No tub baths or swimming. No operating heavy or dangerous equipment. Do not use ladders. No further recommendations, we will sign off at this time. Patient was discussed with attending neurologist Dr. Rossi Godinez             Thank you for allowing us to participate in the care of your patient. If there are any questions regarding evaluation please feel free to contact us. Rory Alaina, APRN - [place], 7/10/2023     ------------------------------------    Attending Addendum:    I have discussed this patient with the mid-level provider. I have personally seen and examined the patient and reviewed the diagnostic testing and any changes in the history or physical exam are documented above. I agree with the plan of care as documented. I have evaluated/treated an acute on chronic illness/injury that poses threat to life or bodily function and/or Chronic illness with severe exacerbation, or treatment of side effect in this encounter. I have performed a substantive portion of this shared visit, with more than 50% of the time spent in face-to-face and in direct patient care, including obtaining critical elements of the history, performing a physical exam, reviewing laboratory and radiological data, medical decision-making, coordinating patient care, discussing the plan of care with the patient, and documentation. The above was discussed and reviewed with the KEN. Patient seen and evaluated in the ED. Events sound most consistent with nonepileptiform events. Unclear why Keppra has been increased.  EEG in the past nonepileptiform but she has not followed up

## 2023-07-11 VITALS
OXYGEN SATURATION: 91 % | HEIGHT: 64 IN | HEART RATE: 72 BPM | BODY MASS INDEX: 41.16 KG/M2 | WEIGHT: 241.13 LBS | SYSTOLIC BLOOD PRESSURE: 116 MMHG | RESPIRATION RATE: 16 BRPM | DIASTOLIC BLOOD PRESSURE: 101 MMHG | TEMPERATURE: 98.8 F

## 2023-07-11 LAB
ALBUMIN SERPL-MCNC: 3.8 GM/DL (ref 3.4–5)
ALP BLD-CCNC: 102 IU/L (ref 40–128)
ALT SERPL-CCNC: 14 U/L (ref 10–40)
ANION GAP SERPL CALCULATED.3IONS-SCNC: 6 MMOL/L (ref 4–16)
AST SERPL-CCNC: 17 IU/L (ref 15–37)
BASOPHILS ABSOLUTE: 0 K/CU MM
BASOPHILS RELATIVE PERCENT: 1.3 % (ref 0–1)
BILIRUB SERPL-MCNC: 0.2 MG/DL (ref 0–1)
BUN SERPL-MCNC: 10 MG/DL (ref 6–23)
CALCIUM SERPL-MCNC: 9.8 MG/DL (ref 8.3–10.6)
CHLORIDE BLD-SCNC: 109 MMOL/L (ref 99–110)
CO2: 25 MMOL/L (ref 21–32)
CREAT SERPL-MCNC: 0.6 MG/DL (ref 0.6–1.1)
DIFFERENTIAL TYPE: ABNORMAL
EOSINOPHILS ABSOLUTE: 0.2 K/CU MM
EOSINOPHILS RELATIVE PERCENT: 6 % (ref 0–3)
GFR SERPL CREATININE-BSD FRML MDRD: >60 ML/MIN/1.73M2
GLUCOSE SERPL-MCNC: 91 MG/DL (ref 70–99)
HCT VFR BLD CALC: 30.4 % (ref 37–47)
HEMOGLOBIN: 9.3 GM/DL (ref 12.5–16)
IMMATURE NEUTROPHIL %: 0 % (ref 0–0.43)
LIPASE: 21 IU/L (ref 13–60)
LYMPHOCYTES ABSOLUTE: 1.5 K/CU MM
LYMPHOCYTES RELATIVE PERCENT: 47.8 % (ref 24–44)
MCH RBC QN AUTO: 27 PG (ref 27–31)
MCHC RBC AUTO-ENTMCNC: 30.6 % (ref 32–36)
MCV RBC AUTO: 88.4 FL (ref 78–100)
MONOCYTES ABSOLUTE: 0.2 K/CU MM
MONOCYTES RELATIVE PERCENT: 7.3 % (ref 0–4)
NUCLEATED RBC %: 0 %
PDW BLD-RTO: 13.8 % (ref 11.7–14.9)
PLATELET # BLD: 147 K/CU MM (ref 140–440)
PMV BLD AUTO: 10.3 FL (ref 7.5–11.1)
POTASSIUM SERPL-SCNC: 4.7 MMOL/L (ref 3.5–5.1)
RBC # BLD: 3.44 M/CU MM (ref 4.2–5.4)
SEGMENTED NEUTROPHILS ABSOLUTE COUNT: 1.2 K/CU MM
SEGMENTED NEUTROPHILS RELATIVE PERCENT: 37.6 % (ref 36–66)
SODIUM BLD-SCNC: 140 MMOL/L (ref 135–145)
TOTAL IMMATURE NEUTOROPHIL: 0 K/CU MM
TOTAL NUCLEATED RBC: 0 K/CU MM
TOTAL PROTEIN: 5.4 GM/DL (ref 6.4–8.2)
WBC # BLD: 3.2 K/CU MM (ref 4–10.5)

## 2023-07-11 PROCEDURE — 6360000002 HC RX W HCPCS: Performed by: NURSE PRACTITIONER

## 2023-07-11 PROCEDURE — 6360000002 HC RX W HCPCS: Performed by: INTERNAL MEDICINE

## 2023-07-11 PROCEDURE — 94761 N-INVAS EAR/PLS OXIMETRY MLT: CPT

## 2023-07-11 PROCEDURE — 2580000003 HC RX 258: Performed by: NURSE PRACTITIONER

## 2023-07-11 PROCEDURE — 6370000000 HC RX 637 (ALT 250 FOR IP): Performed by: INTERNAL MEDICINE

## 2023-07-11 PROCEDURE — 6360000002 HC RX W HCPCS: Performed by: EMERGENCY MEDICINE

## 2023-07-11 PROCEDURE — 85025 COMPLETE CBC W/AUTO DIFF WBC: CPT

## 2023-07-11 PROCEDURE — C9113 INJ PANTOPRAZOLE SODIUM, VIA: HCPCS | Performed by: NURSE PRACTITIONER

## 2023-07-11 PROCEDURE — 83690 ASSAY OF LIPASE: CPT

## 2023-07-11 PROCEDURE — 6370000000 HC RX 637 (ALT 250 FOR IP): Performed by: NURSE PRACTITIONER

## 2023-07-11 PROCEDURE — 80053 COMPREHEN METABOLIC PANEL: CPT

## 2023-07-11 PROCEDURE — 36415 COLL VENOUS BLD VENIPUNCTURE: CPT

## 2023-07-11 PROCEDURE — 1200000000 HC SEMI PRIVATE

## 2023-07-11 PROCEDURE — 2580000003 HC RX 258: Performed by: EMERGENCY MEDICINE

## 2023-07-11 RX ORDER — ESTRADIOL 1 MG/1
0.5 TABLET ORAL DAILY
Status: DISCONTINUED | OUTPATIENT
Start: 2023-07-11 | End: 2023-07-12 | Stop reason: HOSPADM

## 2023-07-11 RX ORDER — SENNA AND DOCUSATE SODIUM 50; 8.6 MG/1; MG/1
2 TABLET, FILM COATED ORAL DAILY
Qty: 60 TABLET | Refills: 0 | Status: SHIPPED | OUTPATIENT
Start: 2023-07-11 | End: 2023-08-10

## 2023-07-11 RX ORDER — ENEMA 19; 7 G/133ML; G/133ML
1 ENEMA RECTAL
Status: DISCONTINUED | OUTPATIENT
Start: 2023-07-11 | End: 2023-07-12 | Stop reason: HOSPADM

## 2023-07-11 RX ORDER — BISACODYL 10 MG
10 SUPPOSITORY, RECTAL RECTAL DAILY
Status: DISCONTINUED | OUTPATIENT
Start: 2023-07-11 | End: 2023-07-12 | Stop reason: HOSPADM

## 2023-07-11 RX ORDER — SENNA AND DOCUSATE SODIUM 50; 8.6 MG/1; MG/1
2 TABLET, FILM COATED ORAL DAILY
Status: DISCONTINUED | OUTPATIENT
Start: 2023-07-11 | End: 2023-07-12 | Stop reason: HOSPADM

## 2023-07-11 RX ORDER — DICYCLOMINE HCL 20 MG
20 TABLET ORAL 4 TIMES DAILY PRN
Status: DISCONTINUED | OUTPATIENT
Start: 2023-07-11 | End: 2023-07-12 | Stop reason: HOSPADM

## 2023-07-11 RX ORDER — OXYCODONE HYDROCHLORIDE AND ACETAMINOPHEN 5; 325 MG/1; MG/1
1 TABLET ORAL EVERY 6 HOURS PRN
Status: DISCONTINUED | OUTPATIENT
Start: 2023-07-11 | End: 2023-07-12 | Stop reason: HOSPADM

## 2023-07-11 RX ORDER — SULFASALAZINE 500 MG/1
1000 TABLET ORAL 2 TIMES DAILY
Status: DISCONTINUED | OUTPATIENT
Start: 2023-07-11 | End: 2023-07-12 | Stop reason: HOSPADM

## 2023-07-11 RX ORDER — SCOLOPAMINE TRANSDERMAL SYSTEM 1 MG/1
1 PATCH, EXTENDED RELEASE TRANSDERMAL
Status: DISCONTINUED | OUTPATIENT
Start: 2023-07-11 | End: 2023-07-12 | Stop reason: HOSPADM

## 2023-07-11 RX ORDER — ONDANSETRON 2 MG/ML
4 INJECTION INTRAMUSCULAR; INTRAVENOUS EVERY 6 HOURS PRN
Status: DISCONTINUED | OUTPATIENT
Start: 2023-07-11 | End: 2023-07-12 | Stop reason: HOSPADM

## 2023-07-11 RX ORDER — HYDROXYCHLOROQUINE SULFATE 200 MG/1
200 TABLET, FILM COATED ORAL 2 TIMES DAILY
Status: DISCONTINUED | OUTPATIENT
Start: 2023-07-11 | End: 2023-07-12 | Stop reason: HOSPADM

## 2023-07-11 RX ADMIN — ONDANSETRON 4 MG: 2 INJECTION INTRAMUSCULAR; INTRAVENOUS at 21:53

## 2023-07-11 RX ADMIN — DULOXETINE HYDROCHLORIDE 60 MG: 30 CAPSULE, DELAYED RELEASE ORAL at 09:09

## 2023-07-11 RX ADMIN — GABAPENTIN 800 MG: 400 CAPSULE ORAL at 14:36

## 2023-07-11 RX ADMIN — SENNOSIDES AND DOCUSATE SODIUM 2 TABLET: 50; 8.6 TABLET ORAL at 09:09

## 2023-07-11 RX ADMIN — ENOXAPARIN SODIUM 40 MG: 100 INJECTION SUBCUTANEOUS at 09:14

## 2023-07-11 RX ADMIN — HYDROCORTISONE: 0.01 CREAM TOPICAL at 22:01

## 2023-07-11 RX ADMIN — GABAPENTIN 800 MG: 400 CAPSULE ORAL at 21:53

## 2023-07-11 RX ADMIN — HYDROCORTISONE: 0.01 CREAM TOPICAL at 09:09

## 2023-07-11 RX ADMIN — LEVETIRACETAM 1000 MG: 100 INJECTION, SOLUTION INTRAVENOUS at 00:47

## 2023-07-11 RX ADMIN — NALOXEGOL OXALATE 25 MG: 25 TABLET, FILM COATED ORAL at 09:14

## 2023-07-11 RX ADMIN — GABAPENTIN 800 MG: 400 CAPSULE ORAL at 09:09

## 2023-07-11 RX ADMIN — PANTOPRAZOLE SODIUM 40 MG: 40 INJECTION, POWDER, FOR SOLUTION INTRAVENOUS at 09:47

## 2023-07-11 RX ADMIN — ONDANSETRON 4 MG: 2 INJECTION INTRAMUSCULAR; INTRAVENOUS at 14:36

## 2023-07-11 RX ADMIN — MORPHINE SULFATE 2 MG: 2 INJECTION, SOLUTION INTRAMUSCULAR; INTRAVENOUS at 09:47

## 2023-07-11 RX ADMIN — LEVOTHYROXINE SODIUM 137 MCG: 25 TABLET ORAL at 09:09

## 2023-07-11 RX ADMIN — OXYCODONE HYDROCHLORIDE AND ACETAMINOPHEN 1 TABLET: 5; 325 TABLET ORAL at 14:36

## 2023-07-11 RX ADMIN — HYDROXYCHLOROQUINE SULFATE 200 MG: 200 TABLET ORAL at 16:40

## 2023-07-11 RX ADMIN — DULOXETINE HYDROCHLORIDE 60 MG: 30 CAPSULE, DELAYED RELEASE ORAL at 21:53

## 2023-07-11 RX ADMIN — BISACODYL 10 MG: 10 SUPPOSITORY RECTAL at 16:53

## 2023-07-11 RX ADMIN — SODIUM CHLORIDE, PRESERVATIVE FREE 10 ML: 5 INJECTION INTRAVENOUS at 09:10

## 2023-07-11 RX ADMIN — SODIUM CHLORIDE, PRESERVATIVE FREE 10 ML: 5 INJECTION INTRAVENOUS at 22:03

## 2023-07-11 RX ADMIN — LEVETIRACETAM 1000 MG: 100 INJECTION, SOLUTION INTRAVENOUS at 12:36

## 2023-07-11 RX ADMIN — MORPHINE SULFATE 2 MG: 2 INJECTION, SOLUTION INTRAMUSCULAR; INTRAVENOUS at 03:48

## 2023-07-11 RX ADMIN — ESTRADIOL 0.5 MG: 1 TABLET ORAL at 16:49

## 2023-07-11 RX ADMIN — SULFASALAZINE 1000 MG: 500 TABLET ORAL at 16:41

## 2023-07-11 RX ADMIN — OXYCODONE HYDROCHLORIDE AND ACETAMINOPHEN 1 TABLET: 5; 325 TABLET ORAL at 21:53

## 2023-07-11 ASSESSMENT — PAIN DESCRIPTION - DESCRIPTORS
DESCRIPTORS: ACHING
DESCRIPTORS: ACHING;DISCOMFORT

## 2023-07-11 ASSESSMENT — ENCOUNTER SYMPTOMS
DIARRHEA: 0
CONSTIPATION: 0
ABDOMINAL PAIN: 1
STRIDOR: 0
BACK PAIN: 0
ABDOMINAL DISTENTION: 0
BLOOD IN STOOL: 0
VOMITING: 0
SHORTNESS OF BREATH: 0
CHEST TIGHTNESS: 0
COUGH: 0
CHOKING: 0
EYE PAIN: 0
WHEEZING: 0
NAUSEA: 0

## 2023-07-11 ASSESSMENT — PAIN SCALES - WONG BAKER
WONGBAKER_NUMERICALRESPONSE: 4

## 2023-07-11 ASSESSMENT — PAIN SCALES - GENERAL
PAINLEVEL_OUTOF10: 8
PAINLEVEL_OUTOF10: 7
PAINLEVEL_OUTOF10: 7
PAINLEVEL_OUTOF10: 8
PAINLEVEL_OUTOF10: 8
PAINLEVEL_OUTOF10: 6
PAINLEVEL_OUTOF10: 8

## 2023-07-11 ASSESSMENT — PAIN DESCRIPTION - ORIENTATION
ORIENTATION: MID
ORIENTATION: MID

## 2023-07-11 ASSESSMENT — PAIN DESCRIPTION - LOCATION
LOCATION: ABDOMEN

## 2023-07-11 NOTE — DISCHARGE INSTRUCTIONS
Internal Medicine Discharge Instruction    Discharge to:  Home  Diet: Regular   Activity: As tolerated       Be compliant with medications  Please take medications as prescribed   *** Please get blood work done in a few days        Electronically signed by Nay Hinton MD on 7/11/2023 at 8:29 AM

## 2023-07-11 NOTE — CARE COORDINATION
07/11/23 7192   Service Assessment   Patient Orientation Alert and Oriented;Person;Place;Situation   Cognition Alert   History Provided By Patient   Primary [address] is: Legal Next of Kin   PCP Verified by CM Yes   Last Visit to PCP Within last 6 months   Prior Functional Level Independent in ADLs/IADLs   Current Functional Level Independent in ADLs/IADLs   Can patient return to prior living arrangement Yes   Ability to make needs known: Good     CM into see pt to initiate a safe discharge plan. Cm introduced self and explained role of CM. Pt is kind, alert and oriented. Pt lives with a friend in a one floor home. Pt is independent at home and uses no DME. Pt has a PCP/ insurance and able to obtain meds. Pt is able to drive. Pt offered home care and declined. Discharge plan is for pt to return home with friend. Pt has PCP/insurance. Independent. No needs.   CM provided card and encouraged to call for any needs or concern. CM is available if any needs arise.

## 2023-07-12 ENCOUNTER — TELEPHONE (OUTPATIENT)
Dept: ORTHOPEDIC SURGERY | Age: 53
End: 2023-07-12

## 2023-07-12 ENCOUNTER — TELEPHONE (OUTPATIENT)
Dept: INTERNAL MEDICINE CLINIC | Age: 53
End: 2023-07-12

## 2023-07-12 DIAGNOSIS — M54.16 LUMBAR RADICULOPATHY: Primary | ICD-10-CM

## 2023-07-12 DIAGNOSIS — F41.9 ANXIETY: ICD-10-CM

## 2023-07-12 RX ORDER — DIAZEPAM 2 MG/1
2 TABLET ORAL EVERY 8 HOURS PRN
Qty: 1 TABLET | Refills: 0 | Status: SHIPPED | OUTPATIENT
Start: 2023-07-12 | End: 2023-07-13

## 2023-07-12 NOTE — PROGRESS NOTES
APRN notified of pt wanting to leave AMA. In house MD and RN supervisor made aware.
APRN notified of pt washing up in bathroom and reporting \"itching\" on her back and requesting IV Benadryl. RN at bedside reports no erythema, hives, or whelps on skin. Ordered topical hydrocortisone cream 1% BID to include now. JACKIE San aware of new order. Continue to monitor due to allergies listed.
Informed by Primary RN that Ms. Jose Saenz wants to leave AMA. Patient A&Ox4. Hospitalist informed, as well as House Nursing Supervisor. Hospitalist came to bedside to speak w/ Patient, but She was showering. She will be provided w/ AMA form to sign from Primary RN.
MICHAEL notified of pt crying hysterically and claiming Corrinne Collier has thrown up the pain medicaiton. \" MAR reviewed. RN states pt is continuously calling [facility] phone. Advised to continue to monitor. No additional pain medication or changes in pain regimen at this time.
Nutrition notified of patients diet upgrade at this time.      RAKEL BermanN, RN   1:13 PM
Outpatient Pharmacy Progress Note for Meds-to-Beds    Total number of Prescriptions Filled: 1  The following medications were dispensed to the patient during the discharge process:  Senna-S    Additional Documentation:  Medication(s) were delivered to the patient's room prior to discharge      Thank you for letting us serve your patients.   [address]    [address] [place] [address], [address] [place] [address]    Phone: [phone]    Fax: [phone]
Patient to unit from OBS via WC. Oriented to room and staff, bedside table and call light in reach, all questions answered at this time.
Pt consistently continuing to request additional pain meds. Provider was contacted and declined additional orders.  Pt notified and called nurse in twice more to discuss
Pt left AMA. Signed form, mediport was de accessed per RN. Pt declined a wheelchair and walked down to the lobby. Py stated she is seeing her doctor tomorrow and she has percocet at home if we are not going to control her pain. RN reached out to provider multiple times on day shift and evening shift per pt request with no new orders.  A+ox4 gait steady
Pt states she is vomiting up her pain medicine. Given zofran prior to admin , RN did not see pain medicine after emesis x1.  Provider notified, no new orders
Report called to Charge nurse Myriam Krueger at this time all questions answered.
Upon entering the room patient began asking about her pain meds, wether or not they were still available q 2 hours, I informed her they were q 6 now, she also asked about IV phenergan, I informed her there was no longer an order for that either that she had order FOR IV Zofran, She voiced understanding but was not happy and said she will just have to have night shift nurse contact Provider to make changes to her meds.
V2.0  [facility] Hospitalist Progress Note      Name:  Napoleon Beasley /Age/Sex: 1970  (45 y.o. female)   MRN & CSN:  [id number] & [id number] Encounter Date/Time: 2023 1:07 PM EDT    Location:  9174/7574-J PCP: Lennox Congo, MD       Hospital Day: 3    Assessment and Plan:   Napoleon Beasley is a 45 y.o. female with pmh of morbid obese, chronic pancreatitis gastric bypass, hypothyroidism, cerebral seizures, chronic pain, drug-seeking behavior, septic arthritis in left knee who presents with Intractable nausea and vomiting. Plan:  Intractable Nausea and Vomitting  Generalized  abdominal pain 2/2 likely Severe Constipation 2/2 likely opioid use               CT abdomen pelvis without contrast negative for acute abnormality in the abdomen or pelvis. No bowel obstruction seen. Postsurgical changes from gastric bypass and cholecystectomy seen. CT abdomen personally reviewed, large stool burden present               Patient was able to keep small amount of clear liquid today              Appreciate GI input: Had EGD last year, no ulcer. No EGD at this time   Start on senakot   Add dulcolax suppository daily with movantik 25mg PO daily. Add fleet enema PRN. Continue scopolamine patch    Advance diet to GI bland. GERD with history of  erosive gastritis               PPI BID     Fibromyalgia  Arthritis               [facility] report reviewed, on oxycodone 5 mg q6h - resume                            Continue cymbalta, neurotin  800mg TID    Drug seeking behavior                Pt persistent asking for narcotic medications               Continue home pain meds. DO NOT ESCALATE. Pseudoseizures              Keppra IV 1000mg BID currently, neuro plans to wean keppra as outpatient. Hypothyroidism    On home levothyroxine 137mcg daily     Class III Obesity    BMI 41.39    Diet ADULT DIET;  Regular; GI [place] (GERD/Peptic Ulcer)   DVT Prophylaxis [x] Lovenox, []  Heparin, [] SCDs,
surgically absent. Nonobstructing intrarenal calculi. Similar appearance of the kidneys. GI/Bowel: No bowel obstruction is seen. No evidence to suggest acute appendicitis. Postsurgical changes from a gastric bypass. Pelvis: Urinary bladder is unremarkable. Peritoneum/Retroperitoneum: No lymphadenopathy is seen. No intraperitoneal free air or significant free fluid. Bones/Soft Tissues: No acute bony abnormality is seen. No acute abnormality is identified in the abdomen or pelvis. No bowel obstruction is seen. Postsurgical changes from gastric bypass and cholecystectomy. Pneumobilia, seen on prior studies as well.        Electronically signed by MICHAEL Zamudio CNP on 7/10/2023 at 3:45 PM

## 2023-07-12 NOTE — DISCHARGE SUMMARY
WBC 4.0  --  3.2*   HCT 32.1*  --  30.4*     --  147       IMAGING:  CT ABDOMEN PELVIS WO CONTRAST Additional Contrast? None    Result Date: 7/9/2023  EXAMINATION: CT OF THE ABDOMEN AND PELVIS WITHOUT CONTRAST 7/9/2023 3:38 pm TECHNIQUE: CT of the abdomen and pelvis was performed without the administration of intravenous contrast. Multiplanar reformatted images are provided for review. Automated exposure control, iterative reconstruction, and/or weight based adjustment of the mA/kV was utilized to reduce the radiation dose to as low as reasonably achievable. COMPARISON: 06/10/2023 HISTORY: ORDERING SYSTEM PROVIDED HISTORY: intractable n/v TECHNOLOGIST PROVIDED HISTORY: Reason for exam:->intractable n/v Additional Contrast?->None Decision Support Exception - unselect if not a suspected or confirmed emergency medical condition->Emergency Medical Condition (MA) Is the patient pregnant?->No Reason for Exam: intractable n/v FINDINGS: Lower Chest:  Visualized portion of the lower chest demonstrates no acute abnormality. Organs: Limited evaluation of the intra-abdominal organs given the lack of intravenous contrast.  Within this limitation, the liver, spleen, pancreas, adrenal glands, and kidneys demonstrate no acute abnormality. There is pneumobilia, seen on prior studies. The gallbladder is surgically absent. Nonobstructing intrarenal calculi. Similar appearance of the kidneys. GI/Bowel: No bowel obstruction is seen. No evidence to suggest acute appendicitis. Postsurgical changes from a gastric bypass. Pelvis: Urinary bladder is unremarkable. Peritoneum/Retroperitoneum: No lymphadenopathy is seen. No intraperitoneal free air or significant free fluid. Bones/Soft Tissues: No acute bony abnormality is seen. No acute abnormality is identified in the abdomen or pelvis. No bowel obstruction is seen. Postsurgical changes from gastric bypass and cholecystectomy. Pneumobilia, seen on prior studies as well.

## 2023-07-12 NOTE — TELEPHONE ENCOUNTER
Care Transitions Initial Follow Up Call    Outreach made within 2 business days of discharge: Yes    Patient: Silvia Thurman Patient : 1970   MRN: [id number]  Reason for Admission: There are no discharge diagnoses documented for the most recent discharge. Discharge Date: 23       Spoke with: Gregg Alexander     Discharge department/facility: [facility]-- she left AMA    TCM Interactive Patient Contact:  Was patient able to fill all prescriptions: No: she needs a refill on hydroxyzine. Was patient instructed to bring all medications to the follow-up visit: Yes  Is patient taking all medications as directed in the discharge summary? Yes  Does patient understand their discharge instructions: Yes  Does patient have questions or concerns that need addressed prior to 7-14 day follow up office visit: no    She is following with us on 23. She is scheduled for a MRI, mammogram, and a follow up with Dr Herb Litten before returning here.        Scheduled appointment with PCP within 7-14 days    Follow Up  Future Appointments   Date Time Provider [address] [place] [address]   2023  4:00 PM [facility] MRI ROOM 1 [facility] Imaging   2023  8:40 AM [facility] MAMMO ROOM 2 [facility] Imaging   2023  9:10 AM [facility] US ROOM 2 [address] [place] [address] Imaging   2023  2:15 PM Fredis Dior MD [facility]   2023  9:15 AM Stefano Kehr, MD [facility]   2023  9:30 AM [address] [employer] [facility], MED ONC NURSE [facility] MED ONC [place]   2023 11:00 AM Gabby Lopez DO [facility]       Emily Rankin

## 2023-07-13 RX ORDER — HYDROXYZINE 50 MG/1
50 TABLET, FILM COATED ORAL 2 TIMES DAILY PRN
Qty: 30 TABLET | Refills: 0 | Status: SHIPPED | OUTPATIENT
Start: 2023-07-13

## 2023-07-18 ENCOUNTER — OFFICE VISIT (OUTPATIENT)
Dept: RHEUMATOLOGY | Age: 53
End: 2023-07-18
Payer: COMMERCIAL

## 2023-07-18 VITALS
WEIGHT: 236 LBS | HEART RATE: 63 BPM | OXYGEN SATURATION: 96 % | BODY MASS INDEX: 40.51 KG/M2 | SYSTOLIC BLOOD PRESSURE: 110 MMHG | DIASTOLIC BLOOD PRESSURE: 75 MMHG

## 2023-07-18 DIAGNOSIS — Z79.899 HIGH RISK MEDICATION USE: ICD-10-CM

## 2023-07-18 DIAGNOSIS — Z79.899 ENCOUNTER FOR MONITORING OF HYDROXYCHLOROQUINE THERAPY: ICD-10-CM

## 2023-07-18 DIAGNOSIS — M05.79 RHEUMATOID ARTHRITIS INVOLVING MULTIPLE SITES WITH POSITIVE RHEUMATOID FACTOR (HCC): Primary | ICD-10-CM

## 2023-07-18 DIAGNOSIS — Z51.81 ENCOUNTER FOR MONITORING OF HYDROXYCHLOROQUINE THERAPY: ICD-10-CM

## 2023-07-18 PROCEDURE — 99214 OFFICE O/P EST MOD 30 MIN: CPT | Performed by: STUDENT IN AN ORGANIZED HEALTH CARE EDUCATION/TRAINING PROGRAM

## 2023-07-18 PROCEDURE — 3017F COLORECTAL CA SCREEN DOC REV: CPT | Performed by: STUDENT IN AN ORGANIZED HEALTH CARE EDUCATION/TRAINING PROGRAM

## 2023-07-18 PROCEDURE — 3074F SYST BP LT 130 MM HG: CPT | Performed by: STUDENT IN AN ORGANIZED HEALTH CARE EDUCATION/TRAINING PROGRAM

## 2023-07-18 PROCEDURE — 1036F TOBACCO NON-USER: CPT | Performed by: STUDENT IN AN ORGANIZED HEALTH CARE EDUCATION/TRAINING PROGRAM

## 2023-07-18 PROCEDURE — G8417 CALC BMI ABV UP PARAM F/U: HCPCS | Performed by: STUDENT IN AN ORGANIZED HEALTH CARE EDUCATION/TRAINING PROGRAM

## 2023-07-18 PROCEDURE — 1111F DSCHRG MED/CURRENT MED MERGE: CPT | Performed by: STUDENT IN AN ORGANIZED HEALTH CARE EDUCATION/TRAINING PROGRAM

## 2023-07-18 PROCEDURE — G8427 DOCREV CUR MEDS BY ELIG CLIN: HCPCS | Performed by: STUDENT IN AN ORGANIZED HEALTH CARE EDUCATION/TRAINING PROGRAM

## 2023-07-18 PROCEDURE — 3078F DIAST BP <80 MM HG: CPT | Performed by: STUDENT IN AN ORGANIZED HEALTH CARE EDUCATION/TRAINING PROGRAM

## 2023-07-18 RX ORDER — ADALIMUMAB 40MG/0.4ML
40 KIT SUBCUTANEOUS
Qty: 2 EACH | Refills: 1 | Status: ACTIVE | OUTPATIENT
Start: 2023-07-18

## 2023-07-18 NOTE — PATIENT INSTRUCTIONS
Start humira every 14 days when  you receive it in the mail  Cont Plaquenil 1 tablet twice daily  Stop SSZ when you receive humira  RTC in 2 months

## 2023-07-20 ENCOUNTER — OFFICE VISIT (OUTPATIENT)
Dept: INTERNAL MEDICINE CLINIC | Age: 53
End: 2023-07-20

## 2023-07-20 VITALS
OXYGEN SATURATION: 95 % | HEART RATE: 83 BPM | BODY MASS INDEX: 40.8 KG/M2 | WEIGHT: 239 LBS | DIASTOLIC BLOOD PRESSURE: 77 MMHG | HEIGHT: 64 IN | SYSTOLIC BLOOD PRESSURE: 138 MMHG

## 2023-07-20 DIAGNOSIS — Z09 HOSPITAL DISCHARGE FOLLOW-UP: ICD-10-CM

## 2023-07-20 DIAGNOSIS — K29.60 EROSIVE GASTRITIS: ICD-10-CM

## 2023-07-20 DIAGNOSIS — M54.16 LUMBAR BACK PAIN WITH RADICULOPATHY AFFECTING LEFT LOWER EXTREMITY: ICD-10-CM

## 2023-07-20 DIAGNOSIS — E66.01 MORBID OBESITY (HCC): ICD-10-CM

## 2023-07-20 DIAGNOSIS — Z87.39 HISTORY OF FIBROMYALGIA: ICD-10-CM

## 2023-07-20 DIAGNOSIS — K21.9 GASTROESOPHAGEAL REFLUX DISEASE WITHOUT ESOPHAGITIS: ICD-10-CM

## 2023-07-20 DIAGNOSIS — R11.2 INTRACTABLE NAUSEA AND VOMITING: Primary | ICD-10-CM

## 2023-07-20 DIAGNOSIS — R10.84 GENERALIZED ABDOMINAL PAIN: ICD-10-CM

## 2023-07-20 DIAGNOSIS — M25.562 CHRONIC PAIN OF LEFT KNEE: ICD-10-CM

## 2023-07-20 DIAGNOSIS — I10 PRIMARY HYPERTENSION: ICD-10-CM

## 2023-07-20 DIAGNOSIS — G89.29 CHRONIC PAIN OF LEFT KNEE: ICD-10-CM

## 2023-07-20 DIAGNOSIS — E03.4 HYPOTHYROIDISM DUE TO ACQUIRED ATROPHY OF THYROID: ICD-10-CM

## 2023-07-20 DIAGNOSIS — M05.79 RHEUMATOID ARTHRITIS INVOLVING MULTIPLE SITES WITH POSITIVE RHEUMATOID FACTOR (HCC): ICD-10-CM

## 2023-07-20 DIAGNOSIS — Z98.84 HISTORY OF ROUX-EN-Y GASTRIC BYPASS: ICD-10-CM

## 2023-07-20 DIAGNOSIS — J44.9 CHRONIC OBSTRUCTIVE PULMONARY DISEASE, UNSPECIFIED COPD TYPE (HCC): ICD-10-CM

## 2023-07-20 DIAGNOSIS — F44.5 PSYCHOGENIC NONEPILEPTIC SEIZURE: ICD-10-CM

## 2023-07-20 DIAGNOSIS — F32.A CHRONIC DEPRESSION: ICD-10-CM

## 2023-07-20 DIAGNOSIS — D64.9 NORMOCYTIC ANEMIA: ICD-10-CM

## 2023-07-20 RX ORDER — LEVOTHYROXINE SODIUM 137 UG/1
137 TABLET ORAL DAILY
Qty: 30 TABLET | Refills: 3 | Status: SHIPPED | OUTPATIENT
Start: 2023-07-20

## 2023-07-20 RX ORDER — SCOLOPAMINE TRANSDERMAL SYSTEM 1 MG/1
PATCH, EXTENDED RELEASE TRANSDERMAL
Qty: 10 PATCH | Refills: 1 | Status: SHIPPED | OUTPATIENT
Start: 2023-07-20

## 2023-07-20 NOTE — PROGRESS NOTES
Post-Discharge Transitional Care  Follow Up      Lauryn Pope   YOB: 1970    Date of Office Visit:  7/20/2023  Date of Hospital Admission: 7/9/23  Date of Hospital Discharge: 7/12/23  Risk of hospital readmission (high >=14%. Medium >=10%) :Readmission Risk Score: 15.6      Care management risk score Rising risk (score 2-5) and Complex Care (Scores >=6): No Risk Score On File     Non face to face  following discharge, date last encounter closed (first attempt may have been earlier): 07/12/2023    Call initiated 2 business days of discharge: Yes    ASSESSMENT/PLAN:   Intractable nausea and vomiting  Improved. Advised to continue to follow with her GI doctor as per his recommendations. Generalized abdominal pain  Gastroesophageal reflux disease without esophagitis  Erosive gastritis  Continue Protonix 40 mg twice daily and continue to follow-up with her GI doctor as per his recommendations. Normocytic anemia  Significant anemia. We will add ferrous sulfate 325 mg twice daily  Monitor hemoglobin closely    Primary hypertension  Not on any medication for it at this time. BP is stable without any medications. Advised low-salt diet, exercise and weight loss    Hypothyroidism due to acquired atrophy of thyroid  -     levothyroxine (SYNTHROID) 137 MCG tablet; Take 1 tablet by mouth daily, Disp-30 tablet, R-3Normal      History of fibromyalgia  Advised to continue to follow with her rheumatologist and pain management. Psychogenic nonepileptic seizure  Advised to follow-up with her neurologist.  Frutoso Hasten for now    Morbid obesity ([address])  History of Jet-en-Y gastric bypass  Advised diet, exercise and weight loss. Advised to continue to follow with her bariatric surgeon as per his admissions.     Chronic obstructive pulmonary disease, unspecified COPD type ([address])  Not bothering her much and she is not on any medication for it at this time    Chronic depression  Continue

## 2023-07-21 PROBLEM — M05.79 RHEUMATOID ARTHRITIS INVOLVING MULTIPLE SITES WITH POSITIVE RHEUMATOID FACTOR (HCC): Status: ACTIVE | Noted: 2023-07-21

## 2023-07-21 PROBLEM — Z09 HOSPITAL DISCHARGE FOLLOW-UP: Status: ACTIVE | Noted: 2023-07-21

## 2023-07-21 PROBLEM — Z87.39 HISTORY OF FIBROMYALGIA: Status: ACTIVE | Noted: 2019-07-14

## 2023-07-21 RX ORDER — FERROUS SULFATE 325(65) MG
325 TABLET ORAL 2 TIMES DAILY
Qty: 60 TABLET | Refills: 2 | Status: SHIPPED | OUTPATIENT
Start: 2023-07-21

## 2023-07-25 ENCOUNTER — HOSPITAL ENCOUNTER (OUTPATIENT)
Dept: MRI IMAGING | Age: 53
Discharge: HOME | End: 2023-07-25
Attending: STUDENT IN AN ORGANIZED HEALTH CARE EDUCATION/TRAINING PROGRAM
Payer: COMMERCIAL

## 2023-07-25 DIAGNOSIS — M54.16 LUMBAR RADICULOPATHY: ICD-10-CM

## 2023-07-25 DIAGNOSIS — M25.562 ACUTE PAIN OF LEFT KNEE: ICD-10-CM

## 2023-07-25 PROCEDURE — 72148 MRI LUMBAR SPINE W/O DYE: CPT

## 2023-07-26 ENCOUNTER — HOSPITAL ENCOUNTER (OUTPATIENT)
Dept: INFUSION THERAPY | Age: 53
Discharge: HOME | End: 2023-07-26
Payer: COMMERCIAL

## 2023-07-26 ENCOUNTER — OFFICE VISIT (OUTPATIENT)
Dept: ONCOLOGY | Age: 53
End: 2023-07-26
Payer: COMMERCIAL

## 2023-07-26 ENCOUNTER — HOSPITAL ENCOUNTER (OUTPATIENT)
Dept: ULTRASOUND IMAGING | Age: 53
Discharge: HOME | End: 2023-07-26
Payer: COMMERCIAL

## 2023-07-26 ENCOUNTER — HOSPITAL ENCOUNTER (OUTPATIENT)
Dept: WOMENS IMAGING | Age: 53
Discharge: HOME | End: 2023-07-26
Payer: COMMERCIAL

## 2023-07-26 VITALS
HEIGHT: 64 IN | OXYGEN SATURATION: 96 % | BODY MASS INDEX: 40.46 KG/M2 | DIASTOLIC BLOOD PRESSURE: 60 MMHG | SYSTOLIC BLOOD PRESSURE: 126 MMHG | HEART RATE: 68 BPM | TEMPERATURE: 97.8 F | WEIGHT: 237 LBS

## 2023-07-26 VITALS — HEIGHT: 64 IN | WEIGHT: 236 LBS | BODY MASS INDEX: 40.29 KG/M2

## 2023-07-26 DIAGNOSIS — N64.3 GALACTORRHEA: ICD-10-CM

## 2023-07-26 DIAGNOSIS — N63.11 MASS OF UPPER OUTER QUADRANT OF RIGHT BREAST: ICD-10-CM

## 2023-07-26 DIAGNOSIS — R53.83 OTHER FATIGUE: ICD-10-CM

## 2023-07-26 DIAGNOSIS — Z80.3 FAMILY HISTORY OF BREAST CANCER: ICD-10-CM

## 2023-07-26 DIAGNOSIS — N63.11 MASS OF UPPER OUTER QUADRANT OF RIGHT BREAST: Primary | ICD-10-CM

## 2023-07-26 DIAGNOSIS — D64.9 ANEMIA, UNSPECIFIED TYPE: ICD-10-CM

## 2023-07-26 LAB
BASOPHILS ABSOLUTE: 0 K/CU MM
BASOPHILS RELATIVE PERCENT: 0.5 % (ref 0–1)
DIFFERENTIAL TYPE: ABNORMAL
EOSINOPHILS ABSOLUTE: 0.3 K/CU MM
EOSINOPHILS RELATIVE PERCENT: 6.2 % (ref 0–3)
FERRITIN: 29 NG/ML (ref 15–150)
FOLATE SERPL-MCNC: 10.4 NG/ML (ref 3.1–17.5)
HCT VFR BLD CALC: 35.4 % (ref 37–47)
HEMOGLOBIN: 11 GM/DL (ref 12.5–16)
IRON: 51 UG/DL (ref 37–145)
LYMPHOCYTES ABSOLUTE: 2 K/CU MM
LYMPHOCYTES RELATIVE PERCENT: 50.6 % (ref 24–44)
MCH RBC QN AUTO: 27.2 PG (ref 27–31)
MCHC RBC AUTO-ENTMCNC: 31.1 % (ref 32–36)
MCV RBC AUTO: 87.6 FL (ref 78–100)
MONOCYTES ABSOLUTE: 0.4 K/CU MM
MONOCYTES RELATIVE PERCENT: 8.7 % (ref 0–4)
PCT TRANSFERRIN: 17 % (ref 10–44)
PDW BLD-RTO: 14 % (ref 11.7–14.9)
PLATELET # BLD: 204 K/CU MM (ref 140–440)
PMV BLD AUTO: 9.6 FL (ref 7.5–11.1)
RBC # BLD: 4.04 M/CU MM (ref 4.2–5.4)
SEGMENTED NEUTROPHILS ABSOLUTE COUNT: 1.4 K/CU MM
SEGMENTED NEUTROPHILS RELATIVE PERCENT: 34 % (ref 36–66)
T4 FREE SERPL-MCNC: 1 NG/DL (ref 0.9–1.8)
TOTAL IRON BINDING CAPACITY: 302 UG/DL (ref 250–450)
TSH SERPL DL<=0.005 MIU/L-ACNC: 15.1 UIU/ML (ref 0.27–4.2)
UNSATURATED IRON BINDING CAPACITY: 251 UG/DL (ref 110–370)
VITAMIN B-12: 436.8 PG/ML (ref 211–911)
WBC # BLD: 4 K/CU MM (ref 4–10.5)

## 2023-07-26 PROCEDURE — 3078F DIAST BP <80 MM HG: CPT | Performed by: INTERNAL MEDICINE

## 2023-07-26 PROCEDURE — 1111F DSCHRG MED/CURRENT MED MERGE: CPT | Performed by: INTERNAL MEDICINE

## 2023-07-26 PROCEDURE — 83550 IRON BINDING TEST: CPT

## 2023-07-26 PROCEDURE — 84439 ASSAY OF FREE THYROXINE: CPT

## 2023-07-26 PROCEDURE — G8417 CALC BMI ABV UP PARAM F/U: HCPCS | Performed by: INTERNAL MEDICINE

## 2023-07-26 PROCEDURE — 76642 ULTRASOUND BREAST LIMITED: CPT

## 2023-07-26 PROCEDURE — 82728 ASSAY OF FERRITIN: CPT

## 2023-07-26 PROCEDURE — 99211 OFF/OP EST MAY X REQ PHY/QHP: CPT

## 2023-07-26 PROCEDURE — 36415 COLL VENOUS BLD VENIPUNCTURE: CPT

## 2023-07-26 PROCEDURE — 85025 COMPLETE CBC W/AUTO DIFF WBC: CPT

## 2023-07-26 PROCEDURE — 3017F COLORECTAL CA SCREEN DOC REV: CPT | Performed by: INTERNAL MEDICINE

## 2023-07-26 PROCEDURE — 99214 OFFICE O/P EST MOD 30 MIN: CPT | Performed by: INTERNAL MEDICINE

## 2023-07-26 PROCEDURE — G8427 DOCREV CUR MEDS BY ELIG CLIN: HCPCS | Performed by: INTERNAL MEDICINE

## 2023-07-26 PROCEDURE — 1036F TOBACCO NON-USER: CPT | Performed by: INTERNAL MEDICINE

## 2023-07-26 PROCEDURE — G0279 TOMOSYNTHESIS, MAMMO: HCPCS

## 2023-07-26 PROCEDURE — 3074F SYST BP LT 130 MM HG: CPT | Performed by: INTERNAL MEDICINE

## 2023-07-26 PROCEDURE — 84443 ASSAY THYROID STIM HORMONE: CPT

## 2023-07-26 PROCEDURE — 83540 ASSAY OF IRON: CPT

## 2023-07-26 PROCEDURE — 82746 ASSAY OF FOLIC ACID SERUM: CPT

## 2023-07-26 PROCEDURE — 82607 VITAMIN B-12: CPT

## 2023-07-26 RX ORDER — PROMETHAZINE HYDROCHLORIDE 25 MG/1
25 TABLET ORAL NIGHTLY
COMMUNITY
Start: 2023-06-20

## 2023-07-26 RX ORDER — DULOXETIN HYDROCHLORIDE 30 MG/1
CAPSULE, DELAYED RELEASE ORAL
COMMUNITY
Start: 2023-06-08

## 2023-07-26 RX ORDER — CHLORHEXIDINE GLUCONATE 0.12 MG/ML
RINSE ORAL
COMMUNITY
Start: 2023-07-17

## 2023-07-26 ASSESSMENT — PATIENT HEALTH QUESTIONNAIRE - PHQ9
SUM OF ALL RESPONSES TO PHQ QUESTIONS 1-9: 0
SUM OF ALL RESPONSES TO PHQ QUESTIONS 1-9: 0
2. FEELING DOWN, DEPRESSED OR HOPELESS: 0
SUM OF ALL RESPONSES TO PHQ QUESTIONS 1-9: 0
1. LITTLE INTEREST OR PLEASURE IN DOING THINGS: 0
SUM OF ALL RESPONSES TO PHQ QUESTIONS 1-9: 0
SUM OF ALL RESPONSES TO PHQ9 QUESTIONS 1 & 2: 0

## 2023-07-26 NOTE — PROGRESS NOTES
MA Rooming Questions  Patient: Keith Fung  MRN: [id number]    Date: 7/26/2023        1. Do you have any new issues? yes - patient is having some stomach issues, patient also noticed a lump in her right breast \"feels like a marble\"         2. Do you need any refills on medications?    no    3. Have you had any imaging done since your last visit? yes - mri     4. Have you been hospitalized or seen in the emergency room since your last visit here?   no    5. Did the patient have a depression screening completed today?  Yes    PHQ-9 Total Score: 0 (7/26/2023  9:33 AM)       PHQ-9 Given to (if applicable):               PHQ-9 Score (if applicable):                     [] Positive     []  Negative              Does question #9 need addressed (if applicable)                     [] Yes    []  No               Schuyler De CMA

## 2023-07-27 DIAGNOSIS — E03.4 HYPOTHYROIDISM DUE TO ACQUIRED ATROPHY OF THYROID: Primary | ICD-10-CM

## 2023-07-27 RX ORDER — LEVOTHYROXINE SODIUM 0.15 MG/1
150 TABLET ORAL DAILY
Qty: 90 TABLET | Refills: 1 | Status: SHIPPED | OUTPATIENT
Start: 2023-07-27

## 2023-07-27 NOTE — PROGRESS NOTES
Contacted pt at provided # [phone] to inform that Dr. Anisa Bustos is increasing her levothyroxine dose to 150mcg, pt stated understanding and requested script be sent to [facility] as she has an eye appointment later today, script sent per pt request, pt denied questions

## 2023-07-31 DIAGNOSIS — R92.8 ABNORMAL FINDING ON BREAST IMAGING: Primary | ICD-10-CM

## 2023-08-01 ENCOUNTER — OFFICE VISIT (OUTPATIENT)
Dept: FAMILY MEDICINE CLINIC | Age: 53
End: 2023-08-01
Payer: COMMERCIAL

## 2023-08-01 VITALS
TEMPERATURE: 97.4 F | BODY MASS INDEX: 40.54 KG/M2 | SYSTOLIC BLOOD PRESSURE: 130 MMHG | HEART RATE: 71 BPM | DIASTOLIC BLOOD PRESSURE: 92 MMHG | OXYGEN SATURATION: 93 % | WEIGHT: 236.2 LBS

## 2023-08-01 DIAGNOSIS — R19.8 GI PROBLEM: Primary | ICD-10-CM

## 2023-08-01 PROCEDURE — 1036F TOBACCO NON-USER: CPT | Performed by: NURSE PRACTITIONER

## 2023-08-01 PROCEDURE — 1111F DSCHRG MED/CURRENT MED MERGE: CPT | Performed by: NURSE PRACTITIONER

## 2023-08-01 PROCEDURE — 3075F SYST BP GE 130 - 139MM HG: CPT | Performed by: NURSE PRACTITIONER

## 2023-08-01 PROCEDURE — G8427 DOCREV CUR MEDS BY ELIG CLIN: HCPCS | Performed by: NURSE PRACTITIONER

## 2023-08-01 PROCEDURE — 99213 OFFICE O/P EST LOW 20 MIN: CPT | Performed by: NURSE PRACTITIONER

## 2023-08-01 PROCEDURE — G8417 CALC BMI ABV UP PARAM F/U: HCPCS | Performed by: NURSE PRACTITIONER

## 2023-08-01 PROCEDURE — 3017F COLORECTAL CA SCREEN DOC REV: CPT | Performed by: NURSE PRACTITIONER

## 2023-08-01 PROCEDURE — 3080F DIAST BP >= 90 MM HG: CPT | Performed by: NURSE PRACTITIONER

## 2023-08-01 RX ORDER — PROMETHAZINE HYDROCHLORIDE 25 MG/1
25 TABLET ORAL NIGHTLY
Status: CANCELLED | OUTPATIENT
Start: 2023-08-01

## 2023-08-01 RX ORDER — PROMETHAZINE HYDROCHLORIDE 25 MG/1
25 TABLET ORAL EVERY 8 HOURS PRN
Qty: 30 TABLET | Refills: 0 | Status: SHIPPED | OUTPATIENT
Start: 2023-08-01

## 2023-08-01 RX ORDER — ALBUTEROL SULFATE 90 UG/1
2 AEROSOL, METERED RESPIRATORY (INHALATION) EVERY 6 HOURS PRN
Qty: 1 EACH | Refills: 0 | Status: SHIPPED | OUTPATIENT
Start: 2023-08-01

## 2023-08-01 RX ORDER — DICYCLOMINE HCL 20 MG
20 TABLET ORAL 4 TIMES DAILY PRN
Qty: 54 TABLET | Refills: 0 | Status: SHIPPED | OUTPATIENT
Start: 2023-08-01 | End: 2023-08-15

## 2023-08-01 RX ORDER — DICYCLOMINE HCL 20 MG
20 TABLET ORAL 4 TIMES DAILY PRN
Qty: 20 TABLET | Refills: 0 | Status: CANCELLED | OUTPATIENT
Start: 2023-08-01

## 2023-08-01 ASSESSMENT — ENCOUNTER SYMPTOMS
CHEST TIGHTNESS: 0
DIARRHEA: 1
NAUSEA: 1
RHINORRHEA: 0
ABDOMINAL PAIN: 0
VOMITING: 1
SHORTNESS OF BREATH: 0
SINUS PAIN: 0
SINUS PRESSURE: 0
SORE THROAT: 0
COUGH: 0

## 2023-08-01 NOTE — PROGRESS NOTES
Andre Poep   45 y.o.  female  [id number]      Chief Complaint   Patient presents with    GI Problem    Medication Refill        Subjective:  45 y. o.female is here for a follow up.  She has the following chronic/acute medical problems:  Patient Active Problem List   Diagnosis    Fever    Fibromyalgia    Chronic pancreatitis (HCC)    HTN (hypertension)    Normocytic anemia    Frequent UTI    Gastroesophageal reflux disease without esophagitis    Chronic depression    Fatty liver disease, nonalcoholic    Arthritis    Bilateral low back pain with left-sided sciatica    Hiatal hernia    Chronic pain syndrome    Drug-seeking/Aberrant behavior    Receiving intravenous antibiotic treatment as outpatient    History of fibromyalgia    Lymph node disorder    Morbid obesity (HCC)    Iron deficiency anemia    History of Jet-en-Y gastric bypass    Anxiety    RUQ pain    Discoloration of skin of flank resembling ecchymosis    Chest pain of uncertain etiology    Cellulitis of left thigh    Malabsorption    COPD (chronic obstructive pulmonary disease) (HCC)    Nausea    Hypothyroidism due to acquired atrophy of thyroid    Vitamin D deficiency    Irritable bowel syndrome    Malabsorption due to intolerance, not elsewhere classified    Port-A-Cath in place    Psychogenic nonepileptic seizure    Diarrhea    Myalgia    Acute bilateral deep vein thrombosis (DVT) of femoral veins (HCC)    Acute deep vein thrombosis (DVT) of popliteal vein of left lower extremity (HCC)    Swelling of joint of left knee    Septic arthritis of knee, left (HCC)    Hemarthrosis of left knee    Ileus (HCC)    Acute pain of left knee    Hemarthrosis involving knee joint, left    Epigastric pain    S/P arthroscopic surgery of left knee    Abdominal pain    Lumbar radiculopathy    Radiculopathy of leg    Intractable nausea and vomiting    Hospital discharge follow-up    Rheumatoid arthritis involving multiple sites with positive rheumatoid factor ([address])

## 2023-08-15 DIAGNOSIS — M05.79 RHEUMATOID ARTHRITIS INVOLVING MULTIPLE SITES WITH POSITIVE RHEUMATOID FACTOR (HCC): ICD-10-CM

## 2023-08-16 DIAGNOSIS — M54.16 LUMBAR RADICULOPATHY: Primary | ICD-10-CM

## 2023-08-16 DIAGNOSIS — M05.79 RHEUMATOID ARTHRITIS INVOLVING MULTIPLE SITES WITH POSITIVE RHEUMATOID FACTOR (HCC): ICD-10-CM

## 2023-08-16 DIAGNOSIS — M54.10 RADICULOPATHY OF LEG: ICD-10-CM

## 2023-08-16 RX ORDER — HYDROXYCHLOROQUINE SULFATE 200 MG/1
TABLET, FILM COATED ORAL
Qty: 120 TABLET | Refills: 0 | OUTPATIENT
Start: 2023-08-16

## 2023-08-16 NOTE — TELEPHONE ENCOUNTER
LVM advising the pt that the refill request has been denied. Dr Cabrera Garcia is requesting a copy of the pts Plaquenil eye exam before she is willing to refill the medication. Pt was provided with our office fax number to have the results sent over.

## 2023-08-19 RX ORDER — HYDROXYCHLOROQUINE SULFATE 200 MG/1
200 TABLET, FILM COATED ORAL 2 TIMES DAILY
Qty: 120 TABLET | Refills: 0 | Status: SHIPPED | OUTPATIENT
Start: 2023-08-19

## 2023-08-20 PROBLEM — Z09 HOSPITAL DISCHARGE FOLLOW-UP: Status: RESOLVED | Noted: 2023-07-21 | Resolved: 2023-08-20

## 2023-08-23 ENCOUNTER — HOSPITAL ENCOUNTER (OUTPATIENT)
Dept: INFUSION THERAPY | Age: 53
Discharge: HOME | End: 2023-08-23
Payer: COMMERCIAL

## 2023-08-23 ENCOUNTER — OFFICE VISIT (OUTPATIENT)
Dept: ONCOLOGY | Age: 53
End: 2023-08-23
Payer: COMMERCIAL

## 2023-08-23 VITALS
TEMPERATURE: 98 F | HEIGHT: 64 IN | WEIGHT: 239.8 LBS | HEART RATE: 57 BPM | DIASTOLIC BLOOD PRESSURE: 66 MMHG | BODY MASS INDEX: 40.94 KG/M2 | SYSTOLIC BLOOD PRESSURE: 133 MMHG | OXYGEN SATURATION: 96 %

## 2023-08-23 DIAGNOSIS — N63.11 MASS OF UPPER OUTER QUADRANT OF RIGHT BREAST: ICD-10-CM

## 2023-08-23 DIAGNOSIS — D64.9 ANEMIA, UNSPECIFIED TYPE: ICD-10-CM

## 2023-08-23 DIAGNOSIS — N63.11 MASS OF UPPER OUTER QUADRANT OF RIGHT BREAST: Primary | ICD-10-CM

## 2023-08-23 LAB
BASOPHILS ABSOLUTE: 0 K/CU MM
BASOPHILS RELATIVE PERCENT: 0.6 % (ref 0–1)
DIFFERENTIAL TYPE: ABNORMAL
EOSINOPHILS ABSOLUTE: 0.3 K/CU MM
EOSINOPHILS RELATIVE PERCENT: 5.3 % (ref 0–3)
FERRITIN: 38 NG/ML (ref 15–150)
HCT VFR BLD CALC: 37.8 % (ref 37–47)
HEMOGLOBIN: 11.8 GM/DL (ref 12.5–16)
IRON: 76 UG/DL (ref 37–145)
LYMPHOCYTES ABSOLUTE: 2.5 K/CU MM
LYMPHOCYTES RELATIVE PERCENT: 48.6 % (ref 24–44)
MCH RBC QN AUTO: 27.8 PG (ref 27–31)
MCHC RBC AUTO-ENTMCNC: 31.2 % (ref 32–36)
MCV RBC AUTO: 88.9 FL (ref 78–100)
MONOCYTES ABSOLUTE: 0.3 K/CU MM
MONOCYTES RELATIVE PERCENT: 6.6 % (ref 0–4)
PCT TRANSFERRIN: 21 % (ref 10–44)
PDW BLD-RTO: 13.6 % (ref 11.7–14.9)
PLATELET # BLD: 96 K/CU MM (ref 140–440)
PMV BLD AUTO: 12.4 FL (ref 7.5–11.1)
RBC # BLD: 4.25 M/CU MM (ref 4.2–5.4)
SEGMENTED NEUTROPHILS ABSOLUTE COUNT: 2 K/CU MM
SEGMENTED NEUTROPHILS RELATIVE PERCENT: 38.9 % (ref 36–66)
TOTAL IRON BINDING CAPACITY: 368 UG/DL (ref 250–450)
UNSATURATED IRON BINDING CAPACITY: 292 UG/DL (ref 110–370)
WBC # BLD: 5.1 K/CU MM (ref 4–10.5)

## 2023-08-23 PROCEDURE — 83550 IRON BINDING TEST: CPT

## 2023-08-23 PROCEDURE — 36415 COLL VENOUS BLD VENIPUNCTURE: CPT

## 2023-08-23 PROCEDURE — 99213 OFFICE O/P EST LOW 20 MIN: CPT | Performed by: INTERNAL MEDICINE

## 2023-08-23 PROCEDURE — 1036F TOBACCO NON-USER: CPT | Performed by: INTERNAL MEDICINE

## 2023-08-23 PROCEDURE — 82728 ASSAY OF FERRITIN: CPT

## 2023-08-23 PROCEDURE — 3017F COLORECTAL CA SCREEN DOC REV: CPT | Performed by: INTERNAL MEDICINE

## 2023-08-23 PROCEDURE — 85025 COMPLETE CBC W/AUTO DIFF WBC: CPT

## 2023-08-23 PROCEDURE — 3075F SYST BP GE 130 - 139MM HG: CPT | Performed by: INTERNAL MEDICINE

## 2023-08-23 PROCEDURE — G8417 CALC BMI ABV UP PARAM F/U: HCPCS | Performed by: INTERNAL MEDICINE

## 2023-08-23 PROCEDURE — 83540 ASSAY OF IRON: CPT

## 2023-08-23 PROCEDURE — G8428 CUR MEDS NOT DOCUMENT: HCPCS | Performed by: INTERNAL MEDICINE

## 2023-08-23 PROCEDURE — 99211 OFF/OP EST MAY X REQ PHY/QHP: CPT

## 2023-08-23 PROCEDURE — 3078F DIAST BP <80 MM HG: CPT | Performed by: INTERNAL MEDICINE

## 2023-08-23 RX ORDER — LEVOTHYROXINE SODIUM 137 UG/1
137 TABLET ORAL DAILY
COMMUNITY
Start: 2023-08-20

## 2023-08-23 NOTE — PROGRESS NOTES
MA Rooming Questions  Patient: Caio Wallace  MRN: [id number]    Date: 8/23/2023        1. Do you have any new issues?   no         2. Do you need any refills on medications?    no    3. Have you had any imaging done since your last visit? yes - [facility] and us 7/28    4. Have you been hospitalized or seen in the emergency room since your last visit here?   no    5. Did the patient have a depression screening completed today?  No    No data recorded     PHQ-9 Given to (if applicable):               PHQ-9 Score (if applicable):                     [] Positive     []  Negative              Does question #9 need addressed (if applicable)                     [] Yes    []  No               Kasia Lux CMA
FU breast US in 3 months. 5. She has connective tissue disorder. + RF. She started humira on 7/25/2023. She has intermittent leukopenia, likely related to autoimmune disorder. 6, 9/2020 EGD by [facility]. Colonoscopy with biopsy was performed on 8/30/2020 with findings of poor prep exam and diverticulosis at [facility]. 4/2023 EGD by Dr Rosey Rivas. Reports Dr Rosey Rivas cancelled colonoscopy in April 2023. I recommend to FU with him. Return to clinic in 11/2023 or sooner. All of her question has been answered for today. Recent imaging and labs were reviewed and discussed with the patient.

## 2023-08-24 ENCOUNTER — TELEPHONE (OUTPATIENT)
Dept: ONCOLOGY | Age: 53
End: 2023-08-24

## 2023-08-24 NOTE — TELEPHONE ENCOUNTER
Attempted to call patient @ [phone] to inquire if patient is symptomatic after reviewing lab results with physician; however, no answer. Iron study on the lower end of normal range. May need to offer iron infusion (venofer 300 mg weekly x2). This RN direct number provided and requested call back to discuss.

## 2023-08-24 NOTE — TELEPHONE ENCOUNTER
Patient left a message stating she was advised to call today to see if she needed iron infusions due to her labs she had yesterday, please call

## 2023-08-25 DIAGNOSIS — D50.9 IRON DEFICIENCY ANEMIA, UNSPECIFIED IRON DEFICIENCY ANEMIA TYPE: Primary | ICD-10-CM

## 2023-08-25 DIAGNOSIS — K90.9 INTESTINAL MALABSORPTION, UNSPECIFIED TYPE: ICD-10-CM

## 2023-08-25 RX ORDER — SODIUM CHLORIDE 0.9 % (FLUSH) 0.9 %
5-40 SYRINGE (ML) INJECTION PRN
OUTPATIENT
Start: 2023-08-25

## 2023-08-25 RX ORDER — SODIUM CHLORIDE 9 MG/ML
INJECTION, SOLUTION INTRAVENOUS CONTINUOUS
OUTPATIENT
Start: 2023-08-25

## 2023-08-25 RX ORDER — DIPHENHYDRAMINE HYDROCHLORIDE 50 MG/ML
50 INJECTION INTRAMUSCULAR; INTRAVENOUS
OUTPATIENT
Start: 2023-08-25

## 2023-08-25 RX ORDER — HEPARIN SODIUM (PORCINE) LOCK FLUSH IV SOLN 100 UNIT/ML 100 UNIT/ML
500 SOLUTION INTRAVENOUS PRN
OUTPATIENT
Start: 2023-08-25

## 2023-08-25 RX ORDER — ALBUTEROL SULFATE 90 UG/1
4 AEROSOL, METERED RESPIRATORY (INHALATION) PRN
OUTPATIENT
Start: 2023-08-25

## 2023-08-25 RX ORDER — SODIUM CHLORIDE 9 MG/ML
5-250 INJECTION, SOLUTION INTRAVENOUS PRN
OUTPATIENT
Start: 2023-08-25

## 2023-08-25 RX ORDER — FAMOTIDINE 10 MG/ML
20 INJECTION, SOLUTION INTRAVENOUS
OUTPATIENT
Start: 2023-08-25

## 2023-08-25 RX ORDER — ONDANSETRON 2 MG/ML
8 INJECTION INTRAMUSCULAR; INTRAVENOUS
OUTPATIENT
Start: 2023-08-25

## 2023-08-25 RX ORDER — ACETAMINOPHEN 325 MG/1
650 TABLET ORAL
OUTPATIENT
Start: 2023-08-25

## 2023-08-25 RX ORDER — EPINEPHRINE 1 MG/ML
0.3 INJECTION, SOLUTION, CONCENTRATE INTRAVENOUS PRN
OUTPATIENT
Start: 2023-08-25

## 2023-08-25 NOTE — PROGRESS NOTES
Received VM from patient returning this RN's call (please see note from 08/24/2023). Patient is symptomatic and c/o generalized weakness, fatigue, chills and HA. Patient would like to proceed with getting iron infusion. Order for venofer 300 mg weekly x2 and NN referral placed per physician instruction. Attempted to call patient @ [phone] to update; however, no answer. VM left with this RN direct number should any additional needs present. Explained that once venofer approved and scheduled, patient will be contacted with infusion times.

## 2023-08-28 ENCOUNTER — HOSPITAL ENCOUNTER (EMERGENCY)
Age: 53
Discharge: HOME | End: 2023-08-28
Payer: COMMERCIAL

## 2023-08-28 ENCOUNTER — APPOINTMENT (OUTPATIENT)
Dept: GENERAL RADIOLOGY | Age: 53
End: 2023-08-28
Payer: COMMERCIAL

## 2023-08-28 ENCOUNTER — APPOINTMENT (OUTPATIENT)
Dept: CT IMAGING | Age: 53
End: 2023-08-28
Payer: COMMERCIAL

## 2023-08-28 VITALS
TEMPERATURE: 97.7 F | HEIGHT: 64 IN | OXYGEN SATURATION: 98 % | HEART RATE: 62 BPM | RESPIRATION RATE: 15 BRPM | WEIGHT: 230 LBS | BODY MASS INDEX: 39.27 KG/M2 | DIASTOLIC BLOOD PRESSURE: 56 MMHG | SYSTOLIC BLOOD PRESSURE: 114 MMHG

## 2023-08-28 DIAGNOSIS — R10.84 GENERALIZED ABDOMINAL PAIN: Primary | ICD-10-CM

## 2023-08-28 LAB
ALBUMIN SERPL-MCNC: 4.3 GM/DL (ref 3.4–5)
ALP BLD-CCNC: 116 IU/L (ref 40–129)
ALT SERPL-CCNC: 14 U/L (ref 10–40)
ANION GAP SERPL CALCULATED.3IONS-SCNC: 8 MMOL/L (ref 4–16)
AST SERPL-CCNC: 15 IU/L (ref 15–37)
BACTERIA: ABNORMAL /HPF
BASOPHILS ABSOLUTE: 0 K/CU MM
BASOPHILS RELATIVE PERCENT: 0.8 % (ref 0–1)
BILIRUB SERPL-MCNC: 0.2 MG/DL (ref 0–1)
BILIRUBIN URINE: NEGATIVE MG/DL
BLOOD, URINE: NEGATIVE
BUN SERPL-MCNC: 17 MG/DL (ref 6–23)
CALCIUM SERPL-MCNC: 10.2 MG/DL (ref 8.3–10.6)
CHLORIDE BLD-SCNC: 103 MMOL/L (ref 99–110)
CLARITY: CLEAR
CO2: 25 MMOL/L (ref 21–32)
COLOR: YELLOW
CREAT SERPL-MCNC: 0.6 MG/DL (ref 0.6–1.1)
DIFFERENTIAL TYPE: ABNORMAL
EOSINOPHILS ABSOLUTE: 0.2 K/CU MM
EOSINOPHILS RELATIVE PERCENT: 3.7 % (ref 0–3)
GFR SERPL CREATININE-BSD FRML MDRD: >60 ML/MIN/1.73M2
GLUCOSE SERPL-MCNC: 89 MG/DL (ref 70–99)
GLUCOSE, URINE: NEGATIVE MG/DL
HCT VFR BLD CALC: 32.3 % (ref 37–47)
HEMOGLOBIN: 10.4 GM/DL (ref 12.5–16)
IMMATURE NEUTROPHIL %: 0.2 % (ref 0–0.43)
KETONES, URINE: NEGATIVE MG/DL
LACTATE: 0.5 MMOL/L (ref 0.5–1.9)
LEUKOCYTE ESTERASE, URINE: ABNORMAL
LIPASE: 34 IU/L (ref 13–60)
LYMPHOCYTES ABSOLUTE: 2.3 K/CU MM
LYMPHOCYTES RELATIVE PERCENT: 46.5 % (ref 24–44)
MCH RBC QN AUTO: 27.7 PG (ref 27–31)
MCHC RBC AUTO-ENTMCNC: 32.2 % (ref 32–36)
MCV RBC AUTO: 85.9 FL (ref 78–100)
MONOCYTES ABSOLUTE: 0.4 K/CU MM
MONOCYTES RELATIVE PERCENT: 8.7 % (ref 0–4)
MUCUS: ABNORMAL HPF
NITRITE URINE, QUANTITATIVE: NEGATIVE
NUCLEATED RBC %: 0 %
PDW BLD-RTO: 12.8 % (ref 11.7–14.9)
PH, URINE: 5.5 (ref 5–8)
PLATELET # BLD: 191 K/CU MM (ref 140–440)
PMV BLD AUTO: 10.2 FL (ref 7.5–11.1)
POTASSIUM SERPL-SCNC: 4.3 MMOL/L (ref 3.5–5.1)
PRO-BNP: 154.2 PG/ML
PROTEIN UA: NEGATIVE MG/DL
RBC # BLD: 3.76 M/CU MM (ref 4.2–5.4)
RBC URINE: 0 /HPF (ref 0–6)
SEGMENTED NEUTROPHILS ABSOLUTE COUNT: 2 K/CU MM
SEGMENTED NEUTROPHILS RELATIVE PERCENT: 40.1 % (ref 36–66)
SODIUM BLD-SCNC: 136 MMOL/L (ref 135–145)
SPECIFIC GRAVITY UA: 1.01 (ref 1–1.03)
SQUAMOUS EPITHELIAL: 6 /HPF
TOTAL IMMATURE NEUTOROPHIL: 0.01 K/CU MM
TOTAL NUCLEATED RBC: 0 K/CU MM
TOTAL PROTEIN: 6.2 GM/DL (ref 6.4–8.2)
TRICHOMONAS: ABNORMAL /HPF
TROPONIN T: <0.01 NG/ML
UROBILINOGEN, URINE: 0.2 MG/DL (ref 0.2–1)
WBC # BLD: 4.9 K/CU MM (ref 4–10.5)
WBC UA: 1 /HPF (ref 0–5)

## 2023-08-28 PROCEDURE — 6360000002 HC RX W HCPCS: Performed by: PHYSICIAN ASSISTANT

## 2023-08-28 PROCEDURE — 71045 X-RAY EXAM CHEST 1 VIEW: CPT

## 2023-08-28 PROCEDURE — 87086 URINE CULTURE/COLONY COUNT: CPT

## 2023-08-28 PROCEDURE — 84484 ASSAY OF TROPONIN QUANT: CPT

## 2023-08-28 PROCEDURE — 85025 COMPLETE CBC W/AUTO DIFF WBC: CPT

## 2023-08-28 PROCEDURE — 96374 THER/PROPH/DIAG INJ IV PUSH: CPT

## 2023-08-28 PROCEDURE — 71260 CT THORAX DX C+: CPT

## 2023-08-28 PROCEDURE — 96372 THER/PROPH/DIAG INJ SC/IM: CPT

## 2023-08-28 PROCEDURE — 83880 ASSAY OF NATRIURETIC PEPTIDE: CPT

## 2023-08-28 PROCEDURE — 6360000002 HC RX W HCPCS: Performed by: EMERGENCY MEDICINE

## 2023-08-28 PROCEDURE — 6360000004 HC RX CONTRAST MEDICATION: Performed by: PHYSICIAN ASSISTANT

## 2023-08-28 PROCEDURE — 81001 URINALYSIS AUTO W/SCOPE: CPT

## 2023-08-28 PROCEDURE — 6370000000 HC RX 637 (ALT 250 FOR IP): Performed by: PHYSICIAN ASSISTANT

## 2023-08-28 PROCEDURE — 99285 EMERGENCY DEPT VISIT HI MDM: CPT

## 2023-08-28 PROCEDURE — 2580000003 HC RX 258: Performed by: PHYSICIAN ASSISTANT

## 2023-08-28 PROCEDURE — 83605 ASSAY OF LACTIC ACID: CPT

## 2023-08-28 PROCEDURE — 96375 TX/PRO/DX INJ NEW DRUG ADDON: CPT

## 2023-08-28 PROCEDURE — 93005 ELECTROCARDIOGRAM TRACING: CPT | Performed by: EMERGENCY MEDICINE

## 2023-08-28 PROCEDURE — 80053 COMPREHEN METABOLIC PANEL: CPT

## 2023-08-28 PROCEDURE — 83690 ASSAY OF LIPASE: CPT

## 2023-08-28 RX ORDER — DIPHENHYDRAMINE HYDROCHLORIDE 50 MG/ML
25 INJECTION INTRAMUSCULAR; INTRAVENOUS ONCE
Status: COMPLETED | OUTPATIENT
Start: 2023-08-28 | End: 2023-08-28

## 2023-08-28 RX ORDER — ONDANSETRON 2 MG/ML
4 INJECTION INTRAMUSCULAR; INTRAVENOUS ONCE
Status: COMPLETED | OUTPATIENT
Start: 2023-08-28 | End: 2023-08-28

## 2023-08-28 RX ORDER — HYDROCODONE BITARTRATE AND ACETAMINOPHEN 5; 325 MG/1; MG/1
2 TABLET ORAL ONCE
Status: COMPLETED | OUTPATIENT
Start: 2023-08-28 | End: 2023-08-28

## 2023-08-28 RX ORDER — DICYCLOMINE HYDROCHLORIDE 10 MG/1
10 CAPSULE ORAL 4 TIMES DAILY
Qty: 16 CAPSULE | Refills: 0 | Status: SHIPPED | OUTPATIENT
Start: 2023-08-28 | End: 2023-09-01

## 2023-08-28 RX ORDER — PROMETHAZINE HYDROCHLORIDE 25 MG/ML
25 INJECTION, SOLUTION INTRAMUSCULAR; INTRAVENOUS ONCE
Status: COMPLETED | OUTPATIENT
Start: 2023-08-28 | End: 2023-08-28

## 2023-08-28 RX ORDER — MORPHINE SULFATE 4 MG/ML
4 INJECTION, SOLUTION INTRAMUSCULAR; INTRAVENOUS ONCE
Status: COMPLETED | OUTPATIENT
Start: 2023-08-28 | End: 2023-08-28

## 2023-08-28 RX ORDER — PROMETHAZINE HYDROCHLORIDE 25 MG/1
12.5 TABLET ORAL EVERY 6 HOURS PRN
Qty: 4 TABLET | Refills: 0 | Status: SHIPPED | OUTPATIENT
Start: 2023-08-28 | End: 2023-08-30

## 2023-08-28 RX ORDER — HEPARIN 100 UNIT/ML
100 SYRINGE INTRAVENOUS ONCE
Status: COMPLETED | OUTPATIENT
Start: 2023-08-28 | End: 2023-08-28

## 2023-08-28 RX ORDER — 0.9 % SODIUM CHLORIDE 0.9 %
1000 INTRAVENOUS SOLUTION INTRAVENOUS ONCE
Status: COMPLETED | OUTPATIENT
Start: 2023-08-28 | End: 2023-08-28

## 2023-08-28 RX ORDER — DOCUSATE SODIUM 100 MG/1
100 CAPSULE, LIQUID FILLED ORAL 2 TIMES DAILY
Qty: 14 CAPSULE | Refills: 0 | Status: SHIPPED | OUTPATIENT
Start: 2023-08-28 | End: 2023-09-04

## 2023-08-28 RX ADMIN — ONDANSETRON 4 MG: 2 INJECTION INTRAMUSCULAR; INTRAVENOUS at 07:50

## 2023-08-28 RX ADMIN — DIPHENHYDRAMINE HYDROCHLORIDE 25 MG: 50 INJECTION, SOLUTION INTRAMUSCULAR; INTRAVENOUS at 09:07

## 2023-08-28 RX ADMIN — PROMETHAZINE HYDROCHLORIDE 25 MG: 25 INJECTION INTRAMUSCULAR; INTRAVENOUS at 09:51

## 2023-08-28 RX ADMIN — HYDROCODONE BITARTRATE AND ACETAMINOPHEN 2 TABLET: 5; 325 TABLET ORAL at 10:40

## 2023-08-28 RX ADMIN — IOPAMIDOL 75 ML: 755 INJECTION, SOLUTION INTRAVENOUS at 09:40

## 2023-08-28 RX ADMIN — HEPARIN 100 UNITS: 100 SYRINGE at 11:43

## 2023-08-28 RX ADMIN — MORPHINE SULFATE 4 MG: 4 INJECTION, SOLUTION INTRAMUSCULAR; INTRAVENOUS at 07:51

## 2023-08-28 RX ADMIN — SODIUM CHLORIDE 1000 ML: 9 INJECTION, SOLUTION INTRAVENOUS at 07:54

## 2023-08-28 ASSESSMENT — PAIN SCALES - GENERAL
PAINLEVEL_OUTOF10: 6
PAINLEVEL_OUTOF10: 6

## 2023-08-28 ASSESSMENT — PAIN - FUNCTIONAL ASSESSMENT: PAIN_FUNCTIONAL_ASSESSMENT: ACTIVITIES ARE NOT PREVENTED

## 2023-08-28 ASSESSMENT — PAIN DESCRIPTION - LOCATION: LOCATION: ABDOMEN

## 2023-08-28 ASSESSMENT — PAIN DESCRIPTION - ORIENTATION: ORIENTATION: MID;UPPER

## 2023-08-28 ASSESSMENT — PAIN DESCRIPTION - DESCRIPTORS: DESCRIPTORS: DULL

## 2023-08-28 NOTE — DISCHARGE INSTRUCTIONS
Contact your primary care provider, had your gastroenterologist to schedule appointment this week or next for reevaluation of your abdominal pain. Meanwhile he can continue to use your home pain medications. Would recommend utilization of a stool softener. Drink plenty of fluids. Return with any coughing up blood, fever over 100.3, passing out, worsening symptoms or any new concerns.

## 2023-08-28 NOTE — ED NOTES
Pt continues to complain of ABD pain. Landon KEEN aware.       Voncille Signs, RN  08/28/23 2282
To and from restroom without difficulty.       Katie Herrera RN  08/28/23 4452
To steven Clayton RN  08/28/23 1127
2

## 2023-08-28 NOTE — ED PROVIDER NOTES
12 lead EKG per my interpretation:  Normal Sinus Rhythm 68  Axis is   Normal  QTc is   404  There is no specific T wave changes appreciated. There is no specific ST wave changes appreciated.     Prior EKG to compare with was not available        Miles Pennington DO  08/28/23 4131
DIFFERENTIAL - Abnormal; Notable for the following components:       Result Value    RBC 3.76 (*)     Hemoglobin 10.4 (*)     Hematocrit 32.3 (*)     Lymphocytes % 46.5 (*)     Monocytes % 8.7 (*)     Eosinophils % 3.7 (*)     All other components within normal limits   COMPREHENSIVE METABOLIC PANEL - Abnormal; Notable for the following components: Total Protein 6.2 (*)     All other components within normal limits   URINALYSIS - Abnormal; Notable for the following components:    Leukocyte Esterase, Urine TRACE (*)     All other components within normal limits   MICROSCOPIC URINALYSIS - Abnormal; Notable for the following components:    Bacteria, UA OCCASIONAL (*)     Mucus, UA RARE (*)     All other components within normal limits   CULTURE, URINE    Narrative:     SETUP DATE/TIME:  08/28/2023 0942   LIPASE   TROPONIN   BRAIN NATRIURETIC PEPTIDE   LACTIC ACID       When ordered only abnormal lab results are displayed. All other labs were within normal range or not returned as of this dictation. EKG: When ordered, EKG's are interpreted by the Emergency Department Physician in the absence of a cardiologist.  Please see their note for interpretation of EKG. IMAGING:   Non-plain film images such as CT, Ultrasound and MRI are read by the radiologist. Plain radiographic images are visualized and preliminarily interpreted by the ED Provider with findings mentioned in ED Course and MDM    Interpretation per the Radiologist below, if available at the time of this note:    CT CHEST ABDOMEN PELVIS W CONTRAST Additional Contrast? None   Final Result   1. No acute process. 2. Left nonobstructive nephrolithiasis. XR CHEST PORTABLE   Final Result   No acute abnormality           No results found.          EMERGENCY DEPARTMENT COURSE / MDM:     Vitals:    08/28/23 1101 08/28/23 1106 08/28/23 1109 08/28/23 1142   BP:       Pulse: 65 66 67 62   Resp: 17 15 14 15   Temp:       TempSrc:       SpO2: 99% 95% 95% 98%

## 2023-08-29 LAB
CULTURE: NORMAL
EKG ATRIAL RATE: 68 BPM
EKG DIAGNOSIS: NORMAL
EKG P AXIS: 55 DEGREES
EKG P-R INTERVAL: 190 MS
EKG Q-T INTERVAL: 380 MS
EKG QRS DURATION: 86 MS
EKG QTC CALCULATION (BAZETT): 404 MS
EKG R AXIS: -15 DEGREES
EKG T AXIS: 37 DEGREES
EKG VENTRICULAR RATE: 68 BPM
Lab: NORMAL
SPECIMEN: NORMAL

## 2023-08-29 PROCEDURE — 93010 ELECTROCARDIOGRAM REPORT: CPT | Performed by: INTERNAL MEDICINE

## 2023-08-30 DIAGNOSIS — N95.1 MENOPAUSAL VASOMOTOR SYNDROME: ICD-10-CM

## 2023-08-30 RX ORDER — ESTRADIOL 0.5 MG/1
TABLET ORAL
Qty: 30 TABLET | Refills: 0 | Status: SHIPPED | OUTPATIENT
Start: 2023-08-30

## 2023-08-30 ASSESSMENT — ENCOUNTER SYMPTOMS: SHORTNESS OF BREATH: 0

## 2023-09-07 DIAGNOSIS — M05.79 RHEUMATOID ARTHRITIS INVOLVING MULTIPLE SITES WITH POSITIVE RHEUMATOID FACTOR (HCC): ICD-10-CM

## 2023-09-11 ENCOUNTER — TELEPHONE (OUTPATIENT)
Dept: RHEUMATOLOGY | Age: 53
End: 2023-09-11

## 2023-09-11 RX ORDER — ADALIMUMAB 40MG/0.4ML
KIT SUBCUTANEOUS
Refills: 1 | OUTPATIENT
Start: 2023-09-11

## 2023-09-11 NOTE — TELEPHONE ENCOUNTER
Pt called in stating that she may or may not be able to make it to her appointment today as she is in charge of taking her mother to and from Chemotherapy treatment. However she is in need of a refill on her Humira.

## 2023-09-12 DIAGNOSIS — F41.9 ANXIETY: ICD-10-CM

## 2023-09-12 RX ORDER — DICYCLOMINE HYDROCHLORIDE 10 MG/1
10 CAPSULE ORAL 4 TIMES DAILY
Qty: 16 CAPSULE | Refills: 0 | Status: SHIPPED | OUTPATIENT
Start: 2023-09-12 | End: 2023-09-16

## 2023-09-12 RX ORDER — HYDROXYZINE 50 MG/1
TABLET, FILM COATED ORAL
Qty: 30 TABLET | Refills: 0 | Status: SHIPPED | OUTPATIENT
Start: 2023-09-12

## 2023-09-14 ENCOUNTER — HOSPITAL ENCOUNTER (EMERGENCY)
Age: 53
Discharge: HOME | End: 2023-09-14
Attending: EMERGENCY MEDICINE
Payer: COMMERCIAL

## 2023-09-14 ENCOUNTER — HOSPITAL ENCOUNTER (OUTPATIENT)
Dept: SURGERY | Age: 53
Discharge: HOME | End: 2023-09-14
Payer: COMMERCIAL

## 2023-09-14 VITALS
SYSTOLIC BLOOD PRESSURE: 102 MMHG | DIASTOLIC BLOOD PRESSURE: 79 MMHG | OXYGEN SATURATION: 100 % | HEART RATE: 70 BPM | RESPIRATION RATE: 18 BRPM | HEIGHT: 64 IN | WEIGHT: 236 LBS | BODY MASS INDEX: 40.29 KG/M2 | TEMPERATURE: 97.8 F

## 2023-09-14 VITALS
HEART RATE: 67 BPM | TEMPERATURE: 97.7 F | HEIGHT: 64 IN | OXYGEN SATURATION: 100 % | RESPIRATION RATE: 16 BRPM | WEIGHT: 236 LBS | DIASTOLIC BLOOD PRESSURE: 66 MMHG | SYSTOLIC BLOOD PRESSURE: 135 MMHG | BODY MASS INDEX: 40.29 KG/M2

## 2023-09-14 DIAGNOSIS — K90.9 INTESTINAL MALABSORPTION, UNSPECIFIED TYPE: Primary | ICD-10-CM

## 2023-09-14 DIAGNOSIS — Z95.828 PORT-A-CATH IN PLACE: ICD-10-CM

## 2023-09-14 DIAGNOSIS — R11.2 NAUSEA AND VOMITING, UNSPECIFIED VOMITING TYPE: Primary | ICD-10-CM

## 2023-09-14 DIAGNOSIS — D50.0 IRON DEFICIENCY ANEMIA DUE TO CHRONIC BLOOD LOSS: ICD-10-CM

## 2023-09-14 DIAGNOSIS — R10.13 ABDOMINAL PAIN, EPIGASTRIC: ICD-10-CM

## 2023-09-14 LAB
ALBUMIN SERPL-MCNC: 3.8 GM/DL (ref 3.4–5)
ALP BLD-CCNC: 105 IU/L (ref 40–129)
ALT SERPL-CCNC: 13 U/L (ref 10–40)
ANION GAP SERPL CALCULATED.3IONS-SCNC: 8 MMOL/L (ref 4–16)
AST SERPL-CCNC: 15 IU/L (ref 15–37)
BASOPHILS ABSOLUTE: 0 K/CU MM
BASOPHILS RELATIVE PERCENT: 1 % (ref 0–1)
BILIRUB SERPL-MCNC: 0.2 MG/DL (ref 0–1)
BILIRUBIN URINE: NEGATIVE MG/DL
BLOOD, URINE: NEGATIVE
BUN SERPL-MCNC: 13 MG/DL (ref 6–23)
CALCIUM SERPL-MCNC: 9.6 MG/DL (ref 8.3–10.6)
CHLORIDE BLD-SCNC: 110 MMOL/L (ref 99–110)
CLARITY: CLEAR
CO2: 26 MMOL/L (ref 21–32)
COLOR: YELLOW
COMMENT UA: NORMAL
CREAT SERPL-MCNC: 0.6 MG/DL (ref 0.6–1.1)
DIFFERENTIAL TYPE: ABNORMAL
EOSINOPHILS ABSOLUTE: 0.2 K/CU MM
EOSINOPHILS RELATIVE PERCENT: 5.5 % (ref 0–3)
GFR SERPL CREATININE-BSD FRML MDRD: >60 ML/MIN/1.73M2
GLUCOSE SERPL-MCNC: 108 MG/DL (ref 70–99)
GLUCOSE, URINE: NEGATIVE MG/DL
HCT VFR BLD CALC: 31.9 % (ref 37–47)
HEMOGLOBIN: 10 GM/DL (ref 12.5–16)
IMMATURE NEUTROPHIL %: 0.3 % (ref 0–0.43)
KETONES, URINE: NEGATIVE MG/DL
LEUKOCYTE ESTERASE, URINE: NEGATIVE
LIPASE: 19 IU/L (ref 13–60)
LYMPHOCYTES ABSOLUTE: 2.1 K/CU MM
LYMPHOCYTES RELATIVE PERCENT: 53.2 % (ref 24–44)
MCH RBC QN AUTO: 27.3 PG (ref 27–31)
MCHC RBC AUTO-ENTMCNC: 31.3 % (ref 32–36)
MCV RBC AUTO: 87.2 FL (ref 78–100)
MONOCYTES ABSOLUTE: 0.2 K/CU MM
MONOCYTES RELATIVE PERCENT: 6.2 % (ref 0–4)
NITRITE URINE, QUANTITATIVE: NEGATIVE
PDW BLD-RTO: 13.3 % (ref 11.7–14.9)
PH, URINE: 7 (ref 5–8)
PLATELET # BLD: 184 K/CU MM (ref 140–440)
PMV BLD AUTO: 10.2 FL (ref 7.5–11.1)
POTASSIUM SERPL-SCNC: 4.4 MMOL/L (ref 3.5–5.1)
PROTEIN UA: NEGATIVE MG/DL
RBC # BLD: 3.66 M/CU MM (ref 4.2–5.4)
SEGMENTED NEUTROPHILS ABSOLUTE COUNT: 1.3 K/CU MM
SEGMENTED NEUTROPHILS RELATIVE PERCENT: 33.8 % (ref 36–66)
SODIUM BLD-SCNC: 144 MMOL/L (ref 135–145)
SPECIFIC GRAVITY UA: <1.005 (ref 1–1.03)
TOTAL IMMATURE NEUTOROPHIL: 0.01 K/CU MM
TOTAL PROTEIN: 5.9 GM/DL (ref 6.4–8.2)
UROBILINOGEN, URINE: 0.2 MG/DL (ref 0.2–1)
WBC # BLD: 3.9 K/CU MM (ref 4–10.5)

## 2023-09-14 PROCEDURE — 2580000003 HC RX 258: Performed by: EMERGENCY MEDICINE

## 2023-09-14 PROCEDURE — 96365 THER/PROPH/DIAG IV INF INIT: CPT

## 2023-09-14 PROCEDURE — 96374 THER/PROPH/DIAG INJ IV PUSH: CPT

## 2023-09-14 PROCEDURE — 80053 COMPREHEN METABOLIC PANEL: CPT

## 2023-09-14 PROCEDURE — 96366 THER/PROPH/DIAG IV INF ADDON: CPT

## 2023-09-14 PROCEDURE — 99284 EMERGENCY DEPT VISIT MOD MDM: CPT

## 2023-09-14 PROCEDURE — 6360000002 HC RX W HCPCS: Performed by: EMERGENCY MEDICINE

## 2023-09-14 PROCEDURE — 81003 URINALYSIS AUTO W/O SCOPE: CPT

## 2023-09-14 PROCEDURE — 2580000003 HC RX 258: Performed by: INTERNAL MEDICINE

## 2023-09-14 PROCEDURE — 6360000002 HC RX W HCPCS: Performed by: INTERNAL MEDICINE

## 2023-09-14 PROCEDURE — 96375 TX/PRO/DX INJ NEW DRUG ADDON: CPT

## 2023-09-14 PROCEDURE — 83690 ASSAY OF LIPASE: CPT

## 2023-09-14 PROCEDURE — 85025 COMPLETE CBC W/AUTO DIFF WBC: CPT

## 2023-09-14 PROCEDURE — 96372 THER/PROPH/DIAG INJ SC/IM: CPT

## 2023-09-14 RX ORDER — SODIUM CHLORIDE 0.9 % (FLUSH) 0.9 %
5-40 SYRINGE (ML) INJECTION PRN
OUTPATIENT
Start: 2023-09-14

## 2023-09-14 RX ORDER — HEPARIN 100 UNIT/ML
500 SYRINGE INTRAVENOUS PRN
Status: DISCONTINUED | OUTPATIENT
Start: 2023-09-14 | End: 2023-09-15 | Stop reason: HOSPADM

## 2023-09-14 RX ORDER — 0.9 % SODIUM CHLORIDE 0.9 %
1000 INTRAVENOUS SOLUTION INTRAVENOUS ONCE
Status: COMPLETED | OUTPATIENT
Start: 2023-09-14 | End: 2023-09-14

## 2023-09-14 RX ORDER — HEPARIN 100 UNIT/ML
500 SYRINGE INTRAVENOUS PRN
OUTPATIENT
Start: 2023-09-14

## 2023-09-14 RX ORDER — ONDANSETRON 2 MG/ML
4 INJECTION INTRAMUSCULAR; INTRAVENOUS EVERY 30 MIN PRN
Status: DISCONTINUED | OUTPATIENT
Start: 2023-09-14 | End: 2023-09-14 | Stop reason: HOSPADM

## 2023-09-14 RX ORDER — HEPARIN 100 UNIT/ML
500 SYRINGE INTRAVENOUS PRN
OUTPATIENT
Start: 2023-09-21

## 2023-09-14 RX ORDER — SODIUM CHLORIDE 9 MG/ML
5-250 INJECTION, SOLUTION INTRAVENOUS PRN
Status: DISCONTINUED | OUTPATIENT
Start: 2023-09-14 | End: 2023-09-15 | Stop reason: HOSPADM

## 2023-09-14 RX ORDER — FENTANYL CITRATE 50 UG/ML
50 INJECTION, SOLUTION INTRAMUSCULAR; INTRAVENOUS ONCE
Status: COMPLETED | OUTPATIENT
Start: 2023-09-14 | End: 2023-09-14

## 2023-09-14 RX ORDER — EPINEPHRINE 1 MG/ML
0.3 INJECTION, SOLUTION INTRAMUSCULAR; SUBCUTANEOUS PRN
OUTPATIENT
Start: 2023-09-21

## 2023-09-14 RX ORDER — SODIUM CHLORIDE 9 MG/ML
25 INJECTION, SOLUTION INTRAVENOUS PRN
OUTPATIENT
Start: 2023-09-14

## 2023-09-14 RX ORDER — ONDANSETRON 2 MG/ML
8 INJECTION INTRAMUSCULAR; INTRAVENOUS
OUTPATIENT
Start: 2023-09-21

## 2023-09-14 RX ORDER — ACETAMINOPHEN 325 MG/1
650 TABLET ORAL
OUTPATIENT
Start: 2023-09-21

## 2023-09-14 RX ORDER — ALBUTEROL SULFATE 90 UG/1
4 AEROSOL, METERED RESPIRATORY (INHALATION) PRN
OUTPATIENT
Start: 2023-09-21

## 2023-09-14 RX ORDER — SODIUM CHLORIDE 9 MG/ML
5-250 INJECTION, SOLUTION INTRAVENOUS PRN
OUTPATIENT
Start: 2023-09-21

## 2023-09-14 RX ORDER — SODIUM CHLORIDE 9 MG/ML
INJECTION, SOLUTION INTRAVENOUS CONTINUOUS
OUTPATIENT
Start: 2023-09-21

## 2023-09-14 RX ORDER — PROMETHAZINE HYDROCHLORIDE 25 MG/ML
25 INJECTION, SOLUTION INTRAMUSCULAR; INTRAVENOUS ONCE
Status: COMPLETED | OUTPATIENT
Start: 2023-09-14 | End: 2023-09-14

## 2023-09-14 RX ORDER — HEPARIN 100 UNIT/ML
500 SYRINGE INTRAVENOUS ONCE
Status: COMPLETED | OUTPATIENT
Start: 2023-09-14 | End: 2023-09-14

## 2023-09-14 RX ORDER — DIPHENHYDRAMINE HYDROCHLORIDE 50 MG/ML
50 INJECTION INTRAMUSCULAR; INTRAVENOUS
OUTPATIENT
Start: 2023-09-21

## 2023-09-14 RX ORDER — PROMETHAZINE HYDROCHLORIDE 25 MG/1
25 TABLET ORAL EVERY 6 HOURS PRN
Qty: 12 TABLET | Refills: 0 | Status: SHIPPED | OUTPATIENT
Start: 2023-09-14 | End: 2023-09-17

## 2023-09-14 RX ORDER — SODIUM CHLORIDE 0.9 % (FLUSH) 0.9 %
5-40 SYRINGE (ML) INJECTION PRN
OUTPATIENT
Start: 2023-09-21

## 2023-09-14 RX ORDER — SODIUM CHLORIDE 0.9 % (FLUSH) 0.9 %
5-40 SYRINGE (ML) INJECTION PRN
Status: DISCONTINUED | OUTPATIENT
Start: 2023-09-14 | End: 2023-09-15 | Stop reason: HOSPADM

## 2023-09-14 RX ADMIN — ONDANSETRON 4 MG: 2 INJECTION INTRAMUSCULAR; INTRAVENOUS at 17:16

## 2023-09-14 RX ADMIN — IRON SUCROSE 300 MG: 20 INJECTION, SOLUTION INTRAVENOUS at 12:52

## 2023-09-14 RX ADMIN — FENTANYL CITRATE 50 MCG: 50 INJECTION, SOLUTION INTRAMUSCULAR; INTRAVENOUS at 15:38

## 2023-09-14 RX ADMIN — SODIUM CHLORIDE 1000 ML: 9 INJECTION, SOLUTION INTRAVENOUS at 15:34

## 2023-09-14 RX ADMIN — Medication 500 UNITS: at 17:16

## 2023-09-14 RX ADMIN — PROMETHAZINE HYDROCHLORIDE 25 MG: 25 INJECTION INTRAMUSCULAR; INTRAVENOUS at 15:35

## 2023-09-14 ASSESSMENT — PAIN SCALES - GENERAL
PAINLEVEL_OUTOF10: 8
PAINLEVEL_OUTOF10: 8

## 2023-09-14 ASSESSMENT — PAIN - FUNCTIONAL ASSESSMENT
PAIN_FUNCTIONAL_ASSESSMENT: 0-10
PAIN_FUNCTIONAL_ASSESSMENT: 0-10
PAIN_FUNCTIONAL_ASSESSMENT: PREVENTS OR INTERFERES WITH MANY ACTIVE NOT PASSIVE ACTIVITIES

## 2023-09-14 ASSESSMENT — PAIN DESCRIPTION - LOCATION: LOCATION: ABDOMEN

## 2023-09-14 ASSESSMENT — PAIN DESCRIPTION - DESCRIPTORS: DESCRIPTORS: ACHING;SHARP

## 2023-09-14 NOTE — PROGRESS NOTES
Patient's infusion completed. Patient states she has had on/off abdominal pain for a week and hasn't been able to keep much food down and thinks she wants to go to the ER today. Patient asked about keeping mediport accessed in case ER wants to draw labs. JACKIE Franz supervisor aware and stated ok to leave mediport accessed.  transported patient to ER via wheelchair.

## 2023-09-14 NOTE — ED PROVIDER NOTES
Emergency Department Encounter    Patient: Kelli Mccullough  MRN: [id number]  : 1970  Date of Evaluation: 2023  ED Provider:  Carol Douglass MD    Triage Chief Complaint:   Abdominal Pain (R side x2 days ), Emesis (1x week ), and Headache    [place]:  Kelli Mccullough is a 45 y.o. female that presents with complaint of abdominal pain, nausea, vomiting, headache. Presents after getting her iron infusion. Reports right side pain over the last couple of days. Vomiting x1 week. Has chronic abdominal and back pain. Has chronic pancreatitis. Her worst symptom is the abdominal pain. No blood in her vomit. Has had cholecystectomy. Pain is also epigastric and wraps around the right side. Was upstairs getting her iron infusion and wanted to be seen in the ED. No fevers. Felt lightheaded. She has h/o anemia as well. The iron infusions usually help with her dizziness/lightheadedness and her headaches as well. No congestion or cough or CP or SOB. She takes percocet at home, has not been helping with the pain. Had a loose stool earlier. ROS - see HPI, below listed is current ROS at time of my eval:  10 systems reviewed and negative except as above. Past Medical History:   Diagnosis Date    Acid reflux     Anemia     Anxiety     Arthritis     Hands, Back And Ankles    Arthritis     Bleeding ulcer     \"I Had Ulcers In My Stomach And Colon\"/ per pt on 2019\"they said recently having some blood in my stomach- in July ( )could not find where coming from \"    Bronchitis Last Episode     Chronic back pain     Chronic pain     [facility]    COPD (chronic obstructive pulmonary disease) ([address] [facility])     Sees Dr. Pandey Alert    Depression     Disease of blood and blood forming organ     Fibromyalgia Dx     GERD (gastroesophageal reflux disease)     H/O echocardiogram 2015    EF >55%. LA to be at the upper limit of normal in size.  LV hypertrophy with normal LV systolic, but

## 2023-09-21 ENCOUNTER — TELEPHONE (OUTPATIENT)
Dept: RHEUMATOLOGY | Age: 53
End: 2023-09-21

## 2023-09-21 NOTE — TELEPHONE ENCOUNTER
Pt called in today stating that something has come up and she will be unable to come to her appt on 10/17/23 because she will be out of town. Pts appt has been added to the wait list currently due to our clinic availability. The pt states her pharmacy made her aware that she has no more Humira refills after next week. The pts biggest concern is going without medication. The pt is inquiring if it would be acceptable for her to complete her labs before leaving town on 10/5/23 so Dr. Meryle Peaches can review her levels and refill the medication. The pt also states that she has no idea when she will be returning back to town and she has already inquired with the pharmacy about her medication being sent to a pharmacy near where she will be staying. Is this something that we would be able to do for the pt?  Please advise

## 2023-09-25 ENCOUNTER — HOSPITAL ENCOUNTER (OUTPATIENT)
Dept: SURGERY | Age: 53
Discharge: HOME | End: 2023-09-25

## 2023-09-25 DIAGNOSIS — M05.79 RHEUMATOID ARTHRITIS INVOLVING MULTIPLE SITES WITH POSITIVE RHEUMATOID FACTOR (HCC): ICD-10-CM

## 2023-09-25 RX ORDER — ADALIMUMAB 40MG/0.4ML
40 KIT SUBCUTANEOUS
Qty: 2 EACH | Refills: 2 | Status: ACTIVE | OUTPATIENT
Start: 2023-09-25

## 2023-09-25 NOTE — PROGRESS NOTES
Pt. Was a no call/no show for her appointment today. This appointment has been rescheduled for her several times due to her not showing up. She received her first dose on 9/14/23 and has not had any since then.

## 2023-09-26 NOTE — TELEPHONE ENCOUNTER
I called pt to provide information from Dr. Meryle Peaches.  Pt expressed verbal understanding of information provided

## 2023-10-02 ENCOUNTER — OFFICE VISIT (OUTPATIENT)
Dept: ORTHOPEDIC SURGERY | Age: 53
End: 2023-10-02
Payer: COMMERCIAL

## 2023-10-02 VITALS — SYSTOLIC BLOOD PRESSURE: 126 MMHG | DIASTOLIC BLOOD PRESSURE: 80 MMHG | HEART RATE: 80 BPM | OXYGEN SATURATION: 98 %

## 2023-10-02 DIAGNOSIS — Z98.890 S/P ARTHROSCOPIC SURGERY OF LEFT KNEE: Primary | ICD-10-CM

## 2023-10-02 PROCEDURE — G8417 CALC BMI ABV UP PARAM F/U: HCPCS | Performed by: STUDENT IN AN ORGANIZED HEALTH CARE EDUCATION/TRAINING PROGRAM

## 2023-10-02 PROCEDURE — 3078F DIAST BP <80 MM HG: CPT | Performed by: STUDENT IN AN ORGANIZED HEALTH CARE EDUCATION/TRAINING PROGRAM

## 2023-10-02 PROCEDURE — 99213 OFFICE O/P EST LOW 20 MIN: CPT | Performed by: STUDENT IN AN ORGANIZED HEALTH CARE EDUCATION/TRAINING PROGRAM

## 2023-10-02 PROCEDURE — G8427 DOCREV CUR MEDS BY ELIG CLIN: HCPCS | Performed by: STUDENT IN AN ORGANIZED HEALTH CARE EDUCATION/TRAINING PROGRAM

## 2023-10-02 PROCEDURE — 3074F SYST BP LT 130 MM HG: CPT | Performed by: STUDENT IN AN ORGANIZED HEALTH CARE EDUCATION/TRAINING PROGRAM

## 2023-10-02 PROCEDURE — G8484 FLU IMMUNIZE NO ADMIN: HCPCS | Performed by: STUDENT IN AN ORGANIZED HEALTH CARE EDUCATION/TRAINING PROGRAM

## 2023-10-02 PROCEDURE — 3017F COLORECTAL CA SCREEN DOC REV: CPT | Performed by: STUDENT IN AN ORGANIZED HEALTH CARE EDUCATION/TRAINING PROGRAM

## 2023-10-02 PROCEDURE — 1036F TOBACCO NON-USER: CPT | Performed by: STUDENT IN AN ORGANIZED HEALTH CARE EDUCATION/TRAINING PROGRAM

## 2023-10-03 ASSESSMENT — ENCOUNTER SYMPTOMS
EYE REDNESS: 0
VOMITING: 0
STRIDOR: 0
NAUSEA: 0
BACK PAIN: 0
COLOR CHANGE: 0
PHOTOPHOBIA: 0
FACIAL SWELLING: 0
COUGH: 0
WHEEZING: 0
SHORTNESS OF BREATH: 0
EYE PAIN: 0
ABDOMINAL PAIN: 0

## 2023-10-03 NOTE — PROGRESS NOTES
Chronic left knee pain follow up. Referred to cathy on last visit, Angelito Stock told her that there is no surgery for her spinal condition, notes compression of vertebrae. Notes that her augmented walking to accommodate pain may be causing her spinal issues. Discussed knee replacement as suggested by Dr. Angelito Stock and discussed [facility] treatment. Remains willing to try [facility].
Normal.      Left wrist: Normal.      Right hand: Normal.      Left hand: No swelling, deformity, lacerations, tenderness or bony tenderness. Normal range of motion. Normal strength. Normal sensation. There is no disruption of two-point discrimination. Normal capillary refill. Normal pulse. Cervical back: Normal range of motion. Right hip: Normal.      Left hip: Normal.      Right upper leg: Normal.      Left upper leg: Normal.      Left knee: Bony tenderness and crepitus present. No swelling, deformity, effusion, erythema, ecchymosis or lacerations. Normal range of motion. Tenderness present over the medial joint line and lateral joint line. No LCL laxity, MCL laxity, ACL laxity or PCL laxity. Normal alignment, normal meniscus and normal patellar mobility. Normal pulse. Instability Tests: Anterior drawer test negative. Posterior drawer test negative. Anterior Lachman test negative. Right lower leg: Normal. No edema. Left lower leg: Normal. No tenderness or bony tenderness. No edema. Right ankle: Normal.      Left ankle: Normal.      Right foot: Normal.      Left foot: Normal.   Skin:     General: Skin is warm and dry. Capillary Refill: Capillary refill takes less than 2 seconds. Neurological:      General: No focal deficit present. Mental Status: She is alert and oriented to person, place, and time. Mental status is at baseline. Sensory: Sensory deficit present. Motor: Weakness present. Gait: Gait normal.   Psychiatric:         Mood and Affect: Mood normal.         Behavior: Behavior normal.          LEFT KNEE EXAMINATION       OBSERVATION / INSPECTION     Gait: Mildly antalgic     Alignment: Neutral     Scars: Well-healed previous arthroscopic incisions    Muscle atrophy: Minimal at quad    Effusion: None and without any warmth or erythema    Warmth: None     Discoloration: none       TENDERNESS / CREPITUS (T / C):       Patella - / -     Lateral joint line +

## 2023-10-05 DIAGNOSIS — N95.1 MENOPAUSAL VASOMOTOR SYNDROME: ICD-10-CM

## 2023-10-05 DIAGNOSIS — F41.9 ANXIETY: ICD-10-CM

## 2023-10-05 RX ORDER — SCOLOPAMINE TRANSDERMAL SYSTEM 1 MG/1
PATCH, EXTENDED RELEASE TRANSDERMAL
Qty: 10 PATCH | Refills: 0 | OUTPATIENT
Start: 2023-10-05

## 2023-10-05 RX ORDER — ESTRADIOL 0.5 MG/1
TABLET ORAL
Qty: 30 TABLET | Refills: 0 | OUTPATIENT
Start: 2023-10-05

## 2023-10-05 RX ORDER — HYDROXYZINE 50 MG/1
TABLET, FILM COATED ORAL
Qty: 30 TABLET | Refills: 0 | OUTPATIENT
Start: 2023-10-05

## 2023-10-06 ENCOUNTER — OFFICE VISIT (OUTPATIENT)
Dept: INTERNAL MEDICINE CLINIC | Age: 53
End: 2023-10-06

## 2023-10-06 VITALS
BODY MASS INDEX: 40.46 KG/M2 | WEIGHT: 237 LBS | OXYGEN SATURATION: 96 % | HEART RATE: 83 BPM | HEIGHT: 64 IN | SYSTOLIC BLOOD PRESSURE: 130 MMHG | DIASTOLIC BLOOD PRESSURE: 78 MMHG

## 2023-10-06 DIAGNOSIS — M17.12 OSTEOARTHRITIS OF LEFT KNEE, UNSPECIFIED OSTEOARTHRITIS TYPE: ICD-10-CM

## 2023-10-06 DIAGNOSIS — F41.9 ANXIETY: ICD-10-CM

## 2023-10-06 DIAGNOSIS — J44.9 CHRONIC OBSTRUCTIVE PULMONARY DISEASE, UNSPECIFIED COPD TYPE (HCC): ICD-10-CM

## 2023-10-06 DIAGNOSIS — K21.9 GASTROESOPHAGEAL REFLUX DISEASE WITHOUT ESOPHAGITIS: Primary | ICD-10-CM

## 2023-10-06 DIAGNOSIS — I10 PRIMARY HYPERTENSION: ICD-10-CM

## 2023-10-06 DIAGNOSIS — E66.01 MORBID OBESITY (HCC): ICD-10-CM

## 2023-10-06 DIAGNOSIS — N95.1 MENOPAUSAL VASOMOTOR SYNDROME: ICD-10-CM

## 2023-10-06 DIAGNOSIS — E03.4 HYPOTHYROIDISM DUE TO ACQUIRED ATROPHY OF THYROID: ICD-10-CM

## 2023-10-06 DIAGNOSIS — Z87.39 HISTORY OF FIBROMYALGIA: ICD-10-CM

## 2023-10-06 DIAGNOSIS — D50.0 IRON DEFICIENCY ANEMIA DUE TO CHRONIC BLOOD LOSS: ICD-10-CM

## 2023-10-06 DIAGNOSIS — Z98.84 HISTORY OF ROUX-EN-Y GASTRIC BYPASS: ICD-10-CM

## 2023-10-06 DIAGNOSIS — Z86.718 HISTORY OF DEEP VEIN THROMBOSIS (DVT) OF LOWER EXTREMITY: ICD-10-CM

## 2023-10-06 DIAGNOSIS — R11.0 CHRONIC NAUSEA: ICD-10-CM

## 2023-10-06 DIAGNOSIS — M05.79 RHEUMATOID ARTHRITIS INVOLVING MULTIPLE SITES WITH POSITIVE RHEUMATOID FACTOR (HCC): ICD-10-CM

## 2023-10-06 DIAGNOSIS — G89.4 CHRONIC PAIN SYNDROME: ICD-10-CM

## 2023-10-06 DIAGNOSIS — F32.A CHRONIC DEPRESSION: ICD-10-CM

## 2023-10-06 DIAGNOSIS — M54.42 BILATERAL LOW BACK PAIN WITH LEFT-SIDED SCIATICA, UNSPECIFIED CHRONICITY: ICD-10-CM

## 2023-10-06 DIAGNOSIS — F44.5 PSYCHOGENIC NONEPILEPTIC SEIZURE: ICD-10-CM

## 2023-10-06 PROBLEM — R10.13 EPIGASTRIC PAIN: Status: RESOLVED | Noted: 2022-11-29 | Resolved: 2023-10-06

## 2023-10-06 PROBLEM — M54.10 RADICULOPATHY OF LEG: Status: RESOLVED | Noted: 2023-06-19 | Resolved: 2023-10-06

## 2023-10-06 PROBLEM — R07.9 CHEST PAIN OF UNCERTAIN ETIOLOGY: Status: RESOLVED | Noted: 2021-02-09 | Resolved: 2023-10-06

## 2023-10-06 PROBLEM — Z79.2 RECEIVING INTRAVENOUS ANTIBIOTIC TREATMENT AS OUTPATIENT: Status: RESOLVED | Noted: 2019-07-10 | Resolved: 2023-10-06

## 2023-10-06 PROBLEM — M00.9 SEPTIC ARTHRITIS OF KNEE, LEFT (HCC): Status: RESOLVED | Noted: 2022-10-16 | Resolved: 2023-10-06

## 2023-10-06 PROBLEM — I82.413 ACUTE BILATERAL DEEP VEIN THROMBOSIS (DVT) OF FEMORAL VEINS (HCC): Status: RESOLVED | Noted: 2022-09-19 | Resolved: 2023-10-06

## 2023-10-06 PROBLEM — I82.432 ACUTE DEEP VEIN THROMBOSIS (DVT) OF POPLITEAL VEIN OF LEFT LOWER EXTREMITY (HCC): Status: RESOLVED | Noted: 2022-09-27 | Resolved: 2023-10-06

## 2023-10-06 PROBLEM — K90.49 MALABSORPTION DUE TO INTOLERANCE, NOT ELSEWHERE CLASSIFIED: Status: RESOLVED | Noted: 2021-06-28 | Resolved: 2023-10-06

## 2023-10-06 PROBLEM — M79.10 MYALGIA: Status: RESOLVED | Noted: 2022-09-21 | Resolved: 2023-10-06

## 2023-10-06 PROBLEM — L81.9 DISCOLORATION OF SKIN OF FLANK RESEMBLING ECCHYMOSIS: Status: RESOLVED | Noted: 2021-01-15 | Resolved: 2023-10-06

## 2023-10-06 PROBLEM — M25.062 HEMARTHROSIS INVOLVING KNEE JOINT, LEFT: Status: RESOLVED | Noted: 2022-11-29 | Resolved: 2023-10-06

## 2023-10-06 PROBLEM — M25.462 SWELLING OF JOINT OF LEFT KNEE: Status: RESOLVED | Noted: 2022-09-27 | Resolved: 2023-10-06

## 2023-10-06 PROBLEM — K56.7 ILEUS (HCC): Status: RESOLVED | Noted: 2022-11-26 | Resolved: 2023-10-06

## 2023-10-06 RX ORDER — HYDROXYZINE 50 MG/1
TABLET, FILM COATED ORAL
Qty: 30 TABLET | Refills: 3 | Status: SHIPPED | OUTPATIENT
Start: 2023-10-06

## 2023-10-06 RX ORDER — SCOLOPAMINE TRANSDERMAL SYSTEM 1 MG/1
PATCH, EXTENDED RELEASE TRANSDERMAL
Qty: 10 PATCH | Refills: 1 | Status: SHIPPED | OUTPATIENT
Start: 2023-10-06

## 2023-10-06 RX ORDER — ESTRADIOL 0.5 MG/1
0.5 TABLET ORAL DAILY
Qty: 30 TABLET | Refills: 3 | Status: SHIPPED | OUTPATIENT
Start: 2023-10-06

## 2023-10-06 NOTE — PROGRESS NOTES
age.  HEAD:                         Normocephalic, atraumatic   EYES:                          PERRLA, EOMI, lids normal, conjuctivea clear, sclera anicteric. NECK:                         Supple, symmetrical,  trachea midline, no thyromegaly, no JVD, no lymphadenopathy. LUNGS:                       Clear to auscultation bilaterally, respirations unlabored, accessory muscles are not used. HEART:                       Regular rate and rhythm, S1 and S2 normal, no murmur, rub or gallop. PMI in MCL. ABDOMEN:                 Soft, mild tenderness in epigastric area, bowel sounds are normoactive, no masses, no hepatospleenomegaly. Patient is morbidly obese  EXTREMITY:             Tenderness and crepitus of the left knee  NEURO:                      Alert, oriented to person, place and time. Grossly intact. Musculoskeletal:         No kyphosis or scoliosis, mild tenderness in her lower back and also in multiple joints, chronic. Skin:                            Warm and dry. No rash or obvious suspicious lesions. PSYCH:                       Mood euthymic, insight and judgement good. ASSESSMENT/PLAN:    1. Gastroesophageal reflux disease without esophagitis  Continue Protonix and continue to follow with her GI doctor as per his recommendations. 2. Chronic nausea  Continue scopolamine patches  - scopolamine (TRANSDERM-SCOP) transdermal patch; APPLY 1 PATCH TOPICALLY EVERY 72 HOURS AS NEEDED FOR NAUSEA FOR VOMITING  Dispense: 10 patch; Refill: 1    3. Morbid obesity ([address])  4. History of Jet-en-Y gastric bypass  Advised diet, exercise and weight loss    5. Primary hypertension  Not on any medication for blood pressure at this time. BP is stable without any medications. Advised low-salt diet and exercise and weight loss    6. Hypothyroidism due to acquired atrophy of thyroid  Continue Synthroid    7.  Chronic obstructive pulmonary disease,

## 2023-10-08 PROBLEM — M17.12 OSTEOARTHRITIS OF LEFT KNEE: Status: ACTIVE | Noted: 2023-10-08

## 2023-10-11 ENCOUNTER — TELEPHONE (OUTPATIENT)
Dept: ONCOLOGY | Age: 53
End: 2023-10-11

## 2023-10-11 ENCOUNTER — HOSPITAL ENCOUNTER (EMERGENCY)
Age: 53
Discharge: LEFT BEFORE DISCHARGE | End: 2023-10-11

## 2023-10-11 VITALS
WEIGHT: 232 LBS | BODY MASS INDEX: 39.61 KG/M2 | RESPIRATION RATE: 22 BRPM | DIASTOLIC BLOOD PRESSURE: 59 MMHG | HEART RATE: 57 BPM | OXYGEN SATURATION: 99 % | HEIGHT: 64 IN | TEMPERATURE: 98 F | SYSTOLIC BLOOD PRESSURE: 139 MMHG

## 2023-10-11 NOTE — TELEPHONE ENCOUNTER
10/11/23 - spoke w/ pt for the 10/25/23 Mammo at [facility] arrival time of 2:05 for a 2:20 appt - no deodorants, perfumes, powders to be worn. Pt will get labs done at [facility] as well.

## 2023-10-16 RX ORDER — DULOXETIN HYDROCHLORIDE 30 MG/1
CAPSULE, DELAYED RELEASE ORAL
Qty: 30 CAPSULE | Refills: 2 | Status: SHIPPED | OUTPATIENT
Start: 2023-10-16

## 2023-10-17 ENCOUNTER — OFFICE VISIT (OUTPATIENT)
Dept: FAMILY MEDICINE CLINIC | Age: 53
End: 2023-10-17
Payer: COMMERCIAL

## 2023-10-17 VITALS
WEIGHT: 240 LBS | HEART RATE: 77 BPM | OXYGEN SATURATION: 98 % | DIASTOLIC BLOOD PRESSURE: 70 MMHG | HEIGHT: 64 IN | TEMPERATURE: 97.3 F | SYSTOLIC BLOOD PRESSURE: 118 MMHG | BODY MASS INDEX: 40.97 KG/M2

## 2023-10-17 DIAGNOSIS — J01.90 ACUTE BACTERIAL SINUSITIS: Primary | ICD-10-CM

## 2023-10-17 DIAGNOSIS — B96.89 ACUTE BACTERIAL SINUSITIS: Primary | ICD-10-CM

## 2023-10-17 LAB
Lab: NORMAL
PERFORMING INSTRUMENT: NORMAL
QC PASS/FAIL: NORMAL
SARS-COV-2, POC: NORMAL

## 2023-10-17 PROCEDURE — 99213 OFFICE O/P EST LOW 20 MIN: CPT | Performed by: NURSE PRACTITIONER

## 2023-10-17 PROCEDURE — G8417 CALC BMI ABV UP PARAM F/U: HCPCS | Performed by: NURSE PRACTITIONER

## 2023-10-17 PROCEDURE — G8427 DOCREV CUR MEDS BY ELIG CLIN: HCPCS | Performed by: NURSE PRACTITIONER

## 2023-10-17 PROCEDURE — 87426 SARSCOV CORONAVIRUS AG IA: CPT | Performed by: NURSE PRACTITIONER

## 2023-10-17 PROCEDURE — 3017F COLORECTAL CA SCREEN DOC REV: CPT | Performed by: NURSE PRACTITIONER

## 2023-10-17 PROCEDURE — 3078F DIAST BP <80 MM HG: CPT | Performed by: NURSE PRACTITIONER

## 2023-10-17 PROCEDURE — G8484 FLU IMMUNIZE NO ADMIN: HCPCS | Performed by: NURSE PRACTITIONER

## 2023-10-17 PROCEDURE — 1036F TOBACCO NON-USER: CPT | Performed by: NURSE PRACTITIONER

## 2023-10-17 PROCEDURE — 3074F SYST BP LT 130 MM HG: CPT | Performed by: NURSE PRACTITIONER

## 2023-10-17 RX ORDER — FLUTICASONE PROPIONATE 50 MCG
1 SPRAY, SUSPENSION (ML) NASAL DAILY
Qty: 16 G | Refills: 0 | Status: SHIPPED | OUTPATIENT
Start: 2023-10-17

## 2023-10-17 RX ORDER — AMOXICILLIN AND CLAVULANATE POTASSIUM 875; 125 MG/1; MG/1
1 TABLET, FILM COATED ORAL 2 TIMES DAILY
Qty: 14 TABLET | Refills: 0 | Status: SHIPPED | OUTPATIENT
Start: 2023-10-17 | End: 2023-10-24

## 2023-10-17 ASSESSMENT — ENCOUNTER SYMPTOMS
CHEST TIGHTNESS: 0
RHINORRHEA: 1
SINUS PAIN: 1
NAUSEA: 1
SINUS PRESSURE: 1
VOMITING: 1
SORE THROAT: 1
ABDOMINAL PAIN: 0
COUGH: 1
DIARRHEA: 1
WHEEZING: 0
SWOLLEN GLANDS: 0
SHORTNESS OF BREATH: 0

## 2023-10-17 NOTE — PROGRESS NOTES
10/17/23  Andre Pope  1970    FLU/COVID-19 CLINIC EVALUATION    HPI SYMPTOMS:    Employer:    [x] Fevers  [x] Chills  [x] Cough  [] Coughing up blood  [x] Chest Congestion  [x] Nasal Congestion  [x] Feeling short of breath  [x] Sometimes  [] Frequently  [] All the time  [] Chest pain  [x] Headaches  []Tolerable  [x] Severe  [x] Sore throat  [x] Muscle aches  [x] Nausea  [x] Vomiting  []Unable to keep fluids down  [x] Diarrhea  []Severe    [] OTHER SYMPTOMS:      Symptom Duration:   [] 1  [] 2   [] 3   [] 4    [] 5   [] 6   [] 7   [] 8   [] 9   [x] 10   [] 11   [] 12   [] 13   [] 14   [] Longer than 14 days    Symptom course:   [x] Worsening     [] Stable     [] Improving    RISK FACTORS:    [] Pregnant or possibly pregnant  [] Age over 61  [] Diabetes  [] Heart disease  [] Asthma   *COPD/Other chronic lung diseases  [] Active Cancer  [] On Chemotherapy  [] Taking oral steroids  [] History Lymphoma/Leukemia  [] Close contact with a lab confirmed COVID-19 patient within 14 days of symptom onset  [] History of travel from affected geographical areas within 14 days of symptom onset       VITALS:  There were no vitals filed for this visit. TESTS:    POCT FLU:  [] Positive     []Negative    ASSESSMENT:    [] Flu  [] Possible COVID-19  [] Strep    PLAN:    [] Discharge home with written instructions for:  [] Flu management  [] Possible COVID-19 infection with self-quarantine and management of symptoms  [] Follow-up with primary care physician or emergency department if worsens  [] Evaluation per physician/NP/PA in clinic  [] Sent to ER       An  electronic signature was used to authenticate this note.      --Eduin Rodriguez MA on 10/17/2023 at 1:45 PM
canal and external ear normal.      Nose: Congestion present. Right Sinus: Maxillary sinus tenderness and frontal sinus tenderness present. Left Sinus: Maxillary sinus tenderness and frontal sinus tenderness present. Mouth/Throat:      Lips: Pink. Mouth: Mucous membranes are moist.      Pharynx: Posterior oropharyngeal erythema (slightly) present. Cardiovascular:      Rate and Rhythm: Normal rate and regular rhythm. Heart sounds: Normal heart sounds. Pulmonary:      Effort: Pulmonary effort is normal.      Breath sounds: Normal breath sounds. Musculoskeletal:      Cervical back: Neck supple. Skin:     General: Skin is warm and dry. Neurological:      Mental Status: She is alert and oriented to person, place, and time. Psychiatric:         Mood and Affect: Mood normal.         Behavior: Behavior normal.         Lab Results   Component Value Date    WBC 3.9 (L) 09/14/2023    HGB 10.0 (L) 09/14/2023    HCT 31.9 (L) 09/14/2023    MCV 87.2 09/14/2023     09/14/2023     Lab Results   Component Value Date     09/14/2023    K 4.4 09/14/2023     09/14/2023    CO2 26 09/14/2023    BUN 13 09/14/2023    CREATININE 0.6 09/14/2023    GLUCOSE 108 (H) 09/14/2023    CALCIUM 9.6 09/14/2023    PROT 5.9 (L) 09/14/2023    LABALBU 3.8 09/14/2023    BILITOT 0.2 09/14/2023    ALKPHOS 105 09/14/2023    AST 15 09/14/2023    ALT 13 09/14/2023    LABGLOM >60 09/14/2023    GFRAA >60 10/11/2022     Lab Results   Component Value Date    CHOL 146 02/10/2021     Lab Results   Component Value Date    TRIG 129 04/02/2023    TRIG 52 02/10/2021    TRIG 161 (H) 07/23/2015     Lab Results   Component Value Date    HDL 51 02/10/2021     No results found for: \"LDLCALC\", \"LDLCHOLESTEROL\"  Lab Results   Component Value Date    LABA1C 5.2 08/03/2021     Lab Results   Component Value Date    TSH 14.70 (H) 05/22/2023    TSHHS 15.100 (H) 07/26/2023         ASSESSMENT/PLAN:      1.  Acute bacterial

## 2023-10-19 DIAGNOSIS — F41.9 ANXIETY: ICD-10-CM

## 2023-10-19 DIAGNOSIS — F32.A CHRONIC DEPRESSION: ICD-10-CM

## 2023-10-19 RX ORDER — DULOXETIN HYDROCHLORIDE 60 MG/1
CAPSULE, DELAYED RELEASE ORAL
Qty: 30 CAPSULE | Refills: 0 | Status: SHIPPED | OUTPATIENT
Start: 2023-10-19

## 2023-10-30 ENCOUNTER — HOSPITAL ENCOUNTER (OUTPATIENT)
Dept: SURGERY | Age: 53
Discharge: HOME | End: 2023-10-30
Payer: COMMERCIAL

## 2023-10-30 VITALS
DIASTOLIC BLOOD PRESSURE: 56 MMHG | OXYGEN SATURATION: 98 % | TEMPERATURE: 97.8 F | SYSTOLIC BLOOD PRESSURE: 107 MMHG | HEART RATE: 61 BPM | RESPIRATION RATE: 16 BRPM

## 2023-10-30 DIAGNOSIS — K90.9 INTESTINAL MALABSORPTION, UNSPECIFIED TYPE: Primary | ICD-10-CM

## 2023-10-30 DIAGNOSIS — D50.0 IRON DEFICIENCY ANEMIA DUE TO CHRONIC BLOOD LOSS: ICD-10-CM

## 2023-10-30 PROCEDURE — 96365 THER/PROPH/DIAG IV INF INIT: CPT

## 2023-10-30 PROCEDURE — 2580000003 HC RX 258: Performed by: INTERNAL MEDICINE

## 2023-10-30 PROCEDURE — 6360000002 HC RX W HCPCS: Performed by: INTERNAL MEDICINE

## 2023-10-30 PROCEDURE — 99211 OFF/OP EST MAY X REQ PHY/QHP: CPT

## 2023-10-30 PROCEDURE — 96366 THER/PROPH/DIAG IV INF ADDON: CPT

## 2023-10-30 RX ORDER — ACETAMINOPHEN 325 MG/1
650 TABLET ORAL
OUTPATIENT
Start: 2023-10-30

## 2023-10-30 RX ORDER — SODIUM CHLORIDE 9 MG/ML
INJECTION, SOLUTION INTRAVENOUS CONTINUOUS
OUTPATIENT
Start: 2023-10-30

## 2023-10-30 RX ORDER — SODIUM CHLORIDE 9 MG/ML
5-250 INJECTION, SOLUTION INTRAVENOUS PRN
OUTPATIENT
Start: 2023-10-30

## 2023-10-30 RX ORDER — SODIUM CHLORIDE 0.9 % (FLUSH) 0.9 %
5-40 SYRINGE (ML) INJECTION PRN
OUTPATIENT
Start: 2023-10-30

## 2023-10-30 RX ORDER — SODIUM CHLORIDE 0.9 % (FLUSH) 0.9 %
5-40 SYRINGE (ML) INJECTION PRN
Status: DISCONTINUED | OUTPATIENT
Start: 2023-10-30 | End: 2023-10-31 | Stop reason: HOSPADM

## 2023-10-30 RX ORDER — HEPARIN 100 UNIT/ML
500 SYRINGE INTRAVENOUS PRN
OUTPATIENT
Start: 2023-10-30

## 2023-10-30 RX ORDER — DIPHENHYDRAMINE HYDROCHLORIDE 50 MG/ML
50 INJECTION INTRAMUSCULAR; INTRAVENOUS
OUTPATIENT
Start: 2023-10-30

## 2023-10-30 RX ORDER — ONDANSETRON 2 MG/ML
8 INJECTION INTRAMUSCULAR; INTRAVENOUS
OUTPATIENT
Start: 2023-10-30

## 2023-10-30 RX ORDER — HEPARIN 100 UNIT/ML
500 SYRINGE INTRAVENOUS PRN
Status: DISCONTINUED | OUTPATIENT
Start: 2023-10-30 | End: 2023-10-31 | Stop reason: HOSPADM

## 2023-10-30 RX ORDER — EPINEPHRINE 1 MG/ML
0.3 INJECTION, SOLUTION INTRAMUSCULAR; SUBCUTANEOUS PRN
OUTPATIENT
Start: 2023-10-30

## 2023-10-30 RX ORDER — SODIUM CHLORIDE 9 MG/ML
5-250 INJECTION, SOLUTION INTRAVENOUS PRN
Status: DISCONTINUED | OUTPATIENT
Start: 2023-10-30 | End: 2023-10-31 | Stop reason: HOSPADM

## 2023-10-30 RX ORDER — ALBUTEROL SULFATE 90 UG/1
4 AEROSOL, METERED RESPIRATORY (INHALATION) PRN
OUTPATIENT
Start: 2023-10-30

## 2023-10-30 RX ADMIN — HEPARIN 500 UNITS: 100 SYRINGE at 16:06

## 2023-10-30 RX ADMIN — IRON SUCROSE 300 MG: 20 INJECTION, SOLUTION INTRAVENOUS at 14:22

## 2023-10-30 RX ADMIN — SODIUM CHLORIDE, PRESERVATIVE FREE 10 ML: 5 INJECTION INTRAVENOUS at 16:05

## 2023-11-01 ENCOUNTER — HOSPITAL ENCOUNTER (EMERGENCY)
Age: 53
Discharge: HOME | End: 2023-11-01
Attending: EMERGENCY MEDICINE
Payer: COMMERCIAL

## 2023-11-01 VITALS
OXYGEN SATURATION: 98 % | HEART RATE: 57 BPM | DIASTOLIC BLOOD PRESSURE: 73 MMHG | HEIGHT: 64 IN | SYSTOLIC BLOOD PRESSURE: 131 MMHG | TEMPERATURE: 97.6 F | BODY MASS INDEX: 40.97 KG/M2 | WEIGHT: 240 LBS | RESPIRATION RATE: 14 BRPM

## 2023-11-01 DIAGNOSIS — G89.29 ACUTE EXACERBATION OF CHRONIC LOW BACK PAIN: ICD-10-CM

## 2023-11-01 DIAGNOSIS — R11.0 NAUSEA: ICD-10-CM

## 2023-11-01 DIAGNOSIS — R07.9 CHEST PAIN, UNSPECIFIED TYPE: ICD-10-CM

## 2023-11-01 DIAGNOSIS — R19.7 DIARRHEA, UNSPECIFIED TYPE: ICD-10-CM

## 2023-11-01 DIAGNOSIS — R10.13 ABDOMINAL PAIN, EPIGASTRIC: Primary | ICD-10-CM

## 2023-11-01 DIAGNOSIS — M54.50 ACUTE EXACERBATION OF CHRONIC LOW BACK PAIN: ICD-10-CM

## 2023-11-01 LAB
ALBUMIN SERPL-MCNC: 4.3 GM/DL (ref 3.4–5)
ALP BLD-CCNC: 134 IU/L (ref 40–129)
ALT SERPL-CCNC: 23 U/L (ref 10–40)
ANION GAP SERPL CALCULATED.3IONS-SCNC: 12 MMOL/L (ref 4–16)
AST SERPL-CCNC: 28 IU/L (ref 15–37)
BACTERIA: NEGATIVE /HPF
BASOPHILS ABSOLUTE: 0.1 K/CU MM
BASOPHILS RELATIVE PERCENT: 0.9 % (ref 0–1)
BILIRUB SERPL-MCNC: 0.3 MG/DL (ref 0–1)
BILIRUBIN URINE: NEGATIVE MG/DL
BLOOD, URINE: NEGATIVE
BUN SERPL-MCNC: 12 MG/DL (ref 6–23)
CALCIUM SERPL-MCNC: 10.4 MG/DL (ref 8.3–10.6)
CHLORIDE BLD-SCNC: 105 MMOL/L (ref 99–110)
CLARITY: CLEAR
CO2: 22 MMOL/L (ref 21–32)
COLOR: YELLOW
CREAT SERPL-MCNC: 0.6 MG/DL (ref 0.6–1.1)
DIFFERENTIAL TYPE: ABNORMAL
EKG ATRIAL RATE: 59 BPM
EKG DIAGNOSIS: NORMAL
EKG P AXIS: 50 DEGREES
EKG P-R INTERVAL: 188 MS
EKG Q-T INTERVAL: 414 MS
EKG QRS DURATION: 92 MS
EKG QTC CALCULATION (BAZETT): 409 MS
EKG R AXIS: -22 DEGREES
EKG T AXIS: 25 DEGREES
EKG VENTRICULAR RATE: 59 BPM
EOSINOPHILS ABSOLUTE: 0.4 K/CU MM
EOSINOPHILS RELATIVE PERCENT: 6.4 % (ref 0–3)
GFR SERPL CREATININE-BSD FRML MDRD: >60 ML/MIN/1.73M2
GLUCOSE SERPL-MCNC: 88 MG/DL (ref 70–99)
GLUCOSE, URINE: NEGATIVE MG/DL
HCT VFR BLD CALC: 34 % (ref 37–47)
HEMOGLOBIN: 10.8 GM/DL (ref 12.5–16)
HYALINE CASTS: 5 /LPF
IMMATURE NEUTROPHIL %: 0.5 % (ref 0–0.43)
INFLUENZA A ANTIGEN: NOT DETECTED
INFLUENZA B ANTIGEN: NOT DETECTED
KETONES, URINE: NEGATIVE MG/DL
LEUKOCYTE ESTERASE, URINE: ABNORMAL
LIPASE: 17 IU/L (ref 13–60)
LYMPHOCYTES ABSOLUTE: 3.1 K/CU MM
LYMPHOCYTES RELATIVE PERCENT: 56 % (ref 24–44)
MCH RBC QN AUTO: 27.7 PG (ref 27–31)
MCHC RBC AUTO-ENTMCNC: 31.8 % (ref 32–36)
MCV RBC AUTO: 87.2 FL (ref 78–100)
MONOCYTES ABSOLUTE: 0.3 K/CU MM
MONOCYTES RELATIVE PERCENT: 6.1 % (ref 0–4)
MUCUS: ABNORMAL HPF
NITRITE URINE, QUANTITATIVE: NEGATIVE
NUCLEATED RBC %: 0 %
PDW BLD-RTO: 12.7 % (ref 11.7–14.9)
PH, URINE: 6 (ref 5–8)
PLATELET # BLD: 206 K/CU MM (ref 140–440)
PMV BLD AUTO: 9.9 FL (ref 7.5–11.1)
POTASSIUM SERPL-SCNC: 4.6 MMOL/L (ref 3.5–5.1)
PROTEIN UA: NEGATIVE MG/DL
RBC # BLD: 3.9 M/CU MM (ref 4.2–5.4)
RBC URINE: 4 /HPF (ref 0–6)
SARS-COV-2 RDRP RESP QL NAA+PROBE: NOT DETECTED
SEGMENTED NEUTROPHILS ABSOLUTE COUNT: 1.7 K/CU MM
SEGMENTED NEUTROPHILS RELATIVE PERCENT: 30.1 % (ref 36–66)
SODIUM BLD-SCNC: 139 MMOL/L (ref 135–145)
SOURCE: NORMAL
SPECIFIC GRAVITY UA: 1.02 (ref 1–1.03)
SQUAMOUS EPITHELIAL: 5 /HPF
TOTAL IMMATURE NEUTOROPHIL: 0.03 K/CU MM
TOTAL NUCLEATED RBC: 0 K/CU MM
TOTAL PROTEIN: 6.7 GM/DL (ref 6.4–8.2)
TRANSITIONAL EPITHELIAL: <1 /HPF
TRICHOMONAS: ABNORMAL /HPF
TROPONIN, HIGH SENSITIVITY: 7 NG/L (ref 0–14)
TROPONIN, HIGH SENSITIVITY: <6 NG/L (ref 0–14)
UROBILINOGEN, URINE: 0.2 MG/DL (ref 0.2–1)
WBC # BLD: 5.6 K/CU MM (ref 4–10.5)
WBC UA: 20 /HPF (ref 0–5)

## 2023-11-01 PROCEDURE — 96374 THER/PROPH/DIAG INJ IV PUSH: CPT

## 2023-11-01 PROCEDURE — 85025 COMPLETE CBC W/AUTO DIFF WBC: CPT

## 2023-11-01 PROCEDURE — 99284 EMERGENCY DEPT VISIT MOD MDM: CPT

## 2023-11-01 PROCEDURE — 93005 ELECTROCARDIOGRAM TRACING: CPT | Performed by: EMERGENCY MEDICINE

## 2023-11-01 PROCEDURE — 6360000002 HC RX W HCPCS: Performed by: EMERGENCY MEDICINE

## 2023-11-01 PROCEDURE — 80053 COMPREHEN METABOLIC PANEL: CPT

## 2023-11-01 PROCEDURE — 93010 ELECTROCARDIOGRAM REPORT: CPT | Performed by: INTERNAL MEDICINE

## 2023-11-01 PROCEDURE — 2580000003 HC RX 258: Performed by: EMERGENCY MEDICINE

## 2023-11-01 PROCEDURE — 87502 INFLUENZA DNA AMP PROBE: CPT

## 2023-11-01 PROCEDURE — 87635 SARS-COV-2 COVID-19 AMP PRB: CPT

## 2023-11-01 PROCEDURE — 84484 ASSAY OF TROPONIN QUANT: CPT

## 2023-11-01 PROCEDURE — 96361 HYDRATE IV INFUSION ADD-ON: CPT

## 2023-11-01 PROCEDURE — 96375 TX/PRO/DX INJ NEW DRUG ADDON: CPT

## 2023-11-01 PROCEDURE — 83690 ASSAY OF LIPASE: CPT

## 2023-11-01 PROCEDURE — 81001 URINALYSIS AUTO W/SCOPE: CPT

## 2023-11-01 RX ORDER — 0.9 % SODIUM CHLORIDE 0.9 %
1000 INTRAVENOUS SOLUTION INTRAVENOUS ONCE
Status: COMPLETED | OUTPATIENT
Start: 2023-11-01 | End: 2023-11-01

## 2023-11-01 RX ORDER — ONDANSETRON 4 MG/1
4 TABLET, FILM COATED ORAL EVERY 8 HOURS PRN
Qty: 10 TABLET | Refills: 0 | Status: SHIPPED | OUTPATIENT
Start: 2023-11-01

## 2023-11-01 RX ORDER — HEPARIN 100 UNIT/ML
500 SYRINGE INTRAVENOUS ONCE
Status: COMPLETED | OUTPATIENT
Start: 2023-11-01 | End: 2023-11-01

## 2023-11-01 RX ORDER — FENTANYL CITRATE 50 UG/ML
50 INJECTION, SOLUTION INTRAMUSCULAR; INTRAVENOUS ONCE
Status: COMPLETED | OUTPATIENT
Start: 2023-11-01 | End: 2023-11-01

## 2023-11-01 RX ORDER — DROPERIDOL 2.5 MG/ML
1.25 INJECTION, SOLUTION INTRAMUSCULAR; INTRAVENOUS ONCE
Status: COMPLETED | OUTPATIENT
Start: 2023-11-01 | End: 2023-11-01

## 2023-11-01 RX ORDER — FAMOTIDINE 20 MG/1
20 TABLET, FILM COATED ORAL 2 TIMES DAILY
Qty: 60 TABLET | Refills: 0 | Status: SHIPPED | OUTPATIENT
Start: 2023-11-01

## 2023-11-01 RX ORDER — DIPHENHYDRAMINE HYDROCHLORIDE 50 MG/ML
50 INJECTION INTRAMUSCULAR; INTRAVENOUS ONCE
Status: COMPLETED | OUTPATIENT
Start: 2023-11-01 | End: 2023-11-01

## 2023-11-01 RX ADMIN — FENTANYL CITRATE 50 MCG: 50 INJECTION INTRAMUSCULAR; INTRAVENOUS at 09:00

## 2023-11-01 RX ADMIN — DIPHENHYDRAMINE HYDROCHLORIDE 50 MG: 50 INJECTION, SOLUTION INTRAMUSCULAR; INTRAVENOUS at 09:00

## 2023-11-01 RX ADMIN — DROPERIDOL 1.25 MG: 2.5 INJECTION, SOLUTION INTRAMUSCULAR; INTRAVENOUS at 09:00

## 2023-11-01 RX ADMIN — HEPARIN 500 UNITS: 100 SYRINGE at 11:22

## 2023-11-01 RX ADMIN — SODIUM CHLORIDE 1000 ML: 9 INJECTION, SOLUTION INTRAVENOUS at 08:57

## 2023-11-01 ASSESSMENT — ENCOUNTER SYMPTOMS
BACK PAIN: 0
ABDOMINAL PAIN: 1
STRIDOR: 0
WHEEZING: 0
SHORTNESS OF BREATH: 0
VOMITING: 1
COUGH: 0
CHEST TIGHTNESS: 1
DIARRHEA: 1
SORE THROAT: 0
NAUSEA: 1

## 2023-11-01 ASSESSMENT — PAIN DESCRIPTION - LOCATION: LOCATION: CHEST

## 2023-11-01 ASSESSMENT — PAIN SCALES - GENERAL: PAINLEVEL_OUTOF10: 7

## 2023-11-01 ASSESSMENT — PAIN DESCRIPTION - PAIN TYPE: TYPE: ACUTE PAIN

## 2023-11-01 ASSESSMENT — PAIN DESCRIPTION - DESCRIPTORS: DESCRIPTORS: TIGHTNESS

## 2023-11-01 ASSESSMENT — PAIN - FUNCTIONAL ASSESSMENT: PAIN_FUNCTIONAL_ASSESSMENT: 0-10

## 2023-11-01 NOTE — DISCHARGE INSTRUCTIONS
Please take medications as prescribed. You have been given referral to cardiology. Please contact the number listed on your discharge instructions today to schedule follow-up appointment. With your primary care provider in the next 3 days. Return to the emergency department for any worsening signs or symptoms.

## 2023-11-01 NOTE — ED PROVIDER NOTES
I independently examined and evaluated Andre Pope. I personally saw the patient and performed a substantive portion of the visit including all aspects of the medical decision making. In brief their history revealed patient is here with abdominal pain radiating into her back and chest.  Patient has associated nausea and vomiting. Patient reports \"I think I am having a flareup of my pancreatitis\". No fevers. No urinary symptoms. Their focused exam revealed the patient is afebrile and hemodynamically stable on room air. The patient appears age appropriate, appears well-hydrated, well-nourished. Appears nontoxic. Mucous membranes are moist. Speech is clear. Breathing is unlabored. Elevated BMI. There is mid abdominal tenderness to palpation. No generalized peritoneal signs. Skin is dry. Mental status is normal. The patient moves all extremities and is without facial droop. 12 lead EKG per my interpretation:  Sinus bradycardia at 59  Axis is   Left axis deviation  QTc is  within an acceptable range  There is no specific T wave changes appreciated. There is no specific ST wave changes appreciated. No STEMI    Prior EKG to compare with was available and no clinically significant change in over morphology compared to prior. ED course:   CC/HPI Summary, DDx, ED Course, and Reassessment:   Pt presents as above. Emergent conditions considered. Presentation prompted initial labs. Some delay in care as we had to access patient's port. Patient treated symptomatically. On recheck patient is with improved pain. Patient with recent CT imaging of abdomen/pelvis which was reassuring. Labs today are reassuring. Vitals are without clinically significant derangement. Patient is with chronic abdominal pain issues and I suspect a flareup of the same. Imaging is thus held after risk/benefit conversation with patient. Patient is appropriate for further outpatient follow-up.     History from :

## 2023-11-01 NOTE — ED TRIAGE NOTES
Patient complaining of chest tightness with nausea that started last night. Also complaining of back and abdominal pain. Diagnosed with kidney stone about 2 weeks ago. Denies fevers at home.

## 2023-11-01 NOTE — ED PROVIDER NOTES
[address] ENCOUNTER      Pt Name: Justin Gorman  MRN: [id number]  [facility] 1970  Date of evaluation: 11/1/2023  Provider: MICHAEL Vigil - CNP  PCP: Marcos Fuller MD  Note Started: 8:05 AM EDT 11/1/23    I am the Primary Clinician of Record. I have seen and evaluated this patient with my supervising physician Kyler Mercado MD.  CHIEF COMPLAINT       Chief Complaint   Patient presents with    Chest Pain     \"Chest tightness\"    Back Pain    Abdominal Pain       HISTORY OF PRESENT ILLNESS: 1 or more Elements   Justin Gorman is a 52 y.o. female who presents to the ER with chief complaint of abd pain, back pain, and chest pain. She  has associated nausea, vomiting, diarrhea. Onset of abd pain: 3  - 4 days. No dysuria, no hematuia, melana,     Onset of  left sided chest pain 6 am  radiating to left shoulder and left neck. Nausea and vomiting at that time as well. Low back pain x  2  weeks. Reports  diagnosed with kidney stone 2 weeks ago. I have reviewed the nursing triage documentation and agree unless otherwise noted. REVIEW OF SYSTEMS :    Review of Systems   Constitutional:  Negative for appetite change, fatigue and fever. HENT:  Negative for hearing loss and sore throat. Respiratory:  Positive for chest tightness. Negative for cough, shortness of breath, wheezing and stridor. Cardiovascular:  Positive for chest pain. Negative for palpitations and leg swelling. Gastrointestinal:  Positive for abdominal pain, diarrhea, nausea and vomiting. Genitourinary:  Negative for dysuria and hematuria. Musculoskeletal:  Negative for back pain. Skin:  Negative for pallor and rash. Neurological:  Positive for headaches. Negative for dizziness and syncope. Psychiatric/Behavioral:  Negative for confusion. Positives and Pertinent negatives as per HPI.    SURGICAL HISTORY     Past

## 2023-11-01 NOTE — ED NOTES
Attempted to access port, unsuccessful. Care hand off to Mehreen Kilgore. Care transferred at this time. Patient ambulated to restroom upon taking patient to her room.        Star Leventhal, RN  11/01/23 [id number]

## 2023-11-01 NOTE — ED NOTES
Discharge instructions reviewed with patient and family. Pt expressed understanding. Prescriptions x2 sent to pharm. Pt denies any further questions at this time. Pt&ox4, respiration unlabored. Pt ambulated from dept independently.         Letty Costello RN  11/01/23 1123

## 2023-11-07 DIAGNOSIS — F41.9 ANXIETY: ICD-10-CM

## 2023-11-07 DIAGNOSIS — F32.A CHRONIC DEPRESSION: ICD-10-CM

## 2023-11-07 RX ORDER — DULOXETIN HYDROCHLORIDE 30 MG/1
CAPSULE, DELAYED RELEASE ORAL
Qty: 30 CAPSULE | Refills: 2 | Status: SHIPPED | OUTPATIENT
Start: 2023-11-07

## 2023-11-07 RX ORDER — DULOXETIN HYDROCHLORIDE 60 MG/1
CAPSULE, DELAYED RELEASE ORAL
Qty: 30 CAPSULE | Refills: 0 | Status: SHIPPED | OUTPATIENT
Start: 2023-11-07

## 2023-11-08 DIAGNOSIS — M05.79 RHEUMATOID ARTHRITIS INVOLVING MULTIPLE SITES WITH POSITIVE RHEUMATOID FACTOR (HCC): ICD-10-CM

## 2023-11-08 NOTE — TELEPHONE ENCOUNTER
Pt called in stating that she is currently down to a few dosages of hydroxychloroquine (PLAQUENIL) 200 MG tablet and there are no refills at her pharmacy. Pt cancelled last appointment so I will reach out today to get her a follow up appointment scheduled.

## 2023-11-09 NOTE — TELEPHONE ENCOUNTER
I called the pt today to relay message from the provider. Pt has been scheduled for 1/17/24 for a follow up appt. The pt states she had a Plaquenil eye exam done within the last 4 months at [facility].  I will call [facility] to obtain documentation

## 2023-11-10 RX ORDER — HYDROXYCHLOROQUINE SULFATE 200 MG/1
200 TABLET, FILM COATED ORAL 2 TIMES DAILY
Qty: 120 TABLET | Refills: 0 | Status: SHIPPED | OUTPATIENT
Start: 2023-11-10 | End: 2023-11-10

## 2023-11-10 RX ORDER — HYDROXYCHLOROQUINE SULFATE 200 MG/1
200 TABLET, FILM COATED ORAL 2 TIMES DAILY
Qty: 120 TABLET | Refills: 0 | Status: SHIPPED | OUTPATIENT
Start: 2023-11-10

## 2023-11-10 NOTE — TELEPHONE ENCOUNTER
I spoke with [facility] and they stated they do not perform Plaquenil eye exams. I provided the pt with contact information to [address] [place] [address] Surgeons to have the Plaquenil eye exam. The pt states she will be out of the state for the rest of the month with her son. But she states she will schedule to have this completed when she is back in town.  I am unsure if this affects if you are willing to fill the medication or not

## 2023-11-18 DIAGNOSIS — E03.4 HYPOTHYROIDISM DUE TO ACQUIRED ATROPHY OF THYROID: ICD-10-CM

## 2023-11-20 RX ORDER — LEVOTHYROXINE SODIUM 0.15 MG/1
150 TABLET ORAL DAILY
Qty: 30 TABLET | Refills: 2 | Status: SHIPPED | OUTPATIENT
Start: 2023-11-20

## 2023-12-04 ENCOUNTER — HOSPITAL ENCOUNTER (OUTPATIENT)
Dept: ULTRASOUND IMAGING | Age: 53
Discharge: HOME | End: 2023-12-04
Payer: COMMERCIAL

## 2023-12-04 ENCOUNTER — TELEPHONE (OUTPATIENT)
Dept: INTERNAL MEDICINE CLINIC | Age: 53
End: 2023-12-04

## 2023-12-04 DIAGNOSIS — N63.11 MASS OF UPPER OUTER QUADRANT OF RIGHT BREAST: ICD-10-CM

## 2023-12-04 PROCEDURE — 76642 ULTRASOUND BREAST LIMITED: CPT

## 2023-12-04 NOTE — TELEPHONE ENCOUNTER
----- Message from Carolyne Heard sent at 12/4/2023  3:11 PM EST -----  Subject: Referral Request    Reason for referral request? Pain management  Provider patient wants to be referred to(if known):     Provider Phone Number(if known):[phone]    Additional Information for Provider? If the faxed number needed, it is   [phone].  Please advise.  ---------------------------------------------------------------------------  --------------  Allie Nash [facility]    [phone]; OK to leave message on voicemail  ---------------------------------------------------------------------------  --------------

## 2023-12-05 DIAGNOSIS — M17.12 OSTEOARTHRITIS OF LEFT KNEE, UNSPECIFIED OSTEOARTHRITIS TYPE: ICD-10-CM

## 2023-12-05 DIAGNOSIS — M05.79 RHEUMATOID ARTHRITIS INVOLVING MULTIPLE SITES WITH POSITIVE RHEUMATOID FACTOR (HCC): ICD-10-CM

## 2023-12-05 DIAGNOSIS — G89.4 CHRONIC PAIN SYNDROME: Primary | ICD-10-CM

## 2023-12-05 NOTE — TELEPHONE ENCOUNTER
Patient is moving to [place] at the end of January. She has pain management now here in [place], but they will not give her a referral to a new clinic. She was advised to get a referral now from us so she can get the process started and when she moves she can already has a new patient appointment. She needs the referral to go to:    Viridiana Askew MD [facility], [facility] [address]  PHONE: [phone]  FAX: [phone]    She is still keeping her appointment here on 1/2/24. However that could be her last appointment since she is moving at the end of January. She will get her refills then so she has time to get established at a place in [place].

## 2023-12-26 DIAGNOSIS — F41.9 ANXIETY: ICD-10-CM

## 2023-12-26 DIAGNOSIS — F32.A CHRONIC DEPRESSION: ICD-10-CM

## 2023-12-27 DIAGNOSIS — M05.79 RHEUMATOID ARTHRITIS INVOLVING MULTIPLE SITES WITH POSITIVE RHEUMATOID FACTOR (HCC): ICD-10-CM

## 2023-12-27 RX ORDER — DULOXETIN HYDROCHLORIDE 60 MG/1
CAPSULE, DELAYED RELEASE ORAL
Qty: 30 CAPSULE | Refills: 0 | Status: SHIPPED | OUTPATIENT
Start: 2023-12-27

## 2024-01-02 ENCOUNTER — OFFICE VISIT (OUTPATIENT)
Dept: INTERNAL MEDICINE CLINIC | Age: 54
End: 2024-01-02
Payer: COMMERCIAL

## 2024-01-02 VITALS
DIASTOLIC BLOOD PRESSURE: 80 MMHG | HEIGHT: 64 IN | HEART RATE: 66 BPM | SYSTOLIC BLOOD PRESSURE: 115 MMHG | WEIGHT: 234 LBS | BODY MASS INDEX: 39.95 KG/M2

## 2024-01-02 DIAGNOSIS — M05.79 RHEUMATOID ARTHRITIS INVOLVING MULTIPLE SITES WITH POSITIVE RHEUMATOID FACTOR (HCC): ICD-10-CM

## 2024-01-02 DIAGNOSIS — J44.9 CHRONIC OBSTRUCTIVE PULMONARY DISEASE, UNSPECIFIED COPD TYPE (HCC): ICD-10-CM

## 2024-01-02 DIAGNOSIS — K21.9 GASTROESOPHAGEAL REFLUX DISEASE WITHOUT ESOPHAGITIS: Primary | ICD-10-CM

## 2024-01-02 DIAGNOSIS — G89.4 CHRONIC PAIN SYNDROME: ICD-10-CM

## 2024-01-02 DIAGNOSIS — R11.0 CHRONIC NAUSEA: ICD-10-CM

## 2024-01-02 DIAGNOSIS — M79.7 FIBROMYALGIA: Chronic | ICD-10-CM

## 2024-01-02 DIAGNOSIS — D50.0 IRON DEFICIENCY ANEMIA DUE TO CHRONIC BLOOD LOSS: ICD-10-CM

## 2024-01-02 DIAGNOSIS — M54.42 BILATERAL LOW BACK PAIN WITH LEFT-SIDED SCIATICA, UNSPECIFIED CHRONICITY: ICD-10-CM

## 2024-01-02 DIAGNOSIS — F44.5 PSYCHOGENIC NONEPILEPTIC SEIZURE: ICD-10-CM

## 2024-01-02 DIAGNOSIS — K58.9 IRRITABLE BOWEL SYNDROME, UNSPECIFIED TYPE: ICD-10-CM

## 2024-01-02 DIAGNOSIS — Z98.84 HISTORY OF ROUX-EN-Y GASTRIC BYPASS: ICD-10-CM

## 2024-01-02 DIAGNOSIS — F32.A CHRONIC DEPRESSION: ICD-10-CM

## 2024-01-02 DIAGNOSIS — I10 PRIMARY HYPERTENSION: ICD-10-CM

## 2024-01-02 DIAGNOSIS — E66.01 MORBID OBESITY (HCC): ICD-10-CM

## 2024-01-02 DIAGNOSIS — F41.9 ANXIETY: ICD-10-CM

## 2024-01-02 DIAGNOSIS — E03.4 HYPOTHYROIDISM DUE TO ACQUIRED ATROPHY OF THYROID: ICD-10-CM

## 2024-01-02 PROCEDURE — 3023F SPIROM DOC REV: CPT | Performed by: INTERNAL MEDICINE

## 2024-01-02 PROCEDURE — 1036F TOBACCO NON-USER: CPT | Performed by: INTERNAL MEDICINE

## 2024-01-02 PROCEDURE — G8482 FLU IMMUNIZE ORDER/ADMIN: HCPCS | Performed by: INTERNAL MEDICINE

## 2024-01-02 PROCEDURE — 99214 OFFICE O/P EST MOD 30 MIN: CPT | Performed by: INTERNAL MEDICINE

## 2024-01-02 PROCEDURE — G8427 DOCREV CUR MEDS BY ELIG CLIN: HCPCS | Performed by: INTERNAL MEDICINE

## 2024-01-02 PROCEDURE — 3017F COLORECTAL CA SCREEN DOC REV: CPT | Performed by: INTERNAL MEDICINE

## 2024-01-02 PROCEDURE — 3074F SYST BP LT 130 MM HG: CPT | Performed by: INTERNAL MEDICINE

## 2024-01-02 PROCEDURE — 3079F DIAST BP 80-89 MM HG: CPT | Performed by: INTERNAL MEDICINE

## 2024-01-02 PROCEDURE — G8417 CALC BMI ABV UP PARAM F/U: HCPCS | Performed by: INTERNAL MEDICINE

## 2024-01-02 RX ORDER — DICYCLOMINE HYDROCHLORIDE 10 MG/1
10 CAPSULE ORAL 4 TIMES DAILY
Qty: 120 CAPSULE | Refills: 0 | Status: SHIPPED | OUTPATIENT
Start: 2024-01-02 | End: 2024-02-01

## 2024-01-02 RX ORDER — AMOXICILLIN 500 MG/1
500 CAPSULE ORAL 3 TIMES DAILY
COMMUNITY

## 2024-01-02 ASSESSMENT — PATIENT HEALTH QUESTIONNAIRE - PHQ9
SUM OF ALL RESPONSES TO PHQ QUESTIONS 1-9: 0
5. POOR APPETITE OR OVEREATING: 0
SUM OF ALL RESPONSES TO PHQ9 QUESTIONS 1 & 2: 0
9. THOUGHTS THAT YOU WOULD BE BETTER OFF DEAD, OR OF HURTING YOURSELF: 0
10. IF YOU CHECKED OFF ANY PROBLEMS, HOW DIFFICULT HAVE THESE PROBLEMS MADE IT FOR YOU TO DO YOUR WORK, TAKE CARE OF THINGS AT HOME, OR GET ALONG WITH OTHER PEOPLE: 0
SUM OF ALL RESPONSES TO PHQ QUESTIONS 1-9: 0
8. MOVING OR SPEAKING SO SLOWLY THAT OTHER PEOPLE COULD HAVE NOTICED. OR THE OPPOSITE, BEING SO FIGETY OR RESTLESS THAT YOU HAVE BEEN MOVING AROUND A LOT MORE THAN USUAL: 0
4. FEELING TIRED OR HAVING LITTLE ENERGY: 0
7. TROUBLE CONCENTRATING ON THINGS, SUCH AS READING THE NEWSPAPER OR WATCHING TELEVISION: 0
SUM OF ALL RESPONSES TO PHQ QUESTIONS 1-9: 0
1. LITTLE INTEREST OR PLEASURE IN DOING THINGS: 0
6. FEELING BAD ABOUT YOURSELF - OR THAT YOU ARE A FAILURE OR HAVE LET YOURSELF OR YOUR FAMILY DOWN: 0
SUM OF ALL RESPONSES TO PHQ QUESTIONS 1-9: 0
2. FEELING DOWN, DEPRESSED OR HOPELESS: 0
3. TROUBLE FALLING OR STAYING ASLEEP: 0

## 2024-01-02 NOTE — TELEPHONE ENCOUNTER
[facility] and [facility] faxed a medication refill on the behalf of the pt for Humira

## 2024-01-02 NOTE — PROGRESS NOTES
Name: Andre Pope  [id number]  Age: 53 y.o.  YOB: 1970  Sex: female    CHIEF COMPLAINT:    Chief Complaint   Patient presents with    Hypertension    Gastroesophageal Reflux    Other     Other chronic conditions         HISTORY OF PRESENT ILLNESS:     This is a pleasant  53 y.o. female  is seen today for management of chronic medical problems and medications refills.  Previous records reviewed .       This patient has multiple medical problems including hypertension, hypothyroid, rheumatoid arthritis, gastroesophageal reflux disease and erosive gastritis, chronic nausea and vomiting, chronic dizziness, chronic pain syndrome, chronic depression, COPD, morbid obesity status post Jet-en-Y gastric bypass, epileptic seizures versus psychogenic nonepileptic seizures, chronic left knee pain and pain in her lower back and left leg etc..       C/O left foot infection.. did call Dr. Doran her podiatrist today and he prescribed her an ATB and  he is going to see her this Thursday for further care.    Still C/O chronic nausea , takes phenergan form her GI doctor periodically.  She has chronic nausea since her bariatric surgery. She also has a history of erosive gastritis.     Continues to have significant pain in her left knee and left hip and chronic back pain.  She is seeing Dr. Sr.  Also she is seeing Dr. Wu for chronic back pain  and lumbar radiculopathy.  She is also seeing Dr. Remy, rheumatologist for her rheumatoid arthritis involving multiple joints.  She is on Plaquenil and Humira.  She is also seeing pain management doctor and taking Percocet from them.  Dr. Sr recommended left total knee replacement to be done at [facility] as she has multiple medical problems.  Patient wants to wait for left TKR     Dr. Yanes is seeing her periodically for her anemia.  Her anemia is because of multiple causes.  She has gastric sleeve surgery in 2019.  She has a history of upper GI bleed related to

## 2024-01-03 ENCOUNTER — TELEPHONE (OUTPATIENT)
Dept: INTERNAL MEDICINE CLINIC | Age: 54
End: 2024-01-03

## 2024-01-03 DIAGNOSIS — G40.909 SEIZURE DISORDER (HCC): ICD-10-CM

## 2024-01-03 RX ORDER — LEVETIRACETAM 1000 MG/1
TABLET ORAL
Qty: 60 TABLET | Refills: 3 | Status: SHIPPED | OUTPATIENT
Start: 2024-01-03

## 2024-01-03 NOTE — TELEPHONE ENCOUNTER
Patient called office, needs refill for keppra sent to [facility] on [address].

## 2024-01-04 RX ORDER — ADALIMUMAB 40MG/0.4ML
KIT SUBCUTANEOUS
Refills: 2 | OUTPATIENT
Start: 2024-01-04

## 2024-01-05 DIAGNOSIS — M05.79 RHEUMATOID ARTHRITIS INVOLVING MULTIPLE SITES WITH POSITIVE RHEUMATOID FACTOR (HCC): ICD-10-CM

## 2024-01-05 RX ORDER — ADALIMUMAB 40MG/0.4ML
40 KIT SUBCUTANEOUS
Qty: 2 EACH | Refills: 0 | Status: SHIPPED | OUTPATIENT
Start: 2024-01-05

## 2024-01-13 ENCOUNTER — APPOINTMENT (OUTPATIENT)
Dept: CT IMAGING | Age: 54
DRG: 392 | End: 2024-01-13
Payer: COMMERCIAL

## 2024-01-13 ENCOUNTER — HOSPITAL ENCOUNTER (INPATIENT)
Age: 54
LOS: 2 days | Discharge: HOME | DRG: 392 | End: 2024-01-17
Attending: STUDENT IN AN ORGANIZED HEALTH CARE EDUCATION/TRAINING PROGRAM | Admitting: HOSPITALIST
Payer: COMMERCIAL

## 2024-01-13 DIAGNOSIS — R10.9 INTRACTABLE ABDOMINAL PAIN: Primary | ICD-10-CM

## 2024-01-13 DIAGNOSIS — R11.2 NAUSEA AND VOMITING, UNSPECIFIED VOMITING TYPE: ICD-10-CM

## 2024-01-13 LAB
ALBUMIN SERPL-MCNC: 4.3 GM/DL (ref 3.4–5)
ALP BLD-CCNC: 128 IU/L (ref 40–129)
ALT SERPL-CCNC: 20 U/L (ref 10–40)
ANION GAP SERPL CALCULATED.3IONS-SCNC: 11 MMOL/L (ref 7–16)
AST SERPL-CCNC: 21 IU/L (ref 15–37)
BACTERIA: NEGATIVE /HPF
BASOPHILS ABSOLUTE: 0 K/CU MM
BASOPHILS RELATIVE PERCENT: 1 % (ref 0–1)
BILIRUB SERPL-MCNC: 0.3 MG/DL (ref 0–1)
BILIRUBIN URINE: NEGATIVE MG/DL
BLOOD, URINE: NEGATIVE
BUN SERPL-MCNC: 14 MG/DL (ref 6–23)
CALCIUM SERPL-MCNC: 10 MG/DL (ref 8.3–10.6)
CHLORIDE BLD-SCNC: 104 MMOL/L (ref 99–110)
CLARITY: CLEAR
CO2: 25 MMOL/L (ref 21–32)
COLOR: YELLOW
CREAT SERPL-MCNC: 0.5 MG/DL (ref 0.6–1.1)
DIFFERENTIAL TYPE: ABNORMAL
EOSINOPHILS ABSOLUTE: 0.2 K/CU MM
EOSINOPHILS RELATIVE PERCENT: 5.7 % (ref 0–3)
GFR SERPL CREATININE-BSD FRML MDRD: >60 ML/MIN/1.73M2
GLUCOSE SERPL-MCNC: 94 MG/DL (ref 70–99)
GLUCOSE, URINE: NEGATIVE MG/DL
HCT VFR BLD CALC: 36.1 % (ref 37–47)
HEMOGLOBIN: 11.9 GM/DL (ref 12.5–16)
IMMATURE NEUTROPHIL %: 0 % (ref 0–0.43)
KETONES, URINE: NEGATIVE MG/DL
LACTATE: 1 MMOL/L (ref 0.5–1.9)
LEUKOCYTE ESTERASE, URINE: ABNORMAL
LIPASE: 15 IU/L (ref 13–60)
LYMPHOCYTES ABSOLUTE: 2.4 K/CU MM
LYMPHOCYTES RELATIVE PERCENT: 57.4 % (ref 24–44)
MCH RBC QN AUTO: 28.5 PG (ref 27–31)
MCHC RBC AUTO-ENTMCNC: 33 % (ref 32–36)
MCV RBC AUTO: 86.6 FL (ref 78–100)
MONOCYTES ABSOLUTE: 0.3 K/CU MM
MONOCYTES RELATIVE PERCENT: 6.2 % (ref 0–4)
NITRITE URINE, QUANTITATIVE: NEGATIVE
NON SQUAM EPI CELLS: 1 /HPF
NUCLEATED RBC %: 0 %
PDW BLD-RTO: 12.4 % (ref 11.7–14.9)
PH, URINE: 6 (ref 5–8)
PLATELET # BLD: 207 K/CU MM (ref 140–440)
PMV BLD AUTO: 9.8 FL (ref 7.5–11.1)
POTASSIUM SERPL-SCNC: 4.5 MMOL/L (ref 3.5–5.1)
PROTEIN UA: NEGATIVE MG/DL
RBC # BLD: 4.17 M/CU MM (ref 4.2–5.4)
RBC URINE: 4 /HPF (ref 0–6)
SEGMENTED NEUTROPHILS ABSOLUTE COUNT: 1.2 K/CU MM
SEGMENTED NEUTROPHILS RELATIVE PERCENT: 29.7 % (ref 36–66)
SODIUM BLD-SCNC: 140 MMOL/L (ref 135–145)
SPECIFIC GRAVITY UA: 1.02 (ref 1–1.03)
SQUAMOUS EPITHELIAL: 9 /HPF
TOTAL IMMATURE NEUTOROPHIL: 0 K/CU MM
TOTAL NUCLEATED RBC: 0 K/CU MM
TOTAL PROTEIN: 6.5 GM/DL (ref 6.4–8.2)
TRICHOMONAS: ABNORMAL /HPF
UROBILINOGEN, URINE: 0.2 MG/DL (ref 0.2–1)
WBC # BLD: 4.2 K/CU MM (ref 4–10.5)
WBC UA: 16 /HPF (ref 0–5)

## 2024-01-13 PROCEDURE — 6360000004 HC RX CONTRAST MEDICATION: Performed by: NURSE PRACTITIONER

## 2024-01-13 PROCEDURE — 74177 CT ABD & PELVIS W/CONTRAST: CPT

## 2024-01-13 PROCEDURE — C9113 INJ PANTOPRAZOLE SODIUM, VIA: HCPCS | Performed by: NURSE PRACTITIONER

## 2024-01-13 PROCEDURE — 2580000003 HC RX 258: Performed by: NURSE PRACTITIONER

## 2024-01-13 PROCEDURE — 83690 ASSAY OF LIPASE: CPT

## 2024-01-13 PROCEDURE — 83605 ASSAY OF LACTIC ACID: CPT

## 2024-01-13 PROCEDURE — 6360000002 HC RX W HCPCS: Performed by: NURSE PRACTITIONER

## 2024-01-13 PROCEDURE — G0378 HOSPITAL OBSERVATION PER HR: HCPCS

## 2024-01-13 PROCEDURE — 96375 TX/PRO/DX INJ NEW DRUG ADDON: CPT

## 2024-01-13 PROCEDURE — 99285 EMERGENCY DEPT VISIT HI MDM: CPT

## 2024-01-13 PROCEDURE — 6360000002 HC RX W HCPCS: Performed by: STUDENT IN AN ORGANIZED HEALTH CARE EDUCATION/TRAINING PROGRAM

## 2024-01-13 PROCEDURE — 96374 THER/PROPH/DIAG INJ IV PUSH: CPT

## 2024-01-13 PROCEDURE — 85025 COMPLETE CBC W/AUTO DIFF WBC: CPT

## 2024-01-13 PROCEDURE — 2580000003 HC RX 258: Performed by: STUDENT IN AN ORGANIZED HEALTH CARE EDUCATION/TRAINING PROGRAM

## 2024-01-13 PROCEDURE — 81001 URINALYSIS AUTO W/SCOPE: CPT

## 2024-01-13 PROCEDURE — 80053 COMPREHEN METABOLIC PANEL: CPT

## 2024-01-13 PROCEDURE — 96361 HYDRATE IV INFUSION ADD-ON: CPT

## 2024-01-13 PROCEDURE — 96376 TX/PRO/DX INJ SAME DRUG ADON: CPT

## 2024-01-13 RX ORDER — POLYETHYLENE GLYCOL 3350 17 G/17G
17 POWDER, FOR SOLUTION ORAL DAILY PRN
Status: DISCONTINUED | OUTPATIENT
Start: 2024-01-13 | End: 2024-01-17 | Stop reason: HOSPADM

## 2024-01-13 RX ORDER — POTASSIUM CHLORIDE 20 MEQ/1
40 TABLET, EXTENDED RELEASE ORAL PRN
Status: DISCONTINUED | OUTPATIENT
Start: 2024-01-13 | End: 2024-01-17 | Stop reason: HOSPADM

## 2024-01-13 RX ORDER — CHOLECALCIFEROL (VITAMIN D3) 125 MCG
10 CAPSULE ORAL NIGHTLY PRN
Status: DISCONTINUED | OUTPATIENT
Start: 2024-01-13 | End: 2024-01-17 | Stop reason: HOSPADM

## 2024-01-13 RX ORDER — PANTOPRAZOLE SODIUM 40 MG/10ML
40 INJECTION, POWDER, LYOPHILIZED, FOR SOLUTION INTRAVENOUS ONCE
Status: COMPLETED | OUTPATIENT
Start: 2024-01-13 | End: 2024-01-13

## 2024-01-13 RX ORDER — ACETAMINOPHEN 650 MG/1
650 SUPPOSITORY RECTAL EVERY 6 HOURS PRN
Status: DISCONTINUED | OUTPATIENT
Start: 2024-01-13 | End: 2024-01-17 | Stop reason: HOSPADM

## 2024-01-13 RX ORDER — ONDANSETRON 4 MG/1
4 TABLET, FILM COATED ORAL EVERY 8 HOURS PRN
Status: DISCONTINUED | OUTPATIENT
Start: 2024-01-13 | End: 2024-01-13

## 2024-01-13 RX ORDER — POTASSIUM CHLORIDE 7.45 MG/ML
10 INJECTION INTRAVENOUS PRN
Status: DISCONTINUED | OUTPATIENT
Start: 2024-01-13 | End: 2024-01-17 | Stop reason: HOSPADM

## 2024-01-13 RX ORDER — SCOLOPAMINE TRANSDERMAL SYSTEM 1 MG/1
1 PATCH, EXTENDED RELEASE TRANSDERMAL
Status: DISCONTINUED | OUTPATIENT
Start: 2024-01-14 | End: 2024-01-17 | Stop reason: HOSPADM

## 2024-01-13 RX ORDER — SENNOSIDES A AND B 8.6 MG/1
1 TABLET, FILM COATED ORAL DAILY
Status: DISCONTINUED | OUTPATIENT
Start: 2024-01-14 | End: 2024-01-17 | Stop reason: HOSPADM

## 2024-01-13 RX ORDER — ESTRADIOL 1 MG/1
0.5 TABLET ORAL DAILY
Status: DISCONTINUED | OUTPATIENT
Start: 2024-01-14 | End: 2024-01-17 | Stop reason: HOSPADM

## 2024-01-13 RX ORDER — 0.9 % SODIUM CHLORIDE 0.9 %
1000 INTRAVENOUS SOLUTION INTRAVENOUS ONCE
Status: COMPLETED | OUTPATIENT
Start: 2024-01-13 | End: 2024-01-13

## 2024-01-13 RX ORDER — ONDANSETRON 4 MG/1
4 TABLET, ORALLY DISINTEGRATING ORAL EVERY 8 HOURS PRN
Status: DISCONTINUED | OUTPATIENT
Start: 2024-01-13 | End: 2024-01-17 | Stop reason: HOSPADM

## 2024-01-13 RX ORDER — PROMETHAZINE HYDROCHLORIDE 25 MG/1
25 TABLET ORAL EVERY 6 HOURS PRN
Status: DISCONTINUED | OUTPATIENT
Start: 2024-01-13 | End: 2024-01-14

## 2024-01-13 RX ORDER — SENNOSIDES A AND B 8.6 MG/1
1 TABLET, FILM COATED ORAL DAILY
COMMUNITY

## 2024-01-13 RX ORDER — ACETAMINOPHEN 325 MG/1
650 TABLET ORAL EVERY 6 HOURS PRN
Status: DISCONTINUED | OUTPATIENT
Start: 2024-01-13 | End: 2024-01-17 | Stop reason: HOSPADM

## 2024-01-13 RX ORDER — DULOXETIN HYDROCHLORIDE 30 MG/1
60 CAPSULE, DELAYED RELEASE ORAL DAILY
Status: DISCONTINUED | OUTPATIENT
Start: 2024-01-14 | End: 2024-01-17 | Stop reason: HOSPADM

## 2024-01-13 RX ORDER — ONDANSETRON 2 MG/ML
4 INJECTION INTRAMUSCULAR; INTRAVENOUS ONCE
Status: COMPLETED | OUTPATIENT
Start: 2024-01-13 | End: 2024-01-13

## 2024-01-13 RX ORDER — MORPHINE SULFATE 2 MG/ML
2 INJECTION, SOLUTION INTRAMUSCULAR; INTRAVENOUS EVERY 4 HOURS PRN
Status: COMPLETED | OUTPATIENT
Start: 2024-01-13 | End: 2024-01-14

## 2024-01-13 RX ORDER — PANTOPRAZOLE SODIUM 40 MG/1
40 TABLET, DELAYED RELEASE ORAL
Status: DISCONTINUED | OUTPATIENT
Start: 2024-01-14 | End: 2024-01-17 | Stop reason: HOSPADM

## 2024-01-13 RX ORDER — DIPHENHYDRAMINE HCL 25 MG
25 TABLET ORAL EVERY 6 HOURS PRN
Status: DISCONTINUED | OUTPATIENT
Start: 2024-01-13 | End: 2024-01-14

## 2024-01-13 RX ORDER — SODIUM CHLORIDE 0.9 % (FLUSH) 0.9 %
5-40 SYRINGE (ML) INJECTION PRN
Status: DISCONTINUED | OUTPATIENT
Start: 2024-01-13 | End: 2024-01-17 | Stop reason: HOSPADM

## 2024-01-13 RX ORDER — ENOXAPARIN SODIUM 100 MG/ML
30 INJECTION SUBCUTANEOUS 2 TIMES DAILY
Status: DISCONTINUED | OUTPATIENT
Start: 2024-01-14 | End: 2024-01-17 | Stop reason: HOSPADM

## 2024-01-13 RX ORDER — SODIUM CHLORIDE 9 MG/ML
INJECTION, SOLUTION INTRAVENOUS PRN
Status: DISCONTINUED | OUTPATIENT
Start: 2024-01-13 | End: 2024-01-17 | Stop reason: HOSPADM

## 2024-01-13 RX ORDER — LEVETIRACETAM 500 MG/5ML
1000 INJECTION, SOLUTION, CONCENTRATE INTRAVENOUS EVERY 12 HOURS
Status: DISCONTINUED | OUTPATIENT
Start: 2024-01-14 | End: 2024-01-15

## 2024-01-13 RX ORDER — SODIUM CHLORIDE, SODIUM LACTATE, POTASSIUM CHLORIDE, CALCIUM CHLORIDE 600; 310; 30; 20 MG/100ML; MG/100ML; MG/100ML; MG/100ML
INJECTION, SOLUTION INTRAVENOUS CONTINUOUS
Status: ACTIVE | OUTPATIENT
Start: 2024-01-13 | End: 2024-01-14

## 2024-01-13 RX ORDER — SODIUM CHLORIDE 0.9 % (FLUSH) 0.9 %
5-40 SYRINGE (ML) INJECTION EVERY 12 HOURS SCHEDULED
Status: DISCONTINUED | OUTPATIENT
Start: 2024-01-13 | End: 2024-01-17 | Stop reason: HOSPADM

## 2024-01-13 RX ORDER — PROMETHAZINE HYDROCHLORIDE 25 MG/1
25 TABLET ORAL NIGHTLY PRN
COMMUNITY

## 2024-01-13 RX ORDER — LEVETIRACETAM 500 MG/5ML
1000 INJECTION, SOLUTION, CONCENTRATE INTRAVENOUS ONCE
Status: COMPLETED | OUTPATIENT
Start: 2024-01-13 | End: 2024-01-13

## 2024-01-13 RX ORDER — POLYETHYLENE GLYCOL 3350 17 G/17G
17 POWDER, FOR SOLUTION ORAL 2 TIMES DAILY
Status: DISCONTINUED | OUTPATIENT
Start: 2024-01-13 | End: 2024-01-17 | Stop reason: HOSPADM

## 2024-01-13 RX ORDER — MAGNESIUM SULFATE IN WATER 40 MG/ML
2000 INJECTION, SOLUTION INTRAVENOUS PRN
Status: DISCONTINUED | OUTPATIENT
Start: 2024-01-13 | End: 2024-01-17 | Stop reason: HOSPADM

## 2024-01-13 RX ORDER — TIZANIDINE 4 MG/1
4 TABLET ORAL EVERY 8 HOURS PRN
Status: DISCONTINUED | OUTPATIENT
Start: 2024-01-13 | End: 2024-01-17 | Stop reason: HOSPADM

## 2024-01-13 RX ORDER — DIPHENHYDRAMINE HCL 25 MG
25 TABLET ORAL EVERY 6 HOURS PRN
Status: ON HOLD | COMMUNITY
End: 2024-01-17 | Stop reason: HOSPADM

## 2024-01-13 RX ORDER — ONDANSETRON 2 MG/ML
4 INJECTION INTRAMUSCULAR; INTRAVENOUS EVERY 6 HOURS PRN
Status: DISCONTINUED | OUTPATIENT
Start: 2024-01-13 | End: 2024-01-17 | Stop reason: HOSPADM

## 2024-01-13 RX ORDER — PANTOPRAZOLE SODIUM 40 MG/1
40 TABLET, DELAYED RELEASE ORAL 2 TIMES DAILY
COMMUNITY

## 2024-01-13 RX ORDER — ALBUTEROL SULFATE 90 UG/1
2 AEROSOL, METERED RESPIRATORY (INHALATION) EVERY 6 HOURS PRN
Status: DISCONTINUED | OUTPATIENT
Start: 2024-01-13 | End: 2024-01-17 | Stop reason: HOSPADM

## 2024-01-13 RX ORDER — CLONIDINE HYDROCHLORIDE 0.1 MG/1
0.1 TABLET ORAL 2 TIMES DAILY
Status: DISCONTINUED | OUTPATIENT
Start: 2024-01-14 | End: 2024-01-17 | Stop reason: HOSPADM

## 2024-01-13 RX ORDER — GABAPENTIN 400 MG/1
800 CAPSULE ORAL 3 TIMES DAILY
Status: DISCONTINUED | OUTPATIENT
Start: 2024-01-14 | End: 2024-01-17 | Stop reason: HOSPADM

## 2024-01-13 RX ORDER — HYDROXYZINE 50 MG/1
50 TABLET, FILM COATED ORAL 2 TIMES DAILY PRN
Status: DISCONTINUED | OUTPATIENT
Start: 2024-01-13 | End: 2024-01-17 | Stop reason: HOSPADM

## 2024-01-13 RX ADMIN — SODIUM CHLORIDE 1000 ML: 9 INJECTION, SOLUTION INTRAVENOUS at 17:05

## 2024-01-13 RX ADMIN — MORPHINE SULFATE 2 MG: 2 INJECTION, SOLUTION INTRAMUSCULAR; INTRAVENOUS at 22:57

## 2024-01-13 RX ADMIN — HYDROMORPHONE HYDROCHLORIDE 1 MG: 1 INJECTION, SOLUTION INTRAMUSCULAR; INTRAVENOUS; SUBCUTANEOUS at 17:54

## 2024-01-13 RX ADMIN — PANTOPRAZOLE SODIUM 40 MG: 40 INJECTION, POWDER, FOR SOLUTION INTRAVENOUS at 20:13

## 2024-01-13 RX ADMIN — HYDROMORPHONE HYDROCHLORIDE 1 MG: 1 INJECTION, SOLUTION INTRAMUSCULAR; INTRAVENOUS; SUBCUTANEOUS at 20:13

## 2024-01-13 RX ADMIN — SODIUM CHLORIDE, POTASSIUM CHLORIDE, SODIUM LACTATE AND CALCIUM CHLORIDE: 600; 310; 30; 20 INJECTION, SOLUTION INTRAVENOUS at 22:48

## 2024-01-13 RX ADMIN — ONDANSETRON 4 MG: 2 INJECTION INTRAMUSCULAR; INTRAVENOUS at 20:13

## 2024-01-13 RX ADMIN — ONDANSETRON 4 MG: 2 INJECTION INTRAMUSCULAR; INTRAVENOUS at 17:05

## 2024-01-13 RX ADMIN — IOPAMIDOL 80 ML: 755 INJECTION, SOLUTION INTRAVENOUS at 18:04

## 2024-01-13 RX ADMIN — SODIUM CHLORIDE, PRESERVATIVE FREE 10 ML: 5 INJECTION INTRAVENOUS at 22:48

## 2024-01-13 RX ADMIN — LEVETIRACETAM 1000 MG: 500 INJECTION INTRAVENOUS at 18:15

## 2024-01-13 RX ADMIN — HYDROMORPHONE HYDROCHLORIDE 1 MG: 1 INJECTION, SOLUTION INTRAMUSCULAR; INTRAVENOUS; SUBCUTANEOUS at 17:05

## 2024-01-13 ASSESSMENT — PAIN SCALES - GENERAL
PAINLEVEL_OUTOF10: 10
PAINLEVEL_OUTOF10: 10
PAINLEVEL_OUTOF10: 7
PAINLEVEL_OUTOF10: 0

## 2024-01-13 ASSESSMENT — PAIN DESCRIPTION - LOCATION: LOCATION: BACK;ABDOMEN

## 2024-01-13 ASSESSMENT — PAIN DESCRIPTION - DESCRIPTORS: DESCRIPTORS: ACHING

## 2024-01-13 ASSESSMENT — PAIN DESCRIPTION - ORIENTATION: ORIENTATION: LEFT;RIGHT;LOWER

## 2024-01-13 NOTE — ED PROVIDER NOTES
[facility] EMERGENCY DEPARTMENT  EMERGENCY DEPARTMENT ENCOUNTER        Pt Name: Andre Pope  MRN: [id number]  Birthdate 1970  Date of evaluation: 1/13/2024  Provider: MICHAEL THOMPSON - CNP  PCP: Cholo Royal MD    KEN. I have evaluated this patient.        Triage CHIEF COMPLAINT       Chief Complaint   Patient presents with    Flank Pain         HISTORY OF PRESENT ILLNESS      Chief Complaint: right abdominal pain, flank pain    Andre Pope is a 53 y.o. female who presents for evaluation of right absominal pain radiating to back that started 4 to 5 days ago.  Patient does have history of chronic recurrent abdominal pain secondary to many abdominal surgeries.  She has had prior cholecystectomy, hysterectomy, gastric sleeve with revision and secondary Jet-en-Y procedure.  She states she has a history of recurrent pancreatitis dating back to the 90s after having complications after cholecystectomy.  Vomiting started 2 days ago, has vomited 5x in 12 hours.  Has been unable to keep down her medications.  She does have a history of seizures, has not taken her Keppra.  No recent seizure activity.  Denies urinary symptoms.  No F/C, diarrhea.  She also has a history of kidney stones in the past.    Nursing Notes were all reviewed and agreed with or any disagreements were addressed in the HPI.    REVIEW OF SYSTEMS     Pertinent ROS as noted in HPI.      PAST MEDICAL HISTORY     Past Medical History:   Diagnosis Date    Acid reflux     Anemia     Anxiety     Arthritis     Hands, Back And Ankles    Arthritis     Bleeding ulcer 2014    \"I Had Ulcers In My Stomach And Colon\"/ per pt on 8/12/2019\"they said recently having some blood in my stomach- in July ( 2019)could not find where coming from \"    Bronchitis Last Episode 2014    Chronic back pain     Chronic pain     [facility]    COPD (chronic obstructive pulmonary disease) ([facility])

## 2024-01-14 PROCEDURE — 6360000002 HC RX W HCPCS: Performed by: STUDENT IN AN ORGANIZED HEALTH CARE EDUCATION/TRAINING PROGRAM

## 2024-01-14 PROCEDURE — 6370000000 HC RX 637 (ALT 250 FOR IP): Performed by: STUDENT IN AN ORGANIZED HEALTH CARE EDUCATION/TRAINING PROGRAM

## 2024-01-14 PROCEDURE — 6370000000 HC RX 637 (ALT 250 FOR IP): Performed by: PHYSICIAN ASSISTANT

## 2024-01-14 PROCEDURE — 87086 URINE CULTURE/COLONY COUNT: CPT

## 2024-01-14 PROCEDURE — 94761 N-INVAS EAR/PLS OXIMETRY MLT: CPT

## 2024-01-14 PROCEDURE — 6360000002 HC RX W HCPCS: Performed by: PHYSICIAN ASSISTANT

## 2024-01-14 PROCEDURE — 2580000003 HC RX 258: Performed by: STUDENT IN AN ORGANIZED HEALTH CARE EDUCATION/TRAINING PROGRAM

## 2024-01-14 PROCEDURE — G0378 HOSPITAL OBSERVATION PER HR: HCPCS

## 2024-01-14 PROCEDURE — 6360000002 HC RX W HCPCS: Performed by: HOSPITALIST

## 2024-01-14 RX ORDER — PROMETHAZINE HYDROCHLORIDE 25 MG/ML
12.5 INJECTION, SOLUTION INTRAMUSCULAR; INTRAVENOUS EVERY 6 HOURS PRN
Status: DISCONTINUED | OUTPATIENT
Start: 2024-01-14 | End: 2024-01-17 | Stop reason: HOSPADM

## 2024-01-14 RX ORDER — OXYCODONE HYDROCHLORIDE AND ACETAMINOPHEN 5; 325 MG/1; MG/1
1 TABLET ORAL EVERY 4 HOURS PRN
Status: DISCONTINUED | OUTPATIENT
Start: 2024-01-14 | End: 2024-01-17 | Stop reason: HOSPADM

## 2024-01-14 RX ADMIN — Medication 10 MG: at 01:02

## 2024-01-14 RX ADMIN — ENOXAPARIN SODIUM 30 MG: 100 INJECTION SUBCUTANEOUS at 08:28

## 2024-01-14 RX ADMIN — ONDANSETRON 4 MG: 2 INJECTION INTRAMUSCULAR; INTRAVENOUS at 18:05

## 2024-01-14 RX ADMIN — PROMETHAZINE HYDROCHLORIDE 25 MG: 25 TABLET ORAL at 00:59

## 2024-01-14 RX ADMIN — ONDANSETRON 4 MG: 2 INJECTION INTRAMUSCULAR; INTRAVENOUS at 05:49

## 2024-01-14 RX ADMIN — MORPHINE SULFATE 2 MG: 2 INJECTION, SOLUTION INTRAMUSCULAR; INTRAVENOUS at 07:22

## 2024-01-14 RX ADMIN — SODIUM CHLORIDE, PRESERVATIVE FREE 10 ML: 5 INJECTION INTRAVENOUS at 21:36

## 2024-01-14 RX ADMIN — PANTOPRAZOLE SODIUM 40 MG: 40 TABLET, DELAYED RELEASE ORAL at 05:50

## 2024-01-14 RX ADMIN — ENOXAPARIN SODIUM 30 MG: 100 INJECTION SUBCUTANEOUS at 21:37

## 2024-01-14 RX ADMIN — SODIUM CHLORIDE, PRESERVATIVE FREE 10 ML: 5 INJECTION INTRAVENOUS at 03:12

## 2024-01-14 RX ADMIN — SODIUM CHLORIDE, PRESERVATIVE FREE 10 ML: 5 INJECTION INTRAVENOUS at 07:23

## 2024-01-14 RX ADMIN — LEVETIRACETAM 1000 MG: 100 INJECTION, SOLUTION INTRAVENOUS at 21:36

## 2024-01-14 RX ADMIN — MORPHINE SULFATE 2 MG: 2 INJECTION, SOLUTION INTRAMUSCULAR; INTRAVENOUS at 03:11

## 2024-01-14 RX ADMIN — TIZANIDINE 4 MG: 4 TABLET ORAL at 15:32

## 2024-01-14 RX ADMIN — DULOXETINE HYDROCHLORIDE 60 MG: 30 CAPSULE, DELAYED RELEASE ORAL at 08:27

## 2024-01-14 RX ADMIN — SODIUM CHLORIDE, POTASSIUM CHLORIDE, SODIUM LACTATE AND CALCIUM CHLORIDE: 600; 310; 30; 20 INJECTION, SOLUTION INTRAVENOUS at 11:01

## 2024-01-14 RX ADMIN — Medication 10 MG: at 21:34

## 2024-01-14 RX ADMIN — GABAPENTIN 800 MG: 400 CAPSULE ORAL at 08:27

## 2024-01-14 RX ADMIN — TIZANIDINE 4 MG: 4 TABLET ORAL at 21:35

## 2024-01-14 RX ADMIN — OXYCODONE AND ACETAMINOPHEN 1 TABLET: 5; 325 TABLET ORAL at 22:25

## 2024-01-14 RX ADMIN — GABAPENTIN 800 MG: 400 CAPSULE ORAL at 21:35

## 2024-01-14 RX ADMIN — PROMETHAZINE HYDROCHLORIDE 25 MG: 25 TABLET ORAL at 14:42

## 2024-01-14 RX ADMIN — ESTRADIOL 0.5 MG: 1 TABLET ORAL at 08:37

## 2024-01-14 RX ADMIN — ONDANSETRON 4 MG: 2 INJECTION INTRAMUSCULAR; INTRAVENOUS at 12:01

## 2024-01-14 RX ADMIN — OXYCODONE AND ACETAMINOPHEN 1 TABLET: 5; 325 TABLET ORAL at 11:10

## 2024-01-14 RX ADMIN — LEVETIRACETAM 1000 MG: 100 INJECTION, SOLUTION INTRAVENOUS at 08:27

## 2024-01-14 RX ADMIN — PANTOPRAZOLE SODIUM 40 MG: 40 TABLET, DELAYED RELEASE ORAL at 15:22

## 2024-01-14 RX ADMIN — CLONIDINE HYDROCHLORIDE 0.1 MG: 0.1 TABLET ORAL at 00:21

## 2024-01-14 RX ADMIN — PROMETHAZINE HYDROCHLORIDE 12.5 MG: 25 INJECTION INTRAMUSCULAR; INTRAVENOUS at 20:41

## 2024-01-14 RX ADMIN — HYDROXYZINE HYDROCHLORIDE 50 MG: 50 TABLET, FILM COATED ORAL at 00:22

## 2024-01-14 RX ADMIN — CLONIDINE HYDROCHLORIDE 0.1 MG: 0.1 TABLET ORAL at 21:34

## 2024-01-14 RX ADMIN — TIZANIDINE 4 MG: 4 TABLET ORAL at 00:21

## 2024-01-14 RX ADMIN — GABAPENTIN 800 MG: 400 CAPSULE ORAL at 00:22

## 2024-01-14 RX ADMIN — HYDROMORPHONE HYDROCHLORIDE 0.5 MG: 1 INJECTION, SOLUTION INTRAMUSCULAR; INTRAVENOUS; SUBCUTANEOUS at 13:49

## 2024-01-14 ASSESSMENT — PAIN DESCRIPTION - ORIENTATION
ORIENTATION: RIGHT
ORIENTATION: RIGHT;LEFT
ORIENTATION: LOWER
ORIENTATION: RIGHT
ORIENTATION: RIGHT
ORIENTATION: RIGHT;UPPER
ORIENTATION: RIGHT;MID
ORIENTATION: RIGHT;LEFT

## 2024-01-14 ASSESSMENT — PAIN DESCRIPTION - DESCRIPTORS
DESCRIPTORS: ACHING
DESCRIPTORS: PRESSURE;SHARP;SQUEEZING
DESCRIPTORS: DULL;SHARP
DESCRIPTORS: SQUEEZING;PRESSURE;SHARP
DESCRIPTORS: ACHING

## 2024-01-14 ASSESSMENT — PAIN SCALES - GENERAL
PAINLEVEL_OUTOF10: 5
PAINLEVEL_OUTOF10: 6
PAINLEVEL_OUTOF10: 0
PAINLEVEL_OUTOF10: 7
PAINLEVEL_OUTOF10: 6
PAINLEVEL_OUTOF10: 6
PAINLEVEL_OUTOF10: 5
PAINLEVEL_OUTOF10: 4
PAINLEVEL_OUTOF10: 3
PAINLEVEL_OUTOF10: 0
PAINLEVEL_OUTOF10: 6
PAINLEVEL_OUTOF10: 7
PAINLEVEL_OUTOF10: 3
PAINLEVEL_OUTOF10: 4
PAINLEVEL_OUTOF10: 7
PAINLEVEL_OUTOF10: 7
PAINLEVEL_OUTOF10: 5

## 2024-01-14 ASSESSMENT — PAIN DESCRIPTION - LOCATION
LOCATION: ABDOMEN
LOCATION: ABDOMEN
LOCATION: ABDOMEN;BACK
LOCATION: ABDOMEN;FLANK
LOCATION: ABDOMEN;BACK
LOCATION: ABDOMEN;BACK;FLANK
LOCATION: ABDOMEN;FLANK
LOCATION: BACK;ABDOMEN
LOCATION: ABDOMEN;BACK
LOCATION: ABDOMEN

## 2024-01-14 ASSESSMENT — PAIN - FUNCTIONAL ASSESSMENT
PAIN_FUNCTIONAL_ASSESSMENT: PREVENTS OR INTERFERES SOME ACTIVE ACTIVITIES AND ADLS
PAIN_FUNCTIONAL_ASSESSMENT: ACTIVITIES ARE NOT PREVENTED
PAIN_FUNCTIONAL_ASSESSMENT: PREVENTS OR INTERFERES SOME ACTIVE ACTIVITIES AND ADLS

## 2024-01-14 ASSESSMENT — PAIN SCALES - WONG BAKER
WONGBAKER_NUMERICALRESPONSE: 0

## 2024-01-14 NOTE — PLAN OF CARE
Problem: Pain  Goal: Verbalizes/displays adequate comfort level or baseline comfort level  Outcome: HH/HSPC Resolved Resume Next Certification Period     Problem: Discharge Planning  Goal: Discharge to home or other facility with appropriate resources  Outcome: Progressing     Problem: Safety - Adult  Goal: Free from fall injury  Outcome: Progressing

## 2024-01-14 NOTE — H&P
control, iterative reconstruction, and/or weight based adjustment of the mA/kV was utilized to reduce the radiation dose to as low as reasonably achievable. COMPARISON: Multiple CT abdomen and pelvis dating back to 7/21/2018. HISTORY: ORDERING SYSTEM PROVIDED HISTORY: right abdominal/flank pain.  Hx both pancreatitis and kidney stones. TECHNOLOGIST PROVIDED HISTORY: Additional Contrast?->None Reason for exam:->right abdominal/flank pain.  Hx both pancreatitis and kidney stones. Decision Support Exception - unselect if not a suspected or confirmed emergency medical condition->Emergency Medical Condition (MA) Reason for Exam: right abdominal/flank pain.  Hx both pancreatitis and kidney stones FINDINGS: Lower Chest: No confluent airspace opacity or pleural effusion seen at the lung bases. Organs: The liver appears homogeneous without discrete hepatic lesion seen. There is redemonstration of pneumobilia.  The patient is again noted to be status post cholecystectomy.  There is prominence of the biliary ductal system with common bile duct measuring approximately 13 mm.  There are 1-2 mm foci of mineralization about the uncinate process of the pancreas.  1 of these appear to be new from the prior study.  No significant peripancreatic inflammatory changes are seen.  The spleen appears homogeneous.  The adrenal glands appear within normal limits.  The kidneys appear to enhance symmetrically.  There is redemonstration of slowly growing 1.3 cm inferior pole left renal lesion previously characterized as angiomyolipoma.  There are 1-2 mm nonobstructing left renal stones without evidence of hydronephrosis seen.  There is a punctate nonobstructing stone in the inferior pole of the right kidney. GI/Bowel: Evaluation of the GI tract is limited without the benefit of enteric contrast.  The appendix is not visualized.  Again seen are changes of prior gastric bypass surgery.  No evidence of bowel obstruction seen Pelvis: The urinary

## 2024-01-14 NOTE — ED NOTES
Obs 8 ready bed  
Report received from JACKIE Poon. All questions answered at this time.  
kidney cyst but not sure which side\" per pt on 7/15/2020    Kidney stone     Lupus (HCC) Dx 2013    \"Rheumatoid Lupus\"    MDRO (multiple drug resistant organisms) resistance     4 months ago chest from mediport    Morbid obesity (HCC)     MRSA bacteremia     Nausea     \"Most Of The Time\"    Osteomyelitis of fifth toe of left foot (HCC)     Pain management     Sees Dr. Patton, Once A Month    Pancreatitis chronic Dx 2001    Pneumonia Dx 11-15    Seizures (HCC)     Shortness of breath on exertion     Shortness of breath on exertion     Sleep apnea     Has CPAP\"no longer use the cpap since lost weight\"    Staph infection Dx 11-15    Left Foot    Staph infection Dx 11-15    \"Left Foot\"    Teeth missing     Upper And Lower    Thyroid disease     Wears glasses     To Read       Assessment    Vitals:        Vitals:    01/13/24 1446 01/13/24 1817 01/13/24 1903 01/13/24 2020   BP: 114/81 113/78 (!) 110/59 92/69   Pulse: 72 70 68 72   Resp: 19      Temp: 97.6 °F (36.4 °C)      TempSrc: Oral      SpO2: 100% 99% 95% 97%     PO Status: Regular  O2 Flow Rate:      Cardiac Rhythm: NSR  Last documented pain medication administered: 2013  NIH Score: NIH     Active LDA's:      Pertinent or High Risk Medications/Drips: no   If Yes, please provide details:   Blood Product Administration: no  If Yes, please provide details:     Recommendation    Incomplete orders   Additional Comments:    If any further questions, please call Sending RN at [phone]    Electronically signed by: Electronically signed by Tip Hines RN on 1/13/2024 at 8:21 PM

## 2024-01-15 LAB
ALBUMIN SERPL-MCNC: 3.5 GM/DL (ref 3.4–5)
ALP BLD-CCNC: 109 IU/L (ref 40–128)
ALT SERPL-CCNC: 14 U/L (ref 10–40)
ANION GAP SERPL CALCULATED.3IONS-SCNC: 5 MMOL/L (ref 7–16)
AST SERPL-CCNC: 15 IU/L (ref 15–37)
BASOPHILS ABSOLUTE: 0 K/CU MM
BASOPHILS RELATIVE PERCENT: 0.7 % (ref 0–1)
BILIRUB SERPL-MCNC: 0.2 MG/DL (ref 0–1)
BUN SERPL-MCNC: 10 MG/DL (ref 6–23)
CALCIUM SERPL-MCNC: 9.4 MG/DL (ref 8.3–10.6)
CHLORIDE BLD-SCNC: 110 MMOL/L (ref 99–110)
CO2: 26 MMOL/L (ref 21–32)
CREAT SERPL-MCNC: 0.6 MG/DL (ref 0.6–1.1)
CULTURE: NORMAL
DIFFERENTIAL TYPE: ABNORMAL
EOSINOPHILS ABSOLUTE: 0.2 K/CU MM
EOSINOPHILS RELATIVE PERCENT: 5.3 % (ref 0–3)
GFR SERPL CREATININE-BSD FRML MDRD: >60 ML/MIN/1.73M2
GLUCOSE SERPL-MCNC: 92 MG/DL (ref 70–99)
HCT VFR BLD CALC: 31.6 % (ref 37–47)
HEMOGLOBIN: 10.1 GM/DL (ref 12.5–16)
IMMATURE NEUTROPHIL %: 0.2 % (ref 0–0.43)
LIPASE: 18 IU/L (ref 13–60)
LYMPHOCYTES ABSOLUTE: 2.3 K/CU MM
LYMPHOCYTES RELATIVE PERCENT: 52.7 % (ref 24–44)
Lab: NORMAL
MCH RBC QN AUTO: 28.4 PG (ref 27–31)
MCHC RBC AUTO-ENTMCNC: 32 % (ref 32–36)
MCV RBC AUTO: 88.8 FL (ref 78–100)
MONOCYTES ABSOLUTE: 0.4 K/CU MM
MONOCYTES RELATIVE PERCENT: 8.1 % (ref 0–4)
NUCLEATED RBC %: 0 %
PDW BLD-RTO: 12.6 % (ref 11.7–14.9)
PLATELET # BLD: 171 K/CU MM (ref 140–440)
PMV BLD AUTO: 9.9 FL (ref 7.5–11.1)
POTASSIUM SERPL-SCNC: 4.3 MMOL/L (ref 3.5–5.1)
RBC # BLD: 3.56 M/CU MM (ref 4.2–5.4)
SEGMENTED NEUTROPHILS ABSOLUTE COUNT: 1.4 K/CU MM
SEGMENTED NEUTROPHILS RELATIVE PERCENT: 33 % (ref 36–66)
SODIUM BLD-SCNC: 141 MMOL/L (ref 135–145)
SPECIMEN: NORMAL
TOTAL IMMATURE NEUTOROPHIL: 0.01 K/CU MM
TOTAL NUCLEATED RBC: 0 K/CU MM
TOTAL PROTEIN: 5.4 GM/DL (ref 6.4–8.2)
WBC # BLD: 4.3 K/CU MM (ref 4–10.5)

## 2024-01-15 PROCEDURE — 80053 COMPREHEN METABOLIC PANEL: CPT

## 2024-01-15 PROCEDURE — 6370000000 HC RX 637 (ALT 250 FOR IP): Performed by: PHYSICIAN ASSISTANT

## 2024-01-15 PROCEDURE — G0378 HOSPITAL OBSERVATION PER HR: HCPCS

## 2024-01-15 PROCEDURE — 2580000003 HC RX 258: Performed by: STUDENT IN AN ORGANIZED HEALTH CARE EDUCATION/TRAINING PROGRAM

## 2024-01-15 PROCEDURE — 6360000002 HC RX W HCPCS: Performed by: HOSPITALIST

## 2024-01-15 PROCEDURE — 6360000002 HC RX W HCPCS: Performed by: STUDENT IN AN ORGANIZED HEALTH CARE EDUCATION/TRAINING PROGRAM

## 2024-01-15 PROCEDURE — 6370000000 HC RX 637 (ALT 250 FOR IP): Performed by: STUDENT IN AN ORGANIZED HEALTH CARE EDUCATION/TRAINING PROGRAM

## 2024-01-15 PROCEDURE — 83690 ASSAY OF LIPASE: CPT

## 2024-01-15 PROCEDURE — 6360000002 HC RX W HCPCS: Performed by: PHYSICIAN ASSISTANT

## 2024-01-15 PROCEDURE — 85025 COMPLETE CBC W/AUTO DIFF WBC: CPT

## 2024-01-15 PROCEDURE — 1200000000 HC SEMI PRIVATE

## 2024-01-15 RX ORDER — LEVETIRACETAM 500 MG/1
1000 TABLET ORAL 2 TIMES DAILY
Status: DISCONTINUED | OUTPATIENT
Start: 2024-01-15 | End: 2024-01-17 | Stop reason: HOSPADM

## 2024-01-15 RX ORDER — SODIUM CHLORIDE, SODIUM LACTATE, POTASSIUM CHLORIDE, CALCIUM CHLORIDE 600; 310; 30; 20 MG/100ML; MG/100ML; MG/100ML; MG/100ML
INJECTION, SOLUTION INTRAVENOUS CONTINUOUS
Status: DISPENSED | OUTPATIENT
Start: 2024-01-15 | End: 2024-01-16

## 2024-01-15 RX ADMIN — PANTOPRAZOLE SODIUM 40 MG: 40 TABLET, DELAYED RELEASE ORAL at 06:42

## 2024-01-15 RX ADMIN — ENOXAPARIN SODIUM 30 MG: 100 INJECTION SUBCUTANEOUS at 20:17

## 2024-01-15 RX ADMIN — OXYCODONE AND ACETAMINOPHEN 1 TABLET: 5; 325 TABLET ORAL at 02:32

## 2024-01-15 RX ADMIN — GABAPENTIN 800 MG: 400 CAPSULE ORAL at 08:41

## 2024-01-15 RX ADMIN — ONDANSETRON 4 MG: 2 INJECTION INTRAMUSCULAR; INTRAVENOUS at 17:10

## 2024-01-15 RX ADMIN — GABAPENTIN 800 MG: 400 CAPSULE ORAL at 20:17

## 2024-01-15 RX ADMIN — TIZANIDINE 4 MG: 4 TABLET ORAL at 20:17

## 2024-01-15 RX ADMIN — LEVETIRACETAM 1000 MG: 500 TABLET, FILM COATED ORAL at 20:17

## 2024-01-15 RX ADMIN — ENOXAPARIN SODIUM 30 MG: 100 INJECTION SUBCUTANEOUS at 08:41

## 2024-01-15 RX ADMIN — OXYCODONE AND ACETAMINOPHEN 1 TABLET: 5; 325 TABLET ORAL at 22:21

## 2024-01-15 RX ADMIN — OXYCODONE AND ACETAMINOPHEN 1 TABLET: 5; 325 TABLET ORAL at 17:32

## 2024-01-15 RX ADMIN — LEVETIRACETAM 1000 MG: 100 INJECTION, SOLUTION INTRAVENOUS at 08:40

## 2024-01-15 RX ADMIN — CLONIDINE HYDROCHLORIDE 0.1 MG: 0.1 TABLET ORAL at 20:17

## 2024-01-15 RX ADMIN — OXYCODONE AND ACETAMINOPHEN 1 TABLET: 5; 325 TABLET ORAL at 06:42

## 2024-01-15 RX ADMIN — Medication 10 MG: at 20:17

## 2024-01-15 RX ADMIN — HYDROMORPHONE HYDROCHLORIDE 0.5 MG: 1 INJECTION, SOLUTION INTRAMUSCULAR; INTRAVENOUS; SUBCUTANEOUS at 13:48

## 2024-01-15 RX ADMIN — ESTRADIOL 0.5 MG: 1 TABLET ORAL at 08:41

## 2024-01-15 RX ADMIN — SODIUM CHLORIDE, PRESERVATIVE FREE 10 ML: 5 INJECTION INTRAVENOUS at 08:43

## 2024-01-15 RX ADMIN — CLONIDINE HYDROCHLORIDE 0.1 MG: 0.1 TABLET ORAL at 08:41

## 2024-01-15 RX ADMIN — TIZANIDINE 4 MG: 4 TABLET ORAL at 09:49

## 2024-01-15 RX ADMIN — ONDANSETRON 4 MG: 2 INJECTION INTRAMUSCULAR; INTRAVENOUS at 09:49

## 2024-01-15 RX ADMIN — PROMETHAZINE HYDROCHLORIDE 12.5 MG: 25 INJECTION INTRAMUSCULAR; INTRAVENOUS at 07:46

## 2024-01-15 RX ADMIN — PROMETHAZINE HYDROCHLORIDE 12.5 MG: 25 INJECTION INTRAMUSCULAR; INTRAVENOUS at 19:38

## 2024-01-15 RX ADMIN — ONDANSETRON 4 MG: 2 INJECTION INTRAMUSCULAR; INTRAVENOUS at 02:31

## 2024-01-15 RX ADMIN — GABAPENTIN 800 MG: 400 CAPSULE ORAL at 13:48

## 2024-01-15 RX ADMIN — DULOXETINE HYDROCHLORIDE 60 MG: 30 CAPSULE, DELAYED RELEASE ORAL at 08:42

## 2024-01-15 RX ADMIN — PANTOPRAZOLE SODIUM 40 MG: 40 TABLET, DELAYED RELEASE ORAL at 17:32

## 2024-01-15 ASSESSMENT — PAIN DESCRIPTION - LOCATION
LOCATION: ABDOMEN;FLANK
LOCATION: ABDOMEN
LOCATION: ABDOMEN;FLANK
LOCATION: ABDOMEN
LOCATION: ABDOMEN
LOCATION: ABDOMEN;FLANK
LOCATION: ABDOMEN

## 2024-01-15 ASSESSMENT — PAIN SCALES - WONG BAKER
WONGBAKER_NUMERICALRESPONSE: 0
WONGBAKER_NUMERICALRESPONSE: 0

## 2024-01-15 ASSESSMENT — PAIN DESCRIPTION - ORIENTATION
ORIENTATION: RIGHT
ORIENTATION: RIGHT;MID
ORIENTATION: RIGHT;MID
ORIENTATION: MID
ORIENTATION: RIGHT;MID
ORIENTATION: MID
ORIENTATION: MID;RIGHT

## 2024-01-15 ASSESSMENT — PAIN SCALES - GENERAL
PAINLEVEL_OUTOF10: 6
PAINLEVEL_OUTOF10: 4
PAINLEVEL_OUTOF10: 8
PAINLEVEL_OUTOF10: 6
PAINLEVEL_OUTOF10: 7
PAINLEVEL_OUTOF10: 8
PAINLEVEL_OUTOF10: 6
PAINLEVEL_OUTOF10: 3
PAINLEVEL_OUTOF10: 7

## 2024-01-15 ASSESSMENT — PAIN DESCRIPTION - ONSET: ONSET: ON-GOING

## 2024-01-15 ASSESSMENT — PAIN DESCRIPTION - DESCRIPTORS
DESCRIPTORS: ACHING;CRAMPING;DISCOMFORT;DULL
DESCRIPTORS: ACHING;PRESSURE;SHARP
DESCRIPTORS: SQUEEZING;SHARP;PRESSURE
DESCRIPTORS: ACHING;SQUEEZING;SHARP
DESCRIPTORS: ACHING;DISCOMFORT;DULL
DESCRIPTORS: PRESSURE;SHARP;SQUEEZING

## 2024-01-15 ASSESSMENT — PAIN - FUNCTIONAL ASSESSMENT
PAIN_FUNCTIONAL_ASSESSMENT: ACTIVITIES ARE NOT PREVENTED

## 2024-01-16 LAB
ALBUMIN SERPL-MCNC: 3.4 GM/DL (ref 3.4–5)
ALP BLD-CCNC: 106 IU/L (ref 40–128)
ALT SERPL-CCNC: 13 U/L (ref 10–40)
ANION GAP SERPL CALCULATED.3IONS-SCNC: 7 MMOL/L (ref 7–16)
AST SERPL-CCNC: 15 IU/L (ref 15–37)
BASOPHILS ABSOLUTE: 0 K/CU MM
BASOPHILS RELATIVE PERCENT: 0.8 % (ref 0–1)
BILIRUB SERPL-MCNC: 0.2 MG/DL (ref 0–1)
BUN SERPL-MCNC: 8 MG/DL (ref 6–23)
CALCIUM SERPL-MCNC: 9.3 MG/DL (ref 8.3–10.6)
CHLORIDE BLD-SCNC: 108 MMOL/L (ref 99–110)
CO2: 27 MMOL/L (ref 21–32)
CREAT SERPL-MCNC: 0.6 MG/DL (ref 0.6–1.1)
DIFFERENTIAL TYPE: ABNORMAL
EOSINOPHILS ABSOLUTE: 0.2 K/CU MM
EOSINOPHILS RELATIVE PERCENT: 4.6 % (ref 0–3)
GFR SERPL CREATININE-BSD FRML MDRD: >60 ML/MIN/1.73M2
GLUCOSE SERPL-MCNC: 84 MG/DL (ref 70–99)
HCT VFR BLD CALC: 30.1 % (ref 37–47)
HEMOGLOBIN: 9.9 GM/DL (ref 12.5–16)
IMMATURE NEUTROPHIL %: 0 % (ref 0–0.43)
LYMPHOCYTES ABSOLUTE: 2.3 K/CU MM
LYMPHOCYTES RELATIVE PERCENT: 62.3 % (ref 24–44)
MAGNESIUM: 2.1 MG/DL (ref 1.8–2.4)
MCH RBC QN AUTO: 28.7 PG (ref 27–31)
MCHC RBC AUTO-ENTMCNC: 32.9 % (ref 32–36)
MCV RBC AUTO: 87.2 FL (ref 78–100)
MONOCYTES ABSOLUTE: 0.3 K/CU MM
MONOCYTES RELATIVE PERCENT: 7.7 % (ref 0–4)
NUCLEATED RBC %: 0 %
PDW BLD-RTO: 12.5 % (ref 11.7–14.9)
PLATELET # BLD: 157 K/CU MM (ref 140–440)
PMV BLD AUTO: 9.8 FL (ref 7.5–11.1)
POTASSIUM SERPL-SCNC: 4 MMOL/L (ref 3.5–5.1)
RBC # BLD: 3.45 M/CU MM (ref 4.2–5.4)
SEGMENTED NEUTROPHILS ABSOLUTE COUNT: 0.9 K/CU MM
SEGMENTED NEUTROPHILS RELATIVE PERCENT: 24.6 % (ref 36–66)
SODIUM BLD-SCNC: 142 MMOL/L (ref 135–145)
TOTAL IMMATURE NEUTOROPHIL: 0 K/CU MM
TOTAL NUCLEATED RBC: 0 K/CU MM
TOTAL PROTEIN: 5.2 GM/DL (ref 6.4–8.2)
WBC # BLD: 3.7 K/CU MM (ref 4–10.5)

## 2024-01-16 PROCEDURE — 85025 COMPLETE CBC W/AUTO DIFF WBC: CPT

## 2024-01-16 PROCEDURE — 1200000000 HC SEMI PRIVATE

## 2024-01-16 PROCEDURE — 6370000000 HC RX 637 (ALT 250 FOR IP): Performed by: STUDENT IN AN ORGANIZED HEALTH CARE EDUCATION/TRAINING PROGRAM

## 2024-01-16 PROCEDURE — 80053 COMPREHEN METABOLIC PANEL: CPT

## 2024-01-16 PROCEDURE — 6370000000 HC RX 637 (ALT 250 FOR IP): Performed by: PHYSICIAN ASSISTANT

## 2024-01-16 PROCEDURE — 6360000002 HC RX W HCPCS: Performed by: HOSPITALIST

## 2024-01-16 PROCEDURE — 6360000002 HC RX W HCPCS: Performed by: INTERNAL MEDICINE

## 2024-01-16 PROCEDURE — 83735 ASSAY OF MAGNESIUM: CPT

## 2024-01-16 PROCEDURE — 6360000002 HC RX W HCPCS: Performed by: STUDENT IN AN ORGANIZED HEALTH CARE EDUCATION/TRAINING PROGRAM

## 2024-01-16 PROCEDURE — 94761 N-INVAS EAR/PLS OXIMETRY MLT: CPT

## 2024-01-16 PROCEDURE — 2580000003 HC RX 258: Performed by: STUDENT IN AN ORGANIZED HEALTH CARE EDUCATION/TRAINING PROGRAM

## 2024-01-16 RX ORDER — CARBOXYMETHYLCELLULOSE SODIUM 10 MG/ML
2 GEL OPHTHALMIC 3 TIMES DAILY PRN
Status: DISCONTINUED | OUTPATIENT
Start: 2024-01-16 | End: 2024-01-17 | Stop reason: HOSPADM

## 2024-01-16 RX ORDER — POLYVINYL ALCOHOL 14 MG/ML
2 SOLUTION/ DROPS OPHTHALMIC PRN
Status: DISCONTINUED | OUTPATIENT
Start: 2024-01-16 | End: 2024-01-17 | Stop reason: HOSPADM

## 2024-01-16 RX ADMIN — ONDANSETRON 4 MG: 2 INJECTION INTRAMUSCULAR; INTRAVENOUS at 09:04

## 2024-01-16 RX ADMIN — ESTRADIOL 0.5 MG: 1 TABLET ORAL at 09:04

## 2024-01-16 RX ADMIN — GABAPENTIN 800 MG: 400 CAPSULE ORAL at 09:04

## 2024-01-16 RX ADMIN — SODIUM CHLORIDE, PRESERVATIVE FREE 10 ML: 5 INJECTION INTRAVENOUS at 20:46

## 2024-01-16 RX ADMIN — SODIUM CHLORIDE, PRESERVATIVE FREE 10 ML: 5 INJECTION INTRAVENOUS at 09:05

## 2024-01-16 RX ADMIN — ENOXAPARIN SODIUM 30 MG: 100 INJECTION SUBCUTANEOUS at 09:03

## 2024-01-16 RX ADMIN — Medication 10 MG: at 20:38

## 2024-01-16 RX ADMIN — HYDROMORPHONE HYDROCHLORIDE 0.5 MG: 1 INJECTION, SOLUTION INTRAMUSCULAR; INTRAVENOUS; SUBCUTANEOUS at 12:54

## 2024-01-16 RX ADMIN — LEVETIRACETAM 1000 MG: 500 TABLET, FILM COATED ORAL at 09:05

## 2024-01-16 RX ADMIN — SENNOSIDES 8.6 MG: 8.6 TABLET, FILM COATED ORAL at 09:04

## 2024-01-16 RX ADMIN — PANTOPRAZOLE SODIUM 40 MG: 40 TABLET, DELAYED RELEASE ORAL at 17:38

## 2024-01-16 RX ADMIN — PROMETHAZINE HYDROCHLORIDE 12.5 MG: 25 INJECTION INTRAMUSCULAR; INTRAVENOUS at 04:55

## 2024-01-16 RX ADMIN — HYDROMORPHONE HYDROCHLORIDE 0.5 MG: 1 INJECTION, SOLUTION INTRAMUSCULAR; INTRAVENOUS; SUBCUTANEOUS at 20:45

## 2024-01-16 RX ADMIN — PROMETHAZINE HYDROCHLORIDE 12.5 MG: 25 INJECTION INTRAMUSCULAR; INTRAVENOUS at 17:38

## 2024-01-16 RX ADMIN — TIZANIDINE 4 MG: 4 TABLET ORAL at 20:38

## 2024-01-16 RX ADMIN — OXYCODONE AND ACETAMINOPHEN 1 TABLET: 5; 325 TABLET ORAL at 04:54

## 2024-01-16 RX ADMIN — CLONIDINE HYDROCHLORIDE 0.1 MG: 0.1 TABLET ORAL at 09:05

## 2024-01-16 RX ADMIN — PANTOPRAZOLE SODIUM 40 MG: 40 TABLET, DELAYED RELEASE ORAL at 04:59

## 2024-01-16 RX ADMIN — CLONIDINE HYDROCHLORIDE 0.1 MG: 0.1 TABLET ORAL at 20:38

## 2024-01-16 RX ADMIN — LEVETIRACETAM 1000 MG: 500 TABLET, FILM COATED ORAL at 20:38

## 2024-01-16 RX ADMIN — TIZANIDINE 4 MG: 4 TABLET ORAL at 04:54

## 2024-01-16 RX ADMIN — GABAPENTIN 800 MG: 400 CAPSULE ORAL at 20:38

## 2024-01-16 RX ADMIN — DULOXETINE HYDROCHLORIDE 60 MG: 30 CAPSULE, DELAYED RELEASE ORAL at 09:05

## 2024-01-16 RX ADMIN — ENOXAPARIN SODIUM 30 MG: 100 INJECTION SUBCUTANEOUS at 20:38

## 2024-01-16 ASSESSMENT — ENCOUNTER SYMPTOMS
COLOR CHANGE: 0
SORE THROAT: 0
NAUSEA: 1
VOMITING: 1
BACK PAIN: 0
ANAL BLEEDING: 0
APNEA: 0
EYE ITCHING: 0
CHOKING: 0
STRIDOR: 0
PHOTOPHOBIA: 0
RECTAL PAIN: 0
ABDOMINAL PAIN: 1
CONSTIPATION: 0
EYE REDNESS: 0

## 2024-01-16 ASSESSMENT — PAIN SCALES - GENERAL
PAINLEVEL_OUTOF10: 7
PAINLEVEL_OUTOF10: 7
PAINLEVEL_OUTOF10: 3
PAINLEVEL_OUTOF10: 7
PAINLEVEL_OUTOF10: 2
PAINLEVEL_OUTOF10: 6

## 2024-01-16 ASSESSMENT — PAIN DESCRIPTION - LOCATION
LOCATION: ABDOMEN
LOCATION: ABDOMEN
LOCATION: FLANK

## 2024-01-16 ASSESSMENT — PAIN DESCRIPTION - ORIENTATION
ORIENTATION: MID;UPPER
ORIENTATION: RIGHT

## 2024-01-16 ASSESSMENT — PAIN DESCRIPTION - DESCRIPTORS
DESCRIPTORS: SQUEEZING;SHARP
DESCRIPTORS: SQUEEZING;STABBING;SHARP

## 2024-01-16 NOTE — CONSULTS
[address] CENTER [place] [id number]                                  CONSULTATION    PATIENT NAME: DANE ENGLAND                  :        1970  MED REC NO:   [id number]                          ROOM:       [facility]  ACCOUNT NO:   [id number]                           ADMIT DATE: 2024  PROVIDER:     Cortez Hanley MD    CONSULT DATE:  2024    The patient is in the observation unit.    CHIEF COMPLAINT:  History of intractable upper abdominal pain with  nausea and vomiting, etiology to be determined.    HISTORY OF PRESENT ILLNESS:  The patient is a 53-year-old female patient  known to me from her numerous previous hospitalizations, last seen in  consult on 07/10/2023 with past medical history significant for morbid  obesity, status post sleeve gastrectomy initially by Dr. Scott, which  was converted to Jet-en-Y gastric bypass with lysis of adhesions on  2021; history of lupus; hypertension; depression/anxiety;  gastroesophageal reflux disease; peptic ulcer disease; hypothyroidism;  history of gallstones, status post cholecystectomy, complicated by bile  duct injury? for which the patient had 7 or 8 ERCPs done in [place], [place] with placement of biliary/pancreatic stents which were  subsequently removed.  The patient also has history of  acute/recurrent/chronic pancreatitis; anemia; chronic back pain;  fibromyalgia; history of recurrent episodes of upper GI bleeding,  requiring multiple EGDs; seizure disorder; chronic pain syndrome;  history of DVT; and left knee septic arthritis.    The patient presented to the emergency room yesterday with 4-day history  of epigastric right upper quadrant abdominal pain radiating to the back  along with nausea and vomiting.  There is no history of fever, chills,  hematemesis, melena, or hematochezia.  According to the patient, she  started having loose BM today.  The blood 
CONSULT DICTATED #[id number]  
04:25 AM     CMP:    Lab Results   Component Value Date/Time     01/16/2024 04:25 AM    K 4.0 01/16/2024 04:25 AM     01/16/2024 04:25 AM    CO2 27 01/16/2024 04:25 AM    BUN 8 01/16/2024 04:25 AM    CREATININE 0.6 01/16/2024 04:25 AM    GFRAA >60 10/11/2022 06:00 PM    LABGLOM >60 01/16/2024 04:25 AM    GLUCOSE 84 01/16/2024 04:25 AM    PROT 5.2 01/16/2024 04:25 AM    PROT 6.5 11/18/2011 03:45 AM    LABALBU 3.4 01/16/2024 04:25 AM    LABALBU 8 11/18/2015 05:00 PM    CALCIUM 9.3 01/16/2024 04:25 AM    BILITOT 0.2 01/16/2024 04:25 AM    ALKPHOS 106 01/16/2024 04:25 AM    AST 15 01/16/2024 04:25 AM    ALT 13 01/16/2024 04:25 AM       IMPRESSION:        Patient Active Problem List:     Fever     Fibromyalgia     Chronic pancreatitis (HCC)     HTN (hypertension)     Normocytic anemia     Frequent UTI     Gastroesophageal reflux disease without esophagitis     Chronic depression     Fatty liver disease, nonalcoholic     Arthritis     Bilateral low back pain with left-sided sciatica     Hiatal hernia     Chronic pain syndrome     Drug-seeking/Aberrant behavior     History of septic arthritis of left knee     Lymph node disorder     Morbid obesity (HCC)     Iron deficiency anemia     History of Jet-en-Y gastric bypass     Anxiety     RUQ pain     Cellulitis of left thigh     Malabsorption     COPD (chronic obstructive pulmonary disease) (HCC)     Chronic nausea     Hypothyroidism due to acquired atrophy of thyroid     Vitamin D deficiency     Irritable bowel syndrome     Port-A-Cath in place     Psychogenic nonepileptic seizure     Diarrhea     Hemarthrosis of left knee     Acute pain of left knee     S/P arthroscopic surgery of left knee     Abdominal pain     Lumbar radiculopathy     Intractable nausea and vomiting     Rheumatoid arthritis involving multiple sites with positive rheumatoid factor (HCC)     Intestinal malabsorption     History of deep vein thrombosis (DVT) of lower extremity     Menopausal

## 2024-01-17 ENCOUNTER — APPOINTMENT (OUTPATIENT)
Dept: GENERAL RADIOLOGY | Age: 54
DRG: 392 | End: 2024-01-17
Payer: COMMERCIAL

## 2024-01-17 VITALS
HEART RATE: 67 BPM | WEIGHT: 248.46 LBS | DIASTOLIC BLOOD PRESSURE: 59 MMHG | SYSTOLIC BLOOD PRESSURE: 102 MMHG | OXYGEN SATURATION: 99 % | BODY MASS INDEX: 42.42 KG/M2 | TEMPERATURE: 97.4 F | RESPIRATION RATE: 16 BRPM | HEIGHT: 64 IN

## 2024-01-17 LAB
ASTROVIRUS PCR: NOT DETECTED
C CAYETANENSIS DNA SPEC QL NAA+PROBE: NOT DETECTED
CAMPY SP DNA.DIARRHEA STL QL NAA+PROBE: NOT DETECTED
CRYPTOSP DNA SPEC QL NAA+PROBE: NOT DETECTED
E COLI DNA SPEC QL NAA+PROBE: NOT DETECTED
E COLI ENTEROAGGREGATIVE PCR: NOT DETECTED
E COLI ENTEROPATHOGENIC PCR: NOT DETECTED
E COLI ENTEROTOXIGENIC PCR: NOT DETECTED
E COLI O157H7 DNA SPEC QL NAA+PROBE: NOT DETECTED
E HISTOLYT DNA SPEC QL NAA+PROBE: NOT DETECTED
EC STX1+STX2 + H7 FLIC SPEC NAA+PROBE: NOT DETECTED
G LAMBLIA DNA SPEC QL NAA+PROBE: NOT DETECTED
HADV DNA SPEC QL NAA+PROBE: NOT DETECTED
NOROVIRUS RNA SPEC QL NAA+PROBE: NOT DETECTED
P SHIGELLOIDES DNA STL QL NAA+PROBE: NOT DETECTED
RV RNA SPEC QL NAA+PROBE: NOT DETECTED
SALMONELLA DNA SPEC QL NAA+PROBE: NOT DETECTED
SAPOVIRUS PCR: NOT DETECTED
V CHOLERAE DNA SPEC QL NAA+PROBE: NOT DETECTED
VIBRIO DNA SPEC NAA+PROBE: NOT DETECTED
YERSINIA DNA SPEC NAA+PROBE: NOT DETECTED

## 2024-01-17 PROCEDURE — 6360000002 HC RX W HCPCS: Performed by: STUDENT IN AN ORGANIZED HEALTH CARE EDUCATION/TRAINING PROGRAM

## 2024-01-17 PROCEDURE — 6370000000 HC RX 637 (ALT 250 FOR IP): Performed by: INTERNAL MEDICINE

## 2024-01-17 PROCEDURE — 6360000002 HC RX W HCPCS: Performed by: HOSPITALIST

## 2024-01-17 PROCEDURE — 74240 X-RAY XM UPR GI TRC 1CNTRST: CPT

## 2024-01-17 PROCEDURE — 87507 IADNA-DNA/RNA PROBE TQ 12-25: CPT

## 2024-01-17 PROCEDURE — 6360000002 HC RX W HCPCS: Performed by: INTERNAL MEDICINE

## 2024-01-17 PROCEDURE — 94761 N-INVAS EAR/PLS OXIMETRY MLT: CPT

## 2024-01-17 PROCEDURE — 6370000000 HC RX 637 (ALT 250 FOR IP): Performed by: PHYSICIAN ASSISTANT

## 2024-01-17 PROCEDURE — 6370000000 HC RX 637 (ALT 250 FOR IP): Performed by: STUDENT IN AN ORGANIZED HEALTH CARE EDUCATION/TRAINING PROGRAM

## 2024-01-17 PROCEDURE — 2580000003 HC RX 258: Performed by: STUDENT IN AN ORGANIZED HEALTH CARE EDUCATION/TRAINING PROGRAM

## 2024-01-17 RX ORDER — HEPARIN 100 UNIT/ML
500 SYRINGE INTRAVENOUS PRN
Status: DISCONTINUED | OUTPATIENT
Start: 2024-01-17 | End: 2024-01-17 | Stop reason: HOSPADM

## 2024-01-17 RX ADMIN — DULOXETINE HYDROCHLORIDE 60 MG: 30 CAPSULE, DELAYED RELEASE ORAL at 14:55

## 2024-01-17 RX ADMIN — OXYCODONE AND ACETAMINOPHEN 1 TABLET: 5; 325 TABLET ORAL at 00:28

## 2024-01-17 RX ADMIN — CARBOXYMETHYLCELLULOSE SODIUM 2 DROP: 10 GEL OPHTHALMIC at 00:34

## 2024-01-17 RX ADMIN — PANTOPRAZOLE SODIUM 40 MG: 40 TABLET, DELAYED RELEASE ORAL at 06:56

## 2024-01-17 RX ADMIN — OXYCODONE AND ACETAMINOPHEN 1 TABLET: 5; 325 TABLET ORAL at 16:40

## 2024-01-17 RX ADMIN — GABAPENTIN 800 MG: 400 CAPSULE ORAL at 14:55

## 2024-01-17 RX ADMIN — ONDANSETRON 4 MG: 4 TABLET, ORALLY DISINTEGRATING ORAL at 16:40

## 2024-01-17 RX ADMIN — SODIUM CHLORIDE, PRESERVATIVE FREE 10 ML: 5 INJECTION INTRAVENOUS at 14:55

## 2024-01-17 RX ADMIN — CARBOXYMETHYLCELLULOSE SODIUM 2 DROP: 10 GEL OPHTHALMIC at 15:05

## 2024-01-17 RX ADMIN — PROMETHAZINE HYDROCHLORIDE 12.5 MG: 25 INJECTION INTRAMUSCULAR; INTRAVENOUS at 10:50

## 2024-01-17 RX ADMIN — HEPARIN 500 UNITS: 100 SYRINGE at 18:01

## 2024-01-17 RX ADMIN — SENNOSIDES 8.6 MG: 8.6 TABLET, FILM COATED ORAL at 14:55

## 2024-01-17 RX ADMIN — LEVETIRACETAM 1000 MG: 500 TABLET, FILM COATED ORAL at 14:55

## 2024-01-17 RX ADMIN — HYDROMORPHONE HYDROCHLORIDE 0.5 MG: 1 INJECTION, SOLUTION INTRAMUSCULAR; INTRAVENOUS; SUBCUTANEOUS at 11:00

## 2024-01-17 RX ADMIN — POLYETHYLENE GLYCOL (3350) 17 G: 17 POWDER, FOR SOLUTION ORAL at 14:54

## 2024-01-17 RX ADMIN — PANTOPRAZOLE SODIUM 40 MG: 40 TABLET, DELAYED RELEASE ORAL at 14:55

## 2024-01-17 RX ADMIN — HYDROMORPHONE HYDROCHLORIDE 0.5 MG: 1 INJECTION, SOLUTION INTRAMUSCULAR; INTRAVENOUS; SUBCUTANEOUS at 04:39

## 2024-01-17 RX ADMIN — ONDANSETRON 4 MG: 2 INJECTION INTRAMUSCULAR; INTRAVENOUS at 09:26

## 2024-01-17 RX ADMIN — ESTRADIOL 0.5 MG: 1 TABLET ORAL at 15:05

## 2024-01-17 RX ADMIN — ENOXAPARIN SODIUM 30 MG: 100 INJECTION SUBCUTANEOUS at 14:57

## 2024-01-17 ASSESSMENT — PAIN DESCRIPTION - ORIENTATION
ORIENTATION: LOWER;MID
ORIENTATION: LOWER;MID
ORIENTATION: MID;LOWER
ORIENTATION: MID;UPPER;RIGHT

## 2024-01-17 ASSESSMENT — PAIN DESCRIPTION - ONSET
ONSET: ON-GOING
ONSET: ON-GOING

## 2024-01-17 ASSESSMENT — PAIN SCALES - GENERAL
PAINLEVEL_OUTOF10: 1
PAINLEVEL_OUTOF10: 7
PAINLEVEL_OUTOF10: 7
PAINLEVEL_OUTOF10: 3
PAINLEVEL_OUTOF10: 6
PAINLEVEL_OUTOF10: 3
PAINLEVEL_OUTOF10: 3
PAINLEVEL_OUTOF10: 7
PAINLEVEL_OUTOF10: 1
PAINLEVEL_OUTOF10: 3

## 2024-01-17 ASSESSMENT — ENCOUNTER SYMPTOMS
STRIDOR: 0
CHOKING: 0
RECTAL PAIN: 0
CONSTIPATION: 0
VOMITING: 1
BACK PAIN: 0
APNEA: 0
SORE THROAT: 0
EYE ITCHING: 0
PHOTOPHOBIA: 0
NAUSEA: 1
ANAL BLEEDING: 0
COLOR CHANGE: 0
EYE REDNESS: 0
ABDOMINAL PAIN: 1

## 2024-01-17 ASSESSMENT — PAIN DESCRIPTION - LOCATION
LOCATION: BACK;FLANK
LOCATION: BACK;FLANK
LOCATION: ABDOMEN

## 2024-01-17 ASSESSMENT — PAIN - FUNCTIONAL ASSESSMENT
PAIN_FUNCTIONAL_ASSESSMENT: ACTIVITIES ARE NOT PREVENTED

## 2024-01-17 ASSESSMENT — PAIN DESCRIPTION - FREQUENCY
FREQUENCY: INTERMITTENT
FREQUENCY: INTERMITTENT

## 2024-01-17 ASSESSMENT — PAIN DESCRIPTION - DESCRIPTORS
DESCRIPTORS: SQUEEZING;STABBING;SHARP
DESCRIPTORS: ACHING;SHARP
DESCRIPTORS: ACHING;SHARP
DESCRIPTORS: ACHING
DESCRIPTORS: ACHING

## 2024-01-17 ASSESSMENT — PAIN DESCRIPTION - PAIN TYPE: TYPE: CHRONIC PAIN

## 2024-01-17 NOTE — PLAN OF CARE
Problem: Discharge Planning  Goal: Discharge to home or other facility with appropriate resources  Outcome: Completed  Flowsheets (Taken 1/17/2024 0901)  Discharge to home or other facility with appropriate resources:   Identify barriers to discharge with patient and caregiver   Arrange for needed discharge resources and transportation as appropriate   Identify discharge learning needs (meds, wound care, etc)   Refer to discharge planning if patient needs post-hospital services based on physician order or complex needs related to functional status, cognitive ability or social support system     Problem: Safety - Adult  Goal: Free from fall injury  Outcome: Completed  Flowsheets (Taken 1/17/2024 0725)  Free From Fall Injury:   Instruct family/caregiver on patient safety   Based on caregiver fall risk screen, instruct family/caregiver to ask for assistance with transferring infant if caregiver noted to have fall risk factors     Problem: Pain  Goal: Verbalizes/displays adequate comfort level or baseline comfort level  Outcome: Completed  Flowsheets  Taken 1/17/2024 1459  Verbalizes/displays adequate comfort level or baseline comfort level:   Encourage patient to monitor pain and request assistance   Assess pain using appropriate pain scale   Administer analgesics based on type and severity of pain and evaluate response  Taken 1/17/2024 0715  Verbalizes/displays adequate comfort level or baseline comfort level:   Encourage patient to monitor pain and request assistance   Assess pain using appropriate pain scale   Administer analgesics based on type and severity of pain and evaluate response     Problem: ABCDS Injury Assessment  Goal: Absence of physical injury  Outcome: Completed  Flowsheets (Taken 1/17/2024 0725)  Absence of Physical Injury: Implement safety measures based on patient assessment

## 2024-01-17 NOTE — DISCHARGE SUMMARY
V2.0  Discharge Summary    Name:  Andre Pope /Age/Sex: 1970 (53 y.o. female)   Admit Date: 2024  Discharge Date: 24    MRN & CSN:  [id number] & [id number] Encounter Date and Time 24 4:39 PM EST    Attending:  Galo Calvo MD Discharging Provider: MICHAEL Pierson - CNP       Hospital Course:     Brief HPI: Andre Pope is a 53 y.o. female who presented with intractable nausea vomiting and abdominal pain    Brief Problem Based Course:     Intractable abdominal pain  N/V  - has frequent admissions for abdominal pain, history of multiple abdominal surgeries   -Lipase 15, LFTs WNL  - urine culture negative   -CT abdomen and pelvis reviewed, stable pneumobilia status postcholecystectomy.  Sequelae of chronic pancreatitis.  Nonobstructing bilateral renal stones.  -Pain control and antiemetic as needed.  -IV LR completed  --Tolerating soft bland diet  -No emesis, or loose stools as discussed with nursing staff.. No concern for C diff at this time due reporting some formed stools   - ?gastroparesis vs. Gastroenteritis, could have pain from adhesions due history of abdominal surgeries  - GI PCR negative  - continue home Percocet, antiemetics PRN.   - GI following, notes no need for EGD at this point, advance diet as tolerated, continue present management with IV Protonix.  Recommend outpatient colonoscopy  - consult general surgery due to persistent symptoms and history of gastric bypass,   -- Okay for discharge per general surgery s/p unremarkable UGI with small bowel follow study     Hypertension  -Continue clonidine with hold parameters     History of seizures  - continue home Keppra     History of chronic pancreatitis   - may need to consider starting Creon? Will defer to GI      History of Jet-en-Y gastric bypass     Chronic pain with opioid dependence  -Continue home Percocet (OARRs reviewed on admission), Zanaflex, gabapentin, Cymbalta       Morbid obesity   -

## 2024-01-17 NOTE — PROGRESS NOTES
V2.0  [facility] Hospitalist Progress Note      Name:  Andre Pope /Age/Sex: 1970  (53 y.o. female)   MRN & CSN:  [id number] & [id number] Encounter Date/Time: 1/15/2024 11:08 AM EST    Location:  OBS  PCP: Cholo Royal MD       Hospital Day: 3    Assessment and Plan:   Andre Pope is a 53 y.o. female with hypertension, hypothyroidism, RA, GERD with erosive gastritis, chronic pancreatitis, chronic pain syndrome, depression, morbid obesity status post Jet-en-Y gastric bypass, unspecified seizure disorder presented from home with abdominal pain, nausea and vomiting.     Intractable abdominal pain  N/V  - has frequent admissions for abdominal pain, history of multiple abdominal surgeries   -Lipase 15, LFTs WNL  - urine culture negative   -CT abdomen and pelvis reviewed, stable pneumobilia status postcholecystectomy.  Sequelae of chronic pancreatitis.  Nonobstructing bilateral renal stones.  -Pain control and antiemetic as needed. Stop IV narcotics, restarted home Percocet  -IV LR infusion. Clear liquid diet and advance as tolerated  - having persistent N/V and frequent, loose stools. No concern for C diff at this time due reporting some formed stools   - ?gastroparesis vs. Gastroenteritis, could have pain from adhesions due history of abdominal surgeries  - check GI PCR  - continue home Percocet, IV antiemetics PRN. Limiting IV narcotics   - GI following, appreciate recs  - consult general surgery due to persistent symptoms and history of gastric bypass  -admit to inpatient    Hypertension  -BP soft  -Continue clonidine with hold parameters    History of seizures  - continue home Keppra    History of chronic pancreatitis   - may need to consider starting Creon? Will defer to GI     History of Jet-en-Y gastric bypass    Chronic pain with opioid dependence  -Continue home Percocet (OARRs reviewed), Zanaflex, gabapentin, Cymbalta  -Limit IV narcotics as able    Morbid obesity   - admit 
    V2.0  [facility] [place]      Name:  Andre Pope /Age/Sex: 1970  (53 y.o. female)   MRN & CSN:  [id number] & [id number] Encounter Date/Time: 2024 12:23 PM EST    Location:  OBS  PCP: Cholo Royal MD       Hospital Day: 4    Assessment and Plan:   Andre Pope is a 53 y.o. female with pmh  As noted below who presents with Intractable abdominal pain      Plan:  Intractable abdominal pain  N/V  - has frequent admissions for abdominal pain, history of multiple abdominal surgeries   -Lipase 15, LFTs WNL  - urine culture negative   -CT abdomen and pelvis reviewed, stable pneumobilia status postcholecystectomy.  Sequelae of chronic pancreatitis.  Nonobstructing bilateral renal stones.  -Pain control and antiemetic as needed.  -IV LR completed  . Advance diet as tolerated  - having persistent N/V and frequent, loose stools. No concern for C diff at this time due reporting some formed stools   - ?gastroparesis vs. Gastroenteritis, could have pain from adhesions due history of abdominal surgeries  - check GI PCR  - continue home Percocet, antiemetics PRN.   - GI following, notes no need for EGD at this point, advance diet as tolerated, continue present management with IV Protonix.  Recommend outpatient colonoscopy  - consult general surgery due to persistent symptoms and history of gastric bypass, general surgery ordered upper GI small bowel study.     Hypertension  -Continue clonidine with hold parameters     History of seizures  - continue home Keppra     History of chronic pancreatitis   - may need to consider starting Creon? Will defer to GI      History of Jet-en-Y gastric bypass     Chronic pain with opioid dependence  -Continue home Percocet (OARRs reviewed on admission), Zanaflex, gabapentin, Cymbalta  -Limit IV narcotics as able     Morbid obesity   - admit Body mass index is 40.35 kg/m².  - recommend lifestyle and dietary modifications as able    Diet ADULT 
4 Eyes Skin Assessment     NAME:  Andre Pope  YOB: 1970  MEDICAL RECORD NUMBER:  [id number]    The patient is being assessed for  Admission    I agree that at least one RN has performed a thorough Head to Toe Skin Assessment on the patient. ALL assessment sites listed below have been assessed.      Areas assessed by both nurses:    Head, Face, Ears, Shoulders, Back, Chest, Arms, Elbows, Hands, Sacrum. Buttock, Coccyx, Ischium, and Legs. Feet and Heels        Does the Patient have a Wound? Yes wound(s) were present on assessment. LDA wound assessment was Initiated and completed by RN       Colin Prevention initiated by RN: No  Wound Care Orders initiated by RN: No    Pressure Injury (Stage 3,4, Unstageable, DTI, NWPT, and Complex wounds) if present, place Wound referral order by RN under : No    New Ostomies, if present place, Ostomy referral order under : No     Nurse 1 eSignature: Electronically signed by Mitchel Harrell RN on 1/14/24 at 4:17 AM EST    **SHARE this note so that the co-signing nurse can place an eSignature**    Nurse 2 eSignature: Electronically signed by Oly Smith RN on 1/14/24 at 9:39 AM EST   
Dr Royal called and inquired on the Upper Gi small bowel study, Rn notified that there is no Radiologist on site to do the study today. The study will be done on 1/17/2024. TRES  
General Surgery-Dr Mendiola    Hospital Day: 5    ChiefComplaint on Admission: Abdominal pain with nausea and vomiting      Subjective:     Andre Pope is a 53 y.o. female with abdominal pain with nausea and vomiting.  Patient is seems to be doing better. Patient denies abdominal pain. Tolerating No diet orders on file. - BM.     ROS:  Review of Systems   Constitutional:  Negative for chills and fever.   HENT:  Negative for ear pain, mouth sores, sore throat and tinnitus.    Eyes:  Negative for photophobia, redness and itching.   Respiratory:  Negative for apnea, choking and stridor.    Cardiovascular:  Negative for chest pain and palpitations.   Gastrointestinal:  Positive for abdominal pain, nausea and vomiting. Negative for anal bleeding, constipation and rectal pain.   Endocrine: Negative for polydipsia.   Genitourinary:  Negative for enuresis, flank pain and hematuria.   Musculoskeletal:  Negative for back pain, joint swelling and myalgias.   Skin:  Negative for color change and pallor.   Allergic/Immunologic: Negative for environmental allergies.   Neurological:  Negative for syncope and speech difficulty.   Psychiatric/Behavioral:  Negative for confusion and hallucinations.        Allergies  Aspirin, Compazine [prochlorperazine], Shellfish-derived products, Toradol [ketorolac tromethamine], Haldol [haloperidol], Asa [aspirin], Compazine [prochlorperazine], Haldol [haloperidol], Reglan [metoclopramide], Reglan [metoclopramide], and Toradol [ketorolac tromethamine]          Diagnosis Date    Acid reflux     Anemia     Anxiety     Arthritis     Hands, Back And Ankles    Arthritis     Bleeding ulcer 2014    \"I Had Ulcers In My Stomach And Colon\"/ per pt on 8/12/2019\"they said recently having some blood in my stomach- in July ( 2019)could not find where coming from \"    Bronchitis Last Episode 2014    Chronic back pain     Chronic pain     [facility]    COPD (chronic obstructive pulmonary 
Patient discharged to home with mother to transport. Patient educated on all discharge instructions. All questions answered. Patient denies any needs at this time.     
Patient left floor for procedure.  
Patient returned to room from procedure.  
in place     Psychogenic nonepileptic seizure     Diarrhea     Hemarthrosis of left knee     Acute pain of left knee     S/P arthroscopic surgery of left knee     Abdominal pain     Lumbar radiculopathy     Intractable nausea and vomiting     Rheumatoid arthritis involving multiple sites with positive rheumatoid factor (HCC)     Intestinal malabsorption     History of deep vein thrombosis (DVT) of lower extremity     Menopausal vasomotor syndrome     Osteoarthritis of left knee     Intractable abdominal pain      Plan:       Will start diet and if isabelle ok for d/c      LONNY KIM MD    
post cholecystectomy.  1-2 mm foci of mineralization about the uncinate process of the pancreas, 1 of which appears to be new.  This is favored to be outside of the common bile duct and may reflect sequelae of prior pancreatitis; however, correlation with liver function test is recommended. Nonobstructing renal stones without evidence of hydronephrosis seen.  A Status post gastric bypass surgery without evidence of bowel obstruction seen. Other findings as above.       Electronically signed by Alondra Sheikh PA-C on 1/14/2024 at 2:48 PM

## 2024-01-18 ENCOUNTER — TELEPHONE (OUTPATIENT)
Dept: RHEUMATOLOGY | Age: 54
End: 2024-01-18

## 2024-01-18 NOTE — TELEPHONE ENCOUNTER
The pt called in today stating that she was recently in the hospital and missed her recent appointment. The pt is requesting an appointment by the end of January as she states that she will be leaving town again at the first of the month and is in need of a medication refill. I notified the pt of our limited availability for the next few months and we do not currently have availability to accommodate her request. The pt then stated she also will no longer have insurance after 1/31/24 and asked if there was availability again. I notified the pt again that we were not able to accommodate at her this time, but I scheduled the pt for the closest availability of 2/13/24 and added her to the wait list. The pt states that she is potentially moving out of state with family and would like recommendations for rheumatologist in her area. The pt then inquired about where she needed to go complete her Plaquenil eye exam, so I provided her the contact information for [facility]. I checked the pts referrals and notified her that the provider would need to re-order her referral due to it being sent on 5/16/23 and the pt never scheduled with them. The pt was very addiment about having her Humira refilled prior to being seen by Dr. Remy. I notified the pt that Dr. Remy will not refill any medication until she physically sees the pt. The pt has cancelled or no showed our clinic multiple times since establishing with us, so her last completed visit with the provider was on 7/18/23. The pt inquired when she could expect a response from the provider pertaining to her medication refill, and I notified the pt that we always expect a response from the provider within 24-48 hours after the time of call. The pt expressed verbal understanding of the information provided. Dr. Remy, please advise.

## 2024-01-23 NOTE — TELEPHONE ENCOUNTER
I have viewed our schedule and based on our availability, we can not currently accommodate to the pt. The pt has been added to the wait list and we will reach out if we have availability

## 2024-01-25 ENCOUNTER — TELEPHONE (OUTPATIENT)
Dept: RHEUMATOLOGY | Age: 54
End: 2024-01-25

## 2024-01-25 NOTE — PROGRESS NOTES
Patient Name: Andre Pope  Patient : 1970  Patient MRN: [id number]     Primary Oncologist: Josie Pitts MD  Referring Provider: Cholo Royal MD    Date of Service: 2024      Chief Complaint:   Chief Complaint   Patient presents with    Follow-up    Results     FU visit.     Problem List:      Fibromyalgia     Lupus (systemic lupus erythematosus) (     Chronic pancreatitis      HTN (hypertension)     Gastroesophageal reflux disease without esophagitis     Depression     Fatty liver disease, nonalcoholic     Arthritis     Bilateral low back pain with left-sided sciatica     Hiatal hernia     Status post laparoscopic sleeve gastrectomy     Pseudoseizure     Chronic pain syndrome     Drug-seeking/Aberrant behavior     Lymph node disorder     Morbid obesity with BMI of 40.0-44.9, adult (HCC)     Iron deficiency anemia secondary to blood loss (chronic)     Encounter for weight management     Anxiety     Status post bariatric surgery     Morbid obesity with BMI of 45.0-49.9, adult (HCC)     Discoloration of skin of flank resembling ecchymosis     Morbid obesity with BMI of 50.0-59.9, adult (HCC)    HPI:   Rajendra is a pleasant 53 y.o.  female patient with significant past medical history of gastric ulcer/AVM who presented to ER in 2018  with abdominal pain. On her admission she had normal CBC and Hg dropped during her stay. She had multiple EGD's in the past. Colonoscopy was several years ago. EGD X2 on this admission showed erosive gastritis with bright blood in the lumen. She received 4 units PRBC. She has underlying SLE/connective tissue disorder. She was followed by surgeon for potential bariatric surgery. She had h/o of MRSA 2 years ago.    CT abdomen in 2018:  1. No acute intra-abdominal abnormality to account for the patient's symptoms. 2. Status post cholecystectomy with pneumobilia, likely related to prior sphincterotomy. 3. Status post hysterectomy and appendectomy.  She

## 2024-01-25 NOTE — TELEPHONE ENCOUNTER
Pt called asking when her appointment was. Pt was advised. Pt requested a sooner date stating she is supposed to leave the states on the 1st and her appointment is on the 13th. The pt was advised we don't have any openings before the 13th at this time but we will call if something opens up to offer her. Pt verbalized understanding. Pt requested that we call in her medication before her visit. Pt was advised that we are holing off on refills until she is evaluated by Dr. Remy. Pt verbalized understanding. Andre then said \"Not that I'm going to do this but if I go to the hospital before my appointment can she come see me there?\" Pt was advised that Dr. Remy doesn't make rounds at the hospital and would not be able to do that. Pt verbalized understanding.

## 2024-01-29 ENCOUNTER — OFFICE VISIT (OUTPATIENT)
Dept: ONCOLOGY | Age: 54
End: 2024-01-29
Payer: COMMERCIAL

## 2024-01-29 ENCOUNTER — HOSPITAL ENCOUNTER (OUTPATIENT)
Dept: INFUSION THERAPY | Age: 54
Discharge: HOME | End: 2024-01-29
Payer: COMMERCIAL

## 2024-01-29 VITALS
SYSTOLIC BLOOD PRESSURE: 149 MMHG | WEIGHT: 242 LBS | HEIGHT: 64 IN | HEART RATE: 62 BPM | TEMPERATURE: 97.7 F | DIASTOLIC BLOOD PRESSURE: 65 MMHG | BODY MASS INDEX: 41.32 KG/M2 | OXYGEN SATURATION: 100 %

## 2024-01-29 DIAGNOSIS — R92.8 ABNORMAL MAMMOGRAM: ICD-10-CM

## 2024-01-29 DIAGNOSIS — D64.9 ANEMIA, UNSPECIFIED TYPE: Primary | ICD-10-CM

## 2024-01-29 DIAGNOSIS — D64.9 ANEMIA, UNSPECIFIED TYPE: ICD-10-CM

## 2024-01-29 LAB
BASOPHILS ABSOLUTE: 0 K/CU MM
BASOPHILS RELATIVE PERCENT: 0.6 % (ref 0–1)
DIFFERENTIAL TYPE: ABNORMAL
EOSINOPHILS ABSOLUTE: 0.3 K/CU MM
EOSINOPHILS RELATIVE PERCENT: 5.4 % (ref 0–3)
FERRITIN: 60 NG/ML (ref 15–150)
FOLATE SERPL-MCNC: 10.8 NG/ML (ref 3.1–17.5)
HCT VFR BLD CALC: 39 % (ref 37–47)
HEMOGLOBIN: 12.3 GM/DL (ref 12.5–16)
IRON: 80 UG/DL (ref 37–145)
LYMPHOCYTES ABSOLUTE: 2.5 K/CU MM
LYMPHOCYTES RELATIVE PERCENT: 46.4 % (ref 24–44)
MCH RBC QN AUTO: 28.2 PG (ref 27–31)
MCHC RBC AUTO-ENTMCNC: 31.5 % (ref 32–36)
MCV RBC AUTO: 89.4 FL (ref 78–100)
MONOCYTES ABSOLUTE: 0.5 K/CU MM
MONOCYTES RELATIVE PERCENT: 8.9 % (ref 0–4)
PCT TRANSFERRIN: 27 % (ref 10–44)
PDW BLD-RTO: 13.2 % (ref 11.7–14.9)
PLATELET # BLD: 156 K/CU MM (ref 140–440)
PMV BLD AUTO: 11.5 FL (ref 7.5–11.1)
RBC # BLD: 4.36 M/CU MM (ref 4.2–5.4)
SEGMENTED NEUTROPHILS ABSOLUTE COUNT: 2.1 K/CU MM
SEGMENTED NEUTROPHILS RELATIVE PERCENT: 38.7 % (ref 36–66)
T4 FREE SERPL-MCNC: 1.16 NG/DL (ref 0.9–1.8)
TOTAL IRON BINDING CAPACITY: 298 UG/DL (ref 250–450)
TSH SERPL DL<=0.005 MIU/L-ACNC: 5.79 UIU/ML (ref 0.27–4.2)
UNSATURATED IRON BINDING CAPACITY: 218 UG/DL (ref 110–370)
VITAMIN B-12: 360.4 PG/ML (ref 211–911)
WBC # BLD: 5.4 K/CU MM (ref 4–10.5)

## 2024-01-29 PROCEDURE — 36415 COLL VENOUS BLD VENIPUNCTURE: CPT

## 2024-01-29 PROCEDURE — 1036F TOBACCO NON-USER: CPT | Performed by: INTERNAL MEDICINE

## 2024-01-29 PROCEDURE — 83540 ASSAY OF IRON: CPT

## 2024-01-29 PROCEDURE — 82607 VITAMIN B-12: CPT

## 2024-01-29 PROCEDURE — 99213 OFFICE O/P EST LOW 20 MIN: CPT | Performed by: INTERNAL MEDICINE

## 2024-01-29 PROCEDURE — 83550 IRON BINDING TEST: CPT

## 2024-01-29 PROCEDURE — 84439 ASSAY OF FREE THYROXINE: CPT

## 2024-01-29 PROCEDURE — 3077F SYST BP >= 140 MM HG: CPT | Performed by: INTERNAL MEDICINE

## 2024-01-29 PROCEDURE — 99211 OFF/OP EST MAY X REQ PHY/QHP: CPT

## 2024-01-29 PROCEDURE — 85025 COMPLETE CBC W/AUTO DIFF WBC: CPT

## 2024-01-29 PROCEDURE — 82728 ASSAY OF FERRITIN: CPT

## 2024-01-29 PROCEDURE — G8417 CALC BMI ABV UP PARAM F/U: HCPCS | Performed by: INTERNAL MEDICINE

## 2024-01-29 PROCEDURE — G8482 FLU IMMUNIZE ORDER/ADMIN: HCPCS | Performed by: INTERNAL MEDICINE

## 2024-01-29 PROCEDURE — 1111F DSCHRG MED/CURRENT MED MERGE: CPT | Performed by: INTERNAL MEDICINE

## 2024-01-29 PROCEDURE — 3078F DIAST BP <80 MM HG: CPT | Performed by: INTERNAL MEDICINE

## 2024-01-29 PROCEDURE — 82746 ASSAY OF FOLIC ACID SERUM: CPT

## 2024-01-29 PROCEDURE — 3017F COLORECTAL CA SCREEN DOC REV: CPT | Performed by: INTERNAL MEDICINE

## 2024-01-29 PROCEDURE — 84443 ASSAY THYROID STIM HORMONE: CPT

## 2024-01-29 PROCEDURE — G8427 DOCREV CUR MEDS BY ELIG CLIN: HCPCS | Performed by: INTERNAL MEDICINE

## 2024-01-29 NOTE — PROGRESS NOTES
MA Rooming Questions  Patient: Andre Pope  MRN: [id number]    Date: 1/29/2024        1. Do you have any new issues?   yes - REPEAT BREAST US ?        2. Do you need any refills on medications?    no    3. Have you had any imaging done since your last visit?   yes - CT 1/13 BREAST US 12/4    4. Have you been hospitalized or seen in the emergency room since your last visit here?   yes - [facility]     5. Did the patient have a depression screening completed today? No    No data recorded     PHQ-9 Given to (if applicable):               PHQ-9 Score (if applicable):                     [] Positive     []  Negative              Does question #9 need addressed (if applicable)                     [] Yes    []  No               Ambreen Covarrubias CMA

## 2024-01-30 ENCOUNTER — TELEPHONE (OUTPATIENT)
Dept: ONCOLOGY | Age: 54
End: 2024-01-30

## 2024-01-30 DIAGNOSIS — M05.79 RHEUMATOID ARTHRITIS INVOLVING MULTIPLE SITES WITH POSITIVE RHEUMATOID FACTOR (HCC): ICD-10-CM

## 2024-01-30 RX ORDER — ADALIMUMAB 40MG/0.4ML
KIT SUBCUTANEOUS
Refills: 0 | OUTPATIENT
Start: 2024-01-30

## 2024-01-31 DIAGNOSIS — F32.A CHRONIC DEPRESSION: ICD-10-CM

## 2024-01-31 DIAGNOSIS — N95.1 MENOPAUSAL VASOMOTOR SYNDROME: ICD-10-CM

## 2024-01-31 DIAGNOSIS — R11.0 CHRONIC NAUSEA: ICD-10-CM

## 2024-01-31 DIAGNOSIS — E03.4 HYPOTHYROIDISM DUE TO ACQUIRED ATROPHY OF THYROID: ICD-10-CM

## 2024-01-31 DIAGNOSIS — F41.9 ANXIETY: ICD-10-CM

## 2024-01-31 RX ORDER — ESTRADIOL 0.5 MG/1
0.5 TABLET ORAL DAILY
Qty: 30 TABLET | Refills: 3 | Status: SHIPPED | OUTPATIENT
Start: 2024-01-31

## 2024-01-31 RX ORDER — LEVOTHYROXINE SODIUM 0.15 MG/1
150 TABLET ORAL DAILY
Qty: 30 TABLET | Refills: 3 | Status: SHIPPED | OUTPATIENT
Start: 2024-01-31

## 2024-01-31 RX ORDER — DULOXETIN HYDROCHLORIDE 30 MG/1
CAPSULE, DELAYED RELEASE ORAL
Qty: 30 CAPSULE | Refills: 3 | Status: SHIPPED | OUTPATIENT
Start: 2024-01-31

## 2024-01-31 RX ORDER — HYDROXYZINE 50 MG/1
TABLET, FILM COATED ORAL
Qty: 30 TABLET | Refills: 3 | Status: SHIPPED | OUTPATIENT
Start: 2024-01-31

## 2024-01-31 RX ORDER — SCOLOPAMINE TRANSDERMAL SYSTEM 1 MG/1
PATCH, EXTENDED RELEASE TRANSDERMAL
Qty: 10 PATCH | Refills: 1 | Status: SHIPPED | OUTPATIENT
Start: 2024-01-31

## 2024-01-31 NOTE — TELEPHONE ENCOUNTER
This nurse called the patient @ [phone] to review lab results. Patient was notified of TSH being elevated but her T4 is normal, he would like her to continue taking 150 mcg daily. Patient verbalized understanding and denies further needs at this time.

## 2024-02-02 DIAGNOSIS — K58.9 IRRITABLE BOWEL SYNDROME, UNSPECIFIED TYPE: ICD-10-CM

## 2024-02-02 DIAGNOSIS — F32.A CHRONIC DEPRESSION: ICD-10-CM

## 2024-02-02 DIAGNOSIS — F41.9 ANXIETY: ICD-10-CM

## 2024-02-02 RX ORDER — DULOXETIN HYDROCHLORIDE 60 MG/1
CAPSULE, DELAYED RELEASE ORAL
Qty: 30 CAPSULE | Refills: 0 | OUTPATIENT
Start: 2024-02-02

## 2024-02-02 RX ORDER — DICYCLOMINE HYDROCHLORIDE 10 MG/1
10 CAPSULE ORAL 4 TIMES DAILY
Qty: 120 CAPSULE | Refills: 0 | Status: SHIPPED | OUTPATIENT
Start: 2024-02-02 | End: 2024-03-03

## 2024-02-13 DIAGNOSIS — F41.9 ANXIETY: ICD-10-CM

## 2024-02-13 DIAGNOSIS — F32.A CHRONIC DEPRESSION: ICD-10-CM

## 2024-02-13 RX ORDER — DULOXETIN HYDROCHLORIDE 60 MG/1
60 CAPSULE, DELAYED RELEASE ORAL DAILY
Qty: 30 CAPSULE | Refills: 3 | Status: SHIPPED | OUTPATIENT
Start: 2024-02-13

## 2024-02-13 RX ORDER — DULOXETIN HYDROCHLORIDE 30 MG/1
CAPSULE, DELAYED RELEASE ORAL
Qty: 30 CAPSULE | Refills: 3 | Status: SHIPPED | OUTPATIENT
Start: 2024-02-13

## 2024-03-05 ENCOUNTER — TELEPHONE (OUTPATIENT)
Dept: RHEUMATOLOGY | Age: 54
End: 2024-03-05

## 2024-03-05 NOTE — TELEPHONE ENCOUNTER
Patient is currently moving to [place]. She has an appointment with a new rheumatologist but it is not until August. She wanted to know if you could call in enough [facility] to get her through until she has her appointment or a month supply or whatever you are willing.

## 2024-04-03 DIAGNOSIS — N63.11 MASS OF UPPER OUTER QUADRANT OF RIGHT BREAST: Primary | ICD-10-CM

## 2024-04-03 NOTE — PROGRESS NOTES
Patient is scheduled on Monday, 4/8/24 for right dx mammography per protocol as a 3-month follow-up check.  Per Dr. Galindo - order placed.

## 2024-04-04 ENCOUNTER — OFFICE VISIT (OUTPATIENT)
Dept: INTERNAL MEDICINE CLINIC | Age: 54
End: 2024-04-04
Payer: COMMERCIAL

## 2024-04-04 VITALS — DIASTOLIC BLOOD PRESSURE: 76 MMHG | SYSTOLIC BLOOD PRESSURE: 132 MMHG | WEIGHT: 237 LBS | BODY MASS INDEX: 40.68 KG/M2

## 2024-04-04 DIAGNOSIS — K58.9 IRRITABLE BOWEL SYNDROME, UNSPECIFIED TYPE: ICD-10-CM

## 2024-04-04 DIAGNOSIS — R11.0 CHRONIC NAUSEA: ICD-10-CM

## 2024-04-04 DIAGNOSIS — F41.9 ANXIETY: ICD-10-CM

## 2024-04-04 DIAGNOSIS — I10 PRIMARY HYPERTENSION: ICD-10-CM

## 2024-04-04 DIAGNOSIS — F32.A CHRONIC DEPRESSION: ICD-10-CM

## 2024-04-04 DIAGNOSIS — R73.09 ELEVATED RANDOM BLOOD GLUCOSE LEVEL: ICD-10-CM

## 2024-04-04 DIAGNOSIS — N95.1 MENOPAUSAL VASOMOTOR SYNDROME: ICD-10-CM

## 2024-04-04 DIAGNOSIS — E03.4 HYPOTHYROIDISM DUE TO ACQUIRED ATROPHY OF THYROID: ICD-10-CM

## 2024-04-04 DIAGNOSIS — M05.79 RHEUMATOID ARTHRITIS INVOLVING MULTIPLE SITES WITH POSITIVE RHEUMATOID FACTOR (HCC): Primary | ICD-10-CM

## 2024-04-04 PROCEDURE — 3017F COLORECTAL CA SCREEN DOC REV: CPT

## 2024-04-04 PROCEDURE — 1036F TOBACCO NON-USER: CPT

## 2024-04-04 PROCEDURE — 99213 OFFICE O/P EST LOW 20 MIN: CPT

## 2024-04-04 PROCEDURE — 3078F DIAST BP <80 MM HG: CPT

## 2024-04-04 PROCEDURE — G8417 CALC BMI ABV UP PARAM F/U: HCPCS

## 2024-04-04 PROCEDURE — 3075F SYST BP GE 130 - 139MM HG: CPT

## 2024-04-04 PROCEDURE — G8427 DOCREV CUR MEDS BY ELIG CLIN: HCPCS

## 2024-04-04 RX ORDER — ESTRADIOL 0.5 MG/1
0.5 TABLET ORAL DAILY
Qty: 30 TABLET | Refills: 3 | Status: SHIPPED | OUTPATIENT
Start: 2024-04-04 | End: 2024-04-04

## 2024-04-04 RX ORDER — HYDROXYZINE 50 MG/1
TABLET, FILM COATED ORAL
Qty: 180 TABLET | Refills: 0 | Status: SHIPPED | OUTPATIENT
Start: 2024-04-04

## 2024-04-04 RX ORDER — PROMETHAZINE HYDROCHLORIDE 25 MG/1
25 TABLET ORAL NIGHTLY PRN
Qty: 30 TABLET | Refills: 3 | Status: SHIPPED | OUTPATIENT
Start: 2024-04-04 | End: 2024-04-04

## 2024-04-04 RX ORDER — LEVOTHYROXINE SODIUM 0.15 MG/1
150 TABLET ORAL DAILY
Qty: 90 TABLET | Refills: 0 | Status: SHIPPED | OUTPATIENT
Start: 2024-04-04 | End: 2024-07-03

## 2024-04-04 RX ORDER — DULOXETIN HYDROCHLORIDE 60 MG/1
60 CAPSULE, DELAYED RELEASE ORAL DAILY
Qty: 90 CAPSULE | Refills: 0 | Status: SHIPPED | OUTPATIENT
Start: 2024-04-04 | End: 2024-07-03

## 2024-04-04 RX ORDER — DICYCLOMINE HYDROCHLORIDE 10 MG/1
10 CAPSULE ORAL 4 TIMES DAILY
Qty: 360 CAPSULE | Refills: 0 | Status: SHIPPED | OUTPATIENT
Start: 2024-04-04 | End: 2024-07-03

## 2024-04-04 RX ORDER — HYDROXYZINE 50 MG/1
TABLET, FILM COATED ORAL
Qty: 30 TABLET | Refills: 3 | Status: SHIPPED | OUTPATIENT
Start: 2024-04-04 | End: 2024-04-04

## 2024-04-04 RX ORDER — CLONIDINE HYDROCHLORIDE 0.1 MG/1
0.1 TABLET ORAL 2 TIMES DAILY
Qty: 60 TABLET | Refills: 2 | Status: SHIPPED | OUTPATIENT
Start: 2024-04-04 | End: 2024-04-04

## 2024-04-04 RX ORDER — ADALIMUMAB 40MG/0.4ML
40 KIT SUBCUTANEOUS
Qty: 2 EACH | Refills: 2 | Status: CANCELLED | OUTPATIENT
Start: 2024-04-04

## 2024-04-04 RX ORDER — DULOXETIN HYDROCHLORIDE 30 MG/1
CAPSULE, DELAYED RELEASE ORAL
Qty: 30 CAPSULE | Refills: 3 | Status: SHIPPED | OUTPATIENT
Start: 2024-04-04 | End: 2024-04-04

## 2024-04-04 RX ORDER — PROMETHAZINE HYDROCHLORIDE 25 MG/1
25 TABLET ORAL NIGHTLY PRN
Qty: 90 TABLET | Refills: 0 | Status: SHIPPED | OUTPATIENT
Start: 2024-04-04 | End: 2024-07-03

## 2024-04-04 RX ORDER — DULOXETIN HYDROCHLORIDE 60 MG/1
60 CAPSULE, DELAYED RELEASE ORAL DAILY
Qty: 30 CAPSULE | Refills: 3 | Status: SHIPPED | OUTPATIENT
Start: 2024-04-04 | End: 2024-04-04

## 2024-04-04 RX ORDER — CLONIDINE HYDROCHLORIDE 0.1 MG/1
0.1 TABLET ORAL 2 TIMES DAILY
Qty: 180 TABLET | Refills: 0 | Status: SHIPPED | OUTPATIENT
Start: 2024-04-04 | End: 2024-07-03

## 2024-04-04 RX ORDER — SCOLOPAMINE TRANSDERMAL SYSTEM 1 MG/1
PATCH, EXTENDED RELEASE TRANSDERMAL
Qty: 10 PATCH | Refills: 1 | Status: SHIPPED | OUTPATIENT
Start: 2024-04-04

## 2024-04-04 RX ORDER — ESTRADIOL 0.5 MG/1
0.5 TABLET ORAL DAILY
Qty: 90 TABLET | Refills: 0 | Status: SHIPPED | OUTPATIENT
Start: 2024-04-04 | End: 2024-07-03

## 2024-04-04 RX ORDER — DICYCLOMINE HYDROCHLORIDE 10 MG/1
10 CAPSULE ORAL 4 TIMES DAILY
Qty: 120 CAPSULE | Refills: 0 | Status: SHIPPED | OUTPATIENT
Start: 2024-04-04 | End: 2024-04-04

## 2024-04-04 RX ORDER — DULOXETIN HYDROCHLORIDE 30 MG/1
CAPSULE, DELAYED RELEASE ORAL
Qty: 90 CAPSULE | Refills: 0 | Status: SHIPPED | OUTPATIENT
Start: 2024-04-04

## 2024-04-04 RX ORDER — LEVOTHYROXINE SODIUM 0.15 MG/1
150 TABLET ORAL DAILY
Qty: 30 TABLET | Refills: 3 | Status: SHIPPED | OUTPATIENT
Start: 2024-04-04 | End: 2024-04-04

## 2024-04-04 SDOH — ECONOMIC STABILITY: FOOD INSECURITY: WITHIN THE PAST 12 MONTHS, YOU WORRIED THAT YOUR FOOD WOULD RUN OUT BEFORE YOU GOT MONEY TO BUY MORE.: NEVER TRUE

## 2024-04-04 SDOH — ECONOMIC STABILITY: INCOME INSECURITY: HOW HARD IS IT FOR YOU TO PAY FOR THE VERY BASICS LIKE FOOD, HOUSING, MEDICAL CARE, AND HEATING?: NOT HARD AT ALL

## 2024-04-04 SDOH — ECONOMIC STABILITY: FOOD INSECURITY: WITHIN THE PAST 12 MONTHS, THE FOOD YOU BOUGHT JUST DIDN'T LAST AND YOU DIDN'T HAVE MONEY TO GET MORE.: NEVER TRUE

## 2024-04-04 NOTE — PROGRESS NOTES
General: Normal range of motion.      Cervical back: Normal range of motion.   Skin:     General: Skin is warm and dry.      Capillary Refill: Capillary refill takes less than 2 seconds.   Neurological:      General: No focal deficit present.      Mental Status: She is alert and oriented to person, place, and time. Mental status is at baseline.      GCS: GCS eye subscore is 4. GCS verbal subscore is 5. GCS motor subscore is 6.      Cranial Nerves: No cranial nerve deficit.      Sensory: Sensation is intact. No sensory deficit.      Motor: Motor function is intact.      Coordination: Coordination is intact.      Gait: Gait is intact.   Psychiatric:         Attention and Perception: Attention and perception normal.         Mood and Affect: Mood and affect normal.         Speech: Speech normal.         Behavior: Behavior normal. Behavior is cooperative.         Thought Content: Thought content normal.         Cognition and Memory: Cognition and memory normal.         Judgment: Judgment normal.          Assessment / Plan:        1. Rheumatoid arthritis involving multiple sites with positive rheumatoid factor (HCC)  Discussed Humira with PCP, will order if approved. Otherwise, f/u with rheumatologist. Continue treatment plan.    2. Chronic depression  Refill placed.     - DULoxetine (CYMBALTA) 60 MG extended release capsule; Take 1 capsule by mouth daily  Dispense: 90 capsule; Refill: 0  - DULoxetine (CYMBALTA) 30 MG extended release capsule; TAKE 1 CAPSULE BY MOUTH ONCE DAILY WITH 60MG CAPSULE  Dispense: 90 capsule; Refill: 0    3. Anxiety  Refill placed.     - DULoxetine (CYMBALTA) 60 MG extended release capsule; Take 1 capsule by mouth daily  Dispense: 90 capsule; Refill: 0  - hydrOXYzine HCl (ATARAX) 50 MG tablet; TAKE 1 TABLET BY MOUTH TWICE DAILY AS NEEDED FOR ITCHING  Dispense: 180 tablet; Refill: 0  - DULoxetine (CYMBALTA) 30 MG extended release capsule; TAKE 1 CAPSULE BY MOUTH ONCE DAILY WITH 60MG CAPSULE

## (undated) DEVICE — SUTURE VCRL SZ 4-0 L27IN ABSRB UD L17MM RB-1 1/2 CIR J214H

## (undated) DEVICE — CHLORAPREP 26ML ORANGE

## (undated) DEVICE — TUBING, SUCTION, 9/32" X 10', STRAIGHT: Brand: MEDLINE

## (undated) DEVICE — GAUZE,SPONGE,4"X4",16PLY,XRAY,STRL,LF: Brand: MEDLINE

## (undated) DEVICE — DRAPE,EXTREMITY,89X128,STERILE: Brand: MEDLINE

## (undated) DEVICE — Device

## (undated) DEVICE — CANNULA SEAL

## (undated) DEVICE — VESSEL SEALER EXTEND: Brand: ENDOWRIST

## (undated) DEVICE — AGENT HEMSTAT HUM FBRN SEAL EVICEL KT

## (undated) DEVICE — Z DISCONTINUED (USE MFG CAT MVABO)  TUBING GAS SAMPLING STD 6.5 FT FEMALE CONN SMRT CAPNOLINE

## (undated) DEVICE — BANDAGE,SELF ADHRNT,COFLEX,4"X5YD,STRL: Brand: COLABEL

## (undated) DEVICE — BANDAGE,ELASTIC,ESMARK,STERILE,4"X9',LF: Brand: MEDLINE

## (undated) DEVICE — MARKER SURG SKIN UTIL REGULAR/FINE 2 TIP W/ RUL AND 9 LBL

## (undated) DEVICE — TOWEL,OR,DSP,ST,BLUE,STD,6/PK,12PK/CS: Brand: MEDLINE

## (undated) DEVICE — FORCEPS BX L240CM JAW DIA2.8MM L CAP W/ NDL MIC MESH TOOTH

## (undated) DEVICE — GOWN,SIRUS,POLYRNF,BRTHSLV,XLN/XL,20/CS: Brand: MEDLINE

## (undated) DEVICE — RELOAD STPL L60MM H1.5-3.6MM REG TISS BLU GRIPPING SURF B

## (undated) DEVICE — DRESSING PETRO W3XL3IN OIL EMUL N ADH GZ KNIT IMPREG CELOS

## (undated) DEVICE — SUTURE COAT VCRL SZ 4-0 L18IN ABSRB UD L19MM PS-2 1/2 CIR J496G

## (undated) DEVICE — PENCIL ES CRD L10FT HND SWCHING ROCK SWCH W/ EDGE COAT BLDE

## (undated) DEVICE — SOLUTION IRRIG 1000ML STRL H2O USP PLAS POUR BTL

## (undated) DEVICE — FAN SPRAY KIT: Brand: PULSAVAC®

## (undated) DEVICE — SOLUTION IV 1000ML 0.9% SOD CHL FOR IRRIG PLAS CONT

## (undated) DEVICE — COUNTER NDL 30 COUNT FOAM STRP SGL MAG

## (undated) DEVICE — BANDAGE COMPR W4INXL5YD WHT BGE POLY COT M E WRP WV HK AND

## (undated) DEVICE — STRL PENROSE DRAIN 18" X 1/4": Brand: CARDINAL HEALTH

## (undated) DEVICE — GLOVE SURG SZ 65 L12IN FNGR THK87MIL WHT LTX FREE

## (undated) DEVICE — SOLUTION IV IRRIG WATER 1000ML POUR BRL 2F7114

## (undated) DEVICE — SOLUTION IV IRRIG POUR BRL 0.9% SODIUM CHL 2F7124

## (undated) DEVICE — DECANTER FLD 9IN ST BG FOR ASEP TRNSF OF FLD

## (undated) DEVICE — SUTURE PROL SZ 3-0 L36IN NONABSORBABLE BLU L26MM SH 1/2 CIR 8522H

## (undated) DEVICE — SYRINGE MED 10ML TRNSLUC BRL PLUNG BLK MRK POLYPR CTRL

## (undated) DEVICE — MINI STICK MAX COAXIAL MICROINTRODUCER KIT: Brand: ANGIODYNAMICS

## (undated) DEVICE — TUBING INSUFFLATOR HEAT HI FLO SET PNEUMOCLEAR

## (undated) DEVICE — ELECTRODE ES L2.75IN S STL INSUL BLDE W/ SL EDGE

## (undated) DEVICE — SYRINGE IRRIG 60ML SFT PLIABLE BLB EZ TO GRP 1 HND USE W/

## (undated) DEVICE — DRAPE TOWEL: Brand: CONVERTORS

## (undated) DEVICE — SUTURE PROL SZ 2-0 L30IN NONABSORBABLE BLU L26MM SH 1/2 CIR 8833H

## (undated) DEVICE — BANDAGE,GAUZE,BULKEE II,4.5"X4.1YD,STRL: Brand: MEDLINE

## (undated) DEVICE — PACK SURG LAP CHOLE

## (undated) DEVICE — CORD ES L15FT PT RET REUSE VALLEYLAB REM

## (undated) DEVICE — SUTURE PDS II SZ 3-0 L27IN ABSRB VLT L26MM SH 1/2 CIR Z316H

## (undated) DEVICE — GLOVE SURG SZ 8 L12IN THK75MIL DK GRN LTX FREE

## (undated) DEVICE — BANDAGE,ELASTIC,ESMARK,STERILE,6"X9',LF: Brand: MEDLINE

## (undated) DEVICE — ANTISEPTIC 16OZ H PEROX 1ST AID ORAL DEBRIDING AGNT

## (undated) DEVICE — MEGA NEEDLE DRIVER: Brand: ENDOWRIST;DAVINCI SI

## (undated) DEVICE — POSITIONER HD AD W4.5XH8XL9IN HIGHLY RESILIENT FOAM CMFRT

## (undated) DEVICE — 3M™ IOBAN™ 2 ANTIMICROBIAL INCISE DRAPE 6650EZ: Brand: IOBAN™ 2

## (undated) DEVICE — 3M™ IOBAN™ 2 ANTIMICROBIAL INCISE DRAPE 6648EZ: Brand: IOBAN™ 2

## (undated) DEVICE — SYRINGE, LUER LOCK, 10ML: Brand: MEDLINE

## (undated) DEVICE — GLOVE SURG SZ 6 THK91MIL LTX FREE SYN POLYISOPRENE ANTI

## (undated) DEVICE — INTENDED FOR TISSUE SEPARATION, AND OTHER PROCEDURES THAT REQUIRE A SHARP SURGICAL BLADE TO PUNCTURE OR CUT.: Brand: BARD-PARKER ® STAINLESS STEEL BLADES

## (undated) DEVICE — GLOVE ORANGE PI 7   MSG9070

## (undated) DEVICE — YANKAUER,FLEXIBLE HANDLE,REGLR CAPACITY: Brand: MEDLINE INDUSTRIES, INC.

## (undated) DEVICE — PADDING CAST W6INXL4YD COT LO LINTING WYTEX

## (undated) DEVICE — LINER,SEMI-RIGID,3000CC,50EA/CS: Brand: MEDLINE

## (undated) DEVICE — GLOVE SURG SZ 7 CRM LTX FREE POLYISOPRENE POLYMER BEAD ANTI

## (undated) DEVICE — ANESTHESIA CIRCUIT ADULT-LF: Brand: MEDLINE INDUSTRIES, INC.

## (undated) DEVICE — PROTECTOR EYE PT SELF ADH NS OPT GRD LF

## (undated) DEVICE — SHEET,DRAPE,53X77,STERILE: Brand: MEDLINE

## (undated) DEVICE — LINER SUCT CANSTR 1500CC SEMI RIG W/ POR HYDROPHOBIC SHUT

## (undated) DEVICE — BLADELESS OBTURATOR: Brand: WECK VISTA

## (undated) DEVICE — COLUMN DRAPE

## (undated) DEVICE — ELECTRODE ES AD CRDLSS PT RET REM POLYHESIVE

## (undated) DEVICE — STANDARD HYPODERMIC NEEDLE,POLYPROPYLENE HUB: Brand: MONOJECT

## (undated) DEVICE — CONTAINER,SPECIMEN,OR STERILE,4OZ: Brand: MEDLINE

## (undated) DEVICE — STERILE LATEX POWDER-FREE SURGICAL GLOVESWITH NITRILE COATING: Brand: PROTEXIS

## (undated) DEVICE — GLOVE SURG SZ 75 CRM LTX FREE POLYISOPRENE POLYMER BEAD ANTI

## (undated) DEVICE — MARKER,SKIN,WI/RULER AND LABELS: Brand: MEDLINE

## (undated) DEVICE — 34" SINGLE PATIENT USE HOVERMATT BREATHABLE: Brand: SINGLE PATIENT USE HOVERMATT

## (undated) DEVICE — SUTURE ETHBND EXCEL SZ 0 L36IN NONABSORBABLE GRN SH L26MM X524H

## (undated) DEVICE — TROCAR ENDOSCP L150MM DIA12MM BLDELSS OBT RADLUC STBL SL

## (undated) DEVICE — SKIN AFFIX SURG ADHESIVE 72/CS 0.55ML: Brand: MEDLINE

## (undated) DEVICE — CONTROL SYRINGE LUER-LOCK TIP: Brand: MONOJECT

## (undated) DEVICE — SOLUTION PREP 4OZ 3% H PEROX 1ST AID ANTISEP ORAL DEBRIDING

## (undated) DEVICE — GAUZE,SPONGE,2"X2",8PLY,STERILE,LF,2'S: Brand: MEDLINE

## (undated) DEVICE — SUTURE VCRL SZ 4-0 L18IN ABSRB UD L19MM PS-2 3/8 CIR PRIM J496H

## (undated) DEVICE — STAPLER 60: Brand: SUREFORM

## (undated) DEVICE — HERCULES 3 STAGE BALLOON ESOPHAGEAL: Brand: HERCULES

## (undated) DEVICE — SEALANT TISS 10 CC FIBRIN VISTASEAL

## (undated) DEVICE — SUTURE NONABSORBABLE MONOFILAMENT 4-0 P-3 18 IN ETHILON 699H

## (undated) DEVICE — SUTURE VCRL SZ 3-0 L27IN ABSRB UD L26MM SH 1/2 CIR J416H

## (undated) DEVICE — TUBING PMP L16FT MAIN DISP FOR AR-6400 AR-6475

## (undated) DEVICE — DUAL LUMEN STOMACH TUBE: Brand: SALEM SUMP

## (undated) DEVICE — SUTURE NONABSORBABLE MONOFILAMENT 4-0 FS-2 18 IN ETHILON 662H

## (undated) DEVICE — Device: Brand: MEDICAL ACTION INDUSTRIES

## (undated) DEVICE — PACK,BASIC,IX: Brand: MEDLINE

## (undated) DEVICE — SOLUTION ANTIFOG VIS SYS CLEARIFY LAPSCP

## (undated) DEVICE — TIP COVER ACCESSORY

## (undated) DEVICE — FENESTRATED BIPOLAR FORCEPS: Brand: ENDOWRIST;DAVINCI SI

## (undated) DEVICE — SET TBNG DISP TIP FOR AHTO

## (undated) DEVICE — NEEDLE HYPO 25GA L1.5IN BLU POLYPR HUB S STL REG BVL STR

## (undated) DEVICE — GLOVE ORANGE PI 7 1/2   MSG9075

## (undated) DEVICE — KNEE POSITIONING KIT: Brand: DEVON

## (undated) DEVICE — APPLICATOR MEDICATED 26 CC SOLUTION HI LT ORNG CHLORAPREP

## (undated) DEVICE — SOLUTION SURG PREP PVP IOD 10% 8 OZ BTL SCRB CARE

## (undated) DEVICE — 3M™ STERI-STRIP™ COMPOUND BENZOIN TINCTURE 40 BAGS/CARTON 4 CARTONS/CASE C1544: Brand: 3M™ STERI-STRIP™

## (undated) DEVICE — GUIDEWIRE ENDOSCP L150CM DIA0.035IN TIP 3CM PTFE NIT

## (undated) DEVICE — GLOVE SURG SZ 65 THK91MIL LTX FREE SYN POLYISOPRENE

## (undated) DEVICE — SPONGE GZ W4XL8IN COT WVN 12 PLY

## (undated) DEVICE — REDUCER: Brand: ENDOWRIST

## (undated) DEVICE — CIRCUIT BREATHING AD 75 IN 3 LT BACT VIR FILTER SWVL WYE

## (undated) DEVICE — SOLUTION IRRIG 1000ML 0.9% SOD CHL USP POUR PLAS BTL

## (undated) DEVICE — ARM DRAPE

## (undated) DEVICE — SUTURE VCRL SZ 3-0 L27IN ABSRB VLT L26MM SH 1/2 CIR J316H

## (undated) DEVICE — CONVERTORS STOCKINETTE: Brand: CONVERTORS

## (undated) DEVICE — NEEDLE SPNL 22GA L3.5IN BLK HUB S STL REG WALL FIT STYL W/

## (undated) DEVICE — SOLUTION PREP POVIDONE IOD FOR SKIN MUCOUS MEM PRIOR TO

## (undated) DEVICE — MAT TRNSF W34XL42IN HALF DBL COAT HOVERMATT

## (undated) DEVICE — SUTURE STRATAFIX SPRL PDS + VLT 3-0 15CM DISPOSABLE/SNGLE SXPP1B420

## (undated) DEVICE — GLOVE ORANGE PI 8   MSG9080

## (undated) DEVICE — ADHESIVE SKIN CLSR 0.7ML TOP DERMBND ADV

## (undated) DEVICE — PAD,ABDOMINAL,5"X9",ST,LF,25/BX: Brand: MEDLINE INDUSTRIES, INC.

## (undated) DEVICE — 3M™ STERI-DRAPE™ INCISE DRAPE 1050 (60CM X 45CM): Brand: STERI-DRAPE™

## (undated) DEVICE — GLOVE SURG SZ 7 L12IN FNGR THK87MIL WHT LTX FREE

## (undated) DEVICE — STAPLER SKIN L440MM 32MM LNG 12 FIRING B FRM PWR + GRIPPING

## (undated) DEVICE — GOWN,SIRUS,FABRNF,RAGLAN,L,ST,30/CS: Brand: MEDLINE

## (undated) DEVICE — NEEDLE SCLERO 25GA L240CM OD0.51MM ID0.24MM EXTN L4MM SHTH

## (undated) DEVICE — AGGRESSIVE PLUS, ANGLED CUTTER: Brand: FORMULA

## (undated) DEVICE — RELOAD STPL H1.8-3.8MM REG THCK TISS G 6 ROW GRIPPING SURF

## (undated) DEVICE — SOLUTION SURG PREP ANTIMICROBIAL 4 OZ SKIN WND EXIDINE

## (undated) DEVICE — TROCAR: Brand: KII FIOS FIRST ENTRY

## (undated) DEVICE — VESSEL SEALER: Brand: ENDOWRIST;DAVINCI SI

## (undated) DEVICE — DRAPE SHEET ULTRAGARD: Brand: MEDLINE

## (undated) DEVICE — STRIP,CLOSURE,WOUND,MEDI-STRIP,1/2X4: Brand: MEDLINE

## (undated) DEVICE — SUTURE VCRL SZ 2 L27IN ABSRB UD L65MM TP-1 1/2 CIR J849G

## (undated) DEVICE — 3M™ STERI-DRAPE™ U-DRAPE 1015: Brand: STERI-DRAPE™

## (undated) DEVICE — SUTURE SZ 0 27IN 5/8 CIR UR-6  TAPER PT VIOLET ABSRB VICRYL J603H

## (undated) DEVICE — SHEET,T,THYROID,STERILE: Brand: MEDLINE

## (undated) DEVICE — ZIMMER® STERILE DISPOSABLE TOURNIQUET CUFF WITH PLC, DUAL PORT, SINGLE BLADDER, 30 IN. (76 CM)

## (undated) DEVICE — SPONGE LAP W18XL18IN WHT COT 4 PLY FLD STRUNG RADPQ DISP ST

## (undated) DEVICE — Z DUP USE 2257490 ADHESIVE SKIN CLSRE 036ML TPCL 2CTL CNCRLTE HIGH VSCSTY DRMB

## (undated) DEVICE — WEREWOLF FLOW 50 COBLATION WAND: Brand: COBLATION

## (undated) DEVICE — SOLUTION IRRIG 3000ML 0.9% SOD CHL USP UROMATIC PLAS CONT

## (undated) DEVICE — 4-PORT MANIFOLD: Brand: NEPTUNE 2

## (undated) DEVICE — SUTURE PROL SZ 2-0 L36IN NONABSORBABLE BLU SH L26MM 1/2 CIR 8523H

## (undated) DEVICE — GOWN,ECLIPSE,POLYRNF,BRTHSLV,L,30/CS: Brand: MEDLINE

## (undated) DEVICE — Z DUPLICATE USE 2517136 APPLICATOR LAP 45 CM 2 FLX VISTASEAL

## (undated) DEVICE — NEEDLE,25GX1.5",REG,BEVEL: Brand: MEDLINE

## (undated) DEVICE — NEEDLE HYPO 23GA L1.5IN TURQ POLYPR HUB S STL THN WALL IM

## (undated) DEVICE — ZIMMER® STERILE DISPOSABLE TOURNIQUET CUFF WITH PLC, DUAL PORT, SINGLE BLADDER, 18 IN. (46 CM)

## (undated) DEVICE — SCISSORS SURG DIA8MM MPLR CRV ENDOWRIST

## (undated) DEVICE — PADDING CAST W6INXL4YD COT COHESIVE HND TEARABLE SPEC 100

## (undated) DEVICE — LONG TIP FORCEPS: Brand: ENDOWRIST;DAVINCI SI

## (undated) DEVICE — RESORBABLE MINI BEAD KIT
Type: IMPLANTABLE DEVICE | Site: FOOT | Status: NON-FUNCTIONAL
Brand: OSTEOSET

## (undated) DEVICE — GLOVE SURG SZ 65 CRM LTX FREE POLYISOPRENE POLYMER BEAD ANTI

## (undated) DEVICE — EXCEL 10FT (3.05 M) INSUFFLATION TUBING SET WITH 0.1 MICRON FILTER: Brand: EXCEL

## (undated) DEVICE — SUTURE S STL SZ 4-0 L18IN NONABSORBABLE SIL L19MM FS-2 3/8 603G

## (undated) DEVICE — TUBING, SUCTION, 1/4" X 10', STRAIGHT: Brand: MEDLINE

## (undated) DEVICE — BLADE SURG NO15 C STL SHRP PREM

## (undated) DEVICE — TROCAR ENDOSCP L100MM DIA12MM BLDELSS OBT RADLUC STBL SL

## (undated) DEVICE — SYRINGE MED 30ML STD CLR PLAS LUERLOCK TIP N CTRL DISP

## (undated) DEVICE — SUTURE VCRL SZ 4-0 L27IN ABSRB VLT L26MM SH 1/2 CIR J315H

## (undated) DEVICE — SHEET, T, LAPAROTOMY, STERILE: Brand: MEDLINE

## (undated) DEVICE — SWAB CULT SGL AMIES W/O CHAR FOR THRT VAG SKIN HRT CULTSWAB

## (undated) DEVICE — SUREFORM 45: Brand: SUREFORM

## (undated) DEVICE — TROCAR ENDOSCP L150MM DIA5MM BLDELSS STBL SL CAM PRT W/

## (undated) DEVICE — DRESSING TRNSPAR W5XL4.5IN FLM SHT SEMIPERMEABLE WIND

## (undated) DEVICE — STAPLER 60 RELOAD BLUE: Brand: SUREFORM

## (undated) DEVICE — COVER,C-ARM,41X74: Brand: MEDLINE

## (undated) DEVICE — SEAL

## (undated) DEVICE — COUNTER NDL 60 COUNT FOAM STRP SGL MAG

## (undated) DEVICE — SUTURE ABSORBABLE BRAIDED 2-0 CT-1 27 IN UD VICRYL J259H

## (undated) DEVICE — TOTAL TRAY, DB, 100% SILI FOLEY, 16FR 10: Brand: MEDLINE

## (undated) DEVICE — TUBING FLTR PLUME AWAY EVAC W/ SUCT DEV DISP PUREVIEW

## (undated) DEVICE — SUTURE ETHLN SZ 3-0 L18IN NONABSORBABLE BLK FS-1 L24MM 3/8 663H

## (undated) DEVICE — RELOAD STPL H41XL60MM GRN THCK B FORM NAT ARTC ECHELON

## (undated) DEVICE — X-RAY DETECTABLE SPONGES,16 PLY: Brand: VISTEC

## (undated) DEVICE — ARTHROSCOPY PACK: Brand: MEDLINE INDUSTRIES, INC.

## (undated) DEVICE — TUBING, SUCTION, 3/16" X 10', STRAIGHT: Brand: MEDLINE

## (undated) DEVICE — SNAP KOVER: Brand: UNBRANDED

## (undated) DEVICE — SYRINGE INFL 60ML DISP ALLIANCE II

## (undated) DEVICE — TAPE MED W1/8XL30IN WHT POLY

## (undated) DEVICE — DRESSING TRNSPAR W6XL8IN FLM SURESITE 123